# Patient Record
Sex: FEMALE | Race: WHITE | NOT HISPANIC OR LATINO | Employment: OTHER | URBAN - METROPOLITAN AREA
[De-identification: names, ages, dates, MRNs, and addresses within clinical notes are randomized per-mention and may not be internally consistent; named-entity substitution may affect disease eponyms.]

---

## 2017-01-09 RX ORDER — ZOLPIDEM TARTRATE 10 MG/1
TABLET ORAL
Qty: 30 TAB | Refills: 0 | Status: SHIPPED
Start: 2017-01-09 | End: 2017-12-26

## 2017-02-27 ENCOUNTER — HOSPITAL ENCOUNTER (OUTPATIENT)
Facility: MEDICAL CENTER | Age: 62
End: 2017-02-27
Attending: FAMILY MEDICINE
Payer: MEDICARE

## 2017-02-27 ENCOUNTER — OFFICE VISIT (OUTPATIENT)
Dept: INTERNAL MEDICINE | Facility: IMAGING CENTER | Age: 62
End: 2017-02-27
Payer: MEDICARE

## 2017-02-27 DIAGNOSIS — E53.8 B12 DEFICIENCY: ICD-10-CM

## 2017-02-27 DIAGNOSIS — E55.9 VITAMIN D DEFICIENCY: ICD-10-CM

## 2017-02-27 DIAGNOSIS — R60.0 PEDAL EDEMA: ICD-10-CM

## 2017-02-27 DIAGNOSIS — L57.0 AK (ACTINIC KERATOSIS): ICD-10-CM

## 2017-02-27 DIAGNOSIS — K50.819 CROHN'S DISEASE OF SMALL AND LARGE INTESTINES WITH COMPLICATION (HCC): ICD-10-CM

## 2017-02-27 DIAGNOSIS — L82.1 SK (SEBORRHEIC KERATOSIS): ICD-10-CM

## 2017-02-27 DIAGNOSIS — Z91.09 ENVIRONMENTAL ALLERGIES: ICD-10-CM

## 2017-02-27 LAB
25(OH)D3 SERPL-MCNC: 37 NG/ML (ref 30–100)
ALBUMIN SERPL BCP-MCNC: 4.4 G/DL (ref 3.2–4.9)
ALBUMIN/GLOB SERPL: 1.2 G/DL
ALP SERPL-CCNC: 103 U/L (ref 30–99)
ALT SERPL-CCNC: 20 U/L (ref 2–50)
ANION GAP SERPL CALC-SCNC: 8 MMOL/L (ref 0–11.9)
AST SERPL-CCNC: 21 U/L (ref 12–45)
BASOPHILS # BLD AUTO: 0.06 K/UL (ref 0–0.12)
BASOPHILS NFR BLD AUTO: 0.8 % (ref 0–1.8)
BILIRUB SERPL-MCNC: 0.4 MG/DL (ref 0.1–1.5)
BUN SERPL-MCNC: 14 MG/DL (ref 8–22)
CALCIUM SERPL-MCNC: 10.6 MG/DL (ref 8.5–10.5)
CHLORIDE SERPL-SCNC: 102 MMOL/L (ref 96–112)
CO2 SERPL-SCNC: 27 MMOL/L (ref 20–33)
CREAT SERPL-MCNC: 0.92 MG/DL (ref 0.5–1.4)
EOSINOPHIL # BLD: 0.25 K/UL (ref 0–0.51)
EOSINOPHIL NFR BLD AUTO: 3.5 % (ref 0–6.9)
ERYTHROCYTE [DISTWIDTH] IN BLOOD BY AUTOMATED COUNT: 41 FL (ref 35.9–50)
GLOBULIN SER CALC-MCNC: 3.6 G/DL (ref 1.9–3.5)
GLUCOSE SERPL-MCNC: 76 MG/DL (ref 65–99)
HCT VFR BLD AUTO: 42.1 % (ref 37–47)
HGB BLD-MCNC: 13.5 G/DL (ref 12–16)
IMM GRANULOCYTES # BLD AUTO: 0.03 K/UL (ref 0–0.11)
IMM GRANULOCYTES NFR BLD AUTO: 0.4 % (ref 0–0.9)
LYMPHOCYTES # BLD: 2.08 K/UL (ref 1–4.8)
LYMPHOCYTES NFR BLD AUTO: 29.3 % (ref 22–41)
MCH RBC QN AUTO: 29.5 PG (ref 27–33)
MCHC RBC AUTO-ENTMCNC: 32.1 G/DL (ref 33.6–35)
MCV RBC AUTO: 92.1 FL (ref 81.4–97.8)
MONOCYTES # BLD: 0.55 K/UL (ref 0–0.85)
MONOCYTES NFR BLD AUTO: 7.7 % (ref 0–13.4)
NEUTROPHILS # BLD: 4.13 K/UL (ref 2–7.15)
NEUTROPHILS NFR BLD AUTO: 58.3 % (ref 44–72)
NRBC # BLD AUTO: 0 K/UL
NRBC BLD-RTO: 0 /100 WBC
PLATELET # BLD AUTO: 212 K/UL (ref 164–446)
PMV BLD AUTO: 10.8 FL (ref 9–12.9)
POTASSIUM SERPL-SCNC: 3.4 MMOL/L (ref 3.6–5.5)
PROT SERPL-MCNC: 8 G/DL (ref 6–8.2)
RBC # BLD AUTO: 4.57 M/UL (ref 4.2–5.4)
SODIUM SERPL-SCNC: 137 MMOL/L (ref 135–145)
WBC # BLD AUTO: 7.1 K/UL (ref 4.8–10.8)

## 2017-02-27 PROCEDURE — 82306 VITAMIN D 25 HYDROXY: CPT

## 2017-02-27 PROCEDURE — 3014F SCREEN MAMMO DOC REV: CPT | Mod: 8P | Performed by: FAMILY MEDICINE

## 2017-02-27 PROCEDURE — G8482 FLU IMMUNIZE ORDER/ADMIN: HCPCS | Performed by: FAMILY MEDICINE

## 2017-02-27 PROCEDURE — 3017F COLORECTAL CA SCREEN DOC REV: CPT | Performed by: FAMILY MEDICINE

## 2017-02-27 PROCEDURE — G8420 CALC BMI NORM PARAMETERS: HCPCS | Performed by: FAMILY MEDICINE

## 2017-02-27 PROCEDURE — G8432 DEP SCR NOT DOC, RNG: HCPCS | Performed by: FAMILY MEDICINE

## 2017-02-27 PROCEDURE — 80053 COMPREHEN METABOLIC PANEL: CPT

## 2017-02-27 PROCEDURE — 99214 OFFICE O/P EST MOD 30 MIN: CPT | Mod: 25 | Performed by: FAMILY MEDICINE

## 2017-02-27 PROCEDURE — 85025 COMPLETE CBC W/AUTO DIFF WBC: CPT

## 2017-02-27 PROCEDURE — 96372 THER/PROPH/DIAG INJ SC/IM: CPT | Mod: 59 | Performed by: FAMILY MEDICINE

## 2017-02-27 PROCEDURE — 17000 DESTRUCT PREMALG LESION: CPT | Performed by: FAMILY MEDICINE

## 2017-02-27 PROCEDURE — 1036F TOBACCO NON-USER: CPT | Performed by: FAMILY MEDICINE

## 2017-02-27 RX ORDER — IPRATROPIUM BROMIDE 21 UG/1
2 SPRAY, METERED NASAL 2 TIMES DAILY
Qty: 1 BOTTLE | Refills: 2 | Status: SHIPPED | OUTPATIENT
Start: 2017-02-27 | End: 2017-12-29

## 2017-02-27 RX ORDER — POTASSIUM CHLORIDE 750 MG/1
20 CAPSULE, EXTENDED RELEASE ORAL
Qty: 60 CAP | Refills: 2 | Status: SHIPPED | OUTPATIENT
Start: 2017-02-27 | End: 2017-11-02 | Stop reason: SDUPTHER

## 2017-02-27 RX ORDER — CYANOCOBALAMIN 1000 UG/ML
1000 INJECTION, SOLUTION INTRAMUSCULAR; SUBCUTANEOUS
OUTPATIENT
Start: 2017-02-27 | End: 2018-02-22

## 2017-02-27 RX ORDER — FLUTICASONE PROPIONATE 50 MCG
1-2 SPRAY, SUSPENSION (ML) NASAL
Qty: 1 BOTTLE | Refills: 3 | Status: SHIPPED | OUTPATIENT
Start: 2017-02-27 | End: 2018-04-19

## 2017-02-27 RX ADMIN — CYANOCOBALAMIN 1000 MCG: 1000 INJECTION, SOLUTION INTRAMUSCULAR; SUBCUTANEOUS at 15:32

## 2017-02-27 NOTE — Clinical Note
February 27, 2017        RE:  Jana Overton      To Whom It May Concern:      Jana Overton was unable to travel on February 7 through February 21, 2017 due to aggravation of chronic medical conditions.    Please offer her every courtesy in rescheduling.     Sincerely,          Denice Bazzi MD

## 2017-02-27 NOTE — MR AVS SNAPSHOT
Jana JONE Brooklynn   2017 2:30 PM   Office Visit   MRN: [id number]    Department:  [facility]   Dept Phone:  [phone]    Description:  Female : 1955   Provider:  Amanda Bazzi M.D.           Reason for Visit     Follow-Up           Allergies as of 2017     Allergen Noted Reactions    Morphine 2007   Swelling    Methotrexate 2008   Hives, Rash    Roxanol [Morphine Sulfate] 2010       Throat swells    Tape 2012   Rash    Skin peels off; paper tape    Ativan [Lorazepam] 2015       States give me nightmares.     Azathioprine Sodium 10/20/2007   Hives    Celestone [Betamethasone Sodium Phosphate] 2012       Demerol 2007   Vomiting    Elavil [Amitriptyline] 2014       Nightmares      Fentanyl 2008       Patches caused skin breakdown, no pain relief    Kdc:Fentanyl 2008       Lyrica [Pregabalin] 2008       Methadone 2008       Methotrexate Derivatives 2008       Pseudoephedrine 2007   Vomiting    Remicade [Infliximab Injection] 10/20/2007   Hives    Sulfa Drugs 2010   Hives    Sulfasalazine 2013       Tizanidine Hydrochloride 2008         You were diagnosed with     Environmental allergies   [659831]       Vitamin D deficiency   [4129024]       Crohn's disease of small and large intestines with complication (CMS-[facility])   [4587718]       B12 deficiency   [609272]         Vital Signs     Smoking Status                   Never Smoker            Basic Information     Date Of Birth Sex Race Ethnicity Preferred Language    1955 Female White Non- English      Problem List              ICD-10-CM Priority Class Noted - Resolved    Crohn's disease (CMS-[facility]) K50.90   2009 - Present    Chronic pain G89.29   2009 - Present    Status post lumbar surgery Z98.890   2012 - Present    GERD (gastroesophageal reflux disease) K21.9   2012 - Present    History of cardiac  murmur (Chronic) Z86.79   Unknown - Present    Ileostomy in place (CMS-HCC) Z93.2   6/26/2013 - Present    Postherpetic neuralgia B02.29   6/24/2014 - Present    Multiple falls R29.6   6/24/2014 - Present    COPD (chronic obstructive pulmonary disease) (CMS-HCC) J44.9   6/24/2014 - Present    Rosacea L71.9   6/24/2014 - Present    Hypokalemia E87.6   6/24/2014 - Present    Sleep apnea G47.30   10/26/2014 - Present    Hypercalcemia E83.52   3/26/2015 - Present    Paroxysmal atrial fibrillation (CMS-HCC) I48.0   3/26/2015 - Present    History of DVT (deep vein thrombosis) Z86.718   11/23/2016 - Present      Health Maintenance        Date Due Completion Dates    MAMMOGRAM 9/24/2016 9/24/2015, 5/21/2013, 11/1/2010, 1/24/2008, 1/24/2008    PAP SMEAR 11/23/2019 11/23/2016, 10/24/2014    IMM DTaP/Tdap/Td Vaccine (2 - Td) 12/4/2022 12/4/2012    COLONOSCOPY 5/24/2024 5/24/2014 (Prv Comp), 10/4/2011    Override on 5/24/2014: Previously completed            Current Immunizations     Influenza TIV (IM) 9/27/2015, 11/16/2013, 10/6/2012, 9/17/2007    Influenza Vaccine Pediatric 9/29/2009    Influenza Vaccine Quad Inj (Pf) 10/24/2014    Influenza Vaccine Quad Inj (Preserved) 9/21/2016    Pneumococcal Vaccine (UF)Historical Data 10/17/2007    Pneumococcal polysaccharide vaccine (PPSV-23) 10/6/2012    SHINGLES VACCINE 11/1/2014    Tdap Vaccine 12/4/2012      Below and/or attached are the medications your provider expects you to take. Review all of your home medications and newly ordered medications with your provider and/or pharmacist. Follow medication instructions as directed by your provider and/or pharmacist. Please keep your medication list with you and share with your provider. Update the information when medications are discontinued, doses are changed, or new medications (including over-the-counter products) are added; and carry medication information at all times in the event of emergency situations     Allergies:  MORPHINE  - Swelling     METHOTREXATE - Hives,Rash     ROXANOL - (reactions not documented)     TAPE - Rash     ATIVAN - (reactions not documented)     AZATHIOPRINE SODIUM - Hives     CELESTONE - (reactions not documented)     DEMEROL - Vomiting     ELAVIL - (reactions not documented)     FENTANYL - (reactions not documented)     KDC:FENTANYL - (reactions not documented)     LYRICA - (reactions not documented)     METHADONE - (reactions not documented)     METHOTREXATE DERIVATIVES - (reactions not documented)     PSEUDOEPHEDRINE - Vomiting     REMICADE - Hives     SULFA DRUGS - Hives     SULFASALAZINE - (reactions not documented)     TIZANIDINE HYDROCHLORIDE - (reactions not documented)               Medications  Valid as of: February 27, 2017 -  4:04 PM    Generic Name Brand Name Tablet Size Instructions for use    Albuterol Sulfate (Aero Soln) albuterol 108 (90 BASE) MCG/ACT Inhale 2 Puffs by mouth every 6 hours as needed for Shortness of Breath.        Cetirizine HCl (Tab) ZYRTEC 10 MG Take 10 mg by mouth 1 time daily as needed. For allergies        Cyanocobalamin (Solution) VITAMIN B-12 1000 MCG/ML 1 mL by Intramuscular route every 30 days.        DiazePAM (Tab) VALIUM 5 MG Take 1 Tab by mouth every 6 hours as needed (muscle spasm).        Ergocalciferol (Cap) DRISDOL 29533 UNITS TAKE 1 CAPSULE BY MOUTH EVERY 7 DAYS        Estradiol (Cream) ESTRACE 0.1 MG/GM Insert 0.5g vaginally three times each week.        Fluticasone Propionate (Suspension) FLONASE 50 MCG/ACT Spray 1-2 Sprays in nose 1 time daily as needed. Indications: Hayfever        Furosemide (Tab) LASIX 20 MG Take 20 mg by mouth 1 time daily as needed. Indications: Edema        Granisetron HCl (Tab) KYTRIL 1 MG Take 1 Tab by mouth every 12 hours as needed for Nausea/Vomiting.        Ipratropium Bromide (Solution) ATROVENT 0.03 % Spray 2 Sprays in nose 2 times a day.        Lidocaine (Patch) LIDODERM 5 %         Metoprolol Tartrate (Tab) LOPRESSOR 25 MG Take 25 mg  "by mouth 3 times a day as needed. For palpitations        Nystatin (Suspension) MYCOSTATIN 324973 UNIT/ML Take 5 mL by mouth 4 times a day.        Ondansetron (TABLET DISPERSIBLE) ZOFRAN ODT 4 MG Take 2 Tabs by mouth every 8 hours as needed.        OxyCODONE HCl (Conc) oxycodone 20 MG/ML Take 30 mg by mouth every four hours as needed. 20mg/ml solution        Potassium Chloride (Cap CR) MICRO-K 10 MEQ Take 2 Caps by mouth 1 time daily as needed. WITH LASIX ONLY        Probiotic Product (Cap) PROBIOTIC DAILY  Take 1 Cap by mouth every day.        Scopolamine Base (PATCH 72 HR) TRANSDERM-SCOP 1.5 MG/3DAYS Apply 1 Patch to skin as directed every 72 hours.        Spironolactone (Tab) ALDACTONE 25 MG Take 1 Tab by mouth 2 times a day.        Sulfacetamide Sodium-Sulfur (Emulsion) Sulfacetamide Sodium-Sulfur 10-1 % Use as directed once daily.        Syringe/Needle (Disp) (Misc) BD INTEGRA SYRINGE 25G X 1\" 3 ML USE 1 SYRINGE AS DIRECTED EVERY 30 DAYS        Zolpidem Tartrate (Tab) AMBIEN 10 MG TAKE 1 TABLET BY MOUTH AT BEDTIME AS NEEDED FOR SLEEP        .                 Medicines prescribed today were sent to:     [facility] [address] [place] [id number] [place] [address] & [place] [id number] [facility] [address]    Phone: [phone] Fax: [phone]    Open 24 Hours?: No      Medication refill instructions:       If your prescription bottle indicates you have medication refills left, it is not necessary to call your provider’s office. Please contact your pharmacy and they will refill your medication.    If your prescription bottle indicates you do not have any refills left, you may request refills at any time through one of the following ways: The online [employer] system (except Urgent Care), by calling your provider’s office, or by asking your pharmacy to contact your provider’s office with a refill request. Medication refills are processed only during regular business hours and " may not be available until the next business day. Your provider may request additional information or to have a follow-up visit with you prior to refilling your medication.   *Please Note: Medication refills are assigned a new Rx number when refilled electronically. Your pharmacy may indicate that no refills were authorized even though a new prescription for the same medication is available at the pharmacy. Please request the medicine by name with the pharmacy before contacting your provider for a refill.        Your To Do List     Future Labs/Procedures Complete By Expires    CBC WITH DIFFERENTIAL  As directed 2/28/2018    COMP METABOLIC PANEL  As directed 2/28/2018    VITAMIN D,25 HYDROXY  As directed 2/28/2018      Other Notes About Your Plan     Pain Mangement:  Dr. Telles  Vascular:  Dr. Cevallos.  GI: Dr. Mahan  Card: [facility]  Pulm:  [facility]             MyChart Status: Patient Declined

## 2017-02-28 NOTE — PROGRESS NOTES
Chief Complaint   Patient presents with   • Follow-Up     crohns, skin spots, labwork       HISTORY OF PRESENT ILLNESS: Patient is a 61 y.o. female established patient who presents today to follow-up on several issues.    She has Crohn's disease. She is status post several abdominal surgeries. She has a colostomy. She does need a note for the airlines. She and her  plan to travel from February 7 to the 21st that she was experiencing a flare of her Crohn's disease she was having increased abdominal pain, diarrhea. Nausea and vomiting. She needs a note to reschedule.    Also experienced intermittent pedal edema. That has been better. She's been trying to be a little bit more active. She does use Lasix occasionally, always takes potassium with it. Is here also to have blood work drawn. I recommend she recheck her CBC and chem panel.    She has a couple of lesions she would like checked. She has a red scaly lesion on the left side of her nose but never heals completely. Been there for at least 6 months. She has several lesions on her back that are elevated rough and itchy.      Patient Active Problem List    Diagnosis Date Noted   • History of DVT (deep vein thrombosis) 11/23/2016   • Hypercalcemia 03/26/2015   • Paroxysmal atrial fibrillation (CMS-HCC) 03/26/2015   • Sleep apnea 10/26/2014   • Postherpetic neuralgia 06/24/2014   • Multiple falls 06/24/2014   • COPD (chronic obstructive pulmonary disease) (CMS-HCC) 06/24/2014   • Rosacea 06/24/2014   • Hypokalemia 06/24/2014   • Ileostomy in place (CMS-HCC) 06/26/2013   • History of cardiac murmur    • GERD (gastroesophageal reflux disease) 12/05/2012   • Status post lumbar surgery 07/16/2012   • Crohn's disease (CMS-HCC) 09/23/2009   • Chronic pain 09/23/2009     Current Outpatient Prescriptions on File Prior to Visit   Medication Sig Dispense Refill   • zolpidem (AMBIEN) 10 MG Tab TAKE 1 TABLET BY MOUTH AT BEDTIME AS NEEDED FOR SLEEP 30 Tab 0   • Sulfacetamide  "Sodium-Sulfur 10-1 % Emulsion Use as directed once daily. 1 Tube 1   • scopolamine (TRANSDERM-SCOP) 1.5 MG/3DAYS PATCH 72 HR Apply 1 Patch to skin as directed every 72 hours. 4 Patch 1   • spironolactone (ALDACTONE) 25 MG Tab Take 1 Tab by mouth 2 times a day. 60 Tab 3   • estradiol (ESTRACE) 0.1 MG/GM vaginal cream Insert 0.5g vaginally three times each week. 1 Tube 6   • albuterol 108 (90 BASE) MCG/ACT Aero Soln inhalation aerosol Inhale 2 Puffs by mouth every 6 hours as needed for Shortness of Breath. 1 Inhaler 1   • granisetron (KYTRIL) 1 MG Tab Take 1 Tab by mouth every 12 hours as needed for Nausea/Vomiting. 30 Tab 1   • ondansetron (ZOFRAN ODT) 4 MG TABLET DISPERSIBLE Take 2 Tabs by mouth every 8 hours as needed. 30 Tab 3   • nystatin (MYCOSTATIN) 817300 UNIT/ML Suspension Take 5 mL by mouth 4 times a day. 120 mL 1   • vitamin D, Ergocalciferol, (DRISDOL) 27082 UNITS Cap capsule TAKE 1 CAPSULE BY MOUTH EVERY 7 DAYS 4 Cap 11   • BD INTEGRA SYRINGE 25G X 1\" 3 ML Misc USE 1 SYRINGE AS DIRECTED EVERY 30 DAYS 11 Each 0   • lidocaine (LIDODERM) 5 % PTCH   1   • furosemide (LASIX) 20 MG TABS Take 20 mg by mouth 1 time daily as needed. Indications: Edema     • metoprolol (LOPRESSOR) 25 MG TABS Take 25 mg by mouth 3 times a day as needed. For palpitations     • Probiotic Product (PROBIOTIC DAILY) CAPS Take 1 Cap by mouth every day.     • cyanocobalamin (VITAMIN B-12) 1000 MCG/ML SOLN 1 mL by Intramuscular route every 30 days. 1 mL 0   • diazepam (VALIUM) 5 MG TABS Take 1 Tab by mouth every 6 hours as needed (muscle spasm). 120 Tab 2   • cetirizine (ZYRTEC) 10 MG TABS Take 10 mg by mouth 1 time daily as needed. For allergies     • oxycodone 20 MG/ML CONC Take 30 mg by mouth every four hours as needed. 20mg/ml solution       No current facility-administered medications on file prior to visit.         Past medical, surgical, family, and social history is reviewed and updated in Epic chart by me today.   Medications and " allergies reviewed and updated in Epic chart by me today.     REVIEW OF SYSTEMS:  GENERAL: No fatigue, no weight loss.  HEENT:  Ears--no earache, no change in hearing, no dizziness, no tinnitus.                 Eyes--no blurred vision, no discharge or pain                 Throat--No sore throat, no dysphagia, no hoarseness  CV:  No chest pain,dyspnea,palpitations or edema.  RESP:  No sob,cough,wheezing or hemoptysis.  GI: No dysphagia, heartburn,abdominal pain, nausea, vomiting, diarrhea or constipation.       No melena, jaundice, bleeding, incontinence or change in bowel habits.  :  No dysuria, polyuria, hematuria, incontinence, or nocturia.  MS:  No joint swelling, myalgias, or arthralgias.  NEURO:  No seizures, syncope, paralysis, tremor, or weakness.  SKIN: No new or concerning skin lesions or changes.   PSYCH: Mood fine.    Vitals:  Temperature 98.6  Respirations 14  Oxygen 96%  Blood pressure 132/80   pulse 100      Physical Exam:  Gen: Well developed, well nourished. No acute distress.  Neck:  Supple, no adenopathy or thyromegaly.  Heart:  Regular rate and rhythm.  Normal S1, S2. No murmur, gallop or rub.  Lungs:  Clear, No wheezes,rales or rhonchi.  Extremities:  No edema.  Skin: She has a 4 mm erythematous scaling dry lesion on the left side of her nose consistent with an actinic keratosis  She has 3 lesions on her back, ranging from 2 mm to 4 mm in size, all are light tan, rough, elevated and consistent with seborrheic keratoses.  Psych: Mood and affect are appropriate.    Assessment/Plan:  1. Environmental allergies. She does she is Flonase and Atrovent on a daily basis. It does keep her allergy symptoms control.  ipratropium (ATROVENT) 0.03 % Solution   2. Vitamin D deficiency. She'll continue on vitamin D replacement monitor lab work.  VITAMIN D,25 HYDROXY   3. Crohn's disease of small and large intestines with complication (CMS-HCC) . Her symptoms have improved. She is given a note for the airlines  to allow her to reschedule her trip. She'll also follow-up every 6 months with GI.  COMP METABOLIC PANEL    CBC WITH DIFFERENTIAL   4. B12 deficiency . B-12 injection today.  cyanocobalamin (VITAMIN B-12) injection 1,000 mcg   5. Pedal edema . Encouraged her to continue with her activity. Elevate her legs when resting. She may use Lasix as needed. She will always take potassium with Lasix.     6. AK (actinic keratosis). The lesion on her nose is treated today with liquid nitrogen. She is instructed in wound care.     7. SK (seborrheic keratosis) . The 3 lesions on her back are also treated today with liquid nitrogen. She was instructed in wound care and will call for concerns.        Follow-up in 3 months and as needed

## 2017-03-01 DIAGNOSIS — J40 BRONCHITIS: ICD-10-CM

## 2017-03-01 RX ORDER — PROMETHAZINE HYDROCHLORIDE AND CODEINE PHOSPHATE 6.25; 1 MG/5ML; MG/5ML
5 SYRUP ORAL 4 TIMES DAILY PRN
Qty: 120 ML | Refills: 0 | Status: SHIPPED
Start: 2017-03-01 | End: 2017-12-26

## 2017-03-02 ENCOUNTER — TELEPHONE (OUTPATIENT)
Dept: INTERNAL MEDICINE | Facility: IMAGING CENTER | Age: 62
End: 2017-03-02

## 2017-03-02 RX ORDER — CEFDINIR 300 MG/1
300 CAPSULE ORAL 2 TIMES DAILY
Qty: 20 CAP | Refills: 0 | Status: SHIPPED | OUTPATIENT
Start: 2017-03-02 | End: 2017-11-02

## 2017-03-02 RX ORDER — CODEINE PHOSPHATE/GUAIFENESIN 10-100MG/5
5 LIQUID (ML) ORAL 3 TIMES DAILY PRN
Qty: 120 ML | Refills: 0 | Status: SHIPPED
Start: 2017-03-02 | End: 2017-12-26

## 2017-03-02 NOTE — TELEPHONE ENCOUNTER
----- Message from Dianne Sandoval R.N. sent at 3/2/2017 10:29 AM PST -----  Jana would like to speak with you today.  She rambled into to one topic and another so I would clarify this.  She needs a letter for the airline so that they put her in a seat behind the bulkhead so that she gets circulation on her stoma since her legs are so long.  She also needs a letter so that she can have a wheelchair in the airport and stating that she also carries her medical supplies,medications,etc.  She also wants to chat with you about her ears.  [place] LEMONS

## 2017-03-02 NOTE — TELEPHONE ENCOUNTER
Spoke with Jana. Recommend she try a Kenalog injection to help with allergies and eustachian tube dysfunction prior to her vacation.    I also will arrange a letter to address the mentioned issues

## 2017-03-02 NOTE — Clinical Note
March 2, 2017        RE:  Jana Overton    To Whom It May Concern,    Jana Overton suffers from several chronic medical conditions and has a colostomy. Please offer her the courtesy of sitting in a seat behind the bulkhead to allow more room and comfort.     She will be traveling with ostomy supplies and pain medications.    She will need a wheelchair to transport inside the airport.    Thank you for all accommodations that you can offer.    Sincerely,        Amanda Bazzi MD

## 2017-03-03 ENCOUNTER — NON-PROVIDER VISIT (OUTPATIENT)
Dept: INTERNAL MEDICINE | Facility: IMAGING CENTER | Age: 62
End: 2017-03-03
Payer: MEDICARE

## 2017-03-03 DIAGNOSIS — G89.4 CHRONIC PAIN SYNDROME: ICD-10-CM

## 2017-03-03 DIAGNOSIS — B02.29 POSTHERPETIC NEURALGIA: ICD-10-CM

## 2017-03-03 PROCEDURE — 96372 THER/PROPH/DIAG INJ SC/IM: CPT | Performed by: FAMILY MEDICINE

## 2017-03-03 RX ORDER — TRIAMCINOLONE ACETONIDE 40 MG/ML
60 INJECTION, SUSPENSION INTRA-ARTICULAR; INTRAMUSCULAR ONCE
Status: COMPLETED | OUTPATIENT
Start: 2017-03-03 | End: 2017-03-03

## 2017-03-03 RX ADMIN — TRIAMCINOLONE ACETONIDE 60 MG: 40 INJECTION, SUSPENSION INTRA-ARTICULAR; INTRAMUSCULAR at 16:45

## 2017-04-04 ENCOUNTER — OFFICE VISIT (OUTPATIENT)
Dept: INTERNAL MEDICINE | Facility: IMAGING CENTER | Age: 62
End: 2017-04-04
Payer: MEDICARE

## 2017-04-04 VITALS
HEIGHT: 72 IN | OXYGEN SATURATION: 97 % | WEIGHT: 160 LBS | RESPIRATION RATE: 14 BRPM | HEART RATE: 87 BPM | SYSTOLIC BLOOD PRESSURE: 138 MMHG | BODY MASS INDEX: 21.67 KG/M2 | DIASTOLIC BLOOD PRESSURE: 74 MMHG | TEMPERATURE: 97.6 F

## 2017-04-04 DIAGNOSIS — E55.9 VITAMIN D DEFICIENCY: ICD-10-CM

## 2017-04-04 DIAGNOSIS — L57.0 AK (ACTINIC KERATOSIS): ICD-10-CM

## 2017-04-04 DIAGNOSIS — S80.811A ABRASION, LOWER LEG, ANTERIOR, RIGHT, INITIAL ENCOUNTER: ICD-10-CM

## 2017-04-04 DIAGNOSIS — L91.8 SKIN TAG: ICD-10-CM

## 2017-04-04 DIAGNOSIS — S60.519A ABRASION HAND: ICD-10-CM

## 2017-04-04 PROCEDURE — 1036F TOBACCO NON-USER: CPT | Performed by: FAMILY MEDICINE

## 2017-04-04 PROCEDURE — 11200 RMVL SKIN TAGS UP TO&INC 15: CPT | Mod: 59 | Performed by: FAMILY MEDICINE

## 2017-04-04 PROCEDURE — G8420 CALC BMI NORM PARAMETERS: HCPCS | Performed by: FAMILY MEDICINE

## 2017-04-04 PROCEDURE — 17000 DESTRUCT PREMALG LESION: CPT | Performed by: FAMILY MEDICINE

## 2017-04-04 PROCEDURE — 17003 DESTRUCT PREMALG LES 2-14: CPT | Performed by: FAMILY MEDICINE

## 2017-04-04 PROCEDURE — 3014F SCREEN MAMMO DOC REV: CPT | Mod: 8P | Performed by: FAMILY MEDICINE

## 2017-04-04 PROCEDURE — G8432 DEP SCR NOT DOC, RNG: HCPCS | Performed by: FAMILY MEDICINE

## 2017-04-04 PROCEDURE — 99213 OFFICE O/P EST LOW 20 MIN: CPT | Mod: 25 | Performed by: FAMILY MEDICINE

## 2017-04-04 RX ORDER — TORSEMIDE 10 MG/1
10 TABLET ORAL DAILY
Qty: 30 TAB | Refills: 3 | Status: SHIPPED | OUTPATIENT
Start: 2017-04-04 | End: 2017-12-26

## 2017-04-04 RX ORDER — ERGOCALCIFEROL 1.25 MG/1
50000 CAPSULE ORAL
Qty: 4 CAP | Refills: 11 | Status: SHIPPED | OUTPATIENT
Start: 2017-04-04 | End: 2017-12-26

## 2017-04-04 RX ADMIN — CYANOCOBALAMIN 1000 MCG: 1000 INJECTION, SOLUTION INTRAMUSCULAR; SUBCUTANEOUS at 14:27

## 2017-04-04 NOTE — MR AVS SNAPSHOT
"Jana Overton   2017 1:30 PM   Office Visit   MRN: [id number]    Department:  [facility]   Dept Phone:  [phone]    Description:  Female : 1955   Provider:  Amanda Bazzi M.D.           Allergies as of 2017     Allergen Noted Reactions    Morphine 2007   Swelling    Methotrexate 2008   Hives, Rash    Roxanol [Morphine Sulfate] 2010       Throat swells    Tape 2012   Rash    Skin peels off; paper tape    Ativan [Lorazepam] 2015       States give me nightmares.     Azathioprine Sodium 10/20/2007   Hives    Celestone [Betamethasone Sodium Phosphate] 2012       Demerol 2007   Vomiting    Elavil [Amitriptyline] 2014       Nightmares      Fentanyl 2008       Patches caused skin breakdown, no pain relief    Kdc:Fentanyl 2008       Lyrica [Pregabalin] 2008       Methadone 2008       Methotrexate Derivatives 2008       Pseudoephedrine 2007   Vomiting    Remicade [Infliximab Injection] 10/20/2007   Hives    Sulfa Drugs 2010   Hives    Sulfasalazine 2013       Tizanidine Hydrochloride 2008         Vital Signs     Blood Pressure Pulse Temperature Respirations Height Weight    138/74 mmHg 87 36.4 °C (97.6 °F) 14 1.83 m (6' 0.05\") 72.576 kg (160 lb)    Body Mass Index Oxygen Saturation Smoking Status             21.67 kg/m2 97% Never Smoker          Basic Information     Date Of Birth Sex Race Ethnicity Preferred Language    1955 Female White Non- English      Problem List              ICD-10-CM Priority Class Noted - Resolved    Crohn's disease (CMS-[facility]) K50.90   2009 - Present    Chronic pain G89.29   2009 - Present    Status post lumbar surgery Z98.890   2012 - Present    GERD (gastroesophageal reflux disease) K21.9   2012 - Present    History of cardiac murmur (Chronic) Z86.79   Unknown - Present    Ileostomy in place (CMS-[facility]) Z93.2   2013 - " Present    Postherpetic neuralgia B02.29   6/24/2014 - Present    Multiple falls R29.6   6/24/2014 - Present    COPD (chronic obstructive pulmonary disease) (CMS-HCC) J44.9   6/24/2014 - Present    Rosacea L71.9   6/24/2014 - Present    Hypokalemia E87.6   6/24/2014 - Present    Sleep apnea G47.30   10/26/2014 - Present    Hypercalcemia E83.52   3/26/2015 - Present    Paroxysmal atrial fibrillation (CMS-HCC) I48.0   3/26/2015 - Present    History of DVT (deep vein thrombosis) Z86.718   11/23/2016 - Present      Health Maintenance        Date Due Completion Dates    MAMMOGRAM 9/24/2016 9/24/2015, 5/21/2013, 11/1/2010, 1/24/2008, 1/24/2008    PAP SMEAR 11/23/2019 11/23/2016, 10/24/2014    IMM DTaP/Tdap/Td Vaccine (2 - Td) 12/4/2022 12/4/2012    COLONOSCOPY 5/24/2024 5/24/2014 (Prv Comp), 10/4/2011    Override on 5/24/2014: Previously completed            Current Immunizations     Influenza TIV (IM) 9/27/2015, 11/16/2013, 10/6/2012, 9/17/2007    Influenza Vaccine Pediatric 9/29/2009    Influenza Vaccine Quad Inj (Pf) 10/24/2014    Influenza Vaccine Quad Inj (Preserved) 9/21/2016    Pneumococcal Vaccine (UF)Historical Data 10/17/2007    Pneumococcal polysaccharide vaccine (PPSV-23) 10/6/2012    SHINGLES VACCINE 11/1/2014    Tdap Vaccine 12/4/2012      Below and/or attached are the medications your provider expects you to take. Review all of your home medications and newly ordered medications with your provider and/or pharmacist. Follow medication instructions as directed by your provider and/or pharmacist. Please keep your medication list with you and share with your provider. Update the information when medications are discontinued, doses are changed, or new medications (including over-the-counter products) are added; and carry medication information at all times in the event of emergency situations     Allergies:  MORPHINE - Swelling     METHOTREXATE - Hives,Rash     ROXANOL - (reactions not documented)     TAPE -  Rash     ATIVAN - (reactions not documented)     AZATHIOPRINE SODIUM - Hives     CELESTONE - (reactions not documented)     DEMEROL - Vomiting     ELAVIL - (reactions not documented)     FENTANYL - (reactions not documented)     KDC:FENTANYL - (reactions not documented)     LYRICA - (reactions not documented)     METHADONE - (reactions not documented)     METHOTREXATE DERIVATIVES - (reactions not documented)     PSEUDOEPHEDRINE - Vomiting     REMICADE - Hives     SULFA DRUGS - Hives     SULFASALAZINE - (reactions not documented)     TIZANIDINE HYDROCHLORIDE - (reactions not documented)               Medications  Valid as of: April 04, 2017 -  4:43 PM    Generic Name Brand Name Tablet Size Instructions for use    Albuterol Sulfate (Aero Soln) albuterol 108 (90 BASE) MCG/ACT Inhale 2 Puffs by mouth every 6 hours as needed for Shortness of Breath.        Cefdinir (Cap) OMNICEF 300 MG Take 1 Cap by mouth 2 times a day.        Cetirizine HCl (Tab) ZYRTEC 10 MG Take 10 mg by mouth 1 time daily as needed. For allergies        Cyanocobalamin (Solution) VITAMIN B-12 1000 MCG/ML 1 mL by Intramuscular route every 30 days.        DiazePAM (Tab) VALIUM 5 MG Take 1 Tab by mouth every 6 hours as needed (muscle spasm).        Ergocalciferol (Cap) DRISDOL 02431 UNITS Take 1 Cap by mouth every 7 days.        Estradiol (Cream) ESTRACE 0.1 MG/GM Insert 0.5g vaginally three times each week.        Fluticasone Propionate (Suspension) FLONASE 50 MCG/ACT Spray 1-2 Sprays in nose 1 time daily as needed. Indications: Hayfever        Furosemide (Tab) LASIX 20 MG Take 20 mg by mouth 1 time daily as needed. Indications: Edema        Granisetron HCl (Tab) KYTRIL 1 MG Take 1 Tab by mouth every 12 hours as needed for Nausea/Vomiting.        Guaifenesin-Codeine (Syrup) TUSSI-ORGANIDIN -10 MG/5ML Take 5 mL by mouth 3 times a day as needed for Cough.        Ipratropium Bromide (Solution) ATROVENT 0.03 % Spray 2 Sprays in nose 2 times a day.      "   Lidocaine (Patch) LIDODERM 5 %         Metoprolol Tartrate (Tab) LOPRESSOR 25 MG Take 1 Tab by mouth 3 times a day as needed. For palpitations        Nystatin (Suspension) MYCOSTATIN 881219 UNIT/ML Take 5 mL by mouth 4 times a day.        Ondansetron (TABLET DISPERSIBLE) ZOFRAN ODT 4 MG Take 2 Tabs by mouth every 8 hours as needed.        OxyCODONE HCl (Conc) oxycodone 20 MG/ML Take 30 mg by mouth every four hours as needed. 20mg/ml solution        Potassium Chloride (Cap CR) MICRO-K 10 MEQ Take 2 Caps by mouth 1 time daily as needed. WITH LASIX ONLY        Probiotic Product (Cap) PROBIOTIC DAILY  Take 1 Cap by mouth every day.        Promethazine-Codeine (Syrup) PHENERGAN-CODEINE 6.25-10 MG/5ML Take 5 mL by mouth 4 times a day as needed for Cough.        Scopolamine Base (PATCH 72 HR) TRANSDERM-SCOP 1.5 MG/3DAYS Apply 1 Patch to skin as directed every 72 hours.        Spironolactone (Tab) ALDACTONE 25 MG Take 1 Tab by mouth 2 times a day.        Sulfacetamide Sodium-Sulfur (Emulsion) Sulfacetamide Sodium-Sulfur 10-1 % Use as directed once daily.        Syringe/Needle (Disp) (Misc) BD INTEGRA SYRINGE 25G X 1\" 3 ML USE 1 SYRINGE AS DIRECTED EVERY 30 DAYS        Torsemide (Tab) DEMADEX 10 MG Take 1 Tab by mouth every day. Prn swelling        Zolpidem Tartrate (Tab) AMBIEN 10 MG TAKE 1 TABLET BY MOUTH AT BEDTIME AS NEEDED FOR SLEEP        .                 Medicines prescribed today were sent to:     [facility] [address] [place] [address] [facility]    [address] [facility] [place] [address]    Phone: [phone] Fax: [phone]    Open 24 Hours?: No      Medication refill instructions:       If your prescription bottle indicates you have medication refills left, it is not necessary to call your provider’s office. Please contact your pharmacy and they will refill your medication.    If your prescription bottle indicates you do not have any refills left, you may " request refills at any time through one of the following ways: The online [employer] system (except Urgent Care), by calling your provider’s office, or by asking your pharmacy to contact your provider’s office with a refill request. Medication refills are processed only during regular business hours and may not be available until the next business day. Your provider may request additional information or to have a follow-up visit with you prior to refilling your medication.   *Please Note: Medication refills are assigned a new Rx number when refilled electronically. Your pharmacy may indicate that no refills were authorized even though a new prescription for the same medication is available at the pharmacy. Please request the medicine by name with the pharmacy before contacting your provider for a refill.        Other Notes About Your Plan     Pain Mangement:  Dr. Telles  Vascular:  Dr. Cevallos.  GI: Dr. Mahan  Card: [facility]  Pulm:  [facility]             [employer] Status: Patient Declined

## 2017-04-05 NOTE — PROGRESS NOTES
Chief Complaint   Patient presents with   • Skin Lesion     several       HISTORY OF PRESENT ILLNESS: Patient is a 61 y.o. female established patient who presents today to check a couple skin lesions. She scraped the top of her right hand on a zipper as she was reaching into a bank. She also scraped her right lower leg on something. Both of these happened about 2 weeks ago. They are slowly healing. She would just like them checked. She's not been running any fever. Neither of them have been draining. She has been keeping them covered.    She also has a couple of skin lesions. She is a small skin tag on her right upper eyelid that is irritating. She also has several scaling lesions on both hands that do not heal completely. History of sun exposure.      Patient Active Problem List    Diagnosis Date Noted   • History of DVT (deep vein thrombosis) 11/23/2016   • Hypercalcemia 03/26/2015   • Paroxysmal atrial fibrillation (CMS-HCC) 03/26/2015   • Sleep apnea 10/26/2014   • Postherpetic neuralgia 06/24/2014   • Multiple falls 06/24/2014   • COPD (chronic obstructive pulmonary disease) (CMS-HCC) 06/24/2014   • Rosacea 06/24/2014   • Hypokalemia 06/24/2014   • Ileostomy in place (CMS-HCC) 06/26/2013   • History of cardiac murmur    • GERD (gastroesophageal reflux disease) 12/05/2012   • Status post lumbar surgery 07/16/2012   • Crohn's disease (CMS-HCC) 09/23/2009   • Chronic pain 09/23/2009     Current Outpatient Prescriptions on File Prior to Visit   Medication Sig Dispense Refill   • guaifenesin-codeine (TUSSI-ORGANIDIN NR) 100-10 MG/5ML syrup Take 5 mL by mouth 3 times a day as needed for Cough. 120 mL 0   • cefdinir (OMNICEF) 300 MG Cap Take 1 Cap by mouth 2 times a day. 20 Cap 0   • promethazine-codeine (PHENERGAN-CODEINE) 6.25-10 MG/5ML Syrup Take 5 mL by mouth 4 times a day as needed for Cough. 120 mL 0   • potassium chloride (MICRO-K) 10 MEQ capsule Take 2 Caps by mouth 1 time daily as needed. WITH LASIX ONLY 60  "Cap 2   • fluticasone (FLONASE) 50 MCG/ACT nasal spray Spray 1-2 Sprays in nose 1 time daily as needed. Indications: Hayfever 1 Bottle 3   • ipratropium (ATROVENT) 0.03 % Solution Spray 2 Sprays in nose 2 times a day. 1 Bottle 2   • zolpidem (AMBIEN) 10 MG Tab TAKE 1 TABLET BY MOUTH AT BEDTIME AS NEEDED FOR SLEEP 30 Tab 0   • Sulfacetamide Sodium-Sulfur 10-1 % Emulsion Use as directed once daily. 1 Tube 1   • scopolamine (TRANSDERM-SCOP) 1.5 MG/3DAYS PATCH 72 HR Apply 1 Patch to skin as directed every 72 hours. 4 Patch 1   • spironolactone (ALDACTONE) 25 MG Tab Take 1 Tab by mouth 2 times a day. 60 Tab 3   • estradiol (ESTRACE) 0.1 MG/GM vaginal cream Insert 0.5g vaginally three times each week. 1 Tube 6   • albuterol 108 (90 BASE) MCG/ACT Aero Soln inhalation aerosol Inhale 2 Puffs by mouth every 6 hours as needed for Shortness of Breath. 1 Inhaler 1   • granisetron (KYTRIL) 1 MG Tab Take 1 Tab by mouth every 12 hours as needed for Nausea/Vomiting. 30 Tab 1   • ondansetron (ZOFRAN ODT) 4 MG TABLET DISPERSIBLE Take 2 Tabs by mouth every 8 hours as needed. 30 Tab 3   • nystatin (MYCOSTATIN) 006995 UNIT/ML Suspension Take 5 mL by mouth 4 times a day. 120 mL 1   • BD INTEGRA SYRINGE 25G X 1\" 3 ML Misc USE 1 SYRINGE AS DIRECTED EVERY 30 DAYS 11 Each 0   • lidocaine (LIDODERM) 5 % PTCH   1   • furosemide (LASIX) 20 MG TABS Take 20 mg by mouth 1 time daily as needed. Indications: Edema     • Probiotic Product (PROBIOTIC DAILY) CAPS Take 1 Cap by mouth every day.     • cyanocobalamin (VITAMIN B-12) 1000 MCG/ML SOLN 1 mL by Intramuscular route every 30 days. 1 mL 0   • diazepam (VALIUM) 5 MG TABS Take 1 Tab by mouth every 6 hours as needed (muscle spasm). 120 Tab 2   • cetirizine (ZYRTEC) 10 MG TABS Take 10 mg by mouth 1 time daily as needed. For allergies     • oxycodone 20 MG/ML CONC Take 30 mg by mouth every four hours as needed. 20mg/ml solution       Current Facility-Administered Medications on File Prior to Visit " "  Medication Dose Route Frequency Provider Last Rate Last Dose   • cyanocobalamin (VITAMIN B-12) injection 1,000 mcg  1,000 mcg Intramuscular Q30 DAYS Amanda Bazzi M.D.   1,000 mcg at 04/04/17 1427       Past medical, surgical, family, and social history is reviewed and updated in Epic chart by me today.   Medications and allergies reviewed and updated in Epic chart by me today.     REVIEW OF SYSTEMS:  GENERAL: Chronic fatigue, no weight loss.  HEENT:  Ears--no earache, no change in hearing, no dizziness, no tinnitus.                 Eyes--no blurred vision, no discharge or pain                 Throat--No sore throat, no dysphagia, no hoarseness  CV:  No chest pain,dyspnea,palpitations or edema.  RESP:  No sob,cough,wheezing or hemoptysis.  GI: She has a colostomy. No change in stools. No nausea, vomiting. Chronic abdominal pain.  :  No dysuria, polyuria, hematuria, incontinence, or nocturia.  MS:  No joint swelling, myalgias, or arthralgias.  NEURO:  No seizures, syncope, paralysis, tremor, or weakness.  SKIN: As above.  PSYCH: Mood fine.    Filed Vitals:    04/04/17 1300   BP: 138/74   Pulse: 87   Temp: 36.4 °C (97.6 °F)   Resp: 14   Height: 1.83 m (6' 0.05\")   Weight: 72.576 kg (160 lb)   SpO2: 97%     Physical Exam:  Gen: Well developed, well nourished. No acute distress.  Neck:  Supple, no adenopathy or thyromegaly.  Heart:  Regular rate and rhythm.  Normal S1, S2. No murmur, gallop or rub.  Lungs:  Clear, No wheezes,rales or rhonchi.  Extremities:  No edema.  Psych: Mood and affect are appropriate.  Skin: She has a 1 cm erythematous healing lesion on  the dorsum of her right hand. There is no drainage. No sign of infection. She has a 1 cm lesion on her right lower leg also in the process of healing. No drainage. Mildly erythematous. No warmth    She has a 2 mm skin tag on her right upper eyelid at the eyelash line.  She has 2 lesions on the dorsum of each hand. All are between 2 and 5 mm in size, " erythematous, scaling, consistent with actinic keratoses.    Assessment/Plan:  1. Abrasion hand . Healing. No signs of infection. Reassurance and observation.     2. Abrasion, lower leg, anterior, right, initial encounter . Healing. No signs of infection. Reassurance and observation.     3. Skin tag . Treated with liquid nitrogen today.     4. AK (actinic keratosis) 4 lesions were treated today with liquid nitrogen. She is instructed in wound care and will call for any concerns.

## 2017-04-10 DIAGNOSIS — R60.9 EDEMA, UNSPECIFIED TYPE: ICD-10-CM

## 2017-04-10 RX ORDER — SPIRONOLACTONE 25 MG/1
25 TABLET ORAL 2 TIMES DAILY
Qty: 60 TAB | Refills: 3 | Status: SHIPPED | OUTPATIENT
Start: 2017-04-10 | End: 2017-12-09 | Stop reason: SDUPTHER

## 2017-04-12 ENCOUNTER — TELEPHONE (OUTPATIENT)
Dept: INTERNAL MEDICINE | Facility: IMAGING CENTER | Age: 62
End: 2017-04-12

## 2017-04-12 DIAGNOSIS — M51.36 DDD (DEGENERATIVE DISC DISEASE), LUMBAR: ICD-10-CM

## 2017-04-12 DIAGNOSIS — M54.2 NECK PAIN: ICD-10-CM

## 2017-04-12 PROBLEM — M51.369 DDD (DEGENERATIVE DISC DISEASE), LUMBAR: Status: ACTIVE | Noted: 2017-04-12

## 2017-04-12 NOTE — TELEPHONE ENCOUNTER
----- Message from Dianne Sandoval R.N. sent at 4/12/2017  2:12 PM PDT -----  Jana would like a referral to [facility] on [facility] and [facility] to see Pérez LEMONS

## 2017-04-25 PROBLEM — D23.71 OTHER BENIGN NEOPLASM OF SKIN OF RIGHT LOWER LIMB, INCLUDING HIP: Status: ACTIVE | Noted: 2017-04-25

## 2017-04-26 DIAGNOSIS — F41.9 ANXIETY: ICD-10-CM

## 2017-04-26 RX ORDER — DIAZEPAM 5 MG/1
2.5-5 TABLET ORAL EVERY 6 HOURS PRN
Qty: 120 TAB | Refills: 0 | Status: SHIPPED
Start: 2017-04-26 | End: 2017-10-11 | Stop reason: SDUPTHER

## 2017-04-28 DIAGNOSIS — J98.01 BRONCHOSPASM: ICD-10-CM

## 2017-04-28 RX ORDER — ALBUTEROL SULFATE 90 UG/1
2 AEROSOL, METERED RESPIRATORY (INHALATION) EVERY 6 HOURS PRN
Qty: 1 INHALER | Refills: 1 | Status: SHIPPED | OUTPATIENT
Start: 2017-04-28 | End: 2017-05-03 | Stop reason: SDUPTHER

## 2017-05-03 ENCOUNTER — OFFICE VISIT (OUTPATIENT)
Dept: INTERNAL MEDICINE | Facility: IMAGING CENTER | Age: 62
End: 2017-05-03
Payer: MEDICARE

## 2017-05-03 VITALS
SYSTOLIC BLOOD PRESSURE: 126 MMHG | DIASTOLIC BLOOD PRESSURE: 70 MMHG | OXYGEN SATURATION: 93 % | RESPIRATION RATE: 12 BRPM | HEIGHT: 72 IN | TEMPERATURE: 97.7 F | HEART RATE: 83 BPM | WEIGHT: 160 LBS | BODY MASS INDEX: 21.67 KG/M2

## 2017-05-03 DIAGNOSIS — H69.93 ETD (EUSTACHIAN TUBE DYSFUNCTION), BILATERAL: ICD-10-CM

## 2017-05-03 DIAGNOSIS — R09.82 PND (POST-NASAL DRIP): ICD-10-CM

## 2017-05-03 DIAGNOSIS — J98.01 BRONCHOSPASM: ICD-10-CM

## 2017-05-03 PROCEDURE — 3014F SCREEN MAMMO DOC REV: CPT | Mod: 8P | Performed by: FAMILY MEDICINE

## 2017-05-03 PROCEDURE — 1036F TOBACCO NON-USER: CPT | Performed by: FAMILY MEDICINE

## 2017-05-03 PROCEDURE — G8420 CALC BMI NORM PARAMETERS: HCPCS | Performed by: FAMILY MEDICINE

## 2017-05-03 PROCEDURE — G8432 DEP SCR NOT DOC, RNG: HCPCS | Performed by: FAMILY MEDICINE

## 2017-05-03 PROCEDURE — 99213 OFFICE O/P EST LOW 20 MIN: CPT | Performed by: FAMILY MEDICINE

## 2017-05-03 RX ORDER — ACYCLOVIR 200 MG/5ML
SUSPENSION ORAL
Refills: 0 | COMMUNITY
Start: 2017-04-26 | End: 2017-12-29

## 2017-05-03 RX ORDER — ALBUTEROL SULFATE 90 UG/1
2 AEROSOL, METERED RESPIRATORY (INHALATION) EVERY 6 HOURS PRN
Qty: 1 INHALER | Refills: 1 | OUTPATIENT
Start: 2017-05-03 | End: 2018-07-27 | Stop reason: SDUPTHER

## 2017-05-03 RX ADMIN — CYANOCOBALAMIN 1000 MCG: 1000 INJECTION, SOLUTION INTRAMUSCULAR; SUBCUTANEOUS at 14:52

## 2017-05-03 NOTE — MR AVS SNAPSHOT
"Jana Overton   5/3/2017 2:00 PM   Office Visit   MRN: [id number]    Department:  [facility]   Dept Phone:  [phone]    Description:  Female : 1955   Provider:  Amanda Bazzi M.D.           Reason for Visit     Sinus Problem thick mucus       Allergies as of 5/3/2017     Allergen Noted Reactions    Morphine 2007   Swelling    Methotrexate 2008   Hives, Rash    Roxanol [Morphine Sulfate] 2010       Throat swells    Tape 2012   Rash    Skin peels off; paper tape    Ativan [Lorazepam] 2015       States give me nightmares.     Azathioprine Sodium 10/20/2007   Hives    Celestone [Betamethasone Sodium Phosphate] 2012       Demerol 2007   Vomiting    Elavil [Amitriptyline] 2014       Nightmares      Fentanyl 2008       Patches caused skin breakdown, no pain relief    Kdc:Fentanyl 2008       Lyrica [Pregabalin] 2008       Methadone 2008       Methotrexate Derivatives 2008       Pseudoephedrine 2007   Vomiting    Remicade [Infliximab Injection] 10/20/2007   Hives    Sulfa Drugs 2010   Hives    Sulfasalazine 2013       Tizanidine Hydrochloride 2008         Vital Signs     Blood Pressure Pulse Temperature Respirations Height Weight    126/70 mmHg 83 36.5 °C (97.7 °F) 12 1.842 m (6' 0.5\") 72.576 kg (160 lb)    Body Mass Index Oxygen Saturation Smoking Status             21.39 kg/m2 93% Never Smoker          Basic Information     Date Of Birth Sex Race Ethnicity Preferred Language    1955 Female White Non- English      Problem List              ICD-10-CM Priority Class Noted - Resolved    Crohn's disease (CMS-HCC) K50.90   2009 - Present    Chronic pain G89.29   2009 - Present    Status post lumbar surgery Z98.890   2012 - Present    GERD (gastroesophageal reflux disease) K21.9   2012 - Present    History of cardiac murmur (Chronic) Z86.79   Unknown - Present   " Ileostomy in place (CMS-HCC) Z93.2   6/26/2013 - Present    Postherpetic neuralgia B02.29   6/24/2014 - Present    Multiple falls R29.6   6/24/2014 - Present    COPD (chronic obstructive pulmonary disease) (CMS-HCC) J44.9   6/24/2014 - Present    Rosacea L71.9   6/24/2014 - Present    Hypokalemia E87.6   6/24/2014 - Present    Sleep apnea G47.30   10/26/2014 - Present    Hypercalcemia E83.52   3/26/2015 - Present    Paroxysmal atrial fibrillation (CMS-HCC) I48.0   3/26/2015 - Present    History of DVT (deep vein thrombosis) Z86.718   11/23/2016 - Present    DDD (degenerative disc disease), lumbar M51.36   4/12/2017 - Present      Health Maintenance        Date Due Completion Dates    MAMMOGRAM 9/24/2016 9/24/2015, 5/21/2013, 11/1/2010, 1/24/2008, 1/24/2008    PAP SMEAR 11/23/2019 11/23/2016, 10/24/2014    IMM DTaP/Tdap/Td Vaccine (2 - Td) 12/4/2022 12/4/2012    COLONOSCOPY 5/24/2024 5/24/2014 (Prv Comp), 10/4/2011    Override on 5/24/2014: Previously completed            Current Immunizations     Influenza TIV (IM) 9/27/2015, 11/16/2013, 10/6/2012, 9/17/2007    Influenza Vaccine Pediatric 9/29/2009    Influenza Vaccine Quad Inj (Pf) 10/24/2014    Influenza Vaccine Quad Inj (Preserved) 9/21/2016    Pneumococcal Vaccine (UF)Historical Data 10/17/2007    Pneumococcal polysaccharide vaccine (PPSV-23) 10/6/2012    SHINGLES VACCINE 11/1/2014    Tdap Vaccine 12/4/2012      Below and/or attached are the medications your provider expects you to take. Review all of your home medications and newly ordered medications with your provider and/or pharmacist. Follow medication instructions as directed by your provider and/or pharmacist. Please keep your medication list with you and share with your provider. Update the information when medications are discontinued, doses are changed, or new medications (including over-the-counter products) are added; and carry medication information at all times in the event of emergency situations      Allergies:  MORPHINE - Swelling     METHOTREXATE - Hives,Rash     ROXANOL - (reactions not documented)     TAPE - Rash     ATIVAN - (reactions not documented)     AZATHIOPRINE SODIUM - Hives     CELESTONE - (reactions not documented)     DEMEROL - Vomiting     ELAVIL - (reactions not documented)     FENTANYL - (reactions not documented)     KDC:FENTANYL - (reactions not documented)     LYRICA - (reactions not documented)     METHADONE - (reactions not documented)     METHOTREXATE DERIVATIVES - (reactions not documented)     PSEUDOEPHEDRINE - Vomiting     REMICADE - Hives     SULFA DRUGS - Hives     SULFASALAZINE - (reactions not documented)     TIZANIDINE HYDROCHLORIDE - (reactions not documented)               Medications  Valid as of: May 03, 2017 -  3:50 PM    Generic Name Brand Name Tablet Size Instructions for use    Acyclovir (Suspension) ZOVIRAX 200 MG/5ML SHAKE LQ AND TK 10 ML PO BID FOR 7 DAYS        Albuterol Sulfate (Aero Soln) albuterol 108 (90 BASE) MCG/ACT Inhale 2 Puffs by mouth every 6 hours as needed for Shortness of Breath.        Cefdinir (Cap) OMNICEF 300 MG Take 1 Cap by mouth 2 times a day.        Cetirizine HCl (Tab) ZYRTEC 10 MG Take 10 mg by mouth 1 time daily as needed. For allergies        Cyanocobalamin (Solution) VITAMIN B-12 1000 MCG/ML 1 mL by Intramuscular route every 30 days.        DiazePAM (Tab) VALIUM 5 MG Take 0.5-1 Tabs by mouth every 6 hours as needed (muscle spasm).        Ergocalciferol (Cap) DRISDOL 18130 UNITS Take 1 Cap by mouth every 7 days.        Estradiol (Cream) ESTRACE 0.1 MG/GM Insert 0.5g vaginally three times each week.        Fluticasone Propionate (Suspension) FLONASE 50 MCG/ACT Spray 1-2 Sprays in nose 1 time daily as needed. Indications: Hayfever        Furosemide (Tab) LASIX 20 MG Take 20 mg by mouth 1 time daily as needed. Indications: Edema        Granisetron HCl (Tab) KYTRIL 1 MG Take 1 Tab by mouth every 12 hours as needed for Nausea/Vomiting.         "Guaifenesin-Codeine (Syrup) TUSSI-ORGANIDIN -10 MG/5ML Take 5 mL by mouth 3 times a day as needed for Cough.        Ipratropium Bromide (Solution) ATROVENT 0.03 % Spray 2 Sprays in nose 2 times a day.        Lidocaine (Patch) LIDODERM 5 %         Metoprolol Tartrate (Tab) LOPRESSOR 25 MG Take 1 Tab by mouth 3 times a day as needed. For palpitations        Nystatin (Suspension) MYCOSTATIN 162003 UNIT/ML Take 5 mL by mouth 4 times a day.        Ondansetron (TABLET DISPERSIBLE) ZOFRAN ODT 4 MG Take 2 Tabs by mouth every 8 hours as needed.        OxyCODONE HCl (Conc) oxycodone 20 MG/ML Take 30 mg by mouth every four hours as needed. 20mg/ml solution        Potassium Chloride (Cap CR) MICRO-K 10 MEQ Take 2 Caps by mouth 1 time daily as needed. WITH LASIX ONLY        Probiotic Product (Cap) PROBIOTIC DAILY  Take 1 Cap by mouth every day.        Promethazine-Codeine (Syrup) PHENERGAN-CODEINE 6.25-10 MG/5ML Take 5 mL by mouth 4 times a day as needed for Cough.        Scopolamine Base (PATCH 72 HR) TRANSDERM-SCOP 1.5 MG/3DAYS Apply 1 Patch to skin as directed every 72 hours.        Spironolactone (Tab) ALDACTONE 25 MG Take 1 Tab by mouth 2 times a day.        Sulfacetamide Sodium-Sulfur (Emulsion) Sulfacetamide Sodium-Sulfur 10-1 % Use as directed once daily.        Syringe/Needle (Disp) (Misc) BD INTEGRA SYRINGE 25G X 1\" 3 ML USE 1 SYRINGE AS DIRECTED EVERY 30 DAYS        Torsemide (Tab) DEMADEX 10 MG Take 1 Tab by mouth every day. Prn swelling        Zolpidem Tartrate (Tab) AMBIEN 10 MG TAKE 1 TABLET BY MOUTH AT BEDTIME AS NEEDED FOR SLEEP        .                 Medicines prescribed today were sent to:     [facility] [address] [place] [address], [place] - [address] [facility]    [address] [facility] [address]    Phone: [phone] Fax: [phone]    Open 24 Hours?: No      Medication refill instructions:       If your prescription bottle indicates you have medication refills " left, it is not necessary to call your provider’s office. Please contact your pharmacy and they will refill your medication.    If your prescription bottle indicates you do not have any refills left, you may request refills at any time through one of the following ways: The online [facility] system (except Urgent Care), by calling your provider’s office, or by asking your pharmacy to contact your provider’s office with a refill request. Medication refills are processed only during regular business hours and may not be available until the next business day. Your provider may request additional information or to have a follow-up visit with you prior to refilling your medication.   *Please Note: Medication refills are assigned a new Rx number when refilled electronically. Your pharmacy may indicate that no refills were authorized even though a new prescription for the same medication is available at the pharmacy. Please request the medicine by name with the pharmacy before contacting your provider for a refill.        Other Notes About Your Plan     Pain Mangement:  Dr. Telles  Vascular:  Dr. Cevallos.  GI: Dr. Mahan  Card: [facility]  Pulm:  [facility]             MyChart Status: Patient Declined

## 2017-05-04 NOTE — PROGRESS NOTES
Chief Complaint   Patient presents with   • Sinus Problem     thick mucus and ear popping       HISTORY OF PRESENT ILLNESS: Patient is a 61 y.o. female established patient who presents today to follow-up on chronic postnasal drainage. She feels like she gets quite a bit of sinus drainage that then collects in her throat. She is often clearing her throat and spitting up some thick mucus. Her ears also feel plugged frequently. [place] and [place] she comes down into town she feels popping and sometimes discomfort. She is using Flonase daily. She is using Mucinex but she is using an extended release Mucinex twice daily. She is drinking plenty of fluids. She has seen ENT in the past. He offered her PE tubes in her ears but wasn't sure they would help that much. She really does not want to go that route.      Patient Active Problem List    Diagnosis Date Noted   • DDD (degenerative disc disease), lumbar 04/12/2017   • History of DVT (deep vein thrombosis) 11/23/2016   • Hypercalcemia 03/26/2015   • Paroxysmal atrial fibrillation (CMS-HCC) 03/26/2015   • Sleep apnea 10/26/2014   • Postherpetic neuralgia 06/24/2014   • Multiple falls 06/24/2014   • COPD (chronic obstructive pulmonary disease) (CMS-HCC) 06/24/2014   • Rosacea 06/24/2014   • Hypokalemia 06/24/2014   • Ileostomy in place (CMS-HCC) 06/26/2013   • History of cardiac murmur    • GERD (gastroesophageal reflux disease) 12/05/2012   • Status post lumbar surgery 07/16/2012   • Crohn's disease (CMS-HCC) 09/23/2009   • Chronic pain 09/23/2009     Current Outpatient Prescriptions on File Prior to Visit   Medication Sig Dispense Refill   • diazepam (VALIUM) 5 MG Tab Take 0.5-1 Tabs by mouth every 6 hours as needed (muscle spasm). 120 Tab 0   • spironolactone (ALDACTONE) 25 MG Tab Take 1 Tab by mouth 2 times a day. 60 Tab 3   • vitamin D, Ergocalciferol, (DRISDOL) 30458 UNITS Cap capsule Take 1 Cap by mouth every 7 days. 4 Cap 11   • metoprolol  "(LOPRESSOR) 25 MG Tab Take 1 Tab by mouth 3 times a day as needed. For palpitations 60 Tab 1   • torsemide (DEMADEX) 10 MG tablet Take 1 Tab by mouth every day. Prn swelling 30 Tab 3   • guaifenesin-codeine (TUSSI-ORGANIDIN NR) 100-10 MG/5ML syrup Take 5 mL by mouth 3 times a day as needed for Cough. 120 mL 0                 • potassium chloride (MICRO-K) 10 MEQ capsule Take 2 Caps by mouth 1 time daily as needed. WITH LASIX ONLY 60 Cap 2   • fluticasone (FLONASE) 50 MCG/ACT nasal spray Spray 1-2 Sprays in nose 1 time daily as needed. Indications: Hayfever 1 Bottle 3   • ipratropium (ATROVENT) 0.03 % Solution Spray 2 Sprays in nose 2 times a day. 1 Bottle 2   • zolpidem (AMBIEN) 10 MG Tab TAKE 1 TABLET BY MOUTH AT BEDTIME AS NEEDED FOR SLEEP 30 Tab 0   • Sulfacetamide Sodium-Sulfur 10-1 % Emulsion Use as directed once daily. 1 Tube 1   • scopolamine (TRANSDERM-SCOP) 1.5 MG/3DAYS PATCH 72 HR Apply 1 Patch to skin as directed every 72 hours. 4 Patch 1   • estradiol (ESTRACE) 0.1 MG/GM vaginal cream Insert 0.5g vaginally three times each week. 1 Tube 6   • granisetron (KYTRIL) 1 MG Tab Take 1 Tab by mouth every 12 hours as needed for Nausea/Vomiting. 30 Tab 1   • ondansetron (ZOFRAN ODT) 4 MG TABLET DISPERSIBLE Take 2 Tabs by mouth every 8 hours as needed. 30 Tab 3   • nystatin (MYCOSTATIN) 362952 UNIT/ML Suspension Take 5 mL by mouth 4 times a day. 120 mL 1   • BD INTEGRA SYRINGE 25G X 1\" 3 ML Misc USE 1 SYRINGE AS DIRECTED EVERY 30 DAYS 11 Each 0   • lidocaine (LIDODERM) 5 % PTCH   1   • furosemide (LASIX) 20 MG TABS Take 20 mg by mouth 1 time daily as needed. Indications: Edema     • Probiotic Product (PROBIOTIC DAILY) CAPS Take 1 Cap by mouth every day.     • cyanocobalamin (VITAMIN B-12) 1000 MCG/ML SOLN 1 mL by Intramuscular route every 30 days. 1 mL 0   • cetirizine (ZYRTEC) 10 MG TABS Take 10 mg by mouth 1 time daily as needed. For allergies     • oxycodone 20 MG/ML CONC Take 30 mg by mouth every four hours as " "needed. 20mg/ml solution       Current Facility-Administered Medications on File Prior to Visit   Medication Dose Route Frequency Provider Last Rate Last Dose   • cyanocobalamin (VITAMIN B-12) injection 1,000 mcg  1,000 mcg Intramuscular Q30 DAYS Amanda Bazzi M.D.   1,000 mcg at 05/03/17 1452         Past medical, surgical, family, and social history is reviewed and updated in Epic chart by me today.   Medications and allergies reviewed and updated in Epic chart by me today.     REVIEW OF SYSTEMS:  GENERAL:  chronic fatigue--she actually feels better than she has for a long time. No weight loss.  HEENT: As above.  CV:  No chest pain,dyspnea,palpitations  RESP:  No sob,cough,wheezing or hemoptysis.  GI: Colostomy and chronic abdominal pain  :  No dysuria, polyuria, hematuria, incontinence, or nocturia.  MS: Intermittent joint pain and low back pain.  NEURO:  No seizures, syncope, paralysis, tremor, or weakness.  SKIN: No new or concerning skin lesions or changes.   PSYCH: Mood fine.    Filed Vitals:    05/03/17 1410   BP: 126/70   Pulse: 83   Temp: 36.5 °C (97.7 °F)   Resp: 12   Height: 1.842 m (6' 0.5\")   Weight: 72.576 kg (160 lb)   SpO2: 93%     Physical Exam:  GENERAL;  WD/WN, No acute distress.  HEENT:  PERRLA, EOMI, no conjuctival injection, no icterus.                 TM's normal bilat.                 Nasal mucosa erythematous and edematous.                 Pharynx mildly injected. She does have some postnasal mucus.  NECK: Supple with no adenopathy or thyromegaly.  LUNGS: Clear with no rales, rhonchi, or wheezes.  COR: RR with no murmur, gallop or rub.  EXT: No edema.          Assessment/Plan:  1. PND (post-nasal drip) . Will have her try Mucinex 400 mg granules, 2 every 8-12 hours. It may be with her colostomy that the extended release is not working as well. Also encouraged her to do sinus irrigation and drink adequate fluids. She'll continue Flonase. Follow-up as needed.     2. ETD (eustachian tube " dysfunction), bilateral

## 2017-05-10 ENCOUNTER — TELEPHONE (OUTPATIENT)
Dept: INTERNAL MEDICINE | Facility: IMAGING CENTER | Age: 62
End: 2017-05-10

## 2017-05-10 DIAGNOSIS — T78.40XA ALLERGIC REACTION, INITIAL ENCOUNTER: ICD-10-CM

## 2017-05-10 DIAGNOSIS — Z91.09 ENVIRONMENTAL ALLERGIES: ICD-10-CM

## 2017-05-10 NOTE — TELEPHONE ENCOUNTER
"Jana says that the sinus irrigation goes not her throat instead of the other nostril no matter how much she tilts her head.  Also she is so dizzy that she is very nauseous.  She also is getting what she calls \"head squeezes\" which she states are similar to \"brain freezes but not\".   She would like to talk with you when you have a chance.  "

## 2017-05-12 RX ORDER — IPRATROPIUM BROMIDE AND ALBUTEROL SULFATE 2.5; .5 MG/3ML; MG/3ML
3 SOLUTION RESPIRATORY (INHALATION) 4 TIMES DAILY
Qty: 75 ML | Refills: 2 | Status: SHIPPED | OUTPATIENT
Start: 2017-05-12 | End: 2018-12-04

## 2017-05-12 RX ORDER — CYANOCOBALAMIN 1000 UG/ML
INJECTION, SOLUTION INTRAMUSCULAR; SUBCUTANEOUS
Qty: 10 ML | Refills: 1 | Status: SHIPPED | OUTPATIENT
Start: 2017-05-12 | End: 2017-12-29

## 2017-05-15 RX ORDER — EPINEPHRINE 0.3 MG/.3ML
0.3 INJECTION SUBCUTANEOUS ONCE
Qty: 0.3 ML | Refills: 1 | Status: SHIPPED | OUTPATIENT
Start: 2017-05-15 | End: 2017-05-15

## 2017-05-15 NOTE — TELEPHONE ENCOUNTER
Spoke with Jana. Ears still bother her--feel plugged etc.  She is using zyrtec , flonase and atrovent ns daily.    She did have an allergic reaction to something this weekend. She states they were at a friend's house. She went outside and immediately felt some swelling in her mouth and wheezing. She went right home took 2 Benadryl and did a breathing treatment. Today she still has a scratchy throat and a little wheezing. She has seen Dr. Morales in the past. She has an outdated EpiPen.    Will update her EpiPen, told her to always trouble with Benadryl. Continue Benadryl 25 mg every 6 hours for the next 2 days. Referral to Dr. Morales.

## 2017-06-05 ENCOUNTER — TELEPHONE (OUTPATIENT)
Dept: INTERNAL MEDICINE | Facility: IMAGING CENTER | Age: 62
End: 2017-06-05

## 2017-06-05 NOTE — TELEPHONE ENCOUNTER
Jana cancelled her appointment today because her head and her ears are hurting so bad she doesn't think she will make it down the hill.  She wants to know if you have any suggestions for her.

## 2017-08-14 ENCOUNTER — TELEPHONE (OUTPATIENT)
Dept: INTERNAL MEDICINE | Facility: IMAGING CENTER | Age: 62
End: 2017-08-14

## 2017-08-14 DIAGNOSIS — E53.8 VITAMIN B 12 DEFICIENCY: ICD-10-CM

## 2017-08-14 DIAGNOSIS — R53.83 FATIGUE, UNSPECIFIED TYPE: ICD-10-CM

## 2017-08-14 NOTE — TELEPHONE ENCOUNTER
Jana called and stated that she is fatigued all the time.  She sleeps 15-16 hours a day and sleeps right through everything including the phone next to her.  She was wondering if she could get some labs drawn to see if anything pops up.

## 2017-08-17 ENCOUNTER — HOSPITAL ENCOUNTER (OUTPATIENT)
Dept: LAB | Facility: MEDICAL CENTER | Age: 62
End: 2017-08-17
Attending: FAMILY MEDICINE
Payer: MEDICARE

## 2017-08-17 DIAGNOSIS — E53.8 VITAMIN B 12 DEFICIENCY: ICD-10-CM

## 2017-08-17 DIAGNOSIS — R53.83 FATIGUE, UNSPECIFIED TYPE: ICD-10-CM

## 2017-08-17 LAB
ALBUMIN SERPL BCP-MCNC: 4.3 G/DL (ref 3.2–4.9)
ALBUMIN/GLOB SERPL: 1.2 G/DL
ALP SERPL-CCNC: 151 U/L (ref 30–99)
ALT SERPL-CCNC: 51 U/L (ref 2–50)
ANION GAP SERPL CALC-SCNC: 6 MMOL/L (ref 0–11.9)
AST SERPL-CCNC: 49 U/L (ref 12–45)
BASOPHILS # BLD AUTO: 1.1 % (ref 0–1.8)
BASOPHILS # BLD: 0.07 K/UL (ref 0–0.12)
BILIRUB SERPL-MCNC: 0.6 MG/DL (ref 0.1–1.5)
BUN SERPL-MCNC: 20 MG/DL (ref 8–22)
CALCIUM SERPL-MCNC: 10.2 MG/DL (ref 8.5–10.5)
CHLORIDE SERPL-SCNC: 103 MMOL/L (ref 96–112)
CO2 SERPL-SCNC: 31 MMOL/L (ref 20–33)
CREAT SERPL-MCNC: 0.87 MG/DL (ref 0.5–1.4)
EOSINOPHIL # BLD AUTO: 0.23 K/UL (ref 0–0.51)
EOSINOPHIL NFR BLD: 3.5 % (ref 0–6.9)
ERYTHROCYTE [DISTWIDTH] IN BLOOD BY AUTOMATED COUNT: 41.3 FL (ref 35.9–50)
GFR SERPL CREATININE-BSD FRML MDRD: >60 ML/MIN/1.73 M 2
GLOBULIN SER CALC-MCNC: 3.5 G/DL (ref 1.9–3.5)
GLUCOSE SERPL-MCNC: 82 MG/DL (ref 65–99)
HCT VFR BLD AUTO: 44.8 % (ref 37–47)
HGB BLD-MCNC: 14.5 G/DL (ref 12–16)
IMM GRANULOCYTES # BLD AUTO: 0.01 K/UL (ref 0–0.11)
IMM GRANULOCYTES NFR BLD AUTO: 0.2 % (ref 0–0.9)
LYMPHOCYTES # BLD AUTO: 2.13 K/UL (ref 1–4.8)
LYMPHOCYTES NFR BLD: 32.4 % (ref 22–41)
MCH RBC QN AUTO: 29.9 PG (ref 27–33)
MCHC RBC AUTO-ENTMCNC: 32.4 G/DL (ref 33.6–35)
MCV RBC AUTO: 92.4 FL (ref 81.4–97.8)
MONOCYTES # BLD AUTO: 0.52 K/UL (ref 0–0.85)
MONOCYTES NFR BLD AUTO: 7.9 % (ref 0–13.4)
NEUTROPHILS # BLD AUTO: 3.61 K/UL (ref 2–7.15)
NEUTROPHILS NFR BLD: 54.9 % (ref 44–72)
NRBC # BLD AUTO: 0 K/UL
NRBC BLD AUTO-RTO: 0 /100 WBC
PLATELET # BLD AUTO: 224 K/UL (ref 164–446)
PMV BLD AUTO: 10.6 FL (ref 9–12.9)
POTASSIUM SERPL-SCNC: 4.5 MMOL/L (ref 3.6–5.5)
PROT SERPL-MCNC: 7.8 G/DL (ref 6–8.2)
RBC # BLD AUTO: 4.85 M/UL (ref 4.2–5.4)
SODIUM SERPL-SCNC: 140 MMOL/L (ref 135–145)
TSH SERPL DL<=0.005 MIU/L-ACNC: 1.07 UIU/ML (ref 0.3–3.7)
VIT B12 SERPL-MCNC: 742 PG/ML (ref 211–911)
WBC # BLD AUTO: 6.6 K/UL (ref 4.8–10.8)

## 2017-08-17 PROCEDURE — 36415 COLL VENOUS BLD VENIPUNCTURE: CPT

## 2017-08-17 PROCEDURE — 80053 COMPREHEN METABOLIC PANEL: CPT

## 2017-08-17 PROCEDURE — 85025 COMPLETE CBC W/AUTO DIFF WBC: CPT

## 2017-08-17 PROCEDURE — 84443 ASSAY THYROID STIM HORMONE: CPT

## 2017-08-17 PROCEDURE — 82607 VITAMIN B-12: CPT

## 2017-10-11 ENCOUNTER — HOSPITAL ENCOUNTER (OUTPATIENT)
Dept: LAB | Facility: MEDICAL CENTER | Age: 62
End: 2017-10-11
Attending: OTOLARYNGOLOGY
Payer: MEDICARE

## 2017-10-11 DIAGNOSIS — F41.9 ANXIETY: ICD-10-CM

## 2017-10-11 PROCEDURE — 36415 COLL VENOUS BLD VENIPUNCTURE: CPT

## 2017-10-11 PROCEDURE — 85652 RBC SED RATE AUTOMATED: CPT

## 2017-10-11 RX ORDER — DIAZEPAM 5 MG/1
TABLET ORAL
Qty: 120 TAB | Refills: 0 | Status: SHIPPED
Start: 2017-10-11 | End: 2017-11-02 | Stop reason: SDUPTHER

## 2017-10-12 LAB — ERYTHROCYTE [SEDIMENTATION RATE] IN BLOOD BY WESTERGREN METHOD: 23 MM/HOUR (ref 0–30)

## 2017-10-13 ENCOUNTER — TELEPHONE (OUTPATIENT)
Dept: INTERNAL MEDICINE | Facility: IMAGING CENTER | Age: 62
End: 2017-10-13

## 2017-10-13 NOTE — TELEPHONE ENCOUNTER
Discussed results---elevated LFT's.  They were elevated last year and she had an ultrasound which showed normal liver and gallbladder. She also saw Dr. Dixon ordered some liver function tests, autoimmune tests and hepatitis panel which were all negative.  They are mildly elevated and I recommend we monitor and recheck in February.

## 2017-10-13 NOTE — TELEPHONE ENCOUNTER
----- Message from Dianne Sandoval R.N. sent at 10/13/2017  8:51 AM PDT -----  I don't know why all of a sudden this is an issue but she wants to talk with you about her last CMP results with the liver increase.  Please call her today.    M

## 2017-10-17 ENCOUNTER — HOSPITAL ENCOUNTER (OUTPATIENT)
Dept: RADIOLOGY | Facility: MEDICAL CENTER | Age: 62
End: 2017-10-17
Attending: FAMILY MEDICINE
Payer: MEDICARE

## 2017-10-17 DIAGNOSIS — Z12.31 SCREENING MAMMOGRAM, ENCOUNTER FOR: ICD-10-CM

## 2017-10-17 PROCEDURE — G0202 SCR MAMMO BI INCL CAD: HCPCS

## 2017-10-23 DIAGNOSIS — B37.0 THRUSH: ICD-10-CM

## 2017-10-31 ENCOUNTER — APPOINTMENT (RX ONLY)
Dept: URBAN - METROPOLITAN AREA CLINIC 20 | Facility: CLINIC | Age: 62
Setting detail: DERMATOLOGY
End: 2017-10-31

## 2017-10-31 DIAGNOSIS — L57.8 OTHER SKIN CHANGES DUE TO CHRONIC EXPOSURE TO NONIONIZING RADIATION: ICD-10-CM

## 2017-10-31 DIAGNOSIS — L57.0 ACTINIC KERATOSIS: ICD-10-CM

## 2017-10-31 DIAGNOSIS — L82.1 OTHER SEBORRHEIC KERATOSIS: ICD-10-CM

## 2017-10-31 DIAGNOSIS — D22 MELANOCYTIC NEVI: ICD-10-CM

## 2017-10-31 DIAGNOSIS — L81.4 OTHER MELANIN HYPERPIGMENTATION: ICD-10-CM

## 2017-10-31 DIAGNOSIS — D18.0 HEMANGIOMA: ICD-10-CM

## 2017-10-31 DIAGNOSIS — D485 NEOPLASM OF UNCERTAIN BEHAVIOR OF SKIN: ICD-10-CM

## 2017-10-31 PROBLEM — D48.5 NEOPLASM OF UNCERTAIN BEHAVIOR OF SKIN: Status: ACTIVE | Noted: 2017-10-31

## 2017-10-31 PROBLEM — D18.01 HEMANGIOMA OF SKIN AND SUBCUTANEOUS TISSUE: Status: ACTIVE | Noted: 2017-10-31

## 2017-10-31 PROBLEM — D22.5 MELANOCYTIC NEVI OF TRUNK: Status: ACTIVE | Noted: 2017-10-31

## 2017-10-31 PROCEDURE — ? LIQUID NITROGEN

## 2017-10-31 PROCEDURE — ? ADDITIONAL NOTES

## 2017-10-31 PROCEDURE — ? BIOPSY BY SHAVE METHOD

## 2017-10-31 PROCEDURE — 17003 DESTRUCT PREMALG LES 2-14: CPT

## 2017-10-31 PROCEDURE — 11100: CPT | Mod: 59

## 2017-10-31 PROCEDURE — ? COUNSELING

## 2017-10-31 PROCEDURE — 17000 DESTRUCT PREMALG LESION: CPT

## 2017-10-31 PROCEDURE — 99214 OFFICE O/P EST MOD 30 MIN: CPT | Mod: 25

## 2017-10-31 ASSESSMENT — LOCATION SIMPLE DESCRIPTION DERM
LOCATION SIMPLE: LEFT UPPER ARM
LOCATION SIMPLE: LEFT CHEEK
LOCATION SIMPLE: RIGHT LOWER LEG
LOCATION SIMPLE: LEFT THIGH
LOCATION SIMPLE: LEFT FOREARM
LOCATION SIMPLE: RIGHT THIGH
LOCATION SIMPLE: RIGHT PRETIBIAL REGION
LOCATION SIMPLE: RIGHT FOREARM
LOCATION SIMPLE: RIGHT UPPER ARM
LOCATION SIMPLE: CHEST
LOCATION SIMPLE: LEFT CALF
LOCATION SIMPLE: RIGHT CHEEK
LOCATION SIMPLE: RIGHT UPPER BACK

## 2017-10-31 ASSESSMENT — LOCATION DETAILED DESCRIPTION DERM
LOCATION DETAILED: RIGHT PROXIMAL DORSAL FOREARM
LOCATION DETAILED: RIGHT PROXIMAL PRETIBIAL REGION
LOCATION DETAILED: LEFT ANTERIOR DISTAL THIGH
LOCATION DETAILED: LEFT INFERIOR CENTRAL MALAR CHEEK
LOCATION DETAILED: RIGHT ANTERIOR DISTAL THIGH
LOCATION DETAILED: RIGHT CENTRAL MALAR CHEEK
LOCATION DETAILED: RIGHT LATERAL PROXIMAL CALF
LOCATION DETAILED: RIGHT MID-UPPER BACK
LOCATION DETAILED: RIGHT SUPERIOR MEDIAL UPPER BACK
LOCATION DETAILED: LEFT PROXIMAL DORSAL FOREARM
LOCATION DETAILED: LEFT ANTERIOR PROXIMAL UPPER ARM
LOCATION DETAILED: RIGHT INFERIOR UPPER BACK
LOCATION DETAILED: RIGHT ANTERIOR PROXIMAL UPPER ARM
LOCATION DETAILED: LEFT DISTAL CALF
LOCATION DETAILED: LEFT MEDIAL SUPERIOR CHEST

## 2017-10-31 ASSESSMENT — LOCATION ZONE DERM
LOCATION ZONE: FACE
LOCATION ZONE: TRUNK
LOCATION ZONE: LEG
LOCATION ZONE: ARM

## 2017-10-31 NOTE — PROCEDURE: BIOPSY BY SHAVE METHOD
Post-Care Instructions: I reviewed with the patient in detail post-care instructions. Patient is to keep the biopsy site dry overnight, and then apply bacitracin twice daily until healed. Patient may apply hydrogen peroxide soaks to remove any crusting.
Notification Instructions: Patient will be notified of biopsy results. However, patient instructed to call the office if not contacted within 2 weeks.
Consent: Written consent was obtained and risks were reviewed including but not limited to scarring, infection, bleeding, scabbing, incomplete removal, nerve damage and allergy to anesthesia.
Detail Level: Detailed
Billing Type: Third-Party Bill
Biopsy Method: Personna blade
Biopsy Type: H and E
Anesthesia Volume In Cc: 0.5
Curettage Text: The wound bed was treated with curettage after the biopsy was performed.
Destruction After The Procedure: No
Anesthesia Type: 1% lidocaine with 1:100,000 epinephrine and 408mcg clindamycin/ml and a 1:10 solution of 8.4% sodium bicarbonate
Type Of Destruction Used: Curettage
Additional Anesthesia Volume In Cc (Will Not Render If 0): 0
Hemostasis: Drysol and Electrocautery
Electrodesiccation Text: The wound bed was treated with electrodesiccation after the biopsy was performed.
Cryotherapy Text: The wound bed was treated with cryotherapy after the biopsy was performed.
Wound Care: Vaseline
Lab: [id number]
Electrodesiccation And Curettage Text: The wound bed was treated with electrodesiccation and curettage after the biopsy was performed.
Lab Facility: 
Dressing: Band-Aid
Silver Nitrate Text: The wound bed was treated with silver nitrate after the biopsy was performed.

## 2017-10-31 NOTE — PROCEDURE: LIQUID NITROGEN
Render Post-Care Instructions In Note?: no
Number Of Freeze-Thaw Cycles: 1 freeze-thaw cycle
Detail Level: Detailed
Consent: The patient's consent was obtained including but not limited to risks of crusting, scabbing, blistering, scarring, darker or lighter pigmentary change, recurrence, incomplete removal and infection.
Post-Care Instructions: I reviewed with the patient in detail post-care instructions. Patient is to wear sunprotection, and avoid picking at any of the treated lesions. Pt may apply Vaseline to crusted or scabbing areas.
Duration Of Freeze Thaw-Cycle (Seconds): 3

## 2017-11-02 ENCOUNTER — OFFICE VISIT (OUTPATIENT)
Dept: INTERNAL MEDICINE | Facility: IMAGING CENTER | Age: 62
End: 2017-11-02
Payer: MEDICARE

## 2017-11-02 VITALS
WEIGHT: 158 LBS | BODY MASS INDEX: 21.4 KG/M2 | OXYGEN SATURATION: 97 % | DIASTOLIC BLOOD PRESSURE: 70 MMHG | SYSTOLIC BLOOD PRESSURE: 122 MMHG | HEART RATE: 114 BPM | RESPIRATION RATE: 14 BRPM | HEIGHT: 72 IN | TEMPERATURE: 98.5 F

## 2017-11-02 DIAGNOSIS — R45.4 IRRITABILITY AND ANGER: ICD-10-CM

## 2017-11-02 DIAGNOSIS — R53.1 WEAKNESS: ICD-10-CM

## 2017-11-02 DIAGNOSIS — R11.0 CHRONIC NAUSEA: ICD-10-CM

## 2017-11-02 DIAGNOSIS — F41.9 ANXIETY: ICD-10-CM

## 2017-11-02 DIAGNOSIS — R11.0 NAUSEA: ICD-10-CM

## 2017-11-02 DIAGNOSIS — M54.2 NECK PAIN: ICD-10-CM

## 2017-11-02 DIAGNOSIS — R26.89 POOR BALANCE: ICD-10-CM

## 2017-11-02 DIAGNOSIS — R69 CHRONIC DISEASE: ICD-10-CM

## 2017-11-02 PROCEDURE — 99213 OFFICE O/P EST LOW 20 MIN: CPT | Performed by: FAMILY MEDICINE

## 2017-11-02 RX ORDER — POTASSIUM CHLORIDE 750 MG/1
20 CAPSULE, EXTENDED RELEASE ORAL
Qty: 60 CAP | Refills: 2 | Status: SHIPPED | OUTPATIENT
Start: 2017-11-02 | End: 2018-12-04

## 2017-11-02 RX ORDER — ONDANSETRON 4 MG/1
8 TABLET, ORALLY DISINTEGRATING ORAL EVERY 8 HOURS PRN
Qty: 30 TAB | Refills: 3 | Status: SHIPPED | OUTPATIENT
Start: 2017-11-02 | End: 2018-03-06 | Stop reason: SDUPTHER

## 2017-11-02 RX ORDER — DIAZEPAM 5 MG/1
TABLET ORAL
Qty: 120 TAB | Refills: 0 | Status: SHIPPED
Start: 2017-11-02 | End: 2018-07-10 | Stop reason: SDUPTHER

## 2017-11-02 RX ORDER — GRANISETRON HYDROCHLORIDE 1 MG/1
1 TABLET, FILM COATED ORAL EVERY 12 HOURS PRN
Qty: 30 TAB | Refills: 1 | Status: SHIPPED | OUTPATIENT
Start: 2017-11-02 | End: 2017-11-13 | Stop reason: SDUPTHER

## 2017-11-06 NOTE — PROGRESS NOTES
Chief Complaint   Patient presents with   • Other       HISTORY OF PRESENT ILLNESS: Patient is a 62 y.o. female established patient who presents today With her . She has quite a bit of irritability and anger today. She is angry that her  made the appointment.     Jana has multiple chronic conditions, including Crohn's disease. She has a colostomy and daily chronic abdominal pain. She has been on chronic opioid therapy and is followed by Dr. Telles. She is angry because she feels like she is being judged because she is dependent on pain medications. She states has been no significant change in her condition.    Her  has retired and she is angry that she is not able to join him in some of his activities. She has pain and weakness. She had a few more falls. She is very frustrated. She has done physical therapy in the past and is willing to do that again.     She is interested and willing to counseling. She and her  are seeing a couples therapist  That she would benefit from some individual therapy. She denies any thoughts of hopelessness or suicidal. She does state every day seems to be a bit more challenging.      Patient Active Problem List    Diagnosis Date Noted   • DDD (degenerative disc disease), lumbar 04/12/2017   • History of DVT (deep vein thrombosis) 11/23/2016   • Hypercalcemia 03/26/2015   • Paroxysmal atrial fibrillation (CMS-HCC) 03/26/2015   • Sleep apnea 10/26/2014   • Postherpetic neuralgia 06/24/2014   • Multiple falls 06/24/2014   • COPD (chronic obstructive pulmonary disease) (CMS-HCC) 06/24/2014   • Rosacea 06/24/2014   • Hypokalemia 06/24/2014   • Ileostomy in place (CMS-HCC) 06/26/2013   • History of cardiac murmur    • GERD (gastroesophageal reflux disease) 12/05/2012   • Status post lumbar surgery 07/16/2012   • Crohn's disease (CMS-HCC) 09/23/2009   • Chronic pain 09/23/2009     Current Outpatient Prescriptions on File Prior to Visit   Medication Sig Dispense  "Refill   • spironolactone (ALDACTONE) 25 MG Tab Take 1 Tab by mouth 2 times a day. 60 Tab 3   • metoprolol (LOPRESSOR) 25 MG Tab Take 1 Tab by mouth 3 times a day as needed. For palpitations 60 Tab 1   • estradiol (ESTRACE) 0.1 MG/GM vaginal cream Insert 0.5g vaginally three times each week. 1 Tube 6   • nystatin (MYCOSTATIN) 609845 UNIT/ML Suspension Take 5 mL by mouth 4 times a day. 120 mL 1   • ipratropium-albuterol (DUONEB) 0.5-2.5 (3) MG/3ML nebulizer solution 3 mL by Nebulization route 4 times a day. 75 mL 2   • cyanocobalamin (VITAMIN B-12) 1000 MCG/ML Solution INJECT 1 ML IN THE MUSCLE EVERY 30 DAYS 10 mL 1   • albuterol 108 (90 BASE) MCG/ACT Aero Soln inhalation aerosol Inhale 2 Puffs by mouth every 6 hours as needed for Shortness of Breath. 1 Inhaler 1   • acyclovir (ZOVIRAX) 200 MG/5ML Suspension SHAKE LQ AND TK 10 ML PO BID FOR 7 DAYS  0   • vitamin D, Ergocalciferol, (DRISDOL) 04051 UNITS Cap capsule Take 1 Cap by mouth every 7 days. 4 Cap 11   • torsemide (DEMADEX) 10 MG tablet Take 1 Tab by mouth every day. Prn swelling 30 Tab 3   • guaifenesin-codeine (TUSSI-ORGANIDIN NR) 100-10 MG/5ML syrup Take 5 mL by mouth 3 times a day as needed for Cough. 120 mL 0   • promethazine-codeine (PHENERGAN-CODEINE) 6.25-10 MG/5ML Syrup Take 5 mL by mouth 4 times a day as needed for Cough. 120 mL 0   • fluticasone (FLONASE) 50 MCG/ACT nasal spray Spray 1-2 Sprays in nose 1 time daily as needed. Indications: Hayfever 1 Bottle 3   • ipratropium (ATROVENT) 0.03 % Solution Spray 2 Sprays in nose 2 times a day. 1 Bottle 2   • zolpidem (AMBIEN) 10 MG Tab TAKE 1 TABLET BY MOUTH AT BEDTIME AS NEEDED FOR SLEEP 30 Tab 0   • Sulfacetamide Sodium-Sulfur 10-1 % Emulsion Use as directed once daily. 1 Tube 1   • scopolamine (TRANSDERM-SCOP) 1.5 MG/3DAYS PATCH 72 HR Apply 1 Patch to skin as directed every 72 hours. 4 Patch 1   • BD INTEGRA SYRINGE 25G X 1\" 3 ML Misc USE 1 SYRINGE AS DIRECTED EVERY 30 DAYS 11 Each 0   • lidocaine " "(LIDODERM) 5 % PTCH   1   • furosemide (LASIX) 20 MG TABS Take 20 mg by mouth 1 time daily as needed. Indications: Edema     • Probiotic Product (PROBIOTIC DAILY) CAPS Take 1 Cap by mouth every day.     • cyanocobalamin (VITAMIN B-12) 1000 MCG/ML SOLN 1 mL by Intramuscular route every 30 days. 1 mL 0   • cetirizine (ZYRTEC) 10 MG TABS Take 10 mg by mouth 1 time daily as needed. For allergies     • oxycodone 20 MG/ML CONC Take 30 mg by mouth every four hours as needed. 20mg/ml solution       Current Facility-Administered Medications on File Prior to Visit   Medication Dose Route Frequency Provider Last Rate Last Dose   • cyanocobalamin (VITAMIN B-12) injection 1,000 mcg  1,000 mcg Intramuscular Q30 DAYS Amanda Bazzi M.D.   1,000 mcg at 05/03/17 1452         Past medical, surgical, family, and social history is reviewed and updated in Epic chart by me today.   Medications and allergies reviewed and updated in Epic chart by me today.     REVIEW OF SYSTEMS:  GENERAL:Chronic fatigue. She lost about 20 pounds in the spring, but has maintained through the summer and fall.  HEENT:  Ears--She has been having chronic problems is going to have tubes placed by Dr. Borges.                 Eyes--no blurred vision, no discharge or pain                 Throat--No sore throat, no dysphagia, no hoarseness  CV:  No chest pain,dyspnea,palpitations or edema.  RESP:  No sob,cough,wheezing or hemoptysis.  GI:She has a colostomy and has daily pain, nausea. No change in her stools.  :  No dysuria, polyuria, hematuria, incontinence, or nocturia.  MS:  Neck pain, back pain, joint pain.  NEURO:  No seizures, syncope, paralysis, tremor, or weakness.  SKIN: No new or concerning skin lesions or changes.   PSYCH: Mood as above    Vitals:    11/02/17 1100   BP: 122/70   Pulse: (!) 114   Resp: 14   Temp: 36.9 °C (98.5 °F)   SpO2: 97%   Weight: 71.7 kg (158 lb)   Height: 1.84 m (6' 0.44\")     Physical Exam:  Well-nourished, well-developed, " female.  Angry and irritable today.  Mood: She admits that she is frustrated with her pain and her limitations. She denies that she is suicidal. No thoughts of hopelessness.      Assessment/Plan:  1. Neck pain  REFERRAL TO PHYSICAL THERAPY Reason for Therapy: Eval/Treat/Report   2. Weakness  REFERRAL TO PHYSICAL THERAPY Reason for Therapy: Eval/Treat/Report   3. Poor balance  REFERRAL TO PHYSICAL THERAPY Reason for Therapy: Eval/Treat/Report   4. Irritability and anger  REFERRAL TO BEHAVIORAL HEALTH   5. Chronic disease  REFERRAL TO BEHAVIORAL HEALTH      Referral for individual counseling. I encouraged him to continue with couples counseling and referral for physical therapy to work on balance and strengthening.   Follow up 1 months and prn.

## 2017-11-09 ENCOUNTER — TELEPHONE (OUTPATIENT)
Dept: INTERNAL MEDICINE | Facility: IMAGING CENTER | Age: 62
End: 2017-11-09

## 2017-11-09 DIAGNOSIS — B37.9 YEAST INFECTION: ICD-10-CM

## 2017-11-09 RX ORDER — FLUCONAZOLE 150 MG/1
150 TABLET ORAL DAILY
Qty: 3 TAB | Refills: 0 | Status: SHIPPED | OUTPATIENT
Start: 2017-11-09 | End: 2017-11-12

## 2017-11-09 NOTE — TELEPHONE ENCOUNTER
Jana has been dealing with a thrush problem with her mouth this last month.  She states that the mouth is better but she thinks she has it in her stoma.  She can't seem to get rid of it.  Her stoma is bright red and very irritated.  No vaginal complaints.

## 2017-11-13 ENCOUNTER — TELEPHONE (OUTPATIENT)
Dept: INTERNAL MEDICINE | Facility: IMAGING CENTER | Age: 62
End: 2017-11-13

## 2017-11-13 DIAGNOSIS — R11.0 NAUSEA: ICD-10-CM

## 2017-11-13 RX ORDER — GRANISETRON HYDROCHLORIDE 1 MG/1
1 TABLET, FILM COATED ORAL EVERY 12 HOURS PRN
Qty: 30 TAB | Refills: 1 | Status: SHIPPED | OUTPATIENT
Start: 2017-11-13 | End: 2019-06-01

## 2017-11-17 NOTE — TELEPHONE ENCOUNTER
I did speak with Dr. Oropeza. He agrees with individual counseling and psychiatry referral for Jana.  I will discuss this with her.  He is also concerned about her medications and whether they may be causing some of her irritability and anger.  He has even suggested a referral to [facility] to assist her getting off her opioids. Recommend Dr. Nadeen Rizvi.

## 2017-11-21 ENCOUNTER — OFFICE VISIT (OUTPATIENT)
Dept: INTERNAL MEDICINE | Facility: IMAGING CENTER | Age: 62
End: 2017-11-21
Payer: MEDICARE

## 2017-11-21 ENCOUNTER — APPOINTMENT (RX ONLY)
Dept: URBAN - METROPOLITAN AREA CLINIC 20 | Facility: CLINIC | Age: 62
Setting detail: DERMATOLOGY
End: 2017-11-21

## 2017-11-21 VITALS
WEIGHT: 158 LBS | SYSTOLIC BLOOD PRESSURE: 118 MMHG | BODY MASS INDEX: 21.4 KG/M2 | OXYGEN SATURATION: 98 % | HEART RATE: 84 BPM | RESPIRATION RATE: 14 BRPM | DIASTOLIC BLOOD PRESSURE: 74 MMHG | HEIGHT: 72 IN | TEMPERATURE: 98.4 F

## 2017-11-21 DIAGNOSIS — R09.89 FEMORAL BRUIT: ICD-10-CM

## 2017-11-21 DIAGNOSIS — D485 NEOPLASM OF UNCERTAIN BEHAVIOR OF SKIN: ICD-10-CM

## 2017-11-21 DIAGNOSIS — I48.0 PAROXYSMAL ATRIAL FIBRILLATION (HCC): ICD-10-CM

## 2017-11-21 DIAGNOSIS — L72.8 OTHER FOLLICULAR CYSTS OF THE SKIN AND SUBCUTANEOUS TISSUE: ICD-10-CM

## 2017-11-21 PROBLEM — D48.5 NEOPLASM OF UNCERTAIN BEHAVIOR OF SKIN: Status: ACTIVE | Noted: 2017-11-21

## 2017-11-21 PROCEDURE — ? CURETTAGE AND DESTRUCTION

## 2017-11-21 PROCEDURE — ? ADDITIONAL NOTES

## 2017-11-21 PROCEDURE — 99212 OFFICE O/P EST SF 10 MIN: CPT | Mod: 25

## 2017-11-21 PROCEDURE — 99213 OFFICE O/P EST LOW 20 MIN: CPT | Performed by: FAMILY MEDICINE

## 2017-11-21 PROCEDURE — 17000 DESTRUCT PREMALG LESION: CPT

## 2017-11-21 ASSESSMENT — LOCATION ZONE DERM: LOCATION ZONE: LEG

## 2017-11-21 ASSESSMENT — LOCATION SIMPLE DESCRIPTION DERM: LOCATION SIMPLE: LEFT THIGH

## 2017-11-21 ASSESSMENT — LOCATION DETAILED DESCRIPTION DERM: LOCATION DETAILED: LEFT ANTERIOR DISTAL THIGH

## 2017-11-21 NOTE — PROCEDURE: CURETTAGE AND DESTRUCTION
Size Of Lesion In Cm: 1.1
Cautery Type: electrodesiccation
Consent was obtained from the patient. The risks, benefits and alternatives to therapy were discussed in detail. Specifically, the risks of infection, scarring, bleeding, prolonged wound healing, nerve injury, incomplete removal, allergy to anesthesia and recurrence were addressed. Alternatives to ED&C, such as: surgical removal and XRT were also discussed.  Prior to the procedure, the treatment site was clearly identified and confirmed by the patient. All components of Universal Protocol/PAUSE Rule completed.
Anesthesia Type: 1% lidocaine with 1:100,000 epinephrine and 408mcg clindamycin/ml and a 1:10 solution of 8.4% sodium bicarbonate
Size Of Lesion After Curettage: 1.3
Bill For Surgical Tray: no
Additional Information: (Optional): The wound was cleaned, and a pressure dressing was applied.  The patient received detailed post-op instructions.
What Was Performed First?: Curettage
Detail Level: Detailed
Number Of Curettages: 3
Post-Care Instructions: I reviewed with the patient in detail post-care instructions. Patient is to keep the area dry for 48 hours, and not to engage in any swimming until the area is healed. Should the patient develop any fevers, chills, bleeding, severe pain patient will contact the office immediately.

## 2017-11-26 NOTE — PROGRESS NOTES
"Chief Complaint   Patient presents with   • Follow-Up     requesting referrals       HISTORY OF PRESENT ILLNESS: Patient is a 62 y.o. female established patient who presents today requesting referrals to Dr. Cevallos and a new cardiologist.     She has a hx of paroxysmal afib and was seeing Dr. Russo, who has now retired. She does get an occasional episode of palptations or tachycardia and will then take a dose of metoprolol. She does not take it regular. She is having \"episodes\" less than once a month.     She also recently saw Dr. Mahan, GI. She heard femoral bruits and recommended Jana get back with Dr. Cevallos. She has seen her before. She does complain of bilat leg pain and intermittent swelling. Has had DVT in past.       Patient Active Problem List    Diagnosis Date Noted   • DDD (degenerative disc disease), lumbar 04/12/2017   • History of DVT (deep vein thrombosis) 11/23/2016   • Hypercalcemia 03/26/2015   • Paroxysmal atrial fibrillation (CMS-HCC) 03/26/2015   • Sleep apnea 10/26/2014   • Postherpetic neuralgia 06/24/2014   • Multiple falls 06/24/2014   • COPD (chronic obstructive pulmonary disease) (CMS-HCC) 06/24/2014   • Rosacea 06/24/2014   • Hypokalemia 06/24/2014   • Ileostomy in place (CMS-HCC) 06/26/2013   • History of cardiac murmur    • GERD (gastroesophageal reflux disease) 12/05/2012   • Status post lumbar surgery 07/16/2012   • Crohn's disease (CMS-HCC) 09/23/2009   • Chronic pain 09/23/2009     Current Outpatient Prescriptions on File Prior to Visit   Medication Sig Dispense Refill   • granisetron (KYTRIL) 1 MG Tab Take 1 Tab by mouth every 12 hours as needed for Nausea/Vomiting. 30 Tab 1   • diazepam (VALIUM) 5 MG Tab TAKE 1/2 TO 1 TABLET BY MOUTH EVERY 6 HOURS AS NEEDED FOR MUSCLE SPASM 120 Tab 0   • ondansetron (ZOFRAN ODT) 4 MG TABLET DISPERSIBLE Take 2 Tabs by mouth every 8 hours as needed. 30 Tab 3   • potassium chloride (MICRO-K) 10 MEQ capsule Take 2 Caps by mouth 1 time daily as " "needed. WITH LASIX ONLY 60 Cap 2   • nystatin (MYCOSTATIN) 034729 UNIT/ML Suspension Take 5 mL by mouth 4 times a day. 120 mL 1   • ipratropium-albuterol (DUONEB) 0.5-2.5 (3) MG/3ML nebulizer solution 3 mL by Nebulization route 4 times a day. 75 mL 2   • cyanocobalamin (VITAMIN B-12) 1000 MCG/ML Solution INJECT 1 ML IN THE MUSCLE EVERY 30 DAYS 10 mL 1   • albuterol 108 (90 BASE) MCG/ACT Aero Soln inhalation aerosol Inhale 2 Puffs by mouth every 6 hours as needed for Shortness of Breath. 1 Inhaler 1   • acyclovir (ZOVIRAX) 200 MG/5ML Suspension SHAKE LQ AND TK 10 ML PO BID FOR 7 DAYS  0   • spironolactone (ALDACTONE) 25 MG Tab Take 1 Tab by mouth 2 times a day. 60 Tab 3   • vitamin D, Ergocalciferol, (DRISDOL) 30353 UNITS Cap capsule Take 1 Cap by mouth every 7 days. 4 Cap 11   • metoprolol (LOPRESSOR) 25 MG Tab Take 1 Tab by mouth 3 times a day as needed. For palpitations 60 Tab 1   • torsemide (DEMADEX) 10 MG tablet Take 1 Tab by mouth every day. Prn swelling 30 Tab 3   • guaifenesin-codeine (TUSSI-ORGANIDIN NR) 100-10 MG/5ML syrup Take 5 mL by mouth 3 times a day as needed for Cough. 120 mL 0   • promethazine-codeine (PHENERGAN-CODEINE) 6.25-10 MG/5ML Syrup Take 5 mL by mouth 4 times a day as needed for Cough. 120 mL 0   • fluticasone (FLONASE) 50 MCG/ACT nasal spray Spray 1-2 Sprays in nose 1 time daily as needed. Indications: Hayfever 1 Bottle 3   • ipratropium (ATROVENT) 0.03 % Solution Spray 2 Sprays in nose 2 times a day. 1 Bottle 2   • zolpidem (AMBIEN) 10 MG Tab TAKE 1 TABLET BY MOUTH AT BEDTIME AS NEEDED FOR SLEEP 30 Tab 0   • Sulfacetamide Sodium-Sulfur 10-1 % Emulsion Use as directed once daily. 1 Tube 1   • scopolamine (TRANSDERM-SCOP) 1.5 MG/3DAYS PATCH 72 HR Apply 1 Patch to skin as directed every 72 hours. 4 Patch 1   • estradiol (ESTRACE) 0.1 MG/GM vaginal cream Insert 0.5g vaginally three times each week. 1 Tube 6   • BD INTEGRA SYRINGE 25G X 1\" 3 ML Misc USE 1 SYRINGE AS DIRECTED EVERY 30 DAYS 11 " "Each 0   • lidocaine (LIDODERM) 5 % PTCH   1   • furosemide (LASIX) 20 MG TABS Take 20 mg by mouth 1 time daily as needed. Indications: Edema     • Probiotic Product (PROBIOTIC DAILY) CAPS Take 1 Cap by mouth every day.     • cyanocobalamin (VITAMIN B-12) 1000 MCG/ML SOLN 1 mL by Intramuscular route every 30 days. 1 mL 0   • cetirizine (ZYRTEC) 10 MG TABS Take 10 mg by mouth 1 time daily as needed. For allergies     • oxycodone 20 MG/ML CONC Take 30 mg by mouth every four hours as needed. 20mg/ml solution       Current Facility-Administered Medications on File Prior to Visit   Medication Dose Route Frequency Provider Last Rate Last Dose   • cyanocobalamin (VITAMIN B-12) injection 1,000 mcg  1,000 mcg Intramuscular Q30 DAYS Amanda Bazzi M.D.   1,000 mcg at 05/03/17 1452         Past medical, surgical, family, and social history is reviewed and updated in Epic chart by me today.   Medications and allergies reviewed and updated in Epic chart by me today.     REVIEW OF SYSTEMS:  GENERAL: chronic fatigue, no weight loss.  HEENT:  Ears--ongoing ear issues. Recent tubes with Dr. Borges                 Eyes--no blurred vision, no discharge or pain                 Throat--No sore throat, no dysphagia, no hoarseness  CV:  No chest pain,dyspnea, or edema. Occasional palpitations.  RESP:  No sob,cough,wheezing or hemoptysis.  GI: she has a colostomy. Chronic nausea.  :  No dysuria, polyuria, hematuria, incontinence, or nocturia.  MS:  Chronic neck, back and joint pain.  NEURO:  No seizures, syncope, paralysis, tremor, or weakness.  SKIN: No new or concerning skin lesions or changes.   PSYCH: Mood fine.    Vitals:    11/21/17 1000   BP: 118/74   Pulse: 84   Resp: 14   Temp: 36.9 °C (98.4 °F)   SpO2: 98%   Weight: 71.7 kg (158 lb)   Height: 1.84 m (6' 0.44\")     Physical Exam:  Gen: Well developed, well nourished. No acute distress.  Neck:  Supple, no adenopathy or thyromegaly.  Heart:  Regular rate and rhythm.  Normal S1, " S2. No murmur, gallop or rub.  Lungs:  Clear, No wheezes,rales or rhonchi.  Abdomen: Soft, , nondistended. Normal bowel sounds. No hepatosplenomegaly or masses, or hernias. No rebound or guarding. Mild tenderness. Colostomy.  bilat femoral bruits.  Extremities:  No edema. Negative Jessica's bilat.   Psych: Mood and affect are appropriate.        Assessment/Plan:  1. Femoral bruit  REFERRAL TO VASCULAR SURGERY   2. Paroxysmal atrial fibrillation (CMS-HCC)  REFERRAL TO CARDIOLOGY   referrals as ordered.  Follow up 2-3 months.

## 2017-12-01 ENCOUNTER — OFFICE VISIT (OUTPATIENT)
Dept: BEHAVIORAL HEALTH | Facility: PHYSICIAN GROUP | Age: 62
End: 2017-12-01
Payer: MEDICARE

## 2017-12-01 DIAGNOSIS — G89.4 CHRONIC PAIN SYNDROME: ICD-10-CM

## 2017-12-01 DIAGNOSIS — F06.8 ANXIETY DISORDER DUE TO MULTIPLE MEDICAL PROBLEMS: ICD-10-CM

## 2017-12-01 PROCEDURE — 90791 PSYCH DIAGNOSTIC EVALUATION: CPT | Performed by: PSYCHOLOGIST

## 2017-12-01 RX ORDER — BRIMONIDINE 5 MG/G
GEL TOPICAL
COMMUNITY
End: 2017-12-29

## 2017-12-01 NOTE — BH THERAPY
[facility]  INITIAL ASSESSMENT    Name: Jana Overton  MRN: [id number]  : 1955  Age: 62 y.o.  Date of assessment: 2017  PCP: Amanda Bazzi M.D.  Persons in attendance: Patient  Total session time: 60 minutes      CHIEF COMPLAINT AND HISTORY OF PRESENTING PROBLEM:  (as stated by Patient):  Jana Overton is a 62 y.o.,  White female referred for assessment by Amanda Bazzi M.D..  She reports that she was referred for individual therapy due to anxiety. Client reports that she has experienced numerous medical issues beginning at age 12. She has had 21 operations to date and experiences multiple lasting effects of her medical issues including having her colon removed and having an ileostomy in place. Client states that she experiences some anxiety when she feels her heart rate increase due to having atrial fibrillation but she states that her main source of anxiety is when she has to go to the emergency room. She denies that this anxiety is pervasive or prevents her from activities of daily living.   Chief Complaint   Patient presents with   • Initial  Evaluation       FAMILY/SOCIAL HISTORY  Current living situation/household members: Client lives with her   Relevant family history/structure/dynamics: Client was born in [place] and was raised by both parents. She has one younger brother, an older brother and two older sisters. Client reports that both of her parents were in the  and her father worked in  intelligence. The family moved around a lot and client was raised in multiple different places. She describes her childhood as good and denies any history of abuse. Client's parents are both . All of her siblings are living and she reports that she has good relationships will all of them. Client has been  to her  for 40 years and reports that they have a good relationship. They do not have any children but they did raise a foster  son on and off for approximately 6 years 30 years ago.   Current family/social stressors: Client's medical issues  Quality/quantity of current family and/or social support: Good, client reports that she has her  and some close friends in her support system  Does patient/parent report a family history of behavioral health issues, diagnoses, or treatment? No  Family History   Problem Relation Age of Onset   • Lung Disease Mother      copd   • Diabetes Father    • Diabetes Sister    • Cancer Maternal Aunt      breast        BEHAVIORAL HEALTH TREATMENT HISTORY  Does patient/parent report a history of prior behavioral health treatment for patient? Yes:    Dates Level of Care Facilty/Provider Diagnosis/Problem Medications   [id number] (on and off) OP marriage therapy Dr. Milner N/A None   2007 OP individual therapy Unknown Chronic medical issues None                                                                   History of untreated behavioral health issues identified? No    MEDICAL HISTORY  Primary care behavioral health screenings: Patient Health Questionaire                                     If depressive symptoms identified deferred to follow up visit unless specifically addressed in assesment and plan.    Interpretation of PHQ-9 Total Score   Score Severity   1-4 No Depression   5-9 Mild Depression   10-14 Moderate Depression   15-19 Moderately Severe Depression   20-27 Severe Depression       Past medical/surgical history:   Past Medical History:   Diagnosis Date   • Anesthesia     difficult Iv stick   • Anxiety    • Arthritis     all over   • ASTHMA    • Atrial fib/flut    • Backpain    • Breath shortness    • Bronchitis     5 years   • Chronic pain    • Colostomy in place (CMS-HCC) 10/14/2010   • COPD (chronic obstructive pulmonary disease) (CMS-HCC) 6/24/2014   • COPD (chronic obstructive pulmonary disease) (CMS-HCC) 6/24/2014   • Crohn's disease of colon (CMS-HCC)    • Dyspnea    • History of cardiac  murmur    • Hypokalemia 6/24/2014   • Indigestion    • Infectious disease     MRSA, VancoRSA   • Multiple falls 6/24/2014   • Narcotic dependence (CMS-[facility]) 9/23/2009   • Obstruction    • Other specified disorder of intestines     crohns disease   • Pain 7/11/13    all over   • Pneumonia     child and mid 30's   • Postherpetic neuralgia 6/24/2014   • Rosacea 6/24/2014   • Sleep apnea       Past Surgical History:   Procedure Laterality Date   • ILEOSTOMY  5/14/2014    Performed by Kevin Cruz M.D. at SURGERY [facility] ORS   • MAMMOPLASTY REDUCTION  7/17/2013    Performed by Janey Burt M.D. at SURGERY [facility]   • HARDWARE REMOVAL NEURO  7/16/2012    Performed by KEELEY KIM at SURGERY [facility] ORS   • GASTROSCOPY  10/4/2011    Performed by TYSON MARTINEZ at SURGERY SAME DAY [facility] ORS   • COLONOSCOPY  10/4/2011    Performed by TYSON MARTINEZ at SURGERY SAME DAY [facility] ORS   • DILATION AND CURETTAGE  9/24/2010    Performed by GAURAV ALY at SURGERY SAME DAY [facility] ORS   • ILEOSTOMY  11/11/2009    Performed by KEVIN CRUZ at SURGERY [facility] ORS   • GASTROSCOPY WITH BIOPSY  11/22/08    Performed by NEGRITA HUYNH at SURGERY [facility]   • ABDOMINAL EXPLORATION     • CERVICAL DISK AND FUSION ANTERIOR     • COLON RESECTION     • FOOT SURGERY     • HAND SURGERY     • LUMBAR FUSION ANTERIOR     • LUMPECTOMY     • OTHER ABDOMINAL SURGERY      surgery for chrons disease   • PB REDUCTION OF LARGE BREAST          Medication Allergies:  Morphine; Methotrexate; Roxanol [morphine sulfate]; Tape; Ativan [lorazepam]; Azathioprine sodium; Celestone [betamethasone sodium phosphate]; Demerol; Elavil [amitriptyline]; Fentanyl; Kdc:fentanyl; Lyrica [pregabalin]; Methadone; Methotrexate derivatives; Pseudoephedrine; Remicade [infliximab injection]; Sulfa drugs; Sulfasalazine; and Tizanidine hydrochloride   Medical history provided by patient during current evaluation: Client  reports an extensive medical history that includes back and gastrointestinal issues which began in her childhood. She has had 21 operations including 4 bowel surgeries, back and neck fusions, surgeries on her feet and hands, vaginal surgery as a teen, breast reduction and tubes put in her ears 2 months ago. She has had her colon and rectum removed and current has an ileostomy in place. She has been diagnosed with Crohn's disease, Lyme's disease, and fibromyalgia.      Patient reports last physical exam: 2017   Does patient/parent report any history of or current developmental concerns? No  Does patient/parent report nutritional concerns? Yes  Does patient/parent report change in appetite or weight loss/gain? No  Does patient/parent report history of eating disorder symptoms? No  Does patient/parent report dental problem? No  Does patient/parent report physical pain? Yes   Indicate if pain is acute or chronic, and location: Chronic abdominal pain due to narrowing of ileostomy, chronic back pain   Pain scale rating:       Does patient/parent report functional impact of medical, developmental, or pain issues?   yes    EDUCATIONAL/LEARNING HISTORY  Is patient currently enrolled in a school/educational program?   No:   Highest grade level completed: Associates degree  School performance/functioning: Good  History of Special Education/repeated grades/learning issues: no  Preferred learning style: N/A  Current learning needs (large print, language barrier, etc):  None      EMPLOYMENT/RESOURCES  Is the patient currently employed? No  Does the patient/parent report adequate financial resources? Yes  Does patient identify impact of presenting issue on work functioning? No  Work or income-related stressors:  None     HISTORY:  Does patient report current or past enlistment? No    [If yes, complete below items]    SPIRITUAL/CULTURAL/IDENTITY:  What are the patient’s/family’s spiritual beliefs or practices?  [place]  What is the patient’s cultural or ethnic background/identity?   How does the patient identify their sexual orientation? Heterosexual  How does the patient identify their gender? Female  Does the patient identify any spiritual/cultural/identity factors as relevant to the presenting issue? No    LEGAL HISTORY  Has the patient ever been involved with juvenile, adult, or family legal systems? No   [If yes, trigger section below:]  Does patient report ever being a victim of a crime?  No  Does patient report involvement in any current legal issues?  No  Does patient report ever being arrested or committing a crime? No  Does patient report any current agency (parole/probation/CPS/) involvement? No    ABUSE/NEGLECT/TRAUMA SCREENING  Does patient report feeling “unsafe” in his/her home, or afraid of anyone? No  Does patient report any history of physical, sexual, or emotional abuse? No  Does parent or significant other report any of the above? N/a  Is there evidence of neglect by self? No  Is there evidence of neglect by a caregiver? No  Does the patient/parent report any history of CPS/APS/police involvement related to suspected abuse/neglect or domestic violence? No  Does the patient/parent report any other history of potentially traumatic life events? No  Based on the information provided during the current assessment, is a mandated report of suspected abuse/neglect being made?  No     SAFETY ASSESSMENT - SELF  Does patient acknowledge current or past symptoms of dangerousness to self? No  Does parent/significant other report patient has current or past symptoms of dangerousness to self? No      Recent change in frequency/specificity/intensity of suicidal thoughts or self-harm behavior? N/A  Current access to firearms, medications, or other identified means of suicide/self-harm? N/A  If yes, willing to restrict access to means of suicide/self-harm? N/A  Protective factors present: No  history of suicidal ideation    Current Suicide Risk: Low  Crisis Safety Plan completed and copy given to patient: N/a    SAFETY ASSESSMENT - OTHERS  Does paor past symptoms of aggressive behavior or risk to others? No  Does parent/significant othtient acknowledge current or past symptoms of aggressive behavior or risk to others? N/A  Does parent/significant other report patient has current or past symptoms of aggressive behavior or risk to others? N/A    Recent change in frequency/specificity/intensity of thoughts or threats to harm others? N/A  Current access to firearms/other identified means of harm? N/A  If yes, willing to restrict access to weapons/means of harm? N/A  Protective factors present: No history of aggression    Current Homicide Risk:  Low  Crisis Safety Plan completed and copy given to patient? No  Based on information provided during the current assessment, is a mandated “duty to warn” being exercised? No    SUBSTANCE USE/ADDICTION HISTORY  [] Not applicable - patient 10 years of age or younger    Is there a family history of substance use/addiction? No  Does patient acknowledge or parent/significant other report use of/dependence on substances? No  Last time patient used alcohol: N/A  Within the past week? No  Last time patient used marijuana: N/A  Within the past month? Client uses CBD oil for nausea  Any other street drugs ever tried even once? No  Any use of prescription medications/pills without a prescription, or for reasons others than originally prescribed?  No  Any other addictive behavior reported (gambling, shopping, sex)? No     Drug History:  Amphetamine:Amphetamine frequency: Never used      Cannibis:  Cannabis frequency: Never used      Cocaine:  Cocaine frequency: Never used      Ecstasy:  Ecstasy frequency: Never used      Hallucinogen:  Hallucinogen frequency: Never used      Inhalant:   Inhalant frequency: Never used      Opiate:  Opiate frequency: Never used  Cannabis  frequency: Never used      Other:  Other drug frequency: Never used      Sedative:   Sedative frequency: Never used          What consequences does the patient associate with any of the above substance use and or addictive behaviors? None    Patient’s motivation/readiness for change: N/A    [x] Patient denies use of any substance/addictive behaviors    STRENGTHS/ASSETS  Strengths Identified by interviewer: Insight into problems, Evidence of good judgement, Self-awareness, Family suppport, Effeectively addressed past stressors/challenges and History of effective treatment  Strengths Identified by patient: Perfectionism (sometimes), athletic and a people person    MENTAL STATUS/OBSERVATIONS   Participation: Active verbal participation  Grooming: Neat  Orientation:Fully Oriented   Behavior: Calm  Eye contact: Good   Mood:Euthymic  Affect:Constricted  Thought process: Logical and Goal-directed  Thought content:  Within normal limits  Speech: Rate within normal limits and Volume within normal limits  Perception: Within normal limits  Memory: No gross evidence of memory deficits  Insight: Good  Judgment:  Good  Other:    Family/couple interaction observations: N/A    RESULTS OF SCREENING MEASURES:  [] Not applicable  Measure:   Score:     Measure:   Score:       CLINICAL FORMULATION: Client presents with multiple chronic medical issues and chronic pain. She reports some minor anxiety with regard to her health and situational anxiety when she has to go to the emergency room. She denies any significant mood or anxiety symptoms but does reports trouble sleeping and irritability when she is in pain.       DIAGNOSTIC IMPRESSION(S):  1. Anxiety disorder due to multiple medical problems    2. Chronic pain syndrome          IDENTIFIED NEEDS/PLAN:  [If any of these marked, trigger DISPOSITION list]  Mood/anxiety  Refer to [facility]: Outpatient Therapy    Does patient express agreement with the above plan? Yes      Referral appointment(s) scheduled? Yes       Fozia Clarke, Ph.D.

## 2017-12-04 ENCOUNTER — TELEPHONE (OUTPATIENT)
Dept: INTERNAL MEDICINE | Facility: IMAGING CENTER | Age: 62
End: 2017-12-04

## 2017-12-04 DIAGNOSIS — G47.30 SLEEP APNEA, UNSPECIFIED TYPE: ICD-10-CM

## 2017-12-04 NOTE — TELEPHONE ENCOUNTER
----- Message from Dianne Sandoval R.N. sent at 12/4/2017  2:23 PM PST -----  Jana would like a referral to Pulmonary-Dr. Tinoco to do a new sleep study because last time the mask would not fit her for a good seal and she heard that there was new technology.    Thanks.

## 2017-12-06 ENCOUNTER — OFFICE VISIT (OUTPATIENT)
Dept: INTERNAL MEDICINE | Facility: IMAGING CENTER | Age: 62
End: 2017-12-06
Payer: MEDICARE

## 2017-12-06 VITALS
RESPIRATION RATE: 14 BRPM | OXYGEN SATURATION: 99 % | TEMPERATURE: 98.5 F | SYSTOLIC BLOOD PRESSURE: 145 MMHG | DIASTOLIC BLOOD PRESSURE: 70 MMHG | HEART RATE: 82 BPM | BODY MASS INDEX: 21.4 KG/M2 | HEIGHT: 72 IN | WEIGHT: 158 LBS

## 2017-12-06 DIAGNOSIS — K92.9 DISORDER OF STOMA: ICD-10-CM

## 2017-12-06 DIAGNOSIS — J40 BRONCHITIS: ICD-10-CM

## 2017-12-06 PROCEDURE — 99214 OFFICE O/P EST MOD 30 MIN: CPT | Performed by: FAMILY MEDICINE

## 2017-12-06 RX ORDER — AZITHROMYCIN 250 MG/1
TABLET, FILM COATED ORAL
Qty: 6 TAB | Refills: 0 | Status: SHIPPED | OUTPATIENT
Start: 2017-12-06 | End: 2017-12-26

## 2017-12-06 RX ORDER — BENZONATATE 200 MG/1
200 CAPSULE ORAL 3 TIMES DAILY PRN
Qty: 30 CAP | Refills: 0 | Status: SHIPPED | OUTPATIENT
Start: 2017-12-06 | End: 2017-12-26

## 2017-12-08 NOTE — PROGRESS NOTES
Chief Complaint   Patient presents with   • Cough       HISTORY OF PRESENT ILLNESS: Patient is a 62 y.o. female established patient who presents today complaining of 5 days of URI sx. She started with sore throat and ear fullness---had tubes in ears just recently--no drainage.  Sore throat. Cough and chest congestion.no sob. Tired. Drinking fairly well.   No fevers.      Also would like referral to Dr. Cruz for stomal evaluation. She thinks her stoma is getting tight and causing her pain.   She saw Dr. Mahan who then recommended she see Dr. Cruz.      Patient Active Problem List    Diagnosis Date Noted   • Anxiety disorder due to multiple medical problems 12/01/2017   • DDD (degenerative disc disease), lumbar 04/12/2017   • History of DVT (deep vein thrombosis) 11/23/2016   • Hypercalcemia 03/26/2015   • Paroxysmal atrial fibrillation (CMS-HCC) 03/26/2015   • Sleep apnea 10/26/2014   • Postherpetic neuralgia 06/24/2014   • Multiple falls 06/24/2014   • COPD (chronic obstructive pulmonary disease) (CMS-HCC) 06/24/2014   • Rosacea 06/24/2014   • Hypokalemia 06/24/2014   • Ileostomy in place (CMS-HCC) 06/26/2013   • History of cardiac murmur    • GERD (gastroesophageal reflux disease) 12/05/2012   • Status post lumbar surgery 07/16/2012   • Crohn's disease (CMS-HCC) 09/23/2009   • Chronic pain 09/23/2009     Current Outpatient Prescriptions on File Prior to Visit   Medication Sig Dispense Refill   • Brimonidine Tartrate (MIRVASO) 0.33 % Gel by Apply externally route.     • granisetron (KYTRIL) 1 MG Tab Take 1 Tab by mouth every 12 hours as needed for Nausea/Vomiting. 30 Tab 1   • diazepam (VALIUM) 5 MG Tab TAKE 1/2 TO 1 TABLET BY MOUTH EVERY 6 HOURS AS NEEDED FOR MUSCLE SPASM 120 Tab 0   • ondansetron (ZOFRAN ODT) 4 MG TABLET DISPERSIBLE Take 2 Tabs by mouth every 8 hours as needed. 30 Tab 3   • potassium chloride (MICRO-K) 10 MEQ capsule Take 2 Caps by mouth 1 time daily as needed. WITH LASIX ONLY 60 Cap 2   •  "nystatin (MYCOSTATIN) 514494 UNIT/ML Suspension Take 5 mL by mouth 4 times a day. 120 mL 1   • ipratropium-albuterol (DUONEB) 0.5-2.5 (3) MG/3ML nebulizer solution 3 mL by Nebulization route 4 times a day. 75 mL 2   • cyanocobalamin (VITAMIN B-12) 1000 MCG/ML Solution INJECT 1 ML IN THE MUSCLE EVERY 30 DAYS 10 mL 1   • albuterol 108 (90 BASE) MCG/ACT Aero Soln inhalation aerosol Inhale 2 Puffs by mouth every 6 hours as needed for Shortness of Breath. 1 Inhaler 1   • acyclovir (ZOVIRAX) 200 MG/5ML Suspension SHAKE LQ AND TK 10 ML PO BID FOR 7 DAYS  0   • spironolactone (ALDACTONE) 25 MG Tab Take 1 Tab by mouth 2 times a day. 60 Tab 3   • vitamin D, Ergocalciferol, (DRISDOL) 99060 UNITS Cap capsule Take 1 Cap by mouth every 7 days. 4 Cap 11   • metoprolol (LOPRESSOR) 25 MG Tab Take 1 Tab by mouth 3 times a day as needed. For palpitations 60 Tab 1   • torsemide (DEMADEX) 10 MG tablet Take 1 Tab by mouth every day. Prn swelling 30 Tab 3   • guaifenesin-codeine (TUSSI-ORGANIDIN NR) 100-10 MG/5ML syrup Take 5 mL by mouth 3 times a day as needed for Cough. 120 mL 0   • promethazine-codeine (PHENERGAN-CODEINE) 6.25-10 MG/5ML Syrup Take 5 mL by mouth 4 times a day as needed for Cough. 120 mL 0   • fluticasone (FLONASE) 50 MCG/ACT nasal spray Spray 1-2 Sprays in nose 1 time daily as needed. Indications: Hayfever 1 Bottle 3   • ipratropium (ATROVENT) 0.03 % Solution Spray 2 Sprays in nose 2 times a day. 1 Bottle 2   • zolpidem (AMBIEN) 10 MG Tab TAKE 1 TABLET BY MOUTH AT BEDTIME AS NEEDED FOR SLEEP 30 Tab 0   • Sulfacetamide Sodium-Sulfur 10-1 % Emulsion Use as directed once daily. 1 Tube 1   • scopolamine (TRANSDERM-SCOP) 1.5 MG/3DAYS PATCH 72 HR Apply 1 Patch to skin as directed every 72 hours. 4 Patch 1   • estradiol (ESTRACE) 0.1 MG/GM vaginal cream Insert 0.5g vaginally three times each week. 1 Tube 6   • BD INTEGRA SYRINGE 25G X 1\" 3 ML Misc USE 1 SYRINGE AS DIRECTED EVERY 30 DAYS 11 Each 0   • lidocaine (LIDODERM) 5 % " "PTCH   1   • furosemide (LASIX) 20 MG TABS Take 20 mg by mouth 1 time daily as needed. Indications: Edema     • Probiotic Product (PROBIOTIC DAILY) CAPS Take 1 Cap by mouth every day.     • cyanocobalamin (VITAMIN B-12) 1000 MCG/ML SOLN 1 mL by Intramuscular route every 30 days. 1 mL 0   • cetirizine (ZYRTEC) 10 MG TABS Take 10 mg by mouth 1 time daily as needed. For allergies     • oxycodone 20 MG/ML CONC Take 30 mg by mouth every four hours as needed. 20mg/ml solution       Current Facility-Administered Medications on File Prior to Visit   Medication Dose Route Frequency Provider Last Rate Last Dose   • cyanocobalamin (VITAMIN B-12) injection 1,000 mcg  1,000 mcg Intramuscular Q30 DAYS Amanda Bazzi M.D.   1,000 mcg at 05/03/17 1452         Past medical, surgical, family, and social history is reviewed and updated in Epic chart by me today.   Medications and allergies reviewed and updated in Epic chart by me today.     REVIEW OF SYSTEMS:  GENERAL: increased fatigue, no weight loss.  HEENT:  Ears--no earache, no change in hearing, no dizziness, no tinnitus.ears feel full.                  Eyes--no blurred vision, no discharge or pain                 Throat--moderate sore throat, no dysphagia, no hoarseness  CV:  No chest pain,dyspnea,palpitations or edema.  RESP: cough and chest congestion.  No hemoptysis or wheezing.   GI: stoma concerns as above. Stools normal for her.  :  No dysuria, polyuria, hematuria, incontinence, or nocturia.  MS: chronic neck /back pain  NEURO:  No seizures, syncope, paralysis, tremor, or weakness.  SKIN: No new or concerning skin lesions or changes.   PSYCH: Mood fine.    Vitals:    12/06/17 1500   BP: 145/70   Pulse: 82   Resp: 14   Temp: 36.9 °C (98.5 °F)   SpO2: 99%   Weight: 71.7 kg (158 lb)   Height: 1.84 m (6' 0.44\")     Physical Exam:  GENERAL;  WD/WN, No acute distress. Walking with cane  HEENT:  PERRLA, EOMI, no conjuctival injection, no icterus.                 TM's normal " bilat.--PE tubes in place.                  Nasal mucosa erythematous and edematous.                 Pharynx injected. No exudate.  NECK: Supple with no adenopathy or thyromegaly.  LUNGS: Clear with no rales, rhonchi, or wheezes.  COR: RR with no murmur, gallop or rub.  EXT: No edema.        Assessment/Plan:  1. Bronchitis . Zpack, and tessalon for cough. Fluids and rest. Call for increased sx.     2. Disorder of stoma . Refer to Dr. Cruz REFERRAL TO GENERAL SURGERY

## 2017-12-09 DIAGNOSIS — J40 BRONCHITIS: ICD-10-CM

## 2017-12-09 DIAGNOSIS — I48.0 PAROXYSMAL ATRIAL FIBRILLATION (HCC): ICD-10-CM

## 2017-12-09 DIAGNOSIS — R60.9 EDEMA, UNSPECIFIED TYPE: ICD-10-CM

## 2017-12-09 RX ORDER — SPIRONOLACTONE 25 MG/1
TABLET ORAL
Qty: 60 TAB | Refills: 3 | Status: SHIPPED | OUTPATIENT
Start: 2017-12-09 | End: 2018-02-21 | Stop reason: SDUPTHER

## 2017-12-09 RX ORDER — CEFDINIR 300 MG/1
300 CAPSULE ORAL 2 TIMES DAILY
Qty: 20 CAP | Refills: 0 | Status: SHIPPED | OUTPATIENT
Start: 2017-12-09 | End: 2017-12-26

## 2017-12-09 NOTE — TELEPHONE ENCOUNTER
Jana called with fever and increased cough. Finishing zpack and does not feel much better.   No sob.   Using albuterol nebs bid.    REC:  May increase neb to q 6 hr. Increase fluids.            Omnicef. Call for increased sx.

## 2017-12-12 ENCOUNTER — TELEPHONE (OUTPATIENT)
Dept: CARDIOLOGY | Facility: MEDICAL CENTER | Age: 62
End: 2017-12-12

## 2017-12-12 NOTE — TELEPHONE ENCOUNTER
LVM for patient to call back and let me know if they have seen a cardiologist before or have had a recent EKG for her appointment with FELICITA on Monday 12/18.

## 2017-12-13 ENCOUNTER — NON-PROVIDER VISIT (OUTPATIENT)
Dept: INTERNAL MEDICINE | Facility: IMAGING CENTER | Age: 62
End: 2017-12-13
Payer: MEDICARE

## 2017-12-13 ENCOUNTER — OFFICE VISIT (OUTPATIENT)
Dept: BEHAVIORAL HEALTH | Facility: PHYSICIAN GROUP | Age: 62
End: 2017-12-13
Payer: MEDICARE

## 2017-12-13 DIAGNOSIS — F06.8 ANXIETY DISORDER DUE TO MULTIPLE MEDICAL PROBLEMS: ICD-10-CM

## 2017-12-13 DIAGNOSIS — G43.809 OTHER MIGRAINE WITHOUT STATUS MIGRAINOSUS, NOT INTRACTABLE: ICD-10-CM

## 2017-12-13 PROCEDURE — 96372 THER/PROPH/DIAG INJ SC/IM: CPT | Performed by: FAMILY MEDICINE

## 2017-12-13 PROCEDURE — 90834 PSYTX W PT 45 MINUTES: CPT | Performed by: PSYCHOLOGIST

## 2017-12-13 RX ORDER — KETOROLAC TROMETHAMINE 30 MG/ML
60 INJECTION, SOLUTION INTRAMUSCULAR; INTRAVENOUS ONCE
Status: COMPLETED | OUTPATIENT
Start: 2017-12-13 | End: 2017-12-13

## 2017-12-13 RX ADMIN — KETOROLAC TROMETHAMINE 60 MG: 30 INJECTION, SOLUTION INTRAMUSCULAR; INTRAVENOUS at 14:34

## 2017-12-13 NOTE — BH THERAPY
[facility]  Therapy Progress Note    Patient Name: Jana Overton  Patient MRN: [id number]  Today's Date: 12/13/2017     Type of session:Individual psychotherapy  Length of session: 60 minutes  Persons in attendance:Patient    Subjective/New Info: Client arrived on time for individual therapy appointment. She reports that she has bene very sick for the past two weeks and is experiencing a great deal of pain. Client states that her anxiety has also been very high. Writer and client practiced a relaxation meditation exercise until client felt more calm. Client reports that a close friend of hers passed away last week from influenza. Client discussed her friendship with this person as well as her frustrations surrounding some of the circumstances surrounding her death. Client also discussed feeling very bothered by a comment made by her couples therapist about client needing to go to rehab for drug addiction.     Objective/Observations:   Participation: Active verbal participation   Grooming: Casual   Cognition: Slightly impaired due to client's physical pain   Eye contact: Limited   Mood: Depressed   Affect: Full range   Thought process: Logical and Goal-directed   Speech: Rate within normal limits and Volume within normal limits   Other:     Diagnoses:   1. 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 Not Indicated   If evidence of imminent risk is present, intervention/plan:     Therapeutic Intervention(s): Establish rapport, Pain addressed, Relaxation exercise and Supportive psychotherapy    Treatment Goal(s)/Objective(s) addressed: Reduce client's symptoms of anxiety     Progress toward Treatment Goals: Moderate decline    Plan:  - Next appointment scheduled:  12/22/2017    Fozia Clarke, Ph.D.  12/13/2017

## 2017-12-14 ENCOUNTER — OFFICE VISIT (OUTPATIENT)
Dept: PULMONOLOGY | Facility: HOSPICE | Age: 62
End: 2017-12-14
Payer: MEDICARE

## 2017-12-14 VITALS
DIASTOLIC BLOOD PRESSURE: 84 MMHG | OXYGEN SATURATION: 98 % | SYSTOLIC BLOOD PRESSURE: 142 MMHG | HEART RATE: 100 BPM | HEIGHT: 72 IN | BODY MASS INDEX: 20.86 KG/M2 | RESPIRATION RATE: 14 BRPM | TEMPERATURE: 98.3 F | WEIGHT: 154 LBS

## 2017-12-14 DIAGNOSIS — G47.33 OSA (OBSTRUCTIVE SLEEP APNEA): ICD-10-CM

## 2017-12-14 PROCEDURE — 99203 OFFICE O/P NEW LOW 30 MIN: CPT | Performed by: INTERNAL MEDICINE

## 2017-12-14 RX ORDER — FLUCONAZOLE 150 MG/1
TABLET ORAL
Refills: 0 | COMMUNITY
Start: 2017-11-09 | End: 2017-12-29

## 2017-12-14 RX ORDER — AZELASTINE 1 MG/ML
SPRAY, METERED NASAL
Refills: 5 | COMMUNITY
Start: 2017-10-11 | End: 2017-12-29

## 2017-12-14 RX ORDER — ALBUTEROL SULFATE 90 UG/1
2 AEROSOL, METERED RESPIRATORY (INHALATION) EVERY 6 HOURS PRN
COMMUNITY
End: 2017-12-29

## 2017-12-14 RX ORDER — AZELASTINE HYDROCHLORIDE 0.5 MG/ML
SOLUTION/ DROPS OPHTHALMIC
Refills: 5 | COMMUNITY
Start: 2017-10-11 | End: 2017-12-29

## 2017-12-14 ASSESSMENT — ENCOUNTER SYMPTOMS
WHEEZING: 1
HEMOPTYSIS: 0
DYSPNEA AT REST: 1
CHEST TIGHTNESS: 1
SHORTNESS OF BREATH: 1
RESPIRATORY SYMPTOMS COMMENTS: YES

## 2017-12-14 NOTE — LETTER
Man Tinoco M.D.  [facility] Pulmonary Medicine   [address] [place] [address] [place]  [place] [address]  Phone: [phone] - Fax: [phone]           Encounter Date: 12/14/2017  Provider: Man Tinoco M.D.  Location of Care: [facility] PULMONARY MEDICINE      Patient:   Jana Overton   MR Number: [id number]   YOB: 1955     PROGRESS NOTE:  Chief Complaint   Patient presents with   • New Patient     Evaluation for TRUDY       HPI:  Patient is a 62-year-old woman who we saw several years ago. She does have a history of mild asthma. At the time we saw her she was also noted to have nocturnal oxygen desaturation and eventually had a sleep study showing evidence of complex sleep apnea. The patient had a respiratory disturbance index of 28 events per hour. She had a significant number of central events. Her events were associated with oxygen desaturation. A titration study showed successful titration at 7 cm of water pressure. However, she was never able to tolerate CPAP. A mask that properly fit was never found. She then used supplemental oxygen at night at 3 L/m. Recently she had a cognitive evaluation because of some memory issues. It has been suggested that she restart CPAP.  She does have significant medical problems secondary to her chronic Crohn's disease    Past Medical History:   Diagnosis Date   • Anesthesia     difficult Iv stick   • Anxiety    • Arthritis     all over   • ASTHMA    • Atrial fib/flut    • Backpain    • Breath shortness    • Bronchitis     5 years   • Chronic pain    • Colostomy in place (CMS-HCC) 10/14/2010   • COPD (chronic obstructive pulmonary disease) (CMS-HCC) 6/24/2014   • COPD (chronic obstructive pulmonary disease) (CMS-HCC) 6/24/2014   • Crohn's disease of colon (CMS-HCC)    • Dyspnea    • History of cardiac murmur    • Hypokalemia 6/24/2014   • Indigestion    • Infectious disease     MRSA, VancoRSA   • Multiple falls 6/24/2014   •  Narcotic dependence (CMS-[facility]) 9/23/2009   • Obstruction    • Other specified disorder of intestines     crohns disease   • Pain 7/11/13    all over   • Pneumonia     child and mid 30's   • Postherpetic neuralgia 6/24/2014   • Rosacea 6/24/2014   • Sleep apnea        ROS:  Constitutional: Denies fevers, chills, night sweats, fatigue or weight loss  Eyes: Denies vision loss, pain, drainage, double vision  Ears, Nose, Throat: Denies earache, tinnitus, hoarseness.She has had some dizziness from Ménière's disease.  Cardiovascular: Denies chest pain, tightness, palpitations  Respiratory: Denies  sputum production, cough, hemoptysis. She does have some occasional shortness of breath  Sleep: See history of present illness  GI: Crohn's disease with colostomy  : Denies frequent urination, hematuria, painful urination  Musculoskeletal: Denies back pain, painful joints, sore muscles  Neurological: Denies headaches, seizures  Skin: Denies rashes, color changes  Psychiatric: Denies depression or thoughts of suicide  Hematologic: Denies bleeding tendency or clotting tendency  Allergic/Immunologic: Denies rhinitis, skin sensitivity    Social History     Social History   • Marital status:      Spouse name: N/A   • Number of children: N/A   • Years of education: N/A     Occupational History   • Not on file.     Social History Main Topics   • Smoking status: Never Smoker   • Smokeless tobacco: Never Used   • Alcohol use No   • Drug use: No   • Sexual activity: Not on file      Comment:      Other Topics Concern   • Not on file     Social History Narrative   • No narrative on file     Morphine; Ativan [lorazepam]; Azathioprine sodium; Demerol; Elavil [amitriptyline]; Fentanyl; Kdc:fentanyl; Lyrica [pregabalin]; Methadone; Methotrexate; Methotrexate derivatives; Pseudoephedrine; Remicade [infliximab injection]; Roxanol [morphine sulfate]; Sulfa drugs; Sulfasalazine; Tape; Tizanidine hydrochloride; and Celestone  [betamethasone sodium phosphate]  Current Outpatient Prescriptions on File Prior to Visit   Medication Sig Dispense Refill   • metoprolol (LOPRESSOR) 25 MG Tab TAKE 1 TABLET BY MOUTH THREE TIMES DAILY AS NEEDED FOR PALPITATIONS 60 Tab 1   • cefdinir (OMNICEF) 300 MG Cap Take 1 Cap by mouth 2 times a day. 20 Cap 0   • ipratropium-albuterol (DUONEB) 0.5-2.5 (3) MG/3ML nebulizer solution 3 mL by Nebulization route 4 times a day. 75 mL 2   • albuterol 108 (90 BASE) MCG/ACT Aero Soln inhalation aerosol Inhale 2 Puffs by mouth every 6 hours as needed for Shortness of Breath. 1 Inhaler 1   • fluticasone (FLONASE) 50 MCG/ACT nasal spray Spray 1-2 Sprays in nose 1 time daily as needed. Indications: Hayfever 1 Bottle 3   • ipratropium (ATROVENT) 0.03 % Solution Spray 2 Sprays in nose 2 times a day. 1 Bottle 2   • spironolactone (ALDACTONE) 25 MG Tab TAKE 1 TABLET BY MOUTH TWICE DAILY 60 Tab 3   • azithromycin (ZITHROMAX Z-ALEJANDRO) 250 MG Tab Take 2 po today and then 1 daily until gone (Patient not taking: Reported on 12/14/2017) 6 Tab 0   • benzonatate (TESSALON) 200 MG capsule Take 1 Cap by mouth 3 times a day as needed for Cough. 30 Cap 0   • Brimonidine Tartrate (MIRVASO) 0.33 % Gel by Apply externally route.     • granisetron (KYTRIL) 1 MG Tab Take 1 Tab by mouth every 12 hours as needed for Nausea/Vomiting. 30 Tab 1   • diazepam (VALIUM) 5 MG Tab TAKE 1/2 TO 1 TABLET BY MOUTH EVERY 6 HOURS AS NEEDED FOR MUSCLE SPASM 120 Tab 0   • ondansetron (ZOFRAN ODT) 4 MG TABLET DISPERSIBLE Take 2 Tabs by mouth every 8 hours as needed. 30 Tab 3   • potassium chloride (MICRO-K) 10 MEQ capsule Take 2 Caps by mouth 1 time daily as needed. WITH LASIX ONLY 60 Cap 2   • nystatin (MYCOSTATIN) 117887 UNIT/ML Suspension Take 5 mL by mouth 4 times a day. 120 mL 1   • cyanocobalamin (VITAMIN B-12) 1000 MCG/ML Solution INJECT 1 ML IN THE MUSCLE EVERY 30 DAYS 10 mL 1   • acyclovir (ZOVIRAX) 200 MG/5ML Suspension SHAKE LQ AND TK 10 ML PO BID FOR 7  "DAYS  0   • vitamin D, Ergocalciferol, (DRISDOL) 01929 UNITS Cap capsule Take 1 Cap by mouth every 7 days. 4 Cap 11   • torsemide (DEMADEX) 10 MG tablet Take 1 Tab by mouth every day. Prn swelling 30 Tab 3   • guaifenesin-codeine (TUSSI-ORGANIDIN NR) 100-10 MG/5ML syrup Take 5 mL by mouth 3 times a day as needed for Cough. 120 mL 0   • promethazine-codeine (PHENERGAN-CODEINE) 6.25-10 MG/5ML Syrup Take 5 mL by mouth 4 times a day as needed for Cough. 120 mL 0   • zolpidem (AMBIEN) 10 MG Tab TAKE 1 TABLET BY MOUTH AT BEDTIME AS NEEDED FOR SLEEP 30 Tab 0   • Sulfacetamide Sodium-Sulfur 10-1 % Emulsion Use as directed once daily. 1 Tube 1   • scopolamine (TRANSDERM-SCOP) 1.5 MG/3DAYS PATCH 72 HR Apply 1 Patch to skin as directed every 72 hours. 4 Patch 1   • estradiol (ESTRACE) 0.1 MG/GM vaginal cream Insert 0.5g vaginally three times each week. 1 Tube 6   • BD INTEGRA SYRINGE 25G X 1\" 3 ML Misc USE 1 SYRINGE AS DIRECTED EVERY 30 DAYS 11 Each 0   • lidocaine (LIDODERM) 5 % PTCH   1   • furosemide (LASIX) 20 MG TABS Take 20 mg by mouth 1 time daily as needed. Indications: Edema     • Probiotic Product (PROBIOTIC DAILY) CAPS Take 1 Cap by mouth every day.     • cyanocobalamin (VITAMIN B-12) 1000 MCG/ML SOLN 1 mL by Intramuscular route every 30 days. 1 mL 0   • cetirizine (ZYRTEC) 10 MG TABS Take 10 mg by mouth 1 time daily as needed. For allergies     • oxycodone 20 MG/ML CONC Take 30 mg by mouth every four hours as needed. 20mg/ml solution       Current Facility-Administered Medications on File Prior to Visit   Medication Dose Route Frequency Provider Last Rate Last Dose   • cyanocobalamin (VITAMIN B-12) injection 1,000 mcg  1,000 mcg Intramuscular Q30 DAYS Amanda Bazzi M.D.   1,000 mcg at 05/03/17 1452     Blood pressure 142/84, pulse 100, temperature 36.8 °C (98.3 °F), resp. rate 14, height 1.829 m (6'), weight 69.9 kg (154 lb), SpO2 98 %.  Family History   Problem Relation Age of Onset   • Lung Disease Mother      " copd   • Diabetes Father    • Diabetes Sister    • Cancer Maternal Aunt      breast       Physical Exam:    HEENT: PERRLA, EOMI, no scleral icterus, no nasal or oral lesions  Neck: No thyromegaly, no adenopathy, no bruits  Mallampatti: Grade II  Lungs: Equal breath sounds, no wheezes or crackles  Heart: Regular rate and rhythm, no gallops or murmurs  Abdomen: Soft, benign, no organomegaly.Colostomy  Extremities: No clubbing, cyanosis, or edema  Neurologic: Cranial nerve, motor, and sensory exam are normal    1. TRUDY (obstructive sleep apnea)        We have already done an initial sleep study and then titration demonstrating complex sleep apnea responsive to CPAP at 7 cm of water pressure. We will restart CPAP at that pressure. We will also have her go to our sleep center for a mask fit if her original mask is giving her problems. We will see her in follow-up in several months to assess efficacy and compliance.  Answers for HPI/ROS submitted by the patient on 12/14/2017   What is the reason for your visit today?: Lack of sleep.   Do you cough first thing in the morning or at other times during the day?: other times of the day  Do you have a cough on most days? If so, how long have you had this cough?: yes  Bring up phlegm (mucus, sputum) in the morning or other times during the day?: yes. after brushing my teeth or when I sleep I choke  If so, how long have you produced phlegm (Months, Years), and what is the usual color of your phlegm?: not sure but at least the last four yrs. yellow  Do you cough up blood from your chest?: No  Do you experience wheezing?: Yes  How often?: frequently  Do you experience any chest tightness?: Yes  How often?: constant  Experience shortness of breath?: Yes  Experience shortness of breath at rest?: Yes  How far can you walk before becoming short of breath or need to rest? : depends if walking up hill or flat. altitude or sea level  Please list what causes you to become short of breath::  pollution, smoke, trying to keep up with Goyo, and sometimes a tightness in chest for no reason  Have you ever been hospitalized?: Yes  Reason, year, and hospital in which you were hospitalized:: [id number] to many to list. [facility]  Have you ever needed to be intubated or placed on a ventilator? : No  Have you ever had problems with anesthesia?: No  Have you experienced post-operative delirium?: Yes  Any complications with surgery?: Yes  What year did you receive your last Flu shot?: 2017  What year did you receive you last Pneumonia shot?: 2012  Have you had a TB skin test? If so, please list the year and result:: not that I am aware of  Have you had Allergy skin testing? If so, please list the year and result:: yes  1980's  [place]ine, Alfalfa hay, smoke ?  Please list all your occupations from your first job to your current job. Include Industry/Company, location, year, and your specific job.: I worked as a teenager at [employer] [place], [place] 1960's. 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  Please list the places you have lived, the year, and Country or State in the [place], [place], [place] 1956, [facility] 1959, [place], [place], 1964, [place], [place] 1965, [place], [place] 1984, [place], [place] 1993,[facility], 1971, 1977- 1984  Dogs?: Yes  Cats?: No  Mice and/or Deer Mice?: Yes  Reptiles?: No  Blood Chemistries: [facility]   Chest X-ray: [facility]  Pulmonary Function Test: yes-your office 20??  Electrocardiogram: [facility]  Sleep Study: yes-your office  Coffee: 1 cup  Tea: non caffeine herbal tea not everyday  Carbonated soft drinks: flat soda mix with half  ice--two    Conflicting answers have been found for some questions. Please document the patient's answers manually. ***      Electronically signed by Man Tinoco M.D.  on 12/14/17    No Recipients

## 2017-12-14 NOTE — LETTER
Man Tinoco M.D.  [facility] Pulmonary Medicine   [address] [place] [address] [place]  [place] [address]  Phone: [phone] - Fax: [phone]           Encounter Date: 12/14/2017  Provider: Man Tinoco M.D.  Location of Care: [facility] PULMONARY MEDICINE      Patient:   Jana Overton   MR Number: [id number]   YOB: 1955     PROGRESS NOTE:  Chief Complaint   Patient presents with   • New Patient     Evaluation for TRUDY       HPI:  Patient is a 62-year-old woman who we saw several years ago. She does have a history of mild asthma. At the time we saw her she was also noted to have nocturnal oxygen desaturation and eventually had a sleep study showing evidence of complex sleep apnea. The patient had a respiratory disturbance index of 28 events per hour. She had a significant number of central events. Her events were associated with oxygen desaturation. A titration study showed successful titration at 7 cm of water pressure. However, she was never able to tolerate CPAP. A mask that properly fit was never found. She then used supplemental oxygen at night at 3 L/m. Recently she had a cognitive evaluation because of some memory issues. It has been suggested that she restart CPAP.  She does have significant medical problems secondary to her chronic Crohn's disease    Past Medical History:   Diagnosis Date   • Anesthesia     difficult Iv stick   • Anxiety    • Arthritis     all over   • ASTHMA    • Atrial fib/flut    • Backpain    • Breath shortness    • Bronchitis     5 years   • Chronic pain    • Colostomy in place (CMS-HCC) 10/14/2010   • COPD (chronic obstructive pulmonary disease) (CMS-HCC) 6/24/2014   • COPD (chronic obstructive pulmonary disease) (CMS-HCC) 6/24/2014   • Crohn's disease of colon (CMS-HCC)    • Dyspnea    • History of cardiac murmur    • Hypokalemia 6/24/2014   • Indigestion    • Infectious disease     MRSA, VancoRSA   • Multiple falls 6/24/2014   •  Narcotic dependence ([facility]) 9/23/2009   • Obstruction    • Other specified disorder of intestines     crohns disease   • Pain 7/11/13    all over   • Pneumonia     child and mid 30's   • Postherpetic neuralgia 6/24/2014   • Rosacea 6/24/2014   • Sleep apnea        ROS:  Constitutional: Denies fevers, chills, night sweats, fatigue or weight loss  Eyes: Denies vision loss, pain, drainage, double vision  Ears, Nose, Throat: Denies earache, tinnitus, hoarseness.She has had some dizziness from Ménière's disease.  Cardiovascular: Denies chest pain, tightness, palpitations  Respiratory: Denies  sputum production, cough, hemoptysis. She does have some occasional shortness of breath  Sleep: See history of present illness  GI: Crohn's disease with colostomy  : Denies frequent urination, hematuria, painful urination  Musculoskeletal: Denies back pain, painful joints, sore muscles  Neurological: Denies headaches, seizures  Skin: Denies rashes, color changes  Psychiatric: Denies depression or thoughts of suicide  Hematologic: Denies bleeding tendency or clotting tendency  Allergic/Immunologic: Denies rhinitis, skin sensitivity    Social History     Social History   • Marital status:      Spouse name: N/A   • Number of children: N/A   • Years of education: N/A     Occupational History   • Not on file.     Social History Main Topics   • Smoking status: Never Smoker   • Smokeless tobacco: Never Used   • Alcohol use No   • Drug use: No   • Sexual activity: Not on file      Comment:      Other Topics Concern   • Not on file     Social History Narrative   • No narrative on file     Morphine; Ativan [lorazepam]; Azathioprine sodium; Demerol; Elavil [amitriptyline]; Fentanyl; Kdc:fentanyl; Lyrica [pregabalin]; Methadone; Methotrexate; Methotrexate derivatives; Pseudoephedrine; Remicade [infliximab injection]; Roxanol [morphine sulfate]; Sulfa drugs; Sulfasalazine; Tape; Tizanidine hydrochloride; and Celestone  [betamethasone sodium phosphate]  Current Outpatient Prescriptions on File Prior to Visit   Medication Sig Dispense Refill   • metoprolol (LOPRESSOR) 25 MG Tab TAKE 1 TABLET BY MOUTH THREE TIMES DAILY AS NEEDED FOR PALPITATIONS 60 Tab 1   • cefdinir (OMNICEF) 300 MG Cap Take 1 Cap by mouth 2 times a day. 20 Cap 0   • ipratropium-albuterol (DUONEB) 0.5-2.5 (3) MG/3ML nebulizer solution 3 mL by Nebulization route 4 times a day. 75 mL 2   • albuterol 108 (90 BASE) MCG/ACT Aero Soln inhalation aerosol Inhale 2 Puffs by mouth every 6 hours as needed for Shortness of Breath. 1 Inhaler 1   • fluticasone (FLONASE) 50 MCG/ACT nasal spray Spray 1-2 Sprays in nose 1 time daily as needed. Indications: Hayfever 1 Bottle 3   • ipratropium (ATROVENT) 0.03 % Solution Spray 2 Sprays in nose 2 times a day. 1 Bottle 2   • spironolactone (ALDACTONE) 25 MG Tab TAKE 1 TABLET BY MOUTH TWICE DAILY 60 Tab 3   • azithromycin (ZITHROMAX Z-ALEJANDRO) 250 MG Tab Take 2 po today and then 1 daily until gone (Patient not taking: Reported on 12/14/2017) 6 Tab 0   • benzonatate (TESSALON) 200 MG capsule Take 1 Cap by mouth 3 times a day as needed for Cough. 30 Cap 0   • Brimonidine Tartrate (MIRVASO) 0.33 % Gel by Apply externally route.     • granisetron (KYTRIL) 1 MG Tab Take 1 Tab by mouth every 12 hours as needed for Nausea/Vomiting. 30 Tab 1   • diazepam (VALIUM) 5 MG Tab TAKE 1/2 TO 1 TABLET BY MOUTH EVERY 6 HOURS AS NEEDED FOR MUSCLE SPASM 120 Tab 0   • ondansetron (ZOFRAN ODT) 4 MG TABLET DISPERSIBLE Take 2 Tabs by mouth every 8 hours as needed. 30 Tab 3   • potassium chloride (MICRO-K) 10 MEQ capsule Take 2 Caps by mouth 1 time daily as needed. WITH LASIX ONLY 60 Cap 2   • nystatin (MYCOSTATIN) 020139 UNIT/ML Suspension Take 5 mL by mouth 4 times a day. 120 mL 1   • cyanocobalamin (VITAMIN B-12) 1000 MCG/ML Solution INJECT 1 ML IN THE MUSCLE EVERY 30 DAYS 10 mL 1   • acyclovir (ZOVIRAX) 200 MG/5ML Suspension SHAKE LQ AND TK 10 ML PO BID FOR 7  "DAYS  0   • vitamin D, Ergocalciferol, (DRISDOL) 78290 UNITS Cap capsule Take 1 Cap by mouth every 7 days. 4 Cap 11   • torsemide (DEMADEX) 10 MG tablet Take 1 Tab by mouth every day. Prn swelling 30 Tab 3   • guaifenesin-codeine (TUSSI-ORGANIDIN NR) 100-10 MG/5ML syrup Take 5 mL by mouth 3 times a day as needed for Cough. 120 mL 0   • promethazine-codeine (PHENERGAN-CODEINE) 6.25-10 MG/5ML Syrup Take 5 mL by mouth 4 times a day as needed for Cough. 120 mL 0   • zolpidem (AMBIEN) 10 MG Tab TAKE 1 TABLET BY MOUTH AT BEDTIME AS NEEDED FOR SLEEP 30 Tab 0   • Sulfacetamide Sodium-Sulfur 10-1 % Emulsion Use as directed once daily. 1 Tube 1   • scopolamine (TRANSDERM-SCOP) 1.5 MG/3DAYS PATCH 72 HR Apply 1 Patch to skin as directed every 72 hours. 4 Patch 1   • estradiol (ESTRACE) 0.1 MG/GM vaginal cream Insert 0.5g vaginally three times each week. 1 Tube 6   • BD INTEGRA SYRINGE 25G X 1\" 3 ML Misc USE 1 SYRINGE AS DIRECTED EVERY 30 DAYS 11 Each 0   • lidocaine (LIDODERM) 5 % PTCH   1   • furosemide (LASIX) 20 MG TABS Take 20 mg by mouth 1 time daily as needed. Indications: Edema     • Probiotic Product (PROBIOTIC DAILY) CAPS Take 1 Cap by mouth every day.     • cyanocobalamin (VITAMIN B-12) 1000 MCG/ML SOLN 1 mL by Intramuscular route every 30 days. 1 mL 0   • cetirizine (ZYRTEC) 10 MG TABS Take 10 mg by mouth 1 time daily as needed. For allergies     • oxycodone 20 MG/ML CONC Take 30 mg by mouth every four hours as needed. 20mg/ml solution       Current Facility-Administered Medications on File Prior to Visit   Medication Dose Route Frequency Provider Last Rate Last Dose   • cyanocobalamin (VITAMIN B-12) injection 1,000 mcg  1,000 mcg Intramuscular Q30 DAYS Amanda Bazzi M.D.   1,000 mcg at 05/03/17 1452     Blood pressure 142/84, pulse 100, temperature 36.8 °C (98.3 °F), resp. rate 14, height 1.829 m (6'), weight 69.9 kg (154 lb), SpO2 98 %.  Family History   Problem Relation Age of Onset   • Lung Disease Mother      " copd   • Diabetes Father    • Diabetes Sister    • Cancer Maternal Aunt      breast       Physical Exam:    HEENT: PERRLA, EOMI, no scleral icterus, no nasal or oral lesions  Neck: No thyromegaly, no adenopathy, no bruits  Mallampatti: Grade II  Lungs: Equal breath sounds, no wheezes or crackles  Heart: Regular rate and rhythm, no gallops or murmurs  Abdomen: Soft, benign, no organomegaly.Colostomy  Extremities: No clubbing, cyanosis, or edema  Neurologic: Cranial nerve, motor, and sensory exam are normal    1. TRUDY (obstructive sleep apnea)        We have already done an initial sleep study and then titration demonstrating complex sleep apnea responsive to CPAP at 7 cm of water pressure. We will restart CPAP at that pressure. We will also have her go to our sleep center for a mask fit if her original mask is giving her problems. We will see her in follow-up in several months to assess efficacy and compliance.  Answers for HPI/ROS submitted by the patient on 12/14/2017   What is the reason for your visit today?: Lack of sleep.   Do you cough first thing in the morning or at other times during the day?: other times of the day  Do you have a cough on most days? If so, how long have you had this cough?: yes  Bring up phlegm (mucus, sputum) in the morning or other times during the day?: yes. after brushing my teeth or when I sleep I choke  If so, how long have you produced phlegm (Months, Years), and what is the usual color of your phlegm?: not sure but at least the last four yrs. yellow  Do you cough up blood from your chest?: No  Do you experience wheezing?: Yes  How often?: frequently  Do you experience any chest tightness?: Yes  How often?: constant  Experience shortness of breath?: Yes  Experience shortness of breath at rest?: Yes  How far can you walk before becoming short of breath or need to rest? : depends if walking up hill or flat. altitude or sea level  Please list what causes you to become short of breath::  pollution, smoke, trying to keep up with Goyo, and sometimes a tightness in chest for no reason  Have you ever been hospitalized?: Yes  Reason, year, and hospital in which you were hospitalized:: [id number] to many to list. [facility]  Have you ever needed to be intubated or placed on a ventilator? : No  Have you ever had problems with anesthesia?: No  Have you experienced post-operative delirium?: Yes  Any complications with surgery?: Yes  What year did you receive your last Flu shot?: 2017  What year did you receive you last Pneumonia shot?: 2012  Have you had a TB skin test? If so, please list the year and result:: not that I am aware of  Have you had Allergy skin testing? If so, please list the year and result:: yes  1980's  [place], Alfalfa hay, smoke ?  Please list all your occupations from your first job to your current job. Include Industry/Company, location, year, and your specific job.: I worked as a teenager at [employer] [place], [place] 1960's. 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  Please list the places you have lived, the year, and Country or State in the [place], [place] 1954, [place] 1956, [facility] 1959, [place], [place], 1964, [place], [place] 1965, [place], [place] 1984, [place], [place] 1993,[facility], 1971, 1977- 1984  Dogs?: Yes  Cats?: No  Mice and/or Deer Mice?: Yes  Reptiles?: No  Blood Chemistries: [facility]   Chest X-ray: [facility]  Pulmonary Function Test: yes-your office 20??  Electrocardiogram: [facility]  Sleep Study: yes-your office  Coffee: 1 cup  Tea: non caffeine herbal tea not everyday  Carbonated soft drinks: flat soda mix with half  ice--two    Conflicting answers have been found for some questions. Please document the patient's answers manually.       Electronically signed by Man Tinoco M.D.  on 12/14/17    No Recipients

## 2017-12-15 NOTE — PROGRESS NOTES
Chief Complaint   Patient presents with   • New Patient     Evaluation for TRUDY       HPI:  Patient is a 62-year-old woman who we saw several years ago. She does have a history of mild asthma. At the time we saw her she was also noted to have nocturnal oxygen desaturation and eventually had a sleep study showing evidence of complex sleep apnea. The patient had a respiratory disturbance index of 28 events per hour. She had a significant number of central events. Her events were associated with oxygen desaturation. A titration study showed successful titration at 7 cm of water pressure. However, she was never able to tolerate CPAP. A mask that properly fit was never found. She then used supplemental oxygen at night at 3 L/m. Recently she had a cognitive evaluation because of some memory issues. It has been suggested that she restart CPAP.  She does have significant medical problems secondary to her chronic Crohn's disease    Past Medical History:   Diagnosis Date   • Anesthesia     difficult Iv stick   • Anxiety    • Arthritis     all over   • ASTHMA    • Atrial fib/flut    • Backpain    • Breath shortness    • Bronchitis     5 years   • Chronic pain    • Colostomy in place (CMS-HCC) 10/14/2010   • COPD (chronic obstructive pulmonary disease) (CMS-HCC) 6/24/2014   • COPD (chronic obstructive pulmonary disease) (CMS-HCC) 6/24/2014   • Crohn's disease of colon (CMS-HCC)    • Dyspnea    • History of cardiac murmur    • Hypokalemia 6/24/2014   • Indigestion    • Infectious disease     MRSA, VancoRSA   • Multiple falls 6/24/2014   • Narcotic dependence (CMS-HCC) 9/23/2009   • Obstruction    • Other specified disorder of intestines     crohns disease   • Pain 7/11/13    all over   • Pneumonia     child and mid 30's   • Postherpetic neuralgia 6/24/2014   • Rosacea 6/24/2014   • Sleep apnea        ROS:  Constitutional: Denies fevers, chills, night sweats, fatigue or weight loss  Eyes: Denies vision loss, pain, drainage, double  vision  Ears, Nose, Throat: Denies earache, tinnitus, hoarseness.She has had some dizziness from Ménière's disease.  Cardiovascular: Denies chest pain, tightness, palpitations  Respiratory: Denies  sputum production, cough, hemoptysis. She does have some occasional shortness of breath  Sleep: See history of present illness  GI: Crohn's disease with colostomy  : Denies frequent urination, hematuria, painful urination  Musculoskeletal: Denies back pain, painful joints, sore muscles  Neurological: Denies headaches, seizures  Skin: Denies rashes, color changes  Psychiatric: Denies depression or thoughts of suicide  Hematologic: Denies bleeding tendency or clotting tendency  Allergic/Immunologic: Denies rhinitis, skin sensitivity    Social History     Social History   • Marital status:      Spouse name: N/A   • Number of children: N/A   • Years of education: N/A     Occupational History   • Not on file.     Social History Main Topics   • Smoking status: Never Smoker   • Smokeless tobacco: Never Used   • Alcohol use No   • Drug use: No   • Sexual activity: Not on file      Comment:      Other Topics Concern   • Not on file     Social History Narrative   • No narrative on file     Morphine; Ativan [lorazepam]; Azathioprine sodium; Demerol; Elavil [amitriptyline]; Fentanyl; Kdc:fentanyl; Lyrica [pregabalin]; Methadone; Methotrexate; Methotrexate derivatives; Pseudoephedrine; Remicade [infliximab injection]; Roxanol [morphine sulfate]; Sulfa drugs; Sulfasalazine; Tape; Tizanidine hydrochloride; and Celestone [betamethasone sodium phosphate]  Current Outpatient Prescriptions on File Prior to Visit   Medication Sig Dispense Refill   • metoprolol (LOPRESSOR) 25 MG Tab TAKE 1 TABLET BY MOUTH THREE TIMES DAILY AS NEEDED FOR PALPITATIONS 60 Tab 1   • cefdinir (OMNICEF) 300 MG Cap Take 1 Cap by mouth 2 times a day. 20 Cap 0   • ipratropium-albuterol (DUONEB) 0.5-2.5 (3) MG/3ML nebulizer solution 3 mL by Nebulization  route 4 times a day. 75 mL 2   • albuterol 108 (90 BASE) MCG/ACT Aero Soln inhalation aerosol Inhale 2 Puffs by mouth every 6 hours as needed for Shortness of Breath. 1 Inhaler 1   • fluticasone (FLONASE) 50 MCG/ACT nasal spray Spray 1-2 Sprays in nose 1 time daily as needed. Indications: Hayfever 1 Bottle 3   • ipratropium (ATROVENT) 0.03 % Solution Spray 2 Sprays in nose 2 times a day. 1 Bottle 2   • spironolactone (ALDACTONE) 25 MG Tab TAKE 1 TABLET BY MOUTH TWICE DAILY 60 Tab 3   • azithromycin (ZITHROMAX Z-ALEJANDRO) 250 MG Tab Take 2 po today and then 1 daily until gone (Patient not taking: Reported on 12/14/2017) 6 Tab 0   • benzonatate (TESSALON) 200 MG capsule Take 1 Cap by mouth 3 times a day as needed for Cough. 30 Cap 0   • Brimonidine Tartrate (MIRVASO) 0.33 % Gel by Apply externally route.     • granisetron (KYTRIL) 1 MG Tab Take 1 Tab by mouth every 12 hours as needed for Nausea/Vomiting. 30 Tab 1   • diazepam (VALIUM) 5 MG Tab TAKE 1/2 TO 1 TABLET BY MOUTH EVERY 6 HOURS AS NEEDED FOR MUSCLE SPASM 120 Tab 0   • ondansetron (ZOFRAN ODT) 4 MG TABLET DISPERSIBLE Take 2 Tabs by mouth every 8 hours as needed. 30 Tab 3   • potassium chloride (MICRO-K) 10 MEQ capsule Take 2 Caps by mouth 1 time daily as needed. WITH LASIX ONLY 60 Cap 2   • nystatin (MYCOSTATIN) 549372 UNIT/ML Suspension Take 5 mL by mouth 4 times a day. 120 mL 1   • cyanocobalamin (VITAMIN B-12) 1000 MCG/ML Solution INJECT 1 ML IN THE MUSCLE EVERY 30 DAYS 10 mL 1   • acyclovir (ZOVIRAX) 200 MG/5ML Suspension SHAKE LQ AND TK 10 ML PO BID FOR 7 DAYS  0   • vitamin D, Ergocalciferol, (DRISDOL) 16547 UNITS Cap capsule Take 1 Cap by mouth every 7 days. 4 Cap 11   • torsemide (DEMADEX) 10 MG tablet Take 1 Tab by mouth every day. Prn swelling 30 Tab 3   • guaifenesin-codeine (TUSSI-ORGANIDIN NR) 100-10 MG/5ML syrup Take 5 mL by mouth 3 times a day as needed for Cough. 120 mL 0   • promethazine-codeine (PHENERGAN-CODEINE) 6.25-10 MG/5ML Syrup Take 5 mL by  "mouth 4 times a day as needed for Cough. 120 mL 0   • zolpidem (AMBIEN) 10 MG Tab TAKE 1 TABLET BY MOUTH AT BEDTIME AS NEEDED FOR SLEEP 30 Tab 0   • Sulfacetamide Sodium-Sulfur 10-1 % Emulsion Use as directed once daily. 1 Tube 1   • scopolamine (TRANSDERM-SCOP) 1.5 MG/3DAYS PATCH 72 HR Apply 1 Patch to skin as directed every 72 hours. 4 Patch 1   • estradiol (ESTRACE) 0.1 MG/GM vaginal cream Insert 0.5g vaginally three times each week. 1 Tube 6   • BD INTEGRA SYRINGE 25G X 1\" 3 ML Misc USE 1 SYRINGE AS DIRECTED EVERY 30 DAYS 11 Each 0   • lidocaine (LIDODERM) 5 % PTCH   1   • furosemide (LASIX) 20 MG TABS Take 20 mg by mouth 1 time daily as needed. Indications: Edema     • Probiotic Product (PROBIOTIC DAILY) CAPS Take 1 Cap by mouth every day.     • cyanocobalamin (VITAMIN B-12) 1000 MCG/ML SOLN 1 mL by Intramuscular route every 30 days. 1 mL 0   • cetirizine (ZYRTEC) 10 MG TABS Take 10 mg by mouth 1 time daily as needed. For allergies     • oxycodone 20 MG/ML CONC Take 30 mg by mouth every four hours as needed. 20mg/ml solution       Current Facility-Administered Medications on File Prior to Visit   Medication Dose Route Frequency Provider Last Rate Last Dose   • cyanocobalamin (VITAMIN B-12) injection 1,000 mcg  1,000 mcg Intramuscular Q30 DAYS Amanda Bazzi M.D.   1,000 mcg at 05/03/17 1452     Blood pressure 142/84, pulse 100, temperature 36.8 °C (98.3 °F), resp. rate 14, height 1.829 m (6'), weight 69.9 kg (154 lb), SpO2 98 %.  Family History   Problem Relation Age of Onset   • Lung Disease Mother      copd   • Diabetes Father    • Diabetes Sister    • Cancer Maternal Aunt      breast       Physical Exam:    HEENT: PERRLA, EOMI, no scleral icterus, no nasal or oral lesions  Neck: No thyromegaly, no adenopathy, no bruits  Mallampatti: Grade II  Lungs: Equal breath sounds, no wheezes or crackles  Heart: Regular rate and rhythm, no gallops or murmurs  Abdomen: Soft, benign, no " organomegaly.Colostomy  Extremities: No clubbing, cyanosis, or edema  Neurologic: Cranial nerve, motor, and sensory exam are normal    1. TRUDY (obstructive sleep apnea)        We have already done an initial sleep study and then titration demonstrating complex sleep apnea responsive to CPAP at 7 cm of water pressure. We will restart CPAP at that pressure. We will also have her go to our sleep center for a mask fit if her original mask is giving her problems. We will see her in follow-up in several months to assess efficacy and compliance.  Answers for HPI/ROS submitted by the patient on 12/14/2017   What is the reason for your visit today?: Lack of sleep.   Do you cough first thing in the morning or at other times during the day?: other times of the day  Do you have a cough on most days? If so, how long have you had this cough?: yes  Bring up phlegm (mucus, sputum) in the morning or other times during the day?: yes. after brushing my teeth or when I sleep I choke  If so, how long have you produced phlegm (Months, Years), and what is the usual color of your phlegm?: not sure but at least the last four yrs. yellow  Do you cough up blood from your chest?: No  Do you experience wheezing?: Yes  How often?: frequently  Do you experience any chest tightness?: Yes  How often?: constant  Experience shortness of breath?: Yes  Experience shortness of breath at rest?: Yes  How far can you walk before becoming short of breath or need to rest? : depends if walking up hill or flat. altitude or sea level  Please list what causes you to become short of breath:: pollution, smoke, trying to keep up with [place], and sometimes a tightness in chest for no reason  Have you ever been hospitalized?: Yes  Reason, year, and hospital in which you were hospitalized:: [id number] to many to list. [facility]  Have you ever needed to be intubated or placed on a ventilator? : No  Have you ever had problems with anesthesia?: No  Have you  experienced post-operative delirium?: Yes  Any complications with surgery?: Yes  What year did you receive your last Flu shot?: 2017  What year did you receive you last Pneumonia shot?: 2012  Have you had a TB skin test? If so, please list the year and result:: not that I am aware of  Have you had Allergy skin testing? If so, please list the year and result:: yes  1980's  [place], Alfalfa hay, smoke ?  Please list all your occupations from your first job to your current job. Include Industry/Company, location, year, and your specific job.: I worked as a teenager at [employer], [place] 1960[place]. 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  Please list the places you have lived, the year, and Country or State in the [place], [facility] 1954, [place] 1956, [facility] 1959, [place], [place], 1964, [place], [place] 1965, [place], [place] 1984, [place], [place] [address], 1977- 1984  Dogs?: Yes  Cats?: No  Mice and/or Deer Mice?: Yes  Reptiles?: No  Blood Chemistries: [facility]   Chest X-ray: [facility]  Pulmonary Function Test: yes-your office 20??  Electrocardiogram: [facility]  Sleep Study: yes-your office  Coffee: 1 cup  Tea: non caffeine herbal tea not everyday  Carbonated soft drinks: flat soda mix with half ice--two    Conflicting answers have been found for some questions. Please document the patient's answers manually.

## 2017-12-18 ENCOUNTER — DOCUMENTATION (OUTPATIENT)
Dept: BEHAVIORAL HEALTH | Facility: PHYSICIAN GROUP | Age: 62
End: 2017-12-18

## 2017-12-18 ENCOUNTER — HOSPITAL ENCOUNTER (OUTPATIENT)
Dept: RADIOLOGY | Facility: MEDICAL CENTER | Age: 62
End: 2017-12-18
Attending: FAMILY MEDICINE
Payer: MEDICARE

## 2017-12-18 DIAGNOSIS — R05.9 COUGH: ICD-10-CM

## 2017-12-18 PROCEDURE — 71020 DX-CHEST-2 VIEWS: CPT

## 2017-12-18 NOTE — TELEPHONE ENCOUNTER
Patient called back 12/13 and stated she has not seen a cardiologist and has not had any recent EKG done.

## 2017-12-22 ENCOUNTER — OFFICE VISIT (OUTPATIENT)
Dept: BEHAVIORAL HEALTH | Facility: PHYSICIAN GROUP | Age: 62
End: 2017-12-22
Payer: MEDICARE

## 2017-12-22 DIAGNOSIS — F06.8 ANXIETY DISORDER DUE TO MULTIPLE MEDICAL PROBLEMS: ICD-10-CM

## 2017-12-22 PROCEDURE — 90834 PSYTX W PT 45 MINUTES: CPT | Performed by: PSYCHOLOGIST

## 2017-12-26 ENCOUNTER — OFFICE VISIT (OUTPATIENT)
Dept: CARDIOLOGY | Facility: MEDICAL CENTER | Age: 62
End: 2017-12-26
Payer: MEDICARE

## 2017-12-26 VITALS
BODY MASS INDEX: 20.86 KG/M2 | HEART RATE: 100 BPM | WEIGHT: 154 LBS | HEIGHT: 72 IN | OXYGEN SATURATION: 98 % | DIASTOLIC BLOOD PRESSURE: 80 MMHG | SYSTOLIC BLOOD PRESSURE: 140 MMHG

## 2017-12-26 DIAGNOSIS — I10 ESSENTIAL (PRIMARY) HYPERTENSION: ICD-10-CM

## 2017-12-26 DIAGNOSIS — R06.02 SHORTNESS OF BREATH: ICD-10-CM

## 2017-12-26 DIAGNOSIS — I48.91 ATRIAL FIBRILLATION, UNSPECIFIED TYPE (HCC): Primary | ICD-10-CM

## 2017-12-26 DIAGNOSIS — R07.89 OTHER CHEST PAIN: ICD-10-CM

## 2017-12-26 LAB — EKG IMPRESSION: NORMAL

## 2017-12-26 PROCEDURE — 93000 ELECTROCARDIOGRAM COMPLETE: CPT | Performed by: INTERNAL MEDICINE

## 2017-12-26 PROCEDURE — 99215 OFFICE O/P EST HI 40 MIN: CPT | Mod: 25 | Performed by: INTERNAL MEDICINE

## 2017-12-26 RX ORDER — OMEPRAZOLE 20 MG/1
20 CAPSULE, DELAYED RELEASE ORAL DAILY
Status: DISCONTINUED | OUTPATIENT
Start: 2017-12-26 | End: 2017-12-26

## 2017-12-26 RX ORDER — ASPIRIN 81 MG/1
81 TABLET, CHEWABLE ORAL DAILY
Qty: 100 TAB | Refills: 1 | Status: SHIPPED | OUTPATIENT
Start: 2017-12-26 | End: 2018-12-04

## 2017-12-26 RX ORDER — TORSEMIDE 10 MG/1
10 TABLET ORAL
COMMUNITY
Start: 2017-12-26 | End: 2018-07-10 | Stop reason: SDUPTHER

## 2017-12-26 RX ORDER — OMEPRAZOLE 20 MG/1
20 CAPSULE, DELAYED RELEASE ORAL DAILY
Qty: 30 CAP | Refills: 1
Start: 2017-12-26 | End: 2018-01-24

## 2017-12-26 ASSESSMENT — ENCOUNTER SYMPTOMS
WHEEZING: 1
PALPITATIONS: 1
CLAUDICATION: 1
ORTHOPNEA: 1
COUGH: 1
SHORTNESS OF BREATH: 1
IRREGULAR HEARTBEAT: 1
DYSPNEA ON EXERTION: 1

## 2017-12-26 NOTE — PROGRESS NOTES
Cardiology Follow-up Consultation Note    Date of note:    12/26/2017    Primary Care Provider: Amanda Bazzi M.D.  Referring Provider: Amanda Bazzi M.D.    Patient Name: Jana Overton   YOB: 1955  MRN:              [id number]    Chief Complaint: palpitations.     History of Present Illness: Jana Overton is a 62 y.o. female whose current medical problems include hypertension,  DVT, parosxysmal atrial fibrillation, TRUDY, COPD, and crohn's disease who is here for cardiac consultation for palpitations    She was last seen by Dr. Russo 5/2015.     Over the last 3 months she has had worsening palpitations and shortness of breath.  She takes lasix only twice a month.  She limits her salt intake.      She also has moderate severity left sided chest tightness which radiates down her left arm. This is not necessarily associated with exertion.  She has been told to go to the emergency room multiple times for evaluation, however she continues to refuse as she's scared of getting inadequate treatment there.      SBP at home occasionally down to 70s/40s, HR up to 160s.      + LE edema    Supposedly in the year 2000, she had a central line embolize to her coronary arteries.      She is currently not taking metoprolol or lasix. She does not take aspirin daily as it causes severe burning in her stomach.     Does see a pain specialist Dr. Telles and has been working on down-titrating opiates.     Review of Systems   Cardiovascular: Positive for chest pain, claudication, dyspnea on exertion, irregular heartbeat, leg swelling, orthopnea and palpitations.   Respiratory: Positive for cough, shortness of breath and wheezing.          All other systems reviewed and discussed using a comprehensive questionnaire which has been scanned into [employer] as part of this appointment.       Past Medical History:   Diagnosis Date   • Anesthesia     difficult Iv stick   • Anxiety    • Arthritis     all over   • ASTHMA    •  Atrial fib/flut    • Backpain    • Breath shortness    • Bronchitis     5 years   • Chronic pain    • Colostomy in place (CMS-HCC) 10/14/2010   • COPD (chronic obstructive pulmonary disease) (CMS-HCC) 6/24/2014   • COPD (chronic obstructive pulmonary disease) (CMS-HCC) 6/24/2014   • Crohn's disease of colon (CMS-HCC)    • Dyspnea    • History of cardiac murmur    • Hypokalemia 6/24/2014   • Indigestion    • Infectious disease     MRSA, VancoRSA   • Multiple falls 6/24/2014   • Narcotic dependence (CMS-HCC) 9/23/2009   • Obstruction    • Other specified disorder of intestines     crohns disease   • Pain 7/11/13    all over   • Pneumonia     child and mid 30's   • Postherpetic neuralgia 6/24/2014   • Rosacea 6/24/2014   • Sleep apnea          Past Surgical History:   Procedure Laterality Date   • ILEOSTOMY  5/14/2014    Performed by Kevin Cruz M.D. at SURGERY [facility]   • MAMMOPLASTY REDUCTION  7/17/2013    Performed by Janey Burt M.D. at SURGERY [facility]   • HARDWARE REMOVAL NEURO  7/16/2012    Performed by KEELEY KIM at SURGERY [facility]   • GASTROSCOPY  10/4/2011    Performed by TYSON MARTINEZ at SURGERY SAME DAY [facility] ORS   • COLONOSCOPY  10/4/2011    Performed by TYSON MARTINEZ at SURGERY SAME DAY [facility] ORS   • DILATION AND CURETTAGE  9/24/2010    Performed by GAURAV ALY at SURGERY SAME DAY [facility] ORS   • ILEOSTOMY  11/11/2009    Performed by KEVIN CRUZ at SURGERY [facility]   • GASTROSCOPY WITH BIOPSY  11/22/08    Performed by NEGRITA HUYNH at SURGERY [facility]   • ABDOMINAL EXPLORATION     • CERVICAL DISK AND FUSION ANTERIOR     • COLON RESECTION     • FOOT SURGERY     • HAND SURGERY     • LUMBAR FUSION ANTERIOR     • LUMPECTOMY     • OTHER ABDOMINAL SURGERY      surgery for chrons disease   • PB REDUCTION OF LARGE BREAST           Current Outpatient Prescriptions   Medication Sig Dispense Refill   • torsemide (DEMADEX) 10 MG tablet  Take 1 Tab by mouth 1 time daily as needed. Prn swelling     • aspirin (ASA) 81 MG Chew Tab chewable tablet Take 1 Tab by mouth every day. 100 Tab 1   • albuterol (VENTOLIN HFA) 108 (90 Base) MCG/ACT Aero Soln inhalation aerosol Inhale 2 Puffs by mouth every 6 hours as needed for Shortness of Breath.     • metoprolol (LOPRESSOR) 25 MG Tab TAKE 1 TABLET BY MOUTH THREE TIMES DAILY AS NEEDED FOR PALPITATIONS 60 Tab 1   • spironolactone (ALDACTONE) 25 MG Tab TAKE 1 TABLET BY MOUTH TWICE DAILY 60 Tab 3   • granisetron (KYTRIL) 1 MG Tab Take 1 Tab by mouth every 12 hours as needed for Nausea/Vomiting. 30 Tab 1   • diazepam (VALIUM) 5 MG Tab TAKE 1/2 TO 1 TABLET BY MOUTH EVERY 6 HOURS AS NEEDED FOR MUSCLE SPASM 120 Tab 0   • ondansetron (ZOFRAN ODT) 4 MG TABLET DISPERSIBLE Take 2 Tabs by mouth every 8 hours as needed. 30 Tab 3   • potassium chloride (MICRO-K) 10 MEQ capsule Take 2 Caps by mouth 1 time daily as needed. WITH LASIX ONLY 60 Cap 2   • ipratropium-albuterol (DUONEB) 0.5-2.5 (3) MG/3ML nebulizer solution 3 mL by Nebulization route 4 times a day. 75 mL 2   • cyanocobalamin (VITAMIN B-12) 1000 MCG/ML Solution INJECT 1 ML IN THE MUSCLE EVERY 30 DAYS 10 mL 1   • albuterol 108 (90 BASE) MCG/ACT Aero Soln inhalation aerosol Inhale 2 Puffs by mouth every 6 hours as needed for Shortness of Breath. 1 Inhaler 1   • fluticasone (FLONASE) 50 MCG/ACT nasal spray Spray 1-2 Sprays in nose 1 time daily as needed. Indications: Hayfever 1 Bottle 3   • ipratropium (ATROVENT) 0.03 % Solution Spray 2 Sprays in nose 2 times a day. 1 Bottle 2   • estradiol (ESTRACE) 0.1 MG/GM vaginal cream Insert 0.5g vaginally three times each week. 1 Tube 6   • lidocaine (LIDODERM) 5 % PTCH   1   • Probiotic Product (PROBIOTIC DAILY) CAPS Take 1 Cap by mouth every day.     • cyanocobalamin (VITAMIN B-12) 1000 MCG/ML SOLN 1 mL by Intramuscular route every 30 days. 1 mL 0   • cetirizine (ZYRTEC) 10 MG TABS Take 10 mg by mouth 1 time daily as needed.  "For allergies     • oxycodone 20 MG/ML CONC Take 30 mg by mouth every four hours as needed. 20mg/ml solution     • azelastine (OPTIVAR) 0.05 % ophthalmic solution   5   • azelastine (ASTELIN) 137 MCG/SPRAY nasal spray USE 2 SPRAYS IEN BID. STOP IF NOSE BLEED OCCURS  5   • fluconazole (DIFLUCAN) 150 MG tablet TK 1 T PO QD FOR 3 DAYS  0   • Brimonidine Tartrate (MIRVASO) 0.33 % Gel by Apply externally route.     • nystatin (MYCOSTATIN) 011531 UNIT/ML Suspension Take 5 mL by mouth 4 times a day. (Patient taking differently: Take 500,000 Units by mouth as needed.) 120 mL 1   • acyclovir (ZOVIRAX) 200 MG/5ML Suspension SHAKE LQ AND TK 10 ML PO BID FOR 7 DAYS  0   • BD INTEGRA SYRINGE 25G X 1\" 3 ML Misc USE 1 SYRINGE AS DIRECTED EVERY 30 DAYS 11 Each 0     Current Facility-Administered Medications   Medication Dose Route Frequency Provider Last Rate Last Dose   • omeprazole (PRILOSEC) capsule 20 mg  20 mg Oral DAILY Harvey Monahan M.D.       • cyanocobalamin (VITAMIN B-12) injection 1,000 mcg  1,000 mcg Intramuscular Q30 DAYS Amanda Bazzi M.D.   1,000 mcg at 05/03/17 1452         Allergies   Allergen Reactions   • Morphine Swelling   • Ativan [Lorazepam]      States give me nightmares.    • Azathioprine Sodium Hives   • Demerol Vomiting   • Elavil [Amitriptyline]      Nightmares     • Fentanyl      Patches caused skin breakdown, no pain relief   • Kdc:Fentanyl    • Lyrica [Pregabalin]    • Methadone    • Methotrexate Hives and Rash   • Methotrexate Derivatives    • Pseudoephedrine Vomiting   • Remicade [Infliximab Injection] Hives   • Roxanol [Morphine Sulfate]      Throat swells   • Sulfa Drugs Hives   • Sulfasalazine    • Tape Rash     Skin peels off; paper tape   • Tizanidine Hydrochloride    • Celestone [Betamethasone Sodium Phosphate]          Family History   Problem Relation Age of Onset   • Lung Disease Mother      copd   • Diabetes Father    • Heart Disease Father    • Diabetes Sister    • Cancer Maternal " Aunt      breast         Social History     Social History   • Marital status:      Spouse name: N/A   • Number of children: N/A   • Years of education: N/A     Occupational History   • Not on file.     Social History Main Topics   • Smoking status: Never Smoker   • Smokeless tobacco: Never Used      Comment: second hand smoke   • Alcohol use Yes      Comment: rare   • Drug use:       Comment: medical marijuana   • Sexual activity: Not on file      Comment:      Other Topics Concern   • Not on file     Social History Narrative   • No narrative on file         Physical Exam:  Ambulatory Vitals  Blood pressure 140/80, pulse 100, height 1.829 m (6'), weight 69.9 kg (154 lb), SpO2 98 %.   Oxygen Therapy:  Pulse Oximetry: 98 %  BP Readings from Last 4 Encounters:   12/26/17 140/80   12/14/17 142/84   12/06/17 145/70   11/21/17 118/74       Weight/BMI: Body mass index is 20.89 kg/m².  Wt Readings from Last 4 Encounters:   12/26/17 69.9 kg (154 lb)   12/14/17 69.9 kg (154 lb)   12/06/17 71.7 kg (158 lb)   11/21/17 71.7 kg (158 lb)         General: ill appearing, in moderate distress  Eyes: nl conjunctiva  ENT: OP clear  Neck: JVP 10 cm H2O, no carotid bruits  Lungs: normal respiratory effort, bibasilar crackles  Heart: RRR, 2/6 systolic murmur, no rubs or gallops, trace edema bilateral lower extremities. No LV/RV heave on cardiac palpatation. 2+ bilateral radial pulses.  2+ bilateral dp pulses.   Abdomen: soft, mildly ttp diffusely, non distended, no masses, normal bowel sounds.  No HSM.  Extremities/MSK: no clubbing, no cyanosis  Neurological: No focal sensory deficits  Psychiatric: Appropriate affect, A/O x 3  Skin: Warm extremities      Lab Data Review:  Lab Results   Component Value Date/Time    CHOLSTRLTOT 197 12/04/2015 09:02 AM     (H) 12/04/2015 09:02 AM    HDL 67 12/04/2015 09:02 AM    TRIGLYCERIDE 71 12/04/2015 09:02 AM       Lab Results   Component Value Date/Time    SODIUM 140 08/17/2017  07:10 AM    POTASSIUM 4.5 08/17/2017 07:10 AM    CHLORIDE 103 08/17/2017 07:10 AM    CO2 31 08/17/2017 07:10 AM    GLUCOSE 82 08/17/2017 07:10 AM    BUN 20 08/17/2017 07:10 AM    CREATININE 0.87 08/17/2017 07:10 AM    CREATININE 0.89 02/10/2010 04:04 PM    BUNCREATRAT 17 02/10/2010 04:04 PM    GLOMRATE >59 02/10/2010 04:04 PM     CrCl cannot be calculated (Patient's most recent lab result is older than the maximum 7 days allowed.).  Lab Results   Component Value Date/Time    ALKPHOSPHAT 151 (H) 08/17/2017 07:10 AM    ASTSGOT 49 (H) 08/17/2017 07:10 AM    ALTSGPT 51 (H) 08/17/2017 07:10 AM    TBILIRUBIN 0.6 08/17/2017 07:10 AM      Lab Results   Component Value Date/Time    WBC 6.6 08/17/2017 07:10 AM    WBC 7.9 02/10/2010 04:04 PM     Lab Results   Component Value Date/Time    HBA1C 5.3 08/27/2015 01:50 PM     No components found for: TROP      Cardiac Imaging and Procedures Review:    EKG dated 12/26/2017 : My personal interpretation is NSR, LAE, RsR' in V1/V2    Echo dated 2013:   FINDINGS  Left Ventricle  Normal left ventricular size, thickness, systolic function, and   diastolic function. Left ventricular ejection fraction is 55% to 60%.    Right Ventricle  The right ventricle was normal in size and function.      Right Atrium  The right atrium is normal in size.      Left Atrium  The left atrium is normal in size.      Mitral Valve  The mitral valve is structurally and functionally normal.    Aortic Valve  The aortic valve is structurally and functionally normal.    Tricuspid Valve  The tricuspid valve is structurally and functionally normal. Right   ventricular systolic pressure is estimated to be 14 mmHg.    Pulmonic Valve  The pulmonic valve is structurally and functionally normal.    Pericardium  Normal pericardium without effusion.      Aorta  The aortic root is normal.      CONCLUSIONS  Normal echocardiogram.       Nuclear Perfusion Imaging (2013):   RESULTS:  1.  There were no new ECG changes and no  arrhythmia.    2.  Heart rate did increase to 92 and the blood pressure negrito somewhat to   162/77 after an initial drop to 117 systolic but then returned to baseline.  3.  The raw data demonstrated no unexpected extracardiac isotope uptake,   fairly dense collection of isotope in the right mid to lower abdomen.  4.  Rest-stress image comparison reveals no perfusion abnormalities in any of   the multiple SPECT image panels.  5.  The gated wall motion study demonstrated low cardiac volumes with very   small end systolic volume.  The global contractility was normal, EF 74%.    Regional contractility also normal.     SUMMARY:  Lexiscan stress myocardial perfusion stress imaging with technetium   99m tetrofosmin demonstratin.  No infarct or ischemia.  2.  Normal global and hyperdynamic regional function, EF 74%.  3.  No ECG changes or arrhythmia.  4.  No prior for comparison.          Radiology test Review:  CXR:   The mediastinal and cardiac silhouette is unremarkable.    The pulmonary vascularity is within normal limits.    The lung parenchyma demonstrates no airspace process. Calcified granuloma in the right lateral mid hemithorax is unchanged..    There is no significant pleural effusion.    There is no visible pneumothorax.    There are no acute bony abnormalities.   Impression       1.  No evidence of acute cardiopulmonary process.            Medical Decision Makin. Atrial fibrillation, unspecified type (CMS-HCC)  Paroxysmal, on metoprolol prn.  Not on anticoagulation  -will discuss AC further next visit, cont metop prn  -holter monitor given severely high heart rates reported at home and worsening palpitations.     2. Other chest pain  Concerning for angina, worsening.  -advised ED visit, patient would like to avoid.  -start aspirin, chewable as tablets go out through colostomy  -start omeprazole  -ED precautions discussed  -echo, then will schedule stress test    3. Shortness of breath  -echo    4.  Essential (primary) hypertension   Adequately controlled as some episodes of severe hypotension at home.     5. Leg swelling  -has been taking lasix or torsemide prn  -check echo, ? HFpEF, recheck renal function, last labs showed eGFR>60.       Return in about 3 days (around 12/29/2017).      Harvey Monahan MD  [facility], [address]  [address] [address]  Phone: [phone]  Fax: [phone]

## 2017-12-27 ENCOUNTER — TELEPHONE (OUTPATIENT)
Dept: CARDIOLOGY | Facility: MEDICAL CENTER | Age: 62
End: 2017-12-27

## 2017-12-27 ENCOUNTER — APPOINTMENT (OUTPATIENT)
Dept: BEHAVIORAL HEALTH | Facility: PHYSICIAN GROUP | Age: 62
End: 2017-12-27
Payer: MEDICARE

## 2017-12-27 ENCOUNTER — HOSPITAL ENCOUNTER (OUTPATIENT)
Dept: CARDIOLOGY | Facility: MEDICAL CENTER | Age: 62
End: 2017-12-27
Attending: INTERNAL MEDICINE
Payer: MEDICARE

## 2017-12-27 ENCOUNTER — HOSPITAL ENCOUNTER (OUTPATIENT)
Dept: LAB | Facility: MEDICAL CENTER | Age: 62
End: 2017-12-27
Attending: INTERNAL MEDICINE
Payer: MEDICARE

## 2017-12-27 DIAGNOSIS — R06.02 SHORTNESS OF BREATH: ICD-10-CM

## 2017-12-27 DIAGNOSIS — I10 ESSENTIAL (PRIMARY) HYPERTENSION: ICD-10-CM

## 2017-12-27 DIAGNOSIS — R07.89 OTHER CHEST PAIN: ICD-10-CM

## 2017-12-27 LAB
ALBUMIN SERPL BCP-MCNC: 4.7 G/DL (ref 3.2–4.9)
ALBUMIN/GLOB SERPL: 1.2 G/DL
ALP SERPL-CCNC: 119 U/L (ref 30–99)
ALT SERPL-CCNC: 43 U/L (ref 2–50)
ANION GAP SERPL CALC-SCNC: 8 MMOL/L (ref 0–11.9)
AST SERPL-CCNC: 34 U/L (ref 12–45)
BILIRUB SERPL-MCNC: 0.7 MG/DL (ref 0.1–1.5)
BNP SERPL-MCNC: 14 PG/ML (ref 0–100)
BUN SERPL-MCNC: 19 MG/DL (ref 8–22)
CALCIUM SERPL-MCNC: 11.2 MG/DL (ref 8.5–10.5)
CHLORIDE SERPL-SCNC: 100 MMOL/L (ref 96–112)
CO2 SERPL-SCNC: 27 MMOL/L (ref 20–33)
CREAT SERPL-MCNC: 1.08 MG/DL (ref 0.5–1.4)
ERYTHROCYTE [DISTWIDTH] IN BLOOD BY AUTOMATED COUNT: 41.4 FL (ref 35.9–50)
GFR SERPL CREATININE-BSD FRML MDRD: 51 ML/MIN/1.73 M 2
GLOBULIN SER CALC-MCNC: 4 G/DL (ref 1.9–3.5)
GLUCOSE SERPL-MCNC: 103 MG/DL (ref 65–99)
HCT VFR BLD AUTO: 47.1 % (ref 37–47)
HGB BLD-MCNC: 15.3 G/DL (ref 12–16)
LV EJECT FRACT  99904: 60
LV EJECT FRACT MOD 2C 99903: 45.02
LV EJECT FRACT MOD 4C 99902: 60.77
LV EJECT FRACT MOD BP 99901: 57.25
MAGNESIUM SERPL-MCNC: 2.1 MG/DL (ref 1.5–2.5)
MCH RBC QN AUTO: 29.8 PG (ref 27–33)
MCHC RBC AUTO-ENTMCNC: 32.5 G/DL (ref 33.6–35)
MCV RBC AUTO: 91.8 FL (ref 81.4–97.8)
PLATELET # BLD AUTO: 274 K/UL (ref 164–446)
PMV BLD AUTO: 10.9 FL (ref 9–12.9)
POTASSIUM SERPL-SCNC: 4.4 MMOL/L (ref 3.6–5.5)
PROT SERPL-MCNC: 8.7 G/DL (ref 6–8.2)
RBC # BLD AUTO: 5.13 M/UL (ref 4.2–5.4)
SODIUM SERPL-SCNC: 135 MMOL/L (ref 135–145)
WBC # BLD AUTO: 8.2 K/UL (ref 4.8–10.8)

## 2017-12-27 PROCEDURE — 36415 COLL VENOUS BLD VENIPUNCTURE: CPT

## 2017-12-27 PROCEDURE — 85027 COMPLETE CBC AUTOMATED: CPT

## 2017-12-27 PROCEDURE — 93306 TTE W/DOPPLER COMPLETE: CPT

## 2017-12-27 PROCEDURE — 80053 COMPREHEN METABOLIC PANEL: CPT

## 2017-12-27 PROCEDURE — 93306 TTE W/DOPPLER COMPLETE: CPT | Mod: 26 | Performed by: INTERNAL MEDICINE

## 2017-12-27 PROCEDURE — 83735 ASSAY OF MAGNESIUM: CPT

## 2017-12-27 PROCEDURE — 83880 ASSAY OF NATRIURETIC PEPTIDE: CPT

## 2017-12-27 NOTE — TELEPHONE ENCOUNTER
Reviewed lab tests which showed stable renal function and no electrolyte abnormalities.  TTE showed normal biventricular function and no wall motion abnormalities.      Please call patient and let her know, I will see her Friday to discuss next steps, but no urgent new changes.    Thanks!

## 2017-12-27 NOTE — BH THERAPY
[facility]  Therapy Progress Note    Patient Name: Jana Overton  Patient MRN: [id number]  Today's Date: 12/22/2017     Type of session:Individual psychotherapy  Length of session: 45 minutes  Persons in attendance:Patient    Subjective/New Info: Client arrived on time for individual therapy appointment. She reports that she has continued to struggle with not feeling well and she has an appointment with a cardiologist next week. Client discussed her anxiety about going to the emergency room due to several past negative experiences. Client reports that she became upset when she logged in to [employer] and saw that writer had diagnosed her with an anxiety disorder. Writer and client discussed the reason for the diagnosis as well as the need for diagnoses for insurance purposes. Client discussed her frustration at her continued trouble sleeping. Writer provided psychoeducation regarding sleep hygiene and the importance of quality sleep.     Objective/Observations:   Participation: Active verbal participation   Grooming: Casual   Cognition: Alert   Eye contact: Good   Mood: Depressed   Affect: Flexible and Full range   Thought process: Logical and Goal-directed   Speech: Rate within normal limits and Volume within normal limits   Other:     Diagnoses:   1. 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 Not Indicated   If evidence of imminent risk is present, intervention/plan:     Therapeutic Intervention(s): Pain addressed and Supportive psychotherapy    Treatment Goal(s)/Objective(s) addressed: Reduce client's symptoms of anxiety     Progress toward Treatment Goals: Mild improvement    Plan:  - Next appointment scheduled:  12/27/2017    Fozia Clarke, Ph.D.  12/22/2017

## 2017-12-27 NOTE — PATIENT INSTRUCTIONS
Please blood tests non-fasting at your earliest convenience.    Please schedule the heart ultrasound as soon as possible.     Please start aspirin 81mg PO daily and omeprazole 20mg by mouth daily.

## 2017-12-29 ENCOUNTER — OFFICE VISIT (OUTPATIENT)
Dept: CARDIOLOGY | Facility: MEDICAL CENTER | Age: 62
End: 2017-12-29
Payer: MEDICARE

## 2017-12-29 ENCOUNTER — TELEPHONE (OUTPATIENT)
Dept: INTERNAL MEDICINE | Facility: IMAGING CENTER | Age: 62
End: 2017-12-29

## 2017-12-29 VITALS
HEIGHT: 72 IN | SYSTOLIC BLOOD PRESSURE: 140 MMHG | OXYGEN SATURATION: 96 % | DIASTOLIC BLOOD PRESSURE: 80 MMHG | BODY MASS INDEX: 20.59 KG/M2 | HEART RATE: 108 BPM | WEIGHT: 152 LBS

## 2017-12-29 DIAGNOSIS — I48.0 PAROXYSMAL ATRIAL FIBRILLATION (HCC): ICD-10-CM

## 2017-12-29 PROCEDURE — 99215 OFFICE O/P EST HI 40 MIN: CPT | Performed by: INTERNAL MEDICINE

## 2017-12-29 ASSESSMENT — ENCOUNTER SYMPTOMS
FEVER: 0
NIGHT SWEATS: 0
SHORTNESS OF BREATH: 1
CHILLS: 1
LIGHT-HEADEDNESS: 1
HEMATOCHEZIA: 0
COUGH: 0
HEMATEMESIS: 0

## 2017-12-30 NOTE — TELEPHONE ENCOUNTER
All set. Does she need medication also---if so what and I will send to her pharmacy.  I do not think it comes with med.

## 2017-12-30 NOTE — PROGRESS NOTES
Cardiology Follow-up Consultation Note    Date of note:    12/29/2017    Primary Care Provider: Amanda Bazzi M.D.    Name:             Jana Overton   YOB: 1955  MRN:               [id number]    CC: palpitations.     Patient ID/HPI:   Jana Overton is a 62 y.o. female whose current medical problems include hypertension,  DVT, parosxysmal atrial fibrillation, TRUDY, COPD, and crohn's disease who is here for follow-up.    Last clinic visit: 12/27/2017, at that time:    Over the last 3 months she has had worsening palpitations and shortness of breath.  She takes lasix only twice a month.  She limits her salt intake.       She also has moderate severity left sided chest tightness which radiates down her left arm. This is not necessarily associated with exertion.  She has been told to go to the emergency room multiple times for evaluation, however she continues to refuse as she's scared of getting inadequate treatment there.     SBP at home occasionally down to 70s/40s, HR up to 160s. + LE edema     She is currently not taking metoprolol or lasix. She does not take aspirin daily as it causes severe burning in her stomach.  Does see a pain specialist Dr. Telles and has been working on down-titrating opiates.       Interim History:  Echocardiogram was normal.  Labs normal except eGFR of 51, slightly worse than 3 months ago.     Still having palpitations multiple times a day, occurs with activity or during a shower.      Her blood pressure at home gets down to 80s-90s/50s at times.     She got fitted for her CPAP machine today and felt much better on that machine.     She took torsemide this morning for swelling and got leg swelling, she did not need it otherwise since our last appointment.     Diagnosis of atrial fibrillation was 10-14 years ago at [facility].     She still has chest pain, occasionally pressure-like at rest, resolves within minutes, also has sharp chest pain at times, associated with  movement.       Review of Systems   Constitution: Positive for chills. Negative for fever and night sweats.   HENT: Positive for congestion.    Cardiovascular: Positive for chest pain.   Respiratory: Positive for shortness of breath. Negative for cough.    Gastrointestinal: Negative for hematemesis, hematochezia and melena.   Genitourinary: Negative for dysuria and hematuria.   Neurological: Positive for light-headedness.         Past Medical History:   Diagnosis Date   • Anesthesia     difficult Iv stick   • Anxiety    • Arthritis     all over   • ASTHMA    • Atrial fib/flut    • Backpain    • Breath shortness    • Bronchitis     5 years   • Chronic pain    • Colostomy in place (CMS-HCC) 10/14/2010   • COPD (chronic obstructive pulmonary disease) (CMS-HCC) 6/24/2014   • COPD (chronic obstructive pulmonary disease) (CMS-HCC) 6/24/2014   • Crohn's disease of colon (CMS-HCC)    • Dyspnea    • History of cardiac murmur    • Hypokalemia 6/24/2014   • Indigestion    • Infectious disease     MRSA, VancoRSA   • Multiple falls 6/24/2014   • Narcotic dependence (CMS-HCC) 9/23/2009   • Obstruction    • Other specified disorder of intestines     crohns disease   • Pain 7/11/13    all over   • Pneumonia     child and mid 30's   • Postherpetic neuralgia 6/24/2014   • Rosacea 6/24/2014   • Sleep apnea          Past Surgical History:   Procedure Laterality Date   • ILEOSTOMY  5/14/2014    Performed by Kevin Cruz M.D. at SURGERY [facility] ORS   • MAMMOPLASTY REDUCTION  7/17/2013    Performed by Janey Burt M.D. at SURGERY [facility] ORS   • HARDWARE REMOVAL NEURO  7/16/2012    Performed by KEELEY KIM at SURGERY [facility] ORS   • GASTROSCOPY  10/4/2011    Performed by TYSON MARTINEZ at SURGERY SAME DAY [facility] ORS   • COLONOSCOPY  10/4/2011    Performed by TYSON MARTINEZ at SURGERY SAME DAY [facility] ORS   • DILATION AND CURETTAGE  9/24/2010    Performed by GAURAV ALY at SURGERY SAME DAY  [facility]S   • ILEOSTOMY  11/11/2009    Performed by SYDNEE AUGUSTINE at SURGERY [facility] ORS   • GASTROSCOPY WITH BIOPSY  11/22/08    Performed by NEGRITA HUYNH at SURGERY [facility] ORS   • ABDOMINAL EXPLORATION     • CERVICAL DISK AND FUSION ANTERIOR     • COLON RESECTION     • FOOT SURGERY     • HAND SURGERY     • LUMBAR FUSION ANTERIOR     • LUMPECTOMY     • OTHER ABDOMINAL SURGERY      surgery for chrons disease   • PB REDUCTION OF LARGE BREAST           Current Outpatient Prescriptions   Medication Sig Dispense Refill   • aspirin (ASA) 81 MG Chew Tab chewable tablet Take 1 Tab by mouth every day. 100 Tab 1   • omeprazole (PRILOSEC) 20 MG delayed-release capsule Take 1 Cap by mouth every day. 30 Cap 1   • metoprolol (LOPRESSOR) 25 MG Tab TAKE 1 TABLET BY MOUTH THREE TIMES DAILY AS NEEDED FOR PALPITATIONS 60 Tab 1   • spironolactone (ALDACTONE) 25 MG Tab TAKE 1 TABLET BY MOUTH TWICE DAILY 60 Tab 3   • potassium chloride (MICRO-K) 10 MEQ capsule Take 2 Caps by mouth 1 time daily as needed. WITH LASIX ONLY 60 Cap 2   • ipratropium-albuterol (DUONEB) 0.5-2.5 (3) MG/3ML nebulizer solution 3 mL by Nebulization route 4 times a day. 75 mL 2   • albuterol 108 (90 BASE) MCG/ACT Aero Soln inhalation aerosol Inhale 2 Puffs by mouth every 6 hours as needed for Shortness of Breath. 1 Inhaler 1   • Probiotic Product (PROBIOTIC DAILY) CAPS Take 1 Cap by mouth every day.     • oxycodone 20 MG/ML CONC Take 30 mg by mouth every four hours as needed. 20mg/ml solution     • torsemide (DEMADEX) 10 MG tablet Take 1 Tab by mouth 1 time daily as needed. Prn swelling     • azelastine (OPTIVAR) 0.05 % ophthalmic solution   5   • azelastine (ASTELIN) 137 MCG/SPRAY nasal spray USE 2 SPRAYS IEN BID. STOP IF NOSE BLEED OCCURS  5   • fluconazole (DIFLUCAN) 150 MG tablet TK 1 T PO QD FOR 3 DAYS  0   • albuterol (VENTOLIN HFA) 108 (90 Base) MCG/ACT Aero Soln inhalation aerosol Inhale 2 Puffs by mouth every 6 hours as needed for Shortness  "of Breath.     • Brimonidine Tartrate (MIRVASO) 0.33 % Gel by Apply externally route.     • granisetron (KYTRIL) 1 MG Tab Take 1 Tab by mouth every 12 hours as needed for Nausea/Vomiting. 30 Tab 1   • diazepam (VALIUM) 5 MG Tab TAKE 1/2 TO 1 TABLET BY MOUTH EVERY 6 HOURS AS NEEDED FOR MUSCLE SPASM 120 Tab 0   • ondansetron (ZOFRAN ODT) 4 MG TABLET DISPERSIBLE Take 2 Tabs by mouth every 8 hours as needed. 30 Tab 3   • nystatin (MYCOSTATIN) 280063 UNIT/ML Suspension Take 5 mL by mouth 4 times a day. (Patient taking differently: Take 500,000 Units by mouth as needed.) 120 mL 1   • cyanocobalamin (VITAMIN B-12) 1000 MCG/ML Solution INJECT 1 ML IN THE MUSCLE EVERY 30 DAYS 10 mL 1   • acyclovir (ZOVIRAX) 200 MG/5ML Suspension SHAKE LQ AND TK 10 ML PO BID FOR 7 DAYS  0   • fluticasone (FLONASE) 50 MCG/ACT nasal spray Spray 1-2 Sprays in nose 1 time daily as needed. Indications: Hayfever 1 Bottle 3   • ipratropium (ATROVENT) 0.03 % Solution Spray 2 Sprays in nose 2 times a day. 1 Bottle 2   • estradiol (ESTRACE) 0.1 MG/GM vaginal cream Insert 0.5g vaginally three times each week. 1 Tube 6   • BD INTEGRA SYRINGE 25G X 1\" 3 ML Misc USE 1 SYRINGE AS DIRECTED EVERY 30 DAYS 11 Each 0   • lidocaine (LIDODERM) 5 % PTCH   1   • cyanocobalamin (VITAMIN B-12) 1000 MCG/ML SOLN 1 mL by Intramuscular route every 30 days. 1 mL 0   • cetirizine (ZYRTEC) 10 MG TABS Take 10 mg by mouth 1 time daily as needed. For allergies       Current Facility-Administered Medications   Medication Dose Route Frequency Provider Last Rate Last Dose   • cyanocobalamin (VITAMIN B-12) injection 1,000 mcg  1,000 mcg Intramuscular Q30 DAYS Amanda Bazzi M.D.   1,000 mcg at 05/03/17 1452         Allergies   Allergen Reactions   • Morphine Swelling   • Ativan [Lorazepam]      States give me nightmares.    • Azathioprine Sodium Hives   • Demerol Vomiting   • Elavil [Amitriptyline]      Nightmares     • Fentanyl      Patches caused skin breakdown, no pain relief "   • Kdc:Fentanyl    • Lyrica [Pregabalin]    • Methadone    • Methotrexate Hives and Rash   • Methotrexate Derivatives    • Pseudoephedrine Vomiting   • Remicade [Infliximab Injection] Hives   • Roxanol [Morphine Sulfate]      Throat swells   • Sulfa Drugs Hives   • Sulfasalazine    • Tape Rash     Skin peels off; paper tape   • Tizanidine Hydrochloride    • Celestone [Betamethasone Sodium Phosphate]          Family History   Problem Relation Age of Onset   • Lung Disease Mother      copd   • Diabetes Father    • Heart Disease Father    • Diabetes Sister    • Cancer Maternal Aunt      breast         Social History     Social History   • Marital status:      Spouse name: N/A   • Number of children: N/A   • Years of education: N/A     Occupational History   • Not on file.     Social History Main Topics   • Smoking status: Never Smoker   • Smokeless tobacco: Never Used      Comment: second hand smoke   • Alcohol use Yes      Comment: rare   • Drug use:       Comment: medical marijuana   • Sexual activity: Not on file      Comment:      Other Topics Concern   • Not on file     Social History Narrative   • No narrative on file         Physical Exam:  Ambulatory Vitals  Blood pressure 140/80, pulse (!) 108, height 1.829 m (6'), weight 68.9 kg (152 lb), SpO2 96 %.   Oxygen Therapy:  Pulse Oximetry: 96 %  BP Readings from Last 4 Encounters:   12/29/17 140/80   12/26/17 140/80   12/14/17 142/84   12/06/17 145/70       Weight/BMI: Body mass index is 20.61 kg/m².  Wt Readings from Last 4 Encounters:   12/29/17 68.9 kg (152 lb)   12/26/17 69.9 kg (154 lb)   12/14/17 69.9 kg (154 lb)   12/06/17 71.7 kg (158 lb)     General: ill appearing, in much less distress than last visit.   Eyes: nl conjunctiva  ENT: OP clear  Neck: JVP <8 cm H2O, no carotid bruits  Lungs: normal respiratory effort, bibasilar crackles  Heart: RRR, 2/6 systolic murmur, no rubs or gallops, trace edema bilateral lower extremities. No LV/RV heave  on cardiac palpatation. 2+ bilateral radial pulses.  2+ bilateral dp pulses.   Abdomen: soft, mildly ttp diffusely, non distended, no masses, normal bowel sounds.  No HSM.  Extremities/MSK: no clubbing, no cyanosis  Neurological: No focal sensory deficits  Psychiatric: Appropriate affect, A/O x 3  Skin: Warm extremities      Lab Data Review:  Lab Results   Component Value Date/Time    CHOLSTRLTOT 197 12/04/2015 09:02 AM     (H) 12/04/2015 09:02 AM    HDL 67 12/04/2015 09:02 AM    TRIGLYCERIDE 71 12/04/2015 09:02 AM       Lab Results   Component Value Date/Time    SODIUM 135 12/27/2017 08:40 AM    POTASSIUM 4.4 12/27/2017 08:40 AM    CHLORIDE 100 12/27/2017 08:40 AM    CO2 27 12/27/2017 08:40 AM    GLUCOSE 103 (H) 12/27/2017 08:40 AM    BUN 19 12/27/2017 08:40 AM    CREATININE 1.08 12/27/2017 08:40 AM    CREATININE 0.89 02/10/2010 04:04 PM    BUNCREATRAT 17 02/10/2010 04:04 PM    GLOMRATE >59 02/10/2010 04:04 PM     Lab Results   Component Value Date/Time    ALKPHOSPHAT 119 (H) 12/27/2017 08:40 AM    ASTSGOT 34 12/27/2017 08:40 AM    ALTSGPT 43 12/27/2017 08:40 AM    TBILIRUBIN 0.7 12/27/2017 08:40 AM      Lab Results   Component Value Date/Time    WBC 8.2 12/27/2017 08:40 AM    WBC 7.9 02/10/2010 04:04 PM     Lab Results   Component Value Date/Time    HBA1C 5.3 08/27/2015 01:50 PM     No components found for: TROP      Cardiac Imaging and Procedures Review:    EKG dated 12/26/2017 : My personal interpretation is NSR, LAE, RsR' in V1/V2     Echo dated 2013:   FINDINGS  Left Ventricle  Normal left ventricular size, thickness, systolic function, and   diastolic function. Left ventricular ejection fraction is 55% to 60%.    Right Ventricle  The right ventricle was normal in size and function.      Right Atrium  The right atrium is normal in size.      Left Atrium  The left atrium is normal in size.      Mitral Valve  The mitral valve is structurally and functionally normal.    Aortic Valve  The aortic valve is  structurally and functionally normal.    Tricuspid Valve  The tricuspid valve is structurally and functionally normal. Right   ventricular systolic pressure is estimated to be 14 mmHg.    Pulmonic Valve  The pulmonic valve is structurally and functionally normal.    Pericardium  Normal pericardium without effusion.      Aorta  The aortic root is normal.      CONCLUSIONS  Normal echocardiogram.        TTE (2017):  CONCLUSIONS  1. Normal right and left ventricular size and function. Normal regional wall motion  2. Abnormal septal motion consistent with RV volume overload, however estimated right atrial pressure by IVC assessment is normal  3. No significant valve disease or flow abnormalities.   4. Unable to estimate pulmonary artery pressure due to an inadequate tricuspid regurgitant jet.    Compared to the images of the study done 4/3/13 - there has been no significant change.      Nuclear Perfusion Imaging ():   RESULTS:  1.  There were no new ECG changes and no arrhythmia.    2.  Heart rate did increase to 92 and the blood pressure negrito somewhat to   162/77 after an initial drop to 117 systolic but then returned to baseline.  3.  The raw data demonstrated no unexpected extracardiac isotope uptake,   fairly dense collection of isotope in the right mid to lower abdomen.  4.  Rest-stress image comparison reveals no perfusion abnormalities in any of   the multiple SPECT image panels.  5.  The gated wall motion study demonstrated low cardiac volumes with very   small end systolic volume.  The global contractility was normal, EF 74%.    Regional contractility also normal.     SUMMARY:  Lexiscan stress myocardial perfusion stress imaging with technetium   99m tetrofosmin demonstratin.  No infarct or ischemia.  2.  Normal global and hyperdynamic regional function, EF 74%.  3.  No ECG changes or arrhythmia.  4.  No prior for comparison.              Radiology test Review:  CXR:   The mediastinal and cardiac  silhouette is unremarkable.    The pulmonary vascularity is within normal limits.    The lung parenchyma demonstrates no airspace process. Calcified granuloma in the right lateral mid hemithorax is unchanged..    There is no significant pleural effusion.    There is no visible pneumothorax.    There are no acute bony abnormalities.   Impression        1.  No evidence of acute cardiopulmonary process.               Medical Decision Makin. Atrial fibrillation, unspecified type (CMS-HCC)  Paroxysmal, symptomatic.  I have not seen confirmation of this diagnosis.  Not on anticoagulation as she is concerned for bleeding due to her crohns disease.  -continue aspirin for AC for now  -start metoprolol 25mg PO bid given symptoms  -ziopatch to confirm diagnosis and guide rate control.      2. Other chest pain  Concerning for angina, worsening.  -advised ED visit, as symptoms are improving, patient would still like to avoid,   -continue chewable aspirin as tablets go out through colostomy  -recommended omeprazole, however she has severe burning abdominal pain with this, will discuss ranitidine or sucrulfate next visit.   -will discuss stress test next visit, ED precautions discussed.      3. Essential (primary) hypertension   Adequately controlled , now with some hypotension at home, not symptomatic.  -CTM    4. Leg swelling  -continue torsemide/potassium prn  -will use CPAP, I believe this will help most of her symptoms.        Return in about 3 weeks (around 2018).      Harvey Monahan MD  [facility]  [facility], [address]  [address] [address]  Phone: [phone]  Fax: [phone]

## 2017-12-30 NOTE — PATIENT INSTRUCTIONS
Please start taking metoprolol 25mg by mouth twice a day, hold for blood pressure <95/50 or if you have symptoms of lightheadedness.

## 2017-12-30 NOTE — TELEPHONE ENCOUNTER
----- Message from Dianne Sandoval R.N. sent at 12/29/2017  3:58 PM PST -----  Please write a script for new nebulizer tubing/mask supplies and then I will send it to [facility].    M

## 2018-01-02 ENCOUNTER — OFFICE VISIT (OUTPATIENT)
Dept: BEHAVIORAL HEALTH | Facility: PHYSICIAN GROUP | Age: 63
End: 2018-01-02
Payer: MEDICARE

## 2018-01-02 DIAGNOSIS — F06.8 ANXIETY DISORDER DUE TO MULTIPLE MEDICAL PROBLEMS: ICD-10-CM

## 2018-01-02 PROCEDURE — 90834 PSYTX W PT 45 MINUTES: CPT | Performed by: PSYCHOLOGIST

## 2018-01-03 ENCOUNTER — TELEPHONE (OUTPATIENT)
Dept: CARDIOLOGY | Facility: MEDICAL CENTER | Age: 63
End: 2018-01-03

## 2018-01-03 ENCOUNTER — NON-PROVIDER VISIT (OUTPATIENT)
Dept: CARDIOLOGY | Facility: MEDICAL CENTER | Age: 63
End: 2018-01-03
Attending: INTERNAL MEDICINE
Payer: MEDICARE

## 2018-01-03 DIAGNOSIS — I48.0 PAROXYSMAL ATRIAL FIBRILLATION (HCC): ICD-10-CM

## 2018-01-03 DIAGNOSIS — I47.10 SVT (SUPRAVENTRICULAR TACHYCARDIA): ICD-10-CM

## 2018-01-03 NOTE — BH THERAPY
[facility]  Therapy Progress Note    Patient Name: Jana Overton  Patient MRN: [id number]  Today's Date: 1/2/2018     Type of session:Individual psychotherapy  Length of session: 55 minutes  Persons in attendance:Patient    Subjective/New Info: Client arrived on time for individual therapy appointment. She reports that she has been feeling better since previous visit. Client discussed starting to use her CPAP machine and getting 7 hours of sleep the first night. Writer and client discussed client's continued health difficulties and explored ways in which client can continue to improve her quality of life. Writer provided psychoeducation on the importance of social interaction to mental health and brain health and encouraged client to increase her level of social interaction. Client discussed her desire to move away from [place] due to the negative impact living here has on her health.     Objective/Observations:   Participation: Active verbal participation   Grooming: Casual   Cognition: Alert   Eye contact: Good   Mood: Depressed   Affect: Flexible   Thought process: Logical and Goal-directed   Speech: Rate within normal limits and Volume within normal limits   Other:     Diagnoses:   1. 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 Not Indicated   If evidence of imminent risk is present, intervention/plan:     Therapeutic Intervention(s): Leisure and recreation skills, Socialization and Supportive psychotherapy    Treatment Goal(s)/Objective(s) addressed: Reduce client's symptoms of anxiety     Progress toward Treatment Goals: Moderate improvement    Plan:  - Next appointment scheduled:  1/25/2018    Fozia Clarke, Ph.D.  1/2/2018

## 2018-01-11 ENCOUNTER — NON-PROVIDER VISIT (OUTPATIENT)
Dept: INTERNAL MEDICINE | Facility: IMAGING CENTER | Age: 63
End: 2018-01-11
Payer: MEDICARE

## 2018-01-11 DIAGNOSIS — E53.8 B12 DEFICIENCY: ICD-10-CM

## 2018-01-11 PROCEDURE — 96372 THER/PROPH/DIAG INJ SC/IM: CPT | Performed by: FAMILY MEDICINE

## 2018-01-11 RX ADMIN — CYANOCOBALAMIN 1000 MCG: 1000 INJECTION, SOLUTION INTRAMUSCULAR; SUBCUTANEOUS at 15:52

## 2018-01-16 ENCOUNTER — APPOINTMENT (RX ONLY)
Dept: URBAN - METROPOLITAN AREA CLINIC 20 | Facility: CLINIC | Age: 63
Setting detail: DERMATOLOGY
End: 2018-01-16

## 2018-01-16 DIAGNOSIS — L57.8 OTHER SKIN CHANGES DUE TO CHRONIC EXPOSURE TO NONIONIZING RADIATION: ICD-10-CM

## 2018-01-16 DIAGNOSIS — L57.0 ACTINIC KERATOSIS: ICD-10-CM

## 2018-01-16 PROCEDURE — ? COUNSELING

## 2018-01-16 PROCEDURE — ? SUNSCREEN RECOMMENDATIONS

## 2018-01-16 PROCEDURE — 96574 DBRDMT PRMLG LES W/PDT: CPT

## 2018-01-16 PROCEDURE — 99212 OFFICE O/P EST SF 10 MIN: CPT | Mod: 25

## 2018-01-16 PROCEDURE — ? PHOTODYNAMIC THERAPY COUNSELING

## 2018-01-16 PROCEDURE — ? ORDER FOR PHOTODYNAMIC THERAPY

## 2018-01-16 PROCEDURE — ? PDT: BLUE

## 2018-01-16 ASSESSMENT — LOCATION SIMPLE DESCRIPTION DERM: LOCATION SIMPLE: LEFT LIP

## 2018-01-16 ASSESSMENT — LOCATION DETAILED DESCRIPTION DERM: LOCATION DETAILED: LEFT UPPER CUTANEOUS LIP

## 2018-01-16 ASSESSMENT — LOCATION ZONE DERM: LOCATION ZONE: LIP

## 2018-01-16 NOTE — PROCEDURE: ORDER FOR PHOTODYNAMIC THERAPY
Location: Face
Frequency Of Pdt: Single Treatment
Scalp Incubation Time: 2 Hours
Pdt Type: Blue Light
Neck Incubation Time: 1 Hour
Consent: The procedure and risks were reviewed with the patient including but not limited to: burning, pigmentary changes, pain, blistering, scabbing, redness, and the possibility of needing numerous treatments. Strict photoprotection after the procedure was also discussed. Advised pt that lesions that do not clear with PDT will need to be followed closely. Follow up at one week for immediate post PDT and in three months for continued surveillance.
Face And Scalp Incubation Time: 1 Hour for the face and 2 Hours for the scalp
Detail Level: Simple

## 2018-01-16 NOTE — PROCEDURE: PDT: BLUE
Light Source: 415nm
Incubation Time: 01:00:00
Was Levulan/Ameluz Applied On A Previous Day?: No
Post-Care Instructions: I reviewed with the patient in detail post-care instructions. Patient is to avoid sunlight for the next 2 days, and wear sun protection. Patients may expect sunburn like redness, discomfort and scabbing.
Pre-Procedure Text: The treatment areas were cleaned and prepped in the usual fashion. the area was curretted prior to levulan application
Total Number Of Aks Treated (Optional To Report): 0
Detail Level: Zone
Treatment Number: 2
Illumination Time: 2 hour
Which Photosensitizer Was Used: Levulan
Number Of Kerasticks/Tubes Billed For: 1
Consent: Written consent obtained.  The risks were reviewed with the patient including but not limited to: pigmentary changes, pain, blistering, scabbing, redness, and the remote possibility of scarring.
Anesthesia Type: 1% lidocaine with epinephrine
Debridement Text (Will Only Render In Visit Note If You Select Debridement Option Under Who Performed The Pdt Field): Prior to application of the photodynamic medication the hyperkeratotic lesions were curetted to make them more amenable to therapy.
Who Performed The Pdt?: Performed by MD, FABIANA or NP with Pre-Procedure Debridement of Hyperkeratotic Lesions ([id number])

## 2018-01-16 NOTE — HPI: PHOTODYNAMIC THERAPY (PDT)
Have You Had Previous Treatments With Pdt Before?: has had previous treatments
When Outside In The Sun, Do You...: mostly burns, rarely tans
Number Of Treatments: 1

## 2018-01-24 ENCOUNTER — TELEPHONE (OUTPATIENT)
Dept: CARDIOLOGY | Facility: MEDICAL CENTER | Age: 63
End: 2018-01-24

## 2018-01-24 ENCOUNTER — OFFICE VISIT (OUTPATIENT)
Dept: CARDIOLOGY | Facility: MEDICAL CENTER | Age: 63
End: 2018-01-24
Payer: MEDICARE

## 2018-01-24 VITALS
HEIGHT: 72 IN | BODY MASS INDEX: 20.99 KG/M2 | OXYGEN SATURATION: 91 % | DIASTOLIC BLOOD PRESSURE: 80 MMHG | SYSTOLIC BLOOD PRESSURE: 160 MMHG | HEART RATE: 108 BPM | WEIGHT: 155 LBS

## 2018-01-24 DIAGNOSIS — I20.89 ANGINA EFFORT: ICD-10-CM

## 2018-01-24 DIAGNOSIS — R07.89 OTHER CHEST PAIN: ICD-10-CM

## 2018-01-24 DIAGNOSIS — R10.9 STOMACH PAIN: ICD-10-CM

## 2018-01-24 DIAGNOSIS — G47.30 SLEEP APNEA, UNSPECIFIED TYPE: ICD-10-CM

## 2018-01-24 DIAGNOSIS — I48.0 PAROXYSMAL ATRIAL FIBRILLATION (HCC): ICD-10-CM

## 2018-01-24 PROCEDURE — 0296T PR EXT ECG > 48HR TO 21 DAY RCRD W/CONECT INTL RCRD: CPT | Performed by: INTERNAL MEDICINE

## 2018-01-24 PROCEDURE — 0298T PR EXT ECG > 48HR TO 21 DAY REVIEW AND INTERPRETATN: CPT | Performed by: INTERNAL MEDICINE

## 2018-01-24 PROCEDURE — 99214 OFFICE O/P EST MOD 30 MIN: CPT | Performed by: INTERNAL MEDICINE

## 2018-01-24 RX ORDER — SUCRALFATE 1 G/1
1 TABLET ORAL 2 TIMES DAILY
Qty: 60 TAB | Refills: 3 | Status: SHIPPED | OUTPATIENT
Start: 2018-01-24 | End: 2018-02-02

## 2018-01-24 ASSESSMENT — ENCOUNTER SYMPTOMS
SHORTNESS OF BREATH: 1
DIZZINESS: 1
CLAUDICATION: 1
ORTHOPNEA: 1
DIAPHORESIS: 1
WEAKNESS: 1

## 2018-01-24 NOTE — TELEPHONE ENCOUNTER
----- Message from Isabel Park sent at 1/24/2018  7:36 AM PST -----  Regarding: Patient wants call back asap about today's appt  FELICITA/Amanda    Patient wants a call back asap and can be reached at [phone]. She has an appt this morning at 10:40am with Dr Monahan and wants to make sure that he has received the information about her Zio Patch. She said that it's useless to come to the appt if the doctor doesn't have the information.    ================================================================================    Called pt back, s/w , informed him that we have the zio patch and it is already forwarded to Dr Monahan,  verbalizes understanding

## 2018-01-24 NOTE — PROGRESS NOTES
Cardiology Follow-up Consultation Note    Date of note:    1/24/2018    Primary Care Provider: Amanda Bazzi M.D.    Name:             Jana Overton   YOB: 1955  MRN:               [id number]    CC: palpitations.      Patient ID/HPI:   Jana Overton is a 62 y.o. female whose current medical problems include hypertension,  DVT, parosxysmal atrial fibrillation, TRUDY, COPD, and crohn's disease who is here for follow-up.     Clinic visit: 12/27/2017, at that time:     She also has moderate severity left sided chest tightness which radiates down her left arm. This is not necessarily associated with exertion.  She has been told to go to the emergency room multiple times for evaluation, however she continues to refuse as she's scared of getting inadequate treatment there.     SBP at home occasionally down to 70s/40s, HR up to 160s. + LE edema     She is currently not taking metoprolol or lasix. She does not take aspirin daily as it causes severe burning in her stomach.    Does see a pain specialist Dr. Telles and has been working on down-titrating opiates.     Last clinic visit: 12/29/2017  She still has chest pain, occasionally pressure-like at rest, resolves within minutes, also has sharp chest pain at times, associated with movement.     Interim History:  Pulse does go very high with exercise up to 140s with very mild exercise. Ziopatch showed periodic SVT and likely atrial fibrillation.  Still having palpitation daily.  No passing out, no falls.     In terms of TRUDY, she started her CPAP and feels much better in terms of her energy level and concentration.     In terms of her hypertension, 90s-100s/60s. Highest is 140s/90s.     Taking torsemide once a week along with potassium.     Still having moderate severity right sided chest pain associated with palpitations and ear pounding.     Review of Systems   Constitution: Positive for diaphoresis, weakness and malaise/fatigue.   Cardiovascular:  Positive for chest pain, claudication, leg swelling and orthopnea.   Respiratory: Positive for shortness of breath.    Neurological: Positive for dizziness.       All other systems reviewed and discussed using a comprehensive questionnaire which has been scanned into [facility] as part of this appointment.          Past Medical History:   Diagnosis Date   • Anesthesia     difficult Iv stick   • Anxiety    • Arthritis     all over   • ASTHMA    • Atrial fib/flut    • Backpain    • Breath shortness    • Bronchitis     5 years   • Chronic pain    • Colostomy in place (CMS-HCC) 10/14/2010   • COPD (chronic obstructive pulmonary disease) (CMS-HCC) 6/24/2014   • COPD (chronic obstructive pulmonary disease) (CMS-HCC) 6/24/2014   • Crohn's disease of colon (CMS-HCC)    • Dyspnea    • History of cardiac murmur    • Hypokalemia 6/24/2014   • Indigestion    • Infectious disease     MRSA, VancoRSA   • Multiple falls 6/24/2014   • Narcotic dependence (CMS-HCC) 9/23/2009   • Obstruction    • Other specified disorder of intestines     crohns disease   • Pain 7/11/13    all over   • Pneumonia     child and mid 30's   • Postherpetic neuralgia 6/24/2014   • Rosacea 6/24/2014   • Sleep apnea          Past Surgical History:   Procedure Laterality Date   • ILEOSTOMY  5/14/2014    Performed by Kevin Cruz M.D. at SURGERY [facility] ORS   • MAMMOPLASTY REDUCTION  7/17/2013    Performed by Janey Burt M.D. at SURGERY [facility] ORS   • HARDWARE REMOVAL NEURO  7/16/2012    Performed by KEELEY KIM at SURGERY [facility] ORS   • GASTROSCOPY  10/4/2011    Performed by TYSON MARTINEZ at SURGERY SAME DAY [facility] ORS   • COLONOSCOPY  10/4/2011    Performed by TYSON MARTINEZ at SURGERY SAME DAY [facility] ORS   • DILATION AND CURETTAGE  9/24/2010    Performed by GAURAV ALY at SURGERY SAME DAY [facility] ORS   • ILEOSTOMY  11/11/2009    Performed by KEVIN CRUZ at SURGERY [facility] ORS   • GASTROSCOPY WITH BIOPSY   11/22/08    Performed by NEGRITA HUYNH at SURGERY [facility] ORS   • ABDOMINAL EXPLORATION     • CERVICAL DISK AND FUSION ANTERIOR     • COLON RESECTION     • FOOT SURGERY     • HAND SURGERY     • LUMBAR FUSION ANTERIOR     • LUMPECTOMY     • OTHER ABDOMINAL SURGERY      surgery for chrons disease   • PB REDUCTION OF LARGE BREAST           Current Outpatient Prescriptions   Medication Sig Dispense Refill   • Magnesium 100 MG Tab Take  by mouth every 48 hours.     • MAGNESIUM CITRATE PO Take  by mouth every 48 hours.     • metoprolol (LOPRESSOR) 25 MG Tab Take 1 Tab by mouth 2 times a day. Hold if your blood pressure is less than 95/50. 60 Tab 1   • aspirin (ASA) 81 MG Chew Tab chewable tablet Take 1 Tab by mouth every day. 100 Tab 1   • spironolactone (ALDACTONE) 25 MG Tab TAKE 1 TABLET BY MOUTH TWICE DAILY 60 Tab 3   • Probiotic Product (PROBIOTIC DAILY) CAPS Take 1 Cap by mouth every day.     • torsemide (DEMADEX) 10 MG tablet Take 1 Tab by mouth 1 time daily as needed. Prn swelling     • omeprazole (PRILOSEC) 20 MG delayed-release capsule Take 1 Cap by mouth every day. (Patient not taking: Reported on 1/24/2018) 30 Cap 1   • granisetron (KYTRIL) 1 MG Tab Take 1 Tab by mouth every 12 hours as needed for Nausea/Vomiting. 30 Tab 1   • diazepam (VALIUM) 5 MG Tab TAKE 1/2 TO 1 TABLET BY MOUTH EVERY 6 HOURS AS NEEDED FOR MUSCLE SPASM 120 Tab 0   • ondansetron (ZOFRAN ODT) 4 MG TABLET DISPERSIBLE Take 2 Tabs by mouth every 8 hours as needed. 30 Tab 3   • potassium chloride (MICRO-K) 10 MEQ capsule Take 2 Caps by mouth 1 time daily as needed. WITH LASIX ONLY 60 Cap 2   • ipratropium-albuterol (DUONEB) 0.5-2.5 (3) MG/3ML nebulizer solution 3 mL by Nebulization route 4 times a day. 75 mL 2   • albuterol 108 (90 BASE) MCG/ACT Aero Soln inhalation aerosol Inhale 2 Puffs by mouth every 6 hours as needed for Shortness of Breath. 1 Inhaler 1   • fluticasone (FLONASE) 50 MCG/ACT nasal spray Spray 1-2 Sprays in nose 1 time  "daily as needed. Indications: Hayfever 1 Bottle 3   • estradiol (ESTRACE) 0.1 MG/GM vaginal cream Insert 0.5g vaginally three times each week. 1 Tube 6   • BD INTEGRA SYRINGE 25G X 1\" 3 ML Misc USE 1 SYRINGE AS DIRECTED EVERY 30 DAYS 11 Each 0   • lidocaine (LIDODERM) 5 % PTCH   1   • cyanocobalamin (VITAMIN B-12) 1000 MCG/ML SOLN 1 mL by Intramuscular route every 30 days. 1 mL 0   • cetirizine (ZYRTEC) 10 MG TABS Take 10 mg by mouth 1 time daily as needed. For allergies     • oxycodone 20 MG/ML CONC Take 30 mg by mouth every four hours as needed. 20mg/ml solution       Current Facility-Administered Medications   Medication Dose Route Frequency Provider Last Rate Last Dose   • cyanocobalamin (VITAMIN B-12) injection 1,000 mcg  1,000 mcg Intramuscular Q30 DAYS Amanda Bazzi M.D.   1,000 mcg at 01/11/18 1552         Allergies   Allergen Reactions   • Morphine Swelling   • Ativan [Lorazepam]      States give me nightmares.    • Azathioprine Sodium Hives   • Demerol Vomiting   • Elavil [Amitriptyline]      Nightmares     • Fentanyl      Patches caused skin breakdown, no pain relief   • Kdc:Fentanyl    • Lyrica [Pregabalin]    • Methadone    • Methotrexate Hives and Rash   • Methotrexate Derivatives    • Pseudoephedrine Vomiting   • Remicade [Infliximab Injection] Hives   • Roxanol [Morphine Sulfate]      Throat swells   • Sulfa Drugs Hives   • Sulfasalazine    • Tape Rash     Skin peels off; paper tape   • Tizanidine Hydrochloride    • Celestone [Betamethasone Sodium Phosphate]          Family History   Problem Relation Age of Onset   • Lung Disease Mother      copd   • Diabetes Father    • Heart Disease Father    • Diabetes Sister    • Cancer Maternal Aunt      breast         Social History     Social History   • Marital status:      Spouse name: N/A   • Number of children: N/A   • Years of education: N/A     Occupational History   • Not on file.     Social History Main Topics   • Smoking status: Never Smoker "   • Smokeless tobacco: Never Used      Comment: second hand smoke   • Alcohol use Yes      Comment: rare   • Drug use: Yes      Comment: medical marijuana   • Sexual activity: Not on file      Comment:      Other Topics Concern   • Not on file     Social History Narrative   • No narrative on file         Physical Exam:  Ambulatory Vitals  Blood pressure 160/80, pulse (!) 108, height 1.829 m (6'), weight 70.3 kg (155 lb), SpO2 91 %.   Oxygen Therapy:  Pulse Oximetry: 91 %  BP Readings from Last 4 Encounters:   01/24/18 160/80   12/29/17 140/80   12/26/17 140/80   12/14/17 142/84       Weight/BMI: Body mass index is 21.02 kg/m².  Wt Readings from Last 4 Encounters:   01/24/18 70.3 kg (155 lb)   12/29/17 68.9 kg (152 lb)   12/26/17 69.9 kg (154 lb)   12/14/17 69.9 kg (154 lb)       General: NAD  Eyes: nl conjunctiva  ENT: OP clear  Neck: JVP <8 cm H2O, no carotid bruits  Lungs: normal respiratory effort, bibasilar crackles  Heart: RRR, 2/6 systolic murmur, no rubs or gallops, trace edema bilateral lower extremities. No LV/RV heave on cardiac palpatation. 2+ bilateral radial pulses.  2+ bilateral dp pulses.   Abdomen: soft, non-tender, non distended, no masses, normal bowel sounds.  No HSM.  Extremities/MSK: no clubbing, no cyanosis  Neurological: No focal sensory deficits  Psychiatric: Appropriate affect, A/O x 3  Skin: Warm extremities      Lab Data Review:  Lab Results   Component Value Date/Time    CHOLSTRLTOT 197 12/04/2015 09:02 AM     (H) 12/04/2015 09:02 AM    HDL 67 12/04/2015 09:02 AM    TRIGLYCERIDE 71 12/04/2015 09:02 AM       Lab Results   Component Value Date/Time    SODIUM 135 12/27/2017 08:40 AM    POTASSIUM 4.4 12/27/2017 08:40 AM    CHLORIDE 100 12/27/2017 08:40 AM    CO2 27 12/27/2017 08:40 AM    GLUCOSE 103 (H) 12/27/2017 08:40 AM    BUN 19 12/27/2017 08:40 AM    CREATININE 1.08 12/27/2017 08:40 AM    CREATININE 0.89 02/10/2010 04:04 PM    BUNCREATRAT 17 02/10/2010 04:04 PM    GLOMRATE  >59 02/10/2010 04:04 PM     Lab Results   Component Value Date/Time    ALKPHOSPHAT 119 (H) 12/27/2017 08:40 AM    ASTSGOT 34 12/27/2017 08:40 AM    ALTSGPT 43 12/27/2017 08:40 AM    TBILIRUBIN 0.7 12/27/2017 08:40 AM      Lab Results   Component Value Date/Time    WBC 8.2 12/27/2017 08:40 AM    WBC 7.9 02/10/2010 04:04 PM     Lab Results   Component Value Date/Time    HBA1C 5.3 08/27/2015 01:50 PM     No components found for: TROP          Cardiac Imaging and Procedures Review:    EKG dated 12/26/2017 : My personal interpretation is NSR, LAE, RsR' in V1/V2     Echo dated 2013:   FINDINGS  Left Ventricle  Normal left ventricular size, thickness, systolic function, and   diastolic function. Left ventricular ejection fraction is 55% to 60%.    Right Ventricle  The right ventricle was normal in size and function.      Right Atrium  The right atrium is normal in size.      Left Atrium  The left atrium is normal in size.      Mitral Valve  The mitral valve is structurally and functionally normal.    Aortic Valve  The aortic valve is structurally and functionally normal.    Tricuspid Valve  The tricuspid valve is structurally and functionally normal. Right   ventricular systolic pressure is estimated to be 14 mmHg.    Pulmonic Valve  The pulmonic valve is structurally and functionally normal.    Pericardium  Normal pericardium without effusion.      Aorta  The aortic root is normal.      CONCLUSIONS  Normal echocardiogram.         TTE (12/27/2017):  CONCLUSIONS  1. Normal right and left ventricular size and function. Normal regional wall motion  2. Abnormal septal motion consistent with RV volume overload, however estimated right atrial pressure by IVC assessment is normal  3. No significant valve disease or flow abnormalities.   4. Unable to estimate pulmonary artery pressure due to an inadequate tricuspid regurgitant jet.    Compared to the images of the study done 4/3/13 - there has been no significant change.       Nuclear Perfusion Imaging ():   RESULTS:  1.  There were no new ECG changes and no arrhythmia.    2.  Heart rate did increase to 92 and the blood pressure negrito somewhat to   162/77 after an initial drop to 117 systolic but then returned to baseline.  3.  The raw data demonstrated no unexpected extracardiac isotope uptake,   fairly dense collection of isotope in the right mid to lower abdomen.  4.  Rest-stress image comparison reveals no perfusion abnormalities in any of   the multiple SPECT image panels.  5.  The gated wall motion study demonstrated low cardiac volumes with very   small end systolic volume.  The global contractility was normal, EF 74%.    Regional contractility also normal.     SUMMARY:  Lexiscan stress myocardial perfusion stress imaging with technetium   99m tetrofosmin demonstratin.  No infarct or ischemia.  2.  Normal global and hyperdynamic regional function, EF 74%.  3.  No ECG changes or arrhythmia.  4.  No prior for comparison.              Radiology test Review:  CXR:   The mediastinal and cardiac silhouette is unremarkable.    The pulmonary vascularity is within normal limits.    The lung parenchyma demonstrates no airspace process. Calcified granuloma in the right lateral mid hemithorax is unchanged..    There is no significant pleural effusion.    There is no visible pneumothorax.    There are no acute bony abnormalities.   Impression        1.  No evidence of acute cardiopulmonary process.               Medical Decision Makin. Atrial fibrillation, unspecified type (CMS-HCC)  Paroxysmal, symptomatic.  Also has occasional short runs of symptomatic SVT.  given normotension at home, her [id number] score of 1-2.  We discussed at length risk/benefit of anticoagulation given her history of crohns, and she has opted for only anticoagulation with aspirin at this time.  -continue aspirin for AC for now  -increase metoprolol to 50mg PO bid for rate control     2. Other chest  pain  Concerning for angina, worsening.  -stress test given symptoms have not improved on anti-anginal agents.   -continue chewable aspirin as tablets go out through colostomy  -sucralfate for GI ppx while on aspirin.   -ED precautions discussed.   -cont metoprolol     3. Essential (primary) hypertension   Likely white coat hypertension given very low readings at home that she has confirmed with her PCP's blood pressure cuff.  -cont spironolactone     4. Leg swelling  -continue spironolactone daily and torsemide/potassium prn  -will use CPAP, I believe this will help most of her symptoms.     5. TRUDY  -continue CPAP, many of symptoms much improved.          Return in about 3 months (around 4/24/2018).      Harvey Monahan MD  [facility]  [place] [facility], [address]  [address] [address]  Phone: [phone]  Fax: [phone]

## 2018-01-24 NOTE — PATIENT INSTRUCTIONS
Please increase metoprolol to 50mg (two pills) twice a day.    Please start sucralfate 1gram twice a day for abdominal discomfort.

## 2018-01-25 ENCOUNTER — OFFICE VISIT (OUTPATIENT)
Dept: BEHAVIORAL HEALTH | Facility: PHYSICIAN GROUP | Age: 63
End: 2018-01-25
Payer: MEDICARE

## 2018-01-25 DIAGNOSIS — F06.8 ANXIETY DISORDER DUE TO MULTIPLE MEDICAL PROBLEMS: ICD-10-CM

## 2018-01-25 PROCEDURE — 90834 PSYTX W PT 45 MINUTES: CPT | Performed by: PSYCHOLOGIST

## 2018-01-29 NOTE — BH THERAPY
[facility]  Therapy Progress Note    Patient Name: Jana Overton  Patient MRN: [id number]  Today's Date: 1/25/2018     Type of session:Individual psychotherapy  Length of session: 55 minutes  Persons in attendance:Patient    Subjective/New Info: Client arrived on time for individual therapy appointment. She reports that she has been doing well since previous visit. Writer and client discussed how client's medical problems continue to have an impact on her psychological functioning. Client states that she began a new heart medication that has allowed her to start being more physically active. Client discussed taking a long walk the other morning and then feeling motivated to make dinner and do other things around the house. Writer and client discussed the concept of balance and how client might incorporate it into her life.     Objective/Observations:   Participation: Active verbal participation   Grooming: Casual   Cognition: Alert   Eye contact: Good   Mood: Euthymic   Affect: Flexible   Thought process: Logical and Goal-directed   Speech: Rate within normal limits and Volume within normal limits   Other:     Diagnoses:   1. 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 Not Indicated   If evidence of imminent risk is present, intervention/plan:     Therapeutic Intervention(s): Leisure and recreation skills and Supportive psychotherapy    Treatment Goal(s)/Objective(s) addressed: Reduce client's symptoms of anxiety     Progress toward Treatment Goals: Moderate improvement    Plan:  - Next appointment scheduled:  2/2/2018    Fozia Clarke, Ph.D.  1/2/2018

## 2018-01-31 ENCOUNTER — APPOINTMENT (OUTPATIENT)
Dept: RADIOLOGY | Facility: MEDICAL CENTER | Age: 63
End: 2018-01-31
Attending: INTERNAL MEDICINE
Payer: MEDICARE

## 2018-02-02 ENCOUNTER — APPOINTMENT (OUTPATIENT)
Dept: BEHAVIORAL HEALTH | Facility: PHYSICIAN GROUP | Age: 63
End: 2018-02-02
Payer: MEDICARE

## 2018-02-02 ENCOUNTER — TELEPHONE (OUTPATIENT)
Dept: CARDIOLOGY | Facility: MEDICAL CENTER | Age: 63
End: 2018-02-02

## 2018-02-02 NOTE — TELEPHONE ENCOUNTER
----- Message -----  From: Grace Mcallister  Sent: 2/1/2018   3:21 PM  To: Rhona Olivier R.N.  Subject: PET scan instructions & med reaction             FELICITA/rhona    Pt calling with to report losing her instruction sheets for 2/5 nuc med PT scan & asks to review prep with you. (She did not want me to have Imaging give her instructions)    Pt also report a reaction to sucralfate (CARAFATE) 1 GM, pharmacist told pt to call FELICITA about this. Please call Jana at .    =====================================================================    Called pt, pt reports she developed mouth sores days after starting the Carafate and the last two days she developed Rash, she took Benadryl and stopped taking the meds and now Rash is improving, denies any SOB or resp distress, pt is also requesting to send her instructions through [employer] for her upcoming NM PET scan. Informed pt we could copy something from [employer] but usually Imaging is the one that gives them instructions, pt appreciative and verbalizes understanding.    Carafate added on pt's list of allergies.    ZACHARY to Dr Monahan

## 2018-02-05 ENCOUNTER — HOSPITAL ENCOUNTER (OUTPATIENT)
Dept: RADIOLOGY | Facility: MEDICAL CENTER | Age: 63
End: 2018-02-05
Attending: INTERNAL MEDICINE
Payer: MEDICARE

## 2018-02-05 DIAGNOSIS — R07.89 OTHER CHEST PAIN: ICD-10-CM

## 2018-02-05 DIAGNOSIS — I20.89 ANGINA EFFORT: ICD-10-CM

## 2018-02-05 PROCEDURE — A9555 RB82 RUBIDIUM: HCPCS

## 2018-02-05 PROCEDURE — 700111 HCHG RX REV CODE 636 W/ 250 OVERRIDE (IP)

## 2018-02-06 ENCOUNTER — APPOINTMENT (RX ONLY)
Dept: URBAN - METROPOLITAN AREA CLINIC 36 | Facility: CLINIC | Age: 63
Setting detail: DERMATOLOGY
End: 2018-02-06

## 2018-02-06 DIAGNOSIS — Z41.9 ENCOUNTER FOR PROCEDURE FOR PURPOSES OTHER THAN REMEDYING HEALTH STATE, UNSPECIFIED: ICD-10-CM

## 2018-02-06 PROCEDURE — ? SCITON BBL

## 2018-02-06 ASSESSMENT — LOCATION ZONE DERM: LOCATION ZONE: FACE

## 2018-02-06 ASSESSMENT — LOCATION SIMPLE DESCRIPTION DERM: LOCATION SIMPLE: LEFT FOREHEAD

## 2018-02-06 ASSESSMENT — LOCATION DETAILED DESCRIPTION DERM: LOCATION DETAILED: LEFT MEDIAL FOREHEAD

## 2018-02-06 NOTE — PROCEDURES
REFERRING PHYSICIAN:  Harvey Monahan MD    HEIGHT:  72 inches.  WEIGHT:  155 pounds.  AGE:  62.  SEX:  Female.    INDICATIONS:  Chest pain.    PROCEDURE:  The patient reviewed and signed the acknowledgement for testing   form.  The patient was in a fasting state and was properly prepared for   testing.  An intravenous line was inserted and a flush of normal saline   followed to insure line patency.    A transmission scan was acquired for attenuation correction using the internal   Germanium sources.  The patient was then administered 25.0 mCi of   Rubidium-82.  Approximately 90 seconds after the infusion, resting imagine   were obtained with ECG-gating.  Following the resting series, the patient   administered 40 mg of dipyridamole over four minutes.  The blood pressure,   heart rate and ECG were monitored and recorded.  After the dipyridamole   infusion was completed, another transmission scan for attenuation correction   was obtained.  The patient was then administered 25.2 mCi of Rubidium-82.    Approximately 90 seconds after the infusion, Peak stress images were obtained   with ECG-gating.    CLINICAL RESPONSE:  Resting blood pressure was 94/52 with a heart rate of 67.    Immediately post-dipyridamole infusion the blood pressure was 126/56 with a   heart rate of 77.  After a recovery period the blood pressure was 114/64 with   a heart rate of 71.    The patient experienced chest and jaw pain with a headache during testing.    Aminophylline 100 mg was administered following the scan.    ELECTROCARDIOGRAPHIC FINDINGS:  EKG review shows a normal sinus rhythm with no   ischemic ST segment changes at rest or stress.    SCINTOGRAPHIC FINDINGS:  There is normal homogenous tracer uptake at rest and   stress.    GATED WALL MOTION FINDINGS:  Show an ejection fraction of 72% at rest, which   increases to 78% at peak stress.    CONCLUSIONS AND IMPRESSION:  Normal cardiac stress test.        ____________________________________     MD OPAL JACKSON / TUCKER    DD:  02/06/2018 07:16:01  DT:  02/06/2018 07:51:33    D#:  [id number]  Job#:  [id number]

## 2018-02-06 NOTE — PROCEDURE: SCITON BBL
Passes: 1
Consent: Written consent obtained, risks reviewed including but not limited to crusting, scabbing, blistering, scarring, darker or lighter pigmentary change, bruising, and/or incomplete response.
Spot Size: Finesse Adapter Size: 15 x 45 mm (No Finesse Adapter)
Anesthesia Volume In Cc: 0
Detail Level: Zone
Fluence (J/Cm2): 14
Cooling (In C): 25
Fluence (J/Cm2): 20
Post Procedure Text: The patient tolerated the procedure well. Ice-chilled washclothes were applied to the treatment area for comfort. Post care was reviewed with the patient.
Spot Size: Finesse Adapter Size: 7 mm round
Pulse Duration Units: milliseconds
Cooling ?: Yes
Pulse Duration: 30
Post-Care Instructions: I reviewed with the patient in detail post-care instructions. Patient should stay away from the sun and wear sun protection until treated areas are fully healed.
Price (Use Numbers Only, No Special Characters Or $): 350.00
Cooling (In C): 15
Spot Size: Finesse Adapter Size: 15 x 15 mm square
Fluence (J/Cm2): 12
Preprocedure Text: The treatment areas were thoroughly cleaned. Any exposed at risk hair that was not to be treated was covered in white paper tape. Clear ultrasound gel was applied to the treatment area. The area was treated with no immediate stacking of pulses.
Pulse Duration: 10

## 2018-02-08 ENCOUNTER — APPOINTMENT (RX ONLY)
Dept: URBAN - METROPOLITAN AREA CLINIC 20 | Facility: CLINIC | Age: 63
Setting detail: DERMATOLOGY
End: 2018-02-08

## 2018-02-08 DIAGNOSIS — L20.89 OTHER ATOPIC DERMATITIS: ICD-10-CM

## 2018-02-08 PROBLEM — L20.84 INTRINSIC (ALLERGIC) ECZEMA: Status: ACTIVE | Noted: 2018-02-08

## 2018-02-08 PROBLEM — D48.5 NEOPLASM OF UNCERTAIN BEHAVIOR OF SKIN: Status: ACTIVE | Noted: 2018-02-08

## 2018-02-08 PROCEDURE — 99212 OFFICE O/P EST SF 10 MIN: CPT | Mod: 25

## 2018-02-08 PROCEDURE — ? BIOPSY BY SHAVE METHOD

## 2018-02-08 PROCEDURE — 67810 INCAL BX EYELID SKN LID MRGN: CPT | Mod: RT

## 2018-02-08 PROCEDURE — ? PRESCRIPTION

## 2018-02-08 RX ORDER — EMOLLIENT COMBINATION NO.104
CREAM (GRAM) TOPICAL
Qty: 1 | Refills: 9 | Status: ERX | COMMUNITY
Start: 2018-02-08

## 2018-02-08 RX ADMIN — Medication 1: at 00:00

## 2018-02-08 NOTE — PROCEDURE: BIOPSY BY SHAVE METHOD
Lab: [id number]
Anesthesia Volume In Cc: 0.5
Biopsy Type: H and E
Electrodesiccation Text: The wound bed was treated with electrodesiccation after the biopsy was performed.
Silver Nitrate Text: The wound bed was treated with silver nitrate after the biopsy was performed.
Detail Level: Detailed
Billing Type: Third-Party Bill
Additional Anesthesia Volume In Cc (Will Not Render If 0): 0
Cryotherapy Text: The wound bed was treated with cryotherapy after the biopsy was performed.
Bill 68305 For Specimen Handling/Conveyance To Laboratory?: no
Consent: Written consent was obtained and risks were reviewed including but not limited to scarring, infection, bleeding, scabbing, incomplete removal, nerve damage and allergy to anesthesia.
Hemostasis: Drysol
Anesthesia Type: 1% lidocaine with epinephrine
Wound Care: Vaseline
Electrodesiccation And Curettage Text: The wound bed was treated with electrodesiccation and curettage after the biopsy was performed.
Curettage Text: The wound bed was treated with curettage after the biopsy was performed.
Biopsy Method: Dermablade
Notification Instructions: Patient will be notified of biopsy results. However, patient instructed to call the office if not contacted within 2 weeks.
Render Post-Care Instructions In Note?: yes
Dressing: bandage
Lab Facility: 
Type Of Destruction Used: Curettage
Post-Care Instructions: I reviewed with the patient in detail post-care instructions. Patient is to keep the biopsy site dry overnight, and then apply bacitracin twice daily until healed. Patient may apply hydrogen peroxide soaks to remove any crusting.

## 2018-02-08 NOTE — HPI: SKIN LESION
Is This A New Presentation, Or A Follow-Up?: Skin Lesion
How Severe Is Your Skin Lesion?: moderate
Has Your Skin Lesion Been Treated?: been treated
When Was It Treated?: 01/10/2010

## 2018-02-09 ENCOUNTER — APPOINTMENT (OUTPATIENT)
Dept: BEHAVIORAL HEALTH | Facility: PHYSICIAN GROUP | Age: 63
End: 2018-02-09
Payer: MEDICARE

## 2018-02-12 ENCOUNTER — TELEPHONE (OUTPATIENT)
Dept: CARDIOLOGY | Facility: MEDICAL CENTER | Age: 63
End: 2018-02-12

## 2018-02-12 NOTE — TELEPHONE ENCOUNTER
Pt called she is wondering about her NM-PET scan result, informed her that Dr Monahan has not reviewed it yet, will give her a call back once Dr Monahan reviewed it, pt appreciative and verbalizes understanding    Pt also reports that since Dr Monahan increased the Metoprolol dose, the pain on her R side of the breast improved for first few days and now its back and it is worse w/ exertion like walking.     To Dr Monahan, please review pt's NM-PET and advice, Thank You!

## 2018-02-14 ENCOUNTER — TELEPHONE (OUTPATIENT)
Dept: CARDIOLOGY | Facility: MEDICAL CENTER | Age: 63
End: 2018-02-14

## 2018-02-14 NOTE — TELEPHONE ENCOUNTER
PET DICTATED RESULTS   Order: [id number]   Status:  Final result   Visible to patient:  Yes (MyChart)   Notes Recorded by Harvey Monahan M.D. on 2/13/2018 at 6:25 PM PST  The patient's stress test was completely normal, please call her with the results and confirm they have no further questions.  If they have other results pending, let them know we will contact them when they are available. Thank you!  -Dr. Monahan     =======================================================================    Contacted patient, discussed Dr. Monahan's review of stress test.  Patient acknowledges understanding.     Patient reports relief of normal results but still is having intermittent chest discomfort, decreased stamina.  Denies chest pain, edema, weight gain.    Patient is going on vacation for 5 weeks, and will report back at time of return with log of symptoms if any.

## 2018-02-19 ENCOUNTER — PATIENT MESSAGE (OUTPATIENT)
Dept: INTERNAL MEDICINE | Facility: IMAGING CENTER | Age: 63
End: 2018-02-19

## 2018-02-19 RX ORDER — ACYCLOVIR 400 MG/1
400 TABLET ORAL 3 TIMES DAILY
Qty: 30 TAB | Refills: 1 | Status: SHIPPED | OUTPATIENT
Start: 2018-02-19 | End: 2018-11-29

## 2018-02-20 ENCOUNTER — NON-PROVIDER VISIT (OUTPATIENT)
Dept: INTERNAL MEDICINE | Facility: IMAGING CENTER | Age: 63
End: 2018-02-20
Payer: MEDICARE

## 2018-02-20 DIAGNOSIS — E53.8 B12 DEFICIENCY: ICD-10-CM

## 2018-02-20 PROCEDURE — 96372 THER/PROPH/DIAG INJ SC/IM: CPT | Performed by: FAMILY MEDICINE

## 2018-02-20 RX ADMIN — CYANOCOBALAMIN 1000 MCG: 1000 INJECTION, SOLUTION INTRAMUSCULAR; SUBCUTANEOUS at 09:03

## 2018-02-20 NOTE — TELEPHONE ENCOUNTER
From: Jana Overton  To: Amanda Bazzi M.D.  Sent: 2/19/2018 4:09 PM PST  Subject: Prescription Question    I think I am getting a cold sore on the side of my mouth. I have had only one (maybe) in the past but you put me on a pill that stopped the sore from getting worse. Can you send in a prescription for it. We are trying to leave on Wed. Feb. 21 for a month vacation. Please call me or leave a voice mail, text if you have any questions. Thank You, Jana Overton [phone]

## 2018-02-21 DIAGNOSIS — R60.9 EDEMA, UNSPECIFIED TYPE: ICD-10-CM

## 2018-02-21 RX ORDER — SPIRONOLACTONE 25 MG/1
TABLET ORAL
Qty: 60 TAB | Refills: 5 | Status: SHIPPED | OUTPATIENT
Start: 2018-02-21 | End: 2018-02-23 | Stop reason: SDUPTHER

## 2018-02-23 DIAGNOSIS — R60.9 EDEMA, UNSPECIFIED TYPE: ICD-10-CM

## 2018-02-23 RX ORDER — SPIRONOLACTONE 25 MG/1
50 TABLET ORAL 2 TIMES DAILY
Qty: 120 TAB | Refills: 3
Start: 2018-02-23 | End: 2018-11-29

## 2018-03-06 DIAGNOSIS — R11.0 CHRONIC NAUSEA: ICD-10-CM

## 2018-03-06 DIAGNOSIS — F51.01 PRIMARY INSOMNIA: ICD-10-CM

## 2018-03-06 RX ORDER — ZOLPIDEM TARTRATE 10 MG/1
TABLET ORAL
Qty: 30 TAB | Refills: 0 | Status: SHIPPED
Start: 2018-03-06 | End: 2018-04-05

## 2018-03-06 RX ORDER — ONDANSETRON 4 MG/1
TABLET, ORALLY DISINTEGRATING ORAL
Qty: 30 TAB | Refills: 1 | Status: SHIPPED | OUTPATIENT
Start: 2018-03-06 | End: 2018-05-24 | Stop reason: SDUPTHER

## 2018-03-16 ENCOUNTER — APPOINTMENT (OUTPATIENT)
Dept: SLEEP MEDICINE | Facility: MEDICAL CENTER | Age: 63
End: 2018-03-16
Payer: MEDICARE

## 2018-03-20 PROBLEM — J96.11 CHRONIC RESPIRATORY FAILURE WITH HYPOXIA (HCC): Status: ACTIVE | Noted: 2018-03-20

## 2018-04-19 ENCOUNTER — OFFICE VISIT (OUTPATIENT)
Dept: INTERNAL MEDICINE | Facility: IMAGING CENTER | Age: 63
End: 2018-04-19
Payer: MEDICARE

## 2018-04-19 VITALS
HEART RATE: 78 BPM | WEIGHT: 169 LBS | BODY MASS INDEX: 22.89 KG/M2 | SYSTOLIC BLOOD PRESSURE: 122 MMHG | OXYGEN SATURATION: 94 % | RESPIRATION RATE: 14 BRPM | HEIGHT: 72 IN | DIASTOLIC BLOOD PRESSURE: 60 MMHG | TEMPERATURE: 97.8 F

## 2018-04-19 DIAGNOSIS — I70.213 ATHEROSCLEROSIS OF NATIVE ARTERY OF BOTH LOWER EXTREMITIES WITH INTERMITTENT CLAUDICATION (HCC): ICD-10-CM

## 2018-04-19 DIAGNOSIS — J44.9 CHRONIC OBSTRUCTIVE PULMONARY DISEASE, UNSPECIFIED COPD TYPE (HCC): ICD-10-CM

## 2018-04-19 DIAGNOSIS — K50.819 CROHN'S DISEASE OF SMALL AND LARGE INTESTINES WITH COMPLICATION (HCC): ICD-10-CM

## 2018-04-19 DIAGNOSIS — E53.8 B12 DEFICIENCY: ICD-10-CM

## 2018-04-19 DIAGNOSIS — Z93.2 ILEOSTOMY IN PLACE (HCC): ICD-10-CM

## 2018-04-19 DIAGNOSIS — J30.1 CHRONIC SEASONAL ALLERGIC RHINITIS DUE TO POLLEN: ICD-10-CM

## 2018-04-19 DIAGNOSIS — I48.0 PAROXYSMAL ATRIAL FIBRILLATION (HCC): ICD-10-CM

## 2018-04-19 DIAGNOSIS — I47.10 SUPRAVENTRICULAR TACHYCARDIA: ICD-10-CM

## 2018-04-19 DIAGNOSIS — J96.11 CHRONIC RESPIRATORY FAILURE WITH HYPOXIA (HCC): ICD-10-CM

## 2018-04-19 DIAGNOSIS — F11.20 OPIOID TYPE DEPENDENCE, CONTINUOUS (HCC): ICD-10-CM

## 2018-04-19 PROCEDURE — 99214 OFFICE O/P EST MOD 30 MIN: CPT | Mod: 25 | Performed by: FAMILY MEDICINE

## 2018-04-19 RX ORDER — TRIAMCINOLONE ACETONIDE 40 MG/ML
40 INJECTION, SUSPENSION INTRA-ARTICULAR; INTRAMUSCULAR ONCE
Status: COMPLETED | OUTPATIENT
Start: 2018-04-19 | End: 2018-04-19

## 2018-04-19 RX ORDER — CYANOCOBALAMIN 1000 UG/ML
1000 INJECTION, SOLUTION INTRAMUSCULAR; SUBCUTANEOUS ONCE
Status: COMPLETED | OUTPATIENT
Start: 2018-04-19 | End: 2018-04-19

## 2018-04-19 RX ADMIN — CYANOCOBALAMIN 1000 MCG: 1000 INJECTION, SOLUTION INTRAMUSCULAR; SUBCUTANEOUS at 16:01

## 2018-04-19 RX ADMIN — TRIAMCINOLONE ACETONIDE 40 MG: 40 INJECTION, SUSPENSION INTRA-ARTICULAR; INTRAMUSCULAR at 16:26

## 2018-04-20 NOTE — PROGRESS NOTES
Chief Complaint   Patient presents with   • Fatigue   • Allergic Rhinitis       HISTORY OF PRESENT ILLNESS: Patient is a 62 y.o. female established patient who presents today complaining of sinus congestion, itchy eyes, ears feel plugged. She does have bilateral tubes in place. She does get allergy symptoms this time of year. She has tried taking Claritin is not using a nasal spray right now.    Also complaining of significantly increased fatigue over the past month. The only change is that she did establish with Dr. Monahan, cardiology she has paroxysmal atrial fibrillation and supraventricular tachycardia. He did a thorough evaluation and has placed her on metoprolol 50 mg twice daily. Previously she took 12.5 mg as needed for episodes of tachycardia.  She denies any chest pain. She has not had any tachycardia since the addition of daily metoprolol. She is also on an aspirin.    She does have Crohn's disease has had an ileostomy. She is working with physical therapy on abdominal symptoms.    She suffers from COPD which is stable. She continues to use oxygen 2 L at bedtime.    And she continues to complain about intermittent swelling in her legs. Sometimes she does get pain with walking. There has been no significant change. She is trying to wear her compression stockings more frequently.    She continues on chronic opioid therapy prescribed by Dr. Telles. She states she is trying to slowly taper this.      Patient Active Problem List    Diagnosis Date Noted   • Chronic respiratory failure with hypoxia (CMS-HCC) 03/20/2018   • Anxiety disorder due to multiple medical problems 12/01/2017   • DDD (degenerative disc disease), lumbar 04/12/2017   • History of DVT (deep vein thrombosis) 11/23/2016   • Hypercalcemia 03/26/2015   • Paroxysmal atrial fibrillation (CMS-HCC) 03/26/2015   • Sleep apnea 10/26/2014   • Postherpetic neuralgia 06/24/2014   • Multiple falls 06/24/2014   • COPD (chronic obstructive pulmonary  disease) (CMS-HCC) 06/24/2014   • Rosacea 06/24/2014   • Hypokalemia 06/24/2014   • Ileostomy in place (CMS-HCC) 06/26/2013   • GERD (gastroesophageal reflux disease) 12/05/2012   • Status post lumbar surgery 07/16/2012   • Crohn's disease (CMS-HCC) 09/23/2009   • Chronic pain 09/23/2009   • Opioid type dependence, continuous (CMS-HCC) 09/23/2009     Current Outpatient Prescriptions on File Prior to Visit   Medication Sig Dispense Refill   • spironolactone (ALDACTONE) 25 MG Tab Take 2 Tabs by mouth 2 times a day. 120 Tab 3   • metoprolol (LOPRESSOR) 25 MG Tab Take 2 Tabs by mouth 2 times a day. Hold if your blood pressure is less than 95/50. 180 Tab 3   • torsemide (DEMADEX) 10 MG tablet Take 1 Tab by mouth 1 time daily as needed. Prn swelling     • potassium chloride (MICRO-K) 10 MEQ capsule Take 2 Caps by mouth 1 time daily as needed. WITH LASIX ONLY 60 Cap 2   • estradiol (ESTRACE) 0.1 MG/GM vaginal cream Insert 0.5g vaginally three times each week. 1 Tube 6   • Probiotic Product (PROBIOTIC DAILY) CAPS Take 1 Cap by mouth every day.     • cetirizine (ZYRTEC) 10 MG TABS Take 10 mg by mouth 1 time daily as needed. For allergies     • oxycodone 20 MG/ML CONC Take 30 mg by mouth every four hours as needed. 20mg/ml solution     • ondansetron (ZOFRAN ODT) 4 MG TABLET DISPERSIBLE DISSOLVE 2 TABLETS ON THE TONGUE EVERY 8 HOURS AS NEEDED 30 Tab 1   • acyclovir (ZOVIRAX) 400 MG tablet Take 1 Tab by mouth 3 times a day. 30 Tab 1   • Magnesium 100 MG Tab Take  by mouth every 48 hours.     • MAGNESIUM CITRATE PO Take  by mouth every 48 hours.     • aspirin (ASA) 81 MG Chew Tab chewable tablet Take 1 Tab by mouth every day. 100 Tab 1   • granisetron (KYTRIL) 1 MG Tab Take 1 Tab by mouth every 12 hours as needed for Nausea/Vomiting. 30 Tab 1   • diazepam (VALIUM) 5 MG Tab TAKE 1/2 TO 1 TABLET BY MOUTH EVERY 6 HOURS AS NEEDED FOR MUSCLE SPASM 120 Tab 0   • ipratropium-albuterol (DUONEB) 0.5-2.5 (3) MG/3ML nebulizer solution 3 mL  "by Nebulization route 4 times a day. 75 mL 2   • albuterol 108 (90 BASE) MCG/ACT Aero Soln inhalation aerosol Inhale 2 Puffs by mouth every 6 hours as needed for Shortness of Breath. 1 Inhaler 1   • BD INTEGRA SYRINGE 25G X 1\" 3 ML Misc USE 1 SYRINGE AS DIRECTED EVERY 30 DAYS 11 Each 0   • lidocaine (LIDODERM) 5 % PTCH   1     No current facility-administered medications on file prior to visit.        Past medical, surgical, family, and social history is reviewed and updated in Epic chart by me today.   Medications and allergies reviewed and updated in Epic chart by me today.     REVIEW OF SYSTEMS:  GENERAL: Increased fatigue, no weight loss.  HEENT:  Symptoms as above.  CV:  No chest pain,dyspnea,palpitations or edema.  RESP:  No sob,cough,wheezing or hemoptysis.  GI: Normal stools for her  :  No dysuria, polyuria, hematuria, incontinence, or nocturia.  MS: Generalized achiness.  NEURO:  No seizures, syncope, paralysis, tremor, or weakness.  SKIN: No new or concerning skin lesions or changes.   PSYCH: Mood fine. Denies depression, no feelings of hopelessness or suicide.    Vitals:    04/19/18 1500   BP: 122/60   Pulse: 78   Resp: 14   Temp: 36.6 °C (97.8 °F)   SpO2: 94%   Weight: 76.7 kg (169 lb)   Height: 1.84 m (6' 0.44\")     Physical Exam:  GENERAL;  WD/WN, No acute distress.  HEENT:  PERRLA, EOMI, conjunctiva and sclera are both mildly injected. She has some increased tearing. Clear drainage.                 TM's with PE tubes bilaterally. No erythema.                 Nasal mucosa erythematous and edematous.                 Pharynx injected. No exudate.  NECK: Supple with no adenopathy or thyromegaly.  LUNGS: Clear with no rales, rhonchi, or wheezes.  COR: RR with no murmur, gallop or rub.  EXT: No edema.      Assessment/Plan:  1. Chronic seasonal allergic rhinitis due to pollen . Encouraged her to use Flonase daily and Kenalog injection today.     2. B12 deficiency  cyanocobalamin (VITAMIN B-12) injection " 1,000 mcg   3. Atherosclerosis of native artery of both lower extremities with intermittent claudication (CMS-[facility]) . Stable.     4. Ileostomy in place (CMS-HCC). Stable. She'll continue working with PT on her abdominal symptoms.     5. Crohn's disease of small and large intestines with complication (CMS-HCC) . Stable     6. Chronic obstructive pulmonary disease, unspecified COPD type (CMS-HCC). Stable.     7. Opioid type dependence, continuous (CMS-HCC) . Stable. Continue follow-up with Dr. Telles.     8. Paroxysmal atrial fibrillation (CMS-[facility]). Continue follow-up with cardiology. I do recommend decreasing her metoprolol to 25 mg in the morning, 50 mg at night. She may need to even decrease it a little more--- I suspect this is contributing to her fatigue. She'll let me know over the next 2 weeks if she feels better on a slightly decreased dose.     9. Supraventricular tachycardia (CMS-[facility]) . Stable. Continue cardiology follow-up.     10. Chronic respiratory failure with hypoxia (CMS-HCC). She continues on oxygen at night.        Follow-up here in 3 months and as needed

## 2018-05-02 ENCOUNTER — TELEPHONE (OUTPATIENT)
Dept: INTERNAL MEDICINE | Facility: IMAGING CENTER | Age: 63
End: 2018-05-02

## 2018-05-03 RX ORDER — AMOXICILLIN AND CLAVULANATE POTASSIUM 875; 125 MG/1; MG/1
1 TABLET, FILM COATED ORAL 2 TIMES DAILY
Qty: 20 TAB | Refills: 0 | Status: SHIPPED | OUTPATIENT
Start: 2018-05-03 | End: 2018-07-10

## 2018-05-03 NOTE — TELEPHONE ENCOUNTER
Still with lots of sinus pressure and congestion. Ears hurt. Has ENT appointment next week. Will start Augmentin, continue Mucinex, Flonase, irrigation.

## 2018-05-24 DIAGNOSIS — R11.0 CHRONIC NAUSEA: ICD-10-CM

## 2018-05-24 RX ORDER — ONDANSETRON 4 MG/1
TABLET, ORALLY DISINTEGRATING ORAL
Qty: 30 TAB | Refills: 2 | Status: SHIPPED | OUTPATIENT
Start: 2018-05-24 | End: 2018-07-10 | Stop reason: SDUPTHER

## 2018-07-10 ENCOUNTER — HOSPITAL ENCOUNTER (OUTPATIENT)
Facility: MEDICAL CENTER | Age: 63
End: 2018-07-10
Attending: FAMILY MEDICINE
Payer: MEDICARE

## 2018-07-10 ENCOUNTER — OFFICE VISIT (OUTPATIENT)
Dept: INTERNAL MEDICINE | Facility: IMAGING CENTER | Age: 63
End: 2018-07-10
Payer: MEDICARE

## 2018-07-10 VITALS
SYSTOLIC BLOOD PRESSURE: 118 MMHG | DIASTOLIC BLOOD PRESSURE: 64 MMHG | WEIGHT: 174 LBS | HEART RATE: 76 BPM | HEIGHT: 72 IN | OXYGEN SATURATION: 92 % | TEMPERATURE: 96.7 F | BODY MASS INDEX: 23.57 KG/M2 | RESPIRATION RATE: 14 BRPM

## 2018-07-10 DIAGNOSIS — R11.0 CHRONIC NAUSEA: ICD-10-CM

## 2018-07-10 DIAGNOSIS — K50.819 CROHN'S DISEASE OF SMALL AND LARGE INTESTINES WITH COMPLICATION (HCC): ICD-10-CM

## 2018-07-10 DIAGNOSIS — I48.0 PAROXYSMAL ATRIAL FIBRILLATION (HCC): ICD-10-CM

## 2018-07-10 DIAGNOSIS — H66.93 CHRONIC OTITIS MEDIA OF BOTH EARS: ICD-10-CM

## 2018-07-10 DIAGNOSIS — F11.20 OPIOID TYPE DEPENDENCE, CONTINUOUS (HCC): ICD-10-CM

## 2018-07-10 DIAGNOSIS — Z00.00 MEDICARE ANNUAL WELLNESS VISIT, SUBSEQUENT: ICD-10-CM

## 2018-07-10 DIAGNOSIS — J96.11 CHRONIC RESPIRATORY FAILURE WITH HYPOXIA (HCC): ICD-10-CM

## 2018-07-10 DIAGNOSIS — H69.93 DYSFUNCTION OF BOTH EUSTACHIAN TUBES: ICD-10-CM

## 2018-07-10 DIAGNOSIS — Z12.4 SCREENING FOR MALIGNANT NEOPLASM OF CERVIX: ICD-10-CM

## 2018-07-10 DIAGNOSIS — G89.4 CHRONIC PAIN SYNDROME: ICD-10-CM

## 2018-07-10 DIAGNOSIS — Z93.2 ILEOSTOMY IN PLACE (HCC): ICD-10-CM

## 2018-07-10 DIAGNOSIS — J44.9 CHRONIC OBSTRUCTIVE PULMONARY DISEASE, UNSPECIFIED COPD TYPE (HCC): ICD-10-CM

## 2018-07-10 DIAGNOSIS — E53.8 B12 DEFICIENCY: ICD-10-CM

## 2018-07-10 DIAGNOSIS — M62.838 MUSCLE SPASM: ICD-10-CM

## 2018-07-10 DIAGNOSIS — G47.30 SLEEP APNEA, UNSPECIFIED TYPE: ICD-10-CM

## 2018-07-10 PROCEDURE — G0439 PPPS, SUBSEQ VISIT: HCPCS | Mod: 25 | Performed by: FAMILY MEDICINE

## 2018-07-10 PROCEDURE — 88175 CYTOPATH C/V AUTO FLUID REDO: CPT

## 2018-07-10 PROCEDURE — 96372 THER/PROPH/DIAG INJ SC/IM: CPT | Performed by: FAMILY MEDICINE

## 2018-07-10 RX ORDER — ACYCLOVIR 200 MG/5ML
400 SUSPENSION ORAL EVERY 8 HOURS
Qty: 120 ML | Refills: 0 | Status: SHIPPED | OUTPATIENT
Start: 2018-07-10 | End: 2018-11-29

## 2018-07-10 RX ORDER — TORSEMIDE 10 MG/1
10 TABLET ORAL
Qty: 30 TAB | Refills: 1 | Status: SHIPPED | OUTPATIENT
Start: 2018-07-10 | End: 2018-12-04

## 2018-07-10 RX ORDER — FLUTICASONE PROPIONATE 50 MCG
2 SPRAY, SUSPENSION (ML) NASAL DAILY
Qty: 1 BOTTLE | Refills: 2 | Status: SHIPPED | OUTPATIENT
Start: 2018-07-10 | End: 2018-11-29

## 2018-07-10 RX ORDER — ONDANSETRON 4 MG/1
4 TABLET, ORALLY DISINTEGRATING ORAL EVERY 6 HOURS PRN
Qty: 60 TAB | Refills: 2 | Status: ON HOLD | OUTPATIENT
Start: 2018-07-10 | End: 2019-01-08

## 2018-07-10 RX ORDER — AZELASTINE 1 MG/ML
1 SPRAY, METERED NASAL 2 TIMES DAILY
Qty: 30 ML | Refills: 1 | Status: SHIPPED | OUTPATIENT
Start: 2018-07-10 | End: 2018-12-04

## 2018-07-10 RX ORDER — CYANOCOBALAMIN 1000 UG/ML
1000 INJECTION, SOLUTION INTRAMUSCULAR; SUBCUTANEOUS ONCE
Status: COMPLETED | OUTPATIENT
Start: 2018-07-10 | End: 2018-07-10

## 2018-07-10 RX ORDER — DIAZEPAM 5 MG/1
2.5-5 TABLET ORAL EVERY 6 HOURS PRN
Qty: 120 TAB | Refills: 0 | Status: SHIPPED
Start: 2018-07-10 | End: 2018-08-09

## 2018-07-10 RX ADMIN — CYANOCOBALAMIN 1000 MCG: 1000 INJECTION, SOLUTION INTRAMUSCULAR; SUBCUTANEOUS at 15:20

## 2018-07-10 ASSESSMENT — PATIENT HEALTH QUESTIONNAIRE - PHQ9: CLINICAL INTERPRETATION OF PHQ2 SCORE: 0

## 2018-07-10 ASSESSMENT — ACTIVITIES OF DAILY LIVING (ADL): BATHING_REQUIRES_ASSISTANCE: 0

## 2018-07-10 ASSESSMENT — ENCOUNTER SYMPTOMS: GENERAL WELL-BEING: FAIR

## 2018-07-10 NOTE — PROGRESS NOTES
Chief Complaint   Patient presents with   • Annual Wellness Visit         HPI:  Jana is a 62 y.o. established female patient here for Medicare Annual Wellness Visit .  She is feeling well. She does have multiple chronic medical conditions, but states she is having a very good summer.          Patient Active Problem List    Diagnosis Date Noted   • Chronic respiratory failure with hypoxia ([facility]) 03/20/2018   • Anxiety disorder due to multiple medical problems 12/01/2017   • DDD (degenerative disc disease), lumbar 04/12/2017   • History of DVT (deep vein thrombosis) 11/23/2016   • Paroxysmal atrial fibrillation ([facility]) 03/26/2015   • Sleep apnea 10/26/2014   • Postherpetic neuralgia 06/24/2014   • Multiple falls 06/24/2014   • COPD (chronic obstructive pulmonary disease) ([facility]) 06/24/2014   • Rosacea 06/24/2014   • Ileostomy in place ([facility]) 06/26/2013   • GERD (gastroesophageal reflux disease) 12/05/2012   • Status post lumbar surgery 07/16/2012   • Crohn's disease ([facility]) 09/23/2009   • Chronic pain 09/23/2009   • Opioid type dependence, continuous (CMS-HCC) 09/23/2009       Current Outpatient Prescriptions   Medication Sig Dispense Refill   • diazePAM (VALIUM) 5 MG Tab Take 0.5-1 Tabs by mouth every 6 hours as needed (muscle spasm) for up to 30 days. 120 Tab 0   • torsemide (DEMADEX) 10 MG tablet Take 1 Tab by mouth 1 time daily as needed. Prn swelling 30 Tab 1   • fluticasone (FLONASE) 50 MCG/ACT nasal spray Spray 2 Sprays in nose every day. 1 Bottle 2   • azelastine (ASTELIN) 137 MCG/SPRAY nasal spray [place] 1 Spray in nose 2 times a day. 30 mL 1   • acyclovir (ZOVIRAX) 200 MG/5ML Suspension Take 10 mL by mouth every 8 hours. 120 mL 0   • ondansetron (ZOFRAN ODT) 4 MG TABLET DISPERSIBLE Take 1 Tab by mouth every 6 hours as needed for Nausea. 60 Tab 2   • spironolactone (ALDACTONE) 25 MG Tab Take 2 Tabs by mouth 2 times a day. (Patient taking differently: Take 50 mg by mouth every day.) 120 Tab 3   • metoprolol (LOPRESSOR)  "25 MG Tab Take 2 Tabs by mouth 2 times a day. Hold if your blood pressure is less than 95/50. 180 Tab 3   • aspirin (ASA) 81 MG Chew Tab chewable tablet Take 1 Tab by mouth every day. 100 Tab 1   • Probiotic Product (PROBIOTIC DAILY) CAPS Take 1 Cap by mouth every day.     • cetirizine (ZYRTEC) 10 MG TABS Take 10 mg by mouth 1 time daily as needed. For allergies     • acyclovir (ZOVIRAX) 400 MG tablet Take 1 Tab by mouth 3 times a day. 30 Tab 1   • Magnesium 100 MG Tab Take  by mouth every 48 hours.     • MAGNESIUM CITRATE PO Take  by mouth every 48 hours.     • granisetron (KYTRIL) 1 MG Tab Take 1 Tab by mouth every 12 hours as needed for Nausea/Vomiting. 30 Tab 1   • potassium chloride (MICRO-K) 10 MEQ capsule Take 2 Caps by mouth 1 time daily as needed. WITH LASIX ONLY 60 Cap 2   • ipratropium-albuterol (DUONEB) 0.5-2.5 (3) MG/3ML nebulizer solution 3 mL by Nebulization route 4 times a day. 75 mL 2   • albuterol 108 (90 BASE) MCG/ACT Aero Soln inhalation aerosol Inhale 2 Puffs by mouth every 6 hours as needed for Shortness of Breath. 1 Inhaler 1   • estradiol (ESTRACE) 0.1 MG/GM vaginal cream Insert 0.5g vaginally three times each week. 1 Tube 6   • BD INTEGRA SYRINGE 25G X 1\" 3 ML Misc USE 1 SYRINGE AS DIRECTED EVERY 30 DAYS 11 Each 0   • lidocaine (LIDODERM) 5 % PTCH   1   • oxycodone 20 MG/ML CONC Take 30 mg by mouth every four hours as needed. 20mg/ml solution       No current facility-administered medications for this visit.         The patient reports adherence to this regimen     Current supplements as per medication list.   Chronic narcotic pain medicines: yes ---followed closely by Dr. Telles, pain management.    Allergies: Morphine; Ativan [lorazepam]; Azathioprine sodium; Demerol; Elavil [amitriptyline]; Fentanyl; Kdc:fentanyl; Lyrica [pregabalin]; Methadone; Methotrexate; Methotrexate derivatives; Pseudoephedrine; Remicade [infliximab injection]; Roxanol [morphine sulfate]; Sulfa drugs; Sulfasalazine; " Tape; Tizanidine hydrochloride; Carafate [sucralfate]; and Celestone [betamethasone sodium phosphate]    Current social contact/activities: Active with family friends.  Exercise: Walking.  Is patient current with immunizations?  yes       She  reports that she has never smoked. She has never used smokeless tobacco. She reports that she drinks alcohol. She reports that she uses drugs.        DPA/Advanced directive: .has NO advanced directive -discussed and recommended.    ROS:    Gait: Uses no assistive device   Ostomy: yes   Other tubes: no   Amputations: no   Chronic oxygen use yes --nocturnal oxygen  Last eye exam: Within the past year  : Denies incontinence.       Screening:      Depression Screening    Little interest or pleasure in doing things?  0 - not at all  Feeling down, depressed , or hopeless? 0 - not at all  Patient Health Questionnaire Score: 0     If depressive symptoms identified deferred to follow up visit unless specifically addressed in assessment and plan.      Screening for Cognitive Impairment    Three Minute Recall (leader, season, table) 3/3    [place] clock face with all 12 numbers and set the hands to show 10 past 11.  Yes    Cognitive concerns identified deferred for follow up unless specifically addressed in assessment and plan.    Fall Risk Assessment    Has the patient had two or more falls in the last year or any fall with injury in the last year?  Yes    Safety Assessment    Throw rugs on floor.  Yes  Handrails on all stairs.  Yes  Good lighting in all hallways.  Yes  Difficulty hearing.  Yes  Patient counseled about all safety risks that were identified.    Functional Assessment ADLs    Are there any barriers preventing you from cooking for yourself or meeting nutritional needs?  No.    Are there any barriers preventing you from driving safely or obtaining transportation?  Yes.    Are there any barriers preventing you from using a telephone or calling for help?  No.    Are there any  barriers preventing you from shopping?  No.    Are there any barriers preventing you from taking care of your own finances?  No.    Are there any barriers preventing you from managing your medications?  No.    Are there any barriers preventing you from showering, bathing or dressing yourself?  No.    Are you currently engaging in any exercise or physical activity?  No.     What is your perception of your health?  Fair.             Patient Care Team:  Amanda Bazzi M.D. as PCP - General (Family Medicine)  Dianne Sandoval R.N. as Registered Nurse  Harvey Monahan M.D. (Cardiology)  Luc Borges M.D. (Otolaryngology)  [facility]  Pain Mangement:  Dr. Telles  Vascular:  Dr. Cevallos.  GI: Dr. Mahan  Card: [facility]  Pulm:  [facility]      Social History   Substance Use Topics   • Smoking status: Never Smoker   • Smokeless tobacco: Never Used      Comment: second hand smoke   • Alcohol use Yes      Comment: rare     Family History   Problem Relation Age of Onset   • Lung Disease Mother      copd   • Diabetes Father    • Heart Disease Father    • Diabetes Sister    • Cancer Maternal Aunt      breast     She  has a past medical history of Anesthesia; Anxiety; Arthritis; ASTHMA; Atrial fib/flut; Backpain; Breath shortness; Bronchitis; Chronic pain; Colostomy in place ([facility]) (10/14/2010); COPD (chronic obstructive pulmonary disease) ([facility]) (6/24/2014); COPD (chronic obstructive pulmonary disease) ([facility]) (6/24/2014); Crohn's disease of colon ([facility]); Dyspnea; History of cardiac murmur; Hypokalemia (6/24/2014); Indigestion; Infectious disease; Multiple falls (6/24/2014); Narcotic dependence ([facility]) (9/23/2009); Obstruction; Other specified disorder of intestines; Pain (7/11/13); Pneumonia; Postherpetic neuralgia (6/24/2014); Rosacea (6/24/2014); and Sleep apnea. She also has no past medical history of CAD (coronary artery disease); Liver disease; or Seizure disorder ([facility]).   Past Surgical History:   Procedure  "Laterality Date   • ILEOSTOMY  5/14/2014    Performed by Kevin Cruz M.D. at SURGERY [facility]   • MAMMOPLASTY REDUCTION  7/17/2013    Performed by Janey Burt M.D. at SURGERY [facility]   • HARDWARE REMOVAL NEURO  7/16/2012    Performed by KEELEY KIM at SURGERY [facility] OR[facility]   • GASTROSCOPY  10/4/2011    Performed by TYSON MARTINEZ at SURGERY SAME DAY [facility] ORS   • COLONOSCOPY  10/4/2011    Performed by TYSON MARTINEZ at SURGERY SAME DAY [facility] ORS   • DILATION AND CURETTAGE  9/24/2010    Performed by GAURAV ALY at SURGERY SAME DAY [facility] ORS   • ILEOSTOMY  11/11/2009    Performed by KEVIN CRUZ at SURGERY [facility]   • GASTROSCOPY WITH BIOPSY  11/22/08    Performed by NEGRITA HUYNH at SURGERY [facility]   • ABDOMINAL EXPLORATION     • CERVICAL DISK AND FUSION ANTERIOR     • COLON RESECTION     • FOOT SURGERY     • HAND SURGERY     • LUMBAR FUSION ANTERIOR     • LUMPECTOMY     • OTHER ABDOMINAL SURGERY      surgery for chrons disease   • PB REDUCTION OF LARGE BREAST       REVIEW OF SYSTEMS:  GENERAL: mild fatigue, no weight loss.  HEENT:  Ears--she has had ongoing ear problems past few months. Has tubes placed by Dr. Borges. He recently removed left and will be replacing it. The right one is stable.                 Eyes--no blurred vision, no discharge or pain                 Throat--No sore throat, no dysphagia, no hoarseness  CV:  No chest pain,dyspnea,palpitations or edema.  RESP:  No sob,cough,wheezing or hemoptysis.  GI: Chronic nausea. Chronic abdominal pain.  :  No dysuria, polyuria, hematuria, incontinence, or nocturia.  MS: Neck, back pain  NEURO:  No seizures, syncope, paralysis, tremor, or weakness.  SKIN: No new or concerning skin lesions or changes.   PSYCH: Mood fine.        Exam:     Blood pressure 118/64, pulse 76, temperature 35.9 °C (96.7 °F), resp. rate 14, height 1.84 m (6' 0.44\"), weight 78.9 kg (174 lb), SpO2 92 %. Body " mass index is 23.31 kg/m².    Hearing good.    Dentition good  Alert, oriented in no acute distress.  Eye contact is good, speech goal directed, affect calm  PHYSICAL EXAM;  GENERAL;  WN/WD, No acute distress.   HEENT:  Head normocephalic and atraumatic.    PERRLA, EOMI. No conjunctival injection, no icterus.     TM's --PE tube in place in right TM ,   nasal mucosa and mouth with no abnormalities.    Oropharynx is clear with no lesions.  NECK: Supple, no adenopathy or thyromegaly.  CV: RR. Normal S1,S2. No murmur, gallop or rub.          No JVD, Carotid pulses 2+ and sym. No bruits.  LUNGS:  Clear, no wheezes, rales or rhonchi.  BREASTS: previous breast reduction.  no masses or tenderness. No nipple discharge.  AXILLA:  No masses or tenderness.  ABDOMEN: Soft, NT, nondistended. Bowel sounds normal. No hepatosplenomegaly or masses. No rebound or guarding. No hernias. Ileostomy left mid abd.  : Normal external genitalia. Urethra unremarkable. Vaginal vault is normal. Cervix without lesions or tenderness. Uterus is normal size and nontender. No adnexal masses or tenderness.  EXTREMITIES:  No edema.   NEURO:  CN II-XII intact. Motor and sensation grossly intact.   SKIN: No rashes or abnormal lesions.  Psych: Mood and affect are appropriate.          Assessment and Plan. The following treatment and monitoring plan is recommended:   1. Medicare annual wellness visit, subsequent . Following diagnoses are addressed.     2. Muscle spasm . She does get muscle spasms and uses Valium occasionally. Her last refill was November.  was reviewed. She is cautioned not to use it with her narcotic.  diazePAM (VALIUM) 5 MG Tab   3. Chronic nausea . May use Kytril or Zofran as needed.  ondansetron (ZOFRAN ODT) 4 MG TABLET DISPERSIBLE   4. Crohn's disease of small and large intestines with complication (HCC). Stable.     5. Chronic pain syndrome . Continue follow-up with pain management.     6. Opioid type dependence, continuous  (CMS-[facility])     7. Ileostomy in place ([facility])     8. Chronic obstructive pulmonary disease,. Stable. Unspecified COPD type ([facility])     9. Sleep apnea, unspecified type. Stable. She uses nocturnal oxygen.     10. Paroxysmal atrial fibrillation ([facility]) . Controlled. She continues on aspirin and metoprolol.     11. Chronic respiratory failure with hypoxia ([facility]) . Nocturnal oxygen.     12. Chronic otitis media of both ears . Follow-up with Dr. Borges     13. Dysfunction of both eustachian tubes. Continue Flonase.     14,   screening for cervical cancer. Pap smear is collected today.  15.  Healthcare Maintenance. Counseled re: nutrition, activity and safety. Reviewed immunizations. She'll return after her 63th birthday. Prevnar vaccine.      Services needed: No services needed at this time  Health Care Screening recommendations as per orders if indicated.  Referrals offered: PT/OT/Nutrition counseling/Behavioral Health/Smoking cessation as per orders if indicated.    Discussion today about general wellness and lifestyle habits:    · Prevent falls and reduce trip hazards; Cautioned about securing or removing rugs.  · Have a working fire alarm and carbon monoxide detector;   · Engage in regular physical activity and social activities   ·       Follow-up: 3 months and prn.

## 2018-07-11 LAB — CYTOLOGY REG CYTOL: NORMAL

## 2018-07-27 DIAGNOSIS — J98.01 BRONCHOSPASM: ICD-10-CM

## 2018-07-27 RX ORDER — ALBUTEROL SULFATE 90 UG/1
2 AEROSOL, METERED RESPIRATORY (INHALATION) EVERY 6 HOURS PRN
Qty: 1 INHALER | Refills: 1 | Status: SHIPPED | OUTPATIENT
Start: 2018-07-27 | End: 2018-12-04

## 2018-08-28 ENCOUNTER — APPOINTMENT (RX ONLY)
Dept: URBAN - METROPOLITAN AREA CLINIC 20 | Facility: CLINIC | Age: 63
Setting detail: DERMATOLOGY
End: 2018-08-28

## 2018-08-28 DIAGNOSIS — L82.0 INFLAMED SEBORRHEIC KERATOSIS: ICD-10-CM

## 2018-08-28 DIAGNOSIS — L82.1 OTHER SEBORRHEIC KERATOSIS: ICD-10-CM

## 2018-08-28 DIAGNOSIS — L81.4 OTHER MELANIN HYPERPIGMENTATION: ICD-10-CM

## 2018-08-28 DIAGNOSIS — D22 MELANOCYTIC NEVI: ICD-10-CM

## 2018-08-28 DIAGNOSIS — D18.0 HEMANGIOMA: ICD-10-CM

## 2018-08-28 DIAGNOSIS — L57.0 ACTINIC KERATOSIS: ICD-10-CM

## 2018-08-28 DIAGNOSIS — L71.8 OTHER ROSACEA: ICD-10-CM

## 2018-08-28 DIAGNOSIS — L57.8 OTHER SKIN CHANGES DUE TO CHRONIC EXPOSURE TO NONIONIZING RADIATION: ICD-10-CM

## 2018-08-28 DIAGNOSIS — L85.3 XEROSIS CUTIS: ICD-10-CM

## 2018-08-28 PROBLEM — D22.5 MELANOCYTIC NEVI OF TRUNK: Status: ACTIVE | Noted: 2018-08-28

## 2018-08-28 PROBLEM — D18.01 HEMANGIOMA OF SKIN AND SUBCUTANEOUS TISSUE: Status: ACTIVE | Noted: 2018-08-28

## 2018-08-28 PROBLEM — D48.5 NEOPLASM OF UNCERTAIN BEHAVIOR OF SKIN: Status: ACTIVE | Noted: 2018-08-28

## 2018-08-28 PROCEDURE — 11101: CPT

## 2018-08-28 PROCEDURE — ? LIQUID NITROGEN

## 2018-08-28 PROCEDURE — ? PRESCRIPTION SAMPLES PROVIDED

## 2018-08-28 PROCEDURE — 99214 OFFICE O/P EST MOD 30 MIN: CPT | Mod: 25

## 2018-08-28 PROCEDURE — ? COUNSELING

## 2018-08-28 PROCEDURE — 11100: CPT | Mod: 59

## 2018-08-28 PROCEDURE — 17004 DESTROY PREMAL LESIONS 15/>: CPT

## 2018-08-28 PROCEDURE — ? BIOPSY BY SHAVE METHOD

## 2018-08-28 PROCEDURE — ? ADDITIONAL NOTES

## 2018-08-28 PROCEDURE — 17110 DESTRUCTION B9 LES UP TO 14: CPT | Mod: 59

## 2018-08-28 ASSESSMENT — LOCATION SIMPLE DESCRIPTION DERM
LOCATION SIMPLE: LEFT ELBOW
LOCATION SIMPLE: LEFT FOREHEAD
LOCATION SIMPLE: LEFT ANKLE
LOCATION SIMPLE: RIGHT HAND
LOCATION SIMPLE: LEFT THIGH
LOCATION SIMPLE: RIGHT FOREARM
LOCATION SIMPLE: RIGHT ZYGOMA
LOCATION SIMPLE: RIGHT FOREHEAD
LOCATION SIMPLE: LEFT UPPER ARM
LOCATION SIMPLE: LEFT EYEBROW
LOCATION SIMPLE: RIGHT PRETIBIAL REGION
LOCATION SIMPLE: RIGHT THIGH
LOCATION SIMPLE: LEFT HAND
LOCATION SIMPLE: LEFT FOREARM
LOCATION SIMPLE: CHEST
LOCATION SIMPLE: RIGHT CHEEK
LOCATION SIMPLE: LEFT CALF
LOCATION SIMPLE: LEFT PLANTAR SURFACE
LOCATION SIMPLE: RIGHT UPPER BACK
LOCATION SIMPLE: RIGHT BREAST
LOCATION SIMPLE: RIGHT PLANTAR SURFACE
LOCATION SIMPLE: RIGHT EAR
LOCATION SIMPLE: RIGHT UPPER ARM
LOCATION SIMPLE: LEFT ZYGOMA
LOCATION SIMPLE: LEFT PRETIBIAL REGION
LOCATION SIMPLE: LEFT CHEEK

## 2018-08-28 ASSESSMENT — LOCATION ZONE DERM
LOCATION ZONE: HAND
LOCATION ZONE: FEET
LOCATION ZONE: TRUNK
LOCATION ZONE: LEG
LOCATION ZONE: FACE
LOCATION ZONE: EAR
LOCATION ZONE: ARM

## 2018-08-28 ASSESSMENT — LOCATION DETAILED DESCRIPTION DERM
LOCATION DETAILED: RIGHT INFERIOR UPPER BACK
LOCATION DETAILED: RIGHT DISTAL DORSAL FOREARM
LOCATION DETAILED: RIGHT ANTERIOR DISTAL THIGH
LOCATION DETAILED: RIGHT MID-UPPER BACK
LOCATION DETAILED: LEFT MEDIAL SUPERIOR CHEST
LOCATION DETAILED: LEFT RADIAL DORSAL HAND
LOCATION DETAILED: RIGHT INFERIOR FOREHEAD
LOCATION DETAILED: RIGHT SUPERIOR HELIX
LOCATION DETAILED: LEFT PROXIMAL DORSAL FOREARM
LOCATION DETAILED: RIGHT INFERIOR CENTRAL MALAR CHEEK
LOCATION DETAILED: LEFT ANTERIOR DISTAL THIGH
LOCATION DETAILED: RIGHT MEDIAL BREAST 2-3:00 REGION
LOCATION DETAILED: LEFT DISTAL LATERAL CALF
LOCATION DETAILED: RIGHT PROXIMAL DORSAL FOREARM
LOCATION DETAILED: LEFT INFERIOR CENTRAL MALAR CHEEK
LOCATION DETAILED: LEFT ULNAR DORSAL HAND
LOCATION DETAILED: LEFT DISTAL PRETIBIAL REGION
LOCATION DETAILED: LEFT DISTAL DORSAL FOREARM
LOCATION DETAILED: RIGHT MEDIAL PLANTAR MIDFOOT
LOCATION DETAILED: LEFT LATERAL POSTERIOR ANKLE
LOCATION DETAILED: RIGHT MEDIAL FOREHEAD
LOCATION DETAILED: RIGHT PROXIMAL PRETIBIAL REGION
LOCATION DETAILED: RIGHT CENTRAL MALAR CHEEK
LOCATION DETAILED: RIGHT SUPERIOR CENTRAL MALAR CHEEK
LOCATION DETAILED: LEFT LATERAL PLANTAR MIDFOOT
LOCATION DETAILED: LEFT PROXIMAL LATERAL CALF
LOCATION DETAILED: RIGHT ANTERIOR PROXIMAL UPPER ARM
LOCATION DETAILED: LEFT LATERAL PROXIMAL PRETIBIAL REGION
LOCATION DETAILED: RIGHT CENTRAL ZYGOMA
LOCATION DETAILED: RIGHT ULNAR DORSAL HAND
LOCATION DETAILED: LEFT CENTRAL ZYGOMA
LOCATION DETAILED: LEFT CENTRAL EYEBROW
LOCATION DETAILED: RIGHT RADIAL DORSAL HAND
LOCATION DETAILED: RIGHT DISTAL PRETIBIAL REGION
LOCATION DETAILED: LEFT INFERIOR MEDIAL MALAR CHEEK
LOCATION DETAILED: RIGHT CENTRAL BUCCAL CHEEK
LOCATION DETAILED: LEFT ELBOW
LOCATION DETAILED: LEFT INFERIOR MEDIAL FOREHEAD
LOCATION DETAILED: LEFT PROXIMAL PRETIBIAL REGION
LOCATION DETAILED: LEFT ANTERIOR PROXIMAL UPPER ARM
LOCATION DETAILED: RIGHT SUPERIOR MEDIAL UPPER BACK

## 2018-08-28 NOTE — PROCEDURE: ADDITIONAL NOTES
Additional Notes: Suggested to use amlactin
Additional Notes: Refer for PDT as helpful with first txmt!

## 2018-08-28 NOTE — PROCEDURE: LIQUID NITROGEN
Consent: The patient's consent was obtained including but not limited to risks of crusting, scabbing, blistering, scarring, darker or lighter pigmentary change, recurrence, incomplete removal and infection.
Detail Level: Detailed
Duration Of Freeze Thaw-Cycle (Seconds): 4
Post-Care Instructions: I reviewed with the patient in detail post-care instructions. Patient is to wear sunprotection, and avoid picking at any of the treated lesions. Pt may apply Vaseline to crusted or scabbing areas.
Render Post-Care Instructions In Note?: no
Medical Necessity Clause: This procedure was medically necessary because the lesions that were treated were:
Medical Necessity Information: It is in your best interest to select a reason for this procedure from the list below. All of these items fulfill various CMS LCD requirements except the new and changing color options.

## 2018-08-28 NOTE — PROCEDURE: BIOPSY BY SHAVE METHOD
Was A Bandage Applied: Yes
Post-Care Instructions: I reviewed with the patient in detail post-care instructions. Patient is to keep the biopsy site dry overnight, and then apply bacitracin twice daily until healed. Patient may apply hydrogen peroxide soaks to remove any crusting.
Wound Care: Vaseline
X Size Of Lesion In Cm: 0
Anesthesia Type: 1% lidocaine with 1:100,000 epinephrine and 408mcg clindamycin/ml and a 1:10 solution of 8.4% sodium bicarbonate
Notification Instructions: Patient will be notified of biopsy results. However, patient instructed to call the office if not contacted within 2 weeks.
Detail Level: Detailed
Depth Of Biopsy: dermis
Render Post-Care Instructions In Note?: no
Size Of Lesion In Cm: 0.4
Hemostasis: Drysol and Electrocautery
Electrodesiccation Text: The wound bed was treated with electrodesiccation after the biopsy was performed.
Billing Type: Third-Party Bill
Lab Facility: 
Biopsy Type: H and E
Silver Nitrate Text: The wound bed was treated with silver nitrate after the biopsy was performed.
Lab: [id number]
Dressing: Band-Aid
Electrodesiccation And Curettage Text: The wound bed was treated with electrodesiccation and curettage after the biopsy was performed.
Anesthesia Volume In Cc: 0.5
Biopsy Method: Personna blade
Consent: Written consent was obtained and risks were reviewed including but not limited to scarring, infection, bleeding, scabbing, incomplete removal, nerve damage and allergy to anesthesia.
Type Of Destruction Used: Curettage
Size Of Lesion In Cm: 0.6

## 2018-09-10 ENCOUNTER — APPOINTMENT (RX ONLY)
Dept: URBAN - METROPOLITAN AREA CLINIC 20 | Facility: CLINIC | Age: 63
Setting detail: DERMATOLOGY
End: 2018-09-10

## 2018-09-10 PROBLEM — D04.12 CARCINOMA IN SITU OF SKIN OF LEFT EYELID, INCLUDING CANTHUS: Status: ACTIVE | Noted: 2018-09-10

## 2018-09-10 PROCEDURE — ? MOHS SURGERY PHONE CONSULTATION

## 2018-09-10 NOTE — PROCEDURE: MOHS SURGERY PHONE CONSULTATION
Referring Provider: Andria Colorado PA-C
Date Of Mohs Surgery: 10/03/2018
Does The Patient Have An Artificial Heart Valve?: No
Detail Level: Simple
Which Antibiotic Do They Take For Surgical Prophylaxis?: Amoxicillin (2 grams)
Additional Information?: hypertension due to pain
Patient Reported Location: lower left eyelid
Patient Preferred Phone Number: [phone]
Patient's Insurance: Medicare
Has The Patient Ever Received A Transplant?: Yes
Time Of Mohs Surgery: 1200
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

## 2018-10-03 ENCOUNTER — APPOINTMENT (RX ONLY)
Dept: URBAN - METROPOLITAN AREA CLINIC 36 | Facility: CLINIC | Age: 63
Setting detail: DERMATOLOGY
End: 2018-10-03

## 2018-10-03 PROBLEM — D04.39 CARCINOMA IN SITU OF SKIN OF OTHER PARTS OF FACE: Status: ACTIVE | Noted: 2018-10-03

## 2018-10-03 PROCEDURE — 13152 CMPLX RPR E/N/E/L 2.6-7.5 CM: CPT

## 2018-10-03 PROCEDURE — 17312 MOHS ADDL STAGE: CPT

## 2018-10-03 PROCEDURE — ? MOHS SURGERY

## 2018-10-03 PROCEDURE — 17311 MOHS 1 STAGE H/N/HF/G: CPT

## 2018-10-03 NOTE — PROCEDURE: MOHS SURGERY
Stage 2: Additional Anesthesia Type: 1% lidocaine with 1:100,000 epinephrine and 408mcg clindamycin/ml and a 1:10 solution of 8.4% sodium bicarbonate
Primary Defect Width In Cm (Final Defect Size - Required For Flaps/Grafts): 0.9
Oculoplastic Surgeon Procedure Text (B): After obtaining clear surgical margins the patient was sent to oculoplastics for surgical repair.  The patient understands they will receive post-surgical care and follow-up from the referring physician's office.
Consent 1/Introductory Paragraph: The rationale for Mohs was explained to the patient and consent was obtained. The risks, benefits and alternatives to therapy were discussed in detail. Specifically, the risks of infection, scarring, bleeding, prolonged wound healing, incomplete removal, allergy to anesthesia, nerve injury and recurrence were addressed. Prior to the procedure, the treatment site was clearly identified and confirmed by the patient. All components of Universal Protocol/PAUSE Rule completed.
Postop Diagnosis: same
Detail Level: Detailed
Transposition Flap Text: The defect edges were debeveled with a #15 scalpel blade.  Given the location of the defect and the proximity to free margins a transposition flap was deemed most appropriate.  Using a sterile surgical marker, an appropriate transposition flap was drawn incorporating the defect.    The area thus outlined was incised deep to adipose tissue with a #15 scalpel blade.  The skin margins were undermined to an appropriate distance in all directions utilizing iris scissors.
Consent (Nose)/Introductory Paragraph: The rationale for Mohs was explained to the patient and consent was obtained. The risks, benefits and alternatives to therapy were discussed in detail. Specifically, the risks of nasal deformity, changes in the flow of air through the nose, infection, scarring, bleeding, prolonged wound healing, incomplete removal, allergy to anesthesia, nerve injury and recurrence were addressed. Prior to the procedure, the treatment site was clearly identified and confirmed by the patient. All components of Universal Protocol/PAUSE Rule completed.
Stage 5: Number Of Blocks?: 0
Mid-Level Procedure Text (A): After obtaining clear surgical margins the patient was sent to a mid-level provider for surgical repair.  The patient understands they will receive post-surgical care and follow-up from the mid-level provider.
Area L Indication Text: Tumors in this location are included in Area L (trunk and extremities).  Mohs surgery is indicated for larger tumors, or tumors with aggressive histologic features, in these anatomic locations.
Dressing (No Sutures): dry sterile dressing
Keystone Flap Text: The defect edges were debeveled with a #15 scalpel blade.  Given the location of the defect, shape of the defect a keystone flap was deemed most appropriate.  Using a sterile surgical marker, an appropriate keystone flap was drawn incorporating the defect, outlining the appropriate donor tissue and placing the expected incisions within the relaxed skin tension lines where possible. The area thus outlined was incised deep to adipose tissue with a #15 scalpel blade.  The skin margins were undermined to an appropriate distance in all directions around the primary defect and laterally outward around the flap utilizing iris scissors.
Referring Physician (Optional): ANNE [place]
Plastic Surgeon Procedure Text (A): After obtaining clear surgical margins the patient was sent to plastics for surgical repair.  The patient understands they will receive post-surgical care and follow-up from the referring physician's office.
Mohs Histo Method Verbiage: Each section was then chromacoded and processed in the Mohs lab using the Mohs protocol and submitted for frozen section.
Xenograft Text: The defect edges were debeveled with a #15 scalpel blade.  Given the location of the defect, shape of the defect and the proximity to free margins a xenograft was deemed most appropriate.  The graft was then trimmed to fit the size of the defect.  The graft was then placed in the primary defect and oriented appropriately.
Composite Graft Text: The defect edges were debeveled with a #15 scalpel blade.  Given the location of the defect, shape of the defect, the proximity to free margins and the fact the defect was full thickness a composite graft was deemed most appropriate.  The defect was outline and then transferred to the donor site.  A full thickness graft was then excised from the donor site. The graft was then placed in the primary defect, oriented appropriately and then sutured into place.  The secondary defect was then repaired using a primary closure.
Repair Performed By Another Provider Text (Leave Blank If You Do Not Want): After obtaining clear surgical margins the defect was repaired by another provider.
Island Pedicle Flap With Canthal Suspension Text: The defect edges were debeveled with a #15 scalpel blade.  Given the location of the defect, shape of the defect and the proximity to free margins an island pedicle advancement flap was deemed most appropriate.  Using a sterile surgical marker, an appropriate advancement flap was drawn incorporating the defect, outlining the appropriate donor tissue and placing the expected incisions within the relaxed skin tension lines where possible. The area thus outlined was incised deep to adipose tissue with a #15 scalpel blade.  The skin margins were undermined to an appropriate distance in all directions around the primary defect and laterally outward around the island pedicle utilizing iris scissors.  There was minimal undermining beneath the pedicle flap. A suspension suture was placed in the canthal tendon to prevent tension and prevent ectropion.
Quadrant Reporting?: no
Ear Wedge Repair Text: A wedge excision was completed by carrying down an excision through the full thickness of the ear and cartilage with an inward facing Burow's triangle. The wound was then closed in a layered fashion.
Show Asc Variables: Yes
M-Plasty Complex Repair Preamble Text (Leave Blank If You Do Not Want): Extensive wide undermining was performed.
Epidermal Closure Graft Donor Site (Optional): simple interrupted
Body Location Override (Optional - Billing Will Still Be Based On Selected Body Map Location If Applicable): extended onto lower eyelid
Unna Boot Text: An Unna boot was placed to help immobilize the limb and facilitate more rapid healing.
Complex Repair Preamble Text (Leave Blank If You Do Not Want): Extensive wide undermining was performed at least 2 cm in all directions.
Double M-Plasty Intermediate Repair Preamble Text (Leave Blank If You Do Not Want): Undermining was performed with blunt dissection.
Stage 11: Additional Anesthesia Type: 1% lidocaine with epinephrine
Localized Dermabrasion With Wire Brush Text: The patient was draped in routine manner.  Localized dermabrasion using 3 x 17 mm wire brush was performed in routine manner to papillary dermis. This spot dermabrasion is being performed to complete skin cancer reconstruction. It also will eliminate the other sun damaged precancerous cells that are known to be part of the regional effect of a lifetime's worth of sun exposure. This localized dermabrasion is therapeutic and should not be considered cosmetic in any regard.
Unique Flap 3 Text: The defect edges were debeveled with a #15 scalpel blade.  Given the location of the defect, shape of the defect and the proximity to free margins a Mercedes (double advancement flap) was deemed most appropriate.  Using a sterile surgical marker, the appropriate transposition flaps were drawn incorporating the defect and placing the expected incisions within the relaxed skin tension lines where possible.    The area thus outlined was incised deep to adipose tissue with a #15 scalpel blade.  The skin margins were undermined to an appropriate distance in all directions utilizing iris scissors.  Hemostasis was achieved with electrocautery.  The flaps were then advanced into the defect and anchored with interrupted buried subcutaneous sutures.
Inflammation Suggestive Of Cancer Camouflage Histology Text: There was a dense lymphocytic infiltrate which prevented adequate histologic evaluation of adjacent structures.
Repair Type: Complex Repair
Surgeon Performing Repair (Optional): Ivanna
Otolaryngologist Procedure Text (B): After obtaining clear surgical margins the patient was sent to otolaryngology for surgical repair.  The patient understands they will receive post-surgical care and follow-up from the referring physician's office.
Alar Island Pedicle Flap Text: The defect edges were debeveled with a #15 scalpel blade.  Given the location of the defect, shape of the defect and the proximity to the alar rim an island pedicle advancement flap was deemed most appropriate.  Using a sterile surgical marker, an appropriate advancement flap was drawn incorporating the defect, outlining the appropriate donor tissue and placing the expected incisions within the nasal ala running parallel to the alar rim. The area thus outlined was incised with a #15 scalpel blade.  The skin margins were undermined minimally to an appropriate distance in all directions around the primary defect and laterally outward around the island pedicle utilizing iris scissors.  There was minimal undermining beneath the pedicle flap.
Unique Flap 3 Name: Mercedes Flap
Bi-Rhombic Flap Text: The defect edges were debeveled with a #15 scalpel blade.  Given the location of the defect and the proximity to free margins a bi-rhombic flap was deemed most appropriate.  Using a sterile surgical marker, an appropriate rhombic flap was drawn incorporating the defect. The area thus outlined was incised deep to adipose tissue with a #15 scalpel blade.  The skin margins were undermined to an appropriate distance in all directions utilizing iris scissors.
Mohs Rapid Report Verbiage: The area of clinically evident tumor was marked with skin marking ink and appropriately hatched.  The initial incision was made following the Mohs approach through the skin.  The specimen was taken to the lab, divided into the necessary number of pieces, chromacoded and processed according to the Mohs protocol.  This was repeated in successive stages until a tumor free defect was achieved.
Mercedes Flap Text: The defect edges were debeveled with a #15 scalpel blade.  Given the location of the defect, shape of the defect and the proximity to free margins a Mercedes flap was deemed most appropriate.  Using a sterile surgical marker, an appropriate advancement flap was drawn incorporating the defect and placing the expected incisions within the relaxed skin tension lines where possible. The area thus outlined was incised deep to adipose tissue with a #15 scalpel blade.  The skin margins were undermined to an appropriate distance in all directions utilizing iris scissors.
Asc Procedure Text (B): After obtaining clear surgical margins the patient was sent to an ASC for surgical repair.  The patient understands they will receive post-surgical care and follow-up from the ASC physician.
Burow's Advancement Flap Text: The defect edges were debeveled with a #15 scalpel blade.  Given the location of the defect and the proximity to free margins a Burow's advancement flap was deemed most appropriate.  Using a sterile surgical marker, the appropriate advancement flap was drawn incorporating the defect and placing the expected incisions within the relaxed skin tension lines where possible.    The area thus outlined was incised deep to adipose tissue with a #15 scalpel blade.  The skin margins were undermined to an appropriate distance in all directions utilizing iris scissors.
Anesthesia Type: 0.5% lidocaine with 1:200,000 epinephrine and a 1:10 solution of 8.4% sodium bicarbonate and 408mcg clindamycin/ml
Bcc Histology Text: There were numerous aggregates of basaloid cells.
W Plasty Text: The lesion was extirpated to the level of the fat with a #15 scalpel blade.  Given the location of the defect, shape of the defect and the proximity to free margins a W-plasty was deemed most appropriate for repair.  Using a sterile surgical marker, the appropriate transposition arms of the W-plasty were drawn incorporating the defect and placing the expected incisions within the relaxed skin tension lines where possible.    The area thus outlined was incised deep to adipose tissue with a #15 scalpel blade.  The skin margins were undermined to an appropriate distance in all directions utilizing iris scissors.  The opposing transposition arms were then transposed into place in opposite direction and anchored with interrupted buried subcutaneous sutures.
Non-Graft Cartilage Fenestration Text: The cartilage was fenestrated with a 2mm punch biopsy to help facilitate healing.
Rotation Flap Text: The defect edges were debeveled with a #15 scalpel blade.  Given the location of the defect, shape of the defect and the proximity to free margins a rotation flap was deemed most appropriate.  Using a sterile surgical marker, an appropriate rotation flap was drawn incorporating the defect and placing the expected incisions within the relaxed skin tension lines where possible.    The area thus outlined was incised deep to adipose tissue with a #15 scalpel blade.  The skin margins were undermined to an appropriate distance in all directions utilizing iris scissors.
Hemostasis: Electrocautery
Simple / Intermediate / Complex Repair - Final Wound Length In Cm: 2.9
Melolabial Interpolation Flap Text: A decision was made to reconstruct the defect utilizing an interpolation axial flap and a staged reconstruction.  A telfa template was made of the defect.  This telfa template was then used to outline the melolabial interpolation flap.  The donor area for the pedicle flap was then injected with anesthesia.  The flap was excised through the skin and subcutaneous tissue down to the layer of the underlying musculature.  The pedicle flap was carefully excised within this deep plane to maintain its blood supply.  The edges of the donor site were undermined.   The donor site was closed in a primary fashion.  The pedicle was then rotated into position and sutured.  Once the tube was sutured into place, adequate blood supply was confirmed with blanching and refill.  The pedicle was then wrapped with xeroform gauze and dressed appropriately with a telfa and gauze bandage to ensure continued blood supply and protect the attached pedicle.
Anesthesia Volume In Cc: 2.3
Mohs Method Verbiage: An incision at a 45 degree angle following the standard Mohs approach was done and the specimen was harvested as a microscopic controlled layer.
Ear Star Wedge Flap Text: The defect edges were debeveled with a #15 blade scalpel.  Given the location of the defect and the proximity to free margins (helical rim) an ear star wedge flap was deemed most appropriate.  Using a sterile surgical marker, the appropriate flap was drawn incorporating the defect and placing the expected incisions between the helical rim and antihelix where possible.  The area thus outlined was incised through and through with a #15 scalpel blade.
Initial Size Of Lesion: 1
Closure 2 Information: This tab is for additional flaps and grafts, including complex repair and grafts and complex repair and flaps. You can also specify a different location for the additional defect, if the location is the same you do not need to select a new one. We will insert the automated text for the repair you select below just as we do for solitary flaps and grafts. Please note that at this time if you select a location with a different insurance zone you will need to override the ICD10 and CPT if appropriate.
Unique Flap 2 Text: A decision was made to reconstruct the defect utilizing a Peng Flap (Bilateral Advancement Rotation Flap). Given the location of the defect and the proximity to free margins, this flap was deemed most appropriate.  Using a sterile surgical marker, the appropriate rotation flaps were drawn incorporating the defect and placing the expected incisions within the relaxed skin tension lines where possible.    The area thus outlined was incised deep to adipose tissue with a #15 scalpel blade.  The skin margins were undermined to an appropriate distance in all directions utilizing iris scissors.
Home Suture Removal Text: Patient was provided instructions on removing sutures and will remove their sutures at home.  If they have any questions or difficulties they will call the office.
Banner Transposition Flap Text: The defect edges were debeveled with a #15 scalpel blade.  Given the location of the defect and the proximity to free margins a Banner transposition flap was deemed most appropriate.  Using a sterile surgical marker, an appropriate flap drawn around the defect. The area thus outlined was incised deep to adipose tissue with a #15 scalpel blade.  The skin margins were undermined to an appropriate distance in all directions utilizing iris scissors.
Consent (Marginal Mandibular)/Introductory Paragraph: The rationale for Mohs was explained to the patient and consent was obtained. The risks, benefits and alternatives to therapy were discussed in detail. Specifically, the risks of damage to the marginal mandibular branch of the facial nerve, infection, scarring, bleeding, prolonged wound healing, incomplete removal, allergy to anesthesia, and recurrence were addressed. Prior to the procedure, the treatment site was clearly identified and confirmed by the patient. All components of Universal Protocol/PAUSE Rule completed.
Full Thickness Lip Wedge Repair (Flap) Text: Given the location of the defect and the proximity to free margins a full thickness wedge repair was deemed most appropriate.  Using a sterile surgical marker, the appropriate repair was drawn incorporating the defect and placing the expected incisions perpendicular to the vermilion border.  The vermilion border was also meticulously outlined to ensure appropriate reapproximation during the repair.  The area thus outlined was incised through and through with a #15 scalpel blade.  The muscularis and dermis were reaproximated with deep sutures following hemostasis. Care was taken to realign the vermilion border before proceeding with the superficial closure.  Once the vermilion was realigned the superfical and mucosal closure was finished.
Consent (Scalp)/Introductory Paragraph: The rationale for Mohs was explained to the patient and consent was obtained. The risks, benefits and alternatives to therapy were discussed in detail. Specifically, the risks of changes in hair growth pattern secondary to repair, infection, scarring, bleeding, prolonged wound healing, incomplete removal, allergy to anesthesia, nerve injury and recurrence were addressed. Prior to the procedure, the treatment site was clearly identified and confirmed by the patient. All components of Universal Protocol/PAUSE Rule completed.
Spiral Flap Text: The defect edges were debeveled with a #15 scalpel blade.  Given the location of the defect, shape of the defect and the proximity to free margins a spiral flap was deemed most appropriate.  Using a sterile surgical marker, an appropriate rotation flap was drawn incorporating the defect and placing the expected incisions within the relaxed skin tension lines where possible. The area thus outlined was incised deep to adipose tissue with a #15 scalpel blade.  The skin margins were undermined to an appropriate distance in all directions utilizing iris scissors.
Unique Flap 2 Name: Peng Flap
Purse String (Simple) Text: Given the location of the defect and the characteristics of the surrounding skin a purse string closure was deemed most appropriate.  Undermining was performed circumfirentially around the surgical defect.  A purse string suture was then placed and tightened.
Primary Defect Length In Cm (Final Defect Size - Required For Flaps/Grafts): 2.4
Consent Type: Consent 1 (Standard)
Dermal Autograft Text: The defect edges were debeveled with a #15 scalpel blade.  Given the location of the defect, shape of the defect and the proximity to free margins a dermal autograft was deemed most appropriate.  Using a sterile surgical marker, the primary defect shape was transferred to the donor site. The area thus outlined was incised deep to adipose tissue with a #15 scalpel blade.  The harvested graft was then trimmed of adipose and epidermal tissue until only dermis was left.  The skin graft was then placed in the primary defect and oriented appropriately.
Advancement-Rotation Flap Text: The defect edges were debeveled with a #15 scalpel blade.  Given the location of the defect, shape of the defect and the proximity to free margins an advancement-rotation flap was deemed most appropriate.  Using a sterile surgical marker, an appropriate flap was drawn incorporating the defect and placing the expected incisions within the relaxed skin tension lines where possible. The area thus outlined was incised deep to adipose tissue with a #15 scalpel blade.  The skin margins were undermined to an appropriate distance in all directions utilizing iris scissors.
Complex Repair And Graft Additional Text (Will Appearing After The Standard Complex Repair Text): The complex repair was not sufficient to completely close the primary defect. The remaining additional defect was repaired with the graft mentioned below.
Alternatives Discussed Intro (Do Not Add Period): I discussed alternative treatments to Mohs surgery and specifically discussed the risks and benefits of
Closure 3 Information: This tab is for additional flaps and grafts above and beyond our usual structured repairs.  Please note if you enter information here it will not currently bill and you will need to add the billing information manually.
Interpolation Flap Text: A decision was made to reconstruct the defect utilizing an interpolation axial flap and a staged reconstruction.  A telfa template was made of the defect.  This telfa template was then used to outline the interpolation flap.  The donor area for the pedicle flap was then injected with anesthesia.  The flap was excised through the skin and subcutaneous tissue down to the layer of the underlying musculature.  The interpolation flap was carefully excised within this deep plane to maintain its blood supply.  The edges of the donor site were undermined.   The donor site was closed in a primary fashion.  The pedicle was then rotated into position and sutured.  Once the tube was sutured into place, adequate blood supply was confirmed with blanching and refill.  The pedicle was then wrapped with xeroform gauze and dressed appropriately with a telfa and gauze bandage to ensure continued blood supply and protect the attached pedicle.
Crescentic Advancement Flap Text: The defect edges were debeveled with a #15 scalpel blade.  Given the location of the defect and the proximity to free margins a crescentic advancement flap was deemed most appropriate.  Using a sterile surgical marker, the appropriate advancement flap was drawn incorporating the defect and placing the expected incisions within the relaxed skin tension lines where possible.    The area thus outlined was incised deep to adipose tissue with a #15 scalpel blade.  The skin margins were undermined to an appropriate distance in all directions utilizing iris scissors.
Eye Protection Verbiage: Before proceeding with the stage, a plastic scleral shield was inserted. The globe was anesthetized with a few drops of 1% lidocaine with 1:100,000 epinephrine. Then, an appropriate sized scleral shield was chosen and coated with lacrilube ointment. The shield was gently inserted and left in place for the duration of each stage. After the stage was completed, the shield was gently removed.
Partial Purse String (Intermediate) Text: Given the location of the defect and the characteristics of the surrounding skin an intermediate purse string closure was deemed most appropriate.  Undermining was performed circumfirentially around the surgical defect.  A purse string suture was then placed and tightened. Wound tension only allowed a partial closure of the circular defect.
O-T Plasty Text: The defect edges were debeveled with a #15 scalpel blade.  Given the location of the defect, shape of the defect and the proximity to free margins an O-T plasty was deemed most appropriate.  Using a sterile surgical marker, an appropriate O-T plasty was drawn incorporating the defect and placing the expected incisions within the relaxed skin tension lines where possible.    The area thus outlined was incised deep to adipose tissue with a #15 scalpel blade.  The skin margins were undermined to an appropriate distance in all directions utilizing iris scissors.
Graft Donor Site Epidermal Sutures (Optional): 5-0 Ethibond
Repair Anesthesia Method: local infiltration
Donor Site Anesthesia Type: same as repair anesthesia
Area M Indication Text: Tumors in this location are included in Area M (cheek, forehead, scalp, neck, jawline and pretibial skin).  Mohs surgery is indicated for tumors in these anatomic locations.
Rhombic Flap Text: The defect edges were debeveled with a #15 scalpel blade.  Given the location of the defect and the proximity to free margins a rhombic flap was deemed most appropriate.  Using a sterile surgical marker, an appropriate rhombic flap was drawn incorporating the defect.    The area thus outlined was incised deep to adipose tissue with a #15 scalpel blade.  The skin margins were undermined to an appropriate distance in all directions utilizing iris scissors.
Z Plasty Text: The lesion was extirpated to the level of the fat with a #15 scalpel blade.  Given the location of the defect, shape of the defect and the proximity to free margins a Z-plasty was deemed most appropriate for repair.  Using a sterile surgical marker, the appropriate transposition arms of the Z-plasty were drawn incorporating the defect and placing the expected incisions within the relaxed skin tension lines where possible.    The area thus outlined was incised deep to adipose tissue with a #15 scalpel blade.  The skin margins were undermined to an appropriate distance in all directions utilizing iris scissors.  The opposing transposition arms were then transposed into place in opposite direction and anchored with interrupted buried subcutaneous sutures.
S Plasty Text: Given the location and shape of the defect, and the orientation of relaxed skin tension lines, an S-plasty was deemed most appropriate for repair.  Using a sterile surgical marker, the appropriate outline of the S-plasty was drawn, incorporating the defect and placing the expected incisions within the relaxed skin tension lines where possible.  The area thus outlined was incised deep to adipose tissue with a #15 scalpel blade.  The skin margins were undermined to an appropriate distance in all directions utilizing iris scissors. The skin flaps were advanced over the defect.  The opposing margins were then approximated with interrupted buried subcutaneous sutures.
Previous Accession (Optional): [id number]
Medical Necessity Statement: Based on my medical judgement, Mohs surgery is the most appropriate treatment for this cancer compared to other treatments.
Island Pedicle Flap Text: The defect edges were debeveled with a #15 scalpel blade.  Given the location of the defect, shape of the defect and the proximity to free margins an island pedicle advancement flap was deemed most appropriate.  Using a sterile surgical marker, an appropriate advancement flap was drawn incorporating the defect, outlining the appropriate donor tissue and placing the expected incisions within the relaxed skin tension lines where possible.    The area thus outlined was incised deep to adipose tissue with a #15 scalpel blade.  The skin margins were undermined to an appropriate distance in all directions around the primary defect and laterally outward around the island pedicle utilizing iris scissors.  There was minimal undermining beneath the pedicle flap.
Cartilage Graft Text: The defect edges were debeveled with a #15 scalpel blade.  Given the location of the defect, shape of the defect, the fact the defect involved a full thickness cartilage defect a cartilage graft was deemed most appropriate.  An appropriate donor site was identified, cleansed, and anesthetized. The cartilage graft was then harvested and transferred to the recipient site, oriented appropriately and then sutured into place.  The secondary defect was then repaired using a primary closure.
Consent (Temporal Branch)/Introductory Paragraph: The rationale for Mohs was explained to the patient and consent was obtained. The risks, benefits and alternatives to therapy were discussed in detail. Specifically, the risks of damage to the temporal branch of the facial nerve, infection, scarring, bleeding, prolonged wound healing, incomplete removal, allergy to anesthesia, and recurrence were addressed. Prior to the procedure, the treatment site was clearly identified and confirmed by the patient. All components of Universal Protocol/PAUSE Rule completed.
Bcc Infiltrative Histology Text: There were numerous aggregates of basaloid cells demonstrating an infiltrative pattern.
Melolabial Transposition Flap Text: The defect edges were debeveled with a #15 scalpel blade.  Given the location of the defect and the proximity to free margins a melolabial flap was deemed most appropriate.  Using a sterile surgical marker, an appropriate melolabial transposition flap was drawn incorporating the defect.    The area thus outlined was incised deep to adipose tissue with a #15 scalpel blade.  The skin margins were undermined to an appropriate distance in all directions utilizing iris scissors.
Bilobed Flap Text: The defect edges were debeveled with a #15 scalpel blade.  Given the location of the defect and the proximity to free margins a bilobe flap was deemed most appropriate.  Using a sterile surgical marker, an appropriate bilobe flap drawn around the defect.    The area thus outlined was incised deep to adipose tissue with a #15 scalpel blade.  The skin margins were undermined to an appropriate distance in all directions utilizing iris scissors.
Consent (Spinal Accessory)/Introductory Paragraph: The rationale for Mohs was explained to the patient and consent was obtained. The risks, benefits and alternatives to therapy were discussed in detail. Specifically, the risks of damage to the spinal accessory nerve, infection, scarring, bleeding, prolonged wound healing, incomplete removal, allergy to anesthesia, and recurrence were addressed. Prior to the procedure, the treatment site was clearly identified and confirmed by the patient. All components of Universal Protocol/PAUSE Rule completed.
Paramedian Forehead Flap Text: A decision was made to reconstruct the defect utilizing an interpolation axial flap and a staged reconstruction.  A telfa template was made of the defect.  This telfa template was then used to outline the paramedian forehead pedicle flap.  The donor area for the pedicle flap was then injected with anesthesia.  The flap was excised through the skin and subcutaneous tissue down to the layer of the underlying musculature.  The pedicle flap was carefully excised within this deep plane to maintain its blood supply.  The edges of the donor site were undermined.   The donor site was closed in a primary fashion.  The pedicle was then rotated into position and sutured.  Once the tube was sutured into place, adequate blood supply was confirmed with blanching and refill.  The pedicle was then wrapped with xeroform gauze and dressed appropriately with a telfa and gauze bandage to ensure continued blood supply and protect the attached pedicle.
Consent (Lip)/Introductory Paragraph: The rationale for Mohs was explained to the patient and consent was obtained. The risks, benefits and alternatives to therapy were discussed in detail. Specifically, the risks of lip deformity, changes in the oral aperture, infection, scarring, bleeding, prolonged wound healing, incomplete removal, allergy to anesthesia, nerve injury and recurrence were addressed. Prior to the procedure, the treatment site was clearly identified and confirmed by the patient. All components of Universal Protocol/PAUSE Rule completed.
Complex Repair And Flap Additional Text (Will Appearing After The Standard Complex Repair Text): The complex repair was not sufficient to completely close the primary defect. The remaining additional defect was repaired with the flap mentioned below.
Cheek-To-Nose Interpolation Flap Text: A decision was made to reconstruct the defect utilizing an interpolation axial flap and a staged reconstruction.  A telfa template was made of the defect.  This telfa template was then used to outline the Cheek-To-Nose Interpolation flap.  The donor area for the pedicle flap was then injected with anesthesia.  The flap was excised through the skin and subcutaneous tissue down to the layer of the underlying musculature.  The interpolation flap was carefully excised within this deep plane to maintain its blood supply.  The edges of the donor site were undermined.   The donor site was closed in a primary fashion.  The pedicle was then rotated into position and sutured.  Once the tube was sutured into place, adequate blood supply was confirmed with blanching and refill.  The pedicle was then wrapped with xeroform gauze and dressed appropriately with a telfa and gauze bandage to ensure continued blood supply and protect the attached pedicle.
Unique Flap 1 Text: A decision was made to reconstruct the defect utilizing a myocutaneous Island pedicle Flap based on the levator labii superioris muscle.  A telfa template was made of the defect.  This telfa template was then used to outline the myocutaneous flap, based along the meilolabial fold.  The donor area for the pedicle flap was then injected with anesthesia.  The flap was excised through the skin and subcutaneous tissue down to the layer of the underlying musculature.  The myocutaneous flap was carefully excised within this deep plane to maintain its blood supply. Based on the muscle. The edges of the donor site were undermined.   The donor site was closed in a primary fashion to the point of transposition.  The pedicle was then transposed into position and sutured.  Once the flap was sutured into place, adequate blood supply was confirmed with blanching and refill.
Star Wedge Flap Text: The defect edges were debeveled with a #15 scalpel blade.  Given the location of the defect, shape of the defect and the proximity to free margins a star wedge flap was deemed most appropriate.  Using a sterile surgical marker, an appropriate rotation flap was drawn incorporating the defect and placing the expected incisions within the relaxed skin tension lines where possible. The area thus outlined was incised deep to adipose tissue with a #15 scalpel blade.  The skin margins were undermined to an appropriate distance in all directions utilizing iris scissors.
Number Of Stages: 2
Anesthesia Volume In Cc: 6
Unique Flap 1 Name: Myocutaneous Island pedicle Flap
Wound Care (No Sutures): Petrolatum
Mohs Case Number: 
Double Island Pedicle Flap Text: The defect edges were debeveled with a #15 scalpel blade.  Given the location of the defect, shape of the defect and the proximity to free margins a double island pedicle advancement flap was deemed most appropriate.  Using a sterile surgical marker, an appropriate advancement flap was drawn incorporating the defect, outlining the appropriate donor tissue and placing the expected incisions within the relaxed skin tension lines where possible.    The area thus outlined was incised deep to adipose tissue with a #15 scalpel blade.  The skin margins were undermined to an appropriate distance in all directions around the primary defect and laterally outward around the island pedicle utilizing iris scissors.  There was minimal undermining beneath the pedicle flap.
Purse String (Intermediate) Text: Given the location of the defect and the characteristics of the surrounding skin a purse string intermediate closure was deemed most appropriate.  Undermining was performed circumfirentially around the surgical defect.  A purse string suture was then placed and tightened.
Suture Removal: 7 days
V-Y Flap Text: The defect edges were debeveled with a #15 scalpel blade.  Given the location of the defect, shape of the defect and the proximity to free margins a V-Y flap was deemed most appropriate.  Using a sterile surgical marker, an appropriate advancement flap was drawn incorporating the defect and placing the expected incisions within the relaxed skin tension lines where possible.    The area thus outlined was incised deep to adipose tissue with a #15 scalpel blade.  The skin margins were undermined to an appropriate distance in all directions utilizing iris scissors.
Skin Substitute Text: The defect edges were debeveled with a #15 scalpel blade.  Given the location of the defect, shape of the defect and the proximity to free margins a skin substitute graft was deemed most appropriate.  The graft material was trimmed to fit the size of the defect. The graft was then placed in the primary defect and oriented appropriately.
Area H Indication Text: Tumors in this location are included in Area H (eyelids, eyebrows, nose, lips, chin, ear, pre-auricular, post-auricular, temple, genitalia, hands, feet, ankles and areola).  Tissue conservation is critical in these anatomic locations.
Consent 3/Introductory Paragraph: I gave the patient a chance to ask questions they had about the procedure.  Following this I explained the Mohs procedure and consent was obtained. The risks, benefits and alternatives to therapy were discussed in detail. Specifically, the risks of infection, scarring, bleeding, prolonged wound healing, incomplete removal, allergy to anesthesia, nerve injury and recurrence were addressed. Prior to the procedure, the treatment site was clearly identified and confirmed by the patient. All components of Universal Protocol/PAUSE Rule completed.
Wound Care: Aquaphor
A-T Advancement Flap Text: The defect edges were debeveled with a #15 scalpel blade.  Given the location of the defect, shape of the defect and the proximity to free margins an A-T advancement flap was deemed most appropriate.  Using a sterile surgical marker, an appropriate advancement flap was drawn incorporating the defect and placing the expected incisions within the relaxed skin tension lines where possible.    The area thus outlined was incised deep to adipose tissue with a #15 scalpel blade.  The skin margins were undermined to an appropriate distance in all directions utilizing iris scissors.
Epidermal Sutures: 5-0 Ethilon
Split-Thickness Skin Graft Text: The defect edges were debeveled with a #15 scalpel blade.  Given the location of the defect, shape of the defect and the proximity to free margins a split thickness skin graft was deemed most appropriate.  Using a sterile surgical marker, the primary defect shape was transferred to the donor site. The split thickness graft was then harvested.  The skin graft was then placed in the primary defect and oriented appropriately.
Posterior Auricular Interpolation Flap Text: A decision was made to reconstruct the defect utilizing an interpolation axial flap and a staged reconstruction.  A telfa template was made of the defect.  This telfa template was then used to outline the posterior auricular interpolation flap.  The donor area for the pedicle flap was then injected with anesthesia.  The flap was excised through the skin and subcutaneous tissue down to the layer of the underlying musculature.  The pedicle flap was carefully excised within this deep plane to maintain its blood supply.  The edges of the donor site were undermined.   The donor site was closed in a primary fashion.  The pedicle was then rotated into position and sutured.  Once the tube was sutured into place, adequate blood supply was confirmed with blanching and refill.  The pedicle was then wrapped with xeroform gauze and dressed appropriately with a telfa and gauze bandage to ensure continued blood supply and protect the attached pedicle.
Manual Repair Warning Statement: We plan on removing the manually selected variable below in favor of our much easier automatic structured text blocks found in the previous tab. We decided to do this to help make the flow better and give you the full power of structured data. Manual selection is never going to be ideal in our platform and I would encourage you to avoid using manual selection from this point on, especially since I will be sunsetting this feature. It is important that you do one of two things with the customized text below. First, you can save all of the text in a word file so you can have it for future reference. Second, transfer the text to the appropriate area in the Library tab. Lastly, if there is a flap or graft type which we do not have you need to let us know right away so I can add it in before the variable is hidden. No need to panic, we plan to give you roughly 6 months to make the change.
Consent (Near Eyelid Margin)/Introductory Paragraph: The rationale for Mohs was explained to the patient and consent was obtained. The risks, benefits and alternatives to therapy were discussed in detail. Specifically, the risks of ectropion or eyelid deformity, infection, scarring, bleeding, prolonged wound healing, incomplete removal, allergy to anesthesia, nerve injury and recurrence were addressed. Prior to the procedure, the treatment site was clearly identified and confirmed by the patient. All components of Universal Protocol/PAUSE Rule completed.
Cheek Interpolation Flap Text: A decision was made to reconstruct the defect utilizing an interpolation axial flap and a staged reconstruction.  A telfa template was made of the defect.  This telfa template was then used to outline the Cheek Interpolation flap.  The donor area for the pedicle flap was then injected with anesthesia.  The flap was excised through the skin and subcutaneous tissue down to the layer of the underlying musculature.  The interpolation flap was carefully excised within this deep plane to maintain its blood supply.  The edges of the donor site were undermined.   The donor site was closed in a primary fashion.  The pedicle was then rotated into position and sutured.  Once the tube was sutured into place, adequate blood supply was confirmed with blanching and refill.  The pedicle was then wrapped with xeroform gauze and dressed appropriately with a telfa and gauze bandage to ensure continued blood supply and protect the attached pedicle.
O-Z Plasty Text: The defect edges were debeveled with a #15 scalpel blade.  Given the location of the defect, shape of the defect and the proximity to free margins an O-Z plasty (double transposition flap) was deemed most appropriate.  Using a sterile surgical marker, the appropriate transposition flaps were drawn incorporating the defect and placing the expected incisions within the relaxed skin tension lines where possible.    The area thus outlined was incised deep to adipose tissue with a #15 scalpel blade.  The skin margins were undermined to an appropriate distance in all directions utilizing iris scissors.  Hemostasis was achieved with electrocautery.  The flaps were then transposed into place, one clockwise and the other counterclockwise, and anchored with interrupted buried subcutaneous sutures.
No Repair - Repaired With Adjacent Surgical Defect Text (Leave Blank If You Do Not Want): After obtaining clear surgical margins the defect was repaired concurrently with another surgical defect which was in close approximation.
V-Y Plasty Text: The defect edges were debeveled with a #15 scalpel blade.  Given the location of the defect, shape of the defect and the proximity to free margins an V-Y advancement flap was deemed most appropriate.  Using a sterile surgical marker, an appropriate advancement flap was drawn incorporating the defect and placing the expected incisions within the relaxed skin tension lines where possible.    The area thus outlined was incised deep to adipose tissue with a #15 scalpel blade.  The skin margins were undermined to an appropriate distance in all directions utilizing iris scissors.
Dorsal Nasal Flap Text: The defect edges were debeveled with a #15 scalpel blade.  Given the location of the defect and the proximity to free margins a dorsal nasal flap,based upon the glabellar folds, was deemed most appropriate.  Using a sterile surgical marker, an appropriate dorsal nasal flap was drawn around the defect.    The area thus outlined was incised deep to adipose tissue with a #15 scalpel blade.  The skin margins were undermined to an appropriate distance in all directions utilizing iris scissors.
Secondary Intention Text (Leave Blank If You Do Not Want): The defect will heal with secondary intention.
Partial Purse String (Simple) Text: Given the location of the defect and the characteristics of the surrounding skin a simple purse string closure was deemed most appropriate.  Undermining was performed circumfirentially around the surgical defect.  A purse string suture was then placed and tightened. Wound tension only allowed a partial closure of the circular defect.
Graft Basting Suture (Optional): 5-0 Fast Absorbing Gut
Surgeon/Pathologist Verbiage (Will Incorporate Name Of Surgeon From Intro If Not Blank): operated in two distinct and integrated capacities as the surgeon and pathologist.
Unique Flap 4 Name: Banner Flap
Location Indication Override (Is Already Calculated Based On Selected Body Location): Area H
Mucosal Advancement Flap Text: Given the location of the defect, shape of the defect and the proximity to free margins a mucosal advancement flap was deemed most appropriate. Incisions were made with a 15 blade scalpel in the appropriate fashion along the cutaneous vermilion border and the mucosal lip. The remaining actinically damaged mucosal tissue was excised.  The mucosal advancement flap was then elevated to the gingival sulcus with care taken to preserve the neurovascular structures and advanced into the primary defect. Care was taken to ensure that precise realignment of the vermilion border was achieved.
Bilobed Transposition Flap Text: The defect edges were debeveled with a #15 scalpel blade.  Given the location of the defect and the proximity to free margins a bilobed transposition flap was deemed most appropriate.  Using a sterile surgical marker, an appropriate bilobe flap drawn around the defect.    The area thus outlined was incised deep to adipose tissue with a #15 scalpel blade.  The skin margins were undermined to an appropriate distance in all directions utilizing iris scissors.
Consent 2/Introductory Paragraph: Mohs surgery was explained to the patient and consent was obtained. The risks, benefits and alternatives to therapy were discussed in detail. Specifically, the risks of infection, scarring, bleeding, prolonged wound healing, incomplete removal, allergy to anesthesia, nerve injury and recurrence were addressed. Prior to the procedure, the treatment site was clearly identified and confirmed by the patient. All components of Universal Protocol/PAUSE Rule completed.
Advancement Flap (Single) Text: The defect edges were debeveled with a #15 scalpel blade.  Given the location of the defect and the proximity to free margins a single advancement flap was deemed most appropriate.  Using a sterile surgical marker, an appropriate advancement flap was drawn incorporating the defect and placing the expected incisions within the relaxed skin tension lines where possible.    The area thus outlined was incised deep to adipose tissue with a #15 scalpel blade.  The skin margins were undermined to an appropriate distance in all directions utilizing iris scissors.
Island Pedicle Flap-Requiring Vessel Identification Text: The defect edges were debeveled with a #15 scalpel blade.  Given the location of the defect, shape of the defect and the proximity to free margins an island pedicle advancement flap was deemed most appropriate.  Using a sterile surgical marker, an appropriate advancement flap was drawn, based on the axial vessel mentioned above, incorporating the defect, outlining the appropriate donor tissue and placing the expected incisions within the relaxed skin tension lines where possible.    The area thus outlined was incised deep to adipose tissue with a #15 scalpel blade.  The skin margins were undermined to an appropriate distance in all directions around the primary defect and laterally outward around the island pedicle utilizing iris scissors.  There was minimal undermining beneath the pedicle flap.
Deep Sutures: 5-0 Vicryl
Cheiloplasty (Less Than 50%) Text: A decision was made to reconstruct the defect with a  cheiloplasty.  The defect was undermined extensively.  Additional obicularis oris muscle was excised with a 15 blade scalpel.  The defect was converted into a full thickness wedge, of less than 50% of the vertical height of the lip, to facilite a better cosmetic result.  Small vessels were then tied off with 5-0 monocyrl. The obicularis oris, superficial fascia, adipose and dermis were then reapproximated.  After the deeper layers were approximated the epidermis was reapproximated with particular care given to realign the vermilion border.
Epidermal Closure: running cuticular
Muscle Hinge Flap Text: The defect edges were debeveled with a #15 scalpel blade.  Given the size, depth and location of the defect and the proximity to free margins a muscle hinge flap was deemed most appropriate.  Using a sterile surgical marker, an appropriate hinge flap was drawn incorporating the defect. The area thus outlined was incised with a #15 scalpel blade.  The skin margins were undermined to an appropriate distance in all directions utilizing iris scissors.
Graft Donor Site Bandage (Optional-Leave Blank If You Don't Want In Note): Aquaphor and telefa placed on wound. Pressure dressing applied to donor site
Mauc Instructions: By selecting yes to the question below the MAUC number will be added into the note.  This will be calculated automatically based on the diagnosis chosen, the size entered, the body zone selected (H,M,L) and the specific indications you chose. You will also have the option to override the Mohs AUC if you disagree with the automatically calculated number and this option is found in the Case Summary tab.
No Residual Tumor Seen Histology Text: There were no malignant cells seen in the sections examined.
Helical Rim Advancement Flap Text: The defect edges were debeveled with a #15 blade scalpel.  Given the location of the defect and the proximity to free margins (helical rim) a double helical rim advancement flap was deemed most appropriate.  Using a sterile surgical marker, the appropriate advancement flaps were drawn incorporating the defect and placing the expected incisions between the helical rim and antihelix where possible.  The area thus outlined was incised through and through with a #15 scalpel blade.  With a skin hook and iris scissors, the flaps were gently and sharply undermined and freed up.
O-L Flap Text: The defect edges were debeveled with a #15 scalpel blade.  Given the location of the defect, shape of the defect and the proximity to free margins an O-L flap was deemed most appropriate.  Using a sterile surgical marker, an appropriate advancement flap was drawn incorporating the defect and placing the expected incisions within the relaxed skin tension lines where possible.    The area thus outlined was incised deep to adipose tissue with a #15 scalpel blade.  The skin margins were undermined to an appropriate distance in all directions utilizing iris scissors.
Tarsorrhaphy Text: A tarsorrhaphy was performed using Frost sutures.
Consent (Ear)/Introductory Paragraph: The rationale for Mohs was explained to the patient and consent was obtained. The risks, benefits and alternatives to therapy were discussed in detail. Specifically, the risks of ear deformity, infection, scarring, bleeding, prolonged wound healing, incomplete removal, allergy to anesthesia, nerve injury and recurrence were addressed. Prior to the procedure, the treatment site was clearly identified and confirmed by the patient. All components of Universal Protocol/PAUSE Rule completed.
Cheiloplasty (Complex) Text: A decision was made to reconstruct the defect with a  cheiloplasty.  The defect was undermined extensively.  Additional obicularis oris muscle was excised with a 15 blade scalpel.  The defect was converted into a full thickness wedge to facilite a better cosmetic result.  Small vessels were then tied off with 5-0 monocyrl. The obicularis oris, superficial fascia, adipose and dermis were then reapproximated.  After the deeper layers were approximated the epidermis was reapproximated with particular care given to realign the vermilion border.
Epidermal Autograft Text: The defect edges were debeveled with a #15 scalpel blade.  Given the location of the defect, shape of the defect and the proximity to free margins an epidermal autograft was deemed most appropriate.  Using a sterile surgical marker, the primary defect shape was transferred to the donor site. The epidermal graft was then harvested.  The skin graft was then placed in the primary defect and oriented appropriately.
Estimated Blood Loss (Cc): less than 5 cc
Post-Care Instructions: I reviewed with the patient in detail post-care instructions. Patient is not to engage in any heavy lifting, exercise, or swimming for the next 14 days. Should the patient develop any fevers, chills, bleeding, severe pain patient will contact the office immediately.
Hatchet Flap Text: The defect edges were debeveled with a #15 scalpel blade.  Given the location of the defect, shape of the defect and the proximity to free margins a hatchet flap based from the glabella was deemed most appropriate.  Using a sterile surgical marker, an appropriate glabellar hatchet flap was drawn incorporating the defect and placing the expected incisions within the relaxed skin tension lines where possible.    The area thus outlined was incised deep to adipose tissue with a #15 scalpel blade.  The skin margins were undermined to an appropriate distance in all directions utilizing iris scissors.
Unique Flap 4 Text: The defect edges were debeveled with a #15 scalpel blade.  Given the location of the defect and the proximity to free margins a Banner transposition flap was deemed most appropriate.  Using a sterile surgical marker, an appropriate Banner transposition flap was drawn incorporating the defect.    The area thus outlined was incised deep to adipose tissue with a #15 scalpel blade.  The skin margins were undermined to an appropriate distance in all directions utilizing iris scissors.
Trilobed Flap Text: The defect edges were debeveled with a #15 scalpel blade.  Given the location of the defect and the proximity to free margins a trilobed flap was deemed most appropriate.  Using a sterile surgical marker, an appropriate trilobed flap drawn around the defect.    The area thus outlined was incised deep to adipose tissue with a #15 scalpel blade.  The skin margins were undermined to an appropriate distance in all directions utilizing iris scissors.
H Plasty Text: Given the location of the defect, shape of the defect and the proximity to free margins a H-plasty was deemed most appropriate for repair.  Using a sterile surgical marker, the appropriate advancement arms of the H-plasty were drawn incorporating the defect and placing the expected incisions within the relaxed skin tension lines where possible. The area thus outlined was incised deep to adipose tissue with a #15 scalpel blade. The skin margins were undermined to an appropriate distance in all directions utilizing iris scissors.  The opposing advancement arms were then advanced into place in opposite direction and anchored with interrupted buried subcutaneous sutures.
Bilateral Helical Rim Advancement Flap Text: The defect edges were debeveled with a #15 blade scalpel.  Given the location of the defect and the proximity to free margins (helical rim) a bilateral helical rim advancement flap was deemed most appropriate.  Using a sterile surgical marker, the appropriate advancement flaps were drawn incorporating the defect and placing the expected incisions between the helical rim and antihelix where possible.  The area thus outlined was incised through and through with a #15 scalpel blade.  With a skin hook and iris scissors, the flaps were gently and sharply undermined and freed up.
Complex/Intermediate Repair Variations: Crescentic
Graft Cartilage Fenestration Text: The cartilage was fenestrated with a 2mm punch biopsy to help facilitate graft survival and healing.
Advancement Flap (Double) Text: The defect edges were debeveled with a #15 scalpel blade.  Given the location of the defect and the proximity to free margins a double advancement flap was deemed most appropriate.  Using a sterile surgical marker, the appropriate advancement flaps were drawn incorporating the defect and placing the expected incisions within the relaxed skin tension lines where possible.    The area thus outlined was incised deep to adipose tissue with a #15 scalpel blade.  The skin margins were undermined to an appropriate distance in all directions utilizing iris scissors.
O-T Advancement Flap Text: The defect edges were debeveled with a #15 scalpel blade.  Given the location of the defect, shape of the defect and the proximity to free margins an O-T advancement flap was deemed most appropriate.  Using a sterile surgical marker, an appropriate advancement flap was drawn incorporating the defect and placing the expected incisions within the relaxed skin tension lines where possible.    The area thus outlined was incised deep to adipose tissue with a #15 scalpel blade.  The skin margins were undermined to an appropriate distance in all directions utilizing iris scissors.
Tissue Cultured Epidermal Autograft Text: The defect edges were debeveled with a #15 scalpel blade.  Given the location of the defect, shape of the defect and the proximity to free margins a tissue cultured epidermal autograft was deemed most appropriate.  The graft was then trimmed to fit the size of the defect.  The graft was then placed in the primary defect and oriented appropriately.
Modified Advancement Flap Text: The defect edges were debeveled with a #15 scalpel blade.  Given the location of the defect, shape of the defect and the proximity to free margins a modified advancement flap was deemed most appropriate.  Using a sterile surgical marker, an appropriate advancement flap was drawn incorporating the defect and placing the expected incisions within the relaxed skin tension lines where possible.    The area thus outlined was incised deep to adipose tissue with a #15 scalpel blade.  The skin margins were undermined to an appropriate distance in all directions utilizing iris scissors.
Ftsg Text: The defect edges were debeveled with a #15 scalpel blade.  Given the location of the defect, shape of the defect and the proximity to free margins a full thickness skin graft was deemed most appropriate.  Using a sterile surgical marker, the primary defect shape was transferred to the donor site. The area thus outlined was incised deep to adipose tissue with a #15 scalpel blade.  The harvested graft was then trimmed of adipose tissue until only dermis and epidermis was left.  The skin margins of the secondary defect were undermined to an appropriate distance in all directions utilizing iris scissors.  The secondary defect was closed with interrupted buried subcutaneous sutures.  The skin edges were then re-apposed with running  sutures.  The skin graft was then placed in the primary defect and oriented appropriately.
Mastoid Interpolation Flap Text: A decision was made to reconstruct the defect utilizing an interpolation axial flap and a staged reconstruction.  A telfa template was made of the defect.  This telfa template was then used to outline the mastoid interpolation flap.  The donor area for the pedicle flap was then injected with anesthesia.  The flap was excised through the skin and subcutaneous tissue down to the layer of the underlying musculature.  The pedicle flap was carefully excised within this deep plane to maintain its blood supply.  The edges of the donor site were undermined.   The donor site was closed in a primary fashion.  The pedicle was then rotated into position and sutured.  Once the tube was sutured into place, adequate blood supply was confirmed with blanching and refill.  The pedicle was then wrapped with xeroform gauze and dressed appropriately with a telfa and gauze bandage to ensure continued blood supply and protect the attached pedicle.
Same Histology In Subsequent Stages Text: The pattern and morphology of the tumor is as described in the first stage.
Subsequent Stages Histo Method Verbiage: Using a similar technique to that described above, a thin layer of tissue was removed from all areas where tumor was visible on the previous stage.  The tissue was again oriented, mapped, dyed, and processed as above.

## 2018-10-17 ENCOUNTER — TELEPHONE (OUTPATIENT)
Dept: INTERNAL MEDICINE | Facility: IMAGING CENTER | Age: 63
End: 2018-10-17

## 2018-10-17 DIAGNOSIS — E78.00 ELEVATED CHOLESTEROL: ICD-10-CM

## 2018-10-17 DIAGNOSIS — R53.83 FATIGUE, UNSPECIFIED TYPE: ICD-10-CM

## 2018-10-17 DIAGNOSIS — I48.0 PAROXYSMAL ATRIAL FIBRILLATION (HCC): ICD-10-CM

## 2018-10-17 NOTE — TELEPHONE ENCOUNTER
----- Message from Dianne Sandoval R.N. sent at 10/16/2018 11:28 AM PDT -----  Jana would like some labs drawn.  She is having weight gain but is not eating very much and she is very fatigued.  She is going to get them drawn at [facility] so anything you want add it and then she will make appointment.    Thanks.

## 2018-11-03 DIAGNOSIS — R60.9 EDEMA, UNSPECIFIED TYPE: ICD-10-CM

## 2018-11-05 ENCOUNTER — HOSPITAL ENCOUNTER (OUTPATIENT)
Dept: LAB | Facility: MEDICAL CENTER | Age: 63
End: 2018-11-05
Attending: FAMILY MEDICINE
Payer: MEDICARE

## 2018-11-05 DIAGNOSIS — E78.00 ELEVATED CHOLESTEROL: ICD-10-CM

## 2018-11-05 DIAGNOSIS — K50.819 CROHN'S DISEASE OF SMALL AND LARGE INTESTINES WITH COMPLICATION (HCC): ICD-10-CM

## 2018-11-05 DIAGNOSIS — R60.9 EDEMA, UNSPECIFIED TYPE: ICD-10-CM

## 2018-11-05 DIAGNOSIS — I48.0 PAROXYSMAL ATRIAL FIBRILLATION (HCC): ICD-10-CM

## 2018-11-05 DIAGNOSIS — R53.83 FATIGUE, UNSPECIFIED TYPE: ICD-10-CM

## 2018-11-05 LAB
BASOPHILS # BLD AUTO: 1.1 % (ref 0–1.8)
BASOPHILS # BLD: 0.07 K/UL (ref 0–0.12)
EOSINOPHIL # BLD AUTO: 0.36 K/UL (ref 0–0.51)
EOSINOPHIL NFR BLD: 5.6 % (ref 0–6.9)
ERYTHROCYTE [DISTWIDTH] IN BLOOD BY AUTOMATED COUNT: 40.8 FL (ref 35.9–50)
HCT VFR BLD AUTO: 44.8 % (ref 37–47)
HGB BLD-MCNC: 14.3 G/DL (ref 12–16)
IMM GRANULOCYTES # BLD AUTO: 0.01 K/UL (ref 0–0.11)
IMM GRANULOCYTES NFR BLD AUTO: 0.2 % (ref 0–0.9)
LYMPHOCYTES # BLD AUTO: 2.12 K/UL (ref 1–4.8)
LYMPHOCYTES NFR BLD: 33.2 % (ref 22–41)
MCH RBC QN AUTO: 29.7 PG (ref 27–33)
MCHC RBC AUTO-ENTMCNC: 31.9 G/DL (ref 33.6–35)
MCV RBC AUTO: 92.9 FL (ref 81.4–97.8)
MONOCYTES # BLD AUTO: 0.51 K/UL (ref 0–0.85)
MONOCYTES NFR BLD AUTO: 8 % (ref 0–13.4)
NEUTROPHILS # BLD AUTO: 3.31 K/UL (ref 2–7.15)
NEUTROPHILS NFR BLD: 51.9 % (ref 44–72)
NRBC # BLD AUTO: 0 K/UL
NRBC BLD-RTO: 0 /100 WBC
PLATELET # BLD AUTO: 202 K/UL (ref 164–446)
PMV BLD AUTO: 11.1 FL (ref 9–12.9)
RBC # BLD AUTO: 4.82 M/UL (ref 4.2–5.4)
TSH SERPL DL<=0.005 MIU/L-ACNC: 0.75 UIU/ML (ref 0.38–5.33)
VIT B12 SERPL-MCNC: 706 PG/ML (ref 211–911)
WBC # BLD AUTO: 6.4 K/UL (ref 4.8–10.8)

## 2018-11-05 PROCEDURE — 84443 ASSAY THYROID STIM HORMONE: CPT

## 2018-11-05 PROCEDURE — 36415 COLL VENOUS BLD VENIPUNCTURE: CPT

## 2018-11-05 PROCEDURE — 85025 COMPLETE CBC W/AUTO DIFF WBC: CPT

## 2018-11-05 PROCEDURE — 85652 RBC SED RATE AUTOMATED: CPT

## 2018-11-05 PROCEDURE — 80061 LIPID PANEL: CPT

## 2018-11-05 PROCEDURE — 82607 VITAMIN B-12: CPT

## 2018-11-05 PROCEDURE — 80053 COMPREHEN METABOLIC PANEL: CPT

## 2018-11-05 RX ORDER — SPIRONOLACTONE 25 MG/1
TABLET ORAL
Qty: 360 TAB | Refills: 3 | Status: SHIPPED | OUTPATIENT
Start: 2018-11-05 | End: 2018-12-28 | Stop reason: CLARIF

## 2018-11-05 RX ORDER — SPIRONOLACTONE 25 MG/1
TABLET ORAL
Qty: 120 TAB | Refills: 5 | Status: SHIPPED | OUTPATIENT
Start: 2018-11-05 | End: 2018-11-05 | Stop reason: SDUPTHER

## 2018-11-05 NOTE — TELEPHONE ENCOUNTER
----- Message from Dianne Sandoval R.N. sent at 11/5/2018  8:21 AM PST -----  Jana would like a Sed Rate added to her labs today because she has not been feeling well lately.

## 2018-11-06 LAB
ALBUMIN SERPL BCP-MCNC: 4.4 G/DL (ref 3.2–4.9)
ALBUMIN/GLOB SERPL: 1.4 G/DL
ALP SERPL-CCNC: 111 U/L (ref 30–99)
ALT SERPL-CCNC: 12 U/L (ref 2–50)
ANION GAP SERPL CALC-SCNC: 8 MMOL/L (ref 0–11.9)
AST SERPL-CCNC: 19 U/L (ref 12–45)
BILIRUB SERPL-MCNC: 0.4 MG/DL (ref 0.1–1.5)
BUN SERPL-MCNC: 11 MG/DL (ref 8–22)
CALCIUM SERPL-MCNC: 9.9 MG/DL (ref 8.5–10.5)
CHLORIDE SERPL-SCNC: 103 MMOL/L (ref 96–112)
CHOLEST SERPL-MCNC: 205 MG/DL (ref 100–199)
CO2 SERPL-SCNC: 31 MMOL/L (ref 20–33)
CREAT SERPL-MCNC: 0.96 MG/DL (ref 0.5–1.4)
FASTING STATUS PATIENT QL REPORTED: NORMAL
GLOBULIN SER CALC-MCNC: 3.1 G/DL (ref 1.9–3.5)
GLUCOSE SERPL-MCNC: 88 MG/DL (ref 65–99)
HDLC SERPL-MCNC: 60 MG/DL
LDLC SERPL CALC-MCNC: 112 MG/DL
POTASSIUM SERPL-SCNC: 3.5 MMOL/L (ref 3.6–5.5)
PROT SERPL-MCNC: 7.5 G/DL (ref 6–8.2)
SODIUM SERPL-SCNC: 142 MMOL/L (ref 135–145)
TRIGL SERPL-MCNC: 165 MG/DL (ref 0–149)

## 2018-11-12 ENCOUNTER — NON-PROVIDER VISIT (OUTPATIENT)
Dept: INTERNAL MEDICINE | Facility: IMAGING CENTER | Age: 63
End: 2018-11-12
Payer: MEDICARE

## 2018-11-12 ENCOUNTER — APPOINTMENT (RX ONLY)
Dept: URBAN - METROPOLITAN AREA CLINIC 36 | Facility: CLINIC | Age: 63
Setting detail: DERMATOLOGY
End: 2018-11-12

## 2018-11-12 DIAGNOSIS — E53.8 VITAMIN B12 DEFICIENCY: ICD-10-CM

## 2018-11-12 DIAGNOSIS — Z23 NEED FOR VACCINATION: ICD-10-CM

## 2018-11-12 DIAGNOSIS — L90.5 SCAR CONDITIONS AND FIBROSIS OF SKIN: ICD-10-CM

## 2018-11-12 DIAGNOSIS — Z86.39 HISTORY OF NON ANEMIC VITAMIN B12 DEFICIENCY: ICD-10-CM

## 2018-11-12 PROCEDURE — ? FRAXEL

## 2018-11-12 PROCEDURE — G0009 ADMIN PNEUMOCOCCAL VACCINE: HCPCS | Performed by: FAMILY MEDICINE

## 2018-11-12 PROCEDURE — G0008 ADMIN INFLUENZA VIRUS VAC: HCPCS | Performed by: FAMILY MEDICINE

## 2018-11-12 PROCEDURE — 90662 IIV NO PRSV INCREASED AG IM: CPT | Performed by: FAMILY MEDICINE

## 2018-11-12 PROCEDURE — 99024 POSTOP FOLLOW-UP VISIT: CPT

## 2018-11-12 PROCEDURE — 90670 PCV13 VACCINE IM: CPT | Performed by: FAMILY MEDICINE

## 2018-11-12 RX ORDER — CYANOCOBALAMIN 1000 UG/ML
1000 INJECTION, SOLUTION INTRAMUSCULAR; SUBCUTANEOUS ONCE
Status: COMPLETED | OUTPATIENT
Start: 2018-11-12 | End: 2018-11-12

## 2018-11-12 RX ADMIN — CYANOCOBALAMIN 1000 MCG: 1000 INJECTION, SOLUTION INTRAMUSCULAR; SUBCUTANEOUS at 09:41

## 2018-11-12 NOTE — PROCEDURE: FRAXEL
External Cooling Fan Speed: 5
Energy(Mj/Cm2): 40
Energy(Mj/Cm2): 70
Number Of Passes: 4
Treatment Level: 1
Topical Anesthesia Type: 7% Lidocaine 7% Tetracaine
Indication: surgical scars
Wavelength: 1550nm
Anesthesia Type: 1% lidocaine with epinephrine
Post-Care Instructions: I reviewed with the patient in detail post-care instructions. Patient should avoid sun until area fully healed.
Add Post-Care Below To The Note: No
Detail Level: Zone
Consent: Written consent obtained, risks reviewed including but not limited to pain and incomplete improvement.

## 2018-11-19 DIAGNOSIS — B37.0 THRUSH: ICD-10-CM

## 2018-11-23 ENCOUNTER — HOSPITAL ENCOUNTER (EMERGENCY)
Facility: MEDICAL CENTER | Age: 63
End: 2018-11-23
Attending: EMERGENCY MEDICINE
Payer: MEDICARE

## 2018-11-23 VITALS
HEART RATE: 83 BPM | DIASTOLIC BLOOD PRESSURE: 67 MMHG | OXYGEN SATURATION: 91 % | HEIGHT: 72 IN | SYSTOLIC BLOOD PRESSURE: 152 MMHG | RESPIRATION RATE: 20 BRPM | BODY MASS INDEX: 22.4 KG/M2 | TEMPERATURE: 99.8 F | WEIGHT: 165.34 LBS

## 2018-11-23 DIAGNOSIS — R10.84 GENERALIZED ABDOMINAL PAIN: ICD-10-CM

## 2018-11-23 DIAGNOSIS — R11.2 NON-INTRACTABLE VOMITING WITH NAUSEA, UNSPECIFIED VOMITING TYPE: ICD-10-CM

## 2018-11-23 DIAGNOSIS — E86.0 DEHYDRATION: ICD-10-CM

## 2018-11-23 LAB
ALBUMIN SERPL BCP-MCNC: 4.5 G/DL (ref 3.2–4.9)
ALBUMIN/GLOB SERPL: 1.2 G/DL
ALP SERPL-CCNC: 97 U/L (ref 30–99)
ALT SERPL-CCNC: 21 U/L (ref 2–50)
ANION GAP SERPL CALC-SCNC: 17 MMOL/L (ref 0–11.9)
AST SERPL-CCNC: 31 U/L (ref 12–45)
BASOPHILS # BLD AUTO: 0.5 % (ref 0–1.8)
BASOPHILS # BLD: 0.05 K/UL (ref 0–0.12)
BILIRUB SERPL-MCNC: 2 MG/DL (ref 0.1–1.5)
BUN SERPL-MCNC: 27 MG/DL (ref 8–22)
CALCIUM SERPL-MCNC: 9.7 MG/DL (ref 8.4–10.2)
CHLORIDE SERPL-SCNC: 101 MMOL/L (ref 96–112)
CO2 SERPL-SCNC: 20 MMOL/L (ref 20–33)
CREAT SERPL-MCNC: 1.53 MG/DL (ref 0.5–1.4)
EOSINOPHIL # BLD AUTO: 0.02 K/UL (ref 0–0.51)
EOSINOPHIL NFR BLD: 0.2 % (ref 0–6.9)
ERYTHROCYTE [DISTWIDTH] IN BLOOD BY AUTOMATED COUNT: 40.2 FL (ref 35.9–50)
GLOBULIN SER CALC-MCNC: 3.9 G/DL (ref 1.9–3.5)
GLUCOSE SERPL-MCNC: 115 MG/DL (ref 65–99)
HCT VFR BLD AUTO: 45.4 % (ref 37–47)
HGB BLD-MCNC: 15.2 G/DL (ref 12–16)
IMM GRANULOCYTES # BLD AUTO: 0.03 K/UL (ref 0–0.11)
IMM GRANULOCYTES NFR BLD AUTO: 0.3 % (ref 0–0.9)
LIPASE SERPL-CCNC: 17 U/L (ref 7–58)
LYMPHOCYTES # BLD AUTO: 1.1 K/UL (ref 1–4.8)
LYMPHOCYTES NFR BLD: 11.3 % (ref 22–41)
MCH RBC QN AUTO: 29.6 PG (ref 27–33)
MCHC RBC AUTO-ENTMCNC: 33.5 G/DL (ref 33.6–35)
MCV RBC AUTO: 88.5 FL (ref 81.4–97.8)
MONOCYTES # BLD AUTO: 0.9 K/UL (ref 0–0.85)
MONOCYTES NFR BLD AUTO: 9.3 % (ref 0–13.4)
NEUTROPHILS # BLD AUTO: 7.6 K/UL (ref 2–7.15)
NEUTROPHILS NFR BLD: 78.4 % (ref 44–72)
NRBC # BLD AUTO: 0 K/UL
NRBC BLD-RTO: 0 /100 WBC
PLATELET # BLD AUTO: 268 K/UL (ref 164–446)
PMV BLD AUTO: 10.4 FL (ref 9–12.9)
POTASSIUM SERPL-SCNC: 3.3 MMOL/L (ref 3.6–5.5)
PROT SERPL-MCNC: 8.4 G/DL (ref 6–8.2)
RBC # BLD AUTO: 5.13 M/UL (ref 4.2–5.4)
SODIUM SERPL-SCNC: 138 MMOL/L (ref 135–145)
WBC # BLD AUTO: 9.7 K/UL (ref 4.8–10.8)

## 2018-11-23 PROCEDURE — 96376 TX/PRO/DX INJ SAME DRUG ADON: CPT

## 2018-11-23 PROCEDURE — 700111 HCHG RX REV CODE 636 W/ 250 OVERRIDE (IP): Performed by: EMERGENCY MEDICINE

## 2018-11-23 PROCEDURE — 96361 HYDRATE IV INFUSION ADD-ON: CPT

## 2018-11-23 PROCEDURE — A9270 NON-COVERED ITEM OR SERVICE: HCPCS | Performed by: EMERGENCY MEDICINE

## 2018-11-23 PROCEDURE — 36415 COLL VENOUS BLD VENIPUNCTURE: CPT

## 2018-11-23 PROCEDURE — 700101 HCHG RX REV CODE 250: Performed by: EMERGENCY MEDICINE

## 2018-11-23 PROCEDURE — 83690 ASSAY OF LIPASE: CPT

## 2018-11-23 PROCEDURE — 80053 COMPREHEN METABOLIC PANEL: CPT

## 2018-11-23 PROCEDURE — 96374 THER/PROPH/DIAG INJ IV PUSH: CPT

## 2018-11-23 PROCEDURE — 700102 HCHG RX REV CODE 250 W/ 637 OVERRIDE(OP): Performed by: EMERGENCY MEDICINE

## 2018-11-23 PROCEDURE — 96375 TX/PRO/DX INJ NEW DRUG ADDON: CPT

## 2018-11-23 PROCEDURE — 99285 EMERGENCY DEPT VISIT HI MDM: CPT

## 2018-11-23 PROCEDURE — 85025 COMPLETE CBC W/AUTO DIFF WBC: CPT

## 2018-11-23 PROCEDURE — 700105 HCHG RX REV CODE 258: Performed by: EMERGENCY MEDICINE

## 2018-11-23 RX ORDER — HYDROMORPHONE HYDROCHLORIDE 1 MG/ML
1 INJECTION, SOLUTION INTRAMUSCULAR; INTRAVENOUS; SUBCUTANEOUS ONCE
Status: COMPLETED | OUTPATIENT
Start: 2018-11-23 | End: 2018-11-23

## 2018-11-23 RX ORDER — METOCLOPRAMIDE HYDROCHLORIDE 5 MG/ML
10 INJECTION INTRAMUSCULAR; INTRAVENOUS ONCE
Status: COMPLETED | OUTPATIENT
Start: 2018-11-23 | End: 2018-11-23

## 2018-11-23 RX ORDER — SODIUM CHLORIDE 9 MG/ML
1000 INJECTION, SOLUTION INTRAVENOUS ONCE
Status: COMPLETED | OUTPATIENT
Start: 2018-11-23 | End: 2018-11-23

## 2018-11-23 RX ORDER — ONDANSETRON 2 MG/ML
4 INJECTION INTRAMUSCULAR; INTRAVENOUS ONCE
Status: COMPLETED | OUTPATIENT
Start: 2018-11-23 | End: 2018-11-23

## 2018-11-23 RX ORDER — METOPROLOL TARTRATE 1 MG/ML
5 INJECTION, SOLUTION INTRAVENOUS ONCE
Status: COMPLETED | OUTPATIENT
Start: 2018-11-23 | End: 2018-11-23

## 2018-11-23 RX ADMIN — METOCLOPRAMIDE 10 MG: 5 INJECTION, SOLUTION INTRAMUSCULAR; INTRAVENOUS at 17:26

## 2018-11-23 RX ADMIN — SODIUM CHLORIDE 1000 ML: 9 INJECTION, SOLUTION INTRAVENOUS at 18:29

## 2018-11-23 RX ADMIN — SODIUM CHLORIDE 1000 ML: 9 INJECTION, SOLUTION INTRAVENOUS at 17:26

## 2018-11-23 RX ADMIN — ONDANSETRON 4 MG: 2 INJECTION INTRAMUSCULAR; INTRAVENOUS at 18:29

## 2018-11-23 RX ADMIN — HYDROMORPHONE HYDROCHLORIDE 1 MG: 1 INJECTION, SOLUTION INTRAMUSCULAR; INTRAVENOUS; SUBCUTANEOUS at 18:29

## 2018-11-23 RX ADMIN — HYDROMORPHONE HYDROCHLORIDE 1 MG: 1 INJECTION, SOLUTION INTRAMUSCULAR; INTRAVENOUS; SUBCUTANEOUS at 17:26

## 2018-11-23 RX ADMIN — LIDOCAINE HYDROCHLORIDE 30 ML: 20 SOLUTION OROPHARYNGEAL at 19:19

## 2018-11-23 RX ADMIN — METOPROLOL TARTRATE 5 MG: 1 INJECTION, SOLUTION INTRAVENOUS at 20:27

## 2018-11-23 ASSESSMENT — PAIN SCALES - GENERAL
PAINLEVEL_OUTOF10: 7
PAINLEVEL_OUTOF10: 8

## 2018-11-24 NOTE — ED PROVIDER NOTES
ED Provider Note    CHIEF COMPLAINT  Chief Complaint   Patient presents with   • Abdominal Pain   • N/V       HPI  Jana Overton is a 63 y.o. female who presents with exacerbation of chronic abdominal pain.  Patient takes oral narcotic pain medication at home, review of chart does not show frequent visits to the ER.  She has been vomiting however unable to keep her on medication down.  In the past 2 days both generalized abdominal pain as well as vomiting have worsened, she now feels lightheaded.  No chest pain, no shortness of breath.  Vomiting and abdominal discomfort have been intermittent over the past 3 months.  She has a history of Crohn's disease as well as multiple abdominal surgeries.  Patient states she has had multiple CAT scans in the past and is reluctant to have a repeat test.  No bloody emesis.  Patient has been taking oral nausea medication at home without relief, Zofran.  Requesting a new type of medication if possible.  No fever.  Onset of symptoms gradual while at rest at home.    REVIEW OF SYSTEMS  Constitutional: Lightheaded  Respiratory: No shortness of breath  Cardiac: No chest pain or syncope  Gastrointestinal: Vomiting, exacerbation of chronic abdominal pain, history of Crohn's disease  Musculoskeletal: No acute neck or back pain  Neurologic: No headache  Genitourinary: Diminished urine output       All other systems are negative.     PAST MEDICAL HISTORY  Past Medical History:   Diagnosis Date   • Anesthesia     difficult Iv stick   • Anxiety    • Arthritis     all over   • ASTHMA    • Atrial fib/flut    • Backpain    • Breath shortness    • Bronchitis     5 years   • Chronic pain    • Colostomy in place ([facility]) 10/14/2010   • COPD (chronic obstructive pulmonary disease) (Former[facility]) 6/24/2014   • COPD (chronic obstructive pulmonary disease) (Former[facility]) 6/24/2014   • Crohn's disease of colon (Former[facility])    • Dyspnea    • History of cardiac murmur    • Hypokalemia 6/24/2014   • Indigestion    • Infectious  disease     MRSA, VancoRSA   • Multiple falls 6/24/2014   • Narcotic dependence (HCC) 9/23/2009   • Obstruction    • Other specified disorder of intestines     crohns disease   • Pain 7/11/13    all over   • Pneumonia     child and mid 30's   • Postherpetic neuralgia 6/24/2014   • Rosacea 6/24/2014   • Sleep apnea        FAMILY HISTORY  Family History   Problem Relation Age of Onset   • Lung Disease Mother         copd   • Diabetes Father    • Heart Disease Father    • Diabetes Sister    • Cancer Maternal Aunt         breast       SOCIAL HISTORY  Social History     Social History   • Marital status:      Spouse name: N/A   • Number of children: N/A   • Years of education: N/A     Social History Main Topics   • Smoking status: Never Smoker   • Smokeless tobacco: Never Used      Comment: second hand smoke   • Alcohol use Yes      Comment: rare   • Drug use: Yes      Comment: medical marijuana   • Sexual activity: Not on file      Comment:      Other Topics Concern   • Not on file     Social History Narrative   • No narrative on file       SURGICAL HISTORY  Past Surgical History:   Procedure Laterality Date   • ILEOSTOMY  5/14/2014    Performed by Kevin Cruz M.D. at SURGERY [facility]   • MAMMOPLASTY REDUCTION  7/17/2013    Performed by Janey Burt M.D. at SURGERY [facility]   • HARDWARE REMOVAL NEURO  7/16/2012    Performed by KEELEY KIM at SURGERY [facility]   • GASTROSCOPY  10/4/2011    Performed by TYSON MARTINEZ at SURGERY SAME DAY [facility] ORS   • COLONOSCOPY  10/4/2011    Performed by TYSON MARTINEZ at SURGERY SAME DAY [facility] ORS   • DILATION AND CURETTAGE  9/24/2010    Performed by GAURAV ALY at SURGERY SAME DAY [facility] ORS   • ILEOSTOMY  11/11/2009    Performed by KEVIN CRUZ at SURGERY [facility]   • GASTROSCOPY WITH BIOPSY  11/22/08    Performed by NEGRITA HUYNH at SURGERY [facility]   • ABDOMINAL EXPLORATION     • CERVICAL DISK  AND FUSION ANTERIOR     • COLON RESECTION     • FOOT SURGERY     • HAND SURGERY     • LUMBAR FUSION ANTERIOR     • LUMPECTOMY     • OTHER ABDOMINAL SURGERY      surgery for chrons disease   • PB REDUCTION OF LARGE BREAST         CURRENT MEDICATIONS  Home Medications    **Home medications have not yet been reviewed for this encounter**         ALLERGIES  Allergies   Allergen Reactions   • Morphine Swelling   • Ativan [Lorazepam]      States give me nightmares.    • Azathioprine Sodium Hives   • Demerol Vomiting   • Elavil [Amitriptyline]      Nightmares     • Fentanyl      Patches caused skin breakdown, no pain relief   • Kdc:Fentanyl    • Lyrica [Pregabalin]    • Methadone    • Methotrexate Hives and Rash   • Methotrexate Derivatives    • Pseudoephedrine Vomiting   • Remicade [Infliximab Injection] Hives   • Roxanol [Morphine Sulfate]      Throat swells   • Sulfa Drugs Hives   • Sulfasalazine    • Tape Rash     Skin peels off; paper tape   • Tizanidine Hydrochloride    • Carafate [Sucralfate] Rash     Generalizes/Mouth Sores   • Celestone [Betamethasone Sodium Phosphate]        PHYSICAL EXAM  VITAL SIGNS: /67   Pulse 83   Temp 37.7 °C (99.8 °F) (Temporal)   Resp 20   Ht 1.829 m (6')   Wt 75 kg (165 lb 5.5 oz)   SpO2 91%   BMI 22.42 kg/m²   Constitutional: Mild distress, nontoxic appearance  ENT: Facial rash, nares clear, mucous membranes dry.  Eyes:  Conjunctiva normal, No discharge.    Lymphatic: No adenopathy.   Cardiovascular: Tachycardic heart rate, Normal rhythm.   Pulmonary: No wheezing, no rales  Gastrointestinal: Diffuse abdominal tenderness, no distention.  Bowel sounds present.  Negative peritoneal signs.  Pain is equal in all 4 quadrants.  Skin: Warm, Dry, No jaundice, had a rash to her face, extremities.  Patient states this is chronic.   Musculoskeletal:  No CVA tenderness.   Neurologic:  Normal motor and sensory function, No focal deficits noted.   Psychiatric: Initially anxious, later  calm with normal affect, Normal mood.    RADIOLOGY/PROCEDURES/Labs  Results for orders placed or performed during the hospital encounter of 11/23/18   CBC WITH DIFFERENTIAL   Result Value Ref Range    WBC 9.7 4.8 - 10.8 K/uL    RBC 5.13 4.20 - 5.40 M/uL    Hemoglobin 15.2 12.0 - 16.0 g/dL    Hematocrit 45.4 37.0 - 47.0 %    MCV 88.5 81.4 - 97.8 fL    MCH 29.6 27.0 - 33.0 pg    MCHC 33.5 (L) 33.6 - 35.0 g/dL    RDW 40.2 35.9 - 50.0 fL    Platelet Count 268 164 - 446 K/uL    MPV 10.4 9.0 - 12.9 fL    Neutrophils-Polys 78.40 (H) 44.00 - 72.00 %    Lymphocytes 11.30 (L) 22.00 - 41.00 %    Monocytes 9.30 0.00 - 13.40 %    Eosinophils 0.20 0.00 - 6.90 %    Basophils 0.50 0.00 - 1.80 %    Immature Granulocytes 0.30 0.00 - 0.90 %    Nucleated RBC 0.00 /100 WBC    Neutrophils (Absolute) 7.60 (H) 2.00 - 7.15 K/uL    Lymphs (Absolute) 1.10 1.00 - 4.80 K/uL    Monos (Absolute) 0.90 (H) 0.00 - 0.85 K/uL    Eos (Absolute) 0.02 0.00 - 0.51 K/uL    Baso (Absolute) 0.05 0.00 - 0.12 K/uL    Immature Granulocytes (abs) 0.03 0.00 - 0.11 K/uL    NRBC (Absolute) 0.00 K/uL   COMP METABOLIC PANEL   Result Value Ref Range    Sodium 138 135 - 145 mmol/L    Potassium 3.3 (L) 3.6 - 5.5 mmol/L    Chloride 101 96 - 112 mmol/L    Co2 20 20 - 33 mmol/L    Anion Gap 17.0 (H) 0.0 - 11.9    Glucose 115 (H) 65 - 99 mg/dL    Bun 27 (H) 8 - 22 mg/dL    Creatinine 1.53 (H) 0.50 - 1.40 mg/dL    Calcium 9.7 8.4 - 10.2 mg/dL    AST(SGOT) 31 12 - 45 U/L    ALT(SGPT) 21 2 - 50 U/L    Alkaline Phosphatase 97 30 - 99 U/L    Total Bilirubin 2.0 (H) 0.1 - 1.5 mg/dL    Albumin 4.5 3.2 - 4.9 g/dL    Total Protein 8.4 (H) 6.0 - 8.2 g/dL    Globulin 3.9 (H) 1.9 - 3.5 g/dL    A-G Ratio 1.2 g/dL   LIPASE   Result Value Ref Range    Lipase 17 7 - 58 U/L   ESTIMATED GFR   Result Value Ref Range    GFR If  41 (A) >60 mL/min/1.73 m 2    GFR If Non  34 (A) >60 mL/min/1.73 m 2         COURSE & MEDICAL DECISION MAKING  Pertinent Labs & Imaging  studies reviewed. (See chart for details)  Laboratory evaluation was discussed with the patient, she shows evidence of dehydration with elevated BUN and creatinine, elevated anion gap.  Patient received 2 L IV normal saline hydration with, also provided medication for pain, Dilaudid, and nausea IV.  Patient is now able to tolerate oral intake, received a GI cocktail at her request.  She received a dose of IV metoprolol at her request, stating she was unable to keep her metoprolol in earlier today secondary to vomiting.  I discussed with the patient admission to the hospital for ongoing hydration and treatment, she stated she would like to avoid hospitalization at this time but has agreed to return if worse or for any concerns.  I recommend she follow-up with her doctor and GI specialist as soon as possible.  Etiology of her exacerbation is unknown, differential including Crohn's flare, gastroenteritis, food poisoning        FINAL IMPRESSION  1. Non-intractable vomiting with nausea, unspecified vomiting type    2. Generalized abdominal pain    3. Dehydration            Electronically signed by: Seven Rebollar, 11/24/2018 1:24 AM

## 2018-11-24 NOTE — ED TRIAGE NOTES
"Pt bib [facility] with c/o abd pain with N/V for the past 3 months. Pt reports that she has a hx of Crohn's disease and feels like this has been an un-resolving \"flare up.\"   "

## 2018-11-24 NOTE — ED NOTES
Pt presents to the ER with c/o exacerbation of her Chrons disease for the last 3 months. Pt states that she has been nonstop n/v for the last 8 days and is unable to keep any of her medications down.  at the bedside.

## 2018-11-24 NOTE — ED NOTES
Dc instructions given to pt and . Instructed to f/u with pcp. Instructed to monitor temperature. Return conditions explained. Pt denies questions. Taken via w/c out to car.  to drive pt home.

## 2018-11-24 NOTE — ED NOTES
Report received from Tamela BARNES. Assumed care of pt. IV fluids infusing. Pt aware of continued need for urine sample.

## 2018-11-26 ENCOUNTER — TELEPHONE (OUTPATIENT)
Dept: INTERNAL MEDICINE | Facility: IMAGING CENTER | Age: 63
End: 2018-11-26

## 2018-11-26 ENCOUNTER — HOSPITAL ENCOUNTER (EMERGENCY)
Facility: MEDICAL CENTER | Age: 63
End: 2018-11-26
Attending: EMERGENCY MEDICINE
Payer: MEDICARE

## 2018-11-26 ENCOUNTER — OFFICE VISIT (OUTPATIENT)
Dept: INTERNAL MEDICINE | Facility: IMAGING CENTER | Age: 63
End: 2018-11-26
Payer: MEDICARE

## 2018-11-26 VITALS
BODY MASS INDEX: 22.35 KG/M2 | RESPIRATION RATE: 19 BRPM | HEIGHT: 72 IN | DIASTOLIC BLOOD PRESSURE: 109 MMHG | WEIGHT: 165 LBS | TEMPERATURE: 99.1 F | SYSTOLIC BLOOD PRESSURE: 140 MMHG | HEART RATE: 94 BPM | OXYGEN SATURATION: 94 %

## 2018-11-26 VITALS
BODY MASS INDEX: 22.89 KG/M2 | SYSTOLIC BLOOD PRESSURE: 124 MMHG | WEIGHT: 169 LBS | DIASTOLIC BLOOD PRESSURE: 70 MMHG | OXYGEN SATURATION: 98 % | RESPIRATION RATE: 14 BRPM | HEIGHT: 72 IN | HEART RATE: 74 BPM | TEMPERATURE: 97.6 F

## 2018-11-26 DIAGNOSIS — R11.2 NON-INTRACTABLE VOMITING WITH NAUSEA, UNSPECIFIED VOMITING TYPE: ICD-10-CM

## 2018-11-26 DIAGNOSIS — K50.10 CROHN'S DISEASE OF COLON WITHOUT COMPLICATION (HCC): ICD-10-CM

## 2018-11-26 DIAGNOSIS — Z93.2 ILEOSTOMY IN PLACE (HCC): ICD-10-CM

## 2018-11-26 DIAGNOSIS — R53.1 WEAKNESS: ICD-10-CM

## 2018-11-26 DIAGNOSIS — N28.9 RENAL INSUFFICIENCY: ICD-10-CM

## 2018-11-26 DIAGNOSIS — Z63.0 MARITAL STRESS: ICD-10-CM

## 2018-11-26 DIAGNOSIS — R11.2 NAUSEA AND VOMITING, INTRACTABILITY OF VOMITING NOT SPECIFIED, UNSPECIFIED VOMITING TYPE: ICD-10-CM

## 2018-11-26 DIAGNOSIS — E86.0 DEHYDRATION: ICD-10-CM

## 2018-11-26 PROCEDURE — 99214 OFFICE O/P EST MOD 30 MIN: CPT | Performed by: FAMILY MEDICINE

## 2018-11-26 PROCEDURE — 99284 EMERGENCY DEPT VISIT MOD MDM: CPT

## 2018-11-26 PROCEDURE — 96372 THER/PROPH/DIAG INJ SC/IM: CPT

## 2018-11-26 PROCEDURE — 700111 HCHG RX REV CODE 636 W/ 250 OVERRIDE (IP): Performed by: EMERGENCY MEDICINE

## 2018-11-26 PROCEDURE — 700105 HCHG RX REV CODE 258: Performed by: EMERGENCY MEDICINE

## 2018-11-26 RX ORDER — SODIUM CHLORIDE 9 MG/ML
1000 INJECTION, SOLUTION INTRAVENOUS ONCE
Status: COMPLETED | OUTPATIENT
Start: 2018-11-26 | End: 2018-11-26

## 2018-11-26 RX ORDER — ONDANSETRON 2 MG/ML
4 INJECTION INTRAMUSCULAR; INTRAVENOUS ONCE
Status: COMPLETED | OUTPATIENT
Start: 2018-11-26 | End: 2018-11-26

## 2018-11-26 RX ORDER — ONDANSETRON 2 MG/ML
4 INJECTION INTRAMUSCULAR; INTRAVENOUS ONCE
Status: DISCONTINUED | OUTPATIENT
Start: 2018-11-26 | End: 2018-11-26 | Stop reason: HOSPADM

## 2018-11-26 RX ADMIN — SODIUM CHLORIDE 1000 ML: 9 INJECTION, SOLUTION INTRAVENOUS at 19:19

## 2018-11-26 RX ADMIN — ONDANSETRON 4 MG: 2 INJECTION INTRAMUSCULAR; INTRAVENOUS at 19:33

## 2018-11-26 SDOH — SOCIAL STABILITY - SOCIAL INSECURITY: PROBLEMS IN RELATIONSHIP WITH SPOUSE OR PARTNER: Z63.0

## 2018-11-26 ASSESSMENT — ENCOUNTER SYMPTOMS
HEADACHES: 0
ABDOMINAL PAIN: 0
DIZZINESS: 0
SHORTNESS OF BREATH: 0
VOMITING: 1
FEVER: 0
NAUSEA: 1
CHILLS: 0
NECK PAIN: 0

## 2018-11-26 ASSESSMENT — PAIN SCALES - GENERAL: PAINLEVEL_OUTOF10: 0

## 2018-11-26 NOTE — ED NOTES
Pt reports she is always in a-fib. Denies CP and SOB. Pt HR in 120's. Refusing EKG, reports this is normal for her when she is dehydrated.

## 2018-11-26 NOTE — TELEPHONE ENCOUNTER
Jana called the answering service.  She states she was checking in after an ER visit.  They had told her to call if she was not drinking fluids or feeling better.  I reviewed her chart and she had presented to the emergency room with dehydration, nausea and vomiting.  Her creatinine was 1.5.  I told her if she was not able to keep her fluids down and was still vomiting I thought she should go back to the emergency room.  She was very upset that there was nothing more he could do on a Sunday.  She was upset also that the office had been closed on Friday.  She was very negative about talking with Dr. Ren on the phone.  Stated that she needs more help at home.  That her  is not around and available as much as she needs him.    I suggested that she go back to the emergency room.  She states she has an appointment on Tuesday 11/27 with GI.  I asked her to call me after her GI appointment.  I suggested that she make an appointment with me so that we could see about home health or home health aides if she needs more help at home.

## 2018-11-26 NOTE — PROGRESS NOTES
Chief Complaint   Patient presents with   • Emesis     Follow-up ER visit       HISTORY OF PRESENT ILLNESS: Patient is a 63 y.o. female established patient who presents today to follow-up an emergency room visit.  She arrived 35 minutes late and was angry.  She states she has had nausea and vomiting for about the past 2 weeks she has had some increased generalized abdominal pain.  She has Crohn's disease and has an ileostomy.  She is followed by GI also.  She did call here last week requesting a Phenergan injection.  We told her that was no longer available.  She does have dissolvable Zofran which she had been using with little improvement.  She states she been having vomiting and abdominal, discomfort that was getting worse for 3 days so she presented to the emergency room on 11/23.  It was felt that she was dehydrated.  Her lab work supported that with hemoglobin of 15.2, hematocrit of 45.4.  Her white count was normal.  Potassium was low at 3.3 her creatinine was elevated at 1.53.  Previously had been 0.96 and her BUN was also elevated.  She was treated with IV fluids, IV Dilaudid and IV Zofran.  She felt better and was given the option of being admitted versus going home with close follow-up.    She opted to go home.    She denies any fevers.  She states her pain is a little better but she has not been able to keep any food or fluids down for the past 2 days.  She states she has had decreased urinary output.  She is here today with her .    Another issue is marital stress.  She is very angry at her  for not being more supportive of her and her illness.  Her thought processes are a bit tangential.  She became angry at him several times during her visit stating that he is never around and that she does not get the care that she needs.  She states she needs more help at home but then states she is very independent.  When I offered home health she states she would not be able to get to the door to let  them in if her  was not at home.  She denies that she is depressed.  She has had counseling in the past.  I referred her to behavioral therapy back in November of last year under similar circumstances.  She was unable to provide her therapist's name today.  She denies any thoughts of suicide.      Patient Active Problem List    Diagnosis Date Noted   • Chronic respiratory failure with hypoxia ([facility]) 03/20/2018   • Anxiety disorder due to multiple medical problems 12/01/2017   • DDD (degenerative disc disease), lumbar 04/12/2017   • History of DVT (deep vein thrombosis) 11/23/2016   • Paroxysmal atrial fibrillation ([facility]) 03/26/2015   • Sleep apnea 10/26/2014   • Postherpetic neuralgia 06/24/2014   • Multiple falls 06/24/2014   • COPD (chronic obstructive pulmonary disease) ([facility]) 06/24/2014   • Rosacea 06/24/2014   • Ileostomy in place ([facility]) 06/26/2013   • GERD (gastroesophageal reflux disease) 12/05/2012   • Status post lumbar surgery 07/16/2012   • Crohn's disease ([facility]) 09/23/2009   • Chronic pain 09/23/2009   • Opioid type dependence, continuous (CMS-HCC) 09/23/2009       Current Outpatient Prescriptions:   •  ondansetron (ZOFRAN ODT) 4 MG TABLET DISPERSIBLE, Take 1 Tab by mouth every 6 hours as needed for Nausea., Disp: 60 Tab, Rfl: 2  •  nystatin (MYCOSTATIN) 367133 UNIT/ML Suspension, SHAKE LIQUID AND TAKE 5 ML BY MOUTH FOUR TIMES DAILY, Disp: 120 mL, Rfl: 0  •  spironolactone (ALDACTONE) 25 MG Tab, TAKE 2 TABLETS BY MOUTH TWICE DAILY, Disp: 360 Tab, Rfl: 3  •  albuterol 108 (90 Base) MCG/ACT Aero Soln inhalation aerosol, Inhale 2 Puffs by mouth every 6 hours as needed for Shortness of Breath., Disp: 1 Inhaler, Rfl: 1  •  torsemide (DEMADEX) 10 MG tablet, Take 1 Tab by mouth 1 time daily as needed. Prn swelling (Patient not taking: Reported on 11/26/2018), Disp: 30 Tab, Rfl: 1  •  fluticasone (FLONASE) 50 MCG/ACT nasal spray, Spray 2 Sprays in nose every day. (Patient not taking: Reported on 11/26/2018),  "Disp: 1 Bottle, Rfl: 2  •  azelastine (ASTELIN) 137 MCG/SPRAY nasal spray, Spray 1 Spray in nose 2 times a day., Disp: 30 mL, Rfl: 1  •  acyclovir (ZOVIRAX) 200 MG/5ML Suspension, Take 10 mL by mouth every 8 hours., Disp: 120 mL, Rfl: 0  •  spironolactone (ALDACTONE) 25 MG Tab, Take 2 Tabs by mouth 2 times a day. (Patient taking differently: Take 50 mg by mouth every day.), Disp: 120 Tab, Rfl: 3  •  acyclovir (ZOVIRAX) 400 MG tablet, Take 1 Tab by mouth 3 times a day., Disp: 30 Tab, Rfl: 1  •  Magnesium 100 MG Tab, Take  by mouth every 48 hours., Disp: , Rfl:   •  MAGNESIUM CITRATE PO, Take  by mouth every 48 hours., Disp: , Rfl:   •  metoprolol (LOPRESSOR) 25 MG Tab, Take 2 Tabs by mouth 2 times a day. Hold if your blood pressure is less than 95/50. (Patient not taking: Reported on 11/26/2018), Disp: 180 Tab, Rfl: 3  •  aspirin (ASA) 81 MG Chew Tab chewable tablet, Take 1 Tab by mouth every day. (Patient not taking: Reported on 11/26/2018), Disp: 100 Tab, Rfl: 1  •  granisetron (KYTRIL) 1 MG Tab, Take 1 Tab by mouth every 12 hours as needed for Nausea/Vomiting., Disp: 30 Tab, Rfl: 1  •  potassium chloride (MICRO-K) 10 MEQ capsule, Take 2 Caps by mouth 1 time daily as needed. WITH LASIX ONLY (Patient not taking: Reported on 11/26/2018), Disp: 60 Cap, Rfl: 2  •  ipratropium-albuterol (DUONEB) 0.5-2.5 (3) MG/3ML nebulizer solution, 3 mL by Nebulization route 4 times a day., Disp: 75 mL, Rfl: 2  •  estradiol (ESTRACE) 0.1 MG/GM vaginal cream, Insert 0.5g vaginally three times each week., Disp: 1 Tube, Rfl: 6  •  BD INTEGRA SYRINGE 25G X 1\" 3 ML Misc, USE 1 SYRINGE AS DIRECTED EVERY 30 DAYS, Disp: 11 Each, Rfl: 0  •  lidocaine (LIDODERM) 5 % PTCH, , Disp: , Rfl: 1  •  Probiotic Product (PROBIOTIC DAILY) CAPS, Take 1 Cap by mouth every day., Disp: , Rfl:   •  cetirizine (ZYRTEC) 10 MG TABS, Take 10 mg by mouth 1 time daily as needed. For allergies, Disp: , Rfl:   •  oxycodone 20 MG/ML CONC, Take 30 mg by mouth every four " "hours as needed. 20mg/ml solution, Disp: , Rfl:       Past medical, surgical, family, and social history is reviewed and updated in Epic chart by me today.   Medications and allergies reviewed and updated in Epic chart by me today.     REVIEW OF SYSTEMS:  GENERAL: increased fatigue, no weight loss.  HEENT:  Ears--no earache, no change in hearing, no dizziness, no tinnitus.                 Eyes--no blurred vision, no discharge or pain                 Throat--No sore throat, no dysphagia, no hoarseness  CV:  No chest pain,dyspnea,palpitations or edema.  RESP:  No sob,cough,wheezing or hemoptysis.  GI: Generalized abdominal pain that is little better today.  She does have Crohn's disease and has an ileostomy.  She has been having loose output--states this is normal for her.  :  No dysuria, polyuria, hematuria.  She states decreased urinary output.  MS: Chronic neck and back pain  NEURO:  No seizures, syncope, paralysis, tremor.  Generalized weakness.  SKIN: No new or concerning skin lesions or changes.   PSYCH: Mood fine.  As above.  She is angry today.    Vitals:    11/26/18 1400   BP: 124/70   Pulse: 74   Resp: 14   Temp: 36.4 °C (97.6 °F)   TempSrc: Temporal   SpO2: 98%   Weight: 76.7 kg (169 lb)   Height: 1.84 m (6' 0.44\")     Physical Exam:  Gen: Well developed, well nourished. No acute distress.  She is sitting in a wheelchair.  Alert and oriented x3.    She declined an exam.  She states she wants to leave.      Assessment/Plan:  1. Non-intractable vomiting with nausea, unspecified vomiting type .  I encouraged her  to take her back to the emergency room.  She is a very difficult lab draw and I am concerned that she is even more dehydrated than 3 days ago.  She has not been eating or drinking.  He agrees to do that.  I would like to arrange home health evaluation also.    2. Dehydration.  Encouraged emergency room for evaluation for probable dehydration.    3. Renal insufficiency.  I am concerned that " her renal insufficiency has worsened and she may be heading in the direction of acute renal failure.    4. Crohn's disease of colon without complication (HCC)     5. Ileostomy in place (HCC)     6. Weakness.  Would like home health evaluation with physical therapy, occupational therapy, nutrition and social work.    7. Marital stress strongly recommend that she be evaluated by behavioral health again.  Possibly this could be done during a hospital stay if necessary.    Keep GI appt   Follow up here 1 week.

## 2018-11-27 ENCOUNTER — HOME HEALTH ADMISSION (OUTPATIENT)
Dept: HOME HEALTH SERVICES | Facility: HOME HEALTHCARE | Age: 63
End: 2018-11-27
Payer: MEDICARE

## 2018-11-27 NOTE — ED NOTES
"Pt in Triage stating \"we are running out of hospitals, there are too many tragedies, you are trained like a robot to just smile and go about your business, nothing will make you mad.\"  VS rechecked. Pt rambling on, flighty thoughts.   "

## 2018-11-27 NOTE — ED NOTES
Pt asking about wait times, educated on the triage process and told that we could draw blood in order to get the process started. Pt refusing, stating that she needs an ultrasound blood draw which we are unable to do in Triage at this time.

## 2018-11-27 NOTE — ED NOTES
Received a call from Mc (Supervisor at [facility]) stating that they sent the patient here and that they are very concerned with her being abusive to her . States that the patient was very inappropriate verbally. Will inform charge RN and ERP when pt is brought back to a room

## 2018-11-27 NOTE — ED PROVIDER NOTES
ED Provider Note    Primary care provider: Amanda Bazzi M.D.  Means of arrival: private vehicle  History obtained from: patient  History limited by: none    CHIEF COMPLAINT  Chief Complaint   Patient presents with   • Vomiting     Pt reports N/V intermittently since 11/14. Pt reports worsening s/s since Friday. Pt states she is extremely dehydrated and needs IV fluids. Hx Crohns and ostomy. Pt has not been able to keep any medications down. Appt with Dr. Mahan tomorrow. Has hx a-fib and has not been on medication. Denies CP and SOB       HPI  Jana Overton is a 63 y.o. female who presents to the Emergency Department for nausea and vomiting. Patient reports that she has Crohns disease and has chronic nausea and vomiting. She usually takes oral zofran, however over the last few days this has not controlled her nausea and vomiting.  She has not been able to tolerate any oral intake over the last 3 days including her medications for her atrial fibrillation.  She is not having any other symptoms aside from nausea and vomiting which she reports is her normal baseline nausea.  She denies fevers, chills, abdominal pain, chest pain, and palpitations.  She reports that typically when she has the symptoms that she requires IV fluids and Zofran after which she usually feels improved.  She has an appointment with Dr. Mahan tomorrow and is hoping to avoid admission. She has had decreased ostomy output secondary to decreased oral intake.    REVIEW OF SYSTEMS  Review of Systems   Constitutional: Negative for chills and fever.   Respiratory: Negative for shortness of breath.    Cardiovascular: Negative for chest pain.   Gastrointestinal: Positive for nausea and vomiting. Negative for abdominal pain.   Genitourinary: Negative for dysuria.   Musculoskeletal: Negative for neck pain.   Neurological: Negative for dizziness and headaches.   All other systems reviewed and are negative.        PAST MEDICAL HISTORY   has a past  medical history of Anesthesia; Anxiety; Arthritis; ASTHMA; Atrial fib/flut; Backpain; Breath shortness; Bronchitis; Chronic pain; Colostomy in place ([facility]) (10/14/2010); COPD (chronic obstructive pulmonary disease) ([facility]) (6/24/2014); COPD (chronic obstructive pulmonary disease) ([facility]) (6/24/2014); Crohn's disease of colon ([facility]); Dyspnea; History of cardiac murmur; Hypokalemia (6/24/2014); Indigestion; Infectious disease; Multiple falls (6/24/2014); Narcotic dependence ([facility]) (9/23/2009); Obstruction; Other specified disorder of intestines; Pain (7/11/13); Pneumonia; Postherpetic neuralgia (6/24/2014); Rosacea (6/24/2014); and Sleep apnea.    SURGICAL HISTORY   has a past surgical history that includes lumbar fusion anterior; cervical disk and fusion anterior; foot surgery; hand surgery; other abdominal surgery; gastroscopy with biopsy (11/22/08); ileostomy (11/11/2009); dilation and curettage (9/24/2010); gastroscopy (10/4/2011); colonoscopy (10/4/2011); hardware removal neuro (7/16/2012); mammoplasty reduction (7/17/2013); ileostomy (5/14/2014); reduction of large breast; abdominal exploration; lumpectomy; and colon resection.    SOCIAL HISTORY  Social History   Substance Use Topics   • Smoking status: Never Smoker   • Smokeless tobacco: Never Used      Comment: second hand smoke   • Alcohol use Yes      Comment: rare      History   Drug Use     Comment: medical marijuana       FAMILY HISTORY  Family History   Problem Relation Age of Onset   • Lung Disease Mother         copd   • Diabetes Father    • Heart Disease Father    • Diabetes Sister    • Cancer Maternal Aunt         breast       CURRENT MEDICATIONS  Home Medications    **Home medications have not yet been reviewed for this encounter**         ALLERGIES  Allergies   Allergen Reactions   • Morphine Swelling   • Ativan [Lorazepam]      States give me nightmares.    • Azathioprine Sodium Hives   • Demerol Vomiting   • Elavil [Amitriptyline]      Nightmares     •  Fentanyl      Patches caused skin breakdown, no pain relief   • Kdc:Fentanyl    • Lyrica [Pregabalin]    • Methadone    • Methotrexate Hives and Rash   • Methotrexate Derivatives    • Pseudoephedrine Vomiting   • Remicade [Infliximab Injection] Hives   • Roxanol [Morphine Sulfate]      Throat swells   • Sulfa Drugs Hives   • Sulfasalazine    • Tape Rash     Skin peels off; paper tape   • Tizanidine Hydrochloride    • Carafate [Sucralfate] Rash     Generalizes/Mouth Sores   • Celestone [Betamethasone Sodium Phosphate]        PHYSICAL EXAM  VITAL SIGNS: /109   Pulse (!) 117   Temp 37.3 °C (99.1 °F) (Temporal)   Resp 18   Ht 1.829 m (6')   Wt 74.8 kg (165 lb)   SpO2 96%   BMI 22.38 kg/m²   Vitals reviewed by myself.  Physical Exam   Constitutional: She is oriented to person, place, and time and well-developed, well-nourished, and in no distress.   HENT:   Head: Normocephalic.   Eyes: EOM are normal.   Neck: Normal range of motion.   Cardiovascular:   Tachycardic, regular rhythm   Pulmonary/Chest: Effort normal and breath sounds normal.   Abdominal: Soft.   Ostomy site is clean and dry with no surrounding erythema.  Ostomy bag is currently empty.  Abdomen is soft and nontender.   Musculoskeletal: Normal range of motion.   Neurological: She is alert and oriented to person, place, and time.   Skin: Skin is warm and dry.       DIAGNOSTIC STUDIES /  LABS  None    COURSE & MEDICAL DECISION MAKING  Nursing notes, VS, PMSFHx reviewed in chart.    Patient is a 63-year-old female who comes in for nausea and vomiting.  Differential diagnosis includes Crohn's disease, acute on chronic nausea, dehydration, electrolyte abnormality, acute kidney injury.  Diagnostic workup includes labs.      Patient's initial vitals notable for tachycardia, this is likely secondary to dehydration versus untreated A. fib over the last few days.  Patient is requesting IV with Zofran and IV fluids as she has been unable to tolerate oral  intake at home.  Multiple ultrasound-guided IVs were attempted, and third attempt was successful, however patient insisted that the IV had infiltrated despite demonstrating good blood flow.  She refused to let us use this IV.  I offered to place an ultrasound-guided peripheral line in her neck which she refused.  She refused any further attempts at ultrasound-guided IVs.  Therefore she is treated with intramuscular Zofran after which she feels improved and is tolerating oral intake.  Her tachycardia resolved after treatment.  She is denying labs at this time.  She has follow-up with her gastroenterologist tomorrow.  Therefore she is given strict return precautions and discharged home in stable condition.      FINAL IMPRESSION  1. Nausea and vomiting, intractability of vomiting not specified, unspecified vomiting type

## 2018-11-27 NOTE — ED NOTES
"Pt c/o of pain at IV site and insistant that the IV has infiltrated, IV removed. ERP at pt BS to discuss pt options for tx. Pt given IM zofran per ERP order and ice chips, pt refused jello, would only eat yellow jello, pt aware that facility does not have yellow jello -  Pt offered other options, pt refused saying \"Im leaving anyway's, you cannot help me here\"  "

## 2018-11-27 NOTE — ED NOTES
Pt refused discharge vitals.     Pt given discharge instructions, pt verbalized understanding information given. Pt left ER w/ spouse.

## 2018-11-27 NOTE — TELEPHONE ENCOUNTER
"Jana came into the office today with her , arrived at 2:28 for her 2:00 appointment.  Jana came out of the exam room prior to being seen by Dr. Bazzi yelling that [facility] could take her money and anything else they wanted, but she would never be back.  I asked her what we could do for her and she answered that we should just take another organ from her.  She then allowed the wheelchair to roll down the sidewalk as she was yelling \"Goodbye [facility]\" and waving her hands in the air.    I went to the exam room and told her  that she had gone outside.  He asked if Dr. Bazzi was going to see her, and I told him Dr. Bazzi would be more than happy to see her, but that Jana had left.  He said he would try to get her to come back inside, which he did and Dr. Bazzi saw her.    When Jana was on her way out, she was continually yelling at her  to stop helping her, 'because he hasn't helped her for 40 years, why now\"      Once outside, her  was trying to get her into the truck, I went out and asked if they needed help or a stepstool.  Jana said no, but as I was walking away said \"bend my knees.\"  I asked what she meant, and she wanted me to  her foot and put it on the running board.  She then said to her , see what she did, if you weren't a frail worthless man you could have done that.  Then she yelled at me to go away.  "

## 2018-11-27 NOTE — ED NOTES
Pt verbally abusive to  in Triage, making passive aggressive comments.  calm and nice, trying to help explain pt's issue. Pt very back and forth with her attitude, nice to RN at one minute and accusatory the next.

## 2018-11-28 ENCOUNTER — HOME CARE VISIT (OUTPATIENT)
Dept: HOME HEALTH SERVICES | Facility: HOME HEALTHCARE | Age: 63
End: 2018-11-28
Payer: MEDICARE

## 2018-11-28 VITALS
DIASTOLIC BLOOD PRESSURE: 78 MMHG | HEART RATE: 87 BPM | SYSTOLIC BLOOD PRESSURE: 116 MMHG | TEMPERATURE: 98.6 F | WEIGHT: 167 LBS | OXYGEN SATURATION: 96 % | BODY MASS INDEX: 22.62 KG/M2 | RESPIRATION RATE: 16 BRPM | HEIGHT: 72 IN

## 2018-11-28 PROCEDURE — 665999 HH PPS REVENUE DEBIT

## 2018-11-28 PROCEDURE — 665998 HH PPS REVENUE CREDIT

## 2018-11-28 PROCEDURE — G0493 RN CARE EA 15 MIN HH/HOSPICE: HCPCS

## 2018-11-28 PROCEDURE — 665001 SOC-HOME HEALTH

## 2018-11-28 SDOH — ECONOMIC STABILITY: HOUSING INSECURITY: UNSAFE APPLIANCES: 0

## 2018-11-28 SDOH — ECONOMIC STABILITY: HOUSING INSECURITY
HOME SAFETY: PT HAS MULTIPLE RUGS, STAIRS, NO GRAB BARS OR SHOWER CHAIR THAT CREATES A POTENTIAL RISK AS A TRIP/SLIP HAZARD.

## 2018-11-28 SDOH — ECONOMIC STABILITY: HOUSING INSECURITY: UNSAFE COOKING RANGE AREA: 0

## 2018-11-28 ASSESSMENT — ACTIVITIES OF DAILY LIVING (ADL)
HOME_HEALTH_OASIS: 01
OASIS_M1830: 05

## 2018-11-28 ASSESSMENT — ENCOUNTER SYMPTOMS
SHORTNESS OF BREATH: T
POOR JUDGMENT: 1
NAUSEA: DAILY
SEVERE DYSPNEA: 1
DEBILITATING PAIN: 1
VOMITING: DAILY

## 2018-11-28 ASSESSMENT — PATIENT HEALTH QUESTIONNAIRE - PHQ9
2. FEELING DOWN, DEPRESSED, IRRITABLE, OR HOPELESS: 00
1. LITTLE INTEREST OR PLEASURE IN DOING THINGS: 00

## 2018-11-29 ENCOUNTER — TELEPHONE (OUTPATIENT)
Dept: HEALTH INFORMATION MANAGEMENT | Facility: OTHER | Age: 63
End: 2018-11-29

## 2018-11-29 ENCOUNTER — TELEPHONE (OUTPATIENT)
Dept: INTERNAL MEDICINE | Facility: IMAGING CENTER | Age: 63
End: 2018-11-29

## 2018-11-29 ENCOUNTER — HOME CARE VISIT (OUTPATIENT)
Dept: HOME HEALTH SERVICES | Facility: HOME HEALTHCARE | Age: 63
End: 2018-11-29
Payer: MEDICARE

## 2018-11-29 DIAGNOSIS — E86.0 DEHYDRATION: ICD-10-CM

## 2018-11-29 PROCEDURE — 665999 HH PPS REVENUE DEBIT

## 2018-11-29 PROCEDURE — 665998 HH PPS REVENUE CREDIT

## 2018-11-29 NOTE — TELEPHONE ENCOUNTER
Spoke with [facility] RN supervisor, Vickie. She will relay msg for patient to take Potassium 20 mEq daily to [facility] RN going to patient's home tomorrow.

## 2018-11-29 NOTE — TELEPHONE ENCOUNTER
Dear Dr. Bazzi,    Med review completed for home health. Patient noted by HH RN to not be taking potassium. Instructions state PRN when taking Lasix, however patient's last k+ level was low (3.3 on 11/23). Would you like to change potassium order at this time?    Thank you for your time,  Adrian Harrell, PharmD [phone]

## 2018-11-29 NOTE — TELEPHONE ENCOUNTER
Referral from [facility]. Med review completed. Patient noted by HH RN to not be taking potassium. Instructions state PRN, however patient's last k+ level was low. Msg sent to PCP. Unable to reach patient via phone. LVM with contact information.

## 2018-11-29 NOTE — TELEPHONE ENCOUNTER
Jana called and apologized for prior behavior in office.   She did go to the emergency room that day.  Unfortunately they were unable to get IV access.  She opted to go home again.  She now has home health.  She thinks she may have a bladder infection.  She is not having any burning or fevers but her urine is very dark.  I told her this is most likely because she is dehydrated but will have home health collect a urine sample.  She is encouraged to drink small amounts of fluids frequently.  Encouraged her to call for any additional concerns.

## 2018-11-30 ENCOUNTER — HOME CARE VISIT (OUTPATIENT)
Dept: HOME HEALTH SERVICES | Facility: HOME HEALTHCARE | Age: 63
End: 2018-11-30
Payer: MEDICARE

## 2018-11-30 ENCOUNTER — HOSPITAL ENCOUNTER (OUTPATIENT)
Facility: MEDICAL CENTER | Age: 63
End: 2018-11-30
Attending: FAMILY MEDICINE
Payer: MEDICARE

## 2018-11-30 VITALS
RESPIRATION RATE: 16 BRPM | TEMPERATURE: 98.8 F | HEART RATE: 88 BPM | SYSTOLIC BLOOD PRESSURE: 160 MMHG | OXYGEN SATURATION: 94 % | DIASTOLIC BLOOD PRESSURE: 70 MMHG

## 2018-11-30 DIAGNOSIS — E86.0 DEHYDRATION: ICD-10-CM

## 2018-11-30 LAB
AMORPH CRY #/AREA URNS HPF: PRESENT /HPF
APPEARANCE UR: ABNORMAL
BACTERIA #/AREA URNS HPF: NEGATIVE /HPF
BILIRUB UR QL STRIP.AUTO: NEGATIVE
CAOX CRY #/AREA URNS HPF: ABNORMAL /HPF
COLOR UR: YELLOW
EPI CELLS #/AREA URNS HPF: ABNORMAL /HPF
GLUCOSE UR STRIP.AUTO-MCNC: NEGATIVE MG/DL
HYALINE CASTS #/AREA URNS LPF: ABNORMAL /LPF
KETONES UR STRIP.AUTO-MCNC: 15 MG/DL
LEUKOCYTE ESTERASE UR QL STRIP.AUTO: NEGATIVE
MICRO URNS: ABNORMAL
NITRITE UR QL STRIP.AUTO: NEGATIVE
PH UR STRIP.AUTO: 5.5 [PH]
PROT UR QL STRIP: NEGATIVE MG/DL
RBC # URNS HPF: ABNORMAL /HPF
RBC UR QL AUTO: ABNORMAL
SP GR UR STRIP.AUTO: 1.03
UROBILINOGEN UR STRIP.AUTO-MCNC: 0.2 MG/DL
WBC #/AREA URNS HPF: ABNORMAL /HPF

## 2018-11-30 PROCEDURE — G0299 HHS/HOSPICE OF RN EA 15 MIN: HCPCS

## 2018-11-30 PROCEDURE — 81001 URINALYSIS AUTO W/SCOPE: CPT

## 2018-11-30 PROCEDURE — 665998 HH PPS REVENUE CREDIT

## 2018-11-30 PROCEDURE — 665999 HH PPS REVENUE DEBIT

## 2018-11-30 PROCEDURE — 6650409 HCR  CONTAINER SPECIMEN

## 2018-11-30 PROCEDURE — G0156 HHCP-SVS OF AIDE,EA 15 MIN: HCPCS

## 2018-11-30 ASSESSMENT — ENCOUNTER SYMPTOMS
NAUSEA: YES
VOMITING: YES
DEBILITATING PAIN: 1

## 2018-12-01 ENCOUNTER — HOME CARE VISIT (OUTPATIENT)
Dept: HOME HEALTH SERVICES | Facility: HOME HEALTHCARE | Age: 63
End: 2018-12-01
Payer: MEDICARE

## 2018-12-01 ENCOUNTER — TELEPHONE (OUTPATIENT)
Dept: INTERNAL MEDICINE | Facility: IMAGING CENTER | Age: 63
End: 2018-12-01

## 2018-12-01 VITALS
SYSTOLIC BLOOD PRESSURE: 160 MMHG | TEMPERATURE: 98.8 F | HEART RATE: 88 BPM | DIASTOLIC BLOOD PRESSURE: 70 MMHG | RESPIRATION RATE: 16 BRPM | OXYGEN SATURATION: 94 %

## 2018-12-01 PROCEDURE — 665999 HH PPS REVENUE DEBIT

## 2018-12-01 PROCEDURE — 665998 HH PPS REVENUE CREDIT

## 2018-12-01 NOTE — TELEPHONE ENCOUNTER
"Jana called convinced her stoma is leaking nder the skin. She states she is running a fever. She states the home health nurse saw her yesterday and did not believe her. Her urine is dark and she is in alot of pain.   No significant change from what she has been saying. Rambling about how her  is not helpful enough.   She is convinced she needs to go to the ER for all \"her ongoing issues\". She does not feel it can wait until Monday when Home health will be back.     "

## 2018-12-02 PROCEDURE — 665998 HH PPS REVENUE CREDIT

## 2018-12-02 PROCEDURE — 665999 HH PPS REVENUE DEBIT

## 2018-12-03 ENCOUNTER — PATIENT OUTREACH (OUTPATIENT)
Dept: HEALTH INFORMATION MANAGEMENT | Facility: OTHER | Age: 63
End: 2018-12-03

## 2018-12-03 ENCOUNTER — HOME CARE VISIT (OUTPATIENT)
Dept: HOME HEALTH SERVICES | Facility: HOME HEALTHCARE | Age: 63
End: 2018-12-03
Payer: MEDICARE

## 2018-12-03 ENCOUNTER — TELEPHONE (OUTPATIENT)
Dept: INTERNAL MEDICINE | Facility: IMAGING CENTER | Age: 63
End: 2018-12-03

## 2018-12-03 VITALS
DIASTOLIC BLOOD PRESSURE: 78 MMHG | TEMPERATURE: 98.6 F | HEART RATE: 92 BPM | RESPIRATION RATE: 18 BRPM | OXYGEN SATURATION: 98 % | SYSTOLIC BLOOD PRESSURE: 120 MMHG

## 2018-12-03 VITALS
SYSTOLIC BLOOD PRESSURE: 120 MMHG | RESPIRATION RATE: 16 BRPM | TEMPERATURE: 99.7 F | DIASTOLIC BLOOD PRESSURE: 78 MMHG | OXYGEN SATURATION: 94 % | HEART RATE: 106 BPM

## 2018-12-03 VITALS
TEMPERATURE: 98.6 F | DIASTOLIC BLOOD PRESSURE: 78 MMHG | OXYGEN SATURATION: 96 % | HEART RATE: 92 BPM | RESPIRATION RATE: 18 BRPM | SYSTOLIC BLOOD PRESSURE: 120 MMHG

## 2018-12-03 PROCEDURE — 665998 HH PPS REVENUE CREDIT

## 2018-12-03 PROCEDURE — G0153 HHCP-SVS OF S/L PATH,EA 15MN: HCPCS

## 2018-12-03 PROCEDURE — G0151 HHCP-SERV OF PT,EA 15 MIN: HCPCS

## 2018-12-03 PROCEDURE — G0299 HHS/HOSPICE OF RN EA 15 MIN: HCPCS

## 2018-12-03 PROCEDURE — 665999 HH PPS REVENUE DEBIT

## 2018-12-03 SDOH — ECONOMIC STABILITY: HOUSING INSECURITY: UNSAFE APPLIANCES: 0

## 2018-12-03 SDOH — ECONOMIC STABILITY: HOUSING INSECURITY: UNSAFE COOKING RANGE AREA: 0

## 2018-12-03 ASSESSMENT — ENCOUNTER SYMPTOMS
NAUSEA: INTERMITTENT
DEBILITATING PAIN: 1
POOR JUDGMENT: 1
DIFFICULTY THINKING: 1
DEBILITATING PAIN: 1
VOMITING: INTERMITTENT

## 2018-12-03 ASSESSMENT — ACTIVITIES OF DAILY LIVING (ADL)
IADLS_COMMENTS: <!--EPICS-->NA<!--EPICE-->
ADLS_COMMENTS: <!--EPICS-->NA<!--EPICE-->

## 2018-12-03 NOTE — TELEPHONE ENCOUNTER
"I spoke with Jana on the phone today.  She is convinced that her stoma is shrinking and it is \"popping\".  Even though the Home Health RN examined her on Friday and the stoma was assessed and determined to look pink and budded.  She states that there is something moving.  I am concerned that maybe she is hallucinating.  She states that she is going to go to the ER today to have them check it out but first she is going to get cleaned up and showered.  "

## 2018-12-03 NOTE — PROGRESS NOTES
Hi Dr. Bazzi,    Patient is unable to tolerate oral potassium d/t nausea. Explained nausea may be a symptom of hypokalemia. Patient refuses to take a dose. Patient passed phone to  who states oral nutrition is minimal. Would you like to place an order to have home health draw a potassium level?    Thanks,  Adrian Harrell, PharmD [phone]

## 2018-12-04 ENCOUNTER — APPOINTMENT (OUTPATIENT)
Dept: RADIOLOGY | Facility: MEDICAL CENTER | Age: 63
DRG: 386 | End: 2018-12-04
Attending: EMERGENCY MEDICINE
Payer: MEDICARE

## 2018-12-04 ENCOUNTER — HOSPITAL ENCOUNTER (INPATIENT)
Facility: MEDICAL CENTER | Age: 63
LOS: 1 days | DRG: 386 | End: 2018-12-05
Attending: EMERGENCY MEDICINE | Admitting: INTERNAL MEDICINE
Payer: MEDICARE

## 2018-12-04 ENCOUNTER — HOME CARE VISIT (OUTPATIENT)
Dept: HOME HEALTH SERVICES | Facility: HOME HEALTHCARE | Age: 63
End: 2018-12-04
Payer: MEDICARE

## 2018-12-04 DIAGNOSIS — K50.019 CROHN'S DISEASE OF SMALL INTESTINE WITH COMPLICATION (HCC): ICD-10-CM

## 2018-12-04 DIAGNOSIS — Z86.718 HISTORY OF DVT (DEEP VEIN THROMBOSIS): ICD-10-CM

## 2018-12-04 DIAGNOSIS — R10.9 ABDOMINAL PAIN, UNSPECIFIED ABDOMINAL LOCATION: ICD-10-CM

## 2018-12-04 DIAGNOSIS — E86.0 DEHYDRATION: ICD-10-CM

## 2018-12-04 DIAGNOSIS — N28.9 RENAL INSUFFICIENCY: ICD-10-CM

## 2018-12-04 DIAGNOSIS — R11.2 NON-INTRACTABLE VOMITING WITH NAUSEA, UNSPECIFIED VOMITING TYPE: ICD-10-CM

## 2018-12-04 DIAGNOSIS — E87.6 HYPOKALEMIA: ICD-10-CM

## 2018-12-04 DIAGNOSIS — I48.0 PAROXYSMAL ATRIAL FIBRILLATION (HCC): ICD-10-CM

## 2018-12-04 LAB
ALBUMIN SERPL BCP-MCNC: 4.4 G/DL (ref 3.2–4.9)
ALBUMIN/GLOB SERPL: 1.3 G/DL
ALP SERPL-CCNC: 92 U/L (ref 30–99)
ALT SERPL-CCNC: 16 U/L (ref 2–50)
ANION GAP SERPL CALC-SCNC: 21 MMOL/L (ref 0–11.9)
APPEARANCE UR: CLEAR
AST SERPL-CCNC: 24 U/L (ref 12–45)
BASOPHILS # BLD AUTO: 0.3 % (ref 0–1.8)
BASOPHILS # BLD: 0.04 K/UL (ref 0–0.12)
BILIRUB SERPL-MCNC: 0.8 MG/DL (ref 0.1–1.5)
BILIRUB UR QL STRIP.AUTO: NEGATIVE
BUN SERPL-MCNC: 33 MG/DL (ref 8–22)
CALCIUM SERPL-MCNC: 10.5 MG/DL (ref 8.5–10.5)
CHLORIDE SERPL-SCNC: 101 MMOL/L (ref 96–112)
CO2 SERPL-SCNC: 15 MMOL/L (ref 20–33)
COLOR UR: YELLOW
CREAT SERPL-MCNC: 1.34 MG/DL (ref 0.5–1.4)
CRP SERPL HS-MCNC: 6.3 MG/L (ref 0–7.5)
EOSINOPHIL # BLD AUTO: 0.01 K/UL (ref 0–0.51)
EOSINOPHIL NFR BLD: 0.1 % (ref 0–6.9)
ERYTHROCYTE [DISTWIDTH] IN BLOOD BY AUTOMATED COUNT: 41 FL (ref 35.9–50)
ERYTHROCYTE [SEDIMENTATION RATE] IN BLOOD BY WESTERGREN METHOD: 38 MM/HOUR (ref 0–30)
GLOBULIN SER CALC-MCNC: 3.5 G/DL (ref 1.9–3.5)
GLUCOSE SERPL-MCNC: 98 MG/DL (ref 65–99)
GLUCOSE UR STRIP.AUTO-MCNC: NEGATIVE MG/DL
HCT VFR BLD AUTO: 44.9 % (ref 37–47)
HGB BLD-MCNC: 14.9 G/DL (ref 12–16)
IMM GRANULOCYTES # BLD AUTO: 0.07 K/UL (ref 0–0.11)
IMM GRANULOCYTES NFR BLD AUTO: 0.5 % (ref 0–0.9)
KETONES UR STRIP.AUTO-MCNC: 40 MG/DL
LACTATE BLD-SCNC: 1.9 MMOL/L (ref 0.5–2)
LEUKOCYTE ESTERASE UR QL STRIP.AUTO: NEGATIVE
LIPASE SERPL-CCNC: 8 U/L (ref 11–82)
LYMPHOCYTES # BLD AUTO: 0.97 K/UL (ref 1–4.8)
LYMPHOCYTES NFR BLD: 7.3 % (ref 22–41)
MCH RBC QN AUTO: 29.6 PG (ref 27–33)
MCHC RBC AUTO-ENTMCNC: 33.2 G/DL (ref 33.6–35)
MCV RBC AUTO: 89.3 FL (ref 81.4–97.8)
MICRO URNS: ABNORMAL
MONOCYTES # BLD AUTO: 0.99 K/UL (ref 0–0.85)
MONOCYTES NFR BLD AUTO: 7.5 % (ref 0–13.4)
NEUTROPHILS # BLD AUTO: 11.16 K/UL (ref 2–7.15)
NEUTROPHILS NFR BLD: 84.3 % (ref 44–72)
NITRITE UR QL STRIP.AUTO: NEGATIVE
NRBC # BLD AUTO: 0 K/UL
NRBC BLD-RTO: 0 /100 WBC
PH UR STRIP.AUTO: 5.5 [PH]
PLATELET # BLD AUTO: 262 K/UL (ref 164–446)
PMV BLD AUTO: 11.5 FL (ref 9–12.9)
POTASSIUM SERPL-SCNC: 4 MMOL/L (ref 3.6–5.5)
PROT SERPL-MCNC: 7.9 G/DL (ref 6–8.2)
PROT UR QL STRIP: NEGATIVE MG/DL
RBC # BLD AUTO: 5.03 M/UL (ref 4.2–5.4)
RBC UR QL AUTO: NEGATIVE
SODIUM SERPL-SCNC: 137 MMOL/L (ref 135–145)
SP GR UR STRIP.AUTO: 1.03
UROBILINOGEN UR STRIP.AUTO-MCNC: 0.2 MG/DL
WBC # BLD AUTO: 13.2 K/UL (ref 4.8–10.8)

## 2018-12-04 PROCEDURE — 96365 THER/PROPH/DIAG IV INF INIT: CPT

## 2018-12-04 PROCEDURE — 86141 C-REACTIVE PROTEIN HS: CPT

## 2018-12-04 PROCEDURE — 83690 ASSAY OF LIPASE: CPT

## 2018-12-04 PROCEDURE — 665999 HH PPS REVENUE DEBIT

## 2018-12-04 PROCEDURE — 99222 1ST HOSP IP/OBS MODERATE 55: CPT | Mod: AI | Performed by: INTERNAL MEDICINE

## 2018-12-04 PROCEDURE — 96376 TX/PRO/DX INJ SAME DRUG ADON: CPT

## 2018-12-04 PROCEDURE — A9270 NON-COVERED ITEM OR SERVICE: HCPCS | Performed by: INTERNAL MEDICINE

## 2018-12-04 PROCEDURE — 770004 HCHG ROOM/CARE - ONCOLOGY PRIVATE *

## 2018-12-04 PROCEDURE — 96361 HYDRATE IV INFUSION ADD-ON: CPT

## 2018-12-04 PROCEDURE — 665998 HH PPS REVENUE CREDIT

## 2018-12-04 PROCEDURE — 85652 RBC SED RATE AUTOMATED: CPT

## 2018-12-04 PROCEDURE — 74177 CT ABD & PELVIS W/CONTRAST: CPT

## 2018-12-04 PROCEDURE — 700111 HCHG RX REV CODE 636 W/ 250 OVERRIDE (IP): Performed by: INTERNAL MEDICINE

## 2018-12-04 PROCEDURE — 700117 HCHG RX CONTRAST REV CODE 255: Performed by: EMERGENCY MEDICINE

## 2018-12-04 PROCEDURE — 83605 ASSAY OF LACTIC ACID: CPT

## 2018-12-04 PROCEDURE — 81003 URINALYSIS AUTO W/O SCOPE: CPT

## 2018-12-04 PROCEDURE — 87040 BLOOD CULTURE FOR BACTERIA: CPT

## 2018-12-04 PROCEDURE — 700105 HCHG RX REV CODE 258: Performed by: EMERGENCY MEDICINE

## 2018-12-04 PROCEDURE — 36415 COLL VENOUS BLD VENIPUNCTURE: CPT

## 2018-12-04 PROCEDURE — 700111 HCHG RX REV CODE 636 W/ 250 OVERRIDE (IP): Performed by: EMERGENCY MEDICINE

## 2018-12-04 PROCEDURE — 99285 EMERGENCY DEPT VISIT HI MDM: CPT

## 2018-12-04 PROCEDURE — 85025 COMPLETE CBC W/AUTO DIFF WBC: CPT

## 2018-12-04 PROCEDURE — 80053 COMPREHEN METABOLIC PANEL: CPT

## 2018-12-04 PROCEDURE — 96375 TX/PRO/DX INJ NEW DRUG ADDON: CPT

## 2018-12-04 PROCEDURE — 700105 HCHG RX REV CODE 258: Performed by: INTERNAL MEDICINE

## 2018-12-04 PROCEDURE — 700102 HCHG RX REV CODE 250 W/ 637 OVERRIDE(OP): Performed by: INTERNAL MEDICINE

## 2018-12-04 RX ORDER — GRANISETRON HYDROCHLORIDE 1 MG/1
1 TABLET, FILM COATED ORAL EVERY 12 HOURS PRN
Status: DISCONTINUED | OUTPATIENT
Start: 2018-12-04 | End: 2018-12-04

## 2018-12-04 RX ORDER — PREDNISONE 20 MG/1
40 TABLET ORAL DAILY
Status: DISCONTINUED | OUTPATIENT
Start: 2018-12-05 | End: 2018-12-04

## 2018-12-04 RX ORDER — ONDANSETRON 4 MG/1
4 TABLET, ORALLY DISINTEGRATING ORAL EVERY 6 HOURS PRN
Status: DISCONTINUED | OUTPATIENT
Start: 2018-12-04 | End: 2018-12-04

## 2018-12-04 RX ORDER — SODIUM CHLORIDE 9 MG/ML
INJECTION, SOLUTION INTRAVENOUS CONTINUOUS
Status: DISCONTINUED | OUTPATIENT
Start: 2018-12-04 | End: 2018-12-05 | Stop reason: HOSPADM

## 2018-12-04 RX ORDER — CETIRIZINE HYDROCHLORIDE 10 MG/1
10 TABLET ORAL
Status: DISCONTINUED | OUTPATIENT
Start: 2018-12-04 | End: 2018-12-04

## 2018-12-04 RX ORDER — DIAZEPAM 5 MG/1
5 TABLET ORAL EVERY 6 HOURS PRN
Status: DISCONTINUED | OUTPATIENT
Start: 2018-12-04 | End: 2018-12-04

## 2018-12-04 RX ORDER — BISACODYL 10 MG
10 SUPPOSITORY, RECTAL RECTAL
Status: DISCONTINUED | OUTPATIENT
Start: 2018-12-04 | End: 2018-12-04

## 2018-12-04 RX ORDER — ALBUTEROL SULFATE 90 UG/1
2 AEROSOL, METERED RESPIRATORY (INHALATION) EVERY 6 HOURS PRN
Status: DISCONTINUED | OUTPATIENT
Start: 2018-12-04 | End: 2018-12-04

## 2018-12-04 RX ORDER — HYDROMORPHONE HYDROCHLORIDE 1 MG/ML
0.5 INJECTION, SOLUTION INTRAMUSCULAR; INTRAVENOUS; SUBCUTANEOUS ONCE
Status: COMPLETED | OUTPATIENT
Start: 2018-12-04 | End: 2018-12-04

## 2018-12-04 RX ORDER — OXYCODONE HCL 20 MG/ML
30 CONCENTRATE, ORAL ORAL EVERY 4 HOURS PRN
Status: DISCONTINUED | OUTPATIENT
Start: 2018-12-04 | End: 2018-12-04

## 2018-12-04 RX ORDER — OXYCODONE HYDROCHLORIDE 10 MG/1
30 TABLET ORAL EVERY 4 HOURS PRN
Status: DISCONTINUED | OUTPATIENT
Start: 2018-12-04 | End: 2018-12-04

## 2018-12-04 RX ORDER — ONDANSETRON 2 MG/ML
4 INJECTION INTRAMUSCULAR; INTRAVENOUS ONCE
Status: COMPLETED | OUTPATIENT
Start: 2018-12-04 | End: 2018-12-04

## 2018-12-04 RX ORDER — POTASSIUM CHLORIDE 750 MG/1
20 CAPSULE, EXTENDED RELEASE ORAL
Status: DISCONTINUED | OUTPATIENT
Start: 2018-12-04 | End: 2018-12-04

## 2018-12-04 RX ORDER — METOCLOPRAMIDE HYDROCHLORIDE 5 MG/ML
10 INJECTION INTRAMUSCULAR; INTRAVENOUS ONCE
Status: COMPLETED | OUTPATIENT
Start: 2018-12-04 | End: 2018-12-04

## 2018-12-04 RX ORDER — ONDANSETRON 4 MG/1
4 TABLET, ORALLY DISINTEGRATING ORAL EVERY 4 HOURS PRN
Status: DISCONTINUED | OUTPATIENT
Start: 2018-12-04 | End: 2018-12-05 | Stop reason: HOSPADM

## 2018-12-04 RX ORDER — SODIUM CHLORIDE 9 MG/ML
500 INJECTION, SOLUTION INTRAVENOUS
Status: DISCONTINUED | OUTPATIENT
Start: 2018-12-04 | End: 2018-12-05 | Stop reason: HOSPADM

## 2018-12-04 RX ORDER — LIDOCAINE 50 MG/G
1 PATCH TOPICAL EVERY 24 HOURS
Status: DISCONTINUED | OUTPATIENT
Start: 2018-12-05 | End: 2018-12-04

## 2018-12-04 RX ORDER — IPRATROPIUM BROMIDE AND ALBUTEROL SULFATE 2.5; .5 MG/3ML; MG/3ML
3 SOLUTION RESPIRATORY (INHALATION) 4 TIMES DAILY
Status: DISCONTINUED | OUTPATIENT
Start: 2018-12-04 | End: 2018-12-04

## 2018-12-04 RX ORDER — SPIRONOLACTONE 25 MG/1
50 TABLET ORAL
Status: DISCONTINUED | OUTPATIENT
Start: 2018-12-05 | End: 2018-12-05 | Stop reason: HOSPADM

## 2018-12-04 RX ORDER — ASPIRIN 81 MG/1
81 TABLET, CHEWABLE ORAL DAILY
Status: DISCONTINUED | OUTPATIENT
Start: 2018-12-05 | End: 2018-12-04

## 2018-12-04 RX ORDER — AMOXICILLIN 250 MG
2 CAPSULE ORAL 2 TIMES DAILY
Status: DISCONTINUED | OUTPATIENT
Start: 2018-12-04 | End: 2018-12-04

## 2018-12-04 RX ORDER — POLYETHYLENE GLYCOL 3350 17 G/17G
1 POWDER, FOR SOLUTION ORAL
Status: DISCONTINUED | OUTPATIENT
Start: 2018-12-04 | End: 2018-12-04

## 2018-12-04 RX ORDER — ZINC OXIDE 13 %
1 CREAM (GRAM) TOPICAL DAILY
Status: DISCONTINUED | OUTPATIENT
Start: 2018-12-05 | End: 2018-12-04

## 2018-12-04 RX ORDER — AZELASTINE 1 MG/ML
1 SPRAY, METERED NASAL 2 TIMES DAILY
Status: DISCONTINUED | OUTPATIENT
Start: 2018-12-04 | End: 2018-12-04

## 2018-12-04 RX ORDER — OXYCODONE HCL 20 MG/ML
30 CONCENTRATE, ORAL ORAL EVERY 4 HOURS PRN
Status: DISCONTINUED | OUTPATIENT
Start: 2018-12-04 | End: 2018-12-05 | Stop reason: HOSPADM

## 2018-12-04 RX ORDER — SODIUM CHLORIDE 9 MG/ML
1000 INJECTION, SOLUTION INTRAVENOUS ONCE
Status: COMPLETED | OUTPATIENT
Start: 2018-12-04 | End: 2018-12-04

## 2018-12-04 RX ADMIN — HYDROMORPHONE HYDROCHLORIDE 0.5 MG: 1 INJECTION, SOLUTION INTRAMUSCULAR; INTRAVENOUS; SUBCUTANEOUS at 15:28

## 2018-12-04 RX ADMIN — HYDROMORPHONE HYDROCHLORIDE 0.5 MG: 1 INJECTION, SOLUTION INTRAMUSCULAR; INTRAVENOUS; SUBCUTANEOUS at 16:50

## 2018-12-04 RX ADMIN — SODIUM CHLORIDE 1000 ML: 9 INJECTION, SOLUTION INTRAVENOUS at 15:28

## 2018-12-04 RX ADMIN — ONDANSETRON 4 MG: 2 INJECTION INTRAMUSCULAR; INTRAVENOUS at 15:27

## 2018-12-04 RX ADMIN — CEFTRIAXONE SODIUM 2 G: 2 INJECTION, POWDER, FOR SOLUTION INTRAMUSCULAR; INTRAVENOUS at 20:57

## 2018-12-04 RX ADMIN — SODIUM CHLORIDE: 9 INJECTION, SOLUTION INTRAVENOUS at 23:41

## 2018-12-04 RX ADMIN — ONDANSETRON 4 MG: 2 INJECTION INTRAMUSCULAR; INTRAVENOUS at 18:46

## 2018-12-04 RX ADMIN — METOCLOPRAMIDE 10 MG: 5 INJECTION, SOLUTION INTRAMUSCULAR; INTRAVENOUS at 20:55

## 2018-12-04 RX ADMIN — IOHEXOL 100 ML: 350 INJECTION, SOLUTION INTRAVENOUS at 17:57

## 2018-12-04 ASSESSMENT — ENCOUNTER SYMPTOMS
CHILLS: 1
PALPITATIONS: 0
NERVOUS/ANXIOUS: 0
INSOMNIA: 0
EYE PAIN: 0
MYALGIAS: 0
FALLS: 0
WHEEZING: 0
VOMITING: 1
FEVER: 1
HEADACHES: 0
SEIZURES: 0
DIARRHEA: 0
CONSTIPATION: 0
LOSS OF CONSCIOUSNESS: 0
HEMOPTYSIS: 0
ABDOMINAL PAIN: 1
BLOOD IN STOOL: 0
TREMORS: 0
DIZZINESS: 0
FOCAL WEAKNESS: 0
COUGH: 0
EYE REDNESS: 0
NAUSEA: 1
SHORTNESS OF BREATH: 0
WEAKNESS: 1

## 2018-12-04 ASSESSMENT — COPD QUESTIONNAIRES
COPD SCREENING SCORE: 4
HAVE YOU SMOKED AT LEAST 100 CIGARETTES IN YOUR ENTIRE LIFE: NO/DON'T KNOW
DO YOU EVER COUGH UP ANY MUCUS OR PHLEGM?: YES, A FEW DAYS A WEEK OR MONTH
DURING THE PAST 4 WEEKS HOW MUCH DID YOU FEEL SHORT OF BREATH: SOME OF THE TIME

## 2018-12-04 ASSESSMENT — LIFESTYLE VARIABLES: EVER_SMOKED: NEVER

## 2018-12-04 ASSESSMENT — PAIN SCALES - GENERAL: PAINLEVEL_OUTOF10: 10

## 2018-12-04 NOTE — ED NOTES
"Patient is adamant that she \"is a hard stick and that no one can get an IV in here, they are going to have to put a port in\".   "

## 2018-12-04 NOTE — ED PROVIDER NOTES
"ED Provider Note    Scribed for Kelly Cheek M.D. by Jose M Barnett. 12/4/2018, 2:44 PM.    Primary care provider: Amanda Bazzi M.D.  Means of arrival: Ambulance  History obtained from: Ambulance  History limited by: None     CHIEF COMPLAINT  Chief Complaint   Patient presents with   • Pain   • Abdominal Pain     HPI  Jana Overton is a 63 y.o. female who presents to the Emergency Department for abdominal pain.   The patient describes an initial onset of symptoms 3 weeks ago when she developed an onset of vomiting. The patient reports intermittent episodes of vomiting, usually after eating for the last 3 weeks. She complains of associated symptoms of intermittent fever and bilateral lower abdominal soreness. She describes her symptoms as feeling similar to her history of Crohn's disease. She has not treated her symptoms with any medications, and denies any alleviating factors. The patient has not been compliant with her \"heart meds\" secondary to her vomiting.   The patient has a chronic history of Crohn's disease, and follows up with Dr. Cruz, from surgery as well as Dr. Mahan from GI.    The patient denies any cough, shortness of breath, chest pain, hematemesis, hematochezia, melena.     REVIEW OF SYSTEMS  Pertinent positives include bilateral lower abdominal soreness, fever, vomiting. Pertinent negatives include no cough, shortness of breath, chest pain, hematemesis, hematochezia, melena.  All other systems reviewed and negative.    PAST MEDICAL HISTORY   has a past medical history of Anesthesia; Anxiety; Arthritis; ASTHMA; Atrial fib/flut; Backpain; Breath shortness; Bronchitis; Chronic pain; Colostomy in place ([facility]) (10/14/2010); COPD (chronic obstructive pulmonary disease) ([facility]) (6/24/2014); COPD (chronic obstructive pulmonary disease) ([facility]) (6/24/2014); Crohn's disease of colon ([facility]); Dyspnea; History of cardiac murmur; Hypokalemia (6/24/2014); Indigestion; Infectious disease; Multiple falls " (6/24/2014); Narcotic dependence (HCC) (9/23/2009); Obstruction; Other specified disorder of intestines; Pain (7/11/13); Pneumonia; Postherpetic neuralgia (6/24/2014); Rosacea (6/24/2014); and Sleep apnea.    SURGICAL HISTORY   has a past surgical history that includes lumbar fusion anterior; cervical disk and fusion anterior; foot surgery; hand surgery; other abdominal surgery; gastroscopy with biopsy (11/22/08); ileostomy (11/11/2009); dilation and curettage (9/24/2010); gastroscopy (10/4/2011); colonoscopy (10/4/2011); hardware removal neuro (7/16/2012); mammoplasty reduction (7/17/2013); ileostomy (5/14/2014); reduction of large breast; abdominal exploration; lumpectomy; and colon resection.    SOCIAL HISTORY  Social History   Substance Use Topics   • Smoking status: Never Smoker   • Smokeless tobacco: Never Used      Comment: second hand smoke   • Alcohol use Yes      Comment: rare      History   Drug Use     Comment: medical marijuana     FAMILY HISTORY  Family History   Problem Relation Age of Onset   • Lung Disease Mother         copd   • Diabetes Father    • Heart Disease Father    • Diabetes Sister    • Cancer Maternal Aunt         breast     CURRENT MEDICATIONS  Current Outpatient Prescriptions on File Prior to Encounter   Medication Sig Dispense Refill   • diazePAM (VALIUM) 5 MG Tab Take 5 mg by mouth every 6 hours as needed (muscle spasms).     • nystatin (MYCOSTATIN) 296182 UNIT/ML Suspension SHAKE LIQUID AND TAKE 5 ML BY MOUTH FOUR TIMES DAILY 120 mL 0   • spironolactone (ALDACTONE) 25 MG Tab TAKE 2 TABLETS BY MOUTH TWICE DAILY 360 Tab 3   • albuterol 108 (90 Base) MCG/ACT Aero Soln inhalation aerosol Inhale 2 Puffs by mouth every 6 hours as needed for Shortness of Breath. 1 Inhaler 1   • torsemide (DEMADEX) 10 MG tablet Take 1 Tab by mouth 1 time daily as needed. Prn swelling (Patient not taking: Reported on 11/26/2018) 30 Tab 1   • azelastine (ASTELIN) 137 MCG/SPRAY nasal spray Red House 1 Red House in nose  2 times a day. 30 mL 1   • ondansetron (ZOFRAN ODT) 4 MG TABLET DISPERSIBLE Take 1 Tab by mouth every 6 hours as needed for Nausea. 60 Tab 2   • Magnesium 100 MG Tab Take 100 mg by mouth every 48 hours.     • metoprolol (LOPRESSOR) 25 MG Tab Take 2 Tabs by mouth 2 times a day. Hold if your blood pressure is less than 95/50. (Patient not taking: Reported on 11/26/2018) 180 Tab 3   • aspirin (ASA) 81 MG Chew Tab chewable tablet Take 1 Tab by mouth every day. (Patient not taking: Reported on 11/26/2018) 100 Tab 1   • granisetron (KYTRIL) 1 MG Tab Take 1 Tab by mouth every 12 hours as needed for Nausea/Vomiting. 30 Tab 1   • potassium chloride (MICRO-K) 10 MEQ capsule Take 2 Caps by mouth 1 time daily as needed. WITH LASIX ONLY (Patient not taking: Reported on 11/26/2018) 60 Cap 2   • ipratropium-albuterol (DUONEB) 0.5-2.5 (3) MG/3ML nebulizer solution 3 mL by Nebulization route 4 times a day. 75 mL 2   • estradiol (ESTRACE) 0.1 MG/GM vaginal cream Insert 0.5g vaginally three times each week. 1 Tube 6   • lidocaine (LIDODERM) 5 % PTCH Apply 1 Patch to skin as directed every 24 hours.  1   • Probiotic Product (PROBIOTIC DAILY) CAPS Take 1 Cap by mouth every day.     • cetirizine (ZYRTEC) 10 MG TABS Take 10 mg by mouth 1 time daily as needed. For allergies     • oxycodone 20 MG/ML CONC Take 30 mg by mouth every four hours as needed (pain). 20mg/ml solution        ALLERGIES  Allergies   Allergen Reactions   • Morphine Swelling   • Ativan [Lorazepam]      States give me nightmares.    • Azathioprine Sodium Hives   • Demerol Vomiting   • Elavil [Amitriptyline]      Nightmares     • Fentanyl      Patches caused skin breakdown, no pain relief   • Kdc:Fentanyl    • Lyrica [Pregabalin]    • Methadone    • Methotrexate Hives and Rash   • Methotrexate Derivatives    • Pseudoephedrine Vomiting   • Remicade [Infliximab Injection] Hives   • Roxanol [Morphine Sulfate]      Throat swells   • Sulfa Drugs Hives   • Sulfasalazine    •  Tape Rash     Skin peels off; paper tape   • Tizanidine Hydrochloride    • Carafate [Sucralfate] Rash     Generalizes/Mouth Sores   • Celestone [Betamethasone Sodium Phosphate]      PHYSICAL EXAM  VITAL SIGNS: /99   Pulse (!) 114   Temp 36.7 °C (98 °F) (Tympanic)   Resp 18   Ht 1.829 m (6')   Wt 74 kg (163 lb 2.3 oz)   BMI 22.13 kg/m²   Constitutional: Anxious. Alert in no apparent distress.  HENT: No signs of trauma, Bilateral external ears normal, Nose normal. Dry mucous membranes.   Eyes: Pupils are equal and reactive, Conjunctiva normal, Non-icteric.   Neck: Normal range of motion, No tenderness, Supple, No stridor.   Cardiovascular: Tachycardic rate, no murmurs.   Thorax & Lungs: Normal breath sounds, No respiratory distress, No wheezing, No chest tenderness.   Abdomen: Bowel sounds normal, Soft, Diffuse abdominal tenderness to palptation. No masses, No peritoneal signs.    Skin: Warm, Dry, No erythema, No rash.   Musculoskeletal:  No major deformities noted.  Neurologic: Alert, moving all extremities without difficulty, no focal deficits.    LABS  Labs Reviewed   CBC WITH DIFFERENTIAL - Abnormal; Notable for the following:        Result Value    WBC 13.2 (*)     MCHC 33.2 (*)     Neutrophils-Polys 84.30 (*)     Lymphocytes 7.30 (*)     Neutrophils (Absolute) 11.16 (*)     Lymphs (Absolute) 0.97 (*)     Monos (Absolute) 0.99 (*)     All other components within normal limits   COMP METABOLIC PANEL - Abnormal; Notable for the following:     Co2 15 (*)     Anion Gap 21.0 (*)     Bun 33 (*)     All other components within normal limits   LIPASE - Abnormal; Notable for the following:     Lipase 8 (*)     All other components within normal limits   URINALYSIS,CULTURE IF INDICATED - Abnormal; Notable for the following:     Ketones 40 (*)     All other components within normal limits   WESTERGREN SED RATE - Abnormal; Notable for the following:     Sed Rate Westergren 38 (*)     All other components within  normal limits   ESTIMATED GFR - Abnormal; Notable for the following:     GFR If  48 (*)     GFR If Non  40 (*)     All other components within normal limits   CRP HIGH SENSITIVE (CARDIAC)   BLOOD CULTURE   BLOOD CULTURE   LACTIC ACID     All labs reviewed by me.    RADIOLOGY  CT-ABDOMEN-PELVIS WITH   Final Result      1.  Very mild distention of proximal jejunal loops with a focal transition in the anterior left abdomen. Findings may be related to mild inflammation related to Crohn's disease or an adhesion. No significant surrounding inflammatory stranding or abscess    identified.      2.  Status post colectomy with left lower quadrant ileostomy.           The radiologist's interpretation of all radiological studies have been reviewed by me.    COURSE & MEDICAL DECISION MAKING  Pertinent Labs & Imaging studies reviewed. (See chart for details)    Differential diagnoses include but are not limited to: bowel obstruction, dehydration, electrolyte abnormality, intraabdominal abscess.    2:44 PM - Patient seen and examined at bedside. Patient will be treated with Dilaudid 0.5 mg IV. Ordered CBC, CMP, lipase, urinalysis to evaluate her symptoms.      4:43 PM Recheck: Patient re-evaluated at beside. Heart rate is improved after resuscitation with IV fluids. Discussed CT scan results with the patient.     6:11 PM Recheck: Patient re-evaluated at beside. The patient reports her pain is improved. She still complains of some mild nausea. She was treated with IV Reglan. Discussed plan of care for admission to the hospital with patient and her  who are agreeable for plan.     6:30 PM Discussed patient's condition with Dr. Miller, Hospitalist, who is agreeable to admit the patient.     HYDRATION: Based on the patient's presentation of Dehydration the patient was given IV fluids. IV Hydration was used because oral hydration failed due to vomiting. Upon recheck following hydration, the  patient's heart rate is improved.      Decision Making:  This is a 63 y.o. year old female who presents with vomiting and abdominal pain.  Patient has a history of Crohn's disease and has been vomiting for a number of days more acutely.  She does appear dehydrated and was given IV fluids with improvement of her heart rate afterwards.  Her labs do show an elevated anion gap consistent with dehydration.  IV was established and she was hydration CT scan shows maybe some underlying inflammation related to Crohn's versus obstruction.  She will be admitted for continued hydration and possible consultation with her GI doctor as needed.      Disposition:  Patient will be admitted to the hospital under the care of Dr. Miller (Hospitalist) in guarded condition.       FINAL IMPRESSION  1. Dehydration    2. Non-intractable vomiting with nausea, unspecified vomiting type    3. Abdominal pain, unspecified abdominal location           This dictation has been created using voice recognition software and/or scribes. The accuracy of the dictation is limited by the abilities of the software and the expertise of the scribes. I expect there may be some errors of grammar and possibly content. I made every attempt to manually correct the errors within my dictation. However, errors related to voice recognition software and/or scribes may still exist and should be interpreted within the appropriate context.     IJose M (Scribe), am scribing for, and in the presence of, Kelly Cheek M.D..    Electronically signed by: Jose M Barnett (Scribe), 12/4/2018    Kelly DE LA CRUZ M.D. personally performed the services described in this documentation, as scribed by Jose M Barnett in my presence, and it is both accurate and complete.    The note accurately reflects work and decisions made by me.  Kelly Cheek  12/4/2018  8:08 PM

## 2018-12-04 NOTE — PROGRESS NOTES
Dianne,  Please ask home health to attempt drawing a BMP tomorrow. (order is ready)  Explain that I realize she is difficult draw, but maybe we will be alina.  Thank you.   arabella

## 2018-12-04 NOTE — ED TRIAGE NOTES
Chief Complaint   Patient presents with   • Pain   • Abdominal Pain       BIB by ems for above complaint. Patient states that she has had crohn's disease since she was 12. Patient has ileostomy. Patient has multiple complaints and is very tangential. Patient placed on monitor and in gown. VSS.

## 2018-12-05 ENCOUNTER — HOME CARE VISIT (OUTPATIENT)
Dept: HOME HEALTH SERVICES | Facility: HOME HEALTHCARE | Age: 63
End: 2018-12-05
Payer: MEDICARE

## 2018-12-05 ENCOUNTER — APPOINTMENT (OUTPATIENT)
Dept: RADIOLOGY | Facility: MEDICAL CENTER | Age: 63
DRG: 386 | End: 2018-12-05
Attending: INTERNAL MEDICINE
Payer: MEDICARE

## 2018-12-05 ENCOUNTER — PATIENT OUTREACH (OUTPATIENT)
Dept: HEALTH INFORMATION MANAGEMENT | Facility: OTHER | Age: 63
End: 2018-12-05

## 2018-12-05 VITALS
HEART RATE: 88 BPM | DIASTOLIC BLOOD PRESSURE: 77 MMHG | TEMPERATURE: 98.3 F | HEIGHT: 72 IN | BODY MASS INDEX: 22.1 KG/M2 | WEIGHT: 163.14 LBS | SYSTOLIC BLOOD PRESSURE: 152 MMHG | RESPIRATION RATE: 18 BRPM | OXYGEN SATURATION: 95 %

## 2018-12-05 LAB
ANION GAP SERPL CALC-SCNC: 14 MMOL/L (ref 0–11.9)
BUN SERPL-MCNC: 21 MG/DL (ref 8–22)
C DIFF DNA SPEC QL NAA+PROBE: NEGATIVE
C DIFF TOX GENS STL QL NAA+PROBE: NEGATIVE
CALCIUM SERPL-MCNC: 9.5 MG/DL (ref 8.5–10.5)
CHLORIDE SERPL-SCNC: 107 MMOL/L (ref 96–112)
CO2 SERPL-SCNC: 21 MMOL/L (ref 20–33)
CREAT SERPL-MCNC: 0.95 MG/DL (ref 0.5–1.4)
ERYTHROCYTE [DISTWIDTH] IN BLOOD BY AUTOMATED COUNT: 41.1 FL (ref 35.9–50)
GLUCOSE SERPL-MCNC: 97 MG/DL (ref 65–99)
HCT VFR BLD AUTO: 40.2 % (ref 37–47)
HGB BLD-MCNC: 13 G/DL (ref 12–16)
MCH RBC QN AUTO: 29.2 PG (ref 27–33)
MCHC RBC AUTO-ENTMCNC: 32.3 G/DL (ref 33.6–35)
MCV RBC AUTO: 90.3 FL (ref 81.4–97.8)
PLATELET # BLD AUTO: 203 K/UL (ref 164–446)
PMV BLD AUTO: 11.6 FL (ref 9–12.9)
POTASSIUM SERPL-SCNC: 3.4 MMOL/L (ref 3.6–5.5)
RBC # BLD AUTO: 4.45 M/UL (ref 4.2–5.4)
SODIUM SERPL-SCNC: 142 MMOL/L (ref 135–145)
WBC # BLD AUTO: 8.9 K/UL (ref 4.8–10.8)
WBC STL QL MICRO: NORMAL

## 2018-12-05 PROCEDURE — A9270 NON-COVERED ITEM OR SERVICE: HCPCS | Performed by: INTERNAL MEDICINE

## 2018-12-05 PROCEDURE — 665998 HH PPS REVENUE CREDIT

## 2018-12-05 PROCEDURE — 700111 HCHG RX REV CODE 636 W/ 250 OVERRIDE (IP): Performed by: INTERNAL MEDICINE

## 2018-12-05 PROCEDURE — 36415 COLL VENOUS BLD VENIPUNCTURE: CPT

## 2018-12-05 PROCEDURE — 700102 HCHG RX REV CODE 250 W/ 637 OVERRIDE(OP): Performed by: INTERNAL MEDICINE

## 2018-12-05 PROCEDURE — 87045 FECES CULTURE AEROBIC BACT: CPT

## 2018-12-05 PROCEDURE — 85027 COMPLETE CBC AUTOMATED: CPT

## 2018-12-05 PROCEDURE — 87493 C DIFF AMPLIFIED PROBE: CPT

## 2018-12-05 PROCEDURE — 80048 BASIC METABOLIC PNL TOTAL CA: CPT

## 2018-12-05 PROCEDURE — 700111 HCHG RX REV CODE 636 W/ 250 OVERRIDE (IP): Performed by: FAMILY MEDICINE

## 2018-12-05 PROCEDURE — 89055 LEUKOCYTE ASSESSMENT FECAL: CPT

## 2018-12-05 PROCEDURE — 700101 HCHG RX REV CODE 250: Performed by: INTERNAL MEDICINE

## 2018-12-05 PROCEDURE — 87046 STOOL CULTR AEROBIC BACT EA: CPT

## 2018-12-05 PROCEDURE — 665999 HH PPS REVENUE DEBIT

## 2018-12-05 PROCEDURE — 99239 HOSP IP/OBS DSCHRG MGMT >30: CPT | Performed by: INTERNAL MEDICINE

## 2018-12-05 RX ORDER — METOCLOPRAMIDE HYDROCHLORIDE 5 MG/ML
10 INJECTION INTRAMUSCULAR; INTRAVENOUS ONCE
Status: COMPLETED | OUTPATIENT
Start: 2018-12-05 | End: 2018-12-05

## 2018-12-05 RX ORDER — HYDROMORPHONE HYDROCHLORIDE 1 MG/ML
0.5 INJECTION, SOLUTION INTRAMUSCULAR; INTRAVENOUS; SUBCUTANEOUS ONCE
Status: COMPLETED | OUTPATIENT
Start: 2018-12-05 | End: 2018-12-05

## 2018-12-05 RX ADMIN — ONDANSETRON 4 MG: 4 TABLET, ORALLY DISINTEGRATING ORAL at 01:38

## 2018-12-05 RX ADMIN — HYDROMORPHONE HYDROCHLORIDE 0.5 MG: 1 INJECTION, SOLUTION INTRAMUSCULAR; INTRAVENOUS; SUBCUTANEOUS at 05:07

## 2018-12-05 RX ADMIN — PROCHLORPERAZINE EDISYLATE 10 MG: 5 INJECTION INTRAMUSCULAR; INTRAVENOUS at 11:31

## 2018-12-05 RX ADMIN — METOCLOPRAMIDE 10 MG: 5 INJECTION, SOLUTION INTRAMUSCULAR; INTRAVENOUS at 05:07

## 2018-12-05 RX ADMIN — PROCHLORPERAZINE EDISYLATE 10 MG: 5 INJECTION INTRAMUSCULAR; INTRAVENOUS at 17:31

## 2018-12-05 RX ADMIN — OXYCODONE HYDROCHLORIDE 30 MG: 100 SOLUTION ORAL at 01:39

## 2018-12-05 ASSESSMENT — PAIN SCALES - GENERAL
PAINLEVEL_OUTOF10: 7
PAINLEVEL_OUTOF10: 8
PAINLEVEL_OUTOF10: 8
PAINLEVEL_OUTOF10: 10
PAINLEVEL_OUTOF10: 8

## 2018-12-05 ASSESSMENT — COGNITIVE AND FUNCTIONAL STATUS - GENERAL
MOVING FROM LYING ON BACK TO SITTING ON SIDE OF FLAT BED: A LITTLE
WALKING IN HOSPITAL ROOM: A LITTLE
TOILETING: A LITTLE
DRESSING REGULAR UPPER BODY CLOTHING: A LITTLE
MOBILITY SCORE: 20
SUGGESTED CMS G CODE MODIFIER MOBILITY: CJ
DRESSING REGULAR LOWER BODY CLOTHING: A LITTLE
STANDING UP FROM CHAIR USING ARMS: A LITTLE
HELP NEEDED FOR BATHING: A LITTLE
PERSONAL GROOMING: A LITTLE
SUGGESTED CMS G CODE MODIFIER DAILY ACTIVITY: CK
CLIMB 3 TO 5 STEPS WITH RAILING: A LITTLE
EATING MEALS: A LITTLE
DAILY ACTIVITIY SCORE: 18

## 2018-12-05 ASSESSMENT — COPD QUESTIONNAIRES
HAVE YOU SMOKED AT LEAST 100 CIGARETTES IN YOUR ENTIRE LIFE: NO/DON'T KNOW
COPD SCREENING SCORE: 2
IN THE PAST 12 MONTHS DO YOU DO LESS THAN YOU USED TO BECAUSE OF YOUR BREATHING PROBLEMS: DISAGREE/UNSURE
DURING THE PAST 4 WEEKS HOW MUCH DID YOU FEEL SHORT OF BREATH: NONE/LITTLE OF THE TIME
DO YOU EVER COUGH UP ANY MUCUS OR PHLEGM?: NO/ONLY WITH OCCASIONAL COLDS OR INFECTIONS

## 2018-12-05 ASSESSMENT — LIFESTYLE VARIABLES
EVER_SMOKED: NEVER
ALCOHOL_USE: NO

## 2018-12-05 ASSESSMENT — PATIENT HEALTH QUESTIONNAIRE - PHQ9
SUM OF ALL RESPONSES TO PHQ9 QUESTIONS 1 AND 2: 0
2. FEELING DOWN, DEPRESSED, IRRITABLE, OR HOPELESS: NOT AT ALL
1. LITTLE INTEREST OR PLEASURE IN DOING THINGS: NOT AT ALL

## 2018-12-05 NOTE — H&P
"Hospital Medicine History & Physical Note    Date of Service  12/4/2018    Primary Care Physician  Amanda Bazzi M.D.    Consultants  Call GI and Surgery in the AM    Code Status  full    Chief Complaint  Pain and Abdominal Pain      History of Presenting Illness  63 y.o. female who presented 12/4/2018 with Pain and Abdominal Pain  From home and has [facility]  Difficult historian and seems to want pain medications  History of Crohn's followed by Dr. Mahan, GI, and s/p colon resection and ostomy placement followed by Dr. Cruz.  History of paroxysmal Afib not on chronic anticoagulation or aspirin as she says she easily bleeds from her GI tract. She says she follows Dr. Boucher, Cardiology who is aware.     She complains of 2d increased ostomy output and that her stoma is inflamed (even though there is no erythema around it) and had decreased in size from 1 1/2 to 7/8 inches in diameter.  According to her, Dr. Cruz at one point had mulled revising her stoma. The stoma she felt was raw feeling like \"acid\" and she did not agree with her [facility] who thought it was looking okay.  She also complains of longstanding left lower abdominal pain, intractable nausea and vomiting. Her pain is out of proportion to exam, but she is retching, no vomitus, during the interview. She also appears anxious and would cry to her  when she feels nauseous.   She admits to missed appointments to Dr. Mahan and Dr. Cruz.   Also complained of subjective fevers, sweats, nausea, vomiting getting worse 2 weeks. SHe has had Crohn's exacerbation before. She says cannot tolerate 5-ASA and steroids of any kind. She declined prednisone. She prefers Dr. Mahan to review.    At the ED, she is afebrile and hemodynamically stable.  CT scan showed:  1.  Very mild distention of proximal jejunal loops with a focal transition in the anterior left abdomen. Findings may be related to mild inflammation related to Crohn's disease or an adhesion. No " significant surrounding inflammatory stranding or abscess   identified.  2.  Status post colectomy with left lower quadrant ileostomy.  Mild leukocytosis. Normal lactic.    When I saw her at the ED, no acute distress. Retching but she says she can tolerate clears. Ostomy noted. Pain out of proportion to exam.  at bedside.    Review of Systems  Review of Systems   Constitutional: Positive for chills, fever and malaise/fatigue.   HENT: Negative for congestion, hearing loss and nosebleeds.    Eyes: Negative for pain and redness.   Respiratory: Negative for cough, hemoptysis, shortness of breath and wheezing.    Cardiovascular: Negative for chest pain and palpitations.   Gastrointestinal: Positive for abdominal pain, nausea and vomiting. Negative for blood in stool, constipation and diarrhea.   Genitourinary: Negative for dysuria, frequency and hematuria.   Musculoskeletal: Negative for falls, joint pain and myalgias.   Skin: Negative for rash.   Neurological: Positive for weakness. Negative for dizziness, tremors, focal weakness, seizures, loss of consciousness and headaches.   Psychiatric/Behavioral: The patient is not nervous/anxious and does not have insomnia.    All other systems reviewed and are negative.    Past Medical History   has a past medical history of Anesthesia; Anxiety; Arthritis; ASTHMA; Atrial fib/flut; Backpain; Breath shortness; Bronchitis; Chronic pain; Colostomy in place ([facility]) (10/14/2010); COPD (chronic obstructive pulmonary disease) ([facility]) (6/24/2014); COPD (chronic obstructive pulmonary disease) ([facility]) (6/24/2014); Crohn's disease of colon ([facility]); Dyspnea; History of cardiac murmur; Hypokalemia (6/24/2014); Indigestion; Infectious disease; Multiple falls (6/24/2014); Narcotic dependence ([facility]) (9/23/2009); Obstruction; Other specified disorder of intestines; Pain (7/11/13); Pneumonia; Postherpetic neuralgia (6/24/2014); Rosacea (6/24/2014); and Sleep apnea. She also has no past medical  history of CAD (coronary artery disease); Liver disease; or Seizure disorder (HCC).    Surgical History   has a past surgical history that includes lumbar fusion anterior; cervical disk and fusion anterior; foot surgery; hand surgery; other abdominal surgery; gastroscopy with biopsy (11/22/08); ileostomy (11/11/2009); dilation and curettage (9/24/2010); gastroscopy (10/4/2011); colonoscopy (10/4/2011); hardware removal neuro (7/16/2012); mammoplasty reduction (7/17/2013); ileostomy (5/14/2014); pr reduction of large breast; abdominal exploration; lumpectomy; and colon resection.     Family History  family history includes Cancer in her maternal aunt; Diabetes in her father and sister; Heart Disease in her father; Lung Disease in her mother.     Social History   reports that she has never smoked. She has never used smokeless tobacco. She reports that she drinks alcohol. She reports that she uses drugs.    Allergies  Allergies   Allergen Reactions   • Morphine Swelling   • Ativan [Lorazepam]      States give me nightmares.    • Azathioprine Sodium Hives   • Demerol Vomiting   • Elavil [Amitriptyline]      Nightmares     • Fentanyl      Patches caused skin breakdown, no pain relief   • Kdc:Fentanyl    • Lyrica [Pregabalin]    • Methadone    • Methotrexate Hives and Rash   • Methotrexate Derivatives    • Pseudoephedrine Vomiting   • Remicade [Infliximab Injection] Hives   • Roxanol [Morphine Sulfate]      Throat swells   • Sulfa Drugs Hives   • Sulfasalazine    • Tape Rash     Skin peels off; paper tape   • Tizanidine Hydrochloride    • Carafate [Sucralfate] Rash     Generalizes/Mouth Sores   • Celestone [Betamethasone Sodium Phosphate]        Medications  Prior to Admission Medications   Prescriptions Last Dose Informant Patient Reported? Taking?   Magnesium 100 MG Tab 12/3/2018 at 0830 Patient Yes No   Sig: Take 100 mg by mouth every 48 hours.   aspirin EC (ECOTRIN) 81 MG Tablet Delayed Response 12/3/2018 at 0830  Patient Yes Yes   Sig: Take 81 mg by mouth every day.   granisetron (KYTRIL) 1 MG Tab 11/28/2018 at 1230 Patient No No   Sig: Take 1 Tab by mouth every 12 hours as needed for Nausea/Vomiting.   metoprolol (LOPRESSOR) 25 MG Tab 12/3/2018 at 0830 Patient Yes Yes   Sig: Take 25 mg by mouth 2 times a day.   ondansetron (ZOFRAN ODT) 4 MG TABLET DISPERSIBLE 12/4/2018 at 0800 Patient No No   Sig: Take 1 Tab by mouth every 6 hours as needed for Nausea.   oxycodone 20 MG/ML CONC 12/3/2018 at 1200 Patient Yes No   Sig: Take 30 mg by mouth every four hours as needed (pain). 20mg/ml solution   spironolactone (ALDACTONE) 25 MG Tab 12/3/2018 at 0830 Patient No No   Sig: TAKE 2 TABLETS BY MOUTH TWICE DAILY      Facility-Administered Medications: None       Physical Exam  Temp:  [36.7 °C (98 °F)] 36.7 °C (98 °F)  Pulse:  [] 94  Resp:  [12-18] 16  BP: (147)/(99) 147/99    Physical Exam   Constitutional: She appears well-developed and well-nourished.   Thin   HENT:   Head: Normocephalic and atraumatic.   Eyes: Conjunctivae and EOM are normal. No scleral icterus.   Neck: Normal range of motion. Neck supple.   Cardiovascular: Normal rate and regular rhythm.  Exam reveals no gallop and no friction rub.    No murmur heard.  Pulmonary/Chest: Effort normal and breath sounds normal. No respiratory distress. She has no wheezes. She has no rales.   Abdominal: Soft. Bowel sounds are normal. She exhibits no distension. There is no tenderness. There is no rebound and no guarding.   Ostomy bag noted  No apparent leakage, no erythema around stoma.   Musculoskeletal: She exhibits no edema or tenderness.   Neurological: She is alert.   Skin: Skin is warm.   Psychiatric: She has a normal mood and affect. Her behavior is normal.       Laboratory:  Recent Labs      12/04/18   1509   WBC  13.2*   RBC  5.03   HEMOGLOBIN  14.9   HEMATOCRIT  44.9   MCV  89.3   MCH  29.6   MCHC  33.2*   RDW  41.0   PLATELETCT  262   MPV  11.5     Recent Labs       12/04/18   1509   SODIUM  137   POTASSIUM  4.0   CHLORIDE  101   CO2  15*   GLUCOSE  98   BUN  33*   CREATININE  1.34   CALCIUM  10.5     Recent Labs      12/04/18   1509   ALTSGPT  16   ASTSGOT  24   ALKPHOSPHAT  92   TBILIRUBIN  0.8   LIPASE  8*   GLUCOSE  98                 No results for input(s): TROPONINI in the last 72 hours.    Urinalysis:    Recent Labs      12/04/18   1656   SPECGRAVITY  1.026   GLUCOSEUR  Negative   KETONES  40*   NITRITE  Negative   LEUKESTERAS  Negative        Imaging:  CT-ABDOMEN-PELVIS WITH   Final Result      1.  Very mild distention of proximal jejunal loops with a focal transition in the anterior left abdomen. Findings may be related to mild inflammation related to Crohn's disease or an adhesion. No significant surrounding inflammatory stranding or abscess    identified.      2.  Status post colectomy with left lower quadrant ileostomy.         US-EXTREMITY VENOUS LOWER BILAT    (Results Pending)         Assessment/Plan:  I anticipate this patient will require at least two midnights for appropriate medical management, necessitating inpatient admission.    * Crohn's disease of small intestine with complication (HCC)- (present on admission)   Assessment & Plan    Possible mild exacerbation  ESR 38  Historically she does not like steroids or 5-ASA as it upsets her stomach.  She will however take it if Dr. Mahan, her GI doctor feels it is needed.  For now will cover for possible infectious duodenitis, obtain ostomy samples for culture and Cdiff testing and empiric antibiotics.  Please call Dr. Mahan, GI in the AM     Chronic respiratory failure with hypoxia (HCC)- (present on admission)   Assessment & Plan    Stable. Continue resp and O2 per protocol     History of DVT (deep vein thrombosis)- (present on admission)   Assessment & Plan    Not on anticoagulation  Declines even aspirin  CTA Chest in 2015 no PE  Ordered US Dopplers       Paroxysmal atrial fibrillation (HCC)- (present  on admission)   Assessment & Plan    Not on anticoagulation. She declines even aspirin. She says she is high risk GI bleeding  Currently rate controlled, continue metoprolol     COPD (chronic obstructive pulmonary disease) (HCC)- (present on admission)   Assessment & Plan    Currently not exacerbating     Ileostomy in place (HCC)- (present on admission)   Assessment & Plan    Wound care for ostomy  Please call Dr. Cruz in the AM as patient feels she needs her stoma revised.     Chronic pain- (present on admission)   Assessment & Plan    Pain control ordered  Advised her nthat narcotics can cause more nausea and upset bowel       Reviewed vitals, labs, imaging, staff notes.  Discussed assessment and plan with Jana Overton and . Gave reassurance.  Discussed with ED physician.      VTE prophylaxis: SCDs

## 2018-12-05 NOTE — PROGRESS NOTES
· 2 RN skin check complete.   · Devices in place: Ileostomy, IV.  · Skin assessed under devices: Yes.  · Confirmed pressure ulcers found: None found.  · New potential pressure ulcers noted: None noted  · Interventions in place: Patient turns self from side to side, barrier wipes in use, ileostomy in place.

## 2018-12-05 NOTE — CARE PLAN
Problem: Communication  Goal: The ability to communicate needs accurately and effectively will improve  Outcome: NOT MET  Pt educated on medications and interventions. However, pt is refusing medications and interventions.    Problem: Safety  Goal: Will remain free from injury  Outcome: PROGRESSING AS EXPECTED  Room is close to nursing station. Bed alarm on. Calls for assistance.

## 2018-12-05 NOTE — ASSESSMENT & PLAN NOTE
Wound care for ostomy  Please call Dr. Cruz in the AM as patient feels she needs her stoma revised.

## 2018-12-05 NOTE — PROGRESS NOTES
Pt up to R309. 2 RN skin check complete. Admit profile complete. Pt's  is at bedside. Educated pt and  regarding isolation precautions for c-diff and they are agreeable. Stool sample sent down to lab. IVF running per orders. PRN zofran and oxycodone given for nausea and pain. Ileostomy in place with small amount of liquid, dark brown/green output. Skin is intact however patient is adamant that there are 2 wounds on her buttocks. No wounds seen by this RN.  is at bedside. Call light within reach, room near nurses station. Pt educated to call if she needs to get up, pt verbalized understanding.

## 2018-12-05 NOTE — PROGRESS NOTES
"This RN was paged to patient's room as patient was requesting more pain and nausea medication. This RN educated patient that her medications were not due for another 40 minutes. The patient immediately got upset and was yelling at this RN stating, \"I refuse to take those medications anymore. You guys do not believe me that I am throwing it up. Test my vomit if you need to!\". This RN apologized and educated the patient to call if her medications are not helping her pain or nausea. Patient requesting IV dilaudid and IV reglan as these help her symptoms. Call to Dr. Quintanilla made and orders received for 0.5mg IV dilaudid once and 10mg IV reglan once. Updated pt on POC and she is agreeable.   "

## 2018-12-05 NOTE — ASSESSMENT & PLAN NOTE
Possible mild exacerbation  ESR 38  Historically she does not like steroids or 5-ASA as it upsets her stomach.  She will however take it if Dr. Mahan, her GI doctor feels it is needed.  For now will cover for possible infectious duodenitis, obtain ostomy samples for culture and Cdiff testing and empiric antibiotics.  Please call LONNIE Castillo in the AM

## 2018-12-05 NOTE — ASSESSMENT & PLAN NOTE
Not on anticoagulation. She declines even aspirin. She says she is high risk GI bleeding  Currently rate controlled, continue metoprolol

## 2018-12-05 NOTE — DISCHARGE PLANNING
Patient is currently on service with [facility]. Please write home care orders upon discharge to home for continuum of care.[facility] at  /[phone] if there are any questions.

## 2018-12-05 NOTE — RESPIRATORY CARE
COPD EDUCATION by COPD CLINICAL EDUCATOR  12/5/2018 at 8:31 AM by Kristyn Rodas     Patient reviewed by COPD education team. Patient does not qualify for COPD program.

## 2018-12-05 NOTE — CARE PLAN
Problem: Bowel/Gastric:  Goal: Normal bowel function is maintained or improved  Outcome: PROGRESSING SLOWER THAN EXPECTED    Intervention: Educate patient and significant other/support system about diet, fluid intake, medications and activity to promote bowel function  Liquid brown/green output from ostomy.  Stool sample sent down to lab.      Problem: Pain Management  Goal: Pain level will decrease to patient's comfort goal  Outcome: PROGRESSING AS EXPECTED    Intervention: Follow pain managment plan developed in collaboration with patient and Interdisciplinary Team  PRN oxycodone given for pain control.

## 2018-12-05 NOTE — PROGRESS NOTES
Patient called this RN about 20 minutes after administration of Reglan stating it did not work and she is vomiting. Vomit is frothy in appearance and a small amount. Offered patient PRN ODT zofran and she refused this stating it doesn't work. Pt states she is fine for the time being.

## 2018-12-05 NOTE — PROGRESS NOTES
"/72   Pulse 76   Temp 36.6 °C (97.9 °F) (Temporal)   Resp 18   Ht 1.829 m (6')   Wt 74 kg (163 lb 2.3 oz)   SpO2 94%   Breastfeeding? No   BMI 22.13 kg/m²     Pt is AOX4. Denies SOB, chest pain. n/v present- compazine given- pt states, \"no more nausea\" after administration. Pain is 8/10- declining oxycodone at this time. Pt states, \"only dilaudid helps.\" MD notified. Left PIV running NS @125 mL/hr. Pt refusing all meds at this time. Ostomy- intact- output is liquid green/brown. Wound care came to bedside to assess. Bed is locked in lowest position. POC discussed.   "

## 2018-12-05 NOTE — ED NOTES
Med rec updated and complete.  Allergies reviewed. Pt denies   Antibiotic use in last 30 days at home.  Removed most of the pts prn medications  As she has not taken them in months.  Pt reports not taking her medications  Consistently  Because she has  Been sick.

## 2018-12-05 NOTE — DISCHARGE INSTRUCTIONS
Discharge Instructions    Discharged to home by car with relative. Discharged via wheelchair, hospital escort: Yes.  Special equipment needed: Not Applicable    Be sure to schedule a follow-up appointment with your primary care doctor or any specialists as instructed.     Discharge Plan:   Diet Plan: Discussed  Activity Level: Discussed  Confirmed Follow up Appointment: Patient to Call and Schedule Appointment  Confirmed Symptoms Management: Discussed  Medication Reconciliation Updated: Yes  Influenza Vaccine Indication: Patient Refuses    I understand that a diet low in cholesterol, fat, and sodium is recommended for good health. Unless I have been given specific instructions below for another diet, I accept this instruction as my diet prescription.   Other diet: Regular     Special Instructions: None    · Is patient discharged on Warfarin / Coumadin?   No     Depression / Suicide Risk    As you are discharged from this [facility], it is important to learn how to keep safe from harming yourself.    Recognize the warning signs:  · Abrupt changes in personality, positive or negative- including increase in energy   · Giving away possessions  · Change in eating patterns- significant weight changes-  positive or negative  · Change in sleeping patterns- unable to sleep or sleeping all the time   · Unwillingness or inability to communicate  · Depression  · Unusual sadness, discouragement and loneliness  · Talk of wanting to die  · Neglect of personal appearance   · Rebelliousness- reckless behavior  · Withdrawal from people/activities they love  · Confusion- inability to concentrate     If you or a loved one observes any of these behaviors or has concerns about self-harm, here's what you can do:  · Talk about it- your feelings and reasons for harming yourself  · Remove any means that you might use to hurt yourself (examples: pills, rope, extension cords, firearm)  · Get professional help from the community  (Mental Health, Substance Abuse, psychological counseling)  · Do not be alone:Call your Safe Contact- someone whom you trust who will be there for you.  · Call your local CRISIS HOTLINE 081-6958 or 244-836-4376  · Call your local Children's Mobile Crisis Response Team [place] (195) 173-8419 or www."[employer]"  · Call the toll free National Suicide Prevention Hotlines   · National Suicide Prevention Lifeline 081-427-OAGO (4050)  · National Hope Line Network 800-SUICIDE (205-3063)

## 2018-12-05 NOTE — WOUND TEAM
"[facility]   Inpatient Services   Established Ostomy Management/ troubleshooting  HPI: Reviewed  PMH: Reviewed   SH: Reviewed   Reason for Ostomy nurse consult:  Established ostomy       Subjective:  \"It's just acid.\"      Objective:  In bed, previous appliance intact.  Patient has own convatec supplies, independent with care but wanted staff to do appliance change.    Ostomy type:  Ileostomy   Stoma location:   LLQ   Stoma assessment:    Appearance:  Pink     Size:  7/8 \"        Protrusion:  Budded >1\"   Output:  Moderate, watery, brown      MC jxn:  Intact     Peristomal skin:  Intact      Ostomy Appliance (type and size):  2 piece convatec with no sting skin prep and adhesive remover         Interventions and Education:  Previous appliance removed, cleansed with warm wash cloth.  No sting skin prep applied to skin.  Convatec barrier pre-cut applied to skin and pouch applied and clamped.  Updated RN.  No other needs at this time.        Evaluation:  Patient states her stoma was \"retracted\" is better since hydrating.  States she has been throwing up for a month.  Stoma and chelsea-stomal skin is intact and appears healthy at this time.  Per doctor note, surgical consult for revision.  Patient states she thinks there is a leak and an abscess.           Plan:   Nursing to assist patient with changes PRN leaking and at patients request.        Anticipated discharge needs:  None.     "

## 2018-12-06 ENCOUNTER — HOME CARE VISIT (OUTPATIENT)
Dept: HOME HEALTH SERVICES | Facility: HOME HEALTHCARE | Age: 63
End: 2018-12-06
Payer: MEDICARE

## 2018-12-06 ENCOUNTER — TELEPHONE (OUTPATIENT)
Dept: INTERNAL MEDICINE | Facility: IMAGING CENTER | Age: 63
End: 2018-12-06

## 2018-12-06 DIAGNOSIS — K50.019 CROHN'S DISEASE OF SMALL INTESTINE WITH COMPLICATION (HCC): ICD-10-CM

## 2018-12-06 DIAGNOSIS — R11.2 NAUSEA AND VOMITING, INTRACTABILITY OF VOMITING NOT SPECIFIED, UNSPECIFIED VOMITING TYPE: ICD-10-CM

## 2018-12-06 PROCEDURE — 665998 HH PPS REVENUE CREDIT

## 2018-12-06 PROCEDURE — 665999 HH PPS REVENUE DEBIT

## 2018-12-06 NOTE — TELEPHONE ENCOUNTER
Spoke with Goyo.  Jana was hospitalized and did receive fluids.  She was discharged home and I reviewed her discharge summary.  I spoke also with Dr. Megan Kwong.   Jana was a difficult patient, angry and not cooperative per Dr. Kwong's notes.  I shared this with Goyo and he is aware of it.    She is still having nausea and vomiting.  I told him she may use Zofran every 6 hours and we will try and expedite a visit with Dr. Mahan.  She still has home health and will again attempt social work and nutrition consults as well.

## 2018-12-06 NOTE — DISCHARGE PLANNING
ATTN: Case Management  RE: Referral for Home Health                We would like to take this opportunity to thank you for submitting a referral for your patient to continue care with [facility]. Our skilled team is dedicated to helping all patients recover and gain independence in the home setting.            As of 12/6/2018, we have accepted the above patient into our service. A [facility] clinician will be out to see the patient within 48 hours to conduct our initial visit. If you have any questions or concerns regarding the patient’s transition to Home Health, please do not hesitate to contact us. We are open for referrals 7 days a week from 8AM to 5PM at [phone].      We look forward to collaborating with you,  [facility] Team

## 2018-12-06 NOTE — FACE TO FACE
Face to Face Supporting Documentation - Home Health    The encounter with this patient was in whole or in part the primary reason for home health admission.    Date of encounter:   Patient:                    MRN:                       YOB: 2020  Jana Overton  [id number]  1955     Resume home health as established prior to admission.    I certify the face to face encounter for this home health care referral meets the CMS requirements and the encounter/clinical assessment with the patient was, in whole, or in part, for the medical condition(s) listed above, which is the primary reason for home health care. Based on my clinical findings: the service(s) are medically necessary, support the need for home health care, and the homebound criteria are met.  I certify that this patient has had a face to face encounter by myself.  Megan Kwong D.O. - NPI: [id number]

## 2018-12-06 NOTE — DISCHARGE SUMMARY
"Discharge Summary    CHIEF COMPLAINT ON ADMISSION  Chief Complaint   Patient presents with   • Pain   • Abdominal Pain       Reason for Admission  Crohn's disease of small intestine*     Admission Date  12/4/2018    CODE STATUS  Full Code    HPI & HOSPITAL COURSE  This is a 63 y.o. female here with abdominal pain for which she is on chronic pain medication per her pain management doctor.  She has history of Crohns disease and reported this felt similar to some of her exacerbations but would not take steroids or mesalamine because of previous reactions.  She has had multiple visits to the ED with similar complaints but had left prior to admission as she did not trust nursing to be able to place an IV, one occasion was because the jello that was offered to her wasn't yellow and that was the only one she would eat.  She appeared dehydrated and had a slightly elevated white count and was given IV fluids and IV antibiotics.  She was very angry and had made demands of the nursing staff that were not appropriate, she would change her demeanor quickly from anxious to angry to ambivalent and would likely benefit from a psychiatric evaluation and recommendation but she stated she did not want any further interventions from [facility] and when offered discharge home she changed her tone to gracious and passive when 5 minutes prior she was very derogatory with all care she has \"EVER\" received from [facility] and would never come back.  She did repeatedly ask for dilaudid as the only medication that could help her pain and states she wasn't going to take her home medication, liquid oxycodone anymore to the nursing staff.  She threw her floor RN out of the room and refused any further medications either pain or antibiotic unless she were given IV dilaudid.  She reported to me she had purulent drainage below her ostomy and is sure she has an abscess underneath it.  CT scan of the abdomen report reviewed with the patient and she threw her " "arms up in the air and said \"who knows then, I know my body.\"  I called her PCP, Dr Bazzi for her input as she has cared for the patient for years and she did express the patient has had significant mental decline in the past year and getting her to mental health has been very challenging and patient has also been verbally aggressive toward her staff and others.  Dr Bazzi requested a psychiatry evaluation which I supported however the patient did not want any intervention from [facility] and refused any further testing or care.  Patient is not in an ideal mental state but doesn't present a immediate threat to herself or others and legal hold is not appropriate at this time.  As she is refusing medical intervention, she would no longer benefit from hospitalization and thus will be discharge home to resume the home health she had prior to admission yesterday.       Therefore, she is discharged in good and stable condition to home with organized home healthcare and close outpatient follow-up.    The patient recovered much more quickly than anticipated on admission.  Patient refused medical care and thus is being discharge earlier than anticipated.    Discharge Date  12/5/18    FOLLOW UP ITEMS POST DISCHARGE  PCP - Dr Bazzi      DISCHARGE DIAGNOSES  Principal Problem:    Crohn's disease of small intestine with complication (HCC) POA: Yes      Overview: Followed by GI Dr. Cruz      S/p ileostomy      Scope 5/2014-no strictures, fistulas, or new abscesses  Active Problems:    Chronic pain POA: Yes      Overview: On multiple narcotics including high dose oxycodone and Dilaudid      Valium also on medication list    Ileostomy in place (HCC) POA: Yes    COPD (chronic obstructive pulmonary disease) ([facility]) POA: Yes      Overview: On oxygen at night    Paroxysmal atrial fibrillation (HCC) POA: Yes    History of DVT (deep vein thrombosis) POA: Yes    Chronic respiratory failure with hypoxia (HCC) POA: Yes  Resolved Problems:   "  * No resolved hospital problems. *      FOLLOW UP  Future Appointments  Date Time Provider Department Center   12/19/2018 11:00 AM Amanda Bazzi M.D. [facility] None     No follow-up provider specified.    MEDICATIONS ON DISCHARGE     Medication List      CONTINUE taking these medications      Instructions   aspirin EC 81 MG Tbec  Commonly known as:  ECOTRIN   Take 81 mg by mouth every day.  Dose:  81 mg     granisetron 1 MG Tabs  Commonly known as:  KYTRIL   Take 1 Tab by mouth every 12 hours as needed for Nausea/Vomiting.  Dose:  1 mg     Magnesium 100 MG Tabs   Take 100 mg by mouth every 48 hours.  Dose:  100 mg     metoprolol 25 MG Tabs  Commonly known as:  LOPRESSOR   Take 25 mg by mouth 2 times a day.  Dose:  25 mg     ondansetron 4 MG Tbdp  Commonly known as:  ZOFRAN ODT   Doctor's comments:  To replace all other rx's  Take 1 Tab by mouth every 6 hours as needed for Nausea.  Dose:  4 mg     oxyCODONE 20 MG/ML Conc   Take 30 mg by mouth every four hours as needed (pain). 20mg/ml solution  Dose:  30 mg     spironolactone 25 MG Tabs  Commonly known as:  ALDACTONE   Doctor's comments:  **Patient requests 90 days supply**  TAKE 2 TABLETS BY MOUTH TWICE DAILY            Allergies  Allergies   Allergen Reactions   • Morphine Swelling   • Ativan [Lorazepam]      States give me nightmares.    • Azathioprine Sodium Hives   • Demerol Vomiting   • Elavil [Amitriptyline]      Nightmares     • Fentanyl      Patches caused skin breakdown, no pain relief   • Kdc:Fentanyl    • Lyrica [Pregabalin]    • Methadone    • Methotrexate Hives and Rash   • Methotrexate Derivatives    • Pseudoephedrine Vomiting   • Remicade [Infliximab Injection] Hives   • Roxanol [Morphine Sulfate]      Throat swells   • Sulfa Drugs Hives   • Sulfasalazine    • Tape Rash     Skin peels off; paper tape   • Tizanidine Hydrochloride    • Carafate [Sucralfate] Rash     Generalizes/Mouth Sores   • Celestone [Betamethasone Sodium Phosphate]         DIET  Orders Placed This Encounter   Procedures   • Diet Order Clear Liquid     Standing Status:   Standing     Number of Occurrences:   1     Order Specific Question:   Diet:     Answer:   Clear Liquid [10]       ACTIVITY  As tolerated.  Weight bearing as tolerated    CONSULTATIONS  none    PROCEDURES  none    LABORATORY  Lab Results   Component Value Date    SODIUM 142 12/05/2018    POTASSIUM 3.4 (L) 12/05/2018    CHLORIDE 107 12/05/2018    CO2 21 12/05/2018    GLUCOSE 97 12/05/2018    BUN 21 12/05/2018    CREATININE 0.95 12/05/2018    CREATININE 0.89 02/10/2010    GLOMRATE >59 02/10/2010        Lab Results   Component Value Date    WBC 8.9 12/05/2018    WBC 7.9 02/10/2010    HEMOGLOBIN 13.0 12/05/2018    HEMATOCRIT 40.2 12/05/2018    PLATELETCT 203 12/05/2018        Total time of the discharge process exceeds 35 minutes.

## 2018-12-07 ENCOUNTER — HOME CARE VISIT (OUTPATIENT)
Dept: HOME HEALTH SERVICES | Facility: HOME HEALTHCARE | Age: 63
End: 2018-12-07
Payer: MEDICARE

## 2018-12-07 PROCEDURE — 665998 HH PPS REVENUE CREDIT

## 2018-12-07 PROCEDURE — G0493 RN CARE EA 15 MIN HH/HOSPICE: HCPCS

## 2018-12-07 PROCEDURE — 665999 HH PPS REVENUE DEBIT

## 2018-12-08 LAB
BACTERIA STL CULT: NORMAL
E COLI SXT1+2 STL IA: NORMAL
SIGNIFICANT IND 70042: NORMAL
SITE SITE: NORMAL
SOURCE SOURCE: NORMAL

## 2018-12-08 PROCEDURE — 665999 HH PPS REVENUE DEBIT

## 2018-12-08 PROCEDURE — 665998 HH PPS REVENUE CREDIT

## 2018-12-09 ENCOUNTER — HOME CARE VISIT (OUTPATIENT)
Dept: HOME HEALTH SERVICES | Facility: HOME HEALTHCARE | Age: 63
End: 2018-12-09
Payer: MEDICARE

## 2018-12-09 VITALS
DIASTOLIC BLOOD PRESSURE: 78 MMHG | HEIGHT: 72 IN | OXYGEN SATURATION: 98 % | HEART RATE: 137 BPM | BODY MASS INDEX: 21.97 KG/M2 | WEIGHT: 162.2 LBS | RESPIRATION RATE: 18 BRPM | TEMPERATURE: 99.1 F | SYSTOLIC BLOOD PRESSURE: 112 MMHG

## 2018-12-09 LAB
BACTERIA BLD CULT: NORMAL
BACTERIA BLD CULT: NORMAL
SIGNIFICANT IND 70042: NORMAL
SIGNIFICANT IND 70042: NORMAL
SITE SITE: NORMAL
SITE SITE: NORMAL
SOURCE SOURCE: NORMAL
SOURCE SOURCE: NORMAL

## 2018-12-09 PROCEDURE — 665998 HH PPS REVENUE CREDIT

## 2018-12-09 PROCEDURE — 665999 HH PPS REVENUE DEBIT

## 2018-12-09 SDOH — ECONOMIC STABILITY: HOUSING INSECURITY: UNSAFE COOKING RANGE AREA: 0

## 2018-12-09 SDOH — ECONOMIC STABILITY: HOUSING INSECURITY: UNSAFE APPLIANCES: 0

## 2018-12-09 ASSESSMENT — ENCOUNTER SYMPTOMS
VOMITING: DAILY
POOR JUDGMENT: 1
DEBILITATING PAIN: 1
SHORTNESS OF BREATH: T
MENTAL STATUS CHANGE: 0
NAUSEA: CONTINUOUS
SEVERE DYSPNEA: 1

## 2018-12-09 ASSESSMENT — ACTIVITIES OF DAILY LIVING (ADL): HOME_HEALTH_OASIS: 01

## 2018-12-10 ENCOUNTER — HOME CARE VISIT (OUTPATIENT)
Dept: HOME HEALTH SERVICES | Facility: HOME HEALTHCARE | Age: 63
End: 2018-12-10
Payer: MEDICARE

## 2018-12-10 ENCOUNTER — PATIENT OUTREACH (OUTPATIENT)
Dept: HEALTH INFORMATION MANAGEMENT | Facility: OTHER | Age: 63
End: 2018-12-10

## 2018-12-10 PROCEDURE — 665999 HH PPS REVENUE DEBIT

## 2018-12-10 PROCEDURE — 665998 HH PPS REVENUE CREDIT

## 2018-12-11 PROCEDURE — 665999 HH PPS REVENUE DEBIT

## 2018-12-11 PROCEDURE — 665998 HH PPS REVENUE CREDIT

## 2018-12-11 NOTE — ADDENDUM NOTE
Encounter addended by: Nicci Page on: 12/11/2018  3:58 PM<BR>    Actions taken: Charge Capture section accepted

## 2018-12-11 NOTE — PROGRESS NOTES
Referral from [facility]. Med review completed. No clinically significant medication related issues noted.

## 2018-12-12 ENCOUNTER — APPOINTMENT (OUTPATIENT)
Dept: RADIOLOGY | Facility: MEDICAL CENTER | Age: 63
DRG: 369 | End: 2018-12-12
Attending: EMERGENCY MEDICINE
Payer: MEDICARE

## 2018-12-12 ENCOUNTER — HOME CARE VISIT (OUTPATIENT)
Dept: HOME HEALTH SERVICES | Facility: HOME HEALTHCARE | Age: 63
End: 2018-12-12
Payer: MEDICARE

## 2018-12-12 ENCOUNTER — HOSPITAL ENCOUNTER (INPATIENT)
Facility: MEDICAL CENTER | Age: 63
LOS: 4 days | DRG: 369 | End: 2018-12-16
Attending: EMERGENCY MEDICINE | Admitting: HOSPITALIST
Payer: MEDICARE

## 2018-12-12 VITALS
DIASTOLIC BLOOD PRESSURE: 80 MMHG | RESPIRATION RATE: 16 BRPM | SYSTOLIC BLOOD PRESSURE: 105 MMHG | TEMPERATURE: 100 F | OXYGEN SATURATION: 97 % | HEART RATE: 103 BPM

## 2018-12-12 DIAGNOSIS — K50.019 CROHN'S DISEASE OF SMALL INTESTINE WITH COMPLICATION (HCC): ICD-10-CM

## 2018-12-12 DIAGNOSIS — E86.0 DEHYDRATION: ICD-10-CM

## 2018-12-12 DIAGNOSIS — R62.7 FTT (FAILURE TO THRIVE) IN ADULT: ICD-10-CM

## 2018-12-12 DIAGNOSIS — R10.30 LOWER ABDOMINAL PAIN: ICD-10-CM

## 2018-12-12 DIAGNOSIS — R11.2 NAUSEA AND VOMITING, INTRACTABILITY OF VOMITING NOT SPECIFIED, UNSPECIFIED VOMITING TYPE: ICD-10-CM

## 2018-12-12 PROBLEM — N17.9 ARF (ACUTE RENAL FAILURE) (HCC): Status: ACTIVE | Noted: 2018-12-12

## 2018-12-12 PROBLEM — E87.1 HYPONATREMIA: Status: ACTIVE | Noted: 2018-12-12

## 2018-12-12 PROBLEM — D72.829 LEUKOCYTOSIS: Status: ACTIVE | Noted: 2018-12-12

## 2018-12-12 PROBLEM — R74.8 LIVER ENZYME ELEVATION: Status: ACTIVE | Noted: 2018-12-12

## 2018-12-12 LAB
ALBUMIN SERPL BCP-MCNC: 4.4 G/DL (ref 3.2–4.9)
ALBUMIN/GLOB SERPL: 1.2 G/DL
ALP SERPL-CCNC: 131 U/L (ref 30–99)
ALT SERPL-CCNC: 56 U/L (ref 2–50)
ANION GAP SERPL CALC-SCNC: 13 MMOL/L (ref 0–11.9)
AST SERPL-CCNC: 59 U/L (ref 12–45)
BASOPHILS # BLD AUTO: 0.2 % (ref 0–1.8)
BASOPHILS # BLD: 0.05 K/UL (ref 0–0.12)
BILIRUB SERPL-MCNC: 1.6 MG/DL (ref 0.1–1.5)
BNP SERPL-MCNC: 478 PG/ML (ref 0–100)
BUN SERPL-MCNC: 25 MG/DL (ref 8–22)
CALCIUM SERPL-MCNC: 10.8 MG/DL (ref 8.4–10.2)
CHLORIDE SERPL-SCNC: 93 MMOL/L (ref 96–112)
CO2 SERPL-SCNC: 25 MMOL/L (ref 20–33)
CREAT SERPL-MCNC: 1.49 MG/DL (ref 0.5–1.4)
EOSINOPHIL # BLD AUTO: 0.01 K/UL (ref 0–0.51)
EOSINOPHIL NFR BLD: 0 % (ref 0–6.9)
ERYTHROCYTE [DISTWIDTH] IN BLOOD BY AUTOMATED COUNT: 38 FL (ref 35.9–50)
GLOBULIN SER CALC-MCNC: 3.6 G/DL (ref 1.9–3.5)
GLUCOSE SERPL-MCNC: 114 MG/DL (ref 65–99)
HCT VFR BLD AUTO: 55.2 % (ref 37–47)
HGB BLD-MCNC: 18.9 G/DL (ref 12–16)
IMM GRANULOCYTES # BLD AUTO: 0.14 K/UL (ref 0–0.11)
IMM GRANULOCYTES NFR BLD AUTO: 0.7 % (ref 0–0.9)
LIPASE SERPL-CCNC: 27 U/L (ref 7–58)
LYMPHOCYTES # BLD AUTO: 1.99 K/UL (ref 1–4.8)
LYMPHOCYTES NFR BLD: 9.8 % (ref 22–41)
MCH RBC QN AUTO: 29.5 PG (ref 27–33)
MCHC RBC AUTO-ENTMCNC: 34 G/DL (ref 33.6–35)
MCV RBC AUTO: 86.7 FL (ref 81.4–97.8)
MONOCYTES # BLD AUTO: 1.12 K/UL (ref 0–0.85)
MONOCYTES NFR BLD AUTO: 5.5 % (ref 0–13.4)
NEUTROPHILS # BLD AUTO: 17 K/UL (ref 2–7.15)
NEUTROPHILS NFR BLD: 83.8 % (ref 44–72)
NRBC # BLD AUTO: 0 K/UL
NRBC BLD-RTO: 0 /100 WBC
PLATELET # BLD AUTO: 248 K/UL (ref 164–446)
PMV BLD AUTO: 11.7 FL (ref 9–12.9)
POTASSIUM SERPL-SCNC: 3.7 MMOL/L (ref 3.6–5.5)
PROT SERPL-MCNC: 8 G/DL (ref 6–8.2)
RBC # BLD AUTO: 6.38 M/UL (ref 4.2–5.4)
SODIUM SERPL-SCNC: 131 MMOL/L (ref 135–145)
TROPONIN I SERPL-MCNC: 0.03 NG/ML (ref 0–0.04)
WBC # BLD AUTO: 20.3 K/UL (ref 4.8–10.8)

## 2018-12-12 PROCEDURE — 700111 HCHG RX REV CODE 636 W/ 250 OVERRIDE (IP): Performed by: EMERGENCY MEDICINE

## 2018-12-12 PROCEDURE — 74177 CT ABD & PELVIS W/CONTRAST: CPT

## 2018-12-12 PROCEDURE — 700117 HCHG RX CONTRAST REV CODE 255: Performed by: EMERGENCY MEDICINE

## 2018-12-12 PROCEDURE — 770020 HCHG ROOM/CARE - TELE (206)

## 2018-12-12 PROCEDURE — 96361 HYDRATE IV INFUSION ADD-ON: CPT

## 2018-12-12 PROCEDURE — 96376 TX/PRO/DX INJ SAME DRUG ADON: CPT

## 2018-12-12 PROCEDURE — 71045 X-RAY EXAM CHEST 1 VIEW: CPT

## 2018-12-12 PROCEDURE — 700111 HCHG RX REV CODE 636 W/ 250 OVERRIDE (IP): Performed by: HOSPITALIST

## 2018-12-12 PROCEDURE — 83880 ASSAY OF NATRIURETIC PEPTIDE: CPT

## 2018-12-12 PROCEDURE — 36415 COLL VENOUS BLD VENIPUNCTURE: CPT

## 2018-12-12 PROCEDURE — 99285 EMERGENCY DEPT VISIT HI MDM: CPT

## 2018-12-12 PROCEDURE — 96375 TX/PRO/DX INJ NEW DRUG ADDON: CPT

## 2018-12-12 PROCEDURE — 665998 HH PPS REVENUE CREDIT

## 2018-12-12 PROCEDURE — 99223 1ST HOSP IP/OBS HIGH 75: CPT | Mod: AI | Performed by: HOSPITALIST

## 2018-12-12 PROCEDURE — 85025 COMPLETE CBC W/AUTO DIFF WBC: CPT

## 2018-12-12 PROCEDURE — 700101 HCHG RX REV CODE 250: Performed by: HOSPITALIST

## 2018-12-12 PROCEDURE — 84484 ASSAY OF TROPONIN QUANT: CPT

## 2018-12-12 PROCEDURE — G0495 RN CARE TRAIN/EDU IN HH: HCPCS

## 2018-12-12 PROCEDURE — 80053 COMPREHEN METABOLIC PANEL: CPT

## 2018-12-12 PROCEDURE — 700105 HCHG RX REV CODE 258: Performed by: EMERGENCY MEDICINE

## 2018-12-12 PROCEDURE — 665999 HH PPS REVENUE DEBIT

## 2018-12-12 PROCEDURE — 83690 ASSAY OF LIPASE: CPT

## 2018-12-12 PROCEDURE — 96374 THER/PROPH/DIAG INJ IV PUSH: CPT

## 2018-12-12 PROCEDURE — G0180 MD CERTIFICATION HHA PATIENT: HCPCS | Performed by: FAMILY MEDICINE

## 2018-12-12 RX ORDER — ONDANSETRON 2 MG/ML
4 INJECTION INTRAMUSCULAR; INTRAVENOUS ONCE
Status: COMPLETED | OUTPATIENT
Start: 2018-12-12 | End: 2018-12-12

## 2018-12-12 RX ORDER — CETIRIZINE HYDROCHLORIDE 10 MG/1
10 TABLET ORAL
Status: DISCONTINUED | OUTPATIENT
Start: 2018-12-12 | End: 2018-12-16 | Stop reason: HOSPADM

## 2018-12-12 RX ORDER — GRANISETRON HYDROCHLORIDE 1 MG/1
1 TABLET, FILM COATED ORAL EVERY 12 HOURS PRN
Status: DISCONTINUED | OUTPATIENT
Start: 2018-12-12 | End: 2018-12-12

## 2018-12-12 RX ORDER — OXYCODONE HCL 20 MG/ML
30 CONCENTRATE, ORAL ORAL EVERY 4 HOURS PRN
Status: DISCONTINUED | OUTPATIENT
Start: 2018-12-12 | End: 2018-12-16 | Stop reason: HOSPADM

## 2018-12-12 RX ORDER — BISACODYL 10 MG
10 SUPPOSITORY, RECTAL RECTAL
Status: DISCONTINUED | OUTPATIENT
Start: 2018-12-12 | End: 2018-12-16 | Stop reason: HOSPADM

## 2018-12-12 RX ORDER — SODIUM CHLORIDE AND POTASSIUM CHLORIDE 150; 900 MG/100ML; MG/100ML
INJECTION, SOLUTION INTRAVENOUS CONTINUOUS
Status: DISCONTINUED | OUTPATIENT
Start: 2018-12-12 | End: 2018-12-14

## 2018-12-12 RX ORDER — HYDROMORPHONE HYDROCHLORIDE 1 MG/ML
0.5 INJECTION, SOLUTION INTRAMUSCULAR; INTRAVENOUS; SUBCUTANEOUS ONCE
Status: COMPLETED | OUTPATIENT
Start: 2018-12-12 | End: 2018-12-12

## 2018-12-12 RX ORDER — HYDROMORPHONE HYDROCHLORIDE 1 MG/ML
0.5 INJECTION, SOLUTION INTRAMUSCULAR; INTRAVENOUS; SUBCUTANEOUS EVERY 4 HOURS PRN
Status: DISCONTINUED | OUTPATIENT
Start: 2018-12-12 | End: 2018-12-13

## 2018-12-12 RX ORDER — ESTRADIOL 0.1 MG/G
0.5 CREAM VAGINAL
Status: DISCONTINUED | OUTPATIENT
Start: 2018-12-12 | End: 2018-12-12

## 2018-12-12 RX ORDER — HYDRALAZINE HYDROCHLORIDE 20 MG/ML
10 INJECTION INTRAMUSCULAR; INTRAVENOUS EVERY 4 HOURS PRN
Status: DISCONTINUED | OUTPATIENT
Start: 2018-12-12 | End: 2018-12-16 | Stop reason: HOSPADM

## 2018-12-12 RX ORDER — ONDANSETRON 2 MG/ML
4 INJECTION INTRAMUSCULAR; INTRAVENOUS EVERY 4 HOURS PRN
Status: DISCONTINUED | OUTPATIENT
Start: 2018-12-12 | End: 2018-12-16 | Stop reason: HOSPADM

## 2018-12-12 RX ORDER — POLYETHYLENE GLYCOL 3350 17 G/17G
1 POWDER, FOR SOLUTION ORAL
Status: DISCONTINUED | OUTPATIENT
Start: 2018-12-12 | End: 2018-12-16 | Stop reason: HOSPADM

## 2018-12-12 RX ORDER — AMOXICILLIN 250 MG
2 CAPSULE ORAL 2 TIMES DAILY
Status: DISCONTINUED | OUTPATIENT
Start: 2018-12-12 | End: 2018-12-16 | Stop reason: HOSPADM

## 2018-12-12 RX ORDER — ONDANSETRON 2 MG/ML
INJECTION INTRAMUSCULAR; INTRAVENOUS
Status: DISPENSED
Start: 2018-12-12 | End: 2018-12-13

## 2018-12-12 RX ORDER — AZELASTINE HYDROCHLORIDE 137 UG/1
1 SPRAY, METERED NASAL 2 TIMES DAILY PRN
Status: DISCONTINUED | OUTPATIENT
Start: 2018-12-12 | End: 2018-12-12

## 2018-12-12 RX ORDER — ACETAMINOPHEN 325 MG/1
650 TABLET ORAL EVERY 6 HOURS PRN
Status: DISCONTINUED | OUTPATIENT
Start: 2018-12-12 | End: 2018-12-16 | Stop reason: HOSPADM

## 2018-12-12 RX ORDER — SODIUM CHLORIDE 9 MG/ML
INJECTION, SOLUTION INTRAVENOUS CONTINUOUS
Status: DISCONTINUED | OUTPATIENT
Start: 2018-12-12 | End: 2018-12-13

## 2018-12-12 RX ORDER — ASPIRIN 81 MG/1
81 TABLET, CHEWABLE ORAL DAILY
Status: DISCONTINUED | OUTPATIENT
Start: 2018-12-13 | End: 2018-12-16 | Stop reason: HOSPADM

## 2018-12-12 RX ORDER — HEPARIN SODIUM 5000 [USP'U]/ML
5000 INJECTION, SOLUTION INTRAVENOUS; SUBCUTANEOUS EVERY 8 HOURS
Status: DISCONTINUED | OUTPATIENT
Start: 2018-12-12 | End: 2018-12-15

## 2018-12-12 RX ORDER — ONDANSETRON 4 MG/1
4 TABLET, ORALLY DISINTEGRATING ORAL EVERY 4 HOURS PRN
Status: DISCONTINUED | OUTPATIENT
Start: 2018-12-12 | End: 2018-12-16 | Stop reason: HOSPADM

## 2018-12-12 RX ADMIN — IOHEXOL 100 ML: 350 INJECTION, SOLUTION INTRAVENOUS at 18:21

## 2018-12-12 RX ADMIN — HYDROMORPHONE HYDROCHLORIDE 0.5 MG: 1 INJECTION, SOLUTION INTRAMUSCULAR; INTRAVENOUS; SUBCUTANEOUS at 15:21

## 2018-12-12 RX ADMIN — SODIUM CHLORIDE: 900 INJECTION, SOLUTION INTRAVENOUS at 15:00

## 2018-12-12 RX ADMIN — ONDANSETRON 4 MG: 2 INJECTION INTRAMUSCULAR; INTRAVENOUS at 15:21

## 2018-12-12 RX ADMIN — HYDROMORPHONE HYDROCHLORIDE 0.5 MG: 1 INJECTION, SOLUTION INTRAMUSCULAR; INTRAVENOUS; SUBCUTANEOUS at 21:33

## 2018-12-12 RX ADMIN — HYDROMORPHONE HYDROCHLORIDE 0.5 MG: 1 INJECTION, SOLUTION INTRAMUSCULAR; INTRAVENOUS; SUBCUTANEOUS at 17:28

## 2018-12-12 RX ADMIN — ONDANSETRON 4 MG: 2 INJECTION INTRAMUSCULAR; INTRAVENOUS at 16:44

## 2018-12-12 RX ADMIN — ONDANSETRON 4 MG: 2 INJECTION INTRAMUSCULAR; INTRAVENOUS at 19:47

## 2018-12-12 RX ADMIN — POTASSIUM CHLORIDE AND SODIUM CHLORIDE: 900; 150 INJECTION, SOLUTION INTRAVENOUS at 21:34

## 2018-12-12 RX ADMIN — HEPARIN SODIUM 5000 UNITS: 5000 INJECTION, SOLUTION INTRAVENOUS; SUBCUTANEOUS at 21:33

## 2018-12-12 ASSESSMENT — ENCOUNTER SYMPTOMS
BLURRED VISION: 0
SORE THROAT: 0
BACK PAIN: 0
DIZZINESS: 0
HEADACHES: 0
FEVER: 0
WEAKNESS: 1
PALPITATIONS: 0
TINGLING: 0
ABDOMINAL PAIN: 1
SHORTNESS OF BREATH: 0
VOMITING: 1
INSOMNIA: 0
EYE PAIN: 0
WEIGHT LOSS: 1
DEPRESSION: 0
COUGH: 0
CHILLS: 0
NECK PAIN: 0
NAUSEA: CONSTANT
NAUSEA: 1

## 2018-12-12 ASSESSMENT — LIFESTYLE VARIABLES: EVER_SMOKED: NEVER

## 2018-12-13 ENCOUNTER — HOME CARE VISIT (OUTPATIENT)
Dept: HOME HEALTH SERVICES | Facility: HOME HEALTHCARE | Age: 63
End: 2018-12-13
Payer: MEDICARE

## 2018-12-13 PROBLEM — B37.81 THRUSH OF MOUTH AND ESOPHAGUS (HCC): Status: ACTIVE | Noted: 2018-12-13

## 2018-12-13 PROBLEM — E87.6 HYPOKALEMIA: Status: ACTIVE | Noted: 2018-12-13

## 2018-12-13 PROBLEM — B37.0 THRUSH OF MOUTH AND ESOPHAGUS (HCC): Status: ACTIVE | Noted: 2018-12-13

## 2018-12-13 LAB
ALBUMIN SERPL BCP-MCNC: 3.2 G/DL (ref 3.2–4.9)
ALBUMIN/GLOB SERPL: 0.9 G/DL
ALP SERPL-CCNC: 95 U/L (ref 30–99)
ALT SERPL-CCNC: 40 U/L (ref 2–50)
ANION GAP SERPL CALC-SCNC: 9 MMOL/L (ref 0–11.9)
AST SERPL-CCNC: 37 U/L (ref 12–45)
BASOPHILS # BLD AUTO: 0.2 % (ref 0–1.8)
BASOPHILS # BLD: 0.03 K/UL (ref 0–0.12)
BILIRUB SERPL-MCNC: 1.1 MG/DL (ref 0.1–1.5)
BUN SERPL-MCNC: 21 MG/DL (ref 8–22)
CALCIUM SERPL-MCNC: 9.5 MG/DL (ref 8.4–10.2)
CHLORIDE SERPL-SCNC: 101 MMOL/L (ref 96–112)
CO2 SERPL-SCNC: 26 MMOL/L (ref 20–33)
CREAT SERPL-MCNC: 1.19 MG/DL (ref 0.5–1.4)
CRP SERPL HS-MCNC: 2.53 MG/DL (ref 0–0.75)
EOSINOPHIL # BLD AUTO: 0.04 K/UL (ref 0–0.51)
EOSINOPHIL NFR BLD: 0.2 % (ref 0–6.9)
ERYTHROCYTE [DISTWIDTH] IN BLOOD BY AUTOMATED COUNT: 39.8 FL (ref 35.9–50)
ERYTHROCYTE [SEDIMENTATION RATE] IN BLOOD BY WESTERGREN METHOD: 18 MM/HOUR (ref 0–30)
GLOBULIN SER CALC-MCNC: 3.4 G/DL (ref 1.9–3.5)
GLUCOSE SERPL-MCNC: 105 MG/DL (ref 65–99)
HCT VFR BLD AUTO: 43.7 % (ref 37–47)
HGB BLD-MCNC: 14.6 G/DL (ref 12–16)
IMM GRANULOCYTES # BLD AUTO: 0.1 K/UL (ref 0–0.11)
IMM GRANULOCYTES NFR BLD AUTO: 0.5 % (ref 0–0.9)
LYMPHOCYTES # BLD AUTO: 1.06 K/UL (ref 1–4.8)
LYMPHOCYTES NFR BLD: 5.4 % (ref 22–41)
MCH RBC QN AUTO: 29.6 PG (ref 27–33)
MCHC RBC AUTO-ENTMCNC: 33.4 G/DL (ref 33.6–35)
MCV RBC AUTO: 88.6 FL (ref 81.4–97.8)
MONOCYTES # BLD AUTO: 1.26 K/UL (ref 0–0.85)
MONOCYTES NFR BLD AUTO: 6.4 % (ref 0–13.4)
NEUTROPHILS # BLD AUTO: 17.09 K/UL (ref 2–7.15)
NEUTROPHILS NFR BLD: 87.3 % (ref 44–72)
NRBC # BLD AUTO: 0 K/UL
NRBC BLD-RTO: 0 /100 WBC
PLATELET # BLD AUTO: 196 K/UL (ref 164–446)
PMV BLD AUTO: 11.8 FL (ref 9–12.9)
POTASSIUM SERPL-SCNC: 3 MMOL/L (ref 3.6–5.5)
PROT SERPL-MCNC: 6.6 G/DL (ref 6–8.2)
RBC # BLD AUTO: 4.93 M/UL (ref 4.2–5.4)
SODIUM SERPL-SCNC: 136 MMOL/L (ref 135–145)
WBC # BLD AUTO: 19.6 K/UL (ref 4.8–10.8)

## 2018-12-13 PROCEDURE — 99233 SBSQ HOSP IP/OBS HIGH 50: CPT | Performed by: HOSPITALIST

## 2018-12-13 PROCEDURE — 700111 HCHG RX REV CODE 636 W/ 250 OVERRIDE (IP): Performed by: HOSPITALIST

## 2018-12-13 PROCEDURE — 97162 PT EVAL MOD COMPLEX 30 MIN: CPT

## 2018-12-13 PROCEDURE — G8980 MOBILITY D/C STATUS: HCPCS | Mod: CK

## 2018-12-13 PROCEDURE — 700105 HCHG RX REV CODE 258: Performed by: EMERGENCY MEDICINE

## 2018-12-13 PROCEDURE — 700102 HCHG RX REV CODE 250 W/ 637 OVERRIDE(OP): Performed by: HOSPITALIST

## 2018-12-13 PROCEDURE — A9270 NON-COVERED ITEM OR SERVICE: HCPCS | Performed by: HOSPITALIST

## 2018-12-13 PROCEDURE — G8979 MOBILITY GOAL STATUS: HCPCS | Mod: CK

## 2018-12-13 PROCEDURE — 770020 HCHG ROOM/CARE - TELE (206)

## 2018-12-13 PROCEDURE — G8978 MOBILITY CURRENT STATUS: HCPCS | Mod: CK

## 2018-12-13 PROCEDURE — 700111 HCHG RX REV CODE 636 W/ 250 OVERRIDE (IP): Performed by: FAMILY MEDICINE

## 2018-12-13 PROCEDURE — G8987 SELF CARE CURRENT STATUS: HCPCS | Mod: CL

## 2018-12-13 PROCEDURE — 85652 RBC SED RATE AUTOMATED: CPT

## 2018-12-13 PROCEDURE — 97166 OT EVAL MOD COMPLEX 45 MIN: CPT

## 2018-12-13 PROCEDURE — 700101 HCHG RX REV CODE 250: Performed by: HOSPITALIST

## 2018-12-13 PROCEDURE — 665998 HH PPS REVENUE CREDIT

## 2018-12-13 PROCEDURE — G8996 SWALLOW CURRENT STATUS: HCPCS | Mod: CK

## 2018-12-13 PROCEDURE — 86140 C-REACTIVE PROTEIN: CPT

## 2018-12-13 PROCEDURE — G8988 SELF CARE GOAL STATUS: HCPCS | Mod: CL

## 2018-12-13 PROCEDURE — 92610 EVALUATE SWALLOWING FUNCTION: CPT

## 2018-12-13 PROCEDURE — 80053 COMPREHEN METABOLIC PANEL: CPT

## 2018-12-13 PROCEDURE — G8989 SELF CARE D/C STATUS: HCPCS | Mod: CL

## 2018-12-13 PROCEDURE — 85025 COMPLETE CBC W/AUTO DIFF WBC: CPT

## 2018-12-13 PROCEDURE — G8997 SWALLOW GOAL STATUS: HCPCS | Mod: CI

## 2018-12-13 PROCEDURE — 36415 COLL VENOUS BLD VENIPUNCTURE: CPT

## 2018-12-13 PROCEDURE — 94664 DEMO&/EVAL PT USE INHALER: CPT

## 2018-12-13 PROCEDURE — 665999 HH PPS REVENUE DEBIT

## 2018-12-13 RX ORDER — HYDROMORPHONE HYDROCHLORIDE 1 MG/ML
0.5 INJECTION, SOLUTION INTRAMUSCULAR; INTRAVENOUS; SUBCUTANEOUS
Status: DISCONTINUED | OUTPATIENT
Start: 2018-12-13 | End: 2018-12-14

## 2018-12-13 RX ORDER — METOCLOPRAMIDE HYDROCHLORIDE 5 MG/ML
10 INJECTION INTRAMUSCULAR; INTRAVENOUS EVERY 6 HOURS PRN
Status: DISCONTINUED | OUTPATIENT
Start: 2018-12-13 | End: 2018-12-16 | Stop reason: HOSPADM

## 2018-12-13 RX ORDER — METOCLOPRAMIDE HYDROCHLORIDE 5 MG/ML
10 INJECTION INTRAMUSCULAR; INTRAVENOUS EVERY 6 HOURS
Status: DISCONTINUED | OUTPATIENT
Start: 2018-12-13 | End: 2018-12-13

## 2018-12-13 RX ORDER — FLUCONAZOLE 2 MG/ML
200 INJECTION, SOLUTION INTRAVENOUS EVERY 24 HOURS
Status: DISCONTINUED | OUTPATIENT
Start: 2018-12-13 | End: 2018-12-16 | Stop reason: HOSPADM

## 2018-12-13 RX ORDER — POTASSIUM CHLORIDE 7.45 MG/ML
10 INJECTION INTRAVENOUS
Status: COMPLETED | OUTPATIENT
Start: 2018-12-13 | End: 2018-12-13

## 2018-12-13 RX ADMIN — HYDROMORPHONE HYDROCHLORIDE 0.5 MG: 1 INJECTION, SOLUTION INTRAMUSCULAR; INTRAVENOUS; SUBCUTANEOUS at 23:54

## 2018-12-13 RX ADMIN — HEPARIN SODIUM 5000 UNITS: 5000 INJECTION, SOLUTION INTRAVENOUS; SUBCUTANEOUS at 05:52

## 2018-12-13 RX ADMIN — POTASSIUM CHLORIDE 10 MEQ: 7.46 INJECTION, SOLUTION INTRAVENOUS at 13:27

## 2018-12-13 RX ADMIN — HYDROMORPHONE HYDROCHLORIDE 0.5 MG: 1 INJECTION, SOLUTION INTRAMUSCULAR; INTRAVENOUS; SUBCUTANEOUS at 01:44

## 2018-12-13 RX ADMIN — POTASSIUM CHLORIDE AND SODIUM CHLORIDE: 900; 150 INJECTION, SOLUTION INTRAVENOUS at 15:10

## 2018-12-13 RX ADMIN — ONDANSETRON 4 MG: 2 INJECTION INTRAMUSCULAR; INTRAVENOUS at 23:52

## 2018-12-13 RX ADMIN — HYDROMORPHONE HYDROCHLORIDE 0.5 MG: 1 INJECTION, SOLUTION INTRAMUSCULAR; INTRAVENOUS; SUBCUTANEOUS at 17:53

## 2018-12-13 RX ADMIN — SODIUM CHLORIDE: 900 INJECTION, SOLUTION INTRAVENOUS at 07:00

## 2018-12-13 RX ADMIN — HYDROMORPHONE HYDROCHLORIDE 0.5 MG: 1 INJECTION, SOLUTION INTRAMUSCULAR; INTRAVENOUS; SUBCUTANEOUS at 13:25

## 2018-12-13 RX ADMIN — SODIUM CHLORIDE: 900 INJECTION, SOLUTION INTRAVENOUS at 15:10

## 2018-12-13 RX ADMIN — POTASSIUM CHLORIDE 10 MEQ: 7.46 INJECTION, SOLUTION INTRAVENOUS at 17:49

## 2018-12-13 RX ADMIN — POTASSIUM CHLORIDE AND SODIUM CHLORIDE: 900; 150 INJECTION, SOLUTION INTRAVENOUS at 05:58

## 2018-12-13 RX ADMIN — METOCLOPRAMIDE 10 MG: 5 INJECTION, SOLUTION INTRAMUSCULAR; INTRAVENOUS at 00:29

## 2018-12-13 RX ADMIN — ONDANSETRON 4 MG: 2 INJECTION INTRAMUSCULAR; INTRAVENOUS at 17:53

## 2018-12-13 RX ADMIN — HYDROMORPHONE HYDROCHLORIDE 0.5 MG: 1 INJECTION, SOLUTION INTRAMUSCULAR; INTRAVENOUS; SUBCUTANEOUS at 05:49

## 2018-12-13 RX ADMIN — FLUCONAZOLE 200 MG: 2 INJECTION, SOLUTION INTRAVENOUS at 13:26

## 2018-12-13 RX ADMIN — POTASSIUM CHLORIDE 10 MEQ: 7.46 INJECTION, SOLUTION INTRAVENOUS at 16:23

## 2018-12-13 RX ADMIN — ONDANSETRON 4 MG: 2 INJECTION INTRAMUSCULAR; INTRAVENOUS at 08:50

## 2018-12-13 RX ADMIN — HYDROMORPHONE HYDROCHLORIDE 0.5 MG: 1 INJECTION, SOLUTION INTRAMUSCULAR; INTRAVENOUS; SUBCUTANEOUS at 21:07

## 2018-12-13 RX ADMIN — ONDANSETRON 4 MG: 2 INJECTION INTRAMUSCULAR; INTRAVENOUS at 04:15

## 2018-12-13 RX ADMIN — ONDANSETRON 4 MG: 2 INJECTION INTRAMUSCULAR; INTRAVENOUS at 13:26

## 2018-12-13 RX ADMIN — HYDROMORPHONE HYDROCHLORIDE 0.5 MG: 1 INJECTION, SOLUTION INTRAMUSCULAR; INTRAVENOUS; SUBCUTANEOUS at 09:40

## 2018-12-13 RX ADMIN — HEPARIN SODIUM 5000 UNITS: 5000 INJECTION, SOLUTION INTRAVENOUS; SUBCUTANEOUS at 21:06

## 2018-12-13 RX ADMIN — POTASSIUM CHLORIDE 10 MEQ: 7.46 INJECTION, SOLUTION INTRAVENOUS at 15:06

## 2018-12-13 RX ADMIN — HEPARIN SODIUM 5000 UNITS: 5000 INJECTION, SOLUTION INTRAVENOUS; SUBCUTANEOUS at 13:26

## 2018-12-13 ASSESSMENT — ENCOUNTER SYMPTOMS
WEIGHT LOSS: 0
VOMITING: 1
RESPIRATORY NEGATIVE: 1
LOSS OF CONSCIOUSNESS: 0
NAUSEA: 0
NERVOUS/ANXIOUS: 0
FEVER: 0
WEAKNESS: 0
COUGH: 0
DEPRESSION: 0
CONSTIPATION: 0
CARDIOVASCULAR NEGATIVE: 1
DIARRHEA: 0
NEUROLOGICAL NEGATIVE: 1
BRUISES/BLEEDS EASILY: 0
SHORTNESS OF BREATH: 0
CHILLS: 0
FALLS: 0
PSYCHIATRIC NEGATIVE: 1
FLANK PAIN: 0
MYALGIAS: 1
BACK PAIN: 1
CONSTITUTIONAL NEGATIVE: 1
ABDOMINAL PAIN: 0
DIZZINESS: 0

## 2018-12-13 ASSESSMENT — COGNITIVE AND FUNCTIONAL STATUS - GENERAL
DAILY ACTIVITIY SCORE: 13
DRESSING REGULAR LOWER BODY CLOTHING: TOTAL
HELP NEEDED FOR BATHING: A LITTLE
SUGGESTED CMS G CODE MODIFIER MOBILITY: CK
SUGGESTED CMS G CODE MODIFIER MOBILITY: CK
DRESSING REGULAR UPPER BODY CLOTHING: A LOT
SUGGESTED CMS G CODE MODIFIER DAILY ACTIVITY: CL
CLIMB 3 TO 5 STEPS WITH RAILING: A LITTLE
MOVING FROM LYING ON BACK TO SITTING ON SIDE OF FLAT BED: A LITTLE
MOBILITY SCORE: 18
WALKING IN HOSPITAL ROOM: A LITTLE
PERSONAL GROOMING: A LITTLE
TOILETING: A LOT
TURNING FROM BACK TO SIDE WHILE IN FLAT BAD: A LITTLE
MOBILITY SCORE: 18
MOVING TO AND FROM BED TO CHAIR: A LITTLE
TOILETING: A LITTLE
SUGGESTED CMS G CODE MODIFIER DAILY ACTIVITY: CK
STANDING UP FROM CHAIR USING ARMS: A LITTLE
MOVING FROM LYING ON BACK TO SITTING ON SIDE OF FLAT BED: A LITTLE
EATING MEALS: A LITTLE
EATING MEALS: A LITTLE
PERSONAL GROOMING: A LITTLE
WALKING IN HOSPITAL ROOM: A LITTLE
CLIMB 3 TO 5 STEPS WITH RAILING: A LITTLE
HELP NEEDED FOR BATHING: A LOT
MOVING TO AND FROM BED TO CHAIR: A LITTLE
DRESSING REGULAR LOWER BODY CLOTHING: A LITTLE
STANDING UP FROM CHAIR USING ARMS: A LITTLE
TURNING FROM BACK TO SIDE WHILE IN FLAT BAD: A LITTLE
DAILY ACTIVITIY SCORE: 18
DRESSING REGULAR UPPER BODY CLOTHING: A LITTLE

## 2018-12-13 ASSESSMENT — GAIT ASSESSMENTS
ASSISTIVE DEVICE: FRONT WHEEL WALKER
GAIT LEVEL OF ASSIST: STAND BY ASSIST
DISTANCE (FEET): 5
DEVIATION: OTHER (COMMENT)

## 2018-12-13 ASSESSMENT — PAIN SCALES - GENERAL
PAINLEVEL_OUTOF10: 10
PAINLEVEL_OUTOF10: 5
PAINLEVEL_OUTOF10: 10
PAINLEVEL_OUTOF10: 7
PAINLEVEL_OUTOF10: 10
PAINLEVEL_OUTOF10: 3
PAINLEVEL_OUTOF10: 2

## 2018-12-13 ASSESSMENT — PATIENT HEALTH QUESTIONNAIRE - PHQ9
1. LITTLE INTEREST OR PLEASURE IN DOING THINGS: NOT AT ALL
SUM OF ALL RESPONSES TO PHQ9 QUESTIONS 1 AND 2: 0

## 2018-12-13 ASSESSMENT — ACTIVITIES OF DAILY LIVING (ADL): TOILETING: REQUIRES ASSIST

## 2018-12-13 NOTE — THERAPY
"Physical Therapy Evaluation completed.   Bed Mobility:  Supine to Sit: Stand by Assist  Transfers: Sit to Stand: Contact Guard Assist  Gait: Level Of Assist: Stand by Assist with Front-Wheel Walker       Plan of Care: Patient with no further skilled PT needs in the acute care setting at this time  Discharge Recommendations: Equipment: Unable to determine as Pt unclear as to what equipment she actually owns vs borrows. Post-acute therapy Discharge to home with outpatient or home health for additional skilled therapy services.    See \"Rehab Therapy-Acute\" Patient Summary Report for complete documentation.       Pt is a 64 y/o female with a very complicated PHM. Pt presented to acute setting with c/o nausea and vomiting with inability to keep anything down. During therapy evaluation, Pt reported pain in several parts of her body and reported she has neuropathy, lymphedema and a previously ruptured achilles tendon. She stated she was unable to use her RUE due to IV lines and receiving a Zofran shot IM by ambulance service prior to her admission. Pt was able to perform bed mobility both with and without railings, and was able to get BLEs into and OOB without assistance demonstrating good core strength and function. Pt was able to ambulate within room at bedside but declined further distances as she felt her achilles would rupture with continued mobility. Pt was able to manage FWW without assistance but required constant verbal cuing to continue working with therapy and complete functional tasks. Pt does not demonstrate need for skilled acute PT services as she is physically and functionally capable of performing mobility without assistance. Pt demonstrates self limiting behavior which is her biggest limiting factor in mobilization. Pt to continue mobilizing with [facility] staff in acute setting and resume home health once medically able to DC.   "

## 2018-12-13 NOTE — PROGRESS NOTES
Pt unable to swallow liquid and unable to dorsi and plantar flex. Pt also states she's very nauseated and is asking for Reglan.  Hospitalist paged. Awaititng return page from Dr. Newell.

## 2018-12-13 NOTE — ASSESSMENT & PLAN NOTE
In nsr. Trend on tele.     Continue BB as tolerated.   Not on anticoagulation likely 2/2 gi bleeding in past.

## 2018-12-13 NOTE — PROGRESS NOTES
Tele strip at 2104 shows ST w/ HR of 101    DE 0.16  QRS 0.10  QT 0.34    Telemetry Summary    Rhythm NSR, ST  Rate 80s - 115  Ectopy Frequent PVCs, bigeminy - per [facility]    Telemetry monitoring strips faxed to primary RN. Tele strip summary only. Further assessment and monitoring to be done by primary RN.

## 2018-12-13 NOTE — ED NOTES
Pt drinking po contrast, c/o nausea.  Pt medicated as directed by ER md, poc update given to pt.

## 2018-12-13 NOTE — PROGRESS NOTES
Dr. Newell returned page. Dr ordered PT for her leg issues. Pt ordered Reglan 10 mg Q6 hours PRN for nausea. Dr also ordered pt to be NPO for speech therapy.

## 2018-12-13 NOTE — DISCHARGE PLANNING
Patient is currently on service with [facility]. Please write home care orders upon discharge to home for continuum of care.[facility] at  /[phone] if there are any questions.

## 2018-12-13 NOTE — ASSESSMENT & PLAN NOTE
IV Dilaudid every 3 hours as needed for severe pain.  Treat thrush and resume home oral pain medications when able.  12/14: decrease frequency of iv dilaudid to every 4 hours and give with zofran, improving flare  12/15: improving, no change in pain medication today, anticipate discharge tomorrow.

## 2018-12-13 NOTE — ED NOTES
"The pt arrived to the floor and told staff that she was unable to help transfer.  The pt request the staff do a total lift.  \" I can not walk and I can not stand to transfer, I am too weak to stand to transfer or go to the br and I need you to move me and put that pan under me to pee \". The pt was asst to the bed and a urinal was provided. Care tx to inpt staff.   "

## 2018-12-13 NOTE — CONSULTS
12/13  ATSP by Dr Fulton for Ileostomy Dysfunction    HPI: 63y F with hx of Crohn's disease, multiple abdominal surgeries and a LLQ end ileostomy.  She has had this for years and last surgery was with Dr Cruz in 2009 when he completed a complex ileostomy revision, resection and lysis of adhesions and again with a more superificial revision in 2014.  She has been having trouble with function of her stoma.  It is sitting in a nicely brooked position, but she feels like the opening is narrowed and she is having a hard time managing this an appliances.  She was admitted to the medical service with dehydration and multiple electrolyte abnormalities but had no evidence of obstruction.      PMHx: Crohns, Anxiety, Bronchitis, Ileostomy, Cardiac Murmur, Hypokalemia; GERD, Multiple Falls, Narcotic Dependence, bowel obstructions, Pneumonia, Neuralgia, Rosacea, Sleep Apnea, CAD, Liver Disease, Seizure disorder    PSHx: Multiple orthopedic procedures, Ileostomy with revisions, total abdominal colectomy, mammoplasty and reduction    Meds: see Med Rec, no anticoagulation    All: Azathioprine, Carafate, Infliximab, Moprhine, Roxanol, Amitriptyline, Demorol, Fentanyl, Lorazepam, Methadone, Methotrexate, Pregabalin, Pseudoephedrine, Sulfa, Tape, Celestone, Sulfasalazine, Tizanidine    FamHx: no colon/rectal cancers, no other pertinent family history    SocHx: No tobacco, uses occasional drugs and EtOH      ROS: negative except as above    Consitutional- no fevers/chills, no weight loss  HEENT- no visual changes, no sneezing or runny nose  Skin- no rashes or itching  Cardiovascular- no chest pain or palpatations  Respiratory- no SOB or cough  GI- above  - no dysurea  Neuro- no weakness or syncope  Musculoskeletal- no muscle or joint pain  Heme- no bleeding or bruising  Lymphatic- no enlarged nodes or previous splenectomy  Endocrine- No sweating or heat/cold intolerance  Allergy- No asthma or hives  Psychiatric- no depression or  anxiety        Physical Exam:   AFVSS  A@O x3, NAD  NCAT, no scleral icterus  Neck nontender, no lymphadenopathy  Normal respiratory effort, no chest wall masses  RRR, 2+ pulses  Abdomen soft, no peritonitis, no masses, mulitple scars from past surgeries, end ileostomy in LLQ.  Superficial scar at skin level, only abut 2-3mm lumen and tender to digital insertion  Extremities warm and well perfused  No skin rashes or lesions    Labs: WBC 19.6, Hct 44, Plt 196, twila 87  K 3.0, otherwise lytes wnl, Cr down to 1.2 from admission of 1.5    Radiology: CT A/P: Status post colon resection. Left lower quadrant ileostomy.  No evidence of small bowel obstruction. No mesenteric inflammatory changes or free fluid identified.  No hydronephrosis.    A/P: 63y F here with end ileostomy in the LLQ which appears to have some dysfunction related to scar/stricture.  She does not appear to have any bowel obstruction or acute abdominal process at this time.  I think a superficial revision of her ileostomy is warranted, but can be done in a non-urgent setting.  Agree with rehydration and treatment of her current thrush to help with PO intake.  Will see her in office early next week and arrange from surgery from there.  Agree with Wound care consult as well.  Please call with any questions.

## 2018-12-13 NOTE — PROGRESS NOTES
Pt arrived to unit via gurney pt unable to ambulated from gurney to bed with slide board. Pt unable to dorsi and plantar flex bilateral extremities.

## 2018-12-13 NOTE — DIETARY
Nutrition services: Day 1 of admit.  Jana Overton is a 63 y.o. female with admitting DX of Abdominal pain.    Consult received for Poor PO intake and report of 15# (7.3%)  weight loss x 3 months. Pt is unable to keep anything down. Upon RD visit, pt reported that she was unable to keep down the NTL provided by SLP. She is willing to try Magic Cups with meals and Greek yogurt at snack.  She has a hx of Crohn's s/p colectomy and several ostomy revisions in the past. She also has a hx of COPD.       Assessment:  Height: 182.9 cm (6')  Weight: 80 kg (176 lb 5.9 oz)  Body mass index is 23.92 kg/m².  Diet/Intake: Full liquid NTL. No recorded PO intake yet but report from pt that she is not able to keep any intake down.    Evaluation:   1. Labs: WBC=19.6, potassium=3, glucose=105  2. Meds: Reglan and Zofran PRN   3. Fluids: 0.9 % NaCl with KCl 20 mEq infusion at 125 ml/hour. NS @ 250 ml/hour.  4. Skin: No report of stage 2 or > wounds  5. GI: output (loose and watery) in colostomy.   6. I/O's: +2028 ml since admit. No I&O reported today      Malnutrition Risk: Poor PO intake with significant wt loss of 15# x 3 months. Report of N&V with inability to keep any intake down. She has a dx of Crohn's with colectomy and several ostomy revisions in the past.     Recommendations/Plan:  1. Full liquid, NTL diet with Magic Cups with meals and Greek yogurt at snack.    2. Encourage intake of >50%.  3. Document intake of all meals, supplements and snacks  as % taken in ADL's to provide interdisciplinary communication across all shifts.   4. Monitor weight.  5. Nutrition rep will continue to see patient for ongoing meal and snack preferences.     RD will continue to monitor.

## 2018-12-13 NOTE — ED NOTES
Med rec complete per pt at bedside  Ok per Pt to discuss medications with visitor/s present  Allergies have been verified and updated  No oral ABX within the last 30 days

## 2018-12-13 NOTE — ASSESSMENT & PLAN NOTE
No flare, there appears to be possible stricture associated with her ostomy.  Patient complains of pain, no bleeding continue to monitor

## 2018-12-13 NOTE — ASSESSMENT & PLAN NOTE
Pre renal Improving with fluids, continue to monitor her metabolic panel and continue fluids overnight.  Treat dysphagia which I think is from thrush.  Avoid nephrotoxins  12/14: resolved with fluids, hold ivf and bmp in am  12/15: resolved.

## 2018-12-13 NOTE — ED PROVIDER NOTES
ED Provider Note    CHIEF COMPLAINT  Chief Complaint   Patient presents with   • N/V       HPI  Jana Overton is a 63 y.o. female who presents with nausea and vomiting for several weeks.  The patient presents to the emergency room department with nausea and vomiting abdominal pain.  Unable to hold anything down for quite some time.  She is a patient of Dr. Christy Bynum.  I discussed the case with Dr. Bynum and she knows her well.  She states she saw her last Friday and wanted the patient be admitted but the patient did not want to come in the hospital.  She continued to have vomiting at home unable to hold any fluids down.  Now she is very weak and lightheaded feels like she will pass out when she sits up.  Her pain medicines down as well.  Unable to get up and walk around at this time as well.  Was recently seen in the emergency department at [facility].    REVIEW OF SYSTEMS  CONSTITUTIONAL:  Denies fever, chills positive weight loss and generalized weakness  EYES:  Denies photophobia or discharge   ENT:  Denies sore throat, nose or ear pain.  She states her mouth is very dry  CARDIOVASCULAR:  Denies chest pain, palpitations or swelling  RESPIRATORY:  Denies cough or shortness of breath or difficulty breathing  GI: Positive moderate to severe mid lower abdominal pain.  She does have a colostomy.    MUSCULOSKELETAL:  Denies weakness joint swelling or back pain  SKIN:  No rash  ALLERGIC: No itchy rashes.  NEUROLOGIC: Positive headache but no focal weakness and she has increasing numbness to her neuropathy and hypersensitivity to her legs  PSYCHIATRIC: She is very depressed over the fact that she is sicker than she was previously.    PAST MEDICAL HISTORY  Past Medical History:   Diagnosis Date   • Anesthesia     difficult Iv stick   • Anxiety    • Arthritis     all over   • ASTHMA    • Atrial fib/flut    • Backpain    • Breath shortness    • Bronchitis     5 years   • Chronic pain    • Colostomy in  place ([facility]) 10/14/2010   • COPD (chronic obstructive pulmonary disease) ([facility]) 6/24/2014   • COPD (chronic obstructive pulmonary disease) ([facility]) 6/24/2014   • Crohn's disease of colon ([facility])    • Dyspnea    • History of cardiac murmur    • Hypokalemia 6/24/2014   • Indigestion    • Infectious disease     MRSA, VancoRSA   • Multiple falls 6/24/2014   • Narcotic dependence ([facility]) 9/23/2009   • Obstruction    • Other specified disorder of intestines     crohns disease   • Pain 7/11/13    all over   • Pneumonia     child and mid 30's   • Postherpetic neuralgia 6/24/2014   • Rosacea 6/24/2014   • Sleep apnea        FAMILY HISTORY  Family History   Problem Relation Age of Onset   • Lung Disease Mother         copd   • Diabetes Father    • Heart Disease Father    • Diabetes Sister    • Cancer Maternal Aunt         breast       SOCIAL HISTORY   reports that she has never smoked. She has never used smokeless tobacco. She reports that she drinks alcohol. She reports that she uses drugs.    SURGICAL HISTORY  Past Surgical History:   Procedure Laterality Date   • ILEOSTOMY  5/14/2014    Performed by Kevin Cruz M.D. at SURGERY [facility]   • MAMMOPLASTY REDUCTION  7/17/2013    Performed by Janey Burt M.D. at [facility]   • HARDWARE REMOVAL NEURO  7/16/2012    Performed by KEELEY KIM at [facility]   • GASTROSCOPY  10/4/2011    Performed by TYSON MARTINEZ at SURGERY SAME DAY [facility]   • COLONOSCOPY  10/4/2011    Performed by TYSON MARTINEZ at SURGERY SAME DAY [facility]   • DILATION AND CURETTAGE  9/24/2010    Performed by GAURAV ALY at SURGERY SAME DAY [facility]   • ILEOSTOMY  11/11/2009    Performed by KEVIN CRUZ at [facility]   • GASTROSCOPY WITH BIOPSY  11/22/08    Performed by NEGRITA HUYNH at [facility]   • ABDOMINAL EXPLORATION     • CERVICAL DISK AND FUSION ANTERIOR     • COLON RESECTION     • FOOT SURGERY     • HAND  SURGERY     • LUMBAR FUSION ANTERIOR     • LUMPECTOMY     • OTHER ABDOMINAL SURGERY      surgery for chrons disease   • PB REDUCTION OF LARGE BREAST         CURRENT MEDICATIONS  Home Medications     Reviewed by Keith Rodas (Pharmacy Tech) on 12/12/18 at 1623  Med List Status: Complete   Medication Last Dose Status   albuterol (VENTOLIN OR PROVENTIL) 108 (90 BASE) MCG/ACT Aero Soln <2months Active   aspirin (ASA) 81 MG Chew Tab chewable tablet >1week Active   Azelastine HCl 137 MCG/SPRAY Solution <2months Active   cetirizine (ZYRTEC) 10 MG Tab <2months Active   estradiol (ESTRACE) 0.1 MG/GM vaginal cream 12/10/2018 Active   FUROSEMIDE PO <4months Active   granisetron (KYTRIL) 1 MG Tab 11/16/2018 Active   ipratropium-albuterol (DUONEB) 0.5-2.5 (3) MG/3ML nebulizer solution <2months Active   Magnesium 100 MG Tab <1month Active   MAGNESIUM CITRATE PO <1month Active   metoprolol (LOPRESSOR) 25 MG Tab <1month Active   ondansetron (ZOFRAN ODT) 4 MG TABLET DISPERSIBLE <1month Active   oxycodone 20 MG/ML CONC 12/11/2018 Active   spironolactone (ALDACTONE) 25 MG Tab <1month Active                ALLERGIES  Allergies   Allergen Reactions   • Azathioprine Sodium Hives   • Carafate [Sucralfate] Rash     Generalizes/Mouth Sores   • Infliximab Hives   • Morphine Swelling   • Roxanol [Morphine Sulfate] Swelling     Throat swells   • Amitriptyline Unspecified     Nightmares     • Demerol Vomiting   • Fentanyl Unspecified     Patches caused skin breakdown, no pain relief   • Lorazepam Unspecified     States give me nightmares.    • Methadone    • Methotrexate Hives and Rash   • Pregabalin Rash     Rash     • Pseudoephedrine Vomiting   • Sulfa Drugs Hives   • Tape Rash     Skin peels off; paper tape   • Celestone [Betamethasone Sodium Phosphate] Unspecified     Pt unable to remember   • Sulfasalazine Unspecified     Pt unable to remember   • Tizanidine Hydrochloride Unspecified     Pt unable to remember       PHYSICAL  EXAM  VITAL SIGNS: /83   Pulse 98   Temp 37.2 °C (98.9 °F) (Temporal)   Resp 18   Ht 1.829 m (6')   Wt 80 kg (176 lb 5.9 oz)   SpO2 95%   BMI 23.92 kg/m²      Constitutional: Patient is awake alert person place and time.  Very ill looking female to hydrated.    HENT: Normocephalic, Atraumatic,  Bilateral external ears normal, Oropharynx pink very dry with no exudates, Nose patent.   Eyes:  Sclera and conjunctiva clear, No discharge.   Neck:  Supple no nuchal rigidity, no thyromegaly or mass. Non-tender  Lymphatic: No supraclavicular,  Cardiovascular: Heart is regular rate and rhythm no murmur  Thorax & Lungs: Chest is symmetrical, with good breath sounds. No wheezing or crackles. No respiratory distress  Abdomen:  Soft, all healed multiple scars her abdomen.  Abdomen is tenderness more periumbilical area.  She has a colostomy in place very small amount of stool.  Skin: Warm, Dry, no petechia, purpura or rash.   Back: Non tender with palpation, No CVA tenderness.   Extremities: Positive small amount of edema non tender.   Musculoskeletal: Full range of motion of her upper extremities.  She cannot raise her lower extremities up because of pain in her abdomen and generalized weakness.   Neurologic: Alert & oriented   Strength is 4/5 in the upper extremities.  She cannot raise her legs up off the bed however she states part of this due to pain in her abdomen.   Psychiatric: Flat affect      LABS:  Lab Results   Component Value Date    WBC 20.3 (H) 12/12/2018    WBC 7.9 02/10/2010    HEMOGLOBIN 18.9 (H) 12/12/2018    HEMATOCRIT 55.2 (H) 12/12/2018    PLATELETCT 248 12/12/2018    SODIUM 131 (L) 12/12/2018    POTASSIUM 3.7 12/12/2018    CHLORIDE 93 (L) 12/12/2018    CO2 25 12/12/2018    BUN 25 (H) 12/12/2018    GLUCOSE 114 (H) 12/12/2018    CHOLSTRLTOT 205 (H) 11/05/2018     (H) 11/05/2018    ALTSGPT 56 (H) 12/12/2018    ASTSGOT 59 (H) 12/12/2018    TSH 0.922 10/20/2010    INR 0.95 03/13/2016    HBA1C  5.3 08/27/2015       RADIOLOGY/PROCEDURES  CT scan pending    COURSE & MEDICAL DECISION MAKING  Pertinent Labs & Imaging studies reviewed. (See chart for details)  This is a long history of abdominal pain.  Now vomiting.  Not able holding him down for more than a week.  Lightheaded like she is going to pass out.  Clinically and laboratory work wise appears that she is quite dehydrated.  White count is elevated she is hemoconcentrated sodium is low glucose is mildly elevated mild renal failure from dehydration elevated liver enzymes.  Etiology of all of this is unclear.  We have given IV fluids wide open.  I am giving her pain medicines and antiemetics.  We have given her IV fluids because she is dehydrated clinically and by laboratory.  She is unable to hold fluids down so I cannot orally hydrate her.  CAT scan of the CT IV and oral contrast is pending.  I discussed the case with Dr. Christy Bynum as noted and once the CAT scan is done we will be able to further obtain consultations per medicine and surgery.  Of note is Dr. Bynum specifically wanted 1 of the colorectal surgeons from [facility] to see the patient in consultation when needed.    Rechecked her several times the IV fluids are running and she is feeling a little bit better after the pain medicines and antiemetics but I believe that she is getting continued IV hydration.        FINAL IMPRESSION  1.  Abdominal pain  2.  Dehydration  3.  Hyponatremia  4.  Elevated liver enzymes       PLAN  1.  Admission [facility]ist  2.  Consultation per surgery after CT scan pending    Electronically signed by: Francesco Moore, 12/12/2018 5:00 PM

## 2018-12-13 NOTE — ASSESSMENT & PLAN NOTE
12/14: Secondary to volume depletion, improved with IV fluids.  Stop fluids and observe overnight, bmp in am  12/15: resolved, monitor bmp in am

## 2018-12-13 NOTE — ASSESSMENT & PLAN NOTE
12/13:Improved slightly, no fever or signs of infection.  She does have thrush which could cause some leukocytosis and I will treat this with Diflucan.  12/14: Now resolved continue Diflucan thrush improving  12/15: continue diflucan cbc in am

## 2018-12-13 NOTE — CARE PLAN
Problem: Nutritional:  Goal: Achieve adequate nutritional intake  Patient will consume >50% of meals, supplements and snacks.  Outcome: NOT MET

## 2018-12-13 NOTE — ASSESSMENT & PLAN NOTE
12/13:Discussed with Dr. Nunez, appreciate consultation.  She appears to have a distal stricture and could use a conservative surgical revision of her ileostomy, this will be scheduled on an outpatient basis.  Her output from the ostomy today has been adequate, we are treating thrush which seems to be her main issue and keeping food down at this point.  12/14: ostomy output adequate, pain control adequate  12/15: ostomy output continues to be adequate.

## 2018-12-13 NOTE — PROGRESS NOTES
Dr. Newell pagediana. Pt wishing for medication to be available more often. Awaiting return page form

## 2018-12-13 NOTE — DIETARY
Nutrition Services: Consult noted for Poor PO intake and report of weight loss of 15# x 3 months. Pt is currently NPO. RD will follow for initiation of diet and address weight loss at that time.

## 2018-12-13 NOTE — ED NOTES
The hospitalist completed the pt assessment and a contract was made for the pt to have pain medication, dilaudid 0.5 mg iv,  q 4 hours last dose was 1728.  Next dose of iv pain medication will be at 2130.

## 2018-12-13 NOTE — THERAPY
"Speech Language Therapy Clinical Swallow Evaluation completed.    Functional Status: Pt was seen at bedside for CSE. Pt was alert, oriented x4 and fully participatory with all therapy objectives. Pt endorsed hx of pharyngeal dysphagia, and has previously participated with a Modified Barium Swallow Study on 12/18/2008. Study revealed: \"delayed oral phase, trace aspiration with thin liquids, vallecular pooling which cleared with chin tuck and effortful swallow.\" Pt consumes a Pureed solid, Thin/NTL diet at baseline. Oral mech was unremarkable; however, Pt with c/o nausea and had x1 episode of emesis during oral mech and x1 at end of session. Pt reported that she would like to clean her teeth prior to PO trials, and would prefer to start with ice chips.     Pt was administered PO trials of ice chips, and nectar-thick liquids (NTL). Pt declined PO trials of thins. Pt demonstrated no subtle nor overt clinical s/sx of aspiration with ice chips and/or nectar-thick liquids (NTL). Pt demonstrated good use of chin tuck strategy with NTL and used slow feeding rate with small bolus size independently. Advanced solid trials not completed this session due to concern for poor ostomy output, and will await clearance from GI/MD prior to PO trials of solid textures. Given adherence to safe swallow precautions, recommend Pt for Nectar-thick Full Liquid diet. SLP trained safe swallow precautions and compensatory strategies and Pt verbalized understanding with 100% accuracy. SLP following. Thank you for the consult.     Recommendations - Diet: Nectar Thick Full Liquid, Nectar Thick Liquid                          Strategies: Head of Bed at 90 Degrees, Chin tuck, Small bites/sips                          Medication Administration:  Whole with Liquid Wash, Float Whole with Puree, (Per preference/tolerance)    Plan of Care: Will benefit from Speech Therapy 3 times per week  Post-Acute Therapy: Recommend outpatient or home health " "transitional care services for continued speech therapy services    See \"Rehab Therapy-Acute\" Patient Summary Report for complete documentation.   "

## 2018-12-13 NOTE — FACE TO FACE
Face to Face Supporting Documentation - Home Health    The encounter with this patient was in whole or in part the primary reason for home health admission.    Date of encounter:   Patient:                    MRN:                       YOB: 2020  Jana Overton  [id number]  1955     Home health to see patient for:  Skilled Nursing care for assessment, interventions & education    Skilled need for:  Exacerbation of Chronic Disease State ileostomy, thrush    Skilled nursing interventions to include:  Wound Care    Homebound status evidenced by:  Needs the assistance of another person in order to leave the home. Leaving home requires a considerable and taxing effort. There is a normal inability to leave the home.    Community Physician to provide follow up care: Amanda Bazzi M.D.     Optional Interventions? No      I certify the face to face encounter for this home health care referral meets the CMS requirements and the encounter/clinical assessment with the patient was, in whole, or in part, for the medical condition(s) listed above, which is the primary reason for home health care. Based on my clinical findings: the service(s) are medically necessary, support the need for home health care, and the homebound criteria are met.  I certify that this patient has had a face to face encounter by myself.  Casandra Fulton M.D. - NPI: [id number]

## 2018-12-13 NOTE — CARE PLAN
Problem: Safety  Goal: Will remain free from injury  Outcome: PROGRESSING AS EXPECTED  Pt instructed on importance of using call light and call light placed within reach.     Problem: Knowledge Deficit  Goal: Knowledge of disease process/condition, treatment plan, diagnostic tests, and medications will improve  Outcome: PROGRESSING AS EXPECTED  Discussed with patient the importance of handwashing. Pt demonstrates proper handwashing.

## 2018-12-13 NOTE — CARE PLAN
Problem: Venous Thromboembolism (VTW)/Deep Vein Thrombosis (DVT) Prevention:  Goal: Patient will participate in Venous Thrombosis (VTE)/Deep Vein Thrombosis (DVT)Prevention Measures  Outcome: PROGRESSING AS EXPECTED   12/12/18 2215   Mechanical/VTE Prophylaxis   Mechanical Prophylaxis  (Not ordered)   OTHER   Pharmacologic Prophylaxis Used Unfractionated Heparin       Problem: Pain Management  Goal: Pain level will decrease to patient's comfort goal  Outcome: PROGRESSING AS EXPECTED   12/13/18 0306   OTHER   Non Verbal Scale  Calm;Sleeping;Unlabored Breathing   Pt educated to call for pain medication when needed.

## 2018-12-13 NOTE — H&P
Hospital Medicine History & Physical Note    Date of Service  12/12/2018    Primary Care Physician  Amanda Bazzi M.D.    Consultants  She has an appointment with \[facility]\"" in 2 weeks- they will need to be contacted in the morning.   Her GI doc is Dr Bynum who I called about her tonight to discuss the case. She is recommending a surgical consultation.   Code Status  full    Chief Complaint  weakness    History of Presenting Illness  63 y.o. female who presented 12/12/2018 with weakness. She has a complex history of Crohn's s/p a total colectomy and several subsequent ostomy revisions in the past. She was discharged a week ago with poor oral intake and persistent poor ostomy output that she says is just a small squirt/stream at times. She is unable to keep anything down. She says she has a a low grade temp once with home health of 100.4 but no other fevers. Home health sent her in today as she  Was not doing well at home becoming weaker daily.     Review of Systems  Review of Systems   Constitutional: Positive for malaise/fatigue and weight loss. Negative for chills and fever.   HENT: Negative for sore throat.    Eyes: Negative for blurred vision and pain.   Respiratory: Negative for cough and shortness of breath.    Cardiovascular: Negative for chest pain and palpitations.   Gastrointestinal: Positive for abdominal pain, nausea and vomiting.   Genitourinary: Negative for dysuria and urgency.   Musculoskeletal: Negative for back pain and neck pain.   Skin: Negative for itching and rash.   Neurological: Positive for weakness. Negative for dizziness, tingling and headaches.   Psychiatric/Behavioral: Negative for depression. The patient does not have insomnia.    All other systems reviewed and are negative.      Past Medical History   has a past medical history of Anesthesia; Anxiety; Arthritis; ASTHMA; Atrial fib/flut; Backpain; Breath shortness; Bronchitis; Chronic pain; Colostomy in place (HCC)  (10/14/2010); COPD (chronic obstructive pulmonary disease) ([facility]) (6/24/2014); COPD (chronic obstructive pulmonary disease) ([facility]) (6/24/2014); Crohn's disease of colon ([facility]); Dyspnea; History of cardiac murmur; Hypokalemia (6/24/2014); Indigestion; Infectious disease; Multiple falls (6/24/2014); Narcotic dependence ([facility]) (9/23/2009); Obstruction; Other specified disorder of intestines; Pain (7/11/13); Pneumonia; Postherpetic neuralgia (6/24/2014); Rosacea (6/24/2014); and Sleep apnea. She also has no past medical history of CAD (coronary artery disease); Liver disease; or Seizure disorder ([facility]).    Surgical History   has a past surgical history that includes lumbar fusion anterior; cervical disk and fusion anterior; foot surgery; hand surgery; other abdominal surgery; gastroscopy with biopsy (11/22/08); ileostomy (11/11/2009); dilation and curettage (9/24/2010); gastroscopy (10/4/2011); colonoscopy (10/4/2011); hardware removal neuro (7/16/2012); mammoplasty reduction (7/17/2013); ileostomy (5/14/2014); pr reduction of large breast; abdominal exploration; lumpectomy; and colon resection.     Family History  family history includes Cancer in her maternal aunt; Diabetes in her father and sister; Heart Disease in her father; Lung Disease in her mother.     Social History   reports that she has never smoked. She has never used smokeless tobacco. She reports that she drinks alcohol. She reports that she uses drugs.    Allergies  Allergies   Allergen Reactions   • Azathioprine Sodium Hives   • Carafate [Sucralfate] Rash     Generalizes/Mouth Sores   • Infliximab Hives   • Morphine Swelling   • Roxanol [Morphine Sulfate] Swelling     Throat swells   • Amitriptyline Unspecified     Nightmares     • Demerol Vomiting   • Fentanyl Unspecified     Patches caused skin breakdown, no pain relief   • Lorazepam Unspecified     States give me nightmares.    • Methadone    • Methotrexate Hives and Rash   • Pregabalin Rash     Rash      • Pseudoephedrine Vomiting   • Sulfa Drugs Hives   • Tape Rash     Skin peels off; paper tape   • Celestone [Betamethasone Sodium Phosphate] Unspecified     Pt unable to remember   • Sulfasalazine Unspecified     Pt unable to remember   • Tizanidine Hydrochloride Unspecified     Pt unable to remember       Medications  Prior to Admission Medications   Prescriptions Last Dose Informant Patient Reported? Taking?   Azelastine HCl 137 MCG/SPRAY Solution <2months at Unknown Patient Yes No   Sig: Spray 1 Spray in nose 2 times a day as needed (dry sinus).   FUROSEMIDE PO <4months at Unknown Patient Yes Yes   Sig: Take 1 Tab by mouth every 14 days.   MAGNESIUM CITRATE PO <1month at Unknown Patient Yes Yes   Sig: Take 1 Tab by mouth every 48 hours.   Magnesium 100 MG Tab <1month at Unknown Patient Yes No   Sig: Take 100 mg by mouth every 48 hours.   albuterol (VENTOLIN OR PROVENTIL) 108 (90 BASE) MCG/ACT Aero Soln <2months at PRN Patient Yes No   Sig: Inhale 2 Puffs by mouth every 6 hours as needed.   aspirin (ASA) 81 MG Chew Tab chewable tablet >1week at PRN Patient Yes No   Sig: Take 81 mg by mouth 1 time daily as needed (thrombosis).   cetirizine (ZYRTEC) 10 MG Tab <2months at Unknown Patient Yes No   Sig: Take 10 mg by mouth 1 time daily as needed for Allergies.   estradiol (ESTRACE) 0.1 MG/GM vaginal cream 12/10/2018 at Unknown Patient Yes No   Sig: Insert 0.5 g in vagina every 72 hours.   granisetron (KYTRIL) 1 MG Tab 11/16/2018 at Unknown Patient No No   Sig: Take 1 Tab by mouth every 12 hours as needed for Nausea/Vomiting.   ipratropium-albuterol (DUONEB) 0.5-2.5 (3) MG/3ML nebulizer solution <2months at Unknown Patient Yes No   Sig: 3 mL by Nebulization route 4 times a day as needed for Shortness of Breath.   metoprolol (LOPRESSOR) 25 MG Tab <1month at Unknown Patient Yes No   Sig: Take 25 mg by mouth 2 times a day.   ondansetron (ZOFRAN ODT) 4 MG TABLET DISPERSIBLE <1month at Unknown Patient No No   Sig: Take 1  Tab by mouth every 6 hours as needed for Nausea.   oxycodone 20 MG/ML CONC 12/11/2018 at AM Patient Yes No   Sig: Take 30 mg by mouth every four hours as needed (pain). 20mg/ml solution   spironolactone (ALDACTONE) 25 MG Tab <1month at Unknown Patient No No   Sig: TAKE 2 TABLETS BY MOUTH TWICE DAILY      Facility-Administered Medications: None       Physical Exam  Temp:  [36.6 °C (97.9 °F)-37.2 °C (98.9 °F)] 36.6 °C (97.9 °F)  Pulse:  [] 98  Resp:  [11-23] 12  BP: (157)/(83) 157/83    Physical Exam   Constitutional: She is oriented to person, place, and time. She appears well-developed and well-nourished. No distress.   Patient seen and examined  Discussed plan with RN   HENT:   Right Ear: External ear normal.   Left Ear: External ear normal.   Nose: Nose normal.   Eyes: Conjunctivae are normal. Right eye exhibits no discharge. Left eye exhibits no discharge.   Neck: No JVD present.   Cardiovascular: Regular rhythm and normal heart sounds.    No murmur heard.  Cap refill 2sec  Pulses 2+ throughout     Pulmonary/Chest: Effort normal and breath sounds normal. No stridor. No respiratory distress. She has no wheezes. She has no rales.   Abdominal: Soft. Bowel sounds are normal. She exhibits no distension. There is no tenderness.   RLQ ostomy, diffuse ttp   Musculoskeletal: She exhibits no edema or tenderness.   Neurological: She is alert and oriented to person, place, and time.   Skin: Skin is warm and dry. She is not diaphoretic. No erythema. There is pallor.   Normal skin  Color.    Psychiatric: She has a normal mood and affect. Her behavior is normal.   Nursing note and vitals reviewed.      Laboratory:  Recent Labs      12/12/18   1505   WBC  20.3*   RBC  6.38*   HEMOGLOBIN  18.9*   HEMATOCRIT  55.2*   MCV  86.7   MCH  29.5   MCHC  34.0   RDW  38.0   PLATELETCT  248   MPV  11.7     Recent Labs      12/12/18   1505   SODIUM  131*   POTASSIUM  3.7   CHLORIDE  93*   CO2  25   GLUCOSE  114*   BUN  25*   CREATININE   1.49*   CALCIUM  10.8*     Recent Labs      12/12/18   1505   ALTSGPT  56*   ASTSGOT  59*   ALKPHOSPHAT  131*   TBILIRUBIN  1.6*   LIPASE  27   GLUCOSE  114*         Recent Labs      12/12/18   1505   BNPBTYPENAT  478*         Recent Labs      12/12/18   1505   TROPONINI  0.03       Urinalysis:    No results found     Imaging:  CT-ABDOMEN-PELVIS WITH   Final Result      Status post colon resection. Left lower quadrant ileostomy.   No evidence of small bowel obstruction. No mesenteric inflammatory changes or free fluid identified.   No hydronephrosis.      DX-CHEST-PORTABLE (1 VIEW)   Final Result      No radiographic evidence of acute cardiopulmonary process.            Assessment/Plan:  I anticipate this patient will require at least two midnights for appropriate medical management, necessitating inpatient admission.    Crohn's disease of small intestine with complication ([facility])- (present on admission)   Assessment & Plan    No obvious flare. Check esr/crp  Symptom control  Multiple intolerances to meds in past.      Leukocytosis   Assessment & Plan     Likely hemoconcentration 2/2 dehydration. I do not believe she is septic or has an infection at present given her history. Consider sepsis with fevers or other symptoms here of infection.      Liver enzyme elevation   Assessment & Plan    Possible dehydration related??  Trend and hydrate for now.      ARF (acute renal failure) ([facility])   Assessment & Plan    2/2 dehydration  Iv fluids     Hyponatremia   Assessment & Plan    Iv fluids     Dehydration   Assessment & Plan    Severe. Iv fluids and trend.     Paroxysmal atrial fibrillation (HCC)- (present on admission)   Assessment & Plan    In nsr. Trend on tele.     Continue BB as tolerated.   Not on anticoagulation likely 2/2 gi bleeding in past.      COPD (chronic obstructive pulmonary disease) ([facility])- (present on admission)   Assessment & Plan    No flare. Rt protocol.      Ileostomy in place ([facility])- (present on  admission)   Assessment & Plan    It would appear that this is her major issue presently. She has pinpoint output and unable to keep oral intake down. Surgical consult with [facility] in am will be made.      GERD (gastroesophageal reflux disease)- (present on admission)   Assessment & Plan    Consider iv meds but no severe sx at present.      Chronic pain- (present on admission)   Assessment & Plan    Has been unable to take her pain meds at home. Iv dilaudid for now. We discussed her drug history and she appears to be very compliant now and does not appear to be drug seeking.          VTE prophylaxis: lovenox

## 2018-12-13 NOTE — ED NOTES
Rounds completed.  The pt cont to c/o nausea and abd pain and requested ice chips.  Ice chips given to the pt at the pt persistence.

## 2018-12-14 LAB
ANION GAP SERPL CALC-SCNC: 4 MMOL/L (ref 0–11.9)
BASOPHILS # BLD AUTO: 0.3 % (ref 0–1.8)
BASOPHILS # BLD: 0.03 K/UL (ref 0–0.12)
BUN SERPL-MCNC: 11 MG/DL (ref 8–22)
CALCIUM SERPL-MCNC: 8.8 MG/DL (ref 8.4–10.2)
CHLORIDE SERPL-SCNC: 111 MMOL/L (ref 96–112)
CO2 SERPL-SCNC: 21 MMOL/L (ref 20–33)
CREAT SERPL-MCNC: 0.78 MG/DL (ref 0.5–1.4)
EOSINOPHIL # BLD AUTO: 0.06 K/UL (ref 0–0.51)
EOSINOPHIL NFR BLD: 0.6 % (ref 0–6.9)
ERYTHROCYTE [DISTWIDTH] IN BLOOD BY AUTOMATED COUNT: 43 FL (ref 35.9–50)
GLUCOSE SERPL-MCNC: 107 MG/DL (ref 65–99)
HCT VFR BLD AUTO: 35.2 % (ref 37–47)
HGB BLD-MCNC: 11.3 G/DL (ref 12–16)
IMM GRANULOCYTES # BLD AUTO: 0.06 K/UL (ref 0–0.11)
IMM GRANULOCYTES NFR BLD AUTO: 0.6 % (ref 0–0.9)
LYMPHOCYTES # BLD AUTO: 1.23 K/UL (ref 1–4.8)
LYMPHOCYTES NFR BLD: 11.5 % (ref 22–41)
MCH RBC QN AUTO: 29.7 PG (ref 27–33)
MCHC RBC AUTO-ENTMCNC: 32.1 G/DL (ref 33.6–35)
MCV RBC AUTO: 92.4 FL (ref 81.4–97.8)
MONOCYTES # BLD AUTO: 0.87 K/UL (ref 0–0.85)
MONOCYTES NFR BLD AUTO: 8.1 % (ref 0–13.4)
NEUTROPHILS # BLD AUTO: 8.44 K/UL (ref 2–7.15)
NEUTROPHILS NFR BLD: 78.9 % (ref 44–72)
NRBC # BLD AUTO: 0 K/UL
NRBC BLD-RTO: 0 /100 WBC
PLATELET # BLD AUTO: 145 K/UL (ref 164–446)
PMV BLD AUTO: 12.2 FL (ref 9–12.9)
POTASSIUM SERPL-SCNC: 3.7 MMOL/L (ref 3.6–5.5)
RBC # BLD AUTO: 3.81 M/UL (ref 4.2–5.4)
SODIUM SERPL-SCNC: 136 MMOL/L (ref 135–145)
WBC # BLD AUTO: 10.7 K/UL (ref 4.8–10.8)

## 2018-12-14 PROCEDURE — 770020 HCHG ROOM/CARE - TELE (206)

## 2018-12-14 PROCEDURE — 665998 HH PPS REVENUE CREDIT

## 2018-12-14 PROCEDURE — 85025 COMPLETE CBC W/AUTO DIFF WBC: CPT

## 2018-12-14 PROCEDURE — 700101 HCHG RX REV CODE 250: Performed by: HOSPITALIST

## 2018-12-14 PROCEDURE — 92526 ORAL FUNCTION THERAPY: CPT

## 2018-12-14 PROCEDURE — 80048 BASIC METABOLIC PNL TOTAL CA: CPT

## 2018-12-14 PROCEDURE — 700111 HCHG RX REV CODE 636 W/ 250 OVERRIDE (IP): Performed by: HOSPITALIST

## 2018-12-14 PROCEDURE — 665999 HH PPS REVENUE DEBIT

## 2018-12-14 PROCEDURE — 99232 SBSQ HOSP IP/OBS MODERATE 35: CPT | Performed by: HOSPITALIST

## 2018-12-14 PROCEDURE — 36415 COLL VENOUS BLD VENIPUNCTURE: CPT

## 2018-12-14 RX ORDER — HYDROMORPHONE HYDROCHLORIDE 1 MG/ML
1 INJECTION, SOLUTION INTRAMUSCULAR; INTRAVENOUS; SUBCUTANEOUS EVERY 4 HOURS PRN
Status: DISCONTINUED | OUTPATIENT
Start: 2018-12-14 | End: 2018-12-16 | Stop reason: HOSPADM

## 2018-12-14 RX ADMIN — ONDANSETRON 4 MG: 2 INJECTION INTRAMUSCULAR; INTRAVENOUS at 08:15

## 2018-12-14 RX ADMIN — HYDROMORPHONE HYDROCHLORIDE 0.5 MG: 1 INJECTION, SOLUTION INTRAMUSCULAR; INTRAVENOUS; SUBCUTANEOUS at 04:10

## 2018-12-14 RX ADMIN — ONDANSETRON 4 MG: 2 INJECTION INTRAMUSCULAR; INTRAVENOUS at 20:35

## 2018-12-14 RX ADMIN — ONDANSETRON 4 MG: 2 INJECTION INTRAMUSCULAR; INTRAVENOUS at 04:09

## 2018-12-14 RX ADMIN — HYDROMORPHONE HYDROCHLORIDE 1 MG: 1 INJECTION, SOLUTION INTRAMUSCULAR; INTRAVENOUS; SUBCUTANEOUS at 20:35

## 2018-12-14 RX ADMIN — HEPARIN SODIUM 5000 UNITS: 5000 INJECTION, SOLUTION INTRAVENOUS; SUBCUTANEOUS at 05:17

## 2018-12-14 RX ADMIN — HYDROMORPHONE HYDROCHLORIDE 1 MG: 1 INJECTION, SOLUTION INTRAMUSCULAR; INTRAVENOUS; SUBCUTANEOUS at 16:34

## 2018-12-14 RX ADMIN — HEPARIN SODIUM 5000 UNITS: 5000 INJECTION, SOLUTION INTRAVENOUS; SUBCUTANEOUS at 22:32

## 2018-12-14 RX ADMIN — ONDANSETRON 4 MG: 2 INJECTION INTRAMUSCULAR; INTRAVENOUS at 12:24

## 2018-12-14 RX ADMIN — POTASSIUM CHLORIDE AND SODIUM CHLORIDE: 900; 150 INJECTION, SOLUTION INTRAVENOUS at 13:30

## 2018-12-14 RX ADMIN — POTASSIUM CHLORIDE AND SODIUM CHLORIDE: 900; 150 INJECTION, SOLUTION INTRAVENOUS at 04:13

## 2018-12-14 RX ADMIN — FLUCONAZOLE 200 MG: 2 INJECTION, SOLUTION INTRAVENOUS at 05:16

## 2018-12-14 RX ADMIN — HEPARIN SODIUM 5000 UNITS: 5000 INJECTION, SOLUTION INTRAVENOUS; SUBCUTANEOUS at 13:26

## 2018-12-14 RX ADMIN — HYDROMORPHONE HYDROCHLORIDE 0.5 MG: 1 INJECTION, SOLUTION INTRAMUSCULAR; INTRAVENOUS; SUBCUTANEOUS at 08:15

## 2018-12-14 RX ADMIN — ONDANSETRON 4 MG: 2 INJECTION INTRAMUSCULAR; INTRAVENOUS at 16:34

## 2018-12-14 RX ADMIN — HYDROMORPHONE HYDROCHLORIDE 0.5 MG: 1 INJECTION, SOLUTION INTRAMUSCULAR; INTRAVENOUS; SUBCUTANEOUS at 12:24

## 2018-12-14 ASSESSMENT — PAIN SCALES - GENERAL
PAINLEVEL_OUTOF10: 8
PAINLEVEL_OUTOF10: 8
PAINLEVEL_OUTOF10: 7
PAINLEVEL_OUTOF10: 5
PAINLEVEL_OUTOF10: 6
PAINLEVEL_OUTOF10: 6
PAINLEVEL_OUTOF10: 8

## 2018-12-14 ASSESSMENT — ENCOUNTER SYMPTOMS
LOSS OF CONSCIOUSNESS: 0
NEUROLOGICAL NEGATIVE: 1
NERVOUS/ANXIOUS: 0
WEAKNESS: 0
MUSCULOSKELETAL NEGATIVE: 1
VOMITING: 1
CARDIOVASCULAR NEGATIVE: 1
CONSTITUTIONAL NEGATIVE: 1
DEPRESSION: 0
FLANK PAIN: 0
SHORTNESS OF BREATH: 0
COUGH: 0
CHILLS: 0
RESPIRATORY NEGATIVE: 1
BRUISES/BLEEDS EASILY: 0
MYALGIAS: 0
WEIGHT LOSS: 0
DIZZINESS: 0
NAUSEA: 1
FEVER: 0
ABDOMINAL PAIN: 1
PSYCHIATRIC NEGATIVE: 1
BACK PAIN: 0

## 2018-12-14 NOTE — PROGRESS NOTES
Telemetry Strip    Strip printed: 0739  Measurements from am strip were as follows:  Rhythm:  SR  HR: 92  Measurements:  .14/.08/.34    Telemetry Shift Summary:    Rhythm: SR/ST  HR: 90s-100s  Ectopy: Freq. PVCs, Occ [place]          Normal Values  Rhythm SR  HR Range    Measurements 0.12-0.20 / 0.06-0.10  / 0.30-0.52

## 2018-12-14 NOTE — ASSESSMENT & PLAN NOTE
12/13: She has been unable to tolerate nystatin swish and swallow.  Patient having recurrent episodes of regurgitated food that does not pass to the esophagus.  Start treatment with Diflucan 200 milligrams IV daily, if she does not improve then will get upper GI series.  12/14: Improved continue Diflucan 200 mg IV daily until she can keep down food adequately, she is starting to tolerate some soft foods.

## 2018-12-14 NOTE — PROGRESS NOTES
Telemetry Shift Summary    Rhythm SR/ST  HR Range Low 70s - s120s  Ectopy oPVC, rPAC  Measurements 0.16/0.08/0.36        Normal Values  Rhythm SR  HR Range    Measurements 0.12-0.20 / 0.06-0.10  / 0.30-0.52

## 2018-12-14 NOTE — THERAPY
"Occupational Therapy Evaluation completed.   Functional Status:  Pt is a 64 y/o female, admit with vomiting, and inability to keep food down. Pt reported being bed bound since the beginning of November.  is her caregiver for all ADLS, functional transfers and IADLS. Pt presents as a poor historian and is self limiting in her ADLS and functional mobility. Pt was resistant to participate in AROM and MMT, however, was observed functionally. Would not assist with LB dressing. Pt was able to complete functional transfers with SBA to CGA with a FWW. Pt will be seen for initial evaluation and treatment only, as she is non compliant with participation and functional in this setting .  Plan of Care: Patient with no further skilled OT needs in the acute care setting at this time  Discharge Recommendations:  Equipment: No Equipment Needed. Post-acute therapy Currently anticipate no further skilled therapy needs once patient is discharged from the inpatient setting.    See \"Rehab Therapy-Acute\" Patient Summary Report for complete documentation.    "

## 2018-12-14 NOTE — ASSESSMENT & PLAN NOTE
Symptomatic with hand cramps and foot cramps.  IV potassium chloride 40 mEq over 4 hours and recheck BMP, in a.m.  12/14: improved up to 3.7 bmp in am

## 2018-12-14 NOTE — DISCHARGE PLANNING
ATTN: Case Management  RE: Referral for Home Health    As of 12/14/18, we have accepted the Home Health referral for the patient listed above.    A [facility] clinician will be out to see the patient within 48 hours. If you have any questions or concerns regarding the patient’s transition to Home Health, please do not hesitate to contact us at [phone].      We look forward to collaborating with you,  [facility] Health Team

## 2018-12-14 NOTE — THERAPY
"Speech Language Therapy dysphagia treatment completed.     Functional Status: Pt was seen at bedside for f/u dysphagia tx session. Pt's , Goyo, was present for duration of today's session. Pt reported limited PO intake secondary to intermittent nausea/vomiting and lack of dairy-free options. Pt was agreeable to participate with PO trials.     Pt was administered PO trials of thin liquids (cup sips) and dysphagia 2 solids (peaches). Pt utilized single sips, chin tuck and effortful swallows independently with thin liquids. Pt demonstrated no clinical s/sx of aspiration/penetration with single sips; however, limited trials were completed secondary to Pt's request to have time in between sips to ensure that she would not vomit. Pt consumed small bites of peaches independently and demonstrated appropriate mastication. Pt reported mild globus sensation after the swallow which cleared with liquid wash. Pt reported that she would like to 'snack' on the juice and fruit cup throughout the morning, with request to have mashed hard-boiled eggs and raspberry sherbet for snacks before lunch. Of note, Pt demonstrated facial grimacing during the pharyngeal swallow phase; however, per chart review, this behavior is baseline. Suspect this behavior is secondary to her effortful swallow maneuver. Pt and Goyo verbalized understanding of safe swallow precautions and compensatory strategies with 100% accuracy independently. SLP following.    Recommendations: Given adherence to safe swallow precautions and chin tuck with thin liquids, recommend diet upgrade to Dysphagia 2 solids, Thin liquids and Pills per preference/tolerance.     Plan of Care: Will benefit from Speech Therapy 3 times per week    Post-Acute Therapy: Recommend outpatient or home health transitional care services for continued speech therapy services    See \"Rehab Therapy-Acute\" Patient Summary Report for complete documentation.     "

## 2018-12-14 NOTE — DISCHARGE PLANNING
Anticipated Discharge Disposition: Home w/ HH    Action: LSW met w/ pt at bedside to explain and receive choice for HH. Pt reports 1) [facility]. [facility] notified.    Pt requested either a private room or a room where pt's hospital bed fits properly and does not get bumped. Pt reports that pt's bed has been getting repeatedly bumped causing significant pain. LSW spoke w/ pt's RN and requested pt be moved to a room that pt's hospital bed fits properly and does not get bumped. RN stated that pt's roommate will be d/cing soon and RN will see if pt can be switched to 213/2 from 213/1.     Barriers to Discharge: None    Plan: Pending m/c and acceptance to HH.

## 2018-12-14 NOTE — PROGRESS NOTES
[facility] Medicine Daily Progress Note    Date of Service  12/13/2018    Chief Complaint  63 y.o. female admitted 12/12/2018 with severe weakness and inability to eat.    Hospital Course    63 y.o. female who presented 12/12/2018 with weakness. She has a complex history of Crohn's s/p a total colectomy and several subsequent ostomy revisions in the past. She was discharged a week ago with poor oral intake and persistent poor ostomy output that she says is just a small squirt/stream at times. She is unable to keep anything down. She says she has a a low grade temp once with home health of 100.4 but no other fevers. Home health sent her in today as she  Was not doing well at home becoming weaker daily.          Interval Problem Update  She states that as she is trying to drink water putting there has been regurgitation before it goes down her throat.  She was getting nystatin treatment for oral thrush at home but states she was not able to swallow it.  She does have mouth and throat pain and dysphasia.  Complaining of cramps of the hands.    Consultants/Specialty  Colorectal surgery    Code Status  Full code    Disposition  To be determined    Review of Systems  Review of Systems   Constitutional: Negative.  Negative for chills, fever, malaise/fatigue and weight loss.   HENT: Negative.    Respiratory: Negative.  Negative for cough and shortness of breath.    Cardiovascular: Negative.  Negative for chest pain and leg swelling.   Gastrointestinal: Positive for vomiting. Negative for abdominal pain, constipation, diarrhea and nausea.   Genitourinary: Negative.  Negative for dysuria and flank pain.   Musculoskeletal: Positive for back pain and myalgias. Negative for falls and joint pain.   Neurological: Negative.  Negative for dizziness, loss of consciousness and weakness.   Endo/Heme/Allergies: Negative.  Does not bruise/bleed easily.   Psychiatric/Behavioral: Negative.  Negative for depression. The patient is not  nervous/anxious.    All other systems reviewed and are negative.       Physical Exam  Temp:  [36.6 °C (97.9 °F)-37.5 °C (99.5 °F)] 37.3 °C (99.2 °F)  Pulse:  [] 90  Resp:  [12-23] 18  BP: (133-144)/(44-87) 144/87    Physical Exam   Constitutional: She is oriented to person, place, and time. She appears well-developed and well-nourished. No distress.   Plan of care discussed with bedside RN.   HENT:   Nose: Nose normal.   Mouth/Throat: Oropharyngeal exudate (White exudates of tongue and palate.,  Anterior oropharynx) present.   Eyes: Conjunctivae are normal. Right eye exhibits no discharge. Left eye exhibits no discharge. No scleral icterus.   Neck: No JVD present. No tracheal deviation present.   Cardiovascular: Normal rate, regular rhythm and normal heart sounds.    Pulmonary/Chest: Effort normal and breath sounds normal. No stridor. No respiratory distress. She has no wheezes. She has no rales. She exhibits no tenderness.   Abdominal: Soft. Bowel sounds are normal. She exhibits no distension and no mass. There is tenderness. There is guarding. There is no rebound.   Multiple abdominal scars, well-healed.  Total of abdomen is quite firm.  She has a left lower quadrant ileostomy in place with brown liquid output.   Musculoskeletal: She exhibits no edema or tenderness.   Neurological: She is alert and oriented to person, place, and time. No cranial nerve deficit. She exhibits normal muscle tone.   Skin: Skin is warm and dry. She is not diaphoretic. No pallor.   Psychiatric: She has a normal mood and affect. Her behavior is normal. Judgment and thought content normal.   Nursing note and vitals reviewed.      Fluids    Intake/Output Summary (Last 24 hours) at 12/13/18 1604  Last data filed at 12/13/18 0600   Gross per 24 hour   Intake             2028 ml   Output                0 ml   Net             2028 ml       Laboratory  Recent Labs      12/12/18   1505  12/13/18   0538   WBC  20.3*  19.6*   RBC  6.38*  4.93    HEMOGLOBIN  18.9*  14.6   HEMATOCRIT  55.2*  43.7   MCV  86.7  88.6   MCH  29.5  29.6   MCHC  34.0  33.4*   RDW  38.0  39.8   PLATELETCT  248  196   MPV  11.7  11.8     Recent Labs      12/12/18   1505  12/13/18   0538   SODIUM  131*  136   POTASSIUM  3.7  3.0*   CHLORIDE  93*  101   CO2  25  26   GLUCOSE  114*  105*   BUN  25*  21   CREATININE  1.49*  1.19   CALCIUM  10.8*  9.5         Recent Labs      12/12/18   1505   BNPBTYPENAT  478*           Imaging  CT-ABDOMEN-PELVIS WITH   Final Result      Status post colon resection. Left lower quadrant ileostomy.   No evidence of small bowel obstruction. No mesenteric inflammatory changes or free fluid identified.   No hydronephrosis.      DX-CHEST-PORTABLE (1 VIEW)   Final Result      No radiographic evidence of acute cardiopulmonary process.           Assessment/Plan  Crohn's disease of small intestine with complication (HCC)- (present on admission)   Assessment & Plan    No flare, there appears to be possible stricture associated with her ostomy.       Hypokalemia   Assessment & Plan    Symptomatic with hand cramps and foot cramps.  IV potassium chloride 40 mEq over 4 hours and recheck BMP, in a.m.     Thrush of mouth and esophagus (HCC)   Assessment & Plan    She has been unable to tolerate nystatin swish and swallow.  Patient having recurrent episodes of regurgitated food that does not pass to the esophagus.  Start treatment with Diflucan 2 to milligrams IV daily, if she does not improve then will get upper GI series.     Leukocytosis   Assessment & Plan    Improved slightly, no fever or signs of infection.  She does have thrush which could cause some leukocytosis and I will treat this with Diflucan.       Liver enzyme elevation   Assessment & Plan    Resolved possibly related to dehydration.  Continue to monitor.  On IV fluids.     ARF (acute renal failure) (HCC)   Assessment & Plan    Pre renal Improving with fluids, continue to monitor her metabolic panel and  continue fluids overnight.  Treat dysphagia which I think is from thrush.  Avoid nephrotoxins     Hyponatremia   Assessment & Plan    Secondary to volume depletion, improved with IV fluids.     Dehydration   Assessment & Plan    Severe.  Improved continue IV fluids.     Paroxysmal atrial fibrillation (HCC)- (present on admission)   Assessment & Plan    In nsr. Trend on tele.     Continue BB as tolerated.   Not on anticoagulation likely 2/2 gi bleeding in past.      COPD (chronic obstructive pulmonary disease) ([facility])- (present on admission)   Assessment & Plan    No flare. Rt protocol.      Ileostomy in place ([facility])- (present on admission)   Assessment & Plan    Discussed with Dr. Nunez, appreciate consultation.  She appears to have a distal stricture and could use a conservative surgical revision of her ileostomy, this will be scheduled on an outpatient basis.  Her output from the ostomy today has been adequate, we are treating thrush which seems to be her main issue and keeping food down at this point.     GERD (gastroesophageal reflux disease)- (present on admission)   Assessment & Plan    Consider iv meds but no severe sx at present.      Chronic pain- (present on admission)   Assessment & Plan    IV Dilaudid every 3 hours as needed for severe pain.  Treat thrush and resume home oral pain medications when able.            VTE prophylaxis: Heparin

## 2018-12-14 NOTE — DISCHARGE PLANNING
Received Choice form at 0907  Agency/Facility Name: [facility]  Referral sent per Choice form @ 7190

## 2018-12-15 PROBLEM — D64.9 ANEMIA: Status: ACTIVE | Noted: 2018-12-15

## 2018-12-15 LAB
ANION GAP SERPL CALC-SCNC: 6 MMOL/L (ref 0–11.9)
BUN SERPL-MCNC: 11 MG/DL (ref 8–22)
CALCIUM SERPL-MCNC: 9 MG/DL (ref 8.4–10.2)
CHLORIDE SERPL-SCNC: 106 MMOL/L (ref 96–112)
CO2 SERPL-SCNC: 22 MMOL/L (ref 20–33)
CREAT SERPL-MCNC: 0.76 MG/DL (ref 0.5–1.4)
GLUCOSE SERPL-MCNC: 103 MG/DL (ref 65–99)
POTASSIUM SERPL-SCNC: 3.6 MMOL/L (ref 3.6–5.5)
SODIUM SERPL-SCNC: 134 MMOL/L (ref 135–145)

## 2018-12-15 PROCEDURE — 700111 HCHG RX REV CODE 636 W/ 250 OVERRIDE (IP): Performed by: HOSPITALIST

## 2018-12-15 PROCEDURE — 36415 COLL VENOUS BLD VENIPUNCTURE: CPT

## 2018-12-15 PROCEDURE — 80048 BASIC METABOLIC PNL TOTAL CA: CPT

## 2018-12-15 PROCEDURE — 770020 HCHG ROOM/CARE - TELE (206)

## 2018-12-15 PROCEDURE — 665998 HH PPS REVENUE CREDIT

## 2018-12-15 PROCEDURE — 99232 SBSQ HOSP IP/OBS MODERATE 35: CPT | Performed by: HOSPITALIST

## 2018-12-15 PROCEDURE — 665999 HH PPS REVENUE DEBIT

## 2018-12-15 RX ADMIN — HYDROMORPHONE HYDROCHLORIDE 1 MG: 1 INJECTION, SOLUTION INTRAMUSCULAR; INTRAVENOUS; SUBCUTANEOUS at 10:27

## 2018-12-15 RX ADMIN — HEPARIN SODIUM 5000 UNITS: 5000 INJECTION, SOLUTION INTRAVENOUS; SUBCUTANEOUS at 06:35

## 2018-12-15 RX ADMIN — HYDROMORPHONE HYDROCHLORIDE 1 MG: 1 INJECTION, SOLUTION INTRAMUSCULAR; INTRAVENOUS; SUBCUTANEOUS at 06:30

## 2018-12-15 RX ADMIN — ONDANSETRON 4 MG: 2 INJECTION INTRAMUSCULAR; INTRAVENOUS at 06:29

## 2018-12-15 RX ADMIN — HYDROMORPHONE HYDROCHLORIDE 1 MG: 1 INJECTION, SOLUTION INTRAMUSCULAR; INTRAVENOUS; SUBCUTANEOUS at 15:22

## 2018-12-15 RX ADMIN — ONDANSETRON 4 MG: 2 INJECTION INTRAMUSCULAR; INTRAVENOUS at 23:59

## 2018-12-15 RX ADMIN — FLUCONAZOLE 200 MG: 2 INJECTION, SOLUTION INTRAVENOUS at 06:32

## 2018-12-15 RX ADMIN — ONDANSETRON 4 MG: 2 INJECTION INTRAMUSCULAR; INTRAVENOUS at 15:22

## 2018-12-15 RX ADMIN — ONDANSETRON 4 MG: 2 INJECTION INTRAMUSCULAR; INTRAVENOUS at 10:27

## 2018-12-15 RX ADMIN — HYDROMORPHONE HYDROCHLORIDE 1 MG: 1 INJECTION, SOLUTION INTRAMUSCULAR; INTRAVENOUS; SUBCUTANEOUS at 19:48

## 2018-12-15 RX ADMIN — ONDANSETRON 4 MG: 2 INJECTION INTRAMUSCULAR; INTRAVENOUS at 19:48

## 2018-12-15 RX ADMIN — ONDANSETRON 4 MG: 2 INJECTION INTRAMUSCULAR; INTRAVENOUS at 02:06

## 2018-12-15 RX ADMIN — HYDROMORPHONE HYDROCHLORIDE 1 MG: 1 INJECTION, SOLUTION INTRAMUSCULAR; INTRAVENOUS; SUBCUTANEOUS at 02:07

## 2018-12-15 ASSESSMENT — PAIN SCALES - GENERAL
PAINLEVEL_OUTOF10: 7
PAINLEVEL_OUTOF10: 9
PAINLEVEL_OUTOF10: 5
PAINLEVEL_OUTOF10: 8
PAINLEVEL_OUTOF10: 6
PAINLEVEL_OUTOF10: 8
PAINLEVEL_OUTOF10: 7

## 2018-12-15 ASSESSMENT — PATIENT HEALTH QUESTIONNAIRE - PHQ9
1. LITTLE INTEREST OR PLEASURE IN DOING THINGS: NOT AT ALL
SUM OF ALL RESPONSES TO PHQ9 QUESTIONS 1 AND 2: 0
2. FEELING DOWN, DEPRESSED, IRRITABLE, OR HOPELESS: NOT AT ALL

## 2018-12-15 ASSESSMENT — ENCOUNTER SYMPTOMS
HEARTBURN: 0
VOMITING: 1
ABDOMINAL PAIN: 1
CONSTIPATION: 0
BLOOD IN STOOL: 0
NAUSEA: 1
DIARRHEA: 0

## 2018-12-15 NOTE — PROGRESS NOTES
1900- bedside report, assumed care of pt, discussed poc for shift and safety precautions, pt verbalized understanding to call RN for assistance prior to ambulation or for SOB, chest pain or any further needs. VSS, reports progress with tolerating diet but still with nausea. Ileostomy care performed by patient independently. Will continue rounding

## 2018-12-15 NOTE — PROGRESS NOTES
Telemetry Shift Summary    Rhythm SR  HR Range 76-90  Ectopy None  Measurements 0.14/0.08/0.36        Normal Values  Rhythm SR  HR Range    Measurements 0.12-0.20 / 0.06-0.10  / 0.30-0.52

## 2018-12-15 NOTE — CARE PLAN
Problem: Nutritional:  Goal: Achieve adequate nutritional intake  Patient will consume >50% of meals, supplements and snacks.   Outcome: PROGRESSING AS EXPECTED

## 2018-12-15 NOTE — PROGRESS NOTES
"Received report from NOC RN; assumed pt care. Pt A&Ox4, sitting up in bed. Pt states 8/10 abdominal pain. States \"my stomach is growing, it woke up!\" Pt requesting IV dilaudid and zofran Q4, states she would like to try to \"space it.\" Liborio jimenez ordered per pt request. Pt notified to call for assistance.   "

## 2018-12-15 NOTE — CARE PLAN
Problem: Discharge Barriers/Planning  Goal: Patient's continuum of care needs will be met  Outcome: PROGRESSING AS EXPECTED  Updated pt on POC    Problem: Pain Management  Goal: Pain level will decrease to patient's comfort goal  Outcome: PROGRESSING AS EXPECTED  Medicated per MAR.

## 2018-12-15 NOTE — PROGRESS NOTES
Hospital Medicine Daily Progress Note    Date of Service  12/14/2018    Chief Complaint  63 y.o. female admitted 12/12/2018 with severe weakness and inability to eat.    Hospital Course    63 y.o. female who presented 12/12/2018 with weakness. She has a complex history of Crohn's s/p a total colectomy and several subsequent ostomy revisions in the past. She was discharged a week ago with poor oral intake and persistent poor ostomy output that she says is just a small squirt/stream at times. She is unable to keep anything down. She says she has a a low grade temp once with home health of 100.4 but no other fevers. Home health sent her in today as she  Was not doing well at home becoming weaker daily.          Interval Problem Update  Patient is asking for Dilaudid more frequently, every 2 hours.  Explained to patient that actually I was going to decrease the frequency of her pain medication to every 4 hours and coordinate with Zofran.  She did get a little irritated with me but ultimately has agreed to this plan.  She is pleasant smiling and speaking in full sentences.    Consultants/Specialty  Colorectal surgery    Code Status  Full code    Disposition  To be determined    Review of Systems  Review of Systems   Constitutional: Negative.  Negative for chills, fever, malaise/fatigue and weight loss.   HENT: Negative.    Respiratory: Negative.  Negative for cough and shortness of breath.    Cardiovascular: Negative.  Negative for chest pain and leg swelling.   Gastrointestinal: Positive for abdominal pain, nausea and vomiting.   Genitourinary: Negative.  Negative for dysuria and flank pain.   Musculoskeletal: Negative.  Negative for back pain and myalgias.   Neurological: Negative.  Negative for dizziness, loss of consciousness and weakness.   Endo/Heme/Allergies: Negative.  Does not bruise/bleed easily.   Psychiatric/Behavioral: Negative.  Negative for depression. The patient is not nervous/anxious.    All other  systems reviewed and are negative.       Physical Exam  Temp:  [36.5 °C (97.7 °F)-36.8 °C (98.3 °F)] 36.6 °C (97.8 °F)  Pulse:  [79-91] 86  Resp:  [16-18] 18  BP: (129-143)/(54-70) 140/54    Physical Exam   Constitutional: She appears well-developed and well-nourished. No distress.   Plan of care discussed with bedside RN.   HENT:   Nose: Nose normal.   Mouth/Throat: Oropharynx is clear and moist. No oropharyngeal exudate.   Eyes: Conjunctivae are normal. Right eye exhibits no discharge. Left eye exhibits no discharge. No scleral icterus.   Neck: No JVD present. No tracheal deviation present.   Cardiovascular: Normal rate, regular rhythm and normal heart sounds.    Pulmonary/Chest: Effort normal and breath sounds normal. No stridor. No respiratory distress. She has no wheezes. She has no rales. She exhibits no tenderness.   Abdominal: Soft. Bowel sounds are normal. She exhibits no distension and no mass. There is tenderness. There is guarding. There is no rebound.   Ileostomy present left lower quadrant.   Musculoskeletal: She exhibits no edema or tenderness.   Neurological: She is alert. No cranial nerve deficit. She exhibits normal muscle tone.   Skin: Skin is warm and dry. She is not diaphoretic. No pallor.   Psychiatric: She has a normal mood and affect. Her behavior is normal.   Nursing note and vitals reviewed.      Fluids    Intake/Output Summary (Last 24 hours) at 12/14/18 1654  Last data filed at 12/14/18 1200   Gross per 24 hour   Intake              540 ml   Output                0 ml   Net              540 ml       Laboratory  Recent Labs      12/12/18   1505  12/13/18   0538  12/14/18   0455   WBC  20.3*  19.6*  10.7   RBC  6.38*  4.93  3.81*   HEMOGLOBIN  18.9*  14.6  11.3*   HEMATOCRIT  55.2*  43.7  35.2*   MCV  86.7  88.6  92.4   MCH  29.5  29.6  29.7   MCHC  34.0  33.4*  32.1*   RDW  38.0  39.8  43.0   PLATELETCT  248  196  145*   MPV  11.7  11.8  12.2     Recent Labs      12/12/18   1505   12/13/18   0538  12/14/18   0455   SODIUM  131*  136  136   POTASSIUM  3.7  3.0*  3.7   CHLORIDE  93*  101  111   CO2  25  26  21   GLUCOSE  114*  105*  107*   BUN  25*  21  11   CREATININE  1.49*  1.19  0.78   CALCIUM  10.8*  9.5  8.8         Recent Labs      12/12/18   1505   BNPBTYPENAT  478*           Imaging  CT-ABDOMEN-PELVIS WITH   Final Result      Status post colon resection. Left lower quadrant ileostomy.   No evidence of small bowel obstruction. No mesenteric inflammatory changes or free fluid identified.   No hydronephrosis.      DX-CHEST-PORTABLE (1 VIEW)   Final Result      No radiographic evidence of acute cardiopulmonary process.           Assessment/Plan  Crohn's disease of small intestine with complication (HCC)- (present on admission)   Assessment & Plan    No flare, there appears to be possible stricture associated with her ostomy.       Hypokalemia   Assessment & Plan    Symptomatic with hand cramps and foot cramps.  IV potassium chloride 40 mEq over 4 hours and recheck BMP, in a.m.  12/14: improved up to 3.7 bmp in am     Thrush of mouth and esophagus (HCC)   Assessment & Plan    12/13: She has been unable to tolerate nystatin swish and swallow.  Patient having recurrent episodes of regurgitated food that does not pass to the esophagus.  Start treatment with Diflucan 200 milligrams IV daily, if she does not improve then will get upper GI series.  12/14: Improved continue Diflucan 200 mg IV daily until she can keep down food adequately, she is starting to tolerate some soft foods.     Leukocytosis   Assessment & Plan    12/13:Improved slightly, no fever or signs of infection.  She does have thrush which could cause some leukocytosis and I will treat this with Diflucan.  12/14: Now resolved continue Diflucan thrush improving       Liver enzyme elevation   Assessment & Plan    Resolved possibly related to dehydration.  Continue to monitor.  On IV fluids.     ARF (acute renal failure) (HCC)    Assessment & Plan    Pre renal Improving with fluids, continue to monitor her metabolic panel and continue fluids overnight.  Treat dysphagia which I think is from thrush.  Avoid nephrotoxins  12/14: resolved with fluids, hold ivf and bmp in am     Hyponatremia   Assessment & Plan    12/14: Secondary to volume depletion, improved with IV fluids.  Stop fluids and observe overnight, bmp in am     Dehydration   Assessment & Plan    Severe.  Improved continue IV fluids.     Paroxysmal atrial fibrillation (HCC)- (present on admission)   Assessment & Plan    In nsr. Trend on tele.     Continue BB as tolerated.   Not on anticoagulation likely 2/2 gi bleeding in past.      COPD (chronic obstructive pulmonary disease) (HCC)- (present on admission)   Assessment & Plan    No flare. Rt protocol.      Ileostomy in place ([facility])- (present on admission)   Assessment & Plan    12/13:Discussed with Dr. Nunez, appreciate consultation.  She appears to have a distal stricture and could use a conservative surgical revision of her ileostomy, this will be scheduled on an outpatient basis.  Her output from the ostomy today has been adequate, we are treating thrush which seems to be her main issue and keeping food down at this point.  12/14: ostomy output adequate, pain control adequate     GERD (gastroesophageal reflux disease)- (present on admission)   Assessment & Plan    Consider iv meds but no severe sx at present.      Chronic pain- (present on admission)   Assessment & Plan    IV Dilaudid every 3 hours as needed for severe pain.  Treat thrush and resume home oral pain medications when able.  12/14: decrease frequency of iv dilaudid to every 4 hours and give with zofran, improving flare            VTE prophylaxis: Heparin

## 2018-12-15 NOTE — PROGRESS NOTES
Telemetry Shift Summary    Rhythm SR/ST  HR Range 90s-110s  Ectopy fPVC  Measurements 0.18/0.08/0.38        Normal Values  Rhythm SR  HR Range    Measurements 0.12-0.20 / 0.06-0.10  / 0.30-0.52

## 2018-12-15 NOTE — ASSESSMENT & PLAN NOTE
Hemoglobin high on admission at 18 went to 14.6  is now down to 11.3 with no signs of bleeding seen, very dehydrated on admission.  Platelets also slightly low, hold heparin, stop fluids, CBC in am.

## 2018-12-15 NOTE — PROGRESS NOTES
Pt is A&Ox4. Ambulates with walker and 1 assist. Pt self cares for her ileostomy. Pt c/o nausea and pain, medicated with zofran and dilaudid per MAR, pain meds adjusted today. Pt uses call light appropriately, non-skid socks on, belongings within reach, bed locked and in lowest position, hourly rounding in place.

## 2018-12-16 VITALS
BODY MASS INDEX: 23.89 KG/M2 | SYSTOLIC BLOOD PRESSURE: 112 MMHG | TEMPERATURE: 97.5 F | HEART RATE: 82 BPM | WEIGHT: 176.37 LBS | HEIGHT: 72 IN | RESPIRATION RATE: 18 BRPM | DIASTOLIC BLOOD PRESSURE: 68 MMHG | OXYGEN SATURATION: 94 %

## 2018-12-16 LAB
ANION GAP SERPL CALC-SCNC: 8 MMOL/L (ref 0–11.9)
BASOPHILS # BLD AUTO: 0.6 % (ref 0–1.8)
BASOPHILS # BLD: 0.05 K/UL (ref 0–0.12)
BUN SERPL-MCNC: 10 MG/DL (ref 8–22)
CALCIUM SERPL-MCNC: 9.7 MG/DL (ref 8.4–10.2)
CHLORIDE SERPL-SCNC: 105 MMOL/L (ref 96–112)
CO2 SERPL-SCNC: 22 MMOL/L (ref 20–33)
CREAT SERPL-MCNC: 0.98 MG/DL (ref 0.5–1.4)
EOSINOPHIL # BLD AUTO: 0.24 K/UL (ref 0–0.51)
EOSINOPHIL NFR BLD: 2.7 % (ref 0–6.9)
ERYTHROCYTE [DISTWIDTH] IN BLOOD BY AUTOMATED COUNT: 41.4 FL (ref 35.9–50)
GLUCOSE SERPL-MCNC: 121 MG/DL (ref 65–99)
HCT VFR BLD AUTO: 35.7 % (ref 37–47)
HGB BLD-MCNC: 11.8 G/DL (ref 12–16)
IMM GRANULOCYTES # BLD AUTO: 0.08 K/UL (ref 0–0.11)
IMM GRANULOCYTES NFR BLD AUTO: 0.9 % (ref 0–0.9)
LYMPHOCYTES # BLD AUTO: 1.92 K/UL (ref 1–4.8)
LYMPHOCYTES NFR BLD: 21.7 % (ref 22–41)
MCH RBC QN AUTO: 30 PG (ref 27–33)
MCHC RBC AUTO-ENTMCNC: 33.1 G/DL (ref 33.6–35)
MCV RBC AUTO: 90.8 FL (ref 81.4–97.8)
MONOCYTES # BLD AUTO: 0.92 K/UL (ref 0–0.85)
MONOCYTES NFR BLD AUTO: 10.4 % (ref 0–13.4)
NEUTROPHILS # BLD AUTO: 5.65 K/UL (ref 2–7.15)
NEUTROPHILS NFR BLD: 63.7 % (ref 44–72)
NRBC # BLD AUTO: 0 K/UL
NRBC BLD-RTO: 0 /100 WBC
PLATELET # BLD AUTO: 167 K/UL (ref 164–446)
PMV BLD AUTO: 12.3 FL (ref 9–12.9)
POTASSIUM SERPL-SCNC: 3.9 MMOL/L (ref 3.6–5.5)
RBC # BLD AUTO: 3.93 M/UL (ref 4.2–5.4)
SODIUM SERPL-SCNC: 135 MMOL/L (ref 135–145)
WBC # BLD AUTO: 8.9 K/UL (ref 4.8–10.8)

## 2018-12-16 PROCEDURE — 85025 COMPLETE CBC W/AUTO DIFF WBC: CPT

## 2018-12-16 PROCEDURE — 700111 HCHG RX REV CODE 636 W/ 250 OVERRIDE (IP): Performed by: HOSPITALIST

## 2018-12-16 PROCEDURE — 665998 HH PPS REVENUE CREDIT

## 2018-12-16 PROCEDURE — 36415 COLL VENOUS BLD VENIPUNCTURE: CPT

## 2018-12-16 PROCEDURE — 80048 BASIC METABOLIC PNL TOTAL CA: CPT

## 2018-12-16 PROCEDURE — 665999 HH PPS REVENUE DEBIT

## 2018-12-16 PROCEDURE — 99239 HOSP IP/OBS DSCHRG MGMT >30: CPT | Performed by: HOSPITALIST

## 2018-12-16 RX ORDER — CLOTRIMAZOLE 10 MG/1
10 LOZENGE ORAL; TOPICAL
Qty: 35 TROCHE | Refills: 0 | Status: SHIPPED | OUTPATIENT
Start: 2018-12-16 | End: 2018-12-23

## 2018-12-16 RX ADMIN — ONDANSETRON 4 MG: 2 INJECTION INTRAMUSCULAR; INTRAVENOUS at 08:20

## 2018-12-16 RX ADMIN — ONDANSETRON 4 MG: 2 INJECTION INTRAMUSCULAR; INTRAVENOUS at 12:39

## 2018-12-16 RX ADMIN — HYDROMORPHONE HYDROCHLORIDE 1 MG: 1 INJECTION, SOLUTION INTRAMUSCULAR; INTRAVENOUS; SUBCUTANEOUS at 00:00

## 2018-12-16 RX ADMIN — HYDROMORPHONE HYDROCHLORIDE 1 MG: 1 INJECTION, SOLUTION INTRAMUSCULAR; INTRAVENOUS; SUBCUTANEOUS at 12:39

## 2018-12-16 RX ADMIN — ONDANSETRON 4 MG: 2 INJECTION INTRAMUSCULAR; INTRAVENOUS at 04:09

## 2018-12-16 RX ADMIN — FLUCONAZOLE 200 MG: 2 INJECTION, SOLUTION INTRAVENOUS at 06:05

## 2018-12-16 RX ADMIN — HYDROMORPHONE HYDROCHLORIDE 1 MG: 1 INJECTION, SOLUTION INTRAMUSCULAR; INTRAVENOUS; SUBCUTANEOUS at 08:20

## 2018-12-16 RX ADMIN — HYDROMORPHONE HYDROCHLORIDE 1 MG: 1 INJECTION, SOLUTION INTRAMUSCULAR; INTRAVENOUS; SUBCUTANEOUS at 04:10

## 2018-12-16 ASSESSMENT — PAIN SCALES - GENERAL
PAINLEVEL_OUTOF10: 7
PAINLEVEL_OUTOF10: 8
PAINLEVEL_OUTOF10: 9
PAINLEVEL_OUTOF10: 7

## 2018-12-16 NOTE — PROGRESS NOTES
Telemetry Shift Summary    Rhythm SR/ST  HR Range 60s-100s  Ectopy rPVC  Measurements 0.18/0.08/0.36        Normal Values  Rhythm SR  HR Range    Measurements 0.12-0.20 / 0.06-0.10  / 0.30-0.52

## 2018-12-16 NOTE — PROGRESS NOTES
Received report from day shift nurse. Assumed pt care.  Pt is A&Ox4, resting comfortably in bed. Pt on r.a.. No signs of SOB/respiratory distress. Labs noted, VSS. Pt c/o abd pain; given prn Dilaudid 1mg per MAR. Fall precautions in place. Assessment completed, pt had a colostomy to LLQ in place. Call light and personal belongings within reach. Continue to monitor

## 2018-12-16 NOTE — PROGRESS NOTES
Bedside report completed w/ Thu/RN.  Assumed pt care. Patient in bed, resting, in no apparent distress.  Safety precautions in place. Call light & personal belongings within reach.  Plan of care discussed.  Patient is on room air, IV running antibiotis.  Reports 7/10 pain, will medicate appropriately.  No needs at this time, will continue to monitor.

## 2018-12-16 NOTE — CARE PLAN
Problem: Knowledge Deficit  Goal: Knowledge of disease process/condition, treatment plan, diagnostic tests, and medications will improve  Outcome: PROGRESSING AS EXPECTED  Planning to D/C today on Home health     Problem: Mobility  Goal: Risk for activity intolerance will decrease    Intervention: Assess and monitor signs of activity intolerance   12/16/18 0519   OTHER   Assistance / Tolerance Standby Assist   Pt required standby assist with FWW for ambulation; pt ambulated in the hallway last night and tolerated well        Problem: Pain Management  Goal: Pain level will decrease to patient's comfort goal  Outcome: PROGRESSING AS EXPECTED   12/16/18 0400   OTHER   Pain Scale 0 - 10  7   Comfort Goal Comfort at Rest;Comfort with Movement;Sleep Comfortably   Pt c/o abd and generalized pain; given scheduled Dilaudid 1mg per MAR  Continued to medicate pain med around the clock to keep pain under control

## 2018-12-16 NOTE — PROGRESS NOTES
[facility] Medicine Daily Progress Note    Date of Service  12/15/2018    Chief Complaint  63 y.o. female admitted 12/12/2018 with severe weakness and inability to eat.    Hospital Course    63 y.o. female who presented 12/12/2018 with weakness. She has a complex history of Crohn's s/p a total colectomy and several subsequent ostomy revisions in the past. She was discharged a week ago with poor oral intake and persistent poor ostomy output that she says is just a small squirt/stream at times. She is unable to keep anything down. She says she has a a low grade temp once with home health of 100.4 but no other fevers. Home health sent her in today as she  Was not doing well at home becoming weaker daily.          Interval Problem Update  Abdominal pain is improving, labs are better. Vomiting improved.  Weakness improved but still using a walker.    Consultants/Specialty  Colorectal surgery    Code Status  Full code    Disposition  To be determined    Review of Systems  Review of Systems   Gastrointestinal: Positive for abdominal pain, nausea and vomiting. Negative for blood in stool, constipation, diarrhea, heartburn and melena.   All other systems reviewed and are negative.       Physical Exam  Temp:  [36.4 °C (97.6 °F)-36.7 °C (98.1 °F)] 36.7 °C (98.1 °F)  Pulse:  [76-90] 86  Resp:  [16-18] 18  BP: (111-136)/(55-70) 135/55    Physical Exam   Constitutional: She is oriented to person, place, and time. She appears well-developed and well-nourished. No distress.   HENT:   Head: Normocephalic and atraumatic.   Right Ear: External ear normal.   Left Ear: External ear normal.   Nose: Nose normal.   Eyes: Conjunctivae are normal. Right eye exhibits no discharge. Left eye exhibits no discharge. No scleral icterus.   Neck: No JVD present. No tracheal deviation present.   Cardiovascular: Normal rate and regular rhythm.    Pulmonary/Chest: Effort normal. No stridor. No respiratory distress.   Abdominal: Soft. She exhibits no  distension. There is tenderness.   Left lower quadrant ileostomy present   Musculoskeletal: She exhibits no edema or deformity.   Neurological: She is alert and oriented to person, place, and time.   Skin: Skin is warm and dry. She is not diaphoretic.   Psychiatric: She has a normal mood and affect. Her behavior is normal. Judgment and thought content normal.   Nursing note and vitals reviewed.      Fluids    Intake/Output Summary (Last 24 hours) at 12/15/18 1628  Last data filed at 12/15/18 1200   Gross per 24 hour   Intake              480 ml   Output              800 ml   Net             -320 ml       Laboratory  Recent Labs      12/13/18   0538  12/14/18   0455   WBC  19.6*  10.7   RBC  4.93  3.81*   HEMOGLOBIN  14.6  11.3*   HEMATOCRIT  43.7  35.2*   MCV  88.6  92.4   MCH  29.6  29.7   MCHC  33.4*  32.1*   RDW  39.8  43.0   PLATELETCT  196  145*   MPV  11.8  12.2     Recent Labs      12/13/18   0538  12/14/18   0455  12/15/18   0417   SODIUM  136  136  134*   POTASSIUM  3.0*  3.7  3.6   CHLORIDE  101  111  106   CO2  26  21  22   GLUCOSE  105*  107*  103*   BUN  21  11  11   CREATININE  1.19  0.78  0.76   CALCIUM  9.5  8.8  9.0                   Imaging  CT-ABDOMEN-PELVIS WITH   Final Result      Status post colon resection. Left lower quadrant ileostomy.   No evidence of small bowel obstruction. No mesenteric inflammatory changes or free fluid identified.   No hydronephrosis.      DX-CHEST-PORTABLE (1 VIEW)   Final Result      No radiographic evidence of acute cardiopulmonary process.           Assessment/Plan  Crohn's disease of small intestine with complication (HCC)- (present on admission)   Assessment & Plan    No flare, there appears to be possible stricture associated with her ostomy.  Patient complains of pain, no bleeding continue to monitor       Anemia   Assessment & Plan    Hemoglobin high on admission at 18 went to 14.6  is now down to 11.3 with no signs of bleeding seen, very dehydrated on  admission.  Platelets also slightly low, hold heparin, stop fluids, CBC in am.       Hypokalemia   Assessment & Plan    Symptomatic with hand cramps and foot cramps.  IV potassium chloride 40 mEq over 4 hours and recheck BMP, in a.m.  12/14: improved up to 3.7 bmp in am     Thrush of mouth and esophagus (HCC)   Assessment & Plan    12/13: She has been unable to tolerate nystatin swish and swallow.  Patient having recurrent episodes of regurgitated food that does not pass to the esophagus.  Start treatment with Diflucan 200 milligrams IV daily, if she does not improve then will get upper GI series.  12/14: Improved continue Diflucan 200 mg IV daily until she can keep down food adequately, she is starting to tolerate some soft foods.     Leukocytosis   Assessment & Plan    12/13:Improved slightly, no fever or signs of infection.  She does have thrush which could cause some leukocytosis and I will treat this with Diflucan.  12/14: Now resolved continue Diflucan thrush improving  12/15: continue diflucan cbc in am       Liver enzyme elevation   Assessment & Plan    Resolved possibly related to dehydration.  Continue to monitor.  On IV fluids.     ARF (acute renal failure) (HCC)   Assessment & Plan    Pre renal Improving with fluids, continue to monitor her metabolic panel and continue fluids overnight.  Treat dysphagia which I think is from thrush.  Avoid nephrotoxins  12/14: resolved with fluids, hold ivf and bmp in am  12/15: resolved.     Hyponatremia   Assessment & Plan    12/14: Secondary to volume depletion, improved with IV fluids.  Stop fluids and observe overnight, bmp in am  12/15: resolved, monitor bmp in am     Dehydration   Assessment & Plan    Resolved, off iv fluids and staying hydrated.     Paroxysmal atrial fibrillation (HCC)- (present on admission)   Assessment & Plan    In nsr. Trend on tele.     Continue BB as tolerated.   Not on anticoagulation likely 2/2 gi bleeding in past.      COPD (chronic  obstructive pulmonary disease) (HCC)- (present on admission)   Assessment & Plan    No flare. Rt protocol.      Ileostomy in place (HCC)- (present on admission)   Assessment & Plan    12/13:Discussed with Dr. Nunez, appreciate consultation.  She appears to have a distal stricture and could use a conservative surgical revision of her ileostomy, this will be scheduled on an outpatient basis.  Her output from the ostomy today has been adequate, we are treating thrush which seems to be her main issue and keeping food down at this point.  12/14: ostomy output adequate, pain control adequate  12/15: ostomy output continues to be adequate.     GERD (gastroesophageal reflux disease)- (present on admission)   Assessment & Plan    Consider iv meds but no severe sx at present.      Chronic pain- (present on admission)   Assessment & Plan    IV Dilaudid every 3 hours as needed for severe pain.  Treat thrush and resume home oral pain medications when able.  12/14: decrease frequency of iv dilaudid to every 4 hours and give with zofran, improving flare  12/15: improving, no change in pain medication today, anticipate discharge tomorrow.            VTE prophylaxis: Heparin

## 2018-12-16 NOTE — CARE PLAN
Problem: Communication  Goal: The ability to communicate needs accurately and effectively will improve  Outcome: PROGRESSING AS EXPECTED    Intervention: [place] patient and significant other/support system to call light to alert staff of needs  Patient oriented to surroundings and unit policies/routines.  Educated and understands the use of the call button.  Patient calls appropriately.      Problem: Safety  Goal: Will remain free from falls  Outcome: PROGRESSING AS EXPECTED    Intervention: Implement fall precautions  Patient educated and understands the fall precautions in place to prevent falls.  Bed alarm is off, patient calls appropriately, IV pole closest to bathroom, treaded slipper socks on, and bedrails closest to bathroom down.  Patient also educated and understands the use of the call button for any assistance with mobility.

## 2018-12-16 NOTE — DISCHARGE INSTRUCTIONS
Discharge Instructions    Discharged to home by car with relative. Discharged via wheelchair, hospital escort: Yes.  Special equipment needed: Walker    Be sure to schedule a follow-up appointment with your primary care doctor or any specialists as instructed.     Discharge Plan:   Diet Plan: Discussed  Activity Level: Discussed  Confirmed Follow up Appointment: Patient to Call and Schedule Appointment  Confirmed Symptoms Management: Discussed  Medication Reconciliation Updated: Yes  Influenza Vaccine Indication: Not indicated: Previously immunized this influenza season and > 8 years of age    I understand that a diet low in cholesterol, fat, and sodium is recommended for good health. Unless I have been given specific instructions below for another diet, I accept this instruction as my diet prescription.   Other diet: Regular dysphagia 2, thin liquids    Special Instructions: None    · Is patient discharged on Warfarin / Coumadin?   No     Depression / Suicide Risk    As you are discharged from this [facility] facility, it is important to learn how to keep safe from harming yourself.    Recognize the warning signs:  · Abrupt changes in personality, positive or negative- including increase in energy   · Giving away possessions  · Change in eating patterns- significant weight changes-  positive or negative  · Change in sleeping patterns- unable to sleep or sleeping all the time   · Unwillingness or inability to communicate  · Depression  · Unusual sadness, discouragement and loneliness  · Talk of wanting to die  · Neglect of personal appearance   · Rebelliousness- reckless behavior  · Withdrawal from people/activities they love  · Confusion- inability to concentrate     If you or a loved one observes any of these behaviors or has concerns about self-harm, here's what you can do:  · Talk about it- your feelings and reasons for harming yourself  · Remove any means that you might use to hurt yourself (examples: pills,  rope, extension cords, firearm)  · Get professional help from the community (Mental Health, Substance Abuse, psychological counseling)  · Do not be alone:Call your Safe Contact- someone whom you trust who will be there for you.  · Call your local CRISIS HOTLINE 266-5568 or 299-671-1743  · Call your local Children's Mobile Crisis Response Team [place] (523) 788-6865 or www.Tracked.com  · Call the toll free National Suicide Prevention Hotlines   · National Suicide Prevention Lifeline 311-932-LEUD (2860)  · National Hope Line Network 800-SUICIDE (023-4947)

## 2018-12-16 NOTE — DISCHARGE SUMMARY
Discharge Summary    CHIEF COMPLAINT ON ADMISSION  Chief Complaint   Patient presents with   • N/V       Reason for Admission  N/V     Admission Date  12/12/2018    CODE STATUS  Prior    HPI & HOSPITAL COURSE      63 y.o. female who presented 12/12/2018 with weakness. She has a complex history of Crohn's s/p a total colectomy and several subsequent ostomy revisions in the past. She was discharged a week ago with poor oral intake and persistent poor ostomy output that she says is just a small squirt/stream at times. She is unable to keep anything down. She says she has a a low grade temp once with home health of 100.4 but no other fevers. Home health sent her in today as she  Was not doing well at home becoming weaker daily.     She was found to be quite dehydrated on admission with hyponatremia and hypokalemia.  She has pinpoint opening to her ostomy.  I discussed the case with Dr. Nunez, she was going to see as an outpatient.  He does advise a revision surgery but wants to see her as an outpatient scheduled outpatient.  Patient has improved ostomy output during her hospital stay and is in agreement with this.  She does complain of a lot of pain and used Dilaudid intravenously during her hospital stay was able to be weaned off of this and was sent home on no pain medications.  In addition she was found to have a moderately severe case of oral and probably esophageal thrush, she was unable to tolerate the nystatin at home and therefore was put on IV Diflucan with improvement and now she is able to swallow and eat adequately.  She has been off IV fluids for 24 hours with stabilization of her electrolytes.  Patient will go home on clotrimazole troches and follow-up with colorectal surgery.    Therefore, she is discharged in good and stable condition to home with close outpatient follow-up.    The patient met 2-midnight criteria for an inpatient stay at the time of discharge.    Discharge Date  12/16/2018    FOLLOW UP  ITEMS POST DISCHARGE  Colorectal surgery Dr. Bennett Nunez.    DISCHARGE DIAGNOSES  Active Problems:    Crohn's disease of small intestine with complication (HCC) POA: Yes      Overview: Followed by GI Dr. Cruz      S/p ileostomy      Scope 5/2014-no strictures, fistulas, or new abscesses    Chronic pain POA: Yes      Overview: On multiple narcotics including high dose oxycodone and Dilaudid      Valium also on medication list    Opioid type dependence, continuous (CMS-[facility]) POA: Yes    GERD (gastroesophageal reflux disease) POA: Yes    Ileostomy in place ([facility]) POA: Yes    COPD (chronic obstructive pulmonary disease) ([facility]) POA: Yes      Overview: On oxygen at night    Paroxysmal atrial fibrillation ([facility]) POA: Yes    Dehydration POA: Unknown    Hyponatremia POA: Unknown    ARF (acute renal failure) ([facility]) POA: Unknown    Liver enzyme elevation POA: Unknown    Leukocytosis POA: Unknown    Thrush of mouth and esophagus (HCC) POA: Unknown    Hypokalemia POA: Unknown    Anemia POA: Unknown  Resolved Problems:    * No resolved hospital problems. *      FOLLOW UP  Future Appointments  Date Time Provider Department Center   12/19/2018 11:00 AM Amanda Bazzi M.D. [facility] None     Bennett Nunez M.D.  [address] [address]  [phone]    On 12/17/2018  Please call the office to verify time of appointment.     [address]  [facility] [address]  [phone]          MEDICATIONS ON DISCHARGE     Medication List      START taking these medications      Instructions   clotrimazole 10 MG Troc  Commonly known as:  MYCELEX   Take 1 Iman by mouth 5 Times a Day for 7 days.  Dose:  10 mg        CONTINUE taking these medications      Instructions   albuterol 108 (90 BASE) MCG/ACT Aers  Commonly known as:  VENTOLIN or PROVENTIL   Inhale 2 Puffs by mouth every 6 hours as needed.  Dose:  2 Puff     aspirin 81 MG Chew chewable tablet  Commonly known as:  ASA   Take 81 mg by mouth 1 time  daily as needed (thrombosis).  Dose:  81 mg     Azelastine HCl 137 MCG/SPRAY Soln   [place] 1 [place] in nose 2 times a day as needed (dry sinus).  Dose:  1 Spray     cetirizine 10 MG Tabs  Commonly known as:  ZYRTEC   Take 10 mg by mouth 1 time daily as needed for Allergies.  Dose:  10 mg     estradiol 0.1 MG/GM vaginal cream  Commonly known as:  ESTRACE   Insert 0.5 g in vagina every 72 hours.  Dose:  0.5 g     FUROSEMIDE PO   Take 1 Tab by mouth every 14 days.  Dose:  1 Tab     granisetron 1 MG Tabs  Commonly known as:  KYTRIL   Take 1 Tab by mouth every 12 hours as needed for Nausea/Vomiting.  Dose:  1 mg     ipratropium-albuterol 0.5-2.5 (3) MG/3ML nebulizer solution  Commonly known as:  DUONEB   3 mL by Nebulization route 4 times a day as needed for Shortness of Breath.  Dose:  3 mL     Magnesium 100 MG Tabs   Take 100 mg by mouth every 48 hours.  Dose:  100 mg     MAGNESIUM CITRATE PO   Take 1 Tab by mouth every 48 hours.  Dose:  1 Tab     metoprolol 25 MG Tabs  Commonly known as:  LOPRESSOR   Take 25 mg by mouth 2 times a day.  Dose:  25 mg     ondansetron 4 MG Tbdp  Commonly known as:  ZOFRAN ODT   Doctor's comments:  To replace all other rx's  Take 1 Tab by mouth every 6 hours as needed for Nausea.  Dose:  4 mg     oxyCODONE 20 MG/ML Conc   Take 30 mg by mouth every four hours as needed (pain). 20mg/ml solution  Dose:  30 mg     spironolactone 25 MG Tabs  Commonly known as:  ALDACTONE   Doctor's comments:  **Patient requests 90 days supply**  TAKE 2 TABLETS BY MOUTH TWICE DAILY            Allergies  Allergies   Allergen Reactions   • Azathioprine Sodium Hives   • Carafate [Sucralfate] Rash     Generalizes/Mouth Sores   • Infliximab Hives   • Morphine Swelling   • Roxanol [Morphine Sulfate] Swelling     Throat swells   • Amitriptyline Unspecified     Nightmares     • Demerol Vomiting   • Fentanyl Unspecified     Patches caused skin breakdown, no pain relief   • Lorazepam Unspecified     States give me  nightmares.    • Methadone    • Methotrexate Hives and Rash   • Pregabalin Rash     Rash     • Pseudoephedrine Vomiting   • Sulfa Drugs Hives   • Tape Rash     Skin peels off; paper tape   • Celestone [Betamethasone Sodium Phosphate] Unspecified     Pt unable to remember   • Sulfasalazine Unspecified     Pt unable to remember   • Tizanidine Hydrochloride Unspecified     Pt unable to remember       DIET  Soft diet.    ACTIVITY  As tolerated.  Weight bearing as tolerated    CONSULTATIONS  Colorectal surgery, Dr. Nunez.    PROCEDURES  None.    LABORATORY  Lab Results   Component Value Date    SODIUM 135 12/16/2018    POTASSIUM 3.9 12/16/2018    CHLORIDE 105 12/16/2018    CO2 22 12/16/2018    GLUCOSE 121 (H) 12/16/2018    BUN 10 12/16/2018    CREATININE 0.98 12/16/2018    CREATININE 0.89 02/10/2010    GLOMRATE >59 02/10/2010        Lab Results   Component Value Date    WBC 8.9 12/16/2018    WBC 7.9 02/10/2010    HEMOGLOBIN 11.8 (L) 12/16/2018    HEMATOCRIT 35.7 (L) 12/16/2018    PLATELETCT 167 12/16/2018        Total time of the discharge process exceeds 35 minutes.

## 2018-12-17 ENCOUNTER — TELEPHONE (OUTPATIENT)
Dept: INTERNAL MEDICINE | Facility: IMAGING CENTER | Age: 63
End: 2018-12-17

## 2018-12-17 PROCEDURE — 665998 HH PPS REVENUE CREDIT

## 2018-12-17 PROCEDURE — 665999 HH PPS REVENUE DEBIT

## 2018-12-17 NOTE — TELEPHONE ENCOUNTER
Jana called 12/17/18 at 11:35am to 11:49am  She called complaining that home care called and she missed the call because she doesn't answer any calls that does not say [facility] on her phone. Then she jumped subject saying another doctor told her that she has an appointment with Dr. Bazzi on 12/19/18, but she was not aware of it and said why did we (our office) not call and let her know.  I told her she would be getting a reminder call today for her appointment on 12/19/18 she again said if it doesn't say [facility] she doesn't answer the call. She then said why is it that she is now in the dark on everything about her care no one is letting her know when her appointments are. After she kept telling me that she does not know why she is coming into see Dr. Bazzi and no one has notified her I told her several times that the nurse at the hospital called and made a hospital f/v and it would of been on her discharge paperwork, she then said they did not read the discharge paperwork so she doesn't know anything about the appointment. 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 She kept bouncing back and forth from home health and then appointment she did not know she had with Dr. Bazzi and what is the appointment about and she can't get all the calls that come in because she has no strength. I told her again about the phone call reminders and then she started smacking th phone on something when I was trying to help her. I asked her why she did that and she said she did not have her tennis ball to squeeze because she was in pain then she gave the phone to her  because I explained about the hospital f/v with Dr Bazzi and the home care nurse will also be calling and I was not sure what the phone number will be that they are calling from. He wanted to cancel the appointment with Dr. Bazzi and wants Dr. Bazzi to call her.  I canceled  the appointment with Dr Bazzi and then Jana got back on the phone and told me everything again.  I told her I canceled her appointment with Dr. Bazzi and she started again with home care and her dead brain. She thinks everyone is against her and she doesn't understand why she is in the dark with her care. She would not let me get a word in after talking about everything over and over again she said she is done with this call.  I told her would have Dr. Bazzi call her.

## 2018-12-18 ENCOUNTER — HOME CARE VISIT (OUTPATIENT)
Dept: HOME HEALTH SERVICES | Facility: HOME HEALTHCARE | Age: 63
End: 2018-12-18
Payer: MEDICARE

## 2018-12-18 VITALS
BODY MASS INDEX: 21.7 KG/M2 | OXYGEN SATURATION: 97 % | RESPIRATION RATE: 18 BRPM | HEIGHT: 72 IN | SYSTOLIC BLOOD PRESSURE: 118 MMHG | HEART RATE: 105 BPM | DIASTOLIC BLOOD PRESSURE: 72 MMHG | WEIGHT: 160.2 LBS | TEMPERATURE: 98.9 F

## 2018-12-18 PROCEDURE — G0493 RN CARE EA 15 MIN HH/HOSPICE: HCPCS

## 2018-12-18 PROCEDURE — 665999 HH PPS REVENUE DEBIT

## 2018-12-18 PROCEDURE — 665998 HH PPS REVENUE CREDIT

## 2018-12-18 SDOH — ECONOMIC STABILITY: HOUSING INSECURITY: UNSAFE APPLIANCES: 0

## 2018-12-18 SDOH — ECONOMIC STABILITY: HOUSING INSECURITY: UNSAFE COOKING RANGE AREA: 0

## 2018-12-18 ASSESSMENT — ENCOUNTER SYMPTOMS
SHORTNESS OF BREATH: T
POOR JUDGMENT: 1
NAUSEA: DAILY
SEVERE DYSPNEA: 1
VOMITING: DAILY
MENTAL STATUS CHANGE: 0
DEBILITATING PAIN: 1

## 2018-12-18 ASSESSMENT — ACTIVITIES OF DAILY LIVING (ADL)
OASIS_M1830: 03
HOME_HEALTH_OASIS: 01

## 2018-12-18 ASSESSMENT — PATIENT HEALTH QUESTIONNAIRE - PHQ9
1. LITTLE INTEREST OR PLEASURE IN DOING THINGS: 00
2. FEELING DOWN, DEPRESSED, IRRITABLE, OR HOPELESS: 00

## 2018-12-19 ENCOUNTER — HOME CARE VISIT (OUTPATIENT)
Dept: HOME HEALTH SERVICES | Facility: HOME HEALTHCARE | Age: 63
End: 2018-12-19
Payer: MEDICARE

## 2018-12-19 ENCOUNTER — TELEPHONE (OUTPATIENT)
Dept: HEALTH INFORMATION MANAGEMENT | Facility: OTHER | Age: 63
End: 2018-12-19

## 2018-12-19 VITALS
SYSTOLIC BLOOD PRESSURE: 125 MMHG | OXYGEN SATURATION: 96 % | RESPIRATION RATE: 18 BRPM | HEART RATE: 102 BPM | DIASTOLIC BLOOD PRESSURE: 70 MMHG | TEMPERATURE: 98.2 F

## 2018-12-19 PROCEDURE — G0151 HHCP-SERV OF PT,EA 15 MIN: HCPCS

## 2018-12-19 PROCEDURE — 665998 HH PPS REVENUE CREDIT

## 2018-12-19 PROCEDURE — 665999 HH PPS REVENUE DEBIT

## 2018-12-19 SDOH — ECONOMIC STABILITY: HOUSING INSECURITY: UNSAFE COOKING RANGE AREA: 0

## 2018-12-19 SDOH — ECONOMIC STABILITY: HOUSING INSECURITY: UNSAFE APPLIANCES: 0

## 2018-12-19 ASSESSMENT — ACTIVITIES OF DAILY LIVING (ADL)
IADLS_COMMENTS: <!--EPICS-->PLEASE REFER TO OT EVALUATION.<BR><!--EPICE-->
ADLS_COMMENTS: <!--EPICS-->PLEASE REFER TO OT EVALUATION.<!--EPICE-->

## 2018-12-19 ASSESSMENT — ENCOUNTER SYMPTOMS
POOR JUDGMENT: 1
MENTAL STATUS CHANGE: 0
DIFFICULTY THINKING: 1
DEBILITATING PAIN: 1

## 2018-12-20 ENCOUNTER — HOME CARE VISIT (OUTPATIENT)
Dept: HOME HEALTH SERVICES | Facility: HOME HEALTHCARE | Age: 63
End: 2018-12-20
Payer: MEDICARE

## 2018-12-20 VITALS
TEMPERATURE: 99 F | SYSTOLIC BLOOD PRESSURE: 120 MMHG | RESPIRATION RATE: 18 BRPM | DIASTOLIC BLOOD PRESSURE: 54 MMHG | HEART RATE: 101 BPM | OXYGEN SATURATION: 94 %

## 2018-12-20 VITALS
OXYGEN SATURATION: 94 % | RESPIRATION RATE: 18 BRPM | DIASTOLIC BLOOD PRESSURE: 54 MMHG | SYSTOLIC BLOOD PRESSURE: 120 MMHG | HEART RATE: 101 BPM | TEMPERATURE: 99 F

## 2018-12-20 PROCEDURE — G0152 HHCP-SERV OF OT,EA 15 MIN: HCPCS

## 2018-12-20 PROCEDURE — 665999 HH PPS REVENUE DEBIT

## 2018-12-20 PROCEDURE — 665998 HH PPS REVENUE CREDIT

## 2018-12-20 PROCEDURE — G0153 HHCP-SVS OF S/L PATH,EA 15MN: HCPCS

## 2018-12-20 SDOH — ECONOMIC STABILITY: HOUSING INSECURITY: HOME SAFETY: PT LIVES WITH OVERLY ATTENTIVE SPOUSE IN  NEATLY KEPT, SINGLE STORY HOME WI SEVERAL ENTRY STEPS;

## 2018-12-20 ASSESSMENT — ACTIVITIES OF DAILY LIVING (ADL)
SHOPPING_ASSISTANCE: 6
TOILETING_EQUIPMENT_USED: GRAB BAR
BATHING_ASSISTIVE_EQUIPMENT_NEEDED: GRAB BARS
TOILETING_ASSISTANCE: 2
LAUNDRY_ASSISTANCE: 6
DRESSING_UB_ASSISTANCE: 5
BATHING_ASSISTIVE_EQUIPMENT_USED: SHOWER CHAIR, HHSH
EATING_ASSISTANCE: 1
BATHING_ASSISTANCE: 5
MEAL_PREP_ASSISTANCE: 6
HOUSEKEEPING_ASSISTANCE: 6
DRESSING_LB_ASSISTANCE: 5
TRANSPORTATION_ASSISTANCE: 6

## 2018-12-20 ASSESSMENT — ENCOUNTER SYMPTOMS
DEBILITATING PAIN: 1
MENTAL STATUS CHANGE: 0
POOR JUDGMENT: 1

## 2018-12-20 NOTE — TELEPHONE ENCOUNTER
Referral from [facility]. Med review completed. No clinically significant medication related issues noted.

## 2018-12-21 ENCOUNTER — HOME CARE VISIT (OUTPATIENT)
Dept: HOME HEALTH SERVICES | Facility: HOME HEALTHCARE | Age: 63
End: 2018-12-21
Payer: MEDICARE

## 2018-12-21 VITALS
RESPIRATION RATE: 18 BRPM | OXYGEN SATURATION: 94 % | HEART RATE: 102 BPM | TEMPERATURE: 97.9 F | SYSTOLIC BLOOD PRESSURE: 104 MMHG | DIASTOLIC BLOOD PRESSURE: 50 MMHG

## 2018-12-21 PROCEDURE — 665999 HH PPS REVENUE DEBIT

## 2018-12-21 PROCEDURE — G0493 RN CARE EA 15 MIN HH/HOSPICE: HCPCS

## 2018-12-21 PROCEDURE — 665998 HH PPS REVENUE CREDIT

## 2018-12-21 ASSESSMENT — ENCOUNTER SYMPTOMS: THOUGHT CONTENT - SUSPICIOUS: 1

## 2018-12-21 ASSESSMENT — ACTIVITIES OF DAILY LIVING (ADL): OASIS_M1830: 03

## 2018-12-22 PROCEDURE — 665999 HH PPS REVENUE DEBIT

## 2018-12-22 PROCEDURE — 665998 HH PPS REVENUE CREDIT

## 2018-12-23 PROCEDURE — 665999 HH PPS REVENUE DEBIT

## 2018-12-23 PROCEDURE — 665998 HH PPS REVENUE CREDIT

## 2018-12-24 ENCOUNTER — TELEPHONE (OUTPATIENT)
Dept: INTERNAL MEDICINE | Facility: IMAGING CENTER | Age: 63
End: 2018-12-24

## 2018-12-24 PROCEDURE — 665999 HH PPS REVENUE DEBIT

## 2018-12-24 PROCEDURE — 665998 HH PPS REVENUE CREDIT

## 2018-12-24 NOTE — TELEPHONE ENCOUNTER
"Jana is having ear pain.  She states that no one in Home Health will look in her ears.  I offered her an appointment and she declined at this time because of \"transportation issues\".  She then rambled on about how no one except Dr. Mahan will listen to her about her stoma and how constricted it is and how she can't get much nutrition in.  I told her that we would be happy to see her to discuss her issues and if it gets worse she needs to go back to the ER to be treated.  Also that she needs to follow up with GI about the continuing problem.  "

## 2018-12-25 PROCEDURE — 665999 HH PPS REVENUE DEBIT

## 2018-12-25 PROCEDURE — 665998 HH PPS REVENUE CREDIT

## 2018-12-26 ENCOUNTER — HOME CARE VISIT (OUTPATIENT)
Dept: HOME HEALTH SERVICES | Facility: HOME HEALTHCARE | Age: 63
End: 2018-12-26
Payer: MEDICARE

## 2018-12-26 ENCOUNTER — TELEPHONE (OUTPATIENT)
Dept: INTERNAL MEDICINE | Facility: IMAGING CENTER | Age: 63
End: 2018-12-26

## 2018-12-26 PROCEDURE — 665998 HH PPS REVENUE CREDIT

## 2018-12-26 PROCEDURE — 665999 HH PPS REVENUE DEBIT

## 2018-12-26 PROCEDURE — G0151 HHCP-SERV OF PT,EA 15 MIN: HCPCS

## 2018-12-27 PROCEDURE — 665998 HH PPS REVENUE CREDIT

## 2018-12-27 PROCEDURE — 665999 HH PPS REVENUE DEBIT

## 2018-12-27 NOTE — TELEPHONE ENCOUNTER
Jana called and when I returned her call she was very angry.  She states she hates [facility] and she hates home health and she does not want to come to my office anymore , less than 10 minutes she states she is lost all yasemin.  She states she has lost yasemin in me as a doctor.  She does not understand why she had to see .  She rambled about her last hospital stay complaining about the nurses, the fact that she had to share a room, the fact that she was not able to take a shower, the fact that there was no privacy.  She rambled about her  being selfish and not being able to take care of her.    It was difficult to get a word in.  I explained to her the reason for home health was because she was very weak.  I explained to her the reason for her hospitalizations.  She was dehydrated.    She refused the  today.  I reviewed the notes from her hospitalizations.    She states she does not want to continue getting any [facility].  She has decided to go to [facility] for all future care.    This was not the first episode of Jana expressing anger.  I told her I felt this was reasonable considering her anger toward anything and everything [facility]..  I told her I will plan on providing her medical care for the next 30 days and that she should establish with the [facility] system within that time.

## 2018-12-28 ENCOUNTER — APPOINTMENT (OUTPATIENT)
Dept: RADIOLOGY | Facility: MEDICAL CENTER | Age: 63
DRG: 330 | End: 2018-12-28
Attending: EMERGENCY MEDICINE
Payer: MEDICARE

## 2018-12-28 ENCOUNTER — HOME CARE VISIT (OUTPATIENT)
Dept: HOME HEALTH SERVICES | Facility: HOME HEALTHCARE | Age: 63
End: 2018-12-28
Payer: MEDICARE

## 2018-12-28 ENCOUNTER — HOSPITAL ENCOUNTER (INPATIENT)
Facility: MEDICAL CENTER | Age: 63
LOS: 11 days | DRG: 330 | End: 2019-01-08
Attending: EMERGENCY MEDICINE | Admitting: HOSPITALIST
Payer: MEDICARE

## 2018-12-28 DIAGNOSIS — R10.9 ABDOMINAL PAIN, UNSPECIFIED ABDOMINAL LOCATION: ICD-10-CM

## 2018-12-28 PROBLEM — R11.15 INTRACTABLE CYCLICAL VOMITING WITH NAUSEA: Status: ACTIVE | Noted: 2018-12-28

## 2018-12-28 PROBLEM — K94.13: Status: ACTIVE | Noted: 2018-12-28

## 2018-12-28 PROBLEM — R47.02 DYSPHASIA: Status: ACTIVE | Noted: 2018-12-28

## 2018-12-28 LAB
ALBUMIN SERPL BCP-MCNC: 4.2 G/DL (ref 3.2–4.9)
ALBUMIN/GLOB SERPL: 1.2 G/DL
ALP SERPL-CCNC: 105 U/L (ref 30–99)
ALT SERPL-CCNC: 34 U/L (ref 2–50)
ANION GAP SERPL CALC-SCNC: 12 MMOL/L (ref 0–11.9)
AST SERPL-CCNC: 22 U/L (ref 12–45)
BASOPHILS # BLD AUTO: 0.4 % (ref 0–1.8)
BASOPHILS # BLD: 0.05 K/UL (ref 0–0.12)
BILIRUB SERPL-MCNC: 0.5 MG/DL (ref 0.1–1.5)
BUN SERPL-MCNC: 32 MG/DL (ref 8–22)
CALCIUM SERPL-MCNC: 10.5 MG/DL (ref 8.5–10.5)
CHLORIDE SERPL-SCNC: 102 MMOL/L (ref 96–112)
CO2 SERPL-SCNC: 21 MMOL/L (ref 20–33)
CREAT SERPL-MCNC: 1.1 MG/DL (ref 0.5–1.4)
EOSINOPHIL # BLD AUTO: 0.03 K/UL (ref 0–0.51)
EOSINOPHIL NFR BLD: 0.2 % (ref 0–6.9)
ERYTHROCYTE [DISTWIDTH] IN BLOOD BY AUTOMATED COUNT: 44.8 FL (ref 35.9–50)
GLOBULIN SER CALC-MCNC: 3.6 G/DL (ref 1.9–3.5)
GLUCOSE SERPL-MCNC: 109 MG/DL (ref 65–99)
HCT VFR BLD AUTO: 39.6 % (ref 37–47)
HGB BLD-MCNC: 13 G/DL (ref 12–16)
IMM GRANULOCYTES # BLD AUTO: 0.07 K/UL (ref 0–0.11)
IMM GRANULOCYTES NFR BLD AUTO: 0.6 % (ref 0–0.9)
LIPASE SERPL-CCNC: 20 U/L (ref 11–82)
LYMPHOCYTES # BLD AUTO: 0.93 K/UL (ref 1–4.8)
LYMPHOCYTES NFR BLD: 7.4 % (ref 22–41)
MCH RBC QN AUTO: 30.4 PG (ref 27–33)
MCHC RBC AUTO-ENTMCNC: 32.8 G/DL (ref 33.6–35)
MCV RBC AUTO: 92.5 FL (ref 81.4–97.8)
MONOCYTES # BLD AUTO: 0.63 K/UL (ref 0–0.85)
MONOCYTES NFR BLD AUTO: 5 % (ref 0–13.4)
NEUTROPHILS # BLD AUTO: 10.94 K/UL (ref 2–7.15)
NEUTROPHILS NFR BLD: 86.4 % (ref 44–72)
NRBC # BLD AUTO: 0 K/UL
NRBC BLD-RTO: 0 /100 WBC
PLATELET # BLD AUTO: 213 K/UL (ref 164–446)
PMV BLD AUTO: 10.6 FL (ref 9–12.9)
POTASSIUM SERPL-SCNC: 4.1 MMOL/L (ref 3.6–5.5)
PROT SERPL-MCNC: 7.8 G/DL (ref 6–8.2)
RBC # BLD AUTO: 4.28 M/UL (ref 4.2–5.4)
SODIUM SERPL-SCNC: 135 MMOL/L (ref 135–145)
WBC # BLD AUTO: 12.7 K/UL (ref 4.8–10.8)

## 2018-12-28 PROCEDURE — 770006 HCHG ROOM/CARE - MED/SURG/GYN SEMI*

## 2018-12-28 PROCEDURE — 665998 HH PPS REVENUE CREDIT

## 2018-12-28 PROCEDURE — 96375 TX/PRO/DX INJ NEW DRUG ADDON: CPT

## 2018-12-28 PROCEDURE — 85025 COMPLETE CBC W/AUTO DIFF WBC: CPT

## 2018-12-28 PROCEDURE — 36415 COLL VENOUS BLD VENIPUNCTURE: CPT

## 2018-12-28 PROCEDURE — 700111 HCHG RX REV CODE 636 W/ 250 OVERRIDE (IP): Performed by: HOSPITALIST

## 2018-12-28 PROCEDURE — 304561 HCHG STAT O2

## 2018-12-28 PROCEDURE — 83690 ASSAY OF LIPASE: CPT

## 2018-12-28 PROCEDURE — 99223 1ST HOSP IP/OBS HIGH 75: CPT | Mod: AI | Performed by: HOSPITALIST

## 2018-12-28 PROCEDURE — 80053 COMPREHEN METABOLIC PANEL: CPT

## 2018-12-28 PROCEDURE — 96374 THER/PROPH/DIAG INJ IV PUSH: CPT

## 2018-12-28 PROCEDURE — 700105 HCHG RX REV CODE 258: Performed by: EMERGENCY MEDICINE

## 2018-12-28 PROCEDURE — 99285 EMERGENCY DEPT VISIT HI MDM: CPT

## 2018-12-28 PROCEDURE — 665999 HH PPS REVENUE DEBIT

## 2018-12-28 PROCEDURE — 700111 HCHG RX REV CODE 636 W/ 250 OVERRIDE (IP)

## 2018-12-28 PROCEDURE — 700111 HCHG RX REV CODE 636 W/ 250 OVERRIDE (IP): Performed by: EMERGENCY MEDICINE

## 2018-12-28 RX ORDER — SODIUM CHLORIDE 9 MG/ML
INJECTION, SOLUTION INTRAVENOUS CONTINUOUS
Status: DISCONTINUED | OUTPATIENT
Start: 2018-12-28 | End: 2018-12-29

## 2018-12-28 RX ORDER — OXYCODONE HYDROCHLORIDE 5 MG/1
2.5 TABLET ORAL
Status: DISCONTINUED | OUTPATIENT
Start: 2018-12-28 | End: 2018-12-28

## 2018-12-28 RX ORDER — AMOXICILLIN 250 MG
2 CAPSULE ORAL 2 TIMES DAILY
Status: DISCONTINUED | OUTPATIENT
Start: 2018-12-28 | End: 2019-01-08 | Stop reason: HOSPADM

## 2018-12-28 RX ORDER — ACETAMINOPHEN 325 MG/1
650 TABLET ORAL EVERY 6 HOURS PRN
Status: DISCONTINUED | OUTPATIENT
Start: 2018-12-28 | End: 2019-01-08 | Stop reason: HOSPADM

## 2018-12-28 RX ORDER — BISACODYL 10 MG
10 SUPPOSITORY, RECTAL RECTAL
Status: DISCONTINUED | OUTPATIENT
Start: 2018-12-28 | End: 2019-01-08 | Stop reason: HOSPADM

## 2018-12-28 RX ORDER — SPIRONOLACTONE 25 MG/1
50 TABLET ORAL DAILY
Status: ON HOLD | COMMUNITY
End: 2019-01-08

## 2018-12-28 RX ORDER — AZELASTINE HYDROCHLORIDE 137 UG/1
1 SPRAY, METERED NASAL 2 TIMES DAILY PRN
Status: DISCONTINUED | OUTPATIENT
Start: 2018-12-28 | End: 2018-12-28

## 2018-12-28 RX ORDER — ONDANSETRON 2 MG/ML
4 INJECTION INTRAMUSCULAR; INTRAVENOUS EVERY 4 HOURS PRN
Status: DISCONTINUED | OUTPATIENT
Start: 2018-12-28 | End: 2019-01-08 | Stop reason: HOSPADM

## 2018-12-28 RX ORDER — HALOPERIDOL 5 MG/ML
5 INJECTION INTRAMUSCULAR ONCE
Status: COMPLETED | OUTPATIENT
Start: 2018-12-28 | End: 2018-12-28

## 2018-12-28 RX ORDER — ONDANSETRON 2 MG/ML
4 INJECTION INTRAMUSCULAR; INTRAVENOUS ONCE
Status: COMPLETED | OUTPATIENT
Start: 2018-12-28 | End: 2018-12-28

## 2018-12-28 RX ORDER — CETIRIZINE HYDROCHLORIDE 10 MG/1
10 TABLET ORAL
Status: DISCONTINUED | OUTPATIENT
Start: 2018-12-28 | End: 2019-01-08 | Stop reason: HOSPADM

## 2018-12-28 RX ORDER — TORSEMIDE 10 MG/1
10 TABLET ORAL DAILY
Status: ON HOLD | COMMUNITY
End: 2019-01-08

## 2018-12-28 RX ORDER — POLYETHYLENE GLYCOL 3350 17 G/17G
1 POWDER, FOR SOLUTION ORAL
Status: DISCONTINUED | OUTPATIENT
Start: 2018-12-28 | End: 2019-01-08 | Stop reason: HOSPADM

## 2018-12-28 RX ORDER — SODIUM CHLORIDE 9 MG/ML
1000 INJECTION, SOLUTION INTRAVENOUS ONCE
Status: COMPLETED | OUTPATIENT
Start: 2018-12-28 | End: 2018-12-28

## 2018-12-28 RX ORDER — HYDROMORPHONE HYDROCHLORIDE 1 MG/ML
0.25 INJECTION, SOLUTION INTRAMUSCULAR; INTRAVENOUS; SUBCUTANEOUS
Status: DISCONTINUED | OUTPATIENT
Start: 2018-12-28 | End: 2019-01-08 | Stop reason: HOSPADM

## 2018-12-28 RX ORDER — HYDROMORPHONE HYDROCHLORIDE 1 MG/ML
1 INJECTION, SOLUTION INTRAMUSCULAR; INTRAVENOUS; SUBCUTANEOUS ONCE
Status: COMPLETED | OUTPATIENT
Start: 2018-12-28 | End: 2018-12-28

## 2018-12-28 RX ORDER — ALBUTEROL SULFATE 90 UG/1
2 AEROSOL, METERED RESPIRATORY (INHALATION) EVERY 6 HOURS PRN
Status: DISCONTINUED | OUTPATIENT
Start: 2018-12-28 | End: 2019-01-08 | Stop reason: HOSPADM

## 2018-12-28 RX ORDER — ASPIRIN 81 MG/1
81 TABLET, CHEWABLE ORAL DAILY
Status: DISCONTINUED | OUTPATIENT
Start: 2018-12-28 | End: 2018-12-31

## 2018-12-28 RX ORDER — OXYCODONE HYDROCHLORIDE 5 MG/1
5 TABLET ORAL
Status: DISCONTINUED | OUTPATIENT
Start: 2018-12-28 | End: 2018-12-28

## 2018-12-28 RX ORDER — ONDANSETRON 4 MG/1
4 TABLET, ORALLY DISINTEGRATING ORAL EVERY 4 HOURS PRN
Status: DISCONTINUED | OUTPATIENT
Start: 2018-12-28 | End: 2019-01-08 | Stop reason: HOSPADM

## 2018-12-28 RX ORDER — OXYCODONE HYDROCHLORIDE 5 MG/1
30 TABLET ORAL EVERY 4 HOURS PRN
Status: DISCONTINUED | OUTPATIENT
Start: 2018-12-28 | End: 2019-01-08 | Stop reason: HOSPADM

## 2018-12-28 RX ADMIN — SODIUM CHLORIDE 1000 ML: 9 INJECTION, SOLUTION INTRAVENOUS at 10:28

## 2018-12-28 RX ADMIN — HALOPERIDOL LACTATE 5 MG: 5 INJECTION, SOLUTION INTRAMUSCULAR at 10:29

## 2018-12-28 RX ADMIN — ONDANSETRON 4 MG: 2 INJECTION INTRAMUSCULAR; INTRAVENOUS at 10:29

## 2018-12-28 RX ADMIN — HYDROMORPHONE HYDROCHLORIDE 1 MG: 1 INJECTION, SOLUTION INTRAMUSCULAR; INTRAVENOUS; SUBCUTANEOUS at 10:49

## 2018-12-28 ASSESSMENT — PATIENT HEALTH QUESTIONNAIRE - PHQ9
2. FEELING DOWN, DEPRESSED, IRRITABLE, OR HOPELESS: NOT AT ALL
1. LITTLE INTEREST OR PLEASURE IN DOING THINGS: NOT AT ALL
SUM OF ALL RESPONSES TO PHQ9 QUESTIONS 1 AND 2: 0

## 2018-12-28 ASSESSMENT — COGNITIVE AND FUNCTIONAL STATUS - GENERAL
TURNING FROM BACK TO SIDE WHILE IN FLAT BAD: A LITTLE
WALKING IN HOSPITAL ROOM: A LITTLE
MOBILITY SCORE: 18
SUGGESTED CMS G CODE MODIFIER DAILY ACTIVITY: CJ
DRESSING REGULAR UPPER BODY CLOTHING: A LITTLE
DAILY ACTIVITIY SCORE: 21
STANDING UP FROM CHAIR USING ARMS: A LITTLE
CLIMB 3 TO 5 STEPS WITH RAILING: A LOT
HELP NEEDED FOR BATHING: A LITTLE
EATING MEALS: A LITTLE
SUGGESTED CMS G CODE MODIFIER MOBILITY: CK
MOVING FROM LYING ON BACK TO SITTING ON SIDE OF FLAT BED: A LITTLE

## 2018-12-28 ASSESSMENT — PAIN SCALES - GENERAL
PAINLEVEL_OUTOF10: 5
PAINLEVEL_OUTOF10: 8
PAINLEVEL_OUTOF10: 7

## 2018-12-28 ASSESSMENT — LIFESTYLE VARIABLES
DO YOU DRINK ALCOHOL: NO
ALCOHOL_USE: NO
EVER_SMOKED: NEVER

## 2018-12-28 ASSESSMENT — ENCOUNTER SYMPTOMS
NERVOUS/ANXIOUS: 1
BACK PAIN: 1
TINGLING: 1

## 2018-12-28 NOTE — ED NOTES
Med rec completed per pt and pt's home pharmacy  Allergies reviewed  No PO antibiotics in the last 30 days    Pt takes 2 different Magnesium pills and alternates them every other day.    Pt does not know her last doses of medications because she has been throwing up for weeks

## 2018-12-28 NOTE — ASSESSMENT & PLAN NOTE
-Not currently on chronic anticoagulation secondary to Crohn's  -Continue home metoprolol  -Xarelto  -no current issues

## 2018-12-28 NOTE — ASSESSMENT & PLAN NOTE
Status post surgery 12/30, POD#7  Tolerated procedure very well.  Continue monitor by surgery.  -good output now, non bloody  -Continues to complain of severe pain with no clinical evidence to support level of pain.  Concern for drug-seeking.    -Decrease IV dilaudid.

## 2018-12-28 NOTE — ED TRIAGE NOTES
Chief Complaint   Patient presents with   • N/V     history of Crohns       Pt ambulated to triage with walker, c/o n/v since 11/2018. Pt very anxious and irritable when I ask her questions. She said she has history of Crohn's.   Blood pressure (!) 197/105, pulse (!) 59, temperature 36.2 °C (97.1 °F), temperature source Temporal, resp. rate (!) 21, height 1.829 m (6'), weight 71.7 kg (158 lb), SpO2 98 %, not currently breastfeeding.

## 2018-12-28 NOTE — ED PROVIDER NOTES
ED Provider Note    CHIEF COMPLAINT  Chief Complaint   Patient presents with   • N/V     history of Crohns       HPI  Jana Overton is a 63 y.o. female who presents with abdominal pain.  The patient has a history of Crohn's disease and recurrent abdominal discomfort.  She states that she has been struggling since mid November after she received a flu and pneumonia vaccines.  She states that she has had recurrent emesis since that time as well as abdominal pain.  Patient states she needs a revision of her ostomy.  She has had a complete colectomy due to Crohn's disease.  She states that she has been unable to keep any of her medication down over the last several days.  She states she is extremely dehydrated.  She states that since she cannot keep anything down she has not been able take her medication for atrial fibrillation nor her peripheral neuropathy.  Therefore she also presents with bilateral leg pain.  Patient states the pain is worse with any type of touch or movement.  She has not had any associated fevers.  She has not any change in her urinary habits.  She is unaware of any fevers.  She states the abdominal pain is diffuse and severe in intensity.  The pain is worse with oral ingestion.  She is unaware of any relieving factors.    REVIEW OF SYSTEMS  See HPI for further details. All other systems are negative.     PAST MEDICAL HISTORY  Past Medical History:   Diagnosis Date   • Postherpetic neuralgia 6/24/2014   • Multiple falls 6/24/2014   • COPD (chronic obstructive pulmonary disease) ([facility]) 6/24/2014   • COPD (chronic obstructive pulmonary disease) ([facility]) 6/24/2014   • Rosacea 6/24/2014   • Hypokalemia 6/24/2014   • Pain 7/11/13    all over   • Colostomy in place ([facility]) 10/14/2010   • Narcotic dependence ([facility]) 9/23/2009   • Anesthesia     difficult Iv stick   • Anxiety    • Arthritis     all over   • ASTHMA    • Atrial fib/flut    • Backpain    • Breath shortness    • Bronchitis     5 years   • Chronic  pain    • Crohn's disease of colon (HCC)    • Dyspnea    • History of cardiac murmur    • Indigestion    • Infectious disease     MRSA, VancoRSA   • Obstruction    • Other specified disorder of intestines     crohns disease   • Pneumonia     child and mid 30's   • Sleep apnea        FAMILY HISTORY  [unfilled]    SOCIAL HISTORY  Social History     Social History   • Marital status:      Spouse name: N/A   • Number of children: N/A   • Years of education: N/A     Social History Main Topics   • Smoking status: Never Smoker   • Smokeless tobacco: Never Used      Comment: second hand smoke   • Alcohol use Yes      Comment: rare   • Drug use: Yes      Comment: medical marijuana   • Sexual activity: Not on file      Comment:      Other Topics Concern   • Not on file     Social History Narrative   • No narrative on file       SURGICAL HISTORY  Past Surgical History:   Procedure Laterality Date   • ILEOSTOMY  5/14/2014    Performed by Kevin Cruz M.D. at SURGERY [facility]   • MAMMOPLASTY REDUCTION  7/17/2013    Performed by Janey Burt M.D. at SURGERY [facility]   • HARDWARE REMOVAL NEURO  7/16/2012    Performed by KEELEY KIM at SURGERY [facility]   • GASTROSCOPY  10/4/2011    Performed by TYSON MARTINEZ at SURGERY SAME DAY [facility] ORS   • COLONOSCOPY  10/4/2011    Performed by TYSON MARTINEZ at SURGERY SAME DAY [facility] ORS   • DILATION AND CURETTAGE  9/24/2010    Performed by GAURAV ALY at SURGERY SAME DAY [facility] ORS   • ILEOSTOMY  11/11/2009    Performed by KEVIN CRUZ at SURGERY [facility]   • GASTROSCOPY WITH BIOPSY  11/22/08    Performed by NEGRITA HUYNH at SURGERY [facility]   • ABDOMINAL EXPLORATION     • CERVICAL DISK AND FUSION ANTERIOR     • COLON RESECTION     • FOOT SURGERY     • HAND SURGERY     • LUMBAR FUSION ANTERIOR     • LUMPECTOMY     • OTHER ABDOMINAL SURGERY      surgery for chrons disease   • PB REDUCTION OF LARGE BREAST          CURRENT MEDICATIONS  Home Medications     Reviewed by Ary Galindo R.N. (Registered Nurse) on 12/28/18 at 0845  Med List Status: Unable to Obtain   Medication Last Dose Status   albuterol (VENTOLIN OR PROVENTIL) 108 (90 BASE) MCG/ACT Aero Soln  Active   aspirin (ASA) 81 MG Chew Tab chewable tablet  Active   Azelastine HCl 137 MCG/SPRAY Solution  Active   cetirizine (ZYRTEC) 10 MG Tab  Active   estradiol (ESTRACE) 0.1 MG/GM vaginal cream  Active   FUROSEMIDE PO  Active   granisetron (KYTRIL) 1 MG Tab  Active   ipratropium-albuterol (DUONEB) 0.5-2.5 (3) MG/3ML nebulizer solution  Active   Magnesium 100 MG Tab  Active   MAGNESIUM CITRATE PO  Active   metoprolol (LOPRESSOR) 25 MG Tab  Active   ondansetron (ZOFRAN ODT) 4 MG TABLET DISPERSIBLE  Active   oxycodone 20 MG/ML CONC  Active   spironolactone (ALDACTONE) 25 MG Tab  Active                ALLERGIES  Allergies   Allergen Reactions   • Azathioprine Sodium Hives   • Carafate [Sucralfate] Rash     Generalizes/Mouth Sores   • Infliximab Hives   • Morphine Swelling   • Roxanol [Morphine Sulfate] Swelling     Throat swells   • Amitriptyline Unspecified     Nightmares     • Demerol Vomiting   • Fentanyl Unspecified     Patches caused skin breakdown, no pain relief   • Lorazepam Unspecified     States give me nightmares.    • Methadone    • Methotrexate Hives and Rash   • Pregabalin Rash     Rash     • Pseudoephedrine Vomiting   • Sulfa Drugs Hives   • Tape Rash     Skin peels off; paper tape   • Celestone [Betamethasone Sodium Phosphate] Unspecified     Pt unable to remember   • Sulfasalazine Unspecified     Pt unable to remember   • Tizanidine Hydrochloride Unspecified     Pt unable to remember       PHYSICAL EXAM  VITAL SIGNS: BP (!) 197/105   Pulse (!) 126   Temp 36.2 °C (97.1 °F) (Temporal)   Resp (!) 48   Ht 1.829 m (6')   Wt 71.7 kg (158 lb)   SpO2 97%   BMI 21.43 kg/m²  Room air O2: 98    Constitutional: Anxious and in distress.   HENT:  Normocephalic, Atraumatic, Bilateral external ears normal, Oropharynx dry, No oral exudates, Nose normal.   Eyes: PERRLA, EOMI, Conjunctiva normal, No discharge.   Neck: Normal range of motion, No tenderness, Supple, No stridor.   Lymphatic: No lymphadenopathy noted.   Cardiovascular: Tachycardic heart rate, Normal rhythm, No murmurs, No rubs, No gallops.   Thorax & Lungs: Normal breath sounds, No respiratory distress, No wheezing, No chest tenderness.   Abdomen: Diffuse tenderness, no distention, ostomy site appears healthy, normal bowel sounds.   Skin: Warm, Dry, No erythema, No rash.   Back: No tenderness, No CVA tenderness.   Extremities: Intact distal pulses, No edema, No tenderness, No cyanosis, No clubbing.   Musculoskeletal: Good range of motion in all major joints. No tenderness to palpation or major deformities noted.   Neurologic: Alert & oriented x 3, Normal motor function, Normal sensory function, No focal deficits noted.   Psychiatric: Anxious .     COURSE & MEDICAL DECISION MAKING  Pertinent Labs & Imaging studies reviewed. (See chart for details)  This a 63-year-old female who presents the [facility] department with abdominal pain and vomiting.  The patient does have a known history of Crohn's disease and she is also had a colectomy.  Clinically she does not appear to be obstructed.  She does have a leukocytosis and I spoke with Dr. mustafa the patient's surgeon and he recommends a CT scan with IV and oral contrast to further delineate the source of her pain.  He would like the patient admitted for pain control and further workup.  The patient does have a slight leukocytosis as mentioned above but she has had significant improvement with Haldol and Dilaudid for pain control.  On arrival the patient did have dry mucous membranes and she is tachycardic therefore she received a liter of fluid.  She does feel better on repeat examination.  The slight elevation in her BUN does support some dehydration from the  vomiting.  Clinically I suspect her pain is from her exacerbation of her Crohn's disease and I do not suspect this is from a surgical source.  However she will have a CT scan with IV and oral contrast per the surgeon he will follow-up on these results.  FINAL IMPRESSION  1.  Abdominal pain  2.  Vomiting  3.  Dehydration    Disposition  The patient will be admitted in stable condition         Electronically signed by: Abner Oshea, 12/28/2018 9:53 AM    In speaking with the hospitalist the patient has had multiple CT scans in the last month and therefore we canceled the CT scan.  I did speak with Dr. Bynum and she states the patient has not had Crohn's disease in quite some time.  The patient was evaluated by Dr. Stern according to Dr. Bynum she does have a very narrowed stoma and he feels this will require revision.  Therefore I spoke with Dr. mustafa and he will evaluate the patient for surgical intervention for revision of the stoma.  The patient be admitted to the hospitalist in stable condition.

## 2018-12-28 NOTE — ASSESSMENT & PLAN NOTE
-Continue home oxycodone, though patient refuses oral formulation solution given prior intolerances  -have  bring in bottle unopened and have verified by pharmacy?  -concern about opioid addiction  -Patient very aggressive when told her IV narcotics would be decreased.  -Exhibits drug-seeking behaviors.

## 2018-12-28 NOTE — ASSESSMENT & PLAN NOTE
-History of  -Followed by GI as outpatient  -UPPER GI with almost complete blockage of contrast material at the GE junction  -S/P EGD 1/6 with esophageal dilation and biopsy.    -Achalasia not ruled out.    -Will need oupatient manometry eval and possible LES botox depending on results.  -Dysphagia improved.  Advance diet to full liquids which is patients home diet.  -GI following.

## 2018-12-28 NOTE — H&P
Hospital Medicine History & Physical Note    Date of Service  12/28/2018    Primary Care Physician  Amanda Bazzi M.D.    Consultants  GI Dr. Mahan  Surgery Dr. trinidad      Code Status  Full    Chief Complaint  Abdominal pain nausea vomiting    History of Presenting Illness  63 y.o. female who presented 12/28/2018 with generalized abdominal pain worse with eating severe nonradiating with no specific relieving factors.  This has been associated with intractable nausea and vomiting to the point where she has been unable to keep liquids down or her medications.  She reports that she has history of Crohn's disease and has had a previous colectomy with ileostomy secondary to her Crohn's disease.  She is not on any maintenance therapy for her Crohn's.  . She is having output from her stoma no melena no hematochezia no hematemesis or coffee-ground emesis.  She has a history of atrial fibrillation has not been maintained on chronic anticoagulation.  She reports intermittent dysphagia.  She was recently hospitalized at [facility] and evaluated by Dr. Nunez who felt that she needs a revision of her ostomy secondary to suspected scars/stricture.    Review of Systems  Review of Systems   Musculoskeletal: Positive for back pain and joint pain.   Neurological: Positive for tingling (History of neuropathy).   Psychiatric/Behavioral: The patient is nervous/anxious.    All other systems reviewed and are negative.      Past Medical History   has a past medical history of Anesthesia; Anxiety; Arthritis; ASTHMA; Atrial fib/flut; Backpain; Breath shortness; Bronchitis; Chronic pain; Colostomy in place ([facility]) (10/14/2010); COPD (chronic obstructive pulmonary disease) ([facility]) (6/24/2014); COPD (chronic obstructive pulmonary disease) ([facility]) (6/24/2014); Crohn's disease of colon ([facility]); Dyspnea; History of cardiac murmur; Hypokalemia (6/24/2014); Indigestion; Infectious disease; Multiple falls (6/24/2014); Narcotic dependence ([facility])  (9/23/2009); Obstruction; Other specified disorder of intestines; Pain (7/11/13); Pneumonia; Postherpetic neuralgia (6/24/2014); Rosacea (6/24/2014); and Sleep apnea. She also has no past medical history of CAD (coronary artery disease); Liver disease; or Seizure disorder (HCC).    Surgical History   has a past surgical history that includes lumbar fusion anterior; cervical disk and fusion anterior; foot surgery; hand surgery; other abdominal surgery; gastroscopy with biopsy (11/22/08); ileostomy (11/11/2009); dilation and curettage (9/24/2010); gastroscopy (10/4/2011); colonoscopy (10/4/2011); hardware removal neuro (7/16/2012); mammoplasty reduction (7/17/2013); ileostomy (5/14/2014); pr reduction of large breast; abdominal exploration; lumpectomy; and colon resection.     Family History  family history includes Cancer in her maternal aunt; Diabetes in her father and sister; Heart Disease in her father; Lung Disease in her mother.     Social History   reports that she has never smoked. She has never used smokeless tobacco. She reports that she drinks alcohol. She reports that she uses drugs.    Allergies  Allergies   Allergen Reactions   • Azathioprine Sodium Hives   • Carafate [Sucralfate] Rash     Generalizes/Mouth Sores   • Infliximab Hives   • Morphine Swelling   • Roxanol [Morphine Sulfate] Swelling     Throat swells   • Amitriptyline Unspecified     Nightmares     • Demerol Vomiting   • Fentanyl Unspecified     Patches caused skin breakdown, no pain relief   • Lorazepam Unspecified     States give me nightmares.    • Methadone    • Methotrexate Hives and Rash   • Pregabalin Rash     Rash     • Pseudoephedrine Vomiting   • Sulfa Drugs Hives   • Tape Rash     Skin peels off; paper tape   • Celestone [Betamethasone Sodium Phosphate] Unspecified     Pt unable to remember   • Sulfasalazine Unspecified     Pt unable to remember   • Tizanidine Hydrochloride Unspecified     Pt unable to remember        Medications  Prior to Admission Medications   Prescriptions Last Dose Informant Patient Reported? Taking?   Azelastine HCl 137 MCG/SPRAY Solution  Patient Yes No   Sig: Spray 1 Spray in nose 2 times a day as needed (dry sinus).   FUROSEMIDE PO  Patient Yes No   Sig: Take 1 Tab by mouth every 14 days.   MAGNESIUM CITRATE PO  Patient Yes No   Sig: Take 1 Tab by mouth every 48 hours.   Magnesium 100 MG Tab  Patient Yes No   Sig: Take 100 mg by mouth every 48 hours.   albuterol (VENTOLIN OR PROVENTIL) 108 (90 BASE) MCG/ACT Aero Soln  Patient Yes No   Sig: Inhale 2 Puffs by mouth every 6 hours as needed.   aspirin (ASA) 81 MG Chew Tab chewable tablet  Patient Yes No   Sig: Take 81 mg by mouth 1 time daily as needed (thrombosis).   cetirizine (ZYRTEC) 10 MG Tab  Patient Yes No   Sig: Take 10 mg by mouth 1 time daily as needed for Allergies.   estradiol (ESTRACE) 0.1 MG/GM vaginal cream  Patient Yes No   Sig: Insert 0.5 g in vagina every 72 hours.   granisetron (KYTRIL) 1 MG Tab  Patient No No   Sig: Take 1 Tab by mouth every 12 hours as needed for Nausea/Vomiting.   ipratropium-albuterol (DUONEB) 0.5-2.5 (3) MG/3ML nebulizer solution  Patient Yes No   Sig: 3 mL by Nebulization route 4 times a day as needed for Shortness of Breath.   metoprolol (LOPRESSOR) 25 MG Tab  Patient Yes No   Sig: Take 25 mg by mouth 2 times a day.   ondansetron (ZOFRAN ODT) 4 MG TABLET DISPERSIBLE  Patient No No   Sig: Take 1 Tab by mouth every 6 hours as needed for Nausea.   oxycodone 20 MG/ML CONC  Patient Yes No   Sig: Take 30 mg by mouth every four hours as needed (pain). 20mg/ml solution   spironolactone (ALDACTONE) 25 MG Tab  Patient No No   Sig: TAKE 2 TABLETS BY MOUTH TWICE DAILY      Facility-Administered Medications: None       Physical Exam  Temp:  [36.2 °C (97.1 °F)] 36.2 °C (97.1 °F)  Pulse:  [] 82  Resp:  [12-30] 20  BP: (197)/(105) 197/105    Physical Exam   Constitutional: She is oriented to person, place, and time. She  appears well-developed and well-nourished.   HENT:   Head: Normocephalic and atraumatic.   Right Ear: External ear normal.   Left Ear: External ear normal.   Mouth/Throat: No oropharyngeal exudate.   Eyes: Conjunctivae are normal. Right eye exhibits no discharge. Left eye exhibits no discharge. No scleral icterus.   Neck: Neck supple. No JVD present. No tracheal deviation present.   Cardiovascular: Normal rate and regular rhythm.  Exam reveals no gallop and no friction rub.    No murmur heard.  Pulmonary/Chest: Effort normal and breath sounds normal. No stridor. No respiratory distress. She has no wheezes. She has no rales. She exhibits no tenderness.   Abdominal: Soft. Bowel sounds are normal. She exhibits no distension and no mass. There is tenderness (Generalized). There is no rebound and no guarding.   Stoma left lower quadrant with liquid stool   Musculoskeletal: She exhibits no edema or tenderness.   Neurological: She is alert and oriented to person, place, and time. No cranial nerve deficit. She exhibits normal muscle tone.   Skin: Skin is warm and dry. She is not diaphoretic. No cyanosis. Nails show no clubbing.   Psychiatric: She has a normal mood and affect. Her behavior is normal. Thought content normal.   Nursing note and vitals reviewed.      Laboratory:  Recent Labs      12/28/18   1021   WBC  12.7*   RBC  4.28   HEMOGLOBIN  13.0   HEMATOCRIT  39.6   MCV  92.5   MCH  30.4   MCHC  32.8*   RDW  44.8   PLATELETCT  213   MPV  10.6     Recent Labs      12/28/18   1021   SODIUM  135   POTASSIUM  4.1   CHLORIDE  102   CO2  21   GLUCOSE  109*   BUN  32*   CREATININE  1.10   CALCIUM  10.5     Recent Labs      12/28/18   1021   ALTSGPT  34   ASTSGOT  22   ALKPHOSPHAT  105*   TBILIRUBIN  0.5   LIPASE  20   GLUCOSE  109*                 No results for input(s): TROPONINI in the last 72 hours.    Urinalysis:    No results found     Imaging:  IR-US GUIDED PIV   Final Result    Ultrasound-guided PERIPHERAL IV  INSERTION performed by    qualified nursing staff as above.                  Assessment/Plan:  I anticipate this patient will require at least two midnights for appropriate medical management, necessitating inpatient admission.    * Intractable cyclical vomiting with nausea   Assessment & Plan    Patient will be started on IV fluids for hydration  She will be kept on clear liquid diet for now  Antiemetics as needed     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HCC)   Assessment & Plan    Dr. Cruz has been consulted and will be evaluating the patient regarding ileostomy revision     Crohn's disease of small intestine with complication (HCC)- (present on admission)   Assessment & Plan    Evaluated by Dr. Benites who does not feel that she has any acute exacerbation of her Crohn's at this time     Dysphasia   Assessment & Plan    Speech therapy evaluation   Aspiration precautions     DDD (degenerative disc disease), lumbar- (present on admission)   Assessment & Plan    Continue pain management     Paroxysmal atrial fibrillation (HCC)- (present on admission)   Assessment & Plan    Continue metoprolol and low-dose aspirin  Not been on chronic anticoagulation secondary to her history of Crohn's according to her report     Opioid type dependence, continuous (CMS-HCC)- (present on admission)   Assessment & Plan    Minimize use of IV narcotics     Chronic pain- (present on admission)   Assessment & Plan    Continue home dose of oxycodone and monitor         VTE prophylaxis: SCD

## 2018-12-29 ENCOUNTER — HOME CARE VISIT (OUTPATIENT)
Dept: HOME HEALTH SERVICES | Facility: HOME HEALTHCARE | Age: 63
End: 2018-12-29
Payer: MEDICARE

## 2018-12-29 ENCOUNTER — APPOINTMENT (OUTPATIENT)
Dept: RADIOLOGY | Facility: MEDICAL CENTER | Age: 63
DRG: 330 | End: 2018-12-29
Attending: SPECIALIST
Payer: MEDICARE

## 2018-12-29 PROBLEM — Z86.19 HISTORY OF INFECTION WITH VANCOMYCIN RESISTANT ENTEROCOCCUS (VRE): Status: ACTIVE | Noted: 2018-12-29

## 2018-12-29 PROBLEM — Z86.14 HISTORY OF MRSA INFECTION: Status: ACTIVE | Noted: 2018-12-29

## 2018-12-29 PROBLEM — Z86.14 HISTORY OF MRSA INFECTION: Chronic | Status: ACTIVE | Noted: 2018-12-29

## 2018-12-29 PROBLEM — R47.02 DYSPHASIA: Chronic | Status: ACTIVE | Noted: 2018-12-28

## 2018-12-29 PROBLEM — M51.369 DDD (DEGENERATIVE DISC DISEASE), LUMBAR: Chronic | Status: ACTIVE | Noted: 2017-04-12

## 2018-12-29 PROBLEM — Z86.19 HISTORY OF INFECTION WITH VANCOMYCIN RESISTANT ENTEROCOCCUS (VRE): Chronic | Status: ACTIVE | Noted: 2018-12-29

## 2018-12-29 PROBLEM — M51.36 DDD (DEGENERATIVE DISC DISEASE), LUMBAR: Chronic | Status: ACTIVE | Noted: 2017-04-12

## 2018-12-29 LAB
ANION GAP SERPL CALC-SCNC: 11 MMOL/L (ref 0–11.9)
BASOPHILS # BLD AUTO: 0.6 % (ref 0–1.8)
BASOPHILS # BLD: 0.05 K/UL (ref 0–0.12)
BUN SERPL-MCNC: 16 MG/DL (ref 8–22)
CALCIUM SERPL-MCNC: 9.7 MG/DL (ref 8.5–10.5)
CHLORIDE SERPL-SCNC: 106 MMOL/L (ref 96–112)
CO2 SERPL-SCNC: 23 MMOL/L (ref 20–33)
CREAT SERPL-MCNC: 0.78 MG/DL (ref 0.5–1.4)
EOSINOPHIL # BLD AUTO: 0.11 K/UL (ref 0–0.51)
EOSINOPHIL NFR BLD: 1.3 % (ref 0–6.9)
ERYTHROCYTE [DISTWIDTH] IN BLOOD BY AUTOMATED COUNT: 44.3 FL (ref 35.9–50)
GLUCOSE SERPL-MCNC: 97 MG/DL (ref 65–99)
HCT VFR BLD AUTO: 37.8 % (ref 37–47)
HGB BLD-MCNC: 12.5 G/DL (ref 12–16)
IMM GRANULOCYTES # BLD AUTO: 0.04 K/UL (ref 0–0.11)
IMM GRANULOCYTES NFR BLD AUTO: 0.5 % (ref 0–0.9)
LYMPHOCYTES # BLD AUTO: 1.95 K/UL (ref 1–4.8)
LYMPHOCYTES NFR BLD: 22.7 % (ref 22–41)
MCH RBC QN AUTO: 30.3 PG (ref 27–33)
MCHC RBC AUTO-ENTMCNC: 33.1 G/DL (ref 33.6–35)
MCV RBC AUTO: 91.5 FL (ref 81.4–97.8)
MONOCYTES # BLD AUTO: 0.65 K/UL (ref 0–0.85)
MONOCYTES NFR BLD AUTO: 7.6 % (ref 0–13.4)
NEUTROPHILS # BLD AUTO: 5.8 K/UL (ref 2–7.15)
NEUTROPHILS NFR BLD: 67.3 % (ref 44–72)
NRBC # BLD AUTO: 0 K/UL
NRBC BLD-RTO: 0 /100 WBC
PLATELET # BLD AUTO: 202 K/UL (ref 164–446)
PMV BLD AUTO: 10.5 FL (ref 9–12.9)
POTASSIUM SERPL-SCNC: 3.6 MMOL/L (ref 3.6–5.5)
RBC # BLD AUTO: 4.13 M/UL (ref 4.2–5.4)
SODIUM SERPL-SCNC: 140 MMOL/L (ref 135–145)
WBC # BLD AUTO: 8.6 K/UL (ref 4.8–10.8)

## 2018-12-29 PROCEDURE — C1781 MESH (IMPLANTABLE): HCPCS | Performed by: COLON & RECTAL SURGERY

## 2018-12-29 PROCEDURE — 501445 HCHG STAPLER, SKIN DISP: Performed by: COLON & RECTAL SURGERY

## 2018-12-29 PROCEDURE — 160029 HCHG SURGERY MINUTES - 1ST 30 MINS LEVEL 4: Performed by: COLON & RECTAL SURGERY

## 2018-12-29 PROCEDURE — A9270 NON-COVERED ITEM OR SERVICE: HCPCS | Performed by: HOSPITALIST

## 2018-12-29 PROCEDURE — 502704 HCHG DEVICE, LIGASURE IMPACT: Performed by: COLON & RECTAL SURGERY

## 2018-12-29 PROCEDURE — 770006 HCHG ROOM/CARE - MED/SURG/GYN SEMI*

## 2018-12-29 PROCEDURE — 501838 HCHG SUTURE GENERAL: Performed by: COLON & RECTAL SURGERY

## 2018-12-29 PROCEDURE — 700111 HCHG RX REV CODE 636 W/ 250 OVERRIDE (IP): Performed by: HOSPITALIST

## 2018-12-29 PROCEDURE — 05HY33Z INSERTION OF INFUSION DEVICE INTO UPPER VEIN, PERCUTANEOUS APPROACH: ICD-10-PCS | Performed by: HOSPITALIST

## 2018-12-29 PROCEDURE — 665999 HH PPS REVENUE DEBIT

## 2018-12-29 PROCEDURE — 160041 HCHG SURGERY MINUTES - EA ADDL 1 MIN LEVEL 4: Performed by: COLON & RECTAL SURGERY

## 2018-12-29 PROCEDURE — 36569 INSJ PICC 5 YR+ W/O IMAGING: CPT

## 2018-12-29 PROCEDURE — 0DJD8ZZ INSPECTION OF LOWER INTESTINAL TRACT, VIA NATURAL OR ARTIFICIAL OPENING ENDOSCOPIC: ICD-10-PCS | Performed by: COLON & RECTAL SURGERY

## 2018-12-29 PROCEDURE — 0DN80ZZ RELEASE SMALL INTESTINE, OPEN APPROACH: ICD-10-PCS | Performed by: COLON & RECTAL SURGERY

## 2018-12-29 PROCEDURE — 700101 HCHG RX REV CODE 250

## 2018-12-29 PROCEDURE — 0D1B0Z4 BYPASS ILEUM TO CUTANEOUS, OPEN APPROACH: ICD-10-PCS | Performed by: COLON & RECTAL SURGERY

## 2018-12-29 PROCEDURE — 700105 HCHG RX REV CODE 258: Performed by: HOSPITALIST

## 2018-12-29 PROCEDURE — 99232 SBSQ HOSP IP/OBS MODERATE 35: CPT | Performed by: HOSPITALIST

## 2018-12-29 PROCEDURE — 160035 HCHG PACU - 1ST 60 MINS PHASE I: Performed by: COLON & RECTAL SURGERY

## 2018-12-29 PROCEDURE — 700111 HCHG RX REV CODE 636 W/ 250 OVERRIDE (IP): Performed by: NURSE PRACTITIONER

## 2018-12-29 PROCEDURE — 88304 TISSUE EXAM BY PATHOLOGIST: CPT

## 2018-12-29 PROCEDURE — 160009 HCHG ANES TIME/MIN: Performed by: COLON & RECTAL SURGERY

## 2018-12-29 PROCEDURE — 700101 HCHG RX REV CODE 250: Performed by: NURSE PRACTITIONER

## 2018-12-29 PROCEDURE — 665998 HH PPS REVENUE CREDIT

## 2018-12-29 PROCEDURE — 85025 COMPLETE CBC W/AUTO DIFF WBC: CPT

## 2018-12-29 PROCEDURE — 500380 HCHG DRAIN, PENROSE 1/4X12: Performed by: COLON & RECTAL SURGERY

## 2018-12-29 PROCEDURE — 700111 HCHG RX REV CODE 636 W/ 250 OVERRIDE (IP)

## 2018-12-29 PROCEDURE — 36415 COLL VENOUS BLD VENIPUNCTURE: CPT

## 2018-12-29 PROCEDURE — 80048 BASIC METABOLIC PNL TOTAL CA: CPT

## 2018-12-29 PROCEDURE — C1765 ADHESION BARRIER: HCPCS | Performed by: COLON & RECTAL SURGERY

## 2018-12-29 PROCEDURE — 160048 HCHG OR STATISTICAL LEVEL 1-5: Performed by: COLON & RECTAL SURGERY

## 2018-12-29 PROCEDURE — 700102 HCHG RX REV CODE 250 W/ 637 OVERRIDE(OP): Performed by: HOSPITALIST

## 2018-12-29 PROCEDURE — 0DBB0ZZ EXCISION OF ILEUM, OPEN APPROACH: ICD-10-PCS | Performed by: COLON & RECTAL SURGERY

## 2018-12-29 PROCEDURE — 160036 HCHG PACU - EA ADDL 30 MINS PHASE I: Performed by: COLON & RECTAL SURGERY

## 2018-12-29 PROCEDURE — 160002 HCHG RECOVERY MINUTES (STAT): Performed by: COLON & RECTAL SURGERY

## 2018-12-29 DEVICE — GENTRIX SURGICAL MATRIX 7CM X 10CM: Type: IMPLANTABLE DEVICE | Status: FUNCTIONAL

## 2018-12-29 RX ORDER — DIPHENHYDRAMINE HYDROCHLORIDE 50 MG/ML
12.5 INJECTION INTRAMUSCULAR; INTRAVENOUS
Status: DISCONTINUED | OUTPATIENT
Start: 2018-12-29 | End: 2018-12-29 | Stop reason: HOSPADM

## 2018-12-29 RX ORDER — OXYCODONE HCL 5 MG/5 ML
10 SOLUTION, ORAL ORAL
Status: DISCONTINUED | OUTPATIENT
Start: 2018-12-29 | End: 2018-12-29 | Stop reason: HOSPADM

## 2018-12-29 RX ORDER — HYDROMORPHONE HYDROCHLORIDE 1 MG/ML
0.1 INJECTION, SOLUTION INTRAMUSCULAR; INTRAVENOUS; SUBCUTANEOUS
Status: DISCONTINUED | OUTPATIENT
Start: 2018-12-29 | End: 2018-12-29 | Stop reason: HOSPADM

## 2018-12-29 RX ORDER — HYDROMORPHONE HYDROCHLORIDE 1 MG/ML
0.4 INJECTION, SOLUTION INTRAMUSCULAR; INTRAVENOUS; SUBCUTANEOUS
Status: DISCONTINUED | OUTPATIENT
Start: 2018-12-29 | End: 2018-12-29 | Stop reason: HOSPADM

## 2018-12-29 RX ORDER — ONDANSETRON 2 MG/ML
4 INJECTION INTRAMUSCULAR; INTRAVENOUS
Status: DISCONTINUED | OUTPATIENT
Start: 2018-12-29 | End: 2018-12-29 | Stop reason: HOSPADM

## 2018-12-29 RX ORDER — SODIUM CHLORIDE, SODIUM LACTATE, POTASSIUM CHLORIDE, CALCIUM CHLORIDE 600; 310; 30; 20 MG/100ML; MG/100ML; MG/100ML; MG/100ML
INJECTION, SOLUTION INTRAVENOUS CONTINUOUS
Status: DISCONTINUED | OUTPATIENT
Start: 2018-12-29 | End: 2018-12-29 | Stop reason: HOSPADM

## 2018-12-29 RX ORDER — HALOPERIDOL 5 MG/ML
1 INJECTION INTRAMUSCULAR
Status: DISCONTINUED | OUTPATIENT
Start: 2018-12-29 | End: 2018-12-29 | Stop reason: HOSPADM

## 2018-12-29 RX ORDER — SODIUM CHLORIDE AND POTASSIUM CHLORIDE 150; 900 MG/100ML; MG/100ML
INJECTION, SOLUTION INTRAVENOUS CONTINUOUS
Status: DISCONTINUED | OUTPATIENT
Start: 2018-12-29 | End: 2019-01-02

## 2018-12-29 RX ORDER — ESTRADIOL 0.1 MG/G
0.5 CREAM VAGINAL
Status: DISCONTINUED | OUTPATIENT
Start: 2018-12-29 | End: 2019-01-08 | Stop reason: HOSPADM

## 2018-12-29 RX ORDER — SCOLOPAMINE TRANSDERMAL SYSTEM 1 MG/1
1 PATCH, EXTENDED RELEASE TRANSDERMAL
Status: DISCONTINUED | OUTPATIENT
Start: 2018-12-29 | End: 2019-01-08 | Stop reason: HOSPADM

## 2018-12-29 RX ORDER — POTASSIUM CHLORIDE 20 MEQ/1
20 TABLET, EXTENDED RELEASE ORAL DAILY
Status: DISCONTINUED | OUTPATIENT
Start: 2018-12-29 | End: 2018-12-30

## 2018-12-29 RX ORDER — HYDRALAZINE HYDROCHLORIDE 20 MG/ML
5 INJECTION INTRAMUSCULAR; INTRAVENOUS
Status: DISCONTINUED | OUTPATIENT
Start: 2018-12-29 | End: 2018-12-29 | Stop reason: HOSPADM

## 2018-12-29 RX ORDER — HYDROMORPHONE HYDROCHLORIDE 1 MG/ML
0.2 INJECTION, SOLUTION INTRAMUSCULAR; INTRAVENOUS; SUBCUTANEOUS
Status: DISCONTINUED | OUTPATIENT
Start: 2018-12-29 | End: 2018-12-29 | Stop reason: HOSPADM

## 2018-12-29 RX ORDER — OXYCODONE HCL 5 MG/5 ML
5 SOLUTION, ORAL ORAL
Status: DISCONTINUED | OUTPATIENT
Start: 2018-12-29 | End: 2018-12-29 | Stop reason: HOSPADM

## 2018-12-29 RX ORDER — MAGNESIUM HYDROXIDE 1200 MG/15ML
LIQUID ORAL
Status: COMPLETED | OUTPATIENT
Start: 2018-12-29 | End: 2018-12-29

## 2018-12-29 RX ADMIN — ONDANSETRON HYDROCHLORIDE 4 MG: 2 INJECTION, SOLUTION INTRAMUSCULAR; INTRAVENOUS at 14:29

## 2018-12-29 RX ADMIN — HYDROMORPHONE HYDROCHLORIDE 0.25 MG: 1 INJECTION, SOLUTION INTRAMUSCULAR; INTRAVENOUS; SUBCUTANEOUS at 09:58

## 2018-12-29 RX ADMIN — SCOPALAMINE 1 PATCH: 1 PATCH, EXTENDED RELEASE TRANSDERMAL at 06:23

## 2018-12-29 RX ADMIN — HYDROMORPHONE HYDROCHLORIDE 0.25 MG: 1 INJECTION, SOLUTION INTRAMUSCULAR; INTRAVENOUS; SUBCUTANEOUS at 13:02

## 2018-12-29 RX ADMIN — POTASSIUM CHLORIDE AND SODIUM CHLORIDE: 900; 150 INJECTION, SOLUTION INTRAVENOUS at 11:29

## 2018-12-29 RX ADMIN — SODIUM CHLORIDE: 9 INJECTION, SOLUTION INTRAVENOUS at 10:13

## 2018-12-29 RX ADMIN — ONDANSETRON HYDROCHLORIDE 4 MG: 2 INJECTION, SOLUTION INTRAMUSCULAR; INTRAVENOUS at 09:47

## 2018-12-29 RX ADMIN — POTASSIUM CHLORIDE AND SODIUM CHLORIDE: 900; 150 INJECTION, SOLUTION INTRAVENOUS at 20:22

## 2018-12-29 RX ADMIN — Medication: at 18:11

## 2018-12-29 ASSESSMENT — ENCOUNTER SYMPTOMS
NAUSEA: 1
ABDOMINAL PAIN: 1
EYES NEGATIVE: 1
NERVOUS/ANXIOUS: 1
FOCAL WEAKNESS: 0
FLANK PAIN: 0
BACK PAIN: 0
HEADACHES: 0
WEAKNESS: 1
WHEEZING: 0
MEMORY LOSS: 0
PALPITATIONS: 1
DIZZINESS: 0
CONSTIPATION: 1
SHORTNESS OF BREATH: 0

## 2018-12-29 ASSESSMENT — PAIN SCALES - GENERAL
PAINLEVEL_OUTOF10: 7
PAINLEVEL_OUTOF10: 0
PAINLEVEL_OUTOF10: 0
PAINLEVEL_OUTOF10: 9
PAINLEVEL_OUTOF10: 0
PAINLEVEL_OUTOF10: 9

## 2018-12-29 NOTE — RESPIRATORY CARE
COPD EDUCATION by COPD CLINICAL EDUCATOR  12/29/2018 at 7:16 AM by Kristyn Rodas     Patient reviewed by COPD education team. Patient does not qualify for COPD program.

## 2018-12-29 NOTE — PROGRESS NOTES
"Hospital Medicine Daily Progress Note    Date of Service  12/29/2018    Chief Complaint  63 y.o. female with PMH Crohn's with previous colectomy with ileostomy admitted 12/28/2018 with generalized abdominal pain, nausea decreased output from her ileostomy.  Recently hospitalized at [facility] and evaluated by Dr. Nunez.    Hospital Course    .      Interval Problem Update  -She is not wanting to anything p.o. for fear of vomiting \"because my esophagus is already damaged\"  -She is convinced she has active MRSA infection.  Per chart review, she had a (+) MRSA nasal swab in 2014, otherwise no indications of active MRSA  -Hypokalemia.  Replacing  -OR today for ostomy revision and possible ex lap    Consultants/Specialty  -General surgery    Code Status  Full    Disposition  TBD    Review of Systems  Review of Systems   Constitutional: Positive for malaise/fatigue.   HENT: Negative.    Eyes: Negative.    Respiratory: Negative for shortness of breath and wheezing.    Cardiovascular: Positive for palpitations. Negative for chest pain and leg swelling.   Gastrointestinal: Positive for abdominal pain, constipation and nausea.   Genitourinary: Negative for flank pain and hematuria.        \"I do a lot of Kegel's and that helps me urinate\"   Musculoskeletal: Negative for back pain and joint pain.   Neurological: Positive for weakness. Negative for dizziness, focal weakness and headaches.   Psychiatric/Behavioral: Negative for memory loss. The patient is nervous/anxious.    All other systems reviewed and are negative.       Physical Exam  Temp:  [36.4 °C (97.5 °F)-36.7 °C (98 °F)] 36.6 °C (97.8 °F)  Pulse:  [] 104  Resp:  [10-40] 18  BP: (136-156)/(62-79) 150/79    Physical Exam   Constitutional: She is oriented to person, place, and time. Vital signs are normal. She appears well-developed. She is cooperative. She has a sickly appearance. No distress.   HENT:   Head: Normocephalic.   Right Ear: Hearing normal.   Left " Ear: Hearing normal.   Nose: Nose normal.   Mouth/Throat: Oropharynx is clear and moist and mucous membranes are normal.   Eyes: Conjunctivae are normal.   Neck: Phonation normal. No JVD present.   Cardiovascular: Normal heart sounds and intact distal pulses.  Tachycardia present.    No edema  Cap refill WNL   Pulmonary/Chest: Effort normal and breath sounds normal. She has no rhonchi.   Abdominal: Soft. Normal appearance. She exhibits no distension. Bowel sounds are decreased. There is generalized tenderness. There is no rigidity.   Ileostomy with no output noted in bag.  Unable to visualize stoma due to appliance   Musculoskeletal: Normal range of motion.   Neurological: She is alert and oriented to person, place, and time. She has normal strength. GCS eye subscore is 4. GCS verbal subscore is 5. GCS motor subscore is 6.   LEÓN 5/5   Skin: Skin is dry. There is pallor.   Psychiatric: Judgment normal. Her mood appears anxious. Her speech is rapid and/or pressured.   Nursing note and vitals reviewed.      Fluids    Intake/Output Summary (Last 24 hours) at 12/29/18 1138  Last data filed at 12/28/18 1600   Gross per 24 hour   Intake                0 ml   Output                0 ml   Net                0 ml       Laboratory  Recent Labs      12/28/18   1021  12/29/18   0233   WBC  12.7*  8.6   RBC  4.28  4.13*   HEMOGLOBIN  13.0  12.5   HEMATOCRIT  39.6  37.8   MCV  92.5  91.5   MCH  30.4  30.3   MCHC  32.8*  33.1*   RDW  44.8  44.3   PLATELETCT  213  202   MPV  10.6  10.5     Recent Labs      12/28/18   1021  12/29/18   0233   SODIUM  135  140   POTASSIUM  4.1  3.6   CHLORIDE  102  106   CO2  21  23   GLUCOSE  109*  97   BUN  32*  16   CREATININE  1.10  0.78   CALCIUM  10.5  9.7                   Imaging  IR-US GUIDED PIV   Final Result    Ultrasound-guided PERIPHERAL IV INSERTION performed by    qualified nursing staff as above.            IR-MIDLINE CATHETER INSERTION >5 YRS    (Results Pending)         Assessment/Plan  * Intractable cyclical vomiting with nausea- (present on admission)   Assessment & Plan    -No episodes of vomiting.  Ongoing nausea.  Patient not wanting to take any medications orally due to fear of vomiting.  -N.p.o. for surgery  -IV fluid hydration with KCl       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HCC)- (present on admission)   Assessment & Plan    -Plan for OR today for ostomy revision and possible ex lap       Crohn's disease of small intestine with complication (HCC)- (present on admission)   Assessment & Plan    -Followed as outpatient by Dr. Cruz  -Not currently in acute exacerbation       History of infection with vancomycin resistant Enterococcus (VRE)- (present on admission)   Assessment & Plan    -remote history of     History of MRSA infection- (present on admission)   Assessment & Plan    -Per chart review (+) MRSA sergio 2014     Dysphasia- (present on admission)   Assessment & Plan    -History of  -Followed by GI as outpatient  -Aspiration precautions  -SLP eval  -Currently n.p.o. for surgery     Hypokalemia- (present on admission)   Assessment & Plan    -Likely due to vomiting  -Replacing  -Follow labs     DDD (degenerative disc disease), lumbar- (present on admission)   Assessment & Plan    -Chronic pain management     Paroxysmal atrial fibrillation (HCC)- (present on admission)   Assessment & Plan    -Not currently on chronic anticoagulation secondary to Crohn's  -Continue home metoprolol  -Baby ASA       Opioid type dependence, continuous (CMS-HCC)- (present on admission)   Assessment & Plan    -Continue home oxycodone     Chronic pain- (present on admission)   Assessment & Plan    -On chronic opioid management -Dr. Telles  -Bowel protocol          VTE prophylaxis: SCD      MARCO Umanzor

## 2018-12-29 NOTE — CARE PLAN
Problem: Safety  Goal: Will remain free from falls  Outcome: PROGRESSING AS EXPECTED  Bed locked in lowest position, treaded socks in place, call light within reach.  Pt instructed to call for assistance.    Problem: Psychosocial Needs:  Goal: Level of anxiety will decrease  Outcome: PROGRESSING AS EXPECTED  One on one conversation provided.  Emotional support and active listening provided.

## 2018-12-29 NOTE — PROGRESS NOTES
Patient arrived to unit via gurney from ED.  Anxious upon arrival, pt upset that private room cannot be accommodated.  Stated she has MRSA and strong smelling bowel movements and does not wish to share a bathroom.  Charge RN spoke to patient and explained that private room cannot be provided.  [place]  Patient then became very upset about size of bathroom.  Patient resting in bed now, appears less anxious.  Complaining of pain and nausea.  IV infiltrated, unable to give IV medications.  Patient is difficult stick, needs ultrasound guided IV.  Patient refusing oral medications due to nausea.  Dr. Gladys Lopez paged with update, new order received to place ultrasound guided IV.  IV pending ultrasound IV certified RN, charge RN aware.

## 2018-12-29 NOTE — CARE PLAN
Problem: Knowledge Deficit  Goal: Knowledge of disease process/condition, treatment plan, diagnostic tests, and medications will improve  Outcome: PROGRESSING AS EXPECTED  Questions answered regarding plan of care. Pt and family member updated on poc.     Problem: Psychosocial Needs:  Goal: Level of anxiety will decrease  Outcome: PROGRESSING SLOWER THAN EXPECTED  Pt educated on distraction as a way to cope with anxiety. Pt family member at bedside to aid in decreasing anxiety.

## 2018-12-29 NOTE — PROGRESS NOTES
Pt alert and oriented. Pt spouse at bedside. Pt iv infiltrated, pt expressing some anxiety regarding iv replacement. Pt complaining of pain and nausea, stating that PO medications are not tolerated at this time due to her nausea. Awaiting iv access. Call light in reach.

## 2018-12-29 NOTE — PROGRESS NOTES
Pt IV access pending. Multiple attempts made at iv insertion. MD Jean-Baptiste paged regarding pt nausea and inability to give iv Zofran at this time. Pt states she does not tolerate po zofran. Received order for scopolamine patch.

## 2018-12-29 NOTE — PROCEDURES
Vascular Access Team    Date of Insertion: 12/29/18  Arm Circumference: n/a  Line Length: 10cm  Line Size: 18g  Vein Occupancy %: 26  Reason for Midline: Access  Labs: WBC 8.6, , BUN 16, Cr 0.78, GFR >60, INR n/a    Orders confirmed, vessel patency confirmed with ultrasound. Risks and benefits of procedure explained to patient and education regarding line associated bloodstream infections provided. Questions answered.     PowerGlide Midline placed in LUE per MD order with ultrasound guidance. 20g, 10 cm line placed in brachial vein after 1 attempt(s).  Catheter inserted with good blood return. Secured with 0cm external from insertion site.  Flushed without resistance with 10 mL 0.9% normal saline. Midline secured with Biopatch and Tegaderm.     Midline placement is confirmed by nurse using ultrasound and ability to flush and draw blood. Midline is appropriate for use at this time.  No X-ray is needed for placement confirmation. Pt tolerated procedure well.  Patient condition relayed to unit RN or ordering physician via this post procedure note in the EMR.    Ultrasound images uploaded to PACS and viewable in the EMR - yes  Ultrasound imaged printed and placed in paper chart - no      BARD PowerGlide Midline ref # [id number], Lot # [id number]

## 2018-12-29 NOTE — PROGRESS NOTES
· 2 RN skin check complete.   · Devices in place: iIleostomy bag.  · Skin assessed under devices: patient refused assessment of stoma or assessment under illeostomy bag.  · Confirmed pressure ulcers found on: N/A  · New potential pressure ulcers noted on: N/A.  · The following interventions in place: Linens kept clean and dry, frequent turns encouraged.    2 RN skin assessment completed with CAMERON Christina.  Blanchable redness noted to nose and ears under eyeglass.  Elbows red/blanching.  Bruising noted to upper extremities.  Red spots noted to bilateral lower extremities.  Right heel red/blanching.  Left heel pink/blanching.  Sacrum dry, pink, and blanching.

## 2018-12-29 NOTE — PROGRESS NOTES
Patient was seen and examined.  She describes obstruction point right at ostomy and a fistula. Vital signs, imaging and labs reviewed.  Counseled patient on diet, actively level and progress this far.  Questions answered with spouse. Cannot tolerate PO contrast. Plan ileoscopy and surgical revision of ostomy, possible ex lap.

## 2018-12-29 NOTE — PROGRESS NOTES
Assumed care of Pt at shift change. Pt is A&Ox4. Pt reports pain and was given rest. Waiting for vascular access before the pt can be given IV medications. Pt refuses to take oral medication due to nausea.  Call light with in reach. Traction socks on pt and bed in lowest position.

## 2018-12-29 NOTE — PROGRESS NOTES
Paged MD Barbosa regarding lack of iv access despite multiple attempts by multiple people. Per MD Barbosa's orders, placed order for midline placement. Vascular access team called and message left regarding the situation. Will relay to day shift to follow up with vascular team regarding possible midline placement.

## 2018-12-30 LAB
ALBUMIN SERPL BCP-MCNC: 3.2 G/DL (ref 3.2–4.9)
ALBUMIN/GLOB SERPL: 1.1 G/DL
ALP SERPL-CCNC: 82 U/L (ref 30–99)
ALT SERPL-CCNC: 21 U/L (ref 2–50)
ANION GAP SERPL CALC-SCNC: 11 MMOL/L (ref 0–11.9)
AST SERPL-CCNC: 16 U/L (ref 12–45)
BASOPHILS # BLD AUTO: 0.1 % (ref 0–1.8)
BASOPHILS # BLD: 0.01 K/UL (ref 0–0.12)
BILIRUB SERPL-MCNC: 0.7 MG/DL (ref 0.1–1.5)
BUN SERPL-MCNC: 12 MG/DL (ref 8–22)
CALCIUM SERPL-MCNC: 8.9 MG/DL (ref 8.5–10.5)
CHLORIDE SERPL-SCNC: 109 MMOL/L (ref 96–112)
CO2 SERPL-SCNC: 22 MMOL/L (ref 20–33)
CREAT SERPL-MCNC: 0.75 MG/DL (ref 0.5–1.4)
EOSINOPHIL # BLD AUTO: 0 K/UL (ref 0–0.51)
EOSINOPHIL NFR BLD: 0 % (ref 0–6.9)
ERYTHROCYTE [DISTWIDTH] IN BLOOD BY AUTOMATED COUNT: 43.5 FL (ref 35.9–50)
GLOBULIN SER CALC-MCNC: 2.9 G/DL (ref 1.9–3.5)
GLUCOSE SERPL-MCNC: 106 MG/DL (ref 65–99)
HCT VFR BLD AUTO: 35.8 % (ref 37–47)
HGB BLD-MCNC: 12 G/DL (ref 12–16)
IMM GRANULOCYTES # BLD AUTO: 0.05 K/UL (ref 0–0.11)
IMM GRANULOCYTES NFR BLD AUTO: 0.5 % (ref 0–0.9)
LYMPHOCYTES # BLD AUTO: 0.67 K/UL (ref 1–4.8)
LYMPHOCYTES NFR BLD: 6.2 % (ref 22–41)
MCH RBC QN AUTO: 31.1 PG (ref 27–33)
MCHC RBC AUTO-ENTMCNC: 33.5 G/DL (ref 33.6–35)
MCV RBC AUTO: 92.7 FL (ref 81.4–97.8)
MONOCYTES # BLD AUTO: 0.53 K/UL (ref 0–0.85)
MONOCYTES NFR BLD AUTO: 4.9 % (ref 0–13.4)
NEUTROPHILS # BLD AUTO: 9.61 K/UL (ref 2–7.15)
NEUTROPHILS NFR BLD: 88.3 % (ref 44–72)
NRBC # BLD AUTO: 0 K/UL
NRBC BLD-RTO: 0 /100 WBC
PLATELET # BLD AUTO: 198 K/UL (ref 164–446)
PMV BLD AUTO: 10.6 FL (ref 9–12.9)
POTASSIUM SERPL-SCNC: 4 MMOL/L (ref 3.6–5.5)
PROT SERPL-MCNC: 6.1 G/DL (ref 6–8.2)
RBC # BLD AUTO: 3.86 M/UL (ref 4.2–5.4)
SODIUM SERPL-SCNC: 142 MMOL/L (ref 135–145)
WBC # BLD AUTO: 10.9 K/UL (ref 4.8–10.8)

## 2018-12-30 PROCEDURE — 700111 HCHG RX REV CODE 636 W/ 250 OVERRIDE (IP): Performed by: NURSE PRACTITIONER

## 2018-12-30 PROCEDURE — 700111 HCHG RX REV CODE 636 W/ 250 OVERRIDE (IP): Performed by: COLON & RECTAL SURGERY

## 2018-12-30 PROCEDURE — 700102 HCHG RX REV CODE 250 W/ 637 OVERRIDE(OP): Performed by: INTERNAL MEDICINE

## 2018-12-30 PROCEDURE — A9270 NON-COVERED ITEM OR SERVICE: HCPCS | Performed by: NURSE PRACTITIONER

## 2018-12-30 PROCEDURE — 99233 SBSQ HOSP IP/OBS HIGH 50: CPT | Performed by: INTERNAL MEDICINE

## 2018-12-30 PROCEDURE — 770006 HCHG ROOM/CARE - MED/SURG/GYN SEMI*

## 2018-12-30 PROCEDURE — 700101 HCHG RX REV CODE 250: Performed by: NURSE PRACTITIONER

## 2018-12-30 PROCEDURE — 665999 HH PPS REVENUE DEBIT

## 2018-12-30 PROCEDURE — 80053 COMPREHEN METABOLIC PANEL: CPT

## 2018-12-30 PROCEDURE — 700111 HCHG RX REV CODE 636 W/ 250 OVERRIDE (IP): Performed by: HOSPITALIST

## 2018-12-30 PROCEDURE — 36415 COLL VENOUS BLD VENIPUNCTURE: CPT

## 2018-12-30 PROCEDURE — 700102 HCHG RX REV CODE 250 W/ 637 OVERRIDE(OP): Performed by: HOSPITALIST

## 2018-12-30 PROCEDURE — 85025 COMPLETE CBC W/AUTO DIFF WBC: CPT

## 2018-12-30 PROCEDURE — A9270 NON-COVERED ITEM OR SERVICE: HCPCS | Performed by: INTERNAL MEDICINE

## 2018-12-30 PROCEDURE — 665998 HH PPS REVENUE CREDIT

## 2018-12-30 PROCEDURE — A9270 NON-COVERED ITEM OR SERVICE: HCPCS | Performed by: HOSPITALIST

## 2018-12-30 PROCEDURE — 700102 HCHG RX REV CODE 250 W/ 637 OVERRIDE(OP): Performed by: NURSE PRACTITIONER

## 2018-12-30 RX ORDER — MIDAZOLAM HYDROCHLORIDE 1 MG/ML
1 INJECTION INTRAMUSCULAR; INTRAVENOUS
Status: DISCONTINUED | OUTPATIENT
Start: 2018-12-30 | End: 2018-12-30

## 2018-12-30 RX ORDER — HALOPERIDOL 5 MG/ML
2 INJECTION INTRAMUSCULAR EVERY 4 HOURS PRN
Status: DISCONTINUED | OUTPATIENT
Start: 2018-12-30 | End: 2019-01-08 | Stop reason: HOSPADM

## 2018-12-30 RX ADMIN — Medication: at 08:50

## 2018-12-30 RX ADMIN — ENOXAPARIN SODIUM 30 MG: 100 INJECTION SUBCUTANEOUS at 17:24

## 2018-12-30 RX ADMIN — POTASSIUM CHLORIDE AND SODIUM CHLORIDE: 900; 150 INJECTION, SOLUTION INTRAVENOUS at 06:27

## 2018-12-30 RX ADMIN — METOPROLOL TARTRATE 25 MG: 25 TABLET, FILM COATED ORAL at 17:24

## 2018-12-30 RX ADMIN — ONDANSETRON HYDROCHLORIDE 4 MG: 2 INJECTION, SOLUTION INTRAMUSCULAR; INTRAVENOUS at 00:46

## 2018-12-30 RX ADMIN — METOPROLOL TARTRATE 25 MG: 25 TABLET, FILM COATED ORAL at 08:59

## 2018-12-30 RX ADMIN — ONDANSETRON HYDROCHLORIDE 4 MG: 2 INJECTION, SOLUTION INTRAMUSCULAR; INTRAVENOUS at 17:23

## 2018-12-30 RX ADMIN — ONDANSETRON HYDROCHLORIDE 4 MG: 2 INJECTION, SOLUTION INTRAMUSCULAR; INTRAVENOUS at 12:12

## 2018-12-30 RX ADMIN — ONDANSETRON HYDROCHLORIDE 4 MG: 2 INJECTION, SOLUTION INTRAMUSCULAR; INTRAVENOUS at 23:05

## 2018-12-30 RX ADMIN — HALOPERIDOL LACTATE 2 MG: 5 INJECTION, SOLUTION INTRAMUSCULAR at 12:10

## 2018-12-30 RX ADMIN — POTASSIUM CHLORIDE AND SODIUM CHLORIDE: 900; 150 INJECTION, SOLUTION INTRAVENOUS at 16:37

## 2018-12-30 ASSESSMENT — ENCOUNTER SYMPTOMS
DEPRESSION: 0
MEMORY LOSS: 0
FOCAL WEAKNESS: 0
WHEEZING: 0
HEADACHES: 0
ABDOMINAL PAIN: 1
EYES NEGATIVE: 1
NECK PAIN: 1
PALPITATIONS: 1
COUGH: 0
CHILLS: 0
DIARRHEA: 0
NECK PAIN: 0
VOMITING: 0
BACK PAIN: 0
CONSTIPATION: 1
FEVER: 0
WEAKNESS: 1
WEIGHT LOSS: 0
TREMORS: 0
HEARTBURN: 0
SHORTNESS OF BREATH: 0
EYE PAIN: 0
NAUSEA: 1
RESPIRATORY NEGATIVE: 1
NERVOUS/ANXIOUS: 1
SPUTUM PRODUCTION: 0
DIZZINESS: 0
FALLS: 0
FLANK PAIN: 0
PND: 0
CARDIOVASCULAR NEGATIVE: 1
NEUROLOGICAL NEGATIVE: 1
BLURRED VISION: 0
SPEECH CHANGE: 0
SEIZURES: 0

## 2018-12-30 ASSESSMENT — PAIN SCALES - GENERAL
PAINLEVEL_OUTOF10: 8
PAINLEVEL_OUTOF10: 9
PAINLEVEL_OUTOF10: 8

## 2018-12-30 ASSESSMENT — LIFESTYLE VARIABLES: SUBSTANCE_ABUSE: 0

## 2018-12-30 NOTE — CARE PLAN
Problem: Safety  Goal: Will remain free from falls  Outcome: PROGRESSING AS EXPECTED  Safety education provided. Bed is locked and in lowest position. Call light and belongings are within reach.

## 2018-12-30 NOTE — WOUND TEAM
"Consult received for new ostomy. Patient with ileostomy previously for numerous years, had ileostomy takedown, revision of parastomal hernia and recreation of new ileostomy yesterday with Dr. Cruz. Appliance is intact with small amount of liquid green stool. Patient normally uses 7/8\" Convatec pre cut convex barrier, this will no longer fit. Patient is willing to try [place] products that are in place. Wound team to return tomorrow for appliance change and teaching of new product use. Supplies for appliance change left at bedside.  "

## 2018-12-30 NOTE — CONSULTS
DATE OF SERVICE:  12/29/2018    CHIEF COMPLAINT:  Abdominal pain and vomiting.    HISTORY OF PRESENT ILLNESS:  The patient is a 63-year-old female with a prior   history of proctocolectomy for Crohn's colitis who has had an ileostomy for a   long time.  She describes that in the last few months, she has had   intermittent, but increasingly frequent severe and continuous symptoms of pain   and obstruction that she feels was very close to the ileostomy itself.  She   describes that she will eat the food and she feels as though it passes through   her intestine, but then reaches the ileostomy itself, at which point, there   is a lot of pain and no output, then followed by a squirting type explosive   very liquid output.  She has intermittently come to the hospital and has had   CT scans performed which have shown no sign of recurrent small bowel Crohn's   disease and have shown no abscess or site of obstruction.  She was evaluated   at [facility], she describes that she was told by the surgeon and   GI doctor that she would need surgery for an obstruction at the ileostomy.    The CT scan on 12/04 showed very mild distention of the proximal jejunum with   possible focal transition in the anterior left abdomen, but no inflammatory   stranding.    Of note, she also thinks that she might have seen a very small fistula tract   on the superior aspect of the peristomal tissue.  She has not had any   peristomal abscesses and is not known to have had fistulizing Crohn's around   the ileum prior to this.  She denies any fevers or chills.  She struggled with   anasarca and at times has had weight put on which she said was simply fluid,   but then otherwise has lost weight.  She has maintained her albumin and her   hemoglobin, but she says anytime she tries to eat or drink lately, she has had   severe pain around the ileostomy with vomiting.  She says she is simply   unable to tolerate any oral contrast for potential  contrast study.  She thinks   that the parastomal hernia has gotten somewhat larger.    PAST MEDICAL HISTORY:  Crohn's colitis, proctocolectomy, spine degenerative   joint disease and multiple spinal operations, chronic pain, COPD, postherpetic   neuralgia, seizure disorder, cervical spine surgery.  Parastomal hernia.    ALLERGIES:  List is reviewed.    MEDICATIONS:  Include Lasix, albuterol, Zyrtec, Estrace, granisetron, DuoNeb,   Lopressor, Zofran, oxycodone, spironolactone.  Remainder of the medication   list is reviewed.      SOCIAL HISTORY:  She denies tobacco use.  Rare alcohol use.  She has been    for a long time.    FAMILY HISTORY:  Heart disease in her father, lung disease in mother, diabetes   in father and sister.  No family history of colorectal cancer.    REVIEW OF SYSTEMS:  NEUROLOGIC:  Denies recent seizures.  Has no stroke history.  CARDIAC:  Denies chest pain or palpitations.  LUNGS:  Has no shortness breath, coughing or wheezing.  Has stable pulmonary   disease.  GASTROINTESTINAL:  Symptoms as described above.  GENITOURINARY:  Denies dysuria or hematuria.  MUSCULOSKELETAL:  Has chronic back and joint pain.  EYES:  No recent visual changes.  EARS:  No hearing aids, hearing loss, balance troubles.  PSYCHIATRIC:  Has anxiety.  Denies any recent changes in mental health.    PHYSICAL EXAMINATION:  GENERAL:  Nontoxic.  VITAL SIGNS:  Afebrile, heart rate in the 50s-70s, respiratory rate 12-14,   temperature 36.2.  HEENT:  Eyes are anicteric, extraocular movements intact.  Nose and ears   externally normal.  Hearing grossly normal.  Oropharynx clear.  NECK:  Supple, good range of motion.  CHEST AND RESPIRATORY:  Unlabored breathing, normal excursions.  CARDIOVASCULAR:  Regular rate and rhythm.  ABDOMEN:  Soft.  There is no real obvious distention, although there might be   some very subtle distention.  There are multiple healed scars including   midline, transverse.  She has a left-sided ileostomy  which does have liquid   output in the bag.  She does have tenderness over the periostomy area and mid   abdomen.  No real rebound, guarding or masses.  There is probable parastomal   hernia.  EXTREMITIES:  Warm and acyanotic with 1+ ankle edema.  NEUROLOGIC:  Nonfocal with normal power and strength throughout.  Mood, affect   and judgment appear within normal limits at her baseline.    LABORATORY STUDIES:  Show white blood count on presentation of 12.7,   hemoglobin 13, and albumin 4.2.  The remainder of the laboratory studies   reviewed as depicted.    IMAGING:  Past CT scans reviewed with the findings as described above.    Ileostomy itself exhibits the output as I mentioned no redness or sign of   abscess or fistula.    IMPRESSION:  1.  Vomiting, abdominal pain around the stoma site after eating.  2.  Past history of Crohn's colitis and proctocolectomy.  3.  Degenerative joint disease.  4.  Chronic pain syndrome.  5.  Chronic obstructive pulmonary disease.  6.  Postherpetic neuralgia.  7.  Seizure disorder.  8.  Anxiety.    PLAN:  We talked at length about potential avenues for investigation, I   initially proposed oral contrast for small bowel follow through, but she   declines due to intolerance of the oral contrast.  We discussed potential   options for further evaluation including ileoscopy using a flexible   sigmoidoscope and then ways to correct what certainly appears to be an   obstructive process at the ileostomy.  We discussed ileostomy revision   surgically which may require a circumstomal incision and local revision versus   a midline exploratory laparotomy and possible full revision recreation or   relocation.  We discussed the pros and cons, and risks of all them including   recurrent or persistent obstruction, bowel injury, abscess, fistula, bowel,   bladder, ureteral or other organ injury, fistula or abscess, difficulties with   nutrition and other GI function, other potential long and short term    complications.  She agrees to plan we outlined which involved examination   under anesthesia, flexible sigmoidoscope to perform ileoscopy followed by   surgical revision based on the findings, which may include local or lower   abdominal laparotomy approach.  We discussed potential repair of parastomal   hernia.       ____________________________________     MD JACLYN JAMA / TUCKER    DD:  12/29/2018 17:23:59  DT:  12/29/2018 20:06:32    D#:  [id number]  Job#:  [id number]    cc: TYSON MARTINEZ MD, BRISEIDA MCNULTY MD

## 2018-12-30 NOTE — PROGRESS NOTES
Surgical Progress Note    Author: Norma Murphyamina Date & Time created: 2018   9:47 AM     Interval Events:  POD#1, exploratory laparotomy, adhesiolysis, takedown of ileostomy, repair of parastomal hernia with placement of Gentrix xenograft hernia matrix, recreation of new ileostomy, ileoscopy using flexible sigmoidoscope.  Not taking sips due to dysphagia, nausea, no vomiting. Admits to typical incisional abdominal pain, controlled with medication. Pt is ambulating and voiding.   Review of Systems   Constitutional: Negative for chills and fever.   HENT: Negative.    Eyes: Negative.    Respiratory: Negative.    Cardiovascular: Negative.    Gastrointestinal: Positive for abdominal pain and nausea. Negative for vomiting.        Reports dysphagia at home.  Denies flatus.   Genitourinary: Negative.    Musculoskeletal: Positive for neck pain.   Skin: Negative.    Neurological: Negative.    Endo/Heme/Allergies: Negative.    Psychiatric/Behavioral: The patient is nervous/anxious.      Hemodynamics:  Temp (24hrs), Av.5 °C (97.7 °F), Min:36.3 °C (97.3 °F), Max:36.7 °C (98.1 °F)  Temperature: 36.4 °C (97.5 °F)  Pulse  Av.2  Min: 52  Max: 126Heart Rate (Monitored): (!) 57  Blood Pressure : 115/55, NIBP: (!) 93/40     Respiratory:    Respiration: 18, Pulse Oximetry: 95 %        RUL Breath Sounds: Clear, RML Breath Sounds: Clear, RLL Breath Sounds: Diminished, JACQUI Breath Sounds: Clear, LLL Breath Sounds: Diminished  Neuro:  GCS       Fluids:    Intake/Output Summary (Last 24 hours) at 18 0947  Last data filed at 18 0600   Gross per 24 hour   Intake             2130 ml   Output              875 ml   Net             1255 ml        Current Diet Order   Procedures   • Diet Order Clear Liquid, Clear Liquids - No Red Foods     Physical Exam   Constitutional: She is oriented to person, place, and time. She appears well-developed and well-nourished.   HENT:   Head: Normocephalic.   Eyes: Conjunctivae are  normal.   Neck: Normal range of motion.   Cardiovascular: Normal rate.    Pulmonary/Chest: Effort normal.   Abdominal: Soft. She exhibits distension. There is tenderness.   Incision intact, some sanguineous drainage from penrose.  Ileostomy with liquid bilious output.   Musculoskeletal: Normal range of motion.   Neurological: She is alert and oriented to person, place, and time.   Skin: Skin is warm and dry.   Psychiatric:   Manic speech     Labs:  Recent Results (from the past 24 hour(s))   CBC WITH DIFFERENTIAL    Collection Time: 12/30/18  4:11 AM   Result Value Ref Range    WBC 10.9 (H) 4.8 - 10.8 K/uL    RBC 3.86 (L) 4.20 - 5.40 M/uL    Hemoglobin 12.0 12.0 - 16.0 g/dL    Hematocrit 35.8 (L) 37.0 - 47.0 %    MCV 92.7 81.4 - 97.8 fL    MCH 31.1 27.0 - 33.0 pg    MCHC 33.5 (L) 33.6 - 35.0 g/dL    RDW 43.5 35.9 - 50.0 fL    Platelet Count 198 164 - 446 K/uL    MPV 10.6 9.0 - 12.9 fL    Neutrophils-Polys 88.30 (H) 44.00 - 72.00 %    Lymphocytes 6.20 (L) 22.00 - 41.00 %    Monocytes 4.90 0.00 - 13.40 %    Eosinophils 0.00 0.00 - 6.90 %    Basophils 0.10 0.00 - 1.80 %    Immature Granulocytes 0.50 0.00 - 0.90 %    Nucleated RBC 0.00 /100 WBC    Neutrophils (Absolute) 9.61 (H) 2.00 - 7.15 K/uL    Lymphs (Absolute) 0.67 (L) 1.00 - 4.80 K/uL    Monos (Absolute) 0.53 0.00 - 0.85 K/uL    Eos (Absolute) 0.00 0.00 - 0.51 K/uL    Baso (Absolute) 0.01 0.00 - 0.12 K/uL    Immature Granulocytes (abs) 0.05 0.00 - 0.11 K/uL    NRBC (Absolute) 0.00 K/uL   COMP METABOLIC PANEL    Collection Time: 12/30/18  4:11 AM   Result Value Ref Range    Sodium 142 135 - 145 mmol/L    Potassium 4.0 3.6 - 5.5 mmol/L    Chloride 109 96 - 112 mmol/L    Co2 22 20 - 33 mmol/L    Anion Gap 11.0 0.0 - 11.9    Glucose 106 (H) 65 - 99 mg/dL    Bun 12 8 - 22 mg/dL    Creatinine 0.75 0.50 - 1.40 mg/dL    Calcium 8.9 8.5 - 10.5 mg/dL    AST(SGOT) 16 12 - 45 U/L    ALT(SGPT) 21 2 - 50 U/L    Alkaline Phosphatase 82 30 - 99 U/L    Total Bilirubin 0.7 0.1 -  1.5 mg/dL    Albumin 3.2 3.2 - 4.9 g/dL    Total Protein 6.1 6.0 - 8.2 g/dL    Globulin 2.9 1.9 - 3.5 g/dL    A-G Ratio 1.1 g/dL   ESTIMATED GFR    Collection Time: 12/30/18  4:11 AM   Result Value Ref Range    GFR If African American >60 >60 mL/min/1.73 m 2    GFR If Non African American >60 >60 mL/min/1.73 m 2     Medical Decision Making, by Problem:  Active Hospital Problems    Diagnosis   • 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 Breathing unlabored. Will advance diet to thickened clears.  Encouraged to take Metoprolol as tachycardic. Abdomen is soft, mildly distended, tender at incision sites. Incisions C/D/I. VS stable aside from asymptomatic hypotention. Labs reviewed. Encouraged ambulation and incentive spirometry. Transition to oral pain medications.  Wean O2. Anxious with manic speech, may try Versed.  Seen and examined by Dr. Cruz. Discussed with Dr. Cruz.    Quality Measures:  Quality-Core Measures   Reviewed items::  Labs reviewed and Medications reviewed  Sage catheter::  No Sage  DVT prophylaxis pharmacological::  Enoxaparin (Lovenox)      Discussed patient condition with Family, RN, Patient and general surgery-Dr. Cruz.

## 2018-12-30 NOTE — PROGRESS NOTES
Pt A&Ox4, sitting up, anxious, constantly fidgeting.  at bedside.  Midline incision with penrose drain, dressing in place. Small drainage.  Ileostomy L quadrant, small liquid output.  Diet to be advanced to clear liquid nectar thick per pt at home as she has dysphagia, denies n/v. Hypoactive bowel sounds, denies flatus.  Pt tachycardic intermittently, ranging . Regular rate at this time. Pt previously refusing to take pills, but agreeable to try one with nectar thick liquids, metoprolol administered.   Saturating >90% on RA.  Pt ambulates 1x, generalized weakness.   Updated on plan of care. Safety education provided. Bed locked in low. Call light within reach. Rounding in place.

## 2018-12-30 NOTE — CARE PLAN
Problem: Pain Management  Goal: Pain level will decrease to patient's comfort goal  Outcome: PROGRESSING AS EXPECTED  Monitor pain levels. PCA in use. Provide distraction techniques. Reposition patient.

## 2018-12-30 NOTE — OR NURSING
REPORT TO SANDI BARNES    PT IS SLEEPY. AROUSED BY VOICE AND TOUCH. ABLE TO STATE NAME.    DR AUGUSTINE PERFORMED ILEOSTOMY REVISION, EXPLORATORY  LAPAROTOMY, LYSIS OF ADHESIONS. MID ABDOMINAL INCISION W/ GAUZE AND MEDIPORE TAPE. SHADOWING OF DRAINAGE SEEN  DISTAL DRESSING. LLQ ILIEOSOTMY W. SCANT CLEAR DRAINAGE. PENROSE UNDER DRESSING.

## 2018-12-30 NOTE — PROGRESS NOTES
"[facility] Medicine Daily Progress Note    Date of Service  12/30/2018    Chief Complaint  Abdominal pain    Hospital Course    63 y.o. female with PMH Crohn's with previous colectomy with ileostomy admitted 12/28/2018 with generalized abdominal pain, nausea decreased output from her ileostomy.  Recently hospitalized at [facility] and evaluated by Dr. Nunez.  Patient was evaluated by surgery.  Patient underwent surgery ostomy revision and takedown of a new ostomy creation 12/29.  Patient tolerated procedure very well.      Interval Problem Update  12/30.  Patient still complains of abdominal pain. Patient's pain is local, 6-8/10, intermittent and does not radiate to other location, sharp and with some tingling. Can be controlled by pain meds. Dressing in place.  Evaluated by surgery.  Encouraged to take p.o. and ambulating.  Pain medication changed to p.o.    Consultants/Specialty  -General surgery    Code Status  Full    Disposition  TBD    Review of Systems  Review of Systems   Constitutional: Positive for malaise/fatigue. Negative for chills, fever and weight loss.   HENT: Negative.  Negative for congestion, ear discharge, ear pain, hearing loss and nosebleeds.    Eyes: Negative.  Negative for blurred vision and pain.   Respiratory: Negative for cough, sputum production, shortness of breath and wheezing.    Cardiovascular: Positive for palpitations. Negative for chest pain, leg swelling and PND.   Gastrointestinal: Positive for abdominal pain, constipation and nausea. Negative for diarrhea, heartburn and vomiting.   Genitourinary: Negative for dysuria, flank pain, frequency and hematuria.        \"I do a lot of Kegel's and that helps me urinate\"   Musculoskeletal: Negative for back pain, falls, joint pain and neck pain.   Skin: Negative for rash.   Neurological: Positive for weakness. Negative for dizziness, tremors, speech change, focal weakness, seizures and headaches.   Psychiatric/Behavioral: Negative for " depression, memory loss, substance abuse and suicidal ideas. The patient is nervous/anxious.         Physical Exam  Temp:  [35.9 °C (96.6 °F)-36.7 °C (98.1 °F)] 36.3 °C (97.3 °F)  Pulse:  [] 64  Resp:  [13-21] 14  BP: ()/(48-69) 118/49    Physical Exam   Constitutional: She is oriented to person, place, and time. Vital signs are normal. She appears well-developed and well-nourished. She is cooperative. She has a sickly appearance. No distress.   HENT:   Head: Normocephalic.   Right Ear: Hearing normal.   Left Ear: Hearing normal.   Nose: Nose normal.   Mouth/Throat: Oropharynx is clear and moist and mucous membranes are normal. No oropharyngeal exudate.   Eyes: Pupils are equal, round, and reactive to light. Conjunctivae and EOM are normal.   Neck: Normal range of motion and phonation normal. Neck supple. No JVD present. No thyromegaly present.   Cardiovascular: Regular rhythm, normal heart sounds and intact distal pulses.  Tachycardia present.  Exam reveals no gallop and no friction rub.    No murmur heard.  No edema  Cap refill WNL   Pulmonary/Chest: Effort normal and breath sounds normal. No respiratory distress. She has no wheezes. She has no rhonchi. She has no rales.   Abdominal: Soft. Normal appearance. She exhibits no distension and no mass. Bowel sounds are decreased. There is generalized tenderness. There is no rigidity, no rebound and no guarding.   Ileostomy with no output noted in bag.  Unable to visualize stoma due to appliance   Musculoskeletal: Normal range of motion. She exhibits no edema or tenderness.   Lymphadenopathy:     She has no cervical adenopathy.   Neurological: She is alert and oriented to person, place, and time. She has normal strength. No cranial nerve deficit. GCS eye subscore is 4. GCS verbal subscore is 5. GCS motor subscore is 6.   LEÓN 5/5   Skin: Skin is warm and dry. No rash noted. There is pallor.   Psychiatric: Her behavior is normal. Judgment normal. Her mood  appears anxious. Her speech is rapid and/or pressured.   Nursing note and vitals reviewed.      Fluids    Intake/Output Summary (Last 24 hours) at 12/30/18 1445  Last data filed at 12/30/18 1130   Gross per 24 hour   Intake             2130 ml   Output             1175 ml   Net              955 ml       Laboratory  Recent Labs      12/28/18   1021  12/29/18   0233  12/30/18   0411   WBC  12.7*  8.6  10.9*   RBC  4.28  4.13*  3.86*   HEMOGLOBIN  13.0  12.5  12.0   HEMATOCRIT  39.6  37.8  35.8*   MCV  92.5  91.5  92.7   MCH  30.4  30.3  31.1   MCHC  32.8*  33.1*  33.5*   RDW  44.8  44.3  43.5   PLATELETCT  213  202  198   MPV  10.6  10.5  10.6     Recent Labs      12/28/18   1021  12/29/18   0233  12/30/18   0411   SODIUM  135  140  142   POTASSIUM  4.1  3.6  4.0   CHLORIDE  102  106  109   CO2  21  23  22   GLUCOSE  109*  97  106*   BUN  32*  16  12   CREATININE  1.10  0.78  0.75   CALCIUM  10.5  9.7  8.9                   Imaging  IR-US GUIDED PIV   Final Result    Ultrasound-guided PERIPHERAL IV INSERTION performed by    qualified nursing staff as above.            IR-MIDLINE CATHETER INSERTION >5 YRS    (Results Pending)        Assessment/Plan  * Intractable cyclical vomiting with nausea- (present on admission)   Assessment & Plan    -Still complains of vomiting and abdominal pain.  Status post surgery.  -Thick liquid diet advanced per recommendation by surgery.  -IV fluid hydration with KCl  -Close monitor electrolytes.     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HCC)- (present on admission)   Assessment & Plan    -ostomy revision and ex lap 12/29  Postoperatively patient tolerated.  Pain better controlled.  Continue pain medication       Crohn's disease of small intestine with complication (HCC)- (present on admission)   Assessment & Plan    -Followed as outpatient by Dr. Cruz  -Not currently in acute exacerbation  GI is consulted.  Continue to monitor.       History of infection with vancomycin resistant Enterococcus (VRE)-  (present on admission)   Assessment & Plan    -remote history of  No current infection.       History of MRSA infection- (present on admission)   Assessment & Plan    -Per chart review (+) MRSA nares 2014     Dysphasia- (present on admission)   Assessment & Plan    -History of  -Followed by GI as outpatient  -Aspiration precautions  -SLP eval  -Start patient on full liquid diet.     Hypokalemia- (present on admission)   Assessment & Plan    -Potassium 4.0   Resolved currently.  Continue IV fluid and advance diet.     DDD (degenerative disc disease), lumbar- (present on admission)   Assessment & Plan    -Chronic pain management     Paroxysmal atrial fibrillation (HCC)- (present on admission)   Assessment & Plan    -Not currently on chronic anticoagulation secondary to Crohn's  -Continue home metoprolol  -Baby ASA       Opioid type dependence, continuous (CMS-HCC)- (present on admission)   Assessment & Plan    -Continue home oxycodone     Chronic pain- (present on admission)   Assessment & Plan    -On chronic opioid management -Dr. Telles  -Bowel protocol          VTE prophylaxis: SCD and Lovenox.      I have discussed with RN and CM and SW and other consultants about patient's plan.       Current Facility-Administered Medications:   •  metoprolol (LOPRESSOR) tablet 25 mg, 25 mg, Oral, BID, Rachel Singletary M.D.  •  haloperidol lactate (HALDOL) injection 2 mg, 2 mg, Intravenous, Q4HRS PRN, Norma Lizama, A.P.R.N., 2 mg at 12/30/18 1210  •  enoxaparin (LOVENOX) inj 30 mg, 30 mg, Subcutaneous, Q12HRS, Kevin Cruz M.D.  •  scopolamine (TRANSDERM-SCOP) patch 1 Patch, 1 Patch, Transdermal, Q72HRS, Martha Jean-Baptiste M.D., 1 Patch at 12/29/18 0623  •  0.9 % NaCl with KCl 20 mEq infusion, , Intravenous, Continuous, Marya Zepeda, A.P.R.N., Last Rate: 100 mL/hr at 12/30/18 0627  •  estradiol (ESTRACE) CREA 0.5 g, 0.5 g, Vaginal, Q72HRS, Rod Valencia M.D.  •  polyethyl glycol-propyl glycol (SYSTANE) 0.4-0.3 % ophth  drops 1-2 Drop, 1-2 Drop, Both Eyes, TID PRN, Rod Valencia M.D., 2 Drop at 12/29/18 1321  •  HYDROmorphone (DILAUDID) 0.2 mg/mL in 50 mL NS (PCA), , Intravenous, Continuous **AND** [DISCONTINUED] Pharmacy Consult Request ...Pain Management Review 1 Each, 1 Each, Other, PRN, MARCO Cutler  •  albuterol inhaler 2 Puff, 2 Puff, Inhalation, Q6HRS PRN, Eduard Lopez M.D.  •  aspirin (ASA) chewable tab 81 mg, 81 mg, Oral, DAILY, Eduard Lopez M.D., Stopped at 12/29/18 0600  •  cetirizine (ZYRTEC) tablet 10 mg, 10 mg, Oral, QDAY PRN, Eduard Lopez M.D.  •  oxyCODONE immediate-release (ROXICODONE) tablet 30 mg, 30 mg, Oral, Q4HRS PRN, Eduard Lopez M.D.  •  senna-docusate (PERICOLACE or SENOKOT S) 8.6-50 MG per tablet 2 Tab, 2 Tab, Oral, BID, Stopped at 12/29/18 0600 **AND** polyethylene glycol/lytes (MIRALAX) PACKET 1 Packet, 1 Packet, Oral, QDAY PRN **AND** magnesium hydroxide (MILK OF MAGNESIA) suspension 30 mL, 30 mL, Oral, QDAY PRN **AND** bisacodyl (DULCOLAX) suppository 10 mg, 10 mg, Rectal, QDAY PRN, Eduard Lopez M.D.  •  ondansetron (ZOFRAN) syringe/vial injection 4 mg, 4 mg, Intravenous, Q4HRS PRN, Eduard Lopez M.D., 4 mg at 12/30/18 1212  •  ondansetron (ZOFRAN ODT) dispertab 4 mg, 4 mg, Oral, Q4HRS PRN, Eduard Lopez M.D.  •  acetaminophen (TYLENOL) tablet 650 mg, 650 mg, Oral, Q6HRS PRN, Eduard Lopez M.D.  •  Notify provider if pain remains uncontrolled, , , CONTINUOUS **AND** Use the numeric rating scale (NRS-11) on regular floors and Critical-Care Pain Observation Tool (CPOT) on ICUs/Trauma to assess pain, , , CONTINUOUS **AND** Pulse Ox (Oximetry), , , CONTINUOUS **AND** Pharmacy Consult Request ...Pain Management Review, , Other, PRN **AND** If patient difficult to arouse and/or has respiratory depression, stop any opiates that are currently infusing and call a Rapid Response., , , CONTINUOUS **AND** [DISCONTINUED] oxyCODONE  immediate-release (ROXICODONE) tablet 2.5 mg, 2.5 mg, Oral, Q3HRS PRN **AND** [DISCONTINUED] oxyCODONE immediate-release (ROXICODONE) tablet 5 mg, 5 mg, Oral, Q3HRS PRN **AND** HYDROmorphone pf (DILAUDID) injection 0.25 mg, 0.25 mg, Intravenous, Q3HRS PRN, Eduard Lopez M.D., 0.25 mg at 12/29/18 1302        Rachel Singletary M.D.

## 2018-12-30 NOTE — PROGRESS NOTES
Patient transferred from PACU in bed with transport. Report given.   Reporting pain of 7/10. PCA in use.   VSS /62   Pulse 68   Temp 36.5 °C (97.7 °F) (Temporal)   Resp 17   Ht 1.829 m (6')   Wt 76.6 kg (168 lb 14 oz)   SpO2 100%   Breastfeeding? No   BMI 22.90 kg/m²   A  & O x 4  2L oxy mask with saturation of 100%  IS 1250  Ostomy in place  Awaiting void  NPO  Skin: midline abdominal incision with 4 X $ metapore tape, RLQ ostomy, penrose in abdomen.   POC discussed for the evening. All questions answered at this time. Safety education provided. Bed is locked and in lowest position. Call light and belongings are within reach.

## 2018-12-30 NOTE — OP REPORT
DATE OF SERVICE:  12/29/2018    PREOPERATIVE DIAGNOSES:  1.  Small bowel obstruction at the ileostomy.  2.  Possible parastomal hernia.  3.  Multiple previous abdominal operations including proctocolectomy and   ileostomy for Crohn's colitis.    POSTOPERATIVE DIAGNOSES:  1.  Obstruction at the ileostomy.  2.  Parastomal hernia.  3.  Intra-abdominal adhesions.  4.  Findings of obstruction near the fascial level on ileoscopy.    OPERATIONS PERFORMED:  1.  Exploratory laparotomy.  2.  Adhesiolysis.  3.  Takedown of ileostomy.  4.  Repair of parastomal hernia with placement of Gentrix xenograft hernia   matrix.  5.  Recreation of new ileostomy.  6.  Ileoscopy using flexible sigmoidoscope.      SURGEON:  Kevin Cruz MD    ASSISTANT:  CUBA Cutler    ANESTHESIA:  Associated anesthesia.    INDICATIONS FOR PROCEDURE:  The patient is a 63-year-old female with a prior   history of total proctocolectomy for Crohn's colitis who has had an ileostomy   for years, but has developed obstructive symptoms localized to the ileostomy   or very near to it, comes today for investigation and surgical treatment.    DETAILS OF PROCEDURE:  After an extensive informed consent discussion process,   the patient was brought to the operating room.  She was placed in a supine   position on the operating table.  After induction of general anesthesia and   placement of an endotracheal tube, the abdomen was prepped and draped in usual   sterile fashion.  After administration of intravenous antibiotics, first   digital exam was performed of the ileostomy.  The ileostomy itself would allow   admission of a finger; however, below the skin and subcutaneous level, there   was obstruction at approximately the fascial level.  Next, the flexible   sigmoidoscope was well lubricated and introduced, it was only possible to   advance a short distance before encountering obstruction and it was simply not   possible to pass beyond this despite  curvature and repeated efforts.    The abdomen was prepped with iodine Betadine prep and prepped and draped in   usual sterile fashion.  A midline incision was then made.  Dissection was   carefully carried down through the old scar tissue and careful entry was made   into the abdomen without enterotomies.  Gradually, we were then able to free   up the anterior abdominal wall with careful adhesiolysis.  Slowly, we were   able to free up more of the small bowel loops until we had good freedom of the   distal ileum as this was heading to the ileostomy itself.  Careful   examination of the distal ileum showed no obstruction until it reached the   peritoneal layer and then indeed there was a parastomal hernia with a knuckle   of the bowel involved in a dense obstructing adhesion.  It was clear that this   would need to be recreated.    A circumstomal incision was made and dissection carried down to the abdominal   wall tissues to free up the old ileostomy.  We were then able to free entirely   and deliver it back to the midline.  Careful inspection demonstrated this   very dense scar around the distal ileum.  The abdomen was copiously irrigated   and inspection performed.  There had been no enterotomies or other adverse   events.  Seprafilm was placed over the viscera and around lateral   circumferentially around the new ileostomy.  The parastomal hernia was small   in dimension and was closed and then treated with a 10x15 cm Gentrix surgical   matrix, which was cut with a knife keyhole fenestration and placed in   intraperitoneal position.  The ileostomy was then delivered out through this   same aperture.  The old ileostomy was then excised including excision of   mesentery with control using the LigaSure device and this was passed off the   field for pathology.  We had developed plenty of length to have a nice   everting Joan ileostomy.  Next, we returned to the abdominal closure.    Additional Seprafilm was placed  over the viscera.  There had been no   intraabdominal spillage.  The fascia was closed with #1 Vicryl suture.  The   wound was irrigated and closed with skin staples over a quarter-inch   subcutaneous Penrose drain.    The new ileostomy was then created in everting fashion using 3-0 chromic   sutures in a nice Joan technique which resulted in a nice pink, well   perfused and nicely protuberant ileostomy.  Sterile dressings and a new stomal   appliance were applied.    Needle, sponge and instrument counts were correct.    ESTIMATED BLOOD LOSS:  Less than 50 mL.    COMPLICATIONS:  None.       ____________________________________     MD JACLYN JAMA / TUCKER    DD:  12/29/2018 17:30:39  DT:  12/29/2018 19:46:15    D#:  [id number]  Job#:  [id number]

## 2018-12-31 ENCOUNTER — APPOINTMENT (OUTPATIENT)
Dept: RADIOLOGY | Facility: MEDICAL CENTER | Age: 63
DRG: 330 | End: 2018-12-31
Attending: PHYSICIAN ASSISTANT
Payer: MEDICARE

## 2018-12-31 ENCOUNTER — HOME CARE VISIT (OUTPATIENT)
Dept: HOME HEALTH SERVICES | Facility: HOME HEALTHCARE | Age: 63
End: 2018-12-31
Payer: MEDICARE

## 2018-12-31 PROBLEM — I82.409 DVT (DEEP VENOUS THROMBOSIS) (HCC): Status: ACTIVE | Noted: 2018-12-31

## 2018-12-31 LAB
ANION GAP SERPL CALC-SCNC: 10 MMOL/L (ref 0–11.9)
APTT PPP: 31.2 SEC (ref 24.7–36)
BASOPHILS # BLD AUTO: 0.4 % (ref 0–1.8)
BASOPHILS # BLD: 0.04 K/UL (ref 0–0.12)
BUN SERPL-MCNC: 12 MG/DL (ref 8–22)
CALCIUM SERPL-MCNC: 9.4 MG/DL (ref 8.5–10.5)
CHLORIDE SERPL-SCNC: 110 MMOL/L (ref 96–112)
CO2 SERPL-SCNC: 19 MMOL/L (ref 20–33)
CREAT SERPL-MCNC: 0.8 MG/DL (ref 0.5–1.4)
EOSINOPHIL # BLD AUTO: 0.07 K/UL (ref 0–0.51)
EOSINOPHIL NFR BLD: 0.8 % (ref 0–6.9)
ERYTHROCYTE [DISTWIDTH] IN BLOOD BY AUTOMATED COUNT: 48.1 FL (ref 35.9–50)
GLUCOSE SERPL-MCNC: 106 MG/DL (ref 65–99)
HCT VFR BLD AUTO: 36 % (ref 37–47)
HGB BLD-MCNC: 11.5 G/DL (ref 12–16)
IMM GRANULOCYTES # BLD AUTO: 0.03 K/UL (ref 0–0.11)
IMM GRANULOCYTES NFR BLD AUTO: 0.3 % (ref 0–0.9)
INR PPP: 1.24 (ref 0.87–1.13)
LYMPHOCYTES # BLD AUTO: 2.1 K/UL (ref 1–4.8)
LYMPHOCYTES NFR BLD: 23.4 % (ref 22–41)
MCH RBC QN AUTO: 30.9 PG (ref 27–33)
MCHC RBC AUTO-ENTMCNC: 31.9 G/DL (ref 33.6–35)
MCV RBC AUTO: 96.8 FL (ref 81.4–97.8)
MONOCYTES # BLD AUTO: 0.87 K/UL (ref 0–0.85)
MONOCYTES NFR BLD AUTO: 9.7 % (ref 0–13.4)
NEUTROPHILS # BLD AUTO: 5.87 K/UL (ref 2–7.15)
NEUTROPHILS NFR BLD: 65.4 % (ref 44–72)
NRBC # BLD AUTO: 0 K/UL
NRBC BLD-RTO: 0 /100 WBC
PATHOLOGY CONSULT NOTE: NORMAL
PLATELET # BLD AUTO: 196 K/UL (ref 164–446)
PMV BLD AUTO: 10.5 FL (ref 9–12.9)
POTASSIUM SERPL-SCNC: 4.3 MMOL/L (ref 3.6–5.5)
PROTHROMBIN TIME: 15.7 SEC (ref 12–14.6)
RBC # BLD AUTO: 3.72 M/UL (ref 4.2–5.4)
SODIUM SERPL-SCNC: 139 MMOL/L (ref 135–145)
WBC # BLD AUTO: 9 K/UL (ref 4.8–10.8)

## 2018-12-31 PROCEDURE — 99233 SBSQ HOSP IP/OBS HIGH 50: CPT | Performed by: INTERNAL MEDICINE

## 2018-12-31 PROCEDURE — 665999 HH PPS REVENUE DEBIT

## 2018-12-31 PROCEDURE — 770006 HCHG ROOM/CARE - MED/SURG/GYN SEMI*

## 2018-12-31 PROCEDURE — 80048 BASIC METABOLIC PNL TOTAL CA: CPT

## 2018-12-31 PROCEDURE — A9270 NON-COVERED ITEM OR SERVICE: HCPCS | Performed by: INTERNAL MEDICINE

## 2018-12-31 PROCEDURE — 700102 HCHG RX REV CODE 250 W/ 637 OVERRIDE(OP): Performed by: HOSPITALIST

## 2018-12-31 PROCEDURE — 700111 HCHG RX REV CODE 636 W/ 250 OVERRIDE (IP): Performed by: NURSE PRACTITIONER

## 2018-12-31 PROCEDURE — 85730 THROMBOPLASTIN TIME PARTIAL: CPT

## 2018-12-31 PROCEDURE — 700102 HCHG RX REV CODE 250 W/ 637 OVERRIDE(OP): Performed by: INTERNAL MEDICINE

## 2018-12-31 PROCEDURE — 93970 EXTREMITY STUDY: CPT

## 2018-12-31 PROCEDURE — 85025 COMPLETE CBC W/AUTO DIFF WBC: CPT

## 2018-12-31 PROCEDURE — 700111 HCHG RX REV CODE 636 W/ 250 OVERRIDE (IP): Performed by: COLON & RECTAL SURGERY

## 2018-12-31 PROCEDURE — 700101 HCHG RX REV CODE 250: Performed by: NURSE PRACTITIONER

## 2018-12-31 PROCEDURE — A9270 NON-COVERED ITEM OR SERVICE: HCPCS | Performed by: HOSPITALIST

## 2018-12-31 PROCEDURE — 36415 COLL VENOUS BLD VENIPUNCTURE: CPT

## 2018-12-31 PROCEDURE — 700111 HCHG RX REV CODE 636 W/ 250 OVERRIDE (IP): Performed by: HOSPITALIST

## 2018-12-31 PROCEDURE — 85610 PROTHROMBIN TIME: CPT

## 2018-12-31 PROCEDURE — 700111 HCHG RX REV CODE 636 W/ 250 OVERRIDE (IP): Performed by: INTERNAL MEDICINE

## 2018-12-31 PROCEDURE — 665998 HH PPS REVENUE CREDIT

## 2018-12-31 RX ORDER — HEPARIN SODIUM 1000 [USP'U]/ML
3200 INJECTION, SOLUTION INTRAVENOUS; SUBCUTANEOUS PRN
Status: DISCONTINUED | OUTPATIENT
Start: 2018-12-31 | End: 2019-01-07

## 2018-12-31 RX ADMIN — POTASSIUM CHLORIDE AND SODIUM CHLORIDE: 900; 150 INJECTION, SOLUTION INTRAVENOUS at 13:05

## 2018-12-31 RX ADMIN — POTASSIUM CHLORIDE AND SODIUM CHLORIDE: 900; 150 INJECTION, SOLUTION INTRAVENOUS at 20:38

## 2018-12-31 RX ADMIN — ACETAMINOPHEN 650 MG: 325 TABLET, FILM COATED ORAL at 01:09

## 2018-12-31 RX ADMIN — ONDANSETRON HYDROCHLORIDE 4 MG: 2 INJECTION, SOLUTION INTRAMUSCULAR; INTRAVENOUS at 23:44

## 2018-12-31 RX ADMIN — ENOXAPARIN SODIUM 30 MG: 100 INJECTION SUBCUTANEOUS at 05:48

## 2018-12-31 RX ADMIN — Medication: at 17:50

## 2018-12-31 RX ADMIN — METOPROLOL TARTRATE 25 MG: 25 TABLET, FILM COATED ORAL at 17:49

## 2018-12-31 RX ADMIN — ONDANSETRON HYDROCHLORIDE 4 MG: 2 INJECTION, SOLUTION INTRAMUSCULAR; INTRAVENOUS at 10:02

## 2018-12-31 RX ADMIN — HEPARIN SODIUM 25000 UNITS: 5000 INJECTION, SOLUTION INTRAVENOUS; SUBCUTANEOUS at 17:46

## 2018-12-31 RX ADMIN — ONDANSETRON HYDROCHLORIDE 4 MG: 2 INJECTION, SOLUTION INTRAMUSCULAR; INTRAVENOUS at 14:59

## 2018-12-31 RX ADMIN — Medication: at 01:03

## 2018-12-31 RX ADMIN — ONDANSETRON HYDROCHLORIDE 4 MG: 2 INJECTION, SOLUTION INTRAMUSCULAR; INTRAVENOUS at 05:48

## 2018-12-31 RX ADMIN — ONDANSETRON HYDROCHLORIDE 4 MG: 2 INJECTION, SOLUTION INTRAMUSCULAR; INTRAVENOUS at 19:28

## 2018-12-31 RX ADMIN — POTASSIUM CHLORIDE AND SODIUM CHLORIDE: 900; 150 INJECTION, SOLUTION INTRAVENOUS at 01:11

## 2018-12-31 ASSESSMENT — ENCOUNTER SYMPTOMS
HEARTBURN: 0
PALPITATIONS: 0
SHORTNESS OF BREATH: 0
DIZZINESS: 0
PALPITATIONS: 1
CHILLS: 0
MEMORY LOSS: 0
BLURRED VISION: 0
ABDOMINAL PAIN: 1
VOMITING: 0
PND: 0
NAUSEA: 0
FEVER: 0
SPUTUM PRODUCTION: 0
SEIZURES: 0
COUGH: 0
FOCAL WEAKNESS: 0
EYES NEGATIVE: 1
DEPRESSION: 0
CONSTIPATION: 1
SPEECH CHANGE: 0
TREMORS: 0
NERVOUS/ANXIOUS: 1
NECK PAIN: 0
WEAKNESS: 1
DIARRHEA: 0
EYE PAIN: 0
WEIGHT LOSS: 0
NAUSEA: 1
FLANK PAIN: 0
HEADACHES: 0
CONSTIPATION: 0

## 2018-12-31 ASSESSMENT — PAIN SCALES - GENERAL
PAINLEVEL_OUTOF10: 7
PAINLEVEL_OUTOF10: 7
PAINLEVEL_OUTOF10: 8
PAINLEVEL_OUTOF10: 7
PAINLEVEL_OUTOF10: 8
PAINLEVEL_OUTOF10: 7
PAINLEVEL_OUTOF10: 7
PAINLEVEL_OUTOF10: 8

## 2018-12-31 ASSESSMENT — LIFESTYLE VARIABLES: SUBSTANCE_ABUSE: 0

## 2018-12-31 NOTE — PROGRESS NOTES
[facility] Medicine Daily Progress Note    Date of Service  12/31/2018    Chief Complaint  Abdominal pain    Hospital Course    63 y.o. female with PMH Crohn's with previous colectomy with ileostomy admitted 12/28/2018 with generalized abdominal pain, nausea decreased output from her ileostomy.  Recently hospitalized at [facility] and evaluated by Dr. Nunez.  Patient was evaluated by surgery.  Patient underwent surgery ostomy revision and takedown of a new ostomy creation 12/29.  Patient tolerated procedure very well.      Interval Problem Update  12/30.  Patient still complains of abdominal pain. Patient's pain is local, 6-8/10, intermittent and does not radiate to other location, sharp and with some tingling. Can be controlled by pain meds. Dressing in place.  Evaluated by surgery.  Encouraged to take p.o. and ambulating.  Pain medication changed to p.o.  12/31.  Patient is pleasant however complains of lower extremity more swelling compared to yesterday.  DVT evaluation showing acute DVT develop.  Needs surgery to be okay to start IV heparin drip.  Patient currently on Lovenox DVT prophylaxis.  Patient still complains of abdominal pain and wants PCA to continue.  Patient's pain is local, 6-8/10, intermittent and does not radiate to other location, sharp and with some tingling. Can be controlled by pain meds. Dressing in place.      Consultants/Specialty  -General surgery    Code Status  Full    Disposition  TBD    Review of Systems  Review of Systems   Constitutional: Positive for malaise/fatigue. Negative for fever and weight loss.   HENT: Negative.  Negative for congestion, ear discharge, ear pain and hearing loss.    Eyes: Negative.  Negative for blurred vision and pain.   Respiratory: Negative for cough and sputum production.    Cardiovascular: Positive for palpitations and leg swelling. Negative for chest pain and PND.   Gastrointestinal: Positive for abdominal pain, constipation and nausea. Negative for  "diarrhea.   Genitourinary: Negative for dysuria, flank pain, frequency and hematuria.        \"I do a lot of Kegel's and that helps me urinate\"   Musculoskeletal: Negative for neck pain.   Skin: Negative for rash.   Neurological: Positive for weakness. Negative for dizziness, tremors, speech change, focal weakness, seizures and headaches.   Psychiatric/Behavioral: Negative for depression, memory loss, substance abuse and suicidal ideas. The patient is nervous/anxious.         Physical Exam  Temp:  [36.2 °C (97.1 °F)-36.5 °C (97.7 °F)] 36.4 °C (97.6 °F)  Pulse:  [84-94] 90  Resp:  [17-18] 18  BP: (118-146)/(49-74) 128/58    Physical Exam   Constitutional: She is oriented to person, place, and time. Vital signs are normal. She appears well-developed. She is cooperative. She has a sickly appearance.   HENT:   Head: Normocephalic.   Right Ear: Hearing normal.   Left Ear: Hearing normal.   Nose: Nose normal.   Mouth/Throat: Oropharynx is clear and moist and mucous membranes are normal. No oropharyngeal exudate.   Eyes: Pupils are equal, round, and reactive to light. Conjunctivae and EOM are normal.   Neck: Normal range of motion and phonation normal. Neck supple. No JVD present. No thyromegaly present.   Cardiovascular: Regular rhythm, normal heart sounds and intact distal pulses.  Tachycardia present.  Exam reveals no gallop and no friction rub.    No murmur heard.  No edema  Cap refill WNL   Pulmonary/Chest: Effort normal and breath sounds normal. No respiratory distress. She has no rhonchi. She has no rales.   Abdominal: Soft. Normal appearance. She exhibits no distension and no mass. Bowel sounds are decreased. There is generalized tenderness. There is no rigidity and no rebound.   Ileostomy with no output noted in bag.  Unable to visualize stoma due to appliance   Musculoskeletal: Normal range of motion. She exhibits edema. She exhibits no tenderness.   Lymphadenopathy:     She has no cervical adenopathy. "   Neurological: She is alert and oriented to person, place, and time. She has normal strength. No cranial nerve deficit. GCS eye subscore is 4. GCS verbal subscore is 5. GCS motor subscore is 6.   LEÓN 5/5   Skin: Skin is warm and dry. No rash noted. She is not diaphoretic. There is pallor.   Psychiatric: Her behavior is normal. Judgment normal. Her mood appears anxious. Her speech is rapid and/or pressured.   Nursing note and vitals reviewed.      Fluids    Intake/Output Summary (Last 24 hours) at 12/31/18 1525  Last data filed at 12/31/18 1154   Gross per 24 hour   Intake           2525.2 ml   Output             1325 ml   Net           1200.2 ml       Laboratory  Recent Labs      12/29/18   0233  12/30/18   0411  12/31/18   0407   WBC  8.6  10.9*  9.0   RBC  4.13*  3.86*  3.72*   HEMOGLOBIN  12.5  12.0  11.5*   HEMATOCRIT  37.8  35.8*  36.0*   MCV  91.5  92.7  96.8   MCH  30.3  31.1  30.9   MCHC  33.1*  33.5*  31.9*   RDW  44.3  43.5  48.1   PLATELETCT  202  198  196   MPV  10.5  10.6  10.5     Recent Labs      12/29/18 0233  12/30/18   0411  12/31/18   0407   SODIUM  140  142  139   POTASSIUM  3.6  4.0  4.3   CHLORIDE  106  109  110   CO2  23  22  19*   GLUCOSE  97  106*  106*   BUN  16  12  12   CREATININE  0.78  0.75  0.80   CALCIUM  9.7  8.9  9.4                   Imaging  US-EXTREMITY VENOUS LOWER BILAT         IR-US GUIDED PIV   Final Result    Ultrasound-guided PERIPHERAL IV INSERTION performed by    qualified nursing staff as above.            IR-MIDLINE CATHETER INSERTION >5 YRS    (Results Pending)        Assessment/Plan  * Intractable cyclical vomiting with nausea- (present on admission)   Assessment & Plan    -No episodes of vomiting.  Ongoing nausea.  Patient not wanting to take any medications orally due to fear of vomiting.  -Tolerated surgery very well.  -IV fluid hydration with KCl       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HCC)- (present on admission)   Assessment & Plan    Likely to be the reason for nausea  vomiting.  Status post surgery 12/30  Tolerated procedure very well.  Continue monitor by surgery.  Continue PCA pump per surgery.     Crohn's disease of small intestine with complication (HCC)- (present on admission)   Assessment & Plan    -Followed as outpatient by Dr. Cruz  -Not currently in acute exacerbation       DVT (deep venous thrombosis) ([facility])   Assessment & Plan    Patient does have a history of DVT.  Ultrasound today showing newly developed a DVT.  Plan to put patient on IV heparin drip patient was on Lovenox prophylaxis treatment.  Need to have surgery to agree with heparin drip.  Likely patient has low postoperative bleeding risk.     History of infection with vancomycin resistant Enterococcus (VRE)- (present on admission)   Assessment & Plan    -remote history of     History of MRSA infection- (present on admission)   Assessment & Plan    -Per chart review (+) MRSA nares 2014     Dysphasia- (present on admission)   Assessment & Plan    -History of  -Followed by GI as outpatient  -Aspiration precautions  -SLP eval  -Resume diet per surgery.     Hypokalemia- (present on admission)   Assessment & Plan    -Likely due to vomiting  -Replacing  -Follow labs     DDD (degenerative disc disease), lumbar- (present on admission)   Assessment & Plan    -Chronic pain management     Paroxysmal atrial fibrillation (HCC)- (present on admission)   Assessment & Plan    -Not currently on chronic anticoagulation secondary to Crohn's  -Continue home metoprolol  -Baby ASA  -If patient started on heparin drip will stop aspirin.       Opioid type dependence, continuous (CMS-HCC)- (present on admission)   Assessment & Plan    -Continue home oxycodone     Chronic pain- (present on admission)   Assessment & Plan    -On chronic opioid management -Dr. Telles  -Bowel protocol            VTE prophylaxis: heparin      I have discussed with RN and LETTY and SW and other consultants about patient's plan.       Current  Facility-Administered Medications:   •  metoprolol (LOPRESSOR) tablet 25 mg, 25 mg, Oral, BID, Rachel Singletary M.D., 25 mg at 12/30/18 1724  •  haloperidol lactate (HALDOL) injection 2 mg, 2 mg, Intravenous, Q4HRS PRN, Norma Lizama A.P.R.N., 2 mg at 12/30/18 1210  •  enoxaparin (LOVENOX) inj 30 mg, 30 mg, Subcutaneous, Q12HRS, Kevin Cruz M.D., 30 mg at 12/31/18 0548  •  scopolamine (TRANSDERM-SCOP) patch 1 Patch, 1 Patch, Transdermal, Q72HRS, Martha Jean-Baptiste M.D., 1 Patch at 12/29/18 0623  •  0.9 % NaCl with KCl 20 mEq infusion, , Intravenous, Continuous, Marya Zepeda A.P.R.N., Last Rate: 100 mL/hr at 12/31/18 1305  •  estradiol (ESTRACE) CREA 0.5 g, 0.5 g, Vaginal, Q72HRS, Rod Valencia M.D.  •  polyethyl glycol-propyl glycol (SYSTANE) 0.4-0.3 % ophth drops 1-2 Drop, 1-2 Drop, Both Eyes, TID PRN, Rod Valencia M.D., 2 Drop at 12/29/18 1321  •  HYDROmorphone (DILAUDID) 0.2 mg/mL in 50 mL NS (PCA), , Intravenous, Continuous **AND** [DISCONTINUED] Pharmacy Consult Request ...Pain Management Review 1 Each, 1 Each, Other, PRN, Norma Lizama A.P.R.NParth  •  albuterol inhaler 2 Puff, 2 Puff, Inhalation, Q6HRS PRN, Eduard Lopez M.D.  •  aspirin (ASA) chewable tab 81 mg, 81 mg, Oral, DAILY, Eduard Lopez M.D., Stopped at 12/29/18 0600  •  cetirizine (ZYRTEC) tablet 10 mg, 10 mg, Oral, QDAY PRN, Eduard Lopez M.D.  •  oxyCODONE immediate-release (ROXICODONE) tablet 30 mg, 30 mg, Oral, Q4HRS PRN, Eduard Lopez M.D.  •  senna-docusate (PERICOLACE or SENOKOT S) 8.6-50 MG per tablet 2 Tab, 2 Tab, Oral, BID, Stopped at 12/29/18 0600 **AND** polyethylene glycol/lytes (MIRALAX) PACKET 1 Packet, 1 Packet, Oral, QDAY PRN **AND** magnesium hydroxide (MILK OF MAGNESIA) suspension 30 mL, 30 mL, Oral, QDAY PRN **AND** bisacodyl (DULCOLAX) suppository 10 mg, 10 mg, Rectal, QDAY PRN, Eduard Lopez M.D.  •  ondansetron (ZOFRAN) syringe/vial injection 4 mg, 4 mg, Intravenous,  Q4HRS PRN, Eduard Lopez M.D., 4 mg at 12/31/18 1002  •  ondansetron (ZOFRAN ODT) dispertab 4 mg, 4 mg, Oral, Q4HRS PRN, Eduard Lopez M.D.  •  acetaminophen (TYLENOL) tablet 650 mg, 650 mg, Oral, Q6HRS PRN, Eduard Lopez M.D., 650 mg at 12/31/18 0109  •  Notify provider if pain remains uncontrolled, , , CONTINUOUS **AND** Use the numeric rating scale (NRS-11) on regular floors and Critical-Care Pain Observation Tool (CPOT) on ICUs/Trauma to assess pain, , , CONTINUOUS **AND** Pulse Ox (Oximetry), , , CONTINUOUS **AND** Pharmacy Consult Request ...Pain Management Review, , Other, PRN **AND** If patient difficult to arouse and/or has respiratory depression, stop any opiates that are currently infusing and call a Rapid Response., , , CONTINUOUS **AND** [DISCONTINUED] oxyCODONE immediate-release (ROXICODONE) tablet 2.5 mg, 2.5 mg, Oral, Q3HRS PRN **AND** [DISCONTINUED] oxyCODONE immediate-release (ROXICODONE) tablet 5 mg, 5 mg, Oral, Q3HRS PRN **AND** HYDROmorphone pf (DILAUDID) injection 0.25 mg, 0.25 mg, Intravenous, Q3HRS PRN, Eduard Lopez M.D., 0.25 mg at 12/29/18 1302        Rachel Singletary M.D.

## 2018-12-31 NOTE — DISCHARGE PLANNING
LSW received call to [facility] regarding Pt was previously on service with [facility] and Pt will not be accepted back at this time.

## 2018-12-31 NOTE — CARE PLAN
Problem: Bowel/Gastric:  Goal: Normal bowel function is maintained or improved  Outcome: PROGRESSING AS EXPECTED  Encouraged ambulation to promote bowel motility. Small output present in ostomy, watery.     Problem: Pain Management  Goal: Pain level will decrease to patient's comfort goal  Outcome: PROGRESSING AS EXPECTED  PCA in use with positive effect. Pt appears to be resting comfortably for the majority of the day.

## 2018-12-31 NOTE — PROGRESS NOTES
2RN skin check:  Midline incision with staples and penrose drain to lower portion. Dressing changed.  Ileostomy to left quadrant, appliance intact.  All other skin intact. No areas of skin concern.

## 2018-12-31 NOTE — PROGRESS NOTES
Assumed care of patient from day shift RN. Report given.   Reporting pain of 8/10. PCA in use.   VSS /74   Pulse 85   Temp 36.3 °C (97.4 °F) (Temporal)   Resp 17   Ht 1.829 m (6')   Wt 76.6 kg (168 lb 14 oz)   SpO2 96%   Breastfeeding? No   BMI 22.90 kg/m²   A & O x 4, anxious   Pt states that she has numbness and tingling in her BLE and bilateral hands but that is baseline  Room air with oxygen saturation of 96%  Nectar thick diet, tolerating well.   Midline abdominal incision dressing CDI, penrose drain in abdomen, RLQ ileostomy.   +void   Pt ambulated unit, standby assist.   POC discussed for the evening. Safety education provided. Bed is locked and in lowest position. Call light and belongings are within reach.

## 2018-12-31 NOTE — PROGRESS NOTES
Received report from evening RN.  Assumed care of patient.  Patient A&Ox4.  On dilaudid PCA with complaints of pain 7/10 in abdomen.  Illeostomy in place with yellow, watery output.  Midline abdominal incision MAXX, with staples, some redness noted.  Penrose drain removed and dressing removed per PA.  Zofran given per MAR.  Ambulating with steady gait and SBA.  BLE edema, dependant.  Ultrasound ordered.  Up to chair this AM.  Tolerating clear liquid diet, without vomiting.  Advanced to full liquid diet.  On room air.  Plan of care discussed.  Call light within reach.  Bed in lowest position.

## 2018-12-31 NOTE — CARE PLAN
Problem: Safety  Goal: Will remain free from injury  Outcome: PROGRESSING AS EXPECTED  Safety education provided. Bed is locked an din lowest position. Call light and belongings are within reach. Patient uses call light appropriately.     Problem: Pain Management  Goal: Pain level will decrease to patient's comfort goal  Outcome: PROGRESSING AS EXPECTED  Monitor pain levels. PCA in use. Provide distraction techniques. Reposition patient.

## 2018-12-31 NOTE — PROGRESS NOTES
Surgical Progress Note    Author: Lilly Johnson Date & Time created: 2018   8:36 AM     Interval Events:  POD#2, exploratory laparotomy, adhesiolysis, takedown of ileostomy, repair of parastomal hernia with placement of Gentrix xenograft hernia matrix, recreation of new ileostomy, ileoscopy using flexible sigmoidoscope.    Pt sitting up in chair this morning. She is tolerating clears, nectar thickened, without nausea/vomiting. Admits to incisional abdominal pain, controlled with PCA. She is requesting to keep the PCA 1 more day. +stool and gas in ostomy appliance. She reports swelling of her RLE>LLE. Previous hx of DVT. Lovenox started yesterday.    Review of Systems   Constitutional: Negative for chills and fever.   Respiratory: Negative for cough and shortness of breath.    Cardiovascular: Negative for chest pain and palpitations.   Gastrointestinal: Positive for abdominal pain (incisional). Negative for constipation, diarrhea, heartburn, nausea and vomiting.        +stool and gas in ostomy appliance   Genitourinary: Negative for dysuria.   Musculoskeletal:        C/o RLE swelling > left     Hemodynamics:  Temp (24hrs), Av.2 °C (97.2 °F), Min:35.9 °C (96.6 °F), Max:36.3 °C (97.4 °F)  Temperature: 36.3 °C (97.4 °F)  Pulse  Av.9  Min: 52  Max: 126  Blood Pressure : 120/64     Respiratory:    Respiration: 18, Pulse Oximetry: 95 %        RUL Breath Sounds: Clear, RML Breath Sounds: Clear, RLL Breath Sounds: Diminished, JACQUI Breath Sounds: Clear, LLL Breath Sounds: Diminished  Neuro:  GCS       Fluids:    Intake/Output Summary (Last 24 hours) at 18 0836  Last data filed at 18 0741   Gross per 24 hour   Intake           2485.2 ml   Output             1350 ml   Net           1135.2 ml        Current Diet Order   Procedures   • Diet Order Full Liquid     Physical Exam   Constitutional: She is oriented to person, place, and time. She appears well-developed and well-nourished. No distress.   HENT:    Head: Normocephalic and atraumatic.   Eyes: Conjunctivae are normal.   Neck: Normal range of motion. Neck supple.   Cardiovascular: Normal rate and regular rhythm.    Pulmonary/Chest: Effort normal. No respiratory distress.   Abdominal: Soft. She exhibits distension (mild). She exhibits no mass. There is tenderness (incisional). There is no rebound and no guarding.   Ostomy pink with stool and gas output   Musculoskeletal: Normal range of motion. She exhibits no edema or tenderness.   Neurological: She is alert and oriented to person, place, and time.   Skin: Skin is warm and dry. No rash noted. No erythema. No pallor.   Incision with staples, penrose drain, and dressing with minimal serosanguinous ooze.   Psychiatric: She has a normal mood and affect.     Labs:  Recent Results (from the past 24 hour(s))   CBC WITH DIFFERENTIAL    Collection Time: 12/31/18  4:07 AM   Result Value Ref Range    WBC 9.0 4.8 - 10.8 K/uL    RBC 3.72 (L) 4.20 - 5.40 M/uL    Hemoglobin 11.5 (L) 12.0 - 16.0 g/dL    Hematocrit 36.0 (L) 37.0 - 47.0 %    MCV 96.8 81.4 - 97.8 fL    MCH 30.9 27.0 - 33.0 pg    MCHC 31.9 (L) 33.6 - 35.0 g/dL    RDW 48.1 35.9 - 50.0 fL    Platelet Count 196 164 - 446 K/uL    MPV 10.5 9.0 - 12.9 fL    Neutrophils-Polys 65.40 44.00 - 72.00 %    Lymphocytes 23.40 22.00 - 41.00 %    Monocytes 9.70 0.00 - 13.40 %    Eosinophils 0.80 0.00 - 6.90 %    Basophils 0.40 0.00 - 1.80 %    Immature Granulocytes 0.30 0.00 - 0.90 %    Nucleated RBC 0.00 /100 WBC    Neutrophils (Absolute) 5.87 2.00 - 7.15 K/uL    Lymphs (Absolute) 2.10 1.00 - 4.80 K/uL    Monos (Absolute) 0.87 (H) 0.00 - 0.85 K/uL    Eos (Absolute) 0.07 0.00 - 0.51 K/uL    Baso (Absolute) 0.04 0.00 - 0.12 K/uL    Immature Granulocytes (abs) 0.03 0.00 - 0.11 K/uL    NRBC (Absolute) 0.00 K/uL   BASIC METABOLIC PANEL    Collection Time: 12/31/18  4:07 AM   Result Value Ref Range    Sodium 139 135 - 145 mmol/L    Potassium 4.3 3.6 - 5.5 mmol/L    Chloride 110 96 -  112 mmol/L    Co2 19 (L) 20 - 33 mmol/L    Glucose 106 (H) 65 - 99 mg/dL    Bun 12 8 - 22 mg/dL    Creatinine 0.80 0.50 - 1.40 mg/dL    Calcium 9.4 8.5 - 10.5 mg/dL    Anion Gap 10.0 0.0 - 11.9   ESTIMATED GFR    Collection Time: 12/31/18  4:07 AM   Result Value Ref Range    GFR If African American >60 >60 mL/min/1.73 m 2    GFR If Non African American >60 >60 mL/min/1.73 m 2   HISTOLOGY REQUEST    Collection Time: 12/31/18  8:14 AM   Result Value Ref Range    Pathology Request Sent to [facility]      Medical Decision Making, by Problem:  Active Hospital Problems    Diagnosis   • 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 Breathing unlabored. Tolerating clears.  Abdomen is soft, mildly distended, tender at incision site. Incision with staples ok, dressing and penrose drain removed, can remain open to room air. VS stable. Labs reviewed. Encouraged ambulation and incentive spirometry.  Wean O2. Good ostomy function. Advance to full liquids. PCA for 1 more day. No edema appreciated during exam, however pt reports swelling of RLE > LLE. Venous duplex U/S of bilateral LE today. Lovenox started yesterday, continue. Discussed with Dr. Cruz.      Quality Measures:  Quality-Core Measures    Reviewed items::  Labs reviewed  Sage catheter::  No Sage  DVT prophylaxis pharmacological::  Enoxaparin (Lovenox)  DVT prophylaxis - mechanical:  SCDs      Discussed patient condition with RN, Patient and Dr. Cruz

## 2018-12-31 NOTE — THERAPY
Speech Therapy Contact Note:  Attempted to see pt for clinical swallow evaluation. Per RN, pt has not been cleared to advance past NTFL at this time so will hold swallow evaluation until pt is cleared to try further PO trials.

## 2018-12-31 NOTE — ASSESSMENT & PLAN NOTE
Patient does have a history of DVT.  Ultrasound today showing newly developed a DVT, likely provoked by recent surgery  -Was on heparin gtts secondary to dysphagia  -D/C heparin and start xarelto after esophageal dilation.   -likely total time period for AC will be 3-6 months

## 2019-01-01 VITALS
HEART RATE: 145 BPM | SYSTOLIC BLOOD PRESSURE: 138 MMHG | OXYGEN SATURATION: 96 % | RESPIRATION RATE: 20 BRPM | DIASTOLIC BLOOD PRESSURE: 80 MMHG | TEMPERATURE: 98.1 F

## 2019-01-01 LAB
ANION GAP SERPL CALC-SCNC: 7 MMOL/L (ref 0–11.9)
APTT PPP: 101.3 SEC (ref 24.7–36)
APTT PPP: 127.9 SEC (ref 24.7–36)
APTT PPP: 74.6 SEC (ref 24.7–36)
APTT PPP: 88.8 SEC (ref 24.7–36)
BASOPHILS # BLD AUTO: 0.5 % (ref 0–1.8)
BASOPHILS # BLD: 0.04 K/UL (ref 0–0.12)
BUN SERPL-MCNC: 7 MG/DL (ref 8–22)
CALCIUM SERPL-MCNC: 9.3 MG/DL (ref 8.5–10.5)
CHLORIDE SERPL-SCNC: 108 MMOL/L (ref 96–112)
CO2 SERPL-SCNC: 24 MMOL/L (ref 20–33)
CREAT SERPL-MCNC: 0.68 MG/DL (ref 0.5–1.4)
EOSINOPHIL # BLD AUTO: 0.12 K/UL (ref 0–0.51)
EOSINOPHIL NFR BLD: 1.5 % (ref 0–6.9)
ERYTHROCYTE [DISTWIDTH] IN BLOOD BY AUTOMATED COUNT: 48.4 FL (ref 35.9–50)
GLUCOSE SERPL-MCNC: 88 MG/DL (ref 65–99)
HCT VFR BLD AUTO: 34.8 % (ref 37–47)
HGB BLD-MCNC: 10.8 G/DL (ref 12–16)
IMM GRANULOCYTES # BLD AUTO: 0.05 K/UL (ref 0–0.11)
IMM GRANULOCYTES NFR BLD AUTO: 0.6 % (ref 0–0.9)
LYMPHOCYTES # BLD AUTO: 1.83 K/UL (ref 1–4.8)
LYMPHOCYTES NFR BLD: 23.1 % (ref 22–41)
MCH RBC QN AUTO: 30.2 PG (ref 27–33)
MCHC RBC AUTO-ENTMCNC: 31 G/DL (ref 33.6–35)
MCV RBC AUTO: 97.2 FL (ref 81.4–97.8)
MONOCYTES # BLD AUTO: 0.57 K/UL (ref 0–0.85)
MONOCYTES NFR BLD AUTO: 7.2 % (ref 0–13.4)
NEUTROPHILS # BLD AUTO: 5.32 K/UL (ref 2–7.15)
NEUTROPHILS NFR BLD: 67.1 % (ref 44–72)
NRBC # BLD AUTO: 0 K/UL
NRBC BLD-RTO: 0 /100 WBC
PLATELET # BLD AUTO: 211 K/UL (ref 164–446)
PMV BLD AUTO: 10.6 FL (ref 9–12.9)
POTASSIUM SERPL-SCNC: 4 MMOL/L (ref 3.6–5.5)
RBC # BLD AUTO: 3.58 M/UL (ref 4.2–5.4)
SODIUM SERPL-SCNC: 139 MMOL/L (ref 135–145)
WBC # BLD AUTO: 7.9 K/UL (ref 4.8–10.8)

## 2019-01-01 PROCEDURE — A9270 NON-COVERED ITEM OR SERVICE: HCPCS | Performed by: INTERNAL MEDICINE

## 2019-01-01 PROCEDURE — 700111 HCHG RX REV CODE 636 W/ 250 OVERRIDE (IP): Performed by: PHYSICIAN ASSISTANT

## 2019-01-01 PROCEDURE — 700101 HCHG RX REV CODE 250: Performed by: NURSE PRACTITIONER

## 2019-01-01 PROCEDURE — 770006 HCHG ROOM/CARE - MED/SURG/GYN SEMI*

## 2019-01-01 PROCEDURE — 93970 EXTREMITY STUDY: CPT | Mod: 26 | Performed by: SURGERY

## 2019-01-01 PROCEDURE — 665999 HH PPS REVENUE DEBIT

## 2019-01-01 PROCEDURE — 80048 BASIC METABOLIC PNL TOTAL CA: CPT

## 2019-01-01 PROCEDURE — 665998 HH PPS REVENUE CREDIT

## 2019-01-01 PROCEDURE — 700111 HCHG RX REV CODE 636 W/ 250 OVERRIDE (IP): Performed by: INTERNAL MEDICINE

## 2019-01-01 PROCEDURE — 700111 HCHG RX REV CODE 636 W/ 250 OVERRIDE (IP): Performed by: HOSPITALIST

## 2019-01-01 PROCEDURE — 99233 SBSQ HOSP IP/OBS HIGH 50: CPT | Performed by: INTERNAL MEDICINE

## 2019-01-01 PROCEDURE — 36415 COLL VENOUS BLD VENIPUNCTURE: CPT

## 2019-01-01 PROCEDURE — 700102 HCHG RX REV CODE 250 W/ 637 OVERRIDE(OP): Performed by: INTERNAL MEDICINE

## 2019-01-01 PROCEDURE — 85025 COMPLETE CBC W/AUTO DIFF WBC: CPT

## 2019-01-01 PROCEDURE — 85730 THROMBOPLASTIN TIME PARTIAL: CPT | Mod: 91

## 2019-01-01 RX ADMIN — ONDANSETRON HYDROCHLORIDE 4 MG: 2 INJECTION, SOLUTION INTRAMUSCULAR; INTRAVENOUS at 10:15

## 2019-01-01 RX ADMIN — HEPARIN SODIUM 950 UNITS/HR: 5000 INJECTION, SOLUTION INTRAVENOUS; SUBCUTANEOUS at 08:48

## 2019-01-01 RX ADMIN — POTASSIUM CHLORIDE AND SODIUM CHLORIDE: 900; 150 INJECTION, SOLUTION INTRAVENOUS at 10:15

## 2019-01-01 RX ADMIN — METOPROLOL TARTRATE 25 MG: 25 TABLET, FILM COATED ORAL at 05:04

## 2019-01-01 RX ADMIN — HEPARIN SODIUM 950 UNITS/HR: 5000 INJECTION, SOLUTION INTRAVENOUS; SUBCUTANEOUS at 17:07

## 2019-01-01 RX ADMIN — ONDANSETRON HYDROCHLORIDE 4 MG: 2 INJECTION, SOLUTION INTRAMUSCULAR; INTRAVENOUS at 15:49

## 2019-01-01 RX ADMIN — METOPROLOL TARTRATE 25 MG: 25 TABLET, FILM COATED ORAL at 17:12

## 2019-01-01 RX ADMIN — ONDANSETRON HYDROCHLORIDE 4 MG: 2 INJECTION, SOLUTION INTRAMUSCULAR; INTRAVENOUS at 05:11

## 2019-01-01 RX ADMIN — Medication: at 11:04

## 2019-01-01 SDOH — ECONOMIC STABILITY: HOUSING INSECURITY: UNSAFE COOKING RANGE AREA: 0

## 2019-01-01 SDOH — ECONOMIC STABILITY: HOUSING INSECURITY: UNSAFE APPLIANCES: 0

## 2019-01-01 ASSESSMENT — ENCOUNTER SYMPTOMS
NERVOUS/ANXIOUS: 0
COUGH: 0
DIZZINESS: 0
DIFFICULTY THINKING: 1
PALPITATIONS: 0
DIARRHEA: 0
FEVER: 0
HEARTBURN: 0
WEAKNESS: 1
VOMITING: 0
ABDOMINAL PAIN: 1
CHILLS: 0
EYE PAIN: 0
DEPRESSION: 0
MENTAL STATUS CHANGE: 0
SHORTNESS OF BREATH: 0
NAUSEA: 1
CONSTIPATION: 0
NAUSEA: 0
POOR JUDGMENT: 1

## 2019-01-01 ASSESSMENT — PAIN SCALES - GENERAL
PAINLEVEL_OUTOF10: 6
PAINLEVEL_OUTOF10: 7
PAINLEVEL_OUTOF10: 6
PAINLEVEL_OUTOF10: 7
PAINLEVEL_OUTOF10: 6
PAINLEVEL_OUTOF10: 6

## 2019-01-01 NOTE — PROGRESS NOTES
RN updated Dr. Singletary on patient's ultrasound showing DVT.  Per Dr. Singletary, call surgery to see if patient can be started on heparin gtt.  RN spoke with CORWIN Rm.  OK to start on heparin gtt without bolus dose.  RN spoke with Dr. Singletary about patient's difficult IV access.  Patient has a single lumen midline IV running NS w/ 20K and dilaudid PCA.  Need a new line for heparin gtt.  Per Dr. Singletary, try for ultrasound guided IV access.  OK to place an EJ if necessary.  Patient refusing EJ placement, as she says it impairs her swallowing and causes inflammation in that area.  Attempting ultrasound guided IV access at this time.

## 2019-01-01 NOTE — CARE PLAN
Problem: Venous Thromboembolism (VTW)/Deep Vein Thrombosis (DVT) Prevention:  Goal: Patient will participate in Venous Thrombosis (VTE)/Deep Vein Thrombosis (DVT)Prevention Measures  Outcome: PROGRESSING AS EXPECTED  Patient on heparin gtt for DVT.      Problem: Pain Management  Goal: Pain level will decrease to patient's comfort goal  Outcome: PROGRESSING AS EXPECTED  Per order, changed settings on dilaudid PCA to wean.

## 2019-01-01 NOTE — WOUND TEAM
"[facility]  Inpatient Services  New Ostomy Management & Teaching    HPI:  Reviewed  PMH: Reviewed   SH: Reviewed    Subjective: \"I remember you from years ago\"    Objective:  Appliance intact and stoma red per pouch     Ostomy type:  ileostomy   Stoma location:  LLQ   Stoma assessment:    Appearance:  Red, moist    Size:  ~ 1 1/2\"    Protrusion:  Well budded    Output:  Small loose brown    MC jxn  Appliance intact    Peristomal skin:  See above     Ostomy Appliance (type and size):  2 3/4\" two piece; declines paste ring    Interventions:  Met with patient who is knowledgeable having had an ileostomy RLQ for many years.  Her previous stoma was flush and so she is knowledgeable regarding pouching techniques.  Her appliance is currently intact and stoma red.  Supplies at bedside.  Catalog marked for current supplies being used at patient's request and she is already hooked up with [employer] and aware of how to order supplies.     Pt education: Questions and concerns addressed; see above    Evaluation: revised ileostomy which patient is comfortable with care and declines further education or assistance by ostomy team at this time    Plan: Ostomy nurses will see if contacted by patient/staff    Anticipated discharge needs:  none  "

## 2019-01-01 NOTE — PROGRESS NOTES
Surgical Progress Note    Author: Lilly Johnson Date & Time created: 2019   10:07 AM     Interval Events:  POD#3, exploratory laparotomy, adhesiolysis, takedown of ileostomy, repair of parastomal hernia with placement of Gentrix xenograft hernia matrix, recreation of new ileostomy, ileoscopy using flexible sigmoidoscope.    Pt sitting up in chair this morning. She is tolerating fulls, nectar thickened, without vomiting. She reports continuous nausea from prior to surgery with her extensive history.  Admits to incisional abdominal pain, controlled with PCA, parameters adjusted down. Pt essentially refusing transition to oral pain meds because the hospital does not carry the dye-free Oxycodone oral solution for which she takes through her outpatient pharmacy.  +stool and gas in ostomy appliance. She reports swelling of BLE. U/S positive for acute DVT of RLE. Heparin drip started yesterday.    Review of Systems   Constitutional: Negative for chills and fever.   Respiratory: Negative for cough and shortness of breath.    Cardiovascular: Negative for chest pain and palpitations.   Gastrointestinal: Positive for abdominal pain (incisional) and nausea. Negative for constipation, diarrhea, heartburn and vomiting.        +stool and gas in ostomy appliance   Genitourinary: Negative for dysuria.   Musculoskeletal:        C/o swelling in BLE, RLE swelling > left     Hemodynamics:  Temp (24hrs), Av.7 °C (98 °F), Min:36.3 °C (97.4 °F), Max:37.1 °C (98.7 °F)  Temperature: 36.3 °C (97.4 °F)  Pulse  Av  Min: 52  Max: 126  Blood Pressure : 115/53     Respiratory:    Respiration: 18, Pulse Oximetry: 98 %     Work Of Breathing / Effort: Shallow  RUL Breath Sounds: Clear, RML Breath Sounds: Clear, RLL Breath Sounds: Diminished, JACQUI Breath Sounds: Clear, LLL Breath Sounds: Diminished  Neuro:  GCS       Fluids:    Intake/Output Summary (Last 24 hours) at 19 1007  Last data filed at 19 0800   Gross per 24 hour    Intake           3074.7 ml   Output             2025 ml   Net           1049.7 ml        Current Diet Order   Procedures   • Diet Order Low Fiber(GI Soft)     Physical Exam   Constitutional: She is oriented to person, place, and time. She appears well-developed and well-nourished. No distress.   HENT:   Head: Normocephalic and atraumatic.   Eyes: Conjunctivae are normal.   Neck: Normal range of motion. Neck supple.   Cardiovascular: Normal rate and regular rhythm.    Pulmonary/Chest: Effort normal. No respiratory distress.   Abdominal: Soft. She exhibits distension (mild). She exhibits no mass. There is tenderness (incisional). There is no rebound and no guarding.   Ostomy pink with stool and gas output   Musculoskeletal: Normal range of motion. She exhibits no edema or tenderness.   Neurological: She is alert and oriented to person, place, and time.   Skin: Skin is warm and dry. No rash noted. No erythema. No pallor.   Incision with staples, minimal serosanguinous ooze.   Psychiatric: She has a normal mood and affect.     Labs:  Recent Results (from the past 24 hour(s))   APTT    Collection Time: 12/31/18  4:30 PM   Result Value Ref Range    APTT 31.2 24.7 - 36.0 sec   Prothrombin Time    Collection Time: 12/31/18  4:30 PM   Result Value Ref Range    PT 15.7 (H) 12.0 - 14.6 sec    INR 1.24 (H) 0.87 - 1.13   APTT    Collection Time: 01/01/19 12:24 AM   Result Value Ref Range    APTT 101.3 (HH) 24.7 - 36.0 sec   CBC WITH DIFFERENTIAL    Collection Time: 01/01/19 12:24 AM   Result Value Ref Range    WBC 7.9 4.8 - 10.8 K/uL    RBC 3.58 (L) 4.20 - 5.40 M/uL    Hemoglobin 10.8 (L) 12.0 - 16.0 g/dL    Hematocrit 34.8 (L) 37.0 - 47.0 %    MCV 97.2 81.4 - 97.8 fL    MCH 30.2 27.0 - 33.0 pg    MCHC 31.0 (L) 33.6 - 35.0 g/dL    RDW 48.4 35.9 - 50.0 fL    Platelet Count 211 164 - 446 K/uL    MPV 10.6 9.0 - 12.9 fL    Neutrophils-Polys 67.10 44.00 - 72.00 %    Lymphocytes 23.10 22.00 - 41.00 %    Monocytes 7.20 0.00 - 13.40 %     Eosinophils 1.50 0.00 - 6.90 %    Basophils 0.50 0.00 - 1.80 %    Immature Granulocytes 0.60 0.00 - 0.90 %    Nucleated RBC 0.00 /100 WBC    Neutrophils (Absolute) 5.32 2.00 - 7.15 K/uL    Lymphs (Absolute) 1.83 1.00 - 4.80 K/uL    Monos (Absolute) 0.57 0.00 - 0.85 K/uL    Eos (Absolute) 0.12 0.00 - 0.51 K/uL    Baso (Absolute) 0.04 0.00 - 0.12 K/uL    Immature Granulocytes (abs) 0.05 0.00 - 0.11 K/uL    NRBC (Absolute) 0.00 K/uL   BASIC METABOLIC PANEL    Collection Time: 01/01/19 12:24 AM   Result Value Ref Range    Sodium 139 135 - 145 mmol/L    Potassium 4.0 3.6 - 5.5 mmol/L    Chloride 108 96 - 112 mmol/L    Co2 24 20 - 33 mmol/L    Glucose 88 65 - 99 mg/dL    Bun 7 (L) 8 - 22 mg/dL    Creatinine 0.68 0.50 - 1.40 mg/dL    Calcium 9.3 8.5 - 10.5 mg/dL    Anion Gap 7.0 0.0 - 11.9   ESTIMATED GFR    Collection Time: 01/01/19 12:24 AM   Result Value Ref Range    GFR If African American >60 >60 mL/min/1.73 m 2    GFR If Non African American >60 >60 mL/min/1.73 m 2   APTT    Collection Time: 01/01/19  6:51 AM   Result Value Ref Range    APTT 127.9 (HH) 24.7 - 36.0 sec     Medical Decision Making, by Problem:  Active Hospital Problems    Diagnosis   • 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) [F11.20]     Plan:  Pt is alert and oriented, NAD. Breathing unlabored. Tolerating clears.  Abdomen is soft, mildly distended, tender at incision site. Incision with staples ok. VS stable. Labs reviewed. Encouraged ambulation and incentive spirometry.  Wean O2. Good ostomy function. Advance to GI soft. PCA parameters adjusted down.  U/S of BLE yesterday positive for acute DVT in RLE. Heparin drip started yesterday. Discussed with Dr. Cruz.      Quality Measures:  Quality-Core Measures   Reviewed items::  Labs reviewed  Sage catheter::  No Sage  DVT prophylaxis pharmacological::  Enoxaparin (Lovenox)  DVT prophylaxis - mechanical:  SCDs      Discussed patient condition with RN, Patient and Dr. Cruz

## 2019-01-01 NOTE — PROGRESS NOTES
Received report from evening RN.  Assumed care of patient.  Patient A&Ox4.  On dilaudid PCA with complaints of pain 7/10 in abdomen.  Illeostomy in place with dark green, watery output.  Midline abdominal incision MAXX, with staples, some redness noted. Scant serous drainage from midline.   Zofran given per MAR.  Ambulating with steady gait and SBA.  BLE edema, Up to chair this AM. Advanced to GI soft diet.  On room air.  Plan of care discussed.  Call light within reach.  Bed in lowest position.

## 2019-01-01 NOTE — WOUND TEAM
"[facility]  Inpatient Services  New Ostomy Management & Teaching    HPI:  Reviewed  PMH: Reviewed   SH: Reviewed    Subjective: \"My other stoma was retracted.  This one is beautiful\"    Objective:  Appliance intact and patient requesting assistance with appliance change     Ostomy type:  ileostomy   Stoma location:  LLQ   Stoma assessment:    Appearance:  Red, edematous    Size:  1 1/2\"    Protrusion:  Well budded    Output: small loose brown    MC jxn  intact    Peristomal skin:  intact     Ostomy Appliance (type and size):  2 3/4\" two piece with paste ring    Interventions:  Met with patient and removed old appliance, cleansed peristomal skin and cut barrier to fit.  Applied paste ring to barrier opening and applied to peristomal skin.  Snapped on pouch closing end of pouch.  Discussed again with patient supplies and she wants to try [employer] products.  Catalog already marked.     Pt education: Questions and concerns addressed; see above    Evaluation:  No pouching issues with this well budded stoma; patient is independent with care    Plan: no follow up planned     Anticipated discharge needs:  Has everything for discharge   "

## 2019-01-01 NOTE — PROGRESS NOTES
[facility] Medicine Daily Progress Note    Date of Service  1/1/2019    Chief Complaint  Abdominal pain    Hospital Course    63 y.o. female with PMH Crohn's with previous colectomy with ileostomy admitted 12/28/2018 with generalized abdominal pain, nausea decreased output from her ileostomy.  Recently hospitalized at [facility] and evaluated by Dr. Nunez.  Patient was evaluated by surgery.  Patient underwent surgery ostomy revision and takedown of a new ostomy creation 12/29.  Patient tolerated procedure very well.      Interval Problem Update  AB surgery-pain quite well controlled, she does not really like using the dilaudid PCA, good ostomy output, no nausea or vomiting this AM.    DVT-no increased swelling or pain noted in the legs this AM. Patient is tolerating weight based heparin gtt quite well with no issues with bleeding.      Consultants/Specialty  -General surgery    Code Status  Full    Disposition  TBD    Review of Systems  Review of Systems   Constitutional: Negative for malaise/fatigue.   HENT: Negative for hearing loss.    Eyes: Negative for pain.   Respiratory: Negative for shortness of breath.    Cardiovascular: Positive for leg swelling. Negative for palpitations.   Gastrointestinal: Positive for abdominal pain (better controlled today). Negative for constipation, diarrhea, nausea and vomiting.   Genitourinary: Negative for dysuria.   Skin: Negative for rash.   Neurological: Positive for weakness (slowly improving). Negative for dizziness.   Psychiatric/Behavioral: Negative for depression. The patient is not nervous/anxious.    All other systems reviewed and are negative.       Physical Exam  Temp:  [36.3 °C (97.4 °F)-37.1 °C (98.7 °F)] 36.3 °C (97.4 °F)  Pulse:  [] 82  Resp:  [18] 18  BP: (114-151)/(49-71) 115/53    Physical Exam   Constitutional: She is oriented to person, place, and time. Vital signs are normal. She appears well-developed. She is cooperative. She has a sickly  appearance.   HENT:   Head: Normocephalic.   Right Ear: Hearing normal.   Left Ear: Hearing normal.   Nose: Nose normal.   Mouth/Throat: Oropharynx is clear and moist and mucous membranes are normal. No oropharyngeal exudate.   Eyes: Pupils are equal, round, and reactive to light. Conjunctivae and EOM are normal.   Neck: Normal range of motion and phonation normal. Neck supple.   Cardiovascular: Regular rhythm, normal heart sounds and intact distal pulses.  Tachycardia present.    No murmur heard.  No edema  Cap refill WNL   Pulmonary/Chest: Effort normal and breath sounds normal. No stridor. No respiratory distress. She has no rhonchi. She has no rales.   Abdominal: Soft. Normal appearance. Bowel sounds are decreased. There is generalized tenderness.   Ileostomy with liquid, orange color output, no leakage noted  Stoma appears pink and healthy  Midline abdominal vertical incision with staples in place, appears C/D/I, minimal TTP   Musculoskeletal: Normal range of motion. She exhibits edema. She exhibits no tenderness.   Neurological: She is alert and oriented to person, place, and time. She has normal strength.   Skin: Skin is warm and dry. She is not diaphoretic. No pallor.   Psychiatric: Her mood appears not anxious. Her speech is not rapid and/or pressured.   Nursing note and vitals reviewed.      Fluids    Intake/Output Summary (Last 24 hours) at 01/01/19 1051  Last data filed at 01/01/19 0800   Gross per 24 hour   Intake           3074.7 ml   Output             2025 ml   Net           1049.7 ml       Laboratory  Recent Labs      12/30/18   0411  12/31/18   0407  01/01/19   0024   WBC  10.9*  9.0  7.9   RBC  3.86*  3.72*  3.58*   HEMOGLOBIN  12.0  11.5*  10.8*   HEMATOCRIT  35.8*  36.0*  34.8*   MCV  92.7  96.8  97.2   MCH  31.1  30.9  30.2   MCHC  33.5*  31.9*  31.0*   RDW  43.5  48.1  48.4   PLATELETCT  198  196  211   MPV  10.6  10.5  10.6     Recent Labs      12/30/18   0411  12/31/18   0407  01/01/19    0024   SODIUM  142  139  139   POTASSIUM  4.0  4.3  4.0   CHLORIDE  109  110  108   CO2  22  19*  24   GLUCOSE  106*  106*  88   BUN  12  12  7*   CREATININE  0.75  0.80  0.68   CALCIUM  8.9  9.4  9.3     Recent Labs      12/31/18   1630  01/01/19   0024  01/01/19   0651   APTT  31.2  101.3*  127.9*   INR  1.24*   --    --                Imaging  US-EXTREMITY VENOUS LOWER BILAT         IR-US GUIDED PIV   Final Result    Ultrasound-guided PERIPHERAL IV INSERTION performed by    qualified nursing staff as above.            IR-MIDLINE CATHETER INSERTION >5 YRS    (Results Pending)        Assessment/Plan  * Intractable cyclical vomiting with nausea- (present on admission)   Assessment & Plan    -nausea is greatly improved today, continue conservative management with IV anti-emetics PRN  -Tolerated surgery very well.  -IV fluid hydration with KCl       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HCC)- (present on admission)   Assessment & Plan    Likely to be the reason for nausea vomiting.  Status post surgery 12/30  Tolerated procedure very well.  Continue monitor by surgery.  -dilaudid PCA, adjusted down further today, will fully taper off tomorrow  -good output now, non bloody and increasing her appetite today, advance diet to GI soft     Crohn's disease of small intestine with complication (HCC)- (present on admission)   Assessment & Plan    -Followed as outpatient by Dr. Cruz  -Not currently in acute exacerbation       DVT (deep venous thrombosis) ([facility])   Assessment & Plan    Patient does have a history of DVT.  Ultrasound today showing newly developed a DVT, likely provoked by recent surgery  -continue weight based heparin gtt, tolerating well, goal PTT 60-90, check PTT Q6 hours, adjust PRN  -consider transitioning to DOAC soon, will need to discuss with surgery  -likely total time period for AC will be 3-6 months     History of infection with vancomycin resistant Enterococcus (VRE)- (present on admission)   Assessment & Plan    -remote  history of     History of MRSA infection- (present on admission)   Assessment & Plan    -Per chart review (+) MRSA nares 2014     Dysphasia- (present on admission)   Assessment & Plan    -History of  -Followed by GI as outpatient  -Aspiration precautions  -SLP eval  -Resume diet per surgery.     Hypokalemia- (present on admission)   Assessment & Plan    -resolved, replace PRN     DDD (degenerative disc disease), lumbar- (present on admission)   Assessment & Plan    -Chronic pain management     Paroxysmal atrial fibrillation (HCC)- (present on admission)   Assessment & Plan    -Not currently on chronic anticoagulation secondary to Crohn's  -Continue home metoprolol  -Baby ASA, hold while on heparin  -no current issues       Opioid type dependence, continuous (CMS-HCC)- (present on admission)   Assessment & Plan    -Continue home oxycodone     Chronic pain- (present on admission)   Assessment & Plan    -On chronic opioid management -Dr. Telles  -Bowel protocol            VTE prophylaxis: heparin gtt                Félix Palmer M.D.

## 2019-01-02 LAB
ANION GAP SERPL CALC-SCNC: 5 MMOL/L (ref 0–11.9)
APTT PPP: 45.6 SEC (ref 24.7–36)
APTT PPP: 86.1 SEC (ref 24.7–36)
APTT PPP: 98.8 SEC (ref 24.7–36)
BASOPHILS # BLD AUTO: 0.5 % (ref 0–1.8)
BASOPHILS # BLD: 0.03 K/UL (ref 0–0.12)
BUN SERPL-MCNC: 5 MG/DL (ref 8–22)
CALCIUM SERPL-MCNC: 9.2 MG/DL (ref 8.5–10.5)
CHLORIDE SERPL-SCNC: 111 MMOL/L (ref 96–112)
CO2 SERPL-SCNC: 24 MMOL/L (ref 20–33)
CREAT SERPL-MCNC: 0.59 MG/DL (ref 0.5–1.4)
EOSINOPHIL # BLD AUTO: 0.16 K/UL (ref 0–0.51)
EOSINOPHIL NFR BLD: 2.8 % (ref 0–6.9)
ERYTHROCYTE [DISTWIDTH] IN BLOOD BY AUTOMATED COUNT: 45.1 FL (ref 35.9–50)
GLUCOSE SERPL-MCNC: 98 MG/DL (ref 65–99)
HCT VFR BLD AUTO: 30.1 % (ref 37–47)
HGB BLD-MCNC: 9.8 G/DL (ref 12–16)
IMM GRANULOCYTES # BLD AUTO: 0.04 K/UL (ref 0–0.11)
IMM GRANULOCYTES NFR BLD AUTO: 0.7 % (ref 0–0.9)
LYMPHOCYTES # BLD AUTO: 1.65 K/UL (ref 1–4.8)
LYMPHOCYTES NFR BLD: 28.8 % (ref 22–41)
MAGNESIUM SERPL-MCNC: 1.6 MG/DL (ref 1.5–2.5)
MCH RBC QN AUTO: 30.2 PG (ref 27–33)
MCHC RBC AUTO-ENTMCNC: 32.6 G/DL (ref 33.6–35)
MCV RBC AUTO: 92.9 FL (ref 81.4–97.8)
MONOCYTES # BLD AUTO: 0.37 K/UL (ref 0–0.85)
MONOCYTES NFR BLD AUTO: 6.5 % (ref 0–13.4)
NEUTROPHILS # BLD AUTO: 3.47 K/UL (ref 2–7.15)
NEUTROPHILS NFR BLD: 60.7 % (ref 44–72)
NRBC # BLD AUTO: 0 K/UL
NRBC BLD-RTO: 0 /100 WBC
PLATELET # BLD AUTO: 164 K/UL (ref 164–446)
PMV BLD AUTO: 10.4 FL (ref 9–12.9)
POTASSIUM SERPL-SCNC: 3.5 MMOL/L (ref 3.6–5.5)
RBC # BLD AUTO: 3.24 M/UL (ref 4.2–5.4)
SODIUM SERPL-SCNC: 140 MMOL/L (ref 135–145)
WBC # BLD AUTO: 5.7 K/UL (ref 4.8–10.8)

## 2019-01-02 PROCEDURE — 700102 HCHG RX REV CODE 250 W/ 637 OVERRIDE(OP): Performed by: INTERNAL MEDICINE

## 2019-01-02 PROCEDURE — 700101 HCHG RX REV CODE 250: Performed by: INTERNAL MEDICINE

## 2019-01-02 PROCEDURE — 83735 ASSAY OF MAGNESIUM: CPT

## 2019-01-02 PROCEDURE — 36415 COLL VENOUS BLD VENIPUNCTURE: CPT

## 2019-01-02 PROCEDURE — 700111 HCHG RX REV CODE 636 W/ 250 OVERRIDE (IP): Performed by: PHYSICIAN ASSISTANT

## 2019-01-02 PROCEDURE — 85025 COMPLETE CBC W/AUTO DIFF WBC: CPT

## 2019-01-02 PROCEDURE — 665998 HH PPS REVENUE CREDIT

## 2019-01-02 PROCEDURE — 80048 BASIC METABOLIC PNL TOTAL CA: CPT

## 2019-01-02 PROCEDURE — 700111 HCHG RX REV CODE 636 W/ 250 OVERRIDE (IP): Performed by: HOSPITALIST

## 2019-01-02 PROCEDURE — 770006 HCHG ROOM/CARE - MED/SURG/GYN SEMI*

## 2019-01-02 PROCEDURE — 700111 HCHG RX REV CODE 636 W/ 250 OVERRIDE (IP): Performed by: INTERNAL MEDICINE

## 2019-01-02 PROCEDURE — 99233 SBSQ HOSP IP/OBS HIGH 50: CPT | Performed by: INTERNAL MEDICINE

## 2019-01-02 PROCEDURE — 665999 HH PPS REVENUE DEBIT

## 2019-01-02 PROCEDURE — A9270 NON-COVERED ITEM OR SERVICE: HCPCS | Performed by: INTERNAL MEDICINE

## 2019-01-02 PROCEDURE — 85730 THROMBOPLASTIN TIME PARTIAL: CPT

## 2019-01-02 RX ORDER — HYDROMORPHONE HYDROCHLORIDE 1 MG/ML
1 INJECTION, SOLUTION INTRAMUSCULAR; INTRAVENOUS; SUBCUTANEOUS
Status: DISCONTINUED | OUTPATIENT
Start: 2019-01-02 | End: 2019-01-06

## 2019-01-02 RX ORDER — OXYCODONE HCL 20 MG/ML
30 CONCENTRATE, ORAL ORAL EVERY 4 HOURS PRN
Status: DISCONTINUED | OUTPATIENT
Start: 2019-01-02 | End: 2019-01-04

## 2019-01-02 RX ADMIN — ONDANSETRON HYDROCHLORIDE 4 MG: 2 INJECTION, SOLUTION INTRAMUSCULAR; INTRAVENOUS at 05:22

## 2019-01-02 RX ADMIN — ONDANSETRON HYDROCHLORIDE 4 MG: 2 INJECTION, SOLUTION INTRAMUSCULAR; INTRAVENOUS at 16:53

## 2019-01-02 RX ADMIN — OXYCODONE HYDROCHLORIDE 30 MG: 100 SOLUTION ORAL at 16:53

## 2019-01-02 RX ADMIN — ONDANSETRON HYDROCHLORIDE 4 MG: 2 INJECTION, SOLUTION INTRAMUSCULAR; INTRAVENOUS at 10:42

## 2019-01-02 RX ADMIN — HEPARIN SODIUM 950 UNITS/HR: 5000 INJECTION, SOLUTION INTRAVENOUS; SUBCUTANEOUS at 22:21

## 2019-01-02 RX ADMIN — POTASSIUM CHLORIDE AND SODIUM CHLORIDE: 900; 150 INJECTION, SOLUTION INTRAVENOUS at 05:21

## 2019-01-02 RX ADMIN — METOPROLOL TARTRATE 25 MG: 25 TABLET, FILM COATED ORAL at 05:21

## 2019-01-02 RX ADMIN — HYDROMORPHONE HYDROCHLORIDE 1 MG: 1 INJECTION, SOLUTION INTRAMUSCULAR; INTRAVENOUS; SUBCUTANEOUS at 17:48

## 2019-01-02 RX ADMIN — HEPARIN SODIUM 950 UNITS/HR: 5000 INJECTION, SOLUTION INTRAVENOUS; SUBCUTANEOUS at 12:56

## 2019-01-02 RX ADMIN — METOPROLOL TARTRATE 25 MG: 25 TABLET, FILM COATED ORAL at 17:08

## 2019-01-02 RX ADMIN — PROCHLORPERAZINE EDISYLATE 10 MG: 5 INJECTION INTRAMUSCULAR; INTRAVENOUS at 17:48

## 2019-01-02 RX ADMIN — HYDROMORPHONE HYDROCHLORIDE 1 MG: 1 INJECTION, SOLUTION INTRAMUSCULAR; INTRAVENOUS; SUBCUTANEOUS at 22:23

## 2019-01-02 RX ADMIN — HEPARIN SODIUM 3200 UNITS: 1000 INJECTION, SOLUTION INTRAVENOUS; SUBCUTANEOUS at 12:53

## 2019-01-02 RX ADMIN — Medication: at 12:58

## 2019-01-02 ASSESSMENT — PAIN SCALES - GENERAL
PAINLEVEL_OUTOF10: 8
PAINLEVEL_OUTOF10: 6
PAINLEVEL_OUTOF10: 8
PAINLEVEL_OUTOF10: 7
PAINLEVEL_OUTOF10: 9

## 2019-01-02 ASSESSMENT — ENCOUNTER SYMPTOMS
MUSCULOSKELETAL NEGATIVE: 1
NERVOUS/ANXIOUS: 1
FEVER: 0
ROS GI COMMENTS: + FLATUS
DIZZINESS: 0
CARDIOVASCULAR NEGATIVE: 1
DEPRESSION: 1
CONSTIPATION: 0
EYE PAIN: 0
ABDOMINAL PAIN: 1
VOMITING: 0
DIARRHEA: 0
CHILLS: 0
NEUROLOGICAL NEGATIVE: 1
PALPITATIONS: 0
NAUSEA: 0
WEAKNESS: 1
DEPRESSION: 0
SHORTNESS OF BREATH: 0
EYES NEGATIVE: 1
RESPIRATORY NEGATIVE: 1

## 2019-01-02 NOTE — THERAPY
SPEECH PATHOLOGY:  Spoke with RN regarding swallow evaluation as patient has now been cleared for solid foods, and has been advanced to dysphagia II/NTL.  Patient has already eaten lunch and given poor oral intake, will plan to try to see her for swallow evaluation tomorrow during a meal in order to get a better assessment.  RN concurred and reported that patient has been doing well on current diet.  Patient does have a history of dysphagia with recent SLP services at [facility] during admission in December and was eventually advanced to dysphagia II with thins.  SLP will see tomorrow for swallow evaluation.

## 2019-01-02 NOTE — CARE PLAN
Problem: Communication  Goal: The ability to communicate needs accurately and effectively will improve  Outcome: PROGRESSING AS EXPECTED  Pt updates on POC, all questions answered at this time     Problem: Safety  Goal: Will remain free from falls  Outcome: PROGRESSING AS EXPECTED  Call light is within reach, treaded socks on, bed in lowest position, personal belongings are within reach, all needs met at this time

## 2019-01-02 NOTE — PROGRESS NOTES
Report received from RN, assumed care at 0700  Pt is A0X4, and responds appropriately   Pt declines any SOB, chest pain, new onset of numbness/ tingiling  Pt rates pain at 8 /10, on a scale of 1-10, pt has a dilaudid PCA, settings verified, pt educated on use   Pt has heparin drip 800 units/hr , APTT 98.8 at 0424, next APTT at 1130  Pt is voiding adequatly and without hesitancy  Pt has + flatus, + bowel sounds, + BM on 1/2/2018 through  LLQ ileostomy  Pt has a LLQ ileostomy, appliance is intact, no leaks noted, + BM, + flatus   Pt has midline that is approximated with staples, incision noted to be leaking at umbilical area, dressing applied   Pt has left upper arm midline, dressing is clean,dry and intact, blood return noted  Pt noted to have 3+ pitting edema to bilateral lower extremities  Pt ambulates with a x1 assist   Pt is tolerating a dysphagia 2 nectar thick GI soft low fiber diet, pt intermittent  Nausea/vomiting      Plan of care discussed, all questions answered. Explained importance of calling before getting OOB and pt verbalizes understanding. Explained importance of oral care. Call light is within reach, treaded slipper socks on, bed in lowest/ locked position, hourly rounding in place, all needs met at this time

## 2019-01-02 NOTE — PROGRESS NOTES
Bedside report completed.  Patient sitting up in chair, in no acute distress.  Assumed care of patient.  A&O x4.  /87   Pulse (!) 101   Temp 36.5 °C (97.7 °F) (Temporal)   Resp 18   Ht 1.829 m (6')   Wt 76.6 kg (168 lb 14 oz)   SpO2 96%   Breastfeeding? No   BMI 22.90 kg/m²   PCA in use and verified per MAR.  Heparin gtt, infusing at 950 units/hr.    + output from ileostomy.  + void.  Midline incision with dressing, CDI.  Call light and personal belongings within reach.  POC discussed and all questions answered.  No additional needs at this time.

## 2019-01-02 NOTE — PROGRESS NOTES
Surgical Progress Note    Author: Norma Murphyamina Date & Time created: 2019   7:47 AM     Interval Events:  POD#4, s/p exploratory laparotomy, adhesiolysis, takedown of ileostomy, repair of parastomal hernia with placement of Gentrix xenograft hernia matrix, recreation of new ileostomy, ileoscopy using flexible sigmoidoscope.  Reports dissatisfaction with dietary options and dysphagia.  No nausea/vomiting. Admits to typical incisional abdominal pain, controlled with medication. Refuses oral medications including for pain due to dysphagia.  Pt is ambulating and voiding.   Review of Systems   Constitutional: Negative for chills and fever.   HENT: Negative.         Reports dysphagia   Eyes: Negative.    Respiratory: Negative.    Cardiovascular: Negative.    Gastrointestinal: Positive for abdominal pain. Negative for nausea and vomiting.        + flatus   Genitourinary: Negative.    Musculoskeletal: Negative.    Skin: Negative.    Neurological: Negative.    Endo/Heme/Allergies: Negative.    Psychiatric/Behavioral: Positive for depression.     Hemodynamics:  Temp (24hrs), Av.5 °C (97.7 °F), Min:36.3 °C (97.4 °F), Max:36.9 °C (98.4 °F)  Temperature: 36.9 °C (98.4 °F)  Pulse  Av.8  Min: 52  Max: 126  Blood Pressure : 149/68     Respiratory:    Respiration: 18, Pulse Oximetry: 100 %     Work Of Breathing / Effort: Shallow  RUL Breath Sounds: Clear, RML Breath Sounds: Clear, RLL Breath Sounds: Diminished, JACQUI Breath Sounds: Clear, LLL Breath Sounds: Diminished  Neuro:  GCS       Fluids:    Intake/Output Summary (Last 24 hours) at 19 0747  Last data filed at 19 0600   Gross per 24 hour   Intake           3229.5 ml   Output             2725 ml   Net            504.5 ml        Current Diet Order   Procedures   • Diet Order Low Fiber(GI Soft)     Physical Exam   Constitutional: She is oriented to person, place, and time. She appears well-developed and well-nourished.   HENT:   Head: Normocephalic.    Eyes: Conjunctivae are normal.   Neck: Normal range of motion.   Cardiovascular: Normal rate.    Pulmonary/Chest: Effort normal.   Abdominal: Soft. She exhibits distension. There is tenderness.   Ostomy with dark liquid stool, incision c/d/i   Musculoskeletal: Normal range of motion.   Neurological: She is alert and oriented to person, place, and time.   Skin: Skin is warm.   Psychiatric:   Tearful at times     Labs:  Recent Results (from the past 24 hour(s))   APTT    Collection Time: 01/01/19  2:58 PM   Result Value Ref Range    APTT 88.8 (H) 24.7 - 36.0 sec   APTT    Collection Time: 01/01/19  9:29 PM   Result Value Ref Range    APTT 74.6 (H) 24.7 - 36.0 sec   CBC WITH DIFFERENTIAL    Collection Time: 01/02/19  4:24 AM   Result Value Ref Range    WBC 5.7 4.8 - 10.8 K/uL    RBC 3.24 (L) 4.20 - 5.40 M/uL    Hemoglobin 9.8 (L) 12.0 - 16.0 g/dL    Hematocrit 30.1 (L) 37.0 - 47.0 %    MCV 92.9 81.4 - 97.8 fL    MCH 30.2 27.0 - 33.0 pg    MCHC 32.6 (L) 33.6 - 35.0 g/dL    RDW 45.1 35.9 - 50.0 fL    Platelet Count 164 164 - 446 K/uL    MPV 10.4 9.0 - 12.9 fL    Neutrophils-Polys 60.70 44.00 - 72.00 %    Lymphocytes 28.80 22.00 - 41.00 %    Monocytes 6.50 0.00 - 13.40 %    Eosinophils 2.80 0.00 - 6.90 %    Basophils 0.50 0.00 - 1.80 %    Immature Granulocytes 0.70 0.00 - 0.90 %    Nucleated RBC 0.00 /100 WBC    Neutrophils (Absolute) 3.47 2.00 - 7.15 K/uL    Lymphs (Absolute) 1.65 1.00 - 4.80 K/uL    Monos (Absolute) 0.37 0.00 - 0.85 K/uL    Eos (Absolute) 0.16 0.00 - 0.51 K/uL    Baso (Absolute) 0.03 0.00 - 0.12 K/uL    Immature Granulocytes (abs) 0.04 0.00 - 0.11 K/uL    NRBC (Absolute) 0.00 K/uL   BASIC METABOLIC PANEL    Collection Time: 01/02/19  4:24 AM   Result Value Ref Range    Sodium 140 135 - 145 mmol/L    Potassium 3.5 (L) 3.6 - 5.5 mmol/L    Chloride 111 96 - 112 mmol/L    Co2 24 20 - 33 mmol/L    Glucose 98 65 - 99 mg/dL    Bun 5 (L) 8 - 22 mg/dL    Creatinine 0.59 0.50 - 1.40 mg/dL    Calcium 9.2 8.5 -  10.5 mg/dL    Anion Gap 5.0 0.0 - 11.9   MAGNESIUM    Collection Time: 01/02/19  4:24 AM   Result Value Ref Range    Magnesium 1.6 1.5 - 2.5 mg/dL   APTT    Collection Time: 01/02/19  4:24 AM   Result Value Ref Range    APTT 98.8 (H) 24.7 - 36.0 sec   ESTIMATED GFR    Collection Time: 01/02/19  4:24 AM   Result Value Ref Range    GFR If African American >60 >60 mL/min/1.73 m 2    GFR If Non African American >60 >60 mL/min/1.73 m 2     Medical Decision Making, by Problem:  Active Hospital Problems    Diagnosis   • 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[facility]) [K94.13]     Priority: High   • Crohn's disease of small intestine with complication ([facility]) [K50.019]     Priority: High     Followed by GI Dr. Cruz  S/p ileostomy  Scope 5/2014-no strictures, fistulas, or new abscesses     • DVT (deep venous thrombosis) ([facility]) [I82.409]   • History of MRSA infection [Z86.14]   • History of infection with vancomycin resistant Enterococcus (VRE) [Z86.19]   • Intractable cyclical vomiting with nausea [G43.A1]   • Dysphasia [R47.02]   • Hypokalemia [E87.6]   • DDD (degenerative disc disease), lumbar [M51.36]   • Paroxysmal atrial fibrillation (HCC) [I48.0]   • Chronic pain [G89.29]     On multiple narcotics including high dose oxycodone and Dilaudid  Valium also on medication list     • Opioid type dependence, continuous (CMS-HCC) [F11.20]     Plan:  Pt is alert and oriented, NAD. Breathing unlabored. Difficulty tolerating PO.  Abdomen is soft, mildly distended, tender at incision sites. Incisions C/D/I. VS stable. Labs reviewed-Hgb 9.8. Encouraged ambulation and incentive spirometry. Advised that surgically she looks ready for discharge however she became very agitated with this.  Nutrition to consult and discuss dietary options. Discussed with Dr. Cruz.    Quality Measures:  Quality-Core Measures   Reviewed items::  Labs reviewed and Medications reviewed  Sage catheter::  No Sage  DVT prophylaxis pharmacological::  Heparin      Discussed patient  condition with Patient and general surgery-Dr. Cruz.

## 2019-01-03 ENCOUNTER — APPOINTMENT (OUTPATIENT)
Dept: RADIOLOGY | Facility: MEDICAL CENTER | Age: 64
DRG: 330 | End: 2019-01-03
Attending: PHYSICIAN ASSISTANT
Payer: MEDICARE

## 2019-01-03 PROBLEM — R60.0 PEDAL EDEMA: Status: ACTIVE | Noted: 2019-01-03

## 2019-01-03 LAB
ANION GAP SERPL CALC-SCNC: 5 MMOL/L (ref 0–11.9)
APTT PPP: 73.1 SEC (ref 24.7–36)
BUN SERPL-MCNC: 4 MG/DL (ref 8–22)
CALCIUM SERPL-MCNC: 9 MG/DL (ref 8.5–10.5)
CHLORIDE SERPL-SCNC: 107 MMOL/L (ref 96–112)
CO2 SERPL-SCNC: 27 MMOL/L (ref 20–33)
CREAT SERPL-MCNC: 0.77 MG/DL (ref 0.5–1.4)
GLUCOSE SERPL-MCNC: 91 MG/DL (ref 65–99)
MAGNESIUM SERPL-MCNC: 1.4 MG/DL (ref 1.5–2.5)
POTASSIUM SERPL-SCNC: 3.5 MMOL/L (ref 3.6–5.5)
SODIUM SERPL-SCNC: 139 MMOL/L (ref 135–145)

## 2019-01-03 PROCEDURE — A9270 NON-COVERED ITEM OR SERVICE: HCPCS | Performed by: INTERNAL MEDICINE

## 2019-01-03 PROCEDURE — 700102 HCHG RX REV CODE 250 W/ 637 OVERRIDE(OP): Performed by: INTERNAL MEDICINE

## 2019-01-03 PROCEDURE — 665998 HH PPS REVENUE CREDIT

## 2019-01-03 PROCEDURE — 83735 ASSAY OF MAGNESIUM: CPT

## 2019-01-03 PROCEDURE — 700111 HCHG RX REV CODE 636 W/ 250 OVERRIDE (IP): Performed by: HOSPITALIST

## 2019-01-03 PROCEDURE — 700111 HCHG RX REV CODE 636 W/ 250 OVERRIDE (IP): Performed by: INTERNAL MEDICINE

## 2019-01-03 PROCEDURE — 665999 HH PPS REVENUE DEBIT

## 2019-01-03 PROCEDURE — 302255 BARRIER CREAM MOISTURE BAZA PROTECT (ZINC) 5OZ: Performed by: INTERNAL MEDICINE

## 2019-01-03 PROCEDURE — 92610 EVALUATE SWALLOWING FUNCTION: CPT

## 2019-01-03 PROCEDURE — 80048 BASIC METABOLIC PNL TOTAL CA: CPT

## 2019-01-03 PROCEDURE — 770006 HCHG ROOM/CARE - MED/SURG/GYN SEMI*

## 2019-01-03 PROCEDURE — 99233 SBSQ HOSP IP/OBS HIGH 50: CPT | Performed by: INTERNAL MEDICINE

## 2019-01-03 PROCEDURE — 85730 THROMBOPLASTIN TIME PARTIAL: CPT

## 2019-01-03 RX ORDER — FUROSEMIDE 10 MG/ML
20 INJECTION INTRAMUSCULAR; INTRAVENOUS
Status: DISCONTINUED | OUTPATIENT
Start: 2019-01-03 | End: 2019-01-05

## 2019-01-03 RX ADMIN — HYDROMORPHONE HYDROCHLORIDE 1 MG: 1 INJECTION, SOLUTION INTRAMUSCULAR; INTRAVENOUS; SUBCUTANEOUS at 15:24

## 2019-01-03 RX ADMIN — FUROSEMIDE 20 MG: 10 INJECTION, SOLUTION INTRAMUSCULAR; INTRAVENOUS at 12:13

## 2019-01-03 RX ADMIN — ONDANSETRON HYDROCHLORIDE 4 MG: 2 INJECTION, SOLUTION INTRAMUSCULAR; INTRAVENOUS at 18:27

## 2019-01-03 RX ADMIN — PROCHLORPERAZINE EDISYLATE 10 MG: 5 INJECTION INTRAMUSCULAR; INTRAVENOUS at 22:05

## 2019-01-03 RX ADMIN — ONDANSETRON HYDROCHLORIDE 4 MG: 2 INJECTION, SOLUTION INTRAMUSCULAR; INTRAVENOUS at 12:21

## 2019-01-03 RX ADMIN — HYDROMORPHONE HYDROCHLORIDE 1 MG: 1 INJECTION, SOLUTION INTRAMUSCULAR; INTRAVENOUS; SUBCUTANEOUS at 05:01

## 2019-01-03 RX ADMIN — ONDANSETRON HYDROCHLORIDE 4 MG: 2 INJECTION, SOLUTION INTRAMUSCULAR; INTRAVENOUS at 05:01

## 2019-01-03 RX ADMIN — HYDROMORPHONE HYDROCHLORIDE 1 MG: 1 INJECTION, SOLUTION INTRAMUSCULAR; INTRAVENOUS; SUBCUTANEOUS at 08:42

## 2019-01-03 RX ADMIN — HYDROMORPHONE HYDROCHLORIDE 1 MG: 1 INJECTION, SOLUTION INTRAMUSCULAR; INTRAVENOUS; SUBCUTANEOUS at 18:27

## 2019-01-03 RX ADMIN — METOPROLOL TARTRATE 25 MG: 25 TABLET, FILM COATED ORAL at 20:46

## 2019-01-03 RX ADMIN — HYDROMORPHONE HYDROCHLORIDE 1 MG: 1 INJECTION, SOLUTION INTRAMUSCULAR; INTRAVENOUS; SUBCUTANEOUS at 12:14

## 2019-01-03 RX ADMIN — HYDROMORPHONE HYDROCHLORIDE 1 MG: 1 INJECTION, SOLUTION INTRAMUSCULAR; INTRAVENOUS; SUBCUTANEOUS at 21:33

## 2019-01-03 RX ADMIN — PROCHLORPERAZINE EDISYLATE 10 MG: 5 INJECTION INTRAMUSCULAR; INTRAVENOUS at 08:42

## 2019-01-03 RX ADMIN — ONDANSETRON HYDROCHLORIDE 4 MG: 2 INJECTION, SOLUTION INTRAMUSCULAR; INTRAVENOUS at 22:22

## 2019-01-03 RX ADMIN — PROCHLORPERAZINE EDISYLATE 10 MG: 5 INJECTION INTRAMUSCULAR; INTRAVENOUS at 15:55

## 2019-01-03 ASSESSMENT — PAIN SCALES - GENERAL
PAINLEVEL_OUTOF10: 10
PAINLEVEL_OUTOF10: 10
PAINLEVEL_OUTOF10: 8
PAINLEVEL_OUTOF10: 10
PAINLEVEL_OUTOF10: 10
PAINLEVEL_OUTOF10: 9
PAINLEVEL_OUTOF10: 10
PAINLEVEL_OUTOF10: 10
PAINLEVEL_OUTOF10: 8
PAINLEVEL_OUTOF10: 8
PAINLEVEL_OUTOF10: 9

## 2019-01-03 ASSESSMENT — ENCOUNTER SYMPTOMS
CHILLS: 0
DIZZINESS: 0
NAUSEA: 1
DIARRHEA: 0
ABDOMINAL PAIN: 1
COUGH: 0
FEVER: 0
SHORTNESS OF BREATH: 0
DEPRESSION: 0
VOMITING: 1
HEARTBURN: 0
NERVOUS/ANXIOUS: 1
VOMITING: 0
EYE PAIN: 0
PALPITATIONS: 0
WEAKNESS: 1
CONSTIPATION: 0

## 2019-01-03 NOTE — PROGRESS NOTES
PCA discontinued per MD order.  Patient became agitated and verbally aggressive when RN attempted education on weaning IV pain medications and using oral pain medications, which is baseline for patient.  Patient states she has had multiple episodes of emesis today, but this RN or CNA today has not seen emesis.  RN asked patient to save emesis, so RN is able to inspect and evaluate it, patient verbalizes understanding.  RN premedicated patient for nausea, then administered PRN pain medications.  Patient currently states she is not nauseous.

## 2019-01-03 NOTE — PROGRESS NOTES
Surgical Progress Note    Author: Lilly Johnson Date & Time created: 1/3/2019   8:02 AM     Interval Events:  POD#3, exploratory laparotomy, adhesiolysis, takedown of ileostomy, repair of parastomal hernia with placement of Gentrix xenograft hernia matrix, recreation of new ileostomy, ileoscopy using flexible sigmoidoscope.    Pt sitting up in bed this morning. She now reports nausea/vomiting on Dysphagia 2 diet. However nursing reporting that she has not seen vomiting, only frothing/clear saliva in emesis bag. She has similar issues prior to surgery.  Admits to incisional abdominal pain, somewhat controlled with medications, difficult transition to oral pain meds as patient reports that she does not tolerate many orals.  +stool and gas in ostomy appliance. Increased bilateral LE swelling, now with pitting in feet and ankles.    Review of Systems   Constitutional: Negative for chills and fever.   Respiratory: Negative for cough and shortness of breath.    Cardiovascular: Negative for chest pain and palpitations.   Gastrointestinal: Positive for abdominal pain (incisional), nausea and vomiting. Negative for constipation, diarrhea and heartburn.        +stool and gas in ostomy appliance   Genitourinary: Negative for dysuria.   Musculoskeletal:        C/o swelling in BLE, RLE swelling > left     Hemodynamics:  Temp (24hrs), Av.4 °C (97.6 °F), Min:36 °C (96.8 °F), Max:36.9 °C (98.4 °F)  Temperature: 36.4 °C (97.5 °F)  Pulse  Av.6  Min: 52  Max: 126  Blood Pressure : 132/80     Respiratory:    Respiration: 18, Pulse Oximetry: 96 %        RUL Breath Sounds: Clear, RML Breath Sounds: Clear, RLL Breath Sounds: Diminished, JACQUI Breath Sounds: Clear, LLL Breath Sounds: Diminished  Neuro:  GCS       Fluids:    Intake/Output Summary (Last 24 hours) at 19 0802  Last data filed at 19 0650   Gross per 24 hour   Intake              967 ml   Output             4525 ml   Net            -3558 ml          Current Diet  Order   Procedures   • Diet Order Low Fiber(GI Soft)     Physical Exam   Constitutional: She is oriented to person, place, and time. She appears well-developed and well-nourished. No distress.   HENT:   Head: Normocephalic and atraumatic.   Eyes: Conjunctivae are normal.   Neck: Normal range of motion. Neck supple.   Cardiovascular: Normal rate and regular rhythm.    Pulmonary/Chest: Effort normal. No respiratory distress.   Abdominal: Soft. She exhibits distension (mild). She exhibits no mass. There is tenderness (incisional). There is no rebound and no guarding.   Ostomy pink with stool and gas output   Musculoskeletal: Normal range of motion. She exhibits edema (pitting in feet/ankles). She exhibits no tenderness.   Neurological: She is alert and oriented to person, place, and time.   Skin: Skin is warm and dry. No rash noted. No erythema. No pallor.   Incision with staples.   Psychiatric: She has a normal mood and affect.     Labs:  Recent Results (from the past 24 hour(s))   APTT    Collection Time: 01/02/19 11:42 AM   Result Value Ref Range    APTT 45.6 (H) 24.7 - 36.0 sec   APTT    Collection Time: 01/02/19  8:00 PM   Result Value Ref Range    APTT 86.1 (H) 24.7 - 36.0 sec   BASIC METABOLIC PANEL    Collection Time: 01/03/19  2:35 AM   Result Value Ref Range    Sodium 139 135 - 145 mmol/L    Potassium 3.5 (L) 3.6 - 5.5 mmol/L    Chloride 107 96 - 112 mmol/L    Co2 27 20 - 33 mmol/L    Glucose 91 65 - 99 mg/dL    Bun 4 (L) 8 - 22 mg/dL    Creatinine 0.77 0.50 - 1.40 mg/dL    Calcium 9.0 8.5 - 10.5 mg/dL    Anion Gap 5.0 0.0 - 11.9   MAGNESIUM    Collection Time: 01/03/19  2:35 AM   Result Value Ref Range    Magnesium 1.4 (L) 1.5 - 2.5 mg/dL   APTT    Collection Time: 01/03/19  2:35 AM   Result Value Ref Range    APTT 73.1 (H) 24.7 - 36.0 sec   ESTIMATED GFR    Collection Time: 01/03/19  2:35 AM   Result Value Ref Range    GFR If African American >60 >60 mL/min/1.73 m 2    GFR If Non  >60 >60  mL/min/1.73 m 2     Medical Decision Making, by Problem:  Active Hospital Problems    Diagnosis   • 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[facility]) [K94.13]     Priority: High   • Crohn's disease of small intestine with complication ([facility]) [K50.019]     Priority: High     Followed by GI Dr. Cruz  S/p ileostomy  Scope 5/2014-no strictures, fistulas, or new abscesses     • DVT (deep venous thrombosis) ([facility]) [I82.409]   • History of MRSA infection [Z86.14]   • History of infection with vancomycin resistant Enterococcus (VRE) [Z86.19]   • Intractable cyclical vomiting with nausea [G43.A1]   • Dysphasia [R47.02]   • Hypokalemia [E87.6]   • DDD (degenerative disc disease), lumbar [M51.36]   • Paroxysmal atrial fibrillation ([facility]) [I48.0]   • Chronic pain [G89.29]     On multiple narcotics including high dose oxycodone and Dilaudid  Valium also on medication list     • Opioid type dependence, continuous (CMS-[facility]) [F11.20]     Plan:  Pt is alert and oriented, NAD. Breathing unlabored. Incision with staples ok. VS stable. Labs reviewed. Encouraged ambulation and incentive spirometry.  Good ostomy function. Difficult pain control with refusal oral options.  Increased bilateral LE swelling, now with pitting edema in feet and ankles. Consider diuretics. Pt with reported nausea/vomiting. UGI today. Pt also seen and examined by Dr. Cruz.      Quality Measures:  Quality-Core Measures   Reviewed items::  Labs reviewed  Sage catheter::  No Sage  DVT prophylaxis pharmacological::  Enoxaparin (Lovenox)  DVT prophylaxis - mechanical:  SCDs      Discussed patient condition with RN, Patient and Dr. Cruz

## 2019-01-03 NOTE — PROGRESS NOTES
Received report from day shift RN. Assumed patient care  AOx4  Pain rated at 8/10, medicated per MAR  Room air  LLQ ileostomy with moderate brown/loose output  Midline incision with dressing in place  Left upper arm midline running TKO.   Right PIV with heparin drip infusing, next APTT at 0200 1/3  Up 1 assist with FWW  Patient is very anxious regarding nausea/pain regimen and POC. Educated patient regarding POC and medications.  +void  Patient had nausea/vomitting after eating dinner. Dysphagia 2 nectar thick GI soft low fiber diet in place  Call light within reach  Bed locked and in low position   All needs met at this time.

## 2019-01-03 NOTE — CARE PLAN
Problem: Infection  Goal: Will remain free from infection  Outcome: PROGRESSING AS EXPECTED  Handwashing prior and after each patient encounter     Problem: Bowel/Gastric:  Goal: Normal bowel function is maintained or improved  Outcome: PROGRESSING AS EXPECTED  + output through ostomy

## 2019-01-03 NOTE — PROGRESS NOTES
Bedside report completed.  A&O x4.  In no acute distress. Anxious.   /80   Pulse 84   Temp 36.4 °C (97.5 °F) (Temporal)   Resp 18   Ht 1.829 m (6')   Wt 76.6 kg (168 lb 14 oz)   SpO2 96%   Breastfeeding? No   BMI 22.90 kg/m²   RUE with heparin infusing at 950 units/hr.  LUE midline, TKO.    Ambulating with SB assistance with FWW, steady gait.  + nausea, - emesis.  Medicated per MAR.   C/o 9/10 pain, medicated with dilaudid per MAR.  Refusing PO pain medications at this time.   Midline abdominal incision with dressing, CDI.   3+ BLE pitting edema.    Ostomy, + output.   + void.  Call light and personal belongings within reach.   POC discussed and all questions answered.  Hourly rounding and fall precautions in place.  No additional needs at this time.

## 2019-01-03 NOTE — PROGRESS NOTES
"1415:  Patient called RN into room asking for as needed pain medications.  RN educated patient that the only as needed pain medication currently available was the oxycodone 30 mg.  Patient became agitated and verbally aggressive towards RN.  RN attempted to educate patient that there was oral solution OR tablets for the oxycodone route.  Patient refusing to take any pills at this time due to \"aspiration/dysphagia,\"  Although this RN or CNA have not witnesses any emesis, dysphagia or aspiration with medications/food/fluid administration.  RN offered oral solution, patient refused stating the solution [facility] carries \"makes my mouth break out in a rash because the orange dye.\"  RN educated patient that the solution is clear, and patient did not have reaction to medication yesterday when this RN administered.  Patient continues to refuse oral pain medications.  RN to bring in IV pain medications when available.     1430: RN receives call from CNA stating patient is demanding a meeting with the doctor and states if she doesn't talk to him \"I am about to start throwing things.\"    1440:  RN and Dr. Palmer at bedside, patient's  also at bedside.  Patient extremely upset and agitated, and continues to be verbally agressive with RN and MD.  MD extensively educates patient/ on importance to wean IV pain medications and to transition to PO pain medications for discharge home.  MD also educates patient/ on his reasoning for not increasing current IV breakthrough dosage or frequency (currently hydromorphone 1mg q3h).  Patient became irate and asked when she can get \"checked out\" since \"you are not doing anything for me.\"  MD and RN educated on not being cleared for discharge and importance of remaining in the hospital for health and safety purposes.  Patient requesting to speak to Dr. Cruz.  RN to page Dr. Cruz.     1520:  RN medicated patient with PRN dilaudid per MAR.  After speaking with pharmacy and " "MD, RN suggested to patient that she can bring in her personal oxycodone oral solution medication, since patient does not have an allergy to the brand she receives.  Bottle must be unopened and not tampered with, and medication must be verified by hospital pharmacy and administered by RN.   states he will go get medication at home, but patient \"unsure if I will take it or not.\"    "

## 2019-01-03 NOTE — PROGRESS NOTES
[facility] Medicine Daily Progress Note    Date of Service  1/2/2019    Chief Complaint  Abdominal pain    Hospital Course    63 y.o. female with PMH Crohn's with previous colectomy with ileostomy admitted 12/28/2018 with generalized abdominal pain, nausea decreased output from her ileostomy.  Recently hospitalized at [facility] and evaluated by Dr. Nunez.  Patient was evaluated by surgery.  Patient underwent surgery ostomy revision and takedown of a new ostomy creation 12/29.  Patient tolerated procedure very well.      Interval Problem Update  AB surgery-pain was quite severe this AM, but good ostomy output, no issues, no blood in the stool. Very anxious and upset about various things this AM. Large amount of foot swelling this AM, she is urinating well.     DVT-no new pain in her legs other than swelling as noted above. Tolerating her heparin gtt, does not want any solid pills at home, she prefer to use lovenox injections at home.       Consultants/Specialty  -General surgery    Code Status  Full    Disposition  TBD    Review of Systems  Review of Systems   Constitutional: Negative for chills and fever.   HENT: Negative for hearing loss.    Eyes: Negative for pain.   Respiratory: Negative for shortness of breath.    Cardiovascular: Positive for leg swelling (worse around the feet today). Negative for chest pain and palpitations.   Gastrointestinal: Positive for abdominal pain (better controlled today). Negative for constipation, diarrhea, nausea and vomiting.   Genitourinary: Negative for dysuria.   Skin: Negative for rash.   Neurological: Positive for weakness (no changes noted today). Negative for dizziness.   Psychiatric/Behavioral: Negative for depression. The patient is nervous/anxious (very anxious).    All other systems reviewed and are negative.       Physical Exam  Temp:  [36.3 °C (97.4 °F)-36.9 °C (98.4 °F)] 36.5 °C (97.7 °F)  Pulse:  [] 101  Resp:  [17-18] 18  BP: (112-149)/(55-87)  146/87    Physical Exam   Constitutional: She is oriented to person, place, and time. Vital signs are normal. She appears well-developed. She is cooperative.   HENT:   Head: Normocephalic.   Right Ear: Hearing normal.   Left Ear: Hearing normal.   Nose: Nose normal.   Mouth/Throat: Oropharynx is clear and moist and mucous membranes are normal. No oropharyngeal exudate.   Eyes: Pupils are equal, round, and reactive to light. Conjunctivae and EOM are normal. Right eye exhibits no discharge. Left eye exhibits no discharge.   Neck: Normal range of motion and phonation normal. Neck supple.   Cardiovascular: Regular rhythm, normal heart sounds and intact distal pulses.  Tachycardia present.    No murmur heard.  No edema  Cap refill WNL   Pulmonary/Chest: Effort normal and breath sounds normal. No stridor. No respiratory distress. She has no rhonchi.   Abdominal: Soft. Normal appearance. Bowel sounds are decreased. There is generalized tenderness.   Ileostomy with liquid, orange color output, no leakage noted  Stoma appears pink and healthy  Midline abdominal vertical incision with staples in place, appears C/D/I, minimal TTP, no clear change noted   Musculoskeletal: Normal range of motion. She exhibits edema (worsene pedal B/L, warm). She exhibits no tenderness.   Neurological: She is alert and oriented to person, place, and time. She has normal strength.   Skin: Skin is warm and dry. She is not diaphoretic. No pallor.   Psychiatric: Her mood appears not anxious. Her speech is not rapid and/or pressured.   Very anxious and crying today   Nursing note and vitals reviewed.      Fluids    Intake/Output Summary (Last 24 hours) at 01/02/19 1610  Last data filed at 01/02/19 1200   Gross per 24 hour   Intake           2007.5 ml   Output             2725 ml   Net           -717.5 ml       Laboratory  Recent Labs      12/31/18   0407  01/01/19   0024  01/02/19   0424   WBC  9.0  7.9  5.7   RBC  3.72*  3.58*  3.24*   HEMOGLOBIN   11.5*  10.8*  9.8*   HEMATOCRIT  36.0*  34.8*  30.1*   MCV  96.8  97.2  92.9   MCH  30.9  30.2  30.2   MCHC  31.9*  31.0*  32.6*   RDW  48.1  48.4  45.1   PLATELETCT  196  211  164   MPV  10.5  10.6  10.4     Recent Labs      12/31/18   0407  01/01/19   0024  01/02/19   0424   SODIUM  139  139  140   POTASSIUM  4.3  4.0  3.5*   CHLORIDE  110  108  111   CO2  19*  24  24   GLUCOSE  106*  88  98   BUN  12  7*  5*   CREATININE  0.80  0.68  0.59   CALCIUM  9.4  9.3  9.2     Recent Labs      12/31/18   1630   01/01/19   2129  01/02/19   0424  01/02/19   1142   APTT  31.2   < >  74.6*  98.8*  45.6*   INR  1.24*   --    --    --    --     < > = values in this interval not displayed.               Imaging  US-EXTREMITY VENOUS LOWER BILAT   Final Result      IR-US GUIDED PIV   Final Result    Ultrasound-guided PERIPHERAL IV INSERTION performed by    qualified nursing staff as above.            IR-MIDLINE CATHETER INSERTION >5 YRS    (Results Pending)        Assessment/Plan  * Intractable cyclical vomiting with nausea- (present on admission)   Assessment & Plan    -nausea is greatly improved today, continue conservative management with IV anti-emetics PRN  -Tolerated surgery very well.  -IV fluid hydration with KCl       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HCC)- (present on admission)   Assessment & Plan    Likely to be the reason for nausea vomiting.  Status post surgery 12/30  Tolerated procedure very well.  Continue monitor by surgery.  -dilaudid PCA, adjusted down even further today, will need to fully taper off soon  -good output now, non bloody and increasing her appetite today, advance diet to GI soft  -patient is very anxious this AM, consider low dose anxiolytic PRN     Crohn's disease of small intestine with complication (HCC)- (present on admission)   Assessment & Plan    -Followed as outpatient by Dr. Cruz  -Not currently in acute exacerbation       DVT (deep venous thrombosis) (HCC)   Assessment & Plan    Patient does have a  history of DVT.  Ultrasound today showing newly developed a DVT, likely provoked by recent surgery  -continue weight based heparin gtt, tolerating well, goal PTT 60-90, check PTT Q6 hours, adjust PRN  -she refuses oral AC, will need to transition to sq lovenox on discharge, no bleeding issues at this time  -likely total time period for AC will be 3-6 months     History of infection with vancomycin resistant Enterococcus (VRE)- (present on admission)   Assessment & Plan    -remote history of     History of MRSA infection- (present on admission)   Assessment & Plan    -Per chart review (+) MRSA nares 2014     Dysphasia- (present on admission)   Assessment & Plan    -History of  -Followed by GI as outpatient  -Aspiration precautions  -SLP eval  -Resume diet per surgery.     Hypokalemia- (present on admission)   Assessment & Plan    -resolved, replace PRN     DDD (degenerative disc disease), lumbar- (present on admission)   Assessment & Plan    -Chronic pain management     Paroxysmal atrial fibrillation (HCC)- (present on admission)   Assessment & Plan    -Not currently on chronic anticoagulation secondary to Crohn's  -Continue home metoprolol  -Baby ASA, hold while on heparin  -no current issues       Opioid type dependence, continuous (CMS-HCC)- (present on admission)   Assessment & Plan    -Continue home oxycodone     Chronic pain- (present on admission)   Assessment & Plan    -On chronic opioid management -Dr. Telles  -Bowel protocol            VTE prophylaxis: heparin gtt                Félix Palmer M.D.

## 2019-01-03 NOTE — THERAPY
"Speech Language Therapy Clinical Swallow Evaluation completed.     Functional Status: Pt was seen at bedside for CSE. Pt was alert, oriented x4 and fully participatory with all therapy objectives. Pt endorsed hx of pharyngeal dysphagia, and has previously participated with a Modified Barium Swallow Study on 12/18/2008. Study revealed: \"delayed oral phase, trace aspiration with thin liquids, vallecular pooling which cleared with chin tuck and effortful swallow.\"  She was seen by SLP [place] 12/13 and 12/14/2018 and was cleared for dys II/thin liquids.  She reports that for years her home diet consists of pureed foods, some dysphagia II textures and mostly nectar thick liquids, but at times will take sips of thin liquids (especially \"flat\" sodas).  Presently:  She denies any coughing or choking when eating unless she is eating at a sub optimal angle in bed. She reports that foods often get stuck in her esophagus (points to UES region) and pills will also get stuck there and dissolve.  She does follow with GI, but has not been scoped \"in a long time.\"  She also reports she has had episodes of reflux as well as frothy secretions that she will often spit up.  Baseline voice today was slightly gurgly, but once she coughed up a mouthful of these frothy secretions, she did have clear voicing for the rest of the study.     FUNCTIONAL STATUS:  Oral mechanism evaluation revealed minimally impaired ROM which she reports is baseline.  There were minimal amounts of secretions noted at the posterior oral cavity.  There were no gross anatomical abnormalities present. Presentation of PO consisted of ice chips, dysphagia II diet tray as well as purees, nectars and thin liquids. Pt demonstrated no subtle nor overt clinical s/sx of aspiration with any of the textures.  She did have c/o globus sensation with purees and soft solids, but this sensation did subside when she used a liquid wash to clear.  She did use chin tuck posture " "when swallowing and reports that this has worked best for her.  Following swallows of thin liquids, she did have some belching noted that she reports is typical.  She did need to fork mash her fruit, and felt that it would be better if it was pureed.  At this time, would recommend dysphagia II diet with pureed fruits and pureed vegetables (per patient request)--please add extra gravies to all trays per patient request.   Nectar thick liquids on tray, but ok for sips of thin liquids as tolerated. I did discuss recommendations with Dietitian and with Dr. Palmer.  Also, given esophageal issues and reports of globus, agree with esophageal study that Dr. Cruz has already ordered.  Would also like to have a barium tablet included as part of this study to assess passage through the esophagus.  SLP will continue to follow. Thank you for the consult.      Recommendations - Diet: dysphagia II diet with pureed fruits and pureed vegetables: extra gravies:  Nectar thick liquids on tray, but ok for sips of thin liquids as tolerated                             Strategies: out of chair for all meals and maintain upright position for 30-45 minutes following PO.  Chin tuck, Small bites/sips                          Medication Administration:  Whole with Liquid Wash, Float Whole with Puree, (Per preference/tolerance)     Plan of Care: Will benefit from Speech Therapy 3 times per week    Post-Acute Therapy: Recommend outpatient or home health transitional care services for continued speech therapy services     See \"Rehab Therapy-Acute\" Patient Summary Report for complete documentation.   "

## 2019-01-03 NOTE — PROGRESS NOTES
"Patient insisted sitting out in the hallway instead of sitting in her room or be in bed. Patient stated \"I just want my neighbor to have a good night sleep\". Patient currently sitting out in hallway, charge RN aware.   "

## 2019-01-03 NOTE — PROGRESS NOTES
"Patient is complaining of 10/10 pain. Educated patient that she needs to take the PO oxycodone. Patient refuses to take PO oxycodone. Patient states \"I have sores in my mouth, it burns. I know my rights, I refuse to take it\".   "

## 2019-01-04 ENCOUNTER — APPOINTMENT (OUTPATIENT)
Dept: RADIOLOGY | Facility: MEDICAL CENTER | Age: 64
DRG: 330 | End: 2019-01-04
Attending: PHYSICIAN ASSISTANT
Payer: MEDICARE

## 2019-01-04 LAB
ALBUMIN SERPL BCP-MCNC: 3.1 G/DL (ref 3.2–4.9)
ALBUMIN/GLOB SERPL: 1.3 G/DL
ALP SERPL-CCNC: 68 U/L (ref 30–99)
ALT SERPL-CCNC: 10 U/L (ref 2–50)
ANION GAP SERPL CALC-SCNC: 7 MMOL/L (ref 0–11.9)
APTT PPP: 101.2 SEC (ref 24.7–36)
APTT PPP: 53.9 SEC (ref 24.7–36)
APTT PPP: 58.2 SEC (ref 24.7–36)
AST SERPL-CCNC: 10 U/L (ref 12–45)
BASOPHILS # BLD AUTO: 0.3 % (ref 0–1.8)
BASOPHILS # BLD: 0.02 K/UL (ref 0–0.12)
BILIRUB SERPL-MCNC: 0.3 MG/DL (ref 0.1–1.5)
BUN SERPL-MCNC: 4 MG/DL (ref 8–22)
CALCIUM SERPL-MCNC: 9.1 MG/DL (ref 8.5–10.5)
CHLORIDE SERPL-SCNC: 106 MMOL/L (ref 96–112)
CO2 SERPL-SCNC: 29 MMOL/L (ref 20–33)
CREAT SERPL-MCNC: 0.75 MG/DL (ref 0.5–1.4)
EOSINOPHIL # BLD AUTO: 0.18 K/UL (ref 0–0.51)
EOSINOPHIL NFR BLD: 2.6 % (ref 0–6.9)
ERYTHROCYTE [DISTWIDTH] IN BLOOD BY AUTOMATED COUNT: 44.2 FL (ref 35.9–50)
GLOBULIN SER CALC-MCNC: 2.4 G/DL (ref 1.9–3.5)
GLUCOSE SERPL-MCNC: 94 MG/DL (ref 65–99)
HCT VFR BLD AUTO: 30.5 % (ref 37–47)
HGB BLD-MCNC: 10.1 G/DL (ref 12–16)
IMM GRANULOCYTES # BLD AUTO: 0.02 K/UL (ref 0–0.11)
IMM GRANULOCYTES NFR BLD AUTO: 0.3 % (ref 0–0.9)
LYMPHOCYTES # BLD AUTO: 1.75 K/UL (ref 1–4.8)
LYMPHOCYTES NFR BLD: 25.1 % (ref 22–41)
MAGNESIUM SERPL-MCNC: 1.4 MG/DL (ref 1.5–2.5)
MCH RBC QN AUTO: 30.4 PG (ref 27–33)
MCHC RBC AUTO-ENTMCNC: 33.1 G/DL (ref 33.6–35)
MCV RBC AUTO: 91.9 FL (ref 81.4–97.8)
MONOCYTES # BLD AUTO: 0.65 K/UL (ref 0–0.85)
MONOCYTES NFR BLD AUTO: 9.3 % (ref 0–13.4)
NEUTROPHILS # BLD AUTO: 4.35 K/UL (ref 2–7.15)
NEUTROPHILS NFR BLD: 62.4 % (ref 44–72)
NRBC # BLD AUTO: 0 K/UL
NRBC BLD-RTO: 0 /100 WBC
PLATELET # BLD AUTO: 200 K/UL (ref 164–446)
PMV BLD AUTO: 10.1 FL (ref 9–12.9)
POTASSIUM SERPL-SCNC: 3.3 MMOL/L (ref 3.6–5.5)
PROT SERPL-MCNC: 5.5 G/DL (ref 6–8.2)
RBC # BLD AUTO: 3.32 M/UL (ref 4.2–5.4)
SODIUM SERPL-SCNC: 142 MMOL/L (ref 135–145)
WBC # BLD AUTO: 7 K/UL (ref 4.8–10.8)

## 2019-01-04 PROCEDURE — 99233 SBSQ HOSP IP/OBS HIGH 50: CPT | Performed by: INTERNAL MEDICINE

## 2019-01-04 PROCEDURE — 665998 HH PPS REVENUE CREDIT

## 2019-01-04 PROCEDURE — 700111 HCHG RX REV CODE 636 W/ 250 OVERRIDE (IP): Performed by: INTERNAL MEDICINE

## 2019-01-04 PROCEDURE — A9270 NON-COVERED ITEM OR SERVICE: HCPCS | Performed by: INTERNAL MEDICINE

## 2019-01-04 PROCEDURE — 700102 HCHG RX REV CODE 250 W/ 637 OVERRIDE(OP): Performed by: INTERNAL MEDICINE

## 2019-01-04 PROCEDURE — A9270 NON-COVERED ITEM OR SERVICE: HCPCS | Performed by: HOSPITALIST

## 2019-01-04 PROCEDURE — 700117 HCHG RX CONTRAST REV CODE 255: Performed by: PHYSICIAN ASSISTANT

## 2019-01-04 PROCEDURE — 83735 ASSAY OF MAGNESIUM: CPT

## 2019-01-04 PROCEDURE — 97535 SELF CARE MNGMENT TRAINING: CPT

## 2019-01-04 PROCEDURE — 700111 HCHG RX REV CODE 636 W/ 250 OVERRIDE (IP): Performed by: HOSPITALIST

## 2019-01-04 PROCEDURE — 80053 COMPREHEN METABOLIC PANEL: CPT

## 2019-01-04 PROCEDURE — 770006 HCHG ROOM/CARE - MED/SURG/GYN SEMI*

## 2019-01-04 PROCEDURE — 700102 HCHG RX REV CODE 250 W/ 637 OVERRIDE(OP): Performed by: HOSPITALIST

## 2019-01-04 PROCEDURE — 85730 THROMBOPLASTIN TIME PARTIAL: CPT | Mod: 91

## 2019-01-04 PROCEDURE — 74241 DX-UPPER GI-SERIES WITH KUB: CPT

## 2019-01-04 PROCEDURE — 85025 COMPLETE CBC W/AUTO DIFF WBC: CPT

## 2019-01-04 PROCEDURE — 665999 HH PPS REVENUE DEBIT

## 2019-01-04 RX ORDER — OXYCODONE HCL 20 MG/ML
30 CONCENTRATE, ORAL ORAL EVERY 4 HOURS PRN
Status: DISCONTINUED | OUTPATIENT
Start: 2019-01-04 | End: 2019-01-08 | Stop reason: HOSPADM

## 2019-01-04 RX ADMIN — FUROSEMIDE 20 MG: 10 INJECTION, SOLUTION INTRAMUSCULAR; INTRAVENOUS at 11:00

## 2019-01-04 RX ADMIN — ONDANSETRON HYDROCHLORIDE 4 MG: 2 INJECTION, SOLUTION INTRAMUSCULAR; INTRAVENOUS at 20:12

## 2019-01-04 RX ADMIN — HYDROMORPHONE HYDROCHLORIDE 1 MG: 1 INJECTION, SOLUTION INTRAMUSCULAR; INTRAVENOUS; SUBCUTANEOUS at 20:12

## 2019-01-04 RX ADMIN — ESTRADIOL 0.5 G: 0.1 CREAM VAGINAL at 18:07

## 2019-01-04 RX ADMIN — METOPROLOL TARTRATE 25 MG: 25 TABLET, FILM COATED ORAL at 04:11

## 2019-01-04 RX ADMIN — HEPARIN SODIUM 3200 UNITS: 1000 INJECTION, SOLUTION INTRAVENOUS; SUBCUTANEOUS at 03:59

## 2019-01-04 RX ADMIN — HYDROMORPHONE HYDROCHLORIDE 1 MG: 1 INJECTION, SOLUTION INTRAMUSCULAR; INTRAVENOUS; SUBCUTANEOUS at 10:13

## 2019-01-04 RX ADMIN — HYDROMORPHONE HYDROCHLORIDE 1 MG: 1 INJECTION, SOLUTION INTRAMUSCULAR; INTRAVENOUS; SUBCUTANEOUS at 16:20

## 2019-01-04 RX ADMIN — PROCHLORPERAZINE EDISYLATE 10 MG: 5 INJECTION INTRAMUSCULAR; INTRAVENOUS at 04:25

## 2019-01-04 RX ADMIN — HYDROMORPHONE HYDROCHLORIDE 1 MG: 1 INJECTION, SOLUTION INTRAMUSCULAR; INTRAVENOUS; SUBCUTANEOUS at 07:35

## 2019-01-04 RX ADMIN — ONDANSETRON HYDROCHLORIDE 4 MG: 2 INJECTION, SOLUTION INTRAMUSCULAR; INTRAVENOUS at 02:41

## 2019-01-04 RX ADMIN — HYDROMORPHONE HYDROCHLORIDE 1 MG: 1 INJECTION, SOLUTION INTRAMUSCULAR; INTRAVENOUS; SUBCUTANEOUS at 01:02

## 2019-01-04 RX ADMIN — HYDROMORPHONE HYDROCHLORIDE 1 MG: 1 INJECTION, SOLUTION INTRAMUSCULAR; INTRAVENOUS; SUBCUTANEOUS at 23:54

## 2019-01-04 RX ADMIN — ONDANSETRON HYDROCHLORIDE 4 MG: 2 INJECTION, SOLUTION INTRAMUSCULAR; INTRAVENOUS at 07:35

## 2019-01-04 RX ADMIN — HYDROMORPHONE HYDROCHLORIDE 1 MG: 1 INJECTION, SOLUTION INTRAMUSCULAR; INTRAVENOUS; SUBCUTANEOUS at 04:10

## 2019-01-04 RX ADMIN — SCOPALAMINE 1 PATCH: 1 PATCH, EXTENDED RELEASE TRANSDERMAL at 04:11

## 2019-01-04 RX ADMIN — HEPARIN SODIUM 25000 UNITS: 5000 INJECTION, SOLUTION INTRAVENOUS; SUBCUTANEOUS at 03:57

## 2019-01-04 RX ADMIN — IOHEXOL 50 ML: 350 INJECTION, SOLUTION INTRAVENOUS at 08:30

## 2019-01-04 RX ADMIN — ONDANSETRON HYDROCHLORIDE 4 MG: 2 INJECTION, SOLUTION INTRAMUSCULAR; INTRAVENOUS at 13:27

## 2019-01-04 RX ADMIN — HYDROMORPHONE HYDROCHLORIDE 1 MG: 1 INJECTION, SOLUTION INTRAMUSCULAR; INTRAVENOUS; SUBCUTANEOUS at 13:17

## 2019-01-04 RX ADMIN — PROCHLORPERAZINE EDISYLATE 10 MG: 5 INJECTION INTRAMUSCULAR; INTRAVENOUS at 10:27

## 2019-01-04 ASSESSMENT — ENCOUNTER SYMPTOMS
DIARRHEA: 0
SHORTNESS OF BREATH: 0
HEARTBURN: 1
EYE PAIN: 0
COUGH: 0
VOMITING: 1
DIZZINESS: 0
PALPITATIONS: 0
FEVER: 0
CONSTIPATION: 0
NAUSEA: 1
ABDOMINAL PAIN: 1
DEPRESSION: 0
WEAKNESS: 1
NERVOUS/ANXIOUS: 1
CHILLS: 0
VOMITING: 0

## 2019-01-04 ASSESSMENT — PAIN SCALES - GENERAL
PAINLEVEL_OUTOF10: 8
PAINLEVEL_OUTOF10: 10
PAINLEVEL_OUTOF10: 9
PAINLEVEL_OUTOF10: 9
PAINLEVEL_OUTOF10: 8
PAINLEVEL_OUTOF10: 10
PAINLEVEL_OUTOF10: 8
PAINLEVEL_OUTOF10: 4
PAINLEVEL_OUTOF10: 9

## 2019-01-04 NOTE — PROGRESS NOTES
Patient's  brought 2 bottles of home oxycodone per patient's request.  1 bottle given to this RN, patient refusing to give other bottle stating  will take it home.  Charge RN notified and came to bedside to verify one home bottle.  Medication in pharmacy box, but no plastic tamper seal currently on bottle.  Pharmacy notified.  Charge RN Kathya spoke to patient and , stating if other medication is not brought home within the hour, RN must confiscate for security and safety purposes. Patient and  verbalize understanding.

## 2019-01-04 NOTE — PROGRESS NOTES
Surgical Progress Note    Author: Tereza Beard Date & Time created: 2019   11:06 AM     Interval Events:  POD#4, exploratory laparotomy, adhesiolysis, takedown of ileostomy, repair of parastomal hernia with placement of Gentrix xenograft hernia matrix, recreation of new ileostomy, ileoscopy using flexible sigmoidoscope. Pt sitting up in bed speaking with MD. She has reports nausea/vomiting but states some pureed/frozen foods will stay down. On Dysphagia 2 diet. Chronic issue, prior to surgery.  Incisional abdominal pain, right sided pain. Stool and gas in ostomy appliance. Bilateral LE swelling, now with pitting in feet and ankles. Alert and oriented. NAD. Breathing unlabored. Abdomen soft. Dressings clean, dry, intact. Will see intermittently. Dr. Cruz aware of UGI results. Continue with GI, and Medicine. Consider EGD. Call office with questions.   Review of Systems   Constitutional: Negative for chills and fever.   Respiratory: Negative for cough and shortness of breath.    Gastrointestinal: Positive for abdominal pain, heartburn, nausea and vomiting.   Skin: Negative for itching and rash.     Hemodynamics:  Temp (24hrs), Av.4 °C (97.6 °F), Min:36.3 °C (97.3 °F), Max:36.8 °C (98.2 °F)  Temperature: 36.3 °C (97.3 °F)  Pulse  Av  Min: 52  Max: 126  Blood Pressure : 131/68     Respiratory:    Respiration: 16, Pulse Oximetry: 94 %        RUL Breath Sounds: Clear, RML Breath Sounds: Clear, RLL Breath Sounds: Diminished, JACQUI Breath Sounds: Clear, LLL Breath Sounds: Diminished  Neuro:  GCS       Fluids:    Intake/Output Summary (Last 24 hours) at 19 1106  Last data filed at 19 0650   Gross per 24 hour   Intake              888 ml   Output             2600 ml   Net            -1712 ml        Current Diet Order   Procedures   • Diet NPO     Physical Exam   Constitutional: She is oriented to person, place, and time. She appears well-developed and well-nourished. No distress.   Cardiovascular: Normal  rate.    Pulmonary/Chest: Effort normal. No respiratory distress.   Abdominal: Soft. She exhibits no distension. There is tenderness. There is no rebound and no guarding.   Neurological: She is alert and oriented to person, place, and time.   Skin: Skin is warm and dry. No rash noted. She is not diaphoretic. No erythema.   Ostomy pink. Dressing clean, dry, intact.    Psychiatric: She has a normal mood and affect. Her behavior is normal.   Vitals reviewed.    Labs:  Recent Results (from the past 24 hour(s))   APTT    Collection Time: 01/04/19  2:44 AM   Result Value Ref Range    APTT 53.9 (H) 24.7 - 36.0 sec   CBC WITH DIFFERENTIAL    Collection Time: 01/04/19  2:44 AM   Result Value Ref Range    WBC 7.0 4.8 - 10.8 K/uL    RBC 3.32 (L) 4.20 - 5.40 M/uL    Hemoglobin 10.1 (L) 12.0 - 16.0 g/dL    Hematocrit 30.5 (L) 37.0 - 47.0 %    MCV 91.9 81.4 - 97.8 fL    MCH 30.4 27.0 - 33.0 pg    MCHC 33.1 (L) 33.6 - 35.0 g/dL    RDW 44.2 35.9 - 50.0 fL    Platelet Count 200 164 - 446 K/uL    MPV 10.1 9.0 - 12.9 fL    Neutrophils-Polys 62.40 44.00 - 72.00 %    Lymphocytes 25.10 22.00 - 41.00 %    Monocytes 9.30 0.00 - 13.40 %    Eosinophils 2.60 0.00 - 6.90 %    Basophils 0.30 0.00 - 1.80 %    Immature Granulocytes 0.30 0.00 - 0.90 %    Nucleated RBC 0.00 /100 WBC    Neutrophils (Absolute) 4.35 2.00 - 7.15 K/uL    Lymphs (Absolute) 1.75 1.00 - 4.80 K/uL    Monos (Absolute) 0.65 0.00 - 0.85 K/uL    Eos (Absolute) 0.18 0.00 - 0.51 K/uL    Baso (Absolute) 0.02 0.00 - 0.12 K/uL    Immature Granulocytes (abs) 0.02 0.00 - 0.11 K/uL    NRBC (Absolute) 0.00 K/uL   COMP METABOLIC PANEL    Collection Time: 01/04/19  2:44 AM   Result Value Ref Range    Sodium 142 135 - 145 mmol/L    Potassium 3.3 (L) 3.6 - 5.5 mmol/L    Chloride 106 96 - 112 mmol/L    Co2 29 20 - 33 mmol/L    Anion Gap 7.0 0.0 - 11.9    Glucose 94 65 - 99 mg/dL    Bun 4 (L) 8 - 22 mg/dL    Creatinine 0.75 0.50 - 1.40 mg/dL    Calcium 9.1 8.5 - 10.5 mg/dL    AST(SGOT) 10 (L)  12 - 45 U/L    ALT(SGPT) 10 2 - 50 U/L    Alkaline Phosphatase 68 30 - 99 U/L    Total Bilirubin 0.3 0.1 - 1.5 mg/dL    Albumin 3.1 (L) 3.2 - 4.9 g/dL    Total Protein 5.5 (L) 6.0 - 8.2 g/dL    Globulin 2.4 1.9 - 3.5 g/dL    A-G Ratio 1.3 g/dL   MAGNESIUM    Collection Time: 01/04/19  2:44 AM   Result Value Ref Range    Magnesium 1.4 (L) 1.5 - 2.5 mg/dL   ESTIMATED GFR    Collection Time: 01/04/19  2:44 AM   Result Value Ref Range    GFR If African American >60 >60 mL/min/1.73 m 2    GFR If Non African American >60 >60 mL/min/1.73 m 2     Medical Decision Making, by Problem:  Active Hospital Problems    Diagnosis   • 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[facility]) [K94.13]     Priority: High   • Crohn's disease of small intestine with complication (HCC) [K50.019]     Priority: High     Followed by GI Dr. Cruz  S/p ileostomy  Scope 5/2014-no strictures, fistulas, or new abscesses     • Pedal edema [R60.0]   • DVT (deep venous thrombosis) ([facility]) [I82.409]   • History of MRSA infection [Z86.14]   • History of infection with vancomycin resistant Enterococcus (VRE) [Z86.19]   • Intractable cyclical vomiting with nausea [G43.A1]   • Dysphasia [R47.02]   • Hypokalemia [E87.6]   • DDD (degenerative disc disease), lumbar [M51.36]   • Paroxysmal atrial fibrillation (HCC) [I48.0]   • Chronic pain [G89.29]     On multiple narcotics including high dose oxycodone and Dilaudid  Valium also on medication list     • Opioid type dependence, continuous (CMS-HCC) [F11.20]     Plan:  POD#4, exploratory laparotomy, adhesiolysis, takedown of ileostomy, repair of parastomal hernia with placement of Gentrix xenograft hernia matrix, recreation of new ileostomy, ileoscopy using flexible sigmoidoscope. Pt sitting up in bed speaking with MD. She has reports nausea/vomiting but states some pureed/frozen foods will stay down. On Dysphagia 2 diet. Chronic issue, prior to surgery.  Incisional abdominal pain, right sided pain. Stool and gas in ostomy appliance. Bilateral  LE swelling, now with pitting in feet and ankles. Alert and oriented. NAD. Breathing unlabored. Abdomen soft. Dressings clean, dry, intact. Will see intermittently. Dr. Cruz aware of UGI results. Continue with GI, and Medicine. Consider EGD. Call office with questions.    Quality Measures:  Quality-Core Measures   Reviewed items::  Labs reviewed and Medications reviewed  Sage catheter::  No Sage  DVT prophylaxis - mechanical:  SCDs  Ulcer Prophylaxis::  Yes      Discussed patient condition with Patient and Dr. Cruz

## 2019-01-04 NOTE — CARE PLAN
Problem: Nutritional:  Goal: Achieve adequate nutritional intake  Patient will consume >50% of meals   Outcome: PROGRESSING SLOWER THAN EXPECTED  Pt is currently NPO x1 day. Pt was previously on low fiber, dysphagia 2 diet with nectar thick liquids and pureed fruits and vegetables and receiving magic cups TID as snacks. Per chart pt PO < % and % of supplements. Resume diet when medically feasible. Please document intake of all meals as % taken in ADL's to provide interdisciplinary communication across all shifts. RD will continue to follow.

## 2019-01-04 NOTE — THERAPY
EDUCATION: Patient was seen for education only. Patient frustrated with overall hospital stay and wants to know when decisions will be made in how to help her swallowing and keeping foods down. Ongoing support provided. Patient had an upper GI study and findings revealed dye not moving past esophagus. No PO trials were given for dysphagia tx this date 2/2 to this. Patient is at high risk for ascending aspiration given results of upper GI study. Patient stated she continues to have difficulties with D2/NTL diet with both purees and NTL and can only keep down small amounts. At this time recommend patient continue NPO with defer to MD. Per notes GI has been consulted. SLP to continue to follow. Patient may benefit from an VFSS when medically cleared for PO intake.

## 2019-01-04 NOTE — CARE PLAN
Problem: Pain Management  Goal: Pain level will decrease to patient's comfort goal  Outcome: PROGRESSING SLOWER THAN EXPECTED  Patient complains of constant severe pain. Pt receiving pain medication every three hours.     Problem: Psychosocial Needs:  Goal: Level of anxiety will decrease  Outcome: PROGRESSING SLOWER THAN EXPECTED  Patient is very irritable and anxious about receiving pain medications.

## 2019-01-04 NOTE — PROGRESS NOTES
Assumed care of patient at 1900  A&O x4, pt appears anxious and agitated.   RUE with heparin infusing at 950 units/hr.  LUE midline, TKO.    Up with SB assistance with FWW, steady gait.  + nausea, - emesis.  Medicated per MAR.   Complains of severe pain, Medications reviewed with patient. Pt informed of time dilaudid is available.   Refusing PO pain medications at this time.  Pt requesting nausea medications be given as soon as they are available.    Midline abdominal incision with dressing, CDI.   3+ BLE pitting edema.    Ostomy with + output.   Voiding without difficulty  Call light and personal belongings within reach. Frequent rounding in place. No additional needs at this time.

## 2019-01-04 NOTE — PROGRESS NOTES
Pt requested to take PO metoprolol dose that she earlier refused. Pt given dose at 2046. Pt did not experience any episodes of emesis.

## 2019-01-04 NOTE — PROGRESS NOTES
Telephone conversation with Dr. Coleman regarding upper GI test. MD was not able to complete the procedure as dye does not go past the esophagus.  This RN will pass on to Dr. Palmer.  .

## 2019-01-04 NOTE — PROGRESS NOTES
This RN paged CORWIN Rm regarding patient request to speak to Dr. Cruz regarding pain control and pain medications currently on MAR.  Lilly Johnson also refusing to increase patient's IV pain medications since patient is POD day#6, and MD's are attempting to transition patient back to home regimen.  Patient notified of convo and became verbally aggressive and agitated towards RN.  RN exited room.

## 2019-01-04 NOTE — PROGRESS NOTES
Hospital Medicine Daily Progress Note    Date of Service  1/3/2019    Chief Complaint  Abdominal pain    Hospital Course    63 y.o. female with PMH Crohn's with previous colectomy with ileostomy admitted 12/28/2018 with generalized abdominal pain, nausea decreased output from her ileostomy.  Recently hospitalized at [facility] and evaluated by Dr. Nunez.  Patient was evaluated by surgery.  Patient underwent surgery ostomy revision and takedown of a new ostomy creation 12/29.  Patient tolerated procedure very well.      Interval Problem Update  AB surgery-pain continues to be quite severe. Patient does not want to use the oral oxycodone solution on formulary here since it causes some adverse reaction and she is allergic to transdermal fentanyl patches. Pain is all around her belly and swallowing issues continue with SLP re-evaluating the patient today. Ostomy output remains ok and is non bloody at this time.    DVT-tolerating heparin gtt with no overt bleeding issues at this time. PTT remains mostly therapeutic at this time.       Consultants/Specialty  -General surgery    Code Status  Full    Disposition  TBD    Review of Systems  Review of Systems   Constitutional: Negative for fever.   HENT: Negative for tinnitus.    Eyes: Negative for pain.   Respiratory: Negative for cough.    Cardiovascular: Positive for leg swelling (no change around her feet today). Negative for chest pain.   Gastrointestinal: Positive for abdominal pain (better controlled today) and nausea. Negative for constipation, diarrhea and vomiting.        Swallowing issues continue   Genitourinary: Negative for dysuria.   Skin: Negative for itching.   Neurological: Positive for weakness (unchanged today). Negative for dizziness.   Psychiatric/Behavioral: Negative for depression. The patient is nervous/anxious (very anxious).         Very upset today   All other systems reviewed and are negative.       Physical Exam  Temp:  [36.4 °C (97.5 °F)-36.9  °C (98.4 °F)] 36.4 °C (97.5 °F)  Pulse:  [] 108  Resp:  [18-20] 20  BP: (131-164)/(54-97) 164/97    Physical Exam   Constitutional: She is oriented to person, place, and time. Vital signs are normal. She appears well-developed. She is cooperative.   HENT:   Head: Normocephalic.   Right Ear: Hearing normal.   Left Ear: Hearing normal.   Nose: Nose normal.   Mouth/Throat: Oropharynx is clear and moist and mucous membranes are normal. No oropharyngeal exudate.   Eyes: Pupils are equal, round, and reactive to light. Conjunctivae and EOM are normal. No scleral icterus.   Neck: Normal range of motion and phonation normal. Neck supple.   Cardiovascular: Regular rhythm, normal heart sounds and intact distal pulses.  Tachycardia present.  Exam reveals no gallop.    No edema  Cap refill WNL   Pulmonary/Chest: Effort normal and breath sounds normal. No stridor. She has no wheezes. She has no rhonchi. She has no rales.   Abdominal: Soft. Normal appearance. Bowel sounds are decreased. There is generalized tenderness.   Ileostomy with liquid, orange color output, no leakage noted-unchanged today  Stoma appears pink and healthy-no interval change noted today  Midline abdominal vertical incision with staples in place, appears C/D/I, moderate TTP somewhat diffusely   Musculoskeletal: Normal range of motion. She exhibits edema (pedal B/L, warm, ). She exhibits no tenderness.   Neurological: She is alert and oriented to person, place, and time. She has normal strength.   Skin: Skin is warm and dry. She is not diaphoretic. No pallor.   Psychiatric: Her mood appears not anxious. Her speech is not rapid and/or pressured.   Very anxious and crying today and quite upset   Nursing note and vitals reviewed.      Fluids    Intake/Output Summary (Last 24 hours) at 01/03/19 1652  Last data filed at 01/03/19 1410   Gross per 24 hour   Intake              447 ml   Output             4625 ml   Net            -4178 ml       Laboratory  Recent  Labs      01/01/19   0024  01/02/19   0424   WBC  7.9  5.7   RBC  3.58*  3.24*   HEMOGLOBIN  10.8*  9.8*   HEMATOCRIT  34.8*  30.1*   MCV  97.2  92.9   MCH  30.2  30.2   MCHC  31.0*  32.6*   RDW  48.4  45.1   PLATELETCT  211  164   MPV  10.6  10.4     Recent Labs      01/01/19   0024  01/02/19   0424  01/03/19   0235   SODIUM  139  140  139   POTASSIUM  4.0  3.5*  3.5*   CHLORIDE  108  111  107   CO2  24  24  27   GLUCOSE  88  98  91   BUN  7*  5*  4*   CREATININE  0.68  0.59  0.77   CALCIUM  9.3  9.2  9.0     Recent Labs      01/02/19   1142  01/02/19 2000 01/03/19   0235   APTT  45.6*  86.1*  73.1*               Imaging  US-EXTREMITY VENOUS LOWER BILAT   Final Result      IR-US GUIDED PIV   Final Result    Ultrasound-guided PERIPHERAL IV INSERTION performed by    qualified nursing staff as above.            IR-MIDLINE CATHETER INSERTION >5 YRS    (Results Pending)   DX-UPPER GI-SERIES WITH KUB    (Results Pending)        Assessment/Plan  * Intractable cyclical vomiting with nausea- (present on admission)   Assessment & Plan    -nausea remains an ongoing issues, continue conservative management with IV anti-emetics PRN  -Tolerated surgery very well.  -IV fluid hydration with KCl  -added IV compazine PRN yesterday       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HCC)- (present on admission)   Assessment & Plan    Likely to be the reason for nausea vomiting.  Status post surgery 12/30, POD#6  Tolerated procedure very well.  Continue monitor by surgery.  -good output now, non bloody, pain is an issues as noted above  -oral dysphagia issues remain  -UPPER GI series given pain control issues, though ostomy appears to be working  -[facility] tomorrow as well, continue modified diet     Crohn's disease of small intestine with complication (HCC)- (present on admission)   Assessment & Plan    -Followed as outpatient by Dr. Cruz  -Not currently in acute exacerbation       Pedal edema   Assessment & Plan    2/2 surgery and volume resuscitation  -IV  lasix 20 mg daily, mechanically elevate legs     DVT (deep venous thrombosis) (HCC)   Assessment & Plan    Patient does have a history of DVT.  Ultrasound today showing newly developed a DVT, likely provoked by recent surgery  -continue weight based heparin gtt, tolerating well, goal PTT 60-90, check PTT Q6 hours, adjust PRN  -she refuses oral AC, will need to transition to sq lovenox on discharge, no bleeding issues at this time (no adjustments needed at this time)  -likely total time period for AC will be 3-6 months     History of infection with vancomycin resistant Enterococcus (VRE)- (present on admission)   Assessment & Plan    -remote history of     History of MRSA infection- (present on admission)   Assessment & Plan    -Per chart review (+) MRSA sergio 2014     Dysphasia- (present on admission)   Assessment & Plan    -History of  -Followed by GI as outpatient  -Aspiration precautions  -SLP eval-see above     Hypokalemia- (present on admission)   Assessment & Plan    -resolved, replace PRN     DDD (degenerative disc disease), lumbar- (present on admission)   Assessment & Plan    -Chronic pain management     Paroxysmal atrial fibrillation (HCC)- (present on admission)   Assessment & Plan    -Not currently on chronic anticoagulation secondary to Crohn's  -Continue home metoprolol  -Baby ASA, hold while on heparin  -no current issues       Opioid type dependence, continuous (CMS-HCC)- (present on admission)   Assessment & Plan    -Continue home oxycodone, though patient refuses oral formulation solution given prior intolerances  -have  bring in bottle unopened and have verified by pharmacy?  -concern about opioid addiction, continue IV dilaudid at current parameters, but given POD#6, no advancements planned at this time     Chronic pain- (present on admission)   Assessment & Plan    -On chronic opioid management -Dr. Telles  -Bowel protocol            VTE prophylaxis: heparin gtt                Félix SAMS  OLIVIA Palmer.

## 2019-01-04 NOTE — PROGRESS NOTES
Tamper seal IS in place on bottle received from .  NOC RN to delivery to pharmacy and verify for MAR.

## 2019-01-04 NOTE — PROGRESS NOTES
Patient refuses AM lasix at this time because she states she does not want to have to void during her procedure this morning. Will inform day RN to follow up.

## 2019-01-05 PROBLEM — K94.13: Chronic | Status: ACTIVE | Noted: 2018-12-28

## 2019-01-05 LAB
ANION GAP SERPL CALC-SCNC: 10 MMOL/L (ref 0–11.9)
APTT PPP: 55.2 SEC (ref 24.7–36)
APTT PPP: 75.8 SEC (ref 24.7–36)
BASOPHILS # BLD AUTO: 0.4 % (ref 0–1.8)
BASOPHILS # BLD: 0.04 K/UL (ref 0–0.12)
BUN SERPL-MCNC: 6 MG/DL (ref 8–22)
CALCIUM SERPL-MCNC: 9.7 MG/DL (ref 8.5–10.5)
CHLORIDE SERPL-SCNC: 101 MMOL/L (ref 96–112)
CO2 SERPL-SCNC: 25 MMOL/L (ref 20–33)
CREAT SERPL-MCNC: 0.99 MG/DL (ref 0.5–1.4)
EOSINOPHIL # BLD AUTO: 0.22 K/UL (ref 0–0.51)
EOSINOPHIL NFR BLD: 2 % (ref 0–6.9)
ERYTHROCYTE [DISTWIDTH] IN BLOOD BY AUTOMATED COUNT: 44.8 FL (ref 35.9–50)
FOLATE SERPL-MCNC: >23.4 NG/ML
GLUCOSE SERPL-MCNC: 101 MG/DL (ref 65–99)
HCT VFR BLD AUTO: 32.9 % (ref 37–47)
HGB BLD-MCNC: 10.9 G/DL (ref 12–16)
IMM GRANULOCYTES # BLD AUTO: 0.05 K/UL (ref 0–0.11)
IMM GRANULOCYTES NFR BLD AUTO: 0.4 % (ref 0–0.9)
LYMPHOCYTES # BLD AUTO: 2.09 K/UL (ref 1–4.8)
LYMPHOCYTES NFR BLD: 18.6 % (ref 22–41)
MAGNESIUM SERPL-MCNC: 1.3 MG/DL (ref 1.5–2.5)
MCH RBC QN AUTO: 30.3 PG (ref 27–33)
MCHC RBC AUTO-ENTMCNC: 33.1 G/DL (ref 33.6–35)
MCV RBC AUTO: 91.4 FL (ref 81.4–97.8)
MONOCYTES # BLD AUTO: 0.92 K/UL (ref 0–0.85)
MONOCYTES NFR BLD AUTO: 8.2 % (ref 0–13.4)
NEUTROPHILS # BLD AUTO: 7.9 K/UL (ref 2–7.15)
NEUTROPHILS NFR BLD: 70.4 % (ref 44–72)
NRBC # BLD AUTO: 0 K/UL
NRBC BLD-RTO: 0 /100 WBC
PLATELET # BLD AUTO: 238 K/UL (ref 164–446)
PMV BLD AUTO: 10.2 FL (ref 9–12.9)
POTASSIUM SERPL-SCNC: 3.3 MMOL/L (ref 3.6–5.5)
RBC # BLD AUTO: 3.6 M/UL (ref 4.2–5.4)
SODIUM SERPL-SCNC: 136 MMOL/L (ref 135–145)
VIT B12 SERPL-MCNC: 429 PG/ML (ref 211–911)
WBC # BLD AUTO: 11.2 K/UL (ref 4.8–10.8)

## 2019-01-05 PROCEDURE — 80048 BASIC METABOLIC PNL TOTAL CA: CPT

## 2019-01-05 PROCEDURE — 84630 ASSAY OF ZINC: CPT

## 2019-01-05 PROCEDURE — 82746 ASSAY OF FOLIC ACID SERUM: CPT

## 2019-01-05 PROCEDURE — 99233 SBSQ HOSP IP/OBS HIGH 50: CPT | Performed by: INTERNAL MEDICINE

## 2019-01-05 PROCEDURE — 85730 THROMBOPLASTIN TIME PARTIAL: CPT | Mod: 91

## 2019-01-05 PROCEDURE — 665998 HH PPS REVENUE CREDIT

## 2019-01-05 PROCEDURE — 700111 HCHG RX REV CODE 636 W/ 250 OVERRIDE (IP): Performed by: INTERNAL MEDICINE

## 2019-01-05 PROCEDURE — 700101 HCHG RX REV CODE 250: Performed by: NURSE PRACTITIONER

## 2019-01-05 PROCEDURE — 36415 COLL VENOUS BLD VENIPUNCTURE: CPT

## 2019-01-05 PROCEDURE — 700111 HCHG RX REV CODE 636 W/ 250 OVERRIDE (IP): Performed by: HOSPITALIST

## 2019-01-05 PROCEDURE — 92526 ORAL FUNCTION THERAPY: CPT

## 2019-01-05 PROCEDURE — 665999 HH PPS REVENUE DEBIT

## 2019-01-05 PROCEDURE — 770006 HCHG ROOM/CARE - MED/SURG/GYN SEMI*

## 2019-01-05 PROCEDURE — 83735 ASSAY OF MAGNESIUM: CPT

## 2019-01-05 PROCEDURE — 82607 VITAMIN B-12: CPT

## 2019-01-05 PROCEDURE — 302085 CLAMP OSTOMY: Performed by: INTERNAL MEDICINE

## 2019-01-05 PROCEDURE — 700111 HCHG RX REV CODE 636 W/ 250 OVERRIDE (IP): Performed by: NURSE PRACTITIONER

## 2019-01-05 PROCEDURE — 85025 COMPLETE CBC W/AUTO DIFF WBC: CPT

## 2019-01-05 RX ORDER — SODIUM CHLORIDE AND POTASSIUM CHLORIDE 300; 900 MG/100ML; MG/100ML
INJECTION, SOLUTION INTRAVENOUS ONCE
Status: COMPLETED | OUTPATIENT
Start: 2019-01-05 | End: 2019-01-06

## 2019-01-05 RX ORDER — MAGNESIUM SULFATE HEPTAHYDRATE 40 MG/ML
2 INJECTION, SOLUTION INTRAVENOUS ONCE
Status: COMPLETED | OUTPATIENT
Start: 2019-01-05 | End: 2019-01-05

## 2019-01-05 RX ORDER — KETOROLAC TROMETHAMINE 30 MG/ML
30 INJECTION, SOLUTION INTRAMUSCULAR; INTRAVENOUS EVERY 6 HOURS PRN
Status: DISCONTINUED | OUTPATIENT
Start: 2019-01-05 | End: 2019-01-08 | Stop reason: HOSPADM

## 2019-01-05 RX ADMIN — POTASSIUM CHLORIDE AND SODIUM CHLORIDE: 900; 300 INJECTION, SOLUTION INTRAVENOUS at 10:55

## 2019-01-05 RX ADMIN — ONDANSETRON HYDROCHLORIDE 4 MG: 2 INJECTION, SOLUTION INTRAMUSCULAR; INTRAVENOUS at 07:49

## 2019-01-05 RX ADMIN — PROCHLORPERAZINE EDISYLATE 10 MG: 5 INJECTION INTRAMUSCULAR; INTRAVENOUS at 17:45

## 2019-01-05 RX ADMIN — ONDANSETRON HYDROCHLORIDE 4 MG: 2 INJECTION, SOLUTION INTRAMUSCULAR; INTRAVENOUS at 14:21

## 2019-01-05 RX ADMIN — HYDROMORPHONE HYDROCHLORIDE 1 MG: 1 INJECTION, SOLUTION INTRAMUSCULAR; INTRAVENOUS; SUBCUTANEOUS at 07:49

## 2019-01-05 RX ADMIN — PROCHLORPERAZINE EDISYLATE 10 MG: 5 INJECTION INTRAMUSCULAR; INTRAVENOUS at 00:05

## 2019-01-05 RX ADMIN — HYDROMORPHONE HYDROCHLORIDE 1 MG: 1 INJECTION, SOLUTION INTRAMUSCULAR; INTRAVENOUS; SUBCUTANEOUS at 20:44

## 2019-01-05 RX ADMIN — MAGNESIUM SULFATE 2 G: 2 INJECTION INTRAVENOUS at 10:55

## 2019-01-05 RX ADMIN — PROCHLORPERAZINE EDISYLATE 10 MG: 5 INJECTION INTRAMUSCULAR; INTRAVENOUS at 10:55

## 2019-01-05 RX ADMIN — ONDANSETRON HYDROCHLORIDE 4 MG: 2 INJECTION, SOLUTION INTRAMUSCULAR; INTRAVENOUS at 20:44

## 2019-01-05 RX ADMIN — HYDROMORPHONE HYDROCHLORIDE 1 MG: 1 INJECTION, SOLUTION INTRAMUSCULAR; INTRAVENOUS; SUBCUTANEOUS at 10:55

## 2019-01-05 RX ADMIN — HEPARIN SODIUM 950 UNITS/HR: 5000 INJECTION, SOLUTION INTRAVENOUS; SUBCUTANEOUS at 06:39

## 2019-01-05 RX ADMIN — HYDROMORPHONE HYDROCHLORIDE 1 MG: 1 INJECTION, SOLUTION INTRAMUSCULAR; INTRAVENOUS; SUBCUTANEOUS at 17:45

## 2019-01-05 RX ADMIN — HYDROMORPHONE HYDROCHLORIDE 1 MG: 1 INJECTION, SOLUTION INTRAMUSCULAR; INTRAVENOUS; SUBCUTANEOUS at 14:21

## 2019-01-05 ASSESSMENT — ENCOUNTER SYMPTOMS
BLURRED VISION: 0
ABDOMINAL PAIN: 1
SPEECH CHANGE: 0
VOMITING: 0
NERVOUS/ANXIOUS: 1
TINGLING: 0
CHILLS: 0
DIARRHEA: 0
CARDIOVASCULAR NEGATIVE: 1
FEVER: 0
HEADACHES: 0
CONSTIPATION: 0
PALPITATIONS: 0
SHORTNESS OF BREATH: 0
DIZZINESS: 0
NAUSEA: 1
WEAKNESS: 1
DOUBLE VISION: 0
MYALGIAS: 1
SENSORY CHANGE: 0
TREMORS: 0
RESPIRATORY NEGATIVE: 1
FOCAL WEAKNESS: 0
CONSTITUTIONAL NEGATIVE: 1
BLOOD IN STOOL: 0
COUGH: 0

## 2019-01-05 ASSESSMENT — PAIN SCALES - GENERAL
PAINLEVEL_OUTOF10: 6
PAINLEVEL_OUTOF10: 8
PAINLEVEL_OUTOF10: 9
PAINLEVEL_OUTOF10: 8
PAINLEVEL_OUTOF10: 9
PAINLEVEL_OUTOF10: 9
PAINLEVEL_OUTOF10: 8

## 2019-01-05 NOTE — CARE PLAN
Problem: Bowel/Gastric:  Goal: Normal bowel function is maintained or improved  Outcome: PROGRESSING AS EXPECTED  Ostomy is functioning. +flatus, +bowel sounds. Abdomen soft, tender, non distended.  Difficulty taking PO due to dysphagia, pt to go for an EGD tomorrow.      Problem: Pain Management  Goal: Pain level will decrease to patient's comfort goal  Outcome: PROGRESSING AS EXPECTED  Pain controlled with IV dilaudid. Encouraged PO medications. Non-pharmacologic interventions such as heat packs used for breakthrough.

## 2019-01-05 NOTE — PROGRESS NOTES
Gastroenterology Progress Note     Author: Daniel Daniels   Date & Time Created: 1/5/2019 10:00 AM    Chief Complaint:  Trouble swallowing.     Interval History:  Ba swallow shows spastic esophagus. Contrast extremely slow to get through. Scheduled for EGD in am with anesthesia.     Review of Systems:  Review of Systems   Constitutional: Negative.    Respiratory: Negative.    Cardiovascular: Negative.    Gastrointestinal: Positive for abdominal pain and nausea.       Physical Exam:  Physical Exam   Constitutional: She is oriented to person, place, and time. She appears well-developed and well-nourished.   Cardiovascular: Normal rate and regular rhythm.    Pulmonary/Chest: Effort normal and breath sounds normal.   Abdominal: Soft. Bowel sounds are normal. She exhibits no distension. There is tenderness. There is guarding. There is no rebound.   Ileostomy LUQ. Diffuse postop tenderness.    Musculoskeletal: She exhibits no edema.   Neurological: She is alert and oriented to person, place, and time.   Skin: Skin is warm and dry.   Psychiatric: She has a normal mood and affect.       Labs:          Recent Labs      01/03/19 0235 01/04/19 0244 01/05/19   0003   SODIUM  139  142  136   POTASSIUM  3.5*  3.3*  3.3*   CHLORIDE  107  106  101   CO2  27  29  25   BUN  4*  4*  6*   CREATININE  0.77  0.75  0.99   MAGNESIUM  1.4*  1.4*  1.3*   CALCIUM  9.0  9.1  9.7     Recent Labs      01/03/19 0235 01/04/19 0244 01/05/19   0003   ALTSGPT   --   10   --    ASTSGOT   --   10*   --    ALKPHOSPHAT   --   68   --    TBILIRUBIN   --   0.3   --    GLUCOSE  91  94  101*     Recent Labs      01/04/19 0244 01/04/19   1755  01/05/19   0003  01/05/19   0334   RBC  3.32*   --    --   3.60*   --    HEMOGLOBIN  10.1*   --    --   10.9*   --    HEMATOCRIT  30.5*   --    --   32.9*   --    PLATELETCT  200   --    --   238   --    APTT  53.9*   < >  58.2*  55.2*  75.8*    < > = values in this interval not displayed.     Recent  Labs      19   0244  19   0003   WBC  7.0  11.2*   NEUTSPOLYS  62.40  70.40   LYMPHOCYTES  25.10  18.60*   MONOCYTES  9.30  8.20   EOSINOPHILS  2.60  2.00   BASOPHILS  0.30  0.40   ASTSGOT  10*   --    ALTSGPT  10   --    ALKPHOSPHAT  68   --    TBILIRUBIN  0.3   --      Hemodynamics:  Temp (24hrs), Av.7 °C (98.1 °F), Min:36.3 °C (97.3 °F), Max:37.3 °C (99.1 °F)  Temperature: 36.7 °C (98 °F)  Pulse  Av.1  Min: 52  Max: 126  Blood Pressure : 125/67     Respiratory:    Respiration: 16, Pulse Oximetry: 93 %        RUL Breath Sounds: Clear, RML Breath Sounds: Clear, RLL Breath Sounds: Diminished, JACQUI Breath Sounds: Clear, LLL Breath Sounds: Diminished  Fluids:    Intake/Output Summary (Last 24 hours) at 19 1000  Last data filed at 19 2354   Gross per 24 hour   Intake                0 ml   Output             2150 ml   Net            -2150 ml        GI/Nutrition:  Orders Placed This Encounter   Procedures   • Diet Order Low Fiber(GI Soft)     Standing Status:   Standing     Number of Occurrences:   1     Order Specific Question:   Diet:     Answer:   Low Fiber(GI Soft) [2]     Order Specific Question:   Texture/Fiber modifications:     Answer:   Dysphagia 2(Pureed/Chopped)specify fluid consistency(question 6) [2]   • Diet NPO     Standing Status:   Standing     Number of Occurrences:   8     Order Specific Question:   Restrict to:     Answer:   Strict [1]     Comments:   In anticipation of EGD 19 0800   • Diet NPO     Standing Status:   Standing     Number of Occurrences:   8     Order Specific Question:   Restrict to:     Answer:   Sips with Medications [3]     Medical Decision Making, by Problem:  Active Hospital Problems    Diagnosis   • *Intractable cyclical vomiting with nausea [G43.A1]   • 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[facility]) [K94.13]   • Crohn's disease of small intestine with complication (HCC) [K50.019]   • Pedal edema [R60.0]   • DVT (deep venous thrombosis) ([facility]) [I82.409]   • History  of MRSA infection [Z86.14]   • History of infection with vancomycin resistant Enterococcus (VRE) [Z86.19]   • Dysphasia [R47.02]   • Hypokalemia [E87.6]   • DDD (degenerative disc disease), lumbar [M51.36]   • Paroxysmal atrial fibrillation (HCC) [I48.0]   • Chronic pain [G89.29]   • Opioid type dependence, continuous (CMS-HCC) [F11.20]   Impression:  1. Dysphagia.  2. Crohn's disease  3. Hx DVT on heparin drip.     Plan:  1. EGD in am 0800 with anesthesia.  2. Hold heparin at 0200.     Quality-Core Measures   Reviewed items::  Labs reviewed, Medications reviewed and Radiology images reviewed

## 2019-01-05 NOTE — PROGRESS NOTES
"Pt A&Ox4, Pt appears to be resting comfortably and sleeping when called to room, awakened easily to voice. Demanding IV pain medications, agitated, stating \"what don't you understand, I cannot eat anything by mouth and I will not take liquid oxy you have to give me dilaudid. I can only eat magic cups and that is it\". This RN educated pt that oxy can be mixed with a bite of magic cup or take thickened as pt has dys 2 diet ordered. Pt continues to adamantly refuse any PO medications.  Midline incision, dressing in place CDI.   Pt c/o occasional n/v. + bowel sounds, + flatus, LBM this AM in ostomy 1/5, watery.  BLE swelling. Heparin gtt in use.   Lung sounds clear to diminished. Saturating >90% on RA.  Pt ambulates SBA steady gait.  Updated on plan of care. Safety education provided. Bed locked in low. Call light within reach. Rounding in place.   "

## 2019-01-05 NOTE — PROGRESS NOTES
[facility] Medicine Daily Progress Note    Date of Service  1/5/2019    Chief Complaint  Abdominal pain    Hospital Course    63 y.o. female with PMH Crohn's with previous colectomy with ileostomy admitted 12/28/2018 with generalized abdominal pain, nausea decreased output from her ileostomy.  Recently hospitalized at [facility] and evaluated by Dr. Nunez.  Patient was evaluated by surgery.  Patient underwent surgery ostomy revision and takedown of a new ostomy creation 12/29.  Patient tolerated procedure very well.  Has ongoing dysphagia.  Pending EGD.      Interval Problem Update  1/5:  Continues to be very anxious, constantly requesting more IV pain medications.  Clinical status does not correlate with requests for increased pain medications.  Also complains of ongoing nausea.  VSS.      Consultants/Specialty  -General surgery  -GI    Code Status  Full    Disposition  TBD    Review of Systems  Review of Systems   Constitutional: Negative for chills and fever.        No interval improvement   HENT: Negative for congestion and hearing loss.    Eyes: Negative for blurred vision and double vision.   Respiratory: Negative for cough and shortness of breath.    Cardiovascular: Positive for leg swelling (no change around her feet today). Negative for chest pain and palpitations.   Gastrointestinal: Positive for abdominal pain (better controlled today) and nausea. Negative for blood in stool, constipation, diarrhea and vomiting.   Genitourinary: Negative for dysuria.   Musculoskeletal: Positive for myalgias.   Skin: Negative for itching and rash.   Neurological: Positive for weakness. Negative for dizziness, tingling, tremors, sensory change, speech change, focal weakness and headaches.   Psychiatric/Behavioral: The patient is nervous/anxious (very anxious).         Slightly less upset today        Physical Exam  Temp:  [36.5 °C (97.7 °F)-37.3 °C (99.1 °F)] 36.7 °C (98 °F)  Pulse:  [] 91  Resp:  [16-20] 16  BP:  (115-152)/(61-76) 125/67    Physical Exam   Constitutional: She is oriented to person, place, and time. Vital signs are normal. She appears well-developed. She is cooperative. No distress.   HENT:   Head: Normocephalic and atraumatic.   Right Ear: Hearing normal.   Left Ear: Hearing normal.   Mouth/Throat: Mucous membranes are normal.   Eyes: Pupils are equal, round, and reactive to light. Conjunctivae are normal. Right eye exhibits no discharge. Left eye exhibits no discharge.   Neck: Normal range of motion and phonation normal. Neck supple. No JVD present. No tracheal deviation present.   Cardiovascular: Regular rhythm, normal heart sounds and intact distal pulses.  Tachycardia present.    No murmur heard.  Pulmonary/Chest: Effort normal and breath sounds normal. No stridor. No respiratory distress. She has no wheezes. She has no rhonchi. She has no rales.   Abdominal: Soft. Normal appearance and bowel sounds are normal. She exhibits no distension. There is tenderness. There is no rebound.   Ileostomy   Stoma remains pink  Midline abdominal vertical incision with staples in place, appears C/D/I,     Musculoskeletal: Normal range of motion. She exhibits edema (pedal B/L, warm, ).   BLE edema.     Neurological: She is alert and oriented to person, place, and time. She has normal strength. No cranial nerve deficit.   Skin: Skin is warm and dry. She is not diaphoretic. No erythema.   Psychiatric: Her mood appears anxious.   Nursing note and vitals reviewed.      Fluids    Intake/Output Summary (Last 24 hours) at 01/05/19 1517  Last data filed at 01/05/19 1400   Gross per 24 hour   Intake                0 ml   Output             1100 ml   Net            -1100 ml       Laboratory  Recent Labs      01/04/19   0244  01/05/19   0003   WBC  7.0  11.2*   RBC  3.32*  3.60*   HEMOGLOBIN  10.1*  10.9*   HEMATOCRIT  30.5*  32.9*   MCV  91.9  91.4   MCH  30.4  30.3   MCHC  33.1*  33.1*   RDW  44.2  44.8   PLATELETCT  200  238    MPV  10.1  10.2     Recent Labs      01/03/19   0235  01/04/19   0244  01/05/19   0003   SODIUM  139  142  136   POTASSIUM  3.5*  3.3*  3.3*   CHLORIDE  107  106  101   CO2  27  29  25   GLUCOSE  91  94  101*   BUN  4*  4*  6*   CREATININE  0.77  0.75  0.99   CALCIUM  9.0  9.1  9.7     Recent Labs      01/04/19   1755  01/05/19   0003  01/05/19   0334   APTT  58.2*  55.2*  75.8*               Imaging  US-EXTREMITY VENOUS LOWER BILAT   Final Result      IR-US GUIDED PIV   Final Result    Ultrasound-guided PERIPHERAL IV INSERTION performed by    qualified nursing staff as above.            IR-MIDLINE CATHETER INSERTION >5 YRS    (Results Pending)   DX-UPPER GI-SERIES WITH KUB    (Results Pending)        Assessment/Plan  * Intractable cyclical vomiting with nausea- (present on admission)   Assessment & Plan    -nausea remains an ongoing issues, continue conservative management with IV anti-emetics PRN  -Tolerated surgery very well.  -IV fluid hydration with KCl         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HCC)- (present on admission)   Assessment & Plan    Status post surgery 12/30, POD#7  Tolerated procedure very well.  Continue monitor by surgery.  -good output now, non bloody  -oral dysphagia issues remain  -UPPER GI series given pain control issues and see note below, though ostomy appears to be working appropriately.  -Continue pain control.     Crohn's disease of small intestine with complication (HCC)- (present on admission)   Assessment & Plan    -Followed as outpatient by Dr. Cruz  -Not currently in acute exacerbation       DVT (deep venous thrombosis) ([facility])   Assessment & Plan    Patient does have a history of DVT.  Ultrasound today showing newly developed a DVT, likely provoked by recent surgery  -continue weight based heparin gtt, tolerating well, goal PTT 60-90, check PTT Q6 hours, adjust PRN  -she refuses oral AC secondary to dysphagia.    -Re-evaluate after EGD.  -likely total time period for AC will be 3-6  months  -Hold for EGD.     Pedal edema   Assessment & Plan    2/2 surgery and volume resuscitation, slowly improving  -Hold lasix due to hypokalemia and poor oral intake.     History of infection with vancomycin resistant Enterococcus (VRE)- (present on admission)   Assessment & Plan    -remote history of     History of MRSA infection- (present on admission)   Assessment & Plan    -Per chart review (+) MRSA sergio 2014     Dysphasia- (present on admission)   Assessment & Plan    -History of  -Followed by GI as outpatient  -Aspiration precautions  -UPPER GI with almost complete blockage of contrast material at the GE junction  -?achalasia, tumor, stricture, extension of Crohns?  -last EGD in 2011 was normal  -Anticipate EGD tomorrow.  -Hold heparin at 0200.  -GI following.     Hypokalemia- (present on admission)   Assessment & Plan    Likely due to poor oral intake.  Replace.  Hold lasix temporarily.  Monitor bmp.     DDD (degenerative disc disease), lumbar- (present on admission)   Assessment & Plan    -Chronic pain management     Paroxysmal atrial fibrillation (HCC)- (present on admission)   Assessment & Plan    -Not currently on chronic anticoagulation secondary to Crohn's  -Continue home metoprolol  -Baby ASA, Heparin.  -no current issues       Opioid type dependence, continuous (CMS-HCC)- (present on admission)   Assessment & Plan    -Continue home oxycodone, though patient refuses oral formulation solution given prior intolerances  -have  bring in bottle unopened and have verified by pharmacy?  -concern about opioid addiction, continue IV dilaudid at current parameters  -Exhibits drug-seeking behaviors.  -No further increase of narcotics.     Chronic pain- (present on admission)   Assessment & Plan    -On chronic opioid management -Dr. Telles  -Bowel protocol            VTE prophylaxis: heparin gtt                MARCO Lynch

## 2019-01-05 NOTE — PROGRESS NOTES
Bedside report received.  Assessment complete.  A&O x 4. Patient calls appropriately.  Patient up with SB assist.    Patient has 8/10 pain. Medication given (see MAR)  Complains of nausea with no vomiting. Tolerating dysphagia 2 thick liquid diet.  Midline incision with dressing, CDI. LLQ ostomy, CDI and patent.   + void, + flatus, + BM via ostomy.  Patient denies SOB.  SCD's off.    Review plan with of care with patient. Call light and personal belongings with in reach. Hourly rounding in place. All needs met at this time.

## 2019-01-05 NOTE — WOUND TEAM
"[facility]  Inpatient Services  New Ostomy Management & Teaching     HPI:  Reviewed  PMH: Reviewed   SH: Reviewed     Subjective: reports having difficulty closing end of pouch. Uses Coloplast at home.     Objective:                   Ostomy type:  ileostomy              Stoma location: LLQ              Stoma assessment:                          Appearance: Red, edematous                          Size:  1 1/2\"                          Protrusion: Well budded                          Output: small loose brown                          MC jxn   intact                          Peristomal skin:  intact                 Ostomy Appliance (type and size):  2 3/4\" two piece with paste ring     Interventions:assisted patient to bathroom, where she sat on commode. Removed appliance which also removed the midline incision dressing. cleaned skin and stoma with warm washcloth. Used cardboard guide to measure stoma. Cut stoma to 1 1/2\" and placed flat paste ring. Used No Sting Skin prep to peristomal skin, then placed barrier and new pouch. Ordered ostomy clamp.                 Pt education: Questions and concerns addressed; see above     Evaluation: patient is having an upper GI procedure tomorrow. Reports the [place] appliance is difficult for her to use independently since she is used to Coloplast. She states she thinks that the appliance was on too long, and asks if someone can assist again in a few days.      Plan: added to ostomy schedule for Tuesday.      Anticipated discharge needs:  Has everything for discharge   "

## 2019-01-05 NOTE — THERAPY
"Speech Language Therapy dysphagia treatment completed.   Functional Status:  Patient seen for swallow therapy this date.  She continues to receive dys II/NTL diet, but reports she is not taking any solid foods at all due to results from UGI.  Report still pending, but per GI notes, \"appears to have stenosis at distal esophagus, critical, but some contrast does make it through to the stomach\" and there is concern for possible achalasia vs mechanical obstruction which will be further assessed during EGD at 0800 tomorrow morning.  Patient continues to take Magic Cups and drink nectars and thin liquids.  Presentation of PO consisted of thin and nectar thick liquids.  Patient continues to use a chin tuck which does help to move liquids and minimizes her feeling of globus as well as potential for aspiration.  On thin liquids, she does take 2-3 swallows to clear, and reports that nectars are easier.  At this time, will change diet to nectar thick full liquids with sips of thins as tolerated.  Would strongly suggest than any PO medications be switched to liquid medications or crushed in nectars for now--would not give whole pills.  SLP will continue to follow.    Recommendations: Nectar thick full liquids--small sips of thin liquids are ok.  Sit at 90* angle for all PO.  PO meds should be liquid or crushed in nectars    Plan of Care: Will benefit from Speech Therapy 3 times per week  Post-Acute Therapy: Discharge to home with outpatient or home health for additional skilled therapy services.    See \"Rehab Therapy-Acute\" Patient Summary Report for complete documentation.     "

## 2019-01-05 NOTE — CARE PLAN
Problem: Knowledge Deficit  Goal: Knowledge of the prescribed therapeutic regimen will improve  Outcome: PROGRESSING AS EXPECTED  Review plan of care, educate patient on prescribed and PRN medications, educate patient how to care for ostomy     Problem: Pain Management  Goal: Pain level will decrease to patient's comfort goal  Outcome: PROGRESSING AS EXPECTED  Assess pain Q 2-4 hours, administer pain medication when indicated, encourage patient to voice pain

## 2019-01-05 NOTE — CONSULTS
DATE OF SERVICE:  01/04/2019    GASTROENTEROLOGY CONSULTATION    CONSULTATION REQUESTED BY:  Félix Palmer MD    REASON FOR CONSULTATION:  Difficulty swallowing.    IDENTIFICATION:  A 63-year-old  female.    CHIEF COMPLAINT:  Difficulty with swallowing and nausea.    HISTORY OF PRESENT ILLNESS:  History was obtained via interview of the   patient, review of [facility] records and review of GI consultant's records.  The   patient has been followed by Dr. Kristel Mahan in our clinics long-term.    She last saw Dr. Mahan on 12/07/2018.  Per review of her clinic note, the   patient was suffering from nausea, vomiting and malaise.  At that time, the   patient had been referred to surgery.  She ultimately ended up being admitted   at [facility] recently and saw Dr. Nunez in consultation.  It is   unclear to me, but for some reason, she did not undergo revision of her   ileostomy at that point.  Here, she was readmitted on 12/28/2018 at [facility] with abdominal pain and vomiting.  She had not been on any   treatment for Crohn's disease specifically.  She was apparently diagnosed at   the age of ____ years ago.  She has had ileal proctocolectomy and had an ileal   reservoir, which was subsequently taken down.  During this hospitalization,   Dr. Cruz did a lysis of adhesion, takedown of ileostomy with repair of   peristomal hernia with placement of Gentrix homograft.  The patient has   complained of difficulty swallowing and reports this got much worse in   November shortly after she had her flu shot.  She is now having difficulty   swallowing any medications and any thin liquids.  She reports that thicker   fluids are okay.  The patient underwent upper GI series.  There is obvious   narrowing at the distal esophagus, some contrast eventually make it through to   the stomach.  The patient reports that she seems to tolerate cold thick fluid   the best and wishes to try these  tonight.  Speech language pathology saw the   patient and deferred to us regarding p.o. intake at this point.  They are   recommending VFSS when she is medically cleared for p.o. intake.  The patient   does complain of frothiness in her mouth.  She has near constant nausea.  She   does have a history of chronic pain syndrome, also joint pain and back pain.    She has had cervical spine and lower spine surgery in the past.  The patient   has chronic DVT in her right lower extremity.  She is now on heparin drip.    She is not on any anticoagulation at presentation.  She also has a history of   atrial fibrillation.    ALLERGIES:  THIS PATIENT HAS AN EXTENSIVE LIST OF ALLERGIES INCLUDING   AZATHIOPRINE, CARAFATE, INFLIXIMAB, MORPHINE, ROXANOL, AMITRIPTYLINE, DEMEROL,   FENTANYL, LORAZEPAM, METHADONE, METHOTREXATE, PREGABALIN, PSEUDOEPHEDRINE,   SULFA DRUGS, TAPE, CELESTONE, SULFASALAZINE, TIZANIDINE.    MEDICATIONS:  Current hospital medications include estradiol, furosemide,   metoprolol, scopolamine, senna p.r.n., milk of magnesia p.r.n., heparin drip.    PAST MEDICAL HISTORY:  1.  Crohn's disease.  2.  Atrial fibrillation.  3.  Anxiety.  4.  Chronic pain syndrome.  5.  Postherpetic neuralgia.  6.  Seizure disorder.    PAST SURGICAL HISTORY:  1.  Ileostomy revision in 2019.  2.  Ileostomy revision in 2014.  3.  Lumbar fusion.  4.  Cervical fusion.  5.  Foot surgery.  6.  Hand surgery.  7.  Ileoscopy.  8.  Gastroscopy (last was in 2011).    SOCIAL HISTORY:  The patient is not a smoker.  No drugs or alcohol.  She is    and lives in [place].    FAMILY HISTORY:  Multiple family members with Crohn's disease.  No one with   colon cancer, liver problems or pancreatic problems.    REVIEW OF SYSTEMS:  See the HPI.  Otherwise, all systems negative per CMS   criteria.    PHYSICAL EXAMINATION:  GENERAL:   female, frail, in no immediate distress, cooperative and   appropriate.  VITAL SIGNS:  Afebrile, heart rate 92,  respiratory rate 17, blood pressure   144/85, oxygen saturation 95% on room air.  HEENT:  Normocephalic and atraumatic.  Sclerae are anicteric.  Conjunctivae   pink.  Oropharynx, moist and clear with a Mallampati score of 1.  No visible   lesions in the oropharynx or on the buccal mucosa or tongue.  NECK:  No thyroid abnormality or lymphadenopathy palpated.  LUNGS:  Clear to auscultation without wheezes.  CARDIOVASCULAR:  Regular rate and rhythm without murmurs.  ABDOMEN:  Bowel sounds present, soft, nontender, nondistended.  Wound is   dressed.  Ostomy in the left mid abdomen.  EXTREMITIES:  2+ pitting edema at the anterior shins bilaterally.  NEUROLOGICAL:  Grossly nonfocal.  Alert and oriented.    LABORATORY DATA:  Hemoglobin 10.1, MCV 92, platelet count 200,000.  Potassium   3.3, magnesium 1.4.  Remainder of hepatic panel unremarkable.  .    Vitamin B12 706 in November.  TSH 0.75.  C-reactive protein 2.53 in December.    IMAGING STUDIES:  1.  CT abdomen and pelvis from 12/12/2018 status post colon resection, left   lower quadrant ileostomy.  No evidence of small-bowel obstruction.  No   mesenteric inflammatory changes or free fluid identified.  2.  Upper gastrointestinal series from 01/04/2019 -- official read pending,   appears to have stenosis at distal esophagus, critical, but some contrast does   make it through to the stomach.    IMPRESSION:  Complex 63-year-old female with a longstanding history of Crohn's   disease.  She denies any known history of Crohn's disease of the upper GI   tract, but this would be a consideration, now with worsening of her dysphagia   and imaging suggestive of possible mechanical obstruction versus motility   disorder (such as achalasia) at the distal esophagus,   esophagogastroduodenoscopy is warranted.  Tentatively, we will plan for   morning of 01/06/2019 at 8:00 a.m. with anesthesiologist assistance given her   high risk and comorbid conditions (not appropriate for  moderate sedation).  At   this point, I will allow her to take small amounts of dysphagia 2 soft GI   diet cautiously and await official read on her barium swallow.  Other   considerations in regard to her findings would include esophageal or GE   junction neoplasm stricturing.    PROBLEM LIST:  1.  Esophageal dysphagia.  2.  Abnormal upper gastrointestinal imaging (see above).  3.  Crohn's disease with history of ileal and colonic involvement.  4.  Deep venous thrombosis, chronic.  5.  Anticoagulation therapy.  6.  Nausea.  7.  Ileostomy stenosis -- status post repair.  8.  Atrial fibrillation.  9.  History of vancomycin-resistant enterococcus.  10.  History of methicillin-resistant Staphylococcus aureus.  11.  Chronic pain syndrome.  12.  Chronic anemia.  13.  Hypokalemia.  14.  Hypomagnesemia.    PLAN AND RECOMMENDATIONS:  1.  EGD with possible dilation/biopsy tentatively scheduled for 01/06.  2.  Dysphagia 2 soft diet.  3.  N.p.o. after midnight tomorrow in anticipation for procedure.  If the   patient manifests evidence of any aspiration before then, we will make her   n.p.o.  4.  Plan to turn heparin off at midnight tomorrow night in anticipation for   procedure early in the morning on the following morning.  5.  Possible VFSS per speech language pathology at a later time.  6.  Replete potassium and magnesium (I will defer to the primary team).  7.  Check the zinc with her morning labs tomorrow as she may be at risk for   zinc deficiency in addition to iron and B12 deficiency given her Crohn's   disease involving the ileum.    Thank you for allowing me to participate in the care of this complex patient.    I will discuss the case with Dr. Daniels, who will round tomorrow.       ____________________________________     MD EDA SMALLS / TUCKER    DD:  01/04/2019 16:37:50  DT:  01/04/2019 17:11:56    D#:  [id number]  Job#:  [id number]    cc: TYSON MARTINEZ MD, BALJEET DANIELS MD, BRISEIDA MCNULYT MD, SYDNEE  FAWN ARENAS,   MANFRED LEDEZMA MD

## 2019-01-05 NOTE — PROGRESS NOTES
"Pt is raising voice at this RN demanding \"I'm telling you, you have to call the doctor to increase the dilaudid or change the frequency. I'm playing catch up.\" This RN offered liquid oxy, pt continues to refuse. Updated CUBA Jaimes on pt's continuing refusal.   New orders received to try IV toradol.   "

## 2019-01-05 NOTE — PROGRESS NOTES
[facility] Daily Progress Note    Date of Service  1/4/2019    Chief Complaint  Abdominal pain    Hospital Course    63 y.o. female with PMH Crohn's with previous colectomy with ileostomy admitted 12/28/2018 with generalized abdominal pain, nausea decreased output from her ileostomy.  Recently hospitalized at [facility] and evaluated by Dr. Nunez.  Patient was evaluated by surgery.  Patient underwent surgery ostomy revision and takedown of a new ostomy creation 12/29.  Patient tolerated procedure very well.      Interval Problem Update  AB surgery-pain continues to be an ongoing issue, but ostomy output remains ok and non bloody.  Patient has swallowing issues, UPPER GI series shows near complete blockage of contrast at the GE junction. Consulted Jeremiah of GI. Still having frothiness in her mouth and upper esophageal areas.     DVT-continues on heparin gtt with no overt bleeding issues at this time.       Consultants/Specialty  -General surgery  -GI    Code Status  Full    Disposition  TBD    Review of Systems  Review of Systems   Constitutional: Negative for fever.        No interval improvement   HENT: Negative for hearing loss.    Eyes: Negative for pain.   Respiratory: Negative for shortness of breath.    Cardiovascular: Positive for leg swelling (no change around her feet today). Negative for palpitations.   Gastrointestinal: Positive for abdominal pain (better controlled today) and nausea. Negative for constipation, diarrhea and vomiting.        Swallowing issues continue and remains persistent at this time   Genitourinary: Negative for frequency and urgency.   Skin: Negative for rash.   Neurological: Positive for weakness. Negative for dizziness.   Psychiatric/Behavioral: Negative for depression. The patient is nervous/anxious (very anxious).         Slightly less upset today   All other systems reviewed and are negative.       Physical Exam  Temp:  [36.3 °C (97.3 °F)-36.8 °C (98.2 °F)] 36.3 °C  (97.3 °F)  Pulse:  [] 92  Resp:  [16-20] 17  BP: (131-150)/(61-91) 144/85    Physical Exam   Constitutional: She is oriented to person, place, and time. Vital signs are normal. She appears well-developed. She is cooperative.   HENT:   Head: Normocephalic.   Right Ear: Hearing normal.   Left Ear: Hearing normal.   Nose: Nose normal.   Mouth/Throat: Oropharynx is clear and moist and mucous membranes are normal. No oropharyngeal exudate.   Eyes: Pupils are equal, round, and reactive to light. Conjunctivae and EOM are normal. No scleral icterus.   Neck: Normal range of motion and phonation normal. Neck supple.   Cardiovascular: Regular rhythm, normal heart sounds and intact distal pulses.  Tachycardia present.  Exam reveals no gallop.    No edema  Cap refill WNL   Pulmonary/Chest: Effort normal and breath sounds normal. No stridor. She has no rhonchi.   Abdominal: Soft. Normal appearance. Bowel sounds are decreased. There is generalized tenderness.   Ileostomy with liquid, orange color output, no interval changes noted today  Stoma remains pink  Midline abdominal vertical incision with staples in place, appears C/D/I,  Continues with diffuse moderate TTP   Musculoskeletal: Normal range of motion. She exhibits edema (pedal B/L, warm, ). She exhibits no tenderness.   No change to edema     Neurological: She is alert and oriented to person, place, and time. She has normal strength.   Skin: Skin is warm and dry. She is not diaphoretic. No pallor.   Psychiatric: Her mood appears not anxious. Her speech is not rapid and/or pressured.   Quite anxious but less upset today   Nursing note and vitals reviewed.      Fluids    Intake/Output Summary (Last 24 hours) at 01/04/19 1617  Last data filed at 01/04/19 1230   Gross per 24 hour   Intake              788 ml   Output             3000 ml   Net            -2212 ml       Laboratory  Recent Labs      01/02/19   0424  01/04/19   0244   WBC  5.7  7.0   RBC  3.24*  3.32*    HEMOGLOBIN  9.8*  10.1*   HEMATOCRIT  30.1*  30.5*   MCV  92.9  91.9   MCH  30.2  30.4   MCHC  32.6*  33.1*   RDW  45.1  44.2   PLATELETCT  164  200   MPV  10.4  10.1     Recent Labs      01/02/19   0424  01/03/19   0235  01/04/19   0244   SODIUM  140  139  142   POTASSIUM  3.5*  3.5*  3.3*   CHLORIDE  111  107  106   CO2  24  27  29   GLUCOSE  98  91  94   BUN  5*  4*  4*   CREATININE  0.59  0.77  0.75   CALCIUM  9.2  9.0  9.1     Recent Labs      01/03/19   0235  01/04/19   0244  01/04/19   1014   APTT  73.1*  53.9*  101.2*               Imaging  US-EXTREMITY VENOUS LOWER BILAT   Final Result      IR-US GUIDED PIV   Final Result    Ultrasound-guided PERIPHERAL IV INSERTION performed by    qualified nursing staff as above.            IR-MIDLINE CATHETER INSERTION >5 YRS    (Results Pending)   DX-UPPER GI-SERIES WITH KUB    (Results Pending)        Assessment/Plan  * Intractable cyclical vomiting with nausea- (present on admission)   Assessment & Plan    -nausea remains an ongoing issues, continue conservative management with IV anti-emetics PRN  -Tolerated surgery very well.  -IV fluid hydration with KCl  -added IV compazine PRN yesterday       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HCC)- (present on admission)   Assessment & Plan    Likely to be the reason for nausea vomiting., see below  Status post surgery 12/30, POD#7  Tolerated procedure very well.  Continue monitor by surgery.  -good output now, non bloody, pain is an issues as noted above  -oral dysphagia issues remain  -UPPER GI series given pain control issues and see note below, though ostomy appears to be working     Crohn's disease of small intestine with complication (HCC)- (present on admission)   Assessment & Plan    -Followed as outpatient by Dr. Cruz  -Not currently in acute exacerbation       Pedal edema   Assessment & Plan    2/2 surgery and volume resuscitation, slowly improving  -IV lasix 20 mg daily, mechanically elevate legs     DVT (deep venous thrombosis)  (HCC)   Assessment & Plan    Patient does have a history of DVT.  Ultrasound today showing newly developed a DVT, likely provoked by recent surgery  -continue weight based heparin gtt, tolerating well, goal PTT 60-90, check PTT Q6 hours, adjust PRN  -she refuses oral AC, will need to transition to sq lovenox on discharge, continues without bleeding issues, continue current course of action  -likely total time period for AC will be 3-6 months     History of infection with vancomycin resistant Enterococcus (VRE)- (present on admission)   Assessment & Plan    -remote history of     History of MRSA infection- (present on admission)   Assessment & Plan    -Per chart review (+) MRSA sergio 2014     Dysphasia- (present on admission)   Assessment & Plan    -History of  -Followed by GI as outpatient  -Aspiration precautions  -SLP eval-see above  -UPPER GI with almost complete blockage of contrast material at the GE junction  -?achalasia, tumor, stricture, extension of Crohns?  -last EGD in 2011 was normal  -NPO  -Consulted Dr Daniels of GI for further evaluation and possible EGD     Hypokalemia- (present on admission)   Assessment & Plan    -resolved, replace PRN     DDD (degenerative disc disease), lumbar- (present on admission)   Assessment & Plan    -Chronic pain management     Paroxysmal atrial fibrillation (HCC)- (present on admission)   Assessment & Plan    -Not currently on chronic anticoagulation secondary to Crohn's  -Continue home metoprolol  -Baby ASA, hold while on heparin  -no current issues       Opioid type dependence, continuous (CMS-HCC)- (present on admission)   Assessment & Plan    -Continue home oxycodone, though patient refuses oral formulation solution given prior intolerances  -have  bring in bottle unopened and have verified by pharmacy?  -concern about opioid addiction, continue IV dilaudid at current parameters, but given POD#7, no advancements planned at this time, continue current regimen  at this time, ostomy is producing output     Chronic pain- (present on admission)   Assessment & Plan    -On chronic opioid management -Dr. Telles  -Bowel protocol            VTE prophylaxis: heparin gtt                Félix Palmer M.D.

## 2019-01-05 NOTE — PROGRESS NOTES
"Patient requesting dilaudid but appears drowsy, speaking to RN with eyes closed. When this RN asked patient to open eyes and make eye contact, patient was only able to open eyes part way. Patient then stated \"what are you staring at? Theres nothing wrong with my eyes\". This RN told patient that her alertness was being assessed. Patient then stated \" I'm in pain because no one gave me my eye drops last night, so I can't open them. I've been in pain all night long, and this is [facility] doing the same thing it always does.\" This RN checked on patient frequently throughout the night, and patient slept throuhout the night (see pain charting). Patient then opened eyes and stated \"here fine I'll look into your eyes ooooooh look at me is this what you want\". Patient then stated \"why are you looking at me like that, what's wrong with you, what's your problem!\". This RN educated patient that she did not feel comfortable giving patient dialudid at this time. Patient then stated \"well then that's your problem!\".     This RN also attempted to give patient her oral medications, but patient refused, stating \" I'm NPO!\" This RN educated patient that she is not to be NPO until midnight tonight 1/5/19 for her procedure on 1/6/19. Patient then stated \"Are you not hearing me? I can't take anything orally right now, I'm too nauseous!\"   "

## 2019-01-05 NOTE — PROGRESS NOTES
APTT Results 101.2  Following weight based protocol - no bolus and decrease 150units/hour  Repeat APTT at 1800 today

## 2019-01-06 LAB
ANION GAP SERPL CALC-SCNC: 8 MMOL/L (ref 0–11.9)
APTT PPP: 54.9 SEC (ref 24.7–36)
BUN SERPL-MCNC: 5 MG/DL (ref 8–22)
CALCIUM SERPL-MCNC: 8.9 MG/DL (ref 8.5–10.5)
CHLORIDE SERPL-SCNC: 105 MMOL/L (ref 96–112)
CO2 SERPL-SCNC: 26 MMOL/L (ref 20–33)
CREAT SERPL-MCNC: 0.73 MG/DL (ref 0.5–1.4)
ERYTHROCYTE [DISTWIDTH] IN BLOOD BY AUTOMATED COUNT: 48 FL (ref 35.9–50)
GLUCOSE SERPL-MCNC: 97 MG/DL (ref 65–99)
HCT VFR BLD AUTO: 29.8 % (ref 37–47)
HGB BLD-MCNC: 9.4 G/DL (ref 12–16)
MCH RBC QN AUTO: 29.7 PG (ref 27–33)
MCHC RBC AUTO-ENTMCNC: 31.5 G/DL (ref 33.6–35)
MCV RBC AUTO: 94.3 FL (ref 81.4–97.8)
PLATELET # BLD AUTO: 203 K/UL (ref 164–446)
PMV BLD AUTO: 10.3 FL (ref 9–12.9)
POTASSIUM SERPL-SCNC: 3.7 MMOL/L (ref 3.6–5.5)
RBC # BLD AUTO: 3.16 M/UL (ref 4.2–5.4)
SODIUM SERPL-SCNC: 139 MMOL/L (ref 135–145)
WBC # BLD AUTO: 7.9 K/UL (ref 4.8–10.8)

## 2019-01-06 PROCEDURE — 36415 COLL VENOUS BLD VENIPUNCTURE: CPT

## 2019-01-06 PROCEDURE — 770006 HCHG ROOM/CARE - MED/SURG/GYN SEMI*

## 2019-01-06 PROCEDURE — 665999 HH PPS REVENUE DEBIT

## 2019-01-06 PROCEDURE — 160035 HCHG PACU - 1ST 60 MINS PHASE I: Performed by: INTERNAL MEDICINE

## 2019-01-06 PROCEDURE — 160002 HCHG RECOVERY MINUTES (STAT): Performed by: INTERNAL MEDICINE

## 2019-01-06 PROCEDURE — 700101 HCHG RX REV CODE 250

## 2019-01-06 PROCEDURE — 160048 HCHG OR STATISTICAL LEVEL 1-5: Performed by: INTERNAL MEDICINE

## 2019-01-06 PROCEDURE — 700111 HCHG RX REV CODE 636 W/ 250 OVERRIDE (IP): Performed by: INTERNAL MEDICINE

## 2019-01-06 PROCEDURE — 160036 HCHG PACU - EA ADDL 30 MINS PHASE I: Performed by: INTERNAL MEDICINE

## 2019-01-06 PROCEDURE — 160203 HCHG ENDO MINUTES - 1ST 30 MINS LEVEL 4: Performed by: INTERNAL MEDICINE

## 2019-01-06 PROCEDURE — 85730 THROMBOPLASTIN TIME PARTIAL: CPT

## 2019-01-06 PROCEDURE — 700111 HCHG RX REV CODE 636 W/ 250 OVERRIDE (IP): Performed by: ANESTHESIOLOGY

## 2019-01-06 PROCEDURE — 85027 COMPLETE CBC AUTOMATED: CPT

## 2019-01-06 PROCEDURE — 0DB58ZX EXCISION OF ESOPHAGUS, VIA NATURAL OR ARTIFICIAL OPENING ENDOSCOPIC, DIAGNOSTIC: ICD-10-PCS | Performed by: INTERNAL MEDICINE

## 2019-01-06 PROCEDURE — 665998 HH PPS REVENUE CREDIT

## 2019-01-06 PROCEDURE — 80048 BASIC METABOLIC PNL TOTAL CA: CPT

## 2019-01-06 PROCEDURE — 0D738ZZ DILATION OF LOWER ESOPHAGUS, VIA NATURAL OR ARTIFICIAL OPENING ENDOSCOPIC: ICD-10-PCS | Performed by: INTERNAL MEDICINE

## 2019-01-06 PROCEDURE — 700105 HCHG RX REV CODE 258

## 2019-01-06 PROCEDURE — 88305 TISSUE EXAM BY PATHOLOGIST: CPT

## 2019-01-06 PROCEDURE — 700111 HCHG RX REV CODE 636 W/ 250 OVERRIDE (IP): Performed by: NURSE PRACTITIONER

## 2019-01-06 PROCEDURE — 500066 HCHG BITE BLOCK, ECT: Performed by: INTERNAL MEDICINE

## 2019-01-06 PROCEDURE — 160009 HCHG ANES TIME/MIN: Performed by: INTERNAL MEDICINE

## 2019-01-06 PROCEDURE — 700111 HCHG RX REV CODE 636 W/ 250 OVERRIDE (IP): Performed by: HOSPITALIST

## 2019-01-06 PROCEDURE — 99233 SBSQ HOSP IP/OBS HIGH 50: CPT | Performed by: INTERNAL MEDICINE

## 2019-01-06 PROCEDURE — 700111 HCHG RX REV CODE 636 W/ 250 OVERRIDE (IP)

## 2019-01-06 RX ORDER — SODIUM CHLORIDE 9 MG/ML
INJECTION, SOLUTION INTRAVENOUS
Status: COMPLETED
Start: 2019-01-06 | End: 2019-01-06

## 2019-01-06 RX ORDER — ONDANSETRON 2 MG/ML
4 INJECTION INTRAMUSCULAR; INTRAVENOUS
Status: COMPLETED | OUTPATIENT
Start: 2019-01-06 | End: 2019-01-06

## 2019-01-06 RX ORDER — HALOPERIDOL 5 MG/ML
1 INJECTION INTRAMUSCULAR
Status: DISCONTINUED | OUTPATIENT
Start: 2019-01-06 | End: 2019-01-06 | Stop reason: HOSPADM

## 2019-01-06 RX ORDER — SODIUM CHLORIDE, SODIUM LACTATE, POTASSIUM CHLORIDE, CALCIUM CHLORIDE 600; 310; 30; 20 MG/100ML; MG/100ML; MG/100ML; MG/100ML
INJECTION, SOLUTION INTRAVENOUS CONTINUOUS
Status: DISCONTINUED | OUTPATIENT
Start: 2019-01-06 | End: 2019-01-06 | Stop reason: HOSPADM

## 2019-01-06 RX ORDER — HYDROMORPHONE HYDROCHLORIDE 1 MG/ML
0.5 INJECTION, SOLUTION INTRAMUSCULAR; INTRAVENOUS; SUBCUTANEOUS EVERY 4 HOURS PRN
Status: DISCONTINUED | OUTPATIENT
Start: 2019-01-06 | End: 2019-01-08 | Stop reason: HOSPADM

## 2019-01-06 RX ORDER — OXYCODONE HCL 5 MG/5 ML
10 SOLUTION, ORAL ORAL
Status: DISCONTINUED | OUTPATIENT
Start: 2019-01-06 | End: 2019-01-06 | Stop reason: HOSPADM

## 2019-01-06 RX ORDER — OXYCODONE HCL 5 MG/5 ML
5 SOLUTION, ORAL ORAL
Status: DISCONTINUED | OUTPATIENT
Start: 2019-01-06 | End: 2019-01-06 | Stop reason: HOSPADM

## 2019-01-06 RX ORDER — DIPHENHYDRAMINE HYDROCHLORIDE 50 MG/ML
12.5 INJECTION INTRAMUSCULAR; INTRAVENOUS
Status: DISCONTINUED | OUTPATIENT
Start: 2019-01-06 | End: 2019-01-06 | Stop reason: HOSPADM

## 2019-01-06 RX ADMIN — HYDROMORPHONE HYDROCHLORIDE 1 MG: 1 INJECTION, SOLUTION INTRAMUSCULAR; INTRAVENOUS; SUBCUTANEOUS at 12:14

## 2019-01-06 RX ADMIN — SODIUM CHLORIDE 500 ML: 9 INJECTION, SOLUTION INTRAVENOUS at 05:58

## 2019-01-06 RX ADMIN — PROCHLORPERAZINE EDISYLATE 10 MG: 5 INJECTION INTRAMUSCULAR; INTRAVENOUS at 06:14

## 2019-01-06 RX ADMIN — HEPARIN SODIUM 3200 UNITS: 1000 INJECTION, SOLUTION INTRAVENOUS; SUBCUTANEOUS at 17:35

## 2019-01-06 RX ADMIN — HYDROMORPHONE HYDROCHLORIDE 0.5 MG: 1 INJECTION, SOLUTION INTRAMUSCULAR; INTRAVENOUS; SUBCUTANEOUS at 20:25

## 2019-01-06 RX ADMIN — PROCHLORPERAZINE EDISYLATE 10 MG: 5 INJECTION INTRAMUSCULAR; INTRAVENOUS at 12:14

## 2019-01-06 RX ADMIN — PROCHLORPERAZINE EDISYLATE 10 MG: 5 INJECTION INTRAMUSCULAR; INTRAVENOUS at 00:09

## 2019-01-06 RX ADMIN — HYDROMORPHONE HYDROCHLORIDE 0.5 MG: 1 INJECTION, SOLUTION INTRAMUSCULAR; INTRAVENOUS; SUBCUTANEOUS at 16:19

## 2019-01-06 RX ADMIN — ONDANSETRON HYDROCHLORIDE 4 MG: 2 INJECTION, SOLUTION INTRAMUSCULAR; INTRAVENOUS at 20:25

## 2019-01-06 RX ADMIN — HYDROMORPHONE HYDROCHLORIDE 1 MG: 1 INJECTION, SOLUTION INTRAMUSCULAR; INTRAVENOUS; SUBCUTANEOUS at 06:14

## 2019-01-06 RX ADMIN — SODIUM CHLORIDE 500 ML: 9 INJECTION, SOLUTION INTRAVENOUS at 05:59

## 2019-01-06 RX ADMIN — ONDANSETRON HYDROCHLORIDE 4 MG: 2 INJECTION, SOLUTION INTRAMUSCULAR; INTRAVENOUS at 10:29

## 2019-01-06 RX ADMIN — KETOROLAC TROMETHAMINE 30 MG: 30 INJECTION, SOLUTION INTRAMUSCULAR at 23:24

## 2019-01-06 RX ADMIN — HYDROMORPHONE HYDROCHLORIDE 1 MG: 1 INJECTION, SOLUTION INTRAMUSCULAR; INTRAVENOUS; SUBCUTANEOUS at 03:22

## 2019-01-06 RX ADMIN — ONDANSETRON HYDROCHLORIDE 4 MG: 2 INJECTION, SOLUTION INTRAMUSCULAR; INTRAVENOUS at 16:19

## 2019-01-06 RX ADMIN — ONDANSETRON HYDROCHLORIDE 4 MG: 2 INJECTION, SOLUTION INTRAMUSCULAR; INTRAVENOUS at 03:22

## 2019-01-06 RX ADMIN — HYDROMORPHONE HYDROCHLORIDE 1 MG: 1 INJECTION, SOLUTION INTRAMUSCULAR; INTRAVENOUS; SUBCUTANEOUS at 00:09

## 2019-01-06 RX ADMIN — HEPARIN SODIUM 950 UNITS/HR: 5000 INJECTION, SOLUTION INTRAVENOUS; SUBCUTANEOUS at 16:48

## 2019-01-06 ASSESSMENT — ENCOUNTER SYMPTOMS
DIARRHEA: 0
SHORTNESS OF BREATH: 0
WHEEZING: 0
HEARTBURN: 0
ABDOMINAL PAIN: 1
WEAKNESS: 1
COUGH: 0
NAUSEA: 0
SENSORY CHANGE: 0
HEADACHES: 0
NAUSEA: 1
CONSTIPATION: 0
CHILLS: 0
MYALGIAS: 1
VOMITING: 0
NERVOUS/ANXIOUS: 1
TINGLING: 0
BLOOD IN STOOL: 0
BLURRED VISION: 0
FEVER: 0
SPEECH CHANGE: 0
DIZZINESS: 0
TREMORS: 0

## 2019-01-06 ASSESSMENT — PAIN SCALES - GENERAL
PAINLEVEL_OUTOF10: 8
PAINLEVEL_OUTOF10: 8
PAINLEVEL_OUTOF10: 7
PAINLEVEL_OUTOF10: 9
PAINLEVEL_OUTOF10: 9
PAINLEVEL_OUTOF10: 8
PAINLEVEL_OUTOF10: 9
PAINLEVEL_OUTOF10: 9
PAINLEVEL_OUTOF10: 5
PAINLEVEL_OUTOF10: 7
PAINLEVEL_OUTOF10: 7
PAINLEVEL_OUTOF10: 0

## 2019-01-06 NOTE — OP REPORT
DATE OF SERVICE:  01/06/2019    REFERRING PHYSICIAN:  Félix Palmer MD    PROCEDURES PERFORMED:  Gastroscopy with esophageal dilation and esophageal   biopsies.    INDICATION:  A 63-year-old female in the hospital for revision of an   ileostomy, but with worsening esophageal dysphagia.  Barium esophagram shows a   very spastic distal esophagus and contrast took several minutes to go into   the stomach.    ASA:  Class III.    SEDATION:  Propofol sedation per Dr. Chaney.    FINDINGS:  1.  Spastic lower esophagus with possible LES stricture dilated to 51-Kyrgyz.  2.  Esophageal mucosa, biopsied for eosinophilic esophagitis.  3.  Normal stomach.  4.  Normal duodenum.    DESCRIPTION OF PROCEDURE:  After informed consent and a time-out, she was   administered moderate IV propofol sedation until she was comfortably sedated.    The Olympus flexible video gastroscope was passed into the esophagus.  Mucosa   was normal down to the GE junction at 40 cm, but the distal esophagus was   quite spastic and I had to advance the scope through the lower esophageal   sphincter with gentle pressure.  The distal esophagus did not distend at all   and was spastic and if there was a stricture there I did not appreciate.  The   stomach was entered and insufflated with air.  The mid body and antrum were   normal.  Duodenal bulb and second portion were normal.  Retroflex view of the   gastric cardia was normal.    The scope was then brought back up into the esophagus and multiple biopsies   were taken from the distal and proximal esophagus.    A Savary wire was then passed through the scope and the scope was withdrawn   over the wire, esophageal dilators starting at 45-Kyrgyz and going up to   51-Kyrgyz were passed, a total of 3 dilators were passed and fairly snug.    Follow up look with the scope did not show any mucosal injury.  She tolerated   the procedure well and went to recovery room in stable condition.    IMPRESSION:   Esophageal dysphagia, either due to spastic esophageal motility   problem or possibly a stricture.    PLAN:  1.  Resume a full liquid diet.  2.  If there is improvement she could try a p.o. diltiazem for esophageal   spasm.  3.  She may up needing lower esophageal sphincter Botox injection and   manometry evaluation for achalasia.  Her lower esophageal sphincter was quite   spastic.       ____________________________________     MD SHARI ANGUIANO / NTS    DD:  01/06/2019 08:47:04  DT:  01/06/2019 09:08:55    D#:  [id number]  Job#:  [id number]    cc: MANFRED LEDEZMA MD

## 2019-01-06 NOTE — PROGRESS NOTES
Report received from day shift RN, assumed Care.   Patient is AOx4, responds appropriately.      Pt reports pain 8/10, medicated per MAR.   Patient is tolerating nectar thick full liquid diet, denies vomiting, slight nausea present, medicated per MAR. Will be NPO @ midnight due to EGD procedure in the am. LUQ ostomy in place, no drainage noted, stoma pink and moist.  Midline incision well approximated with staples.   Up self with steady gait.    Plan of care discussed, all questions answered.    Educated on use of call light and importance of calling before getting out of bed. Pt verbalizes understanding.    Call light and belongings within reach, treaded slipper socks on, bed alarm in use, SCDs contraindicated.  All needs met at this time.

## 2019-01-06 NOTE — OR SURGEON
Immediate Post OP Note    PreOp Diagnosis: Dysphagia, abnl barium swallow.     PostOp Diagnosis:   1. Spastic lower esophagus with possible stricture dilated to 51FR  2. Esophageal mucosa bx for EoE.  3. NL stomach  4. NL Dudenum.     Procedure(s):  GASTROSCOPY- WITH DILATION - Wound Class: Clean Contaminated    Surgeon(s):  Daniel Daniels M.D.    Anesthesiologist/Type of Anesthesia:  Anesthesiologist: Edwin Chaney M.D./General    Surgical Staff:  Circulator: Shelley Polo R.N.  Endoscopy Technician: Porsche Gonsalez    Specimens removed if any:  ID Type Source Tests Collected by Time Destination   A : Esophageal biopsy Tissue Esophagus PATHOLOGY SPECIMEN Daniel Daniels M.D. 1/6/2019  8:24 AM        Estimated Blood Loss: None    Findings: see above.     Complications: none intraprocedure.  Plan:  1. Full liquids today.  2. No no improvement could try PO diltiazem for esophageal spasm.  3. She may end up needing LES botox and manometry eval for achalasia. LES is quite spastic.         1/6/2019 8:39 AM Daniel Daniels M.D.

## 2019-01-06 NOTE — PROGRESS NOTES
Hospital Medicine Daily Progress Note    Date of Service  1/6/2019    Chief Complaint  Abdominal pain    Hospital Course    63 y.o. female with PMH Crohn's with previous colectomy with ileostomy admitted 12/28/2018 with generalized abdominal pain, nausea decreased output from her ileostomy.  Recently hospitalized at [facility] and evaluated by Dr. Nunez.  Patient was evaluated by surgery.  Patient underwent surgery ostomy revision and takedown of a new ostomy creation 12/29.  Patient tolerated procedure very well.  Has ongoing dysphagia.  Pending EGD.      Interval Problem Update  1/5:  Continues to be very anxious, constantly requesting more IV pain medications.  Clinical status does not correlate with requests for increased pain medications.  Also complains of ongoing nausea.  VSS.  1/6:  S/P EGD with dilation.  Patient told her IV dilaudid dose would be decreased as she will start oral pain medications.  She became very aggressive threatening to request a new physician if she doesn't get the pain medications she wants.  Exhibiting extreme drug-seeking behaviors.        Consultants/Specialty  -General surgery  -GI    Code Status  Full    Disposition  TBD    Review of Systems  Review of Systems   Constitutional: Negative for fever.        No interval improvement   HENT: Negative for congestion.    Eyes: Negative for blurred vision.   Respiratory: Negative for cough, shortness of breath and wheezing.    Cardiovascular: Positive for leg swelling (no change around her feet today). Negative for chest pain.   Gastrointestinal: Positive for abdominal pain (better controlled today) and nausea. Negative for blood in stool, constipation and diarrhea.   Genitourinary: Negative for dysuria and urgency.   Musculoskeletal: Positive for myalgias. Negative for joint pain.   Skin: Negative for rash.   Neurological: Positive for weakness. Negative for dizziness, tingling, tremors, sensory change, speech change and headaches.    Psychiatric/Behavioral: The patient is nervous/anxious (very anxious).         Physical Exam  Temp:  [36.2 °C (97.1 °F)-36.8 °C (98.3 °F)] 36.2 °C (97.1 °F)  Pulse:  [] 67  Resp:  [13-27] 18  BP: (116-163)/(50-76) 121/57    Physical Exam   Constitutional: She is oriented to person, place, and time. Vital signs are normal. She appears well-developed. She is cooperative.   HENT:   Head: Normocephalic and atraumatic.   Right Ear: Hearing and external ear normal.   Left Ear: Hearing and external ear normal.   Mouth/Throat: Mucous membranes are normal. No oropharyngeal exudate.   Eyes: Pupils are equal, round, and reactive to light. Conjunctivae are normal. No scleral icterus.   Neck: Normal range of motion and phonation normal. Neck supple.   Cardiovascular: Regular rhythm, normal heart sounds and intact distal pulses.  Tachycardia present.  Exam reveals no friction rub.    No murmur heard.  Pulmonary/Chest: Effort normal and breath sounds normal. No stridor. No respiratory distress. She has no wheezes. She has no rhonchi.   Abdominal: Soft. Normal appearance and bowel sounds are normal. She exhibits no distension. There is tenderness. There is no rebound and no guarding.   Ileostomy   Stoma remains pink  Midline abdominal vertical incision with staples in place, appears C/D/I,     Musculoskeletal: Normal range of motion. She exhibits edema (pedal B/L, warm, ). She exhibits no deformity.   BLE edema.     Neurological: She is alert and oriented to person, place, and time. She has normal strength.   Skin: Skin is warm and dry. No rash noted. She is not diaphoretic. No erythema.   Psychiatric: Her mood appears anxious. She is agitated and aggressive.   Nursing note and vitals reviewed.      Fluids    Intake/Output Summary (Last 24 hours) at 01/06/19 1310  Last data filed at 01/06/19 1100   Gross per 24 hour   Intake              511 ml   Output             1200 ml   Net             -689 ml       Laboratory  Recent  Labs      01/04/19   0244  01/05/19   0003  01/06/19   0317   WBC  7.0  11.2*  7.9   RBC  3.32*  3.60*  3.16*   HEMOGLOBIN  10.1*  10.9*  9.4*   HEMATOCRIT  30.5*  32.9*  29.8*   MCV  91.9  91.4  94.3   MCH  30.4  30.3  29.7   MCHC  33.1*  33.1*  31.5*   RDW  44.2  44.8  48.0   PLATELETCT  200  238  203   MPV  10.1  10.2  10.3     Recent Labs      01/04/19   0244  01/05/19   0003  01/06/19   0317   SODIUM  142  136  139   POTASSIUM  3.3*  3.3*  3.7   CHLORIDE  106  101  105   CO2  29  25  26   GLUCOSE  94  101*  97   BUN  4*  6*  5*   CREATININE  0.75  0.99  0.73   CALCIUM  9.1  9.7  8.9     Recent Labs      01/04/19   1755  01/05/19   0003  01/05/19   0334   APTT  58.2*  55.2*  75.8*               Imaging  US-EXTREMITY VENOUS LOWER BILAT   Final Result      IR-US GUIDED PIV   Final Result    Ultrasound-guided PERIPHERAL IV INSERTION performed by    qualified nursing staff as above.            IR-MIDLINE CATHETER INSERTION >5 YRS    (Results Pending)   DX-UPPER GI-SERIES WITH KUB    (Results Pending)        Assessment/Plan  * Intractable cyclical vomiting with nausea- (present on admission)   Assessment & Plan    -nausea remains an ongoing issues  -No further evidence of vomiting.  -Continue anti-emetics.         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HCC)- (present on admission)   Assessment & Plan    Status post surgery 12/30, POD#7  Tolerated procedure very well.  Continue monitor by surgery.  -good output now, non bloody  -Continues to complain of severe pain with no clinical evidence.  Concern for drug-seeking.    -Decrease IV dilaudid.     Crohn's disease of small intestine with complication (HCC)- (present on admission)   Assessment & Plan    -Followed as outpatient by Dr. Cruz  -Not currently in acute exacerbation       DVT (deep venous thrombosis) ([facility])   Assessment & Plan    Patient does have a history of DVT.  Ultrasound today showing newly developed a DVT, likely provoked by recent surgery  -continue weight based heparin  gtt, tolerating well, goal PTT 60-90, check PTT Q6 hours, adjust PRN  -she refuses oral AC secondary to dysphagia.    -Re-evaluate after EGD.  -likely total time period for AC will be 3-6 months     Pedal edema   Assessment & Plan    2/2 surgery and volume resuscitation, slowly improving  -Hold lasix due to hypokalemia and poor oral intake.     History of infection with vancomycin resistant Enterococcus (VRE)- (present on admission)   Assessment & Plan    -remote history of     History of MRSA infection- (present on admission)   Assessment & Plan    -Per chart review (+) MRSA kentrelles 2014     Dysphasia- (present on admission)   Assessment & Plan    -History of  -Followed by GI as outpatient  -Aspiration precautions  -UPPER GI with almost complete blockage of contrast material at the GE junction  -S/P EGD 1/6 with esophageal dilation and biopsy.    -Achalasia not r/o.  May need manometry eval and LES botox if symptoms persist.  -GI following.     Hypokalemia- (present on admission)   Assessment & Plan    Improved.  Monitor bmp.     DDD (degenerative disc disease), lumbar- (present on admission)   Assessment & Plan    -Chronic pain management     Paroxysmal atrial fibrillation (HCC)- (present on admission)   Assessment & Plan    -Not currently on chronic anticoagulation secondary to Crohn's  -Continue home metoprolol  -Baby ASA, Heparin.  -no current issues       Opioid type dependence, continuous (CMS-HCC)- (present on admission)   Assessment & Plan    -Continue home oxycodone, though patient refuses oral formulation solution given prior intolerances  -have  bring in bottle unopened and have verified by pharmacy?  -concern about opioid addiction  -Patient very aggressive when told her IV narcotics would be decreased.  -Exhibits drug-seeking behaviors.       Chronic pain- (present on admission)   Assessment & Plan    -On chronic opioid management -Dr. Telles  -Bowel protocol            VTE prophylaxis: heparin  MARCO Lopez

## 2019-01-06 NOTE — PROGRESS NOTES
Surgical Progress Note    Author: Tereza Beard Date & Time created: 2019   10:25 AM     Interval Events:  POD#6, exploratory laparotomy, adhesiolysis, takedown of ileostomy, repair of parastomal hernia with placement of Gentrix xenograft hernia matrix, recreation of new ileostomy, ileoscopy using flexible sigmoidoscope. Pt in PACU s/p EGD and dilation. Incisional abdominal pain, right sided pain. Stool and gas in ostomy appliance.  Groggy. NAD. Breathing unlabored. Abdomen soft. Dressings removed. Staples in place. Incision healing well. Will see intermittently. Dr. Cruz aware of UGI results. Continue with GI, and Medicine. Call office with questions.   Review of Systems   Constitutional: Negative for chills and fever.   Respiratory: Negative for cough and shortness of breath.    Gastrointestinal: Positive for abdominal pain. Negative for diarrhea, heartburn, nausea and vomiting.   Skin: Negative for itching and rash.     Hemodynamics:  Temp (24hrs), Av.5 °C (97.7 °F), Min:36.2 °C (97.1 °F), Max:36.8 °C (98.3 °F)  Temperature: 36.7 °C (98 °F)  Pulse  Av.3  Min: 52  Max: 126Heart Rate (Monitored): 64  Blood Pressure : 145/50, NIBP: 100/50     Respiratory:    Respiration: 17, Pulse Oximetry: 98 %     Work Of Breathing / Effort: Shallow  RUL Breath Sounds: Clear, RML Breath Sounds: Clear, RLL Breath Sounds: Diminished, JACQUI Breath Sounds: Clear, LLL Breath Sounds: Diminished  Neuro:  GCS       Fluids:    Intake/Output Summary (Last 24 hours) at 19 1025  Last data filed at 19 0640   Gross per 24 hour   Intake           618.25 ml   Output             1150 ml   Net          -531.75 ml        Current Diet Order   Procedures   • Diet Order Full Liquid     Physical Exam   Constitutional: She is oriented to person, place, and time. She appears well-developed and well-nourished. No distress.   Cardiovascular: Normal rate.    Pulmonary/Chest: Effort normal. No respiratory distress.   Abdominal: Soft. She  exhibits no distension. There is tenderness. There is no rebound and no guarding.   Neurological: She is alert and oriented to person, place, and time.   Skin: Skin is warm and dry. No rash noted. She is not diaphoretic. No erythema.   Psychiatric: She has a normal mood and affect. Her behavior is normal.   Vitals reviewed.    Labs:  Recent Results (from the past 24 hour(s))   CBC WITHOUT DIFFERENTIAL    Collection Time: 01/06/19  3:17 AM   Result Value Ref Range    WBC 7.9 4.8 - 10.8 K/uL    RBC 3.16 (L) 4.20 - 5.40 M/uL    Hemoglobin 9.4 (L) 12.0 - 16.0 g/dL    Hematocrit 29.8 (L) 37.0 - 47.0 %    MCV 94.3 81.4 - 97.8 fL    MCH 29.7 27.0 - 33.0 pg    MCHC 31.5 (L) 33.6 - 35.0 g/dL    RDW 48.0 35.9 - 50.0 fL    Platelet Count 203 164 - 446 K/uL    MPV 10.3 9.0 - 12.9 fL   BASIC METABOLIC PANEL    Collection Time: 01/06/19  3:17 AM   Result Value Ref Range    Sodium 139 135 - 145 mmol/L    Potassium 3.7 3.6 - 5.5 mmol/L    Chloride 105 96 - 112 mmol/L    Co2 26 20 - 33 mmol/L    Glucose 97 65 - 99 mg/dL    Bun 5 (L) 8 - 22 mg/dL    Creatinine 0.73 0.50 - 1.40 mg/dL    Calcium 8.9 8.5 - 10.5 mg/dL    Anion Gap 8.0 0.0 - 11.9   ESTIMATED GFR    Collection Time: 01/06/19  3:17 AM   Result Value Ref Range    GFR If African American >60 >60 mL/min/1.73 m 2    GFR If Non African American >60 >60 mL/min/1.73 m 2     Medical Decision Making, by Problem:  Active Hospital Problems    Diagnosis   • 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 Cruz  S/p ileostomy  Scope 5/2014-no strictures, fistulas, or new abscesses     • DVT (deep venous thrombosis) (HCC) [I82.409]     Priority: Medium   • Pedal edema [R60.0]   • History of MRSA infection [Z86.14]   • History of infection with vancomycin resistant Enterococcus (VRE) [Z86.19]   • Intractable cyclical vomiting with nausea [G43.A1]   • Dysphasia [R47.02]    • Hypokalemia [E87.6]   • DDD (degenerative disc disease), lumbar [M51.36]   • Paroxysmal atrial fibrillation (HCC) [I48.0]   • Chronic pain [G89.29]     On multiple narcotics including high dose oxycodone and Dilaudid  Valium also on medication list     • Opioid type dependence, continuous (CMS-HCC) [F11.20]     Plan:  POD#6, exploratory laparotomy, adhesiolysis, takedown of ileostomy, repair of parastomal hernia with placement of Gentrix xenograft hernia matrix, recreation of new ileostomy, ileoscopy using flexible sigmoidoscope. Pt in PACU s/p EGD and dilation. Incisional abdominal pain, right sided pain. Stool and gas in ostomy appliance.  Groggy. NAD. Breathing unlabored. Abdomen soft. Dressings removed. Staples in place. Incision healing well. Will see intermittently. Dr. Cruz aware of UGI results. Continue with GI, and Medicine. Call office with questions.     Quality Measures:  Quality-Core Measures   Reviewed items::  Labs reviewed and Medications reviewed  Sage catheter::  No Sage  DVT prophylaxis pharmacological::  Heparin  DVT prophylaxis - mechanical:  SCDs  Ulcer Prophylaxis::  Yes      Discussed patient condition with Patient and Dr. Cruz

## 2019-01-06 NOTE — CARE PLAN
Problem: Bowel/Gastric:  Goal: Normal bowel function is maintained or improved  Outcome: PROGRESSING AS EXPECTED  Ostomy is functioning. +flatus, +bowel sounds. Abdomen soft, tender, non distended.  EGD today. Attempt full liquid diet today.      Problem: Pain Management  Goal: Pain level will decrease to patient's comfort goal  Outcome: PROGRESSING AS EXPECTED  Pain controlled with IV dilaudid. Encouraged PO medications. Non-pharmacologic interventions such as heat packs used for breakthrough.  Agreeable to attempt.

## 2019-01-06 NOTE — PROGRESS NOTES
Assumed care of pt.  Alert, oriented. Excited and eager for procedure today.   Pt off floor to pre-op. Received CHG bath. Consents signed and in chart.

## 2019-01-06 NOTE — OR NURSING
The pt is very upset and verbalizes in great length how [facility] is trying to kill her.I have been at bedside listening and consoling.Noe at bedside rom Dr Cruz office.

## 2019-01-06 NOTE — PROGRESS NOTES
Pt back to floor. VSS.  Alert, oriented.   Pain under decent control, medicated in PACU.  Educated to take full liquid diet slow .  Heparin gtt restarted, ordered aPTT for 1700 per Dr. Palmer.   Updated on plan of care. Safety education provided. Bed locked in low. Call light within reach. Rounding in place.

## 2019-01-06 NOTE — PROGRESS NOTES
"Pt raising voice at MD when he discussed weaning IV pain meds and trying PO after procedure. Pt told this RN \"you are trying to blacklist me, you aren't listening to me. I will not swallow liquid pain meds until I know that I can swallow full liquids\". Pt asking to speak with charge RN.   This RN provided reassurance and let her know she should try fulls first as well, pt states she is agreeable but when this RN actually attempts to give med she gets agitated and refuses. Agreed to attempt pudding first and then take PO meds.   Provided pt with pudding and her full liquid lunch tray. Came back to check on pt and 1/2 of the pudding was gone. Asked pt how her swallowing went while eating the pudding and pt states \"I don't remember ever trying pudding\". Encouraged pt to try again and let this RN know.   Charge RN updated.   "

## 2019-01-07 LAB
ANION GAP SERPL CALC-SCNC: 5 MMOL/L (ref 0–11.9)
APTT PPP: 146.4 SEC (ref 24.7–36)
APTT PPP: 52.8 SEC (ref 24.7–36)
APTT PPP: 87.4 SEC (ref 24.7–36)
BUN SERPL-MCNC: 4 MG/DL (ref 8–22)
CALCIUM SERPL-MCNC: 9.3 MG/DL (ref 8.5–10.5)
CHLORIDE SERPL-SCNC: 105 MMOL/L (ref 96–112)
CO2 SERPL-SCNC: 27 MMOL/L (ref 20–33)
CREAT SERPL-MCNC: 0.81 MG/DL (ref 0.5–1.4)
ERYTHROCYTE [DISTWIDTH] IN BLOOD BY AUTOMATED COUNT: 48.6 FL (ref 35.9–50)
GLUCOSE SERPL-MCNC: 93 MG/DL (ref 65–99)
HCT VFR BLD AUTO: 28.6 % (ref 37–47)
HGB BLD-MCNC: 9 G/DL (ref 12–16)
MCH RBC QN AUTO: 29.7 PG (ref 27–33)
MCHC RBC AUTO-ENTMCNC: 31.5 G/DL (ref 33.6–35)
MCV RBC AUTO: 94.4 FL (ref 81.4–97.8)
PATHOLOGY CONSULT NOTE: NORMAL
PLATELET # BLD AUTO: 191 K/UL (ref 164–446)
PMV BLD AUTO: 10.4 FL (ref 9–12.9)
POTASSIUM SERPL-SCNC: 3.5 MMOL/L (ref 3.6–5.5)
RBC # BLD AUTO: 3.03 M/UL (ref 4.2–5.4)
SODIUM SERPL-SCNC: 137 MMOL/L (ref 135–145)
WBC # BLD AUTO: 6.8 K/UL (ref 4.8–10.8)

## 2019-01-07 PROCEDURE — 36415 COLL VENOUS BLD VENIPUNCTURE: CPT

## 2019-01-07 PROCEDURE — 99233 SBSQ HOSP IP/OBS HIGH 50: CPT | Performed by: INTERNAL MEDICINE

## 2019-01-07 PROCEDURE — A9270 NON-COVERED ITEM OR SERVICE: HCPCS | Performed by: INTERNAL MEDICINE

## 2019-01-07 PROCEDURE — 700111 HCHG RX REV CODE 636 W/ 250 OVERRIDE (IP): Performed by: HOSPITALIST

## 2019-01-07 PROCEDURE — 665999 HH PPS REVENUE DEBIT

## 2019-01-07 PROCEDURE — 85027 COMPLETE CBC AUTOMATED: CPT

## 2019-01-07 PROCEDURE — 92526 ORAL FUNCTION THERAPY: CPT

## 2019-01-07 PROCEDURE — 700111 HCHG RX REV CODE 636 W/ 250 OVERRIDE (IP): Performed by: INTERNAL MEDICINE

## 2019-01-07 PROCEDURE — 85730 THROMBOPLASTIN TIME PARTIAL: CPT | Mod: 91

## 2019-01-07 PROCEDURE — 665998 HH PPS REVENUE CREDIT

## 2019-01-07 PROCEDURE — 700102 HCHG RX REV CODE 250 W/ 637 OVERRIDE(OP): Performed by: NURSE PRACTITIONER

## 2019-01-07 PROCEDURE — 700102 HCHG RX REV CODE 250 W/ 637 OVERRIDE(OP): Performed by: INTERNAL MEDICINE

## 2019-01-07 PROCEDURE — 770006 HCHG ROOM/CARE - MED/SURG/GYN SEMI*

## 2019-01-07 PROCEDURE — 99222 1ST HOSP IP/OBS MODERATE 55: CPT | Performed by: PSYCHIATRY & NEUROLOGY

## 2019-01-07 PROCEDURE — A9270 NON-COVERED ITEM OR SERVICE: HCPCS | Performed by: NURSE PRACTITIONER

## 2019-01-07 PROCEDURE — 90833 PSYTX W PT W E/M 30 MIN: CPT | Performed by: PSYCHIATRY & NEUROLOGY

## 2019-01-07 PROCEDURE — 80048 BASIC METABOLIC PNL TOTAL CA: CPT

## 2019-01-07 RX ORDER — FUROSEMIDE 20 MG/1
20 TABLET ORAL
Status: DISCONTINUED | OUTPATIENT
Start: 2019-01-07 | End: 2019-01-08 | Stop reason: HOSPADM

## 2019-01-07 RX ADMIN — Medication 10 MG: at 09:16

## 2019-01-07 RX ADMIN — Medication 10 MG: at 14:55

## 2019-01-07 RX ADMIN — ONDANSETRON HYDROCHLORIDE 4 MG: 2 INJECTION, SOLUTION INTRAMUSCULAR; INTRAVENOUS at 12:16

## 2019-01-07 RX ADMIN — ESTRADIOL 0.5 G: 0.1 CREAM VAGINAL at 20:10

## 2019-01-07 RX ADMIN — FUROSEMIDE 20 MG: 20 TABLET ORAL at 16:44

## 2019-01-07 RX ADMIN — HYDROMORPHONE HYDROCHLORIDE 0.5 MG: 1 INJECTION, SOLUTION INTRAMUSCULAR; INTRAVENOUS; SUBCUTANEOUS at 07:48

## 2019-01-07 RX ADMIN — PROCHLORPERAZINE EDISYLATE 10 MG: 5 INJECTION INTRAMUSCULAR; INTRAVENOUS at 16:43

## 2019-01-07 RX ADMIN — METOPROLOL TARTRATE 25 MG: 25 TABLET, FILM COATED ORAL at 09:18

## 2019-01-07 RX ADMIN — HYDROMORPHONE HYDROCHLORIDE 0.5 MG: 1 INJECTION, SOLUTION INTRAMUSCULAR; INTRAVENOUS; SUBCUTANEOUS at 12:16

## 2019-01-07 RX ADMIN — HYDROMORPHONE HYDROCHLORIDE 0.5 MG: 1 INJECTION, SOLUTION INTRAMUSCULAR; INTRAVENOUS; SUBCUTANEOUS at 03:47

## 2019-01-07 RX ADMIN — PROCHLORPERAZINE EDISYLATE 10 MG: 5 INJECTION INTRAMUSCULAR; INTRAVENOUS at 07:48

## 2019-01-07 RX ADMIN — HYDROMORPHONE HYDROCHLORIDE 0.5 MG: 1 INJECTION, SOLUTION INTRAMUSCULAR; INTRAVENOUS; SUBCUTANEOUS at 16:43

## 2019-01-07 RX ADMIN — HYDROMORPHONE HYDROCHLORIDE 0.5 MG: 1 INJECTION, SOLUTION INTRAMUSCULAR; INTRAVENOUS; SUBCUTANEOUS at 21:27

## 2019-01-07 RX ADMIN — HYDROMORPHONE HYDROCHLORIDE 0.5 MG: 1 INJECTION, SOLUTION INTRAMUSCULAR; INTRAVENOUS; SUBCUTANEOUS at 00:03

## 2019-01-07 RX ADMIN — ONDANSETRON HYDROCHLORIDE 4 MG: 2 INJECTION, SOLUTION INTRAMUSCULAR; INTRAVENOUS at 19:06

## 2019-01-07 RX ADMIN — METOPROLOL TARTRATE 25 MG: 25 TABLET, FILM COATED ORAL at 16:43

## 2019-01-07 RX ADMIN — PROCHLORPERAZINE EDISYLATE 10 MG: 5 INJECTION INTRAMUSCULAR; INTRAVENOUS at 00:08

## 2019-01-07 RX ADMIN — RIVAROXABAN 15 MG: 15 TABLET, FILM COATED ORAL at 16:43

## 2019-01-07 RX ADMIN — HEPARIN SODIUM 3200 UNITS: 1000 INJECTION, SOLUTION INTRAVENOUS; SUBCUTANEOUS at 14:48

## 2019-01-07 RX ADMIN — ONDANSETRON HYDROCHLORIDE 4 MG: 2 INJECTION, SOLUTION INTRAMUSCULAR; INTRAVENOUS at 06:07

## 2019-01-07 RX ADMIN — Medication 10 MG: at 19:03

## 2019-01-07 RX ADMIN — POTASSIUM BICARBONATE 25 MEQ: 25 TABLET, EFFERVESCENT ORAL at 16:44

## 2019-01-07 ASSESSMENT — ENCOUNTER SYMPTOMS
ROS GI COMMENTS: BELCHING
CHILLS: 0
PALPITATIONS: 0
ABDOMINAL PAIN: 1
SPEECH CHANGE: 0
NAUSEA: 1
VOMITING: 0
DOUBLE VISION: 0
COUGH: 0
TINGLING: 0
BLOOD IN STOOL: 0
SHORTNESS OF BREATH: 0
NERVOUS/ANXIOUS: 1
CONSTIPATION: 0
SENSORY CHANGE: 0
WEAKNESS: 1
SORE THROAT: 0
HEADACHES: 0
TREMORS: 0
FEVER: 0
DIZZINESS: 0
BLURRED VISION: 0
DIARRHEA: 0
MYALGIAS: 1
FOCAL WEAKNESS: 0

## 2019-01-07 ASSESSMENT — PAIN SCALES - GENERAL
PAINLEVEL_OUTOF10: ASSUMED PAIN PRESENT
PAINLEVEL_OUTOF10: 9
PAINLEVEL_OUTOF10: 8
PAINLEVEL_OUTOF10: 9
PAINLEVEL_OUTOF10: 4
PAINLEVEL_OUTOF10: 8
PAINLEVEL_OUTOF10: 6
PAINLEVEL_OUTOF10: 8
PAINLEVEL_OUTOF10: 8
PAINLEVEL_OUTOF10: 7

## 2019-01-07 NOTE — PROGRESS NOTES
Gastroenterology Progress Note     Author: Jonathan Rios   Date & Time Created: 1/7/2019 10:00 AM    Chief Complaint:  Dysphagia    Interval History:  1/7/2019: Had EGD yesterday showing possible spastic distal esophagus and tight GE junction.  Dilated to 51F.  Mucosa appeared normal without upper GI Crohns.  Notes slightly improved swallowing and able to tolerate pills.  Notes belching since procedure.  She was pureeing all foods at home prior to admit and has had dysphagia chronically as well as chronic abdominal pain    Review of Systems:  Review of Systems   Constitutional: Negative for chills and fever.   Gastrointestinal: Positive for abdominal pain and nausea. Negative for blood in stool, constipation, diarrhea, melena and vomiting.        Belching       Physical Exam:  Physical Exam   Constitutional: She is oriented to person, place, and time. She appears well-developed and well-nourished.   Abdominal: Soft. Bowel sounds are normal. She exhibits no distension. There is tenderness. There is no rebound and no guarding.   Midline incision with staples, clean/dry/intact   Neurological: She is alert and oriented to person, place, and time.   Nursing note and vitals reviewed.      Labs:          Recent Labs      01/05/19   0003  01/06/19   0317  01/07/19   0339   SODIUM  136  139  137   POTASSIUM  3.3*  3.7  3.5*   CHLORIDE  101  105  105   CO2  25  26  27   BUN  6*  5*  4*   CREATININE  0.99  0.73  0.81   MAGNESIUM  1.3*   --    --    CALCIUM  9.7  8.9  9.3     Recent Labs      01/05/19   0003  01/06/19   0317  01/07/19   0339   GLUCOSE  101*  97  93     Recent Labs      01/05/19   0003   01/06/19   0317  01/06/19   1659  01/06/19   2355  01/07/19   0339  01/07/19   0730   RBC  3.60*   --   3.16*   --    --   3.03*   --    HEMOGLOBIN  10.9*   --   9.4*   --    --   9.0*   --    HEMATOCRIT  32.9*   --   29.8*   --    --   28.6*   --    PLATELETCT  238   --   203   --    --   191   --    APTT  55.2*   < >    --   54.9*  146.4*   --   87.4*    < > = values in this interval not displayed.     Recent Labs      19   0003  19   0317  19   0339   WBC  11.2*  7.9  6.8   NEUTSPOLYS  70.40   --    --    LYMPHOCYTES  18.60*   --    --    MONOCYTES  8.20   --    --    EOSINOPHILS  2.00   --    --    BASOPHILS  0.40   --    --      Hemodynamics:  Temp (24hrs), Av.6 °C (97.8 °F), Min:36.2 °C (97.1 °F), Max:37.2 °C (98.9 °F)  Temperature: 36.4 °C (97.6 °F)  Pulse  Av.6  Min: 52  Max: 126Heart Rate (Monitored): 70  Blood Pressure : 149/74, NIBP: 109/52     Respiratory:    Respiration: 18, Pulse Oximetry: 97 %     Work Of Breathing / Effort: Shallow  RUL Breath Sounds: Clear, RML Breath Sounds: Clear, RLL Breath Sounds: Diminished, JACQUI Breath Sounds: Clear, LLL Breath Sounds: Diminished  Fluids:    Intake/Output Summary (Last 24 hours) at 19 1000  Last data filed at 19 0900   Gross per 24 hour   Intake              920 ml   Output              700 ml   Net              220 ml        GI/Nutrition:  Orders Placed This Encounter   Procedures   • Diet Order Full Liquid (can you please send soup in a coffee cup for patient comfort. No tomato soup, no cranberry juice, no peach marc, no wheat products, no almond milk, no coffee, prefers choboni yogurt smoothies)     Standing Status:   Standing     Number of Occurrences:   1     Order Specific Question:   Diet:     Answer:   Full Liquid [11]     Comments:   can you please send soup in a coffee cup for patient comfort. No tomato soup, no cranberry juice, no peach marc, no wheat products, no almond milk, no coffee, prefers choboni yogurt smoothies     Order Specific Question:   Consistency/Fluid modifications:     Answer:   Nectar Thick [2]     Medical Decision Making, by Problem:  Active Hospital Problems    Diagnosis   • *Intractable cyclical vomiting with nausea [G43.A1]   • 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HCC) [K94.13]   • Crohn's disease of small intestine with  complication ([facility]) [K50.019]   • DVT (deep venous thrombosis) ([facility]) [I82.409]   • Pedal edema [R60.0]   • History of MRSA infection [Z86.14]   • History of infection with vancomycin resistant Enterococcus (VRE) [Z86.19]   • Dysphasia [R47.02]   • Hypokalemia [E87.6]   • DDD (degenerative disc disease), lumbar [M51.36]   • Paroxysmal atrial fibrillation (HCC) [I48.0]   • Chronic pain [G89.29]   • Opioid type dependence, continuous ([facility]) [F11.20]     PROBLEMS:  1. Dysphagia.  Chronic.  Barium esophagram with possible spastic esophagus and delayed emptying of contrast out of esophagus.  EGD with suspected spastic esophagus and tight LES but normal esophageal mucosa.  Suspect dysmotility disorder, ?achalasia versus ineffective motility from narcotics versus spastic esophagus.  Slight improvement post-esophageal dilation  2. Crohns disease s/p colectomy with ileostomy and recent ileostomy revision  3. Chronic abdominal pain on narcotics  4. DVT on heparin drip     Plan:  1. Advance to pureed diet and see how she tolerates.  This was baseline diet at home  2. Discussed outpatient esophageal manometry  3. Would hold on Botox until manometry can be performed  4. If unable to tolerate pureed diet post-esophageal dilation, then would start nitrate such as Isordil for esophageal dysmotilit  5. Discussed need to minimize narcotics which is likely making swallowing worse    Quality-Core Measures

## 2019-01-07 NOTE — CARE PLAN
Problem: Bowel/Gastric:  Goal: Normal bowel function is maintained or improved  Outcome: PROGRESSING AS EXPECTED  Ostomy is functioning. +flatus, +bowel sounds. Abdomen soft, tender, non distended.  Diet advanced to a pureed/blended and thinned out diet so pt can have a taste of regular foods. Tolerated swallowing metoprolol whole floated in a magic cup.     Problem: Pain Management  Goal: Pain level will decrease to patient's comfort goal  Outcome: PROGRESSING AS EXPECTED  Agreeable to take PO oxy today. Requesting dilaudid between oxy. Providing continuous education. Not very receptive to education.

## 2019-01-07 NOTE — THERAPY
"Speech Language Therapy dysphagia treatment completed.   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 LES is quite spastic.\"  Patient awake, alert and agreeable to therapy.  She is currently cleared for full liquids, but is asking for purees and more proteins. PO trials consisted of nectar thick liquids, thin liquids and small amounts of Magic Cup.  Patient continues to use a chin tuck posture and often triggers 2-3 swallows before she feels bolus is cleared.  She was given a small Metoprolol pill by RN, and patient took with NTL--she felt that it stuck after the initial swallow and needed 2 more NTL boluses and 2 bites of Magic Cup before she felt as though the small pill was down.  Discussed possibility of having these small pills (not able to crush) mixed in with her applesauce or Magic Cup to see if that is any better.  Patient indicated she would try that the next time. Furthermore, she reports that since her EGD, she has had more burping and more foamy secretions, and reports she does not feel as though she is any better.  She had no overt S/Sx of aspiration on any of these textures tested today, but given continued c/o globus and difficulty with motility, there is concern for advancement in diet.  She is at high risk for ascending aspiration given esophageal issues.  I did discuss possibility of a jaw wired pureed diet as an option as the purees are thinned out enough to be drank through a straw, and thus would be less likely to \"stick\" in her esophagus.  She was willing to try--RN to discuss with GI when he comes up.  At this juncture, would recommend consideration of a jaw wired pureed diet with nectar thick liquids--OK for patient to have sips of thin liquids as tolerated.  SLP will continue to follow along.    Recommendations: " "Jaw wired pureed diet with nectar thick liquids and sips of thin liquids as tolerated once cleared by GI    Plan of Care: Will benefit from Speech Therapy 3 times per week  Post-Acute Therapy: Discharge to home with outpatient or home health for additional skilled therapy services.    See \"Rehab Therapy-Acute\" Patient Summary Report for complete documentation.     "

## 2019-01-07 NOTE — PROGRESS NOTES
Had a conversation with patient as she asked to speak to charge nurse.  Patient expressed her concerns with having a semi private room and HIPAA, feels that she should be able to determine herself when she is ready to start on oral medications, which she states she will start tomorrow and that she should not have to ask what her vitals are that the CNA should automatically tell her. Listened to her concerns and addressed each one. Patient felt very comfortable with conversation. Stated that I would follow up tomorrow.

## 2019-01-07 NOTE — CARE PLAN
Problem: Nutritional:  Goal: Achieve adequate nutritional intake  Patient will consume >50% of meals   Outcome: MET Date Met: 01/07/19  PO diet resumed; intake >50% for most recent four meals.

## 2019-01-07 NOTE — PROGRESS NOTES
Pt A&Ox4, sleeping this AM.  Tolerating full liquids, asking about possibly eating mashed potatoes today. Agreeable to take liquid oxy after this next dose of dilaudid.   Midline incision, MAXX well approximated. L ostomy, +output, stoma moist and pink.  Pt c/o occasional n/v. + bowel sounds.   BLE swelling. Heparin gtt in use. APTT drawn.   Lung sounds clear to diminished. Saturating >90% on RA.  Pt ambulates independently steady gait.  Updated on plan of care. Safety education provided. Bed locked in low. Call light within reach. Rounding in place.

## 2019-01-07 NOTE — PROGRESS NOTES
Pt A&O x4. Anxious, restless, verbal.     Pt rates pain 8 out of 10. Medicated for pain. Provided more heat packs.     Neuro: LEÓN. Denies new onset of numbness/ tingling. Baseline neuropathy in BLE.     Cardiac: Denies new onset of chest pain. Hx of Afib, pt refusing to swallow scheduled Metoprolol at this time.    Vascular: Pulses 2+ BUE, BLE. 2+ pitting edema noted in BLE.    Respiratory: Lungs sound diminished in bases. On RA. Denies SOB.    GI: Abdomen soft, tender. + bowel sounds, + flatus, + output into LLQ ileostomy. Appliance intact, no leaks noted. On full liquid nectar thick diet, not tolerating. Pt states it is too hard to swallow and chooses to not swallow anything. - nausea/ vomiting, however pt asks to be medicated prophylactically with antiemetics with every IV pain medication administration. NPO at midnight.     : Pt voiding adequately.      MSK: Pt up to bathroom self, no assistance required, tolerating well. Ambulating halls self.     Integumentary: Midline abdominal surgical incision CDI, approximated, staples, scabbing. Pt requested to have staples covered with dressing to prevent staples from snagging on ostomy appliance bag. Peristomal skin intact, pink. Swelling noted to BLE.     Labs noted. On Heparin drip, last APTT therapeutic, rate verified. Will stop drip at 0200 per active order in anticipation of pt's scheduled procedure.     Fall precautions in place: Bed locked in lowest position, Upper bed rails up, treaded socks in place, personal belongings within reach, call light within reach, appropriate mobility signs in place, - bed alarm. Pt calls appropriately.     Pt updated on POC.

## 2019-01-07 NOTE — CARE PLAN
"Problem: Bowel/Gastric:  Goal: Will not experience complications related to bowel motility  Outcome: PROGRESSING SLOWER THAN EXPECTED  Educated patient upon opioid risks with bowel motility. Patient verbalized desire to increase utilization despite risks. Patient verbalized soup \"caused diarrhea\". Loose/watery effluent noted. Patient expressed desire to be returned to pureed diet since \"mashed potatoes were perfect.\"     Problem: Skin Integrity  Goal: Risk for impaired skin integrity will decrease  Outcome: PROGRESSING SLOWER THAN EXPECTED  Patient expressed increased weakness. Patient encouraged to turn d/t reported sacral tenderness. Refused stated, \"it wouldn't do any good anyway.\" Educated upon risks. BLE elevated upon pillows.       "

## 2019-01-07 NOTE — PROGRESS NOTES
"Pt ate an entire cup of cream of chicken soup without difficulties, tolerated well.   Pt is anxious to try PO meds. States \"I will tomorrow.\"  "

## 2019-01-08 VITALS
BODY MASS INDEX: 22.87 KG/M2 | HEART RATE: 74 BPM | WEIGHT: 168.87 LBS | TEMPERATURE: 98.4 F | OXYGEN SATURATION: 95 % | HEIGHT: 72 IN | DIASTOLIC BLOOD PRESSURE: 54 MMHG | SYSTOLIC BLOOD PRESSURE: 102 MMHG | RESPIRATION RATE: 18 BRPM

## 2019-01-08 PROBLEM — R60.0 PEDAL EDEMA: Status: RESOLVED | Noted: 2019-01-03 | Resolved: 2019-01-08

## 2019-01-08 PROBLEM — E87.6 HYPOKALEMIA: Status: RESOLVED | Noted: 2018-12-13 | Resolved: 2019-01-08

## 2019-01-08 LAB
ANION GAP SERPL CALC-SCNC: 6 MMOL/L (ref 0–11.9)
BUN SERPL-MCNC: 5 MG/DL (ref 8–22)
CALCIUM SERPL-MCNC: 8.9 MG/DL (ref 8.5–10.5)
CHLORIDE SERPL-SCNC: 104 MMOL/L (ref 96–112)
CO2 SERPL-SCNC: 28 MMOL/L (ref 20–33)
CREAT SERPL-MCNC: 0.78 MG/DL (ref 0.5–1.4)
ERYTHROCYTE [DISTWIDTH] IN BLOOD BY AUTOMATED COUNT: 50.4 FL (ref 35.9–50)
GLUCOSE SERPL-MCNC: 98 MG/DL (ref 65–99)
HCT VFR BLD AUTO: 27.7 % (ref 37–47)
HGB BLD-MCNC: 8.9 G/DL (ref 12–16)
MCH RBC QN AUTO: 30.5 PG (ref 27–33)
MCHC RBC AUTO-ENTMCNC: 32.1 G/DL (ref 33.6–35)
MCV RBC AUTO: 94.9 FL (ref 81.4–97.8)
PLATELET # BLD AUTO: 195 K/UL (ref 164–446)
PMV BLD AUTO: 10.5 FL (ref 9–12.9)
POTASSIUM SERPL-SCNC: 3.8 MMOL/L (ref 3.6–5.5)
RBC # BLD AUTO: 2.92 M/UL (ref 4.2–5.4)
SODIUM SERPL-SCNC: 138 MMOL/L (ref 135–145)
WBC # BLD AUTO: 6.8 K/UL (ref 4.8–10.8)
ZINC BLD-MCNC: 606.8 UG/DL (ref 440–860)

## 2019-01-08 PROCEDURE — 700111 HCHG RX REV CODE 636 W/ 250 OVERRIDE (IP): Performed by: HOSPITALIST

## 2019-01-08 PROCEDURE — 700105 HCHG RX REV CODE 258

## 2019-01-08 PROCEDURE — 665998 HH PPS REVENUE CREDIT

## 2019-01-08 PROCEDURE — 85027 COMPLETE CBC AUTOMATED: CPT

## 2019-01-08 PROCEDURE — 700102 HCHG RX REV CODE 250 W/ 637 OVERRIDE(OP): Performed by: NURSE PRACTITIONER

## 2019-01-08 PROCEDURE — A9270 NON-COVERED ITEM OR SERVICE: HCPCS | Performed by: NURSE PRACTITIONER

## 2019-01-08 PROCEDURE — A9270 NON-COVERED ITEM OR SERVICE: HCPCS | Performed by: INTERNAL MEDICINE

## 2019-01-08 PROCEDURE — 700102 HCHG RX REV CODE 250 W/ 637 OVERRIDE(OP): Performed by: INTERNAL MEDICINE

## 2019-01-08 PROCEDURE — 700111 HCHG RX REV CODE 636 W/ 250 OVERRIDE (IP): Performed by: INTERNAL MEDICINE

## 2019-01-08 PROCEDURE — 665999 HH PPS REVENUE DEBIT

## 2019-01-08 PROCEDURE — 99239 HOSP IP/OBS DSCHRG MGMT >30: CPT | Performed by: FAMILY MEDICINE

## 2019-01-08 PROCEDURE — 80048 BASIC METABOLIC PNL TOTAL CA: CPT

## 2019-01-08 RX ORDER — PROCHLORPERAZINE MALEATE 10 MG
10 TABLET ORAL EVERY 6 HOURS PRN
Qty: 50 TAB | Refills: 0 | Status: SHIPPED | OUTPATIENT
Start: 2019-01-08 | End: 2019-05-19

## 2019-01-08 RX ORDER — SODIUM CHLORIDE 9 MG/ML
INJECTION, SOLUTION INTRAVENOUS
Status: COMPLETED
Start: 2019-01-08 | End: 2019-01-08

## 2019-01-08 RX ORDER — FUROSEMIDE 20 MG/1
20 TABLET ORAL DAILY
Qty: 30 TAB | Refills: 0 | Status: SHIPPED | OUTPATIENT
Start: 2019-01-09 | End: 2019-06-01

## 2019-01-08 RX ADMIN — ONDANSETRON HYDROCHLORIDE 4 MG: 2 INJECTION, SOLUTION INTRAMUSCULAR; INTRAVENOUS at 12:06

## 2019-01-08 RX ADMIN — Medication 10 MG: at 00:31

## 2019-01-08 RX ADMIN — POTASSIUM BICARBONATE 25 MEQ: 25 TABLET, EFFERVESCENT ORAL at 04:49

## 2019-01-08 RX ADMIN — METOPROLOL TARTRATE 25 MG: 25 TABLET, FILM COATED ORAL at 04:50

## 2019-01-08 RX ADMIN — FUROSEMIDE 20 MG: 20 TABLET ORAL at 04:50

## 2019-01-08 RX ADMIN — SODIUM CHLORIDE: 9 INJECTION, SOLUTION INTRAVENOUS at 04:45

## 2019-01-08 RX ADMIN — HYDROMORPHONE HYDROCHLORIDE 0.5 MG: 1 INJECTION, SOLUTION INTRAMUSCULAR; INTRAVENOUS; SUBCUTANEOUS at 06:34

## 2019-01-08 RX ADMIN — PROCHLORPERAZINE EDISYLATE 10 MG: 5 INJECTION INTRAMUSCULAR; INTRAVENOUS at 09:44

## 2019-01-08 RX ADMIN — RIVAROXABAN 15 MG: 15 TABLET, FILM COATED ORAL at 08:26

## 2019-01-08 RX ADMIN — HYDROMORPHONE HYDROCHLORIDE 0.5 MG: 1 INJECTION, SOLUTION INTRAMUSCULAR; INTRAVENOUS; SUBCUTANEOUS at 11:38

## 2019-01-08 RX ADMIN — ONDANSETRON HYDROCHLORIDE 4 MG: 2 INJECTION, SOLUTION INTRAMUSCULAR; INTRAVENOUS at 06:33

## 2019-01-08 RX ADMIN — Medication 10 MG: at 04:50

## 2019-01-08 RX ADMIN — HYDROMORPHONE HYDROCHLORIDE 0.5 MG: 1 INJECTION, SOLUTION INTRAMUSCULAR; INTRAVENOUS; SUBCUTANEOUS at 02:33

## 2019-01-08 RX ADMIN — PROCHLORPERAZINE EDISYLATE 10 MG: 5 INJECTION INTRAMUSCULAR; INTRAVENOUS at 02:33

## 2019-01-08 ASSESSMENT — ENCOUNTER SYMPTOMS
NAUSEA: 1
ABDOMINAL PAIN: 1
VOMITING: 0
ROS GI COMMENTS: BELCHING
BLOOD IN STOOL: 0
FEVER: 0
CONSTIPATION: 0
CHILLS: 0
DIARRHEA: 0

## 2019-01-08 ASSESSMENT — PAIN SCALES - GENERAL
PAINLEVEL_OUTOF10: 7
PAINLEVEL_OUTOF10: 8
PAINLEVEL_OUTOF10: 9
PAINLEVEL_OUTOF10: 7

## 2019-01-08 NOTE — PROGRESS NOTES
Discharging Patient home per physician order.  Discharged with spouse.  Demonstrated understanding of discharge instructions, follow up appointments, home medications, prescriptions, home care for surgical wound, and nursing care instructions for peristomal hernia repair.  Ambulating without assistance, voiding without difficulty, pain well controlled, tolerating oral medications, oxygen saturation greater than 90% , tolerating diet.   Educational handouts given and discussed.  Verbalized understanding of discharge instructions and educational handouts.  All questions answered.  Belongings with patient at time of discharge.    Patient medications returned.  Estrace cream, Systane eye drops and oxycodone oral solution returned to patient.

## 2019-01-08 NOTE — PROGRESS NOTES
"Received report from AM RN; assumed care. A&O x 4. VSS. 97% on RA. Patient denies SOB, new onset numbness, tingling. Medicated for pain/nausea; see MAR. Patient ambulated around unit at beginning of shift. CNA/RN witnessed, up self with steady gait. Pain management discussed; pt stated she's \"trying to do only liquid oxycodone, but made prior arrangements for IV dilaudid\". Complied with request. Patient stated she's trying to space out her pain medication. Abdomen soft, tender. Midline incision CDI, approximated with staples/scabbing. Pinkish/red periskin noted to midline. Patient reported itching at the site. Patient requested for staples to be removed; informed patient an order was needed. + normoactive bowel sounds x 4, + flatus, + output into LLQ ileostomy. Appliance intact, no leaks noted. Patient tolerating diet, stated she's never had trouble, but \"burping\" a lot more with the procedure.  BLE 1+ pitting edema noted. Educated patient upon increased ambulation to improve edema; verbalized understanding. Patient stated would work with her PT once she gets home. Patient tolerating switch to Xarelto without adverse side effects noted/reported. Bed locked/lowest position. Call light/belongings within reach. All needs met at present time.    "

## 2019-01-08 NOTE — PROGRESS NOTES
"Assumed care of patient from night shift RN.  Patient is alert and oriented times 4, states pain of 7/10, declines intervention at this time.  VSS /57   Pulse 79   Temp 36.6 °C (97.9 °F) (Temporal)   Resp 16   Ht 1.829 m (6' 0.01\")   Wt 76.6 kg (168 lb 14 oz)   SpO2 90%   Breastfeeding? No   BMI 22.90 kg/m²   PIV in the RUE, patient and saline locked.  PIV in the LAC, patent and saline locked.  On RA with saturations in the mid 90s.  Ileostomy to the LLQ, loose stool.  Urinating without difficulty.  Regular jaw wired diet, Dys I pureed, tolerating well.  (Jaw not wired, patient with swallowing difficulties).  Midline incision with staples to be removed today.  Patient is up self, demonstrates steady gait, ambulates frequently.  POC discussed for the day, possible discharge home.  Bed is locked and in the lowest position, francesca light is within reach.  All needs are met at this time, hourly rounding is in place.  "

## 2019-01-08 NOTE — CARE PLAN
Problem: Safety  Goal: Will remain free from injury  Outcome: PROGRESSING AS EXPECTED  Patient educated to dangle at edge of bed prior to getting up.    Problem: Pain Management  Goal: Pain level will decrease to patient's comfort goal  Outcome: PROGRESSING AS EXPECTED  Had long discussion with patient in regards to pain medication, advised that she needs to avoid IV meds in order to go home.

## 2019-01-08 NOTE — PROGRESS NOTES
Pt ate around 50-75% of liquid pureed diet today and tolerated well. Requested seconds of foods she liked and ate the whole cup of soup.  Pt tolerated whole pills floated in pudding. Able to administer xarelto, heparin gtt d/c'd.

## 2019-01-08 NOTE — CARE PLAN
Problem: Discharge Barriers/Planning  Goal: Patient's continuum of care needs will be met  Outcome: PROGRESSING AS EXPECTED  Discussion regarding potential discharge/POC discussed. Verbalized understanding. Patient verbalized motivation to go home.     Problem: Skin Integrity  Goal: Risk for impaired skin integrity will decrease  Outcome: PROGRESSING AS EXPECTED  Increased ambulation, turning in bed, repositioning with extra pillows, molina cream for sacrum/buttocks encouraged/provided. Patient complying with requests.

## 2019-01-08 NOTE — DISCHARGE INSTRUCTIONS
Discharge Instructions    Discharged to home by car with relative. Discharged via wheelchair, hospital escort: Refused.  Special equipment needed: Not Applicable    Be sure to schedule a follow-up appointment with your primary care doctor or any specialists as instructed.     Discharge Plan:   Influenza Vaccine Indication: Not indicated: Previously immunized this influenza season and > 8 years of age    I understand that a diet low in cholesterol, fat, and sodium is recommended for good health. Unless I have been given specific instructions below for another diet, I accept this instruction as my diet prescription.   Other diet: liquid as tolerated    Special Instructions: None    · Is patient discharged on Warfarin / Coumadin?   No     Depression / Suicide Risk    As you are discharged from this [facility] facility, it is important to learn how to keep safe from harming yourself.    Recognize the warning signs:  · Abrupt changes in personality, positive or negative- including increase in energy   · Giving away possessions  · Change in eating patterns- significant weight changes-  positive or negative  · Change in sleeping patterns- unable to sleep or sleeping all the time   · Unwillingness or inability to communicate  · Depression  · Unusual sadness, discouragement and loneliness  · Talk of wanting to die  · Neglect of personal appearance   · Rebelliousness- reckless behavior  · Withdrawal from people/activities they love  · Confusion- inability to concentrate     If you or a loved one observes any of these behaviors or has concerns about self-harm, here's what you can do:  · Talk about it- your feelings and reasons for harming yourself  · Remove any means that you might use to hurt yourself (examples: pills, rope, extension cords, firearm)  · Get professional help from the community (Mental Health, Substance Abuse, psychological counseling)  · Do not be alone:Call your Safe Contact- someone whom you trust who  will be there for you.  · Call your local CRISIS HOTLINE 045-6985 or 313-691-8049  · Call your local Children's Mobile Crisis Response Team [place] (601) 905-6150 or www.Manflu  · Call the toll free National Suicide Prevention Hotlines   · National Suicide Prevention Lifeline 213-893-XTUQ (7653)  · National Hope Line Network 800-SUICIDE (730-0312)    Open Hernia Repair, Care After  Refer to this sheet in the next few weeks. These instructions provide you with information on caring for yourself after your procedure. Your health care provider may also give you more specific instructions. Your treatment has been planned according to current medical practices, but problems sometimes occur. Call your health care provider if you have any problems or questions after your procedure.  WHAT TO EXPECT AFTER THE PROCEDURE  After your procedure, it is typical to have the following:  · Pain in your abdomen, especially along your incision. You will be given pain medicines to control the pain.  · Constipation. You may be given a stool softener to help prevent this.  HOME CARE INSTRUCTIONS  · Only take over-the-counter or prescription medicines as directed by your health care provider.  · Keep the incision area dry and clean. You may wash the incision area gently with soap and water 48 hours after surgery. Gently blot or dab the incision area dry. Do not take baths, use swimming pools, or use hot tubs for 10 days or until your health care provider approves.  · Change bandages (dressings) as directed by your health care provider.  · Continue your normal diet as directed by your health care provider. Eat plenty of fruits and vegetables to help prevent constipation.  · Drink enough fluids to keep your urine clear or pale yellow. This also helps prevent constipation.  · Do not drive until your health care provider says it is okay.  · Do not lift anything heavier than 10 lb (4.5 kg) or play contact sports for 4 weeks or  until your health care provider approves.  · Follow up with your health care provider as directed. Ask your health care provider when to make an appointment to have your stitches (sutures) or staples removed.  SEEK MEDICAL CARE IF:  · You have increased bleeding coming from the incision site.  · You have blood in your stool.  · You have increasing pain in the incision area.  · You see redness or swelling in the incision area.  · You have fluid (pus) coming from the incision.  · You have a fever.  · You notice a bad smell coming from the incision area or dressing.  SEEK IMMEDIATE MEDICAL CARE IF:  · You develop a rash.  · You have chest pain or shortness of breath.  · You feel lightheaded or feel faint.     This information is not intended to replace advice given to you by your health care provider. Make sure you discuss any questions you have with your health care provider.     Document Released: 07/07/2006 Document Revised: 01/08/2016 Document Reviewed: 07/30/2014  [email or web address] Interactive Patient Education ©2016 [employer]

## 2019-01-08 NOTE — PROGRESS NOTES
Gastroenterology Progress Note     Author: Jonathan Rios   Date & Time Created: 1/8/2019 11:51 AM    Chief Complaint:  Dysphagia    Interval History:  1/7/2019: Had EGD yesterday showing possible spastic distal esophagus and tight GE junction.  Dilated to 51F.  Mucosa appeared normal without upper GI Crohns.  Notes slightly improved swallowing and able to tolerate pills.  Notes belching since procedure.  She was pureeing all foods at home prior to admit and has had dysphagia chronically as well as chronic abdominal pain  1/8/2019: Tolerating pureed/liquid diet.  Still with lower ab pain.  Ready to go home.    Review of Systems:  Review of Systems   Constitutional: Negative for chills and fever.   Gastrointestinal: Positive for abdominal pain and nausea. Negative for blood in stool, constipation, diarrhea, melena and vomiting.        Belching       Physical Exam:  Physical Exam   Constitutional: She is oriented to person, place, and time. She appears well-developed and well-nourished.   Abdominal: Soft. Bowel sounds are normal. She exhibits no distension. There is tenderness. There is no rebound and no guarding.   Midline incision with staples, clean/dry/intact   Neurological: She is alert and oriented to person, place, and time.   Nursing note and vitals reviewed.      Labs:          Recent Labs      01/06/19 0317 01/07/19   0339  01/08/19   0430   SODIUM  139  137  138   POTASSIUM  3.7  3.5*  3.8   CHLORIDE  105  105  104   CO2  26  27  28   BUN  5*  4*  5*   CREATININE  0.73  0.81  0.78   CALCIUM  8.9  9.3  8.9     Recent Labs      01/06/19 0317 01/07/19   0339  01/08/19   0430   GLUCOSE  97  93  98     Recent Labs      01/06/19 0317 01/06/19   2355  01/07/19   0339  01/07/19   0730  01/07/19   1404  01/08/19   0430   RBC  3.16*   --    --   3.03*   --    --   2.92*   HEMOGLOBIN  9.4*   --    --   9.0*   --    --   8.9*   HEMATOCRIT  29.8*   --    --   28.6*   --    --   27.7*   PLATELETCT  203    --    --   191   --    --   195   APTT   --    < >  146.4*   --   87.4*  52.8*   --     < > = values in this interval not displayed.     Recent Labs      19   0317  19   0339  19   0430   WBC  7.9  6.8  6.8     Hemodynamics:  Temp (24hrs), Av °C (98.6 °F), Min:36.6 °C (97.9 °F), Max:37.5 °C (99.5 °F)  Temperature: 36.9 °C (98.4 °F)  Pulse  Av.5  Min: 52  Max: 126   Blood Pressure : 102/54     Respiratory:    Respiration: 18, Pulse Oximetry: 95 %     Work Of Breathing / Effort: Shallow  RUL Breath Sounds: Clear, RML Breath Sounds: Clear, RLL Breath Sounds: Diminished, JACQUI Breath Sounds: Clear, LLL Breath Sounds: Diminished  Fluids:    Intake/Output Summary (Last 24 hours) at 19 1000  Last data filed at 19 0900   Gross per 24 hour   Intake              920 ml   Output              700 ml   Net              220 ml        GI/Nutrition:  Orders Placed This Encounter   Procedures   • Diet Order Regular (Jaw wired. Can you please send soup in a coffee cup for patient comfort. No tomato soup, no cranberry juice, no peach marc, no wheat products, no almond milk, no coffee, prefers choboni yogurt smoothies)     Standing Status:   Standing     Number of Occurrences:   1     Order Specific Question:   Diet:     Answer:   Regular [1]     Comments:   Jaw wired. Can you please send soup in a coffee cup for patient comfort. No tomato soup, no cranberry juice, no peach marc, no wheat products, no almond milk, no coffee, prefers choboni yogurt smoothies     Order Specific Question:   Consistency/Fluid modifications:     Answer:   Nectar Thick [2]     Order Specific Question:   Consistency/Fluid modifications:     Answer:   Wired Jaw [4]     Order Specific Question:   Miscellaneous modifications:     Answer:   Gluten Free per PT [10]     Medical Decision Making, by Problem:  Active Hospital Problems    Diagnosis   • *Intractable cyclical vomiting with nausea [G43.A1]   • Ileostomy stenosis  ([facility]) [K94.13]   • Crohn's disease of small intestine with complication ([facility]) [K50.019]   • DVT (deep venous thrombosis) ([facility]) [I82.409]   • Pedal edema [R60.0]   • History of MRSA infection [Z86.14]   • History of infection with vancomycin resistant Enterococcus (VRE) [Z86.19]   • Dysphasia [R47.02]   • Hypokalemia [E87.6]   • DDD (degenerative disc disease), lumbar [M51.36]   • Paroxysmal atrial fibrillation (HCC) [I48.0]   • Chronic pain [G89.29]   • Opioid type dependence, continuous (CMS-HCC) [F11.20]     PROBLEMS:  1. Dysphagia.  Chronic.  Barium esophagram with possible spastic esophagus and delayed emptying of contrast out of esophagus.  EGD with suspected spastic esophagus and tight LES but normal esophageal mucosa.  Suspect dysmotility disorder, ?achalasia versus ineffective motility from narcotics versus spastic esophagus.  Slight improvement post-esophageal dilation  2. Crohns disease s/p colectomy with ileostomy and recent ileostomy revision  3. Chronic abdominal pain on narcotics  4. DVT on heparin drip     Plan:  1. OK for discharge from GI standpoint   2. Will arrange outpatient esophageal manometry.  Has clinic appointment scheduled with Dr. Mahan on 1/16 or 1/19  3. Would hold on Botox until manometry can be performed  4. If unable to tolerate pureed diet post-esophageal dilation, then would start nitrate such as Isordil for esophageal dysmotilit  5. Discussed need to minimize narcotics which is likely making swallowing worse    Quality-Core Measures   Reviewed items::  Labs reviewed, Medications reviewed and Radiology images reviewed

## 2019-01-08 NOTE — PROGRESS NOTES
Hospital Medicine Daily Progress Note    Date of Service  1/7/2019    Chief Complaint  Abdominal pain    Hospital Course    63 y.o. female with PMH Crohn's with previous colectomy with ileostomy admitted 12/28/2018 with generalized abdominal pain, nausea decreased output from her ileostomy.  Recently hospitalized at [facility] and evaluated by Dr. Nunez.  Patient was evaluated by surgery.  Patient underwent surgery ostomy revision and takedown of a new ostomy creation 12/29.  Patient tolerated procedure very well.  Noted to have acute LLE DVT on heparin.  Has ongoing dysphagia.  S/P EGD and dilation.      Interval Problem Update  1/5:  Continues to be very anxious, constantly requesting more IV pain medications.  Clinical status does not correlate with requests for increased pain medications.  Also complains of ongoing nausea.  VSS.  1/6:  S/P EGD with dilation.  Patient told her IV dilaudid dose would be decreased as she will start oral pain medications.  She became very aggressive threatening to request a new physician if she doesn't get the pain medications she wants.  Exhibiting extreme drug-seeking behaviors.    1/7:  Dysphagia improved.  Advance diet as tolerated.  D/C heparin and start oral Xarelto.  Patient continues to demand IV pain medications.  Refusing to try oral pain medications.        Consultants/Specialty  -General surgery  -GI    Code Status  Full    Disposition  TBD.    Review of Systems  Review of Systems   Constitutional: Negative for chills and fever.        No interval improvement   HENT: Negative for congestion and sore throat.    Eyes: Negative for blurred vision and double vision.   Respiratory: Negative for cough and shortness of breath.    Cardiovascular: Positive for leg swelling. Negative for chest pain and palpitations.   Gastrointestinal: Positive for nausea. Negative for constipation, diarrhea and vomiting.   Genitourinary: Negative for dysuria.   Musculoskeletal: Positive for  myalgias.   Skin: Negative for itching and rash.   Neurological: Positive for weakness. Negative for dizziness, tingling, tremors, sensory change, speech change, focal weakness and headaches.   Psychiatric/Behavioral: The patient is nervous/anxious.         Physical Exam  Temp:  [36.3 °C (97.4 °F)-37.2 °C (98.9 °F)] 36.9 °C (98.4 °F)  Pulse:  [77-91] 82  Resp:  [18] 18  BP: (125-149)/(70-81) 147/81    Physical Exam   Constitutional: She is oriented to person, place, and time. Vital signs are normal. She appears well-developed. She is cooperative.   HENT:   Head: Normocephalic and atraumatic.   Right Ear: Hearing normal.   Left Ear: Hearing normal.   Mouth/Throat: Mucous membranes are normal.   Eyes: Conjunctivae are normal. Right eye exhibits no discharge. Left eye exhibits no discharge.   Neck: Normal range of motion and phonation normal. Neck supple. No JVD present. No tracheal deviation present.   Cardiovascular: Regular rhythm, normal heart sounds and intact distal pulses.  Tachycardia present.    No murmur heard.  Pulmonary/Chest: Effort normal and breath sounds normal. No stridor. No respiratory distress. She has no wheezes. She has no rhonchi. She has no rales.   Abdominal: Soft. Normal appearance and bowel sounds are normal. She exhibits no distension. There is tenderness. There is no rebound.   Ileostomy   Stoma remains pink  Midline abdominal vertical incision with staples in place, appears C/D/I,     Musculoskeletal: Normal range of motion. She exhibits edema (pedal B/L, warm, ).   BLE edema.     Neurological: She is alert and oriented to person, place, and time. She has normal strength. No cranial nerve deficit.   Skin: Skin is warm and dry. She is not diaphoretic. No erythema.   Psychiatric: Her mood appears anxious. She is agitated and aggressive.   Nursing note and vitals reviewed.      Fluids    Intake/Output Summary (Last 24 hours) at 01/07/19 1610  Last data filed at 01/07/19 1300   Gross per 24  hour   Intake              940 ml   Output              450 ml   Net              490 ml       Laboratory  Recent Labs      01/05/19   0003  01/06/19   0317  01/07/19   0339   WBC  11.2*  7.9  6.8   RBC  3.60*  3.16*  3.03*   HEMOGLOBIN  10.9*  9.4*  9.0*   HEMATOCRIT  32.9*  29.8*  28.6*   MCV  91.4  94.3  94.4   MCH  30.3  29.7  29.7   MCHC  33.1*  31.5*  31.5*   RDW  44.8  48.0  48.6   PLATELETCT  238  203  191   MPV  10.2  10.3  10.4     Recent Labs      01/05/19   0003  01/06/19   0317  01/07/19   0339   SODIUM  136  139  137   POTASSIUM  3.3*  3.7  3.5*   CHLORIDE  101  105  105   CO2  25  26  27   GLUCOSE  101*  97  93   BUN  6*  5*  4*   CREATININE  0.99  0.73  0.81   CALCIUM  9.7  8.9  9.3     Recent Labs      01/06/19   2355  01/07/19   0730  01/07/19   1404   APTT  146.4*  87.4*  52.8*               Imaging  DX-UPPER GI-SERIES WITH KUB   Final Result      Marked esophageal dysmotility hypomotility. Only trace of contrast passes into the stomach.      US-EXTREMITY VENOUS LOWER BILAT   Final Result      IR-US GUIDED PIV   Final Result    Ultrasound-guided PERIPHERAL IV INSERTION performed by    qualified nursing staff as above.            IR-MIDLINE CATHETER INSERTION >5 YRS    (Results Pending)        Assessment/Plan  * Intractable cyclical vomiting with nausea- (present on admission)   Assessment & Plan    -nausea remains an ongoing issues  -No further evidence of vomiting.  -Continue anti-emetics.         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HCC)- (present on admission)   Assessment & Plan    Status post surgery 12/30, POD#7  Tolerated procedure very well.  Continue monitor by surgery.  -good output now, non bloody  -Continues to complain of severe pain with no clinical evidence to support level of pain.  Concern for drug-seeking.    -Decrease IV dilaudid.     Crohn's disease of small intestine with complication (HCC)- (present on admission)   Assessment & Plan    -Followed as outpatient by Dr. Cruz  -Not currently in  acute exacerbation       DVT (deep venous thrombosis) (HCC)   Assessment & Plan    Patient does have a history of DVT.  Ultrasound today showing newly developed a DVT, likely provoked by recent surgery  -Was on heparin gtts secondary to dysphagia  -D/C heparin and start xarelto after esophageal dilation.   -likely total time period for AC will be 3-6 months     Pedal edema   Assessment & Plan    2/2 surgery and volume resuscitation, slowly improving  -Restart lasix.     History of infection with vancomycin resistant Enterococcus (VRE)- (present on admission)   Assessment & Plan    -remote history of     History of MRSA infection- (present on admission)   Assessment & Plan    -Per chart review (+) MRSA sergio 2014     Dysphasia- (present on admission)   Assessment & Plan    -History of  -Followed by GI as outpatient  -UPPER GI with almost complete blockage of contrast material at the GE junction  -S/P EGD 1/6 with esophageal dilation and biopsy.    -Achalasia not ruled out.    -Will need oupatient manometry eval and possible LES botox depending on results.  -Dysphagia improved.  Advance diet to full liquids which is patients home diet.  -GI following.     Hypokalemia- (present on admission)   Assessment & Plan    Improved.  Monitor bmp.     DDD (degenerative disc disease), lumbar- (present on admission)   Assessment & Plan    -Chronic pain management     Paroxysmal atrial fibrillation (HCC)- (present on admission)   Assessment & Plan    -Not currently on chronic anticoagulation secondary to Crohn's  -Continue home metoprolol  -Xarelto  -no current issues       Opioid type dependence, continuous (CMS-HCC)- (present on admission)   Assessment & Plan    -Continue home oxycodone, though patient refuses oral formulation solution given prior intolerances  -have  bring in bottle unopened and have verified by pharmacy?  -concern about opioid addiction  -Patient very aggressive when told her IV narcotics would be  decreased.  -Exhibits drug-seeking behaviors.       Chronic pain- (present on admission)   Assessment & Plan    -On chronic opioid management -Dr. Telles  -Bowel protocol            VTE prophylaxis: MARCO Pena

## 2019-01-08 NOTE — PROGRESS NOTES
Pt seen and examined by Dr. Cruz this morning. He and the patient had a long discussion. Vital signs and labs reviewed.  Counseled patient on diet, activity level and progress thus far. Questions answered. Staples out today. She is okay to shower. Okay for discharge from surgical standpoint. She should follow up in our office next week.

## 2019-01-08 NOTE — PSYCHIATRY
"PSYCHIATRIC CONSULTATION:  Reason for admission:   Esophageal spasm   Reason for consult: anxiety  Requesting Physician: Tenzin Jaimes     Legal status:  Not applicable.     Chief Complaint: anxiety     HPI:   Jana Overton is a 63 y.o. year old female with a PMH of Crohns who presented to [facility] complaining of esophageal spasm, inability to swallow, vomiting. She was very defensive at first but opened during interview and participated well. hyperverbal when she is anxious. This stops when her anxiety subdues. She is very frustrated with the medical system. She has had 4 accidents here with new ostomies, and reports she hasn't had an accident for years prior to the change. She has arthritis in her hands and is unable to use it well. She demonstrates this to us, and I also have difficulty using it. She is has some clear unacknowledged anxiety, she feels very vulnerable here. Appears anxiety is much more in control when she is at home and in her own environment. She isn't particularly open to suggestions or even questions related to QOL, how she gets on, how she does things, and doesn't open up until quite a while through the interview.   She has no other signs and sx of kely, no impulsivity, no increased spending, etc. Sleep is reportedly fine at home. She denies depression but has some overt signs of it.     I personally spent over 30 minutes doing psychotherapy with this patient.  Pt participated in session focused on increasing psychological flexibility. Discussed issues related to identity and utilized self-as-context intervention to explore psychological rigidity around patient's \"story\", especially as it relates to disability. Pt has some fusion to ideas about being able-bodied vs disabled, has some preconceived notions about how people outside her immediate [place] will react to her, has some maladaptive behaviors resulting from unacknowledged anxieties.         Review of " "Systems:  Psychiatric:  Depression:      Denies depressed mood but has some significant frustration related to her health.   Sonal: pressured speech at times but this remits when she is less anxious.   Anxiety/Panic Attacks +  PTSD symptom:  Denies   Psychosis:No signs or symptoms   Other:  Neurologic:   Weakness.   Eyes:  Denies blurry vision   Skin:  No issues   Musculoskeletal:  Pain, mostly abdominal   CV:  No cp   Lungs:  No sob   GI:  abd pain. No n/v.   :  No dysuria.  Endocrine:    All other systems reviewed and are negative.        Psychiatric Examination: observed phenomenon:  Vitals: /54   Pulse 74   Temp 36.9 °C (98.4 °F) (Temporal)   Resp 18   Ht 1.829 m (6' 0.01\")   Wt 76.6 kg (168 lb 14 oz)   SpO2 95%   Breastfeeding? No   BMI 22.90 kg/m²  Body mass index is 22.9 kg/m².  Musculoskeletal: no psychomotor agitation or retardation   Appearance:Grooming wnl . Has ostomy  Thought Process: circumstantial at times.   Thought Content: No a/vh, no evidence of delusions, no ideas of reference, no internal stimulation noted   Speech:Nl tone, rate, and volume. Not pressured. Understandable.   Mood:          axious   Affect:         Full and congruent   SI/HI:   None   Attention/Alertness:   Awake, alert   Memory:    Grossly intact.   Orientation:       Grossly intact.   Cognition:  Grossly intact.   Insight/Judgement into symptoms:  Fair   Neurological Testing:          Past Psychiatric Hx:   Denies previous psych meds  No hx suicide attempts  No hx hospitalizations  Has seen therapist.     Family Psychiatric Hx:  Denies     Social Hx:  Social History     Social History   • Marital status:      Spouse name: N/A   • Number of children: N/A   • Years of education: N/A     Occupational History   • Not on file.     Social History Main Topics   • Smoking status: Never Smoker   • Smokeless tobacco: Never Used      Comment: second hand smoke   • Alcohol use Yes      Comment: rare   • Drug use: Yes "      Comment: medical marijuana   • Sexual activity: Not on file      Comment:      Other Topics Concern   • Not on file     Social History Narrative   • No narrative on file     Lives in [facility]. Has pilates provider, has good support through family and friends     Drug/Alcohol/Tobacco Hx:   Drugs:denies    Alcohol:social   Tobacco:denies     Medical Hx: labs, MARS, medications, etc were reviewed. Only those findings of potential interest to psychiatry are noted below:    Past Medical History:   Diagnosis Date   • Anesthesia     difficult Iv stick   • Anxiety    • Arthritis     all over   • ASTHMA    • Atrial fib/flut    • Backpain    • Breath shortness    • Bronchitis     5 years   • Chronic pain    • Colostomy in place ([facility]) 10/14/2010   • COPD (chronic obstructive pulmonary disease) ([facility]) 6/24/2014   • COPD (chronic obstructive pulmonary disease) ([facility]) 6/24/2014   • Crohn's disease of colon ([facility])    • Dyspnea    • History of cardiac murmur    • Hypokalemia 6/24/2014   • Indigestion    • Infectious disease     MRSA, VancoRSA   • Multiple falls 6/24/2014   • Narcotic dependence ([facility]) 9/23/2009   • Obstruction    • Other specified disorder of intestines     crohns disease   • Pain 7/11/13    all over   • Pneumonia     child and mid 30's   • Postherpetic neuralgia 6/24/2014   • Rosacea 6/24/2014   • Sleep apnea      Past Surgical History:   Procedure Laterality Date   • GASTROSCOPY  1/6/2019    Procedure: GASTROSCOPY- WITH DILATION;  Surgeon: Daniel Daniels M.D.;  Location: [facility];  Service: Gastroenterology   • ILEOSTOMY  12/29/2018    Procedure: ILEOSTOMY- REVISION;  Surgeon: Kevin Cruz M.D.;  Location: SURGERY [facility];  Service: General   • SIGMOIDOSCOPY FLEX  12/29/2018    Procedure: SIGMOIDOSCOPY FLEX;  Surgeon: Kevin Cruz M.D.;  Location: SURGERY [facility];  Service: General   • EXPLORATORY LAPAROTOMY  12/29/2018    Procedure: EXPLORATORY LAPAROTOMY, lysis of  adhesions;  Surgeon: Kevin Cruz M.D.;  Location: SURGERY [facility];  Service: General   • ILEOSTOMY  5/14/2014    Performed by Kevin Cruz M.D. at SURGERY [facility]   • MAMMOPLASTY REDUCTION  7/17/2013    Performed by Janey Burt M.D. at SURGERY [facility]   • HARDWARE REMOVAL NEURO  7/16/2012    Performed by KEELEY KIM at SURGERY [facility]   • GASTROSCOPY  10/4/2011    Performed by TYSON MARTINEZ at SURGERY SAME DAY [facility] ORS   • COLONOSCOPY  10/4/2011    Performed by TYSON MARTINEZ at SURGERY SAME DAY [facility] ORS   • DILATION AND CURETTAGE  9/24/2010    Performed by GAURAV ALY at SURGERY SAME DAY [facility] ORS   • ILEOSTOMY  11/11/2009    Performed by KEVIN CRUZ at SURGERY [facility]   • GASTROSCOPY WITH BIOPSY  11/22/08    Performed by NEGRITA HUYNH at SURGERY [facility]   • ABDOMINAL EXPLORATION     • CERVICAL DISK AND FUSION ANTERIOR     • COLON RESECTION     • FOOT SURGERY     • HAND SURGERY     • LUMBAR FUSION ANTERIOR     • LUMPECTOMY     • OTHER ABDOMINAL SURGERY      surgery for chrons disease   • PB REDUCTION OF LARGE BREAST         Allergies:   Allergies   Allergen Reactions   • Azathioprine Sodium Hives   • Carafate [Sucralfate] Rash     Generalizes/Mouth Sores   • Infliximab Hives   • Morphine Swelling   • Roxanol [Morphine Sulfate] Swelling     Throat swells   • Amitriptyline Unspecified     Nightmares     • Demerol Vomiting   • Fentanyl Unspecified     Patches caused skin breakdown, no pain relief   • Lorazepam Unspecified     States give me nightmares.    • Methadone    • Methotrexate Hives and Rash   • Pregabalin Rash     Rash     • Pseudoephedrine Vomiting   • Sulfa Drugs Hives   • Tape Rash     Skin peels off; paper tape   • Celestone [Betamethasone Sodium Phosphate] Unspecified     Pt unable to remember   • Sulfasalazine Unspecified     Pt unable to remember   • Tizanidine Hydrochloride Unspecified     Pt unable to  remember       Medications:  Current Facility-Administered Medications   Medication Dose Route Frequency Provider Last Rate Last Dose   • rivaroxaban (XARELTO) tablet 15 mg  15 mg Oral BID WITH MEALS Tenzin Jaimes A.P.R.N.   15 mg at 01/08/19 0826    Followed by   • [START ON 1/28/2019] rivaroxaban (XARELTO) tablet 20 mg  20 mg Oral PM MEAL Tenzin Jaimes A.P.R.N.       • furosemide (LASIX) tablet 20 mg  20 mg Oral Q DAY Tenzin Jaimes A.P.R.N.   20 mg at 01/08/19 0450   • potassium bicarbonate (KLYTE) effervescent tablet 25 mEq  25 mEq Oral DAILY Tenzin Jaimes A.P.R.N.   25 mEq at 01/08/19 0449   • HYDROmorphone pf (DILAUDID) injection 0.5 mg  0.5 mg Intravenous Q4HRS PRN Félix Palmer M.D.   0.5 mg at 01/08/19 1138   • ketorolac (TORADOL) injection 30 mg  30 mg Intravenous Q6HRS PRN Tenzin Jaimes A.P.R.N.   30 mg at 01/06/19 2324   • oxyCODONE 20 MG/ML concentrate 30 mg  30 mg Oral Q4HRS PRN Félix Palmer M.D.   10 mg at 01/08/19 0450   • prochlorperazine (COMPAZINE) injection 10 mg  10 mg Intravenous Q6HRS PRN Félix Palmer M.D.   10 mg at 01/08/19 0944   • metoprolol (LOPRESSOR) tablet 25 mg  25 mg Oral BID Rachel Singletary M.D.   25 mg at 01/08/19 0450   • haloperidol lactate (HALDOL) injection 2 mg  2 mg Intravenous Q4HRS PRN Norma Lizama A.P.R.N.   2 mg at 12/30/18 1210   • scopolamine (TRANSDERM-SCOP) patch 1 Patch  1 Patch Transdermal Q72HRS Martha Jean-Baptiste M.D.   Stopped at 01/07/19 0245   • estradiol (ESTRACE) CREA 0.5 g  0.5 g Vaginal Q72HRS Rod Valencia, M.D.   0.5 g at 01/07/19 2010   • polyethyl glycol-propyl glycol (SYSTANE) 0.4-0.3 % ophth drops 1-2 Drop  1-2 Drop Both Eyes TID PRN Rod Valencia M.D.   2 Drop at 01/08/19 0453   • albuterol inhaler 2 Puff  2 Puff Inhalation Q6HRS PRN Eduard Lopez M.D.       • cetirizine (ZYRTEC) tablet 10 mg  10 mg Oral QDAY PRN Eduard Lopez M.D.       • oxyCODONE immediate-release (ROXICODONE) tablet 30 mg  30 mg Oral Q4HRS PRN  Eduard Lopez M.D.       • senna-docusate (PERICOLACE or SENOKOT S) 8.6-50 MG per tablet 2 Tab  2 Tab Oral BID Eduard Lopez M.D.   Stopped at 12/29/18 0600    And   • polyethylene glycol/lytes (MIRALAX) PACKET 1 Packet  1 Packet Oral QDAY PRN Eduard Lopez M.D.        And   • magnesium hydroxide (MILK OF MAGNESIA) suspension 30 mL  30 mL Oral QDAY PRN Eduard Lopez M.D.        And   • bisacodyl (DULCOLAX) suppository 10 mg  10 mg Rectal QDAY PRN Eduard Lopez M.D.       • ondansetron (ZOFRAN) syringe/vial injection 4 mg  4 mg Intravenous Q4HRS PRN Eduard Lopez M.D.   4 mg at 01/08/19 1206   • ondansetron (ZOFRAN ODT) dispertab 4 mg  4 mg Oral Q4HRS PRN Eduard Lopez M.D.       • acetaminophen (TYLENOL) tablet 650 mg  650 mg Oral Q6HRS PRN Eduard Lopez M.D.   650 mg at 12/31/18 0109   • Pharmacy Consult Request ...Pain Management Review   Other PRN Eduard Lopez M.D.        And   • HYDROmorphone pf (DILAUDID) injection 0.25 mg  0.25 mg Intravenous Q3HRS PRN Eduard Lopez M.D.   0.25 mg at 12/29/18 1302       Labs/ Testing:  Recent Results (from the past 48 hour(s))   APTT    Collection Time: 01/06/19  4:59 PM   Result Value Ref Range    APTT 54.9 (H) 24.7 - 36.0 sec   APTT    Collection Time: 01/06/19 11:55 PM   Result Value Ref Range    APTT 146.4 (HH) 24.7 - 36.0 sec   CBC WITHOUT DIFFERENTIAL    Collection Time: 01/07/19  3:39 AM   Result Value Ref Range    WBC 6.8 4.8 - 10.8 K/uL    RBC 3.03 (L) 4.20 - 5.40 M/uL    Hemoglobin 9.0 (L) 12.0 - 16.0 g/dL    Hematocrit 28.6 (L) 37.0 - 47.0 %    MCV 94.4 81.4 - 97.8 fL    MCH 29.7 27.0 - 33.0 pg    MCHC 31.5 (L) 33.6 - 35.0 g/dL    RDW 48.6 35.9 - 50.0 fL    Platelet Count 191 164 - 446 K/uL    MPV 10.4 9.0 - 12.9 fL   BASIC METABOLIC PANEL    Collection Time: 01/07/19  3:39 AM   Result Value Ref Range    Sodium 137 135 - 145 mmol/L    Potassium 3.5 (L) 3.6 - 5.5 mmol/L    Chloride 105 96 - 112  mmol/L    Co2 27 20 - 33 mmol/L    Glucose 93 65 - 99 mg/dL    Bun 4 (L) 8 - 22 mg/dL    Creatinine 0.81 0.50 - 1.40 mg/dL    Calcium 9.3 8.5 - 10.5 mg/dL    Anion Gap 5.0 0.0 - 11.9   ESTIMATED GFR    Collection Time: 01/07/19  3:39 AM   Result Value Ref Range    GFR If African American >60 >60 mL/min/1.73 m 2    GFR If Non African American >60 >60 mL/min/1.73 m 2   APTT    Collection Time: 01/07/19  7:30 AM   Result Value Ref Range    APTT 87.4 (H) 24.7 - 36.0 sec   HISTOLOGY REQUEST    Collection Time: 01/07/19  7:42 AM   Result Value Ref Range    Pathology Request Sent to [facility]    APTT    Collection Time: 01/07/19  2:04 PM   Result Value Ref Range    APTT 52.8 (H) 24.7 - 36.0 sec   CBC WITHOUT DIFFERENTIAL    Collection Time: 01/08/19  4:30 AM   Result Value Ref Range    WBC 6.8 4.8 - 10.8 K/uL    RBC 2.92 (L) 4.20 - 5.40 M/uL    Hemoglobin 8.9 (L) 12.0 - 16.0 g/dL    Hematocrit 27.7 (L) 37.0 - 47.0 %    MCV 94.9 81.4 - 97.8 fL    MCH 30.5 27.0 - 33.0 pg    MCHC 32.1 (L) 33.6 - 35.0 g/dL    RDW 50.4 (H) 35.9 - 50.0 fL    Platelet Count 195 164 - 446 K/uL    MPV 10.5 9.0 - 12.9 fL   BASIC METABOLIC PANEL    Collection Time: 01/08/19  4:30 AM   Result Value Ref Range    Sodium 138 135 - 145 mmol/L    Potassium 3.8 3.6 - 5.5 mmol/L    Chloride 104 96 - 112 mmol/L    Co2 28 20 - 33 mmol/L    Glucose 98 65 - 99 mg/dL    Bun 5 (L) 8 - 22 mg/dL    Creatinine 0.78 0.50 - 1.40 mg/dL    Calcium 8.9 8.5 - 10.5 mg/dL    Anion Gap 6.0 0.0 - 11.9   ESTIMATED GFR    Collection Time: 01/08/19  4:30 AM   Result Value Ref Range    GFR If African American >60 >60 mL/min/1.73 m 2    GFR If Non African American >60 >60 mL/min/1.73 m 2       DX-UPPER GI-SERIES WITH KUB   Final Result      Marked esophageal dysmotility hypomotility. Only trace of contrast passes into the stomach.      US-EXTREMITY VENOUS LOWER BILAT   Final Result      IR-US GUIDED PIV   Final Result    Ultrasound-guided PERIPHERAL IV INSERTION performed by     qualified nursing staff as above.            IR-MIDLINE CATHETER INSERTION >5 YRS    (Results Pending)       ASSESSMENT:     Adjustment disorder with anxiety   Esophageal spasm     PLAN:  Legal status: n/a    Recommend imipramine; she isn't interested in having it here; discussed with her f/u with her outpatient provider. May help with sleep, anxiety, mood, and esophageal spasm.

## 2019-01-09 ENCOUNTER — TELEPHONE (OUTPATIENT)
Dept: INTERNAL MEDICINE | Facility: IMAGING CENTER | Age: 64
End: 2019-01-09

## 2019-01-09 PROCEDURE — 665999 HH PPS REVENUE DEBIT

## 2019-01-09 PROCEDURE — 665998 HH PPS REVENUE CREDIT

## 2019-01-09 NOTE — TELEPHONE ENCOUNTER
Jana would like to know how long she needs to stay on the Lasix and if and when she can go back to Spironolactone.  She states that she is still swollen but the potassium suspension still bothers her stomach.

## 2019-01-09 NOTE — WOUND TEAM
"[facility]  Inpatient Services  New Ostomy Management & Teaching     HPI:  Reviewed  PMH: Reviewed   SH: Reviewed     Subjective: \"I've had a bad night and ate the wrong food this morning.  I can't use this equipment\"     Objective:  appliance is intact; despite clamp ordered patient is unable to manage emptying pouch and is very frustrated upon my arrival                 Ostomy type:  ileostomy              Stoma location: LLQ              Stoma assessment:                          Appearance: Red, edematous                          Size:  1 1/2\"                          Protrusion: Well budded                          Output: small loose brown                          MC jxn   intact                          Peristomal skin:  intact                 Ostomy Appliance (type and size):  2 3/4\" two piece with paste ring     Interventions:  Assisted patient to bathroom, where she sat on commode. She then started to cry and ripped off her socks and underwear lamenting that no one is helping her.  When she started to remove her gown with IV I stopped her and she calmed down.  I removed old appliance and cleansed peristomal skin.  I cut the barrier and applied paste ring to barrier opening.  I applied barrier to peristomal skin after applying skin prep to peristomal skin at patient's request.  Snapped on pouch and closed end.  Assisted patient back to bed and discussed samples from [facility] which now wants as will order the non Lock and Roll pouch closing.  Discussed above behavior with staff RN.                 Pt education: Questions and concerns addressed; see above     Evaluation: patient is planning on discharge today.  She apologized at her behavior and just reports that she needs to go home.      Plan:  patient to discharge home today       Anticipated discharge needs:  Has everything for discharge   "

## 2019-01-09 NOTE — DISCHARGE SUMMARY
Discharge Summary    CHIEF COMPLAINT ON ADMISSION  Chief Complaint   Patient presents with   • N/V     history of Crohns       Reason for Admission  vomiting      Admission Date  12/28/2018    CODE STATUS  Full     HPI & HOSPITAL COURSE  This is a 63 y.o. Female with past medical history of Crohn's disease, chronic pain syndrome, A. fib, history of DVT, and anxiety comes in with abdominal pain.  General surgery consulted.  Patient underwent exploratory laparotomy with adhesiolysis and takedown of ileostomy and repair of parastomal hernia with placement of gentrix xenograft hernia matrix with recreation of new colostomy.  Patient tolerated surgery well.  Also patient was complaining of dysphagia.  GI consulted.  Patient underwent EGD with esophageal dilation and biopsy.  Biopsy results came back as benign squamous mucosa.  Patient also was found to have acute on chronic DVT.  Was started on Xarelto.  She was tolerating purée diet.  She was hemodynamically and clinically stable.  She was ambulatory in the room down the huynh.  Ileostomy with normal output.  She was cleared from medical standpoint.       Therefore, she is discharged in fair and stable condition to home with close outpatient follow-up.    The patient met 2-midnight criteria for an inpatient stay at the time of discharge.    Discharge Date  1/8/2019    FOLLOW UP ITEMS POST DISCHARGE  Follow-up with PCP in 1 week.  Follow-up with GI as per recommendations.  Follow-up with general surgery as per recommendations.    DISCHARGE DIAGNOSES  Principal Problem:    Intractable cyclical vomiting with nausea POA: Yes  Active Problems:    Crohn's disease of small intestine with complication (HCC) POA: Yes      Overview: Followed by GI Dr. Cruz      S/p ileostomy      Scope 5/2014-no strictures, fistulas, or new abscesses    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HCC) (Chronic) POA: Yes    DVT (deep venous thrombosis) (HCC) POA: No    Chronic pain POA: Yes      Overview: On multiple  narcotics including high dose oxycodone and Dilaudid      Valium also on medication list    Opioid type dependence, continuous (CMS-HCC) POA: Yes    Paroxysmal atrial fibrillation (HCC) POA: Yes    DDD (degenerative disc disease), lumbar POA: Yes    Dysphasia (Chronic) POA: Yes    History of MRSA infection POA: Yes    History of infection with vancomycin resistant Enterococcus (VRE) POA: Yes  Resolved Problems:    Hypokalemia POA: Yes    Pedal edema POA: No      FOLLOW UP  No future appointments.  Kristel Mahan M.D.  [address]  [address]  [place] [address]  [phone]    Schedule an appointment as soon as possible for a visit in 1 day      Kevin Cruz M.D.  [address] [facility] [address]  [place] [address]  [phone]    Schedule an appointment as soon as possible for a visit in 1 week      Amanda Bazzi M.D.  [address] [facility] [facility]  [address]  [place] [id number] [phone]    Schedule an appointment as soon as possible for a visit in 1 week        MEDICATIONS ON DISCHARGE     Medication List      START taking these medications      Instructions   furosemide 20 MG Tabs  Start taking on:  1/9/2019  Commonly known as:  LASIX   Take 1 Tab by mouth every day.  Dose:  20 mg     potassium bicarbonate 25 MEQ tablet  Start taking on:  1/9/2019  Commonly known as:  KLYTE   Take 1 Tab by mouth every day.  Dose:  25 mEq     prochlorperazine 10 MG Tabs  Commonly known as:  COMPAZINE   Take 1 Tab by mouth every 6 hours as needed for Nausea/Vomiting.  Dose:  10 mg     * rivaroxaban 15 MG Tabs tablet  Commonly known as:  XARELTO   Take 1 Tab by mouth 2 times a day, with meals.  Dose:  15 mg     * rivaroxaban 20 MG Tabs tablet  Start taking on:  1/28/2019  Commonly known as:  XARELTO   Take 1 Tab by mouth with dinner.  Dose:  20 mg        * This list has 2 medication(s) that are the same as other medications prescribed for you. Read the directions carefully, and ask your doctor or other care provider to review them with you.             CONTINUE taking these medications      Instructions   albuterol 108 (90 BASE) MCG/ACT Aers  Commonly known as:  VENTOLIN or PROVENTIL   Inhale 2 Puffs by mouth every 6 hours as needed.  Dose:  2 Puff     Azelastine HCl 137 MCG/SPRAY Soln   [place] 1 [place] in nose 2 times a day as needed (dry sinus).  Dose:  1 Spray     cetirizine 10 MG Tabs  Commonly known as:  ZYRTEC   Take 10 mg by mouth 1 time daily as needed for Allergies.  Dose:  10 mg     estradiol 0.1 MG/GM vaginal cream  Commonly known as:  ESTRACE   Insert 0.5 g in vagina every 72 hours.  Dose:  0.5 g     granisetron 1 MG Tabs  Commonly known as:  KYTRIL   Take 1 Tab by mouth every 12 hours as needed for Nausea/Vomiting.  Dose:  1 mg     ipratropium-albuterol 0.5-2.5 (3) MG/3ML nebulizer solution  Commonly known as:  DUONEB   3 mL by Nebulization route 4 times a day as needed for Shortness of Breath.  Dose:  3 mL     MAGNESIUM CITRATE PO   Take 1 Tab by mouth every 48 hours.  Dose:  1 Tab     metoprolol 25 MG Tabs  Commonly known as:  LOPRESSOR   Take 25 mg by mouth 2 times a day.  Dose:  25 mg     oxyCODONE 20 MG/ML Conc   Take 30 mg by mouth every four hours as needed (pain). 20mg/ml solution  Dose:  30 mg        STOP taking these medications    aspirin 81 MG Chew chewable tablet  Commonly known as:  ASA     Magnesium 100 MG Tabs     ondansetron 4 MG Tbdp  Commonly known as:  ZOFRAN ODT     spironolactone 25 MG Tabs  Commonly known as:  ALDACTONE     torsemide 10 MG tablet  Commonly known as:  DEMADEX            Allergies  Allergies   Allergen Reactions   • Azathioprine Sodium Hives   • Carafate [Sucralfate] Rash     Generalizes/Mouth Sores   • Infliximab Hives   • Morphine Swelling   • Roxanol [Morphine Sulfate] Swelling     Throat swells   • Amitriptyline Unspecified     Nightmares     • Demerol Vomiting   • Fentanyl Unspecified     Patches caused skin breakdown, no pain relief   • Lorazepam Unspecified     States give me nightmares.    • Methadone    •  Methotrexate Hives and Rash   • Pregabalin Rash     Rash     • Pseudoephedrine Vomiting   • Sulfa Drugs Hives   • Tape Rash     Skin peels off; paper tape   • Celestone [Betamethasone Sodium Phosphate] Unspecified     Pt unable to remember   • Sulfasalazine Unspecified     Pt unable to remember   • Tizanidine Hydrochloride Unspecified     Pt unable to remember       DIET  No orders of the defined types were placed in this encounter.      ACTIVITY  As tolerated.  Weight bearing as tolerated    CONSULTATIONS  GI  General surgery    PROCEDURES  As mentioned above    LABORATORY  Lab Results   Component Value Date    SODIUM 138 01/08/2019    POTASSIUM 3.8 01/08/2019    CHLORIDE 104 01/08/2019    CO2 28 01/08/2019    GLUCOSE 98 01/08/2019    BUN 5 (L) 01/08/2019    CREATININE 0.78 01/08/2019    CREATININE 0.89 02/10/2010    GLOMRATE >59 02/10/2010        Lab Results   Component Value Date    WBC 6.8 01/08/2019    WBC 7.9 02/10/2010    HEMOGLOBIN 8.9 (L) 01/08/2019    HEMATOCRIT 27.7 (L) 01/08/2019    PLATELETCT 195 01/08/2019        Total time of the discharge process exceeds 40 minutes.

## 2019-01-10 PROCEDURE — 665998 HH PPS REVENUE CREDIT

## 2019-01-10 PROCEDURE — 665999 HH PPS REVENUE DEBIT

## 2019-01-11 PROCEDURE — 665998 HH PPS REVENUE CREDIT

## 2019-01-11 PROCEDURE — 665999 HH PPS REVENUE DEBIT

## 2019-01-12 PROCEDURE — 665998 HH PPS REVENUE CREDIT

## 2019-01-12 PROCEDURE — 665999 HH PPS REVENUE DEBIT

## 2019-01-13 PROCEDURE — 665998 HH PPS REVENUE CREDIT

## 2019-01-13 PROCEDURE — 665999 HH PPS REVENUE DEBIT

## 2019-01-14 PROCEDURE — 665998 HH PPS REVENUE CREDIT

## 2019-01-14 PROCEDURE — 665999 HH PPS REVENUE DEBIT

## 2019-01-15 PROCEDURE — 665998 HH PPS REVENUE CREDIT

## 2019-01-15 PROCEDURE — 665999 HH PPS REVENUE DEBIT

## 2019-01-16 ENCOUNTER — TELEPHONE (OUTPATIENT)
Dept: INTERNAL MEDICINE | Facility: IMAGING CENTER | Age: 64
End: 2019-01-16

## 2019-01-16 ENCOUNTER — PATIENT OUTREACH (OUTPATIENT)
Dept: HEALTH INFORMATION MANAGEMENT | Facility: OTHER | Age: 64
End: 2019-01-16

## 2019-01-16 PROCEDURE — 665998 HH PPS REVENUE CREDIT

## 2019-01-16 PROCEDURE — 665999 HH PPS REVENUE DEBIT

## 2019-01-16 NOTE — TELEPHONE ENCOUNTER
Goyo, Jana's  called and asked if they could see Dr. Bazzi one last time.  I asked him if there was some sort of a problem going on and he said no, just a follow up.  I said that we had agreed to provide Jana with emergency type care until the 27th of January, but I did not feel like a follow up is an urgent matter. I said that this decision was per Jana's request that she did not want to see Dr. Bazzi or [facility] again.   I asked him if he had called [place] and he said yes they had her an appointment in early March.  I then told Goyo that if they really felt she needed to be seen right away, we would be happy to see her to discuss a current problem, but that would be it.  So it would be a short visit and Goyo said that didn't seem like it would be worth it then.    Jana then took the phone from Goyo and said she would not be called a liar and I told her I never even uttered that word.  I reminded her that she said she didn't want to see Dr. Bazzi again and she said this was all because of some higher up in [facility] that got his feathers in a tangle.  I again reminded Jana that she asked to leave [facility] & Dr. Bazzi.  We are just doing what she requested.    Jana then started ranting about if that is the way we want to leave this then that is fine with her.  She won't come here unless she is bleeding everywhere.  She then hung up on me.      About 5 minutes later we received a call from the call center that was Jana saying she was having a problem with her  services.  When Kennedy took the call, Jana said she was trying to reach another doctors office and hung up.

## 2019-01-16 NOTE — TELEPHONE ENCOUNTER
I called Jana today to discuss with her, her visit scheduled for this afternoon since she had fired Dr. Bazzi as her PCP at her last visit.  I have in my possession a signed return receipt showing that the letter [facility] sent out on December 27th which confirmed Jana's request to terminate her agreement with [facility] and Dr. Bazzi has been received by the patient.      As soon as Jana answered and I identified myself, Jana stated she was trying to call us to cancel her appointment today.  I told her I cancelled the appointment per her request.  I then told Jana that since she had already told us that she wanted to establish at a [facility] facility, that the closest one to her location was the [facility] on [facility].  Jana said thank you and asked if I could give that information to her  Emerita      Goyo got on the phone and I told him that since Jana had fired Dr. Bazzi at her last visit, and since she had made it perfectly clear to many different people and sites within [facility] that she did not want to have anything to do with [facility] any longer, that they should establish with the [facility] office at [address] [place] [address] as soon as possible.  I suggested they call them today since I do not know how far out their scheduling is and gave him the number of [phone].    I also told Goyo that since Jana did fire Dr. Bazzi at her last visit, Dr. Bazzi is only able to see Jana in an emergency type situation and only until the 27th of January as stated in the letter they received from [facility].  After that time, Dr. aBzzi and [facility] will no longer be able to assist Jana at all.    I did tell Goyo that the refund of Jana's unused membership fee is already in process and should be to them in the next few weeks.

## 2019-01-17 PROCEDURE — 665998 HH PPS REVENUE CREDIT

## 2019-01-17 PROCEDURE — 665999 HH PPS REVENUE DEBIT

## 2019-01-18 PROCEDURE — 665998 HH PPS REVENUE CREDIT

## 2019-01-18 PROCEDURE — 665999 HH PPS REVENUE DEBIT

## 2019-01-19 PROCEDURE — 665999 HH PPS REVENUE DEBIT

## 2019-01-19 PROCEDURE — 665998 HH PPS REVENUE CREDIT

## 2019-01-20 PROCEDURE — 665998 HH PPS REVENUE CREDIT

## 2019-01-20 PROCEDURE — 665999 HH PPS REVENUE DEBIT

## 2019-01-21 ENCOUNTER — APPOINTMENT (RX ONLY)
Dept: URBAN - METROPOLITAN AREA CLINIC 36 | Facility: CLINIC | Age: 64
Setting detail: DERMATOLOGY
End: 2019-01-21

## 2019-01-21 DIAGNOSIS — L90.5 SCAR CONDITIONS AND FIBROSIS OF SKIN: ICD-10-CM

## 2019-01-21 PROCEDURE — 99024 POSTOP FOLLOW-UP VISIT: CPT

## 2019-01-21 PROCEDURE — 665999 HH PPS REVENUE DEBIT

## 2019-01-21 PROCEDURE — 665998 HH PPS REVENUE CREDIT

## 2019-01-22 PROCEDURE — 665998 HH PPS REVENUE CREDIT

## 2019-01-22 PROCEDURE — 665999 HH PPS REVENUE DEBIT

## 2019-01-23 PROCEDURE — 665998 HH PPS REVENUE CREDIT

## 2019-01-23 PROCEDURE — 665999 HH PPS REVENUE DEBIT

## 2019-01-24 PROCEDURE — 665998 HH PPS REVENUE CREDIT

## 2019-01-24 PROCEDURE — 665999 HH PPS REVENUE DEBIT

## 2019-01-25 PROCEDURE — 665999 HH PPS REVENUE DEBIT

## 2019-01-25 PROCEDURE — 665998 HH PPS REVENUE CREDIT

## 2019-01-26 PROCEDURE — 665998 HH PPS REVENUE CREDIT

## 2019-01-26 PROCEDURE — 665999 HH PPS REVENUE DEBIT

## 2019-01-26 PROCEDURE — 665997 HH PPS REVENUE ADJ

## 2019-01-30 NOTE — ADDENDUM NOTE
Encounter addended by: Hamida Cristobal M.D. on: 1/30/2019 10:33 AM<BR>    Actions taken: Charge Capture section accepted, Sign clinical note

## 2019-02-26 ENCOUNTER — APPOINTMENT (RX ONLY)
Dept: URBAN - METROPOLITAN AREA CLINIC 20 | Facility: CLINIC | Age: 64
Setting detail: DERMATOLOGY
End: 2019-02-26

## 2019-02-26 DIAGNOSIS — L57.0 ACTINIC KERATOSIS: ICD-10-CM

## 2019-02-26 DIAGNOSIS — L81.4 OTHER MELANIN HYPERPIGMENTATION: ICD-10-CM

## 2019-02-26 DIAGNOSIS — Z85.828 PERSONAL HISTORY OF OTHER MALIGNANT NEOPLASM OF SKIN: ICD-10-CM

## 2019-02-26 DIAGNOSIS — L57.8 OTHER SKIN CHANGES DUE TO CHRONIC EXPOSURE TO NONIONIZING RADIATION: ICD-10-CM

## 2019-02-26 DIAGNOSIS — D18.0 HEMANGIOMA: ICD-10-CM

## 2019-02-26 DIAGNOSIS — L85.3 XEROSIS CUTIS: ICD-10-CM

## 2019-02-26 DIAGNOSIS — L82.1 OTHER SEBORRHEIC KERATOSIS: ICD-10-CM

## 2019-02-26 DIAGNOSIS — L71.8 OTHER ROSACEA: ICD-10-CM

## 2019-02-26 DIAGNOSIS — D22 MELANOCYTIC NEVI: ICD-10-CM

## 2019-02-26 PROBLEM — D22.5 MELANOCYTIC NEVI OF TRUNK: Status: ACTIVE | Noted: 2019-02-26

## 2019-02-26 PROBLEM — D48.5 NEOPLASM OF UNCERTAIN BEHAVIOR OF SKIN: Status: ACTIVE | Noted: 2019-02-26

## 2019-02-26 PROBLEM — D18.01 HEMANGIOMA OF SKIN AND SUBCUTANEOUS TISSUE: Status: ACTIVE | Noted: 2019-02-26

## 2019-02-26 PROCEDURE — ? PRESCRIPTION SAMPLES PROVIDED

## 2019-02-26 PROCEDURE — ? COUNSELING

## 2019-02-26 PROCEDURE — 11102 TANGNTL BX SKIN SINGLE LES: CPT

## 2019-02-26 PROCEDURE — 99214 OFFICE O/P EST MOD 30 MIN: CPT | Mod: 25

## 2019-02-26 PROCEDURE — 17000 DESTRUCT PREMALG LESION: CPT | Mod: 59

## 2019-02-26 PROCEDURE — 17003 DESTRUCT PREMALG LES 2-14: CPT

## 2019-02-26 PROCEDURE — ? ADDITIONAL NOTES

## 2019-02-26 PROCEDURE — ? LIQUID NITROGEN

## 2019-02-26 PROCEDURE — ? BIOPSY BY SHAVE METHOD AND DESTRUCTION

## 2019-02-26 PROCEDURE — ? OBSERVATION AND MEASURE

## 2019-02-26 ASSESSMENT — LOCATION DETAILED DESCRIPTION DERM
LOCATION DETAILED: RIGHT INFERIOR CENTRAL MALAR CHEEK
LOCATION DETAILED: LEFT INFERIOR VERMILION LIP
LOCATION DETAILED: RIGHT VENTRAL DISTAL FOREARM
LOCATION DETAILED: RIGHT RADIAL DORSAL HAND
LOCATION DETAILED: RIGHT ULNAR DORSAL HAND
LOCATION DETAILED: RIGHT POPLITEAL SKIN
LOCATION DETAILED: RIGHT INFERIOR ANTERIOR NECK
LOCATION DETAILED: LEFT PROXIMAL POSTERIOR UPPER ARM
LOCATION DETAILED: LEFT DISTAL DORSAL FOREARM
LOCATION DETAILED: LEFT DISTAL PRETIBIAL REGION
LOCATION DETAILED: RIGHT INFERIOR MEDIAL UPPER BACK
LOCATION DETAILED: LEFT SUPERIOR VERMILION LIP
LOCATION DETAILED: RIGHT VENTRAL PROXIMAL FOREARM
LOCATION DETAILED: LEFT INFERIOR CENTRAL MALAR CHEEK
LOCATION DETAILED: LEFT SUPERIOR MEDIAL MALAR CHEEK
LOCATION DETAILED: RIGHT SUPERIOR UPPER BACK
LOCATION DETAILED: LEFT SUPERIOR UPPER BACK
LOCATION DETAILED: LEFT RADIAL DORSAL HAND

## 2019-02-26 ASSESSMENT — LOCATION ZONE DERM
LOCATION ZONE: TRUNK
LOCATION ZONE: FACE
LOCATION ZONE: LIP
LOCATION ZONE: NECK
LOCATION ZONE: LEG
LOCATION ZONE: ARM
LOCATION ZONE: HAND

## 2019-02-26 ASSESSMENT — LOCATION SIMPLE DESCRIPTION DERM
LOCATION SIMPLE: LEFT LIP
LOCATION SIMPLE: LEFT UPPER BACK
LOCATION SIMPLE: LEFT POSTERIOR UPPER ARM
LOCATION SIMPLE: RIGHT ANTERIOR NECK
LOCATION SIMPLE: LEFT FOREARM
LOCATION SIMPLE: LEFT HAND
LOCATION SIMPLE: RIGHT HAND
LOCATION SIMPLE: RIGHT UPPER BACK
LOCATION SIMPLE: LEFT PRETIBIAL REGION
LOCATION SIMPLE: LEFT CHEEK
LOCATION SIMPLE: RIGHT CHEEK
LOCATION SIMPLE: RIGHT FOREARM
LOCATION SIMPLE: RIGHT POPLITEAL SKIN

## 2019-02-26 NOTE — PROCEDURE: LIQUID NITROGEN
Detail Level: Detailed
Duration Of Freeze Thaw-Cycle (Seconds): 4
Post-Care Instructions: I reviewed with the patient in detail post-care instructions. Patient is to wear sunprotection, and avoid picking at any of the treated lesions. Pt may apply Vaseline to crusted or scabbing areas.
Render Post-Care Instructions In Note?: no
Consent: The patient's consent was obtained including but not limited to risks of crusting, scabbing, blistering, scarring, darker or lighter pigmentary change, recurrence, incomplete removal and infection.

## 2019-02-26 NOTE — PROCEDURE: BIOPSY BY SHAVE METHOD AND DESTRUCTION
Wound Care: Petrolatum
Post-Care Instructions: I reviewed with the patient in detail post-care instructions. Patient is to keep the biopsy site dry overnight, and then apply bacitracin twice daily until healed. Patient may apply hydrogen peroxide soaks to remove any crusting.
Size Of Lesion In Cm (Optional): 0.5
Destruction Type: electrodesiccation
Hemostasis: Drysol
Biopsy Type: H and E
Consent: Written consent was obtained and risks were reviewed including but not limited to scarring, infection, bleeding, scabbing, incomplete removal, nerve damage and allergy to anesthesia.
Detail Level: Detailed
Lab: [id number]
Billing Type: Third-Party Bill
Notification Instructions: Patient will be notified of biopsy results. However, patient instructed to call the office if not contacted within 2 weeks.
Bill As?: Biopsy by Shave Method
Anesthesia Type: 1% lidocaine with epinephrine
Bill For Surgical Tray: no
Number Of Curettages: 3
Lab Facility: 
Size Of Lesion After Curettage: 0.8

## 2019-05-02 ENCOUNTER — APPOINTMENT (RX ONLY)
Dept: URBAN - METROPOLITAN AREA CLINIC 4 | Facility: CLINIC | Age: 64
Setting detail: DERMATOLOGY
End: 2019-05-02

## 2019-05-02 DIAGNOSIS — L57.0 ACTINIC KERATOSIS: ICD-10-CM

## 2019-05-02 DIAGNOSIS — R21 RASH AND OTHER NONSPECIFIC SKIN ERUPTION: ICD-10-CM

## 2019-05-02 PROCEDURE — ? PRESCRIPTION

## 2019-05-02 PROCEDURE — ? DEFER

## 2019-05-02 PROCEDURE — 99213 OFFICE O/P EST LOW 20 MIN: CPT

## 2019-05-02 PROCEDURE — ? ORDER TESTS

## 2019-05-02 PROCEDURE — ? COUNSELING

## 2019-05-02 RX ORDER — CLINDAMYCIN PHOSPHATE AND BENZOYL PEROXIDE 10; 50 MG/G; MG/G
GEL TOPICAL
Qty: 1 | Refills: 6 | Status: ERX

## 2019-05-02 RX ORDER — MUPIROCIN 20 MG/G
OINTMENT TOPICAL
Qty: 1 | Refills: 1 | Status: ERX

## 2019-05-02 ASSESSMENT — LOCATION DETAILED DESCRIPTION DERM
LOCATION DETAILED: LEFT ANTERIOR ATTACHMENT OF MIDDLE TURBINATE
LOCATION DETAILED: LEFT CENTRAL MALAR CHEEK
LOCATION DETAILED: RIGHT MEDIAL MALAR CHEEK
LOCATION DETAILED: RIGHT CENTRAL MALAR CHEEK

## 2019-05-02 ASSESSMENT — LOCATION ZONE DERM
LOCATION ZONE: FACE
LOCATION ZONE: NASAL_CAVITY

## 2019-05-02 ASSESSMENT — LOCATION SIMPLE DESCRIPTION DERM
LOCATION SIMPLE: LEFT CHEEK
LOCATION SIMPLE: RIGHT CHEEK
LOCATION SIMPLE: LEFT MIDDLE TURBINATE

## 2019-05-02 NOTE — PROCEDURE: DEFER
Detail Level: Zone
Introduction Text (Please End With A Colon): Need to get culture back before working on another lesion

## 2019-05-02 NOTE — PROCEDURE: ORDER TESTS
Bill For Surgical Tray: no
Expected Date Of Service: 05/02/2019
Performing Laboratory: [id number]
Billing Type: Third-Party Bill

## 2019-05-19 ENCOUNTER — HOSPITAL ENCOUNTER (INPATIENT)
Facility: MEDICAL CENTER | Age: 64
LOS: 3 days | DRG: 641 | End: 2019-05-24
Attending: EMERGENCY MEDICINE | Admitting: HOSPITALIST
Payer: MEDICARE

## 2019-05-19 ENCOUNTER — APPOINTMENT (OUTPATIENT)
Dept: RADIOLOGY | Facility: MEDICAL CENTER | Age: 64
DRG: 641 | End: 2019-05-19
Attending: EMERGENCY MEDICINE
Payer: MEDICARE

## 2019-05-19 ENCOUNTER — HOSPITAL ENCOUNTER (EMERGENCY)
Facility: MEDICAL CENTER | Age: 64
End: 2019-05-19
Attending: EMERGENCY MEDICINE
Payer: MEDICARE

## 2019-05-19 VITALS
HEIGHT: 72 IN | RESPIRATION RATE: 16 BRPM | HEART RATE: 138 BPM | OXYGEN SATURATION: 95 % | BODY MASS INDEX: 20.6 KG/M2 | SYSTOLIC BLOOD PRESSURE: 162 MMHG | WEIGHT: 152.12 LBS | DIASTOLIC BLOOD PRESSURE: 78 MMHG | TEMPERATURE: 99.4 F

## 2019-05-19 DIAGNOSIS — F41.9 ANXIETY: ICD-10-CM

## 2019-05-19 DIAGNOSIS — R13.19 ESOPHAGEAL DYSPHAGIA: ICD-10-CM

## 2019-05-19 DIAGNOSIS — K50.819 CROHN'S DISEASE OF SMALL AND LARGE INTESTINES WITH COMPLICATION (HCC): ICD-10-CM

## 2019-05-19 DIAGNOSIS — R11.2 INTRACTABLE VOMITING WITH NAUSEA, UNSPECIFIED VOMITING TYPE: ICD-10-CM

## 2019-05-19 DIAGNOSIS — E86.0 DEHYDRATION: ICD-10-CM

## 2019-05-19 DIAGNOSIS — R11.2 NAUSEA AND VOMITING, INTRACTABILITY OF VOMITING NOT SPECIFIED, UNSPECIFIED VOMITING TYPE: ICD-10-CM

## 2019-05-19 DIAGNOSIS — E87.20 LACTIC ACIDOSIS: ICD-10-CM

## 2019-05-19 PROBLEM — R65.10 SIRS (SYSTEMIC INFLAMMATORY RESPONSE SYNDROME) (HCC): Status: ACTIVE | Noted: 2019-05-19

## 2019-05-19 PROBLEM — R00.0 TACHYCARDIA: Status: ACTIVE | Noted: 2019-05-19

## 2019-05-19 PROCEDURE — G0378 HOSPITAL OBSERVATION PER HR: HCPCS

## 2019-05-19 PROCEDURE — 80053 COMPREHEN METABOLIC PANEL: CPT

## 2019-05-19 PROCEDURE — 700105 HCHG RX REV CODE 258: Performed by: HOSPITALIST

## 2019-05-19 PROCEDURE — 96375 TX/PRO/DX INJ NEW DRUG ADDON: CPT

## 2019-05-19 PROCEDURE — A9270 NON-COVERED ITEM OR SERVICE: HCPCS | Performed by: EMERGENCY MEDICINE

## 2019-05-19 PROCEDURE — 99220 PR INITIAL OBSERVATION CARE,LEVL III: CPT | Performed by: HOSPITALIST

## 2019-05-19 PROCEDURE — 700111 HCHG RX REV CODE 636 W/ 250 OVERRIDE (IP): Performed by: EMERGENCY MEDICINE

## 2019-05-19 PROCEDURE — 700105 HCHG RX REV CODE 258: Performed by: EMERGENCY MEDICINE

## 2019-05-19 PROCEDURE — 83605 ASSAY OF LACTIC ACID: CPT

## 2019-05-19 PROCEDURE — 85025 COMPLETE CBC W/AUTO DIFF WBC: CPT

## 2019-05-19 PROCEDURE — 99285 EMERGENCY DEPT VISIT HI MDM: CPT

## 2019-05-19 PROCEDURE — 700102 HCHG RX REV CODE 250 W/ 637 OVERRIDE(OP): Performed by: EMERGENCY MEDICINE

## 2019-05-19 PROCEDURE — 96372 THER/PROPH/DIAG INJ SC/IM: CPT

## 2019-05-19 PROCEDURE — 700111 HCHG RX REV CODE 636 W/ 250 OVERRIDE (IP): Performed by: HOSPITALIST

## 2019-05-19 PROCEDURE — 36415 COLL VENOUS BLD VENIPUNCTURE: CPT

## 2019-05-19 PROCEDURE — 96374 THER/PROPH/DIAG INJ IV PUSH: CPT

## 2019-05-19 PROCEDURE — 74022 RADEX COMPL AQT ABD SERIES: CPT

## 2019-05-19 PROCEDURE — 83690 ASSAY OF LIPASE: CPT

## 2019-05-19 PROCEDURE — 99283 EMERGENCY DEPT VISIT LOW MDM: CPT

## 2019-05-19 RX ORDER — AMOXICILLIN 250 MG
2 CAPSULE ORAL 2 TIMES DAILY
Status: DISCONTINUED | OUTPATIENT
Start: 2019-05-19 | End: 2019-05-24 | Stop reason: HOSPADM

## 2019-05-19 RX ORDER — HYDRALAZINE HYDROCHLORIDE 10 MG/1
5 TABLET, FILM COATED ORAL EVERY 6 HOURS PRN
Status: DISCONTINUED | OUTPATIENT
Start: 2019-05-19 | End: 2019-05-24 | Stop reason: HOSPADM

## 2019-05-19 RX ORDER — SODIUM CHLORIDE 9 MG/ML
INJECTION, SOLUTION INTRAVENOUS CONTINUOUS
Status: DISCONTINUED | OUTPATIENT
Start: 2019-05-19 | End: 2019-05-22

## 2019-05-19 RX ORDER — POTASSIUM CHLORIDE 20 MEQ/1
20 TABLET, EXTENDED RELEASE ORAL DAILY
Status: DISCONTINUED | OUTPATIENT
Start: 2019-05-20 | End: 2019-05-21

## 2019-05-19 RX ORDER — DIAZEPAM 2 MG/1
2 TABLET ORAL ONCE
Status: ACTIVE | OUTPATIENT
Start: 2019-05-19 | End: 2019-05-20

## 2019-05-19 RX ORDER — HYDROMORPHONE HYDROCHLORIDE 2 MG/1
2 TABLET ORAL
Status: DISPENSED | OUTPATIENT
Start: 2019-05-19 | End: 2019-05-21

## 2019-05-19 RX ORDER — LORAZEPAM 2 MG/ML
1 INJECTION INTRAMUSCULAR
Status: DISPENSED | OUTPATIENT
Start: 2019-05-19 | End: 2019-05-20

## 2019-05-19 RX ORDER — POLYETHYLENE GLYCOL 3350 17 G/17G
1 POWDER, FOR SOLUTION ORAL
Status: DISCONTINUED | OUTPATIENT
Start: 2019-05-19 | End: 2019-05-24 | Stop reason: HOSPADM

## 2019-05-19 RX ORDER — SODIUM CHLORIDE 9 MG/ML
1000 INJECTION, SOLUTION INTRAVENOUS ONCE
Status: COMPLETED | OUTPATIENT
Start: 2019-05-19 | End: 2019-05-19

## 2019-05-19 RX ORDER — ONDANSETRON 2 MG/ML
4 INJECTION INTRAMUSCULAR; INTRAVENOUS ONCE
Status: DISCONTINUED | OUTPATIENT
Start: 2019-05-19 | End: 2019-05-19 | Stop reason: HOSPADM

## 2019-05-19 RX ORDER — SODIUM CHLORIDE 9 MG/ML
1000 INJECTION, SOLUTION INTRAVENOUS ONCE
Status: DISCONTINUED | OUTPATIENT
Start: 2019-05-19 | End: 2019-05-19 | Stop reason: HOSPADM

## 2019-05-19 RX ORDER — LABETALOL 100 MG/1
100 TABLET, FILM COATED ORAL EVERY 6 HOURS PRN
Status: DISCONTINUED | OUTPATIENT
Start: 2019-05-19 | End: 2019-05-24 | Stop reason: HOSPADM

## 2019-05-19 RX ORDER — HYDROMORPHONE HYDROCHLORIDE 1 MG/ML
1 INJECTION, SOLUTION INTRAMUSCULAR; INTRAVENOUS; SUBCUTANEOUS ONCE
Status: COMPLETED | OUTPATIENT
Start: 2019-05-19 | End: 2019-05-19

## 2019-05-19 RX ORDER — HEPARIN SODIUM 5000 [USP'U]/ML
5000 INJECTION, SOLUTION INTRAVENOUS; SUBCUTANEOUS EVERY 8 HOURS
Status: DISPENSED | OUTPATIENT
Start: 2019-05-19 | End: 2019-05-21

## 2019-05-19 RX ORDER — BISACODYL 10 MG
10 SUPPOSITORY, RECTAL RECTAL
Status: DISCONTINUED | OUTPATIENT
Start: 2019-05-19 | End: 2019-05-24 | Stop reason: HOSPADM

## 2019-05-19 RX ORDER — ONDANSETRON 2 MG/ML
4 INJECTION INTRAMUSCULAR; INTRAVENOUS ONCE
Status: COMPLETED | OUTPATIENT
Start: 2019-05-19 | End: 2019-05-19

## 2019-05-19 RX ORDER — HYDRALAZINE HYDROCHLORIDE 20 MG/ML
20 INJECTION INTRAMUSCULAR; INTRAVENOUS EVERY 6 HOURS PRN
Status: DISCONTINUED | OUTPATIENT
Start: 2019-05-19 | End: 2019-05-24 | Stop reason: HOSPADM

## 2019-05-19 RX ADMIN — HEPARIN SODIUM 5000 UNITS: 5000 INJECTION, SOLUTION INTRAVENOUS; SUBCUTANEOUS at 22:06

## 2019-05-19 RX ADMIN — HYDROMORPHONE HYDROCHLORIDE 1 MG: 1 INJECTION, SOLUTION INTRAMUSCULAR; INTRAVENOUS; SUBCUTANEOUS at 16:33

## 2019-05-19 RX ADMIN — LORAZEPAM 1 MG: 2 INJECTION INTRAMUSCULAR; INTRAVENOUS at 23:06

## 2019-05-19 RX ADMIN — HYDRALAZINE HYDROCHLORIDE 20 MG: 20 INJECTION INTRAMUSCULAR; INTRAVENOUS at 23:06

## 2019-05-19 RX ADMIN — ONDANSETRON 4 MG: 2 INJECTION INTRAMUSCULAR; INTRAVENOUS at 16:34

## 2019-05-19 RX ADMIN — SODIUM CHLORIDE: 9 INJECTION, SOLUTION INTRAVENOUS at 22:07

## 2019-05-19 RX ADMIN — SODIUM CHLORIDE 1000 ML: 9 INJECTION, SOLUTION INTRAVENOUS at 17:23

## 2019-05-19 ASSESSMENT — ENCOUNTER SYMPTOMS
NERVOUS/ANXIOUS: 0
BRUISES/BLEEDS EASILY: 0
ABDOMINAL PAIN: 1
SPUTUM PRODUCTION: 0
LOSS OF CONSCIOUSNESS: 0
VOMITING: 1
SORE THROAT: 0
PALPITATIONS: 1
FEVER: 0
FOCAL WEAKNESS: 0
FLANK PAIN: 0
EYE REDNESS: 0
HALLUCINATIONS: 0
SPEECH CHANGE: 0
SEIZURES: 0
EYE PAIN: 0
STRIDOR: 0
EYE DISCHARGE: 0
COUGH: 0
DIARRHEA: 0
MYALGIAS: 0
HEMOPTYSIS: 0
NAUSEA: 1
SHORTNESS OF BREATH: 0
CHILLS: 1
BLOOD IN STOOL: 0

## 2019-05-19 NOTE — ED NOTES
"Pt refused an IV, she rambles that,  \"only one person that works as a venous person can start this IV and that I need to call them.\" I asked her to let me look but she said \"no that everyone looks and then they try and can't start the IV and then they have to say they are sorry.\" She continues that she \"normally gets a shot and goes home.\" ERP made aware.  "

## 2019-05-19 NOTE — ED NOTES
Attempting to complete triage, pt has flight of ideas and tangential.  Difficult to get straight description of problems

## 2019-05-19 NOTE — DISCHARGE INSTRUCTIONS
At this time you are refusing to allow the nursing staff in the emergency department to attempt IV access port to draw labs.  For this reason you are refusing all further care and intervention at this time.  You will be leaving the ER today AGAINST MEDICAL ADVICE as the physician has offered on multiple occasions to help you but we are unable to do so without being able to draw blood or gain IV access.  You are welcome to return to the ER at any time if you change your mind about complying with the procedures that need to be done in order to address your complaints/concerns.  It is advisable to return to the ER immediately for any worsening symptoms or concerns.

## 2019-05-19 NOTE — ED NOTES
Pt assisted very carefully to a wheelchair with her legs extended onto a chair as she stated they do not bend. Warm blankets wrapped around her. She was slowly taken to the lobby where she is discharged. She thanked us for taking care of her and thanked security for their help. Her cell phone was put in her lap for her use.

## 2019-05-19 NOTE — ED TRIAGE NOTES
Chief Complaint   Patient presents with   • Anxiety     pt speaking rapidly, difficult to refocus   • N/V     x 3 days     BP (!) 162/78   Pulse (!) 138   Temp 37.4 °C (99.4 °F) (Temporal)   Resp 16   SpO2 95%     Pt BIB [facility] from home for c/o N/V x 3 days.  Pt appears anxious, speaking very rapidly and talking about multiple subjects.  Pt reports Hx of Crohn's.

## 2019-05-19 NOTE — ED NOTES
Attempted to explain again to pt and family member pt has been discharged.  Pt refusing to get out of bed, get into a WC or get dressed.  Security at bedside, pt yelling and screaming, will not cooperate w/ RN.  Charge RN at bedside, ERP at bedside.

## 2019-05-19 NOTE — ED NOTES
"Asked patient if I can assist her to get dressed. Patient said\" get out of my room I am not getting dressed\". Primary RN aware  "

## 2019-05-19 NOTE — ED TRIAGE NOTES
BIB EMS for N/V and abdominal pain.  Pt has a hx of Crohns disease, she was seen at [facility] today, refused treatment and left AMA. She then called another ambulance from [facility] to come here.
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
## 2019-05-19 NOTE — ED PROVIDER NOTES
ED Provider Note    Scribed for Madyson Butler M.D. by Caleb Vigil. 5/19/2019, 4:04 PM.    Primary care provider: No primary care provider noted.  Means of arrival: Ambulance  History obtained from: Patient  History limited by: Patient is tangential and is focused on talking about  and suing [facility].    CHIEF COMPLAINT  Chief Complaint   Patient presents with   • Abdominal Pain       HPI  Jana Overton is a 63 y.o. female who has a medical history of Crohns Disease presents to the Emergency Department via ambulance for evaluation of chronic abdominal pain with associated symptoms of nausea, vomiting, less diarrhea than normal, however is passing gas, and decreased PO intake onset 3 days ago. She does not believe that she has an obstruction. She has been taking pain medications for the past 21 years and states she has not taken any today. Per nurse's note, the patient was seen today at [facility], however refused treatment and left AMA. She then called another ambulance to come to [facility]. The patient is requesting to be hydrated via venous access. Dr. Cruz is her surgeon. She reports past use of marijuana. No complaints of dysuria, however patient states she does not urinate.  Patient is on Xarelto.    HPI is limited secondary to patient is tangential and is focused on talking about  and suing [facility].    REVIEW OF SYSTEMS  See [facility] for further details.     ROS is limited secondary to patient is tangential and is focused on talking about  and suing [facility].    PAST MEDICAL HISTORY   has a past medical history of Anesthesia; Anxiety; Arthritis; ASTHMA; Atrial fib/flut; Backpain; Breath shortness; Bronchitis; Chronic pain; Colostomy in place ([facility]) (10/14/2010); COPD (chronic obstructive pulmonary disease) ([facility]) (6/24/2014); COPD (chronic obstructive pulmonary disease) ([facility]) (6/24/2014); Crohn's disease of colon ([facility]); Dyspnea; History of cardiac murmur; Hypokalemia (6/24/2014);  Indigestion; Infectious disease; Multiple falls (6/24/2014); Narcotic dependence (HCC) (9/23/2009); Obstruction; Other specified disorder of intestines; Pain (7/11/13); Pneumonia; Postherpetic neuralgia (6/24/2014); Rosacea (6/24/2014); and Sleep apnea.    SURGICAL HISTORY   has a past surgical history that includes lumbar fusion anterior; cervical disk and fusion anterior; foot surgery; hand surgery; other abdominal surgery; gastroscopy with biopsy (11/22/08); ileostomy (11/11/2009); dilation and curettage (9/24/2010); gastroscopy (10/4/2011); colonoscopy (10/4/2011); hardware removal neuro (7/16/2012); mammoplasty reduction (7/17/2013); ileostomy (5/14/2014); reduction of large breast; abdominal exploration; lumpectomy; colon resection; ileostomy (12/29/2018); sigmoidoscopy flex (12/29/2018); exploratory laparotomy (12/29/2018); and gastroscopy (1/6/2019).    SOCIAL HISTORY  Social History   Substance Use Topics   • Smoking status: Never Smoker   • Smokeless tobacco: Never Used      Comment: second hand smoke   • Alcohol use Yes      History   Drug Use     Comment: medical marijuana       FAMILY HISTORY  Family History   Problem Relation Age of Onset   • Lung Disease Mother         copd   • Diabetes Father    • Heart Disease Father    • Diabetes Sister    • Cancer Maternal Aunt         breast       CURRENT MEDICATIONS  No current facility-administered medications on file prior to encounter.      Current Outpatient Prescriptions on File Prior to Encounter   Medication Sig Dispense Refill   • rivaroxaban (XARELTO) 20 MG Tab tablet Take 1 Tab by mouth with dinner. 30 Tab 0   • rivaroxaban (XARELTO) 15 MG Tab tablet Take 1 Tab by mouth 2 times a day, with meals. 42 Tab 0   • furosemide (LASIX) 20 MG Tab Take 1 Tab by mouth every day. 30 Tab 0   • potassium bicarbonate (KLYTE) 25 MEQ tablet Take 1 Tab by mouth every day. 30 Tab 0   • prochlorperazine (COMPAZINE) 10 MG Tab Take 1 Tab by mouth every 6 hours as needed for  Nausea/Vomiting. 50 Tab 0   • MAGNESIUM CITRATE PO Take 1 Tab by mouth every 48 hours.     • albuterol (VENTOLIN OR PROVENTIL) 108 (90 BASE) MCG/ACT Aero Soln Inhale 2 Puffs by mouth every 6 hours as needed.     • Azelastine HCl 137 MCG/SPRAY Solution Calera 1 Spray in nose 2 times a day as needed (dry sinus).     • cetirizine (ZYRTEC) 10 MG Tab Take 10 mg by mouth 1 time daily as needed for Allergies.     • estradiol (ESTRACE) 0.1 MG/GM vaginal cream Insert 0.5 g in vagina every 72 hours.     • ipratropium-albuterol (DUONEB) 0.5-2.5 (3) MG/3ML nebulizer solution 3 mL by Nebulization route 4 times a day as needed for Shortness of Breath.     • metoprolol (LOPRESSOR) 25 MG Tab Take 25 mg by mouth 2 times a day.     • granisetron (KYTRIL) 1 MG Tab Take 1 Tab by mouth every 12 hours as needed for Nausea/Vomiting. 30 Tab 1   • oxycodone 20 MG/ML CONC Take 30 mg by mouth every four hours as needed (pain). 20mg/ml solution        ALLERGIES  Allergies   Allergen Reactions   • Azathioprine Sodium Hives   • Carafate [Sucralfate] Rash     Generalizes/Mouth Sores   • Infliximab Hives   • Morphine Swelling   • Roxanol [Morphine Sulfate] Swelling     Throat swells   • Amitriptyline Unspecified     Nightmares     • Demerol Vomiting   • Fentanyl Unspecified     Patches caused skin breakdown, no pain relief   • Lorazepam Unspecified     States give me nightmares.    • Methadone    • Methotrexate Hives and Rash   • Pregabalin Rash     Rash     • Pseudoephedrine Vomiting   • Sulfa Drugs Hives   • Tape Rash     Skin peels off; paper tape   • Celestone [Betamethasone Sodium Phosphate] Unspecified     Pt unable to remember   • Sulfasalazine Unspecified     Pt unable to remember   • Tizanidine Hydrochloride Unspecified     Pt unable to remember       PHYSICAL EXAM  VITAL SIGNS: BP (!) 99/73   Pulse (!) 122   Temp 36.6 °C (97.9 °F) (Temporal)   Resp 16   Wt 69 kg (152 lb 1.9 oz)   BMI 20.63 kg/m²     Constitutional: aggitated, Well  developed, mild acute distress, Non-toxic appearance.   HENT: Normocephalic, Atraumatic, Bilateral external ears normal, mucosa was dry, No oral exudates, Nose normal.   Eyes: PERRL, EOMI, Conjunctiva normal  Neck: Normal range of motion, No tenderness, Supple  Cardiovascular: Tachycardic heart rate, Normal rhythm  Thorax & Lungs: Normal breath sounds, No respiratory distress, , No chest tenderness.   Abdomen: diffuse abdominal tenderness, left iliostomy bag, no guarding no rebound, no pulsatile mass,  no distention,  Skin: Warm, Dry, No erythema, No rash.   Back: No tenderness, No CVA tenderness.   Extremities: Intact distal pulses, No edema, No tenderness   Neurologic: Alert & oriented x 3, Normal motor function, Normal sensory function, No focal deficits noted.   Psychiatric: Flight of ideas, tangential thinking      DIAGNOSTIC STUDIES / PROCEDURES\    LABS  All labs reviewed by me, See in chart.    EKG  12 lead EKG interpreted by me.     RADIOLOGY  DX-ABDOMEN COMPLETE WITH AP OR PA CXR   Final Result      No evidence of bowel obstruction.        The radiologist's interpretation of all radiological studies have been reviewed by me.    COURSE & MEDICAL DECISION MAKING  Nursing notes, VS, PMSFHx reviewed in chart.     Patient presents to the emergency department with vomiting and abdominal pain.  She has a history significant for Crohn's with multiple surgeries.  Patient is tachycardic here but is not hypotensive.  She is very difficult to keep on task to get a precise history of what is new or different.  She is afebrile here.  She does not have any abdominal distention and she has bowel sounds and I have low suspicion for obstruction.  But with her history of multiple surgeries and vomiting it is in the differential.  We discussed different imaging studies and patient absolutely refuses a CT scan at this time.  Therefore we will start with a plain film to rule out obstruction.  Patient's abdominal exam is  difficult as she is extremely tender to any kind of touch.  However the patient herself is quite dramatic and I do not think this represents ischemic bowel.  She denies any bloody stools and actually states she is had decreased diarrhea.  I do believe the patient is dehydrated.  She will receive IV fluids.  We will check labs including lactic acid and will continue to monitor closely.    4:04 PM Patient seen and examined at bedside.  Ordered for abdomen x-ray, CBC, CMP, lipase, lactic acid, urinalysis, and EKG to evaluate. Patient was treated with dilaudid 1 mg, zofran 4 mg, and NS IV 1000 ml for tachycardia and signs of dehydration for her symptoms. Discussed with the patient, we will rehydrate her and draw labs in order to evaluate her.     5:39 PM - Reviewed the patient's radiology results showing no bowel obstruction.      5:45 PM Reviewed lab results showing lactic acid 2.94.     5:52 PM On recheck, informed the patient of her lab and imaging results showing an elevated lactate. Due to this she will be admitted to the hospital once all of her labs resolve. Patient agrees with this.     Patient states she is having some mild spasm in her leg and therefore she was given a dose of oral diazepam.  Patient understands she will be admitted to the hospital for further monitoring.  B MP is still pending.  There is a delay as there is downtime with lab.    Discussed with Dr. Johnson who will evaluation the patient.      DISPOSITION:  Patient will be admitted to medicine in guarded condition.      FINAL IMPRESSION  1. Intractable vomiting with nausea, unspecified vomiting type    2. Crohn's disease of small and large intestines with complication (HCC)    3. Lactic acidosis    4. Dehydration          Caleb DE LA CRUZ (Gia), am scribing for, and in the presence of, Madyson Butler M.D..    Electronically signed by: Caleb Rocha), 5/19/2019    Madyson DE LA CRUZ M.D. personally performed the services described in  this documentation, as scribed by Caleb Vigil in my presence, and it is both accurate and complete. C.     The note accurately reflects work and decisions made by me.  Madyson Butler  5/19/2019  6:22 PM

## 2019-05-19 NOTE — ED TRIAGE NOTES
"Attempted to ask pt if able to call  for a ride home.  Pt requesting the \"Medicare team come in with the Vascular team.\"  Pt also requesting [facility] be called for a ride home.  Attempted to explain to pt that is not possible.  Pt requesting RN leave the room.    "

## 2019-05-20 PROBLEM — I10 HYPERTENSION: Status: ACTIVE | Noted: 2019-05-20

## 2019-05-20 LAB
ALBUMIN SERPL BCP-MCNC: 4.5 G/DL (ref 3.2–4.9)
ALBUMIN SERPL BCP-MCNC: 4.7 G/DL (ref 3.2–4.9)
ALBUMIN/GLOB SERPL: 1.3 G/DL
ALBUMIN/GLOB SERPL: 1.3 G/DL
ALP SERPL-CCNC: 122 U/L (ref 30–99)
ALP SERPL-CCNC: 137 U/L (ref 30–99)
ALT SERPL-CCNC: 25 U/L (ref 2–50)
ALT SERPL-CCNC: 36 U/L (ref 2–50)
ANION GAP SERPL CALC-SCNC: 13 MMOL/L (ref 0–11.9)
ANION GAP SERPL CALC-SCNC: 25 MMOL/L (ref 0–11.9)
AST SERPL-CCNC: 25 U/L (ref 12–45)
AST SERPL-CCNC: 27 U/L (ref 12–45)
BASOPHILS # BLD AUTO: 0.4 % (ref 0–1.8)
BASOPHILS # BLD AUTO: 0.6 % (ref 0–1.8)
BASOPHILS # BLD: 0.05 K/UL (ref 0–0.12)
BASOPHILS # BLD: 0.08 K/UL (ref 0–0.12)
BILIRUB SERPL-MCNC: 1 MG/DL (ref 0.1–1.5)
BILIRUB SERPL-MCNC: 1.1 MG/DL (ref 0.1–1.5)
BLOOD CULTURE HOLD CXBCH: NORMAL
BUN SERPL-MCNC: 15 MG/DL (ref 8–22)
BUN SERPL-MCNC: 21 MG/DL (ref 8–22)
CALCIUM SERPL-MCNC: 10.1 MG/DL (ref 8.5–10.5)
CALCIUM SERPL-MCNC: 10.8 MG/DL (ref 8.5–10.5)
CHLORIDE SERPL-SCNC: 104 MMOL/L (ref 96–112)
CHLORIDE SERPL-SCNC: 106 MMOL/L (ref 96–112)
CO2 SERPL-SCNC: 14 MMOL/L (ref 20–33)
CO2 SERPL-SCNC: 22 MMOL/L (ref 20–33)
CREAT SERPL-MCNC: 0.85 MG/DL (ref 0.5–1.4)
CREAT SERPL-MCNC: 1.13 MG/DL (ref 0.5–1.4)
CRP SERPL HS-MCNC: 1.13 MG/DL (ref 0–0.75)
EKG IMPRESSION: NORMAL
EOSINOPHIL # BLD AUTO: 0.01 K/UL (ref 0–0.51)
EOSINOPHIL # BLD AUTO: 0.02 K/UL (ref 0–0.51)
EOSINOPHIL NFR BLD: 0.1 % (ref 0–6.9)
EOSINOPHIL NFR BLD: 0.2 % (ref 0–6.9)
ERYTHROCYTE [DISTWIDTH] IN BLOOD BY AUTOMATED COUNT: 43.7 FL (ref 35.9–50)
ERYTHROCYTE [DISTWIDTH] IN BLOOD BY AUTOMATED COUNT: 45.6 FL (ref 35.9–50)
ERYTHROCYTE [SEDIMENTATION RATE] IN BLOOD BY WESTERGREN METHOD: 30 MM/HOUR (ref 0–30)
GLOBULIN SER CALC-MCNC: 3.5 G/DL (ref 1.9–3.5)
GLOBULIN SER CALC-MCNC: 3.6 G/DL (ref 1.9–3.5)
GLUCOSE SERPL-MCNC: 104 MG/DL (ref 65–99)
GLUCOSE SERPL-MCNC: 117 MG/DL (ref 65–99)
HCT VFR BLD AUTO: 42.7 % (ref 37–47)
HCT VFR BLD AUTO: 46 % (ref 37–47)
HGB BLD-MCNC: 13.9 G/DL (ref 12–16)
HGB BLD-MCNC: 15.5 G/DL (ref 12–16)
IMM GRANULOCYTES # BLD AUTO: 0.04 K/UL (ref 0–0.11)
IMM GRANULOCYTES # BLD AUTO: 0.05 K/UL (ref 0–0.11)
IMM GRANULOCYTES NFR BLD AUTO: 0.4 % (ref 0–0.9)
IMM GRANULOCYTES NFR BLD AUTO: 0.4 % (ref 0–0.9)
LACTATE BLD-SCNC: 1.4 MMOL/L (ref 0.5–2)
LACTATE BLD-SCNC: 2.9 MMOL/L (ref 0.5–2)
LIPASE SERPL-CCNC: 15 U/L (ref 11–82)
LYMPHOCYTES # BLD AUTO: 0.88 K/UL (ref 1–4.8)
LYMPHOCYTES # BLD AUTO: 0.88 K/UL (ref 1–4.8)
LYMPHOCYTES NFR BLD: 6.4 % (ref 22–41)
LYMPHOCYTES NFR BLD: 7.9 % (ref 22–41)
MAGNESIUM SERPL-MCNC: 1.9 MG/DL (ref 1.5–2.5)
MCH RBC QN AUTO: 29.5 PG (ref 27–33)
MCH RBC QN AUTO: 29.5 PG (ref 27–33)
MCHC RBC AUTO-ENTMCNC: 32.6 G/DL (ref 33.6–35)
MCHC RBC AUTO-ENTMCNC: 33.7 G/DL (ref 33.6–35)
MCV RBC AUTO: 87.5 FL (ref 81.4–97.8)
MCV RBC AUTO: 90.7 FL (ref 81.4–97.8)
MONOCYTES # BLD AUTO: 1.11 K/UL (ref 0–0.85)
MONOCYTES # BLD AUTO: 1.21 K/UL (ref 0–0.85)
MONOCYTES NFR BLD AUTO: 8.8 % (ref 0–13.4)
MONOCYTES NFR BLD AUTO: 9.9 % (ref 0–13.4)
NEUTROPHILS # BLD AUTO: 11.53 K/UL (ref 2–7.15)
NEUTROPHILS # BLD AUTO: 9.1 K/UL (ref 2–7.15)
NEUTROPHILS NFR BLD: 81.2 % (ref 44–72)
NEUTROPHILS NFR BLD: 83.7 % (ref 44–72)
NRBC # BLD AUTO: 0 K/UL
NRBC # BLD AUTO: 0 K/UL
NRBC BLD-RTO: 0 /100 WBC
NRBC BLD-RTO: 0 /100 WBC
PLATELET # BLD AUTO: 250 K/UL (ref 164–446)
PLATELET # BLD AUTO: 294 K/UL (ref 164–446)
PMV BLD AUTO: 10.1 FL (ref 9–12.9)
PMV BLD AUTO: 10.9 FL (ref 9–12.9)
POTASSIUM SERPL-SCNC: 3.5 MMOL/L (ref 3.6–5.5)
POTASSIUM SERPL-SCNC: 3.9 MMOL/L (ref 3.6–5.5)
PROT SERPL-MCNC: 8.1 G/DL (ref 6–8.2)
PROT SERPL-MCNC: 8.2 G/DL (ref 6–8.2)
RBC # BLD AUTO: 4.71 M/UL (ref 4.2–5.4)
RBC # BLD AUTO: 5.26 M/UL (ref 4.2–5.4)
SODIUM SERPL-SCNC: 139 MMOL/L (ref 135–145)
SODIUM SERPL-SCNC: 145 MMOL/L (ref 135–145)
TSH SERPL DL<=0.005 MIU/L-ACNC: 0.09 UIU/ML (ref 0.38–5.33)
WBC # BLD AUTO: 11.2 K/UL (ref 4.8–10.8)
WBC # BLD AUTO: 13.8 K/UL (ref 4.8–10.8)

## 2019-05-20 PROCEDURE — 700111 HCHG RX REV CODE 636 W/ 250 OVERRIDE (IP): Performed by: HOSPITALIST

## 2019-05-20 PROCEDURE — 92610 EVALUATE SWALLOWING FUNCTION: CPT

## 2019-05-20 PROCEDURE — A9270 NON-COVERED ITEM OR SERVICE: HCPCS | Performed by: HOSPITALIST

## 2019-05-20 PROCEDURE — 96376 TX/PRO/DX INJ SAME DRUG ADON: CPT

## 2019-05-20 PROCEDURE — 93010 ELECTROCARDIOGRAM REPORT: CPT | Performed by: INTERNAL MEDICINE

## 2019-05-20 PROCEDURE — 83735 ASSAY OF MAGNESIUM: CPT

## 2019-05-20 PROCEDURE — 93005 ELECTROCARDIOGRAM TRACING: CPT | Performed by: HOSPITALIST

## 2019-05-20 PROCEDURE — 96372 THER/PROPH/DIAG INJ SC/IM: CPT

## 2019-05-20 PROCEDURE — 84443 ASSAY THYROID STIM HORMONE: CPT

## 2019-05-20 PROCEDURE — 700105 HCHG RX REV CODE 258: Performed by: HOSPITALIST

## 2019-05-20 PROCEDURE — 700102 HCHG RX REV CODE 250 W/ 637 OVERRIDE(OP): Performed by: HOSPITALIST

## 2019-05-20 PROCEDURE — G0378 HOSPITAL OBSERVATION PER HR: HCPCS

## 2019-05-20 PROCEDURE — 96375 TX/PRO/DX INJ NEW DRUG ADDON: CPT

## 2019-05-20 PROCEDURE — 85025 COMPLETE CBC W/AUTO DIFF WBC: CPT

## 2019-05-20 PROCEDURE — 87040 BLOOD CULTURE FOR BACTERIA: CPT | Mod: 91

## 2019-05-20 PROCEDURE — 36415 COLL VENOUS BLD VENIPUNCTURE: CPT

## 2019-05-20 PROCEDURE — 99226 PR SUBSEQUENT OBSERVATION CARE,LEVEL III: CPT | Performed by: HOSPITALIST

## 2019-05-20 PROCEDURE — 80053 COMPREHEN METABOLIC PANEL: CPT

## 2019-05-20 PROCEDURE — 86140 C-REACTIVE PROTEIN: CPT

## 2019-05-20 PROCEDURE — 83605 ASSAY OF LACTIC ACID: CPT

## 2019-05-20 PROCEDURE — 85652 RBC SED RATE AUTOMATED: CPT

## 2019-05-20 RX ORDER — HYDROMORPHONE HYDROCHLORIDE 1 MG/ML
0.5 INJECTION, SOLUTION INTRAMUSCULAR; INTRAVENOUS; SUBCUTANEOUS ONCE
Status: ACTIVE | OUTPATIENT
Start: 2019-05-20 | End: 2019-05-21

## 2019-05-20 RX ORDER — HALOPERIDOL 5 MG/ML
3 INJECTION INTRAMUSCULAR EVERY 4 HOURS PRN
Status: DISCONTINUED | OUTPATIENT
Start: 2019-05-20 | End: 2019-05-22

## 2019-05-20 RX ORDER — LORAZEPAM 2 MG/ML
1 INJECTION INTRAMUSCULAR ONCE
Status: COMPLETED | OUTPATIENT
Start: 2019-05-20 | End: 2019-05-20

## 2019-05-20 RX ORDER — AMLODIPINE BESYLATE 5 MG/1
5 TABLET ORAL
Status: DISCONTINUED | OUTPATIENT
Start: 2019-05-20 | End: 2019-05-24 | Stop reason: HOSPADM

## 2019-05-20 RX ADMIN — LORAZEPAM 1 MG: 2 INJECTION INTRAMUSCULAR; INTRAVENOUS at 07:30

## 2019-05-20 RX ADMIN — HEPARIN SODIUM 5000 UNITS: 5000 INJECTION, SOLUTION INTRAVENOUS; SUBCUTANEOUS at 14:51

## 2019-05-20 RX ADMIN — AMLODIPINE BESYLATE 5 MG: 5 TABLET ORAL at 18:25

## 2019-05-20 RX ADMIN — HALOPERIDOL LACTATE 3 MG: 5 INJECTION, SOLUTION INTRAMUSCULAR at 22:32

## 2019-05-20 RX ADMIN — HYDRALAZINE HYDROCHLORIDE 20 MG: 20 INJECTION INTRAMUSCULAR; INTRAVENOUS at 18:16

## 2019-05-20 RX ADMIN — METOPROLOL TARTRATE 25 MG: 25 TABLET ORAL at 18:25

## 2019-05-20 RX ADMIN — SODIUM CHLORIDE: 9 INJECTION, SOLUTION INTRAVENOUS at 13:28

## 2019-05-20 RX ADMIN — HEPARIN SODIUM 5000 UNITS: 5000 INJECTION, SOLUTION INTRAVENOUS; SUBCUTANEOUS at 06:29

## 2019-05-20 ASSESSMENT — LIFESTYLE VARIABLES: EVER_SMOKED: NEVER

## 2019-05-20 ASSESSMENT — COPD QUESTIONNAIRES
HAVE YOU SMOKED AT LEAST 100 CIGARETTES IN YOUR ENTIRE LIFE: NO/DON'T KNOW
COPD SCREENING SCORE: 0
DURING THE PAST 4 WEEKS HOW MUCH DID YOU FEEL SHORT OF BREATH: NONE/LITTLE OF THE TIME
DO YOU EVER COUGH UP ANY MUCUS OR PHLEGM?: NO/ONLY WITH OCCASIONAL COLDS OR INFECTIONS

## 2019-05-20 ASSESSMENT — ENCOUNTER SYMPTOMS
PALPITATIONS: 0
VOMITING: 1
DIARRHEA: 0
FEVER: 0
SHORTNESS OF BREATH: 0
ABDOMINAL PAIN: 0
CHILLS: 0
COUGH: 0
CONSTIPATION: 1

## 2019-05-20 NOTE — ASSESSMENT & PLAN NOTE
Has been given diazepam in the emergency department.  Counseling, try to avoid benzos as possible.  May benefit from a psychiatry consult, was consulted on last admission  -monitor closely  -actively requesting haldol, will reduce dose to 1 mg Q4 hours PRN, tolerating this dosing well  -check EKG to determine QTc, which is ok

## 2019-05-20 NOTE — PROGRESS NOTES
2 RN skin check complete with James RN  Devices in place n/a  Skin assessed under devices   Confirmed pressure ulcers found on n/a.  New potential pressure ulcers noted on n/a. Wound consult placed n/a.  The following interventions in place moisturizer, freq reminders to turn/reposition.      Generalized redness   Ears red/blanching  Elbows red/blanching  BL UE bruising  Heels red/blanching  Sacrum/coccyx intact      Although pt states cannot ambulate she is able to reposition herself in bed. Frequent reminders to do so.

## 2019-05-20 NOTE — ASSESSMENT & PLAN NOTE
Metoprolol for rate control.  Currently sinus tachycardia, paroxysmally, no e/o AF/flutter  Continuous cardiac monitoring  Is not on anticoagulation currently.

## 2019-05-20 NOTE — ED NOTES
"Assumed care of pt at this time. Pt rang call light, upon entering room pt very hostile. Pt having flight of ideas, hard to follow conversation. Pt stating \"you have done nothing for me, I have gotten no nutrition or pain meds, all I want is ear plugs\", this RN tried to re-direct pt. Pt  Hard to re-direct back to situation. Pt still appears anxious and angry after multiple attempts. Pt awaiting inpt bed at this time.   "

## 2019-05-20 NOTE — THERAPY
Speech Language Therapy Clinical Swallow Evaluation completed.    Functional Status: Pt was seen at bedside for CSE. Pt familiar to this service and provider. Pt last seen by SLP services on 1/7/19 and was recommended for Wired jaw Dysphagia 1/NTL diet with sips of thins per tolerance (given EGD/GI results.) Pt was pleasant and participatory throughout session; however, was also distractible and tangential. Pt endorsed poor PO intake recently, with imminent nausea/vomiting after PO intake. Oral mech was unremarkable and no gross deficits were noted; however, Pt endorsed severe throat pain, secondary to 'sores all over.' Pt was agreeable to PO trials.     Pt was administered PO trials of thins (tsps amounts), NTL (tsps amounts), puree, pudding and soft/mixed solids. Pt demonstrated no coughing/choking and/or other clinical s/sx of aspiration/penetration with sips of thins and/or NTL; however, Pt endorsed significant odynophagia and demonstrated facial grimacing for each sip (Pt has previously demonstrated this behavior during other swallow evaluations, and this appears to be baseline.) Pt consumed applesauce and vanilla pudding without difficulties, and reported that 'they just slide right down.' Pt denied globus sensation and no overt clinical s/sx of aspiration/penetration were noted. Pt then consumed small bites of fruit cocktail (peaches, pears, pineapples) and demonstrated prolonged mastication and double swallows; however, no coughing/choking and/or other clinical s/sx of asp/pen were observed. Given Pt's hx of esophageal dysmotility and high risk for ascending aspiration secondary to EGD/GI results from Jan 2019, Pt is recommended for Wired Jaw Dysphagia 1 solids, with thin liquids and pills crushed in puree. Pt verbalized understanding of safe swallow precautions. May consider instrumental swallow evaluation (VFSS) to further characterize Pt's swallow function. RN aware. SLP following. Thank you for the  "consult.     **NOTE: Recommend referral to GI given Pt's current symptoms.    Recommendations - Diet: Wired jaw Dysphagia I, Thin Liquid,                           Strategies: Monitor during meals and Head of Bed at 90 Degrees                          Medication Administration: Crush all Medications in Puree    Plan of Care: Will benefit from Speech Therapy 3 times per week  Post-Acute Therapy: Recommend outpatient or home health transitional care services for continued speech therapy services    See \"Rehab Therapy-Acute\" Patient Summary Report for complete documentation.   "

## 2019-05-20 NOTE — ED NOTES
"Bedside report given to hi, pt still accusatory to staff, stating \"you haven't read my file, why dont you know everything about me\" pt still continues with flight of ideas. Pt to floor with CAMERON do.  "

## 2019-05-20 NOTE — PROGRESS NOTES
Attempted a dysphagia screen on pt. She states that she is willing to try and swallow so she can take PO medications. During first spoon full of water, she is unable to swallow and spits it out of her mouth. Updated Dr. Rossi that she is unable to take PO medications.   I also updated about elevated B/P and heart rate.    Pt IV is infiltrated, refusing to let me try to insert and new one. She is only agreeable to a new IV if it is put in with the ultra sound machine. Will have charge RN attempt an IV.

## 2019-05-20 NOTE — ASSESSMENT & PLAN NOTE
Likely secondary to dehydration   Does not appear to have focal signs of infection  I will hold on starting ABX at this point, and follow clinically cultures ordered.    -no change today

## 2019-05-20 NOTE — ASSESSMENT & PLAN NOTE
-appears decently contorlled  Monitor respiratory status closely  -h/o narcotic seeking behavior  -NO ESCALATION IN PAIN MEDICATIONS, spoke with Rx and will try and see if we can get her outpatient oral oxycodone solution approved with Rx manager

## 2019-05-20 NOTE — H&P
Hospital Medicine History & Physical Note    Date of Service  5/19/2019    Primary Care Physician  No primary care provider on file.    Consultants  N/A     Code Status  FULL     Chief Complaint  Weakness, nauseas and vomitting     History of Presenting Illness  63 y.o. female who past medical history of anxiety, chronic pain, chronic obstructive pulmonary disease not on home oxygen, Crohn's disease, chronic hypokalemia and paroxysmal atrial fibrillation not on anticoagulation presented 5/19/2019 with generalized weakness, palpitations, nausea and vomiting.  Patient reports that she has not been able to take her medications in the past 2 days as she was not able to keep anything down.  She describes her vomitus as clear liquid the containing recently ingested food.  No blood in vomitus.  It is associated with nausea.  She also reports having her usual abdominal pain that is described as moderate to severe rated as 6-10/10.  Diffuse, squeezing in nature.  The pain does not radiate to other places.  The pain is made worse by trying to eat food and by vomiting.  The patient is a very poor historian she is quite tangential and has flight of ideas.    Apparently the lab system is down today, however reportedly her lactic acid is 2.94, and her WBC is 13.76, hemoglobin reportedly is 15.5 which is higher than her baseline and platelets 294.  CMP is pending.    Review of Systems  Review of Systems   Constitutional: Positive for chills and malaise/fatigue. Negative for fever.   HENT: Negative for congestion and sore throat.    Eyes: Negative for pain, discharge and redness.   Respiratory: Negative for cough, hemoptysis, sputum production, shortness of breath and stridor.    Cardiovascular: Positive for palpitations. Negative for chest pain and leg swelling.   Gastrointestinal: Positive for abdominal pain, nausea and vomiting. Negative for blood in stool and diarrhea.   Genitourinary: Negative for flank pain, hematuria and  urgency.   Musculoskeletal: Negative for myalgias.   Skin: Negative.    Neurological: Negative for speech change, focal weakness, seizures and loss of consciousness.   Endo/Heme/Allergies: Does not bruise/bleed easily.   Psychiatric/Behavioral: Negative for hallucinations and suicidal ideas. The patient is not nervous/anxious.        Past Medical History  Anxiety  Chronic pain  Chronic obstructive pulmonary disease  Crohn's disease of colon   Hypokalemia   Atrial fibrillation    Surgical History   has a past surgical history that includes lumbar fusion anterior; cervical disk and fusion anterior; foot surgery; hand surgery; other abdominal surgery; gastroscopy with biopsy (11/22/08); ileostomy (11/11/2009); dilation and curettage (9/24/2010); gastroscopy (10/4/2011); colonoscopy (10/4/2011); hardware removal neuro (7/16/2012); mammoplasty reduction (7/17/2013); ileostomy (5/14/2014); pr reduction of large breast; abdominal exploration; lumpectomy; colon resection; ileostomy (12/29/2018); sigmoidoscopy flex (12/29/2018); exploratory laparotomy (12/29/2018); and gastroscopy (1/6/2019).     Family History  family history includes Cancer in her maternal aunt; Diabetes in her father and sister; Heart Disease in her father; Lung Disease in her mother.     Social History   reports that she has never smoked. She has never used smokeless tobacco. She reports that she drinks alcohol. She reports that she uses drugs.    Allergies  Allergies   Allergen Reactions   • Azathioprine Sodium Hives   • Carafate [Sucralfate] Rash     Generalizes/Mouth Sores   • Infliximab Hives   • Morphine Swelling   • Roxanol [Morphine Sulfate] Swelling     Throat swells   • Amitriptyline Unspecified     Nightmares     • Demerol Vomiting   • Fentanyl Unspecified     Patches caused skin breakdown, no pain relief   • Lorazepam Unspecified     States give me nightmares.    • Methadone    • Methotrexate Hives and Rash   • Pregabalin Rash     Rash     •  Pseudoephedrine Vomiting   • Sulfa Drugs Hives   • Tape Rash     Skin peels off; paper tape   • Celestone [Betamethasone Sodium Phosphate] Unspecified     Pt unable to remember   • Sulfasalazine Unspecified     Pt unable to remember   • Tizanidine Hydrochloride Unspecified     Pt unable to remember       Medications  Prior to Admission Medications   Prescriptions Last Dose Informant Patient Reported? Taking?   Azelastine HCl 137 MCG/SPRAY Solution  Patient Yes No   Sig: Spray 1 Spray in nose 2 times a day as needed (dry sinus).   MAGNESIUM CITRATE PO  Patient Yes No   Sig: Take 1 Tab by mouth every 48 hours.   albuterol (VENTOLIN OR PROVENTIL) 108 (90 BASE) MCG/ACT Aero Soln  Patient Yes No   Sig: Inhale 2 Puffs by mouth every 6 hours as needed.   cetirizine (ZYRTEC) 10 MG Tab  Patient Yes No   Sig: Take 10 mg by mouth 1 time daily as needed for Allergies.   estradiol (ESTRACE) 0.1 MG/GM vaginal cream  Patient Yes No   Sig: Insert 0.5 g in vagina every 72 hours.   furosemide (LASIX) 20 MG Tab   No No   Sig: Take 1 Tab by mouth every day.   granisetron (KYTRIL) 1 MG Tab  Patient No No   Sig: Take 1 Tab by mouth every 12 hours as needed for Nausea/Vomiting.   ipratropium-albuterol (DUONEB) 0.5-2.5 (3) MG/3ML nebulizer solution  Patient Yes No   Sig: 3 mL by Nebulization route 4 times a day as needed for Shortness of Breath.   metoprolol (LOPRESSOR) 25 MG Tab  Patient Yes No   Sig: Take 25 mg by mouth 2 times a day.   oxycodone 20 MG/ML CONC  Patient Yes No   Sig: Take 30 mg by mouth every four hours as needed (pain). 20mg/ml solution   potassium bicarbonate (KLYTE) 25 MEQ tablet   No No   Sig: Take 1 Tab by mouth every day.   prochlorperazine (COMPAZINE) 10 MG Tab   No No   Sig: Take 1 Tab by mouth every 6 hours as needed for Nausea/Vomiting.   rivaroxaban (XARELTO) 15 MG Tab tablet   No No   Sig: Take 1 Tab by mouth 2 times a day, with meals.   rivaroxaban (XARELTO) 20 MG Tab tablet   No No   Sig: Take 1 Tab by mouth  with dinner.      Facility-Administered Medications: None       Physical Exam  Temp:  [36.6 °C (97.9 °F)-37.4 °C (99.4 °F)] 36.6 °C (97.9 °F)  Pulse:  [116-138] 116  Resp:  [16] 16  BP: ()/(73-78) 99/73  SpO2:  [95 %-97 %] 97 %    Physical Exam   Constitutional: She is oriented to person, place, and time. No distress.   Cachectic   HENT:   Head: Normocephalic and atraumatic.   Right Ear: External ear normal.   Left Ear: External ear normal.   Eyes: Pupils are equal, round, and reactive to light. Conjunctivae are normal. Right eye exhibits no discharge. Left eye exhibits no discharge.   Neck: Normal range of motion. Neck supple. No JVD present. No tracheal deviation present. No thyromegaly present.   Cardiovascular: Exam reveals no gallop and no friction rub.    Murmur heard.  Tachycardia   Pulmonary/Chest: Effort normal and breath sounds normal. No stridor. No respiratory distress. She has no wheezes. She has no rales. She exhibits no tenderness.   Abdominal: Soft. Bowel sounds are normal. She exhibits no distension. There is tenderness. There is no rebound and no guarding.   Colostomy bag in place, no erythema or tenderness around colostomy site   Musculoskeletal: Normal range of motion. She exhibits no edema, tenderness or deformity.   Neurological: She is alert and oriented to person, place, and time. No cranial nerve deficit. Coordination normal.   Skin: Skin is warm and dry. No rash noted. She is not diaphoretic. No erythema. No pallor.   Psychiatric:   Anxious, tangential, with flight of ideas   Nursing note and vitals reviewed.    Laboratory:          No results for input(s): ALTSGPT, ASTSGOT, ALKPHOSPHAT, TBILIRUBIN, DBILIRUBIN, GAMMAGT, AMYLASE, LIPASE, ALB, PREALBUMIN, GLUCOSE in the last 72 hours.              No results for input(s): TROPONINI in the last 72 hours.    Apparently the lab system is down today, however reportedly her lactic acid is 2.94, and her WBC is 13.76, hemoglobin reportedly is  15.5 which is higher than her baseline and platelets 294.  CMP is pending.    Urinalysis:    No results found     Imaging:  DX-ABDOMEN COMPLETE WITH AP OR PA CXR   Final Result      No evidence of bowel obstruction.        Assessment/Plan:  I anticipate this patient is appropriate for observation status at this time.    Tachycardia- (present on admission)   Assessment & Plan    Likely secondary to dehydration.  Telemetry monitor shows sinus tachycardia.  I ordered EKG   Continuous cardiac monitoring  Expect to improve with intravenous fluids.     Dehydration- (present on admission)   Assessment & Plan    Secondary to nausea and vomiting and reduced oral intake.     Paroxysmal atrial fibrillation (HCC)- (present on admission)   Assessment & Plan    Metoprolol for rate control.  Currently sinus tachycardia.  Continuous cardiac monitoring  Is not on anticoagulation currently.     Lactic acidosis- (present on admission)   Assessment & Plan    Likely secondary to hypovolemic secondary to dehydration   I expect to improve with IFV. Does not appear to have focal signs of infection.    I will hold on starting ABX at this point, and follow clinically cultures ordered.        SIRS (systemic inflammatory response syndrome) (HCC)- (present on admission)   Assessment & Plan    Likely secondary to dehydration   Does not appear to have focal signs of infection  I will hold on starting ABX at this point, and follow clinically cultures ordered.       Anemia- (present on admission)   Assessment & Plan    Apparently the lab system is down today, however hemoglobin reportedly is 15.5 which is higher than her baseline this could represent dehydration.  Continue to monitor transfuse for hemoglobin less than 7.     Anxiety disorder due to multiple medical problems- (present on admission)   Assessment & Plan    Has been given diazepam in the emergency department.  Counseling, try to avoid benzos as possible.  May benefit from a psychiatry  consult in the a.m.     Crohn's disease of small intestine with complication (HCC)- (present on admission)   Assessment & Plan    Reports that her abdominal pain is at baseline  Has serous criteria, likely secondary to dehydration, however I am ordering CRP and sed rate to rule out a flare.  Supportive care with pain control intravenous fluids.     COPD (chronic obstructive pulmonary disease) (HCC)- (present on admission)   Assessment & Plan    Not currently in exacerbation  Oxygen as needed, Respiratory protocol, Incentive spirometry      Chronic pain- (present on admission)   Assessment & Plan    Pain control.  Monitor respiratory status closely         VTE prophylaxis: Heparin

## 2019-05-20 NOTE — ASSESSMENT & PLAN NOTE
Reports that her abdominal pain is at baseline, no clear clinical changes today again  ESR normal. AB xray unremarkable, no true clinical evidence of IBD flare at this time  Supportive care with pain control, good ostomy output, tolerating oral diet  -she demands Dr Cruz be consulted, I paged him, awaiting return call  Had ostomy placed in January by Dr. Cruz. Follows with Dr. Mahan for GI

## 2019-05-20 NOTE — PROGRESS NOTES
"Walked in to the pt's room, she is talking about her . When asked who she was talking to she states \"I'm talking to the tape recorded\". She continues to go on about \"signing everything away\".  "

## 2019-05-20 NOTE — CARE PLAN
Problem: Safety  Goal: Will remain free from injury  Outcome: PROGRESSING AS EXPECTED  Fall precautions in place. Bed in lowest position. Non-skid socks in place. Personal possessions within reach. Mobility sign on door. Bed-alarm on. Call light within reach. Pt educated regarding fall prevention and states understanding.       Problem: Knowledge Deficit  Goal: Knowledge of the prescribed therapeutic regimen will improve  Outcome: PROGRESSING AS EXPECTED  Pt educated regarding plan of care and medications. All questions answered.

## 2019-05-20 NOTE — PROGRESS NOTES
"Hospital Medicine Daily Progress Note    Date of Service  5/20/2019    Chief Complaint  63 y.o. female admitted 5/19/2019 with nausea and weakness    Hospital Course    63 y.o. female who past medical history of anxiety, chronic pain, chronic obstructive pulmonary disease not on home oxygen, Crohn's disease, chronic hypokalemia and paroxysmal atrial fibrillation not on anticoagulation presented 5/19/2019 with generalized weakness, palpitations, nausea and vomiting. She was found to have FLORES and started on IVF.       Interval Problem Update  Her nausea is resolved  Very agitated and aggressive overnight  She has a very strange affect  Continues to say she has had no output from her ostomy for several days when during my interview her ostomy bag has stool in it. When I told her this she said \"no that's all water, they've been giving me water and nothing is coming out of my ostomy and no one is doing anything about this\"  She also says she has vomited non stop for the last 3 days but has not had any episode of emesis since admission.   She says her symptoms feel like a crohn's flare, ESR is 30    abdo x ray with no evidence of obstruction     Consultants/Specialty  none    Code Status  Full code     Disposition  TBD    Review of Systems  Review of Systems   Constitutional: Negative for chills and fever.   Respiratory: Negative for cough and shortness of breath.    Cardiovascular: Negative for chest pain and palpitations.   Gastrointestinal: Positive for constipation (denies output from ostomy) and vomiting. Negative for abdominal pain and diarrhea.   Genitourinary: Negative for dysuria and urgency.        Physical Exam  Temp:  [36.2 °C (97.2 °F)-36.8 °C (98.2 °F)] 36.3 °C (97.3 °F)  Pulse:  [] 109  Resp:  [18-20] 18  BP: (128-170)/() 163/90  SpO2:  [95 %-99 %] 96 %    Physical Exam   Constitutional: She is oriented to person, place, and time. She appears well-developed and well-nourished.   HENT:   Head: " Normocephalic and atraumatic.   Eyes: Conjunctivae are normal. Right eye exhibits no discharge. Left eye exhibits no discharge.   Cardiovascular: Normal rate and regular rhythm.    No murmur heard.  Pulmonary/Chest: Effort normal. No respiratory distress.   Abdominal: Soft. Bowel sounds are normal. She exhibits no distension. There is no tenderness.   LLQ ostomy +   Musculoskeletal: She exhibits no edema or tenderness.   Neurological: She is alert and oriented to person, place, and time.   Skin: Skin is warm and dry.   Psychiatric:   Odd affect   Nursing note and vitals reviewed.      Fluids    Intake/Output Summary (Last 24 hours) at 05/20/19 1705  Last data filed at 05/19/19 1903   Gross per 24 hour   Intake             1000 ml   Output                0 ml   Net             1000 ml       Laboratory  Recent Labs      05/19/19   1720  05/20/19   0327   WBC  13.8*  11.2*   RBC  5.26  4.71   HEMOGLOBIN  15.5  13.9   HEMATOCRIT  46.0  42.7   MCV  87.5  90.7   MCH  29.5  29.5   MCHC  33.7  32.6*   RDW  43.7  45.6   PLATELETCT  294  250   MPV  10.1  10.9     Recent Labs      05/19/19   1720   SODIUM  139   POTASSIUM  3.9   CHLORIDE  104   CO2  22   GLUCOSE  117*   BUN  21   CREATININE  1.13   CALCIUM  10.8*                   Imaging  DX-ABDOMEN COMPLETE WITH AP OR PA CXR   Final Result      No evidence of bowel obstruction.           Assessment/Plan  Tachycardia- (present on admission)   Assessment & Plan    Likely secondary to dehydration.  Telemetry monitor shows sinus tachycardia.  I ordered TSH  Continuous cardiac monitoring  Expect to improve with intravenous fluids.     Dehydration- (present on admission)   Assessment & Plan    Secondary to nausea and vomiting and reduced oral intake.     Paroxysmal atrial fibrillation (HCC)- (present on admission)   Assessment & Plan    Metoprolol for rate control.  Currently sinus tachycardia.  Continuous cardiac monitoring  Is not on anticoagulation currently.     Lactic  acidosis- (present on admission)   Assessment & Plan    Likely secondary to hypovolemic secondary to dehydration   resovled with IVF      SIRS (systemic inflammatory response syndrome) (HCC)- (present on admission)   Assessment & Plan    Likely secondary to dehydration   Does not appear to have focal signs of infection  I will hold on starting ABX at this point, and follow clinically cultures ordered.       Anemia- (present on admission)   Assessment & Plan    Apparently the lab system is down today, however hemoglobin reportedly is 15.5 which is higher than her baseline this could represent dehydration.  Continue to monitor transfuse for hemoglobin less than 7.     Anxiety disorder due to multiple medical problems- (present on admission)   Assessment & Plan    Has been given diazepam in the emergency department.  Counseling, try to avoid benzos as possible.  May benefit from a psychiatry consult     Crohn's disease of small intestine with complication ([facility])- (present on admission)   Assessment & Plan    Reports that her abdominal pain is at baseline  ESR normal. Feels this is a flare but denies abdominal pain or diarrhea  Supportive care with pain control intravenous fluids.  Had ostomy placed in January by Dr. Cruz. Follows with Dr. Mahan for GI     Hypertension   Assessment & Plan    Only on lopressor at home  BP remains uncontrolled  I will add amlodipine  Labetalol and hydralazine PRN      ARF (acute renal failure) ([facility])- (present on admission)   Assessment & Plan    Appears pre renal etiology  Cont IVF  Repeat BMP in am     COPD (chronic obstructive pulmonary disease) ([facility])- (present on admission)   Assessment & Plan    Not currently in exacerbation  Oxygen as needed, Respiratory protocol, Incentive spirometry      Chronic pain- (present on admission)   Assessment & Plan    Pain control.  Monitor respiratory status closely          VTE prophylaxis: heparin

## 2019-05-20 NOTE — PROGRESS NOTES
Bedside report received from previous nurse regarding prior 12 hours.  Pt aggressive, verbally abusive and screaming at the top of her lungs.  Pt threatening to bring suit.  Says that [facility] is hurting her.  Pt talking to people hat are not in the room.  Dr. German notified.  Orders rec'd for EKG and 1mg IV ativan.  White board updated.    Call light within reach.

## 2019-05-20 NOTE — ASSESSMENT & PLAN NOTE
Likely secondary to dehydration.  Telemetry monitor shows sinus tachycardia, but overall improved, stop tele montoring  -TSH normal  -continue IVF's

## 2019-05-20 NOTE — PROGRESS NOTES
"Assumed care of pt, he is oriented tx4. However, she is hysterical and accusing staff of lying to her. She asks \"why wont you just be nice to me?\". Discussed POC ED RN. Unable to have a conversation about care with pt due to her fleeting thoughts. She was transferred to the floor on a zoll and confirmed ST with monitor tech upon arrival. Pt is demanding \" I need a K rider for my dehydration, I don't understand why nobody has given me that\". Educated about the process and POC. Pt states she is unable to take oral medications. Will do a dysphagia screen and assess N/V post medication administration. Discussed safety, pt refused socks initially but was agreeable after some education. Bed is locked in lowest position and call light/personal belongings are within reach.  "

## 2019-05-21 PROBLEM — R13.19 ESOPHAGEAL DYSPHAGIA: Status: ACTIVE | Noted: 2019-05-21

## 2019-05-21 LAB
ALBUMIN SERPL BCP-MCNC: 3.9 G/DL (ref 3.2–4.9)
ALBUMIN/GLOB SERPL: 1.3 G/DL
ALP SERPL-CCNC: 114 U/L (ref 30–99)
ALT SERPL-CCNC: 28 U/L (ref 2–50)
ANION GAP SERPL CALC-SCNC: 11 MMOL/L (ref 0–11.9)
ANION GAP SERPL CALC-SCNC: 14 MMOL/L (ref 0–11.9)
APPEARANCE UR: CLEAR
AST SERPL-CCNC: 21 U/L (ref 12–45)
BACTERIA #/AREA URNS HPF: NEGATIVE /HPF
BASOPHILS # BLD AUTO: 0.4 % (ref 0–1.8)
BASOPHILS # BLD: 0.04 K/UL (ref 0–0.12)
BILIRUB SERPL-MCNC: 0.9 MG/DL (ref 0.1–1.5)
BILIRUB UR QL STRIP.AUTO: ABNORMAL
BUN SERPL-MCNC: 16 MG/DL (ref 8–22)
BUN SERPL-MCNC: 17 MG/DL (ref 8–22)
CALCIUM SERPL-MCNC: 9.5 MG/DL (ref 8.5–10.5)
CALCIUM SERPL-MCNC: 9.9 MG/DL (ref 8.5–10.5)
CHLORIDE SERPL-SCNC: 108 MMOL/L (ref 96–112)
CHLORIDE SERPL-SCNC: 108 MMOL/L (ref 96–112)
CO2 SERPL-SCNC: 22 MMOL/L (ref 20–33)
CO2 SERPL-SCNC: 23 MMOL/L (ref 20–33)
COLOR UR: YELLOW
CREAT SERPL-MCNC: 0.78 MG/DL (ref 0.5–1.4)
CREAT SERPL-MCNC: 0.85 MG/DL (ref 0.5–1.4)
CRP SERPL HS-MCNC: 0.61 MG/DL (ref 0–0.75)
EOSINOPHIL # BLD AUTO: 0.01 K/UL (ref 0–0.51)
EOSINOPHIL NFR BLD: 0.1 % (ref 0–6.9)
EPI CELLS #/AREA URNS HPF: ABNORMAL /HPF
ERYTHROCYTE [DISTWIDTH] IN BLOOD BY AUTOMATED COUNT: 44.9 FL (ref 35.9–50)
GLOBULIN SER CALC-MCNC: 3 G/DL (ref 1.9–3.5)
GLUCOSE SERPL-MCNC: 96 MG/DL (ref 65–99)
GLUCOSE SERPL-MCNC: 98 MG/DL (ref 65–99)
GLUCOSE UR STRIP.AUTO-MCNC: NEGATIVE MG/DL
HCT VFR BLD AUTO: 42.8 % (ref 37–47)
HGB BLD-MCNC: 13.7 G/DL (ref 12–16)
HYALINE CASTS #/AREA URNS LPF: ABNORMAL /LPF
IMM GRANULOCYTES # BLD AUTO: 0.04 K/UL (ref 0–0.11)
IMM GRANULOCYTES NFR BLD AUTO: 0.4 % (ref 0–0.9)
KETONES UR STRIP.AUTO-MCNC: >=80 MG/DL
LEUKOCYTE ESTERASE UR QL STRIP.AUTO: NEGATIVE
LYMPHOCYTES # BLD AUTO: 1.41 K/UL (ref 1–4.8)
LYMPHOCYTES NFR BLD: 14.1 % (ref 22–41)
MAGNESIUM SERPL-MCNC: 2 MG/DL (ref 1.5–2.5)
MCH RBC QN AUTO: 28.2 PG (ref 27–33)
MCHC RBC AUTO-ENTMCNC: 32 G/DL (ref 33.6–35)
MCV RBC AUTO: 88.2 FL (ref 81.4–97.8)
MICRO URNS: ABNORMAL
MONOCYTES # BLD AUTO: 1.26 K/UL (ref 0–0.85)
MONOCYTES NFR BLD AUTO: 12.6 % (ref 0–13.4)
NEUTROPHILS # BLD AUTO: 7.24 K/UL (ref 2–7.15)
NEUTROPHILS NFR BLD: 72.4 % (ref 44–72)
NITRITE UR QL STRIP.AUTO: NEGATIVE
NRBC # BLD AUTO: 0 K/UL
NRBC BLD-RTO: 0 /100 WBC
PH UR STRIP.AUTO: 5.5 [PH]
PLATELET # BLD AUTO: 236 K/UL (ref 164–446)
PMV BLD AUTO: 10 FL (ref 9–12.9)
POTASSIUM SERPL-SCNC: 3.3 MMOL/L (ref 3.6–5.5)
POTASSIUM SERPL-SCNC: 3.3 MMOL/L (ref 3.6–5.5)
PROT SERPL-MCNC: 6.9 G/DL (ref 6–8.2)
PROT UR QL STRIP: NEGATIVE MG/DL
RBC # BLD AUTO: 4.85 M/UL (ref 4.2–5.4)
RBC # URNS HPF: ABNORMAL /HPF
RBC UR QL AUTO: ABNORMAL
SODIUM SERPL-SCNC: 142 MMOL/L (ref 135–145)
SODIUM SERPL-SCNC: 144 MMOL/L (ref 135–145)
SP GR UR REFRACTOMETRY: >=1.03
UROBILINOGEN UR STRIP.AUTO-MCNC: 0.2 MG/DL
WBC # BLD AUTO: 10 K/UL (ref 4.8–10.8)
WBC #/AREA URNS HPF: ABNORMAL /HPF

## 2019-05-21 PROCEDURE — 99233 SBSQ HOSP IP/OBS HIGH 50: CPT | Performed by: INTERNAL MEDICINE

## 2019-05-21 PROCEDURE — 80048 BASIC METABOLIC PNL TOTAL CA: CPT

## 2019-05-21 PROCEDURE — 700102 HCHG RX REV CODE 250 W/ 637 OVERRIDE(OP): Performed by: HOSPITALIST

## 2019-05-21 PROCEDURE — 700111 HCHG RX REV CODE 636 W/ 250 OVERRIDE (IP): Performed by: INTERNAL MEDICINE

## 2019-05-21 PROCEDURE — 700111 HCHG RX REV CODE 636 W/ 250 OVERRIDE (IP): Performed by: HOSPITALIST

## 2019-05-21 PROCEDURE — A9270 NON-COVERED ITEM OR SERVICE: HCPCS | Performed by: HOSPITALIST

## 2019-05-21 PROCEDURE — 36415 COLL VENOUS BLD VENIPUNCTURE: CPT

## 2019-05-21 PROCEDURE — 700105 HCHG RX REV CODE 258: Performed by: HOSPITALIST

## 2019-05-21 PROCEDURE — 96376 TX/PRO/DX INJ SAME DRUG ADON: CPT

## 2019-05-21 PROCEDURE — 302098 PASTE RING (FLAT): Performed by: INTERNAL MEDICINE

## 2019-05-21 PROCEDURE — 96372 THER/PROPH/DIAG INJ SC/IM: CPT

## 2019-05-21 PROCEDURE — 81001 URINALYSIS AUTO W/SCOPE: CPT

## 2019-05-21 PROCEDURE — 700102 HCHG RX REV CODE 250 W/ 637 OVERRIDE(OP): Performed by: INTERNAL MEDICINE

## 2019-05-21 PROCEDURE — 770001 HCHG ROOM/CARE - MED/SURG/GYN PRIV*

## 2019-05-21 PROCEDURE — 96375 TX/PRO/DX INJ NEW DRUG ADDON: CPT

## 2019-05-21 PROCEDURE — A9270 NON-COVERED ITEM OR SERVICE: HCPCS | Performed by: INTERNAL MEDICINE

## 2019-05-21 PROCEDURE — 85025 COMPLETE CBC W/AUTO DIFF WBC: CPT

## 2019-05-21 PROCEDURE — 92526 ORAL FUNCTION THERAPY: CPT

## 2019-05-21 RX ORDER — POTASSIUM CHLORIDE 20 MEQ/1
20 TABLET, EXTENDED RELEASE ORAL 3 TIMES DAILY
Status: DISCONTINUED | OUTPATIENT
Start: 2019-05-21 | End: 2019-05-22

## 2019-05-21 RX ADMIN — AMLODIPINE BESYLATE 5 MG: 5 TABLET ORAL at 04:42

## 2019-05-21 RX ADMIN — NYSTATIN 500000 UNITS: 100000 SUSPENSION ORAL at 20:10

## 2019-05-21 RX ADMIN — HALOPERIDOL LACTATE 3 MG: 5 INJECTION, SOLUTION INTRAMUSCULAR at 13:02

## 2019-05-21 RX ADMIN — POTASSIUM CHLORIDE 20 MEQ: 1500 TABLET, EXTENDED RELEASE ORAL at 16:05

## 2019-05-21 RX ADMIN — HALOPERIDOL LACTATE 3 MG: 5 INJECTION, SOLUTION INTRAMUSCULAR at 07:46

## 2019-05-21 RX ADMIN — HEPARIN SODIUM 5000 UNITS: 5000 INJECTION, SOLUTION INTRAVENOUS; SUBCUTANEOUS at 04:44

## 2019-05-21 RX ADMIN — HEPARIN SODIUM 5000 UNITS: 5000 INJECTION, SOLUTION INTRAVENOUS; SUBCUTANEOUS at 13:02

## 2019-05-21 RX ADMIN — SODIUM CHLORIDE: 9 INJECTION, SOLUTION INTRAVENOUS at 14:05

## 2019-05-21 RX ADMIN — METOPROLOL TARTRATE 25 MG: 25 TABLET ORAL at 18:08

## 2019-05-21 RX ADMIN — HEPARIN SODIUM 5000 UNITS: 5000 INJECTION, SOLUTION INTRAVENOUS; SUBCUTANEOUS at 20:10

## 2019-05-21 RX ADMIN — NYSTATIN 500000 UNITS: 100000 SUSPENSION ORAL at 18:08

## 2019-05-21 RX ADMIN — PROCHLORPERAZINE EDISYLATE 10 MG: 5 INJECTION INTRAMUSCULAR; INTRAVENOUS at 18:09

## 2019-05-21 RX ADMIN — NYSTATIN 500000 UNITS: 100000 SUSPENSION ORAL at 13:34

## 2019-05-21 RX ADMIN — HALOPERIDOL LACTATE 3 MG: 5 INJECTION, SOLUTION INTRAMUSCULAR at 18:09

## 2019-05-21 RX ADMIN — METOPROLOL TARTRATE 25 MG: 25 TABLET ORAL at 04:42

## 2019-05-21 RX ADMIN — HYDROMORPHONE HYDROCHLORIDE 2 MG: 2 TABLET ORAL at 04:28

## 2019-05-21 RX ADMIN — PROCHLORPERAZINE EDISYLATE 10 MG: 5 INJECTION INTRAMUSCULAR; INTRAVENOUS at 10:07

## 2019-05-21 ASSESSMENT — ENCOUNTER SYMPTOMS
WEAKNESS: 1
ABDOMINAL PAIN: 0
DIZZINESS: 0
NAUSEA: 1
MYALGIAS: 0
COUGH: 0
CONSTIPATION: 1
PALPITATIONS: 0
FEVER: 0
DIARRHEA: 0
NECK PAIN: 0
VOMITING: 1
NERVOUS/ANXIOUS: 1

## 2019-05-21 ASSESSMENT — COGNITIVE AND FUNCTIONAL STATUS - GENERAL
MOBILITY SCORE: 15
SUGGESTED CMS G CODE MODIFIER MOBILITY: CK
TOILETING: A LITTLE
DRESSING REGULAR LOWER BODY CLOTHING: A LITTLE
WALKING IN HOSPITAL ROOM: A LOT
STANDING UP FROM CHAIR USING ARMS: A LITTLE
MOBILITY SCORE: 18
MOVING TO AND FROM BED TO CHAIR: A LITTLE
STANDING UP FROM CHAIR USING ARMS: A LITTLE
EATING MEALS: A LITTLE
PERSONAL GROOMING: A LITTLE
MOVING TO AND FROM BED TO CHAIR: A LITTLE
DRESSING REGULAR UPPER BODY CLOTHING: A LITTLE
DRESSING REGULAR LOWER BODY CLOTHING: A LITTLE
SUGGESTED CMS G CODE MODIFIER DAILY ACTIVITY: CK
CLIMB 3 TO 5 STEPS WITH RAILING: A LOT
DRESSING REGULAR UPPER BODY CLOTHING: A LITTLE
TOILETING: A LITTLE
CLIMB 3 TO 5 STEPS WITH RAILING: A LITTLE
MOVING FROM LYING ON BACK TO SITTING ON SIDE OF FLAT BED: A LOT
WALKING IN HOSPITAL ROOM: A LITTLE
MOVING FROM LYING ON BACK TO SITTING ON SIDE OF FLAT BED: A LITTLE
HELP NEEDED FOR BATHING: A LITTLE
SUGGESTED CMS G CODE MODIFIER MOBILITY: CK
EATING MEALS: A LITTLE
DAILY ACTIVITIY SCORE: 18
TURNING FROM BACK TO SIDE WHILE IN FLAT BAD: A LITTLE
TURNING FROM BACK TO SIDE WHILE IN FLAT BAD: A LITTLE
HELP NEEDED FOR BATHING: A LITTLE

## 2019-05-21 NOTE — ASSESSMENT & PLAN NOTE
Only on lopressor at home  -BP remains well controlled  -continue added norvasc, but no dose escalation today  Labetalol and hydralazine PRN

## 2019-05-21 NOTE — CONSULTS
DATE OF SERVICE:  05/21/2019    REASON FOR CONSULTATION:  Esophageal dysphagia.    REQUESTING PHYSICIAN:  Félix Palmer MD    HISTORY OF PRESENT ILLNESS:  Thank you very much for asking me to see this   very pleasant 63-year-old lady who is known to our service from previous   admits and she follows with Dr. Mahan in the office.  She is a somewhat   difficult historian and has a very complex history from a GI and surgery   standpoint.  On this occasion, I am asked to see the patient for recurrence of   her chronic intermittent esophageal dysphagia.  At the age of 14, she was   diagnosed with Crohn's disease.  She has a history of ileal proctocolectomy   with an ileal reservoir, which has since been taken down.  In January, she   underwent lysis of adhesions, ileostomy with repair of parastomal hernia and a   placement of homograft.  She has a history of difficulty swallowing and in   January underwent EGD by my group.  A 51-[place] Savary dilator was passed and   she reports that that gave her excellent relief of her dysphagia; however,   just recently, she started having more difficulty swallowing.  Previous upper   GI series showed narrowing at the distal esophagus.  There was dysmotility.    On this occasion, she was admitted with severe nausea and vomiting and has   been treated for this.  The nausea and vomiting have resolved, but once again,   she does complain of this persistent esophageal dysphagia.    REVIEW OF SYSTEMS:  A 10-system review of systems performed by myself,   pertinent positives and negatives stated, otherwise noncontributory.    PAST MEDICAL HISTORY:  1.  Crohn's disease.  2.  COPD.  3.  Chronic pain.  4.  Anxiety.  5.  Atrial fibrillation.  6.  Seizure disorder.  7.  Neuralgia.    PAST SURGICAL HISTORY:  1.  Ileostomy revision in 2014 and 2019.  2.  Lumber fusion.  3.  Cervical fusion.  4.  Foot surgery.  5.  Hand surgery.  6.  Ileoscopy.  7.  Gastroscopy in 2011 and again in  01/2019.    ADVERSE DRUG REACTIONS:  NUMEROUS.  THESE INCLUDE, BUT ARE NOT LIMITED TO   TIZANIDINE, SULFASALAZINE, CELESTONE, TAPE, SULFA DRUGS, AZATHIOPRINE,   CARAFATE, INFLIXIMAB, MORPHINE, ROXANOL, AMITRIPTYLINE, DEMEROL,   PSEUDOEPHEDRINE, PREGABALIN, METHOTREXATE, METHADONE, LORAZEPAM, FENTANYL.    MEDICATIONS:  Reviewed by myself.  Please see the chart for detail.    SOCIAL HISTORY:  Does not smoke tobacco or use alcohol.    FAMILY HISTORY:  Includes multiple family members who reportedly have Crohn's   disease.  There is no history of pancreatic or colonic issues.    LABORATORY DATA:  White count 10.0, hemoglobin 13.7, hematocrit 42.8,   platelets 236.  Sodium 144, potassium 3.3, chloride 108, bicarbonate 22, BUN   16, creatinine 0.85, AST 25, ALT 25, alkaline phosphatase 122, total bilirubin   1.0.    PHYSICAL EXAMINATION:  GENERAL:  She is alert, oriented x3, in no acute distress, somewhat anxious in   appearance.  VITAL SIGNS:  Temperature 98.2, pulse of 101, blood pressure 141/96.  HEENT:  Sclerae nonicteric.  Mucous membranes are pink and moist.  No head and   neck adenopathy.  LUNGS:  Clear bilaterally.  HEART:  Sounds regular.  ABDOMEN:  Soft, nontender, nondistended.  Bowel sounds normal.  Ostomy is   pink, patent, and productive.    ASSESSMENT:  1.  Esophageal dysphagia.  2.  Nausea and vomiting, improved.  3.  Crohn's disease.  4.  Ileostomy status.  5.  Anxiety.  6.  Chronic obstructive pulmonary disease.  7.  Atrial fibrillation.  8.  Chronic pain.    DISCUSSION:  She has Crohn's disease and multiple other issues going on, but   from a GI standpoint now, her main issue is esophageal dysphagia and that is   what will be dealt with.    PLAN:  1.  Keep n.p.o.  2.  EGD with dilation.  This will have to be done in the OR given her multiple   drug sensitivities and chronic pain.  3.  Continue supportive care and antiemetics.    Thank you very much for allowing me to participate in the care of this nice    lady.  If you have any questions, please do not hesitate to call.       ____________________________________     MD TOO POP / TUCKER    DD:  05/21/2019 12:08:44  DT:  05/21/2019 12:44:09    D#:  [id number]  Job#:  [id number]

## 2019-05-21 NOTE — PROGRESS NOTES
2 RN skin check complete with Dorys BARNES  Devices in place n/a  Skin assessed under devices   Confirmed pressure ulcers found on n/a.  New potential pressure ulcers noted on n/a. Wound consult placed n/a.  The following interventions in place moisturizer, freq reminders to turn/reposition.        Generalized redness   Ears red/blanching  Elbows red/blanching  BL UE bruising  Heels red/blanching  Sacrum/coccyx intact        Patient turns self in bed and is up to commode today. Patient encouraged to site in Chair but refused.

## 2019-05-21 NOTE — DISCHARGE PLANNING
Anticipated Discharge Disposition: SNF    Action: Assessment completed. LSW spoke with pt at bedside. Pt stated she was able to perform all of her ADLs prior to admission but has been on disability for the last 20 years. Pt used to be on service with [facility] but does not want to return with them. Pt also used to be on O2 but no longer needs home O2.     LSW informed pt of SLP's recommendation of SNF. Pt stated she does not want to go to a SNF and wants to discharge home with her . LSW stressed the importance of continued therapy. LSW provided pt with a list of local HH agencies and medicare.gov ratings.     Barriers to Discharge: None    Plan: Awaiting medical clearance. Awaiting PT/OT recommendations.     Care Transition Team Assessment    Information Source  Orientation : Oriented x 4  Information Given By: Patient  Informant's Name: Jana  Who is responsible for making decisions for patient? : Patient    Readmission Evaluation  Is this a readmission?: Yes - unplanned readmission    Elopement Risk  Legal Hold: No  Ambulatory or Self Mobile in Wheelchair: No-Not an Elopement Risk  Elopement Risk: Not at Risk for Elopement    Discharge Preparedness  What is your plan after discharge?: Uncertain - pending medical team collaboration  What are your discharge supports?: Spouse  Prior Functional Level: Independent with Activities of Daily Living, Independent with Medication Management    Functional Assesment  Prior Functional Level: Independent with Activities of Daily Living, Independent with Medication Management    Finances  Financial Barriers to Discharge: No    Domestic Abuse  Have you ever been the victim of abuse or violence?: No    Psychological Assessment  History of Psychiatric Problems: Yes (Anxiety)    Discharge Risks or Barriers  Discharge risks or barriers?: Complex medical needs  Patient risk factors: Complex medical needs, Vulnerable adult    Anticipated Discharge Information  Anticipated  discharge disposition: [facility], SNF

## 2019-05-21 NOTE — PROGRESS NOTES
Pt AOx4, odd affect, elaborate stories and ideas. Pt c/o generalized pain, denied pain medications, but frequently requesting PRN Haldol for abdominal cramps and anxiety. Pt started on Nystatin for sore mouth. Continues on Heparin for DVT prophylaxis. OOB to bathroom x1 today, unsteady on her feet requires 1 assist. On IVF at 83 mL/hr. Pt request Potassium replacement until after lunch, will attempt to give again. RA with adequate sats, BS present, stoma intact to LLQ, green liquid output noted. C/o N/V, but no vomiting witnessed, given Compazine for nausea. Plan for EGD tomorrow, NPO after midnight per MD Cardona reports.

## 2019-05-21 NOTE — WOUND TEAM
"Patient seen by wound team per consult order. Patient has her own 2 piece convex, convatec supplies at bedside, no paste ring. Patient requested that this wound RN watch her change appliance, however, she has been independent with ostomy care at home. This RN watched as she sat on the toilet and changed appliance. She has her own supplies, does not want Jasmyne supplies. There are no ostomy needs for this patient, stoma is red, protruded greater than 1\" and no chelesa stomal skin breakdown. Discussed POC with staff RN, ostomy care prn ordered, nursing to assist as needed.  "

## 2019-05-21 NOTE — THERAPY
"Speech Language Therapy dysphagia treatment completed.     Functional Status:  Patient sleeping, but easily awakened and eager for PO intake. Asks for cold food items to ease oropharyngeal soreness. Pt reports Nystatin has eased said odynophagia. Pt indicates location of intraoral sores, but none visualized. Pt administered PO trials soft, chopped texture (fruit cocktail) which was tolerated without clinical s/o aspiration, however pt reported esophageal globus and a burning sensation from pharyngeal sores. She consumed thin liquids, sherbet, and pudding without difficulty and expressed a desire to continue the puree/wired jaw diet. Pt is to be NPO after midnight for UED tomorrow (5/22/19). ST department will continue to follow.     Recommendations: 1) Continue Wired jaw Dysphagia I, thin liquid diet. 2) HOB at 90 degrees for PO intake, remain upright 45-60 minutes after PO Intake to decrease aspiration risk of esophageal contents.     Plan of Care: Will benefit from Speech Therapy 3 times per week    Post-Acute Therapy: Discharge to home with outpatient or home health for additional skilled therapy services.    See \"Rehab Therapy-Acute\" Patient Summary Report for complete documentation.     "

## 2019-05-21 NOTE — PROGRESS NOTES
Atrial Fibrillation Admission Alert:    Your patient has been identified through our atrial fibrillation admission alert tracking system to have the diagnosis of atrial fibrillation.    After chart review, it was noted that this patient doesn't meet criteria to have a primary diagnosis of atrial fibrillation.    A message has been sent to the attending MD to change this diagnosis.

## 2019-05-21 NOTE — PROGRESS NOTES
Patrick with Dr. Rossi at 2310. New orders per provider and verified with Pharmacy.    2319: Pt sleeping at this time. No medication given. Will continue to monitor

## 2019-05-21 NOTE — CARE PLAN
Problem: Venous Thromboembolism (VTW)/Deep Vein Thrombosis (DVT) Prevention:  Goal: Patient will participate in Venous Thrombosis (VTE)/Deep Vein Thrombosis (DVT)Prevention Measures  Outcome: PROGRESSING SLOWER THAN EXPECTED      Problem: Knowledge Deficit  Goal: Knowledge of the prescribed therapeutic regimen will improve  Outcome: PROGRESSING SLOWER THAN EXPECTED      Problem: Discharge Barriers/Planning  Goal: Patient's continuum of care needs will be met  Outcome: PROGRESSING AS EXPECTED

## 2019-05-22 ENCOUNTER — ANESTHESIA (OUTPATIENT)
Dept: SURGERY | Facility: MEDICAL CENTER | Age: 64
DRG: 641 | End: 2019-05-22
Payer: MEDICARE

## 2019-05-22 ENCOUNTER — ANESTHESIA EVENT (OUTPATIENT)
Dept: SURGERY | Facility: MEDICAL CENTER | Age: 64
DRG: 641 | End: 2019-05-22
Payer: MEDICARE

## 2019-05-22 LAB
ANION GAP SERPL CALC-SCNC: 9 MMOL/L (ref 0–11.9)
BASOPHILS # BLD AUTO: 0.8 % (ref 0–1.8)
BASOPHILS # BLD: 0.06 K/UL (ref 0–0.12)
BUN SERPL-MCNC: 13 MG/DL (ref 8–22)
CALCIUM SERPL-MCNC: 10 MG/DL (ref 8.5–10.5)
CHLORIDE SERPL-SCNC: 109 MMOL/L (ref 96–112)
CO2 SERPL-SCNC: 24 MMOL/L (ref 20–33)
CREAT SERPL-MCNC: 0.76 MG/DL (ref 0.5–1.4)
EOSINOPHIL # BLD AUTO: 0.11 K/UL (ref 0–0.51)
EOSINOPHIL NFR BLD: 1.5 % (ref 0–6.9)
ERYTHROCYTE [DISTWIDTH] IN BLOOD BY AUTOMATED COUNT: 45.1 FL (ref 35.9–50)
GLUCOSE SERPL-MCNC: 115 MG/DL (ref 65–99)
HCT VFR BLD AUTO: 42.2 % (ref 37–47)
HGB BLD-MCNC: 13.6 G/DL (ref 12–16)
IMM GRANULOCYTES # BLD AUTO: 0.03 K/UL (ref 0–0.11)
IMM GRANULOCYTES NFR BLD AUTO: 0.4 % (ref 0–0.9)
LYMPHOCYTES # BLD AUTO: 1.22 K/UL (ref 1–4.8)
LYMPHOCYTES NFR BLD: 17 % (ref 22–41)
MAGNESIUM SERPL-MCNC: 1.8 MG/DL (ref 1.5–2.5)
MCH RBC QN AUTO: 28.8 PG (ref 27–33)
MCHC RBC AUTO-ENTMCNC: 32.2 G/DL (ref 33.6–35)
MCV RBC AUTO: 89.4 FL (ref 81.4–97.8)
MONOCYTES # BLD AUTO: 0.93 K/UL (ref 0–0.85)
MONOCYTES NFR BLD AUTO: 13 % (ref 0–13.4)
NEUTROPHILS # BLD AUTO: 4.82 K/UL (ref 2–7.15)
NEUTROPHILS NFR BLD: 67.3 % (ref 44–72)
NRBC # BLD AUTO: 0 K/UL
NRBC BLD-RTO: 0 /100 WBC
PLATELET # BLD AUTO: 220 K/UL (ref 164–446)
PMV BLD AUTO: 10.2 FL (ref 9–12.9)
POTASSIUM SERPL-SCNC: 3.3 MMOL/L (ref 3.6–5.5)
RBC # BLD AUTO: 4.72 M/UL (ref 4.2–5.4)
SODIUM SERPL-SCNC: 142 MMOL/L (ref 135–145)
WBC # BLD AUTO: 7.2 K/UL (ref 4.8–10.8)

## 2019-05-22 PROCEDURE — 93005 ELECTROCARDIOGRAM TRACING: CPT | Performed by: INTERNAL MEDICINE

## 2019-05-22 PROCEDURE — 160009 HCHG ANES TIME/MIN: Performed by: INTERNAL MEDICINE

## 2019-05-22 PROCEDURE — 700111 HCHG RX REV CODE 636 W/ 250 OVERRIDE (IP): Performed by: ANESTHESIOLOGY

## 2019-05-22 PROCEDURE — 160035 HCHG PACU - 1ST 60 MINS PHASE I: Performed by: INTERNAL MEDICINE

## 2019-05-22 PROCEDURE — 160048 HCHG OR STATISTICAL LEVEL 1-5: Performed by: INTERNAL MEDICINE

## 2019-05-22 PROCEDURE — 94664 DEMO&/EVAL PT USE INHALER: CPT

## 2019-05-22 PROCEDURE — 700111 HCHG RX REV CODE 636 W/ 250 OVERRIDE (IP)

## 2019-05-22 PROCEDURE — 700105 HCHG RX REV CODE 258: Performed by: ANESTHESIOLOGY

## 2019-05-22 PROCEDURE — 0D738ZZ DILATION OF LOWER ESOPHAGUS, VIA NATURAL OR ARTIFICIAL OPENING ENDOSCOPIC: ICD-10-PCS | Performed by: INTERNAL MEDICINE

## 2019-05-22 PROCEDURE — 83735 ASSAY OF MAGNESIUM: CPT

## 2019-05-22 PROCEDURE — 0DJ08ZZ INSPECTION OF UPPER INTESTINAL TRACT, VIA NATURAL OR ARTIFICIAL OPENING ENDOSCOPIC: ICD-10-PCS | Performed by: INTERNAL MEDICINE

## 2019-05-22 PROCEDURE — 93010 ELECTROCARDIOGRAM REPORT: CPT | Performed by: INTERNAL MEDICINE

## 2019-05-22 PROCEDURE — 700102 HCHG RX REV CODE 250 W/ 637 OVERRIDE(OP): Performed by: INTERNAL MEDICINE

## 2019-05-22 PROCEDURE — 80048 BASIC METABOLIC PNL TOTAL CA: CPT

## 2019-05-22 PROCEDURE — A9270 NON-COVERED ITEM OR SERVICE: HCPCS | Performed by: HOSPITALIST

## 2019-05-22 PROCEDURE — 36415 COLL VENOUS BLD VENIPUNCTURE: CPT

## 2019-05-22 PROCEDURE — 770001 HCHG ROOM/CARE - MED/SURG/GYN PRIV*

## 2019-05-22 PROCEDURE — 700111 HCHG RX REV CODE 636 W/ 250 OVERRIDE (IP): Performed by: INTERNAL MEDICINE

## 2019-05-22 PROCEDURE — 160002 HCHG RECOVERY MINUTES (STAT): Performed by: INTERNAL MEDICINE

## 2019-05-22 PROCEDURE — 700102 HCHG RX REV CODE 250 W/ 637 OVERRIDE(OP): Performed by: HOSPITALIST

## 2019-05-22 PROCEDURE — 0D728ZZ DILATION OF MIDDLE ESOPHAGUS, VIA NATURAL OR ARTIFICIAL OPENING ENDOSCOPIC: ICD-10-PCS | Performed by: INTERNAL MEDICINE

## 2019-05-22 PROCEDURE — 700111 HCHG RX REV CODE 636 W/ 250 OVERRIDE (IP): Performed by: HOSPITALIST

## 2019-05-22 PROCEDURE — 85025 COMPLETE CBC W/AUTO DIFF WBC: CPT

## 2019-05-22 PROCEDURE — 160036 HCHG PACU - EA ADDL 30 MINS PHASE I: Performed by: INTERNAL MEDICINE

## 2019-05-22 PROCEDURE — 700105 HCHG RX REV CODE 258: Performed by: HOSPITALIST

## 2019-05-22 PROCEDURE — 99232 SBSQ HOSP IP/OBS MODERATE 35: CPT | Performed by: INTERNAL MEDICINE

## 2019-05-22 PROCEDURE — 500066 HCHG BITE BLOCK, ECT: Performed by: INTERNAL MEDICINE

## 2019-05-22 PROCEDURE — 92526 ORAL FUNCTION THERAPY: CPT

## 2019-05-22 PROCEDURE — 160203 HCHG ENDO MINUTES - 1ST 30 MINS LEVEL 4: Performed by: INTERNAL MEDICINE

## 2019-05-22 PROCEDURE — A9270 NON-COVERED ITEM OR SERVICE: HCPCS | Performed by: INTERNAL MEDICINE

## 2019-05-22 PROCEDURE — 700101 HCHG RX REV CODE 250: Performed by: ANESTHESIOLOGY

## 2019-05-22 RX ORDER — OXYCODONE HCL 5 MG/5 ML
5 SOLUTION, ORAL ORAL
Status: DISCONTINUED | OUTPATIENT
Start: 2019-05-22 | End: 2019-05-22 | Stop reason: HOSPADM

## 2019-05-22 RX ORDER — HYDROMORPHONE HYDROCHLORIDE 1 MG/ML
INJECTION, SOLUTION INTRAMUSCULAR; INTRAVENOUS; SUBCUTANEOUS
Status: COMPLETED
Start: 2019-05-22 | End: 2019-05-22

## 2019-05-22 RX ORDER — ONDANSETRON 2 MG/ML
4 INJECTION INTRAMUSCULAR; INTRAVENOUS
Status: DISCONTINUED | OUTPATIENT
Start: 2019-05-22 | End: 2019-05-22 | Stop reason: HOSPADM

## 2019-05-22 RX ORDER — POTASSIUM CHLORIDE 7.45 MG/ML
10 INJECTION INTRAVENOUS
Status: DISPENSED | OUTPATIENT
Start: 2019-05-22 | End: 2019-05-22

## 2019-05-22 RX ORDER — SODIUM CHLORIDE, SODIUM LACTATE, POTASSIUM CHLORIDE, CALCIUM CHLORIDE 600; 310; 30; 20 MG/100ML; MG/100ML; MG/100ML; MG/100ML
INJECTION, SOLUTION INTRAVENOUS CONTINUOUS
Status: DISCONTINUED | OUTPATIENT
Start: 2019-05-22 | End: 2019-05-22 | Stop reason: HOSPADM

## 2019-05-22 RX ORDER — SODIUM CHLORIDE, SODIUM LACTATE, POTASSIUM CHLORIDE, CALCIUM CHLORIDE 600; 310; 30; 20 MG/100ML; MG/100ML; MG/100ML; MG/100ML
INJECTION, SOLUTION INTRAVENOUS
Status: DISCONTINUED | OUTPATIENT
Start: 2019-05-22 | End: 2019-05-22 | Stop reason: SURG

## 2019-05-22 RX ORDER — HALOPERIDOL 5 MG/ML
1 INJECTION INTRAMUSCULAR
Status: DISCONTINUED | OUTPATIENT
Start: 2019-05-22 | End: 2019-05-22 | Stop reason: HOSPADM

## 2019-05-22 RX ORDER — HALOPERIDOL 5 MG/ML
1 INJECTION INTRAMUSCULAR EVERY 4 HOURS PRN
Status: DISCONTINUED | OUTPATIENT
Start: 2019-05-22 | End: 2019-05-24 | Stop reason: HOSPADM

## 2019-05-22 RX ORDER — POTASSIUM CHLORIDE 20 MEQ/1
20 TABLET, EXTENDED RELEASE ORAL ONCE
Status: COMPLETED | OUTPATIENT
Start: 2019-05-22 | End: 2019-05-22

## 2019-05-22 RX ORDER — HYDROMORPHONE HYDROCHLORIDE 1 MG/ML
0.4 INJECTION, SOLUTION INTRAMUSCULAR; INTRAVENOUS; SUBCUTANEOUS
Status: DISCONTINUED | OUTPATIENT
Start: 2019-05-22 | End: 2019-05-22 | Stop reason: HOSPADM

## 2019-05-22 RX ORDER — OXYCODONE HCL 5 MG/5 ML
10 SOLUTION, ORAL ORAL
Status: DISCONTINUED | OUTPATIENT
Start: 2019-05-22 | End: 2019-05-22 | Stop reason: HOSPADM

## 2019-05-22 RX ADMIN — METOPROLOL TARTRATE 25 MG: 25 TABLET ORAL at 06:17

## 2019-05-22 RX ADMIN — PROCHLORPERAZINE EDISYLATE 10 MG: 5 INJECTION INTRAMUSCULAR; INTRAVENOUS at 01:13

## 2019-05-22 RX ADMIN — SUCCINYLCHOLINE CHLORIDE 40 MG: 20 INJECTION, SOLUTION INTRAMUSCULAR; INTRAVENOUS at 11:59

## 2019-05-22 RX ADMIN — SUCCINYLCHOLINE CHLORIDE 40 MG: 20 INJECTION, SOLUTION INTRAMUSCULAR; INTRAVENOUS at 11:56

## 2019-05-22 RX ADMIN — NYSTATIN 500000 UNITS: 100000 SUSPENSION ORAL at 14:51

## 2019-05-22 RX ADMIN — HYDROMORPHONE HYDROCHLORIDE 0.4 MG: 1 INJECTION, SOLUTION INTRAMUSCULAR; INTRAVENOUS; SUBCUTANEOUS at 12:57

## 2019-05-22 RX ADMIN — POTASSIUM CHLORIDE 20 MEQ: 1500 TABLET, EXTENDED RELEASE ORAL at 15:39

## 2019-05-22 RX ADMIN — AMLODIPINE BESYLATE 5 MG: 5 TABLET ORAL at 06:17

## 2019-05-22 RX ADMIN — POTASSIUM CHLORIDE 10 MEQ: 7.46 INJECTION, SOLUTION INTRAVENOUS at 10:25

## 2019-05-22 RX ADMIN — POTASSIUM CHLORIDE 20 MEQ: 1500 TABLET, EXTENDED RELEASE ORAL at 06:17

## 2019-05-22 RX ADMIN — HYDRALAZINE HYDROCHLORIDE 5 MG: 10 TABLET, FILM COATED ORAL at 14:51

## 2019-05-22 RX ADMIN — SODIUM CHLORIDE: 9 INJECTION, SOLUTION INTRAVENOUS at 01:12

## 2019-05-22 RX ADMIN — METOPROLOL TARTRATE 25 MG: 25 TABLET ORAL at 18:00

## 2019-05-22 RX ADMIN — NYSTATIN 500000 UNITS: 100000 SUSPENSION ORAL at 19:38

## 2019-05-22 RX ADMIN — NYSTATIN 500000 UNITS: 100000 SUSPENSION ORAL at 08:54

## 2019-05-22 RX ADMIN — PROCHLORPERAZINE EDISYLATE 10 MG: 5 INJECTION INTRAMUSCULAR; INTRAVENOUS at 08:55

## 2019-05-22 RX ADMIN — SODIUM CHLORIDE, POTASSIUM CHLORIDE, SODIUM LACTATE AND CALCIUM CHLORIDE: 600; 310; 30; 20 INJECTION, SOLUTION INTRAVENOUS at 11:49

## 2019-05-22 RX ADMIN — PROPOFOL 100 MG: 10 INJECTION, EMULSION INTRAVENOUS at 11:59

## 2019-05-22 RX ADMIN — HALOPERIDOL LACTATE 3 MG: 5 INJECTION, SOLUTION INTRAMUSCULAR at 08:55

## 2019-05-22 RX ADMIN — PROPOFOL 150 MG: 10 INJECTION, EMULSION INTRAVENOUS at 11:56

## 2019-05-22 RX ADMIN — HALOPERIDOL LACTATE 3 MG: 5 INJECTION, SOLUTION INTRAMUSCULAR at 01:13

## 2019-05-22 RX ADMIN — POTASSIUM CHLORIDE 10 MEQ: 7.46 INJECTION, SOLUTION INTRAVENOUS at 08:55

## 2019-05-22 RX ADMIN — NYSTATIN 500000 UNITS: 100000 SUSPENSION ORAL at 18:02

## 2019-05-22 RX ADMIN — HALOPERIDOL LACTATE 1 MG: 5 INJECTION, SOLUTION INTRAMUSCULAR at 19:38

## 2019-05-22 RX ADMIN — HALOPERIDOL LACTATE 3 MG: 5 INJECTION, SOLUTION INTRAMUSCULAR at 15:34

## 2019-05-22 ASSESSMENT — ENCOUNTER SYMPTOMS
VOMITING: 0
CONSTIPATION: 0
PALPITATIONS: 0
NAUSEA: 1
DIZZINESS: 0
NECK PAIN: 0
ABDOMINAL PAIN: 1
NERVOUS/ANXIOUS: 1
DIARRHEA: 0
WEAKNESS: 1
MYALGIAS: 0
COUGH: 0

## 2019-05-22 ASSESSMENT — PAIN SCALES - GENERAL: PAIN_LEVEL: 2

## 2019-05-22 NOTE — PROGRESS NOTES
"NPO after midnight for EGD, MD Cardona note says \"keep NPO\" but order for after midnight, MD Palmer said OK to keep pt on current diet as long as tolerating. Green liquid ostomy output. Voided x1 in bathroom. Haldol and Compazine frequently.   "

## 2019-05-22 NOTE — RESPIRATORY CARE
COPD EDUCATION by COPD CLINICAL EDUCATOR  5/22/2019  at  10:24 AM by Kala Sawyer     Patient interviewed by COPD education team.  Patient hesitant to discuss health challenges related to her lungs. Thus unable to participate in full program.  We were able to complete a short intervention. A comprehensive packet including information about COPD, treatments, and smoking cessation reviewed and given to her. She states she has a nebulizer machine with medications (Duoneb) at home that she uses occasionally. Reminded her of the importance of keeping her equipment clean. She declined further information and discussion.

## 2019-05-22 NOTE — PROGRESS NOTES
GI ATTENDING:    Patient seen and examined.  Chart reviewed.  EGD with dilation explained at length.    No events over night.  Patient requests we proceed with endoscopy.    Consenting person was given an opportunity to ask questions and discuss other options.  Risks including but not limited to pancreatitis, contrast reaction, stent migration and/or occlusion, perforation, infection, bleeding, missed lesion(s), cardiac and/or pulmonary event, aspiration, stroke, possible need for surgery and/or interventional radiology, hospitalization possibly prolonged, discomfort, unsuccessful and/or incomplete procedure, indefinite diagnosis, ineffective therapy and/or persistent symptoms, possible need for repeat procedures and/or additional testings, damage to adjacent organs and/or vascular structures, medication reaction, disability, death, and other adverse events possibly life-threatening.  Discussion was undertaken with Layman's terms.  Consenting person stated understanding and acceptance of these risks, and wished to proceed.  Informed consent was given in clear state of mind.

## 2019-05-22 NOTE — PROGRESS NOTES
D/w Dr Cardona to leave IV running as she is given K replacement and her K level was 3.3, this was d/w Idania BARNES in preop. Pt does have another IV to Left FA this is working. MD Cardona stated that they can make the decision to stop the K down in OR if needed.     Pt voided in bathroom and dumped ostomy prior to transport.

## 2019-05-22 NOTE — CARE PLAN
Problem: Safety  Goal: Will remain free from injury  Outcome: PROGRESSING AS EXPECTED  Safety measures in place    Problem: Psychosocial Needs:  Goal: Level of anxiety will decrease  Outcome: PROGRESSING AS EXPECTED  Patient is pleasant today but slightly anxious

## 2019-05-22 NOTE — OR NURSING
PT REPORTS BURNING AT RIGHT UPPER ARM. STATES HEAT NOT WORKING. PT REQUESTING  COLD PACK . COLD PACK APPLIED. PT REPORTS THAT SHE HAS HAD INFILTRATION IN THE PAST.

## 2019-05-22 NOTE — OR NURSING
DR HAIR CONSULTED AT PT BEDSIDE.  PT REPORTING  PAIN RIGHT UPPER ARM. BURNING. SITE OF INFILTRATION. ALLERGY TO FENTNAYL ON CHART. PT STATES  THAT DILAUDID WORKS FOR HER.   DR HAIR ORDERS 0.4 MG DILAUDID IV EVERY 5 MINUTES. ENTERED INTO EPIC

## 2019-05-22 NOTE — OR NURSING
PT ARRIVE TO PACU. CONNECTED TO MONITOR.  REPORTS REC'D.   DR HAIR ALERTED PACU NURSE THAT RIGHT UPPER ARM IV HAD INFILTRATED.   NEW IV IN LEFT FOREARM. SECURED AND INFUSING.

## 2019-05-22 NOTE — OR SURGEON
Immediate Post OP Note    PreOp Diagnosis: Esophageal dysphagia    PostOp Diagnosis: Same    Procedure(s):  GASTROSCOPY - WITH DILATION - Wound Class: Clean Contaminated    Surgeon(s):  Dionicio Cardona M.D.    Anesthesiologist/Type of Anesthesia:  Anesthesiologist: King Landin M.D./General    Surgical Staff:  Circulator: Jericho Sánchez R.N.  Endoscopy Technician: Ede Tamez    Specimens removed if any:  * No specimens in log *    Dr. Cardona  [facility]  [phone]    EGD with: Savary dilation of esophagus 54F & 60F    Indication: Esophageal dysphagia    Sedation: GA (RAF Landin)    Findings:   EGD    Esophagus     Increased motility     Unremarkable mucosa     Schatzki ring      At 40cm      Dilated with 54F  & 60F Savary with good mucosal break    Stomach     Hiatal hernia 40-42cm     Unremarkable mucosa    Duodenum     Unremarkable mucosa    Plan:  Soft diet    Resume medications    Follow up with GI in 8-12 weeks     **  GI WILL SIGN OFF / STAND BY - PLEASE CALL IF ANY CONCERNS  **      5/22/2019 12:08 PM Dionicio Cardona M.D.

## 2019-05-22 NOTE — DIETARY
Nutrition Services: Update   RD has been reviewing wired jaw diet to ensure foods are appropriate.  Diet upgraded to low fiber, GI soft post gastroscopy with dilation today 5/22.  RD notified SLP.

## 2019-05-22 NOTE — OR NURSING
PT C/O RIGHT UPPER ARM PAIN DESCRIBED AS BURNING. HEAT PACK APPLIED. SITE IS TENDER TO  LIGHT TOUCH.

## 2019-05-22 NOTE — FACE TO FACE
Face to Face Supporting Documentation - Home Health    The encounter with this patient was in whole or in part the primary reason for home health admission.    Date of encounter:   Patient:                    MRN:                       YOB: 2021  Jana Overton  [id number]  1955     Home health to see patient for:  Skilled Nursing care for assessment, interventions & education, Home health aide and Speech Language Pathology evaluation and treatment    Skilled need for:  Exacerbation of Chronic Disease State esophageal dysphagia    Skilled nursing interventions to include:  Comment: needs help with therapies    Homebound status evidenced by:  Need the aid of supportive devices such as crutches, canes, wheelchairs or walkers. Leaving home requires a considerable and taxing effort. There is a normal inability to leave the home.    Community Physician to provide follow up care: No primary care provider on file.     Optional Interventions? No      I certify the face to face encounter for this home health care referral meets the CMS requirements and the encounter/clinical assessment with the patient was, in whole, or in part, for the medical condition(s) listed above, which is the primary reason for home health care. Based on my clinical findings: the service(s) are medically necessary, support the need for home health care, and the homebound criteria are met.  I certify that this patient has had a face to face encounter by myself.  Félix Palmer M.D. - NPI: [id number]

## 2019-05-22 NOTE — ANESTHESIA PROCEDURE NOTES
Airway  Date/Time: 5/22/2019 12:00 PM  Performed by: NAYA HARDING  Authorized by: NAYA HARDING     Location:  OR  Urgency:  Elective  Indications for Airway Management:  Anesthesia  Spontaneous Ventilation: absent    Sedation Level:  Deep  Preoxygenated: Yes    Patient Position:  Sniffing  Mask Difficulty Assessment:  0 - not attempted  Final Airway Type:  Endotracheal airway  Final Endotracheal Airway:  ETT  Cuffed: Yes    Technique Used for Successful ETT Placement:  Direct laryngoscopy  Insertion Site:  Oral  Blade Type:  Rosa  Laryngoscope Blade/Videolaryngoscope Blade Size:  3  ETT Size (mm):  6.5  Measured from:  Teeth  ETT to Teeth (cm):  22  Placement Verified by: auscultation and capnometry    Cormack-Lehane Classification:  Grade I - full view of glottis  Number of Attempts at Approach:  1

## 2019-05-22 NOTE — ANESTHESIA QCDR
2019 [facility] Clinical Data Registry (for Quality Improvement)     Postoperative nausea/vomiting risk protocol (Adult = 18 yrs and Pediatric 3-17 yrs)- (430 and 463)  General inhalation anesthetic (NOT TIVA) with PONV risk factors: No  Provision of anti-emetic therapy with at least 2 different classes of agents: N/A  Patient DID NOT receive anti-emetic therapy and reason is documented in Medical Record: N/A    Multimodal Pain Management- (AQI59)  Patient undergoing Elective Surgery (i.e. Outpatient, or ASC, or Prescheduled Surgery prior to Hospital Admission): No  Use of Multimodal Pain Management, two or more drugs and/or interventions, NOT including systemic opioids: N/A  Exception: Documented allergy to multiple classes of analgesics: N/A    PACU assessment of acute postoperative pain prior to Anesthesia Care End- Applies to Patients Age = 18- (ABG7)  Initial PACU pain score is which of the following: < 7/10  Patient unable to report pain score: N/A    Post-anesthetic transfer of care checklist/protocol to PACU/ICU- (426 and 427)  Upon conclusion of case, patient transferred to which of the following locations: PACU/Non-ICU  Use of transfer checklist/protocol: Yes  Exclusion: Service Performed in Patient Hospital Room (and thus did not require transfer): N/A    PACU Reintubation- (AQI31)  General anesthesia requiring endotracheal intubation (ETT) along with subsequent extubation in OR or PACU: No  Required reintubation in the PACU: N/A  Extubation was a planned trial documented in the medical record prior to removal of the original airway device: N/A    Unplanned admission to ICU related to anesthesia service up through end of PACU care- (MD51)  Unplanned admission to ICU (not initially anticipated at anesthesia start time): No

## 2019-05-22 NOTE — PROGRESS NOTES
Hospital Medicine Daily Progress Note    Date of Service  5/21/2019    Chief Complaint  63 y.o. female admitted 5/19/2019 with nausea and weakness    Hospital Course    63 y.o. female who past medical history of anxiety, chronic pain, chronic obstructive pulmonary disease not on home oxygen, Crohn's disease, chronic hypokalemia and paroxysmal atrial fibrillation not on anticoagulation presented 5/19/2019 with generalized weakness, palpitations, nausea and vomiting. She was found to have FLORES and started on IVF.       Interval Problem Update  Ab pain-doing somewhat better today, claims to have ostomy output. Per nursing staff, sometimes dumps contents before it can be measured. Tolerating very little oral intake.    Esophageal issues-Consulted GI, h/o esophageal motility issues and requires dilations in the past.     Psych-innumerous, odd affect, extreme variation if mood, etc. Was present on prior admissioin 1/2019, agitation responds well to IV haldol PRN.     Strong narcotic seeking behavior on last admission noted as well.     Consultants/Specialty  GI    Code Status  Full code     Disposition  TBD    Review of Systems  Review of Systems   Constitutional: Negative for fever.   HENT: Negative for hearing loss.    Respiratory: Negative for cough.    Cardiovascular: Negative for chest pain and palpitations.   Gastrointestinal: Positive for constipation (denies output from ostomy), nausea and vomiting. Negative for abdominal pain and diarrhea.        Swallowing issues continue   Genitourinary: Negative for dysuria.   Musculoskeletal: Negative for myalgias and neck pain.   Skin: Negative for rash.   Neurological: Positive for weakness. Negative for dizziness.   Psychiatric/Behavioral: The patient is nervous/anxious.    All other systems reviewed and are negative.       Physical Exam  Temp:  [36.3 °C (97.4 °F)-36.9 °C (98.5 °F)] 36.3 °C (97.4 °F)  Pulse:  [] 95  Resp:  [] 106  BP: (131-168)/(63-96)  149/85  SpO2:  [91 %-97 %] 97 %    Physical Exam   Constitutional: She is oriented to person, place, and time. She appears well-developed and well-nourished.   Calm appearing today   HENT:   Head: Normocephalic and atraumatic.   Eyes: Conjunctivae are normal. Right eye exhibits no discharge. Left eye exhibits no discharge.   Cardiovascular: Normal rate and regular rhythm.    No murmur heard.  Pulmonary/Chest: Effort normal. No stridor. No respiratory distress.   Abdominal: Soft. Bowel sounds are normal. She exhibits no distension. There is tenderness.   LLQ ostomy +, some air and stool present, opaque bag so contents could not be seen   Musculoskeletal: She exhibits no edema or tenderness.   Neurological: She is alert and oriented to person, place, and time.   Skin: Skin is warm and dry.   Psychiatric:   Odd affect, highly labile mood   Nursing note and vitals reviewed.      Fluids    Intake/Output Summary (Last 24 hours) at 05/21/19 1731  Last data filed at 05/21/19 1229   Gross per 24 hour   Intake                0 ml   Output              925 ml   Net             -925 ml       Laboratory  Recent Labs      05/19/19   1720  05/20/19   0327  05/21/19   0757   WBC  13.8*  11.2*  10.0   RBC  5.26  4.71  4.85   HEMOGLOBIN  15.5  13.9  13.7   HEMATOCRIT  46.0  42.7  42.8   MCV  87.5  90.7  88.2   MCH  29.5  29.5  28.2   MCHC  33.7  32.6*  32.0*   RDW  43.7  45.6  44.9   PLATELETCT  294  250  236   MPV  10.1  10.9  10.0     Recent Labs      05/20/19   0327  05/20/19   1926  05/21/19   0653   SODIUM  142  145  144   POTASSIUM  3.3*  3.5*  3.3*   CHLORIDE  108  106  108   CO2  23  14*  22   GLUCOSE  96  104*  98   BUN  17  15  16   CREATININE  0.78  0.85  0.85   CALCIUM  9.9  10.1  9.5                   Imaging  DX-ABDOMEN COMPLETE WITH AP OR PA CXR   Final Result      No evidence of bowel obstruction.           Assessment/Plan  Tachycardia- (present on admission)   Assessment & Plan    Likely secondary to  dehydration.  Telemetry monitor shows sinus tachycardia, but overall improved, stop tele montoring  -TSH normal  -continue IVF's     Dehydration- (present on admission)   Assessment & Plan    Secondary to nausea and vomiting and reduced oral intake.     Paroxysmal atrial fibrillation (HCC)- (present on admission)   Assessment & Plan    Metoprolol for rate control.  Currently sinus tachycardia, paroxysmally, no e/o AF/flutter  Continuous cardiac monitoring  Is not on anticoagulation currently.     Lactic acidosis- (present on admission)   Assessment & Plan    Likely secondary to hypovolemic secondary to dehydration   resovled with IVF      SIRS (systemic inflammatory response syndrome) (HCC)- (present on admission)   Assessment & Plan    Likely secondary to dehydration   Does not appear to have focal signs of infection  I will hold on starting ABX at this point, and follow clinically cultures ordered.    -no change today     Anemia- (present on admission)   Assessment & Plan    -H/H remains stable at this time, no need for transfusion, daily CBC     Anxiety disorder due to multiple medical problems- (present on admission)   Assessment & Plan    Has been given diazepam in the emergency department.  Counseling, try to avoid benzos as possible.  May benefit from a psychiatry consult, was consulted on last admission  -monitor closely     Crohn's disease of small intestine with complication (HCC)- (present on admission)   Assessment & Plan    Reports that her abdominal pain is at baseline  ESR normal. AB xray unremarkable, no true clinical evidence of IBD flare at this time  Supportive care with pain control intravenous fluids.  Had ostomy placed in January by Dr. Cruz. Follows with Dr. Mahan for GI     Esophageal dysphagia   Assessment & Plan    -patient has h/o this, prior dilation in 1/2019, bx's at that time negative for EOE or other dysplastic changes  -Consulted GI  -going for repeat EGD tomorrow with likely  dilation, ?botox injections  -modified diet this PM     Hypertension- (present on admission)   Assessment & Plan    Only on lopressor at home  -BP better today  -continue added norvasc, but no dose escalation today  Labetalol and hydralazine PRN      ARF (acute renal failure) ([facility])- (present on admission)   Assessment & Plan    Appears pre renal etiology  Cont IVF  -Has returned to her baseline     COPD (chronic obstructive pulmonary disease) ([facility])- (present on admission)   Assessment & Plan    Not currently in exacerbation  Oxygen as needed, Respiratory protocol, Incentive spirometry      Chronic pain- (present on admission)   Assessment & Plan    Pain control.  Monitor respiratory status closely  -h/o narcotic seeking behavior  -NO ESCALATION IN PAIN MEDICATIONS          VTE prophylaxis: heparin

## 2019-05-22 NOTE — ASSESSMENT & PLAN NOTE
-patient has h/o this, prior dilation in 1/2019, bx's at that time negative for EOE or other dysplastic changes  -Consulted GI, s/p esophageal dilation today  -advance diet per SLP

## 2019-05-22 NOTE — PROCEDURES
DATE OF SERVICE:  05/22/2019    PROCEDURES:  Esophagogastroduodenoscopy with Savary dilation of the esophagus.    INDICATION:  Esophageal dysphagia, responsive previously to 51-Telugu Savary   dilation.    FINAL IMPRESSION:  1.  Esophagus increased motility, otherwise unremarkable mucosa.  2.  Schatzki's ring at 40 cm, dilated up to 60-Telugu, with Savary dilators   with good mucosal break.  3.  A 2 cm hiatal hernia.  4.  Unremarkable gastric mucosa.  5.  Unremarkable duodenal mucosa.    RECOMMENDATIONS:  1.  Soft diet.  2.  Resume medications.  3.  Follow up with GI in 8-12 weeks.    GI will sign off/standby, please call if any concerns arise.    DESCRIPTION OF PROCEDURE:  Prior to the procedure, physical exam was stable.    During procedure, vital signs remained within normal limits.  Prior to   sedation, informed consent was obtained.  Risks, benefits, alternatives   including but not limited to risk of bleeding, infection, perforation, adverse   reaction to medication, failure to identify pathology and death explained to   the patient who accepted all risks.  Patient prepped in the supine position   with a roll under her right shoulder after she was intubated and sedated by   anesthesia.  Scope tip of the Olympus flexible gastroscope passed in the   proximal esophagus.  Proximal middle and distal thirds of the esophagus were   well visualized.  There was increased motility throughout the esophagus, but   the mucosa itself was unremarkable.  The Z line was regular at 40 cm and there   was a subtle Schatzki's ring associated with this, which was widely patent.    Stomach was entered.  There was a hiatal hernia from 40-42 cm.  Gastric pool   was suctioned.  Air was insufflated, scope retroflexed to view the body,   fundus, and cardia, then straightened to view the antrum and incisura.  The   hiatal hernia was appreciated and the scope was withdrawn back into the hiatal   hernia on retroflex view to view the cardia  aspect to the GE junction, which   was unremarkable.  Scope straightened to view the antrum and incisura.  The   pylorus was patent.  Duodenal bulb sweep second and third portion were   unremarkable.  Scope was withdrawn back into the antrum and a Savary guidewire   was passed into the antrum and the scope removed off of this.  A 54-Maltese   Savary dilator was chosen, lubricated and passed over the wire and down the   esophagus with no resistance whatsoever.  A 57-Maltese Savary dilator was then   requested but this was not available, so decision was made to pass a 60-Maltese   Savary dilator.  This was done and passed down the esophagus with no   resistance.  On removal, there was no blood on the dilator.  The wire and   dilator were removed, and the gastroscope was re-lubricated and passed down   the esophagus.  The proximal, middle and distal thirds of the esophagus were   well visualized.  There was a small mucosal break at the GE junction at the   level of the Schatzki's ring, but this was not deep and not suspicious for any   concerning features.  Stomach was decompressed.  Air and liquid were   suctioned.  The procedure was deemed complete.  The scope was withdrawn.    Patient tolerated the procedure well and was sent to recovery without   immediate complications.       ____________________________________     MD TOO POP / NTS    DD:  05/22/2019 12:15:43  DT:  05/22/2019 12:24:50    D#:  [id number]  Job#:  [id number]

## 2019-05-22 NOTE — ANESTHESIA PREPROCEDURE EVALUATION
Relevant Problems   (+) ARF (acute renal failure) ([facility])   (+) COPD (chronic obstructive pulmonary disease) ([facility])   (+) Dehydration   (+) GERD (gastroesophageal reflux disease)   (+) Hypertension   (+) Paroxysmal atrial fibrillation ([facility])   (+) Sleep apnea       Physical Exam    Airway   Mallampati: II  TM distance: >3 FB  Neck ROM: full       Cardiovascular - normal exam  Rhythm: regular  Rate: normal  (-) murmur     Dental - normal exam         Pulmonary - normal exam  Breath sounds clear to auscultation     Abdominal    Neurological - normal exam                 Anesthesia Plan    ASA 3   ASA physical status 3 criteria: COPD - poorly controlled    Plan - general       Airway plan will be ETT        Induction: intravenous    Postoperative Plan: Postoperative administration of opioids is intended.    Pertinent diagnostic labs and testing reviewed    Informed Consent:    Anesthetic plan and risks discussed with patient.    Use of blood products discussed with: patient whom consented to blood products.

## 2019-05-22 NOTE — THERAPY
"Speech Language Therapy dysphagia treatment completed.   Functional Status:  Called by RD regarding appropriate texture following GI procedure. Per GI notes and the nurs, pt with Schatzki ring that was dilated with GI recommending low fiber soft texture. Pt required min encouragement to trial soft fruit. Pt with mild increase in time to masticate two diced pieces of peaches and one diced piece of pear.Pt complaining of oral pain related to \"mouth sores.\" No oral lesions seen when pt asked to open her oral cavity. Oral mucosa moist. Pt is on Nystatin. Pt with frequent belching. Pt given one cup of thin apple juice and one cup of pudding with no complaints of oral or throat pain. Pt currently declining soft texture. Her preference is for continued jaw wired/puree texture and thins. She is willing to try the soft texture again on Thurs. Pt with intermittent unusual speech and tongue movements during tx. Nurs is aware of varying behaviors.   Recommendations: Puree/jaw wired with thin liquids as tolerated.   Plan of Care: Will benefit from Speech Therapy 3 times per week  Post-Acute Therapy: not indicated at d/c. ThanksValdo    See \"Rehab Therapy-Acute\" Patient Summary Report for complete documentation.     "

## 2019-05-22 NOTE — ANESTHESIA TIME REPORT
Anesthesia Start and Stop Event Times     Date Time Event    5/22/2019 1149 Anesthesia Start     1213 Anesthesia Stop        Responsible Staff  05/22/19    Name Role Begin End    King Landin M.D. Anesth 1149 1213        Preop Diagnosis (Free Text):  Pre-op Diagnosis     Dysphagia        Preop Diagnosis (Codes):  Diagnosis Information     Diagnosis Code(s):         Post op Diagnosis  Esophageal stricture      Premium Reason  Non-Premium    Comments:

## 2019-05-23 ENCOUNTER — PATIENT OUTREACH (OUTPATIENT)
Dept: HEALTH INFORMATION MANAGEMENT | Facility: OTHER | Age: 64
End: 2019-05-23

## 2019-05-23 PROBLEM — R65.10 SIRS (SYSTEMIC INFLAMMATORY RESPONSE SYNDROME) (HCC): Status: RESOLVED | Noted: 2019-05-19 | Resolved: 2019-05-23

## 2019-05-23 PROBLEM — E87.20 LACTIC ACIDOSIS: Status: RESOLVED | Noted: 2019-05-19 | Resolved: 2019-05-23

## 2019-05-23 PROBLEM — R13.19 ESOPHAGEAL DYSPHAGIA: Status: RESOLVED | Noted: 2019-05-21 | Resolved: 2019-05-23

## 2019-05-23 PROBLEM — N17.9 ARF (ACUTE RENAL FAILURE) (HCC): Status: RESOLVED | Noted: 2018-12-12 | Resolved: 2019-05-23

## 2019-05-23 PROBLEM — R00.0 TACHYCARDIA: Status: RESOLVED | Noted: 2019-05-19 | Resolved: 2019-05-23

## 2019-05-23 PROBLEM — E86.0 DEHYDRATION: Status: RESOLVED | Noted: 2018-12-12 | Resolved: 2019-05-23

## 2019-05-23 LAB
ANION GAP SERPL CALC-SCNC: 9 MMOL/L (ref 0–11.9)
BUN SERPL-MCNC: 13 MG/DL (ref 8–22)
CALCIUM SERPL-MCNC: 10.3 MG/DL (ref 8.5–10.5)
CHLORIDE SERPL-SCNC: 105 MMOL/L (ref 96–112)
CO2 SERPL-SCNC: 25 MMOL/L (ref 20–33)
CREAT SERPL-MCNC: 0.76 MG/DL (ref 0.5–1.4)
EKG IMPRESSION: NORMAL
GLUCOSE SERPL-MCNC: 114 MG/DL (ref 65–99)
MAGNESIUM SERPL-MCNC: 1.8 MG/DL (ref 1.5–2.5)
POTASSIUM SERPL-SCNC: 3.6 MMOL/L (ref 3.6–5.5)
SODIUM SERPL-SCNC: 139 MMOL/L (ref 135–145)

## 2019-05-23 PROCEDURE — A9270 NON-COVERED ITEM OR SERVICE: HCPCS | Performed by: HOSPITALIST

## 2019-05-23 PROCEDURE — 770001 HCHG ROOM/CARE - MED/SURG/GYN PRIV*

## 2019-05-23 PROCEDURE — A9270 NON-COVERED ITEM OR SERVICE: HCPCS | Performed by: INTERNAL MEDICINE

## 2019-05-23 PROCEDURE — 700102 HCHG RX REV CODE 250 W/ 637 OVERRIDE(OP): Performed by: HOSPITALIST

## 2019-05-23 PROCEDURE — 700111 HCHG RX REV CODE 636 W/ 250 OVERRIDE (IP): Performed by: INTERNAL MEDICINE

## 2019-05-23 PROCEDURE — 700102 HCHG RX REV CODE 250 W/ 637 OVERRIDE(OP): Performed by: INTERNAL MEDICINE

## 2019-05-23 PROCEDURE — 36415 COLL VENOUS BLD VENIPUNCTURE: CPT

## 2019-05-23 PROCEDURE — 83735 ASSAY OF MAGNESIUM: CPT

## 2019-05-23 PROCEDURE — 99232 SBSQ HOSP IP/OBS MODERATE 35: CPT | Performed by: INTERNAL MEDICINE

## 2019-05-23 PROCEDURE — 80048 BASIC METABOLIC PNL TOTAL CA: CPT

## 2019-05-23 RX ORDER — OXYCODONE HCL 20 MG/ML
30 CONCENTRATE, ORAL ORAL EVERY 4 HOURS PRN
Status: DISCONTINUED | OUTPATIENT
Start: 2019-05-23 | End: 2019-05-24 | Stop reason: HOSPADM

## 2019-05-23 RX ORDER — OXYCODONE HCL 20 MG/ML
15 CONCENTRATE, ORAL ORAL EVERY 4 HOURS PRN
Status: DISCONTINUED | OUTPATIENT
Start: 2019-05-23 | End: 2019-05-24 | Stop reason: HOSPADM

## 2019-05-23 RX ORDER — AMLODIPINE BESYLATE 5 MG/1
5 TABLET ORAL DAILY
Qty: 30 TAB | Refills: 1 | Status: SHIPPED | OUTPATIENT
Start: 2019-05-24 | End: 2019-05-30

## 2019-05-23 RX ADMIN — NYSTATIN 500000 UNITS: 100000 SUSPENSION ORAL at 17:10

## 2019-05-23 RX ADMIN — NYSTATIN 500000 UNITS: 100000 SUSPENSION ORAL at 10:53

## 2019-05-23 RX ADMIN — NYSTATIN 500000 UNITS: 100000 SUSPENSION ORAL at 20:52

## 2019-05-23 RX ADMIN — METOPROLOL TARTRATE 25 MG: 25 TABLET ORAL at 04:17

## 2019-05-23 RX ADMIN — OXYCODONE HYDROCHLORIDE 30 MG: 100 SOLUTION ORAL at 17:04

## 2019-05-23 RX ADMIN — AMLODIPINE BESYLATE 5 MG: 5 TABLET ORAL at 04:16

## 2019-05-23 RX ADMIN — HALOPERIDOL LACTATE 1 MG: 5 INJECTION, SOLUTION INTRAMUSCULAR at 04:17

## 2019-05-23 RX ADMIN — PROCHLORPERAZINE EDISYLATE 10 MG: 5 INJECTION INTRAMUSCULAR; INTRAVENOUS at 20:52

## 2019-05-23 RX ADMIN — METOPROLOL TARTRATE 25 MG: 25 TABLET ORAL at 17:10

## 2019-05-23 RX ADMIN — HALOPERIDOL LACTATE 1 MG: 5 INJECTION, SOLUTION INTRAMUSCULAR at 20:52

## 2019-05-23 ASSESSMENT — ENCOUNTER SYMPTOMS
ABDOMINAL PAIN: 1
NERVOUS/ANXIOUS: 1
VOMITING: 0
MYALGIAS: 0
CHILLS: 0
CONSTIPATION: 0
FEVER: 0
SHORTNESS OF BREATH: 0
DIARRHEA: 0
NAUSEA: 0
DEPRESSION: 0
DIZZINESS: 0
WEAKNESS: 1

## 2019-05-23 NOTE — PROGRESS NOTES
Medications (Valium and Oxycodone) placed in secure pharmacy bag, pharmacy contacted to  medications.     Awaiting approval from pharmacy manager whether or not we can use her Oxycodone Liquid Solution for pain mgmt while inpatient.

## 2019-05-23 NOTE — PROGRESS NOTES
Hospital Medicine Daily Progress Note    Date of Service  5/23/2019    Chief Complaint  63 y.o. female admitted 5/19/2019 with nausea and weakness    Hospital Course    63 y.o. female who past medical history of anxiety, chronic pain, chronic obstructive pulmonary disease not on home oxygen, Crohn's disease, chronic hypokalemia and paroxysmal atrial fibrillation not on anticoagulation presented 5/19/2019 with generalized weakness, palpitations, nausea and vomiting. She was found to have FLORES and started on IVF.       Interval Problem Update  Ab pain-pain is well controlled, good ostomy output, tolerating oral intake after esophageal procedure.    Esophageal issues-s/p esophageal dilation POD#1, doing well.     Psych-mood slightly better. Still asking for haldol.     Strong narcotic seeking behavior-continues, no further escalation in pain medications at this time.     Consultants/Specialty  GI    Code Status  Full code     Disposition  TBD    Review of Systems  Review of Systems   Constitutional: Negative for chills and fever.        Essentially at her baseline   HENT: Negative for tinnitus.    Respiratory: Negative for shortness of breath.    Cardiovascular: Negative for chest pain.   Gastrointestinal: Positive for abdominal pain (mild). Negative for constipation (denies output from ostomy), diarrhea, nausea and vomiting.        Swallowing ok today   Genitourinary: Negative for frequency and urgency.   Musculoskeletal: Negative for myalgias.   Skin: Negative for rash.   Neurological: Positive for weakness. Negative for dizziness.   Psychiatric/Behavioral: Negative for depression. The patient is nervous/anxious.         Appears somewhat calmer today   All other systems reviewed and are negative.       Physical Exam  Temp:  [36.4 °C (97.6 °F)-37.6 °C (99.6 °F)] 36.7 °C (98.1 °F)  Pulse:  [] 95  Resp:  [12-18] 16  BP: (113-156)/(69-79) 113/70  SpO2:  [91 %-96 %] 95 %    Physical Exam   Constitutional: She is  oriented to person, place, and time. She appears well-developed and well-nourished.   Sitting in a very dark room, less sad appearing today   HENT:   Head: Normocephalic and atraumatic.   Eyes: Conjunctivae are normal. Right eye exhibits no discharge. Left eye exhibits no discharge.   Cardiovascular: Normal rate and regular rhythm.    Pulmonary/Chest: Effort normal. No stridor. No respiratory distress.   Abdominal: Soft. Bowel sounds are normal. She exhibits no distension. There is tenderness (mild, unchanged again).   LLQ ostomy +, some air and stool present, opaque bag so contents could not be seen, no clear clinical change noted today   Musculoskeletal: She exhibits no tenderness.   Neurological: She is alert and oriented to person, place, and time.   Skin: Skin is warm and dry.   Psychiatric:   Odd affect, less labile today   Nursing note and vitals reviewed.      Fluids    Intake/Output Summary (Last 24 hours) at 05/23/19 1551  Last data filed at 05/23/19 1545   Gross per 24 hour   Intake              240 ml   Output              930 ml   Net             -690 ml       Laboratory  Recent Labs      05/21/19   0757  05/22/19   0210   WBC  10.0  7.2   RBC  4.85  4.72   HEMOGLOBIN  13.7  13.6   HEMATOCRIT  42.8  42.2   MCV  88.2  89.4   MCH  28.2  28.8   MCHC  32.0*  32.2*   RDW  44.9  45.1   PLATELETCT  236  220   MPV  10.0  10.2     Recent Labs      05/21/19   0653  05/22/19   0210  05/23/19   0045   SODIUM  144  142  139   POTASSIUM  3.3*  3.3*  3.6   CHLORIDE  108  109  105   CO2  22  24  25   GLUCOSE  98  115*  114*   BUN  16  13  13   CREATININE  0.85  0.76  0.76   CALCIUM  9.5  10.0  10.3                   Imaging  DX-ABDOMEN COMPLETE WITH AP OR PA CXR   Final Result      No evidence of bowel obstruction.           Assessment/Plan  Tachycardia- (present on admission)   Assessment & Plan    Likely secondary to dehydration.  Telemetry monitor shows sinus tachycardia, but overall improved, stop tele  montoring  -TSH normal  -continue IVF's     Dehydration- (present on admission)   Assessment & Plan    Secondary to nausea and vomiting and reduced oral intake.     Paroxysmal atrial fibrillation (HCC)- (present on admission)   Assessment & Plan    Metoprolol for rate control.  Currently sinus tachycardia, paroxysmally, no e/o AF/flutter  Continuous cardiac monitoring  Is not on anticoagulation currently.     SIRS (systemic inflammatory response syndrome) (HCC)- (present on admission)   Assessment & Plan    Likely secondary to dehydration   Does not appear to have focal signs of infection  I will hold on starting ABX at this point, and follow clinically cultures ordered.    -no change today     Anemia- (present on admission)   Assessment & Plan    -H/H remains stable at this time, no need for transfusion, daily CBC     Anxiety disorder due to multiple medical problems- (present on admission)   Assessment & Plan    Has been given diazepam in the emergency department.  Counseling, try to avoid benzos as possible.  May benefit from a psychiatry consult, was consulted on last admission  -monitor closely  -actively requesting haldol, will reduce dose to 1 mg Q4 hours PRN, tolerating this dosing well  -check EKG to determine QTc, which is ok     Crohn's disease of small intestine with complication (HCC)- (present on admission)   Assessment & Plan    Reports that her abdominal pain is at baseline, no clear clinical changes today again  ESR normal. AB xray unremarkable, no true clinical evidence of IBD flare at this time  Supportive care with pain control, good ostomy output, tolerating oral diet  -she demands Dr Cruz be consulted, I paged him, awaiting return call  Had ostomy placed in January by Dr. Cruz. Follows with Dr. Mahan for GI     Esophageal dysphagia   Assessment & Plan    -patient has h/o this, prior dilation in 1/2019, bx's at that time negative for EOE or other dysplastic changes  -Consulted GI, s/p  esophageal dilation today  -advance diet per SLP     Hypertension- (present on admission)   Assessment & Plan    Only on lopressor at home  -BP remains well controlled  -continue added norvasc, but no dose escalation today  Labetalol and hydralazine PRN      ARF (acute renal failure) ([facility])- (present on admission)   Assessment & Plan    Appears pre renal etiology  Cont IVF  -Has returned to her baseline     COPD (chronic obstructive pulmonary disease) ([facility])- (present on admission)   Assessment & Plan    Not currently in exacerbation  Oxygen as needed, Respiratory protocol, Incentive spirometry      Chronic pain- (present on admission)   Assessment & Plan    -appears decently contorlled  Monitor respiratory status closely  -h/o narcotic seeking behavior  -NO ESCALATION IN PAIN MEDICATIONS, spoke with Rx and will try and see if we can get her outpatient oral oxycodone solution approved with Rx manager          VTE prophylaxis: heparin

## 2019-05-23 NOTE — DISCHARGE PLANNING
Agency/Facility Name: [facility]   Spoke To: Fax transmission  Outcome: Patient declined due to behavior.

## 2019-05-23 NOTE — PROGRESS NOTES
12 hour chart check.   Pt c/o body aches x 4 days, worse to the left side of her body (e.g. wrist, forearm, ankle).

## 2019-05-23 NOTE — CARE PLAN
Problem: Safety  Goal: Will remain free from falls  Outcome: PROGRESSING AS EXPECTED  Patient resting in bed with call light in reach and bed alarm on.      Problem: Pain Management  Goal: Pain level will decrease to patient's comfort goal  Outcome: PROGRESSING AS EXPECTED  Patient educated on non-medication pain alternatives.

## 2019-05-23 NOTE — DISCHARGE PLANNING
Anticipated Discharge Disposition: Home with Outpatient [facility]    Action: LSW spoke with pt at bedside. Pt stated that if she does not get a surgery consult for Dr. Sepulveda, she is going to appeal her discharge. LSW spoke with doctor who stated he does not feel it is necessary for consult. LSW spoke with pt and pt appealed her discharge (#[id number]). LSW informed CCA, doctor and bedside RN.     LSW provided pt with the Detailed Notice of Discharge.     Barriers to Discharge: Appeal    Plan: Awaiting appeal process.

## 2019-05-23 NOTE — DISCHARGE PLANNING
Anticipated Discharge Disposition: Home with HH    Action: LSW spoke with pt at bedside to discuss discharge planning. Pt made choice for [facility]. LSW faxed choice form to [facility].     LSW discussed Medicare rights with pt and pt does not wish to appeal at this time.     Barriers to Discharge: None    Plan: Awaiting HH acceptance.

## 2019-05-23 NOTE — PROGRESS NOTES
Received report from Alex, at pt bedside. Pt C/O pain in abdomen at baseline for pt, pt has orders for D/C pt willing to go home with HH, will discuss with SW on choice and acceptance prior to D/C, POC discussed. Call light and belongings within reach. Bed locked and in low position. Alarm on and fall precautions in place.

## 2019-05-23 NOTE — CARE PLAN
Problem: Knowledge Deficit  Goal: Knowledge of disease process/condition, treatment plan, diagnostic tests, and medications will improve  Outcome: PROGRESSING AS EXPECTED  Pt educated on barrier for D/C pending acceptance for home health, SW received choice.     Problem: Discharge Barriers/Planning  Goal: Patient's continuum of care needs will be met  Outcome: PROGRESSING AS EXPECTED  Pt educated on waiting acceptance for .

## 2019-05-23 NOTE — DISCHARGE PLANNING
Received Choice form at 0925  Agency/Facility Name: [facility]  Referral sent per Choice form at 0953

## 2019-05-23 NOTE — PROGRESS NOTES
Hospital Medicine Daily Progress Note    Date of Service  5/22/2019    Chief Complaint  63 y.o. female admitted 5/19/2019 with nausea and weakness    Hospital Course    63 y.o. female who past medical history of anxiety, chronic pain, chronic obstructive pulmonary disease not on home oxygen, Crohn's disease, chronic hypokalemia and paroxysmal atrial fibrillation not on anticoagulation presented 5/19/2019 with generalized weakness, palpitations, nausea and vomiting. She was found to have FLORES and started on IVF.       Interval Problem Update  Ab pain-pain is somewhat worse today, but patient is somewhat all over the map, because at the end of our conversation her ostomy is working great according to her. She wants IV dilaudid.     Esophageal issues-s/p esophageal dilation today. Successful.     Psych-innumerous, odd affect, labile mood continues, actively asks for haldol.     Strong narcotic seeking behavior on last admission noted as well. This continues on this admission. She will only take IV dilaudid or special kind of oral oxycodone liquid solution that does not contain certain contaminants that affect her mouth sores.     Consultants/Specialty  GI    Code Status  Full code     Disposition  TBD    Review of Systems  Review of Systems   Constitutional:        No clear clinical improvement at this time   HENT: Negative for tinnitus.    Respiratory: Negative for cough.    Cardiovascular: Negative for palpitations.   Gastrointestinal: Positive for abdominal pain and nausea. Negative for constipation (denies output from ostomy), diarrhea and vomiting.   Genitourinary: Negative for dysuria.   Musculoskeletal: Negative for myalgias and neck pain.   Skin: Negative for itching.   Neurological: Positive for weakness. Negative for dizziness.   Psychiatric/Behavioral: The patient is nervous/anxious.    All other systems reviewed and are negative.       Physical Exam  Temp:  [35.9 °C (96.7 °F)-37.6 °C (99.6 °F)] 37.6 °C (99.6  °F)  Pulse:  [] 102  Resp:  [12-21] 12  BP: (136-182)/(71-90) 136/71  SpO2:  [91 %-100 %] 95 %    Physical Exam   Constitutional: She is oriented to person, place, and time. She appears well-developed and well-nourished.   Sitting in a very dark room, appears sad   HENT:   Head: Normocephalic and atraumatic.   Eyes: Conjunctivae are normal. No scleral icterus.   Cardiovascular: Normal rate and regular rhythm.    No murmur heard.  Pulmonary/Chest: Effort normal. No stridor. She has no wheezes.   Abdominal: Soft. Bowel sounds are normal. She exhibits no distension. There is tenderness (mild).   LLQ ostomy +, some air and stool present, opaque bag so contents could not be seen, unchanged today   Musculoskeletal: She exhibits no edema or tenderness.   Neurological: She is alert and oriented to person, place, and time.   Skin: Skin is warm and dry.   Psychiatric:   Odd affect, highly labile mood   Nursing note and vitals reviewed.      Fluids    Intake/Output Summary (Last 24 hours) at 05/22/19 1718  Last data filed at 05/22/19 1200   Gross per 24 hour   Intake                0 ml   Output              150 ml   Net             -150 ml       Laboratory  Recent Labs      05/20/19   0327  05/21/19   0757  05/22/19   0210   WBC  11.2*  10.0  7.2   RBC  4.71  4.85  4.72   HEMOGLOBIN  13.9  13.7  13.6   HEMATOCRIT  42.7  42.8  42.2   MCV  90.7  88.2  89.4   MCH  29.5  28.2  28.8   MCHC  32.6*  32.0*  32.2*   RDW  45.6  44.9  45.1   PLATELETCT  250  236  220   MPV  10.9  10.0  10.2     Recent Labs      05/20/19   1926  05/21/19   0653  05/22/19   0210   SODIUM  145  144  142   POTASSIUM  3.5*  3.3*  3.3*   CHLORIDE  106  108  109   CO2  14*  22  24   GLUCOSE  104*  98  115*   BUN  15  16  13   CREATININE  0.85  0.85  0.76   CALCIUM  10.1  9.5  10.0                   Imaging  DX-ABDOMEN COMPLETE WITH AP OR PA CXR   Final Result      No evidence of bowel obstruction.           Assessment/Plan  Tachycardia- (present on  admission)   Assessment & Plan    Likely secondary to dehydration.  Telemetry monitor shows sinus tachycardia, but overall improved, stop tele montoring  -TSH normal  -continue IVF's     Dehydration- (present on admission)   Assessment & Plan    Secondary to nausea and vomiting and reduced oral intake.     Paroxysmal atrial fibrillation (HCC)- (present on admission)   Assessment & Plan    Metoprolol for rate control.  Currently sinus tachycardia, paroxysmally, no e/o AF/flutter  Continuous cardiac monitoring  Is not on anticoagulation currently.     Lactic acidosis- (present on admission)   Assessment & Plan    Likely secondary to hypovolemic secondary to dehydration   resovled with IVF      SIRS (systemic inflammatory response syndrome) (HCC)- (present on admission)   Assessment & Plan    Likely secondary to dehydration   Does not appear to have focal signs of infection  I will hold on starting ABX at this point, and follow clinically cultures ordered.    -no change today     Anemia- (present on admission)   Assessment & Plan    -H/H remains stable at this time, no need for transfusion, daily CBC     Anxiety disorder due to multiple medical problems- (present on admission)   Assessment & Plan    Has been given diazepam in the emergency department.  Counseling, try to avoid benzos as possible.  May benefit from a psychiatry consult, was consulted on last admission  -monitor closely  -actively requesting haldol, will reduce dose to 2 mg PRN  -check EKG to determine QTc     Crohn's disease of small intestine with complication (HCC)- (present on admission)   Assessment & Plan    Reports that her abdominal pain is at baseline, but somewhat vacillates in her description as well  ESR normal. AB xray unremarkable, no true clinical evidence of IBD flare at this time  Supportive care with pain control intravenous fluids.  Had ostomy placed in January by Dr. Cruz. Follows with Dr. Mahan for GI     Esophageal dysphagia    Assessment & Plan    -patient has h/o this, prior dilation in 1/2019, bx's at that time negative for EOE or other dysplastic changes  -Consulted GI, s/p esophageal dilation today  -advance diet per SLP     Hypertension- (present on admission)   Assessment & Plan    Only on lopressor at home  -BP better today  -continue added norvasc, but no dose escalation today  Labetalol and hydralazine PRN      ARF (acute renal failure) ([facility])- (present on admission)   Assessment & Plan    Appears pre renal etiology  Cont IVF  -Has returned to her baseline     COPD (chronic obstructive pulmonary disease) ([facility])- (present on admission)   Assessment & Plan    Not currently in exacerbation  Oxygen as needed, Respiratory protocol, Incentive spirometry      Chronic pain- (present on admission)   Assessment & Plan    Pain control.  Monitor respiratory status closely  -h/o narcotic seeking behavior  -NO ESCALATION IN PAIN MEDICATIONS, spoke with Rx and will try and see if we can get her outpatient oral oxycodone solution approved with Rx manager          VTE prophylaxis: heparin

## 2019-05-23 NOTE — DISCHARGE INSTRUCTIONS
Discharge Instructions    Discharged to home by car with relative. Discharged via wheelchair, hospital escort: Yes.  Special equipment needed: Wheelchair    Be sure to schedule a follow-up appointment with your primary care doctor or any specialists as instructed.     Discharge Plan:   Diet Plan: Discussed  Activity Level: Discussed  Confirmed Follow up Appointment: Appointment Scheduled  Confirmed Symptoms Management: Discussed  Medication Reconciliation Updated: Yes  Influenza Vaccine Indication: Not indicated: Previously immunized this influenza season and > 8 years of age    I understand that a diet low in cholesterol, fat, and sodium is recommended for good health. Unless I have been given specific instructions below for another diet, I accept this instruction as my diet prescription.   Other diet: dysphagia 1 thin liquid diet    Special Instructions: None    · Is patient discharged on Warfarin / Coumadin?   No     Depression / Suicide Risk    As you are discharged from this [facility] facility, it is important to learn how to keep safe from harming yourself.    Recognize the warning signs:  · Abrupt changes in personality, positive or negative- including increase in energy   · Giving away possessions  · Change in eating patterns- significant weight changes-  positive or negative  · Change in sleeping patterns- unable to sleep or sleeping all the time   · Unwillingness or inability to communicate  · Depression  · Unusual sadness, discouragement and loneliness  · Talk of wanting to die  · Neglect of personal appearance   · Rebelliousness- reckless behavior  · Withdrawal from people/activities they love  · Confusion- inability to concentrate     If you or a loved one observes any of these behaviors or has concerns about self-harm, here's what you can do:  · Talk about it- your feelings and reasons for harming yourself  · Remove any means that you might use to hurt yourself (examples: pills, rope, extension  cords, firearm)  · Get professional help from the community (Mental Health, Substance Abuse, psychological counseling)  · Do not be alone:Call your Safe Contact- someone whom you trust who will be there for you.  · Call your local CRISIS HOTLINE 472-4014 or 911-422-6962  · Call your local Children's Mobile Crisis Response Team [place] (920) 218-0424 or www.DailyTicket  · Call the toll free National Suicide Prevention Hotlines   · National Suicide Prevention Lifeline 791-623-JHTN (5526)  · [employer] Hope Line Network 800-SUICIDE (153-9669)    Dehydration, Adult  Dehydration is when there is not enough fluid or water in your body. This happens when you lose more fluids than you take in. Dehydration can range from mild to very bad. It should be treated right away to keep it from getting very bad.  Symptoms of mild dehydration may include:  · Thirst.  · Dry lips.  · Slightly dry mouth.  · Dry, warm skin.  · Dizziness.  Symptoms of moderate dehydration may include:  · Very dry mouth.  · Muscle cramps.  · Dark pee (urine). Pee may be the color of tea.  · Your body making less pee.  · Your eyes making fewer tears.  · Heartbeat that is uneven or faster than normal (palpitations).  · Headache.  · Light-headedness, especially when you stand up from sitting.  · Fainting (syncope).  Symptoms of very bad dehydration may include:  · Changes in skin, such as:  ¨ Cold and clammy skin.  ¨ Blotchy (mottled) or pale skin.  ¨ Skin that does not quickly return to normal after being lightly pinched and let go (poor skin turgor).  · Changes in body fluids, such as:  ¨ Feeling very thirsty.  ¨ Your eyes making fewer tears.  ¨ Not sweating when body temperature is high, such as in hot weather.  ¨ Your body making very little pee.  · Changes in vital signs, such as:  ¨ Weak pulse.  ¨ Pulse that is more than 100 beats a minute when you are sitting still.  ¨ Fast breathing.  ¨ Low blood pressure.  · Other changes, such as:  ¨ Sunken  eyes.  ¨ Cold hands and feet.  ¨ Confusion.  ¨ Lack of energy (lethargy).  ¨ Trouble waking up from sleep.  ¨ Short-term weight loss.  ¨ Unconsciousness.  Follow these instructions at home:  · If told by your doctor, drink an ORS:  ¨ Make an ORS by using instructions on the package.  ¨ Start by drinking small amounts, about ½ cup (120 mL) every 5-10 minutes.  ¨ Slowly drink more until you have had the amount that your doctor said to have.  · Drink enough clear fluid to keep your pee clear or pale yellow. If you were told to drink an ORS, finish the ORS first, then start slowly drinking clear fluids. Drink fluids such as:  ¨ Water. Do not drink only water by itself. Doing that can make the salt (sodium) level in your body get too low (hyponatremia).  ¨ Ice chips.  ¨ Fruit juice that you have added water to (diluted).  ¨ Low-calorie sports drinks.  · Avoid:  ¨ Alcohol.  ¨ Drinks that have a lot of sugar. These include high-calorie sports drinks, fruit juice that does not have water added, and soda.  ¨ Caffeine.  ¨ Foods that are greasy or have a lot of fat or sugar.  · Take over-the-counter and prescription medicines only as told by your doctor.  · Do not take salt tablets. Doing that can make the salt level in your body get too high (hypernatremia).  · Eat foods that have minerals (electrolytes). Examples include bananas, oranges, potatoes, tomatoes, and spinach.  · Keep all follow-up visits as told by your doctor. This is important.  Contact a doctor if:  · You have belly (abdominal) pain that:  ¨ Gets worse.  ¨ Stays in one area (localizes).  · You have a rash.  · You have a stiff neck.  · You get angry or annoyed more easily than normal (irritability).  · You are more sleepy than normal.  · You have a harder time waking up than normal.  · You feel:  ¨ Weak.  ¨ Dizzy.  ¨ Very thirsty.  · You have peed (urinated) only a small amount of very dark pee during 6-8 hours.  Get help right away if:  · You have symptoms of  very bad dehydration.  · You cannot drink fluids without throwing up (vomiting).  · Your symptoms get worse with treatment.  · You have a fever.  · You have a very bad headache.  · You are throwing up or having watery poop (diarrhea) and it:  ¨ Gets worse.  ¨ Does not go away.  · You have blood or something green (bile) in your throw-up.  · You have blood in your poop (stool). This may cause poop to look black and tarry.  · You have not peed in 6-8 hours.  · You pass out (faint).  · Your heart rate when you are sitting still is more than 100 beats a minute.  · You have trouble breathing.  This information is not intended to replace advice given to you by your health care provider. Make sure you discuss any questions you have with your health care provider.  Document Released: 10/14/2010 Document Revised: 07/07/2017 Document Reviewed: 02/10/2017  [employer] Interactive Patient Education © 2017 [employer]      Sinus Tachycardia  Sinus tachycardia is a kind of fast heartbeat. In sinus tachycardia, the heart beats more than 100 times a minute. Sinus tachycardia starts in a part of the heart called the sinus node. Sinus tachycardia may be harmless, or it may be a sign of a serious condition.  What are the causes?  This condition may be caused by:  · Exercise or exertion.  · A fever.  · Pain.  · Loss of body fluids (dehydration).  · Severe bleeding (hemorrhage).  · Anxiety and stress.  · Certain substances, including:  ¨ Alcohol.  ¨ Caffeine.  ¨ Tobacco and nicotine products.  ¨ Diet pills.  ¨ Illegal drugs.  · Medical conditions including:  ¨ Heart disease.  ¨ An infection.  ¨ An overactive thyroid (hyperthyroidism).  ¨ A lack of red blood cells (anemia).  What are the signs or symptoms?  Symptoms of this condition include:  · A feeling that the heart is beating quickly (palpitations).  · Suddenly noticing your heartbeat (cardiac awareness).  · Dizziness.  · Tiredness (fatigue).  · Shortness of breath.  · Chest  pain.  · Nausea.  · Fainting.  How is this diagnosed?  This condition is diagnosed with:  · A physical exam.  · Other tests, such as:  ¨ Blood tests.  ¨ An electrocardiogram (ECG). This test measures the electrical activity of the heart.  ¨ Holter monitoring. For this test, you wear a device that records your heartbeat for one or more days.  You may be referred to a heart specialist (cardiologist).  How is this treated?  Treatment for this condition depends on the cause or underlying condition. Treatment may involve:  · Treating the underlying condition.  · Taking new medicines or changing your current medicines as told by your health care provider.  · Making changes to your diet or lifestyle.  · Practicing relaxation methods.  Follow these instructions at home:  Lifestyle  · Do not use any products that contain nicotine or tobacco, such as cigarettes and e-cigarettes. If you need help quitting, ask your health care provider.  · Learn relaxation methods, like deep breathing, to help you when you get stressed or anxious.  · Do not use illegal drugs, such as cocaine.  · Do not abuse alcohol. Limit alcohol intake to no more than 1 drink a day for non-pregnant women and 2 drinks a day for men. One drink equals 12 oz of beer, 5 oz of wine, or 1½ oz of hard liquor.  · Find time to rest and relax often. This reduces stress.  · Avoid:  ¨ Caffeine.  ¨ Stimulants such as over-the-counter diet pills or pills that help you to stay awake.  ¨ Situations that cause anxiety or stress.  General instructions  · Drink enough fluids to keep your urine clear or pale yellow.  · Take over-the-counter and prescription medicines only as told by your health care provider.  · Keep all follow-up visits as told by your health care provider. This is important.  Contact a health care provider if:  · You have a fever.  · You have vomiting or diarrhea that keeps happening (is persistent).  Get help right away if:  · You have pain in your chest,  upper arms, jaw, or neck.  · You become weak or dizzy.  · You feel faint.  · You have palpitations that do not go away.  This information is not intended to replace advice given to you by your health care provider. Make sure you discuss any questions you have with your health care provider.  Document Released: 01/25/2006 Document Revised: 07/15/2017 Document Reviewed: 07/01/2016  [employer] Interactive Patient Education © 2017 [employer]

## 2019-05-24 VITALS
RESPIRATION RATE: 16 BRPM | HEART RATE: 93 BPM | BODY MASS INDEX: 22.06 KG/M2 | SYSTOLIC BLOOD PRESSURE: 128 MMHG | DIASTOLIC BLOOD PRESSURE: 81 MMHG | WEIGHT: 154.1 LBS | OXYGEN SATURATION: 92 % | TEMPERATURE: 97.8 F | HEIGHT: 70 IN

## 2019-05-24 PROBLEM — R65.10 SIRS (SYSTEMIC INFLAMMATORY RESPONSE SYNDROME) (HCC): Status: RESOLVED | Noted: 2019-05-19 | Resolved: 2019-05-24

## 2019-05-24 PROBLEM — R13.19 ESOPHAGEAL DYSPHAGIA: Status: RESOLVED | Noted: 2019-05-21 | Resolved: 2019-05-24

## 2019-05-24 PROBLEM — E86.0 DEHYDRATION: Status: RESOLVED | Noted: 2018-12-12 | Resolved: 2019-05-24

## 2019-05-24 PROBLEM — R00.0 TACHYCARDIA: Status: RESOLVED | Noted: 2019-05-19 | Resolved: 2019-05-24

## 2019-05-24 PROBLEM — N17.9 ARF (ACUTE RENAL FAILURE) (HCC): Status: RESOLVED | Noted: 2018-12-12 | Resolved: 2019-05-24

## 2019-05-24 PROCEDURE — 92526 ORAL FUNCTION THERAPY: CPT

## 2019-05-24 PROCEDURE — A9270 NON-COVERED ITEM OR SERVICE: HCPCS | Performed by: HOSPITALIST

## 2019-05-24 PROCEDURE — A9270 NON-COVERED ITEM OR SERVICE: HCPCS | Performed by: INTERNAL MEDICINE

## 2019-05-24 PROCEDURE — 700102 HCHG RX REV CODE 250 W/ 637 OVERRIDE(OP): Performed by: HOSPITALIST

## 2019-05-24 PROCEDURE — 700111 HCHG RX REV CODE 636 W/ 250 OVERRIDE (IP): Performed by: INTERNAL MEDICINE

## 2019-05-24 PROCEDURE — 99239 HOSP IP/OBS DSCHRG MGMT >30: CPT | Performed by: INTERNAL MEDICINE

## 2019-05-24 PROCEDURE — 700102 HCHG RX REV CODE 250 W/ 637 OVERRIDE(OP): Performed by: INTERNAL MEDICINE

## 2019-05-24 RX ORDER — PROCHLORPERAZINE MALEATE 10 MG
10 TABLET ORAL EVERY 6 HOURS PRN
Qty: 30 TAB | Refills: 3 | Status: SHIPPED | OUTPATIENT
Start: 2019-05-24 | End: 2019-06-01

## 2019-05-24 RX ADMIN — METOPROLOL TARTRATE 25 MG: 25 TABLET ORAL at 05:00

## 2019-05-24 RX ADMIN — OXYCODONE HYDROCHLORIDE 30 MG: 100 SOLUTION ORAL at 05:09

## 2019-05-24 RX ADMIN — OXYCODONE HYDROCHLORIDE 30 MG: 100 SOLUTION ORAL at 01:09

## 2019-05-24 RX ADMIN — AMLODIPINE BESYLATE 5 MG: 5 TABLET ORAL at 05:00

## 2019-05-24 RX ADMIN — PROCHLORPERAZINE EDISYLATE 10 MG: 5 INJECTION INTRAMUSCULAR; INTRAVENOUS at 05:00

## 2019-05-24 NOTE — PROGRESS NOTES
Patient request to walk with Pulse Ox to measure oxygen and HR.      Patient HR was 125 with ambulation, O2 99% RA when walking.  Patient denies dizziness, lightheadedness.      Patient states she needed to document her HR to show the doctor the reason why she refused to be discharged.        HR 90's at rest.    Will continue to monitor.      Juan Carlos Rodriguez

## 2019-05-24 NOTE — DISCHARGE SUMMARY
Discharge Summary    CHIEF COMPLAINT ON ADMISSION  Chief Complaint   Patient presents with   • Abdominal Pain       Reason for Admission  EMS     Admission Date  5/19/2019    CODE STATUS  Prior    HPI & HOSPITAL COURSE  This is a 63 y.o. female here with above medical issues. Patient has a very complicated past medical history including multiple surgeries for complicated Crohn's disease along with recurrent issues of her ileostomy along with various psychiatric issues and poorly controlled chronic pain. She was admitted to the hospital for severe dehydration and ongoing esophageal issues. SLP was consulted and GI was consulted and patient underwent an EGD with the following procedures done:    1.  Esophagus increased motility, otherwise unremarkable mucosa.  2.  Schatzki's ring at 40 cm, dilated up to 60-Croatian, with Savary dilators   with good mucosal break.  3.  A 2 cm hiatal hernia.  4.  Unremarkable gastric mucosa.  5.  Unremarkable duodenal mucosa.    She tolerated the procedure well. She continued to have ongoing mild pain, but her inflammatory markers were negative, all of her electrolytes were normal, and her abdominal xray showed no evidence of bowel obstruction. Patient had good ostomy output and told me that is the best her ostomy has been doing in several years with no nausea or vomiting. I spoke with Dr Trinidad, her general surgeon, through the phone and there is not urgent surgical need and she already has a follow up appointment with him in June.         Therefore, she is discharged in fair and stable condition to home with close outpatient follow-up.    The patient met 2-midnight criteria for an inpatient stay at the time of discharge.    Discharge Date  5/24/2019    FOLLOW UP ITEMS POST DISCHARGE  F/U with DR trinidad in June 2019  F/U with her PCP in 1-2 weeks    DISCHARGE DIAGNOSES  Active Problems:    Paroxysmal atrial fibrillation (HCC) POA: Yes    Crohn's disease of small intestine with complication  ([facility]) POA: Yes      Overview: Followed by GI Dr. Cruz      S/p ileostomy      Scope 5/2014-no strictures, fistulas, or new abscesses    Anxiety disorder due to multiple medical problems POA: Yes    Anemia POA: Yes    Chronic pain POA: Yes      Overview: On multiple narcotics including high dose oxycodone and Dilaudid      Valium also on medication list    COPD (chronic obstructive pulmonary disease) ([facility]) POA: Yes      Overview: On oxygen at night    Hypertension POA: Yes  Resolved Problems:    Dehydration POA: Yes    Tachycardia POA: Yes    SIRS (systemic inflammatory response syndrome) ([facility]) POA: Yes    Lactic acidosis POA: Yes    ARF (acute renal failure) ([facility]) POA: Yes    Esophageal dysphagia POA: No      FOLLOW UP  No future appointments.  Kevin Cruz M.D.  [address]  [place] [address]  [phone]    In 1 week  Crohn's complications    Kristel Mahan M.D.  [address]  [address]  [place] [address]  [phone]    Go on 7/24/2019  Please arrive at 1:15 pm for a 1:30 pm appointment with GI.    Swati Cavanaugh M.D.  [address]  [place] [id number] [phone]      The hospital  left a voicemail with the office to call you directly to schedule a follow up appointment. If you do not receive a call within 2 days please call to arrange an appointment.       MEDICATIONS ON DISCHARGE     Medication List      START taking these medications      Instructions   amLODIPine 5 MG Tabs  Commonly known as:  NORVASC   Take 1 Tab by mouth every day.  Dose:  5 mg     prochlorperazine 10 MG Tabs  Commonly known as:  COMPAZINE   Take 1 Tab by mouth every 6 hours as needed for Nausea/Vomiting.  Dose:  10 mg        CONTINUE taking these medications      Instructions   albuterol 108 (90 BASE) MCG/ACT Aers  Commonly known as:  VENTOLIN or PROVENTIL   Inhale 2 Puffs by mouth every 6 hours as needed.  Dose:  2 Puff     cetirizine 10 MG Tabs  Commonly known as:  ZYRTEC   Take 10 mg by mouth every  day.  Dose:  10 mg     estradiol 0.1 MG/GM vaginal cream  Commonly known as:  ESTRACE   Insert 0.5 g in vagina every 72 hours.  Dose:  0.5 g     furosemide 20 MG Tabs  Commonly known as:  LASIX   Take 1 Tab by mouth every day.  Dose:  20 mg     granisetron 1 MG Tabs  Commonly known as:  KYTRIL   Take 1 Tab by mouth every 12 hours as needed for Nausea/Vomiting.  Dose:  1 mg     ipratropium-albuterol 0.5-2.5 (3) MG/3ML nebulizer solution  Commonly known as:  DUONEB   3 mL by Nebulization route 4 times a day as needed for Shortness of Breath.  Dose:  3 mL     metoprolol 25 MG Tabs  Commonly known as:  LOPRESSOR   Take 25 mg by mouth 2 times a day.  Dose:  25 mg     oxyCODONE 20 MG/ML Conc   Take 30 mg by mouth every four hours as needed. 20mg/ml solution  Dose:  30 mg     potassium bicarbonate 25 MEQ tablet  Commonly known as:  KLYTE   Take 1 Tab by mouth every day.  Dose:  25 mEq            Allergies  Allergies   Allergen Reactions   • Azathioprine Sodium Hives   • Carafate [Sucralfate] Rash     Generalizes/Mouth Sores   • Infliximab Hives   • Morphine Swelling   • Roxanol [Morphine Sulfate] Swelling     Throat swells   • Amitriptyline Unspecified     Nightmares     • Demerol Vomiting   • Fentanyl Unspecified     Patches caused skin breakdown, no pain relief   • Lorazepam Unspecified     States give me nightmares.    • Methadone    • Methotrexate Hives and Rash   • Pregabalin Rash     Rash     • Pseudoephedrine Vomiting   • Sulfa Drugs Hives   • Tape Rash     Skin peels off; paper tape   • Celestone [Betamethasone Sodium Phosphate] Unspecified     Pt unable to remember   • Sulfasalazine Unspecified     Pt unable to remember   • Tizanidine Hydrochloride Unspecified     Pt unable to remember       DIET  Normal, ileostomy friendly    ACTIVITY  As tolerated.  Weight bearing as tolerated    CONSULTATIONS  GI    PROCEDURES  EGD as outlined above  DX-ABDOMEN COMPLETE WITH AP OR PA CXR   Final Result      No evidence of bowel  obstruction.            LABORATORY  Lab Results   Component Value Date    SODIUM 139 05/23/2019    POTASSIUM 3.6 05/23/2019    CHLORIDE 105 05/23/2019    CO2 25 05/23/2019    GLUCOSE 114 (H) 05/23/2019    BUN 13 05/23/2019    CREATININE 0.76 05/23/2019    CREATININE 0.89 02/10/2010    GLOMRATE >59 02/10/2010        Lab Results   Component Value Date    WBC 7.2 05/22/2019    WBC 7.9 02/10/2010    HEMOGLOBIN 13.6 05/22/2019    HEMATOCRIT 42.2 05/22/2019    PLATELETCT 220 05/22/2019        Total time of the discharge process exceeds 39 minutes.

## 2019-05-24 NOTE — PROGRESS NOTES
Pt medicated with 30 mg Oxycodone Liquid for pain scale 8/10. First dose education performed, fall precautions in place.

## 2019-05-24 NOTE — PROGRESS NOTES
Received pt report from day CAMERON Phan.    Pt awake, alert, oriented X 4.  Pt sitting in chair  Bed in low locked position, call bell at the bedside, tray table & personal belongings within reach. Non-skid footwear intact. White board updated to reflect plan of care for current shift. Pt door tags reviewed. Bed Alarm OFF.    Assessed patient’s needs and comfort level.    Vitals WNL.    Tele Non-Tele Medical.    Juan Carlos Rodriguez

## 2019-05-24 NOTE — PROGRESS NOTES
Report received. Pt care assumed. Pt sleeping at this time. Pt doesn't appear to be in any pain and no signs of distress. Bed in low, locked position. Bed alarm on. Call light within reach. Treaded socks on pt.  Hourly rounding in place.

## 2019-05-24 NOTE — THERAPY
"Speech Language Therapy dysphagia treatment completed.   Functional Status:  Pt to d/c this am. She has continued with a puree diet and thin liquids (jaw wired) per her preference. Pt stating that her oral cavity sores are improved. No lesions seen with pink and moist oral mucosa. Pt with no dysarthria and voice WNL during session. Pt agreeable to approx 1/4 cup of diced peaches and pears. Pt stating no pain with swallow. No belching cervically auscultated with soft fruit. No delay in swallow. Pt preferring to use straws at this time with thins. Pt with belching following sips of thin liquids using a straw. Pt plans to drink from a cup when she returns home. Pt verbalizing concerns with sanitation while at the hospital with preference for straws during this hospital stay. Pt aware of GI soft texture since this is her normal texture at home per pt related to her Chrons. No coughing, no delay in swallow, with pt at the level for GI soft texture as tolerated.   Recommendations: see above.   Plan of Care: no further tx indicated.   Post-Acute Therapy: no further SLP indicated at this time. Valdo    See \"Rehab Therapy-Acute\" Patient Summary Report for complete documentation.     "

## 2019-05-24 NOTE — PROGRESS NOTES
Pt discharged to home with . Pt escorted down by Joan MELO in a wheelchair. Discharge teaching performed. Questions answered as needed. Discharge paperwork, prescriptions, home medications, and belongings home with pt.

## 2019-05-25 NOTE — DISCHARGE PLANNING
Received phone call from [facility] in regards to appeal process. Notified that patient discharged 5/24/19 and appeal no longer needed at this time.

## 2019-05-30 ENCOUNTER — OFFICE VISIT (OUTPATIENT)
Dept: CARDIOLOGY | Facility: MEDICAL CENTER | Age: 64
End: 2019-05-30
Payer: MEDICARE

## 2019-05-30 VITALS
HEART RATE: 112 BPM | SYSTOLIC BLOOD PRESSURE: 138 MMHG | OXYGEN SATURATION: 98 % | BODY MASS INDEX: 21.76 KG/M2 | DIASTOLIC BLOOD PRESSURE: 90 MMHG | HEIGHT: 70 IN | WEIGHT: 152 LBS

## 2019-05-30 DIAGNOSIS — I48.0 PAROXYSMAL ATRIAL FIBRILLATION (HCC): ICD-10-CM

## 2019-05-30 DIAGNOSIS — I10 ESSENTIAL HYPERTENSION: ICD-10-CM

## 2019-05-30 DIAGNOSIS — I10 ESSENTIAL HYPERTENSION: Primary | ICD-10-CM

## 2019-05-30 PROCEDURE — 99214 OFFICE O/P EST MOD 30 MIN: CPT | Performed by: INTERNAL MEDICINE

## 2019-05-30 RX ORDER — SPIRONOLACTONE 25 MG/1
TABLET ORAL
Refills: 3 | COMMUNITY
Start: 2019-04-13 | End: 2019-06-01

## 2019-05-30 RX ORDER — ASPIRIN 325 MG
325 TABLET, DELAYED RELEASE (ENTERIC COATED) ORAL DAILY
Qty: 60 TAB | Status: ON HOLD | COMMUNITY
End: 2019-06-01

## 2019-05-30 RX ORDER — HYDRALAZINE HYDROCHLORIDE 10 MG/1
10 TABLET, FILM COATED ORAL 3 TIMES DAILY PRN
Qty: 90 TAB | Refills: 3 | Status: SHIPPED | OUTPATIENT
Start: 2019-05-30 | End: 2019-05-30 | Stop reason: SDUPTHER

## 2019-05-30 NOTE — PATIENT INSTRUCTIONS
Please start aspirin 325mg once a day.     Please use hydralazine 10mg once as needed for a systolic blood pressure >150.

## 2019-05-30 NOTE — PROGRESS NOTES
Cardiology Follow-up Consultation Note    Date of note:    5/30/2019  Primary Care Provider: Amanda Bazzi M.D.    Name:             Jana Overton   YOB: 1955  MRN:               [id number]    CC: palpitations.      Patient ID/HPI:   Jana Overton is a 63 y.o. female whose current medical problems include hypertension,  DVT, parosxysmal atrial fibrillation, TRUDY, COPD, and crohn's disease who is here for follow-up.     Clinic visit: 12/27/2017, at that time:     She also has moderate severity left sided chest tightness which radiates down her left arm. This is not necessarily associated with exertion.  She has been told to go to the emergency room multiple times for evaluation, however she continues to refuse as she's scared of getting inadequate treatment there.     SBP at home occasionally down to 70s/40s, HR up to 160s. + LE edema     She is currently not taking metoprolol or lasix. She does not take aspirin daily as it causes severe burning in her stomach.    Does see a pain specialist Dr. Telles and has been working on down-titrating opiates.     Last clinic visit: 12/29/2017  She still has chest pain, occasionally pressure-like at rest, resolves within minutes, also has sharp chest pain at times, associated with movement.     At our visit, 1/24/2018:  Pulse does go very high with exercise up to 140s with very mild exercise. Ziopatch showed periodic SVT and likely atrial fibrillation.  Still having palpitation daily.  No passing out, no falls.     In terms of TRUDY, she started her CPAP and feels much better in terms of her energy level and concentration.     In terms of her hypertension, 90s-100s/60s. Highest is 140s/90s.     Taking torsemide once a week along with potassium.     Still having moderate severity right sided chest pain associated with palpitations and ear pounding.     Interim History:    2 days of drenching sweats, severe fatigue yesterday. Associated with palpitations.   Up to taking metoprolol 25mg four times a day. Usual sitting pulse is 60, last was in the 100s.     Has also had nausea and anorexia and likely crohn's flare. Followed by Dr. Mahan and Dr. Sepulveda.      BP at home in the 110s-160s.  No syncope.     Currently with minimal cardiac symptoms, continues to have abdominal pain.     Review of Systems     + borderline fevers.    Constitution: no weight loss.   HENT: Negative for nosebleeds.    Eyes: Negative for vision loss in left eye and vision loss in right eye.   Respiratory: Negative for hemoptysis.    Gastrointestinal: Negative for hematemesis, hematochezia and melena.   Genitourinary: Negative for hematuria.   Neurological: Negative for focal weakness, numbness and paresthesias.           Past Medical History:   Diagnosis Date   • Anesthesia     difficult Iv stick   • Anxiety    • Arthritis     all over   • ASTHMA    • Atrial fib/flut    • Backpain    • Breath shortness    • Bronchitis     5 years   • Chronic pain    • Colostomy in place ([facility]) 10/14/2010   • COPD (chronic obstructive pulmonary disease) ([facility]) 6/24/2014   • COPD (chronic obstructive pulmonary disease) ([facility]) 6/24/2014   • Crohn's disease of colon ([facility])    • Dyspnea    • History of cardiac murmur    • Hypokalemia 6/24/2014   • Indigestion    • Infectious disease     MRSA, VancoRSA   • Multiple falls 6/24/2014   • Narcotic dependence ([facility]) 9/23/2009   • Obstruction    • Other specified disorder of intestines     crohns disease   • Pain 7/11/13    all over   • Pneumonia     child and mid 30's   • Postherpetic neuralgia 6/24/2014   • Rosacea 6/24/2014   • Sleep apnea          Past Surgical History:   Procedure Laterality Date   • GASTROSCOPY N/A 5/22/2019    Procedure: GASTROSCOPY - WITH DILATION;  Surgeon: Dionicio Cardona M.D.;  Location: SURGERY [facility];  Service: Gastroenterology   • GASTROSCOPY  1/6/2019    Procedure: GASTROSCOPY- WITH DILATION;  Surgeon: Daniel Daniels M.D.;   Location: SURGERY [facility];  Service: Gastroenterology   • ILEOSTOMY  12/29/2018    Procedure: ILEOSTOMY- REVISION;  Surgeon: Kevin Cruz M.D.;  Location: SURGERY [facility];  Service: General   • SIGMOIDOSCOPY FLEX  12/29/2018    Procedure: SIGMOIDOSCOPY FLEX;  Surgeon: Kevin Cruz M.D.;  Location: SURGERY [facility];  Service: General   • EXPLORATORY LAPAROTOMY  12/29/2018    Procedure: EXPLORATORY LAPAROTOMY, lysis of adhesions;  Surgeon: Kevin Cruz M.D.;  Location: SURGERY [facility];  Service: General   • ILEOSTOMY  5/14/2014    Performed by Kevin Cruz M.D. at SURGERY [facility]   • MAMMOPLASTY REDUCTION  7/17/2013    Performed by Janey Burt M.D. at [facility]   • HARDWARE REMOVAL NEURO  7/16/2012    Performed by KEELEY KIM at [facility]   • GASTROSCOPY  10/4/2011    Performed by TYSON MARTINEZ at SURGERY [facility] DAY [facility] ORS   • COLONOSCOPY  10/4/2011    Performed by TYSON MARTINEZ at SURGERY SAME DAY [facility] ORS   • DILATION AND CURETTAGE  9/24/2010    Performed by GAURAV ALY at SURGERY SAME DAY [facility] ORS   • ILEOSTOMY  11/11/2009    Performed by KEVIN CRUZ at [facility]   • GASTROSCOPY WITH BIOPSY  11/22/08    Performed by NEGRITA HUYNH at [facility]   • ABDOMINAL EXPLORATION     • CERVICAL DISK AND FUSION ANTERIOR     • COLON RESECTION     • FOOT SURGERY     • HAND SURGERY     • LUMBAR FUSION ANTERIOR     • LUMPECTOMY     • OTHER ABDOMINAL SURGERY      surgery for chrons disease   • PB REDUCTION OF LARGE BREAST           Current Outpatient Prescriptions   Medication Sig Dispense Refill   • spironolactone (ALDACTONE) 25 MG Tab TK 2 TS PO BID  3   • prochlorperazine (COMPAZINE) 10 MG Tab Take 1 Tab by mouth every 6 hours as needed for Nausea/Vomiting. 30 Tab 3   • potassium bicarbonate (KLYTE) 25 MEQ tablet Take 1 Tab by mouth every day. 30 Tab 0   • cetirizine (ZYRTEC) 10 MG Tab  Take 10 mg by mouth every day.     • estradiol (ESTRACE) 0.1 MG/GM vaginal cream Insert 0.5 g in vagina every 72 hours.     • metoprolol (LOPRESSOR) 25 MG Tab Take 25 mg by mouth 2 times a day.     • granisetron (KYTRIL) 1 MG Tab Take 1 Tab by mouth every 12 hours as needed for Nausea/Vomiting. 30 Tab 1   • amLODIPine (NORVASC) 5 MG Tab Take 1 Tab by mouth every day. (Patient not taking: Reported on 5/30/2019) 30 Tab 1   • furosemide (LASIX) 20 MG Tab Take 1 Tab by mouth every day. (Patient not taking: Reported on 5/30/2019) 30 Tab 0   • albuterol (VENTOLIN OR PROVENTIL) 108 (90 BASE) MCG/ACT Aero Soln Inhale 2 Puffs by mouth every 6 hours as needed.     • ipratropium-albuterol (DUONEB) 0.5-2.5 (3) MG/3ML nebulizer solution 3 mL by Nebulization route 4 times a day as needed for Shortness of Breath.     • oxycodone 20 MG/ML CONC Take 30 mg by mouth every four hours as needed. 20mg/ml solution       No current facility-administered medications for this visit.          Allergies   Allergen Reactions   • Azathioprine Sodium Hives   • Carafate [Sucralfate] Rash     Generalizes/Mouth Sores   • Infliximab Hives   • Morphine Swelling   • Roxanol [Morphine Sulfate] Swelling     Throat swells   • Amitriptyline Unspecified     Nightmares     • Demerol Vomiting   • Fentanyl Unspecified     Patches caused skin breakdown, no pain relief   • Lorazepam Unspecified     States give me nightmares.    • Methadone    • Methotrexate Hives and Rash   • Pregabalin Rash     Rash     • Pseudoephedrine Vomiting   • Sulfa Drugs Hives   • Tape Rash     Skin peels off; paper tape   • Celestone [Betamethasone Sodium Phosphate] Unspecified     Pt unable to remember   • Sulfasalazine Unspecified     Pt unable to remember   • Tizanidine Hydrochloride Unspecified     Pt unable to remember         Family History   Problem Relation Age of Onset   • Lung Disease Mother         copd   • Diabetes Father    • Heart Disease Father    • Diabetes Sister    •  "Cancer Maternal Aunt         breast         Social History     Social History   • Marital status:      Spouse name: N/A   • Number of children: N/A   • Years of education: N/A     Occupational History   • Not on file.     Social History Main Topics   • Smoking status: Never Smoker   • Smokeless tobacco: Never Used      Comment: second hand smoke   • Alcohol use Yes   • Drug use: Yes      Comment: medical marijuana   • Sexual activity: Not on file      Comment:      Other Topics Concern   • Not on file     Social History Narrative   • No narrative on file         Physical Exam:  Ambulatory Vitals  /90 (BP Location: Left arm, Patient Position: Sitting, BP Cuff Size: Adult)   Pulse (!) 112   Ht 1.778 m (5' 10\")   Wt 68.9 kg (152 lb)   SpO2 98%    Oxygen Therapy:  Pulse Oximetry: 98 %  BP Readings from Last 4 Encounters:   05/30/19 138/90   05/24/19 128/81   05/19/19 (!) 162/78   01/08/19 102/54       Weight/BMI: Body mass index is 21.81 kg/m².  Wt Readings from Last 4 Encounters:   05/30/19 68.9 kg (152 lb)   05/23/19 69.9 kg (154 lb 1.6 oz)   05/19/19 69 kg (152 lb 1.9 oz)   12/28/18 76.6 kg (168 lb 14 oz)       General: NAD  Eyes: nl conjunctiva  ENT: OP clear  Neck: JVP <8 cm H2O, no carotid bruits  Lungs: normal respiratory effort, bibasilar crackles  Heart: RRR, 2/6 systolic murmur, no rubs or gallops, trace edema bilateral lower extremities. No LV/RV heave on cardiac palpatation. 2+ bilateral radial pulses.  2+ bilateral dp pulses.   Abdomen: soft, diffuse ttp, non distended, no masses, normal bowel sounds.  No HSM.  Extremities/MSK: no clubbing, no cyanosis  Neurological: No focal sensory deficits  Psychiatric: Appropriate affect, A/O x 3  Skin: Warm extremities    Exam repeated in full and unchanged except for as noted above.        Lab Data Review:  Lab Results   Component Value Date/Time    CHOLSTRLTOT 205 (H) 11/05/2018 07:19 AM     (H) 11/05/2018 07:19 AM    HDL 60 11/05/2018 " 07:19 AM    TRIGLYCERIDE 165 (H) 11/05/2018 07:19 AM       Lab Results   Component Value Date/Time    SODIUM 139 05/23/2019 12:45 AM    POTASSIUM 3.6 05/23/2019 12:45 AM    CHLORIDE 105 05/23/2019 12:45 AM    CO2 25 05/23/2019 12:45 AM    GLUCOSE 114 (H) 05/23/2019 12:45 AM    BUN 13 05/23/2019 12:45 AM    CREATININE 0.76 05/23/2019 12:45 AM    CREATININE 0.89 02/10/2010 04:04 PM    BUNCREATRAT 17 02/10/2010 04:04 PM    GLOMRATE >59 02/10/2010 04:04 PM     Lab Results   Component Value Date/Time    ALKPHOSPHAT 122 (H) 05/20/2019 07:26 PM    ASTSGOT 25 05/20/2019 07:26 PM    ALTSGPT 25 05/20/2019 07:26 PM    TBILIRUBIN 1.0 05/20/2019 07:26 PM      Lab Results   Component Value Date/Time    WBC 7.2 05/22/2019 02:10 AM    WBC 7.9 02/10/2010 04:04 PM     Lab Results   Component Value Date/Time    HBA1C 5.3 08/27/2015 01:50 PM     No components found for: TROP          Cardiac Imaging and Procedures Review:    EKG dated 12/26/2017 : My personal interpretation is NSR, LAE, RsR' in V1/V2     Echo dated 2013:   FINDINGS  Left Ventricle  Normal left ventricular size, thickness, systolic function, and   diastolic function. Left ventricular ejection fraction is 55% to 60%.    Right Ventricle  The right ventricle was normal in size and function.      Right Atrium  The right atrium is normal in size.      Left Atrium  The left atrium is normal in size.      Mitral Valve  The mitral valve is structurally and functionally normal.    Aortic Valve  The aortic valve is structurally and functionally normal.    Tricuspid Valve  The tricuspid valve is structurally and functionally normal. Right   ventricular systolic pressure is estimated to be 14 mmHg.    Pulmonic Valve  The pulmonic valve is structurally and functionally normal.    Pericardium  Normal pericardium without effusion.      Aorta  The aortic root is normal.      CONCLUSIONS  Normal echocardiogram.         TTE (12/27/2017):  CONCLUSIONS  1. Normal right and left ventricular  size and function. Normal regional wall motion  2. Abnormal septal motion consistent with RV volume overload, however estimated right atrial pressure by IVC assessment is normal  3. No significant valve disease or flow abnormalities.   4. Unable to estimate pulmonary artery pressure due to an inadequate tricuspid regurgitant jet.    Compared to the images of the study done 4/3/13 - there has been no significant change.      Nuclear Perfusion Imaging ():   RESULTS:  1.  There were no new ECG changes and no arrhythmia.    2.  Heart rate did increase to 92 and the blood pressure negrito somewhat to   162/77 after an initial drop to 117 systolic but then returned to baseline.  3.  The raw data demonstrated no unexpected extracardiac isotope uptake,   fairly dense collection of isotope in the right mid to lower abdomen.  4.  Rest-stress image comparison reveals no perfusion abnormalities in any of   the multiple SPECT image panels.  5.  The gated wall motion study demonstrated low cardiac volumes with very   small end systolic volume.  The global contractility was normal, EF 74%.    Regional contractility also normal.     SUMMARY:  Lexiscan stress myocardial perfusion stress imaging with technetium   99m tetrofosmin demonstratin.  No infarct or ischemia.  2.  Normal global and hyperdynamic regional function, EF 74%.  3.  No ECG changes or arrhythmia.  4.  No prior for comparison.              Radiology test Review:  CXR:   The mediastinal and cardiac silhouette is unremarkable.    The pulmonary vascularity is within normal limits.    The lung parenchyma demonstrates no airspace process. Calcified granuloma in the right lateral mid hemithorax is unchanged..    There is no significant pleural effusion.    There is no visible pneumothorax.    There are no acute bony abnormalities.   Impression        1.  No evidence of acute cardiopulmonary process.         PET 2018:  ELECTROCARDIOGRAPHIC FINDINGS:  EKG review  shows a normal sinus rhythm with no   ischemic ST segment changes at rest or stress.     SCINTOGRAPHIC FINDINGS:  There is normal homogenous tracer uptake at rest and   stress.     GATED WALL MOTION FINDINGS:  Show an ejection fraction of 72% at rest, which   increases to 78% at peak stress.     CONCLUSIONS AND IMPRESSION:  Normal cardiac stress test.        Medical Decision Makin. Atrial fibrillation, unspecified type (CMS-HCC)  Paroxysmal, symptomatic.  Also has occasional short runs of symptomatic SVT.  given normotension at home, her [id number] score of 2.  We discussed at length risk/benefit of anticoagulation given her history of crohns, and she has opted for only anticoagulation with aspirin at this time.  -continue aspirin for AC for now. She will try 81mg PO daily at first and I did tell her 325mg PO daily has extra stroke prevention benefit.   -continue metoprolol to 50mg PO bid for rate control (she takes this 25mg qid at times which is fine).    2. Other chest pain  Previous negative stress test, no chest pain symptoms recently.   -sucralfate for GI ppx while on aspirin.   -ED precautions discussed.   -cont metoprolol     3. Essential (primary) hypertension   Likely white coat hypertension given very low readings at home that she has confirmed with her PCP's blood pressure cuff. Her Bps have recently been labile possibly due to dehydration and variable bowel habits  -cont spironolactone  -hydralazine 10mg PO prn SBP >150  -continue metoprolol.      4. Leg swelling  -continue spironolactone daily. Stopped lasix/potassium prn as she has had some dehydration episodes.     5. TRUDY  -continue CPAP, many of symptoms much improved.     I believe most of her current complaints are crohn's/bowel related and she has follow-up with her surgeon later today and Dr. Mahan after this.  I discussed ER precautions for any severe possibly cardiac symptoms, but otherwise she is medically optimized for any  procedures at this point and can hold aspirin up to 5 days prior to any procedures.          Return in about 6 months (around 11/30/2019).      Harvey Monahan MD  [facility], [facility]  [address] [address]  Phone: [phone]  Fax: [phone]

## 2019-05-31 RX ORDER — HYDRALAZINE HYDROCHLORIDE 10 MG/1
TABLET, FILM COATED ORAL
Qty: 270 TAB | Refills: 1 | Status: SHIPPED | OUTPATIENT
Start: 2019-05-31 | End: 2019-06-01

## 2019-06-01 ENCOUNTER — APPOINTMENT (OUTPATIENT)
Dept: RADIOLOGY | Facility: MEDICAL CENTER | Age: 64
DRG: 103 | End: 2019-06-01
Attending: EMERGENCY MEDICINE
Payer: MEDICARE

## 2019-06-01 ENCOUNTER — HOSPITAL ENCOUNTER (INPATIENT)
Facility: MEDICAL CENTER | Age: 64
LOS: 5 days | DRG: 103 | End: 2019-06-08
Attending: EMERGENCY MEDICINE | Admitting: INTERNAL MEDICINE
Payer: MEDICARE

## 2019-06-01 DIAGNOSIS — E86.0 DEHYDRATION: ICD-10-CM

## 2019-06-01 DIAGNOSIS — R52 PAIN: ICD-10-CM

## 2019-06-01 DIAGNOSIS — R11.2 NAUSEA AND VOMITING, INTRACTABILITY OF VOMITING NOT SPECIFIED, UNSPECIFIED VOMITING TYPE: ICD-10-CM

## 2019-06-01 PROBLEM — M79.606 LEG PAIN: Status: ACTIVE | Noted: 2019-06-01

## 2019-06-01 PROBLEM — N17.9 ACUTE KIDNEY INJURY (HCC): Status: ACTIVE | Noted: 2019-06-01

## 2019-06-01 LAB
ALBUMIN SERPL BCP-MCNC: 4.5 G/DL (ref 3.2–4.9)
ALBUMIN/GLOB SERPL: 1.3 G/DL
ALP SERPL-CCNC: 98 U/L (ref 30–99)
ALT SERPL-CCNC: 14 U/L (ref 2–50)
ANION GAP SERPL CALC-SCNC: 9 MMOL/L (ref 0–11.9)
AST SERPL-CCNC: 27 U/L (ref 12–45)
BILIRUB SERPL-MCNC: 0.6 MG/DL (ref 0.1–1.5)
BUN SERPL-MCNC: 23 MG/DL (ref 8–22)
CALCIUM SERPL-MCNC: 10.2 MG/DL (ref 8.5–10.5)
CHLORIDE SERPL-SCNC: 104 MMOL/L (ref 96–112)
CO2 SERPL-SCNC: 20 MMOL/L (ref 20–33)
CREAT SERPL-MCNC: 1.16 MG/DL (ref 0.5–1.4)
GLOBULIN SER CALC-MCNC: 3.4 G/DL (ref 1.9–3.5)
GLUCOSE SERPL-MCNC: 86 MG/DL (ref 65–99)
LIPASE SERPL-CCNC: 25 U/L (ref 11–82)
POTASSIUM SERPL-SCNC: 5 MMOL/L (ref 3.6–5.5)
PROT SERPL-MCNC: 7.9 G/DL (ref 6–8.2)
SODIUM SERPL-SCNC: 133 MMOL/L (ref 135–145)

## 2019-06-01 PROCEDURE — 96372 THER/PROPH/DIAG INJ SC/IM: CPT

## 2019-06-01 PROCEDURE — 700105 HCHG RX REV CODE 258: Performed by: EMERGENCY MEDICINE

## 2019-06-01 PROCEDURE — 700111 HCHG RX REV CODE 636 W/ 250 OVERRIDE (IP): Performed by: INTERNAL MEDICINE

## 2019-06-01 PROCEDURE — 74022 RADEX COMPL AQT ABD SERIES: CPT

## 2019-06-01 PROCEDURE — 700111 HCHG RX REV CODE 636 W/ 250 OVERRIDE (IP): Performed by: EMERGENCY MEDICINE

## 2019-06-01 PROCEDURE — G0378 HOSPITAL OBSERVATION PER HR: HCPCS

## 2019-06-01 PROCEDURE — 96375 TX/PRO/DX INJ NEW DRUG ADDON: CPT

## 2019-06-01 PROCEDURE — 96374 THER/PROPH/DIAG INJ IV PUSH: CPT

## 2019-06-01 PROCEDURE — 83690 ASSAY OF LIPASE: CPT

## 2019-06-01 PROCEDURE — 700105 HCHG RX REV CODE 258: Performed by: INTERNAL MEDICINE

## 2019-06-01 PROCEDURE — 80053 COMPREHEN METABOLIC PANEL: CPT

## 2019-06-01 PROCEDURE — 99285 EMERGENCY DEPT VISIT HI MDM: CPT

## 2019-06-01 PROCEDURE — 99220 PR INITIAL OBSERVATION CARE,LEVL III: CPT | Performed by: INTERNAL MEDICINE

## 2019-06-01 RX ORDER — AMOXICILLIN 250 MG
2 CAPSULE ORAL 2 TIMES DAILY
Status: DISCONTINUED | OUTPATIENT
Start: 2019-06-01 | End: 2019-06-08 | Stop reason: HOSPADM

## 2019-06-01 RX ORDER — SPIRONOLACTONE 25 MG/1
50 TABLET ORAL 2 TIMES DAILY
COMMUNITY
End: 2019-06-11 | Stop reason: SDUPTHER

## 2019-06-01 RX ORDER — AMLODIPINE BESYLATE 5 MG/1
5 TABLET ORAL DAILY
Status: DISCONTINUED | OUTPATIENT
Start: 2019-06-02 | End: 2019-06-05

## 2019-06-01 RX ORDER — SODIUM CHLORIDE 9 MG/ML
INJECTION, SOLUTION INTRAVENOUS CONTINUOUS
Status: DISCONTINUED | OUTPATIENT
Start: 2019-06-01 | End: 2019-06-05

## 2019-06-01 RX ORDER — ONDANSETRON 2 MG/ML
4 INJECTION INTRAMUSCULAR; INTRAVENOUS ONCE
Status: COMPLETED | OUTPATIENT
Start: 2019-06-01 | End: 2019-06-01

## 2019-06-01 RX ORDER — ONDANSETRON 2 MG/ML
4 INJECTION INTRAMUSCULAR; INTRAVENOUS ONCE
Status: DISCONTINUED | OUTPATIENT
Start: 2019-06-01 | End: 2019-06-01

## 2019-06-01 RX ORDER — DIAZEPAM 5 MG/ML
5 INJECTION, SOLUTION INTRAMUSCULAR; INTRAVENOUS EVERY 6 HOURS PRN
Status: DISCONTINUED | OUTPATIENT
Start: 2019-06-01 | End: 2019-06-08 | Stop reason: HOSPADM

## 2019-06-01 RX ORDER — POLYETHYLENE GLYCOL 3350 17 G/17G
1 POWDER, FOR SOLUTION ORAL
Status: DISCONTINUED | OUTPATIENT
Start: 2019-06-01 | End: 2019-06-08 | Stop reason: HOSPADM

## 2019-06-01 RX ORDER — ALBUTEROL SULFATE 90 UG/1
2 AEROSOL, METERED RESPIRATORY (INHALATION) EVERY 6 HOURS PRN
Status: DISCONTINUED | OUTPATIENT
Start: 2019-06-01 | End: 2019-06-08 | Stop reason: HOSPADM

## 2019-06-01 RX ORDER — HYDROMORPHONE HYDROCHLORIDE 1 MG/ML
1 INJECTION, SOLUTION INTRAMUSCULAR; INTRAVENOUS; SUBCUTANEOUS ONCE
Status: COMPLETED | OUTPATIENT
Start: 2019-06-01 | End: 2019-06-01

## 2019-06-01 RX ORDER — ONDANSETRON 4 MG/1
4 TABLET, ORALLY DISINTEGRATING ORAL EVERY 4 HOURS PRN
Status: DISCONTINUED | OUTPATIENT
Start: 2019-06-01 | End: 2019-06-08 | Stop reason: HOSPADM

## 2019-06-01 RX ORDER — ACETAMINOPHEN 325 MG/1
650 TABLET ORAL EVERY 6 HOURS PRN
Status: DISCONTINUED | OUTPATIENT
Start: 2019-06-01 | End: 2019-06-08 | Stop reason: HOSPADM

## 2019-06-01 RX ORDER — DIAZEPAM 5 MG/1
5 TABLET ORAL EVERY 6 HOURS PRN
COMMUNITY
End: 2021-10-28

## 2019-06-01 RX ORDER — HYDROMORPHONE HYDROCHLORIDE 1 MG/ML
1 INJECTION, SOLUTION INTRAMUSCULAR; INTRAVENOUS; SUBCUTANEOUS ONCE
Status: DISCONTINUED | OUTPATIENT
Start: 2019-06-01 | End: 2019-06-01

## 2019-06-01 RX ORDER — OXYCODONE HCL 20 MG/ML
30 CONCENTRATE, ORAL ORAL EVERY 4 HOURS PRN
Status: DISCONTINUED | OUTPATIENT
Start: 2019-06-01 | End: 2019-06-06

## 2019-06-01 RX ORDER — HYDRALAZINE HYDROCHLORIDE 10 MG/1
10 TABLET, FILM COATED ORAL 3 TIMES DAILY PRN
Status: ON HOLD | COMMUNITY
End: 2021-01-20

## 2019-06-01 RX ORDER — PROCHLORPERAZINE MALEATE 10 MG
10 TABLET ORAL EVERY 6 HOURS PRN
Status: DISCONTINUED | OUTPATIENT
Start: 2019-06-01 | End: 2019-06-08 | Stop reason: HOSPADM

## 2019-06-01 RX ORDER — HYDROMORPHONE HYDROCHLORIDE 2 MG/ML
0.5 INJECTION, SOLUTION INTRAMUSCULAR; INTRAVENOUS; SUBCUTANEOUS
Status: DISCONTINUED | OUTPATIENT
Start: 2019-06-01 | End: 2019-06-06

## 2019-06-01 RX ORDER — SODIUM CHLORIDE, SODIUM LACTATE, POTASSIUM CHLORIDE, CALCIUM CHLORIDE 600; 310; 30; 20 MG/100ML; MG/100ML; MG/100ML; MG/100ML
1000 INJECTION, SOLUTION INTRAVENOUS ONCE
Status: COMPLETED | OUTPATIENT
Start: 2019-06-01 | End: 2019-06-01

## 2019-06-01 RX ORDER — AMLODIPINE BESYLATE 5 MG/1
5 TABLET ORAL DAILY
COMMUNITY
End: 2019-06-11

## 2019-06-01 RX ORDER — PROCHLORPERAZINE MALEATE 10 MG
10 TABLET ORAL EVERY 6 HOURS PRN
Status: ON HOLD | COMMUNITY
End: 2021-01-20

## 2019-06-01 RX ORDER — ASPIRIN 81 MG/1
81 TABLET, CHEWABLE ORAL DAILY
Status: ON HOLD | COMMUNITY
End: 2021-01-22

## 2019-06-01 RX ORDER — DIAZEPAM 5 MG/1
2.5-5 TABLET ORAL EVERY 6 HOURS PRN
Status: DISCONTINUED | OUTPATIENT
Start: 2019-06-01 | End: 2019-06-08 | Stop reason: HOSPADM

## 2019-06-01 RX ORDER — BISACODYL 10 MG
10 SUPPOSITORY, RECTAL RECTAL
Status: DISCONTINUED | OUTPATIENT
Start: 2019-06-01 | End: 2019-06-08 | Stop reason: HOSPADM

## 2019-06-01 RX ORDER — ONDANSETRON 2 MG/ML
4 INJECTION INTRAMUSCULAR; INTRAVENOUS EVERY 4 HOURS PRN
Status: DISCONTINUED | OUTPATIENT
Start: 2019-06-01 | End: 2019-06-07

## 2019-06-01 RX ORDER — PROCHLORPERAZINE MALEATE 10 MG
10 TABLET ORAL EVERY 6 HOURS PRN
Status: DISCONTINUED | OUTPATIENT
Start: 2019-06-01 | End: 2019-06-01

## 2019-06-01 RX ORDER — METOPROLOL TARTRATE 50 MG/1
50 TABLET, FILM COATED ORAL 2 TIMES DAILY
Status: DISCONTINUED | OUTPATIENT
Start: 2019-06-01 | End: 2019-06-05

## 2019-06-01 RX ORDER — ENALAPRILAT 1.25 MG/ML
1.25 INJECTION INTRAVENOUS EVERY 6 HOURS PRN
Status: DISCONTINUED | OUTPATIENT
Start: 2019-06-01 | End: 2019-06-02

## 2019-06-01 RX ADMIN — HYDROMORPHONE HYDROCHLORIDE 1 MG: 1 INJECTION, SOLUTION INTRAMUSCULAR; INTRAVENOUS; SUBCUTANEOUS at 17:28

## 2019-06-01 RX ADMIN — HYDROMORPHONE HYDROCHLORIDE 1 MG: 1 INJECTION, SOLUTION INTRAMUSCULAR; INTRAVENOUS; SUBCUTANEOUS at 19:16

## 2019-06-01 RX ADMIN — ONDANSETRON 4 MG: 2 INJECTION INTRAMUSCULAR; INTRAVENOUS at 17:29

## 2019-06-01 RX ADMIN — ONDANSETRON 4 MG: 2 INJECTION INTRAMUSCULAR; INTRAVENOUS at 19:16

## 2019-06-01 RX ADMIN — SODIUM CHLORIDE, POTASSIUM CHLORIDE, SODIUM LACTATE AND CALCIUM CHLORIDE 1000 ML: 600; 310; 30; 20 INJECTION, SOLUTION INTRAVENOUS at 19:16

## 2019-06-01 RX ADMIN — SODIUM CHLORIDE: 9 INJECTION, SOLUTION INTRAVENOUS at 22:09

## 2019-06-01 RX ADMIN — DIAZEPAM 5 MG: 5 INJECTION, SOLUTION INTRAMUSCULAR; INTRAVENOUS at 23:33

## 2019-06-01 ASSESSMENT — ENCOUNTER SYMPTOMS
DEPRESSION: 0
NAUSEA: 1
MYALGIAS: 0
CONSTIPATION: 0
CHILLS: 0
HEADACHES: 0
DIARRHEA: 0
DIZZINESS: 0
VOMITING: 1
COUGH: 0
SPUTUM PRODUCTION: 0
SHORTNESS OF BREATH: 0
ABDOMINAL PAIN: 1
PALPITATIONS: 0
WEAKNESS: 0
FALLS: 0
TINGLING: 0
LOSS OF CONSCIOUSNESS: 0
STRIDOR: 0
FEVER: 0

## 2019-06-01 ASSESSMENT — PATIENT HEALTH QUESTIONNAIRE - PHQ9
1. LITTLE INTEREST OR PLEASURE IN DOING THINGS: NOT AT ALL
SUM OF ALL RESPONSES TO PHQ9 QUESTIONS 1 AND 2: 0
2. FEELING DOWN, DEPRESSED, IRRITABLE, OR HOPELESS: NOT AT ALL
2. FEELING DOWN, DEPRESSED, IRRITABLE, OR HOPELESS: NOT AT ALL
SUM OF ALL RESPONSES TO PHQ9 QUESTIONS 1 AND 2: 0
1. LITTLE INTEREST OR PLEASURE IN DOING THINGS: NOT AT ALL

## 2019-06-01 NOTE — ED PROVIDER NOTES
"ED Provider Note    ER PROVIDER NOTE    CHIEF COMPLAINT  Chief Complaint   Patient presents with   • Migraine   • Pain     \"from head to toe, I'm in a Crohn's flare also\"       HPI  Jana Overton is a 63 y.o. female who presents to the emergency department stating \"my surgeon sent me in for a ostomy revision\" patient has a quite complex past medical history with Crohn's, multiple abdominal surgeries, chronic pain, states she has been in touch with Dr. Cruz and was recommended to come in for revision.  She reports intermittent nausea and vomiting over the last few weeks, as well as \"all over body pain\" she reports her ostomy has been having appropriate amount of output although seems to have delayed appearance of food items, stating that \"things I ate 5 weeks ago are coming out now\" she denies any fevers or chills, no blood in her ostomy output or emesis.  Also is reporting migraine that has been present ever since her last admission a few weeks ago    REVIEW OF SYSTEMS  Pertinent positives include body pain, vomiting. Pertinent negatives include no fever. See HPI for details. All other systems reviewed and are negative.    PAST MEDICAL HISTORY   has a past medical history of Anesthesia; Anxiety; Arthritis; ASTHMA; Atrial fib/flut; Backpain; Breath shortness; Bronchitis; Chronic pain; Colostomy in place ([facility]) (10/14/2010); COPD (chronic obstructive pulmonary disease) ([facility]) (6/24/2014); COPD (chronic obstructive pulmonary disease) ([facility]) (6/24/2014); Crohn's disease of colon ([facility]); Dyspnea; History of cardiac murmur; Hypokalemia (6/24/2014); Indigestion; Infectious disease; Multiple falls (6/24/2014); Narcotic dependence ([facility]) (9/23/2009); Obstruction; Other specified disorder of intestines; Pain (7/11/13); Pneumonia; Postherpetic neuralgia (6/24/2014); Rosacea (6/24/2014); and Sleep apnea.    SURGICAL HISTORY   has a past surgical history that includes lumbar fusion anterior; cervical disk and fusion anterior; foot " surgery; hand surgery; other abdominal surgery; gastroscopy with biopsy (11/22/08); ileostomy (11/11/2009); dilation and curettage (9/24/2010); gastroscopy (10/4/2011); colonoscopy (10/4/2011); hardware removal neuro (7/16/2012); mammoplasty reduction (7/17/2013); ileostomy (5/14/2014); reduction of large breast; abdominal exploration; lumpectomy; colon resection; ileostomy (12/29/2018); sigmoidoscopy flex (12/29/2018); exploratory laparotomy (12/29/2018); gastroscopy (1/6/2019); and gastroscopy (N/A, 5/22/2019).    FAMILY HISTORY  Family History   Problem Relation Age of Onset   • Lung Disease Mother         copd   • Diabetes Father    • Heart Disease Father    • Diabetes Sister    • Cancer Maternal Aunt         breast       SOCIAL HISTORY  Social History     Social History   • Marital status:      Spouse name: N/A   • Number of children: N/A   • Years of education: N/A     Social History Main Topics   • Smoking status: Never Smoker   • Smokeless tobacco: Never Used      Comment: second hand smoke   • Alcohol use Yes   • Drug use: Yes      Comment: medical marijuana   • Sexual activity: Not on file      Comment:      Other Topics Concern   • Not on file     Social History Narrative   • No narrative on file      History   Drug Use     Comment: medical marijuana       CURRENT MEDICATIONS  Home Medications    **Home medications have not yet been reviewed for this encounter**         ALLERGIES  Allergies   Allergen Reactions   • Azathioprine Sodium Hives   • Carafate [Sucralfate] Rash     Generalizes/Mouth Sores   • Infliximab Hives   • Morphine Swelling   • Roxanol [Morphine Sulfate] Swelling     Throat swells   • Amitriptyline Unspecified     Nightmares     • Demerol Vomiting   • Fentanyl Unspecified     Patches caused skin breakdown, no pain relief   • Lorazepam Unspecified     States give me nightmares.    • Methadone    • Methotrexate Hives and Rash   • Pregabalin Rash     Rash     • Pseudoephedrine  Vomiting   • Sulfa Drugs Hives   • Tape Rash     Skin peels off; paper tape   • Celestone [Betamethasone Sodium Phosphate] Unspecified     Pt unable to remember   • Sulfasalazine Unspecified     Pt unable to remember   • Tizanidine Hydrochloride Unspecified     Pt unable to remember       PHYSICAL EXAM  VITAL SIGNS: /84   Pulse (!) 109   Temp 37 °C (98.6 °F) (Temporal)   Resp 18   Ht 1.829 m (6')   Wt 68.7 kg (151 lb 7.3 oz)   SpO2 95%   BMI 20.54 kg/m²   Pulse ox interpretation: I interpret this pulse ox as normal.    Constitutional: Alert in no apparent distress.  HENT: No signs of trauma, Bilateral external ears normal, Nose normal.  Mucous membranes dry  Eyes: Pupils are equal and reactive, Conjunctiva normal, Non-icteric.   Neck: Normal range of motion, No tenderness, Supple, No stridor.   Lymphatic: No lymphadenopathy noted.   Cardiovascular: Tachycardic, regular no murmurs.   Thorax & Lungs: Normal breath sounds, No respiratory distress, No wheezing, No chest tenderness.   Abdomen: Bowel sounds normal, Soft, No tenderness, No masses, No pulsatile masses. No peritoneal signs.  Skin: Warm, Dry, No erythema, No rash.   Back: No bony tenderness, No CVA tenderness.   Extremities: Intact distal pulses, No edema, No tenderness, No cyanosis, Negative Jessica's sign.  Musculoskeletal: Good range of motion in all major joints. No tenderness to palpation or major deformities noted.   Neurologic: Alert , Normal motor function, Normal sensory function, No focal deficits noted.   Psychiatric: Affect normal, Judgment normal, Mood normal.     DIAGNOSTIC STUDIES / PROCEDURES        LABS  Labs Reviewed   COMP METABOLIC PANEL - Abnormal; Notable for the following:        Result Value    Sodium 133 (*)     Bun 23 (*)     All other components within normal limits   ESTIMATED GFR - Abnormal; Notable for the following:     GFR If  57 (*)     GFR If Non  47 (*)     All other components  within normal limits   LIPASE   CBC WITH DIFFERENTIAL       All labs reviewed by me.    RADIOLOGY  DX-ABDOMEN COMPLETE WITH AP OR PA CXR   Final Result         1. No acute abnormality detected.        The radiologist's interpretation of all radiological studies have been reviewed by me.    COURSE & MEDICAL DECISION MAKING  Nursing notes, VS, PMSFHx reviewed in chart.    3:40 PM Patient seen and examined at bedside. Patient will be treated with IV fluids, hydromorphone, Zofran. Ordered for labs to evaluate her symptoms.     Discussed case with Dr. Drake who is on-call for Dr. mustafa, no surgical plan pending for this patient, if needs admission, would be medical admission for rehydration,     HYDRATION: Based on the patient's presentation of Acute Vomiting and Dehydration the patient was given IV fluids. IV Hydration was used because oral hydration was not as rapid as required. Upon recheck following hydration, the patient was Improved.     6:32 PM  Patient with continued nausea, states is unable to tolerate orals, plan for admission    Discussed case with Dr. Warner for admission      Decision Making:  This is a 63 y.o. female presented with nausea vomiting as well as chronic pain.  Patient is clinically dehydrated, and as is having difficulty tolerating orals will be admitted for further care and IV hydration as well as symptom management.  At this time does not appear to have any evidence of obstruction perforation or other acute surgical process    Patient is admitted in stable condition    FINAL IMPRESSION  1. Dehydration    2. Pain    3. Nausea and vomiting, intractability of vomiting not specified, unspecified vomiting type         The note accurately reflects work and decisions made by me.  Edwin Oh  6/1/2019  7:18 PM

## 2019-06-01 NOTE — ED TRIAGE NOTES
".  Chief Complaint   Patient presents with   • Migraine   • Pain     \"from head to toe, I'm in a Crohn's flare also\"     ./84   Pulse (!) 109   Temp 37 °C (98.6 °F) (Temporal)   Resp 18   Ht 1.829 m (6')   Wt 68.7 kg (151 lb 7.3 oz)   SpO2 95%   BMI 20.54 kg/m²     BIB EMS with multiple complaints, extensive medical hx, educated on triage process, placed in lobby, told to inform staff of any changes in condition.    "

## 2019-06-02 ENCOUNTER — APPOINTMENT (OUTPATIENT)
Dept: RADIOLOGY | Facility: MEDICAL CENTER | Age: 64
DRG: 103 | End: 2019-06-02
Attending: FAMILY MEDICINE
Payer: MEDICARE

## 2019-06-02 ENCOUNTER — APPOINTMENT (OUTPATIENT)
Dept: RADIOLOGY | Facility: MEDICAL CENTER | Age: 64
DRG: 103 | End: 2019-06-02
Attending: INTERNAL MEDICINE
Payer: MEDICARE

## 2019-06-02 LAB
ANION GAP SERPL CALC-SCNC: 11 MMOL/L (ref 0–11.9)
APPEARANCE UR: CLEAR
BACTERIA #/AREA URNS HPF: NEGATIVE /HPF
BILIRUB UR QL STRIP.AUTO: NEGATIVE
BUN SERPL-MCNC: 17 MG/DL (ref 8–22)
CALCIUM SERPL-MCNC: 9.2 MG/DL (ref 8.5–10.5)
CHLORIDE SERPL-SCNC: 106 MMOL/L (ref 96–112)
CO2 SERPL-SCNC: 22 MMOL/L (ref 20–33)
COLOR UR: YELLOW
CREAT SERPL-MCNC: 0.99 MG/DL (ref 0.5–1.4)
EPI CELLS #/AREA URNS HPF: NEGATIVE /HPF
ERYTHROCYTE [DISTWIDTH] IN BLOOD BY AUTOMATED COUNT: 42.6 FL (ref 35.9–50)
GLUCOSE SERPL-MCNC: 80 MG/DL (ref 65–99)
GLUCOSE UR STRIP.AUTO-MCNC: NEGATIVE MG/DL
HCT VFR BLD AUTO: 37 % (ref 37–47)
HGB BLD-MCNC: 12 G/DL (ref 12–16)
HYALINE CASTS #/AREA URNS LPF: ABNORMAL /LPF
KETONES UR STRIP.AUTO-MCNC: 15 MG/DL
LEUKOCYTE ESTERASE UR QL STRIP.AUTO: ABNORMAL
MCH RBC QN AUTO: 29.1 PG (ref 27–33)
MCHC RBC AUTO-ENTMCNC: 32.4 G/DL (ref 33.6–35)
MCV RBC AUTO: 89.8 FL (ref 81.4–97.8)
MICRO URNS: ABNORMAL
NITRITE UR QL STRIP.AUTO: NEGATIVE
PH UR STRIP.AUTO: 5 [PH]
PLATELET # BLD AUTO: 226 K/UL (ref 164–446)
PMV BLD AUTO: 10.2 FL (ref 9–12.9)
POTASSIUM SERPL-SCNC: 3.6 MMOL/L (ref 3.6–5.5)
PROT UR QL STRIP: NEGATIVE MG/DL
RBC # BLD AUTO: 4.12 M/UL (ref 4.2–5.4)
RBC # URNS HPF: ABNORMAL /HPF
RBC UR QL AUTO: NEGATIVE
SODIUM SERPL-SCNC: 139 MMOL/L (ref 135–145)
SP GR UR STRIP.AUTO: 1.02
UROBILINOGEN UR STRIP.AUTO-MCNC: 0.2 MG/DL
WBC # BLD AUTO: 7.4 K/UL (ref 4.8–10.8)
WBC #/AREA URNS HPF: ABNORMAL /HPF

## 2019-06-02 PROCEDURE — 92610 EVALUATE SWALLOWING FUNCTION: CPT

## 2019-06-02 PROCEDURE — 96372 THER/PROPH/DIAG INJ SC/IM: CPT

## 2019-06-02 PROCEDURE — 85027 COMPLETE CBC AUTOMATED: CPT

## 2019-06-02 PROCEDURE — 96376 TX/PRO/DX INJ SAME DRUG ADON: CPT

## 2019-06-02 PROCEDURE — 96375 TX/PRO/DX INJ NEW DRUG ADDON: CPT

## 2019-06-02 PROCEDURE — 80048 BASIC METABOLIC PNL TOTAL CA: CPT

## 2019-06-02 PROCEDURE — G0378 HOSPITAL OBSERVATION PER HR: HCPCS

## 2019-06-02 PROCEDURE — A9270 NON-COVERED ITEM OR SERVICE: HCPCS | Performed by: INTERNAL MEDICINE

## 2019-06-02 PROCEDURE — A6213 FOAM DRG >16<=48 SQ IN W/BDR: HCPCS | Performed by: NURSE PRACTITIONER

## 2019-06-02 PROCEDURE — 700111 HCHG RX REV CODE 636 W/ 250 OVERRIDE (IP): Performed by: INTERNAL MEDICINE

## 2019-06-02 PROCEDURE — 700105 HCHG RX REV CODE 258: Performed by: INTERNAL MEDICINE

## 2019-06-02 PROCEDURE — 700102 HCHG RX REV CODE 250 W/ 637 OVERRIDE(OP): Performed by: INTERNAL MEDICINE

## 2019-06-02 PROCEDURE — 81001 URINALYSIS AUTO W/SCOPE: CPT

## 2019-06-02 PROCEDURE — 74176 CT ABD & PELVIS W/O CONTRAST: CPT

## 2019-06-02 PROCEDURE — 71045 X-RAY EXAM CHEST 1 VIEW: CPT

## 2019-06-02 PROCEDURE — 93970 EXTREMITY STUDY: CPT

## 2019-06-02 PROCEDURE — 99226 PR SUBSEQUENT OBSERVATION CARE,LEVEL III: CPT | Performed by: FAMILY MEDICINE

## 2019-06-02 RX ORDER — HYDRALAZINE HYDROCHLORIDE 20 MG/ML
10 INJECTION INTRAMUSCULAR; INTRAVENOUS EVERY 6 HOURS PRN
Status: DISCONTINUED | OUTPATIENT
Start: 2019-06-02 | End: 2019-06-07

## 2019-06-02 RX ADMIN — SODIUM CHLORIDE: 9 INJECTION, SOLUTION INTRAVENOUS at 17:23

## 2019-06-02 RX ADMIN — DIAZEPAM 5 MG: 5 INJECTION, SOLUTION INTRAMUSCULAR; INTRAVENOUS at 06:39

## 2019-06-02 RX ADMIN — METOPROLOL TARTRATE 50 MG: 50 TABLET ORAL at 20:14

## 2019-06-02 RX ADMIN — ENOXAPARIN SODIUM 40 MG: 100 INJECTION SUBCUTANEOUS at 06:39

## 2019-06-02 RX ADMIN — HYDROMORPHONE HYDROCHLORIDE 0.5 MG: 2 INJECTION, SOLUTION INTRAMUSCULAR; INTRAVENOUS; SUBCUTANEOUS at 17:06

## 2019-06-02 RX ADMIN — DIAZEPAM 5 MG: 5 INJECTION, SOLUTION INTRAMUSCULAR; INTRAVENOUS at 14:06

## 2019-06-02 RX ADMIN — HYDROMORPHONE HYDROCHLORIDE 0.5 MG: 2 INJECTION, SOLUTION INTRAMUSCULAR; INTRAVENOUS; SUBCUTANEOUS at 09:00

## 2019-06-02 RX ADMIN — ENALAPRILAT 1.25 MG: 1.25 INJECTION INTRAVENOUS at 15:39

## 2019-06-02 RX ADMIN — HYDROMORPHONE HYDROCHLORIDE 0.5 MG: 2 INJECTION, SOLUTION INTRAMUSCULAR; INTRAVENOUS; SUBCUTANEOUS at 00:43

## 2019-06-02 RX ADMIN — HYDROMORPHONE HYDROCHLORIDE 0.5 MG: 2 INJECTION, SOLUTION INTRAMUSCULAR; INTRAVENOUS; SUBCUTANEOUS at 20:14

## 2019-06-02 RX ADMIN — HYDROMORPHONE HYDROCHLORIDE 0.5 MG: 2 INJECTION, SOLUTION INTRAMUSCULAR; INTRAVENOUS; SUBCUTANEOUS at 03:51

## 2019-06-02 RX ADMIN — HYDROMORPHONE HYDROCHLORIDE 0.5 MG: 2 INJECTION, SOLUTION INTRAMUSCULAR; INTRAVENOUS; SUBCUTANEOUS at 14:05

## 2019-06-02 RX ADMIN — DIAZEPAM 5 MG: 5 INJECTION, SOLUTION INTRAMUSCULAR; INTRAVENOUS at 20:14

## 2019-06-02 ASSESSMENT — ENCOUNTER SYMPTOMS
VOMITING: 1
NAUSEA: 1
ABDOMINAL PAIN: 1

## 2019-06-02 NOTE — PROGRESS NOTES
"Hospital Medicine Daily Progress Note    Date of Service  6/2/2019    Chief Complaint  Nausea and vomiting.    Hospital Course   This is a 63-year-old female with a very complicated PMH including Crohn's with recurrent issues with ileostomy, psychiatric issues, esophageal dysphasia status post dilation on 5/22/2019 who presented to the hospital on 6/1/2019 with intractable nausea and vomiting.  CT scan showed no evidence of small bowel obstruction.       Interval Problem Update  -very difficult to get patient to stay on task regarding medical issues. She is paranoid \"big brother is listening in\" stating she had a microphone implanted in her room last time she was hospitalized. \"every time I come to this hospital it's all the same lies!\" She is very tangential and paranoid. Psych consult  -she reports her \"pain is from the top of my head to the tip of my toes\".  -she thinks she has a kidney infection. Reports only urine 1/2 cup urine past couple of days. UA and labs do not suggest this. She says she has a fever but keeps the temperature at 78 degrees \"because I'm in a Crohn's flare! Don't you know anything about Crohn's!?\"   -she reports episodes of vomiting and refuses any medications PO. RN reports no episodes of vomiting.    Consultants/Specialty  Psychiatry    Code Status  FULL    Disposition  TBD    Review of Systems  Review of Systems   Reason unable to perform ROS: Limited due to psychiatric disorder and inability to stay on task.   Gastrointestinal: Positive for abdominal pain, nausea and vomiting.   Musculoskeletal:        \"I have pain from the top of my head to the tips of my toes\"        Physical Exam  Temp:  [36.5 °C (97.7 °F)-37.6 °C (99.7 °F)] 37.6 °C (99.6 °F)  Pulse:  [] 105  Resp:  [16-20] 20  BP: (127-187)/(63-79) 171/72  SpO2:  [97 %-99 %] 98 %    Physical Exam   Constitutional: She is oriented to person, place, and time. Vital signs are normal. She appears well-developed and well-nourished. "   HENT:   Head: Normocephalic.   Eyes: Conjunctivae and EOM are normal. No scleral icterus.   Neck: Phonation normal.   Cardiovascular: Normal rate, normal heart sounds and intact distal pulses.    No edema   Pulmonary/Chest: Effort normal and breath sounds normal. She has no wheezes. She has no rhonchi.   Abdominal: Soft. She exhibits no distension.   Ostomy with scant amount of loose brown stool   Musculoskeletal: Normal range of motion.   LEÓN   Neurological: She is alert and oriented to person, place, and time. She has normal strength. GCS eye subscore is 4. GCS verbal subscore is 5. GCS motor subscore is 6.   Skin: Skin is warm and dry. No rash noted.   Psychiatric: Her affect is labile. Her speech is rapid and/or pressured and tangential. She is agitated. Thought content is paranoid and delusional. She expresses impulsivity.   Nursing note and vitals reviewed.      Fluids  No intake or output data in the 24 hours ending 06/02/19 1601    Laboratory  Recent Labs      06/02/19   0358   WBC  7.4   RBC  4.12*   HEMOGLOBIN  12.0   HEMATOCRIT  37.0   MCV  89.8   MCH  29.1   MCHC  32.4*   RDW  42.6   PLATELETCT  226   MPV  10.2     Recent Labs      06/01/19   1715  06/02/19   0358   SODIUM  133*  139   POTASSIUM  5.0  3.6   CHLORIDE  104  106   CO2  20  22   GLUCOSE  86  80   BUN  23*  17   CREATININE  1.16  0.99   CALCIUM  10.2  9.2                   Imaging  DX-CHEST-PORTABLE (1 VIEW)   Final Result         1. No acute cardiopulmonary abnormalities are identified.      CT-ABDOMEN-PELVIS W/O   Final Result         1. No evidence of acute inflammatory change.  The study is however limited due to nonuse of intravenous contrast.      2. No CT evidence of small bowel obstruction. Left lower quadrant ostomy.         US-EXTREMITY VENOUS LOWER BILAT   Final Result      DX-ABDOMEN COMPLETE WITH AP OR PA CXR   Final Result         1. No acute abnormality detected.           Assessment/Plan  Intractable cyclical vomiting with  nausea- (present on admission)   Assessment & Plan    -Acute on chronic  -no episodes witnessed this hospitalization. Patient reports she has had vomiting.  -continue anti-emetics       Anxiety disorder due to multiple medical problems- (present on admission)   Assessment & Plan    -Continue Valium     Crohn's disease of small intestine with complication (HCC)- (present on admission)   Assessment & Plan    -Chronic with ileostomy  -Followed by Dr. Cruz - surgery and GI consultants  -She reports feeling like she is in an acute flare  -She does not appear to be an acute flare  -CT shows no acute findings  -GI to see in am     Leg pain- (present on admission)   Assessment & Plan    -(-) DVT     Acute kidney injury (HCC)- (present on admission)   Assessment & Plan    -Due to dehydration  -Labs improving  -Continue IV fluids  -Repeat BMP in the morning     Hypertension- (present on admission)   Assessment & Plan    -Continue home metoprolol and amlodipine  -PRN enalapril       Hyponatremia- (present on admission)   Assessment & Plan    -Due to dehydration  -improving  -continue IVF  -Repeat BMP in the morning     COPD (chronic obstructive pulmonary disease) ([facility])- (present on admission)   Assessment & Plan    -Chronic without acute exacerbation     Ileostomy in place ([facility])- (present on admission)   Assessment & Plan    -very small amount stool noted in bag     Paroxysmal atrial fibrillation ([facility])- (present on admission)   Assessment & Plan    -continue metoprolol     Chronic pain- (present on admission)   Assessment & Plan    -She is not wanting to take her oral home medications.  She is asking for 2 mg IV Dilaudid  -History of drug-seeking behavior  -Reports pain from the top of her head to the tips of her toes  -She is asking that we call Dr. Telles, however since that Sunday no one is in the office.  We will attempt to contact him in the morning            VTE prophylaxis: Enoxaparin    Marya Zepeda,  MARCO

## 2019-06-02 NOTE — PROGRESS NOTES
Pt often with tangental thoughts and speech. When RN prompts question, pt will start to answer question, but go on tangent with a completely different topic. Pt is aware of her behavior. Pt can be redirected if you say her name, and state the subject which you want to speak about. Will continue to monitor.

## 2019-06-02 NOTE — ASSESSMENT & PLAN NOTE
-She is not wanting to take her oral home medications.  She is asking for 2 mg IV Dilaudid  -History of drug-seeking behavior  -Reports pain from the top of her head to the tips of her toes, associates it with migraine  -She follows Dr. Telles  -Psych trialed valproate, has not helped with her migraine  Liquid oxycodone PRN ordered   Trial ketamine today

## 2019-06-02 NOTE — THERAPY
Speech Language Therapy Clinical Swallow Evaluation completed.  Functional Status: Pt seen on this date for a clinical swallow evaluation. Pt with very poor attention and required max cues for redirections. Pt is aware of attention and requests that this clinician state her name and she will attend back to task. Pt reports a hx of dysphagia and stated that she is currently eating a purred diet at home with thin and nectar thick liquids. Of note, pt last seen by SLP on 5/24/19 and recommended for GI soft/thin liquid diet. No gross oral motor deficits appreciated. No overt s/sx of aspiration noted with NTL via cup sip, purees, thin liquids, and soft solids, however, pt c/o globus sensation with soft solids unable to clear with thin liquid sips and nausea. Pt completing effortful swallows and chin tuck for all PO trials, which she reports reduces pharyngeal residue. Of note, pt refusing thin liquids presented by this clinician and insisted trying her thickened electrolyte Smart Water recommended by her physician, however, water appears to be a thin consistency and pt reporting she did not add thickener to it, but that you buy it from the store thickened. Educated pt on thin consistency and she verbalized understanding. Intermittent belching noted with PO trials and pt reporting pills frequently get stuck and are unable to clear with liquid wash. At this time, would recommend that pt continue with pureed diet due to c/o globus with soft solids and thin liquids. Okay for thickened liquids per pt's request. Dysphagia education provided to pt and she verbalized understanding. SLP following for dysphagia therapy.    Recommendations - Diet:  Dysphagia I/thin liquid, okay for nectar thick liquids per pt request                          Strategies: No Straws and Head of Bed at 90 Degrees                          Medication Administration: crush in puree  Plan of Care: Will benefit from Speech Therapy 2 times per  "week  Post-Acute Therapy: Recommend outpatient or home health transitional care services for continued speech therapy services      See \"Rehab Therapy-Acute\" Patient Summary Report for complete documentation.   "

## 2019-06-02 NOTE — H&P
"Hospital Medicine History & Physical Note    Date of Service  6/1/2019    Primary Care Physician  Swati Cavanaugh M.D.    Consultants  None however the ER doctor did call surgery to clarify    Code Status  Full    Chief Complaint  Intractable nausea vomiting    History of Presenting Illness  63 y.o. female who presented 6/1/2019 with intractable nausea vomiting.  Patient is all over the place on questioning, it is difficult to get any information from her, when the questions asked some time she did not even answer it in a normal fashion.  I did my best, some the information is obtained from the ER physician as well as the chart.  She does have a history of Crohn's disease with an ostomy, has chronic pain and sees Dr. Telles for this.  Dr. mustafa has done her surgeries.  When she first came in she told the ER doctor that she was \"sent here for a revision\".  ER doctor did call surgery to clarify, Dr. mustafa is on vacation so he is not taking calls, surgery clarified that no surgery is needed at this time.  She describes her abdominal pain is being general, 10/10, located behind the stoma.  I am unsure of what type of pain it was because she never actually answer this question.  She does states she has had decreased urination, decreased oral intake.  She does have a history of DVT and thinks she has another in her legs.  I did discuss the case including labs and imaging with the ER physician.    Review of Systems  Review of Systems   Constitutional: Positive for malaise/fatigue. Negative for chills and fever.   HENT: Negative for congestion.    Respiratory: Negative for cough, sputum production, shortness of breath and stridor.    Cardiovascular: Negative for chest pain, palpitations and leg swelling.   Gastrointestinal: Positive for abdominal pain, nausea and vomiting. Negative for constipation and diarrhea.   Genitourinary: Negative for dysuria and urgency.   Musculoskeletal: Negative for falls and myalgias.        " Leg pain   Neurological: Negative for dizziness, tingling, loss of consciousness, weakness and headaches.   Psychiatric/Behavioral: Negative for depression and suicidal ideas.   All other systems reviewed and are negative.      Past Medical History   has a past medical history of Anesthesia; Anxiety; Arthritis; ASTHMA; Atrial fib/flut; Backpain; Breath shortness; Bronchitis; Chronic pain; Colostomy in place ([facility]) (10/14/2010); COPD (chronic obstructive pulmonary disease) ([facility]) (6/24/2014); COPD (chronic obstructive pulmonary disease) ([facility]) (6/24/2014); Crohn's disease of colon ([facility]); Dyspnea; History of cardiac murmur; Hypokalemia (6/24/2014); Indigestion; Infectious disease; Multiple falls (6/24/2014); Narcotic dependence ([facility]) (9/23/2009); Obstruction; Other specified disorder of intestines; Pain (7/11/13); Pneumonia; Postherpetic neuralgia (6/24/2014); Rosacea (6/24/2014); and Sleep apnea. She also has no past medical history of CAD (coronary artery disease); Liver disease; or Seizure disorder ([facility]).    Surgical History   has a past surgical history that includes lumbar fusion anterior; cervical disk and fusion anterior; foot surgery; hand surgery; other abdominal surgery; gastroscopy with biopsy (11/22/08); ileostomy (11/11/2009); dilation and curettage (9/24/2010); gastroscopy (10/4/2011); colonoscopy (10/4/2011); hardware removal neuro (7/16/2012); mammoplasty reduction (7/17/2013); ileostomy (5/14/2014); pr reduction of large breast; abdominal exploration; lumpectomy; colon resection; ileostomy (12/29/2018); sigmoidoscopy flex (12/29/2018); exploratory laparotomy (12/29/2018); gastroscopy (1/6/2019); and gastroscopy (N/A, 5/22/2019).     Family History  family history includes Cancer in her maternal aunt; Diabetes in her father and sister; Heart Disease in her father; Lung Disease in her mother.     Social History   reports that she has never smoked. She has never used smokeless tobacco. She reports that she  drinks alcohol. She reports that she uses drugs.    Allergies  Allergies   Allergen Reactions   • Azathioprine Sodium Hives   • Carafate [Sucralfate] Rash     Generalizes/Mouth Sores   • Infliximab Hives   • Morphine Swelling   • Roxanol [Morphine Sulfate] Swelling     Throat swells   • Amitriptyline Unspecified     Nightmares     • Demerol Vomiting   • Fentanyl Unspecified     Patches caused skin breakdown, no pain relief   • Lorazepam Unspecified     States give me nightmares.    • Methadone    • Methotrexate Hives and Rash   • Pregabalin Rash     Rash     • Pseudoephedrine Vomiting   • Sulfa Drugs Hives   • Tape Rash     Skin peels off; paper tape   • Celestone [Betamethasone Sodium Phosphate] Unspecified     Pt unable to remember   • Sulfasalazine Unspecified     Pt unable to remember   • Tizanidine Hydrochloride Unspecified     Pt unable to remember       Medications  Prior to Admission Medications   Prescriptions Last Dose Informant Patient Reported? Taking?   Aspirin 325 MG Tablet Delayed Response   Yes No   Sig: Take 1 Tab by mouth every day.   albuterol (VENTOLIN OR PROVENTIL) 108 (90 BASE) MCG/ACT Aero Soln  Patient Yes No   Sig: Inhale 2 Puffs by mouth every 6 hours as needed.   cetirizine (ZYRTEC) 10 MG Tab  Patient Yes No   Sig: Take 10 mg by mouth every day.   estradiol (ESTRACE) 0.1 MG/GM vaginal cream  Patient Yes No   Sig: Insert 0.5 g in vagina every 72 hours.   furosemide (LASIX) 20 MG Tab  Patient No No   Sig: Take 1 Tab by mouth every day.   granisetron (KYTRIL) 1 MG Tab  Patient No No   Sig: Take 1 Tab by mouth every 12 hours as needed for Nausea/Vomiting.   hydrALAZINE (APRESOLINE) 10 MG Tab   No No   Sig: TAKE 1 TABLET BY MOUTH THREE TIMES DAILY AS NEEDED FOR SYSTOLIC BLOOD PRESSURE>150   ipratropium-albuterol (DUONEB) 0.5-2.5 (3) MG/3ML nebulizer solution  Patient Yes No   Sig: 3 mL by Nebulization route 4 times a day as needed for Shortness of Breath.   metoprolol (LOPRESSOR) 25 MG Tab   Patient Yes No   Sig: Take 25 mg by mouth 2 times a day.   oxycodone 20 MG/ML CONC  Patient Yes No   Sig: Take 30 mg by mouth every four hours as needed. 20mg/ml solution   potassium bicarbonate (KLYTE) 25 MEQ tablet  Patient No No   Sig: Take 1 Tab by mouth every day.   prochlorperazine (COMPAZINE) 10 MG Tab   No No   Sig: Take 1 Tab by mouth every 6 hours as needed for Nausea/Vomiting.   spironolactone (ALDACTONE) 25 MG Tab   Yes No   Sig: TK 2 TS PO BID      Facility-Administered Medications: None       Physical Exam  Temp:  [37 °C (98.6 °F)] 37 °C (98.6 °F)  Pulse:  [109] 109  Resp:  [18] 18  BP: (155)/(84) 155/84  SpO2:  [95 %] 95 %    Physical Exam   Constitutional: She appears well-developed. She is cooperative. No distress.   HENT:   Head: Normocephalic and atraumatic. Not macrocephalic and not microcephalic. Head is without raccoon's eyes and without Salinas's sign.   Right Ear: External ear normal.   Left Ear: External ear normal.   Mouth/Throat: Mucous membranes are dry. No oropharyngeal exudate.   Eyes: Conjunctivae are normal. Right eye exhibits no discharge. Left eye exhibits no discharge. No scleral icterus.   Neck: Neck supple. No tracheal deviation present.   Cardiovascular: Normal rate, regular rhythm and intact distal pulses.  Exam reveals no gallop, no distant heart sounds and no friction rub.    No murmur heard.  Pulmonary/Chest: Effort normal. No accessory muscle usage or stridor. No tachypnea and no bradypnea. No respiratory distress. She has no decreased breath sounds. She has no wheezes. She has no rhonchi. She has no rales. She exhibits no tenderness.   Abdominal: Soft. Bowel sounds are normal. She exhibits no distension. There is no hepatosplenomegaly, splenomegaly or hepatomegaly. There is no tenderness. There is no rebound and no guarding.   Musculoskeletal: Normal range of motion. She exhibits no edema or tenderness.   Lymphadenopathy:     She has no cervical adenopathy.   Neurological:  She displays no seizure activity.   Awake, a bit manic    Skin: Skin is warm, dry and intact. No rash noted. She is not diaphoretic. No erythema. No pallor.   Psychiatric: Her mood appears anxious. Her speech is rapid and/or pressured. She is hyperactive.   Nursing note and vitals reviewed.      Laboratory:      Recent Labs      06/01/19   1715   SODIUM  133*   POTASSIUM  5.0   CHLORIDE  104   CO2  20   GLUCOSE  86   BUN  23*   CREATININE  1.16   CALCIUM  10.2     Recent Labs      06/01/19   1715   ALTSGPT  14   ASTSGOT  27   ALKPHOSPHAT  98   TBILIRUBIN  0.6   LIPASE  25   GLUCOSE  86                 No results for input(s): TROPONINI in the last 72 hours.    Urinalysis:    No results found     Imaging:  DX-ABDOMEN COMPLETE WITH AP OR PA CXR   Final Result         1. No acute abnormality detected.      US-EXTREMITY VENOUS LOWER BILAT    (Results Pending)         Assessment/Plan:  I anticipate this patient is appropriate for observation status at this time.    Intractable cyclical vomiting with nausea- (present on admission)   Assessment & Plan    -Acute on chronic  -Causing dehydration  -Give multiple medications for nausea  -I did personally review the chest x-ray and abdominal x-ray, no obstruction noted, no acute cardiopulmonary process     Anxiety disorder due to multiple medical problems- (present on admission)   Assessment & Plan    -Significant anxiety, it is difficult to get information from her  -She has an allergy to Ativan, takes Valium at home, resume     Crohn's disease of small intestine with complication (HCC)- (present on admission)   Assessment & Plan    -Chronic, I do not think she is having an acute flare     Leg pain- (present on admission)   Assessment & Plan    -She states it feels similar to previous DVTs, this is not supported on exam but obtain bilateral lower extremity ultrasound for completeness sake     Acute kidney injury (HCC)   Assessment & Plan    -Due to dehydration  -Start IV  fluids  -Repeat BMP in the morning     Hypertension- (present on admission)   Assessment & Plan    -Continue home metoprolol and amlodipine  -Place PRN enalapril  -Adjust as needed     Hyponatremia- (present on admission)   Assessment & Plan    -Due to dehydration  -Start IV fluids  -Repeat BMP in the morning     COPD (chronic obstructive pulmonary disease) (HCC)- (present on admission)   Assessment & Plan    -No acute exacerbation  -I did personally review the chest x-ray, she does have changes supporting COPD diagnosis     Ileostomy in place (HCC)- (present on admission)   Assessment & Plan    -With output but no bloody output     Paroxysmal atrial fibrillation (HCC)- (present on admission)   Assessment & Plan    -Currently in sinus rhythm on metoprolol, continue     Chronic pain- (present on admission)   Assessment & Plan    -Continue home dose of narcotic  -He does follow with a pain management physician  -She is requesting for Dr. Telles to be contacted but his office is currently closed         VTE prophylaxis: Lovenox

## 2019-06-02 NOTE — ASSESSMENT & PLAN NOTE
-Chronic with ileostomy  -Followed by Dr. Cruz - surgery and GI consultants  -She reports feeling like she is in an acute flare  -She does not appear to be an acute flare  -CT shows no acute findings  GI recommends outpatient follow-up

## 2019-06-02 NOTE — ASSESSMENT & PLAN NOTE
-Acute on chronic  -no episodes witnessed this hospitalization, tolerating her meals  -continue anti-emetics as needed

## 2019-06-02 NOTE — PROGRESS NOTES
Ostomy bag emptied. Patient off the floor via w/c with transporter and RNAdolfo. Belongings, chart and medication with patient.

## 2019-06-02 NOTE — PROGRESS NOTES
"Patient called this RN into room for 5th time, reports she's agitated. States she has not seen me for the past hour. Patient has multiple complaints: \"I need my Dilaudid changed to 2 mg instead of 0.5mg, and I might have to do my own pain management by having an orgasm, so make sure you knock before coming in\" (hospitalist and NP made aware), \"my food has not arrived\" (diet order recently, placed by speech), \"I can hear everything everyone is saying a mile away and that is HIPPA violation\" (apologized, ear plugs given and TV turned on to max volume per her request, door closed). All needs met at every encounter with patient. Patient appears more calm after letting her needs known. Patient apologized for her behavior after interaction with this RN. Will continue to monitor. Patient off the floor to CT at this time.  "

## 2019-06-02 NOTE — RESPIRATORY CARE
COPD EDUCATION by COPD CLINICAL EDUCATOR  6/2/2019 at 7:58 AM by Swati Nichols     Patient reviewed by COPD education team. Patient does not qualify for the COPD program.

## 2019-06-02 NOTE — CARE PLAN
Problem: Fluid Volume:  Goal: Will maintain balanced intake and output  Outcome: PROGRESSING AS EXPECTED  Pt here with dehydration. Pt received 1 L bolus of LR. Pt has been on NS @75mL/hr. Pt with improving renal function, per lab tests. Pt has not had any vomiting thus far tonight. Will monitor for changes.     Problem: Communication  Goal: The ability to communicate needs accurately and effectively will improve  Outcome: PROGRESSING SLOWER THAN EXPECTED  Pt is tangential by nature. Thus makes it difficult to educate pt regarding her hospital stay. Pt requires frequent reorienting. Will continue to monitor.

## 2019-06-02 NOTE — ED NOTES
Med Rec Updated and Complete per Pt and RX bottles at bedside (returned)  Allergies Reviewed  No PO ABX last 30 days.    Pt taking ASA 325mg daily.

## 2019-06-02 NOTE — PROGRESS NOTES
"Patient arrived on unit via wheelchair with transport at 1630.  Patient's roommate was ambulating to the bathroom and the alarm was going off, patient stated, \"I have a migraine and I can't deal with this if its going to go off all the time. I need a private room.  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  I am not going to get into that bed. I need a private room.\"  Patient informed that there are no private rooms available.  She became very agitated and eventually got into the bed, throwing her belongings everywhere. Security called to help set boundaries with the patient.  All apparent needs met at this time.  Bed in lowest, locked position; call light within reach.   "

## 2019-06-02 NOTE — ASSESSMENT & PLAN NOTE
With manic presentation  Psych following  Given dose of IV depacon x2 without much improvement  Continue Valium as needed

## 2019-06-03 ENCOUNTER — APPOINTMENT (OUTPATIENT)
Dept: RADIOLOGY | Facility: MEDICAL CENTER | Age: 64
DRG: 103 | End: 2019-06-03
Attending: INTERNAL MEDICINE
Payer: MEDICARE

## 2019-06-03 LAB
ANION GAP SERPL CALC-SCNC: 9 MMOL/L (ref 0–11.9)
BASOPHILS # BLD AUTO: 0.5 % (ref 0–1.8)
BASOPHILS # BLD: 0.04 K/UL (ref 0–0.12)
BUN SERPL-MCNC: 13 MG/DL (ref 8–22)
CALCIUM SERPL-MCNC: 10 MG/DL (ref 8.5–10.5)
CHLORIDE SERPL-SCNC: 104 MMOL/L (ref 96–112)
CO2 SERPL-SCNC: 24 MMOL/L (ref 20–33)
CREAT SERPL-MCNC: 0.89 MG/DL (ref 0.5–1.4)
EOSINOPHIL # BLD AUTO: 0.19 K/UL (ref 0–0.51)
EOSINOPHIL NFR BLD: 2.4 % (ref 0–6.9)
ERYTHROCYTE [DISTWIDTH] IN BLOOD BY AUTOMATED COUNT: 44.2 FL (ref 35.9–50)
GLUCOSE SERPL-MCNC: 90 MG/DL (ref 65–99)
HCT VFR BLD AUTO: 42.1 % (ref 37–47)
HGB BLD-MCNC: 13.6 G/DL (ref 12–16)
IMM GRANULOCYTES # BLD AUTO: 0.04 K/UL (ref 0–0.11)
IMM GRANULOCYTES NFR BLD AUTO: 0.5 % (ref 0–0.9)
LYMPHOCYTES # BLD AUTO: 1.43 K/UL (ref 1–4.8)
LYMPHOCYTES NFR BLD: 17.7 % (ref 22–41)
MCH RBC QN AUTO: 29.6 PG (ref 27–33)
MCHC RBC AUTO-ENTMCNC: 32.3 G/DL (ref 33.6–35)
MCV RBC AUTO: 91.7 FL (ref 81.4–97.8)
MONOCYTES # BLD AUTO: 0.73 K/UL (ref 0–0.85)
MONOCYTES NFR BLD AUTO: 9.1 % (ref 0–13.4)
NEUTROPHILS # BLD AUTO: 5.63 K/UL (ref 2–7.15)
NEUTROPHILS NFR BLD: 69.8 % (ref 44–72)
NRBC # BLD AUTO: 0 K/UL
NRBC BLD-RTO: 0 /100 WBC
PLATELET # BLD AUTO: 275 K/UL (ref 164–446)
PMV BLD AUTO: 10.3 FL (ref 9–12.9)
POTASSIUM SERPL-SCNC: 3.7 MMOL/L (ref 3.6–5.5)
RBC # BLD AUTO: 4.59 M/UL (ref 4.2–5.4)
SODIUM SERPL-SCNC: 137 MMOL/L (ref 135–145)
WBC # BLD AUTO: 8.1 K/UL (ref 4.8–10.8)

## 2019-06-03 PROCEDURE — 700111 HCHG RX REV CODE 636 W/ 250 OVERRIDE (IP): Performed by: INTERNAL MEDICINE

## 2019-06-03 PROCEDURE — 85025 COMPLETE CBC W/AUTO DIFF WBC: CPT

## 2019-06-03 PROCEDURE — 700111 HCHG RX REV CODE 636 W/ 250 OVERRIDE (IP): Performed by: PSYCHIATRY & NEUROLOGY

## 2019-06-03 PROCEDURE — 700105 HCHG RX REV CODE 258: Performed by: INTERNAL MEDICINE

## 2019-06-03 PROCEDURE — 700102 HCHG RX REV CODE 250 W/ 637 OVERRIDE(OP): Performed by: INTERNAL MEDICINE

## 2019-06-03 PROCEDURE — 700105 HCHG RX REV CODE 258: Performed by: PSYCHIATRY & NEUROLOGY

## 2019-06-03 PROCEDURE — 36415 COLL VENOUS BLD VENIPUNCTURE: CPT

## 2019-06-03 PROCEDURE — 770006 HCHG ROOM/CARE - MED/SURG/GYN SEMI*

## 2019-06-03 PROCEDURE — A9270 NON-COVERED ITEM OR SERVICE: HCPCS | Performed by: INTERNAL MEDICINE

## 2019-06-03 PROCEDURE — 99221 1ST HOSP IP/OBS SF/LOW 40: CPT | Performed by: PSYCHIATRY & NEUROLOGY

## 2019-06-03 PROCEDURE — 99232 SBSQ HOSP IP/OBS MODERATE 35: CPT | Performed by: HOSPITALIST

## 2019-06-03 PROCEDURE — 96376 TX/PRO/DX INJ SAME DRUG ADON: CPT

## 2019-06-03 PROCEDURE — 96372 THER/PROPH/DIAG INJ SC/IM: CPT

## 2019-06-03 PROCEDURE — 700111 HCHG RX REV CODE 636 W/ 250 OVERRIDE (IP): Performed by: HOSPITALIST

## 2019-06-03 PROCEDURE — 80048 BASIC METABOLIC PNL TOTAL CA: CPT

## 2019-06-03 RX ORDER — IMIPRAMINE HCL 25 MG
25 TABLET ORAL EVERY EVENING
Status: DISCONTINUED | OUTPATIENT
Start: 2019-06-03 | End: 2019-06-03

## 2019-06-03 RX ADMIN — DIAZEPAM 5 MG: 5 INJECTION, SOLUTION INTRAMUSCULAR; INTRAVENOUS at 02:24

## 2019-06-03 RX ADMIN — HYDROMORPHONE HYDROCHLORIDE 0.5 MG: 2 INJECTION, SOLUTION INTRAMUSCULAR; INTRAVENOUS; SUBCUTANEOUS at 17:13

## 2019-06-03 RX ADMIN — HYDROMORPHONE HYDROCHLORIDE 0.5 MG: 2 INJECTION, SOLUTION INTRAMUSCULAR; INTRAVENOUS; SUBCUTANEOUS at 21:21

## 2019-06-03 RX ADMIN — HYDROMORPHONE HYDROCHLORIDE 0.5 MG: 2 INJECTION, SOLUTION INTRAMUSCULAR; INTRAVENOUS; SUBCUTANEOUS at 01:12

## 2019-06-03 RX ADMIN — VALPROATE SODIUM 686 MG: 100 INJECTION, SOLUTION INTRAVENOUS at 23:31

## 2019-06-03 RX ADMIN — ENOXAPARIN SODIUM 40 MG: 100 INJECTION SUBCUTANEOUS at 06:02

## 2019-06-03 RX ADMIN — SODIUM CHLORIDE: 9 INJECTION, SOLUTION INTRAVENOUS at 21:20

## 2019-06-03 RX ADMIN — METOPROLOL TARTRATE 50 MG: 50 TABLET ORAL at 08:29

## 2019-06-03 RX ADMIN — AMLODIPINE BESYLATE 5 MG: 5 TABLET ORAL at 06:02

## 2019-06-03 RX ADMIN — HYDROMORPHONE HYDROCHLORIDE 0.5 MG: 2 INJECTION, SOLUTION INTRAMUSCULAR; INTRAVENOUS; SUBCUTANEOUS at 12:05

## 2019-06-03 RX ADMIN — ONDANSETRON 4 MG: 2 INJECTION INTRAMUSCULAR; INTRAVENOUS at 21:21

## 2019-06-03 RX ADMIN — ONDANSETRON 4 MG: 2 INJECTION INTRAMUSCULAR; INTRAVENOUS at 01:12

## 2019-06-03 RX ADMIN — HYDROMORPHONE HYDROCHLORIDE 0.5 MG: 2 INJECTION, SOLUTION INTRAMUSCULAR; INTRAVENOUS; SUBCUTANEOUS at 06:15

## 2019-06-03 RX ADMIN — ONDANSETRON 4 MG: 2 INJECTION INTRAMUSCULAR; INTRAVENOUS at 06:15

## 2019-06-03 RX ADMIN — DIAZEPAM 5 MG: 5 INJECTION, SOLUTION INTRAMUSCULAR; INTRAVENOUS at 15:17

## 2019-06-03 ASSESSMENT — ENCOUNTER SYMPTOMS
PALPITATIONS: 0
VOMITING: 1
ABDOMINAL PAIN: 0
NAUSEA: 1
CHILLS: 0
HEADACHES: 1
FEVER: 0
SHORTNESS OF BREATH: 0
DIZZINESS: 0
COUGH: 0

## 2019-06-03 NOTE — CARE PLAN
Problem: Safety  Goal: Will remain free from falls    Intervention: Assess risk factors for falls  Fall measures in place. Call light within reach, personal belongings close-by, bed in lowest position, IV pole on same side of bathroom, upper bed rails up, hourly rounding in place. Will continue to monitor pt for safety.       Problem: Pain Management  Goal: Pain level will decrease to patient's comfort goal    Intervention: Follow pain managment plan developed in collaboration with patient and Interdisciplinary Team  Pt medicated for pain per mar, hourly rounding in place.

## 2019-06-03 NOTE — PROGRESS NOTES
Pt talking to self upon entering room, w/ sunglasses and visor on, states that the beeping sounds are giving her a migraine, pt is AAOx4, very irritable and anxious refusing to have skin assessed, pt has ileostomy that she can care for self, medicated for pain and anxiety per mar, discussed poc w/ pt. Fall measures in place. Call light within reach, personal belongings close-by, bed in lowest position, IV pole on same side of bathroom, upper bed rails up, hourly rounding in place. Will continue to monitor pt for safety.

## 2019-06-03 NOTE — PROGRESS NOTES
"Hospital Medicine Daily Progress Note    Date of Service  6/3/2019    Chief Complaint  Nausea and vomiting.    Hospital Course   This is a 63-year-old female with a very complicated PMHx Crohn's with recurrent issues with ileostomy, psychiatric issues, esophageal dysphasia status post dilation on 5/22/2019 who presented to the hospital on 6/1/2019 with intractable nausea and vomiting.  CT scan showed no evidence of small bowel obstruction.       Interval Problem Update  Very irritable overnight, doing better this AM   Pending psych and GI eval  Vomiting is slightly better  She has not had her breakfast yet   She is requesting to be ambulated and to receive some chocolate magic cups with meals     Consultants/Specialty  Psychiatry  Gastroenterology     Code Status  FULL    Disposition  TBD    Review of Systems  Review of Systems   Constitutional: Negative for chills and fever.   Respiratory: Negative for cough and shortness of breath.    Cardiovascular: Negative for chest pain and palpitations.   Gastrointestinal: Positive for nausea and vomiting. Negative for abdominal pain.   Genitourinary: Negative for dysuria and urgency.   Musculoskeletal:        \"everything is sensitive\"   Neurological: Positive for headaches. Negative for dizziness.        Physical Exam  Temp:  [36.3 °C (97.4 °F)-36.6 °C (97.9 °F)] 36.3 °C (97.4 °F)  Pulse:  [68-77] 76  Resp:  [17-18] 17  BP: (113-132)/(51-72) 125/69  SpO2:  [93 %-100 %] 99 %    Physical Exam   Constitutional: She is oriented to person, place, and time. Vital signs are normal. She appears well-developed and well-nourished.   HENT:   Head: Normocephalic.   Eyes: Conjunctivae and EOM are normal. No scleral icterus.   Neck: Phonation normal.   Cardiovascular: Normal rate, normal heart sounds and intact distal pulses.    No edema   Pulmonary/Chest: Effort normal and breath sounds normal. She has no wheezes. She has no rhonchi.   Abdominal: Soft. She exhibits no distension. "   Ostomy with scant amount of loose brown stool   Musculoskeletal: Normal range of motion.   LEÓN   Neurological: She is alert and oriented to person, place, and time. She has normal strength.   Skin: Skin is warm and dry. No rash noted.   Psychiatric: Her affect is labile. Her speech is rapid and/or pressured and tangential. She is agitated. Thought content is paranoid and delusional. She expresses impulsivity.   Nursing note and vitals reviewed.      Fluids    Intake/Output Summary (Last 24 hours) at 06/03/19 2012  Last data filed at 06/03/19 1300   Gross per 24 hour   Intake              480 ml   Output                0 ml   Net              480 ml       Laboratory  Recent Labs      06/02/19   0358  06/03/19   0058   WBC  7.4  8.1   RBC  4.12*  4.59   HEMOGLOBIN  12.0  13.6   HEMATOCRIT  37.0  42.1   MCV  89.8  91.7   MCH  29.1  29.6   MCHC  32.4*  32.3*   RDW  42.6  44.2   PLATELETCT  226  275   MPV  10.2  10.3     Recent Labs      06/01/19   1715  06/02/19 0358  06/03/19   0058   SODIUM  133*  139  137   POTASSIUM  5.0  3.6  3.7   CHLORIDE  104  106  104   CO2  20  22  24   GLUCOSE  86  80  90   BUN  23*  17  13   CREATININE  1.16  0.99  0.89   CALCIUM  10.2  9.2  10.0                   Imaging  IR-US GUIDED PIV   Final Result    Ultrasound-guided PERIPHERAL IV INSERTION performed by    qualified nursing staff as above.            DX-CHEST-PORTABLE (1 VIEW)   Final Result         1. No acute cardiopulmonary abnormalities are identified.      CT-ABDOMEN-PELVIS W/O   Final Result         1. No evidence of acute inflammatory change.  The study is however limited due to nonuse of intravenous contrast.      2. No CT evidence of small bowel obstruction. Left lower quadrant ostomy.         US-EXTREMITY VENOUS LOWER BILAT   Final Result      DX-ABDOMEN COMPLETE WITH AP OR PA CXR   Final Result         1. No acute abnormality detected.           Assessment/Plan  Intractable cyclical vomiting with nausea- (present on  admission)   Assessment & Plan    -Acute on chronic  -no episodes witnessed this hospitalization. Patient reports she has had vomiting.  -continue anti-emetics       Anxiety disorder due to multiple medical problems- (present on admission)   Assessment & Plan    -Continue Valium     Crohn's disease of small intestine with complication (HCC)- (present on admission)   Assessment & Plan    -Chronic with ileostomy  -Followed by Dr. Cruz - surgery and GI consultants  -She reports feeling like she is in an acute flare  -She does not appear to be an acute flare  -CT shows no acute findings  -GI eval pending     Leg pain- (present on admission)   Assessment & Plan    -(-) DVT     Acute kidney injury (HCC)- (present on admission)   Assessment & Plan    -Due to dehydration  -Labs improving  -Continue IV fluids  -Repeat BMP in the morning     Hypertension- (present on admission)   Assessment & Plan    -Continue home metoprolol and amlodipine  -PRN enalapril       Hyponatremia- (present on admission)   Assessment & Plan    -Due to dehydration  resolved     COPD (chronic obstructive pulmonary disease) ([facility])- (present on admission)   Assessment & Plan    -Chronic without acute exacerbation     Ileostomy in place ([facility])- (present on admission)   Assessment & Plan    -very small amount stool noted in bag     Paroxysmal atrial fibrillation (HCC)- (present on admission)   Assessment & Plan    -continue metoprolol     Chronic pain- (present on admission)   Assessment & Plan    -She is not wanting to take her oral home medications.  She is asking for 2 mg IV Dilaudid  -History of drug-seeking behavior  -Reports pain from the top of her head to the tips of her toes  -She follows Dr. Telles            VTE prophylaxis: Enoxaparin

## 2019-06-03 NOTE — CARE PLAN
Problem: Safety  Goal: Will remain free from injury    Intervention: Provide assistance with mobility  Pt is considered a moderate fall risk. All fall precautions in place. Call light within reach. Hourly rounding in place. Ambulating pt in hallways.       Problem: Pain Management  Goal: Pain level will decrease to patient's comfort goal    Intervention: Follow pain managment plan developed in collaboration with patient and Interdisciplinary Team  PRN medication. Proving pt with non pharmaceutical pain management. Heat packs given. Repositioning. Pending PIV placement. Pt refusing oral medication due to pain.

## 2019-06-03 NOTE — PROGRESS NOTES
Assumed care of pt at 0700. Report received and bedside rounding completed with night RN. No SOB, or in any acute distress noted.    Pt is considered a moderate fall risk. edu provided on risk level.   Fall precautions in place,  bed alarm. - Treaded non slip socks. Bed locked. Communication board updated with POC. Call light and pt belongings within reach - pt makes needs known - hourly rounding in place. See flowsheets for further assessment.

## 2019-06-03 NOTE — PSYCHIATRY
"PSYCHIATRIC CONSULTATION:  Reason for admission:   Intractable nausea vomiting  Reason for consult:psychosis   Requesting Physician: Dr. German  Supervising Physician Dr. Rivera          Legal status:  voluntary    Chief Complaint:   Chief Complaint   Patient presents with   • Migraine   • Pain     \"from head to toe, I'm in a Crohn's flare also\"       HPI:     Pt is a 63 y.o female w/ complicated PMH of Crohn's dz, multiple abdominal surgeries, and chronic pain. Pt was admitted for dehydration, decreased PO intake, nausea/vomiting, and pain control. Patient endorses difficulty with sleep since hospitalization.  Pt states that she originally came in because her insurance instructed her to do so; has a billing paper which indicates they approve for inpatient treatment. She states she is having a Crohn's flare and thus her pain is worse currently \"from the tip of her toes to the top of her head\". She reports having a migraine and thus she needs to wear a visor and sunglasses. She also feels the need to wear earplugs because she is able to hear multiple conversations \"up to a quarter mile away\" including 8 current conversations in adjacent rooms.   She was recently seen here by Dr. Rivera on January 2019 and was recommended to start on Imipramine which she did not take. She states that she often reads medication package inserts thoroughly and refuses to take medications that will give her side effects. She mentions that she prefers everything she takes to be organic. She is, however, open to discussing taking Imipramine during her stay here stating \"things have changed, so I'm open to it\". She requested to read the package insert before agreeing to take this medication. Pt reports irritability, anxiety, fatigue, and states she has not slept last night, but denies any other sx of kely, impulsivity, ritualistic behavior, A/V hallucinations, SI, HI, or paranoia. Admits to vaping cannabis, but states she last smoked " several weeks ago, and stopped due to learning of potential for hyperemesis syndrome. Denies any other substance use or alcohol.       Psychiatric Review of Systems:current symptoms as reported by pt.  Depression:      Denies depression and SI  Sonal: denies hx of sonal; reports no sleep last night (reports associated fatigue)  Anxiety/Panic Attacks endorses anxiety about many things including her   PTSD symptom: denies hx of abuse/trauma  Psychosis: denies a/v hallucinations, paranoia, delusions,   Other: increased ability to hear; rituals, rigid thought, compulsions       Medical Review of Systems: as reported by pt. All systems reviewed. Only those found to be + are noted below. All others are negative. Endorses HA, sensitivity to light and sound  Neurological:    TBIs: denies   SZs: denies   Strokes: denies   Other:  Other medical symptoms:   Thyroid: denies   Diabetes: denies   Cardiovascular disease: denies    Psychiatric Examination: observed phenomenon:  Vitals:   Vitals:    06/03/19 0815   BP: 132/72   Pulse: 77   Resp: 18   Temp: 36.6 °C (97.9 °F)   SpO2: 100%   Appearance: well-groomed, wearing visor, sunglasses, earplugs, and gloves. Appropriate hygiene, appears stated age  Behavior: normal psychomotor activity; no tic/tremor/stereotyped behavior; appropriate eye contact, generally cooperative   Gait/Station: reclined on bed   Speech: rapid rate, normal tone/volume/inflection; increased spontaneous speech; able to be interrupted and redirected  Thought Process: circumferential and circumstantial, coherent, no loosening of association  Abnormal/Psychotic Thoughts (ex): denies AH/VH ; not seen to respond to internal stimuli  Insight/Judgement: fair/fair  Orientation: A&Ox4   Memory: intact to interview  Attention/Concentration: intact to interview  Fund of Knowledge: average   Mood: anxious           Affect: full and congruent to mood         SI/HI: denies SI/HI  Neuro: deferred  Past Psychiatric  Hx:   Outpatient: sees a counselor denies psychiatrist;  Inpatient: denies  Past Rx: denies psychotropics  Current Outpatient Rx: no psychotropics  Family Psychiatric Hx:  Denies family hx of psychosis, bp, dep/anx, suicide, HOSSEIN  Social Hx:  Lives in house with , rarely leaves home  retired    Drug/Alcohol/Tobacco Hx:   Drugs: denies all    Alcohol: denies routine   Tobacco: denies      Medical Hx: labs, MARS, medications, etc were reviewed. Only those findings of potential interest to psychiatry are noted below:  Medical Conditions:     Past Medical History:   Diagnosis Date   • Anesthesia     difficult Iv stick   • Anxiety    • Arthritis     all over   • ASTHMA    • Atrial fib/flut    • Backpain    • Breath shortness    • Bronchitis     5 years   • Chronic pain    • Colostomy in place ([facility]) 10/14/2010   • COPD (chronic obstructive pulmonary disease) ([facility]) 6/24/2014   • COPD (chronic obstructive pulmonary disease) ([facility]) 6/24/2014   • Crohn's disease of colon ([facility])    • Dyspnea    • History of cardiac murmur    • Hypokalemia 6/24/2014   • Indigestion    • Infectious disease     MRSA, VancoRSA   • Multiple falls 6/24/2014   • Narcotic dependence ([facility]) 9/23/2009   • Obstruction    • Other specified disorder of intestines     crohns disease   • Pain 7/11/13    all over   • Pneumonia     child and mid 30's   • Postherpetic neuralgia 6/24/2014   • Rosacea 6/24/2014   • Sleep apnea      Allergies: Azathioprine sodium; Carafate [sucralfate]; Infliximab; Morphine; Roxanol [morphine sulfate]; Amitriptyline; Demerol; Fentanyl; Lorazepam; Methadone; Methotrexate; Pregabalin; Pseudoephedrine; Sulfa drugs; Tape; Lyrica; Promethazine; Celestone [betamethasone sodium phosphate]; Sulfasalazine; and Tizanidine hydrochloride  Medications (Mar48):   Medications Administered in Last 48 Hours from 06/01/2019 1622 to 06/03/2019 1622     Date/Time Order Dose Route Action Comments    06/01/2019 2200 LR infusion (bolus) 0 mL  "Intravenous Stopped     06/01/2019 1916 LR infusion (bolus) 1,000 mL Intravenous New Bag     06/01/2019 1729 ondansetron (ZOFRAN) syringe/vial injection 4 mg 4 mg Intramuscular Given     06/01/2019 1728 HYDROmorphone pf (DILAUDID) injection 1 mg 1 mg Intramuscular Given     06/01/2019 1916 HYDROmorphone pf (DILAUDID) injection 1 mg 1 mg Intravenous Given     06/01/2019 1916 ondansetron (ZOFRAN) syringe/vial injection 4 mg 4 mg Intravenous Given     06/02/2019 0615 aspirin EC (ECOTRIN) tablet 325 mg 0 mg Oral Held     06/03/2019 0829 metoprolol (LOPRESSOR) tablet 50 mg 50 mg Oral Given     06/02/2019 2014 metoprolol (LOPRESSOR) tablet 50 mg 50 mg Oral Given     06/02/2019 0845 metoprolol (LOPRESSOR) tablet 50 mg 50 mg Oral Refused \"im just going to puke it up\"    06/01/2019 2133 metoprolol (LOPRESSOR) tablet 50 mg 25 mg Oral Refused     06/03/2019 0602 senna-docusate (PERICOLACE or SENOKOT S) 8.6-50 MG per tablet 2 Tab 2 Tab Oral Refused     06/02/2019 2015 senna-docusate (PERICOLACE or SENOKOT S) 8.6-50 MG per tablet 2 Tab 2 Tab Oral Refused     06/02/2019 0615 senna-docusate (PERICOLACE or SENOKOT S) 8.6-50 MG per tablet 2 Tab 0 Tab Oral Held     06/01/2019 2133 senna-docusate (PERICOLACE or SENOKOT S) 8.6-50 MG per tablet 2 Tab 2 Tab Oral Refused     06/02/2019 1723 NS infusion   Intravenous New Bag     06/02/2019 1624 NS infusion   Intravenous Rate Change not Required cont to S527-2    06/01/2019 2209 NS infusion   Intravenous New Bag     06/03/2019 0602 enoxaparin (LOVENOX) inj 40 mg 40 mg Subcutaneous Given     06/02/2019 0639 enoxaparin (LOVENOX) inj 40 mg 40 mg Subcutaneous Given     06/02/2019 1539 enalaprilat (VASOTEC) injection 1.25 mg 1.25 mg Intravenous Given     06/03/2019 0615 ondansetron (ZOFRAN) syringe/vial injection 4 mg 4 mg Intravenous Given     06/03/2019 0112 ondansetron (ZOFRAN) syringe/vial injection 4 mg 4 mg Intravenous Given     06/03/2019 0602 amLODIPine (NORVASC) tablet 5 mg 5 mg Oral " Given     06/02/2019 0615 amLODIPine (NORVASC) tablet 5 mg 0 mg Oral Held     06/03/2019 1201 diazePAM (VALIUM) tablet 2.5-5 mg 2.5 mg Oral Refused medication returnedt to ADS. pt refusing unable to swallow.    06/03/2019 1517 diazePAM (VALIUM) injection 5 mg 5 mg Intravenous Given     06/03/2019 0224 diazePAM (VALIUM) injection 5 mg 5 mg Intravenous Given     06/02/2019 2014 diazePAM (VALIUM) injection 5 mg 5 mg Intravenous Given     06/02/2019 1406 diazePAM (VALIUM) injection 5 mg 5 mg Intravenous Given     06/02/2019 0639 diazePAM (VALIUM) injection 5 mg 5 mg Intravenous Given     06/01/2019 2333 diazePAM (VALIUM) injection 5 mg 5 mg Intravenous Given     06/03/2019 1205 HYDROmorphone (DILAUDID) injection 0.5 mg 0.5 mg Intravenous Given     06/03/2019 0615 HYDROmorphone (DILAUDID) injection 0.5 mg 0.5 mg Intravenous Given     06/03/2019 0112 HYDROmorphone (DILAUDID) injection 0.5 mg 0.5 mg Intravenous Given     06/02/2019 2014 HYDROmorphone (DILAUDID) injection 0.5 mg 0.5 mg Intravenous Given     06/02/2019 1706 HYDROmorphone (DILAUDID) injection 0.5 mg 0.5 mg Intravenous Given     06/02/2019 1405 HYDROmorphone (DILAUDID) injection 0.5 mg 0.5 mg Intravenous Given     06/02/2019 1347 HYDROmorphone (DILAUDID) injection 0.5 mg 0.5 mg Intravenous Return to ADS     06/02/2019 0900 HYDROmorphone (DILAUDID) injection 0.5 mg 0.5 mg Intravenous Given     06/02/2019 0351 HYDROmorphone (DILAUDID) injection 0.5 mg 0.5 mg Intravenous Given     06/02/2019 0043 HYDROmorphone (DILAUDID) injection 0.5 mg 0.5 mg Intravenous Given         Labs:     Recent Results (from the past 72 hour(s))   COMP METABOLIC PANEL    Collection Time: 06/01/19  5:15 PM   Result Value Ref Range    Sodium 133 (L) 135 - 145 mmol/L    Potassium 5.0 3.6 - 5.5 mmol/L    Chloride 104 96 - 112 mmol/L    Co2 20 20 - 33 mmol/L    Anion Gap 9.0 0.0 - 11.9    Glucose 86 65 - 99 mg/dL    Bun 23 (H) 8 - 22 mg/dL    Creatinine 1.16 0.50 - 1.40 mg/dL    Calcium 10.2  8.5 - 10.5 mg/dL    AST(SGOT) 27 12 - 45 U/L    ALT(SGPT) 14 2 - 50 U/L    Alkaline Phosphatase 98 30 - 99 U/L    Total Bilirubin 0.6 0.1 - 1.5 mg/dL    Albumin 4.5 3.2 - 4.9 g/dL    Total Protein 7.9 6.0 - 8.2 g/dL    Globulin 3.4 1.9 - 3.5 g/dL    A-G Ratio 1.3 g/dL   LIPASE    Collection Time: 06/01/19  5:15 PM   Result Value Ref Range    Lipase 25 11 - 82 U/L   ESTIMATED GFR    Collection Time: 06/01/19  5:15 PM   Result Value Ref Range    GFR If  57 (A) >60 mL/min/1.73 m 2    GFR If Non  47 (A) >60 mL/min/1.73 m 2   CBC without Differential    Collection Time: 06/02/19  3:58 AM   Result Value Ref Range    WBC 7.4 4.8 - 10.8 K/uL    RBC 4.12 (L) 4.20 - 5.40 M/uL    Hemoglobin 12.0 12.0 - 16.0 g/dL    Hematocrit 37.0 37.0 - 47.0 %    MCV 89.8 81.4 - 97.8 fL    MCH 29.1 27.0 - 33.0 pg    MCHC 32.4 (L) 33.6 - 35.0 g/dL    RDW 42.6 35.9 - 50.0 fL    Platelet Count 226 164 - 446 K/uL    MPV 10.2 9.0 - 12.9 fL   Basic Metabolic Panel (BMP)    Collection Time: 06/02/19  3:58 AM   Result Value Ref Range    Sodium 139 135 - 145 mmol/L    Potassium 3.6 3.6 - 5.5 mmol/L    Chloride 106 96 - 112 mmol/L    Co2 22 20 - 33 mmol/L    Glucose 80 65 - 99 mg/dL    Bun 17 8 - 22 mg/dL    Creatinine 0.99 0.50 - 1.40 mg/dL    Calcium 9.2 8.5 - 10.5 mg/dL    Anion Gap 11.0 0.0 - 11.9   ESTIMATED GFR    Collection Time: 06/02/19  3:58 AM   Result Value Ref Range    GFR If African American >60 >60 mL/min/1.73 m 2    GFR If Non  56 (A) >60 mL/min/1.73 m 2   URINALYSIS    Collection Time: 06/02/19 10:05 PM   Result Value Ref Range    Color Yellow     Character Clear     Specific Gravity 1.023 <1.035    Ph 5.0 5.0 - 8.0    Glucose Negative Negative mg/dL    Ketones 15 (A) Negative mg/dL    Protein Negative Negative mg/dL    Bilirubin Negative Negative    Urobilinogen, Urine 0.2 Negative    Nitrite Negative Negative    Leukocyte Esterase Trace (A) Negative    Occult Blood Negative Negative     Micro Urine Req Microscopic    URINE MICROSCOPIC (W/UA)    Collection Time: 06/02/19 10:05 PM   Result Value Ref Range    WBC 5-10 (A) /hpf    RBC 2-5 (A) /hpf    Bacteria Negative None /hpf    Epithelial Cells Negative /hpf    Hyaline Cast 6-10 (A) /lpf   CBC WITH DIFFERENTIAL    Collection Time: 06/03/19 12:58 AM   Result Value Ref Range    WBC 8.1 4.8 - 10.8 K/uL    RBC 4.59 4.20 - 5.40 M/uL    Hemoglobin 13.6 12.0 - 16.0 g/dL    Hematocrit 42.1 37.0 - 47.0 %    MCV 91.7 81.4 - 97.8 fL    MCH 29.6 27.0 - 33.0 pg    MCHC 32.3 (L) 33.6 - 35.0 g/dL    RDW 44.2 35.9 - 50.0 fL    Platelet Count 275 164 - 446 K/uL    MPV 10.3 9.0 - 12.9 fL    Neutrophils-Polys 69.80 44.00 - 72.00 %    Lymphocytes 17.70 (L) 22.00 - 41.00 %    Monocytes 9.10 0.00 - 13.40 %    Eosinophils 2.40 0.00 - 6.90 %    Basophils 0.50 0.00 - 1.80 %    Immature Granulocytes 0.50 0.00 - 0.90 %    Nucleated RBC 0.00 /100 WBC    Neutrophils (Absolute) 5.63 2.00 - 7.15 K/uL    Lymphs (Absolute) 1.43 1.00 - 4.80 K/uL    Monos (Absolute) 0.73 0.00 - 0.85 K/uL    Eos (Absolute) 0.19 0.00 - 0.51 K/uL    Baso (Absolute) 0.04 0.00 - 0.12 K/uL    Immature Granulocytes (abs) 0.04 0.00 - 0.11 K/uL    NRBC (Absolute) 0.00 K/uL   Basic Metabolic Panel    Collection Time: 06/03/19 12:58 AM   Result Value Ref Range    Sodium 137 135 - 145 mmol/L    Potassium 3.7 3.6 - 5.5 mmol/L    Chloride 104 96 - 112 mmol/L    Co2 24 20 - 33 mmol/L    Glucose 90 65 - 99 mg/dL    Bun 13 8 - 22 mg/dL    Creatinine 0.89 0.50 - 1.40 mg/dL    Calcium 10.0 8.5 - 10.5 mg/dL    Anion Gap 9.0 0.0 - 11.9   ESTIMATED GFR    Collection Time: 06/03/19 12:58 AM   Result Value Ref Range    GFR If African American >60 >60 mL/min/1.73 m 2    GFR If Non African American >60 >60 mL/min/1.73 m 2       ECG: QTc=     Cranial Imaging: no head imaging in 12 months        ASSESSMENT:     OCPD  Unspecified anxiety disorder    63 year old white female who meets criteria for OCPD with a pervasive pattern  for rules, shows perfectionism with tasks,  Over conscientious and inflexible about rules like following all the guidelines on medication which is why she hasn't driven in 22 years as her medications indicate she should use caution with driving, is very rigid in thought and behaviors. She has an ostomy due to Chron's.     Suicide Risk Evaluation:  Chronic: elevated due to chronic health and psychiatric illness  Acute: low, patient denies SI    PLAN:  imipramine 25 mg PO HS for OCPD  Legal status: voluntary  Anticipate Follow up  YES  .   Labs/tests ordered:NO   Records reviewed:YES    Discussed patient with other provider:    Dr. Rivera        Thank you for the consult.

## 2019-06-03 NOTE — DIETARY
Nutrition services: Day 0 of admit.  Jana Overton is a 63 y.o. female with admitting DX of intractable n/v.   Pt noted with poor PO intake and wt loss on nutrition admit screen.  Pt appears thin but nourished.  RD visited pt at bedside to discuss appetite and wt hx.  Pt reports not feeling well, then all over the place answering questions.  RD to ensure Nutrition Representative visits pt for meal/snack preferences.  RD to follow-up as appropriate and will continue to monitor.    Assessment:  Height: 182.9 cm (6')  Weight: 68.6 kg (151 lb 3.8 oz) - via stand up scale.   Body mass index is 20.51 kg/m²., BMI classification: WNL.   Diet/Intake: Regular, dysphagia 1, thin liquid.  Per chart, pt consumed % x 1 meal documented thus far.     Evaluation:   1. Pt noted with anxiety disorder d/t multiple medical problems, Crohn's disease of small intestine with complication, FLORES, HTN, hyponatremia, COPD, ileostomy in place, and chronic pain.  2. No pertinent information obtained at RD encounter.  3. Per H&P, pt presented with intractable n/v - likely resulting in poor PO intake, possibly some wt loss.  4. Per MD note from 6/1, abdominal x-ray showed no obstruction.   5. Pt recently admitted 5/19 and discharged 5/24.  Pt underwent esophageal dilation on 5/22/19.   6. Per chart review, pt weighed 80 kg via bed scale on 12/12/18, 69 kg via stand up scale on 5/19/19, and wt upon current admit is 68.6 kg via stand up scale.  Wt appears stable over the past 2 weeks however pt noted with a 14.3% wt loss over the past 5.5 months, which is considered severe.  Although, question accuracy of bed scale wts.    7. Meds: Senokot, NS infusion @ 75 mL/hr.  Other meds and labs reviewed at this time.   8. Last BM: today (6/3).    Malnutrition Risk: Pt does not meet ASPEN Guideline criteria at this time.     Recommendations/Plan:  1. Encourage intake of meals.  2. Document intake of all meals as % taken in ADL's to provide  interdisciplinary communication across all shifts.   3. Monitor weight.  4. Nutrition rep will continue to see patient for ongoing meal and snack preferences.   5. RD to obtain supplement order as needed.    RD following.

## 2019-06-04 PROBLEM — E87.1 HYPONATREMIA: Status: RESOLVED | Noted: 2018-12-12 | Resolved: 2019-06-04

## 2019-06-04 PROBLEM — G43.909 MIGRAINE: Status: ACTIVE | Noted: 2019-06-04

## 2019-06-04 PROCEDURE — 99233 SBSQ HOSP IP/OBS HIGH 50: CPT | Performed by: INTERNAL MEDICINE

## 2019-06-04 PROCEDURE — 770006 HCHG ROOM/CARE - MED/SURG/GYN SEMI*

## 2019-06-04 PROCEDURE — 700105 HCHG RX REV CODE 258: Performed by: INTERNAL MEDICINE

## 2019-06-04 PROCEDURE — 700111 HCHG RX REV CODE 636 W/ 250 OVERRIDE (IP): Performed by: INTERNAL MEDICINE

## 2019-06-04 PROCEDURE — 700111 HCHG RX REV CODE 636 W/ 250 OVERRIDE (IP): Performed by: HOSPITALIST

## 2019-06-04 RX ORDER — ASPIRIN 81 MG/1
81 TABLET, CHEWABLE ORAL DAILY
Status: DISCONTINUED | OUTPATIENT
Start: 2019-06-05 | End: 2019-06-05

## 2019-06-04 RX ORDER — SPIRONOLACTONE 25 MG/1
50 TABLET ORAL 2 TIMES DAILY
Status: DISCONTINUED | OUTPATIENT
Start: 2019-06-05 | End: 2019-06-05

## 2019-06-04 RX ORDER — KETOROLAC TROMETHAMINE 30 MG/ML
15 INJECTION, SOLUTION INTRAMUSCULAR; INTRAVENOUS EVERY 6 HOURS PRN
Status: DISCONTINUED | OUTPATIENT
Start: 2019-06-04 | End: 2019-06-08 | Stop reason: HOSPADM

## 2019-06-04 RX ADMIN — KETOROLAC TROMETHAMINE 15 MG: 30 INJECTION, SOLUTION INTRAMUSCULAR; INTRAVENOUS at 15:56

## 2019-06-04 RX ADMIN — DIAZEPAM 5 MG: 5 INJECTION, SOLUTION INTRAMUSCULAR; INTRAVENOUS at 03:39

## 2019-06-04 RX ADMIN — HYDROMORPHONE HYDROCHLORIDE 0.5 MG: 2 INJECTION, SOLUTION INTRAMUSCULAR; INTRAVENOUS; SUBCUTANEOUS at 15:55

## 2019-06-04 RX ADMIN — HYDROMORPHONE HYDROCHLORIDE 0.5 MG: 2 INJECTION, SOLUTION INTRAMUSCULAR; INTRAVENOUS; SUBCUTANEOUS at 03:38

## 2019-06-04 RX ADMIN — ONDANSETRON 4 MG: 2 INJECTION INTRAMUSCULAR; INTRAVENOUS at 06:36

## 2019-06-04 RX ADMIN — DIAZEPAM 5 MG: 5 INJECTION, SOLUTION INTRAMUSCULAR; INTRAVENOUS at 23:21

## 2019-06-04 RX ADMIN — HYDROMORPHONE HYDROCHLORIDE 0.5 MG: 2 INJECTION, SOLUTION INTRAMUSCULAR; INTRAVENOUS; SUBCUTANEOUS at 11:39

## 2019-06-04 RX ADMIN — DIAZEPAM 5 MG: 5 INJECTION, SOLUTION INTRAMUSCULAR; INTRAVENOUS at 17:27

## 2019-06-04 RX ADMIN — HYDROMORPHONE HYDROCHLORIDE 0.5 MG: 2 INJECTION, SOLUTION INTRAMUSCULAR; INTRAVENOUS; SUBCUTANEOUS at 06:36

## 2019-06-04 RX ADMIN — ONDANSETRON 4 MG: 2 INJECTION INTRAMUSCULAR; INTRAVENOUS at 11:39

## 2019-06-04 RX ADMIN — ONDANSETRON 4 MG: 2 INJECTION INTRAMUSCULAR; INTRAVENOUS at 23:21

## 2019-06-04 RX ADMIN — ONDANSETRON 4 MG: 2 INJECTION INTRAMUSCULAR; INTRAVENOUS at 15:56

## 2019-06-04 RX ADMIN — SODIUM CHLORIDE: 9 INJECTION, SOLUTION INTRAVENOUS at 17:36

## 2019-06-04 RX ADMIN — ENOXAPARIN SODIUM 40 MG: 100 INJECTION SUBCUTANEOUS at 06:36

## 2019-06-04 ASSESSMENT — ENCOUNTER SYMPTOMS
HEADACHES: 1
NERVOUS/ANXIOUS: 1
MYALGIAS: 1
COUGH: 0
SHORTNESS OF BREATH: 0
SORE THROAT: 1
PHOTOPHOBIA: 1
VOMITING: 1
ABDOMINAL PAIN: 0
NAUSEA: 1
WEAKNESS: 1

## 2019-06-04 ASSESSMENT — COGNITIVE AND FUNCTIONAL STATUS - GENERAL
CLIMB 3 TO 5 STEPS WITH RAILING: A LITTLE
DAILY ACTIVITIY SCORE: 22
STANDING UP FROM CHAIR USING ARMS: A LITTLE
WALKING IN HOSPITAL ROOM: A LITTLE
SUGGESTED CMS G CODE MODIFIER DAILY ACTIVITY: CJ
DRESSING REGULAR LOWER BODY CLOTHING: A LITTLE
MOBILITY SCORE: 19
MOVING TO AND FROM BED TO CHAIR: A LITTLE
MOVING FROM LYING ON BACK TO SITTING ON SIDE OF FLAT BED: A LITTLE
TOILETING: A LITTLE
SUGGESTED CMS G CODE MODIFIER MOBILITY: CK

## 2019-06-04 NOTE — CARE PLAN
Problem: Safety  Goal: Will remain free from falls    Intervention: Assess risk factors for falls  Patient educated regarding importance of calling for assistance prior to getting up, patient verbalized understanding. Bed exit alarm in place. Will continue to monitor.

## 2019-06-04 NOTE — CARE PLAN
Problem: Safety  Goal: Will remain free from falls    Intervention: Assess risk factors for falls  Fall measures in place. Call light within reach, personal belongings close-by, bed in lowest position, IV pole on same side of bathroom, upper bed rails up, hourly rounding in place. Will continue to monitor pt for safety.       Problem: Pain Management  Goal: Pain level will decrease to patient's comfort goal    Intervention: Follow pain managment plan developed in collaboration with patient and Interdisciplinary Team  Pt medicated for 10/10 gen pain per mar, hourly rounding in place.

## 2019-06-04 NOTE — CARE PLAN
Problem: Pain Management  Goal: Pain level will decrease to patient's comfort goal    Intervention: Follow pain managment plan developed in collaboration with patient and Interdisciplinary Team  Patient complains of generalized head to toe 9/10 pain --patient educated on importance of reporting pain accurately, frequency and side effects of pain medication--patient also educated regarding ketoralac including purpose and side effects of pain medication.

## 2019-06-04 NOTE — DISCHARGE PLANNING
Care Transition Team Assessment    LSW met with Pt at bedside to complete assessment. Pt refused to discuss substance history.     Information Source  Orientation : Oriented x 4  Information Given By: Patient  Who is responsible for making decisions for patient? : Patient    Readmission Evaluation  Is this a readmission?: Yes - unplanned readmission    Elopement Risk  Legal Hold: No  Ambulatory or Self Mobile in Wheelchair: Yes  Disoriented: No  Psychiatric Symptoms: None  History of Wandering: No  Elopement this Admit: No  Vocalizing Wanting to Leave: No  Displays Behaviors, Body Language Wanting to Leave: No-Not at Risk for Elopement  Elopement Risk: Not at Risk for Elopement    Interdisciplinary Discharge Planning  Lives with - Patient's Self Care Capacity: Spouse  Patient or legal guardian wants to designate a caregiver (see row info): No    Discharge Preparedness  What is your plan after discharge?: Home with help  What are your discharge supports?: Spouse  Prior Functional Level: Uses Cane, Uses Walker  Difficulity with ADLs: None  Difficulity with IADLs: None    Functional Assesment  Prior Functional Level: Uses Cane, Uses Walker    Finances  Financial Barriers to Discharge: No  Prescription Coverage: Yes    Vision / Hearing Impairment  Vision Impairment : Yes  Right Eye Vision: Wears Glasses  Left Eye Vision: Wears Glasses  Hearing Impairment : No         Advance Directive  Advance Directive?: None  Advance Directive offered?: AD Booklet given    Domestic Abuse  Have you ever been the victim of abuse or violence?: No  Physical Abuse or Sexual Abuse: No  Verbal Abuse or Emotional Abuse: No    Psychological Assessment  History of Substance Abuse: None  History of Psychiatric Problems: Yes    Discharge Risks or Barriers  Discharge risks or barriers?: No  Patient risk factors: Complex medical needs, Mental health    Anticipated Discharge Information  Anticipated discharge disposition: Discharge needs  currently unknown, Home  Discharge Address:  ([id number] [facility] [place] [id number])  Discharge Contact Phone Number:  ([phone])

## 2019-06-04 NOTE — WOUND TEAM
"Saw patient in [facility] who reports she is here for a \"swallowing problem\".   She is independent with ostomy care and has brought her own supplies.  No ostomy concerns at this time.  "

## 2019-06-04 NOTE — PROGRESS NOTES
[facility] Medicine Daily Progress Note    Date of Service  6/4/2019    Chief Complaint  63 y.o. female admitted 6/1/2019 with N/V.    Hospital Course    PMH COPD, Crohn's s/p colostomy, dysphagia followed by GI, chronic pain dependent on opioids, who presented with N/V and admitted for dehydration and FLORES. Dr. Hart with GI was consulted who is familiar with patient, recommended outpatient follow-up for esophageal manometry as well as speech evaluation.  Speech therapy recommended dysphagia 1, thin liquids however patient requested for nectar thick liquids.  She exhibited paranoia and psych was consulted.          Interval Problem Update  Patient is refusing oral medications.  She says she will only take IV right now.  Hypertensive, IV hydralazine given  Eating % meals.  Improved nausea vomiting per nursing, the patient says she has ongoing nausea  She continues to be quite anxious.  She says she hurts from her feet to her head.    Consultants/Specialty  Psych  GI    Code Status  Full    Disposition  Pending controlled psychiatric illness    Review of Systems  Review of Systems   Constitutional: Positive for malaise/fatigue.   HENT: Positive for sore throat.    Eyes: Positive for photophobia.   Respiratory: Negative for cough and shortness of breath.    Cardiovascular: Negative for chest pain.   Gastrointestinal: Positive for nausea and vomiting. Negative for abdominal pain.   Musculoskeletal: Positive for myalgias.   Neurological: Positive for weakness and headaches.   Psychiatric/Behavioral: The patient is nervous/anxious.         Physical Exam  Temp:  [36.1 °C (97 °F)-37 °C (98.6 °F)] 36.8 °C (98.3 °F)  Pulse:  [] 108  Resp:  [17-19] 19  BP: (113-168)/(51-86) 153/78  SpO2:  [95 %-99 %] 99 %    Physical Exam   Constitutional: She is oriented to person, place, and time.   Thin, frail   HENT:   Head: Normocephalic.   Mouth/Throat: Oropharynx is clear and moist.   Eyes: Pupils are equal, round, and  reactive to light. Conjunctivae and EOM are normal.   Cardiovascular: Normal rate and regular rhythm.    Pulmonary/Chest: Effort normal. She has no wheezes. She has no rales.   Abdominal: Soft. There is no tenderness. There is no rebound and no guarding.   Ostomy in place   Musculoskeletal: She exhibits no edema.   Diffuse muscle atrophy   Neurological: She is alert and oriented to person, place, and time.   Skin: Skin is warm and dry.   Psychiatric: Her mood appears anxious.   Pressured speech   Nursing note and vitals reviewed.      Fluids    Intake/Output Summary (Last 24 hours) at 06/04/19 1423  Last data filed at 06/04/19 1000   Gross per 24 hour   Intake              200 ml   Output                0 ml   Net              200 ml       Laboratory  Recent Labs      06/02/19   0358  06/03/19   0058   WBC  7.4  8.1   RBC  4.12*  4.59   HEMOGLOBIN  12.0  13.6   HEMATOCRIT  37.0  42.1   MCV  89.8  91.7   MCH  29.1  29.6   MCHC  32.4*  32.3*   RDW  42.6  44.2   PLATELETCT  226  275   MPV  10.2  10.3     Recent Labs      06/01/19   1715  06/02/19   0358  06/03/19   0058   SODIUM  133*  139  137   POTASSIUM  5.0  3.6  3.7   CHLORIDE  104  106  104   CO2  20  22  24   GLUCOSE  86  80  90   BUN  23*  17  13   CREATININE  1.16  0.99  0.89   CALCIUM  10.2  9.2  10.0                   Imaging  IR-US GUIDED PIV   Final Result    Ultrasound-guided PERIPHERAL IV INSERTION performed by    qualified nursing staff as above.            DX-CHEST-PORTABLE (1 VIEW)   Final Result         1. No acute cardiopulmonary abnormalities are identified.      CT-ABDOMEN-PELVIS W/O   Final Result         1. No evidence of acute inflammatory change.  The study is however limited due to nonuse of intravenous contrast.      2. No CT evidence of small bowel obstruction. Left lower quadrant ostomy.         US-EXTREMITY VENOUS LOWER BILAT   Final Result      DX-ABDOMEN COMPLETE WITH AP OR PA CXR   Final Result         1. No acute abnormality detected.            Assessment/Plan  Intractable cyclical vomiting with nausea- (present on admission)   Assessment & Plan    -Acute on chronic  -no episodes witnessed this hospitalization. Patient reports she has had vomiting.  -continue anti-emetics       Anxiety disorder due to multiple medical problems- (present on admission)   Assessment & Plan    -Continue Valium as needed     Crohn's disease of small intestine with complication ([facility])- (present on admission)   Assessment & Plan    -Chronic with ileostomy  -Followed by Dr. Cruz - surgery and GI consultants  -She reports feeling like she is in an acute flare  -She does not appear to be an acute flare  -CT shows no acute findings  GI recommends outpatient follow-up     Leg pain- (present on admission)   Assessment & Plan    -(-) DVT     Acute kidney injury ([facility])- (present on admission)   Assessment & Plan    Resolved with IV fluids     Hypertension- (present on admission)   Assessment & Plan    -She is refusing oral medications  IV PRN's ordered     COPD (chronic obstructive pulmonary disease) ([facility])- (present on admission)   Assessment & Plan    -Chronic without acute exacerbation     Ileostomy in place ([facility])- (present on admission)   Assessment & Plan    -very small amount stool noted in bag     Paroxysmal atrial fibrillation ([facility])- (present on admission)   Assessment & Plan    -continue metoprolol     Chronic pain- (present on admission)   Assessment & Plan    -She is not wanting to take her oral home medications.  She is asking for 2 mg IV Dilaudid  -History of drug-seeking behavior  -Reports pain from the top of her head to the tips of her toes, associates it with migraine  -She follows Dr. Telles  -Psych trialed valproate, has not helped with her migraine  And Toradol as needed            VTE prophylaxis: lovenox

## 2019-06-04 NOTE — CONSULTS
DATE OF SERVICE:  06/03/2019    GASTROENTEROLOGY CONSULTATION    REQUESTING PHYSICIAN:  Immanuel Davis MD    REASON FOR REQUEST:  Dysphagia, nausea and vomiting.    HISTORY OF PRESENT ILLNESS:  Patient is a 63-year-old female who has a very   long medical history and well known to our service from previous admissions as   well as following up with Dr. Mahan in our office.  She is a difficult   historian with very tangential circular thinking, has difficult time staying   on track with question answering and making history difficult.  We have been   asked to see her because she complains of difficulty swallowing.  This is not   new for her.  She has had episodes of difficulty swallowing for many years and   in fact has had multiple procedures and studies to evaluate this.  She   describes difficulty with both solids and liquids in that she will start to   swallow when she feels it building up inside of her neck region.  She can hear   it gurgling in there.  She was recently admitted with the same complaint and   had an EGD with dilation by Dr. Wynn on the 22nd of May, whereby her procedure   was essentially normal other than a hiatal hernia noted.  There was question   of a very slight nonobstructive Schatzki's ring and so dilation was performed   all the way up to 60-Indonesian, which is max dilation.  After the dilation, there   was no evidence of any kind of blood on the dilators suggesting no   significant dilation took place.  Previous endoscopies including in January   have revealed normal findings and random biopsies have been negative for   eosinophilic esophagitis.  It was recommended to her to get esophageal   manometry, which must be performed as an outpatient in order to evaluate for   functional causes of esophageal dysphagia.  She is having ongoing issues,   although it sounds like she is doing well while inhouse here and she complains   that it is worsened by not getting her pain meds.  She is  seeing speech   therapy and they have started her evaluation, but it is not complete yet.  She   does complain of episodes of nausea and vomiting, which she has been   diagnosed with cyclic vomiting in the past; however, since admission here, now   on the 1st, she has not had any episodes.  She has a long history of Crohn's   disease dating back to 14 years old and she follows with Dr. Mahan in the   office and at this point does not describe any symptoms that are consistent   with a flare of her Crohn's disease.    REVIEW OF SYSTEMS:  Overall positive.  She essentially has a positive review   of systems including fatigue, malaise, headaches, change in vision, hearing   difficulty, shortness of breath, chest pain, trouble swallowing, abdominal   pain, nausea, vomiting, altered bowel habits, urinary difficulties and   dysuria, fevers, chills, weight changes, both weight gain and weight loss,   among others.    PAST MEDICAL HISTORY:  Again, Crohn's disease with ileostomy placement, COPD,   chronic pain, chronic headaches, myalgias, arthralgias, anxiety, atrial   fibrillation, seizure disorder, neurologia.    PAST SURGICAL HISTORY:  Ileostomy with revision in 2014 and 2019, lumbar   fusion, cervical fusion, foot surgery, hand surgery, and multiple procedures   including ileoscopies and EGDs.    DRUG ALLERGIES:  INCLUDE BUT ARE NOT LIMITED TO, TIZANIDINE, SULFASALAZINE,   SULFA DRUGS, AZATHIOPRINE, TAPE, CARAFATE, MORPHINE, ROXANOL, INFLIXIMAB,   AMITRIPTYLINE, DEMEROL, PSEUDOEPHEDRINE, METHOTREXATE, METHADONE, LORAZEPAM,   AND FENTANYL.    MEDICATIONS:  Current medications include acetaminophen, albuterol inhaler,   Norvasc, Dulcolax, Valium, hydralazine, Lovenox subQ, Dilaudid, milk of   magnesia, metoprolol, Zofran, MiraLax, Compazine, senna, and oxycodone.    FAMILY HISTORY:  Crohn's disease in multiple members.    SOCIAL HISTORY:  She does not drink alcohol.  No tobacco use.    PHYSICAL EXAMINATION:  VITAL  SIGNS:  Blood pressure 132/72 with heart rate of 77, respiratory rate   18, afebrile, satting 100% on room air.  GENERAL:  She is very alert, very talkative, difficult to get a good history   from.  She has sunglasses on and ear plugs in, as we discuss her condition.  HEENT:  Reveals that her eyes are anicteric bilaterally.  Her extraocular   movements are intact bilaterally when sunglasses are removed for inspection.    Oral exam reveals a Mallampati 2 score without oral lesions.  CARDIOVASCULAR:  Regular rate and rhythm.  LUNGS:  Clear to auscultation bilaterally in the anterior lung fields.  ABDOMEN:  Soft.  There is some diffuse mild tenderness to palpation.  No   distention.  Normal bowel sounds.  Her ostomy is pink and patent, but with   nothing in the bag.  EXTREMITIES:  Evaluation reveals no pitting edema bilaterally, although are   exquisitely tender to touch.    IMPRESSION/PLAN/MEDICAL DECISION MAKIN.  Dysphagia.  At this point, given negative evaluations via EGD, now twice   in the last 5 months, this is not a structural esophageal condition.  Her last   endoscopy, while a dilation was performed, did not show an obstructive   stricture.  The dilation was performed essentially empirically and dilated up   to max diameter.  There is no indication for repeat EGD as eosinophilic   esophagitis has been ruled out and therapeutics are not effective or needed.    I would suggest that she continues her speech therapy evaluation to completion   to evaluate for rule out and potentially treat oropharyngeal causes of   dysphagia.  If this is negative, we will arrange follow up with us in the   office and can discuss esophageal manometry with her to check for functional   esophageal causes of dysphagia, although I suspect she will not tolerate the   procedure, but certainly needs to be discussed if this does not appear to be   oropharyngeal causes.  I would also entertain in the differential psychiatric   causes  of dysphagia, as she clearly has significant psychiatric issues that   need to be dealt with.  2.  Nausea and vomiting, now resolved.  3.  Crohn's disease, stable.  4.  Ileostomy status, stable.  5.  Anxiety.  6.  Chronic obstructive pulmonary disease.  7.  Atrial fibrillation.  8.  Chronic pain.    At this point, I will sign off.  I have nothing to add as an inpatient.  We   will arrange follow up with Dr. Kristel Mahan in our office, whom she sees,   for continued care for her other issues.       ____________________________________     MD JAMI GLEASON / TUCKER    DD:  06/03/2019 14:12:07  DT:  06/03/2019 17:18:18    D#:  [id number]  Job#:  [id number]

## 2019-06-05 ENCOUNTER — APPOINTMENT (OUTPATIENT)
Dept: RADIOLOGY | Facility: MEDICAL CENTER | Age: 64
DRG: 103 | End: 2019-06-05
Attending: INTERNAL MEDICINE
Payer: MEDICARE

## 2019-06-05 PROBLEM — N17.9 ACUTE KIDNEY INJURY (HCC): Status: RESOLVED | Noted: 2019-06-01 | Resolved: 2019-06-05

## 2019-06-05 LAB
ANION GAP SERPL CALC-SCNC: 10 MMOL/L (ref 0–11.9)
BUN SERPL-MCNC: 15 MG/DL (ref 8–22)
CALCIUM SERPL-MCNC: 9.8 MG/DL (ref 8.5–10.5)
CHLORIDE SERPL-SCNC: 114 MMOL/L (ref 96–112)
CO2 SERPL-SCNC: 20 MMOL/L (ref 20–33)
CREAT SERPL-MCNC: 0.81 MG/DL (ref 0.5–1.4)
GLUCOSE SERPL-MCNC: 88 MG/DL (ref 65–99)
MAGNESIUM SERPL-MCNC: 1.8 MG/DL (ref 1.5–2.5)
PHOSPHATE SERPL-MCNC: 2.6 MG/DL (ref 2.5–4.5)
POTASSIUM SERPL-SCNC: 4 MMOL/L (ref 3.6–5.5)
SODIUM SERPL-SCNC: 144 MMOL/L (ref 135–145)

## 2019-06-05 PROCEDURE — 700105 HCHG RX REV CODE 258: Performed by: INTERNAL MEDICINE

## 2019-06-05 PROCEDURE — 80048 BASIC METABOLIC PNL TOTAL CA: CPT

## 2019-06-05 PROCEDURE — 99233 SBSQ HOSP IP/OBS HIGH 50: CPT | Performed by: INTERNAL MEDICINE

## 2019-06-05 PROCEDURE — 770006 HCHG ROOM/CARE - MED/SURG/GYN SEMI*

## 2019-06-05 PROCEDURE — 700111 HCHG RX REV CODE 636 W/ 250 OVERRIDE (IP): Performed by: HOSPITALIST

## 2019-06-05 PROCEDURE — 36415 COLL VENOUS BLD VENIPUNCTURE: CPT

## 2019-06-05 PROCEDURE — 84100 ASSAY OF PHOSPHORUS: CPT

## 2019-06-05 PROCEDURE — 700111 HCHG RX REV CODE 636 W/ 250 OVERRIDE (IP): Performed by: INTERNAL MEDICINE

## 2019-06-05 PROCEDURE — 83735 ASSAY OF MAGNESIUM: CPT

## 2019-06-05 RX ORDER — SODIUM CHLORIDE, SODIUM LACTATE, POTASSIUM CHLORIDE, CALCIUM CHLORIDE 600; 310; 30; 20 MG/100ML; MG/100ML; MG/100ML; MG/100ML
INJECTION, SOLUTION INTRAVENOUS CONTINUOUS
Status: DISCONTINUED | OUTPATIENT
Start: 2019-06-05 | End: 2019-06-06

## 2019-06-05 RX ADMIN — HYDROMORPHONE HYDROCHLORIDE 0.5 MG: 2 INJECTION, SOLUTION INTRAMUSCULAR; INTRAVENOUS; SUBCUTANEOUS at 04:50

## 2019-06-05 RX ADMIN — ONDANSETRON 4 MG: 2 INJECTION INTRAMUSCULAR; INTRAVENOUS at 20:08

## 2019-06-05 RX ADMIN — ONDANSETRON 4 MG: 2 INJECTION INTRAMUSCULAR; INTRAVENOUS at 04:51

## 2019-06-05 RX ADMIN — ONDANSETRON 4 MG: 2 INJECTION INTRAMUSCULAR; INTRAVENOUS at 08:39

## 2019-06-05 RX ADMIN — DIAZEPAM 5 MG: 5 INJECTION, SOLUTION INTRAMUSCULAR; INTRAVENOUS at 20:09

## 2019-06-05 RX ADMIN — HYDROMORPHONE HYDROCHLORIDE 0.5 MG: 2 INJECTION, SOLUTION INTRAMUSCULAR; INTRAVENOUS; SUBCUTANEOUS at 08:39

## 2019-06-05 RX ADMIN — DIAZEPAM 5 MG: 5 INJECTION, SOLUTION INTRAMUSCULAR; INTRAVENOUS at 11:35

## 2019-06-05 RX ADMIN — ONDANSETRON 4 MG: 2 INJECTION INTRAMUSCULAR; INTRAVENOUS at 16:20

## 2019-06-05 RX ADMIN — SODIUM CHLORIDE: 9 INJECTION, SOLUTION INTRAVENOUS at 08:39

## 2019-06-05 RX ADMIN — HYDROMORPHONE HYDROCHLORIDE 0.5 MG: 2 INJECTION, SOLUTION INTRAMUSCULAR; INTRAVENOUS; SUBCUTANEOUS at 16:20

## 2019-06-05 RX ADMIN — ENOXAPARIN SODIUM 40 MG: 100 INJECTION SUBCUTANEOUS at 04:51

## 2019-06-05 RX ADMIN — SODIUM CHLORIDE, POTASSIUM CHLORIDE, SODIUM LACTATE AND CALCIUM CHLORIDE: 600; 310; 30; 20 INJECTION, SOLUTION INTRAVENOUS at 12:38

## 2019-06-05 ASSESSMENT — ENCOUNTER SYMPTOMS
VOMITING: 1
HEADACHES: 1
NERVOUS/ANXIOUS: 1
SORE THROAT: 1
WEAKNESS: 1
SHORTNESS OF BREATH: 0
ABDOMINAL PAIN: 1
PHOTOPHOBIA: 1
NAUSEA: 1
MYALGIAS: 1
COUGH: 0

## 2019-06-05 NOTE — ASSESSMENT & PLAN NOTE
Complains of migraine  Refusing all oral medications, only wants IV dilaudid  I discontinued dilaudid and ordered liquid oxycodone since she cannot take pills

## 2019-06-05 NOTE — CARE PLAN
Problem: Safety  Goal: Will remain free from injury  Outcome: PROGRESSING AS EXPECTED  Safety precautions implemented    Problem: Pain Management  Goal: Pain level will decrease to patient's comfort goal  Outcome: PROGRESSING AS EXPECTED  Medicated per mar, discussed pain plan with patient

## 2019-06-05 NOTE — PROGRESS NOTES
Patient tangential and nonsensical with speech. Educated regarding medications and care plan. Patient states she cannot swallow pills, refused oral medications. Will continue to monitor.

## 2019-06-05 NOTE — PROGRESS NOTES
"Assumed patient care at 0700.  Patient is alert, awake, and oriented x 4, non labored breathing on room air, no signs of acute distress noted.  Patient has tangential thinking, verbalizes to RN thoughts that do not seem to connect.  Patient is very particular about cleanliness and how to do certain tasks such as colostomy changes and storing her food in the cooler.  Patient does endorse generalized pain, migraines, and ringing on her ears which she states makes her hear \"stuff from a mile away\"  Patient does ambulate with RN and CNA with 1 person assist FWW, steady, but weak gait noted.  Patient still endorses nausea and abdominal tenderness, but was able to eat her lunch without any difficulty.  No vomiting noted for today.  Pain medications given PRN with good effect. Patient had a shower today and was up to the chair this morning and afternoon. Bed in lowest position. Call light and belongings within reach. Will continue to monitor  "

## 2019-06-05 NOTE — CARE PLAN
Problem: Safety  Goal: Will remain free from falls    Intervention: Implement fall precautions   06/04/19 2097   OTHER   Environmental Precautions Personal Belongings, Wastebasket, Call Bell etc. in Easy Reach;Transferred to [facility] Side   IV Pole on Same Side of Bed as Bathroom Yes   Bed Alarm Patient Educated Regarding Fall Risk and Need for Bed Alarm, Understands and Continues to Refuse   Calls appropriately.      Problem: Pain Management  Goal: Pain level will decrease to patient's comfort goal    Intervention: Follow pain managment plan developed in collaboration with patient and Interdisciplinary Team  Due med given as ordered and health teaching regarding narcotics side effects.

## 2019-06-05 NOTE — PROGRESS NOTES
"Received alert and oriented x 4. Yelling for help going to the rest room. \" I don\"t care, somebody help me, this thing will burst anytime now\", Check vitals sign and recorded accordingly and due med given per MAR. Monitor sign and symptoms of respiratory distress and treatment given accordingly per MAR.Medicated per MAR and reassessed every 2 hours per protocol. Call light within reach. Refused bed alarm despite health teaching given.Call appropriately. Needs attended. Will continue to monitor./82   Pulse 99   Temp 36.6 °C (97.9 °F) (Temporal)   Resp 20   Ht 1.829 m (6')   Wt 68.6 kg (151 lb 3.8 oz)   SpO2 94%   Breastfeeding? No   BMI 20.51 kg/m² .  "

## 2019-06-06 ENCOUNTER — APPOINTMENT (OUTPATIENT)
Dept: RADIOLOGY | Facility: MEDICAL CENTER | Age: 64
DRG: 103 | End: 2019-06-06
Attending: INTERNAL MEDICINE
Payer: MEDICARE

## 2019-06-06 ENCOUNTER — PATIENT OUTREACH (OUTPATIENT)
Dept: HEALTH INFORMATION MANAGEMENT | Facility: OTHER | Age: 64
End: 2019-06-06

## 2019-06-06 PROBLEM — R11.15 INTRACTABLE CYCLICAL VOMITING WITH NAUSEA: Status: RESOLVED | Noted: 2018-12-28 | Resolved: 2019-06-06

## 2019-06-06 PROBLEM — M79.606 LEG PAIN: Status: RESOLVED | Noted: 2019-06-01 | Resolved: 2019-06-06

## 2019-06-06 LAB
ANION GAP SERPL CALC-SCNC: 9 MMOL/L (ref 0–11.9)
BUN SERPL-MCNC: 13 MG/DL (ref 8–22)
CALCIUM SERPL-MCNC: 9.8 MG/DL (ref 8.5–10.5)
CHLORIDE SERPL-SCNC: 110 MMOL/L (ref 96–112)
CO2 SERPL-SCNC: 22 MMOL/L (ref 20–33)
CREAT SERPL-MCNC: 0.75 MG/DL (ref 0.5–1.4)
GLUCOSE SERPL-MCNC: 85 MG/DL (ref 65–99)
MAGNESIUM SERPL-MCNC: 1.8 MG/DL (ref 1.5–2.5)
POTASSIUM SERPL-SCNC: 3.9 MMOL/L (ref 3.6–5.5)
SODIUM SERPL-SCNC: 141 MMOL/L (ref 135–145)

## 2019-06-06 PROCEDURE — 36415 COLL VENOUS BLD VENIPUNCTURE: CPT

## 2019-06-06 PROCEDURE — 700111 HCHG RX REV CODE 636 W/ 250 OVERRIDE (IP): Performed by: FAMILY MEDICINE

## 2019-06-06 PROCEDURE — 700111 HCHG RX REV CODE 636 W/ 250 OVERRIDE (IP): Performed by: HOSPITALIST

## 2019-06-06 PROCEDURE — 700111 HCHG RX REV CODE 636 W/ 250 OVERRIDE (IP): Performed by: INTERNAL MEDICINE

## 2019-06-06 PROCEDURE — 99232 SBSQ HOSP IP/OBS MODERATE 35: CPT | Performed by: INTERNAL MEDICINE

## 2019-06-06 PROCEDURE — 700105 HCHG RX REV CODE 258: Performed by: PSYCHIATRY & NEUROLOGY

## 2019-06-06 PROCEDURE — 700111 HCHG RX REV CODE 636 W/ 250 OVERRIDE (IP): Performed by: PSYCHIATRY & NEUROLOGY

## 2019-06-06 PROCEDURE — 92526 ORAL FUNCTION THERAPY: CPT

## 2019-06-06 PROCEDURE — 80048 BASIC METABOLIC PNL TOTAL CA: CPT

## 2019-06-06 PROCEDURE — 700105 HCHG RX REV CODE 258: Performed by: INTERNAL MEDICINE

## 2019-06-06 PROCEDURE — 770006 HCHG ROOM/CARE - MED/SURG/GYN SEMI*

## 2019-06-06 PROCEDURE — 99232 SBSQ HOSP IP/OBS MODERATE 35: CPT | Performed by: PSYCHIATRY & NEUROLOGY

## 2019-06-06 PROCEDURE — 83735 ASSAY OF MAGNESIUM: CPT

## 2019-06-06 RX ORDER — OXYCODONE HCL 20 MG/ML
5-10 CONCENTRATE, ORAL ORAL EVERY 4 HOURS PRN
Status: DISCONTINUED | OUTPATIENT
Start: 2019-06-06 | End: 2019-06-08 | Stop reason: HOSPADM

## 2019-06-06 RX ADMIN — KETOROLAC TROMETHAMINE 15 MG: 30 INJECTION, SOLUTION INTRAMUSCULAR; INTRAVENOUS at 15:50

## 2019-06-06 RX ADMIN — HYDROMORPHONE HYDROCHLORIDE 0.5 MG: 2 INJECTION, SOLUTION INTRAMUSCULAR; INTRAVENOUS; SUBCUTANEOUS at 02:10

## 2019-06-06 RX ADMIN — ENOXAPARIN SODIUM 40 MG: 100 INJECTION SUBCUTANEOUS at 05:27

## 2019-06-06 RX ADMIN — HYDRALAZINE HYDROCHLORIDE 10 MG: 20 INJECTION INTRAMUSCULAR; INTRAVENOUS at 20:47

## 2019-06-06 RX ADMIN — ONDANSETRON 4 MG: 2 INJECTION INTRAMUSCULAR; INTRAVENOUS at 15:47

## 2019-06-06 RX ADMIN — VALPROATE SODIUM 1000 MG: 100 INJECTION, SOLUTION INTRAVENOUS at 20:43

## 2019-06-06 RX ADMIN — SODIUM CHLORIDE, POTASSIUM CHLORIDE, SODIUM LACTATE AND CALCIUM CHLORIDE: 600; 310; 30; 20 INJECTION, SOLUTION INTRAVENOUS at 05:28

## 2019-06-06 RX ADMIN — KETOROLAC TROMETHAMINE 15 MG: 30 INJECTION, SOLUTION INTRAMUSCULAR; INTRAVENOUS at 05:26

## 2019-06-06 RX ADMIN — DIAZEPAM 5 MG: 5 INJECTION, SOLUTION INTRAMUSCULAR; INTRAVENOUS at 16:02

## 2019-06-06 RX ADMIN — ONDANSETRON 4 MG: 2 INJECTION INTRAMUSCULAR; INTRAVENOUS at 02:10

## 2019-06-06 ASSESSMENT — ENCOUNTER SYMPTOMS
VOMITING: 1
NAUSEA: 1
HEADACHES: 1
NERVOUS/ANXIOUS: 1
MYALGIAS: 1

## 2019-06-06 NOTE — PROGRESS NOTES
RN went to bedside to remove IV. Patient stated they wanted to appeal their DC. Dr. Eason notified and to see patient. Social work and case management notified and gave patient paperwork.

## 2019-06-06 NOTE — PROGRESS NOTES
Patient sitting on side of bed. Stated they were cold. Patient refused to get dressed. Patient rewrapped in blanket. Basin filled with hot water for feet. Charge RN notified of patient request to speak to them.

## 2019-06-06 NOTE — PROGRESS NOTES
Patient c/o IV not working. Patient refused to let this RN change it stating they wanted to wait and see if they were going home.

## 2019-06-06 NOTE — PROGRESS NOTES
Hospital Medicine Daily Progress Note    Date of Service  6/6/2019    Chief Complaint  63 y.o. female admitted 6/1/2019 with N/V.    Hospital Course    PMH COPD, Crohn's s/p colostomy, dysphagia followed by GI, chronic pain dependent on opioids, who presented with N/V and admitted for dehydration and FLORES. Dr. Hart with GI was consulted who is familiar with patient, recommended outpatient follow-up for esophageal manometry as well as speech evaluation.  Speech therapy recommended dysphagia 1, thin liquids however patient requested for nectar thick liquids.  She had no vomiting while hospitalized and her renal function returned to normal. She exhibited paranoia and kely and psych was consulted.          Interval Problem Update  Continues to refuse oral meds. Received 1 dose valium overnight.   She is noted to be eating % of meals no vomiting since she's been here.  She appears comfortable, not wearing sunglasses today and soaking her feet at bedside.  I discussed with her that she is medically stable and she says she was ok with discharge. I spoke with Dr. Rivera that I will be discharging the patient.   Patient then wants to appeal her discharge.    Consultants/Specialty  Psych  GI    Code Status  Full    Disposition  Pending controlled psychiatric illness    Review of Systems  Review of Systems   Unable to perform ROS: Psychiatric disorder   Gastrointestinal: Positive for nausea and vomiting.   Musculoskeletal: Positive for myalgias.   Neurological: Positive for headaches.   Psychiatric/Behavioral: The patient is nervous/anxious.         Physical Exam  Temp:  [36.7 °C (98 °F)-37.3 °C (99.1 °F)] 36.7 °C (98 °F)  Pulse:  [] 89  Resp:  [18-20] 18  BP: (127-146)/(67-89) 138/78  SpO2:  [93 %-96 %] 96 %    Physical Exam   Constitutional:   Thin, frail   HENT:   Head: Normocephalic.   Mouth/Throat: Oropharynx is clear and moist.   Eyes: Pupils are equal, round, and reactive to light. Conjunctivae are  normal.   Cardiovascular: Normal rate and regular rhythm.    Pulmonary/Chest: Effort normal. She has no wheezes. She has no rales.   Abdominal: Soft. Bowel sounds are normal. She exhibits no distension. There is tenderness (diffusely with light palpation). There is no rebound and no guarding.   Ostomy in place   Musculoskeletal: She exhibits no edema.   Diffuse muscle atrophy   Neurological: She is alert.   Oriented to self and place   Skin: Skin is warm and dry.   Psychiatric: Her mood appears anxious. Her speech is tangential.   Pressured speech   Nursing note and vitals reviewed.      Fluids    Intake/Output Summary (Last 24 hours) at 06/06/19 1429  Last data filed at 06/06/19 1300   Gross per 24 hour   Intake              120 ml   Output             1450 ml   Net            -1330 ml       Laboratory      Recent Labs      06/05/19   0606  06/06/19   0546   SODIUM  144  141   POTASSIUM  4.0  3.9   CHLORIDE  114*  110   CO2  20  22   GLUCOSE  88  85   BUN  15  13   CREATININE  0.81  0.75   CALCIUM  9.8  9.8                   Imaging  IR-US GUIDED PIV   Final Result    Ultrasound-guided PERIPHERAL IV INSERTION performed by    qualified nursing staff as above.            IR-US GUIDED PIV   Final Result    Ultrasound-guided PERIPHERAL IV INSERTION performed by    qualified nursing staff as above.            DX-CHEST-PORTABLE (1 VIEW)   Final Result         1. No acute cardiopulmonary abnormalities are identified.      CT-ABDOMEN-PELVIS W/O   Final Result         1. No evidence of acute inflammatory change.  The study is however limited due to nonuse of intravenous contrast.      2. No CT evidence of small bowel obstruction. Left lower quadrant ostomy.         US-EXTREMITY VENOUS LOWER BILAT   Final Result      DX-ABDOMEN COMPLETE WITH AP OR PA CXR   Final Result         1. No acute abnormality detected.      IR-US GUIDED PIV    (Results Pending)        Assessment/Plan  * Anxiety disorder due to multiple medical  problems- (present on admission)   Assessment & Plan    With manic presentation  Psych following  Given dose of IV depacon without much improvement  Continue Valium as needed     Crohn's disease of small intestine with complication (HCC)- (present on admission)   Assessment & Plan    -Chronic with ileostomy  -Followed by Dr. Cruz - surgery and GI consultants  -She reports feeling like she is in an acute flare  -She does not appear to be an acute flare  -CT shows no acute findings  GI recommends outpatient follow-up     Migraine- (present on admission)   Assessment & Plan    Complains of migraine  Refusing all oral medications, only wants IV dilaudid  I discontinued dilaudid and ordered liquid oxycodone since she cannot take pills     Hypertension- (present on admission)   Assessment & Plan    -She is refusing oral medications  IV PRN's ordered     COPD (chronic obstructive pulmonary disease) ([facility])- (present on admission)   Assessment & Plan    -Chronic without acute exacerbation     Ileostomy in place ([facility])- (present on admission)   Assessment & Plan    Wound care     Paroxysmal atrial fibrillation ([facility])- (present on admission)   Assessment & Plan    -continue metoprolol  - only on aspirin after discussion with cardiology outpatient     Chronic pain- (present on admission)   Assessment & Plan    -She is not wanting to take her oral home medications.  She is asking for 2 mg IV Dilaudid  -History of drug-seeking behavior  -Reports pain from the top of her head to the tips of her toes, associates it with migraine  -She follows Dr. Telles  -Psych trialed valproate, has not helped with her migraine  Liquid oxycodone PRN ordered            VTE prophylaxis: lovenox

## 2019-06-06 NOTE — CARE PLAN
Problem: Safety  Goal: Will remain free from falls    Intervention: Implement fall precautions   06/05/19 7940   OTHER   Environmental Precautions Treaded Slipper Socks on Patient;Personal Belongings, Wastebasket, Call Bell etc. in Easy Reach;Transferred to Stronger Side;Report Given to Other Health Care Providers Regarding Fall Risk;Bed in Low Position   Bedrails Bedrails Closest to Bathroom Down   Bed Alarm Patient Educated Regarding Fall Risk and Need for Bed Alarm, Understands and Continues to Refuse   Pt calls frequently for little things.      Problem: Pain Management  Goal: Pain level will decrease to patient's comfort goal    Intervention: Follow pain managment plan developed in collaboration with patient and Interdisciplinary Team  Teaching given regarding about her pain medication during sleep hours per policy should not be given if prn only, verbalized understanding but insisting to have even she's sleeping, need more education.

## 2019-06-06 NOTE — PROGRESS NOTES
"Patients speech rambling and tangential. Patient stating they do not want anything shoved in \"their rectum\" tonight while they sleep do to it being fixed and normal by their provider outpatient.     "

## 2019-06-06 NOTE — PROGRESS NOTES
Patient noted to be anxious. Patient c/o pain and nausea. Patient refused offer of oral meds stating they wernt able to swallow them.

## 2019-06-06 NOTE — PROGRESS NOTES
"Patient c/o both IVs \"infiltrated.\" Patient refused to let this RN assess them or remove them.   "

## 2019-06-06 NOTE — PROGRESS NOTES
"Patient very irritable and anxious this shift. Thoughts tangential. Conversation wandering.     0745  Patient c/o \"both IVs infiltrated.\" Patient refused to let RN assess or remove them. Patient stating only a \"vascular access RN\" can remove them. Patient denied any further needs or concerns at this time.     0800 Patient sitting on side of bed at shift change soaking feet in basin. Patient refused RNs offer to help get dressed back and back to bed.   Patient rewrapped in blanket. Basins water changed x2 to warm water. Patient verbalized satisfaction. Denied any further need or complaints at this time.     0945 RN Rounded on patient. Patient denied any needs at this time.     1100  Patient resting in be Denied any needs at this time.destiny Stated NA was helping them pack and was doing a good job and did not need any other help at this time.     1215  This RN Went to prep patient for DC per MD order. Previously this shift patient verbalized to nursing staff wanting to go home. Patient very irritable with rapid and tanegntial speech. Patient stating they wanted and \"apology\" from staff. Patient stated in the past they had gotten a apology \"from the .\" Patient had multiple complaints about care. Stating they \"do not want money\" they just want \"an apology.\" RN addressed all patients requests. Notified Dr. Eason, Charge RN Sujey, and SW of patient requesting appeal.   "

## 2019-06-06 NOTE — PROGRESS NOTES
"Patient alert and oriented x4. Mood noted to be labile. Patient burst into tears when stating missed their spouse.     Patient stating they have \"super sonic hearing\" and could be used by the  for their hearing stating they could \"probably hear through a metal tank.\"  Patient redirected.   "

## 2019-06-06 NOTE — PROGRESS NOTES
1602  PRN Toradol given for c/o pain, and PRN zofran for c/o nausea.   1615  Patient anxious and irritable, c/o muscle spasms. PRN valium given per MD orders.   1657  Upon reassessment patient resting comfortably. NAD noted. Breathing regular and unlabored on room air. Will continue to monitor.

## 2019-06-06 NOTE — PROGRESS NOTES
No IV access at this time. Patient refused to let RN try to start IV stating they only wanted VAT RN to start their IV.   Patient c/o pain. Patient offered oral pain medications. Patient refused.

## 2019-06-06 NOTE — PROGRESS NOTES
Discussed with Dr. Eason patient status and plan to transition patient to oral pain medications from IV pain meds in order to ensure patient able to control pain at home on oral pain meds.  See orders.

## 2019-06-06 NOTE — DISCHARGE INSTRUCTIONS
Discharge Instructions    Discharged to home by car with relative. Discharged via wheelchair, hospital escort: Yes.  Special equipment needed: Not Applicable    Be sure to schedule a follow-up appointment with your primary care doctor or any specialists as instructed.     Discharge Plan:   Influenza Vaccine Indication: Not indicated: Previously immunized this influenza season and > 8 years of age    I understand that a diet low in cholesterol, fat, and sodium is recommended for good health. Unless I have been given specific instructions below for another diet, I accept this instruction as my diet prescription.   Other diet: Regular      Special Instructions: None    · Is patient discharged on Warfarin / Coumadin?   No   Vomiting, Adult  Vomiting occurs when stomach contents are thrown up and out of the mouth. Many people notice nausea before vomiting. Vomiting can make you feel weak and dehydrated. Dehydration can make you tired and thirsty, cause you to have a dry mouth, and decrease how often you urinate. Older adults and people who have other diseases or a weak immune system are at higher risk for dehydration. It is important to treat vomiting as told by your health care provider.  Follow these instructions at home:  Follow your health care provider’s instructions about how to care for yourself at home.  Eating and drinking  Follow these recommendations as told by your health care provider:  · Take an oral rehydration solution (ORS). This is a drink that is sold at pharmacies and retail stores.  · Eat bland, easy-to-digest foods in small amounts as you are able. These foods include bananas, applesauce, rice, lean meats, toast, and crackers.  · Drink clear fluids in small amounts as you are able. Clear fluids include water, ice chips, low-calorie sports drinks, and fruit juice that has water added (diluted fruit juice).  · Avoid fluids that contain a lot of sugar or caffeine.  · Avoid alcohol and foods that are  spicy or fatty.  General instructions  · Wash your hands frequently with soap and water. If soap and water are not available, use hand . Make sure that everyone in your household washes their hands frequently.  · Take over-the-counter and prescription medicines only as told by your health care provider.  · Watch your condition for any changes.  · Keep all follow-up visits as told by your health care provider. This is important.  Contact a health care provider if:  · You have a fever.  · You are not able to keep fluids down.  · Your vomiting gets worse.  · You have new symptoms.  · You feel light-headed or dizzy.  · You have a headache.  · You have muscle cramps.  Get help right away if:  · You have pain in your chest, neck, arm, or jaw.  · You feel extremely weak or you faint.  · You have persistent vomiting.  · You have vomit that is bright red or looks like black coffee grounds.  · You have stools that are bloody or black, or stools that look like tar.  · You have severe pain, cramping, or bloating in your abdomen.  · You have a severe headache, a stiff neck, or both.  · You have a rash.  · You have trouble breathing or you are breathing very quickly.  · Your heart is beating very quickly.  · Your skin feels cold and clammy.  · You feel confused.  · You have pain while urinating.  · You have signs of dehydration, such as:  ¨ Dark urine, or very little or no urine.  ¨ Cracked lips.  ¨ Dry mouth.  ¨ Sunken eyes.  ¨ Sleepiness.  ¨ Weakness.  These symptoms may represent a serious problem that is an emergency. Do not wait to see if the symptoms will go away. Get medical help right away. Call your local emergency services (911 in the [place] Do not drive yourself to the hospital.   This information is not intended to replace advice given to you by your health care provider. Make sure you discuss any questions you have with your health care provider.  Document Released: 01/13/2017 Document Revised: 05/25/2017  Document Reviewed: 08/23/2016  [employer] Interactive Patient Education © 2017 [employer]    Nausea, Adult  Introduction  Feeling sick to your stomach (nausea) means that your stomach is upset or you feel like you have to throw up (vomit). Feeling sick to your stomach is usually not serious, but it may be an early sign of a more serious medical problem. As you feel sicker to your stomach, it can lead to throwing up (vomiting). If you throw up, or if you are not able to drink enough fluids, there is a risk of dehydration. Dehydration can make you feel tired and thirsty, have a dry mouth, and pee (urinate) less often. Older adults and people who have other diseases or a weak defense (immune) system have a higher risk of dehydration.  The main goal of treating this condition is to:  · Limit how often you feel sick to your stomach.  · Prevent throwing up and dehydration.  Follow these instructions at home:  Follow instructions from your doctor about how to care for yourself at home.  Eating and drinking  Follow these recommendations as told by your doctor:  · Take an oral rehydration solution (ORS). This is a drink that is sold at pharmacies and stores.  · Drink clear fluids in small amounts as you are able, such as:  ¨ Water.  ¨ Ice chips.  ¨ Fruit juice that has water added (diluted fruit juice).  ¨ Low-calorie sports drinks.  · Eat bland, easy to digest foods in small amounts as you are able, such as:  ¨ Bananas.  ¨ Applesauce.  ¨ Rice.  ¨ Lean meats.  ¨ Toast.  ¨ Crackers.  · Avoid drinking fluids that contain a lot of sugar or caffeine.  · Avoid alcohol.  · Avoid spicy or fatty foods.  General instructions  · Drink enough fluid to keep your pee (urine) clear or pale yellow.  · Wash your hands often. If you cannot use soap and water, use hand .  · Make sure that all people in your household wash their hands well and often.  · Rest at home while you get better.  · Take over-the-counter and prescription  medicines only as told by your doctor.  · Breathe slowly and deeply when you feel sick to your stomach.  · Watch your condition for any changes.  · Keep all follow-up visits as told by your doctor. This is important.  Contact a doctor if:  · You have a headache.  · You have new symptoms.  · You feel sicker to your stomach.  · You have a fever.  · You feel light-headed or dizzy.  · You throw up.  · You are not able to keep fluids down.  Get help right away if:  · You have pain in your chest, neck, arm, or jaw.  · You feel very weak or you pass out (faint).  · You have throw up that is bright red or looks like coffee grounds.  · You have bloody or black poop (stools), or poop that looks like tar.  · You have a very bad headache, a stiff neck, or both.  · You have very bad pain, cramping, or bloating in your belly.  · You have a rash.  · You have trouble breathing or you are breathing very quickly.  · Your heart is beating very quickly.  · Your skin feels cold and clammy.  · You feel confused.  · You have pain while peeing.  · You have signs of dehydration, such as:  ¨ Dark pee, or very little or no pee.  ¨ Cracked lips.  ¨ Dry mouth.  ¨ Sunken eyes.  ¨ Sleepiness.  ¨ Weakness.  These symptoms may be an emergency. Do not wait to see if the symptoms will go away. Get medical help right away. Call your local emergency services (911 in the [place] Do not drive yourself to the hospital.   This information is not intended to replace advice given to you by your health care provider. Make sure you discuss any questions you have with your health care provider.  Document Released: 12/06/2012 Document Revised: 05/25/2017 Document Reviewed: 08/23/2016  © 2017 [employer]      Depression / Suicide Risk    As you are discharged from this [facility] facility, it is important to learn how to keep safe from harming yourself.    Recognize the warning signs:  · Abrupt changes in personality, positive or negative- including increase in  energy   · Giving away possessions  · Change in eating patterns- significant weight changes-  positive or negative  · Change in sleeping patterns- unable to sleep or sleeping all the time   · Unwillingness or inability to communicate  · Depression  · Unusual sadness, discouragement and loneliness  · Talk of wanting to die  · Neglect of personal appearance   · Rebelliousness- reckless behavior  · Withdrawal from people/activities they love  · Confusion- inability to concentrate     If you or a loved one observes any of these behaviors or has concerns about self-harm, here's what you can do:  · Talk about it- your feelings and reasons for harming yourself  · Remove any means that you might use to hurt yourself (examples: pills, rope, extension cords, firearm)  · Get professional help from the community (Mental Health, Substance Abuse, psychological counseling)  · Do not be alone:Call your Safe Contact- someone whom you trust who will be there for you.  · Call your local CRISIS HOTLINE 237-9757 or 442-253-5147  · Call your local Children's Mobile Crisis Response Team [place] (543) 532-0103 or www.Med Access  · Call the toll free National Suicide Prevention Hotlines   · National Suicide Prevention Lifeline 924-554-JWQK (3404)  · National Hope Line Network 800-SUICIDE (726-1988)

## 2019-06-06 NOTE — DISCHARGE PLANNING
LSW met with pt at bedside and discussed right to appeal discharge. Pt signed IMM and stated she will be appealing the discharge. Pt has contact information for [facility]. LSW will follow up with pt on appeal.
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
## 2019-06-06 NOTE — PROGRESS NOTES
Received alert and oriented x 4, v/s stable, still c/o chronic pain, all due med given as ordered and ivf continued, frequent calls using the restroom for her ileostomy, call light within reach, refused bed alarm despite health teaching given, needs attended promptly, will continue to monitor pain and n/v.

## 2019-06-06 NOTE — THERAPY
"Speech Language Therapy dysphagia treatment completed.   Functional Status:  Patient awake, alert, and pleasant, but extremely tangential w/ poor attention and difficult to redirect during tx session. Patient currently on Dys1/thin liquid diet, per her request, although crunchy Atkins protein bars were noted at bedside and patient gave indirect answer when asked if she can eat them d/t hx of esophageal dysphagia. Patient consumed PO trials of purees, milkshake, and thin liquids via cup sip, which she reported were \"thickened\", but they were thin liquid, which is consistent w/ SLP report from initial swallow evaluation this admit. Patient consumed all PO trials with no overt s/sx of aspiration. Patient intermittently utilizing chin tuck and reporting odynophagia, but no s/sx of aspiration noted and patient unable to report who taught her chin tuck. Laryngeal elevation palpated as complete. Initiation of swallow trigger was timely. Patient too distracted and tangential to complete upgraded PO trials and appearing anxious regarding same. At this time, recommend patient continue Dys1/thin liquid diet, per patient request. RN aware. SLP is following if patient does not d/c.     Recommendations: At this time, recommend patient continue Dys1/thin liquid diet, per patient request.   Plan of Care: Will benefit from Speech Therapy 3 times per week if patient does not d/c   Post-Acute Therapy: Recommend outpatient or home health transitional care services for continued speech therapy services    See \"Rehab Therapy-Acute\" Patient Summary Report for complete documentation.     "

## 2019-06-06 NOTE — CARE PLAN
Problem: Pain Management  Goal: Pain level will decrease to patient's comfort goal  Outcome: PROGRESSING AS EXPECTED  Patient will report pain less then 3 this shift.     Problem: Psychosocial Needs:  Goal: Level of anxiety will decrease  Outcome: PROGRESSING SLOWER THAN EXPECTED  Patient restless and anxious. Patient had multiple complaints and needs. Needs addressed. No further needs at this time. Will continue to monitor.

## 2019-06-07 ENCOUNTER — APPOINTMENT (OUTPATIENT)
Dept: RADIOLOGY | Facility: MEDICAL CENTER | Age: 64
DRG: 103 | End: 2019-06-07
Attending: INTERNAL MEDICINE
Payer: MEDICARE

## 2019-06-07 PROCEDURE — 700101 HCHG RX REV CODE 250: Performed by: INTERNAL MEDICINE

## 2019-06-07 PROCEDURE — 72100 X-RAY EXAM L-S SPINE 2/3 VWS: CPT

## 2019-06-07 PROCEDURE — 700105 HCHG RX REV CODE 258: Performed by: INTERNAL MEDICINE

## 2019-06-07 PROCEDURE — 99232 SBSQ HOSP IP/OBS MODERATE 35: CPT | Performed by: INTERNAL MEDICINE

## 2019-06-07 PROCEDURE — 700111 HCHG RX REV CODE 636 W/ 250 OVERRIDE (IP): Performed by: INTERNAL MEDICINE

## 2019-06-07 PROCEDURE — 700111 HCHG RX REV CODE 636 W/ 250 OVERRIDE (IP): Performed by: HOSPITALIST

## 2019-06-07 PROCEDURE — 770006 HCHG ROOM/CARE - MED/SURG/GYN SEMI*

## 2019-06-07 RX ORDER — ONDANSETRON 2 MG/ML
4 INJECTION INTRAMUSCULAR; INTRAVENOUS EVERY 6 HOURS
Status: DISCONTINUED | OUTPATIENT
Start: 2019-06-07 | End: 2019-06-08 | Stop reason: HOSPADM

## 2019-06-07 RX ORDER — SODIUM CHLORIDE 9 MG/ML
INJECTION, SOLUTION INTRAVENOUS CONTINUOUS
Status: DISCONTINUED | OUTPATIENT
Start: 2019-06-07 | End: 2019-06-08 | Stop reason: HOSPADM

## 2019-06-07 RX ORDER — ENALAPRILAT 1.25 MG/ML
1.25 INJECTION INTRAVENOUS EVERY 6 HOURS PRN
Status: DISCONTINUED | OUTPATIENT
Start: 2019-06-07 | End: 2019-06-08 | Stop reason: HOSPADM

## 2019-06-07 RX ADMIN — ENOXAPARIN SODIUM 40 MG: 100 INJECTION SUBCUTANEOUS at 09:00

## 2019-06-07 RX ADMIN — SODIUM CHLORIDE: 9 INJECTION, SOLUTION INTRAVENOUS at 15:19

## 2019-06-07 RX ADMIN — KETOROLAC TROMETHAMINE 15 MG: 30 INJECTION, SOLUTION INTRAMUSCULAR; INTRAVENOUS at 00:41

## 2019-06-07 RX ADMIN — KETAMINE HYDROCHLORIDE 25 MG: 10 INJECTION, SOLUTION INTRAMUSCULAR; INTRAVENOUS at 15:18

## 2019-06-07 RX ADMIN — ONDANSETRON 4 MG: 2 INJECTION INTRAMUSCULAR; INTRAVENOUS at 17:15

## 2019-06-07 RX ADMIN — KETOROLAC TROMETHAMINE 15 MG: 30 INJECTION, SOLUTION INTRAMUSCULAR; INTRAVENOUS at 20:57

## 2019-06-07 RX ADMIN — DIAZEPAM 5 MG: 5 INJECTION, SOLUTION INTRAMUSCULAR; INTRAVENOUS at 00:48

## 2019-06-07 RX ADMIN — KETOROLAC TROMETHAMINE 15 MG: 30 INJECTION, SOLUTION INTRAMUSCULAR; INTRAVENOUS at 10:07

## 2019-06-07 ASSESSMENT — ENCOUNTER SYMPTOMS
NAUSEA: 1
NERVOUS/ANXIOUS: 1
HEADACHES: 1
MYALGIAS: 1
PHOTOPHOBIA: 1
VOMITING: 1
ABDOMINAL PAIN: 1

## 2019-06-07 NOTE — PROGRESS NOTES
Report received on this patient. She is rambling on in conversation. Patient is still requesting dilaudid and refusing all oral medications.

## 2019-06-07 NOTE — PROGRESS NOTES
Spiritual Care Note    Patient Information     Patient's Name: Jana Overton   MRN: [id number]    YOB: 1955   Age and Gender: 63 y.o. female   Service Area:    Room (and Bed): [place] [address]   Ethnicity or Nationality: White    Primary Language: English   Worship/Spiritual preference: Tenriism   Place of Residence:    Family/Friends/Others Present: Sister present   Clinical Team Present:    Medical Diagnosis(-es)/Procedure(s):    Code Status: Full Code    Date of Admission: 6/1/2019   Length of Stay: 4 days        Spiritual Care Provider Information:  Name of Spiritual Care Provider: Du  Title of Spiritual Care Provider:   Phone Number: [phone]  E-mail: [email or web address]  Total time : 20 minutes    Spiritual Screen Results:    Gen Nursing  Spiritual Screen  Is your spiritual health or inner well-being important to you as you cope with your medical condition?: Yes  Would you like to receive a visit from our Spiritual Care team or your own Buddhism or spiritual leader?: Yes  Was spiritual care education provided to the patient?: Yes     Palliative Care  PC Worship/Spiritual Screening  Was spiritual care education provided to the patient?: Yes      Encounter/Request Information  Encounter/Request Type   Visited With: Patient and family together  Nature of the Visit: Initial, On shift  Continue Visiting: Yes  General Visit: Yes  Referral From/ Origin of Request: Epic nursing    Religous Needs/Values  Worship Needs Visit  Worship Needs: Prayer    Spiritual Assessment   Spiritual Care Encounters  Observations/Symptoms: Accepting, Confusion  Interacton/Conversation:  (Patient shared some of her recent experiences and problems.)  Assessment: Distress  Distress: Coping  Intended Effects: Convey a Calming Presence  Outcomes: Emotional Release  Plan: Visit Upon Request    Notes:

## 2019-06-07 NOTE — DISCHARGE PLANNING
Agency/Facility Name: [facility]  Spoke To: Dayana  Outcome: Patient's appeal was declined and will be responsible for bill by 6/8 @ 1200.

## 2019-06-07 NOTE — PROGRESS NOTES
"Patient given PRN Toradol @[phone]. [phone] Patient requesting pain medications. Patient educated on next time toradol available. Patient stated they wanted dilaudid. Notified patient of provider change their IV dilaudid to oral oxycodone to start regimen to safely transition to oral meds for control at home. Patient got upset at not being able to get dilaudid. Patient refused offer of oral oxycodone. Patient stated would give them \"sores\" in their mouth.   Patient stated since they could not have dilaudid \" I guess I will just have to sit here in pain.\" Patient offered non pharmacological alternatives. Patient repositioned in bed. Given pillows and hot packs. Patient educated on pain regimen. Patient denied further needs. Will continue to monitor.   "
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
## 2019-06-07 NOTE — PROGRESS NOTES
"Patient told RN they could not eat because they were \"NPO\" and they \"failed their ST eval.\" ST Larry notified and to bedside to educate patient on diet.   Patient stated that since they had an IV \"I shouldn't have to have any oral medications.\"    "

## 2019-06-07 NOTE — PROGRESS NOTES
NAD noted this shift. VSS. Patient irritable and anxious throughout whole shift. Multiple demands and complaints from patient. Patient very meticulous with requests made  of staff. Patient manic. Speech hurried and tangential. Patient resting in bed. NAD noted. Report given to oncoming RN.

## 2019-06-08 VITALS
WEIGHT: 151.01 LBS | OXYGEN SATURATION: 99 % | BODY MASS INDEX: 20.45 KG/M2 | TEMPERATURE: 98.2 F | SYSTOLIC BLOOD PRESSURE: 158 MMHG | RESPIRATION RATE: 17 BRPM | HEIGHT: 72 IN | HEART RATE: 105 BPM | DIASTOLIC BLOOD PRESSURE: 80 MMHG

## 2019-06-08 PROCEDURE — 99239 HOSP IP/OBS DSCHRG MGMT >30: CPT | Performed by: INTERNAL MEDICINE

## 2019-06-08 PROCEDURE — 700111 HCHG RX REV CODE 636 W/ 250 OVERRIDE (IP): Performed by: INTERNAL MEDICINE

## 2019-06-08 PROCEDURE — 700111 HCHG RX REV CODE 636 W/ 250 OVERRIDE (IP): Performed by: HOSPITALIST

## 2019-06-08 RX ADMIN — ONDANSETRON 4 MG: 2 INJECTION INTRAMUSCULAR; INTRAVENOUS at 00:35

## 2019-06-08 RX ADMIN — DIAZEPAM 5 MG: 5 INJECTION, SOLUTION INTRAMUSCULAR; INTRAVENOUS at 00:40

## 2019-06-08 RX ADMIN — ENOXAPARIN SODIUM 40 MG: 100 INJECTION SUBCUTANEOUS at 05:02

## 2019-06-08 NOTE — PROGRESS NOTES
Pt was very ademant on not getting D/C'd. MD is aware and pt is medically cleared. Pt appealed D/C decision Thursday and the appeal was denied. Pt initially refused letting anyone else but VAT to remove IV. Home medication was delivered to pt. Pt's sister came to receive pt after D/C. Security was evenually called s/d pt still refusing to leave.  explained everything to pt and pt was willing to let this RN remove PIV. Pt was then transported by security and sister via wheelchair. AVS was printed out and given to pt.

## 2019-06-08 NOTE — PROGRESS NOTES
"Pt wanted to ambulate at midnight. Pt was very upset because pt was aware that her appeal was denied, stated \"I'm going to appeal my appeal. I'm so mad at Dr. Eason, I thought she was on my side, she just came here and didn't tell me anything about my appeal and she thinks she can still be my doctor.\"  Once in the hallway pt said she wanted to power walk, pt had FWW, this RN would trail next to pt with IV pump and pole. Pt started to jog down the hallway, was not using FWW, was carrying FWW. Pt jogged to the end of the hallway. Pt paused and jogged back to her room, asked pt to slow down because IV pole wheels were getting stuck. Pt went around the unit twice, completely steady.   "

## 2019-06-08 NOTE — DISCHARGE SUMMARY
"Discharge Summary    CHIEF COMPLAINT ON ADMISSION  Chief Complaint   Patient presents with   • Migraine   • Pain     \"from head to toe, I'm in a Crohn's flare also\"       Reason for Admission  Abdominal Pain     Admission Date  6/1/2019    CODE STATUS  Prior    HPI & HOSPITAL COURSE  This is a 63 y.o. female here with COPD, Crohn's s/p colostomy, dysphagia followed by GI, chronic pain dependent on opioids, who presented with N/V and says she is here for ostomy revision per Dr. Cruz. She admitted for dehydration and mild FLORES. CT A/P was unremarkable. Dr. Cruz's office was contacted and on-call Dr. Cook says there is no pending surgical plan for the patient.  Dr. Hart with GI was consulted, who is familiar with patient, recommended outpatient follow-up for esophageal manometry as well as speech evaluation.  Speech therapy recommended dysphagia 1, thin liquids however patient requested for nectar thick liquids.  She had no vomiting while hospitalized and her renal function returned to normal. She exhibited paranoia and kely and psych was consulted. She was given IV depacon since she refused pills with no change in her mood. Patient continued to exhibit no vomiting and tolerating her meals. Patient also complained of back pain, has h/o of back surgeries with Dr. Monahan. She had no focal neuro deficits and was ambulating well with nursing staff. CT A/P and XR L-spine did not show acute changes. I offered her oral medications to help with her pain, but she requested only IV. I discussed with psychiatry and patient was discharged. Patient appealed her discharge and was discharged on 6/8/19.    Therefore, she is discharged in good and stable condition to home with close outpatient follow-up.    The patient met 2-midnight criteria for an inpatient stay at the time of discharge.    Discharge Date  6/8/2019    FOLLOW UP ITEMS POST DISCHARGE  F/u with PCP and GI clinic    DISCHARGE DIAGNOSES  Principal Problem:    " Anxiety disorder due to multiple medical problems POA: Yes  Active Problems:    Crohn's disease of small intestine with complication (HCC) POA: Yes      Overview: Followed by GI Dr. Cruz      S/p ileostomy      Scope 5/2014-no strictures, fistulas, or new abscesses    Chronic pain POA: Yes      Overview: On multiple narcotics including high dose oxycodone and Dilaudid      Valium also on medication list    Paroxysmal atrial fibrillation (HCC) POA: Yes    Ileostomy in place (HCC) POA: Yes    COPD (chronic obstructive pulmonary disease) (HCC) POA: Yes      Overview: On oxygen at night    Hypertension POA: Yes    Migraine POA: Yes  Resolved Problems:    Intractable cyclical vomiting with nausea POA: Yes    Hyponatremia POA: Yes    Acute kidney injury (HCC) POA: Yes    Leg pain POA: Yes      FOLLOW UP  Swati Cavanaugh M.D.  [address]  [facility] [address]  [phone]      The hospital  left a voicemail with the office to call you directly to schedule a follow up appointment. If you do not receive a call within 2 days please call to arrange an appointment.       MEDICATIONS ON DISCHARGE     Medication List      CONTINUE taking these medications      Instructions   albuterol 108 (90 BASE) MCG/ACT Aers  Commonly known as:  VENTOLIN or PROVENTIL   Inhale 2 Puffs by mouth every 6 hours as needed.  Dose:  2 Puff     amLODIPine 5 MG Tabs  Commonly known as:  NORVASC   Take 5 mg by mouth every day.  Dose:  5 mg     aspirin 81 MG Chew chewable tablet  Commonly known as:  ASA   Take 81 mg by mouth every day.  Dose:  81 mg     diazePAM 5 MG Tabs  Commonly known as:  VALIUM   Take 2.5-5 mg by mouth every 6 hours as needed for Anxiety.  Dose:  2.5-5 mg     hydrALAZINE 10 MG Tabs  Commonly known as:  APRESOLINE   Take 10 mg by mouth 3 times a day as needed (High BP). For SBP >155  Dose:  10 mg     metoprolol 25 MG Tabs  Commonly known as:  LOPRESSOR   Take 50 mg by mouth 2 times a day. Hold if BP <95/50  Dose:   50 mg     oxyCODONE 20 MG/ML Conc   Take 50-80 mg by mouth every four hours as needed (Pain). 20mg/ml solution  Dose:  50-80 mg     prochlorperazine 10 MG Tabs  Commonly known as:  COMPAZINE   Take 10 mg by mouth every 6 hours as needed for Nausea/Vomiting.  Dose:  10 mg     spironolactone 25 MG Tabs  Commonly known as:  ALDACTONE   Take 50 mg by mouth 2 Times a Day.  Dose:  50 mg            Allergies  Allergies   Allergen Reactions   • Azathioprine Sodium Hives   • Carafate [Sucralfate] Rash     Generalizes/Mouth Sores   • Infliximab Hives   • Morphine Swelling   • Roxanol [Morphine Sulfate] Swelling     Throat swells   • Amitriptyline Unspecified     Nightmares     • Demerol Vomiting   • Fentanyl Unspecified     Patches caused skin breakdown, no pain relief   • Lorazepam Unspecified     States give me nightmares.    • Methadone    • Methotrexate Hives and Rash   • Pregabalin Rash     Rash     • Pseudoephedrine Vomiting   • Sulfa Drugs Hives   • Tape Rash     Skin peels off; paper tape   • Lyrica    • Promethazine    • Celestone [Betamethasone Sodium Phosphate] Unspecified     Pt unable to remember   • Sulfasalazine Unspecified     Pt unable to remember   • Tizanidine Hydrochloride Unspecified     Pt unable to remember       DIET  No orders of the defined types were placed in this encounter.      ACTIVITY  As tolerated.  Weight bearing as tolerated    CONSULTATIONS  Psych  GI    PROCEDURES  DX-LUMBAR SPINE-2 OR 3 VIEWS   Final Result         1.  No acute fracture.      2.  Minimal retrolisthesis at L5-S1.      3.  Status post fusion at L4-5 with removal of hardware.      4.  Mild degenerative disc disease at L3-4.      IR-US GUIDED PIV   Final Result    Ultrasound-guided PERIPHERAL IV INSERTION performed by    qualified nursing staff as above.            IR-US GUIDED PIV   Final Result    Ultrasound-guided PERIPHERAL IV INSERTION performed by    qualified nursing staff as above.            IR-US GUIDED PIV   Final  Result    Ultrasound-guided PERIPHERAL IV INSERTION performed by    qualified nursing staff as above.            DX-CHEST-PORTABLE (1 VIEW)   Final Result         1. No acute cardiopulmonary abnormalities are identified.      CT-ABDOMEN-PELVIS W/O   Final Result         1. No evidence of acute inflammatory change.  The study is however limited due to nonuse of intravenous contrast.      2. No CT evidence of small bowel obstruction. Left lower quadrant ostomy.         US-EXTREMITY VENOUS LOWER BILAT   Final Result      DX-ABDOMEN COMPLETE WITH AP OR PA CXR   Final Result         1. No acute abnormality detected.            LABORATORY  Lab Results   Component Value Date    SODIUM 141 06/06/2019    POTASSIUM 3.9 06/06/2019    CHLORIDE 110 06/06/2019    CO2 22 06/06/2019    GLUCOSE 85 06/06/2019    BUN 13 06/06/2019    CREATININE 0.75 06/06/2019    CREATININE 0.89 02/10/2010    GLOMRATE >59 02/10/2010        Lab Results   Component Value Date    WBC 8.1 06/03/2019    WBC 7.9 02/10/2010    HEMOGLOBIN 13.6 06/03/2019    HEMATOCRIT 42.1 06/03/2019    PLATELETCT 275 06/03/2019        Total time of the discharge process exceeds 35 minutes.

## 2019-06-08 NOTE — PROGRESS NOTES
"Rec'd report from day shift RN. Assumed pt care. Assessment completed. AA&OX4, does not make sense in conversation, rambles. Assisted pt to the bathroom, 1 assist, slow steady gait. Pt emptied colostomy, cleaned perineal area. Assisted pt back to bed. Pt wanted \"all meds that I can have, everything for pain, for my heart, zofran.\"  Went to get pt meds from [facility], when came back to pt pt was very lethargic, asleep, arouses to voice. Medicated pt with IV Toradol. Wasted IV Valium in [facility]. Bed in lowest position, bed locked, bed alarm on for safety, RN and CNA numbers provided, call light within reach.   "

## 2019-06-10 ENCOUNTER — TELEPHONE (OUTPATIENT)
Dept: CARDIOLOGY | Facility: MEDICAL CENTER | Age: 64
End: 2019-06-10

## 2019-06-10 NOTE — TELEPHONE ENCOUNTER
KATLYN Irene/Amanda       Patient has questions about her medication changes. She will only be home for another hour. She can be reached at [phone] or [phone].        Called pt, s/w  (Goyo), per  pt is recently in the hospital for Abd pain/Chron's flare up and dehydration, they are unsure if they would like Dr Monahan for pt to cont taking Amlodipine 5mg PO daily,  reports pt only taking Metoprolol 50mg BID and Spironolactone 25mg BID,  reports pt's /81 and also pt have PRN Hydralazine that she could take as prescribed by Dr Monahan. Informed  that as long as pt's BP remains stable, cont on Metoprolol and Spironolactone for now until we hear back from Dr Monahan if pt should take Amlodipine 5mg daily,  verbalizes understanding and appreciative of the call.     To Dr Monahan

## 2019-06-11 RX ORDER — SPIRONOLACTONE 25 MG/1
25 TABLET ORAL 2 TIMES DAILY
COMMUNITY
Start: 2019-06-11 | End: 2020-04-20 | Stop reason: SDUPTHER

## 2019-07-18 ENCOUNTER — NON-PROVIDER VISIT (OUTPATIENT)
Dept: WOUND CARE | Facility: MEDICAL CENTER | Age: 64
End: 2019-07-18
Attending: COLON & RECTAL SURGERY
Payer: MEDICARE

## 2019-07-18 PROCEDURE — 99211 OFF/OP EST MAY X REQ PHY/QHP: CPT

## 2019-07-18 NOTE — CERTIFICATION
"[facility]  [address]  [place] [address]  [phone] Fax#    Initial Ostomy Evaluation  For 90 Day Certification Period:  07/18/2018    Referring Provider:  Kevin Dale MD  Primary Provider:Dr. Swati Cavanaugh MD  Consulting Providers:  Surgeon:Dr. Cruz      Start of Care:07/18/2019  Reason for referral:leaking ileostomy.      SUBJECTIVE:    HPI: Pt is a 63 year old lady with a hx of Crohn's disease who underwent proctocolectomy and end ileostomy by Dr. Cruz 20 years ago. Lately, she has been having issues with the appliance leaking, she is not sure why. May be associated in a change in weight. She is referred by Dr. Cruz for assessment. Pt is noted today to have a somewhat strange affect; she appears to be very nervous and somewhat agitated at the beginning of the appointment. She became more agitated as the appointment continued, and started telling myself and Amandeep Hearn RN about her and he spouse getting \"arrested\" at [facility] and \"hauled off by security\" the last time she was here as an in patient, and how they now have a law suit in progress, and that she is \"so nervous and anxious any time I am at [facility]\" and \"can you just hurry up I need to get out of here, I need to get out of [facility]\". I aplologized to her for her bad experience at [facility], asked if there was anything I could do to help or if she would like to speak to a manager. She refused, saying \"no, just hurry up with what you are doing so I can get out of here\". I assessed stoma and the area that has been leaking, and told pt she would likely benefit from convexity in her flange to create a better seal. She is currently using Convatec appliance. I told her that at [facility] we have [place] products and do not stock Convatec. She replied \"I hate [place] products, they don't work for me\". I apologized saying that the only way I will be able to help her today would be by " "placing out in stock Jasmyne ostomy appliances. I told her we could do this, then if the added convexity and belt worked to stop the leaking, then next appointment we could look at supply catalogue to see if there is a similar and corresponding [facility] appliance that would possibly work the same way, and then place an order for some samples of this from [facility] rep. She finally agreed to use the [place] appliance today. I performed care, fitted her with a convex [place] flange and pouch and an ostomy belt. She then became agitated again, saying she didn't like the appliance, felt that it was not going to work. She then got up quickly saying \"I just have to get out of here, I have to get out of [facility], it's giving me anxiety!\". I gave her a green appointment sheet to give to ALISSA. Pt left.     Past Medical Hx:  Surgical Hx:  Past Surgical History:   Procedure Laterality Date   • GASTROSCOPY N/A 5/22/2019    Procedure: GASTROSCOPY - WITH DILATION;  Surgeon: Dionicio Cardona M.D.;  Location: [facility];  Service: Gastroenterology   • GASTROSCOPY  1/6/2019    Procedure: GASTROSCOPY- WITH DILATION;  Surgeon: Daniel Daniels M.D.;  Location: [facility];  Service: Gastroenterology   • ILEOSTOMY  12/29/2018    Procedure: ILEOSTOMY- REVISION;  Surgeon: Kevin Cruz M.D.;  Location: [facility];  Service: General   • SIGMOIDOSCOPY FLEX  12/29/2018    Procedure: SIGMOIDOSCOPY FLEX;  Surgeon: Kevin Cruz M.D.;  Location: [facility];  Service: General   • EXPLORATORY LAPAROTOMY  12/29/2018    Procedure: EXPLORATORY LAPAROTOMY, lysis of adhesions;  Surgeon: Kevin Cruz M.D.;  Location: [facility];  Service: General   • ILEOSTOMY  5/14/2014    Performed by Kevin Cruz M.D. at [facility]   • MAMMOPLASTY REDUCTION  7/17/2013    Performed by Janey Burt M.D. at [facility]   • HARDWARE REMOVAL NEURO  7/16/2012    " Performed by KEELEY KIM at SURGERY [facility] ORS   • GASTROSCOPY  10/4/2011    Performed by TYSON MARTINEZ at SURGERY SAME DAY [facility] ORS   • COLONOSCOPY  10/4/2011    Performed by TYSON MARTINEZ at SURGERY SAME DAY [facility] ORS   • DILATION AND CURETTAGE  9/24/2010    Performed by GAURAV ALY at SURGERY SAME DAY [facility] ORS   • ILEOSTOMY  11/11/2009    Performed by SYDNEE AUGUSTINE at SURGERY [facility] ORS   • GASTROSCOPY WITH BIOPSY  11/22/08    Performed by NEGRITA HUYNH at SURGERY [facility] ORS   • ABDOMINAL EXPLORATION     • CERVICAL DISK AND FUSION ANTERIOR     • COLON RESECTION     • FOOT SURGERY     • HAND SURGERY     • LUMBAR FUSION ANTERIOR     • LUMPECTOMY     • OTHER ABDOMINAL SURGERY      surgery for chrons disease   • PB REDUCTION OF LARGE BREAST       Medications:  Current Outpatient Prescriptions:   •  spironolactone (ALDACTONE) 25 MG Tab, Take 1 Tab by mouth 2 Times a Day., Disp: , Rfl:   •  hydrALAZINE (APRESOLINE) 10 MG Tab, Take 10 mg by mouth 3 times a day as needed (High BP). For SBP >155, Disp: , Rfl:   •  prochlorperazine (COMPAZINE) 10 MG Tab, Take 10 mg by mouth every 6 hours as needed for Nausea/Vomiting., Disp: , Rfl:   •  diazePAM (VALIUM) 5 MG Tab, Take 2.5-5 mg by mouth every 6 hours as needed for Anxiety., Disp: , Rfl:   •  aspirin (ASA) 81 MG Chew Tab chewable tablet, Take 81 mg by mouth every day., Disp: , Rfl:   •  albuterol (VENTOLIN OR PROVENTIL) 108 (90 BASE) MCG/ACT Aero Soln, Inhale 2 Puffs by mouth every 6 hours as needed., Disp: , Rfl:   •  metoprolol (LOPRESSOR) 25 MG Tab, Take 50 mg by mouth 2 times a day. Hold if BP <95/50, Disp: , Rfl:   •  oxycodone 20 MG/ML CONC, Take 50-80 mg by mouth every four hours as needed (Pain). 20mg/ml solution, Disp: , Rfl:   Allergies:Azathioprine sodium; Carafate [sucralfate]; Infliximab; Morphine; Roxanol [morphine sulfate]; Amitriptyline; Demerol; Fentanyl; Lorazepam; Methadone; Methotrexate; Pregabalin;  Pseudoephedrine; Sulfa drugs; Tape; Lyrica; Promethazine; Celestone [betamethasone sodium phosphate]; Sulfasalazine; and Tizanidine hydrochloride    Social Hx:  Social History     Social History   • Marital status:      Spouse name: N/A   • Number of children: N/A   • Years of education: N/A     Occupational History   • Not on file.     Social History Main Topics   • Smoking status: Never Smoker   • Smokeless tobacco: Never Used      Comment: second hand smoke   • Alcohol use Yes   • Drug use: Yes      Comment: medical marijuana   • Sexual activity: Not on file      Comment:      Other Topics Concern   • Not on file     Social History Narrative   • No narrative on file         OBJECTIVE:     STOMA ASSESSMENT:   Type:  _x end___Ileostomy     ____Colostomy    ____Urostomy    ____Other     Location:  LLQ    Size:1 1/4 - 1 3/8   Shape:round, slightly oval   Color:red, moist   Protrudes:      __x_ >1 inch               ___ <1 inch   [place]:  Central and patent   Mucocutaneous Junction:  Intact circumferentially   Skin indentations, creases or scar tissue:crease at 3 o'clock when pt sits (likely responsible for the leaking as current flange is seen to be leaking here when I removed it).    Samreen-stomal Skin Problems:minor denudation from 5-7 o'clock - crusted with stoma powder and covered with a piece of Brava strip   Effluent / Flatus:liquid, green/brown, appropriate for ileostomy   Photo  __x__ Yes - (Note: picture was lost off camera when camera failed to upload).  Pouching Procedure:   Old appliance removed.    Peristomal skin cleansed with:warm water and washcloth   Peristomal skin treated with: [place] medical adhesive spray   Ostomy appliance used:  Jasmyne  2 piece 57mm flat CTF flange with a 30mm convex barrier ring, matching 57mm clear pouch, Brava strip to R side, stoma belt.      Patient Education:   Verbal/demonstration instructions of above pouching procedure provided to patient.  Change   "ileostomies every 3-5 days. Change appliance immediately if it is leaking or peristomal skin feels irritated, has itching, or burning.   To change the appliance, remove previous appliance, cleanse peristomal skin with warm water/washcloth, pat dry, make an ostomy template or use cardboard measuring guide and trace ostomy shape onto back of barrier, cut out barrier, apply a paste ring around barrier opening and apply appliance. Empty pouches when no more than ½ full. Check contents every 2 hours or as needed. Do not leave soap residue on tissue and do not use baby wipes or skin prep wipes.   Should you experience any significant changes in your condition, such as infection (redness, swelling, localized heat, increased pain, fever > 101 F, chills) or have any questions regarding your home care instructions, please contact the wound center at [phone]. If after hours, contact your primary care physician or go to the hospital emergency room. I instr pt to return in a week to assess new POC/appliance      Response: Pt appears to understand instr., although is quite agitated throughout appointment, and is perseverating on her bad experience at [employer] in the past, the fact that she was \"arrested and hauled off by security\", and that she has a law suit going on with [employer] currently. She refuses to speak with a manager despite my offer for her to do so.       PLAN: Return in 1 week    Supplies Needed:   Appliance type:    Jasmyne  2 piece 57mm flat CTF flange with a 30mm convex barrier ring, matching 57mm clear pouch, Brava strip to R side, stoma belt.              Other:      Accessories:         Supplier:    Frequency:  weekly      Professional Collaboration:  Evaluation notes forwarded to referring provider.      At the time of each visit, a thorough assessment of the patient is completed to assure appropriateness of our plan of care.  The plan of care may need to be adapted from the original plan of care to " address any significant changes in patient status.    Clinician Signature:  _________________________________  Date:  ____________    Physician Signature:  ________________________________  Date:  ____________

## 2019-07-19 NOTE — PATIENT INSTRUCTIONS
Change urostomies and ileostomies every 3-5 days. Change colostomies every 5-7 days. Change appliance immediately if it is leaking or peristomal skin feels irritated, has itching, or  burning.   To change the appliance, remove previous appliance, cleanse peristomal skin with warm water/washcloth, pat dry, make an ostomy template or use cardboard measuring guide and trace ostomy shape onto back of barrier, cut out barrier, apply a paste ring around barrier opening and apply appliance. Empty pouches when no more than ½ full. Check contents every 2 hours or as needed. Do not leave soap residue on tissue and do not use baby wipes or skin prep wipes.     Should you experience any significant changes in your condition, such as infection (redness, swelling, localized heat, increased pain, fever > 101 F, chills) or have any questions regarding your home care instructions, please contact the wound center at [phone]. If after hours, contact your primary care physician or go to the hospital emergency room.

## 2019-07-31 DIAGNOSIS — I10 ESSENTIAL HYPERTENSION: Primary | ICD-10-CM

## 2019-08-01 RX ORDER — METOPROLOL TARTRATE 50 MG/1
50 TABLET, FILM COATED ORAL 2 TIMES DAILY
Qty: 180 TAB | Refills: 3 | Status: SHIPPED | OUTPATIENT
Start: 2019-08-01 | End: 2020-08-25

## 2019-08-30 ENCOUNTER — APPOINTMENT (RX ONLY)
Dept: URBAN - METROPOLITAN AREA CLINIC 20 | Facility: CLINIC | Age: 64
Setting detail: DERMATOLOGY
End: 2019-08-30

## 2019-08-30 DIAGNOSIS — L21.8 OTHER SEBORRHEIC DERMATITIS: ICD-10-CM

## 2019-08-30 DIAGNOSIS — D18.0 HEMANGIOMA: ICD-10-CM

## 2019-08-30 DIAGNOSIS — L85.3 XEROSIS CUTIS: ICD-10-CM

## 2019-08-30 DIAGNOSIS — L72.0 EPIDERMAL CYST: ICD-10-CM

## 2019-08-30 DIAGNOSIS — L57.0 ACTINIC KERATOSIS: ICD-10-CM

## 2019-08-30 DIAGNOSIS — D22 MELANOCYTIC NEVI: ICD-10-CM

## 2019-08-30 DIAGNOSIS — Z85.828 PERSONAL HISTORY OF OTHER MALIGNANT NEOPLASM OF SKIN: ICD-10-CM

## 2019-08-30 DIAGNOSIS — Z71.89 OTHER SPECIFIED COUNSELING: ICD-10-CM

## 2019-08-30 DIAGNOSIS — L82.1 OTHER SEBORRHEIC KERATOSIS: ICD-10-CM

## 2019-08-30 DIAGNOSIS — L81.4 OTHER MELANIN HYPERPIGMENTATION: ICD-10-CM

## 2019-08-30 PROBLEM — D22.61 MELANOCYTIC NEVI OF RIGHT UPPER LIMB, INCLUDING SHOULDER: Status: ACTIVE | Noted: 2019-08-30

## 2019-08-30 PROBLEM — D22.71 MELANOCYTIC NEVI OF RIGHT LOWER LIMB, INCLUDING HIP: Status: ACTIVE | Noted: 2019-08-30

## 2019-08-30 PROBLEM — D23.5 OTHER BENIGN NEOPLASM OF SKIN OF TRUNK: Status: ACTIVE | Noted: 2019-08-30

## 2019-08-30 PROBLEM — D18.01 HEMANGIOMA OF SKIN AND SUBCUTANEOUS TISSUE: Status: ACTIVE | Noted: 2019-08-30

## 2019-08-30 PROBLEM — D22.62 MELANOCYTIC NEVI OF LEFT UPPER LIMB, INCLUDING SHOULDER: Status: ACTIVE | Noted: 2019-08-30

## 2019-08-30 PROBLEM — D22.5 MELANOCYTIC NEVI OF TRUNK: Status: ACTIVE | Noted: 2019-08-30

## 2019-08-30 PROBLEM — D22.39 MELANOCYTIC NEVI OF OTHER PARTS OF FACE: Status: ACTIVE | Noted: 2019-08-30

## 2019-08-30 PROBLEM — D22.72 MELANOCYTIC NEVI OF LEFT LOWER LIMB, INCLUDING HIP: Status: ACTIVE | Noted: 2019-08-30

## 2019-08-30 PROCEDURE — ? ADDITIONAL NOTES

## 2019-08-30 PROCEDURE — ? SUNSCREEN RECOMMENDATIONS

## 2019-08-30 PROCEDURE — 99214 OFFICE O/P EST MOD 30 MIN: CPT

## 2019-08-30 PROCEDURE — ? PRESCRIPTION

## 2019-08-30 PROCEDURE — ? COUNSELING

## 2019-08-30 PROCEDURE — ? PHOTODYNAMIC THERAPY COUNSELING

## 2019-08-30 RX ORDER — AMMONIUM LACTATE 17 G/G
CREAM TOPICAL
Qty: 1 | Refills: 0 | Status: ERX

## 2019-08-30 ASSESSMENT — LOCATION DETAILED DESCRIPTION DERM
LOCATION DETAILED: RIGHT MEDIAL PLANTAR MIDFOOT
LOCATION DETAILED: RIGHT VENTRAL PROXIMAL FOREARM
LOCATION DETAILED: RIGHT CENTRAL FRONTAL SCALP
LOCATION DETAILED: RIGHT PROXIMAL PRETIBIAL REGION
LOCATION DETAILED: LEFT LATERAL PROXIMAL PRETIBIAL REGION
LOCATION DETAILED: LEFT SUPERIOR MEDIAL MALAR CHEEK
LOCATION DETAILED: RIGHT DISTAL POSTERIOR UPPER ARM
LOCATION DETAILED: RIGHT SUPERIOR UPPER BACK
LOCATION DETAILED: RIGHT DISTAL POSTERIOR THIGH
LOCATION DETAILED: INFERIOR THORACIC SPINE
LOCATION DETAILED: LEFT PROXIMAL POSTERIOR UPPER ARM
LOCATION DETAILED: RIGHT INFERIOR CENTRAL MALAR CHEEK
LOCATION DETAILED: LEFT PLANTAR FOREFOOT OVERLYING 3RD METATARSAL
LOCATION DETAILED: RIGHT INFERIOR MEDIAL MIDBACK
LOCATION DETAILED: RIGHT INFERIOR FOREHEAD
LOCATION DETAILED: RIGHT INFERIOR VERMILION LIP
LOCATION DETAILED: LEFT VENTRAL PROXIMAL FOREARM
LOCATION DETAILED: LEFT CENTRAL MALAR CHEEK
LOCATION DETAILED: RIGHT INFERIOR MEDIAL UPPER BACK
LOCATION DETAILED: LEFT DISTAL POSTERIOR THIGH

## 2019-08-30 ASSESSMENT — LOCATION SIMPLE DESCRIPTION DERM
LOCATION SIMPLE: LEFT FOREARM
LOCATION SIMPLE: RIGHT SCALP
LOCATION SIMPLE: LEFT POSTERIOR UPPER ARM
LOCATION SIMPLE: LEFT PRETIBIAL REGION
LOCATION SIMPLE: RIGHT LIP
LOCATION SIMPLE: RIGHT PLANTAR SURFACE
LOCATION SIMPLE: LEFT PLANTAR SURFACE
LOCATION SIMPLE: RIGHT POSTERIOR THIGH
LOCATION SIMPLE: RIGHT FOREARM
LOCATION SIMPLE: LEFT CHEEK
LOCATION SIMPLE: RIGHT UPPER BACK
LOCATION SIMPLE: RIGHT LOWER BACK
LOCATION SIMPLE: RIGHT POSTERIOR UPPER ARM
LOCATION SIMPLE: LEFT POSTERIOR THIGH
LOCATION SIMPLE: RIGHT CHEEK
LOCATION SIMPLE: RIGHT PRETIBIAL REGION
LOCATION SIMPLE: RIGHT FOREHEAD
LOCATION SIMPLE: UPPER BACK

## 2019-08-30 ASSESSMENT — LOCATION ZONE DERM
LOCATION ZONE: SCALP
LOCATION ZONE: FACE
LOCATION ZONE: LIP
LOCATION ZONE: TRUNK
LOCATION ZONE: ARM
LOCATION ZONE: LEG
LOCATION ZONE: FEET

## 2019-08-30 NOTE — PROCEDURE: ADDITIONAL NOTES
Additional Notes: Submitted prior authorization for PDT treatment on the face and hands.
Detail Level: Detailed

## 2019-09-20 DIAGNOSIS — J98.01 BRONCHOSPASM: ICD-10-CM

## 2019-09-20 RX ORDER — ALBUTEROL SULFATE 90 UG/1
2 AEROSOL, METERED RESPIRATORY (INHALATION) EVERY 6 HOURS PRN
Qty: 8.5 G | Refills: 0 | OUTPATIENT
Start: 2019-09-20

## 2019-12-09 ENCOUNTER — APPOINTMENT (RX ONLY)
Dept: URBAN - METROPOLITAN AREA CLINIC 20 | Facility: CLINIC | Age: 64
Setting detail: DERMATOLOGY
End: 2019-12-09

## 2019-12-09 DIAGNOSIS — L57.0 ACTINIC KERATOSIS: ICD-10-CM

## 2019-12-09 DIAGNOSIS — L71.8 OTHER ROSACEA: ICD-10-CM

## 2019-12-09 DIAGNOSIS — Z85.828 PERSONAL HISTORY OF OTHER MALIGNANT NEOPLASM OF SKIN: ICD-10-CM

## 2019-12-09 PROCEDURE — ? LIQUID NITROGEN

## 2019-12-09 PROCEDURE — ? ADDITIONAL NOTES

## 2019-12-09 PROCEDURE — 17003 DESTRUCT PREMALG LES 2-14: CPT

## 2019-12-09 PROCEDURE — ? COUNSELING

## 2019-12-09 PROCEDURE — 99213 OFFICE O/P EST LOW 20 MIN: CPT | Mod: 25

## 2019-12-09 PROCEDURE — 17000 DESTRUCT PREMALG LESION: CPT

## 2019-12-09 PROCEDURE — ? PRESCRIPTION

## 2019-12-09 RX ORDER — AZELAIC ACID 0.15 G/G
GEL TOPICAL
Qty: 1 | Refills: 6 | Status: ERX

## 2019-12-09 ASSESSMENT — LOCATION SIMPLE DESCRIPTION DERM
LOCATION SIMPLE: LEFT HAND
LOCATION SIMPLE: LEFT CHEEK
LOCATION SIMPLE: RIGHT HAND
LOCATION SIMPLE: RIGHT CHEEK

## 2019-12-09 ASSESSMENT — LOCATION DETAILED DESCRIPTION DERM
LOCATION DETAILED: LEFT INFERIOR MEDIAL MALAR CHEEK
LOCATION DETAILED: LEFT RADIAL DORSAL HAND
LOCATION DETAILED: LEFT INFERIOR CENTRAL MALAR CHEEK
LOCATION DETAILED: RIGHT MEDIAL MALAR CHEEK
LOCATION DETAILED: RIGHT INFERIOR CENTRAL MALAR CHEEK
LOCATION DETAILED: RIGHT ULNAR DORSAL HAND
LOCATION DETAILED: RIGHT LATERAL MALAR CHEEK
LOCATION DETAILED: LEFT SUPERIOR MEDIAL MALAR CHEEK

## 2019-12-09 ASSESSMENT — LOCATION ZONE DERM
LOCATION ZONE: FACE
LOCATION ZONE: HAND

## 2019-12-09 NOTE — PROCEDURE: LIQUID NITROGEN
Post-Care Instructions: I reviewed with the patient in detail post-care instructions. Patient is to wear sunprotection, and avoid picking at any of the treated lesions. Pt may apply Vaseline to crusted or scabbing areas.
Number Of Freeze-Thaw Cycles: 2 freeze-thaw cycles
Render Post-Care Instructions In Note?: yes
Detail Level: Detailed
Consent: The patient's consent was obtained including but not limited to risks of crusting, scabbing, blistering, scarring, darker or lighter pigmentary change, recurrence, incomplete removal and infection. RTC in 2 months if lesion(s) persistent.
Render Note In Bullet Format When Appropriate: No
Duration Of Freeze Thaw-Cycle (Seconds): 10

## 2019-12-09 NOTE — PROCEDURE: ADDITIONAL NOTES
Additional Notes: Per Patient, she is scheduled for PDT treatments in the spring. Patient needs face and lips treated.
Detail Level: Detailed
Additional Notes: Patient spoke with Yogi regarding different laser treatments.

## 2019-12-10 ENCOUNTER — APPOINTMENT (RX ONLY)
Dept: URBAN - METROPOLITAN AREA CLINIC 20 | Facility: CLINIC | Age: 64
Setting detail: DERMATOLOGY
End: 2019-12-10

## 2019-12-10 DIAGNOSIS — Z41.9 ENCOUNTER FOR PROCEDURE FOR PURPOSES OTHER THAN REMEDYING HEALTH STATE, UNSPECIFIED: ICD-10-CM

## 2019-12-10 PROCEDURE — ? SCITON BBL

## 2019-12-10 ASSESSMENT — LOCATION SIMPLE DESCRIPTION DERM
LOCATION SIMPLE: NOSE
LOCATION SIMPLE: RIGHT CHEEK
LOCATION SIMPLE: LEFT CHEEK

## 2019-12-10 ASSESSMENT — LOCATION DETAILED DESCRIPTION DERM
LOCATION DETAILED: LEFT MEDIAL MALAR CHEEK
LOCATION DETAILED: NASAL SUPRATIP
LOCATION DETAILED: RIGHT INFERIOR MEDIAL MALAR CHEEK

## 2019-12-10 ASSESSMENT — LOCATION ZONE DERM
LOCATION ZONE: NOSE
LOCATION ZONE: FACE

## 2019-12-10 NOTE — PROCEDURE: SCITON BBL
Fluence (J/Cm2): 25
Fluence (J/Cm2): 15
Passes: 1
Cooling ?: Yes
Spot Size: Finesse Adapter Size: 15 x 15 mm square
Pulse Duration: 20
Passes: 2
Price (Use Numbers Only, No Special Characters Or $): 116.00
Pulse Duration Units: milliseconds
Consent: Written consent obtained, risks reviewed including but not limited to crusting, scabbing, blistering, scarring, darker or lighter pigmentary change, bruising, and/or incomplete response.
Total Area (Optional- Include Units): mid face
Post-Care Instructions: I reviewed with the patient in detail post-care instructions. Patient should stay away from the sun and wear sun protection until treated areas are fully healed.
Anesthesia Volume In Cc: 0
Hide Repetition Rate?: No
Preprocedure Text: The treatment areas were thoroughly cleaned. Any exposed at risk hair that was not to be treated was covered in white paper tape. Clear ultrasound gel was applied to the treatment area. The area was treated with no immediate stacking of pulses.
Fluence (J/Cm2): 17
Pulse Duration: 10
Post Procedure Text: The patient tolerated the procedure well. Ice-chilled washclothes were applied to the treatment area for comfort. Post care was reviewed with the patient.
Detail Level: Zone

## 2019-12-14 ENCOUNTER — RX ONLY (OUTPATIENT)
Age: 64
Setting detail: RX ONLY
End: 2019-12-14

## 2019-12-14 RX ORDER — METRONIDAZOLE 10 MG/G
GEL TOPICAL
Qty: 1 | Refills: 6 | Status: ERX | COMMUNITY
Start: 2019-12-14

## 2020-01-13 ENCOUNTER — RX ONLY (OUTPATIENT)
Age: 65
Setting detail: RX ONLY
End: 2020-01-13

## 2020-01-13 ENCOUNTER — APPOINTMENT (RX ONLY)
Dept: URBAN - METROPOLITAN AREA CLINIC 20 | Facility: CLINIC | Age: 65
Setting detail: DERMATOLOGY
End: 2020-01-13

## 2020-01-13 DIAGNOSIS — Z41.9 ENCOUNTER FOR PROCEDURE FOR PURPOSES OTHER THAN REMEDYING HEALTH STATE, UNSPECIFIED: ICD-10-CM

## 2020-01-13 PROCEDURE — ? ADDITIONAL NOTES

## 2020-01-13 NOTE — PROCEDURE: ADDITIONAL NOTES
Detail Level: Simple
Additional Notes: Came in for a BBL but her skin is red and inflamed. Had Siena pop in to check on her skin. She is using metro gel but Siena decided to put her on doxycycline instead due to the persisting breakouts. Did a biocelluouse mask instead of a BBL. Did schedule a BBL for three weeks when her skin has calmed down a bit.

## 2020-01-22 ENCOUNTER — RX ONLY (OUTPATIENT)
Age: 65
Setting detail: RX ONLY
End: 2020-01-22

## 2020-01-22 RX ORDER — METRONIDAZOLE 10 MG/G
GEL TOPICAL
Qty: 1 | Refills: 6 | Status: ERX

## 2020-02-04 ENCOUNTER — APPOINTMENT (RX ONLY)
Dept: URBAN - METROPOLITAN AREA CLINIC 20 | Facility: CLINIC | Age: 65
Setting detail: DERMATOLOGY
End: 2020-02-04

## 2020-02-04 DIAGNOSIS — Z41.9 ENCOUNTER FOR PROCEDURE FOR PURPOSES OTHER THAN REMEDYING HEALTH STATE, UNSPECIFIED: ICD-10-CM

## 2020-02-04 PROCEDURE — ? SCITON BBL

## 2020-02-04 ASSESSMENT — LOCATION SIMPLE DESCRIPTION DERM
LOCATION SIMPLE: RIGHT CHEEK
LOCATION SIMPLE: RIGHT LIP

## 2020-02-04 ASSESSMENT — LOCATION ZONE DERM
LOCATION ZONE: LIP
LOCATION ZONE: FACE

## 2020-02-04 ASSESSMENT — LOCATION DETAILED DESCRIPTION DERM
LOCATION DETAILED: RIGHT INFERIOR CENTRAL MALAR CHEEK
LOCATION DETAILED: RIGHT LOWER CUTANEOUS LIP

## 2020-02-04 NOTE — PROCEDURE: SCITON BBL
Detail Level: Simple
Spot Size: Finesse Adapter Size: 15 x 15 mm square
Pulse Duration: 15
Cooling (In C): 20
Passes: 1
Total Pulses (Optional): 1.0s
Fluence (J/Cm2): 25
Fluence (J/Cm2): 13
Pulse Duration Units: milliseconds
Repetition Rate (Hz): 3
Price (Use Numbers Only, No Special Characters Or $): 350.00
Consent: Written consent obtained, risks reviewed including but not limited to crusting, scabbing, blistering, scarring, darker or lighter pigmentary change, bruising, and/or incomplete response.
Fluence (J/Cm2): 21
Anesthesia Volume In Cc: 0
Pulse Duration: 10
Post-Care Instructions: I reviewed with the patient in detail post-care instructions. Patient should stay away from the sun and wear sun protection until treated areas are fully healed.
Repetition Rate (Hz): 2
Hide Repetition Rate?: No
Preprocedure Text: The treatment areas were thoroughly cleaned. Any exposed at risk hair that was not to be treated was covered in white paper tape. Clear ultrasound gel was applied to the treatment area. The area was treated with no immediate stacking of pulses.
Spot Size: Finesse Adapter Size: 7 mm round
Fluence (J/Cm2): 12
Post Procedure Text: The patient tolerated the procedure well. Ice-chilled washclothes were applied to the treatment area for comfort. Post care was reviewed with the patient.
Additional Comments (Optional): spot treat 11 square forehead
Cooling ?: Yes
Spot Size: Finesse Adapter Size: 15 x 45 mm (No Finesse Adapter)
Fluence (J/Cm2): 14

## 2020-02-15 NOTE — ED NOTES
85 y/o female with HTN, HLD, CHF, a fib, long term anticoagulant use, CAD, CKD3, anemia of chronic disease, tachy tejal syndrome, Pacemaker placement, chronic neck pain, chronic back pain, depression; patient admitted to [facility] on 2/11 with chest pain, back pain, bilateral shoulder pain, nausea and vomiting X 1. Had dry cough, low grade fever in the ED to 100.2. She was admitted and started on vanco and zosyn empirically. BC on 2/11 one of 2 sets positive for GNR - klebsiella pneumoniae sensitive to all. Flu swab on admit negative; Repeat BC on 2/13 NGTD. UA on admit 2/11 was negative except for trace protein. UA on 2/14 was negative except for trace blood. Portable CXR on 2/11 was negative for acute process. Right knee x ray 2/12 positive for subtle cortical irregularity with nonspecific linear lucency seen involving the medial femoral condyle, degenerative changes and no significant joint effusion seen. CT abdomen and pelvis done on 2/14 positive for bilateral small pleural effusions, s/p cholecystectomy, mild prominence of the common bile duct no definite obstruction, sigmoid diverticulosis, some fractures of L1, L2 and L3 (age indeterminate) remote fracture S2 and right femoral neck. 2/14 the vanco and zosyn were changed to cefazolin. ID consulted 2/15 for help with antibiotics and to figure out source for the klebsiella bacteremia.     2/15 Patient is hard of hearing - daughter in room can provide most information. She became sick all of a sudden - daughter states that patient was in her PCP office earlier this week and was not having any problems. Patient had cholecystectomy years ago. Had right hip surgery and pacemaker placement without problems. Patient complaining of diarrhea. Also of nausea and abdominal pain - epigastric and RUQ pain. No complaints of SOB.     2/16 still with abdominal pain today some nausea yesterday; GI evaluation in progress   ERP at the bedside offering this pt options for care, she refused all ideas, refused an IV, stated she thought the ERP doesn't care, she stated that she is recording her visit. Asking that we get an ambulance to return her home. [employer] could not get any information as this pt was uncooperative changing subject, acting hostile.

## 2020-02-25 NOTE — PROGRESS NOTES
Hospital Medicine Daily Progress Note    Date of Service  6/5/2019    Chief Complaint  63 y.o. female admitted 6/1/2019 with N/V.    Hospital Course    PMH COPD, Crohn's s/p colostomy, dysphagia followed by GI, chronic pain dependent on opioids, who presented with N/V and admitted for dehydration and FLORES. Dr. Hart with GI was consulted who is familiar with patient, recommended outpatient follow-up for esophageal manometry as well as speech evaluation.  Speech therapy recommended dysphagia 1, thin liquids however patient requested for nectar thick liquids.  She had no vomiting while hospitalized and her renal function returned to normal. She exhibited paranoia and kely and psych was consulted.          Interval Problem Update  She continues to refuse oral meds. Has not needed hydralazine  Received 2 doses of ativan overnight. Continues to request IV pain meds  She is eating >75% of some meals. No vomiting per nursing, but patient insists she is still vomiting.  Mild hyperchloremia, change NS to LR infusion  She has no improvement in her anxiety. She is tangential when I try to ask about her symptoms, asking to see her Medicare  and going on about her court case.    Consultants/Specialty  Psych  GI    Code Status  Full    Disposition  Pending controlled psychiatric illness    Review of Systems  Review of Systems   Constitutional: Positive for malaise/fatigue.   HENT: Positive for sore throat.    Eyes: Positive for photophobia.   Respiratory: Negative for cough and shortness of breath.    Cardiovascular: Negative for chest pain.   Gastrointestinal: Positive for abdominal pain, nausea and vomiting.   Musculoskeletal: Positive for myalgias.   Neurological: Positive for weakness and headaches.   Psychiatric/Behavioral: The patient is nervous/anxious.         Physical Exam  Temp:  [36.2 °C (97.2 °F)-37.2 °C (99 °F)] 36.3 °C (97.3 °F)  Pulse:  [85-99] 99  Resp:  [17-20] 17  BP: (117-130)/(66-84) 130/84  SpO2:   [94 %-97 %] 97 %    Physical Exam   Constitutional:   Thin, frail   HENT:   Head: Normocephalic.   Mouth/Throat: Oropharynx is clear and moist.   Eyes: Pupils are equal, round, and reactive to light. Conjunctivae are normal.   Cardiovascular: Normal rate and regular rhythm.    Pulmonary/Chest: Effort normal. She has no wheezes. She has no rales.   Abdominal: Soft. Bowel sounds are normal. She exhibits no distension. There is tenderness (diffusely). There is no rebound and no guarding.   Ostomy in place   Musculoskeletal: She exhibits no edema.   Diffuse muscle atrophy   Neurological: She is alert.   Oriented to self and place   Skin: Skin is warm and dry.   Psychiatric: Her mood appears anxious. Her speech is tangential.   Pressured speech   Nursing note and vitals reviewed.      Fluids    Intake/Output Summary (Last 24 hours) at 06/05/19 1126  Last data filed at 06/04/19 1823   Gross per 24 hour   Intake                0 ml   Output              500 ml   Net             -500 ml       Laboratory  Recent Labs      06/03/19   0058   WBC  8.1   RBC  4.59   HEMOGLOBIN  13.6   HEMATOCRIT  42.1   MCV  91.7   MCH  29.6   MCHC  32.3*   RDW  44.2   PLATELETCT  275   MPV  10.3     Recent Labs      06/03/19   0058  06/05/19   0606   SODIUM  137  144   POTASSIUM  3.7  4.0   CHLORIDE  104  114*   CO2  24  20   GLUCOSE  90  88   BUN  13  15   CREATININE  0.89  0.81   CALCIUM  10.0  9.8                   Imaging  IR-US GUIDED PIV   Final Result    Ultrasound-guided PERIPHERAL IV INSERTION performed by    qualified nursing staff as above.            DX-CHEST-PORTABLE (1 VIEW)   Final Result         1. No acute cardiopulmonary abnormalities are identified.      CT-ABDOMEN-PELVIS W/O   Final Result         1. No evidence of acute inflammatory change.  The study is however limited due to nonuse of intravenous contrast.      2. No CT evidence of small bowel obstruction. Left lower quadrant ostomy.         US-EXTREMITY VENOUS LOWER  BILAT   Final Result      DX-ABDOMEN COMPLETE WITH AP OR PA CXR   Final Result         1. No acute abnormality detected.           Assessment/Plan  * Anxiety disorder due to multiple medical problems- (present on admission)   Assessment & Plan    With manic presentation  Psych following  Given dose of IV depacon without much improvement  Continue Valium as needed     Intractable cyclical vomiting with nausea- (present on admission)   Assessment & Plan    -Acute on chronic  -no episodes witnessed this hospitalization, tolerating her meals  -continue anti-emetics as needed     Crohn's disease of small intestine with complication (HCC)- (present on admission)   Assessment & Plan    -Chronic with ileostomy  -Followed by Dr. Cruz - surgery and GI consultants  -She reports feeling like she is in an acute flare  -She does not appear to be an acute flare  -CT shows no acute findings  GI recommends outpatient follow-up     Migraine   Assessment & Plan    Complains of migraine  Refusing all oral medications, only wants IV dilaudid     Leg pain- (present on admission)   Assessment & Plan    -(-) DVT     Hypertension- (present on admission)   Assessment & Plan    -She is refusing oral medications  IV PRN's ordered     COPD (chronic obstructive pulmonary disease) (HCC)- (present on admission)   Assessment & Plan    -Chronic without acute exacerbation     Ileostomy in place (HCC)- (present on admission)   Assessment & Plan    Wound care     Paroxysmal atrial fibrillation (HCC)- (present on admission)   Assessment & Plan    -continue metoprolol  - only on aspirin after discussion with cardiology outpatient     Chronic pain- (present on admission)   Assessment & Plan    -She is not wanting to take her oral home medications.  She is asking for 2 mg IV Dilaudid  -History of drug-seeking behavior  -Reports pain from the top of her head to the tips of her toes, associates it with migraine  -She follows Dr. Telles  -Psych trialed  valproate, has not helped with her migraine  And Toradol as needed            VTE prophylaxis: lovenox       Full Full Full Full Full Full Full Full Full Full Full Full Full

## 2020-02-26 ENCOUNTER — RX ONLY (OUTPATIENT)
Age: 65
Setting detail: RX ONLY
End: 2020-02-26

## 2020-02-26 ENCOUNTER — APPOINTMENT (RX ONLY)
Dept: URBAN - METROPOLITAN AREA CLINIC 20 | Facility: CLINIC | Age: 65
Setting detail: DERMATOLOGY
End: 2020-02-26

## 2020-02-26 DIAGNOSIS — L82.1 OTHER SEBORRHEIC KERATOSIS: ICD-10-CM

## 2020-02-26 DIAGNOSIS — D22 MELANOCYTIC NEVI: ICD-10-CM

## 2020-02-26 DIAGNOSIS — L57.0 ACTINIC KERATOSIS: ICD-10-CM

## 2020-02-26 DIAGNOSIS — L81.4 OTHER MELANIN HYPERPIGMENTATION: ICD-10-CM

## 2020-02-26 DIAGNOSIS — L85.3 XEROSIS CUTIS: ICD-10-CM

## 2020-02-26 DIAGNOSIS — D18.0 HEMANGIOMA: ICD-10-CM

## 2020-02-26 DIAGNOSIS — Z71.89 OTHER SPECIFIED COUNSELING: ICD-10-CM

## 2020-02-26 DIAGNOSIS — Z85.828 PERSONAL HISTORY OF OTHER MALIGNANT NEOPLASM OF SKIN: ICD-10-CM

## 2020-02-26 PROBLEM — D18.01 HEMANGIOMA OF SKIN AND SUBCUTANEOUS TISSUE: Status: ACTIVE | Noted: 2020-02-26

## 2020-02-26 PROBLEM — D22.5 MELANOCYTIC NEVI OF TRUNK: Status: ACTIVE | Noted: 2020-02-26

## 2020-02-26 PROBLEM — D22.62 MELANOCYTIC NEVI OF LEFT UPPER LIMB, INCLUDING SHOULDER: Status: ACTIVE | Noted: 2020-02-26

## 2020-02-26 PROBLEM — D22.71 MELANOCYTIC NEVI OF RIGHT LOWER LIMB, INCLUDING HIP: Status: ACTIVE | Noted: 2020-02-26

## 2020-02-26 PROBLEM — D22.61 MELANOCYTIC NEVI OF RIGHT UPPER LIMB, INCLUDING SHOULDER: Status: ACTIVE | Noted: 2020-02-26

## 2020-02-26 PROBLEM — D22.39 MELANOCYTIC NEVI OF OTHER PARTS OF FACE: Status: ACTIVE | Noted: 2020-02-26

## 2020-02-26 PROBLEM — D22.72 MELANOCYTIC NEVI OF LEFT LOWER LIMB, INCLUDING HIP: Status: ACTIVE | Noted: 2020-02-26

## 2020-02-26 PROCEDURE — 99214 OFFICE O/P EST MOD 30 MIN: CPT | Mod: 25

## 2020-02-26 PROCEDURE — 17003 DESTRUCT PREMALG LES 2-14: CPT

## 2020-02-26 PROCEDURE — 17000 DESTRUCT PREMALG LESION: CPT

## 2020-02-26 PROCEDURE — ? SUNSCREEN RECOMMENDATIONS

## 2020-02-26 PROCEDURE — ? COUNSELING

## 2020-02-26 PROCEDURE — ? ADDITIONAL NOTES

## 2020-02-26 PROCEDURE — ? LIQUID NITROGEN

## 2020-02-26 RX ORDER — AMMONIUM LACTATE 12 %
CREAM (GRAM) TOPICAL
Qty: 1 | Refills: 3 | Status: ERX

## 2020-02-26 ASSESSMENT — LOCATION DETAILED DESCRIPTION DERM
LOCATION DETAILED: RIGHT SUPERIOR UPPER BACK
LOCATION DETAILED: LEFT SUPERIOR MEDIAL MALAR CHEEK
LOCATION DETAILED: LEFT VENTRAL PROXIMAL FOREARM
LOCATION DETAILED: RIGHT DISTAL POSTERIOR UPPER ARM
LOCATION DETAILED: LEFT PLANTAR FOREFOOT OVERLYING 3RD METATARSAL
LOCATION DETAILED: RIGHT MEDIAL PLANTAR MIDFOOT
LOCATION DETAILED: RIGHT INFERIOR MEDIAL MIDBACK
LOCATION DETAILED: INFERIOR THORACIC SPINE
LOCATION DETAILED: RIGHT INFERIOR CENTRAL MALAR CHEEK
LOCATION DETAILED: RIGHT DISTAL POSTERIOR THIGH
LOCATION DETAILED: LEFT SUPERIOR LATERAL MIDBACK
LOCATION DETAILED: RIGHT PROXIMAL PRETIBIAL REGION
LOCATION DETAILED: RIGHT VENTRAL PROXIMAL FOREARM
LOCATION DETAILED: LEFT INFERIOR MEDIAL UPPER BACK
LOCATION DETAILED: LEFT DISTAL POSTERIOR THIGH
LOCATION DETAILED: LEFT PROXIMAL POSTERIOR UPPER ARM
LOCATION DETAILED: LEFT LATERAL PROXIMAL PRETIBIAL REGION
LOCATION DETAILED: LEFT SUPERIOR UPPER BACK
LOCATION DETAILED: RIGHT INFERIOR MEDIAL UPPER BACK
LOCATION DETAILED: RIGHT INFERIOR FOREHEAD

## 2020-02-26 ASSESSMENT — LOCATION SIMPLE DESCRIPTION DERM
LOCATION SIMPLE: LEFT LOWER BACK
LOCATION SIMPLE: LEFT CHEEK
LOCATION SIMPLE: RIGHT PRETIBIAL REGION
LOCATION SIMPLE: LEFT FOREARM
LOCATION SIMPLE: LEFT PLANTAR SURFACE
LOCATION SIMPLE: LEFT PRETIBIAL REGION
LOCATION SIMPLE: LEFT UPPER BACK
LOCATION SIMPLE: RIGHT FOREARM
LOCATION SIMPLE: RIGHT CHEEK
LOCATION SIMPLE: RIGHT POSTERIOR UPPER ARM
LOCATION SIMPLE: RIGHT UPPER BACK
LOCATION SIMPLE: RIGHT LOWER BACK
LOCATION SIMPLE: UPPER BACK
LOCATION SIMPLE: RIGHT FOREHEAD
LOCATION SIMPLE: RIGHT POSTERIOR THIGH
LOCATION SIMPLE: LEFT POSTERIOR UPPER ARM
LOCATION SIMPLE: LEFT POSTERIOR THIGH
LOCATION SIMPLE: RIGHT PLANTAR SURFACE

## 2020-02-26 ASSESSMENT — LOCATION ZONE DERM
LOCATION ZONE: FEET
LOCATION ZONE: ARM
LOCATION ZONE: LEG
LOCATION ZONE: FACE
LOCATION ZONE: TRUNK

## 2020-02-26 NOTE — PROCEDURE: LIQUID NITROGEN
Consent: The patient's consent was obtained including but not limited to risks of crusting, scabbing, blistering, scarring, darker or lighter pigmentary change, recurrence, incomplete removal and infection. RTC in 2 months if lesion(s) persistent.
Render Post-Care Instructions In Note?: yes
Number Of Freeze-Thaw Cycles: 2 freeze-thaw cycles
Detail Level: Detailed
Post-Care Instructions: I reviewed with the patient in detail post-care instructions. Patient is to wear sunprotection, and avoid picking at any of the treated lesions. Pt may apply Vaseline to crusted or scabbing areas.
Duration Of Freeze Thaw-Cycle (Seconds): 10
Render Note In Bullet Format When Appropriate: No
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
## 2020-03-02 ENCOUNTER — RX ONLY (OUTPATIENT)
Age: 65
Setting detail: RX ONLY
End: 2020-03-02

## 2020-03-02 ENCOUNTER — APPOINTMENT (RX ONLY)
Dept: URBAN - METROPOLITAN AREA CLINIC 36 | Facility: CLINIC | Age: 65
Setting detail: DERMATOLOGY
End: 2020-03-02

## 2020-03-02 DIAGNOSIS — L57.0 ACTINIC KERATOSIS: ICD-10-CM

## 2020-03-02 PROCEDURE — 96567 PDT DSTR PRMLG LES SKN: CPT

## 2020-03-02 PROCEDURE — ? PHOTODYNAMIC THERAPY COUNSELING

## 2020-03-02 PROCEDURE — ? PDT: RED

## 2020-03-02 ASSESSMENT — LOCATION ZONE DERM: LOCATION ZONE: FACE

## 2020-03-02 ASSESSMENT — LOCATION DETAILED DESCRIPTION DERM: LOCATION DETAILED: SUPERIOR MID FOREHEAD

## 2020-03-02 ASSESSMENT — LOCATION SIMPLE DESCRIPTION DERM: LOCATION SIMPLE: SUPERIOR FOREHEAD

## 2020-03-02 NOTE — HPI: PHOTODYNAMIC THERAPY (PDT)
Have You Had Previous Treatments With Pdt Before?: has had previous treatments
When Outside In The Sun, Do You...: always burns, never tans
Additional History: Pt was given one tablet of Acyclovir 400mg prior to treatment due to a history of cold sores.

## 2020-03-02 NOTE — PROCEDURE: PDT: RED
Incubation Time (Set To 00:00:00 If Not Wanted): 01:30:00
Consent: Written consent obtained.  The risks were reviewed with the patient including but not limited to: pigmentary changes, pain, blistering, scabbing, redness, and the remote possibility of scarring.
Ndc# (Optional): [id number]
Who Performed The Pdt?: Performed by Nurse, MA or Aesthetician ([id number])
Anesthesia Type: 1% lidocaine with epinephrine
Debridement Text (Will Only Render In Visit Note If You Select Debridement Option Under Who Performed The Pdt Field): Prior to application of the photodynamic medication the hyperkeratotic lesions were curetted to make them more amenable to therapy.
Expiration Date (Optional): 04.2021
Detail Level: Zone
Post-Care Instructions: I reviewed with the patient in detail post-care instructions. Patient is to avoid sunlight for the next 2 days, and wear sun protection. Patients may expect sunburn like redness, discomfort and scabbing.
Comments: Prescription for Acyclovir 400mg suspension TID for 5 days was called into the patient’s pharmacy.
Photosensitizer: Ameluz
Illumination Time: 7 min 07 sec
Application Method: without occlusion
Number Of Kerasticks/Tubes Of Metvixia/Tubes Of Ameluz Used: 1
Lot # (Optional): [id number]
Total Number Of Aks Treated (Optional To Report): 0

## 2020-03-09 ENCOUNTER — APPOINTMENT (RX ONLY)
Dept: URBAN - METROPOLITAN AREA CLINIC 36 | Facility: CLINIC | Age: 65
Setting detail: DERMATOLOGY
End: 2020-03-09

## 2020-03-09 DIAGNOSIS — L57.0 ACTINIC KERATOSIS: ICD-10-CM

## 2020-03-09 PROCEDURE — 96567 PDT DSTR PRMLG LES SKN: CPT

## 2020-03-09 PROCEDURE — ? PHOTODYNAMIC THERAPY COUNSELING

## 2020-03-09 PROCEDURE — ? PDT: RED

## 2020-03-09 ASSESSMENT — LOCATION ZONE DERM: LOCATION ZONE: ARM

## 2020-03-09 ASSESSMENT — LOCATION SIMPLE DESCRIPTION DERM: LOCATION SIMPLE: LEFT FOREARM

## 2020-03-09 ASSESSMENT — LOCATION DETAILED DESCRIPTION DERM: LOCATION DETAILED: LEFT PROXIMAL DORSAL FOREARM

## 2020-03-09 NOTE — PROCEDURE: PDT: RED
Illumination Time: 7 min 7 sec
Anesthesia Type: 0.5% lidocaine with 1:200,000 epinephrine
Expiration Date (Optional): 04.2021
Number Of Kerasticks/Tubes Of Metvixia/Tubes Of Ameluz Used: 1
Consent: Written consent obtained.  The risks were reviewed with the patient including but not limited to: pigmentary changes, pain, blistering, scabbing, redness, and the remote possibility of scarring.
Total Number Of Aks Treated (Optional To Report): 0
Debridement Text (Will Only Render In Visit Note If You Select Debridement Option Under Who Performed The Pdt Field): Prior to application of the photodynamic medication the hyperkeratotic lesions were curetted to make them more amenable to therapy.
Application Method: under occlusion
Ndc# (Optional): [id number]
Lot # (Optional): [id number]
Incubation Time (Set To 00:00:00 If Not Wanted): 03:00
Post-Care Instructions: I reviewed with the patient in detail post-care instructions. Patient is to avoid sunlight for the next 2 days, and wear sun protection. Patients may expect sunburn like redness, discomfort and scabbing.
Detail Level: Zone
Who Performed The Pdt?: Performed by Nurse, MA or Aesthetician ([id number])
Photosensitizer: Ameluz

## 2020-03-27 ENCOUNTER — TELEPHONE (OUTPATIENT)
Dept: CARDIOLOGY | Facility: MEDICAL CENTER | Age: 65
End: 2020-03-27

## 2020-03-27 NOTE — TELEPHONE ENCOUNTER
FELICITA    Patient calling with questions regarding her medications.   [phone]    Thank you

## 2020-03-27 NOTE — TELEPHONE ENCOUNTER
"Had long conversation with pt. She has been suffering from a Crohn's disease flareup and called EMS last night as she has been unable to keep anything down for 3 days and was getting very dehydrated. ZOË wanted to give her Zofran and checked an EKG but she reports that they noted that her \"QTc interval was high at 446\" so couldn't give her zofran. However, she reports that they did another EKG sometime later and QTc was lower so was able to give her zofran in the end.  Pt states that they recommended ER but she declined. She told ZOË she would call her cardiologist. She does not have copies of these EKGs. She has been able to keep down some chicken broth today and feeling better. She says she usually has flare-ups in the Spring.  Reports more a-fib episodes since December, and notes this could be related to the crohn's exacerbation. Discussed her meds, she is crushing them d/t swallowing problems. Discussed PRN hydralazine, she has not been using.     She is overdue for an office visit (was supposed to follow up 11/2019). She was unaware of this. She would like to see Dr. Monahan but no opening in the near future. She wants to know what to do about the a-fib and EKG findings.   "

## 2020-03-30 ENCOUNTER — TELEPHONE (OUTPATIENT)
Dept: ADMINISTRATIVE | Facility: MEDICAL CENTER | Age: 65
End: 2020-03-30

## 2020-03-30 NOTE — TELEPHONE ENCOUNTER
1. Caller Name: self                     Call Back Number:  Cell or home   [facility] PCP or Specialty Provider: Yes Vaishali Monahan cardiologist        2.  Does patient have any active symptoms of respiratory illness (fever OR cough OR shortness of breath OR sore throat)?   denies all resp s/s , no fever..  Paramedics called as weak, HR racing, severe nausea; was tx'd on site with EKG and zofran which helped did not transport.  Today wants to follow up that cardiologist sees EKG      3.  Does patient have any comoribidities?  Multiple    4.  Has the patient traveled in the last 14 days OR had any known contact with someone who is suspected or confirmed to have COVID-19?  No. Did have paramedics on scene last week.     5. Disposition: Cleared by RN Triage; OK to keep/schedule appointment    Note routed to [facility] Provider: Provider action needed:  Wants to ensure cardiologist, Dr Monahan,  reviews recent EKG done by paramedics . Also to call pt with advice.

## 2020-03-31 NOTE — TELEPHONE ENCOUNTER
"Spoke with patient to re-assess symptoms and advise that Dr. Monahan is not in the office.    Had lengthy conversation with patient that required several instances of redirection.  Patient stated that [facility] medics were \"adamant\" about her following up with Dr. Monahan in regards to her long QTc.    Advised patient that I would speak to Dr. Monahan's care team to see what the best course of action is at this time.  Patient verbalized understanding and requested a return phone call at [phone].  Patient stated that she may be in the shower but to leave a message if she is unavailable.    Consulted with Amanda BARNES who stated that she will follow-up with the patient.  "

## 2020-03-31 NOTE — TELEPHONE ENCOUNTER
"Called pt, discussed previous EKG on file reviewed and it showed the same QTc reading ranges that she reported with Marco BARNES. Pt is relieved and delighted with this news, she is concern that her PCP and her GI doctor is not giving her meds for Nausea due to this prolonged QT reported by ZOË, also she reports that she's having Crohn's exacerbation and she thinks that this could make her heart condition exacerbate too, she currently denies any cardiac symptoms, advice pt to discussed concerns with GI doctor or PCP as the QTc she's reporting is actually the same ranges as her previous EKG and if she is not having any cardiac symptoms and it is important for her Chron's exac to be treated. Pt verbalizes understanding but is in a hurry to get off the phone as \"[facility] is at their house setting up some device\".     Called [facility] and requested for copy of EKG. EKG copy from [facility] received, reviewed by Dr Grissom, he agreed with above recommendations, no further recommendations at this time.    Called GI consultants, s/w Gigi BARNES, discussed pt's concerns regarding Chron's exacerbation, they will call and follow up with pt.     [facility] EKG scanned under media.   "

## 2020-04-20 ENCOUNTER — TELEMEDICINE (OUTPATIENT)
Dept: CARDIOLOGY | Facility: MEDICAL CENTER | Age: 65
End: 2020-04-20
Payer: MEDICARE

## 2020-04-20 VITALS
DIASTOLIC BLOOD PRESSURE: 79 MMHG | WEIGHT: 148 LBS | HEART RATE: 105 BPM | HEIGHT: 70 IN | SYSTOLIC BLOOD PRESSURE: 120 MMHG | BODY MASS INDEX: 21.19 KG/M2

## 2020-04-20 DIAGNOSIS — I10 ESSENTIAL HYPERTENSION: ICD-10-CM

## 2020-04-20 DIAGNOSIS — Z93.2 ILEOSTOMY IN PLACE (HCC): ICD-10-CM

## 2020-04-20 DIAGNOSIS — G47.30 SLEEP APNEA, UNSPECIFIED TYPE: ICD-10-CM

## 2020-04-20 DIAGNOSIS — K50.019 CROHN'S DISEASE OF SMALL INTESTINE WITH COMPLICATION (HCC): ICD-10-CM

## 2020-04-20 DIAGNOSIS — I48.0 PAROXYSMAL ATRIAL FIBRILLATION (HCC): ICD-10-CM

## 2020-04-20 PROCEDURE — 99443 PR PHYSICIAN TELEPHONE EVALUATION 21-30 MIN: CPT | Mod: CR | Performed by: PHYSICIAN ASSISTANT

## 2020-04-20 RX ORDER — TORSEMIDE 10 MG/1
10 TABLET ORAL DAILY
COMMUNITY
End: 2020-08-25

## 2020-04-20 RX ORDER — SPIRONOLACTONE 25 MG/1
50 TABLET ORAL 2 TIMES DAILY
Qty: 60 TAB | Refills: 11
Start: 2020-04-20 | End: 2020-08-25

## 2020-04-20 ASSESSMENT — FIBROSIS 4 INDEX: FIB4 SCORE: 1.73

## 2020-04-20 NOTE — PROGRESS NOTES
"  Telephone Appointment Visit   As a means of avoiding spread of COVID-19, this visit is being conducted by telephone. This telephone visit was initiated by the patient and they verbally consented.    Time at start of call: 4:12pm    Reason for Call:  Symptom Follow-up    Patient Comments / History:   Patient of Dr. Monahan.  Current medical problems include hypertension,  DVT, parosxysmal atrial fibrillation not on chronic anticoagulation, TRUDY, COPD, and crohn's disease s/p ileostomy who is here for follow-up.    She is in another Crohn's flare. She thinks she has been going in and out of atrial fibrillation when she has frequent vomiting. Describes the episodes of a tightness in her chest, lasting minutes, resolves on its own. She also was diagnosed with an esophageal disorder so she is not sure if that is causing the symptoms.     Has been going to [facility] for rehydration, last visit 2 weeks ago.     In general her blood pressure is generally in the low 100s, to 120s, (outside of vomiting episodes). Only used hydralazine once or twice.     Her leg swelling is worse with ambulation. Works with PT for lymphedema. Uses Torsemide about twice a month.    Only using aspirin 81mg \"as needed\" when she feels like she is in atrial fibrillation.       Labs / Images Reviewed   ECG 5/22/19: Sinus tachycardia with RSR' in V1, QTc 440ms    TTE (12/27/2017):  CONCLUSIONS  1. Normal right and left ventricular size and function. Normal regional wall motion  2. Abnormal septal motion consistent with RV volume overload, however estimated right atrial pressure by IVC assessment is normal  3. No significant valve disease or flow abnormalities.   4. Unable to estimate pulmonary artery pressure due to an inadequate tricuspid regurgitant jet.    Compared to the images of the study done 4/3/13 - there has been no significant change.     Zio patch 1/3/18  - 19sec of possible atrial fibrillation  - symptomatic SVT     Cardiac PET " 2/5/2018  ELECTROCARDIOGRAPHIC FINDINGS:  EKG review shows a normal sinus rhythm with no   ischemic ST segment changes at rest or stress.     SCINTOGRAPHIC FINDINGS:  There is normal homogenous tracer uptake at rest and   stress.     GATED WALL MOTION FINDINGS:  Show an ejection fraction of 72% at rest, which increases to 78% at peak stress.     CONCLUSIONS AND IMPRESSION:  Normal cardiac stress test.    Assessment and Plan:    Atrial fibrillation, paroxysmal  - [id number] 3 (age, gender, HTN since she is in kiley 50mg).   -  We again discussed risk/benefit of anticoagulation given her history of crohns, and she has opted for only anticoagulation with aspirin at this time. She instructed her to take the DAILY, not PRN. There has been weak documentation of afib (possible run seen on zio patch, none on ECGs going back to 2011).  -continue metoprolol to 50mg PO bid for rate control.    HTN, controlled  -cont spironolactone 50mg  -hydralazine 10mg PO prn SBP >150  -continue metoprolol.       Leg swelling  - 2/2 lymphedema vs. Venous insufficiency   -continue spironolactone 50mg daily.   - using Torsemide/potassium PRN      TRUDY  -continue CPAP, many of symptoms much improved.     ? Prolonged QTc  - apparently QTc was 446ms by [facility] ECG, I cannot accurately measure QT of the copied strips. Request recent ECG done at [facility]     Request [facility] labs and ECG to ensure electrolytes WNL given her frequently vomiting/ileostomy complications    Follow-up: as scheduled with Dr. Monahan in Aug    Time at end of call: 4:42  Total Time Spent: 30 minutes    Latoya Oh P.A.-C.

## 2020-04-27 ENCOUNTER — TELEPHONE (OUTPATIENT)
Dept: CARDIOLOGY | Facility: MEDICAL CENTER | Age: 65
End: 2020-04-27

## 2020-04-27 DIAGNOSIS — K50.019 CROHN'S DISEASE OF SMALL INTESTINE WITH COMPLICATION (HCC): ICD-10-CM

## 2020-04-27 DIAGNOSIS — E87.6 HYPOKALEMIA: ICD-10-CM

## 2020-04-27 DIAGNOSIS — I10 ESSENTIAL HYPERTENSION: ICD-10-CM

## 2020-04-29 NOTE — TELEPHONE ENCOUNTER
Message   Received: Yesterday   Message Contents   Latoya Oh P.A.-C.  Grisel Em R.N.   Caller: Unspecified (2 days ago,  2:40 PM)             Please let patient know I reviewed her labs from [place] on 4/9 and her potassium was low which is common with frequent GI losses.     It does not look like she is on a potassium supplement but she is taking spironolactone which helps with potassium. If she hasn't had labs checked since 4/9 then we should repeat a BMP and Magnesium level (dx: hypokalemia) and I'll prescribe her a potassium supplement based on the updated labs.     Thanks,   JA      Attempted to reach patient.  Left message stating that we want to discuss labs and requested a return phone call.

## 2020-04-30 NOTE — TELEPHONE ENCOUNTER
" Spoke with patient who states she has not had repeat labs done since 4/9.  Reports that she takes a potassium supplement but was unable to provide dose.    Advised patient of recommendations, and she is agreeable with the plan.  Order was entered into the chart.    Patient verbalized her appreciation for the phone call and also thanked us for \"doing our jobs during these unsafe times\".  "

## 2020-05-07 ENCOUNTER — HOSPITAL ENCOUNTER (OUTPATIENT)
Dept: LAB | Facility: MEDICAL CENTER | Age: 65
End: 2020-05-07
Attending: FAMILY MEDICINE
Payer: MEDICARE

## 2020-05-07 ENCOUNTER — HOSPITAL ENCOUNTER (OUTPATIENT)
Dept: LAB | Facility: MEDICAL CENTER | Age: 65
End: 2020-05-07
Attending: PHYSICIAN ASSISTANT
Payer: MEDICARE

## 2020-05-07 DIAGNOSIS — E87.6 HYPOKALEMIA: ICD-10-CM

## 2020-05-07 DIAGNOSIS — I10 ESSENTIAL HYPERTENSION: ICD-10-CM

## 2020-05-07 LAB
25(OH)D3 SERPL-MCNC: 22 NG/ML (ref 30–100)
ALBUMIN SERPL BCP-MCNC: 4 G/DL (ref 3.2–4.9)
ALBUMIN/GLOB SERPL: 1.3 G/DL
ALP SERPL-CCNC: 105 U/L (ref 30–99)
ALT SERPL-CCNC: 22 U/L (ref 2–50)
ANION GAP SERPL CALC-SCNC: 14 MMOL/L (ref 7–16)
ANION GAP SERPL CALC-SCNC: 14 MMOL/L (ref 7–16)
AST SERPL-CCNC: 33 U/L (ref 12–45)
BASOPHILS # BLD AUTO: 1 % (ref 0–1.8)
BASOPHILS # BLD: 0.07 K/UL (ref 0–0.12)
BILIRUB SERPL-MCNC: 0.5 MG/DL (ref 0.1–1.5)
BUN SERPL-MCNC: 16 MG/DL (ref 8–22)
BUN SERPL-MCNC: 16 MG/DL (ref 8–22)
CALCIUM SERPL-MCNC: 9.2 MG/DL (ref 8.4–10.2)
CALCIUM SERPL-MCNC: 9.2 MG/DL (ref 8.4–10.2)
CHLORIDE SERPL-SCNC: 106 MMOL/L (ref 96–112)
CHLORIDE SERPL-SCNC: 107 MMOL/L (ref 96–112)
CHOLEST SERPL-MCNC: 198 MG/DL (ref 100–199)
CO2 SERPL-SCNC: 19 MMOL/L (ref 20–33)
CO2 SERPL-SCNC: 19 MMOL/L (ref 20–33)
COMMENT 1642: NORMAL
CREAT SERPL-MCNC: 0.86 MG/DL (ref 0.5–1.4)
CREAT SERPL-MCNC: 0.92 MG/DL (ref 0.5–1.4)
EOSINOPHIL # BLD AUTO: 0.29 K/UL (ref 0–0.51)
EOSINOPHIL NFR BLD: 4.2 % (ref 0–6.9)
ERYTHROCYTE [DISTWIDTH] IN BLOOD BY AUTOMATED COUNT: 46.3 FL (ref 35.9–50)
FASTING STATUS PATIENT QL REPORTED: NORMAL
FERRITIN SERPL-MCNC: 144 NG/ML (ref 10–291)
FOLATE SERPL-MCNC: 10.9 NG/ML
GLOBULIN SER CALC-MCNC: 3.1 G/DL (ref 1.9–3.5)
GLUCOSE SERPL-MCNC: 87 MG/DL (ref 65–99)
GLUCOSE SERPL-MCNC: 88 MG/DL (ref 65–99)
HCT VFR BLD AUTO: 40.2 % (ref 37–47)
HDLC SERPL-MCNC: 80 MG/DL
HGB BLD-MCNC: 13 G/DL (ref 12–16)
IMM GRANULOCYTES # BLD AUTO: 0.04 K/UL (ref 0–0.11)
IMM GRANULOCYTES NFR BLD AUTO: 0.6 % (ref 0–0.9)
IRON SATN MFR SERPL: 40 % (ref 15–55)
IRON SERPL-MCNC: 144 UG/DL (ref 40–170)
LDLC SERPL CALC-MCNC: 92 MG/DL
LYMPHOCYTES # BLD AUTO: 1.46 K/UL (ref 1–4.8)
LYMPHOCYTES NFR BLD: 21.1 % (ref 22–41)
MAGNESIUM SERPL-MCNC: 1.7 MG/DL (ref 1.5–2.5)
MAGNESIUM SERPL-MCNC: 1.7 MG/DL (ref 1.5–2.5)
MCH RBC QN AUTO: 31 PG (ref 27–33)
MCHC RBC AUTO-ENTMCNC: 32.3 G/DL (ref 33.6–35)
MCV RBC AUTO: 95.7 FL (ref 81.4–97.8)
MONOCYTES # BLD AUTO: 0.61 K/UL (ref 0–0.85)
MONOCYTES NFR BLD AUTO: 8.8 % (ref 0–13.4)
NEUTROPHILS # BLD AUTO: 4.46 K/UL (ref 2–7.15)
NEUTROPHILS NFR BLD: 64.3 % (ref 44–72)
NRBC # BLD AUTO: 0 K/UL
NRBC BLD-RTO: 0 /100 WBC
PLATELET # BLD AUTO: 182 K/UL (ref 164–446)
PMV BLD AUTO: 10.8 FL (ref 9–12.9)
POTASSIUM SERPL-SCNC: 3.8 MMOL/L (ref 3.6–5.5)
POTASSIUM SERPL-SCNC: 3.8 MMOL/L (ref 3.6–5.5)
PROT SERPL-MCNC: 7.1 G/DL (ref 6–8.2)
RBC # BLD AUTO: 4.2 M/UL (ref 4.2–5.4)
SODIUM SERPL-SCNC: 139 MMOL/L (ref 135–145)
SODIUM SERPL-SCNC: 140 MMOL/L (ref 135–145)
T3 SERPL-MCNC: 91.8 NG/DL (ref 60–181)
T4 FREE SERPL-MCNC: 0.96 NG/DL (ref 0.93–1.7)
TIBC SERPL-MCNC: 360 UG/DL (ref 250–450)
TRIGL SERPL-MCNC: 131 MG/DL (ref 0–149)
TSH SERPL DL<=0.005 MIU/L-ACNC: 0.74 UIU/ML (ref 0.38–5.33)
UIBC SERPL-MCNC: 216 UG/DL (ref 110–370)
VIT B12 SERPL-MCNC: 1426 PG/ML (ref 211–911)
WBC # BLD AUTO: 6.9 K/UL (ref 4.8–10.8)

## 2020-05-07 PROCEDURE — 82728 ASSAY OF FERRITIN: CPT

## 2020-05-07 PROCEDURE — 83735 ASSAY OF MAGNESIUM: CPT | Mod: 91

## 2020-05-07 PROCEDURE — 80061 LIPID PANEL: CPT | Mod: GA

## 2020-05-07 PROCEDURE — 84443 ASSAY THYROID STIM HORMONE: CPT

## 2020-05-07 PROCEDURE — 85025 COMPLETE CBC W/AUTO DIFF WBC: CPT

## 2020-05-07 PROCEDURE — 83540 ASSAY OF IRON: CPT

## 2020-05-07 PROCEDURE — 84480 ASSAY TRIIODOTHYRONINE (T3): CPT

## 2020-05-07 PROCEDURE — 36415 COLL VENOUS BLD VENIPUNCTURE: CPT

## 2020-05-07 PROCEDURE — 83735 ASSAY OF MAGNESIUM: CPT

## 2020-05-07 PROCEDURE — 82746 ASSAY OF FOLIC ACID SERUM: CPT

## 2020-05-07 PROCEDURE — 84439 ASSAY OF FREE THYROXINE: CPT

## 2020-05-07 PROCEDURE — 82306 VITAMIN D 25 HYDROXY: CPT

## 2020-05-07 PROCEDURE — 82607 VITAMIN B-12: CPT

## 2020-05-07 PROCEDURE — 80053 COMPREHEN METABOLIC PANEL: CPT

## 2020-05-07 PROCEDURE — 80048 BASIC METABOLIC PNL TOTAL CA: CPT

## 2020-05-07 PROCEDURE — 83550 IRON BINDING TEST: CPT

## 2020-05-11 ENCOUNTER — TELEPHONE (OUTPATIENT)
Dept: CARDIOLOGY | Facility: MEDICAL CENTER | Age: 65
End: 2020-05-11
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
## 2020-06-30 ENCOUNTER — APPOINTMENT (OUTPATIENT)
Dept: SPEECH THERAPY | Facility: REHABILITATION | Age: 65
End: 2020-06-30
Attending: FAMILY MEDICINE
Payer: MEDICARE

## 2020-07-02 ENCOUNTER — APPOINTMENT (OUTPATIENT)
Dept: SPEECH THERAPY | Facility: REHABILITATION | Age: 65
End: 2020-07-02
Payer: MEDICARE

## 2020-07-07 ENCOUNTER — APPOINTMENT (OUTPATIENT)
Dept: SPEECH THERAPY | Facility: REHABILITATION | Age: 65
End: 2020-07-07
Payer: MEDICARE

## 2020-07-09 ENCOUNTER — APPOINTMENT (OUTPATIENT)
Dept: SPEECH THERAPY | Facility: REHABILITATION | Age: 65
End: 2020-07-09
Payer: MEDICARE

## 2020-07-14 ENCOUNTER — APPOINTMENT (OUTPATIENT)
Dept: SPEECH THERAPY | Facility: REHABILITATION | Age: 65
End: 2020-07-14
Payer: MEDICARE

## 2020-07-16 ENCOUNTER — APPOINTMENT (OUTPATIENT)
Dept: SPEECH THERAPY | Facility: REHABILITATION | Age: 65
End: 2020-07-16
Payer: MEDICARE

## 2020-08-23 DIAGNOSIS — I10 ESSENTIAL HYPERTENSION: ICD-10-CM

## 2020-08-25 ENCOUNTER — OFFICE VISIT (OUTPATIENT)
Dept: CARDIOLOGY | Facility: MEDICAL CENTER | Age: 65
End: 2020-08-25
Payer: MEDICARE

## 2020-08-25 VITALS
HEIGHT: 72 IN | HEART RATE: 82 BPM | OXYGEN SATURATION: 96 % | BODY MASS INDEX: 20.99 KG/M2 | DIASTOLIC BLOOD PRESSURE: 60 MMHG | WEIGHT: 155 LBS | SYSTOLIC BLOOD PRESSURE: 110 MMHG

## 2020-08-25 DIAGNOSIS — K94.13: Chronic | ICD-10-CM

## 2020-08-25 DIAGNOSIS — K50.019 CROHN'S DISEASE OF SMALL INTESTINE WITH COMPLICATION (HCC): ICD-10-CM

## 2020-08-25 DIAGNOSIS — I82.431 ACUTE DEEP VEIN THROMBOSIS (DVT) OF POPLITEAL VEIN OF RIGHT LOWER EXTREMITY (HCC): ICD-10-CM

## 2020-08-25 DIAGNOSIS — B37.81 THRUSH OF MOUTH AND ESOPHAGUS (HCC): ICD-10-CM

## 2020-08-25 DIAGNOSIS — B37.0 THRUSH OF MOUTH AND ESOPHAGUS (HCC): ICD-10-CM

## 2020-08-25 DIAGNOSIS — J44.9 CHRONIC OBSTRUCTIVE PULMONARY DISEASE, UNSPECIFIED COPD TYPE (HCC): ICD-10-CM

## 2020-08-25 DIAGNOSIS — J96.11 CHRONIC RESPIRATORY FAILURE WITH HYPOXIA (HCC): ICD-10-CM

## 2020-08-25 DIAGNOSIS — Z93.2 ILEOSTOMY IN PLACE (HCC): ICD-10-CM

## 2020-08-25 DIAGNOSIS — I48.0 PAROXYSMAL ATRIAL FIBRILLATION (HCC): ICD-10-CM

## 2020-08-25 DIAGNOSIS — F11.20 OPIOID TYPE DEPENDENCE, CONTINUOUS (HCC): ICD-10-CM

## 2020-08-25 PROCEDURE — 99214 OFFICE O/P EST MOD 30 MIN: CPT | Performed by: INTERNAL MEDICINE

## 2020-08-25 RX ORDER — METOPROLOL TARTRATE 50 MG/1
TABLET, FILM COATED ORAL
Qty: 180 TAB | Refills: 3 | Status: SHIPPED | OUTPATIENT
Start: 2020-08-25 | End: 2021-08-25

## 2020-08-25 RX ORDER — TORSEMIDE 10 MG/1
10-20 TABLET ORAL
Status: ON HOLD | COMMUNITY
Start: 2020-08-25 | End: 2022-05-22

## 2020-08-25 RX ORDER — CETIRIZINE HYDROCHLORIDE 10 MG/1
10 TABLET ORAL DAILY
COMMUNITY
End: 2021-05-20

## 2020-08-25 RX ORDER — SPIRONOLACTONE 25 MG/1
50 TABLET ORAL DAILY
COMMUNITY
Start: 2020-08-25 | End: 2020-12-22 | Stop reason: SDUPTHER

## 2020-08-25 ASSESSMENT — FIBROSIS 4 INDEX: FIB4 SCORE: 2.59

## 2020-08-25 NOTE — PROGRESS NOTES
Cardiology Follow-up Consultation Note    Date of note:    8/25/2020  Primary Care Provider: Amanda Bazzi M.D.    Name:             Jana Overton   YOB: 1955  MRN:               [id number]    CC: palpitations.      Patient ID/HPI:   Jana Overton is a 65 y.o. female whose current medical problems include hypertension,  DVT, parosxysmal atrial fibrillation, TRUDY, COPD, and crohn's disease who is here for follow-up.     Clinic visit: 12/27/2017, at that time:     She also has moderate severity left sided chest tightness which radiates down her left arm. This is not necessarily associated with exertion.  She has been told to go to the emergency room multiple times for evaluation, however she continues to refuse as she's scared of getting inadequate treatment there.     SBP at home occasionally down to 70s/40s, HR up to 160s. + LE edema     She is currently not taking metoprolol or lasix. She does not take aspirin daily as it causes severe burning in her stomach.    Does see a pain specialist Dr. Telles and has been working on down-titrating opiates.     At our clinic visit: 12/29/2017  She still has chest pain, occasionally pressure-like at rest, resolves within minutes, also has sharp chest pain at times, associated with movement.     At our visit, 1/24/2018:  Pulse does go very high with exercise up to 140s with very mild exercise. Ziopatch showed periodic SVT and likely atrial fibrillation.  Still having palpitation daily.  No passing out, no falls.     In terms of TRUDY, she started her CPAP and feels much better in terms of her energy level and concentration.     In terms of her hypertension, 90s-100s/60s. Highest is 140s/90s.     Taking torsemide once a week along with potassium.     Still having moderate severity right sided chest pain associated with palpitations and ear pounding.     At our clinic visit, 5/30/2019:  2 days of drenching sweats, severe fatigue yesterday. Associated  with palpitations.  Up to taking metoprolol 25mg four times a day. Usual sitting pulse is 60, last was in the 100s.     Has also had nausea and anorexia and likely crohn's flare. Followed by Dr. Mahan and Dr. Sepulveda.      BP at home in the 110s-160s.  No syncope.     Currently with minimal cardiac symptoms, continues to have abdominal pain.     Interim History:  Had multiple crohn's flares and heat stroke since our last visit. Currently feeling well.      Just weaned herself off oxycodone.     Hypertension well controlled.     In terms of a fib, since she's feeling well, no a fib recently.     ROS  All other systems reviewed and discussed using a comprehensive questionnaire which has been scanned into [employer] as part of this appointment.          Past Medical History:   Diagnosis Date   • Anesthesia     difficult Iv stick   • Anxiety    • Arthritis     all over   • ASTHMA    • Atrial fib/flut    • Backpain    • Breath shortness    • Bronchitis     5 years   • Chronic pain    • Colostomy in place ([facility]) 10/14/2010   • COPD (chronic obstructive pulmonary disease) ([facility]) 6/24/2014   • COPD (chronic obstructive pulmonary disease) ([facility]) 6/24/2014   • Crohn's disease of colon ([facility])    • Dyspnea    • History of cardiac murmur    • Hypokalemia 6/24/2014   • Indigestion    • Infectious disease     MRSA, VancoRSA   • Multiple falls 6/24/2014   • Narcotic dependence ([facility]) 9/23/2009   • Obstruction    • Other specified disorder of intestines     crohns disease   • Pain 7/11/13    all over   • Pneumonia     child and mid 30's   • Postherpetic neuralgia 6/24/2014   • Rosacea 6/24/2014   • Sleep apnea          Past Surgical History:   Procedure Laterality Date   • GASTROSCOPY N/A 5/22/2019    Procedure: GASTROSCOPY - WITH DILATION;  Surgeon: Dionicio Cardona M.D.;  Location: SURGERY [facility];  Service: Gastroenterology   • GASTROSCOPY  1/6/2019    Procedure: GASTROSCOPY- WITH DILATION;  Surgeon: Daniel Daniels M.D.;   Location: [facility];  Service: Gastroenterology   • ILEOSTOMY  12/29/2018    Procedure: ILEOSTOMY- REVISION;  Surgeon: Kevin Cruz M.D.;  Location: SURGERY [facility];  Service: General   • SIGMOIDOSCOPY FLEX  12/29/2018    Procedure: SIGMOIDOSCOPY FLEX;  Surgeon: Kevin Cruz M.D.;  Location: SURGERY [facility];  Service: General   • EXPLORATORY LAPAROTOMY  12/29/2018    Procedure: EXPLORATORY LAPAROTOMY, lysis of adhesions;  Surgeon: Kevin Cruz M.D.;  Location: SURGERY [facility];  Service: General   • ILEOSTOMY  5/14/2014    Performed by Kevin Cruz M.D. at [facility]   • MAMMOPLASTY REDUCTION  7/17/2013    Performed by Janey Burt M.D. at [facility]   • HARDWARE REMOVAL NEURO  7/16/2012    Performed by KEELEY KIM at [facility]   • GASTROSCOPY  10/4/2011    Performed by TYSON MARTINEZ at SUR[facility]ERY [facility]   • COLONOSCOPY  10/4/2011    Performed by TYSON MARTINEZ at SURGERY SAME DAY [facility]   • DILATION AND CURETTAGE  9/24/2010    Performed by GAURAV ALY at SURGERY SAME DAY [facility]   • ILEOSTOMY  11/11/2009    Performed by KEVIN CRUZ at [facility]   • GASTROSCOPY WITH BIOPSY  11/22/08    Performed by NEGRITA HUYNH at [facility]   • ABDOMINAL EXPLORATION     • CERVICAL DISK AND FUSION ANTERIOR     • COLON RESECTION     • FOOT SURGERY     • HAND SURGERY     • LUMBAR FUSION ANTERIOR     • LUMPECTOMY     • OTHER ABDOMINAL SURGERY      surgery for chrons disease   • PB REDUCTION OF LARGE BREAST           Current Outpatient Medications   Medication Sig Dispense Refill   • metoprolol (LOPRESSOR) 50 MG Tab TAKE 1 TABLET BY MOUTH TWICE DAILY. HOLD IF BLOOD PRESSURE LESS THAN 95/50 180 Tab 3   • cetirizine (ZYRTEC) 10 MG Tab Take 10 mg by mouth every day.     • ONDANSETRON HCL PO Take 2 mg by mouth as needed.     • POTASSIUM PO Take 10 mEq by mouth as needed.     •  Probiotic Product (PROBIOTIC PO) Take  by mouth.     • spironolactone (ALDACTONE) 25 MG Tab Take 2 Tabs by mouth 2 Times a Day. 60 Tab 11   • hydrALAZINE (APRESOLINE) 10 MG Tab Take 10 mg by mouth 3 times a day as needed (High BP). For SBP >155     • prochlorperazine (COMPAZINE) 10 MG Tab Take 10 mg by mouth every 6 hours as needed for Nausea/Vomiting.     • diazePAM (VALIUM) 5 MG Tab Take 5 mg by mouth every 6 hours as needed for Anxiety.     • aspirin (ASA) 81 MG Chew Tab chewable tablet Take 81 mg by mouth every day.     • albuterol (VENTOLIN OR PROVENTIL) 108 (90 BASE) MCG/ACT Aero Soln Inhale 2 Puffs by mouth every 6 hours as needed.     • oxycodone 20 MG/ML CONC Take 50-80 mg by mouth every four hours as needed (Pain). 20mg/ml solution     • torsemide (DEMADEX) 10 MG tablet Take 10 mg by mouth every day.       No current facility-administered medications for this visit.          Allergies   Allergen Reactions   • Azathioprine Sodium Hives   • Carafate [Sucralfate] Rash     Generalizes/Mouth Sores   • Infliximab Hives   • Morphine Swelling   • Roxanol [Morphine Sulfate] Swelling     Throat swells   • Amitriptyline Unspecified     Nightmares     • Demerol Vomiting   • Fentanyl Unspecified     Patches caused skin breakdown, no pain relief   • Lorazepam Unspecified     States give me nightmares.    • Methadone    • Methotrexate Hives and Rash   • Pregabalin Rash     Rash     • Pseudoephedrine Vomiting   • Sulfa Drugs Hives   • Tape Rash     Skin peels off; paper tape   • Lyrica    • Promethazine    • Celestone [Betamethasone Sodium Phosphate] Unspecified     Pt unable to remember   • Sulfasalazine Unspecified     Pt unable to remember   • Tizanidine Hydrochloride Unspecified     Pt unable to remember         Family History   Problem Relation Age of Onset   • Lung Disease Mother         copd   • Diabetes Father    • Heart Disease Father    • Diabetes Sister    • Cancer Maternal Aunt         breast         Social  History     Socioeconomic History   • Marital status:      Spouse name: Not on file   • Number of children: Not on file   • Years of education: Not on file   • Highest education level: Not on file   Occupational History   • Not on file   Social Needs   • Financial resource strain: Not on file   • Food insecurity     Worry: Not on file     Inability: Not on file   • Transportation needs     Medical: Not on file     Non-medical: Not on file   Tobacco Use   • Smoking status: Never Smoker   • Smokeless tobacco: Never Used   • Tobacco comment: second hand smoke   Substance and Sexual Activity   • Alcohol use: Yes   • Drug use: Yes     Comment: medical marijuana   • Sexual activity: Not on file     Comment:    Lifestyle   • Physical activity     Days per week: Not on file     Minutes per session: Not on file   • Stress: Not on file   Relationships   • Social connections     Talks on phone: Not on file     Gets together: Not on file     Attends Muslim service: Not on file     Active member of club or organization: Not on file     Attends meetings of clubs or organizations: Not on file     Relationship status: Not on file   • Intimate partner violence     Fear of current or ex partner: Not on file     Emotionally abused: Not on file     Physically abused: Not on file     Forced sexual activity: Not on file   Other Topics Concern   • Not on file   Social History Narrative   • Not on file         Physical Exam:  Ambulatory Vitals  /60 (BP Location: Left arm, Patient Position: Sitting, BP Cuff Size: Adult)   Pulse 82   Ht 1.829 m (6')   Wt 70.3 kg (155 lb)   SpO2 96%    Oxygen Therapy:  Pulse Oximetry: 96 %  BP Readings from Last 4 Encounters:   08/25/20 110/60   04/20/20 120/79   06/08/19 158/80   05/30/19 138/90       Weight/BMI: Body mass index is 21.02 kg/m².  Wt Readings from Last 4 Encounters:   08/25/20 70.3 kg (155 lb)   04/20/20 67.1 kg (148 lb)   06/08/19 68.5 kg (151 lb 0.2 oz)   05/30/19  68.9 kg (152 lb)       General: NAD  Eyes: nl conjunctiva  ENT: OP covered by mask.   Neck: JVP <8 cm H2O, no carotid bruits  Lungs: normal respiratory effort, bibasilar crackles  Heart: RRR, 1/6 systolic murmur, no rubs or gallops, trace edema bilateral lower extremities. No LV/RV heave on cardiac palpatation. 2+ bilateral radial pulses.   Abdomen: soft, mildly ttp, just had ostomy adjusted. non distended, no masses, normal bowel sounds.  No HSM.  Extremities/MSK: no clubbing, no cyanosis  Neurological: No focal sensory deficits  Psychiatric: Appropriate affect, A/O x 3  Skin: Warm extremities    Exam repeated in full and unchanged except for as noted above.        Lab Data Review:  Lab Results   Component Value Date/Time    CHOLSTRLTOT 198 05/07/2020 01:34 PM    LDL 92 05/07/2020 01:34 PM    HDL 80 05/07/2020 01:34 PM    TRIGLYCERIDE 131 05/07/2020 01:34 PM       Lab Results   Component Value Date/Time    SODIUM 139 05/07/2020 01:37 PM    POTASSIUM 3.8 05/07/2020 01:37 PM    CHLORIDE 106 05/07/2020 01:37 PM    CO2 19 (L) 05/07/2020 01:37 PM    GLUCOSE 87 05/07/2020 01:37 PM    BUN 16 05/07/2020 01:37 PM    CREATININE 0.86 05/07/2020 01:37 PM    CREATININE 0.89 02/10/2010 04:04 PM    BUNCREATRAT 17 02/10/2010 04:04 PM    GLOMRATE >59 02/10/2010 04:04 PM     Lab Results   Component Value Date/Time    ALKPHOSPHAT 105 (H) 05/07/2020 01:34 PM    ASTSGOT 33 05/07/2020 01:34 PM    ALTSGPT 22 05/07/2020 01:34 PM    TBILIRUBIN 0.5 05/07/2020 01:34 PM      Lab Results   Component Value Date/Time    WBC 6.9 05/07/2020 01:34 PM    WBC 7.9 02/10/2010 04:04 PM     Lab Results   Component Value Date/Time    HBA1C 5.3 08/27/2015 01:50 PM     No components found for: TROP          Cardiac Imaging and Procedures Review:    EKG dated 12/26/2017 : My personal interpretation is NSR, LAE, RsR' in V1/V2     Echo dated 2013:   FINDINGS  Left Ventricle  Normal left ventricular size, thickness, systolic function, and   diastolic function.  Left ventricular ejection fraction is 55% to 60%.    Right Ventricle  The right ventricle was normal in size and function.      Right Atrium  The right atrium is normal in size.      Left Atrium  The left atrium is normal in size.      Mitral Valve  The mitral valve is structurally and functionally normal.    Aortic Valve  The aortic valve is structurally and functionally normal.    Tricuspid Valve  The tricuspid valve is structurally and functionally normal. Right   ventricular systolic pressure is estimated to be 14 mmHg.    Pulmonic Valve  The pulmonic valve is structurally and functionally normal.    Pericardium  Normal pericardium without effusion.      Aorta  The aortic root is normal.      CONCLUSIONS  Normal echocardiogram.         TTE (12/27/2017):  CONCLUSIONS  1. Normal right and left ventricular size and function. Normal regional wall motion  2. Abnormal septal motion consistent with RV volume overload, however estimated right atrial pressure by IVC assessment is normal  3. No significant valve disease or flow abnormalities.   4. Unable to estimate pulmonary artery pressure due to an inadequate tricuspid regurgitant jet.    Compared to the images of the study done 4/3/13 - there has been no significant change.      Nuclear Perfusion Imaging (2013):   RESULTS:  1.  There were no new ECG changes and no arrhythmia.    2.  Heart rate did increase to 92 and the blood pressure negrito somewhat to   162/77 after an initial drop to 117 systolic but then returned to baseline.  3.  The raw data demonstrated no unexpected extracardiac isotope uptake,   fairly dense collection of isotope in the right mid to lower abdomen.  4.  Rest-stress image comparison reveals no perfusion abnormalities in any of   the multiple SPECT image panels.  5.  The gated wall motion study demonstrated low cardiac volumes with very   small end systolic volume.  The global contractility was normal, EF 74%.    Regional contractility also  normal.     SUMMARY:  Lexiscan stress myocardial perfusion stress imaging with technetium   99m tetrofosmin demonstratin.  No infarct or ischemia.  2.  Normal global and hyperdynamic regional function, EF 74%.  3.  No ECG changes or arrhythmia.  4.  No prior for comparison.              Radiology test Review:  CXR:   The mediastinal and cardiac silhouette is unremarkable.    The pulmonary vascularity is within normal limits.    The lung parenchyma demonstrates no airspace process. Calcified granuloma in the right lateral mid hemithorax is unchanged..    There is no significant pleural effusion.    There is no visible pneumothorax.    There are no acute bony abnormalities.   Impression        1.  No evidence of acute cardiopulmonary process.         PET 2018:  ELECTROCARDIOGRAPHIC FINDINGS:  EKG review shows a normal sinus rhythm with no   ischemic ST segment changes at rest or stress.     SCINTOGRAPHIC FINDINGS:  There is normal homogenous tracer uptake at rest and   stress.     GATED WALL MOTION FINDINGS:  Show an ejection fraction of 72% at rest, which   increases to 78% at peak stress.     CONCLUSIONS AND IMPRESSION:  Normal cardiac stress test.        Medical Decision Makin. Atrial fibrillation, unspecified type (CMS-HCC)  Paroxysmal, asymptomatic.  Also has occasional short runs of symptomatic SVT.  given normotension at home, her [id number] score of 2.  We previously have discussed at length risk/benefit of anticoagulation given her history of crohns, and she has opted for only anticoagulation with aspirin at this time.  -continue aspirin for AC for now. She tolerates only 81mg PO daily due to stomach discomfort.    -continue metoprolol to 50mg PO bid for rate control (she takes this 25mg qid at times which is fine).    2. Other chest pain  Previous negative stress test, no chest pain symptoms recently.   -sucralfate for GI ppx while on aspirin.   -ED precautions discussed.   -cont  metoprolol     3. Essential (primary) hypertension   Likely white coat hypertension given very low readings at home that she has confirmed with her PCP's blood pressure cuff. Her Bps have recently been labile possibly due to dehydration and variable bowel habits  -cont spironolactone, currently at 50mg QAM.   -hydralazine 10mg PO prn SBP >150  -continue metoprolol.      4. Leg swelling  -continue spironolactone daily. Stopped lasix/potassium prn as she has had some dehydration episodes. Leg swelling much improved today.     5. TRUDY  -continue CPAP, many of symptoms much improved.     Return in about 1 year (around 8/25/2021).      Harvey Monahan MD  [facility]  [facility], [address]  [address] [place] [id number]  Phone: [phone]  Fax: [phone]

## 2020-09-02 ENCOUNTER — APPOINTMENT (RX ONLY)
Dept: URBAN - METROPOLITAN AREA CLINIC 20 | Facility: CLINIC | Age: 65
Setting detail: DERMATOLOGY
End: 2020-09-02

## 2020-09-02 DIAGNOSIS — L57.0 ACTINIC KERATOSIS: ICD-10-CM

## 2020-09-02 DIAGNOSIS — Z85.828 PERSONAL HISTORY OF OTHER MALIGNANT NEOPLASM OF SKIN: ICD-10-CM

## 2020-09-02 DIAGNOSIS — Z09 ENCOUNTER FOR FOLLOW-UP EXAMINATION AFTER COMPLETED TREATMENT FOR CONDITIONS OTHER THAN MALIGNANT NEOPLASM: ICD-10-CM | Status: IMPROVED

## 2020-09-02 DIAGNOSIS — D18.0 HEMANGIOMA: ICD-10-CM

## 2020-09-02 DIAGNOSIS — D22 MELANOCYTIC NEVI: ICD-10-CM

## 2020-09-02 DIAGNOSIS — Z71.89 OTHER SPECIFIED COUNSELING: ICD-10-CM

## 2020-09-02 DIAGNOSIS — L82.1 OTHER SEBORRHEIC KERATOSIS: ICD-10-CM

## 2020-09-02 DIAGNOSIS — L71.8 OTHER ROSACEA: ICD-10-CM

## 2020-09-02 DIAGNOSIS — L81.4 OTHER MELANIN HYPERPIGMENTATION: ICD-10-CM

## 2020-09-02 DIAGNOSIS — L72.8 OTHER FOLLICULAR CYSTS OF THE SKIN AND SUBCUTANEOUS TISSUE: ICD-10-CM | Status: STABLE

## 2020-09-02 PROBLEM — D18.01 HEMANGIOMA OF SKIN AND SUBCUTANEOUS TISSUE: Status: ACTIVE | Noted: 2020-09-02

## 2020-09-02 PROBLEM — D22.62 MELANOCYTIC NEVI OF LEFT UPPER LIMB, INCLUDING SHOULDER: Status: ACTIVE | Noted: 2020-09-02

## 2020-09-02 PROBLEM — D22.72 MELANOCYTIC NEVI OF LEFT LOWER LIMB, INCLUDING HIP: Status: ACTIVE | Noted: 2020-09-02

## 2020-09-02 PROBLEM — D22.5 MELANOCYTIC NEVI OF TRUNK: Status: ACTIVE | Noted: 2020-09-02

## 2020-09-02 PROBLEM — D22.61 MELANOCYTIC NEVI OF RIGHT UPPER LIMB, INCLUDING SHOULDER: Status: ACTIVE | Noted: 2020-09-02

## 2020-09-02 PROBLEM — D22.39 MELANOCYTIC NEVI OF OTHER PARTS OF FACE: Status: ACTIVE | Noted: 2020-09-02

## 2020-09-02 PROBLEM — D22.71 MELANOCYTIC NEVI OF RIGHT LOWER LIMB, INCLUDING HIP: Status: ACTIVE | Noted: 2020-09-02

## 2020-09-02 PROCEDURE — 17000 DESTRUCT PREMALG LESION: CPT

## 2020-09-02 PROCEDURE — ? SUNSCREEN RECOMMENDATIONS

## 2020-09-02 PROCEDURE — ? ADDITIONAL NOTES

## 2020-09-02 PROCEDURE — 99214 OFFICE O/P EST MOD 30 MIN: CPT | Mod: 25

## 2020-09-02 PROCEDURE — 17003 DESTRUCT PREMALG LES 2-14: CPT

## 2020-09-02 PROCEDURE — ? LIQUID NITROGEN

## 2020-09-02 PROCEDURE — ? COUNSELING

## 2020-09-02 ASSESSMENT — LOCATION SIMPLE DESCRIPTION DERM
LOCATION SIMPLE: RIGHT POSTERIOR UPPER ARM
LOCATION SIMPLE: LEFT PRETIBIAL REGION
LOCATION SIMPLE: RIGHT UPPER BACK
LOCATION SIMPLE: RIGHT FOREARM
LOCATION SIMPLE: LEFT ANTERIOR NECK
LOCATION SIMPLE: RIGHT PRETIBIAL REGION
LOCATION SIMPLE: RIGHT FOREHEAD
LOCATION SIMPLE: RIGHT BUTTOCK
LOCATION SIMPLE: LEFT CHEEK
LOCATION SIMPLE: LEFT POSTERIOR UPPER ARM
LOCATION SIMPLE: LEFT POSTERIOR THIGH
LOCATION SIMPLE: UPPER BACK
LOCATION SIMPLE: RIGHT LOWER BACK
LOCATION SIMPLE: RIGHT POSTERIOR THIGH
LOCATION SIMPLE: LEFT FOREARM
LOCATION SIMPLE: RIGHT CHEEK

## 2020-09-02 ASSESSMENT — LOCATION DETAILED DESCRIPTION DERM
LOCATION DETAILED: LEFT INFERIOR MEDIAL MALAR CHEEK
LOCATION DETAILED: RIGHT PROXIMAL PRETIBIAL REGION
LOCATION DETAILED: RIGHT INFERIOR FOREHEAD
LOCATION DETAILED: LEFT CLAVICULAR NECK
LOCATION DETAILED: LEFT VENTRAL PROXIMAL FOREARM
LOCATION DETAILED: RIGHT INFERIOR MEDIAL UPPER BACK
LOCATION DETAILED: RIGHT MEDIAL MALAR CHEEK
LOCATION DETAILED: RIGHT SUPERIOR LATERAL MALAR CHEEK
LOCATION DETAILED: RIGHT DISTAL POSTERIOR UPPER ARM
LOCATION DETAILED: LEFT PROXIMAL POSTERIOR UPPER ARM
LOCATION DETAILED: RIGHT INFERIOR MEDIAL MIDBACK
LOCATION DETAILED: RIGHT PROXIMAL DORSAL FOREARM
LOCATION DETAILED: LEFT SUPERIOR MEDIAL MALAR CHEEK
LOCATION DETAILED: RIGHT SUPERIOR UPPER BACK
LOCATION DETAILED: LEFT DISTAL POSTERIOR THIGH
LOCATION DETAILED: RIGHT DISTAL POSTERIOR THIGH
LOCATION DETAILED: RIGHT VENTRAL PROXIMAL FOREARM
LOCATION DETAILED: LEFT PROXIMAL DORSAL FOREARM
LOCATION DETAILED: INFERIOR THORACIC SPINE
LOCATION DETAILED: RIGHT MEDIAL BUTTOCK
LOCATION DETAILED: RIGHT INFERIOR MEDIAL MALAR CHEEK
LOCATION DETAILED: RIGHT INFERIOR CENTRAL MALAR CHEEK
LOCATION DETAILED: LEFT LATERAL PROXIMAL PRETIBIAL REGION

## 2020-09-02 ASSESSMENT — LOCATION ZONE DERM
LOCATION ZONE: LEG
LOCATION ZONE: NECK
LOCATION ZONE: TRUNK
LOCATION ZONE: FACE
LOCATION ZONE: ARM

## 2020-09-02 NOTE — PROCEDURE: ADDITIONAL NOTES
Additional Notes: Patient has direct line to call if cyst becomes irritated. Patient has had similar lesions treated with IL kenalog in the past with improvement noted (Andria Colorado PA-C)
Detail Level: Detailed

## 2020-09-02 NOTE — PROCEDURE: LIQUID NITROGEN
Detail Level: Detailed
Number Of Freeze-Thaw Cycles: 2 freeze-thaw cycles
Duration Of Freeze Thaw-Cycle (Seconds): 10
Render Post-Care Instructions In Note?: yes
Render Note In Bullet Format When Appropriate: No
Consent: The patient's consent was obtained including but not limited to risks of crusting, scabbing, blistering, scarring, darker or lighter pigmentary change, recurrence, incomplete removal and infection. RTC in 2 months if lesion(s) persistent.
Post-Care Instructions: I reviewed with the patient in detail post-care instructions. Patient is to wear sunprotection, and avoid picking at any of the treated lesions. Pt may apply Vaseline to crusted or scabbing areas.

## 2020-10-08 ENCOUNTER — APPOINTMENT (RX ONLY)
Dept: URBAN - METROPOLITAN AREA CLINIC 20 | Facility: CLINIC | Age: 65
Setting detail: DERMATOLOGY
End: 2020-10-08

## 2020-10-08 ENCOUNTER — RX ONLY (OUTPATIENT)
Age: 65
Setting detail: RX ONLY
End: 2020-10-08

## 2020-10-08 DIAGNOSIS — L91.8 OTHER HYPERTROPHIC DISORDERS OF THE SKIN: ICD-10-CM

## 2020-10-08 DIAGNOSIS — L57.0 ACTINIC KERATOSIS: ICD-10-CM

## 2020-10-08 DIAGNOSIS — Z41.9 ENCOUNTER FOR PROCEDURE FOR PURPOSES OTHER THAN REMEDYING HEALTH STATE, UNSPECIFIED: ICD-10-CM

## 2020-10-08 DIAGNOSIS — L72.0 EPIDERMAL CYST: ICD-10-CM

## 2020-10-08 DIAGNOSIS — L71.8 OTHER ROSACEA: ICD-10-CM

## 2020-10-08 PROCEDURE — 99213 OFFICE O/P EST LOW 20 MIN: CPT

## 2020-10-08 PROCEDURE — ? PRESCRIPTION

## 2020-10-08 PROCEDURE — ? ADDITIONAL NOTES

## 2020-10-08 PROCEDURE — ? PHOTODYNAMIC THERAPY COUNSELING

## 2020-10-08 PROCEDURE — ? COUNSELING

## 2020-10-08 PROCEDURE — ? SCITON BBL

## 2020-10-08 RX ORDER — ACYCLOVIR 400 MG/1
TABLET ORAL
Qty: 15 | Refills: 0 | Status: ERX

## 2020-10-08 RX ORDER — AZELAIC ACID 0.15 G/G
GEL TOPICAL DAILY
Qty: 1 | Refills: 6 | Status: ERX | COMMUNITY
Start: 2020-10-08

## 2020-10-08 RX ADMIN — AZELAIC ACID 1: 0.15 GEL TOPICAL at 00:00

## 2020-10-08 ASSESSMENT — LOCATION ZONE DERM
LOCATION ZONE: ARM
LOCATION ZONE: FACE
LOCATION ZONE: FACE

## 2020-10-08 ASSESSMENT — LOCATION SIMPLE DESCRIPTION DERM
LOCATION SIMPLE: RIGHT FOREHEAD
LOCATION SIMPLE: RIGHT FOREHEAD
LOCATION SIMPLE: LEFT CHEEK
LOCATION SIMPLE: RIGHT CHEEK
LOCATION SIMPLE: LEFT UPPER ARM
LOCATION SIMPLE: RIGHT CHEEK

## 2020-10-08 ASSESSMENT — LOCATION DETAILED DESCRIPTION DERM
LOCATION DETAILED: LEFT ANTERIOR DISTAL UPPER ARM
LOCATION DETAILED: RIGHT INFERIOR PREAURICULAR CHEEK
LOCATION DETAILED: RIGHT MEDIAL FOREHEAD
LOCATION DETAILED: LEFT INFERIOR MEDIAL MALAR CHEEK
LOCATION DETAILED: RIGHT INFERIOR CENTRAL MALAR CHEEK
LOCATION DETAILED: RIGHT CENTRAL MALAR CHEEK
LOCATION DETAILED: RIGHT MEDIAL FOREHEAD
LOCATION DETAILED: RIGHT INFERIOR MEDIAL MALAR CHEEK
LOCATION DETAILED: LEFT INFERIOR CENTRAL MALAR CHEEK

## 2020-10-08 NOTE — PROCEDURE: ADDITIONAL NOTES
Additional Notes: Recommended pt try PDT treatment\\nSubmitting order today for PDT
Detail Level: Detailed
Additional Notes: Cosmetic contact info given
Additional Notes: Provided pt with Elta MD samples

## 2020-10-08 NOTE — PROCEDURE: SCITON BBL
Additional Comments (Optional): 15x15 setting
Pulse Duration Units: milliseconds
Post-Care Instructions: I reviewed with the patient in detail post-care instructions. Patient should stay away from the sun and wear sun protection until treated areas are fully healed.
Treatment Number: 1
Cooling (In C): 20
Repetition Rate (Hz): 10
Spot Size: Finesse Adapter Size: 15 x 15 mm square
Cooling (In C): 15
Cooling ?: Yes
Hide Repetition Rate?: No
Detail Level: Zone
Spot Size: Finesse Adapter Size: 15 x 45 mm (No Finesse Adapter)
Fluence (J/Cm2): 14
Fluence (J/Cm2): 25
Anesthesia Type: 1% lidocaine with epinephrine
Price (Use Numbers Only, No Special Characters Or $): 450.00
Fluence (J/Cm2): 8
Anesthesia Volume In Cc: 0
Preprocedure Text: The treatment areas were thoroughly cleaned. Any exposed at risk hair that was not to be treated was covered in white paper tape. Clear ultrasound gel was applied to the treatment area. The area was treated with no immediate stacking of pulses.
Consent: Written consent obtained, risks reviewed including but not limited to crusting, scabbing, blistering, scarring, darker or lighter pigmentary change, bruising, and/or incomplete response.
Post Procedure Text: The patient tolerated the procedure well. Ice-chilled washclothes were applied to the treatment area for comfort. Post care was reviewed with the patient.

## 2020-10-08 NOTE — HPI: SKIN LESION
Is This A New Presentation, Or A Follow-Up?: Skin Lesions
What Type Of Note Output Would You Prefer (Optional)?: Standard Output
How Severe Is Your Skin Lesion?: mild
Has Your Skin Lesion Been Treated?: been treated
When Was It Treated?: 09/02/2020

## 2020-10-26 ENCOUNTER — RX ONLY (OUTPATIENT)
Age: 65
Setting detail: RX ONLY
End: 2020-10-26

## 2020-10-26 ENCOUNTER — APPOINTMENT (RX ONLY)
Dept: URBAN - METROPOLITAN AREA CLINIC 20 | Facility: CLINIC | Age: 65
Setting detail: DERMATOLOGY
End: 2020-10-26

## 2020-10-26 DIAGNOSIS — L72.8 OTHER FOLLICULAR CYSTS OF THE SKIN AND SUBCUTANEOUS TISSUE: ICD-10-CM

## 2020-10-26 DIAGNOSIS — D69.2 OTHER NONTHROMBOCYTOPENIC PURPURA: ICD-10-CM

## 2020-10-26 DIAGNOSIS — L71.8 OTHER ROSACEA: ICD-10-CM | Status: WORSENING

## 2020-10-26 PROCEDURE — ? COUNSELING

## 2020-10-26 PROCEDURE — ? INTRALESIONAL KENALOG

## 2020-10-26 PROCEDURE — 11900 INJECT SKIN LESIONS </W 7: CPT

## 2020-10-26 PROCEDURE — ? ADDITIONAL NOTES

## 2020-10-26 PROCEDURE — 99214 OFFICE O/P EST MOD 30 MIN: CPT | Mod: 25

## 2020-10-26 RX ORDER — CLINDAMYCIN PHOSPHATE 10 MG/ML
LOTION TOPICAL
Qty: 1 | Refills: 0 | Status: ERX | COMMUNITY
Start: 2020-10-26

## 2020-10-26 RX ORDER — CLINDAMYCIN PHOSPHATE 10 MG/ML
SOLUTION TOPICAL
Qty: 1 | Refills: 1 | Status: CANCELLED
Stop reason: ALTCHOICE

## 2020-10-26 ASSESSMENT — LOCATION DETAILED DESCRIPTION DERM
LOCATION DETAILED: LEFT MEDIAL MALAR CHEEK
LOCATION DETAILED: PERIANAL SKIN
LOCATION DETAILED: LEFT BUTTOCK
LOCATION DETAILED: RIGHT MEDIAL MALAR CHEEK
LOCATION DETAILED: LEFT DISTAL DORSAL FOREARM

## 2020-10-26 ASSESSMENT — LOCATION SIMPLE DESCRIPTION DERM
LOCATION SIMPLE: LEFT FOREARM
LOCATION SIMPLE: LEFT BUTTOCK
LOCATION SIMPLE: RIGHT CHEEK
LOCATION SIMPLE: LEFT CHEEK
LOCATION SIMPLE: PERIANAL SKIN

## 2020-10-26 ASSESSMENT — LOCATION ZONE DERM
LOCATION ZONE: TRUNK
LOCATION ZONE: FACE
LOCATION ZONE: ARM
LOCATION ZONE: ANUS

## 2020-10-26 NOTE — HPI: SKIN LESION
Is This A New Presentation, Or A Follow-Up?: Skin Lesions
What Type Of Note Output Would You Prefer (Optional)?: Standard Output
How Severe Is Your Skin Lesion?: severe
Has Your Skin Lesion Been Treated?: been treated
When Was It Treated?: 2016

## 2020-10-26 NOTE — PROCEDURE: INTRALESIONAL KENALOG
Include Z78.9 (Other Specified Conditions Influencing Health Status) As An Associated Diagnosis?: Yes
Kenalog Preparation: Kenalog
Lot # For Kenalog (Optional): [id number]
Total Volume (Ccs): 1
Detail Level: Detailed
Medical Necessity Clause: This procedure was medically necessary because the lesions that were treated were:
Expiration Date For Kenalog (Optional): 03/2022
Administered By (Optional): Siena STARK
Concentration Of Kenalog Solution Injected (Mg/Ml): 5.0
Consent: The risks of atrophy, bleeding, scarring and infection were reviewed with the patient. Re-treatment may be necessary.
X Size Of Lesion In Cm (Optional): 0

## 2020-10-26 NOTE — PROCEDURE: ADDITIONAL NOTES
Additional Notes: Patient has Rx for finacea
Detail Level: Detailed
Additional Notes: Pt will begin Finacea-has to go to pharmacy to 
Additional Notes: Sent in Clindamycin topical to use bid prn

## 2020-11-05 ENCOUNTER — TELEMEDICINE (OUTPATIENT)
Dept: CARDIOLOGY | Facility: MEDICAL CENTER | Age: 65
End: 2020-11-05
Payer: MEDICARE

## 2020-11-05 VITALS
SYSTOLIC BLOOD PRESSURE: 105 MMHG | OXYGEN SATURATION: 97 % | WEIGHT: 152 LBS | HEIGHT: 72 IN | BODY MASS INDEX: 20.59 KG/M2 | HEART RATE: 110 BPM | DIASTOLIC BLOOD PRESSURE: 60 MMHG

## 2020-11-05 DIAGNOSIS — K50.019 CROHN'S DISEASE OF SMALL INTESTINE WITH COMPLICATION (HCC): ICD-10-CM

## 2020-11-05 DIAGNOSIS — E87.6 HYPOKALEMIA: ICD-10-CM

## 2020-11-05 DIAGNOSIS — I47.10 PSVT (PAROXYSMAL SUPRAVENTRICULAR TACHYCARDIA) (HCC): ICD-10-CM

## 2020-11-05 DIAGNOSIS — R00.2 PALPITATIONS: ICD-10-CM

## 2020-11-05 DIAGNOSIS — I48.0 PAROXYSMAL ATRIAL FIBRILLATION (HCC): ICD-10-CM

## 2020-11-05 PROCEDURE — 99214 OFFICE O/P EST MOD 30 MIN: CPT | Mod: 95,CR | Performed by: PHYSICIAN ASSISTANT

## 2020-11-05 ASSESSMENT — FIBROSIS 4 INDEX: FIB4 SCORE: 2.59

## 2020-11-05 ASSESSMENT — ENCOUNTER SYMPTOMS
ABDOMINAL PAIN: 1
PALPITATIONS: 1
FEVER: 0
CHILLS: 0
DIZZINESS: 1
PND: 0
ORTHOPNEA: 0
NAUSEA: 1
COUGH: 0
DEPRESSION: 0
SHORTNESS OF BREATH: 0

## 2020-11-05 NOTE — PROGRESS NOTES
"Chief Complaint   Patient presents with   • Atrial Fibrillation     paroxysmal       This evaluation was conducted via Yedda using secure and encrypted videoconferencing technology. The patient was in a private location in the state of [place].    The patient's identity was confirmed and verbal consent was obtained for this virtual visit.    Subjective:   Jana Overton is a 65 y.o. female who presents today for follow-up.    Patient of Dr. Monahan.  Current medical problems include hypertension,  DVT, paroxysmal atrial fibrillation not on chronic anticoagulation, TRUDY, COPD, and crohn's disease s/p ileostomy who is here for follow-up.    She saw Dr. Monahan in August and reported no cardiac complaints.     She scheduled the appointment today to discuss rapid heart rates.     In the last month or two she experiences paroxysms of fast heart rates, up to 180 that she can measure on her pulse ox. These episodes last 25-30 min and are associated with symptoms of \"heat stroke\" including drenching sweat and nausea. She sometimes takes an extra metoprolol for the episodes. The episodes usually occur with exertion.     Past Medical History:   Diagnosis Date   • Anesthesia     difficult Iv stick   • Anxiety    • Arthritis     all over   • ASTHMA    • Atrial fib/flut    • Backpain    • Breath shortness    • Bronchitis     5 years   • Chronic pain    • Colostomy in place ([facility]) 10/14/2010   • COPD (chronic obstructive pulmonary disease) ([facility]) 6/24/2014   • COPD (chronic obstructive pulmonary disease) ([facility]) 6/24/2014   • Crohn's disease of colon ([facility])    • Dyspnea    • History of cardiac murmur    • Hypokalemia 6/24/2014   • Indigestion    • Infectious disease     MRSA, VancoRSA   • Multiple falls 6/24/2014   • Narcotic dependence ([facility]) 9/23/2009   • Obstruction    • Other specified disorder of intestines     crohns disease   • Pain 7/11/13    all over   • Pneumonia     child and mid 30's   • Postherpetic neuralgia 6/24/2014   • " Rosacea 6/24/2014   • Sleep apnea          Past Surgical History:   Procedure Laterality Date   • GASTROSCOPY N/A 5/22/2019    Procedure: GASTROSCOPY - WITH DILATION;  Surgeon: Dionicio Cardona M.D.;  Location: [facility];  Service: Gastroenterology   • GASTROSCOPY  1/6/2019    Procedure: GASTROSCOPY- WITH DILATION;  Surgeon: Daniel Daniels M.D.;  Location: SURGERY [facility];  Service: Gastroenterology   • ILEOSTOMY  12/29/2018    Procedure: ILEOSTOMY- REVISION;  Surgeon: Kevin Cruz M.D.;  Location: [facility];  Service: General   • SIGMOIDOSCOPY FLEX  12/29/2018    Procedure: SIGMOIDOSCOPY FLEX;  Surgeon: Kevin Cruz M.D.;  Location: [facility];  Service: General   • EXPLORATORY LAPAROTOMY  12/29/2018    Procedure: EXPLORATORY LAPAROTOMY, lysis of adhesions;  Surgeon: Kevin Cruz M.D.;  Location: [facility];  Service: General   • ILEOSTOMY  5/14/2014    Performed by Kevin Cruz M.D. at SURGERY [facility]   • MAMMOPLASTY REDUCTION  7/17/2013    Performed by Janey Burt M.D. at [facility]   • HARDWARE REMOVAL NEURO  7/16/2012    Performed by KEELEY KIM at [facility]   • GASTROSCOPY  10/4/2011    Performed by TYSON MARTINEZ at SURGERY SAME DAY [facility]   • COLONOSCOPY  10/4/2011    Performed by TYSON MARTINEZ at SURGERY SAME DAY [facility]   • DILATION AND CURETTAGE  9/24/2010    Performed by GAURAV ALY at SURGERY SAME DAY [facility]   • ILEOSTOMY  11/11/2009    Performed by KEVIN CRUZ at [facility]   • GASTROSCOPY WITH BIOPSY  11/22/08    Performed by NEGRITA HUYNH at [facility]   • ABDOMINAL EXPLORATION     • CERVICAL DISK AND FUSION ANTERIOR     • COLON RESECTION     • FOOT SURGERY     • HAND SURGERY     • LUMBAR FUSION ANTERIOR     • LUMPECTOMY     • OTHER ABDOMINAL SURGERY      surgery for chrons disease   • PB REDUCTION OF LARGE BREAST            Family History   Problem Relation Age of Onset   • Lung Disease Mother         copd   • Diabetes Father    • Heart Disease Father    • Diabetes Sister    • Cancer Maternal Aunt         breast         Social History     Tobacco Use   Smoking Status Never Smoker   Smokeless Tobacco Never Used   Tobacco Comment    second hand smoke          Social History     Substance and Sexual Activity   Alcohol Use Yes         Allergies   Allergen Reactions   • Azathioprine Sodium Hives   • Carafate [Sucralfate] Rash     Generalizes/Mouth Sores   • Infliximab Hives   • Morphine Swelling   • Roxanol [Morphine Sulfate] Swelling     Throat swells   • Amitriptyline Unspecified     Nightmares     • Demerol Vomiting   • Fentanyl Unspecified     Patches caused skin breakdown, no pain relief   • Lorazepam Unspecified     States give me nightmares.    • Methadone    • Methotrexate Hives and Rash   • Pregabalin Rash     Rash     • Pseudoephedrine Vomiting   • Sulfa Drugs Hives   • Tape Rash     Skin peels off; paper tape   • Lyrica    • Promethazine    • Celestone [Betamethasone Sodium Phosphate] Unspecified     Pt unable to remember   • Sulfasalazine Unspecified     Pt unable to remember   • Tizanidine Hydrochloride Unspecified     Pt unable to remember         Outpatient Encounter Medications as of 11/5/2020   Medication Sig Dispense Refill   • metoprolol (LOPRESSOR) 50 MG Tab TAKE 1 TABLET BY MOUTH TWICE DAILY. HOLD IF BLOOD PRESSURE LESS THAN 95/50 180 Tab 3   • cetirizine (ZYRTEC) 10 MG Tab Take 10 mg by mouth every day.     • ONDANSETRON HCL PO Take 2 mg by mouth as needed.     • torsemide (DEMADEX) 10 MG tablet Take 1 Tab by mouth 1 time daily as needed.     • spironolactone (ALDACTONE) 25 MG Tab Take 2 Tabs by mouth every day.     • POTASSIUM PO Take 10 mEq by mouth as needed.     • Probiotic Product (PROBIOTIC PO) Take  by mouth.     • hydrALAZINE (APRESOLINE) 10 MG Tab Take 10 mg by mouth 3 times a day as needed (High BP).  For SBP >155     • prochlorperazine (COMPAZINE) 10 MG Tab Take 10 mg by mouth every 6 hours as needed for Nausea/Vomiting.     • aspirin (ASA) 81 MG Chew Tab chewable tablet Take 81 mg by mouth every day.     • albuterol (VENTOLIN OR PROVENTIL) 108 (90 BASE) MCG/ACT Aero Soln Inhale 2 Puffs by mouth every 6 hours as needed.     • oxycodone 20 MG/ML CONC Take 50-80 mg by mouth every four hours as needed (Pain). 20mg/ml solution  Pt states only one bottle     • diazePAM (VALIUM) 5 MG Tab Take 5 mg by mouth every 6 hours as needed for Anxiety.       No facility-administered encounter medications on file as of 11/5/2020.          Review of Systems   Constitutional: Negative for chills and fever.        No unexpected weight changes   Respiratory: Negative for cough and shortness of breath.    Cardiovascular: Positive for palpitations and leg swelling. Negative for chest pain, orthopnea and PND.   Gastrointestinal: Positive for abdominal pain and nausea.   Neurological: Positive for dizziness.   Psychiatric/Behavioral: Negative for depression.      Objective:   /60 (BP Location: Left arm, Patient Position: Sitting, BP Cuff Size: Adult)   Pulse (!) 110   Ht 1.829 m (6')   Wt 68.9 kg (152 lb)   SpO2 97%   BMI 20.61 kg/m²     Physical Exam  Vitals signs reviewed.   Constitutional:       General: She is not in acute distress.     Appearance: She is not ill-appearing.   HENT:      Head: Normocephalic and atraumatic.   Cardiovascular:      Comments: Per pt, rapid heart rate  Pulmonary:      Effort: No respiratory distress.   Musculoskeletal:      Comments: No obvious pitting edema, venous varicosities    Neurological:      General: No focal deficit present.      Mental Status: She is alert.       TTE (12/27/2017):  CONCLUSIONS  1. Normal right and left ventricular size and function. Normal regional wall motion  2. Abnormal septal motion consistent with RV volume overload, however estimated right atrial pressure by  IVC assessment is normal  3. No significant valve disease or flow abnormalities.   4. Unable to estimate pulmonary artery pressure due to an inadequate tricuspid regurgitant jet.    Compared to the images of the study done 4/3/13 - there has been no significant change.      Zio patch 1/3/18  - 19sec of possible atrial fibrillation  - symptomatic SVT     Cardiac PET 2/5/2018  ELECTROCARDIOGRAPHIC FINDINGS:  EKG review shows a normal sinus rhythm with no   ischemic ST segment changes at rest or stress.     SCINTOGRAPHIC FINDINGS:  There is normal homogenous tracer uptake at rest and   stress.     GATED WALL MOTION FINDINGS:  Show an ejection fraction of 72% at rest, which increases to 78% at peak stress.     CONCLUSIONS AND IMPRESSION:  Normal cardiac stress test.     Assessment:     1. Palpitations  RIH ZIO PATCH MONITOR    Basic Metabolic Panel    TSH WITH REFLEX TO FT4    MAGNESIUM   2. Hypokalemia  Basic Metabolic Panel    MAGNESIUM   3. Paroxysmal atrial fibrillation (HCC)     4. PSVT (paroxysmal supraventricular tachycardia) (HCC)     5. Crohn's disease of small intestine with complication (HCC)          Medical Decision Making:  Today's Assessment / Plan:   Palpitations  Atrial fibrillation, paroxysmal  PSVT  - zio patch  - [id number] 3 (age, gender, HTN since she is in [place] 50mg). Previously has declined anticoagulation given her history of crohns, and she has opted for only anticoagulation with aspirin. Will readdress this if her burden has increased  -continue metoprolol to 50mg PO bid for rate control with PRN metoprolol for palpitations     HTN, controlled  -cont spironolactone 50mg  -hydralazine 10mg PO prn SBP >150  -continue metoprolol.       Leg swelling  - 2/2 lymphedema vs. Venous insufficiency   -continue spironolactone 50mg daily.   - using Torsemide/potassium PRN      TRUDY  -continue CPAP, many of symptoms much improved.     Check BMP, TSH. Place zio patch to confirm if afib vs. PSVT (?AVNRT)

## 2020-11-10 ENCOUNTER — NON-PROVIDER VISIT (OUTPATIENT)
Dept: CARDIOLOGY | Facility: MEDICAL CENTER | Age: 65
End: 2020-11-10
Attending: PHYSICIAN ASSISTANT
Payer: MEDICARE

## 2020-11-10 ENCOUNTER — APPOINTMENT (RX ONLY)
Dept: URBAN - METROPOLITAN AREA CLINIC 20 | Facility: CLINIC | Age: 65
Setting detail: DERMATOLOGY
End: 2020-11-10

## 2020-11-10 ENCOUNTER — TELEPHONE (OUTPATIENT)
Dept: CARDIOLOGY | Facility: MEDICAL CENTER | Age: 65
End: 2020-11-10

## 2020-11-10 DIAGNOSIS — L72.8 OTHER FOLLICULAR CYSTS OF THE SKIN AND SUBCUTANEOUS TISSUE: ICD-10-CM

## 2020-11-10 DIAGNOSIS — R00.2 PALPITATIONS: ICD-10-CM

## 2020-11-10 DIAGNOSIS — L71.8 OTHER ROSACEA: ICD-10-CM | Status: INADEQUATELY CONTROLLED

## 2020-11-10 DIAGNOSIS — I47.10 SVT (SUPRAVENTRICULAR TACHYCARDIA) (HCC): ICD-10-CM

## 2020-11-10 DIAGNOSIS — Z41.9 ENCOUNTER FOR PROCEDURE FOR PURPOSES OTHER THAN REMEDYING HEALTH STATE, UNSPECIFIED: ICD-10-CM

## 2020-11-10 PROCEDURE — ? SCITON BBL

## 2020-11-10 PROCEDURE — ? INTRALESIONAL KENALOG

## 2020-11-10 PROCEDURE — 11900 INJECT SKIN LESIONS </W 7: CPT

## 2020-11-10 PROCEDURE — 0298T PR EXT ECG > 48HR TO 21 DAY REVIEW AND INTERPRETATN: CPT | Performed by: INTERNAL MEDICINE

## 2020-11-10 PROCEDURE — ? COUNSELING

## 2020-11-10 PROCEDURE — ? ADDITIONAL NOTES

## 2020-11-10 PROCEDURE — 99213 OFFICE O/P EST LOW 20 MIN: CPT | Mod: 25

## 2020-11-10 ASSESSMENT — LOCATION SIMPLE DESCRIPTION DERM
LOCATION SIMPLE: LEFT CHEEK
LOCATION SIMPLE: LEFT BUTTOCK
LOCATION SIMPLE: INFERIOR FOREHEAD
LOCATION SIMPLE: RIGHT CHEEK
LOCATION SIMPLE: RIGHT CHEEK
LOCATION SIMPLE: PERIANAL SKIN
LOCATION SIMPLE: LEFT CHEEK
LOCATION SIMPLE: LEFT LIP

## 2020-11-10 ASSESSMENT — LOCATION ZONE DERM
LOCATION ZONE: ANUS
LOCATION ZONE: TRUNK
LOCATION ZONE: LIP
LOCATION ZONE: FACE
LOCATION ZONE: FACE

## 2020-11-10 ASSESSMENT — LOCATION DETAILED DESCRIPTION DERM
LOCATION DETAILED: RIGHT INFERIOR CENTRAL MALAR CHEEK
LOCATION DETAILED: LEFT LOWER CUTANEOUS LIP
LOCATION DETAILED: LEFT CENTRAL MALAR CHEEK
LOCATION DETAILED: INFERIOR MID FOREHEAD
LOCATION DETAILED: LEFT BUTTOCK
LOCATION DETAILED: PERIANAL SKIN
LOCATION DETAILED: RIGHT INFERIOR CENTRAL MALAR CHEEK
LOCATION DETAILED: LEFT INFERIOR CENTRAL MALAR CHEEK

## 2020-11-10 NOTE — PROCEDURE: INTRALESIONAL KENALOG
Include Z78.9 (Other Specified Conditions Influencing Health Status) As An Associated Diagnosis?: Yes
Kenalog Preparation: Kenalog
Lot # For Kenalog (Optional): [id number]
Total Volume (Ccs): 1
Detail Level: Detailed
Treatment Number (Optional): 2
Medical Necessity Clause: This procedure was medically necessary because the lesions that were treated were:
Expiration Date For Kenalog (Optional): 03/2022
Administered By (Optional): Siena STARK
Concentration Of Kenalog Solution Injected (Mg/Ml): 5.0
Consent: The risks of atrophy, bleeding, scarring and infection were reviewed with the patient. Re-treatment may be necessary.
X Size Of Lesion In Cm (Optional): 0

## 2020-11-10 NOTE — PROCEDURE: SCITON BBL
Spot Size: Finesse Adapter Size: 15 x 45 mm (No Finesse Adapter)
Detail Level: Zone
Pulse Duration Units: milliseconds
Cooling (In C): 18
Repetition Rate (Hz): 10
Treatment Number: 2
Additional Comments (Optional): same setting using 15x15
Fluence (J/Cm2): 8
Passes: 1
Cooling (In C): 15
Fluence (J/Cm2): 13
Price (Use Numbers Only, No Special Characters Or $): 450.00
Spot Size: Finesse Adapter Size: 15 x 15 mm square
Pulse Duration: 20
Pulse Duration: 25
Cooling ?: Yes
Location Override (Will Not Show Above If Text Entered): spot treat
Anesthesia Volume In Cc: 0
Post-Care Instructions: I reviewed with the patient in detail post-care instructions. Patient should stay away from the sun and wear sun protection until treated areas are fully healed.
Preprocedure Text: The treatment areas were thoroughly cleaned. Any exposed at risk hair that was not to be treated was covered in white paper tape. Clear ultrasound gel was applied to the treatment area. The area was treated with no immediate stacking of pulses.
Indication Override (Will Not Show Above If Text Entered): vascular
Additional Comments (Optional): settings using 15x15
Pulse Duration: 30
Hide Repetition Rate?: No
Post Procedure Text: The patient tolerated the procedure well. Ice-chilled washclothes were applied to the treatment area for comfort. Post care was reviewed with the patient.
Consent: Written consent obtained, risks reviewed including but not limited to crusting, scabbing, blistering, scarring, darker or lighter pigmentary change, bruising, and/or incomplete response.

## 2020-11-10 NOTE — PROCEDURE: ADDITIONAL NOTES
Detail Level: Detailed
Additional Notes: Pt reports improvement with face laser treatments and Finacea.
Additional Notes: Pt has had previous treatments with ILTAC with significant improvement over the years. Consider referral to general surgeon if above plan fails. Continue with sitz baths, keep area clean. May use Desitin zinc barrier cream.

## 2020-11-10 NOTE — TELEPHONE ENCOUNTER
Home enrollment completed for the 14 day Zio patch program per Latoya Oh PA-C.  Monitor to be mailed to patient by [employer].    >Currently pending EOS.

## 2020-11-24 ENCOUNTER — HOSPITAL ENCOUNTER (OUTPATIENT)
Dept: LAB | Facility: MEDICAL CENTER | Age: 65
End: 2020-11-24
Attending: PODIATRIST
Payer: MEDICARE

## 2020-11-24 LAB
25(OH)D3 SERPL-MCNC: 32 NG/ML (ref 30–100)
ALBUMIN SERPL BCP-MCNC: 4.3 G/DL (ref 3.2–4.9)
ALBUMIN/GLOB SERPL: 1.2 G/DL
ALP SERPL-CCNC: 123 U/L (ref 30–99)
ALT SERPL-CCNC: 14 U/L (ref 2–50)
ANION GAP SERPL CALC-SCNC: 12 MMOL/L (ref 7–16)
AST SERPL-CCNC: 22 U/L (ref 12–45)
BASOPHILS # BLD AUTO: 0.8 % (ref 0–1.8)
BASOPHILS # BLD: 0.07 K/UL (ref 0–0.12)
BILIRUB SERPL-MCNC: 0.5 MG/DL (ref 0.1–1.5)
BUN SERPL-MCNC: 32 MG/DL (ref 8–22)
CALCIUM SERPL-MCNC: 10 MG/DL (ref 8.4–10.2)
CHLORIDE SERPL-SCNC: 99 MMOL/L (ref 96–112)
CO2 SERPL-SCNC: 26 MMOL/L (ref 20–33)
CREAT SERPL-MCNC: 1.15 MG/DL (ref 0.5–1.4)
EOSINOPHIL # BLD AUTO: 0.39 K/UL (ref 0–0.51)
EOSINOPHIL NFR BLD: 4.6 % (ref 0–6.9)
ERYTHROCYTE [DISTWIDTH] IN BLOOD BY AUTOMATED COUNT: 41.2 FL (ref 35.9–50)
ERYTHROCYTE [SEDIMENTATION RATE] IN BLOOD BY WESTERGREN METHOD: 39 MM/HOUR (ref 0–30)
FASTING STATUS PATIENT QL REPORTED: NORMAL
GLOBULIN SER CALC-MCNC: 3.6 G/DL (ref 1.9–3.5)
GLUCOSE SERPL-MCNC: 103 MG/DL (ref 65–99)
HCT VFR BLD AUTO: 44.5 % (ref 37–47)
HGB BLD-MCNC: 14.5 G/DL (ref 12–16)
IMM GRANULOCYTES # BLD AUTO: 0.03 K/UL (ref 0–0.11)
IMM GRANULOCYTES NFR BLD AUTO: 0.4 % (ref 0–0.9)
LYMPHOCYTES # BLD AUTO: 1.63 K/UL (ref 1–4.8)
LYMPHOCYTES NFR BLD: 19.4 % (ref 22–41)
MCH RBC QN AUTO: 30.9 PG (ref 27–33)
MCHC RBC AUTO-ENTMCNC: 32.6 G/DL (ref 33.6–35)
MCV RBC AUTO: 94.9 FL (ref 81.4–97.8)
MONOCYTES # BLD AUTO: 0.7 K/UL (ref 0–0.85)
MONOCYTES NFR BLD AUTO: 8.3 % (ref 0–13.4)
NEUTROPHILS # BLD AUTO: 5.59 K/UL (ref 2–7.15)
NEUTROPHILS NFR BLD: 66.5 % (ref 44–72)
NRBC # BLD AUTO: 0 K/UL
NRBC BLD-RTO: 0 /100 WBC
PLATELET # BLD AUTO: 215 K/UL (ref 164–446)
PMV BLD AUTO: 10.8 FL (ref 9–12.9)
POTASSIUM SERPL-SCNC: 4.2 MMOL/L (ref 3.6–5.5)
PROT SERPL-MCNC: 7.9 G/DL (ref 6–8.2)
RBC # BLD AUTO: 4.69 M/UL (ref 4.2–5.4)
SODIUM SERPL-SCNC: 137 MMOL/L (ref 135–145)
VIT B12 SERPL-MCNC: 591 PG/ML (ref 211–911)
WBC # BLD AUTO: 8.4 K/UL (ref 4.8–10.8)

## 2020-11-24 PROCEDURE — 85025 COMPLETE CBC W/AUTO DIFF WBC: CPT

## 2020-11-24 PROCEDURE — 82306 VITAMIN D 25 HYDROXY: CPT | Mod: GA

## 2020-11-24 PROCEDURE — 36415 COLL VENOUS BLD VENIPUNCTURE: CPT

## 2020-11-24 PROCEDURE — 85652 RBC SED RATE AUTOMATED: CPT

## 2020-11-24 PROCEDURE — 80053 COMPREHEN METABOLIC PANEL: CPT

## 2020-11-24 PROCEDURE — 82607 VITAMIN B-12: CPT

## 2020-11-25 ENCOUNTER — APPOINTMENT (RX ONLY)
Dept: URBAN - METROPOLITAN AREA CLINIC 20 | Facility: CLINIC | Age: 65
Setting detail: DERMATOLOGY
End: 2020-11-25

## 2020-11-25 DIAGNOSIS — L72.8 OTHER FOLLICULAR CYSTS OF THE SKIN AND SUBCUTANEOUS TISSUE: ICD-10-CM

## 2020-11-25 PROCEDURE — 11900 INJECT SKIN LESIONS </W 7: CPT

## 2020-11-25 PROCEDURE — ? COUNSELING

## 2020-11-25 PROCEDURE — ? ADDITIONAL NOTES

## 2020-11-25 PROCEDURE — ? INTRALESIONAL KENALOG

## 2020-11-25 ASSESSMENT — LOCATION ZONE DERM
LOCATION ZONE: ANUS
LOCATION ZONE: TRUNK

## 2020-11-25 ASSESSMENT — LOCATION DETAILED DESCRIPTION DERM
LOCATION DETAILED: LEFT BUTTOCK
LOCATION DETAILED: PERIANAL SKIN

## 2020-11-25 ASSESSMENT — LOCATION SIMPLE DESCRIPTION DERM
LOCATION SIMPLE: LEFT BUTTOCK
LOCATION SIMPLE: PERIANAL SKIN

## 2020-11-25 NOTE — PROCEDURE: ADDITIONAL NOTES
Additional Notes: Pt has had previous treatments with ILTAC with significant improvement over the years. Consider referral to general surgeon if above plan fails. Continue with sitz baths, keep area clean. May use Desitin zinc barrier cream.
Detail Level: Detailed

## 2020-12-01 ENCOUNTER — TELEPHONE (OUTPATIENT)
Dept: CARDIOLOGY | Facility: MEDICAL CENTER | Age: 65
End: 2020-12-01

## 2020-12-01 NOTE — TELEPHONE ENCOUNTER
Pt called, panicked. Stating her Bp is 38/70 and 39/100. Pt not talking like she is thinking clearly.Pt states she feels tingly.  Advised pt to lay down, put feet up and put  on phone. Pt states BP 92/36 83 and 100/38. Last took meds this am. Pt states just got a toresmide (??toredol??) shot at PCP office bc she got attacked by dogs and is in pain, also states she gained 10# last night is dehydrated with swelling. Advised call [facility]. She says she doesn't like them, they dont help and they wear morgue coats. Advised she needs to call, She agreed and will go to .

## 2020-12-14 ENCOUNTER — TELEPHONE (OUTPATIENT)
Dept: CARDIOLOGY | Facility: MEDICAL CENTER | Age: 65
End: 2020-12-14

## 2020-12-14 NOTE — TELEPHONE ENCOUNTER
Attempted to called patient needing to confirmed if patient had her labs. Patient was not availabel . Left message with a family member to please returned our phone call need it to ask patient couple of questions prior to her apt.

## 2020-12-15 NOTE — TELEPHONE ENCOUNTER
PH: [phone]   ALT PH: [phone]   PT NM: Jana Overton   : 1955   RE: Is returning call to answer  questions for office in regards to zio  patch.   --------------------------------------  Message History  Account: [id number]  Taken:  Mon 14-Dec-2020  3:39p   Serial#: [id number]

## 2020-12-16 ENCOUNTER — OFFICE VISIT (OUTPATIENT)
Dept: MEDICAL GROUP | Facility: LAB | Age: 65
End: 2020-12-16
Payer: MEDICARE

## 2020-12-16 VITALS
OXYGEN SATURATION: 93 % | SYSTOLIC BLOOD PRESSURE: 148 MMHG | HEART RATE: 94 BPM | BODY MASS INDEX: 22.27 KG/M2 | WEIGHT: 164.4 LBS | TEMPERATURE: 97.9 F | HEIGHT: 72 IN | DIASTOLIC BLOOD PRESSURE: 80 MMHG | RESPIRATION RATE: 14 BRPM

## 2020-12-16 DIAGNOSIS — F06.8 ANXIETY DISORDER DUE TO MULTIPLE MEDICAL PROBLEMS: ICD-10-CM

## 2020-12-16 DIAGNOSIS — K50.019 CROHN'S DISEASE OF SMALL INTESTINE WITH COMPLICATION (HCC): ICD-10-CM

## 2020-12-16 DIAGNOSIS — L94.3 SCLERODACTYLY: ICD-10-CM

## 2020-12-16 PROCEDURE — 99204 OFFICE O/P NEW MOD 45 MIN: CPT | Performed by: INTERNAL MEDICINE

## 2020-12-16 RX ORDER — OXYCODONE HYDROCHLORIDE 10 MG/1
TABLET ORAL
COMMUNITY
Start: 2020-12-09 | End: 2021-01-15

## 2020-12-16 RX ORDER — IPRATROPIUM BROMIDE AND ALBUTEROL SULFATE 2.5; .5 MG/3ML; MG/3ML
3 SOLUTION RESPIRATORY (INHALATION) 4 TIMES DAILY
Status: ON HOLD | COMMUNITY
End: 2021-01-20

## 2020-12-16 RX ORDER — ESTRADIOL 0.1 MG/G
CREAM VAGINAL DAILY
COMMUNITY
End: 2021-01-15

## 2020-12-16 RX ORDER — GRANISETRON HYDROCHLORIDE 1 MG/1
1 TABLET, FILM COATED ORAL EVERY 12 HOURS PRN
Status: ON HOLD | COMMUNITY
End: 2021-01-20

## 2020-12-16 RX ORDER — IPRATROPIUM BROMIDE 21 UG/1
2 SPRAY, METERED NASAL EVERY 12 HOURS
COMMUNITY
End: 2021-01-15

## 2020-12-16 RX ORDER — FLUTICASONE PROPIONATE 50 MCG
1 SPRAY, SUSPENSION (ML) NASAL DAILY
COMMUNITY
End: 2021-01-15

## 2020-12-16 RX ORDER — NARATRIPTAN 2.5 MG/1
TABLET ORAL PRN
Status: ON HOLD | COMMUNITY
Start: 2020-11-18 | End: 2021-01-20

## 2020-12-16 RX ORDER — CODEINE PHOSPHATE AND GUAIFENESIN 10; 100 MG/5ML; MG/5ML
LIQUID ORAL
COMMUNITY
Start: 2020-11-11 | End: 2021-01-15

## 2020-12-16 RX ORDER — ONDANSETRON 4 MG/1
4-8 TABLET, ORALLY DISINTEGRATING ORAL PRN
Status: ON HOLD | COMMUNITY
Start: 2020-11-18 | End: 2021-01-20

## 2020-12-16 ASSESSMENT — FIBROSIS 4 INDEX: FIB4 SCORE: 1.83

## 2020-12-16 ASSESSMENT — PATIENT HEALTH QUESTIONNAIRE - PHQ9: CLINICAL INTERPRETATION OF PHQ2 SCORE: 0

## 2020-12-16 NOTE — TELEPHONE ENCOUNTER
12/16/2020  Attempted to called patient no answer left message to please called us back if she had any questions.

## 2020-12-17 ENCOUNTER — HOSPITAL ENCOUNTER (OUTPATIENT)
Dept: LAB | Facility: MEDICAL CENTER | Age: 65
End: 2020-12-17
Attending: INTERNAL MEDICINE
Payer: MEDICARE

## 2020-12-17 DIAGNOSIS — L94.3 SCLERODACTYLY: ICD-10-CM

## 2020-12-17 DIAGNOSIS — K50.019 CROHN'S DISEASE OF SMALL INTESTINE WITH COMPLICATION (HCC): ICD-10-CM

## 2020-12-17 DIAGNOSIS — F06.8 ANXIETY DISORDER DUE TO MULTIPLE MEDICAL PROBLEMS: ICD-10-CM

## 2020-12-17 LAB
ALBUMIN SERPL BCP-MCNC: 4.1 G/DL (ref 3.2–4.9)
ALBUMIN/GLOB SERPL: 1 G/DL
ALP SERPL-CCNC: 118 U/L (ref 30–99)
ALT SERPL-CCNC: 18 U/L (ref 2–50)
ANION GAP SERPL CALC-SCNC: 12 MMOL/L (ref 7–16)
AST SERPL-CCNC: 25 U/L (ref 12–45)
BASOPHILS # BLD AUTO: 0.9 % (ref 0–1.8)
BASOPHILS # BLD: 0.07 K/UL (ref 0–0.12)
BILIRUB SERPL-MCNC: 0.5 MG/DL (ref 0.1–1.5)
BUN SERPL-MCNC: 26 MG/DL (ref 8–22)
CALCIUM SERPL-MCNC: 10.2 MG/DL (ref 8.4–10.2)
CHLORIDE SERPL-SCNC: 102 MMOL/L (ref 96–112)
CO2 SERPL-SCNC: 23 MMOL/L (ref 20–33)
CREAT SERPL-MCNC: 1.07 MG/DL (ref 0.5–1.4)
EOSINOPHIL # BLD AUTO: 0.24 K/UL (ref 0–0.51)
EOSINOPHIL NFR BLD: 3.1 % (ref 0–6.9)
ERYTHROCYTE [DISTWIDTH] IN BLOOD BY AUTOMATED COUNT: 42.9 FL (ref 35.9–50)
ERYTHROCYTE [SEDIMENTATION RATE] IN BLOOD BY WESTERGREN METHOD: 37 MM/HOUR (ref 0–30)
GLOBULIN SER CALC-MCNC: 4 G/DL (ref 1.9–3.5)
GLUCOSE SERPL-MCNC: 97 MG/DL (ref 65–99)
HCT VFR BLD AUTO: 43.4 % (ref 37–47)
HGB BLD-MCNC: 14.3 G/DL (ref 12–16)
IMM GRANULOCYTES # BLD AUTO: 0.02 K/UL (ref 0–0.11)
IMM GRANULOCYTES NFR BLD AUTO: 0.3 % (ref 0–0.9)
LYMPHOCYTES # BLD AUTO: 1.63 K/UL (ref 1–4.8)
LYMPHOCYTES NFR BLD: 21 % (ref 22–41)
MCH RBC QN AUTO: 31 PG (ref 27–33)
MCHC RBC AUTO-ENTMCNC: 32.9 G/DL (ref 33.6–35)
MCV RBC AUTO: 94.1 FL (ref 81.4–97.8)
MONOCYTES # BLD AUTO: 0.63 K/UL (ref 0–0.85)
MONOCYTES NFR BLD AUTO: 8.1 % (ref 0–13.4)
NEUTROPHILS # BLD AUTO: 5.16 K/UL (ref 2–7.15)
NEUTROPHILS NFR BLD: 66.6 % (ref 44–72)
NRBC # BLD AUTO: 0 K/UL
NRBC BLD-RTO: 0 /100 WBC
PLATELET # BLD AUTO: 229 K/UL (ref 164–446)
PMV BLD AUTO: 10.2 FL (ref 9–12.9)
POTASSIUM SERPL-SCNC: 4.4 MMOL/L (ref 3.6–5.5)
PROT SERPL-MCNC: 8.1 G/DL (ref 6–8.2)
RBC # BLD AUTO: 4.61 M/UL (ref 4.2–5.4)
RHEUMATOID FACT SER IA-ACNC: 10 IU/ML (ref 0–14)
SODIUM SERPL-SCNC: 137 MMOL/L (ref 135–145)
WBC # BLD AUTO: 7.8 K/UL (ref 4.8–10.8)

## 2020-12-17 PROCEDURE — 83516 IMMUNOASSAY NONANTIBODY: CPT

## 2020-12-17 PROCEDURE — 86256 FLUORESCENT ANTIBODY TITER: CPT

## 2020-12-17 PROCEDURE — 86225 DNA ANTIBODY NATIVE: CPT

## 2020-12-17 PROCEDURE — 85025 COMPLETE CBC W/AUTO DIFF WBC: CPT

## 2020-12-17 PROCEDURE — 86038 ANTINUCLEAR ANTIBODIES: CPT

## 2020-12-17 PROCEDURE — 85652 RBC SED RATE AUTOMATED: CPT

## 2020-12-17 PROCEDURE — 36415 COLL VENOUS BLD VENIPUNCTURE: CPT

## 2020-12-17 PROCEDURE — 86235 NUCLEAR ANTIGEN ANTIBODY: CPT

## 2020-12-17 PROCEDURE — 86431 RHEUMATOID FACTOR QUANT: CPT

## 2020-12-17 PROCEDURE — 86039 ANTINUCLEAR ANTIBODIES (ANA): CPT

## 2020-12-17 PROCEDURE — 80053 COMPREHEN METABOLIC PANEL: CPT

## 2020-12-17 NOTE — PROGRESS NOTES
CC: Jana Overton is a 65 y.o. female is suffering from   Chief Complaint   Patient presents with   • Establish Care         SUBJECTIVE:  1. 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 Anxiety disorder due to multiple medical problems  Ongoing anxiety because of a very long history of arthritis        Past social, family, history:   Social History     Tobacco Use   • Smoking status: Never Smoker   • Smokeless tobacco: Never Used   • Tobacco comment: second hand smoke   Substance Use Topics   • Alcohol use: Yes   • Drug use: Yes     Comment: medical marijuana         MEDICATIONS:    Current Outpatient Medications:   •  oxyCODONE immediate release (ROXICODONE) 10 MG immediate release tablet, TAKE 1 TABLET BY MOUTH FOUR TIMES DAILY AS NEEDED FOR 30 DAYS, Disp: , Rfl:   •  granisetron (KYTRIL) 1 MG Tab, Take 1 mg by mouth every 12 hours as needed for Nausea/Vomiting., Disp: , Rfl:   •  naratriptan (AMERGE) 2.5 MG tablet, TK 1 T PO 1 TIME. MAY REPEAT AFTER 4 H, Disp: , Rfl:   •  ondansetron (ZOFRAN ODT) 4 MG TABLET DISPERSIBLE, DIS 1 TO 2 TS UNT D PRF NAUSEA, Disp: , Rfl:   •  ipratropium-albuterol (DUONEB) 0.5-2.5 (3) MG/3ML nebulizer solution, Take 3 mL by nebulization 4 times a day., Disp: , Rfl:   •  estradiol (ESTRACE VAGINAL) 0.1 MG/GM vaginal cream, Insert  into the vagina every day., Disp: , Rfl:   •  fluticasone (FLONASE) 50 MCG/ACT nasal spray, Administer 1 Spray into affected nostril(S) every day., Disp: , Rfl:   •  ipratropium (ATROVENT) 0.03 % Solution, Administer 2 Sprays into affected nostril(S) every 12 hours., Disp: , Rfl:   •  VIRTUSSIN A/C Solution oral  solution, TK 5 ML PO Q 5 H PRN COU FOR 5 DAYS, Disp: , Rfl:   •  metoprolol (LOPRESSOR) 50 MG Tab, TAKE 1 TABLET BY MOUTH TWICE DAILY. HOLD IF BLOOD PRESSURE LESS THAN 95/50, Disp: 180 Tab, Rfl: 3  •  cetirizine (ZYRTEC) 10 MG Tab, Take 10 mg by mouth every day., Disp: , Rfl:   •  ONDANSETRON HCL PO, Take 2 mg by mouth as needed., Disp: , Rfl:   •  torsemide (DEMADEX) 10 MG tablet, Take 1 Tab by mouth 1 time daily as needed., Disp: , Rfl:   •  spironolactone (ALDACTONE) 25 MG Tab, Take 2 Tabs by mouth every day., Disp: , Rfl:   •  POTASSIUM PO, Take 10 mEq by mouth as needed., Disp: , Rfl:   •  hydrALAZINE (APRESOLINE) 10 MG Tab, Take 10 mg by mouth 3 times a day as needed (High BP). For SBP >155, Disp: , Rfl:   •  prochlorperazine (COMPAZINE) 10 MG Tab, Take 10 mg by mouth every 6 hours as needed for Nausea/Vomiting., Disp: , Rfl:   •  diazePAM (VALIUM) 5 MG Tab, Take 5 mg by mouth every 6 hours as needed for Anxiety., Disp: , Rfl:   •  albuterol (VENTOLIN OR PROVENTIL) 108 (90 BASE) MCG/ACT Aero Soln, Inhale 2 Puffs by mouth every 6 hours as needed., Disp: , Rfl:   •  Probiotic Product (PROBIOTIC PO), Take  by mouth., Disp: , Rfl:   •  aspirin (ASA) 81 MG Chew Tab chewable tablet, Take 81 mg by mouth every day., Disp: , Rfl:   •  oxycodone 20 MG/ML CONC, Take 50-80 mg by mouth every four hours as needed (Pain). 20mg/ml solution Pt states only one bottle, Disp: , Rfl:     PROBLEMS:  Patient Active Problem List    Diagnosis Date Noted   • 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HCC) 12/28/2018     Priority: High   • DVT (deep venous thrombosis) (HCC) 12/31/2018     Priority: Medium   • Anemia 12/15/2018     Priority: Medium   • Anxiety disorder due to multiple medical problems 12/01/2017     Priority: Medium   • Crohn's disease of small intestine with complication (HCC) 09/23/2009     Priority: Medium   • Paroxysmal atrial fibrillation (HCC) 03/26/2015     Priority: Low   • Chronic pain 09/23/2009     Priority: Low   • Migraine  06/04/2019   • Hypertension 05/20/2019   • History of MRSA infection 12/29/2018   • History of infection with vancomycin resistant Enterococcus (VRE) 12/29/2018   • Dysphasia 12/28/2018   • Thrush of mouth and esophagus ([facility]) 12/13/2018   • Liver enzyme elevation 12/12/2018   • Leukocytosis 12/12/2018   • Chronic respiratory failure with hypoxia ([facility]) 03/20/2018   • DDD (degenerative disc disease), lumbar 04/12/2017   • History of DVT (deep vein thrombosis) 11/23/2016   • Sleep apnea 10/26/2014   • Postherpetic neuralgia 06/24/2014   • Multiple falls 06/24/2014   • COPD (chronic obstructive pulmonary disease) ([facility]) 06/24/2014   • Rosacea 06/24/2014   • Ileostomy in place ([facility]) 06/26/2013   • GERD (gastroesophageal reflux disease) 12/05/2012   • Status post lumbar surgery 07/16/2012   • Opioid type dependence, continuous (CMS-HCC) 09/23/2009       REVIEW OF SYSTEMS:  Gen.:  No Nausea, Vomiting, fever, Chills.  Heart: No chest pain.  Lungs:  No shortness of Breath.  Psychological: Rg unusual Anxiety depression     PHYSICAL EXAM   Constitutional: Alert, cooperative, not in acute distress.  Cardiovascular:  Rate Rhythm is regular without murmurs rubs clicks.     Thorax & Lungs: Clear to auscultation, no wheezing, rhonchi, or rales  HENT: Normocephalic, Atraumatic.  Eyes: PERRLA, EOMI, Conjunctiva normal.   Neck: Trachia is midline no swelling of the thyroid.   Lymphatic: No lymphadenopathy noted.   Musculoskeletal: Sclerodactyly  Neurologic: Alert & oriented x 3, cranial nerves II through XII are intact, Normal motor function, Normal sensory function, No focal deficits noted.   Psychiatric: Affect normal, Judgment normal, Mood normal.     VITAL SIGNS:/80   Pulse 94   Temp 36.6 °C (97.9 °F) (Temporal)   Resp 14   Ht 1.829 m (6')   Wt 74.6 kg (164 lb 6.4 oz)   SpO2 93%   BMI 22.30 kg/m²     Labs: Reviewed    Assessment:                                                     Plan:    1. Crohn's disease of  small intestine with complication (HCC)  Labs ordered  - Comp Metabolic Panel; Future  - CBC WITH DIFFERENTIAL; Future  - Sed Rate; Future  - RHEUMATOID ARTHRITIS FACTOR; Future  - VAHID REFLEXIVE PROFILE; Future  - ANTI SCL-70 ABS; Future  - Anti-RNA Polymerase III (RDL)  - REFERRAL TO RHEUMATOLOGY    2. Sclerodactyly  Suspect scleroderma possible lupus  - Comp Metabolic Panel; Future  - CBC WITH DIFFERENTIAL; Future  - Sed Rate; Future  - RHEUMATOID ARTHRITIS FACTOR; Future  - VAHID REFLEXIVE PROFILE; Future  - ANTI SCL-70 ABS; Future  - Anti-RNA Polymerase III (RDL)  - REFERRAL TO RHEUMATOLOGY    3. Anxiety disorder due to multiple medical problems  Check thyroid function as necessary otherwise stable  - Comp Metabolic Panel; Future  - CBC WITH DIFFERENTIAL; Future  - Sed Rate; Future  - RHEUMATOID ARTHRITIS FACTOR; Future  - VAHID REFLEXIVE PROFILE; Future  - ANTI SCL-70 ABS; Future  - Anti-RNA Polymerase III (RDL)  - REFERRAL TO RHEUMATOLOGY

## 2020-12-18 NOTE — TELEPHONE ENCOUNTER
Pt called to see if you have received her test results. Please call Pt back at [phone]. Pt will be available until 10:45am and then not again until 3pm.

## 2020-12-19 LAB
ANA INTERPRETIVE COMMENT Q5143: ABNORMAL
ANA PATTERN Q5144: ABNORMAL
ANA TITER Q5145: ABNORMAL
ANTINUCLEAR ANTIBODY (ANA), HEP-2, IGG Q5142: DETECTED
ENA SCL70 IGG SER QL: 4 AU/ML (ref 0–40)
NUCLEAR IGG SER QL IA: DETECTED

## 2020-12-20 LAB
DSDNA AB TITR SER CLIF: DETECTED {TITER}
ENA JO1 AB TITR SER: 2 AU/ML (ref 0–40)
ENA SM IGG SER-ACNC: 3 AU/ML (ref 0–40)
ENA SS-B IGG SER IA-ACNC: 1 AU/ML (ref 0–40)
SSA52 R0ENA AB IGG Q0420: 2 AU/ML (ref 0–40)
SSA60 R0ENA AB IGG Q0419: 1 AU/ML (ref 0–40)
TEST NAME 95000: NORMAL
U1 SNRNP IGG SER QL: 8 AU/ML (ref 0–40)

## 2020-12-21 ENCOUNTER — APPOINTMENT (RX ONLY)
Dept: URBAN - METROPOLITAN AREA CLINIC 20 | Facility: CLINIC | Age: 65
Setting detail: DERMATOLOGY
End: 2020-12-21

## 2020-12-21 DIAGNOSIS — Z41.9 ENCOUNTER FOR PROCEDURE FOR PURPOSES OTHER THAN REMEDYING HEALTH STATE, UNSPECIFIED: ICD-10-CM

## 2020-12-21 LAB — DSDNA IGG TITR SER CLIF: NORMAL {TITER}

## 2020-12-21 PROCEDURE — ? PULSED-DYE LASER

## 2020-12-21 ASSESSMENT — LOCATION DETAILED DESCRIPTION DERM
LOCATION DETAILED: LEFT LOWER CUTANEOUS LIP
LOCATION DETAILED: RIGHT CENTRAL MALAR CHEEK
LOCATION DETAILED: NASAL DORSUM
LOCATION DETAILED: LEFT CENTRAL MALAR CHEEK
LOCATION DETAILED: LEFT CENTRAL OCCIPITAL SCALP

## 2020-12-21 ASSESSMENT — LOCATION ZONE DERM
LOCATION ZONE: NOSE
LOCATION ZONE: SCALP
LOCATION ZONE: FACE
LOCATION ZONE: LIP

## 2020-12-21 ASSESSMENT — LOCATION SIMPLE DESCRIPTION DERM
LOCATION SIMPLE: LEFT CHEEK
LOCATION SIMPLE: SCALP
LOCATION SIMPLE: LEFT LIP
LOCATION SIMPLE: RIGHT CHEEK
LOCATION SIMPLE: NOSE

## 2020-12-21 NOTE — PROCEDURE: PULSED-DYE LASER
Spot Size: 10 mm
Pulse Duration: 10 ms
Laser Type: Vbeam 595nm
Spot Size: 7 mm
Delay Time (Ms): 20
Cryogen Time (Ms): 30
Delay Time (Ms): 10
Post-Procedure Care: Vaseline and ice applied. Post care reviewed with patient.
Consent: Written consent obtained, risks reviewed including but not limited to crusting, scabbing, blistering, scarring, darker or lighter pigmentary change, incidental hair removal, bruising, and/or incomplete removal.
Location (Required For Details To Render In Note But Body Touch Will Also Count For First Location): cheeks
Fluence In J/Cm2 (Optional): 6.75
Post-Care Instructions: I reviewed with the patient in detail post-care instructions. Patient should stay away from the sun and wear sun protection until treated areas are fully healed.
Fluence In J/Cm2 (Optional): 6.25
Detail Level: Simple
Location Override: nose and chin
Price (Use Numbers Only, No Special Characters Or $): 300

## 2020-12-22 ENCOUNTER — TELEMEDICINE (OUTPATIENT)
Dept: CARDIOLOGY | Facility: MEDICAL CENTER | Age: 65
End: 2020-12-22
Payer: MEDICARE

## 2020-12-22 VITALS
SYSTOLIC BLOOD PRESSURE: 150 MMHG | DIASTOLIC BLOOD PRESSURE: 100 MMHG | HEIGHT: 72 IN | HEART RATE: 104 BPM | BODY MASS INDEX: 22.08 KG/M2 | WEIGHT: 163 LBS

## 2020-12-22 DIAGNOSIS — I48.0 PAROXYSMAL ATRIAL FIBRILLATION (HCC): ICD-10-CM

## 2020-12-22 DIAGNOSIS — J44.9 CHRONIC OBSTRUCTIVE PULMONARY DISEASE, UNSPECIFIED COPD TYPE (HCC): ICD-10-CM

## 2020-12-22 DIAGNOSIS — I10 ESSENTIAL HYPERTENSION: ICD-10-CM

## 2020-12-22 DIAGNOSIS — G47.30 SLEEP APNEA, UNSPECIFIED TYPE: ICD-10-CM

## 2020-12-22 DIAGNOSIS — K50.019 CROHN'S DISEASE OF SMALL INTESTINE WITH COMPLICATION (HCC): ICD-10-CM

## 2020-12-22 DIAGNOSIS — R00.2 PALPITATIONS: ICD-10-CM

## 2020-12-22 DIAGNOSIS — I47.10 PSVT (PAROXYSMAL SUPRAVENTRICULAR TACHYCARDIA) (HCC): ICD-10-CM

## 2020-12-22 PROCEDURE — 99214 OFFICE O/P EST MOD 30 MIN: CPT | Mod: 95,CR | Performed by: NURSE PRACTITIONER

## 2020-12-22 RX ORDER — SPIRONOLACTONE 25 MG/1
50 TABLET ORAL DAILY
Qty: 180 TAB | Refills: 3 | Status: SHIPPED | OUTPATIENT
Start: 2020-12-22 | End: 2021-04-12 | Stop reason: SDUPTHER

## 2020-12-22 ASSESSMENT — ENCOUNTER SYMPTOMS
TINGLING: 1
ORTHOPNEA: 1
DIZZINESS: 0
FEVER: 0
PND: 1
PALPITATIONS: 1
ABDOMINAL PAIN: 0
COUGH: 0
MYALGIAS: 0
CLAUDICATION: 0
DIAPHORESIS: 1
SHORTNESS OF BREATH: 1

## 2020-12-22 ASSESSMENT — FIBROSIS 4 INDEX: FIB4 SCORE: 1.72

## 2020-12-22 NOTE — PROGRESS NOTES
Chief Complaint   Patient presents with   • Atrial Fibrillation     virtual visit zio results       Subjective:   This evaluation was conducted via Zoom using secure and encrypted videoconferencing technology. The patient was in a private location in the state [employer].    The patient's identity was confirmed and verbal consent was obtained for this virtual visit.    Jana Overton is a 65 y.o. female who presents today for follow-up after her event monitor.    Patient of Dr. Monahan.  She was last seen in clinic on 11/5/2020 with Latoya Oh PA-C.  During that visit, she was sent for an event monitor during her due to problems with fast heart rates.    Patient reports since that visit, she had her event monitor, and states 2 days into wearing the event monitor, she was attacked by a dog and was resting for about a week.  She is concerned her results are not accurate because she was not as active as she normally is.    Patient reports she is a hiker, does yoga and exercises regularly.  She reports her symptoms typically occur with exertion.  She states she has fast heart rates, and has taken an extra dose of metoprolol which then causes her to have low blood pressure and heart rate.    She states before her visit today, she had an A. fib episode that is why her heart rate and her blood pressures are elevated.    She reports her blood pressures typically range in the 90s and low 100s.    She reports symptoms of sweating/diaphoresis, hand tingling and feels like her feet are going to collapse underneath her when she goes into her fast heart rate episodes/A. Fib.  Her other symptoms include chest pain/pressure with fast heart rates, palpitations, orthopnea, edema/lymphedema, PND and shortness of breath.    Patient is planning on having a stoma revision next month due to her stoma leaking.    She reports unable to take an extra torsemide too often due to her stoma and Crohn's disease.  She is currently having a  Crohn's disease flareup.    She does try to stay well-hydrated and taking in electrolytes often.    Additonally, patient has the following medical problems:    -Hypertension    -Sleep apnea: But not actively using her CPAP, she plans on following up with pulmonary again    -Crohn's disease: followed by GI    -Tamra A. Fib    -COPD: previously followed by Pulmonary    -History of DVT    Past Medical History:   Diagnosis Date   • Anesthesia     difficult Iv stick   • Anxiety    • Arthritis     all over   • ASTHMA    • Atrial fib/flut    • Backpain    • Breath shortness    • Bronchitis     5 years   • Chronic pain    • Colostomy in place ([facility]) 10/14/2010   • COPD (chronic obstructive pulmonary disease) ([facility]) 6/24/2014   • COPD (chronic obstructive pulmonary disease) ([facility]) 6/24/2014   • Crohn's disease of colon ([facility])    • Dyspnea    • History of cardiac murmur    • Hypokalemia 6/24/2014   • Indigestion    • Infectious disease     MRSA, VancoRSA   • Multiple falls 6/24/2014   • Narcotic dependence ([facility]) 9/23/2009   • Obstruction    • Other specified disorder of intestines     crohns disease   • Pain 7/11/13    all over   • Pneumonia     child and mid 30's   • Postherpetic neuralgia 6/24/2014   • Rosacea 6/24/2014   • Sleep apnea      Past Surgical History:   Procedure Laterality Date   • GASTROSCOPY N/A 5/22/2019    Procedure: GASTROSCOPY - WITH DILATION;  Surgeon: Dionicio Cardona M.D.;  Location: [facility];  Service: Gastroenterology   • GASTROSCOPY  1/6/2019    Procedure: GASTROSCOPY- WITH DILATION;  Surgeon: Daniel Daniels M.D.;  Location: SURGERY [facility];  Service: Gastroenterology   • ILEOSTOMY  12/29/2018    Procedure: ILEOSTOMY- REVISION;  Surgeon: Kevin Cruz M.D.;  Location: SURGERY [facility];  Service: General   • SIGMOIDOSCOPY FLEX  12/29/2018    Procedure: SIGMOIDOSCOPY FLEX;  Surgeon: Kevin Cruz M.D.;  Location: [facility];  Service: General   •  EXPLORATORY LAPAROTOMY  12/29/2018    Procedure: EXPLORATORY LAPAROTOMY, lysis of adhesions;  Surgeon: Kevin Cruz M.D.;  Location: SURGERY [facility];  Service: General   • ILEOSTOMY  5/14/2014    Performed by Kevin Cruz M.D. at SURGERY [facility]   • MAMMOPLASTY REDUCTION  7/17/2013    Performed by Janey Burt M.D. at SURGERY [facility]   • HARDWARE REMOVAL NEURO  7/16/2012    Performed by KEELEY KIM at SURGERY [facility] ORS   • GASTROSCOPY  10/4/2011    Performed by TYSON MARTINEZ at SURGERY SAME DAY [facility] ORS   • COLONOSCOPY  10/4/2011    Performed by TYSON MARTINEZ at SURGERY SAME DAY [facility] ORS   • DILATION AND CURETTAGE  9/24/2010    Performed by GAURAV ALY at SURGERY SAME DAY [facility] ORS   • ILEOSTOMY  11/11/2009    Performed by KEVIN CRUZ at SURGERY [facility]   • GASTROSCOPY WITH BIOPSY  11/22/08    Performed by NEGRITA HUYNH at SURGERY [facility]   • ABDOMINAL EXPLORATION     • CERVICAL DISK AND FUSION ANTERIOR     • COLON RESECTION     • FOOT SURGERY     • HAND SURGERY     • LUMBAR FUSION ANTERIOR     • LUMPECTOMY     • OTHER ABDOMINAL SURGERY      surgery for chrons disease   • PB REDUCTION OF LARGE BREAST       Family History   Problem Relation Age of Onset   • Lung Disease Mother         copd   • Diabetes Father    • Heart Disease Father    • Diabetes Sister    • Cancer Maternal Aunt         breast     Social History     Socioeconomic History   • Marital status:      Spouse name: Not on file   • Number of children: Not on file   • Years of education: Not on file   • Highest education level: Not on file   Occupational History   • Not on file   Social Needs   • Financial resource strain: Not on file   • Food insecurity     Worry: Not on file     Inability: Not on file   • Transportation needs     Medical: Not on file     Non-medical: Not on file   Tobacco Use   • Smoking status: Never Smoker   • Smokeless tobacco: Never  Used   • Tobacco comment: second hand smoke   Substance and Sexual Activity   • Alcohol use: Yes   • Drug use: Yes     Comment: medical marijuana   • Sexual activity: Not on file     Comment:    Lifestyle   • Physical activity     Days per week: Not on file     Minutes per session: Not on file   • Stress: Not on file   Relationships   • Social connections     Talks on phone: Not on file     Gets together: Not on file     Attends Religion service: Not on file     Active member of club or organization: Not on file     Attends meetings of clubs or organizations: Not on file     Relationship status: Not on file   • Intimate partner violence     Fear of current or ex partner: Not on file     Emotionally abused: Not on file     Physically abused: Not on file     Forced sexual activity: Not on file   Other Topics Concern   • Not on file   Social History Narrative   • Not on file     Allergies   Allergen Reactions   • Azathioprine Sodium Hives   • Carafate [Sucralfate] Rash     Generalizes/Mouth Sores   • Infliximab Hives   • Morphine Swelling   • Roxanol [Morphine Sulfate] Swelling     Throat swells   • Amitriptyline Unspecified     Nightmares     • Demerol Vomiting   • Fentanyl Unspecified     Patches caused skin breakdown, no pain relief   • Lorazepam Unspecified     States give me nightmares.    • Methadone    • Methotrexate Hives and Rash   • Pregabalin Rash     Rash     • Pseudoephedrine Vomiting   • Sulfa Drugs Hives   • Tape Rash     Skin peels off; paper tape   • Lyrica    • Promethazine    • Celestone [Betamethasone Sodium Phosphate] Unspecified     Pt unable to remember   • Sulfasalazine Unspecified     Pt unable to remember   • Tizanidine Hydrochloride Unspecified     Pt unable to remember     Outpatient Encounter Medications as of 12/22/2020   Medication Sig Dispense Refill   • oxyCODONE immediate release (ROXICODONE) 10 MG immediate release tablet TAKE 1 TABLET BY MOUTH FOUR TIMES DAILY AS NEEDED FOR  30 DAYS     • granisetron (KYTRIL) 1 MG Tab Take 1 mg by mouth every 12 hours as needed for Nausea/Vomiting.     • ondansetron (ZOFRAN ODT) 4 MG TABLET DISPERSIBLE DIS 1 TO 2 TS UNT D PRF NAUSEA     • ipratropium-albuterol (DUONEB) 0.5-2.5 (3) MG/3ML nebulizer solution Take 3 mL by nebulization 4 times a day.     • estradiol (ESTRACE VAGINAL) 0.1 MG/GM vaginal cream Insert  into the vagina every day.     • VIRTUSSIN A/C Solution oral solution TK 5 ML PO Q 5 H PRN COU FOR 5 DAYS     • metoprolol (LOPRESSOR) 50 MG Tab TAKE 1 TABLET BY MOUTH TWICE DAILY. HOLD IF BLOOD PRESSURE LESS THAN 95/50 180 Tab 3   • cetirizine (ZYRTEC) 10 MG Tab Take 10 mg by mouth every day.     • ONDANSETRON HCL PO Take 2 mg by mouth as needed.     • torsemide (DEMADEX) 10 MG tablet Take 10 mg by mouth 1 time a day as needed. 10-20 mg prn     • spironolactone (ALDACTONE) 25 MG Tab Take 2 Tabs by mouth every day.     • POTASSIUM PO Take 10 mEq by mouth as needed.     • Probiotic Product (PROBIOTIC PO) Take  by mouth.     • hydrALAZINE (APRESOLINE) 10 MG Tab Take 10 mg by mouth 3 times a day as needed (High BP). For SBP >155     • prochlorperazine (COMPAZINE) 10 MG Tab Take 10 mg by mouth every 6 hours as needed for Nausea/Vomiting.     • diazePAM (VALIUM) 5 MG Tab Take 5 mg by mouth every 6 hours as needed (for muscle spasms).     • aspirin (ASA) 81 MG Chew Tab chewable tablet Take 81 mg by mouth every day.     • albuterol (VENTOLIN OR PROVENTIL) 108 (90 BASE) MCG/ACT Aero Soln Inhale 2 Puffs by mouth every 6 hours as needed.     • naratriptan (AMERGE) 2.5 MG tablet TK 1 T PO 1 TIME. MAY REPEAT AFTER 4 H     • fluticasone (FLONASE) 50 MCG/ACT nasal spray Administer 1 Spray into affected nostril(S) every day.     • ipratropium (ATROVENT) 0.03 % Solution Administer 2 Sprays into affected nostril(S) every 12 hours.     • oxycodone 20 MG/ML CONC Take 50-80 mg by mouth every four hours as needed (Pain). 20mg/ml solution  Pt states only one bottle        No facility-administered encounter medications on file as of 12/22/2020.      Review of Systems   Constitutional: Positive for diaphoresis. Negative for fever and malaise/fatigue.   Respiratory: Positive for shortness of breath. Negative for cough.    Cardiovascular: Positive for chest pain, palpitations, orthopnea (sleeps in recliner), leg swelling and PND. Negative for claudication.   Gastrointestinal: Negative for abdominal pain.   Musculoskeletal: Negative for myalgias.   Neurological: Positive for tingling. Negative for dizziness.   All other systems reviewed and are negative.       Objective:   /100 (BP Location: Left arm, Patient Position: Sitting)   Pulse (!) 104   Ht 1.829 m (6')   Wt 73.9 kg (163 lb)   BMI 22.11 kg/m²     Physical Exam   Constitutional: She is oriented to person, place, and time. She appears well-developed and well-nourished.   HENT:   Head: Normocephalic and atraumatic.   Eyes: Pupils are equal, round, and reactive to light. EOM are normal.   Neck: Normal range of motion. Neck supple. No JVD present.   Cardiovascular: Normal rate, regular rhythm and normal heart sounds.   Pulmonary/Chest: Effort normal and breath sounds normal. No respiratory distress. She has no wheezes. She has no rales.   Abdominal: Soft. Bowel sounds are normal.   Musculoskeletal:         General: No edema.   Neurological: She is alert and oriented to person, place, and time.   Skin: Skin is warm and dry.   Psychiatric: She has a normal mood and affect. Her behavior is normal.   Vitals reviewed.    Lab Results   Component Value Date/Time    CHOLSTRLTOT 198 05/07/2020 01:34 PM    LDL 92 05/07/2020 01:34 PM    HDL 80 05/07/2020 01:34 PM    TRIGLYCERIDE 131 05/07/2020 01:34 PM       Lab Results   Component Value Date/Time    SODIUM 137 12/17/2020 11:24 AM    POTASSIUM 4.4 12/17/2020 11:24 AM    CHLORIDE 102 12/17/2020 11:24 AM    CO2 23 12/17/2020 11:24 AM    GLUCOSE 97 12/17/2020 11:24 AM    BUN 26 (H)  12/17/2020 11:24 AM    CREATININE 1.07 12/17/2020 11:24 AM    CREATININE 0.89 02/10/2010 04:04 PM    BUNCREATRAT 17 02/10/2010 04:04 PM    GLOMRATE >59 02/10/2010 04:04 PM     Lab Results   Component Value Date/Time    ALKPHOSPHAT 118 (H) 12/17/2020 11:24 AM    ASTSGOT 25 12/17/2020 11:24 AM    ALTSGPT 18 12/17/2020 11:24 AM    TBILIRUBIN 0.5 12/17/2020 11:24 AM      TTE (12/27/2017):  CONCLUSIONS  1. Normal right and left ventricular size and function. Normal regional wall motion  2. Abnormal septal motion consistent with RV volume overload, however estimated right atrial pressure by IVC assessment is normal  3. No significant valve disease or flow abnormalities.   4. Unable to estimate pulmonary artery pressure due to an inadequate tricuspid regurgitant jet.    Compared to the images of the study done 4/3/13 - there has been no significant change.      Zio patch 1/3/18  - 19sec of possible atrial fibrillation  - symptomatic SVT     Cardiac PET 2/5/2018  ELECTROCARDIOGRAPHIC FINDINGS:  EKG review shows a normal sinus rhythm with no   ischemic ST segment changes at rest or stress.     SCINTOGRAPHIC FINDINGS:  There is normal homogenous tracer uptake at rest and   stress.     GATED WALL MOTION FINDINGS:  Show an ejection fraction of 72% at rest, which increases to 78% at peak stress.     CONCLUSIONS AND IMPRESSION:  Normal cardiac stress test.  Assessment:     1. Palpitations  REFERRAL TO CARDIOLOGY   2. Paroxysmal atrial fibrillation (HCC)  REFERRAL TO CARDIOLOGY   3. PSVT (paroxysmal supraventricular tachycardia) ([facility])  REFERRAL TO CARDIOLOGY   4. Crohn's disease of small intestine with complication (HCC)     5. Chronic obstructive pulmonary disease, unspecified COPD type (HCC)     6. Sleep apnea, unspecified type     7. Essential hypertension         Medical Decision Making:  Today's Assessment / Status / Plan:   1.  Palpitations, PSVT, A. Fib:  -Reviewed preliminary results of her Zio patch.   -She is having rare  episodes of PSVT, atrial tachycardia, PVCs.  -Patient continues to take metoprolol 50 mg twice a day and does have a as needed dose of metoprolol which she states she she cannot take too often because it causes low blood pressure and low heart rate.  -[id number] score is 3  -Reintroduced taking OAC, patient reports only wanting to take aspirin at this time, she is concerned about her Crohn's disease.  -Discussed referral over to EP for additional opinion. Pt would like to proceed    2.  Hypertension:  -Patient's blood pressure readings appear to be labile  -Continue spironolactone 50 mg daily for now  -Continue metoprolol 50 mg twice a day  -Patient does have hydralazine as needed    3.  Leg swelling/lymphedema:  -Continue spironolactone  -Patient does have as needed torsemide, but does not like to use it because of her Crohn's disease.    4.  Sleep apnea:  -Patient currently not using CPAP, states she will try to get back in with pulmonary to discuss.  She states she feels she may not need it anymore.    FU in clinic in 3 months with Dr. Monahan and referred to EP for additional opinion. Sooner if needed.    Patient verbalizes understanding and agrees with the plan of care.     PLEASE NOTE: This Note was created using voice recognition Software. I have made every reasonable attempt to correct obvious errors, but I expect that there are errors of grammar and possibly content that I did not discover before finalizing the note

## 2020-12-30 ENCOUNTER — HOSPITAL ENCOUNTER (OUTPATIENT)
Dept: RADIOLOGY | Facility: MEDICAL CENTER | Age: 65
End: 2020-12-30
Attending: INTERNAL MEDICINE
Payer: MEDICARE

## 2020-12-30 DIAGNOSIS — Z12.31 VISIT FOR SCREENING MAMMOGRAM: ICD-10-CM

## 2020-12-30 PROCEDURE — 77067 SCR MAMMO BI INCL CAD: CPT

## 2021-01-11 ENCOUNTER — TELEPHONE (OUTPATIENT)
Dept: MEDICAL GROUP | Facility: LAB | Age: 66
End: 2021-01-11

## 2021-01-11 NOTE — TELEPHONE ENCOUNTER
"· Physical Therapy paperwork received from [facility] requiring provider signature.     · All appropriate fields completed by Medical Assistant: No    · Paperwork placed in \"MA to Provider\" folder/basket. Awaiting provider completion/signature.  "

## 2021-01-14 ENCOUNTER — OFFICE VISIT (OUTPATIENT)
Dept: MEDICAL GROUP | Facility: LAB | Age: 66
End: 2021-01-14
Payer: MEDICARE

## 2021-01-14 VITALS
HEART RATE: 78 BPM | BODY MASS INDEX: 22 KG/M2 | DIASTOLIC BLOOD PRESSURE: 64 MMHG | HEIGHT: 72 IN | WEIGHT: 162.4 LBS | SYSTOLIC BLOOD PRESSURE: 102 MMHG | OXYGEN SATURATION: 97 % | TEMPERATURE: 96.5 F | RESPIRATION RATE: 12 BRPM

## 2021-01-14 DIAGNOSIS — K50.019 CROHN'S DISEASE OF SMALL INTESTINE WITH COMPLICATION (HCC): ICD-10-CM

## 2021-01-14 DIAGNOSIS — R89.9 ABNORMAL LABORATORY TEST: ICD-10-CM

## 2021-01-14 PROCEDURE — 99213 OFFICE O/P EST LOW 20 MIN: CPT | Performed by: INTERNAL MEDICINE

## 2021-01-14 ASSESSMENT — PATIENT HEALTH QUESTIONNAIRE - PHQ9: CLINICAL INTERPRETATION OF PHQ2 SCORE: 0

## 2021-01-14 ASSESSMENT — FIBROSIS 4 INDEX: FIB4 SCORE: 1.72

## 2021-01-15 ENCOUNTER — PATIENT MESSAGE (OUTPATIENT)
Dept: CARDIOLOGY | Facility: MEDICAL CENTER | Age: 66
End: 2021-01-15

## 2021-01-15 ENCOUNTER — PRE-ADMISSION TESTING (OUTPATIENT)
Dept: ADMISSIONS | Facility: MEDICAL CENTER | Age: 66
DRG: 336 | End: 2021-01-15
Attending: COLON & RECTAL SURGERY
Payer: MEDICARE

## 2021-01-15 DIAGNOSIS — Z01.810 PRE-OPERATIVE CARDIOVASCULAR EXAMINATION: ICD-10-CM

## 2021-01-15 DIAGNOSIS — Z01.812 PRE-OPERATIVE LABORATORY EXAMINATION: ICD-10-CM

## 2021-01-15 LAB
ANION GAP SERPL CALC-SCNC: 11 MMOL/L (ref 7–16)
BUN SERPL-MCNC: 33 MG/DL (ref 8–22)
CALCIUM SERPL-MCNC: 10.2 MG/DL (ref 8.5–10.5)
CHLORIDE SERPL-SCNC: 99 MMOL/L (ref 96–112)
CO2 SERPL-SCNC: 25 MMOL/L (ref 20–33)
CREAT SERPL-MCNC: 1.13 MG/DL (ref 0.5–1.4)
EKG IMPRESSION: NORMAL
ERYTHROCYTE [DISTWIDTH] IN BLOOD BY AUTOMATED COUNT: 42.9 FL (ref 35.9–50)
GLUCOSE SERPL-MCNC: 110 MG/DL (ref 65–99)
HCT VFR BLD AUTO: 45.2 % (ref 37–47)
HGB BLD-MCNC: 14.7 G/DL (ref 12–16)
MCH RBC QN AUTO: 30.9 PG (ref 27–33)
MCHC RBC AUTO-ENTMCNC: 32.5 G/DL (ref 33.6–35)
MCV RBC AUTO: 95.2 FL (ref 81.4–97.8)
PLATELET # BLD AUTO: 242 K/UL (ref 164–446)
PMV BLD AUTO: 11 FL (ref 9–12.9)
POTASSIUM SERPL-SCNC: 3.7 MMOL/L (ref 3.6–5.5)
RBC # BLD AUTO: 4.75 M/UL (ref 4.2–5.4)
SARS-COV-2 RNA RESP QL NAA+PROBE: NOTDETECTED
SODIUM SERPL-SCNC: 135 MMOL/L (ref 135–145)
SPECIMEN SOURCE: NORMAL
WBC # BLD AUTO: 9.8 K/UL (ref 4.8–10.8)

## 2021-01-15 PROCEDURE — U0003 INFECTIOUS AGENT DETECTION BY NUCLEIC ACID (DNA OR RNA); SEVERE ACUTE RESPIRATORY SYNDROME CORONAVIRUS 2 (SARS-COV-2) (CORONAVIRUS DISEASE [COVID-19]), AMPLIFIED PROBE TECHNIQUE, MAKING USE OF HIGH THROUGHPUT TECHNOLOGIES AS DESCRIBED BY CMS-2020-01-R: HCPCS

## 2021-01-15 PROCEDURE — 80048 BASIC METABOLIC PNL TOTAL CA: CPT

## 2021-01-15 PROCEDURE — C9803 HOPD COVID-19 SPEC COLLECT: HCPCS

## 2021-01-15 PROCEDURE — 93005 ELECTROCARDIOGRAM TRACING: CPT

## 2021-01-15 PROCEDURE — 93010 ELECTROCARDIOGRAM REPORT: CPT | Performed by: INTERNAL MEDICINE

## 2021-01-15 PROCEDURE — 85027 COMPLETE CBC AUTOMATED: CPT

## 2021-01-15 PROCEDURE — U0005 INFEC AGEN DETEC AMPLI PROBE: HCPCS

## 2021-01-15 PROCEDURE — 36415 COLL VENOUS BLD VENIPUNCTURE: CPT

## 2021-01-15 RX ORDER — OXYCODONE HYDROCHLORIDE 10 MG/1
10 TABLET ORAL EVERY 4 HOURS PRN
COMMUNITY
End: 2022-09-07 | Stop reason: SDUPTHER

## 2021-01-15 NOTE — OR NURSING
Patient became very upset and agitated during preadmission interview. Insisted on leaving for another medical appointment before preadmission process and labs/testing could be completed. Informed patient that she needs labs/ekg/covid testing, states she will return.

## 2021-01-15 NOTE — PROGRESS NOTES
When is her procedure, typically aspirin is stopped 5-7 days prior. Yes, it is ok to stop. She is not on OAC for A fib.

## 2021-01-15 NOTE — PROGRESS NOTES
CC: Jana Overton is a 65 y.o. female is suffering from   Chief Complaint   Patient presents with   • Follow-Up     1 month follow up         SUBJECTIVE:  1. Abnormal laboratory test  Jana is here for follow-up, has a history of mildly abnormal lab tests.  Patient is suffering from Crohn's disease is pending surgery with Dr. Kevin mustafa    2. Crohn's disease of small intestine with complication (HCC)  Patient with a Crohn's disease ileostomy is to undergo additional surgery for January 20, 2021        Past social, family, history:   Social History     Tobacco Use   • Smoking status: Never Smoker   • Smokeless tobacco: Never Used   • Tobacco comment: second hand smoke   Substance Use Topics   • Alcohol use: Yes   • Drug use: Yes     Comment: medical marijuana         MEDICATIONS:    Current Outpatient Medications:   •  spironolactone (ALDACTONE) 25 MG Tab, Take 2 Tabs by mouth every day., Disp: 180 Tab, Rfl: 3  •  oxyCODONE immediate release (ROXICODONE) 10 MG immediate release tablet, TAKE 1 TABLET BY MOUTH FOUR TIMES DAILY AS NEEDED FOR 30 DAYS, Disp: , Rfl:   •  granisetron (KYTRIL) 1 MG Tab, Take 1 mg by mouth every 12 hours as needed for Nausea/Vomiting., Disp: , Rfl:   •  naratriptan (AMERGE) 2.5 MG tablet, TK 1 T PO 1 TIME. MAY REPEAT AFTER 4 H, Disp: , Rfl:   •  ondansetron (ZOFRAN ODT) 4 MG TABLET DISPERSIBLE, DIS 1 TO 2 TS UNT D PRF NAUSEA, Disp: , Rfl:   •  ipratropium-albuterol (DUONEB) 0.5-2.5 (3) MG/3ML nebulizer solution, Take 3 mL by nebulization 4 times a day., Disp: , Rfl:   •  estradiol (ESTRACE VAGINAL) 0.1 MG/GM vaginal cream, Insert  into the vagina every day., Disp: , Rfl:   •  fluticasone (FLONASE) 50 MCG/ACT nasal spray, Administer 1 Spray into affected nostril(S) every day., Disp: , Rfl:   •  ipratropium (ATROVENT) 0.03 % Solution, Administer 2 Sprays into affected nostril(S) every 12 hours., Disp: , Rfl:   •  VIRTUSSIN A/C Solution oral solution, TK 5 ML PO Q 5 H PRN COU FOR 5 DAYS,  Disp: , Rfl:   •  metoprolol (LOPRESSOR) 50 MG Tab, TAKE 1 TABLET BY MOUTH TWICE DAILY. HOLD IF BLOOD PRESSURE LESS THAN 95/50, Disp: 180 Tab, Rfl: 3  •  cetirizine (ZYRTEC) 10 MG Tab, Take 10 mg by mouth every day., Disp: , Rfl:   •  torsemide (DEMADEX) 10 MG tablet, Take 10 mg by mouth 1 time a day as needed. 10-20 mg prn, Disp: , Rfl:   •  POTASSIUM PO, Take 10 mEq by mouth as needed., Disp: , Rfl:   •  Probiotic Product (PROBIOTIC PO), Take  by mouth., Disp: , Rfl:   •  hydrALAZINE (APRESOLINE) 10 MG Tab, Take 10 mg by mouth 3 times a day as needed (High BP). For SBP >155, Disp: , Rfl:   •  prochlorperazine (COMPAZINE) 10 MG Tab, Take 10 mg by mouth every 6 hours as needed for Nausea/Vomiting., Disp: , Rfl:   •  diazePAM (VALIUM) 5 MG Tab, Take 5 mg by mouth every 6 hours as needed (for muscle spasms)., Disp: , Rfl:   •  aspirin (ASA) 81 MG Chew Tab chewable tablet, Take 81 mg by mouth every day., Disp: , Rfl:   •  albuterol (VENTOLIN OR PROVENTIL) 108 (90 BASE) MCG/ACT Aero Soln, Inhale 2 Puffs by mouth every 6 hours as needed., Disp: , Rfl:   •  ONDANSETRON HCL PO, Take 2 mg by mouth as needed., Disp: , Rfl:     PROBLEMS:  Patient Active Problem List    Diagnosis Date Noted   • 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HCC) 12/28/2018     Priority: High   • DVT (deep venous thrombosis) ([facility]) 12/31/2018     Priority: Medium   • Anemia 12/15/2018     Priority: Medium   • Anxiety disorder due to multiple medical problems 12/01/2017     Priority: Medium   • Crohn's disease of small intestine with complication (HCC) 09/23/2009     Priority: Medium   • Paroxysmal atrial fibrillation (HCC) 03/26/2015     Priority: Low   • Chronic pain 09/23/2009     Priority: Low   • Migraine 06/04/2019   • Hypertension 05/20/2019   • History of MRSA infection 12/29/2018   • History of infection with vancomycin resistant Enterococcus (VRE) 12/29/2018   • Dysphasia 12/28/2018   • Thrush of mouth and esophagus (HCC) 12/13/2018   • Liver enzyme elevation  12/12/2018   • Leukocytosis 12/12/2018   • Chronic respiratory failure with hypoxia ([facility]) 03/20/2018   • DDD (degenerative disc disease), lumbar 04/12/2017   • History of DVT (deep vein thrombosis) 11/23/2016   • Sleep apnea 10/26/2014   • Postherpetic neuralgia 06/24/2014   • Multiple falls 06/24/2014   • COPD (chronic obstructive pulmonary disease) ([facility]) 06/24/2014   • Rosacea 06/24/2014   • Ileostomy in place ([facility]) 06/26/2013   • GERD (gastroesophageal reflux disease) 12/05/2012   • Status post lumbar surgery 07/16/2012   • Opioid type dependence, continuous (CMS-HCC) 09/23/2009       REVIEW OF SYSTEMS:  Gen.:  No Nausea, Vomiting, fever, Chills.  Heart: No chest pain.  Lungs:  No shortness of Breath.  Psychological: Rg unusual Anxiety depression     PHYSICAL EXAM   Constitutional: Alert, cooperative, not in acute distress.  Cardiovascular:  Rate Rhythm is regular without murmurs rubs clicks.     Thorax & Lungs: Clear to auscultation, no wheezing, rhonchi, or rales  HENT: Normocephalic, Atraumatic.  Eyes: PERRLA, EOMI, Conjunctiva normal.   Neck: Trachia is midline no swelling of the thyroid.   Neurologic: Alert & oriented x 3, cranial nerves II through XII are intact, Normal motor function, Normal sensory function, No focal deficits noted.   Psychiatric: Affect normal, Judgment normal, Mood normal.     VITAL SIGNS:/64   Pulse 78   Temp 35.8 °C (96.5 °F) (Temporal)   Resp 12   Ht 1.829 m (6')   Wt 73.7 kg (162 lb 6.4 oz)   SpO2 97%   BMI 22.03 kg/m²     Labs: Reviewed    Assessment:                                                     Plan:    1. Abnormal laboratory test  Abnormal VAHID recheck levels in 3 months  - VAHID HEp-2 Substrate, IgG IFA; Future  - VAHID REFLEXIVE PROFILE; Future    2. Crohn's disease of small intestine with complication (HCC)  Crohn's disease continue with surgery as indicated by Dr. Sepulveda

## 2021-01-19 NOTE — OR NURSING
"COVID-19 Pre-surgery screenin. Do you have an undiagnosed respiratory illness or symptoms such as coughing or sneezing? No   a. Onset of Sx No  b. Acute vs. chronic respiratory illness No    2. Do you have an unexplained fever greater than 100.4 degrees Fahrenheit or 38 degrees Celsius?     No     3. Have you had direct exposure to a patient who tested positive for Covid-19?    No     4. Have you had any loss of your sense of taste or smell? Have you had N/V or sore throat? No    Patient has been informed of visitor policy and asked to wear a mask upon entering the hospital   Yes    Pt wanted it noted that she is a \"hard stick', she doesn't want the procedure to be held up because of it.    "

## 2021-01-20 ENCOUNTER — HOSPITAL ENCOUNTER (INPATIENT)
Facility: MEDICAL CENTER | Age: 66
LOS: 2 days | DRG: 336 | End: 2021-01-22
Attending: COLON & RECTAL SURGERY | Admitting: COLON & RECTAL SURGERY
Payer: MEDICARE

## 2021-01-20 ENCOUNTER — ANESTHESIA EVENT (OUTPATIENT)
Dept: SURGERY | Facility: MEDICAL CENTER | Age: 66
DRG: 336 | End: 2021-01-20
Payer: MEDICARE

## 2021-01-20 ENCOUNTER — ANESTHESIA (OUTPATIENT)
Dept: SURGERY | Facility: MEDICAL CENTER | Age: 66
DRG: 336 | End: 2021-01-20
Payer: MEDICARE

## 2021-01-20 LAB — GLUCOSE BLD-MCNC: 100 MG/DL (ref 65–99)

## 2021-01-20 PROCEDURE — 160009 HCHG ANES TIME/MIN: Performed by: COLON & RECTAL SURGERY

## 2021-01-20 PROCEDURE — 160048 HCHG OR STATISTICAL LEVEL 1-5: Performed by: COLON & RECTAL SURGERY

## 2021-01-20 PROCEDURE — 160036 HCHG PACU - EA ADDL 30 MINS PHASE I: Performed by: COLON & RECTAL SURGERY

## 2021-01-20 PROCEDURE — 501838 HCHG SUTURE GENERAL: Performed by: COLON & RECTAL SURGERY

## 2021-01-20 PROCEDURE — A9270 NON-COVERED ITEM OR SERVICE: HCPCS | Performed by: COLON & RECTAL SURGERY

## 2021-01-20 PROCEDURE — 502704 HCHG DEVICE, LIGASURE IMPACT: Performed by: COLON & RECTAL SURGERY

## 2021-01-20 PROCEDURE — 160035 HCHG PACU - 1ST 60 MINS PHASE I: Performed by: COLON & RECTAL SURGERY

## 2021-01-20 PROCEDURE — 770006 HCHG ROOM/CARE - MED/SURG/GYN SEMI*

## 2021-01-20 PROCEDURE — 160002 HCHG RECOVERY MINUTES (STAT): Performed by: COLON & RECTAL SURGERY

## 2021-01-20 PROCEDURE — 0DNB0ZZ RELEASE ILEUM, OPEN APPROACH: ICD-10-PCS | Performed by: COLON & RECTAL SURGERY

## 2021-01-20 PROCEDURE — 82962 GLUCOSE BLOOD TEST: CPT

## 2021-01-20 PROCEDURE — 500380 HCHG DRAIN, PENROSE 1/4X12: Performed by: COLON & RECTAL SURGERY

## 2021-01-20 PROCEDURE — 700102 HCHG RX REV CODE 250 W/ 637 OVERRIDE(OP): Performed by: PHYSICIAN ASSISTANT

## 2021-01-20 PROCEDURE — 160041 HCHG SURGERY MINUTES - EA ADDL 1 MIN LEVEL 4: Performed by: COLON & RECTAL SURGERY

## 2021-01-20 PROCEDURE — 700101 HCHG RX REV CODE 250: Performed by: COLON & RECTAL SURGERY

## 2021-01-20 PROCEDURE — C1765 ADHESION BARRIER: HCPCS | Performed by: COLON & RECTAL SURGERY

## 2021-01-20 PROCEDURE — 700105 HCHG RX REV CODE 258: Performed by: COLON & RECTAL SURGERY

## 2021-01-20 PROCEDURE — 700111 HCHG RX REV CODE 636 W/ 250 OVERRIDE (IP): Performed by: ANESTHESIOLOGY

## 2021-01-20 PROCEDURE — A9270 NON-COVERED ITEM OR SERVICE: HCPCS | Performed by: PHYSICIAN ASSISTANT

## 2021-01-20 PROCEDURE — 700105 HCHG RX REV CODE 258: Performed by: ANESTHESIOLOGY

## 2021-01-20 PROCEDURE — 0WQFXZ2 REPAIR ABDOMINAL WALL, STOMA, EXTERNAL APPROACH: ICD-10-PCS | Performed by: COLON & RECTAL SURGERY

## 2021-01-20 PROCEDURE — A9270 NON-COVERED ITEM OR SERVICE: HCPCS | Performed by: ANESTHESIOLOGY

## 2021-01-20 PROCEDURE — 700101 HCHG RX REV CODE 250: Performed by: PHYSICIAN ASSISTANT

## 2021-01-20 PROCEDURE — 700111 HCHG RX REV CODE 636 W/ 250 OVERRIDE (IP): Performed by: PHYSICIAN ASSISTANT

## 2021-01-20 PROCEDURE — 160029 HCHG SURGERY MINUTES - 1ST 30 MINS LEVEL 4: Performed by: COLON & RECTAL SURGERY

## 2021-01-20 PROCEDURE — 700101 HCHG RX REV CODE 250: Performed by: ANESTHESIOLOGY

## 2021-01-20 PROCEDURE — 700102 HCHG RX REV CODE 250 W/ 637 OVERRIDE(OP): Performed by: ANESTHESIOLOGY

## 2021-01-20 RX ORDER — LABETALOL HYDROCHLORIDE 5 MG/ML
10 INJECTION, SOLUTION INTRAVENOUS EVERY 6 HOURS PRN
Status: DISCONTINUED | OUTPATIENT
Start: 2021-01-20 | End: 2021-01-22 | Stop reason: HOSPADM

## 2021-01-20 RX ORDER — POTASSIUM CHLORIDE 750 MG/1
10 TABLET, FILM COATED, EXTENDED RELEASE ORAL
Status: ON HOLD | COMMUNITY
End: 2022-05-22

## 2021-01-20 RX ORDER — FLUTICASONE PROPIONATE 93 UG/1
1 SPRAY, METERED NASAL
COMMUNITY
Start: 2020-12-16 | End: 2021-10-28

## 2021-01-20 RX ORDER — METOPROLOL TARTRATE 1 MG/ML
INJECTION, SOLUTION INTRAVENOUS PRN
Status: DISCONTINUED | OUTPATIENT
Start: 2021-01-20 | End: 2021-01-20 | Stop reason: SURG

## 2021-01-20 RX ORDER — DIPHENHYDRAMINE HYDROCHLORIDE 50 MG/ML
12.5 INJECTION INTRAMUSCULAR; INTRAVENOUS
Status: DISCONTINUED | OUTPATIENT
Start: 2021-01-20 | End: 2021-01-20 | Stop reason: HOSPADM

## 2021-01-20 RX ORDER — CEFAZOLIN SODIUM 2 G/100ML
2 INJECTION, SOLUTION INTRAVENOUS EVERY 8 HOURS
Status: COMPLETED | OUTPATIENT
Start: 2021-01-20 | End: 2021-01-21

## 2021-01-20 RX ORDER — HYDROMORPHONE HYDROCHLORIDE 1 MG/ML
0.1 INJECTION, SOLUTION INTRAMUSCULAR; INTRAVENOUS; SUBCUTANEOUS
Status: DISCONTINUED | OUTPATIENT
Start: 2021-01-20 | End: 2021-01-20 | Stop reason: HOSPADM

## 2021-01-20 RX ORDER — SODIUM CHLORIDE AND POTASSIUM CHLORIDE 150; 900 MG/100ML; MG/100ML
INJECTION, SOLUTION INTRAVENOUS CONTINUOUS
Status: DISCONTINUED | OUTPATIENT
Start: 2021-01-20 | End: 2021-01-22 | Stop reason: HOSPADM

## 2021-01-20 RX ORDER — SUCCINYLCHOLINE/SOD CL,ISO/PF 200MG/10ML
SYRINGE (ML) INTRAVENOUS PRN
Status: DISCONTINUED | OUTPATIENT
Start: 2021-01-20 | End: 2021-01-20 | Stop reason: SURG

## 2021-01-20 RX ORDER — MAGNESIUM SULFATE HEPTAHYDRATE 500 MG/ML
INJECTION, SOLUTION INTRAMUSCULAR; INTRAVENOUS PRN
Status: DISCONTINUED | OUTPATIENT
Start: 2021-01-20 | End: 2021-01-20 | Stop reason: SURG

## 2021-01-20 RX ORDER — SODIUM CHLORIDE, SODIUM LACTATE, POTASSIUM CHLORIDE, CALCIUM CHLORIDE 600; 310; 30; 20 MG/100ML; MG/100ML; MG/100ML; MG/100ML
INJECTION, SOLUTION INTRAVENOUS CONTINUOUS
Status: DISCONTINUED | OUTPATIENT
Start: 2021-01-20 | End: 2021-01-20 | Stop reason: HOSPADM

## 2021-01-20 RX ORDER — DIAZEPAM 5 MG/1
5 TABLET ORAL EVERY 6 HOURS PRN
Status: DISCONTINUED | OUTPATIENT
Start: 2021-01-20 | End: 2021-01-22 | Stop reason: HOSPADM

## 2021-01-20 RX ORDER — ONDANSETRON 2 MG/ML
4 INJECTION INTRAMUSCULAR; INTRAVENOUS EVERY 4 HOURS PRN
Status: DISCONTINUED | OUTPATIENT
Start: 2021-01-20 | End: 2021-01-22 | Stop reason: HOSPADM

## 2021-01-20 RX ORDER — MIDAZOLAM HYDROCHLORIDE 1 MG/ML
1 INJECTION INTRAMUSCULAR; INTRAVENOUS
Status: DISCONTINUED | OUTPATIENT
Start: 2021-01-20 | End: 2021-01-20 | Stop reason: HOSPADM

## 2021-01-20 RX ORDER — HYDROMORPHONE HYDROCHLORIDE 2 MG/ML
INJECTION, SOLUTION INTRAMUSCULAR; INTRAVENOUS; SUBCUTANEOUS PRN
Status: DISCONTINUED | OUTPATIENT
Start: 2021-01-20 | End: 2021-01-20 | Stop reason: SURG

## 2021-01-20 RX ORDER — LABETALOL HYDROCHLORIDE 5 MG/ML
5 INJECTION, SOLUTION INTRAVENOUS
Status: DISCONTINUED | OUTPATIENT
Start: 2021-01-20 | End: 2021-01-20 | Stop reason: HOSPADM

## 2021-01-20 RX ORDER — ONDANSETRON 2 MG/ML
INJECTION INTRAMUSCULAR; INTRAVENOUS PRN
Status: DISCONTINUED | OUTPATIENT
Start: 2021-01-20 | End: 2021-01-20 | Stop reason: SURG

## 2021-01-20 RX ORDER — ACETAMINOPHEN 500 MG
1000 TABLET ORAL EVERY 6 HOURS
Status: DISCONTINUED | OUTPATIENT
Start: 2021-01-20 | End: 2021-01-22 | Stop reason: HOSPADM

## 2021-01-20 RX ORDER — SCOLOPAMINE TRANSDERMAL SYSTEM 1 MG/1
1 PATCH, EXTENDED RELEASE TRANSDERMAL
Status: DISCONTINUED | OUTPATIENT
Start: 2021-01-20 | End: 2021-01-22 | Stop reason: HOSPADM

## 2021-01-20 RX ORDER — IPRATROPIUM BROMIDE AND ALBUTEROL SULFATE 2.5; .5 MG/3ML; MG/3ML
3 SOLUTION RESPIRATORY (INHALATION)
Status: DISCONTINUED | OUTPATIENT
Start: 2021-01-20 | End: 2021-01-20 | Stop reason: HOSPADM

## 2021-01-20 RX ORDER — HYDROMORPHONE HYDROCHLORIDE 1 MG/ML
0.4 INJECTION, SOLUTION INTRAMUSCULAR; INTRAVENOUS; SUBCUTANEOUS
Status: DISCONTINUED | OUTPATIENT
Start: 2021-01-20 | End: 2021-01-20 | Stop reason: HOSPADM

## 2021-01-20 RX ORDER — ONDANSETRON 2 MG/ML
4 INJECTION INTRAMUSCULAR; INTRAVENOUS
Status: COMPLETED | OUTPATIENT
Start: 2021-01-20 | End: 2021-01-20

## 2021-01-20 RX ORDER — ROCURONIUM BROMIDE 10 MG/ML
INJECTION, SOLUTION INTRAVENOUS PRN
Status: DISCONTINUED | OUTPATIENT
Start: 2021-01-20 | End: 2021-01-20 | Stop reason: SURG

## 2021-01-20 RX ORDER — OXYCODONE HCL 5 MG/5 ML
5 SOLUTION, ORAL ORAL
Status: COMPLETED | OUTPATIENT
Start: 2021-01-20 | End: 2021-01-20

## 2021-01-20 RX ORDER — HALOPERIDOL 5 MG/ML
1 INJECTION INTRAMUSCULAR
Status: COMPLETED | OUTPATIENT
Start: 2021-01-20 | End: 2021-01-20

## 2021-01-20 RX ORDER — DIPHENHYDRAMINE HYDROCHLORIDE 50 MG/ML
25 INJECTION INTRAMUSCULAR; INTRAVENOUS EVERY 6 HOURS PRN
Status: DISCONTINUED | OUTPATIENT
Start: 2021-01-20 | End: 2021-01-22 | Stop reason: HOSPADM

## 2021-01-20 RX ORDER — CEFAZOLIN SODIUM 1 G/3ML
INJECTION, POWDER, FOR SOLUTION INTRAMUSCULAR; INTRAVENOUS PRN
Status: DISCONTINUED | OUTPATIENT
Start: 2021-01-20 | End: 2021-01-20 | Stop reason: SURG

## 2021-01-20 RX ORDER — HYDROMORPHONE HYDROCHLORIDE 1 MG/ML
0.2 INJECTION, SOLUTION INTRAMUSCULAR; INTRAVENOUS; SUBCUTANEOUS
Status: DISCONTINUED | OUTPATIENT
Start: 2021-01-20 | End: 2021-01-20 | Stop reason: HOSPADM

## 2021-01-20 RX ORDER — METOPROLOL TARTRATE 50 MG/1
50 TABLET, FILM COATED ORAL 2 TIMES DAILY
Status: DISCONTINUED | OUTPATIENT
Start: 2021-01-20 | End: 2021-01-22 | Stop reason: HOSPADM

## 2021-01-20 RX ORDER — LIDOCAINE HYDROCHLORIDE 20 MG/ML
INJECTION, SOLUTION EPIDURAL; INFILTRATION; INTRACAUDAL; PERINEURAL PRN
Status: DISCONTINUED | OUTPATIENT
Start: 2021-01-20 | End: 2021-01-20 | Stop reason: SURG

## 2021-01-20 RX ORDER — SODIUM CHLORIDE, SODIUM LACTATE, POTASSIUM CHLORIDE, CALCIUM CHLORIDE 600; 310; 30; 20 MG/100ML; MG/100ML; MG/100ML; MG/100ML
INJECTION, SOLUTION INTRAVENOUS CONTINUOUS
Status: DISCONTINUED | OUTPATIENT
Start: 2021-01-20 | End: 2021-01-20

## 2021-01-20 RX ORDER — SPIRONOLACTONE 50 MG/1
50 TABLET, FILM COATED ORAL DAILY
Status: DISCONTINUED | OUTPATIENT
Start: 2021-01-21 | End: 2021-01-22 | Stop reason: HOSPADM

## 2021-01-20 RX ORDER — DEXAMETHASONE SODIUM PHOSPHATE 4 MG/ML
INJECTION, SOLUTION INTRA-ARTICULAR; INTRALESIONAL; INTRAMUSCULAR; INTRAVENOUS; SOFT TISSUE PRN
Status: DISCONTINUED | OUTPATIENT
Start: 2021-01-20 | End: 2021-01-20 | Stop reason: SURG

## 2021-01-20 RX ORDER — OXYCODONE HCL 5 MG/5 ML
10 SOLUTION, ORAL ORAL
Status: COMPLETED | OUTPATIENT
Start: 2021-01-20 | End: 2021-01-20

## 2021-01-20 RX ORDER — CALCIUM CARBONATE 500 MG/1
500 TABLET, CHEWABLE ORAL
Status: DISCONTINUED | OUTPATIENT
Start: 2021-01-20 | End: 2021-01-22 | Stop reason: HOSPADM

## 2021-01-20 RX ADMIN — HYDROMORPHONE HYDROCHLORIDE 0.4 MG: 1 INJECTION, SOLUTION INTRAMUSCULAR; INTRAVENOUS; SUBCUTANEOUS at 16:32

## 2021-01-20 RX ADMIN — SODIUM CHLORIDE, POTASSIUM CHLORIDE, SODIUM LACTATE AND CALCIUM CHLORIDE: 600; 310; 30; 20 INJECTION, SOLUTION INTRAVENOUS at 13:46

## 2021-01-20 RX ADMIN — HYDROMORPHONE HYDROCHLORIDE 0.5 MG: 2 INJECTION, SOLUTION INTRAMUSCULAR; INTRAVENOUS; SUBCUTANEOUS at 15:35

## 2021-01-20 RX ADMIN — MAGNESIUM SULFATE HEPTAHYDRATE 2 G: 500 INJECTION, SOLUTION INTRAMUSCULAR; INTRAVENOUS at 15:20

## 2021-01-20 RX ADMIN — ACETAMINOPHEN 1000 MG: 500 TABLET ORAL at 21:14

## 2021-01-20 RX ADMIN — DEXAMETHASONE SODIUM PHOSPHATE 8 MG: 4 INJECTION, SOLUTION INTRA-ARTICULAR; INTRALESIONAL; INTRAMUSCULAR; INTRAVENOUS; SOFT TISSUE at 15:20

## 2021-01-20 RX ADMIN — SUGAMMADEX 200 MG: 100 INJECTION, SOLUTION INTRAVENOUS at 16:07

## 2021-01-20 RX ADMIN — CEFAZOLIN 2 G: 330 INJECTION, POWDER, FOR SOLUTION INTRAMUSCULAR; INTRAVENOUS at 15:19

## 2021-01-20 RX ADMIN — HYDROMORPHONE HYDROCHLORIDE: 10 INJECTION, SOLUTION INTRAMUSCULAR; INTRAVENOUS; SUBCUTANEOUS at 21:26

## 2021-01-20 RX ADMIN — ONDANSETRON 4 MG: 2 INJECTION INTRAMUSCULAR; INTRAVENOUS at 16:28

## 2021-01-20 RX ADMIN — MIDAZOLAM HYDROCHLORIDE 1 MG: 1 INJECTION, SOLUTION INTRAMUSCULAR; INTRAVENOUS at 16:54

## 2021-01-20 RX ADMIN — OXYCODONE HYDROCHLORIDE 10 MG: 5 SOLUTION ORAL at 18:17

## 2021-01-20 RX ADMIN — METOPROLOL TARTRATE 2.5 MG: 5 INJECTION, SOLUTION INTRAVENOUS at 15:52

## 2021-01-20 RX ADMIN — POTASSIUM CHLORIDE AND SODIUM CHLORIDE: 900; 150 INJECTION, SOLUTION INTRAVENOUS at 21:14

## 2021-01-20 RX ADMIN — SODIUM CHLORIDE, POTASSIUM CHLORIDE, SODIUM LACTATE AND CALCIUM CHLORIDE: 600; 310; 30; 20 INJECTION, SOLUTION INTRAVENOUS at 16:56

## 2021-01-20 RX ADMIN — LIDOCAINE HYDROCHLORIDE 5 ML: 20 INJECTION, SOLUTION EPIDURAL; INFILTRATION; INTRACAUDAL at 15:20

## 2021-01-20 RX ADMIN — HYDROMORPHONE HYDROCHLORIDE 0.4 MG: 1 INJECTION, SOLUTION INTRAMUSCULAR; INTRAVENOUS; SUBCUTANEOUS at 16:27

## 2021-01-20 RX ADMIN — Medication 100 MG: at 15:20

## 2021-01-20 RX ADMIN — ONDANSETRON 4 MG: 2 INJECTION INTRAMUSCULAR; INTRAVENOUS at 15:20

## 2021-01-20 RX ADMIN — HALOPERIDOL LACTATE 1 MG: 5 INJECTION, SOLUTION INTRAMUSCULAR at 16:38

## 2021-01-20 RX ADMIN — HYDROMORPHONE HYDROCHLORIDE 0.4 MG: 1 INJECTION, SOLUTION INTRAMUSCULAR; INTRAVENOUS; SUBCUTANEOUS at 18:24

## 2021-01-20 RX ADMIN — METOPROLOL TARTRATE 2.5 MG: 5 INJECTION, SOLUTION INTRAVENOUS at 15:42

## 2021-01-20 RX ADMIN — HYDROMORPHONE HYDROCHLORIDE 0.2 MG: 1 INJECTION, SOLUTION INTRAMUSCULAR; INTRAVENOUS; SUBCUTANEOUS at 16:37

## 2021-01-20 RX ADMIN — HALOPERIDOL LACTATE 1 MG: 5 INJECTION, SOLUTION INTRAMUSCULAR at 18:15

## 2021-01-20 RX ADMIN — PROPOFOL 150 MG: 10 INJECTION, EMULSION INTRAVENOUS at 15:20

## 2021-01-20 RX ADMIN — ROCURONIUM BROMIDE 50 MG: 10 INJECTION, SOLUTION INTRAVENOUS at 15:21

## 2021-01-20 RX ADMIN — HYDROMORPHONE HYDROCHLORIDE 0.4 MG: 1 INJECTION, SOLUTION INTRAMUSCULAR; INTRAVENOUS; SUBCUTANEOUS at 18:17

## 2021-01-20 RX ADMIN — HYDROMORPHONE HYDROCHLORIDE 0.2 MG: 1 INJECTION, SOLUTION INTRAMUSCULAR; INTRAVENOUS; SUBCUTANEOUS at 18:29

## 2021-01-20 RX ADMIN — POVIDONE IODINE 15 ML: 100 SOLUTION TOPICAL at 13:46

## 2021-01-20 ASSESSMENT — PAIN DESCRIPTION - PAIN TYPE
TYPE: CHRONIC PAIN;SURGICAL PAIN
TYPE: SURGICAL PAIN
TYPE: CHRONIC PAIN;SURGICAL PAIN
TYPE: SURGICAL PAIN
TYPE: CHRONIC PAIN;SURGICAL PAIN
TYPE: CHRONIC PAIN;SURGICAL PAIN

## 2021-01-20 ASSESSMENT — FIBROSIS 4 INDEX: FIB4 SCORE: 1.63

## 2021-01-20 ASSESSMENT — PAIN SCALES - GENERAL: PAIN_LEVEL: 6

## 2021-01-20 NOTE — ANESTHESIA PROCEDURE NOTES
Airway    Date/Time: 1/20/2021 3:21 PM  Performed by: Darius Bui M.D.  Authorized by: Darius Bui M.D.     Location:  OR  Urgency:  Elective  Indications for Airway Management:  Anesthesia      Spontaneous Ventilation: absent    Sedation Level:  Deep  Preoxygenated: Yes    Mask Difficulty Assessment:  0 - not attempted  Final Airway Type:  Endotracheal airway  Final Endotracheal Airway:  ETT  Cuffed: Yes      Insertion Site:  Oral  Blade Type:  Graham  Laryngoscope Blade/Videolaryngoscope Blade Size:  2  ETT Size (mm):  7.0  Measured from:  Lips  ETT to Lips (cm):  21  Placement Verified by: capnometry    Number of Attempts at Approach:  1

## 2021-01-20 NOTE — ANESTHESIA PREPROCEDURE EVALUATION
65yoF with PMHx of TRUDY, COPD, HTN, migraines, PAFib/Flutter    +Asthma, chronic pain  +GERD    Hx of DVT    Covid neg  NPO  Multiple allergies reviewed    Relevant Problems   ANESTHESIA   (+) Sleep apnea      PULMONARY   (+) COPD (chronic obstructive pulmonary disease) (HCC)   (+) History of MRSA infection   (+) History of infection with vancomycin resistant Enterococcus (VRE)      NEURO   (+) History of DVT (deep vein thrombosis)   (+) History of MRSA infection   (+) History of infection with vancomycin resistant Enterococcus (VRE)      CARDIAC   (+) DVT (deep venous thrombosis) (HCC)   (+) Hypertension   (+) Migraine   (+) Paroxysmal atrial fibrillation (HCC)      GI   (+) GERD (gastroesophageal reflux disease)       Physical Exam    Airway   Mallampati: II       Cardiovascular - normal exam     Dental - normal exam           Pulmonary - normal exam     Abdominal - normal exam     Neurological - normal exam                 Anesthesia Plan    ASA 3   ASA physical status 3 criteria: COPD    Plan - general       Airway plan will be ETT        Induction: intravenous and rapid sequence    Postoperative Plan: Postoperative administration of opioids is intended.    Pertinent diagnostic labs and testing reviewed    Informed Consent:    Anesthetic plan and risks discussed with patient.

## 2021-01-21 ENCOUNTER — APPOINTMENT (OUTPATIENT)
Dept: RADIOLOGY | Facility: MEDICAL CENTER | Age: 66
DRG: 336 | End: 2021-01-21
Attending: PHYSICIAN ASSISTANT
Payer: MEDICARE

## 2021-01-21 LAB
ALBUMIN SERPL BCP-MCNC: 3.6 G/DL (ref 3.2–4.9)
ALBUMIN/GLOB SERPL: 1.3 G/DL
ALP SERPL-CCNC: 105 U/L (ref 30–99)
ALT SERPL-CCNC: 27 U/L (ref 2–50)
ANION GAP SERPL CALC-SCNC: 10 MMOL/L (ref 7–16)
AST SERPL-CCNC: 21 U/L (ref 12–45)
BASOPHILS # BLD AUTO: 0.3 % (ref 0–1.8)
BASOPHILS # BLD: 0.03 K/UL (ref 0–0.12)
BILIRUB SERPL-MCNC: 0.6 MG/DL (ref 0.1–1.5)
BUN SERPL-MCNC: 15 MG/DL (ref 8–22)
CALCIUM SERPL-MCNC: 8.8 MG/DL (ref 8.5–10.5)
CHLORIDE SERPL-SCNC: 102 MMOL/L (ref 96–112)
CO2 SERPL-SCNC: 24 MMOL/L (ref 20–33)
CREAT SERPL-MCNC: 0.89 MG/DL (ref 0.5–1.4)
EOSINOPHIL # BLD AUTO: 0 K/UL (ref 0–0.51)
EOSINOPHIL NFR BLD: 0 % (ref 0–6.9)
ERYTHROCYTE [DISTWIDTH] IN BLOOD BY AUTOMATED COUNT: 45.1 FL (ref 35.9–50)
GLOBULIN SER CALC-MCNC: 2.7 G/DL (ref 1.9–3.5)
GLUCOSE SERPL-MCNC: 127 MG/DL (ref 65–99)
HCT VFR BLD AUTO: 36.4 % (ref 37–47)
HGB BLD-MCNC: 11.8 G/DL (ref 12–16)
IMM GRANULOCYTES # BLD AUTO: 0.04 K/UL (ref 0–0.11)
IMM GRANULOCYTES NFR BLD AUTO: 0.3 % (ref 0–0.9)
LYMPHOCYTES # BLD AUTO: 0.88 K/UL (ref 1–4.8)
LYMPHOCYTES NFR BLD: 7.6 % (ref 22–41)
MCH RBC QN AUTO: 31.2 PG (ref 27–33)
MCHC RBC AUTO-ENTMCNC: 32.1 G/DL (ref 33.6–35)
MCV RBC AUTO: 97.1 FL (ref 81.4–97.8)
MONOCYTES # BLD AUTO: 1 K/UL (ref 0–0.85)
MONOCYTES NFR BLD AUTO: 8.7 % (ref 0–13.4)
NEUTROPHILS # BLD AUTO: 9.58 K/UL (ref 2–7.15)
NEUTROPHILS NFR BLD: 83.1 % (ref 44–72)
NRBC # BLD AUTO: 0 K/UL
NRBC BLD-RTO: 0 /100 WBC
PLATELET # BLD AUTO: 179 K/UL (ref 164–446)
PMV BLD AUTO: 10.4 FL (ref 9–12.9)
POTASSIUM SERPL-SCNC: 4.2 MMOL/L (ref 3.6–5.5)
PROT SERPL-MCNC: 6.3 G/DL (ref 6–8.2)
RBC # BLD AUTO: 3.75 M/UL (ref 4.2–5.4)
SODIUM SERPL-SCNC: 136 MMOL/L (ref 135–145)
WBC # BLD AUTO: 11.5 K/UL (ref 4.8–10.8)

## 2021-01-21 PROCEDURE — 700101 HCHG RX REV CODE 250: Performed by: PHYSICIAN ASSISTANT

## 2021-01-21 PROCEDURE — 770006 HCHG ROOM/CARE - MED/SURG/GYN SEMI*

## 2021-01-21 PROCEDURE — A9270 NON-COVERED ITEM OR SERVICE: HCPCS | Performed by: PHYSICIAN ASSISTANT

## 2021-01-21 PROCEDURE — 05HB33Z INSERTION OF INFUSION DEVICE INTO RIGHT BASILIC VEIN, PERCUTANEOUS APPROACH: ICD-10-PCS | Performed by: PHYSICIAN ASSISTANT

## 2021-01-21 PROCEDURE — 700102 HCHG RX REV CODE 250 W/ 637 OVERRIDE(OP): Performed by: PHYSICIAN ASSISTANT

## 2021-01-21 PROCEDURE — 700111 HCHG RX REV CODE 636 W/ 250 OVERRIDE (IP): Performed by: PHYSICIAN ASSISTANT

## 2021-01-21 PROCEDURE — 80053 COMPREHEN METABOLIC PANEL: CPT

## 2021-01-21 PROCEDURE — 85025 COMPLETE CBC W/AUTO DIFF WBC: CPT

## 2021-01-21 PROCEDURE — 700105 HCHG RX REV CODE 258: Performed by: PHYSICIAN ASSISTANT

## 2021-01-21 PROCEDURE — 76937 US GUIDE VASCULAR ACCESS: CPT

## 2021-01-21 RX ADMIN — ACETAMINOPHEN 1000 MG: 500 TABLET ORAL at 02:22

## 2021-01-21 RX ADMIN — DIAZEPAM 5 MG: 5 TABLET ORAL at 02:25

## 2021-01-21 RX ADMIN — METOPROLOL TARTRATE 50 MG: 50 TABLET, FILM COATED ORAL at 18:06

## 2021-01-21 RX ADMIN — DIAZEPAM 5 MG: 5 TABLET ORAL at 18:06

## 2021-01-21 RX ADMIN — ONDANSETRON 4 MG: 2 INJECTION INTRAMUSCULAR; INTRAVENOUS at 18:06

## 2021-01-21 RX ADMIN — METOPROLOL TARTRATE 50 MG: 50 TABLET, FILM COATED ORAL at 09:12

## 2021-01-21 RX ADMIN — CEFAZOLIN SODIUM 2 G: 2 INJECTION, SOLUTION INTRAVENOUS at 02:23

## 2021-01-21 RX ADMIN — POTASSIUM CHLORIDE AND SODIUM CHLORIDE: 900; 150 INJECTION, SOLUTION INTRAVENOUS at 11:46

## 2021-01-21 RX ADMIN — Medication: at 11:34

## 2021-01-21 RX ADMIN — ONDANSETRON 4 MG: 2 INJECTION INTRAMUSCULAR; INTRAVENOUS at 10:22

## 2021-01-21 RX ADMIN — ACETAMINOPHEN 1000 MG: 500 TABLET ORAL at 09:12

## 2021-01-21 RX ADMIN — DIAZEPAM 5 MG: 5 TABLET ORAL at 09:13

## 2021-01-21 RX ADMIN — ACETAMINOPHEN 1000 MG: 500 TABLET ORAL at 21:57

## 2021-01-21 RX ADMIN — CEFAZOLIN SODIUM 2 G: 2 INJECTION, SOLUTION INTRAVENOUS at 10:22

## 2021-01-21 RX ADMIN — ENOXAPARIN SODIUM 40 MG: 40 INJECTION SUBCUTANEOUS at 09:13

## 2021-01-21 RX ADMIN — Medication: at 22:58

## 2021-01-21 RX ADMIN — SPIRONOLACTONE 50 MG: 50 TABLET ORAL at 09:12

## 2021-01-21 SDOH — ECONOMIC STABILITY: TRANSPORTATION INSECURITY
IN THE PAST 12 MONTHS, HAS LACK OF TRANSPORTATION KEPT YOU FROM MEETINGS, WORK, OR FROM GETTING THINGS NEEDED FOR DAILY LIVING?: NO

## 2021-01-21 SDOH — ECONOMIC STABILITY: FOOD INSECURITY: WITHIN THE PAST 12 MONTHS, THE FOOD YOU BOUGHT JUST DIDN'T LAST AND YOU DIDN'T HAVE MONEY TO GET MORE.: NEVER TRUE

## 2021-01-21 SDOH — HEALTH STABILITY: MENTAL HEALTH: HOW OFTEN DO YOU HAVE A DRINK CONTAINING ALCOHOL?: 2-4 TIMES A MONTH

## 2021-01-21 SDOH — HEALTH STABILITY: MENTAL HEALTH: HOW OFTEN DO YOU HAVE 6 OR MORE DRINKS ON ONE OCCASION?: DAILY OR ALMOST DAILY

## 2021-01-21 SDOH — ECONOMIC STABILITY: FOOD INSECURITY: WITHIN THE PAST 12 MONTHS, YOU WORRIED THAT YOUR FOOD WOULD RUN OUT BEFORE YOU GOT MONEY TO BUY MORE.: NEVER TRUE

## 2021-01-21 SDOH — ECONOMIC STABILITY: TRANSPORTATION INSECURITY
IN THE PAST 12 MONTHS, HAS THE LACK OF TRANSPORTATION KEPT YOU FROM MEDICAL APPOINTMENTS OR FROM GETTING MEDICATIONS?: NO

## 2021-01-21 SDOH — HEALTH STABILITY: MENTAL HEALTH: HOW MANY STANDARD DRINKS CONTAINING ALCOHOL DO YOU HAVE ON A TYPICAL DAY?: 1 OR 2

## 2021-01-21 ASSESSMENT — COGNITIVE AND FUNCTIONAL STATUS - GENERAL
DRESSING REGULAR LOWER BODY CLOTHING: A LITTLE
SUGGESTED CMS G CODE MODIFIER DAILY ACTIVITY: CI
MOVING FROM LYING ON BACK TO SITTING ON SIDE OF FLAT BED: A LITTLE
DAILY ACTIVITIY SCORE: 23
SUGGESTED CMS G CODE MODIFIER MOBILITY: CI
MOBILITY SCORE: 23

## 2021-01-21 ASSESSMENT — PAIN DESCRIPTION - PAIN TYPE
TYPE: ACUTE PAIN;SURGICAL PAIN
TYPE: ACUTE PAIN;CHRONIC PAIN
TYPE: ACUTE PAIN

## 2021-01-21 ASSESSMENT — LIFESTYLE VARIABLES
EVER HAD A DRINK FIRST THING IN THE MORNING TO STEADY YOUR NERVES TO GET RID OF A HANGOVER: NO
TOTAL SCORE: 0
AVERAGE NUMBER OF DAYS PER WEEK YOU HAVE A DRINK CONTAINING ALCOHOL: 7
ALCOHOL_USE: YES
HAVE PEOPLE ANNOYED YOU BY CRITICIZING YOUR DRINKING: NO
CONSUMPTION TOTAL: NEGATIVE
TOTAL SCORE: 0
TOTAL SCORE: 0
EVER FELT BAD OR GUILTY ABOUT YOUR DRINKING: NO
HOW MANY TIMES IN THE PAST YEAR HAVE YOU HAD 5 OR MORE DRINKS IN A DAY: 0
DOES PATIENT WANT TO STOP DRINKING: NO
ON A TYPICAL DAY WHEN YOU DRINK ALCOHOL HOW MANY DRINKS DO YOU HAVE: 1
HAVE YOU EVER FELT YOU SHOULD CUT DOWN ON YOUR DRINKING: NO

## 2021-01-21 ASSESSMENT — ENCOUNTER SYMPTOMS
SHORTNESS OF BREATH: 0
PALPITATIONS: 0
CHILLS: 0
NAUSEA: 0
COUGH: 0
ABDOMINAL PAIN: 1
HEARTBURN: 0
VOMITING: 0
FEVER: 0

## 2021-01-21 ASSESSMENT — FIBROSIS 4 INDEX: FIB4 SCORE: 1.63

## 2021-01-21 NOTE — OR NURSING
"Pt A&OX4. VSS. Pt on 10 L of oxygen via oxymask - ERAS order. Midline abd incision with island dressing CDI. Penrose drain in place as well, unable to visualize. LLQ ileostomy with new appliance. Pt reports severe 10/10 pain to abd and chronic back pain - grimacing, tense, and restless. IV and PO pain medication administered - pt drifts off to sleep and calm but reports she is \"just closing eyes\" and \"yoga breathing\" and remains in \"a lot of pain.\" IV antiemetics administered for nausea, pt states nausea not resolved but tolerated sips of apple juice and PO pain medication. Room ready, awaiting change of shift.  "

## 2021-01-21 NOTE — ANESTHESIA TIME REPORT
Anesthesia Start and Stop Event Times     Date Time Event    1/20/2021 1456 Ready for Procedure     1516 Anesthesia Start     1620 Anesthesia Stop        Responsible Staff  01/20/21    Name Role Begin End    Darius Bui M.D. Anesth 1516 1620        Preop Diagnosis (Free Text):  Pre-op Diagnosis     ILEOSTOMY STENOSIS        Preop Diagnosis (Codes):    Post op Diagnosis  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HCC)      Premium Reason  A. 3PM - 7AM    Comments:

## 2021-01-21 NOTE — PROGRESS NOTES
2 RN skin check complete.   Devices in place: SCDs/oxymask.   Skin assessed under devices: above.     Old healed surgical scars noted. MLI with penrose drain at base of incision; dry, intact; old serosang drainage noted. LLQ ileostomy; appliance intact. Red, beefy stoma. Mild edema BLE noted. Spider veins. Bunion right 5th toe, patient has 3 toes enwrapped in bandaids for shoe protection. Dry, cracked heels. Healed surgical scar to back. No other skin demarcations/issues noted/reported.     Confirmed pressure ulcers found on: NA.   New potential pressure ulcers noted on: NA.    Wound consult placed for ostomy.   The following interventions in place: Pillows for limb positioning/elevation.

## 2021-01-21 NOTE — PROGRESS NOTES
Surgical Progress Note    Author: Lilly Johnson P.A.-C. Date & Time created: 2021   9:32 AM     Interval Events:  POD#1 Exploratory laparotomy, adhesiolysis, revision of ileostomy with release of prior graft of parastomal hernia repair. Tolerating clears without nausea/vomiting. Admits to typical incisional abdominal pain, controlled with dilaudid PCA. RN reporting that pain not controlled, however patient appears comfortable on exam. PCA escalated up minimally to 5mg lockout at 4 hours. The patient and I talked about continuing the PCA until discharge as she is opioid dependent and takes pain medication at home. She is anxious and does not feel comfortable relying on bedside RN to bring oral pain meds regularly.  Pt is ambulating some and she is voiding.     Review of Systems   Constitutional: Negative for chills and fever.   Respiratory: Negative for cough and shortness of breath.    Cardiovascular: Negative for chest pain and palpitations.   Gastrointestinal: Positive for abdominal pain (incisional). Negative for heartburn, nausea and vomiting.        LLQ ileostomy with some loose output and reports gas filling appliance last night.   Genitourinary: Negative for dysuria.     Hemodynamics:  Temp (24hrs), Av.5 °C (97.7 °F), Min:36 °C (96.8 °F), Max:36.7 °C (98.1 °F)  Temperature: 36.7 °C (98.1 °F)  Pulse  Av.2  Min: 53  Max: 85   Blood Pressure : 105/53     Respiratory:    Respiration: 16, Pulse Oximetry: 94 %           Neuro:  GCS       Fluids:    Intake/Output Summary (Last 24 hours) at 2021 0932  Last data filed at 2021 0600  Gross per 24 hour   Intake 1357.5 ml   Output 400 ml   Net 957.5 ml     Weight: 72.6 kg (160 lb)  Current Diet Order   Procedures   • Diet Order Diet: Full Liquid     Physical Exam  Constitutional:       Appearance: She is obese.   HENT:      Head: Normocephalic and atraumatic.      Mouth/Throat:      Pharynx: Oropharynx is clear.   Eyes:      Conjunctiva/sclera:  Conjunctivae normal.   Neck:      Musculoskeletal: Normal range of motion.   Cardiovascular:      Rate and Rhythm: Normal rate and regular rhythm.   Pulmonary:      Effort: Pulmonary effort is normal.   Abdominal:      General: There is distension (mild).      Palpations: Abdomen is soft. There is no mass.      Tenderness: There is abdominal tenderness (incisional). There is no guarding or rebound.      Comments: Ileostomy pink with appliance in place, some loose stool.   Musculoskeletal: Normal range of motion.   Skin:     General: Skin is warm and dry.      Findings: No erythema or rash.      Comments: Midline Incision with staples and penrose drain, minimal serosanguinous ooze on dressing.   Neurological:      Mental Status: She is alert and oriented to person, place, and time.   Psychiatric:         Mood and Affect: Mood normal.       Labs:  Recent Results (from the past 24 hour(s))   ACCU-CHEK GLUCOSE    Collection Time: 01/20/21  1:42 PM   Result Value Ref Range    Glucose - Accu-Ck 100 (H) 65 - 99 mg/dL     Medical Decision Making, by Problem:  There are no active hospital problems to display for this patient.    Plan:  Pt is alert and oriented, NAD. Breathing unlabored. Tolerating PO.  Incision with staples and penrose, keep dressing in place until POD2, reinforce if needed. VS stable. Labs pending. Encouraged ambulation and incentive spirometry. Ileostomy pink with some stool output. Pt reports lots of gas in appliance last night. Dilaudid PCA increased to 5mg lockout in 4 hours.  Okay to advance diet to full liquids. Discussed possible discharge home tomorrow. Continue Dilaudid PCA until discharge. One more dose of Abx, then discontinue. Pt also seen and examined by Dr. Cruz.      Quality Measures:  Quality-Core Measures   Sage catheter::  No Sage  DVT prophylaxis pharmacological::  Enoxaparin (Lovenox)  DVT prophylaxis - mechanical:  SCDs      Discussed patient condition with RN, Patient and   Sasse

## 2021-01-21 NOTE — CARE PLAN
Problem: Communication  Goal: The ability to communicate needs accurately and effectively will improve  Outcome: PROGRESSING AS EXPECTED  Patient able to tolerate needs. Utilizes call light appropriately.     Problem: Safety  Goal: Will remain free from injury  Outcome: PROGRESSING AS EXPECTED  Educated fall risk and not getting OOB unassisted. Verbalized understanding. Patient brought cane just in case.     Problem: Bowel/Gastric:  Goal: Normal bowel function is maintained or improved  Outcome: PROGRESSING AS EXPECTED  Hypoactive BS x 4 noted. + green, watery effluent in ostomy.     Problem: Knowledge Deficit  Goal: Knowledge of disease process/condition, treatment plan, diagnostic tests, and medications will improve  Outcome: PROGRESSING AS EXPECTED  Educated about PCA. Verbalized/demonstrated understanding.

## 2021-01-21 NOTE — CARE PLAN
Problem: Safety  Goal: Will remain free from injury  Outcome: PROGRESSING AS EXPECTED  Note: Pt educated on safety measures, calls appropriately      Problem: Bowel/Gastric:  Goal: Normal bowel function is maintained or improved  Outcome: PROGRESSING AS EXPECTED  Note: Ostomy intact with stool.

## 2021-01-21 NOTE — OP REPORT
DATE OF SERVICE:  01/20/2021     PREOPERATIVE DIAGNOSES:  Pain and obstruction at ileostomy.     POSTOPERATIVE DIAGNOSES:  1.  Pain and obstruction at ileostomy.  2.  Complex parastomal hernia.  3.  Adhesive partial small bowel obstruction.     OPERATIONS PERFORMED:  1.  Exploratory laparotomy.  2.  Adhesiolysis.  3.  Revision of ileostomy with release of prior graft of parastomal hernia   repair.     SURGEON:  Kevin Cruz MD     ASSISTANT:  Lilly Johnson PA-C     ANESTHESIOLOGIST:  Darius Bui MD     INDICATIONS FOR PROCEDURE:  The patient is a 65-year-old female with previous   total colectomy and ileostomy related to Crohn's disease, who has had multiple   previous abdominal operations and has had prior parastomal hernia repair with   biological graft.  She has battled with severe episodes of obstruction right   near the ileostomy with pain and then absence of output from the ostomy   followed at times by high pressure shooting of ileostomy contents indicative   of a tight obstructive process.  She comes today for revision of the ileostomy   site and attempted alleviation of this problem.     DESCRIPTION OF PROCEDURE:  After an extensive informed consent discussion   process, the patient was brought to the operating room.  She was placed in a   supine position on the operating table.  After induction of general anesthesia   and placement of an endotracheal tube, the abdomen was prepped and draped in   the usual sterile fashion.  After administration of intravenous antibiotics, a   careful assessment of the abdominal wall and the ostomy itself was performed.    Digital exam demonstrated that there was a tight area at the fascial and   subfascial level.  Examining the ostomy itself, I felt that the only way we   were going to obtain a satisfactory solution would be to open the midline.     Midline incision was made through a portion of her old scar, keeping it   somewhat more limited in total length.   Dissection was carefully carried down   through the subcutaneous tissue, scar tissue and the fascia was opened without   enterotomies.  We then very carefully opened up the remainder of the midline   scar with this limited area and freed up the anterior abdominal wall from   adhesions.     Adhesiolysis was then performed to free up the distal ileum as it entered the   area of the ostomy.  There were some tight adhesions here.  These were   carefully lysed without enterotomy and this appeared to go a long way to   alleviating the tight area.  A repeat digital exam through the ostomy   demonstrated that it was probably 50% improved.  After carefully assessing the   situation, it appeared that the other 50% of the problem likely emanated from   the prior biological graft, which resulted in a semicircular firmness on the   outer part of the fascia lateral to the stoma and inferior to it.  With the   bowel in a configuration, it might result from peristalsis, it could become   tightly pinched, but if it was mobile, then it might not be obstructive.  In   order to solve this that biological graft was cut in two places, which   released its tightness or tension and resulted in a more generous passage   through the abdominal wall for the ostomy.  The ileostomy itself was revised   to make it more protuberant using the release of the adhesions beneath it and   was now free and this revision to the fascial and subfascial area now resulted   in a more prominent symmetrical and protuberant ileostomy, which was much   more favorable.  We lysed additional adhesions over a length of the distal   ileum.  Copious irrigation was then used to wash out the abdomen and pelvis.    Once tiny bleeder on the small bowel was treated with a 3-0 Vicryl suture to   avoid cautery, careful inspection demonstrated no serosal injuries or   enterotomies.  Seprafilm was generously placed around the ostomy and over all   the areas of adhesiolysis and  over the midline viscera.  We then closed the   abdominal fascia with #2 Vicryl suture in a running fashion.  The wound was   then irrigated and closed with skin staples over a quarter-inch subcutaneous   Penrose drain.  A new ileostomy appliance was applied.     Needle, sponge and instrument counts were correct at the end of the case.     ESTIMATED BLOOD LOSS:  Less than 50 mL.     COMPLICATIONS:  None.        ______________________________  MD JACLYN JAMA/ROXANE/ANA    DD:  01/20/2021 16:21  DT:  01/20/2021 16:56    Job#:  [id number]

## 2021-01-21 NOTE — WOUND TEAM
Pt with established ileostomy. Pt reports that she's had it for over 20 years. Pt has own supplies at bedside. Reports it was just changed yesterday and doesn't need to be changed today. Appliance was not leaking upon assessment. Pt will ask for assistance from bedside nursing tomorrow if needed.

## 2021-01-21 NOTE — PROGRESS NOTES
Spiritual Care Note    Patient Information     Patient's Name: Jana Overton   MRN: [id number]    YOB: 1955   Age and Gender: 65 y.o. female   Service Area: U   Room (and Bed): [place] [address]   Ethnicity or Nationality:     Primary Language: English   Rastafarian/Spiritual preference: Catholic   Place of Residence: [place]   Family/Friends/Others Present: No   Clinical Team Present: No   Medical Diagnosis(-es)/Procedure(s): Mechanical complication of enterostomy   Code Status: Full Code    Date of Admission: 1/20/2021   Length of Stay: 1 days        Spiritual Care Provider Information:  Name of Spiritual Care Provider: Jessie Patel  Title of Spiritual Care Provider: Associate   Phone Number: [phone]  E-mail: [email or web address]  Total time : 10 minutes    Spiritual Screen Results:    Gen Nursing  Spiritual Screen  Would you like to receive a visit from our Spiritual Care team or your own Restorationism or spiritual leader?: Yes  Was spiritual care education provided to the patient?: Yes     Palliative Care  PC Rastafarian/Spiritual Screening  Was spiritual care education provided to the patient?: Yes      Encounter/Request Information  Encounter/Request Type   Visited With: Patient  Nature of the Visit: Initial, On shift  General Visit: Yes  Referral From/ Origin of Request: Epic nursing    Religous Needs/Values  Rastafarian Needs Visit  Rastafarian Needs: Prayer    Spiritual Assessment     Spiritual Care Encounters    Observations/Symptoms: Accepting, Thankfulness    Interaction/Conversation: Very pleasant pt welcomed the  and requested prayer for herself, her  and her roommate.    Assessment: Need    Need: Seeking Spiritual Assistance and Support    Interventions: Compassionate presence, active listening, prayer.    Outcomes: Spiritual Comfort    Plan: Visit Upon Request    Notes:

## 2021-01-21 NOTE — PROGRESS NOTES
[id number]: Received report from night RN. Pt is alert and oriented, NAD. VSS.  Breathing unlabored. Tolerating PO, advanced to FLD. Incision with staples and penrose intact. Labs pending. Encouraged ambulation and incentive spirometry. Ileostomy pink with + stool output. Dilaudid PCA increased to 5mg lockout in 4 hours d/t severe pain. Midline placed d/t poor vasculature.    Bed locked/lowest position. Call light/personal belongings. PCA bolus button within reach. All needs met throughout shift.      September 3, 2019      Roberto Carlos [place]  [address] [facility] [address]        Your PSA came back elevated.  I would like to recheck this in 3 months.  If still elevated we will want to have you see urology. Let me know you got this information or if this does not make sense.    The blood sugar is still running high so make sure you are working to decrease sugars and carbohydrates in your diet and exercising regularly.      The results of your recent Kidney Tests, Lipid profile and Liver Panel were within normal limits. The results are now released on My Chart. Please contact me if you have further questions or concerns.         Resulted Orders   Lipid Panel ([facility])   Result Value Ref Range    Cholesterol 182 0 - 199 mg/dL    Triglycerides 55 0 - 149 mg/dL    HDL Cholesterol 87 40 - 150 mg/dL    LDL Cholesterol Direct 84 0 - 130 mg/dL    Cholesterol/HDL Ratio 2 0 - 5   Comprehensive Metobolic Panel ([facility])   Result Value Ref Range    Carbon Dioxide 35.6 (A) 20 - 32 mmol/L      Comment:      ran twice    Creatinine 0.84 0.70 - 1.18 mg/dL    Glucose 110 (A) 60 - 99 mg/dL    Sodium 144.2 135 - 146 mmol/L    Potassium 4.96 3.5 - 5.3 mmol/L    Chloride 103.2 98 - 110 mmol/L    Protein Total 7.2 6.1 - 8.1 g/dL    Albumin 4.6 3.6 - 5.1 g/dL    Alkaline Phosphatase 75 33 - 130 U/L    ALT 8 5 - 30 U/L    AST 17 7 - 31 U/L    Bilirubin Total 1.3 (A) 0.2 - 1.2 mg/dL    Urea Nitrogen 12 7 - 25 mg/dL    Calcium 10.0 8.6 - 10.3 mg/dL    BUN/Creatinine Ratio 14.3 6 - 22    Globulin Calculated 2.6 1.9 - 3.7    A/G Ratio 1.8 1 - 2.5   HCL PSA, SCREENING ([facility])   Result Value Ref Range    Abbott PSA 5.4 (H) < OR = 4.0 ng/mL      Comment:      The total PSA value from this assay system is   standardized against the WHO standard. The test   result will be approximately 20% lower when compared   to the equimolar-standardized total PSA (Lina   [place]). Comparison of serial PSA results should be   interpreted with this  fact in mind.     This test was performed using the Siemens   chemiluminescent method. Values obtained from   different assay methods cannot be used  interchangeably. PSA levels, regardless of  value, should not be interpreted as absolute  evidence of the presence or absence of disease.         If you have any questions or concerns, please call the clinic at the number listed above.       Sincerely,        Leonard Palacios MD

## 2021-01-21 NOTE — ANESTHESIA POSTPROCEDURE EVALUATION
Patient: Jana Overton    Procedure Summary     Date: 01/20/21 Room / Location: [place] [address] / SURGERY [facility]    Anesthesia Start: 1516 Anesthesia Stop: 1620    Procedure: CREATION, ILEOSTOMY- COMPLEX REVISION (Abdomen) Diagnosis: (ILEOSTOMY STENOSIS)    Surgeons: Kevin Cruz M.D. Responsible Provider: Darius Bui M.D.    Anesthesia Type: general ASA Status: 3          Final Anesthesia Type: general  Last vitals  BP   Blood Pressure : 126/47    Temp   36 °C (96.8 °F)    Pulse   Pulse: 64   Resp   14    SpO2   99 %      Anesthesia Post Evaluation    Patient location during evaluation: PACU  Patient participation: complete - patient participated  Level of consciousness: awake  Pain score: 6    Airway patency: patent  Anesthetic complications: no  Cardiovascular status: adequate and hemodynamically stable  Respiratory status: acceptable and face mask  Hydration status: acceptable    PONV: none           Nurse Pain Score: 6 (NPRS)

## 2021-01-21 NOTE — PROCEDURES
Vascular Access Team    Date of Insertion: 1/21/21  Arm Circumference: n/a  Line Length: 10  Line Size: 20  Vein Occupancy %: 32  Reason for Midline: ACCESS  Labs: on 1/15/21 WBC 9.8, , BUN 33, Cr 1.13, GFR 48, INR na    Orders confirmed, vessel patency confirmed with ultrasound. Risks and benefits of procedure explained to patient and education regarding line associated bloodstream infections provided. Questions answered.     PowerGlide Midline placed in RUE per licensed provider order with ultrasound guidance. 20g, 10 cm line placed in basilic vein after 1 attempt(s).  Catheter inserted with brisk blood return. Secured with 0cm external from insertion site.  Line flushed without resistance with 10 mL 0.9% normal saline.  Midline secured with Biopatch and Tegaderm.     Midline placement is confirmed by nurse using ultrasound and ability to flush and draw blood. Midline is appropriate for use at this time.  No X-ray is needed for placement confirmation. Pt tolerated procedure well.  Patient condition relayed to unit RN or ordering physician via this post procedure note in the EMR.    Ultrasound images uploaded to PACS and viewable in the EMR - yes  Ultrasound imaged printed and placed in paper chart - no      BARD PowerGlide Midline ref # [id number], Lot # [id number], Expiration Date 11/30/2021

## 2021-01-21 NOTE — PROGRESS NOTES
"Received report from PACU RN; assumed care upon arrival to floor at approximately 1945. Patient refused to pivot transfer, scooted from [facility] to bed d/t \"severe neuropathy and neuropathic pain from cervical/thoracic pain. Patient reported mild SOB, numbness/tingling (baseline). Patient denied nausea/vomiting. PCA set up upon arrival. Patient self medicating. MLI covered with Island dressing, old serosang drainage outlined with marker; penrose drain at base of incision. LLQ ileostomy; patient able to self empty; transitioning from watery green to brown over shift. Abdomen soft, tender. Patient tolerating clear liquid diet. Patient stated some items need to be thickened to tolerate. + void, yellow urine noted. + flatus. LBM 01/21/2021. POC discussed. Questions answered. Bed locked/lowest position. Call light/personal belongings. PCA bolus button within reach. All needs met throughout shift.     2 RN skin check performed with Charge RN. Admission profile completed. Patient oriented to room, floor, routine, call light use.     During shift, multiple PIV and U/S guided IV approaches. APRN approached this AM during shift change for Midline order d/t poor vascularity in BUE. Order received/placed by oncoming RN.     "

## 2021-01-22 VITALS
DIASTOLIC BLOOD PRESSURE: 72 MMHG | BODY MASS INDEX: 21.67 KG/M2 | OXYGEN SATURATION: 96 % | WEIGHT: 160 LBS | TEMPERATURE: 98.1 F | HEART RATE: 82 BPM | SYSTOLIC BLOOD PRESSURE: 151 MMHG | RESPIRATION RATE: 17 BRPM | HEIGHT: 72 IN

## 2021-01-22 LAB
ALBUMIN SERPL BCP-MCNC: 3.5 G/DL (ref 3.2–4.9)
ALBUMIN/GLOB SERPL: 1.3 G/DL
ALP SERPL-CCNC: 99 U/L (ref 30–99)
ALT SERPL-CCNC: 37 U/L (ref 2–50)
ANION GAP SERPL CALC-SCNC: 7 MMOL/L (ref 7–16)
AST SERPL-CCNC: 35 U/L (ref 12–45)
BASOPHILS # BLD AUTO: 0.5 % (ref 0–1.8)
BASOPHILS # BLD: 0.04 K/UL (ref 0–0.12)
BILIRUB SERPL-MCNC: 0.5 MG/DL (ref 0.1–1.5)
BUN SERPL-MCNC: 13 MG/DL (ref 8–22)
CALCIUM SERPL-MCNC: 8.8 MG/DL (ref 8.5–10.5)
CHLORIDE SERPL-SCNC: 101 MMOL/L (ref 96–112)
CO2 SERPL-SCNC: 25 MMOL/L (ref 20–33)
CREAT SERPL-MCNC: 0.74 MG/DL (ref 0.5–1.4)
EOSINOPHIL # BLD AUTO: 0.08 K/UL (ref 0–0.51)
EOSINOPHIL NFR BLD: 1 % (ref 0–6.9)
ERYTHROCYTE [DISTWIDTH] IN BLOOD BY AUTOMATED COUNT: 47.4 FL (ref 35.9–50)
GLOBULIN SER CALC-MCNC: 2.6 G/DL (ref 1.9–3.5)
GLUCOSE SERPL-MCNC: 81 MG/DL (ref 65–99)
HCT VFR BLD AUTO: 34.1 % (ref 37–47)
HGB BLD-MCNC: 10.5 G/DL (ref 12–16)
IMM GRANULOCYTES # BLD AUTO: 0.04 K/UL (ref 0–0.11)
IMM GRANULOCYTES NFR BLD AUTO: 0.5 % (ref 0–0.9)
LYMPHOCYTES # BLD AUTO: 1.56 K/UL (ref 1–4.8)
LYMPHOCYTES NFR BLD: 19.1 % (ref 22–41)
MCH RBC QN AUTO: 30.7 PG (ref 27–33)
MCHC RBC AUTO-ENTMCNC: 30.8 G/DL (ref 33.6–35)
MCV RBC AUTO: 99.7 FL (ref 81.4–97.8)
MONOCYTES # BLD AUTO: 0.8 K/UL (ref 0–0.85)
MONOCYTES NFR BLD AUTO: 9.8 % (ref 0–13.4)
NEUTROPHILS # BLD AUTO: 5.64 K/UL (ref 2–7.15)
NEUTROPHILS NFR BLD: 69.1 % (ref 44–72)
NRBC # BLD AUTO: 0 K/UL
NRBC BLD-RTO: 0 /100 WBC
PLATELET # BLD AUTO: 161 K/UL (ref 164–446)
PMV BLD AUTO: 11.5 FL (ref 9–12.9)
POTASSIUM SERPL-SCNC: 4.1 MMOL/L (ref 3.6–5.5)
PROT SERPL-MCNC: 6.1 G/DL (ref 6–8.2)
RBC # BLD AUTO: 3.42 M/UL (ref 4.2–5.4)
SODIUM SERPL-SCNC: 133 MMOL/L (ref 135–145)
WBC # BLD AUTO: 8.2 K/UL (ref 4.8–10.8)

## 2021-01-22 PROCEDURE — 700105 HCHG RX REV CODE 258: Performed by: PHYSICIAN ASSISTANT

## 2021-01-22 PROCEDURE — 85025 COMPLETE CBC W/AUTO DIFF WBC: CPT

## 2021-01-22 PROCEDURE — 700111 HCHG RX REV CODE 636 W/ 250 OVERRIDE (IP): Performed by: PHYSICIAN ASSISTANT

## 2021-01-22 PROCEDURE — 700102 HCHG RX REV CODE 250 W/ 637 OVERRIDE(OP): Performed by: PHYSICIAN ASSISTANT

## 2021-01-22 PROCEDURE — 700101 HCHG RX REV CODE 250: Performed by: PHYSICIAN ASSISTANT

## 2021-01-22 PROCEDURE — A9270 NON-COVERED ITEM OR SERVICE: HCPCS | Performed by: PHYSICIAN ASSISTANT

## 2021-01-22 PROCEDURE — 80053 COMPREHEN METABOLIC PANEL: CPT

## 2021-01-22 RX ORDER — SUMATRIPTAN 50 MG/1
100 TABLET, FILM COATED ORAL ONCE
Status: COMPLETED | OUTPATIENT
Start: 2021-01-22 | End: 2021-01-22

## 2021-01-22 RX ADMIN — Medication: at 09:59

## 2021-01-22 RX ADMIN — ENOXAPARIN SODIUM 40 MG: 40 INJECTION SUBCUTANEOUS at 06:00

## 2021-01-22 RX ADMIN — SUMATRIPTAN SUCCINATE 100 MG: 50 TABLET ORAL at 15:59

## 2021-01-22 RX ADMIN — METOPROLOL TARTRATE 50 MG: 50 TABLET, FILM COATED ORAL at 05:18

## 2021-01-22 RX ADMIN — POTASSIUM CHLORIDE AND SODIUM CHLORIDE: 900; 150 INJECTION, SOLUTION INTRAVENOUS at 02:12

## 2021-01-22 RX ADMIN — ONDANSETRON 4 MG: 2 INJECTION INTRAMUSCULAR; INTRAVENOUS at 08:46

## 2021-01-22 RX ADMIN — DIAZEPAM 5 MG: 5 TABLET ORAL at 02:09

## 2021-01-22 RX ADMIN — ACETAMINOPHEN 1000 MG: 500 TABLET ORAL at 02:09

## 2021-01-22 RX ADMIN — ACETAMINOPHEN 1000 MG: 500 TABLET ORAL at 08:46

## 2021-01-22 RX ADMIN — SPIRONOLACTONE 50 MG: 50 TABLET ORAL at 05:18

## 2021-01-22 RX ADMIN — POTASSIUM CHLORIDE AND SODIUM CHLORIDE: 900; 150 INJECTION, SOLUTION INTRAVENOUS at 14:55

## 2021-01-22 RX ADMIN — ONDANSETRON 4 MG: 2 INJECTION INTRAMUSCULAR; INTRAVENOUS at 13:01

## 2021-01-22 ASSESSMENT — ENCOUNTER SYMPTOMS
COUGH: 0
HEARTBURN: 0
ABDOMINAL PAIN: 1
VOMITING: 0
CHILLS: 0
NAUSEA: 0
PALPITATIONS: 0
SHORTNESS OF BREATH: 0
FEVER: 0

## 2021-01-22 ASSESSMENT — PAIN DESCRIPTION - PAIN TYPE
TYPE: ACUTE PAIN
TYPE: ACUTE PAIN

## 2021-01-22 NOTE — PROGRESS NOTES
Surgical Progress Note    Author: Concepción Rondon P.A.-C. Date & Time created: 2021   7:00 AM     Interval Events:    POD#2 Exploratory laparotomy, adhesiolysis, revision of ileostomy with release of prior graft of parastomal hernia repair. Tolerating FLD without nausea/vomiting. Admits to typical incisional abdominal pain, controlled with dilaudid PCA. Patient is feeling much better from pain standpoint.     It was discussed previously that the patient will continue the PCA until discharge as she is opioid dependent and takes pain medication at home. She is anxious and does not feel comfortable relying on bedside RN to bring oral pain meds regularly.  Pt is ambulating some and she is voiding.     : Patient is lying in bed comfortably. Pain has mostly resolved. She is tolerating her diet without N/V. She feels wonderful and is interested in going home. VSS. Labs pending.     Review of Systems   Constitutional: Negative for chills and fever.   Respiratory: Negative for cough and shortness of breath.    Cardiovascular: Negative for chest pain and palpitations.   Gastrointestinal: Positive for abdominal pain (incisional). Negative for heartburn, nausea and vomiting.        LLQ ileostomy with some loose output and reports gas filling appliance last night.   Genitourinary: Negative for dysuria.     Hemodynamics:  Temp (24hrs), Av.7 °C (98 °F), Min:36.4 °C (97.5 °F), Max:36.9 °C (98.4 °F)  Temperature: 36.9 °C (98.4 °F)  Pulse  Av.1  Min: 53  Max: 94   Blood Pressure : 115/59     Respiratory:    Respiration: 18, Pulse Oximetry: 94 %           Neuro:  GCS       Fluids:    Intake/Output Summary (Last 24 hours) at 2021 0700  Last data filed at 2021 1800  Gross per 24 hour   Intake 320 ml   Output 300 ml   Net 20 ml        Current Diet Order   Procedures   • Diet Order Diet: Full Liquid     Physical Exam  HENT:      Head: Normocephalic and atraumatic.      Mouth/Throat:      Pharynx: Oropharynx  is clear.   Eyes:      Conjunctiva/sclera: Conjunctivae normal.   Neck:      Musculoskeletal: Normal range of motion.   Cardiovascular:      Rate and Rhythm: Normal rate and regular rhythm.   Pulmonary:      Effort: Pulmonary effort is normal.   Abdominal:      General: There is no distension (mild).      Palpations: Abdomen is soft. There is no mass.      Tenderness: There is abdominal tenderness (incisional). There is no guarding or rebound.      Comments: Ileostomy with appliance in place, some loose stool.   Musculoskeletal: Normal range of motion.   Skin:     General: Skin is warm and dry.      Findings: No erythema or rash.      Comments: Midline Incision with staples and penrose drain, minimal serosanguinous ooze on dressing.   Neurological:      Mental Status: She is alert and oriented to person, place, and time.   Psychiatric:         Mood and Affect: Mood normal.       Labs:  Recent Results (from the past 24 hour(s))   CBC with Differential    Collection Time: 01/21/21 10:45 AM   Result Value Ref Range    WBC 11.5 (H) 4.8 - 10.8 K/uL    RBC 3.75 (L) 4.20 - 5.40 M/uL    Hemoglobin 11.8 (L) 12.0 - 16.0 g/dL    Hematocrit 36.4 (L) 37.0 - 47.0 %    MCV 97.1 81.4 - 97.8 fL    MCH 31.2 27.0 - 33.0 pg    MCHC 32.1 (L) 33.6 - 35.0 g/dL    RDW 45.1 35.9 - 50.0 fL    Platelet Count 179 164 - 446 K/uL    MPV 10.4 9.0 - 12.9 fL    Neutrophils-Polys 83.10 (H) 44.00 - 72.00 %    Lymphocytes 7.60 (L) 22.00 - 41.00 %    Monocytes 8.70 0.00 - 13.40 %    Eosinophils 0.00 0.00 - 6.90 %    Basophils 0.30 0.00 - 1.80 %    Immature Granulocytes 0.30 0.00 - 0.90 %    Nucleated RBC 0.00 /100 WBC    Neutrophils (Absolute) 9.58 (H) 2.00 - 7.15 K/uL    Lymphs (Absolute) 0.88 (L) 1.00 - 4.80 K/uL    Monos (Absolute) 1.00 (H) 0.00 - 0.85 K/uL    Eos (Absolute) 0.00 0.00 - 0.51 K/uL    Baso (Absolute) 0.03 0.00 - 0.12 K/uL    Immature Granulocytes (abs) 0.04 0.00 - 0.11 K/uL    NRBC (Absolute) 0.00 K/uL   Comp Metabolic Panel (CMP)     Collection Time: 01/21/21 10:45 AM   Result Value Ref Range    Sodium 136 135 - 145 mmol/L    Potassium 4.2 3.6 - 5.5 mmol/L    Chloride 102 96 - 112 mmol/L    Co2 24 20 - 33 mmol/L    Anion Gap 10.0 7.0 - 16.0    Glucose 127 (H) 65 - 99 mg/dL    Bun 15 8 - 22 mg/dL    Creatinine 0.89 0.50 - 1.40 mg/dL    Calcium 8.8 8.5 - 10.5 mg/dL    AST(SGOT) 21 12 - 45 U/L    ALT(SGPT) 27 2 - 50 U/L    Alkaline Phosphatase 105 (H) 30 - 99 U/L    Total Bilirubin 0.6 0.1 - 1.5 mg/dL    Albumin 3.6 3.2 - 4.9 g/dL    Total Protein 6.3 6.0 - 8.2 g/dL    Globulin 2.7 1.9 - 3.5 g/dL    A-G Ratio 1.3 g/dL   ESTIMATED GFR    Collection Time: 01/21/21 10:45 AM   Result Value Ref Range    GFR If African American >60 >60 mL/min/1.73 m 2    GFR If Non African American >60 >60 mL/min/1.73 m 2     Medical Decision Making, by Problem:  There are no active hospital problems to display for this patient.    Plan:    Pt is alert and oriented, NAD. Breathing unlabored. Tolerating PO.  Incision with staples and penrose. Keep dressing in place until POD2, reinforce if needed. VS stable. Labs pending. Encouraged ambulation and incentive spirometry. Ileostomy with some stool output. Pt reports minimal gas last night. Dilaudid PCA increased to 5mg lockout in 4 hours. Tolerating full liquids. Pt is ok for discharge home today if tolerating PO with good control of nausea, pain controlled, oxygen weaned, ambulating, and voiding. Also, please wait until morning labs have resulted prior to discharge. Thank you. Pt also seen and examined by Dr. Cruz.    Quality Measures:  Quality-Core Measures    Discussed patient condition with Patient and Dr. Cruz

## 2021-01-22 NOTE — DISCHARGE PLANNING
Anticipated Discharge Disposition:   Home    Action: This RN CM is following the case and the plan is to discharge Pt to home without psychosocial needs.     Barriers to Discharge:   Pending Medical Clearance    Plan:   Will continue to assist Pt with discharge as needed.

## 2021-01-22 NOTE — PROGRESS NOTES
"Entry time 0900 - Received report of patient at start of shift. Patient is AOx4. Patient complains of 7/10 abdomen/back pain. Dilaudid PCA bolus button in use, see MAR. PRN Zofran administered for complaints of nausea. Discharge orders received. Patient insistent that Dilaudid PCA not be discontinued until the \"moment of discharge.\" Patient states she has been in the hospital many times and \"this is always how she does it\". Patient states that she does not experience withdrawal effects from dilaudid and will \"transition easily\" to oxycodone at home. Discussed with CORWIN Beebe, PCA continued.    Entry time 1345 - Patient reporting increasing nausea/vomiting and migraine headache. Patient also increasingly anxious. Patient requesting home medication of naratriptan 2.5mg for migraine. Discussed with pharmacist, Gia. Per Gia, hospital does not carry naratriptan 2.5mg and sumatriptan 100mg is equivalent. CORWIN Beebe made aware, received new order for sumatriptan 100mg x1.  "

## 2021-01-23 NOTE — PROGRESS NOTES
Discharging patient home per provider order. Discharged with .  Patient non-receptive to discharge instructions.  Ambulating with standby assistance, voiding without difficulty, tolerating oral medications, oxygen saturation greater than 90%.  Educational handouts given and discussed.  All questions addressed. Belongings with patient at time of discharge. Patient transported to hospital exit via wheelchair by CNA.

## 2021-01-23 NOTE — DISCHARGE INSTRUCTIONS
Exploratory Laparotomy, Adult, Care After  This sheet gives you information about how to care for yourself after your procedure. Your health care provider may also give you more specific instructions. If you have problems or questions, contact your health care provider.  What can I expect after the procedure?  After the procedure, it is common to have:  · Abdominal soreness.  · Fatigue.  · A sore throat from the tube in your throat.  · A lack of appetite.  Follow these instructions at home:  Medicines  · Take over-the-counter and prescription medicines only as told by your health care provider.  · If you were prescribed an antibiotic medicine, take it as told by your health care provider. Do not stop taking the antibiotic even if you start to feel better.  · Do not drive or operate heavy machinery while taking pain medicine.  · If you are taking prescription pain medicine, take actions to prevent or treat constipation. Your health care provider may recommend that you:  ? Drink enough fluid to keep your urine pale yellow.  ? Eat foods that are high in fiber, such as fresh fruits and vegetables, whole grains, and beans.  ? Limit foods that are high in fat and processed sugars, such as fried or sweet foods.  ? Take an over-the-counter or prescription medicine for constipation. Undergoing surgery and taking pain medicines can make constipation worse.  Incision care    · Follow instructions from your health care provider about how to take care of your incision. Make sure you:  ? Wash your hands with soap and water before you change your bandage (dressing). If soap and water are not available, use hand .  ? Change your dressing as told by your health care provider.  ? Leave stitches (sutures), skin glue, or adhesive strips in place. These skin closures may need to stay in place for 2 weeks or longer. If adhesive strip edges start to loosen and curl up, you may trim the loose edges. Do not remove adhesive strips  completely unless your health care provider tells you to do that.  · If you were sent home with a drain, follow instructions from your health care provider about how to care for it.  · Check your incision area every day for signs of infection. Check for:  ? Redness, swelling, or pain.  ? Fluid or blood.  ? Warmth.  ? Pus or a bad smell.  Activity    · Rest as told by your health care provider.  ? Avoid sitting for a long time without moving. Get up to take short walks every 1-2 hours. This is important to improve blood flow and breathing. Ask for help if you feel weak or unsteady.  · Do not lift anything that is heavier than 5 lb (2.2 kg), or the limit that your health care provider tells you, until he or she says that it is safe.  · Ask your health care provider when you can start to do your usual activities again, such as driving, going back to work, and having sex.  Eating and drinking  · You may eat what you usually eat. Include lots of whole grains, fruits, and vegetables in your diet. This will help to prevent constipation.  · Drink enough fluid to keep your urine pale yellow.  Bathing  · Keep your incision clean and dry. Clean it as often as told by your health care provider:  ? Gently wash the incision with soap and water.  ? Rinse the incision with water to remove all soap.  ? Pat the incision dry with a clean towel. Do not rub the incision.  · You may take showers after 48 hours.  · Do not take baths, swim, or use a hot tub until your health care provider says it is okay to do so.  General instructions  · Do not use any products that contain nicotine or tobacco, such as cigarettes and e-cigarettes. These can delay healing after surgery. If you need help quitting, ask your health care provider.  · Wear compression stockings as told by your health care provider. These stockings help to prevent blood clots and reduce swelling in your legs.  · Keep all follow-up visits as told by your health care provider.  This is important.  Contact a health care provider if:  · You have a fever.  · You have chills.  · Your pain medicine is not helping.  · You have constipation or diarrhea.  · You have nausea or vomiting.  · You have drainage, redness, swelling, or pain at your incision site.  Get help right away if:  · Your pain is getting worse.  · You have not had a bowel movement for more than 3 days.  · You have ongoing (persistent) vomiting.  · The edges of your incision open up.  · You have warmth, tenderness, and swelling in your calf.  · You have trouble breathing.  · You have chest pain.  These symptoms may represent a serious problem that is an emergency. Do not wait to see if the symptoms will go away. Get medical help right away. Call your local emergency services (911 in the [place]). Do not drive yourself to the hospital.  Summary  · Abdominal soreness is common after exploratory laparotomy. Take over-the-counter and prescription pain medicines only as told by your health care provider.  · Follow instructions from your health care provider about how to take care of your incision. Do not take baths, swim, or use a hot tub until your health care provider says it is okay to do so.  · Watch for signs and symptoms of infection after surgery, including fever, chills, drainage from your incision, and worsening abdominal pain.  This information is not intended to replace advice given to you by your health care provider. Make sure you discuss any questions you have with your health care provider.  Document Released: 08/01/2005 Document Revised: 02/10/2020 Document Reviewed: 12/28/2018  Elsevier Patient Education © 2020 [employer]      Laparoscopic Lysis of Abdominal Adhesions  Laparoscopic lysis of abdominal adhesions is a surgical procedure to remove tough (fibrous) bands of tissue from the abdomen. Adhesions are like scars, but they form on the inside of the body. They are caused by inflammation and often develop  after a previous surgery or infection as part of the healing process. You may need this procedure if you have abdominal adhesions that are causing pain or other symptoms.  During the procedure, the surgeon inserts a thin tube that has a light and camera on the end of it (laparoscope) into the abdomen. The camera sends images to a screen in the operating room, and these images are used to help guide the surgery. This type of surgery is done through small incisions, rather than one large incision.  Tell a health care provider about:  · Any allergies you have.  · All medicines you are taking, including vitamins, herbs, eye drops, creams, and over-the-counter medicines.  · Any problems you or family members have had with anesthetic medicines.  · Any blood disorders you have.  · Any surgeries you have had.  · Any medical conditions you have.  · Whether you are pregnant or may be pregnant.  What are the risks?  Generally, this is a safe procedure. However, problems may occur, including:  · Infection.  · Bleeding.  · Allergic reactions to medicines.  · Damage to abdominal organs.  · Development of more abdominal adhesions.  What happens before the procedure?  Staying hydrated  Follow instructions from your health care provider about hydration, which may include:  · Up to 2 hours before the procedure - you may continue to drink clear liquids, such as water, clear fruit juice, black coffee, and plain tea.  Eating and drinking restrictions  Follow instructions from your health care provider about eating and drinking, which may include:  · 8 hours before the procedure - stop eating heavy meals or foods such as meat, fried foods, or fatty foods.  · 6 hours before the procedure - stop eating light meals or foods, such as toast or cereal.  · 6 hours before the procedure - stop drinking milk or drinks that contain milk.  · 2 hours before the procedure - stop drinking clear liquids.  Medicines  Ask your health care provider  about:  · Changing or stopping your regular medicines. This is especially important if you are taking diabetes medicines or blood thinners.  · Taking medicines such as aspirin and ibuprofen. These medicines can thin your blood. Do not take these medicines unless your health care provider tells you to take them.  · Taking over-the-counter medicines, vitamins, herbs, and supplements.  General instructions  · Plan to have someone take you home from the hospital or clinic.  · Plan to have a responsible adult care for you for at least 24 hours after you leave the hospital or clinic. This is important.  · Ask your health care provider how your surgical site will be marked or identified.  · You may be asked to shower with a germ-killing soap.  · You may have imaging tests and blood tests.  · Ask your health care provider what steps will be taken to help prevent infection. These may include:  ? Removing hair at the surgery site.  ? Washing skin with a germ-killing soap.  ? Antibiotic medicine.  What happens during the procedure?    · An IV will be inserted into one of your veins.  · You will be given a medicine to make you fall asleep (general anesthetic).  · A tube may be passed through your nose and into your stomach (nasogastric tube).  · The surgeon will make a small incision through the wall of your abdomen.  · Your abdomen will be filled with a gas. This will help your surgeon see the inside of your abdomen more clearly. It will also give the surgeon more room to operate.  · A laparoscope with a camera on the end will be passed through the incision. It will send images to a monitor in the operating room.  · Other small incisions may be made in your abdomen. Long, thin surgical instruments will be inserted through them as needed.  · The adhesions will be cut with surgical scissors or removed with some form of energy, such as electric current or laser energy.  · The incisions will be closed with adhesive strips or  stitches (sutures).  · A bandage (dressing) will be placed over the incisions.  The procedure may vary among health care providers and hospitals.  What happens after the procedure?  · Your blood pressure, heart rate, breathing rate, and blood oxygen level will be monitored until you leave the hospital or clinic.  · You may have some pain. You will get pain medicine as needed.  · You will be encouraged to get up and walk around while you are still in the hospital.  · If you have a nasogastric tube, it will be removed when your bowel function returns. After that, you will be allowed to eat or drink as usual.  · When you are taking fluids well, your IV will be removed.  Summary  · Laparoscopic lysis of abdominal adhesions is a surgical procedure to remove scar tissue (adhesions) from inside your body.  · You may need this procedure if you have abdominal adhesions that are causing pain or other symptoms.  · After surgery, you will receive medicines and fluid by vein until your bowel function returns to normal. After that, you will be allowed to eat or drink as usual.  This information is not intended to replace advice given to you by your health care provider. Make sure you discuss any questions you have with your health care provider.  Document Released: 03/16/2010 Document Revised: 12/05/2018 Document Reviewed: 12/05/2018  [employer] Patient Education © 2020 [employer]      Discharge Instructions    Discharged to home by car with relative. Discharged via wheelchair, hospital escort: Yes.  Special equipment needed: Not Applicable    Be sure to schedule a follow-up appointment with your primary care doctor or any specialists as instructed.     Discharge Plan:   Diet Plan: Discussed  Activity Level: Discussed  Confirmed Follow up Appointment: Patient to Call and Schedule Appointment  Confirmed Symptoms Management: Discussed  Medication Reconciliation Updated: Yes  Influenza Vaccine Indication: Not indicated: Previously  immunized this influenza season and > 8 years of age    I understand that a diet low in cholesterol, fat, and sodium is recommended for good health. Unless I have been given specific instructions below for another diet, I accept this instruction as my diet prescription.   Other diet: Full liquid. Advance as tolerated.    Special Instructions: None    · Is patient discharged on Warfarin / Coumadin?   No     Depression / Suicide Risk    As you are discharged from this [facility] facility, it is important to learn how to keep safe from harming yourself.    Recognize the warning signs:  · Abrupt changes in personality, positive or negative- including increase in energy   · Giving away possessions  · Change in eating patterns- significant weight changes-  positive or negative  · Change in sleeping patterns- unable to sleep or sleeping all the time   · Unwillingness or inability to communicate  · Depression  · Unusual sadness, discouragement and loneliness  · Talk of wanting to die  · Neglect of personal appearance   · Rebelliousness- reckless behavior  · Withdrawal from people/activities they love  · Confusion- inability to concentrate     If you or a loved one observes any of these behaviors or has concerns about self-harm, here's what you can do:  · Talk about it- your feelings and reasons for harming yourself  · Remove any means that you might use to hurt yourself (examples: pills, rope, extension cords, firearm)  · Get professional help from the community (Mental Health, Substance Abuse, psychological counseling)  · Do not be alone:Call your Safe Contact- someone whom you trust who will be there for you.  · Call your local CRISIS HOTLINE 172-2473 or 165-204-3493  · Call your local Children's Mobile Crisis Response Team [place] (397) 719-6999 or www.bMenu  · Call the toll free National Suicide Prevention Hotlines   · National Suicide Prevention Lifeline 931-764-UWTD (2003)  · National Hope Line  Network 800-SUICIDE (127-9097)

## 2021-01-23 NOTE — PROGRESS NOTES
"Patient requesting to stay additional night in hospital due to uncontrolled nausea. Patient also increasingly drowsy, irritable, anxious, and complains of severe headache. Discussed with Concepción OLIVIA the possibility that patient is experiencing side effects of Dilaudid PCA. Per Concepción, Dilaudid PCA to be discontinued and patient to be transitioned to home regime of oxycodone. Spoke with patient about discontinuation of Dilaudid PCA. Patient immediately became very upset and demanded to be unhooked from all equipment so she could go home. Patient did not allow for further discussion and requested RN to \"leave her alone.\" Informed CORWIN Beebe, who stated that PCA is to be discontinued and PRN oxycodone ordered. Per Concepción, patient may still discharge this evening if she desires. Rounded back on patient after allowing some time, attempted again to discuss discontinuation of PCA. Patient again immediately very upset, shaking finger in this RN's face, stating \"you all have done me wrong here, I am furious! I am leaving and will never come to this hospital again.\" Patient did not allow for further discussion of rationale behind discontinuation of Dilaudid PCA. PIV and midline IV removed. New gauze/hypafix dressing applied to abdominal incision.   "

## 2021-01-25 ENCOUNTER — HOSPITAL ENCOUNTER (OUTPATIENT)
Dept: LAB | Facility: MEDICAL CENTER | Age: 66
End: 2021-01-25
Attending: COLON & RECTAL SURGERY
Payer: MEDICARE

## 2021-01-25 LAB
ALBUMIN SERPL BCP-MCNC: 3.5 G/DL (ref 3.2–4.9)
ALBUMIN/GLOB SERPL: 1 G/DL
ALP SERPL-CCNC: 167 U/L (ref 30–99)
ALT SERPL-CCNC: 47 U/L (ref 2–50)
ANION GAP SERPL CALC-SCNC: 9 MMOL/L (ref 7–16)
AST SERPL-CCNC: 40 U/L (ref 12–45)
BASOPHILS # BLD AUTO: 0.3 % (ref 0–1.8)
BASOPHILS # BLD: 0.03 K/UL (ref 0–0.12)
BILIRUB SERPL-MCNC: 0.4 MG/DL (ref 0.1–1.5)
BUN SERPL-MCNC: 13 MG/DL (ref 8–22)
CALCIUM SERPL-MCNC: 9.7 MG/DL (ref 8.4–10.2)
CHLORIDE SERPL-SCNC: 101 MMOL/L (ref 96–112)
CO2 SERPL-SCNC: 28 MMOL/L (ref 20–33)
CREAT SERPL-MCNC: 0.81 MG/DL (ref 0.5–1.4)
EOSINOPHIL # BLD AUTO: 0.21 K/UL (ref 0–0.51)
EOSINOPHIL NFR BLD: 2.3 % (ref 0–6.9)
ERYTHROCYTE [DISTWIDTH] IN BLOOD BY AUTOMATED COUNT: 45 FL (ref 35.9–50)
FASTING STATUS PATIENT QL REPORTED: NORMAL
GLOBULIN SER CALC-MCNC: 3.6 G/DL (ref 1.9–3.5)
GLUCOSE SERPL-MCNC: 98 MG/DL (ref 65–99)
HCT VFR BLD AUTO: 37.4 % (ref 37–47)
HGB BLD-MCNC: 12 G/DL (ref 12–16)
IMM GRANULOCYTES # BLD AUTO: 0.04 K/UL (ref 0–0.11)
IMM GRANULOCYTES NFR BLD AUTO: 0.4 % (ref 0–0.9)
LYMPHOCYTES # BLD AUTO: 0.98 K/UL (ref 1–4.8)
LYMPHOCYTES NFR BLD: 10.6 % (ref 22–41)
MCH RBC QN AUTO: 31.2 PG (ref 27–33)
MCHC RBC AUTO-ENTMCNC: 32.1 G/DL (ref 33.6–35)
MCV RBC AUTO: 97.1 FL (ref 81.4–97.8)
MONOCYTES # BLD AUTO: 0.69 K/UL (ref 0–0.85)
MONOCYTES NFR BLD AUTO: 7.5 % (ref 0–13.4)
NEUTROPHILS # BLD AUTO: 7.31 K/UL (ref 2–7.15)
NEUTROPHILS NFR BLD: 78.9 % (ref 44–72)
NRBC # BLD AUTO: 0 K/UL
NRBC BLD-RTO: 0 /100 WBC
PLATELET # BLD AUTO: 215 K/UL (ref 164–446)
PMV BLD AUTO: 10 FL (ref 9–12.9)
POTASSIUM SERPL-SCNC: 4 MMOL/L (ref 3.6–5.5)
PROT SERPL-MCNC: 7.1 G/DL (ref 6–8.2)
RBC # BLD AUTO: 3.85 M/UL (ref 4.2–5.4)
SODIUM SERPL-SCNC: 138 MMOL/L (ref 135–145)
WBC # BLD AUTO: 9.3 K/UL (ref 4.8–10.8)

## 2021-01-25 PROCEDURE — 85025 COMPLETE CBC W/AUTO DIFF WBC: CPT

## 2021-01-25 PROCEDURE — 80053 COMPREHEN METABOLIC PANEL: CPT

## 2021-01-25 PROCEDURE — 36415 COLL VENOUS BLD VENIPUNCTURE: CPT

## 2021-01-26 ENCOUNTER — HOSPITAL ENCOUNTER (OUTPATIENT)
Dept: RADIOLOGY | Facility: MEDICAL CENTER | Age: 66
End: 2021-01-26
Attending: COLON & RECTAL SURGERY
Payer: MEDICARE

## 2021-01-26 DIAGNOSIS — R10.12 ABDOMINAL PAIN, LEFT UPPER QUADRANT: ICD-10-CM

## 2021-01-26 DIAGNOSIS — A08.11 EPIDEMIC VOMITING SYNDROME: ICD-10-CM

## 2021-01-26 PROCEDURE — 700117 HCHG RX CONTRAST REV CODE 255: Performed by: COLON & RECTAL SURGERY

## 2021-01-26 PROCEDURE — 74177 CT ABD & PELVIS W/CONTRAST: CPT

## 2021-01-26 RX ADMIN — IOHEXOL 25 ML: 240 INJECTION, SOLUTION INTRATHECAL; INTRAVASCULAR; INTRAVENOUS; ORAL at 16:04

## 2021-01-26 RX ADMIN — IOHEXOL 100 ML: 350 INJECTION, SOLUTION INTRAVENOUS at 16:03

## 2021-01-27 ENCOUNTER — APPOINTMENT (OUTPATIENT)
Dept: ADMISSIONS | Facility: MEDICAL CENTER | Age: 66
End: 2021-01-27
Payer: MEDICARE

## 2021-01-28 ENCOUNTER — HOSPITAL ENCOUNTER (OUTPATIENT)
Facility: MEDICAL CENTER | Age: 66
End: 2021-01-28
Attending: COLON & RECTAL SURGERY | Admitting: RADIOLOGY
Payer: MEDICARE

## 2021-01-28 ENCOUNTER — APPOINTMENT (OUTPATIENT)
Dept: RADIOLOGY | Facility: MEDICAL CENTER | Age: 66
End: 2021-01-28
Attending: COLON & RECTAL SURGERY
Payer: MEDICARE

## 2021-01-28 VITALS
SYSTOLIC BLOOD PRESSURE: 132 MMHG | DIASTOLIC BLOOD PRESSURE: 45 MMHG | TEMPERATURE: 97 F | RESPIRATION RATE: 16 BRPM | BODY MASS INDEX: 21.71 KG/M2 | HEART RATE: 76 BPM | WEIGHT: 160.27 LBS | OXYGEN SATURATION: 95 % | HEIGHT: 72 IN

## 2021-01-28 DIAGNOSIS — T81.43XA POSTOPERATIVE INTRA-ABDOMINAL ABSCESS: ICD-10-CM

## 2021-01-28 DIAGNOSIS — R10.12 ABDOMINAL PAIN, LEFT UPPER QUADRANT: ICD-10-CM

## 2021-01-28 DIAGNOSIS — R11.10 NON-INTRACTABLE VOMITING, PRESENCE OF NAUSEA NOT SPECIFIED, UNSPECIFIED VOMITING TYPE: ICD-10-CM

## 2021-01-28 DIAGNOSIS — R11.0 NAUSEA: ICD-10-CM

## 2021-01-28 LAB
APPEARANCE FLD: NORMAL
BODY FLD TYPE: NORMAL
COLOR FLD: NORMAL
CSF COMMENTS 1658: NORMAL
EOSINOPHIL NFR FLD: 34 %
INR PPP: 0.99 (ref 0.87–1.13)
LYMPHOCYTES NFR FLD: 4 %
MONONUC CELLS NFR FLD: 9 %
NEUTROPHILS NFR FLD: 53 %
PROTHROMBIN TIME: 13.4 SEC (ref 12–14.6)
RBC # FLD: NORMAL CELLS/UL
WBC # FLD: 8320 CELLS/UL

## 2021-01-28 PROCEDURE — 700102 HCHG RX REV CODE 250 W/ 637 OVERRIDE(OP)

## 2021-01-28 PROCEDURE — A9270 NON-COVERED ITEM OR SERVICE: HCPCS

## 2021-01-28 PROCEDURE — 87015 SPECIMEN INFECT AGNT CONCNTJ: CPT

## 2021-01-28 PROCEDURE — 89051 BODY FLUID CELL COUNT: CPT

## 2021-01-28 PROCEDURE — 700105 HCHG RX REV CODE 258: Performed by: RADIOLOGY

## 2021-01-28 PROCEDURE — 160002 HCHG RECOVERY MINUTES (STAT)

## 2021-01-28 PROCEDURE — 85610 PROTHROMBIN TIME: CPT

## 2021-01-28 PROCEDURE — 700111 HCHG RX REV CODE 636 W/ 250 OVERRIDE (IP): Performed by: RADIOLOGY

## 2021-01-28 PROCEDURE — 87205 SMEAR GRAM STAIN: CPT

## 2021-01-28 PROCEDURE — 99153 MOD SED SAME PHYS/QHP EA: CPT

## 2021-01-28 PROCEDURE — 87070 CULTURE OTHR SPECIMN AEROBIC: CPT

## 2021-01-28 PROCEDURE — 700111 HCHG RX REV CODE 636 W/ 250 OVERRIDE (IP)

## 2021-01-28 RX ORDER — MIDAZOLAM HYDROCHLORIDE 1 MG/ML
INJECTION INTRAMUSCULAR; INTRAVENOUS
Status: COMPLETED
Start: 2021-01-28 | End: 2021-01-28

## 2021-01-28 RX ORDER — SODIUM CHLORIDE 9 MG/ML
INJECTION, SOLUTION INTRAVENOUS
Status: DISCONTINUED | OUTPATIENT
Start: 2021-01-28 | End: 2021-01-28 | Stop reason: HOSPADM

## 2021-01-28 RX ORDER — MIDAZOLAM HYDROCHLORIDE 1 MG/ML
.5-2 INJECTION INTRAMUSCULAR; INTRAVENOUS PRN
Status: DISCONTINUED | OUTPATIENT
Start: 2021-01-28 | End: 2021-01-28 | Stop reason: HOSPADM

## 2021-01-28 RX ORDER — OXYCODONE HYDROCHLORIDE 10 MG/1
10 TABLET ORAL ONCE
Status: COMPLETED | OUTPATIENT
Start: 2021-01-28 | End: 2021-01-28

## 2021-01-28 RX ORDER — OXYCODONE HYDROCHLORIDE 5 MG/1
TABLET ORAL
Status: COMPLETED
Start: 2021-01-28 | End: 2021-01-28

## 2021-01-28 RX ORDER — SODIUM CHLORIDE 9 MG/ML
500 INJECTION, SOLUTION INTRAVENOUS
Status: DISCONTINUED | OUTPATIENT
Start: 2021-01-28 | End: 2021-01-28 | Stop reason: HOSPADM

## 2021-01-28 RX ORDER — ONDANSETRON 2 MG/ML
4 INJECTION INTRAMUSCULAR; INTRAVENOUS PRN
Status: DISCONTINUED | OUTPATIENT
Start: 2021-01-28 | End: 2021-01-28 | Stop reason: HOSPADM

## 2021-01-28 RX ORDER — NALOXONE HYDROCHLORIDE 0.4 MG/ML
INJECTION, SOLUTION INTRAMUSCULAR; INTRAVENOUS; SUBCUTANEOUS
Status: COMPLETED
Start: 2021-01-28 | End: 2021-01-28

## 2021-01-28 RX ADMIN — MIDAZOLAM HYDROCHLORIDE 1 MG: 1 INJECTION, SOLUTION INTRAMUSCULAR; INTRAVENOUS at 14:32

## 2021-01-28 RX ADMIN — FENTANYL CITRATE 25 MCG: 50 INJECTION, SOLUTION INTRAMUSCULAR; INTRAVENOUS at 14:33

## 2021-01-28 RX ADMIN — FENTANYL CITRATE 25 MCG: 50 INJECTION, SOLUTION INTRAMUSCULAR; INTRAVENOUS at 14:35

## 2021-01-28 RX ADMIN — MIDAZOLAM HYDROCHLORIDE 2 MG: 1 INJECTION, SOLUTION INTRAMUSCULAR; INTRAVENOUS at 14:04

## 2021-01-28 RX ADMIN — OXYCODONE HYDROCHLORIDE 10 MG: 10 TABLET ORAL at 11:58

## 2021-01-28 RX ADMIN — FENTANYL CITRATE 25 MCG: 50 INJECTION, SOLUTION INTRAMUSCULAR; INTRAVENOUS at 14:29

## 2021-01-28 RX ADMIN — FENTANYL CITRATE 50 MCG: 50 INJECTION, SOLUTION INTRAMUSCULAR; INTRAVENOUS at 14:04

## 2021-01-28 RX ADMIN — SODIUM CHLORIDE: 9 INJECTION, SOLUTION INTRAVENOUS at 11:36

## 2021-01-28 RX ADMIN — FENTANYL CITRATE 25 MCG: 50 INJECTION, SOLUTION INTRAMUSCULAR; INTRAVENOUS at 14:31

## 2021-01-28 RX ADMIN — MIDAZOLAM HYDROCHLORIDE 1 MG: 1 INJECTION, SOLUTION INTRAMUSCULAR; INTRAVENOUS at 14:28

## 2021-01-28 RX ADMIN — MIDAZOLAM HYDROCHLORIDE 1 MG: 1 INJECTION, SOLUTION INTRAMUSCULAR; INTRAVENOUS at 14:35

## 2021-01-28 RX ADMIN — MIDAZOLAM HYDROCHLORIDE 1 MG: 1 INJECTION, SOLUTION INTRAMUSCULAR; INTRAVENOUS at 14:13

## 2021-01-28 RX ADMIN — FENTANYL CITRATE 50 MCG: 50 INJECTION, SOLUTION INTRAMUSCULAR; INTRAVENOUS at 14:12

## 2021-01-28 RX ADMIN — OXYCODONE 10 MG: 5 TABLET ORAL at 11:58

## 2021-01-28 ASSESSMENT — FIBROSIS 4 INDEX: FIB4 SCORE: 1.82

## 2021-01-28 NOTE — PROGRESS NOTES
"IR RN note:     Aspiration of left sided fluid collection with drain placement to LLQ by MD Del Toro  Pre procedure, patient very agitated and upset due to pain and infiltrated IV, stating that she has had \"20 IV sticks in the past day, 6 by the nurses upstairs. That's inhumane.\" Stated \"shame on [facility] for doing this to me.\" CAMERON Coughlin and this RN informed patient that she did not have to go through with the procedure, to which patient responded \"I have no choice. Its either this or death.\" Patient ultimately consented to procedure.       Sedation given per provider direction. Patient appeared to tolerate procedure, patient awake and talking post procedure.    Report given to CAMERON Veras. Pt transported to [facility] via IR RN, then transferred care.    Flexima 8fr 25cm REF: [id number] LOT: [id number] EXP: [id number]    40 ml sample sent to lab    "

## 2021-01-28 NOTE — DISCHARGE INSTRUCTIONS
ACTIVITY: Rest and take it easy for the first 24 hours.  A responsible adult is recommended to remain with you during that time.  It is normal to feel sleepy.  We encourage you to not do anything that requires balance, judgment or coordination.    MILD FLU-LIKE SYMPTOMS ARE NORMAL. YOU MAY EXPERIENCE GENERALIZED MUSCLE ACHES, THROAT IRRITATION, HEADACHE AND/OR SOME NAUSEA.    FOR 24 HOURS DO NOT:  Drive, operate machinery or run household appliances.  Drink beer or alcoholic beverages.   Make important decisions or sign legal documents.    SPECIAL INSTRUCTIONS: follow up with your primary care physician as needed.    DIET: To avoid nausea, slowly advance diet as tolerated, avoiding spicy or greasy foods for the first day.  Add more substantial food to your diet according to your physician's instructions.  Babies can be fed formula or breast milk as soon as they are hungry.  INCREASE FLUIDS AND FIBER TO AVOID CONSTIPATION.    SURGICAL DRESSING/BATHING: keep dressing clean dry intact.     FOLLOW-UP APPOINTMENT:  A follow-up appointment should be arranged with your doctor in [phone]; call to schedule.    You should CALL YOUR PHYSICIAN if you develop:  Fever greater than 101 degrees F.  Pain not relieved by medication, or persistent nausea or vomiting.  Excessive bleeding (blood soaking through dressing) or unexpected drainage from the wound.  Extreme redness or swelling around the incision site, drainage of pus or foul smelling drainage.  Inability to urinate or empty your bladder within 8 hours.  Problems with breathing or chest pain.    You should call 911 if you develop problems with breathing or chest pain.  If you are unable to contact your doctor or surgical center, you should go to the nearest emergency room or urgent care center.  Physician's telephone #: [phone]    If any questions arise, call your doctor.  If your doctor is not available, please feel free to call the Surgical Center at [phone]. The  Contact Center is open Monday through Friday 7AM to 5PM and may speak to a nurse at [phone], or toll free at [phone].     A registered nurse may call you a few days after your surgery to see how you are doing after your procedure.    MEDICATIONS: Resume taking daily medication.  Take prescribed pain medication with food.  If no medication is prescribed, you may take non-aspirin pain medication if needed.  PAIN MEDICATION CAN BE VERY CONSTIPATING.  Take a stool softener or laxative such as senokot, pericolace, or milk of magnesia if needed.    If your physician has prescribed pain medication that includes Acetaminophen (Tylenol), do not take additional Acetaminophen (Tylenol) while taking the prescribed medication.    Depression / Suicide Risk    As you are discharged from this [facility] facility, it is important to learn how to keep safe from harming yourself.    Recognize the warning signs:  · Abrupt changes in personality, positive or negative- including increase in energy   · Giving away possessions  · Change in eating patterns- significant weight changes-  positive or negative  · Change in sleeping patterns- unable to sleep or sleeping all the time   · Unwillingness or inability to communicate  · Depression  · Unusual sadness, discouragement and loneliness  · Talk of wanting to die  · Neglect of personal appearance   · Rebelliousness- reckless behavior  · Withdrawal from people/activities they love  · Confusion- inability to concentrate     If you or a loved one observes any of these behaviors or has concerns about self-harm, here's what you can do:  · Talk about it- your feelings and reasons for harming yourself  · Remove any means that you might use to hurt yourself (examples: pills, rope, extension cords, firearm)  · Get professional help from the community (Mental Health, Substance Abuse, psychological counseling)  · Do not be alone:Call your Safe Contact- someone whom you trust who will be  there for you.  · Call your local CRISIS HOTLINE 865-2453 or 621-113-1146  · Call your local Children's Mobile Crisis Response Team [place] (475) 607-2128 or www.[employer]  · Call the toll free National Suicide Prevention Hotlines   · National Suicide Prevention Lifeline 182-948-DQKI (6195)  · National Hope Line Network 800-SUICIDE (756-2154)  ·   · Discharge Instructions: Caring for Your Dinh-Acuña Drainage Tube  · Your healthcare provider discharged you with a Dinh-Acuña drainage tube. They commonly leave this drain within the abdomen and other cavities after surgery. It helps drain and collect blood and body fluid after surgery. This can prevent swelling and reduces the risk for infection. The tube is held in place by a few stitches. It's covered with a bandage. Your healthcare provider will remove the drain when he or she determines you no longer need it.  ·   · Home care  · Don’t sleep on the same side as the tube.  · Secure the tube and bag inside your clothing with a safety pin. This helps keep the tube from being pulled out.  · Empty your drain at least twice a day. Empty it more often if the drain is full. Wash  and dry your hands before emptying the drain.  · Lift the opening on the drain.  · Drain the fluid into a measuring cup.  · Record the amount of fluid each time you empty the drain. Include the date and time it was emptied. Share this information with your healthcare provider on your next visit.  · Squeeze the bulb with your hands until you hear air coming out of the bulb if your healthcare provider has instructed you to do so (sometimes the bulb is used as a reservoir without suction). Check with your healthcare provider about specific drain instructions.  · Close the opening.  · If you are to change the dressing around the tube, follow the directions your provider has given you. Some dressings may not need to be changed, but your provider will let you know. Here are some general  steps to follow:  · Wash your hands.  · Remove the old bandage.  · Wash your hands again.  · Clean the skin around the incision and tube site as instructed.  · Put a new bandage on the incision and tube site. Make the bandage large enough to cover the whole incision area.  · Tape the bandage in place.  · Talk with your healthcare provider about showering with the drain. You may need to keep the bandage and tube site dry when you shower. Ask your healthcare team about the best way to do this.  · “Stripping” the tube helps keep blood clots from blocking the tube. Ask your healthcare team how often you should strip the tube. Stripping may not be needed, depending on where and why your healthcare provider placed the tube. It may even be dangerous in some cases.   · Hold the tubing where it leaves the skin, with one hand. This keeps it from pulling on the skin.  · Pinch the tubing with the thumb and first finger of your other hand.  · Slowly and firmly pull your thumb and first finger down the tubing. You may find it helpful to hold an alcohol swab between your fingers and the tube to lubricate the tubing.  · If the pulling hurts or feels like the tube is coming out of the skin, stop. Begin again more gently.  ·   · Follow-up care  · Make a follow-up appointment as directed by our staff.  ·   ·   · When to call your healthcare provider  · Call your healthcare provider right away if you have any of the following:  ·   · New or increased pain around the tube  · Redness, swelling, or warmth around the incision or tube  · Drainage that is foul-smelling  · Vomiting  · Fever of 100.4°F ( 38°C) or higher, or as directed by your provider  · Fluid leaking around the tube  · Incision seems not to be healing  · Stitches become loose  · Tube falls out or breaks  · Drainage that changes from light pink to dark red  · Blood clots in the drainage bulb  · A sudden increase or decrease in the amount of drainage (over 30 mL)  ·   · ©  9664-8437 [employer]. All rights reserved. This information is not intended as a substitute for professional medical care. Always follow your healthcare professional's instructions.

## 2021-01-28 NOTE — OR SURGEON
Immediate Post- Operative Note        PostOp Diagnosis: ANTERIOR ABDOMINAL FLUID COLLECTION POST HERNIA REPAIR. HX CROHN DZ, COLECTOMY, OSTOMY X 20 YEARS      Procedure(s): CT GUIDED CATHETER DRAINAGE WITH PLACEMENT OF 8 [place] LOCKING LOOP CATHETER FROM RIGHT PARAMEDIAN APPROACH TO MIDLINE/LLQ  ABDOMINAL-PERITONEAL FLUID COLLECTION    EVACUATION: 45 ML SEROSANGUINOUS FLUID. NO PATRICIA PUS. NOT MALODOROUS.     8F LOCKING LOOP TO SUCTION BULB.     SPECIMEN SENT FOR C+S, GRAM, CELL COUNT.       Estimated Blood Loss: <1CC        Complications: NONE        1/28/2021     2:43 PM     Jonathan Del Toro M.D.

## 2021-01-28 NOTE — OR NURSING
COVID-19 Pre-Surgery Screenin. Do you have an undiagnosed respiratory illness or symptoms such as coughing, sneezing or sore throat? Any loss of sense of smell or taste? no          2. Do you have an unexplained fever greater than 100.4 degrees Fahrenheit or 38 degrees Celsius? no  ?  3. Have you had direct exposure to a patient who tested positive for Covid-19? no    4. Patient informed of current visitation and mask policies by this RN.

## 2021-01-29 LAB
GRAM STN SPEC: NORMAL
SIGNIFICANT IND 70042: NORMAL
SITE SITE: NORMAL
SOURCE SOURCE: NORMAL

## 2021-01-29 NOTE — OR NURSING
1511 Patient arrived from cath lab s/p fluid aspiration and rose drain placement to [facility]. rose drain with moderate bloody drainage. Patient awake, denies pain, denies nausea.  1530 Patient tolerating po intake.  1605 patient and family educated on how to empty rose drainage. Received verbal understanding.  1610 Criteria met to discharge patient home.  1614 Discharge instructions given to patient, patient verbalize understanding of the orders, reviewed activity, worsening symptoms, follow up, medications, dressing care.  1618 patient escorted via w/c with all her personal belongings.

## 2021-02-01 LAB
BACTERIA FLD AEROBE CULT: NORMAL
GRAM STN SPEC: NORMAL
SIGNIFICANT IND 70042: NORMAL
SITE SITE: NORMAL
SOURCE SOURCE: NORMAL

## 2021-02-09 ENCOUNTER — HOSPITAL ENCOUNTER (OUTPATIENT)
Dept: RADIOLOGY | Facility: MEDICAL CENTER | Age: 66
End: 2021-02-09
Attending: COLON & RECTAL SURGERY
Payer: MEDICARE

## 2021-02-09 DIAGNOSIS — Z48.89 AFTERCARE FOLLOWING SURGERY FOR INJURY AND TRAUMA: ICD-10-CM

## 2021-02-09 DIAGNOSIS — T81.43XS: ICD-10-CM

## 2021-02-09 DIAGNOSIS — R10.12 LEFT UPPER QUADRANT PAIN: ICD-10-CM

## 2021-02-09 DIAGNOSIS — K65.0 ACUTE PERITONITIS (HCC): ICD-10-CM

## 2021-02-09 PROCEDURE — 74177 CT ABD & PELVIS W/CONTRAST: CPT

## 2021-02-09 PROCEDURE — 700117 HCHG RX CONTRAST REV CODE 255: Performed by: COLON & RECTAL SURGERY

## 2021-02-09 RX ADMIN — IOHEXOL 25 ML: 240 INJECTION, SOLUTION INTRATHECAL; INTRAVASCULAR; INTRAVENOUS; ORAL at 14:42

## 2021-02-09 RX ADMIN — IOHEXOL 100 ML: 350 INJECTION, SOLUTION INTRAVENOUS at 15:01

## 2021-03-03 ENCOUNTER — APPOINTMENT (RX ONLY)
Dept: URBAN - METROPOLITAN AREA CLINIC 20 | Facility: CLINIC | Age: 66
Setting detail: DERMATOLOGY
End: 2021-03-03

## 2021-03-03 DIAGNOSIS — L81.4 OTHER MELANIN HYPERPIGMENTATION: ICD-10-CM

## 2021-03-03 DIAGNOSIS — Z71.89 OTHER SPECIFIED COUNSELING: ICD-10-CM

## 2021-03-03 DIAGNOSIS — Z85.828 PERSONAL HISTORY OF OTHER MALIGNANT NEOPLASM OF SKIN: ICD-10-CM

## 2021-03-03 DIAGNOSIS — Z23 NEED FOR VACCINATION: ICD-10-CM

## 2021-03-03 DIAGNOSIS — L57.0 ACTINIC KERATOSIS: ICD-10-CM

## 2021-03-03 DIAGNOSIS — L82.1 OTHER SEBORRHEIC KERATOSIS: ICD-10-CM

## 2021-03-03 DIAGNOSIS — B00.1 HERPESVIRAL VESICULAR DERMATITIS: ICD-10-CM

## 2021-03-03 DIAGNOSIS — D22 MELANOCYTIC NEVI: ICD-10-CM

## 2021-03-03 DIAGNOSIS — D18.0 HEMANGIOMA: ICD-10-CM

## 2021-03-03 PROBLEM — D22.62 MELANOCYTIC NEVI OF LEFT UPPER LIMB, INCLUDING SHOULDER: Status: ACTIVE | Noted: 2021-03-03

## 2021-03-03 PROBLEM — D22.61 MELANOCYTIC NEVI OF RIGHT UPPER LIMB, INCLUDING SHOULDER: Status: ACTIVE | Noted: 2021-03-03

## 2021-03-03 PROBLEM — D22.72 MELANOCYTIC NEVI OF LEFT LOWER LIMB, INCLUDING HIP: Status: ACTIVE | Noted: 2021-03-03

## 2021-03-03 PROBLEM — D22.39 MELANOCYTIC NEVI OF OTHER PARTS OF FACE: Status: ACTIVE | Noted: 2021-03-03

## 2021-03-03 PROBLEM — D18.01 HEMANGIOMA OF SKIN AND SUBCUTANEOUS TISSUE: Status: ACTIVE | Noted: 2021-03-03

## 2021-03-03 PROBLEM — D22.5 MELANOCYTIC NEVI OF TRUNK: Status: ACTIVE | Noted: 2021-03-03

## 2021-03-03 PROBLEM — D22.71 MELANOCYTIC NEVI OF RIGHT LOWER LIMB, INCLUDING HIP: Status: ACTIVE | Noted: 2021-03-03

## 2021-03-03 PROCEDURE — ? PHOTODYNAMIC THERAPY COUNSELING

## 2021-03-03 PROCEDURE — ? COUNSELING

## 2021-03-03 PROCEDURE — ? ADDITIONAL NOTES

## 2021-03-03 PROCEDURE — ? PRESCRIPTION

## 2021-03-03 PROCEDURE — 99213 OFFICE O/P EST LOW 20 MIN: CPT

## 2021-03-03 PROCEDURE — ? SUNSCREEN RECOMMENDATIONS

## 2021-03-03 RX ADMIN — ACYCLOVIR: 400 TABLET ORAL at 00:00

## 2021-03-03 ASSESSMENT — LOCATION DETAILED DESCRIPTION DERM
LOCATION DETAILED: RIGHT DISTAL POSTERIOR UPPER ARM
LOCATION DETAILED: LEFT PROXIMAL POSTERIOR UPPER ARM
LOCATION DETAILED: RIGHT PROXIMAL PRETIBIAL REGION
LOCATION DETAILED: RIGHT INFERIOR CENTRAL MALAR CHEEK
LOCATION DETAILED: RIGHT VENTRAL PROXIMAL FOREARM
LOCATION DETAILED: LEFT LATERAL PROXIMAL PRETIBIAL REGION
LOCATION DETAILED: LEFT DISTAL POSTERIOR THIGH
LOCATION DETAILED: RIGHT INFERIOR FOREHEAD
LOCATION DETAILED: RIGHT INFERIOR MEDIAL UPPER BACK
LOCATION DETAILED: RIGHT INFERIOR MEDIAL MIDBACK
LOCATION DETAILED: RIGHT DISTAL POSTERIOR THIGH
LOCATION DETAILED: LEFT SUPERIOR MEDIAL MALAR CHEEK
LOCATION DETAILED: RIGHT SUPERIOR UPPER BACK
LOCATION DETAILED: INFERIOR THORACIC SPINE
LOCATION DETAILED: LEFT VENTRAL PROXIMAL FOREARM

## 2021-03-03 ASSESSMENT — LOCATION SIMPLE DESCRIPTION DERM
LOCATION SIMPLE: RIGHT PRETIBIAL REGION
LOCATION SIMPLE: UPPER BACK
LOCATION SIMPLE: RIGHT POSTERIOR UPPER ARM
LOCATION SIMPLE: LEFT CHEEK
LOCATION SIMPLE: RIGHT LOWER BACK
LOCATION SIMPLE: LEFT POSTERIOR THIGH
LOCATION SIMPLE: LEFT POSTERIOR UPPER ARM
LOCATION SIMPLE: RIGHT UPPER BACK
LOCATION SIMPLE: LEFT PRETIBIAL REGION
LOCATION SIMPLE: RIGHT POSTERIOR THIGH
LOCATION SIMPLE: RIGHT CHEEK
LOCATION SIMPLE: RIGHT FOREHEAD
LOCATION SIMPLE: LEFT FOREARM
LOCATION SIMPLE: RIGHT FOREARM

## 2021-03-03 ASSESSMENT — LOCATION ZONE DERM
LOCATION ZONE: ARM
LOCATION ZONE: FACE
LOCATION ZONE: TRUNK
LOCATION ZONE: LEG

## 2021-03-03 NOTE — PROCEDURE: ADDITIONAL NOTES
Render Risk Assessment In Note?: no
Detail Level: Detailed
Additional Notes: Will reschedule patient for PDT, she was unable to come to previous appointment due to being in the hospital

## 2021-03-04 RX ORDER — ACYCLOVIR 400 MG/1
TABLET ORAL
Qty: 20 | Refills: 1 | Status: ERX | COMMUNITY
Start: 2021-03-03

## 2021-03-05 NOTE — DISCHARGE SUMMARY
Discharge Summary    CHIEF COMPLAINT ON ADMISSION  No chief complaint on file.      Reason for Admission  ILEOSTOMY STENOSIS     Admission Date  1/20/2021    CODE STATUS  Prior    HPI & HOSPITAL COURSE  This is a 65 y.o. female who was s/p Exploratory laparotomy, adhesiolysis, revision of ileostomy with release of prior graft of parastomal hernia repair. Immediately post-op was having concerns of increased post-op abd pain. She is opioid dependent. Patient otherwise has been doing very well from a post-op standpoint and tolerated her diet well.     1/22/2021 patient progressed well and was deemed stable for discharge home with close follow up with our office. Labs were reviewed and VSS.        No notes on file    Therefore, she is discharged in good and stable condition to home with close outpatient follow-up.    The patient met 2-midnight criteria for an inpatient stay at the time of discharge.    Discharge Date  1/22/2021    FOLLOW UP ITEMS POST DISCHARGE  Follow up with our office as scheduled.     DISCHARGE DIAGNOSES  Active Problems:    * No active hospital problems. *  Resolved Problems:    * No resolved hospital problems. *      FOLLOW UP  Future Appointments   Date Time Provider Department Center   3/16/2021  4:20 PM Bennett Fleming M.D. [facility] None   3/25/2021  3:20 PM Harvey Monahan M.D. [facility] None   4/15/2021  4:30 PM Chase Charles D.O. [facility] None     Kevin Cruz M.D.  [address]  [place] [address]  [phone]    Schedule an appointment as soon as possible for a visit in 1 week  Follow up appointment    Chase Charles D.O.  [address]  [place] [id number] [phone]    Schedule an appointment as soon as possible for a visit  Follow up appointment      MEDICATIONS ON DISCHARGE     Medication List      CHANGE how you take these medications      Instructions   metoprolol tartrate 50 MG Tabs  What changed: See the new instructions.  Commonly known as: LOPRESSOR    "TAKE 1 TABLET BY MOUTH TWICE DAILY. HOLD IF BLOOD PRESSURE LESS THAN 95/50        CONTINUE taking these medications      Instructions   cetirizine 10 MG Tabs  Commonly known as: ZYRTEC   Take 10 mg by mouth every day.  Dose: 10 mg     diazePAM 5 MG Tabs  Commonly known as: VALIUM   Take 5 mg by mouth every 6 hours as needed (for muscle spasms).  Dose: 5 mg     oxyCODONE immediate release 10 MG immediate release tablet  Commonly known as: ROXICODONE   Take 10 mg by mouth every 6 hours. Indications: Chronic Pain  Dose: 10 mg     potassium chloride ER 10 MEQ tablet  Commonly known as: KLOR-CON   Take 10 mEq by mouth 1 time a day as needed (with Torsemide).  Dose: 10 mEq     PROBIOTIC PO   Take 1 Cap by mouth every evening.  Dose: 1 capsule     spironolactone 25 MG Tabs  Commonly known as: ALDACTONE   Take 2 Tabs by mouth every day.  Dose: 50 mg     torsemide 10 MG tablet  Commonly known as: DEMADEX   Take 10-20 mg by mouth 1 time a day as needed (by weight).  Dose: 10-20 mg     Xhance 93 MCG/ACT Exhu  Generic drug: Fluticasone Propionate   Administer 1 Spray into affected nostril(S) 2 (two) times a day.  Dose: 1 Spray        STOP taking these medications    aspirin 81 MG Chew chewable tablet  Commonly known as: ASA            Allergies  Allergies   Allergen Reactions   • Amitriptyline Unspecified     Nightmares     • Azathioprine Sodium Hives and Shortness of Breath   • Infliximab Hives, Shortness of Breath and Rash     .   • Methotrexate Hives, Shortness of Breath and Rash     .   • Morphine Shortness of Breath, Swelling and Palpitations   • Promethazine Shortness of Breath and Rash     .   • Roxanol [Morphine Sulfate] Swelling     Throat swells   • Carafate [Sucralfate] Vomiting and Nausea     Generalizes/Mouth Sores   • Demerol Vomiting   • Duragesic [Fentanyl]      \"Patches didn't work\"  Skin broke down   • Fentanyl Unspecified     Patches caused skin breakdown, no pain relief   • Lorazepam Unspecified     States " give me nightmares.    • Methadone Unspecified     Didn't work   • Other Drug Unspecified     steroids   • Pregabalin Rash     Rash     • Pseudoephedrine Vomiting   • Sulfa Drugs Hives and Rash     .   • Celestone [Betamethasone Sodium Phosphate] Unspecified     Pt unable to remember   • Sulfasalazine Rash     On chest   • Tizanidine Hydrochloride Unspecified     Pt unable to remember       DIET  No orders of the defined types were placed in this encounter.      ACTIVITY  Light duty.  20-lb lifting restriction    CONSULTATIONS      PROCEDURES  See Op report    LABORATORY  Lab Results   Component Value Date    SODIUM 138 01/25/2021    POTASSIUM 4.0 01/25/2021    CHLORIDE 101 01/25/2021    CO2 28 01/25/2021    GLUCOSE 98 01/25/2021    BUN 13 01/25/2021    CREATININE 0.81 01/25/2021    CREATININE 0.89 02/10/2010    GLOMRATE >59 02/10/2010        Lab Results   Component Value Date    WBC 9.3 01/25/2021    WBC 7.9 02/10/2010    HEMOGLOBIN 12.0 01/25/2021    HEMATOCRIT 37.4 01/25/2021    PLATELETCT 215 01/25/2021        Total time of the discharge process exceeds 30 minutes.

## 2021-03-17 ENCOUNTER — IMMUNIZATION (OUTPATIENT)
Dept: FAMILY PLANNING/WOMEN'S HEALTH CLINIC | Facility: IMMUNIZATION CENTER | Age: 66
End: 2021-03-17
Attending: INTERNAL MEDICINE
Payer: MEDICARE

## 2021-03-17 DIAGNOSIS — Z23 NEED FOR VACCINATION: ICD-10-CM

## 2021-03-17 DIAGNOSIS — Z23 ENCOUNTER FOR VACCINATION: Primary | ICD-10-CM

## 2021-03-17 PROCEDURE — 0001A PFIZER SARS-COV-2 VACCINE: CPT

## 2021-03-17 PROCEDURE — 91300 PFIZER SARS-COV-2 VACCINE: CPT

## 2021-04-08 ENCOUNTER — IMMUNIZATION (OUTPATIENT)
Dept: FAMILY PLANNING/WOMEN'S HEALTH CLINIC | Facility: IMMUNIZATION CENTER | Age: 66
End: 2021-04-08
Attending: INTERNAL MEDICINE
Payer: MEDICARE

## 2021-04-08 DIAGNOSIS — Z23 ENCOUNTER FOR VACCINATION: Primary | ICD-10-CM

## 2021-04-08 PROCEDURE — 0002A PFIZER SARS-COV-2 VACCINE: CPT

## 2021-04-08 PROCEDURE — 91300 PFIZER SARS-COV-2 VACCINE: CPT

## 2021-04-12 RX ORDER — SPIRONOLACTONE 25 MG/1
50 TABLET ORAL DAILY
Qty: 180 TABLET | Refills: 3 | Status: SHIPPED | OUTPATIENT
Start: 2021-04-12 | End: 2022-05-17 | Stop reason: SDUPTHER

## 2021-04-12 NOTE — TELEPHONE ENCOUNTER
----- Message from Jana Overton sent at 4/11/2021  6:12 PM PDT -----  Regarding: Prescription Question  Contact: [phone]  Hi. Can you please send to [facility] prescription for Spironolactone 25 mg / take 2 tablets every morning. I usually get three months at a time. Thank you. Jana Overton

## 2021-04-14 ENCOUNTER — HOSPITAL ENCOUNTER (OUTPATIENT)
Dept: LAB | Facility: MEDICAL CENTER | Age: 66
End: 2021-04-14
Attending: INTERNAL MEDICINE
Payer: MEDICARE

## 2021-04-14 DIAGNOSIS — R89.9 ABNORMAL LABORATORY TEST: ICD-10-CM

## 2021-04-14 PROCEDURE — 36415 COLL VENOUS BLD VENIPUNCTURE: CPT

## 2021-04-14 PROCEDURE — 86039 ANTINUCLEAR ANTIBODIES (ANA): CPT

## 2021-04-14 PROCEDURE — 86038 ANTINUCLEAR ANTIBODIES: CPT

## 2021-04-14 PROCEDURE — 86225 DNA ANTIBODY NATIVE: CPT

## 2021-04-14 PROCEDURE — 86235 NUCLEAR ANTIGEN ANTIBODY: CPT | Mod: 91

## 2021-04-14 PROCEDURE — 86256 FLUORESCENT ANTIBODY TITER: CPT

## 2021-04-15 ENCOUNTER — OFFICE VISIT (OUTPATIENT)
Dept: MEDICAL GROUP | Facility: LAB | Age: 66
End: 2021-04-15
Payer: MEDICARE

## 2021-04-15 VITALS
SYSTOLIC BLOOD PRESSURE: 142 MMHG | OXYGEN SATURATION: 96 % | DIASTOLIC BLOOD PRESSURE: 92 MMHG | BODY MASS INDEX: 22.75 KG/M2 | WEIGHT: 168 LBS | HEIGHT: 72 IN | TEMPERATURE: 97.2 F | RESPIRATION RATE: 12 BRPM | HEART RATE: 86 BPM

## 2021-04-15 DIAGNOSIS — R53.83 FATIGUE, UNSPECIFIED TYPE: ICD-10-CM

## 2021-04-15 DIAGNOSIS — K50.019 CROHN'S DISEASE OF SMALL INTESTINE WITH COMPLICATION (HCC): ICD-10-CM

## 2021-04-15 DIAGNOSIS — E55.9 VITAMIN D DEFICIENCY: ICD-10-CM

## 2021-04-15 DIAGNOSIS — D64.9 ANEMIA, UNSPECIFIED TYPE: ICD-10-CM

## 2021-04-15 LAB — NUCLEAR IGG SER QL IA: DETECTED

## 2021-04-15 PROCEDURE — 99214 OFFICE O/P EST MOD 30 MIN: CPT | Performed by: INTERNAL MEDICINE

## 2021-04-15 ASSESSMENT — FIBROSIS 4 INDEX: FIB4 SCORE: 1.82

## 2021-04-16 LAB
ANA INTERPRETIVE COMMENT Q5143: ABNORMAL
ANA PATTERN Q5144: ABNORMAL
ANA TITER Q5145: ABNORMAL
ANTINUCLEAR ANTIBODY (ANA), HEP-2, IGG Q5142: DETECTED

## 2021-04-16 NOTE — PROGRESS NOTES
CC: Jana Overton is a 65 y.o. female is suffering from   Chief Complaint   Patient presents with   • Follow-Up     3 months follow up   • Fatigue     fatigue since abd surgery in January         SUBJECTIVE:  1. Crohn's disease of small intestine with complication (HCC)  Jana is here for follow-up has a history of Crohn's disease is doing reasonably well, is recovering from surgery.    2. Fatigue, unspecified type  Patient and I have discussed that she is recovering from surgery which she had in March, discussed that this will take time for her to improve.    3. Anemia, unspecified type  Recheck CBC    4. Vitamin D deficiency  Recheck vitamin D        Past social, family, history:   Social History     Tobacco Use   • Smoking status: Never Smoker   • Smokeless tobacco: Never Used   • Tobacco comment: second hand smoke   Substance Use Topics   • Alcohol use: Yes     Alcohol/week: 0.6 oz     Types: 1 Shots of liquor per week     Comment: gin and half a lime; tonic water   • Drug use: Yes     Comment: medical marijuana through bong         MEDICATIONS:    Current Outpatient Medications:   •  spironolactone (ALDACTONE) 25 MG Tab, Take 2 Tablets by mouth every day., Disp: 180 tablet, Rfl: 3  •  XHANCE 93 MCG/ACT Exhaler Suspension, Administer 1 Spray into affected nostril(S) 2 (two) times a day., Disp: , Rfl:   •  potassium chloride ER (KLOR-CON) 10 MEQ tablet, Take 10 mEq by mouth 1 time a day as needed (with Torsemide)., Disp: , Rfl:   •  oxyCODONE immediate release (ROXICODONE) 10 MG immediate release tablet, Take 10 mg by mouth every 6 hours. Indications: Chronic Pain, Disp: , Rfl:   •  metoprolol (LOPRESSOR) 50 MG Tab, TAKE 1 TABLET BY MOUTH TWICE DAILY. HOLD IF BLOOD PRESSURE LESS THAN 95/50 (Patient taking differently: Take 50 mg by mouth 2 times a day.), Disp: 180 Tab, Rfl: 3  •  cetirizine (ZYRTEC) 10 MG Tab, Take 10 mg by mouth every day., Disp: , Rfl:   •  torsemide (DEMADEX) 10 MG tablet, Take 10-20  mg by mouth 1 time a day as needed (by weight)., Disp: , Rfl:   •  Probiotic Product (PROBIOTIC PO), Take 1 Cap by mouth every evening., Disp: , Rfl:   •  diazePAM (VALIUM) 5 MG Tab, Take 5 mg by mouth every 6 hours as needed (for muscle spasms)., Disp: , Rfl:     PROBLEMS:  Patient Active Problem List    Diagnosis Date Noted   • 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[facility]) 12/28/2018   • DVT (deep venous thrombosis) ([facility]) 12/31/2018   • Anemia 12/15/2018   • Anxiety disorder due to multiple medical problems 12/01/2017   • Crohn's disease of small intestine with complication ([facility]) 09/23/2009   • Paroxysmal atrial fibrillation ([facility]) 03/26/2015   • Chronic pain 09/23/2009   • Migraine 06/04/2019   • Hypertension 05/20/2019   • History of MRSA infection 12/29/2018   • History of infection with vancomycin resistant Enterococcus (VRE) 12/29/2018   • Dysphasia 12/28/2018   • Thrush of mouth and esophagus ([facility]) 12/13/2018   • Liver enzyme elevation 12/12/2018   • Leukocytosis 12/12/2018   • Chronic respiratory failure with hypoxia ([facility]) 03/20/2018   • DDD (degenerative disc disease), lumbar 04/12/2017   • History of DVT (deep vein thrombosis) 11/23/2016   • Sleep apnea 10/26/2014   • Postherpetic neuralgia 06/24/2014   • Multiple falls 06/24/2014   • COPD (chronic obstructive pulmonary disease) ([facility]) 06/24/2014   • Rosacea 06/24/2014   • Ileostomy in place ([facility]) 06/26/2013   • GERD (gastroesophageal reflux disease) 12/05/2012   • Status post lumbar surgery 07/16/2012   • Opioid type dependence, continuous (CMS-HCC) 09/23/2009       REVIEW OF SYSTEMS:  Gen.:  No Nausea, Vomiting, fever, Chills.  Heart: No chest pain.  Lungs:  No shortness of Breath.  Psychological: Gr unusual Anxiety depression     PHYSICAL EXAM   Constitutional: Alert, cooperative, not in acute distress.  Cardiovascular:  Rate Rhythm is regular without murmurs rubs clicks.     Thorax & Lungs: Clear to auscultation, no wheezing, rhonchi, or rales  HENT: Normocephalic,  Atraumatic.  Eyes: PERRLA, EOMI, Conjunctiva normal.   Neck: Trachia is midline no swelling of the thyroid.   Lymphatic: No lymphadenopathy noted.   Neurologic: Alert & oriented x 3, cranial nerves II through XII are intact, Normal motor function, Normal sensory function, No focal deficits noted.   Psychiatric: Affect normal, Judgment normal, Mood normal.     VITAL SIGNS:/92   Pulse 86   Temp 36.2 °C (97.2 °F) (Temporal)   Resp 12   Ht 1.829 m (6')   Wt 76.2 kg (168 lb)   SpO2 96%   BMI 22.78 kg/m²     Labs: Reviewed    Assessment:                                                     Plan:    1. Crohn's disease of small intestine with complication (HCC)  Recheck labs  - Comp Metabolic Panel; Future  - CBC WITH DIFFERENTIAL; Future  - FREE THYROXINE; Future  - TSH; Future  - VITAMIN B12; Future  - FOLATE; Future    2. Fatigue, unspecified type  Likely multifactorial including recent surgery check free T4 TSH  - Comp Metabolic Panel; Future  - CBC WITH DIFFERENTIAL; Future  - FREE THYROXINE; Future  - TSH; Future  - VITAMIN B12; Future  - FOLATE; Future    3. Anemia, unspecified type  Check B12 and folate  - VITAMIN B12; Future  - FOLATE; Future    4. Vitamin D deficiency  Recheck vitamin D  - VITAMIN D,25 HYDROXY; Future

## 2021-04-18 LAB
DSDNA AB TITR SER CLIF: DETECTED {TITER}
ENA JO1 AB TITR SER: 5 AU/ML (ref 0–40)
ENA SCL70 IGG SER QL: 6 AU/ML (ref 0–40)
ENA SM IGG SER-ACNC: 8 AU/ML (ref 0–40)
ENA SS-B IGG SER IA-ACNC: 2 AU/ML (ref 0–40)
SSA52 R0ENA AB IGG Q0420: 3 AU/ML (ref 0–40)
SSA60 R0ENA AB IGG Q0419: 2 AU/ML (ref 0–40)

## 2021-04-19 LAB
DSDNA IGG TITR SER CLIF: NORMAL {TITER}
U1 SNRNP IGG SER QL: NORMAL

## 2021-04-28 ENCOUNTER — HOSPITAL ENCOUNTER (OUTPATIENT)
Dept: LAB | Facility: MEDICAL CENTER | Age: 66
End: 2021-04-28
Attending: INTERNAL MEDICINE
Payer: MEDICARE

## 2021-04-28 DIAGNOSIS — K50.019 CROHN'S DISEASE OF SMALL INTESTINE WITH COMPLICATION (HCC): ICD-10-CM

## 2021-04-28 DIAGNOSIS — D64.9 ANEMIA, UNSPECIFIED TYPE: ICD-10-CM

## 2021-04-28 DIAGNOSIS — R53.83 FATIGUE, UNSPECIFIED TYPE: ICD-10-CM

## 2021-04-28 DIAGNOSIS — E55.9 VITAMIN D DEFICIENCY: ICD-10-CM

## 2021-04-28 LAB
ALBUMIN SERPL BCP-MCNC: 4.2 G/DL (ref 3.2–4.9)
ALBUMIN/GLOB SERPL: 1.2 G/DL
ALP SERPL-CCNC: 156 U/L (ref 30–99)
ALT SERPL-CCNC: 38 U/L (ref 2–50)
ANION GAP SERPL CALC-SCNC: 12 MMOL/L (ref 7–16)
AST SERPL-CCNC: 25 U/L (ref 12–45)
BASOPHILS # BLD AUTO: 0.8 % (ref 0–1.8)
BASOPHILS # BLD: 0.06 K/UL (ref 0–0.12)
BILIRUB SERPL-MCNC: 0.5 MG/DL (ref 0.1–1.5)
BUN SERPL-MCNC: 15 MG/DL (ref 8–22)
CALCIUM SERPL-MCNC: 9.7 MG/DL (ref 8.4–10.2)
CHLORIDE SERPL-SCNC: 103 MMOL/L (ref 96–112)
CO2 SERPL-SCNC: 24 MMOL/L (ref 20–33)
CREAT SERPL-MCNC: 0.95 MG/DL (ref 0.5–1.4)
EOSINOPHIL # BLD AUTO: 0.27 K/UL (ref 0–0.51)
EOSINOPHIL NFR BLD: 3.5 % (ref 0–6.9)
ERYTHROCYTE [DISTWIDTH] IN BLOOD BY AUTOMATED COUNT: 42.9 FL (ref 35.9–50)
FASTING STATUS PATIENT QL REPORTED: NORMAL
FOLATE SERPL-MCNC: 16.4 NG/ML
GLOBULIN SER CALC-MCNC: 3.6 G/DL (ref 1.9–3.5)
GLUCOSE SERPL-MCNC: 97 MG/DL (ref 65–99)
HCT VFR BLD AUTO: 43.1 % (ref 37–47)
HGB BLD-MCNC: 14 G/DL (ref 12–16)
IMM GRANULOCYTES # BLD AUTO: 0.02 K/UL (ref 0–0.11)
IMM GRANULOCYTES NFR BLD AUTO: 0.3 % (ref 0–0.9)
LYMPHOCYTES # BLD AUTO: 1.89 K/UL (ref 1–4.8)
LYMPHOCYTES NFR BLD: 24.3 % (ref 22–41)
MCH RBC QN AUTO: 29.3 PG (ref 27–33)
MCHC RBC AUTO-ENTMCNC: 32.5 G/DL (ref 33.6–35)
MCV RBC AUTO: 90.2 FL (ref 81.4–97.8)
MONOCYTES # BLD AUTO: 0.67 K/UL (ref 0–0.85)
MONOCYTES NFR BLD AUTO: 8.6 % (ref 0–13.4)
NEUTROPHILS # BLD AUTO: 4.88 K/UL (ref 2–7.15)
NEUTROPHILS NFR BLD: 62.5 % (ref 44–72)
NRBC # BLD AUTO: 0 K/UL
NRBC BLD-RTO: 0 /100 WBC
PLATELET # BLD AUTO: 231 K/UL (ref 164–446)
PMV BLD AUTO: 9.8 FL (ref 9–12.9)
POTASSIUM SERPL-SCNC: 3.8 MMOL/L (ref 3.6–5.5)
PROT SERPL-MCNC: 7.8 G/DL (ref 6–8.2)
RBC # BLD AUTO: 4.78 M/UL (ref 4.2–5.4)
SODIUM SERPL-SCNC: 139 MMOL/L (ref 135–145)
T4 FREE SERPL-MCNC: 1.11 NG/DL (ref 0.93–1.7)
TSH SERPL DL<=0.005 MIU/L-ACNC: 0.86 UIU/ML (ref 0.38–5.33)
VIT B12 SERPL-MCNC: 487 PG/ML (ref 211–911)
WBC # BLD AUTO: 7.8 K/UL (ref 4.8–10.8)

## 2021-04-28 PROCEDURE — 82306 VITAMIN D 25 HYDROXY: CPT

## 2021-04-28 PROCEDURE — 36415 COLL VENOUS BLD VENIPUNCTURE: CPT

## 2021-04-28 PROCEDURE — 82607 VITAMIN B-12: CPT

## 2021-04-28 PROCEDURE — 80053 COMPREHEN METABOLIC PANEL: CPT

## 2021-04-28 PROCEDURE — 84443 ASSAY THYROID STIM HORMONE: CPT

## 2021-04-28 PROCEDURE — 85025 COMPLETE CBC W/AUTO DIFF WBC: CPT

## 2021-04-28 PROCEDURE — 82746 ASSAY OF FOLIC ACID SERUM: CPT

## 2021-04-28 PROCEDURE — 84439 ASSAY OF FREE THYROXINE: CPT

## 2021-04-29 LAB — 25(OH)D3 SERPL-MCNC: 18 NG/ML (ref 30–80)

## 2021-05-02 ENCOUNTER — PATIENT MESSAGE (OUTPATIENT)
Dept: MEDICAL GROUP | Facility: LAB | Age: 66
End: 2021-05-02

## 2021-05-02 DIAGNOSIS — K50.019 CROHN'S DISEASE OF SMALL INTESTINE WITH COMPLICATION (HCC): ICD-10-CM

## 2021-05-02 DIAGNOSIS — R11.0 NAUSEA: ICD-10-CM

## 2021-05-03 RX ORDER — ONDANSETRON 4 MG/1
4-8 TABLET, ORALLY DISINTEGRATING ORAL EVERY 8 HOURS PRN
Qty: 20 TABLET | Refills: 3 | Status: SHIPPED | OUTPATIENT
Start: 2021-05-03 | End: 2021-08-17 | Stop reason: SDUPTHER

## 2021-05-03 NOTE — TELEPHONE ENCOUNTER
From: Jana Overton  To: Physician Chase Charles  Sent: 5/2/2021 10:49 AM PDT  Subject: Prescription Question    Please send to [facility], a prescription for Onadansetron ODT 4mg tablets. Take 1-2 tablets as needed for nausea. Thank you. Jana Overton

## 2021-05-03 NOTE — PATIENT COMMUNICATION
Received request via: Patient    Was the patient seen in the last year in this department? Yes   LOV: 04/2021    Does the patient have an active prescription (recently filled or refills available) for medication(s) requested? No

## 2021-05-20 ENCOUNTER — OFFICE VISIT (OUTPATIENT)
Dept: CARDIOLOGY | Facility: MEDICAL CENTER | Age: 66
End: 2021-05-20
Payer: MEDICARE

## 2021-05-20 VITALS
RESPIRATION RATE: 18 BRPM | SYSTOLIC BLOOD PRESSURE: 118 MMHG | HEIGHT: 72 IN | BODY MASS INDEX: 22.21 KG/M2 | DIASTOLIC BLOOD PRESSURE: 74 MMHG | OXYGEN SATURATION: 94 % | WEIGHT: 164 LBS | HEART RATE: 84 BPM

## 2021-05-20 DIAGNOSIS — I48.0 PAF (PAROXYSMAL ATRIAL FIBRILLATION) (HCC): ICD-10-CM

## 2021-05-20 LAB — EKG IMPRESSION: NORMAL

## 2021-05-20 PROCEDURE — 99203 OFFICE O/P NEW LOW 30 MIN: CPT | Performed by: INTERNAL MEDICINE

## 2021-05-20 PROCEDURE — 93000 ELECTROCARDIOGRAM COMPLETE: CPT | Performed by: INTERNAL MEDICINE

## 2021-05-20 RX ORDER — ACYCLOVIR 400 MG/1
TABLET ORAL
COMMUNITY
Start: 2021-03-03 | End: 2021-05-27

## 2021-05-20 ASSESSMENT — FIBROSIS 4 INDEX: FIB4 SCORE: 1.18

## 2021-05-20 NOTE — PROGRESS NOTES
Arrhythmia Clinic Note (New patient)     DOS: 5/20/2021    Referring physician: Dr Monahan    Chief complaint/Reason for consult: Palpitations    HPI: 65-year-old female with prior medical history of Crohn's disease and many surgeries in the past, prior diagnosis of atrial fibrillation not on oral anticoagulation, presenting for evaluation of palpitations.  She wore a cardiac event recorder in the winter and says she has not talked to a doctor about the results of that yet.  While she was wearing the monitor she did sustain a dog attack which led to some artifact readings on the monitor.  Her symptoms are mainly a feeling of heaviness and tingling in her extremities in addition to chest tightness episodes, these can occur with exertion and limit her functionally to a degree.  She is not sure was causing these.  She did have the symptoms while she was wearing her monitor and they correlated to normal sinus rhythm.  Her monitor did detect tachycardia as fast as 180 bpm which was read by the monitor is sinus but interpreted as possible SVT, she was not having symptoms during any of those episodes.    ROS (+ highlighted in bold):  Constitutional: Fevers/chills/fatigue/weightloss  HEENT: Blurry vision/eye pain/sore throat/hearing loss  Respiratory: Shortness of breath/cough  Cardiovascular: Chest pain/palpitations/edema/orthopnea/syncope  GI: Nausea/vomitting/diarrhea  MSK: Arthralgias/myagias/muscle weakness  Skin: Rash/sores  Neurological: Numbness/tremors/vertigo  Endocrine: Excessive thirst/polyuria/cold intolerance/heat intolerance  Psych: Depression/anxiety    Past Medical History:   Diagnosis Date   • Anesthesia     difficult Iv stick   • Anxiety    • Arthritis     all over   • ASTHMA    • Atrial fib/flut    • Backpain    • Breath shortness    • Bronchitis     5 years   • Chronic pain    • Colostomy in place ([facility]) 10/14/2010   • COPD (chronic obstructive pulmonary disease) ([facility]) 6/24/2014   • COPD (chronic  obstructive pulmonary disease) ([facility]) 6/24/2014   • Crohn's disease of colon ([facility])    • Dyspnea    • History of cardiac murmur    • Hypokalemia 6/24/2014   • Indigestion    • Infectious disease     MRSA, VancoRSA   • Multiple falls 6/24/2014   • Narcotic dependence ([facility]) 9/23/2009   • Obstruction    • Other specified disorder of intestines     crohns disease   • Pain 7/11/13    all over   • Pneumonia     child and mid 30's   • Postherpetic neuralgia 6/24/2014   • Rosacea 6/24/2014   • Sleep apnea        Past Surgical History:   Procedure Laterality Date   • ILEOSTOMY  1/20/2021    Procedure: ILEOSTOMY- COMPLEX REVISION,;  Surgeon: Kevin Cruz M.D.;  Location: [facility];  Service: General   • LYSIS ADHESIONS GENERAL  1/20/2021    Procedure: LYSIS, ADHESIONS;  Surgeon: Kevin Cruz M.D.;  Location: [facility];  Service: General   • GASTROSCOPY N/A 5/22/2019    Procedure: GASTROSCOPY - WITH DILATION;  Surgeon: Dionicio Cardona M.D.;  Location: [facility];  Service: Gastroenterology   • GASTROSCOPY  1/6/2019    Procedure: GASTROSCOPY- WITH DILATION;  Surgeon: Daniel Daniels M.D.;  Location: [facility];  Service: Gastroenterology   • ILEOSTOMY  12/29/2018    Procedure: ILEOSTOMY- REVISION;  Surgeon: Kevin Cruz M.D.;  Location: [facility];  Service: General   • SIGMOIDOSCOPY FLEX  12/29/2018    Procedure: SIGMOIDOSCOPY FLEX;  Surgeon: Kevin Cruz M.D.;  Location: [facility];  Service: General   • EXPLORATORY LAPAROTOMY  12/29/2018    Procedure: EXPLORATORY LAPAROTOMY, lysis of adhesions;  Surgeon: Kevin Cruz M.D.;  Location: [facility];  Service: General   • ILEOSTOMY  5/14/2014    Performed by Kevin Cruz M.D. at [facility]   • MAMMOPLASTY REDUCTION  7/17/2013    Performed by Janey Burt M.D. at [facility]   • HARDWARE REMOVAL NEURO  7/16/2012    Performed by KEELEY KIM at  SURGERY [facility] ORS   • GASTROSCOPY  10/4/2011    Performed by TYSON MARTINEZ at SURGERY SAME DAY [facility] ORS   • COLONOSCOPY  10/4/2011    Performed by TYSON MARTINEZ at SURGERY SAME DAY [facility]S   • DILATION AND CURETTAGE  9/24/2010    Performed by GAURAV ALY at SURGERY SAME DAY [facility]   • ILEOSTOMY  11/11/2009    Performed by SYDNEE AUGUSTINE at SURGERY [facility] ORS   • GASTROSCOPY WITH BIOPSY  11/22/08    Performed by NEGRITA HUYNH at SURGERY [facility] ORS   • ABDOMINAL EXPLORATION     • CERVICAL DISK AND FUSION ANTERIOR     • COLON RESECTION     • FOOT SURGERY     • HAND SURGERY     • LUMBAR FUSION ANTERIOR     • LUMPECTOMY     • OTHER ABDOMINAL SURGERY      surgery for chrons disease   • PA REDUCTION OF BREAST         Social History     Socioeconomic History   • Marital status:      Spouse name: Not on file   • Number of children: Not on file   • Years of education: Not on file   • Highest education level: Not on file   Occupational History   • Not on file   Tobacco Use   • Smoking status: Never Smoker   • Smokeless tobacco: Never Used   • Tobacco comment: second hand smoke   Vaping Use   • Vaping Use: Never used   Substance and Sexual Activity   • Alcohol use: Yes     Alcohol/week: 0.6 oz     Types: 1 Shots of liquor per week     Comment: gin and half a lime; tonic water   • Drug use: Yes     Comment: medical marijuana through bong   • Sexual activity: Not on file     Comment:    Other Topics Concern   • Not on file   Social History Narrative   • Not on file     Social Determinants of Health     Financial Resource Strain:    • Difficulty of Paying Living Expenses:    Food Insecurity: No Food Insecurity   • Worried About Running Out of Food in the Last Year: Never true   • Ran Out of Food in the Last Year: Never true   Transportation Needs: No Transportation Needs   • Lack of Transportation (Medical): No   • Lack of Transportation (Non-Medical): No   Physical  "Activity:    • Days of Exercise per Week:    • Minutes of Exercise per Session:    Stress:    • Feeling of Stress :    Social Connections:    • Frequency of Communication with Friends and Family:    • Frequency of Social Gatherings with Friends and Family:    • Attends Sikh Services:    • Active Member of Clubs or Organizations:    • Attends Club or Organization Meetings:    • Marital Status:    Intimate Partner Violence:    • Fear of Current or Ex-Partner:    • Emotionally Abused:    • Physically Abused:    • Sexually Abused:        Family History   Problem Relation Age of Onset   • Lung Disease Mother         copd   • Diabetes Father    • Heart Disease Father    • Diabetes Sister    • Cancer Maternal Aunt         breast       Allergies   Allergen Reactions   • Amitriptyline Unspecified     Nightmares     • Azathioprine Sodium Hives and Shortness of Breath   • Infliximab Hives, Shortness of Breath and Rash     .   • Methotrexate Hives, Shortness of Breath and Rash     .   • Morphine Shortness of Breath, Swelling and Palpitations   • Promethazine Shortness of Breath and Rash     .   • Roxanol [Morphine Sulfate] Swelling     Throat swells   • Carafate [Sucralfate] Vomiting and Nausea     Generalizes/Mouth Sores   • Demerol Vomiting   • Duragesic [Fentanyl]      \"Patches didn't work\"  Skin broke down   • Fentanyl Unspecified     Patches caused skin breakdown, no pain relief   • Lorazepam Unspecified     States give me nightmares.    • Methadone Unspecified     Didn't work   • Other Drug Unspecified     steroids   • Pregabalin Rash     Rash     • Pseudoephedrine Vomiting   • Sulfa Drugs Hives and Rash     .   • Celestone [Betamethasone Sodium Phosphate] Unspecified     Pt unable to remember   • Sulfasalazine Rash     On chest   • Tizanidine Hydrochloride Unspecified     Pt unable to remember       Current Outpatient Medications   Medication Sig Dispense Refill   • acyclovir (ZOVIRAX) 400 MG tablet      • " ondansetron (ZOFRAN ODT) 4 MG TABLET DISPERSIBLE Take 1-2 Tablets by mouth every 8 hours as needed for Nausea. 20 tablet 3   • spironolactone (ALDACTONE) 25 MG Tab Take 2 Tablets by mouth every day. 180 tablet 3   • XHANCE 93 MCG/ACT Exhaler Suspension Administer 1 Spray into affected nostril(S) 2 (two) times a day.     • potassium chloride ER (KLOR-CON) 10 MEQ tablet Take 10 mEq by mouth 1 time a day as needed (with Torsemide).     • oxyCODONE immediate release (ROXICODONE) 10 MG immediate release tablet Take 10 mg by mouth every 6 hours. Indications: Chronic Pain     • metoprolol (LOPRESSOR) 50 MG Tab TAKE 1 TABLET BY MOUTH TWICE DAILY. HOLD IF BLOOD PRESSURE LESS THAN 95/50 (Patient taking differently: Take 50 mg by mouth 2 times a day.) 180 Tab 3   • torsemide (DEMADEX) 10 MG tablet Take 10-20 mg by mouth 1 time a day as needed (by weight).     • Probiotic Product (PROBIOTIC PO) Take 1 Cap by mouth every evening.     • diazePAM (VALIUM) 5 MG Tab Take 5 mg by mouth every 6 hours as needed (for muscle spasms).       No current facility-administered medications for this visit.       Physical Exam:  Vitals:    05/20/21 1504   BP: 118/74   BP Location: Left arm   Patient Position: Sitting   BP Cuff Size: Adult   Pulse: 84   Resp: 18   SpO2: 94%   Weight: 74.4 kg (164 lb)   Height: 1.829 m (6')     General appearance: NAD, conversant   Eyes: anicteric sclerae, moist conjunctivae; no lid-lag; PERRLA  HENT: Atraumatic; oropharynx clear with moist mucous membranes and no mucosal ulcerations; normal hard and soft palate  Neck: Trachea midline; FROM, supple, no thyromegaly or lymphadenopathy  Lungs: CTA, with normal respiratory effort and no intercostal retractions  CV: RRR, no MRGs, no JVD   Abdomen: Soft, non-tender; no masses or HSM  Extremities: No peripheral edema or extremity lymphadenopathy  Skin: Normal temperature, turgor and texture; no rash, ulcers or subcutaneous nodules  Psych: Appropriate affect, alert and  oriented to person, place and time    Data:  Lipids:   Lab Results   Component Value Date/Time    CHOLSTRLTOT 198 05/07/2020 01:34 PM    TRIGLYCERIDE 131 05/07/2020 01:34 PM    HDL 80 05/07/2020 01:34 PM    LDL 92 05/07/2020 01:34 PM        BMP:  Lab Results   Component Value Date/Time    SODIUM 139 04/28/2021 0759    POTASSIUM 3.8 04/28/2021 0759    CHLORIDE 103 04/28/2021 0759    CO2 24 04/28/2021 0759    GLUCOSE 97 04/28/2021 0759    BUN 15 04/28/2021 0759    CREATININE 0.95 04/28/2021 0759    CALCIUM 9.7 04/28/2021 0759    ANION 12.0 04/28/2021 0759        TSH:   Lab Results   Component Value Date/Time    TSHULTRASEN 0.864 04/28/2021 0759        THYROXINE (T4):   Lab Results   Component Value Date/Time    FREEDIR 1.28 10/20/2010 1100        CBC:   Lab Results   Component Value Date/Time    WBC 7.8 04/28/2021 07:59 AM    WBC 7.9 02/10/2010 04:04 PM    RBC 4.78 04/28/2021 07:59 AM    RBC 4.86 02/10/2010 04:04 PM    HEMOGLOBIN 14.0 04/28/2021 07:59 AM    HEMATOCRIT 43.1 04/28/2021 07:59 AM    MCV 90.2 04/28/2021 07:59 AM    MCV 86 02/10/2010 04:04 PM    MCH 29.3 04/28/2021 07:59 AM    MCH 27.8 02/10/2010 04:04 PM    MCHC 32.5 (L) 04/28/2021 07:59 AM    RDW 42.9 04/28/2021 07:59 AM    RDW 14.6 02/10/2010 04:04 PM    PLATELETCT 231 04/28/2021 07:59 AM    MPV 9.8 04/28/2021 07:59 AM    NEUTSPOLYS 62.50 04/28/2021 07:59 AM    LYMPHOCYTES 24.30 04/28/2021 07:59 AM    MONOCYTES 8.60 04/28/2021 07:59 AM    EOSINOPHILS 3.50 04/28/2021 07:59 AM    EOSINOPHILS 34 01/28/2021 02:40 PM    BASOPHILS 0.80 04/28/2021 07:59 AM    IMMGRAN 0.30 04/28/2021 07:59 AM    IMMGRAN 0 02/10/2010 04:04 PM    NRBC 0.00 04/28/2021 07:59 AM    NEUTS 4.88 04/28/2021 07:59 AM    NEUTS 4.8 02/10/2010 04:04 PM    LYMPHS 1.89 04/28/2021 07:59 AM    LYMPHS 4 01/28/2021 02:40 PM    MONOS 0.67 04/28/2021 07:59 AM    MONOS 0.6 02/10/2010 04:04 PM    EOS 0.27 04/28/2021 07:59 AM    EOS 0.2 02/10/2010 04:04 PM    BASO 0.06 04/28/2021 07:59 AM    BASO 0.1  02/10/2010 04:04 PM    IMMGRANAB 0.02 04/28/2021 07:59 AM    IMMGRANAB 0.0 02/10/2010 04:04 PM    NRBCAB 0.00 04/28/2021 07:59 AM        CBC w/o DIFF  Lab Results   Component Value Date/Time    WBC 7.8 04/28/2021 07:59 AM    WBC 7.9 02/10/2010 04:04 PM    RBC 4.78 04/28/2021 07:59 AM    RBC 4.86 02/10/2010 04:04 PM    HEMOGLOBIN 14.0 04/28/2021 07:59 AM    MCV 90.2 04/28/2021 07:59 AM    MCV 86 02/10/2010 04:04 PM    MCH 29.3 04/28/2021 07:59 AM    MCH 27.8 02/10/2010 04:04 PM    MCHC 32.5 (L) 04/28/2021 07:59 AM    RDW 42.9 04/28/2021 07:59 AM    RDW 14.6 02/10/2010 04:04 PM    MPV 9.8 04/28/2021 07:59 AM       Prior echo/stress results reviewed: Normal EF     EKG interpreted by me: normal sinus    Cardiac event recorder interpreted by me: Symptoms correlate to normal sinus rhythm    Impression/Plan:  1. PAF (paroxysmal atrial fibrillation) ([facility])  EKG     1.  Palpitations    -She takes metoprolol 50 mg twice daily for treatment of her palpitations which she says helps  -It is unclear if she has ever had sustained atrial fibrillation before.  She notes that she was told she had atrial fibrillation which was captured on telemetry monitors in the hospital setting.  However she has now worn multiple cardiac event recorder is without any sustained episodes of atrial fibrillation.  Regardless, she is not on oral anticoagulation, refusing this due to concerns for GI bleeding.  -Symptoms on her cardiac event recorder correlated to normal sinus rhythm.  It does not appear that her symptoms are cardiac in nature.  -Given her chest tightness with exertion, could consider ischemic evaluation.  She had a PET scan in 2018.  I will defer ischemic evaluation to Dr. Monahan.    Overall it does not seem like she has much of a burden of atrial fibrillation if any at all.  She can continue to take metoprolol for symptom benefit.  I think it is reasonable to withhold anticoagulation given the lack of evidence of sustained atrial  fibrillation, she does not want to take anyways.  She will continue to follow-up with Dr. Monahan and has an appointment next week.  Certainly is welcome to see me again in the future if she has documented sustained arrhythmias requiring evaluation for electrophysiology study or ablation.    Bennett Fleming MD  Cardiac Electrophysiology

## 2021-05-26 ENCOUNTER — APPOINTMENT (RX ONLY)
Dept: URBAN - METROPOLITAN AREA CLINIC 20 | Facility: CLINIC | Age: 66
Setting detail: DERMATOLOGY
End: 2021-05-26

## 2021-05-26 DIAGNOSIS — Z41.9 ENCOUNTER FOR PROCEDURE FOR PURPOSES OTHER THAN REMEDYING HEALTH STATE, UNSPECIFIED: ICD-10-CM

## 2021-05-26 PROCEDURE — ? SCITON BBL

## 2021-05-26 ASSESSMENT — LOCATION DETAILED DESCRIPTION DERM
LOCATION DETAILED: LEFT INFERIOR CENTRAL MALAR CHEEK
LOCATION DETAILED: RIGHT INFERIOR CENTRAL MALAR CHEEK
LOCATION DETAILED: INFERIOR MID FOREHEAD

## 2021-05-26 ASSESSMENT — LOCATION ZONE DERM: LOCATION ZONE: FACE

## 2021-05-26 ASSESSMENT — LOCATION SIMPLE DESCRIPTION DERM
LOCATION SIMPLE: INFERIOR FOREHEAD
LOCATION SIMPLE: LEFT CHEEK
LOCATION SIMPLE: RIGHT CHEEK

## 2021-05-26 NOTE — PROCEDURE: SCITON BBL
Pulse Duration: 15
Repetition Rate (Hz): 10
Cooling ?: Yes
Treatment Number: 1
Pulse Duration Units: milliseconds
Spot Size: Finesse Adapter Size: 15 x 45 mm (No Finesse Adapter)
Passes: 2
Fluence (J/Cm2): 25
Fluence (J/Cm2): 9
Additional Comments (Optional): same setting using 15x15
Cooling (In C): 20
Indication Override (Will Not Show Above If Text Entered): vascular
Spot Size: Finesse Adapter Size: 15 x 15 mm square
Fluence (J/Cm2): 14
Detail Level: Zone
Anesthesia Volume In Cc: 0
Consent: Written consent obtained, risks reviewed including but not limited to crusting, scabbing, blistering, scarring, darker or lighter pigmentary change, bruising, and/or incomplete response.
Preprocedure Text: The treatment areas were thoroughly cleaned. Any exposed at risk hair that was not to be treated was covered in white paper tape. Clear ultrasound gel was applied to the treatment area. The area was treated with no immediate stacking of pulses.
Post-Care Instructions: I reviewed with the patient in detail post-care instructions. Patient should stay away from the sun and wear sun protection until treated areas are fully healed.
Cooling (In C): 22
Price (Use Numbers Only, No Special Characters Or $): 450
Post Procedure Text: The patient tolerated the procedure well. Ice-chilled washclothes were applied to the treatment area for comfort. Post care was reviewed with the patient.
Hide Repetition Rate?: No
Fluence (J/Cm2): 8

## 2021-05-27 ENCOUNTER — TELEMEDICINE (OUTPATIENT)
Dept: CARDIOLOGY | Facility: MEDICAL CENTER | Age: 66
End: 2021-05-27
Payer: MEDICARE

## 2021-05-27 VITALS
HEIGHT: 72 IN | WEIGHT: 168 LBS | OXYGEN SATURATION: 96 % | HEART RATE: 72 BPM | SYSTOLIC BLOOD PRESSURE: 100 MMHG | DIASTOLIC BLOOD PRESSURE: 70 MMHG | BODY MASS INDEX: 22.75 KG/M2

## 2021-05-27 DIAGNOSIS — I48.0 PAROXYSMAL ATRIAL FIBRILLATION (HCC): ICD-10-CM

## 2021-05-27 DIAGNOSIS — J96.11 CHRONIC RESPIRATORY FAILURE WITH HYPOXIA (HCC): ICD-10-CM

## 2021-05-27 DIAGNOSIS — R06.02 SHORTNESS OF BREATH: ICD-10-CM

## 2021-05-27 DIAGNOSIS — K50.019 CROHN'S DISEASE OF SMALL INTESTINE WITH COMPLICATION (HCC): ICD-10-CM

## 2021-05-27 DIAGNOSIS — J44.9 CHRONIC OBSTRUCTIVE PULMONARY DISEASE, UNSPECIFIED COPD TYPE (HCC): ICD-10-CM

## 2021-05-27 DIAGNOSIS — I10 ESSENTIAL HYPERTENSION: ICD-10-CM

## 2021-05-27 DIAGNOSIS — I82.431 ACUTE DEEP VEIN THROMBOSIS (DVT) OF POPLITEAL VEIN OF RIGHT LOWER EXTREMITY (HCC): ICD-10-CM

## 2021-05-27 PROCEDURE — 99214 OFFICE O/P EST MOD 30 MIN: CPT | Mod: 95 | Performed by: INTERNAL MEDICINE

## 2021-05-27 ASSESSMENT — FIBROSIS 4 INDEX: FIB4 SCORE: 1.18

## 2021-05-27 NOTE — PROGRESS NOTES
Cardiology Virtual Follow-up Consultation Note    Date of note:    5/27/2021  Primary Care Provider: Amanda Bazzi M.D.    Name:             Jana Overton   YOB: 1955  MRN:               [id number]    CC: palpitations.      Patient ID/HPI:   Jana Overton is a 65 y.o. female whose current medical problems include hypertension,  DVT, parosxysmal atrial fibrillation, TRUDY, COPD, and crohn's disease who is here for follow-up.     Clinic visit: 12/27/2017, at that time:     She also has moderate severity left sided chest tightness which radiates down her left arm. This is not necessarily associated with exertion.  She has been told to go to the emergency room multiple times for evaluation, however she continues to refuse as she's scared of getting inadequate treatment there.     SBP at home occasionally down to 70s/40s, HR up to 160s. + LE edema     She is currently not taking metoprolol or lasix. She does not take aspirin daily as it causes severe burning in her stomach.    Does see a pain specialist Dr. Telles and has been working on down-titrating opiates.     At our clinic visit: 12/29/2017  She still has chest pain, occasionally pressure-like at rest, resolves within minutes, also has sharp chest pain at times, associated with movement.     At our visit, 1/24/2018:  Pulse does go very high with exercise up to 140s with very mild exercise. Ziopatch showed periodic SVT and likely atrial fibrillation.  Still having palpitation daily.  No passing out, no falls.     In terms of TRUDY, she started her CPAP and feels much better in terms of her energy level and concentration.     In terms of her hypertension, 90s-100s/60s. Highest is 140s/90s.     Taking torsemide once a week along with potassium.     Still having moderate severity right sided chest pain associated with palpitations and ear pounding.     At our clinic visit, 5/30/2019:  2 days of drenching sweats, severe fatigue yesterday.  Associated with palpitations.  Up to taking metoprolol 25mg four times a day. Usual sitting pulse is 60, last was in the 100s.     Has also had nausea and anorexia and likely crohn's flare. Followed by Dr. Mahan and Dr. Sepulveda.      BP at home in the 110s-160s.  No syncope.     Currently with minimal cardiac symptoms, continues to have abdominal pain.     At our visit, 8/25/2020:  Had multiple crohn's flares and heat stroke since our last visit. Currently feeling well.      Just weaned herself off oxycodone.     In terms of a fib, since she's feeling well, no a fib recently.     Interim History:  When she climbs at elevation, she does get some chest tightness that resolves with rest and is associated with dizziness. When swimming she does ok.     Hypertension well controlled.     Does have palpitations and orthostatic dizziness at times.       ROS  + joint pain.      HENT: Negative for nosebleeds.    Eyes: Negative for vision loss in left eye and vision loss in right eye.   Respiratory: Negative for hemoptysis.    Gastrointestinal: Negative for hematemesis, hematochezia and melena.   Genitourinary: Negative for hematuria.   Neurological: Negative for focal weakness, numbness and paresthesias.     Past Medical History:   Diagnosis Date   • Anesthesia     difficult Iv stick   • Anxiety    • Arthritis     all over   • ASTHMA    • Atrial fib/flut    • Backpain    • Breath shortness    • Bronchitis     5 years   • Chronic pain    • Colostomy in place ([facility]) 10/14/2010   • COPD (chronic obstructive pulmonary disease) ([facility]) 6/24/2014   • COPD (chronic obstructive pulmonary disease) ([facility]) 6/24/2014   • Crohn's disease of colon ([facility])    • Dyspnea    • History of cardiac murmur    • Hypokalemia 6/24/2014   • Indigestion    • Infectious disease     MRSA, VancoRSA   • Multiple falls 6/24/2014   • Narcotic dependence ([facility]) 9/23/2009   • Obstruction    • Other specified disorder of intestines     crohns disease   • Pain 7/11/13     all over   • Pneumonia     child and mid 30's   • Postherpetic neuralgia 6/24/2014   • Rosacea 6/24/2014   • Sleep apnea          Past Surgical History:   Procedure Laterality Date   • ILEOSTOMY  1/20/2021    Procedure: ILEOSTOMY- COMPLEX REVISION,;  Surgeon: Kevin Cruz M.D.;  Location: [facility];  Service: General   • LYSIS ADHESIONS GENERAL  1/20/2021    Procedure: LYSIS, ADHESIONS;  Surgeon: Kevin Cruz M.D.;  Location: SURGERY [facility];  Service: General   • GASTROSCOPY N/A 5/22/2019    Procedure: GASTROSCOPY - WITH DILATION;  Surgeon: Dionicio Cardona M.D.;  Location: SURGERY [facility];  Service: Gastroenterology   • GASTROSCOPY  1/6/2019    Procedure: GASTROSCOPY- WITH DILATION;  Surgeon: Daniel Daniels M.D.;  Location: SURGERY [facility];  Service: Gastroenterology   • ILEOSTOMY  12/29/2018    Procedure: ILEOSTOMY- REVISION;  Surgeon: Kevin Cruz M.D.;  Location: SURGERY [facility];  Service: General   • SIGMOIDOSCOPY FLEX  12/29/2018    Procedure: SIGMOIDOSCOPY FLEX;  Surgeon: Kevin Cruz M.D.;  Location: SURGERY [facility];  Service: General   • EXPLORATORY LAPAROTOMY  12/29/2018    Procedure: EXPLORATORY LAPAROTOMY, lysis of adhesions;  Surgeon: Kevin Cruz M.D.;  Location: [facility];  Service: General   • ILEOSTOMY  5/14/2014    Performed by Kevin Cruz M.D. at [facility] ORS   • MAMMOPLASTY REDUCTION  7/17/2013    Performed by Janey Burt M.D. at [facility]   • HARDWARE REMOVAL NEURO  7/16/2012    Performed by KEELEY KIM at SURGERY [facility] ORS   • GASTROSCOPY  10/4/2011    Performed by TYSON MARTINEZ at SURGERY SAME DAY [facility] ORS   • COLONOSCOPY  10/4/2011    Performed by TYSON MARTINEZ at SURGERY SAME DAY [facility] ORS   • DILATION AND CURETTAGE  9/24/2010    Performed by GAURAV ALY at SURGERY SAME DAY [facility] ORS   • ILEOSTOMY  11/11/2009    Performed by KEVIN CRUZ  SURGERY [facility] ORS   • GASTROSCOPY WITH BIOPSY  11/22/08    Performed by NEGRITA HUYNH at SURGERY [facility] ORS   • ABDOMINAL EXPLORATION     • CERVICAL DISK AND FUSION ANTERIOR     • COLON RESECTION     • FOOT SURGERY     • HAND SURGERY     • LUMBAR FUSION ANTERIOR     • LUMPECTOMY     • OTHER ABDOMINAL SURGERY      surgery for chrons disease   • DE REDUCTION OF BREAST           Current Outpatient Medications   Medication Sig Dispense Refill   • ondansetron (ZOFRAN ODT) 4 MG TABLET DISPERSIBLE Take 1-2 Tablets by mouth every 8 hours as needed for Nausea. 20 tablet 3   • spironolactone (ALDACTONE) 25 MG Tab Take 2 Tablets by mouth every day. 180 tablet 3   • XHANCE 93 MCG/ACT Exhaler Suspension Administer 1 Spray into affected nostril(S) 2 (two) times a day.     • potassium chloride ER (KLOR-CON) 10 MEQ tablet Take 10 mEq by mouth 1 time a day as needed (with Torsemide).     • oxyCODONE immediate release (ROXICODONE) 10 MG immediate release tablet Take 10 mg by mouth every 6 hours. Indications: Chronic Pain     • metoprolol (LOPRESSOR) 50 MG Tab TAKE 1 TABLET BY MOUTH TWICE DAILY. HOLD IF BLOOD PRESSURE LESS THAN 95/50 (Patient taking differently: Take 50 mg by mouth 2 times a day.) 180 Tab 3   • torsemide (DEMADEX) 10 MG tablet Take 10-20 mg by mouth 1 time a day as needed (by weight).     • Probiotic Product (PROBIOTIC PO) Take 1 Cap by mouth every evening.     • diazePAM (VALIUM) 5 MG Tab Take 5 mg by mouth every 6 hours as needed (for muscle spasms).       No current facility-administered medications for this visit.         Allergies   Allergen Reactions   • Amitriptyline Unspecified     Nightmares     • Azathioprine Sodium Hives and Shortness of Breath   • Infliximab Hives, Shortness of Breath and Rash     .   • Methotrexate Hives, Shortness of Breath and Rash     .   • Morphine Shortness of Breath, Swelling and Palpitations   • Promethazine Shortness of Breath and Rash     .   • Roxanol [Morphine  "Sulfate] Swelling     Throat swells   • Carafate [Sucralfate] Vomiting and Nausea     Generalizes/Mouth Sores   • Demerol Vomiting   • Duragesic [Fentanyl]      \"Patches didn't work\"  Skin broke down   • Fentanyl Unspecified     Patches caused skin breakdown, no pain relief   • Lorazepam Unspecified     States give me nightmares.    • Methadone Unspecified     Didn't work   • Other Drug Unspecified     steroids   • Pregabalin Rash     Rash     • Pseudoephedrine Vomiting   • Sulfa Drugs Hives and Rash     .   • Celestone [Betamethasone Sodium Phosphate] Unspecified     Pt unable to remember   • Sulfasalazine Rash     On chest   • Tizanidine Hydrochloride Unspecified     Pt unable to remember         Family History   Problem Relation Age of Onset   • Lung Disease Mother         copd   • Diabetes Father    • Heart Disease Father    • Diabetes Sister    • Cancer Maternal Aunt         breast         Social History     Socioeconomic History   • Marital status:      Spouse name: Not on file   • Number of children: Not on file   • Years of education: Not on file   • Highest education level: Not on file   Occupational History   • Not on file   Tobacco Use   • Smoking status: Never Smoker   • Smokeless tobacco: Never Used   • Tobacco comment: second hand smoke   Vaping Use   • Vaping Use: Never used   Substance and Sexual Activity   • Alcohol use: Not Currently     Alcohol/week: 0.6 oz     Types: 1 Shots of liquor per week     Comment: gin and half a lime; tonic water   • Drug use: Not Currently     Comment: medical marijuana through bong   • Sexual activity: Not on file     Comment:    Other Topics Concern   • Not on file   Social History Narrative   • Not on file     Social Determinants of Health     Financial Resource Strain:    • Difficulty of Paying Living Expenses:    Food Insecurity: No Food Insecurity   • Worried About Running Out of Food in the Last Year: Never true   • Ran Out of Food in the Last Year: " Never true   Transportation Needs: No Transportation Needs   • Lack of Transportation (Medical): No   • Lack of Transportation (Non-Medical): No   Physical Activity:    • Days of Exercise per Week:    • Minutes of Exercise per Session:    Stress:    • Feeling of Stress :    Social Connections:    • Frequency of Communication with Friends and Family:    • Frequency of Social Gatherings with Friends and Family:    • Attends [employer] Services:    • Active Member of Clubs or Organizations:    • Attends Club or Organization Meetings:    • Marital Status:    Intimate Partner Violence:    • Fear of Current or Ex-Partner:    • Emotionally Abused:    • Physically Abused:    • Sexually Abused:          Physical Exam:  Ambulatory Vitals  /70 (BP Location: Left arm, Patient Position: Sitting, BP Cuff Size: Adult)   Pulse 72   Ht 1.829 m (6')   Wt 76.2 kg (168 lb)   SpO2 96%    Oxygen Therapy:  Pulse Oximetry: 96 % (Taken at 1pm today)  BP Readings from Last 4 Encounters:   05/27/21 100/70   05/20/21 118/74   04/15/21 142/92   01/28/21 132/45       Weight/BMI: Body mass index is 22.78 kg/m².  Wt Readings from Last 4 Encounters:   05/27/21 76.2 kg (168 lb)   05/20/21 74.4 kg (164 lb)   04/15/21 76.2 kg (168 lb)   01/28/21 72.7 kg (160 lb 4.4 oz)       General: No apparent distress  Eyes: nl conjunctiva  Neck: JVP appeared normal from afar  Lungs: normal respiratory effort  Neurological: Moving upper extremities.   Psychiatric: Appropriate affect, A/O x 3, intact judgement and insight  Skin: no visible rashes          Lab Data Review:  Lab Results   Component Value Date/Time    CHOLSTRLTOT 198 05/07/2020 01:34 PM    LDL 92 05/07/2020 01:34 PM    HDL 80 05/07/2020 01:34 PM    TRIGLYCERIDE 131 05/07/2020 01:34 PM       Lab Results   Component Value Date/Time    SODIUM 139 04/28/2021 07:59 AM    POTASSIUM 3.8 04/28/2021 07:59 AM    CHLORIDE 103 04/28/2021 07:59 AM    CO2 24 04/28/2021 07:59 AM    GLUCOSE 97 04/28/2021  07:59 AM    BUN 15 04/28/2021 07:59 AM    CREATININE 0.95 04/28/2021 07:59 AM    CREATININE 0.89 02/10/2010 04:04 PM    BUNCREATRAT 17 02/10/2010 04:04 PM    GLOMRATE >59 02/10/2010 04:04 PM     Lab Results   Component Value Date/Time    ALKPHOSPHAT 156 (H) 04/28/2021 07:59 AM    ASTSGOT 25 04/28/2021 07:59 AM    ALTSGPT 38 04/28/2021 07:59 AM    TBILIRUBIN 0.5 04/28/2021 07:59 AM      Lab Results   Component Value Date/Time    WBC 7.8 04/28/2021 07:59 AM    WBC 7.9 02/10/2010 04:04 PM     Lab Results   Component Value Date/Time    HBA1C 5.3 08/27/2015 01:50 PM     No components found for: TROP          Cardiac Imaging and Procedures Review:    EKG dated 12/26/2017 : My personal interpretation is NSR, LAE, RsR' in V1/V2       Ziopatch 12/2020:  CONCLUSIONS:   * 13 days and 9 hours of ziopatch recordings reviewed.   * Most symptoms associated with sinus rhythm in the 80s, however one episode was associated with heart rates up to 180s. Read as sinus by computer, but based on tracings I believe this was a supraventricular tachycardia. These were on 11/28 at around 2:35PM. Correlate clinically.   * No clear atrial fibrillation.   * Rare PACs/PVCs   * No ventricular tachycardia, automated read incorrectly interpreted artifact.       TTE (12/27/2017):  CONCLUSIONS  1. Normal right and left ventricular size and function. Normal regional wall motion  2. Abnormal septal motion consistent with RV volume overload, however estimated right atrial pressure by IVC assessment is normal  3. No significant valve disease or flow abnormalities.   4. Unable to estimate pulmonary artery pressure due to an inadequate tricuspid regurgitant jet.    Compared to the images of the study done 4/3/13 - there has been no significant change.      Nuclear Perfusion Imaging (2013):   RESULTS:  1.  There were no new ECG changes and no arrhythmia.    2.  Heart rate did increase to 92 and the blood pressure negrito somewhat to   162/77 after an initial  drop to 117 systolic but then returned to baseline.  3.  The raw data demonstrated no unexpected extracardiac isotope uptake,   fairly dense collection of isotope in the right mid to lower abdomen.  4.  Rest-stress image comparison reveals no perfusion abnormalities in any of   the multiple SPECT image panels.  5.  The gated wall motion study demonstrated low cardiac volumes with very   small end systolic volume.  The global contractility was normal, EF 74%.    Regional contractility also normal.     SUMMARY:  Lexiscan stress myocardial perfusion stress imaging with technetium   99m tetrofosmin demonstratin.  No infarct or ischemia.  2.  Normal global and hyperdynamic regional function, EF 74%.  3.  No ECG changes or arrhythmia.  4.  No prior for comparison.              Radiology test Review:  CXR:   The mediastinal and cardiac silhouette is unremarkable.    The pulmonary vascularity is within normal limits.    The lung parenchyma demonstrates no airspace process. Calcified granuloma in the right lateral mid hemithorax is unchanged..    There is no significant pleural effusion.    There is no visible pneumothorax.    There are no acute bony abnormalities.   Impression        1.  No evidence of acute cardiopulmonary process.         PET 2018:  ELECTROCARDIOGRAPHIC FINDINGS:  EKG review shows a normal sinus rhythm with no   ischemic ST segment changes at rest or stress.     SCINTOGRAPHIC FINDINGS:  There is normal homogenous tracer uptake at rest and   stress.     GATED WALL MOTION FINDINGS:  Show an ejection fraction of 72% at rest, which   increases to 78% at peak stress.     CONCLUSIONS AND IMPRESSION:  Normal cardiac stress test.        Medical Decision Makin. Atrial fibrillation, unspecified type (CMS-HCC)  Paroxysmal, asymptomatic.  Also has occasional short runs of symptomatic SVT.  given normotension at home, her [id number] score of 2.  We previously have discussed at length risk/benefit of  anticoagulation given her history of crohns, and she has opted for only anticoagulation with aspirin at this time.  -continue aspirin for AC for now. She tolerates only 81mg PO daily due to stomach discomfort.    -continue metoprolol to 50mg PO bid for rate control (she takes this 25mg qid at times which is fine). She does get symptomatic hypotension at higher doses.   -consider digoxin as an adjunctive agent if worsening symptomatic palpitations.     2. Other chest pain  Previous negative stress test, but now with worsening SOB as well.   -check echocardiogram  -check CCS, given multiple previous negative stress tests, if CCS is >100, we may consider cardiac cath for her symptoms vs a coronary CTA.   -sucralfate for GI ppx while on aspirin.   -ED precautions discussed.   -cont metoprolol     3. Essential (primary) hypertension   Likely white coat hypertension given very low readings at home that she has confirmed with her PCP's blood pressure cuff. Her Bps have recently been labile possibly due to dehydration and variable bowel habits  -cont spironolactone, currently at 50mg QAM.   -hydralazine 10mg PO prn SBP >150  -continue metoprolol.      4. Leg swelling  -continue spironolactone daily. Stopped lasix/potassium prn as she has had some dehydration episodes. Leg swelling much improved today. Currently on torsemide prn.     5. TRUDY  -continue CPAP, many of symptoms much improved.       This evaluation was conducted via Zoom using secure and encrypted videoconferencing technology. The patient was in a private location in the [facility].    The patient's identity was confirmed and verbal consent was obtained for this virtual visit.    Return in about 3 months (around 8/27/2021).      Harvey Monahan MD  [facility]  [facility], [address]  [address], [address] [address]  Phone: [phone]  Fax: [phone]

## 2021-06-06 ENCOUNTER — APPOINTMENT (OUTPATIENT)
Dept: RADIOLOGY | Facility: MEDICAL CENTER | Age: 66
End: 2021-06-06
Attending: INTERNAL MEDICINE
Payer: MEDICARE

## 2021-07-15 ENCOUNTER — APPOINTMENT (RX ONLY)
Dept: URBAN - METROPOLITAN AREA CLINIC 20 | Facility: CLINIC | Age: 66
Setting detail: DERMATOLOGY
End: 2021-07-15

## 2021-07-15 DIAGNOSIS — Z41.9 ENCOUNTER FOR PROCEDURE FOR PURPOSES OTHER THAN REMEDYING HEALTH STATE, UNSPECIFIED: ICD-10-CM

## 2021-07-15 PROCEDURE — ? SCITON BBL

## 2021-07-15 ASSESSMENT — LOCATION DETAILED DESCRIPTION DERM
LOCATION DETAILED: RIGHT CENTRAL MALAR CHEEK
LOCATION DETAILED: LEFT INFERIOR CENTRAL MALAR CHEEK
LOCATION DETAILED: LEFT MEDIAL FOREHEAD
LOCATION DETAILED: LEFT CHIN

## 2021-07-15 ASSESSMENT — LOCATION ZONE DERM: LOCATION ZONE: FACE

## 2021-07-15 ASSESSMENT — LOCATION SIMPLE DESCRIPTION DERM
LOCATION SIMPLE: RIGHT CHEEK
LOCATION SIMPLE: LEFT FOREHEAD
LOCATION SIMPLE: CHIN
LOCATION SIMPLE: LEFT CHEEK

## 2021-07-15 NOTE — PROCEDURE: SCITON BBL
Passes: 1
Consent: Written consent obtained, risks reviewed including but not limited to crusting, scabbing, blistering, scarring, darker or lighter pigmentary change, bruising, and/or incomplete response.
Fluence (J/Cm2): 25
Fluence (J/Cm2): 14
Pulse Duration Units: milliseconds
Anesthesia Volume In Cc: 0
Cooling (In C): 15
Pulse Duration: 20
Post-Care Instructions: I reviewed with the patient in detail post-care instructions. Patient should stay away from the sun and wear sun protection until treated areas are fully healed.
Post Procedure Text: The patient tolerated the procedure well. Ice-chilled washclothes were applied to the treatment area for comfort. Post care was reviewed with the patient.
Preprocedure Text: The treatment areas were thoroughly cleaned. Any exposed at risk hair that was not to be treated was covered in white paper tape. Clear ultrasound gel was applied to the treatment area. The area was treated with no immediate stacking of pulses.
Spot Size: Finesse Adapter Size: 15 x 45 mm (No Finesse Adapter)
Additional Comments (Optional): same setting using 15x15
Detail Level: Zone
Hide Repetition Rate?: No
Cooling ?: Yes
Spot Size: Finesse Adapter Size: 15 x 15 mm square
Fluence (J/Cm2): 8
Cooling (In C): 18
Repetition Rate (Hz): 10
Price (Use Numbers Only, No Special Characters Or $): 450
Passes: 2
Indication Override (Will Not Show Above If Text Entered): Vascular

## 2021-07-24 NOTE — CARE PLAN
Problem: Nutritional:  Goal: Achieve adequate nutritional intake  Patient will consume >50% of meals   Outcome: PROGRESSING AS EXPECTED         CARON Colvin RN

## 2021-08-03 ENCOUNTER — HOSPITAL ENCOUNTER (OUTPATIENT)
Dept: CARDIOLOGY | Facility: MEDICAL CENTER | Age: 66
End: 2021-08-03
Attending: INTERNAL MEDICINE
Payer: MEDICARE

## 2021-08-03 DIAGNOSIS — R06.02 SHORTNESS OF BREATH: ICD-10-CM

## 2021-08-03 PROCEDURE — 93306 TTE W/DOPPLER COMPLETE: CPT

## 2021-08-05 LAB
LV EJECT FRACT  99904: 60
LV EJECT FRACT MOD 2C 99903: 56.44
LV EJECT FRACT MOD 4C 99902: 70.47
LV EJECT FRACT MOD BP 99901: 64.84

## 2021-08-05 PROCEDURE — 93306 TTE W/DOPPLER COMPLETE: CPT | Mod: 26 | Performed by: INTERNAL MEDICINE

## 2021-08-17 ENCOUNTER — OFFICE VISIT (OUTPATIENT)
Dept: MEDICAL GROUP | Facility: LAB | Age: 66
End: 2021-08-17
Payer: MEDICARE

## 2021-08-17 VITALS
OXYGEN SATURATION: 94 % | SYSTOLIC BLOOD PRESSURE: 148 MMHG | HEART RATE: 134 BPM | TEMPERATURE: 97.3 F | DIASTOLIC BLOOD PRESSURE: 80 MMHG

## 2021-08-17 DIAGNOSIS — B37.81 THRUSH OF MOUTH AND ESOPHAGUS (HCC): ICD-10-CM

## 2021-08-17 DIAGNOSIS — B37.0 THRUSH OF MOUTH AND ESOPHAGUS (HCC): ICD-10-CM

## 2021-08-17 DIAGNOSIS — K50.019 CROHN'S DISEASE OF SMALL INTESTINE WITH COMPLICATION (HCC): ICD-10-CM

## 2021-08-17 PROCEDURE — 99214 OFFICE O/P EST MOD 30 MIN: CPT | Performed by: NURSE PRACTITIONER

## 2021-08-17 RX ORDER — ONDANSETRON 4 MG/1
4-8 TABLET, ORALLY DISINTEGRATING ORAL EVERY 8 HOURS PRN
Qty: 20 TABLET | Refills: 3 | Status: SHIPPED | OUTPATIENT
Start: 2021-08-17 | End: 2022-04-19

## 2021-08-17 ASSESSMENT — PAIN SCALES - GENERAL: PAINLEVEL: 8=MODERATE-SEVERE PAIN

## 2021-08-17 NOTE — PROGRESS NOTES
Subjective:     CC: Diagnoses of Crohn's disease of small intestine with complication (HCC) and Thrush of mouth and esophagus (HCC) were pertinent to this visit.    HPI:   Jana presents today with the following:    Thrush of mouth and esophagus (HCC)  Patient presents today with thrush. Onset was 2 days ago. She has used nystatin swish and swallow in the past with relief. She requests a refill at this time.     Crohn's disease of small intestine with complication (HCC)  This is a chronic condition. Patient has ileostomy. Followed by Dr. Cruz. She reports that she is currently having an acute flare, but manages at home with diet changes and pain medications. She reports that she has had many flares in the past and the symptoms are identical. She requests a refill of her Zofran at this time.     Current Outpatient Medications Ordered in Epic   Medication Sig Dispense Refill   • nystatin (MYCOSTATIN) 811606 UNIT/ML Suspension Take 5 mL by mouth 4 times a day. 120 mL 0   • ondansetron (ZOFRAN ODT) 4 MG TABLET DISPERSIBLE Take 1-2 Tablets by mouth every 8 hours as needed for Nausea. 20 Tablet 3   • spironolactone (ALDACTONE) 25 MG Tab Take 2 Tablets by mouth every day. 180 tablet 3   • XHANCE 93 MCG/ACT Exhaler Suspension Administer 1 Spray into affected nostril(S) 2 (two) times a day.     • potassium chloride ER (KLOR-CON) 10 MEQ tablet Take 10 mEq by mouth 1 time a day as needed (with Torsemide).     • oxyCODONE immediate release (ROXICODONE) 10 MG immediate release tablet Take 10 mg by mouth every 6 hours. Indications: Chronic Pain     • metoprolol (LOPRESSOR) 50 MG Tab TAKE 1 TABLET BY MOUTH TWICE DAILY. HOLD IF BLOOD PRESSURE LESS THAN 95/50 (Patient taking differently: Take 50 mg by mouth 2 times a day.) 180 Tab 3   • torsemide (DEMADEX) 10 MG tablet Take 10-20 mg by mouth 1 time a day as needed (by weight).     • Probiotic Product (PROBIOTIC PO) Take 1 Cap by mouth every evening.     • diazePAM (VALIUM) 5 MG Tab  Take 5 mg by mouth every 6 hours as needed (for muscle spasms).       No current Epic-ordered facility-administered medications on file.     ROS:   Gen: no fevers/chills, no changes in weight  Eyes: no changes in vision  ENT: no sore throat, no hearing loss, no bloody nose  Pulm: no sob, no cough  CV: no chest pain, no palpitations  : no dysuria  MSk: no myalgias  Skin: no rash  Neuro: no headaches, no numbness/tingling  Heme/Lymph: no easy bruising        - NOTE: All other systems reviewed and are negative, except as in HPI.    Objective:     Exam: /80   Pulse (!) 134   Temp 36.3 °C (97.3 °F) (Temporal)   SpO2 94%  There is no height or weight on file to calculate BMI.    General: Normal appearing. No distress.  HEENT: Normocephalic. Eyes conjunctiva clear lids without ptosis, pupils equal and reactive to light accommodation, ears normal shape and contour, canals are clear bilaterally, tympanic membranes are benign, nasal mucosa benign, thick white coating on tongue and buccal mucosa.   Neck: Supple without JVD or bruit. Thyroid is not enlarged.  Pulmonary: Clear to ausculation.  Normal effort. No rales, ronchi, or wheezing.  Cardiovascular: Regular rate and rhythm without murmur. Carotid and radial pulses are intact and equal bilaterally.  Abdomen: Soft, nontender, nondistended. Normal bowel sounds. Liver and spleen are not palpable  Neurologic: Grossly nonfocal  Lymph: No cervical or supraclavicular lymph nodes are palpable  Skin: Warm and dry.  No obvious lesions.  Musculoskeletal: Normal gait. No extremity cyanosis, clubbing, or edema.  Psych: Normal mood and affect. Alert and oriented x3. Judgment and insight is normal.    Assessment & Plan:     66 y.o. female with the following -     1. Crohn's disease of small intestine with complication (HCC)  Patient to take medication as prescribed. Continue to follow with GI. Continue to monitor.   - ondansetron (ZOFRAN ODT) 4 MG TABLET DISPERSIBLE; Take 1-2  Tablets by mouth every 8 hours as needed for Nausea.  Dispense: 20 Tablet; Refill: 3    2. Thrush of mouth and esophagus (HCC)  Patient to take medication as prescribed. Continue to monitor.  - nystatin (MYCOSTATIN) 820838 UNIT/ML Suspension; Take 5 mL by mouth 4 times a day.  Dispense: 120 mL; Refill: 0    Return in about 2 weeks (around 8/31/2021).    Please note that this dictation was created using voice recognition software. I have made every reasonable attempt to correct obvious errors, but I expect that there are errors of grammar and possibly content that I did not discover before finalizing the note.

## 2021-08-17 NOTE — ASSESSMENT & PLAN NOTE
Patient presents today with thrush. Onset was 2 days ago. She has used nystatin swish and swallow in the past with relief. She requests a refill at this time.

## 2021-08-17 NOTE — ASSESSMENT & PLAN NOTE
This is a chronic condition. Patient has ileostomy. Followed by Dr. Cruz. She reports that she is currently having an acute flare, but manages at home with diet changes and pain medications. She reports that she has had many flares in the past and the symptoms are identical. She requests a refill of her Zofran at this time.

## 2021-08-18 ENCOUNTER — HOSPITAL ENCOUNTER (EMERGENCY)
Facility: MEDICAL CENTER | Age: 66
End: 2021-08-18
Payer: MEDICARE

## 2021-08-18 ENCOUNTER — HOSPITAL ENCOUNTER (OUTPATIENT)
Dept: RADIOLOGY | Facility: MEDICAL CENTER | Age: 66
End: 2021-08-18
Attending: INTERNAL MEDICINE
Payer: MEDICARE

## 2021-08-18 DIAGNOSIS — R06.02 SHORTNESS OF BREATH: ICD-10-CM

## 2021-08-18 PROCEDURE — 302449 STATCHG TRIAGE ONLY (STATISTIC)

## 2021-08-18 NOTE — ED NOTES
Found pt down the huynh, refused to be checked in. Reports she is waiting for . Dismissed from board.as per request.

## 2021-08-21 DIAGNOSIS — B37.0 THRUSH OF MOUTH AND ESOPHAGUS (HCC): ICD-10-CM

## 2021-08-21 DIAGNOSIS — K50.019 CROHN'S DISEASE OF SMALL INTESTINE WITH COMPLICATION (HCC): ICD-10-CM

## 2021-08-21 DIAGNOSIS — B37.81 THRUSH OF MOUTH AND ESOPHAGUS (HCC): ICD-10-CM

## 2021-08-21 RX ORDER — PROCHLORPERAZINE MALEATE 10 MG
10 TABLET ORAL
Qty: 10 TABLET | Refills: 0 | Status: SHIPPED | OUTPATIENT
Start: 2021-08-21 | End: 2022-09-12 | Stop reason: SDUPTHER

## 2021-08-25 DIAGNOSIS — I10 ESSENTIAL HYPERTENSION: ICD-10-CM

## 2021-08-26 RX ORDER — METOPROLOL TARTRATE 50 MG/1
TABLET, FILM COATED ORAL
Qty: 180 TABLET | Refills: 2 | Status: SHIPPED | OUTPATIENT
Start: 2021-08-26 | End: 2022-05-20 | Stop reason: SDUPTHER

## 2021-09-03 ENCOUNTER — TELEPHONE (OUTPATIENT)
Dept: MEDICAL GROUP | Facility: LAB | Age: 66
End: 2021-09-03

## 2021-09-03 DIAGNOSIS — R13.19 ESOPHAGEAL DYSPHAGIA: ICD-10-CM

## 2021-09-03 NOTE — TELEPHONE ENCOUNTER
Eliz:  Please call Jana, find out if she has a pharmacy close to where she is living, has she had problems with oral candidiasis in the past?  Referral has been written to [facility].   Regards, Chase Charles, DO

## 2021-09-03 NOTE — TELEPHONE ENCOUNTER
1. Caller Name: Jana                         Call Back Number: [phone] (home)        How would the patient prefer to be contacted with a response: Phone call OK to leave a detailed message    Pt is requesting an urgent referral to the [place], [place] for her esophageal problems.  She cannot swallow her meds, sores down her throat.  She used to see Dr Kristel Mahan for this.

## 2021-09-07 ENCOUNTER — TELEPHONE (OUTPATIENT)
Dept: MEDICAL GROUP | Facility: LAB | Age: 66
End: 2021-09-07

## 2021-09-07 NOTE — TELEPHONE ENCOUNTER
1. Caller Name: Jana Overton                          Call Back Number: [phone] (home)         How would the patient prefer to be contacted with a response:     Pt asking to speak to Dr. Charles she cannot keep anything down

## 2021-09-07 NOTE — TELEPHONE ENCOUNTER
Hello phone call to the patient they are currently in [place], she is having problems with keeping fluid down, states that she cannot eat, I have recommended she get to the nearest [facility] in emergency room, we have discussed [facility].  Concerned that trying to go to additional days to [facility] may be too long if acutely dehydrated.

## 2021-09-14 ENCOUNTER — TELEPHONE (OUTPATIENT)
Dept: CARDIOLOGY | Facility: MEDICAL CENTER | Age: 66
End: 2021-09-14

## 2021-09-14 NOTE — TELEPHONE ENCOUNTER
Spoke to patient in regards to virtual visit with JB today 09/14/21 at 1415. Pt stated she is currently at [facility] and will NOT be able to do the virtual visit with JB. Pt stated she will be calling [facility] back when she is discharged to reschedule appointment. Pt wanted to advise JB she is currently going in and out of A-fib.

## 2021-10-11 ENCOUNTER — OFFICE VISIT (OUTPATIENT)
Dept: MEDICAL GROUP | Facility: LAB | Age: 66
End: 2021-10-11
Payer: MEDICARE

## 2021-10-11 VITALS
HEART RATE: 128 BPM | BODY MASS INDEX: 21.4 KG/M2 | TEMPERATURE: 96.8 F | RESPIRATION RATE: 16 BRPM | HEIGHT: 72 IN | SYSTOLIC BLOOD PRESSURE: 170 MMHG | WEIGHT: 158 LBS | OXYGEN SATURATION: 96 % | DIASTOLIC BLOOD PRESSURE: 60 MMHG

## 2021-10-11 DIAGNOSIS — R13.10 DYSPHAGIA, UNSPECIFIED TYPE: ICD-10-CM

## 2021-10-11 DIAGNOSIS — Z23 NEED FOR IMMUNIZATION AGAINST INFLUENZA: ICD-10-CM

## 2021-10-11 DIAGNOSIS — I77.6 VASCULITIS (HCC): ICD-10-CM

## 2021-10-11 DIAGNOSIS — R77.1 ABNORMALITY OF GLOBULIN: ICD-10-CM

## 2021-10-11 PROCEDURE — G0008 ADMIN INFLUENZA VIRUS VAC: HCPCS | Performed by: INTERNAL MEDICINE

## 2021-10-11 PROCEDURE — 99214 OFFICE O/P EST MOD 30 MIN: CPT | Mod: 25 | Performed by: INTERNAL MEDICINE

## 2021-10-11 PROCEDURE — 90662 IIV NO PRSV INCREASED AG IM: CPT | Performed by: INTERNAL MEDICINE

## 2021-10-11 ASSESSMENT — FIBROSIS 4 INDEX: FIB4 SCORE: 1.19

## 2021-10-12 ENCOUNTER — HOSPITAL ENCOUNTER (OUTPATIENT)
Dept: LAB | Facility: MEDICAL CENTER | Age: 66
End: 2021-10-12
Attending: PHYSICIAN ASSISTANT
Payer: MEDICARE

## 2021-10-12 ENCOUNTER — HOSPITAL ENCOUNTER (OUTPATIENT)
Dept: LAB | Facility: MEDICAL CENTER | Age: 66
End: 2021-10-12
Attending: INTERNAL MEDICINE
Payer: MEDICARE

## 2021-10-12 DIAGNOSIS — R13.10 DYSPHAGIA, UNSPECIFIED TYPE: ICD-10-CM

## 2021-10-12 DIAGNOSIS — I77.6 VASCULITIS (HCC): ICD-10-CM

## 2021-10-12 DIAGNOSIS — R77.1 ABNORMALITY OF GLOBULIN: ICD-10-CM

## 2021-10-12 DIAGNOSIS — E87.6 HYPOKALEMIA: ICD-10-CM

## 2021-10-12 DIAGNOSIS — R00.2 PALPITATIONS: ICD-10-CM

## 2021-10-12 LAB
ALBUMIN SERPL BCP-MCNC: 4.2 G/DL (ref 3.2–4.9)
ALBUMIN/GLOB SERPL: 1.1 G/DL
ALP SERPL-CCNC: 104 U/L (ref 30–99)
ALT SERPL-CCNC: 13 U/L (ref 2–50)
ANION GAP SERPL CALC-SCNC: 14 MMOL/L (ref 7–16)
ANION GAP SERPL CALC-SCNC: 15 MMOL/L (ref 7–16)
AST SERPL-CCNC: 22 U/L (ref 12–45)
BASOPHILS # BLD AUTO: 0.6 % (ref 0–1.8)
BASOPHILS # BLD: 0.05 K/UL (ref 0–0.12)
BILIRUB SERPL-MCNC: 0.6 MG/DL (ref 0.1–1.5)
BUN SERPL-MCNC: 14 MG/DL (ref 8–22)
BUN SERPL-MCNC: 14 MG/DL (ref 8–22)
CALCIUM SERPL-MCNC: 10 MG/DL (ref 8.4–10.2)
CALCIUM SERPL-MCNC: 10.2 MG/DL (ref 8.4–10.2)
CHLORIDE SERPL-SCNC: 102 MMOL/L (ref 96–112)
CHLORIDE SERPL-SCNC: 102 MMOL/L (ref 96–112)
CO2 SERPL-SCNC: 22 MMOL/L (ref 20–33)
CO2 SERPL-SCNC: 22 MMOL/L (ref 20–33)
CREAT SERPL-MCNC: 0.92 MG/DL (ref 0.5–1.4)
CREAT SERPL-MCNC: 0.97 MG/DL (ref 0.5–1.4)
CRP SERPL HS-MCNC: 0.32 MG/DL (ref 0–0.75)
EOSINOPHIL # BLD AUTO: 0.11 K/UL (ref 0–0.51)
EOSINOPHIL NFR BLD: 1.4 % (ref 0–6.9)
ERYTHROCYTE [DISTWIDTH] IN BLOOD BY AUTOMATED COUNT: 45.1 FL (ref 35.9–50)
ERYTHROCYTE [SEDIMENTATION RATE] IN BLOOD BY WESTERGREN METHOD: 30 MM/HOUR (ref 0–25)
GLOBULIN SER CALC-MCNC: 3.7 G/DL (ref 1.9–3.5)
GLUCOSE SERPL-MCNC: 93 MG/DL (ref 65–99)
GLUCOSE SERPL-MCNC: 94 MG/DL (ref 65–99)
HCT VFR BLD AUTO: 42.8 % (ref 37–47)
HGB BLD-MCNC: 13.9 G/DL (ref 12–16)
IMM GRANULOCYTES # BLD AUTO: 0.03 K/UL (ref 0–0.11)
IMM GRANULOCYTES NFR BLD AUTO: 0.4 % (ref 0–0.9)
LYMPHOCYTES # BLD AUTO: 0.97 K/UL (ref 1–4.8)
LYMPHOCYTES NFR BLD: 12.5 % (ref 22–41)
MAGNESIUM SERPL-MCNC: 1.7 MG/DL (ref 1.5–2.5)
MCH RBC QN AUTO: 30.8 PG (ref 27–33)
MCHC RBC AUTO-ENTMCNC: 32.5 G/DL (ref 33.6–35)
MCV RBC AUTO: 94.7 FL (ref 81.4–97.8)
MONOCYTES # BLD AUTO: 0.55 K/UL (ref 0–0.85)
MONOCYTES NFR BLD AUTO: 7.1 % (ref 0–13.4)
NEUTROPHILS # BLD AUTO: 6.06 K/UL (ref 2–7.15)
NEUTROPHILS NFR BLD: 78 % (ref 44–72)
NRBC # BLD AUTO: 0 K/UL
NRBC BLD-RTO: 0 /100 WBC
PLATELET # BLD AUTO: 190 K/UL (ref 164–446)
PMV BLD AUTO: 11.1 FL (ref 9–12.9)
POTASSIUM SERPL-SCNC: 3.9 MMOL/L (ref 3.6–5.5)
POTASSIUM SERPL-SCNC: 4 MMOL/L (ref 3.6–5.5)
PROT SERPL-MCNC: 7.9 G/DL (ref 6–8.2)
RBC # BLD AUTO: 4.52 M/UL (ref 4.2–5.4)
SODIUM SERPL-SCNC: 138 MMOL/L (ref 135–145)
SODIUM SERPL-SCNC: 139 MMOL/L (ref 135–145)
TSH SERPL DL<=0.005 MIU/L-ACNC: 1.14 UIU/ML (ref 0.38–5.33)
WBC # BLD AUTO: 7.8 K/UL (ref 4.8–10.8)

## 2021-10-12 PROCEDURE — 82784 ASSAY IGA/IGD/IGG/IGM EACH: CPT

## 2021-10-12 PROCEDURE — 84165 PROTEIN E-PHORESIS SERUM: CPT

## 2021-10-12 PROCEDURE — 83516 IMMUNOASSAY NONANTIBODY: CPT

## 2021-10-12 PROCEDURE — 36415 COLL VENOUS BLD VENIPUNCTURE: CPT

## 2021-10-12 PROCEDURE — 80053 COMPREHEN METABOLIC PANEL: CPT

## 2021-10-12 PROCEDURE — 85652 RBC SED RATE AUTOMATED: CPT

## 2021-10-12 PROCEDURE — 84443 ASSAY THYROID STIM HORMONE: CPT

## 2021-10-12 PROCEDURE — 85025 COMPLETE CBC W/AUTO DIFF WBC: CPT

## 2021-10-12 PROCEDURE — 82785 ASSAY OF IGE: CPT

## 2021-10-12 PROCEDURE — 83735 ASSAY OF MAGNESIUM: CPT

## 2021-10-12 PROCEDURE — 80048 BASIC METABOLIC PNL TOTAL CA: CPT

## 2021-10-12 PROCEDURE — 86255 FLUORESCENT ANTIBODY SCREEN: CPT

## 2021-10-12 PROCEDURE — 86140 C-REACTIVE PROTEIN: CPT

## 2021-10-12 PROCEDURE — 84155 ASSAY OF PROTEIN SERUM: CPT | Mod: XU

## 2021-10-12 NOTE — PROGRESS NOTES
CC: Jana Overton is a 66 y.o. female is suffering from   Chief Complaint   Patient presents with   • Hospital Follow-up   • Medication Refill     pt needs a new epi pen   • Edema     bilateral leg swelling         SUBJECTIVE:  1. Abnormality of globulin  Patient is here for follow-up, we reviewed labs from [facility].  Patient and I have discussed that she has abnormalities with her globulin of recommended SPEP UPEP to be completed    2. Dysphagia, unspecified type  History of dysphagia with questionable eosinophils on biopsy, query possible problems with eosinophilic esophagitis    3. Vasculitis (HCC)  Patient with lower extremity pain discomfort etiology uncertain suspicious for vasculitis    4. Need for immunization against influenza  Vaccination given        Past social, family, history:   Social History     Tobacco Use   • Smoking status: Never Smoker   • Smokeless tobacco: Never Used   • Tobacco comment: second hand smoke   Vaping Use   • Vaping Use: Never used   Substance Use Topics   • Alcohol use: Not Currently     Alcohol/week: 0.6 oz     Types: 1 Shots of liquor per week     Comment: gin and half a lime; tonic water   • Drug use: Not Currently     Comment: medical marijuana through bong         MEDICATIONS:    Current Outpatient Medications:   •  metoprolol tartrate (LOPRESSOR) 50 MG Tab, TAKE 1 TABLET BY MOUTH TWICE DAILY, HOLD IF BLOOD PRESSURE LESS THAN 95/50, Disp: 180 Tablet, Rfl: 2  •  nystatin (MYCOSTATIN) 112747 UNIT/ML Suspension, TAKE 5ML BY MOUTH FOUR TIMES A DAY, Disp: 120 mL, Rfl: 1  •  prochlorperazine (COMPAZINE) 10 MG Tab, Take 1 Tablet by mouth 1 time a day as needed., Disp: 10 Tablet, Rfl: 0  •  ondansetron (ZOFRAN ODT) 4 MG TABLET DISPERSIBLE, Take 1-2 Tablets by mouth every 8 hours as needed for Nausea., Disp: 20 Tablet, Rfl: 3  •  spironolactone (ALDACTONE) 25 MG Tab, Take 2 Tablets by mouth every day., Disp: 180 tablet, Rfl: 3  •  XHANCE 93 MCG/ACT  Exhaler Suspension, Administer 1 Spray into affected nostril(S) 2 (two) times a day., Disp: , Rfl:   •  potassium chloride ER (KLOR-CON) 10 MEQ tablet, Take 10 mEq by mouth 1 time a day as needed (with Torsemide)., Disp: , Rfl:   •  oxyCODONE immediate release (ROXICODONE) 10 MG immediate release tablet, Take 10 mg by mouth every 6 hours. Indications: Chronic Pain, Disp: , Rfl:   •  torsemide (DEMADEX) 10 MG tablet, Take 10-20 mg by mouth 1 time a day as needed (by weight)., Disp: , Rfl:   •  Probiotic Product (PROBIOTIC PO), Take 1 Cap by mouth every evening., Disp: , Rfl:   •  diazePAM (VALIUM) 5 MG Tab, Take 5 mg by mouth every 6 hours as needed (for muscle spasms)., Disp: , Rfl:     PROBLEMS:  Patient Active Problem List    Diagnosis Date Noted   • Migraine 06/04/2019   • Essential hypertension 05/20/2019   • DVT (deep venous thrombosis) ([facility]) 12/31/2018   • History of MRSA infection 12/29/2018   • History of infection with vancomycin resistant Enterococcus (VRE) 12/29/2018   • 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[facility]) 12/28/2018   • Dysphasia 12/28/2018   • Anemia 12/15/2018   • Thrush of mouth and esophagus ([facility]) 12/13/2018   • Liver enzyme elevation 12/12/2018   • Leukocytosis 12/12/2018   • Chronic respiratory failure with hypoxia ([facility]) 03/20/2018   • Anxiety disorder due to multiple medical problems 12/01/2017   • DDD (degenerative disc disease), lumbar 04/12/2017   • History of DVT (deep vein thrombosis) 11/23/2016   • Paroxysmal atrial fibrillation ([facility]) 03/26/2015   • Sleep apnea 10/26/2014   • Postherpetic neuralgia 06/24/2014   • Multiple falls 06/24/2014   • COPD (chronic obstructive pulmonary disease) ([facility]) 06/24/2014   • Rosacea 06/24/2014   • Ileostomy in place ([facility]) 06/26/2013   • GERD (gastroesophageal reflux disease) 12/05/2012   • Status post lumbar surgery 07/16/2012   • Crohn's disease of small intestine with complication ([facility]) 09/23/2009   • Chronic pain 09/23/2009   • Opioid type dependence, continuous  ([facility]) 09/23/2009       REVIEW OF SYSTEMS:  Gen.:  No Nausea, Vomiting, fever, Chills.  Heart: No chest pain.  Lungs:  No shortness of Breath.  Psychological: Rg unusual Anxiety depression     PHYSICAL EXAM   Constitutional: Alert, cooperative, not in acute distress.  Cardiovascular:  Rate Rhythm is regular without murmurs rubs clicks.     Thorax & Lungs: Clear to auscultation, no wheezing, rhonchi, or rales  HENT: Normocephalic, Atraumatic.  Eyes: PERRLA, EOMI, Conjunctiva normal.   Neck: Trachia is midline no swelling of the thyroid.   Lymphatic: No lymphadenopathy noted.   Musculoskeletal: Possible palpable purpura  Neurologic: Alert & oriented x 3, cranial nerves II through XII are intact, Normal motor function, Normal sensory function, No focal deficits noted.   Psychiatric: Affect normal, Judgment normal, Mood normal.     VITAL SIGNS:BP (!) 170/60   Pulse (!) 128   Temp 36 °C (96.8 °F) (Temporal)   Resp 16   Ht 1.829 m (6')   Wt 71.7 kg (158 lb)   SpO2 96%   BMI 21.43 kg/m²     Labs: Reviewed    Assessment:                                                     Plan:    1. Abnormality of globulin  Check SPEP UPEP  - SPEP W/REFLEX TO CASSY, A, G, M; Future  - PROTEIN ELECTROPHORESIS 24 HR URINE; Future  - Sed Rate; Future  - CRP QUANTITIVE (NON-CARDIAC); Future  - Anti-Neutrophil Cytoplasmic Antibodies Screen; Future  - IGA SERUM QUANT; Future  - CBC WITH DIFFERENTIAL; Future  - Comp Metabolic Panel; Future    2. Dysphagia, unspecified type  Consider eosinophilic esophagitis  - IGE SERUM; Future  - Sed Rate; Future  - CRP QUANTITIVE (NON-CARDIAC); Future  - Anti-Neutrophil Cytoplasmic Antibodies Screen; Future  - IGA SERUM QUANT; Future  - CBC WITH DIFFERENTIAL; Future  - Comp Metabolic Panel; Future    3. Vasculitis ([facility])  Check for ANCA  - Sed Rate; Future  - CRP QUANTITIVE (NON-CARDIAC); Future  - Anti-Neutrophil Cytoplasmic Antibodies Screen; Future  - ANCA-ASSOCIATED VASCULITIS PROFILE  (ANCA/MPO/PR3); Future  - IGA SERUM QUANT; Future  - CBC WITH DIFFERENTIAL; Future  - Comp Metabolic Panel; Future    4. Need for immunization against influenza  Vaccination given  - INFLUENZA VACCINE, HIGH DOSE (65+ ONLY)

## 2021-10-14 ENCOUNTER — HOSPITAL ENCOUNTER (OUTPATIENT)
Facility: MEDICAL CENTER | Age: 66
End: 2021-10-14
Attending: INTERNAL MEDICINE
Payer: MEDICARE

## 2021-10-14 LAB
IGA SERPL-MCNC: 505 MG/DL (ref 68–408)
IGE SERPL-ACNC: <2 KU/L

## 2021-10-14 PROCEDURE — 86335 IMMUNFIX E-PHORSIS/URINE/CSF: CPT

## 2021-10-14 PROCEDURE — 84166 PROTEIN E-PHORESIS/URINE/CSF: CPT

## 2021-10-14 PROCEDURE — 84156 ASSAY OF PROTEIN URINE: CPT

## 2021-10-16 LAB
ALBUMIN SERPL ELPH-MCNC: 4.23 G/DL (ref 3.75–5.01)
ALPHA1 GLOB SERPL ELPH-MCNC: 0.31 G/DL (ref 0.19–0.46)
ALPHA2 GLOB SERPL ELPH-MCNC: 0.85 G/DL (ref 0.48–1.05)
B-GLOBULIN SERPL ELPH-MCNC: 1.19 G/DL (ref 0.48–1.1)
GAMMA GLOB SERPL ELPH-MCNC: 1.22 G/DL (ref 0.62–1.51)
INTERPRETATION SERPL IFE-IMP: ABNORMAL
MONOCLON BAND OBS SERPL: ABNORMAL
PATHOLOGY STUDY: ABNORMAL
PROT SERPL-MCNC: 7.8 G/DL (ref 6.3–8.2)

## 2021-10-18 DIAGNOSIS — D47.2 MONOCLONAL GAMMOPATHY OF UNKNOWN SIGNIFICANCE (MGUS): ICD-10-CM

## 2021-10-18 LAB
ANCA IFA PATTERN Q6048: NORMAL
ANCA IFA TITER Q6047: NORMAL
MYELOPEROXIDASE AB SER-ACNC: 2 AU/ML (ref 0–19)
PROTEINASE3 AB SER-ACNC: 1 AU/ML (ref 0–19)

## 2021-10-19 ENCOUNTER — TELEPHONE (OUTPATIENT)
Dept: MEDICAL GROUP | Facility: LAB | Age: 66
End: 2021-10-19

## 2021-10-19 DIAGNOSIS — R53.81 DEBILITY: ICD-10-CM

## 2021-10-19 NOTE — TELEPHONE ENCOUNTER
Eliz:  Please call Jana, the referral to physical therapy has been written.   Regards, Chase Charles, DO

## 2021-10-19 NOTE — TELEPHONE ENCOUNTER
1. Caller Name: Jana                         Call Back Number: [phone] (home)        How would the patient prefer to be contacted with a response: Phone call OK to leave a detailed message    Pt is having increased weakness and pain with discoloration in both feet.  She is using a walker right now.  She is asking for advice.      Requesting a referral to [facility] and PT with [facility].  Fax # [phone]

## 2021-10-20 LAB
ALBUMIN 24H MFR UR ELPH: 66.4 %
ALPHA1 GLOB 24H MFR UR ELPH: 1.8 %
ALPHA2 GLOB 24H MFR UR ELPH: 2.8 %
B-GLOBULIN 24H MFR UR ELPH: 16.9 %
COLLECT DURATION TIME SPEC: 24 HRS
EER MONOCLONAL PROTEIN STUDY, 24 HOUR U Q5964: ABNORMAL
GAMMA GLOB 24H MFR UR ELPH: 12.1 %
INTERPRETATION UR IFE-IMP: ABNORMAL
M PROTEIN 24H MFR UR ELPH: 0 %
M PROTEIN 24H UR ELPH-MRATE: 0 MG/24 HRS
PROT 24H UR-MRATE: 30 MG/D (ref 40–150)
PROT UR-MCNC: 6 MG/DL
SPECIMEN VOL ?TM UR: 500 ML

## 2021-10-25 ENCOUNTER — APPOINTMENT (OUTPATIENT)
Dept: MEDICAL GROUP | Facility: LAB | Age: 66
End: 2021-10-25
Payer: MEDICARE

## 2021-10-25 ENCOUNTER — OFFICE VISIT (OUTPATIENT)
Dept: MEDICAL GROUP | Facility: LAB | Age: 66
End: 2021-10-25
Payer: MEDICARE

## 2021-10-25 VITALS
WEIGHT: 153 LBS | HEART RATE: 120 BPM | HEIGHT: 72 IN | RESPIRATION RATE: 14 BRPM | BODY MASS INDEX: 20.72 KG/M2 | SYSTOLIC BLOOD PRESSURE: 154 MMHG | DIASTOLIC BLOOD PRESSURE: 80 MMHG | TEMPERATURE: 95.9 F | OXYGEN SATURATION: 94 %

## 2021-10-25 DIAGNOSIS — M32.9 SYSTEMIC LUPUS ERYTHEMATOSUS, UNSPECIFIED SLE TYPE, UNSPECIFIED ORGAN INVOLVEMENT STATUS (HCC): ICD-10-CM

## 2021-10-25 DIAGNOSIS — D47.2 IGA MONOCLONAL GAMMOPATHY OF UNCERTAIN SIGNIFICANCE: ICD-10-CM

## 2021-10-25 DIAGNOSIS — K50.019 CROHN'S DISEASE OF SMALL INTESTINE WITH COMPLICATION (HCC): ICD-10-CM

## 2021-10-25 PROCEDURE — 99214 OFFICE O/P EST MOD 30 MIN: CPT | Performed by: INTERNAL MEDICINE

## 2021-10-25 RX ORDER — BACLOFEN 10 MG/1
TABLET ORAL
COMMUNITY
Start: 2021-10-20 | End: 2021-12-07

## 2021-10-25 ASSESSMENT — FIBROSIS 4 INDEX: FIB4 SCORE: 2.18

## 2021-10-26 ENCOUNTER — HOSPITAL ENCOUNTER (OUTPATIENT)
Dept: LAB | Facility: MEDICAL CENTER | Age: 66
End: 2021-10-26
Attending: INTERNAL MEDICINE
Payer: MEDICARE

## 2021-10-26 DIAGNOSIS — M32.9 SYSTEMIC LUPUS ERYTHEMATOSUS, UNSPECIFIED SLE TYPE, UNSPECIFIED ORGAN INVOLVEMENT STATUS (HCC): ICD-10-CM

## 2021-10-26 PROCEDURE — 36415 COLL VENOUS BLD VENIPUNCTURE: CPT

## 2021-10-26 PROCEDURE — 86147 CARDIOLIPIN ANTIBODY EA IG: CPT | Mod: 91

## 2021-10-26 NOTE — PROGRESS NOTES
CC: Jana Oevrton is a 66 y.o. female is suffering from   Chief Complaint   Patient presents with   • Follow-Up     2 weeks follow up         SUBJECTIVE:  1. Systemic lupus erythematosus, unspecified SLE type, unspecified organ involvement status (HCC)  Jana is here today with Goyo, we have discussed my suspicion that she is suffering from systemic lupus erythematosus.  Patient has positive VAHID, has a history of a malar rash, I have asked her to photograph it, is having problems with oral ulcers, has a previous history of Crohn's disease, all suspicious for the diagnosis.  Will check anticardiolipin antibodies    2. Crohn's disease of small intestine with complication (HCC)  History of Crohn's disease of the small intestine status post colectomy    3. IgA monoclonal gammopathy of uncertain significance  Probable IgA monoclonal gammopathy to follow-up with oncology        Past social, family, history: , Goyo  Social History     Tobacco Use   • Smoking status: Never Smoker   • Smokeless tobacco: Never Used   • Tobacco comment: second hand smoke   Vaping Use   • Vaping Use: Never used   Substance Use Topics   • Alcohol use: Not Currently     Alcohol/week: 0.6 oz     Types: 1 Shots of liquor per week     Comment: gin and half a lime; tonic water   • Drug use: Not Currently     Comment: medical marijuana through bong         MEDICATIONS:    Current Outpatient Medications:   •  baclofen (LIORESAL) 10 MG Tab, TAKE 1 TABLET BY MOUTH DAILY AS NEEDED FOR MUSCLE PAIN OR SPASMS, Disp: , Rfl:   •  metoprolol tartrate (LOPRESSOR) 50 MG Tab, TAKE 1 TABLET BY MOUTH TWICE DAILY, HOLD IF BLOOD PRESSURE LESS THAN 95/50, Disp: 180 Tablet, Rfl: 2  •  nystatin (MYCOSTATIN) 953094 UNIT/ML Suspension, TAKE 5ML BY MOUTH FOUR TIMES A DAY, Disp: 120 mL, Rfl: 1  •  prochlorperazine (COMPAZINE) 10 MG Tab, Take 1 Tablet by mouth 1 time a day as needed., Disp: 10 Tablet, Rfl: 0  •  ondansetron (ZOFRAN ODT) 4 MG TABLET DISPERSIBLE, Take  1-2 Tablets by mouth every 8 hours as needed for Nausea., Disp: 20 Tablet, Rfl: 3  •  spironolactone (ALDACTONE) 25 MG Tab, Take 2 Tablets by mouth every day., Disp: 180 tablet, Rfl: 3  •  XHANCE 93 MCG/ACT Exhaler Suspension, Administer 1 Spray into affected nostril(S) 2 (two) times a day., Disp: , Rfl:   •  potassium chloride ER (KLOR-CON) 10 MEQ tablet, Take 10 mEq by mouth 1 time a day as needed (with Torsemide)., Disp: , Rfl:   •  oxyCODONE immediate release (ROXICODONE) 10 MG immediate release tablet, Take 10 mg by mouth every 6 hours. Indications: Chronic Pain, Disp: , Rfl:   •  torsemide (DEMADEX) 10 MG tablet, Take 10-20 mg by mouth 1 time a day as needed (by weight)., Disp: , Rfl:   •  Probiotic Product (PROBIOTIC PO), Take 1 Cap by mouth every evening., Disp: , Rfl:   •  diazePAM (VALIUM) 5 MG Tab, Take 5 mg by mouth every 6 hours as needed (for muscle spasms)., Disp: , Rfl:     PROBLEMS:  Patient Active Problem List    Diagnosis Date Noted   • Migraine 06/04/2019   • Essential hypertension 05/20/2019   • DVT (deep venous thrombosis) ([facility]) 12/31/2018   • History of MRSA infection 12/29/2018   • History of infection with vancomycin resistant Enterococcus (VRE) 12/29/2018   • 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[facility]) 12/28/2018   • Dysphasia 12/28/2018   • Anemia 12/15/2018   • Thrush of mouth and esophagus ([facility]) 12/13/2018   • Liver enzyme elevation 12/12/2018   • Leukocytosis 12/12/2018   • Chronic respiratory failure with hypoxia ([facility]) 03/20/2018   • Anxiety disorder due to multiple medical problems 12/01/2017   • DDD (degenerative disc disease), lumbar 04/12/2017   • History of DVT (deep vein thrombosis) 11/23/2016   • Paroxysmal atrial fibrillation ([facility]) 03/26/2015   • Sleep apnea 10/26/2014   • Postherpetic neuralgia 06/24/2014   • Multiple falls 06/24/2014   • COPD (chronic obstructive pulmonary disease) ([facility]) 06/24/2014   • Rosacea 06/24/2014   • Ileostomy in place ([facility]) 06/26/2013   • GERD (gastroesophageal reflux  disease) 12/05/2012   • Status post lumbar surgery 07/16/2012   • Crohn's disease of small intestine with complication ([facility]) 09/23/2009   • Chronic pain 09/23/2009   • Opioid type dependence, continuous ([facility]) 09/23/2009       REVIEW OF SYSTEMS:  Gen.:  No Nausea, Vomiting, fever, Chills.  Heart: No chest pain.  Lungs:  No shortness of Breath.  Psychological: Rg unusual Anxiety depression     PHYSICAL EXAM   Constitutional: Alert, cooperative, not in acute distress.  Cardiovascular:  Rate Rhythm is regular without murmurs rubs clicks.     Thorax & Lungs: Clear to auscultation, no wheezing, rhonchi, or rales  HENT: Normocephalic, Atraumatic.  Eyes: PERRLA, EOMI, Conjunctiva normal.   Neck: Trachia is midline no swelling of the thyroid.   Lymphatic: No lymphadenopathy noted.   Neurologic: Alert & oriented x 3, cranial nerves II through XII are intact, Normal motor function, Normal sensory function.   Psychiatric: Affect normal, Judgment normal, Mood normal.     VITAL SIGNS:/80   Pulse (!) 120   Temp (!) 35.5 °C (95.9 °F) (Temporal)   Resp 14   Ht 1.829 m (6')   Wt 69.4 kg (153 lb)   SpO2 94%   BMI 20.75 kg/m²     Labs: Reviewed    Assessment:                                                     Plan:    1. Systemic lupus erythematosus, unspecified SLE type, unspecified organ involvement status (HCC)  Check anticardiolipin antibodies  - ANTICARDIOLIPIN AB IGG,IGM,IGA; Future    2. Crohn's disease of small intestine with complication (HCC)  Crohn's disease status post colectomy    3. IgA monoclonal gammopathy of uncertain significance  Follow-up with oncology suspected IgA monoclonal gammopathy.

## 2021-10-28 ENCOUNTER — OFFICE VISIT (OUTPATIENT)
Dept: HEMATOLOGY ONCOLOGY | Facility: MEDICAL CENTER | Age: 66
End: 2021-10-28
Payer: MEDICARE

## 2021-10-28 VITALS
WEIGHT: 148.7 LBS | TEMPERATURE: 99.2 F | OXYGEN SATURATION: 95 % | RESPIRATION RATE: 17 BRPM | HEIGHT: 72 IN | SYSTOLIC BLOOD PRESSURE: 148 MMHG | HEART RATE: 96 BPM | BODY MASS INDEX: 20.14 KG/M2 | DIASTOLIC BLOOD PRESSURE: 70 MMHG

## 2021-10-28 DIAGNOSIS — D47.2 IGA GAMMOPATHY: ICD-10-CM

## 2021-10-28 LAB
CARDIOLIPIN IGA SER IA-ACNC: 24 APL (ref 0–11)
CARDIOLIPIN IGG SER IA-ACNC: 19 GPL (ref 0–14)
CARDIOLIPIN IGM SER IA-ACNC: <10 MPL (ref 0–12)

## 2021-10-28 PROCEDURE — 99214 OFFICE O/P EST MOD 30 MIN: CPT | Performed by: NURSE PRACTITIONER

## 2021-10-28 RX ORDER — POTASSIUM CHLORIDE 750 MG/1
10 TABLET, FILM COATED, EXTENDED RELEASE ORAL
COMMUNITY
End: 2021-10-28

## 2021-10-28 RX ORDER — TORSEMIDE 10 MG/1
10-20 TABLET ORAL
COMMUNITY
End: 2021-10-28

## 2021-10-28 RX ORDER — AMOXICILLIN AND CLAVULANATE POTASSIUM 500; 125 MG/1; MG/1
TABLET, FILM COATED ORAL
COMMUNITY
Start: 2021-08-04 | End: 2021-10-28

## 2021-10-28 RX ORDER — ONDANSETRON 4 MG/1
4 TABLET, FILM COATED ORAL
COMMUNITY
Start: 2021-09-15 | End: 2021-12-07

## 2021-10-28 RX ORDER — EPINEPHRINE 0.3 MG/.3ML
0.3 INJECTION SUBCUTANEOUS ONCE
Status: ON HOLD | COMMUNITY
Start: 2021-08-29 | End: 2022-05-22

## 2021-10-28 RX ORDER — NITROFURANTOIN 25; 75 MG/1; MG/1
CAPSULE ORAL
COMMUNITY
Start: 2021-08-29 | End: 2021-10-28

## 2021-10-28 RX ORDER — NALOXONE HYDROCHLORIDE 4 MG/.1ML
SPRAY NASAL
COMMUNITY
Start: 2021-09-15 | End: 2021-10-28

## 2021-10-28 RX ORDER — EPINEPHRINE 0.3 MG/.3ML
0.3 INJECTION SUBCUTANEOUS
COMMUNITY
End: 2021-10-28

## 2021-10-28 RX ORDER — FLUCONAZOLE 40 MG/ML
POWDER, FOR SUSPENSION ORAL
Status: ON HOLD | COMMUNITY
Start: 2021-09-17 | End: 2021-12-08

## 2021-10-28 RX ORDER — ONDANSETRON 4 MG/1
TABLET, FILM COATED ORAL
COMMUNITY
Start: 2021-08-29 | End: 2021-10-28

## 2021-10-28 RX ORDER — NALOXONE HYDROCHLORIDE 4 MG/.1ML
4 SPRAY NASAL PRN
Status: ON HOLD | COMMUNITY
Start: 2021-09-15 | End: 2022-05-22

## 2021-10-28 RX ORDER — OXYCODONE HYDROCHLORIDE 10 MG/1
10 TABLET ORAL
COMMUNITY
End: 2021-10-28

## 2021-10-28 ASSESSMENT — ENCOUNTER SYMPTOMS
PALPITATIONS: 1
DIARRHEA: 1
CONSTIPATION: 0
SHORTNESS OF BREATH: 1
CHILLS: 0
MYALGIAS: 1
DIZZINESS: 1
VOMITING: 1
NAUSEA: 0
FEVER: 0
WEIGHT LOSS: 1
COUGH: 0
INSOMNIA: 1

## 2021-10-28 ASSESSMENT — PAIN SCALES - GENERAL: PAINLEVEL: 7=MODERATE-SEVERE PAIN

## 2021-10-28 ASSESSMENT — FIBROSIS 4 INDEX: FIB4 SCORE: 2.18

## 2021-10-28 NOTE — PROGRESS NOTES
"Subjective     Jana JONE Overton is a 66 y.o. female who presents with New Patient (IC New/MGUS)          HPI    Patient referred to me, Intake Oncology Coordinator by her PCP Dr. Charles for abnormal SPEP.  Patient is accompanied by her  for today's visit.      Patient recently had labs completed by primary care provider.  It was noted that she did have a slightly elevated globulin level.  Therefore further labs completed including SPEP and UPEP.  SPEP was showed to have a normal SPEP pattern but immunofixation was not completed.  She did have an IgA completed which was slightly elevated at 505.  UPEP completed showed no M protein detected and the immunofixation was negative for any monoclonal free light chains.  There is no evidence of anemia, kidney dysfunction or abnormal calcium levels.  I reviewed these labs in detail with patient and her  today.    Patient with multiple clinical complaints.  She does have multiple medical conditions that is currently being worked up by PCP.  Please review ROS down below.    Please see past medical and surgical history below.    Patient essentially is a never smoker.  She stated she smoked for approximately 1 year many many years ago.  She does smoke medical marijuana very intermittently.    She does have a family history maternal aunt of breast cancer.    Allergies   Allergen Reactions   • Amitriptyline Unspecified     Nightmares     • Azathioprine Sodium Hives and Shortness of Breath   • Infliximab Hives, Shortness of Breath and Rash     .   • Methotrexate Hives, Shortness of Breath and Rash     .   • Methotrexate Hives, Rash and Shortness of Breath     .   • Morphine Shortness of Breath, Swelling and Palpitations   • Promethazine Shortness of Breath and Rash     .   • Roxanol [Morphine Sulfate] Swelling     Throat swells   • Carafate [Sucralfate] Vomiting and Nausea     Generalizes/Mouth Sores   • Demerol Vomiting   • Duragesic [Fentanyl]      \"Patches didn't " "work\"  Skin broke down   • Fentanyl Unspecified     Patches caused skin breakdown, no pain relief   • Lorazepam Unspecified     States give me nightmares.    • Meperidine Vomiting   • Methadone Unspecified     Didn't work   • Methadone Unspecified     Didn't work   • Other Drug Unspecified     steroids   • Pregabalin Rash     Rash     • Pseudoephedrine Vomiting   • Sulfa Drugs Hives and Rash     .  .   • Betamethasone Unspecified     Pt unable to remember   • Celestone [Betamethasone Sodium Phosphate] Unspecified     Pt unable to remember   • Sulfasalazine Rash     On chest   • Tizanidine Unspecified     Pt unable to remember   • Tizanidine Hydrochloride Unspecified     Pt unable to remember     Current Outpatient Medications on File Prior to Visit   Medication Sig Dispense Refill   • EPINEPHrine (EPIPEN) 0.3 MG/0.3ML Solution Auto-injector solution for injection INJECT INTO THE MUSCLE EVERY 10 MINUTES AS NEEDED FOR ANAPHYLAXSIS     • baclofen (LIORESAL) 10 MG Tab TAKE 1 TABLET BY MOUTH DAILY AS NEEDED FOR MUSCLE PAIN OR SPASMS     • metoprolol tartrate (LOPRESSOR) 50 MG Tab TAKE 1 TABLET BY MOUTH TWICE DAILY, HOLD IF BLOOD PRESSURE LESS THAN 95/50 180 Tablet 2   • prochlorperazine (COMPAZINE) 10 MG Tab Take 1 Tablet by mouth 1 time a day as needed. 10 Tablet 0   • ondansetron (ZOFRAN ODT) 4 MG TABLET DISPERSIBLE Take 1-2 Tablets by mouth every 8 hours as needed for Nausea. 20 Tablet 3   • spironolactone (ALDACTONE) 25 MG Tab Take 2 Tablets by mouth every day. 180 tablet 3   • potassium chloride ER (KLOR-CON) 10 MEQ tablet Take 10 mEq by mouth 1 time a day as needed (with Torsemide).     • oxyCODONE immediate release (ROXICODONE) 10 MG immediate release tablet Take 10 mg by mouth every 6 hours. Indications: Chronic Pain     • torsemide (DEMADEX) 10 MG tablet Take 10-20 mg by mouth 1 time a day as needed (by weight).     • Probiotic Product (PROBIOTIC PO) Take 1 Cap by mouth every evening.     • EPINEPHrine (EPIPEN) " 0.3 MG/0.3ML Solution Auto-injector solution for injection Inject 0.3 mg into the shoulder, thigh, or buttocks. (Patient not taking: Reported on 10/28/2021)     • nitrofurantoin (MACROBID) 100 MG Cap TAKE 1 CAPSULE BY MOUTH EVERY 12 HOURS FOR 7 DAYS - TAKE WITH FOOD (Patient not taking: Reported on 10/28/2021)     • amoxicillin-clavulanate (AUGMENTIN) 500-125 MG Tab  (Patient not taking: Reported on 10/28/2021)     • fluconazole (DIFLUCAN) 40 MG/ML suspension  (Patient not taking: Reported on 10/28/2021)     • Naloxone (NARCAN) 4 MG/0.1ML Liquid Administer 4 mg into affected nostril(S). (Patient not taking: Reported on 10/28/2021)     • NARCAN 4 MG/0.1ML Liquid PLACE 1 SPRAY IN ONE NOSTRIL AS NEEDED FOR REVERSAL REPEAT WITH SECOND DEVICE IN OTHER NOSTRIL AFTER 2-3 MINUTES IF NO OR MINIMAL RESPONSE (Patient not taking: Reported on 10/28/2021)     • ondansetron (ZOFRAN) 4 MG Tab tablet Take 4 mg by mouth. (Patient not taking: Reported on 10/28/2021)     • ondansetron (ZOFRAN) 4 MG Tab tablet TAKE 1 TABLET BY MOUTH EVERY 6 HOURS AS NEEDED FOR NAUSEA OR VOMITING (Patient not taking: Reported on 10/28/2021)     • oxyCODONE immediate release (ROXICODONE) 10 MG immediate release tablet Take 10 mg by mouth. (Patient not taking: Reported on 10/28/2021)     • potassium chloride ER (KLOR-CON) 10 MEQ tablet Take 10 mEq by mouth. (Patient not taking: Reported on 10/28/2021)     • torsemide (DEMADEX) 10 MG tablet Take 10-20 mg by mouth. (Patient not taking: Reported on 10/28/2021)     • nystatin (MYCOSTATIN) 378284 UNIT/ML Suspension TAKE 5ML BY MOUTH FOUR TIMES A DAY (Patient not taking: Reported on 10/28/2021) 120 mL 1   • XHANCE 93 MCG/ACT Exhaler Suspension Administer 1 Spray into affected nostril(S) 2 (two) times a day.       No current facility-administered medications on file prior to visit.     Past Medical History:   Diagnosis Date   • Anesthesia     difficult Iv stick   • Anxiety    • Arthritis     all over   • ASTHMA     • Atrial fib/flut    • Backpain    • Breath shortness    • Bronchitis     5 years   • Chronic pain    • Colostomy in place ([facility]) 10/14/2010   • COPD (chronic obstructive pulmonary disease) ([facility]) 6/24/2014   • COPD (chronic obstructive pulmonary disease) ([facility]) 6/24/2014   • Crohn's disease of colon ([facility])    • Dyspnea    • History of cardiac murmur    • Hypokalemia 6/24/2014   • Indigestion    • Infectious disease     MRSA, VancoRSA   • Multiple falls 6/24/2014   • Narcotic dependence ([facility]) 9/23/2009   • Obstruction    • Other specified disorder of intestines     crohns disease   • Pain 7/11/13    all over   • Pneumonia     child and mid 30's   • Postherpetic neuralgia 6/24/2014   • Rosacea 6/24/2014   • Sleep apnea      Past Surgical History:   Procedure Laterality Date   • ILEOSTOMY  1/20/2021    Procedure: ILEOSTOMY- COMPLEX REVISION,;  Surgeon: Kevin Cruz M.D.;  Location: [facility];  Service: General   • LYSIS ADHESIONS GENERAL  1/20/2021    Procedure: LYSIS, ADHESIONS;  Surgeon: Kevin Cruz M.D.;  Location: [facility];  Service: General   • GASTROSCOPY N/A 5/22/2019    Procedure: GASTROSCOPY - WITH DILATION;  Surgeon: Dionicio Cardona M.D.;  Location: [facility];  Service: Gastroenterology   • GASTROSCOPY  1/6/2019    Procedure: GASTROSCOPY- WITH DILATION;  Surgeon: Daniel Daniels M.D.;  Location: [facility];  Service: Gastroenterology   • ILEOSTOMY  12/29/2018    Procedure: ILEOSTOMY- REVISION;  Surgeon: Kevin Cruz M.D.;  Location: [facility];  Service: General   • SIGMOIDOSCOPY FLEX  12/29/2018    Procedure: SIGMOIDOSCOPY FLEX;  Surgeon: Kevin Cruz M.D.;  Location: [facility];  Service: General   • EXPLORATORY LAPAROTOMY  12/29/2018    Procedure: EXPLORATORY LAPAROTOMY, lysis of adhesions;  Surgeon: Kevin Cruz M.D.;  Location: [facility];  Service: General   • ILEOSTOMY  5/14/2014    Performed by Kevin ALVAREZ  ROXANNE Cruz at SURGERY [facility] ORS   • MAMMOPLASTY REDUCTION  7/17/2013    Performed by Janey Burt M.D. at SURGERY [facility] ORS   • HARDWARE REMOVAL NEURO  7/16/2012    Performed by KEELEY KIM at SURGERY [facility] ORS   • GASTROSCOPY  10/4/2011    Performed by TYSON MARTINEZ at SURGERY SAME DAY [facility] ORS   • COLONOSCOPY  10/4/2011    Performed by TYSON MARTINEZ at SURGERY SAME DAY [facility] ORS   • DILATION AND CURETTAGE  9/24/2010    Performed by GAURAV ALY at SURGERY SAME DAY [facility] ORS   • ILEOSTOMY  11/11/2009    Performed by SYDNEE CRUZ at SURGERY [facility] ORS   • GASTROSCOPY WITH BIOPSY  11/22/08    Performed by NEGRITA HUYNH at SURGERY [facility] ORS   • ABDOMINAL EXPLORATION     • CERVICAL DISK AND FUSION ANTERIOR     • COLON RESECTION     • FOOT SURGERY     • HAND SURGERY     • LUMBAR FUSION ANTERIOR     • LUMPECTOMY     • OTHER ABDOMINAL SURGERY      surgery for chrons disease   • VT REDUCTION OF BREAST       Family History   Problem Relation Age of Onset   • Lung Disease Mother         copd   • Diabetes Father    • Heart Disease Father    • Diabetes Sister    • Cancer Maternal Aunt 40        breast     Social History     Socioeconomic History   • Marital status:      Spouse name: Not on file   • Number of children: Not on file   • Years of education: Not on file   • Highest education level: Not on file   Occupational History   • Not on file   Tobacco Use   • Smoking status: Never Smoker   • Smokeless tobacco: Never Used   • Tobacco comment: second hand smoke parents - smoked for only 1 year many years ago   Vaping Use   • Vaping Use: Never used   Substance and Sexual Activity   • Alcohol use: Yes     Alcohol/week: 0.6 oz     Types: 1 Shots of liquor per week     Comment: gin and half a lime; tonic water   • Drug use: Yes     Types: Marijuana, Inhaled     Comment: medical marijuana through bong   • Sexual activity: Not on file     Comment:     Other Topics Concern   • Not on file   Social History Narrative   • Not on file     Social Determinants of Health     Financial Resource Strain:    • Difficulty of Paying Living Expenses:    Food Insecurity: No Food Insecurity   • Worried About Running Out of Food in the Last Year: Never true   • Ran Out of Food in the Last Year: Never true   Transportation Needs: No Transportation Needs   • Lack of Transportation (Medical): No   • Lack of Transportation (Non-Medical): No   Physical Activity:    • Days of Exercise per Week:    • Minutes of Exercise per Session:    Stress:    • Feeling of Stress :    Social Connections:    • Frequency of Communication with Friends and Family:    • Frequency of Social Gatherings with Friends and Family:    • Attends [employer] Services:    • Active Member of Clubs or Organizations:    • Attends Club or Organization Meetings:    • Marital Status:    Intimate Partner Violence:    • Fear of Current or Ex-Partner:    • Emotionally Abused:    • Physically Abused:    • Sexually Abused:            Review of Systems   Constitutional: Positive for malaise/fatigue and weight loss. Negative for chills and fever.        Fevers and chills wax and wane.  Lots of fatigue and ambulates as she can.    HENT: Positive for congestion (nasal drainage).         Swallowing issue with choking   Respiratory: Positive for shortness of breath. Negative for cough.    Cardiovascular: Positive for palpitations (a-fib feeling ). Negative for chest pain.        Monitoring her heart   Gastrointestinal: Positive for diarrhea and vomiting. Negative for constipation and nausea.        Ostomy in place for Crohn's    Genitourinary:        Incontinent   Musculoskeletal: Positive for myalgias.   Skin:        Skin issues - darken of skin on her feet   Neurological: Positive for dizziness.   Psychiatric/Behavioral: The patient has insomnia (broken sleep every 6 hours).               Objective     /70   Pulse  96   Temp 37.3 °C (99.2 °F) (Temporal)   Resp 17   Ht 1.829 m (6')   Wt 67.4 kg (148 lb 11.2 oz)   SpO2 95%   BMI 20.17 kg/m²        Physical Exam  Vitals reviewed.   Constitutional:       General: She is not in acute distress.     Appearance: She is not diaphoretic.   Cardiovascular:      Rate and Rhythm: Normal rate and regular rhythm.      Heart sounds: Normal heart sounds. No murmur heard.   No friction rub. No gallop.    Pulmonary:      Effort: Pulmonary effort is normal. No respiratory distress.      Comments: Diminished throughout  Musculoskeletal:         General: No swelling or tenderness.      Comments: Ambulates with a walker   Skin:     General: Skin is warm.   Neurological:      Mental Status: She is alert and oriented to person, place, and time.   Psychiatric:         Mood and Affect: Mood normal.         Behavior: Behavior normal.               Results for YONATHAN JUDD (MRN [id number])    Ref. Range 10/12/2021 12:08   WBC Latest Ref Range: 4.8 - 10.8 K/uL 7.8   RBC Latest Ref Range: 4.20 - 5.40 M/uL 4.52   Hemoglobin Latest Ref Range: 12.0 - 16.0 g/dL 13.9   Hematocrit Latest Ref Range: 37.0 - 47.0 % 42.8   MCV Latest Ref Range: 81.4 - 97.8 fL 94.7   MCH Latest Ref Range: 27.0 - 33.0 pg 30.8   MCHC Latest Ref Range: 33.6 - 35.0 g/dL 32.5 (L)   RDW Latest Ref Range: 35.9 - 50.0 fL 45.1   Platelet Count Latest Ref Range: 164 - 446 K/uL 190   MPV Latest Ref Range: 9.0 - 12.9 fL 11.1   Neutrophils-Polys Latest Ref Range: 44.00 - 72.00 % 78.00 (H)   Neutrophils (Absolute) Latest Ref Range: 2.00 - 7.15 K/uL 6.06   Lymphocytes Latest Ref Range: 22.00 - 41.00 % 12.50 (L)   Lymphs (Absolute) Latest Ref Range: 1.00 - 4.80 K/uL 0.97 (L)   Monocytes Latest Ref Range: 0.00 - 13.40 % 7.10   Monos (Absolute) Latest Ref Range: 0.00 - 0.85 K/uL 0.55   Eosinophils Latest Ref Range: 0.00 - 6.90 % 1.40   Eos (Absolute) Latest Ref Range: 0.00 - 0.51 K/uL 0.11   Basophils Latest Ref Range: 0.00 - 1.80 % 0.60   Baso  (Absolute) Latest Ref Range: 0.00 - 0.12 K/uL 0.05   Immature Granulocytes Latest Ref Range: 0.00 - 0.90 % 0.40   Immature Granulocytes (abs) Latest Ref Range: 0.00 - 0.11 K/uL 0.03   Nucleated RBC Latest Units: /100 WBC 0.00   NRBC (Absolute) Latest Units: K/uL 0.00   Sed Rate Westergren Latest Ref Range: 0 - 25 mm/hour 30 (H)   Sodium Latest Ref Range: 135 - 145 mmol/L 139   Potassium Latest Ref Range: 3.6 - 5.5 mmol/L 3.9   Chloride Latest Ref Range: 96 - 112 mmol/L 102   Co2 Latest Ref Range: 20 - 33 mmol/L 22   Anion Gap Latest Ref Range: 7.0 - 16.0  15.0   Glucose Latest Ref Range: 65 - 99 mg/dL 94   Bun Latest Ref Range: 8 - 22 mg/dL 14   Creatinine Latest Ref Range: 0.50 - 1.40 mg/dL 0.97   GFR If  Latest Ref Range: >60 mL/min/1.73 m 2 >60   GFR If Non  Latest Ref Range: >60 mL/min/1.73 m 2 57 (A)   Calcium Latest Ref Range: 8.4 - 10.2 mg/dL 10.0   AST(SGOT) Latest Ref Range: 12 - 45 U/L 22   ALT(SGPT) Latest Ref Range: 2 - 50 U/L 13   Alkaline Phosphatase Latest Ref Range: 30 - 99 U/L 104 (H)   Total Bilirubin Latest Ref Range: 0.1 - 1.5 mg/dL 0.6   Albumin Latest Ref Range: 3.2 - 4.9 g/dL 4.2   Total Protein Latest Ref Range: 6.0 - 8.2 g/dL 7.9   Globulin Latest Ref Range: 1.9 - 3.5 g/dL 3.7 (H)   A-G Ratio Latest Units: g/dL 1.1       Results for YONATHAN JUDD (MRN [id number])    Ref. Range 10/12/2021 12:08   Immunoglobulin A Latest Ref Range: 68 - 408 mg/dL 505 (H)                      Assessment & Plan         1. IgA gammopathy  Monoclonal Protein and FLC, Serum         In reviewing patient's lab results her SPEP is a normal SPEP pattern however immunofixation was not completed.  She does have a slightly elevated IgA at 505.  Her UPEP was completely normal with no M protein detected and negative for monoclonal free light chains.  However based on the IgA level being slightly elevated, I will go ahead and proceed with an immunofixation for further evaluation.  Discussed  the plan of care in detail with the patient today.  Follow-up with patient via phone with the lab results.  Patient did verbalize understanding's and agreed with the plan.      Please note that this dictation was created using voice recognition software. I have made every reasonable attempt to correct obvious errors, but I expect that there are errors of grammar and possibly content that I did not discover before finalizing the note.

## 2021-11-01 ENCOUNTER — HOSPITAL ENCOUNTER (OUTPATIENT)
Dept: LAB | Facility: MEDICAL CENTER | Age: 66
End: 2021-11-01
Attending: NURSE PRACTITIONER
Payer: MEDICARE

## 2021-11-01 DIAGNOSIS — D47.2 IGA GAMMOPATHY: ICD-10-CM

## 2021-11-01 PROCEDURE — 82784 ASSAY IGA/IGD/IGG/IGM EACH: CPT

## 2021-11-01 PROCEDURE — 83520 IMMUNOASSAY QUANT NOS NONAB: CPT | Mod: 91

## 2021-11-01 PROCEDURE — 86334 IMMUNOFIX E-PHORESIS SERUM: CPT

## 2021-11-01 PROCEDURE — 84155 ASSAY OF PROTEIN SERUM: CPT

## 2021-11-01 PROCEDURE — 36415 COLL VENOUS BLD VENIPUNCTURE: CPT

## 2021-11-01 PROCEDURE — 84165 PROTEIN E-PHORESIS SERUM: CPT

## 2021-11-04 ENCOUNTER — OFFICE VISIT (OUTPATIENT)
Dept: MEDICAL GROUP | Facility: LAB | Age: 66
End: 2021-11-04
Payer: MEDICARE

## 2021-11-04 VITALS
OXYGEN SATURATION: 95 % | WEIGHT: 154 LBS | HEIGHT: 72 IN | RESPIRATION RATE: 16 BRPM | TEMPERATURE: 97.9 F | HEART RATE: 88 BPM | BODY MASS INDEX: 20.86 KG/M2 | SYSTOLIC BLOOD PRESSURE: 140 MMHG | DIASTOLIC BLOOD PRESSURE: 70 MMHG

## 2021-11-04 DIAGNOSIS — Z01.419 WELL WOMAN EXAM: ICD-10-CM

## 2021-11-04 PROCEDURE — G0101 CA SCREEN;PELVIC/BREAST EXAM: HCPCS | Performed by: FAMILY MEDICINE

## 2021-11-04 SDOH — ECONOMIC STABILITY: INCOME INSECURITY: HOW HARD IS IT FOR YOU TO PAY FOR THE VERY BASICS LIKE FOOD, HOUSING, MEDICAL CARE, AND HEATING?: NOT VERY HARD

## 2021-11-04 SDOH — HEALTH STABILITY: PHYSICAL HEALTH: ON AVERAGE, HOW MANY MINUTES DO YOU ENGAGE IN EXERCISE AT THIS LEVEL?: 60 MIN

## 2021-11-04 SDOH — ECONOMIC STABILITY: TRANSPORTATION INSECURITY
IN THE PAST 12 MONTHS, HAS LACK OF RELIABLE TRANSPORTATION KEPT YOU FROM MEDICAL APPOINTMENTS, MEETINGS, WORK OR FROM GETTING THINGS NEEDED FOR DAILY LIVING?: NO

## 2021-11-04 SDOH — ECONOMIC STABILITY: HOUSING INSECURITY
IN THE LAST 12 MONTHS, WAS THERE A TIME WHEN YOU DID NOT HAVE A STEADY PLACE TO SLEEP OR SLEPT IN A SHELTER (INCLUDING NOW)?: NO

## 2021-11-04 SDOH — ECONOMIC STABILITY: HOUSING INSECURITY: IN THE LAST 12 MONTHS, HOW MANY PLACES HAVE YOU LIVED?: 1

## 2021-11-04 SDOH — ECONOMIC STABILITY: FOOD INSECURITY: WITHIN THE PAST 12 MONTHS, YOU WORRIED THAT YOUR FOOD WOULD RUN OUT BEFORE YOU GOT MONEY TO BUY MORE.: NEVER TRUE

## 2021-11-04 SDOH — ECONOMIC STABILITY: FOOD INSECURITY: WITHIN THE PAST 12 MONTHS, THE FOOD YOU BOUGHT JUST DIDN'T LAST AND YOU DIDN'T HAVE MONEY TO GET MORE.: NEVER TRUE

## 2021-11-04 SDOH — ECONOMIC STABILITY: INCOME INSECURITY: IN THE LAST 12 MONTHS, WAS THERE A TIME WHEN YOU WERE NOT ABLE TO PAY THE MORTGAGE OR RENT ON TIME?: NO

## 2021-11-04 SDOH — HEALTH STABILITY: PHYSICAL HEALTH: ON AVERAGE, HOW MANY DAYS PER WEEK DO YOU ENGAGE IN MODERATE TO STRENUOUS EXERCISE (LIKE A BRISK WALK)?: 7 DAYS

## 2021-11-04 SDOH — HEALTH STABILITY: MENTAL HEALTH
STRESS IS WHEN SOMEONE FEELS TENSE, NERVOUS, ANXIOUS, OR CAN'T SLEEP AT NIGHT BECAUSE THEIR MIND IS TROUBLED. HOW STRESSED ARE YOU?: NOT AT ALL

## 2021-11-04 ASSESSMENT — LIFESTYLE VARIABLES
HOW MANY STANDARD DRINKS CONTAINING ALCOHOL DO YOU HAVE ON A TYPICAL DAY: 1 OR 2
HOW OFTEN DO YOU HAVE SIX OR MORE DRINKS ON ONE OCCASION: NEVER
HOW OFTEN DO YOU HAVE A DRINK CONTAINING ALCOHOL: 2-3 TIMES A WEEK

## 2021-11-04 ASSESSMENT — SOCIAL DETERMINANTS OF HEALTH (SDOH)
HOW OFTEN DO YOU ATTEND CHURCH OR RELIGIOUS SERVICES?: MORE THAN 4 TIMES PER YEAR
IN A TYPICAL WEEK, HOW MANY TIMES DO YOU TALK ON THE PHONE WITH FAMILY, FRIENDS, OR NEIGHBORS?: MORE THAN THREE TIMES A WEEK
WITHIN THE PAST 12 MONTHS, YOU WORRIED THAT YOUR FOOD WOULD RUN OUT BEFORE YOU GOT THE MONEY TO BUY MORE: NEVER TRUE
HOW OFTEN DO YOU GET TOGETHER WITH FRIENDS OR RELATIVES?: ONCE A WEEK
HOW OFTEN DO YOU HAVE A DRINK CONTAINING ALCOHOL: 2-3 TIMES A WEEK
HOW OFTEN DO YOU HAVE SIX OR MORE DRINKS ON ONE OCCASION: NEVER
HOW OFTEN DO YOU GET TOGETHER WITH FRIENDS OR RELATIVES?: ONCE A WEEK
HOW MANY DRINKS CONTAINING ALCOHOL DO YOU HAVE ON A TYPICAL DAY WHEN YOU ARE DRINKING: 1 OR 2
HOW OFTEN DO YOU ATTEND CHURCH OR RELIGIOUS SERVICES?: MORE THAN 4 TIMES PER YEAR
HOW HARD IS IT FOR YOU TO PAY FOR THE VERY BASICS LIKE FOOD, HOUSING, MEDICAL CARE, AND HEATING?: NOT VERY HARD
HOW OFTEN DO YOU ATTENT MEETINGS OF THE CLUB OR ORGANIZATION YOU BELONG TO?: MORE THAN 4 TIMES PER YEAR
IN A TYPICAL WEEK, HOW MANY TIMES DO YOU TALK ON THE PHONE WITH FAMILY, FRIENDS, OR NEIGHBORS?: MORE THAN THREE TIMES A WEEK
HOW OFTEN DO YOU ATTENT MEETINGS OF THE CLUB OR ORGANIZATION YOU BELONG TO?: MORE THAN 4 TIMES PER YEAR
DO YOU BELONG TO ANY CLUBS OR ORGANIZATIONS SUCH AS CHURCH GROUPS UNIONS, FRATERNAL OR ATHLETIC GROUPS, OR SCHOOL GROUPS?: YES
DO YOU BELONG TO ANY CLUBS OR ORGANIZATIONS SUCH AS CHURCH GROUPS UNIONS, FRATERNAL OR ATHLETIC GROUPS, OR SCHOOL GROUPS?: YES

## 2021-11-04 ASSESSMENT — FIBROSIS 4 INDEX: FIB4 SCORE: 2.18

## 2021-11-04 NOTE — PROGRESS NOTES
Subjective:     CC:   Chief Complaint   Patient presents with   • Gynecologic Exam       HPI: Established patient, new to me  Jana Overton is a 66 y.o. female who presents for well woman/GYN exam.  Requesting Pap today.  Patient is here today requesting cervical cancer screening, discussed with the patient that she is not a candidate as per guidelines, since she is low risk.  Patient had 1 male partner for the past 45 years, no history of abnormal Pap, no early start of sexual activity.  Denies pelvic pain or spotting or bleeding.  Or other concerns.  Patient has GYN provider: No   Last Pap Smear: 2017   H/O Abnormal Pap: No  Last Mammogram: 2020, scheduled for her next one in couple of weeks  Last Bone Density Test: 2015, due for another DEXA bone scan  Last Colorectal Cancer Screening: History of Crohn's disease, patient gets regular colonoscopies.  Referred by her primary.  To do another colonoscopy.  Last Tdap: 2013  Received HPV series: Aged out    Exercise: moderate regular exercise, aerobic < 3 days a week  Diet: Well-balanced diet    No LMP recorded. Patient is postmenopausal.  She has not utilized hormone replacement therapy.  Denies any menopausal symptoms.  No significant bloating/fluid retention, pelvic pain, or dyspareunia. No abnormal vaginal discharge.   No breast tenderness, mass, nipple discharge or changes in size or contour.    OB History   No obstetric history on file.      She  has no history on file for sexual activity.    She  has a past medical history of Anesthesia, Anxiety, Arthritis, ASTHMA, Atrial fib/flut, Backpain, Breath shortness, Bronchitis, Chronic pain, Colostomy in place ([facility]) (10/14/2010), COPD (chronic obstructive pulmonary disease) ([facility]) (6/24/2014), COPD (chronic obstructive pulmonary disease) ([facility]) (6/24/2014), Crohn's disease of colon ([facility]), Dyspnea, History of cardiac murmur, Hypokalemia (6/24/2014), Indigestion, Infectious disease, Multiple falls (6/24/2014), Narcotic  dependence ([facility]) (9/23/2009), Obstruction, Other specified disorder of intestines, Pain (7/11/13), Pneumonia, Postherpetic neuralgia (6/24/2014), Rosacea (6/24/2014), and Sleep apnea. She also has no past medical history of CAD (coronary artery disease), Liver disease, or Seizure disorder ([facility]).  She  has a past surgical history that includes lumbar fusion anterior; cervical disk and fusion anterior; foot surgery; hand surgery; other abdominal surgery; gastroscopy with biopsy (11/22/08); ileostomy (11/11/2009); dilation and curettage (9/24/2010); gastroscopy (10/4/2011); colonoscopy (10/4/2011); hardware removal neuro (7/16/2012); mammoplasty reduction (7/17/2013); ileostomy (5/14/2014); pr breast reduction; abdominal exploration; lumpectomy; colon resection; ileostomy (12/29/2018); sigmoidoscopy flex (12/29/2018); exploratory laparotomy (12/29/2018); gastroscopy (1/6/2019); gastroscopy (N/A, 5/22/2019); ileostomy (1/20/2021); and lysis adhesions general (1/20/2021).    Family History   Problem Relation Age of Onset   • Lung Disease Mother         copd   • Diabetes Father    • Heart Disease Father    • Diabetes Sister    • Cancer Maternal Aunt 40        breast     Social History     Tobacco Use   • Smoking status: Never Smoker   • Smokeless tobacco: Never Used   • Tobacco comment: second hand smoke parents - smoked for only 1 year many years ago   Vaping Use   • Vaping Use: Never used   Substance Use Topics   • Alcohol use: Yes     Alcohol/week: 0.6 oz     Types: 1 Shots of liquor per week     Comment: gin and half a lime; tonic water   • Drug use: Yes     Types: Marijuana, Inhaled     Comment: medical marijuana through bong       Patient Active Problem List    Diagnosis Date Noted   • Migraine 06/04/2019   • Essential hypertension 05/20/2019   • DVT (deep venous thrombosis) ([facility]) 12/31/2018   • History of MRSA infection 12/29/2018   • History of infection with vancomycin resistant Enterococcus (VRE) 12/29/2018   •  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[facility]) 12/28/2018   • Dysphasia 12/28/2018   • Anemia 12/15/2018   • Thrush of mouth and esophagus ([facility]) 12/13/2018   • Liver enzyme elevation 12/12/2018   • Leukocytosis 12/12/2018   • Chronic respiratory failure with hypoxia ([facility]) 03/20/2018   • Anxiety disorder due to multiple medical problems 12/01/2017   • DDD (degenerative disc disease), lumbar 04/12/2017   • History of DVT (deep vein thrombosis) 11/23/2016   • Paroxysmal atrial fibrillation ([facility]) 03/26/2015   • Sleep apnea 10/26/2014   • Postherpetic neuralgia 06/24/2014   • Multiple falls 06/24/2014   • COPD (chronic obstructive pulmonary disease) ([facility]) 06/24/2014   • Rosacea 06/24/2014   • Ileostomy in place ([facility]) 06/26/2013   • GERD (gastroesophageal reflux disease) 12/05/2012   • Status post lumbar surgery 07/16/2012   • Crohn's disease of small intestine with complication ([facility]) 09/23/2009   • Chronic pain 09/23/2009   • Opioid type dependence, continuous (CMS-HCC) 09/23/2009     Current Outpatient Medications   Medication Sig Dispense Refill   • EPINEPHrine (EPIPEN) 0.3 MG/0.3ML Solution Auto-injector solution for injection INJECT INTO THE MUSCLE EVERY 10 MINUTES AS NEEDED FOR ANAPHYLAXSIS     • fluconazole (DIFLUCAN) 40 MG/ML suspension  (Patient not taking: Reported on 10/28/2021)     • Naloxone (NARCAN) 4 MG/0.1ML Liquid Administer 4 mg into affected nostril(S). (Patient not taking: Reported on 10/28/2021)     • ondansetron (ZOFRAN) 4 MG Tab tablet Take 4 mg by mouth. (Patient not taking: Reported on 10/28/2021)     • baclofen (LIORESAL) 10 MG Tab TAKE 1 TABLET BY MOUTH DAILY AS NEEDED FOR MUSCLE PAIN OR SPASMS     • metoprolol tartrate (LOPRESSOR) 50 MG Tab TAKE 1 TABLET BY MOUTH TWICE DAILY, HOLD IF BLOOD PRESSURE LESS THAN 95/50 180 Tablet 2   • prochlorperazine (COMPAZINE) 10 MG Tab Take 1 Tablet by mouth 1 time a day as needed. 10 Tablet 0   • ondansetron (ZOFRAN ODT) 4 MG TABLET DISPERSIBLE Take 1-2 Tablets by mouth every 8  "hours as needed for Nausea. 20 Tablet 3   • spironolactone (ALDACTONE) 25 MG Tab Take 2 Tablets by mouth every day. 180 tablet 3   • potassium chloride ER (KLOR-CON) 10 MEQ tablet Take 10 mEq by mouth 1 time a day as needed (with Torsemide).     • oxyCODONE immediate release (ROXICODONE) 10 MG immediate release tablet Take 10 mg by mouth every 6 hours. Indications: Chronic Pain     • torsemide (DEMADEX) 10 MG tablet Take 10-20 mg by mouth 1 time a day as needed (by weight).     • Probiotic Product (PROBIOTIC PO) Take 1 Cap by mouth every evening.       No current facility-administered medications for this visit.     Allergies   Allergen Reactions   • Amitriptyline Unspecified     Nightmares     • Azathioprine Sodium Hives and Shortness of Breath   • Infliximab Hives, Shortness of Breath and Rash     .   • Methotrexate Hives, Shortness of Breath and Rash     .   • Methotrexate Hives, Rash and Shortness of Breath     .   • Morphine Shortness of Breath, Swelling and Palpitations   • Promethazine Shortness of Breath and Rash     .   • Roxanol [Morphine Sulfate] Swelling     Throat swells   • Carafate [Sucralfate] Vomiting and Nausea     Generalizes/Mouth Sores   • Demerol Vomiting   • Duragesic [Fentanyl]      \"Patches didn't work\"  Skin broke down   • Fentanyl Unspecified     Patches caused skin breakdown, no pain relief   • Lorazepam Unspecified     States give me nightmares.    • Meperidine Vomiting   • Methadone Unspecified     Didn't work   • Methadone Unspecified     Didn't work   • Other Drug Unspecified     steroids   • Pregabalin Rash     Rash     • Pseudoephedrine Vomiting   • Sulfa Drugs Hives and Rash     .  .   • Betamethasone Unspecified     Pt unable to remember   • Celestone [Betamethasone Sodium Phosphate] Unspecified     Pt unable to remember   • Sulfasalazine Rash     On chest   • Tizanidine Unspecified     Pt unable to remember   • Tizanidine Hydrochloride Unspecified     Pt unable to remember "       Review of Systems   Constitutional: Negative for fever, chills and malaise/fatigue.   HENT: Negative for congestion.    Eyes: Negative for pain.   Respiratory: Negative for cough and shortness of breath.    Cardiovascular: Negative for chest pain and leg swelling.   Gastrointestinal: Negative for nausea, vomiting, abdominal pain and diarrhea.   Genitourinary: Negative for dysuria and hematuria.   Skin: Negative for rash.   Neurological: Negative for dizziness, focal weakness and headaches.   Endo/Heme/Allergies: Does not bruise/bleed easily.   Psychiatric/Behavioral: Negative for depression.  The patient is not nervous/anxious.      Objective:   /70 (BP Location: Right arm, Patient Position: Sitting, BP Cuff Size: Adult)   Pulse 88   Temp 36.6 °C (97.9 °F) (Temporal)   Resp 16   Ht 1.829 m (6')   Wt 69.9 kg (154 lb)   SpO2 95%   BMI 20.89 kg/m²     Wt Readings from Last 4 Encounters:   11/04/21 69.9 kg (154 lb)   10/28/21 67.4 kg (148 lb 11.2 oz)   10/25/21 69.4 kg (153 lb)   10/11/21 71.7 kg (158 lb)           Physical Exam:  Constitutional: Well-developed and well-nourished. Not diaphoretic. No distress.   Skin: Skin is warm and dry. No rash noted.  Head: Atraumatic without lesions.  Eyes: Conjunctivae and extraocular motions are normal. Pupils are equal, round, and reactive to light. No scleral icterus.   Ears:  External ears unremarkable. Tympanic membranes clear and intact.  Nose: Nares patent. Septum midline. Turbinates without erythema nor edema. No discharge.   Mouth/Throat: Tongue normal. Oropharynx is clear and moist. Posterior pharynx without erythema or exudates.  Neck: Supple, trachea midline. Normal range of motion. No thyromegaly present. No lymphadenopathy--cervical or supraclavicular.  Cardiovascular: Regular rate and rhythm, S1 and S2 without murmur, rubs, or gallops.    Respiratory: Effort normal. Clear to auscultation throughout. No adventitious sounds.   Breast: Breasts  examined seated and supine. No skin changes, peau d'orange or nipple retraction. No discharge. No axillary or supraclavicular adenopathy. No masses or nodularity palpable.  Abdomen: Soft, non tender, and without distention. Active bowel sounds in all four quadrants. No rebound, guarding, masses or HSM.  : Perineum and external genitalia normal without rash. Vagina with scant discharge. Cervix without visible lesions or discharge. Bimanual exam without adnexal masses or cervical motion tenderness.  No Pap  Extremities: No cyanosis, clubbing, erythema, nor edema. Distal pulses intact and symmetric.   Musculoskeletal: All major joints AROM full in all directions without pain.  Neurological: Alert and oriented x 3. Grossly non-focal. Strength and sensation grossly intact. DTRs 2+/3 and symmetric.   Psychiatric:  Behavior, mood, and affect are appropriate.    Assessment and Plan:     Well woman exam:    Advised to do her mammogram, discussed with the patient that as per recommendations and guidelines she does not need Pap anymore since she is low risk,.  No Pap performed today.  She needs to follow-up with gastroenterology for her colonoscopy and do her mammogram.  Follow-up with PCP.    Health maintenance:   Labs per orders  Immunizations per orders  Patient counseled about skin care, diet, supplements, and exercise.  Discussed  mammography screening     Follow-up: No follow-ups on file.

## 2021-11-05 ENCOUNTER — RX ONLY (OUTPATIENT)
Age: 66
Setting detail: RX ONLY
End: 2021-11-05

## 2021-11-05 ENCOUNTER — APPOINTMENT (RX ONLY)
Dept: URBAN - METROPOLITAN AREA CLINIC 20 | Facility: CLINIC | Age: 66
Setting detail: DERMATOLOGY
End: 2021-11-05

## 2021-11-05 DIAGNOSIS — L57.0 ACTINIC KERATOSIS: ICD-10-CM

## 2021-11-05 LAB
ALBUMIN SERPL ELPH-MCNC: 3.83 G/DL (ref 3.75–5.01)
ALPHA1 GLOB SERPL ELPH-MCNC: 0.34 G/DL (ref 0.19–0.46)
ALPHA2 GLOB SERPL ELPH-MCNC: 0.87 G/DL (ref 0.48–1.05)
B-GLOBULIN SERPL ELPH-MCNC: 1.01 G/DL (ref 0.48–1.1)
EER MONOCLONAL PROTEIN AND FLC, SERUM Q5224: ABNORMAL
GAMMA GLOB SERPL ELPH-MCNC: 1.05 G/DL (ref 0.62–1.51)
IGA SERPL-MCNC: 419 MG/DL (ref 68–408)
IGG SERPL-MCNC: 1169 MG/DL (ref 768–1632)
IGM SERPL-MCNC: 56 MG/DL (ref 35–263)
INTERPRETATION SERPL IFE-IMP: ABNORMAL
INTERPRETATION SERPL IFE-IMP: ABNORMAL
KAPPA LC FREE SER-MCNC: 22.71 MG/L (ref 3.3–19.4)
KAPPA LC FREE/LAMBDA FREE SER NEPH: 1.21 {RATIO} (ref 0.26–1.65)
LAMBDA LC FREE SERPL-MCNC: 18.75 MG/L (ref 5.71–26.3)
PROT SERPL-MCNC: 7.1 G/DL (ref 6.3–8.2)

## 2021-11-05 PROCEDURE — ? PDT: RED

## 2021-11-05 PROCEDURE — 96567 PDT DSTR PRMLG LES SKN: CPT

## 2021-11-05 PROCEDURE — ? PHOTODYNAMIC THERAPY COUNSELING

## 2021-11-05 RX ORDER — ACYCLOVIR 400 MG/1
TABLET ORAL TID
Qty: 15 | Refills: 0 | Status: ERX | COMMUNITY
Start: 2021-11-05

## 2021-11-05 ASSESSMENT — LOCATION DETAILED DESCRIPTION DERM: LOCATION DETAILED: SUPERIOR MID FOREHEAD

## 2021-11-05 ASSESSMENT — LOCATION ZONE DERM: LOCATION ZONE: FACE

## 2021-11-05 ASSESSMENT — LOCATION SIMPLE DESCRIPTION DERM: LOCATION SIMPLE: SUPERIOR FOREHEAD

## 2021-11-05 NOTE — HPI: PHOTODYNAMIC THERAPY (PDT)
Have You Had Previous Treatments With Pdt Before?: has had previous treatments
When Outside In The Sun, Do You...: always burns, never tans
Additional History: Acyclovir Rx to pharmacy. Patient does not take any medication for lupus.

## 2021-11-05 NOTE — PROCEDURE: PDT: RED
Consent: Written consent obtained.  The risks were reviewed with the patient including but not limited to: pigmentary changes, pain, blistering, scabbing, redness, and the remote possibility of scarring.
Expiration Date (Optional): 09.2022
Post-Care Instructions: I reviewed with the patient in detail post-care instructions. Patient is to avoid sunlight for the next 2 days, and wear sun protection. Patients may expect sunburn like redness, discomfort and scabbing.
Detail Level: Zone
Debridement Text (Will Only Render In Visit Note If You Select Debridement Option Under Who Performed The Pdt Field): Prior to application of the photodynamic medication the hyperkeratotic lesions were curetted to make them more amenable to therapy.
Lot # (Optional): [id number]
Who Performed The Pdt?: Performed by Nurse, MA or Aesthetician ([id number])
Photosensitizer: Ameluz
Incubation Time (Set To 00:00:00 If Not Wanted): 01:30:00
Number Of Kerasticks/Tubes Of Metvixia/Tubes Of Ameluz Used: 1
Application Method: without occlusion
Anesthesia Type: 1% lidocaine with epinephrine
Total Number Of Aks Treated (Optional To Report): 0
Illumination Time: 7 min 07 sec
Eye Protection Applied?: Yes

## 2021-11-08 ENCOUNTER — TELEPHONE (OUTPATIENT)
Dept: HEMATOLOGY ONCOLOGY | Facility: MEDICAL CENTER | Age: 66
End: 2021-11-08

## 2021-11-09 NOTE — TELEPHONE ENCOUNTER
Patient was recently referred and seen on 10/28/2021.  She was referred by her PCP for elevated globulin level.  SPEP and UPEP were completed prior to initiation of visit.  SPEP showed a normal SPEP pattern but the immunofixation was not completed.  She did have an elevated IgA which was slightly elevated at 505.  UPEP was normal showing no M protein detected in the immunofixation was negative for any monoclonal free light chains.  Previous labs including CBC and CMP showed no evidence of anemia, kidney dysfunction or abnormal calcium levels.    SPEP was normal however immunofixation was not completed and with an elevated IgA I recommended patient have repeat labs for an immunofixation for complete work-up.  Labs completed 11/1/2021 do continue to show an elevated IgA which is slightly decreased to 419.  There is a normal IgG and IgM.  SPEP continues to be normal however the immunofixation does show a polyclonal increase in the IgA, no monoclonal protein seen.  Polyclonal increase is not indicative of a hematologic disorder but rather more inflammatory.  Patient does have a significant history of Crohn's disease with multiple other complaints.    At this time there is no concern for hematologic disorder and no further work-up is needed with IOC.  Did discuss the results of the phone with patient she was very pleased with the results.  I do recommend that patient have continued monitoring and recommend repeat monoclonal protein study and serum free light chains in 1 year with the immunofixation requested at that time.  We will have patient follow-up with her PCP for continued monitoring.    No further follow-up with IOC needed.

## 2021-11-11 ENCOUNTER — HOSPITAL ENCOUNTER (OUTPATIENT)
Dept: RADIOLOGY | Facility: MEDICAL CENTER | Age: 66
End: 2021-11-11
Attending: INTERNAL MEDICINE
Payer: COMMERCIAL

## 2021-11-11 PROCEDURE — 4410556 CT-CARDIAC SCORING (SELF PAY ONLY)

## 2021-11-23 ENCOUNTER — APPOINTMENT (RX ONLY)
Dept: URBAN - METROPOLITAN AREA CLINIC 20 | Facility: CLINIC | Age: 66
Setting detail: DERMATOLOGY
End: 2021-11-23

## 2021-11-23 DIAGNOSIS — Z41.9 ENCOUNTER FOR PROCEDURE FOR PURPOSES OTHER THAN REMEDYING HEALTH STATE, UNSPECIFIED: ICD-10-CM

## 2021-11-23 PROCEDURE — ? SCITON BBL

## 2021-11-23 ASSESSMENT — LOCATION SIMPLE DESCRIPTION DERM
LOCATION SIMPLE: LEFT FOREHEAD
LOCATION SIMPLE: NOSE
LOCATION SIMPLE: LEFT CHEEK
LOCATION SIMPLE: CHIN
LOCATION SIMPLE: RIGHT LIP
LOCATION SIMPLE: RIGHT CHEEK

## 2021-11-23 ASSESSMENT — LOCATION DETAILED DESCRIPTION DERM
LOCATION DETAILED: NASAL DORSUM
LOCATION DETAILED: LEFT CENTRAL MALAR CHEEK
LOCATION DETAILED: LEFT CHIN
LOCATION DETAILED: RIGHT CENTRAL MALAR CHEEK
LOCATION DETAILED: RIGHT LOWER CUTANEOUS LIP
LOCATION DETAILED: LEFT MEDIAL FOREHEAD

## 2021-11-23 ASSESSMENT — LOCATION ZONE DERM
LOCATION ZONE: FACE
LOCATION ZONE: NOSE
LOCATION ZONE: LIP

## 2021-11-23 NOTE — PROCEDURE: SCITON BBL
Spot Size: Finesse Adapter Size: 15 x 15 mm square
Preprocedure Text: The treatment areas were thoroughly cleaned. Any exposed at risk hair that was not to be treated was covered in white paper tape. Clear ultrasound gel was applied to the treatment area. The area was treated with no immediate stacking of pulses.
Anesthesia Volume In Cc: 0
Pulse Duration: 20
Spot Size: Finesse Adapter Size: 15 x 45 mm (No Finesse Adapter)
Pulse Duration Units: milliseconds
Post Procedure Text: The patient tolerated the procedure well. Ice-chilled washclothes were applied to the treatment area for comfort. Post care was reviewed with the patient.
Consent: Written consent obtained, risks reviewed including but not limited to crusting, scabbing, blistering, scarring, darker or lighter pigmentary change, bruising, and/or incomplete response.
Cooling (In C): 15
Detail Level: Zone
Repetition Rate (Hz): 10
Post-Care Instructions: I reviewed with the patient in detail post-care instructions. Patient should stay away from the sun and wear sun protection until treated areas are fully healed.
Cooling ?: Yes
Fluence (J/Cm2): 8
Hide Repetition Rate?: No
Passes: 1
Fluence (J/Cm2): 25
Price (Use Numbers Only, No Special Characters Or $): 450
Fluence (J/Cm2): 14

## 2021-12-07 ENCOUNTER — OFFICE VISIT (OUTPATIENT)
Dept: MEDICAL GROUP | Facility: LAB | Age: 66
End: 2021-12-07
Payer: MEDICARE

## 2021-12-07 ENCOUNTER — APPOINTMENT (OUTPATIENT)
Dept: RADIOLOGY | Facility: MEDICAL CENTER | Age: 66
End: 2021-12-07
Attending: EMERGENCY MEDICINE
Payer: MEDICARE

## 2021-12-07 ENCOUNTER — HOSPITAL ENCOUNTER (OUTPATIENT)
Facility: MEDICAL CENTER | Age: 66
End: 2021-12-08
Attending: EMERGENCY MEDICINE | Admitting: HOSPITALIST
Payer: MEDICARE

## 2021-12-07 VITALS
HEIGHT: 72 IN | BODY MASS INDEX: 21.21 KG/M2 | DIASTOLIC BLOOD PRESSURE: 66 MMHG | TEMPERATURE: 97 F | HEART RATE: 69 BPM | SYSTOLIC BLOOD PRESSURE: 120 MMHG | WEIGHT: 156.6 LBS | OXYGEN SATURATION: 97 %

## 2021-12-07 DIAGNOSIS — R89.9 ABNORMAL LABORATORY TEST: ICD-10-CM

## 2021-12-07 DIAGNOSIS — R10.31 RIGHT LOWER QUADRANT PAIN: ICD-10-CM

## 2021-12-07 DIAGNOSIS — E86.0 DEHYDRATION: ICD-10-CM

## 2021-12-07 DIAGNOSIS — N39.0 ACUTE UTI: ICD-10-CM

## 2021-12-07 DIAGNOSIS — N20.1 URETEROLITHIASIS: ICD-10-CM

## 2021-12-07 PROBLEM — N30.00 ACUTE CYSTITIS WITHOUT HEMATURIA: Status: ACTIVE | Noted: 2021-12-07

## 2021-12-07 LAB
ALBUMIN SERPL BCP-MCNC: 4.4 G/DL (ref 3.2–4.9)
ALBUMIN/GLOB SERPL: 1.2 G/DL
ALP SERPL-CCNC: 128 U/L (ref 30–99)
ALT SERPL-CCNC: 19 U/L (ref 2–50)
ANION GAP SERPL CALC-SCNC: 20 MMOL/L (ref 7–16)
APPEARANCE UR: ABNORMAL
APPEARANCE UR: CLEAR
AST SERPL-CCNC: 22 U/L (ref 12–45)
BACTERIA #/AREA URNS HPF: ABNORMAL /HPF
BACTERIA #/AREA URNS HPF: ABNORMAL /HPF
BASOPHILS # BLD AUTO: 0.6 % (ref 0–1.8)
BASOPHILS # BLD: 0.07 K/UL (ref 0–0.12)
BILIRUB SERPL-MCNC: 0.4 MG/DL (ref 0.1–1.5)
BILIRUB UR QL STRIP.AUTO: NEGATIVE
BILIRUB UR QL STRIP.AUTO: NEGATIVE
BUN SERPL-MCNC: 26 MG/DL (ref 8–22)
CALCIUM SERPL-MCNC: 9.8 MG/DL (ref 8.4–10.2)
CHLORIDE SERPL-SCNC: 99 MMOL/L (ref 96–112)
CO2 SERPL-SCNC: 18 MMOL/L (ref 20–33)
COLOR UR: YELLOW
COLOR UR: YELLOW
CREAT SERPL-MCNC: 1.5 MG/DL (ref 0.5–1.4)
EOSINOPHIL # BLD AUTO: 0.12 K/UL (ref 0–0.51)
EOSINOPHIL NFR BLD: 1.1 % (ref 0–6.9)
EPI CELLS #/AREA URNS HPF: ABNORMAL /HPF
EPI CELLS #/AREA URNS HPF: ABNORMAL /HPF
ERYTHROCYTE [DISTWIDTH] IN BLOOD BY AUTOMATED COUNT: 41.3 FL (ref 35.9–50)
GLOBULIN SER CALC-MCNC: 3.8 G/DL (ref 1.9–3.5)
GLUCOSE SERPL-MCNC: 108 MG/DL (ref 65–99)
GLUCOSE UR STRIP.AUTO-MCNC: NEGATIVE MG/DL
GLUCOSE UR STRIP.AUTO-MCNC: NEGATIVE MG/DL
HCT VFR BLD AUTO: 43.1 % (ref 37–47)
HGB BLD-MCNC: 14.1 G/DL (ref 12–16)
HYALINE CASTS #/AREA URNS LPF: ABNORMAL /LPF
IMM GRANULOCYTES # BLD AUTO: 0.04 K/UL (ref 0–0.11)
IMM GRANULOCYTES NFR BLD AUTO: 0.4 % (ref 0–0.9)
KETONES UR STRIP.AUTO-MCNC: NEGATIVE MG/DL
KETONES UR STRIP.AUTO-MCNC: NEGATIVE MG/DL
LEUKOCYTE ESTERASE UR QL STRIP.AUTO: ABNORMAL
LEUKOCYTE ESTERASE UR QL STRIP.AUTO: NEGATIVE
LIPASE SERPL-CCNC: 14 U/L (ref 7–58)
LYMPHOCYTES # BLD AUTO: 2.37 K/UL (ref 1–4.8)
LYMPHOCYTES NFR BLD: 21.1 % (ref 22–41)
MCH RBC QN AUTO: 30.8 PG (ref 27–33)
MCHC RBC AUTO-ENTMCNC: 32.7 G/DL (ref 33.6–35)
MCV RBC AUTO: 94.1 FL (ref 81.4–97.8)
MICRO URNS: ABNORMAL
MICRO URNS: ABNORMAL
MONOCYTES # BLD AUTO: 0.66 K/UL (ref 0–0.85)
MONOCYTES NFR BLD AUTO: 5.9 % (ref 0–13.4)
MUCOUS THREADS #/AREA URNS HPF: ABNORMAL /HPF
NEUTROPHILS # BLD AUTO: 7.97 K/UL (ref 2–7.15)
NEUTROPHILS NFR BLD: 70.9 % (ref 44–72)
NITRITE UR QL STRIP.AUTO: NEGATIVE
NITRITE UR QL STRIP.AUTO: POSITIVE
NRBC # BLD AUTO: 0 K/UL
NRBC BLD-RTO: 0 /100 WBC
PH UR STRIP.AUTO: 5.5 [PH] (ref 5–8)
PH UR STRIP.AUTO: 6 [PH] (ref 5–8)
PLATELET # BLD AUTO: 157 K/UL (ref 164–446)
PMV BLD AUTO: 10.7 FL (ref 9–12.9)
POTASSIUM SERPL-SCNC: 3.7 MMOL/L (ref 3.6–5.5)
PROT SERPL-MCNC: 8.2 G/DL (ref 6–8.2)
PROT UR QL STRIP: NEGATIVE MG/DL
PROT UR QL STRIP: NEGATIVE MG/DL
RBC # BLD AUTO: 4.58 M/UL (ref 4.2–5.4)
RBC # URNS HPF: ABNORMAL /HPF
RBC # URNS HPF: ABNORMAL /HPF
RBC UR QL AUTO: ABNORMAL
RBC UR QL AUTO: ABNORMAL
SARS-COV+SARS-COV-2 AG RESP QL IA.RAPID: NOTDETECTED
SODIUM SERPL-SCNC: 137 MMOL/L (ref 135–145)
SP GR UR STRIP.AUTO: 1.02
SP GR UR STRIP.AUTO: 1.02
SPECIMEN SOURCE: NORMAL
WBC # BLD AUTO: 11.2 K/UL (ref 4.8–10.8)
WBC #/AREA URNS HPF: ABNORMAL /HPF
WBC #/AREA URNS HPF: ABNORMAL /HPF

## 2021-12-07 PROCEDURE — 99213 OFFICE O/P EST LOW 20 MIN: CPT | Performed by: INTERNAL MEDICINE

## 2021-12-07 PROCEDURE — 99220 PR INITIAL OBSERVATION CARE,LEVL III: CPT | Performed by: HOSPITALIST

## 2021-12-07 PROCEDURE — 700105 HCHG RX REV CODE 258: Performed by: EMERGENCY MEDICINE

## 2021-12-07 PROCEDURE — 700102 HCHG RX REV CODE 250 W/ 637 OVERRIDE(OP): Performed by: HOSPITALIST

## 2021-12-07 PROCEDURE — 87426 SARSCOV CORONAVIRUS AG IA: CPT

## 2021-12-07 PROCEDURE — G0378 HOSPITAL OBSERVATION PER HR: HCPCS

## 2021-12-07 PROCEDURE — 96374 THER/PROPH/DIAG INJ IV PUSH: CPT

## 2021-12-07 PROCEDURE — 80053 COMPREHEN METABOLIC PANEL: CPT

## 2021-12-07 PROCEDURE — 96375 TX/PRO/DX INJ NEW DRUG ADDON: CPT

## 2021-12-07 PROCEDURE — 81001 URINALYSIS AUTO W/SCOPE: CPT

## 2021-12-07 PROCEDURE — 96376 TX/PRO/DX INJ SAME DRUG ADON: CPT

## 2021-12-07 PROCEDURE — 83690 ASSAY OF LIPASE: CPT

## 2021-12-07 PROCEDURE — 700111 HCHG RX REV CODE 636 W/ 250 OVERRIDE (IP): Performed by: HOSPITALIST

## 2021-12-07 PROCEDURE — 700111 HCHG RX REV CODE 636 W/ 250 OVERRIDE (IP): Performed by: EMERGENCY MEDICINE

## 2021-12-07 PROCEDURE — A9270 NON-COVERED ITEM OR SERVICE: HCPCS | Performed by: HOSPITALIST

## 2021-12-07 PROCEDURE — 74176 CT ABD & PELVIS W/O CONTRAST: CPT | Mod: ME

## 2021-12-07 PROCEDURE — 85025 COMPLETE CBC W/AUTO DIFF WBC: CPT

## 2021-12-07 PROCEDURE — 99285 EMERGENCY DEPT VISIT HI MDM: CPT

## 2021-12-07 PROCEDURE — 700105 HCHG RX REV CODE 258

## 2021-12-07 RX ORDER — ONDANSETRON 2 MG/ML
4 INJECTION INTRAMUSCULAR; INTRAVENOUS ONCE
Status: COMPLETED | OUTPATIENT
Start: 2021-12-07 | End: 2021-12-07

## 2021-12-07 RX ORDER — HYDROMORPHONE HYDROCHLORIDE 1 MG/ML
0.25 INJECTION, SOLUTION INTRAMUSCULAR; INTRAVENOUS; SUBCUTANEOUS
Status: DISCONTINUED | OUTPATIENT
Start: 2021-12-07 | End: 2021-12-08

## 2021-12-07 RX ORDER — SODIUM CHLORIDE 9 MG/ML
INJECTION, SOLUTION INTRAVENOUS CONTINUOUS
Status: DISCONTINUED | OUTPATIENT
Start: 2021-12-07 | End: 2021-12-09 | Stop reason: HOSPADM

## 2021-12-07 RX ORDER — SODIUM CHLORIDE 9 MG/ML
1000 INJECTION, SOLUTION INTRAVENOUS ONCE
Status: COMPLETED | OUTPATIENT
Start: 2021-12-07 | End: 2021-12-07

## 2021-12-07 RX ORDER — KETOROLAC TROMETHAMINE 30 MG/ML
15 INJECTION, SOLUTION INTRAMUSCULAR; INTRAVENOUS ONCE
Status: COMPLETED | OUTPATIENT
Start: 2021-12-07 | End: 2021-12-07

## 2021-12-07 RX ORDER — AMOXICILLIN 250 MG
2 CAPSULE ORAL 2 TIMES DAILY
Status: DISCONTINUED | OUTPATIENT
Start: 2021-12-07 | End: 2021-12-09 | Stop reason: HOSPADM

## 2021-12-07 RX ORDER — CEFTRIAXONE 2 G/1
2 INJECTION, POWDER, FOR SOLUTION INTRAMUSCULAR; INTRAVENOUS ONCE
Status: COMPLETED | OUTPATIENT
Start: 2021-12-07 | End: 2021-12-07

## 2021-12-07 RX ORDER — TIZANIDINE 4 MG/1
2 TABLET ORAL 2 TIMES DAILY
Status: ON HOLD | COMMUNITY
End: 2021-12-23

## 2021-12-07 RX ORDER — ONDANSETRON 4 MG/1
4 TABLET, ORALLY DISINTEGRATING ORAL EVERY 4 HOURS PRN
Status: DISCONTINUED | OUTPATIENT
Start: 2021-12-07 | End: 2021-12-09 | Stop reason: HOSPADM

## 2021-12-07 RX ORDER — SODIUM CHLORIDE 9 MG/ML
INJECTION, SOLUTION INTRAVENOUS
Status: COMPLETED
Start: 2021-12-07 | End: 2021-12-07

## 2021-12-07 RX ORDER — PROCHLORPERAZINE MALEATE 10 MG
10 TABLET ORAL
Status: DISCONTINUED | OUTPATIENT
Start: 2021-12-07 | End: 2021-12-09 | Stop reason: HOSPADM

## 2021-12-07 RX ORDER — HYDROMORPHONE HYDROCHLORIDE 1 MG/ML
0.5 INJECTION, SOLUTION INTRAMUSCULAR; INTRAVENOUS; SUBCUTANEOUS ONCE
Status: COMPLETED | OUTPATIENT
Start: 2021-12-07 | End: 2021-12-07

## 2021-12-07 RX ORDER — ACETAMINOPHEN 325 MG/1
650 TABLET ORAL EVERY 6 HOURS PRN
Status: DISCONTINUED | OUTPATIENT
Start: 2021-12-07 | End: 2021-12-09 | Stop reason: HOSPADM

## 2021-12-07 RX ORDER — OXYCODONE HYDROCHLORIDE 5 MG/1
5 TABLET ORAL
Status: DISCONTINUED | OUTPATIENT
Start: 2021-12-07 | End: 2021-12-07

## 2021-12-07 RX ORDER — SPIRONOLACTONE 50 MG/1
50 TABLET, FILM COATED ORAL DAILY
Status: DISCONTINUED | OUTPATIENT
Start: 2021-12-08 | End: 2021-12-09 | Stop reason: HOSPADM

## 2021-12-07 RX ORDER — TIZANIDINE 4 MG/1
4 TABLET ORAL EVERY 12 HOURS PRN
Status: DISCONTINUED | OUTPATIENT
Start: 2021-12-07 | End: 2021-12-09 | Stop reason: HOSPADM

## 2021-12-07 RX ORDER — OXYCODONE HYDROCHLORIDE 5 MG/1
2.5 TABLET ORAL
Status: DISCONTINUED | OUTPATIENT
Start: 2021-12-07 | End: 2021-12-07

## 2021-12-07 RX ORDER — OXYCODONE HYDROCHLORIDE 10 MG/1
10 TABLET ORAL EVERY 6 HOURS PRN
Status: DISCONTINUED | OUTPATIENT
Start: 2021-12-07 | End: 2021-12-09 | Stop reason: HOSPADM

## 2021-12-07 RX ORDER — BACLOFEN 10 MG/1
10 TABLET ORAL 2 TIMES DAILY PRN
Status: DISCONTINUED | OUTPATIENT
Start: 2021-12-07 | End: 2021-12-07

## 2021-12-07 RX ORDER — BISACODYL 10 MG
10 SUPPOSITORY, RECTAL RECTAL
Status: DISCONTINUED | OUTPATIENT
Start: 2021-12-07 | End: 2021-12-09 | Stop reason: HOSPADM

## 2021-12-07 RX ORDER — ONDANSETRON 2 MG/ML
4 INJECTION INTRAMUSCULAR; INTRAVENOUS EVERY 4 HOURS PRN
Status: DISCONTINUED | OUTPATIENT
Start: 2021-12-07 | End: 2021-12-09 | Stop reason: HOSPADM

## 2021-12-07 RX ORDER — POLYETHYLENE GLYCOL 3350 17 G/17G
1 POWDER, FOR SOLUTION ORAL
Status: DISCONTINUED | OUTPATIENT
Start: 2021-12-07 | End: 2021-12-09 | Stop reason: HOSPADM

## 2021-12-07 RX ADMIN — SODIUM CHLORIDE 1000 ML: 9 INJECTION, SOLUTION INTRAVENOUS at 17:59

## 2021-12-07 RX ADMIN — HYDROMORPHONE HYDROCHLORIDE 0.25 MG: 1 INJECTION, SOLUTION INTRAMUSCULAR; INTRAVENOUS; SUBCUTANEOUS at 22:58

## 2021-12-07 RX ADMIN — OXYCODONE HYDROCHLORIDE 10 MG: 10 TABLET ORAL at 20:32

## 2021-12-07 RX ADMIN — HYDROMORPHONE HYDROCHLORIDE 0.5 MG: 1 INJECTION, SOLUTION INTRAMUSCULAR; INTRAVENOUS; SUBCUTANEOUS at 17:57

## 2021-12-07 RX ADMIN — HYDROMORPHONE HYDROCHLORIDE 0.5 MG: 1 INJECTION, SOLUTION INTRAMUSCULAR; INTRAVENOUS; SUBCUTANEOUS at 18:53

## 2021-12-07 RX ADMIN — SODIUM CHLORIDE 100 ML: 900 INJECTION INTRAVENOUS at 19:43

## 2021-12-07 RX ADMIN — SODIUM CHLORIDE 100 ML: 9 INJECTION, SOLUTION INTRAVENOUS at 19:43

## 2021-12-07 RX ADMIN — CEFTRIAXONE SODIUM 2 G: 2 INJECTION, POWDER, FOR SOLUTION INTRAMUSCULAR; INTRAVENOUS at 19:43

## 2021-12-07 RX ADMIN — ONDANSETRON 4 MG: 2 INJECTION INTRAMUSCULAR; INTRAVENOUS at 17:57

## 2021-12-07 RX ADMIN — KETOROLAC TROMETHAMINE 15 MG: 30 INJECTION, SOLUTION INTRAMUSCULAR at 18:18

## 2021-12-07 RX ADMIN — HYDROMORPHONE HYDROCHLORIDE 0.5 MG: 1 INJECTION, SOLUTION INTRAMUSCULAR; INTRAVENOUS; SUBCUTANEOUS at 19:38

## 2021-12-07 ASSESSMENT — ENCOUNTER SYMPTOMS
MYALGIAS: 0
ABDOMINAL PAIN: 0
VOMITING: 0
BRUISES/BLEEDS EASILY: 0
STRIDOR: 0
EYE DISCHARGE: 0
SHORTNESS OF BREATH: 0
COUGH: 0
EYE REDNESS: 0
FOCAL WEAKNESS: 0
FLANK PAIN: 1
CHILLS: 1
FEVER: 1
NERVOUS/ANXIOUS: 0

## 2021-12-07 ASSESSMENT — LIFESTYLE VARIABLES
EVER FELT BAD OR GUILTY ABOUT YOUR DRINKING: NO
EVER HAD A DRINK FIRST THING IN THE MORNING TO STEADY YOUR NERVES TO GET RID OF A HANGOVER: NO
HOW MANY TIMES IN THE PAST YEAR HAVE YOU HAD 5 OR MORE DRINKS IN A DAY: 0
HAVE PEOPLE ANNOYED YOU BY CRITICIZING YOUR DRINKING: NO
CONSUMPTION TOTAL: NEGATIVE
ON A TYPICAL DAY WHEN YOU DRINK ALCOHOL HOW MANY DRINKS DO YOU HAVE: 0
ON A TYPICAL DAY WHEN YOU DRINK ALCOHOL HOW MANY DRINKS DO YOU HAVE: 0
HAVE YOU EVER FELT YOU SHOULD CUT DOWN ON YOUR DRINKING: NO
CONSUMPTION TOTAL: INCOMPLETE
AVERAGE NUMBER OF DAYS PER WEEK YOU HAVE A DRINK CONTAINING ALCOHOL: 0
AVERAGE NUMBER OF DAYS PER WEEK YOU HAVE A DRINK CONTAINING ALCOHOL: 0
TOTAL SCORE: 0
EVER FELT BAD OR GUILTY ABOUT YOUR DRINKING: NO
HAVE YOU EVER FELT YOU SHOULD CUT DOWN ON YOUR DRINKING: NO
TOTAL SCORE: 0
ALCOHOL_USE: NO
ALCOHOL_USE: NO
TOTAL SCORE: 0
HAVE PEOPLE ANNOYED YOU BY CRITICIZING YOUR DRINKING: NO
HOW MANY TIMES IN THE PAST YEAR HAVE YOU HAD 5 OR MORE DRINKS IN A DAY: 0

## 2021-12-07 ASSESSMENT — FIBROSIS 4 INDEX
FIB4 SCORE: 2.18
FIB4 SCORE: 2.18
FIB4 SCORE: 2.19

## 2021-12-07 ASSESSMENT — PAIN DESCRIPTION - PAIN TYPE
TYPE: ACUTE PAIN

## 2021-12-08 ENCOUNTER — APPOINTMENT (OUTPATIENT)
Dept: RADIOLOGY | Facility: MEDICAL CENTER | Age: 66
End: 2021-12-08
Attending: UROLOGY
Payer: MEDICARE

## 2021-12-08 ENCOUNTER — ANESTHESIA EVENT (OUTPATIENT)
Dept: SURGERY | Facility: MEDICAL CENTER | Age: 66
End: 2021-12-08
Payer: MEDICARE

## 2021-12-08 ENCOUNTER — APPOINTMENT (OUTPATIENT)
Dept: RADIOLOGY | Facility: MEDICAL CENTER | Age: 66
End: 2021-12-08
Attending: HOSPITALIST
Payer: MEDICARE

## 2021-12-08 ENCOUNTER — ANESTHESIA (OUTPATIENT)
Dept: SURGERY | Facility: MEDICAL CENTER | Age: 66
End: 2021-12-08
Payer: MEDICARE

## 2021-12-08 VITALS
TEMPERATURE: 97.8 F | HEART RATE: 82 BPM | BODY MASS INDEX: 21.98 KG/M2 | SYSTOLIC BLOOD PRESSURE: 134 MMHG | RESPIRATION RATE: 20 BRPM | HEIGHT: 72 IN | OXYGEN SATURATION: 94 % | DIASTOLIC BLOOD PRESSURE: 59 MMHG | WEIGHT: 162.26 LBS

## 2021-12-08 LAB
ALBUMIN SERPL BCP-MCNC: 3.6 G/DL (ref 3.2–4.9)
ALBUMIN/GLOB SERPL: 1.2 G/DL
ALP SERPL-CCNC: 97 U/L (ref 30–99)
ALT SERPL-CCNC: 14 U/L (ref 2–50)
ANION GAP SERPL CALC-SCNC: 14 MMOL/L (ref 7–16)
AST SERPL-CCNC: 15 U/L (ref 12–45)
BASOPHILS # BLD AUTO: 0.5 % (ref 0–1.8)
BASOPHILS # BLD: 0.04 K/UL (ref 0–0.12)
BILIRUB SERPL-MCNC: 0.4 MG/DL (ref 0.1–1.5)
BUN SERPL-MCNC: 22 MG/DL (ref 8–22)
CALCIUM SERPL-MCNC: 8.8 MG/DL (ref 8.4–10.2)
CHLORIDE SERPL-SCNC: 106 MMOL/L (ref 96–112)
CO2 SERPL-SCNC: 20 MMOL/L (ref 20–33)
CREAT SERPL-MCNC: 1.44 MG/DL (ref 0.5–1.4)
EOSINOPHIL # BLD AUTO: 0.17 K/UL (ref 0–0.51)
EOSINOPHIL NFR BLD: 2.2 % (ref 0–6.9)
ERYTHROCYTE [DISTWIDTH] IN BLOOD BY AUTOMATED COUNT: 40.4 FL (ref 35.9–50)
GLOBULIN SER CALC-MCNC: 3.1 G/DL (ref 1.9–3.5)
GLUCOSE SERPL-MCNC: 98 MG/DL (ref 65–99)
HCT VFR BLD AUTO: 35.8 % (ref 37–47)
HGB BLD-MCNC: 11.7 G/DL (ref 12–16)
IMM GRANULOCYTES # BLD AUTO: 0.02 K/UL (ref 0–0.11)
IMM GRANULOCYTES NFR BLD AUTO: 0.3 % (ref 0–0.9)
LYMPHOCYTES # BLD AUTO: 1.6 K/UL (ref 1–4.8)
LYMPHOCYTES NFR BLD: 21 % (ref 22–41)
MAGNESIUM SERPL-MCNC: 1.6 MG/DL (ref 1.5–2.5)
MCH RBC QN AUTO: 30.4 PG (ref 27–33)
MCHC RBC AUTO-ENTMCNC: 32.7 G/DL (ref 33.6–35)
MCV RBC AUTO: 93 FL (ref 81.4–97.8)
MONOCYTES # BLD AUTO: 0.7 K/UL (ref 0–0.85)
MONOCYTES NFR BLD AUTO: 9.2 % (ref 0–13.4)
NEUTROPHILS # BLD AUTO: 5.1 K/UL (ref 2–7.15)
NEUTROPHILS NFR BLD: 66.8 % (ref 44–72)
NRBC # BLD AUTO: 0 K/UL
NRBC BLD-RTO: 0 /100 WBC
PLATELET # BLD AUTO: 169 K/UL (ref 164–446)
PMV BLD AUTO: 10.3 FL (ref 9–12.9)
POTASSIUM SERPL-SCNC: 3.6 MMOL/L (ref 3.6–5.5)
PROT SERPL-MCNC: 6.7 G/DL (ref 6–8.2)
RBC # BLD AUTO: 3.85 M/UL (ref 4.2–5.4)
SODIUM SERPL-SCNC: 140 MMOL/L (ref 135–145)
WBC # BLD AUTO: 7.6 K/UL (ref 4.8–10.8)

## 2021-12-08 PROCEDURE — 700102 HCHG RX REV CODE 250 W/ 637 OVERRIDE(OP): Performed by: HOSPITALIST

## 2021-12-08 PROCEDURE — 700101 HCHG RX REV CODE 250: Performed by: ANESTHESIOLOGY

## 2021-12-08 PROCEDURE — 700111 HCHG RX REV CODE 636 W/ 250 OVERRIDE (IP): Performed by: ANESTHESIOLOGY

## 2021-12-08 PROCEDURE — 85025 COMPLETE CBC W/AUTO DIFF WBC: CPT

## 2021-12-08 PROCEDURE — 160035 HCHG PACU - 1ST 60 MINS PHASE I: Performed by: UROLOGY

## 2021-12-08 PROCEDURE — C1758 CATHETER, URETERAL: HCPCS | Performed by: UROLOGY

## 2021-12-08 PROCEDURE — 160002 HCHG RECOVERY MINUTES (STAT): Performed by: UROLOGY

## 2021-12-08 PROCEDURE — 160048 HCHG OR STATISTICAL LEVEL 1-5: Performed by: UROLOGY

## 2021-12-08 PROCEDURE — 94760 N-INVAS EAR/PLS OXIMETRY 1: CPT

## 2021-12-08 PROCEDURE — 700105 HCHG RX REV CODE 258: Performed by: ANESTHESIOLOGY

## 2021-12-08 PROCEDURE — A9270 NON-COVERED ITEM OR SERVICE: HCPCS | Performed by: HOSPITALIST

## 2021-12-08 PROCEDURE — 501329 HCHG SET, CYSTO IRRIG Y TUR: Performed by: UROLOGY

## 2021-12-08 PROCEDURE — 96365 THER/PROPH/DIAG IV INF INIT: CPT

## 2021-12-08 PROCEDURE — 99217 PR OBSERVATION CARE DISCHARGE: CPT | Performed by: HOSPITALIST

## 2021-12-08 PROCEDURE — 96376 TX/PRO/DX INJ SAME DRUG ADON: CPT

## 2021-12-08 PROCEDURE — 500879 HCHG PACK, CYSTO: Performed by: UROLOGY

## 2021-12-08 PROCEDURE — 700111 HCHG RX REV CODE 636 W/ 250 OVERRIDE (IP): Performed by: HOSPITALIST

## 2021-12-08 PROCEDURE — G0378 HOSPITAL OBSERVATION PER HR: HCPCS

## 2021-12-08 PROCEDURE — 700102 HCHG RX REV CODE 250 W/ 637 OVERRIDE(OP): Performed by: ANESTHESIOLOGY

## 2021-12-08 PROCEDURE — C2617 STENT, NON-COR, TEM W/O DEL: HCPCS | Performed by: UROLOGY

## 2021-12-08 PROCEDURE — 80053 COMPREHEN METABOLIC PANEL: CPT

## 2021-12-08 PROCEDURE — A9270 NON-COVERED ITEM OR SERVICE: HCPCS | Performed by: ANESTHESIOLOGY

## 2021-12-08 PROCEDURE — C1769 GUIDE WIRE: HCPCS | Performed by: UROLOGY

## 2021-12-08 PROCEDURE — 160028 HCHG SURGERY MINUTES - 1ST 30 MINS LEVEL 3: Performed by: UROLOGY

## 2021-12-08 PROCEDURE — 700105 HCHG RX REV CODE 258: Performed by: HOSPITALIST

## 2021-12-08 PROCEDURE — 700111 HCHG RX REV CODE 636 W/ 250 OVERRIDE (IP): Performed by: INTERNAL MEDICINE

## 2021-12-08 PROCEDURE — 76937 US GUIDE VASCULAR ACCESS: CPT

## 2021-12-08 PROCEDURE — 96375 TX/PRO/DX INJ NEW DRUG ADDON: CPT

## 2021-12-08 PROCEDURE — 83735 ASSAY OF MAGNESIUM: CPT

## 2021-12-08 PROCEDURE — 160009 HCHG ANES TIME/MIN: Performed by: UROLOGY

## 2021-12-08 DEVICE — STENT UROLOGICAL POLARIS 6X28  ULTRA: Type: IMPLANTABLE DEVICE | Site: URETER | Status: FUNCTIONAL

## 2021-12-08 RX ORDER — MIDAZOLAM HYDROCHLORIDE 1 MG/ML
1 INJECTION INTRAMUSCULAR; INTRAVENOUS
Status: DISCONTINUED | OUTPATIENT
Start: 2021-12-08 | End: 2021-12-08 | Stop reason: HOSPADM

## 2021-12-08 RX ORDER — DEXAMETHASONE SODIUM PHOSPHATE 4 MG/ML
INJECTION, SOLUTION INTRA-ARTICULAR; INTRALESIONAL; INTRAMUSCULAR; INTRAVENOUS; SOFT TISSUE PRN
Status: DISCONTINUED | OUTPATIENT
Start: 2021-12-08 | End: 2021-12-08 | Stop reason: SURG

## 2021-12-08 RX ORDER — HYDROMORPHONE HYDROCHLORIDE 1 MG/ML
1 INJECTION, SOLUTION INTRAMUSCULAR; INTRAVENOUS; SUBCUTANEOUS ONCE
Status: COMPLETED | OUTPATIENT
Start: 2021-12-08 | End: 2021-12-08

## 2021-12-08 RX ORDER — DIPHENHYDRAMINE HYDROCHLORIDE 50 MG/ML
12.5 INJECTION INTRAMUSCULAR; INTRAVENOUS
Status: DISCONTINUED | OUTPATIENT
Start: 2021-12-08 | End: 2021-12-08 | Stop reason: HOSPADM

## 2021-12-08 RX ORDER — HYDROMORPHONE HYDROCHLORIDE 2 MG/ML
0.1 INJECTION, SOLUTION INTRAMUSCULAR; INTRAVENOUS; SUBCUTANEOUS
Status: DISCONTINUED | OUTPATIENT
Start: 2021-12-08 | End: 2021-12-08 | Stop reason: HOSPADM

## 2021-12-08 RX ORDER — OXYCODONE HCL 5 MG/5 ML
10 SOLUTION, ORAL ORAL
Status: COMPLETED | OUTPATIENT
Start: 2021-12-08 | End: 2021-12-08

## 2021-12-08 RX ORDER — ONDANSETRON 2 MG/ML
4 INJECTION INTRAMUSCULAR; INTRAVENOUS
Status: DISCONTINUED | OUTPATIENT
Start: 2021-12-08 | End: 2021-12-08 | Stop reason: HOSPADM

## 2021-12-08 RX ORDER — HALOPERIDOL 5 MG/ML
1 INJECTION INTRAMUSCULAR
Status: DISCONTINUED | OUTPATIENT
Start: 2021-12-08 | End: 2021-12-08 | Stop reason: HOSPADM

## 2021-12-08 RX ORDER — HYDROMORPHONE HYDROCHLORIDE 2 MG/ML
0.2 INJECTION, SOLUTION INTRAMUSCULAR; INTRAVENOUS; SUBCUTANEOUS
Status: DISCONTINUED | OUTPATIENT
Start: 2021-12-08 | End: 2021-12-08 | Stop reason: HOSPADM

## 2021-12-08 RX ORDER — HYDROMORPHONE HYDROCHLORIDE 1 MG/ML
0.5 INJECTION, SOLUTION INTRAMUSCULAR; INTRAVENOUS; SUBCUTANEOUS
Status: DISCONTINUED | OUTPATIENT
Start: 2021-12-08 | End: 2021-12-08

## 2021-12-08 RX ORDER — PHENYLEPHRINE HCL IN 0.9% NACL 0.5 MG/5ML
SYRINGE (ML) INTRAVENOUS PRN
Status: DISCONTINUED | OUTPATIENT
Start: 2021-12-08 | End: 2021-12-08 | Stop reason: SURG

## 2021-12-08 RX ORDER — SODIUM CHLORIDE, SODIUM LACTATE, POTASSIUM CHLORIDE, CALCIUM CHLORIDE 600; 310; 30; 20 MG/100ML; MG/100ML; MG/100ML; MG/100ML
INJECTION, SOLUTION INTRAVENOUS CONTINUOUS
Status: DISCONTINUED | OUTPATIENT
Start: 2021-12-08 | End: 2021-12-08 | Stop reason: HOSPADM

## 2021-12-08 RX ORDER — HYDROMORPHONE HYDROCHLORIDE 2 MG/ML
INJECTION, SOLUTION INTRAMUSCULAR; INTRAVENOUS; SUBCUTANEOUS PRN
Status: DISCONTINUED | OUTPATIENT
Start: 2021-12-08 | End: 2021-12-08 | Stop reason: SURG

## 2021-12-08 RX ORDER — ONDANSETRON 2 MG/ML
INJECTION INTRAMUSCULAR; INTRAVENOUS PRN
Status: DISCONTINUED | OUTPATIENT
Start: 2021-12-08 | End: 2021-12-08 | Stop reason: SURG

## 2021-12-08 RX ORDER — HYDRALAZINE HYDROCHLORIDE 20 MG/ML
5 INJECTION INTRAMUSCULAR; INTRAVENOUS
Status: DISCONTINUED | OUTPATIENT
Start: 2021-12-08 | End: 2021-12-08 | Stop reason: HOSPADM

## 2021-12-08 RX ORDER — LIDOCAINE HYDROCHLORIDE 20 MG/ML
INJECTION, SOLUTION EPIDURAL; INFILTRATION; INTRACAUDAL; PERINEURAL PRN
Status: DISCONTINUED | OUTPATIENT
Start: 2021-12-08 | End: 2021-12-08 | Stop reason: SURG

## 2021-12-08 RX ORDER — HYDROMORPHONE HYDROCHLORIDE 2 MG/ML
0.4 INJECTION, SOLUTION INTRAMUSCULAR; INTRAVENOUS; SUBCUTANEOUS
Status: DISCONTINUED | OUTPATIENT
Start: 2021-12-08 | End: 2021-12-08 | Stop reason: HOSPADM

## 2021-12-08 RX ORDER — KETOROLAC TROMETHAMINE 30 MG/ML
INJECTION, SOLUTION INTRAMUSCULAR; INTRAVENOUS PRN
Status: DISCONTINUED | OUTPATIENT
Start: 2021-12-08 | End: 2021-12-08 | Stop reason: SURG

## 2021-12-08 RX ORDER — METOPROLOL TARTRATE 1 MG/ML
1 INJECTION, SOLUTION INTRAVENOUS
Status: DISCONTINUED | OUTPATIENT
Start: 2021-12-08 | End: 2021-12-08 | Stop reason: HOSPADM

## 2021-12-08 RX ORDER — SODIUM CHLORIDE, SODIUM LACTATE, POTASSIUM CHLORIDE, CALCIUM CHLORIDE 600; 310; 30; 20 MG/100ML; MG/100ML; MG/100ML; MG/100ML
INJECTION, SOLUTION INTRAVENOUS CONTINUOUS
Status: ACTIVE | OUTPATIENT
Start: 2021-12-08 | End: 2021-12-08

## 2021-12-08 RX ORDER — OXYCODONE HCL 5 MG/5 ML
5 SOLUTION, ORAL ORAL
Status: COMPLETED | OUTPATIENT
Start: 2021-12-08 | End: 2021-12-08

## 2021-12-08 RX ORDER — SODIUM CHLORIDE, SODIUM LACTATE, POTASSIUM CHLORIDE, CALCIUM CHLORIDE 600; 310; 30; 20 MG/100ML; MG/100ML; MG/100ML; MG/100ML
INJECTION, SOLUTION INTRAVENOUS
Status: DISCONTINUED | OUTPATIENT
Start: 2021-12-08 | End: 2021-12-08 | Stop reason: SURG

## 2021-12-08 RX ORDER — HYDROMORPHONE HYDROCHLORIDE 1 MG/ML
0.5 INJECTION, SOLUTION INTRAMUSCULAR; INTRAVENOUS; SUBCUTANEOUS
Status: DISCONTINUED | OUTPATIENT
Start: 2021-12-08 | End: 2021-12-09 | Stop reason: HOSPADM

## 2021-12-08 RX ORDER — MIDAZOLAM HYDROCHLORIDE 1 MG/ML
INJECTION INTRAMUSCULAR; INTRAVENOUS PRN
Status: DISCONTINUED | OUTPATIENT
Start: 2021-12-08 | End: 2021-12-08 | Stop reason: SURG

## 2021-12-08 RX ORDER — LABETALOL HYDROCHLORIDE 5 MG/ML
5 INJECTION, SOLUTION INTRAVENOUS
Status: DISCONTINUED | OUTPATIENT
Start: 2021-12-08 | End: 2021-12-08 | Stop reason: HOSPADM

## 2021-12-08 RX ORDER — ROCURONIUM BROMIDE 10 MG/ML
INJECTION, SOLUTION INTRAVENOUS PRN
Status: DISCONTINUED | OUTPATIENT
Start: 2021-12-08 | End: 2021-12-08 | Stop reason: SURG

## 2021-12-08 RX ADMIN — HYDROMORPHONE HYDROCHLORIDE 2 MG: 2 INJECTION, SOLUTION INTRAMUSCULAR; INTRAVENOUS; SUBCUTANEOUS at 14:35

## 2021-12-08 RX ADMIN — HYDROMORPHONE HYDROCHLORIDE 1 MG: 1 INJECTION, SOLUTION INTRAMUSCULAR; INTRAVENOUS; SUBCUTANEOUS at 10:53

## 2021-12-08 RX ADMIN — Medication 100 MCG: at 14:43

## 2021-12-08 RX ADMIN — MIDAZOLAM HYDROCHLORIDE 1 MG: 1 INJECTION, SOLUTION INTRAMUSCULAR; INTRAVENOUS at 15:15

## 2021-12-08 RX ADMIN — HYDROMORPHONE HYDROCHLORIDE 1 MG: 1 INJECTION, SOLUTION INTRAMUSCULAR; INTRAVENOUS; SUBCUTANEOUS at 06:10

## 2021-12-08 RX ADMIN — ROCURONIUM BROMIDE 50 MG: 10 INJECTION, SOLUTION INTRAVENOUS at 14:35

## 2021-12-08 RX ADMIN — DEXAMETHASONE SODIUM PHOSPHATE 8 MG: 4 INJECTION, SOLUTION INTRAMUSCULAR; INTRAVENOUS at 14:35

## 2021-12-08 RX ADMIN — PROPOFOL 200 MG: 10 INJECTION, EMULSION INTRAVENOUS at 14:35

## 2021-12-08 RX ADMIN — SODIUM CHLORIDE, POTASSIUM CHLORIDE, SODIUM LACTATE AND CALCIUM CHLORIDE: 600; 310; 30; 20 INJECTION, SOLUTION INTRAVENOUS at 14:30

## 2021-12-08 RX ADMIN — LIDOCAINE HYDROCHLORIDE 70 MG: 20 INJECTION, SOLUTION EPIDURAL; INFILTRATION; INTRACAUDAL; PERINEURAL at 14:35

## 2021-12-08 RX ADMIN — SUGAMMADEX 200 MG: 100 INJECTION, SOLUTION INTRAVENOUS at 14:47

## 2021-12-08 RX ADMIN — HYDROMORPHONE HYDROCHLORIDE 1 MG: 1 INJECTION, SOLUTION INTRAMUSCULAR; INTRAVENOUS; SUBCUTANEOUS at 22:01

## 2021-12-08 RX ADMIN — PROCHLORPERAZINE MALEATE 10 MG: 10 TABLET ORAL at 05:41

## 2021-12-08 RX ADMIN — HYDROMORPHONE HYDROCHLORIDE 0.25 MG: 1 INJECTION, SOLUTION INTRAMUSCULAR; INTRAVENOUS; SUBCUTANEOUS at 03:36

## 2021-12-08 RX ADMIN — ONDANSETRON 4 MG: 2 INJECTION INTRAMUSCULAR; INTRAVENOUS at 14:30

## 2021-12-08 RX ADMIN — SODIUM CHLORIDE: 9 INJECTION, SOLUTION INTRAVENOUS at 20:38

## 2021-12-08 RX ADMIN — ONDANSETRON 4 MG: 2 INJECTION INTRAMUSCULAR; INTRAVENOUS at 09:39

## 2021-12-08 RX ADMIN — FENTANYL CITRATE 25 MCG: 50 INJECTION, SOLUTION INTRAMUSCULAR; INTRAVENOUS at 15:33

## 2021-12-08 RX ADMIN — KETOROLAC TROMETHAMINE 30 MG: 30 INJECTION, SOLUTION INTRAMUSCULAR at 14:47

## 2021-12-08 RX ADMIN — HYDROMORPHONE HYDROCHLORIDE 0.5 MG: 1 INJECTION, SOLUTION INTRAMUSCULAR; INTRAVENOUS; SUBCUTANEOUS at 09:49

## 2021-12-08 RX ADMIN — OXYCODONE HYDROCHLORIDE 5 MG: 5 SOLUTION ORAL at 15:15

## 2021-12-08 RX ADMIN — OXYCODONE HYDROCHLORIDE 10 MG: 10 TABLET ORAL at 02:35

## 2021-12-08 RX ADMIN — CEFTRIAXONE SODIUM 1 G: 1 INJECTION, POWDER, FOR SOLUTION INTRAMUSCULAR; INTRAVENOUS at 09:42

## 2021-12-08 RX ADMIN — ONDANSETRON 4 MG: 4 TABLET, ORALLY DISINTEGRATING ORAL at 02:35

## 2021-12-08 RX ADMIN — HYDROMORPHONE HYDROCHLORIDE 0.5 MG: 1 INJECTION, SOLUTION INTRAMUSCULAR; INTRAVENOUS; SUBCUTANEOUS at 20:37

## 2021-12-08 RX ADMIN — HYDROMORPHONE HYDROCHLORIDE 0.5 MG: 1 INJECTION, SOLUTION INTRAMUSCULAR; INTRAVENOUS; SUBCUTANEOUS at 17:10

## 2021-12-08 RX ADMIN — MIDAZOLAM HYDROCHLORIDE 2 MG: 1 INJECTION, SOLUTION INTRAMUSCULAR; INTRAVENOUS at 14:30

## 2021-12-08 RX ADMIN — ONDANSETRON 4 MG: 2 INJECTION INTRAMUSCULAR; INTRAVENOUS at 14:47

## 2021-12-08 ASSESSMENT — COGNITIVE AND FUNCTIONAL STATUS - GENERAL
SUGGESTED CMS G CODE MODIFIER DAILY ACTIVITY: CH
DAILY ACTIVITIY SCORE: 24
MOBILITY SCORE: 24
SUGGESTED CMS G CODE MODIFIER MOBILITY: CH

## 2021-12-08 ASSESSMENT — ENCOUNTER SYMPTOMS
NAUSEA: 1
FEVER: 0
COUGH: 0
CHILLS: 0
DIZZINESS: 0
ABDOMINAL PAIN: 1
VOMITING: 0
FLANK PAIN: 1
PALPITATIONS: 0
HEADACHES: 0
SHORTNESS OF BREATH: 0

## 2021-12-08 ASSESSMENT — PAIN DESCRIPTION - PAIN TYPE
TYPE: ACUTE PAIN

## 2021-12-08 ASSESSMENT — FIBROSIS 4 INDEX: FIB4 SCORE: 1.61

## 2021-12-08 ASSESSMENT — PAIN SCALES - GENERAL: PAIN_LEVEL: 4

## 2021-12-08 NOTE — PROGRESS NOTES
Hospital Medicine Daily Progress Note    Date of Service  12/8/2021    Chief Complaint  Jana Overton is a 66 y.o. female admitted 12/7/2021 with abdominal pain    Hospital Course  66-year-old female with past medical history of hypertension and Crohn's disease status post colon and rectal resection presenting with abdominal pain.  She was found to have FLORES secondary to right nephrolithiasis with moderate hydronephrosis.  She was started on IV fluids and pain management.  Urology was consulted and patient to go for cystoscopy on 12/8.      Interval Problem Update  Patient in excruciating 10 out of 10 abdominal pain during my time of exam  Unfortunately patient's IV blew out several times since 3 AM so was having difficulty receiving her IV pain medication  Patient medicated with IV Dilaudid with subsequent improvement in pain  She is also feeling very nauseated due to the pain however has not vomited    I have personally seen and examined the patient at bedside. I discussed the plan of care with patient, bedside RN, charge RN,  and pharmacy.    Consultants/Specialty  urology    Code Status  Full Code    Disposition  Patient is not medically cleared.   Anticipate discharge to to home with close outpatient follow-up.  I have placed the appropriate orders for post-discharge needs.    Review of Systems  Review of Systems   Constitutional: Negative for chills and fever.   Respiratory: Negative for cough and shortness of breath.    Cardiovascular: Negative for chest pain and palpitations.   Gastrointestinal: Positive for abdominal pain and nausea. Negative for vomiting.   Genitourinary: Positive for flank pain. Negative for dysuria and urgency.   Neurological: Negative for dizziness and headaches.   All other systems reviewed and are negative.       Physical Exam  Temp:  [35.8 °C (96.5 °F)-36.8 °C (98.2 °F)] 36.7 °C (98.1 °F)  Pulse:  [69-87] 71  Resp:  [16-22] 16  BP: (120-180)/(51-95) 124/51  SpO2:  [96  %-100 %] 99 %    Physical Exam  Vitals and nursing note reviewed.   Constitutional:       General: She is in acute distress.      Comments: Tearful   HENT:      Head: Normocephalic and atraumatic.   Neurological:      General: No focal deficit present.      Mental Status: She is alert and oriented to person, place, and time.   Psychiatric:      Comments: Patient agitated due to pain         Fluids    Intake/Output Summary (Last 24 hours) at 12/8/2021 1054  Last data filed at 12/7/2021 2115  Gross per 24 hour   Intake 1000 ml   Output 250 ml   Net 750 ml       Laboratory  Recent Labs     12/07/21  1715 12/08/21  0321   WBC 11.2* 7.6   RBC 4.58 3.85*   HEMOGLOBIN 14.1 11.7*   HEMATOCRIT 43.1 35.8*   MCV 94.1 93.0   MCH 30.8 30.4   MCHC 32.7* 32.7*   RDW 41.3 40.4   PLATELETCT 157* 169   MPV 10.7 10.3     Recent Labs     12/07/21  1715 12/08/21  0321   SODIUM 137 140   POTASSIUM 3.7 3.6   CHLORIDE 99 106   CO2 18* 20   GLUCOSE 108* 98   BUN 26* 22   CREATININE 1.50* 1.44*   CALCIUM 9.8 8.8                   Imaging  IR-US GUIDED PIV   Final Result    Ultrasound-guided PERIPHERAL IV INSERTION performed by    qualified nursing staff as above.      CT-RENAL COLIC EVALUATION(A/P W/O)   Final Result      1.  4 mm right lower ureteral stone present just above the ureterovesical junction. There is moderate hydronephrosis seen above that level.      2.  Surgical change consistent with prior colectomy. There is a left lower quadrant ostomy site. No evidence of bowel obstruction or focal inflammatory change.      DX-PORTABLE FLUORO > 1 HOUR    (Results Pending)        Assessment/Plan  * FLORES (acute kidney injury) (HCC)- (present on admission)  Assessment & Plan  Multifactorial prerenal and postrenal secondary to obstructing 4 mm right ureteral stone.  \Intravenous fluids  Try to avoid/minimize nephrotoxins, monitor inputs and outputs  Improving, pending cystoscopy with urology    Ureteric stone- (present on admission)  Assessment  & Plan  Imaging shows evidence for a 4 mm right lower ureteric stone just above the ureter of his ankle junction with moderate hydronephrosis  Patient for cystoscopy today  Continue pain management    Acute cystitis without hematuria- (present on admission)  Assessment & Plan  Started on ceftriaxone in the emergency room.  Continue for now follow cultures and sensitivities    Essential hypertension- (present on admission)  Assessment & Plan  Resume home metoprolol and spironolactone with hold parameters.  We will hold home furosemide given her acute kidney injury and dehydration.       VTE prophylaxis: SCDs/TEDs    I have performed a physical exam and reviewed and updated ROS and Plan today (12/8/2021). In review of yesterday's note (12/7/2021), there are no changes except as documented above.

## 2021-12-08 NOTE — OR NURSING
1315: Report rcvd from CAMERON Marmolejo. Will get pt in time for procedure.  1350: Brought pt to pre-op holding via bed. Pain is increasing, as is nausea. MDs notified. Awake and alert, on room air.  1415: Patient allergies and NPO status verified, home medication reconciliation completed and belongings secured. Patient verbalizes understanding of pain scale, expected course of stay and plan of care. Surgical site verified with patient. IV patency verified. Sequentials placed on legs.

## 2021-12-08 NOTE — ANESTHESIA PREPROCEDURE EVALUATION
Case: [id number] Date/Time: 12/08/21 1415    Procedures:       CYSTOSCOPY, WITH URETERAL STENT INSERTION OR REMOVAL (Right )      LITHOTRIPSY, USING LASER      URETEROSCOPY    Location:  OR  / SURGERY [facility]    Surgeons: Luc Hook M.D.          Relevant Problems   ANESTHESIA   (positive) Sleep apnea      PULMONARY   (positive) COPD (chronic obstructive pulmonary disease) (HCC)   (positive) History of MRSA infection   (positive) History of infection with vancomycin resistant Enterococcus (VRE)      NEURO   (positive) History of DVT (deep vein thrombosis)   (positive) History of MRSA infection   (positive) History of infection with vancomycin resistant Enterococcus (VRE)   (positive) Migraine      CARDIAC   (positive) DVT (deep venous thrombosis) (HCC)   (positive) Essential hypertension   (positive) Migraine   (positive) Paroxysmal atrial fibrillation (HCC)      GI   (positive) GERD (gastroesophageal reflux disease)         (positive) FLORES (acute kidney injury) ([facility])       Physical Exam    Airway   Mallampati: II  TM distance: >3 FB  Neck ROM: full       Cardiovascular - normal exam  Rhythm: regular  Rate: normal  (-) murmur     Dental - normal exam           Pulmonary - normal exam  Breath sounds clear to auscultation     Abdominal    Neurological - normal exam                 Anesthesia Plan    ASA 3   ASA physical status 3 criteria: COPD    Plan - general       Airway plan will be LMA          Induction: intravenous    Postoperative Plan: Postoperative administration of opioids is intended.    Pertinent diagnostic labs and testing reviewed    Informed Consent:    Anesthetic plan and risks discussed with patient.    Use of blood products discussed with: patient whom consented to blood products.

## 2021-12-08 NOTE — ANESTHESIA TIME REPORT
Anesthesia Start and Stop Event Times     Date Time Event    12/8/2021 1429 Ready for Procedure     1430 Anesthesia Start     1504 Anesthesia Stop        Responsible Staff  12/08/21    Name Role Begin End    Ovi Frazier D.O. Anesth 1430 1504        Preop Diagnosis (Free Text):  Pre-op Diagnosis     Right obstructing pyonephrosis        Preop Diagnosis (Codes):    Premium Reason  K. Alert    Comments:

## 2021-12-08 NOTE — ED NOTES
"Patient upset she has to be in a hospital gown while in the hallway, apologize to patient we do not have any other room at this time.      Patient agreed to get into a hospital gown in the bathroom, informed patient a urine sample is ordered.  And states \"I will try to get one\"   "

## 2021-12-08 NOTE — ANESTHESIA PROCEDURE NOTES
Airway    Date/Time: 12/8/2021 2:35 PM  Performed by: Ovi Frazier D.O.  Authorized by: Ovi Frazier D.O.     Location:  OR  Urgency:  Elective  Indications for Airway Management:  Anesthesia      Spontaneous Ventilation: absent    Sedation Level:  Deep  Preoxygenated: Yes    Mask Difficulty Assessment:  0 - not attempted  Final Airway Type:  Supraglottic airway  Final Supraglottic Airway:  Standard LMA    SGA Size:  4  Number of Attempts at Approach:  1

## 2021-12-08 NOTE — PROGRESS NOTES
Received patient from Night shift RN . Patient is awake and alert.No sign of distress. Patient has nausea,received Zofran IV .. Patient also  complain of Pain to abdomen, received Pain medication as ordered. Fall precaution in placed, kept bed in lowest position and call light within reach.

## 2021-12-08 NOTE — DISCHARGE PLANNING
Anticipated Discharge Disposition: Home    Action: LSW met with pt at bedside to complete assessment. Pt verified address and PCP on face sheet. Pt lives with spouse and spouse will pick her up. Pt was previously independent with ADLs.    Barriers to Discharge: None    Plan: LSW to follow and assist as needed.    Care Transition Team Assessment    Information Source  Information Given By: Patient  Informant's Name: Jana  Who is responsible for making decisions for patient? : Patient    Readmission Evaluation  Is this a readmission?: No    Elopement Risk  Legal Hold: No  Ambulatory or Self Mobile in Wheelchair: Yes  Disoriented: No  Psychiatric Symptoms: None  History of Wandering: No  Elopement this Admit: No  Vocalizing Wanting to Leave: No  Displays Behaviors, Body Language Wanting to Leave: No-Not at Risk for Elopement  Elopement Risk: Not at Risk for Elopement    Interdisciplinary Discharge Planning  Primary Care Physician: Chase Charles DO  Patient or legal guardian wants to designate a caregiver: No  Support Systems: Spouse / Significant Other    Discharge Preparedness  What is your plan after discharge?: Home with help  What are your discharge supports?: Spouse  Prior Functional Level: Ambulatory,Independent with Activities of Daily Living  Difficulity with ADLs: None  Difficulity with IADLs: Driving  Difficulity with IADL Comments:  drives    Functional Assesment  Prior Functional Level: Ambulatory,Independent with Activities of Daily Living    Finances  Financial Barriers to Discharge: No    Vision / Hearing Impairment  Right Eye Vision: Impaired,Wears Glasses  Left Eye Vision: Impaired,Wears Glasses         Advance Directive  Advance Directive?: None    Domestic Abuse  Have you ever been the victim of abuse or violence?: No  Physical Abuse or Sexual Abuse: No  Verbal Abuse or Emotional Abuse: No  Possible Abuse/Neglect Reported to:: Not Applicable         Discharge Risks or Barriers  Discharge  risks or barriers?: No    Anticipated Discharge Information  Discharge Disposition: Discharged to home/self care (01)  Discharge Address: [address] [facility] [place] [address]

## 2021-12-08 NOTE — CARE PLAN
The patient is Stable - Low risk of patient condition declining or worsening    Shift Goals  Clinical Goals: Pt pain will be at a manageable level.    Progress made toward(s) clinical / shift goals:  Pt pain high throughout night. Unable to medicate pt sufficiently throughout night as IV infiltrated multiple times throughout the night. Pt was given IM dilaudid this am. Pt offered heat packs overnight as well.    Patient is not progressing towards the following goals:      Problem: Pain - Standard  Goal: Alleviation of pain or a reduction in pain to the patient’s comfort goal  Outcome: Not Progressing

## 2021-12-08 NOTE — ED TRIAGE NOTES
Jana Overton  66 y.o.  Chief Complaint   Patient presents with   • Sent by MD   • RLQ Pain   • Back Pain     Pt ambulatory to triage with c/o RLQ abdominal pain and R sided back pain since Sunday.  Pt went to her doctor and was told to come to the ER.  Pt appears uncomfortable in triage and rate her pain 8/10.

## 2021-12-08 NOTE — PROGRESS NOTES
"Pt IV infiltrated once again. Pt tearful and upset that her pain is not controlled. Pt demanded pain medication now or threatened to leave. Informed pt that she is strict NPO, pt stated \" I do not care give me it now or I will leave.\" Pt medicated with oral pain medication. Informed pt that another nurse would be in to place another ultrasound IV in. Pt agreed to get another IV placed. Lily BARNES was able to place another IV via ultrasound.  "

## 2021-12-08 NOTE — CONSULTS
.  [facility] Progress Note    Service: [facility]  Patient's Name: Jana Overton  MRN: [id number]  Admit Date:12/7/2021  Today's Date: 12/8/2021   Room #: 2204/02      Identification:  66 y.o. female with 4-5mm R. UVJ stone.     Subjective/ROS:   She reports that pain was not well controlled last night as there was difficulty getting an IV in place. She was given IM dilaudid and pain has become tolerable. She has been NPO. She had night sweats last night but been afebrile. She is not feeling nauseas at this time. She denies dysuria, chest pain, SOB.     Physical Exam:  Current Vitals:   /51   Pulse 71   Temp 36.7 °C (98.1 °F) (Oral)   Resp 16   Ht 1.829 m (6')   Wt 73.6 kg (162 lb 4.1 oz)   SpO2 99%   BMI 22.01 kg/m²     12/06 1900 - 12/08 0659  In: 1000 [I.V.:1000]  Out: 250     GEN : NAD, A&O X4   RES:  no acute respiratory distress  ABD: Soft ND, RLQ TTP  :   R. CVA TTP     Labs:   Recent Labs     12/07/21  1715 12/08/21  0321   SODIUM 137 140   POTASSIUM 3.7 3.6   CHLORIDE 99 106   CO2 18* 20   GLUCOSE 108* 98   BUN 26* 22   CREATININE 1.50* 1.44*   CALCIUM 9.8 8.8     Recent Labs     12/07/21  1715 12/08/21  0321   WBC 11.2* 7.6   RBC 4.58 3.85*   HEMOGLOBIN 14.1 11.7*   HEMATOCRIT 43.1 35.8*   MCV 94.1 93.0   MCH 30.8 30.4   MCHC 32.7* 32.7*   RDW 41.3 40.4   PLATELETCT 157* 169   MPV 10.7 10.3     Lab Results   Component Value Date/Time    GLUCOSE 98 12/08/2021 03:21 AM    GLUCOSE 108 (H) 12/07/2021 05:15 PM    GLUCOSE 94 10/12/2021 12:08 PM    GLUCOSE 93 10/12/2021 12:06 PM       Assessment/Plan  Jana Overton is a 66 y.o. female with 4-5mm R. UVJ stone with hydronephrosis    -Patient to remain NPO in anticipation of cystoscopy, right ureteorscopy, laser lithotripsy with possible stent placement with Dr. Hook. Patient wishes to proceed after discussion of risks and benefits. She understands that if there are signs of an infection that only a stent would be placed and stone treatment  would occur at a later time.  -Contiue ABX per medicine, will follow culture  -Continue pain control per medicine  -Urology to follow.        Eleuterio Campbell, P.A.-C.   [address] [place] [id number]   [phone]

## 2021-12-08 NOTE — ED NOTES
Patient medicated for pain at 7/10 see MAR.  Pending new urinalysis result collected as a straight cath.

## 2021-12-08 NOTE — ASSESSMENT & PLAN NOTE
Imaging shows evidence for a 4 mm right lower ureteric stone just above the ureter of his ankle junction with moderate hydronephrosis  Patient for cystoscopy today  Continue pain management

## 2021-12-08 NOTE — PROGRESS NOTES
Med rec updated and complete  Allergies reviewed  Pt reports no antibiotics in the last 30 days.    No current facility-administered medications on file prior to encounter.     Current Outpatient Medications on File Prior to Encounter   Medication Sig Dispense Refill   • tizanidine (ZANAFLEX) 4 MG Tab Take 2 mg by mouth 2 times a day.     • EPINEPHrine (EPIPEN) 0.3 MG/0.3ML Solution Auto-injector solution for injection Inject 0.3 mg into the shoulder, thigh, or buttocks one time. Indications: Life-Threatening Hypersensitivity Reaction     • Naloxone (NARCAN) 4 MG/0.1ML Liquid Administer 4 mg into affected nostril(S) as needed. Indications: Opioid Overdose     • metoprolol tartrate (LOPRESSOR) 50 MG Tab TAKE 1 TABLET BY MOUTH TWICE DAILY, HOLD IF BLOOD PRESSURE LESS THAN 95/50 (Patient taking differently: Take 50 mg by mouth 2 times a day.) 180 Tablet 2   • prochlorperazine (COMPAZINE) 10 MG Tab Take 1 Tablet by mouth 1 time a day as needed. 10 Tablet 0   • ondansetron (ZOFRAN ODT) 4 MG TABLET DISPERSIBLE Take 1-2 Tablets by mouth every 8 hours as needed for Nausea. 20 Tablet 3   • spironolactone (ALDACTONE) 25 MG Tab Take 2 Tablets by mouth every day. 180 tablet 3   • potassium chloride ER (KLOR-CON) 10 MEQ tablet Take 10 mEq by mouth 1 time a day as needed (with Torsemide).     • oxyCODONE immediate release (ROXICODONE) 10 MG immediate release tablet Take 10 mg by mouth 4 times a day. Indications: Chronic Pain     • torsemide (DEMADEX) 10 MG tablet Take 10-20 mg by mouth 1 time a day as needed (by weight).     • Probiotic Product (PROBIOTIC PO) Take 1 Capsule by mouth every day.

## 2021-12-08 NOTE — PROGRESS NOTES
Called into pts room at 0430 as pt is complaining of increased pain and tearful once more. When assessing pt, IV was noted to be infiltrated again. Notified charge nurse Thu and ultrasound certified nurse Lily. Liyl suggested it would be best if patient were to have midline or central line placed as her PIV access is very limited. Updated MD Dr. Warner of pt increased pain and no IV access. Dr. Warner placed a diet change order for sips with medications. Informed pt that once her oral medication is available once again that it would be given. Pt tearful but verbalized agreement.

## 2021-12-08 NOTE — ASSESSMENT & PLAN NOTE
Multifactorial prerenal and postrenal secondary to obstructing 4 mm right ureteral stone.  \Intravenous fluids  Try to avoid/minimize nephrotoxins, monitor inputs and outputs  Improving, pending cystoscopy with urology

## 2021-12-08 NOTE — OR NURSING
1500 To PACU from OR via bed s/p cystoscopy with right side ureteral stent insertion. Pt sleeping, respirations spontaneous and non-labored via OPA. No string to stent per OR RN.    1507 Pt rouses spontaneously, OPA removed.    1515 Pt reports 6/10 intolerable pain to the RLQ of her abdomen. Pt denies nausea. Oral pain medication administered.     1530 Pt reports 5/10 intolerable pain. Pt denies nausea. IV pain medication administered.     1538 Pt's  updated.     1545 Pt reports 5/10 tolerable pain. Pt denies nausea. Report to [facility] RN.

## 2021-12-08 NOTE — CONSULTS
"Chief complaint-right lower abdominal pain    History of the present illness  66-year-old female with Crohn's disease, she has developed right flank pain and right lower quadrant pain associated with urgency and frequency and frequent voiding worsening over the past 2 days.  Pain has increased.  At times it is the worst pain she is ever experienced.  She did have subjective chills at home but no documented fevers.  She denies foul-smelling urine.    She presented to the emergency room where CT scan demonstrates right hydroureteronephrosis to a 4 to 5 mm calculus just proximal to the right ureteral orifice.  White blood cell count was minimally elevated at 11.2.  Urinalysis on a voided specimen suggested possible urinary infection.  Catheterized specimen was nitrite negative and leukocyte Estrace negative with 10-20 white blood cells and \"moderate\" bacteria.    She had no hypotension.  In the emergency room pain was adequately controlled.    Past medical history    1.  Crohn's disease    2.  Atrial fib/flutter    3 anxiety    4 status post total colectomy with end ileostomy    5.  Chronic pain    6 no prior stone disease    7 narcotic dependency    Surgical history   total colectomy    Lumbar fusion    Multiple back surgeries    Prior breast reduction    Abdominal exploration with lysis of adhesions    Family history    Diabetes, coronary artery disease, Lung disease    Social history; non-smoker rare alcohol socially uses marijuana for pain control    Allergies: Multiple drug allergies including amitriptyline azathioprine infliximab methotrexate morphine promethazine Roxanol Carafate Demerol Duragesic fentanyl lorazepam meperidine methadone pregabalin sulfa betamethasone Celestone sulfasalazine    Current medications EpiPen as needed, naloxone as needed, probiotic, Linda Zoll, Diflucan, Lopressor, Zofran, Roxicodone, potassium, Compazine, Aldactone, Zanaflex, Demadex    Physical examination  Vital signs as outlined " in the EMR she is afebrile she is not tachycardic blood pressure is 124/54   HEENT normocephalic atraumatic PERRLA EOMI    Lungs clear    Cardiac exam regular rate and rhythm    Abdomen is soft there is mild right flank tenderness to percussion    Data White blood cell count 11.2    Catheterized urine nitrate and leukocyte esterase negative, 10-20 white blood cells noted per high-power field, moderate bacteria    CT scan reviewed and as noted above with 4 to 5 mm right distal ureteral calculus with proximal hydroureteronephrosis    Impression    1.  Right ureteral calculus.  She does not appear to be infected with a catheterized urine with negative nitrite and leukocyte Estrace.  She has received ceftriaxone in the emergency room.  I discussed with her the options for medical expulsive therapy/observation versus intervention with stent versus ureteroscopic laser lithotripsy.  At this point we will observe her overnight.  She will be reassessed in the morning.  She will be n.p.o. after midnight in case she elects to proceed with ureteroscopic extraction of her calculus.  The indications risks benefits and alternatives were discussed with her.  She understands no stones of this size will pass spontaneously and she would like to try that as she is comfortable at this time.    Most likely her stone is a combination of chronic dehydration from her Crohn's disease status post total colectomy with ileostomy.    Our urology team will reassess her in the morning and see if intervention is needed/warranted

## 2021-12-08 NOTE — HOSPITAL COURSE
66-year-old female with past medical history of hypertension and Crohn's disease status post colon and rectal resection presenting with abdominal pain.  She was found to have FLORES secondary to right nephrolithiasis with moderate hydronephrosis.  She was started on IV fluids and pain management.  Urology was consulted and patient to go for cystoscopy on 12/8.

## 2021-12-08 NOTE — H&P
[facility] Medicine History & Physical Note    Date of Service  12/7/2021    Primary Care Physician  Chase Charles D.O.    Consultants  Uro Dr Cobos     Code Status  Full Code    Chief Complaint  Chief Complaint   Patient presents with   • Sent by MD   • RLQ Pain   • Back Pain     History of Presenting Illness  Jana Overton is a 66 y.o. female with a past medical history of hypertension and Crohn's disease status post colon and rectal removal who presented 12/7/2021 with right lower quadrant pain.  Patient has been having progressively worsening pain over the past 2 days.  Pain is severe rated as 10 out of 10 no radiation to other places.  No relieving or aggravating factors.  She does report associated generalized weakness fevers and chills in addition to having a dark urine.  She also has been having nausea.  Patient has an ileostomy and noticed that her output has slightly decreased as she has not been tolerating oral intake well.    I discussed the plan of care with emergency department physician, and the patient.    Review of Systems  Review of Systems   Constitutional: Positive for chills, fever and malaise/fatigue.   Eyes: Negative for discharge and redness.   Respiratory: Negative for cough, shortness of breath and stridor.    Cardiovascular: Negative for chest pain and leg swelling.   Gastrointestinal: Negative for abdominal pain and vomiting.   Genitourinary: Positive for flank pain.   Musculoskeletal: Negative for myalgias.   Skin: Negative.    Neurological: Negative for focal weakness.   Endo/Heme/Allergies: Does not bruise/bleed easily.   Psychiatric/Behavioral: The patient is not nervous/anxious.      Past Medical History   has a past medical history of Anesthesia, Anxiety, Arthritis, ASTHMA, Atrial fib/flut, Backpain, Breath shortness, Bronchitis, Chronic pain, Colostomy in place ([facility]) (10/14/2010), COPD (chronic obstructive pulmonary disease) ([facility]) (6/24/2014), COPD (chronic obstructive  pulmonary disease) ([facility]) (6/24/2014), Crohn's disease of colon ([facility]), Dyspnea, History of cardiac murmur, Hypokalemia (6/24/2014), Indigestion, Infectious disease, Multiple falls (6/24/2014), Narcotic dependence ([facility]) (9/23/2009), Obstruction, Other specified disorder of intestines, Pain (7/11/13), Pneumonia, Postherpetic neuralgia (6/24/2014), Rosacea (6/24/2014), and Sleep apnea.    Surgical History   has a past surgical history that includes lumbar fusion anterior; cervical disk and fusion anterior; foot surgery; hand surgery; other abdominal surgery; gastroscopy with biopsy (11/22/08); ileostomy (11/11/2009); dilation and curettage (9/24/2010); gastroscopy (10/4/2011); colonoscopy (10/4/2011); hardware removal neuro (7/16/2012); mammoplasty reduction (7/17/2013); ileostomy (5/14/2014); pr breast reduction; abdominal exploration; lumpectomy; colon resection; ileostomy (12/29/2018); sigmoidoscopy flex (12/29/2018); exploratory laparotomy (12/29/2018); gastroscopy (1/6/2019); gastroscopy (N/A, 5/22/2019); ileostomy (1/20/2021); and lysis adhesions general (1/20/2021).     Family History  family history includes Cancer (age of onset: 40) in her maternal aunt; Diabetes in her father and sister; Heart Disease in her father; Lung Disease in her mother.      Social History   reports that she has never smoked. She has never used smokeless tobacco. She reports current alcohol use of about 0.6 oz of alcohol per week. She reports current drug use. Drugs: Marijuana and Inhaled.    Allergies  Allergies   Allergen Reactions   • Amitriptyline Unspecified     Nightmares     • Azathioprine Sodium Hives and Shortness of Breath   • Infliximab Hives, Shortness of Breath and Rash     .   • Methotrexate Hives, Shortness of Breath and Rash     .   • Methotrexate Hives, Rash and Shortness of Breath     .   • Morphine Shortness of Breath, Swelling and Palpitations   • Promethazine Shortness of Breath and Rash     .   • Roxanol [Morphine  "Sulfate] Swelling     Throat swells   • Carafate [Sucralfate] Vomiting and Nausea     Generalizes/Mouth Sores   • Demerol Vomiting   • Duragesic [Fentanyl]      \"Patches didn't work\"  Skin broke down   • Fentanyl Unspecified     Patches caused skin breakdown, no pain relief   • Lorazepam Unspecified     States give me nightmares.    • Meperidine Vomiting   • Methadone Unspecified     Didn't work   • Methadone Unspecified     Didn't work   • Other Drug Unspecified     steroids   • Pregabalin Rash     Rash     • Pseudoephedrine Vomiting   • Sulfa Drugs Hives and Rash     .  .   • Betamethasone Unspecified     Pt unable to remember   • Celestone [Betamethasone Sodium Phosphate] Unspecified     Pt unable to remember   • Sulfasalazine Rash     On chest     Medications  Prior to Admission Medications   Prescriptions Last Dose Informant Patient Reported? Taking?   EPINEPHrine (EPIPEN) 0.3 MG/0.3ML Solution Auto-injector solution for injection   Yes No   Sig: INJECT INTO THE MUSCLE EVERY 10 MINUTES AS NEEDED FOR ANAPHYLAXSIS   Naloxone (NARCAN) 4 MG/0.1ML Liquid   Yes No   Sig: Administer 4 mg into affected nostril(S).   Patient not taking: Reported on 10/28/2021   Probiotic Product (PROBIOTIC PO)  Patient Yes No   Sig: Take 1 Cap by mouth every evening.   baclofen (LIORESAL) 10 MG Tab   Yes No   Sig: TAKE 1 TABLET BY MOUTH DAILY AS NEEDED FOR MUSCLE PAIN OR SPASMS   fluconazole (DIFLUCAN) 40 MG/ML suspension   Yes No   Patient not taking: Reported on 10/28/2021   metoprolol tartrate (LOPRESSOR) 50 MG Tab   No No   Sig: TAKE 1 TABLET BY MOUTH TWICE DAILY, HOLD IF BLOOD PRESSURE LESS THAN 95/50   ondansetron (ZOFRAN ODT) 4 MG TABLET DISPERSIBLE   No No   Sig: Take 1-2 Tablets by mouth every 8 hours as needed for Nausea.   ondansetron (ZOFRAN) 4 MG Tab tablet   Yes No   Sig: Take 4 mg by mouth.   oxyCODONE immediate release (ROXICODONE) 10 MG immediate release tablet  Patient Yes No   Sig: Take 10 mg by mouth every 6 hours. " Indications: Chronic Pain   potassium chloride ER (KLOR-CON) 10 MEQ tablet  Patient Yes No   Sig: Take 10 mEq by mouth 1 time a day as needed (with Torsemide).   prochlorperazine (COMPAZINE) 10 MG Tab   No No   Sig: Take 1 Tablet by mouth 1 time a day as needed.   spironolactone (ALDACTONE) 25 MG Tab   No No   Sig: Take 2 Tablets by mouth every day.   tizanidine (ZANAFLEX) 4 MG Tab   Yes No   Sig: Take 2 mg by mouth 2 times a day.   torsemide (DEMADEX) 10 MG tablet  Patient Yes No   Sig: Take 10-20 mg by mouth 1 time a day as needed (by weight).      Facility-Administered Medications: None     Physical Exam  Temp:  [35.8 °C (96.5 °F)-36.8 °C (98.2 °F)] 36.8 °C (98.2 °F)  Pulse:  [69-87] 70  Resp:  [18-22] 18  BP: (120-180)/(54-95) 124/54  SpO2:  [96 %-97 %] 97 %  Blood Pressure : (!) 161/70   Temperature: 36.8 °C (98.2 °F)   Pulse: 74   Respiration: 18   Pulse Oximetry: 96 %     Physical Exam  Constitutional:       General: She is not in acute distress.  HENT:      Head: Normocephalic and atraumatic.      Right Ear: External ear normal.      Left Ear: External ear normal.      Nose: No congestion or rhinorrhea.      Mouth/Throat:      Mouth: Mucous membranes are dry.      Pharynx: No oropharyngeal exudate or posterior oropharyngeal erythema.   Eyes:      General: No scleral icterus.        Right eye: No discharge.         Left eye: No discharge.      Conjunctiva/sclera: Conjunctivae normal.      Pupils: Pupils are equal, round, and reactive to light.   Cardiovascular:      Rate and Rhythm: Tachycardia present.      Heart sounds: No friction rub. No gallop.    Pulmonary:      Effort: Pulmonary effort is normal.   Abdominal:      General: Abdomen is flat. There is no distension.      Tenderness: There is no guarding.   Musculoskeletal:         General: No swelling.      Cervical back: Neck supple. No rigidity. No muscular tenderness.      Right lower leg: No edema.      Left lower leg: No edema.   Skin:     General:  Skin is dry.      Capillary Refill: Capillary refill takes 2 to 3 seconds.      Coloration: Skin is pale. Skin is not jaundiced.      Findings: No bruising or erythema.   Neurological:      Mental Status: She is alert and oriented to person, place, and time.   Psychiatric:         Judgment: Judgment normal.      Comments: Anxious       Laboratory:  Recent Labs     12/07/21  1715   WBC 11.2*   RBC 4.58   HEMOGLOBIN 14.1   HEMATOCRIT 43.1   MCV 94.1   MCH 30.8   MCHC 32.7*   RDW 41.3   PLATELETCT 157*   MPV 10.7     Recent Labs     12/07/21  1715   SODIUM 137   POTASSIUM 3.7   CHLORIDE 99   CO2 18*   GLUCOSE 108*   BUN 26*   CREATININE 1.50*   CALCIUM 9.8     Recent Labs     12/07/21  1715   ALTSGPT 19   ASTSGOT 22   ALKPHOSPHAT 128*   TBILIRUBIN 0.4   LIPASE 14   GLUCOSE 108*         No results for input(s): NTPROBNP in the last 72 hours.      No results for input(s): TROPONINT in the last 72 hours.    Imaging:  CT-RENAL COLIC EVALUATION(A/P W/O)   Final Result      1.  4 mm right lower ureteral stone present just above the ureterovesical junction. There is moderate hydronephrosis seen above that level.      2.  Surgical change consistent with prior colectomy. There is a left lower quadrant ostomy site. No evidence of bowel obstruction or focal inflammatory change.        Assessment/Plan:  I anticipate this patient is appropriate for observation status at this time.    * FLORES (acute kidney injury) (HCC)- (present on admission)  Assessment & Plan  Multifactorial prerenal and postrenal secondary to obstructing 4 mm right ureteral stone.  \Intravenous fluids  Try to avoid/minimize nephrotoxins, monitor inputs and outputs  Urology, Dr. Cobos, consulted to consider lithotripsy    Ureteric stone- (present on admission)  Assessment & Plan  Imaging shows evidence for a 4 mm right lower ureteric stone just above the ureter of his ankle junction with moderate hydronephrosis  Urology, Dr. Cobos consulted, will likely need  lithotripsy  Multimodal pain control, n.p.o. at midnight    Acute cystitis without hematuria- (present on admission)  Assessment & Plan  Started on ceftriaxone in the emergency room.  Continue for now follow cultures and sensitivities    Essential hypertension- (present on admission)  Assessment & Plan  Resume home metoprolol and spironolactone with hold parameters.  We will hold home furosemide given her acute kidney injury and dehydration.    VTE prophylaxis: SCDs/TEDs

## 2021-12-08 NOTE — ED NOTES
IV attempted unsuccessful x 1.  Obtained a few sample of blood.  Radha BARNES to try using the ultrasound machine

## 2021-12-08 NOTE — ED PROVIDER NOTES
CHIEF COMPLAINT  Chief Complaint   Patient presents with   • Sent by MD   • RLQ Pain   • Back Pain       HPI  Jana Overton is a 66 y.o. female with a history of Crohn's disease, COPD, A. fib and ileostomy who presents with right lower quadrant pain for 2 days.  She was sent in by Dr. Charles who was concerned she may have a kidney stone.  She does report some blood in her urine-it seems like it is mostly dark urine and cloudy.  No fevers.  No burning with urination.  Output from her ostomy has been relatively normal.  There has been some nausea.  No diarrhea.  No cough or shortness of breath or chest pain.    REVIEW OF SYSTEMS  All other systems are negative.     PAST MEDICAL HISTORY  Past Medical History:   Diagnosis Date   • Anesthesia     difficult Iv stick   • Anxiety    • Arthritis     all over   • ASTHMA    • Atrial fib/flut    • Backpain    • Breath shortness    • Bronchitis     5 years   • Chronic pain    • Colostomy in place ([facility]) 10/14/2010   • COPD (chronic obstructive pulmonary disease) ([facility]) 6/24/2014   • COPD (chronic obstructive pulmonary disease) ([facility]) 6/24/2014   • Crohn's disease of colon ([facility])    • Dyspnea    • History of cardiac murmur    • Hypokalemia 6/24/2014   • Indigestion    • Infectious disease     MRSA, VancoRSA   • Multiple falls 6/24/2014   • Narcotic dependence ([facility]) 9/23/2009   • Obstruction    • Other specified disorder of intestines     crohns disease   • Pain 7/11/13    all over   • Pneumonia     child and mid 30's   • Postherpetic neuralgia 6/24/2014   • Rosacea 6/24/2014   • Sleep apnea        FAMILY HISTORY  Family History   Problem Relation Age of Onset   • Lung Disease Mother         copd   • Diabetes Father    • Heart Disease Father    • Diabetes Sister    • Cancer Maternal Aunt 40        breast       SOCIAL HISTORY  Social History     Socioeconomic History   • Marital status:      Spouse name: Not on file   • Number of children: Not on file   • Years of  education: Not on file   • Highest education level: Associate degree: academic program   Occupational History   • Not on file   Tobacco Use   • Smoking status: Never Smoker   • Smokeless tobacco: Never Used   • Tobacco comment: second hand smoke parents - smoked for only 1 year many years ago   Vaping Use   • Vaping Use: Never used   Substance and Sexual Activity   • Alcohol use: Yes     Alcohol/week: 0.6 oz     Types: 1 Shots of liquor per week     Comment: gin and half a lime; tonic water   • Drug use: Yes     Types: Marijuana, Inhaled     Comment: medical marijuana through bong   • Sexual activity: Not on file     Comment:    Other Topics Concern   • Not on file   Social History Narrative   • Not on file     Social Determinants of Health     Financial Resource Strain: Low Risk    • Difficulty of Paying Living Expenses: Not very hard   Food Insecurity: No Food Insecurity   • Worried About Running Out of Food in the Last Year: Never true   • Ran Out of Food in the Last Year: Never true   Transportation Needs: No Transportation Needs   • Lack of Transportation (Medical): No   • Lack of Transportation (Non-Medical): No   Physical Activity: Sufficiently Active   • Days of Exercise per Week: 7 days   • Minutes of Exercise per Session: 60 min   Stress: No Stress Concern Present   • Feeling of Stress : Not at all   Social Connections: Socially Integrated   • Frequency of Communication with Friends and Family: More than three times a week   • Frequency of Social Gatherings with Friends and Family: Once a week   • Attends [employer]ical Services: More than 4 times per year   • Active Member of Clubs or Organizations: Yes   • Attends Club or Organization Meetings: More than 4 times per year   • Marital Status:    Intimate Partner Violence:    • Fear of Current or Ex-Partner: Not on file   • Emotionally Abused: Not on file   • Physically Abused: Not on file   • Sexually Abused: Not on file   Housing Stability: Low  Risk    • Unable to Pay for Housing in the Last Year: No   • Number of Places Lived in the Last Year: 1   • Unstable Housing in the Last Year: No       SURGICAL HISTORY  Past Surgical History:   Procedure Laterality Date   • ILEOSTOMY  1/20/2021    Procedure: ILEOSTOMY- COMPLEX REVISION,;  Surgeon: Kevin Cruz M.D.;  Location: [facility];  Service: General   • LYSIS ADHESIONS GENERAL  1/20/2021    Procedure: LYSIS, ADHESIONS;  Surgeon: Kevin Cruz M.D.;  Location: SURGERY [facility];  Service: General   • GASTROSCOPY N/A 5/22/2019    Procedure: GASTROSCOPY - WITH DILATION;  Surgeon: Dionicio Cardona M.D.;  Location: SURGERY [facility];  Service: Gastroenterology   • GASTROSCOPY  1/6/2019    Procedure: GASTROSCOPY- WITH DILATION;  Surgeon: Daniel Daniels M.D.;  Location: SURGERY [facility];  Service: Gastroenterology   • ILEOSTOMY  12/29/2018    Procedure: ILEOSTOMY- REVISION;  Surgeon: Kevin Cruz M.D.;  Location: SURGERY [facility];  Service: General   • SIGMOIDOSCOPY FLEX  12/29/2018    Procedure: SIGMOIDOSCOPY FLEX;  Surgeon: Kevin Cruz M.D.;  Location: SURGERY [facility];  Service: General   • EXPLORATORY LAPAROTOMY  12/29/2018    Procedure: EXPLORATORY LAPAROTOMY, lysis of adhesions;  Surgeon: Kevin Cruz M.D.;  Location: [facility];  Service: General   • ILEOSTOMY  5/14/2014    Performed by Kevin Cruz M.D. at [facility] ORS   • MAMMOPLASTY REDUCTION  7/17/2013    Performed by Jnaey Burt M.D. at [facility] ORS   • HARDWARE REMOVAL NEURO  7/16/2012    Performed by KEELEY KIM at SURGERY [facility] ORS   • GASTROSCOPY  10/4/2011    Performed by TYSON MARTINEZ at SURGERY [facility] DAY [facility] ORS   • COLONOSCOPY  10/4/2011    Performed by TYSON MARTINEZ at SURGERY SAME DAY [facility] ORS   • DILATION AND CURETTAGE  9/24/2010    Performed by GAURAV ALY at SURGERY SAME DAY [facility] ORS   • ILEOSTOMY   "11/11/2009    Performed by SYDNEE AUGUSTINE at SURGERY [facility] ORS   • GASTROSCOPY WITH BIOPSY  11/22/08    Performed by NEGRITA HUYNH at SURGERY [facility] ORS   • ABDOMINAL EXPLORATION     • CERVICAL DISK AND FUSION ANTERIOR     • COLON RESECTION     • FOOT SURGERY     • HAND SURGERY     • LUMBAR FUSION ANTERIOR     • LUMPECTOMY     • OTHER ABDOMINAL SURGERY      surgery for chrons disease   • CT REDUCTION OF BREAST         CURRENT MEDICATIONS  Home Medications    **Home medications have not yet been reviewed for this encounter**         ALLERGIES  Allergies   Allergen Reactions   • Amitriptyline Unspecified     Nightmares     • Azathioprine Sodium Hives and Shortness of Breath   • Infliximab Hives, Shortness of Breath and Rash     .   • Methotrexate Hives, Shortness of Breath and Rash     .   • Methotrexate Hives, Rash and Shortness of Breath     .   • Morphine Shortness of Breath, Swelling and Palpitations   • Promethazine Shortness of Breath and Rash     .   • Roxanol [Morphine Sulfate] Swelling     Throat swells   • Carafate [Sucralfate] Vomiting and Nausea     Generalizes/Mouth Sores   • Demerol Vomiting   • Duragesic [Fentanyl]      \"Patches didn't work\"  Skin broke down   • Fentanyl Unspecified     Patches caused skin breakdown, no pain relief   • Lorazepam Unspecified     States give me nightmares.    • Meperidine Vomiting   • Methadone Unspecified     Didn't work   • Methadone Unspecified     Didn't work   • Other Drug Unspecified     steroids   • Pregabalin Rash     Rash     • Pseudoephedrine Vomiting   • Sulfa Drugs Hives and Rash     .  .   • Betamethasone Unspecified     Pt unable to remember   • Celestone [Betamethasone Sodium Phosphate] Unspecified     Pt unable to remember   • Sulfasalazine Rash     On chest       PHYSICAL EXAM  VITAL SIGNS: BP (!) 180/95   Pulse 87   Temp 35.8 °C (96.5 °F) (Temporal)   Resp 18   Ht 1.829 m (6')   Wt 71.8 kg (158 lb 4.6 oz)   SpO2 96%   BMI 21.47 kg/m²  "     Constitutional: Well developed, Well nourished, No acute distress, Non-toxic appearance.   HENT: Normocephalic, Atraumatic, mucous membranes moist, no erythema, exudates, swelling, or masses, nares patent  Eyes: nonicteric  Neck: Supple, no meningismus  Lymphatic: No lymphadenopathy noted.   Cardiovascular: Regular rate and rhythm, no gallops rubs or murmurs  Lungs: Clear bilaterally   Abdomen: Soft throughout, there is mild tenderness in the right lower quadrant, no rebound or guarding, no significant distention, ostomy is in place  Skin: Warm, Dry, no rash  Back: No tenderness, No CVA tenderness.   Genitalia: Deferred  Rectal: Deferred  Extremities: No edema  Neurologic: Alert, appropriate, follows commands, moving all extremities, normal speech   Psychiatric: Affect normal    Results for orders placed or performed during the hospital encounter of 12/07/21   CBC WITH DIFFERENTIAL   Result Value Ref Range    WBC 11.2 (H) 4.8 - 10.8 K/uL    RBC 4.58 4.20 - 5.40 M/uL    Hemoglobin 14.1 12.0 - 16.0 g/dL    Hematocrit 43.1 37.0 - 47.0 %    MCV 94.1 81.4 - 97.8 fL    MCH 30.8 27.0 - 33.0 pg    MCHC 32.7 (L) 33.6 - 35.0 g/dL    RDW 41.3 35.9 - 50.0 fL    Platelet Count 157 (L) 164 - 446 K/uL    MPV 10.7 9.0 - 12.9 fL    Neutrophils-Polys 70.90 44.00 - 72.00 %    Lymphocytes 21.10 (L) 22.00 - 41.00 %    Monocytes 5.90 0.00 - 13.40 %    Eosinophils 1.10 0.00 - 6.90 %    Basophils 0.60 0.00 - 1.80 %    Immature Granulocytes 0.40 0.00 - 0.90 %    Nucleated RBC 0.00 /100 WBC    Neutrophils (Absolute) 7.97 (H) 2.00 - 7.15 K/uL    Lymphs (Absolute) 2.37 1.00 - 4.80 K/uL    Monos (Absolute) 0.66 0.00 - 0.85 K/uL    Eos (Absolute) 0.12 0.00 - 0.51 K/uL    Baso (Absolute) 0.07 0.00 - 0.12 K/uL    Immature Granulocytes (abs) 0.04 0.00 - 0.11 K/uL    NRBC (Absolute) 0.00 K/uL   COMP METABOLIC PANEL   Result Value Ref Range    Sodium 137 135 - 145 mmol/L    Potassium 3.7 3.6 - 5.5 mmol/L    Chloride 99 96 - 112 mmol/L    Co2 18  (L) 20 - 33 mmol/L    Anion Gap 20.0 (H) 7.0 - 16.0    Glucose 108 (H) 65 - 99 mg/dL    Bun 26 (H) 8 - 22 mg/dL    Creatinine 1.50 (H) 0.50 - 1.40 mg/dL    Calcium 9.8 8.4 - 10.2 mg/dL    AST(SGOT) 22 12 - 45 U/L    ALT(SGPT) 19 2 - 50 U/L    Alkaline Phosphatase 128 (H) 30 - 99 U/L    Total Bilirubin 0.4 0.1 - 1.5 mg/dL    Albumin 4.4 3.2 - 4.9 g/dL    Total Protein 8.2 6.0 - 8.2 g/dL    Globulin 3.8 (H) 1.9 - 3.5 g/dL    A-G Ratio 1.2 g/dL   LIPASE   Result Value Ref Range    Lipase 14 7 - 58 U/L   URINALYSIS    Specimen: Urine, Clean Catch   Result Value Ref Range    Color Yellow     Character Clear     Specific Gravity 1.025 <1.035    Ph 6.0 5.0 - 8.0    Glucose Negative Negative mg/dL    Ketones Negative Negative mg/dL    Protein Negative Negative mg/dL    Bilirubin Negative Negative    Nitrite Positive (A) Negative    Leukocyte Esterase Trace (A) Negative    Occult Blood Trace (A) Negative    Micro Urine Req Microscopic    URINE MICROSCOPIC (W/UA)   Result Value Ref Range    WBC 5-10 (A) /hpf    RBC 5-10 (A) /hpf    Bacteria Few (A) None /hpf    Epithelial Cells Few Few /hpf    Hyaline Cast 0-2 /lpf   ESTIMATED GFR   Result Value Ref Range    GFR If  42 (A) >60 mL/min/1.73 m 2    GFR If Non  34 (A) >60 mL/min/1.73 m 2   URINALYSIS (UA)    Specimen: Urine   Result Value Ref Range    Color Yellow     Character Sl Cloudy (A)     Specific Gravity 1.025 <1.035    Ph 5.5 5.0 - 8.0    Glucose Negative Negative mg/dL    Ketones Negative Negative mg/dL    Protein Negative Negative mg/dL    Bilirubin Negative Negative    Nitrite Negative Negative    Leukocyte Esterase Negative Negative    Occult Blood Trace (A) Negative    Micro Urine Req Microscopic    URINE MICROSCOPIC (W/UA)   Result Value Ref Range    WBC 10-20 (A) /hpf    RBC 0-2 /hpf    Bacteria Moderate (A) None /hpf    Epithelial Cells Rare Few /hpf    Mucous Threads Few /hpf         RADIOLOGY/PROCEDURES  CT-RENAL COLIC  EVALUATION(A/P W/O)   Final Result      1.  4 mm right lower ureteral stone present just above the ureterovesical junction. There is moderate hydronephrosis seen above that level.      2.  Surgical change consistent with prior colectomy. There is a left lower quadrant ostomy site. No evidence of bowel obstruction or focal inflammatory change.            COURSE & MEDICAL DECISION MAKING  Pertinent Labs & Imaging studies reviewed. (See chart for details)  This is a 66-year-old female who presents with a 4 mm distal ureteral stone on the right side.  She also has white cells and bacteria in her urine.  Patient has a borderline elevated white count.  Vitals are reassuring.  She does have electrolytes consistent with dehydration with a low bicarbonate and increased creatinine at 1.5.  Case discussed with Dr. Cobos over the phone.  We will admit the patient for hydration and antibiotics.  He will consult for what appears to be a probable infected ureteral stone.    FINAL IMPRESSION  1.  Ureterolithiasis  2.  Acute UTI  3.  Dehydration         Electronically signed by: Dino Arroyo M.D., 12/7/2021 5:08 PM

## 2021-12-08 NOTE — H&P (VIEW-ONLY)
.  [facility] [place] Progress Note    Service: [facility] [place]  Patient's Name: Jana Overton  MRN: [id number]  Admit Date:12/7/2021  Today's Date: 12/8/2021   Room #: 2204/02      Identification:  66 y.o. female with 4-5mm R. UVJ stone.     Subjective/ROS:   She reports that pain was not well controlled last night as there was difficulty getting an IV in place. She was given IM dilaudid and pain has become tolerable. She has been NPO. She had night sweats last night but been afebrile. She is not feeling nauseas at this time. She denies dysuria, chest pain, SOB.     Physical Exam:  Current Vitals:   /51   Pulse 71   Temp 36.7 °C (98.1 °F) (Oral)   Resp 16   Ht 1.829 m (6')   Wt 73.6 kg (162 lb 4.1 oz)   SpO2 99%   BMI 22.01 kg/m²     12/06 1900 - 12/08 0659  In: 1000 [I.V.:1000]  Out: 250     GEN : NAD, A&O X4   RES:  no acute respiratory distress  ABD: Soft ND, RLQ TTP  :   R. CVA TTP     Labs:   Recent Labs     12/07/21  1715 12/08/21  0321   SODIUM 137 140   POTASSIUM 3.7 3.6   CHLORIDE 99 106   CO2 18* 20   GLUCOSE 108* 98   BUN 26* 22   CREATININE 1.50* 1.44*   CALCIUM 9.8 8.8     Recent Labs     12/07/21  1715 12/08/21  0321   WBC 11.2* 7.6   RBC 4.58 3.85*   HEMOGLOBIN 14.1 11.7*   HEMATOCRIT 43.1 35.8*   MCV 94.1 93.0   MCH 30.8 30.4   MCHC 32.7* 32.7*   RDW 41.3 40.4   PLATELETCT 157* 169   MPV 10.7 10.3     Lab Results   Component Value Date/Time    GLUCOSE 98 12/08/2021 03:21 AM    GLUCOSE 108 (H) 12/07/2021 05:15 PM    GLUCOSE 94 10/12/2021 12:08 PM    GLUCOSE 93 10/12/2021 12:06 PM       Assessment/Plan  Jana Overton is a 66 y.o. female with 4-5mm R. UVJ stone with hydronephrosis    -Patient to remain NPO in anticipation of cystoscopy, right ureteorscopy, laser lithotripsy with possible stent placement with Dr. Hook. Patient wishes to proceed after discussion of risks and benefits. She understands that if there are signs of an infection that only a stent would be placed and stone treatment  would occur at a later time.  -Contiue ABX per medicine, will follow culture  -Continue pain control per medicine  -Urology to follow.        Eleuterio Campbell, P.A.-C.   [address] [place] [id number]   [phone]

## 2021-12-08 NOTE — ASSESSMENT & PLAN NOTE
Resume home metoprolol and spironolactone with hold parameters.  We will hold home furosemide given her acute kidney injury and dehydration.

## 2021-12-08 NOTE — PROGRESS NOTES
4 Eyes Skin Assessment Completed by Johann RN and Brenden RN.    Head WDL  Ears WDL  Nose WDL  Mouth WDL  Neck WDL  Breast/Chest WDL  Shoulder Blades WDL  Spine WDL  (R) Arm/Elbow/Hand WDL  (L) Arm/Elbow/Hand WDL  Abdomen ileostomy in place  Groin WDL  Scrotum/Coccyx/Buttocks WDL  (R) Leg WDL  (L) Leg WDL  (R) Heel/Foot/Toe WDL  (L) Heel/Foot/Toe WDL          Devices In Places Pulse Ox      Interventions In Place N/A    Possible Skin Injury No    Pictures Uploaded Into Epic N/A  Wound Consult Placed N/A  RN Wound Prevention Protocol Ordered No

## 2021-12-08 NOTE — PROGRESS NOTES
Pt admitted to 204-2 from ED. Pt is A & O X4. Complaining of pain in the abdomen that radiates to the back at an 8 on the 0 to 10 scale. Pt IV was not able to be flushed. Andriy BARNES from ED in to place new IV via ultrasound. New IV placed. Pt was then medicated per MAR and fluids were started. Ileostomy on left side of abdomen. Explained to pt the plan for the night including fluids and pain management. No other needs at this time. Call light in reach, bed in lowest position.

## 2021-12-08 NOTE — OR SURGEON
Immediate Post OP Note    PreOp Diagnosis: Right flank pain                                Right distal ureteral stone                                UTI      PostOp Diagnosis: Right obstructing pyonephrosis                                  Right distal ureteral stone      Procedure(s):  CYSTOSCOPY  RIGHT URETERAL STENT INSERTION - Wound Class: Contaminated      Surgeon(s):  Luc Hook M.D.    Anesthesiologist/Type of Anesthesia:  Anesthesiologist: Ovi Frazier D.O./General LMA    Surgical Staff:  Circulator: Scott Bustamante R.N.  Laser Staff: Claira J Schwab  Scrub Person: Moo Blue  Radiology Technologist: Luc Bill    Specimens removed if any:  None    Estimated Blood Loss:N/A    Findings: High grade obstruction of the right ureter with pus draining under pressure with guide wire and stent placement    Complications: None  Drains: 6 Yakut x 28 cm stent in the right ureter.        12/8/2021 2:50 PM Luc Hook M.D.

## 2021-12-08 NOTE — DISCHARGE PLANNING
ANTICIPATED DISCHARGE DISPOSITION: Home    ACTION: Chart reviewed; patient discussed in IDT rounds. Patient is NPO for anticpated cystoscopy. MD anticipated discharge today or tomorrow pending post procedure status. 6-clicks score 24. Patient has met acute criteria in IQ. MD notified and due to pending procedure will follow up post procedure unable to determine to roll IP at this time.      BARRIERS TO DISCHARGE: Pending cystoscopy, medical clearance.    PLAN: Monitor for medical clearance.

## 2021-12-08 NOTE — PROGRESS NOTES
CC: Jana Overton is a 66 y.o. female is suffering from   Chief Complaint   Patient presents with   • Follow-Up     lab, mammo   • Blood in Urine   • Emesis     all day Sunday/Monday   • Abdominal Pain     right lower         SUBJECTIVE:  1. Abnormal laboratory test  Jana is here for follow-up, has a history of abnormal laboratory test we have been concerned about her possibly having an undiagnosed lupus    2. Right lower quadrant pain  Patient with acute right lower quadrant pain x3 days Intensity 7-8/10, quality sharp always dull achy, radiation localized, associated signs and symptoms nausea vomiting, time component doesnt matter, improved with layz, worse with laying on her side, patient with previous appendectomy colectomy secondary to Crohn's disease.  Suspect probable kidney stone..            Past social, family, history: , [place]  Social History     Tobacco Use   • Smoking status: Never Smoker   • Smokeless tobacco: Never Used   • Tobacco comment: second hand smoke parents - smoked for only 1 year many years ago   Vaping Use   • Vaping Use: Never used   Substance Use Topics   • Alcohol use: Yes     Alcohol/week: 0.6 oz     Types: 1 Shots of liquor per week     Comment: gin and half a lime; tonic water   • Drug use: Yes     Types: Marijuana, Inhaled     Comment: medical marijuana through bon         MEDICATIONS:  No current facility-administered medications for this visit.    Current Outpatient Medications:   •  tizanidine (ZANAFLEX) 4 MG Tab, Take 2 mg by mouth 2 times a day., Disp: , Rfl:   •  EPINEPHrine (EPIPEN) 0.3 MG/0.3ML Solution Auto-injector solution for injection, INJECT INTO THE MUSCLE EVERY 10 MINUTES AS NEEDED FOR ANAPHYLAXSIS, Disp: , Rfl:   •  ondansetron (ZOFRAN) 4 MG Tab tablet, Take 4 mg by mouth., Disp: , Rfl:   •  baclofen (LIORESAL) 10 MG Tab, TAKE 1 TABLET BY MOUTH DAILY AS NEEDED FOR MUSCLE PAIN OR SPASMS, Disp: , Rfl:   •  metoprolol tartrate (LOPRESSOR) 50 MG Tab, TAKE 1  TABLET BY MOUTH TWICE DAILY, HOLD IF BLOOD PRESSURE LESS THAN 95/50, Disp: 180 Tablet, Rfl: 2  •  prochlorperazine (COMPAZINE) 10 MG Tab, Take 1 Tablet by mouth 1 time a day as needed., Disp: 10 Tablet, Rfl: 0  •  ondansetron (ZOFRAN ODT) 4 MG TABLET DISPERSIBLE, Take 1-2 Tablets by mouth every 8 hours as needed for Nausea., Disp: 20 Tablet, Rfl: 3  •  spironolactone (ALDACTONE) 25 MG Tab, Take 2 Tablets by mouth every day., Disp: 180 tablet, Rfl: 3  •  potassium chloride ER (KLOR-CON) 10 MEQ tablet, Take 10 mEq by mouth 1 time a day as needed (with Torsemide)., Disp: , Rfl:   •  oxyCODONE immediate release (ROXICODONE) 10 MG immediate release tablet, Take 10 mg by mouth every 6 hours. Indications: Chronic Pain, Disp: , Rfl:   •  torsemide (DEMADEX) 10 MG tablet, Take 10-20 mg by mouth 1 time a day as needed (by weight)., Disp: , Rfl:   •  fluconazole (DIFLUCAN) 40 MG/ML suspension, , Disp: , Rfl:   •  Naloxone (NARCAN) 4 MG/0.1ML Liquid, Administer 4 mg into affected nostril(S). (Patient not taking: Reported on 10/28/2021), Disp: , Rfl:   •  Probiotic Product (PROBIOTIC PO), Take 1 Cap by mouth every evening., Disp: , Rfl:     Facility-Administered Medications Ordered in Other Visits:   •  HYDROmorphone (Dilaudid) injection 0.5 mg, 0.5 mg, Intravenous, Once, Dino Arroyo M.D.  •  ondansetron (ZOFRAN) syringe/vial injection 4 mg, 4 mg, Intravenous, Once, Dino Arroyo M.D.    PROBLEMS:  Patient Active Problem List    Diagnosis Date Noted   • Migraine 06/04/2019   • Essential hypertension 05/20/2019   • DVT (deep venous thrombosis) ([facility]) 12/31/2018   • History of MRSA infection 12/29/2018   • History of infection with vancomycin resistant Enterococcus (VRE) 12/29/2018   • 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[facility]) 12/28/2018   • Dysphasia 12/28/2018   • Anemia 12/15/2018   • Thrush of mouth and esophagus (HCC) 12/13/2018   • Liver enzyme elevation 12/12/2018   • Leukocytosis 12/12/2018   • Chronic respiratory failure with hypoxia (HCC)  03/20/2018   • Anxiety disorder due to multiple medical problems 12/01/2017   • DDD (degenerative disc disease), lumbar 04/12/2017   • History of DVT (deep vein thrombosis) 11/23/2016   • Paroxysmal atrial fibrillation ([facility]) 03/26/2015   • Sleep apnea 10/26/2014   • Postherpetic neuralgia 06/24/2014   • Multiple falls 06/24/2014   • COPD (chronic obstructive pulmonary disease) ([facility]) 06/24/2014   • Rosacea 06/24/2014   • Ileostomy in place ([facility]) 06/26/2013   • GERD (gastroesophageal reflux disease) 12/05/2012   • Status post lumbar surgery 07/16/2012   • Crohn's disease of small intestine with complication ([facility]) 09/23/2009   • Chronic pain 09/23/2009   • Opioid type dependence, continuous (CMS-HCC) 09/23/2009       REVIEW OF SYSTEMS:  Gen.:  No Nausea, Vomiting, fever, Chills.  Heart: No chest pain.  Lungs:  No shortness of Breath.  Psychological: Rg unusual Anxiety depression     PHYSICAL EXAM   Constitutional: Alert, cooperative, not in acute distress.  Cardiovascular:  Rate Rhythm is regular without murmurs rubs clicks.     Thorax & Lungs: Clear to auscultation, no wheezing, rhonchi, or rales  HENT: Normocephalic, Atraumatic.  Eyes: PERRLA, EOMI, Conjunctiva normal.   Neck: Trachia is midline no swelling of the thyroid.   Lymphatic: No lymphadenopathy noted.   Abdomin: Pain to palpation right lower quadrant  Neurologic: Alert & oriented x 3, cranial nerves II through XII are intact, Normal motor function, Normal sensory function, No focal deficits noted.   Psychiatric: Affect normal, Judgment normal, Mood normal.     VITAL SIGNS:/66 (BP Location: Left arm, Patient Position: Sitting, BP Cuff Size: Adult)   Pulse 69   Temp 36.1 °C (97 °F) (Temporal)   Ht 1.829 m (6')   Wt 71 kg (156 lb 9.6 oz)   SpO2 97%   BMI 21.24 kg/m²     Labs: Reviewed    Assessment:                                                     Plan:    1. Abnormal laboratory test  Abnormal lab test check beta-2 glycoprotein patient's  history is possibly related to underlying problems with lupus versus Crohn's disease versus unknown  - BETA-2 GLYCOPROTEIN I AB,G,A,M    2. Right lower quadrant pain  Right lower quadrant pain, previous appendectomy, cholectomy.  Etiology uncertain suspect underlying kidney stone versus ovarian cyst?    Case was reviewed and discussed to [facility]

## 2021-12-08 NOTE — PROGRESS NOTES
Pt crying and upset that her pain is not controlled. Asking to see charge nurse or MD or she is going to leave AMA. Charge nurse Thu notified Dr. Warner. Order placed for IM pain medication.

## 2021-12-09 NOTE — PROGRESS NOTES
"Chart check done.    2038: Pt c/o 8/10 pain in abd. Pt received 0.5mg dilaudid. Pt stated to primary RN, \"the doctor during the day said I could have 1mg from now on.\" Pt states, \"I don't want to have to call you every hour and a half for pain.\" Pt states 0.5mg has not been enough for pain. Pt asked if would like oxy 10mg PO in one hour following dilaudid. Pt refused due to, \"esophageal issues\" and that last admin of oxy 10mg \"dissolved in my throat and I spat up pieces of it.\" Pt accepted spaghetti lean cuisine and ate 50% without c/o nausea.    2148: Pt came out into hallway c/o 10/10 abd pain. Pt states she wants an additional 0.5mg dilaudid or that she is leaving AMA. MD notified. While waiting for MD response, pt visibly anxious and angry, began to yell at primary RN until pt's face turned red, stating: \"I've had 23 surgeries\", \"I've been on these meds since I was 11 years old\" ,\"I've given myself 4mg of dilaudid in the chest\", \"I asked for 1mg not 8mg\", \"I've been under pain management for 30 years\", \"I take multiple oxy 10mg a day\", \"How can I heal when I'm in pain?\" , \"You wonder why people do drugs on the street.\" Pt repeatedly punched fist into mattress and pointed at primary RN, glaring.  Pt requested MD come to bedside to talk about pain meds with her. MD notified primary RN that he was currently admitting pts in ED and would try to get there after. MD and Primary RN agreed to pass case on to oncoming MD as MDs were near switching off. One-time dose of 1mg dilaudid ordered by MD and administered, in meantime.    2230: MD notified of pt's pain med request as MD rounded.     2300: Pt informed, by Primary RN together with Charge RN, of MD's decision to not increase pain meds and MD's rational. Pt states she wants to leave AMA but wants to talk to MD first. Pt informed that MD is busy in ED right now and was offered to talk to NAM. Pt accepted. Pt states, \"You've just started [employer]\" and \"I will take " "this to the , as I have been known to do.\"    2315: NAM talked with pt. Pt stated: \"This is how you treat patients that just came out of [place] and [facility]?\" , \"Want me to go home and smoke cannabis? I'll smoke like a chimney tonight just to survive. Smoke a bajillion.  I'll probably have an asthma attack too\" , \"I'm literally going to die if I stay here\", \"Either he or she can do it or I leave\" , \"Help me or sign my discharge papers.\" NAM states pt stood up and lunged at him at one point during conversation. NAM discussed alternative oxy 10mg admin by crushing med and giving it in liquid. Pt refused, stating, \"I threw up all the ones you gave me. My esophagus is irrigated. I can't have any more oxy. I can already feel sores starting\" , \"I have a swallowing condition. I cannot use my esophagus. I can only take my liquid protein powder.\"     2330: Pt left MD [place] notified of pt's decision. IV removed, fluids stopped. Pt discharged home with ride home. Pt escorted out in wheelchair by CNA and security. Personal belongings accounted for and with pt. Pt refused to sign AMA paper after pt wrote note to staff on AMA sheet and wanted all staff to sign it as well as provide her with copy which staff refused to sign. Sheet uploaded into clinical pictures.   Pt states as primary RN was removing IV, \"I've been recording everything and taking pictures of everything.\" NAM and security notified.  "

## 2021-12-09 NOTE — DISCHARGE SUMMARY
Discharge Summary    CHIEF COMPLAINT ON ADMISSION  Chief Complaint   Patient presents with   • Sent by MD   • RLQ Pain   • Back Pain       Reason for Admission  kidney pain     Admission Date  12/7/2021    CODE STATUS  Prior    HPI & HOSPITAL COURSE    66-year-old female with past medical history of hypertension and Crohn's disease status post colon and rectal resection presenting with abdominal pain.  She was found to have FLORES secondary to right nephrolithiasis with moderate hydronephrosis.  She was started on IV fluids and pain management.  Urology was consulted and patient went for cystoscopy on 12/8 which revealed high-grade obstruction of the right ureter with pus draining under pressure with guidewire and stent placement.  Patient remained very agitated and irritable throughout hospitalization continuously demanding more IV narcotics.  Patient's pain management regimen had been adjusted however she was demanding to get 1 mg of IV Dilaudid every hour.  Due to not getting what she wanted patient left AMA at 11:30 PM.      Therefore, she is discharged in guarded and stable condition against medcial advice.      Discharge Date  12/8/2021    FOLLOW UP ITEMS POST DISCHARGE  Will need urology follow up    DISCHARGE DIAGNOSES  Principal Problem:    FLORES (acute kidney injury) (HCC) POA: Yes  Active Problems:    Essential hypertension POA: Yes    Acute cystitis without hematuria POA: Yes    Ureteric stone POA: Yes  Resolved Problems:    * No resolved hospital problems. *      FOLLOW UP  Future Appointments   Date Time Provider Department Center   1/6/2022  3:20 PM Harvey Monahan M.D. [facility] None   2/1/2022  2:30 PM HANNA PIÑA [facility]     [facility] [place]  [address] [address]  [phone]            MEDICATIONS ON DISCHARGE     Medication List      ASK your doctor about these medications      Instructions   EPINEPHrine 0.3 MG/0.3ML Soaj solution for injection  Commonly  known as: EPIPEN   Inject 0.3 mg into the shoulder, thigh, or buttocks one time. Indications: Life-Threatening Hypersensitivity Reaction  Dose: 0.3 mg     metoprolol tartrate 50 MG Tabs  Commonly known as: LOPRESSOR   TAKE 1 TABLET BY MOUTH TWICE DAILY, HOLD IF BLOOD PRESSURE LESS THAN 95/50     Naloxone 4 MG/0.1ML Liqd  Commonly known as: NARCAN   Administer 4 mg into affected nostril(S) as needed. Indications: Opioid Overdose  Dose: 4 mg     ondansetron 4 MG Tbdp  Commonly known as: ZOFRAN ODT   Take 1-2 Tablets by mouth every 8 hours as needed for Nausea.  Dose: 4-8 mg     oxyCODONE immediate release 10 MG immediate release tablet  Commonly known as: ROXICODONE   Take 10 mg by mouth 4 times a day. Indications: Chronic Pain  Dose: 10 mg     potassium chloride ER 10 MEQ tablet  Commonly known as: KLOR-CON   Take 10 mEq by mouth 1 time a day as needed (with Torsemide).  Dose: 10 mEq     PROBIOTIC PO   Take 1 Capsule by mouth every day.  Dose: 1 Capsule     prochlorperazine 10 MG Tabs  Commonly known as: COMPAZINE   Take 1 Tablet by mouth 1 time a day as needed.  Dose: 10 mg     spironolactone 25 MG Tabs  Commonly known as: ALDACTONE   Take 2 Tablets by mouth every day.  Dose: 50 mg     tizanidine 4 MG Tabs  Commonly known as: ZANAFLEX   Take 2 mg by mouth 2 times a day.  Dose: 2 mg     torsemide 10 MG tablet  Commonly known as: DEMADEX   Take 10-20 mg by mouth 1 time a day as needed (by weight).  Dose: 10-20 mg            Allergies  Allergies   Allergen Reactions   • Amitriptyline Unspecified     Nightmares     • Azathioprine Sodium Hives and Shortness of Breath   • Infliximab Hives, Shortness of Breath and Rash     .   • Methotrexate Hives, Shortness of Breath and Rash     .   • Methotrexate Hives, Rash and Shortness of Breath     .   • Morphine Shortness of Breath, Swelling and Palpitations   • Promethazine Shortness of Breath and Rash     .   • Roxanol [Morphine Sulfate] Swelling     Throat swells   • Carafate  "[Sucralfate] Vomiting and Nausea     Generalizes/Mouth Sores   • Demerol Vomiting   • Duragesic [Fentanyl]      \"Patches didn't work\"  Skin broke down   • Fentanyl Unspecified     Patches caused skin breakdown, no pain relief   • Lorazepam Unspecified     States give me nightmares.    • Meperidine Vomiting   • Methadone Unspecified     Didn't work   • Methadone Unspecified     Didn't work   • Other Drug Unspecified     steroids   • Pregabalin Rash     Rash     • Pseudoephedrine Vomiting   • Sulfa Drugs Hives and Rash     .  .   • Betamethasone Unspecified     Pt unable to remember   • Celestone [Betamethasone Sodium Phosphate] Unspecified     Pt unable to remember   • Sulfasalazine Rash     On chest       DIET  No orders of the defined types were placed in this encounter.      ACTIVITY  As tolerated.  Weight bearing as tolerated    CONSULTATIONS  urology    PROCEDURES  cystoscopy with stent placement    LABORATORY  Lab Results   Component Value Date    SODIUM 140 12/08/2021    POTASSIUM 3.6 12/08/2021    CHLORIDE 106 12/08/2021    CO2 20 12/08/2021    GLUCOSE 98 12/08/2021    BUN 22 12/08/2021    CREATININE 1.44 (H) 12/08/2021    CREATININE 0.89 02/10/2010    GLOMRATE >59 02/10/2010        Lab Results   Component Value Date    WBC 7.6 12/08/2021    WBC 7.9 02/10/2010    HEMOGLOBIN 11.7 (L) 12/08/2021    HEMATOCRIT 35.8 (L) 12/08/2021    PLATELETCT 169 12/08/2021        Total time of the discharge process exceeds 36 minutes.  "

## 2021-12-09 NOTE — PROGRESS NOTES
Called to radiology dept and clarify if portable Fluoroscopy is done, as per the one whom I spoke, it's done Intraoperatively.

## 2021-12-09 NOTE — CARE PLAN
The patient is Stable - Low risk of patient condition declining or worsening    Shift Goals  Clinical Goals: pain mngt    Progress made toward(s) clinical / shift goals: Patient still complaining of pain, received pain medication as ordered.fall precaution observed, call bell within reach and bed in lowest position

## 2021-12-09 NOTE — ANESTHESIA POSTPROCEDURE EVALUATION
Patient: Jana Overton    Procedure Summary     Date: 12/08/21 Room / Location:  OR  / SURGERY [facility]    Anesthesia Start: 1430 Anesthesia Stop: 1504    Procedures:       CYSTOSCOPY, WITH URETERAL STENT INSERTION OR REMOVAL (Right Ureter)      URETEROSCOPY (Right Ureter) Diagnosis: (Right obstructing pyonephrosis)    Surgeons: Luc Hook M.D. Responsible Provider: Ovi Frazier D.O.    Anesthesia Type: general ASA Status: 3          Final Anesthesia Type: general  Last vitals  BP   Blood Pressure : 123/84    Temp   36.7 °C (98.1 °F)    Pulse   74   Resp   16    SpO2   94 %      Anesthesia Post Evaluation    Patient location during evaluation: PACU  Patient participation: complete - patient participated  Level of consciousness: awake and alert  Pain score: 4    Airway patency: patent  Anesthetic complications: no  Cardiovascular status: hemodynamically stable  Respiratory status: acceptable  Hydration status: euvolemic    PONV: none          No complications documented.     Nurse Pain Score: 5 (NPRS)

## 2021-12-09 NOTE — OP REPORT
DATE OF SERVICE:  12/08/2021     PREOPERATIVE DIAGNOSES:  1.  Right flank pain.  2.  Right distal ureterolithiasis.  3.  Urinary tract infection.     OPERATIONS AND PROCEDURES PERFORMED:  1.  Rigid cystourethroscopy.  2.  Right 6-Libyan x 28 cm double-J stent placement.     SURGEON:  Luc Hook MD     ANESTHESIA:  General laryngeal mask.     ANESTHESIOLOGIST:  Ovi Frazier DO     POSTOPERATIVE DIAGNOSES:  1.  Right flank pain.  2.  Right distal ureterolithiasis.  3.  Obstructing right pyonephrosis.     DRAINS:  A 6-Libyan x 28 cm double-J stent.     INTRAOPERATIVE FINDINGS:  At the time of placement of the guidewire in the   right ureter, high pressure pus was seen draining out of the right ureteral   orifice.  The bladder itself was normal.     INDICATIONS:  The patient is a pleasant woman with 1-month history of ill   feeling.  She has had some right upper quadrant back pain, but recently   developed severe, 10/10 right ureteral colic.  She was evaluated with a CAT   scan, which showed evidence of a 4-5 mm distal stone with obstructing signs   with hydroureter.  The patient has had night sweats to the evening prior to   surgery and her urine is equivocal.  Prior to surgery, I discussed with the   patient the options of a nephrostomy tube versus cystoscopy, right   ureteroscopy, laser lithotripsy with possible stent.  I explained to the   patient I would make no attempts at treating the stone if she had an   obstructing pyelonephrosis and the rationale was discussed with the risk of   severe urosepsis.  We discussed the risk of all the procedures including but   not limited to risk of perioperative urinary tract infection, exacerbation of   this with urosepsis, risk of right ureteral perforation with ureteroscopy and   the risk of stent migration and associated flank pain with the stent.  I have   also explained that a secondary procedure may be needed if there is evidence   of obstruction with infection.   I have also discussed the perioperative risk   of deep vein thrombosis, pulmonary embolism, aspiration pneumonia, heart   attack, stroke and death.  Informed consent was given to me by the patient to   proceed and she is marked in the preoperative holding area on her right thigh   with my initials DH and the letter Y for safe site surgery.     DESCRIPTION OF PROCEDURE:  After informed consent was obtained, the patient   was brought to the operating room and placed supine.  Bilateral sequential   compression device in place and operational and a general laryngeal mask   anesthetic was administered in a balanced fashion.  The patient was documented   to have received Rocephin as such, she was positioned in modified lithotomy   and the operative area was Betadine prepped and draped in the usual sterile   fashion.  A surgical timeout was called and all members of the operative team   agree as the patient's name, procedure to be performed without objections,   attention was directed to the procedure.     I began the procedure by passing a generously lubricated 25-Bahamian rigid   cystoscope per urethra.  The urethra was normal.  The bladder shows evidence   of a normal mucosa.  Left ureteral orifice was orthotopic and has efflux.    Right ureteral orifice was orthotopic, but no efflux is seen.  Subsequently, I   cannulated the right ureteral orifice with a 0.38 Sensor wire and when I   advanced the guidewire approximately 5 cm in high pressure pus was seen   draining from the right kidney, had an odor and subsequently no attempted   stone treatment was made.  I advanced the wire into the kidney and then pushed   up a 6-Bahamian x 28 cm double-J stent.  When I withdrew the wire, there was a   good coil in the kidney and a good coil in the bladder and the eyelets were   seen draining high pressure pus at this time, the patient clearly had an   obstructing pyelonephrosis and as such, the bladder was drained.  She   tolerated  the procedure well without complication, was awakened in the   operating room and transferred to recovery room where she arrived in stable   condition.        ______________________________  MD MARIA C Leonard/CHERYL    DD:  12/08/2021 17:59  DT:  12/08/2021 18:34    Job#:  [id number]

## 2021-12-10 ENCOUNTER — PATIENT OUTREACH (OUTPATIENT)
Dept: MEDICAL GROUP | Facility: LAB | Age: 66
End: 2021-12-10

## 2021-12-17 ENCOUNTER — TELEPHONE (OUTPATIENT)
Dept: CARDIOLOGY | Facility: MEDICAL CENTER | Age: 66
End: 2021-12-17

## 2021-12-17 NOTE — TELEPHONE ENCOUNTER
Received surgical clearance from [facility] for patient to have a Cystoscopy with right ureteroscopy, with lithotripsy, with right ureteral stent removal scheduled on December 23, 2021 with Jonathan Turcios M.D.  P:[phone]  F: [phone]    Asking to hold anticoagulants for 7 days prior to procedure.     Routed to  for Recommendations. Sent Voalte message as well.

## 2021-12-21 ENCOUNTER — PRE-ADMISSION TESTING (OUTPATIENT)
Dept: ADMISSIONS | Facility: MEDICAL CENTER | Age: 66
End: 2021-12-21
Attending: UROLOGY
Payer: MEDICARE

## 2021-12-21 DIAGNOSIS — Z01.810 PRE-OPERATIVE CARDIOVASCULAR EXAMINATION: ICD-10-CM

## 2021-12-21 DIAGNOSIS — Z01.812 PRE-OPERATIVE LABORATORY EXAMINATION: ICD-10-CM

## 2021-12-21 NOTE — OR NURSING
Spoke with Charge RN, dany Hinojosa: patient's history with difficult IV starts.  Informed her that the patient will need an ultrasound guided IV start and will ask to speak with the Charge RN upon check-in.  This information is also being included on the [place] Chart Checklist for team communication.  Verbalized agreement and understanding.

## 2021-12-22 ENCOUNTER — ANESTHESIA EVENT (OUTPATIENT)
Dept: SURGERY | Facility: MEDICAL CENTER | Age: 66
End: 2021-12-22
Payer: MEDICARE

## 2021-12-23 ENCOUNTER — HOSPITAL ENCOUNTER (OUTPATIENT)
Facility: MEDICAL CENTER | Age: 66
End: 2021-12-23
Attending: UROLOGY | Admitting: UROLOGY
Payer: MEDICARE

## 2021-12-23 ENCOUNTER — ANESTHESIA (OUTPATIENT)
Dept: SURGERY | Facility: MEDICAL CENTER | Age: 66
End: 2021-12-23
Payer: MEDICARE

## 2021-12-23 ENCOUNTER — APPOINTMENT (OUTPATIENT)
Dept: RADIOLOGY | Facility: MEDICAL CENTER | Age: 66
End: 2021-12-23
Attending: UROLOGY
Payer: MEDICARE

## 2021-12-23 VITALS
OXYGEN SATURATION: 95 % | TEMPERATURE: 97.6 F | DIASTOLIC BLOOD PRESSURE: 60 MMHG | HEIGHT: 72 IN | SYSTOLIC BLOOD PRESSURE: 120 MMHG | RESPIRATION RATE: 16 BRPM | HEART RATE: 74 BPM | WEIGHT: 161.38 LBS | BODY MASS INDEX: 21.86 KG/M2

## 2021-12-23 LAB
EKG IMPRESSION: NORMAL
EXTERNAL QUALITY CONTROL: NORMAL
PATHOLOGY CONSULT NOTE: NORMAL
SARS-COV+SARS-COV-2 AG RESP QL IA.RAPID: NEGATIVE

## 2021-12-23 PROCEDURE — C1769 GUIDE WIRE: HCPCS | Performed by: UROLOGY

## 2021-12-23 PROCEDURE — 700101 HCHG RX REV CODE 250: Performed by: STUDENT IN AN ORGANIZED HEALTH CARE EDUCATION/TRAINING PROGRAM

## 2021-12-23 PROCEDURE — 160028 HCHG SURGERY MINUTES - 1ST 30 MINS LEVEL 3: Performed by: UROLOGY

## 2021-12-23 PROCEDURE — 93010 ELECTROCARDIOGRAM REPORT: CPT | Performed by: INTERNAL MEDICINE

## 2021-12-23 PROCEDURE — 160035 HCHG PACU - 1ST 60 MINS PHASE I: Performed by: UROLOGY

## 2021-12-23 PROCEDURE — 700111 HCHG RX REV CODE 636 W/ 250 OVERRIDE (IP): Performed by: STUDENT IN AN ORGANIZED HEALTH CARE EDUCATION/TRAINING PROGRAM

## 2021-12-23 PROCEDURE — 160039 HCHG SURGERY MINUTES - EA ADDL 1 MIN LEVEL 3: Performed by: UROLOGY

## 2021-12-23 PROCEDURE — 160036 HCHG PACU - EA ADDL 30 MINS PHASE I: Performed by: UROLOGY

## 2021-12-23 PROCEDURE — 93005 ELECTROCARDIOGRAM TRACING: CPT | Performed by: UROLOGY

## 2021-12-23 PROCEDURE — 160002 HCHG RECOVERY MINUTES (STAT): Performed by: UROLOGY

## 2021-12-23 PROCEDURE — A9270 NON-COVERED ITEM OR SERVICE: HCPCS | Performed by: STUDENT IN AN ORGANIZED HEALTH CARE EDUCATION/TRAINING PROGRAM

## 2021-12-23 PROCEDURE — 160025 RECOVERY II MINUTES (STATS): Performed by: UROLOGY

## 2021-12-23 PROCEDURE — 88300 SURGICAL PATH GROSS: CPT

## 2021-12-23 PROCEDURE — C2617 STENT, NON-COR, TEM W/O DEL: HCPCS | Performed by: UROLOGY

## 2021-12-23 PROCEDURE — 500062 HCHG BASKET: Performed by: UROLOGY

## 2021-12-23 PROCEDURE — 700105 HCHG RX REV CODE 258: Performed by: UROLOGY

## 2021-12-23 PROCEDURE — 160046 HCHG PACU - 1ST 60 MINS PHASE II: Performed by: UROLOGY

## 2021-12-23 PROCEDURE — C1758 CATHETER, URETERAL: HCPCS | Performed by: UROLOGY

## 2021-12-23 PROCEDURE — 82365 CALCULUS SPECTROSCOPY: CPT

## 2021-12-23 PROCEDURE — 87426 SARSCOV CORONAVIRUS AG IA: CPT | Performed by: UROLOGY

## 2021-12-23 PROCEDURE — 160009 HCHG ANES TIME/MIN: Performed by: UROLOGY

## 2021-12-23 PROCEDURE — 160048 HCHG OR STATISTICAL LEVEL 1-5: Performed by: UROLOGY

## 2021-12-23 PROCEDURE — 700102 HCHG RX REV CODE 250 W/ 637 OVERRIDE(OP): Performed by: STUDENT IN AN ORGANIZED HEALTH CARE EDUCATION/TRAINING PROGRAM

## 2021-12-23 DEVICE — STENT UROLOGICAL POLARIS 6X26  ULTRA: Type: IMPLANTABLE DEVICE | Site: URETER | Status: FUNCTIONAL

## 2021-12-23 RX ORDER — HYDROMORPHONE HYDROCHLORIDE 1 MG/ML
0.2 INJECTION, SOLUTION INTRAMUSCULAR; INTRAVENOUS; SUBCUTANEOUS
Status: DISCONTINUED | OUTPATIENT
Start: 2021-12-23 | End: 2021-12-23 | Stop reason: HOSPADM

## 2021-12-23 RX ORDER — SODIUM CHLORIDE, SODIUM LACTATE, POTASSIUM CHLORIDE, CALCIUM CHLORIDE 600; 310; 30; 20 MG/100ML; MG/100ML; MG/100ML; MG/100ML
INJECTION, SOLUTION INTRAVENOUS CONTINUOUS
Status: DISCONTINUED | OUTPATIENT
Start: 2021-12-23 | End: 2021-12-23 | Stop reason: HOSPADM

## 2021-12-23 RX ORDER — LABETALOL HYDROCHLORIDE 5 MG/ML
5 INJECTION, SOLUTION INTRAVENOUS
Status: DISCONTINUED | OUTPATIENT
Start: 2021-12-23 | End: 2021-12-23 | Stop reason: HOSPADM

## 2021-12-23 RX ORDER — HYDRALAZINE HYDROCHLORIDE 20 MG/ML
5 INJECTION INTRAMUSCULAR; INTRAVENOUS
Status: DISCONTINUED | OUTPATIENT
Start: 2021-12-23 | End: 2021-12-23 | Stop reason: HOSPADM

## 2021-12-23 RX ORDER — DEXAMETHASONE SODIUM PHOSPHATE 4 MG/ML
INJECTION, SOLUTION INTRA-ARTICULAR; INTRALESIONAL; INTRAMUSCULAR; INTRAVENOUS; SOFT TISSUE PRN
Status: DISCONTINUED | OUTPATIENT
Start: 2021-12-23 | End: 2021-12-23 | Stop reason: SURG

## 2021-12-23 RX ORDER — ONDANSETRON 2 MG/ML
INJECTION INTRAMUSCULAR; INTRAVENOUS PRN
Status: DISCONTINUED | OUTPATIENT
Start: 2021-12-23 | End: 2021-12-23 | Stop reason: SURG

## 2021-12-23 RX ORDER — LIDOCAINE HYDROCHLORIDE 20 MG/ML
INJECTION, SOLUTION EPIDURAL; INFILTRATION; INTRACAUDAL; PERINEURAL PRN
Status: DISCONTINUED | OUTPATIENT
Start: 2021-12-23 | End: 2021-12-23 | Stop reason: SURG

## 2021-12-23 RX ORDER — OXYCODONE HCL 5 MG/5 ML
5 SOLUTION, ORAL ORAL
Status: COMPLETED | OUTPATIENT
Start: 2021-12-23 | End: 2021-12-23

## 2021-12-23 RX ORDER — ONDANSETRON 2 MG/ML
4 INJECTION INTRAMUSCULAR; INTRAVENOUS
Status: DISCONTINUED | OUTPATIENT
Start: 2021-12-23 | End: 2021-12-23 | Stop reason: HOSPADM

## 2021-12-23 RX ORDER — HYDROMORPHONE HYDROCHLORIDE 1 MG/ML
0.4 INJECTION, SOLUTION INTRAMUSCULAR; INTRAVENOUS; SUBCUTANEOUS
Status: DISCONTINUED | OUTPATIENT
Start: 2021-12-23 | End: 2021-12-23 | Stop reason: HOSPADM

## 2021-12-23 RX ORDER — OXYCODONE HCL 10 MG/1
10 TABLET, FILM COATED, EXTENDED RELEASE ORAL ONCE
Status: COMPLETED | OUTPATIENT
Start: 2021-12-23 | End: 2021-12-23

## 2021-12-23 RX ORDER — HALOPERIDOL 5 MG/ML
1 INJECTION INTRAMUSCULAR
Status: DISCONTINUED | OUTPATIENT
Start: 2021-12-23 | End: 2021-12-23 | Stop reason: HOSPADM

## 2021-12-23 RX ORDER — HYDROMORPHONE HYDROCHLORIDE 1 MG/ML
0.1 INJECTION, SOLUTION INTRAMUSCULAR; INTRAVENOUS; SUBCUTANEOUS
Status: DISCONTINUED | OUTPATIENT
Start: 2021-12-23 | End: 2021-12-23 | Stop reason: HOSPADM

## 2021-12-23 RX ORDER — CIPROFLOXACIN 500 MG/1
500 TABLET, FILM COATED ORAL 2 TIMES DAILY
Status: ON HOLD | COMMUNITY
Start: 2021-12-09 | End: 2022-05-22

## 2021-12-23 RX ORDER — ACETAMINOPHEN 500 MG
1000 TABLET ORAL ONCE
Status: COMPLETED | OUTPATIENT
Start: 2021-12-23 | End: 2021-12-23

## 2021-12-23 RX ORDER — DIPHENHYDRAMINE HYDROCHLORIDE 50 MG/ML
12.5 INJECTION INTRAMUSCULAR; INTRAVENOUS
Status: DISCONTINUED | OUTPATIENT
Start: 2021-12-23 | End: 2021-12-23 | Stop reason: HOSPADM

## 2021-12-23 RX ORDER — HYDROMORPHONE HYDROCHLORIDE 2 MG/ML
INJECTION, SOLUTION INTRAMUSCULAR; INTRAVENOUS; SUBCUTANEOUS PRN
Status: DISCONTINUED | OUTPATIENT
Start: 2021-12-23 | End: 2021-12-23 | Stop reason: SURG

## 2021-12-23 RX ORDER — OXYCODONE HCL 5 MG/5 ML
10 SOLUTION, ORAL ORAL
Status: COMPLETED | OUTPATIENT
Start: 2021-12-23 | End: 2021-12-23

## 2021-12-23 RX ORDER — CEFAZOLIN SODIUM 1 G/3ML
INJECTION, POWDER, FOR SOLUTION INTRAMUSCULAR; INTRAVENOUS PRN
Status: DISCONTINUED | OUTPATIENT
Start: 2021-12-23 | End: 2021-12-23 | Stop reason: SURG

## 2021-12-23 RX ADMIN — PROPOFOL 150 MG: 10 INJECTION, EMULSION INTRAVENOUS at 14:27

## 2021-12-23 RX ADMIN — ACETAMINOPHEN 1000 MG: 500 TABLET ORAL at 14:14

## 2021-12-23 RX ADMIN — HYDROMORPHONE HYDROCHLORIDE 0.5 MG: 2 INJECTION, SOLUTION INTRAMUSCULAR; INTRAVENOUS; SUBCUTANEOUS at 14:23

## 2021-12-23 RX ADMIN — HYDROMORPHONE HYDROCHLORIDE 0.5 MG: 2 INJECTION, SOLUTION INTRAMUSCULAR; INTRAVENOUS; SUBCUTANEOUS at 14:41

## 2021-12-23 RX ADMIN — DEXAMETHASONE SODIUM PHOSPHATE 8 MG: 4 INJECTION, SOLUTION INTRA-ARTICULAR; INTRALESIONAL; INTRAMUSCULAR; INTRAVENOUS; SOFT TISSUE at 14:33

## 2021-12-23 RX ADMIN — SODIUM CHLORIDE, POTASSIUM CHLORIDE, SODIUM LACTATE AND CALCIUM CHLORIDE: 600; 310; 30; 20 INJECTION, SOLUTION INTRAVENOUS at 14:15

## 2021-12-23 RX ADMIN — OXYCODONE HYDROCHLORIDE 10 MG: 5 SOLUTION ORAL at 15:09

## 2021-12-23 RX ADMIN — OXYCODONE HYDROCHLORIDE 10 MG: 10 TABLET, FILM COATED, EXTENDED RELEASE ORAL at 14:15

## 2021-12-23 RX ADMIN — CEFAZOLIN 2 G: 330 INJECTION, POWDER, FOR SOLUTION INTRAMUSCULAR; INTRAVENOUS at 14:33

## 2021-12-23 RX ADMIN — LIDOCAINE HYDROCHLORIDE 70 MG: 20 INJECTION, SOLUTION EPIDURAL; INFILTRATION; INTRACAUDAL at 14:27

## 2021-12-23 RX ADMIN — HALOPERIDOL LACTATE 1 MG: 5 INJECTION, SOLUTION INTRAMUSCULAR at 15:10

## 2021-12-23 RX ADMIN — ONDANSETRON 8 MG: 2 INJECTION INTRAMUSCULAR; INTRAVENOUS at 14:51

## 2021-12-23 RX ADMIN — HYDROMORPHONE HYDROCHLORIDE 0.4 MG: 1 INJECTION, SOLUTION INTRAMUSCULAR; INTRAVENOUS; SUBCUTANEOUS at 15:14

## 2021-12-23 ASSESSMENT — PAIN SCALES - GENERAL: PAIN_LEVEL: 5

## 2021-12-23 ASSESSMENT — FIBROSIS 4 INDEX: FIB4 SCORE: 1.61

## 2021-12-23 ASSESSMENT — PAIN DESCRIPTION - PAIN TYPE
TYPE: ACUTE PAIN;CHRONIC PAIN;SURGICAL PAIN
TYPE: ACUTE PAIN;CHRONIC PAIN;SURGICAL PAIN
TYPE: SURGICAL PAIN

## 2021-12-23 NOTE — ANESTHESIA POSTPROCEDURE EVALUATION
Patient: Jana Overton    Procedure Summary     Date: 12/23/21 Room / Location: [place] [address] / SURGERY [facility]    Anesthesia Start: 1423 Anesthesia Stop: 1500    Procedures:       CYSTOSCOPY, WITH URETERAL STENT INSERTION (Right )      URETEROSCOPY      LITHOTRIPSY, USING LASER Diagnosis: (RIGHT KIDNEY STONE)    Surgeons: Jonathan Turcios M.D. Responsible Provider: Fozia Barnett M.D.    Anesthesia Type: general ASA Status: 3          Final Anesthesia Type: general  Last vitals  BP   Blood Pressure : 138/59    Temp   36.2 °C (97.2 °F)    Pulse   83   Resp   18    SpO2   97 %      Anesthesia Post Evaluation    Patient location during evaluation: PACU  Patient participation: complete - patient participated  Level of consciousness: awake and alert  Pain score: 5    Airway patency: patent  Anesthetic complications: no  Cardiovascular status: hemodynamically stable  Respiratory status: acceptable and face mask  Hydration status: euvolemic    PONV: none          No complications documented.     Nurse Pain Score: 7 (NPRS)

## 2021-12-23 NOTE — DISCHARGE INSTRUCTIONS
ACTIVITY: Rest and take it easy for the first 24 hours.  A responsible adult is recommended to remain with you during that time.  It is normal to feel sleepy.  We encourage you to not do anything that requires balance, judgment or coordination.    MILD FLU-LIKE SYMPTOMS ARE NORMAL. YOU MAY EXPERIENCE GENERALIZED MUSCLE ACHES, THROAT IRRITATION, HEADACHE AND/OR SOME NAUSEA.    FOR 24 HOURS DO NOT:  Drive, operate machinery or run household appliances.  Drink beer or alcoholic beverages.   Make important decisions or sign legal documents.    SPECIAL INSTRUCTIONS:     DIET: To avoid nausea, slowly advance diet as tolerated, avoiding spicy or greasy foods for the first day.  Add more substantial food to your diet according to your physician's instructions.  Babies can be fed formula or breast milk as soon as they are hungry.  INCREASE FLUIDS AND FIBER TO AVOID CONSTIPATION.      FOLLOW-UP APPOINTMENT:  A follow-up appointment should be arranged with your doctor in 1-2 weeks; call to schedule.    You should CALL YOUR PHYSICIAN if you develop:  Fever greater than 101 degrees F.  Pain not relieved by medication, or persistent nausea or vomiting.  Excessive bleeding (blood soaking through dressing) or unexpected drainage from the wound.  Extreme redness or swelling around the incision site, drainage of pus or foul smelling drainage.  Inability to urinate or empty your bladder within 8 hours.  Problems with breathing or chest pain.    You should call 911 if you develop problems with breathing or chest pain.  If you are unable to contact your doctor or surgical center, you should go to the nearest emergency room or urgent care center.  Physician's telephone #: Dr. Turcios, [phone]    If any questions arise, call your doctor.  If your doctor is not available, please feel free to call the Surgical Center at [phone].     A registered nurse may call you a few days after your surgery to see how you are doing after  your procedure.    MEDICATIONS: Resume taking daily medication.  Take prescribed pain medication with food.  If no medication is prescribed, you may take non-aspirin pain medication if needed.  PAIN MEDICATION CAN BE VERY CONSTIPATING.  Take a stool softener or laxative such as senokot, pericolace, or milk of magnesia if needed.    Last pain medication given at 3:09pm.    If your physician has prescribed pain medication that includes Acetaminophen (Tylenol), do not take additional Acetaminophen (Tylenol) while taking the prescribed medication.    Depression / Suicide Risk    As you are discharged from this [facility] facility, it is important to learn how to keep safe from harming yourself.    Recognize the warning signs:  · Abrupt changes in personality, positive or negative- including increase in energy   · Giving away possessions  · Change in eating patterns- significant weight changes-  positive or negative  · Change in sleeping patterns- unable to sleep or sleeping all the time   · Unwillingness or inability to communicate  · Depression  · Unusual sadness, discouragement and loneliness  · Talk of wanting to die  · Neglect of personal appearance   · Rebelliousness- reckless behavior  · Withdrawal from people/activities they love  · Confusion- inability to concentrate     If you or a loved one observes any of these behaviors or has concerns about self-harm, here's what you can do:  · Talk about it- your feelings and reasons for harming yourself  · Remove any means that you might use to hurt yourself (examples: pills, rope, extension cords, firearm)  · Get professional help from the community (Mental Health, Substance Abuse, psychological counseling)  · Do not be alone:Call your Safe Contact- someone whom you trust who will be there for you.  · Call your local CRISIS HOTLINE 577-1426 or 395-888-9905  · Call your local Children's Mobile Crisis Response Team [place] (836) 473-6867 or  www.[employer]  · Call the toll free National Suicide Prevention Hotlines   · National Suicide Prevention Lifeline 520-801-VNUQ (9458)  · National Hope Line Network 800-SUICIDE (201-2696)

## 2021-12-23 NOTE — ANESTHESIA TIME REPORT
Anesthesia Start and Stop Event Times     Date Time Event    12/23/2021 1417 Ready for Procedure     1423 Anesthesia Start     1500 Anesthesia Stop        Responsible Staff  12/23/21    Name Role Begin End    Fozia Barnett M.D. Anesth 1423 1500        Preop Diagnosis (Free Text):  Pre-op Diagnosis     RIGHT KIDNEY STONE        Preop Diagnosis (Codes):    Premium Reason  Non-Premium    Comments:

## 2021-12-23 NOTE — ANESTHESIA PREPROCEDURE EVALUATION
Case: [id number] Date/Time: 12/23/21 1415    Procedures:       CYSTOSCOPY, WITH URETERAL STENT INSERTION (Right )      URETEROSCOPY      LITHOTRIPSY, USING LASER    Pre-op diagnosis: KIDNEY STONE    Location: [facility] OR 18 / SURGERY [facility]    Surgeons: Jonathan Turcios M.D.          Relevant Problems   ANESTHESIA   (positive) Sleep apnea      PULMONARY   (positive) COPD (chronic obstructive pulmonary disease) (HCC)   (positive) History of MRSA infection   (positive) History of infection with vancomycin resistant Enterococcus (VRE)      NEURO   (positive) History of DVT (deep vein thrombosis)   (positive) History of MRSA infection   (positive) History of infection with vancomycin resistant Enterococcus (VRE)   (positive) Migraine      CARDIAC   (positive) DVT (deep venous thrombosis) (HCC)   (positive) Essential hypertension   (positive) Migraine   (positive) Paroxysmal atrial fibrillation (HCC)      GI   (positive) GERD (gastroesophageal reflux disease)         (positive) FLORES (acute kidney injury) ([facility])       Physical Exam    Airway   Mallampati: II  TM distance: >3 FB  Neck ROM: full       Cardiovascular - normal exam  Rhythm: regular  Rate: normal  (-) murmur     Dental - normal exam           Pulmonary - normal exam  Breath sounds clear to auscultation     Abdominal    Neurological - normal exam                 Anesthesia Plan    ASA 3   ASA physical status 3 criteria: COPD    Plan - general       Airway plan will be LMA          Induction: intravenous    Postoperative Plan: Postoperative administration of opioids is intended.    Pertinent diagnostic labs and testing reviewed    Informed Consent:    Anesthetic plan and risks discussed with patient.    Use of blood products discussed with: patient whom consented to blood products.

## 2021-12-23 NOTE — ANESTHESIA PROCEDURE NOTES
Airway    Date/Time: 12/23/2021 2:28 PM  Performed by: Fozia Barnett M.D.  Authorized by: Fozia Barnett M.D.     Location:  OR  Urgency:  Elective  Indications for Airway Management:  Anesthesia      Spontaneous Ventilation: absent    Sedation Level:  Deep  Preoxygenated: Yes    Mask Difficulty Assessment:  1 - vent by mask  Final Airway Type:  Supraglottic airway  Final Supraglottic Airway:  Standard LMA    SGA Size:  4  Number of Attempts at Approach:  1

## 2021-12-23 NOTE — PROGRESS NOTES
Pharmacy Medication Reconciliation    ~Med rec updated and complete per patient at bedside  ~Allergies have been verified and updated  ~Pt home pharmacy:[facility]      ~Patient reports that she finished a 14 day course of Cipro that was started on 12/09/2021

## 2021-12-23 NOTE — OR NURSING
"Pt feeling better. Nausea gone. Eating crackers. Drinking apple juice. Pain tolerable. States \" I feel great\".  Pt must void prior DC. Giving LR.  "

## 2021-12-23 NOTE — INTERVAL H&P NOTE
Patient seen and evaluated. No new complaints. Exam unchanged. Will proceed with planned procedure as scheduled.   Here for R ureteroscopy and laser lithotripsy with stent exchange.

## 2021-12-24 NOTE — OR NURSING
Pt's VSS; denies N/V; states pain is at tolerable level. Ambulated to bathroom voided sm amt bloody urine. Pt waiting for her ride.

## 2021-12-28 LAB
APPEARANCE STONE: NORMAL
COMPN STONE: NORMAL
SPECIMEN WT: 11 MG

## 2022-02-01 ENCOUNTER — APPOINTMENT (RX ONLY)
Dept: URBAN - METROPOLITAN AREA CLINIC 20 | Facility: CLINIC | Age: 67
Setting detail: DERMATOLOGY
End: 2022-02-01

## 2022-02-01 ENCOUNTER — HOSPITAL ENCOUNTER (OUTPATIENT)
Dept: RADIOLOGY | Facility: MEDICAL CENTER | Age: 67
End: 2022-02-01
Attending: INTERNAL MEDICINE
Payer: MEDICARE

## 2022-02-01 DIAGNOSIS — Z12.31 ENCOUNTER FOR MAMMOGRAM TO ESTABLISH BASELINE MAMMOGRAM: ICD-10-CM

## 2022-02-01 DIAGNOSIS — L57.0 ACTINIC KERATOSIS: ICD-10-CM

## 2022-02-01 PROCEDURE — ? PDT: RED

## 2022-02-01 PROCEDURE — 77063 BREAST TOMOSYNTHESIS BI: CPT

## 2022-02-01 PROCEDURE — ? PHOTODYNAMIC THERAPY COUNSELING

## 2022-02-01 ASSESSMENT — LOCATION SIMPLE DESCRIPTION DERM: LOCATION SIMPLE: LEFT HAND

## 2022-02-01 ASSESSMENT — LOCATION ZONE DERM: LOCATION ZONE: HAND

## 2022-02-01 ASSESSMENT — LOCATION DETAILED DESCRIPTION DERM: LOCATION DETAILED: LEFT RADIAL DORSAL HAND

## 2022-02-01 NOTE — PROCEDURE: PDT: RED
Expiration Date (Optional): 09.2022
Incubation Time (Set To 00:00:00 If Not Wanted): 03:00
Lot # (Optional): [id number]
Photosensitizer: Ameluz
Consent: Written consent obtained.  The risks were reviewed with the patient including but not limited to: pigmentary changes, pain, blistering, scabbing, redness, and the remote possibility of scarring.
Post-Care Instructions: I reviewed with the patient in detail post-care instructions. Patient is to avoid sunlight for the next 2 days, and wear sun protection. Patients may expect sunburn like redness, discomfort and scabbing.
Number Of Kerasticks/Tubes Of Metvixia/Tubes Of Ameluz Used: 1
Who Performed The Pdt?: Performed by Nurse, MA or Aesthetician ([id number])
Total Number Of Aks Treated (Optional To Report): 0
Anesthesia Type: 0.5% lidocaine with 1:200,000 epinephrine
Application Method: under occlusion
Illumination Time: 7 min 7 sec
Debridement Text (Will Only Render In Visit Note If You Select Debridement Option Under Who Performed The Pdt Field): Prior to application of the photodynamic medication the hyperkeratotic lesions were curetted to make them more amenable to therapy.
Detail Level: Zone

## 2022-02-11 ENCOUNTER — HOSPITAL ENCOUNTER (OUTPATIENT)
Facility: MEDICAL CENTER | Age: 67
End: 2022-02-11
Attending: UROLOGY
Payer: MEDICARE

## 2022-02-11 PROCEDURE — 87086 URINE CULTURE/COLONY COUNT: CPT

## 2022-02-14 LAB
BACTERIA UR CULT: NORMAL
SIGNIFICANT IND 70042: NORMAL
SITE SITE: NORMAL
SOURCE SOURCE: NORMAL

## 2022-02-15 ENCOUNTER — APPOINTMENT (RX ONLY)
Dept: URBAN - METROPOLITAN AREA CLINIC 20 | Facility: CLINIC | Age: 67
Setting detail: DERMATOLOGY
End: 2022-02-15

## 2022-02-15 DIAGNOSIS — L57.0 ACTINIC KERATOSIS: ICD-10-CM

## 2022-02-15 PROCEDURE — ? PHOTODYNAMIC THERAPY COUNSELING

## 2022-02-15 PROCEDURE — ? PDT: RED

## 2022-02-15 ASSESSMENT — LOCATION DETAILED DESCRIPTION DERM: LOCATION DETAILED: LEFT PROXIMAL DORSAL FOREARM

## 2022-02-15 ASSESSMENT — LOCATION ZONE DERM: LOCATION ZONE: ARM

## 2022-02-15 ASSESSMENT — LOCATION SIMPLE DESCRIPTION DERM: LOCATION SIMPLE: LEFT FOREARM

## 2022-02-15 NOTE — PROCEDURE: PDT: RED
Lot # (Optional): [id number]
Number Of Kerasticks/Tubes Of Metvixia/Tubes Of Ameluz Used: 1
Expiration Date (Optional): 09.2022
Incubation Time (Set To 00:00:00 If Not Wanted): 03:00
Anesthesia Type: 0.5% lidocaine with 1:200,000 epinephrine
Who Performed The Pdt?: Performed by Nurse, MA or Aesthetician ([id number])
Debridement Text (Will Only Render In Visit Note If You Select Debridement Option Under Who Performed The Pdt Field): Prior to application of the photodynamic medication the hyperkeratotic lesions were curetted to make them more amenable to therapy.
Illumination Time: 7 min 7 sec
Photosensitizer: Ameluz
Consent: Written consent obtained.  The risks were reviewed with the patient including but not limited to: pigmentary changes, pain, blistering, scabbing, redness, and the remote possibility of scarring.
Post-Care Instructions: I reviewed with the patient in detail post-care instructions. Patient is to avoid sunlight for the next 2 days, and wear sun protection. Patients may expect sunburn like redness, discomfort and scabbing.
Detail Level: Zone
Application Method: under occlusion
Total Number Of Aks Treated (Optional To Report): 0

## 2022-02-22 ENCOUNTER — APPOINTMENT (RX ONLY)
Dept: URBAN - METROPOLITAN AREA CLINIC 20 | Facility: CLINIC | Age: 67
Setting detail: DERMATOLOGY
End: 2022-02-22

## 2022-02-22 DIAGNOSIS — Z71.89 OTHER SPECIFIED COUNSELING: ICD-10-CM

## 2022-02-22 DIAGNOSIS — D22 MELANOCYTIC NEVI: ICD-10-CM

## 2022-02-22 DIAGNOSIS — L81.4 OTHER MELANIN HYPERPIGMENTATION: ICD-10-CM

## 2022-02-22 DIAGNOSIS — L82.1 OTHER SEBORRHEIC KERATOSIS: ICD-10-CM

## 2022-02-22 DIAGNOSIS — L71.8 OTHER ROSACEA: ICD-10-CM | Status: INADEQUATELY CONTROLLED

## 2022-02-22 DIAGNOSIS — Z85.828 PERSONAL HISTORY OF OTHER MALIGNANT NEOPLASM OF SKIN: ICD-10-CM

## 2022-02-22 DIAGNOSIS — D18.0 HEMANGIOMA: ICD-10-CM

## 2022-02-22 PROBLEM — D22.71 MELANOCYTIC NEVI OF RIGHT LOWER LIMB, INCLUDING HIP: Status: ACTIVE | Noted: 2022-02-22

## 2022-02-22 PROBLEM — D22.62 MELANOCYTIC NEVI OF LEFT UPPER LIMB, INCLUDING SHOULDER: Status: ACTIVE | Noted: 2022-02-22

## 2022-02-22 PROBLEM — D22.39 MELANOCYTIC NEVI OF OTHER PARTS OF FACE: Status: ACTIVE | Noted: 2022-02-22

## 2022-02-22 PROBLEM — D22.61 MELANOCYTIC NEVI OF RIGHT UPPER LIMB, INCLUDING SHOULDER: Status: ACTIVE | Noted: 2022-02-22

## 2022-02-22 PROBLEM — D18.01 HEMANGIOMA OF SKIN AND SUBCUTANEOUS TISSUE: Status: ACTIVE | Noted: 2022-02-22

## 2022-02-22 PROBLEM — D22.72 MELANOCYTIC NEVI OF LEFT LOWER LIMB, INCLUDING HIP: Status: ACTIVE | Noted: 2022-02-22

## 2022-02-22 PROBLEM — D22.5 MELANOCYTIC NEVI OF TRUNK: Status: ACTIVE | Noted: 2022-02-22

## 2022-02-22 PROCEDURE — 99213 OFFICE O/P EST LOW 20 MIN: CPT

## 2022-02-22 PROCEDURE — ? SUNSCREEN RECOMMENDATIONS

## 2022-02-22 PROCEDURE — ? PRESCRIPTION

## 2022-02-22 PROCEDURE — ? COUNSELING

## 2022-02-22 RX ORDER — AZELAIC ACID 0.15 G/G
GEL TOPICAL
Qty: 1 | Refills: 3 | Status: ERX | COMMUNITY
Start: 2022-02-22

## 2022-02-22 RX ADMIN — AZELAIC ACID 1: 0.15 GEL TOPICAL at 00:00

## 2022-02-22 ASSESSMENT — LOCATION SIMPLE DESCRIPTION DERM
LOCATION SIMPLE: UPPER BACK
LOCATION SIMPLE: LEFT FOREARM
LOCATION SIMPLE: RIGHT FOREHEAD
LOCATION SIMPLE: LEFT POSTERIOR UPPER ARM
LOCATION SIMPLE: RIGHT PRETIBIAL REGION
LOCATION SIMPLE: LEFT POSTERIOR THIGH
LOCATION SIMPLE: LEFT PRETIBIAL REGION
LOCATION SIMPLE: RIGHT POSTERIOR THIGH
LOCATION SIMPLE: RIGHT CHEEK
LOCATION SIMPLE: RIGHT FOREARM
LOCATION SIMPLE: RIGHT POSTERIOR UPPER ARM
LOCATION SIMPLE: RIGHT UPPER BACK
LOCATION SIMPLE: LEFT CHEEK
LOCATION SIMPLE: RIGHT LOWER BACK

## 2022-02-22 ASSESSMENT — LOCATION DETAILED DESCRIPTION DERM
LOCATION DETAILED: RIGHT SUPERIOR UPPER BACK
LOCATION DETAILED: RIGHT PROXIMAL PRETIBIAL REGION
LOCATION DETAILED: RIGHT VENTRAL PROXIMAL FOREARM
LOCATION DETAILED: RIGHT INFERIOR MEDIAL UPPER BACK
LOCATION DETAILED: RIGHT INFERIOR FOREHEAD
LOCATION DETAILED: LEFT PROXIMAL POSTERIOR UPPER ARM
LOCATION DETAILED: INFERIOR THORACIC SPINE
LOCATION DETAILED: RIGHT DISTAL POSTERIOR UPPER ARM
LOCATION DETAILED: LEFT VENTRAL PROXIMAL FOREARM
LOCATION DETAILED: RIGHT DISTAL POSTERIOR THIGH
LOCATION DETAILED: LEFT LATERAL PROXIMAL PRETIBIAL REGION
LOCATION DETAILED: LEFT DISTAL POSTERIOR THIGH
LOCATION DETAILED: LEFT INFERIOR CENTRAL MALAR CHEEK
LOCATION DETAILED: RIGHT INFERIOR MEDIAL MIDBACK
LOCATION DETAILED: RIGHT INFERIOR CENTRAL MALAR CHEEK
LOCATION DETAILED: LEFT SUPERIOR MEDIAL MALAR CHEEK

## 2022-02-22 ASSESSMENT — LOCATION ZONE DERM
LOCATION ZONE: TRUNK
LOCATION ZONE: ARM
LOCATION ZONE: LEG
LOCATION ZONE: FACE

## 2022-02-22 NOTE — PROCEDURE: MIPS QUALITY
Quality 111:Pneumonia Vaccination Status For Older Adults: Pneumococcal Vaccination Previously Received
Quality 226: Preventive Care And Screening: Tobacco Use: Screening And Cessation Intervention: Patient screened for tobacco use and is an ex/non-smoker
Detail Level: Detailed
Quality 130: Documentation Of Current Medications In The Medical Record: Current Medications Documented
Quality 431: Preventive Care And Screening: Unhealthy Alcohol Use - Screening: Patient not identified as an unhealthy alcohol user when screened for unhealthy alcohol use using a systematic screening method

## 2022-02-24 NOTE — CARE PLAN
Problem: Safety  Goal: Will remain free from injury  Outcome: PROGRESSING AS EXPECTED  Pt uses call light appropriately, non-skid socks on, ambulates with walker and assist.     Problem: Pain Management  Goal: Pain level will decrease to patient's comfort goal  Outcome: PROGRESSING AS EXPECTED  Pt c/o pain, prn dilaudid given.       No

## 2022-03-18 NOTE — CARE PLAN
Problem: Bowel/Gastric:  Goal: Normal bowel function is maintained or improved  Outcome: PROGRESSING AS EXPECTED  Patient having complaints of nausea, no vomiting.  Given zofran per MAR.      Problem: Pain Management  Goal: Pain level will decrease to patient's comfort goal  Outcome: PROGRESSING AS EXPECTED  Patient remains on PCA for pain management, tolerating well.         calm

## 2022-04-07 ENCOUNTER — OFFICE VISIT (OUTPATIENT)
Dept: MEDICAL GROUP | Facility: LAB | Age: 67
End: 2022-04-07
Payer: MEDICARE

## 2022-04-07 VITALS
TEMPERATURE: 96.6 F | SYSTOLIC BLOOD PRESSURE: 142 MMHG | BODY MASS INDEX: 22.28 KG/M2 | DIASTOLIC BLOOD PRESSURE: 70 MMHG | HEIGHT: 72 IN | WEIGHT: 164.46 LBS | HEART RATE: 92 BPM | OXYGEN SATURATION: 98 %

## 2022-04-07 DIAGNOSIS — J01.01 ACUTE RECURRENT MAXILLARY SINUSITIS: ICD-10-CM

## 2022-04-07 PROCEDURE — 99213 OFFICE O/P EST LOW 20 MIN: CPT | Performed by: INTERNAL MEDICINE

## 2022-04-07 RX ORDER — AMOXICILLIN AND CLAVULANATE POTASSIUM 875; 125 MG/1; MG/1
1 TABLET, FILM COATED ORAL 2 TIMES DAILY
Qty: 14 TABLET | Refills: 0 | Status: ON HOLD
Start: 2022-04-07 | End: 2022-05-22

## 2022-04-07 ASSESSMENT — FIBROSIS 4 INDEX: FIB4 SCORE: 1.61

## 2022-04-07 NOTE — PROGRESS NOTES
CC: Jana Overton is a 66 y.o. female is suffering from   Chief Complaint   Patient presents with   • Sinus Problem         SUBJECTIVE:  1. Acute recurrent maxillary sinusitis  Jana is here for follow-up is having problems with bilateral maxillary sinus pain discomfort, states that her face is swollen and painful        Past social, family, history: , [place]  Social History     Tobacco Use   • Smoking status: Never Smoker   • Smokeless tobacco: Never Used   • Tobacco comment: second hand smoke parents - smoked for only 1 year many years ago   Vaping Use   • Vaping Use: Never used   Substance Use Topics   • Alcohol use: Not Currently     Alcohol/week: 0.6 oz     Types: 1 Shots of liquor per week     Comment: gin and half a lime; tonic water   • Drug use: Not Currently     Types: Marijuana, Inhaled     Comment: medical marijuana through bong         MEDICATIONS:    Current Outpatient Medications:   •  amoxicillin-clavulanate (AUGMENTIN) 875-125 MG Tab, Take 1 Tablet by mouth 2 times a day., Disp: 14 Tablet, Rfl: 0  •  ciprofloxacin (CIPRO) 500 MG Tab, Take 500 mg by mouth 2 times a day. 14 day course, Disp: , Rfl:   •  EPINEPHrine (EPIPEN) 0.3 MG/0.3ML Solution Auto-injector solution for injection, Inject 0.3 mg into the shoulder, thigh, or buttocks one time. Indications: Life-Threatening Hypersensitivity Reaction, Disp: , Rfl:   •  Naloxone (NARCAN) 4 MG/0.1ML Liquid, Administer 4 mg into affected nostril(S) as needed. Indications: Opioid Overdose, Disp: , Rfl:   •  metoprolol tartrate (LOPRESSOR) 50 MG Tab, TAKE 1 TABLET BY MOUTH TWICE DAILY, HOLD IF BLOOD PRESSURE LESS THAN 95/50 (Patient taking differently: Take 50 mg by mouth 2 times a day.), Disp: 180 Tablet, Rfl: 2  •  prochlorperazine (COMPAZINE) 10 MG Tab, Take 1 Tablet by mouth 1 time a day as needed., Disp: 10 Tablet, Rfl: 0  •  ondansetron (ZOFRAN ODT) 4 MG TABLET DISPERSIBLE, Take 1-2 Tablets by mouth every 8 hours as needed for Nausea., Disp: 20  Tablet, Rfl: 3  •  spironolactone (ALDACTONE) 25 MG Tab, Take 2 Tablets by mouth every day., Disp: 180 tablet, Rfl: 3  •  potassium chloride ER (KLOR-CON) 10 MEQ tablet, Take 10 mEq by mouth 1 time a day as needed (with Torsemide)., Disp: , Rfl:   •  oxyCODONE immediate release (ROXICODONE) 10 MG immediate release tablet, Take 10 mg by mouth 4 times a day. Indications: Chronic Pain, Disp: , Rfl:   •  torsemide (DEMADEX) 10 MG tablet, Take 10-20 mg by mouth 1 time a day as needed (by weight)., Disp: , Rfl:   •  Probiotic Product (PROBIOTIC PO), Take 1 Capsule by mouth every day., Disp: , Rfl:     PROBLEMS:  Patient Active Problem List    Diagnosis Date Noted   • Acute cystitis without hematuria 12/07/2021   • Ureteric stone 12/07/2021   • Migraine 06/04/2019   • FLORES (acute kidney injury) ([facility]) 06/01/2019   • Essential hypertension 05/20/2019   • DVT (deep venous thrombosis) ([facility]) 12/31/2018   • History of MRSA infection 12/29/2018   • History of infection with vancomycin resistant Enterococcus (VRE) 12/29/2018   • 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[facility]) 12/28/2018   • Dysphasia 12/28/2018   • Anemia 12/15/2018   • Thrush of mouth and esophagus ([facility]) 12/13/2018   • Liver enzyme elevation 12/12/2018   • Leukocytosis 12/12/2018   • Chronic respiratory failure with hypoxia ([facility]) 03/20/2018   • Anxiety disorder due to multiple medical problems 12/01/2017   • DDD (degenerative disc disease), lumbar 04/12/2017   • History of DVT (deep vein thrombosis) 11/23/2016   • Paroxysmal atrial fibrillation ([facility]) 03/26/2015   • Sleep apnea 10/26/2014   • Postherpetic neuralgia 06/24/2014   • Multiple falls 06/24/2014   • COPD (chronic obstructive pulmonary disease) ([facility]) 06/24/2014   • Rosacea 06/24/2014   • Ileostomy in place ([facility]) 06/26/2013   • GERD (gastroesophageal reflux disease) 12/05/2012   • Status post lumbar surgery 07/16/2012   • Crohn's disease of small intestine with complication ([facility]) 09/23/2009   • Chronic pain 09/23/2009   • Opioid  type dependence, continuous ([facility]) 09/23/2009       REVIEW OF SYSTEMS:  Gen.:  No Nausea, Vomiting, fever, Chills.  Heart: No chest pain.  Lungs:  No shortness of Breath.  Psychological: Rg unusual Anxiety depression     PHYSICAL EXAM   Constitutional: Alert, cooperative, not in acute distress.  Cardiovascular:  Rate Rhythm is regular without murmurs rubs clicks.     Thorax & Lungs: Clear to auscultation, no wheezing, rhonchi, or rales  HENT: Normocephalic, Atraumatic.  Eyes: PERRLA, EOMI, Conjunctiva normal.   Neck: Trachia is midline no swelling of the thyroid.   Lymphatic: No lymphadenopathy noted.   Musculoskeletal: Pain to percussion maxillary sinuses  Neurologic: Alert & oriented x 3, cranial nerves II through XII are intact, Normal motor function, Normal sensory function, No focal deficits noted.   Psychiatric: Affect normal, Judgment normal, Mood normal.     VITAL SIGNS:/70   Pulse 92   Temp 35.9 °C (96.6 °F)   Ht 1.829 m (6')   Wt 74.6 kg (164 lb 7.4 oz)   SpO2 98%   BMI 22.31 kg/m²     Labs: Reviewed    Assessment:                                                     Plan:    1. Acute recurrent maxillary sinusitis  Recurrent maxillary sinusitis worsening over the past 2 months now significant pain start  - amoxicillin-clavulanate (AUGMENTIN) 875-125 MG Tab; Take 1 Tablet by mouth 2 times a day.  Dispense: 14 Tablet; Refill: 0

## 2022-04-19 DIAGNOSIS — K50.019 CROHN'S DISEASE OF SMALL INTESTINE WITH COMPLICATION (HCC): ICD-10-CM

## 2022-04-19 RX ORDER — ONDANSETRON 4 MG/1
TABLET, ORALLY DISINTEGRATING ORAL
Qty: 40 TABLET | Refills: 3 | Status: ON HOLD
Start: 2022-04-19 | End: 2022-05-22

## 2022-04-19 NOTE — TELEPHONE ENCOUNTER
Received request via: Pharmacy    Was the patient seen in the last year in this department? Yes  4/7/2022  Does the patient have an active prescription (recently filled or refills available) for medication(s) requested? No

## 2022-05-05 ENCOUNTER — OFFICE VISIT (OUTPATIENT)
Dept: MEDICAL GROUP | Facility: LAB | Age: 67
End: 2022-05-05
Payer: MEDICARE

## 2022-05-05 ENCOUNTER — HOSPITAL ENCOUNTER (OUTPATIENT)
Dept: LAB | Facility: MEDICAL CENTER | Age: 67
End: 2022-05-05
Attending: INTERNAL MEDICINE
Payer: MEDICARE

## 2022-05-05 VITALS
BODY MASS INDEX: 22.11 KG/M2 | SYSTOLIC BLOOD PRESSURE: 100 MMHG | DIASTOLIC BLOOD PRESSURE: 70 MMHG | HEIGHT: 72 IN | TEMPERATURE: 96.7 F | OXYGEN SATURATION: 92 % | HEART RATE: 74 BPM | WEIGHT: 163.2 LBS

## 2022-05-05 DIAGNOSIS — R29.898 WEAKNESS OF BOTH LOWER EXTREMITIES: ICD-10-CM

## 2022-05-05 DIAGNOSIS — R29.898 SHOULDER WEAKNESS: ICD-10-CM

## 2022-05-05 LAB
ALBUMIN SERPL BCP-MCNC: 4.4 G/DL (ref 3.2–4.9)
ALBUMIN/GLOB SERPL: 1.4 G/DL
ALP SERPL-CCNC: 128 U/L (ref 30–99)
ALT SERPL-CCNC: 52 U/L (ref 2–50)
ANION GAP SERPL CALC-SCNC: 10 MMOL/L (ref 7–16)
AST SERPL-CCNC: 34 U/L (ref 12–45)
BASOPHILS # BLD AUTO: 1 % (ref 0–1.8)
BASOPHILS # BLD: 0.08 K/UL (ref 0–0.12)
BILIRUB SERPL-MCNC: 0.4 MG/DL (ref 0.1–1.5)
BUN SERPL-MCNC: 18 MG/DL (ref 8–22)
CALCIUM SERPL-MCNC: 9.9 MG/DL (ref 8.5–10.5)
CHLORIDE SERPL-SCNC: 101 MMOL/L (ref 96–112)
CK SERPL-CCNC: 41 U/L (ref 0–154)
CO2 SERPL-SCNC: 25 MMOL/L (ref 20–33)
CREAT SERPL-MCNC: 1.04 MG/DL (ref 0.5–1.4)
CRP SERPL HS-MCNC: <0.3 MG/DL (ref 0–0.75)
EOSINOPHIL # BLD AUTO: 0.35 K/UL (ref 0–0.51)
EOSINOPHIL NFR BLD: 4.5 % (ref 0–6.9)
ERYTHROCYTE [DISTWIDTH] IN BLOOD BY AUTOMATED COUNT: 41.1 FL (ref 35.9–50)
ERYTHROCYTE [SEDIMENTATION RATE] IN BLOOD BY WESTERGREN METHOD: 16 MM/HOUR (ref 0–25)
GFR SERPLBLD CREATININE-BSD FMLA CKD-EPI: 58 ML/MIN/1.73 M 2
GLOBULIN SER CALC-MCNC: 3.1 G/DL (ref 1.9–3.5)
GLUCOSE SERPL-MCNC: 101 MG/DL (ref 65–99)
HCT VFR BLD AUTO: 44.5 % (ref 37–47)
HGB BLD-MCNC: 14.8 G/DL (ref 12–16)
IMM GRANULOCYTES # BLD AUTO: 0.02 K/UL (ref 0–0.11)
IMM GRANULOCYTES NFR BLD AUTO: 0.3 % (ref 0–0.9)
LYMPHOCYTES # BLD AUTO: 1.99 K/UL (ref 1–4.8)
LYMPHOCYTES NFR BLD: 25.4 % (ref 22–41)
MCH RBC QN AUTO: 31.2 PG (ref 27–33)
MCHC RBC AUTO-ENTMCNC: 33.3 G/DL (ref 33.6–35)
MCV RBC AUTO: 93.9 FL (ref 81.4–97.8)
MONOCYTES # BLD AUTO: 0.73 K/UL (ref 0–0.85)
MONOCYTES NFR BLD AUTO: 9.3 % (ref 0–13.4)
NEUTROPHILS # BLD AUTO: 4.65 K/UL (ref 2–7.15)
NEUTROPHILS NFR BLD: 59.5 % (ref 44–72)
NRBC # BLD AUTO: 0 K/UL
NRBC BLD-RTO: 0 /100 WBC
PLATELET # BLD AUTO: 258 K/UL (ref 164–446)
PMV BLD AUTO: 10.4 FL (ref 9–12.9)
POTASSIUM SERPL-SCNC: 4 MMOL/L (ref 3.6–5.5)
PROT SERPL-MCNC: 7.5 G/DL (ref 6–8.2)
RBC # BLD AUTO: 4.74 M/UL (ref 4.2–5.4)
RHEUMATOID FACT SER IA-ACNC: 10 IU/ML (ref 0–14)
SODIUM SERPL-SCNC: 136 MMOL/L (ref 135–145)
WBC # BLD AUTO: 7.8 K/UL (ref 4.8–10.8)

## 2022-05-05 PROCEDURE — 86431 RHEUMATOID FACTOR QUANT: CPT

## 2022-05-05 PROCEDURE — 36415 COLL VENOUS BLD VENIPUNCTURE: CPT

## 2022-05-05 PROCEDURE — 82550 ASSAY OF CK (CPK): CPT

## 2022-05-05 PROCEDURE — 86140 C-REACTIVE PROTEIN: CPT

## 2022-05-05 PROCEDURE — 80053 COMPREHEN METABOLIC PANEL: CPT

## 2022-05-05 PROCEDURE — 99213 OFFICE O/P EST LOW 20 MIN: CPT | Performed by: INTERNAL MEDICINE

## 2022-05-05 PROCEDURE — 85652 RBC SED RATE AUTOMATED: CPT

## 2022-05-05 PROCEDURE — 85025 COMPLETE CBC W/AUTO DIFF WBC: CPT

## 2022-05-05 RX ORDER — METHYLPREDNISOLONE 4 MG/1
TABLET ORAL
Qty: 21 TABLET | Refills: 0 | Status: SHIPPED | OUTPATIENT
Start: 2022-05-05 | End: 2022-05-17 | Stop reason: SDUPTHER

## 2022-05-05 ASSESSMENT — FIBROSIS 4 INDEX: FIB4 SCORE: 1.61

## 2022-05-05 ASSESSMENT — PATIENT HEALTH QUESTIONNAIRE - PHQ9: CLINICAL INTERPRETATION OF PHQ2 SCORE: 0

## 2022-05-06 NOTE — PROGRESS NOTES
CC: Jana Overton is a 66 y.o. female is suffering from   Chief Complaint   Patient presents with   • Difficulty Walking   • Dizziness         SUBJECTIVE:  1. Shoulder weakness  Patient is here for follow-up is having problems with weakness, we have discussed the possibility that she is dealing with polymyalgia rheumatica, I recommended blood tests and to start a Medrol Dosepak afterwards.    2. Weakness of both lower extremities  Lower extremity weakness possibly polymyalgia rheumatica        Past social, family, history: , Goyo  Social History     Tobacco Use   • Smoking status: Never Smoker   • Smokeless tobacco: Never Used   • Tobacco comment: second hand smoke parents - smoked for only 1 year many years ago   Vaping Use   • Vaping Use: Never used   Substance Use Topics   • Alcohol use: Not Currently     Alcohol/week: 0.6 oz     Types: 1 Shots of liquor per week     Comment: gin and half a lime; tonic water   • Drug use: Not Currently     Types: Marijuana, Inhaled     Comment: medical marijuana through bong         MEDICATIONS:    Current Outpatient Medications:   •  methylPREDNISolone (MEDROL DOSEPAK) 4 MG Tablet Therapy Pack, As directed on the packaging label., Disp: 21 Tablet, Rfl: 0  •  ondansetron (ZOFRAN ODT) 4 MG TABLET DISPERSIBLE, DISSOLVE ONE TO TWO TABLETS BY MOUTH EVERY 8 HOURS AS NEEDED FOR NAUSEA, Disp: 40 Tablet, Rfl: 3  •  amoxicillin-clavulanate (AUGMENTIN) 875-125 MG Tab, Take 1 Tablet by mouth 2 times a day., Disp: 14 Tablet, Rfl: 0  •  ciprofloxacin (CIPRO) 500 MG Tab, Take 500 mg by mouth 2 times a day. 14 day course, Disp: , Rfl:   •  EPINEPHrine (EPIPEN) 0.3 MG/0.3ML Solution Auto-injector solution for injection, Inject 0.3 mg into the shoulder, thigh, or buttocks one time. Indications: Life-Threatening Hypersensitivity Reaction, Disp: , Rfl:   •  Naloxone (NARCAN) 4 MG/0.1ML Liquid, Administer 4 mg into affected nostril(S) as needed. Indications: Opioid Overdose, Disp: , Rfl:    •  metoprolol tartrate (LOPRESSOR) 50 MG Tab, TAKE 1 TABLET BY MOUTH TWICE DAILY, HOLD IF BLOOD PRESSURE LESS THAN 95/50 (Patient taking differently: Take 50 mg by mouth 2 times a day.), Disp: 180 Tablet, Rfl: 2  •  prochlorperazine (COMPAZINE) 10 MG Tab, Take 1 Tablet by mouth 1 time a day as needed., Disp: 10 Tablet, Rfl: 0  •  spironolactone (ALDACTONE) 25 MG Tab, Take 2 Tablets by mouth every day., Disp: 180 tablet, Rfl: 3  •  potassium chloride ER (KLOR-CON) 10 MEQ tablet, Take 10 mEq by mouth 1 time a day as needed (with Torsemide)., Disp: , Rfl:   •  oxyCODONE immediate release (ROXICODONE) 10 MG immediate release tablet, Take 10 mg by mouth 4 times a day. Indications: Chronic Pain, Disp: , Rfl:   •  torsemide (DEMADEX) 10 MG tablet, Take 10-20 mg by mouth 1 time a day as needed (by weight)., Disp: , Rfl:   •  Probiotic Product (PROBIOTIC PO), Take 1 Capsule by mouth every day., Disp: , Rfl:     PROBLEMS:  Patient Active Problem List    Diagnosis Date Noted   • Acute cystitis without hematuria 12/07/2021   • Ureteric stone 12/07/2021   • Migraine 06/04/2019   • FLORES (acute kidney injury) ([facility]) 06/01/2019   • Essential hypertension 05/20/2019   • DVT (deep venous thrombosis) ([facility]) 12/31/2018   • History of MRSA infection 12/29/2018   • History of infection with vancomycin resistant Enterococcus (VRE) 12/29/2018   • 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[facility]) 12/28/2018   • Dysphasia 12/28/2018   • Anemia 12/15/2018   • Thrush of mouth and esophagus ([facility]) 12/13/2018   • Liver enzyme elevation 12/12/2018   • Leukocytosis 12/12/2018   • Chronic respiratory failure with hypoxia ([facility]) 03/20/2018   • Anxiety disorder due to multiple medical problems 12/01/2017   • DDD (degenerative disc disease), lumbar 04/12/2017   • History of DVT (deep vein thrombosis) 11/23/2016   • Paroxysmal atrial fibrillation (HCC) 03/26/2015   • Sleep apnea 10/26/2014   • Postherpetic neuralgia 06/24/2014   • Multiple falls 06/24/2014   • COPD (chronic  obstructive pulmonary disease) ([facility]) 06/24/2014   • Rosacea 06/24/2014   • Ileostomy in place ([facility]) 06/26/2013   • GERD (gastroesophageal reflux disease) 12/05/2012   • Status post lumbar surgery 07/16/2012   • Crohn's disease of small intestine with complication ([facility]) 09/23/2009   • Chronic pain 09/23/2009   • Opioid type dependence, continuous (CMS-HCC) 09/23/2009       REVIEW OF SYSTEMS:  Gen.:  No Nausea, Vomiting, fever, Chills.  Heart: No chest pain.  Lungs:  No shortness of Breath.  Psychological: Rg unusual Anxiety depression     PHYSICAL EXAM   Constitutional: Alert, cooperative, not in acute distress.  Cardiovascular:  Rate Rhythm is regular without murmurs rubs clicks.     Thorax & Lungs: Clear to auscultation, no wheezing, rhonchi, or rales  HENT: Normocephalic, Atraumatic.  Eyes: PERRLA, EOMI, Conjunctiva normal.   Neck: Trachia is midline no swelling of the thyroid.   Lymphatic: No lymphadenopathy noted.   Musculoskeletal: Weakness associated with left right shoulders and legs  Neurologic: Alert & oriented x 3, cranial nerves II through XII are intact, Normal motor function, Normal sensory function, No focal deficits noted.   Psychiatric: Affect normal, Judgment normal, Mood normal.     VITAL SIGNS:/70 (BP Location: Left arm, Patient Position: Sitting, BP Cuff Size: Adult)   Pulse 74   Temp 35.9 °C (96.7 °F) (Temporal)   Ht 1.829 m (6')   Wt 74 kg (163 lb 3.2 oz)   SpO2 92%   BMI 22.13 kg/m²     Labs: Reviewed    Assessment:                                                     Plan:    1. Shoulder weakness  Labs ordered  - CREATINE KINASE; Future  - CRP QUANTITIVE (NON-CARDIAC); Future  - RHEUMATOID ARTHRITIS FACTOR; Future  - Sed Rate; Future  - CBC WITH DIFFERENTIAL; Future  - methylPREDNISolone (MEDROL DOSEPAK) 4 MG Tablet Therapy Pack; As directed on the packaging label.  Dispense: 21 Tablet; Refill: 0  - Comp Metabolic Panel; Future    2. Weakness of both lower extremities  Labs  ordered start prednisone after labs are completed report back on Monday if improved.  - CREATINE KINASE; Future  - CRP QUANTITIVE (NON-CARDIAC); Future  - RHEUMATOID ARTHRITIS FACTOR; Future  - Sed Rate; Future  - CBC WITH DIFFERENTIAL; Future  - methylPREDNISolone (MEDROL DOSEPAK) 4 MG Tablet Therapy Pack; As directed on the packaging label.  Dispense: 21 Tablet; Refill: 0  - Comp Metabolic Panel; Future

## 2022-05-11 ENCOUNTER — PATIENT MESSAGE (OUTPATIENT)
Dept: MEDICAL GROUP | Facility: LAB | Age: 67
End: 2022-05-11
Payer: MEDICARE

## 2022-05-17 DIAGNOSIS — R29.898 SHOULDER WEAKNESS: ICD-10-CM

## 2022-05-17 DIAGNOSIS — R29.898 WEAKNESS OF BOTH LOWER EXTREMITIES: ICD-10-CM

## 2022-05-17 RX ORDER — SPIRONOLACTONE 25 MG/1
50 TABLET ORAL DAILY
Qty: 180 TABLET | Refills: 3 | Status: SHIPPED
Start: 2022-05-17 | End: 2022-06-16

## 2022-05-17 RX ORDER — METHYLPREDNISOLONE 4 MG/1
TABLET ORAL
Qty: 21 TABLET | Refills: 0 | Status: ON HOLD
Start: 2022-05-17 | End: 2022-05-22

## 2022-05-17 NOTE — TELEPHONE ENCOUNTER
Received request via: Patient    Was the patient seen in the last year in this department? Yes  LOV 05/05/2022  Does the patient have an active prescription (recently filled or refills available) for medication(s) requested? No

## 2022-05-18 ENCOUNTER — PATIENT MESSAGE (OUTPATIENT)
Dept: CARDIOLOGY | Facility: MEDICAL CENTER | Age: 67
End: 2022-05-18
Payer: MEDICARE

## 2022-05-18 ENCOUNTER — TELEPHONE (OUTPATIENT)
Dept: MEDICAL GROUP | Facility: LAB | Age: 67
End: 2022-05-18
Payer: MEDICARE

## 2022-05-18 DIAGNOSIS — G47.00 INSOMNIA, UNSPECIFIED TYPE: ICD-10-CM

## 2022-05-18 DIAGNOSIS — I10 ESSENTIAL HYPERTENSION: ICD-10-CM

## 2022-05-18 RX ORDER — TRAZODONE HYDROCHLORIDE 50 MG/1
50 TABLET ORAL NIGHTLY
Qty: 30 TABLET | Refills: 3 | Status: SHIPPED | OUTPATIENT
Start: 2022-05-18 | End: 2022-05-18 | Stop reason: SDUPTHER

## 2022-05-18 RX ORDER — TRAZODONE HYDROCHLORIDE 50 MG/1
50 TABLET ORAL NIGHTLY
Qty: 30 TABLET | Refills: 3 | Status: ON HOLD | OUTPATIENT
Start: 2022-05-18 | End: 2022-05-22

## 2022-05-19 ENCOUNTER — HOSPITAL ENCOUNTER (EMERGENCY)
Facility: MEDICAL CENTER | Age: 67
DRG: 884 | End: 2022-05-20
Attending: EMERGENCY MEDICINE
Payer: MEDICARE

## 2022-05-19 ENCOUNTER — TELEPHONE (OUTPATIENT)
Dept: MEDICAL GROUP | Facility: LAB | Age: 67
End: 2022-05-19
Payer: MEDICARE

## 2022-05-19 DIAGNOSIS — R11.2 INTRACTABLE VOMITING WITH NAUSEA, UNSPECIFIED VOMITING TYPE: ICD-10-CM

## 2022-05-19 DIAGNOSIS — F19.959 STEROID-INDUCED PSYCHOSIS WITH COMPLICATION (HCC): ICD-10-CM

## 2022-05-19 DIAGNOSIS — K50.919 CROHN'S DISEASE WITH COMPLICATION, UNSPECIFIED GASTROINTESTINAL TRACT LOCATION (HCC): ICD-10-CM

## 2022-05-19 DIAGNOSIS — E86.0 DEHYDRATION: ICD-10-CM

## 2022-05-19 PROCEDURE — 93005 ELECTROCARDIOGRAM TRACING: CPT | Performed by: EMERGENCY MEDICINE

## 2022-05-19 PROCEDURE — 36415 COLL VENOUS BLD VENIPUNCTURE: CPT

## 2022-05-19 PROCEDURE — 99284 EMERGENCY DEPT VISIT MOD MDM: CPT

## 2022-05-19 PROCEDURE — 94760 N-INVAS EAR/PLS OXIMETRY 1: CPT

## 2022-05-19 RX ORDER — SODIUM CHLORIDE 9 MG/ML
1000 INJECTION, SOLUTION INTRAVENOUS ONCE
Status: COMPLETED | OUTPATIENT
Start: 2022-05-20 | End: 2022-05-20

## 2022-05-19 ASSESSMENT — FIBROSIS 4 INDEX: FIB4 SCORE: 1.24

## 2022-05-19 NOTE — TELEPHONE ENCOUNTER
1. Caller Name: Jana Overton                          Call Back Number: [phone] (home)         How would the patient prefer to be contacted with a response:     Pt wanted to update you, she had 8 hours of sleep last night. She still has tingling in her extremities.  
Afua:  Steve Bates thank you for the update, I am not sure what to do with the extremities tingling, I am more concerned about her sleeping at this point!  Regards, Chase Charles, DO    
complains of pain/discomfort

## 2022-05-19 NOTE — TELEPHONE ENCOUNTER
See if thisTina was seen today in the office with her , we have discussed that she has not slept in 3 days of written out for trazodone caused her to sleep.  Patient's vitals now.

## 2022-05-20 ENCOUNTER — APPOINTMENT (OUTPATIENT)
Dept: RADIOLOGY | Facility: MEDICAL CENTER | Age: 67
DRG: 884 | End: 2022-05-20
Attending: EMERGENCY MEDICINE
Payer: MEDICARE

## 2022-05-20 ENCOUNTER — HOSPITAL ENCOUNTER (INPATIENT)
Facility: MEDICAL CENTER | Age: 67
LOS: 1 days | DRG: 884 | End: 2022-05-22
Attending: EMERGENCY MEDICINE | Admitting: INTERNAL MEDICINE
Payer: MEDICARE

## 2022-05-20 VITALS
HEART RATE: 103 BPM | TEMPERATURE: 98.4 F | OXYGEN SATURATION: 95 % | WEIGHT: 173.06 LBS | HEIGHT: 72 IN | DIASTOLIC BLOOD PRESSURE: 78 MMHG | BODY MASS INDEX: 23.44 KG/M2 | SYSTOLIC BLOOD PRESSURE: 175 MMHG | RESPIRATION RATE: 24 BRPM

## 2022-05-20 DIAGNOSIS — E86.0 DEHYDRATION: ICD-10-CM

## 2022-05-20 DIAGNOSIS — F19.959 STEROID-INDUCED PSYCHOSIS WITH COMPLICATION (HCC): ICD-10-CM

## 2022-05-20 LAB
ALBUMIN SERPL BCP-MCNC: 4.5 G/DL (ref 3.2–4.9)
ALBUMIN SERPL BCP-MCNC: 4.6 G/DL (ref 3.2–4.9)
ALBUMIN/GLOB SERPL: 1.2 G/DL
ALBUMIN/GLOB SERPL: 1.3 G/DL
ALP SERPL-CCNC: 121 U/L (ref 30–99)
ALP SERPL-CCNC: 128 U/L (ref 30–99)
ALT SERPL-CCNC: 30 U/L (ref 2–50)
ALT SERPL-CCNC: 34 U/L (ref 2–50)
AMPHET UR QL SCN: NEGATIVE
ANION GAP SERPL CALC-SCNC: 18 MMOL/L (ref 7–16)
ANION GAP SERPL CALC-SCNC: 21 MMOL/L (ref 7–16)
APPEARANCE UR: CLEAR
AST SERPL-CCNC: 28 U/L (ref 12–45)
AST SERPL-CCNC: 28 U/L (ref 12–45)
BACTERIA #/AREA URNS HPF: NEGATIVE /HPF
BARBITURATES UR QL SCN: NEGATIVE
BASOPHILS # BLD AUTO: 0.5 % (ref 0–1.8)
BASOPHILS # BLD AUTO: 0.6 % (ref 0–1.8)
BASOPHILS # BLD: 0.07 K/UL (ref 0–0.12)
BASOPHILS # BLD: 0.08 K/UL (ref 0–0.12)
BENZODIAZ UR QL SCN: NEGATIVE
BILIRUB SERPL-MCNC: 0.7 MG/DL (ref 0.1–1.5)
BILIRUB SERPL-MCNC: 0.7 MG/DL (ref 0.1–1.5)
BILIRUB UR QL STRIP.AUTO: NEGATIVE
BUN SERPL-MCNC: 17 MG/DL (ref 8–22)
BUN SERPL-MCNC: 25 MG/DL (ref 8–22)
BZE UR QL SCN: NEGATIVE
CALCIUM SERPL-MCNC: 10 MG/DL (ref 8.4–10.2)
CALCIUM SERPL-MCNC: 9.8 MG/DL (ref 8.4–10.2)
CANNABINOIDS UR QL SCN: POSITIVE
CHLORIDE SERPL-SCNC: 101 MMOL/L (ref 96–112)
CHLORIDE SERPL-SCNC: 101 MMOL/L (ref 96–112)
CO2 SERPL-SCNC: 17 MMOL/L (ref 20–33)
CO2 SERPL-SCNC: 18 MMOL/L (ref 20–33)
COLOR UR: YELLOW
CREAT SERPL-MCNC: 1.17 MG/DL (ref 0.5–1.4)
CREAT SERPL-MCNC: 1.51 MG/DL (ref 0.5–1.4)
EKG IMPRESSION: NORMAL
EOSINOPHIL # BLD AUTO: 0.05 K/UL (ref 0–0.51)
EOSINOPHIL # BLD AUTO: 0.06 K/UL (ref 0–0.51)
EOSINOPHIL NFR BLD: 0.4 % (ref 0–6.9)
EOSINOPHIL NFR BLD: 0.5 % (ref 0–6.9)
EPI CELLS #/AREA URNS HPF: NEGATIVE /HPF
ERYTHROCYTE [DISTWIDTH] IN BLOOD BY AUTOMATED COUNT: 40.3 FL (ref 35.9–50)
ERYTHROCYTE [DISTWIDTH] IN BLOOD BY AUTOMATED COUNT: 41.7 FL (ref 35.9–50)
ETHANOL BLD-MCNC: <10.1 MG/DL
GFR SERPLBLD CREATININE-BSD FMLA CKD-EPI: 37 ML/MIN/1.73 M 2
GFR SERPLBLD CREATININE-BSD FMLA CKD-EPI: 51 ML/MIN/1.73 M 2
GLOBULIN SER CALC-MCNC: 3.6 G/DL (ref 1.9–3.5)
GLOBULIN SER CALC-MCNC: 3.7 G/DL (ref 1.9–3.5)
GLUCOSE SERPL-MCNC: 105 MG/DL (ref 65–99)
GLUCOSE SERPL-MCNC: 86 MG/DL (ref 65–99)
GLUCOSE UR STRIP.AUTO-MCNC: NEGATIVE MG/DL
HCT VFR BLD AUTO: 40.3 % (ref 37–47)
HCT VFR BLD AUTO: 41 % (ref 37–47)
HGB BLD-MCNC: 13.4 G/DL (ref 12–16)
HGB BLD-MCNC: 13.7 G/DL (ref 12–16)
HYALINE CASTS #/AREA URNS LPF: NORMAL /LPF
IMM GRANULOCYTES # BLD AUTO: 0.06 K/UL (ref 0–0.11)
IMM GRANULOCYTES # BLD AUTO: 0.08 K/UL (ref 0–0.11)
IMM GRANULOCYTES NFR BLD AUTO: 0.5 % (ref 0–0.9)
IMM GRANULOCYTES NFR BLD AUTO: 0.6 % (ref 0–0.9)
KETONES UR STRIP.AUTO-MCNC: 40 MG/DL
LACTATE BLD-SCNC: 2 MMOL/L (ref 0.5–2)
LACTATE BLD-SCNC: 2.3 MMOL/L (ref 0.5–2)
LACTATE BLD-SCNC: 3.5 MMOL/L (ref 0.5–2)
LEUKOCYTE ESTERASE UR QL STRIP.AUTO: ABNORMAL
LIPASE SERPL-CCNC: 13 U/L (ref 7–58)
LYMPHOCYTES # BLD AUTO: 1.66 K/UL (ref 1–4.8)
LYMPHOCYTES # BLD AUTO: 2.16 K/UL (ref 1–4.8)
LYMPHOCYTES NFR BLD: 12.5 % (ref 22–41)
LYMPHOCYTES NFR BLD: 16 % (ref 22–41)
MAGNESIUM SERPL-MCNC: 1.8 MG/DL (ref 1.5–2.5)
MCH RBC QN AUTO: 31 PG (ref 27–33)
MCH RBC QN AUTO: 31.2 PG (ref 27–33)
MCHC RBC AUTO-ENTMCNC: 33.3 G/DL (ref 33.6–35)
MCHC RBC AUTO-ENTMCNC: 33.4 G/DL (ref 33.6–35)
MCV RBC AUTO: 92.8 FL (ref 81.4–97.8)
MCV RBC AUTO: 93.9 FL (ref 81.4–97.8)
METHADONE UR QL SCN: NEGATIVE
MICRO URNS: ABNORMAL
MONOCYTES # BLD AUTO: 0.98 K/UL (ref 0–0.85)
MONOCYTES # BLD AUTO: 1.01 K/UL (ref 0–0.85)
MONOCYTES NFR BLD AUTO: 7.4 % (ref 0–13.4)
MONOCYTES NFR BLD AUTO: 7.5 % (ref 0–13.4)
NEUTROPHILS # BLD AUTO: 10.13 K/UL (ref 2–7.15)
NEUTROPHILS # BLD AUTO: 10.49 K/UL (ref 2–7.15)
NEUTROPHILS NFR BLD: 75 % (ref 44–72)
NEUTROPHILS NFR BLD: 78.5 % (ref 44–72)
NITRITE UR QL STRIP.AUTO: NEGATIVE
NRBC # BLD AUTO: 0 K/UL
NRBC # BLD AUTO: 0 K/UL
NRBC BLD-RTO: 0 /100 WBC
NRBC BLD-RTO: 0 /100 WBC
OPIATES UR QL SCN: NEGATIVE
OXYCODONE UR QL SCN: POSITIVE
PCP UR QL SCN: NEGATIVE
PH UR STRIP.AUTO: 5.5 [PH] (ref 5–8)
PHOSPHATE SERPL-MCNC: 1.8 MG/DL (ref 2.5–4.5)
PLATELET # BLD AUTO: 248 K/UL (ref 164–446)
PLATELET # BLD AUTO: 261 K/UL (ref 164–446)
PMV BLD AUTO: 9.9 FL (ref 9–12.9)
PMV BLD AUTO: 9.9 FL (ref 9–12.9)
POTASSIUM SERPL-SCNC: 3.5 MMOL/L (ref 3.6–5.5)
POTASSIUM SERPL-SCNC: 4 MMOL/L (ref 3.6–5.5)
PROPOXYPH UR QL SCN: NEGATIVE
PROT SERPL-MCNC: 8.2 G/DL (ref 6–8.2)
PROT SERPL-MCNC: 8.2 G/DL (ref 6–8.2)
PROT UR QL STRIP: NEGATIVE MG/DL
RBC # BLD AUTO: 4.29 M/UL (ref 4.2–5.4)
RBC # BLD AUTO: 4.42 M/UL (ref 4.2–5.4)
RBC # URNS HPF: NORMAL /HPF
RBC UR QL AUTO: NEGATIVE
SODIUM SERPL-SCNC: 137 MMOL/L (ref 135–145)
SODIUM SERPL-SCNC: 139 MMOL/L (ref 135–145)
SP GR UR STRIP.AUTO: 1.02
T4 FREE SERPL-MCNC: 1.65 NG/DL (ref 0.93–1.7)
TROPONIN T SERPL-MCNC: 14 NG/L (ref 6–19)
TSH SERPL DL<=0.005 MIU/L-ACNC: 0.2 UIU/ML (ref 0.38–5.33)
WBC # BLD AUTO: 13.3 K/UL (ref 4.8–10.8)
WBC # BLD AUTO: 13.5 K/UL (ref 4.8–10.8)
WBC #/AREA URNS HPF: NORMAL /HPF

## 2022-05-20 PROCEDURE — 81001 URINALYSIS AUTO W/SCOPE: CPT

## 2022-05-20 PROCEDURE — 700111 HCHG RX REV CODE 636 W/ 250 OVERRIDE (IP): Performed by: EMERGENCY MEDICINE

## 2022-05-20 PROCEDURE — 80053 COMPREHEN METABOLIC PANEL: CPT | Mod: 91

## 2022-05-20 PROCEDURE — 83690 ASSAY OF LIPASE: CPT

## 2022-05-20 PROCEDURE — 80307 DRUG TEST PRSMV CHEM ANLYZR: CPT

## 2022-05-20 PROCEDURE — 82077 ASSAY SPEC XCP UR&BREATH IA: CPT

## 2022-05-20 PROCEDURE — 85025 COMPLETE CBC W/AUTO DIFF WBC: CPT

## 2022-05-20 PROCEDURE — 94760 N-INVAS EAR/PLS OXIMETRY 1: CPT

## 2022-05-20 PROCEDURE — 80053 COMPREHEN METABOLIC PANEL: CPT

## 2022-05-20 PROCEDURE — 84443 ASSAY THYROID STIM HORMONE: CPT

## 2022-05-20 PROCEDURE — 96375 TX/PRO/DX INJ NEW DRUG ADDON: CPT

## 2022-05-20 PROCEDURE — 96374 THER/PROPH/DIAG INJ IV PUSH: CPT

## 2022-05-20 PROCEDURE — 84439 ASSAY OF FREE THYROXINE: CPT

## 2022-05-20 PROCEDURE — 93005 ELECTROCARDIOGRAM TRACING: CPT | Performed by: EMERGENCY MEDICINE

## 2022-05-20 PROCEDURE — 87040 BLOOD CULTURE FOR BACTERIA: CPT | Mod: 91

## 2022-05-20 PROCEDURE — 84484 ASSAY OF TROPONIN QUANT: CPT

## 2022-05-20 PROCEDURE — 83605 ASSAY OF LACTIC ACID: CPT

## 2022-05-20 PROCEDURE — 83735 ASSAY OF MAGNESIUM: CPT

## 2022-05-20 PROCEDURE — 36415 COLL VENOUS BLD VENIPUNCTURE: CPT

## 2022-05-20 PROCEDURE — 99285 EMERGENCY DEPT VISIT HI MDM: CPT

## 2022-05-20 PROCEDURE — 85025 COMPLETE CBC W/AUTO DIFF WBC: CPT | Mod: 91

## 2022-05-20 PROCEDURE — A9270 NON-COVERED ITEM OR SERVICE: HCPCS | Performed by: EMERGENCY MEDICINE

## 2022-05-20 PROCEDURE — 83605 ASSAY OF LACTIC ACID: CPT | Mod: 91

## 2022-05-20 PROCEDURE — 700105 HCHG RX REV CODE 258: Performed by: EMERGENCY MEDICINE

## 2022-05-20 PROCEDURE — 71045 X-RAY EXAM CHEST 1 VIEW: CPT

## 2022-05-20 PROCEDURE — 700102 HCHG RX REV CODE 250 W/ 637 OVERRIDE(OP): Performed by: EMERGENCY MEDICINE

## 2022-05-20 PROCEDURE — 84100 ASSAY OF PHOSPHORUS: CPT

## 2022-05-20 PROCEDURE — 87040 BLOOD CULTURE FOR BACTERIA: CPT

## 2022-05-20 RX ORDER — SODIUM CHLORIDE 9 MG/ML
1000 INJECTION, SOLUTION INTRAVENOUS ONCE
Status: COMPLETED | OUTPATIENT
Start: 2022-05-20 | End: 2022-05-20

## 2022-05-20 RX ORDER — LORAZEPAM 2 MG/ML
1 INJECTION INTRAMUSCULAR ONCE
Status: COMPLETED | OUTPATIENT
Start: 2022-05-20 | End: 2022-05-20

## 2022-05-20 RX ORDER — HYDROMORPHONE HYDROCHLORIDE 1 MG/ML
1 INJECTION, SOLUTION INTRAMUSCULAR; INTRAVENOUS; SUBCUTANEOUS ONCE
Status: COMPLETED | OUTPATIENT
Start: 2022-05-20 | End: 2022-05-20

## 2022-05-20 RX ORDER — ONDANSETRON 2 MG/ML
4 INJECTION INTRAMUSCULAR; INTRAVENOUS ONCE
Status: COMPLETED | OUTPATIENT
Start: 2022-05-20 | End: 2022-05-20

## 2022-05-20 RX ORDER — LORAZEPAM 2 MG/ML
0.5 INJECTION INTRAMUSCULAR ONCE
Status: COMPLETED | OUTPATIENT
Start: 2022-05-20 | End: 2022-05-20

## 2022-05-20 RX ORDER — SODIUM CHLORIDE 9 MG/ML
1000 INJECTION, SOLUTION INTRAVENOUS ONCE
Status: COMPLETED | OUTPATIENT
Start: 2022-05-20 | End: 2022-05-21

## 2022-05-20 RX ORDER — SODIUM CHLORIDE 9 MG/ML
1000 INJECTION, SOLUTION INTRAVENOUS ONCE
Status: COMPLETED | OUTPATIENT
Start: 2022-05-21 | End: 2022-05-20

## 2022-05-20 RX ORDER — METOPROLOL TARTRATE 50 MG/1
TABLET, FILM COATED ORAL
Qty: 180 TABLET | Refills: 0 | Status: SHIPPED | OUTPATIENT
Start: 2022-05-20 | End: 2022-08-28

## 2022-05-20 RX ORDER — ONDANSETRON 4 MG/1
4 TABLET, ORALLY DISINTEGRATING ORAL EVERY 6 HOURS PRN
Qty: 10 TABLET | Refills: 0 | Status: ON HOLD | OUTPATIENT
Start: 2022-05-20 | End: 2022-05-25 | Stop reason: SDUPTHER

## 2022-05-20 RX ADMIN — NYSTATIN 500000 UNITS: 100000 SUSPENSION ORAL at 03:39

## 2022-05-20 RX ADMIN — LORAZEPAM 0.5 MG: 2 INJECTION INTRAMUSCULAR; INTRAVENOUS at 03:39

## 2022-05-20 RX ADMIN — ONDANSETRON HYDROCHLORIDE 4 MG: 2 SOLUTION INTRAMUSCULAR; INTRAVENOUS at 00:38

## 2022-05-20 RX ADMIN — SODIUM CHLORIDE 1000 ML: 9 INJECTION, SOLUTION INTRAVENOUS at 23:54

## 2022-05-20 RX ADMIN — SODIUM CHLORIDE 1000 ML: 9 INJECTION, SOLUTION INTRAVENOUS at 00:30

## 2022-05-20 RX ADMIN — ONDANSETRON 4 MG: 2 INJECTION INTRAMUSCULAR; INTRAVENOUS at 22:58

## 2022-05-20 RX ADMIN — HYDROMORPHONE HYDROCHLORIDE 1 MG: 1 INJECTION, SOLUTION INTRAMUSCULAR; INTRAVENOUS; SUBCUTANEOUS at 00:38

## 2022-05-20 RX ADMIN — SODIUM CHLORIDE 1000 ML: 9 INJECTION, SOLUTION INTRAVENOUS at 02:14

## 2022-05-20 RX ADMIN — SODIUM CHLORIDE 1000 ML: 9 INJECTION, SOLUTION INTRAVENOUS at 04:00

## 2022-05-20 RX ADMIN — SODIUM CHLORIDE 1000 ML: 9 INJECTION, SOLUTION INTRAVENOUS at 22:39

## 2022-05-20 RX ADMIN — SODIUM CHLORIDE 1000 ML: 9 INJECTION, SOLUTION INTRAVENOUS at 23:00

## 2022-05-20 RX ADMIN — LORAZEPAM 1 MG: 2 INJECTION INTRAMUSCULAR; INTRAVENOUS at 22:58

## 2022-05-20 ASSESSMENT — PAIN DESCRIPTION - PAIN TYPE
TYPE: ACUTE PAIN
TYPE: ACUTE PAIN

## 2022-05-20 ASSESSMENT — FIBROSIS 4 INDEX: FIB4 SCORE: 1.32

## 2022-05-20 NOTE — ED PROVIDER NOTES
ED Provider Note  ED Provider Note    Scribed for Bennett Maya by Bennett Maya. 5/20/2022  12:13 AM    Primary care provider: Chase Charles D.O.  Means of arrival: Private vehicle  History obtained from: Patient  History limited by: None    CHIEF COMPLAINT  Chief Complaint   Patient presents with   • Sent by MD     Pt has hx of Crohn's, lupus, and hx of autoimmune diseases. Pt has N/V for the past 3 days. Pt is dehydrated and sent by her MD.    • N/V     HPI  Jana Overton is a 66 y.o. female who presents to the Emergency Department with persistent nausea, vomiting and dehydration.  Patient is a complicated past medical history including multiple autoimmune diseases such as rheumatoid arthritis, lupus and Crohn's disease.  She has multiple flareups a year that are similar where she becomes dehydrated but is usually able to manage as an outpatient.  She spoke with her primary care provider and they recommend coming in today as she was showing clinical signs of dehydration including decreased urine output, dark urine and dry mucous membranes.  Denies fevers, cough, shortness of breath, abdominal pain.  She did recently start a med Dosepak of steroids for her Crohn's flare last week.  She is not minimal sleep for the last 3-4 nights due to the nausea and vomiting and is moderately agitated upon arrival here to the ED.  Quality: Dehydration, persistent intractable nausea vomiting  Duration: 1 month  Severity: Moderate  Associated sx: Dehydration    REVIEW OF SYSTEMS  As above, all other systems reviewed and are negative.   See HPI for further details.     PAST MEDICAL HISTORY   has a past medical history of Anesthesia, Anxiety, Arthritis, ASTHMA, Atrial fib/flut, Backpain, Bronchitis, Chronic pain, Colostomy in place ([facility]) (10/14/2010), COPD (chronic obstructive pulmonary disease) ([facility]) (6/24/2014), COPD (chronic obstructive pulmonary disease) ([facility]) (6/24/2014), Crohn's disease of colon ([facility]),  Dyspnea, History of cardiac murmur, Hypokalemia (6/24/2014), Ileostomy present ([facility]), Infectious disease, Multiple falls (6/24/2014), Narcotic dependence ([facility]) (9/23/2009), Obstruction, Other specified disorder of intestines, Pain (7/11/13), Pneumonia, Postherpetic neuralgia (6/24/2014), Rosacea (6/24/2014), and Sleep apnea.  SURGICAL HISTORY   has a past surgical history that includes lumbar fusion anterior; cervical disk and fusion anterior; foot surgery; hand surgery; other abdominal surgery; gastroscopy with biopsy (11/22/08); ileostomy (11/11/2009); dilation and curettage (9/24/2010); gastroscopy (10/4/2011); colonoscopy (10/4/2011); hardware removal neuro (7/16/2012); mammoplasty reduction (7/17/2013); ileostomy (5/14/2014); breast reduction; abdominal exploration; lumpectomy; colon resection; ileostomy (12/29/2018); sigmoidoscopy flex (12/29/2018); exploratory laparotomy (12/29/2018); gastroscopy (1/6/2019); gastroscopy (N/A, 5/22/2019); ileostomy (1/20/2021); lysis adhesions general (1/20/2021); cysto/uretero/pyeloscopy, dx (Right, 12/8/2021); cysto stent placemnt pre surg (Right, 12/8/2021); cystoscopy,insert ureteral stent (Right, 12/23/2021); and cysto/uretero/pyeloscopy, dx (Right, 12/23/2021).  SOCIAL HISTORY  Social History     Tobacco Use   • Smoking status: Never Smoker   • Smokeless tobacco: Never Used   • Tobacco comment: second hand smoke parents - smoked for only 1 year many years ago   Vaping Use   • Vaping Use: Never used   Substance Use Topics   • Alcohol use: Not Currently     Alcohol/week: 0.6 oz     Types: 1 Shots of liquor per week     Comment: gin and half a lime; tonic water   • Drug use: Not Currently     Types: Marijuana, Inhaled     Comment: medical marijuana through bong      Social History     Substance and Sexual Activity   Drug Use Not Currently   • Types: Marijuana, Inhaled    Comment: medical marijuana through bong     FAMILY HISTORY  Family History   Problem Relation Age of  "Onset   • Lung Disease Mother         copd   • Diabetes Father    • Heart Disease Father    • Diabetes Sister    • Cancer Maternal Aunt 40        breast     CURRENT MEDICATIONS  Home Medications    **Home medications have not yet been reviewed for this encounter**       ALLERGIES  Allergies   Allergen Reactions   • Azathioprine Sodium Hives and Shortness of Breath   • Infliximab Hives, Shortness of Breath and Rash     .   • Methotrexate Hives, Shortness of Breath and Rash     .   • Methotrexate Hives, Rash and Shortness of Breath     .   • Morphine Shortness of Breath, Swelling and Palpitations   • Promethazine Shortness of Breath and Rash     .   • Roxanol [Morphine Sulfate] Swelling     Throat swells   • Amitriptyline Unspecified     Nightmares     • Carafate [Sucralfate] Vomiting and Nausea     Generalizes/Mouth Sores   • Demerol Vomiting   • Duragesic [Fentanyl]      \"Patches didn't work\"  Skin broke down   • Fentanyl Unspecified     Patches caused skin breakdown, no pain relief   • Lorazepam Unspecified     States give me nightmares.    • Meperidine Vomiting   • Methadone Unspecified     Didn't work   • Methadone Unspecified     Didn't work   • Other Drug Unspecified     steroids   • Pregabalin Rash     Rash     • Pseudoephedrine Vomiting   • Sulfa Drugs Hives and Rash     .  .   • Betamethasone Unspecified     Pt unable to remember   • Celestone [Betamethasone Sodium Phosphate] Unspecified     Pt unable to remember   • Sulfasalazine Rash     On chest       PHYSICAL EXAM    VITAL SIGNS:   Vitals:    05/20/22 0201 05/20/22 0216 05/20/22 0412 05/20/22 0413   BP:  (!) 151/89 (!) 150/59    Pulse: (!) 125 (!) 146 (!) 106 (!) 103   Resp:   (!) 24    Temp:       SpO2: 96% 93% 95% 95%   Weight:       Height:         Vitals: My interpretation: Hypertensive, tachycardic, afebrile, not hypoxic    Reinterpretation of vitals: Improved    Cardiac Monitor Interpretation: The cardiac monitor revealed normal Sinus Rhythm with " tachycardia as interpreted by me. The cardiac monitor was ordered secondary to the patient's history of dehydration and to monitor for dysrhythmia and/or tachycardia.    PE:   Constitutional: Well developed, Well nourished, No acute distress, Non-toxic appearance.   HENT: Normocephalic, Atraumatic, Bilateral external ears normal, Oropharynx is clear mucous membranes are moist. No oral exudates or nasal discharge.   Eyes: Pupils are equal round and reactive, EOMI, Conjunctiva normal, No discharge.   Neck: Normal range of motion, No tenderness, Supple, No stridor. No meningismus.  Lymphatic: No lymphadenopathy noted.   Cardiovascular: Regular rate and rhythm without murmur rub or gallop.  Thorax & Lungs: Clear breath sounds bilaterally without wheezes, rhonchi or rales. There is no chest wall tenderness.   Abdomen: Soft non-tender non-distended. There is no rebound or guarding. No organomegaly is appreciated. Bowel sounds are normal.  Ileostomy in place, no output  Skin: Normal without rash.   Back: No CVA or spinal tenderness.   Extremities: Intact distal pulses, No edema, No tenderness, No cyanosis, No clubbing. Capillary refill is less than 2 seconds.  Musculoskeletal: Good range of motion in all major joints. No tenderness to palpation or major deformities noted.   Neurologic: Alert & oriented x 3, Normal motor function, Normal sensory function, No focal deficits noted. Reflexes are normal.  Psychiatric: Animated affect, Judgment normal, Mood normal. There is no suicidal ideation or patient reported hallucinations.     DIAGNOSTIC STUDIES / PROCEDURES    LABS  Results for orders placed or performed during the hospital encounter of 05/19/22   CBC WITH DIFFERENTIAL   Result Value Ref Range    WBC 13.3 (H) 4.8 - 10.8 K/uL    RBC 4.42 4.20 - 5.40 M/uL    Hemoglobin 13.7 12.0 - 16.0 g/dL    Hematocrit 41.0 37.0 - 47.0 %    MCV 92.8 81.4 - 97.8 fL    MCH 31.0 27.0 - 33.0 pg    MCHC 33.4 (L) 33.6 - 35.0 g/dL    RDW 40.3  35.9 - 50.0 fL    Platelet Count 248 164 - 446 K/uL    MPV 9.9 9.0 - 12.9 fL    Neutrophils-Polys 78.50 (H) 44.00 - 72.00 %    Lymphocytes 12.50 (L) 22.00 - 41.00 %    Monocytes 7.40 0.00 - 13.40 %    Eosinophils 0.50 0.00 - 6.90 %    Basophils 0.60 0.00 - 1.80 %    Immature Granulocytes 0.50 0.00 - 0.90 %    Nucleated RBC 0.00 /100 WBC    Neutrophils (Absolute) 10.49 (H) 2.00 - 7.15 K/uL    Lymphs (Absolute) 1.66 1.00 - 4.80 K/uL    Monos (Absolute) 0.98 (H) 0.00 - 0.85 K/uL    Eos (Absolute) 0.06 0.00 - 0.51 K/uL    Baso (Absolute) 0.08 0.00 - 0.12 K/uL    Immature Granulocytes (abs) 0.06 0.00 - 0.11 K/uL    NRBC (Absolute) 0.00 K/uL   COMP METABOLIC PANEL   Result Value Ref Range    Sodium 137 135 - 145 mmol/L    Potassium 4.0 3.6 - 5.5 mmol/L    Chloride 101 96 - 112 mmol/L    Co2 18 (L) 20 - 33 mmol/L    Anion Gap 18.0 (H) 7.0 - 16.0    Glucose 105 (H) 65 - 99 mg/dL    Bun 25 (H) 8 - 22 mg/dL    Creatinine 1.51 (H) 0.50 - 1.40 mg/dL    Calcium 10.0 8.4 - 10.2 mg/dL    AST(SGOT) 28 12 - 45 U/L    ALT(SGPT) 34 2 - 50 U/L    Alkaline Phosphatase 128 (H) 30 - 99 U/L    Total Bilirubin 0.7 0.1 - 1.5 mg/dL    Albumin 4.5 3.2 - 4.9 g/dL    Total Protein 8.2 6.0 - 8.2 g/dL    Globulin 3.7 (H) 1.9 - 3.5 g/dL    A-G Ratio 1.2 g/dL   LIPASE   Result Value Ref Range    Lipase 13 7 - 58 U/L   LACTIC ACID   Result Value Ref Range    Lactic Acid 2.3 (H) 0.5 - 2.0 mmol/L   MAGNESIUM   Result Value Ref Range    Magnesium 1.8 1.5 - 2.5 mg/dL   PHOSPHORUS   Result Value Ref Range    Phosphorus 1.8 (L) 2.5 - 4.5 mg/dL   ESTIMATED GFR   Result Value Ref Range    GFR (CKD-EPI) 37 (A) >60 mL/min/1.73 m 2   LACTIC ACID   Result Value Ref Range    Lactic Acid 2.0 0.5 - 2.0 mmol/L   URINE DRUG SCREEN   Result Value Ref Range    Amphetamines Urine Negative Negative    Barbiturates Negative Negative    Benzodiazepines Negative Negative    Cocaine Metabolite Negative Negative    Methadone Negative Negative    Opiates Negative Negative     Oxycodone Positive (A) Negative    Phencyclidine -Pcp Negative Negative    Propoxyphene Negative Negative    Cannabinoid Metab Positive (A) Negative   URINALYSIS CULTURE, IF INDICATED    Specimen: Urine   Result Value Ref Range    Color Yellow     Character Clear     Specific Gravity 1.025 <1.035    Ph 5.5 5.0 - 8.0    Glucose Negative Negative mg/dL    Ketones 40 (A) Negative mg/dL    Protein Negative Negative mg/dL    Bilirubin Negative Negative    Nitrite Negative Negative    Leukocyte Esterase Trace (A) Negative    Occult Blood Negative Negative    Micro Urine Req Microscopic    URINE MICROSCOPIC (W/UA)   Result Value Ref Range    WBC 2-5 /hpf    RBC 0-2 /hpf    Bacteria Negative None /hpf    Epithelial Cells Negative Few /hpf    Hyaline Cast 0-2 /lpf   ETHYL ALCOHOL (BLOOD)   Result Value Ref Range    Diagnostic Alcohol <10.1 <10.1 mg/dL     *Note: Due to a large number of results and/or encounters for the requested time period, some results have not been displayed. A complete set of results can be found in Results Review.      All labs reviewed by me. Significant for mild leukocytosis of 13, no anemia, normal electrolytes, slightly decreased bicarbonate of 18, slight increased creatinine of 1.51, normal liver enzymes, normal bilirubin, lipase normal, lactic acidosis of 2.3, magnesium normal, phosphorus slightly low    EKG: EKG done at 12:04 AM shows sinus tachycardia rate of 129, normal axis, normal intervals, no ST elevation or depression    RADIOLOGY  No orders to display     The radiologist's interpretation of all radiological studies have been reviewed by me.    COURSE & MEDICAL DECISION MAKING  Nursing notes, VS, PMSFHx, labs, imaging, EKG reviewed in chart.    MDM: 12:13 AM Jana Overton is a 66 y.o. female who presented with dehydration, intractable nausea vomiting and Crohn's flare.  Longstanding history of this.  Abdomen is benign, ileostomy in place.  Vital signs show tachycardia upon arrival but  she is afebrile.  Labs concerning for multiple derangements including slight leukocytosis, slight lactic acidosis, mild elevation of creatinine to 1.51 but otherwise normal.  She was started on 2 L rehydration bolus.  Pain medication as well as antiemetics administered.  Dry mucous membranes present on initial exam. EKG w/ sinus tachycardia. But no ischemia.  I discussed with the patient that I recommend inpatient admission for continued hydration and after shared decision-making patient declined stating she has a  she has to go to at 9 AM in the morning.  I discussed risks and benefits of this, my concerns.  She is alert and oriented and has decision-making capacity at this time.  She is placed on ED observation for continued hydration and reevaluation.  Repeat lactic acid is improving.  I recommended returning to the ED if she change her mind about inpatient admission and she verbalized understanding plan.  Ambulatory time of discharge, no acute distress, vital signs improved.  I discussed the case in detail with the patient's .  He agrees that she has had some mild steroid-induced psychosis and has not slept well for the past 3 to 4 days which is led to her mild agitation.  At this time he does agree though that she has decision-making capacity.  He states that she is very stubborn and has made up her mind that she will not stay in the hospital.  At this time patient does not meet capacity for legal hold, is alert and oriented and able to make decisions for herself.    HYDRATION: Based on the patient's presentation of Acute Vomiting, Dehydration and Inability to take oral fluids the patient was given IV fluids. IV Hydration was used because oral hydration was not adequate alone. Upon recheck following hydration, the patient was imroved.    12:00 AM: Patient placed on ED observation for rehydration, reevaluation of lactic acidosis.  No contributing family history.    4:43 AM patient reevaluated.   Improving hydration status and heart rate his improved from 142 around 100.  I again discussed with patient that for to keep her in the hospital but this time she is asking for discharge.  She is again alert and oriented.  Her  was called to bring her home.  I discussed return to the ED if she changes her mind or would like further evaluation or treatment or admission which would be my recommendation.    FINAL IMPRESSION  1. Dehydration Acute   2. Intractable vomiting with nausea, unspecified vomiting type Acute   3. Crohn's disease with complication, unspecified gastrointestinal tract location (HCC) Acute   4. Steroid-induced psychosis with complication (HCC) Acute      The note accurately reflects work and decisions made by me.  Bennett Maya  5/20/2022  12:15 AM

## 2022-05-20 NOTE — ED TRIAGE NOTES
Chief Complaint   Patient presents with   • Sent by MD     Pt has hx of Crohn's, lupus, and hx of autoimmune diseases. Pt has N/V for the past 3 days. Pt is dehydrated and sent by her MD.    • N/V     Pt became extremely agitated at RN during triage and refused to answer triage and screening questions.     BP (!) 153/100   Pulse (!) 123   Temp 36.9 °C (98.4 °F)   Resp 18   Ht 1.829 m (6')   Wt 78.5 kg (173 lb 1 oz)   SpO2 95%   BMI 23.47 kg/m²

## 2022-05-20 NOTE — DISCHARGE INSTRUCTIONS
As I told you, I would much prefer to admit to the hospital for continued monitoring and hydration.  You have refused.  If you do change your mind and would like to be admitted, please return to the hospital.  Try to stay well-hydrated, take Zofran to help with nausea and vomiting and use the nystatin solution.  Follow-up closely with your primary care provider for further management treatment and come back if you would like further evaluation in the emergency department.  Thank you for coming in today.

## 2022-05-21 PROBLEM — G93.40 ACUTE ENCEPHALOPATHY: Status: ACTIVE | Noted: 2022-05-21

## 2022-05-21 PROBLEM — R73.9 HYPERGLYCEMIA: Status: ACTIVE | Noted: 2022-05-21

## 2022-05-21 PROBLEM — E87.29 HIGH ANION GAP METABOLIC ACIDOSIS: Status: ACTIVE | Noted: 2019-05-19

## 2022-05-21 PROBLEM — F19.959 STEROID-INDUCED PSYCHOSIS WITH COMPLICATION (HCC): Status: ACTIVE | Noted: 2022-05-21

## 2022-05-21 LAB — LACTATE BLD-SCNC: 2 MMOL/L (ref 0.5–2)

## 2022-05-21 PROCEDURE — 770006 HCHG ROOM/CARE - MED/SURG/GYN SEMI*

## 2022-05-21 PROCEDURE — 83605 ASSAY OF LACTIC ACID: CPT

## 2022-05-21 PROCEDURE — 700102 HCHG RX REV CODE 250 W/ 637 OVERRIDE(OP): Performed by: INTERNAL MEDICINE

## 2022-05-21 PROCEDURE — 700102 HCHG RX REV CODE 250 W/ 637 OVERRIDE(OP): Performed by: HOSPITALIST

## 2022-05-21 PROCEDURE — 700101 HCHG RX REV CODE 250: Performed by: INTERNAL MEDICINE

## 2022-05-21 PROCEDURE — A9270 NON-COVERED ITEM OR SERVICE: HCPCS | Performed by: HOSPITALIST

## 2022-05-21 PROCEDURE — 99223 1ST HOSP IP/OBS HIGH 75: CPT | Performed by: INTERNAL MEDICINE

## 2022-05-21 PROCEDURE — 700111 HCHG RX REV CODE 636 W/ 250 OVERRIDE (IP): Performed by: INTERNAL MEDICINE

## 2022-05-21 PROCEDURE — A9270 NON-COVERED ITEM OR SERVICE: HCPCS | Performed by: INTERNAL MEDICINE

## 2022-05-21 RX ORDER — POLYETHYLENE GLYCOL 3350 17 G/17G
1 POWDER, FOR SOLUTION ORAL
Status: DISCONTINUED | OUTPATIENT
Start: 2022-05-21 | End: 2022-05-22 | Stop reason: HOSPADM

## 2022-05-21 RX ORDER — METOPROLOL TARTRATE 50 MG/1
50 TABLET, FILM COATED ORAL TWICE DAILY
Status: DISCONTINUED | OUTPATIENT
Start: 2022-05-21 | End: 2022-05-22 | Stop reason: HOSPADM

## 2022-05-21 RX ORDER — HYDRALAZINE HYDROCHLORIDE 20 MG/ML
10 INJECTION INTRAMUSCULAR; INTRAVENOUS EVERY 4 HOURS PRN
Status: DISCONTINUED | OUTPATIENT
Start: 2022-05-21 | End: 2022-05-22 | Stop reason: HOSPADM

## 2022-05-21 RX ORDER — SODIUM CHLORIDE AND POTASSIUM CHLORIDE 150; 900 MG/100ML; MG/100ML
INJECTION, SOLUTION INTRAVENOUS CONTINUOUS
Status: DISCONTINUED | OUTPATIENT
Start: 2022-05-21 | End: 2022-05-22 | Stop reason: HOSPADM

## 2022-05-21 RX ORDER — BISACODYL 10 MG
10 SUPPOSITORY, RECTAL RECTAL
Status: DISCONTINUED | OUTPATIENT
Start: 2022-05-21 | End: 2022-05-22 | Stop reason: HOSPADM

## 2022-05-21 RX ORDER — AMOXICILLIN 250 MG
2 CAPSULE ORAL 2 TIMES DAILY
Status: DISCONTINUED | OUTPATIENT
Start: 2022-05-21 | End: 2022-05-22 | Stop reason: HOSPADM

## 2022-05-21 RX ORDER — DIAZEPAM 2 MG/1
2 TABLET ORAL EVERY 6 HOURS PRN
Status: DISCONTINUED | OUTPATIENT
Start: 2022-05-21 | End: 2022-05-22 | Stop reason: HOSPADM

## 2022-05-21 RX ORDER — ONDANSETRON 2 MG/ML
4 INJECTION INTRAMUSCULAR; INTRAVENOUS EVERY 4 HOURS PRN
Status: DISCONTINUED | OUTPATIENT
Start: 2022-05-21 | End: 2022-05-22 | Stop reason: HOSPADM

## 2022-05-21 RX ORDER — OXYCODONE HYDROCHLORIDE 10 MG/1
10 TABLET ORAL EVERY 6 HOURS PRN
Status: DISCONTINUED | OUTPATIENT
Start: 2022-05-21 | End: 2022-05-22 | Stop reason: HOSPADM

## 2022-05-21 RX ORDER — ONDANSETRON 4 MG/1
4 TABLET, ORALLY DISINTEGRATING ORAL EVERY 4 HOURS PRN
Status: DISCONTINUED | OUTPATIENT
Start: 2022-05-21 | End: 2022-05-22 | Stop reason: HOSPADM

## 2022-05-21 RX ORDER — LORAZEPAM 2 MG/ML
1 INJECTION INTRAMUSCULAR EVERY 4 HOURS PRN
Status: DISCONTINUED | OUTPATIENT
Start: 2022-05-21 | End: 2022-05-21

## 2022-05-21 RX ORDER — CETIRIZINE HYDROCHLORIDE 10 MG/1
10 TABLET ORAL
Status: DISCONTINUED | OUTPATIENT
Start: 2022-05-21 | End: 2022-05-22 | Stop reason: HOSPADM

## 2022-05-21 RX ADMIN — ONDANSETRON HYDROCHLORIDE 4 MG: 2 SOLUTION INTRAMUSCULAR; INTRAVENOUS at 17:02

## 2022-05-21 RX ADMIN — SODIUM CHLORIDE AND POTASSIUM CHLORIDE: 9; 1.49 INJECTION, SOLUTION INTRAVENOUS at 03:08

## 2022-05-21 RX ADMIN — RIVAROXABAN 10 MG: 10 TABLET, FILM COATED ORAL at 17:03

## 2022-05-21 RX ADMIN — LORAZEPAM 1 MG: 2 INJECTION INTRAMUSCULAR; INTRAVENOUS at 16:54

## 2022-05-21 RX ADMIN — CETIRIZINE HYDROCHLORIDE 10 MG: 10 TABLET, FILM COATED ORAL at 17:11

## 2022-05-21 RX ADMIN — SODIUM CHLORIDE AND POTASSIUM CHLORIDE: 9; 1.49 INJECTION, SOLUTION INTRAVENOUS at 16:54

## 2022-05-21 RX ADMIN — LORAZEPAM 1 MG: 2 INJECTION INTRAMUSCULAR; INTRAVENOUS at 05:19

## 2022-05-21 RX ADMIN — METOPROLOL TARTRATE 50 MG: 50 TABLET, FILM COATED ORAL at 17:03

## 2022-05-21 RX ADMIN — ONDANSETRON HYDROCHLORIDE 4 MG: 2 SOLUTION INTRAMUSCULAR; INTRAVENOUS at 10:10

## 2022-05-21 RX ADMIN — LORAZEPAM 1 MG: 2 INJECTION INTRAMUSCULAR; INTRAVENOUS at 10:10

## 2022-05-21 RX ADMIN — OXYCODONE HYDROCHLORIDE 10 MG: 10 TABLET ORAL at 23:16

## 2022-05-21 ASSESSMENT — COGNITIVE AND FUNCTIONAL STATUS - GENERAL
DAILY ACTIVITIY SCORE: 24
SUGGESTED CMS G CODE MODIFIER MOBILITY: CH
SUGGESTED CMS G CODE MODIFIER DAILY ACTIVITY: CH
MOBILITY SCORE: 24

## 2022-05-21 ASSESSMENT — LIFESTYLE VARIABLES
ALCOHOL_USE: NO
TOTAL SCORE: 0
TOTAL SCORE: 0
HOW MANY TIMES IN THE PAST YEAR HAVE YOU HAD 5 OR MORE DRINKS IN A DAY: 0
ON A TYPICAL DAY WHEN YOU DRINK ALCOHOL HOW MANY DRINKS DO YOU HAVE: 0
HAVE PEOPLE ANNOYED YOU BY CRITICIZING YOUR DRINKING: NO
AVERAGE NUMBER OF DAYS PER WEEK YOU HAVE A DRINK CONTAINING ALCOHOL: 0
EVER FELT BAD OR GUILTY ABOUT YOUR DRINKING: NO
TOTAL SCORE: 0
CONSUMPTION TOTAL: NEGATIVE
EVER HAD A DRINK FIRST THING IN THE MORNING TO STEADY YOUR NERVES TO GET RID OF A HANGOVER: NO
HAVE YOU EVER FELT YOU SHOULD CUT DOWN ON YOUR DRINKING: NO

## 2022-05-21 ASSESSMENT — ENCOUNTER SYMPTOMS
NERVOUS/ANXIOUS: 1
ABDOMINAL PAIN: 0
VOMITING: 0
EYE DISCHARGE: 0
STRIDOR: 0
SHORTNESS OF BREATH: 0
FEVER: 0
FOCAL WEAKNESS: 0
MYALGIAS: 0
FLANK PAIN: 0
CHILLS: 0
COUGH: 0
EYE REDNESS: 0
BRUISES/BLEEDS EASILY: 0

## 2022-05-21 ASSESSMENT — FIBROSIS 4 INDEX: FIB4 SCORE: 1.33

## 2022-05-21 ASSESSMENT — PAIN DESCRIPTION - PAIN TYPE
TYPE: ACUTE PAIN
TYPE: ACUTE PAIN

## 2022-05-21 NOTE — ASSESSMENT & PLAN NOTE
Recent flare, was on steroids and antibiotics  Soft abdomen pain slightly better now.  Multimodal pain control

## 2022-05-21 NOTE — PROGRESS NOTES
RN received report and assumed pt care. Pt assessed. Pt is anxious with forced speech and poor safety awareness. Pt reoriented and safety education provided.

## 2022-05-21 NOTE — H&P
Hospital Medicine History & Physical Note    Date of Service  5/21/2022    Primary Care Physician  Chase Charles D.O.    Consultants  None    Specialist Names: None    Code Status  Full Code    Chief Complaint  Chief Complaint   Patient presents with   • Abdominal Pain     Chrons flare up  was seen here this morning for same  this flare up started on Wednesday  not having pain at this time   having N/V    • N/V     On going since being seen today    • Dehydration     Has not been able to tolerate PO fluid or eat for the last couple of days        History of Presenting Illness  Jana Overton is a 66 y.o. female who presented 5/20/2022 with altered mental status.  Patient does have a history of Crohn's disease, recently had a flare and was placed on antibiotics as well as steroids.  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  Patient has not slept in approximately 4 days and has become altered.  Patient came in yesterday with similar symptoms, did improve with IV fluids and refused admission.  Today she is again very confused.  Patient has been medicated, during my exam she is somnolent, information obtained from the chart.  ERP did discuss the case with her significant other who denied any significant additional complaints.  I did discuss the case including labs and imaging with the ER physician.    I discussed the plan of care with ERP.    Review of Systems  Review of Systems   Unable to perform ROS: Acuity of condition       Past Medical History   has a past medical history of Anesthesia, Anxiety, Arthritis, ASTHMA, Atrial fib/flut, Backpain, Bronchitis, Chronic pain, Colostomy in place ([facility]) (10/14/2010), COPD (chronic obstructive pulmonary disease) ([facility]) (6/24/2014), COPD (chronic obstructive pulmonary disease) ([facility]) (6/24/2014), Crohn's disease of colon ([facility]), Dyspnea, History of cardiac murmur, Hypokalemia (6/24/2014), Ileostomy present ([facility]),  Infectious disease, Multiple falls (6/24/2014), Narcotic dependence (HCC) (9/23/2009), Obstruction, Other specified disorder of intestines, Pain (7/11/13), Pneumonia, Postherpetic neuralgia (6/24/2014), Rosacea (6/24/2014), and Sleep apnea.    Surgical History   has a past surgical history that includes lumbar fusion anterior; cervical disk and fusion anterior; foot surgery; hand surgery; other abdominal surgery; gastroscopy with biopsy (11/22/08); ileostomy (11/11/2009); dilation and curettage (9/24/2010); gastroscopy (10/4/2011); colonoscopy (10/4/2011); hardware removal neuro (7/16/2012); mammoplasty reduction (7/17/2013); ileostomy (5/14/2014); pr breast reduction; abdominal exploration; lumpectomy; colon resection; ileostomy (12/29/2018); sigmoidoscopy flex (12/29/2018); exploratory laparotomy (12/29/2018); gastroscopy (1/6/2019); gastroscopy (N/A, 5/22/2019); ileostomy (1/20/2021); lysis adhesions general (1/20/2021); pr cysto/uretero/pyeloscopy, dx (Right, 12/8/2021); cysto stent placemnt pre surg (Right, 12/8/2021); pr cystoscopy,insert ureteral stent (Right, 12/23/2021); and pr cysto/uretero/pyeloscopy, dx (Right, 12/23/2021).     Family History  family history includes Cancer (age of onset: 40) in her maternal aunt; Diabetes in her father and sister; Heart Disease in her father; Lung Disease in her mother.   Family history reviewed with patient. There is no family history that is pertinent to the chief complaint.     Social History   reports that she has never smoked. She has never used smokeless tobacco. She reports previous alcohol use of about 0.6 oz of alcohol per week. She reports previous drug use. Drugs: Marijuana and Inhaled.    Allergies  Allergies   Allergen Reactions   • Azathioprine Sodium Hives and Shortness of Breath   • Infliximab Hives, Shortness of Breath and Rash     .   • Methotrexate Hives, Shortness of Breath and Rash     .   • Methotrexate Hives, Rash and Shortness of Breath     .   •  "Morphine Shortness of Breath, Swelling and Palpitations   • Promethazine Shortness of Breath and Rash     .   • Roxanol [Morphine Sulfate] Swelling     Throat swells   • Amitriptyline Unspecified     Nightmares     • Carafate [Sucralfate] Vomiting and Nausea     Generalizes/Mouth Sores   • Demerol Vomiting   • Duragesic [Fentanyl]      \"Patches didn't work\"  Skin broke down   • Fentanyl Unspecified     Patches caused skin breakdown, no pain relief   • Lorazepam Unspecified     States give me nightmares.    • Meperidine Vomiting   • Methadone Unspecified     Didn't work   • Methadone Unspecified     Didn't work   • Other Drug Unspecified     steroids   • Pregabalin Rash     Rash     • Pseudoephedrine Vomiting   • Sulfa Drugs Hives and Rash     .  .   • Betamethasone Unspecified     Pt unable to remember   • Celestone [Betamethasone Sodium Phosphate] Unspecified     Pt unable to remember   • Sulfasalazine Rash     On chest       Medications  Prior to Admission Medications   Prescriptions Last Dose Informant Patient Reported? Taking?   EPINEPHrine (EPIPEN) 0.3 MG/0.3ML Solution Auto-injector solution for injection  Patient Yes No   Sig: Inject 0.3 mg into the shoulder, thigh, or buttocks one time. Indications: Life-Threatening Hypersensitivity Reaction   Naloxone (NARCAN) 4 MG/0.1ML Liquid  Patient Yes No   Sig: Administer 4 mg into affected nostril(S) as needed. Indications: Opioid Overdose   Probiotic Product (PROBIOTIC PO)  Patient Yes No   Sig: Take 1 Capsule by mouth every day.   amoxicillin-clavulanate (AUGMENTIN) 875-125 MG Tab   No No   Sig: Take 1 Tablet by mouth 2 times a day.   ciprofloxacin (CIPRO) 500 MG Tab  Patient Yes No   Sig: Take 500 mg by mouth 2 times a day. 14 day course   methylPREDNISolone (MEDROL DOSEPAK) 4 MG Tablet Therapy Pack   No No   Sig: As directed on the packaging label.   metoprolol tartrate (LOPRESSOR) 50 MG Tab   No No   Sig: TAKE 1 TABLET BY MOUTH TWICE DAILY, HOLD IF BLOOD " PRESSURE LESS THAN 95/50   nystatin (MYCOSTATIN) 257756 UNIT/ML Suspension   No No   Sig: Take 5 mL by mouth 4 times a day.   ondansetron (ZOFRAN ODT) 4 MG TABLET DISPERSIBLE   No No   Sig: DISSOLVE ONE TO TWO TABLETS BY MOUTH EVERY 8 HOURS AS NEEDED FOR NAUSEA   ondansetron (ZOFRAN ODT) 4 MG TABLET DISPERSIBLE   No No   Sig: Take 1 Tablet by mouth every 6 hours as needed for Nausea.   oxyCODONE immediate release (ROXICODONE) 10 MG immediate release tablet  Patient Yes No   Sig: Take 10 mg by mouth 4 times a day. Indications: Chronic Pain   potassium chloride ER (KLOR-CON) 10 MEQ tablet  Patient Yes No   Sig: Take 10 mEq by mouth 1 time a day as needed (with Torsemide).   prochlorperazine (COMPAZINE) 10 MG Tab  Patient No No   Sig: Take 1 Tablet by mouth 1 time a day as needed.   spironolactone (ALDACTONE) 25 MG Tab   No No   Sig: Take 2 Tablets by mouth every day.   torsemide (DEMADEX) 10 MG tablet  Patient Yes No   Sig: Take 10-20 mg by mouth 1 time a day as needed (by weight).   traZODone (DESYREL) 50 MG Tab   No No   Sig: Take 1 Tablet by mouth every evening.      Facility-Administered Medications: None       Physical Exam  Temp:  [37.2 °C (98.9 °F)] 37.2 °C (98.9 °F)  Pulse:  [102-146] 102  Resp:  [16-46] 16  BP: (137-177)/(59-89) 153/78  SpO2:  [93 %-98 %] 95 %  Blood Pressure : (!) 153/78   Temperature: 37.2 °C (98.9 °F)   Pulse: (!) 102   Respiration: 16   Pulse Oximetry: 95 %       Physical Exam  Vitals and nursing note reviewed.   Constitutional:       General: She is not in acute distress.     Appearance: She is well-developed. She is not diaphoretic.   HENT:      Head: Normocephalic and atraumatic.      Right Ear: External ear normal.      Left Ear: External ear normal.      Nose: Nose normal. No congestion or rhinorrhea.      Mouth/Throat:      Mouth: Mucous membranes are dry.      Pharynx: No oropharyngeal exudate.   Eyes:      General:         Right eye: No discharge.         Left eye: No discharge.    Neck:      Trachea: No tracheal deviation.   Cardiovascular:      Rate and Rhythm: Normal rate and regular rhythm.   Pulmonary:      Effort: Pulmonary effort is normal. No respiratory distress.   Abdominal:      General: There is no distension.      Palpations: Abdomen is soft.   Musculoskeletal:      Cervical back: Neck supple.      Right lower leg: No edema.      Left lower leg: No edema.   Lymphadenopathy:      Cervical: No cervical adenopathy.   Skin:     General: Skin is warm and dry.      Findings: No erythema or rash.   Neurological:      Comments: Somnolent, nonverbal    Psychiatric:         Speech: She is noncommunicative.         Laboratory:  Recent Labs     05/20/22  0020 05/20/22  2225   WBC 13.3* 13.5*   RBC 4.42 4.29   HEMOGLOBIN 13.7 13.4   HEMATOCRIT 41.0 40.3   MCV 92.8 93.9   MCH 31.0 31.2   MCHC 33.4* 33.3*   RDW 40.3 41.7   PLATELETCT 248 261   MPV 9.9 9.9     Recent Labs     05/20/22  0020 05/20/22  2225   SODIUM 137 139   POTASSIUM 4.0 3.5*   CHLORIDE 101 101   CO2 18* 17*   GLUCOSE 105* 86   BUN 25* 17   CREATININE 1.51* 1.17   CALCIUM 10.0 9.8     Recent Labs     05/20/22  0020 05/20/22  2225   ALTSGPT 34 30   ASTSGOT 28 28   ALKPHOSPHAT 128* 121*   TBILIRUBIN 0.7 0.7   LIPASE 13  --    GLUCOSE 105* 86         No results for input(s): NTPROBNP in the last 72 hours.      Recent Labs     05/20/22  2225   TROPONINT 14       Imaging:  DX-CHEST-PORTABLE (1 VIEW)   Final Result         1.  No acute cardiopulmonary disease.   2.  Atherosclerosis          X-Ray:  I have personally reviewed the images and compared with prior images.    Assessment/Plan:  Justification for Admission Status  I anticipate this patient will require at least two midnights for appropriate medical management, necessitating inpatient admission because Steroid-induced psychosis    * Steroid-induced psychosis with complication (HCC)- (present on admission)  Assessment & Plan  - Patient does have a history of this with previous  use of steroids  -Patient has not slept in 4 days per family, this is also leading to her altered mentation  -Causing decreased oral intake leading to significant dehydration and lactic acidosis  -Causing SIRS criteria  -Patient is now somnolent after Ativan, continue as needed  -She does have a listed allergy to Ativan however it is currently working well so this will be continued  -I do feel she needs to be admitted, she was sent home, she would continue to worsen and risk injury  -I do anticipate her needing more than 2-day hospital stay    FLORES (acute kidney injury) (HCC)- (present on admission)  Assessment & Plan  - Due to dehydration  -start IV fluids  -Repeat BMP in the morning    Essential hypertension- (present on admission)  Assessment & Plan  - Continue home metoprolol  -Start as needed hydralazine  -Adjust as needed    High anion gap metabolic acidosis- (present on admission)  Assessment & Plan  - Significant lactic acidosis from dehydration  -She does meet SIRS criteria but I do not think this is due to sepsis, I think her changes are from dehydration and steroid-induced psychosis  -Start IV fluids  -Since she has not septic, I am not going to trend lactic acid level, will repeat in the morning to ensure improvement but monitor more with anion gap    SIRS (systemic inflammatory response syndrome) (HCC)- (present on admission)  Assessment & Plan  -SIRS criteria identified on my evaluation include:  Tachycardia, with heart rate greater than 90 BPM, Tachypnea, with respirations greater than 20 per minute and Leukocytosis, with WBC greater than 12,000  SIRS is non-infectious, the patient does not have sepsis  -Patient was recently on steroids, demargination explaining her leukocytosis, psychosis explaining tachycardia and tachypnea  -Patient was just on antibiotics, none will be restarted at this time  -However, continue closely monitoring for additional sign of infection considering I am unable to speak to  the patient at this time to evaluate complaints    Hypokalemia- (present on admission)  Assessment & Plan  - Place IV potassium and with IV fluids  -Repeat BMP in the morning    Paroxysmal atrial fibrillation (HCC)- (present on admission)  Assessment & Plan  - Sinus, continue home metoprolol  -Not on chronic anticoagulation and full dose anticoagulation will not be started at this time    COPD (chronic obstructive pulmonary disease) ([facility])- (present on admission)  Assessment & Plan  - No acute exacerbation    Crohn's disease of small intestine with complication (HCC)- (present on admission)  Assessment & Plan  - Recent flare, was on steroids and antibiotics  -Soft abdomen, was having no additional complaints as far as I know  -Reevaluate with improved mentation      VTE prophylaxis: SCDs/TEDs and Xarelto 10 mg daily as prophylaxis

## 2022-05-21 NOTE — CARE PLAN
The patient is Stable - Low risk of patient condition declining or worsening         Progress made toward(s) clinical / shift goals:    Problem: Knowledge Deficit - Standard  Goal: Patient and family/care givers will demonstrate understanding of plan of care, disease process/condition, diagnostic tests and medications  Outcome: Not Progressing     Problem: Pain - Standard  Goal: Alleviation of pain or a reduction in pain to the patient’s comfort goal  Outcome: Progressing     Problem: Fall Risk  Goal: Patient will remain free from falls  Outcome: Progressing       Patient is not progressing towards the following goals:      Problem: Knowledge Deficit - Standard  Goal: Patient and family/care givers will demonstrate understanding of plan of care, disease process/condition, diagnostic tests and medications  Outcome: Not Progressing

## 2022-05-21 NOTE — ASSESSMENT & PLAN NOTE
Patient does have a history of this with previous use of steroids  Patient has not slept for 4 days per family, this is also leading to her altered mentation

## 2022-05-21 NOTE — ED TRIAGE NOTES
Pt comes in c/o continued nausea and vomiting and possible dehydration   States she was seen here this morning for same (noted in chart she was seen 5/19/2022)   dropped pt off  Pt rambling about a great many things however easily redirected to triage evaluation  In WC in NAD

## 2022-05-21 NOTE — ED PROVIDER NOTES
ED Provider Note  ED Provider Note    Scribed for Bennett Maya by Bennett Maya. 2022  10:24 PM    Primary care provider: Chase Charles D.O.  Means of arrival: Private vehicle with   History obtained from: Patient  History limited by: None    CHIEF COMPLAINT  Chief Complaint   Patient presents with   • Abdominal Pain     Chrons flare up  was seen here this morning for same  this flare up started on Wednesday  not having pain at this time   having N/V    • N/V     On going since being seen today    • Dehydration     Has not been able to tolerate PO fluid or eat for the last couple of days      HPI  Jana Overton is a 66 y.o. 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  She was seen here by myself last evening for persistent nausea and vomiting and dehydration.  She given IV fluids and Ativan.  She arrives with pressured speech, mild to moderate psychosis likely secondary to sleep titration and recent steroid use for Crohn's flare.  She has very pressured speech, mildly tangential thought process but was deemed to have decision-making opacity after discussion with her and her .  She had to leave this morning and declined inpatient mission as she had to go to a  today but did return this evening for reevaluation and presumed admission.  She reports persistent nausea and vomiting throughout the day which is unchanged.  She still has pressured speech, tangential thought process and mild to moderate altered mental status but is alert and oriented x3.  Quality: Nausea vomiting  Duration: 6 days  Severity: Moderate  Associated sx: Steroid-induced psychosis    Her lab evaluation from last evening showed a mild leukocytosis of 13, bicarbonate of 18, FLORES with creatinine 1.5, lactic acid of 2.3 and hypophosphatemia.  Drug screen positive for cannabinoid and  oxycodone which the patient takes.  Alcohol was negative at that time.    REVIEW OF SYSTEMS  As above, all other systems reviewed and are negative.   See HPI for further details.     PAST MEDICAL HISTORY   has a past medical history of Anesthesia, Anxiety, Arthritis, ASTHMA, Atrial fib/flut, Backpain, Bronchitis, Chronic pain, Colostomy in place ([facility]) (10/14/2010), COPD (chronic obstructive pulmonary disease) ([facility]) (6/24/2014), COPD (chronic obstructive pulmonary disease) ([facility]) (6/24/2014), Crohn's disease of colon ([facility]), Dyspnea, History of cardiac murmur, Hypokalemia (6/24/2014), Ileostomy present ([facility]), Infectious disease, Multiple falls (6/24/2014), Narcotic dependence ([facility]) (9/23/2009), Obstruction, Other specified disorder of intestines, Pain (7/11/13), Pneumonia, Postherpetic neuralgia (6/24/2014), Rosacea (6/24/2014), and Sleep apnea.  SURGICAL HISTORY   has a past surgical history that includes lumbar fusion anterior; cervical disk and fusion anterior; foot surgery; hand surgery; other abdominal surgery; gastroscopy with biopsy (11/22/08); ileostomy (11/11/2009); dilation and curettage (9/24/2010); gastroscopy (10/4/2011); colonoscopy (10/4/2011); hardware removal neuro (7/16/2012); mammoplasty reduction (7/17/2013); ileostomy (5/14/2014); breast reduction; abdominal exploration; lumpectomy; colon resection; ileostomy (12/29/2018); sigmoidoscopy flex (12/29/2018); exploratory laparotomy (12/29/2018); gastroscopy (1/6/2019); gastroscopy (N/A, 5/22/2019); ileostomy (1/20/2021); lysis adhesions general (1/20/2021); cysto/uretero/pyeloscopy, dx (Right, 12/8/2021); cysto stent placemnt pre surg (Right, 12/8/2021); cystoscopy,insert ureteral stent (Right, 12/23/2021); and cysto/uretero/pyeloscopy, dx (Right, 12/23/2021).  SOCIAL HISTORY  Social History     Tobacco Use   • Smoking status: Never Smoker   • Smokeless tobacco: Never Used   • Tobacco comment: second hand smoke parents - smoked for only 1 year many  years ago   Vaping Use   • Vaping Use: Never used   Substance Use Topics   • Alcohol use: Not Currently     Alcohol/week: 0.6 oz     Types: 1 Shots of liquor per week     Comment: gin and half a lime; tonic water   • Drug use: Not Currently     Types: Marijuana, Inhaled     Comment: medical marijuana through bong      Social History     Substance and Sexual Activity   Drug Use Not Currently   • Types: Marijuana, Inhaled    Comment: medical marijuana through bong     FAMILY HISTORY  Family History   Problem Relation Age of Onset   • Lung Disease Mother         copd   • Diabetes Father    • Heart Disease Father    • Diabetes Sister    • Cancer Maternal Aunt 40        breast     CURRENT MEDICATIONS  Home Medications     Reviewed by Kacie Hoover R.N. (Registered Nurse) on 05/20/22 at 2142  Med List Status: Partial   Medication Last Dose Status   amoxicillin-clavulanate (AUGMENTIN) 875-125 MG Tab  Active   ciprofloxacin (CIPRO) 500 MG Tab  Active   EPINEPHrine (EPIPEN) 0.3 MG/0.3ML Solution Auto-injector solution for injection  Active   methylPREDNISolone (MEDROL DOSEPAK) 4 MG Tablet Therapy Pack  Active   metoprolol tartrate (LOPRESSOR) 50 MG Tab  Active   Naloxone (NARCAN) 4 MG/0.1ML Liquid  Active   nystatin (MYCOSTATIN) 443495 UNIT/ML Suspension  Active   ondansetron (ZOFRAN ODT) 4 MG TABLET DISPERSIBLE  Active   ondansetron (ZOFRAN ODT) 4 MG TABLET DISPERSIBLE  Active   oxyCODONE immediate release (ROXICODONE) 10 MG immediate release tablet  Active   potassium chloride ER (KLOR-CON) 10 MEQ tablet  Active   Probiotic Product (PROBIOTIC PO)  Active   prochlorperazine (COMPAZINE) 10 MG Tab  Active   spironolactone (ALDACTONE) 25 MG Tab  Active   torsemide (DEMADEX) 10 MG tablet  Active   traZODone (DESYREL) 50 MG Tab  Active              ALLERGIES  Allergies   Allergen Reactions   • Azathioprine Sodium Hives and Shortness of Breath   • Infliximab Hives, Shortness of Breath and Rash     .   • Methotrexate Hives,  "Shortness of Breath and Rash     .   • Methotrexate Hives, Rash and Shortness of Breath     .   • Morphine Shortness of Breath, Swelling and Palpitations   • Promethazine Shortness of Breath and Rash     .   • Roxanol [Morphine Sulfate] Swelling     Throat swells   • Amitriptyline Unspecified     Nightmares     • Carafate [Sucralfate] Vomiting and Nausea     Generalizes/Mouth Sores   • Demerol Vomiting   • Duragesic [Fentanyl]      \"Patches didn't work\"  Skin broke down   • Fentanyl Unspecified     Patches caused skin breakdown, no pain relief   • Lorazepam Unspecified     States give me nightmares.    • Meperidine Vomiting   • Methadone Unspecified     Didn't work   • Methadone Unspecified     Didn't work   • Other Drug Unspecified     steroids   • Pregabalin Rash     Rash     • Pseudoephedrine Vomiting   • Sulfa Drugs Hives and Rash     .  .   • Betamethasone Unspecified     Pt unable to remember   • Celestone [Betamethasone Sodium Phosphate] Unspecified     Pt unable to remember   • Sulfasalazine Rash     On chest       PHYSICAL EXAM    VITAL SIGNS:   Vitals:    05/20/22 2131 05/20/22 2139 05/20/22 2224 05/20/22 2258   BP:  137/64 (!) 163/75    Pulse:  (!) 125 (!) 131 (!) 116   Resp:  20 (!) 46    Temp:  37.2 °C (98.9 °F)     TempSrc:  Temporal     SpO2:  96% 98%    Weight: 78.5 kg (173 lb)      Height: 1.829 m (6')          Vitals: My interpretation: normotensive, tachycardic, afebrile, not hypoxic    Reinterpretation of vitals: improving     Cardiac Monitor Interpretation: The cardiac monitor revealed normal Sinus Rhythm with tachycardia as interpreted by me. The cardiac monitor was ordered secondary to the patient's history of dehydration and to monitor for dysrhythmia and/or tachycardia.    PE:   Constitutional: Well developed, Well nourished, No acute distress, Non-toxic appearance.   HENT: Normocephalic, Atraumatic, Bilateral external ears normal, Oropharynx is clear mucous membranes are moist. No oral " exudates or nasal discharge.   Eyes: Pupils are equal round and reactive, EOMI, Conjunctiva normal, No discharge.   Neck: Normal range of motion, No tenderness, Supple, No stridor. No meningismus.  Lymphatic: No lymphadenopathy noted.   Cardiovascular: Regular rate and rhythm without murmur rub or gallop.  Thorax & Lungs: Clear breath sounds bilaterally without wheezes, rhonchi or rales. There is no chest wall tenderness.   Abdomen: Soft non-tender non-distended. There is no rebound or guarding. No organomegaly is appreciated. Bowel sounds are normal.  Skin: Normal without rash.   Back: No CVA or spinal tenderness.   Extremities: Intact distal pulses, No edema, No tenderness, No cyanosis, No clubbing. Capillary refill is less than 2 seconds.  Musculoskeletal: Good range of motion in all major joints. No tenderness to palpation or major deformities noted.   Neurologic: Alert & oriented x 3, Normal motor function, Normal sensory function, No focal deficits noted. Reflexes are normal.  Psychiatric: Pressured speech, manic behavior, tangential thought process no obvious hallucinations    DIAGNOSTIC STUDIES / PROCEDURES    LABS  Results for orders placed or performed during the hospital encounter of 05/20/22   CBC WITH DIFFERENTIAL   Result Value Ref Range    WBC 13.5 (H) 4.8 - 10.8 K/uL    RBC 4.29 4.20 - 5.40 M/uL    Hemoglobin 13.4 12.0 - 16.0 g/dL    Hematocrit 40.3 37.0 - 47.0 %    MCV 93.9 81.4 - 97.8 fL    MCH 31.2 27.0 - 33.0 pg    MCHC 33.3 (L) 33.6 - 35.0 g/dL    RDW 41.7 35.9 - 50.0 fL    Platelet Count 261 164 - 446 K/uL    MPV 9.9 9.0 - 12.9 fL    Neutrophils-Polys 75.00 (H) 44.00 - 72.00 %    Lymphocytes 16.00 (L) 22.00 - 41.00 %    Monocytes 7.50 0.00 - 13.40 %    Eosinophils 0.40 0.00 - 6.90 %    Basophils 0.50 0.00 - 1.80 %    Immature Granulocytes 0.60 0.00 - 0.90 %    Nucleated RBC 0.00 /100 WBC    Neutrophils (Absolute) 10.13 (H) 2.00 - 7.15 K/uL    Lymphs (Absolute) 2.16 1.00 - 4.80 K/uL    Monos  (Absolute) 1.01 (H) 0.00 - 0.85 K/uL    Eos (Absolute) 0.05 0.00 - 0.51 K/uL    Baso (Absolute) 0.07 0.00 - 0.12 K/uL    Immature Granulocytes (abs) 0.08 0.00 - 0.11 K/uL    NRBC (Absolute) 0.00 K/uL   COMP METABOLIC PANEL   Result Value Ref Range    Sodium 139 135 - 145 mmol/L    Potassium 3.5 (L) 3.6 - 5.5 mmol/L    Chloride 101 96 - 112 mmol/L    Co2 17 (L) 20 - 33 mmol/L    Anion Gap 21.0 (H) 7.0 - 16.0    Glucose 86 65 - 99 mg/dL    Bun 17 8 - 22 mg/dL    Creatinine 1.17 0.50 - 1.40 mg/dL    Calcium 9.8 8.4 - 10.2 mg/dL    AST(SGOT) 28 12 - 45 U/L    ALT(SGPT) 30 2 - 50 U/L    Alkaline Phosphatase 121 (H) 30 - 99 U/L    Total Bilirubin 0.7 0.1 - 1.5 mg/dL    Albumin 4.6 3.2 - 4.9 g/dL    Total Protein 8.2 6.0 - 8.2 g/dL    Globulin 3.6 (H) 1.9 - 3.5 g/dL    A-G Ratio 1.3 g/dL   TROPONIN   Result Value Ref Range    Troponin T 14 6 - 19 ng/L   LACTIC ACID   Result Value Ref Range    Lactic Acid 3.5 (H) 0.5 - 2.0 mmol/L   ESTIMATED GFR   Result Value Ref Range    GFR (CKD-EPI) 51 (A) >60 mL/min/1.73 m 2   EKG (NOW)   Result Value Ref Range    Report       [facility]    Test Date:  2022  Pt Name:    YONATHAN JUDD                Department: [facility]  MRN:        [id number]                      Room:       -ROOM 3  Gender:     Female                       Technician: YUNIER  :        [id number]                   Requested By:NOÉ WEEKS  Order #:    [id number]                    Reading MD: Noé Weeks    Measurements  Intervals                                Axis  Rate:       131                          P:          70  NM:         133                          QRS:        78  QRSD:       86                           T:          -71  QT:         306  QTc:        452    Interpretive Statements  Sinus tachycardia  RSR' in V1 or V2, right VCD or RVH  Compared to ECG 2021 13:31:26  Sinus rhythm no longer present  Electronically Signed On 2022 22:34:43 PDT  by Bennett Maya       *Note: Due to a large number of results and/or encounters for the requested time period, some results have not been displayed. A complete set of results can be found in Results Review.      All labs reviewed by me. Significant for leukocytosis of 13, no significant anemia, normal electrolytes other than a bicarbonate of 17, anion gap acidosis of 21, creatinine has improved from 1.5-1.17, normal liver enzymes, normal bilirubin, troponin negative, lactic acid of 3.5    RADIOLOGY  DX-CHEST-PORTABLE (1 VIEW)   Final Result         1.  No acute cardiopulmonary disease.   2.  Atherosclerosis        The radiologist's interpretation of all radiological studies have been reviewed by me.    COURSE & MEDICAL DECISION MAKING  Nursing notes, VS, PMSFHx, labs, imaging, EKG reviewed in chart.    Heart Score: Moderate    MDM: 10:24 PM Jana Overton is a 66 y.o. female who presented with presumed steroid induced psychosis with sleep deprivation after taking steroids for the last 7 days.  She was seen last night by myself and observed for 7 hours and declined admission for persistent tachycardia, dehydration, lactic acidosis, leukocytosis.  History of multiple autoimmune diseases and going through a current Crohn's flare with intractable nausea vomiting likely contributing to her dehydrated status.  She came back today for reevaluation and presumed admission.  She reports continued nausea vomiting throughout the day.  On exam she still has signs and symptoms consistent with psychosis including pressured speech, tangential thought process and affect consistent with kely. She was given a single dose of IV ativan to help calm her.   She is still tachycardic but hemodynamically stable and afebrile and was started on IV fluids.  EKG shows sinus tachycardia with very slight ST depression but patient denies chest pain. CXR WNLs.   Labs show worsening lactic acidosis, mild leukocytosis which is unchanged from  prior, her creatinine has improved and the rest of her labs are fairly unremarkable other than a bicarb of 17 demonstrating dehydration.  Considering the patient's ongoing kely/psychosis secondary to steroid use, and worsening dehydration with lactic acidosis, will admit for further monitoring to the hospitalist team.  Discussed pros and cons of antibiotics and considering patient has no source of infection, clean urinalysis from yesterday, afebrile, we think likely her lab derangements are driven by her dehydration and psychosis and failure to self-care and will hold off with empiric antibiotics unless patient has a positive blood culture or urine culture or has sign of infection.  Patient updated and is amenable.    CRITICAL CARE TIME 36 minutes: Severe altered mental status, acute psychosis requiring Ativan, severe dehydration requiring 2 L of IV fluids for tachycardia of 125, lactic acidosis of 3.5  There was a very real possibility of deterioration of the patient's condition.  This patient required the highest level of care.  I provided critical care services which included: review of the medical record, treatment orders, ordering and reviewing test results, frequent reevaluation of the patient's condition and response to treatment, as well as discussing the case with appropriate personnel and various consultants. The critical care time associated with the care of this patient is exclusive of any procedures or specific interventions.    HYDRATION: Based on the patient's presentation of Acute Vomiting, Dehydration and Inability to take oral fluids the patient was given IV fluids. IV Hydration was used because oral hydration was not adequate alone. Upon recheck following hydration, the patient was imroved.    FINAL IMPRESSION  1. Dehydration Acute   2. Steroid-induced psychosis with complication (HCC) Acute      The note accurately reflects work and decisions made by me.  Bennett Maya  5/20/2022  10:24  PM

## 2022-05-21 NOTE — PROGRESS NOTES
Hospital Medicine Daily Progress Note      Date of Service  5/21/2022    Chief Complaint  Abdominal pain, nausea and vomiting    Hospital Course  Jana Overton is a 66 y.o. female with a past medical history of hypertension, chronic pulmonary disease, Crohn's disease admitted 5/20/2022 with dehydration and acute kidney injury.  Patient started on intravenous fluids, antiemetics.  Creatinine was elevated at 1.51, improved with rehydration.  She had hypokalemia that was replaced.  She had lactic acidosis of 3.5, improved to 2 with rehydration.    Interval Problem Update  Still dehydrated, continue intravenous fluids.  You  Creatinine was elevated at 1.51, improved with rehydration.    She has hypokalemia that will be replaced.  Abdominal pain, slightly better. Multimodal pain control     I have personally seen and examined the patient at bedside. I discussed the plan of care with patient.    Consultants/Specialty  None     Code Status  Full Code    Disposition  Patient is not medically cleared for discharge.   Anticipate discharge to to home with close outpatient follow-up.     Review of Systems  Review of Systems   Constitutional: Positive for malaise/fatigue. Negative for chills and fever.   Eyes: Negative for discharge and redness.   Respiratory: Negative for cough, shortness of breath and stridor.    Cardiovascular: Negative for chest pain and leg swelling.   Gastrointestinal: Negative for abdominal pain and vomiting.   Genitourinary: Negative for flank pain.   Musculoskeletal: Negative for myalgias.   Skin: Negative.    Neurological: Negative for focal weakness.   Endo/Heme/Allergies: Does not bruise/bleed easily.   Psychiatric/Behavioral: The patient is nervous/anxious.       Physical Exam  Temp:  [36.5 °C (97.7 °F)-37.2 °C (98.9 °F)] 36.7 °C (98 °F)  Pulse:  [] 100  Resp:  [16-46] 18  BP: (137-169)/() 169/75  SpO2:  [95 %-99 %] 98 %    Physical Exam  Constitutional:       General: She is not in  acute distress.  HENT:      Head: Normocephalic and atraumatic.      Right Ear: External ear normal.      Left Ear: External ear normal.      Nose: No congestion or rhinorrhea.      Mouth/Throat:      Mouth: Mucous membranes are moist.      Pharynx: No oropharyngeal exudate or posterior oropharyngeal erythema.   Eyes:      General: No scleral icterus.        Right eye: No discharge.         Left eye: No discharge.      Conjunctiva/sclera: Conjunctivae normal.      Pupils: Pupils are equal, round, and reactive to light.   Cardiovascular:      Rate and Rhythm: Tachycardia present.      Heart sounds:     No friction rub. No gallop.   Pulmonary:      Effort: Pulmonary effort is normal.   Abdominal:      General: Abdomen is flat. There is no distension.      Tenderness: There is no guarding.   Musculoskeletal:         General: No swelling.      Cervical back: Neck supple. No rigidity. No muscular tenderness.      Right lower leg: No edema.      Left lower leg: No edema.   Skin:     Capillary Refill: Capillary refill takes 2 to 3 seconds.      Coloration: Skin is not jaundiced or pale.      Findings: No bruising or erythema.   Neurological:      Mental Status: She is alert and oriented to person, place, and time.   Psychiatric:         Mood and Affect: Mood normal.         Judgment: Judgment normal.       Fluids    Intake/Output Summary (Last 24 hours) at 5/21/2022 1141  Last data filed at 5/21/2022 0111  Gross per 24 hour   Intake 2000 ml   Output --   Net 2000 ml     Laboratory  Recent Labs     05/20/22  0020 05/20/22  2225   WBC 13.3* 13.5*   RBC 4.42 4.29   HEMOGLOBIN 13.7 13.4   HEMATOCRIT 41.0 40.3   MCV 92.8 93.9   MCH 31.0 31.2   MCHC 33.4* 33.3*   RDW 40.3 41.7   PLATELETCT 248 261   MPV 9.9 9.9     Recent Labs     05/20/22  0020 05/20/22  2225   SODIUM 137 139   POTASSIUM 4.0 3.5*   CHLORIDE 101 101   CO2 18* 17*   GLUCOSE 105* 86   BUN 25* 17   CREATININE 1.51* 1.17   CALCIUM 10.0 9.8                    Imaging  DX-CHEST-PORTABLE (1 VIEW)   Final Result         1.  No acute cardiopulmonary disease.   2.  Atherosclerosis         Assessment/Plan  * Steroid-induced psychosis with complication (HCC)- (present on admission)  Assessment & Plan  Patient does have a history of this with previous use of steroids  Patient has not slept for 4 days per family, this is also leading to her altered mentation     Acute encephalopathy- (present on admission)  Assessment & Plan  Multifactorial, toxic metabolic likely secondary to dehydration, steroids.   Anticipate improvement with treating dehydration and steroids clearance.   Consider further diagnostic testing/imaging if encephalopathy does not improve with above measures.     FLORES (acute kidney injury) (HCC)- (present on admission)  Assessment & Plan  Due to dehydration  Continue IV fluids    High anion gap metabolic acidosis- (present on admission)  Assessment & Plan  Lactic acidosis from dehydration  Start IV fluids     SIRS (systemic inflammatory response syndrome) ([facility])- (present on admission)  Assessment & Plan  Was present on admission, now resolved      Essential hypertension- (present on admission)  Assessment & Plan  Resume home metoprolol  As needed hydralazine     Hypokalemia- (present on admission)  Assessment & Plan  Replacing, checking magnesium    Continue to monitor replace as needed    Paroxysmal atrial fibrillation (HCC)- (present on admission)  Assessment & Plan  Sinus, continue home metoprolol  Not on chronic anticoagulation      COPD (chronic obstructive pulmonary disease) ([facility])- (present on admission)  Assessment & Plan  Not currently having an exacerbation  Oxygen as needed, Respiratory protocol, Bronchodilators, Incentive spirometry     Crohn's disease of small intestine with complication (HCC)- (present on admission)  Assessment & Plan  Recent flare, was on steroids and antibiotics  Soft abdomen pain slightly better now.  Multimodal pain control         VTE prophylaxis: SCDs/TEDs and Xarelto 10 mg daily as prophylaxis

## 2022-05-21 NOTE — ED NOTES
Pt is sleeping but wakes easily with verbal and with taking VS. Pt denies needs. Pt denies pain at this time. Pt appears less anxious.

## 2022-05-21 NOTE — ASSESSMENT & PLAN NOTE
Multifactorial, toxic metabolic likely secondary to dehydration, steroids.   Anticipate improvement with treating dehydration and steroids clearance.   Consider further diagnostic testing/imaging if encephalopathy does not improve with above measures.

## 2022-05-21 NOTE — CARE PLAN
The patient is Stable - Low risk of patient condition declining or worsening    Shift Goals  Clinical Goals: Manage dehydration and reverse psychosis.  Patient Goals: Walk around    Progress made toward(s) clinical / shift goals:    Problem: Knowledge Deficit - Standard  Goal: Patient and family/care givers will demonstrate understanding of plan of care, disease process/condition, diagnostic tests and medications  Outcome: Not Progressing  Note: Pt needs reinforcement/reorientation. Pt states that she is here for a Crohn's flare up.        Patient is not progressing towards the following goals:      Problem: Knowledge Deficit - Standard  Goal: Patient and family/care givers will demonstrate understanding of plan of care, disease process/condition, diagnostic tests and medications  Outcome: Not Progressing  Note: Pt needs reinforcement/reorientation. Pt states that she is here for a Crohn's flare up.

## 2022-05-21 NOTE — ASSESSMENT & PLAN NOTE
Not currently having an exacerbation  Oxygen as needed, Respiratory protocol, Bronchodilators, Incentive spirometry

## 2022-05-22 VITALS
SYSTOLIC BLOOD PRESSURE: 148 MMHG | DIASTOLIC BLOOD PRESSURE: 90 MMHG | RESPIRATION RATE: 18 BRPM | HEIGHT: 72 IN | WEIGHT: 160.05 LBS | BODY MASS INDEX: 21.68 KG/M2 | HEART RATE: 99 BPM | OXYGEN SATURATION: 96 % | TEMPERATURE: 98.3 F

## 2022-05-22 LAB
ANION GAP SERPL CALC-SCNC: 11 MMOL/L (ref 7–16)
BUN SERPL-MCNC: 8 MG/DL (ref 8–22)
CALCIUM SERPL-MCNC: 9.1 MG/DL (ref 8.4–10.2)
CHLORIDE SERPL-SCNC: 111 MMOL/L (ref 96–112)
CO2 SERPL-SCNC: 21 MMOL/L (ref 20–33)
CREAT SERPL-MCNC: 0.98 MG/DL (ref 0.5–1.4)
ERYTHROCYTE [DISTWIDTH] IN BLOOD BY AUTOMATED COUNT: 43 FL (ref 35.9–50)
GFR SERPLBLD CREATININE-BSD FMLA CKD-EPI: 63 ML/MIN/1.73 M 2
GLUCOSE SERPL-MCNC: 108 MG/DL (ref 65–99)
HCT VFR BLD AUTO: 38 % (ref 37–47)
HGB BLD-MCNC: 12.4 G/DL (ref 12–16)
MAGNESIUM SERPL-MCNC: 1.7 MG/DL (ref 1.5–2.5)
MCH RBC QN AUTO: 31 PG (ref 27–33)
MCHC RBC AUTO-ENTMCNC: 32.6 G/DL (ref 33.6–35)
MCV RBC AUTO: 95 FL (ref 81.4–97.8)
PLATELET # BLD AUTO: 244 K/UL (ref 164–446)
PMV BLD AUTO: 10.1 FL (ref 9–12.9)
POTASSIUM SERPL-SCNC: 3.4 MMOL/L (ref 3.6–5.5)
RBC # BLD AUTO: 4 M/UL (ref 4.2–5.4)
SODIUM SERPL-SCNC: 143 MMOL/L (ref 135–145)
WBC # BLD AUTO: 8.8 K/UL (ref 4.8–10.8)

## 2022-05-22 PROCEDURE — 700101 HCHG RX REV CODE 250: Performed by: INTERNAL MEDICINE

## 2022-05-22 PROCEDURE — 85027 COMPLETE CBC AUTOMATED: CPT

## 2022-05-22 PROCEDURE — 99239 HOSP IP/OBS DSCHRG MGMT >30: CPT | Performed by: INTERNAL MEDICINE

## 2022-05-22 PROCEDURE — 80048 BASIC METABOLIC PNL TOTAL CA: CPT

## 2022-05-22 PROCEDURE — 83735 ASSAY OF MAGNESIUM: CPT

## 2022-05-22 PROCEDURE — A9270 NON-COVERED ITEM OR SERVICE: HCPCS | Performed by: INTERNAL MEDICINE

## 2022-05-22 PROCEDURE — 700102 HCHG RX REV CODE 250 W/ 637 OVERRIDE(OP): Performed by: INTERNAL MEDICINE

## 2022-05-22 RX ORDER — TRAZODONE HYDROCHLORIDE 50 MG/1
50 TABLET ORAL ONCE
COMMUNITY
End: 2022-06-07

## 2022-05-22 RX ORDER — METHYLPREDNISOLONE 4 MG/1
4 TABLET ORAL DAILY
Status: ON HOLD | COMMUNITY
End: 2022-05-24

## 2022-05-22 RX ADMIN — OXYCODONE HYDROCHLORIDE 10 MG: 10 TABLET ORAL at 08:47

## 2022-05-22 RX ADMIN — DIAZEPAM 2 MG: 2 TABLET ORAL at 01:35

## 2022-05-22 RX ADMIN — SODIUM CHLORIDE AND POTASSIUM CHLORIDE: 9; 1.49 INJECTION, SOLUTION INTRAVENOUS at 01:55

## 2022-05-22 RX ADMIN — SODIUM CHLORIDE AND POTASSIUM CHLORIDE: 9; 1.49 INJECTION, SOLUTION INTRAVENOUS at 08:47

## 2022-05-22 ASSESSMENT — PAIN DESCRIPTION - PAIN TYPE: TYPE: ACUTE PAIN

## 2022-05-22 NOTE — DISCHARGE INSTRUCTIONS
Dehydration, Adult    Dehydration is when there is not enough fluid or water in your body. This happens when you lose more fluids than you take in. Dehydration can range from mild to very bad. It should be treated right away to keep it from getting very bad.  Symptoms of mild dehydration may include:  Thirst.  Dry lips.  Slightly dry mouth.  Dry, warm skin.  Dizziness.  Symptoms of moderate dehydration may include:  Very dry mouth.  Muscle cramps.  Dark pee (urine). Pee may be the color of tea.  Your body making less pee.  Your eyes making fewer tears.  Heartbeat that is uneven or faster than normal (palpitations).  Headache.  Light-headedness, especially when you stand up from sitting.  Fainting (syncope).  Symptoms of very bad dehydration may include:  Changes in skin, such as:  Cold and clammy skin.  Blotchy (mottled) or pale skin.  Skin that does not quickly return to normal after being lightly pinched and let go (poor skin turgor).  Changes in body fluids, such as:  Feeling very thirsty.  Your eyes making fewer tears.  Not sweating when body temperature is high, such as in hot weather.  Your body making very little pee.  Changes in vital signs, such as:  Weak pulse.  Pulse that is more than 100 beats a minute when you are sitting still.  Fast breathing.  Low blood pressure.  Other changes, such as:  Sunken eyes.  Cold hands and feet.  Confusion.  Lack of energy (lethargy).  Trouble waking up from sleep.  Short-term weight loss.  Unconsciousness.  Follow these instructions at home:    If told by your doctor, drink an ORS:  Make an ORS by using instructions on the package.  Start by drinking small amounts, about ½ cup (120 mL) every 5-10 minutes.  Slowly drink more until you have had the amount that your doctor said to have.  Drink enough clear fluid to keep your pee clear or pale yellow. If you were told to drink an ORS, finish the ORS first, then start slowly drinking clear fluids. Drink fluids such as:  Water.  Do not drink only water by itself. Doing that can make the salt (sodium) level in your body get too low (hyponatremia).  Ice chips.  Fruit juice that you have added water to (diluted).  Low-calorie sports drinks.  Avoid:  Alcohol.  Drinks that have a lot of sugar. These include high-calorie sports drinks, fruit juice that does not have water added, and soda.  Caffeine.  Foods that are greasy or have a lot of fat or sugar.  Take over-the-counter and prescription medicines only as told by your doctor.  Do not take salt tablets. Doing that can make the salt level in your body get too high (hypernatremia).  Eat foods that have minerals (electrolytes). Examples include bananas, oranges, potatoes, tomatoes, and spinach.  Keep all follow-up visits as told by your doctor. This is important.  Contact a doctor if:  You have belly (abdominal) pain that:  Gets worse.  Stays in one area (localizes).  You have a rash.  You have a stiff neck.  You get angry or annoyed more easily than normal (irritability).  You are more sleepy than normal.  You have a harder time waking up than normal.  You feel:  Weak.  Dizzy.  Very thirsty.  You have peed (urinated) only a small amount of very dark pee during 6-8 hours.  Get help right away if:  You have symptoms of very bad dehydration.  You cannot drink fluids without throwing up (vomiting).  Your symptoms get worse with treatment.  You have a fever.  You have a very bad headache.  You are throwing up or having watery poop (diarrhea) and it:  Gets worse.  Does not go away.  You have blood or something green (bile) in your throw-up.  You have blood in your poop (stool). This may cause poop to look black and tarry.  You have not peed in 6-8 hours.  You pass out (faint).  Your heart rate when you are sitting still is more than 100 beats a minute.  You have trouble breathing.  This information is not intended to replace advice given to you by your health care provider. Make sure you discuss any  questions you have with your health care provider.  Document Released: 10/14/2010 Document Revised: 11/30/2018 Document Reviewed: 02/10/2017  [employer] Patient Education © 2020 [employer]  Discharge Instructions    Discharged to home by car with relative. Discharged via wheelchair, hospital escort: Yes.  Special equipment needed: Not Applicable    Be sure to schedule a follow-up appointment with your primary care doctor or any specialists as instructed.     Discharge Plan:        I understand that a diet low in cholesterol, fat, and sodium is recommended for good health. Unless I have been given specific instructions below for another diet, I accept this instruction as my diet prescription.   Other diet: Heart Healthy    Special Instructions: None    Is patient discharged on Warfarin / Coumadin?   No     Depression / Suicide Risk    As you are discharged from this [facility] facility, it is important to learn how to keep safe from harming yourself.    Recognize the warning signs:  Abrupt changes in personality, positive or negative- including increase in energy   Giving away possessions  Change in eating patterns- significant weight changes-  positive or negative  Change in sleeping patterns- unable to sleep or sleeping all the time   Unwillingness or inability to communicate  Depression  Unusual sadness, discouragement and loneliness  Talk of wanting to die  Neglect of personal appearance   Rebelliousness- reckless behavior  Withdrawal from people/activities they love  Confusion- inability to concentrate     If you or a loved one observes any of these behaviors or has concerns about self-harm, here's what you can do:  Talk about it- your feelings and reasons for harming yourself  Remove any means that you might use to hurt yourself (examples: pills, rope, extension cords, firearm)  Get professional help from the community (Mental Health, Substance Abuse, psychological counseling)  Do not be alone:Call your  Safe Contact- someone whom you trust who will be there for you.  Call your local CRISIS HOTLINE 710-5993 or 758-738-8565  Call your local Children's Mobile Crisis Response Team [place] (201) 269-9445 or www.PingSome  Call the toll free National Suicide Prevention Hotlines   National Suicide Prevention Lifeline 817-740-DBBR (2412)  [place] [employer] [facility] 800-SUICIDE (444-4276)

## 2022-05-22 NOTE — CARE PLAN
The patient is Stable - Low risk of patient condition declining or worsening    Shift Goals  Clinical Goals: Pt will experience pain control by receiving pain medication  Patient Goals: Pt will be able to rest with minimal interruptions through end of shift    Progress made toward(s) clinical / shift goals:    Problem: Pain - Standard  Goal: Alleviation of pain or a reduction in pain to the patient’s comfort goal  Outcome: Progressing     Problem: Knowledge Deficit - Standard  Goal: Patient and family/care givers will demonstrate understanding of plan of care, disease process/condition, diagnostic tests and medications  Outcome: Progressing     Problem: Fall Risk  Goal: Patient will remain free from falls  Outcome: Progressing       Patient is not progressing towards the following goals:

## 2022-05-22 NOTE — DISCHARGE SUMMARY
Discharge Summary    CHIEF COMPLAINT ON ADMISSION  Chief Complaint   Patient presents with   • Abdominal Pain     Chrons flare up  was seen here this morning for same  this flare up started on Wednesday  not having pain at this time   having N/V    • N/V     On going since being seen today    • Dehydration     Has not been able to tolerate PO fluid or eat for the last couple of days        Reason for Admission  N/V; Dehydration     Admission Date  5/20/2022    CODE STATUS  Prior    HPI & HOSPITAL COURSE  Jana Overton is a 66 y.o. female who presented 5/20/2022 with altered mental status.  Patient does have a history of Crohn's disease, recently had a flare and was placed on antibiotics as well as steroids.  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  Patient has not slept in approximately 4 days and has become altered. Noted to have FLORES which improved with IV fluids. No sings of UTI.  Patient was verbally abusive and threatening towards staff during her stay, noted to be a sequelae of her steroid induced psychosis. Patients mentation did improve enough that she could be safely discharged home in the care of her . Patient was determined satisfactory for discharge with appropriate follow up.    Therefore, she is discharged in fair and stable condition to home with close outpatient follow-up.    The patient met 2-midnight criteria for an inpatient stay at the time of discharge.    Discharge Date  5/22/2022    FOLLOW UP ITEMS POST DISCHARGE  Please follow up with PCP in 3-5 days for post hospitalization follow up and medication reconciliation.     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 Cruz      S/p ileostomy      Scope 5/2014-no strictures, fistulas, or new abscesses     COPD (chronic obstructive pulmonary disease) ([facility]) POA: Yes      Overview: On oxygen at night    Paroxysmal atrial fibrillation ([facility]) POA: Yes    Hypokalemia POA: Yes    SIRS (systemic inflammatory response syndrome) ([facility]) POA: Yes    High anion gap metabolic acidosis POA: Yes    Essential hypertension POA: Yes    FLORES (acute kidney injury) ([facility]) POA: Yes    Acute encephalopathy POA: Yes  Resolved Problems:    * No resolved hospital problems. *      FOLLOW UP  Future Appointments   Date Time Provider Department Center   6/16/2022  4:00 PM Chase Charles D.O. [facility] None   7/7/2022  2:00 PM Chase Charles D.O. [facility] None     Chase Charles D.O.  [id number] [address]  [phone]    Follow up in 1 week(s)        MEDICATIONS ON DISCHARGE     Medication List      CHANGE how you take these medications      Instructions   metoprolol tartrate 50 MG Tabs  What changed:   · how much to take  · how to take this  · when to take this  Commonly known as: LOPRESSOR   TAKE 1 TABLET BY MOUTH TWICE DAILY, HOLD IF BLOOD PRESSURE LESS THAN 95/50     prochlorperazine 10 MG Tabs  What changed: reasons to take this  Commonly known as: COMPAZINE   Take 1 Tablet by mouth 1 time a day as needed.  Dose: 10 mg        CONTINUE taking these medications      Instructions   ondansetron 4 MG Tbdp  Commonly known as: Zofran ODT   Take 1 Tablet by mouth every 6 hours as needed for Nausea.  Dose: 4 mg     oxyCODONE immediate release 10 MG immediate release tablet  Commonly known as: ROXICODONE   Take 10 mg by mouth every four hours as needed for Severe Pain. Indications: Chronic Pain  Dose: 10 mg     PROBIOTIC PO   Take 2 Capsules by mouth every day.  Dose: 2 Capsule     spironolactone 25 MG Tabs  Commonly known as: ALDACTONE   Take 2 Tablets by mouth every day.  Dose: 50 mg        STOP taking these medications    amoxicillin-clavulanate 875-125 MG Tabs  Commonly known as: AUGMENTIN     ciprofloxacin 500 MG  "Tabs  Commonly known as: CIPRO     EPINEPHrine 0.3 MG/0.3ML Soaj solution for injection  Commonly known as: EPIPEN     Naloxone 4 MG/0.1ML Liqd  Commonly known as: NARCAN     nystatin 395262 UNIT/ML Susp  Commonly known as: MYCOSTATIN     potassium chloride ER 10 MEQ tablet  Commonly known as: KLOR-CON        ASK your doctor about these medications      Instructions   methylPREDNISolone 4 MG Tbpk  Commonly known as: MEDROL DOSEPAK  Ask about: Which instructions should I use?   Take 4 mg by mouth every day. Follow schedule on package instructions.  Pt started on 5/5/2022 for 6 day course  Dose: 4 mg     traZODone 50 MG Tabs  Commonly known as: DESYREL  Ask about: Which instructions should I use?   Take 50 mg by mouth one time. Pt only took one night  Dose: 50 mg            Allergies  Allergies   Allergen Reactions   • Azathioprine Sodium Hives and Shortness of Breath   • Infliximab Hives, Shortness of Breath and Rash     .   • Methotrexate Hives, Shortness of Breath and Rash     .   • Methotrexate Hives, Rash and Shortness of Breath     .   • Morphine Shortness of Breath, Swelling and Palpitations   • Promethazine Shortness of Breath and Rash     .   • Roxanol [Morphine Sulfate] Swelling     Throat swells   • Amitriptyline Unspecified     Nightmares     • Carafate [Sucralfate] Vomiting and Nausea     Generalizes/Mouth Sores   • Demerol Vomiting   • Duragesic [Fentanyl]      \"Patches didn't work\"  Skin broke down   • Fentanyl Unspecified     Patches caused skin breakdown, no pain relief   • Lorazepam Unspecified     States give me nightmares.    • Meperidine Vomiting   • Methadone Unspecified     Didn't work   • Methadone Unspecified     Didn't work   • Other Drug Unspecified     steroids   • Pregabalin Rash     Rash     • Pseudoephedrine Vomiting   • Sulfa Drugs Hives and Rash     .  .   • Betamethasone Unspecified     Pt unable to remember   • Celestone [Betamethasone Sodium Phosphate] Unspecified     Pt unable to " remember   • Sulfasalazine Rash     On chest       DIET  No orders of the defined types were placed in this encounter.      ACTIVITY  As tolerated.  Weight bearing as tolerated    CONSULTATIONS  N/A    PROCEDURES  N/A    LABORATORY  Lab Results   Component Value Date    SODIUM 143 05/22/2022    POTASSIUM 3.4 (L) 05/22/2022    CHLORIDE 111 05/22/2022    CO2 21 05/22/2022    GLUCOSE 108 (H) 05/22/2022    BUN 8 05/22/2022    CREATININE 0.98 05/22/2022    CREATININE 0.89 02/10/2010    GLOMRATE >59 02/10/2010        Lab Results   Component Value Date    WBC 8.8 05/22/2022    WBC 7.9 02/10/2010    HEMOGLOBIN 12.4 05/22/2022    HEMATOCRIT 38.0 05/22/2022    PLATELETCT 244 05/22/2022        Total time of the discharge process exceeds 37 minutes.

## 2022-05-22 NOTE — PROGRESS NOTES
"Spoke with patient for about 30mins about her going home and her condition and that she had not thrown up for the past 24 hours. Pt  updated that patient was being discharged by Dr. Nettles.  up to get pt. Pt refusing to go home. Dr. Nettles to talk to her and  at bedside and pt getting aggressive. Pt ripped off stoma appliance and wafer off being verbally abusive to this RN stating \"youre stupid and you don't know anything\" explained to pt that I would assess her stoma. Stoma is intact protruding above skin with no lesions or sores. Stoma is beefy red and skin surrounding in clean dry and intact. PIV removed and discharge paperwork given to patient. Patient refusing to allow this RN to go over discharge instructions. Charge RN made aware. Security called. Pt escorted out of hospital.   "

## 2022-05-22 NOTE — PROGRESS NOTES
Pt was not sure or confused at when she took her medications last, called pts  (Goyo) @ [phone] and [phone], left message

## 2022-05-22 NOTE — CARE PLAN
The patient is Stable - Low risk of patient condition declining or worsening    Shift Goals  Clinical Goals: Pt will experience pain control by receiving pain medication  Patient Goals: Pt will be able to rest with minimal interruptions through end of shift    Progress made toward(s) clinical / shift goals:  Patient was administered pain medication however patient claims that she is constantly in pain and the pain medication is not enough for her.  The patient appears to be calm and comfortable after pain medication administration.  Patient was able to rest with minimal interruptions throughout shift.  Cares were clustered and provided at scheduled times throughout shift, however, the patient is in constant need of attention and does not rest well.      Patient is not progressing towards the following goals:

## 2022-05-22 NOTE — PROGRESS NOTES
Med rec updated and complete, per   Allergies reviewed, per  and pt  Interviewed pt with  at bedside with permission from pt.  Pts  reports that pt only took one dose of TRAZODONE on 5/19/2022, and has not taken anymore.

## 2022-05-23 ENCOUNTER — APPOINTMENT (OUTPATIENT)
Dept: RADIOLOGY | Facility: MEDICAL CENTER | Age: 67
DRG: 439 | End: 2022-05-23
Attending: INTERNAL MEDICINE
Payer: MEDICARE

## 2022-05-23 ENCOUNTER — HOSPITAL ENCOUNTER (INPATIENT)
Facility: MEDICAL CENTER | Age: 67
LOS: 2 days | DRG: 439 | End: 2022-05-25
Attending: EMERGENCY MEDICINE | Admitting: INTERNAL MEDICINE
Payer: MEDICARE

## 2022-05-23 ENCOUNTER — APPOINTMENT (OUTPATIENT)
Dept: RADIOLOGY | Facility: MEDICAL CENTER | Age: 67
DRG: 439 | End: 2022-05-23
Attending: EMERGENCY MEDICINE
Payer: MEDICARE

## 2022-05-23 DIAGNOSIS — R10.9 ABDOMINAL PAIN OF MULTIPLE SITES: ICD-10-CM

## 2022-05-23 DIAGNOSIS — M51.36 DDD (DEGENERATIVE DISC DISEASE), LUMBAR: ICD-10-CM

## 2022-05-23 DIAGNOSIS — R11.2 INTRACTABLE VOMITING WITH NAUSEA, UNSPECIFIED VOMITING TYPE: ICD-10-CM

## 2022-05-23 DIAGNOSIS — K85.90 ACUTE PANCREATITIS, UNSPECIFIED COMPLICATION STATUS, UNSPECIFIED PANCREATITIS TYPE: ICD-10-CM

## 2022-05-23 DIAGNOSIS — E86.0 DEHYDRATION: ICD-10-CM

## 2022-05-23 DIAGNOSIS — B37.0 THRUSH OF MOUTH AND ESOPHAGUS (HCC): ICD-10-CM

## 2022-05-23 DIAGNOSIS — B37.81 THRUSH OF MOUTH AND ESOPHAGUS (HCC): ICD-10-CM

## 2022-05-23 LAB
ALBUMIN SERPL BCP-MCNC: 4.6 G/DL (ref 3.2–4.9)
ALBUMIN/GLOB SERPL: 1.3 G/DL
ALP SERPL-CCNC: 106 U/L (ref 30–99)
ALT SERPL-CCNC: 27 U/L (ref 2–50)
ANION GAP SERPL CALC-SCNC: 15 MMOL/L (ref 7–16)
APTT PPP: 28.3 SEC (ref 24.7–36)
AST SERPL-CCNC: 30 U/L (ref 12–45)
BASOPHILS # BLD AUTO: 0.8 % (ref 0–1.8)
BASOPHILS # BLD: 0.06 K/UL (ref 0–0.12)
BILIRUB SERPL-MCNC: 0.4 MG/DL (ref 0.1–1.5)
BUN SERPL-MCNC: 12 MG/DL (ref 8–22)
CALCIUM SERPL-MCNC: 10 MG/DL (ref 8.4–10.2)
CHLORIDE SERPL-SCNC: 107 MMOL/L (ref 96–112)
CHOLEST SERPL-MCNC: 194 MG/DL (ref 100–199)
CO2 SERPL-SCNC: 22 MMOL/L (ref 20–33)
CREAT SERPL-MCNC: 1.03 MG/DL (ref 0.5–1.4)
EKG IMPRESSION: NORMAL
EKG IMPRESSION: NORMAL
EOSINOPHIL # BLD AUTO: 0.09 K/UL (ref 0–0.51)
EOSINOPHIL NFR BLD: 1.2 % (ref 0–6.9)
ERYTHROCYTE [DISTWIDTH] IN BLOOD BY AUTOMATED COUNT: 43 FL (ref 35.9–50)
GFR SERPLBLD CREATININE-BSD FMLA CKD-EPI: 59 ML/MIN/1.73 M 2
GLOBULIN SER CALC-MCNC: 3.5 G/DL (ref 1.9–3.5)
GLUCOSE SERPL-MCNC: 84 MG/DL (ref 65–99)
HCT VFR BLD AUTO: 39 % (ref 37–47)
HDLC SERPL-MCNC: 81 MG/DL
HGB BLD-MCNC: 12.8 G/DL (ref 12–16)
IMM GRANULOCYTES # BLD AUTO: 0.03 K/UL (ref 0–0.11)
IMM GRANULOCYTES NFR BLD AUTO: 0.4 % (ref 0–0.9)
INR PPP: 1.2 (ref 0.87–1.13)
LACTATE BLD-SCNC: 1.2 MMOL/L (ref 0.5–2)
LDLC SERPL CALC-MCNC: 90 MG/DL
LIPASE SERPL-CCNC: 179 U/L (ref 7–58)
LYMPHOCYTES # BLD AUTO: 1.67 K/UL (ref 1–4.8)
LYMPHOCYTES NFR BLD: 22 % (ref 22–41)
MCH RBC QN AUTO: 30.7 PG (ref 27–33)
MCHC RBC AUTO-ENTMCNC: 32.8 G/DL (ref 33.6–35)
MCV RBC AUTO: 93.5 FL (ref 81.4–97.8)
MONOCYTES # BLD AUTO: 0.66 K/UL (ref 0–0.85)
MONOCYTES NFR BLD AUTO: 8.7 % (ref 0–13.4)
NEUTROPHILS # BLD AUTO: 5.09 K/UL (ref 2–7.15)
NEUTROPHILS NFR BLD: 66.9 % (ref 44–72)
NRBC # BLD AUTO: 0 K/UL
NRBC BLD-RTO: 0 /100 WBC
NT-PROBNP SERPL IA-MCNC: 607 PG/ML (ref 0–125)
PLATELET # BLD AUTO: 233 K/UL (ref 164–446)
PMV BLD AUTO: 9.7 FL (ref 9–12.9)
POTASSIUM SERPL-SCNC: 3.8 MMOL/L (ref 3.6–5.5)
PROT SERPL-MCNC: 8.1 G/DL (ref 6–8.2)
PROTHROMBIN TIME: 14.3 SEC (ref 12–14.6)
RBC # BLD AUTO: 4.17 M/UL (ref 4.2–5.4)
SODIUM SERPL-SCNC: 144 MMOL/L (ref 135–145)
TRIGL SERPL-MCNC: 113 MG/DL (ref 0–149)
TROPONIN T SERPL-MCNC: 10 NG/L (ref 6–19)
WBC # BLD AUTO: 7.6 K/UL (ref 4.8–10.8)

## 2022-05-23 PROCEDURE — 99407 BEHAV CHNG SMOKING > 10 MIN: CPT

## 2022-05-23 PROCEDURE — 700102 HCHG RX REV CODE 250 W/ 637 OVERRIDE(OP): Performed by: EMERGENCY MEDICINE

## 2022-05-23 PROCEDURE — 76705 ECHO EXAM OF ABDOMEN: CPT

## 2022-05-23 PROCEDURE — 84484 ASSAY OF TROPONIN QUANT: CPT

## 2022-05-23 PROCEDURE — 80061 LIPID PANEL: CPT

## 2022-05-23 PROCEDURE — 93005 ELECTROCARDIOGRAM TRACING: CPT | Performed by: EMERGENCY MEDICINE

## 2022-05-23 PROCEDURE — 74177 CT ABD & PELVIS W/CONTRAST: CPT | Mod: MG

## 2022-05-23 PROCEDURE — 83690 ASSAY OF LIPASE: CPT

## 2022-05-23 PROCEDURE — 80053 COMPREHEN METABOLIC PANEL: CPT

## 2022-05-23 PROCEDURE — 99223 1ST HOSP IP/OBS HIGH 75: CPT | Performed by: INTERNAL MEDICINE

## 2022-05-23 PROCEDURE — 700105 HCHG RX REV CODE 258: Performed by: EMERGENCY MEDICINE

## 2022-05-23 PROCEDURE — C9113 INJ PANTOPRAZOLE SODIUM, VIA: HCPCS | Performed by: EMERGENCY MEDICINE

## 2022-05-23 PROCEDURE — 83880 ASSAY OF NATRIURETIC PEPTIDE: CPT

## 2022-05-23 PROCEDURE — 85610 PROTHROMBIN TIME: CPT

## 2022-05-23 PROCEDURE — 83605 ASSAY OF LACTIC ACID: CPT

## 2022-05-23 PROCEDURE — A9270 NON-COVERED ITEM OR SERVICE: HCPCS | Performed by: EMERGENCY MEDICINE

## 2022-05-23 PROCEDURE — 99285 EMERGENCY DEPT VISIT HI MDM: CPT

## 2022-05-23 PROCEDURE — 770006 HCHG ROOM/CARE - MED/SURG/GYN SEMI*

## 2022-05-23 PROCEDURE — 85730 THROMBOPLASTIN TIME PARTIAL: CPT

## 2022-05-23 PROCEDURE — 96374 THER/PROPH/DIAG INJ IV PUSH: CPT

## 2022-05-23 PROCEDURE — A9270 NON-COVERED ITEM OR SERVICE: HCPCS | Performed by: INTERNAL MEDICINE

## 2022-05-23 PROCEDURE — 96375 TX/PRO/DX INJ NEW DRUG ADDON: CPT

## 2022-05-23 PROCEDURE — 700111 HCHG RX REV CODE 636 W/ 250 OVERRIDE (IP): Performed by: EMERGENCY MEDICINE

## 2022-05-23 PROCEDURE — 85025 COMPLETE CBC W/AUTO DIFF WBC: CPT

## 2022-05-23 PROCEDURE — 700102 HCHG RX REV CODE 250 W/ 637 OVERRIDE(OP): Performed by: INTERNAL MEDICINE

## 2022-05-23 PROCEDURE — 700101 HCHG RX REV CODE 250: Performed by: INTERNAL MEDICINE

## 2022-05-23 PROCEDURE — 36415 COLL VENOUS BLD VENIPUNCTURE: CPT

## 2022-05-23 PROCEDURE — 700117 HCHG RX CONTRAST REV CODE 255: Performed by: EMERGENCY MEDICINE

## 2022-05-23 RX ORDER — TRAZODONE HYDROCHLORIDE 50 MG/1
50 TABLET ORAL
Status: DISCONTINUED | OUTPATIENT
Start: 2022-05-23 | End: 2022-05-25 | Stop reason: HOSPADM

## 2022-05-23 RX ORDER — ONDANSETRON 2 MG/ML
4 INJECTION INTRAMUSCULAR; INTRAVENOUS EVERY 4 HOURS PRN
Status: DISCONTINUED | OUTPATIENT
Start: 2022-05-23 | End: 2022-05-25

## 2022-05-23 RX ORDER — PANTOPRAZOLE SODIUM 40 MG/10ML
40 INJECTION, POWDER, LYOPHILIZED, FOR SOLUTION INTRAVENOUS ONCE
Status: COMPLETED | OUTPATIENT
Start: 2022-05-23 | End: 2022-05-23

## 2022-05-23 RX ORDER — LORAZEPAM 2 MG/ML
0.5 INJECTION INTRAMUSCULAR EVERY 4 HOURS PRN
Status: DISCONTINUED | OUTPATIENT
Start: 2022-05-23 | End: 2022-05-25

## 2022-05-23 RX ORDER — ONDANSETRON 2 MG/ML
4 INJECTION INTRAMUSCULAR; INTRAVENOUS ONCE
Status: COMPLETED | OUTPATIENT
Start: 2022-05-23 | End: 2022-05-24

## 2022-05-23 RX ORDER — POLYETHYLENE GLYCOL 3350 17 G/17G
1 POWDER, FOR SOLUTION ORAL
Status: DISCONTINUED | OUTPATIENT
Start: 2022-05-23 | End: 2022-05-25 | Stop reason: HOSPADM

## 2022-05-23 RX ORDER — HYDRALAZINE HYDROCHLORIDE 20 MG/ML
10 INJECTION INTRAMUSCULAR; INTRAVENOUS EVERY 4 HOURS PRN
Status: DISCONTINUED | OUTPATIENT
Start: 2022-05-23 | End: 2022-05-25 | Stop reason: HOSPADM

## 2022-05-23 RX ORDER — AMOXICILLIN 250 MG
2 CAPSULE ORAL 2 TIMES DAILY
Status: DISCONTINUED | OUTPATIENT
Start: 2022-05-23 | End: 2022-05-25 | Stop reason: HOSPADM

## 2022-05-23 RX ORDER — SODIUM CHLORIDE AND POTASSIUM CHLORIDE 150; 900 MG/100ML; MG/100ML
INJECTION, SOLUTION INTRAVENOUS CONTINUOUS
Status: DISCONTINUED | OUTPATIENT
Start: 2022-05-23 | End: 2022-05-24

## 2022-05-23 RX ORDER — METOPROLOL TARTRATE 50 MG/1
50 TABLET, FILM COATED ORAL 2 TIMES DAILY
Status: DISCONTINUED | OUTPATIENT
Start: 2022-05-23 | End: 2022-05-23

## 2022-05-23 RX ORDER — DIPHENHYDRAMINE HYDROCHLORIDE 50 MG/ML
25 INJECTION INTRAMUSCULAR; INTRAVENOUS ONCE
Status: COMPLETED | OUTPATIENT
Start: 2022-05-23 | End: 2022-05-23

## 2022-05-23 RX ORDER — ACETAMINOPHEN 325 MG/1
650 TABLET ORAL EVERY 6 HOURS PRN
Status: DISCONTINUED | OUTPATIENT
Start: 2022-05-23 | End: 2022-05-25 | Stop reason: HOSPADM

## 2022-05-23 RX ORDER — SODIUM CHLORIDE 9 MG/ML
1000 INJECTION, SOLUTION INTRAVENOUS ONCE
Status: COMPLETED | OUTPATIENT
Start: 2022-05-23 | End: 2022-05-23

## 2022-05-23 RX ORDER — LORAZEPAM 2 MG/ML
1 INJECTION INTRAMUSCULAR ONCE
Status: COMPLETED | OUTPATIENT
Start: 2022-05-23 | End: 2022-05-23

## 2022-05-23 RX ORDER — METOPROLOL TARTRATE 50 MG/1
50 TABLET, FILM COATED ORAL 2 TIMES DAILY
Status: DISCONTINUED | OUTPATIENT
Start: 2022-05-23 | End: 2022-05-25 | Stop reason: HOSPADM

## 2022-05-23 RX ORDER — ONDANSETRON 4 MG/1
4 TABLET, ORALLY DISINTEGRATING ORAL EVERY 4 HOURS PRN
Status: DISCONTINUED | OUTPATIENT
Start: 2022-05-23 | End: 2022-05-25 | Stop reason: HOSPADM

## 2022-05-23 RX ORDER — OXYCODONE HYDROCHLORIDE 10 MG/1
10 TABLET ORAL EVERY 4 HOURS PRN
Status: DISCONTINUED | OUTPATIENT
Start: 2022-05-23 | End: 2022-05-25 | Stop reason: HOSPADM

## 2022-05-23 RX ORDER — PROCHLORPERAZINE EDISYLATE 5 MG/ML
10 INJECTION INTRAMUSCULAR; INTRAVENOUS ONCE
Status: COMPLETED | OUTPATIENT
Start: 2022-05-23 | End: 2022-05-23

## 2022-05-23 RX ORDER — BISACODYL 10 MG
10 SUPPOSITORY, RECTAL RECTAL
Status: DISCONTINUED | OUTPATIENT
Start: 2022-05-23 | End: 2022-05-25 | Stop reason: HOSPADM

## 2022-05-23 RX ADMIN — NYSTATIN 500000 UNITS: 100000 SUSPENSION ORAL at 09:54

## 2022-05-23 RX ADMIN — IOHEXOL 100 ML: 350 INJECTION, SOLUTION INTRAVENOUS at 03:05

## 2022-05-23 RX ADMIN — OXYCODONE HYDROCHLORIDE 10 MG: 10 TABLET ORAL at 12:00

## 2022-05-23 RX ADMIN — NYSTATIN 500000 UNITS: 100000 SUSPENSION ORAL at 18:15

## 2022-05-23 RX ADMIN — POTASSIUM CHLORIDE AND SODIUM CHLORIDE: 900; 150 INJECTION, SOLUTION INTRAVENOUS at 16:12

## 2022-05-23 RX ADMIN — SODIUM CHLORIDE 1000 ML: 9 INJECTION, SOLUTION INTRAVENOUS at 02:37

## 2022-05-23 RX ADMIN — NYSTATIN 500000 UNITS: 100000 SUSPENSION ORAL at 02:27

## 2022-05-23 RX ADMIN — METOPROLOL TARTRATE 50 MG: 50 TABLET, FILM COATED ORAL at 18:15

## 2022-05-23 RX ADMIN — POTASSIUM CHLORIDE AND SODIUM CHLORIDE: 900; 150 INJECTION, SOLUTION INTRAVENOUS at 06:12

## 2022-05-23 RX ADMIN — RIVAROXABAN 10 MG: 10 TABLET, FILM COATED ORAL at 09:54

## 2022-05-23 RX ADMIN — PROCHLORPERAZINE EDISYLATE 10 MG: 5 INJECTION INTRAMUSCULAR; INTRAVENOUS at 02:42

## 2022-05-23 RX ADMIN — DIPHENHYDRAMINE HYDROCHLORIDE 25 MG: 50 INJECTION, SOLUTION INTRAMUSCULAR; INTRAVENOUS at 02:42

## 2022-05-23 RX ADMIN — OXYCODONE HYDROCHLORIDE 10 MG: 10 TABLET ORAL at 18:15

## 2022-05-23 RX ADMIN — METOPROLOL TARTRATE 50 MG: 50 TABLET, FILM COATED ORAL at 09:53

## 2022-05-23 RX ADMIN — PANTOPRAZOLE SODIUM 40 MG: 40 INJECTION, POWDER, FOR SOLUTION INTRAVENOUS at 03:03

## 2022-05-23 RX ADMIN — NYSTATIN 500000 UNITS: 100000 SUSPENSION ORAL at 13:19

## 2022-05-23 RX ADMIN — TRAZODONE HYDROCHLORIDE 50 MG: 50 TABLET ORAL at 20:08

## 2022-05-23 RX ADMIN — LORAZEPAM 1 MG: 2 INJECTION INTRAMUSCULAR; INTRAVENOUS at 03:04

## 2022-05-23 RX ADMIN — NYSTATIN 500000 UNITS: 100000 SUSPENSION ORAL at 20:08

## 2022-05-23 ASSESSMENT — ENCOUNTER SYMPTOMS
COUGH: 0
DIZZINESS: 0
ABDOMINAL PAIN: 1
SHORTNESS OF BREATH: 0
VOMITING: 1
DEPRESSION: 0
NAUSEA: 1
MYALGIAS: 0
STRIDOR: 0
FALLS: 0
LOSS OF CONSCIOUSNESS: 0
HEADACHES: 0
TINGLING: 0
DIARRHEA: 0
SPUTUM PRODUCTION: 0
CHILLS: 0
SORE THROAT: 1
FEVER: 0
CONSTIPATION: 0
PALPITATIONS: 0

## 2022-05-23 ASSESSMENT — COGNITIVE AND FUNCTIONAL STATUS - GENERAL
MOBILITY SCORE: 24
SUGGESTED CMS G CODE MODIFIER MOBILITY: CH
DAILY ACTIVITIY SCORE: 24
SUGGESTED CMS G CODE MODIFIER DAILY ACTIVITY: CH

## 2022-05-23 ASSESSMENT — FIBROSIS 4 INDEX
FIB4 SCORE: 1.42
FIB4 SCORE: 1.42

## 2022-05-23 ASSESSMENT — PAIN DESCRIPTION - PAIN TYPE: TYPE: ACUTE PAIN

## 2022-05-23 NOTE — ED NOTES
Pt updated that ERP is tied up at the moment in a procedure and will be delayed getting to her for evaluation. Verbalizes understanding. Pt's Cell phone retrieved from [employer] for her. Denies need at this time. NAD.

## 2022-05-23 NOTE — ED NOTES
Reviewed Ativan order with pt and pt is in agreement despite Night Terror as a reaction on her allergy list. Pt is aware that she received it recently and states is was very helpful and requested it from ERP today to feel better.

## 2022-05-23 NOTE — ASSESSMENT & PLAN NOTE
- Was recently treated for a flare with Medrol Dosepak, has received multiple days of steroid  -I am not going to continue the steroid at this time due to concern for steroid psychosis  -I do not think her current pain is a Crohn's flare

## 2022-05-23 NOTE — H&P
Hospital Medicine History & Physical Note    Date of Service  2022    Primary Care Physician  Chase Charles D.O.    Consultants  None    Specialist Names: None    Code Status  Full Code    Chief Complaint  Chief Complaint   Patient presents with   • N/V   • Sore Throat   • Weakness   • Insomnia     Pt to ED tonight after recent admission for dehydration. Pt was admitted to this facility for hydration d/t persistent N/V. Pt left early due to attending a .        History of Presenting Illness  Jana Overton is a 66 y.o. female who presented 2022 with nausea, vomiting and abdominal pain.  Patient states she became ill on Thursday, has had persistent nausea and vomiting, has not noted any blood.  She was recently admitted, discharged on .  Patient was admitted, diagnosed with a Crohn's flare at that time, improved and was discharged.  Patient potentially had some mental changes with the steroids, confusion documented.  Patient states she began having nausea and vomiting shortly after being discharged, unable to keep anything down.  She now complains of a sore throat.  She has generalized abdominal pain that can be severe at times.  She has received multiple medications for nausea and vomiting, is currently somnolent, most of the information obtained from the chart.  I did discuss the case including labs and imaging with the ER physician.    I discussed the plan of care with ERP.    Review of Systems  Review of Systems   Constitutional: Positive for malaise/fatigue. Negative for chills and fever.   HENT: Positive for sore throat. Negative for congestion.    Respiratory: Negative for cough, sputum production, shortness of breath and stridor.    Cardiovascular: Negative for chest pain, palpitations and leg swelling.   Gastrointestinal: Positive for abdominal pain, nausea and vomiting. Negative for constipation and diarrhea.   Genitourinary: Negative for dysuria and urgency.   Musculoskeletal:  Negative for falls and myalgias.   Skin: Positive for itching and rash.   Neurological: Negative for dizziness, tingling, loss of consciousness and headaches.   Psychiatric/Behavioral: Negative for depression and suicidal ideas.   All other systems reviewed and are negative.      Past Medical History   has a past medical history of Anesthesia, Anxiety, Arthritis, ASTHMA, Atrial fib/flut, Backpain, Bronchitis, Chronic pain, Colostomy in place ([facility]) (10/14/2010), COPD (chronic obstructive pulmonary disease) ([facility]) (6/24/2014), COPD (chronic obstructive pulmonary disease) ([facility]) (6/24/2014), Crohn's disease of colon ([facility]), Dyspnea, History of cardiac murmur, Hypokalemia (6/24/2014), Ileostomy present ([facility]), Infectious disease, Multiple falls (6/24/2014), Narcotic dependence ([facility]) (9/23/2009), Obstruction, Other specified disorder of intestines, Pain (7/11/13), Pneumonia, Postherpetic neuralgia (6/24/2014), Rosacea (6/24/2014), and Sleep apnea.    Surgical History   has a past surgical history that includes lumbar fusion anterior; cervical disk and fusion anterior; foot surgery; hand surgery; other abdominal surgery; gastroscopy with biopsy (11/22/08); ileostomy (11/11/2009); dilation and curettage (9/24/2010); gastroscopy (10/4/2011); colonoscopy (10/4/2011); hardware removal neuro (7/16/2012); mammoplasty reduction (7/17/2013); ileostomy (5/14/2014); pr breast reduction; abdominal exploration; lumpectomy; colon resection; ileostomy (12/29/2018); sigmoidoscopy flex (12/29/2018); exploratory laparotomy (12/29/2018); gastroscopy (1/6/2019); gastroscopy (N/A, 5/22/2019); ileostomy (1/20/2021); lysis adhesions general (1/20/2021); pr cysto/uretero/pyeloscopy, dx (Right, 12/8/2021); cysto stent placemnt pre surg (Right, 12/8/2021); pr cystoscopy,insert ureteral stent (Right, 12/23/2021); and pr cysto/uretero/pyeloscopy, dx (Right, 12/23/2021).     Family History  family history includes Cancer (age of onset: 40) in her  "maternal aunt; Diabetes in her father and sister; Heart Disease in her father; Lung Disease in her mother.   Family history reviewed with patient. There is no family history that is pertinent to the chief complaint.     Social History   reports that she has never smoked. She has never used smokeless tobacco. She reports previous alcohol use of about 0.6 oz of alcohol per week. She reports previous drug use. Drugs: Marijuana and Inhaled.    Allergies  Allergies   Allergen Reactions   • Azathioprine Sodium Hives and Shortness of Breath   • Infliximab Hives, Shortness of Breath and Rash     .   • Methotrexate Hives, Shortness of Breath and Rash     .   • Methotrexate Hives, Rash and Shortness of Breath     .   • Morphine Shortness of Breath, Swelling and Palpitations   • Promethazine Shortness of Breath and Rash     .   • Roxanol [Morphine Sulfate] Swelling     Throat swells   • Amitriptyline Unspecified     Nightmares     • Carafate [Sucralfate] Vomiting and Nausea     Generalizes/Mouth Sores   • Demerol Vomiting   • Duragesic [Fentanyl]      \"Patches didn't work\"  Skin broke down   • Fentanyl Unspecified     Patches caused skin breakdown, no pain relief   • Lorazepam Unspecified     States give me nightmares.    • Meperidine Vomiting   • Methadone Unspecified     Didn't work   • Methadone Unspecified     Didn't work   • Other Drug Unspecified     steroids   • Pregabalin Rash     Rash     • Pseudoephedrine Vomiting   • Sulfa Drugs Hives and Rash     .  .   • Betamethasone Unspecified     Pt unable to remember   • Celestone [Betamethasone Sodium Phosphate] Unspecified     Pt unable to remember   • Sulfasalazine Rash     On chest       Medications  Prior to Admission Medications   Prescriptions Last Dose Informant Patient Reported? Taking?   Probiotic Product (PROBIOTIC PO)  Significant Other Yes No   Sig: Take 2 Capsules by mouth every day.   methylPREDNISolone (MEDROL DOSEPAK) 4 MG Tablet Therapy Pack  Significant " Other Yes No   Sig: Take 4 mg by mouth every day. Follow schedule on package instructions.  Pt started on 5/5/2022 for 6 day course   metoprolol tartrate (LOPRESSOR) 50 MG Tab  Significant Other No No   Sig: TAKE 1 TABLET BY MOUTH TWICE DAILY, HOLD IF BLOOD PRESSURE LESS THAN 95/50   Patient taking differently: Take 50 mg by mouth 2 times a day. TAKE 1 TABLET BY MOUTH TWICE DAILY, HOLD IF BLOOD PRESSURE LESS THAN 95/50   ondansetron (ZOFRAN ODT) 4 MG TABLET DISPERSIBLE  Significant Other No No   Sig: Take 1 Tablet by mouth every 6 hours as needed for Nausea.   oxyCODONE immediate release (ROXICODONE) 10 MG immediate release tablet  Significant Other Yes No   Sig: Take 10 mg by mouth every four hours as needed for Severe Pain. Indications: Chronic Pain   prochlorperazine (COMPAZINE) 10 MG Tab  Significant Other No No   Sig: Take 1 Tablet by mouth 1 time a day as needed.   Patient taking differently: Take 10 mg by mouth 1 time a day as needed for Nausea/Vomiting.   spironolactone (ALDACTONE) 25 MG Tab  Significant Other No No   Sig: Take 2 Tablets by mouth every day.   traZODone (DESYREL) 50 MG Tab  Significant Other Yes No   Sig: Take 50 mg by mouth one time. Pt only took one night      Facility-Administered Medications: None       Physical Exam  Temp:  [36.2 °C (97.1 °F)] 36.2 °C (97.1 °F)  Pulse:  [88] 88  Resp:  [18] 18  BP: (150)/(68) 150/68  SpO2:  [99 %] 99 %  Blood Pressure : (!) 150/68   Temperature: 36.2 °C (97.1 °F)   Pulse: 88   Respiration: 18   Pulse Oximetry: 99 %       Physical Exam  Vitals and nursing note reviewed.   Constitutional:       General: She is not in acute distress.     Appearance: She is well-developed. She is not diaphoretic.   HENT:      Head: Normocephalic and atraumatic.      Right Ear: External ear normal.      Left Ear: External ear normal.      Nose: Nose normal. No congestion or rhinorrhea.   Eyes:      General:         Right eye: No discharge.         Left eye: No discharge.    Neck:      Trachea: No tracheal deviation.   Cardiovascular:      Rate and Rhythm: Normal rate and regular rhythm.      Heart sounds: No murmur heard.    No friction rub. No gallop.   Pulmonary:      Effort: Pulmonary effort is normal. No respiratory distress.      Breath sounds: Normal breath sounds. No stridor. No wheezing or rales.   Abdominal:      General: Bowel sounds are decreased. There is no distension.      Palpations: Abdomen is soft.      Tenderness: There is generalized abdominal tenderness.      Comments: Ostomy    Musculoskeletal:      Cervical back: Neck supple.   Lymphadenopathy:      Cervical: No cervical adenopathy.   Skin:     General: Skin is warm and dry.      Findings: No erythema or rash.         Laboratory:  Recent Labs     05/20/22 2225 05/22/22 0420 05/23/22 0236   WBC 13.5* 8.8 7.6   RBC 4.29 4.00* 4.17*   HEMOGLOBIN 13.4 12.4 12.8   HEMATOCRIT 40.3 38.0 39.0   MCV 93.9 95.0 93.5   MCH 31.2 31.0 30.7   MCHC 33.3* 32.6* 32.8*   RDW 41.7 43.0 43.0   PLATELETCT 261 244 233   MPV 9.9 10.1 9.7     Recent Labs     05/20/22 2225 05/22/22 0420 05/23/22 0236   SODIUM 139 143 144   POTASSIUM 3.5* 3.4* 3.8   CHLORIDE 101 111 107   CO2 17* 21 22   GLUCOSE 86 108* 84   BUN 17 8 12   CREATININE 1.17 0.98 1.03   CALCIUM 9.8 9.1 10.0     Recent Labs     05/20/22 2225 05/22/22 0420 05/23/22  0236   ALTSGPT 30  --  27   ASTSGOT 28  --  30   ALKPHOSPHAT 121*  --  106*   TBILIRUBIN 0.7  --  0.4   LIPASE  --   --  179*   GLUCOSE 86 108* 84     Recent Labs     05/23/22 0236   APTT 28.3   INR 1.20*     Recent Labs     05/23/22 0236   NTPROBNP 607*         Recent Labs     05/20/22 2225 05/23/22  0236   TROPONINT 14 10       Imaging:  CT-ABDOMEN-PELVIS WITH   Final Result         1.  No acute intra-abdominal abnormality   2.  Hepatomegaly   3.  Atherosclerotic          no X-Ray or EKG requiring interpretation    Assessment/Plan:  Justification for Admission Status  I anticipate this patient will  require at least two midnights for appropriate medical management, necessitating inpatient admission because Treatment of acute pancreatitis    * Acute pancreatitis- (present on admission)  Assessment & Plan  - Causing pain but significant nausea and vomiting  -Start multiple antiemetics  -Causing dehydration, start IV fluids  -Associated with an elevated alk phos but a normal total bilirubin  -No history of significant alcohol use  -Obtain right upper quadrant ultrasound as well as a lipid panel  -Patient does require admission at this time, if she were not admitted, cryptitis would continue to worsen, eventually risking sepsis and death  -I do anticipate patient requiring more than 2 days of hospitalization    FLORES (acute kidney injury) (HCC)- (present on admission)  Assessment & Plan  - Mild, due to dehydration  -Start IV fluids  -Repeat BMP in the morning    Essential hypertension- (present on admission)  Assessment & Plan  - Continue home metoprolol  -Start as needed hydralazine  -Adjust as needed    Paroxysmal atrial fibrillation (HCC)- (present on admission)  Assessment & Plan  - Currently sinus rhythm on home metoprolol, continue  -Patient is not on full dose anticoagulation, will not start at this time, will use DVT prophylaxis dose of Xarelto    COPD (chronic obstructive pulmonary disease) (HCC)- (present on admission)  Assessment & Plan  - No acute exacerbation    Chronic pain- (present on admission)  Assessment & Plan  - Continue oxycodone without increase at this time    Crohn's disease of small intestine with complication (HCC)- (present on admission)  Assessment & Plan  - Was recently treated for a flare with Medrol Dosepak, has received multiple days of steroid  -I am not going to continue the steroid at this time due to concern for steroid psychosis  -I do not think her current pain is a Crohn's flare      VTE prophylaxis: SCDs/TEDs and Xarelto 10 mg daily as prophylaxis

## 2022-05-23 NOTE — ED TRIAGE NOTES
Chief Complaint   Patient presents with   • N/V   • Sore Throat   • Weakness   • Insomnia     Pt to ED tonight after recent admission for dehydration. Pt was admitted to this facility for hydration d/t persistent N/V. Pt left early due to attending a .      BP (!) 150/68   Pulse 88   Temp 36.2 °C (97.1 °F) (Oral)   Resp 18   Ht 1.829 m (6')   Wt 72.6 kg (160 lb)   SpO2 99%   BMI 21.70 kg/m²

## 2022-05-23 NOTE — ED PROVIDER NOTES
ED Provider Note    ED Provider Note    Scribed for Arnie Hillman MD by Arnie Hillman M.D.. 2022, 1:57 AM.    Primary care provider: Chase Charles D.O.  Means of arrival: Private  History obtained from: Patient  History limited by: None    CHIEF COMPLAINT  Chief Complaint   Patient presents with   • N/V   • Sore Throat   • Weakness   • Insomnia     Pt to ED tonight after recent admission for dehydration. Pt was admitted to this facility for hydration d/t persistent N/V. Pt left early due to attending a .        HPI  Jana Overton is a 66 y.o. female with history of Crohn's disease status post colectomy with ostomy placement who presents to the Emergency Department for evaluation of persistent nausea and vomiting.  Patient has been ill since Thursday, she was admitted for on the  as she was unable to tolerate oral intake despite Zofran and Compazine at home.  Patient relates recent steroid course for presumed Crohn's flare, chart review demonstrates concern for possible steroid psychosis.  She does have some pressured speech today but otherwise able to give a cognizant history.  She notes after the steroids she developed discomfort and redness to skin more so the upper extremities and also describes white plaques in the mouth concerning for thrush.  Patient discharged this morning noting she had been feeling better after IV fluids.  She is concerned for possible weight gain after the IV fluids and notes nonetheless persistent sensation of thirst and persistent vomiting, she threw up in the trash can when she was roomed in the ED.  No known fever but endorses chills with fatigue and generalized weakness.  Generalized body aches appreciated as well, dark urine and dry oral mucous membranes appreciated by the patient.  She notes very poor sleep after the steroids as well.  Given persistent symptoms and unable to tolerate oral intake she came to be reassessed.    REVIEW OF  SYSTEMS  Pertinent positives include nausea, vomiting, generalized abdominal pain, mouth and throat discomfort, uncomfortable red rash to upper extremities, weakness, insomnia. Pertinent negatives include no fever, no acute abdominal trauma, no blood in ostomy output.  All other systems reviewed and negative.    PAST MEDICAL HISTORY   has a past medical history of Anesthesia, Anxiety, Arthritis, ASTHMA, Atrial fib/flut, Backpain, Bronchitis, Chronic pain, Colostomy in place ([facility]) (10/14/2010), COPD (chronic obstructive pulmonary disease) ([facility]) (6/24/2014), COPD (chronic obstructive pulmonary disease) ([facility]) (6/24/2014), Crohn's disease of colon ([facility]), Dyspnea, History of cardiac murmur, Hypokalemia (6/24/2014), Ileostomy present ([facility]), Infectious disease, Multiple falls (6/24/2014), Narcotic dependence ([facility]) (9/23/2009), Obstruction, Other specified disorder of intestines, Pain (7/11/13), Pneumonia, Postherpetic neuralgia (6/24/2014), Rosacea (6/24/2014), and Sleep apnea.    SURGICAL HISTORY   has a past surgical history that includes lumbar fusion anterior; cervical disk and fusion anterior; foot surgery; hand surgery; other abdominal surgery; gastroscopy with biopsy (11/22/08); ileostomy (11/11/2009); dilation and curettage (9/24/2010); gastroscopy (10/4/2011); colonoscopy (10/4/2011); hardware removal neuro (7/16/2012); mammoplasty reduction (7/17/2013); ileostomy (5/14/2014); breast reduction; abdominal exploration; lumpectomy; colon resection; ileostomy (12/29/2018); sigmoidoscopy flex (12/29/2018); exploratory laparotomy (12/29/2018); gastroscopy (1/6/2019); gastroscopy (N/A, 5/22/2019); ileostomy (1/20/2021); lysis adhesions general (1/20/2021); cysto/uretero/pyeloscopy, dx (Right, 12/8/2021); cysto stent placemnt pre surg (Right, 12/8/2021); cystoscopy,insert ureteral stent (Right, 12/23/2021); and cysto/uretero/pyeloscopy, dx (Right, 12/23/2021).    SOCIAL HISTORY  Social History     Tobacco Use   • Smoking  "status: Never Smoker   • Smokeless tobacco: Never Used   • Tobacco comment: second hand smoke parents - smoked for only 1 year many years ago   Vaping Use   • Vaping Use: Never used   Substance Use Topics   • Alcohol use: Not Currently     Alcohol/week: 0.6 oz     Types: 1 Shots of liquor per week     Comment: gin and half a lime; tonic water   • Drug use: Not Currently     Types: Marijuana, Inhaled     Comment: medical marijuana through bong      Social History     Substance and Sexual Activity   Drug Use Not Currently   • Types: Marijuana, Inhaled    Comment: medical marijuana through bong       FAMILY HISTORY  Family History   Problem Relation Age of Onset   • Lung Disease Mother         copd   • Diabetes Father    • Heart Disease Father    • Diabetes Sister    • Cancer Maternal Aunt 40        breast       CURRENT MEDICATIONS  Home Medications    **Home medications have not yet been reviewed for this encounter**         ALLERGIES  Allergies   Allergen Reactions   • Azathioprine Sodium Hives and Shortness of Breath   • Infliximab Hives, Shortness of Breath and Rash     .   • Methotrexate Hives, Shortness of Breath and Rash     .   • Methotrexate Hives, Rash and Shortness of Breath     .   • Morphine Shortness of Breath, Swelling and Palpitations   • Promethazine Shortness of Breath and Rash     .   • Roxanol [Morphine Sulfate] Swelling     Throat swells   • Amitriptyline Unspecified     Nightmares     • Carafate [Sucralfate] Vomiting and Nausea     Generalizes/Mouth Sores   • Demerol Vomiting   • Duragesic [Fentanyl]      \"Patches didn't work\"  Skin broke down   • Fentanyl Unspecified     Patches caused skin breakdown, no pain relief   • Lorazepam Unspecified     States give me nightmares.    • Meperidine Vomiting   • Methadone Unspecified     Didn't work   • Methadone Unspecified     Didn't work   • Other Drug Unspecified     steroids   • Pregabalin Rash     Rash     • Pseudoephedrine Vomiting   • Sulfa Drugs " Hives and Rash     .  .   • Betamethasone Unspecified     Pt unable to remember   • Celestone [Betamethasone Sodium Phosphate] Unspecified     Pt unable to remember   • Sulfasalazine Rash     On chest       PHYSICAL EXAM  VITAL SIGNS: BP (!) 150/68   Pulse 88   Temp 36.2 °C (97.1 °F) (Oral)   Resp 18   Ht 1.829 m (6')   Wt 72.6 kg (160 lb)   SpO2 99%   BMI 21.70 kg/m²     General: Alert, mild acute distress  Skin: Warm, dry, generally pale, blanching urticarial rash noted generally around the upper extremities and dorsum of the hands, no petechiae/purpura  Head: Normocephalic, atraumatic  Neck: Trachea midline, no tenderness  Eye: PERRL, normal conjunctiva, sclera are anicteric  ENMT: Oral mucosa pink and dry, white plaques noted to cheeks and tongue concerning for thrush  Cardiovascular: Regular rate and rhythm, No murmur, Normal peripheral perfusion  Respiratory: Lungs CTA, respirations are non-labored, breath sounds are equal  Gastrointestinal: Soft, ostomy in place to the left lower quadrant.  Well-healed old surgical scars appreciated.  Bowel sounds are hypoactive.  Generally tender in all 4 quadrants, no guarding, no rebound, no rigidity.  Musculoskeletal: No swelling, no deformity  Neurological: Alert and oriented to person, place, time, and situation  Lymphatics: No lymphadenopathy  Psychiatric: Cooperative, anxious, pressured speech      DIAGNOSTIC STUDIES/PROCEDURES    LABS  Results for orders placed or performed during the hospital encounter of 05/23/22   CBC WITH DIFFERENTIAL   Result Value Ref Range    WBC 7.6 4.8 - 10.8 K/uL    RBC 4.17 (L) 4.20 - 5.40 M/uL    Hemoglobin 12.8 12.0 - 16.0 g/dL    Hematocrit 39.0 37.0 - 47.0 %    MCV 93.5 81.4 - 97.8 fL    MCH 30.7 27.0 - 33.0 pg    MCHC 32.8 (L) 33.6 - 35.0 g/dL    RDW 43.0 35.9 - 50.0 fL    Platelet Count 233 164 - 446 K/uL    MPV 9.7 9.0 - 12.9 fL    Neutrophils-Polys 66.90 44.00 - 72.00 %    Lymphocytes 22.00 22.00 - 41.00 %    Monocytes 8.70  0.00 - 13.40 %    Eosinophils 1.20 0.00 - 6.90 %    Basophils 0.80 0.00 - 1.80 %    Immature Granulocytes 0.40 0.00 - 0.90 %    Nucleated RBC 0.00 /100 WBC    Neutrophils (Absolute) 5.09 2.00 - 7.15 K/uL    Lymphs (Absolute) 1.67 1.00 - 4.80 K/uL    Monos (Absolute) 0.66 0.00 - 0.85 K/uL    Eos (Absolute) 0.09 0.00 - 0.51 K/uL    Baso (Absolute) 0.06 0.00 - 0.12 K/uL    Immature Granulocytes (abs) 0.03 0.00 - 0.11 K/uL    NRBC (Absolute) 0.00 K/uL   COMP METABOLIC PANEL   Result Value Ref Range    Sodium 144 135 - 145 mmol/L    Potassium 3.8 3.6 - 5.5 mmol/L    Chloride 107 96 - 112 mmol/L    Co2 22 20 - 33 mmol/L    Anion Gap 15.0 7.0 - 16.0    Glucose 84 65 - 99 mg/dL    Bun 12 8 - 22 mg/dL    Creatinine 1.03 0.50 - 1.40 mg/dL    Calcium 10.0 8.4 - 10.2 mg/dL    AST(SGOT) 30 12 - 45 U/L    ALT(SGPT) 27 2 - 50 U/L    Alkaline Phosphatase 106 (H) 30 - 99 U/L    Total Bilirubin 0.4 0.1 - 1.5 mg/dL    Albumin 4.6 3.2 - 4.9 g/dL    Total Protein 8.1 6.0 - 8.2 g/dL    Globulin 3.5 1.9 - 3.5 g/dL    A-G Ratio 1.3 g/dL   LIPASE   Result Value Ref Range    Lipase 179 (H) 7 - 58 U/L   TROPONIN   Result Value Ref Range    Troponin T 10 6 - 19 ng/L   APTT   Result Value Ref Range    APTT 28.3 24.7 - 36.0 sec   PROTHROMBIN TIME (INR)   Result Value Ref Range    PT 14.3 12.0 - 14.6 sec    INR 1.20 (H) 0.87 - 1.13   proBrain Natriuretic Peptide, NT   Result Value Ref Range    NT-proBNP 607 (H) 0 - 125 pg/mL   LACTIC ACID   Result Value Ref Range    Lactic Acid 1.2 0.5 - 2.0 mmol/L   ESTIMATED GFR   Result Value Ref Range    GFR (CKD-EPI) 59 (A) >60 mL/min/1.73 m 2   EKG (NOW)   Result Value Ref Range    Report       [facility] Emergency Dept.    Test Date:  2022  Pt Name:    YONATHAN JUDD                Department: [facility]  MRN:        [id number]                      [facility]  Gender:     Female                       Technician: [id number] ALLI  :        1955                   Requested  By:JESUS FARIAS JOSE  Order #:    [id number]                    Reading MD:    Measurements  Intervals                                Axis  Rate:       96                           P:          67  IA:         176                          QRS:        62  QRSD:       124                          T:          8  QT:         362  QTc:        458    Interpretive Statements  Sinus rhythm  Nonspecific intraventricular conduction delay  Borderline T abnormalities, anterior leads  Compared to ECG 05/20/2022 22:26:45  Intraventricular conduction delay now present  T-wave abnormality now present  Sinus tachycardia no longer present  Right ventricular hypertrophy no longer present       *Note: Due to a large number of results and/or encounters for the requested time period, some results have not been displayed. A complete set of results can be found in Results Review.     All labs reviewed by me, elevated lipase concerning for pancreatitis.    EKG  12 Lead EKG obtained at 0203 and interpreted by me to show:  Rhythm: Normal sinus rhythm   Rate: 96  Axis: Normal  Intervals: Normal  Q Waves: Normal  No diagnostic ST segment elevation    Clinical Impression: Normal EKG  Compared to May 20, 2022, no significant change    RADIOLOGY  CT-ABDOMEN-PELVIS WITH   Final Result         1.  No acute intra-abdominal abnormality   2.  Hepatomegaly   3.  Atherosclerotic        The radiologist's interpretation of all radiological studies have been reviewed by me.    COURSE & MEDICAL DECISION MAKING  Pertinent Labs & Imaging studies reviewed. (See chart for details)    1:57 AM - Patient seen and examined at bedside. Patient will be treated with Compazine, diphenhydramine, 1 L of crystalloid. Ordered metabolic work-up as well as CT imaging of the abdomen pelvis to evaluate her symptoms. The differential diagnoses include but are not limited to: Acute gastritis, pancreatitis, bowel obstruction, ileus, enteritis    0255: Patient still nauseous and  notes abdominal pain persistent despite the Compazine.  I am suspicious for perhaps gastritis given thankfully is no evidence of leukocytosis to the rest of her labs are pending at this time.  I have ordered Protonix and lorazepam for her abdominal pain and nausea.    0327: Still actively retching despite the Compazine, diphenhydramine, and lorazepam.  Labs are concerning for pancreatitis.  I ordered Zofran at this time.    HYDRATION: Based on the patient's presentation of Acute Vomiting and Dehydration the patient was given IV fluids. IV Hydration was used because oral hydration failed due to Unable to tolerate oral intake. Upon recheck following hydration, the patient was Doing somewhat better, heart rate improving.    Patient Vitals for the past 24 hrs:   BP Temp Temp src Pulse Resp SpO2 Height Weight   05/23/22 0112 -- -- -- -- -- -- -- 72.6 kg (160 lb)   05/23/22 0032 (!) 150/68 36.2 °C (97.1 °F) Oral 88 18 99 % 1.829 m (6') 72.6 kg (160 lb 0.9 oz)         Decision Making:  This is a 66 y.o. year old female who presents with persistent abdominal pain with nausea vomiting and inability to tolerate oral intake.  Patient seen at this facility and admitted on the 20th, she was discharged yesterday in the a.m. when she had been feeling somewhat better after IV fluids.  Patient again appears to be quite volume depleted with dry oral mucous membranes.  Thankfully she is nontachycardic, she has no leukocytosis, and no fever; doubt septicemia.  She did recently complete a steroid course for Crohn's disease; given the nausea and vomiting afterwards I suspect perhaps gastritis.  However given her significant pain and inability to tolerate oral intake CT imaging is indicated to evaluate for potential obstructive process given her multiple surgeries.  Imaging demonstrates thankfully no obstructive process.  Mild decrease in patient's GFR from previous levels consistent with volume depletion.  Lipase is more than 3 times the  upper limit of normal which is consistent with acute pancreatitis and certainly consistent with the patient's presentation.    Spoke with hospitalist who concurs with plan and accepts the patient to his service.    FINAL IMPRESSION  1. Acute pancreatitis, unspecified complication status, unspecified pancreatitis type    2. Intractable vomiting with nausea, unspecified vomiting type    3. Dehydration    4. Abdominal pain of multiple sites          Arnie DE LA CRUZ M.D. (Scribe), am scribing for, and in the presence of, Arnie Hillman MD.    Electronically signed by: Arnie Hillman M.D. (Scribe), 5/23/2022    IArnie MD personally performed the services described in this documentation, as scribed by Arnie iHllman M.D. in my presence, and it is both accurate and complete    The note accurately reflects work and decisions made by me.  Arnie Hillman M.D.  5/23/2022  3:45 AM

## 2022-05-23 NOTE — ED NOTES
Chief Complaint   Patient presents with   • N/V   • Sore Throat   • Weakness   • Insomnia     Pt to ED tonight after recent admission for dehydration. Pt was admitted to this facility for hydration d/t persistent N/V. Pt left early due to attending a .     Pt states she was feeling a little better after fluid hydration during admission but feels like she has 20 lbs of fluid on her as a result. She was given nystatin for thrush but additional doses were not taken due to script not being sent to pharmacy as she was told it would be. Due to the white sores in her mouth and difficulty swallowing she has not been able to take her ODT Zofran for N/V. She did try broth at home and was unable to hold it down as well. Pt states she feels as bad as she did before admission now.     Pt states she has been unable to sleep for days due to being uncomfortable and tonight started seeing floaters in the air that she knows are not there.     Pt is able to articulate the purpose of her visit but requires constant re-orientation to the purpose of today's visit.

## 2022-05-23 NOTE — FLOWSHEET NOTE
"Entered patient room to perform assessment and review admission questions and assessments.  Patient sleeping in bed but awakens easily and is alert. Patient refusing to allow this nurse to get vitals because she wants to stay \"covered\" with blankets. After education, patient allows this nurse to obtain vitals.  Attempted to ask patient questions but patient is irritable at this time. Patient responded with \"[place]\" when asked where she was. This nurse asked patient the date and her date of birth and patient stated \"I don't know\". This nurse asked patient if she is willing to take medication at this time. Patient sighed and pulled up blankets appearing to go back to sleep. Call light within reach.   "

## 2022-05-23 NOTE — ASSESSMENT & PLAN NOTE
- Causing pain but significant nausea and vomiting  -Start multiple antiemetics  RUQ US negative for ductal dilation, no ETOH history, TG normal  Cont pain management  ADAT

## 2022-05-23 NOTE — ASSESSMENT & PLAN NOTE
- Currently sinus rhythm on home metoprolol, continue  -Patient is not on full dose anticoagulation, will not start at this time, will use DVT prophylaxis dose of Xarelto

## 2022-05-23 NOTE — PROGRESS NOTES
"Patient admitted earlier by Dr. Warner.  For further details please review his H&P.        [facility] Medicine Daily Progress Note    Date of Service  5/23/2022    Chief Complaint  Jana Overton is a 66 y.o. female admitted 5/23/2022 with N/V    Hospital Course  As per chart review:  \"66 y.o. female who presented 5/23/2022 with nausea, vomiting and abdominal pain.  Patient states she became ill on Thursday, has had persistent nausea and vomiting, has not noted any blood.  She was recently admitted, discharged on 5/22.  Patient was admitted, diagnosed with a Crohn's flare at that time, improved and was discharged.  Patient potentially had some mental changes with the steroids, confusion documented.  Patient states she began having nausea and vomiting shortly after being discharged, unable to keep anything down.  She now complains of a sore throat.  She has generalized abdominal pain that can be severe at times.  She has received multiple medications for nausea and vomiting, is currently somnolent, most of the information obtained from the chart.\"     Interval Problem Update  5/23: Patient seen at bedside this morning.  Patient sleeping soundly, I tried to wake her up multiple times, however she did not want to be bothered.  She barely open her eyes and just wanted to sleep.  That does not seem that the patient is in acute pain at this time.  We are awaiting ultrasound of the abdomen to evaluate for possible pancreatitis.  We will continue to monitor closely.    I have personally seen and examined the patient at bedside. I discussed the plan of care with patient, bedside RN and charge RN.    Consultants/Specialty  None for now    Code Status  Full Code    Disposition  Patient is not medically cleared for discharge.   Anticipate discharge to pending clinical evolution      Review of Systems  Review of Systems   Unable to perform ROS: Other   Patient seems to be sleeping soundly, however unable to obtain review of " systems as the patient is not willing to participate at this time.    Physical Exam  Temp:  [36.2 °C (97.1 °F)-36.4 °C (97.6 °F)] 36.4 °C (97.5 °F)  Pulse:  [74-88] 85  Resp:  [16-18] 16  BP: (118-150)/(64-70) 118/64  SpO2:  [98 %-99 %] 98 %    Physical Exam  Unable to perform a physical exam at this time, as the patient is sleeping soundly, and does not want to participate with physical examination.      Fluids    Intake/Output Summary (Last 24 hours) at 5/23/2022 0933  Last data filed at 5/23/2022 0436  Gross per 24 hour   Intake 1000 ml   Output --   Net 1000 ml       Laboratory  Recent Labs     05/20/22 2225 05/22/22 0420 05/23/22 0236   WBC 13.5* 8.8 7.6   RBC 4.29 4.00* 4.17*   HEMOGLOBIN 13.4 12.4 12.8   HEMATOCRIT 40.3 38.0 39.0   MCV 93.9 95.0 93.5   MCH 31.2 31.0 30.7   MCHC 33.3* 32.6* 32.8*   RDW 41.7 43.0 43.0   PLATELETCT 261 244 233   MPV 9.9 10.1 9.7     Recent Labs     05/20/22 2225 05/22/22 0420 05/23/22 0236   SODIUM 139 143 144   POTASSIUM 3.5* 3.4* 3.8   CHLORIDE 101 111 107   CO2 17* 21 22   GLUCOSE 86 108* 84   BUN 17 8 12   CREATININE 1.17 0.98 1.03   CALCIUM 9.8 9.1 10.0     Recent Labs     05/23/22 0236   APTT 28.3   INR 1.20*         Recent Labs     05/23/22  0236   TRIGLYCERIDE 113   HDL 81   LDL 90       Imaging  CT-ABDOMEN-PELVIS WITH   Final Result         1.  No acute intra-abdominal abnormality   2.  Hepatomegaly   3.  Atherosclerotic      US-RUQ    (Results Pending)        Assessment/Plan  * Acute pancreatitis- (present on admission)  Assessment & Plan  - Causing pain but significant nausea and vomiting  -Start multiple antiemetics  -Causing dehydration, start IV fluids  -Associated with an elevated alk phos but a normal total bilirubin  -No history of significant alcohol use  -Obtain right upper quadrant ultrasound as well as a lipid panel  -Patient does require admission at this time, if she were not admitted, cryptitis would continue to worsen, eventually risking sepsis  and death  -I do anticipate patient requiring more than 2 days of hospitalization    FLORES (acute kidney injury) (HCC)- (present on admission)  Assessment & Plan  - Mild, due to dehydration  -Start IV fluids  -Repeat BMP in the morning    Essential hypertension- (present on admission)  Assessment & Plan  - Continue home metoprolol  -Start as needed hydralazine  -Adjust as needed    Paroxysmal atrial fibrillation (HCC)- (present on admission)  Assessment & Plan  - Currently sinus rhythm on home metoprolol, continue  -Patient is not on full dose anticoagulation, will not start at this time, will use DVT prophylaxis dose of Xarelto    COPD (chronic obstructive pulmonary disease) (HCC)- (present on admission)  Assessment & Plan  - No acute exacerbation    Chronic pain- (present on admission)  Assessment & Plan  - Continue oxycodone without increase at this time    Crohn's disease of small intestine with complication (HCC)- (present on admission)  Assessment & Plan  - Was recently treated for a flare with Medrol Dosepak, has received multiple days of steroid  -I am not going to continue the steroid at this time due to concern for steroid psychosis  -I do not think her current pain is a Crohn's flare         VTE prophylaxis: Xarelto 10 mg daily as prophylaxis    I have performed a physical exam and reviewed and updated ROS and Plan today (5/23/2022). In review of yesterday's note (5/22/2022), there are no changes except as documented above.

## 2022-05-23 NOTE — ED NOTES
Pt is a difficult IV access and usually requires Vascular Access team at other hospitals. Due to multiple recently accessed sites on arms up to AC. RN requested for US IV access and is currently with pt.

## 2022-05-24 LAB
ALBUMIN SERPL BCP-MCNC: 3.9 G/DL (ref 3.2–4.9)
ALBUMIN/GLOB SERPL: 1.3 G/DL
ALP SERPL-CCNC: 90 U/L (ref 30–99)
ALT SERPL-CCNC: 21 U/L (ref 2–50)
ANION GAP SERPL CALC-SCNC: 13 MMOL/L (ref 7–16)
AST SERPL-CCNC: 21 U/L (ref 12–45)
BILIRUB SERPL-MCNC: 0.5 MG/DL (ref 0.1–1.5)
BUN SERPL-MCNC: 12 MG/DL (ref 8–22)
CALCIUM SERPL-MCNC: 9 MG/DL (ref 8.4–10.2)
CHLORIDE SERPL-SCNC: 106 MMOL/L (ref 96–112)
CO2 SERPL-SCNC: 22 MMOL/L (ref 20–33)
CREAT SERPL-MCNC: 0.87 MG/DL (ref 0.5–1.4)
ERYTHROCYTE [DISTWIDTH] IN BLOOD BY AUTOMATED COUNT: 41.1 FL (ref 35.9–50)
GFR SERPLBLD CREATININE-BSD FMLA CKD-EPI: 72 ML/MIN/1.73 M 2
GLOBULIN SER CALC-MCNC: 2.9 G/DL (ref 1.9–3.5)
GLUCOSE SERPL-MCNC: 124 MG/DL (ref 65–99)
HCT VFR BLD AUTO: 36.3 % (ref 37–47)
HGB BLD-MCNC: 12.1 G/DL (ref 12–16)
MCH RBC QN AUTO: 30.9 PG (ref 27–33)
MCHC RBC AUTO-ENTMCNC: 33.3 G/DL (ref 33.6–35)
MCV RBC AUTO: 92.8 FL (ref 81.4–97.8)
PLATELET # BLD AUTO: 210 K/UL (ref 164–446)
PMV BLD AUTO: 10.1 FL (ref 9–12.9)
POTASSIUM SERPL-SCNC: 3.5 MMOL/L (ref 3.6–5.5)
PROT SERPL-MCNC: 6.8 G/DL (ref 6–8.2)
RBC # BLD AUTO: 3.91 M/UL (ref 4.2–5.4)
SODIUM SERPL-SCNC: 141 MMOL/L (ref 135–145)
WBC # BLD AUTO: 7.7 K/UL (ref 4.8–10.8)

## 2022-05-24 PROCEDURE — 85027 COMPLETE CBC AUTOMATED: CPT

## 2022-05-24 PROCEDURE — A9270 NON-COVERED ITEM OR SERVICE: HCPCS | Performed by: INTERNAL MEDICINE

## 2022-05-24 PROCEDURE — 700111 HCHG RX REV CODE 636 W/ 250 OVERRIDE (IP): Performed by: EMERGENCY MEDICINE

## 2022-05-24 PROCEDURE — 700102 HCHG RX REV CODE 250 W/ 637 OVERRIDE(OP): Performed by: HOSPITALIST

## 2022-05-24 PROCEDURE — A9270 NON-COVERED ITEM OR SERVICE: HCPCS | Performed by: HOSPITALIST

## 2022-05-24 PROCEDURE — 700111 HCHG RX REV CODE 636 W/ 250 OVERRIDE (IP): Performed by: INTERNAL MEDICINE

## 2022-05-24 PROCEDURE — 99233 SBSQ HOSP IP/OBS HIGH 50: CPT | Performed by: HOSPITALIST

## 2022-05-24 PROCEDURE — 97535 SELF CARE MNGMENT TRAINING: CPT

## 2022-05-24 PROCEDURE — 700102 HCHG RX REV CODE 250 W/ 637 OVERRIDE(OP): Performed by: INTERNAL MEDICINE

## 2022-05-24 PROCEDURE — 80053 COMPREHEN METABOLIC PANEL: CPT

## 2022-05-24 PROCEDURE — 700101 HCHG RX REV CODE 250: Performed by: INTERNAL MEDICINE

## 2022-05-24 PROCEDURE — 36415 COLL VENOUS BLD VENIPUNCTURE: CPT

## 2022-05-24 PROCEDURE — 770006 HCHG ROOM/CARE - MED/SURG/GYN SEMI*

## 2022-05-24 PROCEDURE — 97165 OT EVAL LOW COMPLEX 30 MIN: CPT

## 2022-05-24 RX ORDER — POTASSIUM CHLORIDE 20 MEQ/1
40 TABLET, EXTENDED RELEASE ORAL ONCE
Status: COMPLETED | OUTPATIENT
Start: 2022-05-24 | End: 2022-05-24

## 2022-05-24 RX ORDER — SPIRONOLACTONE 25 MG/1
25 TABLET ORAL
Status: DISCONTINUED | OUTPATIENT
Start: 2022-05-24 | End: 2022-05-25 | Stop reason: HOSPADM

## 2022-05-24 RX ADMIN — ONDANSETRON 4 MG: 2 INJECTION INTRAMUSCULAR; INTRAVENOUS at 21:10

## 2022-05-24 RX ADMIN — METOPROLOL TARTRATE 50 MG: 50 TABLET, FILM COATED ORAL at 05:02

## 2022-05-24 RX ADMIN — ONDANSETRON HYDROCHLORIDE 4 MG: 2 SOLUTION INTRAMUSCULAR; INTRAVENOUS at 01:22

## 2022-05-24 RX ADMIN — NYSTATIN 500000 UNITS: 100000 SUSPENSION ORAL at 17:21

## 2022-05-24 RX ADMIN — NYSTATIN 500000 UNITS: 100000 SUSPENSION ORAL at 12:13

## 2022-05-24 RX ADMIN — OXYCODONE HYDROCHLORIDE 10 MG: 10 TABLET ORAL at 17:21

## 2022-05-24 RX ADMIN — ONDANSETRON 4 MG: 2 INJECTION INTRAMUSCULAR; INTRAVENOUS at 17:21

## 2022-05-24 RX ADMIN — TRAZODONE HYDROCHLORIDE 50 MG: 50 TABLET ORAL at 20:51

## 2022-05-24 RX ADMIN — RIVAROXABAN 10 MG: 10 TABLET, FILM COATED ORAL at 05:02

## 2022-05-24 RX ADMIN — ONDANSETRON 4 MG: 2 INJECTION INTRAMUSCULAR; INTRAVENOUS at 11:57

## 2022-05-24 RX ADMIN — NYSTATIN 500000 UNITS: 100000 SUSPENSION ORAL at 08:05

## 2022-05-24 RX ADMIN — POTASSIUM CHLORIDE 40 MEQ: 20 TABLET, EXTENDED RELEASE ORAL at 08:59

## 2022-05-24 RX ADMIN — ONDANSETRON 4 MG: 2 INJECTION INTRAMUSCULAR; INTRAVENOUS at 01:23

## 2022-05-24 RX ADMIN — METOPROLOL TARTRATE 50 MG: 50 TABLET, FILM COATED ORAL at 17:22

## 2022-05-24 RX ADMIN — OXYCODONE HYDROCHLORIDE 10 MG: 10 TABLET ORAL at 07:34

## 2022-05-24 RX ADMIN — LORAZEPAM 0.5 MG: 2 INJECTION INTRAMUSCULAR; INTRAVENOUS at 21:09

## 2022-05-24 RX ADMIN — LORAZEPAM 0.5 MG: 2 INJECTION INTRAMUSCULAR; INTRAVENOUS at 02:55

## 2022-05-24 RX ADMIN — OXYCODONE HYDROCHLORIDE 10 MG: 10 TABLET ORAL at 11:18

## 2022-05-24 RX ADMIN — ONDANSETRON 4 MG: 2 INJECTION INTRAMUSCULAR; INTRAVENOUS at 08:05

## 2022-05-24 RX ADMIN — OXYCODONE HYDROCHLORIDE 10 MG: 10 TABLET ORAL at 00:48

## 2022-05-24 RX ADMIN — NYSTATIN 500000 UNITS: 100000 SUSPENSION ORAL at 20:51

## 2022-05-24 RX ADMIN — SPIRONOLACTONE 25 MG: 25 TABLET ORAL at 13:52

## 2022-05-24 RX ADMIN — POTASSIUM CHLORIDE AND SODIUM CHLORIDE: 900; 150 INJECTION, SOLUTION INTRAVENOUS at 04:17

## 2022-05-24 ASSESSMENT — ENCOUNTER SYMPTOMS
PALPITATIONS: 0
CHILLS: 0
FEVER: 0
HEADACHES: 0
BACK PAIN: 1
NAUSEA: 1
SHORTNESS OF BREATH: 0
COUGH: 0
DIZZINESS: 0
ABDOMINAL PAIN: 1
VOMITING: 1

## 2022-05-24 ASSESSMENT — GAIT ASSESSMENTS: DISTANCE (FEET): 200

## 2022-05-24 ASSESSMENT — COGNITIVE AND FUNCTIONAL STATUS - GENERAL
SUGGESTED CMS G CODE MODIFIER DAILY ACTIVITY: CJ
DAILY ACTIVITIY SCORE: 22
DRESSING REGULAR LOWER BODY CLOTHING: A LITTLE
HELP NEEDED FOR BATHING: A LITTLE

## 2022-05-24 ASSESSMENT — ACTIVITIES OF DAILY LIVING (ADL): TOILETING: INDEPENDENT

## 2022-05-24 ASSESSMENT — PAIN DESCRIPTION - PAIN TYPE
TYPE: ACUTE PAIN
TYPE: ACUTE PAIN

## 2022-05-24 NOTE — PROGRESS NOTES
"Patient admitted earlier by Dr. Warner.  For further details please review his H&P.        [facility] Medicine Daily Progress Note    Date of Service  5/24/2022    Chief Complaint  Jana Overton is a 66 y.o. female admitted 5/23/2022 with N/V    Hospital Course  As per chart review:  \"66 y.o. female who presented 5/23/2022 with nausea, vomiting and abdominal pain.  Patient states she became ill on Thursday, has had persistent nausea and vomiting, has not noted any blood.  She was recently admitted, discharged on 5/22.  Patient was admitted, diagnosed with a Crohn's flare at that time, improved and was discharged.  Patient potentially had some mental changes with the steroids, confusion documented.  Patient states she began having nausea and vomiting shortly after being discharged, unable to keep anything down.  She now complains of a sore throat.  She has generalized abdominal pain that can be severe at times.  She has received multiple medications for nausea and vomiting, is currently somnolent, most of the information obtained from the chart.\"     Interval Problem Update  No acute events overnight  Still having epigastric abdominal pain  She ate seems to be a bit forgetful because she states she has been here for 5 days and no one has seen her when she was just admitted yesterday  She has a very particular diet given her Crohn's she states even though she was able to eat food last night it she vomited afterwards  Her  has brought her food that she generally eats    I have personally seen and examined the patient at bedside. I discussed the plan of care with patient, family, bedside RN, charge RN,  and pharmacy.    Consultants/Specialty  None     Code Status  Full Code    Disposition  Patient is not medically cleared for discharge.   Anticipate discharge home      Review of Systems  Review of Systems   Constitutional: Negative for chills and fever.   Respiratory: Negative for cough and shortness " of breath.    Cardiovascular: Negative for chest pain and palpitations.   Gastrointestinal: Positive for abdominal pain, nausea and vomiting.   Genitourinary: Negative for dysuria and urgency.   Musculoskeletal: Positive for back pain and joint pain.   Neurological: Negative for dizziness and headaches.   All other systems reviewed and are negative.  Patient seems to be sleeping soundly, however unable to obtain review of systems as the patient is not willing to participate at this time.    Physical Exam  Temp:  [36.4 °C (97.5 °F)-37.4 °C (99.4 °F)] 36.4 °C (97.5 °F)  Pulse:  [] 81  Resp:  [17-18] 18  BP: (144-158)/(69-99) 145/80  SpO2:  [95 %-98 %] 98 %    Physical Exam  Vitals and nursing note reviewed.   Constitutional:       Appearance: Normal appearance.   Cardiovascular:      Rate and Rhythm: Normal rate and regular rhythm.   Pulmonary:      Effort: Pulmonary effort is normal.      Breath sounds: Normal breath sounds.   Abdominal:      Tenderness: There is abdominal tenderness.      Comments: LLQ ostomy +   Skin:     General: Skin is warm and dry.   Neurological:      General: No focal deficit present.      Mental Status: She is alert and oriented to person, place, and time.       Fluids    Intake/Output Summary (Last 24 hours) at 5/24/2022 1232  Last data filed at 5/24/2022 0854  Gross per 24 hour   Intake 1020 ml   Output --   Net 1020 ml       Laboratory  Recent Labs     05/22/22  0420 05/23/22  0236 05/24/22  0209   WBC 8.8 7.6 7.7   RBC 4.00* 4.17* 3.91*   HEMOGLOBIN 12.4 12.8 12.1   HEMATOCRIT 38.0 39.0 36.3*   MCV 95.0 93.5 92.8   MCH 31.0 30.7 30.9   MCHC 32.6* 32.8* 33.3*   RDW 43.0 43.0 41.1   PLATELETCT 244 233 210   MPV 10.1 9.7 10.1     Recent Labs     05/22/22  0420 05/23/22  0236 05/24/22  0209   SODIUM 143 144 141   POTASSIUM 3.4* 3.8 3.5*   CHLORIDE 111 107 106   CO2 21 22 22   GLUCOSE 108* 84 124*   BUN 8 12 12   CREATININE 0.98 1.03 0.87   CALCIUM 9.1 10.0 9.0     Recent Labs      05/23/22  0236   APTT 28.3   INR 1.20*         Recent Labs     05/23/22  0236   TRIGLYCERIDE 113   HDL 81   LDL 90       Imaging  US-RUQ   Final Result      No evidence of gallstone or evidence of biliary ductal dilatation.      CT-ABDOMEN-PELVIS WITH   Final Result         1.  No acute intra-abdominal abnormality   2.  Hepatomegaly   3.  Atherosclerotic           Assessment/Plan  * Acute pancreatitis- (present on admission)  Assessment & Plan  - Causing pain but significant nausea and vomiting  -Start multiple antiemetics  RUQ US negative for ductal dilation, no ETOH history, TG normal  Cont pain management  ADAT    FLORES (acute kidney injury) (HCC)- (present on admission)  Assessment & Plan  resolved    Essential hypertension- (present on admission)  Assessment & Plan  - Continue home metoprolol  -Start as needed hydralazine  -Adjust as needed    Paroxysmal atrial fibrillation (HCC)- (present on admission)  Assessment & Plan  - Currently sinus rhythm on home metoprolol, continue  -Patient is not on full dose anticoagulation, will not start at this time, will use DVT prophylaxis dose of Xarelto    COPD (chronic obstructive pulmonary disease) (HCC)- (present on admission)  Assessment & Plan  - No acute exacerbation    Chronic pain- (present on admission)  Assessment & Plan  - Continue oxycodone without increase at this time    Crohn's disease of small intestine with complication (HCC)- (present on admission)  Assessment & Plan  - Was recently treated for a flare with Medrol Dosepak, has received multiple days of steroid  -I am not going to continue the steroid at this time due to concern for steroid psychosis  -I do not think her current pain is a Crohn's flare       VTE prophylaxis: Xarelto 10 mg daily as prophylaxis    I have performed a physical exam and reviewed and updated ROS and Plan today (5/24/2022). In review of yesterday's note (5/23/2022), there are no changes except as documented above.

## 2022-05-24 NOTE — RESPIRATORY CARE
"   COPD EDUCATION by COPD CLINICAL EDUCATOR  5/23/2022 at 5:06 PM by Georgette Gao RRT     Patient reviewed by COPD education team. Patient does have a history or diagnosis of COPD and is a smoker (marijuana for pain), information on marijuana given.  Provided a short intervention completed with this patient covering: What is COPD (how the lungs work), daily medications rescue and maintenance, breathing techniques, infection prevention and oxygen safety were covered in detail.  A comprehensive packet including information about COPD, treatments, and oxygen safety was given.           COPD Screen  COPD Risk Screening  Do you have a history of COPD?: Yes  Do you have a Pulmonologist?: Yes    COPD Assessment  COPD Clinical Specialists ONLY  COPD Education Initiated: Yes--Short Intervention  DME Company: None  Physician Follow Up Appointment: 06/16/22  Appt Time: 1545  Physician Name: Chase Charles D.O - has appointment July 7th.  Pulmonologist Name:   Referrals Initiated: Yes  Pulmonary Rehab: N/A  Smoking Cessation: N/A  Hospice: No  Home Health Care: N/A  [facility] Outreach: N/A  [facility]: N/A  [facility]: No  Private In-Home Care Agency: N/A  Is this a COPD exacerbation patient?: No  (OP) Pulmonary Function Testing: Yes (pt to do OP follow up with Dr. Tinoco)    PFT Results    No results found for: PFT    Meds to Beds        MY COPD ACTION PLAN     It is recommended that patients and physicians /healthcare providers complete this action plan together. This plan should be discussed at each physician visit and updated as needed.    The green, yellow and red zones show groups of symptoms of COPD. This list of symptoms is not comprehensive, and you may experience other symptoms. In the \"Actions\" column, your healthcare provider has recommended actions for you to take based on your symptoms.    Patient Name: Jana Overton   YOB: 1955   Last Updated on:  " "   Green Zone:  I am doing well today Actions   •  Usual activitiy and exercise level •  Take daily medications   •  Usual amounts of cough and phlegm/mucus •  Use oxygen as prescribed   •  Sleep well at night •  Continue regular exercise/diet plan   •  Appetite is good •  At all times avoid cigarette smoke, inhaled irritants     Daily Medications (these medications are taken every day):                Yellow Zone:  I am having a bad day or a COPD flare Actions   •  More breathless than usual •  Continue daily medications   •  I have less energy for my daily activities •  Use quick relief inhaler as ordered   •  Increased or thicker phlegm/mucus •  Use oxygen as prescribed   •  Using quick relief inhaler/nebulizer more often •  Get plenty of rest   •  Swelling of ankles more than usual •  Use pursed lip breathing   •  More coughing than usual •  At all times avoid cigarette smoke, inhaled irritants   •  I feel like I have a \"chest cold\"   •  Poor sleep and my symptoms woke me up   •  My appetite is not good   •  My medicine is not helping    •  Call provider immediately if symptoms don’t improve     Continue daily medications, add rescue medications:               Medications to be used during a flare up, (as Discussed with Provider):              Red Zone:  I need urgent medical care Actions   •  Severe shortness of breath even at rest •  Call 911 or seek medical care immediately   •  Not able to do any activity because of breathing    •  Fever or shaking chills    •  Feeling confused or very drowsy     •  Chest pains    •  Coughing up blood                "

## 2022-05-24 NOTE — CARE PLAN
The patient is Stable - Low risk of patient condition declining or worsening    Shift Goals  Clinical Goals: IVF, monitor labs  Patient Goals: speak with MD    Progress made toward(s) clinical / shift goals:    Problem: Pain - Standard  Goal: Alleviation of pain or a reduction in pain to the patient’s comfort goal  Outcome: Progressing  PRN pain meds per emar    Problem: Knowledge Deficit - Standard  Goal: Patient and family/care givers will demonstrate understanding of plan of care, disease process/condition, diagnostic tests and medications  Outcome: Progressing   Education provided    Patient is not progressing towards the following goals:

## 2022-05-24 NOTE — CARE PLAN
The patient is Stable - Low risk of patient condition declining or worsening    Shift Goals  Clinical Goals: Rehydrate with IV fluids  Patient Goals: Rest and sleep    Progress made toward(s) clinical / shift goals:    Problem: Pain - Standard  Goal: Alleviation of pain or a reduction in pain to the patient’s comfort goal  Outcome: Progressing  Note: Pain managed per MAR     Problem: Knowledge Deficit - Standard  Goal: Patient and family/care givers will demonstrate understanding of plan of care, disease process/condition, diagnostic tests and medications  Outcome: Progressing  Note: Reviewed POC with patient, denies questions at this time       Patient is not progressing towards the following goals:

## 2022-05-24 NOTE — PROGRESS NOTES
Spiritual Care Note         Patient's Name: Jana Overton   MRN: [id number]    YOB: 1955   Age and Gender: 66 y.o. female   Unit/Service Area[place] [facility] [facility]   Room (and Bed): [facility]2/2   Ethnicity or Nationality: white   Primary Language: English   Zoroastrian/Spiritual Preference: Latter-day   Place of Residence: [place], [place]   Family/Friends/Others Present: , Goyo   Medical Diagnoses: acute pancreatitis  FLORES   essential hypertension  paroxysmal atrial fibrillation  COPD  chronic pain  Crohn's disease of small intestine w/ complication    Code Status: Full Code    Date of Admission: 5/23/2022   Length of Stay: 1 day        Nature of the Visit:  Initial, On Shift    General Visit:  Yes    Referral from/Origin of Request:  Epic, nursing order    Visited with:  Patient and     Observations/Symptoms:  Sitting up in bed, alert, pleasant.    Interaction/Conversation:  Patient rquested prayer for herself but also for the world situation, particularly [facility].    Assessment:  Need, Distress    Need:  Seeking Spiritual Assistance and Support    Distress:  Anxiety about the Future    Zoroastrian Needs:  Prayer    Intended Effects:  Demonstrate Caring and Concern, Establish Rapport and Connectedness, Shital Affirmation, Helping Someone Feel Comforted    Interventions:  Compassionate Presence, Reflective Listening, Emotional Support, Prayer    Outcomes:  Connectedness with God, Progress with Trust, Spiritual Comfort    Plan:  Visit Upon Request    Total Time:  20 minutes      Spiritual Care Provider   ravi Fischer  [phone]   [email or web address]

## 2022-05-24 NOTE — DISCHARGE INSTRUCTIONS
Discharge Instructions    Discharged to home by car with relative. Discharged via walking, hospital escort: Yes.  Special equipment needed: Not Applicable    Be sure to schedule a follow-up appointment with your primary care doctor or any specialists as instructed.     Discharge Plan:   Diet Plan: Discussed  Activity Level: Discussed  Confirmed Follow up Appointment: Patient to Call and Schedule Appointment  Confirmed Symptoms Management: Discussed  Medication Reconciliation Updated: Yes    I understand that a diet low in cholesterol, fat, and sodium is recommended for good health. Unless I have been given specific instructions below for another diet, I accept this instruction as my diet prescription.       Special Instructions: None    Is patient discharged on Warfarin / Coumadin?   No     Depression / Suicide Risk    As you are discharged from this [facility] facility, it is important to learn how to keep safe from harming yourself.    Recognize the warning signs:  Abrupt changes in personality, positive or negative- including increase in energy   Giving away possessions  Change in eating patterns- significant weight changes-  positive or negative  Change in sleeping patterns- unable to sleep or sleeping all the time   Unwillingness or inability to communicate  Depression  Unusual sadness, discouragement and loneliness  Talk of wanting to die  Neglect of personal appearance   Rebelliousness- reckless behavior  Withdrawal from people/activities they love  Confusion- inability to concentrate     If you or a loved one observes any of these behaviors or has concerns about self-harm, here's what you can do:  Talk about it- your feelings and reasons for harming yourself  Remove any means that you might use to hurt yourself (examples: pills, rope, extension cords, firearm)  Get professional help from the community (Mental Health, Substance Abuse, psychological counseling)  Do not be alone:Call your Safe Contact-  someone whom you trust who will be there for you.  Call your local CRISIS HOTLINE 143-1588 or 963-976-2692  Call your local Children's Mobile Crisis Response Team [place] (811) 589-7647 or [employer]  Call the toll free National Suicide Prevention Hotlines   National Suicide Prevention Lifeline 935-578-JSVX (1624)  [facility] 800-SUICIDE (832-7157)    Acute Pancreatitis    Acute pancreatitis happens when the pancreas gets swollen. The pancreas is a large gland in the body that helps to control blood sugar. It also makes enzymes that help to digest food.  This condition can last a few days and cause serious problems. The lungs, heart, and kidneys may stop working.  What are the causes?  Causes include:  Alcohol abuse.  Drug abuse.  Gallstones.  A tumor in the pancreas.  Other causes include:  Some medicines.  Some chemicals.  Diabetes.  An infection.  Damage caused by an accident.  The poison (venom) from a scorpion bite.  Belly (abdominal) surgery.  The body's defense system (immune system) attacking the pancreas (autoimmune pancreatitis).  Genes that are passed from parent to child (inherited).  In some cases, the cause is not known.  What are the signs or symptoms?  Pain in the upper belly that may be felt in the back. The pain may be very bad.  Swelling of the belly.  Feeling sick to your stomach (nauseous) and throwing up (vomiting).  Fever.  How is this treated?  You will likely have to stay in the hospital. Treatment may include:  Pain medicine.  Fluid through an IV tube.  Placing a tube in the stomach to take out the stomach contents. This may help you stop throwing up.  Not eating for 3-4 days.  Antibiotic medicines, if you have an infection.  Treating any other problems that may be the cause.  Steroid medicines, if your problem is caused by your defense system attacking your body's own tissues.  Surgery.  Follow these instructions at home:  Eating and drinking    Follow  instructions from your doctor about what to eat and drink.  Eat foods that do not have a lot of fat in them.  Eat small meals often. Do not eat big meals.  Drink enough fluid to keep your pee (urine) pale yellow.  Do not drink alcohol if it caused your condition.  Medicines  Take over-the-counter and prescription medicines only as told by your doctor.  Ask your doctor if the medicine prescribed to you:  Requires you to avoid driving or using heavy machinery.  Can cause trouble pooping (constipation). You may need to take steps to prevent or treat trouble pooping:  Take over-the-counter or prescription medicines.  Eat foods that are high in fiber. These include beans, whole grains, and fresh fruits and vegetables.  Limit foods that are high in fat and sugar. These include fried or sweet foods.  General instructions  Do not use any products that contain nicotine or tobacco, such as cigarettes, e-cigarettes, and chewing tobacco. If you need help quitting, ask your doctor.  Get plenty of rest.  Check your blood sugar at home as told by your doctor.  Keep all follow-up visits as told by your doctor. This is important.  Contact a doctor if:  You do not get better as quickly as expected.  You have new symptoms.  Your symptoms get worse.  You have pain or weakness that lasts a long time.  You keep feeling sick to your stomach.  You get better and then you have pain again.  You have a fever.  Get help right away if:  You cannot eat or keep fluids down.  Your pain gets very bad.  Your skin or the white part of your eyes turns yellow.  You have sudden swelling in your belly.  You throw up.  You feel dizzy or you pass out (faint).  Your blood sugar is high (over 300 mg/dL).  Summary  Acute pancreatitis happens when the pancreas gets swollen.  This condition is often caused by alcohol abuse, drug abuse, or gallstones.  You will likely have to stay in the hospital for treatment.  This information is not intended to replace advice  given to you by your health care provider. Make sure you discuss any questions you have with your health care provider.  Document Released: 06/05/2009 Document Revised: 10/07/2019 Document Reviewed: 10/07/2019  Elsevier Patient Education © 2020 [employer]

## 2022-05-25 ENCOUNTER — PATIENT MESSAGE (OUTPATIENT)
Dept: MEDICAL GROUP | Facility: LAB | Age: 67
End: 2022-05-25

## 2022-05-25 ENCOUNTER — TELEPHONE (OUTPATIENT)
Dept: CARDIOLOGY | Facility: MEDICAL CENTER | Age: 67
End: 2022-05-25

## 2022-05-25 ENCOUNTER — TELEPHONE (OUTPATIENT)
Dept: CARDIOLOGY | Facility: MEDICAL CENTER | Age: 67
End: 2022-05-25
Payer: MEDICARE

## 2022-05-25 VITALS
HEIGHT: 72 IN | BODY MASS INDEX: 21.67 KG/M2 | OXYGEN SATURATION: 90 % | RESPIRATION RATE: 18 BRPM | SYSTOLIC BLOOD PRESSURE: 181 MMHG | HEART RATE: 122 BPM | TEMPERATURE: 97.2 F | DIASTOLIC BLOOD PRESSURE: 91 MMHG | WEIGHT: 160 LBS

## 2022-05-25 DIAGNOSIS — E55.9 VITAMIN D DEFICIENCY: ICD-10-CM

## 2022-05-25 DIAGNOSIS — Z13.29 SCREENING FOR THYROID DISORDER: ICD-10-CM

## 2022-05-25 PROCEDURE — 700102 HCHG RX REV CODE 250 W/ 637 OVERRIDE(OP): Performed by: HOSPITALIST

## 2022-05-25 PROCEDURE — A9270 NON-COVERED ITEM OR SERVICE: HCPCS | Performed by: HOSPITALIST

## 2022-05-25 PROCEDURE — 700102 HCHG RX REV CODE 250 W/ 637 OVERRIDE(OP): Performed by: INTERNAL MEDICINE

## 2022-05-25 PROCEDURE — A9270 NON-COVERED ITEM OR SERVICE: HCPCS | Performed by: INTERNAL MEDICINE

## 2022-05-25 PROCEDURE — 700111 HCHG RX REV CODE 636 W/ 250 OVERRIDE (IP): Performed by: HOSPITALIST

## 2022-05-25 PROCEDURE — 99239 HOSP IP/OBS DSCHRG MGMT >30: CPT | Performed by: HOSPITALIST

## 2022-05-25 PROCEDURE — 700111 HCHG RX REV CODE 636 W/ 250 OVERRIDE (IP): Performed by: INTERNAL MEDICINE

## 2022-05-25 RX ORDER — ONDANSETRON 4 MG/1
4 TABLET, ORALLY DISINTEGRATING ORAL EVERY 6 HOURS PRN
Qty: 20 TABLET | Refills: 0 | Status: SHIPPED | OUTPATIENT
Start: 2022-05-25 | End: 2022-11-08 | Stop reason: SDUPTHER

## 2022-05-25 RX ORDER — HYDROMORPHONE HYDROCHLORIDE 1 MG/ML
1 INJECTION, SOLUTION INTRAMUSCULAR; INTRAVENOUS; SUBCUTANEOUS ONCE
Status: COMPLETED | OUTPATIENT
Start: 2022-05-25 | End: 2022-05-25

## 2022-05-25 RX ADMIN — ONDANSETRON 4 MG: 4 TABLET, ORALLY DISINTEGRATING ORAL at 15:23

## 2022-05-25 RX ADMIN — SPIRONOLACTONE 25 MG: 25 TABLET ORAL at 10:42

## 2022-05-25 RX ADMIN — METOPROLOL TARTRATE 50 MG: 50 TABLET, FILM COATED ORAL at 10:42

## 2022-05-25 RX ADMIN — LORAZEPAM 0.5 MG: 2 INJECTION INTRAMUSCULAR; INTRAVENOUS at 02:01

## 2022-05-25 RX ADMIN — OXYCODONE HYDROCHLORIDE 10 MG: 10 TABLET ORAL at 09:38

## 2022-05-25 RX ADMIN — HYDROMORPHONE HYDROCHLORIDE 1 MG: 1 INJECTION, SOLUTION INTRAMUSCULAR; INTRAVENOUS; SUBCUTANEOUS at 13:00

## 2022-05-25 RX ADMIN — LIDOCAINE HYDROCHLORIDE 5 ML: 20 SOLUTION ORAL; TOPICAL at 09:38

## 2022-05-25 RX ADMIN — ONDANSETRON 4 MG: 2 INJECTION INTRAMUSCULAR; INTRAVENOUS at 02:09

## 2022-05-25 RX ADMIN — NYSTATIN 500000 UNITS: 100000 SUSPENSION ORAL at 08:38

## 2022-05-25 RX ADMIN — LIDOCAINE HYDROCHLORIDE 5 ML: 20 SOLUTION ORAL; TOPICAL at 15:24

## 2022-05-25 RX ADMIN — OXYCODONE HYDROCHLORIDE 10 MG: 10 TABLET ORAL at 15:23

## 2022-05-25 RX ADMIN — NYSTATIN 500000 UNITS: 100000 SUSPENSION ORAL at 13:00

## 2022-05-25 ASSESSMENT — PATIENT HEALTH QUESTIONNAIRE - PHQ9
1. LITTLE INTEREST OR PLEASURE IN DOING THINGS: NOT AT ALL
2. FEELING DOWN, DEPRESSED, IRRITABLE, OR HOPELESS: NOT AT ALL
SUM OF ALL RESPONSES TO PHQ9 QUESTIONS 1 AND 2: 0

## 2022-05-25 ASSESSMENT — LIFESTYLE VARIABLES
TOTAL SCORE: 0
CONSUMPTION TOTAL: NEGATIVE
HAVE YOU EVER FELT YOU SHOULD CUT DOWN ON YOUR DRINKING: NO
TOTAL SCORE: 0
EVER FELT BAD OR GUILTY ABOUT YOUR DRINKING: NO
HAVE PEOPLE ANNOYED YOU BY CRITICIZING YOUR DRINKING: NO
EVER HAD A DRINK FIRST THING IN THE MORNING TO STEADY YOUR NERVES TO GET RID OF A HANGOVER: NO
AVERAGE NUMBER OF DAYS PER WEEK YOU HAVE A DRINK CONTAINING ALCOHOL: 0
ALCOHOL_USE: NO
HOW MANY TIMES IN THE PAST YEAR HAVE YOU HAD 5 OR MORE DRINKS IN A DAY: 0
ON A TYPICAL DAY WHEN YOU DRINK ALCOHOL HOW MANY DRINKS DO YOU HAVE: 0
TOTAL SCORE: 0

## 2022-05-25 ASSESSMENT — PAIN DESCRIPTION - PAIN TYPE: TYPE: ACUTE PAIN

## 2022-05-25 NOTE — THERAPY
"Occupational Therapy   Initial Evaluation     Patient Name: Jana Overton  Age:  66 y.o., Sex:  female  Medical Record #: [id number]  Today's Date: 5/24/2022       Assessment    Patient is 66 y.o. female with a diagnosis of acute pancreatitis, Crohn's disease. Additional factors influencing patient status / progress: FLORES, HTN, A-fib, COPD. Pt lives with supportive , and states she is very active, I with ADLs, receives some assistance with IADLs, and intermittently using ADs (SPC or FWW) at home, depending on her energy level. Pt was referred to OT to assess safety with ADLs and functional mob, was provided treatment (Self-care mgt) for education re: edema mgt techniques, proper positioning and safety with step negotiation, exercises of B UE/LE for functional strength maintenance, and use of ankle brace for increased stability if needed. Pt recalled all techniques well, and was able to return demonstrate with good accuracy. Pt states she is planning to consult with her PT friend re: use of orthotics. No further acute OT needs at this time.    Plan    Recommend Occupational Therapy for evaluation and 1 treatment only.    DC Equipment Recommendations: None  Discharge Recommendations: Anticipate that the patient will have no further occupational therapy needs after discharge from the hospital     Subjective    \"Thank you so much... I feel much safer now.\"     Objective       05/24/22 1636   Prior Living Situation   Prior Services None   Housing / Facility 1 Story House   Steps Into Home 3   Steps In Home 0   Bathroom Set up Walk In Shower   Equipment Owned Front-Wheel Walker;Single Point Cane   Lives with - Patient's Self Care Capacity Spouse   Comments Pt lives with supportive spouse, Goyo   Prior Level of ADL Function   Self Feeding Independent   Grooming / Hygiene Independent   Bathing Independent   Dressing Independent   Toileting Independent   Prior Level of IADL Function   Medication Management Independent "   Laundry Independent   Kitchen Mobility Independent   Finances Independent   Home Management Requires Assist   Shopping Requires Assist   Prior Level Of Mobility Independent Without Device in Home;Independent Without Device in Community   Driving / Transportation Relatives / Others Provide Transportation   Occupation (Pre-Hospital Vocational) Retired Due To Age   Comments intermittent use of AD depending on energy level   History of Falls   History of Falls No   Pain   Pain Scales 0 to 10 Scale    Intervention Repositioned;Rest;Ambulation / Increased Activity   Non Verbal Scale  Calm   Pain 0 - 10 Group   Location Leg   Location Orientation Anterior;Posterior;Right   Pain Rating Scale (NPRS) 4   Description Aching   Comfort Goal Comfort with Movement;Sleep Comfortably   Therapist Pain Assessment Post Activity Pain Same as Prior to Activity   Cognition    Cognition / Consciousness WDL   Level of Consciousness Alert   Comments pleasant, verbose, cooperative   Passive ROM Upper Body   Passive ROM Upper Body WDL   Active ROM Upper Body   Active ROM Upper Body  WDL   Dominant Hand Right   Strength Upper Body   Upper Body Strength  WDL   Balance Assessment   Sitting Balance (Static) Good   Sitting Balance (Dynamic) Good   Standing Balance (Static) Good   Standing Balance (Dynamic) Good   Weight Shift Sitting Good   Weight Shift Standing Good   Comments with FWW   Bed Mobility    Supine to Sit Independent   Sit to Supine Independent   Scooting Independent   Rolling Independent   ADL Assessment   Grooming Standing;Modified Independent   Lower Body Dressing Supervision   Toileting Independent   How much help from another person does the patient currently need...   Putting on and taking off regular lower body clothing? 3   Bathing (including washing, rinsing, and drying)? 3   Toileting, which includes using a toilet, bedpan, or urinal? 4   Putting on and taking off regular upper body clothing? 4   Taking care of personal  grooming such as brushing teeth? 4   Eating meals? 4   6 Clicks Daily Activity Score 22   Functional Mobility   Sit to Stand Modified Independent   Bed, Chair, Wheelchair Transfer Modified Independent   Transfer Method Stand Step   Mobility with FWW   Distance (Feet) 200   # of Times Distance was Traveled 1   Activity Tolerance   Sitting in Chair functional   Sitting Edge of Bed functional   Standing functional   Education Group   Education Provided Role of Occupational Therapist;Home Safety   Role of Occupational Therapist Patient Response Patient;Explanation;Acceptance   Home Safety Patient Response Patient;Explanation;Acceptance;Verbal Demonstration   Anticipated Discharge Equipment and Recommendations   DC Equipment Recommendations None   Discharge Recommendations Anticipate that the patient will have no further occupational therapy needs after discharge from the hospital   Interdisciplinary Plan of Care Collaboration   IDT Collaboration with  Nursing   Patient Position at End of Therapy In Bed;Call Light within Reach;Tray Table within Reach;Phone within Reach   Collaboration Comments RN aware of OT session   Session Information   Date / Session Number  5/24 #1 (one time only)   Priority 0

## 2022-05-25 NOTE — PROGRESS NOTES
4 Eyes Skin Assessment Completed with CAMERON Gillespie.    Head Redness  Ears Redness  Nose Redness  Mouth WDL  Neck Redness  Breast/Chest Redness  Shoulder Blades Redness  Spine Redness  (R) Arm/Elbow/Hand Redness  (L) Arm/Elbow/Hand Redness  Abdomen WDL  Groin WDL  Scrotum/Coccyx/Buttocks WDL  (R) Leg Redness  (L) Leg Redness  (R) Heel/Foot/Toe Redness  (L) Heel/Foot/Toe Redness          Devices In Places Pulse Ox      Interventions In Place Pillows and Pressure Redistribution Mattress    Possible Skin Injury No    Pictures Uploaded Into Epic N/A  Wound Consult Placed N/A  RN Wound Prevention Protocol Ordered No      Patient has red, blotchy spots all over the body.

## 2022-05-25 NOTE — TELEPHONE ENCOUNTER
FELICITA    Caller: YONATHAN  Topic/issue: Patient needs clarification on her medication.  Callback Number: [phone]    Thank you,  Vickie BAPTISTE
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
## 2022-05-25 NOTE — WOUND TEAM
Received consult for established ostomy. Patient concerned about inferior portion of mucocutaneous junction. Area appears to have beginnings signs of separation, patient given stoma powder to use to fill the area. Otherwise, patient independent with ostomy care as she's had the ileostomy for 20 years.  Wound consult completed. Wound team signing off.

## 2022-05-25 NOTE — CARE PLAN
The patient is Stable - Low risk of patient condition declining or worsening    Shift Goals  Clinical Goals: pain less than 5/10 this shift  Patient Goals: no more nausea  Family Goals: n/a    Progress made toward(s) clinical / shift goals:      Patient is not progressing towards the following goals: pt rated pain >5/10. Pt discharging home today.

## 2022-05-25 NOTE — PROGRESS NOTES
Refused to take am meds, wants to take them intravenously, claims her throat is painful, offered to give it in apple sauce or crush them, still refused. Claimed that if I insist she will leave. To endorse.

## 2022-05-25 NOTE — PROGRESS NOTES
Discharge to home with spouse . Pt signed a copy of the discharge papers and confirms all questions have been answered by the MD or the RN. Second copy of d/c papers in chart.  Prescriptions sent to pts pharmacy. IV discontinued. Pt states all person belongings are in possession.

## 2022-05-25 NOTE — DISCHARGE PLANNING
Case Management Discharge Planning    Admission Date: 5/23/2022  GMLOS: 3.1  ALOS: 2    6-Clicks ADL Score: 22  6-Clicks Mobility Score: 24      Anticipated Discharge Dispo: Discharge Disposition: Discharged to home/self care (01)    DME Needed: No    Action(s) Taken: Updated Provider/Nurse on Discharge Plan    Escalations Completed: None    Medically Clear: No    Next Steps: Medical clearance    Barriers to Discharge: Medical clearance    Is the patient up for discharge tomorrow: No

## 2022-05-25 NOTE — CARE PLAN
The patient is Stable - Low risk of patient condition declining or worsening    Shift Goals  Clinical Goals: relief from nausea and vomiting  Patient Goals: sleep for couple of hours  Family Goals: n/a    Progress made toward(s) clinical / shift goals:  able to calm down and sleep after iv med administration      Problem: Psychosocial  Goal: Patient's level of anxiety will decrease  Outcome: Progressing     Problem: Gastrointestinal Irritability  Goal: Nausea and vomiting will be absent or improve  Outcome: Progressing

## 2022-05-26 ENCOUNTER — PATIENT OUTREACH (OUTPATIENT)
Dept: MEDICAL GROUP | Facility: LAB | Age: 67
End: 2022-05-26
Payer: MEDICARE

## 2022-05-26 NOTE — PROGRESS NOTES
Left voicemail for patient for hospital discharge follow up and to assist patient to schedule pcp appointment. Provided care management phone number [phone].

## 2022-05-26 NOTE — TELEPHONE ENCOUNTER
Called patient mobile number and LM to callback.     Called patient home number and spoke to male, patient is unavailable and he was unaware, he will have her call us back.

## 2022-05-27 NOTE — PROGRESS NOTES
Patient returned call for Transitional Care Management.  Jana stated that she does not feel any better and feels she should have had better care in the hospital.  She stated that she did not see a physician except for the ER and one other time.  She stated she has an ostomy and felt no one knew how to take care of it.  Jana was upset that she was not given a shower or her sheets were not changed.  Listened to her concerns and will follow up with her concerns to the unit.  Jana did say that she was able to get her medications and has a follow up appointment with her PCP.  Patient has contact information.

## 2022-05-28 NOTE — DISCHARGE SUMMARY
Discharge Summary    CHIEF COMPLAINT ON ADMISSION  Chief Complaint   Patient presents with   • N/V   • Sore Throat   • Weakness   • Insomnia     [facility] Pt was admitted to this facility for hydration d/t persistent N/V. Pt left early due to attending a .        Reason for Admission  N/V; Weakness     Admission Date  2022    CODE STATUS  Prior    HPI & HOSPITAL COURSE  This is a 66 y.o. female with past medical history of Crohn's disease here with nausea vomiting and abdominal pain.  She was found to have pancreatitis and FLORES and given supportive treatment including IV fluids, pain management, and antiemetics.  Right upper quadrant ultrasound did not show any bile duct dilation or cholelithiasis. Her TG were also normal.  Her symptoms subsequently improved.      Therefore, she is discharged in good and stable condition to home with close outpatient follow-up.    The patient met 2-midnight criteria for an inpatient stay at the time of discharge.    Discharge Date  2022    FOLLOW UP ITEMS POST DISCHARGE  none    DISCHARGE DIAGNOSES  Principal Problem:    Acute pancreatitis POA: Yes  Active Problems:    Crohn's disease of small intestine with complication (HCC) POA: Yes      Overview: Followed by GI Dr. Cruz      S/p ileostomy      Scope 2014-no strictures, fistulas, or new abscesses    Chronic pain POA: Yes      Overview: On multiple narcotics including high dose oxycodone and Dilaudid      Valium also on medication list    COPD (chronic obstructive pulmonary disease) (HCC) POA: Yes      Overview: On oxygen at night    Paroxysmal atrial fibrillation (HCC) POA: Yes    Essential hypertension POA: Yes    FLORES (acute kidney injury) (HCC) POA: Yes  Resolved Problems:    * No resolved hospital problems. *      FOLLOW UP  Future Appointments   Date Time Provider Department Center   2022  8:40 AM Bennett Delgado M.D. [facility] None   2022  2:40 PM  Chase Charles D.O. [facility] None   6/16/2022  4:00 PM Chase Charles D.O. [facility] None   7/7/2022  2:00 PM Chase Charles D.O. [facility] None     Chase Charles D.O.  [id number] [id number]  [facility] [address]  [phone]    Schedule an appointment as soon as possible for a visit in 1 week(s)        MEDICATIONS ON DISCHARGE     Medication List      START taking these medications      Instructions   diphenhydrAMINE-lidocaine-Maalox (MBX)   Doctor's comments: Mix Maalox: diphenhydramine: lidocaine 2% in 1:1:1 ratio.  Take 5 mL by mouth every 6 hours as needed (orAL pain).  Dose: 5 mL     nystatin 483420 UNIT/ML Susp  Commonly known as: MYCOSTATIN   Take 5 mL by mouth 4 times a day for 4 days.  Dose: 5 mL        CHANGE how you take these medications      Instructions   metoprolol tartrate 50 MG Tabs  What changed:   · how much to take  · how to take this  · when to take this  Commonly known as: LOPRESSOR   TAKE 1 TABLET BY MOUTH TWICE DAILY, HOLD IF BLOOD PRESSURE LESS THAN 95/50     prochlorperazine 10 MG Tabs  What changed: reasons to take this  Commonly known as: COMPAZINE   Take 1 Tablet by mouth 1 time a day as needed.  Dose: 10 mg        CONTINUE taking these medications      Instructions   ondansetron 4 MG Tbdp  Commonly known as: Zofran ODT   Take 1 Tablet by mouth every 6 hours as needed for Nausea.  Dose: 4 mg     oxyCODONE immediate release 10 MG immediate release tablet  Commonly known as: ROXICODONE   Take 10 mg by mouth every four hours as needed for Severe Pain. Indications: Chronic Pain  Dose: 10 mg     PROBIOTIC PO   Take 2 Capsules by mouth every day.  Dose: 2 Capsule     spironolactone 25 MG Tabs  Commonly known as: ALDACTONE   Take 2 Tablets by mouth every day.  Dose: 50 mg     traZODone 50 MG Tabs  Commonly known as: DESYREL   Take 50 mg by mouth one time. Pt only took one night  Dose: 50 mg        STOP taking these medications    methylPREDNISolone 4 MG Tbpk  Commonly known as:  "MEDROL DOSEPAK            Allergies  Allergies   Allergen Reactions   • Azathioprine Sodium Hives and Shortness of Breath   • Infliximab Hives, Shortness of Breath and Rash     .   • Methotrexate Hives, Shortness of Breath and Rash     .   • Methotrexate Hives, Rash and Shortness of Breath     .   • Morphine Shortness of Breath, Swelling and Palpitations   • Promethazine Shortness of Breath and Rash     .   • Roxanol [Morphine Sulfate] Swelling     Throat swells   • Amitriptyline Unspecified     Nightmares     • Carafate [Sucralfate] Vomiting and Nausea     Generalizes/Mouth Sores   • Demerol Vomiting   • Duragesic [Fentanyl]      \"Patches didn't work\"  Skin broke down   • Fentanyl Unspecified     Patches caused skin breakdown, no pain relief   • Lorazepam Unspecified     States give me nightmares.    • Meperidine Vomiting   • Methadone Unspecified     Didn't work   • Methadone Unspecified     Didn't work   • Other Drug Unspecified     steroids   • Pregabalin Rash     Rash     • Pseudoephedrine Vomiting   • Sulfa Drugs Hives and Rash     .  .   • Betamethasone Unspecified     Pt unable to remember   • Celestone [Betamethasone Sodium Phosphate] Unspecified     Pt unable to remember   • Sulfasalazine Rash     On chest       DIET  No orders of the defined types were placed in this encounter.      ACTIVITY  As tolerated.  Weight bearing as tolerated    CONSULTATIONS  none    PROCEDURES  none    LABORATORY  Lab Results   Component Value Date    SODIUM 141 05/24/2022    POTASSIUM 3.5 (L) 05/24/2022    CHLORIDE 106 05/24/2022    CO2 22 05/24/2022    GLUCOSE 124 (H) 05/24/2022    BUN 12 05/24/2022    CREATININE 0.87 05/24/2022    CREATININE 0.89 02/10/2010    GLOMRATE >59 02/10/2010        Lab Results   Component Value Date    WBC 7.7 05/24/2022    WBC 7.9 02/10/2010    HEMOGLOBIN 12.1 05/24/2022    HEMATOCRIT 36.3 (L) 05/24/2022    PLATELETCT 210 05/24/2022        Total time of the discharge process exceeds 36 minutes.  "

## 2022-06-02 ENCOUNTER — HOSPITAL ENCOUNTER (OUTPATIENT)
Facility: MEDICAL CENTER | Age: 67
End: 2022-06-02
Attending: EMERGENCY MEDICINE | Admitting: HOSPITALIST
Payer: MEDICARE

## 2022-06-02 ENCOUNTER — APPOINTMENT (OUTPATIENT)
Dept: RADIOLOGY | Facility: MEDICAL CENTER | Age: 67
End: 2022-06-02
Attending: EMERGENCY MEDICINE
Payer: MEDICARE

## 2022-06-02 VITALS
BODY MASS INDEX: 21.67 KG/M2 | DIASTOLIC BLOOD PRESSURE: 60 MMHG | HEIGHT: 72 IN | SYSTOLIC BLOOD PRESSURE: 157 MMHG | HEART RATE: 110 BPM | WEIGHT: 160 LBS | TEMPERATURE: 97.7 F | OXYGEN SATURATION: 96 % | RESPIRATION RATE: 19 BRPM

## 2022-06-02 DIAGNOSIS — E86.0 DEHYDRATION: ICD-10-CM

## 2022-06-02 DIAGNOSIS — R52 BODY ACHES: ICD-10-CM

## 2022-06-02 DIAGNOSIS — N17.9 AKI (ACUTE KIDNEY INJURY) (HCC): Primary | ICD-10-CM

## 2022-06-02 DIAGNOSIS — R11.2 INTRACTABLE VOMITING WITH NAUSEA, UNSPECIFIED VOMITING TYPE: ICD-10-CM

## 2022-06-02 DIAGNOSIS — R10.13 EPIGASTRIC PAIN: ICD-10-CM

## 2022-06-02 DIAGNOSIS — R00.0 TACHYCARDIA: ICD-10-CM

## 2022-06-02 DIAGNOSIS — R19.7 DIARRHEA, UNSPECIFIED TYPE: ICD-10-CM

## 2022-06-02 PROBLEM — R13.12 OROPHARYNGEAL DYSPHAGIA: Status: ACTIVE | Noted: 2022-06-02

## 2022-06-02 PROBLEM — F12.10 MILD TETRAHYDROCANNABINOL (THC) ABUSE: Chronic | Status: ACTIVE | Noted: 2022-06-02

## 2022-06-02 LAB
ALBUMIN SERPL BCP-MCNC: 4.4 G/DL (ref 3.2–4.9)
ALBUMIN/GLOB SERPL: 1.3 G/DL
ALP SERPL-CCNC: 93 U/L (ref 30–99)
ALT SERPL-CCNC: 19 U/L (ref 2–50)
AMPHET UR QL SCN: NEGATIVE
ANION GAP SERPL CALC-SCNC: 14 MMOL/L (ref 7–16)
APPEARANCE UR: CLEAR
AST SERPL-CCNC: 39 U/L (ref 12–45)
BARBITURATES UR QL SCN: NEGATIVE
BASOPHILS # BLD AUTO: 0.7 % (ref 0–1.8)
BASOPHILS # BLD: 0.04 K/UL (ref 0–0.12)
BENZODIAZ UR QL SCN: POSITIVE
BILIRUB SERPL-MCNC: 0.4 MG/DL (ref 0.1–1.5)
BILIRUB UR QL STRIP.AUTO: NEGATIVE
BUN SERPL-MCNC: 29 MG/DL (ref 8–22)
BZE UR QL SCN: NEGATIVE
CALCIUM SERPL-MCNC: 10.4 MG/DL (ref 8.4–10.2)
CANNABINOIDS UR QL SCN: POSITIVE
CHLORIDE SERPL-SCNC: 97 MMOL/L (ref 96–112)
CO2 SERPL-SCNC: 25 MMOL/L (ref 20–33)
COLOR UR: YELLOW
CREAT SERPL-MCNC: 1.72 MG/DL (ref 0.5–1.4)
EKG IMPRESSION: NORMAL
EOSINOPHIL # BLD AUTO: 0.13 K/UL (ref 0–0.51)
EOSINOPHIL NFR BLD: 2.1 % (ref 0–6.9)
ERYTHROCYTE [DISTWIDTH] IN BLOOD BY AUTOMATED COUNT: 43.1 FL (ref 35.9–50)
ETHANOL BLD-MCNC: <10.1 MG/DL
FLUAV RNA SPEC QL NAA+PROBE: NEGATIVE
FLUBV RNA SPEC QL NAA+PROBE: NEGATIVE
GFR SERPLBLD CREATININE-BSD FMLA CKD-EPI: 32 ML/MIN/1.73 M 2
GLOBULIN SER CALC-MCNC: 3.3 G/DL (ref 1.9–3.5)
GLUCOSE SERPL-MCNC: 104 MG/DL (ref 65–99)
GLUCOSE UR STRIP.AUTO-MCNC: NEGATIVE MG/DL
HCT VFR BLD AUTO: 38.6 % (ref 37–47)
HGB BLD-MCNC: 12.6 G/DL (ref 12–16)
IMM GRANULOCYTES # BLD AUTO: 0.02 K/UL (ref 0–0.11)
IMM GRANULOCYTES NFR BLD AUTO: 0.3 % (ref 0–0.9)
KETONES UR STRIP.AUTO-MCNC: 15 MG/DL
LACTATE BLD-SCNC: 1.7 MMOL/L (ref 0.5–2)
LEUKOCYTE ESTERASE UR QL STRIP.AUTO: NEGATIVE
LIPASE SERPL-CCNC: 14 U/L (ref 7–58)
LYMPHOCYTES # BLD AUTO: 1.75 K/UL (ref 1–4.8)
LYMPHOCYTES NFR BLD: 28.8 % (ref 22–41)
MCH RBC QN AUTO: 31 PG (ref 27–33)
MCHC RBC AUTO-ENTMCNC: 32.6 G/DL (ref 33.6–35)
MCV RBC AUTO: 94.8 FL (ref 81.4–97.8)
METHADONE UR QL SCN: NEGATIVE
MICRO URNS: ABNORMAL
MONOCYTES # BLD AUTO: 0.63 K/UL (ref 0–0.85)
MONOCYTES NFR BLD AUTO: 10.4 % (ref 0–13.4)
NEUTROPHILS # BLD AUTO: 3.51 K/UL (ref 2–7.15)
NEUTROPHILS NFR BLD: 57.7 % (ref 44–72)
NITRITE UR QL STRIP.AUTO: NEGATIVE
NRBC # BLD AUTO: 0 K/UL
NRBC BLD-RTO: 0 /100 WBC
NT-PROBNP SERPL IA-MCNC: 129 PG/ML (ref 0–125)
OPIATES UR QL SCN: NEGATIVE
OXYCODONE UR QL SCN: POSITIVE
PCP UR QL SCN: NEGATIVE
PH UR STRIP.AUTO: 5.5 [PH] (ref 5–8)
PLATELET # BLD AUTO: 223 K/UL (ref 164–446)
PMV BLD AUTO: 10.4 FL (ref 9–12.9)
POTASSIUM SERPL-SCNC: 3.9 MMOL/L (ref 3.6–5.5)
PROPOXYPH UR QL SCN: NEGATIVE
PROT SERPL-MCNC: 7.7 G/DL (ref 6–8.2)
PROT UR QL STRIP: NEGATIVE MG/DL
RBC # BLD AUTO: 4.07 M/UL (ref 4.2–5.4)
RBC UR QL AUTO: NEGATIVE
RSV RNA SPEC QL NAA+PROBE: NEGATIVE
SARS-COV-2 RNA RESP QL NAA+PROBE: NOTDETECTED
SODIUM SERPL-SCNC: 136 MMOL/L (ref 135–145)
SP GR UR STRIP.AUTO: 1.02
SPECIMEN SOURCE: NORMAL
TROPONIN T SERPL-MCNC: 9 NG/L (ref 6–19)
WBC # BLD AUTO: 6.1 K/UL (ref 4.8–10.8)

## 2022-06-02 PROCEDURE — 87040 BLOOD CULTURE FOR BACTERIA: CPT

## 2022-06-02 PROCEDURE — A9270 NON-COVERED ITEM OR SERVICE: HCPCS | Performed by: HOSPITALIST

## 2022-06-02 PROCEDURE — 96375 TX/PRO/DX INJ NEW DRUG ADDON: CPT

## 2022-06-02 PROCEDURE — 83605 ASSAY OF LACTIC ACID: CPT

## 2022-06-02 PROCEDURE — 81003 URINALYSIS AUTO W/O SCOPE: CPT | Mod: XU

## 2022-06-02 PROCEDURE — 700111 HCHG RX REV CODE 636 W/ 250 OVERRIDE (IP): Performed by: EMERGENCY MEDICINE

## 2022-06-02 PROCEDURE — 700105 HCHG RX REV CODE 258: Performed by: HOSPITALIST

## 2022-06-02 PROCEDURE — 700102 HCHG RX REV CODE 250 W/ 637 OVERRIDE(OP): Performed by: HOSPITALIST

## 2022-06-02 PROCEDURE — 700105 HCHG RX REV CODE 258: Performed by: EMERGENCY MEDICINE

## 2022-06-02 PROCEDURE — 82077 ASSAY SPEC XCP UR&BREATH IA: CPT

## 2022-06-02 PROCEDURE — C9803 HOPD COVID-19 SPEC COLLECT: HCPCS | Performed by: EMERGENCY MEDICINE

## 2022-06-02 PROCEDURE — 99285 EMERGENCY DEPT VISIT HI MDM: CPT

## 2022-06-02 PROCEDURE — 96374 THER/PROPH/DIAG INJ IV PUSH: CPT

## 2022-06-02 PROCEDURE — 83690 ASSAY OF LIPASE: CPT

## 2022-06-02 PROCEDURE — 84484 ASSAY OF TROPONIN QUANT: CPT

## 2022-06-02 PROCEDURE — 36415 COLL VENOUS BLD VENIPUNCTURE: CPT

## 2022-06-02 PROCEDURE — G0378 HOSPITAL OBSERVATION PER HR: HCPCS

## 2022-06-02 PROCEDURE — 99220 PR INITIAL OBSERVATION CARE,LEVL III: CPT | Performed by: HOSPITALIST

## 2022-06-02 PROCEDURE — 80053 COMPREHEN METABOLIC PANEL: CPT

## 2022-06-02 PROCEDURE — A9270 NON-COVERED ITEM OR SERVICE: HCPCS | Performed by: INTERNAL MEDICINE

## 2022-06-02 PROCEDURE — 92610 EVALUATE SWALLOWING FUNCTION: CPT

## 2022-06-02 PROCEDURE — 96376 TX/PRO/DX INJ SAME DRUG ADON: CPT

## 2022-06-02 PROCEDURE — 93005 ELECTROCARDIOGRAM TRACING: CPT | Performed by: EMERGENCY MEDICINE

## 2022-06-02 PROCEDURE — 700111 HCHG RX REV CODE 636 W/ 250 OVERRIDE (IP): Performed by: INTERNAL MEDICINE

## 2022-06-02 PROCEDURE — 83880 ASSAY OF NATRIURETIC PEPTIDE: CPT

## 2022-06-02 PROCEDURE — 85025 COMPLETE CBC W/AUTO DIFF WBC: CPT

## 2022-06-02 PROCEDURE — 0241U HCHG SARS-COV-2 COVID-19 NFCT DS RESP RNA 4 TRGT MIC: CPT

## 2022-06-02 PROCEDURE — 71045 X-RAY EXAM CHEST 1 VIEW: CPT

## 2022-06-02 PROCEDURE — 700102 HCHG RX REV CODE 250 W/ 637 OVERRIDE(OP): Performed by: INTERNAL MEDICINE

## 2022-06-02 PROCEDURE — 80307 DRUG TEST PRSMV CHEM ANLYZR: CPT

## 2022-06-02 RX ORDER — ONDANSETRON 4 MG/1
4 TABLET, ORALLY DISINTEGRATING ORAL EVERY 4 HOURS PRN
Status: DISCONTINUED | OUTPATIENT
Start: 2022-06-02 | End: 2022-06-02 | Stop reason: HOSPADM

## 2022-06-02 RX ORDER — HYDROMORPHONE HYDROCHLORIDE 1 MG/ML
1 INJECTION, SOLUTION INTRAMUSCULAR; INTRAVENOUS; SUBCUTANEOUS ONCE
Status: COMPLETED | OUTPATIENT
Start: 2022-06-02 | End: 2022-06-02

## 2022-06-02 RX ORDER — PROCHLORPERAZINE EDISYLATE 5 MG/ML
10 INJECTION INTRAMUSCULAR; INTRAVENOUS ONCE
Status: COMPLETED | OUTPATIENT
Start: 2022-06-02 | End: 2022-06-02

## 2022-06-02 RX ORDER — SODIUM CHLORIDE 9 MG/ML
1000 INJECTION, SOLUTION INTRAVENOUS ONCE
Status: COMPLETED | OUTPATIENT
Start: 2022-06-02 | End: 2022-06-02

## 2022-06-02 RX ORDER — HYDROMORPHONE HYDROCHLORIDE 1 MG/ML
1 INJECTION, SOLUTION INTRAMUSCULAR; INTRAVENOUS; SUBCUTANEOUS
Status: DISCONTINUED | OUTPATIENT
Start: 2022-06-02 | End: 2022-06-02 | Stop reason: HOSPADM

## 2022-06-02 RX ORDER — HYDROMORPHONE HYDROCHLORIDE 2 MG/1
4 TABLET ORAL
Status: DISCONTINUED | OUTPATIENT
Start: 2022-06-02 | End: 2022-06-02 | Stop reason: HOSPADM

## 2022-06-02 RX ORDER — METOPROLOL TARTRATE 50 MG/1
50 TABLET, FILM COATED ORAL 2 TIMES DAILY
Status: DISCONTINUED | OUTPATIENT
Start: 2022-06-02 | End: 2022-06-02 | Stop reason: HOSPADM

## 2022-06-02 RX ORDER — PROCHLORPERAZINE MALEATE 10 MG
10 TABLET ORAL ONCE
Status: DISCONTINUED | OUTPATIENT
Start: 2022-06-02 | End: 2022-06-02

## 2022-06-02 RX ORDER — ONDANSETRON 2 MG/ML
4 INJECTION INTRAMUSCULAR; INTRAVENOUS ONCE
Status: COMPLETED | OUTPATIENT
Start: 2022-06-02 | End: 2022-06-02

## 2022-06-02 RX ORDER — ONDANSETRON 2 MG/ML
4 INJECTION INTRAMUSCULAR; INTRAVENOUS EVERY 4 HOURS PRN
Status: DISCONTINUED | OUTPATIENT
Start: 2022-06-02 | End: 2022-06-02 | Stop reason: HOSPADM

## 2022-06-02 RX ORDER — SPIRONOLACTONE 50 MG/1
50 TABLET, FILM COATED ORAL DAILY
Status: DISCONTINUED | OUTPATIENT
Start: 2022-06-02 | End: 2022-06-02 | Stop reason: HOSPADM

## 2022-06-02 RX ORDER — HYDROMORPHONE HYDROCHLORIDE 2 MG/1
2 TABLET ORAL
Status: DISCONTINUED | OUTPATIENT
Start: 2022-06-02 | End: 2022-06-02 | Stop reason: HOSPADM

## 2022-06-02 RX ORDER — OXYCODONE HYDROCHLORIDE 10 MG/1
10 TABLET ORAL EVERY 4 HOURS PRN
Status: DISCONTINUED | OUTPATIENT
Start: 2022-06-02 | End: 2022-06-02

## 2022-06-02 RX ORDER — DIPHENHYDRAMINE HYDROCHLORIDE 50 MG/ML
25 INJECTION INTRAMUSCULAR; INTRAVENOUS ONCE
Status: COMPLETED | OUTPATIENT
Start: 2022-06-02 | End: 2022-06-02

## 2022-06-02 RX ORDER — SODIUM CHLORIDE 9 MG/ML
INJECTION, SOLUTION INTRAVENOUS CONTINUOUS
Status: DISCONTINUED | OUTPATIENT
Start: 2022-06-02 | End: 2022-06-02 | Stop reason: HOSPADM

## 2022-06-02 RX ORDER — ACETAMINOPHEN 325 MG/1
650 TABLET ORAL EVERY 6 HOURS PRN
Status: DISCONTINUED | OUTPATIENT
Start: 2022-06-02 | End: 2022-06-02 | Stop reason: HOSPADM

## 2022-06-02 RX ORDER — PROCHLORPERAZINE EDISYLATE 5 MG/ML
10 INJECTION INTRAMUSCULAR; INTRAVENOUS EVERY 6 HOURS PRN
Status: DISCONTINUED | OUTPATIENT
Start: 2022-06-02 | End: 2022-06-02 | Stop reason: HOSPADM

## 2022-06-02 RX ADMIN — HYDROMORPHONE HYDROCHLORIDE 1 MG: 1 INJECTION, SOLUTION INTRAMUSCULAR; INTRAVENOUS; SUBCUTANEOUS at 12:45

## 2022-06-02 RX ADMIN — PROCHLORPERAZINE EDISYLATE 10 MG: 5 INJECTION INTRAMUSCULAR; INTRAVENOUS at 10:10

## 2022-06-02 RX ADMIN — NYSTATIN 500000 UNITS: 100000 SUSPENSION ORAL at 10:15

## 2022-06-02 RX ADMIN — PROCHLORPERAZINE EDISYLATE 10 MG: 5 INJECTION INTRAMUSCULAR; INTRAVENOUS at 05:18

## 2022-06-02 RX ADMIN — SODIUM CHLORIDE 1000 ML: 9 INJECTION, SOLUTION INTRAVENOUS at 05:18

## 2022-06-02 RX ADMIN — NYSTATIN 500000 UNITS: 100000 SUSPENSION ORAL at 17:30

## 2022-06-02 RX ADMIN — ONDANSETRON 4 MG: 2 INJECTION INTRAMUSCULAR; INTRAVENOUS at 03:34

## 2022-06-02 RX ADMIN — PROCHLORPERAZINE EDISYLATE 10 MG: 5 INJECTION INTRAMUSCULAR; INTRAVENOUS at 17:31

## 2022-06-02 RX ADMIN — HYDROMORPHONE HYDROCHLORIDE 1 MG: 1 INJECTION, SOLUTION INTRAMUSCULAR; INTRAVENOUS; SUBCUTANEOUS at 03:34

## 2022-06-02 RX ADMIN — METOPROLOL TARTRATE 50 MG: 50 TABLET, FILM COATED ORAL at 17:31

## 2022-06-02 RX ADMIN — DIPHENHYDRAMINE HYDROCHLORIDE 25 MG: 50 INJECTION, SOLUTION INTRAMUSCULAR; INTRAVENOUS at 05:18

## 2022-06-02 RX ADMIN — SODIUM CHLORIDE: 9 INJECTION, SOLUTION INTRAVENOUS at 08:13

## 2022-06-02 RX ADMIN — HYDROMORPHONE HYDROCHLORIDE 1 MG: 1 INJECTION, SOLUTION INTRAMUSCULAR; INTRAVENOUS; SUBCUTANEOUS at 17:30

## 2022-06-02 RX ADMIN — NYSTATIN 500000 UNITS: 100000 SUSPENSION ORAL at 14:17

## 2022-06-02 RX ADMIN — SODIUM CHLORIDE 1000 ML: 9 INJECTION, SOLUTION INTRAVENOUS at 03:34

## 2022-06-02 RX ADMIN — SODIUM CHLORIDE: 9 INJECTION, SOLUTION INTRAVENOUS at 18:31

## 2022-06-02 ASSESSMENT — ENCOUNTER SYMPTOMS
PALPITATIONS: 0
VOMITING: 0
ABDOMINAL PAIN: 0
DIZZINESS: 0
NECK PAIN: 0
VOMITING: 1
WEAKNESS: 0
MYALGIAS: 0
HEADACHES: 0
NAUSEA: 1
FEVER: 0
COUGH: 0
DEPRESSION: 0
INSOMNIA: 0
DOUBLE VISION: 0
SORE THROAT: 0
BRUISES/BLEEDS EASILY: 0
BLURRED VISION: 0
SHORTNESS OF BREATH: 0
NAUSEA: 0

## 2022-06-02 ASSESSMENT — LIFESTYLE VARIABLES
TOTAL SCORE: 0
DO YOU DRINK ALCOHOL: NO
EVER HAD A DRINK FIRST THING IN THE MORNING TO STEADY YOUR NERVES TO GET RID OF A HANGOVER: NO
EVER FELT BAD OR GUILTY ABOUT YOUR DRINKING: NO
AVERAGE NUMBER OF DAYS PER WEEK YOU HAVE A DRINK CONTAINING ALCOHOL: 1
TOTAL SCORE: 0
ON A TYPICAL DAY WHEN YOU DRINK ALCOHOL HOW MANY DRINKS DO YOU HAVE: 0
TOTAL SCORE: 0
TOTAL SCORE: 0
HAVE PEOPLE ANNOYED YOU BY CRITICIZING YOUR DRINKING: NO
CONSUMPTION TOTAL: INCOMPLETE
HAVE YOU EVER FELT YOU SHOULD CUT DOWN ON YOUR DRINKING: NO
EVER FELT BAD OR GUILTY ABOUT YOUR DRINKING: NO
TOTAL SCORE: 0
CONSUMPTION TOTAL: NEGATIVE
EVER HAD A DRINK FIRST THING IN THE MORNING TO STEADY YOUR NERVES TO GET RID OF A HANGOVER: NO
HAVE YOU EVER FELT YOU SHOULD CUT DOWN ON YOUR DRINKING: NO
ALCOHOL_USE: YES
TOTAL SCORE: 0
HOW MANY TIMES IN THE PAST YEAR HAVE YOU HAD 5 OR MORE DRINKS IN A DAY: 0
HAVE PEOPLE ANNOYED YOU BY CRITICIZING YOUR DRINKING: NO

## 2022-06-02 ASSESSMENT — FIBROSIS 4 INDEX: FIB4 SCORE: 1.48

## 2022-06-02 ASSESSMENT — PAIN DESCRIPTION - PAIN TYPE: TYPE: ACUTE PAIN

## 2022-06-02 NOTE — ED NOTES
"Patient very cheerful, animated, pleasant and conversive. Patient denies need for more pain medication at this time. EMP aware of pain rating score of 8/10 and patients wishing to \"wait\". NAD  "

## 2022-06-02 NOTE — ED TRIAGE NOTES
Chief Complaint   Patient presents with   • N/V     65 y/o female presents with a chief complaint of nausea, and vomiting. Per patient she has been having nausea, and vomiting for several months, but worsening over the evening. Patient stated that she attempted to self treat with zofran without relief. Patient stated that her last dose of zofran was at 1800.    • Leg Swelling     Patient stated that she has increased swelling to her bilateral lower legs. Patient stated that she was having difficulty ambulating due to the swelling, stating that it feels as if her legs are buckling.   • Skin Lesion     Patient stated that she is concerns about bed sores to her lower back and glutes.    • Diarrhea     Patient stated increased ostomy output.     /81   Pulse (!) 105   Temp 36.3 °C (97.3 °F) (Temporal)   Resp 20   Ht 1.829 m (6')   Wt 72.6 kg (160 lb)   SpO2 97%   BMI 21.70 kg/m²

## 2022-06-02 NOTE — THERAPY
"Speech Language Pathology   Clinical Swallow Evaluation     Patient Name: Jana Overton  AGE:  66 y.o., SEX:  female  Medical Record #: [id number]  Today's Date: 6/2/2022     Precautions: Fall Risk, Swallow Precautions ( See Comments)        HPI: The pt is a 65 y/o F who was admitted 6/2/22 under observation. The pt presented with N/V without relief.      PMHx:  Crohn's disease with ileostomy, chronic pain syndrome, HTN, recent pancreatitis, FLORES, RA, lupus    CXR 6/2 -  No acute cardiac or pulmonary abnormalities are identified.    Speech Pathology:  CSE 6/2/19 - Pt c/o globus sensation with soft solids; Drinks thicker water (Smart Water) without thickener added; Rec: Puree, Thin liquid  CSE 5/20/19 - Facial grimacing and odynophagia; Rec: Wired jaw/puree, Thin liquid      Level of Consciousness: Alert, Awake  Affect/Behavior: Appropriate, Calm  Follows Directives: Yes  Orientation: Oriented x 4  Hearing: Functional hearing  Vision: Functional vision, Wears corrective lenses      Prior Living Situation & Level of Function:  Pt reported that she lives with her  and is fairly independent. At baseline, pt consumes mostly blended foods, including a lot of soups (hand  used) however does consume \"finely chopped\" meats. She prefers blended vegetables. Pt reported that when she's \"bad\" she feels a globus sensation and will sometimes crush her medications. Pt indicated that she frequently uses magic mouthwash for sores in her mouth and throat. Additionally, pt indicated that she drinks \"thick water\", such as Smart Water or [place] (no thickener added).       Oral Mechanism Evaluation  Facial Symmetry: Equal  Facial Sensation: Equal  Labial Observations: WFL  Lingual Observations: Midline  Dentition: Good, Natural dentition  Comments: No oral sore identified however pt reported that they were healing (located on inferior lingual surface and inside buccal regions)      Voice  Quality: WFL  Resonance: WFL  Intensity: " Appropriate  Cough: WFL  Comments:      Current Method of Nutrition   Oral diet (RG7/TN0)      Subjective  RN cleared the pt for speech tx, no acute changes reported. Pt was received awake and alert in bed. She was pleasant and cooperative. Pt was tangential and required verbal cues to answer questions directly.       Assessment  Positioning: Márquez's (60-90 degrees)  Bolus Administration: Patient  Oxygen Requirements: Room Air  Factor(s) Affecting Performance: None    Swallowing Trials  Ice: Not tested  Thin Liquid (TN0): WFL  Pureed (PU4): WFL  Regular (RG7): WFL    Comments: Appropriate oral acceptance and sufficient containment. Adequate bite. Minimal but adequate mastication of a saltine cracker (Club crackers preferred). Presumed volitionally slowed oral manipulation and lingual motion. Complete oral clearance. Facial grimacing observed across all consistencies when swallowing. No overt signs of aspiration noted. Voice was dry pre and post oral intake.       Clinical Impressions  The pt presents with symptoms similar to previous admissions with primary c/o odynophagia. This appears r/t acute Crohn's flare-up. Aspiration risk from oral intake appears low at this time. A modified diet is indicated to reflect her baseline diet at home. Pt reported that she prefers finely chopped meats instead of pureed, was agreeable to MM5 solids. Suspect improvement as flare up subsides.       Recommendations  1.  Minced & Moist solids (MM5), Thin liquids (TN0) - Consider ordering Magic Cup, per patient request  2.  Instrumentation: None indicated at this time  3.  Swallowing Instructions & Precautions:   Supervision: Assist with meal tray set up  Positioning: Fully upright and midline during oral intake  Medication: Crush with applesauce or puree, as appropriate  Strategies: Small bites/sips, Slow rate of intake  Oral Care: BID  4.  SLP to follow for swallow tx and education, as indicated.  5.  Pt reported decreased precise  "articulation recently. Pt can functionally communicate all wants/needs at this time. Therefore, recommend OP speech therapy services if concerns persist.     Results and recs d/w pt and RN. Thank you.      Plan  Recommend Speech Therapy 2 times per week until therapy goals are met for the following treatments:  Patient / Family / Caregiver Education.  Discharge Recommendations: Recommend outpatient speech therapy services       06/02/22 1345   Charge Group   SLP Oral Pharyngeal Evaluation Oral Pharyngeal Evaluation   Total Treatment Time   SLP Time Spent Yes   SLP Oral Pharyngeal Evaluation (Mins) 30   SLP Total Time Spent 30   Vitals   O2 (LPM) 0   O2 Delivery Device None - Room Air   Pain 0 - 10 Group   Therapist Pain Assessment Post Activity Pain Same as Prior to Activity   Prior Living Situation   Prior Services None   Housing / Facility 1 Story House   Lives with - Patient's Self Care Capacity Spouse   Prior Level Of Function   Communication Within Functional Limits   Swallow Impaired   Dentition Intact   Dentures None   Hearing Within Functional Limits for Evaluation   Hearing Aid None   Vision Within Functional Limits for Evaluation;Wears Corrective Lenses   Patient's Primary Language English   Occupation (Pre-Hospital Vocational) Retired Due To Disability   Patient / Family Goals   Patient / Family Goal #1 \"I eat a lot of homemade soups\"   Short Term Goals   Short Term Goal # 1 Pt will consume MM5/TN0 without overt signs of aspiration independently.     "

## 2022-06-02 NOTE — PROGRESS NOTES
Received report from nightshift RN. Pt is A&O x 4, on RA, reports generalized sharp shooting pain 9/10, and baseline numbness and tingling on extremities. Fall precautions in place, call light within reach. All needs met at this time.

## 2022-06-02 NOTE — DISCHARGE PLANNING
Case Management Discharge Planning    Admission Date: 6/2/2022  GMLOS:    ALOS: 0    6-Clicks ADL Score:    6-Clicks Mobility Score:        Anticipated Discharge Dispo: Discharge Disposition: Discharged to home/self care (01)    DME Needed: No    Action(s) Taken: Chart review completed. Per chart, pt ambulates/mobilizes with SB assistance and FWW. No CM needs noted at this time.    Escalations Completed: None    Medically Clear: No    Next Steps:  f/u with medical team to discuss DC needs and barriers    Barriers to Discharge: Medical clearance

## 2022-06-02 NOTE — H&P
Hospital Medicine History & Physical Note    Date of Service  6/2/2022    Primary Care Physician  Chase Charles D.O.    Consultants  None      Code Status  Full Code    Chief Complaint  Chief Complaint   Patient presents with   • N/V     67 y/o female presents with a chief complaint of nausea, and vomiting. Per patient she has been having nausea, and vomiting for several months, but worsening over the evening. Patient stated that she attempted to self treat with zofran without relief. Patient stated that her last dose of zofran was at 1800.    • Leg Swelling     Patient stated that she has increased swelling to her bilateral lower legs. Patient stated that she was having difficulty ambulating due to the swelling, stating that it feels as if her legs are buckling.   • Skin Lesion     Patient stated that she is concerns about bed sores to her lower back and glutes.    • Diarrhea     Patient stated increased ostomy output.       History of Presenting Illness  Jana Overton is a 66 y.o. female, history of Crohn's disease status post ileostomy, RA, lupus admitted twice this month for dehydration, who presented to the emergency department on 6/2/2022 with increasing fatigue, ongoing diarrhea and overall not feeling well.  Creatinine today is once again elevated to 1.72.  She has history of multiple allergies including AZA and steroids (steroid Psychosis).  She was a started on IV fluids, received 2 L, was given 1 mg of Dilaudid and 25 mg Benadryl in the ED and I was called for admission.    I discussed the plan of care with patient.    Review of Systems  Review of Systems   Constitutional: Positive for malaise/fatigue. Negative for fever.   HENT: Negative for congestion and sore throat.    Eyes: Negative for blurred vision and double vision.   Respiratory: Negative for cough and shortness of breath.    Cardiovascular: Negative for chest pain and palpitations.   Gastrointestinal: Negative for nausea and vomiting.    Genitourinary: Negative for dysuria and urgency.   Musculoskeletal: Negative for myalgias and neck pain.   Skin: Negative for itching and rash.   Neurological: Negative for dizziness, weakness and headaches.   Endo/Heme/Allergies: Does not bruise/bleed easily.   Psychiatric/Behavioral: Negative for depression. The patient does not have insomnia.        Past Medical History   has a past medical history of Anesthesia, Anxiety, Arthritis, ASTHMA, Atrial fib/flut, Backpain, Bronchitis, Chronic pain, Colostomy in place ([facility]) (10/14/2010), COPD (chronic obstructive pulmonary disease) ([facility]) (6/24/2014), COPD (chronic obstructive pulmonary disease) ([facility]) (6/24/2014), Crohn's disease of colon ([facility]), Dyspnea, History of cardiac murmur, Hypokalemia (6/24/2014), Ileostomy present ([facility]), Infectious disease, Multiple falls (6/24/2014), Narcotic dependence ([facility]) (9/23/2009), Obstruction, Other specified disorder of intestines, Pain (7/11/13), Pneumonia, Postherpetic neuralgia (6/24/2014), Rosacea (6/24/2014), and Sleep apnea.    Surgical History   has a past surgical history that includes lumbar fusion anterior; cervical disk and fusion anterior; foot surgery; hand surgery; other abdominal surgery; gastroscopy with biopsy (11/22/08); ileostomy (11/11/2009); dilation and curettage (9/24/2010); gastroscopy (10/4/2011); colonoscopy (10/4/2011); hardware removal neuro (7/16/2012); mammoplasty reduction (7/17/2013); ileostomy (5/14/2014); pr breast reduction; abdominal exploration; lumpectomy; colon resection; ileostomy (12/29/2018); sigmoidoscopy flex (12/29/2018); exploratory laparotomy (12/29/2018); gastroscopy (1/6/2019); gastroscopy (N/A, 5/22/2019); ileostomy (1/20/2021); lysis adhesions general (1/20/2021); pr cysto/uretero/pyeloscopy, dx (Right, 12/8/2021); cysto stent placemnt pre surg (Right, 12/8/2021); pr cystoscopy,insert ureteral stent (Right, 12/23/2021); and pr cysto/uretero/pyeloscopy, dx (Right, 12/23/2021).  "    Family History  family history includes Cancer (age of onset: 40) in her maternal aunt; Diabetes in her father and sister; Heart Disease in her father; Lung Disease in her mother.   Family history reviewed with patient. There is no family history that is pertinent to the chief complaint.     Social History   reports that she has never smoked. She has never used smokeless tobacco. She reports previous alcohol use of about 0.6 oz of alcohol per week. She reports previous drug use. Drugs: Marijuana and Inhaled.    Allergies  Allergies   Allergen Reactions   • Azathioprine Sodium Hives and Shortness of Breath   • Infliximab Hives, Shortness of Breath and Rash     .   • Methotrexate Hives, Shortness of Breath and Rash     .   • Methotrexate Hives, Rash and Shortness of Breath     .   • Morphine Shortness of Breath, Swelling and Palpitations   • Promethazine Shortness of Breath and Rash     .   • Roxanol [Morphine Sulfate] Swelling     Throat swells   • Amitriptyline Unspecified     Nightmares     • Carafate [Sucralfate] Vomiting and Nausea     Generalizes/Mouth Sores   • Demerol Vomiting   • Duragesic [Fentanyl]      \"Patches didn't work\"  Skin broke down   • Fentanyl Unspecified     Patches caused skin breakdown, no pain relief   • Lorazepam Unspecified     States give me nightmares.    • Meperidine Vomiting   • Methadone Unspecified     Didn't work   • Methadone Unspecified     Didn't work   • Other Drug Unspecified     steroids   • Pregabalin Rash     Rash     • Pseudoephedrine Vomiting   • Sulfa Drugs Hives and Rash     .  .   • Betamethasone Unspecified     Pt unable to remember   • Celestone [Betamethasone Sodium Phosphate] Unspecified     Pt unable to remember   • Sulfasalazine Rash     On chest       Medications  Prior to Admission Medications   Prescriptions Last Dose Informant Patient Reported? Taking?   Probiotic Product (PROBIOTIC PO)  Significant Other Yes No   Sig: Take 2 Capsules by mouth every day. "   diphenhydrAMINE-lidocaine-Maalox (MBX) oral susp cup   No No   Sig: Take 5 mL by mouth every 6 hours as needed (orAL pain).   metoprolol tartrate (LOPRESSOR) 50 MG Tab  Significant Other No No   Sig: TAKE 1 TABLET BY MOUTH TWICE DAILY, HOLD IF BLOOD PRESSURE LESS THAN 95/50   Patient taking differently: Take 50 mg by mouth 2 times a day. TAKE 1 TABLET BY MOUTH TWICE DAILY, HOLD IF BLOOD PRESSURE LESS THAN 95/50   ondansetron (ZOFRAN ODT) 4 MG TABLET DISPERSIBLE   No No   Sig: Take 1 Tablet by mouth every 6 hours as needed for Nausea.   oxyCODONE immediate release (ROXICODONE) 10 MG immediate release tablet  Significant Other Yes No   Sig: Take 10 mg by mouth every four hours as needed for Severe Pain. Indications: Chronic Pain   prochlorperazine (COMPAZINE) 10 MG Tab  Significant Other No No   Sig: Take 1 Tablet by mouth 1 time a day as needed.   Patient taking differently: Take 10 mg by mouth 1 time a day as needed for Nausea/Vomiting.   spironolactone (ALDACTONE) 25 MG Tab  Significant Other No No   Sig: Take 2 Tablets by mouth every day.   traZODone (DESYREL) 50 MG Tab  Significant Other Yes No   Sig: Take 50 mg by mouth one time. Pt only took one night      Facility-Administered Medications: None       Physical Exam  Temp:  [36.3 °C (97.3 °F)] 36.3 °C (97.3 °F)  Pulse:  [] 98  Resp:  [20] 20  BP: (135-159)/(64-81) 150/69  SpO2:  [93 %-99 %] 99 %  Blood Pressure : (!) 150/69   Temperature: 36.3 °C (97.3 °F)   Pulse: 98   Respiration: 20   Pulse Oximetry: 99 %       Physical Exam  Constitutional:       Appearance: Normal appearance.   HENT:      Head: Normocephalic and atraumatic.      Mouth/Throat:      Mouth: Mucous membranes are dry.      Pharynx: Oropharynx is clear.   Eyes:      Extraocular Movements: Extraocular movements intact.      Pupils: Pupils are equal, round, and reactive to light.   Cardiovascular:      Rate and Rhythm: Normal rate and regular rhythm.      Heart sounds: Normal heart sounds.    Pulmonary:      Effort: Pulmonary effort is normal.      Breath sounds: Normal breath sounds.   Abdominal:      General: Abdomen is flat. Bowel sounds are normal.      Palpations: Abdomen is soft.   Musculoskeletal:      Cervical back: Normal range of motion and neck supple.   Skin:     General: Skin is warm and dry.   Neurological:      General: No focal deficit present.      Mental Status: She is alert and oriented to person, place, and time.   Psychiatric:         Mood and Affect: Mood normal.         Behavior: Behavior normal.         Laboratory:  Recent Labs     06/02/22  0320   WBC 6.1   RBC 4.07*   HEMOGLOBIN 12.6   HEMATOCRIT 38.6   MCV 94.8   MCH 31.0   MCHC 32.6*   RDW 43.1   PLATELETCT 223   MPV 10.4     Recent Labs     06/02/22 0320   SODIUM 136   POTASSIUM 3.9   CHLORIDE 97   CO2 25   GLUCOSE 104*   BUN 29*   CREATININE 1.72*   CALCIUM 10.4*     Recent Labs     06/02/22  0320   ALTSGPT 19   ASTSGOT 39   ALKPHOSPHAT 93   TBILIRUBIN 0.4   LIPASE 14   GLUCOSE 104*         Recent Labs     06/02/22  0320   NTPROBNP 129*         Recent Labs     06/02/22  0320   TROPONINT 9       Imaging:  DX-CHEST-PORTABLE (1 VIEW)   Final Result      No acute cardiac or pulmonary abnormalities are identified.              Assessment/Plan:  Justification for Admission Status  I anticipate this patient is appropriate for observation status at this time because FLOERS    * FLORES (acute kidney injury) (HCC)- (present on admission)  Assessment & Plan  -Observation status medical floor.  -Recurrent dehydration with ongoing diarrhea.  PICC line-today creatinine is 1.72.  2 prior admissions with DKA this month.  -Received 2 L of IV fluids and I will continue with normal saline at 100 cc an hour.  -Follow-up morning labs.    Anxiety disorder due to multiple medical problems- (present on admission)  Assessment & Plan  -On trazodone    Crohn's disease of small intestine with complication (HCC)- (present on admission)  Assessment &  Plan  -She follows with Dr. Cruz, status post ileostomy.  Allergic to AZA and steroids      DDD (degenerative disc disease), lumbar- (present on admission)  Assessment & Plan  Continue oxycodone and Tylenol.    Ileostomy in place (HCC)- (present on admission)  Assessment & Plan  -Plan as above.      VTE prophylaxis: SCDs/TEDs

## 2022-06-02 NOTE — PROGRESS NOTES
"[facility] Medicine Daily Progress Note    Date of Service  6/2/2022    Chief Complaint  Jana Overton is a 66 y.o. female admitted 6/2/2022 with   Chief Complaint   Patient presents with   • N/V     67 y/o female presents with a chief complaint of nausea, and vomiting. Per patient she has been having nausea, and vomiting for several months, but worsening over the evening. Patient stated that she attempted to self treat with zofran without relief. Patient stated that her last dose of zofran was at 1800.    • Leg Swelling     Patient stated that she has increased swelling to her bilateral lower legs. Patient stated that she was having difficulty ambulating due to the swelling, stating that it feels as if her legs are buckling.   • Skin Lesion     Patient stated that she is concerns about bed sores to her lower back and glutes.    • Diarrhea     Patient stated increased ostomy output.     Hospital Course  66 year old female with PMH of Crohn with ileostomy, followed by Dr. Cruz, chronic pain syndrome on Percocet 10mg at home, COPD, pA-fib, HTN, recent pancreatitis and FLORES admitted 06/02/2022 for intractable nausea and vomiting. In ED, patient was positive for THC use, FLORES Cr 1.72 up from last lab of 0.87, elevated calcium of 10.4.    Interval Problem Update  Patient reporting \"vomiting\" from food from the back of her throat, she feels it is not vomiting from the stomach.  She reported long hx of Crohn's and difficult to treat.  She was wondering if she would be discharged by tomorrow.    I have personally seen and examined the patient at bedside. I discussed the plan of care with patient, family, bedside RN, charge RN,  and pharmacy.    Consultants/Specialty  none    Code Status  Full Code    Disposition  Patient is not medically cleared for discharge.   Anticipate discharge to to home with close outpatient follow-up.  I have placed the appropriate orders for post-discharge needs.    Review of " Systems  Review of Systems   Gastrointestinal: Positive for nausea and vomiting. Negative for abdominal pain.   Musculoskeletal: Positive for joint pain.        Physical Exam  Temp:  [36.3 °C (97.3 °F)-36.8 °C (98.2 °F)] 36.8 °C (98.2 °F)  Pulse:  [] 88  Resp:  [16-20] 18  BP: (104-159)/(64-95) 104/69  SpO2:  [79 %-100 %] 100 %    Physical Exam  Vitals and nursing note reviewed.   Constitutional:       General: She is not in acute distress.     Appearance: Normal appearance. She is not ill-appearing.   Cardiovascular:      Rate and Rhythm: Normal rate.      Pulses: Normal pulses.      Heart sounds: Normal heart sounds. No murmur heard.  Pulmonary:      Effort: Pulmonary effort is normal. No respiratory distress.      Breath sounds: Normal breath sounds.   Abdominal:      General: Bowel sounds are normal. There is no distension.      Palpations: Abdomen is soft.      Comments: Ileostomy bag in place   Musculoskeletal:         General: No swelling or tenderness. Normal range of motion.   Skin:     General: Skin is warm.      Capillary Refill: Capillary refill takes less than 2 seconds.      Coloration: Skin is not jaundiced.      Findings: No erythema.   Neurological:      General: No focal deficit present.      Mental Status: She is alert and oriented to person, place, and time. Mental status is at baseline.      Motor: No weakness.   Psychiatric:         Mood and Affect: Mood normal.         Behavior: Behavior normal.         Thought Content: Thought content normal.         Judgment: Judgment normal.         Fluids  No intake or output data in the 24 hours ending 06/02/22 1110    Laboratory  Recent Labs     06/02/22  0320   WBC 6.1   RBC 4.07*   HEMOGLOBIN 12.6   HEMATOCRIT 38.6   MCV 94.8   MCH 31.0   MCHC 32.6*   RDW 43.1   PLATELETCT 223   MPV 10.4     Recent Labs     06/02/22  0320   SODIUM 136   POTASSIUM 3.9   CHLORIDE 97   CO2 25   GLUCOSE 104*   BUN 29*   CREATININE 1.72*   CALCIUM 10.4*                    Imaging  DX-CHEST-PORTABLE (1 VIEW)   Final Result      No acute cardiac or pulmonary abnormalities are identified.           Assessment/Plan  * FLORES (acute kidney injury) (HCC)- (present on admission)  Assessment & Plan  -Observation status medical floor.  -Recurrent dehydration with ongoing diarrhea.  PICC line-today creatinine is 1.72.  2 prior admissions with DKA this month.  -Received 2 L of IV fluids and I will continue with normal saline at 100 cc an hour.  -Follow-up morning labs.    Oropharyngeal dysphagia- (present on admission)  Assessment & Plan  Patient complaining about oropharyngeal vomiting  - ordered speech therapy eval possible FEES    Mild tetrahydrocannabinol (THC) abuse- (present on admission)  Assessment & Plan  Positive on UDS  Does not correlate with patient's oropharyngeal dysphagia    Anxiety disorder due to multiple medical problems- (present on admission)  Assessment & Plan  -On trazodone    DDD (degenerative disc disease), lumbar- (present on admission)  Assessment & Plan  Continue oxycodone and Tylenol.    Ileostomy in place (HCC)- (present on admission)  Assessment & Plan  -Plan as above.    Crohn's disease of small intestine with complication (HCC)- (present on admission)  Assessment & Plan  -She follows with Dr. Cruz, status post ileostomy.  Allergic to AZA and steroids         VTE prophylaxis: SCDs/TEDs    I have performed a physical exam and reviewed and updated ROS and Plan today (6/2/2022). In review of yesterday's note (6/1/2022), there are no changes except as documented above.

## 2022-06-02 NOTE — ED PROVIDER NOTES
ED Provider Note  ED Provider Note    Scribed for Bennett Maya by Bennett Maya. 6/2/2022  2:33 AM    Primary care provider: Chase Charles D.O.  Means of arrival: Private vehicle  History obtained from: Patient  History limited by: None    CHIEF COMPLAINT  Chief Complaint   Patient presents with   • N/V     67 y/o female presents with a chief complaint of nausea, and vomiting. Per patient she has been having nausea, and vomiting for several months, but worsening over the evening. Patient stated that she attempted to self treat with zofran without relief. Patient stated that her last dose of zofran was at 1800.    • Leg Swelling     Patient stated that she has increased swelling to her bilateral lower legs. Patient stated that she was having difficulty ambulating due to the swelling, stating that it feels as if her legs are buckling.   • Skin Lesion     Patient stated that she is concerns about bed sores to her lower back and glutes.    • Diarrhea     Patient stated increased ostomy output.     CAN Overton is a 66 y.o. female who presents to the Emergency Department with a history of multiple autoimmune diseases, rheumatoid arthritis, lupus and Crohn's disease.  Has an ileostomy in place.  I recently saw her on the 19th for dehydration, lactic acidosis and steroid-induced psychosis.  She was admitted on the 20th for 2 days, apparently discharged and readmitted on the 23rd with a mild case of pancreatitis.  States she was discharged on the 25th.  Since then she has been having persistent symptoms.  She also thinks she may have had a COVID exposure.  She reports acute on chronic nausea, vomiting, increased output from her ileostomy, body aches and acute on chronic pain.  She states that her lower extremities have severe neuropathy which is baseline but also thinks that there might be some mild swelling.  Denies chest pain or shortness of breath or cough.  She reports epigastric pain tonight.   Persistent nausea and vomiting, nonbloody, nonbilious.  Quality: Sharp epigastric pain  Duration: 24 hours  Severity: Severe  Associated sx: Nausea vomiting    REVIEW OF SYSTEMS  As above, all other systems reviewed and are negative.   See HPI for further details.     PAST MEDICAL HISTORY   has a past medical history of Anesthesia, Anxiety, Arthritis, ASTHMA, Atrial fib/flut, Backpain, Bronchitis, Chronic pain, Colostomy in place ([facility]) (10/14/2010), COPD (chronic obstructive pulmonary disease) ([facility]) (6/24/2014), COPD (chronic obstructive pulmonary disease) ([facility]) (6/24/2014), Crohn's disease of colon ([facility]), Dyspnea, History of cardiac murmur, Hypokalemia (6/24/2014), Ileostomy present ([facility]), Infectious disease, Multiple falls (6/24/2014), Narcotic dependence ([facility]) (9/23/2009), Obstruction, Other specified disorder of intestines, Pain (7/11/13), Pneumonia, Postherpetic neuralgia (6/24/2014), Rosacea (6/24/2014), and Sleep apnea.  SURGICAL HISTORY   has a past surgical history that includes lumbar fusion anterior; cervical disk and fusion anterior; foot surgery; hand surgery; other abdominal surgery; gastroscopy with biopsy (11/22/08); ileostomy (11/11/2009); dilation and curettage (9/24/2010); gastroscopy (10/4/2011); colonoscopy (10/4/2011); hardware removal neuro (7/16/2012); mammoplasty reduction (7/17/2013); ileostomy (5/14/2014); breast reduction; abdominal exploration; lumpectomy; colon resection; ileostomy (12/29/2018); sigmoidoscopy flex (12/29/2018); exploratory laparotomy (12/29/2018); gastroscopy (1/6/2019); gastroscopy (N/A, 5/22/2019); ileostomy (1/20/2021); lysis adhesions general (1/20/2021); cysto/uretero/pyeloscopy, dx (Right, 12/8/2021); cysto stent placemnt pre surg (Right, 12/8/2021); cystoscopy,insert ureteral stent (Right, 12/23/2021); and cysto/uretero/pyeloscopy, dx (Right, 12/23/2021).  SOCIAL HISTORY  Social History     Tobacco Use   • Smoking status: Never Smoker   • Smokeless tobacco:  "Never Used   • Tobacco comment: second hand smoke parents - smoked for only 1 year many years ago   Vaping Use   • Vaping Use: Never used   Substance Use Topics   • Alcohol use: Not Currently     Alcohol/week: 0.6 oz     Types: 1 Shots of liquor per week     Comment: gin and half a lime; tonic water   • Drug use: Not Currently     Types: Marijuana, Inhaled     Comment: medical marijuana through bong      Social History     Substance and Sexual Activity   Drug Use Not Currently   • Types: Marijuana, Inhaled    Comment: medical marijuana through bong     FAMILY HISTORY  Family History   Problem Relation Age of Onset   • Lung Disease Mother         copd   • Diabetes Father    • Heart Disease Father    • Diabetes Sister    • Cancer Maternal Aunt 40        breast     CURRENT MEDICATIONS  Home Medications    **Home medications have not yet been reviewed for this encounter**       ALLERGIES  Allergies   Allergen Reactions   • Azathioprine Sodium Hives and Shortness of Breath   • Infliximab Hives, Shortness of Breath and Rash     .   • Methotrexate Hives, Shortness of Breath and Rash     .   • Methotrexate Hives, Rash and Shortness of Breath     .   • Morphine Shortness of Breath, Swelling and Palpitations   • Promethazine Shortness of Breath and Rash     .   • Roxanol [Morphine Sulfate] Swelling     Throat swells   • Amitriptyline Unspecified     Nightmares     • Carafate [Sucralfate] Vomiting and Nausea     Generalizes/Mouth Sores   • Demerol Vomiting   • Duragesic [Fentanyl]      \"Patches didn't work\"  Skin broke down   • Fentanyl Unspecified     Patches caused skin breakdown, no pain relief   • Lorazepam Unspecified     States give me nightmares.    • Meperidine Vomiting   • Methadone Unspecified     Didn't work   • Methadone Unspecified     Didn't work   • Other Drug Unspecified     steroids   • Pregabalin Rash     Rash     • Pseudoephedrine Vomiting   • Sulfa Drugs Hives and Rash     .  .   • Betamethasone Unspecified "     Pt unable to remember   • Celestone [Betamethasone Sodium Phosphate] Unspecified     Pt unable to remember   • Sulfasalazine Rash     On chest     PHYSICAL EXAM    VITAL SIGNS:   Vitals:    06/02/22 0338 06/02/22 0355 06/02/22 0401 06/02/22 0425   BP: (!) 151/75 (!) 157/72  (!) 150/69   Pulse: 100 100 98    Resp:       Temp:       TempSrc:       SpO2: 99% 93% 99%    Weight:       Height:         Vitals: My interpretation: normotensive, tachycardic, afebrile, not hypoxic    Reinterpretation of vitals: Improved    Cardiac Monitor Interpretation: The cardiac monitor revealed normal Sinus Rhythm with tachycardia as interpreted by me. The cardiac monitor was ordered secondary to the patient's history of abdominal pain, dehydration, nausea vomiting and to monitor for dysrhythmia and/or tachycardia.    PE:   Constitutional: Chronically ill-appearing, in mild to moderate distress from pain  HENT: Normocephalic, Atraumatic, Bilateral external ears normal, Oropharynx is clear mucous membranes are moist. No oral exudates or nasal discharge.   Eyes: Pupils are equal round and reactive, EOMI, Conjunctiva normal, No discharge.   Neck: Normal range of motion, No tenderness, Supple, No stridor. No meningismus.  Lymphatic: No lymphadenopathy noted.   Cardiovascular: Tachycardic rate and rhythm without murmur rub or gallop.  Thorax & Lungs: Clear breath sounds bilaterally without wheezes, rhonchi or rales. There is no chest wall tenderness.   Abdomen: Soft tenderness in the epigastrium, moderate generalized tenderness throughout, ileostomy in place, non-distended. There is no rebound or guarding. No organomegaly is appreciated. Bowel sounds are normal.  Skin: Normal without rash.   Back: No CVA or spinal tenderness.   Extremities: Intact distal pulses, very scant almost unnoticeable trace edema of the lower extremities, No tenderness, No cyanosis, No clubbing. Capillary refill is less than 2 seconds.  Musculoskeletal: Good range  of motion in all major joints. No tenderness to palpation or major deformities noted.   Neurologic: Alert & oriented x 3, Normal motor function, Normal sensory function, No focal deficits noted. Reflexes are normal.  Psychiatric: Affect anxious, Judgment normal, Mood normal. There is no suicidal ideation or patient reported hallucinations.  Moderately tangential thought process    DIAGNOSTIC STUDIES / PROCEDURES    LABS  Results for orders placed or performed during the hospital encounter of 06/02/22   CBC WITH DIFFERENTIAL   Result Value Ref Range    WBC 6.1 4.8 - 10.8 K/uL    RBC 4.07 (L) 4.20 - 5.40 M/uL    Hemoglobin 12.6 12.0 - 16.0 g/dL    Hematocrit 38.6 37.0 - 47.0 %    MCV 94.8 81.4 - 97.8 fL    MCH 31.0 27.0 - 33.0 pg    MCHC 32.6 (L) 33.6 - 35.0 g/dL    RDW 43.1 35.9 - 50.0 fL    Platelet Count 223 164 - 446 K/uL    MPV 10.4 9.0 - 12.9 fL    Neutrophils-Polys 57.70 44.00 - 72.00 %    Lymphocytes 28.80 22.00 - 41.00 %    Monocytes 10.40 0.00 - 13.40 %    Eosinophils 2.10 0.00 - 6.90 %    Basophils 0.70 0.00 - 1.80 %    Immature Granulocytes 0.30 0.00 - 0.90 %    Nucleated RBC 0.00 /100 WBC    Neutrophils (Absolute) 3.51 2.00 - 7.15 K/uL    Lymphs (Absolute) 1.75 1.00 - 4.80 K/uL    Monos (Absolute) 0.63 0.00 - 0.85 K/uL    Eos (Absolute) 0.13 0.00 - 0.51 K/uL    Baso (Absolute) 0.04 0.00 - 0.12 K/uL    Immature Granulocytes (abs) 0.02 0.00 - 0.11 K/uL    NRBC (Absolute) 0.00 K/uL   COMP METABOLIC PANEL   Result Value Ref Range    Sodium 136 135 - 145 mmol/L    Potassium 3.9 3.6 - 5.5 mmol/L    Chloride 97 96 - 112 mmol/L    Co2 25 20 - 33 mmol/L    Anion Gap 14.0 7.0 - 16.0    Glucose 104 (H) 65 - 99 mg/dL    Bun 29 (H) 8 - 22 mg/dL    Creatinine 1.72 (H) 0.50 - 1.40 mg/dL    Calcium 10.4 (H) 8.4 - 10.2 mg/dL    AST(SGOT) 39 12 - 45 U/L    ALT(SGPT) 19 2 - 50 U/L    Alkaline Phosphatase 93 30 - 99 U/L    Total Bilirubin 0.4 0.1 - 1.5 mg/dL    Albumin 4.4 3.2 - 4.9 g/dL    Total Protein 7.7 6.0 - 8.2 g/dL     Globulin 3.3 1.9 - 3.5 g/dL    A-G Ratio 1.3 g/dL   LIPASE   Result Value Ref Range    Lipase 14 7 - 58 U/L   URINALYSIS CULTURE, IF INDICATED    Specimen: Urine   Result Value Ref Range    Color Yellow     Character Clear     Specific Gravity 1.025 <1.035    Ph 5.5 5.0 - 8.0    Glucose Negative Negative mg/dL    Ketones 15 (A) Negative mg/dL    Protein Negative Negative mg/dL    Bilirubin Negative Negative    Nitrite Negative Negative    Leukocyte Esterase Negative Negative    Occult Blood Negative Negative    Micro Urine Req see below    TROPONIN   Result Value Ref Range    Troponin T 9 6 - 19 ng/L   proBrain Natriuretic Peptide, NT   Result Value Ref Range    NT-proBNP 129 (H) 0 - 125 pg/mL   CoV-2, FLU A/B, and RSV by PCR (2-4 Hours CEPHEID) : Collect NP swab in VTM    Specimen: Respirate   Result Value Ref Range    Influenza virus A RNA Negative Negative    Influenza virus B, PCR Negative Negative    RSV, PCR Negative Negative    SARS-CoV-2 by PCR NotDetected     SARS-CoV-2 Source Nasal Swab    URINE DRUG SCREEN   Result Value Ref Range    Amphetamines Urine Negative Negative    Barbiturates Negative Negative    Benzodiazepines Positive (A) Negative    Cocaine Metabolite Negative Negative    Methadone Negative Negative    Opiates Negative Negative    Oxycodone Positive (A) Negative    Phencyclidine -Pcp Negative Negative    Propoxyphene Negative Negative    Cannabinoid Metab Positive (A) Negative   LACTIC ACID   Result Value Ref Range    Lactic Acid 1.7 0.5 - 2.0 mmol/L   ETHYL ALCOHOL (BLOOD)   Result Value Ref Range    Diagnostic Alcohol <10.1 <10.1 mg/dL   ESTIMATED GFR   Result Value Ref Range    GFR (CKD-EPI) 32 (A) >60 mL/min/1.73 m 2   EKG (NOW)   Result Value Ref Range    Report       [facility] Emergency Dept.    Test Date:  2022-06-02  Pt Name:    YONATHAN JUDD                Department: [facility]  MRN:        [id number]                      Room:       -ROOM 6  Gender:      Female                       Technician: [id number]  :        [id number]                   Requested By:BENNETT WEEKS  Order #:    [id number]                    Reading MD: Bennett Weeks    Measurements  Intervals                                Axis  Rate:       94                           P:          63  CT:         193                          QRS:        23  QRSD:       81                           T:          41  QT:         357  QTc:        447    Interpretive Statements  Sinus rhythm  Probable left atrial enlargement  RSR' in V1 or V2, right VCD or RVH  Baseline wander in lead(s) II,aVR  Compared to ECG 2022 02:03:13  Right ventricular hypertrophy now present  RSR' in V1 or V2 now present  Intraventricular conduction delay no longer present  T-wave abnormalit y no longer present  Electronically Signed On 2022 3:48:01 PDT by Bennett Weeks       *Note: Due to a large number of results and/or encounters for the requested time period, some results have not been displayed. A complete set of results can be found in Results Review.      All labs reviewed by me. Significant for no leukocytosis, no anemia, normal electrolytes, FLORES present, normal liver enzymes, normal bilirubin, urinalysis without infection, does show mild ketones, troponin and BNP normal, flu and COVID-negative, urine drug screen positive for benzos, cannabinoid and oxycodone, lactic acid normal, alcohol level negative    RADIOLOGY  DX-CHEST-PORTABLE (1 VIEW)   Final Result      No acute cardiac or pulmonary abnormalities are identified.        The radiologist's interpretation of all radiological studies have been reviewed by me.    COURSE & MEDICAL DECISION MAKING  Nursing notes, VS, PMSFHx, labs, imaging, EKG reviewed in chart.    Heart Score: Low    MDM: 2:33 AM Jana Overton is a 66 y.o. female who presented with recurrent chronic intractable nausea, vomiting and increased diarrheal output from her ileostomy.  This is the  third time I have seen the patient.  She has been admitted twice in the past week for persistent symptoms.  Today she does appear mildly dehydrated.  Her vital signs initially show some mild hypertension and tachycardia and she started on IV fluids.  Labs are concerning for a worsening FLORES with creatinine 1.7, up from a baseline of 0.7, her lactic acid is improved, her alcohol and drug screen negative for other than things that she takes, troponin EKG without ischemic changes no leukocytosis, no anemia electrolytes are reassuring.  She does seem to have a psychiatric component with tangential thought process.  She was admitted previously with steroid-induced psychosis which I observed today shows some mild improvement but she still seems to have difficulty with thought process.  She has a nonfocal neurological exam otherwise, no neurologic deficits and I doubt any neuro insult at this time.  Urinalysis did not show infection but did show mild ketones consistent with her state of dehydration.  She was given multiple rounds of antiemetics postop and persistent symptomatic nausea.  Will admit to the hospitalist for continued hydration, antiemetics and further guidance.  Patient updated and is amenable.    Peripheral Venous Access with US Guidance     Limited Diagnostic Exam: Venipuncture requiring physician skill with ultrasound guidance  Performed by self   Patient Identity confirmed: Yes  Risks, benefits and alternatives were discussed  Consent given by: patient   Indication for Exam: Vascular Access     Vein/Location: right ac fossa   Normal Compressibility and Visualized Patency   Catheter Size: 20  Number of Attempts: 1  Successful Catheter Insertion: yes  Complications: none    FINAL IMPRESSION  1. FLORES (acute kidney injury) (HCC) Acute   2. Tachycardia Acute   3. Dehydration Acute   4. Epigastric pain Acute   5. Body aches Acute   6. Intractable vomiting with nausea, unspecified vomiting type Acute   7. Diarrhea,  unspecified type Acute      The note accurately reflects work and decisions made by me.  Bennett Maya  6/2/2022  2:37 AM

## 2022-06-02 NOTE — PROGRESS NOTES
Pt arrived to 223-1 from ED. Assisted pt to bed. Able to ambulate standby with walker. No needs at this time.

## 2022-06-02 NOTE — ED NOTES
Patient appears to be sleeping. Eyes closed, RR even and unlabored. Patient repositions self on stretcher. NAD.

## 2022-06-02 NOTE — PROGRESS NOTES
4 Eyes Skin Assessment Completed by Vidhya RN and Ma, Nurse apprentice.    Head scattered red rash  Ears WDL  Nose WDL  Mouth WDL  Neck WDL  Breast/Chest WDL  Shoulder Blades WDL  Spine WDL  (R) Arm/Elbow/Hand WDL  (L) Arm/Elbow/Hand WDL  Abdomen Ileostomy  Groin WDL  Scrotum/Coccyx/Buttocks Redness, scab, possible pressure injury  (R) Leg scattered red rash  (L) Leg scattered red rash  (R) Heel/Foot/Toe redness, blanching  (L) Heel/Foot/Toe redness, blanching          Devices In Places Blood Pressure Cuff and Pulse Ox      Interventions In Place Pillows    Possible Skin Injury Yes    Pictures Uploaded Into Epic Yes  Wound Consult Placed Yes   RN Wound Prevention Protocol Ordered Yes

## 2022-06-02 NOTE — ASSESSMENT & PLAN NOTE
-Observation status medical floor.  -Recurrent dehydration with ongoing diarrhea.  PICC line-today creatinine is 1.72.  2 prior admissions with DKA this month.  -Received 2 L of IV fluids and I will continue with normal saline at 100 cc an hour.  -Follow-up morning labs.

## 2022-06-02 NOTE — HOSPITAL COURSE
66 year old female with PMH of Crohn with ileostomy, followed by Dr. Cruz, chronic pain syndrome on Percocet 10mg at home, COPD, pA-fib, HTN, recent pancreatitis and FLORES admitted 06/02/2022 for intractable nausea and vomiting. In ED, patient was positive for THC use, FLORES Cr 1.72 up from last lab of 0.87, elevated calcium of 10.4.

## 2022-06-03 ENCOUNTER — OFFICE VISIT (OUTPATIENT)
Dept: MEDICAL GROUP | Facility: LAB | Age: 67
End: 2022-06-03
Payer: MEDICARE

## 2022-06-03 VITALS
SYSTOLIC BLOOD PRESSURE: 124 MMHG | HEIGHT: 72 IN | BODY MASS INDEX: 20.99 KG/M2 | WEIGHT: 155 LBS | RESPIRATION RATE: 16 BRPM | TEMPERATURE: 98.9 F | DIASTOLIC BLOOD PRESSURE: 72 MMHG | HEART RATE: 102 BPM | OXYGEN SATURATION: 92 %

## 2022-06-03 DIAGNOSIS — E86.0 DEHYDRATION: ICD-10-CM

## 2022-06-03 DIAGNOSIS — L89.159 PRESSURE INJURY OF SKIN OF SACRAL REGION, UNSPECIFIED INJURY STAGE: ICD-10-CM

## 2022-06-03 PROCEDURE — 99214 OFFICE O/P EST MOD 30 MIN: CPT | Performed by: PHYSICIAN ASSISTANT

## 2022-06-03 RX ORDER — ZINC OXIDE
OINTMENT (GRAM) TOPICAL
Qty: 56 G | Refills: 3 | Status: SHIPPED | OUTPATIENT
Start: 2022-06-03 | End: 2022-07-08

## 2022-06-03 ASSESSMENT — FIBROSIS 4 INDEX: FIB4 SCORE: 2.73

## 2022-06-03 NOTE — CARE PLAN
Problem: Pain - Standard  Goal: Alleviation of pain or a reduction in pain to the patient’s comfort goal  Outcome: AMA     Problem: Knowledge Deficit - Standard  Goal: Patient and family/care givers will demonstrate understanding of plan of care, disease process/condition, diagnostic tests and medications  Outcome: AMA     Problem: Skin Integrity  Goal: Skin integrity is maintained or improved  Outcome: AMA   The patient is Stable - Low risk of patient condition declining or worsening    Shift Goals  Clinical Goals: Pt's pain level will remain 5 or less; pt's nausea will decrease    Progress made toward(s) clinical / shift goals:  N/a Pt left AMA.    Patient is not progressing towards the following goals:

## 2022-06-03 NOTE — PROGRESS NOTES
"Pt agitated that she had to move rooms. Once in the new room she requested pain and night medications, which I explained per orders what I could give her at the time and I would be in with it and also do my assessment. Pt refusing PO medications, although she was seen by Speech today and had diet modified to her home baseline diet, and have medications crushed in applesauce and puree. Explained this to patient. She states \"this is what happened last time I was here, its like saurav vu\"     Pt asked for IV to be removed and packed her belongings up.     MD Kayy made aware of patient's decision. Asked if there was anything we could do for her to stay. She said there was nothing that she could take better care of herself at home with her home medications and that she was going to [facility] the hospital.  MD states he will be up to talk to patient.   "
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
## 2022-06-03 NOTE — CARE PLAN
The patient is Stable - Low risk of patient condition declining or worsening    Shift Goals  Clinical Goals: (P) Pt's pain level will remain 5 or less; pt's nausea will decrease    Progress made toward(s) clinical / shift goals: Patient able to communicate pain needs. Medicated per MAR for pain as needed.    Problem: Pain - Standard  Goal: Alleviation of pain or a reduction in pain to the patient’s comfort goal  Outcome: Progressing     Problem: Knowledge Deficit - Standard  Goal: Patient and family/care givers will demonstrate understanding of plan of care, disease process/condition, diagnostic tests and medications  Outcome: Progressing       Patient is not progressing towards the following goals: n/a

## 2022-06-03 NOTE — PROGRESS NOTES
MD Kayy. Came to bedside. Pt still wanting to leave AMA.       at bedside, took all patients belongings to vehicle.    Patient then left with  via wheelchair.

## 2022-06-06 ENCOUNTER — TELEPHONE (OUTPATIENT)
Dept: MEDICAL GROUP | Facility: LAB | Age: 67
End: 2022-06-06

## 2022-06-06 ENCOUNTER — HOSPITAL ENCOUNTER (OUTPATIENT)
Dept: LAB | Facility: MEDICAL CENTER | Age: 67
End: 2022-06-06
Attending: INTERNAL MEDICINE
Payer: MEDICARE

## 2022-06-06 ENCOUNTER — HOSPITAL ENCOUNTER (OUTPATIENT)
Dept: LAB | Facility: MEDICAL CENTER | Age: 67
End: 2022-06-06
Attending: UROLOGY
Payer: MEDICARE

## 2022-06-06 ENCOUNTER — HOSPITAL ENCOUNTER (OUTPATIENT)
Dept: LAB | Facility: MEDICAL CENTER | Age: 67
End: 2022-06-06
Attending: PHYSICIAN ASSISTANT
Payer: MEDICARE

## 2022-06-06 DIAGNOSIS — M19.90 ARTHRITIS: ICD-10-CM

## 2022-06-06 DIAGNOSIS — E86.0 DEHYDRATION: ICD-10-CM

## 2022-06-06 LAB
ALBUMIN SERPL BCP-MCNC: 4.6 G/DL (ref 3.2–4.9)
ALBUMIN/GLOB SERPL: 1.3 G/DL
ALP SERPL-CCNC: 98 U/L (ref 30–99)
ALT SERPL-CCNC: 18 U/L (ref 2–50)
ANION GAP SERPL CALC-SCNC: 16 MMOL/L (ref 7–16)
AST SERPL-CCNC: 31 U/L (ref 12–45)
BASOPHILS # BLD AUTO: 1.1 % (ref 0–1.8)
BASOPHILS # BLD: 0.09 K/UL (ref 0–0.12)
BILIRUB SERPL-MCNC: 0.3 MG/DL (ref 0.1–1.5)
BUN SERPL-MCNC: 33 MG/DL (ref 8–22)
CALCIUM SERPL-MCNC: 10.2 MG/DL (ref 8.5–10.5)
CHLORIDE SERPL-SCNC: 101 MMOL/L (ref 96–112)
CO2 SERPL-SCNC: 21 MMOL/L (ref 20–33)
CREAT SERPL-MCNC: 1.6 MG/DL (ref 0.5–1.4)
CRP SERPL HS-MCNC: 0.84 MG/DL (ref 0–0.75)
EOSINOPHIL # BLD AUTO: 0.12 K/UL (ref 0–0.51)
EOSINOPHIL NFR BLD: 1.5 % (ref 0–6.9)
ERYTHROCYTE [DISTWIDTH] IN BLOOD BY AUTOMATED COUNT: 44.5 FL (ref 35.9–50)
ERYTHROCYTE [SEDIMENTATION RATE] IN BLOOD BY WESTERGREN METHOD: 27 MM/HOUR (ref 0–25)
GFR SERPLBLD CREATININE-BSD FMLA CKD-EPI: 35 ML/MIN/1.73 M 2
GLOBULIN SER CALC-MCNC: 3.6 G/DL (ref 1.9–3.5)
GLUCOSE SERPL-MCNC: 168 MG/DL (ref 65–99)
HCT VFR BLD AUTO: 43 % (ref 37–47)
HGB BLD-MCNC: 14.1 G/DL (ref 12–16)
IMM GRANULOCYTES # BLD AUTO: 0.03 K/UL (ref 0–0.11)
IMM GRANULOCYTES NFR BLD AUTO: 0.4 % (ref 0–0.9)
LYMPHOCYTES # BLD AUTO: 1.57 K/UL (ref 1–4.8)
LYMPHOCYTES NFR BLD: 20.1 % (ref 22–41)
MCH RBC QN AUTO: 30.7 PG (ref 27–33)
MCHC RBC AUTO-ENTMCNC: 32.8 G/DL (ref 33.6–35)
MCV RBC AUTO: 93.7 FL (ref 81.4–97.8)
MONOCYTES # BLD AUTO: 0.72 K/UL (ref 0–0.85)
MONOCYTES NFR BLD AUTO: 9.2 % (ref 0–13.4)
NEUTROPHILS # BLD AUTO: 5.3 K/UL (ref 2–7.15)
NEUTROPHILS NFR BLD: 67.7 % (ref 44–72)
NRBC # BLD AUTO: 0 K/UL
NRBC BLD-RTO: 0 /100 WBC
PLATELET # BLD AUTO: 307 K/UL (ref 164–446)
PMV BLD AUTO: 11.2 FL (ref 9–12.9)
POTASSIUM SERPL-SCNC: 3.6 MMOL/L (ref 3.6–5.5)
PROT SERPL-MCNC: 8.2 G/DL (ref 6–8.2)
RBC # BLD AUTO: 4.59 M/UL (ref 4.2–5.4)
RHEUMATOID FACT SER IA-ACNC: 11 IU/ML (ref 0–14)
SODIUM SERPL-SCNC: 138 MMOL/L (ref 135–145)
WBC # BLD AUTO: 7.8 K/UL (ref 4.8–10.8)

## 2022-06-06 PROCEDURE — 86431 RHEUMATOID FACTOR QUANT: CPT

## 2022-06-06 PROCEDURE — 86039 ANTINUCLEAR ANTIBODIES (ANA): CPT

## 2022-06-06 PROCEDURE — 86235 NUCLEAR ANTIGEN ANTIBODY: CPT | Mod: 91

## 2022-06-06 PROCEDURE — 86038 ANTINUCLEAR ANTIBODIES: CPT

## 2022-06-06 PROCEDURE — 80053 COMPREHEN METABOLIC PANEL: CPT

## 2022-06-06 PROCEDURE — 87077 CULTURE AEROBIC IDENTIFY: CPT

## 2022-06-06 PROCEDURE — 85025 COMPLETE CBC W/AUTO DIFF WBC: CPT

## 2022-06-06 PROCEDURE — 85652 RBC SED RATE AUTOMATED: CPT

## 2022-06-06 PROCEDURE — 86140 C-REACTIVE PROTEIN: CPT

## 2022-06-06 PROCEDURE — 87086 URINE CULTURE/COLONY COUNT: CPT

## 2022-06-06 PROCEDURE — 36415 COLL VENOUS BLD VENIPUNCTURE: CPT

## 2022-06-06 PROCEDURE — 86225 DNA ANTIBODY NATIVE: CPT

## 2022-06-06 NOTE — TELEPHONE ENCOUNTER
Notified by Jessica that apparently patient had been seen by another provider, needs lab work completed for possible problems with systemic lupus erythematosus.

## 2022-06-07 ENCOUNTER — TELEPHONE (OUTPATIENT)
Dept: CARDIOLOGY | Facility: MEDICAL CENTER | Age: 67
End: 2022-06-07
Payer: MEDICARE

## 2022-06-07 ENCOUNTER — OFFICE VISIT (OUTPATIENT)
Dept: CARDIOLOGY | Facility: MEDICAL CENTER | Age: 67
End: 2022-06-07
Payer: MEDICARE

## 2022-06-07 VITALS
HEIGHT: 72 IN | BODY MASS INDEX: 21.08 KG/M2 | OXYGEN SATURATION: 95 % | WEIGHT: 155.6 LBS | DIASTOLIC BLOOD PRESSURE: 60 MMHG | RESPIRATION RATE: 14 BRPM | HEART RATE: 95 BPM | SYSTOLIC BLOOD PRESSURE: 112 MMHG

## 2022-06-07 DIAGNOSIS — I10 ESSENTIAL HYPERTENSION: ICD-10-CM

## 2022-06-07 DIAGNOSIS — I48.0 PAROXYSMAL ATRIAL FIBRILLATION (HCC): ICD-10-CM

## 2022-06-07 DIAGNOSIS — I82.431 ACUTE DEEP VEIN THROMBOSIS (DVT) OF POPLITEAL VEIN OF RIGHT LOWER EXTREMITY (HCC): ICD-10-CM

## 2022-06-07 DIAGNOSIS — J96.11 CHRONIC RESPIRATORY FAILURE WITH HYPOXIA (HCC): ICD-10-CM

## 2022-06-07 DIAGNOSIS — I50.32 CHRONIC DIASTOLIC CONGESTIVE HEART FAILURE (HCC): ICD-10-CM

## 2022-06-07 PROCEDURE — 99214 OFFICE O/P EST MOD 30 MIN: CPT | Performed by: INTERNAL MEDICINE

## 2022-06-07 RX ORDER — FUROSEMIDE 20 MG/1
TABLET ORAL
COMMUNITY
End: 2022-06-07

## 2022-06-07 RX ORDER — POTASSIUM CHLORIDE 20 MEQ/1
20 TABLET, EXTENDED RELEASE ORAL
Qty: 90 TABLET | Refills: 3 | Status: ON HOLD
Start: 2022-06-07 | End: 2023-02-08

## 2022-06-07 RX ORDER — HYDRALAZINE HYDROCHLORIDE 10 MG/1
10 TABLET, FILM COATED ORAL
COMMUNITY
End: 2022-06-07

## 2022-06-07 RX ORDER — HYDRALAZINE HYDROCHLORIDE 10 MG/1
10 TABLET, FILM COATED ORAL 3 TIMES DAILY PRN
Qty: 270 TABLET | Refills: 3 | Status: SHIPPED
Start: 2022-06-07 | End: 2022-10-21

## 2022-06-07 RX ORDER — FUROSEMIDE 20 MG/1
20 TABLET ORAL
Qty: 90 TABLET | Refills: 3 | Status: ON HOLD | OUTPATIENT
Start: 2022-06-07 | End: 2023-10-26

## 2022-06-07 ASSESSMENT — FIBROSIS 4 INDEX: FIB4 SCORE: 1.62

## 2022-06-07 NOTE — TELEPHONE ENCOUNTER
"Called patient mobile number and spoke to the patient.   Having trouble talking, she was \"Choking a few minutes ago\", in hospital BP was high.  Gave phone to .  BP above 150, pulse rate been .  Metoprolol 50mg BID  Spironolactone 50mg daily  Hydralazine 10mg, SBP over 150.   Has been diagnosed with RA, trying to go to the [facility].   Symptoms: chest tightness at times, heart racing when HR is up, gets shaky, chron's disease kidney and pancreas issues.   Hospital told her to stop taking ASA 81mg daily.  Everyday swelling, SOB and weight gain. She stated she has gained 20 pounds since leaving the hospital on 05/28/22, she stated she is \"having trouble peeing right now, I am so dry\".   [employer] will be at their house at 1pm, adivsed I will notify [facility] and get back to her as soon as possible. Answered all questions and concerns, appreciative of call.       [facility] has opening at 3:20pm.   Called patient mobile number back and LM to callback.   Called patient home number and spoke to the patient, advised JM has opening at 3:20pm, and she stated \"I will be there\". Gave address, location and advised face covering. Answered all questions and concerns, appreciative of call.     Notified Nithya FINN MA.           "

## 2022-06-07 NOTE — TELEPHONE ENCOUNTER
FELICITA      Caller: Jana    Reported Symptoms: Pt called and stated she been having a high bp and pulse rate lately.    Recent Blood Pressure/Heart Rate Reading: 160's/170's & pulse rate ranging from     Callback Number: [phone]

## 2022-06-07 NOTE — PATIENT INSTRUCTIONS
Please stop spironolactone for 1 week.     Please get non-fasting blood tests to check your kidney function in 1 week.     Please increase your intake of salt and nutritional supplements.

## 2022-06-07 NOTE — PROGRESS NOTES
Cardiology Follow-up Consultation Note    Date of note:  6/7/2022  Primary Care Provider: Amanda Bazzi M.D.    Name:             Jana Overton   YOB: 1955  MRN:               [id number]    CC: palpitations.      Patient ID/HPI:   Jana Overton is a 66 y.o. female whose current medical problems include hypertension,  DVT, parosxysmal atrial fibrillation, TRUDY, COPD, and crohn's disease who is here for follow-up.     Clinic visit: 12/27/2017, at that time:     She also has moderate severity left sided chest tightness which radiates down her left arm. This is not necessarily associated with exertion.  She has been told to go to the emergency room multiple times for evaluation, however she continues to refuse as she's scared of getting inadequate treatment there.     SBP at home occasionally down to 70s/40s, HR up to 160s. + LE edema     She is currently not taking metoprolol or lasix. She does not take aspirin daily as it causes severe burning in her stomach.    Does see a pain specialist Dr. Telles and has been working on down-titrating opiates.     At our clinic visit: 12/29/2017  She still has chest pain, occasionally pressure-like at rest, resolves within minutes, also has sharp chest pain at times, associated with movement.     At our visit, 1/24/2018:  Pulse does go very high with exercise up to 140s with very mild exercise. Ziopatch showed periodic SVT and likely atrial fibrillation.  Still having palpitation daily.  No passing out, no falls.     In terms of TRUDY, she started her CPAP and feels much better in terms of her energy level and concentration.     In terms of her hypertension, 90s-100s/60s. Highest is 140s/90s.     Taking torsemide once a week along with potassium.     Still having moderate severity right sided chest pain associated with palpitations and ear pounding.     At our clinic visit, 5/30/2019:  2 days of drenching sweats, severe fatigue yesterday. Associated with  palpitations.  Up to taking metoprolol 25mg four times a day. Usual sitting pulse is 60, last was in the 100s.     Has also had nausea and anorexia and likely crohn's flare. Followed by Dr. Mahan and Dr. Sepulveda.      BP at home in the 110s-160s.  No syncope.     Currently with minimal cardiac symptoms, continues to have abdominal pain.     At our visit, 8/25/2020:  Had multiple crohn's flares and heat stroke since our last visit. Currently feeling well.      Just weaned herself off oxycodone.     In terms of a fib, since she's feeling well, no a fib recently.     At our visit, 5/27/2021:  When she climbs at elevation, she does get some chest tightness that resolves with rest and is associated with dizziness. When swimming she does ok.     Hypertension well controlled.     Does have palpitations and orthostatic dizziness at times.     Interim History:  Admitted 6/2/2022 for n/v and FLORES. She left AMA. This was her third hospitalization in the last month for similar compliants. Before that hospitalized for kidney stones back in December. She left AMA when she did not get IV diluadid per notes.  Weight up 20 pounds in the hospital.     Here with her  Goyo.     Pulse is in the 90s-120s. FLORES continues. Continues to have worsening diarrhea. COVID and flu were negative. Worried because home Bps are in the 150s.         ROS  No fevers    Past Medical History:   Diagnosis Date   • Anesthesia     difficult Iv stick   • Anxiety    • Arthritis     all over   • ASTHMA    • Atrial fib/flut    • Backpain    • Bronchitis     5 years   • Chronic pain    • Colostomy in place ([facility]) 10/14/2010   • COPD (chronic obstructive pulmonary disease) ([facility]) 6/24/2014   • COPD (chronic obstructive pulmonary disease) ([facility]) 6/24/2014   • Crohn's disease of colon ([facility]ly [facility])    • Dyspnea    • History of cardiac murmur    • Hypokalemia 6/24/2014   • Ileostomy present ([facility])    • Infectious disease     MRSA, VancoRSA   • Multiple falls 6/24/2014   •  Narcotic dependence (HCC) 9/23/2009   • Obstruction    • Other specified disorder of intestines     crohns disease   • Pain 7/11/13    all over   • Pneumonia     child and mid 30's   • Postherpetic neuralgia 6/24/2014   • Rosacea 6/24/2014   • Sleep apnea          Past Surgical History:   Procedure Laterality Date   • ME CYSTOSCOPY,INSERT URETERAL STENT Right 12/23/2021    Procedure: CYSTOSCOPY, WITH URETERAL STENT EXCHANGE;  Surgeon: Jonathan Turcios M.D.;  Location: [facility];  Service: Urology   • ME CYSTO/URETERO/PYELOSCOPY, DX Right 12/23/2021    Procedure: URETEROSCOPY;  Surgeon: Jonathan Turcios M.D.;  Location: [facility];  Service: Urology   • ME CYSTO/URETERO/PYELOSCOPY, DX Right 12/8/2021    Procedure: URETEROSCOPY;  Surgeon: Luc Hook M.D.;  Location: [facility];  Service: Urology   • CYSTO STENT PLACEMNT PRE SURG Right 12/8/2021    Procedure: CYSTOSCOPY, WITH URETERAL STENT INSERTION;  Surgeon: Luc Hook M.D.;  Location: [facility];  Service: Urology   • ILEOSTOMY  1/20/2021    Procedure: ILEOSTOMY- COMPLEX REVISION,;  Surgeon: Kevin Cruz M.D.;  Location: [facility];  Service: General   • LYSIS ADHESIONS GENERAL  1/20/2021    Procedure: LYSIS, ADHESIONS;  Surgeon: Kevin Cruz M.D.;  Location: [facility];  Service: General   • GASTROSCOPY N/A 5/22/2019    Procedure: GASTROSCOPY - WITH DILATION;  Surgeon: Dionicio Cardona M.D.;  Location: [facility];  Service: Gastroenterology   • GASTROSCOPY  1/6/2019    Procedure: GASTROSCOPY- WITH DILATION;  Surgeon: Daniel Daniels M.D.;  Location: [facility];  Service: Gastroenterology   • ILEOSTOMY  12/29/2018    Procedure: ILEOSTOMY- REVISION;  Surgeon: Kevin Cruz M.D.;  Location: [facility];  Service: General   • SIGMOIDOSCOPY FLEX  12/29/2018    Procedure: SIGMOIDOSCOPY FLEX;  Surgeon: Kevin Cruz M.D.;  Location: SURGERY [facility];   Service: General   • EXPLORATORY LAPAROTOMY  12/29/2018    Procedure: EXPLORATORY LAPAROTOMY, lysis of adhesions;  Surgeon: Kevin Cruz M.D.;  Location: SURGERY [facility];  Service: General   • ILEOSTOMY  5/14/2014    Performed by Kevin Cruz M.D. at SURGERY [facility]   • MAMMOPLASTY REDUCTION  7/17/2013    Performed by Janey Burt M.D. at SURGERY [facility]   • HARDWARE REMOVAL NEURO  7/16/2012    Performed by KEELEY KIM at SURGERY [facility] ORS   • GASTROSCOPY  10/4/2011    Performed by TYSON MARTINEZ at SURGERY SAME DAY [facility] ORS   • COLONOSCOPY  10/4/2011    Performed by TYSON MARTINEZ at SURGERY SAME DAY [facility] ORS   • DILATION AND CURETTAGE  9/24/2010    Performed by GAURAV ALY at SURGERY SAME DAY [facility] ORS   • ILEOSTOMY  11/11/2009    Performed by KEVIN CRUZ at SURGERY [facility]   • GASTROSCOPY WITH BIOPSY  11/22/08    Performed by NEGRITA HUYNH at SURGERY [facility]   • ABDOMINAL EXPLORATION     • CERVICAL DISK AND FUSION ANTERIOR     • COLON RESECTION     • FOOT SURGERY     • HAND SURGERY     • LUMBAR FUSION ANTERIOR     • LUMPECTOMY     • OTHER ABDOMINAL SURGERY      surgery for chrons disease   • AR BREAST REDUCTION           Current Outpatient Medications   Medication Sig Dispense Refill   • hydrALAZINE (APRESOLINE) 10 MG Tab Take 10 mg by mouth. Only taken when blood pressure gets above 150     • ondansetron (ZOFRAN ODT) 4 MG TABLET DISPERSIBLE Take 1 Tablet by mouth every 6 hours as needed for Nausea. 20 Tablet 0   • metoprolol tartrate (LOPRESSOR) 50 MG Tab TAKE 1 TABLET BY MOUTH TWICE DAILY, HOLD IF BLOOD PRESSURE LESS THAN 95/50 (Patient taking differently: Take 50 mg by mouth 2 times a day. TAKE 1 TABLET BY MOUTH TWICE DAILY, HOLD IF BLOOD PRESSURE LESS THAN 95/50) 180 Tablet 0   • spironolactone (ALDACTONE) 25 MG Tab Take 2 Tablets by mouth every day. 180 Tablet 3   • prochlorperazine (COMPAZINE) 10 MG Tab Take 1  "Tablet by mouth 1 time a day as needed. (Patient taking differently: Take 10 mg by mouth 1 time a day as needed for Nausea/Vomiting.) 10 Tablet 0   • oxyCODONE immediate release (ROXICODONE) 10 MG immediate release tablet Take 10 mg by mouth every four hours as needed for Severe Pain. Indications: Chronic Pain     • Probiotic Product (PROBIOTIC PO) Take 2 Capsules by mouth every day.     • rivaroxaban (XARELTO) 20 MG Tab tablet Xarelto 20 mg tablet     • furosemide (LASIX) 20 MG Tab furosemide 20 mg tablet     • zinc oxide (DESITIN) 40 % Ointment Apply 5 gm portion to affected area QD (Patient not taking: Reported on 6/7/2022) 56 g 3   • diphenhydrAMINE-lidocaine-Maalox (MBX) oral susp cup Take 5 mL by mouth every 6 hours as needed (orAL pain). (Patient not taking: Reported on 6/7/2022) 900 mL 0   • traZODone (DESYREL) 50 MG Tab Take 50 mg by mouth one time. Pt only took one night (Patient not taking: Reported on 6/7/2022)       No current facility-administered medications for this visit.         Allergies   Allergen Reactions   • Azathioprine Sodium Hives and Shortness of Breath   • Infliximab Hives, Shortness of Breath and Rash     .   • Methotrexate Hives, Shortness of Breath and Rash     .   • Methotrexate Hives, Rash and Shortness of Breath     .   • Morphine Shortness of Breath, Swelling and Palpitations   • Promethazine Shortness of Breath and Rash     .   • Roxanol [Morphine Sulfate] Swelling     Throat swells   • Amitriptyline Unspecified     Nightmares     • Carafate [Sucralfate] Vomiting and Nausea     Generalizes/Mouth Sores   • Demerol Vomiting   • Fentanyl Unspecified     Patches caused skin breakdown, no pain relief   • Fentanyl      \"Patches didn't work\"  Skin broke down   • Lorazepam Unspecified     States give me nightmares.    • Meperidine Vomiting   • Methadone Unspecified     Didn't work   • Other Drug Unspecified     steroids   • Pregabalin Rash     Rash     • Pseudoephedrine Vomiting   • Sulfa " Drugs Hives and Rash     .  .   • Ketorolac Tromethamine    • Betamethasone Unspecified     Pt unable to remember   • Celestone [Betamethasone Sodium Phosphate] Unspecified     Pt unable to remember   • Sulfasalazine Rash     On chest         Family History   Problem Relation Age of Onset   • Lung Disease Mother         copd   • Diabetes Father    • Heart Disease Father    • Diabetes Sister    • Cancer Maternal Aunt 40        breast         Social History     Socioeconomic History   • Marital status:      Spouse name: Not on file   • Number of children: Not on file   • Years of education: Not on file   • Highest education level: Associate degree: academic program   Occupational History   • Not on file   Tobacco Use   • Smoking status: Never Smoker   • Smokeless tobacco: Never Used   • Tobacco comment: second hand smoke parents - smoked for only 1 year many years ago   Vaping Use   • Vaping Use: Never used   Substance and Sexual Activity   • Alcohol use: Not Currently     Alcohol/week: 0.6 oz     Types: 1 Shots of liquor per week     Comment: gin and half a lime; tonic water   • Drug use: Not Currently     Types: Marijuana, Inhaled     Comment: medical marijuana through bong   • Sexual activity: Not on file     Comment:    Other Topics Concern   • Not on file   Social History Narrative   • Not on file     Social Determinants of Health     Financial Resource Strain: Low Risk    • Difficulty of Paying Living Expenses: Not very hard   Food Insecurity: No Food Insecurity   • Worried About Running Out of Food in the Last Year: Never true   • Ran Out of Food in the Last Year: Never true   Transportation Needs: No Transportation Needs   • Lack of Transportation (Medical): No   • Lack of Transportation (Non-Medical): No   Physical Activity: Sufficiently Active   • Days of Exercise per Week: 7 days   • Minutes of Exercise per Session: 60 min   Stress: No Stress Concern Present   • Feeling of Stress : Not at all    Social Connections: Socially Integrated   • Frequency of Communication with Friends and Family: More than three times a week   • Frequency of Social Gatherings with Friends and Family: Once a week   • Attends Congregation Services: More than 4 times per year   • Active Member of Clubs or Organizations: Yes   • Attends Club or Organization Meetings: More than 4 times per year   • Marital Status:    Intimate Partner Violence: Not on file   Housing Stability: Low Risk    • Unable to Pay for Housing in the Last Year: No   • Number of Places Lived in the Last Year: 1   • Unstable Housing in the Last Year: No         Physical Exam:  Ambulatory Vitals  /60 (BP Location: Left arm, Patient Position: Sitting, BP Cuff Size: Adult)   Pulse 95   Resp 14   Ht 1.829 m (6')   Wt 70.6 kg (155 lb 9.6 oz)   SpO2 95%    Oxygen Therapy:  Pulse Oximetry: 95 %  BP Readings from Last 4 Encounters:   06/07/22 112/60   06/03/22 124/72   06/02/22 (!) 157/60   05/25/22 (!) 181/91       Weight/BMI: Body mass index is 21.1 kg/m².  Wt Readings from Last 4 Encounters:   06/07/22 70.6 kg (155 lb 9.6 oz)   06/03/22 70.3 kg (155 lb)   06/02/22 72.6 kg (160 lb)   05/23/22 72.6 kg (160 lb)         General: No apparent distress  Eyes: nl conjunctiva  ENT: OP covered by mask  Neck: JVP 2-3 cm H2O  Lungs: normal respiratory effort, CTAB  Heart: RRR, no murmurs, no rubs or gallops, no edema bilateral lower extremities. No LV/RV heave on cardiac palpatation. 2+ bilateral radial pulses.     Abdomen: soft  Extremities/MSK: no clubbing, no cyanosis  Neurological: No focal sensory deficits  Psychiatric: Appropriate affect, A/O x 3, intact judgement and insight  Skin: Warm extremities            Lab Data Review:  Lab Results   Component Value Date/Time    CHOLSTRLTOT 194 05/23/2022 02:36 AM    LDL 90 05/23/2022 02:36 AM    HDL 81 05/23/2022 02:36 AM    TRIGLYCERIDE 113 05/23/2022 02:36 AM       Lab Results   Component Value Date/Time    SODIUM  138 06/06/2022 08:45 AM    POTASSIUM 3.6 06/06/2022 08:45 AM    CHLORIDE 101 06/06/2022 08:45 AM    CO2 21 06/06/2022 08:45 AM    GLUCOSE 168 (H) 06/06/2022 08:45 AM    BUN 33 (H) 06/06/2022 08:45 AM    CREATININE 1.60 (H) 06/06/2022 08:45 AM    CREATININE 0.89 02/10/2010 04:04 PM    BUNCREATRAT 17 02/10/2010 04:04 PM    GLOMRATE >59 02/10/2010 04:04 PM     Lab Results   Component Value Date/Time    ALKPHOSPHAT 98 06/06/2022 08:45 AM    ASTSGOT 31 06/06/2022 08:45 AM    ALTSGPT 18 06/06/2022 08:45 AM    TBILIRUBIN 0.3 06/06/2022 08:45 AM      Lab Results   Component Value Date/Time    WBC 7.8 06/06/2022 08:45 AM    WBC 7.9 02/10/2010 04:04 PM     Lab Results   Component Value Date/Time    HBA1C 5.3 08/27/2015 01:50 PM     No components found for: TROP          Cardiac Imaging and Procedures Review:    EKG dated 12/26/2017 : My personal interpretation is NSR, LAE, RsR' in V1/V2       Ziopatch 12/2020:  CONCLUSIONS:   * 13 days and 9 hours of ziopatch recordings reviewed.   * Most symptoms associated with sinus rhythm in the 80s, however one episode was associated with heart rates up to 180s. Read as sinus by computer, but based on tracings I believe this was a supraventricular tachycardia. These were on 11/28 at around 2:35PM. Correlate clinically.   * No clear atrial fibrillation.   * Rare PACs/PVCs   * No ventricular tachycardia, automated read incorrectly interpreted artifact.       TTE (8/2021):  CONCLUSIONS  Normal left ventricular systolic function.   No evidence of valvular abnormality based on Doppler evaluation.   Compared to the images of the prior study done 12/27/17 -  there has   been no significant change.         Nuclear Perfusion Imaging (2013):   RESULTS:  1.  There were no new ECG changes and no arrhythmia.    2.  Heart rate did increase to 92 and the blood pressure negrito somewhat to   162/77 after an initial drop to 117 systolic but then returned to baseline.  3.  The raw data demonstrated no  unexpected extracardiac isotope uptake,   fairly dense collection of isotope in the right mid to lower abdomen.  4.  Rest-stress image comparison reveals no perfusion abnormalities in any of   the multiple SPECT image panels.  5.  The gated wall motion study demonstrated low cardiac volumes with very   small end systolic volume.  The global contractility was normal, EF 74%.    Regional contractility also normal.     SUMMARY:  Lexiscan stress myocardial perfusion stress imaging with technetium   99m tetrofosmin demonstratin.  No infarct or ischemia.  2.  Normal global and hyperdynamic regional function, EF 74%.  3.  No ECG changes or arrhythmia.  4.  No prior for comparison.              Radiology test Review:  CXR:   The mediastinal and cardiac silhouette is unremarkable.    The pulmonary vascularity is within normal limits.    The lung parenchyma demonstrates no airspace process. Calcified granuloma in the right lateral mid hemithorax is unchanged..    There is no significant pleural effusion.    There is no visible pneumothorax.    There are no acute bony abnormalities.   Impression        1.  No evidence of acute cardiopulmonary process.         PET 2018:  ELECTROCARDIOGRAPHIC FINDINGS:  EKG review shows a normal sinus rhythm with no   ischemic ST segment changes at rest or stress.     SCINTOGRAPHIC FINDINGS:  There is normal homogenous tracer uptake at rest and   stress.     GATED WALL MOTION FINDINGS:  Show an ejection fraction of 72% at rest, which   increases to 78% at peak stress.     CONCLUSIONS AND IMPRESSION:  Normal cardiac stress test.     [facility] 2021:  Coronary calcification:  LMA - 0.0  LCX - 0.0  LAD - 0.0  RCA - 0.0  PDA - 0.0     Total Calcium Score: 0.0            Medical Decision Makin. Atrial fibrillation, unspecified type (CMS-HCC)  Paroxysmal, asymptomatic.  Also has occasional short runs of symptomatic SVT.  given normotension at home, her [id number]-AGVm score of 2.  We previously  have discussed at length risk/benefit of anticoagulation given her history of crohns, and she has opted for only anticoagulation with aspirin at this time.  -she has stopped aspirin as well due to stomach discomfort.     -continue metoprolol to 50mg PO bid for rate control (she takes this 25mg qid at times which is fine). She does get symptomatic hypotension at higher doses.   -consider digoxin as an adjunctive agent if worsening symptomatic palpitations.     2. Other chest pain  Previous negative stress test and CCS of 0. Very unlikely cardiac.   -echo WNL.   -CTM    3. Essential (primary) hypertension   Likely white coat hypertension given very low readings at home that she has confirmed with her PCP's blood pressure cuff. Her Bps have recently been labile possibly due to dehydration and variable bowel habits  -cont spironolactone, currently at 50mg QAM. Stop for one week given dehydration on labs). Recheck labs in 1 week. Discussed increased salt intake  -hydralazine 10mg PO prn SBP >150  -continue metoprolol.      4. Leg swelling  -continue spironolactone daily. Stopped lasix/potassium prn as she has had some dehydration episodes. Leg swelling much improved today. Currently lasix prn.     5. TRUDY  -continue CPAP, many of symptoms much improved.           Return in about 3 months (around 9/7/2022). f/u in September as scheduled.       Harvey Monahan MD  [facility]  [facility], [address]  [address] [address]  [place] [address]  Phone: [phone]  Fax: [phone]

## 2022-06-08 ENCOUNTER — PATIENT MESSAGE (OUTPATIENT)
Dept: MEDICAL GROUP | Facility: LAB | Age: 67
End: 2022-06-08
Payer: MEDICARE

## 2022-06-08 DIAGNOSIS — E86.0 DEHYDRATION: ICD-10-CM

## 2022-06-08 LAB
BACTERIA UR CULT: NORMAL
NUCLEAR IGG SER QL IA: DETECTED
SIGNIFICANT IND 70042: NORMAL
SITE SITE: NORMAL
SOURCE SOURCE: NORMAL

## 2022-06-08 RX ORDER — NARATRIPTAN 2.5 MG/1
2.5 TABLET ORAL PRN
Qty: 5 TABLET | Refills: 3 | Status: SHIPPED | OUTPATIENT
Start: 2022-06-08 | End: 2022-06-15 | Stop reason: SDUPTHER

## 2022-06-10 ENCOUNTER — APPOINTMENT (OUTPATIENT)
Dept: MEDICAL GROUP | Facility: LAB | Age: 67
End: 2022-06-10
Payer: MEDICARE

## 2022-06-10 ENCOUNTER — HOSPITAL ENCOUNTER (EMERGENCY)
Facility: MEDICAL CENTER | Age: 67
End: 2022-06-11
Attending: EMERGENCY MEDICINE
Payer: MEDICARE

## 2022-06-10 ENCOUNTER — OFFICE VISIT (OUTPATIENT)
Dept: MEDICAL GROUP | Facility: LAB | Age: 67
End: 2022-06-10

## 2022-06-10 VITALS — HEART RATE: 100 BPM | HEIGHT: 72 IN | OXYGEN SATURATION: 94 % | TEMPERATURE: 97.9 F | BODY MASS INDEX: 21.1 KG/M2

## 2022-06-10 DIAGNOSIS — L89.151 DECUBITUS ULCER OF SACRAL REGION, STAGE 1: ICD-10-CM

## 2022-06-10 DIAGNOSIS — R07.9 CHEST PAIN, UNSPECIFIED TYPE: ICD-10-CM

## 2022-06-10 DIAGNOSIS — G43.909 MIGRAINE WITHOUT STATUS MIGRAINOSUS, NOT INTRACTABLE, UNSPECIFIED MIGRAINE TYPE: ICD-10-CM

## 2022-06-10 DIAGNOSIS — K13.79 RECURRENT ORAL ULCERS: ICD-10-CM

## 2022-06-10 DIAGNOSIS — R21 MALAR RASH: ICD-10-CM

## 2022-06-10 DIAGNOSIS — E86.0 DEHYDRATION: ICD-10-CM

## 2022-06-10 DIAGNOSIS — R76.8 POSITIVE ANA (ANTINUCLEAR ANTIBODY): ICD-10-CM

## 2022-06-10 DIAGNOSIS — M32.9 SLE EXACERBATION (HCC): ICD-10-CM

## 2022-06-10 DIAGNOSIS — R25.2 LEG CRAMPS: ICD-10-CM

## 2022-06-10 DIAGNOSIS — M19.90 ARTHRITIS: ICD-10-CM

## 2022-06-10 DIAGNOSIS — L89.151 PRESSURE INJURY OF SACRAL REGION, STAGE 1: ICD-10-CM

## 2022-06-10 LAB
ANA INTERPRETIVE COMMENT Q5143: ABNORMAL
ANA PATTERN Q5144: ABNORMAL
ANA TITER Q5145: ABNORMAL
ANTINUCLEAR ANTIBODY (ANA), HEP-2, IGG Q5142: DETECTED
ENA JO1 AB TITR SER: 3 AU/ML (ref 0–40)
ENA SCL70 IGG SER QL: 0 AU/ML (ref 0–40)
ENA SM IGG SER-ACNC: 1 AU/ML (ref 0–40)
ENA SS-B IGG SER IA-ACNC: 0 AU/ML (ref 0–40)
SSA52 R0ENA AB IGG Q0420: 0 AU/ML (ref 0–40)
SSA60 R0ENA AB IGG Q0419: 0 AU/ML (ref 0–40)

## 2022-06-10 PROCEDURE — 99285 EMERGENCY DEPT VISIT HI MDM: CPT

## 2022-06-10 PROCEDURE — 99214 OFFICE O/P EST MOD 30 MIN: CPT | Performed by: INTERNAL MEDICINE

## 2022-06-10 ASSESSMENT — FIBROSIS 4 INDEX: FIB4 SCORE: 1.62

## 2022-06-10 NOTE — PROGRESS NOTES
CC: Jana Overton is a 66 y.o. female is suffering from   Chief Complaint   Patient presents with   • Wound Infection     On buttocks   • Hospital Follow-up         SUBJECTIVE:  1. Positive VAHID (antinuclear antibody)  Patient and I have discussed that she continues to have problems with arthritis, malar rash, oral ulcers, probable lupus exacerbation.  Patient also is having problems emotionally.  Query possible problems with lupus cerebritis    2. SLE exacerbation (HCC)  Patient and I have discussed that she needs to be seen by rheumatology to confirm her diagnosis, I am concerned also that she has findings possibly consistent with scleroderma, or other underlying autoimmune process.  Patient has a history of Crohn's disease with a colostomy    3. Recurrent oral ulcers  History of oral ulcers, states that she is having problems swallowing pills    4. Malar rash  Obvious rash    5. Arthritis  Symmetric arthritis upper and lower extremities        Past social, family, history:   Social History     Tobacco Use   • Smoking status: Never Smoker   • Smokeless tobacco: Never Used   • Tobacco comment: second hand smoke parents - smoked for only 1 year many years ago   Vaping Use   • Vaping Use: Never used   Substance Use Topics   • Alcohol use: Not Currently     Alcohol/week: 0.6 oz     Types: 1 Shots of liquor per week     Comment: gin and half a lime; tonic water   • Drug use: Not Currently     Types: Marijuana, Inhaled     Comment: medical marijuana through bong         MEDICATIONS:    Current Outpatient Medications:   •  naratriptan (AMERGE) 2.5 MG tablet, Take 1 Tablet by mouth as needed for Migraine., Disp: 5 Tablet, Rfl: 3  •  hydrALAZINE (APRESOLINE) 10 MG Tab, Take 1 Tablet by mouth 3 times a day as needed (systolic blood pressure >150.)., Disp: 270 Tablet, Rfl: 3  •  furosemide (LASIX) 20 MG Tab, Take 1 Tablet by mouth 1 time a day as needed (leg swelling/weight gain)., Disp: 90 Tablet, Rfl: 3  •   potassium chloride SA (KDUR) 20 MEQ Tab CR, Take 1 Tablet by mouth 1 time a day as needed (when you take lasix.)., Disp: 90 Tablet, Rfl: 3  •  ondansetron (ZOFRAN ODT) 4 MG TABLET DISPERSIBLE, Take 1 Tablet by mouth every 6 hours as needed for Nausea., Disp: 20 Tablet, Rfl: 0  •  metoprolol tartrate (LOPRESSOR) 50 MG Tab, TAKE 1 TABLET BY MOUTH TWICE DAILY, HOLD IF BLOOD PRESSURE LESS THAN 95/50 (Patient taking differently: Take 50 mg by mouth 2 times a day. TAKE 1 TABLET BY MOUTH TWICE DAILY, HOLD IF BLOOD PRESSURE LESS THAN 95/50), Disp: 180 Tablet, Rfl: 0  •  spironolactone (ALDACTONE) 25 MG Tab, Take 2 Tablets by mouth every day., Disp: 180 Tablet, Rfl: 3  •  prochlorperazine (COMPAZINE) 10 MG Tab, Take 1 Tablet by mouth 1 time a day as needed. (Patient taking differently: Take 10 mg by mouth 1 time a day as needed for Nausea/Vomiting.), Disp: 10 Tablet, Rfl: 0  •  oxyCODONE immediate release (ROXICODONE) 10 MG immediate release tablet, Take 10 mg by mouth every four hours as needed for Severe Pain. Indications: Chronic Pain, Disp: , Rfl:   •  Probiotic Product (PROBIOTIC PO), Take 2 Capsules by mouth every day., Disp: , Rfl:   •  zinc oxide (DESITIN) 40 % Ointment, Apply 5 gm portion to affected area QD (Patient not taking: No sig reported), Disp: 56 g, Rfl: 3  •  diphenhydrAMINE-lidocaine-Maalox (MBX) oral susp cup, Take 5 mL by mouth every 6 hours as needed (orAL pain). 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   • Acute encephalopathy 05/21/2022   • Acute cystitis without hematuria 12/07/2021   • Ureteric stone 12/07/2021   • Migraine 06/04/2019   • FLORES (acute kidney injury) ([facility]) 06/01/2019   • Essential hypertension 05/20/2019   • SIRS  (systemic inflammatory response syndrome) ([facility]) 05/19/2019   • High anion gap metabolic acidosis 05/19/2019   • DVT (deep venous thrombosis) ([facility]) 12/31/2018   • History of MRSA infection 12/29/2018   • History of infection with vancomycin resistant Enterococcus (VRE) 12/29/2018   • 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[facility]) 12/28/2018   • Dysphasia 12/28/2018   • Anemia 12/15/2018   • Thrush of mouth and esophagus ([facility]) 12/13/2018   • Hypokalemia 12/13/2018   • Liver enzyme elevation 12/12/2018   • Leukocytosis 12/12/2018   • Chronic respiratory failure with hypoxia ([facility]) 03/20/2018   • Anxiety disorder due to multiple medical problems 12/01/2017   • DDD (degenerative disc disease), lumbar 04/12/2017   • History of DVT (deep vein thrombosis) 11/23/2016   • Paroxysmal atrial fibrillation ([facility]) 03/26/2015   • Sleep apnea 10/26/2014   • Postherpetic neuralgia 06/24/2014   • Multiple falls 06/24/2014   • COPD (chronic obstructive pulmonary disease) ([facility]) 06/24/2014   • Rosacea 06/24/2014   • Ileostomy in place ([facility]) 06/26/2013   • GERD (gastroesophageal reflux disease) 12/05/2012   • Status post lumbar surgery 07/16/2012   • Crohn's disease of small intestine with complication ([facility]) 09/23/2009   • Chronic pain 09/23/2009   • Opioid type dependence, continuous (CMS-HCC) 09/23/2009       REVIEW OF SYSTEMS:  Gen.:  No Nausea, Vomiting, fever, Chills.  Heart: No chest pain.  Lungs:  No shortness of Breath.  Psychological: Rg unusual Anxiety depression     PHYSICAL EXAM   Constitutional: Alert, cooperative, not in acute distress.  Cardiovascular:  Rate Rhythm is regular without murmurs rubs clicks.     Thorax & Lungs: Clear to auscultation, no wheezing, rhonchi, or rales  HENT: Normocephalic, Atraumatic.  Eyes: PERRLA, EOMI, Conjunctiva normal.   Neck: Trachia is midline no swelling of the thyroid.   Lymphatic: No lymphadenopathy noted.   Abdomin: Soft non-tender, no rebound, no guarding.   Skin: Warm, Dry, No erythema, No rash.   Approximately 1 cm superficial skin breakdown wound at coccyx.  Malar rash  Neurologic: Alert & oriented x 3, cranial nerves II through XII are intact, Normal motor function, Normal sensory function, No focal deficits noted.   Psychiatric: Affect normal, Judgment normal, Mood normal.     VITAL SIGNS:Pulse 100   Temp 36.6 °C (97.9 °F) (Temporal)   Ht 1.829 m (6')   SpO2 94%   BMI 21.10 kg/m²     Labs: Reviewed    Assessment:                                                     Plan:      1. Positive VAHID (antinuclear antibody)  Evaded VAHID at 1-640 history of use of hydralazine leading to Lupus???  History of Crohn's disease.    - Referral to Rheumatology  - Quantiferon Gold TB (PPD); Future  - Referral to Home Health    2. SLE exacerbation (HCC)  Probable SLE waiting confirmation by rheumatology quant Farren gold testing ordered referral written to home health  - Quantiferon Gold TB (PPD); Future  - Referral to Home Health    3. Recurrent oral ulcers  Ongoing  - Quantiferon Gold TB (PPD); Future  - Referral to Home Health    4. Malar rash  On physical examination, patient has a history of photosensitive skin  - Quantiferon Gold TB (PPD); Future  - Referral to Home Health    5. Arthritis  Symmetrical arthritis, states that all her joints are hurting  - Quantiferon Gold TB (PPD); Future  - Referral to Home Health    6. Pressure injury of sacral region, stage 1  Currently not infected

## 2022-06-11 VITALS
RESPIRATION RATE: 18 BRPM | HEIGHT: 72 IN | WEIGHT: 157 LBS | DIASTOLIC BLOOD PRESSURE: 74 MMHG | BODY MASS INDEX: 21.26 KG/M2 | OXYGEN SATURATION: 98 % | TEMPERATURE: 98.5 F | SYSTOLIC BLOOD PRESSURE: 148 MMHG | HEART RATE: 78 BPM

## 2022-06-11 LAB
ALBUMIN SERPL BCP-MCNC: 4.3 G/DL (ref 3.2–4.9)
ALBUMIN/GLOB SERPL: 1.1 G/DL
ALP SERPL-CCNC: 97 U/L (ref 30–99)
ALT SERPL-CCNC: 14 U/L (ref 2–50)
ANION GAP SERPL CALC-SCNC: 12 MMOL/L (ref 7–16)
ANION GAP SERPL CALC-SCNC: 15 MMOL/L (ref 7–16)
AST SERPL-CCNC: 23 U/L (ref 12–45)
BASOPHILS # BLD AUTO: 0.5 % (ref 0–1.8)
BASOPHILS # BLD: 0.05 K/UL (ref 0–0.12)
BILIRUB SERPL-MCNC: 0.3 MG/DL (ref 0.1–1.5)
BUN SERPL-MCNC: 38 MG/DL (ref 8–22)
BUN SERPL-MCNC: 42 MG/DL (ref 8–22)
CALCIUM SERPL-MCNC: 10 MG/DL (ref 8.4–10.2)
CALCIUM SERPL-MCNC: 8.7 MG/DL (ref 8.4–10.2)
CHLORIDE SERPL-SCNC: 102 MMOL/L (ref 96–112)
CHLORIDE SERPL-SCNC: 106 MMOL/L (ref 96–112)
CO2 SERPL-SCNC: 21 MMOL/L (ref 20–33)
CO2 SERPL-SCNC: 22 MMOL/L (ref 20–33)
CREAT SERPL-MCNC: 1.39 MG/DL (ref 0.5–1.4)
CREAT SERPL-MCNC: 1.77 MG/DL (ref 0.5–1.4)
D DIMER PPP IA.FEU-MCNC: 0.49 UG/ML (FEU) (ref 0–0.5)
EOSINOPHIL # BLD AUTO: 0.2 K/UL (ref 0–0.51)
EOSINOPHIL NFR BLD: 2.1 % (ref 0–6.9)
ERYTHROCYTE [DISTWIDTH] IN BLOOD BY AUTOMATED COUNT: 44.7 FL (ref 35.9–50)
GFR SERPLBLD CREATININE-BSD FMLA CKD-EPI: 31 ML/MIN/1.73 M 2
GFR SERPLBLD CREATININE-BSD FMLA CKD-EPI: 41 ML/MIN/1.73 M 2
GLOBULIN SER CALC-MCNC: 3.8 G/DL (ref 1.9–3.5)
GLUCOSE SERPL-MCNC: 106 MG/DL (ref 65–99)
GLUCOSE SERPL-MCNC: 112 MG/DL (ref 65–99)
HCT VFR BLD AUTO: 40.3 % (ref 37–47)
HGB BLD-MCNC: 13.3 G/DL (ref 12–16)
IMM GRANULOCYTES # BLD AUTO: 0.06 K/UL (ref 0–0.11)
IMM GRANULOCYTES NFR BLD AUTO: 0.6 % (ref 0–0.9)
LACTATE BLD-SCNC: 2.1 MMOL/L (ref 0.5–2)
LACTATE BLD-SCNC: 2.6 MMOL/L (ref 0.5–2)
LIPASE SERPL-CCNC: 29 U/L (ref 7–58)
LYMPHOCYTES # BLD AUTO: 2.17 K/UL (ref 1–4.8)
LYMPHOCYTES NFR BLD: 22.6 % (ref 22–41)
MAGNESIUM SERPL-MCNC: 1.8 MG/DL (ref 1.5–2.5)
MCH RBC QN AUTO: 31.3 PG (ref 27–33)
MCHC RBC AUTO-ENTMCNC: 33 G/DL (ref 33.6–35)
MCV RBC AUTO: 94.8 FL (ref 81.4–97.8)
MONOCYTES # BLD AUTO: 1.13 K/UL (ref 0–0.85)
MONOCYTES NFR BLD AUTO: 11.8 % (ref 0–13.4)
NEUTROPHILS # BLD AUTO: 5.98 K/UL (ref 2–7.15)
NEUTROPHILS NFR BLD: 62.4 % (ref 44–72)
NRBC # BLD AUTO: 0 K/UL
NRBC BLD-RTO: 0 /100 WBC
PLATELET # BLD AUTO: 267 K/UL (ref 164–446)
PMV BLD AUTO: 10.7 FL (ref 9–12.9)
POTASSIUM SERPL-SCNC: 3.8 MMOL/L (ref 3.6–5.5)
POTASSIUM SERPL-SCNC: 4.5 MMOL/L (ref 3.6–5.5)
PROT SERPL-MCNC: 8.1 G/DL (ref 6–8.2)
RBC # BLD AUTO: 4.25 M/UL (ref 4.2–5.4)
SODIUM SERPL-SCNC: 139 MMOL/L (ref 135–145)
SODIUM SERPL-SCNC: 139 MMOL/L (ref 135–145)
TROPONIN T SERPL-MCNC: 10 NG/L (ref 6–19)
TROPONIN T SERPL-MCNC: 9 NG/L (ref 6–19)
U1 SNRNP IGG SER QL: 4 UNITS (ref 0–19)
WBC # BLD AUTO: 9.6 K/UL (ref 4.8–10.8)

## 2022-06-11 PROCEDURE — 80048 BASIC METABOLIC PNL TOTAL CA: CPT

## 2022-06-11 PROCEDURE — 96374 THER/PROPH/DIAG INJ IV PUSH: CPT

## 2022-06-11 PROCEDURE — 700102 HCHG RX REV CODE 250 W/ 637 OVERRIDE(OP): Performed by: EMERGENCY MEDICINE

## 2022-06-11 PROCEDURE — 84484 ASSAY OF TROPONIN QUANT: CPT

## 2022-06-11 PROCEDURE — 83605 ASSAY OF LACTIC ACID: CPT | Mod: 91

## 2022-06-11 PROCEDURE — 83690 ASSAY OF LIPASE: CPT

## 2022-06-11 PROCEDURE — 36415 COLL VENOUS BLD VENIPUNCTURE: CPT

## 2022-06-11 PROCEDURE — 94760 N-INVAS EAR/PLS OXIMETRY 1: CPT

## 2022-06-11 PROCEDURE — 700105 HCHG RX REV CODE 258: Performed by: EMERGENCY MEDICINE

## 2022-06-11 PROCEDURE — 85379 FIBRIN DEGRADATION QUANT: CPT

## 2022-06-11 PROCEDURE — 83735 ASSAY OF MAGNESIUM: CPT

## 2022-06-11 PROCEDURE — 96376 TX/PRO/DX INJ SAME DRUG ADON: CPT

## 2022-06-11 PROCEDURE — 85025 COMPLETE CBC W/AUTO DIFF WBC: CPT

## 2022-06-11 PROCEDURE — 80053 COMPREHEN METABOLIC PANEL: CPT

## 2022-06-11 PROCEDURE — 96375 TX/PRO/DX INJ NEW DRUG ADDON: CPT

## 2022-06-11 PROCEDURE — 700111 HCHG RX REV CODE 636 W/ 250 OVERRIDE (IP): Performed by: EMERGENCY MEDICINE

## 2022-06-11 PROCEDURE — 96361 HYDRATE IV INFUSION ADD-ON: CPT

## 2022-06-11 PROCEDURE — A9270 NON-COVERED ITEM OR SERVICE: HCPCS | Performed by: EMERGENCY MEDICINE

## 2022-06-11 PROCEDURE — 93005 ELECTROCARDIOGRAM TRACING: CPT | Performed by: EMERGENCY MEDICINE

## 2022-06-11 RX ORDER — LORAZEPAM 2 MG/ML
1 INJECTION INTRAMUSCULAR ONCE
Status: DISCONTINUED | OUTPATIENT
Start: 2022-06-11 | End: 2022-06-11 | Stop reason: HOSPADM

## 2022-06-11 RX ORDER — SODIUM CHLORIDE 9 MG/ML
1000 INJECTION, SOLUTION INTRAVENOUS ONCE
Status: COMPLETED | OUTPATIENT
Start: 2022-06-11 | End: 2022-06-11

## 2022-06-11 RX ORDER — ONDANSETRON 2 MG/ML
4 INJECTION INTRAMUSCULAR; INTRAVENOUS ONCE
Status: COMPLETED | OUTPATIENT
Start: 2022-06-11 | End: 2022-06-11

## 2022-06-11 RX ORDER — SUMATRIPTAN 25 MG/1
25 TABLET, FILM COATED ORAL ONCE
Status: COMPLETED | OUTPATIENT
Start: 2022-06-11 | End: 2022-06-11

## 2022-06-11 RX ORDER — DIAZEPAM 5 MG/1
5 TABLET ORAL ONCE
Status: COMPLETED | OUTPATIENT
Start: 2022-06-11 | End: 2022-06-11

## 2022-06-11 RX ORDER — HYDROMORPHONE HYDROCHLORIDE 1 MG/ML
0.5 INJECTION, SOLUTION INTRAMUSCULAR; INTRAVENOUS; SUBCUTANEOUS ONCE
Status: COMPLETED | OUTPATIENT
Start: 2022-06-11 | End: 2022-06-11

## 2022-06-11 RX ADMIN — HYDROMORPHONE HYDROCHLORIDE 0.5 MG: 1 INJECTION, SOLUTION INTRAMUSCULAR; INTRAVENOUS; SUBCUTANEOUS at 01:58

## 2022-06-11 RX ADMIN — SUMATRIPTAN SUCCINATE 25 MG: 25 TABLET ORAL at 01:58

## 2022-06-11 RX ADMIN — ONDANSETRON 4 MG: 2 INJECTION INTRAMUSCULAR; INTRAVENOUS at 03:44

## 2022-06-11 RX ADMIN — ONDANSETRON 4 MG: 2 INJECTION INTRAMUSCULAR; INTRAVENOUS at 01:30

## 2022-06-11 RX ADMIN — SODIUM CHLORIDE 1000 ML: 9 INJECTION, SOLUTION INTRAVENOUS at 01:00

## 2022-06-11 RX ADMIN — DIAZEPAM 5 MG: 5 TABLET ORAL at 04:07

## 2022-06-11 ASSESSMENT — PAIN DESCRIPTION - DESCRIPTORS: DESCRIPTORS: STABBING

## 2022-06-11 ASSESSMENT — FIBROSIS 4 INDEX: FIB4 SCORE: 1.62

## 2022-06-11 NOTE — ED PROVIDER NOTES
ED Provider Note    ED Provider Note    Scribed for Arnie Hillman MD by Arnie Hillman M.D.. 6/11/2022, 12:19 AM.    Primary care provider: Chase Charles D.O.  Means of arrival: Private  History obtained from: Patient  History limited by: None    CHIEF COMPLAINT  Chief Complaint   Patient presents with   • N/V   • Chest Pain     Right sided and SS chest pain worse this evening and pt reporting to ED as told to do by her PCP. Also states her PCP saw her a week ago after she began having difficulty eating and drinking and told her to give it one more week and if no improvement to report to ED for hydration. Urine and Ostomy output decreased as a result.          CAN Overton is a 66 y.o. female who presents to the Emergency Department for evaluation of discomfort to the chest and poor oral intake.  Discomfort is localized to the center of the chest, she endorses she has had this for least a week; worse more so this evening.  No exertional component, no clear alleviating factors, patient notes in addition she has had difficulty eating and drinking for over a week.  She is in fact been seen at this facility and found to have decline in her renal function and was in fact admitted for this last month.  She saw her primary care provider about a week ago and again today noting again persistent difficulty with oral intake including fluids.  She was instructed to come to the emergency department as she would likely require IV fluid resuscitation.  Patient notes sensation of discomfort to the that is localized to the  lower extremities as well.  She also developed a bedsore unfortunately during her hospitalization, sacral area on the right.  Primary care did evaluate that today.  She also notes headache similar to her previous migraines, started on a triptan medication for that today.    REVIEW OF SYSTEMS  Pertinent positives include chest discomfort, discomfort to the extremities, poor oral  intake, migraine headache, sacral decubitus ulcer. Pertinent negatives include no acute fever, no vomiting.  All other systems reviewed and negative.    PAST MEDICAL HISTORY   has a past medical history of Anesthesia, Anxiety, Arthritis, ASTHMA, Atrial fib/flut, Backpain, Bronchitis, Chronic pain, Colostomy in place ([facility]) (10/14/2010), COPD (chronic obstructive pulmonary disease) ([facility]) (6/24/2014), COPD (chronic obstructive pulmonary disease) ([facility]) (6/24/2014), Crohn's disease of colon ([facility]), Dyspnea, History of cardiac murmur, Hypokalemia (6/24/2014), Ileostomy present ([facility]), Infectious disease, Multiple falls (6/24/2014), Narcotic dependence ([facility]) (9/23/2009), Obstruction, Other specified disorder of intestines, Pain (7/11/13), Pneumonia, Postherpetic neuralgia (6/24/2014), Rosacea (6/24/2014), and Sleep apnea.    SURGICAL HISTORY   has a past surgical history that includes lumbar fusion anterior; cervical disk and fusion anterior; foot surgery; hand surgery; other abdominal surgery; gastroscopy with biopsy (11/22/08); ileostomy (11/11/2009); dilation and curettage (9/24/2010); gastroscopy (10/4/2011); colonoscopy (10/4/2011); hardware removal neuro (7/16/2012); mammoplasty reduction (7/17/2013); ileostomy (5/14/2014); breast reduction; abdominal exploration; lumpectomy; colon resection; ileostomy (12/29/2018); sigmoidoscopy flex (12/29/2018); exploratory laparotomy (12/29/2018); gastroscopy (1/6/2019); gastroscopy (N/A, 5/22/2019); ileostomy (1/20/2021); lysis adhesions general (1/20/2021); cysto/uretero/pyeloscopy, dx (Right, 12/8/2021); cysto stent placemnt pre surg (Right, 12/8/2021); cystoscopy,insert ureteral stent (Right, 12/23/2021); and cysto/uretero/pyeloscopy, dx (Right, 12/23/2021).    SOCIAL HISTORY  Social History     Tobacco Use   • Smoking status: Never Smoker   • Smokeless tobacco: Never Used   • Tobacco comment: second hand smoke parents - smoked for only 1 year many years ago   Vaping Use   •  "Vaping Use: Never used   Substance Use Topics   • Alcohol use: Not Currently     Alcohol/week: 0.6 oz     Types: 1 Shots of liquor per week     Comment: gin and half a lime; tonic water   • Drug use: Not Currently     Types: Marijuana, Inhaled     Comment: medical marijuana through bong      Social History     Substance and Sexual Activity   Drug Use Not Currently   • Types: Marijuana, Inhaled    Comment: medical marijuana through bong       FAMILY HISTORY  Family History   Problem Relation Age of Onset   • Lung Disease Mother         copd   • Diabetes Father    • Heart Disease Father    • Diabetes Sister    • Cancer Maternal Aunt 40        breast       CURRENT MEDICATIONS  Home Medications    **Home medications have not yet been reviewed for this encounter**         ALLERGIES  Allergies   Allergen Reactions   • Azathioprine Sodium Hives and Shortness of Breath   • Infliximab Hives, Shortness of Breath and Rash     .   • Methotrexate Hives, Shortness of Breath and Rash     .   • Methotrexate Hives, Rash and Shortness of Breath     .   • Morphine Shortness of Breath, Swelling and Palpitations   • Promethazine Shortness of Breath and Rash     .   • Roxanol [Morphine Sulfate] Swelling     Throat swells   • Amitriptyline Unspecified     Nightmares     • Carafate [Sucralfate] Vomiting and Nausea     Generalizes/Mouth Sores   • Demerol Vomiting   • Fentanyl Unspecified     Patches caused skin breakdown, no pain relief   • Fentanyl      \"Patches didn't work\"  Skin broke down   • Lorazepam Unspecified     States give me nightmares.    • Meperidine Vomiting   • Methadone Unspecified     Didn't work   • Other Drug Unspecified     steroids   • Pregabalin Rash     Rash     • Pseudoephedrine Vomiting   • Sulfa Drugs Hives and Rash     .  .   • Ketorolac Tromethamine    • Betamethasone Unspecified     Pt unable to remember   • Celestone [Betamethasone Sodium Phosphate] Unspecified     Pt unable to remember   • Sulfasalazine Rash "     On chest       PHYSICAL EXAM  VITAL SIGNS: BP (!) 140/62   Pulse 89   Temp 35.9 °C (96.6 °F) (Tympanic)   Resp 18   Ht 1.829 m (6')   Wt 71.2 kg (157 lb)   SpO2 98%   BMI 21.29 kg/m²     General: Alert, no acute distress  Skin: Warm, dry, mildly pale, otherwise normal for ethnicity  Head: Normocephalic, atraumatic  Neck: Trachea midline, no tenderness  Eye: PERRL, normal conjunctiva, extraocular movements intact  ENMT: Oral mucosa pink and dry, no pharyngeal erythema or exudate  Cardiovascular: Regular rate and rhythm, No murmur, Normal peripheral perfusion  Respiratory: Lungs CTA, respirations are non-labored, breath sounds are equal  Gastrointestinal: Colostomy in place.  Sacral decubitus ulcer stage I noted localized to the right of the sacral region.  Musculoskeletal: No swelling, no deformity  Neurological: Alert and oriented to person, place, time, and situation  Lymphatics: No lymphadenopathy  Psychiatric: Cooperative, mildly anxious, otherwise appropriate mood & affect      DIAGNOSTIC STUDIES/PROCEDURES    LABS  Results for orders placed or performed during the hospital encounter of 06/10/22   CBC w/ Differential   Result Value Ref Range    WBC 9.6 4.8 - 10.8 K/uL    RBC 4.25 4.20 - 5.40 M/uL    Hemoglobin 13.3 12.0 - 16.0 g/dL    Hematocrit 40.3 37.0 - 47.0 %    MCV 94.8 81.4 - 97.8 fL    MCH 31.3 27.0 - 33.0 pg    MCHC 33.0 (L) 33.6 - 35.0 g/dL    RDW 44.7 35.9 - 50.0 fL    Platelet Count 267 164 - 446 K/uL    MPV 10.7 9.0 - 12.9 fL    Neutrophils-Polys 62.40 44.00 - 72.00 %    Lymphocytes 22.60 22.00 - 41.00 %    Monocytes 11.80 0.00 - 13.40 %    Eosinophils 2.10 0.00 - 6.90 %    Basophils 0.50 0.00 - 1.80 %    Immature Granulocytes 0.60 0.00 - 0.90 %    Nucleated RBC 0.00 /100 WBC    Neutrophils (Absolute) 5.98 2.00 - 7.15 K/uL    Lymphs (Absolute) 2.17 1.00 - 4.80 K/uL    Monos (Absolute) 1.13 (H) 0.00 - 0.85 K/uL    Eos (Absolute) 0.20 0.00 - 0.51 K/uL    Baso (Absolute) 0.05 0.00 - 0.12  K/uL    Immature Granulocytes (abs) 0.06 0.00 - 0.11 K/uL    NRBC (Absolute) 0.00 K/uL   Complete Metabolic Panel (CMP)   Result Value Ref Range    Sodium 139 135 - 145 mmol/L    Potassium 3.8 3.6 - 5.5 mmol/L    Chloride 102 96 - 112 mmol/L    Co2 22 20 - 33 mmol/L    Anion Gap 15.0 7.0 - 16.0    Glucose 106 (H) 65 - 99 mg/dL    Bun 42 (H) 8 - 22 mg/dL    Creatinine 1.77 (H) 0.50 - 1.40 mg/dL    Calcium 10.0 8.4 - 10.2 mg/dL    AST(SGOT) 23 12 - 45 U/L    ALT(SGPT) 14 2 - 50 U/L    Alkaline Phosphatase 97 30 - 99 U/L    Total Bilirubin 0.3 0.1 - 1.5 mg/dL    Albumin 4.3 3.2 - 4.9 g/dL    Total Protein 8.1 6.0 - 8.2 g/dL    Globulin 3.8 (H) 1.9 - 3.5 g/dL    A-G Ratio 1.1 g/dL   Troponin STAT   Result Value Ref Range    Troponin T 9 6 - 19 ng/L   Lipase   Result Value Ref Range    Lipase 29 7 - 58 U/L   Magnesium   Result Value Ref Range    Magnesium 1.8 1.5 - 2.5 mg/dL   LACTIC ACID   Result Value Ref Range    Lactic Acid 2.1 (H) 0.5 - 2.0 mmol/L   D-DIMER   Result Value Ref Range    D-Dimer Screen 0.49 0.00 - 0.50 ug/mL (FEU)   ESTIMATED GFR   Result Value Ref Range    GFR (CKD-EPI) 31 (A) >60 mL/min/1.73 m 2   BMP   Result Value Ref Range    Sodium 139 135 - 145 mmol/L    Potassium 4.5 3.6 - 5.5 mmol/L    Chloride 106 96 - 112 mmol/L    Co2 21 20 - 33 mmol/L    Glucose 112 (H) 65 - 99 mg/dL    Bun 38 (H) 8 - 22 mg/dL    Creatinine 1.39 0.50 - 1.40 mg/dL    Calcium 8.7 8.4 - 10.2 mg/dL    Anion Gap 12.0 7.0 - 16.0   TROPONIN   Result Value Ref Range    Troponin T 10 6 - 19 ng/L   LACTIC ACID   Result Value Ref Range    Lactic Acid 2.6 (H) 0.5 - 2.0 mmol/L   ESTIMATED GFR   Result Value Ref Range    GFR (CKD-EPI) 41 (A) >60 mL/min/1.73 m 2     *Note: Due to a large number of results and/or encounters for the requested time period, some results have not been displayed. A complete set of results can be found in Results Review.     All labs reviewed by me, GFR is slightly decreased from previous baseline,  BUN/creatinine ratio is consistent with volume depletion.    EKG  12 Lead EKG obtained at 0015 and interpreted by me to show:  Rhythm: Normal sinus rhythm   Rate: 84  Axis: Normal  Intervals: Normal  Q Waves: Normal  No diagnostic ST segment elevation    Clinical Impression: Normal EKG  Compared to none immediately available        COURSE & MEDICAL DECISION MAKING  Pertinent Labs & Imaging studies reviewed. (See chart for details)    12:19 AM - Patient seen and examined at bedside. Patient will be treated with 1 L of crystalloid. Ordered cardiac work-up to evaluate her symptoms. The differential diagnoses include but are not limited to: Gastroparesis, gastritis, dehydration, electrolyte abnormality, acute on chronic kidney injury    0134: Patient reassessed, I updated her with unremarkable D-dimer.  She requests something for pain which I think is reasonable as she is obviously acutely uncomfortable.  Given her allergy profile I have ordered hydromorphone 0.5 mg IV, dose attenuated for her age.  She is continue her IV fluids, she had about 500 cc at this time.  She request something for migraine noting she has recently been started on a triptan medication.  Sumatriptan is on her formulary so I have ordered a dose of this as well, 25 mg p.o.    0317: Awaiting repeat troponin at this time.  We will redraw patient's chemistry panel and lactic acid after IV fluids completed.    0336: No active vomiting patient is again feeling nauseous.  I will order additional Zofran given there is no long QT noted on her EKG.  4 mg IV initiated at this time.    0440: Patient reassessed, updated with work-up results.  Creatinine is actually the normal range at this time after IV fluids.  GFR is much improved compared to previous.  No indication for inpatient management.  Repeat troponin also unremarkable, delta troponin is 0.    Patient Vitals for the past 24 hrs:   BP Temp Temp src Pulse Resp SpO2 Height Weight   06/11/22 0347 (!)  153/75 -- -- -- -- -- -- --   06/11/22 0202 (!) 140/62 -- -- 89 18 98 % -- --   06/11/22 0046 129/75 -- -- 92 18 98 % -- --   06/11/22 0017 -- -- -- -- -- -- -- 71.2 kg (157 lb)   06/10/22 2352 (!) 141/86 35.9 °C (96.6 °F) Tympanic 97 20 95 % 1.829 m (6') 71.5 kg (157 lb 10.1 oz)         HYDRATION: Based on the patient's presentation of Acute Kindney Injury and Dehydration the patient was given IV fluids. IV Hydration was used because oral hydration was not adequate alone. Upon recheck following hydration, the patient was Doing better, creatinine normalized after fluid bolus.    Decision Making:  This is a 66 y.o. year old female who presents with multiple concerns including chest pain more so this evening and concern for dehydration.  She has history of multiple autoimmune diseases, she is also status post colectomy and colostomy.  Patient notes poor oral intake with regard to both food and fluids with persistent nausea of unclear etiology.  She is pale and has dry oral mucous membranes, clear indication for IV fluids.  GFR is only slightly declined from the previous baseline.  BUN/creatinine ratio is consistent with volume depletion, will recheck her chemistries after fluid bolus complete.  Troponin and EKG are reassuring, heart score is 3, low risk ratification, doubt ACS.    The patient will return for new or worsening symptoms and is stable at the time of discharge.    Patient has had high blood pressure while in the emergency department, felt likely secondary to medical condition. Counseled patient to monitor blood pressure at home and follow up with primary care physician.     DISPOSITION:  Patient will be discharged home in stable condition.    FOLLOW UP:  Chase Charles D.O.  [address] [id number] [phone]    Schedule an appointment as soon as possible for a visit         OUTPATIENT MEDICATIONS:  New Prescriptions    No medications on file         FINAL IMPRESSION  1.  Dehydration    2. Chest pain, unspecified type    3. Migraine without status migrainosus, not intractable, unspecified migraine type    4. Decubitus ulcer of sacral region, stage 1    5. Leg cramps          Arnie DE LA CRUZ M.D. (Scribe), am scribing for, and in the presence of, Arnie Hillman MD.    Electronically signed by: Arnie Hillman M.D. (Scribe), 6/11/2022    IArnie MD personally performed the services described in this documentation, as scribed by Arnie Hillman M.D. in my presence, and it is both accurate and complete    The note accurately reflects work and decisions made by me.  Arnie Hillman M.D.  6/11/2022  4:55 AM

## 2022-06-11 NOTE — ED NOTES
Pt requested Zofran for Nausea, fluids, and the migraine medication she received last visit. Requested and ordered by Dr Arango. Pt refused Ativan stating she began having nightmares at home and believes the ativan may have caused it. Dr Hillman notified. Awaiting orders.

## 2022-06-11 NOTE — ED NOTES
Pt to ED with complete hx as complaints this evening. Pt provides complete medical hx and states initially that that is what she is here for. After review of her complaints I was able to determine that she is here this evening for evaluation of Chest pain that she has been having and recently was evaluated by PCP and told she has kidney failure related CP. Right sided and SS chest pain worse this evening and pt reporting to ED as told to do by her PCP. Also states her PCP saw her a week ago after she began having difficulty eating and drinking and told her to give it one more week and if no improvement to report to ED for hydration. Urine and Ostomy output decreased as a result.

## 2022-06-11 NOTE — ED NOTES
Medications given as ordered. No difficulty swallowing pill. Nausea remains gone. Lights dimmed for comfort.

## 2022-06-11 NOTE — ED NOTES
DC instructions reviewed. Verbalizes understanding and denies further need at this time. States she is feeling a lot better since arrival to ED. Ambulated to exit with a steady cane assisted gait to wait for  to pick her up.

## 2022-06-11 NOTE — ED TRIAGE NOTES
Chief Complaint   Patient presents with   • N/V   • Chest Pain     Right sided and SS chest pain worse this evening and pt reporting to ED as told to do by her PCP. Also states her PCP saw her a week ago after she began having difficulty eating and drinking and told her to give it one more week and if no improvement to report to ED for hydration. Urine and Ostomy output decreased as a result.        BP (!) 141/86   Pulse 97   Temp 35.9 °C (96.6 °F) (Tympanic)   Resp 20   Ht 1.829 m (6')   Wt 71.2 kg (157 lb)   SpO2 95%   BMI 21.29 kg/m²

## 2022-06-12 LAB — DSDNA AB TITR SER CLIF: 18 IU (ref 0–24)

## 2022-06-14 ENCOUNTER — HOSPITAL ENCOUNTER (OUTPATIENT)
Dept: LAB | Facility: MEDICAL CENTER | Age: 67
End: 2022-06-14
Attending: INTERNAL MEDICINE
Payer: MEDICARE

## 2022-06-14 DIAGNOSIS — M32.9 SLE EXACERBATION (HCC): ICD-10-CM

## 2022-06-14 DIAGNOSIS — K13.79 RECURRENT ORAL ULCERS: ICD-10-CM

## 2022-06-14 DIAGNOSIS — I10 ESSENTIAL HYPERTENSION: ICD-10-CM

## 2022-06-14 DIAGNOSIS — J96.11 CHRONIC RESPIRATORY FAILURE WITH HYPOXIA (HCC): ICD-10-CM

## 2022-06-14 DIAGNOSIS — I50.32 CHRONIC DIASTOLIC CONGESTIVE HEART FAILURE (HCC): ICD-10-CM

## 2022-06-14 DIAGNOSIS — R76.8 POSITIVE ANA (ANTINUCLEAR ANTIBODY): ICD-10-CM

## 2022-06-14 DIAGNOSIS — R21 MALAR RASH: ICD-10-CM

## 2022-06-14 DIAGNOSIS — M19.90 ARTHRITIS: ICD-10-CM

## 2022-06-14 DIAGNOSIS — I48.0 PAROXYSMAL ATRIAL FIBRILLATION (HCC): ICD-10-CM

## 2022-06-14 LAB
ANION GAP SERPL CALC-SCNC: 14 MMOL/L (ref 7–16)
BUN SERPL-MCNC: 27 MG/DL (ref 8–22)
CALCIUM SERPL-MCNC: 9.8 MG/DL (ref 8.5–10.5)
CHLORIDE SERPL-SCNC: 105 MMOL/L (ref 96–112)
CO2 SERPL-SCNC: 19 MMOL/L (ref 20–33)
CREAT SERPL-MCNC: 1.04 MG/DL (ref 0.5–1.4)
ERYTHROCYTE [DISTWIDTH] IN BLOOD BY AUTOMATED COUNT: 45.2 FL (ref 35.9–50)
GFR SERPLBLD CREATININE-BSD FMLA CKD-EPI: 58 ML/MIN/1.73 M 2
GLUCOSE SERPL-MCNC: 89 MG/DL (ref 65–99)
HCT VFR BLD AUTO: 40.4 % (ref 37–47)
HGB BLD-MCNC: 13.2 G/DL (ref 12–16)
MAGNESIUM SERPL-MCNC: 1.8 MG/DL (ref 1.5–2.5)
MCH RBC QN AUTO: 30.9 PG (ref 27–33)
MCHC RBC AUTO-ENTMCNC: 32.7 G/DL (ref 33.6–35)
MCV RBC AUTO: 94.6 FL (ref 81.4–97.8)
PHOSPHATE SERPL-MCNC: 2.4 MG/DL (ref 2.5–4.5)
PLATELET # BLD AUTO: 274 K/UL (ref 164–446)
PMV BLD AUTO: 11.3 FL (ref 9–12.9)
POTASSIUM SERPL-SCNC: 3.6 MMOL/L (ref 3.6–5.5)
RBC # BLD AUTO: 4.27 M/UL (ref 4.2–5.4)
SODIUM SERPL-SCNC: 138 MMOL/L (ref 135–145)
WBC # BLD AUTO: 8.6 K/UL (ref 4.8–10.8)

## 2022-06-14 PROCEDURE — 86480 TB TEST CELL IMMUN MEASURE: CPT

## 2022-06-14 PROCEDURE — 80048 BASIC METABOLIC PNL TOTAL CA: CPT

## 2022-06-14 PROCEDURE — 85027 COMPLETE CBC AUTOMATED: CPT

## 2022-06-14 PROCEDURE — 84100 ASSAY OF PHOSPHORUS: CPT

## 2022-06-14 PROCEDURE — 83735 ASSAY OF MAGNESIUM: CPT

## 2022-06-14 PROCEDURE — 36415 COLL VENOUS BLD VENIPUNCTURE: CPT

## 2022-06-15 ENCOUNTER — TELEPHONE (OUTPATIENT)
Dept: CARDIOLOGY | Facility: MEDICAL CENTER | Age: 67
End: 2022-06-15
Payer: MEDICARE

## 2022-06-15 LAB
GAMMA INTERFERON BACKGROUND BLD IA-ACNC: 0.02 IU/ML
M TB IFN-G BLD-IMP: NEGATIVE
M TB IFN-G CD4+ BCKGRND COR BLD-ACNC: 0 IU/ML
MITOGEN IGNF BCKGRD COR BLD-ACNC: >10 IU/ML
QFT TB2 - NIL TBQ2: 0 IU/ML

## 2022-06-15 RX ORDER — NARATRIPTAN 2.5 MG/1
2.5 TABLET ORAL PRN
Qty: 5 TABLET | Refills: 3 | Status: SHIPPED | OUTPATIENT
Start: 2022-06-15

## 2022-06-16 ENCOUNTER — HOSPITAL ENCOUNTER (EMERGENCY)
Facility: MEDICAL CENTER | Age: 67
End: 2022-06-16
Attending: STUDENT IN AN ORGANIZED HEALTH CARE EDUCATION/TRAINING PROGRAM
Payer: MEDICARE

## 2022-06-16 ENCOUNTER — PATIENT MESSAGE (OUTPATIENT)
Dept: CARDIOLOGY | Facility: MEDICAL CENTER | Age: 67
End: 2022-06-16

## 2022-06-16 ENCOUNTER — TELEPHONE (OUTPATIENT)
Dept: CARDIOLOGY | Facility: MEDICAL CENTER | Age: 67
End: 2022-06-16
Payer: MEDICARE

## 2022-06-16 VITALS
SYSTOLIC BLOOD PRESSURE: 138 MMHG | WEIGHT: 156.97 LBS | HEIGHT: 72 IN | DIASTOLIC BLOOD PRESSURE: 80 MMHG | TEMPERATURE: 98.3 F | OXYGEN SATURATION: 96 % | BODY MASS INDEX: 21.26 KG/M2 | RESPIRATION RATE: 18 BRPM | HEART RATE: 94 BPM

## 2022-06-16 DIAGNOSIS — E86.0 DEHYDRATION: ICD-10-CM

## 2022-06-16 DIAGNOSIS — R11.2 NON-INTRACTABLE VOMITING WITH NAUSEA, UNSPECIFIED VOMITING TYPE: ICD-10-CM

## 2022-06-16 DIAGNOSIS — G89.29 OTHER CHRONIC PAIN: ICD-10-CM

## 2022-06-16 LAB
ALBUMIN SERPL BCP-MCNC: 4.3 G/DL (ref 3.2–4.9)
ALBUMIN/GLOB SERPL: 1.3 G/DL
ALP SERPL-CCNC: 93 U/L (ref 30–99)
ALT SERPL-CCNC: 23 U/L (ref 2–50)
ANION GAP SERPL CALC-SCNC: 12 MMOL/L (ref 7–16)
APPEARANCE UR: CLEAR
AST SERPL-CCNC: 27 U/L (ref 12–45)
BACTERIA #/AREA URNS HPF: ABNORMAL /HPF
BASOPHILS # BLD AUTO: 0.8 % (ref 0–1.8)
BASOPHILS # BLD: 0.07 K/UL (ref 0–0.12)
BILIRUB SERPL-MCNC: 0.5 MG/DL (ref 0.1–1.5)
BILIRUB UR QL STRIP.AUTO: NEGATIVE
BUN SERPL-MCNC: 27 MG/DL (ref 8–22)
CALCIUM SERPL-MCNC: 9.6 MG/DL (ref 8.4–10.2)
CHLORIDE SERPL-SCNC: 104 MMOL/L (ref 96–112)
CO2 SERPL-SCNC: 22 MMOL/L (ref 20–33)
COLOR UR: YELLOW
CREAT SERPL-MCNC: 1.42 MG/DL (ref 0.5–1.4)
EKG IMPRESSION: NORMAL
EOSINOPHIL # BLD AUTO: 0.15 K/UL (ref 0–0.51)
EOSINOPHIL NFR BLD: 1.7 % (ref 0–6.9)
EPI CELLS #/AREA URNS HPF: ABNORMAL /HPF
ERYTHROCYTE [DISTWIDTH] IN BLOOD BY AUTOMATED COUNT: 45.3 FL (ref 35.9–50)
GFR SERPLBLD CREATININE-BSD FMLA CKD-EPI: 40 ML/MIN/1.73 M 2
GLOBULIN SER CALC-MCNC: 3.3 G/DL (ref 1.9–3.5)
GLUCOSE SERPL-MCNC: 89 MG/DL (ref 65–99)
GLUCOSE UR STRIP.AUTO-MCNC: NEGATIVE MG/DL
HCT VFR BLD AUTO: 38.2 % (ref 37–47)
HGB BLD-MCNC: 12.5 G/DL (ref 12–16)
IMM GRANULOCYTES # BLD AUTO: 0.04 K/UL (ref 0–0.11)
IMM GRANULOCYTES NFR BLD AUTO: 0.4 % (ref 0–0.9)
KETONES UR STRIP.AUTO-MCNC: NEGATIVE MG/DL
LACTATE BLD-SCNC: 1.7 MMOL/L (ref 0.5–2)
LEUKOCYTE ESTERASE UR QL STRIP.AUTO: ABNORMAL
LIPASE SERPL-CCNC: 18 U/L (ref 7–58)
LYMPHOCYTES # BLD AUTO: 2.12 K/UL (ref 1–4.8)
LYMPHOCYTES NFR BLD: 23.8 % (ref 22–41)
MAGNESIUM SERPL-MCNC: 1.7 MG/DL (ref 1.5–2.5)
MCH RBC QN AUTO: 31 PG (ref 27–33)
MCHC RBC AUTO-ENTMCNC: 32.7 G/DL (ref 33.6–35)
MCV RBC AUTO: 94.8 FL (ref 81.4–97.8)
MICRO URNS: ABNORMAL
MONOCYTES # BLD AUTO: 0.59 K/UL (ref 0–0.85)
MONOCYTES NFR BLD AUTO: 6.6 % (ref 0–13.4)
NEUTROPHILS # BLD AUTO: 5.94 K/UL (ref 2–7.15)
NEUTROPHILS NFR BLD: 66.7 % (ref 44–72)
NITRITE UR QL STRIP.AUTO: NEGATIVE
NRBC # BLD AUTO: 0 K/UL
NRBC BLD-RTO: 0 /100 WBC
PH UR STRIP.AUTO: 5.5 [PH] (ref 5–8)
PLATELET # BLD AUTO: 244 K/UL (ref 164–446)
PMV BLD AUTO: 10 FL (ref 9–12.9)
POTASSIUM SERPL-SCNC: 3.5 MMOL/L (ref 3.6–5.5)
PROT SERPL-MCNC: 7.6 G/DL (ref 6–8.2)
PROT UR QL STRIP: NEGATIVE MG/DL
RBC # BLD AUTO: 4.03 M/UL (ref 4.2–5.4)
RBC # URNS HPF: ABNORMAL /HPF
RBC UR QL AUTO: NEGATIVE
SODIUM SERPL-SCNC: 138 MMOL/L (ref 135–145)
SP GR UR STRIP.AUTO: >=1.03
TROPONIN T SERPL-MCNC: 13 NG/L (ref 6–19)
WBC # BLD AUTO: 8.9 K/UL (ref 4.8–10.8)
WBC #/AREA URNS HPF: ABNORMAL /HPF

## 2022-06-16 PROCEDURE — 700105 HCHG RX REV CODE 258: Performed by: STUDENT IN AN ORGANIZED HEALTH CARE EDUCATION/TRAINING PROGRAM

## 2022-06-16 PROCEDURE — 83735 ASSAY OF MAGNESIUM: CPT

## 2022-06-16 PROCEDURE — 36415 COLL VENOUS BLD VENIPUNCTURE: CPT

## 2022-06-16 PROCEDURE — 87040 BLOOD CULTURE FOR BACTERIA: CPT

## 2022-06-16 PROCEDURE — 700102 HCHG RX REV CODE 250 W/ 637 OVERRIDE(OP): Performed by: STUDENT IN AN ORGANIZED HEALTH CARE EDUCATION/TRAINING PROGRAM

## 2022-06-16 PROCEDURE — 93005 ELECTROCARDIOGRAM TRACING: CPT | Performed by: STUDENT IN AN ORGANIZED HEALTH CARE EDUCATION/TRAINING PROGRAM

## 2022-06-16 PROCEDURE — 85025 COMPLETE CBC W/AUTO DIFF WBC: CPT

## 2022-06-16 PROCEDURE — 99284 EMERGENCY DEPT VISIT MOD MDM: CPT

## 2022-06-16 PROCEDURE — 96374 THER/PROPH/DIAG INJ IV PUSH: CPT

## 2022-06-16 PROCEDURE — 96375 TX/PRO/DX INJ NEW DRUG ADDON: CPT

## 2022-06-16 PROCEDURE — 81001 URINALYSIS AUTO W/SCOPE: CPT

## 2022-06-16 PROCEDURE — 84484 ASSAY OF TROPONIN QUANT: CPT

## 2022-06-16 PROCEDURE — 80053 COMPREHEN METABOLIC PANEL: CPT

## 2022-06-16 PROCEDURE — A9270 NON-COVERED ITEM OR SERVICE: HCPCS | Performed by: STUDENT IN AN ORGANIZED HEALTH CARE EDUCATION/TRAINING PROGRAM

## 2022-06-16 PROCEDURE — 700111 HCHG RX REV CODE 636 W/ 250 OVERRIDE (IP): Performed by: STUDENT IN AN ORGANIZED HEALTH CARE EDUCATION/TRAINING PROGRAM

## 2022-06-16 PROCEDURE — 83690 ASSAY OF LIPASE: CPT

## 2022-06-16 PROCEDURE — 83605 ASSAY OF LACTIC ACID: CPT

## 2022-06-16 RX ORDER — ACETAMINOPHEN 325 MG/1
650 TABLET ORAL ONCE
Status: COMPLETED | OUTPATIENT
Start: 2022-06-16 | End: 2022-06-16

## 2022-06-16 RX ORDER — ONDANSETRON 2 MG/ML
4 INJECTION INTRAMUSCULAR; INTRAVENOUS ONCE
Status: COMPLETED | OUTPATIENT
Start: 2022-06-16 | End: 2022-06-16

## 2022-06-16 RX ORDER — SODIUM CHLORIDE 9 MG/ML
500 INJECTION, SOLUTION INTRAVENOUS ONCE
Status: COMPLETED | OUTPATIENT
Start: 2022-06-16 | End: 2022-06-16

## 2022-06-16 RX ORDER — OXYCODONE HYDROCHLORIDE 10 MG/1
10 TABLET ORAL ONCE
Status: COMPLETED | OUTPATIENT
Start: 2022-06-16 | End: 2022-06-16

## 2022-06-16 RX ORDER — METOPROLOL TARTRATE 50 MG/1
50 TABLET, FILM COATED ORAL ONCE
Status: COMPLETED | OUTPATIENT
Start: 2022-06-16 | End: 2022-06-16

## 2022-06-16 RX ORDER — SUMATRIPTAN 25 MG/1
50 TABLET, FILM COATED ORAL ONCE
Status: COMPLETED | OUTPATIENT
Start: 2022-06-16 | End: 2022-06-16

## 2022-06-16 RX ORDER — PROCHLORPERAZINE EDISYLATE 5 MG/ML
10 INJECTION INTRAMUSCULAR; INTRAVENOUS ONCE
Status: COMPLETED | OUTPATIENT
Start: 2022-06-16 | End: 2022-06-16

## 2022-06-16 RX ADMIN — SODIUM CHLORIDE 500 ML: 9 INJECTION, SOLUTION INTRAVENOUS at 20:00

## 2022-06-16 RX ADMIN — SUMATRIPTAN SUCCINATE 50 MG: 25 TABLET ORAL at 19:19

## 2022-06-16 RX ADMIN — PROCHLORPERAZINE EDISYLATE 10 MG: 5 INJECTION INTRAMUSCULAR; INTRAVENOUS at 21:15

## 2022-06-16 RX ADMIN — METOPROLOL TARTRATE 50 MG: 50 TABLET, FILM COATED ORAL at 21:15

## 2022-06-16 RX ADMIN — ONDANSETRON 4 MG: 2 INJECTION INTRAMUSCULAR; INTRAVENOUS at 19:48

## 2022-06-16 RX ADMIN — SODIUM CHLORIDE 500 ML: 9 INJECTION, SOLUTION INTRAVENOUS at 19:19

## 2022-06-16 RX ADMIN — OXYCODONE HYDROCHLORIDE 10 MG: 10 TABLET ORAL at 21:15

## 2022-06-16 RX ADMIN — ACETAMINOPHEN 650 MG: 325 TABLET, FILM COATED ORAL at 19:18

## 2022-06-16 ASSESSMENT — FIBROSIS 4 INDEX: FIB4 SCORE: 1.53

## 2022-06-16 NOTE — TELEPHONE ENCOUNTER
FELICITA      Caller: Jana Overton    Reported Symptoms: Jana called in and said that she has had a pulse rate over 100 sitting down for the past week, she went to [facility] on 06-. Jana also stated that she has had a migraine for the past two days, can't keep food down, has been throwing up her medication for the past 2 days. She also said her heart doesn't feel right and she feels cold. She is very concerned about this.    I did try to call your extension but was not able to get a hold of you, I apologize.    Recent Blood Pressure/Heart Rate Reading:  Today, 06- at 6 am, blood pressure was 137/89 and heart rate was 90.       Callback Number:[phone] or [phone]      Thank you,  Nini MICHAEL

## 2022-06-16 NOTE — ED TRIAGE NOTES
Patient presents to triage with reports of   Chief Complaint   Patient presents with   • Sent by MD   • N/V   • Malaise     65 yo female sent by her primary MD for concerns of dehydration due to not being able to eat and having generalized malaise. Also reports a migraine currently.  + N/V that started yesterday has been taking her own nausea meds but not keeping them down.     BP (!) 162/72   Pulse (!) 106   Temp 36.8 °C (98.2 °F) (Temporal)   Resp 18   Ht 1.829 m (6')   Wt 71.2 kg (156 lb 15.5 oz)   SpO2 96%   BMI 21.29 kg/m²      Patient is Covid Vaccinated

## 2022-06-16 NOTE — TELEPHONE ENCOUNTER
----- Message from Prabha Dennison R.N. sent at 6/15/2022  7:08 PM PDT -----  To FELICITA ramírez 9/6

## 2022-06-16 NOTE — TELEPHONE ENCOUNTER
"Called patient mobile number and spoke to the patient.   \"Really sick, Blood pressure and pulse is way higher than normal\".   Sitting pulse is over 100, as high as 128 or 129.   BP and HR today: 145/99, 90  Unable to keep fluids or food down for 2 days, Monday had a migraine, no medications for the last 2 days, throwing up for last 1.5 days. \"siting in darkness, seeing red basically, irritable, real cold at times and tired\". \"want to start crying at times, it isnt like me\".  is at home with her. She stated she is cramping as well, \"retaining fluids around belly and heart, but dehydrated\". Advised to stop spironolactone and she stated she has not taken it. Advised she needs to have her  take her to the ER and she will be added to cancellation list for JB and FELICITA. Answered all questions and concerns, appreciative of call.     See other telephone encounter by FELICITA. Medication list updated.     Routed to schedulers for wait list.   "

## 2022-06-16 NOTE — TELEPHONE ENCOUNTER
Labs reviewed, much improved renal function.     Can we check and see what her blood pressures are?  I think she should continue off spironolactone given her pre-disposition to significant dehydration and acute kidney injury.     Thanks!

## 2022-06-17 ENCOUNTER — TELEPHONE (OUTPATIENT)
Dept: MEDICAL GROUP | Facility: LAB | Age: 67
End: 2022-06-17
Payer: MEDICARE

## 2022-06-17 NOTE — DISCHARGE INSTRUCTIONS
As we discussed, tonight your kidney function does look like you are mildly dehydrated but is not nearly as bad as it has been in the past.  Your electrolytes are normal.  We are happy that you have been able to keep some fluids and here in the emergency department and your symptoms are better controlled.  Please follow-up with your primary care doctor in the next 1 to 2 days for recheck of your renal function to make sure it remains normal and to recheck your symptoms.    Take all of your home medications as prescribed.  Return to the emergency department if you are unable to tolerate oral fluids, you develop severe abdominal pain, fevers, or other concerns.

## 2022-06-17 NOTE — ED NOTES
Pt able to tolerate po fluids pt requested to have her coke tolerating well after IV compazine no s/s of acute distress

## 2022-06-17 NOTE — ED NOTES
PT cleared for DC home pt is stable condition no s/s of acute distress pt advised to f/u with pcp no further questions or concerns. VSS pt reports feeling much better pt pts  to take pt home

## 2022-06-17 NOTE — ED PROVIDER NOTES
"ED Provider Note    CHIEF COMPLAINT  Chief Complaint   Patient presents with   • Sent by MD   • N/V   • Malaise     67 yo female sent by her primary MD for concerns of dehydration due to not being able to eat and having generalized malaise. Also reports a migraine currently.  + N/V that started yesterday has been taking her own nausea meds but not keeping them down.         HPI  Jana Overton is a 66 y.o. female who presents with multiple concerns.  Patient is mainly concerned for dehydration, states that she has had nausea and vomiting since yesterday and been unable to keep any fluids down.  Reports chronic loose stool and chronic abdominal pain which she states is unchanged from her usual.  She has a history of an ostomy secondary to Crohn's disease.  She reports she has had a migraine for the last 2 days and been unable to  her medications at home.  She states she is dehydrated.  States for the last week she has had a heart rate over 100 while sitting down and at rest which is unusual for her.  She states she has had palpitations and chest pain which she feels like is her A. fib.  She is not on anticoagulation for A. fib, states she cannot take it.  She complains of pain \"everywhere\" states she has chronic pain, neuropathy, and has a possible recent diagnosis of lupus versus other autoimmune disease for which she is supposed to see rheumatology per an office visit 6/10/2022.  Patient states that all she need is fluids and rehydration and then she needs to go home to her pool.  States her headache feels the same as her usual migraines, states she was unable to  her triptan medication for this.  Reports she was not having ostomy output but has started since being here in ED.       REVIEW OF SYSTEMS  See HPI for further details. All other systems are negative.     PAST MEDICAL HISTORY   has a past medical history of Anesthesia, Anxiety, Arthritis, ASTHMA, Atrial fib/flut, Backpain, Bronchitis, " Chronic pain, Colostomy in place ([facility]) (10/14/2010), COPD (chronic obstructive pulmonary disease) ([facility]) (6/24/2014), COPD (chronic obstructive pulmonary disease) ([facility]) (6/24/2014), Crohn's disease of colon ([facility]), Dyspnea, History of cardiac murmur, Hypokalemia (6/24/2014), Ileostomy present ([facility]), Infectious disease, Multiple falls (6/24/2014), Narcotic dependence ([facility]) (9/23/2009), Obstruction, Other specified disorder of intestines, Pain (7/11/13), Pneumonia, Postherpetic neuralgia (6/24/2014), Rosacea (6/24/2014), and Sleep apnea.    SOCIAL HISTORY  Social History     Tobacco Use   • Smoking status: Never Smoker   • Smokeless tobacco: Never Used   • Tobacco comment: second hand smoke parents - smoked for only 1 year many years ago   Vaping Use   • Vaping Use: Never used   Substance and Sexual Activity   • Alcohol use: Not Currently     Alcohol/week: 0.6 oz     Types: 1 Shots of liquor per week     Comment: gin and half a lime; tonic water   • Drug use: Not Currently     Types: Marijuana, Inhaled     Comment: medical marijuana through bong   • Sexual activity: Not on file     Comment:        SURGICAL HISTORY   has a past surgical history that includes lumbar fusion anterior; cervical disk and fusion anterior; foot surgery; hand surgery; other abdominal surgery; gastroscopy with biopsy (11/22/08); ileostomy (11/11/2009); dilation and curettage (9/24/2010); gastroscopy (10/4/2011); colonoscopy (10/4/2011); hardware removal neuro (7/16/2012); mammoplasty reduction (7/17/2013); ileostomy (5/14/2014); breast reduction; abdominal exploration; lumpectomy; colon resection; ileostomy (12/29/2018); sigmoidoscopy flex (12/29/2018); exploratory laparotomy (12/29/2018); gastroscopy (1/6/2019); gastroscopy (N/A, 5/22/2019); ileostomy (1/20/2021); lysis adhesions general (1/20/2021); cysto/uretero/pyeloscopy, dx (Right, 12/8/2021); cysto stent placemnt pre surg (Right, 12/8/2021); cystoscopy,insert ureteral stent (Right,  "12/23/2021); and cysto/uretero/pyeloscopy, dx (Right, 12/23/2021).    CURRENT MEDICATIONS  Home Medications     Reviewed by Gretchen Simms R.N. (Registered Nurse) on 06/16/22 at 1551  Med List Status: Not Addressed   Medication Last Dose Status   diphenhydrAMINE-lidocaine-Maalox (MBX) oral susp cup  Active   furosemide (LASIX) 20 MG Tab  Active   hydrALAZINE (APRESOLINE) 10 MG Tab  Active   metoprolol tartrate (LOPRESSOR) 50 MG Tab  Active   naratriptan (AMERGE) 2.5 MG tablet  Active   ondansetron (ZOFRAN ODT) 4 MG TABLET DISPERSIBLE  Active   oxyCODONE immediate release (ROXICODONE) 10 MG immediate release tablet  Active   potassium chloride SA (KDUR) 20 MEQ Tab CR  Active   Probiotic Product (PROBIOTIC PO)  Active   prochlorperazine (COMPAZINE) 10 MG Tab  Active   zinc oxide (DESITIN) 40 % Ointment  Active                ALLERGIES  Allergies   Allergen Reactions   • Azathioprine Sodium Hives and Shortness of Breath   • Infliximab Hives, Shortness of Breath and Rash     .   • Methotrexate Hives, Shortness of Breath and Rash     .   • Methotrexate Hives, Rash and Shortness of Breath     .   • Morphine Shortness of Breath, Swelling and Palpitations   • Promethazine Shortness of Breath and Rash     .   • Roxanol [Morphine Sulfate] Swelling     Throat swells   • Amitriptyline Unspecified     Nightmares     • Carafate [Sucralfate] Vomiting and Nausea     Generalizes/Mouth Sores   • Demerol Vomiting   • Fentanyl Unspecified     Patches caused skin breakdown, no pain relief   • Fentanyl      \"Patches didn't work\"  Skin broke down   • Lorazepam Unspecified     States give me nightmares.    • Meperidine Vomiting   • Methadone Unspecified     Didn't work   • Other Drug Unspecified     steroids   • Pregabalin Rash     Rash     • Pseudoephedrine Vomiting   • Sulfa Drugs Hives and Rash     .  .   • Ketorolac Tromethamine    • Betamethasone Unspecified     Pt unable to remember   • Celestone [Betamethasone Sodium Phosphate] " Unspecified     Pt unable to remember   • Sulfasalazine Rash     On chest       PHYSICAL EXAM  VITAL SIGNS: /80   Pulse 94   Temp 36.8 °C (98.3 °F) (Temporal)   Resp 18   Ht 1.829 m (6')   Wt 71.2 kg (156 lb 15.5 oz)   SpO2 96%   BMI 21.29 kg/m²     Pulse ox interpretation: I interpret this pulse ox as normal.  Constitutional: Alert in no apparent distress.  HENT: No signs of trauma, Bilateral external ears normal, Nose normal.   Eyes: Pupils are equal and reactive, Conjunctiva normal, Non-icteric.   Neck: Normal range of motion, No tenderness, Supple, No stridor.    Cardiovascular: Regular rate and rhythm, no murmurs.   Thorax & Lungs: Normal breath sounds, No respiratory distress, No wheezing, No chest tenderness.   Abdomen: Soft, Diffuse mild tenderness, No masses, No pulsatile masses. Ostomy LLQ  Skin: Warm, Dry, No erythema, No rash.   Extremities: Intact distal pulses, No edema, No tenderness, No cyanosis.  Musculoskeletal: Good range of motion in all major joints. No major deformities noted.   Neurologic: 5/5 bilateral upper and lower extremity strength although difficult to get patient to participate fully, hyperasthetic everywhere BLE BUE when touched, CN II-XII intact, Normal, fluent speech, GCS 15  Psychiatric: Affect normal, Judgment normal, Mood alternates between calm and angry.       DIAGNOSTIC STUDIES / PROCEDURES    LABS  Results for orders placed or performed during the hospital encounter of 06/16/22   CBC WITH DIFFERENTIAL   Result Value Ref Range    WBC 8.9 4.8 - 10.8 K/uL    RBC 4.03 (L) 4.20 - 5.40 M/uL    Hemoglobin 12.5 12.0 - 16.0 g/dL    Hematocrit 38.2 37.0 - 47.0 %    MCV 94.8 81.4 - 97.8 fL    MCH 31.0 27.0 - 33.0 pg    MCHC 32.7 (L) 33.6 - 35.0 g/dL    RDW 45.3 35.9 - 50.0 fL    Platelet Count 244 164 - 446 K/uL    MPV 10.0 9.0 - 12.9 fL    Neutrophils-Polys 66.70 44.00 - 72.00 %    Lymphocytes 23.80 22.00 - 41.00 %    Monocytes 6.60 0.00 - 13.40 %    Eosinophils 1.70 0.00  - 6.90 %    Basophils 0.80 0.00 - 1.80 %    Immature Granulocytes 0.40 0.00 - 0.90 %    Nucleated RBC 0.00 /100 WBC    Neutrophils (Absolute) 5.94 2.00 - 7.15 K/uL    Lymphs (Absolute) 2.12 1.00 - 4.80 K/uL    Monos (Absolute) 0.59 0.00 - 0.85 K/uL    Eos (Absolute) 0.15 0.00 - 0.51 K/uL    Baso (Absolute) 0.07 0.00 - 0.12 K/uL    Immature Granulocytes (abs) 0.04 0.00 - 0.11 K/uL    NRBC (Absolute) 0.00 K/uL   COMP METABOLIC PANEL   Result Value Ref Range    Sodium 138 135 - 145 mmol/L    Potassium 3.5 (L) 3.6 - 5.5 mmol/L    Chloride 104 96 - 112 mmol/L    Co2 22 20 - 33 mmol/L    Anion Gap 12.0 7.0 - 16.0    Glucose 89 65 - 99 mg/dL    Bun 27 (H) 8 - 22 mg/dL    Creatinine 1.42 (H) 0.50 - 1.40 mg/dL    Calcium 9.6 8.4 - 10.2 mg/dL    AST(SGOT) 27 12 - 45 U/L    ALT(SGPT) 23 2 - 50 U/L    Alkaline Phosphatase 93 30 - 99 U/L    Total Bilirubin 0.5 0.1 - 1.5 mg/dL    Albumin 4.3 3.2 - 4.9 g/dL    Total Protein 7.6 6.0 - 8.2 g/dL    Globulin 3.3 1.9 - 3.5 g/dL    A-G Ratio 1.3 g/dL   LIPASE   Result Value Ref Range    Lipase 18 7 - 58 U/L   TROPONIN   Result Value Ref Range    Troponin T 13 6 - 19 ng/L   MAGNESIUM   Result Value Ref Range    Magnesium 1.7 1.5 - 2.5 mg/dL   LACTIC ACID   Result Value Ref Range    Lactic Acid 1.7 0.5 - 2.0 mmol/L   ESTIMATED GFR   Result Value Ref Range    GFR (CKD-EPI) 40 (A) >60 mL/min/1.73 m 2   URINALYSIS (UA)    Specimen: Urine   Result Value Ref Range    Color Yellow     Character Clear     Specific Gravity >=1.030 <1.035    Ph 5.5 5.0 - 8.0    Glucose Negative Negative mg/dL    Ketones Negative Negative mg/dL    Protein Negative Negative mg/dL    Bilirubin Negative Negative    Nitrite Negative Negative    Leukocyte Esterase Trace (A) Negative    Occult Blood Negative Negative    Micro Urine Req Microscopic    URINE MICROSCOPIC (W/UA)   Result Value Ref Range    WBC 5-10 (A) /hpf    RBC 0-2 /hpf    Bacteria Few (A) None /hpf    Epithelial Cells Few Few /hpf   EKG (NOW)   Result  "Value Ref Range    Report       [facility] Emergency Dept.    Test Date:  2022  Pt Name:    YONATHAN JUDD                [facility]  MRN:        [id number]                      Room:       -ROOM 6  Gender:     Female                       Technician: YUNIER  :        [id number]                   Requested By:KEARA LOVELL  Order #:    [id number]                    Reading MD: Keara Lovell    Measurements  Intervals                                Axis  Rate:       108                          P:          81  OH:         186                          QRS:        73  QRSD:       92                           T:          60  QT:         337  QTc:        452    Interpretive Statements  Sinus tachycardia rate of 108, normal axis, normal intervals, no ST  elevations,  depressions, T wave inversions  Electronically Signed On 2022 19:43:11 PDT by Keara Lovell       *Note: Due to a large number of results and/or encounters for the requested time period, some results have not been displayed. A complete set of results can be found in Results Review.         RADIOLOGY  No orders to display           COURSE & MEDICAL DECISION MAKING  Pertinent Labs & Imaging studies reviewed. (See chart for details)  9:17 PM  Patient is tolerating oral fluids. Requesting to leave once IVF finish. When I was discussing discharge plan with patient she became angry asking me \"why would you not give pain medication when someone comes in for nausea and vomiting?\"  Explained to patient that we do not treat nausea and vomiting with pain medication rather with antiemetics.  Also advised her that she did receive pain medications in the form of acetaminophen and triptan for her reported migraine and that narcotics are not the preferred treatment for migraines due to possibility of rebound headache.  Patient then stated to me \"while I used to shoot up 5 mg of Dilaudid in my IV in the past but I am holistic\".  " Continued to remain angry at treatment plan and that she did not receive more narcotics for her vomiting.      66-year-old female with multiple medical problems presenting with multiple concerns however the most prominent of her seem to be migraine as well as nausea and vomiting over the last 1 to 2 days resulting in possible dehydration causing her to have a high heart rate at home.  Headache feels similar to her usual migraines and she has no obvious focal neurologic deficits on exam.  No concern for meningitis.  She was treated with migraine medication and antiemetics and was able to tolerate p.o. fluids.  She was also rehydrated with IV fluids in the ED.  Her creatinine was very slightly elevated from her prior, but did not meet criteria for FLORES and she had no significant electrolyte abnormalities.  She had no evidence of urinary tract infection and her labs were otherwise normal.  She reported generalized abdominal pain but states this was the same as her chronic Crohn's pain and her ostomy is putting out stool do not suspect obstruction at this time or need for abdominal imaging especially given reassuring labs.  She did have some mild tachycardia initially in the ED which may certainly be related to dehydration but this resolved after fluid hydration.  She was additionally treated with her home medications of metoprolol and oxycodone which she takes for chronic pain.  She was advised that oxycodone is not a migraine medication and can in fact cause rebound headaches in the case of migraines.  She was able to tolerate p.o. fluids in the ED, and was discharged home and instructed to follow-up closely with her primary care doctor for recheck of her labs.  Of note patient has had also had multiple ED visits here for very similar complaints over the last month.       @HYDRATION: Based on the patient's presentation of Acute Vomiting and Dehydration the patient was given IV fluids. IV Hydration was used because oral  hydration was not adequate alone. Upon recheck following hydration, the patient was improved.@      The patient will not drink alcohol nor drive with prescribed medications. The patient will return for worsening symptoms and is stable at the time of discharge. The patient verbalizes understanding and will comply.    FINAL IMPRESSION  1. Non-intractable vomiting with nausea, unspecified vomiting type     2. Other chronic pain     3. Dehydration           Electronically signed by: Sharita Huerta M.D., 6/16/2022 6:38 PM

## 2022-06-18 NOTE — TELEPHONE ENCOUNTER
Telephone call returned answering machine only, recommended half-strength Gatorade trying to get ahead of her dehydration half a glass with water or the other half of Gatorade.  Patient cannot get ahead of her dehydration she will need to be seen in emergency room setting.

## 2022-06-18 NOTE — TELEPHONE ENCOUNTER
Jana left several messages regarding a horrible migraine and dehydration.  It looks like she went to the ER yesterday.

## 2022-06-21 LAB
BACTERIA BLD CULT: NORMAL
SIGNIFICANT IND 70042: NORMAL
SITE SITE: NORMAL
SOURCE SOURCE: NORMAL

## 2022-07-08 ENCOUNTER — OFFICE VISIT (OUTPATIENT)
Dept: CARDIOLOGY | Facility: MEDICAL CENTER | Age: 67
End: 2022-07-08
Payer: MEDICARE

## 2022-07-08 VITALS
HEIGHT: 72 IN | SYSTOLIC BLOOD PRESSURE: 108 MMHG | WEIGHT: 160 LBS | HEART RATE: 72 BPM | RESPIRATION RATE: 14 BRPM | OXYGEN SATURATION: 97 % | DIASTOLIC BLOOD PRESSURE: 50 MMHG | BODY MASS INDEX: 21.67 KG/M2

## 2022-07-08 DIAGNOSIS — R33.9 URINARY RETENTION: ICD-10-CM

## 2022-07-08 DIAGNOSIS — N13.9 LOWER URINARY OBSTRUCTIVE SYMPTOM: ICD-10-CM

## 2022-07-08 DIAGNOSIS — R39.11 URINARY HESITANCY: ICD-10-CM

## 2022-07-08 DIAGNOSIS — M79.89 LEG SWELLING: ICD-10-CM

## 2022-07-08 PROCEDURE — 99214 OFFICE O/P EST MOD 30 MIN: CPT | Performed by: PHYSICIAN ASSISTANT

## 2022-07-08 ASSESSMENT — ENCOUNTER SYMPTOMS
ORTHOPNEA: 0
FLANK PAIN: 0
FEVER: 0
CHILLS: 0
NAUSEA: 1
HEADACHES: 1
PND: 0
ABDOMINAL PAIN: 1
PALPITATIONS: 1

## 2022-07-08 ASSESSMENT — FIBROSIS 4 INDEX: FIB4 SCORE: 1.57

## 2022-07-08 NOTE — PROGRESS NOTES
"Chief Complaint   Patient presents with   • Atrial Fibrillation       Follow up      Subjective:   Jana Overton is a 66 y.o. female who presents today for follow-up.    Patient of Dr. Monahan.  Current medical problems include hypertension,  DVT, paroxysmal atrial fibrillation not on chronic anticoagulation, TRUDY, COPD, and Crohn's disease s/p ileostomy who is here for follow-up.    Has had multiple ER visits for intractable migraine, nausea vomiting.  Her spironolactone was discontinued due to persistent FLORES.    Today Jana reports a 10-11 pound weight gain since the spironolactone was discontinued.  She does not have any shortness of breath but feels very exhausted, she was only able to complete 30 minutes on the treadmill (usually she will follow this with a workout in the pool).  She is very \"swollen\" she can feel it in her legs, abdomen and arms.  She is nervous to take Lasix because this dehydrates her too much and increases her colostomy output as well.    She describes urinary hesitancy, there is no dysuria or hematuria.  She does have back pain but no flank pain.  No fevers or chills.    We review her blood pressure chart today for which she has had 2 elevated readings in the last 2 weeks and used hydralazine.    Past Medical History:   Diagnosis Date   • Anesthesia     difficult Iv stick   • Anxiety    • Arthritis     all over   • ASTHMA    • Atrial fib/flut    • Backpain    • Bronchitis     5 years   • Chronic pain    • Colostomy in place (Formerly [facility]) 10/14/2010   • COPD (chronic obstructive pulmonary disease) (Formerly [facility]) 6/24/2014   • COPD (chronic obstructive pulmonary disease) (Formerly [facility]) 6/24/2014   • Crohn's disease of colon (Formerly [facility])    • Dyspnea    • History of cardiac murmur    • Hypokalemia 6/24/2014   • Ileostomy present (Formerly [facility])    • Infectious disease     MRSA, VancoRSA   • Multiple falls 6/24/2014   • Narcotic dependence (Formerly [facility]) 9/23/2009   • Obstruction    • Other specified disorder of intestines     crohns disease   • " Pain 7/11/13    all over   • Pneumonia     child and mid 30's   • Postherpetic neuralgia 6/24/2014   • Rosacea 6/24/2014   • Sleep apnea          Past Surgical History:   Procedure Laterality Date   • [place] CYSTOSCOPY,INSERT URETERAL STENT Right 12/23/2021    Procedure: CYSTOSCOPY, WITH URETERAL STENT EXCHANGE;  Surgeon: Jonathan Turcios M.D.;  Location: [facility];  Service: Urology   • KY CYSTO/URETERO/PYELOSCOPY, DX Right 12/23/2021    Procedure: URETEROSCOPY;  Surgeon: Jonathan Turcios M.D.;  Location: [facility];  Service: Urology   • KY CYSTO/URETERO/PYELOSCOPY, DX Right 12/8/2021    Procedure: URETEROSCOPY;  Surgeon: Luc Hook M.D.;  Location: [facility];  Service: Urology   • CYSTO STENT PLACEMNT PRE SURG Right 12/8/2021    Procedure: CYSTOSCOPY, WITH URETERAL STENT INSERTION;  Surgeon: Luc Hook M.D.;  Location: [facility];  Service: Urology   • ILEOSTOMY  1/20/2021    Procedure: ILEOSTOMY- COMPLEX REVISION,;  Surgeon: Kevin Cruz M.D.;  Location: [facility];  Service: General   • LYSIS ADHESIONS GENERAL  1/20/2021    Procedure: LYSIS, ADHESIONS;  Surgeon: Kevin Cruz M.D.;  Location: [facility];  Service: General   • GASTROSCOPY N/A 5/22/2019    Procedure: GASTROSCOPY - WITH DILATION;  Surgeon: Dionicio Cardona M.D.;  Location: [facility];  Service: Gastroenterology   • GASTROSCOPY  1/6/2019    Procedure: GASTROSCOPY- WITH DILATION;  Surgeon: Daniel Daniels M.D.;  Location: [facility];  Service: Gastroenterology   • ILEOSTOMY  12/29/2018    Procedure: ILEOSTOMY- REVISION;  Surgeon: Kevin Cruz M.D.;  Location: [facility];  Service: General   • SIGMOIDOSCOPY FLEX  12/29/2018    Procedure: SIGMOIDOSCOPY FLEX;  Surgeon: Kevin Cruz M.D.;  Location: [facility];  Service: General   • EXPLORATORY LAPAROTOMY  12/29/2018    Procedure: EXPLORATORY LAPAROTOMY, lysis of adhesions;   Surgeon: Kevin Cruz M.D.;  Location: SURGERY [facility];  Service: General   • ILEOSTOMY  5/14/2014    Performed by Kevin Cruz M.D. at SURGERY [facility]   • MAMMOPLASTY REDUCTION  7/17/2013    Performed by Janey Burt M.D. at SURGERY [facility]   • HARDWARE REMOVAL NEURO  7/16/2012    Performed by KEELEY KIM at SURGERY [facility]   • GASTROSCOPY  10/4/2011    Performed by TYSON MARTINEZ at SURGERY SAME DAY [facility] ORS   • COLONOSCOPY  10/4/2011    Performed by TYSON MARTINEZ at SURGERY SAME DAY [facility] ORS   • DILATION AND CURETTAGE  9/24/2010    Performed by GAURAV ALY at SURGERY SAME DAY [facility]   • ILEOSTOMY  11/11/2009    Performed by KEVIN CRUZ at SURGERY [facility]   • GASTROSCOPY WITH BIOPSY  11/22/08    Performed by NEGRITA HUYNH at SURGERY [facility]   • ABDOMINAL EXPLORATION     • CERVICAL DISK AND FUSION ANTERIOR     • COLON RESECTION     • FOOT SURGERY     • HAND SURGERY     • LUMBAR FUSION ANTERIOR     • LUMPECTOMY     • OTHER ABDOMINAL SURGERY      surgery for chrons disease   • NV BREAST REDUCTION           Family History   Problem Relation Age of Onset   • Lung Disease Mother         copd   • Diabetes Father    • Heart Disease Father    • Diabetes Sister    • Cancer Maternal Aunt 40        breast         Social History     Tobacco Use   Smoking Status Never Smoker   Smokeless Tobacco Never Used   Tobacco Comment    second hand smoke parents - smoked for only 1 year many years ago          Social History     Substance and Sexual Activity   Alcohol Use Not Currently   • Alcohol/week: 0.6 oz   • Types: 1 Shots of liquor per week    Comment: gin and half a lime; tonic water         Allergies   Allergen Reactions   • Azathioprine Sodium Hives and Shortness of Breath   • Infliximab Hives, Shortness of Breath and Rash     .   • Methotrexate Hives, Shortness of Breath and Rash     .   • Methotrexate Hives, Rash and Shortness of  "Breath     .   • Morphine Shortness of Breath, Swelling and Palpitations   • Promethazine Shortness of Breath and Rash     .   • Roxanol [Morphine Sulfate] Swelling     Throat swells   • Amitriptyline Unspecified     Nightmares     • Carafate [Sucralfate] Vomiting and Nausea     Generalizes/Mouth Sores   • Demerol Vomiting   • Fentanyl Unspecified     Patches caused skin breakdown, no pain relief   • Fentanyl      \"Patches didn't work\"  Skin broke down   • Lorazepam Unspecified     States give me nightmares.    • Meperidine Vomiting   • Methadone Unspecified     Didn't work   • Other Drug Unspecified     steroids   • Pregabalin Rash     Rash     • Pseudoephedrine Vomiting   • Sulfa Drugs Hives and Rash     .  .   • Ketorolac Tromethamine    • Betamethasone Unspecified     Pt unable to remember   • Celestone [Betamethasone Sodium Phosphate] Unspecified     Pt unable to remember   • Sulfasalazine Rash     On chest         Outpatient Encounter Medications as of 7/8/2022   Medication Sig Dispense Refill   • naratriptan (AMERGE) 2.5 MG tablet Take 1 Tablet by mouth as needed for Migraine. 5 Tablet 3   • hydrALAZINE (APRESOLINE) 10 MG Tab Take 1 Tablet by mouth 3 times a day as needed (systolic blood pressure >150.). 270 Tablet 3   • furosemide (LASIX) 20 MG Tab Take 1 Tablet by mouth 1 time a day as needed (leg swelling/weight gain). 90 Tablet 3   • potassium chloride SA (KDUR) 20 MEQ Tab CR Take 1 Tablet by mouth 1 time a day as needed (when you take lasix.). 90 Tablet 3   • ondansetron (ZOFRAN ODT) 4 MG TABLET DISPERSIBLE Take 1 Tablet by mouth every 6 hours as needed for Nausea. 20 Tablet 0   • metoprolol tartrate (LOPRESSOR) 50 MG Tab TAKE 1 TABLET BY MOUTH TWICE DAILY, HOLD IF BLOOD PRESSURE LESS THAN 95/50 (Patient taking differently: Take 50 mg by mouth 2 times a day. TAKE 1 TABLET BY MOUTH TWICE DAILY, HOLD IF BLOOD PRESSURE LESS THAN 95/50) 180 Tablet 0   • prochlorperazine (COMPAZINE) 10 MG Tab Take 1 Tablet by " mouth 1 time a day as needed. (Patient taking differently: Take 10 mg by mouth 1 time a day as needed for Nausea/Vomiting.) 10 Tablet 0   • oxyCODONE immediate release (ROXICODONE) 10 MG immediate release tablet Take 10 mg by mouth every four hours as needed for Severe Pain. Indications: Chronic Pain     • Probiotic Product (PROBIOTIC PO) Take 2 Capsules by mouth every day.     • [DISCONTINUED] zinc oxide (DESITIN) 40 % Ointment Apply 5 gm portion to affected area QD (Patient not taking: No sig reported) 56 g 3   • [DISCONTINUED] diphenhydrAMINE-lidocaine-Maalox (MBX) oral susp cup Take 5 mL by mouth every 6 hours as needed (orAL pain). (Patient not taking: No sig reported) 900 mL 0     No facility-administered encounter medications on file as of 7/8/2022.         Review of Systems   Constitutional: Negative for chills and fever.        10lbs weight gain   Cardiovascular: Positive for palpitations and leg swelling. Negative for chest pain, orthopnea and PND.   Gastrointestinal: Positive for abdominal pain and nausea.   Genitourinary: Negative for flank pain.        Urinary hesitancy, decreased frequency   Musculoskeletal: Positive for joint pain.   Skin: Positive for rash.   Neurological: Positive for headaches.      Objective:   /50 (BP Location: Left arm, Patient Position: Sitting)   Pulse 72   Resp 14   Ht 1.829 m (6')   Wt 72.6 kg (160 lb)   SpO2 97%   BMI 21.70 kg/m²     Physical Exam  Vitals and nursing note reviewed.   Constitutional:       General: She is not in acute distress.     Appearance: Normal appearance.   HENT:      Head: Normocephalic and atraumatic.      Mouth/Throat:      Mouth: Mucous membranes are moist.   Eyes:      General: No scleral icterus.  Neck:      Comments: No JVD  Cardiovascular:      Rate and Rhythm: Normal rate and regular rhythm.      Pulses: Normal pulses.      Heart sounds: No murmur heard.  Pulmonary:      Effort: Pulmonary effort is normal. No respiratory distress.       Breath sounds: Normal breath sounds. No rales.   Abdominal:      General: There is no distension.      Palpations: Abdomen is soft.   Musculoskeletal:      Comments: Trace edema in bilateral ankles , no pretibia edema   Skin:     General: Skin is warm and dry.      Capillary Refill: Capillary refill takes less than 2 seconds.      Comments: Extensive varicosities on lower extremities, right more than left extending in to feet and up to posterior fossa   Neurological:      General: No focal deficit present.      Mental Status: She is alert and oriented to person, place, and time.   Psychiatric:         Judgment: Judgment normal.       Zio patch 1/3/18  - 19sec of possible atrial fibrillation  - symptomatic SVT     Cardiac PET 2/5/2018  ELECTROCARDIOGRAPHIC FINDINGS:  EKG review shows a normal sinus rhythm with no   ischemic ST segment changes at rest or stress.     SCINTOGRAPHIC FINDINGS:  There is normal homogenous tracer uptake at rest and   stress.     GATED WALL MOTION FINDINGS:  Show an ejection fraction of 72% at rest, which increases to 78% at peak stress.     CONCLUSIONS AND IMPRESSION:  Normal cardiac stress test.     Zio Patch 12/16/20  13 days and 9 hours of ziopatch recordings reviewed.   * Most symptoms associated with sinus rhythm in the 80s, however one episode was associated with heart rates up to 180s. Read as sinus by computer, but based on tracings I believe this was a supraventricular tachycardia. These were on 11/28 at around 2:35PM. Correlate clinically.   * No clear atrial fibrillation.   * Rare PACs/PVCs   * No ventricular tachycardia, automated read incorrectly interpreted artifact.     Assessment:     1. Urinary retention  URINALYSIS,CULTURE IF INDICATED    proBrain Natriuretic Peptide, NT    Basic Metabolic Panel    CBC WITHOUT DIFFERENTIAL   2. Leg swelling  URINALYSIS,CULTURE IF INDICATED    proBrain Natriuretic Peptide, NT    Basic Metabolic Panel   3. Lower urinary obstructive  symptom  CBC WITHOUT DIFFERENTIAL   4. Urinary hesitancy          Medical Decision Making:  Today's Assessment / Plan:   Palpitations  Atrial fibrillation, paroxysmal ?  PSVT  - 2 zio patch in the last 4 years without clear evidence of clinically significant AF  - [id number] 3 (age, gender, HTN since she is in k[place] 50mg). Declines anticoagulation given her history of crohns, and she has opted for only anticoagulation with aspirin.  -continue metoprolol to 50mg PO bid for rate control with PRN metoprolol for palpitations     HTN  -Spironolactone discontinued due to frequent FLORES's  -hydralazine 10mg PO prn SBP >150  -continue metoprolol.       Leg swelling  - 2/2 lymphedema vs. Venous insufficiency   - There is no dependent pitting edema on exam today. May use Lasix as needed, she is concerned that this is too strong of a diuretic for her, she can use half of a tablet.  We also discussed using spironolactone for 2 to 3 days if that seems to be a better diuretic for her  - using Torsemide/potassium PRN      TRUDY  -continue CPAP, many of symptoms much improved.     Urinary hesitancy  - check CBC and UA     RTC in 6mo

## 2022-07-11 ENCOUNTER — APPOINTMENT (RX ONLY)
Dept: URBAN - METROPOLITAN AREA CLINIC 20 | Facility: CLINIC | Age: 67
Setting detail: DERMATOLOGY
End: 2022-07-11

## 2022-07-11 DIAGNOSIS — L71.8 OTHER ROSACEA: ICD-10-CM

## 2022-07-11 PROCEDURE — ? ADDITIONAL NOTES

## 2022-07-11 PROCEDURE — ? COUNSELING

## 2022-07-11 PROCEDURE — ? PRESCRIPTION

## 2022-07-11 PROCEDURE — 99213 OFFICE O/P EST LOW 20 MIN: CPT

## 2022-07-11 RX ORDER — CLINDAMYCIN PHOSPHATE AND BENZOYL PEROXIDE 1 %-5 %
1 KIT TOPICAL
Qty: 50 | Refills: 3 | Status: ERX | COMMUNITY
Start: 2022-07-11

## 2022-07-11 RX ADMIN — CLINDAMYCIN PHOSPHATE AND BENZOYL PEROXIDE 1: KIT at 00:00

## 2022-07-11 ASSESSMENT — LOCATION SIMPLE DESCRIPTION DERM
LOCATION SIMPLE: LEFT CHEEK
LOCATION SIMPLE: RIGHT CHEEK

## 2022-07-11 ASSESSMENT — LOCATION DETAILED DESCRIPTION DERM
LOCATION DETAILED: RIGHT INFERIOR CENTRAL MALAR CHEEK
LOCATION DETAILED: LEFT INFERIOR CENTRAL MALAR CHEEK

## 2022-07-11 ASSESSMENT — LOCATION ZONE DERM: LOCATION ZONE: FACE

## 2022-07-11 NOTE — PROCEDURE: ADDITIONAL NOTES
Render Risk Assessment In Note?: no
Additional Notes: Pt counseled in continuing current regimen of azelaic acid and cool compresses, adding Clindamycin BP as a spot treatment needed. Pt states she does not need any refills of azelaic acid on todays visit.
Detail Level: Detailed

## 2022-07-11 NOTE — HPI: RASH (ROSACEA)
How Severe Is Your Rosacea?: moderate
Is This A New Presentation, Or A Follow-Up?: Follow Up Rosacea
Additional History: Pt states she uses cool compresses on areas with severe pustules.

## 2022-07-13 ENCOUNTER — RX ONLY (OUTPATIENT)
Age: 67
Setting detail: RX ONLY
End: 2022-07-13

## 2022-07-13 RX ORDER — CLINDAMYCIN PHOSPHATE AND BENZOYL PEROXIDE 1 %-5 %
KIT TOPICAL
Qty: 50 | Refills: 3 | Status: ERX

## 2022-07-14 ENCOUNTER — HOSPITAL ENCOUNTER (OUTPATIENT)
Dept: LAB | Facility: MEDICAL CENTER | Age: 67
End: 2022-07-14
Attending: INTERNAL MEDICINE
Payer: MEDICARE

## 2022-07-14 ENCOUNTER — HOSPITAL ENCOUNTER (OUTPATIENT)
Dept: LAB | Facility: MEDICAL CENTER | Age: 67
End: 2022-07-14
Attending: PHYSICIAN ASSISTANT
Payer: MEDICARE

## 2022-07-14 DIAGNOSIS — E55.9 VITAMIN D DEFICIENCY: ICD-10-CM

## 2022-07-14 DIAGNOSIS — N13.9 LOWER URINARY OBSTRUCTIVE SYMPTOM: ICD-10-CM

## 2022-07-14 DIAGNOSIS — R33.9 URINARY RETENTION: ICD-10-CM

## 2022-07-14 DIAGNOSIS — M79.89 LEG SWELLING: ICD-10-CM

## 2022-07-14 DIAGNOSIS — Z13.29 SCREENING FOR THYROID DISORDER: ICD-10-CM

## 2022-07-14 LAB
25(OH)D3 SERPL-MCNC: 40 NG/ML (ref 30–100)
ANION GAP SERPL CALC-SCNC: 12 MMOL/L (ref 7–16)
APPEARANCE UR: CLEAR
BILIRUB UR QL STRIP.AUTO: NEGATIVE
BUN SERPL-MCNC: 24 MG/DL (ref 8–22)
CALCIUM SERPL-MCNC: 9.5 MG/DL (ref 8.5–10.5)
CHLORIDE SERPL-SCNC: 101 MMOL/L (ref 96–112)
CO2 SERPL-SCNC: 23 MMOL/L (ref 20–33)
COLOR UR: YELLOW
CREAT SERPL-MCNC: 1.19 MG/DL (ref 0.5–1.4)
ERYTHROCYTE [DISTWIDTH] IN BLOOD BY AUTOMATED COUNT: 43.9 FL (ref 35.9–50)
GFR SERPLBLD CREATININE-BSD FMLA CKD-EPI: 50 ML/MIN/1.73 M 2
GLUCOSE SERPL-MCNC: 84 MG/DL (ref 65–99)
GLUCOSE UR STRIP.AUTO-MCNC: NEGATIVE MG/DL
HCT VFR BLD AUTO: 41.8 % (ref 37–47)
HGB BLD-MCNC: 13.7 G/DL (ref 12–16)
KETONES UR STRIP.AUTO-MCNC: NEGATIVE MG/DL
LEUKOCYTE ESTERASE UR QL STRIP.AUTO: NEGATIVE
MCH RBC QN AUTO: 30.7 PG (ref 27–33)
MCHC RBC AUTO-ENTMCNC: 32.8 G/DL (ref 33.6–35)
MCV RBC AUTO: 93.7 FL (ref 81.4–97.8)
MICRO URNS: NORMAL
NITRITE UR QL STRIP.AUTO: NEGATIVE
NT-PROBNP SERPL IA-MCNC: 161 PG/ML (ref 0–125)
PH UR STRIP.AUTO: 6 [PH] (ref 5–8)
PLATELET # BLD AUTO: 209 K/UL (ref 164–446)
PMV BLD AUTO: 11.4 FL (ref 9–12.9)
POTASSIUM SERPL-SCNC: 3.5 MMOL/L (ref 3.6–5.5)
PROT UR QL STRIP: NEGATIVE MG/DL
RBC # BLD AUTO: 4.46 M/UL (ref 4.2–5.4)
RBC UR QL AUTO: NEGATIVE
SODIUM SERPL-SCNC: 136 MMOL/L (ref 135–145)
SP GR UR STRIP.AUTO: 1.01
T4 FREE SERPL-MCNC: 1.27 NG/DL (ref 0.93–1.7)
TSH SERPL DL<=0.005 MIU/L-ACNC: 0.63 UIU/ML (ref 0.38–5.33)
UROBILINOGEN UR STRIP.AUTO-MCNC: 0.2 MG/DL
WBC # BLD AUTO: 7.4 K/UL (ref 4.8–10.8)

## 2022-07-14 PROCEDURE — 83880 ASSAY OF NATRIURETIC PEPTIDE: CPT | Mod: GA

## 2022-07-14 PROCEDURE — 81003 URINALYSIS AUTO W/O SCOPE: CPT

## 2022-07-14 PROCEDURE — 82306 VITAMIN D 25 HYDROXY: CPT

## 2022-07-14 PROCEDURE — 85027 COMPLETE CBC AUTOMATED: CPT

## 2022-07-14 PROCEDURE — 84439 ASSAY OF FREE THYROXINE: CPT | Mod: GA

## 2022-07-14 PROCEDURE — 80048 BASIC METABOLIC PNL TOTAL CA: CPT

## 2022-07-14 PROCEDURE — 84443 ASSAY THYROID STIM HORMONE: CPT | Mod: GA

## 2022-07-14 PROCEDURE — 36415 COLL VENOUS BLD VENIPUNCTURE: CPT | Mod: GA

## 2022-07-15 NOTE — RESULT ENCOUNTER NOTE
Jana, your labs are normal except for a mildly reduced potassium level. You may take 1 -2 days of your potassium supplement to help with that. No signs of infection or heart failure based on labs.     Latoya

## 2022-07-18 ENCOUNTER — HOSPITAL ENCOUNTER (OUTPATIENT)
Facility: MEDICAL CENTER | Age: 67
End: 2022-07-18
Attending: PHYSICIAN ASSISTANT
Payer: MEDICARE

## 2022-07-18 ENCOUNTER — OFFICE VISIT (OUTPATIENT)
Dept: MEDICAL GROUP | Facility: LAB | Age: 67
End: 2022-07-18
Payer: MEDICARE

## 2022-07-18 VITALS
DIASTOLIC BLOOD PRESSURE: 70 MMHG | BODY MASS INDEX: 21.26 KG/M2 | HEIGHT: 72 IN | TEMPERATURE: 97.6 F | SYSTOLIC BLOOD PRESSURE: 120 MMHG | WEIGHT: 157 LBS | RESPIRATION RATE: 16 BRPM | OXYGEN SATURATION: 96 % | HEART RATE: 78 BPM

## 2022-07-18 DIAGNOSIS — N89.8 VAGINAL DISCHARGE: ICD-10-CM

## 2022-07-18 DIAGNOSIS — J01.01 ACUTE RECURRENT MAXILLARY SINUSITIS: ICD-10-CM

## 2022-07-18 PROCEDURE — 87660 TRICHOMONAS VAGIN DIR PROBE: CPT

## 2022-07-18 PROCEDURE — 87510 GARDNER VAG DNA DIR PROBE: CPT

## 2022-07-18 PROCEDURE — 99213 OFFICE O/P EST LOW 20 MIN: CPT | Performed by: PHYSICIAN ASSISTANT

## 2022-07-18 PROCEDURE — 87480 CANDIDA DNA DIR PROBE: CPT

## 2022-07-18 ASSESSMENT — FIBROSIS 4 INDEX: FIB4 SCORE: 1.83

## 2022-07-18 NOTE — PROGRESS NOTES
Subjective:     CC: possible yeast infection    HPI:   Jana here today with      Was recently treated with course of abx and developed yellowish/white thick vaginal discharge.  Denies vaginal odor, vulvovaginal irritation or dysuria.  Patient does note increased urinary frequency but was also recently switched to Lasix.  Patient has tried over-the-counter yeast treatment with no improvement in symptoms        ROS:  ROS negative except as indicated above    Current Outpatient Medications Ordered in Epic   Medication Sig Dispense Refill   • amoxicillin-clavulanate (AUGMENTIN) 875-125 MG Tab TAKE ONE TABLET BY MOUTH TWICE A DAY 14 Tablet 0   • naratriptan (AMERGE) 2.5 MG tablet Take 1 Tablet by mouth as needed for Migraine. 5 Tablet 3   • hydrALAZINE (APRESOLINE) 10 MG Tab Take 1 Tablet by mouth 3 times a day as needed (systolic blood pressure >150.). 270 Tablet 3   • furosemide (LASIX) 20 MG Tab Take 1 Tablet by mouth 1 time a day as needed (leg swelling/weight gain). 90 Tablet 3   • potassium chloride SA (KDUR) 20 MEQ Tab CR Take 1 Tablet by mouth 1 time a day as needed (when you take lasix.). 90 Tablet 3   • ondansetron (ZOFRAN ODT) 4 MG TABLET DISPERSIBLE Take 1 Tablet by mouth every 6 hours as needed for Nausea. 20 Tablet 0   • metoprolol tartrate (LOPRESSOR) 50 MG Tab TAKE 1 TABLET BY MOUTH TWICE DAILY, HOLD IF BLOOD PRESSURE LESS THAN 95/50 (Patient taking differently: Take 50 mg by mouth 2 times a day. TAKE 1 TABLET BY MOUTH TWICE DAILY, HOLD IF BLOOD PRESSURE LESS THAN 95/50) 180 Tablet 0   • prochlorperazine (COMPAZINE) 10 MG Tab Take 1 Tablet by mouth 1 time a day as needed. (Patient taking differently: Take 10 mg by mouth 1 time a day as needed for Nausea/Vomiting.) 10 Tablet 0   • oxyCODONE immediate release (ROXICODONE) 10 MG immediate release tablet Take 10 mg by mouth every four hours as needed for Severe Pain. Indications: Chronic Pain     • Probiotic Product (PROBIOTIC PO) Take 2 Capsules by mouth  every day.       No current [facility]-ordered facility-administered medications on file.         Objective:     Exam:  /70 (BP Location: Right arm, Patient Position: Sitting, BP Cuff Size: Adult)   Pulse 78   Temp 36.4 °C (97.6 °F) (Temporal)   Resp 16   Ht 1.829 m (6')   Wt 71.2 kg (157 lb)   SpO2 96%   BMI 21.29 kg/m²  Body mass index is 21.29 kg/m².    Gen: Alert and oriented, No apparent distress.  Neck: Neck is supple without lymphadenopathy.  Lungs: Normal effort, CTA bilaterally, no wheezes, rhonchi, or rales  CV: Regular rate and rhythm. No murmurs, rubs, or gallops.  Ext: No clubbing, cyanosis, edema.  Pelvic exam:  Perineum: No external lesions are noted, color is symmetrical throughout  Vagina: Vaginal vault is well rugated.  Thick yellow/white discharge noted  Cervix: Parous, nonfriable        Assessment & Plan:     66 y.o. female with the following -     Problem List Items Addressed This Visit    None     Visit Diagnoses     Vaginal discharge        Relevant Orders    VAGINAL PATHOGENS DNA PANEL      Treatment pending vaginal pathogens panel results    I spent a total of 15 minutes with record review, exam, communication with the patient, communication with other providers, and documentation of this encounter.      No follow-ups on file.    Please note that this dictation was created using voice recognition software. I have made every reasonable attempt to correct obvious errors, but there may be errors of grammar and possibly content that I did not discover before finalizing the note.

## 2022-07-19 DIAGNOSIS — N89.8 VAGINAL DISCHARGE: ICD-10-CM

## 2022-07-19 LAB
CANDIDA DNA VAG QL PROBE+SIG AMP: NEGATIVE
G VAGINALIS DNA VAG QL PROBE+SIG AMP: NEGATIVE
T VAGINALIS DNA VAG QL PROBE+SIG AMP: NEGATIVE

## 2022-07-19 RX ORDER — AMOXICILLIN AND CLAVULANATE POTASSIUM 875; 125 MG/1; MG/1
TABLET, FILM COATED ORAL
Qty: 14 TABLET | Refills: 0 | Status: SHIPPED | OUTPATIENT
Start: 2022-07-19 | End: 2022-07-27

## 2022-07-21 ENCOUNTER — TELEPHONE (OUTPATIENT)
Dept: MEDICAL GROUP | Facility: LAB | Age: 67
End: 2022-07-21
Payer: MEDICARE

## 2022-07-21 DIAGNOSIS — N89.8 VAGINAL DISCHARGE: ICD-10-CM

## 2022-07-21 NOTE — TELEPHONE ENCOUNTER
Marley:  Please call Jana, her referral to gynecology has been signed.   Regards, Chase Charles, DO

## 2022-07-22 ENCOUNTER — OFFICE VISIT (OUTPATIENT)
Dept: MEDICAL GROUP | Facility: LAB | Age: 67
End: 2022-07-22
Payer: MEDICARE

## 2022-07-22 VITALS
HEART RATE: 80 BPM | SYSTOLIC BLOOD PRESSURE: 120 MMHG | BODY MASS INDEX: 21.08 KG/M2 | RESPIRATION RATE: 16 BRPM | HEIGHT: 72 IN | TEMPERATURE: 98.5 F | OXYGEN SATURATION: 97 % | WEIGHT: 155.6 LBS | DIASTOLIC BLOOD PRESSURE: 60 MMHG

## 2022-07-22 DIAGNOSIS — Q52.4 VAGINAL ANOMALY: ICD-10-CM

## 2022-07-22 DIAGNOSIS — R10.31 RIGHT LOWER QUADRANT PAIN: ICD-10-CM

## 2022-07-22 DIAGNOSIS — R41.89 BRAIN FOG: ICD-10-CM

## 2022-07-22 PROCEDURE — 99214 OFFICE O/P EST MOD 30 MIN: CPT | Performed by: INTERNAL MEDICINE

## 2022-07-22 ASSESSMENT — FIBROSIS 4 INDEX: FIB4 SCORE: 1.83

## 2022-07-23 NOTE — PROGRESS NOTES
CC: Jana Overton is a 66 y.o. female is suffering from   Chief Complaint   Patient presents with   • Lab Results   • RLQ Pain     Possible referral or abx, would like to discuss options, occurs when Crohn's disease flares up           SUBJECTIVE:  1. Brain fog  Patient and I have discussed that she is having some problems of brain fog would like to follow-up with a psychologist, referral written    2. Right lower quadrant pain  Patient with a history of colostomy, is having problems with pain associate with the previous colostomy site suspect underlying adhesions    3. Vaginal anomaly  Patient with excessive vaginal discharge is concerned about possible problems.         Past social, family, history: .   Social History     Tobacco Use   • Smoking status: Never Smoker   • Smokeless tobacco: Never Used   • Tobacco comment: second hand smoke parents - smoked for only 1 year many years ago   Vaping Use   • Vaping Use: Never used   Substance Use Topics   • Alcohol use: Not Currently     Alcohol/week: 0.6 oz     Types: 1 Shots of liquor per week     Comment: gin and half a lime; tonic water   • Drug use: Not Currently     Types: Marijuana, Inhaled     Comment: medical marijuana through bong         MEDICATIONS:    Current Outpatient Medications:   •  amoxicillin-clavulanate (AUGMENTIN) 875-125 MG Tab, TAKE ONE TABLET BY MOUTH TWICE A DAY, Disp: 14 Tablet, Rfl: 0  •  naratriptan (AMERGE) 2.5 MG tablet, Take 1 Tablet by mouth as needed for Migraine., Disp: 5 Tablet, Rfl: 3  •  hydrALAZINE (APRESOLINE) 10 MG Tab, Take 1 Tablet by mouth 3 times a day as needed (systolic blood pressure >150.)., Disp: 270 Tablet, Rfl: 3  •  furosemide (LASIX) 20 MG Tab, Take 1 Tablet by mouth 1 time a day as needed (leg swelling/weight gain)., Disp: 90 Tablet, Rfl: 3  •  potassium chloride SA (KDUR) 20 MEQ Tab CR, Take 1 Tablet by mouth 1 time a day as needed (when you take lasix.)., Disp: 90 Tablet, Rfl: 3  •  ondansetron (ZOFRAN  ODT) 4 MG TABLET DISPERSIBLE, Take 1 Tablet by mouth every 6 hours as needed for Nausea., Disp: 20 Tablet, Rfl: 0  •  metoprolol tartrate (LOPRESSOR) 50 MG Tab, TAKE 1 TABLET BY MOUTH TWICE DAILY, HOLD IF BLOOD PRESSURE LESS THAN 95/50 (Patient taking differently: Take 50 mg by mouth 2 times a day. TAKE 1 TABLET BY MOUTH TWICE DAILY, HOLD IF BLOOD PRESSURE LESS THAN 95/50), Disp: 180 Tablet, Rfl: 0  •  prochlorperazine (COMPAZINE) 10 MG Tab, Take 1 Tablet by mouth 1 time a day as needed. (Patient taking differently: Take 10 mg by mouth 1 time a day as needed for Nausea/Vomiting.), Disp: 10 Tablet, Rfl: 0  •  oxyCODONE immediate release (ROXICODONE) 10 MG immediate release tablet, Take 10 mg by mouth every four hours as needed for Severe Pain. 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) 06/01/2019   • Essential hypertension 05/20/2019   • SIRS (systemic inflammatory response syndrome) ([facility]) 05/19/2019   • High anion gap metabolic acidosis 05/19/2019   • DVT (deep venous thrombosis) ([facility]) 12/31/2018   • History of MRSA infection 12/29/2018   • History of infection with vancomycin resistant Enterococcus (VRE) 12/29/2018   • 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[facility]) 12/28/2018   • Dysphasia 12/28/2018   • Anemia 12/15/2018   • Thrush of mouth and esophagus ([facility]) 12/13/2018   • Hypokalemia 12/13/2018   • Liver enzyme elevation 12/12/2018   • Leukocytosis 12/12/2018   • Chronic respiratory failure with hypoxia ([facility]) 03/20/2018   •  Anxiety disorder due to multiple medical problems 12/01/2017   • DDD (degenerative disc disease), lumbar 04/12/2017   • History of DVT (deep vein thrombosis) 11/23/2016   • Paroxysmal atrial fibrillation ([facility]) 03/26/2015   • Sleep apnea 10/26/2014   • Postherpetic neuralgia 06/24/2014   • Multiple falls 06/24/2014   • COPD (chronic obstructive pulmonary disease) ([facility]) 06/24/2014   • Rosacea 06/24/2014   • Ileostomy in place ([facility]) 06/26/2013   • GERD (gastroesophageal reflux disease) 12/05/2012   • Status post lumbar surgery 07/16/2012   • Crohn's disease of small intestine with complication ([facility]) 09/23/2009   • Chronic pain 09/23/2009   • Opioid type dependence, continuous (CMS-HCC) 09/23/2009       REVIEW OF SYSTEMS:  Gen.:  No Nausea, Vomiting, fever, Chills.  Heart: No chest pain.  Lungs:  No shortness of Breath.  Psychological: Rg unusual Anxiety depression     PHYSICAL EXAM   Constitutional: Alert, cooperative, not in acute distress.  Cardiovascular:  Rate Rhythm is regular without murmurs rubs clicks.     Thorax & Lungs: Clear to auscultation, no wheezing, rhonchi, or rales  HENT: Normocephalic, Atraumatic.  Eyes: PERRLA, EOMI, Conjunctiva normal.   Neck: Trachia is midline no swelling of the thyroid.   Lymphatic: No lymphadenopathy noted.   Abdomin: Pain to palpation right lower quadrant previous site of the colostomy consistent with probable surgical adhesion  Skin: Warm, Dry, No erythema, No rash.   Extremities: Atraumatic with symmetric distal pulses, No edema, No tenderness, No cyanosis, No clubbing.   Neurologic: Alert & oriented x 3, cranial nerves II through XII are intact, Normal motor function, Normal sensory function, No focal deficits noted.   Psychiatric: Affect normal, Judgment normal, Mood normal.     VITAL SIGNS:/60 (BP Location: Left arm, Patient Position: Sitting, BP Cuff Size: Adult)   Pulse 80   Temp 36.9 °C (98.5 °F)   Resp 16   Ht 1.829 m (6')   Wt 70.6 kg (155 lb 9.6 oz)    SpO2 97%   BMI 21.10 kg/m²     Labs: Reviewed    Assessment:                                                     Plan:    1. Brain fog  Referral written to psychology  - Referral to Psychology    2. Right lower quadrant pain  Referral written to general surgery  - Referral to General Surgery    3. Vaginal anomaly  Vaginal discharge referral written.   - Referral to OB/Gyn

## 2022-07-27 ENCOUNTER — OFFICE VISIT (OUTPATIENT)
Dept: RHEUMATOLOGY | Facility: MEDICAL CENTER | Age: 67
End: 2022-07-27
Payer: MEDICARE

## 2022-07-27 VITALS
RESPIRATION RATE: 16 BRPM | DIASTOLIC BLOOD PRESSURE: 60 MMHG | WEIGHT: 157 LBS | HEIGHT: 72 IN | SYSTOLIC BLOOD PRESSURE: 130 MMHG | OXYGEN SATURATION: 95 % | HEART RATE: 82 BPM | BODY MASS INDEX: 21.26 KG/M2 | TEMPERATURE: 98.5 F

## 2022-07-27 DIAGNOSIS — R76.8 POSITIVE ANA (ANTINUCLEAR ANTIBODY): ICD-10-CM

## 2022-07-27 DIAGNOSIS — M19.90 INFLAMMATORY ARTHRITIS: ICD-10-CM

## 2022-07-27 DIAGNOSIS — G89.29 CENTRAL SENSITIZATION TO PAIN: ICD-10-CM

## 2022-07-27 DIAGNOSIS — R76.0 POSITIVE CARDIOLIPIN ANTIBODIES: ICD-10-CM

## 2022-07-27 PROBLEM — F12.10 MILD TETRAHYDROCANNABINOL (THC) ABUSE: Status: ACTIVE | Noted: 2022-06-02

## 2022-07-27 PROCEDURE — 99204 OFFICE O/P NEW MOD 45 MIN: CPT | Performed by: STUDENT IN AN ORGANIZED HEALTH CARE EDUCATION/TRAINING PROGRAM

## 2022-07-27 RX ORDER — TRAZODONE HYDROCHLORIDE 50 MG/1
50 TABLET ORAL EVERY EVENING
COMMUNITY
Start: 2022-07-26 | End: 2022-11-07

## 2022-07-27 ASSESSMENT — FIBROSIS 4 INDEX: FIB4 SCORE: 1.83

## 2022-07-27 NOTE — PATIENT INSTRUCTIONS
AFTER VISIT INSTRUCTIONS    Below are important information to help you navigate your healthcare needs and help us serve you safely and effectively:  If laboratory tests and/or imaging studies were ordered, remember to go get them done.  If new prescriptions or refills were sent to the pharmacy, remember to go pick them up.  Take your medications exactly as prescribed unless instructed otherwise.  If there are significant findings on your lab tests and imaging studies that warrant further action, I will notify you with explanations via [employer] or phone call, otherwise you can view them on [employer] and let me know if you have any questions.  Sign up for [employer] if you have not already done so, in order to have access to the results of your lab tests and imaging studies, and to be able to send and receive messages from me.  Note that [employer] messages are typically read during office hours only and may take 1-7 days before a response depending on the urgency of the situation and how busy my schedule is.  In general, [employer] messaging is for non-urgent matters that do not require immediate attention, so for urgent matters that cannot wait, you are encouraged to go to an urgent care.

## 2022-07-27 NOTE — PROGRESS NOTES
[facility]  [address] [place] [address], [address], [place] [address]  Phone: [phone] / Fax: [phone]    RHEUMATOLOGY NEW PATIENT VISIT NOTE      DATE OF SERVICE: 07/27/2022    REFERRING PROVIDER:  Chase Charles D.O.  [address] [facility] [address]  [place] [address]      SUBJECTIVE:     CHIEF COMPLAINT:   Chief Complaint   Patient presents with   • New Patient     Evaluation for Positive VAHID labs       HISTORY OF PRESENT ILLNESS:  Jana Overton is a 66 y.o. female with pertinent history notable for Crohn's disease diagnosed in late 1990s/early 2000s, DJD/DDD of cervical and lumbar spines s/p cervical and lumbar fusions, fibromyalgia/chronic pain syndrome since the 1990s (under the care of pain management), and multiple comorbidities. Presents for evaluation for lupus in the setting of positive VAHID, reports widespread joint/muscle pain involving her hands, elbows, shoulders, neck, upper/mid/lower back, knees, ankles, and feet feet. These are associated with all day joint stiffness that improves with activity but her joint/muscle pain tends to worsen with much activity such that she feels exhausted. Reports chronic fatigue with muscle weakness, photosensitive rash on face/extremities, Raynaud's phenomenon of fingers/toes, dry eyes with redness/pain, dry mouth with episodic ulcers, and numerous nonspecific symptoms from multiple organ systems. Notes that she has been trying to manage with natural/alternative medicines to avoid needing to take immunosuppressive medications.    Pertinent treatment history as of 7/2022: Remicade for Crohn's disease (last infusion in early 2000s), Neurontin and Lyrica (neither of which was very helpful).  Pertinent lab results as of 6-7/2022: Positive IgA anti-CL 24 and IgG anti-CL 19 with negative IgM anti-CL (in 10/2021), positive VAHID 1:640 homogenous pattern with negative reflex panel, elevated ESR 27 and CRP 0.84; negative/normal ANCA (in  10/2021), RF, CPK, TSH, vitamin D, LFT, unremarkable CBC, and QTB.    REVIEW OF SYSTEMS:  Except as noted in the history above, a complete review of systems with emphasis on autoimmune inflammatory conditions was otherwise negative for any significant symptoms.      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   • Opioid type dependence, continuous (CMS-[facility])   • Degenerative joint disease of cervical and lumbar spine   • Status post lumbar surgery   • GERD (gastroesophageal reflux disease)   • Ileostomy in place ([facility])   • Postherpetic neuralgia   • Multiple falls   • COPD (chronic obstructive pulmonary disease) ([facility])   • Rosacea   • Sleep apnea   • Paroxysmal atrial fibrillation ([facility])   • History of DVT (deep vein thrombosis)   • DDD (degenerative disc disease), lumbar   • Anxiety disorder due to multiple medical problems   • Chronic respiratory failure with hypoxia ([facility])   • Liver enzyme elevation   • Leukocytosis   • Thrush of mouth and esophagus ([facility])   • Hypokalemia   • Anemia   • 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[facility])   • Dysphasia   • History of MRSA infection   • History of infection with vancomycin resistant Enterococcus (VRE)   • DVT (deep venous thrombosis) ([facility])   • SIRS (systemic inflammatory response syndrome) ([facility])   • High anion gap metabolic acidosis   • Essential hypertension   • FLORES (acute kidney injury) ([facility])   • Migraine   • Acute cystitis without hematuria   • Ureteric stone   • 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      PAST MEDICAL HISTORY:  Past  Medical History:   Diagnosis Date   • Anesthesia     difficult Iv stick   • Anxiety    • Arthritis     all over   • ASTHMA    • Atrial fib/flut    • Backpain    • Bronchitis     5 years   • Chronic pain    • Colostomy in place ([facility]) 10/14/2010   • COPD (chronic obstructive pulmonary disease) ([facility]) 6/24/2014   • COPD (chronic obstructive pulmonary disease) ([facility]) 6/24/2014   • Crohn's disease of colon ([facility])    • Dyspnea    • History of cardiac murmur    • Hypokalemia 6/24/2014   • Ileostomy present ([facility])    • Infectious disease     MRSA, VancoRSA   • Multiple falls 6/24/2014   • Narcotic dependence ([facility]) 9/23/2009   • Obstruction    • Other specified disorder of intestines     crohns disease   • Pain 7/11/13    all over   • Pneumonia     child and mid 30's   • Postherpetic neuralgia 6/24/2014   • Rosacea 6/24/2014   • Sleep apnea        PAST SURGICAL HISTORY:  Past Surgical History:   Procedure Laterality Date   • OK CYSTOSCOPY,INSERT URETERAL STENT Right 12/23/2021    Procedure: CYSTOSCOPY, WITH URETERAL STENT EXCHANGE;  Surgeon: Jonathan Turcios M.D.;  Location: [facility];  Service: Urology   • OK CYSTO/URETERO/PYELOSCOPY, DX Right 12/23/2021    Procedure: URETEROSCOPY;  Surgeon: Jonathan Turcios M.D.;  Location: [facility];  Service: Urology   • OK CYSTO/URETERO/PYELOSCOPY, DX Right 12/8/2021    Procedure: URETEROSCOPY;  Surgeon: Luc Hook M.D.;  Location: [facility];  Service: Urology   • CYSTO STENT PLACEMNT PRE SURG Right 12/8/2021    Procedure: CYSTOSCOPY, WITH URETERAL STENT INSERTION;  Surgeon: Luc Hook M.D.;  Location: [facility];  Service: Urology   • ILEOSTOMY  1/20/2021    Procedure: ILEOSTOMY- COMPLEX REVISION,;  Surgeon: Kevin Cruz M.D.;  Location: [facility];  Service: General   • LYSIS ADHESIONS GENERAL  1/20/2021    Procedure: LYSIS, ADHESIONS;  Surgeon: Kevin Cruz M.D.;  Location: [facility];  Service: General   •  GASTROSCOPY N/A 5/22/2019    Procedure: GASTROSCOPY - WITH DILATION;  Surgeon: Dionicio Cardona M.D.;  Location: SUR[facility]ERY [facility];  Service: Gastroenterology   • GASTROSCOPY  1/6/2019    Procedure: GASTROSCOPY- WITH DILATION;  Surgeon: Daniel Daniels M.D.;  Location: [facility];  Service: Gastroenterology   • ILEOSTOMY  12/29/2018    Procedure: ILEOSTOMY- REVISION;  Surgeon: Kevin Cruz M.D.;  Location: SURGERY [facility];  Service: General   • SIGMOIDOSCOPY FLEX  12/29/2018    Procedure: SIGMOIDOSCOPY FLEX;  Surgeon: Kevin Cruz M.D.;  Location: [facility];  Service: General   • EXPLORATORY LAPAROTOMY  12/29/2018    Procedure: EXPLORATORY LAPAROTOMY, lysis of adhesions;  Surgeon: Kevin Cruz M.D.;  Location: [facility];  Service: General   • ILEOSTOMY  5/14/2014    Performed by Kevin Cruz M.D. at SURGERY [facility]   • MAMMOPLASTY REDUCTION  7/17/2013    Performed by Janey Burt M.D. at [facility]   • HARDWARE REMOVAL NEURO  7/16/2012    Performed by KEELEY KIM at [facility]   • GASTROSCOPY  10/4/2011    Performed by TYSON MARTINEZ at SURGERY SAME DAY [facility] ORS   • COLONOSCOPY  10/4/2011    Performed by TYSON MARTINEZ at SURGERY SAME DAY [facility] ORS   • DILATION AND CURETTAGE  9/24/2010    Performed by GAURAV ALY at SURGERY SAME DAY [facility] ORS   • ILEOSTOMY  11/11/2009    Performed by KEVIN CRUZ at SURGERY [facility]   • GASTROSCOPY WITH BIOPSY  11/22/08    Performed by NEGRITA HUYNH at [facility]   • ABDOMINAL EXPLORATION     • CERVICAL DISK AND FUSION ANTERIOR     • COLON RESECTION     • FOOT SURGERY     • HAND SURGERY     • LUMBAR FUSION ANTERIOR     • LUMPECTOMY     • OTHER ABDOMINAL SURGERY      surgery for chrons disease   • [place] BREAST REDUCTION         MEDICATIONS:  Current Outpatient Medications   Medication Sig Dispense Refill   • traZODone (DESYREL)  50 MG Tab      • naratriptan (AMERGE) 2.5 MG tablet Take 1 Tablet by mouth as needed for Migraine. 5 Tablet 3   • hydrALAZINE (APRESOLINE) 10 MG Tab Take 1 Tablet by mouth 3 times a day as needed (systolic blood pressure >150.). 270 Tablet 3   • furosemide (LASIX) 20 MG Tab Take 1 Tablet by mouth 1 time a day as needed (leg swelling/weight gain). 90 Tablet 3   • potassium chloride SA (KDUR) 20 MEQ Tab CR Take 1 Tablet by mouth 1 time a day as needed (when you take lasix.). 90 Tablet 3   • ondansetron (ZOFRAN ODT) 4 MG TABLET DISPERSIBLE Take 1 Tablet by mouth every 6 hours as needed for Nausea. 20 Tablet 0   • metoprolol tartrate (LOPRESSOR) 50 MG Tab TAKE 1 TABLET BY MOUTH TWICE DAILY, HOLD IF BLOOD PRESSURE LESS THAN 95/50 (Patient taking differently: Take 50 mg by mouth 2 times a day. TAKE 1 TABLET BY MOUTH TWICE DAILY, HOLD IF BLOOD PRESSURE LESS THAN 95/50) 180 Tablet 0   • prochlorperazine (COMPAZINE) 10 MG Tab Take 1 Tablet by mouth 1 time a day as needed. (Patient taking differently: Take 10 mg by mouth 1 time a day as needed for Nausea/Vomiting.) 10 Tablet 0   • oxyCODONE immediate release (ROXICODONE) 10 MG immediate release tablet Take 10 mg by mouth every four hours as needed for Severe Pain. Indications: Chronic Pain     • Probiotic Product (PROBIOTIC PO) Take 2 Capsules by mouth every day.       No current facility-administered medications for this visit.       ALLERGIES:   Allergies   Allergen Reactions   • Azathioprine Sodium Hives and Shortness of Breath   • Infliximab Hives, Shortness of Breath and Rash     .   • Methotrexate Hives, Shortness of Breath and Rash     .   • Methotrexate Hives, Rash and Shortness of Breath     .   • Morphine Shortness of Breath, Swelling and Palpitations   • Promethazine Shortness of Breath and Rash     .   • Roxanol [Morphine Sulfate] Swelling     Throat swells   • Amitriptyline Unspecified     Nightmares     • Carafate [Sucralfate] Vomiting and Nausea      "Generalizes/Mouth Sores   • Demerol Vomiting   • Fentanyl Unspecified     Patches caused skin breakdown, no pain relief   • Fentanyl      \"Patches didn't work\"  Skin broke down   • Lorazepam Unspecified     States give me nightmares.    • Meperidine Vomiting   • Methadone Unspecified     Didn't work   • Other Drug Unspecified     steroids   • Pregabalin Rash     Rash     • Pseudoephedrine Vomiting   • Sulfa Drugs Hives and Rash     .  .   • Ketorolac Tromethamine    • Betamethasone Unspecified     Pt unable to remember   • Celestone [Betamethasone Sodium Phosphate] Unspecified     Pt unable to remember   • Sulfasalazine Rash     On chest       IMMUNIZATIONS:  Immunization History   Administered Date(s) Administered   • INFLUENZA TIV (IM) 09/17/2007, 10/06/2012, 11/16/2013, 09/27/2015   • Influenza (IM) Preservative Free - HISTORICAL DATA 12/17/2010, 09/27/2015   • Influenza Seasonal Injectable - Historical Data 10/06/2012, 11/16/2013   • Influenza Vac Subunit Quad Inj (Pf) 10/10/2017   • Influenza Vaccine Adult HD 11/12/2018, 09/23/2020, 10/11/2021   • Influenza Vaccine Quad Inj (Pf) 10/24/2014, 10/04/2017   • Influenza Vaccine Quad Inj (Preserved) 09/21/2016, 10/11/2019   • Influenza Vaccine-Flucelvax - HISTORICAL DATA 01/06/2014   • PFIZER DELATORRE CAP SARS-COV-2 VACCINATION (12+) 05/12/2022   • PFIZER PURPLE CAP SARS-COV-2 VACCINATION (12+) 03/17/2021, 04/08/2021, 10/29/2021   • Pneumococcal Conjugate Vaccine (Prevnar/PCV-13) 11/12/2018   • Pneumococcal Vaccine (UF) - HISTORICAL DATA 10/17/2007   • Pneumococcal polysaccharide vaccine (PPSV-23) 10/06/2012   • Tdap Vaccine 12/04/2012, 11/16/2013   • ZZZInfluenza Vaccine Pediatric Preserv Free 09/29/2009   • Zoster Vaccine Live (ZVL) (Zostavax) - HISTORICAL DATA 11/01/2014       SOCIAL HISTORY:   Social History     Tobacco Use   • Smoking status: Never Smoker   • Smokeless tobacco: Never Used   • Tobacco comment: second hand smoke parents - smoked for only 1 year many " years ago   Vaping Use   • Vaping Use: Never used   Substance Use Topics   • Alcohol use: Not Currently     Alcohol/week: 0.6 oz     Types: 1 Shots of liquor per week     Comment: gin and half a lime; tonic water   • Drug use: Not Currently     Types: Marijuana, Inhaled     Comment: medical marijuana through bong       FAMILY HISTORY:  Family History   Problem Relation Age of Onset   • Lung Disease Mother         copd   • Diabetes Father    • Heart Disease Father    • Diabetes Sister    • Cancer Maternal Aunt 40        breast        OBJECTIVE:     Vital Signs: /60 (BP Location: Left arm, Patient Position: Sitting, BP Cuff Size: Adult)   Pulse 82   Temp 36.9 °C (98.5 °F) (Temporal)   Resp 16   Ht 1.829 m (6')   Wt 71.2 kg (157 lb)   SpO2 95% Body mass index is 21.29 kg/m².    General: Appears well and comfortable  Eyes: No scleral or conjunctival lesions  ENT: No apparent oral or nasal lesions  Head/Neck: No apparent scalp or neck lesions  Cardiovascular: Regular rate and rhythm; no pericardial rubs  Respiratory: Breathing quiet and unlabored; no rales or pleural rubs  Gastrointestinal: No organomegaly or abdominal masses  Integumentary: Malar rash on face; multiple tiny erythematous papules on upper and lower extremities  Musculoskeletal: Poorly localized mild-to-moderate tenderness on hands (with mild prominence of MCP and PIP joints), elbows, shoulders, knees, ankles, feet, and large muscle groups of the upper/lower extremities, chest wall, neck, and upper/mid/lower back; overall range of motion limited by pain  Neurologic: No focal sensory or motor deficits  Psychiatric: Mood and affect appropriate      LABORATORY RESULTS REVIEWED AND INTERPRETED BY ME:  Lab Results   Component Value Date/Time    SEDRATEWES 27 (H) 06/06/2022 08:45 AM    CREACTPROT 0.84 (H) 06/06/2022 08:45 AM    URICACID 8.1 10/03/2011 11:08 AM     Lab Results   Component Value Date/Time    RHEUMFACTN 11 06/06/2022 08:45 AM     Lab  Results   Component Value Date/Time    ANTINUCAB Detected (A) 06/06/2022 08:45 AM    ANADIRECT Negative 10/20/2010 11:00 AM    ANAPATTERN <1:40 01/07/2009 06:15 PM     Lab Results   Component Value Date/Time    ANTIDNADS 18 06/06/2022 08:45 AM    SMITHAB 1 06/06/2022 08:45 AM    RNPAB 4 06/06/2022 08:45 AM    YASLMZL90 0 06/06/2022 08:45 AM    SSA60 0 06/06/2022 08:45 AM    SSA52 0 06/06/2022 08:45 AM    ANTISSBSJ 0 06/06/2022 08:45 AM    JO1AB 3 06/06/2022 08:45 AM     Lab Results   Component Value Date/Time    IGACARDIOLI 24 (H) 10/26/2021 07:49 AM    IGMCARDIOLI <10 10/26/2021 07:49 AM    IGGCARDIOLI 19 (H) 10/26/2021 07:49 AM    PROTHROMBTM 14.3 05/23/2022 02:36 AM    INR 1.20 (H) 05/23/2022 02:36 AM     Lab Results   Component Value Date/Time     (H) 11/01/2021 07:53 AM    IGG 1169 11/01/2021 07:53 AM     Lab Results   Component Value Date/Time    ANTIMITOCHO 3.5 07/22/2016 12:41 PM    FACTIN 11 07/22/2016 12:41 PM     Lab Results   Component Value Date/Time    TSH 0.922 10/20/2010 11:00 AM    TSHULTRASEN 0.630 07/14/2022 11:23 AM    FREEDIR 1.28 10/20/2010 11:00 AM    FREET4 1.27 07/14/2022 11:23 AM     Lab Results   Component Value Date/Time    HEPBSAG Negative 07/22/2016 12:41 PM    HEPBCORIGM Negative 07/22/2016 12:41 PM    HEPCAB Negative 07/22/2016 12:41 PM     Lab Results   Component Value Date/Time    CPKTOTAL 41 05/05/2022 09:43 AM    ALDOLASE 4.2 01/21/2008 03:43 AM    MYOGLOBIN 73 07/17/2008 03:48 PM     Lab Results   Component Value Date/Time    ASTSGOT 27 06/16/2022 07:07 PM    ALTSGPT 23 06/16/2022 07:07 PM    ALKPHOSPHAT 93 06/16/2022 07:07 PM    TBILIRUBIN 0.5 06/16/2022 07:07 PM    TOTPROTEIN 7.6 06/16/2022 07:07 PM    TOTPROTEIN 7.1 11/01/2021 07:53 AM    ALBUMIN 4.3 06/16/2022 07:07 PM    ALBUMIN 3.83 11/01/2021 07:53 AM     Lab Results   Component Value Date/Time    SODIUM 136 07/14/2022 11:27 AM    POTASSIUM 3.5 (L) 07/14/2022 11:27 AM    CHLORIDE 101 07/14/2022 11:27 AM    CO2 23  07/14/2022 11:27 AM    GLUCOSE 84 07/14/2022 11:27 AM    BUN 24 (H) 07/14/2022 11:27 AM    CREATININE 1.19 07/14/2022 11:27 AM    CREATININE 0.89 02/10/2010 04:04 PM    BUNCREATRAT 17 02/10/2010 04:04 PM    GLOMRATE >59 02/10/2010 04:04 PM    CALCIUM 9.5 07/14/2022 11:27 AM    MAGNESIUM 1.7 06/16/2022 07:07 PM     Lab Results   Component Value Date/Time    WBC 7.4 07/14/2022 11:27 AM    WBC 7.9 02/10/2010 04:04 PM    RBC 4.46 07/14/2022 11:27 AM    RBC 4.86 02/10/2010 04:04 PM    HEMOGLOBIN 13.7 07/14/2022 11:27 AM    HEMATOCRIT 41.8 07/14/2022 11:27 AM    MCV 93.7 07/14/2022 11:27 AM    MCV 86 02/10/2010 04:04 PM    MCH 30.7 07/14/2022 11:27 AM    MCH 27.8 02/10/2010 04:04 PM    MCHC 32.8 (L) 07/14/2022 11:27 AM    RDW 43.9 07/14/2022 11:27 AM    RDW 14.6 02/10/2010 04:04 PM    PLATELETCT 209 07/14/2022 11:27 AM    MPV 11.4 07/14/2022 11:27 AM    NEUTS 5.94 06/16/2022 07:07 PM    NEUTS 4.8 02/10/2010 04:04 PM    POLYS 53 01/28/2021 02:40 PM    LYMPHOCYTES 23.80 06/16/2022 07:07 PM    MONOCYTES 6.60 06/16/2022 07:07 PM    EOSINOPHILS 1.70 06/16/2022 07:07 PM    EOSINOPHILS 34 01/28/2021 02:40 PM    BASOPHILS 0.80 06/16/2022 07:07 PM    HYPOCHROMIA 1+ 03/15/2014 10:02 AM     Lab Results   Component Value Date/Time    FERRITIN 144.0 05/07/2020 01:34 PM    IRON 144 05/07/2020 01:34 PM    FOLATE 16.4 04/28/2021 07:59 AM     Lab Results   Component Value Date/Time    25HYDROXY 40 07/14/2022 11:23 AM     Lab Results   Component Value Date/Time    COLORURINE Yellow 07/14/2022 11:27 AM    SPECGRAVITY 1.009 07/14/2022 11:27 AM    PHURINE 6.0 07/14/2022 11:27 AM    GLUCOSEUR Negative 07/14/2022 11:27 AM    KETONES Negative 07/14/2022 11:27 AM    PROTEINURIN Negative 07/14/2022 11:27 AM     Lab Results   Component Value Date/Time    TOTALVOLUME 500 10/14/2021 05:00 AM    TOTPROTUR 6 10/14/2021 05:00 AM    OHPIYRFE06 30 (L) 10/14/2021 05:00 AM     Lab Results   Component Value Date/Time    FLTYPE Peritoneal 01/28/2021 02:40 PM      Lab Results   Component Value Date/Time    CHOLSTRLTOT 194 05/23/2022 02:36 AM    LDL 90 05/23/2022 02:36 AM    HDL 81 05/23/2022 02:36 AM    TRIGLYCERIDE 113 05/23/2022 02:36 AM    HBA1C 5.3 08/27/2015 01:50 PM       RADIOLOGY RESULTS REVIEWED AND INTERPRETED BY ME:  Results for orders placed during the hospital encounter of 09/06/12    DX-CERVICAL SPINE-2 OR 3 VIEWS    Impression  1. Anterior plate and screw fixation at the C5-6 level.  2. Degenerative disk disease at the C4-5 level.    Results for orders placed in visit on 09/24/15    DS-BONE DENSITY STUDY (DEXA)    Impression  According to the World Health Organization classification, bone mineral density of this patient is normal for the lumbar spine and osteopenic for the left femur. Increase in bone density of the lumbar spine since the most recent exam is not statistically significant. Decrease in bone density in the left femur since the most recent exam is statistically significant.    10-year Probability of Fracture:  Major Osteoporotic     15.0%  Hip     0.5%  Population      [place] ()    Based on left femur neck BMD    Results for orders placed during the hospital encounter of 09/06/12    MR-CERVICAL SPINE-W/O    Impression  1. There are degenerative and post surgical changes in the cervical spine as described above.  2. There are combination of mild disk bulge and ligamentum flavum hypertrophy at C6-7 causing mild central canal stenosis.  There has been no significant interval change.      All relevant laboratory and imaging results reported on this note were reviewed and interpreted by me.      ASSESSMENT AND PLAN:     Jana Overton is a 66 y.o. female with history as noted above whose presentation merits the following diagnostic and clinical status impressions and recommendations:    1. Positive VAHID (1:640 homogenous pattern with negative reflex)  Clinical picture is suggestive of systemic or drug-induced lupus with serologic evidence of  significant immunologic activity. However, important to note that the VAHID test is highly sensitive and lacks diagnostic specificity as it can be seen in a variety of non-rheumatic conditions, such as acute or chronic bacterial or viral infections, autoimmune thyroid disease (Hashimoto's thyroiditis or Graves' disease), autoimmune hepatobiliary disease, inflammatory bowel disease, and lymphoproliferative disease or malignancy, as well as in over 10% of healthy individuals with no clinical evidence of autoimmune rheumatic disease. In any case, it must be interpreted in the context of the overall clinical picture with close attention to disease-specific manifestations. That said, the presence of persistently positive VAHID, especially in high or rising titers, does confer some risk of VAHID-associated disease. In this case, need to undertake the additional workup noted below for confirmatory, exclusionary, and risk stratification purposes. Based on the results, additional recommendations will be provided which would likely include initiation of immunomodulatory therapy with hydroxychloroquine.  - ANTI-HISTONE ABS  - CHROMATIN AB,IGG  - C3+C4+COMPT  - THYROID PEROXIDASE  (TPO) AB  - ANTITHYROGLOBULIN AB    2. Inflammatory arthritis  Presentation is compatible with inflammatory arthritis with differentials including lupus-spectrum arthritis, undifferentiated spondylarthritis, and rheumatoid arthritis. In any case, it is reasonable to undertake further investigation with the workup noted below for diagnostic clarification and risk stratification.  - CCP ANTIBODY  - HLA-B27  - DX-JOINT SURVEY-HANDS SINGLE VIEW    3. 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 The etiology is thought to be due to perturbations in the neuroendocrine or pain pathways of the central nervous system  typically in individuals with predisposing conditions rooted in the central nervous system. This most commonly occurs in individuals with anxiety and depression, but can also be triggered by a major emotional and/or physical trauma, serious ailment requiring significant medical and/or surgical intervention, and perpetuated by obstructive sleep apnea, especially in the setting of obesity. This condition is often a diagnosis of exclusion, but may occur in the setting of an underlying chronic systemic disease, particularly a hormonal imbalance, which may alter the neuroendocrine pain pathways. In general, management of this condition typically entails engagement in low intensity low impact aerobic exercise and treatment with a neuromodulator.  - Having previously tried Neurontin and Lyrica, consider trial of Cymbalta or Savella  - Follow up with pain management as usual    4. Positive cardiolipin antibodies (IgA and IgG anti-CL)  No historical or physical evidence of hypercoagulability to suggest the presence of underlying antiphospholipid syndrome based on the diagnostic criteria.  - No further APS-related immunologic lab testing necessary at this time      FOLLOW-UP: Return in about 2 months (around 9/27/2022) for Short.           Thank you for giving me the opportunity to participate in the care of Jana Overton.    Petar Lewis MD, MS  Rheumatologist, [facility]  , Department of Internal Medicine  [facility]

## 2022-07-28 ENCOUNTER — HOSPITAL ENCOUNTER (OUTPATIENT)
Dept: LAB | Facility: MEDICAL CENTER | Age: 67
End: 2022-07-28
Attending: STUDENT IN AN ORGANIZED HEALTH CARE EDUCATION/TRAINING PROGRAM
Payer: MEDICARE

## 2022-07-28 ENCOUNTER — HOSPITAL ENCOUNTER (OUTPATIENT)
Dept: RADIOLOGY | Facility: MEDICAL CENTER | Age: 67
End: 2022-07-28
Attending: STUDENT IN AN ORGANIZED HEALTH CARE EDUCATION/TRAINING PROGRAM
Payer: MEDICARE

## 2022-07-28 PROCEDURE — 83516 IMMUNOASSAY NONANTIBODY: CPT | Mod: 91

## 2022-07-28 PROCEDURE — 86160 COMPLEMENT ANTIGEN: CPT | Mod: 91

## 2022-07-28 PROCEDURE — 86376 MICROSOMAL ANTIBODY EACH: CPT

## 2022-07-28 PROCEDURE — 36415 COLL VENOUS BLD VENIPUNCTURE: CPT

## 2022-07-28 PROCEDURE — 77077 JOINT SURVEY SINGLE VIEW: CPT

## 2022-07-28 PROCEDURE — 86800 THYROGLOBULIN ANTIBODY: CPT

## 2022-07-28 PROCEDURE — 86200 CCP ANTIBODY: CPT

## 2022-07-28 PROCEDURE — 86812 HLA TYPING A B OR C: CPT | Mod: GA

## 2022-07-29 LAB
C3 SERPL-MCNC: 142.2 MG/DL (ref 87–200)
C4 SERPL-MCNC: 46.9 MG/DL (ref 19–52)
THYROPEROXIDASE AB SERPL-ACNC: <9 IU/ML (ref 0–9)

## 2022-07-30 LAB
CCP IGG SERPL-ACNC: 8 UNITS (ref 0–19)
THYROGLOB AB SERPL-ACNC: <0.9 IU/ML (ref 0–4)

## 2022-07-31 LAB — HLA-B27 QL FC: NEGATIVE

## 2022-08-01 LAB — HISTONE IGG SER IA-ACNC: 2.9 UNITS (ref 0–0.9)

## 2022-08-03 LAB — CHROMATIN IGG SERPL-ACNC: 24 UNITS (ref 0–19)

## 2022-08-05 ENCOUNTER — APPOINTMENT (OUTPATIENT)
Dept: RADIOLOGY | Facility: MEDICAL CENTER | Age: 67
End: 2022-08-05
Attending: EMERGENCY MEDICINE
Payer: MEDICARE

## 2022-08-05 ENCOUNTER — HOSPITAL ENCOUNTER (EMERGENCY)
Facility: MEDICAL CENTER | Age: 67
End: 2022-08-06
Attending: EMERGENCY MEDICINE
Payer: MEDICARE

## 2022-08-05 DIAGNOSIS — N17.9 AKI (ACUTE KIDNEY INJURY) (HCC): ICD-10-CM

## 2022-08-05 DIAGNOSIS — R10.84 GENERALIZED ABDOMINAL PAIN: ICD-10-CM

## 2022-08-05 LAB
ALBUMIN SERPL BCP-MCNC: 4.6 G/DL (ref 3.2–4.9)
ALBUMIN/GLOB SERPL: 1.3 G/DL
ALP SERPL-CCNC: 118 U/L (ref 30–99)
ALT SERPL-CCNC: 18 U/L (ref 2–50)
ANION GAP SERPL CALC-SCNC: 14 MMOL/L (ref 7–16)
APPEARANCE UR: CLEAR
AST SERPL-CCNC: 23 U/L (ref 12–45)
BASOPHILS # BLD AUTO: 0.5 % (ref 0–1.8)
BASOPHILS # BLD: 0.04 K/UL (ref 0–0.12)
BILIRUB SERPL-MCNC: 0.2 MG/DL (ref 0.1–1.5)
BILIRUB UR QL STRIP.AUTO: NEGATIVE
BUN SERPL-MCNC: 19 MG/DL (ref 8–22)
CALCIUM SERPL-MCNC: 9.8 MG/DL (ref 8.4–10.2)
CHLORIDE SERPL-SCNC: 97 MMOL/L (ref 96–112)
CO2 SERPL-SCNC: 25 MMOL/L (ref 20–33)
COLOR UR: YELLOW
CREAT SERPL-MCNC: 1.53 MG/DL (ref 0.5–1.4)
EOSINOPHIL # BLD AUTO: 0.04 K/UL (ref 0–0.51)
EOSINOPHIL NFR BLD: 0.5 % (ref 0–6.9)
EPI CELLS #/AREA URNS HPF: ABNORMAL /HPF
ERYTHROCYTE [DISTWIDTH] IN BLOOD BY AUTOMATED COUNT: 40.8 FL (ref 35.9–50)
GFR SERPLBLD CREATININE-BSD FMLA CKD-EPI: 37 ML/MIN/1.73 M 2
GLOBULIN SER CALC-MCNC: 3.5 G/DL (ref 1.9–3.5)
GLUCOSE SERPL-MCNC: 106 MG/DL (ref 65–99)
GLUCOSE UR STRIP.AUTO-MCNC: NEGATIVE MG/DL
HCT VFR BLD AUTO: 39.2 % (ref 37–47)
HGB BLD-MCNC: 13 G/DL (ref 12–16)
HYALINE CASTS #/AREA URNS LPF: ABNORMAL /LPF
IMM GRANULOCYTES # BLD AUTO: 0.03 K/UL (ref 0–0.11)
IMM GRANULOCYTES NFR BLD AUTO: 0.4 % (ref 0–0.9)
KETONES UR STRIP.AUTO-MCNC: NEGATIVE MG/DL
LACTATE BLD-SCNC: 2.4 MMOL/L (ref 0.5–2)
LEUKOCYTE ESTERASE UR QL STRIP.AUTO: ABNORMAL
LIPASE SERPL-CCNC: 18 U/L (ref 7–58)
LYMPHOCYTES # BLD AUTO: 0.92 K/UL (ref 1–4.8)
LYMPHOCYTES NFR BLD: 10.8 % (ref 22–41)
MAGNESIUM SERPL-MCNC: 1.5 MG/DL (ref 1.5–2.5)
MCH RBC QN AUTO: 30.7 PG (ref 27–33)
MCHC RBC AUTO-ENTMCNC: 33.2 G/DL (ref 33.6–35)
MCV RBC AUTO: 92.5 FL (ref 81.4–97.8)
MICRO URNS: ABNORMAL
MONOCYTES # BLD AUTO: 0.62 K/UL (ref 0–0.85)
MONOCYTES NFR BLD AUTO: 7.3 % (ref 0–13.4)
MUCOUS THREADS #/AREA URNS HPF: ABNORMAL /HPF
NEUTROPHILS # BLD AUTO: 6.89 K/UL (ref 2–7.15)
NEUTROPHILS NFR BLD: 80.5 % (ref 44–72)
NITRITE UR QL STRIP.AUTO: NEGATIVE
NRBC # BLD AUTO: 0 K/UL
NRBC BLD-RTO: 0 /100 WBC
PH UR STRIP.AUTO: 5.5 [PH] (ref 5–8)
PLATELET # BLD AUTO: 187 K/UL (ref 164–446)
PMV BLD AUTO: 10.4 FL (ref 9–12.9)
POTASSIUM SERPL-SCNC: 3.5 MMOL/L (ref 3.6–5.5)
PROT SERPL-MCNC: 8.1 G/DL (ref 6–8.2)
PROT UR QL STRIP: NEGATIVE MG/DL
RBC # BLD AUTO: 4.24 M/UL (ref 4.2–5.4)
RBC UR QL AUTO: NEGATIVE
SODIUM SERPL-SCNC: 136 MMOL/L (ref 135–145)
SP GR UR STRIP.AUTO: 1.01
WBC # BLD AUTO: 8.5 K/UL (ref 4.8–10.8)
WBC #/AREA URNS HPF: ABNORMAL /HPF

## 2022-08-05 PROCEDURE — 83735 ASSAY OF MAGNESIUM: CPT

## 2022-08-05 PROCEDURE — 80053 COMPREHEN METABOLIC PANEL: CPT

## 2022-08-05 PROCEDURE — 700117 HCHG RX CONTRAST REV CODE 255: Performed by: EMERGENCY MEDICINE

## 2022-08-05 PROCEDURE — 36415 COLL VENOUS BLD VENIPUNCTURE: CPT

## 2022-08-05 PROCEDURE — 700111 HCHG RX REV CODE 636 W/ 250 OVERRIDE (IP): Performed by: EMERGENCY MEDICINE

## 2022-08-05 PROCEDURE — 96374 THER/PROPH/DIAG INJ IV PUSH: CPT | Mod: XU

## 2022-08-05 PROCEDURE — 99285 EMERGENCY DEPT VISIT HI MDM: CPT

## 2022-08-05 PROCEDURE — 700105 HCHG RX REV CODE 258: Performed by: EMERGENCY MEDICINE

## 2022-08-05 PROCEDURE — 96375 TX/PRO/DX INJ NEW DRUG ADDON: CPT

## 2022-08-05 PROCEDURE — 83690 ASSAY OF LIPASE: CPT

## 2022-08-05 PROCEDURE — 83605 ASSAY OF LACTIC ACID: CPT

## 2022-08-05 PROCEDURE — 74177 CT ABD & PELVIS W/CONTRAST: CPT | Mod: ME

## 2022-08-05 PROCEDURE — 85025 COMPLETE CBC W/AUTO DIFF WBC: CPT

## 2022-08-05 PROCEDURE — 81001 URINALYSIS AUTO W/SCOPE: CPT

## 2022-08-05 RX ORDER — SODIUM CHLORIDE, SODIUM LACTATE, POTASSIUM CHLORIDE, CALCIUM CHLORIDE 600; 310; 30; 20 MG/100ML; MG/100ML; MG/100ML; MG/100ML
1000 INJECTION, SOLUTION INTRAVENOUS ONCE
Status: COMPLETED | OUTPATIENT
Start: 2022-08-05 | End: 2022-08-05

## 2022-08-05 RX ORDER — PROCHLORPERAZINE EDISYLATE 5 MG/ML
10 INJECTION INTRAMUSCULAR; INTRAVENOUS ONCE
Status: COMPLETED | OUTPATIENT
Start: 2022-08-05 | End: 2022-08-05

## 2022-08-05 RX ORDER — HYDROMORPHONE HYDROCHLORIDE 1 MG/ML
0.5 INJECTION, SOLUTION INTRAMUSCULAR; INTRAVENOUS; SUBCUTANEOUS ONCE
Status: COMPLETED | OUTPATIENT
Start: 2022-08-05 | End: 2022-08-05

## 2022-08-05 RX ADMIN — PROCHLORPERAZINE EDISYLATE 10 MG: 5 INJECTION INTRAMUSCULAR; INTRAVENOUS at 21:26

## 2022-08-05 RX ADMIN — SODIUM CHLORIDE, POTASSIUM CHLORIDE, SODIUM LACTATE AND CALCIUM CHLORIDE 1000 ML: 600; 310; 30; 20 INJECTION, SOLUTION INTRAVENOUS at 21:08

## 2022-08-05 RX ADMIN — HYDROMORPHONE HYDROCHLORIDE 0.5 MG: 1 INJECTION, SOLUTION INTRAMUSCULAR; INTRAVENOUS; SUBCUTANEOUS at 21:39

## 2022-08-05 RX ADMIN — IOHEXOL 25 ML: 240 INJECTION, SOLUTION INTRATHECAL; INTRAVASCULAR; INTRAVENOUS; ORAL at 23:02

## 2022-08-05 RX ADMIN — IOHEXOL 100 ML: 350 INJECTION, SOLUTION INTRAVENOUS at 23:02

## 2022-08-05 ASSESSMENT — PAIN SCALES - WONG BAKER: WONGBAKER_NUMERICALRESPONSE: HURTS A WHOLE LOT

## 2022-08-05 ASSESSMENT — FIBROSIS 4 INDEX: FIB4 SCORE: 1.83

## 2022-08-06 VITALS
TEMPERATURE: 97.3 F | HEART RATE: 94 BPM | WEIGHT: 160.5 LBS | OXYGEN SATURATION: 95 % | DIASTOLIC BLOOD PRESSURE: 60 MMHG | BODY MASS INDEX: 21.74 KG/M2 | RESPIRATION RATE: 16 BRPM | HEIGHT: 72 IN | SYSTOLIC BLOOD PRESSURE: 120 MMHG

## 2022-08-06 NOTE — ED NOTES
Reminded pt need of urine sample. Pt verbalizes understanding. Pt is continuing to drink contrast. Slight nausea. Pain is better.

## 2022-08-06 NOTE — ED PROVIDER NOTES
ED Provider Note    CHIEF COMPLAINT  Chief Complaint   Patient presents with   • Abdominal Pain       HPI  Jana Overton is a 66 y.o. female who presents with 3 weeks of worsening abdominal pain.  She has a history of Crohn's disease with colectomy history and a history of left lower quadrant colostomy in place.  She states over the past days she has had worsening abdominal pain and nausea and she was referred here by Dr. Cruz's office as he is out of town.  No active vomiting.  No fevers.  States that her last abdominal surgery may have been about a year ago when she had an abdominal drain placed for intra-abdominal abscess.    REVIEW OF SYSTEMS  See [facility] [place] for further details. All other systems are negative.     PAST MEDICAL HISTORY   has a past medical history of Anesthesia, Anxiety, Arthritis, ASTHMA, Atrial fib/flut, Backpain, Bronchitis, Chronic pain, Colostomy in place ([facility]) (10/14/2010), COPD (chronic obstructive pulmonary disease) ([facility]) (6/24/2014), COPD (chronic obstructive pulmonary disease) ([facility]) (6/24/2014), Crohn's disease of colon ([facility]), Dyspnea, History of cardiac murmur, Hypokalemia (6/24/2014), Ileostomy present ([facility]), Infectious disease, Multiple falls (6/24/2014), Narcotic dependence ([facility]) (9/23/2009), Obstruction, Other specified disorder of intestines, Pain (7/11/13), Pneumonia, Postherpetic neuralgia (6/24/2014), Rosacea (6/24/2014), and Sleep apnea.    SOCIAL HISTORY  Social History     Tobacco Use   • Smoking status: Never Smoker   • Smokeless tobacco: Never Used   • Tobacco comment: second hand smoke parents - smoked for only 1 year many years ago   Vaping Use   • Vaping Use: Never used   Substance and Sexual Activity   • Alcohol use: Not Currently     Alcohol/week: 0.6 oz     Types: 1 Shots of liquor per week     Comment: gin and half a lime; tonic water   • Drug use: Not Currently     Types: Marijuana, Inhaled     Comment: medical marijuana through bong   • Sexual activity: Not on file      Comment:        SURGICAL HISTORY   has a past surgical history that includes lumbar fusion anterior; cervical disk and fusion anterior; foot surgery; hand surgery; other abdominal surgery; gastroscopy with biopsy (11/22/08); ileostomy (11/11/2009); dilation and curettage (9/24/2010); gastroscopy (10/4/2011); colonoscopy (10/4/2011); hardware removal neuro (7/16/2012); mammoplasty reduction (7/17/2013); ileostomy (5/14/2014); breast reduction; abdominal exploration; lumpectomy; colon resection; ileostomy (12/29/2018); sigmoidoscopy flex (12/29/2018); exploratory laparotomy (12/29/2018); gastroscopy (1/6/2019); gastroscopy (N/A, 5/22/2019); ileostomy (1/20/2021); lysis adhesions general (1/20/2021); cysto/uretero/pyeloscopy, dx (Right, 12/8/2021); cysto stent placemnt pre surg (Right, 12/8/2021); cystoscopy,insert ureteral stent (Right, 12/23/2021); and cysto/uretero/pyeloscopy, dx (Right, 12/23/2021).    CURRENT MEDICATIONS  Home Medications     Reviewed by Imelda Cueva R.N. (Registered Nurse) on 08/05/22 at 1833  Med List Status: Not Addressed   Medication Last Dose Status   furosemide (LASIX) 20 MG Tab  Active   hydrALAZINE (APRESOLINE) 10 MG Tab  Active   metoprolol tartrate (LOPRESSOR) 50 MG Tab  Active   naratriptan (AMERGE) 2.5 MG tablet  Active   ondansetron (ZOFRAN ODT) 4 MG TABLET DISPERSIBLE  Active   oxyCODONE immediate release (ROXICODONE) 10 MG immediate release tablet  Active   potassium chloride SA (KDUR) 20 MEQ Tab CR  Active   Probiotic Product (PROBIOTIC PO)  Active   prochlorperazine (COMPAZINE) 10 MG Tab  Active   traZODone (DESYREL) 50 MG Tab  Active                ALLERGIES  Allergies   Allergen Reactions   • Azathioprine Sodium Hives and Shortness of Breath   • Infliximab Hives, Shortness of Breath and Rash     .   • Methotrexate Hives, Shortness of Breath and Rash     .   • Methotrexate Hives, Rash and Shortness of Breath     .   • Morphine Shortness of Breath, Swelling and  "Palpitations   • Promethazine Shortness of Breath and Rash     .   • Roxanol [Morphine Sulfate] Swelling     Throat swells   • Amitriptyline Unspecified     Nightmares     • Carafate [Sucralfate] Vomiting and Nausea     Generalizes/Mouth Sores   • Demerol Vomiting   • Fentanyl Unspecified     Patches caused skin breakdown, no pain relief   • Fentanyl      \"Patches didn't work\"  Skin broke down   • Lorazepam Unspecified     States give me nightmares.    • Meperidine Vomiting   • Methadone Unspecified     Didn't work   • Other Drug Unspecified     steroids   • Pregabalin Rash     Rash     • Pseudoephedrine Vomiting   • Sulfa Drugs Hives and Rash     .  .   • Ketorolac Tromethamine    • Betamethasone Unspecified     Pt unable to remember   • Celestone [Betamethasone Sodium Phosphate] Unspecified     Pt unable to remember   • Sulfasalazine Rash     On chest       PHYSICAL EXAM  VITAL SIGNS: BP (!) 161/80   Pulse (!) 103   Temp 36.8 °C (98.3 °F) (Temporal)   Resp 16   Ht 1.829 m (6')   Wt 72.8 kg (160 lb 7.9 oz)   SpO2 94%   BMI 21.77 kg/m²   Pulse ox interpretation: I interpret this pulse ox as normal.  Constitutional: Alert in no apparent distress.  HENT: No signs of trauma, Bilateral external ears normal, Nose normal.   Eyes: Pupils are equal and reactive, Conjunctiva normal, Non-icteric.   Neck: Normal range of motion, No tenderness, Supple, No stridor.   Lymphatic: No lymphadenopathy noted.   Cardiovascular: Regular rate and rhythm.   Thorax & Lungs: Normal breath sounds, No respiratory distress  Abdomen: Diffuse soft, No tenderness, No masses, No pulsatile masses. No peritoneal signs.  Skin: Warm, Dry, No erythema, No rash.   Extremities: Intact distal pulses, No edema, No tenderness, No cyanosis  Musculoskeletal: Good range of motion in all major joints. No major deformities noted.   Neurologic: Alert, No focal deficits noted.       DIAGNOSTIC STUDIES / PROCEDURES    EKG - Physician " interpretation      LABS  Labs Reviewed   CBC WITH DIFFERENTIAL - Abnormal; Notable for the following components:       Result Value    MCHC 33.2 (*)     Neutrophils-Polys 80.50 (*)     Lymphocytes 10.80 (*)     Lymphs (Absolute) 0.92 (*)     All other components within normal limits   COMP METABOLIC PANEL - Abnormal; Notable for the following components:    Potassium 3.5 (*)     Glucose 106 (*)     Creatinine 1.53 (*)     Alkaline Phosphatase 118 (*)     All other components within normal limits   URINALYSIS - Abnormal; Notable for the following components:    Leukocyte Esterase Moderate (*)     All other components within normal limits   LACTIC ACID - Abnormal; Notable for the following components:    Lactic Acid 2.4 (*)     All other components within normal limits   ESTIMATED GFR - Abnormal; Notable for the following components:    GFR (CKD-EPI) 37 (*)     All other components within normal limits   URINE MICROSCOPIC (W/UA) - Abnormal; Notable for the following components:    WBC 5-10 (*)     Hyaline Cast 3-5 (*)     All other components within normal limits   LIPASE   MAGNESIUM         RADIOLOGY  CT-ABDOMEN-PELVIS WITH   Final Result         1. No acute inflammatory change in the abdomen or pelvis.            COURSE & MEDICAL DECISION MAKING    Medications   lactated ringers (LR) bolus (0 mL Intravenous Stopped 8/5/22 2331)   prochlorperazine (COMPAZINE) injection 10 mg (10 mg Intravenous Given 8/5/22 2126)   HYDROmorphone (Dilaudid) injection 0.5 mg (0.5 mg Intravenous Given 8/5/22 2139)   iohexol (OMNIPAQUE) 350 mg/mL (IV) (100 mL Intravenous Given 8/5/22 2302)   iohexol (OMNIPAQUE) 240 mg/mL (Oral) (25 mL Oral Given 8/5/22 2302)       Pertinent Labs & Imaging studies reviewed. (See chart for details)  66 y.o. female with a history of Crohn's disease status post colectomy and left lower quadrant colostomy, presenting with decreased ostomy output and worsening abdominal pain over the past several weeks but  worse especially over the past few days.  She is slightly tachycardic upon arrival.  No active vomiting.  Laboratory studies were performed.  No leukocytosis.  She does have slight FLORES with elevated creatinine of 1.5.  Likely dehydration from decreased oral intake secondary to abdominal pain symptoms and nausea.  She was given IV nausea medications and IV fluid hydration.    CT abdomen pelvis with contrast was performed given the patient's complex abdominal surgical history.  No evidence of intra-abdominal abscess or obstruction.  Unclear etiology of the patient's abdominal pain symptoms though work-up to this point is very reassuring given unremarkable CT and no signs of sepsis at this time.    Patient appears stable for discharge.  Recommending outpatient follow-up with her abdominal surgeon.    The patient will not drink alcohol nor drive with prescribed medications.   The patient was instructed to follow-up with primary care physician for further management.  To return immediately for any worsening symptoms or development of any other concerning signs or symptoms. The patient verbalizes understanding in their own words.    /60   Pulse 94   Temp 36.3 °C (97.3 °F) (Temporal)   Resp 16   Ht 1.829 m (6')   Wt 72.8 kg (160 lb 7.9 oz)   SpO2 95%   BMI 21.77 kg/m²     The patient was referred to primary care where they will receive further BP management.      [facility], Emergency Dept  [address]  [place] [address] [phone]    As needed, If symptoms worsen    Primary care doctor    Schedule an appointment as soon as possible for a visit         FINAL IMPRESSION  1. Generalized abdominal pain    2. FLORES (acute kidney injury) (HCC)            Electronically signed by: Seven Coughlin M.D., 8/5/2022 8:40 PM

## 2022-08-06 NOTE — ED TRIAGE NOTES
"Pt presents with  from home for RLQ pain that began 2-3 weeks ago. Reports decreased urine output and decreased ostomy output. No fever but reports chills. Nausea over the day and using her prescriptions at home but is \"burping vomit.\" history of crohns. Also reporting vaginal discharge that has not been diagnosed thus far. Pt A&Ox4 and ambulatory.   "

## 2022-08-13 DIAGNOSIS — T50.905A DRUG-INDUCED LUPUS ERYTHEMATOSUS: ICD-10-CM

## 2022-08-13 DIAGNOSIS — L93.2 DRUG-INDUCED LUPUS ERYTHEMATOSUS: ICD-10-CM

## 2022-08-13 RX ORDER — HYDROXYCHLOROQUINE SULFATE 200 MG/1
300 TABLET, FILM COATED ORAL DAILY
Qty: 135 TABLET | Refills: 3 | Status: SHIPPED | OUTPATIENT
Start: 2022-08-13 | End: 2023-02-15 | Stop reason: SDUPTHER

## 2022-08-23 ENCOUNTER — APPOINTMENT (RX ONLY)
Dept: URBAN - METROPOLITAN AREA CLINIC 20 | Facility: CLINIC | Age: 67
Setting detail: DERMATOLOGY
End: 2022-08-23

## 2022-08-23 DIAGNOSIS — D18.0 HEMANGIOMA: ICD-10-CM

## 2022-08-23 DIAGNOSIS — L81.4 OTHER MELANIN HYPERPIGMENTATION: ICD-10-CM

## 2022-08-23 DIAGNOSIS — L30.9 DERMATITIS, UNSPECIFIED: ICD-10-CM

## 2022-08-23 DIAGNOSIS — L71.8 OTHER ROSACEA: ICD-10-CM | Status: WELL CONTROLLED

## 2022-08-23 DIAGNOSIS — Z85.828 PERSONAL HISTORY OF OTHER MALIGNANT NEOPLASM OF SKIN: ICD-10-CM

## 2022-08-23 DIAGNOSIS — B00.1 HERPESVIRAL VESICULAR DERMATITIS: ICD-10-CM

## 2022-08-23 DIAGNOSIS — L82.1 OTHER SEBORRHEIC KERATOSIS: ICD-10-CM

## 2022-08-23 DIAGNOSIS — L57.0 ACTINIC KERATOSIS: ICD-10-CM

## 2022-08-23 DIAGNOSIS — Z71.89 OTHER SPECIFIED COUNSELING: ICD-10-CM

## 2022-08-23 DIAGNOSIS — D22 MELANOCYTIC NEVI: ICD-10-CM

## 2022-08-23 PROBLEM — D22.5 MELANOCYTIC NEVI OF TRUNK: Status: ACTIVE | Noted: 2022-08-23

## 2022-08-23 PROBLEM — D22.39 MELANOCYTIC NEVI OF OTHER PARTS OF FACE: Status: ACTIVE | Noted: 2022-08-23

## 2022-08-23 PROBLEM — D22.61 MELANOCYTIC NEVI OF RIGHT UPPER LIMB, INCLUDING SHOULDER: Status: ACTIVE | Noted: 2022-08-23

## 2022-08-23 PROBLEM — D22.72 MELANOCYTIC NEVI OF LEFT LOWER LIMB, INCLUDING HIP: Status: ACTIVE | Noted: 2022-08-23

## 2022-08-23 PROBLEM — D22.62 MELANOCYTIC NEVI OF LEFT UPPER LIMB, INCLUDING SHOULDER: Status: ACTIVE | Noted: 2022-08-23

## 2022-08-23 PROBLEM — D18.01 HEMANGIOMA OF SKIN AND SUBCUTANEOUS TISSUE: Status: ACTIVE | Noted: 2022-08-23

## 2022-08-23 PROBLEM — D22.71 MELANOCYTIC NEVI OF RIGHT LOWER LIMB, INCLUDING HIP: Status: ACTIVE | Noted: 2022-08-23

## 2022-08-23 PROCEDURE — 99213 OFFICE O/P EST LOW 20 MIN: CPT | Mod: 25

## 2022-08-23 PROCEDURE — 17000 DESTRUCT PREMALG LESION: CPT

## 2022-08-23 PROCEDURE — ? PHOTODYNAMIC THERAPY COUNSELING

## 2022-08-23 PROCEDURE — ? COUNSELING

## 2022-08-23 PROCEDURE — 17003 DESTRUCT PREMALG LES 2-14: CPT

## 2022-08-23 PROCEDURE — ? LIQUID NITROGEN

## 2022-08-23 PROCEDURE — ? ADDITIONAL NOTES

## 2022-08-23 PROCEDURE — ? PRESCRIPTION

## 2022-08-23 PROCEDURE — ? MEDICATION COUNSELING

## 2022-08-23 PROCEDURE — ? SUNSCREEN RECOMMENDATIONS

## 2022-08-23 RX ORDER — TRIAMCINOLONE ACETONIDE 1 MG/G
CREAM TOPICAL BID
Qty: 1 | Refills: 1 | Status: ERX | COMMUNITY
Start: 2022-08-23

## 2022-08-23 RX ORDER — ACYCLOVIR 400 MG/1
TABLET ORAL
Qty: 30 | Refills: 1 | Status: ERX

## 2022-08-23 RX ADMIN — TRIAMCINOLONE ACETONIDE: 1 CREAM TOPICAL at 00:00

## 2022-08-23 ASSESSMENT — LOCATION DETAILED DESCRIPTION DERM
LOCATION DETAILED: LEFT VENTRAL PROXIMAL FOREARM
LOCATION DETAILED: RIGHT DISTAL POSTERIOR THIGH
LOCATION DETAILED: RIGHT INFERIOR CENTRAL MALAR CHEEK
LOCATION DETAILED: LEFT MEDIAL MALAR CHEEK
LOCATION DETAILED: RIGHT PROXIMAL PRETIBIAL REGION
LOCATION DETAILED: LEFT SUPERIOR MEDIAL MALAR CHEEK
LOCATION DETAILED: LEFT FOREHEAD
LOCATION DETAILED: LEFT DISTAL POSTERIOR THIGH
LOCATION DETAILED: LEFT PROXIMAL POSTERIOR UPPER ARM
LOCATION DETAILED: INFERIOR THORACIC SPINE
LOCATION DETAILED: RIGHT DISTAL DORSAL FOREARM
LOCATION DETAILED: RIGHT INFERIOR MEDIAL MIDBACK
LOCATION DETAILED: RIGHT INFERIOR FOREHEAD
LOCATION DETAILED: RIGHT VENTRAL PROXIMAL FOREARM
LOCATION DETAILED: LEFT INFERIOR CENTRAL MALAR CHEEK
LOCATION DETAILED: LEFT LATERAL PROXIMAL PRETIBIAL REGION
LOCATION DETAILED: RIGHT INFERIOR MEDIAL UPPER BACK
LOCATION DETAILED: RIGHT DISTAL POSTERIOR UPPER ARM
LOCATION DETAILED: RIGHT SUPERIOR UPPER BACK

## 2022-08-23 ASSESSMENT — LOCATION SIMPLE DESCRIPTION DERM
LOCATION SIMPLE: RIGHT CHEEK
LOCATION SIMPLE: RIGHT UPPER BACK
LOCATION SIMPLE: UPPER BACK
LOCATION SIMPLE: RIGHT FOREHEAD
LOCATION SIMPLE: RIGHT FOREARM
LOCATION SIMPLE: RIGHT POSTERIOR UPPER ARM
LOCATION SIMPLE: RIGHT LOWER BACK
LOCATION SIMPLE: LEFT FOREHEAD
LOCATION SIMPLE: LEFT PRETIBIAL REGION
LOCATION SIMPLE: RIGHT POSTERIOR THIGH
LOCATION SIMPLE: RIGHT PRETIBIAL REGION
LOCATION SIMPLE: LEFT POSTERIOR THIGH
LOCATION SIMPLE: LEFT CHEEK
LOCATION SIMPLE: LEFT POSTERIOR UPPER ARM
LOCATION SIMPLE: LEFT FOREARM

## 2022-08-23 ASSESSMENT — LOCATION ZONE DERM
LOCATION ZONE: LEG
LOCATION ZONE: FACE
LOCATION ZONE: ARM
LOCATION ZONE: TRUNK

## 2022-08-23 NOTE — PROCEDURE: LIQUID NITROGEN
Render Note In Bullet Format When Appropriate: No
Duration Of Freeze Thaw-Cycle (Seconds): 10
Show Applicator Variable?: Yes
Post-Care Instructions: I reviewed with the patient in detail post-care instructions. Patient is to wear sunprotection, and avoid picking at any of the treated lesions. Pt may apply Vaseline to crusted or scabbing areas.
Consent: The patient's consent was obtained including but not limited to risks of crusting, scabbing, blistering, scarring, darker or lighter pigmentary change, recurrence, incomplete removal and infection. RTC in 2 months if lesion(s) persistent.
Detail Level: Detailed
Number Of Freeze-Thaw Cycles: 2 freeze-thaw cycles

## 2022-08-23 NOTE — PROCEDURE: ADDITIONAL NOTES
Render Risk Assessment In Note?: no
Additional Notes: Continue azelaic acid and Clindamycin-bp. No refills needed today.
Detail Level: Detailed
Additional Notes: Will submit auth for arms PDT.\\nHistory of HSV

## 2022-08-23 NOTE — PROCEDURE: MEDICATION COUNSELING
Use Enhanced Medication Counseling?: No
Erythromycin Pregnancy And Lactation Text: This medication is Pregnancy Category B and is considered safe during pregnancy. It is also excreted in breast milk.
Topical Sulfur Applications Pregnancy And Lactation Text: This medication is Pregnancy Category C and has an unknown safety profile during pregnancy. It is unknown if this topical medication is excreted in breast milk.
Imiquimod Pregnancy And Lactation Text: This medication is Pregnancy Category C. It is unknown if this medication is excreted in breast milk.
Hydroxyzine Counseling: Patient advised that the medication is sedating and not to drive a car after taking this medication.  Patient informed of potential adverse effects including but not limited to dry mouth, urinary retention, and blurry vision.  The patient verbalized understanding of the proper use and possible adverse effects of hydroxyzine.  All of the patient's questions and concerns were addressed.
Rituxan Counseling:  I discussed with the patient the risks of Rituxan infusions. Side effects can include infusion reactions, severe drug rashes including mucocutaneous reactions, reactivation of latent hepatitis and other infections and rarely progressive multifocal leukoencephalopathy.  All of the patient's questions and concerns were addressed.
Libtayo Counseling- I discussed with the patient the risks of Libtayo including but not limited to nausea, vomiting, diarrhea, and bone or muscle pain.  The patient verbalized understanding of the proper use and possible adverse effects of Libtayo.  All of the patient's questions and concerns were addressed.
Humira Pregnancy And Lactation Text: This medication is Pregnancy Category B and is considered safe during pregnancy. It is unknown if this medication is excreted in breast milk.
Prednisone Counseling:  I discussed with the patient the risks of prolonged use of prednisone including but not limited to weight gain, insomnia, osteoporosis, mood changes, diabetes, susceptibility to infection, glaucoma and high blood pressure.  In cases where prednisone use is prolonged, patients should be monitored with blood pressure checks, serum glucose levels and an eye exam.  Additionally, the patient may need to be placed on GI prophylaxis, PCP prophylaxis, and calcium and vitamin D supplementation and/or a bisphosphonate.  The patient verbalized understanding of the proper use and the possible adverse effects of prednisone.  All of the patient's questions and concerns were addressed.
Adbry Pregnancy And Lactation Text: It is unknown if this medication will adversely affect pregnancy or breast feeding.
Spironolactone Pregnancy And Lactation Text: This medication can cause feminization of the male fetus and should be avoided during pregnancy. The active metabolite is also found in breast milk.
Picato Counseling:  I discussed with the patient the risks of Picato including but not limited to erythema, scaling, itching, weeping, crusting, and pain.
Itraconazole Pregnancy And Lactation Text: This medication is Pregnancy Category C and it isn't know if it is safe during pregnancy. It is also excreted in breast milk.
High Dose Vitamin A Pregnancy And Lactation Text: High dose vitamin A therapy is contraindicated during pregnancy and breast feeding.
Otezla Counseling: The side effects of Otezla were discussed with the patient, including but not limited to worsening or new depression, weight loss, diarrhea, nausea, upper respiratory tract infection, and headache. Patient instructed to call the office should any adverse effect occur.  The patient verbalized understanding of the proper use and possible adverse effects of Otezla.  All the patient's questions and concerns were addressed.
Arava Counseling:  Patient counseled regarding adverse effects of Arava including but not limited to nausea, vomiting, abnormalities in liver function tests. Patients may develop mouth sores, rash, diarrhea, and abnormalities in blood counts. The patient understands that monitoring is required including LFTs and blood counts.  There is a rare possibility of scarring of the liver and lung problems that can occur when taking methotrexate. Persistent nausea, loss of appetite, pale stools, dark urine, cough, and shortness of breath should be reported immediately. Patient advised to discontinue Arava treatment and consult with a physician prior to attempting conception. The patient will have to undergo a treatment to eliminate Arava from the body prior to conception.
Cibinqo Counseling: I discussed with the patient the risks of Cibinqo therapy including but not limited to common cold, nausea, headache, cold sores, increased blood CPK levels, dizziness, UTIs, fatigue, acne, and vomitting. Live vaccines should be avoided.  This medication has been linked to serious infections; higher rate of mortality; malignancy and lymphoproliferative disorders; major adverse cardiovascular events; thrombosis; thrombocytopenia and lymphopenia; lipid elevations; and retinal detachment.
Sarecycline Pregnancy And Lactation Text: This medication is Pregnancy Category D and not consider safe during pregnancy. It is also excreted in breast milk.
Libtayo Pregnancy And Lactation Text: This medication is contraindicated in pregnancy and when breast feeding.
Carac Pregnancy And Lactation Text: This medication is Pregnancy Category X and contraindicated in pregnancy and in women who may become pregnant. It is unknown if this medication is excreted in breast milk.
Rituxan Pregnancy And Lactation Text: This medication is Pregnancy Category C and it isn't know if it is safe during pregnancy. It is unknown if this medication is excreted in breast milk but similar antibodies are known to be excreted.
Topical Retinoid counseling:  Patient advised to apply a pea-sized amount only at bedtime and wait 30 minutes after washing their face before applying.  If too drying, patient may add a non-comedogenic moisturizer. The patient verbalized understanding of the proper use and possible adverse effects of retinoids.  All of the patient's questions and concerns were addressed.
Dutasteride Pregnancy And Lactation Text: This medication is absolutely contraindicated in women, especially during pregnancy and breast feeding. Feminization of male fetuses is possible if taking while pregnant.
Bactrim Counseling:  I discussed with the patient the risks of sulfa antibiotics including but not limited to GI upset, allergic reaction, drug rash, diarrhea, dizziness, photosensitivity, and yeast infections.  Rarely, more serious reactions can occur including but not limited to aplastic anemia, agranulocytosis, methemoglobinemia, blood dyscrasias, liver or kidney failure, lung infiltrates or desquamative/blistering drug rashes.
Azathioprine Pregnancy And Lactation Text: This medication is Pregnancy Category D and isn't considered safe during pregnancy. It is unknown if this medication is excreted in breast milk.
Taltz Counseling: I discussed with the patient the risks of ixekizumab including but not limited to immunosuppression, serious infections, worsening of inflammatory bowel disease and drug reactions.  The patient understands that monitoring is required including a PPD at baseline and must alert us or the primary physician if symptoms of infection or other concerning signs are noted.
Hydroxyzine Pregnancy And Lactation Text: This medication is not safe during pregnancy and should not be taken. It is also excreted in breast milk and breast feeding isn't recommended.
SSKI Counseling:  I discussed with the patient the risks of SSKI including but not limited to thyroid abnormalities, metallic taste, GI upset, fever, headache, acne, arthralgias, paraesthesias, lymphadenopathy, easy bleeding, arrhythmias, and allergic reaction.
Wartpeel Counseling:  I discussed with the patient the risks of Wartpeel including but not limited to erythema, scaling, itching, weeping, crusting, and pain.
Calcipotriene Counseling:  I discussed with the patient the risks of calcipotriene including but not limited to erythema, scaling, itching, and irritation.
Drysol Pregnancy And Lactation Text: This medication is considered safe during pregnancy and breast feeding.
Bactrim Pregnancy And Lactation Text: This medication is Pregnancy Category D and is known to cause fetal risk.  It is also excreted in breast milk.
Ketoconazole Counseling:   Patient counseled regarding improving absorption with orange juice.  Adverse effects include but are not limited to breast enlargement, headache, diarrhea, nausea, upset stomach, liver function test abnormalities, taste disturbance, and stomach pain.  There is a rare possibility of liver failure that can occur when taking ketoconazole. The patient understands that monitoring of LFTs may be required, especially at baseline. The patient verbalized understanding of the proper use and possible adverse effects of ketoconazole.  All of the patient's questions and concerns were addressed.
Prednisone Pregnancy And Lactation Text: This medication is Pregnancy Category C and it isn't know if it is safe during pregnancy. This medication is excreted in breast milk.
Klisyri Counseling:  I discussed with the patient the risks of Klisyri including but not limited to erythema, scaling, itching, weeping, crusting, and pain.
Metronidazole Counseling:  I discussed with the patient the risks of metronidazole including but not limited to seizures, nausea/vomiting, a metallic taste in the mouth, nausea/vomiting and severe allergy.
Ilumya Counseling: I discussed with the patient the risks of tildrakizumab including but not limited to immunosuppression, malignancy, posterior leukoencephalopathy syndrome, and serious infections.  The patient understands that monitoring is required including a PPD at baseline and must alert us or the primary physician if symptoms of infection or other concerning signs are noted.
Cibinqo Pregnancy And Lactation Text: It is unknown if this medication will adversely affect pregnancy or breast feeding.  You should not take this medication if you are currently pregnant or planning a pregnancy or while breastfeeding.
Siliq Counseling:  I discussed with the patient the risks of Siliq including but not limited to new or worsening depression, suicidal thoughts and behavior, immunosuppression, malignancy, posterior leukoencephalopathy syndrome, and serious infections.  The patient understands that monitoring is required including a PPD at baseline and must alert us or the primary physician if symptoms of infection or other concerning signs are noted. There is also a special program designed to monitor depression which is required with Siliq.
Tetracycline Counseling: Patient counseled regarding possible photosensitivity and increased risk for sunburn.  Patient instructed to avoid sunlight, if possible.  When exposed to sunlight, patients should wear protective clothing, sunglasses, and sunscreen.  The patient was instructed to call the office immediately if the following severe adverse effects occur:  hearing changes, easy bruising/bleeding, severe headache, or vision changes.  The patient verbalized understanding of the proper use and possible adverse effects of tetracycline.  All of the patient's questions and concerns were addressed. Patient understands to avoid pregnancy while on therapy due to potential birth defects.
Arava Pregnancy And Lactation Text: This medication is Pregnancy Category X and is absolutely contraindicated during pregnancy. It is unknown if it is excreted in breast milk.
Protopic Counseling: Patient may experience a mild burning sensation during topical application. Protopic is not approved in children less than 2 years of age. There have been case reports of hematologic and skin malignancies in patients using topical calcineurin inhibitors although causality is questionable.
Niacinamide Counseling: I recommended taking niacin or niacinamide, also know as vitamin B3, twice daily. Recent evidence suggests that taking vitamin B3 (500 mg twice daily) can reduce the risk of actinic keratoses and non-melanoma skin cancers. Side effects of vitamin B3 include flushing and headache.
Ketoconazole Pregnancy And Lactation Text: This medication is Pregnancy Category C and it isn't know if it is safe during pregnancy. It is also excreted in breast milk and breast feeding isn't recommended.
Cellcept Counseling:  I discussed with the patient the risks of mycophenolate mofetil including but not limited to infection/immunosuppression, GI upset, hypokalemia, hypercholesterolemia, bone marrow suppression, lymphoproliferative disorders, malignancy, GI ulceration/bleed/perforation, colitis, interstitial lung disease, kidney failure, progressive multifocal leukoencephalopathy, and birth defects.  The patient understands that monitoring is required including a baseline creatinine and regular CBC testing. In addition, patient must alert us immediately if symptoms of infection or other concerning signs are noted.
Otezla Pregnancy And Lactation Text: This medication is Pregnancy Category C and it isn't known if it is safe during pregnancy. It is unknown if it is excreted in breast milk.
Taltz Pregnancy And Lactation Text: The risk during pregnancy and breastfeeding is uncertain with this medication.
Metronidazole Pregnancy And Lactation Text: This medication is Pregnancy Category B and considered safe during pregnancy.  It is also excreted in breast milk.
Klisyri Pregnancy And Lactation Text: It is unknown if this medication can harm a developing fetus or if it is excreted in breast milk.
Humira Counseling:  I discussed with the patient the risks of adalimumab including but not limited to myelosuppression, immunosuppression, autoimmune hepatitis, demyelinating diseases, lymphoma, and serious infections.  The patient understands that monitoring is required including a PPD at baseline and must alert us or the primary physician if symptoms of infection or other concerning signs are noted.
Erivedge Counseling- I discussed with the patient the risks of Erivedge including but not limited to nausea, vomiting, diarrhea, constipation, weight loss, changes in the sense of taste, decreased appetite, muscle spasms, and hair loss.  The patient verbalized understanding of the proper use and possible adverse effects of Erivedge.  All of the patient's questions and concerns were addressed.
Tazorac Counseling:  Patient advised that medication is irritating and drying.  Patient may need to apply sparingly and wash off after an hour before eventually leaving it on overnight.  The patient verbalized understanding of the proper use and possible adverse effects of tazorac.  All of the patient's questions and concerns were addressed.
Elidel Counseling: Patient may experience a mild burning sensation during topical application. Elidel is not approved in children less than 2 years of age. There have been case reports of hematologic and skin malignancies in patients using topical calcineurin inhibitors although causality is questionable.
Cephalexin Counseling: I counseled the patient regarding use of cephalexin as an antibiotic for prophylactic and/or therapeutic purposes. Cephalexin (commonly prescribed under brand name Keflex) is a cephalosporin antibiotic which is active against numerous classes of bacteria, including most skin bacteria. Side effects may include nausea, diarrhea, gastrointestinal upset, rash, hives, yeast infections, and in rare cases, hepatitis, kidney disease, seizures, fever, confusion, neurologic symptoms, and others. Patients with severe allergies to penicillin medications are cautioned that there is about a 10% incidence of cross-reactivity with cephalosporins. When possible, patients with penicillin allergies should use alternatives to cephalosporins for antibiotic therapy.
Sski Pregnancy And Lactation Text: This medication is Pregnancy Category D and isn't considered safe during pregnancy. It is excreted in breast milk.
Tremfya Counseling: I discussed with the patient the risks of guselkumab including but not limited to immunosuppression, serious infections, worsening of inflammatory bowel disease and drug reactions.  The patient understands that monitoring is required including a PPD at baseline and must alert us or the primary physician if symptoms of infection or other concerning signs are noted.
Aklief counseling:  Patient advised to apply a pea-sized amount only at bedtime and wait 30 minutes after washing their face before applying.  If too drying, patient may add a non-comedogenic moisturizer.  The most commonly reported side effects including irritation, redness, scaling, dryness, stinging, burning, itching, and increased risk of sunburn.  The patient verbalized understanding of the proper use and possible adverse effects of retinoids.  All of the patient's questions and concerns were addressed.
Oxybutynin Counseling:  I discussed with the patient the risks of oxybutynin including but not limited to skin rash, drowsiness, dry mouth, difficulty urinating, and blurred vision.
Calcipotriene Pregnancy And Lactation Text: This medication has not been proven safe during pregnancy. It is unknown if this medication is excreted in breast milk.
Acitretin Counseling:  I discussed with the patient the risks of acitretin including but not limited to hair loss, dry lips/skin/eyes, liver damage, hyperlipidemia, depression/suicidal ideation, photosensitivity.  Serious rare side effects can include but are not limited to pancreatitis, pseudotumor cerebri, bony changes, clot formation/stroke/heart attack.  Patient understands that alcohol is contraindicated since it can result in liver toxicity and significantly prolong the elimination of the drug by many years.
Cimzia Counseling:  I discussed with the patient the risks of Cimzia including but not limited to immunosuppression, allergic reactions and infections.  The patient understands that monitoring is required including a PPD at baseline and must alert us or the primary physician if symptoms of infection or other concerning signs are noted.
Winlevi Counseling:  I discussed with the patient the risks of topical clascoterone including but not limited to erythema, scaling, itching, and stinging. Patient voiced their understanding.
Minoxidil Counseling: Minoxidil is a topical medication which can increase blood flow where it is applied. It is uncertain how this medication increases hair growth. Side effects are uncommon and include stinging and allergic reactions.
Minocycline Counseling: Patient advised regarding possible photosensitivity and discoloration of the teeth, skin, lips, tongue and gums.  Patient instructed to avoid sunlight, if possible.  When exposed to sunlight, patients should wear protective clothing, sunglasses, and sunscreen.  The patient was instructed to call the office immediately if the following severe adverse effects occur:  hearing changes, easy bruising/bleeding, severe headache, or vision changes.  The patient verbalized understanding of the proper use and possible adverse effects of minocycline.  All of the patient's questions and concerns were addressed.
Albendazole Counseling:  I discussed with the patient the risks of albendazole including but not limited to cytopenia, kidney damage, nausea/vomiting and severe allergy.  The patient understands that this medication is being used in an off-label manner.
Infliximab Counseling:  I discussed with the patient the risks of infliximab including but not limited to myelosuppression, immunosuppression, autoimmune hepatitis, demyelinating diseases, lymphoma, and serious infections.  The patient understands that monitoring is required including a PPD at baseline and must alert us or the primary physician if symptoms of infection or other concerning signs are noted.
Protopic Pregnancy And Lactation Text: This medication is Pregnancy Category C. It is unknown if this medication is excreted in breast milk when applied topically.
Clofazimine Counseling:  I discussed with the patient the risks of clofazimine including but not limited to skin and eye pigmentation, liver damage, nausea/vomiting, gastrointestinal bleeding and allergy.
Niacinamide Pregnancy And Lactation Text: These medications are considered safe during pregnancy.
Terbinafine Counseling: Patient counseling regarding adverse effects of terbinafine including but not limited to headache, diarrhea, rash, upset stomach, liver function test abnormalities, itching, taste/smell disturbance, nausea, abdominal pain, and flatulence.  There is a rare possibility of liver failure that can occur when taking terbinafine.  The patient understands that a baseline LFT and kidney function test may be required. The patient verbalized understanding of the proper use and possible adverse effects of terbinafine.  All of the patient's questions and concerns were addressed.
Oxybutynin Pregnancy And Lactation Text: This medication is Pregnancy Category B and is considered safe during pregnancy. It is unknown if it is excreted in breast milk.
Detail Level: Zone
Finasteride Male Counseling: Finasteride Counseling:  I discussed with the patient the risks of use of finasteride including but not limited to decreased libido, decreased ejaculate volume, gynecomastia, and depression. Women should not handle medication.  All of the patient's questions and concerns were addressed.
Albendazole Pregnancy And Lactation Text: This medication is Pregnancy Category C and it isn't known if it is safe during pregnancy. It is also excreted in breast milk.
Thalidomide Counseling: I discussed with the patient the risks of thalidomide including but not limited to birth defects, anxiety, weakness, chest pain, dizziness, cough and severe allergy.
Qbrexza Counseling:  I discussed with the patient the risks of Qbrexza including but not limited to headache, mydriasis, blurred vision, dry eyes, nasal dryness, dry mouth, dry throat, dry skin, urinary hesitation, and constipation.  Local skin reactions including erythema, burning, stinging, and itching can also occur.
Aklief Pregnancy And Lactation Text: It is unknown if this medication is safe to use during pregnancy.  It is unknown if this medication is excreted in breast milk.  Breastfeeding women should use the topical cream on the smallest area of the skin for the shortest time needed while breastfeeding.  Do not apply to nipple and areola.
Tazorac Pregnancy And Lactation Text: This medication is not safe during pregnancy. It is unknown if this medication is excreted in breast milk.
Cephalexin Pregnancy And Lactation Text: This medication is Pregnancy Category B and considered safe during pregnancy.  It is also excreted in breast milk but can be used safely for shorter doses.
Winlevi Pregnancy And Lactation Text: This medication is considered safe during pregnancy and breastfeeding.
Minoxidil Pregnancy And Lactation Text: This medication has not been assigned a Pregnancy Risk Category but animal studies failed to show danger with the topical medication. It is unknown if the medication is excreted in breast milk.
Fluconazole Counseling:  Patient counseled regarding adverse effects of fluconazole including but not limited to headache, diarrhea, nausea, upset stomach, liver function test abnormalities, taste disturbance, and stomach pain.  There is a rare possibility of liver failure that can occur when taking fluconazole.  The patient understands that monitoring of LFTs and kidney function test may be required, especially at baseline. The patient verbalized understanding of the proper use and possible adverse effects of fluconazole.  All of the patient's questions and concerns were addressed.
Simponi Counseling:  I discussed with the patient the risks of golimumab including but not limited to myelosuppression, immunosuppression, autoimmune hepatitis, demyelinating diseases, lymphoma, and serious infections.  The patient understands that monitoring is required including a PPD at baseline and must alert us or the primary physician if symptoms of infection or other concerning signs are noted.
Cantharidin Pregnancy And Lactation Text: The use of this medication during pregnancy or lactation is not recommended as there is insufficient data.
Nsaids Counseling: NSAID Counseling: I discussed with the patient that NSAIDs should be taken with food. Prolonged use of NSAIDs can result in the development of stomach ulcers.  Patient advised to stop taking NSAIDs if abdominal pain occurs.  The patient verbalized understanding of the proper use and possible adverse effects of NSAIDs.  All of the patient's questions and concerns were addressed.
Acitretin Pregnancy And Lactation Text: This medication is Pregnancy Category X and should not be given to women who are pregnant or may become pregnant in the future. This medication is excreted in breast milk.
Eucrisa Counseling: Patient may experience a mild burning sensation during topical application. Eucrisa is not approved in children less than 2 years of age.
Clindamycin Counseling: I counseled the patient regarding use of clindamycin as an antibiotic for prophylactic and/or therapeutic purposes. Clindamycin is active against numerous classes of bacteria, including skin bacteria. Side effects may include nausea, diarrhea, gastrointestinal upset, rash, hives, yeast infections, and in rare cases, colitis.
Cyclophosphamide Counseling:  I discussed with the patient the risks of cyclophosphamide including but not limited to hair loss, hormonal abnormalities, decreased fertility, abdominal pain, diarrhea, nausea and vomiting, bone marrow suppression and infection. The patient understands that monitoring is required while taking this medication.
Terbinafine Pregnancy And Lactation Text: This medication is Pregnancy Category B and is considered safe during pregnancy. It is also excreted in breast milk and breast feeding isn't recommended.
Clofazimine Pregnancy And Lactation Text: This medication is Pregnancy Category C and isn't considered safe during pregnancy. It is excreted in breast milk.
Cimzia Pregnancy And Lactation Text: This medication crosses the placenta but can be considered safe in certain situations. Cimzia may be excreted in breast milk.
Azelaic Acid Counseling: Patient counseled that medicine may cause skin irritation and to avoid applying near the eyes.  In the event of skin irritation, the patient was advised to reduce the amount of the drug applied or use it less frequently.   The patient verbalized understanding of the proper use and possible adverse effects of azelaic acid.  All of the patient's questions and concerns were addressed.
Qbrexza Pregnancy And Lactation Text: There is no available data on Qbrexza use in pregnant women.  There is no available data on Qbrexza use in lactation.
Colchicine Counseling:  Patient counseled regarding adverse effects including but not limited to stomach upset (nausea, vomiting, stomach pain, or diarrhea).  Patient instructed to limit alcohol consumption while taking this medication.  Colchicine may reduce blood counts especially with prolonged use.  The patient understands that monitoring of kidney function and blood counts may be required, especially at baseline. The patient verbalized understanding of the proper use and possible adverse effects of colchicine.  All of the patient's questions and concerns were addressed.
Xeljanz Counseling: I discussed with the patient the risks of Xeljanz therapy including increased risk of infection, liver issues, headache, diarrhea, or cold symptoms. Live vaccines should be avoided. They were instructed to call if they have any problems.
Propranolol Counseling:  I discussed with the patient the risks of propranolol including but not limited to low heart rate, low blood pressure, low blood sugar, restlessness and increased cold sensitivity. They should call the office if they experience any of these side effects.
Cosentyx Counseling:  I discussed with the patient the risks of Cosentyx including but not limited to worsening of Crohn's disease, immunosuppression, allergic reactions and infections.  The patient understands that monitoring is required including a PPD at baseline and must alert us or the primary physician if symptoms of infection or other concerning signs are noted.
Topical Clindamycin Counseling: Patient counseled that this medication may cause skin irritation or allergic reactions.  In the event of skin irritation, the patient was advised to reduce the amount of the drug applied or use it less frequently.   The patient verbalized understanding of the proper use and possible adverse effects of clindamycin.  All of the patient's questions and concerns were addressed.
Bexarotene Counseling:  I discussed with the patient the risks of bexarotene including but not limited to hair loss, dry lips/skin/eyes, liver abnormalities, hyperlipidemia, pancreatitis, depression/suicidal ideation, photosensitivity, drug rash/allergic reactions, hypothyroidism, anemia, leukopenia, infection, cataracts, and teratogenicity.  Patient understands that they will need regular blood tests to check lipid profile, liver function tests, white blood cell count, thyroid function tests and pregnancy test if applicable.
Finasteride Pregnancy And Lactation Text: This medication is absolutely contraindicated during pregnancy. It is unknown if it is excreted in breast milk.
Nsaids Pregnancy And Lactation Text: These medications are considered safe up to 30 weeks gestation. It is excreted in breast milk.
Cyclophosphamide Pregnancy And Lactation Text: This medication is Pregnancy Category D and it isn't considered safe during pregnancy. This medication is excreted in breast milk.
Ivermectin Counseling:  Patient instructed to take medication on an empty stomach with a full glass of water.  Patient informed of potential adverse effects including but not limited to nausea, diarrhea, dizziness, itching, and swelling of the extremities or lymph nodes.  The patient verbalized understanding of the proper use and possible adverse effects of ivermectin.  All of the patient's questions and concerns were addressed.
Tranexamic Acid Counseling:  Patient advised of the small risk of bleeding problems with tranexamic acid. They were also instructed to call if they developed any nausea, vomiting or diarrhea. All of the patient's questions and concerns were addressed.
Clindamycin Pregnancy And Lactation Text: This medication can be used in pregnancy if certain situations. Clindamycin is also present in breast milk.
Gabapentin Counseling: I discussed with the patient the risks of gabapentin including but not limited to dizziness, somnolence, fatigue and ataxia.
Cimetidine Counseling:  I discussed with the patient the risks of Cimetidine including but not limited to gynecomastia, headache, diarrhea, nausea, drowsiness, arrhythmias, pancreatitis, skin rashes, psychosis, bone marrow suppression and kidney toxicity.
Mirvaso Counseling: Mirvaso is a topical medication which can decrease superficial blood flow where applied. Side effects are uncommon and include stinging, redness and allergic reactions.
Propranolol Pregnancy And Lactation Text: This medication is Pregnancy Category C and it isn't known if it is safe during pregnancy. It is excreted in breast milk.
Xelmuraliz Pregnancy And Lactation Text: This medication is Pregnancy Category D and is not considered safe during pregnancy.  The risk during breast feeding is also uncertain.
Zyclara Counseling:  I discussed with the patient the risks of imiquimod including but not limited to erythema, scaling, itching, weeping, crusting, and pain.  Patient understands that the inflammatory response to imiquimod is variable from person to person and was educated regarded proper titration schedule.  If flu-like symptoms develop, patient knows to discontinue the medication and contact us.
Quinolones Counseling:  I discussed with the patient the risks of fluoroquinolones including but not limited to GI upset, allergic reaction, drug rash, diarrhea, dizziness, photosensitivity, yeast infections, liver function test abnormalities, tendonitis/tendon rupture.
Olumiant Counseling: I discussed with the patient the risks of Olumiant therapy including but not limited to upper respiratory tract infections, shingles, cold sores, and nausea. Live vaccines should be avoided.  This medication has been linked to serious infections; higher rate of mortality; malignancy and lymphoproliferative disorders; major adverse cardiovascular events; thrombosis; gastrointestinal perforations; neutropenia; lymphopenia; anemia; liver enzyme elevations; and lipid elevations.
5-Fu Counseling: 5-Fluorouracil Counseling:  I discussed with the patient the risks of 5-fluorouracil including but not limited to erythema, scaling, itching, weeping, crusting, and pain.
Bexarotene Pregnancy And Lactation Text: This medication is Pregnancy Category X and should not be given to women who are pregnant or may become pregnant. This medication should not be used if you are breast feeding.
Odomzo Counseling- I discussed with the patient the risks of Odomzo including but not limited to nausea, vomiting, diarrhea, constipation, weight loss, changes in the sense of taste, decreased appetite, muscle spasms, and hair loss.  The patient verbalized understanding of the proper use and possible adverse effects of Odomzo.  All of the patient's questions and concerns were addressed.
Cyclosporine Counseling:  I discussed with the patient the risks of cyclosporine including but not limited to hypertension, gingival hyperplasia,myelosuppression, immunosuppression, liver damage, kidney damage, neurotoxicity, lymphoma, and serious infections. The patient understands that monitoring is required including baseline blood pressure, CBC, CMP, lipid panel and uric acid, and then 1-2 times monthly CMP and blood pressure.
Mirvaso Pregnancy And Lactation Text: This medication has not been assigned a Pregnancy Risk Category. It is unknown if the medication is excreted in breast milk.
Rhofade Counseling: Rhofade is a topical medication which can decrease superficial blood flow where applied. Side effects are uncommon and include stinging, redness and allergic reactions.
Skyrizi Counseling: I discussed with the patient the risks of risankizumab-rzaa including but not limited to immunosuppression, and serious infections.  The patient understands that monitoring is required including a PPD at baseline and must alert us or the primary physician if symptoms of infection or other concerning signs are noted.
Topical Ketoconazole Counseling: Patient counseled that this medication may cause skin irritation or allergic reactions.  In the event of skin irritation, the patient was advised to reduce the amount of the drug applied or use it less frequently.   The patient verbalized understanding of the proper use and possible adverse effects of ketoconazole.  All of the patient's questions and concerns were addressed.
Hydroquinone Counseling:  Patient advised that medication may result in skin irritation, lightening (hypopigmentation), dryness, and burning.  In the event of skin irritation, the patient was advised to reduce the amount of the drug applied or use it less frequently.  Rarely, spots that are treated with hydroquinone can become darker (pseudoochronosis).  Should this occur, patient instructed to stop medication and call the office. The patient verbalized understanding of the proper use and possible adverse effects of hydroquinone.  All of the patient's questions and concerns were addressed.
Doxycycline Counseling:  Patient counseled regarding possible photosensitivity and increased risk for sunburn.  Patient instructed to avoid sunlight, if possible.  When exposed to sunlight, patients should wear protective clothing, sunglasses, and sunscreen.  The patient was instructed to call the office immediately if the following severe adverse effects occur:  hearing changes, easy bruising/bleeding, severe headache, or vision changes.  The patient verbalized understanding of the proper use and possible adverse effects of doxycycline.  All of the patient's questions and concerns were addressed.
Olumiant Pregnancy And Lactation Text: Based on animal studies, Olumiant may cause embryo-fetal harm when administered to pregnant women.  The medication should not be used in pregnancy.  Breastfeeding is not recommended during treatment.
Tranexamic Acid Pregnancy And Lactation Text: It is unknown if this medication is safe during pregnancy or breast feeding.
Griseofulvin Counseling:  I discussed with the patient the risks of griseofulvin including but not limited to photosensitivity, cytopenia, liver damage, nausea/vomiting and severe allergy.  The patient understands that this medication is best absorbed when taken with a fatty meal (e.g., ice cream or french fries).
Birth Control Pills Counseling: Birth Control Pill Counseling: I discussed with the patient the potential side effects of OCPs including but not limited to increased risk of stroke, heart attack, thrombophlebitis, deep venous thrombosis, hepatic adenomas, breast changes, GI upset, headaches, and depression.  The patient verbalized understanding of the proper use and possible adverse effects of OCPs. All of the patient's questions and concerns were addressed.
Xolair Counseling:  Patient informed of potential adverse effects including but not limited to fever, muscle aches, rash and allergic reactions.  The patient verbalized understanding of the proper use and possible adverse effects of Xolair.  All of the patient's questions and concerns were addressed.
Benzoyl Peroxide Counseling: Patient counseled that medicine may cause skin irritation and bleach clothing.  In the event of skin irritation, the patient was advised to reduce the amount of the drug applied or use it less frequently.   The patient verbalized understanding of the proper use and possible adverse effects of benzoyl peroxide.  All of the patient's questions and concerns were addressed.
Isotretinoin Counseling: Patient should get monthly blood tests, not donate blood, not drive at night if vision affected, not share medication, and not undergo elective surgery for 6 months after tx completed. Side effects reviewed, pt to contact office should one occur.
Opzelura Counseling:  I discussed with the patient the risks of Opzelura including but not limited to nasopharngitis, bronchitis, ear infection, eosinophila, hives, diarrhea, folliculitis, tonsillitis, and rhinorrhea.  Taken orally, this medication has been linked to serious infections; higher rate of mortality; malignancy and lymphoproliferative disorders; major adverse cardiovascular events; thrombosis; thrombocytopenia, anemia, and neutropenia; and lipid elevations.
Rifampin Counseling: I discussed with the patient the risks of rifampin including but not limited to liver damage, kidney damage, red-orange body fluids, nausea/vomiting and severe allergy.
Dupixent Counseling: I discussed with the patient the risks of dupilumab including but not limited to eye infection and irritation, cold sores, injection site reactions, worsening of asthma, allergic reactions and increased risk of parasitic infection.  Live vaccines should be avoided while taking dupilumab. Dupilumab will also interact with certain medications such as warfarin and cyclosporine. The patient understands that monitoring is required and they must alert us or the primary physician if symptoms of infection or other concerning signs are noted.
Rinvoq Counseling: I discussed with the patient the risks of Rinvoq therapy including but not limited to upper respiratory tract infections, shingles, cold sores, bronchitis, nausea, cough, fever, acne, and headache. Live vaccines should be avoided.  This medication has been linked to serious infections; higher rate of mortality; malignancy and lymphoproliferative disorders; major adverse cardiovascular events; thrombosis; thrombocytopenia, anemia, and neutropenia; lipid elevations; liver enzyme elevations; and gastrointestinal perforations.
Valtrex Counseling: I discussed with the patient the risks of valacyclovir including but not limited to kidney damage, nausea, vomiting and severe allergy.  The patient understands that if the infection seems to be worsening or is not improving, they are to call.
Dapsone Counseling: I discussed with the patient the risks of dapsone including but not limited to hemolytic anemia, agranulocytosis, rashes, methemoglobinemia, kidney failure, peripheral neuropathy, headaches, GI upset, and liver toxicity.  Patients who start dapsone require monitoring including baseline LFTs and weekly CBCs for the first month, then every month thereafter.  The patient verbalized understanding of the proper use and possible adverse effects of dapsone.  All of the patient's questions and concerns were addressed.
Hydroxychloroquine Counseling:  I discussed with the patient that a baseline ophthalmologic exam is needed at the start of therapy and every year thereafter while on therapy. A CBC may also be warranted for monitoring.  The side effects of this medication were discussed with the patient, including but not limited to agranulocytosis, aplastic anemia, seizures, rashes, retinopathy, and liver toxicity. Patient instructed to call the office should any adverse effect occur.  The patient verbalized understanding of the proper use and possible adverse effects of Plaquenil.  All the patient's questions and concerns were addressed.
Birth Control Pills Pregnancy And Lactation Text: This medication should be avoided if pregnant and for the first 30 days post-partum.
Benzoyl Peroxide Pregnancy And Lactation Text: This medication is Pregnancy Category C. It is unknown if benzoyl peroxide is excreted in breast milk.
Glycopyrrolate Counseling:  I discussed with the patient the risks of glycopyrrolate including but not limited to skin rash, drowsiness, dry mouth, difficulty urinating, and blurred vision.
Azithromycin Counseling:  I discussed with the patient the risks of azithromycin including but not limited to GI upset, allergic reaction, drug rash, diarrhea, and yeast infections.
Opioid Counseling: I discussed with the patient the potential side effects of opioids including but not limited to addiction, altered mental status, and depression. I stressed avoiding alcohol, benzodiazepines, muscle relaxants and sleep aids unless specifically okayed by a physician. The patient verbalized understanding of the proper use and possible adverse effects of opioids. All of the patient's questions and concerns were addressed. They were instructed to flush the remaining pills down the toilet if they did not need them for pain.
Dupixent Pregnancy And Lactation Text: This medication likely crosses the placenta but the risk for the fetus is uncertain. This medication is excreted in breast milk.
Dapsone Pregnancy And Lactation Text: This medication is Pregnancy Category C and is not considered safe during pregnancy or breast feeding.
Solaraze Counseling:  I discussed with the patient the risks of Solaraze including but not limited to erythema, scaling, itching, weeping, crusting, and pain.
Griseofulvin Pregnancy And Lactation Text: This medication is Pregnancy Category X and is known to cause serious birth defects. It is unknown if this medication is excreted in breast milk but breast feeding should be avoided.
Stelara Counseling:  I discussed with the patient the risks of ustekinumab including but not limited to immunosuppression, malignancy, posterior leukoencephalopathy syndrome, and serious infections.  The patient understands that monitoring is required including a PPD at baseline and must alert us or the primary physician if symptoms of infection or other concerning signs are noted.
Drysol Counseling:  I discussed with the patient the risks of drysol/aluminum chloride including but not limited to skin rash, itching, irritation, burning.
Doxepin Counseling:  Patient advised that the medication is sedating and not to drive a car after taking this medication. Patient informed of potential adverse effects including but not limited to dry mouth, urinary retention, and blurry vision.  The patient verbalized understanding of the proper use and possible adverse effects of doxepin.  All of the patient's questions and concerns were addressed.
Oral Minoxidil Counseling- I discussed with the patient the risks of oral minoxidil including but not limited to shortness of breath, swelling of the feet or ankles, dizziness, lightheadedness, unwanted hair growth and allergic reaction.  The patient verbalized understanding of the proper use and possible adverse effects of oral minoxidil.  All of the patient's questions and concerns were addressed.
Doxycycline Pregnancy And Lactation Text: This medication is Pregnancy Category D and not consider safe during pregnancy. It is also excreted in breast milk but is considered safe for shorter treatment courses.
Methotrexate Counseling:  Patient counseled regarding adverse effects of methotrexate including but not limited to nausea, vomiting, abnormalities in liver function tests. Patients may develop mouth sores, rash, diarrhea, and abnormalities in blood counts. The patient understands that monitoring is required including LFT's and blood counts.  There is a rare possibility of scarring of the liver and lung problems that can occur when taking methotrexate. Persistent nausea, loss of appetite, pale stools, dark urine, cough, and shortness of breath should be reported immediately. Patient advised to discontinue methotrexate treatment at least three months before attempting to become pregnant.  I discussed the need for folate supplements while taking methotrexate.  These supplements can decrease side effects during methotrexate treatment. The patient verbalized understanding of the proper use and possible adverse effects of methotrexate.  All of the patient's questions and concerns were addressed.
Xolair Pregnancy And Lactation Text: This medication is Pregnancy Category B and is considered safe during pregnancy. This medication is excreted in breast milk.
Adbry Counseling: I discussed with the patient the risks of tralokinumab including but not limited to eye infection and irritation, cold sores, injection site reactions, worsening of asthma, allergic reactions and increased risk of parasitic infection.  Live vaccines should be avoided while taking tralokinumab. The patient understands that monitoring is required and they must alert us or the primary physician if symptoms of infection or other concerning signs are noted.
Hydroxychloroquine Pregnancy And Lactation Text: This medication has been shown to cause fetal harm but it isn't assigned a Pregnancy Risk Category. There are small amounts excreted in breast milk.
Valtrex Pregnancy And Lactation Text: this medication is Pregnancy Category B and is considered safe during pregnancy. This medication is not directly found in breast milk but it's metabolite acyclovir is present.
Topical Sulfur Applications Counseling: Topical Sulfur Counseling: Patient counseled that this medication may cause skin irritation or allergic reactions.  In the event of skin irritation, the patient was advised to reduce the amount of the drug applied or use it less frequently.   The patient verbalized understanding of the proper use and possible adverse effects of topical sulfur application.  All of the patient's questions and concerns were addressed.
Itraconazole Counseling:  I discussed with the patient the risks of itraconazole including but not limited to liver damage, nausea/vomiting, neuropathy, and severe allergy.  The patient understands that this medication is best absorbed when taken with acidic beverages such as non-diet cola or ginger ale.  The patient understands that monitoring is required including baseline LFTs and repeat LFTs at intervals.  The patient understands that they are to contact us or the primary physician if concerning signs are noted.
Isotretinoin Pregnancy And Lactation Text: This medication is Pregnancy Category X and is considered extremely dangerous during pregnancy. It is unknown if it is excreted in breast milk.
Imiquimod Counseling:  I discussed with the patient the risks of imiquimod including but not limited to erythema, scaling, itching, weeping, crusting, and pain.  Patient understands that the inflammatory response to imiquimod is variable from person to person and was educated regarded proper titration schedule.  If flu-like symptoms develop, patient knows to discontinue the medication and contact us.
Doxepin Pregnancy And Lactation Text: This medication is Pregnancy Category C and it isn't known if it is safe during pregnancy. It is also excreted in breast milk and breast feeding isn't recommended.
Erythromycin Counseling:  I discussed with the patient the risks of erythromycin including but not limited to GI upset, allergic reaction, drug rash, diarrhea, increase in liver enzymes, and yeast infections.
Rifampin Pregnancy And Lactation Text: This medication is Pregnancy Category C and it isn't know if it is safe during pregnancy. It is also excreted in breast milk and should not be used if you are breast feeding.
Opzelura Pregnancy And Lactation Text: There is insufficient data to evaluate drug-associated risk for major birth defects, miscarriage, or other adverse maternal or fetal outcomes.  There is a pregnancy registry that monitors pregnancy outcomes in pregnant persons exposed to the medication during pregnancy.  It is unknown if this medication is excreted in breast milk.  Do not breastfeed during treatment and for about 4 weeks after the last dose.
Rinvoq Pregnancy And Lactation Text: Based on animal studies, Rinvoq may cause embryo-fetal harm when administered to pregnant women.  The medication should not be used in pregnancy.  Breastfeeding is not recommended during treatment and for 6 days after the last dose.
Solaraze Pregnancy And Lactation Text: This medication is Pregnancy Category B and is considered safe. There is some data to suggest avoiding during the third trimester. It is unknown if this medication is excreted in breast milk.
Enbrel Counseling:  I discussed with the patient the risks of etanercept including but not limited to myelosuppression, immunosuppression, autoimmune hepatitis, demyelinating diseases, lymphoma, and infections.  The patient understands that monitoring is required including a PPD at baseline and must alert us or the primary physician if symptoms of infection or other concerning signs are noted.
Azithromycin Pregnancy And Lactation Text: This medication is considered safe during pregnancy and is also secreted in breast milk.
Methotrexate Pregnancy And Lactation Text: This medication is Pregnancy Category X and is known to cause fetal harm. This medication is excreted in breast milk.
Spironolactone Counseling: Patient advised regarding risks of diarrhea, abdominal pain, hyperkalemia, birth defects (for female patients), liver toxicity and renal toxicity. The patient may need blood work to monitor liver and kidney function and potassium levels while on therapy. The patient verbalized understanding of the proper use and possible adverse effects of spironolactone.  All of the patient's questions and concerns were addressed.
Sarecycline Counseling: Patient advised regarding possible photosensitivity and discoloration of the teeth, skin, lips, tongue and gums.  Patient instructed to avoid sunlight, if possible.  When exposed to sunlight, patients should wear protective clothing, sunglasses, and sunscreen.  The patient was instructed to call the office immediately if the following severe adverse effects occur:  hearing changes, easy bruising/bleeding, severe headache, or vision changes.  The patient verbalized understanding of the proper use and possible adverse effects of sarecycline.  All of the patient's questions and concerns were addressed.
Azathioprine Counseling:  I discussed with the patient the risks of azathioprine including but not limited to myelosuppression, immunosuppression, hepatotoxicity, lymphoma, and infections.  The patient understands that monitoring is required including baseline LFTs, Creatinine, possible TPMP genotyping and weekly CBCs for the first month and then every 2 weeks thereafter.  The patient verbalized understanding of the proper use and possible adverse effects of azathioprine.  All of the patient's questions and concerns were addressed.
Carac Counseling:  I discussed with the patient the risks of Carac including but not limited to erythema, scaling, itching, weeping, crusting, and pain.
Oral Minoxidil Pregnancy And Lactation Text: This medication should only be used when clearly needed if you are pregnant, attempting to become pregnant or breast feeding.
High Dose Vitamin A Counseling: Side effects reviewed, pt to contact office should one occur.
Glycopyrrolate Pregnancy And Lactation Text: This medication is Pregnancy Category B and is considered safe during pregnancy. It is unknown if it is excreted breast milk.
Opioid Pregnancy And Lactation Text: These medications can lead to premature delivery and should be avoided during pregnancy. These medications are also present in breast milk in small amounts.
Dutasteride Male Counseling: Dustasteride Counseling:  I discussed with the patient the risks of use of dutasteride including but not limited to decreased libido, decreased ejaculate volume, and gynecomastia. Women who can become pregnant should not handle medication.  All of the patient's questions and concerns were addressed.

## 2022-08-25 ENCOUNTER — TELEPHONE (OUTPATIENT)
Dept: MEDICAL GROUP | Facility: LAB | Age: 67
End: 2022-08-25
Payer: MEDICARE

## 2022-08-25 ENCOUNTER — PATIENT MESSAGE (OUTPATIENT)
Dept: LAB | Facility: MEDICAL CENTER | Age: 67
End: 2022-08-25
Payer: MEDICARE

## 2022-08-25 DIAGNOSIS — M53.3 CHRONIC LEFT SI JOINT PAIN: ICD-10-CM

## 2022-08-25 DIAGNOSIS — K94.13: ICD-10-CM

## 2022-08-25 DIAGNOSIS — G89.29 CHRONIC LEFT SI JOINT PAIN: ICD-10-CM

## 2022-08-25 DIAGNOSIS — Z78.0 MENOPAUSE: ICD-10-CM

## 2022-08-25 DIAGNOSIS — K50.019 CROHN'S DISEASE OF SMALL INTESTINE WITH COMPLICATION (HCC): ICD-10-CM

## 2022-08-25 NOTE — TELEPHONE ENCOUNTER
Jana's referral to the OB GYN was denied by her insurance, she needs to be referred to another office.

## 2022-08-26 ENCOUNTER — OFFICE VISIT (OUTPATIENT)
Dept: MEDICAL GROUP | Facility: LAB | Age: 67
End: 2022-08-26
Payer: MEDICARE

## 2022-08-26 VITALS
DIASTOLIC BLOOD PRESSURE: 80 MMHG | HEIGHT: 72 IN | BODY MASS INDEX: 21.77 KG/M2 | OXYGEN SATURATION: 93 % | HEART RATE: 86 BPM | TEMPERATURE: 97.5 F | SYSTOLIC BLOOD PRESSURE: 130 MMHG

## 2022-08-26 DIAGNOSIS — M32.9 SYSTEMIC LUPUS ERYTHEMATOSUS, UNSPECIFIED SLE TYPE, UNSPECIFIED ORGAN INVOLVEMENT STATUS (HCC): ICD-10-CM

## 2022-08-26 DIAGNOSIS — Z93.2 ILEOSTOMY IN PLACE (HCC): ICD-10-CM

## 2022-08-26 DIAGNOSIS — R11.14 BILIOUS VOMITING WITH NAUSEA: ICD-10-CM

## 2022-08-26 DIAGNOSIS — K50.019 CROHN'S DISEASE OF SMALL INTESTINE WITH COMPLICATION (HCC): ICD-10-CM

## 2022-08-26 PROCEDURE — 99214 OFFICE O/P EST MOD 30 MIN: CPT | Performed by: INTERNAL MEDICINE

## 2022-08-26 RX ORDER — GRANISETRON HYDROCHLORIDE 1 MG/1
1 TABLET, FILM COATED ORAL EVERY 12 HOURS PRN
Qty: 10 TABLET | Refills: 0 | Status: SHIPPED | OUTPATIENT
Start: 2022-08-26 | End: 2023-01-24 | Stop reason: SDUPTHER

## 2022-08-26 RX ORDER — GRANISETRON HYDROCHLORIDE 1 MG/ML
1 INJECTION INTRAVENOUS ONCE
COMMUNITY
End: 2022-08-26

## 2022-08-26 NOTE — PROGRESS NOTES
CC: Jana Overton is a 67 y.o. female is suffering from   Chief Complaint   Patient presents with    Follow-Up    Immunizations     Shingles?    Extremity Weakness     In legs    Pain    Dizziness         SUBJECTIVE:  1. Bilious vomiting with nausea  Jana is here for follow-up, is having problems with vomiting, feels as though she is having problems with a bacterial overgrowth associated with bowel.  Patient suffers from Crohn's disease, we have discussed the use of rifaximin, prescription written    2. Systemic lupus erythematosus, unspecified SLE type, unspecified organ involvement status (HCC)  Patient is undergone evaluation by rheumatology, feel fairly confident the patient is dealing with systemic lupus erythematosus.    3. Crohn's disease of small intestine with complication (HCC)  Documented Crohn's disease of the small bowel status post diverting ileostomy    4. Ileostomy in place (HCC)  Ileostomy in place.  Because of patient's abdominal pain and discomfort she is asked that I dictate a letter so the ER doctors understand that she is being sent by me should the case arise.        Past social, family, history: , Goyo, very supportive  Social History     Tobacco Use    Smoking status: Never    Smokeless tobacco: Never    Tobacco comments:     second hand smoke parents - smoked for only 1 year many years ago   Vaping Use    Vaping Use: Never used   Substance Use Topics    Alcohol use: Not Currently     Alcohol/week: 0.6 oz     Types: 1 Shots of liquor per week     Comment: gin and half a lime; tonic water    Drug use: Not Currently     Types: Marijuana, Inhaled     Comment: medical marijuana through bong         MEDICATIONS:    Current Outpatient Medications:     granisetron (KYTRIL) 1 MG Tab, Take 1 Tablet by mouth every 12 hours as needed for Nausea/Vomiting., Disp: 10 Tablet, Rfl: 0    riFAXIMin (XIFAXAN) 200 MG Tab, Take 1 Tablet by mouth 3 times a day., Disp: 42 Tablet, Rfl: 0     hydroxychloroquine (PLAQUENIL) 200 MG Tab, Take 1.5 Tablets by mouth every day., Disp: 135 Tablet, Rfl: 3    traZODone (DESYREL) 50 MG Tab, , Disp: , Rfl:     naratriptan (AMERGE) 2.5 MG tablet, Take 1 Tablet by mouth as needed for Migraine., Disp: 5 Tablet, Rfl: 3    hydrALAZINE (APRESOLINE) 10 MG Tab, Take 1 Tablet by mouth 3 times a day as needed (systolic blood pressure >150.)., Disp: 270 Tablet, Rfl: 3    furosemide (LASIX) 20 MG Tab, Take 1 Tablet by mouth 1 time a day as needed (leg swelling/weight gain)., Disp: 90 Tablet, Rfl: 3    potassium chloride SA (KDUR) 20 MEQ Tab CR, Take 1 Tablet by mouth 1 time a day as needed (when you take lasix.)., Disp: 90 Tablet, Rfl: 3    ondansetron (ZOFRAN ODT) 4 MG TABLET DISPERSIBLE, Take 1 Tablet by mouth every 6 hours as needed for Nausea., Disp: 20 Tablet, Rfl: 0    metoprolol tartrate (LOPRESSOR) 50 MG Tab, TAKE 1 TABLET BY MOUTH TWICE DAILY, HOLD IF BLOOD PRESSURE LESS THAN 95/50 (Patient taking differently: Take 50 mg by mouth 2 times a day. TAKE 1 TABLET BY MOUTH TWICE DAILY, HOLD IF BLOOD PRESSURE LESS THAN 95/50), Disp: 180 Tablet, Rfl: 0    prochlorperazine (COMPAZINE) 10 MG Tab, Take 1 Tablet by mouth 1 time a day as needed. (Patient taking differently: Take 10 mg by mouth 1 time a day as needed for Nausea/Vomiting.), Disp: 10 Tablet, Rfl: 0    oxyCODONE immediate release (ROXICODONE) 10 MG immediate release tablet, Take 10 mg by mouth every four hours as needed for Severe Pain. 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    Acute encephalopathy 05/21/2022    Acute cystitis without hematuria 12/07/2021    Ureteric stone 12/07/2021    Migraine 06/04/2019    FLORES (acute kidney injury) ([facility]) 06/01/2019    Essential hypertension 05/20/2019    SIRS (systemic inflammatory response syndrome) ([facility]) 05/19/2019    High anion gap metabolic acidosis 05/19/2019    DVT (deep venous thrombosis) ([facility]) 12/31/2018    History of MRSA infection 12/29/2018    History of infection with vancomycin resistant Enterococcus (VRE) 12/29/2018    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[facility]) 12/28/2018    Dysphasia 12/28/2018    Anemia 12/15/2018    Thrush of mouth and esophagus ([facility]) 12/13/2018    Hypokalemia 12/13/2018    Liver enzyme elevation 12/12/2018    Leukocytosis 12/12/2018    Chronic respiratory failure with hypoxia ([facility]) 03/20/2018    Anxiety disorder due to multiple medical problems 12/01/2017    DDD (degenerative disc disease), lumbar 04/12/2017    History of DVT (deep vein thrombosis) 11/23/2016    Paroxysmal atrial fibrillation ([facility]) 03/26/2015    Sleep apnea 10/26/2014    Postherpetic neuralgia 06/24/2014    Multiple falls 06/24/2014    COPD (chronic obstructive pulmonary disease) ([facility]) 06/24/2014    Rosacea 06/24/2014    Ileostomy in place ([facility]) 06/26/2013    GERD (gastroesophageal reflux disease) 12/05/2012    Status post lumbar surgery 07/16/2012    Crohn's disease of small intestine with complication ([facility]) 09/23/2009    Central sensitization to pain 09/23/2009    Opioid type dependence, continuous (CMS-HCC) 09/23/2009    Degenerative joint disease of cervical and lumbar spine 09/23/2009       REVIEW OF SYSTEMS:  Gen.:  No Nausea, Vomiting, fever, Chills.  Heart: No chest pain.  Lungs:  No shortness of Breath.  Psychological: Rg unusual Anxiety depression     PHYSICAL EXAM   Constitutional: Alert, cooperative, not in acute distress.  Cardiovascular:  Rate Rhythm is regular without murmurs  rubs clicks.     Thorax & Lungs: Clear to auscultation, no wheezing, rhonchi, or rales  HENT: Normocephalic, Atraumatic.  Eyes: PERRLA, EOMI, Conjunctiva normal.   Neck: Trachia is midline no swelling of the thyroid.   Lymphatic: No lymphadenopathy noted.   Neurologic: Alert & oriented x 3, cranial nerves II through XII are intact, Normal motor function, Normal sensory function, No focal deficits noted.   Psychiatric: Affect normal, Judgment normal, Mood normal.     VITAL SIGNS:/80   Pulse 86   Temp 36.4 °C (97.5 °F) (Temporal)   Ht 1.829 m (6')   SpO2 93%   BMI 21.77 kg/m²     Labs: Reviewed    Assessment:                                                     Plan:    1. Bilious vomiting with nausea  Bilious vomiting with severe abdominal pain, continue granisetron, start rifaximin  - granisetron (KYTRIL) 1 MG Tab; Take 1 Tablet by mouth every 12 hours as needed for Nausea/Vomiting.  Dispense: 10 Tablet; Refill: 0  - riFAXIMin (XIFAXAN) 200 MG Tab; Take 1 Tablet by mouth 3 times a day.  Dispense: 42 Tablet; Refill: 0    2. Systemic lupus erythematosus, unspecified SLE type, unspecified organ involvement status ([facility])  Continued follow-up with rheumatology patient has been started on hydroxychloroquine referral written to ophthalmology  - Referral to Ophthalmology  - riFAXIMin (XIFAXAN) 200 MG Tab; Take 1 Tablet by mouth 3 times a day.  Dispense: 42 Tablet; Refill: 0    3. Crohn's disease of small intestine with complication ([facility])  No change in medical therapy  - riFAXIMin (XIFAXAN) 200 MG Tab; Take 1 Tablet by mouth 3 times a day.  Dispense: 42 Tablet; Refill: 0    4. Ileostomy in place ([facility])  Letter dictated for the patient  - riFAXIMin (XIFAXAN) 200 MG Tab; Take 1 Tablet by mouth 3 times a day.  Dispense: 42 Tablet; Refill: 0

## 2022-08-26 NOTE — LETTER
August 26, 2022        Patient: Jana Overton   YOB: 1955   Date of Visit: 8/26/2022           To Whom It May Concern:    It is my medical opinion that Jana Overton is suffering from moderate to severe GI distress and may need evaluation this weekend in the ER.    If you have any questions or concerns, please don't hesitate to call.        Sincerely,          Chase Charles D.O.  Electronically Signed    [facility] ([facility]  [id number] [place] [address] [id number] [phone] (Phone)  [phone] (Fax)

## 2022-08-29 ENCOUNTER — TELEPHONE (OUTPATIENT)
Dept: MEDICAL GROUP | Facility: LAB | Age: 67
End: 2022-08-29
Payer: MEDICARE

## 2022-08-31 ENCOUNTER — TELEPHONE (OUTPATIENT)
Dept: MEDICAL GROUP | Facility: LAB | Age: 67
End: 2022-08-31
Payer: MEDICARE

## 2022-08-31 NOTE — TELEPHONE ENCOUNTER
Jana called and said she is supposed to leave for a memorial service in [place] tomorrow but she is not doing well at all.  She said the last time she felt like this she got a toradol shot to help. She wants to know if that is something we can do for her.

## 2022-08-31 NOTE — TELEPHONE ENCOUNTER
Afua:  Please call Jana, she is requesting a Toradol shot, it is listed as an allergy.  I will write the order if she requests.  Please set her up as a MA visit.   Regards, Chase Charles, DO

## 2022-09-01 ENCOUNTER — PATIENT MESSAGE (OUTPATIENT)
Dept: LAB | Facility: MEDICAL CENTER | Age: 67
End: 2022-09-01
Payer: MEDICARE

## 2022-09-01 DIAGNOSIS — H53.9 VISION CHANGES: ICD-10-CM

## 2022-09-01 DIAGNOSIS — M54.2 NECK PAIN: ICD-10-CM

## 2022-09-01 DIAGNOSIS — M62.830 BACK SPASM: ICD-10-CM

## 2022-09-01 RX ORDER — KETOROLAC TROMETHAMINE 30 MG/ML
30 INJECTION, SOLUTION INTRAMUSCULAR; INTRAVENOUS ONCE
Qty: 1 ML | Refills: 0 | Status: SHIPPED | OUTPATIENT
Start: 2022-09-01 | End: 2022-09-01

## 2022-09-06 ENCOUNTER — OFFICE VISIT (OUTPATIENT)
Dept: CARDIOLOGY | Facility: MEDICAL CENTER | Age: 67
End: 2022-09-06
Payer: MEDICARE

## 2022-09-06 VITALS
RESPIRATION RATE: 16 BRPM | OXYGEN SATURATION: 94 % | DIASTOLIC BLOOD PRESSURE: 64 MMHG | HEART RATE: 78 BPM | HEIGHT: 72 IN | SYSTOLIC BLOOD PRESSURE: 130 MMHG | WEIGHT: 159 LBS | BODY MASS INDEX: 21.54 KG/M2

## 2022-09-06 DIAGNOSIS — N17.9 AKI (ACUTE KIDNEY INJURY) (HCC): ICD-10-CM

## 2022-09-06 DIAGNOSIS — M79.89 LEG SWELLING: ICD-10-CM

## 2022-09-06 DIAGNOSIS — J96.11 CHRONIC RESPIRATORY FAILURE WITH HYPOXIA (HCC): ICD-10-CM

## 2022-09-06 DIAGNOSIS — N18.32 STAGE 3B CHRONIC KIDNEY DISEASE: ICD-10-CM

## 2022-09-06 PROCEDURE — 99215 OFFICE O/P EST HI 40 MIN: CPT | Performed by: INTERNAL MEDICINE

## 2022-09-06 RX ORDER — SPIRONOLACTONE 25 MG/1
12.5 TABLET ORAL DAILY
Qty: 45 TABLET | Refills: 3 | Status: SHIPPED
Start: 2022-09-06 | End: 2022-10-21

## 2022-09-06 ASSESSMENT — FIBROSIS 4 INDEX: FIB4 SCORE: 2

## 2022-09-06 NOTE — PATIENT INSTRUCTIONS
Please get non-fasting blood tests when you can.     Please start spironolactone 12.5mg once a day to help with blood pressure and leg swelling.

## 2022-09-06 NOTE — PROGRESS NOTES
Cardiology Follow-up Consultation Note    Date of note:  9/6/2022  Primary Care Provider: Amanda Bazzi M.D.    Name:             Jana Overton   YOB: 1955  MRN:               [id number]    CC: palpitations.      Patient ID/HPI:   Jana Overton is a 67 y.o. female whose current medical problems include hypertension,  DVT, parosxysmal atrial fibrillation, TRUDY, COPD, and crohn's disease who is here for follow-up.     Clinic visit: 12/27/2017, at that time:     She also has moderate severity left sided chest tightness which radiates down her left arm. This is not necessarily associated with exertion.  She has been told to go to the emergency room multiple times for evaluation, however she continues to refuse as she's scared of getting inadequate treatment there.     SBP at home occasionally down to 70s/40s, HR up to 160s. + LE edema     She is currently not taking metoprolol or lasix. She does not take aspirin daily as it causes severe burning in her stomach.    Does see a pain specialist Dr. Telles and has been working on down-titrating opiates.     At our clinic visit: 12/29/2017  She still has chest pain, occasionally pressure-like at rest, resolves within minutes, also has sharp chest pain at times, associated with movement.     At our visit, 1/24/2018:  Pulse does go very high with exercise up to 140s with very mild exercise. Ziopatch showed periodic SVT and likely atrial fibrillation.  Still having palpitation daily.  No passing out, no falls.     In terms of TRUDY, she started her CPAP and feels much better in terms of her energy level and concentration.     In terms of her hypertension, 90s-100s/60s. Highest is 140s/90s.     Taking torsemide once a week along with potassium.     Still having moderate severity right sided chest pain associated with palpitations and ear pounding.     At our clinic visit, 5/30/2019:  2 days of drenching sweats, severe fatigue yesterday. Associated with  palpitations.  Up to taking metoprolol 25mg four times a day. Usual sitting pulse is 60, last was in the 100s.     Has also had nausea and anorexia and likely crohn's flare. Followed by Dr. Mahan and Dr. Sepulveda.      BP at home in the 110s-160s.  No syncope.     Currently with minimal cardiac symptoms, continues to have abdominal pain.     At our visit, 8/25/2020:  Had multiple crohn's flares and heat stroke since our last visit. Currently feeling well.      Just weaned herself off oxycodone.     In terms of a fib, since she's feeling well, no a fib recently.     At our visit, 5/27/2021:  When she climbs at elevation, she does get some chest tightness that resolves with rest and is associated with dizziness. When swimming she does ok.     Hypertension well controlled.     Does have palpitations and orthostatic dizziness at times.     At our visit, 6/7/2022:  Admitted 6/2/2022 for n/v and FLORES. She left AMA. This was her third hospitalization in the last month for similar compliants. Before that hospitalized for kidney stones back in December. She left AMA when she did not get IV diluadid per notes.  Weight up 20 pounds in the hospital.     Here with her  Goyo.     Pulse is in the 90s-120s. FLORES continues. Continues to have worsening diarrhea. COVID and flu were negative. Worried because home Bps are in the 150s.     Interim History:  Worsening chrons, frequent ER visits still and continued nausea and vomiting after every meal.     Worsening LE swelling, refractory to lasix. Takes this only 1-2 times a  week. Kcl pills do not get absorbed, her capsules are working. Can't tolerate (due to throat pain) the powder unfortunately even when this is in liquid.     Has not needed prn hydralazine.     Palpitations when her HR is above 100. Does exercise a lot still when not having flares, walks and swims and really enjoys this.       ROS    No hematemasis.   + difficulty peeing.   No fevers.     No CVA symptoms.      Past Medical History:   Diagnosis Date    Anesthesia     difficult Iv stick    Anxiety     Arthritis     all over    ASTHMA     Atrial fib/flut     Backpain     Bronchitis     5 years    Chronic pain     Colostomy in place ([facility]) 10/14/2010    COPD (chronic obstructive pulmonary disease) ([facility]) 6/24/2014    COPD (chronic obstructive pulmonary disease) ([facility]) 6/24/2014    Crohn's disease of colon ([facility])     Dyspnea     History of cardiac murmur     Hypokalemia 6/24/2014    Ileostomy present ([facility])     Infectious disease     MRSA, VancoRSA    Multiple falls 6/24/2014    Narcotic dependence ([facility]) 9/23/2009    Obstruction     Other specified disorder of intestines     crohns disease    Pain 7/11/13    all over    Pneumonia     child and mid 30's    Postherpetic neuralgia 6/24/2014    Rosacea 6/24/2014    Sleep apnea          Past Surgical History:   Procedure Laterality Date    KS CYSTOSCOPY,INSERT URETERAL STENT Right 12/23/2021    Procedure: CYSTOSCOPY, WITH URETERAL STENT EXCHANGE;  Surgeon: Jonathan Turcios M.D.;  Location: [facility];  Service: Urology    KS CYSTO/URETERO/PYELOSCOPY, DX Right 12/23/2021    Procedure: URETEROSCOPY;  Surgeon: Jonathan Turcios M.D.;  Location: [facility];  Service: Urology    KS CYSTO/URETERO/PYELOSCOPY, DX Right 12/8/2021    Procedure: URETEROSCOPY;  Surgeon: Luc Hook M.D.;  Location: [facility];  Service: Urology    CYSTO STENT PLACEMNT PRE SURG Right 12/8/2021    Procedure: CYSTOSCOPY, WITH URETERAL STENT INSERTION;  Surgeon: Luc Hook M.D.;  Location: [facility];  Service: Urology    ILEOSTOMY  1/20/2021    Procedure: ILEOSTOMY- COMPLEX REVISION,;  Surgeon: Kevin Cruz M.D.;  Location: [facility];  Service: General    LYSIS ADHESIONS GENERAL  1/20/2021    Procedure: LYSIS, ADHESIONS;  Surgeon: Kevin Cruz M.D.;  Location: [facility];  Service: General    GASTROSCOPY N/A 5/22/2019    Procedure: GASTROSCOPY -  WITH DILATION;  Surgeon: Dionicio Cadrona M.D.;  Location: SURGERY [facility];  Service: Gastroenterology    GASTROSCOPY  1/6/2019    Procedure: GASTROSCOPY- WITH DILATION;  Surgeon: Daniel Daniels M.D.;  Location: SURGERY [facility];  Service: Gastroenterology    ILEOSTOMY  12/29/2018    Procedure: ILEOSTOMY- REVISION;  Surgeon: Kevin Cruz M.D.;  Location: SURGERY [facility];  Service: General    SIGMOIDOSCOPY FLEX  12/29/2018    Procedure: SIGMOIDOSCOPY FLEX;  Surgeon: Kevin Cruz M.D.;  Location: SURGERY [facility];  Service: General    EXPLORATORY LAPAROTOMY  12/29/2018    Procedure: EXPLORATORY LAPAROTOMY, lysis of adhesions;  Surgeon: Kevin Cruz M.D.;  Location: SURGERY [facility];  Service: General    ILEOSTOMY  5/14/2014    Performed by Kevin Cruz M.D. at SURGERY [facility]    MAMMOPLASTY REDUCTION  7/17/2013    Performed by Janey Burt M.D. at [facility] ORS    HARDWARE REMOVAL NEURO  7/16/2012    Performed by KEELEY KIM at [facility] ORS    GASTROSCOPY  10/4/2011    Performed by TYSON MARTINEZ at SURGERY SAME DAY [facility] ORS    COLONOSCOPY  10/4/2011    Performed by TYSON MARTINEZ at [facility]GERY SAME DAY [facility] ORS    DILATION AND CURETTAGE  9/24/2010    Performed by GAURAV ALY at SURGERY SAME DAY [facility] ORS    ILEOSTOMY  11/11/2009    Performed by KEVIN CRUZ at [facility]    GASTROSCOPY WITH BIOPSY  11/22/08    Performed by NEGRITA HUYNH at [facility]    ABDOMINAL EXPLORATION      CERVICAL DISK AND FUSION ANTERIOR      COLON RESECTION      FOOT SURGERY      HAND SURGERY      LUMBAR FUSION ANTERIOR      LUMPECTOMY      OTHER ABDOMINAL SURGERY      surgery for chrons disease    NM BREAST REDUCTION           Current Outpatient Medications   Medication Sig Dispense Refill    metoprolol tartrate (LOPRESSOR) 50 MG Tab TAKE ONE TABLET BY MOUTH TWICE A DAY **HOLD IF BLOOD PRESSURE  LESS THAN 95/50** 180 Tablet 3    granisetron (KYTRIL) 1 MG Tab Take 1 Tablet by mouth every 12 hours as needed for Nausea/Vomiting. 10 Tablet 0    riFAXIMin (XIFAXAN) 200 MG Tab Take 1 Tablet by mouth 3 times a day. 42 Tablet 0    hydroxychloroquine (PLAQUENIL) 200 MG Tab Take 1.5 Tablets by mouth every day. 135 Tablet 3    traZODone (DESYREL) 50 MG Tab       naratriptan (AMERGE) 2.5 MG tablet Take 1 Tablet by mouth as needed for Migraine. 5 Tablet 3    hydrALAZINE (APRESOLINE) 10 MG Tab Take 1 Tablet by mouth 3 times a day as needed (systolic blood pressure >150.). 270 Tablet 3    furosemide (LASIX) 20 MG Tab Take 1 Tablet by mouth 1 time a day as needed (leg swelling/weight gain). 90 Tablet 3    potassium chloride SA (KDUR) 20 MEQ Tab CR Take 1 Tablet by mouth 1 time a day as needed (when you take lasix.). 90 Tablet 3    ondansetron (ZOFRAN ODT) 4 MG TABLET DISPERSIBLE Take 1 Tablet by mouth every 6 hours as needed for Nausea. 20 Tablet 0    prochlorperazine (COMPAZINE) 10 MG Tab Take 1 Tablet by mouth 1 time a day as needed. (Patient taking differently: Take 10 mg by mouth 1 time a day as needed for Nausea/Vomiting.) 10 Tablet 0    oxyCODONE immediate release (ROXICODONE) 10 MG immediate release tablet Take 10 mg by mouth every four hours as needed for Severe Pain. Indications: Chronic Pain      Probiotic Product (PROBIOTIC PO) Take 2 Capsules by mouth every day.       No current facility-administered medications for this visit.         Allergies   Allergen Reactions    Azathioprine Sodium Hives and Shortness of Breath    Infliximab Hives, Shortness of Breath and Rash     .    Methotrexate Hives, Shortness of Breath and Rash     .    Methotrexate Hives, Rash and Shortness of Breath     .    Morphine Shortness of Breath, Swelling and Palpitations    Promethazine Shortness of Breath and Rash     .    Roxanol [Morphine Sulfate] Swelling     Throat swells    Amitriptyline Unspecified     Nightmares      Carafate  "[Sucralfate] Vomiting and Nausea     Generalizes/Mouth Sores    Demerol Vomiting    Fentanyl Unspecified     Patches caused skin breakdown, no pain relief    Fentanyl      \"Patches didn't work\"  Skin broke down    Lorazepam Unspecified     States give me nightmares.     Meperidine Vomiting    Methadone Unspecified     Didn't work    Other Drug Unspecified     steroids    Pregabalin Rash     Rash      Pseudoephedrine Vomiting    Sulfa Drugs Hives and Rash     .  .    Ketorolac Tromethamine     Betamethasone Unspecified     Pt unable to remember    Celestone [Betamethasone Sodium Phosphate] Unspecified     Pt unable to remember    Sulfasalazine Rash     On chest         Family History   Problem Relation Age of Onset    Lung Disease Mother         copd    Diabetes Father     Heart Disease Father     Diabetes Sister     Cancer Maternal Aunt 40        breast         Social History     Socioeconomic History    Marital status:      Spouse name: Not on file    Number of children: Not on file    Years of education: Not on file    Highest education level: Associate degree: academic program   Occupational History    Not on file   Tobacco Use    Smoking status: Never    Smokeless tobacco: Never    Tobacco comments:     second hand smoke parents - smoked for only 1 year many years ago   Vaping Use    Vaping Use: Never used   Substance and Sexual Activity    Alcohol use: Not Currently     Alcohol/week: 0.6 oz     Types: 1 Shots of liquor per week     Comment: gin and half a lime; tonic water    Drug use: Yes     Types: Marijuana, Inhaled     Comment: medical marijuana through bong/vape    Sexual activity: Not on file     Comment:    Other Topics Concern    Not on file   Social History Narrative    Not on file     Social Determinants of Health     Financial Resource Strain: Low Risk     Difficulty of Paying Living Expenses: Not very hard   Food Insecurity: No Food Insecurity    Worried About Running Out of Food in " the Last Year: Never true    Ran Out of Food in the Last Year: Never true   Transportation Needs: No Transportation Needs    Lack of Transportation (Medical): No    Lack of Transportation (Non-Medical): No   Physical Activity: Sufficiently Active    Days of Exercise per Week: 7 days    Minutes of Exercise per Session: 60 min   Stress: No Stress Concern Present    Feeling of Stress : Not at all   Social Connections: Socially Integrated    Frequency of Communication with Friends and Family: More than three times a week    Frequency of Social Gatherings with Friends and Family: Once a week    Attends [employer] Services: More than 4 times per year    Active Member of Clubs or Organizations: Yes    Attends Club or Organization Meetings: More than 4 times per year    Marital Status:    Intimate Partner Violence: Not on file   Housing Stability: Low Risk     Unable to Pay for Housing in the Last Year: No    Number of Places Lived in the Last Year: 1    Unstable Housing in the Last Year: No         Physical Exam:  Ambulatory Vitals  /64 (BP Location: Left arm, Patient Position: Sitting, BP Cuff Size: Adult)   Pulse 78   Resp 16   Ht 1.829 m (6')   Wt 72.1 kg (159 lb)   SpO2 94%    Oxygen Therapy:  Pulse Oximetry: 94 %  BP Readings from Last 4 Encounters:   09/06/22 130/64   08/26/22 130/80   08/06/22 120/60   07/27/22 130/60       Weight/BMI: Body mass index is 21.56 kg/m².  Wt Readings from Last 4 Encounters:   09/06/22 72.1 kg (159 lb)   08/05/22 72.8 kg (160 lb 7.9 oz)   07/27/22 71.2 kg (157 lb)   07/22/22 70.6 kg (155 lb 9.6 oz)         General: in severe abdominal pain.   Eyes: nl conjunctiva  ENT: OP covered by mask  Neck: JVP <8 cm H2O  Lungs: normal respiratory effort, CTAB  Heart: RRR, no murmurs, no rubs or gallops, trace edema bilateral lower extremities. No LV/RV heave on cardiac palpatation. 2+ bilateral radial pulses.     Abdomen: did not examine due to tenderness.   Extremities/MSK: no  clubbing, no cyanosis  Neurological: No focal sensory deficits  Psychiatric: Appropriate affect, A/O x 3, intact judgement and insight  Skin: Warm extremities    Exam repeated in full and unchanged except for as noted above.        Lab Data Review:  Lab Results   Component Value Date/Time    CHOLSTRLTOT 194 05/23/2022 02:36 AM    LDL 90 05/23/2022 02:36 AM    HDL 81 05/23/2022 02:36 AM    TRIGLYCERIDE 113 05/23/2022 02:36 AM       Lab Results   Component Value Date/Time    SODIUM 136 08/05/2022 08:42 PM    POTASSIUM 3.5 (L) 08/05/2022 08:42 PM    CHLORIDE 97 08/05/2022 08:42 PM    CO2 25 08/05/2022 08:42 PM    GLUCOSE 106 (H) 08/05/2022 08:42 PM    BUN 19 08/05/2022 08:42 PM    CREATININE 1.53 (H) 08/05/2022 08:42 PM    CREATININE 0.89 02/10/2010 04:04 PM    BUNCREATRAT 17 02/10/2010 04:04 PM    GLOMRATE >59 02/10/2010 04:04 PM     Lab Results   Component Value Date/Time    ALKPHOSPHAT 118 (H) 08/05/2022 08:42 PM    ASTSGOT 23 08/05/2022 08:42 PM    ALTSGPT 18 08/05/2022 08:42 PM    TBILIRUBIN 0.2 08/05/2022 08:42 PM      Lab Results   Component Value Date/Time    WBC 8.5 08/05/2022 08:42 PM    WBC 7.9 02/10/2010 04:04 PM     Lab Results   Component Value Date/Time    HBA1C 5.3 08/27/2015 01:50 PM     No components found for: TROP          Cardiac Imaging and Procedures Review:    EKG dated 12/26/2017 : My personal interpretation is NSR, LAE, RsR' in V1/V2       Ziopatch 12/2020:  CONCLUSIONS:   * 13 days and 9 hours of ziopatch recordings reviewed.   * Most symptoms associated with sinus rhythm in the 80s, however one episode was associated with heart rates up to 180s. Read as sinus by computer, but based on tracings I believe this was a supraventricular tachycardia. These were on 11/28 at around 2:35PM. Correlate clinically.   * No clear atrial fibrillation.   * Rare PACs/PVCs   * No ventricular tachycardia, automated read incorrectly interpreted artifact.       TTE (8/2021):  CONCLUSIONS  Normal left ventricular  systolic function.   No evidence of valvular abnormality based on Doppler evaluation.   Compared to the images of the prior study done 17 -  there has   been no significant change.         Nuclear Perfusion Imaging ():   RESULTS:  1.  There were no new ECG changes and no arrhythmia.    2.  Heart rate did increase to 92 and the blood pressure negrito somewhat to   162/77 after an initial drop to 117 systolic but then returned to baseline.  3.  The raw data demonstrated no unexpected extracardiac isotope uptake,   fairly dense collection of isotope in the right mid to lower abdomen.  4.  Rest-stress image comparison reveals no perfusion abnormalities in any of   the multiple SPECT image panels.  5.  The gated wall motion study demonstrated low cardiac volumes with very   small end systolic volume.  The global contractility was normal, EF 74%.    Regional contractility also normal.     SUMMARY:  Lexiscan stress myocardial perfusion stress imaging with technetium   99m tetrofosmin demonstratin.  No infarct or ischemia.  2.  Normal global and hyperdynamic regional function, EF 74%.  3.  No ECG changes or arrhythmia.  4.  No prior for comparison.              Radiology test Review:  CXR:   The mediastinal and cardiac silhouette is unremarkable.    The pulmonary vascularity is within normal limits.    The lung parenchyma demonstrates no airspace process. Calcified granuloma in the right lateral mid hemithorax is unchanged..    There is no significant pleural effusion.    There is no visible pneumothorax.    There are no acute bony abnormalities.   Impression        1.  No evidence of acute cardiopulmonary process.         PET 2018:  ELECTROCARDIOGRAPHIC FINDINGS:  EKG review shows a normal sinus rhythm with no   ischemic ST segment changes at rest or stress.     SCINTOGRAPHIC FINDINGS:  There is normal homogenous tracer uptake at rest and   stress.     GATED WALL MOTION FINDINGS:  Show an ejection fraction  of 72% at rest, which   increases to 78% at peak stress.     CONCLUSIONS AND IMPRESSION:  Normal cardiac stress test.     CCS 2021:  Coronary calcification:  LMA - 0.0  LCX - 0.0  LAD - 0.0  RCA - 0.0  PDA - 0.0     Total Calcium Score: 0.0            Medical Decision Makin. Atrial fibrillation, unspecified type (CMS-HCC)  Paroxysmal, asymptomatic.  Also has occasional short runs of symptomatic SVT.  given normotension at home, her [id number] score of 2.  We previously have discussed at length risk/benefit of anticoagulation given her history of crohns, and she opted for only anticoagulation with aspirin at this time. She since has stopped aspirin as well due to stomach discomfort.     -continue metoprolol to 50mg PO bid for rate control (she takes this 25mg qid at times which is fine). She does get symptomatic hypotension at higher doses.   -consider digoxin as an adjunctive agent if worsening symptomatic palpitations.     2. Other chest pain  Previous negative stress test and CCS of 0. Very unlikely cardiac.   -echo WNL.   -CTM    3. Essential (primary) hypertension   Likely white coat hypertension given very low readings at home that she has confirmed with her PCP's blood pressure cuff. Her Bps have recently been labile possibly due to dehydration and variable bowel habits. Spironolactone previously stopped due to dehydration and FLORES.   -restart spironolactone at 12.5mg PO daily as she did much better on this medication. We discussed high risk for recurrent FLORES and dehydration precautions. We will watch her renal function closely.   -hydralazine 10mg PO prn SBP >150  -continue metoprolol.   -continue lasix prn with potassium      4. Leg swelling  -spironolactone daily. Stopped lasix/potassium prn as she has had some dehydration episodes. Leg swelling much improved today. Currently lasix prn.   -dry weight around 152 pounds.   -echo previously did not show e/o heart failure, however she does appear to be  displaying this physiology of diastolic heart failure at times.     5. TRUDY  -continue CPAP, many of symptoms much improved.     My total time spent caring for the patient on the day of the encounter was 43 minutes.   This does not include time spent on separately billable procedures/tests.        Return in about 3 months (around 12/6/2022).      Harvey Monahan MD  [facility], [address]  [address] [address]  Phone: [phone]  Fax: [phone]

## 2022-09-07 ENCOUNTER — OFFICE VISIT (OUTPATIENT)
Dept: MEDICAL GROUP | Facility: LAB | Age: 67
End: 2022-09-07
Payer: MEDICARE

## 2022-09-07 ENCOUNTER — HOSPITAL ENCOUNTER (OUTPATIENT)
Facility: MEDICAL CENTER | Age: 67
End: 2022-09-07
Attending: STUDENT IN AN ORGANIZED HEALTH CARE EDUCATION/TRAINING PROGRAM
Payer: MEDICARE

## 2022-09-07 VITALS
BODY MASS INDEX: 21.29 KG/M2 | DIASTOLIC BLOOD PRESSURE: 74 MMHG | HEIGHT: 72 IN | SYSTOLIC BLOOD PRESSURE: 142 MMHG | HEART RATE: 91 BPM | OXYGEN SATURATION: 96 % | TEMPERATURE: 97.5 F | WEIGHT: 157.19 LBS

## 2022-09-07 DIAGNOSIS — Z79.891 CHRONIC USE OF OPIATE DRUG FOR THERAPEUTIC PURPOSE: ICD-10-CM

## 2022-09-07 DIAGNOSIS — Z79.899 CHRONIC USE OF BENZODIAZEPINE FOR THERAPEUTIC PURPOSE: ICD-10-CM

## 2022-09-07 DIAGNOSIS — Z93.2 ILEOSTOMY IN PLACE (HCC): ICD-10-CM

## 2022-09-07 DIAGNOSIS — K50.019 CROHN'S DISEASE OF SMALL INTESTINE WITH COMPLICATION (HCC): ICD-10-CM

## 2022-09-07 DIAGNOSIS — R11.14 BILIOUS VOMITING WITH NAUSEA: ICD-10-CM

## 2022-09-07 DIAGNOSIS — M32.9 SYSTEMIC LUPUS ERYTHEMATOSUS, UNSPECIFIED SLE TYPE, UNSPECIFIED ORGAN INVOLVEMENT STATUS (HCC): ICD-10-CM

## 2022-09-07 PROCEDURE — 87086 URINE CULTURE/COLONY COUNT: CPT

## 2022-09-07 PROCEDURE — 99214 OFFICE O/P EST MOD 30 MIN: CPT | Performed by: INTERNAL MEDICINE

## 2022-09-07 RX ORDER — ACYCLOVIR 400 MG/1
TABLET ORAL
COMMUNITY
Start: 2022-08-24 | End: 2022-10-21

## 2022-09-07 RX ORDER — OXYCODONE HYDROCHLORIDE 10 MG/1
10 TABLET ORAL EVERY 6 HOURS PRN
Qty: 120 TABLET | Refills: 0 | Status: SHIPPED | OUTPATIENT
Start: 2022-10-13 | End: 2022-09-27

## 2022-09-07 RX ORDER — METHOCARBAMOL 500 MG/1
500 TABLET, FILM COATED ORAL 2 TIMES DAILY PRN
COMMUNITY
Start: 2022-08-08 | End: 2022-10-21

## 2022-09-07 RX ORDER — AZELAIC ACID 0.15 G/G
1 GEL TOPICAL DAILY
COMMUNITY
End: 2023-02-08

## 2022-09-07 RX ORDER — AMOXICILLIN AND CLAVULANATE POTASSIUM 875; 125 MG/1; MG/1
TABLET, FILM COATED ORAL
COMMUNITY
End: 2022-09-07

## 2022-09-07 RX ORDER — TRIAMCINOLONE ACETONIDE 1 MG/G
CREAM TOPICAL
COMMUNITY
Start: 2022-08-24 | End: 2022-10-21

## 2022-09-07 RX ORDER — OXYCODONE HYDROCHLORIDE 10 MG/1
10 TABLET ORAL EVERY 6 HOURS PRN
Qty: 120 TABLET | Refills: 0 | Status: SHIPPED | OUTPATIENT
Start: 2022-09-13 | End: 2022-09-27

## 2022-09-07 RX ORDER — CYCLOBENZAPRINE HCL 10 MG
TABLET ORAL
COMMUNITY
End: 2022-10-21

## 2022-09-07 RX ORDER — ONDANSETRON 4 MG/1
TABLET, FILM COATED ORAL
COMMUNITY
End: 2022-10-21

## 2022-09-07 RX ORDER — OXYCODONE HYDROCHLORIDE 10 MG/1
10 TABLET ORAL EVERY 6 HOURS PRN
Qty: 120 TABLET | Refills: 0 | Status: SHIPPED | OUTPATIENT
Start: 2022-11-12 | End: 2022-09-27

## 2022-09-07 RX ORDER — NITROFURANTOIN 25; 75 MG/1; MG/1
CAPSULE ORAL
COMMUNITY
End: 2022-10-21

## 2022-09-07 RX ORDER — DIAZEPAM 5 MG/1
5 TABLET ORAL EVERY 6 HOURS PRN
Qty: 30 TABLET | Refills: 0 | Status: SHIPPED | OUTPATIENT
Start: 2022-09-07 | End: 2022-10-17

## 2022-09-07 ASSESSMENT — FIBROSIS 4 INDEX: FIB4 SCORE: 2

## 2022-09-08 NOTE — PROGRESS NOTES
CC: Jana Overton is a 67 y.o. female is suffering from   Chief Complaint   Patient presents with    Follow-Up         SUBJECTIVE:  1. Bilious vomiting with nausea  Jana is here for follow-up is having problems with vomiting    2. Systemic lupus erythematosus, unspecified SLE type, unspecified organ involvement status (HCC)  History of SLE treated with narcotic analgesics    3. Crohn's disease of small intestine with complication (HCC)  Stable, followed by gastroenterology    4. Ileostomy in place (HCC)  Stable    5. Chronic use of opiate drug for therapeutic purpose  Will be assuming care for patient's opioids    6. Chronic use of benzodiazepine for therapeutic purpose  Patient with a history of using Valium because of abdominal spasm, prescription written patient warned about increased risk of overdose        Past social, family, history: , [place]  Social History     Tobacco Use    Smoking status: Never    Smokeless tobacco: Never    Tobacco comments:     second hand smoke parents - smoked for only 1 year many years ago   Vaping Use    Vaping Use: Never used   Substance Use Topics    Alcohol use: Not Currently     Alcohol/week: 0.6 oz     Types: 1 Shots of liquor per week     Comment: gin and half a lime; tonic water    Drug use: Yes     Types: Marijuana, Inhaled     Comment: medical marijuana through bong/vape         MEDICATIONS:    Current Outpatient Medications:     riFAXIMin (XIFAXAN) 200 MG Tab, Take 1 Tablet by mouth 3 times a day., Disp: 42 Tablet, Rfl: 0    diazePAM (VALIUM) 5 MG Tab, Take 1 Tablet by mouth every 6 hours as needed for Anxiety for up to 30 days., Disp: 30 Tablet, Rfl: 0    [START ON 9/13/2022] oxyCODONE immediate release (ROXICODONE) 10 MG immediate release tablet, Take 1 Tablet by mouth every 6 hours as needed for Moderate Pain or Severe Pain (Refill #1 of #3) for up to 30 days., Disp: 120 Tablet, Rfl: 0    [START ON 10/13/2022] oxyCODONE immediate release (ROXICODONE) 10 MG  immediate release tablet, Take 1 Tablet by mouth every 6 hours as needed for Moderate Pain or Severe Pain (refill #2 of #3) for up to 30 days. Take 10 mg by mouth every four hours as needed for Severe Pain. Indications: Chronic Pain  Indications: Chronic Pain, Disp: 120 Tablet, Rfl: 0    [START ON 11/12/2022] oxyCODONE immediate release (ROXICODONE) 10 MG immediate release tablet, Take 1 Tablet by mouth every 6 hours as needed for Moderate Pain or Severe Pain (Refill #3 of #3.) for up to 30 days., Disp: 120 Tablet, Rfl: 0    ondansetron (ZOFRAN) 4 MG Tab tablet, 1 tablet, Disp: , Rfl:     acyclovir (ZOVIRAX) 400 MG tablet, , Disp: , Rfl:     amoxicillin-clavulanate (AUGMENTIN) 875-125 MG Tab, amoxicillin 875 mg-potassium clavulanate 125 mg tablet, Disp: , Rfl:     Azelaic Acid 15 % Gel, azelaic acid 15 % topical gel, Disp: , Rfl:     cyclobenzaprine (FLEXERIL) 10 mg Tab, cyclobenzaprine 10 mg tablet  TAKE 1 TABLET BY MOUTH THREE TIMES DAILY AS NEEDED, Disp: , Rfl:     methocarbamol (ROBAXIN) 500 MG Tab, Take 500 mg by mouth 2 times a day as needed., Disp: , Rfl:     nitrofurantoin (MACROBID) 100 MG Cap, Macrobid 100 mg capsule  Take 1 capsule every 12 hours by oral route as directed for 5 days., Disp: , Rfl:     nystatin (MYCOSTATIN) 484494 UNIT/ML Suspension, nystatin 100,000 unit/mL oral suspension  SHAKE LIQUID AND TAKE 5 ML BY MOUTH FOUR TIMES DAILY FOR 4 DAYS, Disp: , Rfl:     triamcinolone acetonide (KENALOG) 0.1 % Cream, , Disp: , Rfl:     spironolactone (ALDACTONE) 25 MG Tab, Take 0.5 Tablets by mouth every day., Disp: 45 Tablet, Rfl: 3    metoprolol tartrate (LOPRESSOR) 50 MG Tab, TAKE ONE TABLET BY MOUTH TWICE A DAY **HOLD IF BLOOD PRESSURE LESS THAN 95/50**, Disp: 180 Tablet, Rfl: 3    granisetron (KYTRIL) 1 MG Tab, Take 1 Tablet by mouth every 12 hours as needed for Nausea/Vomiting., Disp: 10 Tablet, Rfl: 0    hydroxychloroquine (PLAQUENIL) 200 MG Tab, Take 1.5 Tablets by mouth every day., Disp: 135  Tablet, Rfl: 3    traZODone (DESYREL) 50 MG Tab, , Disp: , Rfl:     naratriptan (AMERGE) 2.5 MG tablet, Take 1 Tablet by mouth as needed for Migraine., Disp: 5 Tablet, Rfl: 3    hydrALAZINE (APRESOLINE) 10 MG Tab, Take 1 Tablet by mouth 3 times a day as needed (systolic blood pressure >150.)., Disp: 270 Tablet, Rfl: 3    furosemide (LASIX) 20 MG Tab, Take 1 Tablet by mouth 1 time a day as needed (leg swelling/weight gain)., Disp: 90 Tablet, Rfl: 3    potassium chloride SA (KDUR) 20 MEQ Tab CR, Take 1 Tablet by mouth 1 time a day as needed (when you take lasix.)., Disp: 90 Tablet, Rfl: 3    ondansetron (ZOFRAN ODT) 4 MG TABLET DISPERSIBLE, Take 1 Tablet by mouth every 6 hours as needed for Nausea., Disp: 20 Tablet, Rfl: 0    prochlorperazine (COMPAZINE) 10 MG Tab, Take 1 Tablet by mouth 1 time a day as needed. 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    Acute encephalopathy 05/21/2022    Acute cystitis without hematuria 12/07/2021    Ureteric stone 12/07/2021    Migraine 06/04/2019    FLORES (acute kidney injury) ([facility]) 06/01/2019    Essential hypertension 05/20/2019    SIRS (systemic inflammatory response syndrome) ([facility]) 05/19/2019    High anion gap metabolic acidosis 05/19/2019    DVT (deep venous thrombosis) ([facility]) 12/31/2018    History of MRSA infection 12/29/2018    History of infection with vancomycin resistant Enterococcus  (VRE) 12/29/2018    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[facility]) 12/28/2018    Dysphasia 12/28/2018    Anemia 12/15/2018    Thrush of mouth and esophagus ([facility]) 12/13/2018    Hypokalemia 12/13/2018    Liver enzyme elevation 12/12/2018    Leukocytosis 12/12/2018    Chronic respiratory failure with hypoxia ([facility]) 03/20/2018    Anxiety disorder due to multiple medical problems 12/01/2017    DDD (degenerative disc disease), lumbar 04/12/2017    History of DVT (deep vein thrombosis) 11/23/2016    Paroxysmal atrial fibrillation ([facility]) 03/26/2015    Sleep apnea 10/26/2014    Postherpetic neuralgia 06/24/2014    Multiple falls 06/24/2014    COPD (chronic obstructive pulmonary disease) ([facility]) 06/24/2014    Rosacea 06/24/2014    Ileostomy in place ([facility]) 06/26/2013    GERD (gastroesophageal reflux disease) 12/05/2012    Status post lumbar surgery 07/16/2012    Crohn's disease of small intestine with complication ([facility]) 09/23/2009    Central sensitization to pain 09/23/2009    Opioid type dependence, continuous (CMS-[facility]) 09/23/2009    Degenerative joint disease of cervical and lumbar spine 09/23/2009       REVIEW OF SYSTEMS:  Gen.:  No Nausea, Vomiting, fever, Chills.  Heart: No chest pain.  Lungs:  No shortness of Breath.  Psychological: Rg unusual Anxiety depression     PHYSICAL EXAM   Constitutional: Alert, cooperative, not in acute distress.  Cardiovascular:  Rate Rhythm is regular without murmurs rubs clicks.     Thorax & Lungs: Clear to auscultation, no wheezing, rhonchi, or rales  HENT: Normocephalic, Atraumatic.  Eyes: PERRLA, EOMI, Conjunctiva normal.   Neck: Trachia is midline no swelling of the thyroid.   Lymphatic: No lymphadenopathy noted.   Neurologic: Alert & oriented x 3, cranial nerves II through XII are intact, Normal motor function, Normal sensory function, No focal deficits noted.   Psychiatric: Affect normal, Judgment normal, Mood normal.     VITAL SIGNS:BP (!) 142/74 (BP Location: Left arm, Patient Position: Sitting, BP Cuff  Size: Adult)   Pulse 91   Temp 36.4 °C (97.5 °F) (Temporal)   Ht 1.829 m (6')   Wt 71.3 kg (157 lb 3 oz)   SpO2 96%   BMI 21.32 kg/m²     Labs: Reviewed    Assessment:                                                     Plan:    1. Bilious vomiting with nausea  Prescription rewritten for Xifaxan  - riFAXIMin (XIFAXAN) 200 MG Tab; Take 1 Tablet by mouth 3 times a day.  Dispense: 42 Tablet; Refill: 0  - CBC WITH DIFFERENTIAL; Future  - diazePAM (VALIUM) 5 MG Tab; Take 1 Tablet by mouth every 6 hours as needed for Anxiety for up to 30 days.  Dispense: 30 Tablet; Refill: 0  - [facility]IN MANAGEMENT SCREEN; Future    2. Systemic lupus erythematosus, unspecified SLE type, unspecified organ involvement status (HCC)  Continue Roxicodone  - riFAXIMin (XIFAXAN) 200 MG Tab; Take 1 Tablet by mouth 3 times a day.  Dispense: 42 Tablet; Refill: 0  - CBC WITH DIFFERENTIAL; Future  - diazePAM (VALIUM) 5 MG Tab; Take 1 Tablet by mouth every 6 hours as needed for Anxiety for up to 30 days.  Dispense: 30 Tablet; Refill: 0  - oxyCODONE immediate release (ROXICODONE) 10 MG immediate release tablet; Take 1 Tablet by mouth every 6 hours as needed for Moderate Pain or Severe Pain (Refill #1 of #3) for up to 30 days.  Dispense: 120 Tablet; Refill: 0  - oxyCODONE immediate release (ROXICODONE) 10 MG immediate release tablet; Take 1 Tablet by mouth every 6 hours as needed for Moderate Pain or Severe Pain (refill #2 of #3) for up to 30 days. Take 10 mg by mouth every four hours as needed for Severe Pain. Indications: Chronic Pain  Indications: Chronic Pain  Dispense: 120 Tablet; Refill: 0  - oxyCODONE immediate release (ROXICODONE) 10 MG immediate release tablet; Take 1 Tablet by mouth every 6 hours as needed for Moderate Pain or Severe Pain (Refill #3 of #3.) for up to 30 days.  Dispense: 120 Tablet; Refill: 0  - [facility]NAG[facility] SCREEN; Future    3. Crohn's disease of small intestine with complication (HCC)  Continue  Roxicodone [place] drug task force [place] drug screen reviewed  - riFAXIMin (XIFAXAN) 200 MG Tab; Take 1 Tablet by mouth 3 times a day.  Dispense: 42 Tablet; Refill: 0  - CBC WITH DIFFERENTIAL; Future  - diazePAM (VALIUM) 5 MG Tab; Take 1 Tablet by mouth every 6 hours as needed for Anxiety for up to 30 days.  Dispense: 30 Tablet; Refill: 0  - oxyCODONE immediate release (ROXICODONE) 10 MG immediate release tablet; Take 1 Tablet by mouth every 6 hours as needed for Moderate Pain or Severe Pain (Refill #1 of #3) for up to 30 days.  Dispense: 120 Tablet; Refill: 0  - oxyCODONE immediate release (ROXICODONE) 10 MG immediate release tablet; Take 1 Tablet by mouth every 6 hours as needed for Moderate Pain or Severe Pain (refill #2 of #3) for up to 30 days. Take 10 mg by mouth every four hours as needed for Severe Pain. Indications: Chronic Pain  Indications: Chronic Pain  Dispense: 120 Tablet; Refill: 0  - oxyCODONE immediate release (ROXICODONE) 10 MG immediate release tablet; Take 1 Tablet by mouth every 6 hours as needed for Moderate Pain or Severe Pain (Refill #3 of #3.) for up to 30 days.  Dispense: 120 Tablet; Refill: 0  - [facility]IN MANAGEMENT SCREEN; Future    4. Ileostomy in place (HCC)  As detailed above  - riFAXIMin (XIFAXAN) 200 MG Tab; Take 1 Tablet by mouth 3 times a day.  Dispense: 42 Tablet; Refill: 0  - CBC WITH DIFFERENTIAL; Future  - diazePAM (VALIUM) 5 MG Tab; Take 1 Tablet by mouth every 6 hours as needed for Anxiety for up to 30 days.  Dispense: 30 Tablet; Refill: 0  - oxyCODONE immediate release (ROXICODONE) 10 MG immediate release tablet; Take 1 Tablet by mouth every 6 hours as needed for Moderate Pain or Severe Pain (Refill #1 of #3) for up to 30 days.  Dispense: 120 Tablet; Refill: 0  - oxyCODONE immediate release (ROXICODONE) 10 MG immediate release tablet; Take 1 Tablet by mouth every 6 hours as needed for Moderate Pain or Severe Pain (refill #2 of #3) for up to 30 days. Take 10 mg by mouth  every four hours as needed for Severe Pain. Indications: Chronic Pain  Indications: Chronic Pain  Dispense: 120 Tablet; Refill: 0  - oxyCODONE immediate release (ROXICODONE) 10 MG immediate release tablet; Take 1 Tablet by mouth every 6 hours as needed for Moderate Pain or Severe Pain (Refill #3 of #3.) for up to 30 days.  Dispense: 120 Tablet; Refill: 0  - [facility] MANAGEMENT SCREEN; Future    5. Chronic use of opiate drug for therapeutic purpose  Pain management screen ordered  - [facility] SCREEN; Future  - Controlled Substance Treatment Agreement    6. Chronic use of benzodiazepine for therapeutic purpose  Warned about the use of Valium and Roxicodone  - [facility]NAG[facility] SCREEN; Future  - Controlled Substance Treatment Agreement

## 2022-09-09 ENCOUNTER — HOSPITAL ENCOUNTER (OUTPATIENT)
Dept: LAB | Facility: MEDICAL CENTER | Age: 67
End: 2022-09-09
Attending: INTERNAL MEDICINE
Payer: MEDICARE

## 2022-09-09 ENCOUNTER — APPOINTMENT (RX ONLY)
Dept: URBAN - METROPOLITAN AREA CLINIC 20 | Facility: CLINIC | Age: 67
Setting detail: DERMATOLOGY
End: 2022-09-09

## 2022-09-09 DIAGNOSIS — Z93.2 ILEOSTOMY IN PLACE (HCC): ICD-10-CM

## 2022-09-09 DIAGNOSIS — M32.9 SYSTEMIC LUPUS ERYTHEMATOSUS, UNSPECIFIED SLE TYPE, UNSPECIFIED ORGAN INVOLVEMENT STATUS (HCC): ICD-10-CM

## 2022-09-09 DIAGNOSIS — R11.14 BILIOUS VOMITING WITH NAUSEA: ICD-10-CM

## 2022-09-09 DIAGNOSIS — K50.019 CROHN'S DISEASE OF SMALL INTESTINE WITH COMPLICATION (HCC): ICD-10-CM

## 2022-09-09 DIAGNOSIS — J96.11 CHRONIC RESPIRATORY FAILURE WITH HYPOXIA (HCC): ICD-10-CM

## 2022-09-09 DIAGNOSIS — Z41.9 ENCOUNTER FOR PROCEDURE FOR PURPOSES OTHER THAN REMEDYING HEALTH STATE, UNSPECIFIED: ICD-10-CM

## 2022-09-09 DIAGNOSIS — N17.9 AKI (ACUTE KIDNEY INJURY) (HCC): ICD-10-CM

## 2022-09-09 DIAGNOSIS — M79.89 LEG SWELLING: ICD-10-CM

## 2022-09-09 DIAGNOSIS — N18.32 STAGE 3B CHRONIC KIDNEY DISEASE: ICD-10-CM

## 2022-09-09 LAB
ALBUMIN SERPL BCP-MCNC: 4.8 G/DL (ref 3.2–4.9)
ALBUMIN/GLOB SERPL: 1.5 G/DL
ALP SERPL-CCNC: 118 U/L (ref 30–99)
ALT SERPL-CCNC: 12 U/L (ref 2–50)
ANION GAP SERPL CALC-SCNC: 13 MMOL/L (ref 7–16)
AST SERPL-CCNC: 24 U/L (ref 12–45)
BASOPHILS # BLD AUTO: 0.9 % (ref 0–1.8)
BASOPHILS # BLD: 0.08 K/UL (ref 0–0.12)
BILIRUB SERPL-MCNC: 0.4 MG/DL (ref 0.1–1.5)
BUN SERPL-MCNC: 22 MG/DL (ref 8–22)
CALCIUM SERPL-MCNC: 10.3 MG/DL (ref 8.5–10.5)
CHLORIDE SERPL-SCNC: 105 MMOL/L (ref 96–112)
CO2 SERPL-SCNC: 22 MMOL/L (ref 20–33)
CREAT SERPL-MCNC: 1 MG/DL (ref 0.5–1.4)
EOSINOPHIL # BLD AUTO: 0.15 K/UL (ref 0–0.51)
EOSINOPHIL NFR BLD: 1.7 % (ref 0–6.9)
ERYTHROCYTE [DISTWIDTH] IN BLOOD BY AUTOMATED COUNT: 41.1 FL (ref 35.9–50)
GFR SERPLBLD CREATININE-BSD FMLA CKD-EPI: 61 ML/MIN/1.73 M 2
GLOBULIN SER CALC-MCNC: 3.3 G/DL (ref 1.9–3.5)
GLUCOSE SERPL-MCNC: 101 MG/DL (ref 65–99)
HCT VFR BLD AUTO: 45.6 % (ref 37–47)
HGB BLD-MCNC: 15.1 G/DL (ref 12–16)
IMM GRANULOCYTES # BLD AUTO: 0.03 K/UL (ref 0–0.11)
IMM GRANULOCYTES NFR BLD AUTO: 0.3 % (ref 0–0.9)
LYMPHOCYTES # BLD AUTO: 1.56 K/UL (ref 1–4.8)
LYMPHOCYTES NFR BLD: 17.2 % (ref 22–41)
MAGNESIUM SERPL-MCNC: 2 MG/DL (ref 1.5–2.5)
MCH RBC QN AUTO: 30.4 PG (ref 27–33)
MCHC RBC AUTO-ENTMCNC: 33.1 G/DL (ref 33.6–35)
MCV RBC AUTO: 91.9 FL (ref 81.4–97.8)
MONOCYTES # BLD AUTO: 0.59 K/UL (ref 0–0.85)
MONOCYTES NFR BLD AUTO: 6.5 % (ref 0–13.4)
NEUTROPHILS # BLD AUTO: 6.67 K/UL (ref 2–7.15)
NEUTROPHILS NFR BLD: 73.4 % (ref 44–72)
NRBC # BLD AUTO: 0 K/UL
NRBC BLD-RTO: 0 /100 WBC
PHOSPHATE SERPL-MCNC: 2.6 MG/DL (ref 2.5–4.5)
PLATELET # BLD AUTO: 220 K/UL (ref 164–446)
PMV BLD AUTO: 11.4 FL (ref 9–12.9)
POTASSIUM SERPL-SCNC: 4.2 MMOL/L (ref 3.6–5.5)
PROT SERPL-MCNC: 8.1 G/DL (ref 6–8.2)
RBC # BLD AUTO: 4.96 M/UL (ref 4.2–5.4)
SODIUM SERPL-SCNC: 140 MMOL/L (ref 135–145)
WBC # BLD AUTO: 9.1 K/UL (ref 4.8–10.8)

## 2022-09-09 PROCEDURE — ? EVO ND:YAG 1064NM

## 2022-09-09 PROCEDURE — 80053 COMPREHEN METABOLIC PANEL: CPT

## 2022-09-09 PROCEDURE — 83735 ASSAY OF MAGNESIUM: CPT

## 2022-09-09 PROCEDURE — 85025 COMPLETE CBC W/AUTO DIFF WBC: CPT

## 2022-09-09 PROCEDURE — 36415 COLL VENOUS BLD VENIPUNCTURE: CPT

## 2022-09-09 PROCEDURE — 84100 ASSAY OF PHOSPHORUS: CPT

## 2022-09-09 ASSESSMENT — LOCATION SIMPLE DESCRIPTION DERM
LOCATION SIMPLE: RIGHT PRETIBIAL REGION
LOCATION SIMPLE: LEFT PRETIBIAL REGION

## 2022-09-09 ASSESSMENT — LOCATION DETAILED DESCRIPTION DERM
LOCATION DETAILED: LEFT PROXIMAL PRETIBIAL REGION
LOCATION DETAILED: RIGHT DISTAL PRETIBIAL REGION

## 2022-09-09 ASSESSMENT — LOCATION ZONE DERM: LOCATION ZONE: LEG

## 2022-09-09 NOTE — PROCEDURE: EVO ND:YAG [id number]
Add Another Setting?: no
Anesthesia Type: 1% lidocaine with epinephrine
Post-Care Instructions: I reviewed with the patient in detail post-care instructions. Patient should avoid sun exposure before and after treatment.  Diligent sunscreen use and sun avoidance advised.
Price (Use Numbers Only, No Special Characters Or $): 300.00
Treatment Number: 1
Spot Size: 3mm
Number Of Prepaid Treatments (Will Not Render If 0): 0
Pulse Duration (Ms): 10
Fluence (J/Cm2): 17
Detail Level: Simple
Consent: Written consent obtained.  Risks reviewed including but not limited to erythema, swelling, crusting, scabbing, blistering, scarring, and darker or lighter pigmentary change.  Patient understands that results are not guaranteed and multiple treatments may be needed to achieve desired result.
Procedure Note: Treatment was administered using the setting parameters listed above.

## 2022-09-10 LAB
BACTERIA UR CULT: NORMAL
SIGNIFICANT IND 70042: NORMAL
SITE SITE: NORMAL
SOURCE SOURCE: NORMAL

## 2022-09-12 ENCOUNTER — OFFICE VISIT (OUTPATIENT)
Dept: MEDICAL GROUP | Facility: LAB | Age: 67
End: 2022-09-12
Payer: MEDICARE

## 2022-09-12 ENCOUNTER — HOSPITAL ENCOUNTER (OUTPATIENT)
Facility: MEDICAL CENTER | Age: 67
End: 2022-09-12
Attending: NURSE PRACTITIONER
Payer: MEDICARE

## 2022-09-12 VITALS
HEIGHT: 72 IN | HEART RATE: 68 BPM | SYSTOLIC BLOOD PRESSURE: 118 MMHG | RESPIRATION RATE: 14 BRPM | BODY MASS INDEX: 21.4 KG/M2 | WEIGHT: 158 LBS | TEMPERATURE: 97.1 F | DIASTOLIC BLOOD PRESSURE: 54 MMHG | OXYGEN SATURATION: 95 %

## 2022-09-12 DIAGNOSIS — N89.8 VAGINAL DISCHARGE: ICD-10-CM

## 2022-09-12 DIAGNOSIS — R87.9 ABNORMAL VAGINAL FLUIDS: ICD-10-CM

## 2022-09-12 DIAGNOSIS — N94.10 DYSPAREUNIA, FEMALE: ICD-10-CM

## 2022-09-12 DIAGNOSIS — K50.019 CROHN'S DISEASE OF SMALL INTESTINE WITH COMPLICATION (HCC): ICD-10-CM

## 2022-09-12 PROCEDURE — 87591 N.GONORRHOEAE DNA AMP PROB: CPT

## 2022-09-12 PROCEDURE — 87491 CHLMYD TRACH DNA AMP PROBE: CPT

## 2022-09-12 PROCEDURE — 87510 GARDNER VAG DNA DIR PROBE: CPT

## 2022-09-12 PROCEDURE — 87480 CANDIDA DNA DIR PROBE: CPT

## 2022-09-12 PROCEDURE — 99213 OFFICE O/P EST LOW 20 MIN: CPT | Performed by: NURSE PRACTITIONER

## 2022-09-12 PROCEDURE — 87660 TRICHOMONAS VAGIN DIR PROBE: CPT

## 2022-09-12 RX ORDER — PROCHLORPERAZINE MALEATE 10 MG
10 TABLET ORAL
Qty: 20 TABLET | Refills: 0 | Status: SHIPPED | OUTPATIENT
Start: 2022-09-12 | End: 2022-11-07

## 2022-09-12 ASSESSMENT — FIBROSIS 4 INDEX: FIB4 SCORE: 2.17

## 2022-09-12 NOTE — PROGRESS NOTES
Subjective:     CC:   Chief Complaint   Patient presents with    Pelvic Pain     Greenish discharge        HPI:   Jana presents today with the following:    Abnormal vaginal discharge  Patient was seen previously in June for the same. Negative for vaginal pathogens at that time, but still feels like something is not right. Not having pain currently, but does report some dyspareunia. She feels pressure with intercourse and feels like she needs to urinate.   Has tried OTC yeast infection medications without improvement.   Denies dysuria, frequency, urgency, fever, chills, N/V, vaginal odor, vulvovaginal irritation.  She did have recent CT abd and pelvis that was negative.     ROS:   As documented in history of present illness above    Objective:     Exam: /54 (BP Location: Right arm, Patient Position: Sitting, BP Cuff Size: Adult)   Pulse 68   Temp 36.2 °C (97.1 °F)   Resp 14   Ht 1.829 m (6')   Wt 71.7 kg (158 lb)   SpO2 95%  Body mass index is 21.43 kg/m².    Constitutional: Alert, no distress, well-groomed.  Skin: Warm, dry, good turgor, no rashes in visible areas.  Eye: Equal, round and reactive, conjunctiva clear, lids normal.  ENMT: Lips without lesions, good dentition, moist mucous membranes.  Neck: Trachea midline, no masses, no thyromegaly.  Respiratory: Unlabored respiratory effort, no cough.  : Perineum and external genitalia normal without rash. Vagina with clear discharge.  MSK: Normal gait, moves all extremities.  Neuro: Grossly non-focal.   Psych: Alert and oriented x3, normal affect and mood.    Assessment & Plan:     67 y.o. female with the following -     1. Crohn's disease of small intestine with complication (HCC)  Medication refilled.   - prochlorperazine (COMPAZINE) 10 MG Tab; Take 1 Tablet by mouth 1 time a day as needed for Nausea/Vomiting.  Dispense: 20 Tablet; Refill: 0    2. Vaginal discharge  3. Dyspareunia, female  4. Abnormal vaginal fluids  Vaginal swab collected today,  will also check for chlamydia and gonorrhea. Will follow-up pending results.  No signs or symptoms of acute bacterial or infectious etiology of patient's symptoms. Differential diagnosis, natural history, supportive care, and indications for immediate follow-up discussed. Patient verbalized understanding of treatment plan and has no further questions regarding care.   - VAGINAL PATHOGENS DNA PANEL; Future

## 2022-09-13 ENCOUNTER — HOSPITAL ENCOUNTER (OUTPATIENT)
Dept: RADIOLOGY | Facility: MEDICAL CENTER | Age: 67
End: 2022-09-13
Attending: UROLOGY
Payer: MEDICARE

## 2022-09-13 DIAGNOSIS — N20.1 CALCULUS OF URETER: ICD-10-CM

## 2022-09-13 DIAGNOSIS — N89.8 VAGINAL DISCHARGE: ICD-10-CM

## 2022-09-13 PROCEDURE — 74018 RADEX ABDOMEN 1 VIEW: CPT

## 2022-09-14 ENCOUNTER — TELEPHONE (OUTPATIENT)
Dept: MEDICAL GROUP | Facility: LAB | Age: 67
End: 2022-09-14
Payer: MEDICARE

## 2022-09-14 LAB
C TRACH DNA GENITAL QL NAA+PROBE: NEGATIVE
CANDIDA DNA VAG QL PROBE+SIG AMP: NEGATIVE
G VAGINALIS DNA VAG QL PROBE+SIG AMP: NEGATIVE
N GONORRHOEA DNA GENITAL QL NAA+PROBE: NEGATIVE
SPECIMEN SOURCE: NORMAL
T VAGINALIS DNA VAG QL PROBE+SIG AMP: NEGATIVE

## 2022-09-16 NOTE — ED NOTES
IV established. Blood sent to lab.   Show Applicator Variable?: Yes Render Note In Bullet Format When Appropriate: No Consent: The patient's consent was obtained including but not limited to risks of crusting, scabbing, blistering, scarring, darker or lighter pigmentary change, recurrence, incomplete removal and infection. Duration Of Freeze Thaw-Cycle (Seconds): 0 Number Of Freeze-Thaw Cycles: 2 freeze-thaw cycles Detail Level: Detailed Post-Care Instructions: I reviewed with the patient in detail post-care instructions. Patient is to wear sunprotection, and avoid picking at any of the treated lesions. Pt may apply Vaseline to crusted or scabbing areas.

## 2022-09-22 ENCOUNTER — APPOINTMENT (RX ONLY)
Dept: URBAN - METROPOLITAN AREA CLINIC 20 | Facility: CLINIC | Age: 67
Setting detail: DERMATOLOGY
End: 2022-09-22

## 2022-09-22 DIAGNOSIS — L57.0 ACTINIC KERATOSIS: ICD-10-CM

## 2022-09-22 PROCEDURE — ? PDT: RED

## 2022-09-22 PROCEDURE — ? PHOTODYNAMIC THERAPY COUNSELING

## 2022-09-22 PROCEDURE — ? SUNSCREEN RECOMMENDATIONS

## 2022-09-22 PROCEDURE — 96567 PDT DSTR PRMLG LES SKN: CPT

## 2022-09-22 ASSESSMENT — LOCATION DETAILED DESCRIPTION DERM
LOCATION DETAILED: LEFT PROXIMAL DORSAL FOREARM
LOCATION DETAILED: RIGHT PROXIMAL DORSAL FOREARM

## 2022-09-22 ASSESSMENT — LOCATION ZONE DERM: LOCATION ZONE: ARM

## 2022-09-22 ASSESSMENT — LOCATION SIMPLE DESCRIPTION DERM
LOCATION SIMPLE: RIGHT FOREARM
LOCATION SIMPLE: LEFT FOREARM

## 2022-09-22 NOTE — PROCEDURE: PDT: RED
Who Performed The Pdt?: Performed by Nurse, MA or Aesthetician ([id number])
Application Method: under occlusion
Anesthesia Type: 0.5% lidocaine with 1:200,000 epinephrine
Expiration Date (Optional): 09.2023
Consent: Written consent obtained.  The risks were reviewed with the patient including but not limited to: pigmentary changes, pain, blistering, scabbing, redness, and the remote possibility of scarring.
Total Number Of Aks Treated (Optional To Report): 0
Illumination Time: 7 min 7 sec
Debridement Text (Will Only Render In Visit Note If You Select Debridement Option Under Who Performed The Pdt Field): Prior to application of the photodynamic medication the hyperkeratotic lesions were curetted to make them more amenable to therapy.
Post-Care Instructions: I reviewed with the patient in detail post-care instructions. Patient is to avoid sunlight for the next 2 days, and wear sun protection. Patients may expect sunburn like redness, discomfort and scabbing.
Detail Level: Zone
Lot # (Optional): [id number]
Ndc# (Optional): [id number]
Number Of Kerasticks/Tubes Of Metvixia/Tubes Of Ameluz Used: 1
Incubation Time (Set To 00:00:00 If Not Wanted): 03:00
Photosensitizer: Ameluz
Eye Protection Applied?: Yes

## 2022-09-27 ENCOUNTER — HOSPITAL ENCOUNTER (OUTPATIENT)
Dept: RADIOLOGY | Facility: MEDICAL CENTER | Age: 67
End: 2022-09-27
Attending: COLON & RECTAL SURGERY
Payer: MEDICARE

## 2022-09-27 ENCOUNTER — TELEPHONE (OUTPATIENT)
Dept: MEDICAL GROUP | Facility: LAB | Age: 67
End: 2022-09-27

## 2022-09-27 ENCOUNTER — OFFICE VISIT (OUTPATIENT)
Dept: RHEUMATOLOGY | Facility: MEDICAL CENTER | Age: 67
End: 2022-09-27
Attending: STUDENT IN AN ORGANIZED HEALTH CARE EDUCATION/TRAINING PROGRAM
Payer: MEDICARE

## 2022-09-27 VITALS
OXYGEN SATURATION: 94 % | HEART RATE: 65 BPM | SYSTOLIC BLOOD PRESSURE: 128 MMHG | WEIGHT: 163 LBS | DIASTOLIC BLOOD PRESSURE: 80 MMHG | HEIGHT: 72 IN | RESPIRATION RATE: 16 BRPM | TEMPERATURE: 97.3 F | BODY MASS INDEX: 22.08 KG/M2

## 2022-09-27 DIAGNOSIS — R10.12 ABDOMINAL PAIN, LEFT UPPER QUADRANT: ICD-10-CM

## 2022-09-27 DIAGNOSIS — Z43.2 ATTENTION TO ILEOSTOMY (HCC): ICD-10-CM

## 2022-09-27 DIAGNOSIS — Z79.899 LONG-TERM USE OF HYDROXYCHLOROQUINE: ICD-10-CM

## 2022-09-27 DIAGNOSIS — M79.7 FIBROMYALGIA SYNDROME: ICD-10-CM

## 2022-09-27 DIAGNOSIS — T50.905A DRUG-INDUCED LUPUS ERYTHEMATOSUS: ICD-10-CM

## 2022-09-27 DIAGNOSIS — L93.2 DRUG-INDUCED LUPUS ERYTHEMATOSUS: ICD-10-CM

## 2022-09-27 DIAGNOSIS — R10.32 ABDOMINAL PAIN, LEFT LOWER QUADRANT: ICD-10-CM

## 2022-09-27 DIAGNOSIS — R11.12 PROJECTILE VOMITING WITH NAUSEA: ICD-10-CM

## 2022-09-27 PROCEDURE — 74248 X-RAY SM INT F-THRU STD: CPT

## 2022-09-27 PROCEDURE — 99212 OFFICE O/P EST SF 10 MIN: CPT | Performed by: STUDENT IN AN ORGANIZED HEALTH CARE EDUCATION/TRAINING PROGRAM

## 2022-09-27 PROCEDURE — A9270 NON-COVERED ITEM OR SERVICE: HCPCS | Performed by: COLON & RECTAL SURGERY

## 2022-09-27 PROCEDURE — 700112 HCHG RX REV CODE 229: Performed by: COLON & RECTAL SURGERY

## 2022-09-27 PROCEDURE — 99214 OFFICE O/P EST MOD 30 MIN: CPT | Performed by: STUDENT IN AN ORGANIZED HEALTH CARE EDUCATION/TRAINING PROGRAM

## 2022-09-27 RX ADMIN — ANTACID/ANTIFLATULENT 1 PACKET: 380; 550; 10; 10 GRANULE, EFFERVESCENT ORAL at 09:25

## 2022-09-27 ASSESSMENT — FIBROSIS 4 INDEX: FIB4 SCORE: 2.17

## 2022-09-27 NOTE — TELEPHONE ENCOUNTER
"Physical Therapy paperwork received from [facility] requiring provider signature.     All appropriate fields completed by Medical Assistant: N/A CMA printed and distributed to MA    Paperwork placed in \"MA to Provider\" folder/basket. Awaiting provider completion/signature.    "

## 2022-09-27 NOTE — PROGRESS NOTES
[facility] RHEUMATOLOGY  [address], [address], [place] [address]  Phone: [phone] ? Fax: [phone]    RHEUMATOLOGY FOLLOW-UP VISIT NOTE      DATE OF SERVICE: 09/27/2022    PRIMARY CARE PROVIDER:  Chase Charles D.O.  [address]  [place] [address]    PATIENT IDENTIFICATION:  Jana Overton  [address] [place] [address]    YOB: 1955    MEDICAL RECORD NUMBER: [id number]     DATE OF LAST VISIT: Visit date not found      SUBJECTIVE:     CHIEF COMPLAINT:   Chief Complaint   Patient presents with    Follow-Up     Positive VAHID        RHEUMATOLOGIC HISTORY:  Jana Overton is a 67 y.o. female with pertinent history notable for drug-induced lupus diagnosed in 7/2022, Crohn's disease diagnosed in late 1990s/early 2000s, DJD/DDD of cervical and lumbar spines s/p cervical and lumbar fusions, fibromyalgia/chronic pain syndrome since the 1990s (under the care of pain management), and multiple comorbidities. She initially presented on 7/27/2022 for evaluation in the setting of positive VAHID, reported widespread joint/muscle pain involving her hands, elbows, shoulders, neck, upper/mid/lower back, knees, ankles, and feet feet. These were associated with all day joint stiffness that improves with activity but her joint/muscle pain tends to worsen with much activity such that she feels exhausted by the end of the day. Reported chronic fatigue with muscle weakness, photosensitive rash on face/extremities, Raynaud's phenomenon on fingers/toes, dry eyes with redness/pain, dry mouth with episodic ulcers, and numerous nonspecific symptoms from multiple organ systems. Noted that she had been trying to manage with natural/alternative medicines to avoid needing to take immunosuppressive medications.     Pertinent treatment history as of 7/2022: Remicade for Crohn's disease (last infusion in early 2000s), Neurontin and Lyrica (neither of which was very helpful), hydroxychloroquine 300 mg  daily (8/2022-present).    Pertinent lab results as of 6-7/2022: Positive VAHID 1:640 homogenous pattern with positive anti-histone 2.9 and anti-chromatin 24; positive IgA anti-CL 24 and IgG anti-CL 19 with negative IgM anti-CL (in 10/2021); elevated ESR 27 and CRP 0.84; negative/normal ANCA (in 10/2021), RF, ACCP, HLA-B27, anti-TPO, anti-TG, C3, C4, CH50, CPK, TSH, vitamin D,  unremarkable CBC and CMP.    INTERVAL HISTORY:  Waxing/waning diffuse joint/muscle pain and stiffness but now better able to engage in her activities.  Feels that hydroxychloroquine has been very helpful for her symptoms.    REVIEW OF SYSTEMS:  Except as noted in the history above, relevant review of systems with emphasis on autoimmune inflammatory processes was otherwise negative.      ACTIVE PROBLEM LIST:  Patient Active Problem List   Diagnosis    Crohn's disease of small intestine with complication ([facility])    Central sensitization to pain    Opioid type dependence, continuous ([facility])    Degenerative joint disease of cervical and lumbar spine    Status post lumbar surgery    GERD (gastroesophageal reflux disease)    Ileostomy in place ([facility])    Postherpetic neuralgia    Multiple falls    COPD (chronic obstructive pulmonary disease) ([facility])    Rosacea    Sleep apnea    Paroxysmal atrial fibrillation ([facility])    History of DVT (deep vein thrombosis)    DDD (degenerative disc disease), lumbar    Anxiety disorder due to multiple medical problems    Chronic respiratory failure with hypoxia ([facility])    Liver enzyme elevation    Leukocytosis    Thrush of mouth and esophagus ([facility])    Hypokalemia    Anemia    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[facility])    Dysphasia    History of MRSA infection    History of infection with vancomycin resistant Enterococcus (VRE)    DVT (deep venous thrombosis) ([facility])    SIRS (systemic inflammatory response syndrome) ([facility])    High anion gap metabolic acidosis    Essential hypertension    FOLRES (acute kidney injury) ([facility])    Migraine    Acute  cystitis without hematuria    Ureteric stone    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      PAST MEDICAL HISTORY:  Past Medical History:   Diagnosis Date    Anesthesia     difficult Iv stick    Anxiety     Arthritis     all over    ASTHMA     Atrial fib/flut     Backpain     Bronchitis     5 years    Chronic pain     Colostomy in place ([facility]) 10/14/2010    COPD (chronic obstructive pulmonary disease) ([facility]) 6/24/2014    COPD (chronic obstructive pulmonary disease) ([facility]) 6/24/2014    Crohn's disease of colon ([facility])     Dyspnea     History of cardiac murmur     Hypokalemia 6/24/2014    Ileostomy present ([facility])     Infectious disease     MRSA, VancoRSA    Multiple falls 6/24/2014    Narcotic dependence ([facility]) 9/23/2009    Obstruction     Other specified disorder of intestines     crohns disease    Pain 7/11/13    all over    Pneumonia     child and mid 30's    Postherpetic neuralgia 6/24/2014    Rosacea 6/24/2014    Sleep apnea        PAST SURGICAL HISTORY:  Past Surgical History:   Procedure Laterality Date    SD CYSTOSCOPY,INSERT URETERAL STENT Right 12/23/2021    Procedure: CYSTOSCOPY, WITH URETERAL STENT EXCHANGE;  Surgeon: Jonathan Turcios M.D.;  Location: [facility];  Service: Urology    SD CYSTO/URETERO/PYELOSCOPY, DX Right 12/23/2021    Procedure: URETEROSCOPY;  Surgeon: Jonathan Turcios M.D.;  Location: SURGERY [facility];  Service: Urology    SD CYSTO/URETERO/PYELOSCOPY, DX Right 12/8/2021    Procedure: URETEROSCOPY;  Surgeon: Luc Hook M.D.;  Location: [facility];  Service:  Urology    CYSTO STENT PLACEMNT PRE SURG Right 12/8/2021    Procedure: CYSTOSCOPY, WITH URETERAL STENT INSERTION;  Surgeon: Luc Hook M.D.;  Location: [facility];  Service: Urology    ILEOSTOMY  1/20/2021    Procedure: ILEOSTOMY- COMPLEX REVISION,;  Surgeon: Kevin Cruz M.D.;  Location: [facility];  Service: General    LYSIS ADHESIONS GENERAL  1/20/2021    Procedure: LYSIS, ADHESIONS;  Surgeon: Kevin Cruz M.D.;  Location: [facility];  Service: General    GASTROSCOPY N/A 5/22/2019    Procedure: GASTROSCOPY - WITH DILATION;  Surgeon: Dionicio Cardona M.D.;  Location: [facility];  Service: Gastroenterology    GASTROSCOPY  1/6/2019    Procedure: GASTROSCOPY- WITH DILATION;  Surgeon: Daniel Daniels M.D.;  Location: [facility];  Service: Gastroenterology    ILEOSTOMY  12/29/2018    Procedure: ILEOSTOMY- REVISION;  Surgeon: Kevin Cruz M.D.;  Location: [facility];  Service: General    SIGMOIDOSCOPY FLEX  12/29/2018    Procedure: SIGMOIDOSCOPY FLEX;  Surgeon: Kevin Cruz M.D.;  Location: [facility];  Service: General    EXPLORATORY LAPAROTOMY  12/29/2018    Procedure: EXPLORATORY LAPAROTOMY, lysis of adhesions;  Surgeon: Kevin Cruz M.D.;  Location: [facility];  Service: General    ILEOSTOMY  5/14/2014    Performed by Kevin Cruz M.D. at [facility] ORS    MAMMOPLASTY REDUCTION  7/17/2013    Performed by Janey Burt M.D. at [facility] ORS    HARDWARE REMOVAL NEURO  7/16/2012    Performed by KEELEY KIM at [facility]    GASTROSCOPY  10/4/2011    Performed by TYSON MARTINEZ at SURGERY SAME DAY [facility] ORS    COLONOSCOPY  10/4/2011    Performed by TYSON MARTINEZ at SURGERY SAME DAY [facility] ORS    DILATION AND CURETTAGE  9/24/2010    Performed by GAURAV ALY at SURGERY SAME DAY [facility] ORS    ILEOSTOMY  11/11/2009    Performed by KEVIN CRUZ at  SURGERY [facility] ORS    GASTROSCOPY WITH BIOPSY  11/22/08    Performed by NEGRITA HUYNH at SURGERY [facility] ORS    ABDOMINAL EXPLORATION      CERVICAL DISK AND FUSION ANTERIOR      COLON RESECTION      FOOT SURGERY      HAND SURGERY      LUMBAR FUSION ANTERIOR      LUMPECTOMY      OTHER ABDOMINAL SURGERY      surgery for chrons disease    IN BREAST REDUCTION         MEDICATIONS:  Current Outpatient Medications   Medication Sig Dispense Refill    prochlorperazine (COMPAZINE) 10 MG Tab Take 1 Tablet by mouth 1 time a day as needed for Nausea/Vomiting. 20 Tablet 0    riFAXIMin (XIFAXAN) 200 MG Tab Take 1 Tablet by mouth 3 times a day. 42 Tablet 0    diazePAM (VALIUM) 5 MG Tab Take 1 Tablet by mouth every 6 hours as needed for Anxiety for up to 30 days. 30 Tablet 0    oxyCODONE immediate release (ROXICODONE) 10 MG immediate release tablet Take 1 Tablet by mouth every 6 hours as needed for Moderate Pain or Severe Pain (Refill #1 of #3) for up to 30 days. 120 Tablet 0    [START ON 10/13/2022] oxyCODONE immediate release (ROXICODONE) 10 MG immediate release tablet Take 1 Tablet by mouth every 6 hours as needed for Moderate Pain or Severe Pain (refill #2 of #3) for up to 30 days. Take 10 mg by mouth every four hours as needed for Severe Pain. Indications: Chronic Pain  Indications: Chronic Pain 120 Tablet 0    [START ON 11/12/2022] oxyCODONE immediate release (ROXICODONE) 10 MG immediate release tablet Take 1 Tablet by mouth every 6 hours as needed for Moderate Pain or Severe Pain (Refill #3 of #3.) for up to 30 days. 120 Tablet 0    ondansetron (ZOFRAN) 4 MG Tab tablet 1 tablet      acyclovir (ZOVIRAX) 400 MG tablet       Azelaic Acid 15 % Gel azelaic acid 15 % topical gel      cyclobenzaprine (FLEXERIL) 10 mg Tab cyclobenzaprine 10 mg tablet   TAKE 1 TABLET BY MOUTH THREE TIMES DAILY AS NEEDED      methocarbamol (ROBAXIN) 500 MG Tab Take 500 mg by mouth 2 times a day as needed.      nitrofurantoin (MACROBID) 100  MG Cap Macrobid 100 mg capsule   Take 1 capsule every 12 hours by oral route as directed for 5 days.      nystatin (MYCOSTATIN) 611377 UNIT/ML Suspension nystatin 100,000 unit/mL oral suspension   SHAKE LIQUID AND TAKE 5 ML BY MOUTH FOUR TIMES DAILY FOR 4 DAYS      triamcinolone acetonide (KENALOG) 0.1 % Cream       spironolactone (ALDACTONE) 25 MG Tab Take 0.5 Tablets by mouth every day. 45 Tablet 3    metoprolol tartrate (LOPRESSOR) 50 MG Tab TAKE ONE TABLET BY MOUTH TWICE A DAY **HOLD IF BLOOD PRESSURE LESS THAN 95/50** 180 Tablet 3    granisetron (KYTRIL) 1 MG Tab Take 1 Tablet by mouth every 12 hours as needed for Nausea/Vomiting. 10 Tablet 0    hydroxychloroquine (PLAQUENIL) 200 MG Tab Take 1.5 Tablets by mouth every day. 135 Tablet 3    traZODone (DESYREL) 50 MG Tab       naratriptan (AMERGE) 2.5 MG tablet Take 1 Tablet by mouth as needed for Migraine. 5 Tablet 3    hydrALAZINE (APRESOLINE) 10 MG Tab Take 1 Tablet by mouth 3 times a day as needed (systolic blood pressure >150.). 270 Tablet 3    furosemide (LASIX) 20 MG Tab Take 1 Tablet by mouth 1 time a day as needed (leg swelling/weight gain). 90 Tablet 3    potassium chloride SA (KDUR) 20 MEQ Tab CR Take 1 Tablet by mouth 1 time a day as needed (when you take lasix.). 90 Tablet 3    ondansetron (ZOFRAN ODT) 4 MG TABLET DISPERSIBLE Take 1 Tablet by mouth every 6 hours as needed for Nausea. 20 Tablet 0    Probiotic Product (PROBIOTIC PO) Take 2 Capsules by mouth every day.       No current facility-administered medications for this visit.       ALLERGIES:   Allergies   Allergen Reactions    Azathioprine Sodium Hives and Shortness of Breath    Infliximab Hives, Shortness of Breath and Rash     .    Methotrexate Hives, Shortness of Breath and Rash     .    Methotrexate Hives, Rash and Shortness of Breath     .    Morphine Shortness of Breath, Swelling and Palpitations    Promethazine Shortness of Breath and Rash     .    Roxanol [Morphine Sulfate] Swelling      "Throat swells    Amitriptyline Unspecified     Nightmares      Carafate [Sucralfate] Vomiting and Nausea     Generalizes/Mouth Sores    Demerol Vomiting    Fentanyl Unspecified     Patches caused skin breakdown, no pain relief    Fentanyl Rash     \"Patches didn't work\"  Skin broke down    Lorazepam Unspecified     States give me nightmares.     Meperidine Vomiting    Methadone Unspecified     Didn't work    Other Drug Unspecified     steroids    Pregabalin Rash     Rash      Pseudoephedrine Vomiting    Sulfa Drugs Hives and Rash     .  .  Other reaction(s): Reaction:nausea, vomiting, abdominal pain    Ativan Rash     Other reaction(s): Reaction:nightmares    Ketorolac Tromethamine     Morphine Sulfate      Other reaction(s): Unknown    Potassium      Other reaction(s): Notes: In IV only    Toradol Unspecified    Betamethasone Unspecified     Pt unable to remember    Celestone [Betamethasone Sodium Phosphate] Unspecified     Pt unable to remember    Sulfasalazine Rash     On chest    Tizanidine Unspecified     Pt unable to remember  Other reaction(s): Unknown       IMMUNIZATIONS:  Immunization History   Administered Date(s) Administered    INFLUENZA TIV (IM) 09/17/2007, 10/06/2012, 11/16/2013, 09/27/2015    Influenza (IM) Preservative Free - HISTORICAL DATA 12/17/2010, 09/27/2015    Influenza Seasonal Injectable - Historical Data 10/06/2012, 11/16/2013    Influenza Vac Subunit Quad Inj (Pf) 10/10/2017    Influenza Vaccine Adult HD 11/12/2018, 09/23/2020, 10/11/2021    Influenza Vaccine Quad Inj (Pf) 10/24/2014, 10/04/2017    Influenza Vaccine Quad Inj (Preserved) 09/21/2016, 10/11/2019    Influenza Vaccine-Flucelvax - HISTORICAL DATA 01/06/2014    PFIZER DELATORRE CAP SARS-COV-2 VACCINATION (12+) 05/12/2022    PFIZER PURPLE CAP SARS-COV-2 VACCINATION (12+) 03/17/2021, 04/08/2021, 10/29/2021    Pneumococcal Conjugate Vaccine (Prevnar/PCV-13) 11/12/2018    Pneumococcal Vaccine (UF) - HISTORICAL DATA 10/17/2007    " Pneumococcal polysaccharide vaccine (PPSV-23) 10/06/2012    Tdap Vaccine 12/04/2012, 11/16/2013    ZZZInfluenza Vaccine Pediatric Preserv Free 09/29/2009    Zoster Vaccine Live (ZVL) (Zostavax) - HISTORICAL DATA 11/01/2014       SOCIAL HISTORY:   Social History     Tobacco Use    Smoking status: Never    Smokeless tobacco: Never    Tobacco comments:     second hand smoke parents - smoked for only 1 year many years ago   Vaping Use    Vaping Use: Never used   Substance Use Topics    Alcohol use: Not Currently     Alcohol/week: 0.6 oz     Types: 1 Shots of liquor per week     Comment: gin and half a lime; tonic water    Drug use: Yes     Types: Marijuana, Inhaled     Comment: medical marijuana through bong/vape       FAMILY HISTORY:  Family History   Problem Relation Age of Onset    Lung Disease Mother         copd    Diabetes Father     Heart Disease Father     Diabetes Sister     Cancer Maternal Aunt 40        breast        OBJECTIVE:     Vital Signs: /80 (BP Location: Right arm, Patient Position: Sitting, BP Cuff Size: Adult)   Pulse 65   Temp 36.3 °C (97.3 °F) (Temporal)   Resp 16   Ht 1.829 m (6')   Wt 73.9 kg (163 lb)   SpO2 94% Body mass index is 22.11 kg/m².    General: Appears well and comfortable  Eyes: No scleral or conjunctival lesions  ENT: No apparent oral or nasal lesions  Head/Neck: No apparent scalp or neck lesions  Cardiovascular: Regular rate and rhythm  Respiratory: Breathing quiet and unlabored  Gastrointestinal: No organomegaly or abdominal masses  Integumentary: Mild malar rash on face; significant erythema of forearms (reportedly from dermatology treatment); multiple tiny erythematous papules on upper and lower extremities  Musculoskeletal: Poorly localized mild tenderness of hands (with mild prominence of MCP and PIP joints), elbows, shoulders, knees, ankles, feet, and large muscle groups of the upper/lower extremities, chest wall, neck, and upper/mid/lower back; overall range of  motion somewhat limited by pain  Neurologic: No focal sensory or motor deficits  Psychiatric: Mood and affect appropriate      LABORATORY RESULTS REVIEWED AND INTERPRETED BY ME:  Lab Results   Component Value Date/Time    SEDRATEWES 27 (H) 06/06/2022 08:45 AM    CREACTPROT 0.84 (H) 06/06/2022 08:45 AM    URICACID 8.1 10/03/2011 11:08 AM     Lab Results   Component Value Date/Time    RHEUMFACTN 11 06/06/2022 08:45 AM    CCPANTIBODY 8 07/28/2022 04:31 PM    PDSB82GZYR Negative 07/28/2022 04:31 PM     Lab Results   Component Value Date/Time    ANTINUCAB Detected (A) 06/06/2022 08:45 AM    ANADIRECT Negative 10/20/2010 11:00 AM    ANAPATTERN <1:40 01/07/2009 06:15 PM    W5BMNVDEYJZ 142.2 07/28/2022 04:31 PM    J6AWODBDFGI 46.9 07/28/2022 04:31 PM     Lab Results   Component Value Date/Time    ANTIDNADS 18 06/06/2022 08:45 AM    SMITHAB 1 06/06/2022 08:45 AM    RNPAB 4 06/06/2022 08:45 AM    SZDRQVC99 0 06/06/2022 08:45 AM    SSA60 0 06/06/2022 08:45 AM    SSA52 0 06/06/2022 08:45 AM    ANTISSBSJ 0 06/06/2022 08:45 AM    JO1AB 3 06/06/2022 08:45 AM     Lab Results   Component Value Date/Time    IGACARDIOLI 24 (H) 10/26/2021 07:49 AM    IGMCARDIOLI <10 10/26/2021 07:49 AM    IGGCARDIOLI 19 (H) 10/26/2021 07:49 AM    PROTHROMBTM 14.3 05/23/2022 02:36 AM    INR 1.20 (H) 05/23/2022 02:36 AM     Lab Results   Component Value Date/Time     (H) 11/01/2021 07:53 AM    IGG 1169 11/01/2021 07:53 AM     Lab Results   Component Value Date/Time    ANTIMITOCHO 3.5 07/22/2016 12:41 PM    FACTIN 11 07/22/2016 12:41 PM     Lab Results   Component Value Date/Time    MICROSOMALA <9.0 07/28/2022 04:31 PM    ANTITHYROGL <0.9 07/28/2022 04:31 PM    TSH 0.922 10/20/2010 11:00 AM    TSHULTRASEN 0.630 07/14/2022 11:23 AM    FREEDIR 1.28 10/20/2010 11:00 AM    FREET4 1.27 07/14/2022 11:23 AM     Lab Results   Component Value Date/Time    HEPBSAG Negative 07/22/2016 12:41 PM    HEPBCORIGM Negative 07/22/2016 12:41 PM    HEPCAB Negative  07/22/2016 12:41 PM     Lab Results   Component Value Date/Time    CPKTOTAL 41 05/05/2022 09:43 AM    ALDOLASE 4.2 01/21/2008 03:43 AM    MYOGLOBIN 73 07/17/2008 03:48 PM     Lab Results   Component Value Date/Time    ASTSGOT 24 09/09/2022 02:29 PM    ALTSGPT 12 09/09/2022 02:29 PM    ALKPHOSPHAT 118 (H) 09/09/2022 02:29 PM    TBILIRUBIN 0.4 09/09/2022 02:29 PM    TOTPROTEIN 8.1 09/09/2022 02:29 PM    TOTPROTEIN 7.1 11/01/2021 07:53 AM    ALBUMIN 4.8 09/09/2022 02:29 PM    ALBUMIN 3.83 11/01/2021 07:53 AM     Lab Results   Component Value Date/Time    SODIUM 140 09/09/2022 02:29 PM    POTASSIUM 4.2 09/09/2022 02:29 PM    CHLORIDE 105 09/09/2022 02:29 PM    CO2 22 09/09/2022 02:29 PM    GLUCOSE 101 (H) 09/09/2022 02:29 PM    BUN 22 09/09/2022 02:29 PM    CREATININE 1.00 09/09/2022 02:29 PM    CREATININE 0.89 02/10/2010 04:04 PM    BUNCREATRAT 17 02/10/2010 04:04 PM    GLOMRATE >59 02/10/2010 04:04 PM    CALCIUM 10.3 09/09/2022 02:29 PM    MAGNESIUM 2.0 09/09/2022 02:29 PM     Lab Results   Component Value Date/Time    WBC 9.1 09/09/2022 02:26 PM    WBC 7.9 02/10/2010 04:04 PM    RBC 4.96 09/09/2022 02:26 PM    RBC 4.86 02/10/2010 04:04 PM    HEMOGLOBIN 15.1 09/09/2022 02:26 PM    HEMATOCRIT 45.6 09/09/2022 02:26 PM    MCV 91.9 09/09/2022 02:26 PM    MCV 86 02/10/2010 04:04 PM    MCH 30.4 09/09/2022 02:26 PM    MCH 27.8 02/10/2010 04:04 PM    MCHC 33.1 (L) 09/09/2022 02:26 PM    RDW 41.1 09/09/2022 02:26 PM    RDW 14.6 02/10/2010 04:04 PM    PLATELETCT 220 09/09/2022 02:26 PM    MPV 11.4 09/09/2022 02:26 PM    NEUTS 6.67 09/09/2022 02:26 PM    NEUTS 4.8 02/10/2010 04:04 PM    POLYS 53 01/28/2021 02:40 PM    LYMPHOCYTES 17.20 (L) 09/09/2022 02:26 PM    MONOCYTES 6.50 09/09/2022 02:26 PM    EOSINOPHILS 1.70 09/09/2022 02:26 PM    EOSINOPHILS 34 01/28/2021 02:40 PM    BASOPHILS 0.90 09/09/2022 02:26 PM    HYPOCHROMIA 1+ 03/15/2014 10:02 AM     Lab Results   Component Value Date/Time    FERRITIN 144.0 05/07/2020 01:34 PM     IRON 144 05/07/2020 01:34 PM    FOLATE 16.4 04/28/2021 07:59 AM     Lab Results   Component Value Date/Time    25HYDROXY 40 07/14/2022 11:23 AM     Lab Results   Component Value Date/Time    COLORURINE Yellow 08/05/2022 10:47 PM    SPECGRAVITY 1.010 08/05/2022 10:47 PM    PHURINE 5.5 08/05/2022 10:47 PM    GLUCOSEUR Negative 08/05/2022 10:47 PM    KETONES Negative 08/05/2022 10:47 PM    PROTEINURIN Negative 08/05/2022 10:47 PM     Lab Results   Component Value Date/Time    TOTALVOLUME 500 10/14/2021 05:00 AM    TOTPROTUR 6 10/14/2021 05:00 AM    VYAKGPUS06 30 (L) 10/14/2021 05:00 AM     Lab Results   Component Value Date/Time    FLTYPE Peritoneal 01/28/2021 02:40 PM     Lab Results   Component Value Date/Time    CHOLSTRLTOT 194 05/23/2022 02:36 AM    LDL 90 05/23/2022 02:36 AM    HDL 81 05/23/2022 02:36 AM    TRIGLYCERIDE 113 05/23/2022 02:36 AM    HBA1C 5.3 08/27/2015 01:50 PM       RADIOLOGY RESULTS REVIEWED AND INTERPRETED BY ME:  Results for orders placed in visit on 07/27/22    DX-JOINT SURVEY-HANDS SINGLE VIEW    Impression  1. No radiographic evidence of inflammatory arthropathy.    Results for orders placed in visit on 09/24/15    DS-BONE DENSITY STUDY (DEXA)    Impression  According to the World Health Organization classification, bone mineral density of this patient is normal for the lumbar spine and osteopenic for the left femur. Increase in bone density of the lumbar spine since the most recent exam is not statistically  significant. Decrease in bone density in the left femur since the most recent exam is statistically significant.    10-year Probability of Fracture:  Major Osteoporotic     15.0%  Hip     0.5%  Population      [place] ()    Based on left femur neck BMD    Results for orders placed during the hospital encounter of 04/10/15    LE VENOUS DUPLEX (DVT)    Results for orders placed during the hospital encounter of 12/27/17    ECHOCARDIOGRAM COMP W/O CONT      All relevant laboratory  and imaging results reported on this note were reviewed and interpreted by me.      ASSESSMENT AND PLAN:     Jana Overton is a 67 y.o. female with history as noted above whose presentation merits the following diagnostic and clinical status impressions and recommendations:    1. Drug-induced lupus erythematosus  Clinical picture in the context of her autoantibody profile (positive VAHID with anti-histone and anti-chromatin) is compatible with drug-induced lupus erythematosus presumably caused by her use of hydralazine. The present clinical evidence suggests active but relatively stable disease activity. Given the potential for discordance between clinical and immunologic disease activity, need to check inflammatory markers for complete assessment.  - Sed Rate  - CRP QUANTITIVE (NON-CARDIAC)  - Stop hydralazine (culprit of drug-induced lupus)  - Continue hydroxychloroquine 300 mg daily    2.  Fibromyalgia syndrome  Presumably the most significant contributor to her overall pain burden at this time.  - Having previously tried Neurontin and Lyrica, consider trial of Cymbalta or Savella  - Follow up with pain management as usual    3. Long-term use of hydroxychloroquine  Risk of retinal toxicity is considered minimal in this case given the low dose of hydroxychloroquine prescribed (less than 5 mg/kg of body weight) per rheumatology/ophthalmology guidelines. However, ophthalmologic evaluation is still recommended with the frequency of routine follow-up eye exams determined by the ophthalmologist, typically annually or every other year.  - Routine ophthalmology evaluation as recommended      FOLLOW-UP: Return in about 3 months (around 12/27/2022) for Short.           Thank you for the opportunity to participate in the care of Jana Overton.    Petar Lewis MD, MS  Rheumatologist ?  of Clinical Medicine  [facility] Rheumatology ? Department of Internal Medicine  [facility] ? [facility]  [facility]

## 2022-09-28 ENCOUNTER — OFFICE VISIT (OUTPATIENT)
Dept: MEDICAL GROUP | Facility: LAB | Age: 67
End: 2022-09-28
Payer: MEDICARE

## 2022-09-28 VITALS
DIASTOLIC BLOOD PRESSURE: 78 MMHG | BODY MASS INDEX: 21.81 KG/M2 | WEIGHT: 161 LBS | HEIGHT: 72 IN | HEART RATE: 77 BPM | SYSTOLIC BLOOD PRESSURE: 130 MMHG | RESPIRATION RATE: 16 BRPM | TEMPERATURE: 98.4 F | OXYGEN SATURATION: 97 %

## 2022-09-28 DIAGNOSIS — K50.019 CROHN'S DISEASE OF SMALL INTESTINE WITH COMPLICATION (HCC): ICD-10-CM

## 2022-09-28 DIAGNOSIS — M79.7 FIBROMYALGIA SYNDROME: ICD-10-CM

## 2022-09-28 DIAGNOSIS — K22.2 ESOPHAGEAL STRICTURE: ICD-10-CM

## 2022-09-28 PROCEDURE — 99214 OFFICE O/P EST MOD 30 MIN: CPT | Performed by: INTERNAL MEDICINE

## 2022-09-28 ASSESSMENT — FIBROSIS 4 INDEX: FIB4 SCORE: 2.17

## 2022-09-28 NOTE — PROGRESS NOTES
CC: Jana Overton is a 67 y.o. female is suffering from   Chief Complaint   Patient presents with    Follow-Up         SUBJECTIVE:  1. Esophageal stricture  Jana is here for follow-up is having problems with esophageal stricture, previous barium swallow study was evaluated.  Highly recommend she be seen as soon as possible for esophageal stricturing.  Patient is very happy with her evaluation by rheumatology    2. Fibromyalgia syndrome  Fibromyalgia syndrome continue current medical therapy, patient has been discontinued from hydralazine because of concerns about possible lupus induced by this medication    3. Crohn's disease of small intestine with complication (HCC)  Crohn's disease status post colostomy, clinically stable, patient is pending refill of rifaximin.         Past social, family, history: , Goyo  Social History     Tobacco Use    Smoking status: Never    Smokeless tobacco: Never    Tobacco comments:     second hand smoke parents - smoked for only 1 year many years ago   Vaping Use    Vaping Use: Never used   Substance Use Topics    Alcohol use: Not Currently     Alcohol/week: 0.6 oz     Types: 1 Shots of liquor per week     Comment: gin and half a lime; tonic water    Drug use: Yes     Types: Marijuana, Inhaled     Comment: medical marijuana through bong/vape         MEDICATIONS:    Current Outpatient Medications:     prochlorperazine (COMPAZINE) 10 MG Tab, Take 1 Tablet by mouth 1 time a day as needed for Nausea/Vomiting., Disp: 20 Tablet, Rfl: 0    riFAXIMin (XIFAXAN) 200 MG Tab, Take 1 Tablet by mouth 3 times a day., Disp: 42 Tablet, Rfl: 0    diazePAM (VALIUM) 5 MG Tab, Take 1 Tablet by mouth every 6 hours as needed for Anxiety for up to 30 days., Disp: 30 Tablet, Rfl: 0    ondansetron (ZOFRAN) 4 MG Tab tablet, 1 tablet, Disp: , Rfl:     acyclovir (ZOVIRAX) 400 MG tablet, , Disp: , Rfl:     Azelaic Acid 15 % Gel, azelaic acid 15 % topical gel, Disp: , Rfl:     cyclobenzaprine (FLEXERIL)  10 mg Tab, cyclobenzaprine 10 mg tablet  TAKE 1 TABLET BY MOUTH THREE TIMES DAILY AS NEEDED, Disp: , Rfl:     methocarbamol (ROBAXIN) 500 MG Tab, Take 500 mg by mouth 2 times a day as needed., Disp: , Rfl:     nystatin (MYCOSTATIN) 689723 UNIT/ML Suspension, nystatin 100,000 unit/mL oral suspension  SHAKE LIQUID AND TAKE 5 ML BY MOUTH FOUR TIMES DAILY FOR 4 DAYS, Disp: , Rfl:     triamcinolone acetonide (KENALOG) 0.1 % Cream, , Disp: , Rfl:     spironolactone (ALDACTONE) 25 MG Tab, Take 0.5 Tablets by mouth every day., Disp: 45 Tablet, Rfl: 3    metoprolol tartrate (LOPRESSOR) 50 MG Tab, TAKE ONE TABLET BY MOUTH TWICE A DAY **HOLD IF BLOOD PRESSURE LESS THAN 95/50**, Disp: 180 Tablet, Rfl: 3    granisetron (KYTRIL) 1 MG Tab, Take 1 Tablet by mouth every 12 hours as needed for Nausea/Vomiting., Disp: 10 Tablet, Rfl: 0    hydroxychloroquine (PLAQUENIL) 200 MG Tab, Take 1.5 Tablets by mouth every day., Disp: 135 Tablet, Rfl: 3    traZODone (DESYREL) 50 MG Tab, , Disp: , Rfl:     naratriptan (AMERGE) 2.5 MG tablet, Take 1 Tablet by mouth as needed for Migraine., Disp: 5 Tablet, Rfl: 3    hydrALAZINE (APRESOLINE) 10 MG Tab, Take 1 Tablet by mouth 3 times a day as needed (systolic blood pressure >150.)., Disp: 270 Tablet, Rfl: 3    furosemide (LASIX) 20 MG Tab, Take 1 Tablet by mouth 1 time a day as needed (leg swelling/weight gain)., Disp: 90 Tablet, Rfl: 3    potassium chloride SA (KDUR) 20 MEQ Tab CR, Take 1 Tablet by mouth 1 time a day as needed (when you take lasix.)., Disp: 90 Tablet, Rfl: 3    ondansetron (ZOFRAN ODT) 4 MG TABLET DISPERSIBLE, Take 1 Tablet by mouth every 6 hours as needed for Nausea., Disp: 20 Tablet, Rfl: 0    Probiotic Product (PROBIOTIC PO), Take 2 Capsules by mouth every day., Disp: , Rfl:     nitrofurantoin (MACROBID) 100 MG Cap, Macrobid 100 mg capsule  Take 1 capsule every 12 hours by oral route as directed for 5 days. 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    Acute encephalopathy 05/21/2022    Acute cystitis without hematuria 12/07/2021    Ureteric stone 12/07/2021    Migraine 06/04/2019    FLORES (acute kidney injury) (formerly [facility]) 06/01/2019    Essential hypertension 05/20/2019    SIRS (systemic inflammatory response syndrome) (formerly [facility]) 05/19/2019    High anion gap metabolic acidosis 05/19/2019    DVT (deep venous thrombosis) (formerly [facility]) 12/31/2018    History of MRSA infection 12/29/2018    History of infection with vancomycin resistant Enterococcus (VRE) 12/29/2018    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formerly [facility]) 12/28/2018    Dysphasia 12/28/2018    Anemia 12/15/2018    Thrush of mouth and esophagus (formerly [facility]) 12/13/2018    Hypokalemia 12/13/2018    Liver enzyme elevation 12/12/2018    Leukocytosis 12/12/2018    Chronic respiratory failure with hypoxia (formerly [facility]) 03/20/2018    Anxiety disorder due to multiple medical problems 12/01/2017    DDD (degenerative disc disease), lumbar 04/12/2017    History of DVT (deep vein thrombosis) 11/23/2016    Paroxysmal atrial fibrillation (formerly [facility]) 03/26/2015    Sleep apnea 10/26/2014    Postherpetic neuralgia 06/24/2014    Multiple falls 06/24/2014    COPD (chronic obstructive pulmonary disease) (formerly [facility]) 06/24/2014    Rosacea 06/24/2014    Ileostomy in place (formerly [facility]) 06/26/2013    GERD (gastroesophageal reflux disease) 12/05/2012    Status post lumbar surgery 07/16/2012    Crohn's disease of small intestine with complication (formerly [facility])  09/23/2009    Central sensitization to pain 09/23/2009    Opioid type dependence, continuous (CMS-HCC) 09/23/2009    Degenerative joint disease of cervical and lumbar spine 09/23/2009       REVIEW OF SYSTEMS:  Gen.:  No Nausea, Vomiting, fever, Chills.  Heart: No chest pain.  Lungs:  No shortness of Breath.  Psychological: Rg unusual Anxiety depression     PHYSICAL EXAM   Constitutional: Alert, cooperative, not in acute distress.  Cardiovascular:  Rate Rhythm is regular without murmurs rubs clicks.     Thorax & Lungs: Clear to auscultation, no wheezing, rhonchi, or rales  HENT: Normocephalic, Atraumatic.  Eyes: PERRLA, EOMI, Conjunctiva normal.   Neck: Trachia is midline no swelling of the thyroid.   Lymphatic: No lymphadenopathy noted.   Neurologic: Alert & oriented x 3, cranial nerves II through XII are intact, Normal motor function, Normal sensory function, No focal deficits noted.   Psychiatric: Affect normal, Judgment normal, Mood normal.     VITAL SIGNS:/78   Pulse 77   Temp 36.9 °C (98.4 °F) (Temporal)   Resp 16   Ht 1.829 m (6')   Wt 73 kg (161 lb)   SpO2 97%   BMI 21.84 kg/m²     Labs: Reviewed    Assessment:                                                     Plan:    1. Esophageal stricture  Severe esophageal stricturing urgent request sent to gastroenterology, emergency room precautions given to the patient  - Referral to Gastroenterology    2. Fibromyalgia syndrome  Ongoing fibromyalgia consultation from rheumatology reviewed    3. Crohn's disease of small intestine with complication (HCC)  Continue current medical therapy awaiting complete refill of Rifaximin.

## 2022-09-29 ENCOUNTER — TELEPHONE (OUTPATIENT)
Dept: MEDICAL GROUP | Facility: LAB | Age: 67
End: 2022-09-29
Payer: MEDICARE

## 2022-09-29 NOTE — ADDENDUM NOTE
Addended by: HANK CANSECO on: 9/28/2022 05:34 PM     Modules accepted: Orders    
Detail Level: Detailed
Detail Level: Zone
Detail Level: Simple

## 2022-10-11 ENCOUNTER — TELEPHONE (OUTPATIENT)
Dept: MEDICAL GROUP | Facility: LAB | Age: 67
End: 2022-10-11
Payer: MEDICARE

## 2022-10-11 ENCOUNTER — HOSPITAL ENCOUNTER (OUTPATIENT)
Dept: LAB | Facility: MEDICAL CENTER | Age: 67
End: 2022-10-11
Attending: INTERNAL MEDICINE
Payer: MEDICARE

## 2022-10-11 ENCOUNTER — HOSPITAL ENCOUNTER (OUTPATIENT)
Dept: RADIOLOGY | Facility: MEDICAL CENTER | Age: 67
End: 2022-10-11
Attending: NURSE PRACTITIONER
Payer: MEDICARE

## 2022-10-11 DIAGNOSIS — E86.0 DEHYDRATION: ICD-10-CM

## 2022-10-11 DIAGNOSIS — M47.812 CERVICAL SPONDYLOSIS WITHOUT MYELOPATHY: ICD-10-CM

## 2022-10-11 LAB
ANION GAP SERPL CALC-SCNC: 11 MMOL/L (ref 7–16)
BUN SERPL-MCNC: 14 MG/DL (ref 8–22)
CALCIUM SERPL-MCNC: 9.6 MG/DL (ref 8.4–10.2)
CHLORIDE SERPL-SCNC: 102 MMOL/L (ref 96–112)
CO2 SERPL-SCNC: 27 MMOL/L (ref 20–33)
CREAT SERPL-MCNC: 0.98 MG/DL (ref 0.5–1.4)
CRP SERPL HS-MCNC: 1.04 MG/DL (ref 0–0.75)
ERYTHROCYTE [SEDIMENTATION RATE] IN BLOOD BY WESTERGREN METHOD: 45 MM/HOUR (ref 0–25)
GFR SERPLBLD CREATININE-BSD FMLA CKD-EPI: 62 ML/MIN/1.73 M 2
GLUCOSE SERPL-MCNC: 94 MG/DL (ref 65–99)
POTASSIUM SERPL-SCNC: 3.7 MMOL/L (ref 3.6–5.5)
SODIUM SERPL-SCNC: 140 MMOL/L (ref 135–145)

## 2022-10-11 PROCEDURE — 80048 BASIC METABOLIC PNL TOTAL CA: CPT

## 2022-10-11 PROCEDURE — 85652 RBC SED RATE AUTOMATED: CPT

## 2022-10-11 PROCEDURE — 72125 CT NECK SPINE W/O DYE: CPT

## 2022-10-11 PROCEDURE — 36415 COLL VENOUS BLD VENIPUNCTURE: CPT

## 2022-10-11 PROCEDURE — 86140 C-REACTIVE PROTEIN: CPT

## 2022-10-11 NOTE — TELEPHONE ENCOUNTER
Jana is at the [facility] and was wondering if you wanted to add any labs on while she is there

## 2022-10-19 ENCOUNTER — TELEPHONE (OUTPATIENT)
Dept: MEDICAL GROUP | Facility: LAB | Age: 67
End: 2022-10-19
Payer: MEDICARE

## 2022-10-19 ENCOUNTER — APPOINTMENT (OUTPATIENT)
Dept: RADIOLOGY | Facility: MEDICAL CENTER | Age: 67
End: 2022-10-19
Attending: NURSE PRACTITIONER
Payer: MEDICARE

## 2022-10-19 DIAGNOSIS — M53.3 SACROILIAC DYSFUNCTION: ICD-10-CM

## 2022-10-19 PROCEDURE — 72195 MRI PELVIS W/O DYE: CPT

## 2022-10-21 ENCOUNTER — OFFICE VISIT (OUTPATIENT)
Dept: MEDICAL GROUP | Facility: LAB | Age: 67
End: 2022-10-21
Payer: MEDICARE

## 2022-10-21 ENCOUNTER — HOSPITAL ENCOUNTER (INPATIENT)
Facility: MEDICAL CENTER | Age: 67
LOS: 2 days | DRG: 392 | End: 2022-10-24
Attending: EMERGENCY MEDICINE | Admitting: STUDENT IN AN ORGANIZED HEALTH CARE EDUCATION/TRAINING PROGRAM
Payer: MEDICARE

## 2022-10-21 VITALS
HEIGHT: 72 IN | DIASTOLIC BLOOD PRESSURE: 64 MMHG | BODY MASS INDEX: 21.35 KG/M2 | SYSTOLIC BLOOD PRESSURE: 110 MMHG | WEIGHT: 157.6 LBS | HEART RATE: 88 BPM | OXYGEN SATURATION: 93 % | TEMPERATURE: 97.1 F

## 2022-10-21 DIAGNOSIS — R11.2 NAUSEA AND VOMITING, UNSPECIFIED VOMITING TYPE: ICD-10-CM

## 2022-10-21 DIAGNOSIS — K22.2 ESOPHAGEAL STRICTURE: ICD-10-CM

## 2022-10-21 DIAGNOSIS — R13.12 OROPHARYNGEAL DYSPHAGIA: ICD-10-CM

## 2022-10-21 PROBLEM — R13.10 DYSPHAGIA: Status: ACTIVE | Noted: 2022-10-21

## 2022-10-21 LAB
ALBUMIN SERPL BCP-MCNC: 4.4 G/DL (ref 3.2–4.9)
ALBUMIN/GLOB SERPL: 1.5 G/DL
ALP SERPL-CCNC: 116 U/L (ref 30–99)
ALT SERPL-CCNC: 18 U/L (ref 2–50)
ANION GAP SERPL CALC-SCNC: 12 MMOL/L (ref 7–16)
AST SERPL-CCNC: 25 U/L (ref 12–45)
BASOPHILS # BLD AUTO: 1 % (ref 0–1.8)
BASOPHILS # BLD: 0.08 K/UL (ref 0–0.12)
BILIRUB SERPL-MCNC: 0.4 MG/DL (ref 0.1–1.5)
BUN SERPL-MCNC: 19 MG/DL (ref 8–22)
CALCIUM SERPL-MCNC: 9.5 MG/DL (ref 8.5–10.5)
CHLORIDE SERPL-SCNC: 105 MMOL/L (ref 96–112)
CO2 SERPL-SCNC: 20 MMOL/L (ref 20–33)
CREAT SERPL-MCNC: 1.04 MG/DL (ref 0.5–1.4)
EOSINOPHIL # BLD AUTO: 0.12 K/UL (ref 0–0.51)
EOSINOPHIL NFR BLD: 1.4 % (ref 0–6.9)
ERYTHROCYTE [DISTWIDTH] IN BLOOD BY AUTOMATED COUNT: 41.5 FL (ref 35.9–50)
GFR SERPLBLD CREATININE-BSD FMLA CKD-EPI: 58 ML/MIN/1.73 M 2
GLOBULIN SER CALC-MCNC: 2.9 G/DL (ref 1.9–3.5)
GLUCOSE SERPL-MCNC: 96 MG/DL (ref 65–99)
HCT VFR BLD AUTO: 39.5 % (ref 37–47)
HGB BLD-MCNC: 13.1 G/DL (ref 12–16)
IMM GRANULOCYTES # BLD AUTO: 0.04 K/UL (ref 0–0.11)
IMM GRANULOCYTES NFR BLD AUTO: 0.5 % (ref 0–0.9)
LIPASE SERPL-CCNC: 38 U/L (ref 11–82)
LYMPHOCYTES # BLD AUTO: 1.31 K/UL (ref 1–4.8)
LYMPHOCYTES NFR BLD: 15.6 % (ref 22–41)
MCH RBC QN AUTO: 29.9 PG (ref 27–33)
MCHC RBC AUTO-ENTMCNC: 33.2 G/DL (ref 33.6–35)
MCV RBC AUTO: 90.2 FL (ref 81.4–97.8)
MONOCYTES # BLD AUTO: 0.6 K/UL (ref 0–0.85)
MONOCYTES NFR BLD AUTO: 7.2 % (ref 0–13.4)
NEUTROPHILS # BLD AUTO: 6.24 K/UL (ref 2–7.15)
NEUTROPHILS NFR BLD: 74.3 % (ref 44–72)
NRBC # BLD AUTO: 0 K/UL
NRBC BLD-RTO: 0 /100 WBC
PLATELET # BLD AUTO: 233 K/UL (ref 164–446)
PMV BLD AUTO: 10.4 FL (ref 9–12.9)
POTASSIUM SERPL-SCNC: 4 MMOL/L (ref 3.6–5.5)
PROT SERPL-MCNC: 7.3 G/DL (ref 6–8.2)
RBC # BLD AUTO: 4.38 M/UL (ref 4.2–5.4)
SODIUM SERPL-SCNC: 137 MMOL/L (ref 135–145)
WBC # BLD AUTO: 8.4 K/UL (ref 4.8–10.8)

## 2022-10-21 PROCEDURE — 700105 HCHG RX REV CODE 258: Performed by: EMERGENCY MEDICINE

## 2022-10-21 PROCEDURE — 99220 PR INITIAL OBSERVATION CARE,LEVL III: CPT | Performed by: STUDENT IN AN ORGANIZED HEALTH CARE EDUCATION/TRAINING PROGRAM

## 2022-10-21 PROCEDURE — 700105 HCHG RX REV CODE 258: Performed by: STUDENT IN AN ORGANIZED HEALTH CARE EDUCATION/TRAINING PROGRAM

## 2022-10-21 PROCEDURE — 700102 HCHG RX REV CODE 250 W/ 637 OVERRIDE(OP): Performed by: STUDENT IN AN ORGANIZED HEALTH CARE EDUCATION/TRAINING PROGRAM

## 2022-10-21 PROCEDURE — 36415 COLL VENOUS BLD VENIPUNCTURE: CPT

## 2022-10-21 PROCEDURE — 96375 TX/PRO/DX INJ NEW DRUG ADDON: CPT

## 2022-10-21 PROCEDURE — G0378 HOSPITAL OBSERVATION PER HR: HCPCS

## 2022-10-21 PROCEDURE — A9270 NON-COVERED ITEM OR SERVICE: HCPCS | Performed by: STUDENT IN AN ORGANIZED HEALTH CARE EDUCATION/TRAINING PROGRAM

## 2022-10-21 PROCEDURE — 700111 HCHG RX REV CODE 636 W/ 250 OVERRIDE (IP): Performed by: EMERGENCY MEDICINE

## 2022-10-21 PROCEDURE — 85025 COMPLETE CBC W/AUTO DIFF WBC: CPT

## 2022-10-21 PROCEDURE — 99214 OFFICE O/P EST MOD 30 MIN: CPT | Performed by: INTERNAL MEDICINE

## 2022-10-21 PROCEDURE — 99285 EMERGENCY DEPT VISIT HI MDM: CPT

## 2022-10-21 PROCEDURE — 700111 HCHG RX REV CODE 636 W/ 250 OVERRIDE (IP): Performed by: STUDENT IN AN ORGANIZED HEALTH CARE EDUCATION/TRAINING PROGRAM

## 2022-10-21 PROCEDURE — 83690 ASSAY OF LIPASE: CPT

## 2022-10-21 PROCEDURE — 80053 COMPREHEN METABOLIC PANEL: CPT

## 2022-10-21 PROCEDURE — 96374 THER/PROPH/DIAG INJ IV PUSH: CPT

## 2022-10-21 RX ORDER — METOPROLOL TARTRATE 50 MG/1
50 TABLET, FILM COATED ORAL 2 TIMES DAILY
COMMUNITY
End: 2022-11-18

## 2022-10-21 RX ORDER — OXYCODONE HYDROCHLORIDE 10 MG/1
10 TABLET ORAL EVERY 6 HOURS PRN
Status: DISCONTINUED | OUTPATIENT
Start: 2022-10-21 | End: 2022-10-22

## 2022-10-21 RX ORDER — TRAZODONE HYDROCHLORIDE 50 MG/1
50 TABLET ORAL EVERY EVENING
Status: DISCONTINUED | OUTPATIENT
Start: 2022-10-21 | End: 2022-10-24 | Stop reason: HOSPADM

## 2022-10-21 RX ORDER — GUAIFENESIN/DEXTROMETHORPHAN 100-10MG/5
10 SYRUP ORAL EVERY 6 HOURS PRN
Status: DISCONTINUED | OUTPATIENT
Start: 2022-10-21 | End: 2022-10-24 | Stop reason: HOSPADM

## 2022-10-21 RX ORDER — FUROSEMIDE 20 MG/1
20 TABLET ORAL
Status: DISCONTINUED | OUTPATIENT
Start: 2022-10-21 | End: 2022-10-24 | Stop reason: HOSPADM

## 2022-10-21 RX ORDER — CHOLECALCIFEROL (VITAMIN D3) 125 MCG
5 CAPSULE ORAL
Status: DISCONTINUED | OUTPATIENT
Start: 2022-10-21 | End: 2022-10-24 | Stop reason: HOSPADM

## 2022-10-21 RX ORDER — CHOLECALCIFEROL (VITAMIN D3) 125 MCG
5 CAPSULE ORAL
COMMUNITY
End: 2023-01-15

## 2022-10-21 RX ORDER — DIAZEPAM 5 MG/1
5 TABLET ORAL EVERY 6 HOURS PRN
Status: DISCONTINUED | OUTPATIENT
Start: 2022-10-21 | End: 2022-10-24 | Stop reason: HOSPADM

## 2022-10-21 RX ORDER — METOPROLOL TARTRATE 50 MG/1
50 TABLET, FILM COATED ORAL 2 TIMES DAILY
Status: DISCONTINUED | OUTPATIENT
Start: 2022-10-21 | End: 2022-10-24 | Stop reason: HOSPADM

## 2022-10-21 RX ORDER — HYDROMORPHONE HYDROCHLORIDE 1 MG/ML
0.5 INJECTION, SOLUTION INTRAMUSCULAR; INTRAVENOUS; SUBCUTANEOUS ONCE
Status: COMPLETED | OUTPATIENT
Start: 2022-10-21 | End: 2022-10-21

## 2022-10-21 RX ORDER — SPIRONOLACTONE 25 MG/1
50 TABLET ORAL DAILY
Status: DISCONTINUED | OUTPATIENT
Start: 2022-10-22 | End: 2022-10-24 | Stop reason: HOSPADM

## 2022-10-21 RX ORDER — SODIUM CHLORIDE 9 MG/ML
INJECTION, SOLUTION INTRAVENOUS CONTINUOUS
Status: DISCONTINUED | OUTPATIENT
Start: 2022-10-21 | End: 2022-10-22

## 2022-10-21 RX ORDER — POLYETHYLENE GLYCOL 3350 17 G/17G
1 POWDER, FOR SOLUTION ORAL
Status: DISCONTINUED | OUTPATIENT
Start: 2022-10-21 | End: 2022-10-22

## 2022-10-21 RX ORDER — ONDANSETRON 4 MG/1
4 TABLET, ORALLY DISINTEGRATING ORAL EVERY 4 HOURS PRN
Status: DISCONTINUED | OUTPATIENT
Start: 2022-10-21 | End: 2022-10-21

## 2022-10-21 RX ORDER — SPIRONOLACTONE 25 MG/1
25 TABLET ORAL DAILY
COMMUNITY
End: 2023-09-28 | Stop reason: SDUPTHER

## 2022-10-21 RX ORDER — ONDANSETRON 4 MG/1
4 TABLET, ORALLY DISINTEGRATING ORAL EVERY 12 HOURS PRN
Status: DISCONTINUED | OUTPATIENT
Start: 2022-10-21 | End: 2022-10-24 | Stop reason: HOSPADM

## 2022-10-21 RX ORDER — DIAZEPAM 5 MG/ML
5 INJECTION, SOLUTION INTRAMUSCULAR; INTRAVENOUS ONCE
Status: COMPLETED | OUTPATIENT
Start: 2022-10-21 | End: 2022-10-21

## 2022-10-21 RX ORDER — ONDANSETRON 2 MG/ML
4 INJECTION INTRAMUSCULAR; INTRAVENOUS EVERY 4 HOURS PRN
Status: DISCONTINUED | OUTPATIENT
Start: 2022-10-21 | End: 2022-10-24 | Stop reason: HOSPADM

## 2022-10-21 RX ORDER — LABETALOL HYDROCHLORIDE 5 MG/ML
10 INJECTION, SOLUTION INTRAVENOUS EVERY 4 HOURS PRN
Status: DISCONTINUED | OUTPATIENT
Start: 2022-10-21 | End: 2022-10-24 | Stop reason: HOSPADM

## 2022-10-21 RX ORDER — ONDANSETRON 2 MG/ML
4 INJECTION INTRAMUSCULAR; INTRAVENOUS ONCE
Status: COMPLETED | OUTPATIENT
Start: 2022-10-21 | End: 2022-10-21

## 2022-10-21 RX ORDER — OXYCODONE HYDROCHLORIDE 10 MG/1
10 TABLET ORAL EVERY 6 HOURS PRN
COMMUNITY
End: 2022-12-29 | Stop reason: SDUPTHER

## 2022-10-21 RX ORDER — HYDROXYCHLOROQUINE SULFATE 200 MG/1
300 TABLET, FILM COATED ORAL DAILY
Status: DISCONTINUED | OUTPATIENT
Start: 2022-10-22 | End: 2022-10-24 | Stop reason: HOSPADM

## 2022-10-21 RX ORDER — ACETAMINOPHEN 325 MG/1
650 TABLET ORAL EVERY 6 HOURS PRN
Status: DISCONTINUED | OUTPATIENT
Start: 2022-10-21 | End: 2022-10-24 | Stop reason: HOSPADM

## 2022-10-21 RX ORDER — AMOXICILLIN 250 MG
2 CAPSULE ORAL 2 TIMES DAILY
Status: DISCONTINUED | OUTPATIENT
Start: 2022-10-21 | End: 2022-10-22

## 2022-10-21 RX ORDER — BISACODYL 10 MG
10 SUPPOSITORY, RECTAL RECTAL
Status: DISCONTINUED | OUTPATIENT
Start: 2022-10-21 | End: 2022-10-22

## 2022-10-21 RX ORDER — SODIUM CHLORIDE 9 MG/ML
1000 INJECTION, SOLUTION INTRAVENOUS ONCE
Status: COMPLETED | OUTPATIENT
Start: 2022-10-21 | End: 2022-10-21

## 2022-10-21 RX ORDER — DIAZEPAM 5 MG/1
5 TABLET ORAL EVERY 6 HOURS PRN
COMMUNITY
End: 2022-10-26

## 2022-10-21 RX ADMIN — DIAZEPAM 5 MG: 5 INJECTION, SOLUTION INTRAMUSCULAR; INTRAVENOUS at 23:21

## 2022-10-21 RX ADMIN — ONDANSETRON 4 MG: 2 INJECTION INTRAMUSCULAR; INTRAVENOUS at 19:05

## 2022-10-21 RX ADMIN — HYDROMORPHONE HYDROCHLORIDE 0.5 MG: 1 INJECTION, SOLUTION INTRAMUSCULAR; INTRAVENOUS; SUBCUTANEOUS at 19:05

## 2022-10-21 RX ADMIN — SODIUM CHLORIDE: 9 INJECTION, SOLUTION INTRAVENOUS at 22:05

## 2022-10-21 RX ADMIN — SODIUM CHLORIDE 1000 ML: 9 INJECTION, SOLUTION INTRAVENOUS at 18:44

## 2022-10-21 ASSESSMENT — LIFESTYLE VARIABLES
DOES PATIENT WANT TO STOP DRINKING: NO
AVERAGE NUMBER OF DAYS PER WEEK YOU HAVE A DRINK CONTAINING ALCOHOL: 1
EVER FELT BAD OR GUILTY ABOUT YOUR DRINKING: NO
TOTAL SCORE: 0
HAVE PEOPLE ANNOYED YOU BY CRITICIZING YOUR DRINKING: NO
CONSUMPTION TOTAL: NEGATIVE
TOTAL SCORE: 0
HAVE YOU EVER FELT YOU SHOULD CUT DOWN ON YOUR DRINKING: NO
EVER HAD A DRINK FIRST THING IN THE MORNING TO STEADY YOUR NERVES TO GET RID OF A HANGOVER: NO
TOTAL SCORE: 0
HOW MANY TIMES IN THE PAST YEAR HAVE YOU HAD 5 OR MORE DRINKS IN A DAY: 0
ALCOHOL_USE: YES
ON A TYPICAL DAY WHEN YOU DRINK ALCOHOL HOW MANY DRINKS DO YOU HAVE: 1

## 2022-10-21 ASSESSMENT — COGNITIVE AND FUNCTIONAL STATUS - GENERAL
HELP NEEDED FOR BATHING: A LITTLE
DRESSING REGULAR UPPER BODY CLOTHING: A LITTLE
CLIMB 3 TO 5 STEPS WITH RAILING: A LITTLE
MOVING FROM LYING ON BACK TO SITTING ON SIDE OF FLAT BED: A LITTLE
SUGGESTED CMS G CODE MODIFIER DAILY ACTIVITY: CJ
DAILY ACTIVITIY SCORE: 20
TOILETING: A LITTLE
WALKING IN HOSPITAL ROOM: A LITTLE
SUGGESTED CMS G CODE MODIFIER MOBILITY: CK
STANDING UP FROM CHAIR USING ARMS: A LITTLE
DRESSING REGULAR LOWER BODY CLOTHING: A LITTLE
MOBILITY SCORE: 19
MOVING TO AND FROM BED TO CHAIR: A LITTLE

## 2022-10-21 ASSESSMENT — FIBROSIS 4 INDEX
FIB4 SCORE: 2.17
FIB4 SCORE: 1.75
FIB4 SCORE: 2.17

## 2022-10-21 NOTE — PROGRESS NOTES
CC: Jana Overton is a 67 y.o. female is suffering from   Chief Complaint   Patient presents with    Follow-Up         SUBJECTIVE:  1. Esophageal stricture  Jana is here for follow-up, is having problems with esophageal stricturing, patient's previous upper GI with small bowel follow-through dated September 27, 2022 shows significant narrowing with dysmotility of the distal esophagus.    2. Nausea and vomiting, unspecified vomiting type  Patient states she is living on pudding, cannot eat solids.        Past social, family, history: , Goyo  Social History     Tobacco Use    Smoking status: Never    Smokeless tobacco: Never    Tobacco comments:     second hand smoke parents - smoked for only 1 year many years ago   Vaping Use    Vaping Use: Never used   Substance Use Topics    Alcohol use: Not Currently     Alcohol/week: 0.6 oz     Types: 1 Shots of liquor per week     Comment: gin and half a lime; tonic water    Drug use: Yes     Types: Marijuana, Inhaled     Comment: medical marijuana through bong/vape         MEDICATIONS:    Current Outpatient Medications:     diazePAM (VALIUM) 5 MG Tab, TAKE 1 TABLET BY MOUTH EVERY 6 HOURS AS NEEDED FOR ANXIETY FOR 30 DAYS, Disp: 30 Tablet, Rfl: 2    prochlorperazine (COMPAZINE) 10 MG Tab, Take 1 Tablet by mouth 1 time a day as needed for Nausea/Vomiting., Disp: 20 Tablet, Rfl: 0    riFAXIMin (XIFAXAN) 200 MG Tab, Take 1 Tablet by mouth 3 times a day., Disp: 42 Tablet, Rfl: 0    ondansetron (ZOFRAN) 4 MG Tab tablet, 1 tablet, Disp: , Rfl:     acyclovir (ZOVIRAX) 400 MG tablet, , Disp: , Rfl:     Azelaic Acid 15 % Gel, azelaic acid 15 % topical gel, Disp: , Rfl:     methocarbamol (ROBAXIN) 500 MG Tab, Take 500 mg by mouth 2 times a day as needed., Disp: , Rfl:     nystatin (MYCOSTATIN) 868878 UNIT/ML Suspension, nystatin 100,000 unit/mL oral suspension  SHAKE LIQUID AND TAKE 5 ML BY MOUTH FOUR TIMES DAILY FOR 4 DAYS, Disp: , Rfl:     triamcinolone acetonide (KENALOG) 0.1  % Cream, , Disp: , Rfl:     spironolactone (ALDACTONE) 25 MG Tab, Take 0.5 Tablets by mouth every day., Disp: 45 Tablet, Rfl: 3    metoprolol tartrate (LOPRESSOR) 50 MG Tab, TAKE ONE TABLET BY MOUTH TWICE A DAY **HOLD IF BLOOD PRESSURE LESS THAN 95/50**, Disp: 180 Tablet, Rfl: 3    granisetron (KYTRIL) 1 MG Tab, Take 1 Tablet by mouth every 12 hours as needed for Nausea/Vomiting., Disp: 10 Tablet, Rfl: 0    hydroxychloroquine (PLAQUENIL) 200 MG Tab, Take 1.5 Tablets by mouth every day., Disp: 135 Tablet, Rfl: 3    traZODone (DESYREL) 50 MG Tab, , Disp: , Rfl:     naratriptan (AMERGE) 2.5 MG tablet, Take 1 Tablet by mouth as needed for Migraine., Disp: 5 Tablet, Rfl: 3    hydrALAZINE (APRESOLINE) 10 MG Tab, Take 1 Tablet by mouth 3 times a day as needed (systolic blood pressure >150.)., Disp: 270 Tablet, Rfl: 3    furosemide (LASIX) 20 MG Tab, Take 1 Tablet by mouth 1 time a day as needed (leg swelling/weight gain)., Disp: 90 Tablet, Rfl: 3    potassium chloride SA (KDUR) 20 MEQ Tab CR, Take 1 Tablet by mouth 1 time a day as needed (when you take lasix.)., Disp: 90 Tablet, Rfl: 3    ondansetron (ZOFRAN ODT) 4 MG TABLET DISPERSIBLE, Take 1 Tablet by mouth every 6 hours as needed for Nausea., Disp: 20 Tablet, Rfl: 0    Probiotic Product (PROBIOTIC PO), Take 2 Capsules by mouth every day., Disp: , Rfl:     PROBLEMS:  Patient Active Problem List    Diagnosis Date Noted    Drug-induced lupus erythematosus 09/27/2022    Long-term use of hydroxychloroquine 09/27/2022    Fibromyalgia syndrome 09/27/2022    Positive cardiolipin antibodies (IgA and IgG anti-CL) 07/27/2022    Positive VAHID (1:640 homogenous pattern with negative reflex) 07/27/2022    Inflammatory arthritis 07/27/2022    Mild tetrahydrocannabinol (THC) abuse 06/02/2022    Oropharyngeal dysphagia 06/02/2022    Acute pancreatitis 05/23/2022    Steroid-induced psychosis with complication (HCC) 05/21/2022    Acute encephalopathy 05/21/2022    Acute cystitis without  hematuria 12/07/2021    Ureteric stone 12/07/2021    Migraine 06/04/2019    FLORES (acute kidney injury) ([facility]) 06/01/2019    Essential hypertension 05/20/2019    SIRS (systemic inflammatory response syndrome) ([facility]) 05/19/2019    High anion gap metabolic acidosis 05/19/2019    DVT (deep venous thrombosis) ([facility]) 12/31/2018    History of MRSA infection 12/29/2018    History of infection with vancomycin resistant Enterococcus (VRE) 12/29/2018    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[facility]) 12/28/2018    Dysphasia 12/28/2018    Anemia 12/15/2018    Thrush of mouth and esophagus ([facility]) 12/13/2018    Hypokalemia 12/13/2018    Liver enzyme elevation 12/12/2018    Leukocytosis 12/12/2018    Chronic respiratory failure with hypoxia ([facility]) 03/20/2018    Anxiety disorder due to multiple medical problems 12/01/2017    DDD (degenerative disc disease), lumbar 04/12/2017    History of DVT (deep vein thrombosis) 11/23/2016    Paroxysmal atrial fibrillation ([facility]) 03/26/2015    Sleep apnea 10/26/2014    Postherpetic neuralgia 06/24/2014    Multiple falls 06/24/2014    COPD (chronic obstructive pulmonary disease) ([facility]) 06/24/2014    Rosacea 06/24/2014    Ileostomy in place ([facility]) 06/26/2013    GERD (gastroesophageal reflux disease) 12/05/2012    Status post lumbar surgery 07/16/2012    Crohn's disease of small intestine with complication ([facility]) 09/23/2009    Central sensitization to pain 09/23/2009    Opioid type dependence, continuous (CMS-HCC) 09/23/2009    Degenerative joint disease of cervical and lumbar spine 09/23/2009       REVIEW OF SYSTEMS:  Gen.:  No Nausea, Vomiting, fever, Chills.  Heart: No chest pain.  Lungs:  No shortness of Breath.  Psychological: Rg unusual Anxiety depression     PHYSICAL EXAM   Constitutional: Alert, cooperative, not in acute distress.  Cardiovascular:  Rate Rhythm is regular without murmurs rubs clicks.     Thorax & Lungs: Clear to auscultation, no wheezing, rhonchi, or rales  HENT: Normocephalic, Atraumatic.  Eyes: PERRLA,  EOMI, Conjunctiva normal.   Neck: Trachia is midline no swelling of the thyroid.   Lymphatic: No lymphadenopathy noted.   Neurologic: Alert & oriented x 3, cranial nerves II through XII are intact, Normal motor function, Normal sensory function, No focal deficits noted.   Psychiatric: Affect normal, Judgment normal, Mood normal.     VITAL SIGNS:/64 (BP Location: Left arm, Patient Position: Sitting, BP Cuff Size: Adult)   Pulse 88   Temp 36.2 °C (97.1 °F) (Temporal)   Ht 1.829 m (6')   Wt 71.5 kg (157 lb 9.6 oz)   SpO2 93%   BMI 21.37 kg/m²     Labs: Reviewed    Assessment:                                                     Plan:    1. Esophageal stricture  Severe esophageal stricturing, patient sent to the emergency room      2. Nausea and vomiting, unspecified vomiting type  Nausea vomiting, patient is at risk for acute dehydration needs to be evaluated in emergency room setting possible need for EGD if practical.  Patient sent to the ER    Telephone call to [facility] informed them that patient was being sent to the emergency room.

## 2022-10-22 ENCOUNTER — ANESTHESIA EVENT (OUTPATIENT)
Dept: SURGERY | Facility: MEDICAL CENTER | Age: 67
DRG: 392 | End: 2022-10-22
Payer: MEDICARE

## 2022-10-22 PROBLEM — K22.2 ESOPHAGEAL STRICTURE: Status: ACTIVE | Noted: 2022-10-22

## 2022-10-22 LAB
ALBUMIN SERPL BCP-MCNC: 3.7 G/DL (ref 3.2–4.9)
BASOPHILS # BLD AUTO: 0.9 % (ref 0–1.8)
BASOPHILS # BLD: 0.06 K/UL (ref 0–0.12)
BUN SERPL-MCNC: 15 MG/DL (ref 8–22)
CALCIUM SERPL-MCNC: 8.7 MG/DL (ref 8.5–10.5)
CHLORIDE SERPL-SCNC: 110 MMOL/L (ref 96–112)
CO2 SERPL-SCNC: 19 MMOL/L (ref 20–33)
CREAT SERPL-MCNC: 0.86 MG/DL (ref 0.5–1.4)
EOSINOPHIL # BLD AUTO: 0.2 K/UL (ref 0–0.51)
EOSINOPHIL NFR BLD: 3.2 % (ref 0–6.9)
ERYTHROCYTE [DISTWIDTH] IN BLOOD BY AUTOMATED COUNT: 42.4 FL (ref 35.9–50)
GFR SERPLBLD CREATININE-BSD FMLA CKD-EPI: 73 ML/MIN/1.73 M 2
GLUCOSE SERPL-MCNC: 83 MG/DL (ref 65–99)
HCT VFR BLD AUTO: 39.6 % (ref 37–47)
HGB BLD-MCNC: 12.7 G/DL (ref 12–16)
IMM GRANULOCYTES # BLD AUTO: 0.03 K/UL (ref 0–0.11)
IMM GRANULOCYTES NFR BLD AUTO: 0.5 % (ref 0–0.9)
LYMPHOCYTES # BLD AUTO: 1.86 K/UL (ref 1–4.8)
LYMPHOCYTES NFR BLD: 29.3 % (ref 22–41)
MAGNESIUM SERPL-MCNC: 1.8 MG/DL (ref 1.5–2.5)
MCH RBC QN AUTO: 29.3 PG (ref 27–33)
MCHC RBC AUTO-ENTMCNC: 32.1 G/DL (ref 33.6–35)
MCV RBC AUTO: 91.5 FL (ref 81.4–97.8)
MONOCYTES # BLD AUTO: 0.58 K/UL (ref 0–0.85)
MONOCYTES NFR BLD AUTO: 9.1 % (ref 0–13.4)
NEUTROPHILS # BLD AUTO: 3.61 K/UL (ref 2–7.15)
NEUTROPHILS NFR BLD: 57 % (ref 44–72)
NRBC # BLD AUTO: 0 K/UL
NRBC BLD-RTO: 0 /100 WBC
PHOSPHATE SERPL-MCNC: 1.9 MG/DL (ref 2.5–4.5)
PLATELET # BLD AUTO: 173 K/UL (ref 164–446)
PMV BLD AUTO: 10.4 FL (ref 9–12.9)
POTASSIUM SERPL-SCNC: 3.6 MMOL/L (ref 3.6–5.5)
RBC # BLD AUTO: 4.33 M/UL (ref 4.2–5.4)
SODIUM SERPL-SCNC: 140 MMOL/L (ref 135–145)
WBC # BLD AUTO: 6.3 K/UL (ref 4.8–10.8)

## 2022-10-22 PROCEDURE — 85025 COMPLETE CBC W/AUTO DIFF WBC: CPT

## 2022-10-22 PROCEDURE — 96375 TX/PRO/DX INJ NEW DRUG ADDON: CPT

## 2022-10-22 PROCEDURE — 700111 HCHG RX REV CODE 636 W/ 250 OVERRIDE (IP): Performed by: GENERAL PRACTICE

## 2022-10-22 PROCEDURE — 99221 1ST HOSP IP/OBS SF/LOW 40: CPT | Performed by: NURSE PRACTITIONER

## 2022-10-22 PROCEDURE — 80069 RENAL FUNCTION PANEL: CPT

## 2022-10-22 PROCEDURE — 99233 SBSQ HOSP IP/OBS HIGH 50: CPT | Performed by: GENERAL PRACTICE

## 2022-10-22 PROCEDURE — 770006 HCHG ROOM/CARE - MED/SURG/GYN SEMI*

## 2022-10-22 PROCEDURE — 700111 HCHG RX REV CODE 636 W/ 250 OVERRIDE (IP): Performed by: STUDENT IN AN ORGANIZED HEALTH CARE EDUCATION/TRAINING PROGRAM

## 2022-10-22 PROCEDURE — 700111 HCHG RX REV CODE 636 W/ 250 OVERRIDE (IP)

## 2022-10-22 PROCEDURE — 96376 TX/PRO/DX INJ SAME DRUG ADON: CPT

## 2022-10-22 PROCEDURE — 36415 COLL VENOUS BLD VENIPUNCTURE: CPT

## 2022-10-22 PROCEDURE — 83735 ASSAY OF MAGNESIUM: CPT

## 2022-10-22 PROCEDURE — 700105 HCHG RX REV CODE 258: Performed by: GENERAL PRACTICE

## 2022-10-22 RX ORDER — KETOROLAC TROMETHAMINE 30 MG/ML
15 INJECTION, SOLUTION INTRAMUSCULAR; INTRAVENOUS EVERY 6 HOURS PRN
Status: DISCONTINUED | OUTPATIENT
Start: 2022-10-22 | End: 2022-10-24 | Stop reason: HOSPADM

## 2022-10-22 RX ORDER — DIAZEPAM 5 MG/ML
5 INJECTION, SOLUTION INTRAMUSCULAR; INTRAVENOUS EVERY 6 HOURS
Status: DISCONTINUED | OUTPATIENT
Start: 2022-10-22 | End: 2022-10-24 | Stop reason: HOSPADM

## 2022-10-22 RX ORDER — AMOXICILLIN 250 MG
2 CAPSULE ORAL 2 TIMES DAILY PRN
Status: DISCONTINUED | OUTPATIENT
Start: 2022-10-22 | End: 2022-10-24 | Stop reason: HOSPADM

## 2022-10-22 RX ORDER — HYDROMORPHONE HYDROCHLORIDE 1 MG/ML
1 INJECTION, SOLUTION INTRAMUSCULAR; INTRAVENOUS; SUBCUTANEOUS EVERY 4 HOURS PRN
Status: DISCONTINUED | OUTPATIENT
Start: 2022-10-22 | End: 2022-10-24 | Stop reason: HOSPADM

## 2022-10-22 RX ORDER — OXYCODONE HYDROCHLORIDE 10 MG/1
10 TABLET ORAL
Status: DISCONTINUED | OUTPATIENT
Start: 2022-10-22 | End: 2022-10-24 | Stop reason: HOSPADM

## 2022-10-22 RX ORDER — METOCLOPRAMIDE HYDROCHLORIDE 5 MG/ML
10 INJECTION INTRAMUSCULAR; INTRAVENOUS EVERY 6 HOURS
Status: DISCONTINUED | OUTPATIENT
Start: 2022-10-22 | End: 2022-10-24 | Stop reason: HOSPADM

## 2022-10-22 RX ORDER — HYDROMORPHONE HYDROCHLORIDE 1 MG/ML
0.5 INJECTION, SOLUTION INTRAMUSCULAR; INTRAVENOUS; SUBCUTANEOUS EVERY 4 HOURS PRN
Status: DISCONTINUED | OUTPATIENT
Start: 2022-10-22 | End: 2022-10-22

## 2022-10-22 RX ORDER — HYDROMORPHONE HYDROCHLORIDE 1 MG/ML
0.5 INJECTION, SOLUTION INTRAMUSCULAR; INTRAVENOUS; SUBCUTANEOUS
Status: DISCONTINUED | OUTPATIENT
Start: 2022-10-22 | End: 2022-10-22

## 2022-10-22 RX ORDER — POLYETHYLENE GLYCOL 3350 17 G/17G
1 POWDER, FOR SOLUTION ORAL
Status: DISCONTINUED | OUTPATIENT
Start: 2022-10-22 | End: 2022-10-24 | Stop reason: HOSPADM

## 2022-10-22 RX ORDER — SODIUM CHLORIDE 9 MG/ML
INJECTION, SOLUTION INTRAVENOUS CONTINUOUS
Status: DISCONTINUED | OUTPATIENT
Start: 2022-10-22 | End: 2022-10-24

## 2022-10-22 RX ORDER — SODIUM CHLORIDE 9 MG/ML
INJECTION, SOLUTION INTRAVENOUS CONTINUOUS
Status: DISCONTINUED | OUTPATIENT
Start: 2022-10-22 | End: 2022-10-22

## 2022-10-22 RX ORDER — OXYCODONE HYDROCHLORIDE 5 MG/1
5 TABLET ORAL
Status: DISCONTINUED | OUTPATIENT
Start: 2022-10-22 | End: 2022-10-24 | Stop reason: HOSPADM

## 2022-10-22 RX ORDER — BISACODYL 10 MG
10 SUPPOSITORY, RECTAL RECTAL
Status: DISCONTINUED | OUTPATIENT
Start: 2022-10-22 | End: 2022-10-24 | Stop reason: HOSPADM

## 2022-10-22 RX ORDER — HYDROMORPHONE HYDROCHLORIDE 1 MG/ML
0.5 INJECTION, SOLUTION INTRAMUSCULAR; INTRAVENOUS; SUBCUTANEOUS ONCE
Status: COMPLETED | OUTPATIENT
Start: 2022-10-22 | End: 2022-10-22

## 2022-10-22 RX ADMIN — METOCLOPRAMIDE 10 MG: 5 INJECTION, SOLUTION INTRAMUSCULAR; INTRAVENOUS at 23:17

## 2022-10-22 RX ADMIN — DIAZEPAM 5 MG: 5 INJECTION, SOLUTION INTRAMUSCULAR; INTRAVENOUS at 17:54

## 2022-10-22 RX ADMIN — METOCLOPRAMIDE 10 MG: 5 INJECTION, SOLUTION INTRAMUSCULAR; INTRAVENOUS at 17:54

## 2022-10-22 RX ADMIN — METOCLOPRAMIDE 10 MG: 5 INJECTION, SOLUTION INTRAMUSCULAR; INTRAVENOUS at 12:23

## 2022-10-22 RX ADMIN — ONDANSETRON 4 MG: 2 INJECTION, SOLUTION INTRAMUSCULAR; INTRAVENOUS at 11:24

## 2022-10-22 RX ADMIN — ONDANSETRON 4 MG: 2 INJECTION, SOLUTION INTRAMUSCULAR; INTRAVENOUS at 03:00

## 2022-10-22 RX ADMIN — HYDROMORPHONE HYDROCHLORIDE 1 MG: 1 INJECTION, SOLUTION INTRAMUSCULAR; INTRAVENOUS; SUBCUTANEOUS at 11:16

## 2022-10-22 RX ADMIN — SODIUM CHLORIDE: 9 INJECTION, SOLUTION INTRAVENOUS at 20:59

## 2022-10-22 RX ADMIN — HYDROMORPHONE HYDROCHLORIDE 0.5 MG: 1 INJECTION, SOLUTION INTRAMUSCULAR; INTRAVENOUS; SUBCUTANEOUS at 06:15

## 2022-10-22 RX ADMIN — DIAZEPAM 5 MG: 5 INJECTION, SOLUTION INTRAMUSCULAR; INTRAVENOUS at 23:18

## 2022-10-22 RX ADMIN — ONDANSETRON 4 MG: 2 INJECTION, SOLUTION INTRAMUSCULAR; INTRAVENOUS at 08:54

## 2022-10-22 RX ADMIN — HYDROMORPHONE HYDROCHLORIDE 1 MG: 1 INJECTION, SOLUTION INTRAMUSCULAR; INTRAVENOUS; SUBCUTANEOUS at 19:45

## 2022-10-22 RX ADMIN — HYDROMORPHONE HYDROCHLORIDE 1 MG: 1 INJECTION, SOLUTION INTRAMUSCULAR; INTRAVENOUS; SUBCUTANEOUS at 15:31

## 2022-10-22 RX ADMIN — HYDROMORPHONE HYDROCHLORIDE 0.5 MG: 1 INJECTION, SOLUTION INTRAMUSCULAR; INTRAVENOUS; SUBCUTANEOUS at 02:45

## 2022-10-22 RX ADMIN — DIAZEPAM 5 MG: 5 INJECTION, SOLUTION INTRAMUSCULAR; INTRAVENOUS at 12:24

## 2022-10-22 ASSESSMENT — ENCOUNTER SYMPTOMS
PHOTOPHOBIA: 0
HEMOPTYSIS: 0
VOMITING: 0
FEVER: 0
EYE PAIN: 0
NECK PAIN: 0
DIARRHEA: 0
ORTHOPNEA: 0
DEPRESSION: 0
PND: 0
SHORTNESS OF BREATH: 0
DOUBLE VISION: 0
HEADACHES: 0
ABDOMINAL PAIN: 0
MYALGIAS: 1
SPEECH CHANGE: 0
FALLS: 0
SORE THROAT: 1
BLURRED VISION: 0
INSOMNIA: 0
BLOOD IN STOOL: 0
HEARTBURN: 1
BACK PAIN: 1
WHEEZING: 0
ABDOMINAL PAIN: 1
WEIGHT LOSS: 0
TREMORS: 0
CHILLS: 0
VOMITING: 1
NERVOUS/ANXIOUS: 1
LOSS OF CONSCIOUSNESS: 0
PALPITATIONS: 0
POLYDIPSIA: 0
SENSORY CHANGE: 1
ROS GI COMMENTS: SEVERE DYSPHAGIA
BRUISES/BLEEDS EASILY: 0
COUGH: 0
FOCAL WEAKNESS: 0
SINUS PAIN: 0
MYALGIAS: 0
DIZZINESS: 0
NAUSEA: 1
FLANK PAIN: 0
SEIZURES: 0
TINGLING: 0
MEMORY LOSS: 0
EYE REDNESS: 0
CONSTIPATION: 0

## 2022-10-22 ASSESSMENT — PAIN DESCRIPTION - PAIN TYPE
TYPE: CHRONIC PAIN
TYPE: ACUTE PAIN
TYPE: CHRONIC PAIN

## 2022-10-22 ASSESSMENT — FIBROSIS 4 INDEX: FIB4 SCORE: 2.35

## 2022-10-22 ASSESSMENT — LIFESTYLE VARIABLES: SUBSTANCE_ABUSE: 0

## 2022-10-22 NOTE — ED TRIAGE NOTES
Chief Complaint   Patient presents with    Sent by MD     Sent by MD Kramer for severe esophageal stricture and inability to tolerate oral hydration and nutrition for the past few days. Pt endorses history crohn's, lupus, neck and back fusions, and renal dysfunction.       Ambulatory to triage for above complaint.   Educated on triage process, encourage to inform staff of any changes.     BP (!) 165/74   Pulse 85   Temp 36.7 °C (98 °F) (Temporal)   Resp 16   Ht 1.829 m (6')   Wt 71.2 kg (156 lb 15.5 oz)   SpO2 96%   BMI 21.29 kg/m²

## 2022-10-22 NOTE — ASSESSMENT & PLAN NOTE
Patient reports she has not been able to eat or drink for the past several days.  ERP consulted GI, plan for EGD and dilatation on 10/24.

## 2022-10-22 NOTE — ED NOTES
Med rec completed per patient at bedside and patient's pharmacies, [facility] on [address] ([phone]) and [facility] on [address] [facility] ([phone]). Patient was unsure of dosing for some of her medications. Dosing on med rec updated to reflect sigs per dispensing pharmacy.  Allergies reviewed with patient.  No outpatient antibiotics in the last 30 days.

## 2022-10-22 NOTE — ED PROVIDER NOTES
"ED Provider Note    Scribed for Isabelle Stauffer M.D. by Royer Crump. 10/21/2022  6:13 PM    Means of arrival: Walk-in  History obtained from: Patient  History limited by: None      CHIEF COMPLAINT  Chief Complaint   Patient presents with    Sent by MD     Sent by MD Kramer for severe esophageal stricture and inability to tolerate oral hydration and nutrition for the past few days. Pt endorses history crohn's, lupus, neck and back fusions, and renal dysfunction.       HPI  Jana Overton is a 67 y.o. female with a history of Crohn's disease, lupus, fibromyalgia presents to the Emergency Department for acute, mild vomiting secondary to esophageal stricture onset 2 days ago. The patient was sent by Dr. Kramer for esophageal stricture and inability to tolerate food or liquids without episodes of vomiting. She says that her vomit comes out of her nose. She states she is unable to lay down due to the food and liquid \"coming back up. She states she has been trying to see a GI specialist but \"no one will see her.\" Her last GI appointment was last fall with [facility] but the patient states they were more concerned for her kidneys and renal failure. The patient reports taking Zofran at 3 PM to no alleviation. Patient reports associated symptoms of dizziness, but denies fever. Patient denies any alleviating factors. The patient is medicated on oxycodone, diazepam, and Kytril. She has a history of lupus, arthritis, and Chron's disease. She is not on any immunosuppression or steroids currently.    REVIEW OF SYSTEMS  Pertinent positive include vomiting, reflux, and dizziness. Pertinent negative include no fever. All other systems reviewed and are negative.    PAST MEDICAL HISTORY   has a past medical history of Anesthesia, Anxiety, Arthritis, ASTHMA, Atrial fib/flut, Backpain, Bronchitis, Chronic pain, Colostomy in place ([facility]) (10/14/2010), COPD (chronic obstructive pulmonary disease) ([facility]) (6/24/2014), COPD (chronic " obstructive pulmonary disease) ([facility]) (6/24/2014), Crohn's disease of colon ([facility]), Dyspnea, History of cardiac murmur, Hypokalemia (6/24/2014), Ileostomy present ([facility]), Infectious disease, Multiple falls (6/24/2014), Narcotic dependence ([facility]) (9/23/2009), Obstruction, Other specified disorder of intestines, Pain (7/11/13), Pneumonia, Postherpetic neuralgia (6/24/2014), Rosacea (6/24/2014), and Sleep apnea.    SOCIAL HISTORY  Social History     Tobacco Use    Smoking status: Never    Smokeless tobacco: Never    Tobacco comments:     second hand smoke parents - smoked for only 1 year many years ago   Vaping Use    Vaping Use: Never used   Substance and Sexual Activity    Alcohol use: Not Currently     Alcohol/week: 0.6 oz     Types: 1 Shots of liquor per week     Comment: gin and half a lime; tonic water    Drug use: Yes     Types: Marijuana, Inhaled     Comment: medical marijuana through bong/vape    Sexual activity: Not pertinent     Comment:        SURGICAL HISTORY   has a past surgical history that includes lumbar fusion anterior; cervical disk and fusion anterior; foot surgery; hand surgery; other abdominal surgery; gastroscopy with biopsy (11/22/08); ileostomy (11/11/2009); dilation and curettage (9/24/2010); gastroscopy (10/4/2011); colonoscopy (10/4/2011); hardware removal neuro (7/16/2012); mammoplasty reduction (7/17/2013); ileostomy (5/14/2014); breast reduction; abdominal exploration; lumpectomy; colon resection; ileostomy (12/29/2018); sigmoidoscopy flex (12/29/2018); exploratory laparotomy (12/29/2018); gastroscopy (1/6/2019); gastroscopy (N/A, 5/22/2019); ileostomy (1/20/2021); lysis adhesions general (1/20/2021); cysto/uretero/pyeloscopy, dx (Right, 12/8/2021); cysto stent placemnt pre surg (Right, 12/8/2021); cystoscopy,insert ureteral stent (Right, 12/23/2021); and cysto/uretero/pyeloscopy, dx (Right, 12/23/2021).    CURRENT MEDICATIONS  Home Medications       Reviewed by Keith Calle  "([facility]) on 10/21/22 at 2021  Med List Status: Complete     Medication Last Dose Status   Ascorbic Acid (VITAMIN C PO) 10/19/2022 Active   Azelaic Acid 15 % Gel 10/21/2022 Active   Biotin w/ Vitamins C & E (HAIR SKIN & NAILS GUMMIES PO) 10/19/2022 Active   diazePAM (VALIUM) 5 MG Tab 10/20/2022 Active   furosemide (LASIX) 20 MG Tab ~ 1 WEEK AGO Active   granisetron (KYTRIL) 1 MG Tab 10/20/2022 Active   hydroxychloroquine (PLAQUENIL) 200 MG Tab 10/21/2022 Active   melatonin 5 mg Tab ~ 1 WEEK AGO Active   metoprolol tartrate (LOPRESSOR) 50 MG Tab 10/21/2022 Active   Multiple Vitamins-Minerals (CENTRUM SILVER 50+WOMEN) Tab 10/19/2022 Active   naratriptan (AMERGE) 2.5 MG tablet ~ 1 WEEK AGO Active   ondansetron (ZOFRAN ODT) 4 MG TABLET DISPERSIBLE 10/21/2022 Active   oxyCODONE immediate release (ROXICODONE) 10 MG immediate release tablet 10/21/2022 Active   potassium chloride SA (KDUR) 20 MEQ Tab CR ~ 1 WEEK AGO Active   Probiotic Product (PROBIOTIC PO) 10/19/2022 Active   prochlorperazine (COMPAZINE) 10 MG Tab ~ 1 WEEK AGO Active   spironolactone (ALDACTONE) 25 MG Tab 10/21/2022 Active   traZODone (DESYREL) 50 MG Tab 10/20/2022 Active   ZINC PICOLINATE PO 10/19/2022 Active                    ALLERGIES  Allergies   Allergen Reactions    Demerol Hcl [Meperidine] Shortness of Breath and Palpitations    Methotrexate Hives, Shortness of Breath and Rash          Morphine Shortness of Breath and Palpitations    Promethazine Shortness of Breath and Rash          Remicade [Infliximab] Hives, Shortness of Breath and Rash          Sudafed [Pseudoephedrine] Shortness of Breath, Vomiting and Palpitations    Azathioprine Sodium Hives and Shortness of Breath     10/21/2022 - patient unable to verify allergy/reaction  Historical reaction listed: Hives, Shortness of Breath    Carafate [Sucralfate] Vomiting and Nausea     + Mouth Sores    Other Drug Unspecified     \"STEROIDS\" - REACTION NOT SPECIFIED    Sulfa Drugs Rash          " "Sulfasalazine Rash          Amitriptyline Unspecified     Nightmares      Ativan [Lorazepam] Unspecified     Nightmares    Betamethasone Unspecified     10/21/2022 - patient unable to verify allergy/reaction  No historical reaction listed    Fentanyl Unspecified     \"Patches didn't work\" and skin broke down    Lyrica [Pregabalin] Nausea          Methadone Unspecified     \"Didn't work\"    Potassium Unspecified     Notes: In IV only  REACTION NOT SPECIFIED    Tizanidine Unspecified     10/21/2022 - patient unable to verify allergy/reaction  No historical reaction listed    Toradol [Ketorolac Tromethamine] Unspecified     10/21/2022 - patient unable to verify allergy/reaction, states she was told by her doctor not to take       PHYSICAL EXAM   VITAL SIGNS: BP (!) 165/74   Pulse 85   Temp 36.7 °C (98 °F) (Temporal)   Resp 16   Ht 1.829 m (6')   Wt 71.2 kg (156 lb 15.5 oz)   SpO2 96%   BMI 21.29 kg/m²      Constitutional: Nontoxic appearing, alert in no apparent distress.  HENT: Normocephalic, Atraumatic. Bilateral external ears normal. Nose normal.  Dry mucous membranes.  Oropharynx clear.  Eyes: Pupils are equal and reactive. Conjunctiva normal.   Neck: Supple, full range of motion  Heart: Regular rate and rhythm.  No murmurs.    Lungs: No respiratory distress, normal work of breathing. Lungs clear to auscultation bilaterally.  Abdomen Soft, no distention.  No tenderness to palpation.  Ostomy in place in the left lower quadrant.  Musculoskeletal: Atraumatic. No obvious deformities noted.  No lower extremity edema.  Skin: Warm, Dry.  No erythema, No rash.   Neurologic: Alert and oriented x3. Moving all extremities spontaneously without focal deficits.  Psychiatric: Affect normal, Mood normal, Appears appropriate and not intoxicated.    DIAGNOSTIC STUDIES    LABS  Personally reviewed by me  Labs Reviewed   CBC WITH DIFFERENTIAL - Abnormal; Notable for the following components:       Result Value    MCHC 33.2 (*)  "    Neutrophils-Polys 74.30 (*)     Lymphocytes 15.60 (*)     All other components within normal limits   COMP METABOLIC PANEL - Abnormal; Notable for the following components:    Alkaline Phosphatase 116 (*)     All other components within normal limits   ESTIMATED GFR - Abnormal; Notable for the following components:    GFR (CKD-EPI) 58 (*)     All other components within normal limits   LIPASE       ED COURSE  Vitals:    10/21/22 1744 10/21/22 1830 10/21/22 1901 10/21/22 2001   BP:  137/62 131/71 131/63   Pulse:  75 70 77   Resp:  16 15    Temp:       TempSrc:       SpO2:  93% 94% 95%   Weight: 71.2 kg (156 lb 15.5 oz)      Height:             Medications administered:  IVF, dilaudid, zofran    Old records personally reviewed:  The patient has a history of esophageal stricture and had a small bowel follow through on 9/27 showing two severe strictures and partial obstruction. She has a history of Crohn's disease and fibromyalgia. She was last seen by GI in 2019. Last had EGD with dilation with Dr. Cardona on 5/22/2019 that showed hiatal hernia and a mild Schatzki ring.    6:13 PM - Patient seen and examined at bedside. The patient presents with vomiting secondary to esophageal stricture onset 2 days ago. Informed patient about plan of care, including labs. Patient agrees to this plan of care. Ordered for CBC w/ Diff, CMP, and Lipase to evaluate. Patient will be treated with IV fluids 1000 mL for her symptoms. Patient will be treated with Zofran 4 mg injection and Dilaudid 0.5 mg injection for her symptoms.    MEDICAL DECISION MAKING  Patient with history of Crohn's disease and lupus, not currently on treatment, who presents from her primary care office concern for esophageal stricture and inability to tolerate p.o.  She is afebrile with normal vital signs on arrival.  She is tolerating secretions without concern for airway compromise.  She has chronic abdominal pain that is consistent with her Crohn's disease  without concern for flare or other acute issue.  Her labs are not concerning for significant dehydration or electrolyte abnormality.  She did have a recent upper GI/small bowel follow-through that was concerning for 2 esophageal strictures and partial obstruction.    I discussed the case with Dr. Davalos with gastroenterology.  He is recommending admission to the hospital and he will see the patient in the morning.  Likely will perform endoscopy and dilation on Monday.  I discussed this plan of care with the patient who is agreeable.      I also discussed the patient with Dr. Cai, hospitalist on-call accepts admission of the patient.      DISPOSITION:  Patient will be hospitalized by Dr. Cai in guarded condition.    IMPRESSION  (K22.2) Esophageal stricture  (R11.2) Nausea and vomiting, unspecified vomiting type      The patient is referred to a primary physician for blood pressure management, diabetic screening, and for all other preventative health concerns.       Royer DE LA CRUZ (Gia), am scribing for, and in the presence of, Isabelle Stauffer M.D..    Electronically signed by: Royer Crump (Gia), 10/21/2022    Isabelle DE LA CRUZ M.D. personally performed the services described in this documentation, as scribed by Royer Crump in my presence, and it is both accurate and complete.    The note accurately reflects work and decisions made by me.  Isaeblle Stauffer M.D.  10/21/2022  9:36 PM

## 2022-10-22 NOTE — WOUND TEAM
[facility]  Inpatient Services  Wound and Skin Care Brief Evaluation    Admission Date: 10/21/2022     Last order of IP CONSULT TO WOUND CARE was found on 10/21/2022 from Hospital Encounter on 10/21/2022     HPI, PMH, SH: Reviewed    Chief Complaint   Patient presents with    Sent by MD     Sent by MD Kramer for severe esophageal stricture and inability to tolerate oral hydration and nutrition for the past few days. Pt endorses history crohn's, lupus, neck and back fusions, and renal dysfunction.     Diagnosis: Dysphagia [R13.10]    Unit where seen by Wound Team: S524/01     Wound consult placed regarding established ostomy. Chart and images reviewed. This RN in to assess patient. Pt pleasant and agreeable. Patient has had ileostomy for multiple years r/t Crohn's disease and is completely independent with care. Her , Goyo, does assist her with ostomy care as well. Patient has her own bag of supplies with her and does not currently need anything from us.  Wound consult completed. Wound team signing off, re-consult if patient has any further advanced wound care needs.    RSKIN:   CURRENTLY IN PLACE (X), APPLIED THIS VISIT (A), ORDERED (O):   Q shift Tru:  X  Q shift pressure point assessments:  X    Surface/Positioning   Pressure redistribution mattress          X  Low Airloss          Bariatric foam      Bariatric ULISES     Waffle cushion        Waffle Overlay          Reposition q 2 hours      TAPs Turning system     Z Dank Pillow     Offloading/Redistribution   Sacral Mepilex (Silicone dressing)   A, healed pressure injury to area, blanchable erythema present  Heel Mepilex (Silicone dressing)         Heel float boots (Prevalon boot)             Float Heels off Bed with Pillows       X    Respiratory Na - on RA  Silicone O2 tubing         Gray Foam Ear protectors     Cannula fixation Device (Tender )          High flow offloading Clip    Elastic head band offloading device       Anchorfast                                                         Trach with Optifoam split foam             Containment/Moisture Prevention Ostomy    Rectal tube or BMS    Purwick/Condom Cath        Sage Catheter    Barrier wipes           Barrier paste       Antifungal tx      Interdry        Mobilization       Up to chair        Ambulate    X  PT/OT      Nutrition     Unable to tolerate PO r/t esophageal strictures  Dietician        Diabetes Education      PO     TF     TPN     NPO X  # days

## 2022-10-22 NOTE — CARE PLAN
The patient is Stable - Low risk of patient condition declining or worsening    Shift Goals  Clinical Goals: GI consult, tolerate PO intake, pain control  Patient Goals: see doctor, rest    Progress made toward(s) clinical / shift goals:  Pain being managed.    Patient is not progressing towards the following goals: N/A

## 2022-10-22 NOTE — PROGRESS NOTES
Saw the patient at bedside. She has multiple stricture in the esophagus. Will be NPO with iv fluid now and plan to do EGD with dilatation on 10/24.     Full note will follow.

## 2022-10-22 NOTE — CARE PLAN
The patient is Stable - Low risk of patient condition declining or worsening    Shift Goals  Clinical Goals: GI consult  Patient Goals: GI consult    Progress made toward(s) clinical / shift goals:  GI at bedside this AM will have surgery Monday.    Patient is not progressing towards the following goals:

## 2022-10-22 NOTE — DIETARY
Nutrition services: Day 0 of admit.  Jana Overton is a 67 y.o. female admitted with esophageal stricture and inability to tolerate food or liquids   History includes Crohn's disease, lupus, fibromyalgia, COPD, ileostomy, colostomy    Patient with weight loss and poor PO intake noted on admit screen. Not likely taking adequate liquids  Patient stated she has been managing this condition for a long time and knows what she can tolerate.      Assessment:  Height: 182.9 cm (6')  Weight: 74 kg (163 lb 2.3 oz)  Body mass index is 22.13 kg/m².  Diet/Intake: Level 3 - liquidized with slightly thick liquids    Evaluation:   Tentative endoscopy and dilation for 10/24  Weight history per Epic has been 155-163# since June of this year  At risk for refeeding syndrome.    Pertinent Labs today: Phos 1.9 today - Phos replacement on MAR  Other pertinent medications/fluids include Lasix, NS at 100 ml/hr    Malnutrition Risk: Does not meet ASPEN criteria at this time    Recommendations/Interventions/Plan:    Encourage intake of liquid diet, yogurt smoothies and milkshakes added per patient preference  Advance diet as appropriate after dilation  Document intake of all PO as % taken in ADL's to provide interdisciplinary communication across all shifts.   Monitor weight.  Nutrition rep will continue to see patient for ongoing meal and snack preferences.     RD following

## 2022-10-22 NOTE — ED NOTES
Called report to riddhi on the unit. Transport taking the patient up to the floor.    Thalidomide Counseling: I discussed with the patient the risks of thalidomide including but not limited to birth defects, anxiety, weakness, chest pain, dizziness, cough and severe allergy.

## 2022-10-22 NOTE — THERAPY
SLP CONTAC NOTE     10/22/22 1017   Treatment Variance   Reason For Missed Therapy Medical - Other (Please Comment)   Interdisciplinary Plan of Care Collaboration   IDT Collaboration with  Nursing   Collaboration Comments Orders received for clinical swallow evaluation. Per RN, pt NPO with iv fluid pending EGD with dilatation on 10/24. RN to cx order and koby re-order when appropriate.

## 2022-10-22 NOTE — HOSPITAL COURSE
This is a 67-year-old female with past medical history of lupus on Plaquenil, Crohn's disease s/p ileostomy, fibromyalgia, history of esophageal stricture requiring dilatation who was admitted on 10/21/2022 with dysphagia.    Patient reports she has not been able to eat or drink for the past several days.  ERP consulted GI, plan for EGD and dilatation on 10/24.

## 2022-10-22 NOTE — PROGRESS NOTES
Patient arrived to unit and admission complete. VSS on RA. Patient is A&Ox4. Patient requesting Valium. Patient unable to take PO medications and requests all meds to be IV if possible. Dr. Cai notified and stated to hold all PO meds and ordered one time dose IV Valium. Patient informed and agreeable to plan. Patient oriented to unit. Bed locked and in lowest position, bed alarm on. Call light and personal belongings within reach.

## 2022-10-22 NOTE — PROGRESS NOTES
Hospital Medicine Daily Progress Note    Date of Service  10/22/2022    Chief Complaint  Jana Overton is a 67 y.o. female admitted 10/21/2022 with dysphagia    Hospital Course  This is a 67-year-old female with past medical history of lupus on Plaquenil, Crohn's disease s/p ileostomy, fibromyalgia, history of esophageal stricture requiring dilatation who was admitted on 10/21/2022 with dysphagia.    Patient reports she has not been able to eat or drink for the past several days.  ERP consulted GI, plan for EGD and dilatation on 10/24.     Interval Problem Update  Patient reports she has not been able to eat or drink for the past several days.  ERP consulted GI, plan for EGD and dilatation on 10/24.      was at bedside, updated on plan of care, all questions answered.    I have discussed this patient's plan of care and discharge plan at IDT rounds today with Case Management, Nursing, Nursing leadership, and other members of the IDT team.    Consultants/Specialty  GI    Code Status  Full Code    Disposition  Patient is not medically cleared for discharge.   Anticipate discharge to to home with close outpatient follow-up.  I have placed the appropriate orders for post-discharge needs.    Review of Systems  Review of Systems   HENT:  Positive for sore throat.    All other systems reviewed and are negative.     Physical Exam  Temp:  [36.2 °C (97.2 °F)-36.7 °C (98 °F)] 36.6 °C (97.8 °F)  Pulse:  [60-85] 79  Resp:  [15-19] 16  BP: (103-165)/(62-74) 103/66  SpO2:  [93 %-96 %] 95 %    Physical Exam  Vitals and nursing note reviewed.   Constitutional:       General: She is not in acute distress.     Appearance: Normal appearance.   HENT:      Head: Normocephalic and atraumatic.      Mouth/Throat:      Mouth: Mucous membranes are moist.      Pharynx: No oropharyngeal exudate.   Eyes:      Extraocular Movements: Extraocular movements intact.      Pupils: Pupils are equal, round, and reactive to light.    Cardiovascular:      Rate and Rhythm: Normal rate and regular rhythm.      Pulses: Normal pulses.      Heart sounds: No murmur heard.    No friction rub. No gallop.   Pulmonary:      Effort: Pulmonary effort is normal. No respiratory distress.      Breath sounds: No wheezing, rhonchi or rales.   Abdominal:      General: Bowel sounds are normal. There is no distension.      Palpations: Abdomen is soft. There is no mass.      Tenderness: There is no abdominal tenderness.   Musculoskeletal:         General: No swelling or tenderness. Normal range of motion.      Cervical back: Normal range of motion. No rigidity. No muscular tenderness.      Right lower leg: No edema.      Left lower leg: No edema.   Skin:     General: Skin is warm and dry.      Capillary Refill: Capillary refill takes less than 2 seconds.      Findings: No erythema or rash.   Neurological:      General: No focal deficit present.      Mental Status: She is alert and oriented to person, place, and time.      Motor: No weakness.      Gait: Gait normal.       Fluids    Intake/Output Summary (Last 24 hours) at 10/22/2022 1519  Last data filed at 10/21/2022 2014  Gross per 24 hour   Intake 1000 ml   Output --   Net 1000 ml       Laboratory  Recent Labs     10/21/22  1850 10/22/22  0236   WBC 8.4 6.3   RBC 4.38 4.33   HEMOGLOBIN 13.1 12.7   HEMATOCRIT 39.5 39.6   MCV 90.2 91.5   MCH 29.9 29.3   MCHC 33.2* 32.1*   RDW 41.5 42.4   PLATELETCT 233 173   MPV 10.4 10.4     Recent Labs     10/21/22  1850 10/22/22  0236   SODIUM 137 140   POTASSIUM 4.0 3.6   CHLORIDE 105 110   CO2 20 19*   GLUCOSE 96 83   BUN 19 15   CREATININE 1.04 0.86   CALCIUM 9.5 8.7                   Imaging  No orders to display        Assessment/Plan  * Esophageal stricture  Assessment & Plan  Patient reports she has not been able to eat or drink for the past several days.  ERP consulted GI, plan for EGD and dilatation on 10/24.     Dysphagia- (present on admission)  Assessment &  Plan  Abnormal outpatient barium swallow  GI consulted    Fibromyalgia syndrome- (present on admission)  Assessment & Plan  Continue home regimen    Positive VAHID (1:640 homogenous pattern with negative reflex)- (present on admission)  Assessment & Plan  History of lupus per patient  Hydroxychloroquine    Oropharyngeal dysphagia- (present on admission)  Assessment & Plan  Plan as above    Ileostomy in place (HCC)- (present on admission)  Assessment & Plan  Ostomy care    GERD (gastroesophageal reflux disease)- (present on admission)  Assessment & Plan  PPI       VTE prophylaxis: enoxaparin ppx    I have performed a physical exam and reviewed and updated ROS and Plan today (10/22/2022). In review of yesterday's note (10/21/2022), there are no changes except as documented above.

## 2022-10-22 NOTE — H&P
Hospital Medicine History & Physical Note    Date of Service  10/21/2022    Primary Care Physician  Chase Charles D.O.    Consultants  GI (Dr. Davalos)    Code Status  Full Code    Chief Complaint  Chief Complaint   Patient presents with    Sent by MD     Sent by MD Kramer for severe esophageal stricture and inability to tolerate oral hydration and nutrition for the past few days. Pt endorses history crohn's, lupus, neck and back fusions, and renal dysfunction.       History of Presenting Illness  Jana Overton is a 67 y.o. female with history of lupus, esophageal stricture, s/p colostomy who presented 10/21/2022 with evaluation for dysphagia.  Patient has history of esophageal stricture with prior dilatation.  He had outpatient abnormal barium swallow study, seen by PCP earlier today, referred to ER for further evaluation.  Patient reported not eating drinking well for the past few days.  GI has been consulted, formal evaluation follow.  Admitted to medicine service.    I discussed the plan of care with patient and bedside RN.    Review of Systems  Review of Systems   Constitutional:  Negative for chills, fever and malaise/fatigue.   HENT:  Negative for hearing loss and tinnitus.    Eyes:  Negative for blurred vision and double vision.   Respiratory:  Negative for cough and shortness of breath.    Cardiovascular:  Negative for chest pain and palpitations.   Gastrointestinal:  Positive for heartburn, nausea and vomiting. Negative for abdominal pain.   Genitourinary:  Negative for dysuria and urgency.   Musculoskeletal:  Negative for joint pain and myalgias.   Skin:  Negative for itching and rash.   Neurological:  Negative for dizziness, speech change, focal weakness and headaches.   Endo/Heme/Allergies:  Negative for environmental allergies. Does not bruise/bleed easily.   Psychiatric/Behavioral:  Negative for depression and substance abuse.    All other systems reviewed and are negative.    Past Medical  History   has a past medical history of Anesthesia, Anxiety, Arthritis, ASTHMA, Atrial fib/flut, Backpain, Bronchitis, Chronic pain, Colostomy in place ([facility]) (10/14/2010), COPD (chronic obstructive pulmonary disease) ([facility]) (6/24/2014), COPD (chronic obstructive pulmonary disease) ([facility]) (6/24/2014), Crohn's disease of colon ([facility]), Dyspnea, History of cardiac murmur, Hypokalemia (6/24/2014), Ileostomy present ([facility]), Infectious disease, Multiple falls (6/24/2014), Narcotic dependence ([facility]) (9/23/2009), Obstruction, Other specified disorder of intestines, Pain (7/11/13), Pneumonia, Postherpetic neuralgia (6/24/2014), Rosacea (6/24/2014), and Sleep apnea.    Surgical History   has a past surgical history that includes lumbar fusion anterior; cervical disk and fusion anterior; foot surgery; hand surgery; other abdominal surgery; gastroscopy with biopsy (11/22/08); ileostomy (11/11/2009); dilation and curettage (9/24/2010); gastroscopy (10/4/2011); colonoscopy (10/4/2011); hardware removal neuro (7/16/2012); mammoplasty reduction (7/17/2013); ileostomy (5/14/2014); pr breast reduction; abdominal exploration; lumpectomy; colon resection; ileostomy (12/29/2018); sigmoidoscopy flex (12/29/2018); exploratory laparotomy (12/29/2018); gastroscopy (1/6/2019); gastroscopy (N/A, 5/22/2019); ileostomy (1/20/2021); lysis adhesions general (1/20/2021); pr cysto/uretero/pyeloscopy, dx (Right, 12/8/2021); cysto stent placemnt pre surg (Right, 12/8/2021); pr cystoscopy,insert ureteral stent (Right, 12/23/2021); and pr cysto/uretero/pyeloscopy, dx (Right, 12/23/2021).     Family History  family history includes Cancer (age of onset: 40) in her maternal aunt; Diabetes in her father and sister; Heart Disease in her father; Lung Disease in her mother.   Family history reviewed with patient. There is no family history that is pertinent to the chief complaint.     Social History   reports that she has never smoked. She has never used smokeless  "tobacco. She reports that she does not currently use alcohol after a past usage of about 0.6 oz per week. She reports current drug use. Drugs: Marijuana and Inhaled.    Allergies  Allergies   Allergen Reactions    Demerol Hcl [Meperidine] Shortness of Breath and Palpitations    Methotrexate Hives, Shortness of Breath and Rash          Morphine Shortness of Breath and Palpitations    Promethazine Shortness of Breath and Rash          Remicade [Infliximab] Hives, Shortness of Breath and Rash          Sudafed [Pseudoephedrine] Shortness of Breath, Vomiting and Palpitations    Azathioprine Sodium Hives and Shortness of Breath     10/21/2022 - patient unable to verify allergy/reaction  Historical reaction listed: Hives, Shortness of Breath    Carafate [Sucralfate] Vomiting and Nausea     + Mouth Sores    Other Drug Unspecified     \"STEROIDS\" - REACTION NOT SPECIFIED    Sulfa Drugs Rash          Sulfasalazine Rash          Amitriptyline Unspecified     Nightmares      Ativan [Lorazepam] Unspecified     Nightmares    Betamethasone Unspecified     10/21/2022 - patient unable to verify allergy/reaction  No historical reaction listed    Fentanyl Unspecified     \"Patches didn't work\" and skin broke down    Lyrica [Pregabalin] Nausea          Methadone Unspecified     \"Didn't work\"    Potassium Unspecified     Notes: In IV only  REACTION NOT SPECIFIED    Tizanidine Unspecified     10/21/2022 - patient unable to verify allergy/reaction  No historical reaction listed    Toradol [Ketorolac Tromethamine] Unspecified     10/21/2022 - patient unable to verify allergy/reaction, states she was told by her doctor not to take       Medications  Prior to Admission Medications   Prescriptions Last Dose Informant Patient Reported? Taking?   Ascorbic Acid (VITAMIN C PO) 10/19/2022 at AM Patient Yes Yes   Sig: Take 1 Tablet by mouth 2 times a day.   Azelaic Acid 15 % Gel 10/21/2022 at AM Patient Yes No   Sig: Apply 1 Application topically " every day. Apply to face.   Biotin w/ Vitamins C & E (HAIR SKIN & NAILS GUMMIES PO) 10/19/2022 at AM Patient Yes Yes   Sig: Take 2 Tablets by mouth every morning.   Multiple Vitamins-Minerals (CENTRUM SILVER 50+WOMEN) Tab 10/19/2022 at AM Patient Yes Yes   Sig: Take 2 Tablets by mouth every morning. GUMMIES.   Probiotic Product (PROBIOTIC PO) 10/19/2022 at AM Patient Yes No   Sig: Take 2 Capsules by mouth every day.   ZINC PICOLINATE PO 10/19/2022 at AM Patient Yes Yes   Sig: Take 1 Tablet by mouth 2 times a day.   diazePAM (VALIUM) 5 MG Tab 10/20/2022 at PRN Patient's Home Pharmacy Yes Yes   Sig: Take 5 mg by mouth every 6 hours as needed (Muscle Spasms).   furosemide (LASIX) 20 MG Tab ~ 1 WEEK AGO at PRN Patient's Home Pharmacy No No   Sig: Take 1 Tablet by mouth 1 time a day as needed (leg swelling/weight gain).   granisetron (KYTRIL) 1 MG Tab 10/20/2022 at PRN Patient's Home Pharmacy No No   Sig: Take 1 Tablet by mouth every 12 hours as needed for Nausea/Vomiting.   hydroxychloroquine (PLAQUENIL) 200 MG Tab 10/21/2022 at 0600 Patient's Home Pharmacy No No   Sig: Take 1.5 Tablets by mouth every day.   melatonin 5 mg Tab ~ 1 WEEK AGO at PRN Patient Yes Yes   Sig: Take 5 mg by mouth at bedtime as needed (Sleep).   metoprolol tartrate (LOPRESSOR) 50 MG Tab 10/21/2022 at 0600 Patient's Home Pharmacy Yes Yes   Sig: Take 50 mg by mouth 2 times a day.   naratriptan (AMERGE) 2.5 MG tablet ~ 1 WEEK AGO at PRN Patient's Home Pharmacy No No   Sig: Take 1 Tablet by mouth as needed for Migraine.   ondansetron (ZOFRAN ODT) 4 MG TABLET DISPERSIBLE 10/21/2022 at 1500 Patient's Home Pharmacy No No   Sig: Take 1 Tablet by mouth every 6 hours as needed for Nausea.   oxyCODONE immediate release (ROXICODONE) 10 MG immediate release tablet 10/21/2022 at 1200 Patient's Home Pharmacy Yes Yes   Sig: Take 10 mg by mouth every 6 hours as needed for Severe Pain.   potassium chloride SA (KDUR) 20 MEQ Tab CR ~ 1 WEEK AGO at PRN Patient's Home  Pharmacy No No   Sig: Take 1 Tablet by mouth 1 time a day as needed (when you take lasix.).   prochlorperazine (COMPAZINE) 10 MG Tab ~ 1 WEEK AGO at PRN Patient's Home Pharmacy No No   Sig: Take 1 Tablet by mouth 1 time a day as needed for Nausea/Vomiting.   spironolactone (ALDACTONE) 25 MG Tab 10/21/2022 at 0600 Patient's Home Pharmacy Yes Yes   Sig: Take 50 mg by mouth every day. 2 tablets = 50 mg.   traZODone (DESYREL) 50 MG Tab 10/20/2022 at PM Patient's Home Pharmacy Yes No   Sig: Take 50 mg by mouth every evening.      Facility-Administered Medications: None       Physical Exam  Temp:  [36.2 °C (97.1 °F)-36.7 °C (98 °F)] 36.7 °C (98 °F)  Pulse:  [70-88] 76  Resp:  [15-18] 18  BP: (110-165)/(62-74) 144/72  SpO2:  [93 %-96 %] 96 %  Blood Pressure : 131/63   Temperature: 36.7 °C (98 °F)   Pulse: 77   Respiration: 15   Pulse Oximetry: 95 %       Physical Exam  Vitals and nursing note reviewed.   Constitutional:       General: She is not in acute distress.  HENT:      Head: Normocephalic and atraumatic.      Nose: Nose normal.      Mouth/Throat:      Mouth: Mucous membranes are dry.      Pharynx: Oropharynx is clear.   Eyes:      General: No scleral icterus.     Extraocular Movements: Extraocular movements intact.   Cardiovascular:      Rate and Rhythm: Normal rate and regular rhythm.      Pulses: Normal pulses.      Heart sounds:     No friction rub.   Pulmonary:      Effort: No respiratory distress.      Breath sounds: No stridor. No wheezing or rales.   Abdominal:      General: There is no distension.      Palpations: Abdomen is soft.      Tenderness: There is no abdominal tenderness. There is no guarding or rebound.   Musculoskeletal:         General: No swelling or tenderness. Normal range of motion.      Cervical back: Neck supple. No tenderness.   Skin:     General: Skin is warm and dry.      Capillary Refill: Capillary refill takes less than 2 seconds.   Neurological:      General: No focal deficit present.       Mental Status: She is alert and oriented to person, place, and time.   Psychiatric:         Mood and Affect: Mood normal.       Laboratory:  Recent Labs     10/21/22  1850   WBC 8.4   RBC 4.38   HEMOGLOBIN 13.1   HEMATOCRIT 39.5   MCV 90.2   MCH 29.9   MCHC 33.2*   RDW 41.5   PLATELETCT 233   MPV 10.4     Recent Labs     10/21/22  1850   SODIUM 137   POTASSIUM 4.0   CHLORIDE 105   CO2 20   GLUCOSE 96   BUN 19   CREATININE 1.04   CALCIUM 9.5     Recent Labs     10/21/22  1850   ALTSGPT 18   ASTSGOT 25   ALKPHOSPHAT 116*   TBILIRUBIN 0.4   LIPASE 38   GLUCOSE 96         No results for input(s): NTPROBNP in the last 72 hours.      No results for input(s): TROPONINT in the last 72 hours.    Imaging:  No orders to display       no X-Ray or EKG requiring interpretation    Assessment/Plan:  Justification for Admission Status  I anticipate this patient is appropriate for observation status at this time.        * Dysphagia- (present on admission)  Assessment & Plan  Abnormal outpatient barium swallow  GI consulted    Esophageal stricture  Assessment & Plan  Prior EGD dilatation  GI consulted    Fibromyalgia syndrome- (present on admission)  Assessment & Plan  Continue home regimen    Positive VAHID (1:640 homogenous pattern with negative reflex)- (present on admission)  Assessment & Plan  History of lupus per patient  Hydroxychloroquine    Oropharyngeal dysphagia- (present on admission)  Assessment & Plan  Plan as above    Ileostomy in place (HCC)- (present on admission)  Assessment & Plan  Ostomy care    GERD (gastroesophageal reflux disease)- (present on admission)  Assessment & Plan  PPI      VTE prophylaxis: SCDs/TEDs

## 2022-10-22 NOTE — CONSULTS
Date of Consultation:  10/22/2022    Patient: Jana Overton  MRN: [id number]    Referring Physician: Dr Stauffer     GI:MARCO Aguila     Reason for Consultation: Dysphagia secondary to severe esophageal strictures    History of Present Illness:   This is a 67 y.o. female with pmh of asthma, anxiety, chronic pain due to arthritis and fibromyalgia, lupus, lower extremity neuropathy due to lupus, atrial fibrillation (not anticoagulated due to Crohn's disease), chronic dysphagia (stays on thickened diet), Crohn's disease, esophageal stricture with hx of dilation, s/p ileostomy secondary to Crohn's who presented 10/21/2022 with evaluation for dysphagia and esophageal strictures. She was sent to the ER by her PCP due to severe esophageal strictures and an inability to eat or drink well for the past few days.    The patient  last had an EGD with dilation for esophageal strictures on 5/22/2019, which showed hiatal hernia and a mild Schatzki ring. She underwent a small bowel follow through on 9/27/22, which revealed two severe strictures and partial obstruction. She was seen by her PCP on 10/21 for discussion of outpatient abnormal barium swallow study and was subsequently referred to ER for further evaluation. The patient was admitted to medical service and GI has been consulted for further evaluation and management of her esophageal strictures.    The patient has a long history of Crohn's disease, and initially had abdominal surgery for colectomy in the late 1990s. She reports undergoing multiple revisions since then with her last revision in January, 2021.  For the past 20 years she has used natural medicines to control her Crohn's disease; however she has recently had issues and not had a GI physician since 2019.  She is considering going on traditional Crohn's medications if necessary. Since 2007, she has had dysphagia and has been required to purée her food since then.     She has been following a  "rheumatologist for her lupus for the last 2 months and has since been on hydroxychloroquine.    On my exam, the patient is very tearful, stating she is in severe pain \"all over\", feels she is currently having a crohn's flare due to abdominal pain. Due to her severe dysphagia she is unable to swallow the pain medications that are scheduled as needed for her. She reports burning pain in her esophagus to the point where she feels it is even more difficult to swallow, and is even unable to swallow her saliva at this point.  Anything she attempts to eat comfort back up and her esophagus is painful due to the intermittent vomiting. She reports diffuse abdominal tenderness and nausea. She has had some output of her ileostomy, no noted blood.          Past Medical History:   Diagnosis Date    Postherpetic neuralgia 6/24/2014    Multiple falls 6/24/2014    COPD (chronic obstructive pulmonary disease) ([facility]) 6/24/2014    COPD (chronic obstructive pulmonary disease) ([facility]) 6/24/2014    Rosacea 6/24/2014    Hypokalemia 6/24/2014    Pain 7/11/13    all over    Colostomy in place ([facility]) 10/14/2010    Narcotic dependence ([facility]) 9/23/2009    Anesthesia     difficult Iv stick    Anxiety     Arthritis     all over    ASTHMA     Atrial fib/flut     Backpain     Bronchitis     5 years    Chronic pain     Crohn's disease of colon ([facility])     Dyspnea     History of cardiac murmur     Ileostomy present ([facility])     Infectious disease     MRSA, VancoRSA    Obstruction     Other specified disorder of intestines     crohns disease    Pneumonia     child and mid 30's    Sleep apnea          Past Surgical History:   Procedure Laterality Date    CO CYSTOSCOPY,INSERT URETERAL STENT Right 12/23/2021    Procedure: CYSTOSCOPY, WITH URETERAL STENT EXCHANGE;  Surgeon: Jonathan Turcios M.D.;  Location: SURGERY [facility];  Service: Urology    CO CYSTO/URETERO/PYELOSCOPY, DX Right 12/23/2021    Procedure: URETEROSCOPY;  Surgeon: Jonathan Turcios M.D.;  " Location: [facility];  Service: Urology    [place] CYSTO/URETERO/PYELOSCOPY, DX Right 12/8/2021    Procedure: URETEROSCOPY;  Surgeon: Luc Hook M.D.;  Location: SURGERY [facility];  Service: Urology    CYSTO STENT PLACEMNT PRE SURG Right 12/8/2021    Procedure: CYSTOSCOPY, WITH URETERAL STENT INSERTION;  Surgeon: Luc Hook M.D.;  Location: SURGERY [facility];  Service: Urology    ILEOSTOMY  1/20/2021    Procedure: ILEOSTOMY- COMPLEX REVISION,;  Surgeon: Kevin Cruz M.D.;  Location: SURGERY [facility];  Service: General    LYSIS ADHESIONS GENERAL  1/20/2021    Procedure: LYSIS, ADHESIONS;  Surgeon: Kevin Cruz M.D.;  Location: [facility];  Service: General    GASTROSCOPY N/A 5/22/2019    Procedure: GASTROSCOPY - WITH DILATION;  Surgeon: Dionicio Cardona M.D.;  Location: [facility];  Service: Gastroenterology    GASTROSCOPY  1/6/2019    Procedure: GASTROSCOPY- WITH DILATION;  Surgeon: Daniel Daniels M.D.;  Location: SURGERY [facility];  Service: Gastroenterology    ILEOSTOMY  12/29/2018    Procedure: ILEOSTOMY- REVISION;  Surgeon: Kevin Cruz M.D.;  Location: [facility];  Service: General    SIGMOIDOSCOPY FLEX  12/29/2018    Procedure: SIGMOIDOSCOPY FLEX;  Surgeon: Kevin Cruz M.D.;  Location: [facility];  Service: General    EXPLORATORY LAPAROTOMY  12/29/2018    Procedure: EXPLORATORY LAPAROTOMY, lysis of adhesions;  Surgeon: Kevin Cruz M.D.;  Location: SURGERY [facility];  Service: General    ILEOSTOMY  5/14/2014    Performed by Kevin Cruz M.D. at [facility] ORS    MAMMOPLASTY REDUCTION  7/17/2013    Performed by Janey Burt M.D. at [facility] ORS    HARDWARE REMOVAL NEURO  7/16/2012    Performed by KEELEY KIM at [facility] ORS    GASTROSCOPY  10/4/2011    Performed by TYSON MARTINEZ at SURGERY SAME DAY [facility] ORS    COLONOSCOPY  10/4/2011    Performed by MICHELLE  TYSON OLIVER at SURGERY SAME DAY [facility] ORS    DILATION AND CURETTAGE  9/24/2010    Performed by GAURAV ALY at SURGERY SAME DAY [facility] ORS    ILEOSTOMY  11/11/2009    Performed by SYDNEE AUGUSTINE at SURGERY [facility] ORS    GASTROSCOPY WITH BIOPSY  11/22/08    Performed by NEGRITA HUYNH at SURGERY [facility] ORS    ABDOMINAL EXPLORATION      CERVICAL DISK AND FUSION ANTERIOR      COLON RESECTION      FOOT SURGERY      HAND SURGERY      LUMBAR FUSION ANTERIOR      LUMPECTOMY      OTHER ABDOMINAL SURGERY      surgery for chrons disease    WI BREAST REDUCTION         Family History   Problem Relation Age of Onset    Lung Disease Mother         copd    Diabetes Father     Heart Disease Father     Diabetes Sister     Cancer Maternal Aunt 40        breast       Social History     Socioeconomic History    Marital status:     Highest education level: Associate degree: academic program   Tobacco Use    Smoking status: Never    Smokeless tobacco: Never    Tobacco comments:     second hand smoke parents - smoked for only 1 year many years ago   Vaping Use    Vaping Use: Never used   Substance and Sexual Activity    Alcohol use: Not Currently     Alcohol/week: 0.6 oz     Types: 1 Shots of liquor per week     Comment: gin and half a lime; tonic water    Drug use: Yes     Types: Marijuana, Inhaled     Comment: medical marijuana through bong/vape     Social Determinants of Health     Financial Resource Strain: Low Risk     Difficulty of Paying Living Expenses: Not very hard   Food Insecurity: No Food Insecurity    Worried About Running Out of Food in the Last Year: Never true    Ran Out of Food in the Last Year: Never true   Transportation Needs: No Transportation Needs    Lack of Transportation (Medical): No    Lack of Transportation (Non-Medical): No   Physical Activity: Sufficiently Active    Days of Exercise per Week: 7 days    Minutes of Exercise per Session: 60 min   Stress: No Stress Concern Present     Feeling of Stress : Not at all   Social Connections: Socially Integrated    Frequency of Communication with Friends and Family: More than three times a week    Frequency of Social Gatherings with Friends and Family: Once a week    Attends Islam Services: More than 4 times per year    Active Member of Clubs or Organizations: Yes    Attends Club or Organization Meetings: More than 4 times per year    Marital Status:    Housing Stability: Low Risk     Unable to Pay for Housing in the Last Year: No    Number of Places Lived in the Last Year: 1    Unstable Housing in the Last Year: No       Review of Systems   Constitutional:  Negative for chills, fever, malaise/fatigue and weight loss.   HENT:  Positive for sore throat. Negative for congestion, ear discharge, ear pain, hearing loss, sinus pain and tinnitus.    Eyes:  Negative for blurred vision, double vision, photophobia, pain and redness.   Respiratory:  Negative for cough, hemoptysis, shortness of breath and wheezing.    Cardiovascular:  Negative for chest pain, palpitations, orthopnea, leg swelling and PND.   Gastrointestinal:  Positive for abdominal pain and nausea. Negative for blood in stool, constipation, diarrhea and vomiting.        Severe dysphagia   Genitourinary:  Negative for dysuria, flank pain, frequency, hematuria and urgency.   Musculoskeletal:  Positive for back pain, joint pain (arthritis) and myalgias. Negative for falls and neck pain.   Skin:  Positive for rash (rosacea). Negative for itching.   Neurological:  Positive for sensory change (chronic lower extremity neuropathy). Negative for dizziness, tingling, tremors, speech change, focal weakness, seizures, loss of consciousness and headaches.   Endo/Heme/Allergies:  Negative for polydipsia.   Psychiatric/Behavioral:  Negative for depression, memory loss, substance abuse and suicidal ideas. The patient is nervous/anxious (self treated). The patient does not have insomnia.        Physical  Exam:  Vitals:    10/21/22 2001 10/21/22 2151 10/22/22 0426 10/22/22 0709   BP: 131/63 (!) 144/72 135/64 103/66   Pulse: 77 76 60 79   Resp:  18 19 16   Temp:  36.7 °C (98 °F) 36.2 °C (97.2 °F) 36.6 °C (97.8 °F)   TempSrc:  Temporal Temporal Temporal   SpO2: 95% 96% 96% 95%   Weight:  71.1 kg (156 lb 12 oz) 74 kg (163 lb 2.3 oz)    Height:  1.829 m (6')         Physical Exam  Vitals and nursing note reviewed.   Constitutional:       General: She is in acute distress (tearful, moderate distress due to pain).      Appearance: Normal appearance. She is not ill-appearing or toxic-appearing.   HENT:      Head: Normocephalic.      Mouth/Throat:      Mouth: Mucous membranes are dry.   Cardiovascular:      Rate and Rhythm: Normal rate and regular rhythm.      Heart sounds: Murmur heard.   Pulmonary:      Effort: Pulmonary effort is normal. No respiratory distress.      Breath sounds: Normal breath sounds.   Abdominal:      General: Abdomen is flat. Bowel sounds are normal. There is no distension.      Palpations: Abdomen is soft.      Tenderness: There is abdominal tenderness. There is no guarding.      Comments: Left-sided ostomy   Skin:     General: Skin is warm and dry.      Findings: Rash (+ rosacea) present.   Neurological:      Mental Status: She is alert and oriented to person, place, and time.   Psychiatric:         Behavior: Behavior normal.         Judgment: Judgment normal.      Comments: Tearful           Labs:  Recent Labs     10/21/22  1850 10/22/22  0236   WBC 8.4 6.3   RBC 4.38 4.33   HEMOGLOBIN 13.1 12.7   HEMATOCRIT 39.5 39.6   MCV 90.2 91.5   MCH 29.9 29.3   MCHC 33.2* 32.1*   RDW 41.5 42.4   PLATELETCT 233 173   MPV 10.4 10.4     Recent Labs     10/21/22  1850 10/22/22  0236   SODIUM 137 140   POTASSIUM 4.0 3.6   CHLORIDE 105 110   CO2 20 19*   GLUCOSE 96 83   BUN 19 15           Recent Labs     10/21/22  1850   ASTSGOT 25   ALTSGPT 18   TBILIRUBIN 0.4   ALKPHOSPHAT 116*   GLOBULIN 2.9          Imaging:  MR-PELVIS W/O  Narrative: 10/19/2022 2:39 PM    HISTORY/REASON FOR EXAM: Years of worsening pain. A sacroiliac dysfunction per referring clinician orders.    TECHNIQUE/EXAM DESCRIPTION:  MRI of the pelvis/sacroiliac joints without contrast.    The study was performed on a Siemens Skyra 3.0 Mercedes MRI scanner.    T1 axial, T1 coronal, T2 fast spin-echo fat-suppressed axial, and T2 fast spin-echo fat-suppressed coronal images were obtained of the pelvis.    COMPARISON: 8/5/2022 radiographs    FINDINGS:  SI joints are normal in appearance without effusion, edema, erosion or bony spurring. There is no widening. There is no fusion    Caudal lumbar operative change with lateral mass bony fusion, laminectomy is incompletely characterized but no edema is noted and there is only mild facet arthropathy at the lumbosacral junction    Sacrum and coccyx have considerable fatty marrow replacement with no evidence of insufficiency or posttraumatic fracture.    Limited visualization of the femoral acetabular joints shows no abnormal effusion or gross labral tear. No marrow edema    Review of the pelvis shows there has been prior colectomy. There is a normal size uterus with a 6 mm posterior cyst and/or leiomyoma. No cystic adnexal mass. No free fluid. No adenopathy. Mostly decompressed bladder  Impression: 1.  Normal SI joints, sacrum, coccyx and visualized femoral acetabular joints    2.  Caudal lumbar operative change with mature bony fusion, laminectomy and mild lumbosacral junction facet hypertrophy. No lumbosacral junction degenerative disc disease            Impressions:  This is a 67 y.o. female with multiple chronic medical conditions, including chronic dysphagia (stays on thickened diet), Crohn's disease, esophageal stricture with hx of dilation, s/p ileostomy secondary to Crohn's who presented 10/21/2022 with evaluation for dysphagia and esophageal strictures. She was sent to the ER by her PCP due to  severe esophageal strictures and an inability to eat or drink well for the past few days.    The patient  last had an EGD with dilation for esophageal strictures on 5/22/2019, which showed hiatal hernia and a mild Schatzki ring. She underwent a small bowel follow through on 9/27/22, which revealed two severe strictures and partial obstruction. She was seen by her PCP on 10/21 for discussion of outpatient abnormal barium swallow study and was subsequently referred to ER for further evaluation. The patient was admitted to medical service and GI has been consulted for further evaluation and management of her esophageal strictures.    #Esophageal strictures  #Dysphagia  #Crohn's disease    Recommendations:  - Patient will undergo esophageal dilation with Dr Davalos on Monday, 10/24  - Recommend IV medication as much as possible, as patient unable to swallow even saliva  - Analgesics prn  - Suction per patient prn  - NPO Sunday, 10/23, at midnight for procedure on 10/24      This note was generated using voice recognition software which has a small chance of producing errors of grammar and possibly content. I have made every reasonable attempt to find and correct any obvious errors, but expect that some may not be found prior to finalization of this note.

## 2022-10-22 NOTE — PROGRESS NOTES
4 Eyes Skin Assessment Completed by CAMERON Valadez and CAMERON Justin.    Head WDL  Ears WDL  Nose WDL  Mouth WDL  Neck WDL  Breast/Chest WDL  Shoulder Blades Redness and Blanching  Spine Redness and Blanching  (R) Arm/Elbow/Hand Blanching  (L) Arm/Elbow/Hand Blanching  Abdomen WDL - ostomy in place  Groin WDL  Scrotum/Coccyx/Buttocks WDL - previous pressure injury healed and pink  (R) Leg WDL  (L) Leg WDL  (R) Heel/Foot/Toe Redness and Blanching  (L) Heel/Foot/Toe Redness and Blanching          Devices In Places Blood Pressure Cuff      Interventions In Place Pillows    Possible Skin Injury No    Pictures Uploaded Into Epic No, needs to be completed  Wound Consult Placed N/A  RN Wound Prevention Protocol Ordered No

## 2022-10-23 PROCEDURE — 99226 PR SUBSEQUENT OBSERVATION CARE,LEVEL III: CPT | Performed by: GENERAL PRACTICE

## 2022-10-23 PROCEDURE — 700105 HCHG RX REV CODE 258: Performed by: GENERAL PRACTICE

## 2022-10-23 PROCEDURE — 99224 PR SUBSEQUENT OBSERVATION CARE,LEVEL I: CPT | Performed by: NURSE PRACTITIONER

## 2022-10-23 PROCEDURE — 770006 HCHG ROOM/CARE - MED/SURG/GYN SEMI*

## 2022-10-23 PROCEDURE — G0378 HOSPITAL OBSERVATION PER HR: HCPCS

## 2022-10-23 PROCEDURE — 700111 HCHG RX REV CODE 636 W/ 250 OVERRIDE (IP): Performed by: GENERAL PRACTICE

## 2022-10-23 RX ADMIN — METOCLOPRAMIDE 10 MG: 5 INJECTION, SOLUTION INTRAMUSCULAR; INTRAVENOUS at 05:47

## 2022-10-23 RX ADMIN — METOCLOPRAMIDE 10 MG: 5 INJECTION, SOLUTION INTRAMUSCULAR; INTRAVENOUS at 17:17

## 2022-10-23 RX ADMIN — HYDROMORPHONE HYDROCHLORIDE 1 MG: 1 INJECTION, SOLUTION INTRAMUSCULAR; INTRAVENOUS; SUBCUTANEOUS at 12:56

## 2022-10-23 RX ADMIN — DIAZEPAM 5 MG: 5 INJECTION, SOLUTION INTRAMUSCULAR; INTRAVENOUS at 05:48

## 2022-10-23 RX ADMIN — HYDROMORPHONE HYDROCHLORIDE 1 MG: 1 INJECTION, SOLUTION INTRAMUSCULAR; INTRAVENOUS; SUBCUTANEOUS at 00:19

## 2022-10-23 RX ADMIN — HYDROMORPHONE HYDROCHLORIDE 1 MG: 1 INJECTION, SOLUTION INTRAMUSCULAR; INTRAVENOUS; SUBCUTANEOUS at 08:55

## 2022-10-23 RX ADMIN — DIAZEPAM 5 MG: 5 INJECTION, SOLUTION INTRAMUSCULAR; INTRAVENOUS at 23:21

## 2022-10-23 RX ADMIN — HYDROMORPHONE HYDROCHLORIDE 1 MG: 1 INJECTION, SOLUTION INTRAMUSCULAR; INTRAVENOUS; SUBCUTANEOUS at 04:49

## 2022-10-23 RX ADMIN — DIAZEPAM 5 MG: 5 INJECTION, SOLUTION INTRAMUSCULAR; INTRAVENOUS at 17:15

## 2022-10-23 RX ADMIN — METOCLOPRAMIDE 10 MG: 5 INJECTION, SOLUTION INTRAMUSCULAR; INTRAVENOUS at 23:21

## 2022-10-23 RX ADMIN — DIAZEPAM 5 MG: 5 INJECTION, SOLUTION INTRAMUSCULAR; INTRAVENOUS at 11:53

## 2022-10-23 RX ADMIN — SODIUM CHLORIDE: 9 INJECTION, SOLUTION INTRAVENOUS at 13:03

## 2022-10-23 RX ADMIN — HYDROMORPHONE HYDROCHLORIDE 1 MG: 1 INJECTION, SOLUTION INTRAMUSCULAR; INTRAVENOUS; SUBCUTANEOUS at 21:17

## 2022-10-23 RX ADMIN — HYDROMORPHONE HYDROCHLORIDE 1 MG: 1 INJECTION, SOLUTION INTRAMUSCULAR; INTRAVENOUS; SUBCUTANEOUS at 17:17

## 2022-10-23 RX ADMIN — METOCLOPRAMIDE 10 MG: 5 INJECTION, SOLUTION INTRAMUSCULAR; INTRAVENOUS at 11:53

## 2022-10-23 ASSESSMENT — PAIN DESCRIPTION - PAIN TYPE
TYPE: ACUTE PAIN

## 2022-10-23 ASSESSMENT — ENCOUNTER SYMPTOMS: SORE THROAT: 1

## 2022-10-23 NOTE — PROGRESS NOTES
Hospital Medicine Daily Progress Note    Date of Service  10/23/2022    Chief Complaint  Jana Overton is a 67 y.o. female admitted 10/21/2022 with dysphagia    Hospital Course  This is a 67-year-old female with past medical history of lupus on Plaquenil, Crohn's disease s/p ileostomy, fibromyalgia, history of esophageal stricture requiring dilatation who was admitted on 10/21/2022 with dysphagia.    Patient reports she has not been able to eat or drink for the past several days.  ERP consulted GI, plan for EGD and dilatation on 10/24.     Interval Problem Update  GI was consulted, plan for EGD and dilatation on 10/24.  Possible may need more than 1 during this admission.    Once cleared by GI and tolerating diet, then patient can discharge home.    I have discussed this patient's plan of care and discharge plan at IDT rounds today with Case Management, Nursing, Nursing leadership, and other members of the IDT team.    Consultants/Specialty  GI    Code Status  Full Code    Disposition  Patient is not medically cleared for discharge.   Anticipate discharge to to home with close outpatient follow-up.  I have placed the appropriate orders for post-discharge needs.    Review of Systems  Review of Systems   HENT:  Positive for sore throat.    All other systems reviewed and are negative.     Physical Exam  Temp:  [36.6 °C (97.9 °F)-37.1 °C (98.7 °F)] 37.1 °C (98.7 °F)  Pulse:  [75-91] 88  Resp:  [16-18] 18  BP: (117-157)/(64-76) 157/76  SpO2:  [92 %-98 %] 96 %    Physical Exam  Vitals and nursing note reviewed.   Constitutional:       General: She is not in acute distress.     Appearance: Normal appearance.   HENT:      Head: Normocephalic and atraumatic.      Mouth/Throat:      Mouth: Mucous membranes are moist.      Pharynx: No oropharyngeal exudate.   Eyes:      Extraocular Movements: Extraocular movements intact.      Pupils: Pupils are equal, round, and reactive to light.   Cardiovascular:      Rate and Rhythm:  Normal rate and regular rhythm.      Pulses: Normal pulses.      Heart sounds: No murmur heard.    No friction rub. No gallop.   Pulmonary:      Effort: Pulmonary effort is normal. No respiratory distress.      Breath sounds: No wheezing, rhonchi or rales.   Abdominal:      General: Bowel sounds are normal. There is no distension.      Palpations: Abdomen is soft. There is no mass.      Tenderness: There is no abdominal tenderness.   Musculoskeletal:         General: No swelling or tenderness. Normal range of motion.      Cervical back: Normal range of motion. No rigidity. No muscular tenderness.      Right lower leg: No edema.      Left lower leg: No edema.   Skin:     General: Skin is warm and dry.      Capillary Refill: Capillary refill takes less than 2 seconds.      Findings: No erythema or rash.   Neurological:      General: No focal deficit present.      Mental Status: She is alert and oriented to person, place, and time.      Motor: No weakness.      Gait: Gait normal.       Fluids  No intake or output data in the 24 hours ending 10/23/22 1059      Laboratory  Recent Labs     10/21/22  1850 10/22/22  0236   WBC 8.4 6.3   RBC 4.38 4.33   HEMOGLOBIN 13.1 12.7   HEMATOCRIT 39.5 39.6   MCV 90.2 91.5   MCH 29.9 29.3   MCHC 33.2* 32.1*   RDW 41.5 42.4   PLATELETCT 233 173   MPV 10.4 10.4       Recent Labs     10/21/22  1850 10/22/22  0236   SODIUM 137 140   POTASSIUM 4.0 3.6   CHLORIDE 105 110   CO2 20 19*   GLUCOSE 96 83   BUN 19 15   CREATININE 1.04 0.86   CALCIUM 9.5 8.7                     Imaging  No orders to display          Assessment/Plan  * Esophageal stricture  Assessment & Plan  Patient reports she has not been able to eat or drink for the past several days.  ERP consulted GI, plan for EGD and dilatation on 10/24.     Dysphagia- (present on admission)  Assessment & Plan  Abnormal outpatient barium swallow  GI consulted    Fibromyalgia syndrome- (present on admission)  Assessment & Plan  Continue home  regimen    Positive VAHID (1:640 homogenous pattern with negative reflex)- (present on admission)  Assessment & Plan  History of lupus per patient  Hydroxychloroquine    Oropharyngeal dysphagia- (present on admission)  Assessment & Plan  Plan as above    Ileostomy in place (HCC)- (present on admission)  Assessment & Plan  Ostomy care    GERD (gastroesophageal reflux disease)- (present on admission)  Assessment & Plan  PPI         VTE prophylaxis: enoxaparin ppx    I have performed a physical exam and reviewed and updated ROS and Plan today (10/23/2022). In review of yesterday's note (10/22/2022), there are no changes except as documented above.

## 2022-10-23 NOTE — CARE PLAN
The patient is Stable - Low risk of patient condition declining or worsening    Shift Goals  Clinical Goals: pain cotrol, comfort  Patient Goals: rest    Progress made toward(s) clinical / shift goals:  Patients pain well controled.    Patient is not progressing towards the following goals:

## 2022-10-23 NOTE — PROGRESS NOTES
GI team saw the patient at bedside today and.  To review the plan for tomorrow.  No change of clinical situation, no change over the exam.  GI team will perform upper GI scope and dietitian to follow patient on October 24 for her dysphagia.  Please refer to Roxana Estrada's  APRN progress note today for details.

## 2022-10-23 NOTE — PROGRESS NOTES
Gastroenterology Progress Note               Author:  MARCO Aguila Date & Time Created: 10/23/2022 10:34 AM       Patient ID:  Name:             Jana Overton  YOB: 1955  Age:                 67 y.o.  female  MRN:               [id number]        Medical Decision Making, by Problem:  Active Hospital Problems    Diagnosis     Esophageal stricture [K22.2]     Dysphagia [R13.10]     Fibromyalgia syndrome [M79.7]     Positive VAHID (1:640 homogenous pattern with negative reflex) [R76.8]     Oropharyngeal dysphagia [R13.12]     Ileostomy in place (HCC) [Z93.2]     GERD (gastroesophageal reflux disease) [K21.9]            Presenting Chief Complaint:  This is a 67 y.o. female with multiple chronic medical conditions, including lupus, chronic dysphagia, Crohn's disease, esophageal stricture with hx of dilation, s/p ileostomy secondary to Crohn's who presented 10/21/2022 with evaluation for worsening dysphagia and esophageal strictures. She was sent to the ER by her PCP due to severe esophageal strictures and an inability to eat or drink well for the past few days.      Interval History:  Patient seen and examined. She appears much less anxious today. She reports feeling significantly better than she did yesterday, as her abdominal pain is better controlled. She is using puréed food and thickened liquids to keep her mouth moist, but is still unable to pass even own secretions. She appreciates using bedside suction to remove secretions/food, but says she coughs even when small amounts of secretions/food go down her esophagus. She has intermittent nausea, no vomiting. She has no other complaints today. Agreeable to plan for esophageal dilation with Dr Davalos tomorrow.        Hospital Medications:  Current Facility-Administered Medications   Medication Dose Frequency Provider Last Rate Last Admin    [Held by provider] phosphorus (K-Phos-Neutral) per tablet 500 mg  500 mg Q6HRS Mayr Thorne D.O.         oxyCODONE immediate-release (ROXICODONE) tablet 5 mg  5 mg Q3HRS PRN Mary Thorne, D.O.        Or    oxyCODONE immediate release (ROXICODONE) tablet 10 mg  10 mg Q3HRS PRN Mary Thorne, D.O.        senna-docusate (PERICOLACE or SENOKOT S) 8.6-50 MG per tablet 2 Tablet  2 Tablet BID PRN Mary Thorne D.O.        And    polyethylene glycol/lytes (MIRALAX) PACKET 1 Packet  1 Packet QDAY PRN Mary Thorne, D.O.        And    magnesium hydroxide (MILK OF MAGNESIA) suspension 30 mL  30 mL QDAY PRN Mary Thorne, D.O.        And    bisacodyl (DULCOLAX) suppository 10 mg  10 mg QDAY PRN Mary Thorne, D.O.        HYDROmorphone (Dilaudid) injection 1 mg  1 mg Q4HRS PRN Mary Thorne D.O.   1 mg at 10/23/22 0855    ketorolac (TORADOL) injection 15 mg  15 mg Q6HRS PRN Mary Thorne, D.O.        metoclopramide (REGLAN) injection 10 mg  10 mg Q6HRS Mary Thorne, D.O.   10 mg at 10/23/22 0547    diazePAM (VALIUM) injection 5 mg  5 mg Q6HRS Mary Thorne, D.O.   5 mg at 10/23/22 0548    NS infusion   Continuous Mary Thorne D.O. 83 mL/hr at 10/22/22 2059 New Bag at 10/22/22 2059    diazePAM (VALIUM) tablet 5 mg  5 mg Q6HRS PRN Layton Cai M.D.        furosemide (LASIX) tablet 20 mg  20 mg QDAY PRN Layton Cai M.D.        ondansetron (ZOFRAN ODT) dispertab 4 mg  4 mg Q12HRS PRN Layton Cai M.D.        [Held by provider] hydroxychloroquine (Plaquenil) tablet 300 mg  300 mg DAILY Layton Cai M.D.        melatonin tablet 5 mg  5 mg QHS PRN Layton Chaung, M.D.        [Held by provider] metoprolol tartrate (LOPRESSOR) tablet 50 mg  50 mg BID Layton Cai M.D.        [Held by provider] spironolactone (ALDACTONE) tablet 50 mg  50 mg DAILY Layton Cai M.D.        [Held by provider] traZODone (DESYREL) tablet 50 mg  50 mg Q EVENING Layton Cai M.D.        acetaminophen (Tylenol) tablet 650 mg  650 mg Q6HRS PRN Layton Cai M.D.        labetalol (NORMODYNE/TRANDATE) injection 10 mg  10 mg Q4HRS PRN Layton  ROXANNE Cai        ondansetron (ZOFRAN) syringe/vial injection 4 mg  4 mg Q4HRS PRN Layton Cai M.D.   4 mg at 10/22/22 1124    guaiFENesin dextromethorphan (ROBITUSSIN DM) 100-10 MG/5ML syrup 10 mL  10 mL Q6HRS PRN Layton Cai M.D.       Last reviewed on 10/21/2022  8:21 PM by Keith Calle       Vital signs:  Weight/BMI: Body mass index is 22.13 kg/m².  BP (!) 157/76   Pulse 88   Temp 37.1 °C (98.7 °F) (Temporal)   Resp 18   Ht 1.829 m (6')   Wt 74 kg (163 lb 2.3 oz)   SpO2 96%   Vitals:    10/22/22 1524 10/22/22 2000 10/23/22 0400 10/23/22 0808   BP: 117/64 138/68 138/65 (!) 157/76   Pulse: 91 86 75 88   Resp: 16 17 17 18   Temp: 36.9 °C (98.5 °F) 36.7 °C (98 °F) 36.6 °C (97.9 °F) 37.1 °C (98.7 °F)   TempSrc: Temporal Temporal Temporal Temporal   SpO2: 95% 92% 98% 96%   Weight:       Height:         Oxygen Therapy:  Pulse Oximetry: 96 %, O2 (LPM): 0, O2 Delivery Device: None - Room Air  No intake or output data in the 24 hours ending 10/23/22 1034      Physical Exam:  Vitals and nursing note reviewed.   Constitutional:       General: She is in no distress,     Appearance: Normal appearance. She is not ill-appearing or toxic-appearing.   HENT:      Mouth: Mucous membranes are dry.   Abdominal:      General: Abdomen is flat. Bowel sounds are normal. There is no distension.      Palpations: Abdomen is soft.      Tenderness: There is mild abdominal tenderness. No guarding.      Comments: Left-sided ostomy   Skin:     General: Skin is warm and dry.      Findings: facial rash due to lupus/rosacea   Neurological:      Mental Status: She is alert and oriented to person, place, and time.   Psychiatric:         Behavior: Behavior normal.         Judgment: Judgment normal.             Labs:  Recent Labs     10/21/22  1850 10/22/22  0236   SODIUM 137 140   POTASSIUM 4.0 3.6   CHLORIDE 105 110   CO2 20 19*   BUN 19 15   CREATININE 1.04 0.86   MAGNESIUM  --  1.8   PHOSPHORUS  --  1.9*   CALCIUM 9.5 8.7      Recent Labs     10/21/22  1850 10/22/22  0236   ALTSGPT 18  --    ASTSGOT 25  --    ALKPHOSPHAT 116*  --    TBILIRUBIN 0.4  --    LIPASE 38  --    GLUCOSE 96 83     Recent Labs     10/21/22  1850 10/22/22  0236   WBC 8.4 6.3   NEUTSPOLYS 74.30* 57.00   LYMPHOCYTES 15.60* 29.30   MONOCYTES 7.20 9.10   EOSINOPHILS 1.40 3.20   BASOPHILS 1.00 0.90   ASTSGOT 25  --    ALTSGPT 18  --    ALKPHOSPHAT 116*  --    TBILIRUBIN 0.4  --      Recent Labs     10/21/22  1850 10/22/22  0236   RBC 4.38 4.33   HEMOGLOBIN 13.1 12.7   HEMATOCRIT 39.5 39.6   PLATELETCT 233 173     No results found for this or any previous visit (from the past 24 hour(s)).    Radiology Review:  No orders to display         MDM (Data Review):   -Records reviewed and summarized in current documentation  -I personally reviewed and interpreted the laboratory results  -I personally reviewed the radiology images    Assessment/Recommendations:  #Esophageal strictures  #Dysphagia  #Crohn's disease     Recommendations:  - Esophageal dilation scheduled with Dr Davalos on Monday, 10/24  - NPO midnight tonight 10/23  - Recommend outpatient GI follow up for Crohn's            Core Quality Measures   Reviewed items::  Labs, Medications and Radiology reports reviewed

## 2022-10-24 ENCOUNTER — ANESTHESIA (OUTPATIENT)
Dept: SURGERY | Facility: MEDICAL CENTER | Age: 67
DRG: 392 | End: 2022-10-24
Payer: MEDICARE

## 2022-10-24 VITALS
OXYGEN SATURATION: 97 % | BODY MASS INDEX: 22.1 KG/M2 | RESPIRATION RATE: 16 BRPM | SYSTOLIC BLOOD PRESSURE: 156 MMHG | DIASTOLIC BLOOD PRESSURE: 93 MMHG | HEIGHT: 72 IN | HEART RATE: 94 BPM | WEIGHT: 163.14 LBS | TEMPERATURE: 97.3 F

## 2022-10-24 PROBLEM — R13.0 APHAGIA: Status: ACTIVE | Noted: 2022-10-24

## 2022-10-24 LAB
ANION GAP SERPL CALC-SCNC: 10 MMOL/L (ref 7–16)
BUN SERPL-MCNC: 6 MG/DL (ref 8–22)
CALCIUM SERPL-MCNC: 9 MG/DL (ref 8.5–10.5)
CHLORIDE SERPL-SCNC: 107 MMOL/L (ref 96–112)
CO2 SERPL-SCNC: 23 MMOL/L (ref 20–33)
CREAT SERPL-MCNC: 0.69 MG/DL (ref 0.5–1.4)
ERYTHROCYTE [DISTWIDTH] IN BLOOD BY AUTOMATED COUNT: 42.7 FL (ref 35.9–50)
GFR SERPLBLD CREATININE-BSD FMLA CKD-EPI: 94 ML/MIN/1.73 M 2
GLUCOSE SERPL-MCNC: 85 MG/DL (ref 65–99)
HCT VFR BLD AUTO: 36.8 % (ref 37–47)
HGB BLD-MCNC: 12.1 G/DL (ref 12–16)
INR PPP: 1.19 (ref 0.87–1.13)
MAGNESIUM SERPL-MCNC: 1.5 MG/DL (ref 1.5–2.5)
MCH RBC QN AUTO: 30 PG (ref 27–33)
MCHC RBC AUTO-ENTMCNC: 32.9 G/DL (ref 33.6–35)
MCV RBC AUTO: 91.3 FL (ref 81.4–97.8)
PHOSPHATE SERPL-MCNC: 2 MG/DL (ref 2.5–4.5)
PLATELET # BLD AUTO: 167 K/UL (ref 164–446)
PMV BLD AUTO: 10.3 FL (ref 9–12.9)
POTASSIUM SERPL-SCNC: 3.3 MMOL/L (ref 3.6–5.5)
PROTHROMBIN TIME: 14.9 SEC (ref 12–14.6)
RBC # BLD AUTO: 4.03 M/UL (ref 4.2–5.4)
SODIUM SERPL-SCNC: 140 MMOL/L (ref 135–145)
WBC # BLD AUTO: 5.6 K/UL (ref 4.8–10.8)

## 2022-10-24 PROCEDURE — 700102 HCHG RX REV CODE 250 W/ 637 OVERRIDE(OP): Performed by: HOSPITALIST

## 2022-10-24 PROCEDURE — 84100 ASSAY OF PHOSPHORUS: CPT

## 2022-10-24 PROCEDURE — 00731 ANES UPR GI NDSC PX NOS: CPT | Performed by: ANESTHESIOLOGY

## 2022-10-24 PROCEDURE — 0D748ZZ DILATION OF ESOPHAGOGASTRIC JUNCTION, VIA NATURAL OR ARTIFICIAL OPENING ENDOSCOPIC: ICD-10-PCS | Performed by: INTERNAL MEDICINE

## 2022-10-24 PROCEDURE — 85027 COMPLETE CBC AUTOMATED: CPT

## 2022-10-24 PROCEDURE — 83735 ASSAY OF MAGNESIUM: CPT

## 2022-10-24 PROCEDURE — A9270 NON-COVERED ITEM OR SERVICE: HCPCS | Performed by: HOSPITALIST

## 2022-10-24 PROCEDURE — 92610 EVALUATE SWALLOWING FUNCTION: CPT

## 2022-10-24 PROCEDURE — 700101 HCHG RX REV CODE 250: Performed by: ANESTHESIOLOGY

## 2022-10-24 PROCEDURE — 160203 HCHG ENDO MINUTES - 1ST 30 MINS LEVEL 4: Performed by: INTERNAL MEDICINE

## 2022-10-24 PROCEDURE — 700105 HCHG RX REV CODE 258: Performed by: ANESTHESIOLOGY

## 2022-10-24 PROCEDURE — 160009 HCHG ANES TIME/MIN: Performed by: INTERNAL MEDICINE

## 2022-10-24 PROCEDURE — 160035 HCHG PACU - 1ST 60 MINS PHASE I: Performed by: INTERNAL MEDICINE

## 2022-10-24 PROCEDURE — 80048 BASIC METABOLIC PNL TOTAL CA: CPT

## 2022-10-24 PROCEDURE — 85610 PROTHROMBIN TIME: CPT

## 2022-10-24 PROCEDURE — 700111 HCHG RX REV CODE 636 W/ 250 OVERRIDE (IP): Performed by: ANESTHESIOLOGY

## 2022-10-24 PROCEDURE — 99239 HOSP IP/OBS DSCHRG MGMT >30: CPT | Performed by: HOSPITALIST

## 2022-10-24 PROCEDURE — 700111 HCHG RX REV CODE 636 W/ 250 OVERRIDE (IP): Performed by: GENERAL PRACTICE

## 2022-10-24 PROCEDURE — G0378 HOSPITAL OBSERVATION PER HR: HCPCS

## 2022-10-24 PROCEDURE — 160048 HCHG OR STATISTICAL LEVEL 1-5: Performed by: INTERNAL MEDICINE

## 2022-10-24 PROCEDURE — 160002 HCHG RECOVERY MINUTES (STAT): Performed by: INTERNAL MEDICINE

## 2022-10-24 PROCEDURE — 43249 ESOPH EGD DILATION <30 MM: CPT | Performed by: INTERNAL MEDICINE

## 2022-10-24 PROCEDURE — C1726 CATH, BAL DIL, NON-VASCULAR: HCPCS | Performed by: INTERNAL MEDICINE

## 2022-10-24 RX ORDER — OXYCODONE HCL 5 MG/5 ML
5 SOLUTION, ORAL ORAL
Status: DISCONTINUED | OUTPATIENT
Start: 2022-10-24 | End: 2022-10-24 | Stop reason: HOSPADM

## 2022-10-24 RX ORDER — OMEPRAZOLE 20 MG/1
20 TABLET, DELAYED RELEASE ORAL DAILY
Qty: 30 TABLET | Refills: 0 | Status: SHIPPED
Start: 2022-10-24 | End: 2022-12-06

## 2022-10-24 RX ORDER — DIPHENHYDRAMINE HYDROCHLORIDE 50 MG/ML
12.5 INJECTION INTRAMUSCULAR; INTRAVENOUS
Status: DISCONTINUED | OUTPATIENT
Start: 2022-10-24 | End: 2022-10-24 | Stop reason: HOSPADM

## 2022-10-24 RX ORDER — ROCURONIUM BROMIDE 10 MG/ML
INJECTION, SOLUTION INTRAVENOUS PRN
Status: DISCONTINUED | OUTPATIENT
Start: 2022-10-24 | End: 2022-10-24 | Stop reason: SURG

## 2022-10-24 RX ORDER — SODIUM CHLORIDE, SODIUM LACTATE, POTASSIUM CHLORIDE, CALCIUM CHLORIDE 600; 310; 30; 20 MG/100ML; MG/100ML; MG/100ML; MG/100ML
INJECTION, SOLUTION INTRAVENOUS CONTINUOUS
Status: DISCONTINUED | OUTPATIENT
Start: 2022-10-24 | End: 2022-10-24 | Stop reason: HOSPADM

## 2022-10-24 RX ORDER — LIDOCAINE HYDROCHLORIDE 20 MG/ML
INJECTION, SOLUTION EPIDURAL; INFILTRATION; INTRACAUDAL; PERINEURAL PRN
Status: DISCONTINUED | OUTPATIENT
Start: 2022-10-24 | End: 2022-10-24 | Stop reason: SURG

## 2022-10-24 RX ORDER — OXYCODONE HCL 5 MG/5 ML
10 SOLUTION, ORAL ORAL
Status: DISCONTINUED | OUTPATIENT
Start: 2022-10-24 | End: 2022-10-24 | Stop reason: HOSPADM

## 2022-10-24 RX ORDER — SODIUM CHLORIDE, SODIUM LACTATE, POTASSIUM CHLORIDE, CALCIUM CHLORIDE 600; 310; 30; 20 MG/100ML; MG/100ML; MG/100ML; MG/100ML
INJECTION, SOLUTION INTRAVENOUS
Status: DISCONTINUED | OUTPATIENT
Start: 2022-10-24 | End: 2022-10-24 | Stop reason: SURG

## 2022-10-24 RX ORDER — DEXAMETHASONE SODIUM PHOSPHATE 4 MG/ML
INJECTION, SOLUTION INTRA-ARTICULAR; INTRALESIONAL; INTRAMUSCULAR; INTRAVENOUS; SOFT TISSUE PRN
Status: DISCONTINUED | OUTPATIENT
Start: 2022-10-24 | End: 2022-10-24 | Stop reason: SURG

## 2022-10-24 RX ORDER — LABETALOL HYDROCHLORIDE 5 MG/ML
5 INJECTION, SOLUTION INTRAVENOUS
Status: DISCONTINUED | OUTPATIENT
Start: 2022-10-24 | End: 2022-10-24 | Stop reason: HOSPADM

## 2022-10-24 RX ORDER — ONDANSETRON 2 MG/ML
INJECTION INTRAMUSCULAR; INTRAVENOUS PRN
Status: DISCONTINUED | OUTPATIENT
Start: 2022-10-24 | End: 2022-10-24 | Stop reason: SURG

## 2022-10-24 RX ORDER — ONDANSETRON 2 MG/ML
4 INJECTION INTRAMUSCULAR; INTRAVENOUS
Status: DISCONTINUED | OUTPATIENT
Start: 2022-10-24 | End: 2022-10-24 | Stop reason: HOSPADM

## 2022-10-24 RX ORDER — HALOPERIDOL 5 MG/ML
1 INJECTION INTRAMUSCULAR
Status: DISCONTINUED | OUTPATIENT
Start: 2022-10-24 | End: 2022-10-24 | Stop reason: HOSPADM

## 2022-10-24 RX ADMIN — SUGAMMADEX 200 MG: 100 INJECTION, SOLUTION INTRAVENOUS at 09:19

## 2022-10-24 RX ADMIN — PROPOFOL 120 MG: 10 INJECTION, EMULSION INTRAVENOUS at 09:07

## 2022-10-24 RX ADMIN — HYDROMORPHONE HYDROCHLORIDE 1 MG: 1 INJECTION, SOLUTION INTRAMUSCULAR; INTRAVENOUS; SUBCUTANEOUS at 06:11

## 2022-10-24 RX ADMIN — DIAZEPAM 5 MG: 5 INJECTION, SOLUTION INTRAMUSCULAR; INTRAVENOUS at 05:37

## 2022-10-24 RX ADMIN — LIDOCAINE HYDROCHLORIDE 100 MG: 20 INJECTION, SOLUTION EPIDURAL; INFILTRATION; INTRACAUDAL at 09:07

## 2022-10-24 RX ADMIN — HYDROMORPHONE HYDROCHLORIDE 1 MG: 1 INJECTION, SOLUTION INTRAMUSCULAR; INTRAVENOUS; SUBCUTANEOUS at 02:00

## 2022-10-24 RX ADMIN — DEXAMETHASONE SODIUM PHOSPHATE 4 MG: 4 INJECTION, SOLUTION INTRA-ARTICULAR; INTRALESIONAL; INTRAMUSCULAR; INTRAVENOUS; SOFT TISSUE at 09:07

## 2022-10-24 RX ADMIN — DIAZEPAM 5 MG: 5 INJECTION, SOLUTION INTRAMUSCULAR; INTRAVENOUS at 13:17

## 2022-10-24 RX ADMIN — METOCLOPRAMIDE 10 MG: 5 INJECTION, SOLUTION INTRAMUSCULAR; INTRAVENOUS at 04:21

## 2022-10-24 RX ADMIN — HYDROMORPHONE HYDROCHLORIDE 1 MG: 1 INJECTION, SOLUTION INTRAMUSCULAR; INTRAVENOUS; SUBCUTANEOUS at 15:26

## 2022-10-24 RX ADMIN — HYDROMORPHONE HYDROCHLORIDE 1 MG: 1 INJECTION, SOLUTION INTRAMUSCULAR; INTRAVENOUS; SUBCUTANEOUS at 10:46

## 2022-10-24 RX ADMIN — ONDANSETRON 4 MG: 2 INJECTION INTRAMUSCULAR; INTRAVENOUS at 09:07

## 2022-10-24 RX ADMIN — ROCURONIUM BROMIDE 50 MG: 10 INJECTION, SOLUTION INTRAVENOUS at 09:07

## 2022-10-24 RX ADMIN — SODIUM CHLORIDE, POTASSIUM CHLORIDE, SODIUM LACTATE AND CALCIUM CHLORIDE: 600; 310; 30; 20 INJECTION, SOLUTION INTRAVENOUS at 09:03

## 2022-10-24 RX ADMIN — METOCLOPRAMIDE 10 MG: 5 INJECTION, SOLUTION INTRAMUSCULAR; INTRAVENOUS at 13:16

## 2022-10-24 RX ADMIN — POTASSIUM BICARBONATE 25 MEQ: 978 TABLET, EFFERVESCENT ORAL at 13:17

## 2022-10-24 ASSESSMENT — PAIN DESCRIPTION - PAIN TYPE
TYPE: CHRONIC PAIN
TYPE: CHRONIC PAIN
TYPE: ACUTE PAIN
TYPE: SURGICAL PAIN
TYPE: SURGICAL PAIN
TYPE: ACUTE PAIN
TYPE: SURGICAL PAIN

## 2022-10-24 ASSESSMENT — PAIN SCALES - GENERAL: PAIN_LEVEL: 2

## 2022-10-24 NOTE — PROCEDURES
OPERATIVE REPORT    PATIENT:   Jana Overton   1955       PREOPERATIVE DIAGNOSES/INDICATIONS: Esophageal stricture    POSTOPERATIVE DIAGNOSIS: stricture s/p dilatation.     PROCEDURE:  ESOPHAGOGASTRODUODENOSCOPY    PHYSICIAN:  Jamarcus Davalos MD. MPH.    ANESTHESIA:  Per anesthesiologist.    LOCATION: [facility]    CONSENT:  OBTAINED. The risks, benefits and alternatives of the procedure were discussed in details. The risks include and are not limited to bleeding, infection, perforation, missed lesions, and sedations risks (cardiopulmonary compromise and allergic reaction to medications).    DESCRIPTION: The patient presented to the procedure room.  After routine checkup was performed, patient was brought into the endoscopy suite.  Patient was placed on his left lateral decubitus position. A bite block was placed in patient's mouth. Patient was sedated by anesthesia.  Vital signs were monitored throughout the procedure.  Oxygenation support was provided throughout the procedure. Upper endoscope was inserted into patient's mouth and advanced to the second portion of the duodenum under direct visualization.      Once the site was reached and examined, the upper endoscope was withdrawn.  Retroflexion was made within the stomach.  The stomach was decompressed, scope was withdrawn and the procedure was terminated.    The patient tolerated the procedure well.  There were no immediate complications.    OPERATIVE FINDINGS:    1. Esophagus: Tortuous esophagus. Z line at 36cm. GERD grade A. Moderate stenosis at GE junction. S/p Balloon dilatation upto 11mm with some resistance and mucosal damage. 10mm balloon sweep slowly upward, no 2nd stenosis is identified.   2. Stomach: Grossly normal.   3. Duodenum: Grossly normal.     IMPRESSION/RECOMMENDATIONS:  Most likely peptic stricture at the GE junction. She had multiple biopsy in the past, not repeated today.   The patient could resume diet, starting from clear liquid. Have a  speech team to see her before discharge.   She needs to go back to her outpatient GI team ([facility]?) and consider repeat dilatation in 4-6 weeks.     Keep 20mg ppi per day for now.   GI signs off.     This note has been transcribed with digital voice recognition software and although it has been reviewed may contain grammatical or word errors

## 2022-10-24 NOTE — ANESTHESIA PREPROCEDURE EVALUATION
Case: [id number] Anesthesia Start Date/Time: 10/24/22 0903    Procedure: GASTROSCOPY (Esophagus) - possible dialation    Anesthesia type: General    Location: [facility] ROOM 26 / SURGERY SAME DAY [facility]    Surgeons: Jamarcus Davalos M.D.      67yioF with COPD, TRUDY, HTN, migraines, afib, GERD    Esophageal strictures x2 with hx of multiple dilations  Allergies reviewed  NPO    Relevant Problems   ANESTHESIA   (positive) Sleep apnea      PULMONARY   (positive) COPD (chronic obstructive pulmonary disease) (HCC)   (positive) History of MRSA infection   (positive) History of infection with vancomycin resistant Enterococcus (VRE)      NEURO   (positive) History of DVT (deep vein thrombosis)   (positive) History of MRSA infection   (positive) History of infection with vancomycin resistant Enterococcus (VRE)   (positive) Migraine      CARDIAC   (positive) DVT (deep venous thrombosis) (HCC)   (positive) Essential hypertension   (positive) Migraine   (positive) Paroxysmal atrial fibrillation (HCC)      GI   (positive) GERD (gastroesophageal reflux disease)         (positive) FLORES (acute kidney injury) (HCC)      Other   (positive) Inflammatory arthritis       Physical Exam    Airway   Mallampati: II       Cardiovascular - normal exam     Dental - normal exam           Pulmonary - normal exam     Abdominal - normal exam     Neurological - normal exam                 Anesthesia Plan    ASA 3   ASA physical status 3 criteria: COPD    Plan - general       Airway plan will be ETT          Induction: intravenous    Postoperative Plan: Postoperative administration of opioids is intended.    Pertinent diagnostic labs and testing reviewed    Informed Consent:    Anesthetic plan and risks discussed with patient.

## 2022-10-24 NOTE — THERAPY
"Missed Therapy     Patient Name: Jana Overton  Age:  67 y.o., Sex:  female  Medical Record #: [id number]  Today's Date: 10/23/2022     10/23/22 1646   Treatment Variance   Reason For Missed Therapy Medical - Other (Please Comment)   Total Treatment Time   SLP Time Spent Yes  (20)   Interdisciplinary Plan of Care Collaboration   IDT Collaboration with  Nursing   Collaboration Comments Orders received for CSE. Attempted to complete, however, pt declining CSE at this time. Pt reports \"immediately vomiting up whatever I eat\" and that she has to suction out all PO. Pt did report that the only texture she can keep down small portions of is \"thickened applejuice\". Pt declining CSE this date and requesting it be completed after EGD 10/24.     "

## 2022-10-24 NOTE — ANESTHESIA TIME REPORT
Anesthesia Start and Stop Event Times     Date Time Event    10/24/2022 0840 Ready for Procedure     0903 Anesthesia Start     0934 Anesthesia Stop        Responsible Staff  10/24/22    Name Role Begin End    Darius Bui M.D. Anesth 0903 0934        Overtime Reason:  no overtime (within assigned shift)    Comments:

## 2022-10-24 NOTE — THERAPY
Speech Language Pathology   Clinical Swallow Evaluation     Patient Name: Jana Overton  AGE:  67 y.o., SEX:  female  Medical Record #: [id number]  Today's Date: 10/24/2022     Precautions  Precautions: Swallow Precautions ( See Comments)    HPI: is a 67-year-old female with history of esophageal stricture requiring dilatation who was admitted on 10/21/2022 with dysphagia. EGD and dilation on 10/24. SLP CSE on 6/2/22: MM5/TN0.    CMHx: Esophageal stricture, Crohn's disease, opioid dependence, GERD, oropharyngeal dysphagia, fibromyalgia  PMHx: DJD, DOPD, sleep apnea, DDD, PAF, FLORES, MRSA, DVT, anxiety, chronic respiratory failure, SIRS    No recent chest imaging.    Level of Consciousness: Alert, Awake  Affect/Behavior: Cooperative  Follows Directives: Yes  Orientation: Oriented x 4  Hearing: Functional hearing  Vision: Functional vision      Prior Living Situation & Level of Function:  Pt endorses extensive hx of oropharyngeal and esophageal dysphagia. Reports MBSS in 2007 - reduced UES opening w/ recommendation for chin tuck. Reports PU4 diet at baseline with both TN0 and MT2 liquids for comfort and ease of swallowing.     Of note - pt reports changes in speech clarity and vocal quality that she had planned to address in OP SLP prior to needing medical care. SLP did not notice articulation or vocal impairment this date.    Oral Mechanism Evaluation  Facial Symmetry: Equal  Facial Sensation: Equal  Labial Observations: WFL  Lingual Observations: Midline  Dentition: Good  Comments:      Voice  Quality: WFL  Resonance: WFL  Intensity: Appropriate  Cough: WFL  Comments:      Current Method of Nutrition   Oral diet (Clear liquids per GI)      Subjective  Pt very cooperative and agreeable w/ SLP evaluation. Receptive to recommendations, reliable historian.        Assessment  Positioning:  Standing per pt preference  Bolus Administration: Patient  Oxygen Requirements: Room Air  Factor(s) Affecting Performance:  Esophageal  dysphagia    Swallowing Trials  Thin Liquid (TN0): Impaired  Mildly Thick Liquid (MT2): Impaired  Liquidised (LQ3): Impaired  Pureed (PU4): Impaired    Comments:  Pt presented w/o s/sx concerning for airway invasion this date. No increased WOB, no cough/clear, no change in vocal quality. Pt endorsed no globus sensation in pharynx; however, reported significant retention in esophagus with all consistencies trialed. Pt unable to swallow TN0 water, expectorated into cup. Able to swallow TN0 juice with some difficulty but s/sx concerning for aspiration. Pt uses chin tuck per results of MBSS in 2007; SLP recommended use of Mendelsohn to increase UES opening. Pt reported some improvements in swallow with use of this technique. Discussed meds crushed/floated in puree d/t ease of swallow w/ puree vs. liquids.    SLP provided education to pt concerning reflux precautions: small bites/sips, slow rate of intake, alternating bites/sips, upright at 90*, upright and/or mobile after meals. Pt endorses use of all strategies at baseline.      Clinical Impressions  Pt appears at baseline for dysphagia. Recommend PU4/TN0 diet with small meals. Per preference and comfort, pt can have MT2 liquids as desired. Recommend STRICT reflux precautions as discussed and listed below. Recommend OP SLP w/ OP MBSS since prior study was in 2007.        Recommendations  1.  Puree solids with thin liquids. Pt can have MT2 if desired.   2.  Instrumentation:  Pt would benefit from outpatient SLP services, including outpatient MBSS to update POC for oropharyngeal dysphagia.   3.  Swallowing Instructions & Precautions:   Supervision: Independent  Positioning: Fully upright and midline during oral intake, Remain upright for 45+ minutes after oral intake  Medication: Whole with puree, Crush with applesauce or puree, as appropriate, As tolerated  Strategies: Small bites/sips, Alternate bites and sips, Slow rate of intake, Mendelsohn  Oral Care: BID  4.  Acute  "and OP SLP services      Plan    Recommend Speech Therapy 3 times per week until therapy goals are met for the following treatments:  Dysphagia Training and Patient / Family / Caregiver Education.    Discharge Recommendations: Recommend outpatient speech therapy services       Objective   10/24/22 7404   Initial Contact Note    Initial Contact Note  Order Received and Verified, Speech Therapy Evaluation in Progress with Full Report to Follow.   Vitals   O2 Delivery Device None - Room Air   Prior Living Situation   Prior Services None   Lives with - Patient's Self Care Capacity Spouse   Prior Level Of Function   Communication Within Functional Limits   Swallow Impaired   Dentition Intact   Patient's Primary Language English   Patient / Family Goals   Patient / Family Goal #1 \"I like to eat standing up\"   Short Term Goals   Short Term Goal # 1 Pt will consume PU4/TN0 diet (baseline) given reflux precautions w/ no overt s/sx of aspiration or worsening of lung status.   Education Group   Education Provided Dysphagia;Role of Speech Therapy   Dysphagia Patient Response Patient;Eager;Acceptance;Explanation;Verbal Demonstration;Action Demonstration   Role of SLP Patient Response Patient;Eager;Acceptance;Explanation;Verbal Demonstration   Problem List   Problem List Dysphagia   Anticipated Discharge Needs   Discharge Recommendations Recommend outpatient speech therapy services   Therapy Recommendations Upon DC Dysphagia Training;Patient / Family / Caregiver Education  (Outpatient MBSS)   Interdisciplinary Plan of Care Collaboration   Collaboration Comments RN, GI MD updated.     "

## 2022-10-24 NOTE — CARE PLAN
The patient is Stable - Low risk of patient condition declining or worsening    Shift Goals  Clinical Goals: Pain control  Patient Goals: npo after midnight rest and comfort    Problem: Fall Risk  Goal: Patient will remain free from falls  Outcome: Progressing     Problem: Pain - Standard  Goal: Alleviation of pain or a reduction in pain to the patient’s comfort goal  Outcome: Progressing       Progress made toward(s) clinical / shift goals:  Educated pt on plan of care all questions answered.    Patient is not progressing towards the following goals:

## 2022-10-24 NOTE — OR NURSING
0928 Patient arrived from OR. ID verified. Report received attached to monitors. Patient fully awake, denies pain, denies nausea. Vital signs stable.   0936 Dr Davalos at the bedside talking to patient.   0949 report given to aj BARNES all questions answered.   0958 Patient transported back to room with all her personal belongings.

## 2022-10-24 NOTE — THERAPY
Missed Therapy     Patient Name: Jana Overton  Age:  67 y.o., Sex:  female  Medical Record #: [id number]  Today's Date: 10/24/2022       10/24/22 0826   Treatment Variance   Reason For Missed Therapy Medical - Other (Please Comment)   Interdisciplinary Plan of Care Collaboration   Collaboration Comments Patient NPO for EGD today. Will HOLD dysphagia tx at this time.

## 2022-10-24 NOTE — DISCHARGE SUMMARY
Discharge Summary    CHIEF COMPLAINT ON ADMISSION  Chief Complaint   Patient presents with    Sent by MD     Sent by MD Kramer for severe esophageal stricture and inability to tolerate oral hydration and nutrition for the past few days. Pt endorses history crohn's, lupus, neck and back fusions, and renal dysfunction.       Reason for Admission  Sent by MD  for esophageal stricture    Admission Date  10/21/2022    CODE STATUS  Full Code    HPI & HOSPITAL COURSE    This is a 67-year-old female with past medical history of lupus on Plaquenil, Crohn's disease s/p ileostomy, fibromyalgia, history of esophageal stricture requiring dilatation who was admitted on 10/21/2022 with dysphagia.    Patient reports she has not been able to eat or drink for the past several days.  ERP consulted GI, plan for EGD and dilatation on 10/24.     Patient has had previous esophageal dilations in the past and has been on dysphagia diets in past.  She would like to go home today post EGD dilation after seen by SLP for diet texture recommendations.  She has been cleared to start clear liquid diet per GI and advance as tolerated.  Recommendation for prilosec 20mg po daily per GI.  Home with .  Replaced po potassium with potassium bicarbonate for K 3.3 prior to dc.    Therefore, she is discharged in good and stable condition to home with close outpatient follow-up.    The patient met 2-midnight criteria for an inpatient stay at the time of discharge.    Discharge Date  10/24/22    FOLLOW UP ITEMS POST DISCHARGE  Follow up with LONNIE Patel in 4-6 weeks for recheck for esophageal EGD dilation on 10/24/22.    DISCHARGE DIAGNOSES  Principal Problem:    Esophageal stricture POA: Yes  Active Problems:    Crohn's disease of small intestine with complication (HCC) POA: Yes      Overview: Followed by GI Dr. Cruz      S/p ileostomy      Scope 5/2014-no strictures, fistulas, or new abscesses    Opioid type dependence, continuous (CMS-HCC) POA:  Yes    GERD (gastroesophageal reflux disease) POA: Yes    Ileostomy in place (HCC) POA: Yes    Oropharyngeal dysphagia POA: Yes    Positive VAHID (1:640 homogenous pattern with negative reflex) POA: Yes    Fibromyalgia syndrome POA: Yes    Dysphagia POA: Yes  Resolved Problems:    * No resolved hospital problems. *      FOLLOW UP  Future Appointments   Date Time Provider Department Center   11/9/2022  2:40 PM Man Tinoco M.D. [facility] None   12/6/2022  4:00 PM KEREN Bach [facility] None   1/10/2023  2:30 PM Petar Lewis M.D. [facility] None     Jamarcus Davalos M.D.  [address] [facility] [address] [place] [id number]  [phone]    Schedule an appointment as soon as possible for a visit in 4 week(s)      MEDICATIONS ON DISCHARGE     Medication List        START taking these medications        Instructions   omeprazole 20 MG tablet  Commonly known as: PriLOSEC OTC   Take 1 Tablet by mouth every day.  Dose: 20 mg            CONTINUE taking these medications        Instructions   Azelaic Acid 15 % Gel   Apply 1 Application topically every day. Apply to face.  Dose: 1 Application     Centrum Silver 50+Women Tabs   Take 2 Tablets by mouth every morning. GUMMIES.  Dose: 2 Tablet     diazePAM 5 MG Tabs  Commonly known as: VALIUM   Take 5 mg by mouth every 6 hours as needed (Muscle Spasms).  Dose: 5 mg     furosemide 20 MG Tabs  Commonly known as: LASIX   Take 1 Tablet by mouth 1 time a day as needed (leg swelling/weight gain).  Dose: 20 mg     granisetron 1 MG Tabs  Commonly known as: KYTRIL   Take 1 Tablet by mouth every 12 hours as needed for Nausea/Vomiting.  Dose: 1 mg     HAIR SKIN & NAILS GUMMIES PO   Take 2 Tablets by mouth every morning.  Dose: 2 Tablet     hydroxychloroquine 200 MG Tabs  Commonly known as: Plaquenil   Take 1.5 Tablets by mouth every day.  Dose: 300 mg     melatonin 5 mg Tabs   Take 5 mg by mouth at bedtime as needed (Sleep).  Dose: 5 mg     metoprolol tartrate 50 MG Tabs  Commonly known as: LOPRESSOR    "Take 50 mg by mouth 2 times a day.  Dose: 50 mg     naratriptan 2.5 MG tablet  Commonly known as: AMERGE   Take 1 Tablet by mouth as needed for Migraine.  Dose: 2.5 mg     ondansetron 4 MG Tbdp  Commonly known as: Zofran ODT   Take 1 Tablet by mouth every 6 hours as needed for Nausea.  Dose: 4 mg     oxyCODONE immediate release 10 MG immediate release tablet  Commonly known as: ROXICODONE   Take 10 mg by mouth every 6 hours as needed for Severe Pain.  Dose: 10 mg     potassium chloride SA 20 MEQ Tbcr  Commonly known as: Kdur   Take 1 Tablet by mouth 1 time a day as needed (when you take lasix.).  Dose: 20 mEq     PROBIOTIC PO   Take 2 Capsules by mouth every day.  Dose: 2 Capsule     prochlorperazine 10 MG Tabs  Commonly known as: COMPAZINE   Take 1 Tablet by mouth 1 time a day as needed for Nausea/Vomiting.  Dose: 10 mg     spironolactone 25 MG Tabs  Commonly known as: ALDACTONE   Take 50 mg by mouth every day. 2 tablets = 50 mg.  Dose: 50 mg     traZODone 50 MG Tabs  Commonly known as: DESYREL   Take 50 mg by mouth every evening.  Dose: 50 mg     VITAMIN C PO   Take 1 Tablet by mouth 2 times a day.  Dose: 1 Tablet     ZINC PICOLINATE PO   Take 1 Tablet by mouth 2 times a day.  Dose: 1 Tablet              Allergies  Allergies   Allergen Reactions    Demerol Hcl [Meperidine] Shortness of Breath and Palpitations    Methotrexate Hives, Shortness of Breath and Rash          Morphine Shortness of Breath and Palpitations    Promethazine Shortness of Breath and Rash          Remicade [Infliximab] Hives, Shortness of Breath and Rash          Sudafed [Pseudoephedrine] Shortness of Breath, Vomiting and Palpitations    Azathioprine Sodium Hives and Shortness of Breath     10/21/2022 - patient unable to verify allergy/reaction  Historical reaction listed: Hives, Shortness of Breath    Carafate [Sucralfate] Vomiting and Nausea     + Mouth Sores    Other Drug Unspecified     \"STEROIDS\" - REACTION NOT SPECIFIED    Sulfa Drugs " "Rash          Sulfasalazine Rash          Amitriptyline Unspecified     Nightmares      Ativan [Lorazepam] Unspecified     Nightmares    Betamethasone Unspecified     10/21/2022 - patient unable to verify allergy/reaction  No historical reaction listed    Fentanyl Unspecified     \"Patches didn't work\" and skin broke down    Lyrica [Pregabalin] Nausea          Methadone Unspecified     \"Didn't work\"    Potassium Unspecified     Notes: In IV only  REACTION NOT SPECIFIED    Tizanidine Unspecified     10/21/2022 - patient unable to verify allergy/reaction  No historical reaction listed    Toradol [Ketorolac Tromethamine] Unspecified     10/21/2022 - patient unable to verify allergy/reaction, states she was told by her doctor not to take       DIET  Orders Placed This Encounter   Procedures    Diet Order Diet: Clear Liquid     Standing Status:   Standing     Number of Occurrences:   1     Order Specific Question:   Diet:     Answer:   Clear Liquid [10]       ACTIVITY  As tolerated.  Weight bearing as tolerated    CONSULTATIONS  GI DR. Davalos    PROCEDURES  Procedures   MA UP GI ENDOSCOPY,BALL DIL,30MM [04304 (CPT®)]                                                                      OPERATIVE REPORT     PATIENT:   Jana Overton   1955         PREOPERATIVE DIAGNOSES/INDICATIONS: Esophageal stricture     POSTOPERATIVE DIAGNOSIS: stricture s/p dilatation.      PROCEDURE:  ESOPHAGOGASTRODUODENOSCOPY     PHYSICIAN:  Jamarcus Davalos MD. MPH.     ANESTHESIA:  Per anesthesiologist.     LOCATION: [facility]     CONSENT:  OBTAINED. The risks, benefits and alternatives of the procedure were discussed in details. The risks include and are not limited to bleeding, infection, perforation, missed lesions, and sedations risks (cardiopulmonary compromise and allergic reaction to medications).     DESCRIPTION: The patient presented to the procedure room.  After routine checkup was performed, patient was brought into the endoscopy suite.  " Patient was placed on his left lateral decubitus position. A bite block was placed in patient's mouth. Patient was sedated by anesthesia.  Vital signs were monitored throughout the procedure.  Oxygenation support was provided throughout the procedure. Upper endoscope was inserted into patient's mouth and advanced to the second portion of the duodenum under direct visualization.       Once the site was reached and examined, the upper endoscope was withdrawn.  Retroflexion was made within the stomach.  The stomach was decompressed, scope was withdrawn and the procedure was terminated.     The patient tolerated the procedure well.  There were no immediate complications.     OPERATIVE FINDINGS:     1. Esophagus: Tortuous esophagus. Z line at 36cm. GERD grade A. Moderate stenosis at GE junction. S/p Balloon dilatation upto 11mm with some resistance and mucosal damage. 10mm balloon sweep slowly upward, no 2nd stenosis is identified.   2. Stomach: Grossly normal.   3. Duodenum: Grossly normal.      IMPRESSION/RECOMMENDATIONS:  Most likely peptic stricture at the GE junction. She had multiple biopsy in the past, not repeated today.   The patient could resume diet, starting from clear liquid. Have a speech team to see her before discharge.   She needs to go back to her outpatient GI team ([facility]?) and consider repeat dilatation in 4-6 weeks.      Keep 20mg ppi per day for now.   GI signs off.              LABORATORY  Lab Results   Component Value Date    SODIUM 140 10/24/2022    POTASSIUM 3.3 (L) 10/24/2022    CHLORIDE 107 10/24/2022    CO2 23 10/24/2022    GLUCOSE 85 10/24/2022    BUN 6 (L) 10/24/2022    CREATININE 0.69 10/24/2022    CREATININE 0.89 02/10/2010    GLOMRATE >59 02/10/2010        Lab Results   Component Value Date    WBC 5.6 10/24/2022    WBC 7.9 02/10/2010    HEMOGLOBIN 12.1 10/24/2022    HEMATOCRIT 36.8 (L) 10/24/2022    PLATELETCT 167 10/24/2022        Total time of the discharge process exceeds 40 minutes.

## 2022-10-24 NOTE — PROGRESS NOTES
Assuming care at this time. Report received from previous RN. Pt sleeping supine, HOB elevated. No s/s of distress breathing is regular and unlabored. Fall precautions in place. Call light within reach. Bed lowest position.

## 2022-10-24 NOTE — DISCHARGE INSTRUCTIONS
Esophageal Stricture    Esophageal stricture is a narrowing (stricture) of the esophagus. The esophagus is the part of the body that moves food and liquid from your mouth to your stomach. The esophagus can become narrow because of disease or damage to the area. This condition can make swallowing difficult, painful, or even impossible. It also makes choking more likely.  What are the causes?  The most common cause of this condition is gastroesophageal reflux disease (GERD). Normally, food travels down the esophagus and stays in the stomach to be digested. In GERD, food and stomach acid move back up into the esophagus. Over time, this causes scar tissue and leads to narrowing.  Other causes of esophageal stricture include:  Scarring from swallowing (ingesting) a harmful substance.  Damage from medical instruments used in the esophagus.  Radiation therapy.  Cancer.  Inflammation of the esophagus.  What increases the risk?  You are more likely to develop an esophageal stricture if you have GERD or esophageal cancer.  What are the signs or symptoms?  Symptoms of this condition include:  Difficulty swallowing.  Pain when swallowing.  Burning pain or discomfort in the throat or chest (heartburn).  Vomiting or spitting up (regurgitating) food or liquids.  Unexplained weight loss.  How is this diagnosed?  This condition may be diagnosed based on:  Your symptoms and a physical exam.  Tests, such as:  Upper endoscopy. Your health care provider will insert a flexible tube with a tiny camera on it (endoscope) into your esophagus to check for a stricture. A tissue sample (biopsy) may also be taken to be examined under a microscope.  Esophageal pH monitoring. This test involves using a tube to collect acid in the esophagus to determine how much stomach acid is entering the esophagus.  Barium swallow test. For this test, you will drink a chalky liquid (barium solution) that coats the lining of the esophagus. Then you will have an  X-ray taken. The barium solution helps to show if there is a stricture.  How is this treated?  Treatment for esophageal stricture depends on what is causing your condition and how severe your condition is. Treatment options include:  Esophageal dilation. In this procedure, a health care provider inserts an endoscope or a tool called a dilator into the esophagus to gently stretch it and make the opening wider.  Stents. In some cases, a health care provider may place a small device (stent) in the esophagus to keep it open.  Acid-blocking medicines. Taking these can help you manage GERD symptoms after an esophageal stricture. Controlling your GERD symptoms or being free of them can prevent the stricture from returning.  Follow these instructions at home:  Eating and drinking         Follow instructions from your health care provider about any diet changes.  Cut your food into small pieces, chew well, and eat slowly  Try to eat soft food that is easier to swallow.  Eat and drink only when you are sitting upright.  Do not drink alcohol. If you need help quitting, ask your health care provider.  Do not eat during the 3 hours before bedtime.  Do not overeat at meals.  Do not eat foods that can make reflux worse. These include:  Fatty foods, such as red meat and processed foods.  Spicy foods.  Soda.  Tomato products.  Chocolate.  General instructions  Take over-the-counter and prescription medicines only as told by your health care provider.  Do not use any products that contain nicotine or tobacco, such as cigarettes and e-cigarettes. If you need help quitting, ask your health care provider.  Lose weight if you are overweight.  Wear loose, comfortable clothing.  When lying in bed, raise (elevate) your head with pillows. This will help to prevent your stomach contents from backing up into your esophagus while you sleep.  Keep all follow-up visits as told by your health care provider. This is important.  Contact a health  care provider if:  You have problems eating or swallowing.  You regurgitate food and liquid.  Your symptoms do not improve with treatment.  Get help right away if:  You can no longer keep down any food, drinks, or your saliva.  Summary  Esophageal stricture is a narrowing of the part of the body that moves food and liquid from your mouth to your stomach (esophagus).  The esophagus can become narrow because of disease or damage to the area. This can make swallowing difficult, painful, or even impossible.  Treatment for esophageal stricture depends on what is causing your condition and how severe your condition is. In some cases, procedures may be done to make the opening of the esophagus wider or to place a stent in the esophagus to keep it open.  Do not drink alcohol, overeat at meals, or eat foods that can make reflux worse.  This information is not intended to replace advice given to you by your health care provider. Make sure you discuss any questions you have with your health care provider.  Document Released: 08/28/2007 Document Revised: 03/15/2019 Document Reviewed: 08/24/2018  [employer] Patient Education © 2020 [employer]

## 2022-10-24 NOTE — PROGRESS NOTES
Pt arrived from OR. S WNL. A/O x4.   Pt cleared by speech. Ok to eat thin liquid, pt prefers thicken food. Cleared for discharge. PIV removed. Pt sent to [facility].

## 2022-10-24 NOTE — ANESTHESIA PROCEDURE NOTES
Airway    Date/Time: 10/24/2022 9:08 AM  Performed by: Darius Bui M.D.  Authorized by: Darius Bui M.D.     Location:  OR  Urgency:  Elective  Indications for Airway Management:  Anesthesia      Spontaneous Ventilation: absent    Sedation Level:  Deep  Preoxygenated: Yes    Patient Position:  Sniffing  Mask Difficulty Assessment:  1 - vent by mask  Final Airway Type:  Endotracheal airway  Final Endotracheal Airway:  ETT  Cuffed: Yes    Technique Used for Successful ETT Placement:  Direct laryngoscopy    Insertion Site:  Oral  Blade Type:  Graham  Laryngoscope Blade/Videolaryngoscope Blade Size:  2  ETT Size (mm):  7.0  Measured from:  Lips  ETT to Lips (cm):  21  Placement Verified by: capnometry    Cormack-Lehane Classification:  Grade I - full view of glottis

## 2022-10-24 NOTE — ANESTHESIA POSTPROCEDURE EVALUATION
Patient: Jana Overton    Procedure Summary     Date: 10/24/22 Room / Location: [facility] ROOM 26 / SURGERY SAME DAY [facility]    Anesthesia Start: 0903 Anesthesia Stop: 0934    Procedures:       GASTROSCOPY (Esophagus)      DILATION, STRICTURE, BILE DUCT (Esophagus) Diagnosis: (Esophageal Stricture)    Surgeons: Jamarcus Davalos M.D. Responsible Provider: Darius Bui M.D.    Anesthesia Type: general ASA Status: 3          Final Anesthesia Type: general  Last vitals  BP   Blood Pressure : (!) 169/97    Temp   36.2 °C (97.1 °F)    Pulse   84   Resp   16    SpO2   95 %      Anesthesia Post Evaluation    Patient location during evaluation: PACU  Patient participation: complete - patient participated  Level of consciousness: awake and alert  Pain score: 2    Airway patency: patent  Anesthetic complications: no  Cardiovascular status: adequate and hemodynamically stable  Respiratory status: acceptable  Hydration status: acceptable    PONV: none          No notable events documented.     Nurse Pain Score: 2 (NPRS)

## 2022-10-25 ENCOUNTER — PATIENT OUTREACH (OUTPATIENT)
Dept: MEDICAL GROUP | Facility: LAB | Age: 67
End: 2022-10-25
Payer: MEDICARE

## 2022-10-25 DIAGNOSIS — R11.14 BILIOUS VOMITING WITH NAUSEA: ICD-10-CM

## 2022-10-25 DIAGNOSIS — K50.019 CROHN'S DISEASE OF SMALL INTESTINE WITH COMPLICATION (HCC): ICD-10-CM

## 2022-10-25 DIAGNOSIS — Z93.2 ILEOSTOMY IN PLACE (HCC): ICD-10-CM

## 2022-10-25 DIAGNOSIS — M32.9 SYSTEMIC LUPUS ERYTHEMATOSUS, UNSPECIFIED SLE TYPE, UNSPECIFIED ORGAN INVOLVEMENT STATUS (HCC): ICD-10-CM

## 2022-10-25 RX ORDER — DIAZEPAM 5 MG/1
TABLET ORAL
Qty: 30 TABLET | Refills: 2 | Status: SHIPPED | OUTPATIENT
Start: 2022-10-25 | End: 2022-10-26

## 2022-10-25 NOTE — PROGRESS NOTES
Subjective:     Jana Overton is a 67 y.o. female who presents for Hospital Follow-up.    POST DISCHARGE CALL:  Discharge Date:10/24/2022   Date of Outreach Call: 10/25/2022  8:57 AM  Now that you're home, how are you doing? Good  Did you receive any new prescriptions? Yes  Were you able to fill those medications? Yes  How did you fill those prescriptions? Pharmacy  If not able to fill prescriptions, why? *    Do you have questions about your medications? No  Do you have a follow-up appointment scheduled?Yes  Any issues or paperwork you wish to discuss with your PCP? *  Does patient qualify for CCM Program? No  If patient qualifies, was CCM Program Introduced? *  If patient does not qualify, comment? *  Number of Attempts: 1  Current or previous attempts completed within two business days of discharge? Yes  Provided education regarding treatment plan, medication, self-management, ADLs? Yes  Has patient completed Advance Directive? If yes, advise them to bring to appointment. No  Care Manager phone number provided? Yes  Is there anything else I can help you with? No  Chief Complaint? sent by MD for severe esophageal stricture  Admitting Dx? esophageal stricture  Discharge Diagnosis? esophageal stricture  Additional Comments? *    HPI:   Recently hospitalized for esophageal stricture, dysphagia    Current medicines (including reconciliation performed today)  Current Outpatient Medications   Medication Sig Dispense Refill    omeprazole (PRILOSEC OTC) 20 MG tablet Take 1 Tablet by mouth every day. 30 Tablet 0    Multiple Vitamins-Minerals (CENTRUM SILVER 50+WOMEN) Tab Take 2 Tablets by mouth every morning. GUMMIES.      ZINC PICOLINATE PO Take 1 Tablet by mouth 2 times a day.      Ascorbic Acid (VITAMIN C PO) Take 1 Tablet by mouth 2 times a day.      Biotin w/ Vitamins C & E (HAIR SKIN & NAILS GUMMIES PO) Take 2 Tablets by mouth every morning.      diazePAM (VALIUM) 5 MG Tab Take 5 mg by mouth every 6 hours as needed  (Muscle Spasms).      metoprolol tartrate (LOPRESSOR) 50 MG Tab Take 50 mg by mouth 2 times a day.      melatonin 5 mg Tab Take 5 mg by mouth at bedtime as needed (Sleep).      oxyCODONE immediate release (ROXICODONE) 10 MG immediate release tablet Take 10 mg by mouth every 6 hours as needed for Severe Pain.      spironolactone (ALDACTONE) 25 MG Tab Take 50 mg by mouth every day. 2 tablets = 50 mg.      prochlorperazine (COMPAZINE) 10 MG Tab Take 1 Tablet by mouth 1 time a day as needed for Nausea/Vomiting. 20 Tablet 0    Azelaic Acid 15 % Gel Apply 1 Application topically every day. Apply to face.      granisetron (KYTRIL) 1 MG Tab Take 1 Tablet by mouth every 12 hours as needed for Nausea/Vomiting. 10 Tablet 0    hydroxychloroquine (PLAQUENIL) 200 MG Tab Take 1.5 Tablets by mouth every day. 135 Tablet 3    traZODone (DESYREL) 50 MG Tab Take 50 mg by mouth every evening.      naratriptan (AMERGE) 2.5 MG tablet Take 1 Tablet by mouth as needed for Migraine. 5 Tablet 3    furosemide (LASIX) 20 MG Tab Take 1 Tablet by mouth 1 time a day as needed (leg swelling/weight gain). 90 Tablet 3    potassium chloride SA (KDUR) 20 MEQ Tab CR Take 1 Tablet by mouth 1 time a day as needed (when you take lasix.). 90 Tablet 3    ondansetron (ZOFRAN ODT) 4 MG TABLET DISPERSIBLE Take 1 Tablet by mouth every 6 hours as needed for Nausea. 20 Tablet 0    Probiotic Product (PROBIOTIC PO) Take 2 Capsules by mouth every day.       No current facility-administered medications for this visit.       Allergies:   Demerol hcl [meperidine], Methotrexate, Morphine, Promethazine, Remicade [infliximab], Sudafed [pseudoephedrine], Azathioprine sodium, Carafate [sucralfate], Other drug, Sulfa drugs, Sulfasalazine, Amitriptyline, Ativan [lorazepam], Betamethasone, Fentanyl, Lyrica [pregabalin], Methadone, Potassium, Tizanidine, and Toradol [ketorolac tromethamine]    Social History     Tobacco Use    Smoking status: Never    Smokeless tobacco: Never     Tobacco comments:     second hand smoke parents - smoked for only 1 year many years ago   Vaping Use    Vaping Use: Never used   Substance Use Topics    Alcohol use: Not Currently     Alcohol/week: 0.6 oz     Types: 1 Shots of liquor per week     Comment: gin and half a lime; tonic water    Drug use: Yes     Types: Marijuana, Inhaled     Comment: medical marijuana through bong/vape       ROS:  Currently no nausea vomiting fever chills chest pain tightness    Objective:     There were no vitals filed for this visit.  There is no height or weight on file to calculate BMI.    Physical Exam:  Heart rate rhythm is regular, lungs clear to auscultation    Assessment and Plan:   1. Bilious vomiting with nausea  Continue Valium, patient's esophagus has been dilated  - diazePAM (VALIUM) 5 MG Tab; TAKE 1 TABLET BY MOUTH EVERY 6 HOURS AS NEEDED FOR MUSCLE SPASM FOR 30 DAYS.  Indications: Muscle Spasm  Dispense: 60 Tablet; Refill: 2    2. Systemic lupus erythematosus, unspecified SLE type, unspecified organ involvement status (HCC)  No change in medical therapy  - diazePAM (VALIUM) 5 MG Tab; TAKE 1 TABLET BY MOUTH EVERY 6 HOURS AS NEEDED FOR MUSCLE SPASM FOR 30 DAYS.  Indications: Muscle Spasm  Dispense: 60 Tablet; Refill: 2    3. Crohn's disease of small intestine with complication (HCC)  Ongoing, stable  - diazePAM (VALIUM) 5 MG Tab; TAKE 1 TABLET BY MOUTH EVERY 6 HOURS AS NEEDED FOR MUSCLE SPASM FOR 30 DAYS.  Indications: Muscle Spasm  Dispense: 60 Tablet; Refill: 2    4. Ileostomy in place (HCC)  Continued ileostomy  - diazePAM (VALIUM) 5 MG Tab; TAKE 1 TABLET BY MOUTH EVERY 6 HOURS AS NEEDED FOR MUSCLE SPASM FOR 30 DAYS.  Indications: Muscle Spasm  Dispense: 60 Tablet; Refill: 2    5. Muscle spasm  Continue Valium  - diazePAM (VALIUM) 5 MG Tab; TAKE 1 TABLET BY MOUTH EVERY 6 HOURS AS NEEDED FOR MUSCLE SPASM FOR 30 DAYS.  Indications: Muscle Spasm  Dispense: 60 Tablet; Refill: 2      - Chart and discharge summary were  reviewed.   - Hospitalization and results reviewed with patient.   - Medications reviewed including instructions regarding high risk medications, dosing and side effects.  - Recommended Services: No services needed at this time  - Advance directive/POLST on file?  No     Follow-up:No follow-ups on file.    Face-to-face transitional care management services with MODERATE (today's visit is within 14 days post discharge & LACE+ score of 28-58) medical decision complexity were provided.     LACE+ Historical Score Over Time (0-28: Low, 29-58: Medium, 59+: High): 80      Emergency

## 2022-10-25 NOTE — TELEPHONE ENCOUNTER
Received request via: Patient    Was the patient seen in the last year in this department? Yes  10/21/22  Does the patient have an active prescription (recently filled or refills available) for medication(s) requested? No

## 2022-10-26 ENCOUNTER — OFFICE VISIT (OUTPATIENT)
Dept: MEDICAL GROUP | Facility: LAB | Age: 67
End: 2022-10-26
Payer: MEDICARE

## 2022-10-26 VITALS
OXYGEN SATURATION: 94 % | TEMPERATURE: 97.7 F | HEART RATE: 83 BPM | HEIGHT: 72 IN | BODY MASS INDEX: 20.86 KG/M2 | RESPIRATION RATE: 16 BRPM | WEIGHT: 154 LBS | SYSTOLIC BLOOD PRESSURE: 130 MMHG | DIASTOLIC BLOOD PRESSURE: 64 MMHG

## 2022-10-26 DIAGNOSIS — M32.9 SYSTEMIC LUPUS ERYTHEMATOSUS, UNSPECIFIED SLE TYPE, UNSPECIFIED ORGAN INVOLVEMENT STATUS (HCC): ICD-10-CM

## 2022-10-26 DIAGNOSIS — R11.14 BILIOUS VOMITING WITH NAUSEA: ICD-10-CM

## 2022-10-26 DIAGNOSIS — M62.838 MUSCLE SPASM: ICD-10-CM

## 2022-10-26 DIAGNOSIS — Z93.2 ILEOSTOMY IN PLACE (HCC): ICD-10-CM

## 2022-10-26 DIAGNOSIS — K50.019 CROHN'S DISEASE OF SMALL INTESTINE WITH COMPLICATION (HCC): ICD-10-CM

## 2022-10-26 PROCEDURE — 99496 TRANSJ CARE MGMT HIGH F2F 7D: CPT | Performed by: INTERNAL MEDICINE

## 2022-10-26 RX ORDER — DIAZEPAM 5 MG/1
TABLET ORAL
Qty: 60 TABLET | Refills: 2 | Status: SHIPPED | OUTPATIENT
Start: 2022-10-26 | End: 2022-11-25

## 2022-10-26 ASSESSMENT — FIBROSIS 4 INDEX: FIB4 SCORE: 2.43

## 2022-11-07 DIAGNOSIS — K50.019 CROHN'S DISEASE OF SMALL INTESTINE WITH COMPLICATION (HCC): ICD-10-CM

## 2022-11-07 RX ORDER — TRAZODONE HYDROCHLORIDE 50 MG/1
TABLET ORAL
Qty: 30 TABLET | Refills: 3 | Status: SHIPPED | OUTPATIENT
Start: 2022-11-07 | End: 2023-04-06 | Stop reason: SDUPTHER

## 2022-11-07 RX ORDER — PROCHLORPERAZINE MALEATE 10 MG
TABLET ORAL
Qty: 20 TABLET | Refills: 0 | Status: SHIPPED | OUTPATIENT
Start: 2022-11-07 | End: 2023-01-24 | Stop reason: SDUPTHER

## 2022-11-07 NOTE — TELEPHONE ENCOUNTER
Received request via: Pharmacy    Was the patient seen in the last year in this department? Yes  10/26/22  Does the patient have an active prescription (recently filled or refills available) for medication(s) requested? No

## 2022-11-08 DIAGNOSIS — R11.2 NAUSEA AND VOMITING, UNSPECIFIED VOMITING TYPE: ICD-10-CM

## 2022-11-08 RX ORDER — ONDANSETRON 4 MG/1
4 TABLET, ORALLY DISINTEGRATING ORAL EVERY 6 HOURS PRN
Qty: 20 TABLET | Refills: 0 | Status: SHIPPED | OUTPATIENT
Start: 2022-11-08 | End: 2022-12-05 | Stop reason: SDUPTHER

## 2022-11-09 ENCOUNTER — OFFICE VISIT (OUTPATIENT)
Dept: SLEEP MEDICINE | Facility: MEDICAL CENTER | Age: 67
End: 2022-11-09
Payer: MEDICARE

## 2022-11-09 ENCOUNTER — PATIENT MESSAGE (OUTPATIENT)
Dept: HEALTH INFORMATION MANAGEMENT | Facility: OTHER | Age: 67
End: 2022-11-09

## 2022-11-09 VITALS
WEIGHT: 159 LBS | SYSTOLIC BLOOD PRESSURE: 114 MMHG | OXYGEN SATURATION: 94 % | HEIGHT: 72 IN | BODY MASS INDEX: 21.54 KG/M2 | DIASTOLIC BLOOD PRESSURE: 72 MMHG | RESPIRATION RATE: 16 BRPM | HEART RATE: 79 BPM

## 2022-11-09 DIAGNOSIS — R13.10 DYSPHAGIA, UNSPECIFIED TYPE: ICD-10-CM

## 2022-11-09 DIAGNOSIS — J45.40 MODERATE PERSISTENT ASTHMA WITHOUT COMPLICATION: ICD-10-CM

## 2022-11-09 DIAGNOSIS — K50.019 CROHN'S DISEASE OF SMALL INTESTINE WITH COMPLICATION (HCC): ICD-10-CM

## 2022-11-09 DIAGNOSIS — I48.0 PAROXYSMAL ATRIAL FIBRILLATION (HCC): ICD-10-CM

## 2022-11-09 PROCEDURE — 99214 OFFICE O/P EST MOD 30 MIN: CPT | Performed by: INTERNAL MEDICINE

## 2022-11-09 RX ORDER — LEVALBUTEROL TARTRATE 45 UG/1
2 AEROSOL, METERED ORAL EVERY 4 HOURS PRN
Qty: 1 EACH | Refills: 11 | Status: SHIPPED | OUTPATIENT
Start: 2022-11-09

## 2022-11-09 ASSESSMENT — FIBROSIS 4 INDEX: FIB4 SCORE: 2.43

## 2022-11-10 ENCOUNTER — TELEPHONE (OUTPATIENT)
Dept: MEDICAL GROUP | Facility: LAB | Age: 67
End: 2022-11-10

## 2022-11-10 NOTE — TELEPHONE ENCOUNTER
DOCUMENTATION OF PAR STATUS:    1. Name of Medication & Dose: Ondansetron 4MG dispersible tablets     2. Name of Prescription Coverage Company & phone #: [employer]    3. Date Prior Auth Submitted: 11/10/22    4. What information was given to obtain insurance decision?     5. Prior Auth Status? Pending    6. Patient Notified: no    Key: [id number]

## 2022-11-10 NOTE — PROGRESS NOTES
Chief Complaint   Patient presents with    [facility] Care     last seen on 12/14/2017 - Dr. Tinoco       HPI:  Patient is a 67-year-old woman who we saw several years ago. She does have a history of mild asthma. At the time we saw her she was also noted to have nocturnal oxygen desaturation and eventually had a sleep study showing evidence of complex sleep apnea. The patient had a respiratory disturbance index of 28 events per hour. She had a significant number of central events. Her events were associated with oxygen desaturation. A titration study showed successful titration at 7 cm of water pressure. However, she was never able to tolerate CPAP. A mask that properly fit was never found. She then used supplemental oxygen at night at 3 L/m. Recently she had a cognitive evaluation because of some memory issues. It has been suggested that she restart CPAP.  She does have significant medical problems secondary to her chronic Crohn's disease.  The patient has discontinued nocturnal oxygen and feels as though she is sleeping well.  She really does not want to reinitiate CPAP or nocturnal oxygen.  She does use albuterol as a rescue inhaler but it causes significant tremors so she tries not to use it on a regular basis.  Her asthma is mild and I do not think she needs an ICS.  She has been recently hospitalized because of esophageal stenosis which required dilation.  She will need continued follow-up with GI.  She does have multiple other medical problems as documented in her chart.    Past Medical History:   Diagnosis Date    Anesthesia     difficult Iv stick    Anxiety     Arthritis     all over    ASTHMA     Atrial fib/flut     Backpain     Bronchitis     5 years    Chronic pain     Colostomy in place ([facility]) 10/14/2010    COPD (chronic obstructive pulmonary disease) ([facility]) 6/24/2014    COPD (chronic obstructive pulmonary disease) ([facility]) 6/24/2014    Crohn's disease of colon ([facility])     Dyspnea     History of cardiac murmur      Hypokalemia 6/24/2014    Ileostomy present ([facility])     Infectious disease     MRSA, VancoRSA    Multiple falls 6/24/2014    Narcotic dependence ([facility]) 9/23/2009    Obstruction     Other specified disorder of intestines     crohns disease    Pain 7/11/13    all over    Pneumonia     child and mid 30's    Postherpetic neuralgia 6/24/2014    Rosacea 6/24/2014    Sleep apnea        ROS:   Constitutional: Denies fevers, chills, night sweats, fatigue or weight loss  Eyes: Denies vision loss, pain, drainage, double vision  Ears, Nose, Throat: Denies earache, tinnitus, hoarseness  Cardiovascular: Denies chest pain, tightness, palpitations  Respiratory: See HPI  Sleep: Denies, snoring, apnea  GI: Denies abdominal pain, nausea, vomiting, diarrhea  : Denies frequent urination, hematuria, painful urination  Musculoskeletal: Denies back pain, painful joints, sore muscles  Neurological: Denies headaches, seizures  Skin: Denies rashes, color changes  Psychiatric: Denies depression or thoughts of suicide  Hematologic: Denies bleeding tendency or clotting tendency  Allergic/Immunologic: Denies rhinitis, skin sensitivity    Social History     Socioeconomic History    Marital status:      Spouse name: Not on file    Number of children: Not on file    Years of education: Not on file    Highest education level: Associate degree: academic program   Occupational History    Not on file   Tobacco Use    Smoking status: Never    Smokeless tobacco: Never    Tobacco comments:     second hand smoke parents - smoked for only 1 year many years ago   Vaping Use    Vaping Use: Every day    Substances: THC   Substance and Sexual Activity    Alcohol use: Not Currently     Alcohol/week: 0.6 oz     Types: 1 Shots of liquor per week     Comment: gin and half a lime; tonic water    Drug use: Yes     Types: Marijuana, Inhaled     Comment: medical marijuana through bong/vape    Sexual activity: Not on file     Comment:    Other Topics  Concern    Not on file   Social History Narrative    Not on file     Social Determinants of Health     Financial Resource Strain: Not on file   Food Insecurity: Not on file   Transportation Needs: Not on file   Physical Activity: Not on file   Stress: Not on file   Social Connections: Not on file   Intimate Partner Violence: Not on file   Housing Stability: Not on file     Demerol hcl [meperidine], Methotrexate, Morphine, Promethazine, Remicade [infliximab], Sudafed [pseudoephedrine], Azathioprine sodium, Carafate [sucralfate], Other drug, Sulfa drugs, Sulfasalazine, Amitriptyline, Ativan [lorazepam], Betamethasone, Fentanyl, Lyrica [pregabalin], Methadone, Potassium, Tizanidine, and Toradol [ketorolac tromethamine]  Current Outpatient Medications on File Prior to Visit   Medication Sig Dispense Refill    ondansetron (ZOFRAN ODT) 4 MG TABLET DISPERSIBLE Take 1 Tablet by mouth every 6 hours as needed for Nausea. 20 Tablet 0    prochlorperazine (COMPAZINE) 10 MG Tab TAKE ONE TABLET BY MOUTH ONE TIME DAY AS NEEDED FOR NAUSEA AND VOMITING 20 Tablet 0    traZODone (DESYREL) 50 MG Tab TAKE ONE TABLET BY MOUTH EVERY EVENING 30 Tablet 3    diazePAM (VALIUM) 5 MG Tab TAKE 1 TABLET BY MOUTH EVERY 6 HOURS AS NEEDED FOR MUSCLE SPASM FOR 30 DAYS.  Indications: Muscle Spasm 60 Tablet 2    omeprazole (PRILOSEC OTC) 20 MG tablet Take 1 Tablet by mouth every day. 30 Tablet 0    Multiple Vitamins-Minerals (CENTRUM SILVER 50+WOMEN) Tab Take 2 Tablets by mouth every morning. GUMMIES.      ZINC PICOLINATE PO Take 1 Tablet by mouth 2 times a day.      Ascorbic Acid (VITAMIN C PO) Take 1 Tablet by mouth 2 times a day.      Biotin w/ Vitamins C & E (HAIR SKIN & NAILS GUMMIES PO) Take 2 Tablets by mouth every morning.      metoprolol tartrate (LOPRESSOR) 50 MG Tab Take 1 Tablet by mouth 2 times a day.      melatonin 5 mg Tab Take 1 Tablet by mouth at bedtime as needed (Sleep).      oxyCODONE immediate release (ROXICODONE) 10 MG immediate  release tablet Take 1 Tablet by mouth every 6 hours as needed for Severe Pain.      spironolactone (ALDACTONE) 25 MG Tab Take 2 Tablets by mouth every day. 2 tablets = 50 mg.      Azelaic Acid 15 % Gel Apply 1 Application topically every day. Apply to face.      granisetron (KYTRIL) 1 MG Tab Take 1 Tablet by mouth every 12 hours as needed for Nausea/Vomiting. 10 Tablet 0    hydroxychloroquine (PLAQUENIL) 200 MG Tab Take 1.5 Tablets by mouth every day. 135 Tablet 3    naratriptan (AMERGE) 2.5 MG tablet Take 1 Tablet by mouth as needed for Migraine. 5 Tablet 3    furosemide (LASIX) 20 MG Tab Take 1 Tablet by mouth 1 time a day as needed (leg swelling/weight gain). 90 Tablet 3    potassium chloride SA (KDUR) 20 MEQ Tab CR Take 1 Tablet by mouth 1 time a day as needed (when you take lasix.). 90 Tablet 3    Probiotic Product (PROBIOTIC PO) Take 2 Capsules by mouth every day.       No current facility-administered medications on file prior to visit.     /72 (BP Location: Right arm, Patient Position: Sitting, BP Cuff Size: Adult)   Pulse 79   Resp 16   Ht 1.829 m (6')   Wt 72.1 kg (159 lb)   SpO2 94%   Family History   Problem Relation Age of Onset    Lung Disease Mother         copd    Diabetes Father     Heart Disease Father     Diabetes Sister     Cancer Maternal Aunt 40        breast       Physical Exam:    HEENT: PERRLA, EOMI, no scleral icterus, no nasal or oral lesions  Neck: No thyromegaly, no adenopathy, no bruits  Mallampatti: Grade II  Lungs: Equal breath sounds, no wheezes or crackles  Heart: Regular rate and rhythm, no gallops or murmurs  Abdomen: Chronic discomfort.  Ileostomy  Extremities: No clubbing, cyanosis, or edema  Neurologic: Cranial nerve, motor, and sensory exam are normal    1. Moderate persistent asthma without complication    2. Crohn's disease of small intestine with complication (HCC)    3. Paroxysmal atrial fibrillation (HCC)    4. Dysphagia, unspecified type        She does have  chronic asthma which has been reasonably controlled with albuterol.  However, the albuterol causes side effects.  She does have atrial fibrillation.  She becomes very tremulous after using albuterol.  We will switch her to Xopenex.  We will see her back in 1 year for repeat evaluation.  We will see her sooner if there are issues.

## 2022-11-18 RX ORDER — METOPROLOL TARTRATE 50 MG/1
50 TABLET, FILM COATED ORAL 2 TIMES DAILY
Qty: 180 TABLET | Refills: 2 | Status: SHIPPED | OUTPATIENT
Start: 2022-11-18 | End: 2024-01-30 | Stop reason: SDUPTHER

## 2022-11-29 ENCOUNTER — TELEPHONE (OUTPATIENT)
Dept: SLEEP MEDICINE | Facility: MEDICAL CENTER | Age: 67
End: 2022-11-29

## 2022-11-29 NOTE — TELEPHONE ENCOUNTER
MEDICATION PRIOR AUTHORIZATION NEEDED:    1. Name of Medication: Xopenex HFA 45 mcg    2. Requested By (Name of Pharmacy): [facility]     3. Is insurance on file current? yes    4. What is the name & phone number of the 3rd party payor? [employer]

## 2022-12-02 ENCOUNTER — APPOINTMENT (RX ONLY)
Dept: URBAN - METROPOLITAN AREA CLINIC 36 | Facility: CLINIC | Age: 67
Setting detail: DERMATOLOGY
End: 2022-12-02

## 2022-12-02 DIAGNOSIS — Z41.9 ENCOUNTER FOR PROCEDURE FOR PURPOSES OTHER THAN REMEDYING HEALTH STATE, UNSPECIFIED: ICD-10-CM

## 2022-12-02 PROCEDURE — ? LASER VASCULAR LESION (COSMETIC)

## 2022-12-02 ASSESSMENT — LOCATION SIMPLE DESCRIPTION DERM
LOCATION SIMPLE: RIGHT PRETIBIAL REGION
LOCATION SIMPLE: LEFT PRETIBIAL REGION

## 2022-12-02 ASSESSMENT — LOCATION DETAILED DESCRIPTION DERM
LOCATION DETAILED: RIGHT DISTAL PRETIBIAL REGION
LOCATION DETAILED: LEFT DISTAL PRETIBIAL REGION
LOCATION DETAILED: RIGHT PROXIMAL PRETIBIAL REGION

## 2022-12-02 ASSESSMENT — LOCATION ZONE DERM: LOCATION ZONE: LEG

## 2022-12-02 NOTE — PROCEDURE: LASER VASCULAR LESION (COSMETIC)
Cryogen Time (Ms): 10
Fluence: 8
Spot Size: 7 mm
Cryogen Time (Ms): 30
Add Additional Setting ?: No
Delay Time (Ms): 20
Pulse Duration: 10 ms
Detail Level: Zone
Post-Care Instructions: I reviewed with the patient in detail post-care instructions. Patient should stay away from the sun and wear sun protection until treated areas are fully healed.
Post Procedure Text: Vaseline and ice applied. Post care reviewed with patient.
Fluence: 16
External Cooling Fan Speed: 5
Laser Type: [id number]
Consent: Written consent obtained, risks reviewed including but not limited to crusting, scabbing, blistering, scarring, darker or lighter pigmentary change, incidental hair removal, bruising, and/or incomplete removal.
Price (Use Numbers Only, No Special Characters Or $): 250.00
Spot Size: 3 mm

## 2022-12-05 ENCOUNTER — OFFICE VISIT (OUTPATIENT)
Dept: MEDICAL GROUP | Facility: LAB | Age: 67
End: 2022-12-05
Payer: MEDICARE

## 2022-12-05 VITALS
TEMPERATURE: 96.9 F | OXYGEN SATURATION: 96 % | SYSTOLIC BLOOD PRESSURE: 130 MMHG | HEART RATE: 64 BPM | HEIGHT: 72 IN | BODY MASS INDEX: 20.86 KG/M2 | RESPIRATION RATE: 16 BRPM | DIASTOLIC BLOOD PRESSURE: 70 MMHG | WEIGHT: 154 LBS

## 2022-12-05 DIAGNOSIS — K22.2 GERD WITH STRICTURE: ICD-10-CM

## 2022-12-05 DIAGNOSIS — R11.2 NAUSEA AND VOMITING, UNSPECIFIED VOMITING TYPE: ICD-10-CM

## 2022-12-05 DIAGNOSIS — K21.9 GERD WITH STRICTURE: ICD-10-CM

## 2022-12-05 PROCEDURE — 99213 OFFICE O/P EST LOW 20 MIN: CPT | Performed by: INTERNAL MEDICINE

## 2022-12-05 RX ORDER — ONDANSETRON 4 MG/1
4 TABLET, ORALLY DISINTEGRATING ORAL EVERY 6 HOURS PRN
Qty: 20 TABLET | Refills: 5 | Status: SHIPPED | OUTPATIENT
Start: 2022-12-05 | End: 2023-01-24 | Stop reason: SDUPTHER

## 2022-12-05 ASSESSMENT — FIBROSIS 4 INDEX: FIB4 SCORE: 2.43

## 2022-12-05 NOTE — PROGRESS NOTES
CC: Jana Overton is a 67 y.o. female is suffering from   Chief Complaint   Patient presents with    Follow-Up         SUBJECTIVE:  1. GERD with stricture  Jana is here for follow-up is done well after initial dilatation of her stricture.  However is now having problems with restructuring.    2. Nausea and vomiting, unspecified vomiting type  Patient with nausea vomiting continue Zofran        Past social, family, history: , Goyo  Social History     Tobacco Use    Smoking status: Never    Smokeless tobacco: Never    Tobacco comments:     second hand smoke parents - smoked for only 1 year many years ago   Vaping Use    Vaping Use: Every day    Substances: THC   Substance Use Topics    Alcohol use: Not Currently     Alcohol/week: 0.6 oz     Types: 1 Shots of liquor per week     Comment: gin and half a lime; tonic water    Drug use: Yes     Types: Marijuana, Inhaled     Comment: medical marijuana through bong/vape         MEDICATIONS:    Current Outpatient Medications:     ondansetron (ZOFRAN ODT) 4 MG TABLET DISPERSIBLE, Take 1 Tablet by mouth every 6 hours as needed for Nausea/Vomiting., Disp: 20 Tablet, Rfl: 5    metoprolol tartrate (LOPRESSOR) 50 MG Tab, Take 1 Tablet by mouth 2 times a day., Disp: 180 Tablet, Rfl: 2    levalbuterol (XOPENEX HFA) 45 MCG/ACT inhaler, Inhale 2 Puffs every four hours as needed for Shortness of Breath (As needed for shortness of breath, cough, wheezing.)., Disp: 1 Each, Rfl: 11    prochlorperazine (COMPAZINE) 10 MG Tab, TAKE ONE TABLET BY MOUTH ONE TIME DAY AS NEEDED FOR NAUSEA AND VOMITING, Disp: 20 Tablet, Rfl: 0    traZODone (DESYREL) 50 MG Tab, TAKE ONE TABLET BY MOUTH EVERY EVENING, Disp: 30 Tablet, Rfl: 3    omeprazole (PRILOSEC OTC) 20 MG tablet, Take 1 Tablet by mouth every day., Disp: 30 Tablet, Rfl: 0    Multiple Vitamins-Minerals (CENTRUM SILVER 50+WOMEN) Tab, Take 2 Tablets by mouth every morning. GUMMIES., Disp: , Rfl:     ZINC PICOLINATE PO, Take 1 Tablet by  mouth 2 times a day., Disp: , Rfl:     Ascorbic Acid (VITAMIN C PO), Take 1 Tablet by mouth 2 times a day., Disp: , Rfl:     Biotin w/ Vitamins C & E (HAIR SKIN & NAILS GUMMIES PO), Take 2 Tablets by mouth every morning., Disp: , Rfl:     melatonin 5 mg Tab, Take 1 Tablet by mouth at bedtime as needed (Sleep)., Disp: , Rfl:     oxyCODONE immediate release (ROXICODONE) 10 MG immediate release tablet, Take 1 Tablet by mouth every 6 hours as needed for Severe Pain., Disp: , Rfl:     spironolactone (ALDACTONE) 25 MG Tab, Take 2 Tablets by mouth every day. 2 tablets = 50 mg., Disp: , Rfl:     Azelaic Acid 15 % Gel, Apply 1 Application topically every day. Apply to face., Disp: , Rfl:     granisetron (KYTRIL) 1 MG Tab, Take 1 Tablet by mouth every 12 hours as needed for Nausea/Vomiting., Disp: 10 Tablet, Rfl: 0    hydroxychloroquine (PLAQUENIL) 200 MG Tab, Take 1.5 Tablets by mouth every day., Disp: 135 Tablet, Rfl: 3    naratriptan (AMERGE) 2.5 MG tablet, Take 1 Tablet by mouth as needed for Migraine., Disp: 5 Tablet, Rfl: 3    furosemide (LASIX) 20 MG Tab, Take 1 Tablet by mouth 1 time a day as needed (leg swelling/weight gain)., Disp: 90 Tablet, Rfl: 3    potassium chloride SA (KDUR) 20 MEQ Tab CR, Take 1 Tablet by mouth 1 time a day as needed (when you take lasix.)., Disp: 90 Tablet, Rfl: 3    Probiotic Product (PROBIOTIC PO), Take 2 Capsules by mouth every day., Disp: , Rfl:     PROBLEMS:  Patient Active Problem List    Diagnosis Date Noted    Esophageal stricture 10/22/2022    Dysphagia 10/21/2022    Drug-induced lupus erythematosus 09/27/2022    Long-term use of hydroxychloroquine 09/27/2022    Fibromyalgia syndrome 09/27/2022    Positive cardiolipin antibodies (IgA and IgG anti-CL) 07/27/2022    Positive VAHID (1:640 homogenous pattern with negative reflex) 07/27/2022    Inflammatory arthritis 07/27/2022    Mild tetrahydrocannabinol (THC) abuse 06/02/2022    Oropharyngeal dysphagia 06/02/2022    Acute pancreatitis  05/23/2022    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    Acute encephalopathy 05/21/2022    Acute cystitis without hematuria 12/07/2021    Ureteric stone 12/07/2021    Migraine 06/04/2019    FLORES (acute kidney injury) ([facility]) 06/01/2019    Essential hypertension 05/20/2019    SIRS (systemic inflammatory response syndrome) ([facility]) 05/19/2019    High anion gap metabolic acidosis 05/19/2019    DVT (deep venous thrombosis) ([facility]) 12/31/2018    History of MRSA infection 12/29/2018    History of infection with vancomycin resistant Enterococcus (VRE) 12/29/2018    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[facility]) 12/28/2018    Dysphasia 12/28/2018    Anemia 12/15/2018    Thrush of mouth and esophagus ([facility]) 12/13/2018    Hypokalemia 12/13/2018    Liver enzyme elevation 12/12/2018    Leukocytosis 12/12/2018    Chronic respiratory failure with hypoxia ([facility]) 03/20/2018    Anxiety disorder due to multiple medical problems 12/01/2017    DDD (degenerative disc disease), lumbar 04/12/2017    History of DVT (deep vein thrombosis) 11/23/2016    Paroxysmal atrial fibrillation ([facility]) 03/26/2015    Sleep apnea 10/26/2014    Postherpetic neuralgia 06/24/2014    Multiple falls 06/24/2014    COPD (chronic obstructive pulmonary disease) ([facility]) 06/24/2014    Rosacea 06/24/2014    Ileostomy in place ([facility]) 06/26/2013    GERD (gastroesophageal reflux disease) 12/05/2012    Status post lumbar surgery 07/16/2012    Crohn's disease of small intestine with complication ([facility]) 09/23/2009    Central sensitization to pain 09/23/2009    Opioid type dependence, continuous (CMS-HCC) 09/23/2009    Degenerative joint disease of cervical and lumbar spine 09/23/2009       REVIEW OF SYSTEMS:  Gen.:  No Nausea, Vomiting, fever, Chills.  Heart: No chest pain.  Lungs:  No shortness of Breath.  Psychological: Rg unusual Anxiety depression     PHYSICAL EXAM   Constitutional: Alert, cooperative, not in acute distress.  Cardiovascular:  Rate Rhythm is regular without murmurs  rubs clicks.     Thorax & Lungs: Clear to auscultation, no wheezing, rhonchi, or rales  HENT: Normocephalic, Atraumatic.  Eyes: PERRLA, EOMI, Conjunctiva normal.   Neck: Trachia is midline no swelling of the thyroid.   Lymphatic: No lymphadenopathy noted.   Neurologic: Alert & oriented x 3, cranial nerves II through XII are intact, Normal motor function, Normal sensory function, No focal deficits noted.   Psychiatric: Affect normal, Judgment normal, Mood normal.     VITAL SIGNS:/70   Pulse 64   Temp 36.1 °C (96.9 °F) (Temporal)   Resp 16   Ht 1.829 m (6')   Wt 69.9 kg (154 lb)   SpO2 96%   BMI 20.89 kg/m²     Labs: Reviewed    Assessment:                                                     Plan:    1. GERD with stricture  Continue Zofran, urgent request sent to gastroenterology  - Referral to Gastroenterology  - ondansetron (ZOFRAN ODT) 4 MG TABLET DISPERSIBLE; Take 1 Tablet by mouth every 6 hours as needed for Nausea/Vomiting.  Dispense: 20 Tablet; Refill: 5    2. Nausea and vomiting, unspecified vomiting type  Continue Zofran  - ondansetron (ZOFRAN ODT) 4 MG TABLET DISPERSIBLE; Take 1 Tablet by mouth every 6 hours as needed for Nausea/Vomiting.  Dispense: 20 Tablet; Refill: 5

## 2022-12-06 ENCOUNTER — HOSPITAL ENCOUNTER (OUTPATIENT)
Dept: LAB | Facility: MEDICAL CENTER | Age: 67
End: 2022-12-06
Attending: UROLOGY
Payer: MEDICARE

## 2022-12-06 ENCOUNTER — OFFICE VISIT (OUTPATIENT)
Dept: CARDIOLOGY | Facility: MEDICAL CENTER | Age: 67
End: 2022-12-06
Payer: MEDICARE

## 2022-12-06 VITALS
HEART RATE: 70 BPM | SYSTOLIC BLOOD PRESSURE: 110 MMHG | BODY MASS INDEX: 20.72 KG/M2 | HEIGHT: 72 IN | OXYGEN SATURATION: 96 % | WEIGHT: 153 LBS | RESPIRATION RATE: 16 BRPM | DIASTOLIC BLOOD PRESSURE: 60 MMHG

## 2022-12-06 DIAGNOSIS — I48.0 PAROXYSMAL ATRIAL FIBRILLATION (HCC): ICD-10-CM

## 2022-12-06 DIAGNOSIS — Z86.718 HISTORY OF DVT (DEEP VEIN THROMBOSIS): ICD-10-CM

## 2022-12-06 DIAGNOSIS — I10 ESSENTIAL HYPERTENSION: ICD-10-CM

## 2022-12-06 DIAGNOSIS — K50.019 CROHN'S DISEASE OF SMALL INTESTINE WITH COMPLICATION (HCC): ICD-10-CM

## 2022-12-06 DIAGNOSIS — K22.2 ESOPHAGEAL STRICTURE: ICD-10-CM

## 2022-12-06 DIAGNOSIS — G47.30 SLEEP APNEA, UNSPECIFIED TYPE: ICD-10-CM

## 2022-12-06 LAB
ALBUMIN SERPL BCP-MCNC: 4.5 G/DL (ref 3.2–4.9)
ALBUMIN/GLOB SERPL: 1.2 G/DL
ALP SERPL-CCNC: 113 U/L (ref 30–99)
ALT SERPL-CCNC: 18 U/L (ref 2–50)
ANION GAP SERPL CALC-SCNC: 13 MMOL/L (ref 7–16)
ANION GAP SERPL CALC-SCNC: 14 MMOL/L (ref 7–16)
AST SERPL-CCNC: 26 U/L (ref 12–45)
BILIRUB SERPL-MCNC: 0.3 MG/DL (ref 0.1–1.5)
BUN SERPL-MCNC: 18 MG/DL (ref 8–22)
BUN SERPL-MCNC: 19 MG/DL (ref 8–22)
CALCIUM SERPL-MCNC: 10.1 MG/DL (ref 8.4–10.2)
CALCIUM SERPL-MCNC: 10.2 MG/DL (ref 8.4–10.2)
CHLORIDE SERPL-SCNC: 100 MMOL/L (ref 96–112)
CHLORIDE SERPL-SCNC: 99 MMOL/L (ref 96–112)
CO2 SERPL-SCNC: 24 MMOL/L (ref 20–33)
CO2 SERPL-SCNC: 25 MMOL/L (ref 20–33)
CREAT SERPL-MCNC: 0.92 MG/DL (ref 0.5–1.4)
CREAT SERPL-MCNC: 0.96 MG/DL (ref 0.5–1.4)
GFR SERPLBLD CREATININE-BSD FMLA CKD-EPI: 64 ML/MIN/1.73 M 2
GFR SERPLBLD CREATININE-BSD FMLA CKD-EPI: 67 ML/MIN/1.73 M 2
GLOBULIN SER CALC-MCNC: 3.7 G/DL (ref 1.9–3.5)
GLUCOSE SERPL-MCNC: 91 MG/DL (ref 65–99)
GLUCOSE SERPL-MCNC: 91 MG/DL (ref 65–99)
POTASSIUM SERPL-SCNC: 3.9 MMOL/L (ref 3.6–5.5)
POTASSIUM SERPL-SCNC: 3.9 MMOL/L (ref 3.6–5.5)
PROT SERPL-MCNC: 8.2 G/DL (ref 6–8.2)
PTH-INTACT SERPL-MCNC: 52.3 PG/ML (ref 14–72)
SODIUM SERPL-SCNC: 137 MMOL/L (ref 135–145)
SODIUM SERPL-SCNC: 138 MMOL/L (ref 135–145)
URATE SERPL-MCNC: 7.3 MG/DL (ref 1.9–8.2)

## 2022-12-06 PROCEDURE — 80048 BASIC METABOLIC PNL TOTAL CA: CPT

## 2022-12-06 PROCEDURE — 99214 OFFICE O/P EST MOD 30 MIN: CPT | Performed by: NURSE PRACTITIONER

## 2022-12-06 PROCEDURE — 84550 ASSAY OF BLOOD/URIC ACID: CPT

## 2022-12-06 PROCEDURE — 36415 COLL VENOUS BLD VENIPUNCTURE: CPT

## 2022-12-06 PROCEDURE — 80053 COMPREHEN METABOLIC PANEL: CPT

## 2022-12-06 PROCEDURE — 83970 ASSAY OF PARATHORMONE: CPT

## 2022-12-06 RX ORDER — DIAZEPAM 5 MG/1
5 TABLET ORAL EVERY 6 HOURS PRN
COMMUNITY
End: 2023-09-27 | Stop reason: SDUPTHER

## 2022-12-06 RX ORDER — DIAZEPAM 5 MG/1
5 TABLET ORAL EVERY 6 HOURS PRN
COMMUNITY
Start: 2022-11-30 | End: 2022-12-06

## 2022-12-06 ASSESSMENT — FIBROSIS 4 INDEX: FIB4 SCORE: 2.43

## 2022-12-07 PROBLEM — N30.00 ACUTE CYSTITIS WITHOUT HEMATURIA: Status: RESOLVED | Noted: 2021-12-07 | Resolved: 2022-12-07

## 2022-12-07 PROBLEM — B37.0 THRUSH OF MOUTH AND ESOPHAGUS (HCC): Status: RESOLVED | Noted: 2018-12-13 | Resolved: 2022-12-07

## 2022-12-07 PROBLEM — E87.6 HYPOKALEMIA: Status: RESOLVED | Noted: 2018-12-13 | Resolved: 2022-12-07

## 2022-12-07 PROBLEM — G93.40 ACUTE ENCEPHALOPATHY: Status: RESOLVED | Noted: 2022-05-21 | Resolved: 2022-12-07

## 2022-12-07 PROBLEM — E87.29 HIGH ANION GAP METABOLIC ACIDOSIS: Status: RESOLVED | Noted: 2019-05-19 | Resolved: 2022-12-07

## 2022-12-07 PROBLEM — R74.8 LIVER ENZYME ELEVATION: Status: RESOLVED | Noted: 2018-12-12 | Resolved: 2022-12-07

## 2022-12-07 PROBLEM — R47.02 DYSPHASIA: Status: RESOLVED | Noted: 2018-12-28 | Resolved: 2022-12-07

## 2022-12-07 PROBLEM — B37.81 THRUSH OF MOUTH AND ESOPHAGUS (HCC): Status: RESOLVED | Noted: 2018-12-13 | Resolved: 2022-12-07

## 2022-12-07 PROBLEM — D72.829 LEUKOCYTOSIS: Status: RESOLVED | Noted: 2018-12-12 | Resolved: 2022-12-07

## 2022-12-07 PROBLEM — M53.3 SACROILIAC JOINT PAIN: Status: ACTIVE | Noted: 2022-11-10

## 2022-12-07 PROBLEM — F19.959 STEROID-INDUCED PSYCHOSIS WITH COMPLICATION (HCC): Status: RESOLVED | Noted: 2022-05-21 | Resolved: 2022-12-07

## 2022-12-07 PROBLEM — N20.1 URETERIC STONE: Status: RESOLVED | Noted: 2021-12-07 | Resolved: 2022-12-07

## 2022-12-07 ASSESSMENT — ENCOUNTER SYMPTOMS
NAUSEA: 0
WEIGHT LOSS: 1
PND: 0
MYALGIAS: 0
ORTHOPNEA: 0
CHILLS: 0
FEVER: 0
LOSS OF CONSCIOUSNESS: 0
DIZZINESS: 0
BRUISES/BLEEDS EASILY: 0
HEADACHES: 0
ABDOMINAL PAIN: 0
WEAKNESS: 1
SHORTNESS OF BREATH: 0
INSOMNIA: 0
COUGH: 0
PALPITATIONS: 0

## 2022-12-07 NOTE — PROGRESS NOTES
Chief Complaint   Patient presents with    Follow-Up    HTN (Controlled)    Gastrophageal Reflux     History of esophageal stricture       Subjective     Jana Overton is a 67 y.o. female who presents today for three month follow-up of history of DVT/AFib, TRUDY, COPD and Crohn's disease.    Jana is a 67 year old female with history of DVT/PAFib NOT on anticoagulation, TRUDY, COPD, and Crohn's disease s/p ileostomy, normally followed by Dr. Monahan, who is here for three month follow-up.    In October 2022, she was hospitalized for esophageal dilation, but has only helped temporarily; she needs to have repeat procedure done, as she is not able to keep food down, and appetite is very limited. Caloric intake is decreased.    From a cardiac standpoint, she is stable: no chest pain, pressure or discomfort; no symptomatic palpitations; breathing is stable, no current orthopnea or PND; some mild lightheadedness, due to weight loss, but no syncope or falls; mild LE edema, stable. BP has been stable.    Her biggest issues right now are GI, and she is awaiting follow-up with new gastroenterologist.    Past Medical History:   Diagnosis Date    Anesthesia     Difficult IV stick    Anxiety     Arthritis     all over    ASTHMA     Atrial fib/flut     Backpain     Bronchitis     5 years    Chronic pain     Colostomy in place (HCC)     COPD (chronic obstructive pulmonary disease) (HCC)     Crohn's disease of colon (HCC)     Dyspnea     History of cardiac murmur     Ileostomy present (HCC)     Infectious disease     MRSA, VancoRSA    Multiple falls     Narcotic dependence (HCC)     Obstruction     Pain     Pneumonia     child and mid 30's    Postherpetic neuralgia     Rosacea     Sleep apnea      Past Surgical History:   Procedure Laterality Date    KS UPPER GI ENDOSCOPY,DIAGNOSIS N/A 10/24/2022    Procedure: GASTROSCOPY;  Surgeon: Jamarcus Davalos M.D.;  Location: SURGERY SAME DAY [facility];  Service: Gastroenterology    BILIARY  DILATATION N/A 10/24/2022    Procedure: DILATION, STRICTURE, BILE DUCT;  Surgeon: Jamarcus Davalos M.D.;  Location: SURGERY SAME DAY [facility];  Service: Gastroenterology    DE CYSTOSCOPY,INSERT URETERAL STENT Right 12/23/2021    Procedure: CYSTOSCOPY, WITH URETERAL STENT EXCHANGE;  Surgeon: Jonathan Turcios M.D.;  Location: SURGERY [facility];  Service: Urology    DE CYSTO/URETERO/PYELOSCOPY, DX Right 12/23/2021    Procedure: URETEROSCOPY;  Surgeon: Jonathan Turcios M.D.;  Location: SURGERY [facility];  Service: Urology    DE CYSTO/URETERO/PYELOSCOPY, DX Right 12/8/2021    Procedure: URETEROSCOPY;  Surgeon: Luc Hook M.D.;  Location: SURGERY [facility];  Service: Urology    CYSTO STENT PLACEMNT PRE SURG Right 12/8/2021    Procedure: CYSTOSCOPY, WITH URETERAL STENT INSERTION;  Surgeon: Luc Hook M.D.;  Location: [facility];  Service: Urology    ILEOSTOMY  1/20/2021    Procedure: ILEOSTOMY- COMPLEX REVISION,;  Surgeon: Kevin Cruz M.D.;  Location: [facility];  Service: General    LYSIS ADHESIONS GENERAL  1/20/2021    Procedure: LYSIS, ADHESIONS;  Surgeon: Kevin Cruz M.D.;  Location: [facility];  Service: General    GASTROSCOPY N/A 5/22/2019    Procedure: GASTROSCOPY - WITH DILATION;  Surgeon: Dionicio Cardona M.D.;  Location: [facility];  Service: Gastroenterology    GASTROSCOPY  1/6/2019    Procedure: GASTROSCOPY- WITH DILATION;  Surgeon: Daniel Daniels M.D.;  Location: [facility];  Service: Gastroenterology    ILEOSTOMY  12/29/2018    Procedure: ILEOSTOMY- REVISION;  Surgeon: Kevin Cruz M.D.;  Location: [facility];  Service: General    SIGMOIDOSCOPY FLEX  12/29/2018    Procedure: SIGMOIDOSCOPY FLEX;  Surgeon: Kevin Cruz M.D.;  Location: [facility];  Service: General    EXPLORATORY LAPAROTOMY  12/29/2018    Procedure: EXPLORATORY LAPAROTOMY, lysis of adhesions;  Surgeon: Kevin Cruz M.D.;  Location:  SURGERY [facility];  Service: General    ILEOSTOMY  5/14/2014    Performed by Kevin Cruz M.D. at SURGERY [facility]    MAMMOPLASTY REDUCTION  7/17/2013    Performed by Janey Burt M.D. at SURGERY [facility]    HARDWARE REMOVAL NEURO  7/16/2012    Performed by KEELEY KIM at SURGERY [facility]    GASTROSCOPY  10/4/2011    Performed [facility] TYSON MARTINEZ [facility]    COLONOSCOPY  10/4/2011    Performed by TYSON MARTINEZ at SURGERY SAME DAY [facility]    DILATION AND CURETTAGE  9/24/2010    Performed by GAURAV ALY at SURGERY SAME DAY [facility]    ILEOSTOMY  11/11/2009    Performed by KEVIN CRUZ at SURGERY [facility]    GASTROSCOPY WITH BIOPSY  11/22/08    Performed by NEGRITA HUYNH at SURGERY [facility]    ABDOMINAL EXPLORATION      CERVICAL DISK AND FUSION ANTERIOR      COLON RESECTION      FOOT SURGERY      HAND SURGERY      LUMBAR FUSION ANTERIOR      LUMPECTOMY      OTHER ABDOMINAL SURGERY      surgery for chrons disease    MA BREAST REDUCTION       Family History   Problem Relation Age of Onset    Lung Disease Mother         copd    Diabetes Father     Heart Disease Father     Diabetes Sister     Cancer Maternal Aunt 40        breast     Social History     Socioeconomic History    Marital status:      Spouse name: Not on file    Number of children: Not on file    Years of education: Not on file    Highest education level: Associate degree: academic program   Occupational History    Not on file   Tobacco Use    Smoking status: Never    Smokeless tobacco: Never    Tobacco comments:     second hand smoke parents - smoked for only 1 year many years ago   Vaping Use    Vaping Use: Every day    Substances: THC   Substance and Sexual Activity    Alcohol use: Not Currently     Alcohol/week: 0.6 oz     Types: 1 Shots of liquor per week     Comment: gin and half a lime; tonic water    Drug use: Yes     Types: Marijuana,  "Inhaled     Comment: medical marijuana through bong/vape    Sexual activity: Not on file     Comment:    Other Topics Concern    Not on file   Social History Narrative    Not on file     Social Determinants of Health     Financial Resource Strain: Not on file   Food Insecurity: Not on file   Transportation Needs: Not on file   Physical Activity: Not on file   Stress: Not on file   Social Connections: Not on file   Intimate Partner Violence: Not on file   Housing Stability: Not on file     Allergies   Allergen Reactions    Demerol Hcl [Meperidine] Shortness of Breath and Palpitations    Methotrexate Hives, Shortness of Breath and Rash          Morphine Shortness of Breath and Palpitations    Promethazine Shortness of Breath and Rash          Remicade [Infliximab] Hives, Shortness of Breath and Rash          Sudafed [Pseudoephedrine] Shortness of Breath, Vomiting and Palpitations    Azathioprine Sodium Hives and Shortness of Breath     10/21/2022 - patient unable to verify allergy/reaction  Historical reaction listed: Hives, Shortness of Breath    Carafate [Sucralfate] Vomiting and Nausea     + Mouth Sores    Other Drug Unspecified     \"STEROIDS\" - REACTION NOT SPECIFIED    Sulfa Drugs Rash          Sulfasalazine Rash          Amitriptyline Unspecified     Nightmares      Ativan [Lorazepam] Unspecified     Nightmares    Betamethasone Unspecified     10/21/2022 - patient unable to verify allergy/reaction  No historical reaction listed    Fentanyl Unspecified     \"Patches didn't work\" and skin broke down    Lyrica [Pregabalin] Nausea          Methadone Unspecified     \"Didn't work\"    Potassium Unspecified     Notes: In IV only  REACTION NOT SPECIFIED    Tizanidine Unspecified     10/21/2022 - patient unable to verify allergy/reaction  No historical reaction listed    Toradol [Ketorolac Tromethamine] Unspecified     10/21/2022 - patient unable to verify allergy/reaction, states she was told by her doctor not to " take     Outpatient Encounter Medications as of 12/6/2022   Medication Sig Dispense Refill    diazePAM (VALIUM) 5 MG Tab diazepam 5 mg tablet   TAKE 1 TABLET BY MOUTH EVERY 6 HOURS AS NEEDED FOR MUSCLE SPASM      ondansetron (ZOFRAN ODT) 4 MG TABLET DISPERSIBLE Take 1 Tablet by mouth every 6 hours as needed for Nausea/Vomiting. 20 Tablet 5    metoprolol tartrate (LOPRESSOR) 50 MG Tab Take 1 Tablet by mouth 2 times a day. 180 Tablet 2    levalbuterol (XOPENEX HFA) 45 MCG/ACT inhaler Inhale 2 Puffs every four hours as needed for Shortness of Breath (As needed for shortness of breath, cough, wheezing.). 1 Each 11    prochlorperazine (COMPAZINE) 10 MG Tab TAKE ONE TABLET BY MOUTH ONE TIME DAY AS NEEDED FOR NAUSEA AND VOMITING 20 Tablet 0    traZODone (DESYREL) 50 MG Tab TAKE ONE TABLET BY MOUTH EVERY EVENING 30 Tablet 3    Multiple Vitamins-Minerals (CENTRUM SILVER 50+WOMEN) Tab Take 2 Tablets by mouth every morning. GUMMIES.      ZINC PICOLINATE PO Take 1 Tablet by mouth 2 times a day.      Ascorbic Acid (VITAMIN C PO) Take 1 Tablet by mouth 2 times a day.      Biotin w/ Vitamins C & E (HAIR SKIN & NAILS GUMMIES PO) Take 2 Tablets by mouth every morning.      melatonin 5 mg Tab Take 1 Tablet by mouth at bedtime as needed (Sleep).      spironolactone (ALDACTONE) 25 MG Tab Take 25 mg by mouth every day.      Azelaic Acid 15 % Gel Apply 1 Application topically every day. Apply to face.      granisetron (KYTRIL) 1 MG Tab Take 1 Tablet by mouth every 12 hours as needed for Nausea/Vomiting. 10 Tablet 0    hydroxychloroquine (PLAQUENIL) 200 MG Tab Take 1.5 Tablets by mouth every day. 135 Tablet 3    naratriptan (AMERGE) 2.5 MG tablet Take 1 Tablet by mouth as needed for Migraine. 5 Tablet 3    furosemide (LASIX) 20 MG Tab Take 1 Tablet by mouth 1 time a day as needed (leg swelling/weight gain). 90 Tablet 3    potassium chloride SA (KDUR) 20 MEQ Tab CR Take 1 Tablet by mouth 1 time a day as needed (when you take lasix.). 90  Tablet 3    [DISCONTINUED] diazePAM (VALIUM) 5 MG Tab Take 5 mg by mouth every 6 hours as needed. (Patient not taking: Reported on 12/6/2022)      [DISCONTINUED] omeprazole (PRILOSEC OTC) 20 MG tablet Take 1 Tablet by mouth every day. (Patient not taking: Reported on 12/6/2022) 30 Tablet 0    oxyCODONE immediate release (ROXICODONE) 10 MG immediate release tablet Take 1 Tablet by mouth every 6 hours as needed for Severe Pain.      [DISCONTINUED] Probiotic Product (PROBIOTIC PO) Take 2 Capsules by mouth every day.       No facility-administered encounter medications on file as of 12/6/2022.     Review of Systems   Constitutional:  Positive for malaise/fatigue and weight loss. Negative for chills and fever.   HENT:  Negative for congestion.    Respiratory:  Negative for cough and shortness of breath.    Cardiovascular:  Positive for leg swelling. Negative for chest pain, palpitations, orthopnea and PND.   Gastrointestinal:  Negative for abdominal pain and nausea.        Difficulty swallowing, keeping food down   Musculoskeletal:  Positive for joint pain. Negative for myalgias.   Skin:  Negative for rash.   Neurological:  Positive for weakness. Negative for dizziness, loss of consciousness and headaches.   Endo/Heme/Allergies:  Does not bruise/bleed easily.   Psychiatric/Behavioral:  The patient does not have insomnia.             Objective     /60 (BP Location: Left arm, Patient Position: Sitting, BP Cuff Size: Adult)   Pulse 70   Resp 16   Ht 1.829 m (6')   Wt 69.4 kg (153 lb)   SpO2 96%   BMI 20.75 kg/m²     Physical Exam  Constitutional:       Appearance: She is well-developed.   HENT:      Head: Normocephalic.   Neck:      Vascular: No JVD.   Cardiovascular:      Rate and Rhythm: Normal rate and regular rhythm.      Heart sounds: Normal heart sounds.   Pulmonary:      Effort: Pulmonary effort is normal. No respiratory distress.      Breath sounds: Normal breath sounds. No wheezing or rales.    Abdominal:      General: Bowel sounds are normal. There is no distension.      Palpations: Abdomen is soft.      Tenderness: There is no abdominal tenderness.      Comments: Ileostomy bag in place   Musculoskeletal:         General: Normal range of motion.      Cervical back: Normal range of motion and neck supple.      Right lower leg: Edema present.      Left lower leg: Edema present.      Comments: 1+ edema to the ankles bilaterally, compression stockings on   Skin:     General: Skin is warm and dry.      Findings: No rash.   Neurological:      Mental Status: She is alert and oriented to person, place, and time.   Psychiatric:         Mood and Affect: Mood normal.         Behavior: Behavior normal.     FINDINGS OF CTCS OF 2021:  Coronary calcification:  LMA - 0.0  LCX - 0.0  LAD - 0.0  RCA - 0.0  PDA - 0.0  Total Calcium Score: 0.0    CONCLUSIONS OF ECHOCARDIOGRAM OF 8/3/2021:  Normal left ventricular systolic function.   No evidence of valvular abnormality based on Doppler evaluation.   Compared to the images of the prior study done 17 -  there has   been no significant change.     CONCLUSIONS OF ZIOPATCH OF 2020:  * 13 days and 9 hours of ziopatch recordings reviewed.   * Most symptoms associated with sinus rhythm in the 80s, however one episode was associated with heart rates up to 180s. Read as sinus by computer, but based on tracings I believe this was a supraventricular tachycardia. These were on  at around 2:35PM. Correlate clinically.   * No clear atrial fibrillation.   * Rare PACs/PVCs   * No ventricular tachycardia, automated read incorrectly interpreted artifact.        SUMMARY OF MPI OF 2013:  Lexiscan stress myocardial perfusion stress imaging with technetium   99m tetrofosmin demonstratin.  No infarct or ischemia.  2.  Normal global and hyperdynamic regional function, EF 74%.  3.  No ECG changes or arrhythmia.  4.  No prior for comparison.    Lab Results   Component Value  Date/Time    SODIUM 138 12/06/2022 04:55 PM    POTASSIUM 3.9 12/06/2022 04:55 PM    CHLORIDE 100 12/06/2022 04:55 PM    CO2 24 12/06/2022 04:55 PM    GLUCOSE 91 12/06/2022 04:55 PM    BUN 19 12/06/2022 04:55 PM    CREATININE 0.92 12/06/2022 04:55 PM    CREATININE 0.89 02/10/2010 04:04 PM    BUNCREATRAT 17 02/10/2010 04:04 PM    GLOMRATE >59 02/10/2010 04:04 PM      Lab Results   Component Value Date/Time    WBC 5.6 10/24/2022 01:19 AM    WBC 7.9 02/10/2010 04:04 PM    RBC 4.03 (L) 10/24/2022 01:19 AM    RBC 4.86 02/10/2010 04:04 PM    HEMOGLOBIN 12.1 10/24/2022 01:19 AM    HEMATOCRIT 36.8 (L) 10/24/2022 01:19 AM    MCV 91.3 10/24/2022 01:19 AM    MCV 86 02/10/2010 04:04 PM    MCH 30.0 10/24/2022 01:19 AM    MCH 27.8 02/10/2010 04:04 PM    MCHC 32.9 (L) 10/24/2022 01:19 AM    MPV 10.3 10/24/2022 01:19 AM       Lab Results   Component Value Date/Time    CHOLSTRLTOT 194 05/23/2022 02:36 AM    LDL 90 05/23/2022 02:36 AM    HDL 81 05/23/2022 02:36 AM    TRIGLYCERIDE 113 05/23/2022 02:36 AM               Assessment & Plan     1. Essential hypertension  Basic Metabolic Panel      2. History of DVT (deep vein thrombosis)        3. Paroxysmal atrial fibrillation (HCC)        4. Crohn's disease of small intestine with complication (HCC)        5. Esophageal stricture        6. Sleep apnea, unspecified type            Medical Decision Making: Today's Assessment/Status/Plan:      1. Hypertension, treated with Metoprolol 50mg BID, stable. BP is good today.    2. History of DVT, NOT on anticoagulation. She does have LE edema,and is on Aldactone and Lasix; to check BMP, to assess K+ and renal function. She does have a history of FLORES.    3. PAFib, with short runs of SVT seen on ZioPatch in 2020. She is on Metoprolol, and is NOT anticoagulated, given history of Crohn's. She has not had any symptomatic palpitations.    4. Crohn's disease and esophageal stricture, status post esophageal dilation in October 2022; she needs repeat  procedure, and is awaiting follow-up with GI.    5. TRUDY, uses CPAP.    She remains stable from a cardiac standpoint; she does have follow-up with other providers. We will continue to watch her closely; follow-up with me in 3 months, sooner if clinical condition changes.

## 2022-12-08 ENCOUNTER — HOSPITAL ENCOUNTER (OUTPATIENT)
Facility: MEDICAL CENTER | Age: 67
End: 2022-12-08
Attending: UROLOGY
Payer: MEDICARE

## 2022-12-08 ENCOUNTER — TELEPHONE (OUTPATIENT)
Dept: MEDICAL GROUP | Facility: LAB | Age: 67
End: 2022-12-08
Payer: MEDICARE

## 2022-12-08 PROCEDURE — 82507 ASSAY OF CITRATE: CPT

## 2022-12-08 PROCEDURE — 82570 ASSAY OF URINE CREATININE: CPT

## 2022-12-08 PROCEDURE — 83945 ASSAY OF OXALATE: CPT

## 2022-12-08 PROCEDURE — 82340 ASSAY OF CALCIUM IN URINE: CPT

## 2022-12-08 PROCEDURE — 84560 ASSAY OF URINE/URIC ACID: CPT

## 2022-12-09 ENCOUNTER — PATIENT MESSAGE (OUTPATIENT)
Dept: MEDICAL GROUP | Facility: LAB | Age: 67
End: 2022-12-09
Payer: MEDICARE

## 2022-12-10 LAB
CALCIUM 24H UR-MCNC: 11.3 MG/DL
CALCIUM 24H UR-MRATE: 90 MG/D (ref 100–250)
CITRATE 24H UR-MCNC: 219 MG/L
CITRATE 24H UR-MRATE: 175 MG/D (ref 320–1240)
COLLECT DURATION TIME SPEC: 24 HRS
CREAT 24H UR-MCNC: 123 MG/DL
CREAT 24H UR-MRATE: 984 MG/D (ref 500–1400)
OXALATE 24H UR-MCNC: 24 MG/L
OXALATE 24H UR-MRATE: 19 MG/D (ref 13–40)
TOTAL VOLUME 1105: ABNORMAL ML
URATE 24H UR-MCNC: 58.6 MG/DL
URATE 24H UR-MRATE: 469 MG/D (ref 250–750)

## 2022-12-28 ENCOUNTER — OFFICE VISIT (OUTPATIENT)
Dept: MEDICAL GROUP | Facility: LAB | Age: 67
End: 2022-12-28
Payer: MEDICARE

## 2022-12-28 VITALS
TEMPERATURE: 96.7 F | WEIGHT: 149 LBS | HEIGHT: 72 IN | HEART RATE: 90 BPM | OXYGEN SATURATION: 96 % | SYSTOLIC BLOOD PRESSURE: 122 MMHG | DIASTOLIC BLOOD PRESSURE: 68 MMHG | BODY MASS INDEX: 20.18 KG/M2 | RESPIRATION RATE: 16 BRPM

## 2022-12-28 DIAGNOSIS — B37.9 CANDIDIASIS: ICD-10-CM

## 2022-12-28 DIAGNOSIS — R13.19 ESOPHAGEAL DYSPHAGIA: ICD-10-CM

## 2022-12-28 DIAGNOSIS — K20.90 ESOPHAGITIS: ICD-10-CM

## 2022-12-28 PROCEDURE — 99214 OFFICE O/P EST MOD 30 MIN: CPT | Performed by: INTERNAL MEDICINE

## 2022-12-28 ASSESSMENT — FIBROSIS 4 INDEX: FIB4 SCORE: 2.53

## 2022-12-28 NOTE — PROGRESS NOTES
CC: Jana Overton is a 67 y.o. female is suffering from   Chief Complaint   Patient presents with    Dysphagia         SUBJECTIVE:  1. Esophageal dysphagia  Jana is here for follow-up, continues to struggle with esophageal dysphagia previous esophageal gastroduodenoscopy with dilatation was completed by Dr. Davalos October 21, 2022 patient is in need of reevaluation by gastroenterology    2. Esophagitis  Patient with esophagitis, will have patient start on Magic mouthwash    3. Candidiasis  Patient have oral nystatin to be used        Past social, family, history: , Goyo  Social History     Tobacco Use    Smoking status: Never    Smokeless tobacco: Never    Tobacco comments:     second hand smoke parents - smoked for only 1 year many years ago   Vaping Use    Vaping Use: Every day    Substances: THC   Substance Use Topics    Alcohol use: Not Currently     Alcohol/week: 0.6 oz     Types: 1 Shots of liquor per week     Comment: gin and half a lime; tonic water    Drug use: Yes     Types: Marijuana, Inhaled     Comment: medical marijuana through bong/vape         MEDICATIONS:    Current Outpatient Medications:     Maalox Plus - Diphenhydramine - Lidocaine (MBX Oral Solution), 10 ml po q6 hours prn.  Dysphagia /esophagitis, Disp: 200 mL, Rfl: 3    nystatin (MYCOSTATIN) 875400 UNIT/ML Suspension, Take 5 mL by mouth 4 times a day., Disp: 60 mL, Rfl: 1    diazePAM (VALIUM) 5 MG Tab, diazepam 5 mg tablet  TAKE 1 TABLET BY MOUTH EVERY 6 HOURS AS NEEDED FOR MUSCLE SPASM, Disp: , Rfl:     ondansetron (ZOFRAN ODT) 4 MG TABLET DISPERSIBLE, Take 1 Tablet by mouth every 6 hours as needed for Nausea/Vomiting., Disp: 20 Tablet, Rfl: 5    metoprolol tartrate (LOPRESSOR) 50 MG Tab, Take 1 Tablet by mouth 2 times a day., Disp: 180 Tablet, Rfl: 2    levalbuterol (XOPENEX HFA) 45 MCG/ACT inhaler, Inhale 2 Puffs every four hours as needed for Shortness of Breath (As needed for shortness of breath, cough, wheezing.)., Disp: 1 Each,  Rfl: 11    prochlorperazine (COMPAZINE) 10 MG Tab, TAKE ONE TABLET BY MOUTH ONE TIME DAY AS NEEDED FOR NAUSEA AND VOMITING, Disp: 20 Tablet, Rfl: 0    traZODone (DESYREL) 50 MG Tab, TAKE ONE TABLET BY MOUTH EVERY EVENING, Disp: 30 Tablet, Rfl: 3    Multiple Vitamins-Minerals (CENTRUM SILVER 50+WOMEN) Tab, Take 2 Tablets by mouth every morning. GUMMIES., Disp: , Rfl:     ZINC PICOLINATE PO, Take 1 Tablet by mouth 2 times a day., Disp: , Rfl:     Ascorbic Acid (VITAMIN C PO), Take 1 Tablet by mouth 2 times a day., Disp: , Rfl:     Biotin w/ Vitamins C & E (HAIR SKIN & NAILS GUMMIES PO), Take 2 Tablets by mouth every morning., Disp: , Rfl:     melatonin 5 mg Tab, Take 1 Tablet by mouth at bedtime as needed (Sleep)., Disp: , Rfl:     oxyCODONE immediate release (ROXICODONE) 10 MG immediate release tablet, Take 1 Tablet by mouth every 6 hours as needed for Severe Pain., Disp: , Rfl:     spironolactone (ALDACTONE) 25 MG Tab, Take 25 mg by mouth every day., Disp: , Rfl:     Azelaic Acid 15 % Gel, Apply 1 Application topically every day. Apply to face., Disp: , Rfl:     granisetron (KYTRIL) 1 MG Tab, Take 1 Tablet by mouth every 12 hours as needed for Nausea/Vomiting., Disp: 10 Tablet, Rfl: 0    hydroxychloroquine (PLAQUENIL) 200 MG Tab, Take 1.5 Tablets by mouth every day., Disp: 135 Tablet, Rfl: 3    naratriptan (AMERGE) 2.5 MG tablet, Take 1 Tablet by mouth as needed for Migraine., Disp: 5 Tablet, Rfl: 3    furosemide (LASIX) 20 MG Tab, Take 1 Tablet by mouth 1 time a day as needed (leg swelling/weight gain)., Disp: 90 Tablet, Rfl: 3    potassium chloride SA (KDUR) 20 MEQ Tab CR, Take 1 Tablet by mouth 1 time a day as needed (when you take lasix.)., Disp: 90 Tablet, Rfl: 3    PROBLEMS:  Patient Active Problem List    Diagnosis Date Noted    Sacroiliac joint pain 11/10/2022    Esophageal stricture 10/22/2022    Dysphagia 10/21/2022    Drug-induced lupus erythematosus 09/27/2022    Long-term use of hydroxychloroquine  09/27/2022    Fibromyalgia syndrome 09/27/2022    Positive cardiolipin antibodies (IgA and IgG anti-CL) 07/27/2022    Positive VAHID (1:640 homogenous pattern with negative reflex) 07/27/2022    Inflammatory arthritis 07/27/2022    Mild tetrahydrocannabinol (THC) abuse 06/02/2022    Oropharyngeal dysphagia 06/02/2022    Acute pancreatitis 05/23/2022    Migraine 06/04/2019    FLORES (acute kidney injury) ([facility]) 06/01/2019    Essential hypertension 05/20/2019    SIRS (systemic inflammatory response syndrome) ([facility]) 05/19/2019    History of MRSA infection 12/29/2018    History of infection with vancomycin resistant Enterococcus (VRE) 12/29/2018    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[facility]) 12/28/2018    Anemia 12/15/2018    Chronic respiratory failure with hypoxia ([facility]) 03/20/2018    Anxiety disorder due to multiple medical problems 12/01/2017    DDD (degenerative disc disease), lumbar 04/12/2017    History of DVT (deep vein thrombosis) 11/23/2016    Paroxysmal atrial fibrillation ([facility]) 03/26/2015    Sleep apnea 10/26/2014    Postherpetic neuralgia 06/24/2014    Multiple falls 06/24/2014    COPD (chronic obstructive pulmonary disease) ([facility]) 06/24/2014    Rosacea 06/24/2014    Ileostomy in place ([facility]) 06/26/2013    GERD (gastroesophageal reflux disease) 12/05/2012    Status post lumbar surgery 07/16/2012    Crohn's disease of small intestine with complication ([facility]) 09/23/2009    Central sensitization to pain 09/23/2009    Opioid type dependence, continuous (CMS-HCC) 09/23/2009    Degenerative joint disease of cervical and lumbar spine 09/23/2009       REVIEW OF SYSTEMS:  Gen.:  No Nausea, Vomiting, fever, Chills.  Heart: No chest pain.  Lungs:  No shortness of Breath.  Psychological: Rg unusual Anxiety depression     PHYSICAL EXAM   Constitutional: Alert, cooperative, not in acute distress.  Cardiovascular:  Rate Rhythm is regular without murmurs rubs clicks.     Thorax & Lungs: Clear to auscultation, no wheezing, rhonchi, or rales  HENT:  Normocephalic, Atraumatic.  Eyes: PERRLA, EOMI, Conjunctiva normal.   Neck: Trachia is midline no swelling of the thyroid.   Lymphatic: No lymphadenopathy noted.   Musculoskeletal: Oral thrush  Neurologic: Alert & oriented x 3, cranial nerves II through XII are intact, Normal motor function, Normal sensory function, No focal deficits noted.   Psychiatric: Affect normal, Judgment normal, Mood normal.     VITAL SIGNS:/68   Pulse 90   Temp 35.9 °C (96.7 °F) (Temporal)   Resp 16   Ht 1.829 m (6')   Wt 67.6 kg (149 lb)   SpO2 96%   BMI 20.21 kg/m²     Labs: Reviewed    Assessment:                                                     Plan:    1. Esophageal dysphagia  Esophageal dysphagia patient is to use Magic mouthwash prescription written patient is in need of repeat esophageal gastroduodenoscopy.  - Maalox Plus - Diphenhydramine - Lidocaine (MBX Oral Solution); 10 ml po q6 hours prn.  Dysphagia /esophagitis  Dispense: 200 mL; Refill: 3    2. Esophagitis  History of esophagitis ongoing  - Maalox Plus - Diphenhydramine - Lidocaine (MBX Oral Solution); 10 ml po q6 hours prn.  Dysphagia /esophagitis  Dispense: 200 mL; Refill: 3    3. Candidiasis  Start nystatin suspension 5 mL 4 times a day  - nystatin (MYCOSTATIN) 498829 UNIT/ML Suspension; Take 5 mL by mouth 4 times a day.  Dispense: 60 mL; Refill: 1

## 2022-12-29 ENCOUNTER — TELEPHONE (OUTPATIENT)
Dept: MEDICAL GROUP | Facility: LAB | Age: 67
End: 2022-12-29
Payer: MEDICARE

## 2022-12-29 DIAGNOSIS — M47.816 DEGENERATIVE JOINT DISEASE OF CERVICAL AND LUMBAR SPINE: ICD-10-CM

## 2022-12-29 DIAGNOSIS — M47.812 DEGENERATIVE JOINT DISEASE OF CERVICAL AND LUMBAR SPINE: ICD-10-CM

## 2022-12-29 DIAGNOSIS — Z98.890 STATUS POST LUMBAR SURGERY: ICD-10-CM

## 2022-12-29 DIAGNOSIS — Z93.2 ILEOSTOMY IN PLACE (HCC): ICD-10-CM

## 2022-12-29 RX ORDER — OXYCODONE HYDROCHLORIDE 10 MG/1
10 TABLET ORAL EVERY 6 HOURS PRN
Qty: 28 TABLET | Refills: 0 | Status: SHIPPED | OUTPATIENT
Start: 2022-12-29 | End: 2022-12-29

## 2022-12-29 RX ORDER — OXYCODONE AND ACETAMINOPHEN 10; 325 MG/1; MG/1
1 TABLET ORAL EVERY 6 HOURS PRN
Qty: 120 TABLET | Refills: 0 | Status: SHIPPED | OUTPATIENT
Start: 2022-12-29 | End: 2023-01-04 | Stop reason: SDUPTHER

## 2022-12-29 RX ORDER — POTASSIUM CITRATE AND CITRIC ACID MONOHYDRATE 1100; 334 MG/5ML; MG/5ML
15 SOLUTION ORAL 2 TIMES DAILY
COMMUNITY
End: 2023-01-15

## 2022-12-29 NOTE — TELEPHONE ENCOUNTER
Received request via: Patient    Was the patient seen in the last year in this department? Yes    Does the patient have an active prescription (recently filled or refills available) for medication(s) requested? No    Does the patient have detention Plus and need 100 day supply (blood pressure, diabetes and cholesterol meds only)? Medication is not for cholesterol, blood pressure or diabetes

## 2022-12-29 NOTE — TELEPHONE ENCOUNTER
oxyCODONE immediate release (ROXICODONE) 10 MG immediate release tablet   Is currently unavailable. Please send alternative RX

## 2022-12-30 NOTE — TELEPHONE ENCOUNTER
Afua:  Oxycodone has been changed to Percocet and has been sent to the patient's [facility] pharmacy.   Regards, Chase Charles,

## 2023-01-04 ENCOUNTER — TELEPHONE (OUTPATIENT)
Dept: MEDICAL GROUP | Facility: LAB | Age: 68
End: 2023-01-04
Payer: MEDICARE

## 2023-01-04 DIAGNOSIS — Z93.2 ILEOSTOMY IN PLACE (HCC): ICD-10-CM

## 2023-01-04 DIAGNOSIS — M47.812 DEGENERATIVE JOINT DISEASE OF CERVICAL AND LUMBAR SPINE: ICD-10-CM

## 2023-01-04 DIAGNOSIS — Z98.890 STATUS POST LUMBAR SURGERY: ICD-10-CM

## 2023-01-04 DIAGNOSIS — M47.816 DEGENERATIVE JOINT DISEASE OF CERVICAL AND LUMBAR SPINE: ICD-10-CM

## 2023-01-04 RX ORDER — OXYCODONE AND ACETAMINOPHEN 10; 325 MG/1; MG/1
1 TABLET ORAL EVERY 6 HOURS PRN
Qty: 120 TABLET | Refills: 0 | Status: SHIPPED | OUTPATIENT
Start: 2023-01-04 | End: 2023-01-05 | Stop reason: SDUPTHER

## 2023-01-04 NOTE — TELEPHONE ENCOUNTER
I called pt to check on her.  She is getting worse and is needing to get in with GI asap.  She's seen Dr Jamarcus Davalos in the hospital and said that this is the only Dr that can help her.  How does she get in with this Dr without having to go to the hospital.

## 2023-01-05 ENCOUNTER — PATIENT MESSAGE (OUTPATIENT)
Dept: MEDICAL GROUP | Facility: LAB | Age: 68
End: 2023-01-05
Payer: MEDICARE

## 2023-01-05 DIAGNOSIS — J06.9 UPPER RESPIRATORY TRACT INFECTION, UNSPECIFIED TYPE: ICD-10-CM

## 2023-01-05 DIAGNOSIS — K22.0 ACHALASIA: ICD-10-CM

## 2023-01-05 RX ORDER — OXYCODONE AND ACETAMINOPHEN 10; 325 MG/1; MG/1
1 TABLET ORAL EVERY 6 HOURS PRN
Qty: 120 TABLET | Refills: 0 | Status: SHIPPED | OUTPATIENT
Start: 2023-01-05 | End: 2023-01-06

## 2023-01-05 NOTE — TELEPHONE ENCOUNTER
L/m notifying pt.  The Oxycodone has to go to [facility] on [address].

## 2023-01-05 NOTE — TELEPHONE ENCOUNTER
Pt's pharmacy did not have Oxycodone available when she was here last.  They do have it now.  Can you please call that in again?  She is asking for you to send in 3 months at a time.

## 2023-01-05 NOTE — TELEPHONE ENCOUNTER
High-priority interoffice mail sent to  and to coordinate patient undergoing additional endoscopy.

## 2023-01-06 ENCOUNTER — TELEPHONE (OUTPATIENT)
Dept: MEDICAL GROUP | Facility: LAB | Age: 68
End: 2023-01-06
Payer: MEDICARE

## 2023-01-06 DIAGNOSIS — D64.9 ANEMIA, UNSPECIFIED TYPE: ICD-10-CM

## 2023-01-06 DIAGNOSIS — K22.2 ESOPHAGEAL STRICTURE: ICD-10-CM

## 2023-01-06 DIAGNOSIS — K50.019 CROHN'S DISEASE OF SMALL INTESTINE WITH COMPLICATION (HCC): ICD-10-CM

## 2023-01-06 DIAGNOSIS — Z93.2 ILEOSTOMY IN PLACE (HCC): ICD-10-CM

## 2023-01-06 RX ORDER — OXYCODONE HYDROCHLORIDE 10 MG/1
10 TABLET ORAL EVERY 6 HOURS PRN
Qty: 120 TABLET | Refills: 0 | Status: SHIPPED | OUTPATIENT
Start: 2023-01-06 | End: 2023-01-09 | Stop reason: SDUPTHER

## 2023-01-07 NOTE — TELEPHONE ENCOUNTER
Tami:  A new prescription has been sent to [facility] pharmacy!  Regards, Chase Charles, DO

## 2023-01-09 ENCOUNTER — PATIENT MESSAGE (OUTPATIENT)
Dept: MEDICAL GROUP | Facility: LAB | Age: 68
End: 2023-01-09
Payer: MEDICARE

## 2023-01-09 DIAGNOSIS — D64.9 ANEMIA, UNSPECIFIED TYPE: ICD-10-CM

## 2023-01-09 DIAGNOSIS — K22.2 ESOPHAGEAL STRICTURE: ICD-10-CM

## 2023-01-09 DIAGNOSIS — K50.019 CROHN'S DISEASE OF SMALL INTESTINE WITH COMPLICATION (HCC): ICD-10-CM

## 2023-01-09 DIAGNOSIS — Z93.2 ILEOSTOMY IN PLACE (HCC): ICD-10-CM

## 2023-01-09 RX ORDER — OXYCODONE HYDROCHLORIDE 10 MG/1
10 TABLET ORAL EVERY 6 HOURS PRN
Qty: 120 TABLET | Refills: 0 | Status: ON HOLD | OUTPATIENT
Start: 2023-01-09 | End: 2023-02-08 | Stop reason: SDUPTHER

## 2023-01-09 RX ORDER — OXYCODONE HYDROCHLORIDE 10 MG/1
10 TABLET ORAL EVERY 6 HOURS PRN
Qty: 120 TABLET | Refills: 0 | Status: CANCELLED | OUTPATIENT
Start: 2023-01-09 | End: 2023-02-08

## 2023-01-15 ENCOUNTER — HOSPITAL ENCOUNTER (INPATIENT)
Facility: MEDICAL CENTER | Age: 68
LOS: 1 days | DRG: 392 | End: 2023-01-16
Attending: EMERGENCY MEDICINE | Admitting: STUDENT IN AN ORGANIZED HEALTH CARE EDUCATION/TRAINING PROGRAM
Payer: MEDICARE

## 2023-01-15 ENCOUNTER — APPOINTMENT (OUTPATIENT)
Dept: RADIOLOGY | Facility: MEDICAL CENTER | Age: 68
DRG: 392 | End: 2023-01-15
Attending: EMERGENCY MEDICINE
Payer: MEDICARE

## 2023-01-15 DIAGNOSIS — R10.84 GENERALIZED ABDOMINAL PAIN: ICD-10-CM

## 2023-01-15 DIAGNOSIS — K22.2 ESOPHAGEAL OBSTRUCTION: ICD-10-CM

## 2023-01-15 DIAGNOSIS — R11.11 VOMITING WITHOUT NAUSEA, UNSPECIFIED VOMITING TYPE: ICD-10-CM

## 2023-01-15 LAB
ALBUMIN SERPL BCP-MCNC: 4.6 G/DL (ref 3.2–4.9)
ALBUMIN/GLOB SERPL: 1.2 G/DL
ALP SERPL-CCNC: 148 U/L (ref 30–99)
ALT SERPL-CCNC: 45 U/L (ref 2–50)
ANION GAP SERPL CALC-SCNC: 11 MMOL/L (ref 7–16)
AST SERPL-CCNC: 35 U/L (ref 12–45)
BASOPHILS # BLD AUTO: 0.7 % (ref 0–1.8)
BASOPHILS # BLD: 0.07 K/UL (ref 0–0.12)
BILIRUB SERPL-MCNC: 0.2 MG/DL (ref 0.1–1.5)
BUN SERPL-MCNC: 23 MG/DL (ref 8–22)
CALCIUM ALBUM COR SERPL-MCNC: 10 MG/DL (ref 8.5–10.5)
CALCIUM SERPL-MCNC: 10.5 MG/DL (ref 8.5–10.5)
CHLORIDE SERPL-SCNC: 97 MMOL/L (ref 96–112)
CO2 SERPL-SCNC: 26 MMOL/L (ref 20–33)
CREAT SERPL-MCNC: 1.23 MG/DL (ref 0.5–1.4)
EOSINOPHIL # BLD AUTO: 0.11 K/UL (ref 0–0.51)
EOSINOPHIL NFR BLD: 1.1 % (ref 0–6.9)
ERYTHROCYTE [DISTWIDTH] IN BLOOD BY AUTOMATED COUNT: 45.1 FL (ref 35.9–50)
GFR SERPLBLD CREATININE-BSD FMLA CKD-EPI: 47 ML/MIN/1.73 M 2
GLOBULIN SER CALC-MCNC: 3.8 G/DL (ref 1.9–3.5)
GLUCOSE SERPL-MCNC: 91 MG/DL (ref 65–99)
HCT VFR BLD AUTO: 41.5 % (ref 37–47)
HGB BLD-MCNC: 13.7 G/DL (ref 12–16)
IMM GRANULOCYTES # BLD AUTO: 0.11 K/UL (ref 0–0.11)
IMM GRANULOCYTES NFR BLD AUTO: 1.1 % (ref 0–0.9)
LYMPHOCYTES # BLD AUTO: 1.32 K/UL (ref 1–4.8)
LYMPHOCYTES NFR BLD: 13.8 % (ref 22–41)
MCH RBC QN AUTO: 30.3 PG (ref 27–33)
MCHC RBC AUTO-ENTMCNC: 33 G/DL (ref 33.6–35)
MCV RBC AUTO: 91.8 FL (ref 81.4–97.8)
MONOCYTES # BLD AUTO: 0.69 K/UL (ref 0–0.85)
MONOCYTES NFR BLD AUTO: 7.2 % (ref 0–13.4)
NEUTROPHILS # BLD AUTO: 7.3 K/UL (ref 2–7.15)
NEUTROPHILS NFR BLD: 76.1 % (ref 44–72)
NRBC # BLD AUTO: 0 K/UL
NRBC BLD-RTO: 0 /100 WBC
PLATELET # BLD AUTO: 228 K/UL (ref 164–446)
PMV BLD AUTO: 10.4 FL (ref 9–12.9)
POTASSIUM SERPL-SCNC: 4.9 MMOL/L (ref 3.6–5.5)
PROT SERPL-MCNC: 8.4 G/DL (ref 6–8.2)
RBC # BLD AUTO: 4.52 M/UL (ref 4.2–5.4)
SODIUM SERPL-SCNC: 134 MMOL/L (ref 135–145)
WBC # BLD AUTO: 9.6 K/UL (ref 4.8–10.8)

## 2023-01-15 PROCEDURE — 700111 HCHG RX REV CODE 636 W/ 250 OVERRIDE (IP)

## 2023-01-15 PROCEDURE — 700111 HCHG RX REV CODE 636 W/ 250 OVERRIDE (IP): Performed by: EMERGENCY MEDICINE

## 2023-01-15 PROCEDURE — 99222 1ST HOSP IP/OBS MODERATE 55: CPT | Mod: AI | Performed by: STUDENT IN AN ORGANIZED HEALTH CARE EDUCATION/TRAINING PROGRAM

## 2023-01-15 PROCEDURE — 36415 COLL VENOUS BLD VENIPUNCTURE: CPT

## 2023-01-15 PROCEDURE — 700111 HCHG RX REV CODE 636 W/ 250 OVERRIDE (IP): Performed by: STUDENT IN AN ORGANIZED HEALTH CARE EDUCATION/TRAINING PROGRAM

## 2023-01-15 PROCEDURE — 96376 TX/PRO/DX INJ SAME DRUG ADON: CPT

## 2023-01-15 PROCEDURE — 770006 HCHG ROOM/CARE - MED/SURG/GYN SEMI*

## 2023-01-15 PROCEDURE — 74240 X-RAY XM UPR GI TRC 1CNTRST: CPT

## 2023-01-15 PROCEDURE — 99285 EMERGENCY DEPT VISIT HI MDM: CPT

## 2023-01-15 PROCEDURE — 96375 TX/PRO/DX INJ NEW DRUG ADDON: CPT

## 2023-01-15 PROCEDURE — 80053 COMPREHEN METABOLIC PANEL: CPT

## 2023-01-15 PROCEDURE — 700105 HCHG RX REV CODE 258: Performed by: EMERGENCY MEDICINE

## 2023-01-15 PROCEDURE — 85025 COMPLETE CBC W/AUTO DIFF WBC: CPT

## 2023-01-15 PROCEDURE — 96374 THER/PROPH/DIAG INJ IV PUSH: CPT

## 2023-01-15 PROCEDURE — 700105 HCHG RX REV CODE 258: Performed by: STUDENT IN AN ORGANIZED HEALTH CARE EDUCATION/TRAINING PROGRAM

## 2023-01-15 RX ORDER — OXYCODONE HYDROCHLORIDE 5 MG/1
10 TABLET ORAL
Status: DISCONTINUED | OUTPATIENT
Start: 2023-01-15 | End: 2023-01-15

## 2023-01-15 RX ORDER — ONDANSETRON 2 MG/ML
4 INJECTION INTRAMUSCULAR; INTRAVENOUS EVERY 4 HOURS PRN
Status: DISCONTINUED | OUTPATIENT
Start: 2023-01-15 | End: 2023-01-16 | Stop reason: HOSPADM

## 2023-01-15 RX ORDER — POLYETHYLENE GLYCOL 3350 17 G/17G
1 POWDER, FOR SOLUTION ORAL
Status: DISCONTINUED | OUTPATIENT
Start: 2023-01-15 | End: 2023-01-15

## 2023-01-15 RX ORDER — DIAZEPAM 5 MG/ML
2.5 INJECTION, SOLUTION INTRAMUSCULAR; INTRAVENOUS EVERY 6 HOURS PRN
Status: DISCONTINUED | OUTPATIENT
Start: 2023-01-15 | End: 2023-01-16 | Stop reason: HOSPADM

## 2023-01-15 RX ORDER — HYDROMORPHONE HYDROCHLORIDE 1 MG/ML
1 INJECTION, SOLUTION INTRAMUSCULAR; INTRAVENOUS; SUBCUTANEOUS ONCE
Status: COMPLETED | OUTPATIENT
Start: 2023-01-15 | End: 2023-01-15

## 2023-01-15 RX ORDER — SODIUM CHLORIDE 9 MG/ML
1000 INJECTION, SOLUTION INTRAVENOUS ONCE
Status: COMPLETED | OUTPATIENT
Start: 2023-01-15 | End: 2023-01-15

## 2023-01-15 RX ORDER — AMOXICILLIN 250 MG
2 CAPSULE ORAL 2 TIMES DAILY
Status: DISCONTINUED | OUTPATIENT
Start: 2023-01-15 | End: 2023-01-15

## 2023-01-15 RX ORDER — ACETAMINOPHEN 325 MG/1
650 TABLET ORAL EVERY 6 HOURS PRN
Status: DISCONTINUED | OUTPATIENT
Start: 2023-01-15 | End: 2023-01-16 | Stop reason: HOSPADM

## 2023-01-15 RX ORDER — BISACODYL 10 MG
10 SUPPOSITORY, RECTAL RECTAL
Status: DISCONTINUED | OUTPATIENT
Start: 2023-01-15 | End: 2023-01-15

## 2023-01-15 RX ORDER — OXYCODONE HYDROCHLORIDE 5 MG/1
5 TABLET ORAL
Status: DISCONTINUED | OUTPATIENT
Start: 2023-01-15 | End: 2023-01-15

## 2023-01-15 RX ORDER — HYDROMORPHONE HYDROCHLORIDE 1 MG/ML
0.5 INJECTION, SOLUTION INTRAMUSCULAR; INTRAVENOUS; SUBCUTANEOUS
Status: DISCONTINUED | OUTPATIENT
Start: 2023-01-15 | End: 2023-01-15

## 2023-01-15 RX ORDER — DEXTROSE AND SODIUM CHLORIDE 5; .9 G/100ML; G/100ML
INJECTION, SOLUTION INTRAVENOUS CONTINUOUS
Status: DISCONTINUED | OUTPATIENT
Start: 2023-01-15 | End: 2023-01-16 | Stop reason: HOSPADM

## 2023-01-15 RX ORDER — ONDANSETRON 4 MG/1
4 TABLET, ORALLY DISINTEGRATING ORAL EVERY 4 HOURS PRN
Status: DISCONTINUED | OUTPATIENT
Start: 2023-01-15 | End: 2023-01-16 | Stop reason: HOSPADM

## 2023-01-15 RX ORDER — HYDROMORPHONE HYDROCHLORIDE 1 MG/ML
0.5 INJECTION, SOLUTION INTRAMUSCULAR; INTRAVENOUS; SUBCUTANEOUS EVERY 4 HOURS PRN
Status: DISCONTINUED | OUTPATIENT
Start: 2023-01-15 | End: 2023-01-16 | Stop reason: HOSPADM

## 2023-01-15 RX ORDER — LABETALOL HYDROCHLORIDE 5 MG/ML
10 INJECTION, SOLUTION INTRAVENOUS EVERY 4 HOURS PRN
Status: DISCONTINUED | OUTPATIENT
Start: 2023-01-15 | End: 2023-01-16 | Stop reason: HOSPADM

## 2023-01-15 RX ORDER — ONDANSETRON 2 MG/ML
4 INJECTION INTRAMUSCULAR; INTRAVENOUS ONCE
Status: COMPLETED | OUTPATIENT
Start: 2023-01-15 | End: 2023-01-15

## 2023-01-15 RX ADMIN — HYDROMORPHONE HYDROCHLORIDE 0.5 MG: 1 INJECTION, SOLUTION INTRAMUSCULAR; INTRAVENOUS; SUBCUTANEOUS at 22:05

## 2023-01-15 RX ADMIN — ONDANSETRON 4 MG: 2 INJECTION INTRAMUSCULAR; INTRAVENOUS at 22:27

## 2023-01-15 RX ADMIN — DIAZEPAM 2.5 MG: 5 INJECTION, SOLUTION INTRAMUSCULAR; INTRAVENOUS at 21:31

## 2023-01-15 RX ADMIN — ONDANSETRON 4 MG: 2 INJECTION INTRAMUSCULAR; INTRAVENOUS at 18:30

## 2023-01-15 RX ADMIN — HYDROMORPHONE HYDROCHLORIDE 0.5 MG: 1 INJECTION, SOLUTION INTRAMUSCULAR; INTRAVENOUS; SUBCUTANEOUS at 18:30

## 2023-01-15 RX ADMIN — DEXTROSE AND SODIUM CHLORIDE: 5; 900 INJECTION, SOLUTION INTRAVENOUS at 18:21

## 2023-01-15 RX ADMIN — HYDROMORPHONE HYDROCHLORIDE 1 MG: 1 INJECTION, SOLUTION INTRAMUSCULAR; INTRAVENOUS; SUBCUTANEOUS at 14:43

## 2023-01-15 RX ADMIN — ONDANSETRON HYDROCHLORIDE 4 MG: 2 SOLUTION INTRAMUSCULAR; INTRAVENOUS at 14:43

## 2023-01-15 RX ADMIN — SODIUM CHLORIDE 1000 ML: 9 INJECTION, SOLUTION INTRAVENOUS at 14:51

## 2023-01-15 ASSESSMENT — ENCOUNTER SYMPTOMS
NAUSEA: 1
HEADACHES: 0
VOMITING: 1
EYE PAIN: 0
MYALGIAS: 1
FEVER: 0
WHEEZING: 0
CHILLS: 0
SHORTNESS OF BREATH: 0
ABDOMINAL PAIN: 1
COUGH: 0
DEPRESSION: 0

## 2023-01-15 ASSESSMENT — LIFESTYLE VARIABLES
HAVE PEOPLE ANNOYED YOU BY CRITICIZING YOUR DRINKING: NO
CONSUMPTION TOTAL: NEGATIVE
EVER FELT BAD OR GUILTY ABOUT YOUR DRINKING: NO
DO YOU DRINK ALCOHOL: NO
EVER HAD A DRINK FIRST THING IN THE MORNING TO STEADY YOUR NERVES TO GET RID OF A HANGOVER: NO
HAVE YOU EVER FELT YOU SHOULD CUT DOWN ON YOUR DRINKING: NO
HOW MANY TIMES IN THE PAST YEAR HAVE YOU HAD 5 OR MORE DRINKS IN A DAY: 0
TOTAL SCORE: 0
ON A TYPICAL DAY WHEN YOU DRINK ALCOHOL HOW MANY DRINKS DO YOU HAVE: 0
TOTAL SCORE: 0
AVERAGE NUMBER OF DAYS PER WEEK YOU HAVE A DRINK CONTAINING ALCOHOL: 0
TOTAL SCORE: 0

## 2023-01-15 ASSESSMENT — FIBROSIS 4 INDEX: FIB4 SCORE: 2.53

## 2023-01-15 ASSESSMENT — PAIN DESCRIPTION - PAIN TYPE
TYPE: ACUTE PAIN
TYPE: ACUTE PAIN

## 2023-01-15 NOTE — ED PROVIDER NOTES
ER Provider Note    Scribed for Delon Gillette D.o. by Kingsley Cazares. 1/15/2023  1:14 PM    Primary Care Provider: Chase Charles D.O.    CHIEF COMPLAINT  Chief Complaint   Patient presents with    Weight Loss    Difficulty Swallowing     Pt reports symptoms to have progressed over the last 6-7 weeks. Pt states to have Chron's disease, has a stricture in throat that has forced pt to eat almost nothing, pt states 24lbs weight loss over the past 6 weeks. Pt with obvious dry mucus membranes.      EXTERNAL RECORDS REVIEWED  Inpatient Notes Stricture of the esophagus procedure in October, 2022.       HPI/ROS  LIMITATION TO HISTORY   Select: Patient is abrupt when answering questions, does to appear receptive to questions.  OUTSIDE HISTORIAN(S):  Significant other  at bedside    Jana Overton is a 67 y.o. female who presents to the ED complaining of weight loss onset 6-7 weeks ago. She states that she has lost 24 lbs over the past six weeks. She a history of Chron's disease and has a stricture in her esophagus. She last had her stricture operated on in October, 2022 by Dr. Joseph (GI) to dilate her esophagus. While in the ED she reports associated symptoms of inability to keep even pureed food down, congestion, vomiting, diarrhea, difficulty swallowing secretions, abdominal pain, chest pain, and bilateral foot neuropathy. She denies any fevers or chills, or other recent illness. She is prescribed oxycodone and dilaudid for pain, but notes that she has not been taking them. There are no known alleviating or exacerbating factors. She has a surgical history of several colon surgeries and has an ileostomy bag in place. She also notes that she recently had her potassium medication changed.     ROS as per HPI.     PAST MEDICAL HISTORY  Past Medical History:   Diagnosis Date    Anesthesia     Difficult IV stick    Anxiety     Arthritis     all over    ASTHMA     Atrial fib/flut     Backpain     Bronchitis      5 years    Chronic pain     Colostomy in place ([facility])     COPD (chronic obstructive pulmonary disease) ([facility])     Crohn's disease of colon ([facility])     Dyspnea     History of cardiac murmur     Ileostomy present ([facility])     Infectious disease     MRSA, VancoRSA    Multiple falls     Narcotic dependence ([facility])     Obstruction     Pain     Pneumonia     child and mid 30's    Postherpetic neuralgia     Rosacea     Sleep apnea        SURGICAL HISTORY  Past Surgical History:   Procedure Laterality Date    VA UPPER GI ENDOSCOPY,DIAGNOSIS N/A 10/24/2022    Procedure: GASTROSCOPY;  Surgeon: Jamarcus Davalos M.D.;  Location: SURGERY SAME DAY [facility];  Service: Gastroenterology    BILIARY DILATATION N/A 10/24/2022    Procedure: DILATION, STRICTURE, BILE DUCT;  Surgeon: Jamarcus Davalos M.D.;  Location: SURGERY SAME DAY [facility];  Service: Gastroenterology    VA CYSTOSCOPY,INSERT URETERAL STENT Right 12/23/2021    Procedure: CYSTOSCOPY, WITH URETERAL STENT EXCHANGE;  Surgeon: Jonathan Turcios M.D.;  Location: [facility];  Service: Urology    VA CYSTO/URETERO/PYELOSCOPY, DX Right 12/23/2021    Procedure: URETEROSCOPY;  Surgeon: Jonathan Turcios M.D.;  Location: [facility];  Service: Urology    VA CYSTO/URETERO/PYELOSCOPY, DX Right 12/8/2021    Procedure: URETEROSCOPY;  Surgeon: Luc Hook M.D.;  Location: [facility];  Service: Urology    CYSTO STENT PLACEMNT PRE SURG Right 12/8/2021    Procedure: CYSTOSCOPY, WITH URETERAL STENT INSERTION;  Surgeon: Luc Hook M.D.;  Location: [facility];  Service: Urology    ILEOSTOMY  1/20/2021    Procedure: ILEOSTOMY- COMPLEX REVISION,;  Surgeon: Kevin Cruz M.D.;  Location: [facility];  Service: General    LYSIS ADHESIONS GENERAL  1/20/2021    Procedure: LYSIS, ADHESIONS;  Surgeon: Kevin Cruz M.D.;  Location: [facility];  Service: General    GASTROSCOPY N/A 5/22/2019    Procedure: GASTROSCOPY - WITH DILATION;  Surgeon: Dionicio HERNANDEZ  ROXANNE Cardona;  Location: [facility];  Service: Gastroenterology    GASTROSCOPY  1/6/2019    Procedure: GASTROSCOPY- WITH DILATION;  Surgeon: Daniel Daniels M.D.;  Location: SURGERY [facility];  Service: Gastroenterology    ILEOSTOMY  12/29/2018    Procedure: ILEOSTOMY- REVISION;  Surgeon: Kevin Cruz M.D.;  Location: SURGERY [facility];  Service: General    SIGMOIDOSCOPY FLEX  12/29/2018    Procedure: SIGMOIDOSCOPY FLEX;  Surgeon: Kevin Cruz M.D.;  Location: SURGERY [facility];  Service: General    EXPLORATORY LAPAROTOMY  12/29/2018    Procedure: EXPLORATORY LAPAROTOMY, lysis of adhesions;  Surgeon: Kevin Cruz M.D.;  Location: [facility];  Service: General    ILEOSTOMY  5/14/2014    Performed by Kevin Cruz M.D. at [facility]    MAMMOPLASTY REDUCTION  7/17/2013    Performed by Janey Burt M.D. at [facility] ORS    HARDWARE REMOVAL NEURO  7/16/2012    Performed by KEELEY KIM at [facility]    GASTROSCOPY  10/4/2011    Performed by TYSON MARTINEZ at SURGERY SAME DAY [facility] ORS    COLONOSCOPY  10/4/2011    Performed by TYSON MARTINEZ at SURGERY SAME DAY [facility] ORS    DILATION AND CURETTAGE  9/24/2010    Performed by GAURAV ALY at SURGERY SAME DAY [facility] ORS    ILEOSTOMY  11/11/2009    Performed by KEVIN CRUZ at [facility]    GASTROSCOPY WITH BIOPSY  11/22/08    Performed by NEGRITA HUYNH at [facility]    ABDOMINAL EXPLORATION      CERVICAL DISK AND FUSION ANTERIOR      COLON RESECTION      FOOT SURGERY      HAND SURGERY      LUMBAR FUSION ANTERIOR      LUMPECTOMY      OTHER ABDOMINAL SURGERY      surgery for chrons disease    IL BREAST REDUCTION         FAMILY HISTORY  Family History   Problem Relation Age of Onset    Lung Disease Mother         copd    Diabetes Father     Heart Disease Father     Diabetes Sister     Cancer Maternal Aunt 40        breast        SOCIAL HISTORY   reports that she has never smoked. She has never used smokeless tobacco. She reports that she does not currently use alcohol after a past usage of about 0.6 oz per week. She reports current drug use. Drugs: Marijuana and Inhaled.    CURRENT MEDICATIONS  Current Discharge Medication List        CONTINUE these medications which have NOT CHANGED    Details   POTASSIUM CHLORIDE PO Take 15 mL by mouth 2 (two) times a day.      oxyCODONE immediate release (ROXICODONE) 10 MG immediate release tablet Take 1 Tablet by mouth every 6 hours as needed for Severe Pain for up to 30 days.  Qty: 120 Tablet, Refills: 0    Associated Diagnoses: Crohn's disease of small intestine with complication (HCC); Ileostomy in place (HCC); Esophageal stricture; Anemia, unspecified type      Maalox Plus - Diphenhydramine - Lidocaine (MBX Oral Solution) 10 ml po q6 hours prn.  Dysphagia /esophagitis  Qty: 200 mL, Refills: 3    Comments: Mix Maalox: diphenhydramine: lidocaine 2% in 1:1:1 ratio.  Associated Diagnoses: Esophageal dysphagia; Esophagitis      nystatin (MYCOSTATIN) 028677 UNIT/ML Suspension Take 5 mL by mouth 4 times a day.  Qty: 60 mL, Refills: 1    Associated Diagnoses: Candidiasis      diazePAM (VALIUM) 5 MG Tab diazepam 5 mg tablet   TAKE 1 TABLET BY MOUTH EVERY 6 HOURS AS NEEDED FOR MUSCLE SPASM      ondansetron (ZOFRAN ODT) 4 MG TABLET DISPERSIBLE Take 1 Tablet by mouth every 6 hours as needed for Nausea/Vomiting.  Qty: 20 Tablet, Refills: 5    Associated Diagnoses: GERD with stricture; Nausea and vomiting, unspecified vomiting type      metoprolol tartrate (LOPRESSOR) 50 MG Tab Take 1 Tablet by mouth 2 times a day.  Qty: 180 Tablet, Refills: 2      levalbuterol (XOPENEX HFA) 45 MCG/ACT inhaler Inhale 2 Puffs every four hours as needed for Shortness of Breath (As needed for shortness of breath, cough, wheezing.).  Qty: 1 Each, Refills: 11      prochlorperazine (COMPAZINE) 10 MG Tab TAKE ONE TABLET BY MOUTH  ONE TIME DAY AS NEEDED FOR NAUSEA AND VOMITING  Qty: 20 Tablet, Refills: 0    Associated Diagnoses: Crohn's disease of small intestine with complication (HCC)      traZODone (DESYREL) 50 MG Tab TAKE ONE TABLET BY MOUTH EVERY EVENING  Qty: 30 Tablet, Refills: 3      Multiple Vitamins-Minerals (CENTRUM SILVER 50+WOMEN) Tab Take 2 Tablets by mouth every morning. GUMMIES.      ZINC PICOLINATE PO Take 1 Tablet by mouth 2 times a day.      Ascorbic Acid (VITAMIN C PO) Take 1 Tablet by mouth 2 times a day.      Biotin w/ Vitamins C & E (HAIR SKIN & NAILS GUMMIES PO) Take 2 Tablets by mouth every morning.      spironolactone (ALDACTONE) 25 MG Tab Take 25 mg by mouth every day.      Azelaic Acid 15 % Gel Apply 1 Application topically every day. Apply to face.      granisetron (KYTRIL) 1 MG Tab Take 1 Tablet by mouth every 12 hours as needed for Nausea/Vomiting.  Qty: 10 Tablet, Refills: 0    Associated Diagnoses: Bilious vomiting with nausea      hydroxychloroquine (PLAQUENIL) 200 MG Tab Take 1.5 Tablets by mouth every day.  Qty: 135 Tablet, Refills: 3    Associated Diagnoses: Drug-induced lupus erythematosus      naratriptan (AMERGE) 2.5 MG tablet Take 1 Tablet by mouth as needed for Migraine.  Qty: 5 Tablet, Refills: 3      furosemide (LASIX) 20 MG Tab Take 1 Tablet by mouth 1 time a day as needed (leg swelling/weight gain).  Qty: 90 Tablet, Refills: 3      potassium chloride SA (KDUR) 20 MEQ Tab CR Take 1 Tablet by mouth 1 time a day as needed (when you take lasix.).  Qty: 90 Tablet, Refills: 3             ALLERGIES  Demerol hcl [meperidine], Methotrexate, Morphine, Promethazine, Remicade [infliximab], Sudafed [pseudoephedrine], Azathioprine sodium, Carafate [sucralfate], Other drug, Sulfa drugs, Sulfasalazine, Amitriptyline, Ativan [lorazepam], Betamethasone, Fentanyl, Lyrica [pregabalin], Methadone, Potassium, Tizanidine, and Toradol [ketorolac tromethamine]    PHYSICAL EXAM  BP (!) 165/86   Pulse 83   Temp 36.2 °C  (97.2 °F) (Temporal)   Resp 19   Ht 1.829 m (6')   Wt 67 kg (147 lb 11.3 oz)   SpO2 97%   BMI 20.03 kg/m²   General: No acute distress.  HENT: Normocephalic, Mucus membranes are moist.   Chest: Lungs have even and unlabored respirations, Clear to auscultation.   Cardiovascular: Regular rate and rhythm, No peripheral cyanosis.  Abdomen: Ileostomy bag in place and diffuse generalized abdominal tenderness. Non distended.  Neuro: Awake, Conversive, Able to relay recent events.  Psychiatric: Calm and cooperative.       DIAGNOSTIC STUDIES    Labs:   Results for orders placed or performed during the hospital encounter of 01/15/23   CBC WITH DIFFERENTIAL   Result Value Ref Range    WBC 9.6 4.8 - 10.8 K/uL    RBC 4.52 4.20 - 5.40 M/uL    Hemoglobin 13.7 12.0 - 16.0 g/dL    Hematocrit 41.5 37.0 - 47.0 %    MCV 91.8 81.4 - 97.8 fL    MCH 30.3 27.0 - 33.0 pg    MCHC 33.0 (L) 33.6 - 35.0 g/dL    RDW 45.1 35.9 - 50.0 fL    Platelet Count 228 164 - 446 K/uL    MPV 10.4 9.0 - 12.9 fL    Neutrophils-Polys 76.10 (H) 44.00 - 72.00 %    Lymphocytes 13.80 (L) 22.00 - 41.00 %    Monocytes 7.20 0.00 - 13.40 %    Eosinophils 1.10 0.00 - 6.90 %    Basophils 0.70 0.00 - 1.80 %    Immature Granulocytes 1.10 (H) 0.00 - 0.90 %    Nucleated RBC 0.00 /100 WBC    Neutrophils (Absolute) 7.30 (H) 2.00 - 7.15 K/uL    Lymphs (Absolute) 1.32 1.00 - 4.80 K/uL    Monos (Absolute) 0.69 0.00 - 0.85 K/uL    Eos (Absolute) 0.11 0.00 - 0.51 K/uL    Baso (Absolute) 0.07 0.00 - 0.12 K/uL    Immature Granulocytes (abs) 0.11 0.00 - 0.11 K/uL    NRBC (Absolute) 0.00 K/uL   COMP METABOLIC PANEL   Result Value Ref Range    Sodium 134 (L) 135 - 145 mmol/L    Potassium 4.9 3.6 - 5.5 mmol/L    Chloride 97 96 - 112 mmol/L    Co2 26 20 - 33 mmol/L    Anion Gap 11.0 7.0 - 16.0    Glucose 91 65 - 99 mg/dL    Bun 23 (H) 8 - 22 mg/dL    Creatinine 1.23 0.50 - 1.40 mg/dL    Calcium 10.5 8.5 - 10.5 mg/dL    AST(SGOT) 35 12 - 45 U/L    ALT(SGPT) 45 2 - 50 U/L    Alkaline  Phosphatase 148 (H) 30 - 99 U/L    Total Bilirubin 0.2 0.1 - 1.5 mg/dL    Albumin 4.6 3.2 - 4.9 g/dL    Total Protein 8.4 (H) 6.0 - 8.2 g/dL    Globulin 3.8 (H) 1.9 - 3.5 g/dL    A-G Ratio 1.2 g/dL   ESTIMATED GFR   Result Value Ref Range    GFR (CKD-EPI) 47 (A) >60 mL/min/1.73 m 2   CORRECTED CALCIUM   Result Value Ref Range    Correct Calcium 10.0 8.5 - 10.5 mg/dL     *Note: Due to a large number of results and/or encounters for the requested time period, some results have not been displayed. A complete set of results can be found in Results Review.        COURSE & MEDICAL DECISION MAKING     INITIAL ASSESSMENT AND PLAN  Care Narrative: History of Chron's and esophageal strictures, complaining of vomiting even pureed foods. Appears to be tolerating secretions at this time. Plan will be IV fluids, evaluate chemistries and electrolyte levels, treat for pain and nausea, and call her GI specialist for consultations. Ordered CBC with differential and CMP to evaluate the patient's symptoms. Patient will be treated with NS infusion 1L, Zofran injection 4 mg, and Dilaudid injection 1 mg.     3:21 PM Patient was reevaluated at bedside. Discussed lab results with the patient and informed them that I would be consulting her GI specialist.      3:21 PM Paged GI.    3:33 PM I discussed the patient's case and the above findings with Dr. Mahan (GI) who is familiar with the patient and would like the patient evaluated using upper GI barium swallow to test for stricture.    3:47 PM Ordered DX-Upper GI series with KUB for further evaluation.      3:53 PM Patient was reevaluated at bedside. Patient feels reports improved nausea and pain at this time.     4:50 PM Received a call from radiology informing me that the patient's imaging reveals a severe esophageal stricture.     4:58 PM Paged GI.     5:08 PM I discussed the patient's case and the above findings with Dr. Davalos (GI) who recommends admission with planned procedure  tomorrow.     5:12 PM Paged Hospitalist.    5:13 PM - I reevaluated the patient at bedside.  I informed the patient of my plan to admit today given the patient's current presentation and diagnostic study results. Patient verbalizes understanding and support with my plan for admission.      5:22 PM I discussed the patient's case and the above findings with Dr. Gee (Hospitalist) who agrees to evaluate the patient for hospitalization.          ADDITIONAL PROBLEM LIST AND DISPOSITION  ED Course: Patient has a history of Crohn's, and ileostomy, and esophageal strictures presents with complaints of vomiting and unable to tolerate even puréed foods.  She is handling her secretions degree of obstruction is difficult to ascertain.  Her lab test shows no significant electrolyte abnormality.  She is not anemic and no signs of infection I spoke with the on-call GI specialist who is familiar with this patient and barium swallow was done.    Barium swallow does show significant restriction I spoke with the GI on-call and the patient will be admitted for further evaluation and treatment.    FINAL DISPOSITION:  Patient will be hospitalized by Dr. Gee in guarded condition.    Problem #1: Abdominal Pain  Problem #2: Vomiting  Problem #3: Difficult IV access    I have discussed management of the patient with the following physicians and LOTTIE's:  Dr. Mahan (GI),  Dr. Davalos (GI), and Dr. Gee (Hospitalist).        HYDRATION: Based on the patient's presentation of Dehydration the patient was given IV fluids. IV Hydration was used because oral hydration was not adequate alone. Upon recheck following hydration, the patient was improved.    FINAL DIANGOSIS  1. Esophageal obstruction    2. Generalized abdominal pain    3. Vomiting without nausea, unspecified vomiting type         CRITICAL CARE  The very real possibility of a deterioration of this patient's condition required the highest level of my preparedness for sudden, emergent intervention.   I provided critical care services, which included medication orders, frequent reevaluations of the patient's condition and response to treatment, ordering and reviewing test results, and discussing the case with various consultants.  The critical care time associated with the care of the patient was 30 minutes. Review chart for interventions. This time is exclusive of any other billable procedures.      Kingsley DE LA CRUZ (Gia), am scribing for, and in the presence of, Delon Gillette D.O..    Electronically signed by: Kingsley Cazares (Gia), 1/15/2023    IDelon D.O. personally performed the services described in this documentation, as scribed by Kingsley Cazares in my presence, and it is both accurate and complete.     The note accurately reflects work and decisions made by me.  Delon Gillette D.O.  1/15/2023  7:53 PM

## 2023-01-15 NOTE — ED TRIAGE NOTES
Chief Complaint   Patient presents with    Weight Loss    Difficulty Swallowing     Pt reports symptoms to have progressed over the last 6-7 weeks. Pt states to have Chron's disease, has a stricture in throat that has forced pt to eat almost nothing, pt states 24lbs weight loss over the past 6 weeks. Pt with obvious dry mucus membranes.      Explained to pt triage process, made pt aware to tell this RN/staff of any changes/concerns, pt verbalized understanding of process and instructions given. Pt to ER johnson.

## 2023-01-16 ENCOUNTER — ANESTHESIA (OUTPATIENT)
Dept: SURGERY | Facility: MEDICAL CENTER | Age: 68
DRG: 392 | End: 2023-01-16
Payer: MEDICARE

## 2023-01-16 ENCOUNTER — ANESTHESIA EVENT (OUTPATIENT)
Dept: SURGERY | Facility: MEDICAL CENTER | Age: 68
DRG: 392 | End: 2023-01-16
Payer: MEDICARE

## 2023-01-16 VITALS
BODY MASS INDEX: 20.01 KG/M2 | RESPIRATION RATE: 18 BRPM | HEIGHT: 72 IN | TEMPERATURE: 96.7 F | DIASTOLIC BLOOD PRESSURE: 60 MMHG | SYSTOLIC BLOOD PRESSURE: 130 MMHG | HEART RATE: 104 BPM | OXYGEN SATURATION: 99 % | WEIGHT: 147.71 LBS

## 2023-01-16 PROBLEM — E83.42 HYPOMAGNESEMIA: Status: ACTIVE | Noted: 2023-01-16

## 2023-01-16 LAB
ANION GAP SERPL CALC-SCNC: 8 MMOL/L (ref 7–16)
BUN SERPL-MCNC: 17 MG/DL (ref 8–22)
CALCIUM SERPL-MCNC: 9.3 MG/DL (ref 8.5–10.5)
CHLORIDE SERPL-SCNC: 106 MMOL/L (ref 96–112)
CO2 SERPL-SCNC: 25 MMOL/L (ref 20–33)
CREAT SERPL-MCNC: 0.92 MG/DL (ref 0.5–1.4)
ERYTHROCYTE [DISTWIDTH] IN BLOOD BY AUTOMATED COUNT: 44.7 FL (ref 35.9–50)
GFR SERPLBLD CREATININE-BSD FMLA CKD-EPI: 67 ML/MIN/1.73 M 2
GLUCOSE SERPL-MCNC: 91 MG/DL (ref 65–99)
HCT VFR BLD AUTO: 35.4 % (ref 37–47)
HGB BLD-MCNC: 11.9 G/DL (ref 12–16)
MAGNESIUM SERPL-MCNC: 1.6 MG/DL (ref 1.5–2.5)
MCH RBC QN AUTO: 30.6 PG (ref 27–33)
MCHC RBC AUTO-ENTMCNC: 33.6 G/DL (ref 33.6–35)
MCV RBC AUTO: 91 FL (ref 81.4–97.8)
PATHOLOGY CONSULT NOTE: NORMAL
PHOSPHATE SERPL-MCNC: 2.5 MG/DL (ref 2.5–4.5)
PLATELET # BLD AUTO: 188 K/UL (ref 164–446)
PMV BLD AUTO: 10.4 FL (ref 9–12.9)
POTASSIUM SERPL-SCNC: 4 MMOL/L (ref 3.6–5.5)
RBC # BLD AUTO: 3.89 M/UL (ref 4.2–5.4)
SODIUM SERPL-SCNC: 139 MMOL/L (ref 135–145)
WBC # BLD AUTO: 8.2 K/UL (ref 4.8–10.8)

## 2023-01-16 PROCEDURE — 700102 HCHG RX REV CODE 250 W/ 637 OVERRIDE(OP): Performed by: STUDENT IN AN ORGANIZED HEALTH CARE EDUCATION/TRAINING PROGRAM

## 2023-01-16 PROCEDURE — 700111 HCHG RX REV CODE 636 W/ 250 OVERRIDE (IP): Performed by: STUDENT IN AN ORGANIZED HEALTH CARE EDUCATION/TRAINING PROGRAM

## 2023-01-16 PROCEDURE — 00731 ANES UPR GI NDSC PX NOS: CPT | Performed by: ANESTHESIOLOGY

## 2023-01-16 PROCEDURE — 160009 HCHG ANES TIME/MIN: Performed by: INTERNAL MEDICINE

## 2023-01-16 PROCEDURE — 0DB38ZX EXCISION OF LOWER ESOPHAGUS, VIA NATURAL OR ARTIFICIAL OPENING ENDOSCOPIC, DIAGNOSTIC: ICD-10-PCS | Performed by: INTERNAL MEDICINE

## 2023-01-16 PROCEDURE — 36415 COLL VENOUS BLD VENIPUNCTURE: CPT

## 2023-01-16 PROCEDURE — 160002 HCHG RECOVERY MINUTES (STAT): Performed by: INTERNAL MEDICINE

## 2023-01-16 PROCEDURE — 160203 HCHG ENDO MINUTES - 1ST 30 MINS LEVEL 4: Performed by: INTERNAL MEDICINE

## 2023-01-16 PROCEDURE — A9270 NON-COVERED ITEM OR SERVICE: HCPCS | Performed by: STUDENT IN AN ORGANIZED HEALTH CARE EDUCATION/TRAINING PROGRAM

## 2023-01-16 PROCEDURE — C1726 CATH, BAL DIL, NON-VASCULAR: HCPCS | Performed by: INTERNAL MEDICINE

## 2023-01-16 PROCEDURE — 700111 HCHG RX REV CODE 636 W/ 250 OVERRIDE (IP)

## 2023-01-16 PROCEDURE — 700111 HCHG RX REV CODE 636 W/ 250 OVERRIDE (IP): Performed by: ANESTHESIOLOGY

## 2023-01-16 PROCEDURE — 43239 EGD BIOPSY SINGLE/MULTIPLE: CPT | Mod: 59 | Performed by: INTERNAL MEDICINE

## 2023-01-16 PROCEDURE — 160035 HCHG PACU - 1ST 60 MINS PHASE I: Performed by: INTERNAL MEDICINE

## 2023-01-16 PROCEDURE — 84100 ASSAY OF PHOSPHORUS: CPT

## 2023-01-16 PROCEDURE — 99221 1ST HOSP IP/OBS SF/LOW 40: CPT | Mod: 25 | Performed by: INTERNAL MEDICINE

## 2023-01-16 PROCEDURE — 99239 HOSP IP/OBS DSCHRG MGMT >30: CPT | Performed by: STUDENT IN AN ORGANIZED HEALTH CARE EDUCATION/TRAINING PROGRAM

## 2023-01-16 PROCEDURE — 700105 HCHG RX REV CODE 258: Performed by: STUDENT IN AN ORGANIZED HEALTH CARE EDUCATION/TRAINING PROGRAM

## 2023-01-16 PROCEDURE — 700101 HCHG RX REV CODE 250: Performed by: ANESTHESIOLOGY

## 2023-01-16 PROCEDURE — 80048 BASIC METABOLIC PNL TOTAL CA: CPT

## 2023-01-16 PROCEDURE — 43249 ESOPH EGD DILATION <30 MM: CPT | Performed by: INTERNAL MEDICINE

## 2023-01-16 PROCEDURE — 0DB18ZX EXCISION OF UPPER ESOPHAGUS, VIA NATURAL OR ARTIFICIAL OPENING ENDOSCOPIC, DIAGNOSTIC: ICD-10-PCS | Performed by: INTERNAL MEDICINE

## 2023-01-16 PROCEDURE — 85027 COMPLETE CBC AUTOMATED: CPT

## 2023-01-16 PROCEDURE — 88312 SPECIAL STAINS GROUP 1: CPT

## 2023-01-16 PROCEDURE — 160048 HCHG OR STATISTICAL LEVEL 1-5: Performed by: INTERNAL MEDICINE

## 2023-01-16 PROCEDURE — 83735 ASSAY OF MAGNESIUM: CPT

## 2023-01-16 PROCEDURE — 88305 TISSUE EXAM BY PATHOLOGIST: CPT | Mod: 59

## 2023-01-16 PROCEDURE — 0D758ZZ DILATION OF ESOPHAGUS, VIA NATURAL OR ARTIFICIAL OPENING ENDOSCOPIC: ICD-10-PCS | Performed by: INTERNAL MEDICINE

## 2023-01-16 PROCEDURE — 700111 HCHG RX REV CODE 636 W/ 250 OVERRIDE (IP): Performed by: NURSE PRACTITIONER

## 2023-01-16 PROCEDURE — 700105 HCHG RX REV CODE 258: Performed by: ANESTHESIOLOGY

## 2023-01-16 RX ORDER — SODIUM CHLORIDE 9 MG/ML
INJECTION, SOLUTION INTRAVENOUS CONTINUOUS
Status: DISCONTINUED | OUTPATIENT
Start: 2023-01-16 | End: 2023-01-16 | Stop reason: HOSPADM

## 2023-01-16 RX ORDER — DIPHENHYDRAMINE HYDROCHLORIDE 50 MG/ML
12.5 INJECTION INTRAMUSCULAR; INTRAVENOUS
Status: DISCONTINUED | OUTPATIENT
Start: 2023-01-16 | End: 2023-01-16 | Stop reason: HOSPADM

## 2023-01-16 RX ORDER — PROCHLORPERAZINE EDISYLATE 5 MG/ML
5 INJECTION INTRAMUSCULAR; INTRAVENOUS ONCE
Status: COMPLETED | OUTPATIENT
Start: 2023-01-16 | End: 2023-01-16

## 2023-01-16 RX ORDER — ONDANSETRON 2 MG/ML
4 INJECTION INTRAMUSCULAR; INTRAVENOUS
Status: DISCONTINUED | OUTPATIENT
Start: 2023-01-16 | End: 2023-01-16 | Stop reason: HOSPADM

## 2023-01-16 RX ORDER — LIDOCAINE HYDROCHLORIDE 20 MG/ML
INJECTION, SOLUTION EPIDURAL; INFILTRATION; INTRACAUDAL; PERINEURAL PRN
Status: DISCONTINUED | OUTPATIENT
Start: 2023-01-16 | End: 2023-01-16 | Stop reason: SURG

## 2023-01-16 RX ORDER — MIDAZOLAM HYDROCHLORIDE 1 MG/ML
INJECTION INTRAMUSCULAR; INTRAVENOUS PRN
Status: DISCONTINUED | OUTPATIENT
Start: 2023-01-16 | End: 2023-01-16 | Stop reason: SURG

## 2023-01-16 RX ORDER — MAGNESIUM SULFATE HEPTAHYDRATE 40 MG/ML
2 INJECTION, SOLUTION INTRAVENOUS ONCE
Status: COMPLETED | OUTPATIENT
Start: 2023-01-16 | End: 2023-01-16

## 2023-01-16 RX ORDER — METOPROLOL TARTRATE 50 MG/1
50 TABLET, FILM COATED ORAL TWICE DAILY
Status: DISCONTINUED | OUTPATIENT
Start: 2023-01-16 | End: 2023-01-16 | Stop reason: HOSPADM

## 2023-01-16 RX ORDER — HYDROMORPHONE HYDROCHLORIDE 1 MG/ML
1 INJECTION, SOLUTION INTRAMUSCULAR; INTRAVENOUS; SUBCUTANEOUS ONCE
Status: COMPLETED | OUTPATIENT
Start: 2023-01-16 | End: 2023-01-16

## 2023-01-16 RX ORDER — PROCHLORPERAZINE EDISYLATE 5 MG/ML
5 INJECTION INTRAMUSCULAR; INTRAVENOUS EVERY 6 HOURS PRN
Status: DISCONTINUED | OUTPATIENT
Start: 2023-01-16 | End: 2023-01-16

## 2023-01-16 RX ORDER — SODIUM CHLORIDE, SODIUM LACTATE, POTASSIUM CHLORIDE, CALCIUM CHLORIDE 600; 310; 30; 20 MG/100ML; MG/100ML; MG/100ML; MG/100ML
INJECTION, SOLUTION INTRAVENOUS
Status: DISCONTINUED | OUTPATIENT
Start: 2023-01-16 | End: 2023-01-16 | Stop reason: SURG

## 2023-01-16 RX ORDER — ROCURONIUM BROMIDE 10 MG/ML
INJECTION, SOLUTION INTRAVENOUS PRN
Status: DISCONTINUED | OUTPATIENT
Start: 2023-01-16 | End: 2023-01-16 | Stop reason: SURG

## 2023-01-16 RX ADMIN — PROPOFOL 150 MG: 10 INJECTION, EMULSION INTRAVENOUS at 12:49

## 2023-01-16 RX ADMIN — MAGNESIUM SULFATE HEPTAHYDRATE 2 G: 40 INJECTION, SOLUTION INTRAVENOUS at 10:02

## 2023-01-16 RX ADMIN — DIAZEPAM 2.5 MG: 5 INJECTION, SOLUTION INTRAMUSCULAR; INTRAVENOUS at 04:55

## 2023-01-16 RX ADMIN — DEXTROSE AND SODIUM CHLORIDE: 5; 900 INJECTION, SOLUTION INTRAVENOUS at 08:37

## 2023-01-16 RX ADMIN — HYDROMORPHONE HYDROCHLORIDE 1 MG: 1 INJECTION, SOLUTION INTRAMUSCULAR; INTRAVENOUS; SUBCUTANEOUS at 04:20

## 2023-01-16 RX ADMIN — LIDOCAINE HYDROCHLORIDE 50 MG: 20 INJECTION, SOLUTION EPIDURAL; INFILTRATION; INTRACAUDAL at 12:49

## 2023-01-16 RX ADMIN — HYDROMORPHONE HYDROCHLORIDE 0.5 MG: 1 INJECTION, SOLUTION INTRAMUSCULAR; INTRAVENOUS; SUBCUTANEOUS at 08:31

## 2023-01-16 RX ADMIN — PROCHLORPERAZINE EDISYLATE 5 MG: 5 INJECTION INTRAMUSCULAR; INTRAVENOUS at 14:30

## 2023-01-16 RX ADMIN — DIAZEPAM 2.5 MG: 5 INJECTION, SOLUTION INTRAMUSCULAR; INTRAVENOUS at 11:23

## 2023-01-16 RX ADMIN — SODIUM CHLORIDE, POTASSIUM CHLORIDE, SODIUM LACTATE AND CALCIUM CHLORIDE: 600; 310; 30; 20 INJECTION, SOLUTION INTRAVENOUS at 12:44

## 2023-01-16 RX ADMIN — ONDANSETRON 4 MG: 2 INJECTION INTRAMUSCULAR; INTRAVENOUS at 08:32

## 2023-01-16 RX ADMIN — ONDANSETRON 4 MG: 2 INJECTION INTRAMUSCULAR; INTRAVENOUS at 04:22

## 2023-01-16 RX ADMIN — SUGAMMADEX 200 MG: 100 INJECTION, SOLUTION INTRAVENOUS at 13:04

## 2023-01-16 RX ADMIN — MIDAZOLAM HYDROCHLORIDE 1 MG: 1 INJECTION, SOLUTION INTRAMUSCULAR; INTRAVENOUS at 12:45

## 2023-01-16 RX ADMIN — ONDANSETRON 4 MG: 2 INJECTION INTRAMUSCULAR; INTRAVENOUS at 12:59

## 2023-01-16 RX ADMIN — HYDROMORPHONE HYDROCHLORIDE 0.5 MG: 1 INJECTION, SOLUTION INTRAMUSCULAR; INTRAVENOUS; SUBCUTANEOUS at 16:11

## 2023-01-16 RX ADMIN — METOPROLOL TARTRATE 50 MG: 50 TABLET, FILM COATED ORAL at 16:11

## 2023-01-16 RX ADMIN — ROCURONIUM BROMIDE 22 MG: 10 INJECTION, SOLUTION INTRAVENOUS at 12:49

## 2023-01-16 ASSESSMENT — LIFESTYLE VARIABLES
HAVE YOU EVER FELT YOU SHOULD CUT DOWN ON YOUR DRINKING: NO
CONSUMPTION TOTAL: NEGATIVE
TOTAL SCORE: 0
AVERAGE NUMBER OF DAYS PER WEEK YOU HAVE A DRINK CONTAINING ALCOHOL: 0
EVER HAD A DRINK FIRST THING IN THE MORNING TO STEADY YOUR NERVES TO GET RID OF A HANGOVER: NO
TOTAL SCORE: 0
EVER FELT BAD OR GUILTY ABOUT YOUR DRINKING: NO
TOTAL SCORE: 0
HOW MANY TIMES IN THE PAST YEAR HAVE YOU HAD 5 OR MORE DRINKS IN A DAY: 0
ALCOHOL_USE: NO
ON A TYPICAL DAY WHEN YOU DRINK ALCOHOL HOW MANY DRINKS DO YOU HAVE: 0
DOES PATIENT WANT TO STOP DRINKING: CANNOT ASSESS
HAVE PEOPLE ANNOYED YOU BY CRITICIZING YOUR DRINKING: NO

## 2023-01-16 ASSESSMENT — COGNITIVE AND FUNCTIONAL STATUS - GENERAL
MOBILITY SCORE: 24
DAILY ACTIVITIY SCORE: 24
SUGGESTED CMS G CODE MODIFIER DAILY ACTIVITY: CH
SUGGESTED CMS G CODE MODIFIER MOBILITY: CH

## 2023-01-16 ASSESSMENT — ENCOUNTER SYMPTOMS
NAUSEA: 0
ROS GI COMMENTS: DIFFICULTY SWALLOWING
ABDOMINAL PAIN: 1
SHORTNESS OF BREATH: 0
CHILLS: 0
FEVER: 0
PALPITATIONS: 0
VOMITING: 0
CONSTIPATION: 1

## 2023-01-16 ASSESSMENT — PATIENT HEALTH QUESTIONNAIRE - PHQ9
2. FEELING DOWN, DEPRESSED, IRRITABLE, OR HOPELESS: NOT AT ALL
SUM OF ALL RESPONSES TO PHQ9 QUESTIONS 1 AND 2: 0
1. LITTLE INTEREST OR PLEASURE IN DOING THINGS: NOT AT ALL

## 2023-01-16 ASSESSMENT — PAIN SCALES - GENERAL: PAIN_LEVEL: 2

## 2023-01-16 ASSESSMENT — FIBROSIS 4 INDEX: FIB4 SCORE: 1.58

## 2023-01-16 NOTE — PROCEDURES
OPERATIVE REPORT    PATIENT:   Jana Overton   1955       PREOPERATIVE DIAGNOSES/INDICATIONS: Dysphagia     POSTOPERATIVE DIAGNOSIS: Esophageal narrowing, s/p biopsy and balloon dilatation upto 13.5 mm.     PROCEDURE:  ESOPHAGOGASTRODUODENOSCOPY    PHYSICIAN:  Jamarcus Davalos MD. MPH.    ANESTHESIA:  Per anesthesiologist.    LOCATION: [facility]    CONSENT:  OBTAINED. The risks, benefits and alternatives of the procedure were discussed in details. The risks include and are not limited to bleeding, infection, perforation, missed lesions, and sedations risks (cardiopulmonary compromise and allergic reaction to medications).    DESCRIPTION: The patient presented to the procedure room.  After routine checkup was performed, patient was brought into the endoscopy suite.  Patient was placed on his left lateral decubitus position. A bite block was placed in patient's mouth. Patient was sedated by anesthesia.  Vital signs were monitored throughout the procedure.  Oxygenation support was provided throughout the procedure. Upper endoscope was inserted into patient's mouth and advanced to the second portion of the duodenum under direct visualization.      Once the site was reached and examined, the upper endoscope was withdrawn.  Retroflexion was made within the stomach.  The stomach was decompressed, scope was withdrawn and the procedure was terminated.    The patient tolerated the procedure well.  There were no immediate complications.    OPERATIVE FINDINGS:    1. Esophagus: tortuous esophagus, two possible narrowings were seen at 38 and 40cm from incisors. Biopsy was done at distal and proximal esophagus for diagnosis. Balloon dilatation from 12 to 13mm with mild mucosal injury after the dilatation.   2. Stomach: Grossly normal.   3. Duodenum: Grossly normal.     IMPRESSION/RECOMMENDATIONS:  The esophagus narrowing was more overt on esophagram, rather than EGD. Spasm or other muscular etiology should be consider.   After  this procedure, further balloon dilatation might only help partially. The patient should have outpatient GI clinic and possible manometry of esophagus.     Bedside speech evaluation, then okay to advance diet. If okay to tolerate full liquid diet, the patient can go home.     If dilatation is not helping, she should consider NG feeding tube before we further investigate her esophagus pathology.     GI team will follow up on the pathology. GI team signs off on 1/16.     This note has been transcribed with digital voice recognition software and although it has been reviewed may contain grammatical or word errors

## 2023-01-16 NOTE — OR NURSING
1308 pt arrived from OR. Report received. Pt arouses to voice on 6L mask. Denies pain at this time. Per MD pt ok to have IV fentanyl for pain if needed.     1315 Report to Alessandra BARNES.

## 2023-01-16 NOTE — ANESTHESIA PROCEDURE NOTES
Airway    Date/Time: 1/16/2023 12:50 PM  Performed by: Terrence Desai M.D.  Authorized by: Terrence Desai M.D.     Location:  OR  Urgency:  Elective  Indications for Airway Management:  Anesthesia      Spontaneous Ventilation: absent    Sedation Level:  Deep  Preoxygenated: Yes    Patient Position:  Sniffing  Final Airway Type:  Endotracheal airway  Final Endotracheal Airway:  ETT  Cuffed: Yes    Technique Used for Successful ETT Placement:  Direct laryngoscopy    Insertion Site:  Oral  Blade Type:  Rosa  Laryngoscope Blade/Videolaryngoscope Blade Size:  3  ETT Size (mm):  6.5  Measured from:  Lips  ETT to Lips (cm):  21  Placement Verified by: auscultation and capnometry    Cormack-Lehane Classification:  Grade I - full view of glottis  Number of Attempts at Approach:  1

## 2023-01-16 NOTE — CONSULTS
Date of Consultation:  1/16/2023    Patient: : Jana Overton  MRN: [id number]    Referring Physician:  Dr. Edwin Velez.      GI:Jamarcus Davalos M.D.     Reason for Consultation: Recurrent dysphagia.     History of Present Illness:   The patient is a 67 years old female with a medical history of rheumatoid disease, back pain, Crohn's s/p resection of a bowel surgery, no regular GI doctor, not on any anti-Crohn's disease, presented with a recurrent dysphagia.    GI team scoped the patient about 2 months ago, s/p dilatation upto 11mm. Seems working but recurrent dysphagia was noted in the last week with fast progression.   GI team saw the patient at the bedside in the early morning.  No respiratory distress.  No issues with secretion.  The patient has stable vitals and in no other GI symptom no sign.  Lower GI stable.  Abdominal exam was grossly normal.          Past Medical History:   Diagnosis Date    Anesthesia     Difficult IV stick    Anxiety     Arthritis     all over    ASTHMA     Atrial fib/flut     Backpain     Bronchitis     5 years    Chronic pain     Colostomy in place ([facility])     COPD (chronic obstructive pulmonary disease) ([facility])     Crohn's disease of colon ([facility])     Dyspnea     History of cardiac murmur     Ileostomy present ([facility])     Infectious disease     MRSA, VancoRSA    Multiple falls     Narcotic dependence ([facility])     Obstruction     Pain     Pneumonia     child and mid 30's    Postherpetic neuralgia     Rosacea     Sleep apnea          Past Surgical History:   Procedure Laterality Date    IA UPPER GI ENDOSCOPY,DIAGNOSIS N/A 10/24/2022    Procedure: GASTROSCOPY;  Surgeon: Jamarcus Davalos M.D.;  Location: SURGERY SAME DAY [facility];  Service: Gastroenterology    BILIARY DILATATION N/A 10/24/2022    Procedure: DILATION, STRICTURE, BILE DUCT;  Surgeon: Jamarcus Davalos M.D.;  Location: SURGERY SAME DAY [facility];  Service: Gastroenterology    IA CYSTOSCOPY,INSERT URETERAL STENT Right 12/23/2021    Procedure:  CYSTOSCOPY, WITH URETERAL STENT EXCHANGE;  Surgeon: Jonathan Turcios M.D.;  Location: [facility]ERY [facility];  Service: Urology    MI CYSTO/URETERO/PYELOSCOPY, DX Right 12/23/2021    Procedure: URETEROSCOPY;  Surgeon: Jonathan Turcios M.D.;  Location: [facility];  Service: Urology    MI CYSTO/URETERO/PYELOSCOPY, DX Right 12/8/2021    Procedure: URETEROSCOPY;  Surgeon: Luc Hook M.D.;  Location: [facility];  Service: Urology    CYSTO STENT PLACEMNT PRE SURG Right 12/8/2021    Procedure: CYSTOSCOPY, WITH URETERAL STENT INSERTION;  Surgeon: Luc Hook M.D.;  Location: [facility];  Service: Urology    ILEOSTOMY  1/20/2021    Procedure: ILEOSTOMY- COMPLEX REVISION,;  Surgeon: Kevin Cruz M.D.;  Location: [facility];  Service: General    LYSIS ADHESIONS GENERAL  1/20/2021    Procedure: LYSIS, ADHESIONS;  Surgeon: Kevin Cruz M.D.;  Location: [facility];  Service: General    GASTROSCOPY N/A 5/22/2019    Procedure: GASTROSCOPY - WITH DILATION;  Surgeon: Dionicio Cardona M.D.;  Location: [facility];  Service: Gastroenterology    GASTROSCOPY  1/6/2019    Procedure: GASTROSCOPY- WITH DILATION;  Surgeon: Daniel Daniels M.D.;  Location: [facility];  Service: Gastroenterology    ILEOSTOMY  12/29/2018    Procedure: ILEOSTOMY- REVISION;  Surgeon: Kevin Cruz M.D.;  Location: [facility];  Service: General    SIGMOIDOSCOPY FLEX  12/29/2018    Procedure: SIGMOIDOSCOPY FLEX;  Surgeon: Kevin Cruz M.D.;  Location: [facility];  Service: General    EXPLORATORY LAPAROTOMY  12/29/2018    Procedure: EXPLORATORY LAPAROTOMY, lysis of adhesions;  Surgeon: Kevin Cruz M.D.;  Location: [facility];  Service: General    ILEOSTOMY  5/14/2014    Performed by Kevin Cruz M.D. at SURGERY [facility]    MAMMOPLASTY REDUCTION  7/17/2013    Performed by Janey Burt M.D. at SURGERY [facility]     HARDWARE REMOVAL NEURO  7/16/2012    Performed by KEELEY KIM at SURGERY [facility]    GASTROSCOPY  10/4/2011    Performed by TYSON MARTINEZ at SURGERY SAME DAY [facility]    COLONOSCOPY  10/4/2011    Performed by TYSON MARTINEZ at SURGERY SAME DAY [facility]    DILATION AND CURETTAGE  9/24/2010    Performed by GAURAV ALY at SURGERY SAME DAY [facility]    ILEOSTOMY  11/11/2009    Performed by SYDNEE AUGUSTINE at SURGERY [facility]    GASTROSCOPY WITH BIOPSY  11/22/08    Performed by NEGRITA HUYNH at SURGERY [facility]    ABDOMINAL EXPLORATION      CERVICAL DISK AND FUSION ANTERIOR      COLON RESECTION      FOOT SURGERY      HAND SURGERY      LUMBAR FUSION ANTERIOR      LUMPECTOMY      OTHER ABDOMINAL SURGERY      surgery for chrons disease    ID BREAST REDUCTION         Family History   Problem Relation Age of Onset    Lung Disease Mother         copd    Diabetes Father     Heart Disease Father     Diabetes Sister     Cancer Maternal Aunt 40        breast       Social History     Socioeconomic History    Marital status:     Highest education level: Associate degree: academic program   Tobacco Use    Smoking status: Never    Smokeless tobacco: Never    Tobacco comments:     second hand smoke parents - smoked for only 1 year many years ago   Vaping Use    Vaping Use: Every day    Substances: THC   Substance and Sexual Activity    Alcohol use: Not Currently     Alcohol/week: 0.6 oz     Types: 1 Shots of liquor per week     Comment: gin and half a lime; tonic water    Drug use: Yes     Types: Marijuana, Inhaled     Comment: medical marijuana through bong/vape     Social Determinants of Health     Financial Resource Strain: Unknown    Difficulty of Paying Living Expenses: Patient refused   Food Insecurity: Unknown    Worried About Running Out of Food in the Last Year: Patient refused    Ran Out of Food in the Last Year: Patient refused   Transportation Needs: Unknown    Lack  of Transportation (Medical): Patient refused    Lack of Transportation (Non-Medical): Patient refused   Physical Activity: Sufficiently Active    Days of Exercise per Week: 7 days    Minutes of Exercise per Session: 60 min   Social Connections: Socially Integrated    Frequency of Communication with Friends and Family: Three times a week    Frequency of Social Gatherings with Friends and Family: Once a week    Attends Judaism Services: More than 4 times per year    Active Member of Clubs or Organizations: Yes    Attends Club or Organization Meetings: More than 4 times per year    Marital Status:    Housing Stability: Unknown    Unable to Pay for Housing in the Last Year: Patient refused    Number of Places Lived in the Last Year: 1    Unstable Housing in the Last Year: No       Constitutional: Denies fevers.  Eyes: Denies symptoms.   Ears/Nose/Throat/Mouth: Some dysphagia.   Cardiovascular: Denies chest pain.   Respiratory: Denies shortness of breath.  Gastrointestinal/Hepatic: Per H/P.   Genitourinary: Denies burning.        HEENT: grossly normal.  Cardiovascular: Normal heart rate.  Lungs: Respiratory effort is normal.   Abdomen: Soft, No tenderness.  Skin: No erythema, No rash.  Lower limbs: normal, no pitting edema.   Neurologic: Alert & oriented x 3, Normal motor function, No focal deficits noted.  PSY: stable mood.       Physical Exam:  Vitals:    01/15/23 2305 01/16/23 0255 01/16/23 0431 01/16/23 0723   BP:   (!) 157/79 124/80   Pulse:   (!) 110 95   Resp: 19  19 18   Temp:   36.7 °C (98.1 °F) 36.6 °C (97.9 °F)   TempSrc:   Temporal Temporal   SpO2:   94% 95%   Weight:  67 kg (147 lb 11.3 oz)     Height:                 Labs:  Recent Labs     01/15/23  1420 01/16/23  0348   WBC 9.6 8.2   RBC 4.52 3.89*   HEMOGLOBIN 13.7 11.9*   HEMATOCRIT 41.5 35.4*   MCV 91.8 91.0   MCH 30.3 30.6   MCHC 33.0* 33.6   RDW 45.1 44.7   PLATELETCT 228 188   MPV 10.4 10.4     Recent Labs     01/15/23  1420 01/16/23  0348    SODIUM 134* 139   POTASSIUM 4.9 4.0   CHLORIDE 97 106   CO2 26 25   GLUCOSE 91 91   BUN 23* 17           Recent Labs     01/15/23  1420   ASTSGOT 35   ALTSGPT 45   TBILIRUBIN 0.2   ALKPHOSPHAT 148*   GLOBULIN 3.8*         Imaging:  DX-UPPER GI-SERIES WITH KUB  Narrative: 1/15/2023 4:05 PM    HISTORY/REASON FOR EXAM:  Gastrointestinal Complaint; Per GI Dr. Mike request barium swallow with tablet, history of strictures with complaints of vomiting.  History: Difficulty swallowing, vomiting, history Chron's Disease    TECHNIQUE/EXAM DESCRIPTION AND NUMBER OF VIEWS:  Single contrast barium upper GI series was performed.    6 fluoroscopic images obtained.  4 overhead image obtained.    Fluoroscopy time: 0.7 minutes    COMPARISON: 9/27/2022.    FINDINGS:     radiograph demonstrates nonobstructive bowel gas pattern.    Diffuse narrowing in the distal esophagus with 2 focal severe strictures. Very minimal contrast passed to the stomach. Mildly prominent appearance of the mid and proximal esophagus.    Moderate esophageal dysmotility.  Impression: Diffuse narrowing in the distal esophagus with 2 focal severe strictures. Significant holdup of contrast in the proximal esophagus with very minimal contrast passed to the stomach.    CRITICAL RESULT READ BACK: Preliminary findings discussed with and critical read back performed by Dr. MICK CHAVIS  via telephone on 1/15/2023 4:53 PM            Impressions:  67 years old female with a history of Crohn's disease not on anti-Crohn disease meds, recurrent dysphagia s/p recent dilatation by the GI team.  Presented to GI team again for recurrent swallow symptom.    We saw the patient at bedside, consistent with recurrent dysphagia.  We reviewed the esophagram from yesterday with patient as well.    Her dysphagia symptoms could be from previous Crohn disease or peptic stenosis.    GI team plan to repeat upper GI scope with possible dilatation today.          This note was  generated using voice recognition software which has a small chance of producing errors of grammar and possibly content. I have made every reasonable attempt to find and correct any obvious errors, but expect that some may not be found prior to finalization of this note.

## 2023-01-16 NOTE — ANESTHESIA TIME REPORT
Anesthesia Start and Stop Event Times     Date Time Event    1/16/2023 1240 Ready for Procedure     1245 Anesthesia Start     1311 Anesthesia Stop        Responsible Staff  01/16/23    Name Role Begin End    Terrence Desai M.D. Anesth 1245 1311        Overtime Reason:  no overtime (within assigned shift)    Comments:

## 2023-01-16 NOTE — CARE PLAN
The patient is Stable - Low risk of patient condition declining or worsening    Shift Goals  Clinical Goals: pain/nausea/anxiety control, EGD  Patient Goals: pain/nausea/anxiety control, EGD    Progress made toward(s) clinical / shift goals:        Problem: Knowledge Deficit - Standard  Goal: Patient and family/care givers will demonstrate understanding of plan of care, disease process/condition, diagnostic tests and medications  Outcome: Progressing     Problem: Pain - Standard  Goal: Alleviation of pain or a reduction in pain to the patient’s comfort goal  Outcome: Progressing     Problem: Fall Risk  Goal: Patient will remain free from falls  Outcome: Progressing       Patient is not progressing towards the following goals:    Stabilize BP, advance diet

## 2023-01-16 NOTE — H&P
Hospital Medicine History & Physical Note    Date of Service  1/15/2023    Primary Care Physician  Chase Charles D.O.    Consultants  GI        Code Status  Full Code    Chief Complaint  Chief Complaint   Patient presents with    Weight Loss    Difficulty Swallowing     Pt reports symptoms to have progressed over the last 6-7 weeks. Pt states to have Chron's disease, has a stricture in throat that has forced pt to eat almost nothing, pt states 24lbs weight loss over the past 6 weeks. Pt with obvious dry mucus membranes.        History of Presenting Illness  Jana Overton is a 67 y.o. female with a history of lupus on plaquenil, Crohn disease s/p ileectomy, fibromyalgia, who presented 1/15/2023 with difficulty swallowing.  Patient has a history of esophagus stricture s/p dilation.  She underwent another esophageal dilation 10/2022 by GI .  Her dysphagia initially improved, however, it has been getting worse for past 2 to 3 days.  She is not able to tolerate any water or food, not able to swallow pills. Reported Nausea vomiting throat pain, all over body pain, leg swelling     Upper GI series showed diffuse narrowing in the distal esophagus with 2 focal severe strictures.  GI consulted, plan for dilation tomorrow.       I discussed the plan of care with patient, family, and ERP .    Review of Systems  Review of Systems   Constitutional:  Positive for malaise/fatigue. Negative for chills and fever.   HENT:  Negative for ear pain.    Eyes:  Negative for pain.   Respiratory:  Negative for cough, shortness of breath and wheezing.    Cardiovascular:  Positive for chest pain and leg swelling.   Gastrointestinal:  Positive for abdominal pain, nausea and vomiting.   Genitourinary:  Negative for dysuria and urgency.   Musculoskeletal:  Positive for myalgias.   Skin:  Negative for rash.   Neurological:  Negative for headaches.   Psychiatric/Behavioral:  Negative for depression.      Past Medical History   has a past  medical history of Anesthesia, Anxiety, Arthritis, ASTHMA, Atrial fib/flut, Backpain, Bronchitis, Chronic pain, Colostomy in place ([facility]), COPD (chronic obstructive pulmonary disease) ([facility]), Crohn's disease of colon ([facility]), Dyspnea, History of cardiac murmur, Ileostomy present ([facility]), Infectious disease, Multiple falls, Narcotic dependence ([facility]), Obstruction, Pain, Pneumonia, Postherpetic neuralgia, Rosacea, and Sleep apnea.    Surgical History   has a past surgical history that includes lumbar fusion anterior; cervical disk and fusion anterior; foot surgery; hand surgery; other abdominal surgery; gastroscopy with biopsy (11/22/08); ileostomy (11/11/2009); dilation and curettage (9/24/2010); gastroscopy (10/4/2011); colonoscopy (10/4/2011); hardware removal neuro (7/16/2012); mammoplasty reduction (7/17/2013); ileostomy (5/14/2014); pr breast reduction; abdominal exploration; lumpectomy; colon resection; ileostomy (12/29/2018); sigmoidoscopy flex (12/29/2018); exploratory laparotomy (12/29/2018); gastroscopy (1/6/2019); gastroscopy (N/A, 5/22/2019); ileostomy (1/20/2021); lysis adhesions general (1/20/2021); pr cysto/uretero/pyeloscopy, dx (Right, 12/8/2021); cysto stent placemnt pre surg (Right, 12/8/2021); pr cystoscopy,insert ureteral stent (Right, 12/23/2021); pr cysto/uretero/pyeloscopy, dx (Right, 12/23/2021); pr upper gi endoscopy,diagnosis (N/A, 10/24/2022); and biliary dilatation (N/A, 10/24/2022).     Family History  family history includes Cancer (age of onset: 40) in her maternal aunt; Diabetes in her father and sister; Heart Disease in her father; Lung Disease in her mother.   Family history reviewed with patient. There is no family history that is pertinent to the chief complaint.     Social History   reports that she has never smoked. She has never used smokeless tobacco. She reports that she does not currently use alcohol after a past usage of about 0.6 oz per week. She reports current drug use. Drugs:  "Marijuana and Inhaled.    Allergies  Allergies   Allergen Reactions    Demerol Hcl [Meperidine] Shortness of Breath and Palpitations    Methotrexate Hives, Shortness of Breath and Rash          Morphine Shortness of Breath and Palpitations    Promethazine Shortness of Breath and Rash          Remicade [Infliximab] Hives, Shortness of Breath and Rash          Sudafed [Pseudoephedrine] Shortness of Breath, Vomiting and Palpitations    Azathioprine Sodium Hives and Shortness of Breath     10/21/2022 - patient unable to verify allergy/reaction  Historical reaction listed: Hives, Shortness of Breath    Carafate [Sucralfate] Vomiting and Nausea     + Mouth Sores    Other Drug Unspecified     \"STEROIDS\" - REACTION NOT SPECIFIED    Sulfa Drugs Rash          Sulfasalazine Rash          Amitriptyline Unspecified     Nightmares      Ativan [Lorazepam] Unspecified     Nightmares    Betamethasone Unspecified     10/21/2022 - patient unable to verify allergy/reaction  No historical reaction listed    Fentanyl Unspecified     \"Patches didn't work\" and skin broke down    Lyrica [Pregabalin] Nausea          Methadone Unspecified     \"Didn't work\"    Potassium Unspecified     Notes: In IV only  REACTION NOT SPECIFIED    Tizanidine Unspecified     10/21/2022 - patient unable to verify allergy/reaction  No historical reaction listed    Toradol [Ketorolac Tromethamine] Unspecified     10/21/2022 - patient unable to verify allergy/reaction, states she was told by her doctor not to take       Medications  Prior to Admission Medications   Prescriptions Last Dose Informant Patient Reported? Taking?   Ascorbic Acid (VITAMIN C PO)  Patient Yes No   Sig: Take 1 Tablet by mouth 2 times a day.   Azelaic Acid 15 % Gel  Patient Yes No   Sig: Apply 1 Application topically every day. Apply to face.   Biotin w/ Vitamins C & E (HAIR SKIN & NAILS GUMMIES PO)  Patient Yes No   Sig: Take 2 Tablets by mouth every morning.   Maalox Plus - Diphenhydramine - " Lidocaine (MBX Oral Solution)   No No   Sig: 10 ml po q6 hours prn.  Dysphagia /esophagitis   Multiple Vitamins-Minerals (CENTRUM SILVER 50+WOMEN) Tab  Patient Yes No   Sig: Take 2 Tablets by mouth every morning. GUMMIES.   ZINC PICOLINATE PO  Patient Yes No   Sig: Take 1 Tablet by mouth 2 times a day.   diazePAM (VALIUM) 5 MG Tab   Yes No   Sig: diazepam 5 mg tablet   TAKE 1 TABLET BY MOUTH EVERY 6 HOURS AS NEEDED FOR MUSCLE SPASM   furosemide (LASIX) 20 MG Tab  Patient's Home Pharmacy No No   Sig: Take 1 Tablet by mouth 1 time a day as needed (leg swelling/weight gain).   granisetron (KYTRIL) 1 MG Tab  Patient's Home Pharmacy No No   Sig: Take 1 Tablet by mouth every 12 hours as needed for Nausea/Vomiting.   hydroxychloroquine (PLAQUENIL) 200 MG Tab  Patient's Home Pharmacy No No   Sig: Take 1.5 Tablets by mouth every day.   levalbuterol (XOPENEX HFA) 45 MCG/ACT inhaler   No No   Sig: Inhale 2 Puffs every four hours as needed for Shortness of Breath (As needed for shortness of breath, cough, wheezing.).   melatonin 5 mg Tab  Patient Yes No   Sig: Take 1 Tablet by mouth at bedtime as needed (Sleep).   metoprolol tartrate (LOPRESSOR) 50 MG Tab   No No   Sig: Take 1 Tablet by mouth 2 times a day.   naratriptan (AMERGE) 2.5 MG tablet  Patient's Home Pharmacy No No   Sig: Take 1 Tablet by mouth as needed for Migraine.   nystatin (MYCOSTATIN) 206182 UNIT/ML Suspension   No No   Sig: Take 5 mL by mouth 4 times a day.   ondansetron (ZOFRAN ODT) 4 MG TABLET DISPERSIBLE   No No   Sig: Take 1 Tablet by mouth every 6 hours as needed for Nausea/Vomiting.   oxyCODONE immediate release (ROXICODONE) 10 MG immediate release tablet   No No   Sig: Take 1 Tablet by mouth every 6 hours as needed for Severe Pain for up to 30 days.   potassium chloride SA (KDUR) 20 MEQ Tab CR  Patient's Home Pharmacy No No   Sig: Take 1 Tablet by mouth 1 time a day as needed (when you take lasix.).   potassium citrate-citric acid (CYTRA-K) 1127-847  MG/5ML oral soln   Yes No   Sig: 15 mL.   prochlorperazine (COMPAZINE) 10 MG Tab   No No   Sig: TAKE ONE TABLET BY MOUTH ONE TIME DAY AS NEEDED FOR NAUSEA AND VOMITING   spironolactone (ALDACTONE) 25 MG Tab  Patient's Home Pharmacy Yes No   Sig: Take 25 mg by mouth every day.   traZODone (DESYREL) 50 MG Tab   No No   Sig: TAKE ONE TABLET BY MOUTH EVERY EVENING      Facility-Administered Medications: None       Physical Exam  Temp:  [36.2 °C (97.2 °F)-36.9 °C (98.5 °F)] 36.9 °C (98.5 °F)  Pulse:  [] 102  Resp:  [19-20] 20  BP: (138-165)/(83-86) 156/83  SpO2:  [93 %-98 %] 98 %  Blood Pressure : 138/83   Temperature: 36.2 °C (97.2 °F)   Pulse: 91   Respiration: 19   Pulse Oximetry: 93 %       Physical Exam  Constitutional:       Appearance: She is ill-appearing.   HENT:      Head: Normocephalic.      Nose: Nose normal.      Mouth/Throat:      Mouth: Mucous membranes are moist.   Eyes:      Extraocular Movements: Extraocular movements intact.      Conjunctiva/sclera: Conjunctivae normal.   Cardiovascular:      Rate and Rhythm: Normal rate and regular rhythm.      Pulses: Normal pulses.      Heart sounds: Normal heart sounds.   Pulmonary:      Effort: Pulmonary effort is normal.      Breath sounds: Normal breath sounds. No wheezing or rales.   Chest:      Chest wall: Tenderness present.   Abdominal:      General: Bowel sounds are normal.      Palpations: Abdomen is soft.      Tenderness: There is abdominal tenderness. There is no guarding.      Comments: Ostomy bag in place       Musculoskeletal:         General: Swelling and tenderness present.      Cervical back: Normal range of motion and neck supple.   Skin:     General: Skin is warm.   Neurological:      Mental Status: She is alert and oriented to person, place, and time. Mental status is at baseline.   Psychiatric:         Mood and Affect: Mood normal.       Laboratory:  Recent Labs     01/15/23  1420   WBC 9.6   RBC 4.52   HEMOGLOBIN 13.7   HEMATOCRIT 41.5    MCV 91.8   MCH 30.3   MCHC 33.0*   RDW 45.1   PLATELETCT 228   MPV 10.4     Recent Labs     01/15/23  1420   SODIUM 134*   POTASSIUM 4.9   CHLORIDE 97   CO2 26   GLUCOSE 91   BUN 23*   CREATININE 1.23   CALCIUM 10.5     Recent Labs     01/15/23  1420   ALTSGPT 45   ASTSGOT 35   ALKPHOSPHAT 148*   TBILIRUBIN 0.2   GLUCOSE 91         No results for input(s): NTPROBNP in the last 72 hours.      No results for input(s): TROPONINT in the last 72 hours.    Imaging:  DX-UPPER GI-SERIES WITH KUB   Final Result      Diffuse narrowing in the distal esophagus with 2 focal severe strictures. Significant holdup of contrast in the proximal esophagus with very minimal contrast passed to the stomach.      CRITICAL RESULT READ BACK: Preliminary findings discussed with and critical read back performed by Dr. MICK CHAVIS  via telephone on 1/15/2023 4:53 PM          X-Ray:  I have personally reviewed the images and compared with prior images.    Assessment/Plan:  Justification for Admission Status  I anticipate this patient will require at least two midnights for appropriate medical management, necessitating inpatient admission because severe esophageal stricture requires a GI procedure    Patient will need a  bed on EMERGENCY service .  The need is secondary to severe esophageal stricture requires a GI procedure.    * Esophageal stricture- (present on admission)  Assessment & Plan   history of esophagus stricture s/p dilation.    She underwent another esophageal dilation 10/2022 by GI .      Worsening dysphagia for 3 days, not able to tolerate water or food, not able to swallow pills.     Upper GI series showed diffuse narrowing in the distal esophagus with 2 focal severe strictures.    GI consulted, plan for dilation tomorrow.     N.p.o.  IV meds for pain and nausea  IV fluid    Hyponatremia  Assessment & Plan  Mild   continue to monitor      Ileostomy in place (HCC)- (present on admission)  Assessment & Plan  Normal stool  output        VTE prophylaxis: SCDs/TEDs

## 2023-01-16 NOTE — ED NOTES
[facility]. Pt transported upstairs to [place] [id number]. Care released.

## 2023-01-16 NOTE — ASSESSMENT & PLAN NOTE
history of esophagus stricture s/p dilation and history of crohns.    She underwent another esophageal dilation 10/2022 by GI .      Worsening dysphagia for 3 days, not able to tolerate water or food, not able to swallow pills.     Upper GI series showed diffuse narrowing in the distal esophagus with 2 focal severe strictures.    GI consulted, plan for dilation tomorrow.     N.p.o.  IV meds for pain and nausea  IV fluid

## 2023-01-16 NOTE — ANESTHESIA POSTPROCEDURE EVALUATION
Patient: Jana Overton    Procedure Summary     Date: 01/16/23 Room / Location: [facility] ROOM 26 / SURGERY SAME DAY [facility]    Anesthesia Start: 1245 Anesthesia Stop: 1311    Procedures:       GASTROSCOPY (Esophagus)      GASTROSCOPY, WITH BALLOON DILATION (Esophagus)      GASTROSCOPY, WITH BIOPSY (Esophagus) Diagnosis: (esophageal distal narrowing)    Surgeons: Jamarcus Davalos M.D. Responsible Provider: Terrence Desai M.D.    Anesthesia Type: MAC ASA Status: 3          Final Anesthesia Type: MAC  Last vitals  BP   Blood Pressure : (!) 176/79    Temp   37.2 °C (99 °F)    Pulse   (!) 109   Resp   18    SpO2   96 %      Anesthesia Post Evaluation    Patient location during evaluation: PACU  Patient participation: complete - patient participated  Level of consciousness: awake and alert  Pain score: 2    Airway patency: patent  Anesthetic complications: no  Cardiovascular status: hemodynamically stable  Respiratory status: acceptable  Hydration status: euvolemic    PONV: none          No notable events documented.     Nurse Pain Score: 8 (NPRS)

## 2023-01-16 NOTE — CARE PLAN
The patient is Stable - Low risk of patient condition declining or worsening    Shift Goals  Clinical Goals: pain control, safety, NPO  Patient Goals: valium, procedure, ambulate    Progress made toward(s) clinical / shift goals:  NPO for EGD dilation on 1/16, ind in room able to make her needs known.     Patient is not progressing towards the following goals: N/A

## 2023-01-16 NOTE — ED NOTES
Med rec complete per patient at bedside and per  through the phone. Unable to verify oral liquid potassium concentration as pharmacy is closed.     Allergies reviewed.    No abx in the last 30 days.    [place] [facility] [place]

## 2023-01-16 NOTE — PROGRESS NOTES
Hospital Medicine Daily Progress Note    Date of Service  1/16/2023    Chief Complaint  aJna Overton is a 67 y.o. female admitted 1/15/2023 with dysphagia    Hospital Course  Jana Overton is a 67 y.o. female with a history of lupus on plaquenil, Crohn disease s/p ileectomy, fibromyalgia, who presented 1/15/2023 with difficulty swallowing.  Patient has a history of esophagus stricture s/p dilation.  She underwent another esophageal dilation 10/2022 by GI .  Her dysphagia initially improved, however, it has been getting worse for past 2 to 3 days.  She is not able to tolerate any water or food, not able to swallow pills. Reported Nausea vomiting throat pain, all over body pain, leg swelling      Upper GI series showed diffuse narrowing in the distal esophagus with 2 focal severe strictures.  GI consulted, plan for dilation tomorrow.     Interval Problem Update  Patient n.p.o. awaiting EGD today for stricture dilation.  Patient is hopeful to discharge later today.  If she is cleared by gastroenterology she will discharge home.  No ostomy output noted, however patient reports she has had almost nothing to eat for 3 days.    I have discussed this patient's plan of care and discharge plan at IDT rounds today with Case Management, Nursing, Nursing leadership, and other members of the IDT team.    Consultants/Specialty  GI    Code Status  Full Code    Disposition  Patient is not medically cleared for discharge.   Anticipate discharge to to home with close outpatient follow-up.  I have placed the appropriate orders for post-discharge needs.    Review of Systems  Review of Systems   Constitutional:  Negative for chills and fever.   Respiratory:  Negative for shortness of breath.    Cardiovascular:  Negative for chest pain and palpitations.   Gastrointestinal:  Positive for abdominal pain and constipation. Negative for nausea and vomiting.        Difficulty swallowing   All other systems reviewed and are negative.      Physical Exam  Temp:  [36.6 °C (97.9 °F)-37.2 °C (99 °F)] 37.2 °C (99 °F)  Pulse:  [] 109  Resp:  [18-20] 18  BP: (124-176)/(79-83) 176/79  SpO2:  [93 %-98 %] 96 %    Physical Exam  Vitals reviewed.   Constitutional:       General: She is not in acute distress.     Appearance: Normal appearance. She is ill-appearing.   HENT:      Head: Normocephalic and atraumatic.   Eyes:      General: No scleral icterus.        Right eye: No discharge.         Left eye: No discharge.   Cardiovascular:      Rate and Rhythm: Normal rate and regular rhythm.      Heart sounds: Normal heart sounds.   Pulmonary:      Effort: No respiratory distress.      Breath sounds: No wheezing or rales.   Abdominal:      General: Bowel sounds are normal. There is no distension.      Tenderness: There is no abdominal tenderness. There is no guarding.      Comments: Ileostomy present, empty on exam.  No signs of cellulitis.   Musculoskeletal:         General: No tenderness.      Right lower leg: No edema.      Left lower leg: No edema.   Skin:     General: Skin is warm and dry.      Coloration: Skin is not jaundiced.   Neurological:      General: No focal deficit present.      Mental Status: She is alert and oriented to person, place, and time.      Cranial Nerves: No cranial nerve deficit.   Psychiatric:         Mood and Affect: Mood normal.         Thought Content: Thought content normal.       Fluids    Intake/Output Summary (Last 24 hours) at 1/16/2023 1203  Last data filed at 1/15/2023 2000  Gross per 24 hour   Intake 0 ml   Output --   Net 0 ml       Laboratory  Recent Labs     01/15/23  1420 01/16/23  0348   WBC 9.6 8.2   RBC 4.52 3.89*   HEMOGLOBIN 13.7 11.9*   HEMATOCRIT 41.5 35.4*   MCV 91.8 91.0   MCH 30.3 30.6   MCHC 33.0* 33.6   RDW 45.1 44.7   PLATELETCT 228 188   MPV 10.4 10.4     Recent Labs     01/15/23  1420 01/16/23  0348   SODIUM 134* 139   POTASSIUM 4.9 4.0   CHLORIDE 97 106   CO2 26 25   GLUCOSE 91 91   BUN 23* 17    CREATININE 1.23 0.92   CALCIUM 10.5 9.3                   Imaging  DX-UPPER GI-SERIES WITH KUB   Final Result      Diffuse narrowing in the distal esophagus with 2 focal severe strictures. Significant holdup of contrast in the proximal esophagus with very minimal contrast passed to the stomach.      CRITICAL RESULT READ BACK: Preliminary findings discussed with and critical read back performed by Dr. MICK CHAVIS  via telephone on 1/15/2023 4:53 PM           Assessment/Plan  * Esophageal stricture- (present on admission)  Assessment & Plan   history of esophagus stricture s/p dilation and history of crohns.    She underwent another esophageal dilation 10/2022 by GI .      Worsening dysphagia for 3 days, not able to tolerate water or food, not able to swallow pills.     Upper GI series showed diffuse narrowing in the distal esophagus with 2 focal severe strictures.    GI consulted, plan for dilation tomorrow.     N.p.o.  IV meds for pain and nausea  IV fluid    Hypomagnesemia  Assessment & Plan  Mg 1.6  MgSO4 2gm    Hyponatremia  Assessment & Plan  Mild.  Due to hypovolemia  IV fluids  Resolved      Ileostomy in place (HCC)- (present on admission)  Assessment & Plan  No signs of cellulitis. No output in bag this morning         VTE prophylaxis: SCDs/TEDs    I have performed a physical exam and reviewed and updated ROS and Plan today (1/16/2023). In review of yesterday's note (1/15/2023), there are no changes except as documented above.

## 2023-01-16 NOTE — PROGRESS NOTES
4 Eyes Skin Assessment Completed by CAMERON Healy and CAMERON Lozoya.    Head WDL  Ears WDL  Nose WDL  Mouth WDL  Neck WDL  Breast/Chest WDL  Shoulder Blades WDL  Spine WDL  (R) Arm/Elbow/Hand WDL  (L) Arm/Elbow/Hand WDL  Abdomen WDL ostomy to LLQ  Groin WDL  Scrotum/Coccyx/Buttocks WDL  (R) Leg Swelling  (L) Leg Bruising  (R) Heel/Foot/Toe WDL  (L) Heel/Foot/Toe WDL          Devices In Places Blood Pressure Cuff      Interventions In Place N/A    Possible Skin Injury No    Pictures Uploaded Into Epic N/A  Wound Consult Placed N/A  RN Wound Prevention Protocol Ordered No

## 2023-01-16 NOTE — PROGRESS NOTES
2000: Pt arrived to unit for dx of esophageal stricture, she is A&O x4, on RA, steady gait. Oriented to room and staff. She is NPO for EGD dilation in AM. PRN dilaudid on board for general body, abdominal pain. Needed items within reach, staff made available.

## 2023-01-16 NOTE — PROGRESS NOTES
"2142: Pt in room ambulating ind, steady gait, she appears irritable/anxious demanding a trash can and biohazard bag and urinal for her ostomy care. This nurse and CNA at bedside offering to bring her supplies and this RN administered IV valium per pt request for muscle spasms, tolerated. Pt stating that needs her IV dilaudid dose increased, this nurse made pt aware that hospitalist will be reached for further orders. Ron made aware of pts request for dilaudid dose to be increased, no new orders at this time. Pt being monitored after administration of valium pt later stated she felt a \"a little better.\" Pt in room, needed items within reach.   "

## 2023-01-16 NOTE — ANESTHESIA PREPROCEDURE EVALUATION
Case: [id number] Date/Time: 01/16/23 1215    Procedure: GASTROSCOPY (Esophagus)    Anesthesia type: MAC    Pre-op diagnosis: Recurrent dysphagia    Location: [facility] ROOM 26 / SURGERY SAME DAY [facility]    Surgeons: Jamarcus Davalos M.D.      68 yo female with multiple medical problems    P Med Hx:  Crohn's disease of colon (HCC)  Bronchitis  Infectious disease  Atrial fib/flut  Pneumonia  Narcotic dependence   Colostomy in place   Arthritis  History of cardiac murmur  Dyspnea  Asthma  Backpain  Pain  Sleep apnea  Postherpetic neuralgia  Multiple falls  COPD (chronic obstructive pulmonary disease)   Rosacea  Anxiety  Chronic pain  Ileostomy present      Relevant Problems   ANESTHESIA   (positive) Sleep apnea      PULMONARY   (positive) COPD (chronic obstructive pulmonary disease) (HCC)      NEURO   (positive) Migraine      CARDIAC   (positive) Essential hypertension   (positive) Migraine   (positive) Paroxysmal atrial fibrillation (HCC)      GI   (positive) GERD (gastroesophageal reflux disease)         (positive) FLORES (acute kidney injury) ([facility])      Other   (positive) Inflammatory arthritis       Physical Exam    Airway   Mallampati: II  TM distance: >3 FB  Neck ROM: full       Cardiovascular - normal exam  Rhythm: regular  Rate: normal  (-) murmur     Dental - normal exam           Pulmonary - normal exam  Breath sounds clear to auscultation     Abdominal    Neurological - normal exam                 Anesthesia Plan    ASA 3   ASA physical status 3 criteria: COPD    Plan - MAC               Induction: intravenous    Postoperative Plan: Postoperative administration of opioids is intended.    Pertinent diagnostic labs and testing reviewed    Informed Consent:    Anesthetic plan and risks discussed with patient.    Use of blood products discussed with: patient whom consented to blood products.

## 2023-01-16 NOTE — PROGRESS NOTES
Reviewed the esophagram with ED provider.     Would recommend overnight stay in the hospital, NPO, fluid support, we would try EGD and possible dilatation on 1/16 morning.     Please have ESR and CRP.

## 2023-01-17 ENCOUNTER — PATIENT OUTREACH (OUTPATIENT)
Dept: MEDICAL GROUP | Facility: LAB | Age: 68
End: 2023-01-17
Payer: MEDICARE

## 2023-01-17 NOTE — DISCHARGE SUMMARY
Discharge Summary    CHIEF COMPLAINT ON ADMISSION  Chief Complaint   Patient presents with    Weight Loss    Difficulty Swallowing     Pt reports symptoms to have progressed over the last 6-7 weeks. Pt states to have Chron's disease, has a stricture in throat that has forced pt to eat almost nothing, pt states 24lbs weight loss over the past 6 weeks. Pt with obvious dry mucus membranes.        Reason for Admission  Sent by MD     Admission Date  1/15/2023    CODE STATUS  Full Code    HPI & HOSPITAL COURSE  Jana Overton is a 67 y.o. female with a history of lupus on plaquenil, Crohn disease s/p ileectomy, fibromyalgia, who presented 1/15/2023 with difficulty swallowing.  Patient has a history of esophagus stricture s/p dilation.  She underwent another esophageal dilation 10/2022 by GI .  Her dysphagia initially improved, however, it has been getting worse for past 2 to 3 days.  She is not able to tolerate any water or food, not able to swallow pills. Reported Nausea vomiting throat pain, all over body pain, leg swelling      Upper GI series showed diffuse narrowing in the distal esophagus with 2 focal severe strictures.  GI consulted.   EGD performed, esophagus dilated. Tolerating full liquids. Cleared by GI for discharge. Follow up with GI as an outpatient.    Therefore, she is discharged in good and stable condition to home with close outpatient follow-up.    The patient recovered much more quickly than anticipated on admission.    Discharge Date  1/16/23    FOLLOW UP ITEMS POST DISCHARGE  Esophageal stricture    DISCHARGE DIAGNOSES  Principal Problem:    Esophageal stricture POA: Yes  Active Problems:    Ileostomy in place (HCC) POA: Yes    Hyponatremia POA: Unknown    Hypomagnesemia POA: Unknown  Resolved Problems:    * No resolved hospital problems. *      FOLLOW UP  Future Appointments   Date Time Provider Department Center   3/1/2023  2:20 PM Chase Charles D.O. [facility] None   3/7/2023  3:15 PM Lilly Altamirano  [facility] None   3/16/2023  2:00 PM Petar Lewis M.D. [facility] None   6/13/2023  3:00 PM Harvey Monahan M.D. [facility] None     No follow-up provider specified.    MEDICATIONS ON DISCHARGE     Medication List        CONTINUE taking these medications        Instructions   Azelaic Acid 15 % Gel   Apply 1 Application topically every day. Apply to face.  Dose: 1 Application     Centrum Silver 50+Women Tabs   Take 2 Tablets by mouth every morning. GUMMIES.  Dose: 2 Tablet     diazePAM 5 MG Tabs  Commonly known as: VALIUM   diazepam 5 mg tablet   TAKE 1 TABLET BY MOUTH EVERY 6 HOURS AS NEEDED FOR MUSCLE SPASM     furosemide 20 MG Tabs  Commonly known as: LASIX   Take 1 Tablet by mouth 1 time a day as needed (leg swelling/weight gain).  Dose: 20 mg     granisetron 1 MG Tabs  Commonly known as: KYTRIL   Take 1 Tablet by mouth every 12 hours as needed for Nausea/Vomiting.  Dose: 1 mg     HAIR SKIN & NAILS GUMMIES PO   Take 2 Tablets by mouth every morning.  Dose: 2 Tablet     hydroxychloroquine 200 MG Tabs  Commonly known as: Plaquenil   Take 1.5 Tablets by mouth every day.  Dose: 300 mg     levalbuterol 45 MCG/ACT inhaler  Commonly known as: Xopenex HFA   Inhale 2 Puffs every four hours as needed for Shortness of Breath (As needed for shortness of breath, cough, wheezing.).  Dose: 2 Puff     Maalox Plus - Diphenhydramine - Lidocaine (MBX Oral Solution)   Doctor's comments: Mix Maalox: diphenhydramine: lidocaine 2% in 1:1:1 ratio.  10 ml po q6 hours prn.  Dysphagia /esophagitis     metoprolol tartrate 50 MG Tabs  Commonly known as: LOPRESSOR   Take 1 Tablet by mouth 2 times a day.  Dose: 50 mg     naratriptan 2.5 MG tablet  Commonly known as: AMERGE   Take 1 Tablet by mouth as needed for Migraine.  Dose: 2.5 mg     nystatin 401368 UNIT/ML Susp  Commonly known as: MYCOSTATIN   Take 5 mL by mouth 4 times a day.  Dose: 500,000 Units     ondansetron 4 MG Tbdp  Commonly known as: Zofran ODT   Take 1 Tablet by mouth every 6  "hours as needed for Nausea/Vomiting.  Dose: 4 mg     potassium chloride SA 20 MEQ Tbcr  Commonly known as: Kdur   Take 1 Tablet by mouth 1 time a day as needed (when you take lasix.).  Dose: 20 mEq     prochlorperazine 10 MG Tabs  Commonly known as: COMPAZINE   TAKE ONE TABLET BY MOUTH ONE TIME DAY AS NEEDED FOR NAUSEA AND VOMITING     spironolactone 25 MG Tabs  Commonly known as: ALDACTONE   Take 25 mg by mouth every day.  Dose: 25 mg     traZODone 50 MG Tabs  Commonly known as: DESYREL   TAKE ONE TABLET BY MOUTH EVERY EVENING     VITAMIN C PO   Take 1 Tablet by mouth 2 times a day.  Dose: 1 Tablet     ZINC PICOLINATE PO   Take 1 Tablet by mouth 2 times a day.  Dose: 1 Tablet            STOP taking these medications      POTASSIUM CHLORIDE PO            ASK your doctor about these medications        Instructions   oxyCODONE immediate release 10 MG immediate release tablet  Commonly known as: ROXICODONE   Take 1 Tablet by mouth every 6 hours as needed for Severe Pain for up to 30 days.  Dose: 10 mg              Allergies  Allergies   Allergen Reactions    Demerol Hcl [Meperidine] Shortness of Breath and Palpitations    Methotrexate Hives, Shortness of Breath and Rash          Morphine Shortness of Breath and Palpitations    Promethazine Shortness of Breath and Rash          Remicade [Infliximab] Hives, Shortness of Breath and Rash          Sudafed [Pseudoephedrine] Shortness of Breath, Vomiting and Palpitations    Azathioprine Sodium Hives and Shortness of Breath     10/21/2022 - patient unable to verify allergy/reaction  Historical reaction listed: Hives, Shortness of Breath    Carafate [Sucralfate] Vomiting and Nausea     + Mouth Sores    Other Drug Unspecified     \"STEROIDS\" - REACTION NOT SPECIFIED    Sulfa Drugs Rash          Sulfasalazine Rash          Amitriptyline Unspecified     Nightmares      Ativan [Lorazepam] Unspecified     Nightmares    Betamethasone Unspecified     10/21/2022 - patient unable to " "verify allergy/reaction  No historical reaction listed    Fentanyl Unspecified     \"Patches didn't work\" and skin broke down    Lyrica [Pregabalin] Nausea          Methadone Unspecified     \"Didn't work\"    Potassium Unspecified     Notes: In IV only  REACTION NOT SPECIFIED    Tizanidine Unspecified     10/21/2022 - patient unable to verify allergy/reaction  No historical reaction listed    Toradol [Ketorolac Tromethamine] Unspecified     10/21/2022 - patient unable to verify allergy/reaction, states she was told by her doctor not to take       DIET  Orders Placed This Encounter   Procedures    Diet NPO Restrict to: Ice Chips     Standing Status:   Standing     Number of Occurrences:   1     Order Specific Question:   Diet NPO Restrict to:     Answer:   Ice Chips [2]       ACTIVITY  As tolerated.  Weight bearing as tolerated    CONSULTATIONS  GI: Dr Davalos    PROCEDURES  1/16: EGD with dilation    LABORATORY  Lab Results   Component Value Date    SODIUM 139 01/16/2023    POTASSIUM 4.0 01/16/2023    CHLORIDE 106 01/16/2023    CO2 25 01/16/2023    GLUCOSE 91 01/16/2023    BUN 17 01/16/2023    CREATININE 0.92 01/16/2023    CREATININE 0.89 02/10/2010    GLOMRATE >59 02/10/2010        Lab Results   Component Value Date    WBC 8.2 01/16/2023    WBC 7.9 02/10/2010    HEMOGLOBIN 11.9 (L) 01/16/2023    HEMATOCRIT 35.4 (L) 01/16/2023    PLATELETCT 188 01/16/2023        Total time of the discharge process exceeds 38 minutes.  "

## 2023-01-17 NOTE — PROGRESS NOTES
Subjective:     Jana Overton is a 67 y.o. female who presents for Hospital Follow-up.    POST DISCHARGE CALL:  Discharge Date:1/16/2023   Date of Outreach Call: 1/17/2023  8:38 AM  Now that you're home, how are you doing? Good  Did you receive any new prescriptions? No  Were you able to fill those medications? Yes  How did you fill those prescriptions? Pharmacy  If not able to fill prescriptions, why? *    Do you have questions about your medications? No  Do you have a follow-up appointment scheduled?Yes  Any issues or paperwork you wish to discuss with your PCP? no  Does patient qualify for CCM Program? No  If patient qualifies, was CCM Program Introduced? *  If patient does not qualify, comment? *  Number of Attempts: 1  Current or previous attempts completed within two business days of discharge? Yes  Provided education regarding treatment plan, medication, self-management, ADLs? Yes  Has patient completed Advance Directive? If yes, advise them to bring to appointment. No  Care Manager phone number provided? No  Is there anything else I can help you with? No  Chief Complaint? weight loss, difficulty swallowing - Pt reports symptoms to have progressed over the last 6-7 weeks. Pt states to have Chron's disease, has a stricture in throat that has forced pt to eat almost nothing, pt states 24lbs weight loss over the past 6 weeks. Pt with obvious dry mucus membranes.  Admitting Dx? sent by MD  Discharge Diagnosis? esophageal stricture  Additional Comments? pt to f/u with GI    HPI:   Recently hospitalized for dysphagia, history of Crohn's disease, history of musculoskeletal spasm.    Current medicines (including reconciliation performed today)  Current Outpatient Medications   Medication Sig Dispense Refill    oxyCODONE immediate release (ROXICODONE) 10 MG immediate release tablet Take 1 Tablet by mouth every 6 hours as needed for Severe Pain for up to 30 days. (Patient taking differently: Take 10 mg by mouth every  6 hours as needed for Severe Pain. Patient states she takes at 0600, 1200, 1800, and 0000 every day) 120 Tablet 0    Maalox Plus - Diphenhydramine - Lidocaine (MBX Oral Solution) 10 ml po q6 hours prn.  Dysphagia /esophagitis 200 mL 3    nystatin (MYCOSTATIN) 143654 UNIT/ML Suspension Take 5 mL by mouth 4 times a day. 60 mL 1    diazePAM (VALIUM) 5 MG Tab diazepam 5 mg tablet   TAKE 1 TABLET BY MOUTH EVERY 6 HOURS AS NEEDED FOR MUSCLE SPASM      ondansetron (ZOFRAN ODT) 4 MG TABLET DISPERSIBLE Take 1 Tablet by mouth every 6 hours as needed for Nausea/Vomiting. 20 Tablet 5    metoprolol tartrate (LOPRESSOR) 50 MG Tab Take 1 Tablet by mouth 2 times a day. 180 Tablet 2    levalbuterol (XOPENEX HFA) 45 MCG/ACT inhaler Inhale 2 Puffs every four hours as needed for Shortness of Breath (As needed for shortness of breath, cough, wheezing.). 1 Each 11    prochlorperazine (COMPAZINE) 10 MG Tab TAKE ONE TABLET BY MOUTH ONE TIME DAY AS NEEDED FOR NAUSEA AND VOMITING 20 Tablet 0    traZODone (DESYREL) 50 MG Tab TAKE ONE TABLET BY MOUTH EVERY EVENING 30 Tablet 3    Multiple Vitamins-Minerals (CENTRUM SILVER 50+WOMEN) Tab Take 2 Tablets by mouth every morning. GUMMIES.      ZINC PICOLINATE PO Take 1 Tablet by mouth 2 times a day.      Ascorbic Acid (VITAMIN C PO) Take 1 Tablet by mouth 2 times a day.      Biotin w/ Vitamins C & E (HAIR SKIN & NAILS GUMMIES PO) Take 2 Tablets by mouth every morning.      spironolactone (ALDACTONE) 25 MG Tab Take 25 mg by mouth every day.      Azelaic Acid 15 % Gel Apply 1 Application topically every day. Apply to face.      granisetron (KYTRIL) 1 MG Tab Take 1 Tablet by mouth every 12 hours as needed for Nausea/Vomiting. 10 Tablet 0    hydroxychloroquine (PLAQUENIL) 200 MG Tab Take 1.5 Tablets by mouth every day. 135 Tablet 3    naratriptan (AMERGE) 2.5 MG tablet Take 1 Tablet by mouth as needed for Migraine. 5 Tablet 3    furosemide (LASIX) 20 MG Tab Take 1 Tablet by mouth 1 time a day as needed  (leg swelling/weight gain). 90 Tablet 3    potassium chloride SA (KDUR) 20 MEQ Tab CR Take 1 Tablet by mouth 1 time a day as needed (when you take lasix.). 90 Tablet 3     No current facility-administered medications for this visit.       Allergies:   Demerol hcl [meperidine], Methotrexate, Morphine, Promethazine, Remicade [infliximab], Sudafed [pseudoephedrine], Azathioprine sodium, Carafate [sucralfate], Other drug, Sulfa drugs, Sulfasalazine, Amitriptyline, Ativan [lorazepam], Betamethasone, Fentanyl, Lyrica [pregabalin], Methadone, Potassium, Tizanidine, and Toradol [ketorolac tromethamine]    Social History     Tobacco Use    Smoking status: Never    Smokeless tobacco: Never    Tobacco comments:     second hand smoke parents - smoked for only 1 year many years ago   Vaping Use    Vaping Use: Every day    Substances: THC   Substance Use Topics    Alcohol use: Not Currently     Alcohol/week: 0.6 oz     Types: 1 Shots of liquor per week     Comment: gin and half a lime; tonic water    Drug use: Yes     Types: Marijuana, Inhaled     Comment: medical marijuana through bong/vape       ROS:  BP (!) 140/72   Pulse 90   Temp 35.8 °C (96.5 °F) (Temporal)   Resp (!) 24   Ht 1.829 m (6')   Wt 68 kg (149 lb 14.4 oz)   SpO2 93%   BMI 20.33 kg/m²   Patient denies any nausea vomiting fever chills chest pain tightness anxiety depression.     Objective:     Physical Exam:  Heart rate rhythm is regular without murmurs rubs clicks, patient with lungs clear to auscultation, neck pain with spasm observed    Assessment and Plan:   1. 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 Crohn's disease of small intestine with complication (HCC)  Clinically stable  - prochlorperazine (COMPAZINE) 10 MG Tab; TAKE  ONE TABLET BY MOUTH ONE TIME DAY AS NEEDED FOR NAUSEA AND VOMITING  Dispense: 20 Tablet; Refill: 0    4. GERD with stricture  Continue Zofran  - ondansetron (ZOFRAN ODT) 4 MG TABLET DISPERSIBLE; Take 1 Tablet by mouth every 6 hours as needed for Nausea/Vomiting.  Dispense: 20 Tablet; Refill: 5    5. Nausea and vomiting, unspecified vomiting type  No change in medical therapy  - ondansetron (ZOFRAN ODT) 4 MG TABLET DISPERSIBLE; Take 1 Tablet by mouth every 6 hours as needed for Nausea/Vomiting.  Dispense: 20 Tablet; Refill: 5    6. Bilious vomiting with nausea  Continue Kytril as needed  - granisetron (KYTRIL) 1 MG Tab; Take 1 Tablet by mouth every 12 hours as needed for Nausea/Vomiting.  Dispense: 10 Tablet; Refill: 0      - Chart and discharge summary were reviewed.   - Hospitalization and results reviewed with patient.   - Medications reviewed including instructions regarding high risk medications, dosing and side effects.  - Recommended Services: No services needed at this time  - Advance directive/POLST on file?  No     Follow-up:No follow-ups on file.    Face-to-face transitional care management services with HIGH (today's visit is within days post discharge & LACE+ score 59+) medical decision complexity were provided.     LACE+ Historical Score Over Time (0-28: Low, 29-58: Medium, 59+: High): 75

## 2023-01-24 ENCOUNTER — OFFICE VISIT (OUTPATIENT)
Dept: MEDICAL GROUP | Facility: LAB | Age: 68
End: 2023-01-24
Payer: MEDICARE

## 2023-01-24 ENCOUNTER — HOSPITAL ENCOUNTER (OUTPATIENT)
Dept: LAB | Facility: MEDICAL CENTER | Age: 68
End: 2023-01-24
Attending: INTERNAL MEDICINE
Payer: MEDICARE

## 2023-01-24 VITALS
OXYGEN SATURATION: 93 % | HEIGHT: 72 IN | TEMPERATURE: 96.5 F | HEART RATE: 90 BPM | RESPIRATION RATE: 24 BRPM | BODY MASS INDEX: 20.3 KG/M2 | SYSTOLIC BLOOD PRESSURE: 140 MMHG | DIASTOLIC BLOOD PRESSURE: 72 MMHG | WEIGHT: 149.9 LBS

## 2023-01-24 DIAGNOSIS — K50.019 CROHN'S DISEASE OF SMALL INTESTINE WITH COMPLICATION (HCC): ICD-10-CM

## 2023-01-24 DIAGNOSIS — R13.10 DYSPHAGIA, UNSPECIFIED TYPE: ICD-10-CM

## 2023-01-24 DIAGNOSIS — Z93.2 ILEOSTOMY IN PLACE (HCC): ICD-10-CM

## 2023-01-24 DIAGNOSIS — G89.29 CENTRAL SENSITIZATION TO PAIN: ICD-10-CM

## 2023-01-24 DIAGNOSIS — R11.14 BILIOUS VOMITING WITH NAUSEA: ICD-10-CM

## 2023-01-24 DIAGNOSIS — Z79.891 CHRONIC USE OF OPIATE DRUG FOR THERAPEUTIC PURPOSE: ICD-10-CM

## 2023-01-24 DIAGNOSIS — K21.9 GERD WITH STRICTURE: ICD-10-CM

## 2023-01-24 DIAGNOSIS — K22.2 GERD WITH STRICTURE: ICD-10-CM

## 2023-01-24 DIAGNOSIS — Z79.899 CHRONIC USE OF BENZODIAZEPINE FOR THERAPEUTIC PURPOSE: ICD-10-CM

## 2023-01-24 DIAGNOSIS — M32.9 SYSTEMIC LUPUS ERYTHEMATOSUS, UNSPECIFIED SLE TYPE, UNSPECIFIED ORGAN INVOLVEMENT STATUS (HCC): ICD-10-CM

## 2023-01-24 DIAGNOSIS — R11.2 NAUSEA AND VOMITING, UNSPECIFIED VOMITING TYPE: ICD-10-CM

## 2023-01-24 PROCEDURE — 99214 OFFICE O/P EST MOD 30 MIN: CPT | Performed by: INTERNAL MEDICINE

## 2023-01-24 PROCEDURE — 83519 RIA NONANTIBODY: CPT

## 2023-01-24 PROCEDURE — 83516 IMMUNOASSAY NONANTIBODY: CPT | Mod: XU

## 2023-01-24 PROCEDURE — 36415 COLL VENOUS BLD VENIPUNCTURE: CPT

## 2023-01-24 RX ORDER — PROCHLORPERAZINE MALEATE 10 MG
TABLET ORAL
Qty: 20 TABLET | Refills: 0 | Status: SHIPPED
Start: 2023-01-24 | End: 2023-10-05

## 2023-01-24 RX ORDER — ONDANSETRON 4 MG/1
4 TABLET, ORALLY DISINTEGRATING ORAL EVERY 6 HOURS PRN
Qty: 20 TABLET | Refills: 5 | Status: SHIPPED | OUTPATIENT
Start: 2023-01-24

## 2023-01-24 RX ORDER — GRANISETRON HYDROCHLORIDE 1 MG/1
1 TABLET, FILM COATED ORAL EVERY 12 HOURS PRN
Qty: 10 TABLET | Refills: 0 | Status: SHIPPED | OUTPATIENT
Start: 2023-01-24

## 2023-01-24 ASSESSMENT — FIBROSIS 4 INDEX: FIB4 SCORE: 1.91

## 2023-01-25 ENCOUNTER — TELEPHONE (OUTPATIENT)
Dept: MEDICAL GROUP | Facility: LAB | Age: 68
End: 2023-01-25
Payer: MEDICARE

## 2023-01-27 DIAGNOSIS — G89.29 OTHER CHRONIC PAIN: ICD-10-CM

## 2023-01-27 LAB
ACHR BIND AB SER-SCNC: 0 NMOL/L (ref 0–0.4)
ACHR BLOCK AB/ACHR TOTAL SFR SER: 11 % (ref 0–26)

## 2023-01-31 ENCOUNTER — TELEPHONE (OUTPATIENT)
Dept: HEALTH INFORMATION MANAGEMENT | Facility: OTHER | Age: 68
End: 2023-01-31
Payer: MEDICARE

## 2023-01-31 RX ORDER — AMOXICILLIN AND CLAVULANATE POTASSIUM 875; 125 MG/1; MG/1
1 TABLET, FILM COATED ORAL 2 TIMES DAILY
Qty: 14 TABLET | Refills: 0 | Status: SHIPPED | OUTPATIENT
Start: 2023-01-31 | End: 2023-02-08

## 2023-01-31 RX ORDER — AMOXICILLIN AND CLAVULANATE POTASSIUM 875; 125 MG/1; MG/1
1 TABLET, FILM COATED ORAL 2 TIMES DAILY
Qty: 14 TABLET | Refills: 0 | Status: SHIPPED | OUTPATIENT
Start: 2023-01-31 | End: 2023-01-31 | Stop reason: SDUPTHER

## 2023-01-31 RX ORDER — METHYLPREDNISOLONE 4 MG/1
TABLET ORAL
Qty: 21 TABLET | Refills: 0 | Status: SHIPPED | OUTPATIENT
Start: 2023-01-31 | End: 2023-02-08

## 2023-02-07 ENCOUNTER — TELEPHONE (OUTPATIENT)
Dept: MEDICAL GROUP | Facility: LAB | Age: 68
End: 2023-02-07
Payer: MEDICARE

## 2023-02-07 ENCOUNTER — HOSPITAL ENCOUNTER (OUTPATIENT)
Facility: MEDICAL CENTER | Age: 68
End: 2023-02-08
Attending: EMERGENCY MEDICINE | Admitting: STUDENT IN AN ORGANIZED HEALTH CARE EDUCATION/TRAINING PROGRAM
Payer: MEDICARE

## 2023-02-07 DIAGNOSIS — K22.2 ESOPHAGEAL STRICTURE: ICD-10-CM

## 2023-02-07 DIAGNOSIS — D64.9 ANEMIA, UNSPECIFIED TYPE: ICD-10-CM

## 2023-02-07 DIAGNOSIS — Z93.2 ILEOSTOMY IN PLACE (HCC): ICD-10-CM

## 2023-02-07 DIAGNOSIS — E86.0 DEHYDRATION: ICD-10-CM

## 2023-02-07 DIAGNOSIS — K50.019 CROHN'S DISEASE OF SMALL INTESTINE WITH COMPLICATION (HCC): ICD-10-CM

## 2023-02-07 DIAGNOSIS — B37.81 ESOPHAGEAL CANDIDIASIS (HCC): ICD-10-CM

## 2023-02-07 LAB
ALBUMIN SERPL BCP-MCNC: 4.5 G/DL (ref 3.2–4.9)
ALBUMIN/GLOB SERPL: 1.5 G/DL
ALP SERPL-CCNC: 99 U/L (ref 30–99)
ALT SERPL-CCNC: 14 U/L (ref 2–50)
ANION GAP SERPL CALC-SCNC: 11 MMOL/L (ref 7–16)
AST SERPL-CCNC: 18 U/L (ref 12–45)
BASOPHILS # BLD AUTO: 0.6 % (ref 0–1.8)
BASOPHILS # BLD: 0.08 K/UL (ref 0–0.12)
BILIRUB SERPL-MCNC: 0.2 MG/DL (ref 0.1–1.5)
BUN SERPL-MCNC: 33 MG/DL (ref 8–22)
CALCIUM ALBUM COR SERPL-MCNC: 9.6 MG/DL (ref 8.5–10.5)
CALCIUM SERPL-MCNC: 10 MG/DL (ref 8.5–10.5)
CHLORIDE SERPL-SCNC: 101 MMOL/L (ref 96–112)
CO2 SERPL-SCNC: 26 MMOL/L (ref 20–33)
CREAT SERPL-MCNC: 1.16 MG/DL (ref 0.5–1.4)
EOSINOPHIL # BLD AUTO: 0.08 K/UL (ref 0–0.51)
EOSINOPHIL NFR BLD: 0.6 % (ref 0–6.9)
ERYTHROCYTE [DISTWIDTH] IN BLOOD BY AUTOMATED COUNT: 46.1 FL (ref 35.9–50)
GFR SERPLBLD CREATININE-BSD FMLA CKD-EPI: 51 ML/MIN/1.73 M 2
GLOBULIN SER CALC-MCNC: 3.1 G/DL (ref 1.9–3.5)
GLUCOSE SERPL-MCNC: 98 MG/DL (ref 65–99)
HCT VFR BLD AUTO: 38.6 % (ref 37–47)
HGB BLD-MCNC: 12.3 G/DL (ref 12–16)
IMM GRANULOCYTES # BLD AUTO: 0.06 K/UL (ref 0–0.11)
IMM GRANULOCYTES NFR BLD AUTO: 0.5 % (ref 0–0.9)
LIPASE SERPL-CCNC: 98 U/L (ref 11–82)
LYMPHOCYTES # BLD AUTO: 1.24 K/UL (ref 1–4.8)
LYMPHOCYTES NFR BLD: 10.1 % (ref 22–41)
MCH RBC QN AUTO: 30.1 PG (ref 27–33)
MCHC RBC AUTO-ENTMCNC: 31.9 G/DL (ref 33.6–35)
MCV RBC AUTO: 94.4 FL (ref 81.4–97.8)
MONOCYTES # BLD AUTO: 0.76 K/UL (ref 0–0.85)
MONOCYTES NFR BLD AUTO: 6.2 % (ref 0–13.4)
NEUTROPHILS # BLD AUTO: 10.1 K/UL (ref 2–7.15)
NEUTROPHILS NFR BLD: 82 % (ref 44–72)
NRBC # BLD AUTO: 0 K/UL
NRBC BLD-RTO: 0 /100 WBC
PLATELET # BLD AUTO: 233 K/UL (ref 164–446)
PMV BLD AUTO: 10.1 FL (ref 9–12.9)
POTASSIUM SERPL-SCNC: 4.1 MMOL/L (ref 3.6–5.5)
PROT SERPL-MCNC: 7.6 G/DL (ref 6–8.2)
RBC # BLD AUTO: 4.09 M/UL (ref 4.2–5.4)
SODIUM SERPL-SCNC: 138 MMOL/L (ref 135–145)
WBC # BLD AUTO: 12.3 K/UL (ref 4.8–10.8)

## 2023-02-07 PROCEDURE — 80053 COMPREHEN METABOLIC PANEL: CPT

## 2023-02-07 PROCEDURE — 96374 THER/PROPH/DIAG INJ IV PUSH: CPT

## 2023-02-07 PROCEDURE — 96375 TX/PRO/DX INJ NEW DRUG ADDON: CPT

## 2023-02-07 PROCEDURE — 83690 ASSAY OF LIPASE: CPT

## 2023-02-07 PROCEDURE — 96376 TX/PRO/DX INJ SAME DRUG ADON: CPT

## 2023-02-07 PROCEDURE — 99285 EMERGENCY DEPT VISIT HI MDM: CPT

## 2023-02-07 PROCEDURE — 700105 HCHG RX REV CODE 258: Performed by: EMERGENCY MEDICINE

## 2023-02-07 PROCEDURE — 700111 HCHG RX REV CODE 636 W/ 250 OVERRIDE (IP): Performed by: EMERGENCY MEDICINE

## 2023-02-07 PROCEDURE — 36415 COLL VENOUS BLD VENIPUNCTURE: CPT

## 2023-02-07 PROCEDURE — 85025 COMPLETE CBC W/AUTO DIFF WBC: CPT

## 2023-02-07 RX ORDER — HYDROMORPHONE HYDROCHLORIDE 1 MG/ML
1 INJECTION, SOLUTION INTRAMUSCULAR; INTRAVENOUS; SUBCUTANEOUS ONCE
Status: COMPLETED | OUTPATIENT
Start: 2023-02-07 | End: 2023-02-07

## 2023-02-07 RX ORDER — AMLODIPINE BESYLATE 2.5 MG/1
2.5 TABLET ORAL DAILY
Qty: 30 TABLET | Refills: 3 | Status: ON HOLD | OUTPATIENT
Start: 2023-02-07 | End: 2023-02-08

## 2023-02-07 RX ORDER — ONDANSETRON 2 MG/ML
4 INJECTION INTRAMUSCULAR; INTRAVENOUS ONCE
Status: COMPLETED | OUTPATIENT
Start: 2023-02-07 | End: 2023-02-07

## 2023-02-07 RX ORDER — SODIUM CHLORIDE, SODIUM LACTATE, POTASSIUM CHLORIDE, CALCIUM CHLORIDE 600; 310; 30; 20 MG/100ML; MG/100ML; MG/100ML; MG/100ML
1000 INJECTION, SOLUTION INTRAVENOUS ONCE
Status: COMPLETED | OUTPATIENT
Start: 2023-02-07 | End: 2023-02-08

## 2023-02-07 RX ADMIN — HYDROMORPHONE HYDROCHLORIDE 1 MG: 1 INJECTION, SOLUTION INTRAMUSCULAR; INTRAVENOUS; SUBCUTANEOUS at 21:29

## 2023-02-07 RX ADMIN — ONDANSETRON 4 MG: 2 INJECTION INTRAMUSCULAR; INTRAVENOUS at 21:29

## 2023-02-07 RX ADMIN — HYDROMORPHONE HYDROCHLORIDE 1 MG: 1 INJECTION, SOLUTION INTRAMUSCULAR; INTRAVENOUS; SUBCUTANEOUS at 23:26

## 2023-02-07 RX ADMIN — SODIUM CHLORIDE, POTASSIUM CHLORIDE, SODIUM LACTATE AND CALCIUM CHLORIDE 1000 ML: 600; 310; 30; 20 INJECTION, SOLUTION INTRAVENOUS at 21:50

## 2023-02-07 ASSESSMENT — FIBROSIS 4 INDEX: FIB4 SCORE: 1.91

## 2023-02-07 ASSESSMENT — PAIN DESCRIPTION - PAIN TYPE: TYPE: ACUTE PAIN

## 2023-02-08 ENCOUNTER — ANESTHESIA (OUTPATIENT)
Dept: SURGERY | Facility: MEDICAL CENTER | Age: 68
End: 2023-02-08
Payer: MEDICARE

## 2023-02-08 ENCOUNTER — ANESTHESIA EVENT (OUTPATIENT)
Dept: SURGERY | Facility: MEDICAL CENTER | Age: 68
End: 2023-02-08
Payer: MEDICARE

## 2023-02-08 VITALS
HEART RATE: 74 BPM | OXYGEN SATURATION: 95 % | TEMPERATURE: 98.3 F | BODY MASS INDEX: 20.18 KG/M2 | DIASTOLIC BLOOD PRESSURE: 64 MMHG | HEIGHT: 72 IN | RESPIRATION RATE: 18 BRPM | SYSTOLIC BLOOD PRESSURE: 142 MMHG | WEIGHT: 149 LBS

## 2023-02-08 PROBLEM — B37.81 ESOPHAGEAL CANDIDIASIS (HCC): Status: ACTIVE | Noted: 2023-02-08

## 2023-02-08 LAB
ALBUMIN SERPL BCP-MCNC: 3.9 G/DL (ref 3.2–4.9)
ALBUMIN/GLOB SERPL: 1.6 G/DL
ALP SERPL-CCNC: 78 U/L (ref 30–99)
ALT SERPL-CCNC: 12 U/L (ref 2–50)
ANION GAP SERPL CALC-SCNC: 9 MMOL/L (ref 7–16)
AST SERPL-CCNC: 19 U/L (ref 12–45)
BASOPHILS # BLD AUTO: 0.5 % (ref 0–1.8)
BASOPHILS # BLD: 0.05 K/UL (ref 0–0.12)
BILIRUB SERPL-MCNC: 0.6 MG/DL (ref 0.1–1.5)
BUN SERPL-MCNC: 25 MG/DL (ref 8–22)
CALCIUM ALBUM COR SERPL-MCNC: 9.3 MG/DL (ref 8.5–10.5)
CALCIUM SERPL-MCNC: 9.2 MG/DL (ref 8.5–10.5)
CHLORIDE SERPL-SCNC: 104 MMOL/L (ref 96–112)
CO2 SERPL-SCNC: 26 MMOL/L (ref 20–33)
CREAT SERPL-MCNC: 0.87 MG/DL (ref 0.5–1.4)
CRP SERPL HS-MCNC: <0.3 MG/DL (ref 0–0.75)
EOSINOPHIL # BLD AUTO: 0.05 K/UL (ref 0–0.51)
EOSINOPHIL NFR BLD: 0.5 % (ref 0–6.9)
ERYTHROCYTE [DISTWIDTH] IN BLOOD BY AUTOMATED COUNT: 45.4 FL (ref 35.9–50)
ERYTHROCYTE [SEDIMENTATION RATE] IN BLOOD BY WESTERGREN METHOD: 14 MM/HOUR (ref 0–25)
GFR SERPLBLD CREATININE-BSD FMLA CKD-EPI: 72 ML/MIN/1.73 M 2
GLOBULIN SER CALC-MCNC: 2.5 G/DL (ref 1.9–3.5)
GLUCOSE SERPL-MCNC: 101 MG/DL (ref 65–99)
HCT VFR BLD AUTO: 34.5 % (ref 37–47)
HGB BLD-MCNC: 11.4 G/DL (ref 12–16)
IMM GRANULOCYTES # BLD AUTO: 0.09 K/UL (ref 0–0.11)
IMM GRANULOCYTES NFR BLD AUTO: 0.9 % (ref 0–0.9)
LYMPHOCYTES # BLD AUTO: 1.22 K/UL (ref 1–4.8)
LYMPHOCYTES NFR BLD: 12.1 % (ref 22–41)
MAGNESIUM SERPL-MCNC: 1.7 MG/DL (ref 1.5–2.5)
MCH RBC QN AUTO: 30.3 PG (ref 27–33)
MCHC RBC AUTO-ENTMCNC: 33 G/DL (ref 33.6–35)
MCV RBC AUTO: 91.8 FL (ref 81.4–97.8)
MONOCYTES # BLD AUTO: 0.67 K/UL (ref 0–0.85)
MONOCYTES NFR BLD AUTO: 6.6 % (ref 0–13.4)
NEUTROPHILS # BLD AUTO: 8.03 K/UL (ref 2–7.15)
NEUTROPHILS NFR BLD: 79.4 % (ref 44–72)
NRBC # BLD AUTO: 0 K/UL
NRBC BLD-RTO: 0 /100 WBC
PATHOLOGY CONSULT NOTE: NORMAL
PLATELET # BLD AUTO: 178 K/UL (ref 164–446)
PMV BLD AUTO: 10.1 FL (ref 9–12.9)
POTASSIUM SERPL-SCNC: 3.6 MMOL/L (ref 3.6–5.5)
PROT SERPL-MCNC: 6.4 G/DL (ref 6–8.2)
RBC # BLD AUTO: 3.76 M/UL (ref 4.2–5.4)
SODIUM SERPL-SCNC: 139 MMOL/L (ref 135–145)
WBC # BLD AUTO: 10.1 K/UL (ref 4.8–10.8)

## 2023-02-08 PROCEDURE — 160048 HCHG OR STATISTICAL LEVEL 1-5: Performed by: INTERNAL MEDICINE

## 2023-02-08 PROCEDURE — 160208 HCHG ENDO MINUTES - EA ADDL 1 MIN LEVEL 4: Performed by: INTERNAL MEDICINE

## 2023-02-08 PROCEDURE — 85652 RBC SED RATE AUTOMATED: CPT

## 2023-02-08 PROCEDURE — 700102 HCHG RX REV CODE 250 W/ 637 OVERRIDE(OP): Performed by: STUDENT IN AN ORGANIZED HEALTH CARE EDUCATION/TRAINING PROGRAM

## 2023-02-08 PROCEDURE — 96374 THER/PROPH/DIAG INJ IV PUSH: CPT

## 2023-02-08 PROCEDURE — 700111 HCHG RX REV CODE 636 W/ 250 OVERRIDE (IP): Performed by: STUDENT IN AN ORGANIZED HEALTH CARE EDUCATION/TRAINING PROGRAM

## 2023-02-08 PROCEDURE — 80053 COMPREHEN METABOLIC PANEL: CPT

## 2023-02-08 PROCEDURE — 160203 HCHG ENDO MINUTES - 1ST 30 MINS LEVEL 4: Performed by: INTERNAL MEDICINE

## 2023-02-08 PROCEDURE — 700111 HCHG RX REV CODE 636 W/ 250 OVERRIDE (IP)

## 2023-02-08 PROCEDURE — 86140 C-REACTIVE PROTEIN: CPT

## 2023-02-08 PROCEDURE — 700111 HCHG RX REV CODE 636 W/ 250 OVERRIDE (IP): Performed by: ANESTHESIOLOGY

## 2023-02-08 PROCEDURE — 88312 SPECIAL STAINS GROUP 1: CPT

## 2023-02-08 PROCEDURE — 85025 COMPLETE CBC W/AUTO DIFF WBC: CPT

## 2023-02-08 PROCEDURE — C1726 CATH, BAL DIL, NON-VASCULAR: HCPCS | Performed by: INTERNAL MEDICINE

## 2023-02-08 PROCEDURE — G0378 HOSPITAL OBSERVATION PER HR: HCPCS

## 2023-02-08 PROCEDURE — 160009 HCHG ANES TIME/MIN: Performed by: INTERNAL MEDICINE

## 2023-02-08 PROCEDURE — 96375 TX/PRO/DX INJ NEW DRUG ADDON: CPT

## 2023-02-08 PROCEDURE — 36415 COLL VENOUS BLD VENIPUNCTURE: CPT

## 2023-02-08 PROCEDURE — 160002 HCHG RECOVERY MINUTES (STAT): Performed by: INTERNAL MEDICINE

## 2023-02-08 PROCEDURE — 96376 TX/PRO/DX INJ SAME DRUG ADON: CPT | Mod: XU

## 2023-02-08 PROCEDURE — A9270 NON-COVERED ITEM OR SERVICE: HCPCS | Performed by: STUDENT IN AN ORGANIZED HEALTH CARE EDUCATION/TRAINING PROGRAM

## 2023-02-08 PROCEDURE — 88305 TISSUE EXAM BY PATHOLOGIST: CPT

## 2023-02-08 PROCEDURE — 99236 HOSP IP/OBS SAME DATE HI 85: CPT | Mod: GC | Performed by: STUDENT IN AN ORGANIZED HEALTH CARE EDUCATION/TRAINING PROGRAM

## 2023-02-08 PROCEDURE — 83735 ASSAY OF MAGNESIUM: CPT

## 2023-02-08 PROCEDURE — 99221 1ST HOSP IP/OBS SF/LOW 40: CPT | Mod: 25 | Performed by: INTERNAL MEDICINE

## 2023-02-08 PROCEDURE — 43249 ESOPH EGD DILATION <30 MM: CPT | Mod: 59 | Performed by: INTERNAL MEDICINE

## 2023-02-08 PROCEDURE — 43239 EGD BIOPSY SINGLE/MULTIPLE: CPT | Performed by: INTERNAL MEDICINE

## 2023-02-08 PROCEDURE — 700101 HCHG RX REV CODE 250: Performed by: ANESTHESIOLOGY

## 2023-02-08 PROCEDURE — 700105 HCHG RX REV CODE 258

## 2023-02-08 PROCEDURE — 00731 ANES UPR GI NDSC PX NOS: CPT | Performed by: ANESTHESIOLOGY

## 2023-02-08 PROCEDURE — 160035 HCHG PACU - 1ST 60 MINS PHASE I: Performed by: INTERNAL MEDICINE

## 2023-02-08 RX ORDER — HYDROMORPHONE HYDROCHLORIDE 1 MG/ML
1 INJECTION, SOLUTION INTRAMUSCULAR; INTRAVENOUS; SUBCUTANEOUS EVERY 4 HOURS PRN
Status: DISCONTINUED | OUTPATIENT
Start: 2023-02-08 | End: 2023-02-08 | Stop reason: HOSPADM

## 2023-02-08 RX ORDER — POLYETHYLENE GLYCOL 3350 17 G/17G
1 POWDER, FOR SOLUTION ORAL
Status: DISCONTINUED | OUTPATIENT
Start: 2023-02-08 | End: 2023-02-08 | Stop reason: HOSPADM

## 2023-02-08 RX ORDER — FLUCONAZOLE 200 MG/1
200 TABLET ORAL DAILY
Status: DISCONTINUED | OUTPATIENT
Start: 2023-02-08 | End: 2023-02-08 | Stop reason: HOSPADM

## 2023-02-08 RX ORDER — OXYCODONE HYDROCHLORIDE 10 MG/1
10 TABLET ORAL EVERY 6 HOURS PRN
Qty: 12 TABLET | Refills: 0 | Status: SHIPPED | OUTPATIENT
Start: 2023-02-08 | End: 2023-02-08

## 2023-02-08 RX ORDER — DIAZEPAM 5 MG/ML
5 INJECTION, SOLUTION INTRAMUSCULAR; INTRAVENOUS EVERY 6 HOURS PRN
Status: DISCONTINUED | OUTPATIENT
Start: 2023-02-08 | End: 2023-02-08 | Stop reason: HOSPADM

## 2023-02-08 RX ORDER — ONDANSETRON 2 MG/ML
4 INJECTION INTRAMUSCULAR; INTRAVENOUS EVERY 4 HOURS PRN
Status: DISCONTINUED | OUTPATIENT
Start: 2023-02-08 | End: 2023-02-08 | Stop reason: HOSPADM

## 2023-02-08 RX ORDER — MORPHINE SULFATE 4 MG/ML
2 INJECTION INTRAVENOUS
Status: DISCONTINUED | OUTPATIENT
Start: 2023-02-08 | End: 2023-02-08

## 2023-02-08 RX ORDER — SODIUM CHLORIDE, SODIUM LACTATE, POTASSIUM CHLORIDE, CALCIUM CHLORIDE 600; 310; 30; 20 MG/100ML; MG/100ML; MG/100ML; MG/100ML
INJECTION, SOLUTION INTRAVENOUS CONTINUOUS
Status: DISCONTINUED | OUTPATIENT
Start: 2023-02-08 | End: 2023-02-08 | Stop reason: HOSPADM

## 2023-02-08 RX ORDER — HYDROMORPHONE HYDROCHLORIDE 1 MG/ML
0.5 INJECTION, SOLUTION INTRAMUSCULAR; INTRAVENOUS; SUBCUTANEOUS ONCE
Status: COMPLETED | OUTPATIENT
Start: 2023-02-08 | End: 2023-02-08

## 2023-02-08 RX ORDER — ACETAMINOPHEN 325 MG/1
650 TABLET ORAL EVERY 6 HOURS PRN
Status: DISCONTINUED | OUTPATIENT
Start: 2023-02-08 | End: 2023-02-08 | Stop reason: HOSPADM

## 2023-02-08 RX ORDER — ONDANSETRON 2 MG/ML
4 INJECTION INTRAMUSCULAR; INTRAVENOUS ONCE
Status: COMPLETED | OUTPATIENT
Start: 2023-02-08 | End: 2023-02-08

## 2023-02-08 RX ORDER — OXYCODONE HYDROCHLORIDE 10 MG/1
10 TABLET ORAL EVERY 6 HOURS PRN
Status: DISCONTINUED | OUTPATIENT
Start: 2023-02-08 | End: 2023-02-08 | Stop reason: HOSPADM

## 2023-02-08 RX ORDER — AMOXICILLIN 250 MG
2 CAPSULE ORAL 2 TIMES DAILY
Status: DISCONTINUED | OUTPATIENT
Start: 2023-02-08 | End: 2023-02-08 | Stop reason: HOSPADM

## 2023-02-08 RX ORDER — METOPROLOL TARTRATE 1 MG/ML
5 INJECTION, SOLUTION INTRAVENOUS
Status: DISCONTINUED | OUTPATIENT
Start: 2023-02-08 | End: 2023-02-08 | Stop reason: HOSPADM

## 2023-02-08 RX ORDER — FLUCONAZOLE 200 MG/1
200 TABLET ORAL DAILY
Qty: 21 TABLET | Refills: 0 | Status: SHIPPED | OUTPATIENT
Start: 2023-02-08 | End: 2023-02-08 | Stop reason: SDUPTHER

## 2023-02-08 RX ORDER — BISACODYL 10 MG
10 SUPPOSITORY, RECTAL RECTAL
Status: DISCONTINUED | OUTPATIENT
Start: 2023-02-08 | End: 2023-02-08 | Stop reason: HOSPADM

## 2023-02-08 RX ORDER — LIDOCAINE HYDROCHLORIDE 20 MG/ML
INJECTION, SOLUTION EPIDURAL; INFILTRATION; INTRACAUDAL; PERINEURAL PRN
Status: DISCONTINUED | OUTPATIENT
Start: 2023-02-08 | End: 2023-02-08 | Stop reason: SURG

## 2023-02-08 RX ORDER — FLUCONAZOLE 200 MG/1
200 TABLET ORAL DAILY
Qty: 21 TABLET | Refills: 0 | Status: ACTIVE | OUTPATIENT
Start: 2023-02-08 | End: 2023-03-01

## 2023-02-08 RX ADMIN — SODIUM CHLORIDE, POTASSIUM CHLORIDE, SODIUM LACTATE AND CALCIUM CHLORIDE: 600; 310; 30; 20 INJECTION, SOLUTION INTRAVENOUS at 02:33

## 2023-02-08 RX ADMIN — ONDANSETRON 4 MG: 2 INJECTION INTRAMUSCULAR; INTRAVENOUS at 15:46

## 2023-02-08 RX ADMIN — ONDANSETRON 4 MG: 2 INJECTION INTRAMUSCULAR; INTRAVENOUS at 12:31

## 2023-02-08 RX ADMIN — HYDROMORPHONE HYDROCHLORIDE 1 MG: 1 INJECTION, SOLUTION INTRAMUSCULAR; INTRAVENOUS; SUBCUTANEOUS at 15:46

## 2023-02-08 RX ADMIN — DIAZEPAM 5 MG: 5 INJECTION, SOLUTION INTRAMUSCULAR; INTRAVENOUS at 02:33

## 2023-02-08 RX ADMIN — SUGAMMADEX 200 MG: 100 INJECTION, SOLUTION INTRAVENOUS at 14:19

## 2023-02-08 RX ADMIN — HYDROMORPHONE HYDROCHLORIDE 1 MG: 1 INJECTION, SOLUTION INTRAMUSCULAR; INTRAVENOUS; SUBCUTANEOUS at 08:29

## 2023-02-08 RX ADMIN — ONDANSETRON 4 MG: 2 INJECTION INTRAMUSCULAR; INTRAVENOUS at 07:52

## 2023-02-08 RX ADMIN — DIAZEPAM 5 MG: 5 INJECTION, SOLUTION INTRAMUSCULAR; INTRAVENOUS at 11:04

## 2023-02-08 RX ADMIN — FLUCONAZOLE 200 MG: 200 TABLET ORAL at 16:18

## 2023-02-08 RX ADMIN — HYDROMORPHONE HYDROCHLORIDE 0.5 MG: 1 INJECTION, SOLUTION INTRAMUSCULAR; INTRAVENOUS; SUBCUTANEOUS at 04:39

## 2023-02-08 RX ADMIN — PROPOFOL 150 MG: 10 INJECTION, EMULSION INTRAVENOUS at 13:57

## 2023-02-08 RX ADMIN — LIDOCAINE HYDROCHLORIDE 100 MG: 20 INJECTION, SOLUTION EPIDURAL; INFILTRATION; INTRACAUDAL at 13:57

## 2023-02-08 RX ADMIN — HYDROMORPHONE HYDROCHLORIDE 0.5 MG: 1 INJECTION, SOLUTION INTRAMUSCULAR; INTRAVENOUS; SUBCUTANEOUS at 12:32

## 2023-02-08 RX ADMIN — ROCURONIUM BROMIDE 40 MG: 10 INJECTION, SOLUTION INTRAVENOUS at 13:57

## 2023-02-08 ASSESSMENT — COGNITIVE AND FUNCTIONAL STATUS - GENERAL
DAILY ACTIVITIY SCORE: 24
MOBILITY SCORE: 23
CLIMB 3 TO 5 STEPS WITH RAILING: A LITTLE
SUGGESTED CMS G CODE MODIFIER MOBILITY: CI
SUGGESTED CMS G CODE MODIFIER DAILY ACTIVITY: CH

## 2023-02-08 ASSESSMENT — LIFESTYLE VARIABLES
ON A TYPICAL DAY WHEN YOU DRINK ALCOHOL HOW MANY DRINKS DO YOU HAVE: 0
ALCOHOL_USE: NO
AVERAGE NUMBER OF DAYS PER WEEK YOU HAVE A DRINK CONTAINING ALCOHOL: 0
HAVE PEOPLE ANNOYED YOU BY CRITICIZING YOUR DRINKING: NO
CONSUMPTION TOTAL: NEGATIVE
EVER FELT BAD OR GUILTY ABOUT YOUR DRINKING: NO
HAVE YOU EVER FELT YOU SHOULD CUT DOWN ON YOUR DRINKING: NO
DOES PATIENT WANT TO STOP DRINKING: CANNOT ASSESS
TOTAL SCORE: 0
TOTAL SCORE: 0
HOW MANY TIMES IN THE PAST YEAR HAVE YOU HAD 5 OR MORE DRINKS IN A DAY: 0
TOTAL SCORE: 0
EVER HAD A DRINK FIRST THING IN THE MORNING TO STEADY YOUR NERVES TO GET RID OF A HANGOVER: NO

## 2023-02-08 ASSESSMENT — ENCOUNTER SYMPTOMS
SPUTUM PRODUCTION: 1
WEIGHT LOSS: 1
VOMITING: 1
CHILLS: 0
PALPITATIONS: 1
SHORTNESS OF BREATH: 0
NAUSEA: 1
FEVER: 0
WHEEZING: 0

## 2023-02-08 ASSESSMENT — PAIN DESCRIPTION - PAIN TYPE
TYPE: SURGICAL PAIN
TYPE: ACUTE PAIN
TYPE: CHRONIC PAIN
TYPE: SURGICAL PAIN

## 2023-02-08 ASSESSMENT — PAIN SCALES - GENERAL: PAIN_LEVEL: 0

## 2023-02-08 ASSESSMENT — FIBROSIS 4 INDEX: FIB4 SCORE: 1.42

## 2023-02-08 NOTE — ED NOTES
Pt ambulated to RD [address]  with a steady gait. C/C is Difficulty Swallowing.  Pt is in a gown, on the monitor and call light within reach. Chart up for ERP. Family at bedside.

## 2023-02-08 NOTE — ED NOTES
"Med Rec complete per Pt at bedside. Pt reports she's been taking her medications but have \"unable to keep them down.\"  Home Pharm[facility]:  [facility]  "

## 2023-02-08 NOTE — ED NOTES
Bedside report to CAMERON Hester. All questions answered. Pt awake and breathing with even, unlabored respirations on RA at time of report.

## 2023-02-08 NOTE — OR NURSING
1230 Phone call to Dr Marley notifying of patient's pain and nausea. Orders received for zofran and dilaudid iv- see MAR. Cool cloth and queaze eaze applied for comfort.     1250 Pt reports nausea and pain are much better at this time.

## 2023-02-08 NOTE — CONSULTS
Date of Consultation:  2/8/2023    Patient: : Jaan Overton  MRN: [id number]    Referring Physician:  Dr. Alexandria Gee     GI:Jamarcus Davalos M.D.     Reason for Consultation: Dysphagia     History of Present Illness:   The patient is a 67 years old female with a medical history of rheumatoid disease, back pain, Crohn's s/p resection of a bowel surgery, no regular GI doctor, not on any anti-Crohn's disease, presented with a recurrent dysphagia.    GI team scoped the patient in Oct and Jan recently. S/p dilatation. Seems helping, but could not provider sustainable help.     This time, saw the patient at bedside, similar presentation. She also mentioned small amount of blood in the vomitus. No other lower GI S/S.       Past Medical History:   Diagnosis Date    Anesthesia     Difficult IV stick    Anxiety     Arthritis     all over    ASTHMA     Atrial fib/flut     Backpain     Bronchitis     5 years    Chronic pain     Colostomy in place (HCC)     COPD (chronic obstructive pulmonary disease) ([facility])     Crohn's disease of colon ([facility])     Dyspnea     History of cardiac murmur     Ileostomy present ([facility])     Infectious disease     MRSA, VancoRSA    Multiple falls     Narcotic dependence ([facility])     Obstruction     Pain     Pneumonia     child and mid 30's    Postherpetic neuralgia     Rosacea     Sleep apnea          Past Surgical History:   Procedure Laterality Date    IA UPPER GI ENDOSCOPY,DIAGNOSIS N/A 1/16/2023    Procedure: GASTROSCOPY;  Surgeon: Jamarcus Davalos M.D.;  Location: SURGERY SAME DAY [facility];  Service: Gastroenterology    IA UPPER GI ENDOSCOPY,W/DILAT,GASTRIC OUT N/A 1/16/2023    Procedure: GASTROSCOPY, WITH BALLOON DILATION;  Surgeon: Jamarcus Davalos M.D.;  Location: SURGERY SAME DAY [facility];  Service: Gastroenterology    IA UPPER GI ENDOSCOPY,BIOPSY N/A 1/16/2023    Procedure: GASTROSCOPY, WITH BIOPSY;  Surgeon: Jamarcus Davalos M.D.;  Location: SURGERY SAME DAY [facility];  Service: Gastroenterology    IA UPPER GI  ENDOSCOPY,DIAGNOSIS N/A 10/24/2022    Procedure: GASTROSCOPY;  Surgeon: Jamarcus Davalos M.D.;  Location: SURGERY SAME DAY [facility];  Service: Gastroenterology    BILIARY DILATATION N/A 10/24/2022    Procedure: DILATION, STRICTURE, BILE DUCT;  Surgeon: Jamarcus Davalos M.D.;  Location: SURGERY SAME DAY [facility];  Service: Gastroenterology    DE CYSTOSCOPY,INSERT URETERAL STENT Right 12/23/2021    Procedure: CYSTOSCOPY, WITH URETERAL STENT EXCHANGE;  Surgeon: Jonathan Turcios M.D.;  Location: [facility];  Service: Urology    DE CYSTO/URETERO/PYELOSCOPY, DX Right 12/23/2021    Procedure: URETEROSCOPY;  Surgeon: Jonathan Turcios M.D.;  Location: [facility];  Service: Urology    DE CYSTO/URETERO/PYELOSCOPY, DX Right 12/8/2021    Procedure: URETEROSCOPY;  Surgeon: Luc Hook M.D.;  Location: [facility];  Service: Urology    CYSTO STENT PLACEMNT PRE SURG Right 12/8/2021    Procedure: CYSTOSCOPY, WITH URETERAL STENT INSERTION;  Surgeon: Lcu Hook M.D.;  Location: [facility];  Service: Urology    ILEOSTOMY  1/20/2021    Procedure: ILEOSTOMY- COMPLEX REVISION,;  Surgeon: Kevin Cruz M.D.;  Location: [facility];  Service: General    LYSIS ADHESIONS GENERAL  1/20/2021    Procedure: LYSIS, ADHESIONS;  Surgeon: Kevin Cruz M.D.;  Location: [facility];  Service: General    GASTROSCOPY N/A 5/22/2019    Procedure: GASTROSCOPY - WITH DILATION;  Surgeon: Dionicio Cardona M.D.;  Location: [facility];  Service: Gastroenterology    GASTROSCOPY  1/6/2019    Procedure: GASTROSCOPY- WITH DILATION;  Surgeon: Daniel Daniels M.D.;  Location: [facility];  Service: Gastroenterology    ILEOSTOMY  12/29/2018    Procedure: ILEOSTOMY- REVISION;  Surgeon: Kevin Cruz M.D.;  Location: [facility];  Service: General    SIGMOIDOSCOPY FLEX  12/29/2018    Procedure: SIGMOIDOSCOPY FLEX;  Surgeon: Kevin Cruz M.D.;  Location: SURGERY [facility];  Service: General    EXPLORATORY LAPAROTOMY  12/29/2018    Procedure: EXPLORATORY LAPAROTOMY, lysis of adhesions;  Surgeon: Kevin Cruz M.D.;  Location: SURGERY [facility];  Service: General    ILEOSTOMY  5/14/2014    Performed by Kevin rCuz M.D. at SURGERY [facility]    MAMMOPLASTY REDUCTION  7/17/2013    Performed by Janey Burt M.D. at SURGERY [facility]    HARDWARE REMOVAL NEURO  7/16/2012    Performed by KEELEY KIM at SURGERY [facility]    GASTROSCOPY  10/4/2011    Performed by TYSON MARTINEZ at SURGERY SAME DAY [facility]    COLONOSCOPY  10/4/2011    Performed by TYSON MARTINEZ at SURGERY SAME DAY [facility]    DILATION AND CURETTAGE  9/24/2010    Performed by GAURAV ALY at SURGERY SAME DAY [facility]    ILEOSTOMY  11/11/2009    Performed by KEVIN CRUZ at SURGERY [facility]    GASTROSCOPY WITH BIOPSY  11/22/08    Performed by NEGRITA HUYNH at SURGERY [facility]    ABDOMINAL EXPLORATION      CERVICAL DISK AND FUSION ANTERIOR      COLON RESECTION      FOOT SURGERY      HAND SURGERY      LUMBAR FUSION ANTERIOR      LUMPECTOMY      OTHER ABDOMINAL SURGERY      surgery for chrons disease    [place] BREAST REDUCTION         Family History   Problem Relation Age of Onset    Lung Disease Mother         copd    Diabetes Father     Heart Disease Father     Diabetes Sister     Cancer Maternal Aunt 40        breast       Constitutional: Denies fevers.  Eyes: Denies symptoms.   Ears/Nose/Throat/Mouth: Denies choking.  Cardiovascular: Denies chest pain.   Respiratory: Denies shortness of breath.  Gastrointestinal/Hepatic: Per H/P.         HEENT: grossly normal.  Cardiovascular: Normal heart rate.  Lungs: Respiratory effort is normal.   Abdomen: Soft, No tenderness.  Neurologic: Alert & oriented x 3, Normal motor function, No focal deficits noted.  PSY: stable mood.       Social History     Socioeconomic History    Marital status:      Highest education level: Associate degree: academic program   Tobacco Use    Smoking status: Never    Smokeless tobacco: Never    Tobacco comments:     second hand smoke parents - smoked for only 1 year many years ago   Vaping Use    Vaping Use: Every day    Substances: THC   Substance and Sexual Activity    Alcohol use: Not Currently     Alcohol/week: 0.6 oz     Types: 1 Shots of liquor per week     Comment: gin and half a lime; tonic water    Drug use: Yes     Types: Marijuana, Inhaled     Comment: medical marijuana through bong/vape     Social Determinants of Health     Financial Resource Strain: Unknown    Difficulty of Paying Living Expenses: Patient refused   Food Insecurity: Unknown    Worried About Running Out of Food in the Last Year: Patient refused    Ran Out of Food in the Last Year: Patient refused   Transportation Needs: Unknown    Lack of Transportation (Medical): Patient refused    Lack of Transportation (Non-Medical): Patient refused   Physical Activity: Sufficiently Active    Days of Exercise per Week: 7 days    Minutes of Exercise per Session: 60 min   Social Connections: Socially Integrated    Frequency of Communication with Friends and Family: Three times a week    Frequency of Social Gatherings with Friends and Family: Once a week    Attends [employer]ism Services: More than 4 times per year    Active Member of Clubs or Organizations: Yes    Attends Club or Organization Meetings: More than 4 times per year    Marital Status:    Housing Stability: Unknown    Unable to Pay for Housing in the Last Year: Patient refused    Number of Places Lived in the Last Year: 1    Unstable Housing in the Last Year: No           Physical Exam:  Vitals:    02/08/23 0445 02/08/23 0800 02/08/23 0829 02/08/23 1122   BP: (!) 141/59 139/60  (!) 162/70   Pulse: 72 82  77   Resp: 18 18 18 18   Temp: 36.3 °C (97.4 °F) 36.5 °C (97.7 °F)  36.6 °C (97.8 °F)   TempSrc: Temporal Temporal  Temporal   SpO2: 99% 98%  92%    Weight:       Height:                 Labs:  Recent Labs     02/07/23 2140 02/08/23  0613   WBC 12.3* 10.1   RBC 4.09* 3.76*   HEMOGLOBIN 12.3 11.4*   HEMATOCRIT 38.6 34.5*   MCV 94.4 91.8   MCH 30.1 30.3   MCHC 31.9* 33.0*   RDW 46.1 45.4   PLATELETCT 233 178   MPV 10.1 10.1     Recent Labs     02/07/23 2140 02/08/23  0613   SODIUM 138 139   POTASSIUM 4.1 3.6   CHLORIDE 101 104   CO2 26 26   GLUCOSE 98 101*   BUN 33* 25*           Recent Labs     02/07/23 2140 02/08/23  0613   ASTSGOT 18 19   ALTSGPT 14 12   TBILIRUBIN 0.2 0.6   ALKPHOSPHAT 99 78   GLOBULIN 3.1 2.5         Imaging:  DX-UPPER GI-SERIES WITH KUB  Addendum: Fluoroscopy dose(DAP): 8.03 Gy*cm^2  Narrative: 1/15/2023 4:05 PM    HISTORY/REASON FOR EXAM:  Gastrointestinal Complaint; Per GI Dr. Mike request barium swallow with tablet, history of strictures with complaints of vomiting.  History: Difficulty swallowing, vomiting, history Chron's Disease    TECHNIQUE/EXAM DESCRIPTION AND NUMBER OF VIEWS:  Single contrast barium upper GI series was performed.    6 fluoroscopic images obtained.  4 overhead image obtained.    Fluoroscopy time: 0.7 minutes    COMPARISON: 9/27/2022.    FINDINGS:     radiograph demonstrates nonobstructive bowel gas pattern.    Diffuse narrowing in the distal esophagus with 2 focal severe strictures. Very minimal contrast passed to the stomach. Mildly prominent appearance of the mid and proximal esophagus.    Moderate esophageal dysmotility.  Impression: Diffuse narrowing in the distal esophagus with 2 focal severe strictures. Significant holdup of contrast in the proximal esophagus with very minimal contrast passed to the stomach.    CRITICAL RESULT READ BACK: Preliminary findings discussed with and critical read back performed by Dr. MICK CHAVIS  via telephone on 1/15/2023 4:53 PM            Impressions:  67 years old female with a history of Crohn's disease not on anti-Crohn disease meds, recurrent dysphagia s/p  recent dilatation by the GI team.  Presented to GI team again for recurrent swallow symptom.     We saw the patient at bedside, consistent with recurrent dysphagia.    Plan to have EGD with dilatation today.    Diagnosis: Dysphagia.   Procedure: Esophagogastroduodenoscopy with dilatation.         This note was generated using voice recognition software which has a small chance of producing errors of grammar and possibly content. I have made every reasonable attempt to find and correct any obvious errors, but expect that some may not be found prior to finalization of this note.

## 2023-02-08 NOTE — ED PROVIDER NOTES
ER Provider Note    Scribed for Luc Santos M.d. by Mike Arias. 2/7/2023  9:16 PM    Primary Care Provider: Chase Charles D.O.    CHIEF COMPLAINT  Chief Complaint   Patient presents with    Difficulty Swallowing     Patient ambulates to triage c/o difficulties swallowing and spasms to throat, hx of strictures to throat. Patient has extensive medical history, was instructed to come to ER by PCP and GI. Patient reports being instructed to come to ed due to potential of choking. Also reports n/v     LIMITATION TO HISTORY   Select: : None    HPI/ROS    EXTERNAL RECORDS REVIEWED  Inpatient Notes ED [facility] [place] - 01/15/2023 - Patient was admitted for an esophageal dilation procedure.    Jana Overton is a 67 y.o. female with a history of Crohn's Disease who presents to the ED for difficulty swallowing onset today. The patient states that she was recently admitted for an esophageal dilation procedure and was told by her physician that she will likely need to have more future dilation procedures. She also reports that she was prompted to come to the ED by her PCP and GI doctor due to the potential for choking. She also experiences vomiting, leg cramping, chronic pain, and nausea. She states that her chronic pain is unchanged. She denies any blood in her vomit. She states that she is unable to keep down any solids and can only swallow purees. Patient denies diarrhea. She reports having normal bowel movements. No alleviating factors reported.    PAST MEDICAL HISTORY  Past Medical History:   Diagnosis Date    Anesthesia     Difficult IV stick    Anxiety     Arthritis     all over    ASTHMA     Atrial fib/flut     Backpain     Bronchitis     5 years    Chronic pain     Colostomy in place (HCC)     COPD (chronic obstructive pulmonary disease) (HCC)     Crohn's disease of colon (HCC)     Dyspnea     History of cardiac murmur     Ileostomy present ([facility])     Infectious disease     MRSA, VancoRSA    Multiple  falls     Narcotic dependence (HCC)     Obstruction     Pain     Pneumonia     child and mid 30's    Postherpetic neuralgia     Rosacea     Sleep apnea        SURGICAL HISTORY  Past Surgical History:   Procedure Laterality Date    UT UPPER GI ENDOSCOPY,DIAGNOSIS N/A 1/16/2023    Procedure: GASTROSCOPY;  Surgeon: Jamarcus Davalos M.D.;  Location: SURGERY SAME DAY [facility];  Service: Gastroenterology    UT UPPER GI ENDOSCOPY,W/DILAT,GASTRIC OUT N/A 1/16/2023    Procedure: GASTROSCOPY, WITH BALLOON DILATION;  Surgeon: Jamarcus Davalos M.D.;  Location: SURGERY SAME DAY [facility];  Service: Gastroenterology    UT UPPER GI ENDOSCOPY,BIOPSY N/A 1/16/2023    Procedure: GASTROSCOPY, WITH BIOPSY;  Surgeon: Jamarcus Davalos M.D.;  Location: SURGERY SAME DAY [facility];  Service: Gastroenterology    UT UPPER GI ENDOSCOPY,DIAGNOSIS N/A 10/24/2022    Procedure: GASTROSCOPY;  Surgeon: Jamarcus Davalos M.D.;  Location: SURGERY SAME DAY [facility];  Service: Gastroenterology    BILIARY DILATATION N/A 10/24/2022    Procedure: DILATION, STRICTURE, BILE DUCT;  Surgeon: Jamarcus Davalos M.D.;  Location: SURGERY SAME DAY [facility];  Service: Gastroenterology    UT CYSTOSCOPY,INSERT URETERAL STENT Right 12/23/2021    Procedure: CYSTOSCOPY, WITH URETERAL STENT EXCHANGE;  Surgeon: Jonathan Turcios M.D.;  Location: [facility];  Service: Urology    UT CYSTO/URETERO/PYELOSCOPY, DX Right 12/23/2021    Procedure: URETEROSCOPY;  Surgeon: Jonathan Turcios M.D.;  Location: [facility];  Service: Urology    UT CYSTO/URETERO/PYELOSCOPY, DX Right 12/8/2021    Procedure: URETEROSCOPY;  Surgeon: Luc Hook M.D.;  Location: [facility];  Service: Urology    CYSTO STENT PLACEMNT PRE SURG Right 12/8/2021    Procedure: CYSTOSCOPY, WITH URETERAL STENT INSERTION;  Surgeon: Luc Hook M.D.;  Location: [facility];  Service: Urology    ILEOSTOMY  1/20/2021    Procedure: ILEOSTOMY- COMPLEX REVISION,;  Surgeon: Kevin Cruz M.D.;   Location: SURGERY [facility];  Service: General    LYSIS ADHESIONS GENERAL  1/20/2021    Procedure: LYSIS, ADHESIONS;  Surgeon: Kevin Cruz M.D.;  Location: SURGERY [facility];  Service: General    GASTROSCOPY N/A 5/22/2019    Procedure: GASTROSCOPY - WITH DILATION;  Surgeon: Dionicio Cardona M.D.;  Location: SURGERY [facility];  Service: Gastroenterology    GASTROSCOPY  1/6/2019    Procedure: GASTROSCOPY- WITH DILATION;  Surgeon: Daniel Daniels M.D.;  Location: SURGERY [facility];  Service: Gastroenterology    ILEOSTOMY  12/29/2018    Procedure: ILEOSTOMY- REVISION;  Surgeon: Kevin Cruz M.D.;  Location: SURGERY [facility];  Service: General    SIGMOIDOSCOPY FLEX  12/29/2018    Procedure: SIGMOIDOSCOPY FLEX;  Surgeon: Kevin Cruz M.D.;  Location: SURGERY [facility];  Service: General    EXPLORATORY LAPAROTOMY  12/29/2018    Procedure: EXPLORATORY LAPAROTOMY, lysis of adhesions;  Surgeon: Kevin Cruz M.D.;  Location: SURGERY [facility];  Service: General    ILEOSTOMY  5/14/2014    Performed by Kevin Cruz M.D. at SURGERY [facility] ORS    MAMMOPLASTY REDUCTION  7/17/2013    Performed by Janey Burt M.D. at SURGERY [facility] ORS    HARDWARE REMOVAL NEURO  7/16/2012    Performed by KEELEY KIM at SURGERY [facility] ORS    GASTROSCOPY  10/4/2011    Performed by TYSON MARTINEZ at SURGERY SAME DAY [facility] ORS    COLONOSCOPY  10/4/2011    Performed by TYSON MARTINEZ at SURGERY SAME DAY [facility] ORS    DILATION AND CURETTAGE  9/24/2010    Performed by GAURAV ALY at SURGERY SAME DAY [facility] ORS    ILEOSTOMY  11/11/2009    Performed by KEVIN CRUZ at SURGERY [facility] ORS    GASTROSCOPY WITH BIOPSY  11/22/08    Performed by NEGRITA HUYNH at [facility]    ABDOMINAL EXPLORATION      CERVICAL DISK AND FUSION ANTERIOR      COLON RESECTION      FOOT SURGERY      HAND SURGERY      LUMBAR FUSION ANTERIOR      LUMPECTOMY      OTHER  ABDOMINAL SURGERY      surgery for chrons disease    MN BREAST REDUCTION         FAMILY HISTORY  Family History   Problem Relation Age of Onset    Lung Disease Mother         copd    Diabetes Father     Heart Disease Father     Diabetes Sister     Cancer Maternal Aunt 40        breast       SOCIAL HISTORY   reports that she has never smoked. She has never used smokeless tobacco. She reports that she does not currently use alcohol after a past usage of about 0.6 oz per week. She reports current drug use. Drugs: Marijuana and Inhaled.    CURRENT MEDICATIONS  Previous Medications    AMLODIPINE (NORVASC) 2.5 MG TAB    Take 1 Tablet by mouth every day.    AMOXICILLIN-CLAVULANATE (AUGMENTIN) 875-125 MG TAB    Take 1 Tablet by mouth 2 times a day.    ASCORBIC ACID (VITAMIN C PO)    Take 1 Tablet by mouth 2 times a day.    AZELAIC ACID 15 % GEL    Apply 1 Application topically every day. Apply to face.    BIOTIN W/ VITAMINS C & E (HAIR SKIN & NAILS GUMMIES PO)    Take 2 Tablets by mouth every morning.    DIAZEPAM (VALIUM) 5 MG TAB    diazepam 5 mg tablet   TAKE 1 TABLET BY MOUTH EVERY 6 HOURS AS NEEDED FOR MUSCLE SPASM    FUROSEMIDE (LASIX) 20 MG TAB    Take 1 Tablet by mouth 1 time a day as needed (leg swelling/weight gain).    GRANISETRON (KYTRIL) 1 MG TAB    Take 1 Tablet by mouth every 12 hours as needed for Nausea/Vomiting.    HYDROXYCHLOROQUINE (PLAQUENIL) 200 MG TAB    Take 1.5 Tablets by mouth every day.    LEVALBUTEROL (XOPENEX HFA) 45 MCG/ACT INHALER    Inhale 2 Puffs every four hours as needed for Shortness of Breath (As needed for shortness of breath, cough, wheezing.).    MAALOX PLUS - DIPHENHYDRAMINE - LIDOCAINE (MBX ORAL SOLUTION)    10 ml po q6 hours prn.  Dysphagia /esophagitis    METHYLPREDNISOLONE (MEDROL DOSEPAK) 4 MG TABLET THERAPY PACK    As directed on the packaging label.    METOPROLOL TARTRATE (LOPRESSOR) 50 MG TAB    Take 1 Tablet by mouth 2 times a day.    MULTIPLE VITAMINS-MINERALS (CENTRUM  SILVER 50+WOMEN) TAB    Take 2 Tablets by mouth every morning. GUMMIES.    NARATRIPTAN (AMERGE) 2.5 MG TABLET    Take 1 Tablet by mouth as needed for Migraine.    NYSTATIN (MYCOSTATIN) 507472 UNIT/ML SUSPENSION    Take 5 mL by mouth 4 times a day.    ONDANSETRON (ZOFRAN ODT) 4 MG TABLET DISPERSIBLE    Take 1 Tablet by mouth every 6 hours as needed for Nausea/Vomiting.    OXYCODONE IMMEDIATE RELEASE (ROXICODONE) 10 MG IMMEDIATE RELEASE TABLET    Take 1 Tablet by mouth every 6 hours as needed for Severe Pain for up to 30 days.    POTASSIUM CHLORIDE SA (KDUR) 20 MEQ TAB CR    Take 1 Tablet by mouth 1 time a day as needed (when you take lasix.).    PROCHLORPERAZINE (COMPAZINE) 10 MG TAB    TAKE ONE TABLET BY MOUTH ONE TIME DAY AS NEEDED FOR NAUSEA AND VOMITING    SPIRONOLACTONE (ALDACTONE) 25 MG TAB    Take 25 mg by mouth every day.    TRAZODONE (DESYREL) 50 MG TAB    TAKE ONE TABLET BY MOUTH EVERY EVENING    ZINC PICOLINATE PO    Take 1 Tablet by mouth 2 times a day.       ALLERGIES  Demerol hcl [meperidine], Methotrexate, Morphine, Promethazine, Remicade [infliximab], Sudafed [pseudoephedrine], Azathioprine sodium, Carafate [sucralfate], Other drug, Sulfa drugs, Sulfasalazine, Amitriptyline, Ativan [lorazepam], Betamethasone, Fentanyl, Lyrica [pregabalin], Methadone, Potassium, Tizanidine, and Toradol [ketorolac tromethamine]    PHYSICAL EXAM  /85   Pulse 82   Temp 36.4 °C (97.5 °F) (Temporal)   Resp 14   Ht 1.829 m (6')   Wt 67.9 kg (149 lb 11.1 oz)   SpO2 92%   BMI 20.30 kg/m²     Constitutional: Alert in no apparent distress.  HENT: No signs of trauma, Bilateral external ears normal, Nose normal. Dry mucus membranes.  Eyes: Pupils are equal and reactive, Conjunctiva normal, Non-icteric.   Neck: Normal range of motion, No tenderness, Supple, No stridor.   Lymphatic: No lymphadenopathy noted.   Cardiovascular: Regular rate and rhythm, no murmurs.   Thorax & Lungs: Normal breath sounds, No respiratory  distress, No wheezing, No chest tenderness.   Abdomen: Bowel sounds normal, Soft, Mild diffused tenderness, No masses, No pulsatile masses. No peritoneal signs.  Skin: Warm, Dry, No erythema, No rash.   Back: No bony tenderness, No CVA tenderness.   Extremities: Intact distal pulses, No edema, No tenderness, No cyanosis.  Musculoskeletal: Good range of motion in all major joints.   Neurologic: Alert , Normal motor function, Normal sensory function, No focal deficits noted.   Psychiatric: Affect normal, Judgment normal, Mood normal.     DIAGNOSTIC STUDIES & PROCEDURES    Labs:   Labs Reviewed   CBC WITH DIFFERENTIAL - Abnormal; Notable for the following components:       Result Value    WBC 12.3 (*)     RBC 4.09 (*)     MCHC 31.9 (*)     Neutrophils-Polys 82.00 (*)     Lymphocytes 10.10 (*)     Neutrophils (Absolute) 10.10 (*)     All other components within normal limits   COMP METABOLIC PANEL - Abnormal; Notable for the following components:    Bun 33 (*)     All other components within normal limits   LIPASE - Abnormal; Notable for the following components:    Lipase 98 (*)     All other components within normal limits   ESTIMATED GFR - Abnormal; Notable for the following components:    GFR (CKD-EPI) 51 (*)     All other components within normal limits   CORRECTED CALCIUM   CBC WITH DIFFERENTIAL   COMP METABOLIC PANEL   MAGNESIUM     All labs reviewed by me.    COURSE & MEDICAL DECISION MAKING    ED Observation Status? Yes; I am placing the patient in to an observation status due to a diagnostic uncertainty as well as therapeutic intensity. Patient placed in observation status at 9:18 PM, 2/7/2023.     Observation plan is as follows: See abdominal exams.    Upon Reevaluation, the patient's condition has: Not improved and will need to be admitted.    Patient discharged from ED Observation status at 1140p (Time) 2/7 (Date).     INITIAL ASSESSMENT AND PLAN  Care Narrative: Patient presents with difficulty swallowing.   The patient need to be dilated.  I spoke to GI and they will do this.  Patient has no pancreatitis.  The patient has no significant electrolyte derangements.  No significant anemia.  The patient admitted to the hospital for dilation and observation.      9:16 PM - Patient seen and evaluated at bedside. Patient will be treated with Dilaudid 1 mg, Zofran 4 mg, and IV fluids for her symptoms. Ordered CBC w diff, CMP, and LIpase to evaluate. She understands and agrees to the plan of care.    11:10 PM - Paged GI.    11:15 PM I discussed the patient's case and the above findings with Dr. Davalos (GI) who will do endoscopy tomorrow and dilate the patient likely.    11:17 PM - Paged Hospitalist.    11:29 PM - Case discussed with Hospitalist GUILLERMINA HENRIQUEZ regarding admission. Treated patient with Dilaudid 1 mg injection for her symptoms. The patient had the opportunity to ask any questions. The plan for hospitalization was discussed with the patient given their current presentation and diagnostic study results. Patient is understanding and agreeable to the plan for hospitalization.    HYDRATION: Based on the patient's presentation of Acute Vomiting the patient was given IV fluids. IV Hydration was used because oral hydration was not adequate alone. Upon recheck following hydration, the patient was improved.  HTN/IDDM FOLLOW UP:  The patient is referred to a primary physician for blood pressure management, diabetic screening, and for all other preventive health concerns    ADDITIONAL PROBLEM LIST AND DISPOSITION  Patient feeling improved after fluids.               DISPOSITION AND DISCUSSIONS  I have discussed management of the patient with the following physicians and LOTTIE's: Hospitalist to admit the patient.    Discussion of management with other QHP or appropriate source(s): None       Barriers to care at this time, including but not limited to:  none .     Decision tools and prescription drugs considered including, but not limited  to: Pain Medications as needed. .    DISPOSITION:  Patient will be hospitalized by Dr. Rodriguez in guarded condition.    FINAL IMPRESSION   1. Esophageal stricture    2. Dehydration      Mike DE LA CRUZ (Scribe), am scribing for, and in the presence of, Luc Santos M.D..    Electronically signed by: Mike Arias (Scribe), 2/7/2023    Luc DE LA CRUZ M.D. personally performed the services described in this documentation, as scribed by Mike Arias in my presence, and it is both accurate and complete.    The note accurately reflects work and decisions made by me.  Luc Santos M.D.  2/8/2023  2:31 AM

## 2023-02-08 NOTE — ASSESSMENT & PLAN NOTE
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   Last seen by Dr. Lewis, rheumatology in 09/2022 with recommendation to check ESR/CRP.  To continue hydroxychloroquine and follow-up in 3 months.   -Continue hydroxychloroquine once able to tolerate PO meds.  -ESR/CRP ordered  -Encourage patient to follow-up with outpatient rheumatology

## 2023-02-08 NOTE — ED NOTES
Transport at bedside to take patient to 612-2, patient leaving ed in stable condition, aox4, gcs 15.

## 2023-02-08 NOTE — CARE PLAN
The patient is Stable - Low risk of patient condition declining or worsening    Shift Goals  Clinical Goals: Dysphagia, pain, nausea, anxiety  Patient Goals: rest      Problem: Pain - Standard  Goal: Alleviation of pain or a reduction in pain to the patient’s comfort goal  2/8/2023 0351 by Javier Gastelum R.N.  Outcome: Progressing  2/8/2023 0350 by Javier Gastelum R.N.  Outcome: Progressing     Problem: Knowledge Deficit - Standard  Goal: Patient and family/care givers will demonstrate understanding of plan of care, disease process/condition, diagnostic tests and medications  2/8/2023 0351 by Javier Gastelum R.N.  Outcome: Progressing  2/8/2023 0350 by Javier Gastelum R.N.  Outcome: Progressing     Problem: Psychosocial  Goal: Patient's level of anxiety will decrease  Outcome: Progressing  Goal: Patient's ability to verbalize feelings about condition will improve  Outcome: Progressing  Goal: Patient's ability to re-evaluate and adapt role responsibilities will improve  Outcome: Progressing  Goal: Patient and family will demonstrate ability to cope with life altering diagnosis and/or procedure  Outcome: Progressing  Goal: Spiritual and cultural needs incorporated into hospitalization  Outcome: Progressing     Problem: Communication  Goal: The ability to communicate needs accurately and effectively will improve  Outcome: Progressing     Problem: Discharge Barriers/Planning  Goal: Patient's continuum of care needs are met  Outcome: Progressing     Problem: Hemodynamics  Goal: Patient's hemodynamics, fluid balance and neurologic status will be stable or improve  Outcome: Progressing     Problem: Respiratory  Goal: Patient will achieve/maintain optimum respiratory ventilation and gas exchange  Outcome: Progressing     Problem: Dysphagia  Goal: Dysphagia will improve  Outcome: Progressing     Problem: Risk for Aspiration  Goal: Patient's risk for aspiration will be absent or decrease  Outcome: Progressing     Problem: Nutrition  Goal:  Patient's nutritional and fluid intake will be adequate or improve  Outcome: Progressing  Goal: Enteral nutrition will be maintained or improve  Outcome: Progressing  Goal: Enteral nutrition will be maintained or improve  Outcome: Progressing     Problem: Urinary Elimination  Goal: Establish and maintain regular urinary output  Outcome: Progressing     Problem: Bowel Elimination  Goal: Establish and maintain regular bowel function  Outcome: Progressing     Problem: Gastrointestinal Irritability  Goal: Nausea and vomiting will be absent or improve  Outcome: Progressing  Goal: Diarrhea will be absent or improved  Outcome: Progressing     Problem: Self Care  Goal: Patient will have the ability to perform ADLs independently or with assistance (bathe, groom, dress, toilet and feed)  Outcome: Progressing

## 2023-02-08 NOTE — RESPIRATORY CARE
COPD EDUCATION by COPD CLINICAL EDUCATOR  2/8/2023 at 7:23 AM by Yanet Sales, RRT     Patient reviewed by COPD education team. Patient does not have a history or diagnosis of COPD and is established with [facility] pulmonary. Per Dr. Tinoco, patient has moderate persistant asthma. No PFT on file and does not smoke, therefore does not qualify for COPD program

## 2023-02-08 NOTE — CARE PLAN
The patient is Stable - Low risk of patient condition declining or worsening    Shift Goals  Clinical Goals: pain and nausea control  Patient Goals: pain and nausea control  Family Goals: madina    Progress made toward(s) clinical / shift goals:    Problem: Pain - Standard  Goal: Alleviation of pain or a reduction in pain to the patient’s comfort goal  Outcome: Progressing     Problem: Knowledge Deficit - Standard  Goal: Patient and family/care givers will demonstrate understanding of plan of care, disease process/condition, diagnostic tests and medications  Outcome: Progressing     Problem: Psychosocial  Goal: Patient's level of anxiety will decrease  Outcome: Progressing  Goal: Patient's ability to verbalize feelings about condition will improve  Outcome: Progressing     Problem: Communication  Goal: The ability to communicate needs accurately and effectively will improve  Outcome: Progressing     Problem: Gastrointestinal Irritability  Goal: Nausea and vomiting will be absent or improve  Outcome: Progressing       Patient is not progressing towards the following goals:      Problem: Dysphagia  Goal: Dysphagia will improve  Outcome: Not Progressing     Problem: Nutrition  Goal: Patient's nutritional and fluid intake will be adequate or improve  Outcome: Not Progressing

## 2023-02-08 NOTE — ASSESSMENT & PLAN NOTE
Patient had a EGD dilation today  Per GI , positive  findings of fungal infection of proximal esophagus, recommended antifungal treatment.  GI will follow up with the pathology    Started on fluconazole 200 mg daily x21 days, final duration is up to GI

## 2023-02-08 NOTE — ANESTHESIA PROCEDURE NOTES
Airway    Date/Time: 2/8/2023 1:57 PM  Performed by: Louis Marley M.D.  Authorized by: Louis Marley M.D.     Location:  OR  Urgency:  Elective  Indications for Airway Management:  Anesthesia      Spontaneous Ventilation: absent    Sedation Level:  Deep  Preoxygenated: Yes    Patient Position:  Sniffing  Final Airway Type:  Endotracheal airway  Final Endotracheal Airway:  ETT  Cuffed: Yes    Technique Used for Successful ETT Placement:  Direct laryngoscopy    Insertion Site:  Oral  Blade Type:  Graham  Laryngoscope Blade/Videolaryngoscope Blade Size:  2  ETT Size (mm):  7.0  Measured from:  Teeth  ETT to Teeth (cm):  22  Placement Verified by: auscultation and capnometry    Cormack-Lehane Classification:  Grade I - full view of glottis  Number of Attempts at Approach:  1

## 2023-02-08 NOTE — ASSESSMENT & PLAN NOTE
In sinus rhythm on exam.   Rate in 70s.   Reports able to take morning dose of metoprolol.   Unable to tolerate PO meds now.   Plan:  -IV metoprolol as needed for sustained rate >130s.   -Restart home metoprolol post dilation

## 2023-02-08 NOTE — PROCEDURES
OPERATIVE REPORT    PATIENT:   Jana Overton   1955       PREOPERATIVE DIAGNOSES/INDICATIONS: Dysphagia.     POSTOPERATIVE DIAGNOSIS: r/o fungal infection, s/p empiric balloon up to 15mm at distal esophagus.     PROCEDURE:  ESOPHAGOGASTRODUODENOSCOPY    PHYSICIAN:  Jamarcus Davalos MD. MPH.    ANESTHESIA:  Per anesthesiologist.    LOCATION: [facility]    CONSENT:  OBTAINED. The risks, benefits and alternatives of the procedure were discussed in details. The risks include and are not limited to bleeding, infection, perforation, missed lesions, and sedations risks (cardiopulmonary compromise and allergic reaction to medications).    DESCRIPTION: The patient presented to the procedure room.  After routine checkup was performed, patient was brought into the endoscopy suite.  Patient was placed on his left lateral decubitus position. A bite block was placed in patient's mouth. Patient was sedated by anesthesia.  Vital signs were monitored throughout the procedure.  Oxygenation support was provided throughout the procedure. Upper endoscope was inserted into patient's mouth and advanced to the second portion of the duodenum under direct visualization.      Once the site was reached and examined, the upper endoscope was withdrawn.  Retroflexion was made within the stomach.  The stomach was decompressed, scope was withdrawn and the procedure was terminated.    The patient tolerated the procedure well.  There were no immediate complications.    OPERATIVE FINDINGS:    1. Esophagus: R/o fungal infection in proximal. Two muscle rings are found at the distal as reported before, s/p empiric balloon dilatation up to 15mm. No overt resistance. Proximal esophagus was biopsied.   2. Stomach: Grossly normal.   3. Duodenum: Normal.     IMPRESSION/RECOMMENDATIONS:  r/o fungal infection, s/p empiric balloon up to 15mm at distal esophagus.     Recs:  Empiric treatment of fungal infection.   Keep at least 40mg ppi daily.   Okay to be  discharge when she could maintain nutrition/hydration.   We will follow up on the pathology report.       Gi signs off.     This note has been transcribed with digital voice recognition software and although it has been reviewed may contain grammatical or word errors

## 2023-02-08 NOTE — ED NOTES
Pt is A&Ox4 and awake in bed. Pt placed on cardiac monitor, pulse ox, and automatic BP. VSS, saturating above 90% on RA, SR in the 70s. Call light within reach.

## 2023-02-08 NOTE — ANESTHESIA TIME REPORT
Anesthesia Start and Stop Event Times     Date Time Event    2/8/2023 1344 Ready for Procedure     1350 Anesthesia Start     1434 Anesthesia Stop        Responsible Staff  02/08/23    Name Role Begin End    Louis Marley M.D. Anesth 1350 1434        Overtime Reason:  no overtime (within assigned shift)    Comments:

## 2023-02-08 NOTE — H&P
[facility] Internal Medicine History & Physical Note    Date of Service  2/8/2023    [facility] Team: [facility] night team  Attending: Leonel Rodriguez M.d.  Senior Resident: Dr. Dalton  Contact Number: [phone]    Primary Care Physician  Chase Charles D.O.    Code Status  Full Code    Chief Complaint  Chief Complaint   Patient presents with    Difficulty Swallowing     Patient ambulates to triage c/o difficulties swallowing and spasms to throat, hx of strictures to throat. Patient has extensive medical history, was instructed to come to ER by PCP and GI. Patient reports being instructed to come to ed due to potential of choking. Also reports n/v       History of Presenting Illness (HPI):        Jana Overton is a 67 y.o. 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         Patient notes choking on food last night while trying to drink one of her protein shakes. She was able to tolerate medications this morning, but was not able to tolerate medications this afternoon causing her to vomit. She notes having some intermittent difficulty with liquids. She was instructed to come to ED by PCP and GI doctor.          She notes feeling generally weak and has been having difficulties sleeping the past couple of days. She notes palpitations and sensations of racing heart. She otherwise notes leg cramps, for which she takes tonic water. Reports chronic loose stools related to ostomy output. No fever or chills.           In the ED, patient was found to be afebrile at 97.5F, heart rate of 82, respiratory rate of 14, blood pressure of 130/85 mmHg, satting 92% on room air. White blood cell elevated at 12.3. Discussed with ER physician who noted GI was aware with plan for esophageal  dilation in the morning. Patient to be admitted with plan for potential esophageal dilation in the morning for likely underlying esophageal stricture as contributing to her dysphagia.     I discussed the plan of care with  nocturnist, Dr. Rodriguez .    Review of Systems  Review of Systems   Constitutional:  Positive for weight loss (related to dysphagia). Negative for chills and fever.   Respiratory:  Positive for sputum production. Negative for shortness of breath and wheezing.    Cardiovascular:  Positive for palpitations. Negative for chest pain.   Gastrointestinal:  Positive for nausea and vomiting (1 episode earlier today).   All other systems reviewed and are negative.    Past Medical History   has a past medical history of Anesthesia, Anxiety, Arthritis, ASTHMA, Atrial fib/flut, Backpain, Bronchitis, Chronic pain, Colostomy in place (HCC), COPD (chronic obstructive pulmonary disease) ([facility]), Crohn's disease of colon ([facility]), Dyspnea, History of cardiac murmur, Ileostomy present ([facility]), Infectious disease, Multiple falls, Narcotic dependence ([facility]), Obstruction, Pain, Pneumonia, Postherpetic neuralgia, Rosacea, and Sleep apnea.    Surgical History   has a past surgical history that includes lumbar fusion anterior; cervical disk and fusion anterior; foot surgery; hand surgery; other abdominal surgery; gastroscopy with biopsy (11/22/08); ileostomy (11/11/2009); dilation and curettage (9/24/2010); gastroscopy (10/4/2011); colonoscopy (10/4/2011); hardware removal neuro (7/16/2012); mammoplasty reduction (7/17/2013); ileostomy (5/14/2014); pr breast reduction; abdominal exploration; lumpectomy; colon resection; ileostomy (12/29/2018); sigmoidoscopy flex (12/29/2018); exploratory laparotomy (12/29/2018); gastroscopy (1/6/2019); gastroscopy (N/A, 5/22/2019); ileostomy (1/20/2021); lysis adhesions general (1/20/2021); pr cysto/uretero/pyeloscopy, dx (Right, 12/8/2021); cysto stent placemnt pre surg (Right, 12/8/2021); pr  "cystoscopy,insert ureteral stent (Right, 12/23/2021); pr cysto/uretero/pyeloscopy, dx (Right, 12/23/2021); pr upper gi endoscopy,diagnosis (N/A, 10/24/2022); biliary dilatation (N/A, 10/24/2022); pr upper gi endoscopy,diagnosis (N/A, 1/16/2023); pr upper gi endoscopy,w/dilat,gastric out (N/A, 1/16/2023); and pr upper gi endoscopy,biopsy (N/A, 1/16/2023).     Family History  family history includes Cancer (age of onset: 40) in her maternal aunt; Diabetes in her father and sister; Heart Disease in her father; Lung Disease in her mother.   Patient notes all of her cousins have Crohn's disease    Social History  Tobacco: None.  Alcohol: 1 glass of wine and 1 shot of gin mixed with tonic water to help with dysphagia.  Recreational drugs (illegal or prescription): Occasional marijuana to help with pain    Allergies  Allergies   Allergen Reactions    Demerol Hcl [Meperidine] Shortness of Breath and Palpitations    Methotrexate Hives, Shortness of Breath and Rash          Morphine Shortness of Breath and Palpitations    Promethazine Shortness of Breath and Rash          Remicade [Infliximab] Hives, Shortness of Breath and Rash          Sudafed [Pseudoephedrine] Shortness of Breath, Vomiting and Palpitations    Azathioprine Sodium Hives and Shortness of Breath     10/21/2022 - patient unable to verify allergy/reaction  Historical reaction listed: Hives, Shortness of Breath    Carafate [Sucralfate] Vomiting and Nausea     + Mouth Sores    Other Drug Unspecified     \"STEROIDS\" - REACTION NOT SPECIFIED    Sulfa Drugs Rash          Sulfasalazine Rash          Amitriptyline Unspecified     Nightmares      Ativan [Lorazepam] Unspecified     Nightmares    Betamethasone Unspecified     10/21/2022 - patient unable to verify allergy/reaction  No historical reaction listed    Fentanyl Unspecified     \"Patches didn't work\" and skin broke down    Lyrica [Pregabalin] Nausea          Methadone Unspecified     \"Didn't work\"    Potassium " Unspecified     Notes: In IV only  REACTION NOT SPECIFIED    Tizanidine Unspecified     10/21/2022 - patient unable to verify allergy/reaction  No historical reaction listed    Toradol [Ketorolac Tromethamine] Unspecified     10/21/2022 - patient unable to verify allergy/reaction, states she was told by her doctor not to take       Medications  Prior to Admission Medications   Prescriptions Last Dose Informant Patient Reported? Taking?   Ascorbic Acid (VITAMIN C PO)  Patient, Family Member Yes No   Sig: Take 1 Tablet by mouth 2 times a day.   Azelaic Acid 15 % Gel  Patient, Family Member Yes No   Sig: Apply 1 Application topically every day. Apply to face.   Biotin w/ Vitamins C & E (HAIR SKIN & NAILS GUMMIES PO)  Patient, Family Member Yes No   Sig: Take 2 Tablets by mouth every morning.   Maalox Plus - Diphenhydramine - Lidocaine (MBX Oral Solution)  Patient, Family Member No No   Sig: 10 ml po q6 hours prn.  Dysphagia /esophagitis   Multiple Vitamins-Minerals (CENTRUM SILVER 50+WOMEN) Tab  Patient, Family Member Yes No   Sig: Take 2 Tablets by mouth every morning. GUMMIES.   ZINC PICOLINATE PO  Patient, Family Member Yes No   Sig: Take 1 Tablet by mouth 2 times a day.   amLODIPine (NORVASC) 2.5 MG Tab   No No   Sig: Take 1 Tablet by mouth every day.   amoxicillin-clavulanate (AUGMENTIN) 875-125 MG Tab   No No   Sig: Take 1 Tablet by mouth 2 times a day.   diazePAM (VALIUM) 5 MG Tab  Patient, Family Member Yes No   Sig: diazepam 5 mg tablet   TAKE 1 TABLET BY MOUTH EVERY 6 HOURS AS NEEDED FOR MUSCLE SPASM   furosemide (LASIX) 20 MG Tab  Patient, Family Member No No   Sig: Take 1 Tablet by mouth 1 time a day as needed (leg swelling/weight gain).   granisetron (KYTRIL) 1 MG Tab   No No   Sig: Take 1 Tablet by mouth every 12 hours as needed for Nausea/Vomiting.   hydroxychloroquine (PLAQUENIL) 200 MG Tab  Patient, Family Member No No   Sig: Take 1.5 Tablets by mouth every day.   levalbuterol (XOPENEX HFA) 45 MCG/ACT  inhaler  Patient, Family Member No No   Sig: Inhale 2 Puffs every four hours as needed for Shortness of Breath (As needed for shortness of breath, cough, wheezing.).   methylPREDNISolone (MEDROL DOSEPAK) 4 MG Tablet Therapy Pack   No No   Sig: As directed on the packaging label.   metoprolol tartrate (LOPRESSOR) 50 MG Tab  Patient, Family Member No No   Sig: Take 1 Tablet by mouth 2 times a day.   naratriptan (AMERGE) 2.5 MG tablet  Patient, Family Member No No   Sig: Take 1 Tablet by mouth as needed for Migraine.   nystatin (MYCOSTATIN) 365220 UNIT/ML Suspension  Patient, Family Member No No   Sig: Take 5 mL by mouth 4 times a day.   ondansetron (ZOFRAN ODT) 4 MG TABLET DISPERSIBLE   No No   Sig: Take 1 Tablet by mouth every 6 hours as needed for Nausea/Vomiting.   oxyCODONE immediate release (ROXICODONE) 10 MG immediate release tablet  Patient, Family Member No No   Sig: Take 1 Tablet by mouth every 6 hours as needed for Severe Pain for up to 30 days.   Patient taking differently: Take 10 mg by mouth every 6 hours as needed for Severe Pain. Patient states she takes at 0600, 1200, 1800, and 0000 every day   potassium chloride SA (KDUR) 20 MEQ Tab CR  Patient's Home Pharmacy, Patient, Family Member No No   Sig: Take 1 Tablet by mouth 1 time a day as needed (when you take lasix.).   Patient not taking: Reported on 1/24/2023   prochlorperazine (COMPAZINE) 10 MG Tab   No No   Sig: TAKE ONE TABLET BY MOUTH ONE TIME DAY AS NEEDED FOR NAUSEA AND VOMITING   spironolactone (ALDACTONE) 25 MG Tab  Patient's Home Pharmacy, Patient, Family Member Yes No   Sig: Take 25 mg by mouth every day.   traZODone (DESYREL) 50 MG Tab  Patient, Family Member No No   Sig: TAKE ONE TABLET BY MOUTH EVERY EVENING      Facility-Administered Medications: None       Physical Exam  Temp:  [36.4 °C (97.5 °F)] 36.4 °C (97.5 °F)  Pulse:  [66-82] 66  Resp:  [14-19] 19  BP: (130-164)/(72-87) 164/87  SpO2:  [92 %-94 %] 94 %  Blood Pressure : (!) 164/87    Temperature: 36.4 °C (97.5 °F)   Pulse: 66   Respiration: 19   Pulse Oximetry: 94 %       Physical Exam  Constitutional:       General: She is not in acute distress.     Appearance: She is ill-appearing. She is not toxic-appearing.   HENT:      Head: Normocephalic and atraumatic.      Mouth/Throat:      Comments: White thrush appearing exudates  Cardiovascular:      Rate and Rhythm: Normal rate.   Pulmonary:      Effort: Pulmonary effort is normal.      Breath sounds: Normal breath sounds. No wheezing.   Abdominal:      General: Abdomen is flat. Bowel sounds are normal. There is no distension.      Palpations: Abdomen is soft.      Tenderness: There is no abdominal tenderness.   Musculoskeletal:      Right lower leg: Edema (trace) present.      Left lower leg: Edema (trace) present.   Skin:     General: Skin is dry.      Comments: Hives on hands and lower extremities (patient reports lupus symptoms)   Neurological:      Mental Status: She is alert and oriented to person, place, and time. Mental status is at baseline.   Psychiatric:         Mood and Affect: Mood normal.         Behavior: Behavior normal.       Laboratory:  Recent Labs     02/07/23  2140   WBC 12.3*   RBC 4.09*   HEMOGLOBIN 12.3   HEMATOCRIT 38.6   MCV 94.4   MCH 30.1   MCHC 31.9*   RDW 46.1   PLATELETCT 233   MPV 10.1     Recent Labs     02/07/23  2140   SODIUM 138   POTASSIUM 4.1   CHLORIDE 101   CO2 26   GLUCOSE 98   BUN 33*   CREATININE 1.16   CALCIUM 10.0     Recent Labs     02/07/23  2140   ALTSGPT 14   ASTSGOT 18   ALKPHOSPHAT 99   TBILIRUBIN 0.2   LIPASE 98*   GLUCOSE 98         No results for input(s): NTPROBNP in the last 72 hours.      No results for input(s): TROPONINT in the last 72 hours.    Assessment/Plan:  Problem Representation:   Jana Overton is a 67 y.o. female with a past medical history of drug induced lupus on hydroxychloroquine, Crohn's disease s/p iliectomy with ostomy, fibromyalgia, atrial fibrillation on metoprolol,  history of esophageal strictures requiring multiple dilatations, last ~01/15/23, who presented 2/7/2023 for difficulty swallowing. Admitted for inability to tolerate PO intake with plan for potential dilation in the morning.     I anticipate this patient is appropriate for observation status at this time because esophageal dilation for dysphagia    Patient will need a Med/Surg bed on MEDICAL service .  The need is secondary to inability to tolerate PO intake.    * Dysphagia- (present on admission)  Assessment & Plan  Likely related to underlying esophageal strictures.   Also has evidence of thrush on exam, which could contribute to dysphagia.  Has been having difficulty with medications and intermittently with liquids.   Last meal was liquid protein shake this morning.  Plan:  -Confirm with GI in the morning regarding potential dilation for esophageal stricture.   -Once patient able to adequate tolerate PO, continue nystatin swish and swallow    Esophageal stricture- (present on admission)  Assessment & Plan  History of esophageal strictures requiring dilatations, last 01/15/23.  -Confirm with gastroenterology in the morning regarding repeat dilatation.     Drug-induced lupus erythematosus- (present on admission)  Assessment & Plan  History of drug induced lupus, thought to be secondary to hydralazine.   Last seen by Dr. Lewis, rheumatology in 09/2022 with recommendation to check ESR/CRP.  To continue hydroxychloroquine and follow-up in 3 months.   -Continue hydroxychloroquine once able to tolerate PO meds.  -ESR/CRP ordered  -Encourage patient to follow-up with outpatient rheumatology    Leukocytosis  Assessment & Plan  WBC at 12.3. Afebrile.   Likely stress response.   Plan:  -Repeat CBC  -If wbc increasing, infectious work-up with cxr, ua    Atrial fibrillation (HCC)  Assessment & Plan  In sinus rhythm on exam.   Rate in 70s.   Reports able to take morning dose of metoprolol.   Unable to tolerate PO meds now.    Plan:  -IV metoprolol as needed for sustained rate >130s.   -Restart home metoprolol post dilation    Ileostomy in place (HCC)- (present on admission)  Assessment & Plan  No signs of surround erythema.   Patient had emptied bag before was able to examine her.   No stools in ostomy bag.         VTE prophylaxis: SCDs/TEDs

## 2023-02-08 NOTE — ANESTHESIA POSTPROCEDURE EVALUATION
Patient: Jana Overton    Procedure Summary     Date: 02/08/23 Room / Location: [facility] ROOM 26 / SURGERY SAME DAY [facility]    Anesthesia Start: 1350 Anesthesia Stop: 1434    Procedures:       GASTROSCOPY (Esophagus)      GASTROSCOPY, WITH BALLOON DILATION (Esophagus)      GASTROSCOPY, WITH BIOPSY (Esophagus) Diagnosis: (distal narrowing, fungal infection in esophagus)    Surgeons: Jamarcus Davalos M.D. Responsible Provider: Louis Marley M.D.    Anesthesia Type: general ASA Status: 2          Final Anesthesia Type: general  Last vitals  BP   Blood Pressure : (!) 162/71    Temp   36.8 °C (98.3 °F)    Pulse   81   Resp   18    SpO2   95 %      Anesthesia Post Evaluation    Patient location during evaluation: PACU  Patient participation: complete - patient participated  Level of consciousness: awake and alert  Pain score: 0    Airway patency: patent  Anesthetic complications: no  Cardiovascular status: hemodynamically stable  Respiratory status: acceptable  Hydration status: euvolemic    PONV: none          There were no known notable events for this encounter.     Nurse Pain Score: 0 (NPRS)

## 2023-02-08 NOTE — ASSESSMENT & PLAN NOTE
WBC at 12.3. Afebrile.   Likely stress response.   Plan:  -Repeat CBC  -If wbc increasing, infectious work-up with cxr, ua

## 2023-02-08 NOTE — ED NOTES
Patient ambulatory to bathroom with slow, shuffling gait, changing ostomy, instructed to pull cord for assistance when done

## 2023-02-08 NOTE — ASSESSMENT & PLAN NOTE
No signs of surround erythema.   Patient had emptied bag before was able to examine her.   No stools in ostomy bag.

## 2023-02-08 NOTE — DISCHARGE SUMMARY
Discharge Summary    CHIEF COMPLAINT ON ADMISSION  Chief Complaint   Patient presents with    Difficulty Swallowing     Patient ambulates to triage c/o difficulties swallowing and spasms to throat, hx of strictures to throat. Patient has extensive medical history, was instructed to come to ER by PCP and GI. Patient reports being instructed to come to ed due to potential of choking. Also reports n/v       Reason for Admission  Unable to swallow     Admission Date  2/7/2023    CODE STATUS  Prior    HPI & HOSPITAL COURSE  Jana Overton is a 67 y.o. female with a past medical history of drug induced lupus on hydroxychloroquine, Crohn's disease s/p iliectomy with ostomy, fibromyalgia, atrial fibrillation on metoprolol, history of esophageal strictures requiring multiple dilatations, last ~01/15/23, who presented 2/7/2023 for difficulty swallowing and pain.She was not able to swallow oral medications and some intermittent difficulty with liquids. She was instructed to come to ED by PCP and GI doctor. GI consulted, s/p EGD dilation 2/8/23, suspected fungal infection in proximal esophagus. GI recommended fungal treatment and GI will follow up the biopsy report. Started on fluconazole 200 mg daily x21 days, final duration is up to GI and biopsy report. Patient is able to tolerate milk shake, ok for discharge per GI.      Therefore, she is discharged in good and stable condition to home with close outpatient follow-up.    The patient recovered much more quickly than anticipated on admission.    Discharge Date  2/8/2023    FOLLOW UP ITEMS POST DISCHARGE  - Follow up with primary care physician in 1 week.   - Follow up with GI as instructed  - Please take the medications as instructed    - Go to the local Emergency Department if you have any worsening condition.       DISCHARGE DIAGNOSES  Principal Problem:    Dysphagia POA: Yes  Active Problems:    Ileostomy in place (HCC) POA: Yes    Atrial fibrillation (HCC) POA: Unknown     Leukocytosis POA: Unknown    Drug-induced lupus erythematosus POA: Yes    Esophageal stricture POA: Yes    Esophageal candidiasis (HCC) POA: Unknown  Resolved Problems:    * No resolved hospital problems. *      FOLLOW UP  Future Appointments   Date Time Provider Department Center   3/1/2023  2:20 PM Chase Charles D.O. [facility] None   3/7/2023  3:15 PM KEREN Bach [facility] None   3/16/2023  2:00 PM Petar Lewis M.D. [facility] None   6/13/2023  3:00 PM Harvey Monahan M.D. [facility] None   8/17/2023  1:00 PM Hao Stein D.O. [facility] None     No follow-up provider specified.    MEDICATIONS ON DISCHARGE     Medication List        START taking these medications        Instructions   fluconazole 200 MG Tabs  Commonly known as: DIFLUCAN   Take 1 Tablet by mouth every day for 21 days.  Dose: 200 mg            CONTINUE taking these medications        Instructions   Centrum Silver 50+Women Tabs   Take 2 Tablets by mouth every morning. GUMMIES.  Dose: 2 Tablet     diazePAM 5 MG Tabs  Commonly known as: VALIUM   Take 5 mg by mouth every 6 hours as needed (SPASMS).  Dose: 5 mg     furosemide 20 MG Tabs  Commonly known as: LASIX   Take 1 Tablet by mouth 1 time a day as needed (leg swelling/weight gain).  Dose: 20 mg     granisetron 1 MG Tabs  Commonly known as: KYTRIL   Take 1 Tablet by mouth every 12 hours as needed for Nausea/Vomiting.  Dose: 1 mg     HAIR SKIN & NAILS GUMMIES PO   Take 2 Tablets by mouth every morning.  Dose: 2 Tablet     hydroxychloroquine 200 MG Tabs  Commonly known as: Plaquenil   Take 1.5 Tablets by mouth every day.  Dose: 300 mg     levalbuterol 45 MCG/ACT inhaler  Commonly known as: Xopenex HFA   Inhale 2 Puffs every four hours as needed for Shortness of Breath (As needed for shortness of breath, cough, wheezing.).  Dose: 2 Puff     metoprolol tartrate 50 MG Tabs  Commonly known as: LOPRESSOR   Take 1 Tablet by mouth 2 times a day.  Dose: 50 mg     naratriptan 2.5 MG tablet  Commonly known as:  "AMERGE   Take 1 Tablet by mouth as needed for Migraine.  Dose: 2.5 mg     nystatin 822400 UNIT/ML Susp  Commonly known as: MYCOSTATIN   Take 5 mL by mouth 4 times a day.  Dose: 500,000 Units     ondansetron 4 MG Tbdp  Commonly known as: Zofran ODT   Take 1 Tablet by mouth every 6 hours as needed for Nausea/Vomiting.  Dose: 4 mg     prochlorperazine 10 MG Tabs  Commonly known as: COMPAZINE   TAKE ONE TABLET BY MOUTH ONE TIME DAY AS NEEDED FOR NAUSEA AND VOMITING     spironolactone 25 MG Tabs  Commonly known as: ALDACTONE   Take 25 mg by mouth every day.  Dose: 25 mg     traZODone 50 MG Tabs  Commonly known as: DESYREL   TAKE ONE TABLET BY MOUTH EVERY EVENING     VITAMIN C PO   Take 1 Tablet by mouth 2 times a day.  Dose: 1 Tablet     ZINC PICOLINATE PO   Take 1 Tablet by mouth 2 times a day.  Dose: 1 Tablet            STOP taking these medications      amLODIPine 2.5 MG Tabs  Commonly known as: NORVASC     oxyCODONE immediate release 10 MG immediate release tablet  Commonly known as: ROXICODONE     potassium chloride SA 20 MEQ Tbcr  Commonly known as: Kdur              Allergies  Allergies   Allergen Reactions    Demerol Hcl [Meperidine] Shortness of Breath and Palpitations    Methotrexate Hives, Shortness of Breath and Rash          Morphine Shortness of Breath and Palpitations    Promethazine Shortness of Breath and Rash          Remicade [Infliximab] Hives, Shortness of Breath and Rash          Sudafed [Pseudoephedrine] Shortness of Breath, Vomiting and Palpitations    Azathioprine Sodium Hives and Shortness of Breath     10/21/2022 - patient unable to verify allergy/reaction  Historical reaction listed: Hives, Shortness of Breath    Carafate [Sucralfate] Vomiting and Nausea     + Mouth Sores    Other Drug Unspecified     \"STEROIDS\" - REACTION NOT SPECIFIED    Sulfa Drugs Rash          Sulfasalazine Rash          Amitriptyline Unspecified     Nightmares      Ativan [Lorazepam] Unspecified     Nightmares    " "Betamethasone Unspecified     10/21/2022 - patient unable to verify allergy/reaction  No historical reaction listed    Fentanyl Unspecified     \"Patches didn't work\" and skin broke down    Lyrica [Pregabalin] Nausea          Methadone Unspecified     \"Didn't work\"    Potassium Unspecified     Notes: In IV only  REACTION NOT SPECIFIED    Tizanidine Unspecified     10/21/2022 - patient unable to verify allergy/reaction  No historical reaction listed    Toradol [Ketorolac Tromethamine] Unspecified     10/21/2022 - patient unable to verify allergy/reaction, states she was told by her doctor not to take       DIET  No orders of the defined types were placed in this encounter.      ACTIVITY  As tolerated.  Weight bearing as tolerated    CONSULTATIONS  GI    PROCEDURES  EGD dilation    LABORATORY  Lab Results   Component Value Date    SODIUM 139 02/08/2023    POTASSIUM 3.6 02/08/2023    CHLORIDE 104 02/08/2023    CO2 26 02/08/2023    GLUCOSE 101 (H) 02/08/2023    BUN 25 (H) 02/08/2023    CREATININE 0.87 02/08/2023    CREATININE 0.89 02/10/2010    GLOMRATE >59 02/10/2010        Lab Results   Component Value Date    WBC 10.1 02/08/2023    WBC 7.9 02/10/2010    HEMOGLOBIN 11.4 (L) 02/08/2023    HEMATOCRIT 34.5 (L) 02/08/2023    PLATELETCT 178 02/08/2023        Total time of the discharge process exceeds 35 minutes.  "

## 2023-02-08 NOTE — ED TRIAGE NOTES
Chief Complaint   Patient presents with    Difficulty Swallowing     Patient ambulates to triage c/o difficulties swallowing and spasms to throat, hx of strictures to throat. Patient has extensive medical history, was instructed to come to ER by PCP and GI. Patient reports being instructed to come to ed due to potential of choking. Also reports n/v     Charge RN notified of patient for expedited room placement.   Airway patient, aox4, gcs 15, educated on ed triage process, mask in place, instructed to notify staff of any changes in condition.  Placed back in ed lobby, apologized for wait times.

## 2023-02-08 NOTE — ASSESSMENT & PLAN NOTE
Likely related to underlying esophageal strictures.   Also has evidence of thrush on exam, which could contribute to dysphagia.  Has been having difficulty with medications and intermittently with liquids.   Last meal was liquid protein shake this morning.  Plan:  -Confirm with GI in the morning regarding potential dilation for esophageal stricture.   -Once patient able to adequate tolerate PO, continue nystatin swish and swallow

## 2023-02-08 NOTE — ASSESSMENT & PLAN NOTE
History of esophageal strictures requiring dilatations, last 01/15/23.  -Confirm with gastroenterology in the morning regarding repeat dilatation.

## 2023-02-08 NOTE — ED NOTES
Previous PIV infiltrated. New PIV established. Blood collected and sent to lab. Pt medicated as per MAR.

## 2023-02-08 NOTE — TELEPHONE ENCOUNTER
Telephone call returned answering machine only, advised the patient that rehydration may be appropriate given her condition, and that I do not know the ER doctor she has made reference to.

## 2023-02-08 NOTE — OR NURSING
1425 Patient arrived from OR to PACU 8. Connected to monitor and report received from anesthesia and RN. VSS. Pt on room air. Breaths calm, even, unlabored.     1455: Pt up to restroom; voided and emptied ostomy.     1507: Report to CAMERON Schmid via phone.     1509: Pt transported to [facility] via gurney by CNA. All personal belongings with pt.

## 2023-02-08 NOTE — ED NOTES
Patient returned to ed [facility] without issues, placed back on continuous cardiac monitor, call light within reach, fall precautions in place. Admitting MD at bedside

## 2023-02-08 NOTE — PROGRESS NOTES
Pre Procedure note.     Saw the patient in the morning.   Still has dysphagia, ? Food impaction.     Would offer EGD today.     Diagnosis: difficult swallowing.   Procedure: Esophagogastroduodenoscopy with possible dilatation.     Full note will follow.

## 2023-02-09 ENCOUNTER — PATIENT OUTREACH (OUTPATIENT)
Dept: MEDICAL GROUP | Facility: LAB | Age: 68
End: 2023-02-09
Payer: MEDICARE

## 2023-02-09 NOTE — PROGRESS NOTES
Patient outreach for TCM follow up. Patient states left eye is black and blue; both eyes swollen. Has an appointment with her eye doctor today, told the eye doctor it was an emergency; patient unsure why this has happened. Reviewed discharge instructions and new and current medications, however we were not able to complete either in detail, because Jana needed to leave for her appointment and I had allowed her to verbalize compliments and concerns about her hospital stay during our 32 min phone call. I let Jana know that I submitted a [email or web address] service response on her behalf, complimenting the RN traveler, per her request.  Patient declined any other immediate needs or questions about discharge instructions/medications.  Scheduled appointment with PCP for 03/01/23. PATIENT WANTED TO KEEP THAT DATE FOR PREVIOUSLY SCHEDULED APPT . I WAS ABLE TO UPDATE APPT TO A [facility] .

## 2023-02-14 ENCOUNTER — TELEPHONE (OUTPATIENT)
Dept: RHEUMATOLOGY | Facility: MEDICAL CENTER | Age: 68
End: 2023-02-14
Payer: MEDICARE

## 2023-02-14 DIAGNOSIS — T50.905A DRUG-INDUCED LUPUS ERYTHEMATOSUS: ICD-10-CM

## 2023-02-14 DIAGNOSIS — L93.2 DRUG-INDUCED LUPUS ERYTHEMATOSUS: ICD-10-CM

## 2023-02-14 DIAGNOSIS — K50.019 CROHN'S DISEASE OF SMALL INTESTINE WITH COMPLICATION (HCC): ICD-10-CM

## 2023-02-14 DIAGNOSIS — D64.9 ANEMIA, UNSPECIFIED TYPE: ICD-10-CM

## 2023-02-14 DIAGNOSIS — Z93.2 ILEOSTOMY IN PLACE (HCC): ICD-10-CM

## 2023-02-14 DIAGNOSIS — K22.2 ESOPHAGEAL STRICTURE: ICD-10-CM

## 2023-02-14 NOTE — TELEPHONE ENCOUNTER
New pharmacy  [facility]    Requesting refill      hydroxychloroquine (PLAQUENIL) 200 MG Tab     Thanks

## 2023-02-15 ENCOUNTER — TELEPHONE (OUTPATIENT)
Dept: MEDICAL GROUP | Facility: LAB | Age: 68
End: 2023-02-15
Payer: MEDICARE

## 2023-02-15 DIAGNOSIS — K50.019 CROHN'S DISEASE OF SMALL INTESTINE WITH COMPLICATION (HCC): ICD-10-CM

## 2023-02-15 DIAGNOSIS — Z93.2 ILEOSTOMY IN PLACE (HCC): ICD-10-CM

## 2023-02-15 DIAGNOSIS — K22.2 ESOPHAGEAL STRICTURE: ICD-10-CM

## 2023-02-15 DIAGNOSIS — D64.9 ANEMIA, UNSPECIFIED TYPE: ICD-10-CM

## 2023-02-15 RX ORDER — OXYCODONE HYDROCHLORIDE 10 MG/1
10 TABLET ORAL EVERY 8 HOURS PRN
Qty: 90 TABLET | Refills: 0 | Status: SHIPPED | OUTPATIENT
Start: 2023-02-15 | End: 2023-03-20 | Stop reason: SDUPTHER

## 2023-02-15 RX ORDER — HYDROXYCHLOROQUINE SULFATE 200 MG/1
200 TABLET, FILM COATED ORAL DAILY
Qty: 90 TABLET | Refills: 3 | Status: SHIPPED | OUTPATIENT
Start: 2023-02-15 | End: 2023-02-15 | Stop reason: SDUPTHER

## 2023-02-15 RX ORDER — HYDROXYCHLOROQUINE SULFATE 200 MG/1
300 TABLET, FILM COATED ORAL DAILY
Qty: 135 TABLET | Refills: 3 | Status: SHIPPED | OUTPATIENT
Start: 2023-02-15 | End: 2023-11-29 | Stop reason: DRUGHIGH

## 2023-02-15 RX ORDER — OXYCODONE HYDROCHLORIDE 10 MG/1
10 TABLET ORAL EVERY 8 HOURS PRN
Qty: 30 TABLET | Refills: 0 | Status: SHIPPED | OUTPATIENT
Start: 2023-02-15 | End: 2023-02-15

## 2023-02-15 RX ORDER — OXYCODONE HYDROCHLORIDE 10 MG/1
10 TABLET ORAL EVERY 6 HOURS PRN
Qty: 30 TABLET | Refills: 0 | Status: SHIPPED | OUTPATIENT
Start: 2023-02-15 | End: 2023-02-15 | Stop reason: SDUPTHER

## 2023-02-15 NOTE — TELEPHONE ENCOUNTER
"Physical Therapy paperwork received from [facility] requiring provider signature.     All appropriate fields completed by Medical Assistant: N/A CMA printed and distributed to MA    Paperwork placed in \"MA to Provider\" folder/basket. Awaiting provider completion/signature.  "

## 2023-02-15 NOTE — TELEPHONE ENCOUNTER
[facility]  oxyCODONE immediate release (ROXICODONE)  Please clarify SIG:  The dosing frequency of every 6 hr. Does not match the qty to dispense for 30 days

## 2023-02-16 NOTE — TELEPHONE ENCOUNTER
Afua:  [employer] message sent to the patient prescription has been rewritten.  Regards, Chase Charles,

## 2023-03-01 ENCOUNTER — OFFICE VISIT (OUTPATIENT)
Dept: MEDICAL GROUP | Facility: LAB | Age: 68
End: 2023-03-01
Payer: MEDICARE

## 2023-03-01 VITALS
BODY MASS INDEX: 19.5 KG/M2 | OXYGEN SATURATION: 93 % | TEMPERATURE: 97.8 F | SYSTOLIC BLOOD PRESSURE: 118 MMHG | WEIGHT: 144 LBS | RESPIRATION RATE: 16 BRPM | DIASTOLIC BLOOD PRESSURE: 76 MMHG | HEART RATE: 97 BPM | HEIGHT: 72 IN

## 2023-03-01 DIAGNOSIS — K22.2 ESOPHAGEAL STRICTURE: ICD-10-CM

## 2023-03-01 DIAGNOSIS — T78.40XA ALLERGY, INITIAL ENCOUNTER: ICD-10-CM

## 2023-03-01 DIAGNOSIS — D64.9 ANEMIA, UNSPECIFIED TYPE: ICD-10-CM

## 2023-03-01 DIAGNOSIS — K50.019 CROHN'S DISEASE OF SMALL INTESTINE WITH COMPLICATION (HCC): ICD-10-CM

## 2023-03-01 DIAGNOSIS — Z93.2 ILEOSTOMY IN PLACE (HCC): ICD-10-CM

## 2023-03-01 PROCEDURE — 99214 OFFICE O/P EST MOD 30 MIN: CPT | Performed by: INTERNAL MEDICINE

## 2023-03-01 RX ORDER — AZELASTINE 1 MG/ML
1 SPRAY, METERED NASAL 2 TIMES DAILY
Qty: 30 ML | Refills: 3 | Status: ON HOLD | OUTPATIENT
Start: 2023-03-01 | End: 2023-10-13

## 2023-03-01 RX ORDER — FLUTICASONE PROPIONATE 50 MCG
1 SPRAY, SUSPENSION (ML) NASAL DAILY
Qty: 16 G | Refills: 5 | Status: ON HOLD | OUTPATIENT
Start: 2023-03-01 | End: 2023-10-13

## 2023-03-01 ASSESSMENT — FIBROSIS 4 INDEX: FIB4 SCORE: 2.13

## 2023-03-01 NOTE — PROGRESS NOTES
CC: Jana Overton is a 67 y.o. female is suffering from   Chief Complaint   Patient presents with    [facility]         SUBJECTIVE:  1. Crohn's disease of small intestine with complication (HCC)  Patient is here for follow-up, continues to have problems with diarrhea associated with her Crohn's disease.  Were going to increase her oxycodone back up to 40 mg each day    2. Ileostomy in place (HCC)  History of ileostomy clinically stable    3. Esophageal stricture  History of esophageal stricture etiology behind this is uncertain concerning for possibly problems with localized scleroderma    4. Anemia, unspecified type  Ongoing anemia    5. Allergy, initial encounter  History of allergies continue current medical therapy        Past social, family, history: , Goyo  Social History     Tobacco Use    Smoking status: Never    Smokeless tobacco: Never    Tobacco comments:     second hand smoke parents - smoked for only 1 year many years ago   Vaping Use    Vaping Use: Every day    Substances: THC   Substance Use Topics    Alcohol use: Not Currently     Alcohol/week: 0.6 oz     Types: 1 Shots of liquor per week     Comment: gin and half a lime; tonic water    Drug use: Yes     Types: Marijuana, Inhaled     Comment: medical marijuana through bong/vape         MEDICATIONS:    Current Outpatient Medications:     azelastine (ASTELIN) 137 MCG/SPRAY nasal spray, Administer 1 Spray into affected nostril(S) 2 times a day., Disp: 30 mL, Rfl: 3    fluticasone (FLONASE) 50 MCG/ACT nasal spray, Administer 1 Spray into affected nostril(S) every day., Disp: 16 g, Rfl: 5    oxyCODONE immediate release (ROXICODONE) 10 MG immediate release tablet, Take 1 Tablet by mouth every 8 hours as needed for Severe Pain for up to 30 days., Disp: 90 Tablet, Rfl: 0    hydroxychloroquine (PLAQUENIL) 200 MG Tab, Take 1.5 Tablets by mouth every day., Disp: 135 Tablet, Rfl: 3    prochlorperazine (COMPAZINE) 10  MG Tab, TAKE ONE TABLET BY MOUTH ONE TIME DAY AS NEEDED FOR NAUSEA AND VOMITING, Disp: 20 Tablet, Rfl: 0    ondansetron (ZOFRAN ODT) 4 MG TABLET DISPERSIBLE, Take 1 Tablet by mouth every 6 hours as needed for Nausea/Vomiting., Disp: 20 Tablet, Rfl: 5    granisetron (KYTRIL) 1 MG Tab, Take 1 Tablet by mouth every 12 hours as needed for Nausea/Vomiting., Disp: 10 Tablet, Rfl: 0    diazePAM (VALIUM) 5 MG Tab, Take 5 mg by mouth every 6 hours as needed (SPASMS)., Disp: , Rfl:     metoprolol tartrate (LOPRESSOR) 50 MG Tab, Take 1 Tablet by mouth 2 times a day., Disp: 180 Tablet, Rfl: 2    levalbuterol (XOPENEX HFA) 45 MCG/ACT inhaler, Inhale 2 Puffs every four hours as needed for Shortness of Breath (As needed for shortness of breath, cough, wheezing.)., Disp: 1 Each, Rfl: 11    traZODone (DESYREL) 50 MG Tab, TAKE ONE TABLET BY MOUTH EVERY EVENING, Disp: 30 Tablet, Rfl: 3    spironolactone (ALDACTONE) 25 MG Tab, Take 25 mg by mouth every day., Disp: , Rfl:     naratriptan (AMERGE) 2.5 MG tablet, Take 1 Tablet by mouth as needed for Migraine., Disp: 5 Tablet, Rfl: 3    furosemide (LASIX) 20 MG Tab, Take 1 Tablet by mouth 1 time a day as needed (leg swelling/weight gain)., Disp: 90 Tablet, Rfl: 3    fluconazole (DIFLUCAN) 200 MG Tab, Take 1 Tablet by mouth every day for 21 days. (Patient not taking: Reported on 3/1/2023), Disp: 21 Tablet, Rfl: 0    nystatin (MYCOSTATIN) 223293 UNIT/ML Suspension, Take 5 mL by mouth 4 times a day. (Patient not taking: Reported on 3/1/2023), Disp: 60 mL, Rfl: 1    Multiple Vitamins-Minerals (CENTRUM SILVER 50+WOMEN) Tab, Take 2 Tablets by mouth every morning. GUMMIES. (Patient not taking: Reported on 2/9/2023), Disp: , Rfl:     ZINC PICOLINATE PO, Take 1 Tablet by mouth 2 times a day. (Patient not taking: Reported on 2/9/2023), Disp: , Rfl:     Ascorbic Acid (VITAMIN C PO), Take 1 Tablet by mouth 2 times a day. (Patient not taking: Reported on 2/9/2023), Disp: , Rfl:     Biotin w/ Vitamins C  & E (HAIR SKIN & NAILS GUMMIES PO), Take 2 Tablets by mouth every morning. (Patient not taking: Reported on 2/9/2023), Disp: , Rfl:     PROBLEMS:  Patient Active Problem List    Diagnosis Date Noted    Esophageal candidiasis ([facility]) 02/08/2023    Hypomagnesemia 01/16/2023    Sacroiliac joint pain 11/10/2022    Esophageal stricture 10/22/2022    Dysphagia 10/21/2022    Drug-induced lupus erythematosus 09/27/2022    Long-term use of hydroxychloroquine 09/27/2022    Fibromyalgia syndrome 09/27/2022    Positive cardiolipin antibodies (IgA and IgG anti-CL) 07/27/2022    Positive VAHID (1:640 homogenous pattern with negative reflex) 07/27/2022    Inflammatory arthritis 07/27/2022    Mild tetrahydrocannabinol (THC) abuse 06/02/2022    Oropharyngeal dysphagia 06/02/2022    Acute pancreatitis 05/23/2022    Migraine 06/04/2019    FLORES (acute kidney injury) ([facility]) 06/01/2019    Essential hypertension 05/20/2019    SIRS (systemic inflammatory response syndrome) ([facility]) 05/19/2019    History of MRSA infection 12/29/2018    History of infection with vancomycin resistant Enterococcus (VRE) 12/29/2018    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[facility]) 12/28/2018    Anemia 12/15/2018    Hyponatremia 12/12/2018    Leukocytosis 12/12/2018    Chronic respiratory failure with hypoxia ([facility]) 03/20/2018    Anxiety disorder due to multiple medical problems 12/01/2017    DDD (degenerative disc disease), lumbar 04/12/2017    History of DVT (deep vein thrombosis) 11/23/2016    Atrial fibrillation ([facility]) 03/26/2015    Sleep apnea 10/26/2014    Postherpetic neuralgia 06/24/2014    Multiple falls 06/24/2014    COPD (chronic obstructive pulmonary disease) ([facility]) 06/24/2014    Rosacea 06/24/2014    Ileostomy in place ([facility]) 06/26/2013    GERD (gastroesophageal reflux disease) 12/05/2012    Status post lumbar surgery 07/16/2012    Crohn's disease of small intestine with complication ([facility]) 09/23/2009    Central sensitization to pain 09/23/2009    Opioid type dependence, continuous  ([facility]) 09/23/2009    Degenerative joint disease of cervical and lumbar spine 09/23/2009       REVIEW OF SYSTEMS:  Gen.:  No Nausea, Vomiting, fever, Chills.  Heart: No chest pain.  Lungs:  No shortness of Breath.  Psychological: Rg unusual Anxiety depression     PHYSICAL EXAM   Constitutional: Alert, cooperative, not in acute distress.  Cardiovascular:  Rate Rhythm is regular without murmurs rubs clicks.     Thorax & Lungs: Clear to auscultation, no wheezing, rhonchi, or rales  HENT: Normocephalic, Atraumatic.  Eyes: PERRLA, EOMI, Conjunctiva normal.   Neck: Trachia is midline no swelling of the thyroid.   Lymphatic: No lymphadenopathy noted.   Neurologic: Alert & oriented x 3, cranial nerves II through XII are intact, Normal motor function, Normal sensory function, No focal deficits noted.   Psychiatric: Affect normal, Judgment normal, Mood normal.     VITAL SIGNS:/76   Pulse 97   Temp 36.6 °C (97.8 °F) (Temporal)   Resp 16   Ht 1.829 m (6')   Wt 65.3 kg (144 lb)   SpO2 93%   BMI 19.53 kg/m²     Labs: Reviewed    Assessment:                                                     Plan:    1. Crohn's disease of small intestine with complication ([facility])  Check for possible scleroderma associated with the esophagus and small bowel  - CENTROMERE AB, IGG; Future    2. Ileostomy in place ([facility])  Labs ordered  - CENTROMERE AB, IGG; Future    3. Esophageal stricture  Status post esophageal dilatation by gastroenterology  - CENTROMERE AB, IGG; Future    4. Anemia, unspecified type  Etiology uncertain    5. Allergy, initial encounter  Continue Astelin and Flonase  - azelastine (ASTELIN) 137 MCG/SPRAY nasal spray; Administer 1 Spray into affected nostril(S) 2 times a day.  Dispense: 30 mL; Refill: 3  - fluticasone (FLONASE) 50 MCG/ACT nasal spray; Administer 1 Spray into affected nostril(S) every day.  Dispense: 16 g; Refill: 5  - CENTROMERE AB, IGG; Future      Return to qid from tid due to diarrhea documented  problems Chase damon. [place] pain on diazepam due to herniated disk pain specialist will not write out for Valium and oxycodone with muscle spazm starting at 12 years old.

## 2023-03-16 ENCOUNTER — OFFICE VISIT (OUTPATIENT)
Dept: RHEUMATOLOGY | Facility: MEDICAL CENTER | Age: 68
End: 2023-03-16
Attending: STUDENT IN AN ORGANIZED HEALTH CARE EDUCATION/TRAINING PROGRAM
Payer: MEDICARE

## 2023-03-16 VITALS
SYSTOLIC BLOOD PRESSURE: 114 MMHG | HEART RATE: 75 BPM | BODY MASS INDEX: 19.77 KG/M2 | OXYGEN SATURATION: 95 % | WEIGHT: 146 LBS | DIASTOLIC BLOOD PRESSURE: 74 MMHG | TEMPERATURE: 97.1 F | HEIGHT: 72 IN

## 2023-03-16 DIAGNOSIS — T50.905A DRUG-INDUCED LUPUS ERYTHEMATOSUS: ICD-10-CM

## 2023-03-16 DIAGNOSIS — Z79.899 LONG-TERM USE OF HYDROXYCHLOROQUINE: ICD-10-CM

## 2023-03-16 DIAGNOSIS — L93.2 DRUG-INDUCED LUPUS ERYTHEMATOSUS: ICD-10-CM

## 2023-03-16 DIAGNOSIS — M79.7 FIBROMYALGIA SYNDROME: ICD-10-CM

## 2023-03-16 PROCEDURE — 99214 OFFICE O/P EST MOD 30 MIN: CPT | Performed by: STUDENT IN AN ORGANIZED HEALTH CARE EDUCATION/TRAINING PROGRAM

## 2023-03-16 PROCEDURE — 99212 OFFICE O/P EST SF 10 MIN: CPT | Performed by: STUDENT IN AN ORGANIZED HEALTH CARE EDUCATION/TRAINING PROGRAM

## 2023-03-16 RX ORDER — ACYCLOVIR 400 MG/1
TABLET ORAL
COMMUNITY
Start: 2023-02-14 | End: 2023-05-02

## 2023-03-16 RX ORDER — HYDRALAZINE HYDROCHLORIDE 10 MG/1
TABLET, FILM COATED ORAL
COMMUNITY
Start: 2023-02-14 | End: 2023-03-16

## 2023-03-16 ASSESSMENT — FIBROSIS 4 INDEX: FIB4 SCORE: 2.13

## 2023-03-16 NOTE — PROGRESS NOTES
[facility] RHEUMATOLOGY  [address], [address], [place] [address]  Phone: [phone] ? Fax: [phone]    RHEUMATOLOGY FOLLOW-UP VISIT NOTE      DATE OF SERVICE: 03/16/2023         Subjective     PRIMARY CARE PRACTITIONER:  Chase Charles D.O.  [address]  [place] [address]    PATIENT IDENTIFICATION:  Jana Overton  [address] [place] [address]    YOB: 1955    MEDICAL RECORD NUMBER: [id number]          CHIEF COMPLAINT:   Chief Complaint   Patient presents with    Follow-Up     Drug-induced lupus erythematosus       RHEUMATOLOGIC HISTORY:  Jana Overton is a 67 y.o. female with pertinent history notable for drug-induced lupus diagnosed in 7/2022, Crohn's disease diagnosed in late 1990s/early 2000s, DJD/DDD of cervical and lumbar spines s/p cervical and lumbar fusions, fibromyalgia/chronic pain syndrome since the 1990s (under the care of pain management), and multiple comorbidities. She initially presented on 7/27/2022 for evaluation in the setting of positive VAHID, reported widespread joint/muscle pain involving her hands, elbows, shoulders, neck, upper/mid/lower back, knees, ankles, and feet feet. These were associated with all day joint stiffness that improves with activity but her joint/muscle pain tends to worsen with much activity such that she feels exhausted by the end of the day. Reported chronic fatigue with muscle weakness, photosensitive rash on face/extremities, Raynaud's phenomenon on fingers/toes, dry eyes with redness/pain, dry mouth with episodic ulcers, and numerous nonspecific symptoms from multiple organ systems. Noted that she had been trying to manage with natural/alternative medicines to avoid needing to take immunosuppressive medications.     Pertinent treatment history: Remicade for Crohn's disease (last infusion in early 2000s), Neurontin and Lyrica (neither of which was very helpful), hydroxychloroquine 300 mg daily (8/2022-present).      Pertinent lab results history: Positive VAHID 1:640 homogenous pattern with positive anti-histone 2.9 and anti-chromatin 24 (in 7/2022); positive IgA anti-CL 24 and IgG anti-CL 19 with negative IgM anti-CL (in 10/2021); negative/normal ANCA (in 10/2021), RF, anti-CCP, HLA-B27, anti-TPO, anti-TG, C3, C4, CH50, CPK, TSH, and vitamin D (as of 7/2022), ESR, CRP, eGFR 72<51, creatinine 0.87<1.60, and LFTs (in 2/2023).    Pertinent XR imaging as of 7/2022: Hands with mild osteoarthritis of PIP and DIP joints, but no evidence of inflammatory arthropathy.    INTERVAL HISTORY:  Widespread bodily pain on every joint and muscle on her body.  Feeling tired and exhausted most of the time.  Cannabis and oxycodone have been helpful.    REVIEW OF SYSTEMS:  Except as noted in the history above, relevant review of systems with emphasis on autoimmune rheumatic diseases was otherwise negative.      ACTIVE PROBLEM LIST:  Patient Active Problem List    Diagnosis Date Noted    Esophageal candidiasis ([facility]) 02/08/2023    Hypomagnesemia 01/16/2023    Sacroiliac joint pain 11/10/2022    Esophageal stricture 10/22/2022    Dysphagia 10/21/2022    Drug-induced lupus erythematosus 09/27/2022    Long-term use of hydroxychloroquine 09/27/2022    Fibromyalgia syndrome 09/27/2022    Positive cardiolipin antibodies (IgA and IgG anti-CL) 07/27/2022    Positive VAHID (1:640 homogenous pattern with negative reflex) 07/27/2022    Inflammatory arthritis 07/27/2022    Mild tetrahydrocannabinol (THC) abuse 06/02/2022    Oropharyngeal dysphagia 06/02/2022    Acute pancreatitis 05/23/2022    Migraine 06/04/2019    FLORES (acute kidney injury) ([facility]) 06/01/2019    Essential hypertension 05/20/2019    SIRS (systemic inflammatory response syndrome) ([facility]) 05/19/2019    History of MRSA infection 12/29/2018    History of infection with vancomycin resistant Enterococcus (VRE) 12/29/2018    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[facility]) 12/28/2018    Anemia 12/15/2018    Hyponatremia 12/12/2018     Leukocytosis 12/12/2018    Chronic respiratory failure with hypoxia ([facility]) 03/20/2018    Anxiety disorder due to multiple medical problems 12/01/2017    DDD (degenerative disc disease), lumbar 04/12/2017    History of DVT (deep vein thrombosis) 11/23/2016    Atrial fibrillation ([facility]) 03/26/2015    Sleep apnea 10/26/2014    Postherpetic neuralgia 06/24/2014    Multiple falls 06/24/2014    COPD (chronic obstructive pulmonary disease) ([facility]) 06/24/2014    Rosacea 06/24/2014    Ileostomy in place ([facility]) 06/26/2013    GERD (gastroesophageal reflux disease) 12/05/2012    Status post lumbar surgery 07/16/2012    Crohn's disease of small intestine with complication ([facility]) 09/23/2009    Central sensitization to pain 09/23/2009    Opioid type dependence, continuous ([facility]) 09/23/2009    Degenerative joint disease of cervical and lumbar spine 09/23/2009       PAST MEDICAL HISTORY:  Past Medical History:   Diagnosis Date    Anesthesia     Difficult IV stick    Anxiety     Arthritis     all over    ASTHMA     Atrial fib/flut     Backpain     Bronchitis     5 years    Chronic pain     Colostomy in place ([facility])     COPD (chronic obstructive pulmonary disease) ([facility])     Crohn's disease of colon ([facility])     Dyspnea     History of cardiac murmur     Ileostomy present ([facility])     Infectious disease     MRSA, VancoRSA    Multiple falls     Narcotic dependence ([facility])     Obstruction     Pain     Pneumonia     child and mid 30's    Postherpetic neuralgia     Rosacea     Sleep apnea        PAST SURGICAL HISTORY:  Past Surgical History:   Procedure Laterality Date    ME UPPER GI ENDOSCOPY,DIAGNOSIS N/A 2/8/2023    Procedure: GASTROSCOPY;  Surgeon: Jamarcus Davalos M.D.;  Location: SURGERY SAME DAY [facility];  Service: Gastroenterology    ME UPPER GI ENDOSCOPY,W/DILAT,GASTRIC OUT N/A 2/8/2023    Procedure: GASTROSCOPY, WITH BALLOON DILATION;  Surgeon: Jamarcus Davalos M.D.;  Location: SURGERY SAME DAY [facility];  Service: Gastroenterology    ME UPPER  GI ENDOSCOPY,BIOPSY N/A 2/8/2023    Procedure: GASTROSCOPY, WITH BIOPSY;  Surgeon: Jamarcus Davalos M.D.;  Location: SURGERY SAME DAY [facility];  Service: Gastroenterology    MT UPPER GI ENDOSCOPY,DIAGNOSIS N/A 1/16/2023    Procedure: GASTROSCOPY;  Surgeon: Jamarcus Davalos M.D.;  Location: SURGERY SAME DAY [facility];  Service: Gastroenterology    MT UPPER GI ENDOSCOPY,W/DILAT,GASTRIC OUT N/A 1/16/2023    Procedure: GASTROSCOPY, WITH BALLOON DILATION;  Surgeon: Jamarcus Davalos M.D.;  Location: SURGERY SAME DAY [facility];  Service: Gastroenterology    MT UPPER GI ENDOSCOPY,BIOPSY N/A 1/16/2023    Procedure: GASTROSCOPY, WITH BIOPSY;  Surgeon: Jamarcus Davalos M.D.;  Location: SURGERY SAME DAY [facility];  Service: Gastroenterology    MT UPPER GI ENDOSCOPY,DIAGNOSIS N/A 10/24/2022    Procedure: GASTROSCOPY;  Surgeon: Jamarcus Davalos M.D.;  Location: SURGERY SAME DAY [facility];  Service: Gastroenterology    BILIARY DILATATION N/A 10/24/2022    Procedure: DILATION, STRICTURE, BILE DUCT;  Surgeon: Jamarcus Davalos M.D.;  Location: SURGERY SAME DAY [facility];  Service: Gastroenterology    MT CYSTOSCOPY,INSERT URETERAL STENT Right 12/23/2021    Procedure: CYSTOSCOPY, WITH URETERAL STENT EXCHANGE;  Surgeon: Jonathan Turcios M.D.;  Location: [facility];  Service: Urology    MT CYSTO/URETERO/PYELOSCOPY, DX Right 12/23/2021    Procedure: URETEROSCOPY;  Surgeon: Jonathan Turcios M.D.;  Location: [facility];  Service: Urology    MT CYSTO/URETERO/PYELOSCOPY, DX Right 12/8/2021    Procedure: URETEROSCOPY;  Surgeon: Luc Hook M.D.;  Location: [facility];  Service: Urology    CYSTO STENT PLACEMNT PRE SURG Right 12/8/2021    Procedure: CYSTOSCOPY, WITH URETERAL STENT INSERTION;  Surgeon: Luc Hook M.D.;  Location: [facility];  Service: Urology    ILEOSTOMY  1/20/2021    Procedure: ILEOSTOMY- COMPLEX REVISION,;  Surgeon: Kevin Cruz M.D.;  Location: SURGERY [facility];  Service: General    LYSIS  ADHESIONS GENERAL  1/20/2021    Procedure: LYSIS, ADHESIONS;  Surgeon: Kevin Cruz M.D.;  Location: SURGERY [facility];  Service: General    GASTROSCOPY N/A 5/22/2019    Procedure: GASTROSCOPY - WITH DILATION;  Surgeon: Dionicio Cardona M.D.;  Location: SURGERY [facility];  Service: Gastroenterology    GASTROSCOPY  1/6/2019    Procedure: GASTROSCOPY- WITH DILATION;  Surgeon: Daniel Daniels M.D.;  Location: SURGERY [facility];  Service: Gastroenterology    ILEOSTOMY  12/29/2018    Procedure: ILEOSTOMY- REVISION;  Surgeon: Kevin Cruz M.D.;  Location: SURGERY [facility];  Service: General    SIGMOIDOSCOPY FLEX  12/29/2018    Procedure: SIGMOIDOSCOPY FLEX;  Surgeon: Kevin Cruz M.D.;  Location: SURGERY [facility];  Service: General    EXPLORATORY LAPAROTOMY  12/29/2018    Procedure: EXPLORATORY LAPAROTOMY, lysis of adhesions;  Surgeon: Kevin Cruz M.D.;  Location: SURGERY [facility];  Service: General    ILEOSTOMY  5/14/2014    Performed by Kevin Cruz M.D. at SURGERY [facility]    MAMMOPLASTY REDUCTION  7/17/2013    Performed by Janey Burt M.D. at [facility] ORS    HARDWARE REMOVAL NEURO  7/16/2012    Performed by KEELEY KIM at [facility]    GASTROSCOPY  10/4/2011    Performed by TYSON MARTINEZ at SURGERY SAME DAY [facility] ORS    COLONOSCOPY  10/4/2011    Performed by TYSON MARTINEZ at SURGERY SAME DAY [facility] ORS    DILATION AND CURETTAGE  9/24/2010    Performed by GAURAV ALY at SURGERY SAME DAY [facility] ORS    ILEOSTOMY  11/11/2009    Performed by KEVIN CRUZ at [facility]    GASTROSCOPY WITH BIOPSY  11/22/08    Performed by NEGRITA HUYNH at [facility]    ABDOMINAL EXPLORATION      CERVICAL DISK AND FUSION ANTERIOR      COLON RESECTION      FOOT SURGERY      HAND SURGERY      LUMBAR FUSION ANTERIOR      LUMPECTOMY      OTHER ABDOMINAL SURGERY      surgery for chrons disease    GA  BREAST REDUCTION         MEDICATIONS:  Current Outpatient Medications   Medication Sig    azelastine (ASTELIN) 137 MCG/SPRAY nasal spray Administer 1 Spray into affected nostril(S) 2 times a day.    fluticasone (FLONASE) 50 MCG/ACT nasal spray Administer 1 Spray into affected nostril(S) every day.    oxyCODONE immediate release (ROXICODONE) 10 MG immediate release tablet Take 1 Tablet by mouth every 8 hours as needed for Severe Pain for up to 30 days.    hydroxychloroquine (PLAQUENIL) 200 MG Tab Take 1.5 Tablets by mouth every day.    prochlorperazine (COMPAZINE) 10 MG Tab TAKE ONE TABLET BY MOUTH ONE TIME DAY AS NEEDED FOR NAUSEA AND VOMITING    ondansetron (ZOFRAN ODT) 4 MG TABLET DISPERSIBLE Take 1 Tablet by mouth every 6 hours as needed for Nausea/Vomiting.    granisetron (KYTRIL) 1 MG Tab Take 1 Tablet by mouth every 12 hours as needed for Nausea/Vomiting.    nystatin (MYCOSTATIN) 643115 UNIT/ML Suspension Take 5 mL by mouth 4 times a day.    diazePAM (VALIUM) 5 MG Tab Take 5 mg by mouth every 6 hours as needed (SPASMS).    metoprolol tartrate (LOPRESSOR) 50 MG Tab Take 1 Tablet by mouth 2 times a day.    levalbuterol (XOPENEX HFA) 45 MCG/ACT inhaler Inhale 2 Puffs every four hours as needed for Shortness of Breath (As needed for shortness of breath, cough, wheezing.).    traZODone (DESYREL) 50 MG Tab TAKE ONE TABLET BY MOUTH EVERY EVENING    Ascorbic Acid (VITAMIN C PO) Take 1 Tablet by mouth 2 times a day.    Biotin w/ Vitamins C & E (HAIR SKIN & NAILS GUMMIES PO) Take 2 Tablets by mouth every morning.    spironolactone (ALDACTONE) 25 MG Tab Take 25 mg by mouth every day.    naratriptan (AMERGE) 2.5 MG tablet Take 1 Tablet by mouth as needed for Migraine.    furosemide (LASIX) 20 MG Tab Take 1 Tablet by mouth 1 time a day as needed (leg swelling/weight gain).    acyclovir (ZOVIRAX) 400 MG tablet TAKE 1 TABLET BY MOUTH EVERY 8 HOURS FOR 5 DAYS. REPEAT FOR FLARES. START 1 DAY PRIOR TO PROCEDURE (Patient not  "taking: Reported on 3/16/2023)    Multiple Vitamins-Minerals (CENTRUM SILVER 50+WOMEN) Tab Take 2 Tablets by mouth every morning. GUMMIES. (Patient not taking: Reported on 2/9/2023)    ZINC PICOLINATE PO Take 1 Tablet by mouth 2 times a day. (Patient not taking: Reported on 3/16/2023)       ALLERGIES:   Allergies   Allergen Reactions    Demerol Hcl [Meperidine] Shortness of Breath and Palpitations    Methotrexate Hives, Shortness of Breath and Rash          Morphine Shortness of Breath and Palpitations    Promethazine Shortness of Breath and Rash          Remicade [Infliximab] Hives, Shortness of Breath and Rash          Sudafed [Pseudoephedrine] Shortness of Breath, Vomiting and Palpitations    Azathioprine Sodium Hives and Shortness of Breath     10/21/2022 - patient unable to verify allergy/reaction  Historical reaction listed: Hives, Shortness of Breath    Carafate [Sucralfate] Vomiting and Nausea     + Mouth Sores    Other Drug Unspecified     \"STEROIDS\" - REACTION NOT SPECIFIED    Sulfa Drugs Rash          Sulfasalazine Rash          Azathioprine Hives    Amitriptyline Unspecified     Nightmares      Ativan [Lorazepam] Unspecified     Nightmares    Betamethasone Unspecified     10/21/2022 - patient unable to verify allergy/reaction  No historical reaction listed    Fentanyl Unspecified     \"Patches didn't work\" and skin broke down    Lyrica [Pregabalin] Nausea          Methadone Unspecified     \"Didn't work\"    Potassium Unspecified     Notes: In IV only  REACTION NOT SPECIFIED    Tizanidine Unspecified     10/21/2022 - patient unable to verify allergy/reaction  No historical reaction listed    Toradol [Ketorolac Tromethamine] Unspecified     10/21/2022 - patient unable to verify allergy/reaction, states she was told by her doctor not to take       IMMUNIZATIONS:  Immunization History   Administered Date(s) Administered    INFLUENZA TIV (IM) 09/17/2007, 10/06/2012, 11/16/2013, 09/27/2015    Influenza (IM) " Preservative Free - HISTORICAL DATA 12/17/2010, 09/27/2015    Influenza Seasonal Injectable - Historical Data 10/06/2012, 11/16/2013    Influenza Vac Subunit Quad Inj (Pf) 10/10/2017    Influenza Vaccine Adult HD 11/12/2018, 09/23/2020, 10/11/2021, 10/07/2022    Influenza Vaccine Quad Inj (Pf) 10/24/2014, 10/04/2017    Influenza Vaccine Quad Inj (Preserved) 09/21/2016, 10/11/2019    Influenza Vaccine-Flucelvax - HISTORICAL DATA 01/06/2014    PFIZER BIVALENT BOOSTER SARS-COV-2 VACCINE (12+) 10/07/2022    PFIZER DELATORRE CAP SARS-COV-2 VACCINATION (12+) 05/12/2022    PFIZER PURPLE CAP SARS-COV-2 VACCINATION (12+) 03/17/2021, 04/08/2021, 10/29/2021    Pneumococcal Conjugate Vaccine (Prevnar/PCV-13) 11/12/2018    Pneumococcal Vaccine (UF) - HISTORICAL DATA 10/17/2007    Pneumococcal polysaccharide vaccine (PPSV-23) 10/06/2012    Tdap Vaccine 12/04/2012, 11/16/2013    ZZZInfluenza Vaccine Pediatric Preserv Free 09/29/2009    Zoster Vaccine Live (ZVL) (Zostavax) - HISTORICAL DATA 11/01/2014       SOCIAL HISTORY:   Social History     Socioeconomic History    Marital status:     Highest education level: Associate degree: academic program   Tobacco Use    Smoking status: Never    Smokeless tobacco: Never    Tobacco comments:     second hand smoke parents - smoked for only 1 year many years ago   Vaping Use    Vaping Use: Every day    Substances: THC   Substance and Sexual Activity    Alcohol use: Not Currently     Alcohol/week: 0.6 oz     Types: 1 Shots of liquor per week     Comment: gin and half a lime; tonic water    Drug use: Yes     Types: Marijuana, Inhaled     Comment: medical marijuana through bong/vape     Social Determinants of Health     Financial Resource Strain: Unknown    Difficulty of Paying Living Expenses: Patient refused   Food Insecurity: Unknown    Worried About Running Out of Food in the Last Year: Patient refused    Ran Out of Food in the Last Year: Patient refused   Transportation Needs: Unknown     Lack of Transportation (Medical): Patient refused    Lack of Transportation (Non-Medical): Patient refused   Physical Activity: Sufficiently Active    Days of Exercise per Week: 7 days    Minutes of Exercise per Session: 60 min   Social Connections: Socially Integrated    Frequency of Communication with Friends and Family: Three times a week    Frequency of Social Gatherings with Friends and Family: Once a week    Attends [employer] Services: More than 4 times per year    Active Member of Clubs or Organizations: Yes    Attends Club or Organization Meetings: More than 4 times per year    Marital Status:    Housing Stability: Unknown    Unable to Pay for Housing in the Last Year: Patient refused    Number of Places Lived in the Last Year: 1    Unstable Housing in the Last Year: No       FAMILY HISTORY:  Family History   Problem Relation Age of Onset    Lung Disease Mother         copd    Diabetes Father     Heart Disease Father     Diabetes Sister     Cancer Maternal Aunt 40        breast            Objective     Vital Signs: /74 (BP Location: Left arm, Patient Position: Sitting, BP Cuff Size: Adult)   Pulse 75   Temp 36.2 °C (97.1 °F) (Temporal)   Ht 1.829 m (6')   Wt 66.2 kg (146 lb)   SpO2 95% Body mass index is 19.8 kg/m².    General: Appears well and comfortable  Eyes: No scleral or conjunctival lesions  ENT: No apparent oral or nasal lesions  Head/Neck: No apparent scalp or neck lesions  Cardiovascular: Regular rate and rhythm  Respiratory: Breathing quiet and unlabored  Gastrointestinal: No apparent organomegaly or abdominal masses  Integumentary: Mild malar rash on face; multiple tiny erythematous papules on upper and lower extremities  Musculoskeletal: Poorly localized moderate tenderness of hands (with mild prominence of MCP and PIP joints), elbows, shoulders, knees, ankles, feet, muscle groups of the chest wall, upper/lower extremities, neck/upper and mid/lower back; overall range of  motion limited by pain  Neurologic: No focal sensory or motor deficits  Psychiatric: Mood and affect appropriate      LABORATORY RESULTS REVIEWED AND INTERPRETED BY ME:  Lab Results   Component Value Date/Time    SEDRATEWES 14 02/08/2023 07:26 AM    CREACTPROT <0.30 02/08/2023 06:13 AM    URICACID 7.3 12/06/2022 04:51 PM     Lab Results   Component Value Date/Time    B0EJPSNGONT 142.2 07/28/2022 04:31 PM    A4LVBHMRXPT 46.9 07/28/2022 04:31 PM     Lab Results   Component Value Date/Time    RHEUMFACTN 11 06/06/2022 08:45 AM    CCPANTIBODY 8 07/28/2022 04:31 PM    PDRO91UOLC Negative 07/28/2022 04:31 PM     Lab Results   Component Value Date/Time    ANTINUCAB Detected (A) 06/06/2022 08:45 AM    ANADIRECT Negative 10/20/2010 11:00 AM    ANAPATTERN <1:40 01/07/2009 06:15 PM     Lab Results   Component Value Date/Time    ANTIDNADS 18 06/06/2022 08:45 AM    SMITHAB 1 06/06/2022 08:45 AM    RNPAB 4 06/06/2022 08:45 AM    ENQXRWJ17 0 06/06/2022 08:45 AM    SSA60 0 06/06/2022 08:45 AM    SSA52 0 06/06/2022 08:45 AM    ANTISSBSJ 0 06/06/2022 08:45 AM    JO1AB 3 06/06/2022 08:45 AM     Lab Results   Component Value Date/Time    IGACARDIOLI 24 (H) 10/26/2021 07:49 AM    IGMCARDIOLI <10 10/26/2021 07:49 AM    IGGCARDIOLI 19 (H) 10/26/2021 07:49 AM     Lab Results   Component Value Date/Time     (H) 11/01/2021 07:53 AM    IGG 1169 11/01/2021 07:53 AM     Lab Results   Component Value Date/Time    ANTIMITOCHO 3.5 07/22/2016 12:41 PM    FACTIN 11 07/22/2016 12:41 PM     Lab Results   Component Value Date/Time    MICROSOMALA <9.0 07/28/2022 04:31 PM    ANTITHYROGL <0.9 07/28/2022 04:31 PM    TSH 0.922 10/20/2010 11:00 AM    TSHULTRASEN 0.630 07/14/2022 11:23 AM    FREEDIR 1.28 10/20/2010 11:00 AM    FREET4 1.27 07/14/2022 11:23 AM     Lab Results   Component Value Date/Time    CPKTOTAL 41 05/05/2022 09:43 AM    ALDOLASE 4.2 01/21/2008 03:43 AM    MYOGLOBIN 73 07/17/2008 03:48 PM     Lab Results   Component Value Date/Time     25HYDROXY 40 07/14/2022 11:23 AM    PTHINTACT 52.3 12/06/2022 04:51 PM     Lab Results   Component Value Date/Time    FERRITIN 144.0 05/07/2020 01:34 PM    IRON 144 05/07/2020 01:34 PM    FOLATE 16.4 04/28/2021 07:59 AM    PROTHROMBTM 14.9 (H) 10/24/2022 01:19 AM    INR 1.19 (H) 10/24/2022 01:19 AM     Lab Results   Component Value Date/Time    WBC 10.1 02/08/2023 06:13 AM    WBC 7.9 02/10/2010 04:04 PM    RBC 3.76 (L) 02/08/2023 06:13 AM    RBC 4.86 02/10/2010 04:04 PM    HEMOGLOBIN 11.4 (L) 02/08/2023 06:13 AM    HEMATOCRIT 34.5 (L) 02/08/2023 06:13 AM    MCV 91.8 02/08/2023 06:13 AM    MCV 86 02/10/2010 04:04 PM    MCH 30.3 02/08/2023 06:13 AM    MCH 27.8 02/10/2010 04:04 PM    MCHC 33.0 (L) 02/08/2023 06:13 AM    RDW 45.4 02/08/2023 06:13 AM    RDW 14.6 02/10/2010 04:04 PM    PLATELETCT 178 02/08/2023 06:13 AM    MPV 10.1 02/08/2023 06:13 AM    NEUTS 8.03 (H) 02/08/2023 06:13 AM    NEUTS 4.8 02/10/2010 04:04 PM    POLYS 53 01/28/2021 02:40 PM    LYMPHOCYTES 12.10 (L) 02/08/2023 06:13 AM    MONOCYTES 6.60 02/08/2023 06:13 AM    EOSINOPHILS 0.50 02/08/2023 06:13 AM    EOSINOPHILS 34 01/28/2021 02:40 PM    BASOPHILS 0.50 02/08/2023 06:13 AM    HYPOCHROMIA 1+ 03/15/2014 10:02 AM     Lab Results   Component Value Date/Time    ASTSGOT 19 02/08/2023 06:13 AM    ALTSGPT 12 02/08/2023 06:13 AM    ALKPHOSPHAT 78 02/08/2023 06:13 AM    TBILIRUBIN 0.6 02/08/2023 06:13 AM    TOTPROTEIN 6.4 02/08/2023 06:13 AM    TOTPROTEIN 7.1 11/01/2021 07:53 AM    ALBUMIN 3.9 02/08/2023 06:13 AM    ALBUMIN 3.83 11/01/2021 07:53 AM     Lab Results   Component Value Date/Time    SODIUM 139 02/08/2023 06:13 AM    POTASSIUM 3.6 02/08/2023 06:13 AM    CHLORIDE 104 02/08/2023 06:13 AM    CO2 26 02/08/2023 06:13 AM    GLUCOSE 101 (H) 02/08/2023 06:13 AM    BUN 25 (H) 02/08/2023 06:13 AM    CREATININE 0.87 02/08/2023 06:13 AM    CREATININE 0.89 02/10/2010 04:04 PM    BUNCREATRAT 17 02/10/2010 04:04 PM    GLOMRATE >59 02/10/2010 04:04 PM    CALCIUM  9.2 02/08/2023 06:13 AM    MAGNESIUM 1.7 02/08/2023 06:13 AM     Lab Results   Component Value Date/Time    TOTALVOLUME 800 mL 12/08/2022 10:00 AM    TOTPROTUR 6 10/14/2021 05:00 AM    KMAZVCBV94 30 (L) 10/14/2021 05:00 AM    CREATININEU 984 12/08/2022 10:00 AM     Lab Results   Component Value Date/Time    COLORURINE Yellow 08/05/2022 10:47 PM    SPECGRAVITY 1.010 08/05/2022 10:47 PM    PHURINE 5.5 08/05/2022 10:47 PM    GLUCOSEUR Negative 08/05/2022 10:47 PM    KETONES Negative 08/05/2022 10:47 PM    PROTEINURIN Negative 08/05/2022 10:47 PM     Lab Results   Component Value Date/Time    FLTYPE Peritoneal 01/28/2021 02:40 PM     Lab Results   Component Value Date/Time    HEPBSAG Negative 07/22/2016 12:41 PM    HEPBCORIGM Negative 07/22/2016 12:41 PM    HEPCAB Negative 07/22/2016 12:41 PM     Lab Results   Component Value Date/Time    CHOLSTRLTOT 194 05/23/2022 02:36 AM    LDL 90 05/23/2022 02:36 AM    HDL 81 05/23/2022 02:36 AM    TRIGLYCERIDE 113 05/23/2022 02:36 AM    HBA1C 5.3 08/27/2015 01:50 PM       RADIOLOGY RESULTS REVIEWED AND INTERPRETED BY ME:    Results for orders placed in visit on 07/27/22    DX-JOINT SURVEY-HANDS SINGLE VIEW    Impression  1. No radiographic evidence of inflammatory arthropathy.    Results for orders placed in visit on 09/24/15    DS-BONE DENSITY STUDY (DEXA)    Impression  According to the World Health Organization classification, bone mineral density of this patient is normal for the lumbar spine and osteopenic for the left femur. Increase in bone density of the lumbar spine since the most recent exam is not statistically  significant. Decrease in bone density in the left femur since the most recent exam is statistically significant.    10-year Probability of Fracture:  Major Osteoporotic     15.0%  Hip     0.5%  Population      [place] ()    Based on left femur neck BMD      All relevant laboratory and imaging results reported on this note were reviewed and interpreted by  me.         Assessment & Plan     Jana Overton is a 67 y.o. female with history as noted above whose presentation merits the following diagnostic and clinical status impressions and recommendations:    1. Drug-induced lupus erythematosus  Clinical picture in the context of her autoantibody profile (positive anti-histone and anti-chromatin) is compatible with drug-induced lupus erythematosus presumably caused by her past use of hydralazine. Presently objective clinical assessment is confounded by her fibromyalgia syndrome with widespread musculoskeletal pain. In any case, given the potential for discordance between clinical and immunologic disease activity, need to routinely reassess markers of disease activity.  - COMPLEMENT C3; Future  - COMPLEMENT C4; Future  - Sed Rate  - CRP QUANTITIVE (NON-CARDIAC)  - Continue hydroxychloroquine 300 mg daily     2.  Fibromyalgia syndrome  Presumably the most significant etiology of her overall musculoskeletal pain burden at this time.  - Recommend trial of Cymbalta as Neurontin and Lyrica were previously ineffective  - Follow up with pain management as usual     3. Long-term use of hydroxychloroquine  Minimal to no risk of retinal toxicity given the low dose of hydroxychloroquine prescribed (less than 5 mg/kg of body weight) per rheumatology and ophthalmology guidelines. However, ophthalmologic evaluation is still recommended with the frequency of routine follow-up eye exams determined by the ophthalmologist, typically annually or every other year.  - Routine ophthalmology evaluation as recommended      FOLLOW-UP: Return in about 6 months (around 9/16/2023) for Short.         Thank you for the opportunity to participate in the care of Jana Overton.    Petar Lewis MD, MS  Rheumatologist, [facility] ? [facility]   of Clinical Medicine, Department of Internal Medicine  Formerly [facility] ? [facility]  of Medicine

## 2023-03-16 NOTE — DIETARY
"Nutrition services: Day 3 of admit.  Jana Overton is a 63 y.o. female with admitting DX of N/V, SBO   Pt seen per nutrition admit screen trigger: wt loss PTA    Spoke w/pt at bedside, she appears elderly. She states that her PO intake PTA has been poor as she would eat and then vomit.  Pt states this started November 14th.  Discussed snacks and supplements, preferences obtained as well.     Discussed wt hx, pt reports having wt fluctuation d/t fluids recently but states her usual weight is around 150# and she reports she was last at her usual weight the beginning of November.     Assessment:  Height: 182.9 cm (6')  Weight: 76.6 kg (168 lb 14 oz) via bed scale on 12/28  Body mass index is 22.9 kg/m². - WNL   Diet/Intake: FL, NTL - per ADLs, po intake >50% x 1 meal     Evaluation:   1. Per chart review, pt is s/p \"exploratory laparotomy, adhesiolysis, takedown of ileostomy, repair of parastomal hernia with placement of Gentrix xenograft hernia matrix, recreation of new ileostomy, ileoscopy using flexible sigmoidoscope\" on 12/29  2. Per chart review and per pt interview, pt wt appears stable   3. Pertinent meds and labs reviewed   4. Ileostomy in place +output   5. Noted with surgical incision     Recommendations/Plan:   1. Encourage PO intake   2. Document all PO intake as % taken in ADL's to provide interdisciplinary communication across all shifts.   3. Sending magic cups TID between meals   4. Monitor weight.  5. Nutrition rep will continue to see patient for ongoing meal and snack preferences.     RD following             " yes

## 2023-03-16 NOTE — PATIENT INSTRUCTIONS
AFTER VISIT INSTRUCTIONS    Below are important information to help you navigate your healthcare needs and help us serve you safely and effectively:  If laboratory tests and/or imaging studies were ordered, remember to go get them done as instructed.  If new prescriptions and/or refills were sent, remember to go pick them up from your local pharmacy, or call the specialty pharmacy to request shipment.  Always take your prescription medications exactly as prescribed unless instructed otherwise.  Note that antirheumatic drugs and steroids are immunosuppressive which means they increase your risk of infections and have multiple potential adverse effects on various organ systems in your body, though most of them are uncommon.  It is important that you are up-to-date on age-appropriate immunizations, particularly shingles and bacterial/viral pneumonia vaccines, which you can request from me or your primary care provider.  Be sure to read the drug package inserts to learn about the potential side effects of your medications before you start taking them.  If you experience any significant drug side effects, stop taking the medication and notify me promptly, and depending on the severity of the side effects, consider going to an urgent care or emergency department for immediate attention.  If there are significant findings on your lab tests and imaging studies that warrant further action, I will notify you with explanations via Fitsistanthart or phone call, otherwise you can view them on Flared3D and let me know if you have any questions.  Note that Flared3D messages are typically read during office hours and may take 1-7 business days before a response depending on the urgency of the situation and how busy my clinic schedule is.  In general, Flared3D messaging is for non-urgent matters that do not require immediate attention, so for urgent matters that cannot wait, you are advised to go to an urgent care.  Lastly, you are granted  MyChart access to my documentation of your visit and are encouraged to read my note which details my assessment and plan for your condition.

## 2023-03-17 DIAGNOSIS — G89.29 CENTRAL SENSITIZATION TO PAIN: ICD-10-CM

## 2023-03-20 DIAGNOSIS — D64.9 ANEMIA, UNSPECIFIED TYPE: ICD-10-CM

## 2023-03-20 DIAGNOSIS — K22.2 ESOPHAGEAL STRICTURE: ICD-10-CM

## 2023-03-20 DIAGNOSIS — K50.019 CROHN'S DISEASE OF SMALL INTESTINE WITH COMPLICATION (HCC): ICD-10-CM

## 2023-03-20 DIAGNOSIS — Z93.2 ILEOSTOMY IN PLACE (HCC): ICD-10-CM

## 2023-03-20 RX ORDER — OXYCODONE HYDROCHLORIDE 10 MG/1
10 TABLET ORAL EVERY 8 HOURS PRN
Qty: 120 TABLET | Refills: 0 | Status: SHIPPED | OUTPATIENT
Start: 2023-03-20 | End: 2023-04-03 | Stop reason: SDUPTHER

## 2023-03-21 ENCOUNTER — APPOINTMENT (RX ONLY)
Dept: URBAN - METROPOLITAN AREA CLINIC 20 | Facility: CLINIC | Age: 68
Setting detail: DERMATOLOGY
End: 2023-03-21

## 2023-03-21 DIAGNOSIS — L71.8 OTHER ROSACEA: ICD-10-CM

## 2023-03-21 DIAGNOSIS — D18.0 HEMANGIOMA: ICD-10-CM

## 2023-03-21 DIAGNOSIS — L57.0 ACTINIC KERATOSIS: ICD-10-CM

## 2023-03-21 DIAGNOSIS — Z85.828 PERSONAL HISTORY OF OTHER MALIGNANT NEOPLASM OF SKIN: ICD-10-CM

## 2023-03-21 DIAGNOSIS — Z09 ENCOUNTER FOR FOLLOW-UP EXAMINATION AFTER COMPLETED TREATMENT FOR CONDITIONS OTHER THAN MALIGNANT NEOPLASM: ICD-10-CM

## 2023-03-21 DIAGNOSIS — L81.4 OTHER MELANIN HYPERPIGMENTATION: ICD-10-CM

## 2023-03-21 DIAGNOSIS — L82.1 OTHER SEBORRHEIC KERATOSIS: ICD-10-CM

## 2023-03-21 DIAGNOSIS — D22 MELANOCYTIC NEVI: ICD-10-CM

## 2023-03-21 DIAGNOSIS — Z71.89 OTHER SPECIFIED COUNSELING: ICD-10-CM

## 2023-03-21 PROBLEM — D22.71 MELANOCYTIC NEVI OF RIGHT LOWER LIMB, INCLUDING HIP: Status: ACTIVE | Noted: 2023-03-21

## 2023-03-21 PROBLEM — D18.01 HEMANGIOMA OF SKIN AND SUBCUTANEOUS TISSUE: Status: ACTIVE | Noted: 2023-03-21

## 2023-03-21 PROBLEM — D22.72 MELANOCYTIC NEVI OF LEFT LOWER LIMB, INCLUDING HIP: Status: ACTIVE | Noted: 2023-03-21

## 2023-03-21 PROBLEM — D22.62 MELANOCYTIC NEVI OF LEFT UPPER LIMB, INCLUDING SHOULDER: Status: ACTIVE | Noted: 2023-03-21

## 2023-03-21 PROBLEM — D22.39 MELANOCYTIC NEVI OF OTHER PARTS OF FACE: Status: ACTIVE | Noted: 2023-03-21

## 2023-03-21 PROBLEM — D22.61 MELANOCYTIC NEVI OF RIGHT UPPER LIMB, INCLUDING SHOULDER: Status: ACTIVE | Noted: 2023-03-21

## 2023-03-21 PROBLEM — D22.5 MELANOCYTIC NEVI OF TRUNK: Status: ACTIVE | Noted: 2023-03-21

## 2023-03-21 PROCEDURE — ? SUNSCREEN RECOMMENDATIONS

## 2023-03-21 PROCEDURE — ? COUNSELING

## 2023-03-21 PROCEDURE — 17003 DESTRUCT PREMALG LES 2-14: CPT

## 2023-03-21 PROCEDURE — 17000 DESTRUCT PREMALG LESION: CPT

## 2023-03-21 PROCEDURE — ? PDT FOLLOW-UP EVALUATION

## 2023-03-21 PROCEDURE — 99213 OFFICE O/P EST LOW 20 MIN: CPT | Mod: 25

## 2023-03-21 PROCEDURE — ? ADDITIONAL NOTES

## 2023-03-21 PROCEDURE — ? LIQUID NITROGEN

## 2023-03-21 ASSESSMENT — LOCATION DETAILED DESCRIPTION DERM
LOCATION DETAILED: LEFT DISTAL POSTERIOR UPPER ARM
LOCATION DETAILED: RIGHT SUPERIOR UPPER BACK
LOCATION DETAILED: RIGHT INFERIOR MEDIAL MIDBACK
LOCATION DETAILED: RIGHT PROXIMAL PRETIBIAL REGION
LOCATION DETAILED: LEFT ELBOW
LOCATION DETAILED: RIGHT INFERIOR FOREHEAD
LOCATION DETAILED: RIGHT PROXIMAL DORSAL FOREARM
LOCATION DETAILED: LEFT DISTAL POSTERIOR THIGH
LOCATION DETAILED: RIGHT DISTAL POSTERIOR UPPER ARM
LOCATION DETAILED: LEFT DORSAL MIDDLE FINGER METACARPOPHALANGEAL JOINT
LOCATION DETAILED: LEFT PROXIMAL POSTERIOR UPPER ARM
LOCATION DETAILED: LEFT LATERAL PROXIMAL PRETIBIAL REGION
LOCATION DETAILED: RIGHT RADIAL DORSAL HAND
LOCATION DETAILED: RIGHT VENTRAL PROXIMAL FOREARM
LOCATION DETAILED: LEFT SUPERIOR MEDIAL MALAR CHEEK
LOCATION DETAILED: LEFT PROXIMAL DORSAL FOREARM
LOCATION DETAILED: LEFT INFERIOR CENTRAL MALAR CHEEK
LOCATION DETAILED: INFERIOR THORACIC SPINE
LOCATION DETAILED: LEFT VENTRAL PROXIMAL FOREARM
LOCATION DETAILED: LEFT MEDIAL TRAPEZIAL NECK
LOCATION DETAILED: RIGHT INFERIOR CENTRAL MALAR CHEEK
LOCATION DETAILED: RIGHT DISTAL POSTERIOR THIGH
LOCATION DETAILED: RIGHT INFERIOR MEDIAL UPPER BACK

## 2023-03-21 ASSESSMENT — LOCATION SIMPLE DESCRIPTION DERM
LOCATION SIMPLE: LEFT ELBOW
LOCATION SIMPLE: LEFT FOREARM
LOCATION SIMPLE: LEFT PRETIBIAL REGION
LOCATION SIMPLE: LEFT POSTERIOR UPPER ARM
LOCATION SIMPLE: RIGHT FOREHEAD
LOCATION SIMPLE: RIGHT HAND
LOCATION SIMPLE: LEFT HAND
LOCATION SIMPLE: POSTERIOR NECK
LOCATION SIMPLE: LEFT POSTERIOR THIGH
LOCATION SIMPLE: RIGHT PRETIBIAL REGION
LOCATION SIMPLE: RIGHT POSTERIOR UPPER ARM
LOCATION SIMPLE: RIGHT CHEEK
LOCATION SIMPLE: RIGHT LOWER BACK
LOCATION SIMPLE: RIGHT UPPER BACK
LOCATION SIMPLE: RIGHT FOREARM
LOCATION SIMPLE: UPPER BACK
LOCATION SIMPLE: RIGHT POSTERIOR THIGH
LOCATION SIMPLE: LEFT CHEEK

## 2023-03-21 ASSESSMENT — LOCATION ZONE DERM
LOCATION ZONE: ARM
LOCATION ZONE: NECK
LOCATION ZONE: LEG
LOCATION ZONE: HAND
LOCATION ZONE: TRUNK
LOCATION ZONE: FACE

## 2023-03-21 NOTE — PROCEDURE: ADDITIONAL NOTES
Render Risk Assessment In Note?: no
Additional Notes: Continue azelaic acid and Clindamycin-bp. No refills needed today.
Detail Level: Detailed

## 2023-03-21 NOTE — PROCEDURE: PDT FOLLOW-UP EVALUATION
Partial Response - Prescribe Aldara Text: The patient has had a partial response to photodynamic therapy.  The remaining precancerous lesions will be treated with topical field therapy using imiquimod cream.
Inadequate Response - Prescribe Zyclara Text: The patient has had an inadequate response to photodynamic therapy.  The remaining precancerous lesions will be treated with topical field therapy using Zyclara cream.
Complete Response Text: The patient has had a complete resolution of their actinic keratoses with photodynamic therapy.
Response To Pdt: Complete Response
Inadequate Response - Prescribe Aldara Text: The patient has had an inadequate response to photodynamic therapy.  The remaining precancerous lesions will be treated with topical field therapy using imiquimod cream.
Partial Response - Prescribe Efudex Text: The patient has had a partial response to photodynamic therapy.  The remaining precancerous lesions will be treated with topical field therapy using 5-fluorouracil cream.
Partial Response - Cryotherapy Performed Today Text: The patient has had a partial response to photodynamic therapy.  The remaining precancerous lesions will be treated with cryotherapy today.
Inadequate Response - Prescribe Efudex Text: The patient has had an inadequate response to photodynamic therapy.  The remaining precancerous lesions will be treated with topical field therapy using 5-fluorouracil cream.
Inadequate Response - Cryotherapy Performed Today Text: The patient has had an inadequate response to photodynamic therapy.  The remaining precancerous lesions will be treated with cryotherapy today.
Partial Response - Schedule Further Pdt Text: The patient has had a partial response to photodynamic therapy.  The remaining precancerous lesions will be treated by further photodynamic therapy.
Detail Level: Zone
Inadequate Response - Schedule Further Pdt Text: The patient has had an inadequate response to photodynamic therapy.  The remaining precancerous lesions will be treated by further photodynamic therapy.
Partial Response - Prescribe Picato Text: The patient has had a partial response to photodynamic therapy.  The remaining precancerous lesions will be treated with topical field therapy using Picato gel.
Inadequate Response - Prescribe Picato Text: The patient has had an inadequate response to photodynamic therapy.  The remaining precancerous lesions will be treated with topical field therapy using Picato gel.
Partial Response - Prescribe Zyclara Text: The patient has had a partial response to photodynamic therapy.  The remaining precancerous lesions will be treated with topical field therapy using Zyclara cream.

## 2023-03-28 ENCOUNTER — TELEPHONE (OUTPATIENT)
Dept: MEDICAL GROUP | Facility: LAB | Age: 68
End: 2023-03-28
Payer: MEDICARE

## 2023-04-03 ENCOUNTER — HOSPITAL ENCOUNTER (OUTPATIENT)
Dept: LAB | Facility: MEDICAL CENTER | Age: 68
End: 2023-04-03
Attending: INTERNAL MEDICINE
Payer: MEDICARE

## 2023-04-03 ENCOUNTER — HOSPITAL ENCOUNTER (OUTPATIENT)
Dept: LAB | Facility: MEDICAL CENTER | Age: 68
End: 2023-04-03
Attending: STUDENT IN AN ORGANIZED HEALTH CARE EDUCATION/TRAINING PROGRAM
Payer: MEDICARE

## 2023-04-03 ENCOUNTER — OFFICE VISIT (OUTPATIENT)
Dept: MEDICAL GROUP | Facility: LAB | Age: 68
End: 2023-04-03
Payer: MEDICARE

## 2023-04-03 VITALS
HEIGHT: 72 IN | TEMPERATURE: 97.6 F | RESPIRATION RATE: 16 BRPM | HEART RATE: 80 BPM | SYSTOLIC BLOOD PRESSURE: 114 MMHG | WEIGHT: 145 LBS | BODY MASS INDEX: 19.64 KG/M2 | OXYGEN SATURATION: 98 % | DIASTOLIC BLOOD PRESSURE: 62 MMHG

## 2023-04-03 DIAGNOSIS — L93.2 DRUG-INDUCED LUPUS ERYTHEMATOSUS: ICD-10-CM

## 2023-04-03 DIAGNOSIS — K50.019 CROHN'S DISEASE OF SMALL INTESTINE WITH COMPLICATION (HCC): ICD-10-CM

## 2023-04-03 DIAGNOSIS — R11.15 CYCLIC VOMITING SYNDROME: ICD-10-CM

## 2023-04-03 DIAGNOSIS — K22.2 ESOPHAGEAL STRICTURE: ICD-10-CM

## 2023-04-03 DIAGNOSIS — T50.905A DRUG-INDUCED LUPUS ERYTHEMATOSUS: ICD-10-CM

## 2023-04-03 DIAGNOSIS — T78.40XA ALLERGY, INITIAL ENCOUNTER: ICD-10-CM

## 2023-04-03 DIAGNOSIS — D64.9 ANEMIA, UNSPECIFIED TYPE: ICD-10-CM

## 2023-04-03 DIAGNOSIS — G44.209 TENSION-TYPE HEADACHE, NOT INTRACTABLE, UNSPECIFIED CHRONICITY PATTERN: ICD-10-CM

## 2023-04-03 DIAGNOSIS — Z93.2 ILEOSTOMY IN PLACE (HCC): ICD-10-CM

## 2023-04-03 PROCEDURE — 99214 OFFICE O/P EST MOD 30 MIN: CPT | Performed by: INTERNAL MEDICINE

## 2023-04-03 RX ORDER — OXYCODONE HYDROCHLORIDE 10 MG/1
10 TABLET ORAL EVERY 6 HOURS PRN
Qty: 120 TABLET | Refills: 0 | Status: SHIPPED | OUTPATIENT
Start: 2023-04-03 | End: 2023-05-03

## 2023-04-03 ASSESSMENT — FIBROSIS 4 INDEX: FIB4 SCORE: 2.13

## 2023-04-04 NOTE — PROGRESS NOTES
CC: Jana Overton is a 67 y.o. female is suffering from   Chief Complaint   Patient presents with    Crohn's Disease     1 month follow up    Chronic Opiate Therapy     Unable to tolerate lowered dose of opiate         SUBJECTIVE:  1. Esophageal stricture  Jana is here for follow-up continues to have problems with esophageal stricture, we refilled her pain medication    2. Ileostomy in place (HCC)  Ileostomy in place clinically stable    3. Crohn's disease of small intestine with complication (HCC)  Ongoing complications of Crohn's disease    4. Anemia, unspecified type  Anemia    5. Cyclic vomiting syndrome  Consider possible cyclic vomiting    6. Tension-type headache, not intractable, unspecified chronicity pattern  Possible migraine versus muscle tension headache        Past social, family, history:  very supportive   Social History     Tobacco Use    Smoking status: Never    Smokeless tobacco: Never    Tobacco comments:     second hand smoke parents - smoked for only 1 year many years ago   Vaping Use    Vaping Use: Every day    Substances: THC   Substance Use Topics    Alcohol use: Not Currently     Alcohol/week: 0.6 oz     Types: 1 Shots of liquor per week     Comment: gin and half a lime; tonic water    Drug use: Yes     Types: Marijuana, Inhaled     Comment: medical marijuana through bong/vape         MEDICATIONS:    Current Outpatient Medications:     oxyCODONE immediate release (ROXICODONE) 10 MG immediate release tablet, Take 1 Tablet by mouth every 6 hours as needed for Severe Pain for up to 30 days., Disp: 120 Tablet, Rfl: 0    azelastine (ASTELIN) 137 MCG/SPRAY nasal spray, Administer 1 Spray into affected nostril(S) 2 times a day., Disp: 30 mL, Rfl: 3    fluticasone (FLONASE) 50 MCG/ACT nasal spray, Administer 1 Spray into affected nostril(S) every day., Disp: 16 g, Rfl: 5    hydroxychloroquine (PLAQUENIL) 200 MG Tab, Take 1.5 Tablets by mouth every day., Disp: 135 Tablet, Rfl: 3     prochlorperazine (COMPAZINE) 10 MG Tab, TAKE ONE TABLET BY MOUTH ONE TIME DAY AS NEEDED FOR NAUSEA AND VOMITING, Disp: 20 Tablet, Rfl: 0    ondansetron (ZOFRAN ODT) 4 MG TABLET DISPERSIBLE, Take 1 Tablet by mouth every 6 hours as needed for Nausea/Vomiting., Disp: 20 Tablet, Rfl: 5    granisetron (KYTRIL) 1 MG Tab, Take 1 Tablet by mouth every 12 hours as needed for Nausea/Vomiting., Disp: 10 Tablet, Rfl: 0    nystatin (MYCOSTATIN) 725189 UNIT/ML Suspension, Take 5 mL by mouth 4 times a day., Disp: 60 mL, Rfl: 1    diazePAM (VALIUM) 5 MG Tab, Take 5 mg by mouth every 6 hours as needed (SPASMS)., Disp: , Rfl:     metoprolol tartrate (LOPRESSOR) 50 MG Tab, Take 1 Tablet by mouth 2 times a day., Disp: 180 Tablet, Rfl: 2    levalbuterol (XOPENEX HFA) 45 MCG/ACT inhaler, Inhale 2 Puffs every four hours as needed for Shortness of Breath (As needed for shortness of breath, cough, wheezing.)., Disp: 1 Each, Rfl: 11    traZODone (DESYREL) 50 MG Tab, TAKE ONE TABLET BY MOUTH EVERY EVENING, Disp: 30 Tablet, Rfl: 3    Biotin w/ Vitamins C & E (HAIR SKIN & NAILS GUMMIES PO), Take 2 Tablets by mouth every morning., Disp: , Rfl:     spironolactone (ALDACTONE) 25 MG Tab, Take 25 mg by mouth every day., Disp: , Rfl:     naratriptan (AMERGE) 2.5 MG tablet, Take 1 Tablet by mouth as needed for Migraine., Disp: 5 Tablet, Rfl: 3    furosemide (LASIX) 20 MG Tab, Take 1 Tablet by mouth 1 time a day as needed (leg swelling/weight gain)., Disp: 90 Tablet, Rfl: 3    acyclovir (ZOVIRAX) 400 MG tablet, TAKE 1 TABLET BY MOUTH EVERY 8 HOURS FOR 5 DAYS. REPEAT FOR FLARES. START 1 DAY PRIOR TO PROCEDURE (Patient not taking: Reported on 3/16/2023), Disp: , Rfl:     Multiple Vitamins-Minerals (CENTRUM SILVER 50+WOMEN) Tab, Take 2 Tablets by mouth every morning. GUMMIES. (Patient not taking: Reported on 2/9/2023), Disp: , Rfl:     ZINC PICOLINATE PO, Take 1 Tablet by mouth 2 times a day. (Patient not taking: Reported on 4/3/2023), Disp: , Rfl:      Ascorbic Acid (VITAMIN C PO), Take 1 Tablet by mouth 2 times a day. (Patient not taking: Reported on 4/3/2023), Disp: , Rfl:     PROBLEMS:  Patient Active Problem List    Diagnosis Date Noted    Esophageal candidiasis ([facility]) 02/08/2023    Hypomagnesemia 01/16/2023    Sacroiliac joint pain 11/10/2022    Esophageal stricture 10/22/2022    Dysphagia 10/21/2022    Drug-induced lupus erythematosus 09/27/2022    Long-term use of hydroxychloroquine 09/27/2022    Fibromyalgia syndrome 09/27/2022    Positive cardiolipin antibodies (IgA and IgG anti-CL) 07/27/2022    Positive VAHID (1:640 homogenous pattern with negative reflex) 07/27/2022    Inflammatory arthritis 07/27/2022    Mild tetrahydrocannabinol (THC) abuse 06/02/2022    Oropharyngeal dysphagia 06/02/2022    Acute pancreatitis 05/23/2022    Migraine 06/04/2019    FLORES (acute kidney injury) ([facility]) 06/01/2019    Essential hypertension 05/20/2019    SIRS (systemic inflammatory response syndrome) ([facility]) 05/19/2019    History of MRSA infection 12/29/2018    History of infection with vancomycin resistant Enterococcus (VRE) 12/29/2018    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[facility]) 12/28/2018    Anemia 12/15/2018    Hyponatremia 12/12/2018    Leukocytosis 12/12/2018    Chronic respiratory failure with hypoxia ([facility]) 03/20/2018    Anxiety disorder due to multiple medical problems 12/01/2017    DDD (degenerative disc disease), lumbar 04/12/2017    History of DVT (deep vein thrombosis) 11/23/2016    Atrial fibrillation ([facility]) 03/26/2015    Sleep apnea 10/26/2014    Postherpetic neuralgia 06/24/2014    Multiple falls 06/24/2014    COPD (chronic obstructive pulmonary disease) ([facility]) 06/24/2014    Rosacea 06/24/2014    Ileostomy in place ([facility]) 06/26/2013    GERD (gastroesophageal reflux disease) 12/05/2012    Status post lumbar surgery 07/16/2012    Crohn's disease of small intestine with complication ([facility]) 09/23/2009    Central sensitization to pain 09/23/2009    Opioid type dependence, continuous (CMS-HCC)  09/23/2009    Degenerative joint disease of cervical and lumbar spine 09/23/2009       REVIEW OF SYSTEMS:  Gen.:  No Nausea, Vomiting, fever, Chills.  Heart: No chest pain.  Lungs:  No shortness of Breath.  Psychological: Rg unusual Anxiety depression     PHYSICAL EXAM   Constitutional: Alert, cooperative, not in acute distress.  Cardiovascular:  Rate Rhythm is regular without murmurs rubs clicks.     Thorax & Lungs: Clear to auscultation, no wheezing, rhonchi, or rales  HENT: Normocephalic, Atraumatic.  Eyes: PERRLA, EOMI, Conjunctiva normal.   Neck: Trachia is midline no swelling of the thyroid.   Lymphatic: No lymphadenopathy noted.   Neurologic: Alert & oriented x 3, cranial nerves II through XII are intact, Normal motor function, Normal sensory function, No focal deficits noted.   Psychiatric: Affect normal, Judgment normal, Mood normal.     VITAL SIGNS:/62   Pulse 80   Temp 36.4 °C (97.6 °F) (Temporal)   Resp 16   Ht 1.829 m (6')   Wt 65.8 kg (145 lb)   SpO2 98%   BMI 19.67 kg/m²     Labs: Reviewed    Assessment:                                                     Plan:    1. Esophageal stricture  Continue Roxicodone State drug task force reviewed  - oxyCODONE immediate release (ROXICODONE) 10 MG immediate release tablet; Take 1 Tablet by mouth every 6 hours as needed for Severe Pain for up to 30 days.  Dispense: 120 Tablet; Refill: 0    2. Ileostomy in place (HCC)  Stable  - oxyCODONE immediate release (ROXICODONE) 10 MG immediate release tablet; Take 1 Tablet by mouth every 6 hours as needed for Severe Pain for up to 30 days.  Dispense: 120 Tablet; Refill: 0    3. Crohn's disease of small intestine with complication (HCC)  Stable  - oxyCODONE immediate release (ROXICODONE) 10 MG immediate release tablet; Take 1 Tablet by mouth every 6 hours as needed for Severe Pain for up to 30 days.  Dispense: 120 Tablet; Refill: 0    4. Anemia, unspecified type  Patient is not to take nonsteroidal  anti-inflammatories  - oxyCODONE immediate release (ROXICODONE) 10 MG immediate release tablet; Take 1 Tablet by mouth every 6 hours as needed for Severe Pain for up to 30 days.  Dispense: 120 Tablet; Refill: 0    5. Cyclic vomiting syndrome  MRI of the brain ordered  - MR-BRAIN-W/O; Future    6. Tension-type headache, not intractable, unspecified chronicity pattern  Previous MRIs done at [facility]  - MR-BRAIN-W/O; Future

## 2023-04-06 RX ORDER — TRAZODONE HYDROCHLORIDE 50 MG/1
50 TABLET ORAL EVERY EVENING
Qty: 90 TABLET | Refills: 3 | Status: SHIPPED | OUTPATIENT
Start: 2023-04-06

## 2023-04-21 ENCOUNTER — TELEPHONE (OUTPATIENT)
Dept: HEALTH INFORMATION MANAGEMENT | Facility: OTHER | Age: 68
End: 2023-04-21

## 2023-05-01 ENCOUNTER — TELEPHONE (OUTPATIENT)
Dept: MEDICAL GROUP | Facility: LAB | Age: 68
End: 2023-05-01
Payer: MEDICARE

## 2023-05-02 ENCOUNTER — OFFICE VISIT (OUTPATIENT)
Dept: MEDICAL GROUP | Facility: LAB | Age: 68
End: 2023-05-02
Payer: MEDICARE

## 2023-05-02 VITALS
WEIGHT: 147 LBS | DIASTOLIC BLOOD PRESSURE: 56 MMHG | RESPIRATION RATE: 12 BRPM | TEMPERATURE: 97.5 F | SYSTOLIC BLOOD PRESSURE: 102 MMHG | HEIGHT: 72 IN | HEART RATE: 83 BPM | BODY MASS INDEX: 19.91 KG/M2 | OXYGEN SATURATION: 96 %

## 2023-05-02 DIAGNOSIS — J01.01 ACUTE RECURRENT MAXILLARY SINUSITIS: ICD-10-CM

## 2023-05-02 DIAGNOSIS — Z93.2 ILEOSTOMY IN PLACE (HCC): ICD-10-CM

## 2023-05-02 DIAGNOSIS — D64.9 ANEMIA, UNSPECIFIED TYPE: ICD-10-CM

## 2023-05-02 DIAGNOSIS — K50.019 CROHN'S DISEASE OF SMALL INTESTINE WITH COMPLICATION (HCC): ICD-10-CM

## 2023-05-02 DIAGNOSIS — K22.2 ESOPHAGEAL STRICTURE: ICD-10-CM

## 2023-05-02 PROCEDURE — 99214 OFFICE O/P EST MOD 30 MIN: CPT | Performed by: INTERNAL MEDICINE

## 2023-05-02 RX ORDER — OXYCODONE HYDROCHLORIDE 10 MG/1
10 TABLET ORAL EVERY 6 HOURS PRN
Qty: 120 TABLET | Refills: 0 | Status: SHIPPED | OUTPATIENT
Start: 2023-07-12 | End: 2023-07-17 | Stop reason: SDUPTHER

## 2023-05-02 RX ORDER — OXYCODONE HYDROCHLORIDE 10 MG/1
10 TABLET ORAL EVERY 6 HOURS PRN
Qty: 30 TABLET | Refills: 0 | Status: SHIPPED | OUTPATIENT
Start: 2023-06-12 | End: 2023-06-12 | Stop reason: SDUPTHER

## 2023-05-02 RX ORDER — OXYCODONE HYDROCHLORIDE 10 MG/1
10 TABLET ORAL EVERY 6 HOURS PRN
Qty: 120 TABLET | Refills: 0 | Status: SHIPPED | OUTPATIENT
Start: 2023-05-13 | End: 2023-08-22 | Stop reason: SDUPTHER

## 2023-05-02 RX ORDER — AMOXICILLIN AND CLAVULANATE POTASSIUM 875; 125 MG/1; MG/1
1 TABLET, FILM COATED ORAL 2 TIMES DAILY
Qty: 14 TABLET | Refills: 0 | Status: ON HOLD | OUTPATIENT
Start: 2023-05-02 | End: 2023-10-13

## 2023-05-02 ASSESSMENT — FIBROSIS 4 INDEX: FIB4 SCORE: 2.13

## 2023-05-03 NOTE — PROGRESS NOTES
CC: Jana Overton is a 67 y.o. female is suffering from   Chief Complaint   Patient presents with    Follow-Up     1 month follow up         SUBJECTIVE:  1. Acute recurrent maxillary sinusitis  Jana is here for follow-up, continues to do quite well, is concerned about possible maxillary sinusitis    2. Esophageal stricture  History of esophageal stricture with dilatation x2 clinically now stable    3. Ileostomy in place (HCC)  Ileostomy in place    4. Crohn's disease of small intestine with complication (HCC)  By history    5. Anemia, unspecified type  No change in medical therapy        Past social, family, history: , [place]  Social History     Tobacco Use    Smoking status: Never    Smokeless tobacco: Never    Tobacco comments:     second hand smoke parents - smoked for only 1 year many years ago   Vaping Use    Vaping Use: Every day    Substances: THC   Substance Use Topics    Alcohol use: Not Currently     Alcohol/week: 0.6 oz     Types: 1 Shots of liquor per week     Comment: gin and half a lime; tonic water    Drug use: Yes     Types: Marijuana, Inhaled     Comment: medical marijuana through bong/vape         MEDICATIONS:    Current Outpatient Medications:     amoxicillin-clavulanate (AUGMENTIN) 875-125 MG Tab, Take 1 Tablet by mouth 2 times a day., Disp: 14 Tablet, Rfl: 0    [START ON 5/13/2023] oxyCODONE immediate release (ROXICODONE) 10 MG immediate release tablet, Take 1 Tablet by mouth every 6 hours as needed for Severe Pain (refill #1 of #3.) for up to 30 days., Disp: 120 Tablet, Rfl: 0    [START ON 6/12/2023] oxyCODONE immediate release (ROXICODONE) 10 MG immediate release tablet, Take 1 Tablet by mouth every 6 hours as needed for Severe Pain (refill #2 of #3.) for up to 30 days., Disp: 30 Tablet, Rfl: 0    [START ON 7/12/2023] oxyCODONE immediate release (ROXICODONE) 10 MG immediate release tablet, Take 1 Tablet by mouth every 6 hours as needed for Severe Pain (Refill #3 of #3.) for up to 30  days., Disp: 120 Tablet, Rfl: 0    traZODone (DESYREL) 50 MG Tab, Take 1 Tablet by mouth every evening., Disp: 90 Tablet, Rfl: 3    oxyCODONE immediate release (ROXICODONE) 10 MG immediate release tablet, Take 1 Tablet by mouth every 6 hours as needed for Severe Pain for up to 30 days., Disp: 120 Tablet, Rfl: 0    azelastine (ASTELIN) 137 MCG/SPRAY nasal spray, Administer 1 Spray into affected nostril(S) 2 times a day., Disp: 30 mL, Rfl: 3    fluticasone (FLONASE) 50 MCG/ACT nasal spray, Administer 1 Spray into affected nostril(S) every day., Disp: 16 g, Rfl: 5    hydroxychloroquine (PLAQUENIL) 200 MG Tab, Take 1.5 Tablets by mouth every day., Disp: 135 Tablet, Rfl: 3    prochlorperazine (COMPAZINE) 10 MG Tab, TAKE ONE TABLET BY MOUTH ONE TIME DAY AS NEEDED FOR NAUSEA AND VOMITING, Disp: 20 Tablet, Rfl: 0    ondansetron (ZOFRAN ODT) 4 MG TABLET DISPERSIBLE, Take 1 Tablet by mouth every 6 hours as needed for Nausea/Vomiting., Disp: 20 Tablet, Rfl: 5    granisetron (KYTRIL) 1 MG Tab, Take 1 Tablet by mouth every 12 hours as needed for Nausea/Vomiting., Disp: 10 Tablet, Rfl: 0    nystatin (MYCOSTATIN) 033771 UNIT/ML Suspension, Take 5 mL by mouth 4 times a day., Disp: 60 mL, Rfl: 1    diazePAM (VALIUM) 5 MG Tab, Take 5 mg by mouth every 6 hours as needed (SPASMS)., Disp: , Rfl:     metoprolol tartrate (LOPRESSOR) 50 MG Tab, Take 1 Tablet by mouth 2 times a day., Disp: 180 Tablet, Rfl: 2    levalbuterol (XOPENEX HFA) 45 MCG/ACT inhaler, Inhale 2 Puffs every four hours as needed for Shortness of Breath (As needed for shortness of breath, cough, wheezing.)., Disp: 1 Each, Rfl: 11    Biotin w/ Vitamins C & E (HAIR SKIN & NAILS GUMMIES PO), Take 2 Tablets by mouth every morning., Disp: , Rfl:     spironolactone (ALDACTONE) 25 MG Tab, Take 25 mg by mouth every day., Disp: , Rfl:     naratriptan (AMERGE) 2.5 MG tablet, Take 1 Tablet by mouth as needed for Migraine., Disp: 5 Tablet, Rfl: 3    furosemide (LASIX) 20 MG Tab, Take  1 Tablet by mouth 1 time a day as needed (leg swelling/weight gain)., Disp: 90 Tablet, Rfl: 3    Multiple Vitamins-Minerals (CENTRUM SILVER 50+WOMEN) Tab, Take 2 Tablets by mouth every morning. GUMMIES. (Patient not taking: Reported on 2/9/2023), Disp: , Rfl:     ZINC PICOLINATE PO, Take 1 Tablet by mouth 2 times a day. (Patient not taking: Reported on 4/3/2023), Disp: , Rfl:     Ascorbic Acid (VITAMIN C PO), Take 1 Tablet by mouth 2 times a day. (Patient not taking: Reported on 4/3/2023), Disp: , Rfl:     PROBLEMS:  Patient Active Problem List    Diagnosis Date Noted    Esophageal candidiasis ([facility]) 02/08/2023    Hypomagnesemia 01/16/2023    Sacroiliac joint pain 11/10/2022    Esophageal stricture 10/22/2022    Dysphagia 10/21/2022    Drug-induced lupus erythematosus 09/27/2022    Long-term use of hydroxychloroquine 09/27/2022    Fibromyalgia syndrome 09/27/2022    Positive cardiolipin antibodies (IgA and IgG anti-CL) 07/27/2022    Positive VAHID (1:640 homogenous pattern with negative reflex) 07/27/2022    Inflammatory arthritis 07/27/2022    Mild tetrahydrocannabinol (THC) abuse 06/02/2022    Oropharyngeal dysphagia 06/02/2022    Acute pancreatitis 05/23/2022    Migraine 06/04/2019    FLORES (acute kidney injury) ([facility]) 06/01/2019    Essential hypertension 05/20/2019    SIRS (systemic inflammatory response syndrome) ([facility]) 05/19/2019    History of MRSA infection 12/29/2018    History of infection with vancomycin resistant Enterococcus (VRE) 12/29/2018    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[facility]) 12/28/2018    Anemia 12/15/2018    Hyponatremia 12/12/2018    Leukocytosis 12/12/2018    Chronic respiratory failure with hypoxia ([facility]) 03/20/2018    Anxiety disorder due to multiple medical problems 12/01/2017    DDD (degenerative disc disease), lumbar 04/12/2017    History of DVT (deep vein thrombosis) 11/23/2016    Atrial fibrillation (HCC) 03/26/2015    Sleep apnea 10/26/2014    Postherpetic neuralgia 06/24/2014    Multiple falls 06/24/2014     COPD (chronic obstructive pulmonary disease) ([facility]) 06/24/2014    Rosacea 06/24/2014    Ileostomy in place ([facility]) 06/26/2013    GERD (gastroesophageal reflux disease) 12/05/2012    Status post lumbar surgery 07/16/2012    Crohn's disease of small intestine with complication ([facility]) 09/23/2009    Central sensitization to pain 09/23/2009    Opioid type dependence, continuous (CMS-HCC) 09/23/2009    Degenerative joint disease of cervical and lumbar spine 09/23/2009       REVIEW OF SYSTEMS:  Gen.:  No Nausea, Vomiting, fever, Chills.  Heart: No chest pain.  Lungs:  No shortness of Breath.  Psychological: Rg unusual Anxiety depression     PHYSICAL EXAM   Constitutional: Alert, cooperative, not in acute distress.  Cardiovascular:  Rate Rhythm is regular without murmurs rubs clicks.     Thorax & Lungs: Clear to auscultation, no wheezing, rhonchi, or rales  HENT: Normocephalic, Atraumatic.  Eyes: PERRLA, EOMI, Conjunctiva normal.   Neck: Trachia is midline no swelling of the thyroid.   Lymphatic: No lymphadenopathy noted.   Abdomin: Soft some tenderness ileostomy is not left lower quadrant, no rebound, no guarding.   Skin: Warm, Dry, No erythema, No rash.   Neurologic: Alert & oriented x 3, cranial nerves II through XII are intact, Normal motor function, Normal sensory function, No focal deficits noted.   Psychiatric: Affect normal, Judgment normal, Mood normal.     VITAL SIGNS:/56   Pulse 83   Temp 36.4 °C (97.5 °F) (Temporal)   Resp 12   Ht 1.829 m (6')   Wt 66.7 kg (147 lb)   SpO2 96%   BMI 19.94 kg/m²     Labs: Reviewed    Assessment:                                                     Plan:    1. Acute recurrent maxillary sinusitis  Start Augmentin  - amoxicillin-clavulanate (AUGMENTIN) 875-125 MG Tab; Take 1 Tablet by mouth 2 times a day.  Dispense: 14 Tablet; Refill: 0  - MISCELLANEOUS LAB TEST ([facility]/Other); Future    2. Esophageal stricture  Continue Roxicodone  - oxyCODONE immediate release  (ROXICODONE) 10 MG immediate release tablet; Take 1 Tablet by mouth every 6 hours as needed for Severe Pain (refill #1 of #3.) for up to 30 days.  Dispense: 120 Tablet; Refill: 0  - oxyCODONE immediate release (ROXICODONE) 10 MG immediate release tablet; Take 1 Tablet by mouth every 6 hours as needed for Severe Pain (refill #2 of #3.) for up to 30 days.  Dispense: 30 Tablet; Refill: 0  - oxyCODONE immediate release (ROXICODONE) 10 MG immediate release tablet; Take 1 Tablet by mouth every 6 hours as needed for Severe Pain (Refill #3 of #3.) for up to 30 days.  Dispense: 120 Tablet; Refill: 0  - MISCELLANEOUS LAB TEST ([facility]/Other); Future    3. Ileostomy in place (HCC)  Stable  - oxyCODONE immediate release (ROXICODONE) 10 MG immediate release tablet; Take 1 Tablet by mouth every 6 hours as needed for Severe Pain (refill #1 of #3.) for up to 30 days.  Dispense: 120 Tablet; Refill: 0  - oxyCODONE immediate release (ROXICODONE) 10 MG immediate release tablet; Take 1 Tablet by mouth every 6 hours as needed for Severe Pain (refill #2 of #3.) for up to 30 days.  Dispense: 30 Tablet; Refill: 0  - oxyCODONE immediate release (ROXICODONE) 10 MG immediate release tablet; Take 1 Tablet by mouth every 6 hours as needed for Severe Pain (Refill #3 of #3.) for up to 30 days.  Dispense: 120 Tablet; Refill: 0  - MISCELLANEOUS LAB TEST ([facility]/Other); Future    4. Crohn's disease of small intestine with complication (HCC)  Stable  - oxyCODONE immediate release (ROXICODONE) 10 MG immediate release tablet; Take 1 Tablet by mouth every 6 hours as needed for Severe Pain (refill #1 of #3.) for up to 30 days.  Dispense: 120 Tablet; Refill: 0  - oxyCODONE immediate release (ROXICODONE) 10 MG immediate release tablet; Take 1 Tablet by mouth every 6 hours as needed for Severe Pain (refill #2 of #3.) for up to 30 days.  Dispense: 30 Tablet; Refill: 0  - oxyCODONE immediate release (ROXICODONE) 10 MG immediate release tablet; Take 1 Tablet by  mouth every 6 hours as needed for Severe Pain (Refill #3 of #3.) for up to 30 days.  Dispense: 120 Tablet; Refill: 0  - MISCELLANEOUS LAB TEST ([facility]/Other); Future    5. Anemia, unspecified type  No change in medical therapy  - oxyCODONE immediate release (ROXICODONE) 10 MG immediate release tablet; Take 1 Tablet by mouth every 6 hours as needed for Severe Pain (refill #1 of #3.) for up to 30 days.  Dispense: 120 Tablet; Refill: 0  - oxyCODONE immediate release (ROXICODONE) 10 MG immediate release tablet; Take 1 Tablet by mouth every 6 hours as needed for Severe Pain (refill #2 of #3.) for up to 30 days.  Dispense: 30 Tablet; Refill: 0  - oxyCODONE immediate release (ROXICODONE) 10 MG immediate release tablet; Take 1 Tablet by mouth every 6 hours as needed for Severe Pain (Refill #3 of #3.) for up to 30 days.  Dispense: 120 Tablet; Refill: 0  - MISCELLANEOUS LAB TEST ([facility]/Other); Future

## 2023-05-19 ENCOUNTER — APPOINTMENT (OUTPATIENT)
Dept: RADIOLOGY | Facility: MEDICAL CENTER | Age: 68
End: 2023-05-19
Attending: INTERNAL MEDICINE
Payer: MEDICARE

## 2023-06-12 DIAGNOSIS — Z93.2 ILEOSTOMY IN PLACE (HCC): ICD-10-CM

## 2023-06-12 DIAGNOSIS — K50.019 CROHN'S DISEASE OF SMALL INTESTINE WITH COMPLICATION (HCC): ICD-10-CM

## 2023-06-12 DIAGNOSIS — K22.2 ESOPHAGEAL STRICTURE: ICD-10-CM

## 2023-06-12 DIAGNOSIS — D64.9 ANEMIA, UNSPECIFIED TYPE: ICD-10-CM

## 2023-06-12 RX ORDER — OXYCODONE HYDROCHLORIDE 10 MG/1
10 TABLET ORAL EVERY 6 HOURS PRN
Qty: 120 TABLET | Refills: 0 | Status: CANCELLED | OUTPATIENT
Start: 2023-06-12 | End: 2023-07-12

## 2023-06-12 RX ORDER — OXYCODONE HYDROCHLORIDE 10 MG/1
10 TABLET ORAL EVERY 6 HOURS PRN
Qty: 120 TABLET | Refills: 0 | Status: SHIPPED | OUTPATIENT
Start: 2023-06-12 | End: 2023-08-22 | Stop reason: SDUPTHER

## 2023-07-07 NOTE — RESPIRATORY CARE
Addended by: RAMILA BRADLEY on: 7/7/2023 02:20 PM     Modules accepted: Orders     COPD Education by COPD Clinical Educator  12/13/2018 at 10:30 AM by Georgette Gao    Patient interviewed by COPD education team.  Patient refused full COPD program at this time, but agreed to short intervention.  A comprehensive packet including information about COPD, treatments, and smoking cessation given.

## 2023-07-14 ENCOUNTER — TELEPHONE (OUTPATIENT)
Dept: MEDICAL GROUP | Facility: LAB | Age: 68
End: 2023-07-14
Payer: MEDICARE

## 2023-07-14 DIAGNOSIS — D64.9 ANEMIA, UNSPECIFIED TYPE: ICD-10-CM

## 2023-07-14 DIAGNOSIS — K50.019 CROHN'S DISEASE OF SMALL INTESTINE WITH COMPLICATION (HCC): ICD-10-CM

## 2023-07-14 DIAGNOSIS — Z93.2 ILEOSTOMY IN PLACE (HCC): ICD-10-CM

## 2023-07-14 DIAGNOSIS — K22.2 ESOPHAGEAL STRICTURE: ICD-10-CM

## 2023-07-14 NOTE — TELEPHONE ENCOUNTER
Pt's Oxycodone was only filled for # 30 yesterday.  They will need a new Rx for the # 120 to start on 7/20/2023.

## 2023-07-17 RX ORDER — OXYCODONE HYDROCHLORIDE 10 MG/1
10 TABLET ORAL EVERY 6 HOURS PRN
Qty: 120 TABLET | Refills: 0 | Status: SHIPPED | OUTPATIENT
Start: 2023-07-20 | End: 2023-08-22 | Stop reason: SDUPTHER

## 2023-07-17 NOTE — TELEPHONE ENCOUNTER
Eliz:  Oxycodone 10 mg tablets #120 sent to the patient's pharmacy.      Regards, Chase Charles, DO

## 2023-08-07 DIAGNOSIS — Z79.899 ON POTASSIUM WASTING DIURETIC THERAPY: ICD-10-CM

## 2023-08-14 RX ORDER — POTASSIUM CHLORIDE 20 MEQ/1
TABLET, EXTENDED RELEASE ORAL
Qty: 90 TABLET | Refills: 0 | Status: ON HOLD | OUTPATIENT
Start: 2023-08-14 | End: 2023-10-13

## 2023-08-17 ENCOUNTER — TELEPHONE (OUTPATIENT)
Dept: NEUROLOGY | Facility: MEDICAL CENTER | Age: 68
End: 2023-08-17
Payer: MEDICARE

## 2023-08-17 ENCOUNTER — OFFICE VISIT (OUTPATIENT)
Dept: NEUROLOGY | Facility: MEDICAL CENTER | Age: 68
End: 2023-08-17
Attending: PSYCHIATRY & NEUROLOGY
Payer: MEDICARE

## 2023-08-17 VITALS
WEIGHT: 153.44 LBS | DIASTOLIC BLOOD PRESSURE: 58 MMHG | RESPIRATION RATE: 15 BRPM | SYSTOLIC BLOOD PRESSURE: 112 MMHG | OXYGEN SATURATION: 68 % | BODY MASS INDEX: 20.78 KG/M2 | TEMPERATURE: 97.9 F | HEART RATE: 91 BPM | HEIGHT: 72 IN

## 2023-08-17 DIAGNOSIS — G62.9 PERIPHERAL POLYNEUROPATHY: ICD-10-CM

## 2023-08-17 PROCEDURE — 3074F SYST BP LT 130 MM HG: CPT | Performed by: PSYCHIATRY & NEUROLOGY

## 2023-08-17 PROCEDURE — 3078F DIAST BP <80 MM HG: CPT | Performed by: PSYCHIATRY & NEUROLOGY

## 2023-08-17 PROCEDURE — 99204 OFFICE O/P NEW MOD 45 MIN: CPT | Performed by: PSYCHIATRY & NEUROLOGY

## 2023-08-17 PROCEDURE — 99212 OFFICE O/P EST SF 10 MIN: CPT | Performed by: PSYCHIATRY & NEUROLOGY

## 2023-08-17 RX ORDER — GABAPENTIN 100 MG/1
CAPSULE ORAL
Qty: 333 CAPSULE | Refills: 0 | Status: SHIPPED | OUTPATIENT
Start: 2023-08-17 | End: 2023-09-30

## 2023-08-17 ASSESSMENT — FIBROSIS 4 INDEX: FIB4 SCORE: 2.16

## 2023-08-17 NOTE — PATIENT INSTRUCTIONS
I have ordered a nerve conduction study of the legs     I have prescribed gabapentin for treatment of your nerve pain.

## 2023-08-17 NOTE — PROGRESS NOTES
"Chief Complaint   Patient presents with    New Patient      Dysphagia       History of present illness:  Jana Overton 68 y.o. female with dysphagia.  He has a diagnosis of  Crohn's disease with esophageal stricture.      She has had 2 years of progressive burning on her feet L>R ascending to her heel, associated with R sided back pain radiating down her legs. She had lumbar fusion at L4-5 that did not improve the burning pain.     She feels that she is walking on \"hot sand\" and burning/pins and needles on the bottoms of her feet R>L. The pain can be so severe to prevent her from applying weight to them. Whenever pressure is applied to the bottom of the feet, she experiences pain. This can also wake her up at night.     No regular alcohol intake.     She has had resection of the colon for Crohn's disease.     Past medical history:   Past Medical History:   Diagnosis Date    Anesthesia     Difficult IV stick    Anxiety     Arthritis     all over    ASTHMA     Atrial fib/flut     Backpain     Bronchitis     5 years    Chronic pain     Colostomy in place ([facility]ly [facility])     COPD (chronic obstructive pulmonary disease) (Formerly [facility])     Crohn's disease of colon (Formerly [facility])     Dyspnea     History of cardiac murmur     Ileostomy present ([facility]ly [facility])     Infectious disease     MRSA, VancoRSA    Multiple falls     Narcotic dependence ([facility]ly [facility])     Obstruction     Pain     Pneumonia     child and mid 30's    Postherpetic neuralgia     Rosacea     Sleep apnea        Past surgical history:   Past Surgical History:   Procedure Laterality Date    OK UPPER GI ENDOSCOPY,DIAGNOSIS N/A 2/8/2023    Procedure: GASTROSCOPY;  Surgeon: Jamarcus Davalos M.D.;  Location: SURGERY SAME DAY [facility];  Service: Gastroenterology    OK UPPER GI ENDOSCOPY,W/DILAT,GASTRIC OUT N/A 2/8/2023    Procedure: GASTROSCOPY, WITH BALLOON DILATION;  Surgeon: Jamarcus Davalos M.D.;  Location: SURGERY SAME DAY [facility];  Service: Gastroenterology    OK UPPER GI ENDOSCOPY,BIOPSY N/A 2/8/2023    " Procedure: GASTROSCOPY, WITH BIOPSY;  Surgeon: Jamarcus Davalos M.D.;  Location: SURGERY SAME DAY [facility];  Service: Gastroenterology    NM UPPER GI ENDOSCOPY,DIAGNOSIS N/A 1/16/2023    Procedure: GASTROSCOPY;  Surgeon: Jamarcus Davalos M.D.;  Location: SURGERY SAME DAY [facility];  Service: Gastroenterology    NM UPPER GI ENDOSCOPY,W/DILAT,GASTRIC OUT N/A 1/16/2023    Procedure: GASTROSCOPY, WITH BALLOON DILATION;  Surgeon: Jamarcus Davalos M.D.;  Location: SURGERY SAME DAY [facility];  Service: Gastroenterology    NM UPPER GI ENDOSCOPY,BIOPSY N/A 1/16/2023    Procedure: GASTROSCOPY, WITH BIOPSY;  Surgeon: Jamarcus Davalos M.D.;  Location: SURGERY SAME DAY [facility];  Service: Gastroenterology    NM UPPER GI ENDOSCOPY,DIAGNOSIS N/A 10/24/2022    Procedure: GASTROSCOPY;  Surgeon: Jamarcus Davalos M.D.;  Location: SURGERY SAME DAY [facility];  Service: Gastroenterology    BILIARY DILATATION N/A 10/24/2022    Procedure: DILATION, STRICTURE, BILE DUCT;  Surgeon: Jamarcus Davalos M.D.;  Location: SURGERY SAME DAY [facility];  Service: Gastroenterology    NM CYSTOSCOPY,INSERT URETERAL STENT Right 12/23/2021    Procedure: CYSTOSCOPY, WITH URETERAL STENT EXCHANGE;  Surgeon: Jonathan Turcios M.D.;  Location: [facility];  Service: Urology    NM CYSTO/URETERO/PYELOSCOPY, DX Right 12/23/2021    Procedure: URETEROSCOPY;  Surgeon: Jonathan Turcios M.D.;  Location: [facility];  Service: Urology    NM CYSTO/URETERO/PYELOSCOPY, DX Right 12/8/2021    Procedure: URETEROSCOPY;  Surgeon: Luc Hook M.D.;  Location: [facility];  Service: Urology    CYSTO STENT PLACEMNT PRE SURG Right 12/8/2021    Procedure: CYSTOSCOPY, WITH URETERAL STENT INSERTION;  Surgeon: Luc Hook M.D.;  Location: [facility];  Service: Urology    ILEOSTOMY  1/20/2021    Procedure: ILEOSTOMY- COMPLEX REVISION,;  Surgeon: Kevin Cruz M.D.;  Location: SURGERY [facility];  Service: General    LYSIS ADHESIONS GENERAL  1/20/2021     Procedure: LYSIS, ADHESIONS;  Surgeon: Kevin Cruz M.D.;  Location: SURGERY [facility];  Service: General    GASTROSCOPY N/A 5/22/2019    Procedure: GASTROSCOPY - WITH DILATION;  Surgeon: Dionicio Cardona M.D.;  Location: SURGERY [facility];  Service: Gastroenterology    GASTROSCOPY  1/6/2019    Procedure: GASTROSCOPY- WITH DILATION;  Surgeon: Daniel Daniels M.D.;  Location: SURGERY [facility];  Service: Gastroenterology    ILEOSTOMY  12/29/2018    Procedure: ILEOSTOMY- REVISION;  Surgeon: Kevin Cruz M.D.;  Location: SURGERY [facility];  Service: General    SIGMOIDOSCOPY FLEX  12/29/2018    Procedure: SIGMOIDOSCOPY FLEX;  Surgeon: Kevin Cruz M.D.;  Location: SURGERY [facility];  Service: General    EXPLORATORY LAPAROTOMY  12/29/2018    Procedure: EXPLORATORY LAPAROTOMY, lysis of adhesions;  Surgeon: Kevin Cruz M.D.;  Location: SURGERY [facility];  Service: General    ILEOSTOMY  5/14/2014    Performed by Kevin Cruz M.D. at SURGERY [facility]    MAMMOPLASTY REDUCTION  7/17/2013    Performed by Janey Burt M.D. at SURGERY [facility]    HARDWARE REMOVAL NEURO  7/16/2012    Performed by KEELEY KIM at SURGERY [facility]    GASTROSCOPY  10/4/2011    Performed by TYSON MARTINEZ at SURGERY SAME DAY [facility]    COLONOSCOPY  10/4/2011    Performed by TYSON MARTINEZ at SURGERY SAME DAY [facility]    DILATION AND CURETTAGE  9/24/2010    Performed by GAURAV ALY at SURGERY SAME DAY [facility]    ILEOSTOMY  11/11/2009    Performed by KEVIN CRUZ at [facility]    GASTROSCOPY WITH BIOPSY  11/22/08    Performed by NEGRITA HUYNH at [facility]    ABDOMINAL EXPLORATION      CERVICAL DISK AND FUSION ANTERIOR      COLON RESECTION      FOOT SURGERY      HAND SURGERY      LUMBAR FUSION ANTERIOR      LUMPECTOMY      OTHER ABDOMINAL SURGERY      surgery for chrons disease    [place] BREAST REDUCTION         Family  history:   Family History   Problem Relation Age of Onset    Lung Disease Mother         copd    Diabetes Father     Heart Disease Father     Diabetes Sister     Cancer Maternal Aunt 40        breast       Social history:   Social History     Socioeconomic History    Marital status:      Spouse name: Not on file    Number of children: Not on file    Years of education: Not on file    Highest education level: Associate degree: academic program   Occupational History    Not on file   Tobacco Use    Smoking status: Never    Smokeless tobacco: Never    Tobacco comments:     second hand smoke parents - smoked for only 1 year many years ago   Vaping Use    Vaping Use: Every day    Substances: THC   Substance and Sexual Activity    Alcohol use: Not Currently     Alcohol/week: 0.6 oz     Types: 1 Shots of liquor per week     Comment: gin and half a lime; tonic water    Drug use: Yes     Types: Marijuana, Inhaled     Comment: medical marijuana through bong/vape    Sexual activity: Not on file     Comment:    Other Topics Concern    Not on file   Social History Narrative    Not on file     Social Determinants of Health     Financial Resource Strain: Unknown (1/15/2023)    Overall Financial Resource Strain (CARDIA)     Difficulty of Paying Living Expenses: Patient refused   Food Insecurity: Unknown (1/15/2023)    Hunger Vital Sign     Worried About Running Out of Food in the Last Year: Patient refused     Ran Out of Food in the Last Year: Patient refused   Transportation Needs: Unknown (1/15/2023)    PRAPARE - Transportation     Lack of Transportation (Medical): Patient refused     Lack of Transportation (Non-Medical): Patient refused   Physical Activity: Sufficiently Active (1/15/2023)    Exercise Vital Sign     Days of Exercise per Week: 7 days     Minutes of Exercise per Session: 60 min   Stress: No Stress Concern Present (11/4/2021)    [employer] [place] [employer] - Occupational Stress  Questionnaire     Feeling of Stress : Not at all   Social Connections: Socially Integrated (1/15/2023)    Social Connection and Isolation Panel [NHANES]     Frequency of Communication with Friends and Family: Three times a week     Frequency of Social Gatherings with Friends and Family: Once a week     Attends Orthodox Services: More than 4 times per year     Active Member of Clubs or Organizations: Yes     Attends Club or Organization Meetings: More than 4 times per year     Marital Status:    Intimate Partner Violence: Not on file   Housing Stability: Unknown (1/15/2023)    Housing Stability Vital Sign     Unable to Pay for Housing in the Last Year: Patient refused     Number of Places Lived in the Last Year: 1     Unstable Housing in the Last Year: No       Current medications:   Current Outpatient Medications   Medication    gabapentin (NEURONTIN) 100 MG Cap    potassium chloride SA (KDUR) 20 MEQ Tab CR    oxyCODONE immediate release (ROXICODONE) 10 MG immediate release tablet    traZODone (DESYREL) 50 MG Tab    hydroxychloroquine (PLAQUENIL) 200 MG Tab    prochlorperazine (COMPAZINE) 10 MG Tab    ondansetron (ZOFRAN ODT) 4 MG TABLET DISPERSIBLE    granisetron (KYTRIL) 1 MG Tab    nystatin (MYCOSTATIN) 124692 UNIT/ML Suspension    diazePAM (VALIUM) 5 MG Tab    metoprolol tartrate (LOPRESSOR) 50 MG Tab    levalbuterol (XOPENEX HFA) 45 MCG/ACT inhaler    Multiple Vitamins-Minerals (CENTRUM SILVER 50+WOMEN) Tab    Ascorbic Acid (VITAMIN C PO)    Biotin w/ Vitamins C & E (HAIR SKIN & NAILS GUMMIES PO)    spironolactone (ALDACTONE) 25 MG Tab    naratriptan (AMERGE) 2.5 MG tablet    furosemide (LASIX) 20 MG Tab    amoxicillin-clavulanate (AUGMENTIN) 875-125 MG Tab    azelastine (ASTELIN) 137 MCG/SPRAY nasal spray    fluticasone (FLONASE) 50 MCG/ACT nasal spray    ZINC PICOLINATE PO     No current facility-administered medications for this visit.       Medication Allergy:  Allergies   Allergen Reactions     "Demerol Hcl [Meperidine] Shortness of Breath and Palpitations    Methotrexate Hives, Shortness of Breath and Rash          Morphine Shortness of Breath and Palpitations    Promethazine Shortness of Breath and Rash          Remicade [Infliximab] Hives, Shortness of Breath and Rash          Sudafed [Pseudoephedrine] Shortness of Breath, Vomiting and Palpitations    Azathioprine Sodium Hives and Shortness of Breath     10/21/2022 - patient unable to verify allergy/reaction  Historical reaction listed: Hives, Shortness of Breath    Carafate [Sucralfate] Vomiting and Nausea     + Mouth Sores    Other Drug Unspecified     \"STEROIDS\" - REACTION NOT SPECIFIED    Sulfa Drugs Rash          Sulfasalazine Rash          Azathioprine Hives    Amitriptyline Unspecified     Nightmares      Ativan [Lorazepam] Unspecified     Nightmares    Betamethasone Unspecified     10/21/2022 - patient unable to verify allergy/reaction  No historical reaction listed    Fentanyl Unspecified     \"Patches didn't work\" and skin broke down    Lyrica [Pregabalin] Nausea          Methadone Unspecified     \"Didn't work\"    Potassium Unspecified     Notes: In IV only  REACTION NOT SPECIFIED    Tizanidine Unspecified     10/21/2022 - patient unable to verify allergy/reaction  No historical reaction listed    Toradol [Ketorolac Tromethamine] Unspecified     10/21/2022 - patient unable to verify allergy/reaction, states she was told by her doctor not to take       Physical examination:   Vitals:    08/17/23 1256   BP: 112/58   BP Location: Left arm   Patient Position: Sitting   BP Cuff Size: Adult   Pulse: 91   Resp: 15   Temp: 36.6 °C (97.9 °F)   TempSrc: Temporal   SpO2: (!) 68%   Weight: 69.6 kg (153 lb 7 oz)   Height: 1.829 m (6')     Neurological Exam    Motor                                               Right                     Left  Hip abduction                         5                          5  Hip adduction                         5            "               5  Knee flexion                           5                          5  Knee extension                      5                          5  Ankle inversion                      4                          4  Plantarflexion                         4                          4  Dorsiflexion                            4                          4    Sensory  Light touch abnormality: +Allodynia to plantar stimulation   Diminished sensation to LT distal to the mid-shin.     Reflexes                                            Right                      Left  Patellar                                1+                         1+  Achilles                                0                         0  Right Plantar: downgoing  Left Plantar: downgoing    Coordination  Right: Finger-to-nose normal.Left: Finger-to-nose normal.    Gait  Casual gait: Wide stance. Ataxic gait. Tandem gait abnormality: Romberg is present.  Unable to tandem walk .      Labs:  I reviewed the following labs personally:  Lab Results   Component Value Date/Time    HBA1C 5.3 08/27/2015 01:50 PM      B12: 487  TSH: 0.864  Spep: normal    Imaging:   None     ASSESSMENT AND PLAN:  Problem List Items Addressed This Visit    None  Visit Diagnoses       Peripheral polyneuropathy        Relevant Medications    gabapentin (NEURONTIN) 100 MG Cap    Other Relevant Orders    Referral to Neurodiagnostics (EEG,EP,EMG/NCS/DBS)            1. Peripheral polyneuropathy  - Referral to Neurodiagnostics (EEG,EP,EMG/NCS/DBS)  - gabapentin (NEURONTIN) 100 MG Cap; Take 1 Capsule by mouth 3 times a day for 7 days, THEN 2 Capsules 3 times a day for 7 days, THEN 3 Capsules 3 times a day for 30 days.  Dispense: 333 Capsule; Refill: 0    68-year-old female with history of Crohn's disease with chronic lower extremity paresthesias.  She has weakness of the distal lower extremities on examination, with weakness of dorsiflexion and plantarflexion.  She also has an ataxic gait,  with a positive Romberg suggesting a sensory ataxia.  Due to the positive physical exam findings, I have ordered a nerve conduction study of the lower extremities.  Given her history of Crohn's, I do want to evaluate for the presence of demyelination of the peripheral nerves.   I have prescribed gabapentin for treatment of the neuropathic pain.     FOLLOW-UP:   Return in about 3 months (around 11/17/2023).    Total time spent for the day for this patient unrelated to procedure time is: 33 minutes. I spent 26 minutes in face to face time and I spent 3 minutes pre-charting and 3 minutes in post-visit documentation.      Dr. Scott Stein D.O.  [facility]

## 2023-08-19 ENCOUNTER — HOSPITAL ENCOUNTER (OUTPATIENT)
Facility: MEDICAL CENTER | Age: 68
End: 2023-08-19
Attending: UROLOGY
Payer: MEDICARE

## 2023-08-19 PROCEDURE — 83945 ASSAY OF OXALATE: CPT

## 2023-08-19 PROCEDURE — 82340 ASSAY OF CALCIUM IN URINE: CPT

## 2023-08-19 PROCEDURE — 82507 ASSAY OF CITRATE: CPT

## 2023-08-19 PROCEDURE — 84560 ASSAY OF URINE/URIC ACID: CPT

## 2023-08-19 PROCEDURE — 82570 ASSAY OF URINE CREATININE: CPT

## 2023-08-22 ENCOUNTER — OFFICE VISIT (OUTPATIENT)
Dept: MEDICAL GROUP | Facility: LAB | Age: 68
End: 2023-08-22
Payer: MEDICARE

## 2023-08-22 VITALS
DIASTOLIC BLOOD PRESSURE: 62 MMHG | TEMPERATURE: 98 F | HEART RATE: 75 BPM | OXYGEN SATURATION: 97 % | SYSTOLIC BLOOD PRESSURE: 118 MMHG | WEIGHT: 148 LBS | BODY MASS INDEX: 20.05 KG/M2 | HEIGHT: 72 IN | RESPIRATION RATE: 16 BRPM

## 2023-08-22 DIAGNOSIS — Z93.2 ILEOSTOMY IN PLACE (HCC): ICD-10-CM

## 2023-08-22 DIAGNOSIS — K22.2 ESOPHAGEAL STRICTURE: ICD-10-CM

## 2023-08-22 DIAGNOSIS — D64.9 ANEMIA, UNSPECIFIED TYPE: ICD-10-CM

## 2023-08-22 DIAGNOSIS — K50.019 CROHN'S DISEASE OF SMALL INTESTINE WITH COMPLICATION (HCC): ICD-10-CM

## 2023-08-22 PROCEDURE — 99214 OFFICE O/P EST MOD 30 MIN: CPT | Performed by: INTERNAL MEDICINE

## 2023-08-22 PROCEDURE — 3078F DIAST BP <80 MM HG: CPT | Performed by: INTERNAL MEDICINE

## 2023-08-22 PROCEDURE — 3074F SYST BP LT 130 MM HG: CPT | Performed by: INTERNAL MEDICINE

## 2023-08-22 RX ORDER — OXYCODONE HYDROCHLORIDE 10 MG/1
10 TABLET ORAL EVERY 6 HOURS PRN
Qty: 120 TABLET | Refills: 0 | Status: SHIPPED | OUTPATIENT
Start: 2023-08-22 | End: 2023-09-21

## 2023-08-22 RX ORDER — OXYCODONE HYDROCHLORIDE 10 MG/1
10 TABLET ORAL EVERY 6 HOURS PRN
Qty: 120 TABLET | Refills: 0 | Status: ON HOLD | OUTPATIENT
Start: 2023-10-21 | End: 2023-10-26

## 2023-08-22 RX ORDER — OXYCODONE HYDROCHLORIDE 10 MG/1
10 TABLET ORAL EVERY 6 HOURS PRN
Qty: 120 TABLET | Refills: 0 | Status: SHIPPED | OUTPATIENT
Start: 2023-11-20 | End: 2023-12-22 | Stop reason: SDUPTHER

## 2023-08-22 ASSESSMENT — FIBROSIS 4 INDEX: FIB4 SCORE: 2.16

## 2023-08-22 NOTE — PROGRESS NOTES
CC: Jana Overton is a 68 y.o. female is suffering from   Chief Complaint   Patient presents with    Follow-Up         SUBJECTIVE:  1. Crohn's disease of small intestine with complication (HCC)  Patient suffers from Crohn's disease small intestine with significant complications    2. Esophageal stricture  History of esophageal stricture    3. Ileostomy in place (HCC)  Ileostomy in place    4. Anemia, unspecified type  Continued anemia        Past social, family, history:   Social History     Tobacco Use    Smoking status: Never    Smokeless tobacco: Never    Tobacco comments:     second hand smoke parents - smoked for only 1 year many years ago   Vaping Use    Vaping Use: Every day    Substances: THC   Substance Use Topics    Alcohol use: Not Currently     Alcohol/week: 0.6 oz     Types: 1 Shots of liquor per week     Comment: gin and half a lime; tonic water    Drug use: Yes     Types: Marijuana, Inhaled     Comment: medical marijuana through bong/vape         MEDICATIONS:    Current Outpatient Medications:     [START ON 11/20/2023] oxyCODONE immediate release (ROXICODONE) 10 MG immediate release tablet, Take 1 Tablet by mouth every 6 hours as needed for Severe Pain (Refill #3 of #3.) for up to 30 days., Disp: 120 Tablet, Rfl: 0    [START ON 10/21/2023] oxyCODONE immediate release (ROXICODONE) 10 MG immediate release tablet, Take 1 Tablet by mouth every 6 hours as needed for Severe Pain (refill #2 of #3.) for up to 30 days., Disp: 120 Tablet, Rfl: 0    oxyCODONE immediate release (ROXICODONE) 10 MG immediate release tablet, Take 1 Tablet by mouth every 6 hours as needed for Severe Pain (refill #1 of #3.) for up to 30 days., Disp: 120 Tablet, Rfl: 0    gabapentin (NEURONTIN) 100 MG Cap, Take 1 Capsule by mouth 3 times a day for 7 days, THEN 2 Capsules 3 times a day for 7 days, THEN 3 Capsules 3 times a day for 30 days., Disp: 333 Capsule, Rfl: 0    potassium chloride SA (KDUR) 20 MEQ Tab CR, TAKE 1 TABLET  BY MOUTH ONCE DAILY AS NEEDED(WHEN YOU TAKE LASIX), Disp: 90 Tablet, Rfl: 0    amoxicillin-clavulanate (AUGMENTIN) 875-125 MG Tab, Take 1 Tablet by mouth 2 times a day., Disp: 14 Tablet, Rfl: 0    traZODone (DESYREL) 50 MG Tab, Take 1 Tablet by mouth every evening., Disp: 90 Tablet, Rfl: 3    azelastine (ASTELIN) 137 MCG/SPRAY nasal spray, Administer 1 Spray into affected nostril(S) 2 times a day. (Patient not taking: Reported on 8/17/2023), Disp: 30 mL, Rfl: 3    fluticasone (FLONASE) 50 MCG/ACT nasal spray, Administer 1 Spray into affected nostril(S) every day. (Patient not taking: Reported on 8/17/2023), Disp: 16 g, Rfl: 5    hydroxychloroquine (PLAQUENIL) 200 MG Tab, Take 1.5 Tablets by mouth every day., Disp: 135 Tablet, Rfl: 3    prochlorperazine (COMPAZINE) 10 MG Tab, TAKE ONE TABLET BY MOUTH ONE TIME DAY AS NEEDED FOR NAUSEA AND VOMITING, Disp: 20 Tablet, Rfl: 0    ondansetron (ZOFRAN ODT) 4 MG TABLET DISPERSIBLE, Take 1 Tablet by mouth every 6 hours as needed for Nausea/Vomiting., Disp: 20 Tablet, Rfl: 5    granisetron (KYTRIL) 1 MG Tab, Take 1 Tablet by mouth every 12 hours as needed for Nausea/Vomiting., Disp: 10 Tablet, Rfl: 0    nystatin (MYCOSTATIN) 060808 UNIT/ML Suspension, Take 5 mL by mouth 4 times a day., Disp: 60 mL, Rfl: 1    diazePAM (VALIUM) 5 MG Tab, Take 5 mg by mouth every 6 hours as needed (SPASMS)., Disp: , Rfl:     metoprolol tartrate (LOPRESSOR) 50 MG Tab, Take 1 Tablet by mouth 2 times a day., Disp: 180 Tablet, Rfl: 2    levalbuterol (XOPENEX HFA) 45 MCG/ACT inhaler, Inhale 2 Puffs every four hours as needed for Shortness of Breath (As needed for shortness of breath, cough, wheezing.)., Disp: 1 Each, Rfl: 11    Multiple Vitamins-Minerals (CENTRUM SILVER 50+WOMEN) Tab, Take 2 Tablets by mouth every morning. GUMMIES., Disp: , Rfl:     ZINC PICOLINATE PO, Take 1 Tablet by mouth 2 times a day. (Patient not taking: Reported on 8/17/2023), Disp: , Rfl:     Ascorbic Acid (VITAMIN C PO), Take  1 Tablet by mouth 2 times a day., Disp: , Rfl:     Biotin w/ Vitamins C & E (HAIR SKIN & NAILS GUMMIES PO), Take 2 Tablets by mouth every morning., Disp: , Rfl:     spironolactone (ALDACTONE) 25 MG Tab, Take 25 mg by mouth every day., Disp: , Rfl:     naratriptan (AMERGE) 2.5 MG tablet, Take 1 Tablet by mouth as needed for Migraine., Disp: 5 Tablet, Rfl: 3    furosemide (LASIX) 20 MG Tab, Take 1 Tablet by mouth 1 time a day as needed (leg swelling/weight gain)., Disp: 90 Tablet, Rfl: 3    PROBLEMS:  Patient Active Problem List    Diagnosis Date Noted    Esophageal candidiasis (HCC) 02/08/2023    Hypomagnesemia 01/16/2023    Sacroiliac joint pain 11/10/2022    Esophageal stricture 10/22/2022    Dysphagia 10/21/2022    Drug-induced lupus erythematosus 09/27/2022    Long-term use of hydroxychloroquine 09/27/2022    Fibromyalgia syndrome 09/27/2022    Positive cardiolipin antibodies (IgA and IgG anti-CL) 07/27/2022    Positive VAHID (1:640 homogenous pattern with negative reflex) 07/27/2022    Inflammatory arthritis 07/27/2022    Mild tetrahydrocannabinol (THC) abuse 06/02/2022    Oropharyngeal dysphagia 06/02/2022    Acute pancreatitis 05/23/2022    Migraine 06/04/2019    FLORES (acute kidney injury) ([facility]) 06/01/2019    Essential hypertension 05/20/2019    SIRS (systemic inflammatory response syndrome) ([facility]) 05/19/2019    History of MRSA infection 12/29/2018    History of infection with vancomycin resistant Enterococcus (VRE) 12/29/2018    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[facility]) 12/28/2018    Anemia 12/15/2018    Hyponatremia 12/12/2018    Leukocytosis 12/12/2018    Chronic respiratory failure with hypoxia ([facility]) 03/20/2018    Anxiety disorder due to multiple medical problems 12/01/2017    DDD (degenerative disc disease), lumbar 04/12/2017    History of DVT (deep vein thrombosis) 11/23/2016    Atrial fibrillation (HCC) 03/26/2015    Sleep apnea 10/26/2014    Postherpetic neuralgia 06/24/2014    Multiple falls 06/24/2014    COPD (chronic  obstructive pulmonary disease) ([facility]) 06/24/2014    Rosacea 06/24/2014    Ileostomy in place ([facility]) 06/26/2013    GERD (gastroesophageal reflux disease) 12/05/2012    Status post lumbar surgery 07/16/2012    Crohn's disease of small intestine with complication ([facility]) 09/23/2009    Central sensitization to pain 09/23/2009    Opioid type dependence, continuous ([facility]) 09/23/2009    Degenerative joint disease of cervical and lumbar spine 09/23/2009       REVIEW OF SYSTEMS:  Gen.:  No Nausea, Vomiting, fever, Chills.  Heart: No chest pain.  Lungs:  No shortness of Breath.  Psychological: Rg unusual Anxiety depression     PHYSICAL EXAM   Constitutional: Alert, cooperative, not in acute distress.  Cardiovascular:  Rate Rhythm is regular without murmurs rubs clicks.     Thorax & Lungs: Clear to auscultation, no wheezing, rhonchi, or rales  HENT: Normocephalic, Atraumatic.  Eyes: PERRLA, EOMI, Conjunctiva normal.   Neck: Trachia is midline no swelling of the thyroid.   Lymphatic: No lymphadenopathy noted.   Neurologic: Alert & oriented x 3, cranial nerves II through XII are intact, Normal motor function, Normal sensory function, No focal deficits noted.   Psychiatric: Affect normal, Judgment normal, Mood normal.     VITAL SIGNS:/62 (BP Location: Left arm, Patient Position: Sitting, BP Cuff Size: Adult)   Pulse 75   Temp 36.7 °C (98 °F) (Temporal)   Resp 16   Ht 1.829 m (6')   Wt 67.1 kg (148 lb)   SpO2 97%   BMI 20.07 kg/m²     Labs: Reviewed    Assessment:                                                     Plan:    1. Crohn's disease of small intestine with complication ([facility])  Continue Roxicodone  - Comp Metabolic Panel; Future  - oxyCODONE immediate release (ROXICODONE) 10 MG immediate release tablet; Take 1 Tablet by mouth every 6 hours as needed for Severe Pain (Refill #3 of #3.) for up to 30 days.  Dispense: 120 Tablet; Refill: 0  - oxyCODONE immediate release (ROXICODONE) 10 MG immediate release  tablet; Take 1 Tablet by mouth every 6 hours as needed for Severe Pain (refill #2 of #3.) for up to 30 days.  Dispense: 120 Tablet; Refill: 0  - oxyCODONE immediate release (ROXICODONE) 10 MG immediate release tablet; Take 1 Tablet by mouth every 6 hours as needed for Severe Pain (refill #1 of #3.) for up to 30 days.  Dispense: 120 Tablet; Refill: 0    2. Esophageal stricture  No change in medical therapy State drug task force reviewed  - oxyCODONE immediate release (ROXICODONE) 10 MG immediate release tablet; Take 1 Tablet by mouth every 6 hours as needed for Severe Pain (Refill #3 of #3.) for up to 30 days.  Dispense: 120 Tablet; Refill: 0  - oxyCODONE immediate release (ROXICODONE) 10 MG immediate release tablet; Take 1 Tablet by mouth every 6 hours as needed for Severe Pain (refill #2 of #3.) for up to 30 days.  Dispense: 120 Tablet; Refill: 0  - oxyCODONE immediate release (ROXICODONE) 10 MG immediate release tablet; Take 1 Tablet by mouth every 6 hours as needed for Severe Pain (refill #1 of #3.) for up to 30 days.  Dispense: 120 Tablet; Refill: 0    3. Ileostomy in place ([facility])  State drug task force reviewed  - oxyCODONE immediate release (ROXICODONE) 10 MG immediate release tablet; Take 1 Tablet by mouth every 6 hours as needed for Severe Pain (Refill #3 of #3.) for up to 30 days.  Dispense: 120 Tablet; Refill: 0  - oxyCODONE immediate release (ROXICODONE) 10 MG immediate release tablet; Take 1 Tablet by mouth every 6 hours as needed for Severe Pain (refill #2 of #3.) for up to 30 days.  Dispense: 120 Tablet; Refill: 0  - oxyCODONE immediate release (ROXICODONE) 10 MG immediate release tablet; Take 1 Tablet by mouth every 6 hours as needed for Severe Pain (refill #1 of #3.) for up to 30 days.  Dispense: 120 Tablet; Refill: 0    4. Anemia, unspecified type  Stable  - oxyCODONE immediate release (ROXICODONE) 10 MG immediate release tablet; Take 1 Tablet by mouth every 6 hours as needed for Severe Pain (Refill  #3 of #3.) for up to 30 days.  Dispense: 120 Tablet; Refill: 0  - oxyCODONE immediate release (ROXICODONE) 10 MG immediate release tablet; Take 1 Tablet by mouth every 6 hours as needed for Severe Pain (refill #2 of #3.) for up to 30 days.  Dispense: 120 Tablet; Refill: 0  - oxyCODONE immediate release (ROXICODONE) 10 MG immediate release tablet; Take 1 Tablet by mouth every 6 hours as needed for Severe Pain (refill #1 of #3.) for up to 30 days.  Dispense: 120 Tablet; Refill: 0

## 2023-08-24 ENCOUNTER — HOSPITAL ENCOUNTER (OUTPATIENT)
Dept: RADIOLOGY | Facility: MEDICAL CENTER | Age: 68
End: 2023-08-24
Attending: STUDENT IN AN ORGANIZED HEALTH CARE EDUCATION/TRAINING PROGRAM
Payer: MEDICARE

## 2023-08-24 DIAGNOSIS — N20.0 CALCULUS OF KIDNEY: ICD-10-CM

## 2023-08-24 PROCEDURE — 74176 CT ABD & PELVIS W/O CONTRAST: CPT

## 2023-08-25 ENCOUNTER — APPOINTMENT (OUTPATIENT)
Dept: RADIOLOGY | Facility: MEDICAL CENTER | Age: 68
End: 2023-08-25
Attending: INTERNAL MEDICINE
Payer: MEDICARE

## 2023-08-25 DIAGNOSIS — Z12.31 ENCOUNTER FOR SCREENING MAMMOGRAM FOR MALIGNANT NEOPLASM OF BREAST: ICD-10-CM

## 2023-08-25 PROCEDURE — 77063 BREAST TOMOSYNTHESIS BI: CPT

## 2023-08-26 LAB
CALCIUM 24H UR-MCNC: 7.7 MG/DL
CALCIUM 24H UR-MRATE: 108 MG/D (ref 100–250)
CITRATE 24H UR-MCNC: 35 MG/L
CITRATE 24H UR-MRATE: 49 MG/D (ref 320–1240)
COLLECT DURATION TIME SPEC: 24 HRS
CREAT 24H UR-MCNC: 73 MG/DL
CREAT 24H UR-MRATE: 1022 MG/D (ref 500–1400)
OXALATE 24H UR-MCNC: 12 MG/L
OXALATE 24H UR-MRATE: 17 MG/D (ref 13–40)
TOTAL VOLUME 1105: 16 ML
URATE 24H UR-MCNC: 16.8 MG/DL
URATE 24H UR-MRATE: 235 MG/D (ref 250–750)

## 2023-09-12 NOTE — PROCEDURE: ADDITIONAL NOTES
Patient ID: Ashley Leone is a 18 y.o. female is being seen for consultation today at the request of No ref. 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  Developed significant back pain even on her off days.  Continues to have some back pain especially in her mid lumbar back which she describes as a swelling sensation that she feels goes up and down intermittently throughout the day.  She has some pinching of her nerves around her right buttocks area and was prescribed gabapentin by her PCP.    While in the room and during my examination of the patient I wore a mask and eye protection.  I washed my hands before and after this patient encounter.  The patient was also wearing a mask.    The following portions of the patient's history were reviewed and updated as appropriate: allergies, current medications, past family history, past medical history, past social history, past surgical history and problem list.    Review of Systems   Constitutional:  Negative for fever.   Musculoskeletal:  Positive for back pain.   Neurological:  Negative for weakness and numbness.      Past Medical History:   Diagnosis Date    Abdominal pain 11/2022    every day since 1-2022    Acute sinus infection 11/2022    Blisters of multiple sites     inner thigh, but healing    Diarrhea 11/2022    Ear infection 11/2022    double    Family history of colitis     Nausea & vomiting 11/2022    on and off       Allergies   Allergen Reactions    Latex, Natural Rubber Other (See Comments)     Blisters that leave scars       No family history on file.    Social History     Socioeconomic History    Marital status: Single   Tobacco 
Use    Smoking status: Never    Smokeless tobacco: Never   Vaping Use    Vaping Use: Every day    Substances: Nicotine, THC, do not vape dos, stop thc now   Substance and Sexual Activity    Alcohol use: Yes     Comment: rarely    Drug use: Yes     Types: Marijuana     Comment: vapes thc       Past Surgical History:   Procedure Laterality Date    ADENOIDECTOMY      ATRIAL SEPTAL DEFECT REPAIR  2005    COLONOSCOPY N/A 12/6/2022    Procedure: COLONOSCOPY WITH BIOPSY X1 AREA;  Surgeon: Woodrow Valentino MD;  Location: [facility] ENDOSCOPY;  Service: Gastroenterology;  Laterality: N/A;  POST: NORMAL COLONOSCOPY    ENDOSCOPY N/A 12/6/2022    Procedure: ESOPHAGOGASTRODUODENOSCOPY WITH BIOPSY X 2 AREAS;  Surgeon: Woodrow Valentino MD;  Location: [facility] ENDOSCOPY;  Service: Gastroenterology;  Laterality: N/A;  POST: ESOPHAGITIS    TONSILLECTOMY           Objective     Vitals:    09/12/23 1000   BP: 124/80     Body mass index is 24.24 kg/m².    Physical Exam  Constitutional:       Appearance: Normal appearance.   HENT:      Head: Normocephalic and atraumatic.   Eyes:      Extraocular Movements: Extraocular movements intact.      Conjunctiva/sclera: Conjunctivae normal.      Pupils: Pupils are equal, round, and reactive to light.   Cardiovascular:      Rate and Rhythm: Normal rate and regular rhythm.      Pulses: Normal pulses.   Pulmonary:      Breath sounds: Normal breath sounds.   Abdominal:      Palpations: Abdomen is soft.   Musculoskeletal:         General: Normal range of motion.      Cervical back: Normal range of motion and neck supple.   Skin:     General: Skin is warm and dry.   Neurological:      Mental Status: She is alert and oriented to person, place, and time.      Cranial Nerves: Cranial nerves 2-12 are intact.      Motor: Motor function is intact. No weakness or atrophy.      Coordination: Coordination is intact. Romberg sign negative. Romberg Test normal.      Gait: Gait is intact. Gait normal. 
     Deep Tendon Reflexes: Reflexes are normal and symmetric.      Reflex Scores:       Tricep reflexes are 2+ on the right side and 2+ on the left side.       Bicep reflexes are 2+ on the right side and 2+ on the left side.       Brachioradialis reflexes are 2+ on the right side and 2+ on the left side.       Patellar reflexes are 2+ on the right side and 2+ on the left side.       Achilles reflexes are 2+ on the right side and 2+ on the left side.  Psychiatric:         Speech: Speech normal.       Neurologic Exam     Mental Status   Oriented to person, place, and time.   Attention: normal. Concentration: normal.   Speech: speech is normal   Level of consciousness: alert    Cranial Nerves   Cranial nerves II through XII intact.     CN III, IV, VI   Pupils are equal, round, and reactive to light.    Motor Exam   Muscle bulk: normal  Overall muscle tone: normal    Strength   Strength 5/5 except as noted.     Sensory Exam   Light touch normal.     Gait, Coordination, and Reflexes     Gait  Gait: normal    Coordination   Romberg: negative    Reflexes   Reflexes 2+ except as noted.   Right brachioradialis: 2+  Left brachioradialis: 2+  Right biceps: 2+  Left biceps: 2+  Right triceps: 2+  Left triceps: 2+  Right patellar: 2+  Left patellar: 2+  Right achilles: 2+  Left achilles: 2+    Assessment & Plan   Independent Review of Radiographic Studies:      I personally reviewed the images from the following studies.    I personally reviewed all images from the following studies:  MR: MRI of the thoracic spine wo contrast was reviewed and shows a ovoid T2 hyperintense signal eccentric to the left side which appears to be connected to a flow void on the right lateral recess.  This mass is located dorsal to the spinal cord at T8 and extends slightly past the disc spaces superior and inferior to the T8 vertebral body.  Does not appear to be causing any significant mass effect on the spinal cord although there is some mild 
ventral flattening.  Does not contrast enhance.  MRI of the lumbar spine wo contrast was reviewed and shows normal-appearing lumbar spine    Assessment/Plan: Ashley has normal strength and reflexes and no evidence of myelopathy or spinal cord compression on her examination.  She has some localized tenderness along her mid lumbar spine which is not associated with her mid thoracic lesion found on her MRI.  Her MRI findings could represent an arachnoid cyst or potentially artifact due to CSF pulsation, there is no contrast-enhanced enhancement that would indicate a meningioma or a schwannoma.  Given the lack of compression on the spinal cord, even if it is an arachnoid cyst I do not think that she requires operative intervention at this point.  No evidence of syrinx on the MRI.  My recommendation would be physical therapy to strengthen her back muscles and improve her ergonomics at work and follow-up in 6 months with an MRI with and without contrast.    Medical Decision Making:      Repeat MRI with and without contrast of the thoracic spine in 6 months  Physical therapy         Diagnoses and all orders for this visit:    1. Arachnoid cyst of spine (Primary)  -     MRI Thoracic Spine With & Without Contrast; Future  -     Ambulatory Referral to Physical Therapy             Patient Instructions/Recommendations:    Follow-up in 6 months with MRI      Montana Hawk MD  09/12/23  10:35 EDT        
Additional Notes: Dr. dans lip balm or Vaniply Oint.  If no help, RTC.
Detail Level: Simple
Additional Notes: Discussed oral medication allergy to Sulfa.  Pt feels she would tolerated topicals fine.  Samples given to use BID.

## 2023-09-19 ENCOUNTER — APPOINTMENT (OUTPATIENT)
Dept: RHEUMATOLOGY | Facility: MEDICAL CENTER | Age: 68
End: 2023-09-19
Attending: INTERNAL MEDICINE
Payer: MEDICARE

## 2023-09-26 ENCOUNTER — APPOINTMENT (RX ONLY)
Dept: URBAN - METROPOLITAN AREA CLINIC 20 | Facility: CLINIC | Age: 68
Setting detail: DERMATOLOGY
End: 2023-09-26

## 2023-09-26 DIAGNOSIS — L82.0 INFLAMED SEBORRHEIC KERATOSIS: ICD-10-CM

## 2023-09-26 DIAGNOSIS — Z85.828 PERSONAL HISTORY OF OTHER MALIGNANT NEOPLASM OF SKIN: ICD-10-CM

## 2023-09-26 DIAGNOSIS — Z71.89 OTHER SPECIFIED COUNSELING: ICD-10-CM

## 2023-09-26 DIAGNOSIS — L82.1 OTHER SEBORRHEIC KERATOSIS: ICD-10-CM

## 2023-09-26 DIAGNOSIS — L81.4 OTHER MELANIN HYPERPIGMENTATION: ICD-10-CM

## 2023-09-26 DIAGNOSIS — L71.8 OTHER ROSACEA: ICD-10-CM | Status: WELL CONTROLLED

## 2023-09-26 DIAGNOSIS — L57.0 ACTINIC KERATOSIS: ICD-10-CM

## 2023-09-26 DIAGNOSIS — D18.0 HEMANGIOMA: ICD-10-CM

## 2023-09-26 DIAGNOSIS — D22 MELANOCYTIC NEVI: ICD-10-CM

## 2023-09-26 PROBLEM — D22.71 MELANOCYTIC NEVI OF RIGHT LOWER LIMB, INCLUDING HIP: Status: ACTIVE | Noted: 2023-09-26

## 2023-09-26 PROBLEM — D22.5 MELANOCYTIC NEVI OF TRUNK: Status: ACTIVE | Noted: 2023-09-26

## 2023-09-26 PROBLEM — D18.01 HEMANGIOMA OF SKIN AND SUBCUTANEOUS TISSUE: Status: ACTIVE | Noted: 2023-09-26

## 2023-09-26 PROBLEM — D22.62 MELANOCYTIC NEVI OF LEFT UPPER LIMB, INCLUDING SHOULDER: Status: ACTIVE | Noted: 2023-09-26

## 2023-09-26 PROBLEM — D22.72 MELANOCYTIC NEVI OF LEFT LOWER LIMB, INCLUDING HIP: Status: ACTIVE | Noted: 2023-09-26

## 2023-09-26 PROBLEM — D22.61 MELANOCYTIC NEVI OF RIGHT UPPER LIMB, INCLUDING SHOULDER: Status: ACTIVE | Noted: 2023-09-26

## 2023-09-26 PROBLEM — D22.39 MELANOCYTIC NEVI OF OTHER PARTS OF FACE: Status: ACTIVE | Noted: 2023-09-26

## 2023-09-26 PROCEDURE — ? LIQUID NITROGEN

## 2023-09-26 PROCEDURE — 17110 DESTRUCTION B9 LES UP TO 14: CPT

## 2023-09-26 PROCEDURE — 17003 DESTRUCT PREMALG LES 2-14: CPT | Mod: 59

## 2023-09-26 PROCEDURE — ? ADDITIONAL NOTES

## 2023-09-26 PROCEDURE — ? COUNSELING

## 2023-09-26 PROCEDURE — 17000 DESTRUCT PREMALG LESION: CPT | Mod: 59

## 2023-09-26 PROCEDURE — ? SUNSCREEN RECOMMENDATIONS

## 2023-09-26 PROCEDURE — 99213 OFFICE O/P EST LOW 20 MIN: CPT | Mod: 25

## 2023-09-26 ASSESSMENT — LOCATION ZONE DERM
LOCATION ZONE: FACE
LOCATION ZONE: ARM
LOCATION ZONE: HAND
LOCATION ZONE: LEG
LOCATION ZONE: TRUNK

## 2023-09-26 ASSESSMENT — LOCATION DETAILED DESCRIPTION DERM
LOCATION DETAILED: LEFT SUPERIOR MEDIAL MIDBACK
LOCATION DETAILED: RIGHT PROXIMAL PRETIBIAL REGION
LOCATION DETAILED: LEFT DISTAL POSTERIOR THIGH
LOCATION DETAILED: RIGHT DISTAL POSTERIOR THIGH
LOCATION DETAILED: RIGHT SUPERIOR LATERAL UPPER BACK
LOCATION DETAILED: RIGHT MID-UPPER BACK
LOCATION DETAILED: RIGHT FOREHEAD
LOCATION DETAILED: RIGHT RADIAL DORSAL HAND
LOCATION DETAILED: RIGHT MEDIAL UPPER BACK
LOCATION DETAILED: RIGHT VENTRAL PROXIMAL FOREARM
LOCATION DETAILED: LEFT SUPERIOR MEDIAL MALAR CHEEK
LOCATION DETAILED: LEFT LATERAL EYEBROW
LOCATION DETAILED: RIGHT INFERIOR CENTRAL MALAR CHEEK
LOCATION DETAILED: MIDDLE STERNUM
LOCATION DETAILED: LEFT FOREHEAD
LOCATION DETAILED: RIGHT INFERIOR FOREHEAD
LOCATION DETAILED: RIGHT CENTRAL MALAR CHEEK
LOCATION DETAILED: RIGHT INFERIOR MEDIAL UPPER BACK
LOCATION DETAILED: INFERIOR THORACIC SPINE
LOCATION DETAILED: LEFT VENTRAL PROXIMAL FOREARM
LOCATION DETAILED: RIGHT INFERIOR MEDIAL MIDBACK
LOCATION DETAILED: LEFT PROXIMAL POSTERIOR UPPER ARM
LOCATION DETAILED: LEFT LATERAL PROXIMAL PRETIBIAL REGION
LOCATION DETAILED: RIGHT LATERAL MALAR CHEEK
LOCATION DETAILED: RIGHT SUPERIOR UPPER BACK
LOCATION DETAILED: RIGHT DISTAL POSTERIOR UPPER ARM
LOCATION DETAILED: LEFT INFERIOR CENTRAL MALAR CHEEK

## 2023-09-26 ASSESSMENT — LOCATION SIMPLE DESCRIPTION DERM
LOCATION SIMPLE: RIGHT PRETIBIAL REGION
LOCATION SIMPLE: RIGHT FOREHEAD
LOCATION SIMPLE: LEFT CHEEK
LOCATION SIMPLE: LEFT FOREARM
LOCATION SIMPLE: RIGHT HAND
LOCATION SIMPLE: RIGHT BACK
LOCATION SIMPLE: RIGHT FOREARM
LOCATION SIMPLE: LEFT LOWER BACK
LOCATION SIMPLE: LEFT PRETIBIAL REGION
LOCATION SIMPLE: RIGHT POSTERIOR UPPER ARM
LOCATION SIMPLE: RIGHT LOWER BACK
LOCATION SIMPLE: RIGHT POSTERIOR THIGH
LOCATION SIMPLE: LEFT FOREHEAD
LOCATION SIMPLE: LEFT POSTERIOR THIGH
LOCATION SIMPLE: LEFT EYEBROW
LOCATION SIMPLE: UPPER BACK
LOCATION SIMPLE: LEFT POSTERIOR UPPER ARM
LOCATION SIMPLE: RIGHT CHEEK
LOCATION SIMPLE: RIGHT UPPER BACK
LOCATION SIMPLE: CHEST

## 2023-09-26 NOTE — PROCEDURE: ADDITIONAL NOTES
Detail Level: Detailed
Additional Notes: Well controlled with azelaic acid, no refills needed today
Render Risk Assessment In Note?: no

## 2023-09-27 ENCOUNTER — PATIENT MESSAGE (OUTPATIENT)
Dept: MEDICAL GROUP | Facility: LAB | Age: 68
End: 2023-09-27
Payer: MEDICARE

## 2023-09-27 DIAGNOSIS — F06.8 ANXIETY DISORDER DUE TO MULTIPLE MEDICAL PROBLEMS: ICD-10-CM

## 2023-09-27 DIAGNOSIS — L93.2 DRUG-INDUCED LUPUS ERYTHEMATOSUS: ICD-10-CM

## 2023-09-27 DIAGNOSIS — G89.29 CENTRAL SENSITIZATION TO PAIN: ICD-10-CM

## 2023-09-27 DIAGNOSIS — T50.905A DRUG-INDUCED LUPUS ERYTHEMATOSUS: ICD-10-CM

## 2023-09-27 RX ORDER — DIAZEPAM 5 MG/1
5 TABLET ORAL EVERY 6 HOURS PRN
Qty: 30 TABLET | Refills: 3 | Status: SHIPPED | OUTPATIENT
Start: 2023-09-27 | End: 2023-10-27

## 2023-09-28 ENCOUNTER — TELEPHONE (OUTPATIENT)
Dept: CARDIOLOGY | Facility: MEDICAL CENTER | Age: 68
End: 2023-09-28

## 2023-09-28 DIAGNOSIS — I50.32 CHRONIC DIASTOLIC CONGESTIVE HEART FAILURE (HCC): ICD-10-CM

## 2023-09-28 DIAGNOSIS — M79.89 LEG SWELLING: ICD-10-CM

## 2023-09-28 RX ORDER — SPIRONOLACTONE 25 MG/1
25 TABLET ORAL DAILY
Qty: 90 TABLET | Refills: 2 | Status: ON HOLD | OUTPATIENT
Start: 2023-09-28 | End: 2023-10-26

## 2023-09-28 NOTE — TELEPHONE ENCOUNTER
AB    Caller: Jana Overton     Medication Name and Dosage: spironolactone (ALDACTONE) 25 MG     Medication amount left: 4 tabs    Preferred Pharmacy: [id number]    Callback Number (Will only call for issues): [phone] (home)      Thank you,     Catherine ROCHA

## 2023-10-02 ENCOUNTER — HOSPITAL ENCOUNTER (EMERGENCY)
Facility: MEDICAL CENTER | Age: 68
End: 2023-10-02
Attending: EMERGENCY MEDICINE
Payer: MEDICARE

## 2023-10-02 ENCOUNTER — OFFICE VISIT (OUTPATIENT)
Dept: MEDICAL GROUP | Facility: LAB | Age: 68
End: 2023-10-02
Payer: MEDICARE

## 2023-10-02 VITALS
BODY MASS INDEX: 20.03 KG/M2 | OXYGEN SATURATION: 98 % | WEIGHT: 147.71 LBS | SYSTOLIC BLOOD PRESSURE: 157 MMHG | RESPIRATION RATE: 18 BRPM | DIASTOLIC BLOOD PRESSURE: 76 MMHG | HEART RATE: 84 BPM | TEMPERATURE: 98 F

## 2023-10-02 DIAGNOSIS — Z93.2 ILEOSTOMY IN PLACE (HCC): ICD-10-CM

## 2023-10-02 DIAGNOSIS — E86.0 DEHYDRATION: ICD-10-CM

## 2023-10-02 DIAGNOSIS — R11.2 NAUSEA AND VOMITING, UNSPECIFIED VOMITING TYPE: ICD-10-CM

## 2023-10-02 DIAGNOSIS — G89.29 CHRONIC ABDOMINAL PAIN: ICD-10-CM

## 2023-10-02 DIAGNOSIS — K50.019 CROHN'S DISEASE OF SMALL INTESTINE WITH COMPLICATION (HCC): ICD-10-CM

## 2023-10-02 DIAGNOSIS — R10.9 CHRONIC ABDOMINAL PAIN: ICD-10-CM

## 2023-10-02 LAB
ALBUMIN SERPL BCP-MCNC: 4.8 G/DL (ref 3.2–4.9)
ALBUMIN/GLOB SERPL: 1.5 G/DL
ALP SERPL-CCNC: 96 U/L (ref 30–99)
ALT SERPL-CCNC: 17 U/L (ref 2–50)
ANION GAP SERPL CALC-SCNC: 15 MMOL/L (ref 7–16)
AST SERPL-CCNC: 23 U/L (ref 12–45)
BASOPHILS # BLD AUTO: 1 % (ref 0–1.8)
BASOPHILS # BLD: 0.07 K/UL (ref 0–0.12)
BILIRUB SERPL-MCNC: 0.7 MG/DL (ref 0.1–1.5)
BUN SERPL-MCNC: 15 MG/DL (ref 8–22)
CALCIUM ALBUM COR SERPL-MCNC: 10 MG/DL (ref 8.5–10.5)
CALCIUM SERPL-MCNC: 10.6 MG/DL (ref 8.4–10.2)
CHLORIDE SERPL-SCNC: 104 MMOL/L (ref 96–112)
CO2 SERPL-SCNC: 23 MMOL/L (ref 20–33)
CREAT SERPL-MCNC: 1.2 MG/DL (ref 0.5–1.4)
EOSINOPHIL # BLD AUTO: 0.05 K/UL (ref 0–0.51)
EOSINOPHIL NFR BLD: 0.7 % (ref 0–6.9)
ERYTHROCYTE [DISTWIDTH] IN BLOOD BY AUTOMATED COUNT: 39.3 FL (ref 35.9–50)
GFR SERPLBLD CREATININE-BSD FMLA CKD-EPI: 49 ML/MIN/1.73 M 2
GLOBULIN SER CALC-MCNC: 3.2 G/DL (ref 1.9–3.5)
GLUCOSE SERPL-MCNC: 102 MG/DL (ref 65–99)
HCT VFR BLD AUTO: 41.1 % (ref 37–47)
HGB BLD-MCNC: 13.9 G/DL (ref 12–16)
IMM GRANULOCYTES # BLD AUTO: 0.02 K/UL (ref 0–0.11)
IMM GRANULOCYTES NFR BLD AUTO: 0.3 % (ref 0–0.9)
LIPASE SERPL-CCNC: 15 U/L (ref 11–82)
LYMPHOCYTES # BLD AUTO: 1.67 K/UL (ref 1–4.8)
LYMPHOCYTES NFR BLD: 23.6 % (ref 22–41)
MCH RBC QN AUTO: 30.7 PG (ref 27–33)
MCHC RBC AUTO-ENTMCNC: 33.8 G/DL (ref 32.2–35.5)
MCV RBC AUTO: 90.7 FL (ref 81.4–97.8)
MONOCYTES # BLD AUTO: 0.81 K/UL (ref 0–0.85)
MONOCYTES NFR BLD AUTO: 11.4 % (ref 0–13.4)
NEUTROPHILS # BLD AUTO: 4.47 K/UL (ref 1.82–7.42)
NEUTROPHILS NFR BLD: 63 % (ref 44–72)
NRBC # BLD AUTO: 0 K/UL
NRBC BLD-RTO: 0 /100 WBC (ref 0–0.2)
PLATELET # BLD AUTO: 240 K/UL (ref 164–446)
PMV BLD AUTO: 10.2 FL (ref 9–12.9)
POTASSIUM SERPL-SCNC: 4 MMOL/L (ref 3.6–5.5)
PROT SERPL-MCNC: 8 G/DL (ref 6–8.2)
RBC # BLD AUTO: 4.53 M/UL (ref 4.2–5.4)
SODIUM SERPL-SCNC: 142 MMOL/L (ref 135–145)
WBC # BLD AUTO: 7.1 K/UL (ref 4.8–10.8)

## 2023-10-02 PROCEDURE — 85025 COMPLETE CBC W/AUTO DIFF WBC: CPT

## 2023-10-02 PROCEDURE — 700105 HCHG RX REV CODE 258: Performed by: EMERGENCY MEDICINE

## 2023-10-02 PROCEDURE — 96375 TX/PRO/DX INJ NEW DRUG ADDON: CPT

## 2023-10-02 PROCEDURE — 80053 COMPREHEN METABOLIC PANEL: CPT

## 2023-10-02 PROCEDURE — 83690 ASSAY OF LIPASE: CPT

## 2023-10-02 PROCEDURE — 99285 EMERGENCY DEPT VISIT HI MDM: CPT

## 2023-10-02 PROCEDURE — 96374 THER/PROPH/DIAG INJ IV PUSH: CPT

## 2023-10-02 PROCEDURE — 700111 HCHG RX REV CODE 636 W/ 250 OVERRIDE (IP): Mod: JZ | Performed by: EMERGENCY MEDICINE

## 2023-10-02 PROCEDURE — 36415 COLL VENOUS BLD VENIPUNCTURE: CPT

## 2023-10-02 RX ORDER — HYDROMORPHONE HYDROCHLORIDE 1 MG/ML
1 INJECTION, SOLUTION INTRAMUSCULAR; INTRAVENOUS; SUBCUTANEOUS ONCE
Status: COMPLETED | OUTPATIENT
Start: 2023-10-02 | End: 2023-10-02

## 2023-10-02 RX ORDER — SODIUM CHLORIDE 9 MG/ML
1000 INJECTION, SOLUTION INTRAVENOUS ONCE
Status: COMPLETED | OUTPATIENT
Start: 2023-10-02 | End: 2023-10-02

## 2023-10-02 RX ORDER — ONDANSETRON 4 MG/1
4 TABLET, FILM COATED ORAL EVERY 4 HOURS PRN
Qty: 20 TABLET | Refills: 5 | Status: ON HOLD | OUTPATIENT
Start: 2023-10-02 | End: 2023-10-13

## 2023-10-02 RX ORDER — ONDANSETRON 2 MG/ML
4 INJECTION INTRAMUSCULAR; INTRAVENOUS ONCE
Status: COMPLETED | OUTPATIENT
Start: 2023-10-02 | End: 2023-10-02

## 2023-10-02 RX ORDER — METHYLPREDNISOLONE 4 MG/1
TABLET ORAL
Qty: 21 TABLET | Refills: 3 | Status: ON HOLD | OUTPATIENT
Start: 2023-10-02 | End: 2023-10-26

## 2023-10-02 RX ADMIN — HYDROMORPHONE HYDROCHLORIDE 1 MG: 1 INJECTION, SOLUTION INTRAMUSCULAR; INTRAVENOUS; SUBCUTANEOUS at 16:43

## 2023-10-02 RX ADMIN — SODIUM CHLORIDE 1000 ML: 9 INJECTION, SOLUTION INTRAVENOUS at 16:43

## 2023-10-02 RX ADMIN — ONDANSETRON 4 MG: 2 INJECTION INTRAMUSCULAR; INTRAVENOUS at 16:43

## 2023-10-02 ASSESSMENT — FIBROSIS 4 INDEX: FIB4 SCORE: 2.16

## 2023-10-02 NOTE — ED NOTES
Attempted to start PIV and medicate pt.  Pt requesting US PIV placement only.  Pt aware pending availability of US.

## 2023-10-02 NOTE — PROGRESS NOTES
Telephone Appointment Visit    This telephone visit was initiated by the patient and they verbally consented.    Reason for Call:  Vomiting x 20 x 24 hours Last 6:30 this am.  Recommend patient be transported to the [facility] emergency room via [facility].  Discussed with [facility]    HPI:    Current issues associate with nausea vomiting x20 over the last 24 hours, is running out of Zofran, prescription will be sent to her pharmacy, discussed with the patient that she has the same issues approximately same time each year.    Labs / Images Reviewed:         Assessment and Plan:     1. Nausea and vomiting, unspecified vomiting type    2. Dehydration  - ondansetron (ZOFRAN) 4 MG Tab tablet; Take 1 Tablet by mouth every four hours as needed for Nausea/Vomiting for up to 30 days.  Dispense: 20 Tablet; Refill: 5  - methylPREDNISolone (MEDROL DOSEPAK) 4 MG Tablet Therapy Pack; As directed on the packaging label.  Dispense: 21 Tablet; Refill: 3    3. Crohn's disease of small intestine with complication (HCC)  - methylPREDNISolone (MEDROL DOSEPAK) 4 MG Tablet Therapy Pack; As directed on the packaging label.  Dispense: 21 Tablet; Refill: 3    4. Ileostomy in place (HCC)      Follow-up: After emergency room visit    Total Time Spent (mins): 13:30      Chase Charles D.O.

## 2023-10-02 NOTE — ED PROVIDER NOTES
"ED Provider Note    CHIEF COMPLAINT  Chief Complaint   Patient presents with    Abdominal Pain       EXTERNAL RECORDS REVIEWED  Outpatient Notes conversation with primary care doctor noted from earlier today, patient was referred to the emergency department    HPI/ROS  LIMITATION TO HISTORY   Select: : None  OUTSIDE HISTORIAN(S):  None    Jana Overton is a 68 y.o. female who presents stating \"40-year history of Crohn's disease\", states she has been having difficulty tolerating oral intake, vomiting with home Zofran not working.  She states she feels dehydrated.  She states she spoke with her primary care doctor via video conference morning and was told to go to the emergency department.  Review of her chart confirms this discussion was had.  Patient lists allergies to every pain medication with the exception of Dilaudid.  She feels a recent trial of Lyrica caused a degradation of her health and is now suffering the consequences, requesting IV fluids, IV Zofran and Dilaudid for pain.  She states she is currently in between GI providers.  No bloody emesis.  No acute chest pain.  No shortness of breath or fever.    PAST MEDICAL HISTORY   has a past medical history of Anesthesia, Anxiety, Arthritis, ASTHMA, Atrial fib/flut, Backpain, Bronchitis, Chronic pain, Colostomy in place (HCC), COPD (chronic obstructive pulmonary disease) (HCC), Crohn's disease of colon (HCC), Dyspnea, History of cardiac murmur, Ileostomy present (HCC), Infectious disease, Multiple falls, Narcotic dependence (HCC), Obstruction, Pain, Pneumonia, Postherpetic neuralgia, Rosacea, and Sleep apnea.    SURGICAL HISTORY   has a past surgical history that includes lumbar fusion anterior; cervical disk and fusion anterior; foot surgery; hand surgery; other abdominal surgery; gastroscopy with biopsy (11/22/08); ileostomy (11/11/2009); dilation and curettage (9/24/2010); gastroscopy (10/4/2011); colonoscopy (10/4/2011); hardware removal neuro " (7/16/2012); mammoplasty reduction (7/17/2013); ileostomy (5/14/2014); breast reduction; abdominal exploration; lumpectomy; colon resection; ileostomy (12/29/2018); sigmoidoscopy flex (12/29/2018); exploratory laparotomy (12/29/2018); gastroscopy (1/6/2019); gastroscopy (N/A, 5/22/2019); ileostomy (1/20/2021); lysis adhesions general (1/20/2021); cysto/uretero/pyeloscopy, dx (Right, 12/8/2021); cysto stent placemnt pre surg (Right, 12/8/2021); cystoscopy,insert ureteral stent (Right, 12/23/2021); cysto/uretero/pyeloscopy, dx (Right, 12/23/2021); upper gi endoscopy,diagnosis (N/A, 10/24/2022); biliary dilatation (N/A, 10/24/2022); upper gi endoscopy,diagnosis (N/A, 1/16/2023); upper gi endoscopy,w/dilat,gastric out (N/A, 1/16/2023); upper gi endoscopy,biopsy (N/A, 1/16/2023); upper gi endoscopy,diagnosis (N/A, 2/8/2023); upper gi endoscopy,w/dilat,gastric out (N/A, 2/8/2023); and upper gi endoscopy,biopsy (N/A, 2/8/2023).    FAMILY HISTORY  Family History   Problem Relation Age of Onset    Lung Disease Mother         copd    Diabetes Father     Heart Disease Father     Diabetes Sister     Cancer Maternal Aunt 40        breast       SOCIAL HISTORY  Social History     Tobacco Use    Smoking status: Never    Smokeless tobacco: Never    Tobacco comments:     second hand smoke parents - smoked for only 1 year many years ago   Vaping Use    Vaping Use: Every day    Substances: THC   Substance and Sexual Activity    Alcohol use: Not Currently     Alcohol/week: 0.6 oz     Types: 1 Shots of liquor per week     Comment: gin and half a lime; tonic water    Drug use: Yes     Types: Marijuana, Inhaled     Comment: medical marijuana through bong/vape    Sexual activity: Not on file     Comment:        CURRENT MEDICATIONS  Home Medications    **Home medications have not yet been reviewed for this encounter**         ALLERGIES  Allergies   Allergen Reactions    Demerol Hcl [Meperidine] Shortness of Breath and Palpitations     "Methotrexate Hives, Shortness of Breath and Rash          Morphine Shortness of Breath and Palpitations    Promethazine Shortness of Breath and Rash          Remicade [Infliximab] Hives, Shortness of Breath and Rash          Sudafed [Pseudoephedrine] Shortness of Breath, Vomiting and Palpitations    Azathioprine Sodium Hives and Shortness of Breath     10/21/2022 - patient unable to verify allergy/reaction  Historical reaction listed: Hives, Shortness of Breath    Carafate [Sucralfate] Vomiting and Nausea     + Mouth Sores    Other Drug Unspecified     \"STEROIDS\" - REACTION NOT SPECIFIED    Sulfa Drugs Rash          Sulfasalazine Rash          Azathioprine Hives    Amitriptyline Unspecified     Nightmares      Ativan [Lorazepam] Unspecified     Nightmares    Betamethasone Unspecified     10/21/2022 - patient unable to verify allergy/reaction  No historical reaction listed    Fentanyl Unspecified     \"Patches didn't work\" and skin broke down    Lyrica [Pregabalin] Nausea          Methadone Unspecified     \"Didn't work\"    Potassium Unspecified     Notes: In IV only  REACTION NOT SPECIFIED    Tizanidine Unspecified     10/21/2022 - patient unable to verify allergy/reaction  No historical reaction listed    Toradol [Ketorolac Tromethamine] Unspecified     10/21/2022 - patient unable to verify allergy/reaction, states she was told by her doctor not to take       PHYSICAL EXAM  VITAL SIGNS: BP (!) 162/82   Pulse (!) 111   Temp 36.7 °C (98 °F) (Temporal)   Resp 18   SpO2 98%    GI: Diffuse abdominal pain, no distention, no guarding  Psychiatric: At times mildly anxious.  cooperative, normal mood  Respiratory: Clear lung sounds  Cardiac: Tachycardic rate, regular rhythm  Skin: No jaundice  Neurologic: Speech clear, face expression symmetric, strength intact      DIAGNOSTIC STUDIES / PROCEDURES    LABS  Results for orders placed or performed during the hospital encounter of 10/02/23   CBC WITH DIFFERENTIAL   Result " Value Ref Range    WBC 7.1 4.8 - 10.8 K/uL    RBC 4.53 4.20 - 5.40 M/uL    Hemoglobin 13.9 12.0 - 16.0 g/dL    Hematocrit 41.1 37.0 - 47.0 %    MCV 90.7 81.4 - 97.8 fL    MCH 30.7 27.0 - 33.0 pg    MCHC 33.8 32.2 - 35.5 g/dL    RDW 39.3 35.9 - 50.0 fL    Platelet Count 240 164 - 446 K/uL    MPV 10.2 9.0 - 12.9 fL    Neutrophils-Polys 63.00 44.00 - 72.00 %    Lymphocytes 23.60 22.00 - 41.00 %    Monocytes 11.40 0.00 - 13.40 %    Eosinophils 0.70 0.00 - 6.90 %    Basophils 1.00 0.00 - 1.80 %    Immature Granulocytes 0.30 0.00 - 0.90 %    Nucleated RBC 0.00 0.00 - 0.20 /100 WBC    Neutrophils (Absolute) 4.47 1.82 - 7.42 K/uL    Lymphs (Absolute) 1.67 1.00 - 4.80 K/uL    Monos (Absolute) 0.81 0.00 - 0.85 K/uL    Eos (Absolute) 0.05 0.00 - 0.51 K/uL    Baso (Absolute) 0.07 0.00 - 0.12 K/uL    Immature Granulocytes (abs) 0.02 0.00 - 0.11 K/uL    NRBC (Absolute) 0.00 K/uL   COMP METABOLIC PANEL   Result Value Ref Range    Sodium 142 135 - 145 mmol/L    Potassium 4.0 3.6 - 5.5 mmol/L    Chloride 104 96 - 112 mmol/L    Co2 23 20 - 33 mmol/L    Anion Gap 15.0 7.0 - 16.0    Glucose 102 (H) 65 - 99 mg/dL    Bun 15 8 - 22 mg/dL    Creatinine 1.20 0.50 - 1.40 mg/dL    Calcium 10.6 (H) 8.4 - 10.2 mg/dL    Correct Calcium 10.0 8.5 - 10.5 mg/dL    AST(SGOT) 23 12 - 45 U/L    ALT(SGPT) 17 2 - 50 U/L    Alkaline Phosphatase 96 30 - 99 U/L    Total Bilirubin 0.7 0.1 - 1.5 mg/dL    Albumin 4.8 3.2 - 4.9 g/dL    Total Protein 8.0 6.0 - 8.2 g/dL    Globulin 3.2 1.9 - 3.5 g/dL    A-G Ratio 1.5 g/dL   LIPASE   Result Value Ref Range    Lipase 15 11 - 82 U/L   ESTIMATED GFR   Result Value Ref Range    GFR (CKD-EPI) 49 (A) >60 mL/min/1.73 m 2     *Note: Due to a large number of results and/or encounters for the requested time period, some results have not been displayed. A complete set of results can be found in Results Review.             COURSE & MEDICAL DECISION MAKING    ED Observation Status? Yes; I am placing the patient in to an  observation status due to a diagnostic uncertainty as well as therapeutic intensity. Patient placed in observation status at 305 PM, 10/2/2023.     Observation plan is as follows: Serial exam, laboratory evaluation and therapeutics for the evaluation and treatment of abdominal pain    Upon Reevaluation, the patient's condition has: Improved; and will be discharged.    Patient discharged from ED Observation status at 5:18 PM (Time) October 2, 2023 (Date).     INITIAL ASSESSMENT, COURSE AND PLAN  Care Narrative: Patient presents with signs and symptoms of dehydration, states she is having difficulty with oral intake secondary to poor pain control.  She states she has had similar symptoms now for 40 years secondary to her Crohn's disease.  Despite patient's claims of allergies to all pain medication except for Dilaudid, review of her chart did not show frequent visits to our emergency department therefore given the benefit of the doubt and given IV dose of Dilaudid.  Clinically she appeared better, able to tolerate oral intake at this time.  She was given IV hydration at her request as well as with evidence of dehydration on exam.  Also treated with IV Zofran, she stated her nausea has now resolved.  With her tolerating oral intake, pain improved, she is discharged home, advised to continue her usual medications and follow-up with her primary doctor for recheck soon as possible.  Laboratory evaluation did not show acute kidney injury, no hepatitis, normal white and red blood cell count.  Her labs are reassuring and at this time looks well.  HYDRATION: Based on the patient's presentation of Dehydration the patient was given IV fluids. IV Hydration was used because oral hydration was not adequate alone. Upon recheck following hydration, the patient was improving.  HTN/IDDM FOLLOW UP:  The patient has known hypertension and is being followed by their primary care doctor      ADDITIONAL PROBLEM LIST  Reported history of  Crohn's disease: Patient currently does not have GI specialist, stated she would obtain referral from her primary care doctor    DISPOSITION AND DISCUSSIONS    Escalation of care considered, and ultimately not performed:diagnostic imaging and acute inpatient care management, however at this time, the patient is most appropriate for outpatient management      Decision tools and prescription drugs considered including, but not limited to: Medication modification no modification of her medications at this time, she is to continue her usual medication as prescribed by her physician .    FINAL DIAGNOSIS  1. Nausea and vomiting, unspecified vomiting type    2. Chronic abdominal pain    3. Dehydration           Electronically signed by: Seven Rebollar M.D., 10/2/2023 3:16 PM

## 2023-10-05 ENCOUNTER — OFFICE VISIT (OUTPATIENT)
Dept: MEDICAL GROUP | Facility: LAB | Age: 68
End: 2023-10-05
Payer: MEDICARE

## 2023-10-05 VITALS
OXYGEN SATURATION: 94 % | HEART RATE: 91 BPM | RESPIRATION RATE: 12 BRPM | TEMPERATURE: 98.4 F | HEIGHT: 72 IN | WEIGHT: 146 LBS | DIASTOLIC BLOOD PRESSURE: 66 MMHG | BODY MASS INDEX: 19.77 KG/M2 | SYSTOLIC BLOOD PRESSURE: 124 MMHG

## 2023-10-05 DIAGNOSIS — Z23 NEED FOR VACCINATION: ICD-10-CM

## 2023-10-05 DIAGNOSIS — R11.14 BILIOUS VOMITING WITH NAUSEA: ICD-10-CM

## 2023-10-05 PROCEDURE — 99213 OFFICE O/P EST LOW 20 MIN: CPT | Mod: 25 | Performed by: INTERNAL MEDICINE

## 2023-10-05 PROCEDURE — 90662 IIV NO PRSV INCREASED AG IM: CPT | Performed by: INTERNAL MEDICINE

## 2023-10-05 PROCEDURE — 3074F SYST BP LT 130 MM HG: CPT | Performed by: INTERNAL MEDICINE

## 2023-10-05 PROCEDURE — 3078F DIAST BP <80 MM HG: CPT | Performed by: INTERNAL MEDICINE

## 2023-10-05 PROCEDURE — G0008 ADMIN INFLUENZA VIRUS VAC: HCPCS | Performed by: INTERNAL MEDICINE

## 2023-10-05 RX ORDER — PROCHLORPERAZINE MALEATE 10 MG
10 TABLET ORAL EVERY 6 HOURS PRN
Qty: 30 TABLET | Refills: 3 | Status: SHIPPED | OUTPATIENT
Start: 2023-10-05

## 2023-10-05 ASSESSMENT — FIBROSIS 4 INDEX: FIB4 SCORE: 1.63

## 2023-10-06 ENCOUNTER — APPOINTMENT (OUTPATIENT)
Dept: MEDICAL GROUP | Facility: LAB | Age: 68
End: 2023-10-06
Payer: MEDICARE

## 2023-10-06 NOTE — PROGRESS NOTES
CC: Jana Overton is a 68 y.o. female is suffering from   Chief Complaint   Patient presents with    Hospital Follow-up         SUBJECTIVE:  1. Bilious vomiting with nausea  Is here for follow-up, is still having problems with bilious vomiting, will need to have repeat IV if necessary    2. Need for vaccination  Influenza vaccination given        Past social, family, history:   Social History     Tobacco Use    Smoking status: Never    Smokeless tobacco: Never    Tobacco comments:     second hand smoke parents - smoked for only 1 year many years ago   Vaping Use    Vaping Use: Every day    Substances: THC   Substance Use Topics    Alcohol use: Not Currently     Alcohol/week: 0.6 oz     Types: 1 Shots of liquor per week     Comment: gin and half a lime; tonic water    Drug use: Yes     Types: Marijuana, Inhaled     Comment: medical marijuana through bong/vape         MEDICATIONS:    Current Outpatient Medications:     prochlorperazine (COMPAZINE) 10 MG Tab, Take 1 Tablet by mouth every 6 hours as needed for Nausea/Vomiting., Disp: 30 Tablet, Rfl: 3    ondansetron (ZOFRAN) 4 MG Tab tablet, Take 1 Tablet by mouth every four hours as needed for Nausea/Vomiting for up to 30 days., Disp: 20 Tablet, Rfl: 5    methylPREDNISolone (MEDROL DOSEPAK) 4 MG Tablet Therapy Pack, As directed on the packaging label., Disp: 21 Tablet, Rfl: 3    spironolactone (ALDACTONE) 25 MG Tab, Take 1 Tablet by mouth every day., Disp: 90 Tablet, Rfl: 2    diazePAM (VALIUM) 5 MG Tab, Take 1 Tablet by mouth every 6 hours as needed (SPASMS) for up to 30 days., Disp: 30 Tablet, Rfl: 3    [START ON 10/21/2023] oxyCODONE immediate release (ROXICODONE) 10 MG immediate release tablet, Take 1 Tablet by mouth every 6 hours as needed for Severe Pain (refill #2 of #3.) for up to 30 days., Disp: 120 Tablet, Rfl: 0    potassium chloride SA (KDUR) 20 MEQ Tab CR, TAKE 1 TABLET BY MOUTH ONCE DAILY AS NEEDED(WHEN YOU TAKE LASIX), Disp: 90 Tablet, Rfl: 0     traZODone (DESYREL) 50 MG Tab, Take 1 Tablet by mouth every evening., Disp: 90 Tablet, Rfl: 3    hydroxychloroquine (PLAQUENIL) 200 MG Tab, Take 1.5 Tablets by mouth every day., Disp: 135 Tablet, Rfl: 3    ondansetron (ZOFRAN ODT) 4 MG TABLET DISPERSIBLE, Take 1 Tablet by mouth every 6 hours as needed for Nausea/Vomiting., Disp: 20 Tablet, Rfl: 5    granisetron (KYTRIL) 1 MG Tab, Take 1 Tablet by mouth every 12 hours as needed for Nausea/Vomiting., Disp: 10 Tablet, Rfl: 0    nystatin (MYCOSTATIN) 988027 UNIT/ML Suspension, Take 5 mL by mouth 4 times a day., Disp: 60 mL, Rfl: 1    metoprolol tartrate (LOPRESSOR) 50 MG Tab, Take 1 Tablet by mouth 2 times a day., Disp: 180 Tablet, Rfl: 2    levalbuterol (XOPENEX HFA) 45 MCG/ACT inhaler, Inhale 2 Puffs every four hours as needed for Shortness of Breath (As needed for shortness of breath, cough, wheezing.)., Disp: 1 Each, Rfl: 11    Multiple Vitamins-Minerals (CENTRUM SILVER 50+WOMEN) Tab, Take 2 Tablets by mouth every morning. GUMMIES., Disp: , Rfl:     Ascorbic Acid (VITAMIN C PO), Take 1 Tablet by mouth 2 times a day., Disp: , Rfl:     Biotin w/ Vitamins C & E (HAIR SKIN & NAILS GUMMIES PO), Take 2 Tablets by mouth every morning., Disp: , Rfl:     naratriptan (AMERGE) 2.5 MG tablet, Take 1 Tablet by mouth as needed for Migraine., Disp: 5 Tablet, Rfl: 3    furosemide (LASIX) 20 MG Tab, Take 1 Tablet by mouth 1 time a day as needed (leg swelling/weight gain)., Disp: 90 Tablet, Rfl: 3    [START ON 11/20/2023] oxyCODONE immediate release (ROXICODONE) 10 MG immediate release tablet, Take 1 Tablet by mouth every 6 hours as needed for Severe Pain (Refill #3 of #3.) for up to 30 days., Disp: 120 Tablet, Rfl: 0    amoxicillin-clavulanate (AUGMENTIN) 875-125 MG Tab, Take 1 Tablet by mouth 2 times a day., Disp: 14 Tablet, Rfl: 0    azelastine (ASTELIN) 137 MCG/SPRAY nasal spray, Administer 1 Spray into affected nostril(S) 2 times a day. (Patient not taking: Reported on  8/17/2023), Disp: 30 mL, Rfl: 3    fluticasone (FLONASE) 50 MCG/ACT nasal spray, Administer 1 Spray into affected nostril(S) every day. (Patient not taking: Reported on 8/17/2023), Disp: 16 g, Rfl: 5    ZINC PICOLINATE PO, Take 1 Tablet by mouth 2 times a day. (Patient not taking: Reported on 8/17/2023), Disp: , Rfl:     PROBLEMS:  Patient Active Problem List    Diagnosis Date Noted    Esophageal candidiasis ([facility]) 02/08/2023    Hypomagnesemia 01/16/2023    Sacroiliac joint pain 11/10/2022    Esophageal stricture 10/22/2022    Dysphagia 10/21/2022    Drug-induced lupus erythematosus 09/27/2022    Long-term use of hydroxychloroquine 09/27/2022    Fibromyalgia syndrome 09/27/2022    Positive cardiolipin antibodies (IgA and IgG anti-CL) 07/27/2022    Positive VAHID (1:640 homogenous pattern with negative reflex) 07/27/2022    Inflammatory arthritis 07/27/2022    Mild tetrahydrocannabinol (THC) abuse 06/02/2022    Oropharyngeal dysphagia 06/02/2022    Acute pancreatitis 05/23/2022    Migraine 06/04/2019    FLORES (acute kidney injury) ([facility]) 06/01/2019    Essential hypertension 05/20/2019    SIRS (systemic inflammatory response syndrome) ([facility]) 05/19/2019    History of MRSA infection 12/29/2018    History of infection with vancomycin resistant Enterococcus (VRE) 12/29/2018    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[facility]) 12/28/2018    Anemia 12/15/2018    Hyponatremia 12/12/2018    Leukocytosis 12/12/2018    Chronic respiratory failure with hypoxia ([facility]) 03/20/2018    Anxiety disorder due to multiple medical problems 12/01/2017    DDD (degenerative disc disease), lumbar 04/12/2017    History of DVT (deep vein thrombosis) 11/23/2016    Atrial fibrillation ([facility]) 03/26/2015    Sleep apnea 10/26/2014    Postherpetic neuralgia 06/24/2014    Multiple falls 06/24/2014    COPD (chronic obstructive pulmonary disease) ([facility]) 06/24/2014    Rosacea 06/24/2014    Ileostomy in place ([facility]) 06/26/2013    GERD (gastroesophageal reflux disease) 12/05/2012    Status  post lumbar surgery 07/16/2012    Crohn's disease of small intestine with complication (HCC) 09/23/2009    Central sensitization to pain 09/23/2009    Opioid type dependence, continuous (CMS-HCC) 09/23/2009    Degenerative joint disease of cervical and lumbar spine 09/23/2009       REVIEW OF SYSTEMS:  Gen.:  No Nausea, Vomiting, fever, Chills.  Heart: No chest pain.  Lungs:  No shortness of Breath.  Psychological: Rg unusual Anxiety depression     PHYSICAL EXAM   Constitutional: Alert, cooperative, not in acute distress.  Cardiovascular:  Rate Rhythm is regular without murmurs rubs clicks.     Thorax & Lungs: Clear to auscultation, no wheezing, rhonchi, or rales  HENT: Normocephalic, Atraumatic.  Eyes: PERRLA, EOMI, Conjunctiva normal.   Neck: Trachia is midline no swelling of the thyroid.   Lymphatic: No lymphadenopathy noted.   Neurologic: Alert & oriented x 3, cranial nerves II through XII are intact, Normal motor function, Normal sensory function, No focal deficits noted.   Psychiatric: Affect normal, Judgment normal, Mood normal.     VITAL SIGNS:/66   Pulse 91   Temp 36.9 °C (98.4 °F) (Temporal)   Resp 12   Ht 1.829 m (6')   Wt 66.2 kg (146 lb)   SpO2 94%   BMI 19.80 kg/m²     Labs: Reviewed    Assessment:                                                     Plan:    1. Bilious vomiting with nausea  Scription for Compazine written for  - prochlorperazine (COMPAZINE) 10 MG Tab; Take 1 Tablet by mouth every 6 hours as needed for Nausea/Vomiting.  Dispense: 30 Tablet; Refill: 3    2. Need for vaccination  Influenza vaccination given  - Influenza Vaccine, High Dose (65+ Only)

## 2023-10-07 ENCOUNTER — HOSPITAL ENCOUNTER (EMERGENCY)
Facility: MEDICAL CENTER | Age: 68
End: 2023-10-08
Attending: STUDENT IN AN ORGANIZED HEALTH CARE EDUCATION/TRAINING PROGRAM
Payer: MEDICARE

## 2023-10-07 ENCOUNTER — APPOINTMENT (OUTPATIENT)
Dept: RADIOLOGY | Facility: MEDICAL CENTER | Age: 68
End: 2023-10-07
Attending: STUDENT IN AN ORGANIZED HEALTH CARE EDUCATION/TRAINING PROGRAM
Payer: MEDICARE

## 2023-10-07 ENCOUNTER — PATIENT MESSAGE (OUTPATIENT)
Dept: MEDICAL GROUP | Facility: LAB | Age: 68
End: 2023-10-07
Payer: MEDICARE

## 2023-10-07 DIAGNOSIS — R10.84 DIFFUSE ABDOMINAL PAIN: ICD-10-CM

## 2023-10-07 DIAGNOSIS — B37.81 ESOPHAGEAL CANDIDIASIS (HCC): ICD-10-CM

## 2023-10-07 DIAGNOSIS — E86.0 DEHYDRATION: ICD-10-CM

## 2023-10-07 DIAGNOSIS — R11.2 NAUSEA AND VOMITING, UNSPECIFIED VOMITING TYPE: ICD-10-CM

## 2023-10-07 DIAGNOSIS — N17.9 AKI (ACUTE KIDNEY INJURY) (HCC): ICD-10-CM

## 2023-10-07 LAB
ALBUMIN SERPL BCP-MCNC: 4.8 G/DL (ref 3.2–4.9)
ALBUMIN/GLOB SERPL: 1.5 G/DL
ALP SERPL-CCNC: 85 U/L (ref 30–99)
ALT SERPL-CCNC: 19 U/L (ref 2–50)
ANION GAP SERPL CALC-SCNC: 18 MMOL/L (ref 7–16)
AST SERPL-CCNC: 29 U/L (ref 12–45)
BASOPHILS # BLD AUTO: 0.6 % (ref 0–1.8)
BASOPHILS # BLD: 0.05 K/UL (ref 0–0.12)
BILIRUB SERPL-MCNC: 0.6 MG/DL (ref 0.1–1.5)
BUN SERPL-MCNC: 34 MG/DL (ref 8–22)
CALCIUM ALBUM COR SERPL-MCNC: 10.1 MG/DL (ref 8.5–10.5)
CALCIUM SERPL-MCNC: 10.7 MG/DL (ref 8.4–10.2)
CHLORIDE SERPL-SCNC: 103 MMOL/L (ref 96–112)
CO2 SERPL-SCNC: 20 MMOL/L (ref 20–33)
CREAT SERPL-MCNC: 1.49 MG/DL (ref 0.5–1.4)
EOSINOPHIL # BLD AUTO: 0.02 K/UL (ref 0–0.51)
EOSINOPHIL NFR BLD: 0.2 % (ref 0–6.9)
ERYTHROCYTE [DISTWIDTH] IN BLOOD BY AUTOMATED COUNT: 40.8 FL (ref 35.9–50)
GFR SERPLBLD CREATININE-BSD FMLA CKD-EPI: 38 ML/MIN/1.73 M 2
GLOBULIN SER CALC-MCNC: 3.2 G/DL (ref 1.9–3.5)
GLUCOSE SERPL-MCNC: 95 MG/DL (ref 65–99)
HCT VFR BLD AUTO: 40.8 % (ref 37–47)
HGB BLD-MCNC: 13.6 G/DL (ref 12–16)
IMM GRANULOCYTES # BLD AUTO: 0.01 K/UL (ref 0–0.11)
IMM GRANULOCYTES NFR BLD AUTO: 0.1 % (ref 0–0.9)
LIPASE SERPL-CCNC: 22 U/L (ref 11–82)
LYMPHOCYTES # BLD AUTO: 1.23 K/UL (ref 1–4.8)
LYMPHOCYTES NFR BLD: 15 % (ref 22–41)
MCH RBC QN AUTO: 30.8 PG (ref 27–33)
MCHC RBC AUTO-ENTMCNC: 33.3 G/DL (ref 32.2–35.5)
MCV RBC AUTO: 92.3 FL (ref 81.4–97.8)
MONOCYTES # BLD AUTO: 0.97 K/UL (ref 0–0.85)
MONOCYTES NFR BLD AUTO: 11.8 % (ref 0–13.4)
NEUTROPHILS # BLD AUTO: 5.93 K/UL (ref 1.82–7.42)
NEUTROPHILS NFR BLD: 72.3 % (ref 44–72)
NRBC # BLD AUTO: 0 K/UL
NRBC BLD-RTO: 0 /100 WBC (ref 0–0.2)
PLATELET # BLD AUTO: 224 K/UL (ref 164–446)
PMV BLD AUTO: 10.3 FL (ref 9–12.9)
POTASSIUM SERPL-SCNC: 3.9 MMOL/L (ref 3.6–5.5)
PROT SERPL-MCNC: 8 G/DL (ref 6–8.2)
RBC # BLD AUTO: 4.42 M/UL (ref 4.2–5.4)
SODIUM SERPL-SCNC: 141 MMOL/L (ref 135–145)
WBC # BLD AUTO: 8.2 K/UL (ref 4.8–10.8)

## 2023-10-07 PROCEDURE — 700102 HCHG RX REV CODE 250 W/ 637 OVERRIDE(OP): Performed by: STUDENT IN AN ORGANIZED HEALTH CARE EDUCATION/TRAINING PROGRAM

## 2023-10-07 PROCEDURE — 80053 COMPREHEN METABOLIC PANEL: CPT

## 2023-10-07 PROCEDURE — 74177 CT ABD & PELVIS W/CONTRAST: CPT

## 2023-10-07 PROCEDURE — 99285 EMERGENCY DEPT VISIT HI MDM: CPT

## 2023-10-07 PROCEDURE — 83690 ASSAY OF LIPASE: CPT

## 2023-10-07 PROCEDURE — 85025 COMPLETE CBC W/AUTO DIFF WBC: CPT

## 2023-10-07 PROCEDURE — 96374 THER/PROPH/DIAG INJ IV PUSH: CPT | Mod: XU

## 2023-10-07 PROCEDURE — 36415 COLL VENOUS BLD VENIPUNCTURE: CPT

## 2023-10-07 PROCEDURE — 96375 TX/PRO/DX INJ NEW DRUG ADDON: CPT

## 2023-10-07 PROCEDURE — 700117 HCHG RX CONTRAST REV CODE 255: Performed by: STUDENT IN AN ORGANIZED HEALTH CARE EDUCATION/TRAINING PROGRAM

## 2023-10-07 PROCEDURE — 700105 HCHG RX REV CODE 258: Performed by: STUDENT IN AN ORGANIZED HEALTH CARE EDUCATION/TRAINING PROGRAM

## 2023-10-07 PROCEDURE — 700111 HCHG RX REV CODE 636 W/ 250 OVERRIDE (IP): Mod: JZ | Performed by: STUDENT IN AN ORGANIZED HEALTH CARE EDUCATION/TRAINING PROGRAM

## 2023-10-07 PROCEDURE — A9270 NON-COVERED ITEM OR SERVICE: HCPCS | Performed by: STUDENT IN AN ORGANIZED HEALTH CARE EDUCATION/TRAINING PROGRAM

## 2023-10-07 RX ORDER — DIAZEPAM 2 MG/1
2 TABLET ORAL ONCE
Status: DISCONTINUED | OUTPATIENT
Start: 2023-10-08 | End: 2023-10-08

## 2023-10-07 RX ORDER — ONDANSETRON 2 MG/ML
4 INJECTION INTRAMUSCULAR; INTRAVENOUS ONCE
Status: COMPLETED | OUTPATIENT
Start: 2023-10-08 | End: 2023-10-07

## 2023-10-07 RX ORDER — SODIUM CHLORIDE 9 MG/ML
1000 INJECTION, SOLUTION INTRAVENOUS ONCE
Status: COMPLETED | OUTPATIENT
Start: 2023-10-07 | End: 2023-10-08

## 2023-10-07 RX ORDER — HYDROMORPHONE HYDROCHLORIDE 1 MG/ML
1 INJECTION, SOLUTION INTRAMUSCULAR; INTRAVENOUS; SUBCUTANEOUS ONCE
Status: COMPLETED | OUTPATIENT
Start: 2023-10-08 | End: 2023-10-07

## 2023-10-07 RX ORDER — SODIUM CHLORIDE 9 MG/ML
1000 INJECTION, SOLUTION INTRAVENOUS ONCE
Status: COMPLETED | OUTPATIENT
Start: 2023-10-07 | End: 2023-10-07

## 2023-10-07 RX ORDER — HYDROMORPHONE HYDROCHLORIDE 1 MG/ML
1 INJECTION, SOLUTION INTRAMUSCULAR; INTRAVENOUS; SUBCUTANEOUS ONCE
Status: COMPLETED | OUTPATIENT
Start: 2023-10-07 | End: 2023-10-07

## 2023-10-07 RX ORDER — ONDANSETRON 2 MG/ML
4 INJECTION INTRAMUSCULAR; INTRAVENOUS ONCE
Status: COMPLETED | OUTPATIENT
Start: 2023-10-07 | End: 2023-10-07

## 2023-10-07 RX ADMIN — HYDROMORPHONE HYDROCHLORIDE 1 MG: 1 INJECTION, SOLUTION INTRAMUSCULAR; INTRAVENOUS; SUBCUTANEOUS at 22:30

## 2023-10-07 RX ADMIN — HYDROMORPHONE HYDROCHLORIDE 1 MG: 1 INJECTION, SOLUTION INTRAMUSCULAR; INTRAVENOUS; SUBCUTANEOUS at 23:59

## 2023-10-07 RX ADMIN — ONDANSETRON 4 MG: 2 INJECTION INTRAMUSCULAR; INTRAVENOUS at 23:58

## 2023-10-07 RX ADMIN — SODIUM CHLORIDE 1000 ML: 9 INJECTION, SOLUTION INTRAVENOUS at 23:32

## 2023-10-07 RX ADMIN — SODIUM CHLORIDE 1000 ML: 9 INJECTION, SOLUTION INTRAVENOUS at 22:30

## 2023-10-07 RX ADMIN — IOHEXOL 100 ML: 350 INJECTION, SOLUTION INTRAVENOUS at 23:16

## 2023-10-07 RX ADMIN — ONDANSETRON 4 MG: 2 INJECTION INTRAMUSCULAR; INTRAVENOUS at 22:30

## 2023-10-07 ASSESSMENT — FIBROSIS 4 INDEX: FIB4 SCORE: 1.63

## 2023-10-08 VITALS
TEMPERATURE: 98 F | RESPIRATION RATE: 18 BRPM | DIASTOLIC BLOOD PRESSURE: 70 MMHG | HEART RATE: 82 BPM | BODY MASS INDEX: 19.77 KG/M2 | WEIGHT: 146 LBS | HEIGHT: 72 IN | OXYGEN SATURATION: 98 % | SYSTOLIC BLOOD PRESSURE: 150 MMHG

## 2023-10-08 PROCEDURE — 700111 HCHG RX REV CODE 636 W/ 250 OVERRIDE (IP): Mod: JZ | Performed by: STUDENT IN AN ORGANIZED HEALTH CARE EDUCATION/TRAINING PROGRAM

## 2023-10-08 RX ORDER — DIAZEPAM 5 MG/ML
2.5 INJECTION, SOLUTION INTRAMUSCULAR; INTRAVENOUS ONCE
Status: COMPLETED | OUTPATIENT
Start: 2023-10-08 | End: 2023-10-08

## 2023-10-08 RX ADMIN — DIAZEPAM 2.5 MG: 5 INJECTION, SOLUTION INTRAMUSCULAR; INTRAVENOUS at 01:14

## 2023-10-08 NOTE — ED PROVIDER NOTES
ED Provider Note    CHIEF COMPLAINT  Chief Complaint   Patient presents with    Abdominal Pain    N/V     Reports gen abd pain and vomiting. Pt. Feels that she is dehydrated. Has ostomy from [facility]. Denies known fevers.       EXTERNAL RECORDS REVIEWED  Other CT obtained 8/23 without any evidence of acute abdominal process.    HPI/ROS  LIMITATION TO HISTORY   None  OUTSIDE HISTORIAN(S):  None    Jana Overton is a 68 y.o. female who presents with abdominal pain, nausea and vomiting.  Patient says that she has had persistent symptoms since she was here on 2 October.  She says that she has been unable to tolerate her oral pain medications at home due to the vomiting.  She endorses multiple episodes of nonbloody nonbilious emesis a day usually after oral intake.  She has an ostomy and is still having normal output from her ostomy.  She describes it as liquid, no blood.  She denies any fevers or chills.  Patient says that she feels very dehydrated and thinks that she needs more fluid than she was given last time. She is concerned because she is loosing weight.   She denies any localization of the pain and says it is diffuse throughout her abdomen.  She has a longstanding history of Crohn's but says she is not on any medications for it as she could not tolerate the side effects. She says that it is typical for her symptoms to get worse this time of year. She has had decreased urination but no dysuria, hematuria or urgency.     Of note patient was seen here on 2 October for abdominal pain, nausea and vomiting.  She had normal lab work.  She was treated with IV fluids, IV Zofran and Dilaudid and reported improvement and was discharged home.    PAST MEDICAL HISTORY   has a past medical history of Anesthesia, Anxiety, Arthritis, ASTHMA, Atrial fib/flut, Backpain, Bronchitis, Chronic pain, Colostomy in place ([facility]), COPD (chronic obstructive pulmonary disease) ([facility]), Crohn's disease of colon (HCC), Dyspnea, History of cardiac  murmur, Ileostomy present (HCC), Infectious disease, Multiple falls, Narcotic dependence (HCC), Obstruction, Pain, Pneumonia, Postherpetic neuralgia, Rosacea, and Sleep apnea.    SURGICAL HISTORY   has a past surgical history that includes lumbar fusion anterior; cervical disk and fusion anterior; foot surgery; hand surgery; other abdominal surgery; gastroscopy with biopsy (11/22/08); ileostomy (11/11/2009); dilation and curettage (9/24/2010); gastroscopy (10/4/2011); colonoscopy (10/4/2011); hardware removal neuro (7/16/2012); mammoplasty reduction (7/17/2013); ileostomy (5/14/2014); breast reduction; abdominal exploration; lumpectomy; colon resection; ileostomy (12/29/2018); sigmoidoscopy flex (12/29/2018); exploratory laparotomy (12/29/2018); gastroscopy (1/6/2019); gastroscopy (N/A, 5/22/2019); ileostomy (1/20/2021); lysis adhesions general (1/20/2021); cysto/uretero/pyeloscopy, dx (Right, 12/8/2021); cysto stent placemnt pre surg (Right, 12/8/2021); cystoscopy,insert ureteral stent (Right, 12/23/2021); cysto/uretero/pyeloscopy, dx (Right, 12/23/2021); upper gi endoscopy,diagnosis (N/A, 10/24/2022); biliary dilatation (N/A, 10/24/2022); upper gi endoscopy,diagnosis (N/A, 1/16/2023); upper gi endoscopy,w/dilat,gastric out (N/A, 1/16/2023); upper gi endoscopy,biopsy (N/A, 1/16/2023); upper gi endoscopy,diagnosis (N/A, 2/8/2023); upper gi endoscopy,w/dilat,gastric out (N/A, 2/8/2023); and upper gi endoscopy,biopsy (N/A, 2/8/2023).    FAMILY HISTORY  Family History   Problem Relation Age of Onset    Lung Disease Mother         copd    Diabetes Father     Heart Disease Father     Diabetes Sister     Cancer Maternal Aunt 40        breast       SOCIAL HISTORY  Social History     Tobacco Use    Smoking status: Never    Smokeless tobacco: Never    Tobacco comments:     second hand smoke parents - smoked for only 1 year many years ago   Vaping Use    Vaping Use: Every day    Substances: THC   Substance and Sexual Activity     Alcohol use: Not Currently     Alcohol/week: 0.6 oz     Types: 1 Shots of liquor per week     Comment: gin and half a lime; tonic water    Drug use: Yes     Types: Marijuana, Inhaled     Comment: medical marijuana through bong/vape    Sexual activity: Not on file     Comment:        CURRENT MEDICATIONS  Home Medications       Reviewed by Kristyn Cabrera R.N. (Registered Nurse) on 10/07/23 at 1933  Med List Status: Not Addressed     Medication Last Dose Status   amoxicillin-clavulanate (AUGMENTIN) 875-125 MG Tab  Active   Ascorbic Acid (VITAMIN C PO)  Active   azelastine (ASTELIN) 137 MCG/SPRAY nasal spray  Active   Biotin w/ Vitamins C & E (HAIR SKIN & NAILS GUMMIES PO)  Active   diazePAM (VALIUM) 5 MG Tab  Active   fluticasone (FLONASE) 50 MCG/ACT nasal spray  Active   furosemide (LASIX) 20 MG Tab  Active   granisetron (KYTRIL) 1 MG Tab  Active   hydroxychloroquine (PLAQUENIL) 200 MG Tab  Active   levalbuterol (XOPENEX HFA) 45 MCG/ACT inhaler  Active   methylPREDNISolone (MEDROL DOSEPAK) 4 MG Tablet Therapy Pack  Active   metoprolol tartrate (LOPRESSOR) 50 MG Tab  Active   Multiple Vitamins-Minerals (CENTRUM SILVER 50+WOMEN) Tab  Active   naratriptan (AMERGE) 2.5 MG tablet  Active   nystatin (MYCOSTATIN) 664417 UNIT/ML Suspension  Active   ondansetron (ZOFRAN ODT) 4 MG TABLET DISPERSIBLE  Active   ondansetron (ZOFRAN) 4 MG Tab tablet  Active   oxyCODONE immediate release (ROXICODONE) 10 MG immediate release tablet  Active   oxyCODONE immediate release (ROXICODONE) 10 MG immediate release tablet  Active   potassium chloride SA (KDUR) 20 MEQ Tab CR  Active   prochlorperazine (COMPAZINE) 10 MG Tab  Active   spironolactone (ALDACTONE) 25 MG Tab  Active   traZODone (DESYREL) 50 MG Tab  Active   ZINC PICOLINATE PO  Active                    ALLERGIES  Allergies   Allergen Reactions    Demerol Hcl [Meperidine] Shortness of Breath and Palpitations    Methotrexate Hives, Shortness of Breath and Rash           "Morphine Shortness of Breath and Palpitations    Promethazine Shortness of Breath and Rash          Remicade [Infliximab] Hives, Shortness of Breath and Rash          Sudafed [Pseudoephedrine] Shortness of Breath, Vomiting and Palpitations    Azathioprine Sodium Hives and Shortness of Breath     10/21/2022 - patient unable to verify allergy/reaction  Historical reaction listed: Hives, Shortness of Breath    Carafate [Sucralfate] Vomiting and Nausea     + Mouth Sores    Other Drug Unspecified     \"STEROIDS\" - REACTION NOT SPECIFIED    Sulfa Drugs Rash          Sulfasalazine Rash          Azathioprine Hives    Amitriptyline Unspecified     Nightmares      Ativan [Lorazepam] Unspecified     Nightmares    Betamethasone Unspecified     10/21/2022 - patient unable to verify allergy/reaction  No historical reaction listed    Fentanyl Unspecified     \"Patches didn't work\" and skin broke down    Lyrica [Pregabalin] Nausea          Methadone Unspecified     \"Didn't work\"    Potassium Unspecified     Notes: In IV only  REACTION NOT SPECIFIED    Tizanidine Unspecified     10/21/2022 - patient unable to verify allergy/reaction  No historical reaction listed    Toradol [Ketorolac Tromethamine] Unspecified     10/21/2022 - patient unable to verify allergy/reaction, states she was told by her doctor not to take       PHYSICAL EXAM  VITAL SIGNS: BP (!) 150/70   Pulse 82   Temp 36.7 °C (98 °F) (Temporal)   Resp 18   Ht 1.829 m (6')   Wt 66.2 kg (146 lb)   SpO2 98%   BMI 19.80 kg/m²    Constitutional: Awake and alert . Non toxic  HENT: Normal inspection  Dry mucous membranes  Eyes: Normal inspection  Neck: Grossly normal range of motion.  Cardiovascular: Normal heart rate, Normal rhythm.  Symmetric peripheral pulses.   Thorax & Lungs: No respiratory distress, No wheezing, No rales, No rhonchi, No chest tenderness.   Abdomen: Old appearing surgical scars, non-distended, ostomy in place with loose stool output, soft, diffuse " tenderness to palpation no mass  Skin: No obvious rash.  Extremities: Warm, well perfused. No clubbing, cyanosis, edema   Neurologic: Grossly normal   Psychiatric: Normal for situation      DIAGNOSTIC STUDIES / PROCEDURES      LABS  Results for orders placed or performed during the hospital encounter of 10/07/23   CBC WITH DIFFERENTIAL   Result Value Ref Range    WBC 8.2 4.8 - 10.8 K/uL    RBC 4.42 4.20 - 5.40 M/uL    Hemoglobin 13.6 12.0 - 16.0 g/dL    Hematocrit 40.8 37.0 - 47.0 %    MCV 92.3 81.4 - 97.8 fL    MCH 30.8 27.0 - 33.0 pg    MCHC 33.3 32.2 - 35.5 g/dL    RDW 40.8 35.9 - 50.0 fL    Platelet Count 224 164 - 446 K/uL    MPV 10.3 9.0 - 12.9 fL    Neutrophils-Polys 72.30 (H) 44.00 - 72.00 %    Lymphocytes 15.00 (L) 22.00 - 41.00 %    Monocytes 11.80 0.00 - 13.40 %    Eosinophils 0.20 0.00 - 6.90 %    Basophils 0.60 0.00 - 1.80 %    Immature Granulocytes 0.10 0.00 - 0.90 %    Nucleated RBC 0.00 0.00 - 0.20 /100 WBC    Neutrophils (Absolute) 5.93 1.82 - 7.42 K/uL    Lymphs (Absolute) 1.23 1.00 - 4.80 K/uL    Monos (Absolute) 0.97 (H) 0.00 - 0.85 K/uL    Eos (Absolute) 0.02 0.00 - 0.51 K/uL    Baso (Absolute) 0.05 0.00 - 0.12 K/uL    Immature Granulocytes (abs) 0.01 0.00 - 0.11 K/uL    NRBC (Absolute) 0.00 K/uL   CMP   Result Value Ref Range    Sodium 141 135 - 145 mmol/L    Potassium 3.9 3.6 - 5.5 mmol/L    Chloride 103 96 - 112 mmol/L    Co2 20 20 - 33 mmol/L    Anion Gap 18.0 (H) 7.0 - 16.0    Glucose 95 65 - 99 mg/dL    Bun 34 (H) 8 - 22 mg/dL    Creatinine 1.49 (H) 0.50 - 1.40 mg/dL    Calcium 10.7 (H) 8.4 - 10.2 mg/dL    Correct Calcium 10.1 8.5 - 10.5 mg/dL    AST(SGOT) 29 12 - 45 U/L    ALT(SGPT) 19 2 - 50 U/L    Alkaline Phosphatase 85 30 - 99 U/L    Total Bilirubin 0.6 0.1 - 1.5 mg/dL    Albumin 4.8 3.2 - 4.9 g/dL    Total Protein 8.0 6.0 - 8.2 g/dL    Globulin 3.2 1.9 - 3.5 g/dL    A-G Ratio 1.5 g/dL   LIPASE   Result Value Ref Range    Lipase 22 11 - 82 U/L   ESTIMATED GFR   Result Value Ref Range     GFR (CKD-EPI) 38 (A) >60 mL/min/1.73 m 2     *Note: Due to a large number of results and/or encounters for the requested time period, some results have not been displayed. A complete set of results can be found in Results Review.         RADIOLOGY  I have independently interpreted the diagnostic imaging associated with this visit and am waiting the final reading from the radiologist.   My preliminary interpretation is as follows: No obvious obstruction  Radiologist interpretation:   CT-ABDOMEN-PELVIS WITH   Final Result      1.  No evidence of bowel obstruction or acute abnormalities in the abdomen or pelvis.      2.  Post colectomy and left ileostomy again identified.            COURSE & MEDICAL DECISION MAKING    ED Observation Status? Yes; I am placing the patient in to an observation status due to a diagnostic uncertainty as well as therapeutic intensity. Patient placed in observation status at 9:03 PM, 10/7/2023.     Observation plan is as follows: IV, Labs, CT, Fluids, Serial Exams     Upon Reevaluation, the patient's condition has: Improved; and will be discharged.    Patient discharged from ED Observation status at 12:00 AM 10/8/2023    INITIAL ASSESSMENT, COURSE AND PLAN  Care Narrative: This is a 68-year-old female with history of Crohn's who presents with diffuse abdominal pain, nausea and vomiting.  On arrival she is afebrile with normal vital signs and is overall systemically well-appearing.  She has diffuse tenderness on exam without signs of peritonitis.  Patient's labs notable for no leukocytosis.  Her lipase is normal and she does not have pancreatitis.  She does have an FLORES which I suspect is in the setting of dehydration. She is unable to provide a urinalysis but denies any dysuria, hematuria or urgency and UTI or other kidney pathology such as obstructing stone seems very unlikely.  Patient without any chest pain or upper abdominal pain and I doubt ACS.  No pain out of proportion and I doubt  bowel ischemia.  CT abdomen and pelvis ordered to evaluate for bowel obstruction, colitis, diverticulitis and other focal inflammatory and surgical processes.  The this was negative for any acute findings specifically no signs of bowel obstruction.     In the ER patient was given IV Dilaudid, Zofran and 2L fluids.      On repeat evaluation she does report feeling improved. Repeat exam benign.  She has had no further vomiting in the ER. Patient is adamant that she does not want to be admitted to the hospital.  She says that she has been dealing with her Crohn's for years and feels much more comfortable discharging home and following up with her primary doctor.  She is very reliable and given that I am not finding any concerning findings on CT I do not see an indication to make her sign out AMA.  She assures me that she will return if worsening or is unable to tolerate PO. I did explain that she has an FLORES and will definitely need to have her creatinine rechecked this week and she expresses understanding of this and will call her PCP to arrange follow-up. .       HYDRATION: Based on the patient's presentation of Inability to take oral fluids the patient was given IV fluids. IV Hydration was used because oral hydration was not adequate alone. Upon recheck following hydration, the patient was improved.        DISPOSITION AND DISCUSSIONS  I have discussed management of the patient with the following physicians and LOTTIE's:  Dr. Godinez    Discussion of management with other [facility] or appropriate source(s): None       FINAL DIAGNOSIS  1. FLORES (acute kidney injury) (HCC) Acute   2. Dehydration Acute   3. Nausea and vomiting, unspecified vomiting type Acute   4. Diffuse abdominal pain Acute   5. Esophageal candidiasis (HCC)           Electronically signed by: Kori Marie M.D., 10/7/2023 8:45 PM

## 2023-10-08 NOTE — ED NOTES
Discharged in stable condition, alert and oriented, ambulatory, accompanied by  who will drive her home. Prescribed medication and follow up appointment instructed. Health education imparted. Instructed to come back once symptoms worsened. Pt verbalized understanding of the information given. Removed IV line and applied pressure.

## 2023-10-08 NOTE — DISCHARGE INSTRUCTIONS
It is very important that you have your kidney function re-checked this week to ensure it is improving.     Please return to the ER immediately if you have persistent worsening pain, fever, vomiting, inability to eat or drink, blood or black/tarry stools or any new or acute concern.     Please call your PCP to schedule follow-up within one week to ensure your symptoms are improving.

## 2023-10-09 DIAGNOSIS — B37.81 ESOPHAGEAL CANDIDIASIS (HCC): ICD-10-CM

## 2023-10-09 NOTE — TELEPHONE ENCOUNTER
Prescription needs to be re-submitted. Pharmacy never received original prescription.     Patient would also like you to take a look at her blood work.

## 2023-10-12 ENCOUNTER — HOSPITAL ENCOUNTER (OUTPATIENT)
Facility: MEDICAL CENTER | Age: 68
End: 2023-10-12
Attending: PHYSICIAN ASSISTANT
Payer: MEDICARE

## 2023-10-12 ENCOUNTER — TELEPHONE (OUTPATIENT)
Dept: MEDICAL GROUP | Facility: LAB | Age: 68
End: 2023-10-12
Payer: MEDICARE

## 2023-10-12 ENCOUNTER — HOSPITAL ENCOUNTER (INPATIENT)
Facility: MEDICAL CENTER | Age: 68
LOS: 4 days | DRG: 699 | End: 2023-10-28
Attending: STUDENT IN AN ORGANIZED HEALTH CARE EDUCATION/TRAINING PROGRAM | Admitting: STUDENT IN AN ORGANIZED HEALTH CARE EDUCATION/TRAINING PROGRAM
Payer: MEDICARE

## 2023-10-12 ENCOUNTER — APPOINTMENT (OUTPATIENT)
Dept: RADIOLOGY | Facility: MEDICAL CENTER | Age: 68
DRG: 699 | End: 2023-10-12
Attending: STUDENT IN AN ORGANIZED HEALTH CARE EDUCATION/TRAINING PROGRAM
Payer: MEDICARE

## 2023-10-12 DIAGNOSIS — R10.84 GENERALIZED ABDOMINAL PAIN: ICD-10-CM

## 2023-10-12 DIAGNOSIS — R65.20 SEPSIS WITH ACUTE ORGAN DYSFUNCTION WITHOUT SEPTIC SHOCK, DUE TO UNSPECIFIED ORGANISM, UNSPECIFIED ORGAN DYSFUNCTION TYPE (HCC): ICD-10-CM

## 2023-10-12 DIAGNOSIS — F30.10 MANIC BEHAVIOR (HCC): ICD-10-CM

## 2023-10-12 DIAGNOSIS — K22.0 ACHALASIA: ICD-10-CM

## 2023-10-12 DIAGNOSIS — E87.20 LACTIC ACID ACIDOSIS: ICD-10-CM

## 2023-10-12 DIAGNOSIS — N17.9 AKI (ACUTE KIDNEY INJURY) (HCC): ICD-10-CM

## 2023-10-12 DIAGNOSIS — A41.9 SEPSIS WITH ACUTE ORGAN DYSFUNCTION WITHOUT SEPTIC SHOCK, DUE TO UNSPECIFIED ORGANISM, UNSPECIFIED ORGAN DYSFUNCTION TYPE (HCC): ICD-10-CM

## 2023-10-12 DIAGNOSIS — E86.0 DEHYDRATION: ICD-10-CM

## 2023-10-12 LAB
ALBUMIN SERPL BCP-MCNC: 4.7 G/DL (ref 3.2–4.9)
ALBUMIN/GLOB SERPL: 1.5 G/DL
ALP SERPL-CCNC: 85 U/L (ref 30–99)
ALT SERPL-CCNC: 18 U/L (ref 2–50)
ANION GAP SERPL CALC-SCNC: 16 MMOL/L (ref 7–16)
AST SERPL-CCNC: 29 U/L (ref 12–45)
BASOPHILS # BLD AUTO: 0.4 % (ref 0–1.8)
BASOPHILS # BLD: 0.04 K/UL (ref 0–0.12)
BILIRUB SERPL-MCNC: 0.5 MG/DL (ref 0.1–1.5)
BUN SERPL-MCNC: 23 MG/DL (ref 8–22)
CALCIUM ALBUM COR SERPL-MCNC: 9.9 MG/DL (ref 8.5–10.5)
CALCIUM SERPL-MCNC: 10.5 MG/DL (ref 8.5–10.5)
CHLORIDE SERPL-SCNC: 104 MMOL/L (ref 96–112)
CO2 SERPL-SCNC: 22 MMOL/L (ref 20–33)
CREAT SERPL-MCNC: 1.52 MG/DL (ref 0.5–1.4)
EOSINOPHIL # BLD AUTO: 0.05 K/UL (ref 0–0.51)
EOSINOPHIL NFR BLD: 0.5 % (ref 0–6.9)
ERYTHROCYTE [DISTWIDTH] IN BLOOD BY AUTOMATED COUNT: 40.9 FL (ref 35.9–50)
GFR SERPLBLD CREATININE-BSD FMLA CKD-EPI: 37 ML/MIN/1.73 M 2
GLOBULIN SER CALC-MCNC: 3.1 G/DL (ref 1.9–3.5)
GLUCOSE SERPL-MCNC: 97 MG/DL (ref 65–99)
HCT VFR BLD AUTO: 43.8 % (ref 37–47)
HGB BLD-MCNC: 14.7 G/DL (ref 12–16)
IMM GRANULOCYTES # BLD AUTO: 0.05 K/UL (ref 0–0.11)
IMM GRANULOCYTES NFR BLD AUTO: 0.5 % (ref 0–0.9)
LACTATE SERPL-SCNC: 3.9 MMOL/L (ref 0.5–2)
LIPASE SERPL-CCNC: 17 U/L (ref 11–82)
LYMPHOCYTES # BLD AUTO: 1.22 K/UL (ref 1–4.8)
LYMPHOCYTES NFR BLD: 11.9 % (ref 22–41)
MCH RBC QN AUTO: 30.9 PG (ref 27–33)
MCHC RBC AUTO-ENTMCNC: 33.6 G/DL (ref 32.2–35.5)
MCV RBC AUTO: 92 FL (ref 81.4–97.8)
MONOCYTES # BLD AUTO: 1 K/UL (ref 0–0.85)
MONOCYTES NFR BLD AUTO: 9.7 % (ref 0–13.4)
NEUTROPHILS # BLD AUTO: 7.92 K/UL (ref 1.82–7.42)
NEUTROPHILS NFR BLD: 77 % (ref 44–72)
NRBC # BLD AUTO: 0 K/UL
NRBC BLD-RTO: 0 /100 WBC (ref 0–0.2)
PLATELET # BLD AUTO: 210 K/UL (ref 164–446)
PMV BLD AUTO: 10.6 FL (ref 9–12.9)
POTASSIUM SERPL-SCNC: 3.9 MMOL/L (ref 3.6–5.5)
PROT SERPL-MCNC: 7.8 G/DL (ref 6–8.2)
RBC # BLD AUTO: 4.76 M/UL (ref 4.2–5.4)
SODIUM SERPL-SCNC: 142 MMOL/L (ref 135–145)
WBC # BLD AUTO: 10.3 K/UL (ref 4.8–10.8)

## 2023-10-12 PROCEDURE — 87040 BLOOD CULTURE FOR BACTERIA: CPT

## 2023-10-12 PROCEDURE — 99285 EMERGENCY DEPT VISIT HI MDM: CPT

## 2023-10-12 PROCEDURE — 85025 COMPLETE CBC W/AUTO DIFF WBC: CPT

## 2023-10-12 PROCEDURE — 83605 ASSAY OF LACTIC ACID: CPT

## 2023-10-12 PROCEDURE — 87086 URINE CULTURE/COLONY COUNT: CPT

## 2023-10-12 PROCEDURE — 83690 ASSAY OF LIPASE: CPT

## 2023-10-12 PROCEDURE — 700105 HCHG RX REV CODE 258: Performed by: STUDENT IN AN ORGANIZED HEALTH CARE EDUCATION/TRAINING PROGRAM

## 2023-10-12 PROCEDURE — 36415 COLL VENOUS BLD VENIPUNCTURE: CPT

## 2023-10-12 PROCEDURE — 96374 THER/PROPH/DIAG INJ IV PUSH: CPT

## 2023-10-12 PROCEDURE — 85652 RBC SED RATE AUTOMATED: CPT

## 2023-10-12 PROCEDURE — 96375 TX/PRO/DX INJ NEW DRUG ADDON: CPT

## 2023-10-12 PROCEDURE — 80053 COMPREHEN METABOLIC PANEL: CPT

## 2023-10-12 PROCEDURE — 86140 C-REACTIVE PROTEIN: CPT

## 2023-10-12 PROCEDURE — 700111 HCHG RX REV CODE 636 W/ 250 OVERRIDE (IP): Performed by: STUDENT IN AN ORGANIZED HEALTH CARE EDUCATION/TRAINING PROGRAM

## 2023-10-12 RX ORDER — SODIUM CHLORIDE 9 MG/ML
1000 INJECTION, SOLUTION INTRAVENOUS ONCE
Status: COMPLETED | OUTPATIENT
Start: 2023-10-13 | End: 2023-10-13

## 2023-10-12 RX ORDER — HYDROMORPHONE HYDROCHLORIDE 1 MG/ML
1 INJECTION, SOLUTION INTRAMUSCULAR; INTRAVENOUS; SUBCUTANEOUS ONCE
Status: COMPLETED | OUTPATIENT
Start: 2023-10-12 | End: 2023-10-12

## 2023-10-12 RX ORDER — ONDANSETRON 2 MG/ML
4 INJECTION INTRAMUSCULAR; INTRAVENOUS ONCE
Status: COMPLETED | OUTPATIENT
Start: 2023-10-12 | End: 2023-10-12

## 2023-10-12 RX ORDER — CEFTRIAXONE 2 G/1
2000 INJECTION, POWDER, FOR SOLUTION INTRAMUSCULAR; INTRAVENOUS ONCE
Status: COMPLETED | OUTPATIENT
Start: 2023-10-13 | End: 2023-10-13

## 2023-10-12 RX ORDER — SODIUM CHLORIDE 9 MG/ML
1000 INJECTION, SOLUTION INTRAVENOUS ONCE
Status: COMPLETED | OUTPATIENT
Start: 2023-10-12 | End: 2023-10-13

## 2023-10-12 RX ADMIN — HYDROMORPHONE HYDROCHLORIDE 1 MG: 1 INJECTION, SOLUTION INTRAMUSCULAR; INTRAVENOUS; SUBCUTANEOUS at 23:04

## 2023-10-12 RX ADMIN — SODIUM CHLORIDE 1000 ML: 9 INJECTION, SOLUTION INTRAVENOUS at 23:02

## 2023-10-12 RX ADMIN — ONDANSETRON 4 MG: 2 INJECTION INTRAMUSCULAR; INTRAVENOUS at 23:03

## 2023-10-12 ASSESSMENT — FIBROSIS 4 INDEX: FIB4 SCORE: 2.08

## 2023-10-12 ASSESSMENT — PAIN DESCRIPTION - PAIN TYPE: TYPE: ACUTE PAIN

## 2023-10-13 ENCOUNTER — APPOINTMENT (OUTPATIENT)
Dept: RADIOLOGY | Facility: MEDICAL CENTER | Age: 68
DRG: 699 | End: 2023-10-13
Attending: STUDENT IN AN ORGANIZED HEALTH CARE EDUCATION/TRAINING PROGRAM
Payer: MEDICARE

## 2023-10-13 PROBLEM — E87.20 LACTIC ACID ACIDOSIS: Status: ACTIVE | Noted: 2023-10-13

## 2023-10-13 LAB
APPEARANCE UR: ABNORMAL
BACTERIA #/AREA URNS HPF: NEGATIVE /HPF
BILIRUB UR QL STRIP.AUTO: NEGATIVE
COLOR UR: YELLOW
CRP SERPL HS-MCNC: <0.3 MG/DL (ref 0–0.75)
EKG IMPRESSION: NORMAL
EPI CELLS #/AREA URNS HPF: NEGATIVE /HPF
ERYTHROCYTE [SEDIMENTATION RATE] IN BLOOD BY WESTERGREN METHOD: 15 MM/HOUR (ref 0–25)
GLUCOSE UR STRIP.AUTO-MCNC: NEGATIVE MG/DL
HYALINE CASTS #/AREA URNS LPF: ABNORMAL /LPF
KETONES UR STRIP.AUTO-MCNC: ABNORMAL MG/DL
LACTATE SERPL-SCNC: 1.2 MMOL/L (ref 0.5–2)
LEUKOCYTE ESTERASE UR QL STRIP.AUTO: NEGATIVE
MICRO URNS: ABNORMAL
NITRITE UR QL STRIP.AUTO: NEGATIVE
PH UR STRIP.AUTO: 5 [PH] (ref 5–8)
PROT UR QL STRIP: 300 MG/DL
RBC # URNS HPF: >150 /HPF
RBC UR QL AUTO: ABNORMAL
SP GR UR STRIP.AUTO: 1.02
UROBILINOGEN UR STRIP.AUTO-MCNC: 0.2 MG/DL
WBC #/AREA URNS HPF: ABNORMAL /HPF

## 2023-10-13 PROCEDURE — 93010 ELECTROCARDIOGRAM REPORT: CPT | Performed by: INTERNAL MEDICINE

## 2023-10-13 PROCEDURE — 99223 1ST HOSP IP/OBS HIGH 75: CPT | Mod: AI | Performed by: STUDENT IN AN ORGANIZED HEALTH CARE EDUCATION/TRAINING PROGRAM

## 2023-10-13 PROCEDURE — 81001 URINALYSIS AUTO W/SCOPE: CPT

## 2023-10-13 PROCEDURE — 700111 HCHG RX REV CODE 636 W/ 250 OVERRIDE (IP): Mod: JZ | Performed by: STUDENT IN AN ORGANIZED HEALTH CARE EDUCATION/TRAINING PROGRAM

## 2023-10-13 PROCEDURE — 700105 HCHG RX REV CODE 258: Performed by: STUDENT IN AN ORGANIZED HEALTH CARE EDUCATION/TRAINING PROGRAM

## 2023-10-13 PROCEDURE — A9270 NON-COVERED ITEM OR SERVICE: HCPCS | Performed by: STUDENT IN AN ORGANIZED HEALTH CARE EDUCATION/TRAINING PROGRAM

## 2023-10-13 PROCEDURE — 83605 ASSAY OF LACTIC ACID: CPT

## 2023-10-13 PROCEDURE — 700102 HCHG RX REV CODE 250 W/ 637 OVERRIDE(OP): Performed by: STUDENT IN AN ORGANIZED HEALTH CARE EDUCATION/TRAINING PROGRAM

## 2023-10-13 PROCEDURE — 700111 HCHG RX REV CODE 636 W/ 250 OVERRIDE (IP): Performed by: STUDENT IN AN ORGANIZED HEALTH CARE EDUCATION/TRAINING PROGRAM

## 2023-10-13 PROCEDURE — 96375 TX/PRO/DX INJ NEW DRUG ADDON: CPT

## 2023-10-13 PROCEDURE — 96376 TX/PRO/DX INJ SAME DRUG ADON: CPT

## 2023-10-13 PROCEDURE — 700117 HCHG RX CONTRAST REV CODE 255: Performed by: STUDENT IN AN ORGANIZED HEALTH CARE EDUCATION/TRAINING PROGRAM

## 2023-10-13 PROCEDURE — G0378 HOSPITAL OBSERVATION PER HR: HCPCS

## 2023-10-13 PROCEDURE — 97602 WOUND(S) CARE NON-SELECTIVE: CPT

## 2023-10-13 PROCEDURE — 93005 ELECTROCARDIOGRAM TRACING: CPT | Performed by: STUDENT IN AN ORGANIZED HEALTH CARE EDUCATION/TRAINING PROGRAM

## 2023-10-13 PROCEDURE — 36415 COLL VENOUS BLD VENIPUNCTURE: CPT

## 2023-10-13 PROCEDURE — 74177 CT ABD & PELVIS W/CONTRAST: CPT

## 2023-10-13 RX ORDER — FLUTICASONE PROPIONATE 50 MCG
1 SPRAY, SUSPENSION (ML) NASAL
COMMUNITY

## 2023-10-13 RX ORDER — OXYCODONE HYDROCHLORIDE 5 MG/1
5 TABLET ORAL
Status: DISCONTINUED | OUTPATIENT
Start: 2023-10-13 | End: 2023-10-16

## 2023-10-13 RX ORDER — OXYCODONE HYDROCHLORIDE 10 MG/1
10 TABLET ORAL
Status: DISCONTINUED | OUTPATIENT
Start: 2023-10-13 | End: 2023-10-16

## 2023-10-13 RX ORDER — OXYCODONE HYDROCHLORIDE 5 MG/1
5 TABLET ORAL
Status: DISCONTINUED | OUTPATIENT
Start: 2023-10-13 | End: 2023-10-13

## 2023-10-13 RX ORDER — ONDANSETRON 2 MG/ML
4 INJECTION INTRAMUSCULAR; INTRAVENOUS ONCE
Status: COMPLETED | OUTPATIENT
Start: 2023-10-13 | End: 2023-10-13

## 2023-10-13 RX ORDER — ONDANSETRON 2 MG/ML
4 INJECTION INTRAMUSCULAR; INTRAVENOUS EVERY 4 HOURS PRN
Status: DISCONTINUED | OUTPATIENT
Start: 2023-10-13 | End: 2023-10-28 | Stop reason: HOSPADM

## 2023-10-13 RX ORDER — ACETAMINOPHEN 325 MG/1
650 TABLET ORAL EVERY 6 HOURS
Status: DISPENSED | OUTPATIENT
Start: 2023-10-13 | End: 2023-10-18

## 2023-10-13 RX ORDER — SODIUM CHLORIDE, SODIUM LACTATE, POTASSIUM CHLORIDE, CALCIUM CHLORIDE 600; 310; 30; 20 MG/100ML; MG/100ML; MG/100ML; MG/100ML
INJECTION, SOLUTION INTRAVENOUS CONTINUOUS
Status: DISCONTINUED | OUTPATIENT
Start: 2023-10-13 | End: 2023-10-14

## 2023-10-13 RX ORDER — AZELASTINE HYDROCHLORIDE 137 UG/1
1 SPRAY, METERED NASAL
COMMUNITY

## 2023-10-13 RX ORDER — SODIUM CHLORIDE, SODIUM LACTATE, POTASSIUM CHLORIDE, CALCIUM CHLORIDE 600; 310; 30; 20 MG/100ML; MG/100ML; MG/100ML; MG/100ML
INJECTION, SOLUTION INTRAVENOUS CONTINUOUS
Status: ACTIVE | OUTPATIENT
Start: 2023-10-13 | End: 2023-10-13

## 2023-10-13 RX ORDER — HYDROMORPHONE HYDROCHLORIDE 1 MG/ML
1 INJECTION, SOLUTION INTRAMUSCULAR; INTRAVENOUS; SUBCUTANEOUS ONCE
Status: COMPLETED | OUTPATIENT
Start: 2023-10-13 | End: 2023-10-13

## 2023-10-13 RX ORDER — HEPARIN SODIUM 5000 [USP'U]/ML
5000 INJECTION, SOLUTION INTRAVENOUS; SUBCUTANEOUS EVERY 8 HOURS
Status: DISCONTINUED | OUTPATIENT
Start: 2023-10-13 | End: 2023-10-28 | Stop reason: HOSPADM

## 2023-10-13 RX ORDER — DIAZEPAM 5 MG/ML
5 INJECTION, SOLUTION INTRAMUSCULAR; INTRAVENOUS EVERY 6 HOURS PRN
Status: DISCONTINUED | OUTPATIENT
Start: 2023-10-13 | End: 2023-10-16

## 2023-10-13 RX ORDER — HYDROMORPHONE HYDROCHLORIDE 1 MG/ML
0.5 INJECTION, SOLUTION INTRAMUSCULAR; INTRAVENOUS; SUBCUTANEOUS EVERY 4 HOURS PRN
Status: DISCONTINUED | OUTPATIENT
Start: 2023-10-13 | End: 2023-10-13

## 2023-10-13 RX ORDER — HYDROXYCHLOROQUINE SULFATE 200 MG/1
300 TABLET, FILM COATED ORAL DAILY
Status: DISCONTINUED | OUTPATIENT
Start: 2023-10-13 | End: 2023-10-24

## 2023-10-13 RX ORDER — METOPROLOL TARTRATE 50 MG/1
50 TABLET, FILM COATED ORAL 2 TIMES DAILY
Status: DISCONTINUED | OUTPATIENT
Start: 2023-10-13 | End: 2023-10-28 | Stop reason: HOSPADM

## 2023-10-13 RX ORDER — POTASSIUM CHLORIDE 20 MEQ/1
20 TABLET, EXTENDED RELEASE ORAL
Status: ON HOLD | COMMUNITY
End: 2023-10-26

## 2023-10-13 RX ORDER — HYDROMORPHONE HYDROCHLORIDE 1 MG/ML
0.5 INJECTION, SOLUTION INTRAMUSCULAR; INTRAVENOUS; SUBCUTANEOUS
Status: DISCONTINUED | OUTPATIENT
Start: 2023-10-13 | End: 2023-10-13

## 2023-10-13 RX ORDER — DIAZEPAM 5 MG/ML
2.5 INJECTION, SOLUTION INTRAMUSCULAR; INTRAVENOUS ONCE
Status: COMPLETED | OUTPATIENT
Start: 2023-10-13 | End: 2023-10-13

## 2023-10-13 RX ORDER — LEVALBUTEROL INHALATION SOLUTION 0.63 MG/3ML
3 SOLUTION RESPIRATORY (INHALATION) EVERY 4 HOURS PRN
Status: DISCONTINUED | OUTPATIENT
Start: 2023-10-13 | End: 2023-10-28 | Stop reason: HOSPADM

## 2023-10-13 RX ORDER — OXYCODONE HYDROCHLORIDE 10 MG/1
10 TABLET ORAL
Status: DISCONTINUED | OUTPATIENT
Start: 2023-10-13 | End: 2023-10-13

## 2023-10-13 RX ORDER — HYDRALAZINE HYDROCHLORIDE 20 MG/ML
10 INJECTION INTRAMUSCULAR; INTRAVENOUS ONCE
Status: COMPLETED | OUTPATIENT
Start: 2023-10-13 | End: 2023-10-13

## 2023-10-13 RX ORDER — HYDROMORPHONE HYDROCHLORIDE 1 MG/ML
1 INJECTION, SOLUTION INTRAMUSCULAR; INTRAVENOUS; SUBCUTANEOUS EVERY 4 HOURS PRN
Status: DISCONTINUED | OUTPATIENT
Start: 2023-10-13 | End: 2023-10-16

## 2023-10-13 RX ORDER — ACETAMINOPHEN 325 MG/1
650 TABLET ORAL EVERY 6 HOURS PRN
Status: DISCONTINUED | OUTPATIENT
Start: 2023-10-18 | End: 2023-10-28 | Stop reason: HOSPADM

## 2023-10-13 RX ADMIN — HEPARIN SODIUM 5000 UNITS: 5000 INJECTION, SOLUTION INTRAVENOUS; SUBCUTANEOUS at 20:58

## 2023-10-13 RX ADMIN — HYDROMORPHONE HYDROCHLORIDE 1 MG: 1 INJECTION, SOLUTION INTRAMUSCULAR; INTRAVENOUS; SUBCUTANEOUS at 01:13

## 2023-10-13 RX ADMIN — HYDROMORPHONE HYDROCHLORIDE 1 MG: 1 INJECTION, SOLUTION INTRAMUSCULAR; INTRAVENOUS; SUBCUTANEOUS at 18:02

## 2023-10-13 RX ADMIN — ACETAMINOPHEN 650 MG: 325 TABLET, FILM COATED ORAL at 06:13

## 2023-10-13 RX ADMIN — HYDROMORPHONE HYDROCHLORIDE 1 MG: 1 INJECTION, SOLUTION INTRAMUSCULAR; INTRAVENOUS; SUBCUTANEOUS at 13:53

## 2023-10-13 RX ADMIN — SODIUM CHLORIDE, POTASSIUM CHLORIDE, SODIUM LACTATE AND CALCIUM CHLORIDE: 600; 310; 30; 20 INJECTION, SOLUTION INTRAVENOUS at 06:04

## 2023-10-13 RX ADMIN — HYDROMORPHONE HYDROCHLORIDE 1 MG: 1 INJECTION, SOLUTION INTRAMUSCULAR; INTRAVENOUS; SUBCUTANEOUS at 22:43

## 2023-10-13 RX ADMIN — SODIUM CHLORIDE, POTASSIUM CHLORIDE, SODIUM LACTATE AND CALCIUM CHLORIDE: 600; 310; 30; 20 INJECTION, SOLUTION INTRAVENOUS at 06:12

## 2023-10-13 RX ADMIN — IOHEXOL 100 ML: 350 INJECTION, SOLUTION INTRAVENOUS at 02:28

## 2023-10-13 RX ADMIN — SODIUM CHLORIDE 1000 ML: 9 INJECTION, SOLUTION INTRAVENOUS at 00:15

## 2023-10-13 RX ADMIN — CEFTRIAXONE SODIUM 2000 MG: 2 INJECTION, POWDER, FOR SOLUTION INTRAMUSCULAR; INTRAVENOUS at 00:15

## 2023-10-13 RX ADMIN — ONDANSETRON 4 MG: 2 INJECTION INTRAMUSCULAR; INTRAVENOUS at 08:54

## 2023-10-13 RX ADMIN — ONDANSETRON 4 MG: 2 INJECTION INTRAMUSCULAR; INTRAVENOUS at 01:13

## 2023-10-13 RX ADMIN — DIAZEPAM 2.5 MG: 5 INJECTION, SOLUTION INTRAMUSCULAR; INTRAVENOUS at 01:48

## 2023-10-13 RX ADMIN — DIAZEPAM 5 MG: 5 INJECTION, SOLUTION INTRAMUSCULAR; INTRAVENOUS at 23:48

## 2023-10-13 RX ADMIN — ONDANSETRON 4 MG: 2 INJECTION INTRAMUSCULAR; INTRAVENOUS at 18:26

## 2023-10-13 RX ADMIN — NYSTATIN 500000 UNITS: 100000 SUSPENSION ORAL at 20:58

## 2023-10-13 RX ADMIN — HYDRALAZINE HYDROCHLORIDE 10 MG: 20 INJECTION, SOLUTION INTRAMUSCULAR; INTRAVENOUS at 02:55

## 2023-10-13 RX ADMIN — DIAZEPAM 5 MG: 5 INJECTION, SOLUTION INTRAMUSCULAR; INTRAVENOUS at 16:08

## 2023-10-13 RX ADMIN — HYDROMORPHONE HYDROCHLORIDE 0.5 MG: 1 INJECTION, SOLUTION INTRAMUSCULAR; INTRAVENOUS; SUBCUTANEOUS at 06:29

## 2023-10-13 RX ADMIN — HYDROMORPHONE HYDROCHLORIDE 0.5 MG: 1 INJECTION, SOLUTION INTRAMUSCULAR; INTRAVENOUS; SUBCUTANEOUS at 11:46

## 2023-10-13 RX ADMIN — SODIUM CHLORIDE, POTASSIUM CHLORIDE, SODIUM LACTATE AND CALCIUM CHLORIDE: 600; 310; 30; 20 INJECTION, SOLUTION INTRAVENOUS at 18:09

## 2023-10-13 RX ADMIN — HEPARIN SODIUM 5000 UNITS: 5000 INJECTION, SOLUTION INTRAVENOUS; SUBCUTANEOUS at 13:54

## 2023-10-13 ASSESSMENT — COGNITIVE AND FUNCTIONAL STATUS - GENERAL
MOBILITY SCORE: 13
TURNING FROM BACK TO SIDE WHILE IN FLAT BAD: A LITTLE
MOVING FROM LYING ON BACK TO SITTING ON SIDE OF FLAT BED: A LOT
SUGGESTED CMS G CODE MODIFIER DAILY ACTIVITY: CJ
MOVING TO AND FROM BED TO CHAIR: A LOT
PERSONAL GROOMING: A LITTLE
STANDING UP FROM CHAIR USING ARMS: A LOT
WALKING IN HOSPITAL ROOM: A LOT
HELP NEEDED FOR BATHING: A LITTLE
SUGGESTED CMS G CODE MODIFIER MOBILITY: CL
DRESSING REGULAR LOWER BODY CLOTHING: A LITTLE
CLIMB 3 TO 5 STEPS WITH RAILING: A LOT
TOILETING: A LITTLE
DAILY ACTIVITIY SCORE: 20

## 2023-10-13 ASSESSMENT — LIFESTYLE VARIABLES
ON A TYPICAL DAY WHEN YOU DRINK ALCOHOL HOW MANY DRINKS DO YOU HAVE: 1
TOTAL SCORE: 0
EVER HAD A DRINK FIRST THING IN THE MORNING TO STEADY YOUR NERVES TO GET RID OF A HANGOVER: NO
TOTAL SCORE: 0
HAVE YOU EVER FELT YOU SHOULD CUT DOWN ON YOUR DRINKING: NO
ALCOHOL_USE: YES
HOW MANY TIMES IN THE PAST YEAR HAVE YOU HAD 5 OR MORE DRINKS IN A DAY: 1
HAVE PEOPLE ANNOYED YOU BY CRITICIZING YOUR DRINKING: NO
TOTAL SCORE: 0
AVERAGE NUMBER OF DAYS PER WEEK YOU HAVE A DRINK CONTAINING ALCOHOL: 3
EVER FELT BAD OR GUILTY ABOUT YOUR DRINKING: NO
DOES PATIENT WANT TO STOP DRINKING: NO
CONSUMPTION TOTAL: POSITIVE

## 2023-10-13 ASSESSMENT — ENCOUNTER SYMPTOMS
HEADACHES: 0
WEIGHT LOSS: 1
DIZZINESS: 0
MYALGIAS: 1
SORE THROAT: 0
PALPITATIONS: 0
COUGH: 0
CHILLS: 0
DIARRHEA: 0
ABDOMINAL PAIN: 1
NECK PAIN: 0
SHORTNESS OF BREATH: 0
VOMITING: 1
NAUSEA: 1
BLOOD IN STOOL: 0
EYE PAIN: 0
FEVER: 0
EYE REDNESS: 0
HEARTBURN: 0

## 2023-10-13 ASSESSMENT — PAIN DESCRIPTION - PAIN TYPE
TYPE: ACUTE PAIN
TYPE: CHRONIC PAIN
TYPE: CHRONIC PAIN

## 2023-10-13 NOTE — PROGRESS NOTES
Patient was admitted earlier this morning. Please refer H&P for details    68 y.o. female who presented 10/12/2023 with urinary retention, nausea.  Patient has a history of Crohn's disease status post colectomy with ileostomy on left side, hypertension, chronic pain.  She has had 2 recent emergency room visits due to worsening of chronic abdominal pain and difficulty tolerating p.o. intake with failure of p.o. Zofran at home.  On her last ED visit she had a borderline FLORES with creatinine increased to 1.5 from baseline of 1.2.  She was discharged home after some IV fluids.  She presents again due to concern for decreased urinary output.  She had a Sage placed at urology office for unclear reason however did not drain much urine.  Kidney function stable from last week however did have elevated lactic acid to 3.9.  After some IV fluids she is making some dark urine.  Due to concern for elevated lactic acid and inability to tolerate p.o. intake admission was requested.  CT abdomen unremarkable  Lactic acid 3.9    I have seen patient multiple times at bedside and discussed goal of care. She requested her GI Dr. Davalos informed. I have discussed with Dr. Davalos. She reports significant abdominal pain. I have reviewed CT abdomen independently, no acute pathology.   She looks dry. Multiple ulcers noted on the tongue.   Ostomy bag is empty.  Patient recently completed medrol dose pamela. ESR/CRP negative    Impressions  - FLORES  - lactic acidosis  - Crohn disease s/p colectomy and has ostomy in place  - abdominal pain and unable to take oral diet    Plans  - IVF  - Antiemetics  - follow up lactic acid level  - Nystatin oral solutions  - Dietician consulted  - She states she is unable to tolerate any oral diet or oral pills. Reports significant abdominal pain and muscle cramp in legs, I have ordered iv dilaudid and iv diazepam prn. Closely monitoring sedation score and respiratory status  - I have reached out GI Dr. Davalos per patient's  request.   - contacted wound care ostomy team   - outpatient PT referral     Patient is has a high medical complexity, complex decision making and is at high risk for complication, morbidity, and mortality.    My total time spent caring for the patient on the day of the encounter was 78 minutes.   This does not include time spent on separately billable procedures/tests.

## 2023-10-13 NOTE — CARE PLAN
The patient is Stable - Low risk of patient condition declining or worsening         Progress made toward(s) clinical / shift goals:  on going    Patient is not progressing towards the following goals:  Problem: Pain - Standard  Goal: Alleviation of pain or a reduction in pain to the patient’s comfort goal  Description: Target End Date:  Prior to discharge or change in level of care    Document on Vitals flowsheet    1.  Document pain using the appropriate pain scale per order or unit policy  2.  Educate and implement non-pharmacologic comfort measures (i.e. relaxation, distraction, massage, cold/heat therapy, etc.)  3.  Pain management medications as ordered  4.  Reassess pain after pain med administration per policy  5.  If opiods administered assess patient's response to pain medication is appropriate per POSS sedation scale  6.  Follow pain management plan developed in collaboration with patient and interdisciplinary team (including palliative care or pain specialists if applicable)  Outcome: Progressing

## 2023-10-13 NOTE — ASSESSMENT & PLAN NOTE
Has ostomy in place.  Follows with Dr. Davalos per patient  Recently completed Medrol Dosepak, will hold off on further steroids as unclear that there is current Crohn's flare given no ostomy output or findings of inflammation on CT abdomen  Check inflammatory markers ESR, CRP negative  No bloody bowel movement or obstruction, chance of acute Crohn small bowel flare is not high.   Ordered calprotectin fecal, patient refusing stool analysis.   Consulted GI, discussed with GI  calprotectin pending.   Discussed with GI today, no intervention planned.     calprotectin low.

## 2023-10-13 NOTE — ED NOTES
Report given to Nithya BARNES. Pt transferred out of ED at this time with Transport team. Pt is A&Ox4, with stable vital signs and no apparent distress upon transfer. Pt Transferred on 2L O2 with adequate O2 volume in bed tank. All paperwork and personal belongings sent with pt. Report given to transporter.

## 2023-10-13 NOTE — ED NOTES
Patient medicated per MAR   Summary  ID: [id number]  Patient: William Costa [[id number]]     Procedure: SERVICE TO GASTROENTEROLOGY Status: Needs Scheduling   Requested appt date:  Authorizing: TOÑO Sneed in [facility]   Referral: [id number]   (Pending Review)       Priority: ASAP               Diagnosis: Rectal lesion [K62.9]         Request History    Action Date and Time User Details   Request Created 11/01/2021 10:10 TOÑO Sneed Appointment Encounter: Details           Referral (Pending Review)  ID: [id number]  Created on: 11/1/2021  Referred by Referred to     Comments    Rectal lesion     Order Specific Questions    Indicate Service Requested   Consult        [id number]
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
## 2023-10-13 NOTE — DIETARY
Nutrition services: Day 0 of admit.  Jana Overton is a 68 y.o. female with admitting DX of Lactic acid acidosis.  Consult received for BMI <19. Diet consult for diet education. Pt requested to speak with RD.    Pt requested to speak with RD as she is concerned that she gets a meal. MD contacted by RN and diet advanced to Cardiac. Pt reports a history of Crohn's disease. She states she has been unable to eat much recently and unable to take her medications. Her  brought in her lunch for her today and she requests additional gravy for her meal. She has very specific foods that she knows she can tolerate and her family brings in food.    Assessment:  Height: 182.9 cm (6')  Weight: 61.7 kg (136 lb), stated weight  Body mass index is 18.44 kg/m²., BMI classification: Underweight  Diet/Intake: Cardiac    Evaluation:   Per notes, pt reports unable to tolerate any oral diet for ~2 weeks.  Current BMI is <19, but weight is stated. Requested measured weight from nursing when feasible. Per chart review, weight previously stable ~67 kg since January with most recent weight on 10/2/23 at 67 kg.   Medications reviewed.  Labs 10/12 include Alb 4.7.    Malnutrition Risk: Unable to meet ASPEN criteria. Requested measured weight.    Recommendations/Plan:  Diet per MD.   Encourage intake of meals >50%.  Document intake of all meals as % taken in ADL's to provide interdisciplinary communication across all shifts.   Monitor weight.  Nutrition rep will continue to see patient for ongoing meal and snack preferences.     RD following

## 2023-10-13 NOTE — DISCHARGE PLANNING
Patient rolled back to observation/outpatient status per attending   MD determination Milka Westfall and UR committee MD secondary review   Ger Forbes. Condition code 44 completed.

## 2023-10-13 NOTE — H&P
Hospital Medicine History & Physical Note    Date of Service  10/13/2023    Primary Care Physician  Chase Charles D.O.    Code Status  Full Code    Chief Complaint  Chief Complaint   Patient presents with    Urinary Retention     Since 3 days associated with generalized body pain and abdominal pain  She has milton catheter in place this morning [facility] u[facility] office, but no urine output since then    Alert and Oriented x 4  Using cane for ambulation       History of Presenting Illness  Jana Overton is a 68 y.o. female who presented 10/12/2023 with urinary retention, nausea.  Patient has a history of Crohn's disease status post colectomy with ileostomy on left side, hypertension, chronic pain.  She has had 2 recent emergency room visits due to worsening of chronic abdominal pain and difficulty tolerating p.o. intake with failure of p.o. Zofran at home.  On her last ED visit she had a borderline FLORES with creatinine increased to 1.5 from baseline of 1.2.  She was discharged home after some IV fluids.  She presents again tonight due to concern for decreased urinary output.  She had a Milton placed at urology office for unclear reason however did not drain much urine.  Kidney function stable from last week however did have elevated lactic acid to 3.9.  After some IV fluids she is making some dark urine.  Due to concern for elevated lactic acid and inability to tolerate p.o. intake admission was requested.    I discussed the plan of care with patient.    Review of Systems  Review of Systems   Constitutional:  Positive for malaise/fatigue and weight loss. Negative for chills and fever.   HENT:  Negative for congestion and sore throat.    Eyes:  Negative for pain and redness.   Respiratory:  Negative for cough and shortness of breath.    Cardiovascular:  Negative for chest pain and palpitations.   Gastrointestinal:  Positive for abdominal pain, nausea and vomiting. Negative for blood in stool, diarrhea and heartburn.    Genitourinary:  Negative for dysuria and urgency.   Musculoskeletal:  Positive for myalgias. Negative for neck pain.   Neurological:  Negative for dizziness and headaches.       Past Medical History   has a past medical history of Anesthesia, Anxiety, Arthritis, ASTHMA, Atrial fib/flut, Backpain, Bronchitis, Chronic pain, Colostomy in place ([facility]), COPD (chronic obstructive pulmonary disease) ([facility]), Crohn's disease of colon ([facility]), Dyspnea, History of cardiac murmur, Ileostomy present ([facility]), Infectious disease, Multiple falls, Narcotic dependence ([facility]), Obstruction, Pain, Pneumonia, Postherpetic neuralgia, Rosacea, and Sleep apnea.    Surgical History   has a past surgical history that includes lumbar fusion anterior; cervical disk and fusion anterior; foot surgery; hand surgery; other abdominal surgery; gastroscopy with biopsy (11/22/08); ileostomy (11/11/2009); dilation and curettage (9/24/2010); gastroscopy (10/4/2011); colonoscopy (10/4/2011); hardware removal neuro (7/16/2012); mammoplasty reduction (7/17/2013); ileostomy (5/14/2014); pr breast reduction; abdominal exploration; lumpectomy; colon resection; ileostomy (12/29/2018); sigmoidoscopy flex (12/29/2018); exploratory laparotomy (12/29/2018); gastroscopy (1/6/2019); gastroscopy (N/A, 5/22/2019); ileostomy (1/20/2021); lysis adhesions general (1/20/2021); pr cysto/uretero/pyeloscopy, dx (Right, 12/8/2021); cysto stent placemnt pre surg (Right, 12/8/2021); pr cystoscopy,insert ureteral stent (Right, 12/23/2021); pr cysto/uretero/pyeloscopy, dx (Right, 12/23/2021); pr upper gi endoscopy,diagnosis (N/A, 10/24/2022); biliary dilatation (N/A, 10/24/2022); pr upper gi endoscopy,diagnosis (N/A, 1/16/2023); pr upper gi endoscopy,w/dilat,gastric out (N/A, 1/16/2023); pr upper gi endoscopy,biopsy (N/A, 1/16/2023); pr upper gi endoscopy,diagnosis (N/A, 2/8/2023); pr upper gi endoscopy,w/dilat,gastric out (N/A, 2/8/2023); and pr upper gi endoscopy,biopsy (N/A,  "2/8/2023).     Family History  family history includes Cancer (age of onset: 40) in her maternal aunt; Diabetes in her father and sister; Heart Disease in her father; Lung Disease in her mother.   Family history reviewed with patient. There is family history that is pertinent to the chief complaint.     Social History   reports that she has never smoked. She has never used smokeless tobacco. She reports that she does not currently use alcohol after a past usage of about 0.6 oz of alcohol per week. She reports current drug use. Drugs: Marijuana and Inhaled.    Allergies  Allergies   Allergen Reactions    Demerol Hcl [Meperidine] Shortness of Breath and Palpitations    Methotrexate Hives, Shortness of Breath and Rash          Morphine Shortness of Breath and Palpitations    Promethazine Shortness of Breath and Rash          Remicade [Infliximab] Hives, Shortness of Breath and Rash          Sudafed [Pseudoephedrine] Shortness of Breath, Vomiting and Palpitations    Azathioprine Sodium Hives and Shortness of Breath     10/21/2022 - patient unable to verify allergy/reaction  Historical reaction listed: Hives, Shortness of Breath    Carafate [Sucralfate] Vomiting and Nausea     + Mouth Sores    Other Drug Unspecified     \"STEROIDS\" - REACTION NOT SPECIFIED    Sulfa Drugs Rash          Sulfasalazine Rash          Azathioprine Hives    Amitriptyline Unspecified     Nightmares      Ativan [Lorazepam] Unspecified     Nightmares    Betamethasone Unspecified     10/21/2022 - patient unable to verify allergy/reaction  No historical reaction listed    Fentanyl Unspecified     \"Patches didn't work\" and skin broke down    Lyrica [Pregabalin] Nausea          Methadone Unspecified     \"Didn't work\"    Potassium Unspecified     Notes: In IV only  REACTION NOT SPECIFIED    Tizanidine Unspecified     10/21/2022 - patient unable to verify allergy/reaction  No historical reaction listed    Toradol [Ketorolac Tromethamine] Unspecified     " 10/21/2022 - patient unable to verify allergy/reaction, states she was told by her doctor not to take       Medications  Prior to Admission Medications   Prescriptions Last Dose Informant Patient Reported? Taking?   Ascorbic Acid (VITAMIN C PO)  Patient, Family Member Yes No   Sig: Take 1 Tablet by mouth 2 times a day.   Biotin w/ Vitamins C & E (HAIR SKIN & NAILS GUMMIES PO)  Patient, Family Member Yes No   Sig: Take 2 Tablets by mouth every morning.   Multiple Vitamins-Minerals (CENTRUM SILVER 50+WOMEN) Tab  Patient, Family Member Yes No   Sig: Take 2 Tablets by mouth every morning. GUMMIES.   ZINC PICOLINATE PO  Patient, Family Member Yes No   Sig: Take 1 Tablet by mouth 2 times a day.   Patient not taking: Reported on 8/17/2023   amoxicillin-clavulanate (AUGMENTIN) 875-125 MG Tab   No No   Sig: Take 1 Tablet by mouth 2 times a day.   azelastine (ASTELIN) 137 MCG/SPRAY nasal spray   No No   Sig: Administer 1 Spray into affected nostril(S) 2 times a day.   Patient not taking: Reported on 8/17/2023   diazePAM (VALIUM) 5 MG Tab   No No   Sig: Take 1 Tablet by mouth every 6 hours as needed (SPASMS) for up to 30 days.   fluticasone (FLONASE) 50 MCG/ACT nasal spray   No No   Sig: Administer 1 Spray into affected nostril(S) every day.   Patient not taking: Reported on 8/17/2023   furosemide (LASIX) 20 MG Tab  Patient, Family Member No No   Sig: Take 1 Tablet by mouth 1 time a day as needed (leg swelling/weight gain).   granisetron (KYTRIL) 1 MG Tab   No No   Sig: Take 1 Tablet by mouth every 12 hours as needed for Nausea/Vomiting.   hydroxychloroquine (PLAQUENIL) 200 MG Tab   No No   Sig: Take 1.5 Tablets by mouth every day.   levalbuterol (XOPENEX HFA) 45 MCG/ACT inhaler  Patient, Family Member No No   Sig: Inhale 2 Puffs every four hours as needed for Shortness of Breath (As needed for shortness of breath, cough, wheezing.).   methylPREDNISolone (MEDROL DOSEPAK) 4 MG Tablet Therapy Pack   No No   Sig: As directed on  the packaging label.   metoprolol tartrate (LOPRESSOR) 50 MG Tab  Patient, Family Member No No   Sig: Take 1 Tablet by mouth 2 times a day.   naratriptan (AMERGE) 2.5 MG tablet  Patient, Family Member No No   Sig: Take 1 Tablet by mouth as needed for Migraine.   nystatin (MYCOSTATIN) 183100 UNIT/ML Suspension  Patient, Family Member No No   Sig: Take 5 mL by mouth 4 times a day.   nystatin (MYCOSTATIN) 031277 UNIT/ML Suspension   No No   Sig: Take 5 mL by mouth 4 times a day.   ondansetron (ZOFRAN ODT) 4 MG TABLET DISPERSIBLE   No No   Sig: Take 1 Tablet by mouth every 6 hours as needed for Nausea/Vomiting.   ondansetron (ZOFRAN) 4 MG Tab tablet   No No   Sig: Take 1 Tablet by mouth every four hours as needed for Nausea/Vomiting for up to 30 days.   oxyCODONE immediate release (ROXICODONE) 10 MG immediate release tablet   No No   Sig: Take 1 Tablet by mouth every 6 hours as needed for Severe Pain (Refill #3 of #3.) for up to 30 days.   oxyCODONE immediate release (ROXICODONE) 10 MG immediate release tablet   No No   Sig: Take 1 Tablet by mouth every 6 hours as needed for Severe Pain (refill #2 of #3.) for up to 30 days.   potassium chloride SA (KDUR) 20 MEQ Tab CR   No No   Sig: TAKE 1 TABLET BY MOUTH ONCE DAILY AS NEEDED(WHEN YOU TAKE LASIX)   prochlorperazine (COMPAZINE) 10 MG Tab   No No   Sig: Take 1 Tablet by mouth every 6 hours as needed for Nausea/Vomiting.   spironolactone (ALDACTONE) 25 MG Tab   No No   Sig: Take 1 Tablet by mouth every day.   traZODone (DESYREL) 50 MG Tab   No No   Sig: Take 1 Tablet by mouth every evening.      Facility-Administered Medications: None       Physical Exam  Temp:  [37.1 °C (98.8 °F)] 37.1 °C (98.8 °F)  Pulse:  [] 128  Resp:  [12-24] 20  BP: ()/() 88/54  SpO2:  [91 %-99 %] 97 %  Blood Pressure : (!) 88/54   Temperature: 37.1 °C (98.8 °F)   Pulse: (!) 128   Respiration: 20   Pulse Oximetry: 97 %       Physical Exam  Constitutional:       General: She is not  "in acute distress.     Appearance: She is not toxic-appearing.   HENT:      Head: Normocephalic and atraumatic.      Nose: Nose normal.      Mouth/Throat:      Mouth: Mucous membranes are dry.      Pharynx: Oropharynx is clear.   Eyes:      Extraocular Movements: Extraocular movements intact.      Conjunctiva/sclera: Conjunctivae normal.   Cardiovascular:      Rate and Rhythm: Regular rhythm. Tachycardia present.      Pulses: Normal pulses.      Heart sounds: Normal heart sounds.   Pulmonary:      Effort: Pulmonary effort is normal.      Breath sounds: Normal breath sounds.   Abdominal:      General: Abdomen is flat.      Palpations: Abdomen is soft.      Comments: No output in ostomy bag   Musculoskeletal:         General: No swelling or deformity.      Cervical back: Neck supple. No rigidity.   Skin:     General: Skin is warm and dry.      Capillary Refill: Capillary refill takes less than 2 seconds.   Neurological:      General: No focal deficit present.      Mental Status: She is oriented to person, place, and time.         Laboratory:  Recent Labs     10/12/23  2258   WBC 10.3   RBC 4.76   HEMOGLOBIN 14.7   HEMATOCRIT 43.8   MCV 92.0   MCH 30.9   MCHC 33.6   RDW 40.9   PLATELETCT 210   MPV 10.6     Recent Labs     10/12/23  2258   SODIUM 142   POTASSIUM 3.9   CHLORIDE 104   CO2 22   GLUCOSE 97   BUN 23*   CREATININE 1.52*   CALCIUM 10.5     Recent Labs     10/12/23  2258   ALTSGPT 18   ASTSGOT 29   ALKPHOSPHAT 85   TBILIRUBIN 0.5   LIPASE 17   GLUCOSE 97         No results for input(s): \"NTPROBNP\" in the last 72 hours.      No results for input(s): \"TROPONINT\" in the last 72 hours.    Imaging:  CT-ABDOMEN-PELVIS WITH   Final Result      Prior colectomy with LEFT lower quadrant ileostomy   Catheter in place in the urinary bladder. The tip abuts the bladder dome.               Assessment/Plan:  Justification for Admission Status  I anticipate this patient is appropriate for observation status at this time because " lactic acidosis and dehydration requiring IV medication and IV fluids    Patient will need a Med/Surg bed on MEDICAL service .  The need is secondary to above.    * Lactic acid acidosis- (present on admission)  Assessment & Plan  3.9 on presentation  In setting of dehydration, nausea/vomiting  IV fluid resuscitation  Trend lactic acid, have ordered repeat    Essential hypertension- (present on admission)  Assessment & Plan  Continue metoprolol, holding diuretics in setting of dehydration    Tachycardia  Assessment & Plan  Has been persistently in 110's  In setting of dehydration  Have ordered twelve-lead EKG to further assess      Dehydration  Assessment & Plan  With decreased urine output as well as decreased ostomy output  In setting of nausea and vomiting  IV fluids, monitor BMP and urine output, has started to make dark urine after initial IV fluid resuscitation  Antiemetics    Crohn's disease of small intestine with complication (HCC)- (present on admission)  Assessment & Plan  Has ostomy in place.  Follows with Dr. Davalos per patient  Recently completed Medrol Dosepak, will hold off on further steroids as unclear that there is current Crohn's flare given no ostomy output or findings of inflammation on CT abdomen  Check inflammatory markers  Consider GI consult, patient would like her GI doctor contacted        VTE prophylaxis: SCDs/TEDs and heparin ppx

## 2023-10-13 NOTE — ASSESSMENT & PLAN NOTE
3.9 on presentation  In setting of dehydration, nausea/vomiting  IV fluid resuscitation  Trend lactic acid, have ordered repeat  Patient received IV fluid, lactic acidosis resolved  Monitoring.

## 2023-10-13 NOTE — PROGRESS NOTES
Med rec completed per patient at bedside and patient's spouse, whom patient called on her personal phone.  Allergies reviewed with patient.  Patient's preferred [facility]: [facility].    On 10/2/2023 patient was prescribed a Medrol Dosepak, which she states that she finished.  On 10/9/2023 patient was prescribed a course of Nystatin oral suspension. Patient states that she started this medication yesterday 10/12/2023 and has only had one dose at home.    Patient states that she has not had most of her other medications for about 2 weeks due to issues with nausea/vomiting.

## 2023-10-13 NOTE — ED TRIAGE NOTES
Chief Complaint   Patient presents with    Urinary Retention     Since 3 days associated with generalized body pain and abdominal pain  She has milton catheter in place this morning [facility] urologist office, but no urine output since then    Alert and Oriented x 4  Using cane for ambulation     +Ileostomy LLQ  +FC Fr 16    Pain: 10/10    Brought by EMS with the above complaints    Pt is alert and oriented x 4, speaking in full sentences, follows commands and responds appropriately to questions.     Respirations are even and unlabored.

## 2023-10-13 NOTE — RESPIRATORY CARE
COPD EDUCATION by COPD CLINICAL EDUCATOR  10/13/2023 at 8:21 AM by Janki Powell, JIMBO     Patient reviewed by COPD education team. Patient does not have a history or diagnosis of COPD and is a non-smoker.  Therefore, patient does not qualify for the COPD program.

## 2023-10-13 NOTE — PROGRESS NOTES
4 Eyes Skin Assessment Completed by Nithya BARNES and NEVA RN.    Head WDL  Ears WDL  Nose WDL  Mouth WDL  Neck WDL  Breast/Chest WDL  Shoulder Blades WDL  Spine WDL  (R) Arm/Elbow/Hand WDL  (L) Arm/Elbow/Hand WDL  Abdomen with ostomy  Groin WDL  Scrotum/Coccyx/Buttocks WDL  (R) Leg WDL  (L) Leg WDL  (R) Heel/Foot/Toe WDL  (L) Heel/Foot/Toe WDL          Devices In Places n/N/A      Interventions In Place N/A    Possible Skin Injury No    Pictures Uploaded Into Epic Yes  Wound Consult Placed Yes  RN Wound Prevention Protocol Ordered Yes

## 2023-10-13 NOTE — CARE PLAN
The patient is Stable - Low risk of patient condition declining or worsening         Progress made toward(s) clinical / shift goals:      Patient is not progressing towards the following goals:      Problem: Pain - Standard  Goal: Alleviation of pain or a reduction in pain to the patient’s comfort goal  Description: Target End Date:  Prior to discharge or change in level of care    Document on Vitals flowsheet    1.  Document pain using the appropriate pain scale per order or unit policy  2.  Educate and implement non-pharmacologic comfort measures (i.e. relaxation, distraction, massage, cold/heat therapy, etc.)  3.  Pain management medications as ordered  4.  Reassess pain after pain med administration per policy  5.  If opiods administered assess patient's response to pain medication is appropriate per POSS sedation scale  6.  Follow pain management plan developed in collaboration with patient and interdisciplinary team (including palliative care or pain specialists if applicable)  Outcome: Not Progressing  Note: Pt is A&Ox4, reported generalized pain 10/10. Pain managed with pharmacological and non-pharmacological strategies. Pt refuses her meds despite education provided,  stated she can only get IV meds; Dr. Westfall notified.Wound RN assessed pt ostomy site, no output noted from ostomy bag. Po fluids offered and encouraged. Pt will report relief of pain at the end of shift. Safety measures in place.

## 2023-10-13 NOTE — ASSESSMENT & PLAN NOTE
With decreased urine output as well as decreased ostomy output  In setting of nausea and vomiting  IV fluids, monitor BMP and urine output, has started to make dark urine after initial IV fluid resuscitation  Antiemetics    IVF prn  Encouraged oral intake  As per GI patient was able to drink contrast for barium eval w/o problems.

## 2023-10-13 NOTE — ASSESSMENT & PLAN NOTE
Has been persistently in 110's  In setting of dehydration  Have ordered twelve-lead EKG to further assess  Monitoring.   Stable.

## 2023-10-13 NOTE — ED NOTES
Bedside report received from off going RN/tech: Bon BARNES, assumed care of patient.  POC discussed with patient. Call light within reach, all needs addressed at this time.       Fall risk interventions in place: Move the patient closer to the nurse's station, Patient's personal possessions are with in their safe reach, Place socks on patient, Place fall risk sign on patient's door, Give patient urinal if applicable, Keep floor surfaces clean and dry, and Accompanied to restroom (all applicable per [place] Fall risk assessment)   Continuous monitoring: Cardiac Leads, Pulse Ox, or Blood Pressure  IVF/IV medications: Infusion per MAR (List Med(s)) NS infusion  Oxygen: Room Air  Bedside sitter: Not Applicable   Isolation: Not Applicable

## 2023-10-13 NOTE — ED PROVIDER NOTES
"ED Provider Note    CHIEF COMPLAINT  Chief Complaint   Patient presents with    Urinary Retention     Since 3 days associated with generalized body pain and abdominal pain  She has milton catheter in place this morning [facility] urologist office, but no urine output since then    Alert and Oriented x 4  Using cane for ambulation       EXTERNAL RECORDS REVIEWED  Outpatient visit 2-week urology [place] seen earlier today, unable to see notes, history of urinary retention and kidney stone noted.  ED visits recently as well for abdominal pain and vomiting.    HPI/ROS  LIMITATION TO HISTORY   Select: : None    Jana Overton is a 68 y.o. female who presents with complaint of 3 days of generalized body pain, abdominal pain, and now concern for no urine output since a Milton catheter was placed earlier today at the urology office.  Patient states it was placed because of urinary retention, when asked if she was told how much urine was in her bladder she said \"not much\".  She reports low back pain and continued Pain and vomiting over the last week to week and a half.  Denies fevers.  She had a Medrol Dosepak 10/2/2023 which was prescribed by her primary care doctor and she was sent via rems a for evaluation at [facility].  She was seen again at [facility] 10/7/2023, had a CT scan done at that time which showed no abnormalities or signs of obstruction.  Patient states the pain has continued since that time and she is continuing to vomit frequently.  States she has Sean said at home which reminds her to drink every 10 minutes and she says she takes small sips when Sean says that.  Patient cannot tell me who her GI doctor is who manages her Crohn's disease, she is status post ileostomy and multiple surgeries and resections in the past.  Patient states she has a history of kidney stones.    PAST MEDICAL HISTORY  Past Medical History:   Diagnosis Date    Anesthesia     Difficult IV stick    Anxiety     Arthritis     all over "    ASTHMA     Atrial fib/flut     Backpain     Bronchitis     5 years    Chronic pain     Colostomy in place ([facility])     COPD (chronic obstructive pulmonary disease) ([facility])     Crohn's disease of colon ([facility])     Dyspnea     History of cardiac murmur     Ileostomy present ([facility])     Infectious disease     MRSA, VancoRSA    Multiple falls     Narcotic dependence ([facility])     Obstruction     Pain     Pneumonia     child and mid 30's    Postherpetic neuralgia     Rosacea     Sleep apnea         SURGICAL HISTORY  Past Surgical History:   Procedure Laterality Date    [place] UPPER GI ENDOSCOPY,DIAGNOSIS N/A 2/8/2023    Procedure: GASTROSCOPY;  Surgeon: Jamarcus Davalos M.D.;  Location: SURGERY SAME DAY [facility];  Service: Gastroenterology    [place] UPPER GI ENDOSCOPY,W/DILAT,GASTRIC OUT N/A 2/8/2023    Procedure: GASTROSCOPY, WITH BALLOON DILATION;  Surgeon: Jamarcus Davalos M.D.;  Location: SURGERY SAME DAY [facility];  Service: Gastroenterology    [place] UPPER GI ENDOSCOPY,BIOPSY N/A 2/8/2023    Procedure: GASTROSCOPY, WITH BIOPSY;  Surgeon: Jamarcus Davalos M.D.;  Location: SURGERY SAME DAY [facility];  Service: Gastroenterology    [place] UPPER GI ENDOSCOPY,DIAGNOSIS N/A 1/16/2023    Procedure: GASTROSCOPY;  Surgeon: Jamarcus Davalos M.D.;  Location: SURGERY SAME DAY [facility];  Service: Gastroenterology    [place] UPPER GI ENDOSCOPY,W/DILAT,GASTRIC OUT N/A 1/16/2023    Procedure: GASTROSCOPY, WITH BALLOON DILATION;  Surgeon: Jamarcus Davalos M.D.;  Location: SURGERY SAME DAY [facility];  Service: Gastroenterology    [place] UPPER GI ENDOSCOPY,BIOPSY N/A 1/16/2023    Procedure: GASTROSCOPY, WITH BIOPSY;  Surgeon: Jamarcus Davalos M.D.;  Location: SURGERY SAME DAY [facility];  Service: Gastroenterology    [place] UPPER GI ENDOSCOPY,DIAGNOSIS N/A 10/24/2022    Procedure: GASTROSCOPY;  Surgeon: Jamarcus Davalos M.D.;  Location: SURGERY SAME DAY [facility];  Service: Gastroenterology    BILIARY DILATATION N/A 10/24/2022    Procedure: DILATION, STRICTURE, BILE DUCT;  Surgeon: Jamarcus  ROXANNE Davalos;  Location: SURGERY SAME DAY [facility];  Service: Gastroenterology    [place] CYSTOSCOPY,INSERT URETERAL STENT Right 12/23/2021    Procedure: CYSTOSCOPY, WITH URETERAL STENT EXCHANGE;  Surgeon: Jonathan Turcios M.D.;  Location: SURGERY [facility];  Service: Urology    KS CYSTO/URETERO/PYELOSCOPY, DX Right 12/23/2021    Procedure: URETEROSCOPY;  Surgeon: Jonathan Turcios M.D.;  Location: SURGERY [facility];  Service: Urology    [place] CYSTO/URETERO/PYELOSCOPY, DX Right 12/8/2021    Procedure: URETEROSCOPY;  Surgeon: Luc Hook M.D.;  Location: SURGERY [facility];  Service: Urology    CYSTO STENT PLACEMNT PRE SURG Right 12/8/2021    Procedure: CYSTOSCOPY, WITH URETERAL STENT INSERTION;  Surgeon: Luc Hook M.D.;  Location: SURGERY [facility];  Service: Urology    ILEOSTOMY  1/20/2021    Procedure: ILEOSTOMY- COMPLEX REVISION,;  Surgeon: Kevin Cruz M.D.;  Location: [facility];  Service: General    LYSIS ADHESIONS GENERAL  1/20/2021    Procedure: LYSIS, ADHESIONS;  Surgeon: Kevin Cruz M.D.;  Location: [facility];  Service: General    GASTROSCOPY N/A 5/22/2019    Procedure: GASTROSCOPY - WITH DILATION;  Surgeon: Dionicio Cardona M.D.;  Location: [facility];  Service: Gastroenterology    GASTROSCOPY  1/6/2019    Procedure: GASTROSCOPY- WITH DILATION;  Surgeon: Daniel Daniels M.D.;  Location: [facility];  Service: Gastroenterology    ILEOSTOMY  12/29/2018    Procedure: ILEOSTOMY- REVISION;  Surgeon: Kevin Cruz M.D.;  Location: [facility];  Service: General    SIGMOIDOSCOPY FLEX  12/29/2018    Procedure: SIGMOIDOSCOPY FLEX;  Surgeon: Kevin Cruz M.D.;  Location: [facility];  Service: General    EXPLORATORY LAPAROTOMY  12/29/2018    Procedure: EXPLORATORY LAPAROTOMY, lysis of adhesions;  Surgeon: Kevin Cruz M.D.;  Location: [facility];  Service: General    ILEOSTOMY  5/14/2014    Performed by Kevin ALVAREZ  ROXANNE Cruz at SURGERY [facility] ORS    MAMMOPLASTY REDUCTION  7/17/2013    Performed by Janey Burt M.D. at SURGERY [facility] ORS    HARDWARE REMOVAL NEURO  7/16/2012    Performed by KEELEY KIM at SURGERY [facility] ORS    GASTROSCOPY  10/4/2011    Performed by TYSON MARTINEZ at SURGERY SAME DAY [facility]S    COLONOSCOPY  10/4/2011    Performed by TYSON MARTINEZ at SURGERY SAME DAY [facility]S    DILATION AND CURETTAGE  9/24/2010    Performed by GAURAV ALY at SURGERY SAME DAY [facility]S    ILEOSTOMY  11/11/2009    Performed by SYDNEE CRUZ at SURGERY [facility] ORS    GASTROSCOPY WITH BIOPSY  11/22/08    Performed by NEGRITA HUYNH at SURGERY [facility] ORS    ABDOMINAL EXPLORATION      CERVICAL DISK AND FUSION ANTERIOR      COLON RESECTION      FOOT SURGERY      HAND SURGERY      LUMBAR FUSION ANTERIOR      LUMPECTOMY      OTHER ABDOMINAL SURGERY      surgery for chrons disease    TN BREAST REDUCTION          FAMILY HISTORY  Family History   Problem Relation Age of Onset    Lung Disease Mother         copd    Diabetes Father     Heart Disease Father     Diabetes Sister     Cancer Maternal Aunt 40        breast       SOCIAL HISTORY       CURRENT MEDICATIONS  Home Medications    Medication Sig Taking? Last Dose Authorizing Provider   nystatin (MYCOSTATIN) 131645 UNIT/ML Suspension Take 5 mL by mouth 4 times a day.   Chase Charles D.O.   prochlorperazine (COMPAZINE) 10 MG Tab Take 1 Tablet by mouth every 6 hours as needed for Nausea/Vomiting.   Chase Charles D.O.   ondansetron (ZOFRAN) 4 MG Tab tablet Take 1 Tablet by mouth every four hours as needed for Nausea/Vomiting for up to 30 days.   Chase Charles D.O.   methylPREDNISolone (MEDROL DOSEPAK) 4 MG Tablet Therapy Pack As directed on the packaging label.   Chase Charles D.O.   spironolactone (ALDACTONE) 25 MG Tab Take 1 Tablet by mouth every day.   KEREN Bach   diazePAM (VALIUM) 5 MG Tab  Take 1 Tablet by mouth every 6 hours as needed (SPASMS) for up to 30 days.   Chase Charles D.O.   oxyCODONE immediate release (ROXICODONE) 10 MG immediate release tablet Take 1 Tablet by mouth every 6 hours as needed for Severe Pain (Refill #3 of #3.) for up to 30 days.   Chase Charles D.O.   oxyCODONE immediate release (ROXICODONE) 10 MG immediate release tablet Take 1 Tablet by mouth every 6 hours as needed for Severe Pain (refill #2 of #3.) for up to 30 days.   Chase Charles D.O.   potassium chloride SA (KDUR) 20 MEQ Tab CR TAKE 1 TABLET BY MOUTH ONCE DAILY AS NEEDED(WHEN YOU TAKE LASIX)   CASSIE Bach.   amoxicillin-clavulanate (AUGMENTIN) 875-125 MG Tab Take 1 Tablet by mouth 2 times a day.   Chase Charles D.O.   traZODone (DESYREL) 50 MG Tab Take 1 Tablet by mouth every evening.   Chase Charles D.O.   azelastine (ASTELIN) 137 MCG/SPRAY nasal spray Administer 1 Spray into affected nostril(S) 2 times a day.  Patient not taking: Reported on 8/17/2023   Chase Charles D.O.   fluticasone (FLONASE) 50 MCG/ACT nasal spray Administer 1 Spray into affected nostril(S) every day.  Patient not taking: Reported on 8/17/2023   Chase Charles D.O.   hydroxychloroquine (PLAQUENIL) 200 MG Tab Take 1.5 Tablets by mouth every day.   Petar Lewis M.D.   ondansetron (ZOFRAN ODT) 4 MG TABLET DISPERSIBLE Take 1 Tablet by mouth every 6 hours as needed for Nausea/Vomiting.   Chase Charles D.O.   granisetron (KYTRIL) 1 MG Tab Take 1 Tablet by mouth every 12 hours as needed for Nausea/Vomiting.   Chase Charles D.O.   nystatin (MYCOSTATIN) 468811 UNIT/ML Suspension Take 5 mL by mouth 4 times a day.   Chase Charles D.O.   metoprolol tartrate (LOPRESSOR) 50 MG Tab Take 1 Tablet by mouth 2 times a day.   Harvey Monahan M.D.   levalbuterol (XOPENEX HFA) 45 MCG/ACT inhaler Inhale 2 Puffs every four hours as needed for Shortness of Breath (As needed for shortness of breath,  cough, wheezing.).   Man Tinoco M.D.   Multiple Vitamins-Minerals (CENTRUM SILVER 50+WOMEN) Tab Take 2 Tablets by mouth every morning. GUMMIES.   Physician Outpatient   ZINC PICOLINATE PO Take 1 Tablet by mouth 2 times a day.  Patient not taking: Reported on 8/17/2023   Physician Outpatient   Ascorbic Acid (VITAMIN C PO) Take 1 Tablet by mouth 2 times a day.   Physician Outpatient   Biotin w/ Vitamins C & E (HAIR SKIN & NAILS GUMMIES PO) Take 2 Tablets by mouth every morning.   Physician Outpatient   naratriptan (AMERGE) 2.5 MG tablet Take 1 Tablet by mouth as needed for Migraine.   Chase Charles D.O.   furosemide (LASIX) 20 MG Tab Take 1 Tablet by mouth 1 time a day as needed (leg swelling/weight gain).   Harvey Monahan M.D.       ALLERGIES  No Known Allergies    PHYSICAL EXAM  BP (!) 148/71   Pulse (!) 112   Temp 37.1 °C (98.8 °F) (Oral)   Resp 20   Ht 1.829 m (6')   Wt 61.7 kg (136 lb)   SpO2 96%   Constitutional: Alert in no apparent distress.  HENT: No signs of trauma, Bilateral external ears normal, Nose normal.   Eyes: Pupils are equal and reactive, Conjunctiva normal, Non-icteric.   Neck: Normal range of motion, No tenderness, Supple, No stridor.   Cardiovascular: Tachycardic, regular rhythm, no murmurs.   Thorax & Lungs: Normal breath sounds, No respiratory distress, No wheezing  Abdomen: Soft, diffuse generalized tenderness, No peritoneal signs, No masses, No pulsatile masses  : Sage catheter in place, small amount of dark yellow urine in bag/tubing  Skin: Warm, Dry, No erythema, No rash.   Extremities: Intact distal pulses, No edema, No tenderness, No cyanosis  Neurologic: Alert , Normal motor function, Normal speech, No focal deficits noted.       DIAGNOSTIC STUDIES / PROCEDURES    LABS & EKG    Results for orders placed or performed during the hospital encounter of 10/12/23   CBC WITH DIFFERENTIAL   Result Value Ref Range    WBC 10.3 4.8 - 10.8 K/uL    RBC 4.76 4.20 - 5.40 M/uL     Hemoglobin 14.7 12.0 - 16.0 g/dL    Hematocrit 43.8 37.0 - 47.0 %    MCV 92.0 81.4 - 97.8 fL    MCH 30.9 27.0 - 33.0 pg    MCHC 33.6 32.2 - 35.5 g/dL    RDW 40.9 35.9 - 50.0 fL    Platelet Count 210 164 - 446 K/uL    MPV 10.6 9.0 - 12.9 fL    Neutrophils-Polys 77.00 (H) 44.00 - 72.00 %    Lymphocytes 11.90 (L) 22.00 - 41.00 %    Monocytes 9.70 0.00 - 13.40 %    Eosinophils 0.50 0.00 - 6.90 %    Basophils 0.40 0.00 - 1.80 %    Immature Granulocytes 0.50 0.00 - 0.90 %    Nucleated RBC 0.00 0.00 - 0.20 /100 WBC    Neutrophils (Absolute) 7.92 (H) 1.82 - 7.42 K/uL    Lymphs (Absolute) 1.22 1.00 - 4.80 K/uL    Monos (Absolute) 1.00 (H) 0.00 - 0.85 K/uL    Eos (Absolute) 0.05 0.00 - 0.51 K/uL    Baso (Absolute) 0.04 0.00 - 0.12 K/uL    Immature Granulocytes (abs) 0.05 0.00 - 0.11 K/uL    NRBC (Absolute) 0.00 K/uL   COMP METABOLIC PANEL   Result Value Ref Range    Sodium 142 135 - 145 mmol/L    Potassium 3.9 3.6 - 5.5 mmol/L    Chloride 104 96 - 112 mmol/L    Co2 22 20 - 33 mmol/L    Anion Gap 16.0 7.0 - 16.0    Glucose 97 65 - 99 mg/dL    Bun 23 (H) 8 - 22 mg/dL    Creatinine 1.52 (H) 0.50 - 1.40 mg/dL    Calcium 10.5 8.5 - 10.5 mg/dL    Correct Calcium 9.9 8.5 - 10.5 mg/dL    AST(SGOT) 29 12 - 45 U/L    ALT(SGPT) 18 2 - 50 U/L    Alkaline Phosphatase 85 30 - 99 U/L    Total Bilirubin 0.5 0.1 - 1.5 mg/dL    Albumin 4.7 3.2 - 4.9 g/dL    Total Protein 7.8 6.0 - 8.2 g/dL    Globulin 3.1 1.9 - 3.5 g/dL    A-G Ratio 1.5 g/dL   LACTIC ACID   Result Value Ref Range    Lactic Acid 3.9 (H) 0.5 - 2.0 mmol/L   LIPASE   Result Value Ref Range    Lipase 17 11 - 82 U/L   ESTIMATED GFR   Result Value Ref Range    GFR (CKD-EPI) 37 (A) >60 mL/min/1.73 m 2   URINALYSIS,CULTURE IF INDICATED    Specimen: Urine   Result Value Ref Range    Color Yellow     Character Cloudy (A)     Specific Gravity 1.022 <1.035    Ph 5.0 5.0 - 8.0    Glucose Negative Negative mg/dL    Ketones Trace (A) Negative mg/dL    Protein 300 (A) Negative mg/dL     Bilirubin Negative Negative    Urobilinogen, Urine 0.2 Negative    Nitrite Negative Negative    Leukocyte Esterase Negative Negative    Occult Blood Large (A) Negative    Micro Urine Req Microscopic    URINE MICROSCOPIC (W/UA)   Result Value Ref Range    WBC 5-10 (A) /hpf    RBC >150 (A) /hpf    Bacteria Negative None /hpf    Epithelial Cells Negative /hpf    Hyaline Cast 0-2 /lpf     *Note: Due to a large number of results and/or encounters for the requested time period, some results have not been displayed. A complete set of results can be found in Results Review.       RADIOLOGY  I have independently interpreted the diagnostic imaging associated with this visit and am waiting the final reading from the radiologist.   My preliminary interpretation is a follows: No obvious signs of obstruction or mass lesions, ileostomy seen, Sage catheter seen in bladder    Radiologist interpretation:   CT-ABDOMEN-PELVIS WITH   Final Result      Prior colectomy with LEFT lower quadrant ileostomy   Catheter in place in the urinary bladder. The tip abuts the bladder dome.             COURSE & MEDICAL DECISION MAKING    ED Observation Status? No; Patient does not meet criteria for ED Observation.     INITIAL ASSESSMENT, COURSE AND PLAN  Care Narrative: 68-year-old female presenting for concern for urinary retention after Sage catheter placed earlier today not putting out urine.  There is urine in the bag so it does appear to be functioning, bladder scan shows only 50 cc in the bladder, given history of continued abdominal pain, vomiting, poor p.o. intake, catheter does appear to be functioning and it is not entirely clear why it was initially placed, greater concern for dehydration due to persistent nausea, vomiting, abdominal pain and concern for Crohn's flare.  Patient with recent CT for the same symptoms that showed no evidence of obstruction, mass lesion.  Her ostomy is continuing to put out stool so I do not feel repeat  imaging is indicated at this time.  Will place IV, hydrate with fluids, check labs.  She is tachycardic and given her low urine output I suspect she is dehydrated. May require admission for continued hydration, evaluation by GI given her persistent symptoms.    11:46 PM  Patient's labs with slight elevation of creatinine, not significantly changed from a few days ago but higher than baseline.  No significant electrolyte abnormalities.  Lactic is quite elevated at 3.9.  LFTs are normal, lipase is normal, no increased WBC.  Lactate may be related to dehydration or from underlying sepsis.  We will go ahead and cover with ceftriaxone, give additional fluids, CT of the abdomen pelvis and check urine for infection.  Anticipate admission.    1:01 AM  Notified by nursing that patient requesting me to come back and talk with her. Requesting more pain medicine-- dilaudid and diazepam for her back pain and spasms. Working on PO contrast. Requesting more zofran as well.     2:48 AM  CT with no signs of obstruction, no evidence of infection, no significant changes compared to prior.  Patient will be admitted to hospitalist service.    3:06 AM  Accepted for admission by Dr. Rodriguez-- hospitalist     HYDRATION: Based on the patient's presentation of Dehydration, Sepsis, and Tachycardia the patient was given IV fluids. IV Hydration was used because oral hydration was not adequate alone. Upon recheck following hydration, the patient was still dehydrated.      ADDITIONAL PROBLEM LIST    Dehydration  Sepsis  Vomiting--chronic  Generalized abdominal pain-- chronic    DISPOSITION AND DISCUSSIONS  I have discussed management of the patient with the following physicians and LOTTIE's:  Dr. Rodriguez-- hospitalist     Decision tools and prescription drugs considered including, but not limited to: Antibiotics ceftriaxone .    Admitted to hospitalist in guarded condition    FINAL DIAGNOSIS  1. Dehydration        2. FLORES (acute kidney injury) (HCC)         3. Sepsis with acute organ dysfunction without septic shock, due to unspecified organism, unspecified organ dysfunction type (HCC)              Electronically signed by: Sharita Huerta M.D., 10/12/23 10:33 PM

## 2023-10-14 PROBLEM — R10.9 ABDOMINAL PAIN: Status: ACTIVE | Noted: 2023-10-14

## 2023-10-14 PROBLEM — E46 PROTEIN CALORIE MALNUTRITION (HCC): Status: ACTIVE | Noted: 2023-10-14

## 2023-10-14 LAB
ALBUMIN SERPL BCP-MCNC: 3.8 G/DL (ref 3.2–4.9)
ALBUMIN/GLOB SERPL: 1.4 G/DL
ALP SERPL-CCNC: 72 U/L (ref 30–99)
ALT SERPL-CCNC: 15 U/L (ref 2–50)
ANION GAP SERPL CALC-SCNC: 11 MMOL/L (ref 7–16)
AST SERPL-CCNC: 29 U/L (ref 12–45)
BACTERIA UR CULT: NORMAL
BILIRUB SERPL-MCNC: 0.5 MG/DL (ref 0.1–1.5)
BUN SERPL-MCNC: 14 MG/DL (ref 8–22)
CALCIUM ALBUM COR SERPL-MCNC: 9.6 MG/DL (ref 8.5–10.5)
CALCIUM SERPL-MCNC: 9.4 MG/DL (ref 8.5–10.5)
CHLORIDE SERPL-SCNC: 105 MMOL/L (ref 96–112)
CO2 SERPL-SCNC: 24 MMOL/L (ref 20–33)
CREAT SERPL-MCNC: 1.09 MG/DL (ref 0.5–1.4)
ERYTHROCYTE [DISTWIDTH] IN BLOOD BY AUTOMATED COUNT: 41.2 FL (ref 35.9–50)
GFR SERPLBLD CREATININE-BSD FMLA CKD-EPI: 55 ML/MIN/1.73 M 2
GLOBULIN SER CALC-MCNC: 2.7 G/DL (ref 1.9–3.5)
GLUCOSE SERPL-MCNC: 104 MG/DL (ref 65–99)
HCT VFR BLD AUTO: 38.7 % (ref 37–47)
HGB BLD-MCNC: 13 G/DL (ref 12–16)
MAGNESIUM SERPL-MCNC: 1.4 MG/DL (ref 1.5–2.5)
MCH RBC QN AUTO: 30.5 PG (ref 27–33)
MCHC RBC AUTO-ENTMCNC: 33.6 G/DL (ref 32.2–35.5)
MCV RBC AUTO: 90.8 FL (ref 81.4–97.8)
PHOSPHATE SERPL-MCNC: 1.7 MG/DL (ref 2.5–4.5)
PLATELET # BLD AUTO: 148 K/UL (ref 164–446)
PMV BLD AUTO: 10.4 FL (ref 9–12.9)
POTASSIUM SERPL-SCNC: 3.2 MMOL/L (ref 3.6–5.5)
PROT SERPL-MCNC: 6.5 G/DL (ref 6–8.2)
RBC # BLD AUTO: 4.26 M/UL (ref 4.2–5.4)
SIGNIFICANT IND 70042: NORMAL
SITE SITE: NORMAL
SODIUM SERPL-SCNC: 140 MMOL/L (ref 135–145)
SOURCE SOURCE: NORMAL
WBC # BLD AUTO: 7.8 K/UL (ref 4.8–10.8)

## 2023-10-14 PROCEDURE — 84100 ASSAY OF PHOSPHORUS: CPT

## 2023-10-14 PROCEDURE — 83735 ASSAY OF MAGNESIUM: CPT

## 2023-10-14 PROCEDURE — 700111 HCHG RX REV CODE 636 W/ 250 OVERRIDE (IP): Mod: JZ | Performed by: STUDENT IN AN ORGANIZED HEALTH CARE EDUCATION/TRAINING PROGRAM

## 2023-10-14 PROCEDURE — 80053 COMPREHEN METABOLIC PANEL: CPT

## 2023-10-14 PROCEDURE — 700101 HCHG RX REV CODE 250: Performed by: STUDENT IN AN ORGANIZED HEALTH CARE EDUCATION/TRAINING PROGRAM

## 2023-10-14 PROCEDURE — 36415 COLL VENOUS BLD VENIPUNCTURE: CPT

## 2023-10-14 PROCEDURE — 700102 HCHG RX REV CODE 250 W/ 637 OVERRIDE(OP): Performed by: STUDENT IN AN ORGANIZED HEALTH CARE EDUCATION/TRAINING PROGRAM

## 2023-10-14 PROCEDURE — G0378 HOSPITAL OBSERVATION PER HR: HCPCS

## 2023-10-14 PROCEDURE — 99222 1ST HOSP IP/OBS MODERATE 55: CPT | Performed by: INTERNAL MEDICINE

## 2023-10-14 PROCEDURE — 85027 COMPLETE CBC AUTOMATED: CPT

## 2023-10-14 PROCEDURE — 92610 EVALUATE SWALLOWING FUNCTION: CPT

## 2023-10-14 PROCEDURE — 99233 SBSQ HOSP IP/OBS HIGH 50: CPT | Performed by: STUDENT IN AN ORGANIZED HEALTH CARE EDUCATION/TRAINING PROGRAM

## 2023-10-14 PROCEDURE — A9270 NON-COVERED ITEM OR SERVICE: HCPCS | Performed by: STUDENT IN AN ORGANIZED HEALTH CARE EDUCATION/TRAINING PROGRAM

## 2023-10-14 RX ORDER — MAGNESIUM SULFATE HEPTAHYDRATE 40 MG/ML
2 INJECTION, SOLUTION INTRAVENOUS ONCE
Status: COMPLETED | OUTPATIENT
Start: 2023-10-14 | End: 2023-10-14

## 2023-10-14 RX ADMIN — ONDANSETRON 4 MG: 2 INJECTION INTRAMUSCULAR; INTRAVENOUS at 05:41

## 2023-10-14 RX ADMIN — NYSTATIN 500000 UNITS: 100000 SUSPENSION ORAL at 18:15

## 2023-10-14 RX ADMIN — HYDROMORPHONE HYDROCHLORIDE 1 MG: 1 INJECTION, SOLUTION INTRAMUSCULAR; INTRAVENOUS; SUBCUTANEOUS at 21:19

## 2023-10-14 RX ADMIN — NYSTATIN 500000 UNITS: 100000 SUSPENSION ORAL at 09:20

## 2023-10-14 RX ADMIN — POTASSIUM PHOSPHATE, MONOBASIC AND POTASSIUM PHOSPHATE, DIBASIC 30 MMOL: 224; 236 INJECTION, SOLUTION, CONCENTRATE INTRAVENOUS at 16:15

## 2023-10-14 RX ADMIN — HYDROMORPHONE HYDROCHLORIDE 1 MG: 1 INJECTION, SOLUTION INTRAMUSCULAR; INTRAVENOUS; SUBCUTANEOUS at 09:14

## 2023-10-14 RX ADMIN — MAGNESIUM SULFATE HEPTAHYDRATE 2 G: 2 INJECTION, SOLUTION INTRAVENOUS at 09:19

## 2023-10-14 RX ADMIN — DIAZEPAM 5 MG: 5 INJECTION, SOLUTION INTRAMUSCULAR; INTRAVENOUS at 14:46

## 2023-10-14 RX ADMIN — HYDROMORPHONE HYDROCHLORIDE 1 MG: 1 INJECTION, SOLUTION INTRAMUSCULAR; INTRAVENOUS; SUBCUTANEOUS at 03:42

## 2023-10-14 RX ADMIN — DIAZEPAM 5 MG: 5 INJECTION, SOLUTION INTRAMUSCULAR; INTRAVENOUS at 22:48

## 2023-10-14 RX ADMIN — DIAZEPAM 5 MG: 5 INJECTION, SOLUTION INTRAMUSCULAR; INTRAVENOUS at 05:41

## 2023-10-14 ASSESSMENT — ENCOUNTER SYMPTOMS
ABDOMINAL PAIN: 1
NAUSEA: 1
VOMITING: 1

## 2023-10-14 ASSESSMENT — PAIN DESCRIPTION - PAIN TYPE
TYPE: CHRONIC PAIN
TYPE: CHRONIC PAIN

## 2023-10-14 NOTE — ASSESSMENT & PLAN NOTE
Diffuse abdominal pain, nausea and vomiting in the setting of Crohn disease, status post colectomy  CT abdomen unremarkable for acute pathology.  ESR/CRP negative.  Denies bloody stool  Unclear etiology  Refused to take p.o. medications and diet. Requested iv dilaudid   GI rec caloproectin and speech evaluation, which are ordered  Multimodal pain managements including po and iv narcotics prn. Monitoring respiratory status and sedation score    1015: Patient reports abdominal pain, nausea and vomiting  Refuse to take po meds  She is able to take some of po diet  Pending esophagram   10/16: She reports significant abdominal pain, nausea and vomiting. Refused to take po meds  Esophagram reviewed  I have discussed with GI  Minimize narcotics use.     Continue c/o abdominal pain. Discussed with GI, they are recommending f/u as outpatient with tertiary center.   C/o abdominal pain but she is ambulating w/o any signs of pain or distress. Ostomy working.     Monitoring.

## 2023-10-14 NOTE — PROGRESS NOTES
"Pt called RN via call light to ask for more lotion to \"masturbate\" patient continued to discuss masturbation and make sexually inappropriate comments. Pt continued to complain about various other topics including that she would not like a suppository - at no point has the patient been given or offered a suppository. She indicates in past visits other nurses have came in \"during the night\" and \"in the dark\" to give her a suppository.  "

## 2023-10-14 NOTE — PROGRESS NOTES
"Notified attending patient's demanding to see doctor immediately \"eye to eye.\"  Jb Almanza at bedside 0200. Pt extremely upset because of pain medication. Attending verbally indicated to patient he will not be increasing her narcotics dose. Patient indicated to attending that she needs to \"orgasm to have pain relief.\"  Pt indicated to secondary RN that she needs a private room to masturbate and orgasm to achieve pain relief.      Secondly, patient indicates she has a severe migraine and that \"no one has turned off the lights.\" The lights have been used at the patient's discretion via her call light and have been dimmed during quiet hours.     Pt insists on walking. Walked w/ x1 assistance of  secondary RN.  Patient gate steady.Pt continued to walk  w/ supervision.      Pt  continued to complain about privacy concerns stating we are \"breaking the law\" d/t \"HIPAA\" because she can hear everything that is discussed with her roommate.     Pt continues to utilize call light inappropriately - reoriented to call light use. Pt indicated she will  continue to press the call light until the doctor gives her pain relief.      "

## 2023-10-14 NOTE — PROGRESS NOTES
Patient came up to nurse station and asked this writer to call  to address some issues. Patient's  mentioned about the size of room and that the bathroom was not equipped for patient with ostomy. This writer advised patient's  that the CNAs are more than willing to help patient with ostomy care, that unfortunately we do have small bathroom as this is an old building. Another issue that patient's  brought up was pain medication and claimed that our hospitalist are not addressing it and that they are just hiding away in some room behind a computer. Advised patient's  that the attending has seen patient several times during the day and on call NOC hospitalist has seen patient. Patient's  then mentioned that they do not trust travel nurses due to past bad experience. This writer apologized and reassured patient's  that the travel nurses are just as capable of caring for patient. Prior to talking to patient's , this writer went to patient's room to witness that bedside RN was giving patient Dilaudid and patient was questioning her nurse' credentials. Ended phone call with thanking  for taking the call, and  saying thank you. After a few minutes of talking to patient's , patient came to nurse station, confronting this writer that this writer has been rude to her  and not doing my job. Patient went on about this writer being a local as she looked at my badge, to be a core employee. Patient accused bedside RN to have put in codes on her phone. Asked how RN was able to do that, patient would switch to another topic. Conversation went from the main issue to her being a  brat.

## 2023-10-14 NOTE — PROGRESS NOTES
Charge RN at bedside to witness pain medication administration. Pt stated  attempted to call unit to complain. Charge RN obtained #  of spouse and agreed to call spouse.

## 2023-10-14 NOTE — CONSULTS
Date of Consultation:  10/14/2023    Patient: : Jana Overton  MRN: [id number]    Referring Physician:  Dr. Milka Westfall      GI:Jamarcus Davalos M.D.     Reason for Consultation:   Abd pain, N/V, ?dysphagia.     History of Present Illness:   The patient is a 67 years old female with a medical history of rheumatoid disease, back pain, Crohn's s/p resection of a bowel surgery, no regular GI doctor, not on any anti-Crohn's disease, [facility] GI team saw the patient twice in the last year for dysplasia, the patient had 2 upper GI scope as inpatient.    This time, the patient is admitted for urinary tract obstruction, possible UTI, imbalance fluid status.     GI team is consulted for abdominal pain mainly.     So far primary team have CAT scan done, negative CRP and ESR.  No evident bleeding of GI based on output in the ostomy.     Patient is able to drink water and is on clear liquid.  However intermittently the patient feels nausea vomiting after the oral intake sometimes after hours sometime after 10 minutes.  No specific finding by the bedside exam.    Past Medical History:   Diagnosis Date    Anesthesia     Difficult IV stick    Anxiety     Arthritis     all over    ASTHMA     Atrial fib/flut     Backpain     Bronchitis     5 years    Chronic pain     Colostomy in place (HCC)     COPD (chronic obstructive pulmonary disease) ([facility])     Crohn's disease of colon (HCC)     Dyspnea     History of cardiac murmur     Ileostomy present ([facility])     Infectious disease     MRSA, VancoRSA    Multiple falls     Narcotic dependence ([facility])     Obstruction     Pain     Pneumonia     child and mid 30's    Postherpetic neuralgia     Rosacea     Sleep apnea          Past Surgical History:   Procedure Laterality Date    [place] UPPER GI ENDOSCOPY,DIAGNOSIS N/A 2/8/2023    Procedure: GASTROSCOPY;  Surgeon: Jamarcus Davalos M.D.;  Location: SURGERY SAME DAY [facility];  Service: Gastroenterology    NH UPPER GI ENDOSCOPY,W/DILAT,GASTRIC OUT N/A  2/8/2023    Procedure: GASTROSCOPY, WITH BALLOON DILATION;  Surgeon: Jamarcus Davalos M.D.;  Location: SURGERY SAME DAY [facility];  Service: Gastroenterology    [place] UPPER GI ENDOSCOPY,BIOPSY N/A 2/8/2023    Procedure: GASTROSCOPY, WITH BIOPSY;  Surgeon: Jamarcus Davalos M.D.;  Location: SURGERY SAME DAY [facility];  Service: Gastroenterology    [place] UPPER GI ENDOSCOPY,DIAGNOSIS N/A 1/16/2023    Procedure: GASTROSCOPY;  Surgeon: Jamarcus Davalos M.D.;  Location: SURGERY SAME DAY [facility];  Service: Gastroenterology    [place] UPPER GI ENDOSCOPY,W/DILAT,GASTRIC OUT N/A 1/16/2023    Procedure: GASTROSCOPY, WITH BALLOON DILATION;  Surgeon: Jamarcus Davalos M.D.;  Location: SURGERY SAME DAY [facility];  Service: Gastroenterology    [place] UPPER GI ENDOSCOPY,BIOPSY N/A 1/16/2023    Procedure: GASTROSCOPY, WITH BIOPSY;  Surgeon: Jamarcus Davalos M.D.;  Location: SURGERY SAME DAY [facility];  Service: Gastroenterology    [place] UPPER GI ENDOSCOPY,DIAGNOSIS N/A 10/24/2022    Procedure: GASTROSCOPY;  Surgeon: Jamarcus Davalos M.D.;  Location: SURGERY SAME DAY [facility];  Service: Gastroenterology    BILIARY DILATATION N/A 10/24/2022    Procedure: DILATION, STRICTURE, BILE DUCT;  Surgeon: Jamarcus Davalos M.D.;  Location: SURGERY SAME DAY [facility];  Service: Gastroenterology    [place] CYSTOSCOPY,INSERT URETERAL STENT Right 12/23/2021    Procedure: CYSTOSCOPY, WITH URETERAL STENT EXCHANGE;  Surgeon: Jonathan Turcios M.D.;  Location: SURGERY [facility];  Service: Urology    [place] CYSTO/URETERO/PYELOSCOPY, DX Right 12/23/2021    Procedure: URETEROSCOPY;  Surgeon: Jonathan Turcios M.D.;  Location: SURGERY [facility];  Service: Urology    [place] CYSTO/URETERO/PYELOSCOPY, DX Right 12/8/2021    Procedure: URETEROSCOPY;  Surgeon: Luc Hook M.D.;  Location: [facility];  Service: Urology    CYSTO STENT PLACEMNT PRE SURG Right 12/8/2021    Procedure: CYSTOSCOPY, WITH URETERAL STENT INSERTION;  Surgeon: Luc Hook M.D.;  Location: SURGERY [facility];  Service:  Urology    ILEOSTOMY  1/20/2021    Procedure: ILEOSTOMY- COMPLEX REVISION,;  Surgeon: Kevin Cruz M.D.;  Location: SURGERY [facility];  Service: General    LYSIS ADHESIONS GENERAL  1/20/2021    Procedure: LYSIS, ADHESIONS;  Surgeon: Kevin Cruz M.D.;  Location: SURGERY [facility];  Service: General    GASTROSCOPY N/A 5/22/2019    Procedure: GASTROSCOPY - WITH DILATION;  Surgeon: Dionicio Cardona M.D.;  Location: SURGERY [facility];  Service: Gastroenterology    GASTROSCOPY  1/6/2019    Procedure: GASTROSCOPY- WITH DILATION;  Surgeon: Daniel Daniels M.D.;  Location: SURGERY [facility];  Service: Gastroenterology    ILEOSTOMY  12/29/2018    Procedure: ILEOSTOMY- REVISION;  Surgeon: Kevin Cruz M.D.;  Location: SURGERY [facility];  Service: General    SIGMOIDOSCOPY FLEX  12/29/2018    Procedure: SIGMOIDOSCOPY FLEX;  Surgeon: Kevin Cruz M.D.;  Location: SURGERY [facility];  Service: General    EXPLORATORY LAPAROTOMY  12/29/2018    Procedure: EXPLORATORY LAPAROTOMY, lysis of adhesions;  Surgeon: Kevin Cruz M.D.;  Location: SURGERY [facility];  Service: General    ILEOSTOMY  5/14/2014    Performed by Kevin Cruz M.D. at SURGERY [facility]    MAMMOPLASTY REDUCTION  7/17/2013    Performed by Janey Burt M.D. at [facility] ORS    HARDWARE REMOVAL NEURO  7/16/2012    Performed by KEELEY KIM at SURGERY [facility] ORS    GASTROSCOPY  10/4/2011    Performed by TYSON MARTINEZ at SURGERY SAME DAY [facility] ORS    COLONOSCOPY  10/4/2011    Performed by TYSON MARTINEZ at SURGERY SAME DAY [facility] ORS    DILATION AND CURETTAGE  9/24/2010    Performed by GAURAV ALY [facility]ERY [facility]    ILEOSTOMY  11/11/2009    Performed by KEVIN CRUZ at [facility] ORS    GASTROSCOPY WITH BIOPSY  11/22/08    Performed by NEGRITA HUYNH at [facility]    ABDOMINAL EXPLORATION      CERVICAL DISK AND FUSION ANTERIOR       COLON RESECTION      FOOT SURGERY      HAND SURGERY      LUMBAR FUSION ANTERIOR      LUMPECTOMY      OTHER ABDOMINAL SURGERY      surgery for chrons disease    IN BREAST REDUCTION         Family History   Problem Relation Age of Onset    Lung Disease Mother         copd    Diabetes Father     Heart Disease Father     Diabetes Sister     Cancer Maternal Aunt 40        breast     Constitutional: No fevers.  Eyes: No symptoms reported.   Ears/Nose/Throat/Mouth: No choking reported.  Cardiovascular: No chest pain reported.   Respiratory: Denies shortness of breath.  Gastrointestinal/Hepatic: Per H/P.   Skin/Breast: No new rash reported.   Psychiatric: No complaints    HEENT: grossly normal.  Cardiovascular: Normal heart rate.  Lungs: Respiratory effort is normal.   Abdomen: some abd discomfort.   Skin: No erythema, No rash.  Lower limbs: normal, POS pitting edema.   Neurologic: Alert & oriented x 3, Normal motor function, No focal deficits noted.  PSY: stable mood.       Social History     Socioeconomic History    Marital status:     Highest education level: Associate degree: academic program   Tobacco Use    Smoking status: Never    Smokeless tobacco: Never    Tobacco comments:     second hand smoke parents - smoked for only 1 year many years ago   Vaping Use    Vaping Use: Every day    Substances: THC   Substance and Sexual Activity    Alcohol use: Not Currently     Alcohol/week: 0.6 oz     Types: 1 Shots of liquor per week     Comment: gin and half a lime; tonic water    Drug use: Yes     Types: Marijuana, Inhaled     Comment: medical marijuana through bong/vape     Social Determinants of Health     Financial Resource Strain: Unknown (1/15/2023)    Overall Financial Resource Strain (CARDIA)     Difficulty of Paying Living Expenses: Patient refused   Food Insecurity: Unknown (1/15/2023)    Hunger Vital Sign     Worried About Running Out of Food in the Last Year: Patient refused     Ran Out of Food in the Last  Year: Patient refused   Transportation Needs: Unknown (1/15/2023)    PRAPARE - Transportation     Lack of Transportation (Medical): Patient refused     Lack of Transportation (Non-Medical): Patient refused   Physical Activity: Sufficiently Active (1/15/2023)    Exercise Vital Sign     Days of Exercise per Week: 7 days     Minutes of Exercise per Session: 60 min   Stress: No Stress Concern Present (11/4/2021)    [place] [employer] - Occupational Stress Questionnaire     Feeling of Stress : Not at all   Social Connections: Socially Integrated (1/15/2023)    Social Connection and Isolation Panel [NHANES]     Frequency of Communication with Friends and Family: Three times a week     Frequency of Social Gatherings with Friends and Family: Once a week     Attends [employer] Services: More than 4 times per year     Active Member of Clubs or Organizations: Yes     Attends Club or Organization Meetings: More than 4 times per year     Marital Status:    Housing Stability: Unknown (1/15/2023)    Housing Stability Vital Sign     Unable to Pay for Housing in the Last Year: Patient refused     Number of Places Lived in the Last Year: 1     Unstable Housing in the Last Year: No           Physical Exam:  Vitals:    10/13/23 1600 10/13/23 1941 10/14/23 0308 10/14/23 0734   BP: (!) 120/90 (!) 144/65 138/67 125/56   Pulse: (!) 119 (!) 114 (!) 130 97   Resp: 19 19 19 16   Temp: 36.6 °C (97.9 °F) 36.7 °C (98 °F) 36.7 °C (98 °F) 36.4 °C (97.6 °F)   TempSrc: Temporal Temporal Temporal Temporal   SpO2: 94% 93% 94% 97%   Weight:       Height:                 Labs:  Recent Labs     10/12/23  2258   WBC 10.3   RBC 4.76   HEMOGLOBIN 14.7   HEMATOCRIT 43.8   MCV 92.0   MCH 30.9   MCHC 33.6   RDW 40.9   PLATELETCT 210   MPV 10.6     Recent Labs     10/12/23  2258   SODIUM 142   POTASSIUM 3.9   CHLORIDE 104   CO2 22   GLUCOSE 97   BUN 23*           Recent Labs     10/12/23  2258   ASTSGOT 29   ALTSGPT 18   TBILIRUBIN  0.5   ALKPHOSPHAT 85   GLOBULIN 3.1         Imaging:  CT-ABDOMEN-PELVIS WITH  Narrative: 10/13/2023 2:04 AM    HISTORY/REASON FOR EXAM: Abdominal pain    TECHNIQUE/EXAM DESCRIPTION:   CT scan of the abdomen and pelvis with contrast.    Contrast-enhanced helical scanning was obtained from the diaphragmatic domes through the pubic symphysis following the bolus administration of nonionic contrast without complication.    100 mL of Omnipaque 350 nonionic contrast was administered without complication.    Low dose optimization technique was utilized for this CT exam including automated exposure control and adjustment of the mA and/or kV according to patient size.    COMPARISON: 10/7/2023    FINDINGS:  Lower Chest: Unremarkable    Liver: Normal    Gallbladder: No calcified stones    Biliary: Nondilated    Pancreas: Unremarkable    Spleen: Unremarkable    Adrenal glands: Normal    Kidneys: Unremarkable without hydronephrosis    Bowel: No obstruction or acute inflammation. There has been a colectomy. There is a LEFT lower quadrant ileostomy.    Lymph nodes: No adenopathy    Vasculature: Unremarkable    Peritoneum: Unremarkable without ascites    Musculoskeletal: No acute or destructive process    Pelvis: There is a small amount of pelvic fluid. There is a catheter in the urinary bladder  Impression: Prior colectomy with LEFT lower quadrant ileostomy  Catheter in place in the urinary bladder. The tip abuts the bladder dome.            Impressions:  68 years old female with medical history of Crohn's s/p resection of bowel with ileostomy, recurrent dysphagia, presented to GI inpatient service for abdominal pain and some upper GI symptoms.    Regarding upper GI, intermittent nausea, vomiting and slow swallowing, which are close to her baseline, however, in order to catch some acute illness, GI team will suggest bedside speech swallow eval again, if she passes the test, consider esophagram by radiology before we think about  invasive procedure, upper GI scope.    Regarding the diffuse, nonspecific, abdominal discomfort, based on the negative ESR, CRP, CAT scan, no bloody bowel movement or obstruction, chance of acute Crohn small bowel flare is not high.  Before we think about doing the ileoscopy, GI team will suggest fecal calprotectin first.    GI team will follow up on the esophagram and fecal calprotectin before we provide further endoscopy intervention.     Agree with current Supportive care, fluid support, pain management.    Inpatient GI team will continue to follow up periodically.       This note was generated using voice recognition software which has a small chance of producing errors of grammar and possibly content. I have made every reasonable attempt to find and correct any obvious errors, but expect that some may not be found prior to finalization of this note.

## 2023-10-14 NOTE — THERAPY
Speech Language Pathology   Clinical Swallow Evaluation     Patient Name: Jana Overton  AGE:  68 y.o., SEX:  female  Medical Record #: [id number]  Date of Service: 10/14/2023      History of Present Illness  68 y.o. female who presented on 10/12 with urinary retention and nausea. Found to have lactic acid acidosis.    Previously seen by SLP at [facility] 10/2022, found to have baseline dysphagia and recommended PU4/TN0 as most consistent with baseline diet.    PMHx: esophageal stricture with dilation, COPD, Crohn's disease s/p colectomy with ileostomy on left side, HTN, pneumonia.       General Information:  Vitals  O2 Delivery Device: None - Room Air  Level of Consciousness: Alert, Awake  Orientation: Oriented x 4  Follows Directives: Yes      Prior Living Situation & Level of Function:  Housing / Facility: 1 Story House  Lives with - Patient's Self Care Capacity: Spouse  Communication: WFL  Swallowing: Hx of esophageal dysphagia       Oral Mechanism Evaluation:  Dentition: Fair, Natural dentition, Multiple carries   Facial Symmetry: Equal  Facial Sensation: Equal     Labial Observations: WFL   Lingual Observations: Midline  Motor Speech: WFL         Laryngeal Function:  Secretion Management: Adequate  Voice Quality: WFL  Cough: Perceptually WNL         Subjective  RN cleared patient for clinical swallow evaluation, reported pt has refused taking oral pills or diet this date until she received a swallow evaluation. Pt received awake, alert, sitting edge of bed. Endorsed history of dysphagia. Reported she often has to puree foods at home, however can tolerate fish, noodles, and things with a lot of sauce. Agreeable to PO trials, however reported she is only able to take 1 bite/sip every 10 minutes to prevent emesis.       Assessment  Current Method of Nutrition: Oral diet (Regular solids/thin liquids)  Positioning: Sitting EOB  Bolus Administration: Patient  O2 Delivery Device: None - Room Air  Factor(s) Affecting  "Performance: None  Tracheostomy : No        Swallowing Trials:  Swallowing Trials  Thin Liquid (TN0): WFL  Liquidised (LQ3): WFL      Comments: Pt able to self-feed with no difficulty. Demo'd adequate oral bolus acceptance and containment. Pt independently incorporating chin tuck with each swallow, reported received strategy from previous speech therapy. Presumed complete AP transfer with no oral bolus residue. No cough/throat clear appreciated across trials. Vocal quality remained stable throughout PO intake. One-two swallows completed per bolus. Instance of pt having to expectorate yogurt drink, citing unable to get it down d/t warm temperature. Denied trials of solids in fear of emesis. Discussed option to downgrade diet to pureed solids or minced and moist solids as appears to be most consistent with baseline diet per pt report; however pt declined downgrade and would rather stay on a regular solid diet to be able to \"move my mouth\" and choose what she wants to eat. Denied globus sensation with trials this date. Pt independently incorporating strategies of small bites/sips, slow rate of intake, and sitting fully upright with PO. Did report being hesitant to eat d/t fear of emesis.       Clinical Impressions  Patient presentation consistent with baseline dysphagia. Per pt preference, recommend continue diet of regular solids with thin liquids; however consider diet downgrade with any difficulties to puree/thin liquids or minced and moist solids/thin liquids as most consistent with diet in discharge setting. No further acute speech therapy needs indicated at this junction. Please re-consult with any overt s/sx of aspiration or decline in respiratory status with oral intake.         Recommendations  Diet Consistency: Regular solids/thin liquids (Consider downgrade to puree/thin liquids as most consistent with baseline diet)  Instrumentation: None indicated at this time  Medication: As tolerated  Supervision: " Independent  Positioning: Fully upright and midline during oral intake, Remain upright for 30-45 minutes after oral intake, Meals sitting upright in a chair, as tolerated  Risk Management : Small bites/sips, Slow rate of intake, Alternate bites and sips  Oral Care: BID         SLP Treatment Plan  Treatment Plan: None Indicated  SLP Frequency: N/A - Evaluation Only  Estimated Duration: N/A - Evaluation Only      Anticipated Discharge Needs  Discharge Recommendations: Anticipate that the patient will have no further speech therapy needs after discharge from the hospital   Therapy Recommendations Upon DC: Not Indicated            Cathleen Mckeon, SLP

## 2023-10-14 NOTE — PROGRESS NOTES
"Pt called RN in via call light to tell this RN that her  is extremely dissatisfied because I \"blew him off twice.\" The patient alleges that her  has attempted to speak with this RN twice. This RN has not received any phone call regarding this patient from family.  The patient indicated he attempted to speak with me while the patient was on the toilet earlier in the shift. This RN witnessed the patient place her cell phone in her bag and say goodbye to whom ever she was speaking with.  "

## 2023-10-14 NOTE — PROGRESS NOTES
"At approximately 0615 responded to patient's bathroom assist alarm. Pt was seen sitting on the toilet stating she was having difficulty urinating and she was witnessed straining. Educated patient that she has a milton catheter and its purpose.  Patient refused teaching indicating that she believes it is \"fake urine\" and  it is not her urine, and she believes she was told the catheter was placed in her colon.  Patient was observed to have statlock placed by this RN misplaced and tied in a knot around the locking mechanism blocking patency. Re-educated patient on how statlock works and its proper placement.   "

## 2023-10-14 NOTE — WOUND TEAM
"  [facility]     Inpatient Services     Established Ostomy Management/ troubleshooting      Plan: Bedside RNs to assist pt with appliance changes. Ostomy RN to remain available PRN for any needs.    HPI: Reviewed  PMH: Reviewed   SH: Reviewed     Reason for Ostomy nurse consult:  Established ileostomy    Ostomy History: Patient has had ileostomy for 20+ years r/t Crohn's disease. She is completely independent with care, using her own supplies, which are a hospitals does not carry.       Ileostomy  LLQ (Active)   Wound Image   10/13/23 1725   Stomal Appliance Assessment Changed 10/13/23 1725   Stoma Assessment Beefy red 10/13/23 1725   Stoma Shape Budded Greater Than One Inch;Oval 10/13/23 1725   Stoma Size (in) 1 10/13/23 1725   Peristomal Assessment Clean;Dry;Intact 10/13/23 1725   Mucocutaneous Junction Intact 10/13/23 1725   Treatment Appliance Changed 10/13/23 1725   Peristomal Protectant No Sting Skin Prep;Paste Ring 10/13/23 1725   Output Color Brown 10/13/23 1725   WOUND NURSE ONLY - Time Spent with Patient (mins) 60 10/13/23 1725     Interventions and Education (if needed): Previous appliance removed using push pull method and adhesive remover spray. Stoma and peristomal skin cleansed with moist warm washcloth. No sting skin protectant applied to peristomal skin. Confirmed fit. Plastic backing removed and paper edges \"Dog Eared.\" Paste ring stretched to fit back of barrier and then applied to barrier. Barrier was applied down to skin and adhered with friction. Pouch then connected and pouch end closed.      Evaluation: Patient normally does ostomy change while in the bathroom, was not enough space to change in the bathroom of patient's current room, thus change was performed while patient was lying in bed.  Patient did majority of the change by herself, she had minor concerns over the scattered bumps on her ileostomy, stating they are usually only present on the bottom. Patient may " benefit from application of silver nitrate over bumps on an outpatient basis.  Output noted in previous bag.    Anticipated discharge needs: None

## 2023-10-14 NOTE — PROGRESS NOTES
"Charge RN called spouse.  - Spouse concerned about private restroom for patient, relayed they have had \"problems\" with travelers in the past. Spouse had concern about the \"doctors hiding\" from the patient.  Charge RN addressed all concerns over the phone w/ spouse.  "

## 2023-10-14 NOTE — PROGRESS NOTES
"Responded to patient's call light request. (Patient pressed button 5 times consecutively ) - visited patient's room, indicated she did not press the call light and that I was accusing her of being a liar. Clarified my responsibility to respond to her call light. She went on to accuse this RN of \"belittling\" her throughout the shift. Each interaction with this patient had been observed in one form or another by CNA, secondary RN's, and Charge RN.    Patient expressed desire to change RN's. Harris RN continuing primary care from 0520 until shift change. This RN secondary because of patient's continued verbal abuse and hostility.    "
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
## 2023-10-14 NOTE — PROGRESS NOTES
[facility] Medicine Daily Progress Note    Date of Service  10/14/2023    Chief Complaint  Jana Overton is a 68 y.o. female admitted 10/12/2023 with abdominal pain, nausea    Hospital Course  68 y.o. female who presented 10/12/2023  with urinary retention, nausea.  Patient has a history of Crohn's disease status post colectomy with ileostomy on left side, hypertension, chronic pain.  She has had 2 recent emergency room visits due to worsening of chronic abdominal pain and difficulty tolerating p.o. intake with failure of p.o. Zofran at home.  On her last ED visit she had a borderline FLORES with creatinine increased to 1.5 from baseline of 1.2.  She was discharged home after some IV fluids.  She presents again due to concern for decreased urinary output.  She had a Sage placed at urology office for unclear reason however did not drain much urine.  Kidney function stable from last week however did have elevated lactic acid to 3.9.  After some IV fluids she is making some dark urine.  Due to concern for elevated lactic acid and inability to tolerate p.o. intake admission was requested.  CT abdomen unremarkable  Lactic acid 3.9 on admission, which has been resolved  ESR, CRP negative    Interval Problem Update  Seen patient at bedside  Reports abd pain, nausea, vomiting. Unable to take oral pills and oral diet  States she will have vomiting and abdominal pain with po pills  I have consulted and discussed with GI Dr. Davalos.   Significant abdominal pain and muscle spasm, only able to take iv. On iv dilaudid and iv valium. High risk for overdose. Closely monitoring sedation score and respiratory status  Discussed with GI, rec speech evaluation. I have ordered speech evaluation.   I ordered calprotectin  K 3.2, phos 1.7 ordered iv replaced  Mag 1.4 ordered iv mag sulfate    I have discussed this patient's plan of care and discharge plan at IDT rounds today with Case Management, Nursing, Nursing leadership, and other members  of the IDT team.    Consultants/Specialty  GI    Code Status  Full Code    Disposition  The patient is not medically cleared for discharge to home or a post-acute facility.      I have placed the appropriate orders for post-discharge needs.    Review of Systems  Review of Systems   Gastrointestinal:  Positive for abdominal pain, nausea and vomiting.        Physical Exam  Temp:  [36.4 °C (97.6 °F)-36.7 °C (98 °F)] 36.4 °C (97.6 °F)  Pulse:  [] 97  Resp:  [16-19] 16  BP: (120-144)/(56-90) 125/56  SpO2:  [93 %-97 %] 97 %    Physical Exam  Vitals and nursing note reviewed.   Constitutional:       Appearance: Normal appearance. She is ill-appearing.   HENT:      Head: Normocephalic and atraumatic.      Nose: Nose normal.      Mouth/Throat:      Pharynx: Oropharynx is clear.   Eyes:      Extraocular Movements: Extraocular movements intact.      Conjunctiva/sclera: Conjunctivae normal.      Pupils: Pupils are equal, round, and reactive to light.   Cardiovascular:      Rate and Rhythm: Normal rate and regular rhythm.      Pulses: Normal pulses.      Heart sounds: Normal heart sounds.   Pulmonary:      Effort: Pulmonary effort is normal.      Breath sounds: Normal breath sounds.   Abdominal:      General: Abdomen is flat. Bowel sounds are normal.      Palpations: Abdomen is soft.      Tenderness: There is abdominal tenderness.      Comments: Colostomy bag in place   Musculoskeletal:         General: Normal range of motion.      Cervical back: Normal range of motion and neck supple.   Skin:     General: Skin is warm and dry.   Neurological:      General: No focal deficit present.      Mental Status: She is alert and oriented to person, place, and time. Mental status is at baseline.   Psychiatric:         Mood and Affect: Mood normal.         Behavior: Behavior normal.         Fluids    Intake/Output Summary (Last 24 hours) at 10/14/2023 1429  Last data filed at 10/14/2023 0400  Gross per 24 hour   Intake 710 ml   Output  706 ml   Net 4 ml       Laboratory  Recent Labs     10/12/23  2258 10/14/23  1013   WBC 10.3 7.8   RBC 4.76 4.26   HEMOGLOBIN 14.7 13.0   HEMATOCRIT 43.8 38.7   MCV 92.0 90.8   MCH 30.9 30.5   MCHC 33.6 33.6   RDW 40.9 41.2   PLATELETCT 210 148*   MPV 10.6 10.4     Recent Labs     10/12/23  2258 10/14/23  0712   SODIUM 142 140   POTASSIUM 3.9 3.2*   CHLORIDE 104 105   CO2 22 24   GLUCOSE 97 104*   BUN 23* 14   CREATININE 1.52* 1.09   CALCIUM 10.5 9.4                   Imaging  CT-ABDOMEN-PELVIS WITH   Final Result      Prior colectomy with LEFT lower quadrant ileostomy   Catheter in place in the urinary bladder. The tip abuts the bladder dome.              Assessment/Plan  * Lactic acid acidosis- (present on admission)  Assessment & Plan  3.9 on presentation  In setting of dehydration, nausea/vomiting  IV fluid resuscitation  Trend lactic acid, have ordered repeat  Patient received IV fluid, lactic acidosis resolved    Protein calorie malnutrition (HCC)  Assessment & Plan  Severe abdomina pain, nausea and vomiting, unable to oral diet for 1 to 2 weeks  I have ordered boost  Dietitian consult    Abdominal pain  Assessment & Plan  Diffuse abdominal pain, nausea and vomiting in the setting of Crohn disease, status post colectomy  CT abdomen unremarkable for acute pathology.  ESR/CRP negative.  Denies bloody stool  Unclear etiology  Unable to take p.o. medications and diet. Requested iv narcotics  GI rec caloproectin and speech evaluation, which are ordered  Multimodal pain managements including po and iv narcotics prn. Monitoring respiratory status and sedation score      Essential hypertension- (present on admission)  Assessment & Plan  Continue metoprolol, holding diuretics in setting of dehydration    Tachycardia  Assessment & Plan  Has been persistently in 110's  In setting of dehydration  Have ordered twelve-lead EKG to further assess      Dehydration  Assessment & Plan  With decreased urine output as well as  decreased ostomy output  In setting of nausea and vomiting  IV fluids, monitor BMP and urine output, has started to make dark urine after initial IV fluid resuscitation  Antiemetics    Crohn's disease of small intestine with complication (HCC)- (present on admission)  Assessment & Plan  Has ostomy in place.  Follows with Dr. Davalos per patient  Recently completed Medrol Dosepak, will hold off on further steroids as unclear that there is current Crohn's flare given no ostomy output or findings of inflammation on CT abdomen  Check inflammatory markers ESR, CRP negative  No bloody bowel movement or obstruction, chance of acute Crohn small bowel flare is not high.   Ordered calprotectin  Consulted GI, discussed with GI         VTE prophylaxis:    heparin ppx      I have performed a physical exam and reviewed and updated ROS and Plan today (10/14/2023). In review of yesterday's note (10/13/2023), there are no changes except as documented above.    Patient is has a high medical complexity, complex decision making and is at high risk for complication, morbidity, and mortality.    My total time spent caring for the patient on the day of the encounter was 52  minutes.   This does not include time spent on separately billable procedures/tests.

## 2023-10-15 ENCOUNTER — APPOINTMENT (OUTPATIENT)
Dept: RADIOLOGY | Facility: MEDICAL CENTER | Age: 68
DRG: 699 | End: 2023-10-15
Attending: STUDENT IN AN ORGANIZED HEALTH CARE EDUCATION/TRAINING PROGRAM
Payer: MEDICARE

## 2023-10-15 PROBLEM — E83.39 HYPOPHOSPHATEMIA: Status: ACTIVE | Noted: 2023-10-15

## 2023-10-15 LAB
ANION GAP SERPL CALC-SCNC: 15 MMOL/L (ref 7–16)
BUN SERPL-MCNC: 12 MG/DL (ref 8–22)
CALCIUM SERPL-MCNC: 9.5 MG/DL (ref 8.5–10.5)
CHLORIDE SERPL-SCNC: 102 MMOL/L (ref 96–112)
CO2 SERPL-SCNC: 22 MMOL/L (ref 20–33)
CREAT SERPL-MCNC: 1.29 MG/DL (ref 0.5–1.4)
ERYTHROCYTE [DISTWIDTH] IN BLOOD BY AUTOMATED COUNT: 43.2 FL (ref 35.9–50)
GFR SERPLBLD CREATININE-BSD FMLA CKD-EPI: 45 ML/MIN/1.73 M 2
GLUCOSE SERPL-MCNC: 115 MG/DL (ref 65–99)
HCT VFR BLD AUTO: 39.5 % (ref 37–47)
HGB BLD-MCNC: 13 G/DL (ref 12–16)
MAGNESIUM SERPL-MCNC: 1.8 MG/DL (ref 1.5–2.5)
MCH RBC QN AUTO: 30.7 PG (ref 27–33)
MCHC RBC AUTO-ENTMCNC: 32.9 G/DL (ref 32.2–35.5)
MCV RBC AUTO: 93.2 FL (ref 81.4–97.8)
PHOSPHATE SERPL-MCNC: 3.3 MG/DL (ref 2.5–4.5)
PLATELET # BLD AUTO: 177 K/UL (ref 164–446)
PMV BLD AUTO: 11 FL (ref 9–12.9)
POTASSIUM SERPL-SCNC: 3.1 MMOL/L (ref 3.6–5.5)
RBC # BLD AUTO: 4.24 M/UL (ref 4.2–5.4)
SODIUM SERPL-SCNC: 139 MMOL/L (ref 135–145)
WBC # BLD AUTO: 8.2 K/UL (ref 4.8–10.8)

## 2023-10-15 PROCEDURE — A9270 NON-COVERED ITEM OR SERVICE: HCPCS | Performed by: STUDENT IN AN ORGANIZED HEALTH CARE EDUCATION/TRAINING PROGRAM

## 2023-10-15 PROCEDURE — 36415 COLL VENOUS BLD VENIPUNCTURE: CPT

## 2023-10-15 PROCEDURE — 85027 COMPLETE CBC AUTOMATED: CPT

## 2023-10-15 PROCEDURE — G0378 HOSPITAL OBSERVATION PER HR: HCPCS

## 2023-10-15 PROCEDURE — 83735 ASSAY OF MAGNESIUM: CPT

## 2023-10-15 PROCEDURE — 97162 PT EVAL MOD COMPLEX 30 MIN: CPT

## 2023-10-15 PROCEDURE — 700111 HCHG RX REV CODE 636 W/ 250 OVERRIDE (IP): Performed by: STUDENT IN AN ORGANIZED HEALTH CARE EDUCATION/TRAINING PROGRAM

## 2023-10-15 PROCEDURE — 51798 US URINE CAPACITY MEASURE: CPT

## 2023-10-15 PROCEDURE — 74220 X-RAY XM ESOPHAGUS 1CNTRST: CPT

## 2023-10-15 PROCEDURE — 97535 SELF CARE MNGMENT TRAINING: CPT

## 2023-10-15 PROCEDURE — 84100 ASSAY OF PHOSPHORUS: CPT

## 2023-10-15 PROCEDURE — 99233 SBSQ HOSP IP/OBS HIGH 50: CPT | Performed by: STUDENT IN AN ORGANIZED HEALTH CARE EDUCATION/TRAINING PROGRAM

## 2023-10-15 PROCEDURE — 80048 BASIC METABOLIC PNL TOTAL CA: CPT

## 2023-10-15 PROCEDURE — 700102 HCHG RX REV CODE 250 W/ 637 OVERRIDE(OP): Performed by: STUDENT IN AN ORGANIZED HEALTH CARE EDUCATION/TRAINING PROGRAM

## 2023-10-15 PROCEDURE — 700111 HCHG RX REV CODE 636 W/ 250 OVERRIDE (IP): Mod: JZ | Performed by: STUDENT IN AN ORGANIZED HEALTH CARE EDUCATION/TRAINING PROGRAM

## 2023-10-15 RX ORDER — POTASSIUM CHLORIDE 7.45 MG/ML
10 INJECTION INTRAVENOUS
Status: DISPENSED | OUTPATIENT
Start: 2023-10-15 | End: 2023-10-15

## 2023-10-15 RX ADMIN — POTASSIUM CHLORIDE 10 MEQ: 7.46 INJECTION, SOLUTION INTRAVENOUS at 16:54

## 2023-10-15 RX ADMIN — ONDANSETRON 4 MG: 2 INJECTION INTRAMUSCULAR; INTRAVENOUS at 16:25

## 2023-10-15 RX ADMIN — ONDANSETRON 4 MG: 2 INJECTION INTRAMUSCULAR; INTRAVENOUS at 20:00

## 2023-10-15 RX ADMIN — POTASSIUM CHLORIDE 10 MEQ: 7.46 INJECTION, SOLUTION INTRAVENOUS at 08:56

## 2023-10-15 RX ADMIN — NYSTATIN 500000 UNITS: 100000 SUSPENSION ORAL at 23:43

## 2023-10-15 RX ADMIN — ONDANSETRON 4 MG: 2 INJECTION INTRAMUSCULAR; INTRAVENOUS at 01:27

## 2023-10-15 RX ADMIN — HYDROMORPHONE HYDROCHLORIDE 1 MG: 1 INJECTION, SOLUTION INTRAMUSCULAR; INTRAVENOUS; SUBCUTANEOUS at 20:00

## 2023-10-15 RX ADMIN — HYDROMORPHONE HYDROCHLORIDE 1 MG: 1 INJECTION, SOLUTION INTRAMUSCULAR; INTRAVENOUS; SUBCUTANEOUS at 14:28

## 2023-10-15 RX ADMIN — HYDROMORPHONE HYDROCHLORIDE 1 MG: 1 INJECTION, SOLUTION INTRAMUSCULAR; INTRAVENOUS; SUBCUTANEOUS at 01:27

## 2023-10-15 RX ADMIN — POTASSIUM CHLORIDE 10 MEQ: 7.46 INJECTION, SOLUTION INTRAVENOUS at 15:42

## 2023-10-15 RX ADMIN — POTASSIUM CHLORIDE 10 MEQ: 7.46 INJECTION, SOLUTION INTRAVENOUS at 18:39

## 2023-10-15 RX ADMIN — DIAZEPAM 5 MG: 5 INJECTION, SOLUTION INTRAMUSCULAR; INTRAVENOUS at 23:31

## 2023-10-15 RX ADMIN — HYDROMORPHONE HYDROCHLORIDE 1 MG: 1 INJECTION, SOLUTION INTRAMUSCULAR; INTRAVENOUS; SUBCUTANEOUS at 06:01

## 2023-10-15 RX ADMIN — DIAZEPAM 5 MG: 5 INJECTION, SOLUTION INTRAMUSCULAR; INTRAVENOUS at 16:25

## 2023-10-15 RX ADMIN — HYDROMORPHONE HYDROCHLORIDE 1 MG: 1 INJECTION, SOLUTION INTRAMUSCULAR; INTRAVENOUS; SUBCUTANEOUS at 14:41

## 2023-10-15 ASSESSMENT — COGNITIVE AND FUNCTIONAL STATUS - GENERAL
MOBILITY SCORE: 24
SUGGESTED CMS G CODE MODIFIER MOBILITY: CH

## 2023-10-15 ASSESSMENT — ENCOUNTER SYMPTOMS
VOMITING: 1
ABDOMINAL PAIN: 1
NAUSEA: 1

## 2023-10-15 ASSESSMENT — GAIT ASSESSMENTS
GAIT LEVEL OF ASSIST: SUPERVISED
DISTANCE (FEET): 400
ASSISTIVE DEVICE: OTHER (COMMENTS)
DEVIATION: BRADYKINETIC

## 2023-10-15 ASSESSMENT — PAIN DESCRIPTION - PAIN TYPE
TYPE: CHRONIC PAIN

## 2023-10-15 ASSESSMENT — FIBROSIS 4 INDEX: FIB4 SCORE: 2.96

## 2023-10-15 NOTE — THERAPY
Physical Therapy   Initial Evaluation     Patient Name: Jana Overton  Age:  68 y.o., Sex:  female  Medical Record #: [id number]  Today's Date: 10/15/2023     Precautions  Precautions: Fall Risk    Assessment  Patient is 68 y.o. female who presented 10/12/2023  with urinary retention, nausea.   Currently been managed for acidosis, protein calorie malnutrition, diffuse abdominal pain, tachycardia, dehydration.   PMH: Crohn's disease s/p colectomy with ileostomy, HTN, chronic pain, anxiety, arthritis, asthma, A Fib/flutter, COPD, post-herpetic neuralgia.     Patient seen for PT evaluation. Patient demonstrated mobility tasks as detailed below. She appears to be close to her baseline for functional mobility. Does not need any further acute PT services. Recommending out-patient PT for her low back concerns.     RECOMMEND OOB TO CHAIR FOR MEALS AND AMBULATE IN THE HALLWAY MULTIPLE TIMES DURING THE DAY.     Plan    Physical Therapy Initial Treatment Plan   Duration: Evaluation only    DC Equipment Recommendations: None  Discharge Recommendations: Recommend outpatient physical therapy services to address higher level deficits      Objective       10/15/23 0841   Charge Group   PT Evaluation PT Evaluation Mod   PT Self Care / Home Evaluation (Units) 1   Total Time Spent   PT Evaluation Time Spent (Mins) 14   PT Self Care/Home Evaluation Time Spent (Mins) 13   PT Total Time Spent (Calculated) 27   Initial Contact Note    Initial Contact Note Order Received and Verified, Physical Therapy Evaluation in Progress with Full Report to Follow.   Precautions   Precautions Fall Risk   Vitals   Pulse (!) 113   Patient BP Position Sitting  (Edge of bed, post activity)   Blood Pressure  139/87   Pulse Oximetry 94 %   O2 Delivery Device None - Room Air   Pain   Pain Scales 0 to 10 Scale    Intervention Medication (see MAR)   Pain 0 - 10 Group   Location Generalized   Therapist Pain Assessment Prior to Activity;During Activity;Post  Activity  (Reports mild pain all over, chronic back pain)   Prior Living Situation   Prior Services Home-Independent   Housing / Facility 1 Story House   Steps Into Home 2   Steps In Home 2   Rail None   Equipment Owned Single Point Cane;Front-Wheel Walker   Lives with - Patient's Self Care Capacity Spouse   Comments Patient's spouse is a contractor, works full time. He will be gone for sometime next week. Patient sleeps in a recliner.   Prior Level of Functional Mobility   Bed Mobility Independent   Transfer Status Independent   Ambulation Independent   Ambulation Distance Community   Assistive Devices Used Single Point Cane  (Uses PRN)   Stairs Independent   Comments Patient mentioned that she was following up with out-patient PT and recently had to stop since her PT left. She was being treated for her low back & lymphedema.   History of Falls   History of Falls No   Cognition    Level of Consciousness Alert   Passive ROM Upper Body   Comments Please refer to OT notes for upper body assessment   Passive ROM Lower Body   Passive ROM Lower Body WDL   Active ROM Lower Body    Active ROM Lower Body  WDL   Strength Lower Body   Lower Body Strength  WDL   Other Treatments   Other Treatments Provided Educated patient about PT's role in acute care since patient was requesting continued PT services and requested complete PT evaluation. Discussed recommendations of out-patient PT for her low back pain. Reinforced OOB to chair for meals. Discussed about LE ex's (hip flexion, long arc quads and ankle pumps) to performed in sitting.   Balance Assessment   Sitting Balance (Static) Good   Sitting Balance (Dynamic) Fair +   Standing Balance (Static) Fair +   Standing Balance (Dynamic) Fair   Comments With and without AD in standing   Bed Mobility    Comments Already OOB upon PT's arrival; per patient, she is independent with bed mobility with HOB raised   Gait Analysis   Gait Level Of Assist Supervised   Assistive Device Other  (Comments)  (IV pole)   Distance (Feet) 400   Deviation Bradykinetic   Comments Patient ambulated in the hallway without loss of balance. Encouraged to continue ambulation as able.   Functional Mobility   Sit to Stand Supervised   Bed, Chair, Wheelchair Transfer Refused   How much difficulty does the patient currently have...   Turning over in bed (including adjusting bedclothes, sheets and blankets)? 4   Sitting down on and standing up from a chair with arms (e.g., wheelchair, bedside commode, etc.) 4   Moving from lying on back to sitting on the side of the bed? 4   How much help from another person does the patient currently need...   Moving to and from a bed to a chair (including a wheelchair)? 4   Need to walk in a hospital room? 4   Climbing 3-5 steps with a railing? 4   6 clicks Mobility Score 24   Activity Tolerance   Sitting Edge of Bed Post session   Patient / Family Goals    Patient / Family Goal #1 To return home   Education Group   Education Provided Role of Physical Therapist   Role of Physical Therapist Patient Response Patient;Acceptance;Explanation;Demonstration;Verbal Demonstration;Action Demonstration   Physical Therapy Initial Treatment Plan    Duration Evaluation only   Anticipated Discharge Equipment and Recommendations   DC Equipment Recommendations None   Discharge Recommendations Recommend outpatient physical therapy services to address higher level deficits   Interdisciplinary Plan of Care Collaboration   IDT Collaboration with  Nursing   Patient Position at End of Therapy Seated;Edge of Bed;Call Light within Reach;Tray Table within Reach   Session Information   Date / Session Number  10/15-Eval only

## 2023-10-15 NOTE — PROGRESS NOTES
"Pt. In family room, with personal belongings, phone and tablet set up.  Pt came into hallway, tearful, stating \"this room is dirty, the black mold in here is giving me an asthma attack, the jose shue is off, I reorganized the room for better jose shue.\"  \"I know I talk too much, when your brain is not fed you talk to much, I know I am not bothering my roommate, we are friends.\"  \"I am just going to sit in my room and not use any electronics, as soon as the Dr. Davalos is done with my procedure I am going to leave, even if my clothes are dirty I am just going to leave.\"  \"My  is going to call for updates.\"   Utilized active listening, provided reassurance, gave kleenex, added bed extender to bed.   "

## 2023-10-15 NOTE — THERAPY
Physical Therapy Contact Note    Patient Name: Jana Overton  Age:  68 y.o., Sex:  female  Medical Record #: [id number]  Today's Date: 10/14/2023     PT consult received, per chart review has been up self ambulating around RN station and in restroom by self several times when discussing with BSRN; anticipate no need for acute PT evaluation; with speech upon attempt to clarify will return when able/if needed;    Nidia LEMONS, PT,  [phone]

## 2023-10-15 NOTE — PROGRESS NOTES
Hospital Medicine Daily Progress Note    Date of Service  10/15/2023    Chief Complaint  Jana Overton is a 68 y.o. female admitted 10/12/2023 with abdominal pain, nausea    Hospital Course  68 y.o. female who presented 10/12/2023  with urinary retention, nausea.  Patient has a history of Crohn's disease status post colectomy with ileostomy on left side, hypertension, chronic pain.  She has had 2 recent emergency room visits due to worsening of chronic abdominal pain and difficulty tolerating p.o. intake with failure of p.o. Zofran at home.  On her last ED visit she had a borderline FLORES with creatinine increased to 1.5 from baseline of 1.2.  She was discharged home after some IV fluids.  She presents again due to concern for decreased urinary output.  She had a Sage placed at urology office for unclear reason however did not drain much urine.  Kidney function stable from last week however did have elevated lactic acid to 3.9.  After some IV fluids she is making some dark urine.  Due to concern for elevated lactic acid and inability to tolerate p.o. intake admission was requested.  CT abdomen unremarkable  Lactic acid 3.9 on admission, which has been resolved  ESR, CRP negative    Interval Problem Update  Seen patient at bedside  Patient reports abdominal pain, nausea and vomiting  Refuse to take po meds  She is able to take some of po diet  Pending esophagram   K 3.1, refused po k supplements  I ordered iv kcl  Peripheral iv infiltrated. She requests vascular assess team to place a line.   Labs reviewed   Erratic and tangential thought process. I have consulted psychiatry     I have discussed this patient's plan of care and discharge plan at IDT rounds today with Case Management, Nursing, Nursing leadership, and other members of the IDT team.    Consultants/Specialty  GI    Code Status  Full Code    Disposition  The patient is not medically cleared for discharge to home or a post-acute facility.      I have  placed the appropriate orders for post-discharge needs.    Review of Systems  Review of Systems   Gastrointestinal:  Positive for abdominal pain, nausea and vomiting.        Physical Exam  Temp:  [35.9 °C (96.7 °F)-36.7 °C (98 °F)] 35.9 °C (96.7 °F)  Pulse:  [100-112] 112  Resp:  [18] 18  BP: (124-141)/(56-87) 139/87  SpO2:  [93 %-99 %] 94 %    Physical Exam  Vitals and nursing note reviewed.   Constitutional:       Appearance: Normal appearance. She is ill-appearing.   HENT:      Head: Normocephalic and atraumatic.      Nose: Nose normal.      Mouth/Throat:      Pharynx: Oropharynx is clear.   Eyes:      Extraocular Movements: Extraocular movements intact.      Conjunctiva/sclera: Conjunctivae normal.      Pupils: Pupils are equal, round, and reactive to light.   Cardiovascular:      Rate and Rhythm: Normal rate and regular rhythm.      Pulses: Normal pulses.      Heart sounds: Normal heart sounds.   Pulmonary:      Effort: Pulmonary effort is normal.      Breath sounds: Normal breath sounds.   Abdominal:      General: Abdomen is flat. Bowel sounds are normal.      Palpations: Abdomen is soft.      Tenderness: There is abdominal tenderness.      Comments: Colostomy bag in place   Musculoskeletal:         General: Normal range of motion.      Cervical back: Normal range of motion and neck supple.   Skin:     General: Skin is warm and dry.   Neurological:      General: No focal deficit present.      Mental Status: She is alert and oriented to person, place, and time. Mental status is at baseline.   Psychiatric:      Comments: Anxious  Tangential thought process         Fluids    Intake/Output Summary (Last 24 hours) at 10/15/2023 1418  Last data filed at 10/15/2023 1000  Gross per 24 hour   Intake 236 ml   Output --   Net 236 ml         Laboratory  Recent Labs     10/12/23  2258 10/14/23  1013 10/15/23  0530   WBC 10.3 7.8 8.2   RBC 4.76 4.26 4.24   HEMOGLOBIN 14.7 13.0 13.0   HEMATOCRIT 43.8 38.7 39.5   MCV 92.0  90.8 93.2   MCH 30.9 30.5 30.7   MCHC 33.6 33.6 32.9   RDW 40.9 41.2 43.2   PLATELETCT 210 148* 177   MPV 10.6 10.4 11.0       Recent Labs     10/12/23  2258 10/14/23  0712 10/15/23  0530   SODIUM 142 140 139   POTASSIUM 3.9 3.2* 3.1*   CHLORIDE 104 105 102   CO2 22 24 22   GLUCOSE 97 104* 115*   BUN 23* 14 12   CREATININE 1.52* 1.09 1.29   CALCIUM 10.5 9.4 9.5                     Imaging  CT-ABDOMEN-PELVIS WITH   Final Result      Prior colectomy with LEFT lower quadrant ileostomy   Catheter in place in the urinary bladder. The tip abuts the bladder dome.         DX-ESOPHAGUS BARIUM SWALLOW SINGLE CONTRAST    (Results Pending)   IR-US GUIDED PIV    (Results Pending)   IR-US GUIDED PIV    (Results Pending)        Assessment/Plan  * Lactic acid acidosis- (present on admission)  Assessment & Plan  3.9 on presentation  In setting of dehydration, nausea/vomiting  IV fluid resuscitation  Trend lactic acid, have ordered repeat  Patient received IV fluid, lactic acidosis resolved    Hypophosphatemia  Assessment & Plan  Replaced as indicated with iv kphos    Protein calorie malnutrition (HCC)  Assessment & Plan  Severe abdomina pain, nausea and vomiting, unable to oral diet for 1 to 2 weeks  I have ordered boost  Dietitian consult    Abdominal pain  Assessment & Plan  Diffuse abdominal pain, nausea and vomiting in the setting of Crohn disease, status post colectomy  CT abdomen unremarkable for acute pathology.  ESR/CRP negative.  Denies bloody stool  Unclear etiology  Unable to take p.o. medications and diet. Requested iv narcotics  GI rec caloproectin and speech evaluation, which are ordered  Multimodal pain managements including po and iv narcotics prn. Monitoring respiratory status and sedation score  1015: Patient reports abdominal pain, nausea and vomiting  Refuse to take po meds  She is able to take some of po diet  Pending esophagram       Hypomagnesemia- (present on admission)  Assessment & Plan  Replaced with iv  mag      Essential hypertension- (present on admission)  Assessment & Plan  Continue metoprolol, holding diuretics in setting of dehydration    Tachycardia  Assessment & Plan  Has been persistently in 110's  In setting of dehydration  Have ordered twelve-lead EKG to further assess      Hypokalemia  Assessment & Plan  Refused po supplements  I have ordered iv kcl replacement    Dehydration  Assessment & Plan  With decreased urine output as well as decreased ostomy output  In setting of nausea and vomiting  IV fluids, monitor BMP and urine output, has started to make dark urine after initial IV fluid resuscitation  Antiemetics    Crohn's disease of small intestine with complication (HCC)- (present on admission)  Assessment & Plan  Has ostomy in place.  Follows with Dr. Davalos per patient  Recently completed Medrol Dosepak, will hold off on further steroids as unclear that there is current Crohn's flare given no ostomy output or findings of inflammation on CT abdomen  Check inflammatory markers ESR, CRP negative  No bloody bowel movement or obstruction, chance of acute Crohn small bowel flare is not high.   Ordered calprotectin  Consulted GI, discussed with GI         VTE prophylaxis:    heparin ppx      I have performed a physical exam and reviewed and updated ROS and Plan today (10/15/2023). In review of yesterday's note (10/14/2023), there are no changes except as documented above.    Patient is has a high medical complexity, complex decision making and is at high risk for complication, morbidity, and mortality.    My total time spent caring for the patient on the day of the encounter was 53  minutes.   This does not include time spent on separately billable procedures/tests.

## 2023-10-15 NOTE — CARE PLAN
The patient is Stable - Low risk of patient condition declining or worsening    Shift Goals  Clinical Goals: K+ replace, esophagous imaging  Patient Goals: madina  Family Goals: madina    Progress made toward(s) clinical / shift goals:    Problem: Psychosocial  Goal: Patient's ability to re-evaluate and adapt role responsibilities will improve  Description: Target End Date:  Prior to discharge or change in level of care    1.  Assess family support  2.  Encourage support system participation in care  3.  Encouraged verbalization of feelings regarding caregiver responsibilities  4.  Discuss changes in role and responsibilities caused by patient's condition  Outcome: Not Progressing       Patient is not progressing towards the following goals:      Problem: Psychosocial  Goal: Patient's ability to re-evaluate and adapt role responsibilities will improve  Description: Target End Date:  Prior to discharge or change in level of care    1.  Assess family support  2.  Encourage support system participation in care  3.  Encouraged verbalization of feelings regarding caregiver responsibilities  4.  Discuss changes in role and responsibilities caused by patient's condition  Outcome: Not Progressing

## 2023-10-15 NOTE — DIETARY
"Nutrition services: Day 0 of admit.  Jana Overton is a 68 y.o. female with admitting DX of Lactic acid acidosis.  Consult received for FTT    RD met with pt at bedside. Pt states she has not been able to eat for 1-2 weeks due to N/V and inability to obtain food preferences during hospitalization. Pt reports consuming only organic / natural food products and her diet at home usually consists of wild game meat, fresh caught fish, and pureed vegetables / fruits from her garden. RD able to work with pt to provide food items available in the hospital and collaborated with nutrition representative to formulate diet plan. Throughout interview pt displayed signs of parnoia stating she is worried about \"snipers on the roof by schools\" and pt required frequent cuing to stay on topic when answering questions. Pt states that the hospital has been mistreating her and not providing healthy foods because \"[facility] is broke and most people don't know they went bankrupt\". RD able to provide pt with a Naked juice smoothie which she was grateful but asked that I wash the outside before handing it to her. Pt states she will watch the clock because she is \"only supposed to drink every 10 minutes\". RD requested a phychiatric consult from attending MD.   RD able to obtain permission to conduct a Nutrition Focused Physical Exam in which no signs of significant subcutaneous fat loss or muscle wasting noted.       Assessment:  Height: 182.9 cm (6')  Weight: 68.9 kg (151 lb 14.4 oz) via stand up scale   Body mass index is 20.6 kg/m²., BMI classification: normal   Diet/Intake: Cardiac, PO intake <25% x1 meal    Evaluation:   Labs: reviewed   Medications: reviewed, it appears pt has been refusing most medications per MAR  GI: emesis on 10/13 per flowsheet   Fluid accumulation: RD noted edema of bilateral LE   Wt hx: wt appears to have trended up, fluid shifts likely a contributing factor   Wt Readings from Last 10 Encounters:   10/15/23 68.9 " kg (151 lb 14.4 oz)   10/07/23 66.2 kg (146 lb)   10/05/23 66.2 kg (146 lb)   10/02/23 67 kg (147 lb 11.3 oz)   08/22/23 67.1 kg (148 lb)   08/17/23 69.6 kg (153 lb 7 oz)   05/02/23 66.7 kg (147 lb)   04/03/23 65.8 kg (145 lb)   03/16/23 66.2 kg (146 lb)   03/01/23 65.3 kg (144 lb)       Malnutrition Risk: Pt does not meet ASPEN criteria for malnutrition at this time       Problem: Nutritional:  Goal: Achieve adequate nutritional intake  Description: Patient will consume 50% of meals  Outcome: goal not met      Recommendations/Plan:  Continue Current diet order, Cardiac  Addition of food preferences and modifications added to [employer] in collaboration with nutrition representative    Addition of ONS, Magic Cup QD per pt request   Encourage intake of meals/ONS  Document intake of all meals/ONS  as % taken in ADL's to provide interdisciplinary communication across all shifts.   Achieve PO intake >50%   Place Psych consult, MD notified   Monitor weight.  Nutrition rep will continue to see patient for ongoing meal and snack preferences.     RD following

## 2023-10-15 NOTE — PROGRESS NOTES
"09:14 Patient complaining of 10/10 pain. PO potassium low pt refused po meds claiming \" i cant swallow pills\" despite successful swallow trial. This RN then went to give IV pain meds with doctor at beside per MAR.    14:30 Patient still refusing all po meds stating she cant swallow. Speech Pathologist at beside and patient refusing to change diet.     15:00 IV potassium requested and ordered in afternoon. Partially administered due to patient self removal. Patient stated \"I told you to get vascular here this morning and only they can do it according to them\". Patient then went to the bathroom and when RN was called back, IV was removed, patent and infusing prior to self removal. MD notified, midline ordered, to be placed r/t availability of VAT team. NOC Charge notified patient will need USIV in the mean time for pain meds.      "

## 2023-10-16 ENCOUNTER — APPOINTMENT (OUTPATIENT)
Dept: RADIOLOGY | Facility: MEDICAL CENTER | Age: 68
DRG: 699 | End: 2023-10-16
Attending: STUDENT IN AN ORGANIZED HEALTH CARE EDUCATION/TRAINING PROGRAM
Payer: MEDICARE

## 2023-10-16 PROBLEM — R41.0 DELIRIUM: Status: ACTIVE | Noted: 2023-10-16

## 2023-10-16 PROCEDURE — 99222 1ST HOSP IP/OBS MODERATE 55: CPT | Mod: GC | Performed by: STUDENT IN AN ORGANIZED HEALTH CARE EDUCATION/TRAINING PROGRAM

## 2023-10-16 PROCEDURE — 99232 SBSQ HOSP IP/OBS MODERATE 35: CPT | Performed by: NURSE PRACTITIONER

## 2023-10-16 PROCEDURE — G0378 HOSPITAL OBSERVATION PER HR: HCPCS

## 2023-10-16 PROCEDURE — 700111 HCHG RX REV CODE 636 W/ 250 OVERRIDE (IP): Mod: JZ | Performed by: STUDENT IN AN ORGANIZED HEALTH CARE EDUCATION/TRAINING PROGRAM

## 2023-10-16 PROCEDURE — 99233 SBSQ HOSP IP/OBS HIGH 50: CPT | Performed by: STUDENT IN AN ORGANIZED HEALTH CARE EDUCATION/TRAINING PROGRAM

## 2023-10-16 PROCEDURE — 700111 HCHG RX REV CODE 636 W/ 250 OVERRIDE (IP): Performed by: STUDENT IN AN ORGANIZED HEALTH CARE EDUCATION/TRAINING PROGRAM

## 2023-10-16 PROCEDURE — A9270 NON-COVERED ITEM OR SERVICE: HCPCS | Performed by: STUDENT IN AN ORGANIZED HEALTH CARE EDUCATION/TRAINING PROGRAM

## 2023-10-16 PROCEDURE — 700102 HCHG RX REV CODE 250 W/ 637 OVERRIDE(OP): Performed by: STUDENT IN AN ORGANIZED HEALTH CARE EDUCATION/TRAINING PROGRAM

## 2023-10-16 RX ORDER — FUROSEMIDE 10 MG/ML
20 INJECTION INTRAMUSCULAR; INTRAVENOUS ONCE
Status: COMPLETED | OUTPATIENT
Start: 2023-10-16 | End: 2023-10-16

## 2023-10-16 RX ORDER — DIAZEPAM 5 MG/ML
5 INJECTION, SOLUTION INTRAMUSCULAR; INTRAVENOUS EVERY 12 HOURS PRN
Status: DISCONTINUED | OUTPATIENT
Start: 2023-10-16 | End: 2023-10-18

## 2023-10-16 RX ORDER — OXYCODONE HYDROCHLORIDE 10 MG/1
10 TABLET ORAL
Status: DISCONTINUED | OUTPATIENT
Start: 2023-10-16 | End: 2023-10-17

## 2023-10-16 RX ORDER — HYDROMORPHONE HYDROCHLORIDE 1 MG/ML
1 INJECTION, SOLUTION INTRAMUSCULAR; INTRAVENOUS; SUBCUTANEOUS EVERY 8 HOURS PRN
Status: DISCONTINUED | OUTPATIENT
Start: 2023-10-16 | End: 2023-10-17

## 2023-10-16 RX ORDER — OXYCODONE HYDROCHLORIDE 5 MG/1
5 TABLET ORAL
Status: DISCONTINUED | OUTPATIENT
Start: 2023-10-16 | End: 2023-10-17

## 2023-10-16 RX ADMIN — HEPARIN SODIUM 5000 UNITS: 5000 INJECTION, SOLUTION INTRAVENOUS; SUBCUTANEOUS at 09:30

## 2023-10-16 RX ADMIN — DIAZEPAM 5 MG: 5 INJECTION, SOLUTION INTRAMUSCULAR; INTRAVENOUS at 10:25

## 2023-10-16 RX ADMIN — ONDANSETRON 4 MG: 2 INJECTION INTRAMUSCULAR; INTRAVENOUS at 09:26

## 2023-10-16 RX ADMIN — NYSTATIN 500000 UNITS: 100000 SUSPENSION ORAL at 12:41

## 2023-10-16 RX ADMIN — HEPARIN SODIUM 5000 UNITS: 5000 INJECTION, SOLUTION INTRAVENOUS; SUBCUTANEOUS at 15:36

## 2023-10-16 RX ADMIN — HYDROMORPHONE HYDROCHLORIDE 1 MG: 1 INJECTION, SOLUTION INTRAMUSCULAR; INTRAVENOUS; SUBCUTANEOUS at 15:38

## 2023-10-16 RX ADMIN — NYSTATIN 500000 UNITS: 100000 SUSPENSION ORAL at 17:31

## 2023-10-16 RX ADMIN — NYSTATIN 500000 UNITS: 100000 SUSPENSION ORAL at 23:29

## 2023-10-16 RX ADMIN — FUROSEMIDE 20 MG: 10 INJECTION INTRAMUSCULAR; INTRAVENOUS at 17:31

## 2023-10-16 RX ADMIN — NYSTATIN 500000 UNITS: 100000 SUSPENSION ORAL at 10:32

## 2023-10-16 RX ADMIN — DIAZEPAM 5 MG: 5 INJECTION, SOLUTION INTRAMUSCULAR; INTRAVENOUS at 23:30

## 2023-10-16 RX ADMIN — HEPARIN SODIUM 5000 UNITS: 5000 INJECTION, SOLUTION INTRAVENOUS; SUBCUTANEOUS at 23:30

## 2023-10-16 ASSESSMENT — PAIN DESCRIPTION - PAIN TYPE
TYPE: CHRONIC PAIN
TYPE: CHRONIC PAIN

## 2023-10-16 ASSESSMENT — ENCOUNTER SYMPTOMS
ABDOMINAL PAIN: 1
NAUSEA: 1
VOMITING: 1

## 2023-10-16 NOTE — CONSULTS
PSYCHIATRIC INTAKE EVALUATION(new)  Reason for admission: Lactic acidosis  Reason for consult:Psychiatric Medication Evaluation/Management  Requesting Provider:      Milka Westfall M.D.  Legal Hold Status:          Not on hold  Chart reviewed.         *HPI:       Patient was seen today at bedside for an evaluation.  At the time of encounter she was hyperverbal and tangential to circumstantial at times.  She states that she was admitted to Hospital because she could not eat for days.  Throughout the interview she was frequently making statements that did not correspond to the questions asked.  Additionally she was fluctuating between being elevated and intermittently falling asleep.  More in depth interview deferred until mental status improves.  CAM-ICU performed and positive (met criteria 1 through 3).    Collateral was attempted to be obtained by calling  on phone number on file, unfortunately he was not reached.  Further attempts will be made.    Psychiatric ROS:  Unable to assess due to patient presentation.  Denies AVH, staff documented responding to internal stimuli observed.    Medical ROS (as pertinent):     Unable to assess due to patient presentation      *Psychiatric Examination:  Vitals: BP (!) 154/60   Pulse 88   Temp 35.8 °C (96.5 °F) (Temporal)   Resp 17   Ht 1.829 m (6')   Wt 68.9 kg (151 lb 14.4 oz)   SpO2 99%   BMI 20.60 kg/m²    General Appearance: 68-year-old female, lying in bed wearing hospital garb, falling asleep mid sentence at times, appears confused.  Abnormal Movements: No psychomotor retardation or agitation noted.  No tremor noted  Gait and Posture: Was not evaluated at this time  Speech: Hyperverbal to pressured at times, normal time and volume.  Clear and intelligible, no stuttering or slurring  Thought processes: Perseverating on diazepam medication, at times tangential to circumstantial, not organized, not always logical, not always reality based  Abnormal or Psychotic  Thoughts: Evidence of internal stimuli per staff observations, no overt hallucinations noted during the encounter  Judgement and Insight: Poor/poor  Orientation: Oriented to person only  Recent and Remote Memory: Unable to assess due to patient presentation  Attention Span and Concentration: Poor, CAM ICU criteria 1-3 positive  Language: Fluent in English  Fund of Knowledge: Unable to assess due to patient presentation  Mood and Affect: Elevated, irritable, generally constricted  SI/HI: No evidence of current SI/HI    Past Medical History:   Past Medical History:   Diagnosis Date    Anesthesia     Difficult IV stick    Anxiety     Arthritis     all over    ASTHMA     Atrial fib/flut     Backpain     Bronchitis     5 years    Chronic pain     Colostomy in place ([facility])     COPD (chronic obstructive pulmonary disease) ([facility])     Crohn's disease of colon ([facility])     Dyspnea     History of cardiac murmur     Ileostomy present ([facility])     Infectious disease     MRSA, VancoRSA    Multiple falls     Narcotic dependence ([facility])     Obstruction     Pain     Pneumonia     child and mid 30's    Postherpetic neuralgia     Rosacea     Sleep apnea         Past Psychiatric History:  Unable to obtain from patient due to current presentation  Per Chart Review: OCPD, bipolar 1, unspecified anxiety disorder      Family Hx:   Unable to obtain due to patient presentation, per chart review patient has denied previously family history of psychosis, bipolar, depression, anxiety, suicide, substance use disorders  Social Hx: Lives at home with her     Substances:  Unable to obtain due to patient presentation,    Current Medications:  Current Facility-Administered Medications   Medication Dose Route Frequency Provider Last Rate Last Admin    phosphorus (K-Phos-Neutral) per tablet 250 mg  250 mg Oral BID Milka Westfall M.D.        heparin injection 5,000 Units  5,000 Units Subcutaneous Q8HRS Leonel Rodriguez M.D.   5,000 Units at 10/16/23 0941     Pharmacy Consult Request ...Pain Management Review 1 Each  1 Each Other PHARMACY TO DOSE Leonel Rodriguez M.D.        acetaminophen (Tylenol) tablet 650 mg  650 mg Oral Q6HRS Leonel Rodriguez M.D.   650 mg at 10/13/23 0613    Followed by    [START ON 10/18/2023] acetaminophen (Tylenol) tablet 650 mg  650 mg Oral Q6HRS PRN Leonel Rodriguez M.D.        ondansetron (Zofran) syringe/vial injection 4 mg  4 mg Intravenous Q4HRS PRN Leonel Rodriguez M.D.   4 mg at 10/16/23 0926    hydroxychloroquine (Plaquenil) tablet 300 mg  300 mg Oral DAILY Leonel Rodriguez M.D.        levalbuterol (Xopenex) 0.63 MG/3ML nebulizer solution 0.63 mg  3 mL Inhalation Q4HRS PRN Leonel Rodriguez M.D.        metoprolol tartrate (Lopressor) tablet 50 mg  50 mg Oral BID Leonel Rodriguez M.D.        oxyCODONE immediate-release (Roxicodone) tablet 5 mg  5 mg Oral Q3HRS PRN Milka Westfall M.D.        Or    oxyCODONE immediate release (Roxicodone) tablet 10 mg  10 mg Oral Q3HRS PRN Milka Westfall M.D.        Or    HYDROmorphone (Dilaudid) injection 1 mg  1 mg Intravenous Q4HRS PRN Milka Westfall M.D.   1 mg at 10/15/23 2000    nystatin (Mycostatin) 092373 UNIT/ML suspension 500,000 Units  5 mL Swish & Spit 4X/DAY Milka Westfall M.D.   500,000 Units at 10/16/23 1241    diazePAM (Valium) injection 5 mg  5 mg Intravenous Q6HRS PRN Milka Westfall M.D.   5 mg at 10/16/23 1025        Allergies:  Demerol hcl [meperidine], Methotrexate, Morphine, Promethazine, Remicade [infliximab], Sudafed [pseudoephedrine], Azathioprine sodium, Carafate [sucralfate], Other drug, Sulfa drugs, Sulfasalazine, Amitriptyline, Ativan [lorazepam], Betamethasone, Fentanyl, Lyrica [pregabalin], Methadone, Potassium, Tizanidine, and Toradol [ketorolac tromethamine]       Labs personally reviewed:   Recent Results (from the past 72 hour(s))   Comp Metabolic Panel (CMP)    Collection Time: 10/14/23  7:12 AM   Result Value Ref Range    Sodium 140 135 - 145 mmol/L     Potassium 3.2 (L) 3.6 - 5.5 mmol/L    Chloride 105 96 - 112 mmol/L    Co2 24 20 - 33 mmol/L    Anion Gap 11.0 7.0 - 16.0    Glucose 104 (H) 65 - 99 mg/dL    Bun 14 8 - 22 mg/dL    Creatinine 1.09 0.50 - 1.40 mg/dL    Calcium 9.4 8.5 - 10.5 mg/dL    Correct Calcium 9.6 8.5 - 10.5 mg/dL    AST(SGOT) 29 12 - 45 U/L    ALT(SGPT) 15 2 - 50 U/L    Alkaline Phosphatase 72 30 - 99 U/L    Total Bilirubin 0.5 0.1 - 1.5 mg/dL    Albumin 3.8 3.2 - 4.9 g/dL    Total Protein 6.5 6.0 - 8.2 g/dL    Globulin 2.7 1.9 - 3.5 g/dL    A-G Ratio 1.4 g/dL   MAGNESIUM    Collection Time: 10/14/23  7:12 AM   Result Value Ref Range    Magnesium 1.4 (L) 1.5 - 2.5 mg/dL   PHOSPHORUS    Collection Time: 10/14/23  7:12 AM   Result Value Ref Range    Phosphorus 1.7 (L) 2.5 - 4.5 mg/dL   ESTIMATED GFR    Collection Time: 10/14/23  7:12 AM   Result Value Ref Range    GFR (CKD-EPI) 55 (A) >60 mL/min/1.73 m 2   CBC WITHOUT DIFFERENTIAL    Collection Time: 10/14/23 10:13 AM   Result Value Ref Range    WBC 7.8 4.8 - 10.8 K/uL    RBC 4.26 4.20 - 5.40 M/uL    Hemoglobin 13.0 12.0 - 16.0 g/dL    Hematocrit 38.7 37.0 - 47.0 %    MCV 90.8 81.4 - 97.8 fL    MCH 30.5 27.0 - 33.0 pg    MCHC 33.6 32.2 - 35.5 g/dL    RDW 41.2 35.9 - 50.0 fL    Platelet Count 148 (L) 164 - 446 K/uL    MPV 10.4 9.0 - 12.9 fL   CBC WITHOUT DIFFERENTIAL    Collection Time: 10/15/23  5:30 AM   Result Value Ref Range    WBC 8.2 4.8 - 10.8 K/uL    RBC 4.24 4.20 - 5.40 M/uL    Hemoglobin 13.0 12.0 - 16.0 g/dL    Hematocrit 39.5 37.0 - 47.0 %    MCV 93.2 81.4 - 97.8 fL    MCH 30.7 27.0 - 33.0 pg    MCHC 32.9 32.2 - 35.5 g/dL    RDW 43.2 35.9 - 50.0 fL    Platelet Count 177 164 - 446 K/uL    MPV 11.0 9.0 - 12.9 fL   Basic Metabolic Panel    Collection Time: 10/15/23  5:30 AM   Result Value Ref Range    Sodium 139 135 - 145 mmol/L    Potassium 3.1 (L) 3.6 - 5.5 mmol/L    Chloride 102 96 - 112 mmol/L    Co2 22 20 - 33 mmol/L    Glucose 115 (H) 65 - 99 mg/dL    Bun 12 8 - 22 mg/dL     Creatinine 1.29 0.50 - 1.40 mg/dL    Calcium 9.5 8.5 - 10.5 mg/dL    Anion Gap 15.0 7.0 - 16.0   PHOSPHORUS    Collection Time: 10/15/23  5:30 AM   Result Value Ref Range    Phosphorus 3.3 2.5 - 4.5 mg/dL   MAGNESIUM    Collection Time: 10/15/23  5:30 AM   Result Value Ref Range    Magnesium 1.8 1.5 - 2.5 mg/dL   ESTIMATED GFR    Collection Time: 10/15/23  5:30 AM   Result Value Ref Range    GFR (CKD-EPI) 45 (A) >60 mL/min/1.73 m 2           EKG: QTc 482 on 10/13/2023  Brain Imaging: None  EEG: None         Assessment:  This is a 68-year-old female with past history of symptoms consistent with kely, unspecified anxiety and OCPD (all by history upon chart review) admitted to inpatient unit with lactic acidosis in the setting of urinary retention, Crohn's disease s/p colectomy with ileostomy, and chronic abdominal pain treated with Valium, oxycodone, trazodone, hydroxychloroquine, furosemide and recently took Medrol Dosepak prior to current admission.  During current admission she has had episodes of agitation, paranoia, reported hypersexual behavior and psychiatry was consulted for assessment and recommendations.  During assessment today patient was fluctuating between being hyperverbal, tangential and circumstantial, at times illogical to episodes of somnolence with falling asleep mid sentence.  She was perseverating on receiving diazepam medication.  Per records she has been receiving high doses of pain medications as well as anxiolytics which are likely contributing to her current presentation.  CAM-ICU was performed and positive for first 3 criteria concerning for current agitated delirium.  Further evaluations will be required for better mental health assessment and recommendations; at this time patient would benefit from scaling back on Valium and de-escalation of pain medications as appropriate to improve patient's mental status.    Dx:  #Delirium, current  #Bipolar disorder (by history, currently does not  meet criteria)    Medical:  See medical note      Plan:  Legal hold: Not on hold  Psychotropic medications  Reduce diazepam to 5 mg twice daily as needed.  Recommend de-escalating opioid pain medication use as appropriate  Labs: Recommend obtaining B1, B12, TSH labs for workup of AMS  EKG: Reviewed, QTc 482 on 10/13/2023  Old records reviewed  Collateral attempted to be obtained, unable to reach  by phone.   Discussed the case with: Dr. Law, Dr. Westfall (via [employer])   Psychiatry will follow up    Thank you for the consult.         This note was created using voice recognition software (Dragon). The accuracy of the dictation is limited by the abilities of the software. I have reviewed the note prior to signing. However, error related to voice recognition software and /or scribes may still exist and should be interpreted within the appropriate context.

## 2023-10-16 NOTE — PROGRESS NOTES
Communication with hospitalist r/t pt complaining of 10/10 pain and states that she cannot take oral medication, communication of refusal of oral medication r/t swallow listing medication including oxycodone and metoprolol, review of current vs for pt and pt c/o pain. Assessment at bedside f/u communication with pt to tell hosptialist Dr Milka Westfall of medication needs and pain, states wants diazepam and dilaudid but diazepam first with hospitalist to state not to take together, given diazepam per prn pt choice not dilaudid first. Pt is fall risk and fall precautions ensure in place. Pt is AAOx3 and has confusion and impulsitiviy, talking constantly with hyperfocus on medications taken in past explaining to doctor. Clarificiation with hospitalist no needs for speech therapy for this pt, states okay to give dilaudid first if pt does not take oxycodone, okay to give diladid for pain f/u continues with pain post diazepam.

## 2023-10-16 NOTE — DIETARY
Nutrition Services:     RD received phone call from pt regarding current diet. Pt states she has been experiencing episodes of N/V and would no longer like to receive Naked juice beverage or peach flavored food items. RD able to discuss food preferences and develop meal plan. Current scheduled nourishments includes:  -B: cream of rice, Chobani yogurt smoothie, pureed pears   -L: tortellini with marinara sauce, V8 drink  -D: daily special with orange Magic Cup    RD will f/u to assess adequate PO intake and any changes in preferences    Plan/Recommendations:  Continue current diet order  Addition of food preferences and modifications added to [employer] in collaboration with nutrition representative    Continue addition of ONS, Magic Cup QD per pt request   Encourage intake of meals/ONS  Document intake of all meals/ONS  as % taken in ADL's to provide interdisciplinary communication across all shifts.   Achieve PO intake >50%   Monitor weight.  Continue Zofran PRN, consider alternative antiemetics if indicated   Nutrition rep will continue to see patient for ongoing meal and snack preferences    RD following

## 2023-10-16 NOTE — PROGRESS NOTES
Gastroenterology Progress Note               Author:  MARCO Cm Date & Time Created: 10/16/2023 4:30 PM       Patient ID:  Name:             Jana Overton  YOB: 1955  Age:                 68 y.o.  female  MRN:               [id number]        Medical Decision Making, by Problem:  Active Hospital Problems    Diagnosis     Hypophosphatemia [E83.39]     Abdominal pain [R10.9]     Protein calorie malnutrition (HCC) [E46]     Lactic acid acidosis [E87.20]     Hypomagnesemia [E83.42]     Essential hypertension [I10]     Tachycardia [R00.0]     Hypokalemia [E87.6]     Dehydration [E86.0]     Crohn's disease of small intestine with complication (HCC) [K50.019]            Presenting Chief Complaint:  Abd pain, N/V, ?dysphagia.      History of Present Illness:   The patient is a 67 years old female with a medical history of rheumatoid disease, back pain, Crohn's s/p resection of a bowel surgery, no regular GI doctor, not on any anti-Crohn's disease, [facility] inpatient GI team saw the patient twice in the last year for dysplasia, the patient had 2 upper GI scope as inpatient.     This time, the patient is admitted for urinary tract obstruction, possible UTI, imbalance fluid status.      GI team is consulted for abdominal pain mainly.      So far primary team have CAT scan done, negative CRP and ESR.  No evident bleeding of GI based on output in the ostomy.      Patient is able to drink water and is on clear liquid.  However intermittently the patient feels nausea vomiting after the oral intake sometimes after hours sometime after 10 minutes.  No specific finding by the bedside exam.      Interval History:  10/16/2023: Records obtained from [facility].  Patient had manometry studies done in 2019 showing type II achalasia.  Patient seen at bedside.  Upon encounter, she is verbally aggressive with nursing and CNA at bedside.  Throughout encounter, she is hyperverbal, continuously tangential  "throughout encounter, flight of ideas, and manic and needs to be redirected multiple times to obtain information.  She states that she was \"fired\" from [facility] but had been referred to [facility] for tertiary care.  She states that she was told by [facility] that she \" has the best team here (in [place])\" and they did not see her after she lost her insurance.  She states that she did not have myotomy or Botox injections for her achalasia.  She furthermore states that since she does not have outpatient GI, she has been managing her Crohn's disease through dietary measures.  Acknowledged and praised patient for managing her Crohn's disease as it has been confirmed during this admission that her inflammatory markers are normal confirming that she has been well managing her Crohn's disease and not in an acute flare.  Patient agrees that she has been managing Crohn's disease well but that she is indeed an acute flare for undiscernible reasons.  Regardless, discussed with patient that her Crohn's is well managed and that her problem at hand while inpatient is her achalasia, narrowing seen on esophagram.     Hospital Medications:  Current Facility-Administered Medications   Medication Dose Frequency Provider Last Rate Last Admin    diazePAM (Valium) injection 5 mg  5 mg Q12HRS PRCONNER Westfall M.D.        oxyCODONE immediate-release (Roxicodone) tablet 5 mg  5 mg Q3HRS PRN Milka Westfall M.D.        Or    oxyCODONE immediate release (Roxicodone) tablet 10 mg  10 mg Q3HRS PRCONNER Westfall M.D.        Or    HYDROmorphone (Dilaudid) injection 1 mg  1 mg Q8HRS PRCONNER Westfall M.D.   1 mg at 10/16/23 1538    phosphorus (K-Phos-Neutral) per tablet 250 mg  250 mg BID Milka Westfall M.D.        heparin injection 5,000 Units  5,000 Units Q8HRS Leonel Rodriguez M.D.   5,000 Units at 10/16/23 1536    Pharmacy Consult Request ...Pain Management Review 1 Each  1 Each PHARMACY TO DOSE Leonel Rodriguez M.D.        " acetaminophen (Tylenol) tablet 650 mg  650 mg Q6HRS Leonel Rodriguez M.D.   650 mg at 10/13/23 0613    Followed by    [START ON 10/18/2023] acetaminophen (Tylenol) tablet 650 mg  650 mg Q6HRS PRN Leonel Rodriguez M.D.        ondansetron (Zofran) syringe/vial injection 4 mg  4 mg Q4HRS PRN Leonel Rodriguez M.D.   4 mg at 10/16/23 0926    hydroxychloroquine (Plaquenil) tablet 300 mg  300 mg DAILY Leonel Rodriguez M.D.        levalbuterol (Xopenex) 0.63 MG/3ML nebulizer solution 0.63 mg  3 mL Q4HRS PRN Leonel Rodriguez M.D.        metoprolol tartrate (Lopressor) tablet 50 mg  50 mg BID Leonel Rodriguez M.D.        nystatin (Mycostatin) 790928 UNIT/ML suspension 500,000 Units  5 mL 4X/DAY Milka Westfall M.D.   500,000 Units at 10/16/23 1241   Last reviewed on 10/13/2023 11:45 AM by Keith Calle       Review of Systems:  Review of Systems   Unable to perform ROS: Psychiatric disorder   Gastrointestinal:  Positive for abdominal pain, nausea and vomiting.         Vital signs:  Weight/BMI: Body mass index is 20.6 kg/m².  BP (!) 168/64   Pulse 88   Temp 35.8 °C (96.5 °F) (Temporal)   Resp 19   Ht 1.829 m (6')   Wt 68.9 kg (151 lb 14.4 oz)   SpO2 99%   Vitals:    10/15/23 1949 10/16/23 0406 10/16/23 0800 10/16/23 1538   BP: 139/76 139/73 (!) 154/60 (!) 168/64   Pulse: (!) 111 86 88    Resp: 19 18 17 19   Temp: 36.7 °C (98 °F) 36.7 °C (98 °F) 35.8 °C (96.5 °F)    TempSrc: Temporal Temporal Temporal    SpO2: 95% 98% 99%    Weight:       Height:         Oxygen Therapy:  Pulse Oximetry: 99 %, O2 (LPM): 0, O2 Delivery Device: None - Room Air    Intake/Output Summary (Last 24 hours) at 10/16/2023 1630  Last data filed at 10/16/2023 1300  Gross per 24 hour   Intake --   Output 1350 ml   Net -1350 ml         Physical Exam:  Physical Exam  Vitals and nursing note reviewed.   Constitutional:       General: She is awake. She is not in acute distress.     Appearance: She is normal weight.   HENT:      Right Ear:  External ear normal.      Left Ear: External ear normal.      Nose: Nose normal. No congestion.      Mouth/Throat:      Mouth: Mucous membranes are moist.      Pharynx: Oropharynx is clear.   Eyes:      General: No scleral icterus.     Extraocular Movements: Extraocular movements intact.      Conjunctiva/sclera: Conjunctivae normal.   Cardiovascular:      Rate and Rhythm: Normal rate and regular rhythm.      Pulses: Normal pulses.      Heart sounds: Normal heart sounds.   Pulmonary:      Effort: Pulmonary effort is normal. No respiratory distress.   Abdominal:      General: Abdomen is flat. Bowel sounds are normal. There is no distension.      Palpations: Abdomen is soft.      Tenderness: There is abdominal tenderness. There is no guarding.      Comments: Colostomy bag in place   Musculoskeletal:      Right lower leg: No edema.      Left lower leg: No edema.   Skin:     General: Skin is warm and dry.      Capillary Refill: Capillary refill takes less than 2 seconds.      Coloration: Skin is pale.   Neurological:      Mental Status: She is alert.   Psychiatric:         Speech: Speech is tangential (Requiring frequent redirection).         Behavior: Behavior is aggressive.             Labs:  Recent Labs     10/14/23  0712 10/15/23  0530   SODIUM 140 139   POTASSIUM 3.2* 3.1*   CHLORIDE 105 102   CO2 24 22   BUN 14 12   CREATININE 1.09 1.29   MAGNESIUM 1.4* 1.8   PHOSPHORUS 1.7* 3.3   CALCIUM 9.4 9.5     Recent Labs     10/14/23  0712 10/15/23  0530   ALTSGPT 15  --    ASTSGOT 29  --    ALKPHOSPHAT 72  --    TBILIRUBIN 0.5  --    GLUCOSE 104* 115*     Recent Labs     10/14/23  0712 10/14/23  1013 10/15/23  0530   WBC  --  7.8 8.2   ASTSGOT 29  --   --    ALTSGPT 15  --   --    ALKPHOSPHAT 72  --   --    TBILIRUBIN 0.5  --   --      Recent Labs     10/14/23  1013 10/15/23  0530   RBC 4.26 4.24   HEMOGLOBIN 13.0 13.0   HEMATOCRIT 38.7 39.5   PLATELETCT 148* 177     No results found for this or any previous visit (from  the past 24 hour(s)).    Radiology Review:  IR-US GUIDED PIV   Final Result    Ultrasound-guided PERIPHERAL IV INSERTION performed by    qualified nursing staff as above.      DX-ESOPHAGUS BARIUM SWALLOW SINGLE CONTRAST   Final Result      1.  Distal esophageal lumen is narrowed down to less than 5 mm, likely representing a distal esophageal stricture. It does not widen during the examination.   2.  Several areas of minimal luminal narrowing of the proximal esophagus, down to 10 mm.   3.  Moderate mid and distal esophageal dysmotility.   4.  Gastroesophageal reflux.      CT-ABDOMEN-PELVIS WITH   Final Result      Prior colectomy with LEFT lower quadrant ileostomy   Catheter in place in the urinary bladder. The tip abuts the bladder dome.               MDM (Data Review):   -Records reviewed and summarized in current documentation  -I personally reviewed and interpreted the laboratory results  -I personally reviewed the radiology images    Assessment/Recommendations:  Type II achalasia  Intractable nausea vomiting  Crohn's disease s/p ileostomy-not in acute flare  Delirium  History of bipolar disorder   Hypertension  Chronic pain    MDM:  This is a 68  year old female with a past medical history as listed above who presents to [facility] 10/12/2023 with abdominal pain and nausea. This is chronic in nature. Inflammatory markers normal- no acute crohn's flare. She was able to complete esophogram with findings including distal esophagus lumen narrowed down to less than 5 mm.  There were also several areas of minimal luminal narrowing at the proximal esophagus down to 10 mm.  Moderate mid and distal esophageal dysmotility and gastroesophageal reflux.  Although findings with distal esophageal lumen down to less than 5 mm, patient has been able to tolerate oral intake while inpatient including barium for swallow study itself.  Records were obtained from [facility] including manometry studies done in  2019 showing type II achalasia.  Patient states that she was referred to [facility] for tertiary care for further evaluation but had not received treatment due to losing her insurance and no longer being able to follow-up with local GI groups. She has been scoped twice in the last 12 months (10/21/222 and 02/07/2023), one with dilation and there have been no difficulties passing scope. Furthermore on chart review, her symptoms and findings are chronic dating back several years and she has had no change from her baseline in regards to her GI status. She may be experiencing esophageal spasm. No plan for EGD. While inpatient she has been making specific dietary requests. Recommend documentation of the types of foods/liquids and the amount she is consuming each time. Inflammatory markers on admission are normal showing that patient is managing her crohn's disease and she is not in acute flare requiring treatment. To further support this, would recommend obtaining stool for fecal calprotectin as previously ordered.     Plan:  Diet per primary team- discussed with nursing to accurately document types and amount of food consumed.   Small frequent soft/liquid meals conducive to achalasia encouraged  Fecal Calprotectin pending collection  Follow up OP tertiary GI for type II achalasia  Supportive care  Appreciate Psych recs.     Discussed with patient, nursing, charge RN, Dr. Mahan, Dr. Westfall.         Core Quality Measures   Reviewed items::  Labs, Medications and Radiology reports reviewed

## 2023-10-16 NOTE — PROGRESS NOTES
Communication with hospitalist, okay to give dilaudid with pt continue to refuse all oral medication, refusal of metoprolol now, review of pt current status, BP taken. Pt is continuously talking on phone and off phone with conversation to change frequently with flight of ideas, pt talking about gravy and sail boats during pain assessment and agitated stating everyone should know this and that she is talk talk talk when the brain has no food, conversations are not making sense to reality, okay to give dilaudid now. Dilaudid given per order and pt states pain is 100+ last pain assessment, pt able to move legs independenlty, moves with instruction and elevated on pillow to assist with swelling.

## 2023-10-16 NOTE — PROGRESS NOTES
"2331-pt medicated with Diazepam 5mg IV for c/o bilateral toes spasm, \"look at my toes, they're stiff\"; pt became drowsy but refused to go back to bed, pt kept pacing in the hallways, wobbly. Assisted back to bed; pt won't lie down, sitting on edge of the bed, leaning to her left side almost sleeping. Pt refused bed alarm. Pt would stand up again and go to the toilet.  1247-Pt claimed her milton was not working and it was leaking, noted not much urine on the milton bag; pt's underwear and short pants were dry; bladder scanned, 0ml; pt is requesting Lasix and Aldactone, she said at home she's taking the said medicines; pt wants to talk to the Hospitalist; she wants to keep the lights on so she could talk to the doctor when he comes. On call hospitalist informed but pt is already falling asleep.   "

## 2023-10-16 NOTE — CARE PLAN
The patient is Watcher - Medium risk of patient condition declining or worsening    Shift Goals  Clinical Goals: The pt will state that she feels her pain is managed one time during shift  Patient Goals: DALTON- I want my pain not to hurt  Family Goals: DALTON    Progress made toward(s) clinical / shift goals:  Pt states her pain is 10/10 or 100/10 or 100+/10 and does not agree pain decreases during day. Pt monitored after each intervention for reduced pain and decrease in mobility r/t pain, Pt is able to move independently and easily with movement. Communication with hospitalist r/t pain throughout day, status with confusion of pain assessment. Pt BLE elevated on pillows to assist with pain and pt states is helpful but has many diagnosis and a chronic problem with each system which is why the pt states her pain will not come down. Given one time dilaudid now to assist.     Patient is not progressing towards the following goals:      Problem: Knowledge Deficit - Standard  Goal: Patient and family/care givers will demonstrate understanding of plan of care, disease process/condition, diagnostic tests and medications  Outcome: Not Progressing   Pt with flight of ideas and change in conversation while talking, confusion and delusion during day.   Problem: Psychosocial  Goal: Patient's level of anxiety will decrease  Outcome: Not Progressing  Pt demonstrates anxiety by talking frequently and stating multiple statements and change of subject, irritablilty. With RN inquire pt states only has anxiety when at [facility] but not at home and state she may have today but doesn't now and change of converstation     Goal: Patient's ability to re-evaluate and adapt role responsibilities will improve  Outcome: Not Progressing  Goal: Patient and family will demonstrate ability to cope with life altering diagnosis and/or procedure  Outcome: Not Progressing  Goal: Spiritual and cultural needs incorporated into hospitalization  Outcome: Not  Progressing     Problem: Fall Risk  Goal: Patient will remain free from falls  Outcome: Not Progressing   Pt to tell staff she refuse alarm and state remove, okay for RN to turn on, states understanidng with conversation and okay to leave on.

## 2023-10-16 NOTE — ED NOTES
Medication: metformin 500mg tablet  Last office visit date: 4/21/2023  Medication Refill Protocol Failed.  Failed criteria: see below image. Sent to clinician to review.           Last OV note:  PCOS (polycystic ovarian syndrome) / Hx of endometriosis :  Patient status post vaginal hysterectomy. She just have both of her ovaries. She continues on norethindrone and continues follow up with gynecology group. She continues with exercise and trying to control weight. She is on metformin and will continue the same. This helps with symptomatic management.  Thyroid Stimulating Hormone Reflex; Future   Patient given vomit bag.

## 2023-10-16 NOTE — PROGRESS NOTES
Pt is talking on phone, and when off phone is talking to self in room, continuous talking throughout day and flight of ideas during conversation making it difficult to follow along in conversation. Ideas swapping during sentences or directly thereafter inconsistently. Pt is asking for dilaudid with each interaction but presents with smiling on phone and conversational, no furrow of brow and relaxed appearance of muscle moves easily in bed at this time where pt stated unable to move legs at all earlier. RN to have communication with hospitalist of assessment of pain and pt to continue to state 10/10 continuously or 100/10 for pain with inquiry. Hospitalist, Dr Milka Westfall MD, to state to hold at this time, pt continues with refusal of po medication, hosptialist aware.

## 2023-10-17 ENCOUNTER — APPOINTMENT (OUTPATIENT)
Dept: RADIOLOGY | Facility: MEDICAL CENTER | Age: 68
DRG: 699 | End: 2023-10-17
Attending: HOSPITALIST
Payer: MEDICARE

## 2023-10-17 PROBLEM — R79.89 ABNORMAL TSH: Status: ACTIVE | Noted: 2023-10-17

## 2023-10-17 LAB
ALBUMIN SERPL BCP-MCNC: 4.1 G/DL (ref 3.2–4.9)
ALBUMIN/GLOB SERPL: 1.4 G/DL
ALP SERPL-CCNC: 68 U/L (ref 30–99)
ALT SERPL-CCNC: 21 U/L (ref 2–50)
ANION GAP SERPL CALC-SCNC: 21 MMOL/L (ref 7–16)
AST SERPL-CCNC: 32 U/L (ref 12–45)
BILIRUB SERPL-MCNC: 0.5 MG/DL (ref 0.1–1.5)
BUN SERPL-MCNC: 13 MG/DL (ref 8–22)
CALCIUM ALBUM COR SERPL-MCNC: 10 MG/DL (ref 8.5–10.5)
CALCIUM SERPL-MCNC: 10.1 MG/DL (ref 8.5–10.5)
CHLORIDE SERPL-SCNC: 101 MMOL/L (ref 96–112)
CO2 SERPL-SCNC: 20 MMOL/L (ref 20–33)
CREAT SERPL-MCNC: 1.33 MG/DL (ref 0.5–1.4)
ERYTHROCYTE [DISTWIDTH] IN BLOOD BY AUTOMATED COUNT: 43.3 FL (ref 35.9–50)
GFR SERPLBLD CREATININE-BSD FMLA CKD-EPI: 43 ML/MIN/1.73 M 2
GLOBULIN SER CALC-MCNC: 2.9 G/DL (ref 1.9–3.5)
GLUCOSE SERPL-MCNC: 78 MG/DL (ref 65–99)
HCT VFR BLD AUTO: 37.6 % (ref 37–47)
HGB BLD-MCNC: 12.4 G/DL (ref 12–16)
MAGNESIUM SERPL-MCNC: 1.7 MG/DL (ref 1.5–2.5)
MCH RBC QN AUTO: 30.7 PG (ref 27–33)
MCHC RBC AUTO-ENTMCNC: 33 G/DL (ref 32.2–35.5)
MCV RBC AUTO: 93.1 FL (ref 81.4–97.8)
PHOSPHATE SERPL-MCNC: 2.3 MG/DL (ref 2.5–4.5)
PLATELET # BLD AUTO: 157 K/UL (ref 164–446)
PMV BLD AUTO: 10.7 FL (ref 9–12.9)
POTASSIUM SERPL-SCNC: 3.8 MMOL/L (ref 3.6–5.5)
PROT SERPL-MCNC: 7 G/DL (ref 6–8.2)
RBC # BLD AUTO: 4.04 M/UL (ref 4.2–5.4)
SODIUM SERPL-SCNC: 142 MMOL/L (ref 135–145)
T4 FREE SERPL-MCNC: 1.54 NG/DL (ref 0.93–1.7)
TSH SERPL DL<=0.005 MIU/L-ACNC: 0.36 UIU/ML (ref 0.38–5.33)
VIT B12 SERPL-MCNC: 718 PG/ML (ref 211–911)
WBC # BLD AUTO: 8 K/UL (ref 4.8–10.8)

## 2023-10-17 PROCEDURE — 80053 COMPREHEN METABOLIC PANEL: CPT

## 2023-10-17 PROCEDURE — 84425 ASSAY OF VITAMIN B-1: CPT

## 2023-10-17 PROCEDURE — 36415 COLL VENOUS BLD VENIPUNCTURE: CPT

## 2023-10-17 PROCEDURE — 700105 HCHG RX REV CODE 258: Performed by: STUDENT IN AN ORGANIZED HEALTH CARE EDUCATION/TRAINING PROGRAM

## 2023-10-17 PROCEDURE — 99231 SBSQ HOSP IP/OBS SF/LOW 25: CPT | Mod: GC | Performed by: STUDENT IN AN ORGANIZED HEALTH CARE EDUCATION/TRAINING PROGRAM

## 2023-10-17 PROCEDURE — 700111 HCHG RX REV CODE 636 W/ 250 OVERRIDE (IP): Performed by: HOSPITALIST

## 2023-10-17 PROCEDURE — 99233 SBSQ HOSP IP/OBS HIGH 50: CPT | Performed by: HOSPITALIST

## 2023-10-17 PROCEDURE — 700111 HCHG RX REV CODE 636 W/ 250 OVERRIDE (IP): Performed by: STUDENT IN AN ORGANIZED HEALTH CARE EDUCATION/TRAINING PROGRAM

## 2023-10-17 PROCEDURE — 84443 ASSAY THYROID STIM HORMONE: CPT

## 2023-10-17 PROCEDURE — A9270 NON-COVERED ITEM OR SERVICE: HCPCS | Performed by: STUDENT IN AN ORGANIZED HEALTH CARE EDUCATION/TRAINING PROGRAM

## 2023-10-17 PROCEDURE — 84439 ASSAY OF FREE THYROXINE: CPT

## 2023-10-17 PROCEDURE — G0378 HOSPITAL OBSERVATION PER HR: HCPCS

## 2023-10-17 PROCEDURE — 700111 HCHG RX REV CODE 636 W/ 250 OVERRIDE (IP): Mod: JZ | Performed by: STUDENT IN AN ORGANIZED HEALTH CARE EDUCATION/TRAINING PROGRAM

## 2023-10-17 PROCEDURE — 85027 COMPLETE CBC AUTOMATED: CPT

## 2023-10-17 PROCEDURE — 700102 HCHG RX REV CODE 250 W/ 637 OVERRIDE(OP): Performed by: STUDENT IN AN ORGANIZED HEALTH CARE EDUCATION/TRAINING PROGRAM

## 2023-10-17 PROCEDURE — 84100 ASSAY OF PHOSPHORUS: CPT

## 2023-10-17 PROCEDURE — 83735 ASSAY OF MAGNESIUM: CPT

## 2023-10-17 PROCEDURE — 82607 VITAMIN B-12: CPT

## 2023-10-17 RX ORDER — OXYCODONE HYDROCHLORIDE 5 MG/1
5 TABLET ORAL
Status: DISCONTINUED | OUTPATIENT
Start: 2023-10-17 | End: 2023-10-24

## 2023-10-17 RX ORDER — HYDROMORPHONE HYDROCHLORIDE 1 MG/ML
1 INJECTION, SOLUTION INTRAMUSCULAR; INTRAVENOUS; SUBCUTANEOUS ONCE
Status: COMPLETED | OUTPATIENT
Start: 2023-10-18 | End: 2023-10-18

## 2023-10-17 RX ORDER — MAGNESIUM SULFATE HEPTAHYDRATE 40 MG/ML
2 INJECTION, SOLUTION INTRAVENOUS ONCE
Status: COMPLETED | OUTPATIENT
Start: 2023-10-17 | End: 2023-10-17

## 2023-10-17 RX ORDER — VALPROATE SODIUM 100 MG/ML
500 INJECTION INTRAVENOUS EVERY EVENING
Status: DISCONTINUED | OUTPATIENT
Start: 2023-10-17 | End: 2023-10-17

## 2023-10-17 RX ORDER — HYDROMORPHONE HYDROCHLORIDE 1 MG/ML
0.5 INJECTION, SOLUTION INTRAMUSCULAR; INTRAVENOUS; SUBCUTANEOUS EVERY 8 HOURS PRN
Status: DISCONTINUED | OUTPATIENT
Start: 2023-10-17 | End: 2023-10-28 | Stop reason: HOSPADM

## 2023-10-17 RX ORDER — OXYCODONE HYDROCHLORIDE 10 MG/1
10 TABLET ORAL
Status: DISCONTINUED | OUTPATIENT
Start: 2023-10-17 | End: 2023-10-24

## 2023-10-17 RX ADMIN — NYSTATIN 500000 UNITS: 100000 SUSPENSION ORAL at 10:27

## 2023-10-17 RX ADMIN — NYSTATIN 500000 UNITS: 100000 SUSPENSION ORAL at 22:03

## 2023-10-17 RX ADMIN — NYSTATIN 500000 UNITS: 100000 SUSPENSION ORAL at 17:29

## 2023-10-17 RX ADMIN — HYDROMORPHONE HYDROCHLORIDE 1 MG: 1 INJECTION, SOLUTION INTRAMUSCULAR; INTRAVENOUS; SUBCUTANEOUS at 02:01

## 2023-10-17 RX ADMIN — HYDROMORPHONE HYDROCHLORIDE 0.5 MG: 1 INJECTION, SOLUTION INTRAMUSCULAR; INTRAVENOUS; SUBCUTANEOUS at 11:03

## 2023-10-17 RX ADMIN — DIAZEPAM 5 MG: 5 INJECTION, SOLUTION INTRAMUSCULAR; INTRAVENOUS at 17:40

## 2023-10-17 RX ADMIN — HEPARIN SODIUM 5000 UNITS: 5000 INJECTION, SOLUTION INTRAVENOUS; SUBCUTANEOUS at 22:03

## 2023-10-17 RX ADMIN — ONDANSETRON 4 MG: 2 INJECTION INTRAMUSCULAR; INTRAVENOUS at 10:22

## 2023-10-17 RX ADMIN — VALPROATE SODIUM 500 MG: 100 INJECTION, SOLUTION INTRAVENOUS at 22:04

## 2023-10-17 RX ADMIN — MAGNESIUM SULFATE HEPTAHYDRATE 2 G: 2 INJECTION, SOLUTION INTRAVENOUS at 17:31

## 2023-10-17 RX ADMIN — HYDROMORPHONE HYDROCHLORIDE 0.5 MG: 1 INJECTION, SOLUTION INTRAMUSCULAR; INTRAVENOUS; SUBCUTANEOUS at 22:31

## 2023-10-17 ASSESSMENT — ENCOUNTER SYMPTOMS
VOMITING: 1
FEVER: 0
COUGH: 0
CLAUDICATION: 0
DIZZINESS: 0
DOUBLE VISION: 0
DEPRESSION: 0
PND: 0
BRUISES/BLEEDS EASILY: 0
BACK PAIN: 0
CHILLS: 0
BLURRED VISION: 0
RESPIRATORY NEGATIVE: 1
NAUSEA: 1
CARDIOVASCULAR NEGATIVE: 1
PALPITATIONS: 0
HEARTBURN: 0
HEMOPTYSIS: 0
ABDOMINAL PAIN: 1
CONSTITUTIONAL NEGATIVE: 1
MYALGIAS: 0
HEADACHES: 0
EYES NEGATIVE: 1
WHEEZING: 0

## 2023-10-17 ASSESSMENT — PAIN DESCRIPTION - PAIN TYPE
TYPE: CHRONIC PAIN
TYPE: CHRONIC PAIN
TYPE: ACUTE PAIN;CHRONIC PAIN
TYPE: ACUTE PAIN;CHRONIC PAIN;NEUROPATHIC PAIN

## 2023-10-17 NOTE — CONSULTS
PSYCHIATRIC FOLLOW-UP:(established)  *Reason for admission:    Nausea, Vomiting, abdominal pain, poor PO intake  *Legal Hold Status:       Not on hold       Chart reviewed.       Reason for consult: Possible Bipolar Disorder    *HPI:          Patient was seen today at bedside, she was a lot more alert and fully oriented after de-escalating Valium and pain medicine.  She was hyperverbal with pressured speech, circumstantial, grandiose, demanding and intrusive.  She stated she did not sleep at all last night.  She was oriented to person, place, time.  She was able to name days of the week backwards and today CAM-ICU was negative.  Per nursing presentation has been consistent all morning without waxing and waning.  She continues to perseverate on receiving less Valium and pain medication.    Medical ROS (as pertinent):     Review of Systems   Constitutional: Negative.    HENT: Negative.     Eyes: Negative.    Respiratory: Negative.     Cardiovascular: Negative.    Gastrointestinal:  Positive for nausea.   Genitourinary: Negative.            *Psychiatric Examination:  Vitals: BP (!) 148/66   Pulse 100   Temp 36.6 °C (97.8 °F) (Temporal)   Resp 18   Ht 1.829 m (6')   Wt 68.9 kg (151 lb 14.4 oz)   SpO2 97%   BMI 20.60 kg/m²    General Appearance: 68-year-old female, appears stated age, wearing hospital garb, demanding, intrusive  Abnormal Movements: Mild upper extremity tremor present.  Gait and Posture: Not evaluated  Speech: Pressured, hyperverbal, normal volume.  Thought processes: Circumstantial, disorganized, not logical, grandiose  Associations: Prominent loose associations  Abnormal or Psychotic Thoughts: No evidence of AVH  Judgement and Insight: Poor/poor  Orientation: Oriented to place, person, time  Recent and Remote Memory: Grossly intact although difficult to fully assess due to switching topics frequently  Attention Span and Concentration: Grossly intact as evidenced by ability to name days of the  week backwards and raised in hand every time A is mentioned  Language: Fluent in English  Fund of Knowledge: Grossly appropriate for age  Mood and Affect: Elevated, irritable, labile going from anger to laughter to crying a span of a few minutes  SI/HI: Denies/denies      EKG: QTc 482 on 10/13/2023  Brain Imaging: None  EEG: None       *Labs personally reviewed:   Recent Results (from the past 24 hour(s))   Comp Metabolic Panel    Collection Time: 10/17/23  8:00 AM   Result Value Ref Range    Sodium 142 135 - 145 mmol/L    Potassium 3.8 3.6 - 5.5 mmol/L    Chloride 101 96 - 112 mmol/L    Co2 20 20 - 33 mmol/L    Anion Gap 21.0 (H) 7.0 - 16.0    Glucose 78 65 - 99 mg/dL    Bun 13 8 - 22 mg/dL    Creatinine 1.33 0.50 - 1.40 mg/dL    Calcium 10.1 8.5 - 10.5 mg/dL    Correct Calcium 10.0 8.5 - 10.5 mg/dL    AST(SGOT) 32 12 - 45 U/L    ALT(SGPT) 21 2 - 50 U/L    Alkaline Phosphatase 68 30 - 99 U/L    Total Bilirubin 0.5 0.1 - 1.5 mg/dL    Albumin 4.1 3.2 - 4.9 g/dL    Total Protein 7.0 6.0 - 8.2 g/dL    Globulin 2.9 1.9 - 3.5 g/dL    A-G Ratio 1.4 g/dL   VITAMIN B12    Collection Time: 10/17/23  8:00 AM   Result Value Ref Range    Vitamin B12 -True Cobalamin 718 211 - 911 pg/mL   TSH    Collection Time: 10/17/23  8:00 AM   Result Value Ref Range    TSH 0.360 (L) 0.380 - 5.330 uIU/mL   MAGNESIUM    Collection Time: 10/17/23  8:00 AM   Result Value Ref Range    Magnesium 1.7 1.5 - 2.5 mg/dL   PHOSPHORUS    Collection Time: 10/17/23  8:00 AM   Result Value Ref Range    Phosphorus 2.3 (L) 2.5 - 4.5 mg/dL   ESTIMATED GFR    Collection Time: 10/17/23  8:00 AM   Result Value Ref Range    GFR (CKD-EPI) 43 (A) >60 mL/min/1.73 m 2     Lab Results   Component Value Date/Time    WBC 8.0 10/17/2023 09:51 AM    WBC 7.9 02/10/2010 04:04 PM    RBC 4.04 (L) 10/17/2023 09:51 AM    RBC 4.86 02/10/2010 04:04 PM    HEMOGLOBIN 12.4 10/17/2023 09:51 AM    HEMATOCRIT 37.6 10/17/2023 09:51 AM    MCV 93.1 10/17/2023 09:51 AM    MCV 86 02/10/2010  04:04 PM    MCH 30.7 10/17/2023 09:51 AM    MCH 27.8 02/10/2010 04:04 PM    MCHC 33.0 10/17/2023 09:51 AM    MPV 10.7 10/17/2023 09:51 AM    NEUTSPOLYS 77.00 (H) 10/12/2023 10:58 PM    LYMPHOCYTES 11.90 (L) 10/12/2023 10:58 PM    MONOCYTES 9.70 10/12/2023 10:58 PM    EOSINOPHILS 0.50 10/12/2023 10:58 PM    EOSINOPHILS 34 01/28/2021 02:40 PM    BASOPHILS 0.40 10/12/2023 10:58 PM    HYPOCHROMIA 1+ 03/15/2014 10:02 AM    [id number]      Current medications:  Current Facility-Administered Medications   Medication Dose Route Frequency Provider Last Rate Last Admin    diazePAM (Valium) injection 5 mg  5 mg Intravenous Q12HRS PRCONNER Westfall M.D.   5 mg at 10/16/23 2330    oxyCODONE immediate-release (Roxicodone) tablet 5 mg  5 mg Oral Q3HRS PRCONNER Westfall M.D.        Or    oxyCODONE immediate release (Roxicodone) tablet 10 mg  10 mg Oral Q3HRS PRCONNER Westfall M.D.        Or    HYDROmorphone (Dilaudid) injection 1 mg  1 mg Intravenous Q8HRS PRCONNER Westfall M.D.   1 mg at 10/17/23 0201    heparin injection 5,000 Units  5,000 Units Subcutaneous Q8HRS Leonel Rodriguez M.D.   5,000 Units at 10/16/23 2330    Pharmacy Consult Request ...Pain Management Review 1 Each  1 Each Other PHARMACY TO DOSE Leonel Rodriguez M.D.        acetaminophen (Tylenol) tablet 650 mg  650 mg Oral Q6HRS Leonel Rodriguez M.D.   650 mg at 10/13/23 0613    Followed by    [START ON 10/18/2023] acetaminophen (Tylenol) tablet 650 mg  650 mg Oral Q6HRS PRCONNER Rodriguez M.D.        ondansetron (Zofran) syringe/vial injection 4 mg  4 mg Intravenous Q4HRS PRN Leonel Rodriguez M.D.   4 mg at 10/16/23 0926    hydroxychloroquine (Plaquenil) tablet 300 mg  300 mg Oral DAILY Leonel Rodriguez M.D.        levalbuterol (Xopenex) 0.63 MG/3ML nebulizer solution 0.63 mg  3 mL Inhalation Q4HRS PRN Leonel Rodriguez M.D.        metoprolol tartrate (Lopressor) tablet 50 mg  50 mg Oral BID Leonel Rodriguez M.D.        nystatin (Mycostatin) 497398  UNIT/ML suspension 500,000 Units  5 mL Swish & Spit 4X/DAY Milka Westfall M.D.   500,000 Units at 10/16/23 1641       Assessment:  This is a 68-year-old female with past history symptoms consistent with kely, unspecified anxiety and OC PD (all by history from chart review) hospitalized for lactic acidosis.  Patient appears to no longer be meeting criteria for delirium after the escalating Valium and Dilaudid use.  She currently meets criteria for acute kely as patient is grandiose, talkative, has pressured speech, not sleeping, irritable, easily distractible, mood lability present.  At this time she would benefit from Depakote to address kely symptoms as well as nausea and vomiting concerns.  Additionally, Depakote can be administered IV.  Patient consent pending for formal recommendation.  Her TSH was low and further testing with reflex to T4, T3 is recommended.    Dx:    #Bipolar disorder (by history), currently manic  #Delirium, current (resolved)    Medical :  See medical note      Plan:  1- Legal hold:Not on hold  2- Psychotropic medications   *Recommend avoiding increasing diazepam or opioid pain medications at this time   *Currently considering Depakote for kely but patient consent has not been obtained yet.  3- Labs ordered/reviewed: Reviewed, recommend repeating TSH with T4, T3.  - Discussed the case with: Dr. Law, Dr. Valencia (via Vo[employer]te)  4- Psychiatry will follow up    Thank you for the consult.       This note was created using voice recognition software (Dragon). The accuracy of the dictation is limited by the abilities of the software. I have reviewed the note prior to signing. However, error related to voice recognition software and /or scribes may still exist and should be interpreted within the appropriate context.

## 2023-10-17 NOTE — PROGRESS NOTES
"Hospital Medicine Daily Progress Note    Date of Service  10/16/2023    Chief Complaint  Jana Overton is a 68 y.o. female admitted 10/12/2023 with abdominal pain, nausea    Hospital Course  68 y.o. female who presented 10/12/2023  with urinary retention, nausea.  Patient has a history of Crohn's disease status post colectomy with ileostomy on left side, hypertension, chronic pain.  She has had 2 recent emergency room visits due to worsening of chronic abdominal pain and difficulty tolerating p.o. intake with failure of p.o. Zofran at home.  On her last ED visit she had a borderline FLORES with creatinine increased to 1.5 from baseline of 1.2.  She was discharged home after some IV fluids.  She presents again due to concern for decreased urinary output.  She had a Sage placed at urology office for unclear reason however did not drain much urine.  Kidney function stable from last week however did have elevated lactic acid to 3.9.  After some IV fluids she is making some dark urine.  Due to concern for elevated lactic acid and inability to tolerate p.o. intake admission was requested.  CT abdomen unremarkable  Lactic acid 3.9 on admission, which has been resolved  ESR, CRP negative    Interval Problem Update  Seen patient at bedside  Patient is very hyperverbal and tangential.   Unable to answer questions appropriately  She refused to take po meds. State po meds will make her nausea, vomiting  However she is able to take po diet, however per patient she can only take one bite every 10 mins  She reports significant abdominal pain and leg cram. She has been talking in the room continuously. No sign of severe pain. Asking dialudid and valium iv. She states \"I used to inject iv dilaudid 4mg every 2h to my chest.\" \"I took valium 5mg for muscle spasm. If not working in 20 mins, I took another 5mg.\"    Paranoid and delirium  I have spoke to psychiatry, concerning delirium due to   I have spoke to psychiatry  Concerning " delirium due to Valium, I have reduced valium to 5mg bid prn  Ordered VitB1, B12, TSH  Discussed with GI    I have discussed this patient's plan of care and discharge plan at IDT rounds today with Case Management, Nursing, Nursing leadership, and other members of the IDT team.    Consultants/Specialty  GI    Code Status  Full Code    Disposition  The patient is not medically cleared for discharge to home or a post-acute facility.      I have placed the appropriate orders for post-discharge needs.    Review of Systems  Review of Systems   Gastrointestinal:  Positive for abdominal pain, nausea and vomiting.        Physical Exam  Temp:  [35.8 °C (96.5 °F)-37.2 °C (98.9 °F)] 37.2 °C (98.9 °F)  Pulse:  [] 115  Resp:  [17-19] 18  BP: (132-168)/(59-80) 132/59  SpO2:  [97 %-99 %] 98 %    Physical Exam  Vitals and nursing note reviewed.   Constitutional:       Appearance: Normal appearance. She is ill-appearing.   HENT:      Head: Normocephalic and atraumatic.      Nose: Nose normal.      Mouth/Throat:      Pharynx: Oropharynx is clear.   Eyes:      Extraocular Movements: Extraocular movements intact.      Conjunctiva/sclera: Conjunctivae normal.      Pupils: Pupils are equal, round, and reactive to light.   Cardiovascular:      Rate and Rhythm: Normal rate and regular rhythm.      Pulses: Normal pulses.      Heart sounds: Normal heart sounds.   Pulmonary:      Effort: Pulmonary effort is normal.      Breath sounds: Normal breath sounds.   Abdominal:      General: Abdomen is flat. Bowel sounds are normal.      Palpations: Abdomen is soft.      Tenderness: There is abdominal tenderness.      Comments: Colostomy bag in place   Musculoskeletal:         General: Normal range of motion.      Cervical back: Normal range of motion and neck supple.   Skin:     General: Skin is warm and dry.   Neurological:      General: No focal deficit present.      Mental Status: She is alert and oriented to person, place, and time. Mental  status is at baseline.   Psychiatric:      Comments: Anxious  Tangential thought process         Fluids    Intake/Output Summary (Last 24 hours) at 10/16/2023 2101  Last data filed at 10/16/2023 1700  Gross per 24 hour   Intake 360 ml   Output 1500 ml   Net -1140 ml         Laboratory  Recent Labs     10/14/23  1013 10/15/23  0530   WBC 7.8 8.2   RBC 4.26 4.24   HEMOGLOBIN 13.0 13.0   HEMATOCRIT 38.7 39.5   MCV 90.8 93.2   MCH 30.5 30.7   MCHC 33.6 32.9   RDW 41.2 43.2   PLATELETCT 148* 177   MPV 10.4 11.0       Recent Labs     10/14/23  0712 10/15/23  0530   SODIUM 140 139   POTASSIUM 3.2* 3.1*   CHLORIDE 105 102   CO2 24 22   GLUCOSE 104* 115*   BUN 14 12   CREATININE 1.09 1.29   CALCIUM 9.4 9.5                     Imaging  IR-US GUIDED PIV   Final Result    Ultrasound-guided PERIPHERAL IV INSERTION performed by    qualified nursing staff as above.      DX-ESOPHAGUS BARIUM SWALLOW SINGLE CONTRAST   Final Result      1.  Distal esophageal lumen is narrowed down to less than 5 mm, likely representing a distal esophageal stricture. It does not widen during the examination.   2.  Several areas of minimal luminal narrowing of the proximal esophagus, down to 10 mm.   3.  Moderate mid and distal esophageal dysmotility.   4.  Gastroesophageal reflux.      CT-ABDOMEN-PELVIS WITH   Final Result      Prior colectomy with LEFT lower quadrant ileostomy   Catheter in place in the urinary bladder. The tip abuts the bladder dome.              Assessment/Plan  * Lactic acid acidosis- (present on admission)  Assessment & Plan  3.9 on presentation  In setting of dehydration, nausea/vomiting  IV fluid resuscitation  Trend lactic acid, have ordered repeat  Patient received IV fluid, lactic acidosis resolved    Delirium  Assessment & Plan  hyperverbal and tangential. Unable to answer questions appropriately. Need frequent redirection. Unclear etiology, due to Valium?  I have consulted psych, will decrease valium to 5mg iv bid  prn  Check B12, B1, TSH  Appreciated psych recommendations.     Hypophosphatemia  Assessment & Plan  Replaced as indicated with iv kphos    Protein calorie malnutrition (HCC)  Assessment & Plan  Severe abdomina pain, nausea and vomiting, unable to oral diet for 1 to 2 weeks  I have ordered boost  Dietitian consult    Abdominal pain  Assessment & Plan  Diffuse abdominal pain, nausea and vomiting in the setting of Crohn disease, status post colectomy  CT abdomen unremarkable for acute pathology.  ESR/CRP negative.  Denies bloody stool  Unclear etiology  Refused to take p.o. medications and diet. Requested iv dilaudid   GI rec caloproectin and speech evaluation, which are ordered  Multimodal pain managements including po and iv narcotics prn. Monitoring respiratory status and sedation score    1015: Patient reports abdominal pain, nausea and vomiting  Refuse to take po meds  She is able to take some of po diet  Pending esophagram   10/16: She reports significant abdominal pain, nausea and vomiting. Refused to take po meds  Esophagram reviewed  I have discussed with GI  Minimize narcotics use.       Hypomagnesemia- (present on admission)  Assessment & Plan  Replaced with iv mag      Essential hypertension- (present on admission)  Assessment & Plan  Continue metoprolol, holding diuretics in setting of dehydration    Tachycardia  Assessment & Plan  Has been persistently in 110's  In setting of dehydration  Have ordered twelve-lead EKG to further assess      Hypokalemia  Assessment & Plan  Refused po supplements  I have ordered iv kcl replacement    Dehydration  Assessment & Plan  With decreased urine output as well as decreased ostomy output  In setting of nausea and vomiting  IV fluids, monitor BMP and urine output, has started to make dark urine after initial IV fluid resuscitation  Antiemetics    Crohn's disease of small intestine with complication (HCC)- (present on admission)  Assessment & Plan  Has ostomy in place.   Follows with Dr. Davalos per patient  Recently completed Medrol Dosepak, will hold off on further steroids as unclear that there is current Crohn's flare given no ostomy output or findings of inflammation on CT abdomen  Check inflammatory markers ESR, CRP negative  No bloody bowel movement or obstruction, chance of acute Crohn small bowel flare is not high.   Ordered calprotectin  Consulted GI, discussed with GI         VTE prophylaxis:    heparin ppx      I have performed a physical exam and reviewed and updated ROS and Plan today (10/16/2023). In review of yesterday's note (10/15/2023), there are no changes except as documented above.    Patient is has a high medical complexity, complex decision making and is at high risk for complication, morbidity, and mortality.    My total time spent caring for the patient on the day of the encounter was 52  minutes.   This does not include time spent on separately billable procedures/tests.

## 2023-10-17 NOTE — CARE PLAN
The patient is Stable - Low risk of patient condition declining or worsening    Shift Goals  Clinical Goals: Pt will maintain pain level <4 throughout the shift  Patient Goals: Sleep and rest  Family Goals: DALTON    Progress made toward(s) clinical / shift goals:      Patient is not progressing towards the following goals:

## 2023-10-17 NOTE — ASSESSMENT & PLAN NOTE
hyperverbal and tangential. Unable to answer questions appropriately. Need frequent redirection. Unclear etiology, due to Valium?  I have consulted psych, will decrease valium to 5mg iv bid prn  Check B12, B1, TSH  Appreciated psych recommendations.     Discussed with psych team.  F/u t3, t4 is normal.   depakote iv qhs per psych.   Discussed with psych they are recommending MRI brain, MRI brain negative for acute process.   EEG negative  Lumbar puncture was done today.  Psychiatry recommended Depakote 500 mg twice daily IV, ordered

## 2023-10-17 NOTE — PROGRESS NOTES
Thoughts and conversations scattered, refuses the bed alarm despite education. Hyper fixated on topics.  Wanting to take a shower which lasted 2 hours, demanding staff clean and change her ostomy while in the shower- has her own supplies at the bedside.   Negative comments and complaints about this entire [facility] organization, unable to educate or explain without conflict; argues and demands resolutions instantly. Unable to satisfy her every need. Verbally abusive and belittling to staff at every attempt to help. NAMS came to bedside due to multiple phone calls to the , phone number offered to her to call in the morning to patient relations.  Per day shift, able to eat some lunch taking 1 bite of food every 10 minutes with an alarm set to remind her. Has her own food in our refrig but refused it because we lost her organic gray- then placed it in a cooler at the bedside. Refuses all PO meds despite offering it to being crushed, will only take meds offered in IV form; MD aware. Not happy with her prn valium and diuladid orders; will have to address that in the morning with MD.    Sitting at nurses station around 0400, slowly eating greek food - 1 bite every 10 mins  along with water and requesting a magic cup

## 2023-10-17 NOTE — THERAPY
Physical Therapy Contact Note    Patient Name: Jana Overton  Age:  68 y.o., Sex:  female  Medical Record #: [id number]  Today's Date: 10/17/2023    Re-ordered received; pt has no focal deficits to warrant acute PT involvement; of note, pt reporting could not move legs per chart review but is inconsistent and started the day after pt requested for PT to continue; her PT needs/request are outpatient and should be deferred until after discharge; pts current needs appear to be medical/psych in nature; and once stable from that perspective, if having difficulty discharging from hospital; please reconsult; will not follow, pt is functionally capable of mobilizing with RN staff per brent 10/15/23;     Nidia LEMONS, PT,  [phone]

## 2023-10-17 NOTE — PROGRESS NOTES
"Hospital Medicine Daily Progress Note    Date of Service  10/17/2023    Chief Complaint  Jana Overton is a 68 y.o. female admitted 10/12/2023 with abdominal pain, nausea    Hospital Course  68 y.o. female who presented 10/12/2023  with urinary retention, nausea.  Patient has a history of Crohn's disease status post colectomy with ileostomy on left side, hypertension, chronic pain.  She has had 2 recent emergency room visits due to worsening of chronic abdominal pain and difficulty tolerating p.o. intake with failure of p.o. Zofran at home.  On her last ED visit she had a borderline FLORES with creatinine increased to 1.5 from baseline of 1.2.  She was discharged home after some IV fluids.  She presents again due to concern for decreased urinary output.  She had a Sage placed at urology office for unclear reason however did not drain much urine.  Kidney function stable from last week however did have elevated lactic acid to 3.9.  After some IV fluids she is making some dark urine.  Due to concern for elevated lactic acid and inability to tolerate p.o. intake admission was requested.  CT abdomen unremarkable  Lactic acid 3.9 on admission, which has been resolved  ESR, CRP negative    Interval Problem Update  Seen patient at bedside  Patient is very hyperverbal and tangential.   Unable to answer questions appropriately  She refused to take po meds. State po meds will make her nausea, vomiting  However she is able to take po diet, however per patient she can only take one bite every 10 mins  She reports significant abdominal pain and leg cram. She has been talking in the room continuously. No sign of severe pain. Asking dialudid and valium iv. She states \"I used to inject iv dilaudid 4mg every 2h to my chest.\" \"I took valium 5mg for muscle spasm. If not working in 20 mins, I took another 5mg.\"    Paranoid and delirium  I have spoke to psychiatry, concerning delirium due to   I have spoke to psychiatry  Concerning " delirium due to Valium, I have reduced valium to 5mg bid prn  Ordered VitB1, B12, TSH  Discussed with GI      10/17 in bed, patient is new to me today, discussed with GI and psych team, continue supportive treatment will start depakote per psych recommendation, iv fluids, close monitoring. F/u t4 level.     I have discussed this patient's plan of care and discharge plan at IDT rounds today with Case Management, Nursing, Nursing leadership, and other members of the IDT team.    Consultants/Specialty  GI  Psych         Code Status  Full Code    Disposition  The patient is not medically cleared for discharge to home or a post-acute facility.      I have placed the appropriate orders for post-discharge needs.    Review of Systems  Review of Systems   Constitutional:  Negative for chills and fever.   Eyes:  Negative for blurred vision and double vision.   Respiratory:  Negative for cough, hemoptysis and wheezing.    Cardiovascular:  Negative for chest pain, palpitations, claudication, leg swelling and PND.   Gastrointestinal:  Positive for abdominal pain, nausea and vomiting. Negative for heartburn.   Genitourinary:  Negative for hematuria and urgency.   Musculoskeletal:  Negative for back pain and myalgias.   Skin:  Negative for rash.   Neurological:  Negative for dizziness and headaches.   Endo/Heme/Allergies:  Does not bruise/bleed easily.   Psychiatric/Behavioral:  Negative for depression.         Physical Exam  Temp:  [36 °C (96.8 °F)-37.2 °C (98.9 °F)] 36.6 °C (97.8 °F)  Pulse:  [100-115] 100  Resp:  [18] 18  BP: (127-159)/(59-88) 148/66  SpO2:  [94 %-98 %] 97 %    Physical Exam  Vitals and nursing note reviewed.   Constitutional:       Appearance: Normal appearance. She is ill-appearing.   HENT:      Head: Normocephalic and atraumatic.      Nose: Nose normal.      Mouth/Throat:      Pharynx: Oropharynx is clear.   Eyes:      General: No scleral icterus.        Right eye: No discharge.   Cardiovascular:      Rate  and Rhythm: Normal rate and regular rhythm.      Pulses: Normal pulses.      Heart sounds: Normal heart sounds.   Pulmonary:      Effort: Pulmonary effort is normal.      Breath sounds: Normal breath sounds.   Abdominal:      General: Abdomen is flat. Bowel sounds are normal. There is no distension.      Palpations: Abdomen is soft.      Tenderness: There is abdominal tenderness.      Comments: Colostomy bag in place   Musculoskeletal:         General: Normal range of motion.      Cervical back: Normal range of motion and neck supple.   Skin:     General: Skin is warm and dry.   Neurological:      General: No focal deficit present.      Mental Status: She is alert and oriented to person, place, and time. Mental status is at baseline.   Psychiatric:      Comments: Anxious  Tangential thought process         Fluids    Intake/Output Summary (Last 24 hours) at 10/17/2023 1600  Last data filed at 10/17/2023 1100  Gross per 24 hour   Intake 480 ml   Output 1650 ml   Net -1170 ml         Laboratory  Recent Labs     10/15/23  0530 10/17/23  0951   WBC 8.2 8.0   RBC 4.24 4.04*   HEMOGLOBIN 13.0 12.4   HEMATOCRIT 39.5 37.6   MCV 93.2 93.1   MCH 30.7 30.7   MCHC 32.9 33.0   RDW 43.2 43.3   PLATELETCT 177 157*   MPV 11.0 10.7       Recent Labs     10/15/23  0530 10/17/23  0800   SODIUM 139 142   POTASSIUM 3.1* 3.8   CHLORIDE 102 101   CO2 22 20   GLUCOSE 115* 78   BUN 12 13   CREATININE 1.29 1.33   CALCIUM 9.5 10.1                     Imaging  IR-US GUIDED PIV   Final Result    Ultrasound-guided PERIPHERAL IV INSERTION performed by    qualified nursing staff as above.      IR-US GUIDED PIV   Final Result    Ultrasound-guided PERIPHERAL IV INSERTION performed by    qualified nursing staff as above.      DX-ESOPHAGUS BARIUM SWALLOW SINGLE CONTRAST   Final Result      1.  Distal esophageal lumen is narrowed down to less than 5 mm, likely representing a distal esophageal stricture. It does not widen during the examination.   2.   Several areas of minimal luminal narrowing of the proximal esophagus, down to 10 mm.   3.  Moderate mid and distal esophageal dysmotility.   4.  Gastroesophageal reflux.      CT-ABDOMEN-PELVIS WITH   Final Result      Prior colectomy with LEFT lower quadrant ileostomy   Catheter in place in the urinary bladder. The tip abuts the bladder dome.              Assessment/Plan  * Lactic acid acidosis- (present on admission)  Assessment & Plan  3.9 on presentation  In setting of dehydration, nausea/vomiting  IV fluid resuscitation  Trend lactic acid, have ordered repeat  Patient received IV fluid, lactic acidosis resolved  Monitoring.     Abnormal TSH  Assessment & Plan  Will check t4 levels.     Delirium  Assessment & Plan  hyperverbal and tangential. Unable to answer questions appropriately. Need frequent redirection. Unclear etiology, due to Valium?  I have consulted psych, will decrease valium to 5mg iv bid prn  Check B12, B1, TSH  Appreciated psych recommendations.     Discussed with psych team.  F/u t4 levels.     Hypophosphatemia  Assessment & Plan  Replaced as indicated with iv kphos  Replacing with iv phos.     Protein calorie malnutrition (HCC)  Assessment & Plan  Severe abdomina pain, nausea and vomiting, unable to oral diet for 1 to 2 weeks  I have ordered boost  Dietitian consult  Discussed with patient regaring plan of care, discussed probably tube feeding PEG, would like dilatation first.       Abdominal pain  Assessment & Plan  Diffuse abdominal pain, nausea and vomiting in the setting of Crohn disease, status post colectomy  CT abdomen unremarkable for acute pathology.  ESR/CRP negative.  Denies bloody stool  Unclear etiology  Refused to take p.o. medications and diet. Requested iv dilaudid   GI rec caloproectin and speech evaluation, which are ordered  Multimodal pain managements including po and iv narcotics prn. Monitoring respiratory status and sedation score    1015: Patient reports abdominal pain,  nausea and vomiting  Refuse to take po meds  She is able to take some of po diet  Pending esophagram   10/16: She reports significant abdominal pain, nausea and vomiting. Refused to take po meds  Esophagram reviewed  I have discussed with GI  Minimize narcotics use.     Continue c/o abdominal pain. Discussed with GI, they are recommending f/u as outpatient with tertiary center.       Hypomagnesemia- (present on admission)  Assessment & Plan  Replaced with iv mag  Replacing with iv mag today.       Essential hypertension- (present on admission)  Assessment & Plan  Continue metoprolol, holding diuretics in setting of dehydration    Tachycardia  Assessment & Plan  Has been persistently in 110's  In setting of dehydration  Have ordered twelve-lead EKG to further assess  Monitoring.       Hypokalemia  Assessment & Plan  Refused po supplements  I have ordered iv kcl replacement  Monitoring.     Dehydration  Assessment & Plan  With decreased urine output as well as decreased ostomy output  In setting of nausea and vomiting  IV fluids, monitor BMP and urine output, has started to make dark urine after initial IV fluid resuscitation  Antiemetics    Gently Iv fluids.    Crohn's disease of small intestine with complication (HCC)- (present on admission)  Assessment & Plan  Has ostomy in place.  Follows with Dr. Davalos per patient  Recently completed Medrol Dosepak, will hold off on further steroids as unclear that there is current Crohn's flare given no ostomy output or findings of inflammation on CT abdomen  Check inflammatory markers ESR, CRP negative  No bloody bowel movement or obstruction, chance of acute Crohn small bowel flare is not high.   Ordered calprotectin fecal, patient refusing stool analysis.   Consulted GI, discussed with GI           VTE prophylaxis: heparin    I have performed a physical exam and reviewed and updated ROS and Plan today (10/17/2023). In review of yesterday's note (10/16/2023), there are no  changes except as documented above.    Greater than 51 minutes spent prepping to see patient (e.g. reviewing  tests/imaging results, notes from consultants, bedside nurse, night shift ) obtaining and/or reviewing separately obtained history. Performing a medically appropriate examination and evaluation.  Counseling and educating the patient.  Ordering medications, tests, or procedures.  Referring and communicating with other health care professionals.  Documenting clinical information in EPIC.  Independently interpreting results and communicating results to patient.  Care coordination.

## 2023-10-18 PROBLEM — K22.0 ACHALASIA: Status: ACTIVE | Noted: 2023-10-18

## 2023-10-18 LAB
ANION GAP SERPL CALC-SCNC: 11 MMOL/L (ref 7–16)
BACTERIA BLD CULT: NORMAL
BACTERIA BLD CULT: NORMAL
BUN SERPL-MCNC: 11 MG/DL (ref 8–22)
CALCIUM SERPL-MCNC: 9.6 MG/DL (ref 8.5–10.5)
CHLORIDE SERPL-SCNC: 106 MMOL/L (ref 96–112)
CO2 SERPL-SCNC: 27 MMOL/L (ref 20–33)
CREAT SERPL-MCNC: 1.05 MG/DL (ref 0.5–1.4)
GFR SERPLBLD CREATININE-BSD FMLA CKD-EPI: 57 ML/MIN/1.73 M 2
GLUCOSE SERPL-MCNC: 103 MG/DL (ref 65–99)
MAGNESIUM SERPL-MCNC: 2.3 MG/DL (ref 1.5–2.5)
PHOSPHATE SERPL-MCNC: 2.6 MG/DL (ref 2.5–4.5)
POTASSIUM SERPL-SCNC: 3.6 MMOL/L (ref 3.6–5.5)
SIGNIFICANT IND 70042: NORMAL
SIGNIFICANT IND 70042: NORMAL
SITE SITE: NORMAL
SITE SITE: NORMAL
SODIUM SERPL-SCNC: 144 MMOL/L (ref 135–145)
SOURCE SOURCE: NORMAL
SOURCE SOURCE: NORMAL
T3FREE SERPL-MCNC: 2.57 PG/ML (ref 2–4.4)

## 2023-10-18 PROCEDURE — 700111 HCHG RX REV CODE 636 W/ 250 OVERRIDE (IP): Performed by: HOSPITALIST

## 2023-10-18 PROCEDURE — 99233 SBSQ HOSP IP/OBS HIGH 50: CPT | Performed by: HOSPITALIST

## 2023-10-18 PROCEDURE — 84481 FREE ASSAY (FT-3): CPT

## 2023-10-18 PROCEDURE — 36415 COLL VENOUS BLD VENIPUNCTURE: CPT

## 2023-10-18 PROCEDURE — 700111 HCHG RX REV CODE 636 W/ 250 OVERRIDE (IP): Performed by: STUDENT IN AN ORGANIZED HEALTH CARE EDUCATION/TRAINING PROGRAM

## 2023-10-18 PROCEDURE — 700102 HCHG RX REV CODE 250 W/ 637 OVERRIDE(OP): Performed by: STUDENT IN AN ORGANIZED HEALTH CARE EDUCATION/TRAINING PROGRAM

## 2023-10-18 PROCEDURE — G0378 HOSPITAL OBSERVATION PER HR: HCPCS

## 2023-10-18 PROCEDURE — 700111 HCHG RX REV CODE 636 W/ 250 OVERRIDE (IP): Mod: JZ | Performed by: STUDENT IN AN ORGANIZED HEALTH CARE EDUCATION/TRAINING PROGRAM

## 2023-10-18 PROCEDURE — 84100 ASSAY OF PHOSPHORUS: CPT

## 2023-10-18 PROCEDURE — 700105 HCHG RX REV CODE 258: Performed by: STUDENT IN AN ORGANIZED HEALTH CARE EDUCATION/TRAINING PROGRAM

## 2023-10-18 PROCEDURE — 99231 SBSQ HOSP IP/OBS SF/LOW 25: CPT | Performed by: STUDENT IN AN ORGANIZED HEALTH CARE EDUCATION/TRAINING PROGRAM

## 2023-10-18 PROCEDURE — 83993 ASSAY FOR CALPROTECTIN FECAL: CPT

## 2023-10-18 PROCEDURE — A9270 NON-COVERED ITEM OR SERVICE: HCPCS | Performed by: STUDENT IN AN ORGANIZED HEALTH CARE EDUCATION/TRAINING PROGRAM

## 2023-10-18 PROCEDURE — 80048 BASIC METABOLIC PNL TOTAL CA: CPT

## 2023-10-18 PROCEDURE — 700111 HCHG RX REV CODE 636 W/ 250 OVERRIDE (IP)

## 2023-10-18 PROCEDURE — 83735 ASSAY OF MAGNESIUM: CPT

## 2023-10-18 RX ORDER — DIAZEPAM 5 MG/ML
5 INJECTION, SOLUTION INTRAMUSCULAR; INTRAVENOUS EVERY 8 HOURS PRN
Status: DISCONTINUED | OUTPATIENT
Start: 2023-10-18 | End: 2023-10-28 | Stop reason: HOSPADM

## 2023-10-18 RX ORDER — DIAZEPAM 5 MG/ML
5 INJECTION, SOLUTION INTRAMUSCULAR; INTRAVENOUS ONCE
Status: COMPLETED | OUTPATIENT
Start: 2023-10-18 | End: 2023-10-18

## 2023-10-18 RX ADMIN — NYSTATIN 500000 UNITS: 100000 SUSPENSION ORAL at 13:08

## 2023-10-18 RX ADMIN — HYDROMORPHONE HYDROCHLORIDE 0.5 MG: 1 INJECTION, SOLUTION INTRAMUSCULAR; INTRAVENOUS; SUBCUTANEOUS at 23:42

## 2023-10-18 RX ADMIN — DIAZEPAM 5 MG: 5 INJECTION, SOLUTION INTRAMUSCULAR; INTRAVENOUS at 06:01

## 2023-10-18 RX ADMIN — DIAZEPAM 5 MG: 5 INJECTION, SOLUTION INTRAMUSCULAR; INTRAVENOUS at 01:29

## 2023-10-18 RX ADMIN — HYDROMORPHONE HYDROCHLORIDE 1 MG: 1 INJECTION, SOLUTION INTRAMUSCULAR; INTRAVENOUS; SUBCUTANEOUS at 00:11

## 2023-10-18 RX ADMIN — HYDROMORPHONE HYDROCHLORIDE 0.5 MG: 1 INJECTION, SOLUTION INTRAMUSCULAR; INTRAVENOUS; SUBCUTANEOUS at 11:32

## 2023-10-18 RX ADMIN — DIAZEPAM 5 MG: 5 INJECTION, SOLUTION INTRAMUSCULAR; INTRAVENOUS at 18:28

## 2023-10-18 RX ADMIN — ONDANSETRON 4 MG: 2 INJECTION INTRAMUSCULAR; INTRAVENOUS at 23:53

## 2023-10-18 RX ADMIN — NYSTATIN 500000 UNITS: 100000 SUSPENSION ORAL at 17:16

## 2023-10-18 RX ADMIN — VALPROATE SODIUM 500 MG: 100 INJECTION, SOLUTION INTRAVENOUS at 17:16

## 2023-10-18 ASSESSMENT — ENCOUNTER SYMPTOMS
PND: 0
VOMITING: 0
DEPRESSION: 0
BACK PAIN: 0
HEADACHES: 0
WHEEZING: 0
DIZZINESS: 0
HEARTBURN: 0
ABDOMINAL PAIN: 1
BLURRED VISION: 0
MYALGIAS: 0
NAUSEA: 0
HEMOPTYSIS: 0
CHILLS: 0
COUGH: 0
DOUBLE VISION: 0
BRUISES/BLEEDS EASILY: 0
CLAUDICATION: 0
PALPITATIONS: 0
FEVER: 0

## 2023-10-18 ASSESSMENT — PAIN DESCRIPTION - PAIN TYPE: TYPE: ACUTE PAIN;CHRONIC PAIN

## 2023-10-18 NOTE — PROGRESS NOTES
Patient refused to have a bed alarm, and treaded hospital socks today. She states an inability to take PO meds so those have been marked as refused as well. After a PRN 5mg diazepam dose, patient fell asleep at the nurses station today. Although medication was given over 5 minutes, the dose seemed to have a very sedating effect on the patient. Safety was reiterated with her and she still refused safety interventions.

## 2023-10-18 NOTE — PROGRESS NOTES
Patient is alert and verbally aggressive this am.  Irritated that staff wasn't in her room when  arrived this am.

## 2023-10-18 NOTE — CARE PLAN
Pt Aox4, yet confused, tangential, and with seemingly paranoid delusions. Pt refused PO medication and bed alarms. Pt c/o pain that was not relieve with 0.5 mg dilaudid; one time order of 1 mg dilaudid ordered which made pain tolerable. Pt fixated on people talking about her, stealing from her and generally being victimized. Muscle spasms relieved with valium.    Problem: Pain - Standard  Goal: Alleviation of pain or a reduction in pain to the patient’s comfort goal  Outcome: Progressing     Problem: Knowledge Deficit - Standard  Goal: Patient and family/care givers will demonstrate understanding of plan of care, disease process/condition, diagnostic tests and medications  Outcome: Progressing     Problem: Psychosocial  Goal: Patient's level of anxiety will decrease  Outcome: Progressing  Goal: Patient's ability to verbalize feelings about condition will improve  Outcome: Progressing  Goal: Patient's ability to re-evaluate and adapt role responsibilities will improve  Outcome: Progressing  Goal: Patient and family will demonstrate ability to cope with life altering diagnosis and/or procedure  Outcome: Progressing  Goal: Spiritual and cultural needs incorporated into hospitalization  Outcome: Progressing     Problem: Discharge Barriers/Planning  Goal: Patient's continuum of care needs are met  Outcome: Progressing     Problem: Urinary Elimination  Goal: Establish and maintain regular urinary output  Outcome: Progressing     Problem: Skin Integrity  Goal: Skin integrity is maintained or improved  Outcome: Progressing     Problem: Fall Risk  Goal: Patient will remain free from falls  Outcome: Progressing   The patient is Watcher - Medium risk of patient condition declining or worsening    Shift Goals  Clinical Goals: vss, free of paranoid delusions  Patient Goals: Sleep and rest  Family Goals: DALTON    Progress made toward(s) clinical / shift goals:      Patient is not progressing towards the following goals:

## 2023-10-18 NOTE — CONSULTS
"PSYCHIATRIC FOLLOW-UP:(established)  *Reason for admission:    Nausea, Vomiting, abdominal pain, poor PO intake  *Legal Hold Status:       Hold initiated today  Reason for consult: Possible Bipolar Disorder  Chart reviewed.            *HPI:          This morning the patient reports she is irritated because \"out of the goodness of my heart I took these students in, because the top lady here, a lady in a wheelchair asked me too and now I have psychology on my back.\" Patient continues to frequently discuss topic not relevant to our conversation during this encounter. She would become irritable if redirected. Of note she still remains fully oriented and does have a fairly accurate recollection of why she is here and what treatments she has been receiving. She still believes that she cannot swallow anything but miniscule amounts of food (less than 0.5 cm in length) and cannot swallow even liquid medications because \"Dr. Davalos told me not to and it will get stuck and destroy my esophagus.\" Patient will not accept explanations that although she has narrowing and dysmotility in her esophagus she can have soft/liquid meals per  and they have not prohibited her from having PO medications. She denies SI/HI. No hallucinations but she does have paranoid delusions that there is a camera system in the heating vents of this hospital that are spying on her.    Patient was informed that a legal hold has been placed due to concerns that she is unable to care for self since she will not take PO meds or much PO intake. She was informed that she has a right to speak with a  to contest the hold and that the hold can be discontinued at any time If she can demonstrate ability to care for self.    Reviewed nursing notes and spoke with nursing staff. The patient continues to refuse all PO medications. She has minimal PO intake. She is irritable and paranoid. She is fixated on people talking about her and stealing her " belongings. She refuses to have a bed alarm though she is a fall risk    Medical ROS (as pertinent):     Review of Systems   Constitutional: Negative.    HENT: (+) mild HA due to alarm from her IV pump as IV pump had low battery. I plugged in the pump, alarm stopped and there were no further complaints  Eyes: Negative.    Respiratory: Negative.     Cardiovascular: Negative.    Gastrointestinal:  Negative for nausea this AM  Genitourinary: Negative.              *Psychiatric Examination:  Vitals:    10/18/23 0317   BP: 139/74   Pulse: 100   Resp: 18   Temp: 36.7 °C (98 °F)   SpO2: 99%       General Appearance: 68-year-old female, appears stated age, wearing hospital garb, demanding, intrusive  Abnormal Movements: No  tics, tremors or dyskinesias. No dystonia  Gait and Posture: gait appears steady  Speech: Pressured, hyperverbal, normal volume.  Thought processes: Circumstantial, disorganized, not logical, grandiose  Associations: Prominent loose associations  Abnormal or Psychotic Thoughts: No evidence of AVH, has paranoid and grandiose delusions as detailed above  Judgement and Insight: Poor/poor  Orientation: Oriented to place, person, time, situation  Recent and Remote Memory: Grossly intact although difficult to fully assess due to switching topics frequently  Attention Span and Concentration: distractible  Language: Fluent in English  Fund of Knowledge: Grossly appropriate for age  Mood and Affect: Elevated, irritable, labile   SI/HI: Denies/denies                            EKG: QTc 482 on 10/13/2023  Brain Imaging: None  EEG: None       *Labs personally reviewed:   Lab Results   Component Value Date/Time    SODIUM 144 10/18/2023 03:11 AM    POTASSIUM 3.6 10/18/2023 03:11 AM    CHLORIDE 106 10/18/2023 03:11 AM    CO2 27 10/18/2023 03:11 AM    GLUCOSE 103 (H) 10/18/2023 03:11 AM    BUN 11 10/18/2023 03:11 AM    CREATININE 1.05 10/18/2023 03:11 AM    CREATININE 0.89 02/10/2010 04:04 PM    BUNCREATRAT 17  02/10/2010 04:04 PM    GLOMRATE >59 02/10/2010 04:04 PM      Free T4 (10/17/23) 1.54     Current medications:    Current Facility-Administered Medications:     oxyCODONE immediate-release (Roxicodone) tablet 5 mg, 5 mg, Oral, Q3HRS PRN **OR** oxyCODONE immediate release (Roxicodone) tablet 10 mg, 10 mg, Oral, Q3HRS PRN **OR** HYDROmorphone (Dilaudid) injection 0.5 mg, 0.5 mg, Intravenous, Q8HRS PRN, Rod Valencia M.D., 0.5 mg at 10/17/23 2231    phosphorus (K-Phos-Neutral) per tablet 250 mg, 250 mg, Oral, Q6HRS, Rod Valencia M.D.    valproate (Depacon) 500 mg in dextrose 5% 50 mL IVPB, 500 mg, Intravenous, Q EVENING, Kim Akers D.O., Stopped at 10/17/23 2304    diazePAM (Valium) injection 5 mg, 5 mg, Intravenous, Q12HRS PRN, Milka Westfall M.D., 5 mg at 10/18/23 0601    heparin injection 5,000 Units, 5,000 Units, Subcutaneous, Q8HRS, Leonel Rodriguez M.D., 5,000 Units at 10/17/23 2203    Pharmacy Consult Request ...Pain Management Review 1 Each, 1 Each, Other, PHARMACY TO DOSE, Leonel Rodriguez M.D.    [] acetaminophen (Tylenol) tablet 650 mg, 650 mg, Oral, Q6HRS, 650 mg at 10/13/23 0613 **FOLLOWED BY** acetaminophen (Tylenol) tablet 650 mg, 650 mg, Oral, Q6HRS PRN, Leonel Rodriguez M.D.    ondansetron (Zofran) syringe/vial injection 4 mg, 4 mg, Intravenous, Q4HRS PRN, Leonel Rodriguez M.D., 4 mg at 10/17/23 1022    hydroxychloroquine (Plaquenil) tablet 300 mg, 300 mg, Oral, DAILY, Leonel Rodriguez M.D.    levalbuterol (Xopenex) 0.63 MG/3ML nebulizer solution 0.63 mg, 3 mL, Inhalation, Q4HRS PRN, Leonel Rodriguez M.D.    metoprolol tartrate (Lopressor) tablet 50 mg, 50 mg, Oral, BID, Leonel Rodriguez M.D.    nystatin (Mycostatin) [id number] UNIT/ML suspension 500,000 Units, 5 mL, Swish & Spit, 4X/DAY, Milka Westfall M.D., 500,000 Units at 10/17/23 3273       Assessment:  This is a 68-year-old female with past history symptoms consistent with kely, unspecified anxiety and  OC PD (all by history from chart review) hospitalized for lactic acidosis.  Patient appears to no longer be meeting criteria for delirium after the escalating Valium and Dilaudid use.  She currently meets criteria for acute kely as patient is grandiose, talkative, has pressured speech, not sleeping, irritable, easily distractible, mood lability present.  At this time she would benefit from Depakote to address kely symptoms as well as nausea and vomiting concerns.  Additionally, Depakote can be administered IV. Depakote will continue to be titrated up as tolerated    Patient placed on a legal hold this morning due to inability to care for self     Dx:     #Bipolar disorder (by history), currently manic  #Delirium, current (resolved)     Medical :  See medical note        Plan:  1- Legal hold:hold initiated today  2- Psychotropic medications              *Continue depakote 500 mg IV QHS. Willl likely increase tomorrow if med continues to be tolerated today  3- Labs ordered/reviewed: Reviewed, recommend repeating TSH with T4, T3.  - Discussed the case with: Dr. Law, Dr. Valencia (via Vo[employer]te) Aminah BRINK (via voalte)  4- Psychiatry will follow up  5. Please transfer to a psychiatric facility when patient medically cleared (able void without cath, able to take at least PO medications)    Sitter: 1:1   Personal belongings: yes  Cell phone: yes  Visitors: family only       Thank you for the consult.

## 2023-10-18 NOTE — THERAPY
Occupational Therapy Contact Note    Patient Name: Jana Overton  Age:  68 y.o., Sex:  female  Medical Record #: [id number]  Today's Date: 10/18/2023    Discussed missed therapy with Nsg       10/18/23 1313   Interdisciplinary Plan of Care Collaboration   Collaboration Comments Pt is up self in room & on the Nsg unit.  Pt has no Acute OT needs

## 2023-10-18 NOTE — ASSESSMENT & PLAN NOTE
Grade II  Per GI patient needs f/u as outpatient at tertiary center  Barium swallow eval showed:   1. Distal esophageal lumen is narrowed down to less than 5 mm, likely representing a distal esophageal stricture. It does not widen during the examination.  2.  Several areas of minimal luminal narrowing of the proximal esophagus, down to 10 mm.  3.  Moderate mid and distal esophageal dysmotility.  4.  Gastroesophageal reflux.    Per GI no plan for intervention at this time since patient appears to be at baseline and patient was able to tolerate barium for study w/o problems. No additional benefit from further EGD at this point. The patient will need help from the primary doctor to see a specialist either in Tertiary center in [place] or [place] for possible myotomy for her type II achalasia.    Patient to continue on working on her oral intake

## 2023-10-18 NOTE — CARE PLAN
The patient is Stable - Low risk of patient condition declining or worsening    Shift Goals  Clinical Goals: Patient will take oral medications  Patient Goals: Sleep and rest  Family Goals: DALTON    Progress made toward(s) clinical / shift goals:  Patient continues to refuse oral medications throughout shift    Patient is not progressing towards the following goals:      Problem: Knowledge Deficit - Standard  Goal: Patient and family/care givers will demonstrate understanding of plan of care, disease process/condition, diagnostic tests and medications  Description: Target End Date:  1-3 days or as soon as patient condition allows    Document in Patient Education    1.  Patient and family/caregiver oriented to unit, equipment, visitation policy and means for communicating concern  2.  Complete/review Learning Assessment  3.  Assess knowledge level of disease process/condition, treatment plan, diagnostic tests and medications  4.  Explain disease process/condition, treatment plan, diagnostic tests and medications  Outcome: Not Progressing     Problem: Psychosocial  Goal: Patient's ability to re-evaluate and adapt role responsibilities will improve  Description: Target End Date:  Prior to discharge or change in level of care    1.  Assess family support  2.  Encourage support system participation in care  3.  Encouraged verbalization of feelings regarding caregiver responsibilities  4.  Discuss changes in role and responsibilities caused by patient's condition  Outcome: Not Progressing

## 2023-10-18 NOTE — DIETARY
Nutrition Update:    Day 0 of admit.  Jana Overton is a 68 y.o. female with admitting DX of Lactic acid acidosis [E87.20].  Patient being followed to optimize nutrition.    Current Diet: cardiac w/ supplements    Only 3 meals documented for this stay (day 6), currently observation outpatient. All <25%. RN documented this AM pt refusing PO, medication and bed alarms. Psych eval today determined pt with bipolar disorder, currently manic. Now on legal hold. Last RD tailored pt diet to specific food items, pt continues to avoid eating. Intake likely to improve once mentation is stable. RD to continue monitoring and providing recommendations as able. Pt may benefit from nutrition support if intake does not improve in 24-48 hours.     Problem: Nutritional:  Goal: Achieve adequate nutritional intake  Description: Patient will consume >/=50% of meals  Outcome: Not progressing    RD following.

## 2023-10-18 NOTE — PROGRESS NOTES
Dr. Sultana updated that patients catheter was placed by urology prior to admission.  Per policy RN unable to remove milton without urology approval.

## 2023-10-18 NOTE — PROGRESS NOTES
"Hospital Medicine Daily Progress Note    Date of Service  10/18/2023    Chief Complaint  Jana Overton is a 68 y.o. female admitted 10/12/2023 with abdominal pain, nausea    Hospital Course  68 y.o. female who presented 10/12/2023  with urinary retention, nausea.  Patient has a history of Crohn's disease status post colectomy with ileostomy on left side, hypertension, chronic pain.  She has had 2 recent emergency room visits due to worsening of chronic abdominal pain and difficulty tolerating p.o. intake with failure of p.o. Zofran at home.  On her last ED visit she had a borderline FLORES with creatinine increased to 1.5 from baseline of 1.2.  She was discharged home after some IV fluids.  She presents again due to concern for decreased urinary output.  She had a Sage placed at urology office for unclear reason however did not drain much urine.  Kidney function stable from last week however did have elevated lactic acid to 3.9.  After some IV fluids she is making some dark urine.  Due to concern for elevated lactic acid and inability to tolerate p.o. intake admission was requested.  CT abdomen unremarkable  Lactic acid 3.9 on admission, which has been resolved  ESR, CRP negative    Interval Problem Update  Seen patient at bedside  Patient is very hyperverbal and tangential.   Unable to answer questions appropriately  She refused to take po meds. State po meds will make her nausea, vomiting  However she is able to take po diet, however per patient she can only take one bite every 10 mins  She reports significant abdominal pain and leg cram. She has been talking in the room continuously. No sign of severe pain. Asking dialudid and valium iv. She states \"I used to inject iv dilaudid 4mg every 2h to my chest.\" \"I took valium 5mg for muscle spasm. If not working in 20 mins, I took another 5mg.\"    Paranoid and delirium  I have spoke to psychiatry, concerning delirium due to   I have spoke to psychiatry  Concerning " delirium due to Valium, I have reduced valium to 5mg bid prn  Ordered VitB1, B12, TSH  Discussed with GI      10/17 in bed, patient is new to me today, discussed with GI and psych team, continue supportive treatment will start depakote per psych recommendation, iv fluids, close monitoring. F/u t4 level.   10/18 patient is up and walking, legal hold stared per psych, I had long conversation with patient regarding plan of care, I discussed with GI and per GI team no plan for intervention during this admission, patient continues to refuse to eat or take po medications, I asked patient how she does at home and she said she is able to take meds at home w/o problems, I told her we need to try here in the hospital to see if she is able to tolerate po meds and foot and she refused she said she knows when she get back home she will be able to take po but not here in the hospital, she is asking for more iv diazepam and dilaudid.     I have discussed this patient's plan of care and discharge plan at IDT rounds today with Case Management, Nursing, Nursing leadership, and other members of the IDT team.    Consultants/Specialty  GI  Psych         Code Status  Full Code    Disposition  The patient is not medically cleared for discharge to home or a post-acute facility.      I have placed the appropriate orders for post-discharge needs.    Review of Systems  Review of Systems   Constitutional:  Negative for chills and fever.   Eyes:  Negative for blurred vision and double vision.   Respiratory:  Negative for cough, hemoptysis and wheezing.    Cardiovascular:  Negative for chest pain, palpitations, claudication, leg swelling and PND.   Gastrointestinal:  Positive for abdominal pain. Negative for heartburn, nausea and vomiting.   Genitourinary:  Negative for hematuria and urgency.   Musculoskeletal:  Negative for back pain and myalgias.   Skin:  Negative for rash.   Neurological:  Negative for dizziness and headaches.    Endo/Heme/Allergies:  Does not bruise/bleed easily.   Psychiatric/Behavioral:  Negative for depression.         Physical Exam  Temp:  [35.8 °C (96.5 °F)-36.7 °C (98 °F)] 35.8 °C (96.5 °F)  Pulse:  [100-117] 117  Resp:  [18-19] 19  BP: (117-148)/(57-75) 117/67  SpO2:  [95 %-99 %] 95 %    Physical Exam  Vitals and nursing note reviewed.   Constitutional:       Appearance: Normal appearance. She is ill-appearing.   HENT:      Head: Normocephalic and atraumatic.      Nose: Nose normal.      Mouth/Throat:      Pharynx: Oropharynx is clear.   Eyes:      General: No scleral icterus.  Cardiovascular:      Rate and Rhythm: Normal rate and regular rhythm.      Pulses: Normal pulses.      Heart sounds: Normal heart sounds.   Pulmonary:      Effort: Pulmonary effort is normal.      Breath sounds: Normal breath sounds.   Abdominal:      General: Abdomen is flat. Bowel sounds are normal. There is no distension.      Palpations: Abdomen is soft.      Tenderness: There is abdominal tenderness. There is no guarding.      Comments: Colostomy bag in place   Musculoskeletal:         General: Normal range of motion.      Cervical back: Normal range of motion and neck supple.   Skin:     General: Skin is warm and dry.   Neurological:      General: No focal deficit present.      Mental Status: She is alert and oriented to person, place, and time. Mental status is at baseline.   Psychiatric:      Comments: Anxious  Tangential thought process         Fluids  No intake or output data in the 24 hours ending 10/18/23 1510      Laboratory  Recent Labs     10/17/23  0951   WBC 8.0   RBC 4.04*   HEMOGLOBIN 12.4   HEMATOCRIT 37.6   MCV 93.1   MCH 30.7   MCHC 33.0   RDW 43.3   PLATELETCT 157*   MPV 10.7       Recent Labs     10/17/23  0800 10/18/23  0311   SODIUM 142 144   POTASSIUM 3.8 3.6   CHLORIDE 101 106   CO2 20 27   GLUCOSE 78 103*   BUN 13 11   CREATININE 1.33 1.05   CALCIUM 10.1 9.6                     Imaging  IR-US GUIDED PIV   Final  Result    Ultrasound-guided PERIPHERAL IV INSERTION performed by    qualified nursing staff as above.      IR-US GUIDED PIV   Final Result    Ultrasound-guided PERIPHERAL IV INSERTION performed by    qualified nursing staff as above.      DX-ESOPHAGUS BARIUM SWALLOW SINGLE CONTRAST   Final Result      1.  Distal esophageal lumen is narrowed down to less than 5 mm, likely representing a distal esophageal stricture. It does not widen during the examination.   2.  Several areas of minimal luminal narrowing of the proximal esophagus, down to 10 mm.   3.  Moderate mid and distal esophageal dysmotility.   4.  Gastroesophageal reflux.      CT-ABDOMEN-PELVIS WITH   Final Result      Prior colectomy with LEFT lower quadrant ileostomy   Catheter in place in the urinary bladder. The tip abuts the bladder dome.              Assessment/Plan  * Lactic acid acidosis- (present on admission)  Assessment & Plan  3.9 on presentation  In setting of dehydration, nausea/vomiting  IV fluid resuscitation  Trend lactic acid, have ordered repeat  Patient received IV fluid, lactic acidosis resolved  Monitoring.     Achalasia- (present on admission)  Assessment & Plan  Grade II  Per GI patient needs f/u as outpatient at tertiary center  Barium swallow eval showed:   1. Distal esophageal lumen is narrowed down to less than 5 mm, likely representing a distal esophageal stricture. It does not widen during the examination.  2.  Several areas of minimal luminal narrowing of the proximal esophagus, down to 10 mm.  3.  Moderate mid and distal esophageal dysmotility.  4.  Gastroesophageal reflux.    Per GI no plan for intervention at this time since patient appears to be at baseline and patient was able to tolerate barium for study w/o problems.     Abnormal TSH  Assessment & Plan  Normal t4.   borderline low tsh.   Check t3.     Delirium  Assessment & Plan  hyperverbal and tangential. Unable to answer questions appropriately. Need frequent  redirection. Unclear etiology, due to Valium?  I have consulted psych, will decrease valium to 5mg iv bid prn  Check B12, B1, TSH  Appreciated psych recommendations.     Discussed with psych team.  F/u t3, t4 is normal.   depakote iv qhs per psych.     Hypophosphatemia  Assessment & Plan  Replaced as indicated with iv kphos  Replacing with iv phos.     Protein calorie malnutrition (HCC)  Assessment & Plan  Severe abdomina pain, nausea and vomiting, unable to oral diet for 1 to 2 weeks  I have ordered boost  Dietitian consult  Discussed with patient regaring plan of care, discussed probably tube feeding PEG, would like dilatation first.       Abdominal pain  Assessment & Plan  Diffuse abdominal pain, nausea and vomiting in the setting of Crohn disease, status post colectomy  CT abdomen unremarkable for acute pathology.  ESR/CRP negative.  Denies bloody stool  Unclear etiology  Refused to take p.o. medications and diet. Requested iv dilaudid   GI rec caloproectin and speech evaluation, which are ordered  Multimodal pain managements including po and iv narcotics prn. Monitoring respiratory status and sedation score    1015: Patient reports abdominal pain, nausea and vomiting  Refuse to take po meds  She is able to take some of po diet  Pending esophagram   10/16: She reports significant abdominal pain, nausea and vomiting. Refused to take po meds  Esophagram reviewed  I have discussed with GI  Minimize narcotics use.     Continue c/o abdominal pain. Discussed with GI, they are recommending f/u as outpatient with tertiary center.   C/o abdominal pain but she is ambulating w/o any signs of pain or distress. Ostomy working.       Hypomagnesemia- (present on admission)  Assessment & Plan  Replaced   F/u mg in am.       Essential hypertension- (present on admission)  Assessment & Plan  Continue metoprolol, holding diuretics in setting of dehydration  Monitoring.     Tachycardia  Assessment & Plan  Has been persistently in  110's  In setting of dehydration  Have ordered twelve-lead EKG to further assess  Monitoring.       Hypokalemia  Assessment & Plan  Refused po supplements  I have ordered iv kcl replacement  Monitoring.   F/u labs in am,     Dehydration  Assessment & Plan  With decreased urine output as well as decreased ostomy output  In setting of nausea and vomiting  IV fluids, monitor BMP and urine output, has started to make dark urine after initial IV fluid resuscitation  Antiemetics    Gently Iv fluids.  Needs to drink fluids  As per GI patient was able to drink contrast for barium eval w/o problems.     Crohn's disease of small intestine with complication (HCC)- (present on admission)  Assessment & Plan  Has ostomy in place.  Follows with Dr. Davalos per patient  Recently completed Medrol Dosepak, will hold off on further steroids as unclear that there is current Crohn's flare given no ostomy output or findings of inflammation on CT abdomen  Check inflammatory markers ESR, CRP negative  No bloody bowel movement or obstruction, chance of acute Crohn small bowel flare is not high.   Ordered calprotectin fecal, patient refusing stool analysis.   Consulted GI, discussed with GI  calprotectin pending.   Discussed with GI today, no intervention planned.            VTE prophylaxis: heparin    I have performed a physical exam and reviewed and updated ROS and Plan today (10/18/2023). In review of yesterday's note (10/17/2023), there are no changes except as documented above.    Greater than 55 minutes spent prepping to see patient (e.g. reviewing  tests/imaging results, notes from consultants, bedside nurse, night shift ) obtaining and/or reviewing separately obtained history. Performing a medically appropriate examination and evaluation.  Counseling and educating the patient.  Ordering medications, tests, or procedures.  Referring and communicating with other health care professionals.  Documenting clinical information in EPIC.   Independently interpreting results and communicating results to patient.  Care coordination.

## 2023-10-19 ENCOUNTER — TELEPHONE (OUTPATIENT)
Dept: CARDIOLOGY | Facility: MEDICAL CENTER | Age: 68
End: 2023-10-19
Payer: MEDICARE

## 2023-10-19 LAB
ANION GAP SERPL CALC-SCNC: 13 MMOL/L (ref 7–16)
BUN SERPL-MCNC: 12 MG/DL (ref 8–22)
CALCIUM SERPL-MCNC: 9.7 MG/DL (ref 8.5–10.5)
CHLORIDE SERPL-SCNC: 104 MMOL/L (ref 96–112)
CO2 SERPL-SCNC: 26 MMOL/L (ref 20–33)
CREAT SERPL-MCNC: 1.01 MG/DL (ref 0.5–1.4)
ERYTHROCYTE [DISTWIDTH] IN BLOOD BY AUTOMATED COUNT: 44.4 FL (ref 35.9–50)
GFR SERPLBLD CREATININE-BSD FMLA CKD-EPI: 60 ML/MIN/1.73 M 2
GLUCOSE SERPL-MCNC: 102 MG/DL (ref 65–99)
HCT VFR BLD AUTO: 39.8 % (ref 37–47)
HGB BLD-MCNC: 12.6 G/DL (ref 12–16)
MAGNESIUM SERPL-MCNC: 1.8 MG/DL (ref 1.5–2.5)
MCH RBC QN AUTO: 30.1 PG (ref 27–33)
MCHC RBC AUTO-ENTMCNC: 31.7 G/DL (ref 32.2–35.5)
MCV RBC AUTO: 95.2 FL (ref 81.4–97.8)
PHOSPHATE SERPL-MCNC: 2.3 MG/DL (ref 2.5–4.5)
PLATELET # BLD AUTO: 165 K/UL (ref 164–446)
PMV BLD AUTO: 11.3 FL (ref 9–12.9)
POTASSIUM SERPL-SCNC: 3.4 MMOL/L (ref 3.6–5.5)
RBC # BLD AUTO: 4.18 M/UL (ref 4.2–5.4)
SODIUM SERPL-SCNC: 143 MMOL/L (ref 135–145)
WBC # BLD AUTO: 7.3 K/UL (ref 4.8–10.8)

## 2023-10-19 PROCEDURE — 80048 BASIC METABOLIC PNL TOTAL CA: CPT

## 2023-10-19 PROCEDURE — 85027 COMPLETE CBC AUTOMATED: CPT

## 2023-10-19 PROCEDURE — 700102 HCHG RX REV CODE 250 W/ 637 OVERRIDE(OP): Performed by: STUDENT IN AN ORGANIZED HEALTH CARE EDUCATION/TRAINING PROGRAM

## 2023-10-19 PROCEDURE — G0378 HOSPITAL OBSERVATION PER HR: HCPCS

## 2023-10-19 PROCEDURE — 99232 SBSQ HOSP IP/OBS MODERATE 35: CPT | Performed by: HOSPITALIST

## 2023-10-19 PROCEDURE — A9270 NON-COVERED ITEM OR SERVICE: HCPCS | Performed by: STUDENT IN AN ORGANIZED HEALTH CARE EDUCATION/TRAINING PROGRAM

## 2023-10-19 PROCEDURE — 84100 ASSAY OF PHOSPHORUS: CPT

## 2023-10-19 PROCEDURE — 700105 HCHG RX REV CODE 258: Performed by: STUDENT IN AN ORGANIZED HEALTH CARE EDUCATION/TRAINING PROGRAM

## 2023-10-19 PROCEDURE — 700111 HCHG RX REV CODE 636 W/ 250 OVERRIDE (IP): Mod: JZ | Performed by: HOSPITALIST

## 2023-10-19 PROCEDURE — 99231 SBSQ HOSP IP/OBS SF/LOW 25: CPT | Mod: GC | Performed by: STUDENT IN AN ORGANIZED HEALTH CARE EDUCATION/TRAINING PROGRAM

## 2023-10-19 PROCEDURE — 700111 HCHG RX REV CODE 636 W/ 250 OVERRIDE (IP): Performed by: STUDENT IN AN ORGANIZED HEALTH CARE EDUCATION/TRAINING PROGRAM

## 2023-10-19 PROCEDURE — 36415 COLL VENOUS BLD VENIPUNCTURE: CPT

## 2023-10-19 PROCEDURE — 83735 ASSAY OF MAGNESIUM: CPT

## 2023-10-19 RX ADMIN — VALPROATE SODIUM 500 MG: 100 INJECTION, SOLUTION INTRAVENOUS at 17:29

## 2023-10-19 RX ADMIN — ONDANSETRON 4 MG: 2 INJECTION INTRAMUSCULAR; INTRAVENOUS at 13:06

## 2023-10-19 RX ADMIN — ONDANSETRON 4 MG: 2 INJECTION INTRAMUSCULAR; INTRAVENOUS at 06:42

## 2023-10-19 RX ADMIN — HYDROMORPHONE HYDROCHLORIDE 0.5 MG: 1 INJECTION, SOLUTION INTRAMUSCULAR; INTRAVENOUS; SUBCUTANEOUS at 22:06

## 2023-10-19 RX ADMIN — HEPARIN SODIUM 5000 UNITS: 5000 INJECTION, SOLUTION INTRAVENOUS; SUBCUTANEOUS at 22:10

## 2023-10-19 RX ADMIN — HEPARIN SODIUM 5000 UNITS: 5000 INJECTION, SOLUTION INTRAVENOUS; SUBCUTANEOUS at 04:42

## 2023-10-19 RX ADMIN — NYSTATIN 500000 UNITS: 100000 SUSPENSION ORAL at 22:10

## 2023-10-19 RX ADMIN — HYDROMORPHONE HYDROCHLORIDE 0.5 MG: 1 INJECTION, SOLUTION INTRAMUSCULAR; INTRAVENOUS; SUBCUTANEOUS at 10:38

## 2023-10-19 RX ADMIN — DIAZEPAM 5 MG: 5 INJECTION, SOLUTION INTRAMUSCULAR; INTRAVENOUS at 15:15

## 2023-10-19 RX ADMIN — DIAZEPAM 5 MG: 5 INJECTION, SOLUTION INTRAMUSCULAR; INTRAVENOUS at 04:26

## 2023-10-19 RX ADMIN — NYSTATIN 500000 UNITS: 100000 SUSPENSION ORAL at 11:04

## 2023-10-19 RX ADMIN — HEPARIN SODIUM 5000 UNITS: 5000 INJECTION, SOLUTION INTRAVENOUS; SUBCUTANEOUS at 13:08

## 2023-10-19 RX ADMIN — NYSTATIN 500000 UNITS: 100000 SUSPENSION ORAL at 17:27

## 2023-10-19 ASSESSMENT — ENCOUNTER SYMPTOMS
WHEEZING: 0
RESPIRATORY NEGATIVE: 1
CONSTITUTIONAL NEGATIVE: 1
HEADACHES: 0
NAUSEA: 0
MYALGIAS: 0
FEVER: 0
HEADACHES: 1
BLURRED VISION: 0
VOMITING: 0
CARDIOVASCULAR NEGATIVE: 1
BACK PAIN: 0
DEPRESSION: 0
CHILLS: 0
BLURRED VISION: 1
DIZZINESS: 0
DOUBLE VISION: 0
PALPITATIONS: 0
ABDOMINAL PAIN: 1
NAUSEA: 1
VOMITING: 1
HEMOPTYSIS: 0
BRUISES/BLEEDS EASILY: 0
PND: 0
COUGH: 0
CLAUDICATION: 0
HEARTBURN: 0

## 2023-10-19 ASSESSMENT — PAIN DESCRIPTION - PAIN TYPE
TYPE: ACUTE PAIN;CHRONIC PAIN

## 2023-10-19 NOTE — CARE PLAN
The patient is Stable - Low risk of patient condition declining or worsening    Shift Goals  Clinical Goals: Patient will increase oral intake/accept oral meds, pain mgnmt, rest  Patient Goals: Rest, comfort  Family Goals: DALTON    Progress made toward(s) clinical / shift goals:  Pt medicated for pain per MAR.    Patient is not progressing towards the following goals: Pt still refusing oral medications, states being unable to consume anything except liquids.     Problem: Pain - Standard  Goal: Alleviation of pain or a reduction in pain to the patient’s comfort goal  Outcome: Progressing     Problem: Knowledge Deficit - Standard  Goal: Patient and family/care givers will demonstrate understanding of plan of care, disease process/condition, diagnostic tests and medications  Outcome: Progressing     Problem: Skin Integrity  Goal: Skin integrity is maintained or improved  Outcome: Progressing     Problem: Fall Risk  Goal: Patient will remain free from falls  Outcome: Progressing

## 2023-10-19 NOTE — CONSULTS
"PSYCHIATRIC FOLLOW-UP:(established)  *Reason for admission:      Nausea, Vomiting, abdominal pain, poor PO intake  *Legal Hold Status:         On Hold  Chart reviewed.         *HPI:            Patient was seen today, first she was sitting by the table at the nursing station but then was agreeable to go to her room for interview.  She stated her mood is \" great\" as always.  She stated she continues to deal with nausea and vomiting.  Patient remains hyperverbal with pressured speech, and is tangential to circumstantial although there is slight improvement. She can be interrupted and redirected more easily. She is less irritable. She talked about her  and her only eating organic foods, treating their to know for how \", waiting for their elk roast to come in.  She states she is eating only organic foods for her Crohn's but then said that she was raised this way with her mother in the [employer] not having a lot of money and is eating the best foods.  She then talked about \" that doctor in the wheelchair who is the head doctor here\" attributing that her mental health diagnoses have been placed in her chart by mistake.  Continuous to be grandiose, frequently talking about special treatment she is receiving or achievements she she has.  Patient continues to endorse pain and headache, she is agreeable to brain imaging at this time.  She stated she has hardware in her neck although unclear how reliable it is.    Spoke with nursing staff. Patient ate a few bites of soft food today. She has been less irritable and apologized for her behaviors last night.     Medical ROS (as pertinent):     Review of Systems   Constitutional: Negative.    HENT: Negative.     Eyes:  Positive for blurred vision.   Respiratory: Negative.     Cardiovascular: Negative.    Gastrointestinal:  Positive for nausea and vomiting.   Genitourinary: Negative.    Neurological:  Positive for headaches.           *Psychiatric Examination:  Vitals: BP (!) " "151/75 Comment: nurse aware  Pulse 98   Temp 36.1 °C (97 °F) (Temporal)   Resp 17   Ht 1.829 m (6')   Wt 68.9 kg (151 lb 14.4 oz)   SpO2 96%   BMI 20.60 kg/m²    General Appearance: 68-year-old female, presented age, wearing hospital garb, demanding, intrusive, has  moments of improved insight and apologizing for her behavior.  Abnormal Movements: No tremor, tics, dyskinesias.  No psychomotor agitation or retardation.  Gait and Posture: Slow, steady, careful.  Speech: Pressured, hyperverbal, normal volume, normal tone  Thought processes: Mostly circumstantial, at times tangential, disorganized, not logical, grandiose  Associations: Associations present  Abnormal or Psychotic Thoughts: No evidence of AVH, has paranoid and grandiose delusions  Judgement and Insight: Poor/poor  Orientation: Oriented to place, person, time, situation  Recent and Remote Memory: Grossly intact, difficult to fully assess due to distractibility and frequent switching topics and grandiosity  Attention Span and Concentration: Distractible  Language: Fluent in English  Fund of Knowledge: Grossly appropriate for age  Mood and Affect: \" Rate\", elevated, still irritable but improving, some lability still present  SI/HI: Denies      EKG: QTc 482 on 10/13/2023  Brain Imaging: None  EEG: None       *Labs personally reviewed:   Recent Results (from the past 24 hour(s))   CBC WITHOUT DIFFERENTIAL    Collection Time: 10/19/23 10:19 AM   Result Value Ref Range    WBC 7.3 4.8 - 10.8 K/uL    RBC 4.18 (L) 4.20 - 5.40 M/uL    Hemoglobin 12.6 12.0 - 16.0 g/dL    Hematocrit 39.8 37.0 - 47.0 %    MCV 95.2 81.4 - 97.8 fL    MCH 30.1 27.0 - 33.0 pg    MCHC 31.7 (L) 32.2 - 35.5 g/dL    RDW 44.4 35.9 - 50.0 fL    Platelet Count 165 164 - 446 K/uL    MPV 11.3 9.0 - 12.9 fL       Current medications:  Current Facility-Administered Medications   Medication Dose Route Frequency Provider Last Rate Last Admin    diazePAM (Valium) injection 5 mg  5 mg Intravenous " Q8HRS PRN Rod Valencia M.D.   5 mg at 10/19/23 0426    oxyCODONE immediate-release (Roxicodone) tablet 5 mg  5 mg Oral Q3HRS PRCONNER Valencia M.D.        Or    oxyCODONE immediate release (Roxicodone) tablet 10 mg  10 mg Oral Q3HRS PRCONNER Valencia M.D.        Or    HYDROmorphone (Dilaudid) injection 0.5 mg  0.5 mg Intravenous Q8HRS PRN Rod Valencia M.D.   0.5 mg at 10/19/23 1038    phosphorus (K-Phos-Neutral) per tablet 250 mg  250 mg Oral Q6HRS Rod Valencia M.D.        valproate (Depacon) 500 mg in dextrose 5% 50 mL IVPB  500 mg Intravenous Q EVENING Kim Akers D.O.   Stopped at 10/18/23 1816    heparin injection 5,000 Units  5,000 Units Subcutaneous Q8HRS Leonel Rodriguez M.D.   5,000 Units at 10/19/23 0442    Pharmacy Consult Request ...Pain Management Review 1 Each  1 Each Other PHARMACY TO DOSE Leonel Rodriguez M.D.        acetaminophen (Tylenol) tablet 650 mg  650 mg Oral Q6HRS PRN Leonel Rodriguez M.D.        ondansetron (Zofran) syringe/vial injection 4 mg  4 mg Intravenous Q4HRS PRN Leonel Rodriguez M.D.   4 mg at 10/19/23 0642    hydroxychloroquine (Plaquenil) tablet 300 mg  300 mg Oral DAILY Leonel Rodriguez M.D.        levalbuterol (Xopenex) 0.63 MG/3ML nebulizer solution 0.63 mg  3 mL Inhalation Q4HRS PRN Leonel Rodriguez M.D.        metoprolol tartrate (Lopressor) tablet 50 mg  50 mg Oral BID Leonel Rodriguez M.D.        nystatin (Mycostatin) 569710 UNIT/ML suspension 500,000 Units  5 mL Swish & Spit 4X/DAY Milka Westfall M.D.   500,000 Units at 10/19/23 1104       Assessment:  This is a 68-year-old female with past history symptoms consistent with kely, unspecified anxiety and OCPD (all by history from chart review) hospitalized for lactic acidosis.  She currently meets criteria for acute kely as patient is grandiose, talkative, has pressured speech, not sleeping, irritable, easily distractible, mood lability present.  She was  started on Depakote to address nausea, headache and vomiting; and mood. appears to be some improvement as she is less labile, slightly slower speech rate, and is less irritable today.  She has pre-existing autoimmune conditions and would benefit from brain imaging to rule out neurologic causes to her manic symptoms    At this time she continues to meet criteria for legal hold as she is still unable to care for her own needs in setting of acute mental health disorder that continues at this time.    Delirium (resolved)  Manic disorder (unspecified, rule out medical etiology)    Medical :  See medical note      Plan:  1- Legal hold: On hold  2- Psychotropic medications              *Continue depakote 500 mg IV QHS.   3- Labs ordered/reviewed: Recommend obtaining CBC, CMP, Depakote level tomorrow morning  4-recommend obtaining brain MRI if possible, head CT if MRI contraindications present (due to neck hardware)  5. Please transfer to a psychiatric facility when patient medically cleared (able void without cath, able to take at least PO medications)  6-discussed the case with:  Dr. Law, Dr. Valencia (via Vo[employer])     Sitter: 1:1   Personal belongings: yes  Cell phone: yes  Visitors: family only        Thank you for the consult.     This note was created using voice recognition software (Dragon). The accuracy of the dictation is limited by the abilities of the software. I have reviewed the note prior to signing. However, error related to voice recognition software and /or scribes may still exist and should be interpreted within the appropriate context.

## 2023-10-19 NOTE — DISCHARGE PLANNING
Medical Social Work  SW faxed Legal Hold Paper Work Page 1 and 2  Patient is medically clear to discharge, patient has milton which could be a barrier for acceptance.

## 2023-10-19 NOTE — PROGRESS NOTES
Pt refusing morning lab draws, requesting specific day time phlebotomist. AM labs rescheduled for 8 am. Pt c/o of pain to IV, declined offer for US IV, requested to be seem by VAT team instead. Education provided.

## 2023-10-19 NOTE — PROGRESS NOTES
"Hospital Medicine Daily Progress Note    Date of Service  10/19/2023    Chief Complaint  Jana Overton is a 68 y.o. female admitted 10/12/2023 with abdominal pain, nausea    Hospital Course  68 y.o. female who presented 10/12/2023  with urinary retention, nausea.  Patient has a history of Crohn's disease status post colectomy with ileostomy on left side, hypertension, chronic pain.  She has had 2 recent emergency room visits due to worsening of chronic abdominal pain and difficulty tolerating p.o. intake with failure of p.o. Zofran at home.  On her last ED visit she had a borderline FLORES with creatinine increased to 1.5 from baseline of 1.2.  She was discharged home after some IV fluids.  She presents again due to concern for decreased urinary output.  She had a Sage placed at urology office for unclear reason however did not drain much urine.  Kidney function stable from last week however did have elevated lactic acid to 3.9.  After some IV fluids she is making some dark urine.  Due to concern for elevated lactic acid and inability to tolerate p.o. intake admission was requested.  CT abdomen unremarkable  Lactic acid 3.9 on admission, which has been resolved  ESR, CRP negative    Interval Problem Update  Seen patient at bedside  Patient is very hyperverbal and tangential.   Unable to answer questions appropriately  She refused to take po meds. State po meds will make her nausea, vomiting  However she is able to take po diet, however per patient she can only take one bite every 10 mins  She reports significant abdominal pain and leg cram. She has been talking in the room continuously. No sign of severe pain. Asking dialudid and valium iv. She states \"I used to inject iv dilaudid 4mg every 2h to my chest.\" \"I took valium 5mg for muscle spasm. If not working in 20 mins, I took another 5mg.\"    Paranoid and delirium  I have spoke to psychiatry, concerning delirium due to   I have spoke to psychiatry  Concerning " delirium due to Valium, I have reduced valium to 5mg bid prn  Ordered VitB1, B12, TSH  Discussed with GI      10/17 in bed, patient is new to me today, discussed with GI and psych team, continue supportive treatment will start depakote per psych recommendation, iv fluids, close monitoring. F/u t4 level.   10/18 patient is up and walking, legal hold stared per psych, I had long conversation with patient regarding plan of care, I discussed with GI and per GI team no plan for intervention during this admission, patient continues to refuse to eat or take po medications, I asked patient how she does at home and she said she is able to take meds at home w/o problems, I told her we need to try here in the hospital to see if she is able to tolerate po meds and foot and she refused she said she knows when she get back home she will be able to take po but not here in the hospital, she is asking for more iv diazepam and dilaudid.   10/19 patient is at nurse station, no fever or chills, she is been ambulating, still hyperverbal and tangential, discussed with psych team, they are recommended MRI brain, she agree with test.     I have discussed this patient's plan of care and discharge plan at IDT rounds today with Case Management, Nursing, Nursing leadership, and other members of the IDT team.    Consultants/Specialty  GI  Psych         Code Status  Full Code    Disposition  The patient is not medically cleared for discharge to home or a post-acute facility.      I have placed the appropriate orders for post-discharge needs.    Review of Systems  Review of Systems   Constitutional:  Negative for chills and fever.   Eyes:  Negative for blurred vision and double vision.   Respiratory:  Negative for cough, hemoptysis and wheezing.    Cardiovascular:  Negative for chest pain, palpitations, claudication, leg swelling and PND.   Gastrointestinal:  Positive for abdominal pain. Negative for heartburn, nausea and vomiting.    Genitourinary:  Negative for hematuria and urgency.   Musculoskeletal:  Negative for back pain and myalgias.   Skin:  Negative for rash.   Neurological:  Negative for dizziness and headaches.   Endo/Heme/Allergies:  Does not bruise/bleed easily.   Psychiatric/Behavioral:  Negative for depression.         Physical Exam  Temp:  [36.1 °C (97 °F)-37.1 °C (98.7 °F)] 36.1 °C (97 °F)  Pulse:  [] 98  Resp:  [17-18] 17  BP: (123-151)/(66-75) 151/75  SpO2:  [94 %-96 %] 96 %    Physical Exam  Vitals and nursing note reviewed.   Constitutional:       Appearance: Normal appearance. She is ill-appearing.   HENT:      Head: Normocephalic and atraumatic.      Nose: Nose normal.      Mouth/Throat:      Pharynx: Oropharynx is clear.   Eyes:      General:         Right eye: No discharge.         Left eye: No discharge.   Cardiovascular:      Rate and Rhythm: Normal rate and regular rhythm.      Pulses: Normal pulses.      Heart sounds: Normal heart sounds.   Pulmonary:      Effort: Pulmonary effort is normal.      Breath sounds: Normal breath sounds.   Abdominal:      General: Abdomen is flat. Bowel sounds are normal. There is no distension.      Palpations: Abdomen is soft.      Tenderness: There is no abdominal tenderness. There is no guarding.      Comments: Colostomy bag in place   Musculoskeletal:         General: Normal range of motion.      Cervical back: Normal range of motion and neck supple.   Skin:     General: Skin is warm and dry.   Neurological:      General: No focal deficit present.      Mental Status: She is alert and oriented to person, place, and time. Mental status is at baseline.   Psychiatric:      Comments: Anxious  Tangential thought process         Fluids    Intake/Output Summary (Last 24 hours) at 10/19/2023 1329  Last data filed at 10/19/2023 0800  Gross per 24 hour   Intake 426.81 ml   Output 600 ml   Net -173.19 ml         Laboratory  Recent Labs     10/17/23  0951 10/19/23  1019   WBC 8.0 7.3    RBC 4.04* 4.18*   HEMOGLOBIN 12.4 12.6   HEMATOCRIT 37.6 39.8   MCV 93.1 95.2   MCH 30.7 30.1   MCHC 33.0 31.7*   RDW 43.3 44.4   PLATELETCT 157* 165   MPV 10.7 11.3       Recent Labs     10/17/23  0800 10/18/23  0311   SODIUM 142 144   POTASSIUM 3.8 3.6   CHLORIDE 101 106   CO2 20 27   GLUCOSE 78 103*   BUN 13 11   CREATININE 1.33 1.05   CALCIUM 10.1 9.6                     Imaging  IR-US GUIDED PIV   Final Result    Ultrasound-guided PERIPHERAL IV INSERTION performed by    qualified nursing staff as above.      IR-US GUIDED PIV   Final Result    Ultrasound-guided PERIPHERAL IV INSERTION performed by    qualified nursing staff as above.      DX-ESOPHAGUS BARIUM SWALLOW SINGLE CONTRAST   Final Result      1.  Distal esophageal lumen is narrowed down to less than 5 mm, likely representing a distal esophageal stricture. It does not widen during the examination.   2.  Several areas of minimal luminal narrowing of the proximal esophagus, down to 10 mm.   3.  Moderate mid and distal esophageal dysmotility.   4.  Gastroesophageal reflux.      CT-ABDOMEN-PELVIS WITH   Final Result      Prior colectomy with LEFT lower quadrant ileostomy   Catheter in place in the urinary bladder. The tip abuts the bladder dome.         MR-BRAIN-W/O    (Results Pending)        Assessment/Plan  * Lactic acid acidosis- (present on admission)  Assessment & Plan  3.9 on presentation  In setting of dehydration, nausea/vomiting  IV fluid resuscitation  Trend lactic acid, have ordered repeat  Patient received IV fluid, lactic acidosis resolved  Monitoring.     Achalasia- (present on admission)  Assessment & Plan  Grade II  Per GI patient needs f/u as outpatient at tertiary center  Barium swallow eval showed:   1. Distal esophageal lumen is narrowed down to less than 5 mm, likely representing a distal esophageal stricture. It does not widen during the examination.  2.  Several areas of minimal luminal narrowing of the proximal esophagus, down to  10 mm.  3.  Moderate mid and distal esophageal dysmotility.  4.  Gastroesophageal reflux.    Per GI no plan for intervention at this time since patient appears to be at baseline and patient was able to tolerate barium for study w/o problems.     Patient was sitting at nurse station today trying to eat. Will monitor oral intake.     Abnormal TSH  Assessment & Plan  Normal t3 and t4.   borderline low tsh.       Delirium  Assessment & Plan  hyperverbal and tangential. Unable to answer questions appropriately. Need frequent redirection. Unclear etiology, due to Valium?  I have consulted psych, will decrease valium to 5mg iv bid prn  Check B12, B1, TSH  Appreciated psych recommendations.     Discussed with psych team.  F/u t3, t4 is normal.   depakote iv qhs per psych.   Discussed with psych they are recommending MRI brain, patient agree with MRI.     Hypophosphatemia  Assessment & Plan  Replaced as indicated with iv kphos  Replacing with iv phos.     Protein calorie malnutrition (HCC)  Assessment & Plan  Severe abdomina pain, nausea and vomiting, unable to oral diet for 1 to 2 weeks  I have ordered boost  Dietitian consult  Discussed with patient regaring plan of care, discussed probably tube feeding PEG, would like dilatation first.       Abdominal pain  Assessment & Plan  Diffuse abdominal pain, nausea and vomiting in the setting of Crohn disease, status post colectomy  CT abdomen unremarkable for acute pathology.  ESR/CRP negative.  Denies bloody stool  Unclear etiology  Refused to take p.o. medications and diet. Requested iv dilaudid   GI rec caloproectin and speech evaluation, which are ordered  Multimodal pain managements including po and iv narcotics prn. Monitoring respiratory status and sedation score    1015: Patient reports abdominal pain, nausea and vomiting  Refuse to take po meds  She is able to take some of po diet  Pending esophagram   10/16: She reports significant abdominal pain, nausea and vomiting.  Refused to take po meds  Esophagram reviewed  I have discussed with GI  Minimize narcotics use.     Continue c/o abdominal pain. Discussed with GI, they are recommending f/u as outpatient with tertiary center.   C/o abdominal pain but she is ambulating w/o any signs of pain or distress. Ostomy working.     Monitoring.     Hypomagnesemia- (present on admission)  Assessment & Plan  Replaced.       Essential hypertension- (present on admission)  Assessment & Plan  Continue metoprolol, holding diuretics in setting of dehydration  Monitoring.     Tachycardia  Assessment & Plan  Has been persistently in 110's  In setting of dehydration  Have ordered twelve-lead EKG to further assess  Monitoring.   Stable.       Hypokalemia  Assessment & Plan  Refused po supplements  I have ordered iv kcl replacement  Monitoring.   F/u labs in am,     Dehydration  Assessment & Plan  With decreased urine output as well as decreased ostomy output  In setting of nausea and vomiting  IV fluids, monitor BMP and urine output, has started to make dark urine after initial IV fluid resuscitation  Antiemetics    Gently Iv fluids.  Needs to drink fluids  As per GI patient was able to drink contrast for barium eval w/o problems.     Crohn's disease of small intestine with complication (HCC)- (present on admission)  Assessment & Plan  Has ostomy in place.  Follows with Dr. Davalos per patient  Recently completed Medrol Dosepak, will hold off on further steroids as unclear that there is current Crohn's flare given no ostomy output or findings of inflammation on CT abdomen  Check inflammatory markers ESR, CRP negative  No bloody bowel movement or obstruction, chance of acute Crohn small bowel flare is not high.   Ordered calprotectin fecal, patient refusing stool analysis.   Consulted GI, discussed with GI  calprotectin pending.   Discussed with GI today, no intervention planned.     calprotectin sent and pending.            VTE prophylaxis: heparin    I  have performed a physical exam and reviewed and updated ROS and Plan today (10/19/2023). In review of yesterday's note (10/18/2023), there are no changes except as documented above.    My total time spent caring for the patient on the day of the encounter was 36 minutes.   This does not include time spent on separately billable procedures/tests.

## 2023-10-19 NOTE — TELEPHONE ENCOUNTER
AB    Caller: Jana Overton    Topic/issue: Patient is currently admitted to the hospital for tachycardia. She is unable to swallow and take her medication metoprolol tartrate, so she is looking for a possible alternative with IV. She is also stating she is  in AFIB. She is asking for a callback at the number below.     Please advise.      Callback Number: [phone]    Thank you,    Catherine ROCHA

## 2023-10-19 NOTE — PROGRESS NOTES
"Pt refusing all oral medications, initially refusing skin and other nursing assessments, pulled away from this RN and said \"leave me alone; go away.\"  Later after receiving requested pain meds pt agreeable to skin assessment, spoke at length w/ the RN and sitter about many topics w/ pressured speech, but in positive mood. A&Ox4 w/ some confusion. Pt cont. To refuse bed alarm. Pt resting in bed w/ call light and belongings in reach, 1:1 sitter at bedside, hourly rounding in place. No additional needs at this time.   "

## 2023-10-19 NOTE — PROGRESS NOTES
Call placed to [facility] for information related to milton placed as outpatient.  Awaiting return phone call.   Spoke with Dianne OLIVIA with [facility] [facility] milton was placed in office on 10/12 for urinary retention.  Plan was to have voiding trial in office two weeks after placement.

## 2023-10-20 LAB
ALBUMIN SERPL BCP-MCNC: 3.9 G/DL (ref 3.2–4.9)
ALBUMIN/GLOB SERPL: 1.3 G/DL
ALP SERPL-CCNC: 63 U/L (ref 30–99)
ALT SERPL-CCNC: 19 U/L (ref 2–50)
ANION GAP SERPL CALC-SCNC: 13 MMOL/L (ref 7–16)
AST SERPL-CCNC: 30 U/L (ref 12–45)
BILIRUB SERPL-MCNC: 0.4 MG/DL (ref 0.1–1.5)
BUN SERPL-MCNC: 11 MG/DL (ref 8–22)
CALCIUM ALBUM COR SERPL-MCNC: 10.4 MG/DL (ref 8.5–10.5)
CALCIUM SERPL-MCNC: 10.3 MG/DL (ref 8.5–10.5)
CHLORIDE SERPL-SCNC: 102 MMOL/L (ref 96–112)
CO2 SERPL-SCNC: 25 MMOL/L (ref 20–33)
CREAT SERPL-MCNC: 1.06 MG/DL (ref 0.5–1.4)
ERYTHROCYTE [DISTWIDTH] IN BLOOD BY AUTOMATED COUNT: 44.5 FL (ref 35.9–50)
GFR SERPLBLD CREATININE-BSD FMLA CKD-EPI: 56 ML/MIN/1.73 M 2
GLOBULIN SER CALC-MCNC: 3.1 G/DL (ref 1.9–3.5)
GLUCOSE SERPL-MCNC: 104 MG/DL (ref 65–99)
HCT VFR BLD AUTO: 39.2 % (ref 37–47)
HGB BLD-MCNC: 12.8 G/DL (ref 12–16)
MCH RBC QN AUTO: 30.5 PG (ref 27–33)
MCHC RBC AUTO-ENTMCNC: 32.7 G/DL (ref 32.2–35.5)
MCV RBC AUTO: 93.6 FL (ref 81.4–97.8)
PLATELET # BLD AUTO: 169 K/UL (ref 164–446)
PMV BLD AUTO: 10.8 FL (ref 9–12.9)
POTASSIUM SERPL-SCNC: 3.6 MMOL/L (ref 3.6–5.5)
PROT SERPL-MCNC: 7 G/DL (ref 6–8.2)
RBC # BLD AUTO: 4.19 M/UL (ref 4.2–5.4)
SODIUM SERPL-SCNC: 140 MMOL/L (ref 135–145)
VALPROATE SERPL-MCNC: 43.4 UG/ML (ref 50–100)
VIT B1 BLD-MCNC: 86 NMOL/L (ref 70–180)
WBC # BLD AUTO: 5.8 K/UL (ref 4.8–10.8)

## 2023-10-20 PROCEDURE — 700111 HCHG RX REV CODE 636 W/ 250 OVERRIDE (IP): Performed by: STUDENT IN AN ORGANIZED HEALTH CARE EDUCATION/TRAINING PROGRAM

## 2023-10-20 PROCEDURE — 99231 SBSQ HOSP IP/OBS SF/LOW 25: CPT | Mod: GC | Performed by: STUDENT IN AN ORGANIZED HEALTH CARE EDUCATION/TRAINING PROGRAM

## 2023-10-20 PROCEDURE — 700111 HCHG RX REV CODE 636 W/ 250 OVERRIDE (IP): Mod: JZ | Performed by: STUDENT IN AN ORGANIZED HEALTH CARE EDUCATION/TRAINING PROGRAM

## 2023-10-20 PROCEDURE — 80164 ASSAY DIPROPYLACETIC ACD TOT: CPT

## 2023-10-20 PROCEDURE — G0378 HOSPITAL OBSERVATION PER HR: HCPCS

## 2023-10-20 PROCEDURE — 700105 HCHG RX REV CODE 258: Performed by: STUDENT IN AN ORGANIZED HEALTH CARE EDUCATION/TRAINING PROGRAM

## 2023-10-20 PROCEDURE — 36415 COLL VENOUS BLD VENIPUNCTURE: CPT

## 2023-10-20 PROCEDURE — 80053 COMPREHEN METABOLIC PANEL: CPT

## 2023-10-20 PROCEDURE — 700111 HCHG RX REV CODE 636 W/ 250 OVERRIDE (IP): Mod: JZ | Performed by: HOSPITALIST

## 2023-10-20 PROCEDURE — 700102 HCHG RX REV CODE 250 W/ 637 OVERRIDE(OP): Performed by: STUDENT IN AN ORGANIZED HEALTH CARE EDUCATION/TRAINING PROGRAM

## 2023-10-20 PROCEDURE — 99232 SBSQ HOSP IP/OBS MODERATE 35: CPT | Performed by: HOSPITALIST

## 2023-10-20 PROCEDURE — 700111 HCHG RX REV CODE 636 W/ 250 OVERRIDE (IP)

## 2023-10-20 PROCEDURE — 85027 COMPLETE CBC AUTOMATED: CPT

## 2023-10-20 PROCEDURE — A9270 NON-COVERED ITEM OR SERVICE: HCPCS | Performed by: STUDENT IN AN ORGANIZED HEALTH CARE EDUCATION/TRAINING PROGRAM

## 2023-10-20 RX ORDER — NARATRIPTAN 2.5 MG/1
2.5 TABLET ORAL PRN
Status: DISCONTINUED | OUTPATIENT
Start: 2023-10-20 | End: 2023-10-20

## 2023-10-20 RX ORDER — SUMATRIPTAN 50 MG/1
100 TABLET, FILM COATED ORAL
Status: DISCONTINUED | OUTPATIENT
Start: 2023-10-20 | End: 2023-10-28 | Stop reason: HOSPADM

## 2023-10-20 RX ORDER — KETOROLAC TROMETHAMINE 30 MG/ML
15 INJECTION, SOLUTION INTRAMUSCULAR; INTRAVENOUS ONCE
Status: COMPLETED | OUTPATIENT
Start: 2023-10-20 | End: 2023-10-20

## 2023-10-20 RX ADMIN — KETOROLAC TROMETHAMINE 15 MG: 30 INJECTION, SOLUTION INTRAMUSCULAR; INTRAVENOUS at 05:10

## 2023-10-20 RX ADMIN — HEPARIN SODIUM 5000 UNITS: 5000 INJECTION, SOLUTION INTRAVENOUS; SUBCUTANEOUS at 05:14

## 2023-10-20 RX ADMIN — VALPROATE SODIUM 500 MG: 100 INJECTION, SOLUTION INTRAVENOUS at 17:38

## 2023-10-20 RX ADMIN — ONDANSETRON 4 MG: 2 INJECTION INTRAMUSCULAR; INTRAVENOUS at 05:17

## 2023-10-20 RX ADMIN — DIAZEPAM 5 MG: 5 INJECTION, SOLUTION INTRAMUSCULAR; INTRAVENOUS at 01:03

## 2023-10-20 RX ADMIN — HEPARIN SODIUM 5000 UNITS: 5000 INJECTION, SOLUTION INTRAVENOUS; SUBCUTANEOUS at 13:51

## 2023-10-20 RX ADMIN — NYSTATIN 500000 UNITS: 100000 SUSPENSION ORAL at 13:52

## 2023-10-20 RX ADMIN — ONDANSETRON 4 MG: 2 INJECTION INTRAMUSCULAR; INTRAVENOUS at 12:53

## 2023-10-20 RX ADMIN — HYDROMORPHONE HYDROCHLORIDE 0.5 MG: 1 INJECTION, SOLUTION INTRAMUSCULAR; INTRAVENOUS; SUBCUTANEOUS at 12:53

## 2023-10-20 RX ADMIN — DIAZEPAM 5 MG: 5 INJECTION, SOLUTION INTRAMUSCULAR; INTRAVENOUS at 16:36

## 2023-10-20 RX ADMIN — NYSTATIN 500000 UNITS: 100000 SUSPENSION ORAL at 09:42

## 2023-10-20 ASSESSMENT — PAIN DESCRIPTION - PAIN TYPE
TYPE: ACUTE PAIN;CHRONIC PAIN
TYPE: CHRONIC PAIN;ACUTE PAIN

## 2023-10-20 ASSESSMENT — ENCOUNTER SYMPTOMS
WHEEZING: 0
NAUSEA: 0
DEPRESSION: 0
NEUROLOGICAL NEGATIVE: 1
VOMITING: 0
CHILLS: 1
BRUISES/BLEEDS EASILY: 0
BACK PAIN: 0
MYALGIAS: 0
CARDIOVASCULAR NEGATIVE: 1
DOUBLE VISION: 0
PALPITATIONS: 0
HEMOPTYSIS: 0
FEVER: 0
HEADACHES: 0
DIZZINESS: 0
CHILLS: 0
PND: 0
HEARTBURN: 0
RESPIRATORY NEGATIVE: 1
NAUSEA: 1
COUGH: 0
CLAUDICATION: 0
MUSCULOSKELETAL NEGATIVE: 1
ABDOMINAL PAIN: 1
BLURRED VISION: 0

## 2023-10-20 NOTE — CARE PLAN
The patient is Stable - Low risk of patient condition declining or worsening    Shift Goals  Clinical Goals: Pain mgnmt, increase oral intake, reduce anxiety, rest  Patient Goals: Pain mgnmt, rest  Family Goals: DALTON    Progress made toward(s) clinical / shift goals: Pt medicated per MAR, slept for some of night and otherwise calmly resting at nurses station.     Problem: Pain - Standard  Goal: Alleviation of pain or a reduction in pain to the patient’s comfort goal  Outcome: Progressing     Problem: Knowledge Deficit - Standard  Goal: Patient and family/care givers will demonstrate understanding of plan of care, disease process/condition, diagnostic tests and medications  Outcome: Progressing     Problem: Discharge Barriers/Planning  Goal: Patient's continuum of care needs are met  Outcome: Progressing     Problem: Urinary Elimination  Goal: Establish and maintain regular urinary output  Outcome: Progressing     Problem: Skin Integrity  Goal: Skin integrity is maintained or improved  Outcome: Progressing     Problem: Fall Risk  Goal: Patient will remain free from falls  Outcome: Progressing

## 2023-10-20 NOTE — CARE PLAN
"The patient is Stable - Low risk of patient condition declining or worsening    Shift Goals  Clinical Goals: Patient will increase oral intake, pain will be rated 5 or less post medication  Patient Goals: pain control, comfort  Family Goals: DALTON    Progress made toward(s) clinical / shift goals:  patient spent most of the day at the nurses station, nibbling on food, small bite very \"10 mins\" per pt    Patient is not progressing towards the following goals: pt continued to state her pain was uncontroled post medication administration      "

## 2023-10-20 NOTE — PROGRESS NOTES
"Hospital Medicine Daily Progress Note    Date of Service  10/20/2023    Chief Complaint  Jana Overton is a 68 y.o. female admitted 10/12/2023 with abdominal pain, nausea    Hospital Course  68 y.o. female who presented 10/12/2023  with urinary retention, nausea.  Patient has a history of Crohn's disease status post colectomy with ileostomy on left side, hypertension, chronic pain.  She has had 2 recent emergency room visits due to worsening of chronic abdominal pain and difficulty tolerating p.o. intake with failure of p.o. Zofran at home.  On her last ED visit she had a borderline FLORES with creatinine increased to 1.5 from baseline of 1.2.  She was discharged home after some IV fluids.  She presents again due to concern for decreased urinary output.  She had a Sage placed at urology office for unclear reason however did not drain much urine.  Kidney function stable from last week however did have elevated lactic acid to 3.9.  After some IV fluids she is making some dark urine.  Due to concern for elevated lactic acid and inability to tolerate p.o. intake admission was requested.  CT abdomen unremarkable  Lactic acid 3.9 on admission, which has been resolved  ESR, CRP negative    Interval Problem Update  Seen patient at bedside  Patient is very hyperverbal and tangential.   Unable to answer questions appropriately  She refused to take po meds. State po meds will make her nausea, vomiting  However she is able to take po diet, however per patient she can only take one bite every 10 mins  She reports significant abdominal pain and leg cram. She has been talking in the room continuously. No sign of severe pain. Asking dialudid and valium iv. She states \"I used to inject iv dilaudid 4mg every 2h to my chest.\" \"I took valium 5mg for muscle spasm. If not working in 20 mins, I took another 5mg.\"    Paranoid and delirium  I have spoke to psychiatry, concerning delirium due to   I have spoke to psychiatry  Concerning " delirium due to Valium, I have reduced valium to 5mg bid prn  Ordered VitB1, B12, TSH  Discussed with GI      10/17 in bed, patient is new to me today, discussed with GI and psych team, continue supportive treatment will start depakote per psych recommendation, iv fluids, close monitoring. F/u t4 level.   10/18 patient is up and walking, legal hold stared per psych, I had long conversation with patient regarding plan of care, I discussed with GI and per GI team no plan for intervention during this admission, patient continues to refuse to eat or take po medications, I asked patient how she does at home and she said she is able to take meds at home w/o problems, I told her we need to try here in the hospital to see if she is able to tolerate po meds and foot and she refused she said she knows when she get back home she will be able to take po but not here in the hospital, she is asking for more iv diazepam and dilaudid.   10/19 patient is at nurse station, no fever or chills, she is been ambulating, still hyperverbal and tangential, discussed with psych team, they are recommended MRI brain, she agree with test.   10/20 in chair, she is about to have breakfast, she is in good spirit today, she is willing to eat, discussed with dietitian. Discussed with urology team, milton cath placed since patient had not urinated in 3 days, urine cx done and came back neg, per urology ok to remove milton and do voiding trial.     I have discussed this patient's plan of care and discharge plan at IDT rounds today with Case Management, Nursing, Nursing leadership, and other members of the IDT team.    Consultants/Specialty  GI  Psych         Code Status  Full Code    Disposition  The patient is not medically cleared for discharge to home or a post-acute facility.      I have placed the appropriate orders for post-discharge needs.    Review of Systems  Review of Systems   Constitutional:  Negative for chills and fever.   Eyes:  Negative  for blurred vision and double vision.   Respiratory:  Negative for cough, hemoptysis and wheezing.    Cardiovascular:  Negative for chest pain, palpitations, claudication, leg swelling and PND.   Gastrointestinal:  Positive for abdominal pain. Negative for heartburn, nausea and vomiting.   Genitourinary:  Negative for hematuria and urgency.   Musculoskeletal:  Negative for back pain and myalgias.   Skin:  Negative for rash.   Neurological:  Negative for dizziness and headaches.   Endo/Heme/Allergies:  Does not bruise/bleed easily.   Psychiatric/Behavioral:  Negative for depression.         Physical Exam  Temp:  [35.8 °C (96.5 °F)-36.7 °C (98 °F)] 35.8 °C (96.5 °F)  Pulse:  [] 106  Resp:  [16-19] 18  BP: (118-154)/(47-79) 154/79  SpO2:  [95 %-100 %] 95 %    Physical Exam  Vitals and nursing note reviewed.   Constitutional:       Appearance: Normal appearance. She is ill-appearing.   HENT:      Head: Normocephalic and atraumatic.      Nose: Nose normal.      Mouth/Throat:      Pharynx: Oropharynx is clear.   Eyes:      General:         Right eye: No discharge.         Left eye: No discharge.   Cardiovascular:      Rate and Rhythm: Normal rate and regular rhythm.      Pulses: Normal pulses.      Heart sounds: Normal heart sounds.   Pulmonary:      Effort: Pulmonary effort is normal.      Breath sounds: Normal breath sounds.   Abdominal:      General: Abdomen is flat. Bowel sounds are normal. There is no distension.      Palpations: Abdomen is soft.      Tenderness: There is no abdominal tenderness. There is no guarding.      Comments: Colostomy bag in place   Musculoskeletal:         General: Normal range of motion.      Cervical back: Normal range of motion and neck supple.   Skin:     General: Skin is warm and dry.   Neurological:      General: No focal deficit present.      Mental Status: She is alert and oriented to person, place, and time. Mental status is at baseline.   Psychiatric:      Comments:  Anxious  Tangential thought process         Fluids    Intake/Output Summary (Last 24 hours) at 10/20/2023 1421  Last data filed at 10/19/2023 1812  Gross per 24 hour   Intake 222 ml   Output 900 ml   Net -678 ml         Laboratory  Recent Labs     10/19/23  1019 10/20/23  0801   WBC 7.3 5.8   RBC 4.18* 4.19*   HEMOGLOBIN 12.6 12.8   HEMATOCRIT 39.8 39.2   MCV 95.2 93.6   MCH 30.1 30.5   MCHC 31.7* 32.7   RDW 44.4 44.5   PLATELETCT 165 169   MPV 11.3 10.8       Recent Labs     10/18/23  0311 10/19/23  1402 10/20/23  0801   SODIUM 144 143 140   POTASSIUM 3.6 3.4* 3.6   CHLORIDE 106 104 102   CO2 27 26 25   GLUCOSE 103* 102* 104*   BUN 11 12 11   CREATININE 1.05 1.01 1.06   CALCIUM 9.6 9.7 10.3                     Imaging  IR-US GUIDED PIV   Final Result    Ultrasound-guided PERIPHERAL IV INSERTION performed by    qualified nursing staff as above.      IR-US GUIDED PIV   Final Result    Ultrasound-guided PERIPHERAL IV INSERTION performed by    qualified nursing staff as above.      DX-ESOPHAGUS BARIUM SWALLOW SINGLE CONTRAST   Final Result      1.  Distal esophageal lumen is narrowed down to less than 5 mm, likely representing a distal esophageal stricture. It does not widen during the examination.   2.  Several areas of minimal luminal narrowing of the proximal esophagus, down to 10 mm.   3.  Moderate mid and distal esophageal dysmotility.   4.  Gastroesophageal reflux.      CT-ABDOMEN-PELVIS WITH   Final Result      Prior colectomy with LEFT lower quadrant ileostomy   Catheter in place in the urinary bladder. The tip abuts the bladder dome.         MR-BRAIN-W/O    (Results Pending)        Assessment/Plan  * Lactic acid acidosis- (present on admission)  Assessment & Plan  3.9 on presentation  In setting of dehydration, nausea/vomiting  IV fluid resuscitation  Trend lactic acid, have ordered repeat  Patient received IV fluid, lactic acidosis resolved  Monitoring.     Achalasia- (present on admission)  Assessment &  Plan  Grade II  Per GI patient needs f/u as outpatient at tertiary center  Barium swallow eval showed:   1. Distal esophageal lumen is narrowed down to less than 5 mm, likely representing a distal esophageal stricture. It does not widen during the examination.  2.  Several areas of minimal luminal narrowing of the proximal esophagus, down to 10 mm.  3.  Moderate mid and distal esophageal dysmotility.  4.  Gastroesophageal reflux.    Per GI no plan for intervention at this time since patient appears to be at baseline and patient was able to tolerate barium for study w/o problems.     Patient was sitting at nurse station today trying to eat. Will monitor oral intake.   Patient is eating better. Patient is still eating very slow and improved oral intake.     Abnormal TSH  Assessment & Plan  Normal t3 and t4.   borderline low tsh.       Delirium  Assessment & Plan  hyperverbal and tangential. Unable to answer questions appropriately. Need frequent redirection. Unclear etiology, due to Valium?  I have consulted psych, will decrease valium to 5mg iv bid prn  Check B12, B1, TSH  Appreciated psych recommendations.     Discussed with psych team.  F/u t3, t4 is normal.   depakote iv qhs per psych.   Discussed with psych they are recommending MRI brain, patient agree with MRI.     Hypophosphatemia  Assessment & Plan  Replaced as indicated with iv kphos  Replacing with iv phos.     Protein calorie malnutrition (HCC)  Assessment & Plan  Severe abdomina pain, nausea and vomiting, unable to oral diet for 1 to 2 weeks  I have ordered boost  Dietitian consult  Discussed with patient regaring plan of care, discussed probably tube feeding PEG, would like dilatation first.       Abdominal pain  Assessment & Plan  Diffuse abdominal pain, nausea and vomiting in the setting of Crohn disease, status post colectomy  CT abdomen unremarkable for acute pathology.  ESR/CRP negative.  Denies bloody stool  Unclear etiology  Refused to take p.o.  medications and diet. Requested iv dilaudid   GI rec caloproectin and speech evaluation, which are ordered  Multimodal pain managements including po and iv narcotics prn. Monitoring respiratory status and sedation score    1015: Patient reports abdominal pain, nausea and vomiting  Refuse to take po meds  She is able to take some of po diet  Pending esophagram   10/16: She reports significant abdominal pain, nausea and vomiting. Refused to take po meds  Esophagram reviewed  I have discussed with GI  Minimize narcotics use.     Continue c/o abdominal pain. Discussed with GI, they are recommending f/u as outpatient with tertiary center.   C/o abdominal pain but she is ambulating w/o any signs of pain or distress. Ostomy working.     Monitoring.   Improved.     Hypomagnesemia- (present on admission)  Assessment & Plan  Replaced.       Essential hypertension- (present on admission)  Assessment & Plan  Continue metoprolol, holding diuretics in setting of dehydration  Monitoring.     Tachycardia  Assessment & Plan  Has been persistently in 110's  In setting of dehydration  Have ordered twelve-lead EKG to further assess  Monitoring.   Stable.       Hypokalemia  Assessment & Plan  Refused po supplements  I have ordered iv kcl replacement  Monitoring.   F/u labs in am,     Dehydration  Assessment & Plan  With decreased urine output as well as decreased ostomy output  In setting of nausea and vomiting  IV fluids, monitor BMP and urine output, has started to make dark urine after initial IV fluid resuscitation  Antiemetics    Gently Iv fluids.  Needs to drink fluids  As per GI patient was able to drink contrast for barium eval w/o problems.   Increasing oral intake.     Crohn's disease of small intestine with complication (HCC)- (present on admission)  Assessment & Plan  Has ostomy in place.  Follows with Dr. Davalos per patient  Recently completed Medrol Dosepak, will hold off on further steroids as unclear that there is current  Crohn's flare given no ostomy output or findings of inflammation on CT abdomen  Check inflammatory markers ESR, CRP negative  No bloody bowel movement or obstruction, chance of acute Crohn small bowel flare is not high.   Ordered calprotectin fecal, patient refusing stool analysis.   Consulted GI, discussed with GI  calprotectin pending.   Discussed with GI today, no intervention planned.     calprotectin sent and pending.            VTE prophylaxis: heparin    I have performed a physical exam and reviewed and updated ROS and Plan today (10/20/2023). In review of yesterday's note (10/19/2023), there are no changes except as documented above.    My total time spent caring for the patient on the day of the encounter was 36 minutes.   This does not include time spent on separately billable procedures/tests.

## 2023-10-20 NOTE — TELEPHONE ENCOUNTER
S/W pt, she is currently in hospital. She says she gained 15lbs in one day and had trouble breathing, tachycardia noted on EKG. Pt asking JM to advise on her medications. We discussed that team at the hospital are in charge of her care at this time, they will consult with rounding cardiologist if warranted. Her biggest concern relayed on this call is that she can not take pill form metoprolol and the nurses there have told her that there is no IV form of this medication.     Pt has appt in December, I've asked that she call when she is discharged to see if we can move her appt up. Pt agrees to do this, she wants FELICITA to review her hospital notes and be aware of her situation.

## 2023-10-20 NOTE — DIETARY
Nutrition Update:    Day 0 of admit.  Jana Overton is a 68 y.o. female with admitting DX of Lactic acid acidosis [E87.20].  Patient being followed to optimize nutrition.    RD met with pt at nursing station table. Pt's mentation seems to have significantly improved compared to initial RD encounter. RD able to discuss all food preferences with pt. After conversation, RD assembled specialized meal tray in the kitchen and delivered to pt. RD also obtained a box of Magic Cup's and placed them in the floor nourishment freezer with the pt's name on them. Pt was very grateful for this and RN notified with updates. RD collaborated with MD who states no plan for dilation or PEG tube at this time. RD expects pt will continue to improve nutritionally.    Current meal plan discussed today between RD and pt:  -B: applesauce, eggs with cheese, [employer]ani yogurt smoothie  -L: Tortellini, marinara sauce, V8 beverage  -D: Daily special  -Snacks: Berry Magic Cup available as requested    Current Diet: Cardiac, Boost Plus TID   PO intake: 25-50% x3 meals, <25% x2 meals    Problem: Nutritional:  Goal: Achieve adequate nutritional intake  Description: Patient will consume ~50% of meals  Outcome: Progressing     RD following

## 2023-10-20 NOTE — PROGRESS NOTES
Patient sat at nurses station with sitter throughout the day.  She tolerated soft foods without any difficulty swallowing.  Tolerated soup, tortellini, mashed potatoes and gravy. Patient refused oral pills stating she could choke.  Patient had grandiose thoughts throughout the day but speech is slower than yesterday and patient has not been verbally aggressive at all throughout the day.

## 2023-10-20 NOTE — PROGRESS NOTES
Pt cont to refuse all oral medications, medicated for pain and nausea throughout night per MAR. A&Ox4 w/ some delusional/grandiose thoughts but agreeable and pleasant to staff throughout night. Ambulating often and sitting at nurses station. 1:1 sitter at pt side, hourly rounding in place. No additional needs at this time.

## 2023-10-20 NOTE — CONSULTS
"PSYCHIATRIC FOLLOW-UP:(established)  *Reason for admission:      Nausea, Vomiting, abdominal pain, poor PO intake  *Legal Hold Status:         On Hold     Chart reviewed.         *HPI:          Patient is seen at bedside this morning.  She stated that she slept well.  Nursing was unable to confirm this but will start tracking her sleep from now on.  She has been able to eat more food and is pleased with nutrition services that have been helping her access foods that she can tolerate.  She continues to be hyperverbal and grandiose, frequently making statements about her numerous qualifications and expertise in various areas including engineering, nutrition, health care, etc.  She is upset about having to be on legal hold and made statements like \" you are keeping me here on purpose while my  is away hunting,\" concerning for some paranoia.  She continues to perseverate on only following advice of her outside doctors who are \" the best experts in their field\".  MOCA cognitive assessment was attempted but unable to be completed due to patient being too distracted.  She continues to endorse nausea and headache and there is a rash on her hands.  She denies being itchy and attributes it to including loss of her IV site.  There is no rash on her chest or back.  She denies any fever or malaise.    Medical ROS (as pertinent):     Review of Systems   Constitutional:  Positive for chills. Negative for fever.   HENT: Negative.     Respiratory: Negative.     Cardiovascular: Negative.    Gastrointestinal:  Positive for nausea.   Musculoskeletal: Negative.    Skin:  Positive for rash.   Neurological: Negative.            *Psychiatric Examination:  Vitals: BP (!) 151/85 Comment: RN aware  Pulse (!) 103 Comment: Rn aware  Temp 36 °C (96.8 °F) (Temporal)   Resp 19   Ht 1.829 m (6')   Wt 68.9 kg (151 lb 14.4 oz)   SpO2 100%   BMI 20.60 kg/m²    General Appearance: 68-year-old female, presented age, wearing hospital garb, " "demanding, mostly pleasant and cooperative  Abnormal Movements: No tremor, tics, dyskinesias.  No psychomotor agitation  Gait and Posture: Slow, steady, careful.  Speech: Pressured although slightly slower rate since started depakote hyperverbal, normal volume, normal tone  Thought processes: Mostly circumstantial, at times tangential, disorganized, not logical, grandiose  Associations: Associations present  Abnormal or Psychotic Thoughts: No evidence of AVH, has paranoid and grandiose delusions  Judgement and Insight: Poor/poor  Orientation: Oriented to place, person, time, situation  Recent and Remote Memory: Grossly intact, difficult to fully assess due to distractibility and frequent switching topics and grandiosity  Attention Span and Concentration: Distractible  Language: Fluent in English  Fund of Knowledge: Grossly appropriate for age  Mood and Affect: \" Rate\", elevated, still irritable but improving, some lability still present  SI/HI: Denies      EKG: QTc 482 on 10/13/2023  Brain Imaging: None  EEG: None       *Labs personally reviewed:   Recent Results (from the past 24 hour(s))   CBC WITHOUT DIFFERENTIAL    Collection Time: 10/19/23 10:19 AM   Result Value Ref Range    WBC 7.3 4.8 - 10.8 K/uL    RBC 4.18 (L) 4.20 - 5.40 M/uL    Hemoglobin 12.6 12.0 - 16.0 g/dL    Hematocrit 39.8 37.0 - 47.0 %    MCV 95.2 81.4 - 97.8 fL    MCH 30.1 27.0 - 33.0 pg    MCHC 31.7 (L) 32.2 - 35.5 g/dL    RDW 44.4 35.9 - 50.0 fL    Platelet Count 165 164 - 446 K/uL    MPV 11.3 9.0 - 12.9 fL   Basic Metabolic Panel    Collection Time: 10/19/23  2:02 PM   Result Value Ref Range    Sodium 143 135 - 145 mmol/L    Potassium 3.4 (L) 3.6 - 5.5 mmol/L    Chloride 104 96 - 112 mmol/L    Co2 26 20 - 33 mmol/L    Glucose 102 (H) 65 - 99 mg/dL    Bun 12 8 - 22 mg/dL    Creatinine 1.01 0.50 - 1.40 mg/dL    Calcium 9.7 8.5 - 10.5 mg/dL    Anion Gap 13.0 7.0 - 16.0   MAGNESIUM    Collection Time: 10/19/23  2:02 PM   Result Value Ref Range    " Magnesium 1.8 1.5 - 2.5 mg/dL   PHOSPHORUS    Collection Time: 10/19/23  2:02 PM   Result Value Ref Range    Phosphorus 2.3 (L) 2.5 - 4.5 mg/dL   ESTIMATED GFR    Collection Time: 10/19/23  2:02 PM   Result Value Ref Range    GFR (CKD-EPI) 60 >60 mL/min/1.73 m 2     Recent Labs     10/14/23  0712 10/17/23  0800 10/20/23  0801   ASTSGOT 29 32 30   ALTSGPT 15 21 19   TBILIRUBIN 0.5 0.5 0.4   GLOBULIN 2.7 2.9 3.1     Depakote level 10/21/23: 43.4    Current medications:  Current Facility-Administered Medications   Medication Dose Route Frequency Provider Last Rate Last Admin    SUMAtriptan (Imitrex) tablet 100 mg  100 mg Oral Q2HRS PRN AAKASH FitchPParthRParthNParth        diazePAM (Valium) injection 5 mg  5 mg Intravenous Q8HRS PRN Rod Valencia M.D.   5 mg at 10/20/23 0103    oxyCODONE immediate-release (Roxicodone) tablet 5 mg  5 mg Oral Q3HRS PRCONNER Valencia M.D.        Or    oxyCODONE immediate release (Roxicodone) tablet 10 mg  10 mg Oral Q3HRS PRCONNER Valencia M.D.        Or    HYDROmorphone (Dilaudid) injection 0.5 mg  0.5 mg Intravenous Q8HRS PRN Rod Valencia M.D.   0.5 mg at 10/19/23 2206    valproate (Depacon) 500 mg in dextrose 5% 50 mL IVPB  500 mg Intravenous Q EVENING Kim Akers D.O.   Stopped at 10/19/23 1829    heparin injection 5,000 Units  5,000 Units Subcutaneous Q8HRS Leonel Rodriguez M.D.   5,000 Units at 10/20/23 0514    Pharmacy Consult Request ...Pain Management Review 1 Each  1 Each Other PHARMACY TO DOSE Leonel Rodriguez M.D.        acetaminophen (Tylenol) tablet 650 mg  650 mg Oral Q6HRS PRN Leonel Rodriguez M.D.        ondansetron (Zofran) syringe/vial injection 4 mg  4 mg Intravenous Q4HRS PRN Leonel Rodriguez M.D.   4 mg at 10/20/23 0517    hydroxychloroquine (Plaquenil) tablet 300 mg  300 mg Oral DAILY Leonel Rodriguez M.D.        levalbuterol (Xopenex) 0.63 MG/3ML nebulizer solution 0.63 mg  3 mL Inhalation Q4HRS PRN Leonel LIM  ROXANNE Rodriguez        metoprolol tartrate (Lopressor) tablet 50 mg  50 mg Oral BID Leonel Rodriguez M.D.        nystatin (Mycostatin) 212689 UNIT/ML suspension 500,000 Units  5 mL Swish & Spit 4X/DAY Milka Westfall M.D.   500,000 Units at 10/19/23 2210     Assessment:  This is a 68-year-old female with past history symptoms consistent with kely, unspecified anxiety and OCPD (all by history from chart review) hospitalized for lactic acidosis.  She currently meets criteria for acute kely as patient is grandiose, talkative, has pressured speech, not sleeping, irritable, easily distractible, mood lability present.  She was started on Depakote to address nausea, headache and vomiting; and mood; initially some improvement but still significantly disorganized, pressured speech, grandiose, labile at times.  Depakote level was checked and was currently subtherapeutic, warranting increasing dose although will be done slowly as she is high risk for becoming toxic if titration as directed.  MRI brain was ordered but not yet performed. At this time she continues to meet criteria for legal hold as she is still unable to care for her own needs in setting of acute mental health disorder that continues at this time.     Delirium (resolved)  Manic disorder (unspecified, rule out medical etiology)     Medical :  See medical note        Plan:  1- Legal hold: On hold  2- Psychotropic medications              *Change depakote to 250mg qAM and 500 mg QHS IV.  3- Labs ordered/reviewed: Reviewed, plan to repeat Depakote level next week,  Thursday 10/26/23  4- brain MRI pending to rule out alternative causes of kley.   5. Please transfer to a psychiatric facility when patient medically cleared (able void without cath, able to take at least PO medications)  6-discussed the case with:  Dr. Law, Dr. Duane Griffinter: 1:1   Personal belongings: yes  Cell phone: yes  Visitors: family only    This note was created using voice recognition  software (Dragon). The accuracy of the dictation is limited by the abilities of the software. I have reviewed the note prior to signing. However, error related to voice recognition software and /or scribes may still exist and should be interpreted within the appropriate context.

## 2023-10-21 ENCOUNTER — APPOINTMENT (OUTPATIENT)
Dept: RADIOLOGY | Facility: MEDICAL CENTER | Age: 68
DRG: 699 | End: 2023-10-21
Attending: HOSPITALIST
Payer: MEDICARE

## 2023-10-21 LAB
CALPROTECTIN STL-MCNT: <5 UG/G
MAGNESIUM SERPL-MCNC: 1.5 MG/DL (ref 1.5–2.5)
PHOSPHATE SERPL-MCNC: 2 MG/DL (ref 2.5–4.5)

## 2023-10-21 PROCEDURE — 99232 SBSQ HOSP IP/OBS MODERATE 35: CPT | Performed by: HOSPITALIST

## 2023-10-21 PROCEDURE — 700105 HCHG RX REV CODE 258: Performed by: STUDENT IN AN ORGANIZED HEALTH CARE EDUCATION/TRAINING PROGRAM

## 2023-10-21 PROCEDURE — 700105 HCHG RX REV CODE 258: Performed by: HOSPITALIST

## 2023-10-21 PROCEDURE — 700111 HCHG RX REV CODE 636 W/ 250 OVERRIDE (IP): Performed by: STUDENT IN AN ORGANIZED HEALTH CARE EDUCATION/TRAINING PROGRAM

## 2023-10-21 PROCEDURE — 36415 COLL VENOUS BLD VENIPUNCTURE: CPT

## 2023-10-21 PROCEDURE — 84100 ASSAY OF PHOSPHORUS: CPT

## 2023-10-21 PROCEDURE — 700102 HCHG RX REV CODE 250 W/ 637 OVERRIDE(OP): Performed by: HOSPITALIST

## 2023-10-21 PROCEDURE — G0378 HOSPITAL OBSERVATION PER HR: HCPCS

## 2023-10-21 PROCEDURE — 700111 HCHG RX REV CODE 636 W/ 250 OVERRIDE (IP): Mod: JZ | Performed by: HOSPITALIST

## 2023-10-21 PROCEDURE — A9270 NON-COVERED ITEM OR SERVICE: HCPCS | Performed by: HOSPITALIST

## 2023-10-21 PROCEDURE — 51798 US URINE CAPACITY MEASURE: CPT

## 2023-10-21 PROCEDURE — 83735 ASSAY OF MAGNESIUM: CPT

## 2023-10-21 RX ORDER — MAGNESIUM SULFATE HEPTAHYDRATE 40 MG/ML
2 INJECTION, SOLUTION INTRAVENOUS ONCE
Status: COMPLETED | OUTPATIENT
Start: 2023-10-21 | End: 2023-10-21

## 2023-10-21 RX ADMIN — HEPARIN SODIUM 5000 UNITS: 5000 INJECTION, SOLUTION INTRAVENOUS; SUBCUTANEOUS at 00:14

## 2023-10-21 RX ADMIN — HYDROMORPHONE HYDROCHLORIDE 0.5 MG: 1 INJECTION, SOLUTION INTRAMUSCULAR; INTRAVENOUS; SUBCUTANEOUS at 22:49

## 2023-10-21 RX ADMIN — HYDROMORPHONE HYDROCHLORIDE 0.5 MG: 1 INJECTION, SOLUTION INTRAMUSCULAR; INTRAVENOUS; SUBCUTANEOUS at 12:23

## 2023-10-21 RX ADMIN — VALPROATE SODIUM 500 MG: 100 INJECTION, SOLUTION INTRAVENOUS at 17:27

## 2023-10-21 RX ADMIN — HEPARIN SODIUM 5000 UNITS: 5000 INJECTION, SOLUTION INTRAVENOUS; SUBCUTANEOUS at 22:50

## 2023-10-21 RX ADMIN — MAGNESIUM SULFATE HEPTAHYDRATE 2 G: 2 INJECTION, SOLUTION INTRAVENOUS at 11:22

## 2023-10-21 RX ADMIN — DIAZEPAM 5 MG: 5 INJECTION, SOLUTION INTRAMUSCULAR; INTRAVENOUS at 15:16

## 2023-10-21 RX ADMIN — HEPARIN SODIUM 5000 UNITS: 5000 INJECTION, SOLUTION INTRAVENOUS; SUBCUTANEOUS at 06:19

## 2023-10-21 RX ADMIN — ONDANSETRON 4 MG: 2 INJECTION INTRAMUSCULAR; INTRAVENOUS at 15:17

## 2023-10-21 RX ADMIN — VALPROATE SODIUM 250 MG: 100 INJECTION, SOLUTION INTRAVENOUS at 06:18

## 2023-10-21 RX ADMIN — HYDROMORPHONE HYDROCHLORIDE 0.5 MG: 1 INJECTION, SOLUTION INTRAMUSCULAR; INTRAVENOUS; SUBCUTANEOUS at 00:03

## 2023-10-21 RX ADMIN — HEPARIN SODIUM 5000 UNITS: 5000 INJECTION, SOLUTION INTRAVENOUS; SUBCUTANEOUS at 14:55

## 2023-10-21 ASSESSMENT — ENCOUNTER SYMPTOMS
WHEEZING: 0
DEPRESSION: 0
BRUISES/BLEEDS EASILY: 0
ABDOMINAL PAIN: 1
VOMITING: 0
FEVER: 0
MYALGIAS: 0
HEMOPTYSIS: 0
PALPITATIONS: 0
COUGH: 0
HEARTBURN: 0
DIZZINESS: 0
CHILLS: 0
PND: 0
BACK PAIN: 0
CLAUDICATION: 0
BLURRED VISION: 0
NAUSEA: 0
HEADACHES: 0
DOUBLE VISION: 0

## 2023-10-21 ASSESSMENT — PAIN DESCRIPTION - PAIN TYPE
TYPE: ACUTE PAIN
TYPE: ACUTE PAIN
TYPE: ACUTE PAIN;CHRONIC PAIN
TYPE: ACUTE PAIN
TYPE: ACUTE PAIN;CHRONIC PAIN

## 2023-10-21 NOTE — CARE PLAN
The patient is Stable - Low risk of patient condition declining or worsening    Shift Goals  Clinical Goals: Pain mgnmt, d/c milton/voiding trial, rest  Patient Goals: Pain mgnmt, rest, comfort  Family Goals: madina    Progress made toward(s) clinical / shift goals:  Pt medicated for pain per MAR, milton removed to initiate voiding trial.     Problem: Pain - Standard  Goal: Alleviation of pain or a reduction in pain to the patient’s comfort goal  Outcome: Progressing     Problem: Knowledge Deficit - Standard  Goal: Patient and family/care givers will demonstrate understanding of plan of care, disease process/condition, diagnostic tests and medications  Outcome: Progressing     Problem: Psychosocial  Goal: Patient's level of anxiety will decrease  Outcome: Progressing  Goal: Patient's ability to verbalize feelings about condition will improve  Outcome: Progressing     Problem: Urinary Elimination  Goal: Establish and maintain regular urinary output  Outcome: Progressing     Problem: Fall Risk  Goal: Patient will remain free from falls  Outcome: Progressing

## 2023-10-21 NOTE — PROGRESS NOTES
Bladder scanned pt at 0630, 278 ml scanned. Pt had 2 incontinent episodes of urine, and 100 ml measured in hat.

## 2023-10-21 NOTE — PROGRESS NOTES
Pt sleeping and left undisturbed for beginning of shift; awoke at approx 23:30. A&Ox4 w/ some delusional/grandiose thoughts w/ pressured speech but agreeable and pleasant to staff. Pt cont to refuse all oral medications, medicated for pain and nausea throughout night per MAR. Sage removed for voiding trial, will monitor output and bladder scan this morning. 1:1 sitter at pt bedside, hourly rounding in place. No additional needs at this time.

## 2023-10-21 NOTE — PROGRESS NOTES
"Hospital Medicine Daily Progress Note    Date of Service  10/21/2023    Chief Complaint  Jana Overton is a 68 y.o. female admitted 10/12/2023 with abdominal pain, nausea    Hospital Course  68 y.o. female who presented 10/12/2023  with urinary retention, nausea.  Patient has a history of Crohn's disease status post colectomy with ileostomy on left side, hypertension, chronic pain.  She has had 2 recent emergency room visits due to worsening of chronic abdominal pain and difficulty tolerating p.o. intake with failure of p.o. Zofran at home.  On her last ED visit she had a borderline FLORES with creatinine increased to 1.5 from baseline of 1.2.  She was discharged home after some IV fluids.  She presents again due to concern for decreased urinary output.  She had a Sage placed at urology office for unclear reason however did not drain much urine.  Kidney function stable from last week however did have elevated lactic acid to 3.9.  After some IV fluids she is making some dark urine.  Due to concern for elevated lactic acid and inability to tolerate p.o. intake admission was requested.  CT abdomen unremarkable  Lactic acid 3.9 on admission, which has been resolved  ESR, CRP negative    Interval Problem Update  Seen patient at bedside  Patient is very hyperverbal and tangential.   Unable to answer questions appropriately  She refused to take po meds. State po meds will make her nausea, vomiting  However she is able to take po diet, however per patient she can only take one bite every 10 mins  She reports significant abdominal pain and leg cram. She has been talking in the room continuously. No sign of severe pain. Asking dialudid and valium iv. She states \"I used to inject iv dilaudid 4mg every 2h to my chest.\" \"I took valium 5mg for muscle spasm. If not working in 20 mins, I took another 5mg.\"    Paranoid and delirium  I have spoke to psychiatry, concerning delirium due to   I have spoke to psychiatry  Concerning " delirium due to Valium, I have reduced valium to 5mg bid prn  Ordered VitB1, B12, TSH  Discussed with GI      10/17 in bed, patient is new to me today, discussed with GI and psych team, continue supportive treatment will start depakote per psych recommendation, iv fluids, close monitoring. F/u t4 level.   10/18 patient is up and walking, legal hold stared per psych, I had long conversation with patient regarding plan of care, I discussed with GI and per GI team no plan for intervention during this admission, patient continues to refuse to eat or take po medications, I asked patient how she does at home and she said she is able to take meds at home w/o problems, I told her we need to try here in the hospital to see if she is able to tolerate po meds and foot and she refused she said she knows when she get back home she will be able to take po but not here in the hospital, she is asking for more iv diazepam and dilaudid.   10/19 patient is at nurse station, no fever or chills, she is been ambulating, still hyperverbal and tangential, discussed with psych team, they are recommended MRI brain, she agree with test.   10/20 in chair, she is about to have breakfast, she is in good spirit today, she is willing to eat, discussed with dietitian. Discussed with urology team, milton cath placed since patient had not urinated in 3 days, urine cx done and came back neg, per urology ok to remove milton and do voiding trial.   10/21 in chair, she is tolerating diet better, no new complains, increased valproic acid as recommended by psych, continue close monitoring. Replacing electrolytes Mg and phos.     I have discussed this patient's plan of care and discharge plan at IDT rounds today with Case Management, Nursing, Nursing leadership, and other members of the IDT team.    Consultants/Specialty  GI  Psych         Code Status  Full Code    Disposition  The patient is medically cleared for discharge to home or a post-acute  facility.  Anticipate discharge to: a psychiatric hospital    I have placed the appropriate orders for post-discharge needs.    Review of Systems  Review of Systems   Constitutional:  Negative for chills and fever.   Eyes:  Negative for blurred vision and double vision.   Respiratory:  Negative for cough, hemoptysis and wheezing.    Cardiovascular:  Negative for chest pain, palpitations, claudication, leg swelling and PND.   Gastrointestinal:  Positive for abdominal pain. Negative for heartburn, nausea and vomiting.   Genitourinary:  Negative for hematuria and urgency.   Musculoskeletal:  Negative for back pain and myalgias.   Skin:  Negative for rash.   Neurological:  Negative for dizziness and headaches.   Endo/Heme/Allergies:  Does not bruise/bleed easily.   Psychiatric/Behavioral:  Negative for depression.         Physical Exam  Temp:  [36 °C (96.8 °F)-36.8 °C (98.3 °F)] 36.1 °C (97 °F)  Pulse:  [] 109  Resp:  [16-20] 16  BP: (141-173)/(63-85) 147/81  SpO2:  [94 %-100 %] 98 %    Physical Exam  Vitals and nursing note reviewed.   Constitutional:       Appearance: Normal appearance. She is ill-appearing.   HENT:      Head: Normocephalic and atraumatic.      Nose: Nose normal.      Mouth/Throat:      Pharynx: Oropharynx is clear.   Eyes:      General:         Right eye: No discharge.         Left eye: No discharge.   Cardiovascular:      Rate and Rhythm: Normal rate and regular rhythm.      Pulses: Normal pulses.      Heart sounds: Normal heart sounds.   Pulmonary:      Effort: Pulmonary effort is normal.      Breath sounds: Normal breath sounds.   Abdominal:      General: Abdomen is flat. Bowel sounds are normal. There is no distension.      Palpations: Abdomen is soft.      Tenderness: There is no abdominal tenderness. There is no guarding.      Comments: Colostomy bag in place   Musculoskeletal:         General: Normal range of motion.      Cervical back: Normal range of motion and neck supple.   Skin:      General: Skin is warm and dry.   Neurological:      General: No focal deficit present.      Mental Status: She is alert and oriented to person, place, and time. Mental status is at baseline.   Psychiatric:      Comments: Anxious  Tangential thought process         Fluids    Intake/Output Summary (Last 24 hours) at 10/21/2023 1231  Last data filed at 10/21/2023 0400  Gross per 24 hour   Intake 342 ml   Output 100 ml   Net 242 ml         Laboratory  Recent Labs     10/19/23  1019 10/20/23  0801   WBC 7.3 5.8   RBC 4.18* 4.19*   HEMOGLOBIN 12.6 12.8   HEMATOCRIT 39.8 39.2   MCV 95.2 93.6   MCH 30.1 30.5   MCHC 31.7* 32.7   RDW 44.4 44.5   PLATELETCT 165 169   MPV 11.3 10.8       Recent Labs     10/19/23  1402 10/20/23  0801   SODIUM 143 140   POTASSIUM 3.4* 3.6   CHLORIDE 104 102   CO2 26 25   GLUCOSE 102* 104*   BUN 12 11   CREATININE 1.01 1.06   CALCIUM 9.7 10.3                     Imaging  IR-US GUIDED PIV   Final Result    Ultrasound-guided PERIPHERAL IV INSERTION performed by    qualified nursing staff as above.      IR-US GUIDED PIV   Final Result    Ultrasound-guided PERIPHERAL IV INSERTION performed by    qualified nursing staff as above.      IR-US GUIDED PIV   Final Result    Ultrasound-guided PERIPHERAL IV INSERTION performed by    qualified nursing staff as above.      DX-ESOPHAGUS BARIUM SWALLOW SINGLE CONTRAST   Final Result      1.  Distal esophageal lumen is narrowed down to less than 5 mm, likely representing a distal esophageal stricture. It does not widen during the examination.   2.  Several areas of minimal luminal narrowing of the proximal esophagus, down to 10 mm.   3.  Moderate mid and distal esophageal dysmotility.   4.  Gastroesophageal reflux.      CT-ABDOMEN-PELVIS WITH   Final Result      Prior colectomy with LEFT lower quadrant ileostomy   Catheter in place in the urinary bladder. The tip abuts the bladder dome.         MR-BRAIN-W/O    (Results Pending)        Assessment/Plan  * Lactic  acid acidosis- (present on admission)  Assessment & Plan  3.9 on presentation  In setting of dehydration, nausea/vomiting  IV fluid resuscitation  Trend lactic acid, have ordered repeat  Patient received IV fluid, lactic acidosis resolved  Monitoring.     Achalasia- (present on admission)  Assessment & Plan  Grade II  Per GI patient needs f/u as outpatient at tertiary center  Barium swallow eval showed:   1. Distal esophageal lumen is narrowed down to less than 5 mm, likely representing a distal esophageal stricture. It does not widen during the examination.  2.  Several areas of minimal luminal narrowing of the proximal esophagus, down to 10 mm.  3.  Moderate mid and distal esophageal dysmotility.  4.  Gastroesophageal reflux.    Per GI no plan for intervention at this time since patient appears to be at baseline and patient was able to tolerate barium for study w/o problems.     Patient was sitting at nurse station today trying to eat. Will monitor oral intake.   Patient is eating better. Patient is still eating very slow and improved oral intake.     Abnormal TSH  Assessment & Plan  Normal t3 and t4.   borderline low tsh.       Delirium  Assessment & Plan  hyperverbal and tangential. Unable to answer questions appropriately. Need frequent redirection. Unclear etiology, due to Valium?  I have consulted psych, will decrease valium to 5mg iv bid prn  Check B12, B1, TSH  Appreciated psych recommendations.     Discussed with psych team.  F/u t3, t4 is normal.   depakote iv qhs per psych.   Discussed with psych they are recommending MRI brain, patient agree with MRI.   Improved.     Hypophosphatemia  Assessment & Plan  Replaced as indicated with iv kphos  Replacing with iv phos.     Protein calorie malnutrition (HCC)  Assessment & Plan  Severe abdomina pain, nausea and vomiting, unable to oral diet for 1 to 2 weeks  I have ordered boost  Dietitian consult  Discussed with patient regaring plan of care, discussed  probably tube feeding PEG, would like dilatation first.       Abdominal pain  Assessment & Plan  Diffuse abdominal pain, nausea and vomiting in the setting of Crohn disease, status post colectomy  CT abdomen unremarkable for acute pathology.  ESR/CRP negative.  Denies bloody stool  Unclear etiology  Refused to take p.o. medications and diet. Requested iv dilaudid   GI rec caloproectin and speech evaluation, which are ordered  Multimodal pain managements including po and iv narcotics prn. Monitoring respiratory status and sedation score    1015: Patient reports abdominal pain, nausea and vomiting  Refuse to take po meds  She is able to take some of po diet  Pending esophagram   10/16: She reports significant abdominal pain, nausea and vomiting. Refused to take po meds  Esophagram reviewed  I have discussed with GI  Minimize narcotics use.     Continue c/o abdominal pain. Discussed with GI, they are recommending f/u as outpatient with tertiary center.   C/o abdominal pain but she is ambulating w/o any signs of pain or distress. Ostomy working.     Monitoring.   Improved.     Hypomagnesemia- (present on admission)  Assessment & Plan  Replaced.       Essential hypertension- (present on admission)  Assessment & Plan  Continue metoprolol, holding diuretics in setting of dehydration  Monitoring.     Tachycardia  Assessment & Plan  Has been persistently in 110's  In setting of dehydration  Have ordered twelve-lead EKG to further assess  Monitoring.   Stable.       Hypokalemia  Assessment & Plan  Refused po supplements  I have ordered iv kcl replacement  Monitoring.   F/u labs in am,     Dehydration  Assessment & Plan  With decreased urine output as well as decreased ostomy output  In setting of nausea and vomiting  IV fluids, monitor BMP and urine output, has started to make dark urine after initial IV fluid resuscitation  Antiemetics    Gently Iv fluids.  Needs to drink fluids  As per GI patient was able to drink contrast  for barium eval w/o problems.   Increasing oral intake.     Crohn's disease of small intestine with complication (HCC)- (present on admission)  Assessment & Plan  Has ostomy in place.  Follows with Dr. Davalos per patient  Recently completed Medrol Dosepak, will hold off on further steroids as unclear that there is current Crohn's flare given no ostomy output or findings of inflammation on CT abdomen  Check inflammatory markers ESR, CRP negative  No bloody bowel movement or obstruction, chance of acute Crohn small bowel flare is not high.   Ordered calprotectin fecal, patient refusing stool analysis.   Consulted GI, discussed with GI  calprotectin pending.   Discussed with GI today, no intervention planned.     calprotectin sent and pending.            VTE prophylaxis: heparin    I have performed a physical exam and reviewed and updated ROS and Plan today (10/21/2023). In review of yesterday's note (10/20/2023), there are no changes except as documented above.    My total time spent caring for the patient on the day of the encounter was 36 minutes.   This does not include time spent on separately billable procedures/tests.

## 2023-10-22 ENCOUNTER — APPOINTMENT (OUTPATIENT)
Dept: RADIOLOGY | Facility: MEDICAL CENTER | Age: 68
DRG: 699 | End: 2023-10-22
Attending: HOSPITALIST
Payer: MEDICARE

## 2023-10-22 PROBLEM — R33.9 URINARY RETENTION: Status: ACTIVE | Noted: 2023-10-22

## 2023-10-22 LAB
MAGNESIUM SERPL-MCNC: 2.1 MG/DL (ref 1.5–2.5)
PHOSPHATE SERPL-MCNC: 2.4 MG/DL (ref 2.5–4.5)

## 2023-10-22 PROCEDURE — 700111 HCHG RX REV CODE 636 W/ 250 OVERRIDE (IP): Mod: JZ | Performed by: STUDENT IN AN ORGANIZED HEALTH CARE EDUCATION/TRAINING PROGRAM

## 2023-10-22 PROCEDURE — 71045 X-RAY EXAM CHEST 1 VIEW: CPT

## 2023-10-22 PROCEDURE — 83735 ASSAY OF MAGNESIUM: CPT

## 2023-10-22 PROCEDURE — 84100 ASSAY OF PHOSPHORUS: CPT

## 2023-10-22 PROCEDURE — 36415 COLL VENOUS BLD VENIPUNCTURE: CPT

## 2023-10-22 PROCEDURE — 700111 HCHG RX REV CODE 636 W/ 250 OVERRIDE (IP): Performed by: STUDENT IN AN ORGANIZED HEALTH CARE EDUCATION/TRAINING PROGRAM

## 2023-10-22 PROCEDURE — 700105 HCHG RX REV CODE 258: Performed by: STUDENT IN AN ORGANIZED HEALTH CARE EDUCATION/TRAINING PROGRAM

## 2023-10-22 PROCEDURE — 99232 SBSQ HOSP IP/OBS MODERATE 35: CPT | Performed by: HOSPITALIST

## 2023-10-22 PROCEDURE — 700105 HCHG RX REV CODE 258: Performed by: HOSPITALIST

## 2023-10-22 PROCEDURE — 70250 X-RAY EXAM OF SKULL: CPT

## 2023-10-22 PROCEDURE — 700111 HCHG RX REV CODE 636 W/ 250 OVERRIDE (IP): Performed by: HOSPITALIST

## 2023-10-22 PROCEDURE — G0378 HOSPITAL OBSERVATION PER HR: HCPCS

## 2023-10-22 RX ADMIN — VALPROATE SODIUM 500 MG: 100 INJECTION, SOLUTION INTRAVENOUS at 18:02

## 2023-10-22 RX ADMIN — DIAZEPAM 5 MG: 5 INJECTION, SOLUTION INTRAMUSCULAR; INTRAVENOUS at 09:32

## 2023-10-22 RX ADMIN — VALPROATE SODIUM 250 MG: 100 INJECTION, SOLUTION INTRAVENOUS at 05:30

## 2023-10-22 RX ADMIN — HEPARIN SODIUM 5000 UNITS: 5000 INJECTION, SOLUTION INTRAVENOUS; SUBCUTANEOUS at 21:46

## 2023-10-22 RX ADMIN — DIAZEPAM 5 MG: 5 INJECTION, SOLUTION INTRAMUSCULAR; INTRAVENOUS at 18:01

## 2023-10-22 RX ADMIN — HYDROMORPHONE HYDROCHLORIDE 0.5 MG: 1 INJECTION, SOLUTION INTRAMUSCULAR; INTRAVENOUS; SUBCUTANEOUS at 07:50

## 2023-10-22 RX ADMIN — ONDANSETRON 4 MG: 2 INJECTION INTRAMUSCULAR; INTRAVENOUS at 18:43

## 2023-10-22 RX ADMIN — HYDROMORPHONE HYDROCHLORIDE 0.5 MG: 1 INJECTION, SOLUTION INTRAMUSCULAR; INTRAVENOUS; SUBCUTANEOUS at 16:26

## 2023-10-22 RX ADMIN — HEPARIN SODIUM 5000 UNITS: 5000 INJECTION, SOLUTION INTRAVENOUS; SUBCUTANEOUS at 13:53

## 2023-10-22 RX ADMIN — ONDANSETRON 4 MG: 2 INJECTION INTRAMUSCULAR; INTRAVENOUS at 13:51

## 2023-10-22 RX ADMIN — HEPARIN SODIUM 5000 UNITS: 5000 INJECTION, SOLUTION INTRAVENOUS; SUBCUTANEOUS at 06:00

## 2023-10-22 ASSESSMENT — ENCOUNTER SYMPTOMS
PALPITATIONS: 0
PND: 0
HEADACHES: 0
COUGH: 0
DIZZINESS: 0
ABDOMINAL PAIN: 1
BACK PAIN: 0
HEARTBURN: 0
WHEEZING: 0
BLURRED VISION: 0
FEVER: 0
MYALGIAS: 0
DEPRESSION: 0
VOMITING: 0
CHILLS: 0
DOUBLE VISION: 0
BRUISES/BLEEDS EASILY: 0
CLAUDICATION: 0
NAUSEA: 0
HEMOPTYSIS: 0

## 2023-10-22 ASSESSMENT — PAIN DESCRIPTION - PAIN TYPE
TYPE: ACUTE PAIN

## 2023-10-22 NOTE — ASSESSMENT & PLAN NOTE
Sage cath placed last night  Patient is allergic to sulfas can't take flomax  Will need to f/u with urologist as outpatient.

## 2023-10-22 NOTE — CARE PLAN
The patient is Stable - Low risk of patient condition declining or worsening    Problem: Urinary Elimination  Goal: Establish and maintain regular urinary output  Outcome: Progressing  Note: Patient will tolerate bladder scans and assessment in order to prevent bladder complications; patient will tolerate milton placement.      Problem: Fall Risk  Goal: Patient will remain free from falls  Outcome: Progressing  Note: Patient will remain free form falls and other accidents this shift. Safety protocols such as 1:1 sitter are in place.     Shift Goals  Clinical Goals: Monitor bladder and urinary output  Patient Goals: Pain control, rest  Family Goals: Not present    Progress made toward(s) clinical / shift goals:  safety achieved, education provided on milton     Patient is not progressing towards the following goals:      Problem: Knowledge Deficit - Standard  Goal: Patient and family/care givers will demonstrate understanding of plan of care, disease process/condition, diagnostic tests and medications  Outcome: Not Progressing  Note: This shift, patient will verbalize understanding of the importance of bladder scans and milton placement in order to prevent bladder complications.

## 2023-10-22 NOTE — PROGRESS NOTES
Patient unable to void, MD ordered catheter. Patient eating dinner and refusing at this time. Would like to finish dinner and try to void again. If unsuccessful a second time she will let rn place catheter.

## 2023-10-22 NOTE — PROGRESS NOTES
Patient unable to urinate by midnight. As agreed, milton placed as per order without difficulty. Patient tolerate very well and is understanding of necessity. Immediate 400mL return of yellow, slightly hazy urine.

## 2023-10-22 NOTE — PROGRESS NOTES
"Hospital Medicine Daily Progress Note    Date of Service  10/22/2023    Chief Complaint  Jana Overton is a 68 y.o. female admitted 10/12/2023 with abdominal pain, nausea    Hospital Course  68 y.o. female who presented 10/12/2023  with urinary retention, nausea.  Patient has a history of Crohn's disease status post colectomy with ileostomy on left side, hypertension, chronic pain.  She has had 2 recent emergency room visits due to worsening of chronic abdominal pain and difficulty tolerating p.o. intake with failure of p.o. Zofran at home.  On her last ED visit she had a borderline FLORES with creatinine increased to 1.5 from baseline of 1.2.  She was discharged home after some IV fluids.  She presents again due to concern for decreased urinary output.  She had a Sage placed at urology office for unclear reason however did not drain much urine.  Kidney function stable from last week however did have elevated lactic acid to 3.9.  After some IV fluids she is making some dark urine.  Due to concern for elevated lactic acid and inability to tolerate p.o. intake admission was requested.  CT abdomen unremarkable  Lactic acid 3.9 on admission, which has been resolved  ESR, CRP negative    Interval Problem Update  Seen patient at bedside  Patient is very hyperverbal and tangential.   Unable to answer questions appropriately  She refused to take po meds. State po meds will make her nausea, vomiting  However she is able to take po diet, however per patient she can only take one bite every 10 mins  She reports significant abdominal pain and leg cram. She has been talking in the room continuously. No sign of severe pain. Asking dialudid and valium iv. She states \"I used to inject iv dilaudid 4mg every 2h to my chest.\" \"I took valium 5mg for muscle spasm. If not working in 20 mins, I took another 5mg.\"    Paranoid and delirium  I have spoke to psychiatry, concerning delirium due to   I have spoke to psychiatry  Concerning " delirium due to Valium, I have reduced valium to 5mg bid prn  Ordered VitB1, B12, TSH  Discussed with GI      10/17 in bed, patient is new to me today, discussed with GI and psych team, continue supportive treatment will start depakote per psych recommendation, iv fluids, close monitoring. F/u t4 level.   10/18 patient is up and walking, legal hold stared per psych, I had long conversation with patient regarding plan of care, I discussed with GI and per GI team no plan for intervention during this admission, patient continues to refuse to eat or take po medications, I asked patient how she does at home and she said she is able to take meds at home w/o problems, I told her we need to try here in the hospital to see if she is able to tolerate po meds and foot and she refused she said she knows when she get back home she will be able to take po but not here in the hospital, she is asking for more iv diazepam and dilaudid.   10/19 patient is at nurse station, no fever or chills, she is been ambulating, still hyperverbal and tangential, discussed with psych team, they are recommended MRI brain, she agree with test.   10/20 in chair, she is about to have breakfast, she is in good spirit today, she is willing to eat, discussed with dietitian. Discussed with urology team, milton cath placed since patient had not urinated in 3 days, urine cx done and came back neg, per urology ok to remove milton and do voiding trial.   10/21 in chair, she is tolerating diet better, no new complains, increased valproic acid as recommended by psych, continue close monitoring. Replacing electrolytes Mg and phos.   10/22 in bed, milton in place, sitter at bedside, not in distress but asking for diazepam for muscle spasm, she has not received diazepam since yesterday discussed with nurse staff, she stated she is eating a little better.     I have discussed this patient's plan of care and discharge plan at IDT rounds today with Case Management,  Nursing, Nursing leadership, and other members of the IDT team.    Consultants/Specialty  GI  Psych         Code Status  Full Code    Disposition  The patient is medically cleared for discharge to home or a post-acute facility.  Anticipate discharge to: a psychiatric hospital    I have placed the appropriate orders for post-discharge needs.    Review of Systems  Review of Systems   Constitutional:  Negative for chills and fever.   Eyes:  Negative for blurred vision and double vision.   Respiratory:  Negative for cough, hemoptysis and wheezing.    Cardiovascular:  Negative for chest pain, palpitations, claudication, leg swelling and PND.   Gastrointestinal:  Positive for abdominal pain. Negative for heartburn, nausea and vomiting.   Genitourinary:  Negative for hematuria and urgency.   Musculoskeletal:  Negative for back pain and myalgias.   Skin:  Negative for rash.   Neurological:  Negative for dizziness and headaches.   Endo/Heme/Allergies:  Does not bruise/bleed easily.   Psychiatric/Behavioral:  Negative for depression.         Physical Exam  Temp:  [36.2 °C (97.2 °F)-36.6 °C (97.9 °F)] 36.3 °C (97.4 °F)  Pulse:  [81-97] 81  Resp:  [16-18] 16  BP: (133-150)/(52-78) 150/78  SpO2:  [95 %-97 %] 97 %    Physical Exam  Vitals and nursing note reviewed.   Constitutional:       Appearance: Normal appearance. She is ill-appearing.   HENT:      Head: Normocephalic and atraumatic.      Nose: Nose normal.      Mouth/Throat:      Pharynx: Oropharynx is clear.   Eyes:      General:         Right eye: No discharge.         Left eye: No discharge.   Cardiovascular:      Rate and Rhythm: Normal rate and regular rhythm.      Pulses: Normal pulses.      Heart sounds: Normal heart sounds.   Pulmonary:      Effort: Pulmonary effort is normal.      Breath sounds: Normal breath sounds.   Abdominal:      General: Abdomen is flat. Bowel sounds are normal. There is no distension.      Palpations: Abdomen is soft.      Tenderness: There  is no abdominal tenderness. There is no guarding.      Comments: Colostomy bag in place   Musculoskeletal:         General: Normal range of motion.      Cervical back: Normal range of motion and neck supple.   Skin:     General: Skin is warm and dry.   Neurological:      General: No focal deficit present.      Mental Status: She is alert and oriented to person, place, and time. Mental status is at baseline.   Psychiatric:      Comments: Anxious  Tangential thought process         Fluids  No intake or output data in the 24 hours ending 10/22/23 1120      Laboratory  Recent Labs     10/20/23  0801   WBC 5.8   RBC 4.19*   HEMOGLOBIN 12.8   HEMATOCRIT 39.2   MCV 93.6   MCH 30.5   MCHC 32.7   RDW 44.5   PLATELETCT 169   MPV 10.8       Recent Labs     10/19/23  1402 10/20/23  0801   SODIUM 143 140   POTASSIUM 3.4* 3.6   CHLORIDE 104 102   CO2 26 25   GLUCOSE 102* 104*   BUN 12 11   CREATININE 1.01 1.06   CALCIUM 9.7 10.3                     Imaging  IR-US GUIDED PIV   Final Result    Ultrasound-guided PERIPHERAL IV INSERTION performed by    qualified nursing staff as above.      IR-US GUIDED PIV   Final Result    Ultrasound-guided PERIPHERAL IV INSERTION performed by    qualified nursing staff as above.      IR-US GUIDED PIV   Final Result    Ultrasound-guided PERIPHERAL IV INSERTION performed by    qualified nursing staff as above.      DX-ESOPHAGUS BARIUM SWALLOW SINGLE CONTRAST   Final Result      1.  Distal esophageal lumen is narrowed down to less than 5 mm, likely representing a distal esophageal stricture. It does not widen during the examination.   2.  Several areas of minimal luminal narrowing of the proximal esophagus, down to 10 mm.   3.  Moderate mid and distal esophageal dysmotility.   4.  Gastroesophageal reflux.      CT-ABDOMEN-PELVIS WITH   Final Result      Prior colectomy with LEFT lower quadrant ileostomy   Catheter in place in the urinary bladder. The tip abuts the bladder dome.         MR-BRAIN-W/O     (Results Pending)        Assessment/Plan  * Lactic acid acidosis- (present on admission)  Assessment & Plan  3.9 on presentation  In setting of dehydration, nausea/vomiting  IV fluid resuscitation  Trend lactic acid, have ordered repeat  Patient received IV fluid, lactic acidosis resolved  Monitoring.     Urinary retention- (present on admission)  Assessment & Plan  Sage cath placed last night  Patient is allergic to sulfas can't take flomax  Will need to f/u with urologist as outpatient.     Achalasia- (present on admission)  Assessment & Plan  Grade II  Per GI patient needs f/u as outpatient at tertiary center  Barium swallow eval showed:   1. Distal esophageal lumen is narrowed down to less than 5 mm, likely representing a distal esophageal stricture. It does not widen during the examination.  2.  Several areas of minimal luminal narrowing of the proximal esophagus, down to 10 mm.  3.  Moderate mid and distal esophageal dysmotility.  4.  Gastroesophageal reflux.    Per GI no plan for intervention at this time since patient appears to be at baseline and patient was able to tolerate barium for study w/o problems.     Patient was sitting at nurse station today trying to eat. Will monitor oral intake.   Patient is eating better. Patient is still eating very slow and improved oral intake.     Abnormal TSH  Assessment & Plan  Normal t3 and t4.   borderline low tsh.       Delirium  Assessment & Plan  hyperverbal and tangential. Unable to answer questions appropriately. Need frequent redirection. Unclear etiology, due to Valium?  I have consulted psych, will decrease valium to 5mg iv bid prn  Check B12, B1, TSH  Appreciated psych recommendations.     Discussed with psych team.  F/u t3, t4 is normal.   depakote iv qhs per psych.   Discussed with psych they are recommending MRI brain, patient agree with MRI.   Improved.     Hypophosphatemia  Assessment & Plan  Replaced as indicated with iv kphos  Replacing with iv phos.      Protein calorie malnutrition (HCC)  Assessment & Plan  Severe abdomina pain, nausea and vomiting, unable to oral diet for 1 to 2 weeks  I have ordered boost  Dietitian consult  Discussed with patient regaring plan of care, discussed probably tube feeding PEG, would like dilatation first.       Abdominal pain  Assessment & Plan  Diffuse abdominal pain, nausea and vomiting in the setting of Crohn disease, status post colectomy  CT abdomen unremarkable for acute pathology.  ESR/CRP negative.  Denies bloody stool  Unclear etiology  Refused to take p.o. medications and diet. Requested iv dilaudid   GI rec caloproectin and speech evaluation, which are ordered  Multimodal pain managements including po and iv narcotics prn. Monitoring respiratory status and sedation score    1015: Patient reports abdominal pain, nausea and vomiting  Refuse to take po meds  She is able to take some of po diet  Pending esophagram   10/16: She reports significant abdominal pain, nausea and vomiting. Refused to take po meds  Esophagram reviewed  I have discussed with GI  Minimize narcotics use.     Continue c/o abdominal pain. Discussed with GI, they are recommending f/u as outpatient with tertiary center.   C/o abdominal pain but she is ambulating w/o any signs of pain or distress. Ostomy working.     Monitoring.   Improved.     Hypomagnesemia- (present on admission)  Assessment & Plan  Replaced.       Essential hypertension- (present on admission)  Assessment & Plan  Continue metoprolol, holding diuretics in setting of dehydration  Monitoring.     Tachycardia  Assessment & Plan  Has been persistently in 110's  In setting of dehydration  Have ordered twelve-lead EKG to further assess  Monitoring.   Stable.       Hypokalemia  Assessment & Plan  Refused po supplements  I have ordered iv kcl replacement  Monitoring.   F/u labs in am,     Dehydration  Assessment & Plan  With decreased urine output as well as decreased ostomy output  In setting  of nausea and vomiting  IV fluids, monitor BMP and urine output, has started to make dark urine after initial IV fluid resuscitation  Antiemetics    Gently Iv fluids.  Needs to drink fluids  As per GI patient was able to drink contrast for barium eval w/o problems.   Increasing oral intake.     Crohn's disease of small intestine with complication (HCC)- (present on admission)  Assessment & Plan  Has ostomy in place.  Follows with Dr. Davalos per patient  Recently completed Medrol Dosepak, will hold off on further steroids as unclear that there is current Crohn's flare given no ostomy output or findings of inflammation on CT abdomen  Check inflammatory markers ESR, CRP negative  No bloody bowel movement or obstruction, chance of acute Crohn small bowel flare is not high.   Ordered calprotectin fecal, patient refusing stool analysis.   Consulted GI, discussed with GI  calprotectin pending.   Discussed with GI today, no intervention planned.     calprotectin low.            VTE prophylaxis: heparin    I have performed a physical exam and reviewed and updated ROS and Plan today (10/22/2023). In review of yesterday's note (10/21/2023), there are no changes except as documented above.    My total time spent caring for the patient on the day of the encounter was 37 minutes.   This does not include time spent on separately billable procedures/tests.

## 2023-10-22 NOTE — PROGRESS NOTES
"Assumed care of patient. Assessment performed. Patient is declining any oral medications, reporting \"my doctor said I couldn't because they get stuck in my throat and never leave\". Patient offered PAOLO hose to help with edematous bilateral lower extremities, but refused because \"they need to be washed and I have been washing them\". 1:1 sitter at bedside. Will continue to monitor urinary output. Bladder scans initiated. Milton placement discussed with patient and she is agreeable. Patient offered the following compromise: milton placement at midnight, to allow for some time for her to urinate on own before.  "

## 2023-10-23 ENCOUNTER — APPOINTMENT (OUTPATIENT)
Dept: RADIOLOGY | Facility: MEDICAL CENTER | Age: 68
DRG: 699 | End: 2023-10-23
Attending: HOSPITALIST
Payer: MEDICARE

## 2023-10-23 LAB — PHOSPHATE SERPL-MCNC: 3.1 MG/DL (ref 2.5–4.5)

## 2023-10-23 PROCEDURE — 70553 MRI BRAIN STEM W/O & W/DYE: CPT

## 2023-10-23 PROCEDURE — G0378 HOSPITAL OBSERVATION PER HR: HCPCS

## 2023-10-23 PROCEDURE — 700111 HCHG RX REV CODE 636 W/ 250 OVERRIDE (IP): Performed by: STUDENT IN AN ORGANIZED HEALTH CARE EDUCATION/TRAINING PROGRAM

## 2023-10-23 PROCEDURE — 700105 HCHG RX REV CODE 258: Performed by: STUDENT IN AN ORGANIZED HEALTH CARE EDUCATION/TRAINING PROGRAM

## 2023-10-23 PROCEDURE — 700105 HCHG RX REV CODE 258: Performed by: HOSPITALIST

## 2023-10-23 PROCEDURE — 700111 HCHG RX REV CODE 636 W/ 250 OVERRIDE (IP): Performed by: HOSPITALIST

## 2023-10-23 PROCEDURE — 84100 ASSAY OF PHOSPHORUS: CPT

## 2023-10-23 PROCEDURE — 99231 SBSQ HOSP IP/OBS SF/LOW 25: CPT | Performed by: STUDENT IN AN ORGANIZED HEALTH CARE EDUCATION/TRAINING PROGRAM

## 2023-10-23 PROCEDURE — 36415 COLL VENOUS BLD VENIPUNCTURE: CPT

## 2023-10-23 PROCEDURE — 700117 HCHG RX CONTRAST REV CODE 255: Performed by: HOSPITALIST

## 2023-10-23 PROCEDURE — 99232 SBSQ HOSP IP/OBS MODERATE 35: CPT | Performed by: HOSPITALIST

## 2023-10-23 PROCEDURE — A9579 GAD-BASE MR CONTRAST NOS,1ML: HCPCS | Performed by: HOSPITALIST

## 2023-10-23 RX ADMIN — HYDROMORPHONE HYDROCHLORIDE 0.5 MG: 1 INJECTION, SOLUTION INTRAMUSCULAR; INTRAVENOUS; SUBCUTANEOUS at 00:29

## 2023-10-23 RX ADMIN — HEPARIN SODIUM 5000 UNITS: 5000 INJECTION, SOLUTION INTRAVENOUS; SUBCUTANEOUS at 21:46

## 2023-10-23 RX ADMIN — DIAZEPAM 5 MG: 5 INJECTION, SOLUTION INTRAMUSCULAR; INTRAVENOUS at 16:01

## 2023-10-23 RX ADMIN — VALPROATE SODIUM 250 MG: 100 INJECTION, SOLUTION INTRAVENOUS at 05:43

## 2023-10-23 RX ADMIN — HYDROMORPHONE HYDROCHLORIDE 0.5 MG: 1 INJECTION, SOLUTION INTRAMUSCULAR; INTRAVENOUS; SUBCUTANEOUS at 18:07

## 2023-10-23 RX ADMIN — HEPARIN SODIUM 5000 UNITS: 5000 INJECTION, SOLUTION INTRAVENOUS; SUBCUTANEOUS at 15:54

## 2023-10-23 RX ADMIN — GADOTERIDOL 15 ML: 279.3 INJECTION, SOLUTION INTRAVENOUS at 14:17

## 2023-10-23 RX ADMIN — HEPARIN SODIUM 5000 UNITS: 5000 INJECTION, SOLUTION INTRAVENOUS; SUBCUTANEOUS at 05:45

## 2023-10-23 RX ADMIN — DIAZEPAM 5 MG: 5 INJECTION, SOLUTION INTRAMUSCULAR; INTRAVENOUS at 06:27

## 2023-10-23 RX ADMIN — HYDROMORPHONE HYDROCHLORIDE 0.5 MG: 1 INJECTION, SOLUTION INTRAMUSCULAR; INTRAVENOUS; SUBCUTANEOUS at 09:17

## 2023-10-23 RX ADMIN — VALPROATE SODIUM 500 MG: 100 INJECTION, SOLUTION INTRAVENOUS at 18:11

## 2023-10-23 ASSESSMENT — PAIN DESCRIPTION - PAIN TYPE
TYPE: ACUTE PAIN

## 2023-10-23 ASSESSMENT — ENCOUNTER SYMPTOMS
DEPRESSION: 0
NAUSEA: 0
BLURRED VISION: 0
COUGH: 0
DIZZINESS: 0
PALPITATIONS: 0
FEVER: 0
WHEEZING: 0
ABDOMINAL PAIN: 1
CLAUDICATION: 0
CHILLS: 0
DOUBLE VISION: 0
HEADACHES: 0
HEARTBURN: 0
BRUISES/BLEEDS EASILY: 0
MYALGIAS: 0
VOMITING: 0
PND: 0
BACK PAIN: 0
HEMOPTYSIS: 0

## 2023-10-23 NOTE — PROGRESS NOTES
"Hospital Medicine Daily Progress Note    Date of Service  10/23/2023    Chief Complaint  Jana Overton is a 68 y.o. female admitted 10/12/2023 with abdominal pain, nausea    Hospital Course  68 y.o. female who presented 10/12/2023  with urinary retention, nausea.  Patient has a history of Crohn's disease status post colectomy with ileostomy on left side, hypertension, chronic pain.  She has had 2 recent emergency room visits due to worsening of chronic abdominal pain and difficulty tolerating p.o. intake with failure of p.o. Zofran at home.  On her last ED visit she had a borderline FLORES with creatinine increased to 1.5 from baseline of 1.2.  She was discharged home after some IV fluids.  She presents again due to concern for decreased urinary output.  She had a Sage placed at urology office for unclear reason however did not drain much urine.  Kidney function stable from last week however did have elevated lactic acid to 3.9.  After some IV fluids she is making some dark urine.  Due to concern for elevated lactic acid and inability to tolerate p.o. intake admission was requested.  CT abdomen unremarkable  Lactic acid 3.9 on admission, which has been resolved  ESR, CRP negative    Interval Problem Update  Seen patient at bedside  Patient is very hyperverbal and tangential.   Unable to answer questions appropriately  She refused to take po meds. State po meds will make her nausea, vomiting  However she is able to take po diet, however per patient she can only take one bite every 10 mins  She reports significant abdominal pain and leg cram. She has been talking in the room continuously. No sign of severe pain. Asking dialudid and valium iv. She states \"I used to inject iv dilaudid 4mg every 2h to my chest.\" \"I took valium 5mg for muscle spasm. If not working in 20 mins, I took another 5mg.\"    Paranoid and delirium  I have spoke to psychiatry, concerning delirium due to   I have spoke to psychiatry  Concerning " delirium due to Valium, I have reduced valium to 5mg bid prn  Ordered VitB1, B12, TSH  Discussed with GI      10/17 in bed, patient is new to me today, discussed with GI and psych team, continue supportive treatment will start depakote per psych recommendation, iv fluids, close monitoring. F/u t4 level.   10/18 patient is up and walking, legal hold stared per psych, I had long conversation with patient regarding plan of care, I discussed with GI and per GI team no plan for intervention during this admission, patient continues to refuse to eat or take po medications, I asked patient how she does at home and she said she is able to take meds at home w/o problems, I told her we need to try here in the hospital to see if she is able to tolerate po meds and foot and she refused she said she knows when she get back home she will be able to take po but not here in the hospital, she is asking for more iv diazepam and dilaudid.   10/19 patient is at nurse station, no fever or chills, she is been ambulating, still hyperverbal and tangential, discussed with psych team, they are recommended MRI brain, she agree with test.   10/20 in chair, she is about to have breakfast, she is in good spirit today, she is willing to eat, discussed with dietitian. Discussed with urology team, milton cath placed since patient had not urinated in 3 days, urine cx done and came back neg, per urology ok to remove milton and do voiding trial.   10/21 in chair, she is tolerating diet better, no new complains, increased valproic acid as recommended by psych, continue close monitoring. Replacing electrolytes Mg and phos.   10/22 in bed, milton in place, sitter at bedside, not in distress but asking for diazepam for muscle spasm, she has not received diazepam since yesterday discussed with nurse staff, she stated she is eating a little better.   10/23 patient in bed, c/o back pain, had a long discussion with patient regarding her plan of care, patient  complaining about her room being too cold and dirty, I discussed with psych and GI, MRI brain with and without ordered, discussed with nurse staff and CM.           I have discussed this patient's plan of care and discharge plan at IDT rounds today with Case Management, Nursing, Nursing leadership, and other members of the IDT team.    Consultants/Specialty  GI  Psych   Neuro.         Code Status  Full Code    Disposition  The patient is not medically cleared for discharge to home or a post-acute facility.      I have placed the appropriate orders for post-discharge needs.    Review of Systems  Review of Systems   Constitutional:  Negative for chills and fever.   Eyes:  Negative for blurred vision and double vision.   Respiratory:  Negative for cough, hemoptysis and wheezing.    Cardiovascular:  Negative for chest pain, palpitations, claudication, leg swelling and PND.   Gastrointestinal:  Positive for abdominal pain. Negative for heartburn, nausea and vomiting.   Genitourinary:  Negative for hematuria and urgency.   Musculoskeletal:  Negative for back pain and myalgias.   Skin:  Negative for rash.   Neurological:  Negative for dizziness and headaches.   Endo/Heme/Allergies:  Does not bruise/bleed easily.   Psychiatric/Behavioral:  Negative for depression.         Physical Exam  Temp:  [36 °C (96.8 °F)-36.9 °C (98.4 °F)] 36.4 °C (97.6 °F)  Pulse:  [] 96  Resp:  [16-19] 16  BP: (125-149)/(60-85) 149/85  SpO2:  [96 %-100 %] 96 %    Physical Exam  Vitals and nursing note reviewed.   Constitutional:       Appearance: Normal appearance. She is ill-appearing.   HENT:      Head: Normocephalic and atraumatic.      Nose: Nose normal.      Mouth/Throat:      Pharynx: Oropharynx is clear.   Eyes:      General:         Right eye: No discharge.         Left eye: No discharge.   Cardiovascular:      Rate and Rhythm: Normal rate and regular rhythm.      Pulses: Normal pulses.      Heart sounds: Normal heart sounds.    Pulmonary:      Effort: Pulmonary effort is normal.      Breath sounds: Normal breath sounds.   Abdominal:      General: Abdomen is flat. Bowel sounds are normal. There is no distension.      Palpations: Abdomen is soft.      Tenderness: There is no abdominal tenderness. There is no guarding.      Comments: Colostomy bag in place   Musculoskeletal:         General: Normal range of motion.      Cervical back: Normal range of motion and neck supple.   Skin:     General: Skin is warm and dry.   Neurological:      General: No focal deficit present.      Mental Status: She is alert and oriented to person, place, and time. Mental status is at baseline.   Psychiatric:      Comments: Anxious  Tangential thought process         Fluids    Intake/Output Summary (Last 24 hours) at 10/23/2023 1312  Last data filed at 10/23/2023 0917  Gross per 24 hour   Intake 1200 ml   Output 300 ml   Net 900 ml         Laboratory                            Imaging  DX-SKULL-LIMITED 3-   Final Result      No metallic foreign bodies or devices are identified.      DX-CHEST-LIMITED (1 VIEW)   Final Result      1.  No acute cardiopulmonary abnormality identified.      2.  No contraindicated device      3.  Right midlung zone granuloma      IR-US GUIDED PIV   Final Result    Ultrasound-guided PERIPHERAL IV INSERTION performed by    qualified nursing staff as above.      IR-US GUIDED PIV   Final Result    Ultrasound-guided PERIPHERAL IV INSERTION performed by    qualified nursing staff as above.      IR-US GUIDED PIV   Final Result    Ultrasound-guided PERIPHERAL IV INSERTION performed by    qualified nursing staff as above.      DX-ESOPHAGUS BARIUM SWALLOW SINGLE CONTRAST   Final Result      1.  Distal esophageal lumen is narrowed down to less than 5 mm, likely representing a distal esophageal stricture. It does not widen during the examination.   2.  Several areas of minimal luminal narrowing of the proximal esophagus, down to 10 mm.   3.   Moderate mid and distal esophageal dysmotility.   4.  Gastroesophageal reflux.      CT-ABDOMEN-PELVIS WITH   Final Result      Prior colectomy with LEFT lower quadrant ileostomy   Catheter in place in the urinary bladder. The tip abuts the bladder dome.         MR-BRAIN-WITH & W/O    (Results Pending)        Assessment/Plan  * Lactic acid acidosis- (present on admission)  Assessment & Plan  3.9 on presentation  In setting of dehydration, nausea/vomiting  IV fluid resuscitation  Trend lactic acid, have ordered repeat  Patient received IV fluid, lactic acidosis resolved  Monitoring.     Urinary retention- (present on admission)  Assessment & Plan  Sage cath placed last night  Patient is allergic to sulfas can't take flomax  Will need to f/u with urologist as outpatient.     Achalasia- (present on admission)  Assessment & Plan  Grade II  Per GI patient needs f/u as outpatient at tertiary center  Barium swallow eval showed:   1. Distal esophageal lumen is narrowed down to less than 5 mm, likely representing a distal esophageal stricture. It does not widen during the examination.  2.  Several areas of minimal luminal narrowing of the proximal esophagus, down to 10 mm.  3.  Moderate mid and distal esophageal dysmotility.  4.  Gastroesophageal reflux.    Per GI no plan for intervention at this time since patient appears to be at baseline and patient was able to tolerate barium for study w/o problems.     Patient was sitting at nurse station today trying to eat. Will monitor oral intake.   Patient is eating better. Patient is still eating very slow and improved oral intake.     Abnormal TSH  Assessment & Plan  Normal t3 and t4.   borderline low tsh.       Delirium  Assessment & Plan  hyperverbal and tangential. Unable to answer questions appropriately. Need frequent redirection. Unclear etiology, due to Valium?  I have consulted psych, will decrease valium to 5mg iv bid prn  Check B12, B1, TSH  Appreciated psych  recommendations.     Discussed with psych team.  F/u t3, t4 is normal.   depakote iv qhs per psych.   Discussed with psych they are recommending MRI brain, patient agree with MRI.   Improved.     Hypophosphatemia  Assessment & Plan  Replaced as indicated with iv kphos  Replacing with iv phos.     Protein calorie malnutrition (HCC)  Assessment & Plan  Severe abdomina pain, nausea and vomiting, unable to oral diet for 1 to 2 weeks  I have ordered boost  Dietitian consult  Discussed with patient regaring plan of care, discussed probably tube feeding PEG, would like dilatation first.       Abdominal pain  Assessment & Plan  Diffuse abdominal pain, nausea and vomiting in the setting of Crohn disease, status post colectomy  CT abdomen unremarkable for acute pathology.  ESR/CRP negative.  Denies bloody stool  Unclear etiology  Refused to take p.o. medications and diet. Requested iv dilaudid   GI rec caloproectin and speech evaluation, which are ordered  Multimodal pain managements including po and iv narcotics prn. Monitoring respiratory status and sedation score    1015: Patient reports abdominal pain, nausea and vomiting  Refuse to take po meds  She is able to take some of po diet  Pending esophagram   10/16: She reports significant abdominal pain, nausea and vomiting. Refused to take po meds  Esophagram reviewed  I have discussed with GI  Minimize narcotics use.     Continue c/o abdominal pain. Discussed with GI, they are recommending f/u as outpatient with tertiary center.   C/o abdominal pain but she is ambulating w/o any signs of pain or distress. Ostomy working.     Monitoring.   Improved.     Hypomagnesemia- (present on admission)  Assessment & Plan  Replaced.       Essential hypertension- (present on admission)  Assessment & Plan  Continue metoprolol, holding diuretics in setting of dehydration  Monitoring.     Tachycardia  Assessment & Plan  Has been persistently in 110's  In setting of dehydration  Have ordered  twelve-lead EKG to further assess  Monitoring.   Stable.       Hypokalemia  Assessment & Plan  Refused po supplements  I have ordered iv kcl replacement  Monitoring.   F/u labs in am,     Dehydration  Assessment & Plan  With decreased urine output as well as decreased ostomy output  In setting of nausea and vomiting  IV fluids, monitor BMP and urine output, has started to make dark urine after initial IV fluid resuscitation  Antiemetics    Gently Iv fluids.  Needs to drink fluids  As per GI patient was able to drink contrast for barium eval w/o problems.   Increasing oral intake.     Crohn's disease of small intestine with complication (HCC)- (present on admission)  Assessment & Plan  Has ostomy in place.  Follows with Dr. Davalos per patient  Recently completed Medrol Dosepak, will hold off on further steroids as unclear that there is current Crohn's flare given no ostomy output or findings of inflammation on CT abdomen  Check inflammatory markers ESR, CRP negative  No bloody bowel movement or obstruction, chance of acute Crohn small bowel flare is not high.   Ordered calprotectin fecal, patient refusing stool analysis.   Consulted GI, discussed with GI  calprotectin pending.   Discussed with GI today, no intervention planned.     calprotectin low.            VTE prophylaxis: heparin    I have performed a physical exam and reviewed and updated ROS and Plan today (10/23/2023). In review of yesterday's note (10/22/2023), there are no changes except as documented above.    My total time spent caring for the patient on the day of the encounter was 40 minutes.   This does not include time spent on separately billable procedures/tests.

## 2023-10-23 NOTE — PROGRESS NOTES
GI team Aminah and Dr. Davalos went together to see the patient at S6.     The patient was not in the room and in the end we found out she was at MRI.     Regarding her dysphagia, we scope her twice recently, no much mucosal narrowing we could address/dilate endoscopically. Two empiric dilatation did not appreciate much resistance.     The record from GI consultant showed type 2 achalasia, which is refractory to our dilatation. The next step should be possible myotomy at a tertiary medical center. (POEM).     In short, the GI plan is not changed, the patient should maintain hydration and nutrition by mouth or consider short term feeding tube, further inpatient EGD would not offer much benefit.

## 2023-10-23 NOTE — CONSULTS
Neurology Initial Consult H&P  Neurohospitalist Service, [facility]    Referring Physician: CRISTO Colón.*    Chief Complaint   Patient presents with    Urinary Retention     Since 3 days associated with generalized body pain and abdominal pain  She has milton catheter in place this morning agatha urologist office, but no urine output since then    Alert and Oriented x 4  Using cane for ambulation       HPI:   Patient is a 69yo female with PMH of Crohn's disease s/p ileostomy, HTN, and chronic pain that presented 10/12/23 with urinary retention and nausea. Psychiatry consulted 10/16/23 with concern for delirium which has since resolved, as well as kely. During her hospitalization she has been noted to be markedly hyperverbal and tangential with disorganized thoughts, delusions, and grandiosity, making statements about her qualifications and expertise in various areas. Patient currently under legal hold.     Neurology consulted for possible autoimmune encephalitis. There has been no noted speech difficulty other than pressured speech (slurred speech/hesitancy/aphasia), seizures, dyskinesias, or decreased level of arousal.     During my evaluation, the patient denies any mental status changes and claims to be her normal self. Sitter is at the bedside. She is A&Ox4 and answering all questions appropriately. Patient is not tangential and is not speaking excessively. Patient states that they are not sure as to why they are under a legal hold. Denies any confusion or speech disturbances. States that they've been taking hydroxychloroquine for more than a year now.    Review of systems: In addition to what is detailed in the HPI above, (and scanned into the chart if and when applicable), all other systems reviewed and are negative.    Past Medical History:    has a past medical history of Anesthesia, Anxiety, Arthritis, ASTHMA, Atrial fib/flut, Backpain, Bronchitis, Chronic pain, Colostomy in  "place ([facility]), COPD (chronic obstructive pulmonary disease) ([facility]), Crohn's disease of colon ([facility]), Dyspnea, History of cardiac murmur, Ileostomy present ([facility]), Infectious disease, Multiple falls, Narcotic dependence ([facility]), Obstruction, Pain, Pneumonia, Postherpetic neuralgia, Rosacea, and Sleep apnea.    She has no past medical history of CAD (coronary artery disease), Liver disease, or Seizure disorder ([facility]).    FHx:  family history includes Cancer (age of onset: 40) in her maternal aunt; Diabetes in her father and sister; Heart Disease in her father; Lung Disease in her mother.    SHx:   reports that she has never smoked. She has never used smokeless tobacco. She reports that she does not currently use alcohol after a past usage of about 0.6 oz of alcohol per week. She reports current drug use. Drugs: Marijuana and Inhaled.    Allergies:  Allergies   Allergen Reactions    Demerol Hcl [Meperidine] Shortness of Breath and Palpitations    Methotrexate Hives, Shortness of Breath and Rash          Morphine Shortness of Breath and Palpitations    Promethazine Shortness of Breath and Rash          Remicade [Infliximab] Hives, Shortness of Breath and Rash          Sudafed [Pseudoephedrine] Shortness of Breath, Vomiting and Palpitations    Azathioprine Sodium Hives and Shortness of Breath     10/21/2022 - patient unable to verify allergy/reaction  Historical reaction listed: Hives, Shortness of Breath    Carafate [Sucralfate] Vomiting and Nausea     + Mouth Sores    Other Drug Unspecified     \"STEROIDS\" - REACTION NOT SPECIFIED    Sulfa Drugs Rash          Sulfasalazine Rash          Amitriptyline Unspecified     Nightmares      Ativan [Lorazepam] Unspecified     Nightmares    Betamethasone Unspecified     10/21/2022 - patient unable to verify allergy/reaction  No historical reaction listed    Fentanyl Unspecified     \"Patches didn't work\" and skin broke down    Lyrica [Pregabalin] Nausea          Methadone Unspecified     " "\"Didn't work\"    Potassium Unspecified     Notes: In IV only  REACTION NOT SPECIFIED    Tizanidine Unspecified     10/21/2022 - patient unable to verify allergy/reaction  No historical reaction listed       Medications:    Current Facility-Administered Medications:     SUMAtriptan (Imitrex) tablet 100 mg, 100 mg, Oral, Q2HRS PRN, MARCO Fitch    valproate (Depacon) 250 mg in dextrose 5% 50 mL IVPB, 250 mg, Intravenous, QAM, Rod Valencia M.D., Stopped at 10/23/23 0643    diazePAM (Valium) injection 5 mg, 5 mg, Intravenous, Q8HRS PRN, Rod Valencia M.D., 5 mg at 10/23/23 0627    oxyCODONE immediate-release (Roxicodone) tablet 5 mg, 5 mg, Oral, Q3HRS PRN **OR** oxyCODONE immediate release (Roxicodone) tablet 10 mg, 10 mg, Oral, Q3HRS PRN **OR** HYDROmorphone (Dilaudid) injection 0.5 mg, 0.5 mg, Intravenous, Q8HRS PRN, Rod Valencia M.D., 0.5 mg at 10/23/23 0917    valproate (Depacon) 500 mg in dextrose 5% 50 mL IVPB, 500 mg, Intravenous, Q EVENING, Kim Akers D.O., Stopped at 10/22/23 1902    heparin injection 5,000 Units, 5,000 Units, Subcutaneous, Q8HRS, Leonel Rodriguez M.D., 5,000 Units at 10/23/23 0545    Pharmacy Consult Request ...Pain Management Review 1 Each, 1 Each, Other, PHARMACY TO DOSE, Leonel Rodriguez M.D.    [] acetaminophen (Tylenol) tablet 650 mg, 650 mg, Oral, Q6HRS, 650 mg at 10/13/23 0613 **FOLLOWED BY** acetaminophen (Tylenol) tablet 650 mg, 650 mg, Oral, Q6HRS PRN, Leonel Rodriguez M.D.    ondansetron (Zofran) syringe/vial injection 4 mg, 4 mg, Intravenous, Q4HRS PRN, Leonel Rodriguez M.D., 4 mg at 10/22/23 1843    hydroxychloroquine (Plaquenil) tablet 300 mg, 300 mg, Oral, DAILY, Leonel Rodriguez M.D.    levalbuterol (Xopenex) 0.63 MG/3ML nebulizer solution 0.63 mg, 3 mL, Inhalation, Q4HRS PRN, Leonel Rodriguez M.D.    metoprolol tartrate (Lopressor) tablet 50 mg, 50 mg, Oral, BID, Leonel Rodriguez M.D.    Physical " Examination:     Vitals:    10/22/23 2146 10/23/23 0438 10/23/23 0800 10/23/23 0917   BP:  134/60 (!) 149/85    Pulse:  78 96    Resp: 18 16 16 16   Temp:  36 °C (96.8 °F) 36.4 °C (97.6 °F)    TempSrc:  Temporal Temporal    SpO2:  98% 96%    Weight:       Height:           General: Patient is awake and in no acute distress  Eyes: examination of optic disks not indicated at this time  CV: RRR    NEUROLOGICAL EXAM:     Mental status: Awake, alert and fully oriented, follows commands  Speech and language: speech is clear and fluent. The patient is able to name and repeat.  Cranial nerve exam: Pupils are equal, round and reactive to light bilaterally. Visual fields are full. Extraocular muscles are intact. Sensation in the face is intact to light touch. Face is symmetric. Hearing to finger rub equal. Palate elevates symmetrically. Shoulder shrug is full. Tongue is midline.  Motor exam: Strength is 5/5 in all extremities both distally and proximally. Tone is normal. No abnormal movements were seen on exam.  Sensory exam: No sensory deficits identified   Deep tendon reflexes:  2+ and symmetric.   Pronator drift negative.  Romberg POSITIVE  Toes down-going bilaterally.  Coordination: no ataxia   Gait: normal    Objective Data:    Labs:  Lab Results   Component Value Date/Time    PROTHROMBTM 14.9 (H) 10/24/2022 01:19 AM    INR 1.19 (H) 10/24/2022 01:19 AM      Lab Results   Component Value Date/Time    WBC 5.8 10/20/2023 08:01 AM    WBC 7.9 02/10/2010 04:04 PM    RBC 4.19 (L) 10/20/2023 08:01 AM    RBC 4.86 02/10/2010 04:04 PM    HEMOGLOBIN 12.8 10/20/2023 08:01 AM    HEMATOCRIT 39.2 10/20/2023 08:01 AM    MCV 93.6 10/20/2023 08:01 AM    MCV 86 02/10/2010 04:04 PM    MCH 30.5 10/20/2023 08:01 AM    MCH 27.8 02/10/2010 04:04 PM    MCHC 32.7 10/20/2023 08:01 AM    MPV 10.8 10/20/2023 08:01 AM    NEUTSPOLYS 77.00 (H) 10/12/2023 10:58 PM    LYMPHOCYTES 11.90 (L) 10/12/2023 10:58 PM    MONOCYTES 9.70 10/12/2023 10:58 PM     EOSINOPHILS 0.50 10/12/2023 10:58 PM    EOSINOPHILS 34 01/28/2021 02:40 PM    BASOPHILS 0.40 10/12/2023 10:58 PM    HYPOCHROMIA 1+ 03/15/2014 10:02 AM      Lab Results   Component Value Date/Time    SODIUM 140 10/20/2023 08:01 AM    POTASSIUM 3.6 10/20/2023 08:01 AM    CHLORIDE 102 10/20/2023 08:01 AM    CO2 25 10/20/2023 08:01 AM    GLUCOSE 104 (H) 10/20/2023 08:01 AM    BUN 11 10/20/2023 08:01 AM    CREATININE 1.06 10/20/2023 08:01 AM    CREATININE 0.89 02/10/2010 04:04 PM    BUNCREATRAT 17 02/10/2010 04:04 PM    GLOMRATE >59 02/10/2010 04:04 PM      Lab Results   Component Value Date/Time    CHOLSTRLTOT 194 05/23/2022 02:36 AM    LDL 90 05/23/2022 02:36 AM    HDL 81 05/23/2022 02:36 AM    TRIGLYCERIDE 113 05/23/2022 02:36 AM       Lab Results   Component Value Date/Time    ALKPHOSPHAT 63 10/20/2023 08:01 AM    ASTSGOT 30 10/20/2023 08:01 AM    ALTSGPT 19 10/20/2023 08:01 AM    TBILIRUBIN 0.4 10/20/2023 08:01 AM        Imaging/Testing:  MRI brain w/wo pending    Assessment and Plan:    Patient is a 69yo female with PMH of Crohn's disease s/p ileostomy, HTN, and chronic pain that is under legal hold after evaluation by psychiatry for concern of kely, and ruling out alternative causes of kely. Currently on depakote 250mg qAM/500mg qPM. NMDA receptor encephalitis unlikely but cannot be ruled out. Add RPR, copper. EEG pending.     Plan:  -Order EEG  -Pending MRI brain w/wo  -Hold off on LP for now  -Consider ordering serum autoimmune encephalitis and paraneoplastic antibody extended panels  -add RPR, copper  -B12, TSH normal  -Neurology will follow      I saw and examined Ms. Overton with Dr. Yan, and I agree with the assessment and plan.    Jesse Fitch MD  Neurohospitalist

## 2023-10-23 NOTE — DIETARY
Nutrition Update:    Day 0 of admit.  Jana Overton is a 68 y.o. female with admitting DX of Lactic acid acidosis [E87.20].  Patient being followed to optimize nutrition.    Current Diet: Cardiac, Magic Cup PRN   PO intake: % x2 meals, 50-75% x1 meal, 25-50% x2 meals    Problem: Nutritional:  Goal: Achieve adequate nutritional intake  Description: Patient will consume 50% of meals  Outcome: progressing     Plan / Recommendations:   Continue current diet order and meal plan with pt preferences  Continue Magic Cup PRN   Encourage intake of meals/ONS  Document intake of all meals/ONS  as % taken in ADL's to provide interdisciplinary communication across all shifts.   Achieve consistent PO intake of 50% of meals  Monitor weight, new wt requested from RN   Nutrition rep will continue to see patient for ongoing meal and snack preferences    RD following

## 2023-10-23 NOTE — CARE PLAN
The patient is Stable - Low risk of patient condition declining or worsening    Shift Goals  Clinical Goals: Patient will report a reduced pain from 9 to less than 4/10 at the end of the shift  Patient Goals: Rest  Family Goals: madina    Progress made toward(s) clinical / shift goals:  Received patient in bed alert and oriented x3. Reports pain 9/10. Administered scheduled and PRN medications. Hourly rounds performed and fall precautions in place.     Patient is not progressing towards the following goals:      Problem: Pain - Standard  Goal: Alleviation of pain or a reduction in pain to the patient’s comfort goal  Outcome: Not Progressing

## 2023-10-23 NOTE — DISCHARGE PLANNING
[facility] ALERT TEAM DISCHARGE PLANNING NOTE    Date:  10/23/23  Patient Name:  Jana Overton - 68 y.o. - Discharge Planning  MRN:  [id number]   YOB: 1955  ADMISSION DATE:  10/12/2023     Writer forwarded referral packet for inpatient psychiatric care to the following community providers:  [facility], [facility], [facility], [facility]    Items included in the referral packet:   __x___Face Sheet   __x___Pages 1 and 2 of completed legal hold   __x___Alert Team/Psych Assessment   __x___H&P   _____UDS   _____Blood Alcohol   __x___Vital signs   __n/a___Pregnancy Test (if applicable)   __x___Medications List   _____Covid Screen

## 2023-10-24 LAB
RHEUMATOID FACT SER IA-ACNC: 10 IU/ML (ref 0–14)
T PALLIDUM AB SER QL IA: NORMAL

## 2023-10-24 PROCEDURE — 86362 MOG-IGG1 ANTB CBA EACH: CPT

## 2023-10-24 PROCEDURE — 86052 AQUAPORIN-4 ANTB CBA EACH: CPT

## 2023-10-24 PROCEDURE — 83519 RIA NONANTIBODY: CPT

## 2023-10-24 PROCEDURE — 86256 FLUORESCENT ANTIBODY TITER: CPT

## 2023-10-24 PROCEDURE — 86431 RHEUMATOID FACTOR QUANT: CPT

## 2023-10-24 PROCEDURE — 36415 COLL VENOUS BLD VENIPUNCTURE: CPT

## 2023-10-24 PROCEDURE — 86341 ISLET CELL ANTIBODY: CPT

## 2023-10-24 PROCEDURE — 770001 HCHG ROOM/CARE - MED/SURG/GYN PRIV*

## 2023-10-24 PROCEDURE — 86255 FLUORESCENT ANTIBODY SCREEN: CPT | Mod: 91

## 2023-10-24 PROCEDURE — 95819 EEG AWAKE AND ASLEEP: CPT | Mod: 26 | Performed by: PSYCHIATRY & NEUROLOGY

## 2023-10-24 PROCEDURE — 99233 SBSQ HOSP IP/OBS HIGH 50: CPT | Performed by: INTERNAL MEDICINE

## 2023-10-24 PROCEDURE — 86225 DNA ANTIBODY NATIVE: CPT

## 2023-10-24 PROCEDURE — 700111 HCHG RX REV CODE 636 W/ 250 OVERRIDE (IP): Performed by: STUDENT IN AN ORGANIZED HEALTH CARE EDUCATION/TRAINING PROGRAM

## 2023-10-24 PROCEDURE — 95819 EEG AWAKE AND ASLEEP: CPT | Performed by: PSYCHIATRY & NEUROLOGY

## 2023-10-24 PROCEDURE — 700105 HCHG RX REV CODE 258: Performed by: HOSPITALIST

## 2023-10-24 PROCEDURE — 4A10X4Z MONITORING OF CENTRAL NERVOUS ELECTRICAL ACTIVITY, EXTERNAL APPROACH: ICD-10-PCS | Performed by: PSYCHIATRY & NEUROLOGY

## 2023-10-24 PROCEDURE — 82525 ASSAY OF COPPER: CPT

## 2023-10-24 PROCEDURE — 86780 TREPONEMA PALLIDUM: CPT

## 2023-10-24 PROCEDURE — 99231 SBSQ HOSP IP/OBS SF/LOW 25: CPT | Performed by: INTERNAL MEDICINE

## 2023-10-24 PROCEDURE — 86038 ANTINUCLEAR ANTIBODIES: CPT

## 2023-10-24 PROCEDURE — 700111 HCHG RX REV CODE 636 W/ 250 OVERRIDE (IP): Mod: JZ | Performed by: HOSPITALIST

## 2023-10-24 PROCEDURE — 86039 ANTINUCLEAR ANTIBODIES (ANA): CPT

## 2023-10-24 PROCEDURE — 86235 NUCLEAR ANTIGEN ANTIBODY: CPT | Mod: 91

## 2023-10-24 RX ORDER — OXYCODONE HCL 5 MG/5 ML
5 SOLUTION, ORAL ORAL EVERY 4 HOURS PRN
Status: DISCONTINUED | OUTPATIENT
Start: 2023-10-24 | End: 2023-10-28 | Stop reason: HOSPADM

## 2023-10-24 RX ORDER — VALPROIC ACID 250 MG/5ML
250 SOLUTION ORAL EVERY MORNING
Status: DISCONTINUED | OUTPATIENT
Start: 2023-10-25 | End: 2023-10-24

## 2023-10-24 RX ORDER — VALPROIC ACID 250 MG/5ML
500 SOLUTION ORAL EVERY EVENING
Status: DISCONTINUED | OUTPATIENT
Start: 2023-10-24 | End: 2023-10-24

## 2023-10-24 RX ORDER — OXYCODONE HCL 5 MG/5 ML
10 SOLUTION, ORAL ORAL EVERY 4 HOURS PRN
Status: DISCONTINUED | OUTPATIENT
Start: 2023-10-24 | End: 2023-10-28 | Stop reason: HOSPADM

## 2023-10-24 RX ADMIN — HYDROMORPHONE HYDROCHLORIDE 0.5 MG: 1 INJECTION, SOLUTION INTRAMUSCULAR; INTRAVENOUS; SUBCUTANEOUS at 02:37

## 2023-10-24 RX ADMIN — HEPARIN SODIUM 5000 UNITS: 5000 INJECTION, SOLUTION INTRAVENOUS; SUBCUTANEOUS at 05:58

## 2023-10-24 RX ADMIN — DIAZEPAM 5 MG: 5 INJECTION, SOLUTION INTRAMUSCULAR; INTRAVENOUS at 01:00

## 2023-10-24 RX ADMIN — VALPROATE SODIUM 250 MG: 100 INJECTION, SOLUTION INTRAVENOUS at 06:00

## 2023-10-24 RX ADMIN — HEPARIN SODIUM 5000 UNITS: 5000 INJECTION, SOLUTION INTRAVENOUS; SUBCUTANEOUS at 22:21

## 2023-10-24 RX ADMIN — HEPARIN SODIUM 5000 UNITS: 5000 INJECTION, SOLUTION INTRAVENOUS; SUBCUTANEOUS at 15:45

## 2023-10-24 ASSESSMENT — ENCOUNTER SYMPTOMS
CHILLS: 0
VOMITING: 0
DIZZINESS: 0
PND: 0
BLURRED VISION: 0
HEADACHES: 0
DOUBLE VISION: 0
BRUISES/BLEEDS EASILY: 0
HEARTBURN: 0
HEMOPTYSIS: 0
BACK PAIN: 0
FEVER: 0
NAUSEA: 0
PALPITATIONS: 0
MYALGIAS: 0
COUGH: 0
DEPRESSION: 0
WHEEZING: 0
CLAUDICATION: 0
ABDOMINAL PAIN: 1

## 2023-10-24 ASSESSMENT — PAIN DESCRIPTION - PAIN TYPE
TYPE: ACUTE PAIN

## 2023-10-24 NOTE — CARE PLAN
The patient is Stable - Low risk of patient condition declining or worsening    Shift Goals  Clinical Goals: Patient will remain free from falls or injury this shift  Patient Goals: Rest  Family Goals: madina    Progress made toward(s) clinical / shift goals:  Goal met. Pt AXOX4 this shift with some intermittent thoughts of seeing children in hallway in gown, psych notified. Ambulates with steady gait. PRN Dilaudid given X 2 this shift for C/O generalized pain. Calm and cooperative with cares this shift but continues to refuse PO meds. 1:1 sitter at bedside, call light in reach.    Patient is not progressing towards the following goals:

## 2023-10-24 NOTE — CARE PLAN
The patient is Stable - Low risk of patient condition declining or worsening    Shift Goals  Clinical Goals: patient will report a reduced pain from 9 to less than 4/10 at the end of the shift.   Patient Goals: ensure safety  Family Goals: madina    Progress made toward(s) clinical / shift goals:  received patient in bed alert and oriented x3. Administered scheduled and PRN medications. Sage in place and ostomy intact. Drains emptied. Hourly rounds performed and fall precautions in place. Call light within reach.     Patient is not progressing towards the following goals:    Problem: Pain - Standard  Goal: Alleviation of pain or a reduction in pain to the patient’s comfort goal  Outcome: Not Progressing

## 2023-10-24 NOTE — DISCHARGE PLANNING
Case Management Discharge Planning    Admission Date: 10/12/2023  GMLOS: 2.6  ALOS: 0    6-Clicks ADL Score: 20  6-Clicks Mobility Score: 24      Anticipated Discharge Dispo: Discharge Disposition: D/T to home under [facility] care in anticipation of covered skilled care (06)    DME Needed: No    Action(s) Taken: OTHER still waiting for psych placement and hold    Escalations Completed: None    Medically Clear: Yes    Next Steps: still waiting for placement    Barriers to Discharge: Medical clearance    Is the patient up for discharge tomorrow: No    Pt has a ileostomy which most likely is a barrier for discharge to psych facility at this time.

## 2023-10-24 NOTE — PROGRESS NOTES
GI team Dr. Davalos and Lula saw the patient at the bedside.  The patient's main concern is the legal hold and also some discomfort from the abdomen.  The patient agrees that currently inpatient upper GI scope would not provide additional benefit.  If the patient can maintain nutrition and hydration on full liquid diet such as protein shake, the patient should be able to be discharged and avoid NG feeding tube.    The patient will need help from the primary doctor to go through outpatient GI and see a specialist either in [place] or [place] for possible myotomy for her type II achalasia.    No other intervention from the inpatient GI team.

## 2023-10-24 NOTE — DISCHARGE PLANNING
Legal Hold    Referral: Legal Hold Court     Intervention: Pt presented for legal hold meeting with  via video conferencing.     Pt very upset that she was placed on a legal hold, wanting to know the names of the people who placed her on a legal hold because she does not have mental health issues.   Pt then refused to continue speaking to  until she has her  and  present.   advised pt will meet with court MD's via telemedicine monitor to contest the legal hold due to pt not agreeing to be on a legal hold.     Plan: Pt will present to telemedicine mental health to meet with court physicians 10/25. Will call bedside RN once time has been determined.

## 2023-10-24 NOTE — CONSULTS
"PSYCHIATRIC FOLLOW-UP:(established)   *Reason for admission: Nausea, Vomiting, Abdominal Pain, Poor PO Intake   *Legal Hold Status: On hold, extended  Chart reviewed.         *HPI:   Patient spoke with the medical student earlier this morning and endorsed the following:  Patient is no longer showcasing flight of ideas, continues to be hyper-verbal but no longer pressured, continues to be irritated (less so than previous visits and likely attributed to continued hold), and is no longer exhibiting circumferential speech.  Patient continues to have visual disturbance (blurry vision) but upon further questioning states she gets \"flare ups\" which cause blurry vision to happen, along with joint inflammation, and edema on a seasonal basis. Patient continues to exhibit poor insight on her condition and why she was placed on a legal hold, which had to be re-explained to her twice during today's visit.       She reports she has been able to eat more, and reports having finished her breakfast and expresses she will be able to eat and take care of herself at home. Patient states she has nausea and a migraine, which has persisted across her stay. Patient continues to feel cold, and has been given extra blankets, of which she appreciated. Patient continues to be agitated at being unable to see her normal GI doctor, and when relayed possible treatment options from the GI team denied wanting a temporary feeding tube as well as did not want to discuss tx options with the onsite team unless it was with her primary GI specialist. She denies SI/HI. Denies hallucinations.     Attending attempted to re-interview the patient but she was distressed regarding her legal hold and refused to speak with this provider    Medical ROS (as pertinent):     + (Migraine, nausea, blurry vision)       *Psychiatric Examination:     Vitals:    10/24/23 0823   BP: 125/58   Pulse: 99   Resp: 17   Temp: 36.3 °C (97.4 °F)   SpO2: 100%     General Appearance: " "NAD, thin, appears stated age, good hygiene, hospital gown   Behavior: Cooperative, engaged, friendly, good eye contact in the morning, tearful irritable and uncooperative this afternoon  Abnormal Movements:   · Psychomotor: No tics, tremors, No dystonia   Gait and Posture: not observed today  Speech: Fluent, coherent, hyperverbal but not pressured  Thought processes: Linear, coherent, goal-directed, rambling, needs to be redirected   Associations:No loose associations   Abnormal or Psychotic Thoughts: No hallucinations or delusions  Judgement and Insight: Poor insight into mood disorder, judgment limited by insight   Orientation: AxO x4 this AM, patient would not participate this afternoon  Recent and Remote Memory: appears intact   Attention Span and Concentration: appears intact   Language: English, fluent   Fund of Knowledge: Appropriate for age   Mood and Affect:   · Mood: \"annoyed\"   · Affect: Irritated, congruent to stated mood, normal range   SI/HI: Denies SI/HI     Current medications:     Current Facility-Administered Medications:     oxyCODONE (Roxicodone) oral solution 5 mg, 5 mg, Oral, Q4HRS PRN **OR** oxyCODONE (Roxicodone) oral solution 10 mg, 10 mg, Oral, Q4HRS PRN, Adilson Newby M.D.    SUMAtriptan (Imitrex) tablet 100 mg, 100 mg, Oral, Q2HRS PRN, MARCO Fitch    valproate (Depacon) 250 mg in dextrose 5% 50 mL IVPB, 250 mg, Intravenous, QAM, Rod Valencia M.D., Stopped at 10/24/23 0700    diazePAM (Valium) injection 5 mg, 5 mg, Intravenous, Q8HRS PRN, Rod Valencia M.D., 5 mg at 10/24/23 0100    [DISCONTINUED] oxyCODONE immediate-release (Roxicodone) tablet 5 mg, 5 mg, Oral, Q3HRS PRN **OR** [DISCONTINUED] oxyCODONE immediate release (Roxicodone) tablet 10 mg, 10 mg, Oral, Q3HRS PRN **OR** HYDROmorphone (Dilaudid) injection 0.5 mg, 0.5 mg, Intravenous, Q8HRS PRN, Rod Valencia M.D., 0.5 mg at 10/24/23 0237    valproate (Depacon) 500 mg in dextrose 5% 50 mL " IVPB, 500 mg, Intravenous, Q EVENING, Kim Akers D.O., Stopped at 10/23/23 1911    heparin injection 5,000 Units, 5,000 Units, Subcutaneous, Q8HRS, Leonel Rodriguez M.D., 5,000 Units at 10/24/23 0558    Pharmacy Consult Request ...Pain Management Review 1 Each, 1 Each, Other, PHARMACY TO DOSE, Leonel Rodriguez M.D.    [] acetaminophen (Tylenol) tablet 650 mg, 650 mg, Oral, Q6HRS, 650 mg at 10/13/23 0613 **FOLLOWED BY** acetaminophen (Tylenol) tablet 650 mg, 650 mg, Oral, Q6HRS PRN, Leonel Rodriguez M.D.    ondansetron (Zofran) syringe/vial injection 4 mg, 4 mg, Intravenous, Q4HRS PRN, Leonel Rodriguez M.D., 4 mg at 10/22/23 1843    hydroxychloroquine (Plaquenil) tablet 300 mg, 300 mg, Oral, DAILY, Leonel Rodriguez M.D.    levalbuterol (Xopenex) 0.63 MG/3ML nebulizer solution 0.63 mg, 3 mL, Inhalation, Q4HRS PRN, Leonel Rodriguez M.D.    metoprolol tartrate (Lopressor) tablet 50 mg, 50 mg, Oral, BID, Leonel Rodriguez M.D.    Labs (pesonally reviewed)  None today    MRI Brain w/wo contrast (10/23/23)  FINDINGS:     Diffusion-weighted images are normal. Gradient echo images are normal. There is no evidence of intercurrent hemorrhage.  No focal brain lesions are seen on the T2/FLAIR images. Ventricles are normal.  Mildly prominent dural enhancement is noted throughout the brain. The Sella is within normal limits.  The craniocervical junction is within normal limits.  No focal brain lesions are identified.  There is no evidence of intracranial mass or mass effect. No evidence of midline shift.  There are no extra axial collections.  Visualized intracranial arterial flow voids are within normal limits.  Bone marrow signal in the calvarium is within normal limits.  Included portions of the paranasal sinuses are within normal limits.  Included portions of the mastoid air cells are within normal limits.  Included portions of the orbits are within normal limits     IMPRESSION:        No acute  intracranial process.     Mildly prominent dural enhancement noted throughout the brain. Has the patient had a recent recent lumbar puncture?.    Assessment:   This is a 68-year-old female with past history per chart review of symptoms consistent with kely, unspecified anxiety and OCPD hospitalized for lactic acidosis.  During this hospitalization the patient met criteria for acute kely as she was grandiose, hyperverbal, had pressured speech, poor sleep, irritability, distractibility and had mood lability present. She has frequently distrusted other physicians regarding her GI issues and states she will only speak with her GI physician Dr. Davalos She was started on Depakote to address aforementioned symptoms for mood disorder. Patient also showcased cognitive impairment through a 16/30 on her MOCA administered 10/23.     Patient is still noted to be irritated, though this can be secondary to her prolonged hold and want to be discharged for self care at home. Patient has been eating more appropriately, and expresses that she will be able to eat and take care of herself at home. Still refuses to take an PO medications. Upon consulting neurology for assistance in ruling out an autoimmune encephalopathy as cause for kely, they report it is unlikely to be the etiology but cannot be ruled out. They have reviewed head imaging and ordered an EEG and LP. Encouraged patient today to participate in these tests.     1. Manic disorder (unspecified, rule out medical etiology)       Medical : See medical note       Plan:   · Legal hold: on hold, extended, saw  today will see court MD tomorrow  · Psychotropic medications: Continue depakote 250 mg QD and 500 mg QHS, level will be collected on Thursday 10/26/23 and can be adjusted further at that time. If patient will take PO liquids this can be switched to a liquid solution. Attempted to discuss this with the patient but she was upset and refused to discuss this with  me   · Please transfer pt to inpatient psychiatric hospital when medically cleared and bed is available (able to take PO medication)    Sitter: 1:1   Phone: yes   Visitors: family only   Personal belongings: yes

## 2023-10-24 NOTE — PROGRESS NOTES
Neurology Progress Note  Neurohospitalist Service, [facility]    Referring Physician: Adilson Newby M.D.    Chief Complaint   Patient presents with    Urinary Retention     Since 3 days associated with generalized body pain and abdominal pain  She has milton catheter in place this morning ag[facility] urologist office, but no urine output since then    Alert and Oriented x 4  Using cane for ambulation       HPI: Refer to initial documented Neurology H&P, as detailed in the patient's chart.    Interval History   10/23: Consult completed.  10/24: MRI brain yesterday showing dural enhancement. EEG and further work-up ordered. Recommend LP if patient agreeable.    Review of systems: In addition to what is detailed in the HPI and/or updated in the interval history, all other systems reviewed and are negative.    Past Medical History:    has a past medical history of Anesthesia, Anxiety, Arthritis, ASTHMA, Atrial fib/flut, Backpain, Bronchitis, Chronic pain, Colostomy in place ([facility]), COPD (chronic obstructive pulmonary disease) ([facility]), Crohn's disease of colon ([facility]), Dyspnea, History of cardiac murmur, Ileostomy present ([facility]), Infectious disease, Multiple falls, Narcotic dependence ([facility]), Obstruction, Pain, Pneumonia, Postherpetic neuralgia, Rosacea, and Sleep apnea.    She has no past medical history of CAD (coronary artery disease), Liver disease, or Seizure disorder ([facility]).    FHx:  family history includes Cancer (age of onset: 40) in her maternal aunt; Diabetes in her father and sister; Heart Disease in her father; Lung Disease in her mother.    SHx:   reports that she has never smoked. She has never used smokeless tobacco. She reports that she does not currently use alcohol after a past usage of about 0.6 oz of alcohol per week. She reports current drug use. Drugs: Marijuana and Inhaled.    Medications:    Current Facility-Administered Medications:     SUMAtriptan (Imitrex) tablet 100 mg, 100 mg, Oral,  Q2HRS PRN, MARCO Fitch    valproate (Depacon) 250 mg in dextrose 5% 50 mL IVPB, 250 mg, Intravenous, QAM, Rod Valencia M.D., Stopped at 10/24/23 0700    diazePAM (Valium) injection 5 mg, 5 mg, Intravenous, Q8HRS PRN, Rod Valencia M.D., 5 mg at 10/24/23 0100    oxyCODONE immediate-release (Roxicodone) tablet 5 mg, 5 mg, Oral, Q3HRS PRN **OR** oxyCODONE immediate release (Roxicodone) tablet 10 mg, 10 mg, Oral, Q3HRS PRN **OR** HYDROmorphone (Dilaudid) injection 0.5 mg, 0.5 mg, Intravenous, Q8HRS PRN, Rod Valencia M.D., 0.5 mg at 10/24/23 0237    valproate (Depacon) 500 mg in dextrose 5% 50 mL IVPB, 500 mg, Intravenous, Q EVENING, Kim Akers D.O., Stopped at 10/23/23 1911    heparin injection 5,000 Units, 5,000 Units, Subcutaneous, Q8HRS, Leonel Rodriguez M.D., 5,000 Units at 10/24/23 0558    Pharmacy Consult Request ...Pain Management Review 1 Each, 1 Each, Other, PHARMACY TO DOSE, Leonel Rodriguez M.D.    [] acetaminophen (Tylenol) tablet 650 mg, 650 mg, Oral, Q6HRS, 650 mg at 10/13/23 0613 **FOLLOWED BY** acetaminophen (Tylenol) tablet 650 mg, 650 mg, Oral, Q6HRS PRN, Leonel Rdoriguez M.D.    ondansetron (Zofran) syringe/vial injection 4 mg, 4 mg, Intravenous, Q4HRS PRN, Leonel Rodriguez M.D., 4 mg at 10/22/23 1843    hydroxychloroquine (Plaquenil) tablet 300 mg, 300 mg, Oral, DAILY, Leonel Rodriguez M.D.    levalbuterol (Xopenex) 0.63 MG/3ML nebulizer solution 0.63 mg, 3 mL, Inhalation, Q4HRS PRN, Leonel Rodriguez M.D.    metoprolol tartrate (Lopressor) tablet 50 mg, 50 mg, Oral, BID, Leonel Rodriguez M.D.    Physical Examination:     Vitals:    10/24/23 0237 10/24/23 0340 10/24/23 0430 10/24/23 0823   BP:  (!) 141/79  125/58   Pulse:  88  99   Resp: 17 16 17 17   Temp:  36.4 °C (97.5 °F)  36.3 °C (97.4 °F)   TempSrc:  Temporal  Temporal   SpO2:  96%  100%   Weight:       Height:           General: Patient is awake and in no acute  distress  Eyes: examination of optic disks not indicated at this time  CV: RRR    NEUROLOGICAL EXAM:     Mental status: Awake, alert and fully oriented, follows commands  Speech and language: speech is clear and fluent. The patient is able to name and repeat.  Cranial nerve exam: Pupils are equal, round and reactive to light bilaterally. Visual fields are full. Extraocular muscles are intact. Sensation in the face is intact to light touch. Face is symmetric. Hearing to finger rub equal. Palate elevates symmetrically. Shoulder shrug is full. Tongue is midline.  Motor exam: Strength is 5/5 in all extremities both distally and proximally. Tone is normal. No abnormal movements were seen on exam.  Sensory exam: No sensory deficits identified   Deep tendon reflexes:  2+ and symmetric.   Pronator drift negative.  Romberg POSITIVE  Toes down-going bilaterally.  Coordination: no ataxia   Gait: normal    Objective Data:    Labs:  Lab Results   Component Value Date/Time    PROTHROMBTM 14.9 (H) 10/24/2022 01:19 AM    INR 1.19 (H) 10/24/2022 01:19 AM      Lab Results   Component Value Date/Time    WBC 5.8 10/20/2023 08:01 AM    WBC 7.9 02/10/2010 04:04 PM    RBC 4.19 (L) 10/20/2023 08:01 AM    RBC 4.86 02/10/2010 04:04 PM    HEMOGLOBIN 12.8 10/20/2023 08:01 AM    HEMATOCRIT 39.2 10/20/2023 08:01 AM    MCV 93.6 10/20/2023 08:01 AM    MCV 86 02/10/2010 04:04 PM    MCH 30.5 10/20/2023 08:01 AM    MCH 27.8 02/10/2010 04:04 PM    MCHC 32.7 10/20/2023 08:01 AM    MPV 10.8 10/20/2023 08:01 AM    NEUTSPOLYS 77.00 (H) 10/12/2023 10:58 PM    LYMPHOCYTES 11.90 (L) 10/12/2023 10:58 PM    MONOCYTES 9.70 10/12/2023 10:58 PM    EOSINOPHILS 0.50 10/12/2023 10:58 PM    EOSINOPHILS 34 01/28/2021 02:40 PM    BASOPHILS 0.40 10/12/2023 10:58 PM    HYPOCHROMIA 1+ 03/15/2014 10:02 AM      Lab Results   Component Value Date/Time    SODIUM 140 10/20/2023 08:01 AM    POTASSIUM 3.6 10/20/2023 08:01 AM    CHLORIDE 102 10/20/2023 08:01 AM    CO2 25  10/20/2023 08:01 AM    GLUCOSE 104 (H) 10/20/2023 08:01 AM    BUN 11 10/20/2023 08:01 AM    CREATININE 1.06 10/20/2023 08:01 AM    CREATININE 0.89 02/10/2010 04:04 PM    BUNCREATRAT 17 02/10/2010 04:04 PM    GLOMRATE >59 02/10/2010 04:04 PM      Lab Results   Component Value Date/Time    CHOLSTRLTOT 194 05/23/2022 02:36 AM    LDL 90 05/23/2022 02:36 AM    HDL 81 05/23/2022 02:36 AM    TRIGLYCERIDE 113 05/23/2022 02:36 AM       Lab Results   Component Value Date/Time    ALKPHOSPHAT 63 10/20/2023 08:01 AM    ASTSGOT 30 10/20/2023 08:01 AM    ALTSGPT 19 10/20/2023 08:01 AM    TBILIRUBIN 0.4 10/20/2023 08:01 AM        Imaging/Testing:  MRI brain w/wo 10/23/23 without acute abnormality, showing mildly prominent dural enhancement throughout the brain    Assessment and Plan:    Patient is a 69yo female with PMH of Crohn's disease s/p ileostomy, HTN, and chronic pain that is under legal hold after evaluation by psychiatry for concern of kely, and ruling out alternative causes of kely. Currently on depakote 250mg qAM/500mg qPM. NMDA receptor encephalitis unlikely but cannot be ruled out. Add serum autoimmune encephalitis panel, paraneoplastic antibody panel, VAHID, anti-NGUYỄN, RF, copper, and RPR. EEG pending. Recommend LP if patient agreeable.    Plan:  -Ordered EEG  -Ordered serum autoimmune encephalitis and paraneoplastic antibody extended panels  -Ordered VAHID, anti-NGUYỄN, RF, copper, and RPR  -given MRI findings, recommend LP if patient agrees, with CSF studies including cell count, protein, glucose, meningitis/encephalitis, AFB/TB, cryptococcal antigen, encephalopathy autoimmune eval, paraneoplastic autoantibody eval, treponema pallidum Ab  -B12, TSH normal  -Neurology will follow    John Yan MD  [employer] IM PGY-3    Plan discussed with my attending, Dr. Fitch.    I saw and examined Ms. Overton with Dr. Yan, and I agree with the assessment and plan.    Jesse Fitch MD  Neurohospitalist

## 2023-10-24 NOTE — CONSULTS
"PSYCHIATRIC FOLLOW-UP:(established)   *Reason for admission:      Nausea, Vomiting, abdominal pain, poor PO intake  *Legal Hold Status:         On Hold     Chart reviewed.         Interval History: Patient is showcasing flight of ideas, is hyperverbal, irritable, and circumferential speech with visual disturbance (blurry vision). Patient is annoyed at being unable to receive diazepam and demands to know why she is continued on a legal hold, which was explained to be due to inability to care for herself outside of hospital setting due to no oral intake of medications or food and water intake earlier in her stay. Of note she reports she has been able to eat more but still expresses fears that pills will get stuck in her esophagus and further destroy her esophagus. Patient denies N/V but notes continued stomach pain and headaches. Feels it is because her medications are given late. She reports she did no sleep as well last night because she was feeling cold. Patient's main concerns regarded GI disturbances and was told GI team would come to visit them, though patient became agitated that the provider may not be her regular doctor, and was intermittently declining to speak to them as a result. She denies SI/HI. Denies hallucinations. States today she still has \"supersonic hearing\" and so can hear it if staff speaks negatively about her. No other delusions.    Psychiatric ROS  See Above      Medical ROS (as pertinent):     + HA, Stomach Pain, changes in vision (blurry vision)     *Psychiatric Examination:      Vitals:    10/23/23 1807   BP:    Pulse:    Resp: 16   Temp:    SpO2:    BP-104/56, P-100    General Appearance: NAD, thin, appears stated age, good hygiene, hospital gown   Behavior:  Cooperative, engaged, irritable, good eye contact, easily distractible   Abnormal Movements:   · Psychomotor: No tics, tremors of dykinesias. No dystonia  Gait and Posture: steady, no shuffling, narrow based gait  Speech: Fluent, " "coherent, pressured speech but she can be redirected. Still noted to be hyperverbal. Normal volume  Thought processes: Circumstantial but can be redirected    Associations: no loose associations   Abnormal or Psychotic Thoughts: delusions regarding having \"supersonic hearing\" , no hallucinations  Judgement and Insight: poor insight into mood disorder, judgement limited by insight  Orientation: AxO x4   Recent and Remote Memory: appears intact  Attention Span and Concentration: Intact - Decreased attention span   Language: English, fluent   Fund of Knowledge: Appropriate for age   Mood and Affect:   · Mood: \"annoyed\"  · Affect: irritable  SI/HI: Denies SI/HI     [place] Cognitive Assessment (MOCA)     VISUOSPACIAL / EXECUTIVE   Clock Drawin  Trails:  0  Cube Drawin    NAMING  Namin    ATTENTION  Digits:  1  Letters:  1  Subtraction:  0    LANGUAGE  Repeat Phrases:  2  Fluency:  0    ABSTRACTION  Abstraction:  2    DELAYED RECALL  Recall words: 0   Category Cue (if applicable):    Multiple Choice Cue (if applicable):     ORIENTATION  Orientation:  6    Add 1 point if less than or equal to 12 yr education level:     MOCA TOTAL SCORE:   16  Biomechanical/Visual Limitations (if applicable):           *Labs personally reviewed:   Recent Results (from the past 24 hour(s))   PHOSPHORUS   Collection Time: 10/23/23  4:32 AM   Result Value Ref Range   Phosphorus 3.1 2.5 - 4.5 mg/dL       Current medications:     Current Facility-Administered Medications:     SUMAtriptan (Imitrex) tablet 100 mg, 100 mg, Oral, Q2HRS PRN, JACOB FitchRSUNIL    valproate (Depacon) 250 mg in dextrose 5% 50 mL IVPB, 250 mg, Intravenous, QAM, Rod Valencia M.D., Stopped at 10/23/23 0643    diazePAM (Valium) injection 5 mg, 5 mg, Intravenous, Q8HRS PRN, Rod Valencia M.D., 5 mg at 10/23/23 1601    oxyCODONE immediate-release (Roxicodone) tablet 5 mg, 5 mg, Oral, Q3HRS PRN **OR** oxyCODONE immediate release " (Roxicodone) tablet 10 mg, 10 mg, Oral, Q3HRS PRN **OR** HYDROmorphone (Dilaudid) injection 0.5 mg, 0.5 mg, Intravenous, Q8HRS PRN, Rdo Valencia M.D., 0.5 mg at 10/23/23 1807    valproate (Depacon) 500 mg in dextrose 5% 50 mL IVPB, 500 mg, Intravenous, Q EVENING, Kim Akers D.O., Stopped at 10/23/23 1911    heparin injection 5,000 Units, 5,000 Units, Subcutaneous, Q8HRS, Leonel Rodriguez M.D., 5,000 Units at 10/23/23 1554    Pharmacy Consult Request ...Pain Management Review 1 Each, 1 Each, Other, PHARMACY TO DOSE, Leonel Rodriguez M.D.    [] acetaminophen (Tylenol) tablet 650 mg, 650 mg, Oral, Q6HRS, 650 mg at 10/13/23 0613 **FOLLOWED BY** acetaminophen (Tylenol) tablet 650 mg, 650 mg, Oral, Q6HRS PRN, Leonel Rodriguez M.D.    ondansetron (Zofran) syringe/vial injection 4 mg, 4 mg, Intravenous, Q4HRS PRN, Leonel Rodriguez M.D., 4 mg at 10/22/23 1843    hydroxychloroquine (Plaquenil) tablet 300 mg, 300 mg, Oral, DAILY, Leonel Rodriguez M.D.    levalbuterol (Xopenex) 0.63 MG/3ML nebulizer solution 0.63 mg, 3 mL, Inhalation, Q4HRS PRN, Leonel Rodriguez M.D.    metoprolol tartrate (Lopressor) tablet 50 mg, 50 mg, Oral, BID, Leonel Rodriguez M.D.      Assessment:   This is a 68-year-old female with past history symptoms consistent with kely, unspecified anxiety and OCPD (all by history from chart review) hospitalized for lactic acidosis.  During this hospitalization she has met criteria for acute kely as patient is grandiose, talkative, has pressured speech, poor sleep, irritability, distractibility and has mood lability present.  She was started on Depakote to address nausea, headache and vomiting; and mood; initially some improvement but still significantly disorganized, pressured speech, grandiose, labile at times.      Patient speech is somewhat less pressured and thoughts more organized.  Grandiose delusions are less prominent. She is still noted to be irritable but this  could also be due to frustration as she feels she should be receiving an esophageal dilation and wants to speak to her GI specialist about this. She has been eating better but is still anxious about taking pills due to achalasia. At this time she continues to meet criteria for legal hold as she is still unable to care for her own needs in setting of acute mental health disorder that continues at this time.  MRI pending. Due to poor performance on MOCA today I have also recommended consulting neurology for assistance in ruling out an autoimmune encephalopathy as cause for kely.     Delirium   -->performance on MOCA was significantly below what would be expected for patient's age, education level and reported functional ability. Autoimmune encephalopathy could be a cause  Manic disorder (unspecified, rule out medical etiology)     Medical :  See medical note        Plan:  1- Legal hold: On hold  2- Psychotropic medications              *Continue depakote to 250mg qAM and 500 mg QHS IV.  3- Labs ordered/reviewed: Reviewed, plan to repeat Depakote level next week,  Thursday 10/26/23. MRI   4- brain MRI pending to rule out alternative causes of kely.   5. Please transfer to a psychiatric facility when patient medically cleared (able void without cath, able to take at least PO medications)  6-discussed the case with:  Dr. Law, Dr. Zepeda, also messaged Dr. Davalos in GI who will come to speak with patient. Also spoke with Dr. Fitch in neurology regarding possible autoimmune encephalopathy     Sitter: 1:1   Personal belongings: yes  Cell phone: yes  Visitors: family only

## 2023-10-24 NOTE — PROCEDURES
VIDEO ELECTROENCEPHALOGRAM REPORT      Referring provider: Dr. Newby    DOS: 10/24/23 (total recording of 27 minutes).     INDICATION:  Jana Overton 68 y.o. female presenting with kely     CURRENT ANTIEPILEPTIC REGIMEN: VPA    TECHNIQUE: 30 channel video electroencephalogram (EEG) was performed in accordance with the international 10-20 system. The study was reviewed in bipolar and referential montages. The recording examined the patient during   awake, drowsy and sleep states    DESCRIPTION OF THE RECORD:  During the wakefulness, the background showed a symmetrical 9-10 Hz alpha activity posteriorly with amplitude of 70 mV.  There was reactivity to eye closure/opening.  A normal anterior-posterior gradient was noted with faster beta frequencies seen anteriorly.  During drowsiness, increased theta/beta frequencies were seen.    During the sleep state,symmetrical sleep spindles and vertex sharps were seen in the leads over the central regions. No slow wave stage seen.     ACTIVATION PROCEDURES:     hyperventilation was not performed    Intermittent Photic stimulation was performed in a stepwise fashion from 1 to 30 Hz and elicited a normal response (photic driving), most noticeable in the posterior leads.    ICTAL AND/OR INTERICTAL FINDINGS:   No focal or generalized epileptiform activity noted. No regional slowing was seen during this routine study.  No clinical events or seizures were reported or recorded during the study.     EKG: sampling of the EKG recording demonstrated sinus rhythm.     EVENTS: none     INTERPRETATION:    This is a normal video EEG recording in the awake, drowsy and sleep states.   Note: A normal EEG does not rule out epilepsy.  If the clinical suspicion remains high for seizures, a prolonged recording to capture clinical or subclinical events may be helpful.    Eduardo Westfall MD  Diplomate in Neurology&Epilepsy  Office: [phone]  Fax: [phone]

## 2023-10-24 NOTE — DISCHARGE PLANNING
Alert Team Note:    Contacted by [facility], spoke to Devora. Pt has been declined due to facility being unable to accommodate a pt with an ileostomy.

## 2023-10-24 NOTE — PROGRESS NOTES
"Hospital Medicine Daily Progress Note    Date of Service  10/24/2023    Chief Complaint  Jana Overton is a 68 y.o. female admitted 10/12/2023 with abdominal pain, nausea    Hospital Course  68 y.o. female who presented 10/12/2023  with urinary retention, nausea.  Patient has a history of Crohn's disease status post colectomy with ileostomy on left side, hypertension, chronic pain.  She has had 2 recent emergency room visits due to worsening of chronic abdominal pain and difficulty tolerating p.o. intake with failure of p.o. Zofran at home.  On her last ED visit she had a borderline FLORES with creatinine increased to 1.5 from baseline of 1.2.  She was discharged home after some IV fluids.  She presents again due to concern for decreased urinary output.  She had a Sage placed at urology office for unclear reason however did not drain much urine.  Kidney function stable from last week however did have elevated lactic acid to 3.9.  After some IV fluids she is making some dark urine.  Due to concern for elevated lactic acid and inability to tolerate p.o. intake admission was requested.  CT abdomen unremarkable  Lactic acid 3.9 on admission, which has been resolved  ESR, CRP negative    Interval Problem Update  Seen patient at bedside  Patient is very hyperverbal and tangential.   Unable to answer questions appropriately  She refused to take po meds. State po meds will make her nausea, vomiting  However she is able to take po diet, however per patient she can only take one bite every 10 mins  She reports significant abdominal pain and leg cram. She has been talking in the room continuously. No sign of severe pain. Asking dialudid and valium iv. She states \"I used to inject iv dilaudid 4mg every 2h to my chest.\" \"I took valium 5mg for muscle spasm. If not working in 20 mins, I took another 5mg.\"    Paranoid and delirium  I have spoke to psychiatry, concerning delirium due to   I have spoke to psychiatry  Concerning " delirium due to Valium, I have reduced valium to 5mg bid prn  Ordered VitB1, B12, TSH  Discussed with GI      10/17 in bed, patient is new to me today, discussed with GI and psych team, continue supportive treatment will start depakote per psych recommendation, iv fluids, close monitoring. F/u t4 level.   10/18 patient is up and walking, legal hold stared per psych, I had long conversation with patient regarding plan of care, I discussed with GI and per GI team no plan for intervention during this admission, patient continues to refuse to eat or take po medications, I asked patient how she does at home and she said she is able to take meds at home w/o problems, I told her we need to try here in the hospital to see if she is able to tolerate po meds and foot and she refused she said she knows when she get back home she will be able to take po but not here in the hospital, she is asking for more iv diazepam and dilaudid.   10/19 patient is at nurse station, no fever or chills, she is been ambulating, still hyperverbal and tangential, discussed with psych team, they are recommended MRI brain, she agree with test.   10/20 in chair, she is about to have breakfast, she is in good spirit today, she is willing to eat, discussed with dietitian. Discussed with urology team, milton cath placed since patient had not urinated in 3 days, urine cx done and came back neg, per urology ok to remove milton and do voiding trial.   10/21 in chair, she is tolerating diet better, no new complains, increased valproic acid as recommended by psych, continue close monitoring. Replacing electrolytes Mg and phos.   10/22 in bed, milton in place, sitter at bedside, not in distress but asking for diazepam for muscle spasm, she has not received diazepam since yesterday discussed with nurse staff, she stated she is eating a little better.   10/23 patient in bed, c/o back pain, had a long discussion with patient regarding her plan of care, patient  complaining about her room being too cold and dirty, I discussed with psych and GI, MRI brain with and without ordered, discussed with nurse staff and CM.     10/24 Patient seen and evaluated at bedside. She appears calm and states she is able to drink fluids however has difficulty with meals and swallowing pills. She states her symptoms resolve with esophageal dilation and would like to speak with Dr Davalos. Case discussed with GI and they met with the patient at bedside. At this point the plan is to encourage oral nutrition as much as possible to avoid placement of feeding tube. Another upper GI scope would not provide additional benefit. MRI brain was negative for acute process. EEG is pending. Psych evaluated patient and plan is to continue depakote and check levels on Thursday. She will likely need to be transferred to inpatient psych once she is tolerating po diet and cleared from a neurological standpoint. Patient is willing to try oral solution of her meds. Will switch from IV depakote to oral solution. If patient unable to tolerate oral solution will switch back to IV     I have discussed this patient's plan of care and discharge plan at IDT rounds today with Case Management, Nursing, Nursing leadership, and other members of the IDT team.    Consultants/Specialty  GI  Psych   Neuro.         Code Status  Full Code    Disposition  The patient is not medically cleared for discharge to home or a post-acute facility.  Anticipate discharge to: a psychiatric hospital    I have placed the appropriate orders for post-discharge needs.    Review of Systems  Review of Systems   Constitutional:  Negative for chills and fever.   Eyes:  Negative for blurred vision and double vision.   Respiratory:  Negative for cough, hemoptysis and wheezing.    Cardiovascular:  Negative for chest pain, palpitations, claudication, leg swelling and PND.   Gastrointestinal:  Positive for abdominal pain. Negative for heartburn, nausea and  vomiting.   Genitourinary:  Negative for hematuria and urgency.   Musculoskeletal:  Negative for back pain and myalgias.   Skin:  Negative for rash.   Neurological:  Negative for dizziness and headaches.   Endo/Heme/Allergies:  Does not bruise/bleed easily.   Psychiatric/Behavioral:  Negative for depression.         Physical Exam  Temp:  [36.3 °C (97.4 °F)-36.4 °C (97.6 °F)] 36.3 °C (97.4 °F)  Pulse:  [] 99  Resp:  [16-18] 17  BP: (104-141)/(56-79) 125/58  SpO2:  [96 %-100 %] 100 %    Physical Exam  Vitals and nursing note reviewed.   Constitutional:       Appearance: Normal appearance. She is ill-appearing.   HENT:      Head: Normocephalic and atraumatic.      Nose: Nose normal.      Mouth/Throat:      Pharynx: Oropharynx is clear.   Eyes:      General:         Right eye: No discharge.         Left eye: No discharge.   Cardiovascular:      Rate and Rhythm: Normal rate and regular rhythm.      Pulses: Normal pulses.      Heart sounds: Normal heart sounds.   Pulmonary:      Effort: Pulmonary effort is normal.      Breath sounds: Normal breath sounds.   Abdominal:      General: Abdomen is flat. Bowel sounds are normal. There is no distension.      Palpations: Abdomen is soft.      Tenderness: There is no abdominal tenderness. There is no guarding.      Comments: Colostomy bag in place   Musculoskeletal:         General: Normal range of motion.      Cervical back: Normal range of motion and neck supple.   Skin:     General: Skin is warm and dry.   Neurological:      General: No focal deficit present.      Mental Status: She is alert and oriented to person, place, and time. Mental status is at baseline.   Psychiatric:      Comments: Anxious           Fluids    Intake/Output Summary (Last 24 hours) at 10/24/2023 1204  Last data filed at 10/24/2023 0340  Gross per 24 hour   Intake 600 ml   Output 1300 ml   Net -700 ml         Laboratory                            Imaging  MR-BRAIN-WITH & W/O   Final Result          No acute intracranial process.      Mildly prominent dural enhancement noted throughout the brain. Has the patient had a recent recent lumbar puncture?.      DX-SKULL-LIMITED 3-   Final Result      No metallic foreign bodies or devices are identified.      DX-CHEST-LIMITED (1 VIEW)   Final Result      1.  No acute cardiopulmonary abnormality identified.      2.  No contraindicated device      3.  Right midlung zone granuloma      IR-US GUIDED PIV   Final Result    Ultrasound-guided PERIPHERAL IV INSERTION performed by    qualified nursing staff as above.      IR-US GUIDED PIV   Final Result    Ultrasound-guided PERIPHERAL IV INSERTION performed by    qualified nursing staff as above.      IR-US GUIDED PIV   Final Result    Ultrasound-guided PERIPHERAL IV INSERTION performed by    qualified nursing staff as above.      DX-ESOPHAGUS BARIUM SWALLOW SINGLE CONTRAST   Final Result      1.  Distal esophageal lumen is narrowed down to less than 5 mm, likely representing a distal esophageal stricture. It does not widen during the examination.   2.  Several areas of minimal luminal narrowing of the proximal esophagus, down to 10 mm.   3.  Moderate mid and distal esophageal dysmotility.   4.  Gastroesophageal reflux.      CT-ABDOMEN-PELVIS WITH   Final Result      Prior colectomy with LEFT lower quadrant ileostomy   Catheter in place in the urinary bladder. The tip abuts the bladder dome.              Assessment/Plan  * Lactic acid acidosis- (present on admission)  Assessment & Plan  3.9 on presentation  In setting of dehydration, nausea/vomiting  IV fluid resuscitation  Trend lactic acid, have ordered repeat  Patient received IV fluid, lactic acidosis resolved  Monitoring.     Urinary retention- (present on admission)  Assessment & Plan  Sage cath placed last night  Patient is allergic to sulfas can't take flomax  Will need to f/u with urologist as outpatient.     Achalasia- (present on admission)  Assessment &  Plan  Grade II  Per GI patient needs f/u as outpatient at tertiary center  Barium swallow eval showed:   1. Distal esophageal lumen is narrowed down to less than 5 mm, likely representing a distal esophageal stricture. It does not widen during the examination.  2.  Several areas of minimal luminal narrowing of the proximal esophagus, down to 10 mm.  3.  Moderate mid and distal esophageal dysmotility.  4.  Gastroesophageal reflux.    Per GI no plan for intervention at this time since patient appears to be at baseline and patient was able to tolerate barium for study w/o problems. No additional benefit from further EGD at this point. The patient will need help from the primary doctor to see a specialist either in Tertiary center in [place] or [place] for possible myotomy for her type II achalasia.      Patient is eating better. Patient is still eating very slow and improved oral intake.     Abnormal TSH  Assessment & Plan  Normal t3 and t4.   borderline low tsh.       Delirium  Assessment & Plan  hyperverbal and tangential. Unable to answer questions appropriately. Need frequent redirection. Unclear etiology, due to Valium?  I have consulted psych, will decrease valium to 5mg iv bid prn  Check B12, B1, TSH  Appreciated psych recommendations.     Discussed with psych team.  F/u t3, t4 is normal.   depakote iv qhs per psych.   Discussed with psych they are recommending MRI brain, MRI brain negative for acute process.   Improved.     Hypophosphatemia  Assessment & Plan  Replaced as indicated with iv kphos  Replacing with iv phos.     Protein calorie malnutrition (HCC)  Assessment & Plan  Severe abdomina pain, nausea and vomiting, unable to oral diet for 1 to 2 weeks  I have ordered boost  Dietitian consult  Discussed with patient regaring plan of care, encouraged oral feeding as much possible to avoid feeding tube       Abdominal pain  Assessment & Plan  Diffuse abdominal pain, nausea and vomiting in the setting of Crohn  disease, status post colectomy  CT abdomen unremarkable for acute pathology.  ESR/CRP negative.  Denies bloody stool  Unclear etiology  Refused to take p.o. medications and diet. Requested iv dilaudid   GI rec caloproectin and speech evaluation, which are ordered  Multimodal pain managements including po and iv narcotics prn. Monitoring respiratory status and sedation score    1015: Patient reports abdominal pain, nausea and vomiting  Refuse to take po meds  She is able to take some of po diet  Pending esophagram   10/16: She reports significant abdominal pain, nausea and vomiting. Refused to take po meds  Esophagram reviewed  I have discussed with GI  Minimize narcotics use.     Continue c/o abdominal pain. Discussed with GI, they are recommending f/u as outpatient with tertiary center.   C/o abdominal pain but she is ambulating w/o any signs of pain or distress. Ostomy working.     Monitoring.   Improved.     Hypomagnesemia- (present on admission)  Assessment & Plan  Replaced.       Essential hypertension- (present on admission)  Assessment & Plan  Continue metoprolol, holding diuretics in setting of dehydration  Monitoring.     Tachycardia  Assessment & Plan  Has been persistently in 110's  In setting of dehydration  Have ordered twelve-lead EKG to further assess  Monitoring.   Stable.       Hypokalemia  Assessment & Plan  Refused po supplements  I have ordered iv kcl replacement  Monitoring.   F/u labs in am,     Dehydration  Assessment & Plan  With decreased urine output as well as decreased ostomy output  In setting of nausea and vomiting  IV fluids, monitor BMP and urine output, has started to make dark urine after initial IV fluid resuscitation  Antiemetics    Gently Iv fluids.  Needs to drink fluids  As per GI patient was able to drink contrast for barium eval w/o problems.   Increasing oral intake.     Crohn's disease of small intestine with complication (HCC)- (present on admission)  Assessment &  Plan  Has ostomy in place.  Follows with Dr. Davalos per patient  Recently completed Medrol Dosepak, will hold off on further steroids as unclear that there is current Crohn's flare given no ostomy output or findings of inflammation on CT abdomen  Check inflammatory markers ESR, CRP negative  No bloody bowel movement or obstruction, chance of acute Crohn small bowel flare is not high.   Ordered calprotectin fecal, patient refusing stool analysis.   Consulted GI, discussed with GI  calprotectin pending.   Discussed with GI today, no intervention planned.     calprotectin low.            VTE prophylaxis: heparin    I have performed a physical exam and reviewed and updated ROS and Plan today (10/24/2023). In review of yesterday's note (10/23/2023), there are no changes except as documented above.    I spent 52 minutes, reviewing the chart, obtaining and/or reviewing separately obtained history. Performing a medically appropriate examination and evaluation.  Counseling and educating the patient. Ordering and reviewing medications, tests, or procedures.  Discussing the case with GI.  Documenting clinical information in EPIC. Independently interpreting results and communicating results to patient. Discussing future disposition of care with patient, RN and case management.         O-Z Flap Text: The defect edges were debeveled with a #15 scalpel blade.  Given the location of the defect, shape of the defect and the proximity to free margins an O-Z flap was deemed most appropriate.  Using a sterile surgical marker, an appropriate transposition flap was drawn incorporating the defect and placing the expected incisions within the relaxed skin tension lines where possible. The area thus outlined was incised deep to adipose tissue with a #15 scalpel blade.  The skin margins were undermined to an appropriate distance in all directions utilizing iris scissors.

## 2023-10-24 NOTE — CARE PLAN
"    Shift Goals  Clinical Goals: ensure safety  Patient Goals: rest  Family Goals: madina   The patient is Stable - Low risk of patient condition declining or worsening  Progress made toward(s) clinical / shift goals:      Problem: Pain - Standard  Goal: Alleviation of pain or a reduction in pain to the patient’s comfort goal  Outcome: Progressing    Patient is not progressing towards the following goals:      Problem: Knowledge Deficit - Standard  Goal: Patient and family/care givers will demonstrate understanding of plan of care, disease process/condition, diagnostic tests and medications  Outcome: Not Progressing     Problem: Psychosocial  Goal: Patient's ability to re-evaluate and adapt role responsibilities will improve  Outcome: Not Progressing           Patient is agitated today after learning legal hold in place, although she was made aware of this prior to today. Patient requested pain medication stating she was having 8/10 pain all over and in her head (verbalized it was caused by a migraine). She refused the PO oxycodone ordered, requesting \"liquid\" medication. Communicated with MD and pharmacist - meds changed to oral solution. Patient then refused liquid meds expressing frustration that they weren't just being given IV. Patient was agitated at that time and refused education - requesting that she just be left alone.  "

## 2023-10-25 LAB
ANION GAP SERPL CALC-SCNC: 15 MMOL/L (ref 7–16)
BASOPHILS # BLD AUTO: 0.7 % (ref 0–1.8)
BASOPHILS # BLD: 0.04 K/UL (ref 0–0.12)
BUN SERPL-MCNC: 19 MG/DL (ref 8–22)
CALCIUM SERPL-MCNC: 10.7 MG/DL (ref 8.5–10.5)
CHLORIDE SERPL-SCNC: 105 MMOL/L (ref 96–112)
CO2 SERPL-SCNC: 21 MMOL/L (ref 20–33)
CREAT SERPL-MCNC: 1.17 MG/DL (ref 0.5–1.4)
EOSINOPHIL # BLD AUTO: 0.04 K/UL (ref 0–0.51)
EOSINOPHIL NFR BLD: 0.7 % (ref 0–6.9)
ERYTHROCYTE [DISTWIDTH] IN BLOOD BY AUTOMATED COUNT: 45 FL (ref 35.9–50)
GFR SERPLBLD CREATININE-BSD FMLA CKD-EPI: 50 ML/MIN/1.73 M 2
GLUCOSE SERPL-MCNC: 104 MG/DL (ref 65–99)
HCT VFR BLD AUTO: 42.7 % (ref 37–47)
HGB BLD-MCNC: 14 G/DL (ref 12–16)
IMM GRANULOCYTES # BLD AUTO: 0.01 K/UL (ref 0–0.11)
IMM GRANULOCYTES NFR BLD AUTO: 0.2 % (ref 0–0.9)
LYMPHOCYTES # BLD AUTO: 1.34 K/UL (ref 1–4.8)
LYMPHOCYTES NFR BLD: 22.2 % (ref 22–41)
MCH RBC QN AUTO: 30.6 PG (ref 27–33)
MCHC RBC AUTO-ENTMCNC: 32.8 G/DL (ref 32.2–35.5)
MCV RBC AUTO: 93.4 FL (ref 81.4–97.8)
MONOCYTES # BLD AUTO: 0.59 K/UL (ref 0–0.85)
MONOCYTES NFR BLD AUTO: 9.8 % (ref 0–13.4)
NEUTROPHILS # BLD AUTO: 4.02 K/UL (ref 1.82–7.42)
NEUTROPHILS NFR BLD: 66.4 % (ref 44–72)
NRBC # BLD AUTO: 0 K/UL
NRBC BLD-RTO: 0 /100 WBC (ref 0–0.2)
PLATELET # BLD AUTO: 192 K/UL (ref 164–446)
PMV BLD AUTO: 10.4 FL (ref 9–12.9)
POTASSIUM SERPL-SCNC: 4.2 MMOL/L (ref 3.6–5.5)
RBC # BLD AUTO: 4.57 M/UL (ref 4.2–5.4)
SODIUM SERPL-SCNC: 141 MMOL/L (ref 135–145)
WBC # BLD AUTO: 6 K/UL (ref 4.8–10.8)

## 2023-10-25 PROCEDURE — 700102 HCHG RX REV CODE 250 W/ 637 OVERRIDE(OP): Performed by: INTERNAL MEDICINE

## 2023-10-25 PROCEDURE — 36415 COLL VENOUS BLD VENIPUNCTURE: CPT

## 2023-10-25 PROCEDURE — 770001 HCHG ROOM/CARE - MED/SURG/GYN PRIV*

## 2023-10-25 PROCEDURE — 700105 HCHG RX REV CODE 258: Performed by: INTERNAL MEDICINE

## 2023-10-25 PROCEDURE — 700102 HCHG RX REV CODE 250 W/ 637 OVERRIDE(OP): Performed by: STUDENT IN AN ORGANIZED HEALTH CARE EDUCATION/TRAINING PROGRAM

## 2023-10-25 PROCEDURE — 700111 HCHG RX REV CODE 636 W/ 250 OVERRIDE (IP): Performed by: STUDENT IN AN ORGANIZED HEALTH CARE EDUCATION/TRAINING PROGRAM

## 2023-10-25 PROCEDURE — A9270 NON-COVERED ITEM OR SERVICE: HCPCS | Performed by: STUDENT IN AN ORGANIZED HEALTH CARE EDUCATION/TRAINING PROGRAM

## 2023-10-25 PROCEDURE — 80048 BASIC METABOLIC PNL TOTAL CA: CPT

## 2023-10-25 PROCEDURE — 700111 HCHG RX REV CODE 636 W/ 250 OVERRIDE (IP): Performed by: HOSPITALIST

## 2023-10-25 PROCEDURE — 85025 COMPLETE CBC W/AUTO DIFF WBC: CPT

## 2023-10-25 PROCEDURE — 99233 SBSQ HOSP IP/OBS HIGH 50: CPT | Performed by: INTERNAL MEDICINE

## 2023-10-25 PROCEDURE — A9270 NON-COVERED ITEM OR SERVICE: HCPCS | Performed by: INTERNAL MEDICINE

## 2023-10-25 PROCEDURE — 700111 HCHG RX REV CODE 636 W/ 250 OVERRIDE (IP): Performed by: INTERNAL MEDICINE

## 2023-10-25 RX ORDER — VALPROIC ACID 250 MG/5ML
250 SOLUTION ORAL EVERY MORNING
Status: DISCONTINUED | OUTPATIENT
Start: 2023-10-26 | End: 2023-10-26

## 2023-10-25 RX ORDER — VALPROIC ACID 250 MG/5ML
500 SOLUTION ORAL EVERY EVENING
Status: DISCONTINUED | OUTPATIENT
Start: 2023-10-25 | End: 2023-10-26

## 2023-10-25 RX ADMIN — HEPARIN SODIUM 5000 UNITS: 5000 INJECTION, SOLUTION INTRAVENOUS; SUBCUTANEOUS at 06:07

## 2023-10-25 RX ADMIN — ONDANSETRON 4 MG: 2 INJECTION INTRAMUSCULAR; INTRAVENOUS at 18:42

## 2023-10-25 RX ADMIN — ONDANSETRON 4 MG: 2 INJECTION INTRAMUSCULAR; INTRAVENOUS at 13:02

## 2023-10-25 RX ADMIN — HYDROMORPHONE HYDROCHLORIDE 0.5 MG: 1 INJECTION, SOLUTION INTRAMUSCULAR; INTRAVENOUS; SUBCUTANEOUS at 10:21

## 2023-10-25 RX ADMIN — DIAZEPAM 5 MG: 5 INJECTION, SOLUTION INTRAMUSCULAR; INTRAVENOUS at 08:02

## 2023-10-25 RX ADMIN — VALPROATE SODIUM 250 MG: 100 INJECTION, SOLUTION INTRAVENOUS at 06:06

## 2023-10-25 RX ADMIN — HYDROMORPHONE HYDROCHLORIDE 0.5 MG: 1 INJECTION, SOLUTION INTRAMUSCULAR; INTRAVENOUS; SUBCUTANEOUS at 22:04

## 2023-10-25 RX ADMIN — HYDROMORPHONE HYDROCHLORIDE 0.5 MG: 1 INJECTION, SOLUTION INTRAMUSCULAR; INTRAVENOUS; SUBCUTANEOUS at 03:21

## 2023-10-25 RX ADMIN — DIAZEPAM 5 MG: 5 INJECTION, SOLUTION INTRAMUSCULAR; INTRAVENOUS at 16:08

## 2023-10-25 RX ADMIN — ONDANSETRON 4 MG: 2 INJECTION INTRAMUSCULAR; INTRAVENOUS at 05:19

## 2023-10-25 RX ADMIN — HEPARIN SODIUM 5000 UNITS: 5000 INJECTION, SOLUTION INTRAVENOUS; SUBCUTANEOUS at 22:04

## 2023-10-25 RX ADMIN — VALPROIC ACID 500 MG: 500 SOLUTION ORAL at 17:26

## 2023-10-25 ASSESSMENT — ENCOUNTER SYMPTOMS
NAUSEA: 0
DEPRESSION: 0
HEARTBURN: 0
PND: 0
HEADACHES: 0
CLAUDICATION: 0
BLURRED VISION: 0
PALPITATIONS: 0
FEVER: 0
MYALGIAS: 0
WHEEZING: 0
VOMITING: 0
BRUISES/BLEEDS EASILY: 0
COUGH: 0
DIZZINESS: 0
HEMOPTYSIS: 0
BACK PAIN: 0
DOUBLE VISION: 0
ABDOMINAL PAIN: 1
CHILLS: 0

## 2023-10-25 ASSESSMENT — PAIN DESCRIPTION - PAIN TYPE: TYPE: ACUTE PAIN

## 2023-10-25 NOTE — PROGRESS NOTES
"Hospital Medicine Daily Progress Note    Date of Service  10/25/2023    Chief Complaint  Jana Overton is a 68 y.o. female admitted 10/12/2023 with abdominal pain, nausea    Hospital Course  68 y.o. female who presented 10/12/2023  with urinary retention, nausea.  Patient has a history of Crohn's disease status post colectomy with ileostomy on left side, hypertension, chronic pain.  She has had 2 recent emergency room visits due to worsening of chronic abdominal pain and difficulty tolerating p.o. intake with failure of p.o. Zofran at home.  On her last ED visit she had a borderline FLORES with creatinine increased to 1.5 from baseline of 1.2.  She was discharged home after some IV fluids.  She presents again due to concern for decreased urinary output.  She had a Sage placed at urology office for unclear reason however did not drain much urine.  Kidney function stable from last week however did have elevated lactic acid to 3.9.  After some IV fluids she is making some dark urine.  Due to concern for elevated lactic acid and inability to tolerate p.o. intake admission was requested.  CT abdomen unremarkable  Lactic acid 3.9 on admission, which has been resolved  ESR, CRP negative    Interval Problem Update  Seen patient at bedside  Patient is very hyperverbal and tangential.   Unable to answer questions appropriately  She refused to take po meds. State po meds will make her nausea, vomiting  However she is able to take po diet, however per patient she can only take one bite every 10 mins  She reports significant abdominal pain and leg cram. She has been talking in the room continuously. No sign of severe pain. Asking dialudid and valium iv. She states \"I used to inject iv dilaudid 4mg every 2h to my chest.\" \"I took valium 5mg for muscle spasm. If not working in 20 mins, I took another 5mg.\"    Paranoid and delirium  I have spoke to psychiatry, concerning delirium due to   I have spoke to psychiatry  Concerning " delirium due to Valium, I have reduced valium to 5mg bid prn  Ordered VitB1, B12, TSH  Discussed with GI      10/17 in bed, patient is new to me today, discussed with GI and psych team, continue supportive treatment will start depakote per psych recommendation, iv fluids, close monitoring. F/u t4 level.   10/18 patient is up and walking, legal hold stared per psych, I had long conversation with patient regarding plan of care, I discussed with GI and per GI team no plan for intervention during this admission, patient continues to refuse to eat or take po medications, I asked patient how she does at home and she said she is able to take meds at home w/o problems, I told her we need to try here in the hospital to see if she is able to tolerate po meds and foot and she refused she said she knows when she get back home she will be able to take po but not here in the hospital, she is asking for more iv diazepam and dilaudid.   10/19 patient is at nurse station, no fever or chills, she is been ambulating, still hyperverbal and tangential, discussed with psych team, they are recommended MRI brain, she agree with test.   10/20 in chair, she is about to have breakfast, she is in good spirit today, she is willing to eat, discussed with dietitian. Discussed with urology team, milton cath placed since patient had not urinated in 3 days, urine cx done and came back neg, per urology ok to remove milton and do voiding trial.   10/21 in chair, she is tolerating diet better, no new complains, increased valproic acid as recommended by psych, continue close monitoring. Replacing electrolytes Mg and phos.   10/22 in bed, milton in place, sitter at bedside, not in distress but asking for diazepam for muscle spasm, she has not received diazepam since yesterday discussed with nurse staff, she stated she is eating a little better.   10/23 patient in bed, c/o back pain, had a long discussion with patient regarding her plan of care, patient  complaining about her room being too cold and dirty, I discussed with psych and GI, MRI brain with and without ordered, discussed with nurse staff and CM.     10/24 Patient seen and evaluated at bedside. She appears calm and states she is able to drink fluids however has difficulty with meals and swallowing pills. She states her symptoms resolve with esophageal dilation and would like to speak with Dr Davalos. Case discussed with GI and they met with the patient at bedside. At this point the plan is to encourage oral nutrition as much as possible to avoid placement of feeding tube. Another upper GI scope would not provide additional benefit. MRI brain was negative for acute process. EEG is pending. Psych evaluated patient and plan is to continue depakote and check levels on Thursday. She will likely need to be transferred to inpatient psych once she is tolerating po diet and cleared from a neurological standpoint. Patient is willing to try oral solution of her meds. Will switch from IV depakote to oral solution. If patient unable to tolerate oral solution will switch back to IV     10/25 Patient refused oral solution of her depakote last night. I counseled the patient on importance of taking her medications and that we must transition her from IV to oral meds prior to discharge. Patient states she will try taking it today. EEG was negative for seizure like activity. Her encephalopathy work up thus far has been negative. Results and plan of care reviewed with patient and her . Plan to discharge patient tomorrow if legal hold has been rescinded.     I have discussed this patient's plan of care and discharge plan at IDT rounds today with Case Management, Nursing, Nursing leadership, and other members of the IDT team.    Consultants/Specialty  GI  Psych   Neuro.         Code Status  Full Code    Disposition  The patient is medically cleared for discharge to home or a post-acute facility.      I have placed the  appropriate orders for post-discharge needs.    Review of Systems  Review of Systems   Constitutional:  Negative for chills and fever.   Eyes:  Negative for blurred vision and double vision.   Respiratory:  Negative for cough, hemoptysis and wheezing.    Cardiovascular:  Negative for chest pain, palpitations, claudication, leg swelling and PND.   Gastrointestinal:  Positive for abdominal pain. Negative for heartburn, nausea and vomiting.   Genitourinary:  Negative for hematuria and urgency.   Musculoskeletal:  Negative for back pain and myalgias.   Skin:  Negative for rash.   Neurological:  Negative for dizziness and headaches.   Endo/Heme/Allergies:  Does not bruise/bleed easily.   Psychiatric/Behavioral:  Negative for depression.         Physical Exam  Temp:  [36.2 °C (97.1 °F)-36.7 °C (98 °F)] 36.7 °C (98 °F)  Pulse:  [] 116  Resp:  [17-18] 17  BP: (128-151)/(76-78) 128/76  SpO2:  [95 %-96 %] 96 %    Physical Exam  Vitals and nursing note reviewed.   Constitutional:       Appearance: Normal appearance. She is ill-appearing.   HENT:      Head: Normocephalic and atraumatic.      Nose: Nose normal.      Mouth/Throat:      Pharynx: Oropharynx is clear.   Eyes:      General:         Right eye: No discharge.         Left eye: No discharge.   Cardiovascular:      Rate and Rhythm: Normal rate and regular rhythm.      Pulses: Normal pulses.      Heart sounds: Normal heart sounds.   Pulmonary:      Effort: Pulmonary effort is normal.      Breath sounds: Normal breath sounds.   Abdominal:      General: Abdomen is flat. Bowel sounds are normal. There is no distension.      Palpations: Abdomen is soft.      Tenderness: There is no abdominal tenderness. There is no guarding.      Comments: Colostomy bag in place   Musculoskeletal:         General: Normal range of motion.      Cervical back: Normal range of motion and neck supple.   Skin:     General: Skin is warm and dry.   Neurological:      General: No focal deficit  present.      Mental Status: She is alert and oriented to person, place, and time. Mental status is at baseline.   Psychiatric:      Comments: Anxious           Fluids    Intake/Output Summary (Last 24 hours) at 10/25/2023 1518  Last data filed at 10/25/2023 1000  Gross per 24 hour   Intake 240 ml   Output --   Net 240 ml         Laboratory  Recent Labs     10/25/23  1347   WBC 6.0   RBC 4.57   HEMOGLOBIN 14.0   HEMATOCRIT 42.7   MCV 93.4   MCH 30.6   MCHC 32.8   RDW 45.0   PLATELETCT 192   MPV 10.4       Recent Labs     10/25/23  1347   SODIUM 141   POTASSIUM 4.2   CHLORIDE 105   CO2 21   GLUCOSE 104*   BUN 19   CREATININE 1.17   CALCIUM 10.7*                     Imaging  MR-BRAIN-WITH & W/O   Final Result         No acute intracranial process.      Mildly prominent dural enhancement noted throughout the brain. Has the patient had a recent recent lumbar puncture?.      DX-SKULL-LIMITED 3-   Final Result      No metallic foreign bodies or devices are identified.      DX-CHEST-LIMITED (1 VIEW)   Final Result      1.  No acute cardiopulmonary abnormality identified.      2.  No contraindicated device      3.  Right midlung zone granuloma      IR-US GUIDED PIV   Final Result    Ultrasound-guided PERIPHERAL IV INSERTION performed by    qualified nursing staff as above.      IR-US GUIDED PIV   Final Result    Ultrasound-guided PERIPHERAL IV INSERTION performed by    qualified nursing staff as above.      IR-US GUIDED PIV   Final Result    Ultrasound-guided PERIPHERAL IV INSERTION performed by    qualified nursing staff as above.      DX-ESOPHAGUS BARIUM SWALLOW SINGLE CONTRAST   Final Result      1.  Distal esophageal lumen is narrowed down to less than 5 mm, likely representing a distal esophageal stricture. It does not widen during the examination.   2.  Several areas of minimal luminal narrowing of the proximal esophagus, down to 10 mm.   3.  Moderate mid and distal esophageal dysmotility.   4.  Gastroesophageal  reflux.      CT-ABDOMEN-PELVIS WITH   Final Result      Prior colectomy with LEFT lower quadrant ileostomy   Catheter in place in the urinary bladder. The tip abuts the bladder dome.              Assessment/Plan  * Lactic acid acidosis- (present on admission)  Assessment & Plan  3.9 on presentation  In setting of dehydration, nausea/vomiting  IV fluid resuscitation  Trend lactic acid, have ordered repeat  Patient received IV fluid, lactic acidosis resolved  Monitoring.     Urinary retention- (present on admission)  Assessment & Plan  Sage cath placed last night  Patient is allergic to sulfas can't take flomax  Will need to f/u with urologist as outpatient.     Achalasia- (present on admission)  Assessment & Plan  Grade II  Per GI patient needs f/u as outpatient at tertiary center  Barium swallow eval showed:   1. Distal esophageal lumen is narrowed down to less than 5 mm, likely representing a distal esophageal stricture. It does not widen during the examination.  2.  Several areas of minimal luminal narrowing of the proximal esophagus, down to 10 mm.  3.  Moderate mid and distal esophageal dysmotility.  4.  Gastroesophageal reflux.    Per GI no plan for intervention at this time since patient appears to be at baseline and patient was able to tolerate barium for study w/o problems. No additional benefit from further EGD at this point. The patient will need help from the primary doctor to see a specialist either in Tertiary center in [place] or [place] for possible myotomy for her type II achalasia.      Patient is eating better. Patient is still eating very slow and improved oral intake.     Abnormal TSH  Assessment & Plan  Normal t3 and t4.   borderline low tsh.       Delirium  Assessment & Plan  hyperverbal and tangential. Unable to answer questions appropriately. Need frequent redirection. Unclear etiology, due to Valium?  I have consulted psych, will decrease valium to 5mg iv bid prn  Check B12, B1,  TSH  Appreciated psych recommendations.     Discussed with psych team.  F/u t3, t4 is normal.   depakote iv qhs per psych.   Discussed with psych they are recommending MRI brain, MRI brain negative for acute process.   EEG negative    Hypophosphatemia  Assessment & Plan  Replaced as indicated with iv kphos  Replacing with iv phos.     Abdominal pain  Assessment & Plan  Diffuse abdominal pain, nausea and vomiting in the setting of Crohn disease, status post colectomy  CT abdomen unremarkable for acute pathology.  ESR/CRP negative.  Denies bloody stool  Unclear etiology  Refused to take p.o. medications and diet. Requested iv dilaudid   GI rec caloproectin and speech evaluation, which are ordered  Multimodal pain managements including po and iv narcotics prn. Monitoring respiratory status and sedation score    1015: Patient reports abdominal pain, nausea and vomiting  Refuse to take po meds  She is able to take some of po diet  Pending esophagram   10/16: She reports significant abdominal pain, nausea and vomiting. Refused to take po meds  Esophagram reviewed  I have discussed with GI  Minimize narcotics use.     Continue c/o abdominal pain. Discussed with GI, they are recommending f/u as outpatient with tertiary center.   C/o abdominal pain but she is ambulating w/o any signs of pain or distress. Ostomy working.     Monitoring.   Improved.     Hypomagnesemia- (present on admission)  Assessment & Plan  Replaced.       Essential hypertension- (present on admission)  Assessment & Plan  Continue metoprolol, holding diuretics in setting of dehydration  Monitoring.     Tachycardia  Assessment & Plan  Has been persistently in 110's  In setting of dehydration  Have ordered twelve-lead EKG to further assess  Monitoring.   Stable.       Hypokalemia  Assessment & Plan  Refused po supplements  I have ordered iv kcl replacement  Monitoring.   F/u labs in am,     Dehydration  Assessment & Plan  With decreased urine output as well  as decreased ostomy output  In setting of nausea and vomiting  IV fluids, monitor BMP and urine output, has started to make dark urine after initial IV fluid resuscitation  Antiemetics    IVF prn  Encouraged oral intake  As per GI patient was able to drink contrast for barium eval w/o problems.       Crohn's disease of small intestine with complication (HCC)- (present on admission)  Assessment & Plan  Has ostomy in place.  Follows with Dr. Davalos per patient  Recently completed Medrol Dosepak, will hold off on further steroids as unclear that there is current Crohn's flare given no ostomy output or findings of inflammation on CT abdomen  Check inflammatory markers ESR, CRP negative  No bloody bowel movement or obstruction, chance of acute Crohn small bowel flare is not high.   Ordered calprotectin fecal, patient refusing stool analysis.   Consulted GI, discussed with GI  calprotectin pending.   Discussed with GI today, no intervention planned.     calprotectin low.            VTE prophylaxis: heparin    I have performed a physical exam and reviewed and updated ROS and Plan today (10/25/2023). In review of yesterday's note (10/24/2023), there are no changes except as documented above.    I spent 51 minutes, reviewing the chart, obtaining and/or reviewing separately obtained history. Performing a medically appropriate examination and evaluation.  Counseling and educating the patient. Ordering and reviewing medications, tests, or procedures.  Discussing the case with GI.  Documenting clinical information in EPIC. Independently interpreting results and communicating results to patient. Discussing future disposition of care with patient, RN and case management.

## 2023-10-25 NOTE — DISCHARGE PLANNING
Legal Hold    Referral: Legal Hold Court     Intervention: Pt met with court MD's via telemedicine monitor to contest the legal hold.     Court MD's released pt from legal hold. Pt is aware that cannot leave the hospital until Stipulation and Order releasing from hold is received.     Once court order is received will scan into pt's chart and notify bedside RN.     All legal hold precautions remain in place until court order is received 10/26.

## 2023-10-25 NOTE — PROGRESS NOTES
"Attempted to give depakote oral solution as scheduled. Patient refused. Attempted to educate on importance of trying to manage health on oral medications in preparation for discharge. Patient stated she cannot take many things oral because they \"dissolve in\" her esophagus and \"cause a reaction\". Attempted to reinforce that the legal hold in place was in relation to self care and medication are an important part of self care - IV medications cannot work at home. Patient stated she did not want to talk about that, but she will not take the medication. She stated she will leave and take her medications on her own schedule at home, stated she would call (?Dory) for a ride. Reminded patient that under a legal hold she is unable to leave. Patient stated she was done having this conversation. MD notified of patient refusal.  "

## 2023-10-25 NOTE — DOCUMENTATION QUERY
"                                                                         [facility]                                                                       Query Response Note      PATIENT:               YONATHAN JUDD  ACCT #:                  [id number]  MRN:                     [id number]  :                      1955  ADMIT DATE:       10/12/2023 10:27 PM  DISCH DATE:          RESPONDING  PROVIDER #:        [id number]           QUERY TEXT:    Please provide additional clinical indicators supportive of the documented diagnosis of protein calorie malnutrition.    The patient's Clinical Indicators include:  69yo with dx of acidosis, crohn's disease, FLORES, retention of urine    10/14 [facility] PN \"Protein calorie malnutrition\"    10/15 RD note \"Malnutrition Risk: Pt does not meet ASPEN criteria for malnutrition at this time\"    Risk factors: advanced age, FLORES, Crohn's disease, COPD, atrial fibrillation  Treatments: RD consult, supplements, labwork, monitoring    Contact me with questions.     Thank you,  ORTIZ Easton, CDI  [email or web address]  Options provided:   -- Protein calorie malnutrition exists (please specify severity) -, please document additional clinical indicators   -- Diagnosis of protein calorie malnutrition does not exist and amended documentation provided in the medical record   -- Other explanation:other explanation-, please specify   -- Unable to determine      Query created by: Kevin April on 10/25/2023 7:12 AM    RESPONSE TEXT:    Diagnosis of protein calorie malnutrition does not exist and amended documentation provided in the medical record          Electronically signed by:  DONTE WHITE MD 10/25/2023 3:18 PM              "

## 2023-10-25 NOTE — CARE PLAN
The patient is Stable - Low risk of patient condition declining or worsening    Shift Goals  Clinical Goals: patient free of falls  Patient Goals: conversation  Family Goals: madina    Patient is not progressing towards the following goals:      Problem: Psychosocial  Goal: Patient's level of anxiety will decrease  Description: Target End Date:  1-3 days or as soon as patient condition allows    1.  Collaborate with patient and family/caregiver to identify triggers and develop strategies to cope with anxiety  2.  Implement stimuli reduction, calming techniques  3.  Pharmacologic management per provider order  4.  Encourage patient/family/care giver participation  5.  Collaborate with interdisciplinary team including Psychologist or Behavioral Health Team as needed  Outcome: Not Progressing

## 2023-10-25 NOTE — CARE PLAN
A refill request was received for:  Requested Prescriptions     Pending Prescriptions Disp Refills    LOSARTAN 25 MG Oral Tab [Pharmacy Med Name: LOSARTAN 25MG TABLETS] 90 tablet 0     Sig: TAKE 1 TABLET(25 MG) BY MOUTH DAILY     Last refill date:  06/15/2022  Qty: 90  Dx: HTN  Last office visit: 10/26/2022   When is follow up due: no follow up recommended on file but she is scheduled     Future Appointments   Date Time Provider Kirill Correa   5/9/2023  4:00 PM [address] [place] [address], Halima Maravilla, [facility] MED [facility] The patient is Stable - Low risk of patient condition declining or worsening    Shift Goals  Clinical Goals: ensure safety  Patient Goals: rest  Family Goals: madina    Progress made toward(s) clinical / shift goals: received patient in bed alert and oriented x3. Patient continues to decline oral medication. IV line infiltrated and subsequently removed. Two unsuccessful reinsertion attempts were made. Charge nurse notified, and the rapid response team successfully reinserted the IV. Administered scheduled and PRN medications. Sage in place and ostomy intact. Drains emptied. Hourly rounds performed and fall precautions in place. Call light within reach.        Patient is not progressing towards the following goals:      Problem: Pain - Standard  Goal: Alleviation of pain or a reduction in pain to the patient’s comfort goal  Outcome: Not Progressing     Problem: Knowledge Deficit - Standard  Goal: Patient and family/care givers will demonstrate understanding of plan of care, disease process/condition, diagnostic tests and medications  Outcome: Not Progressing  Note: Plan to advance goal:  explain disease process/ condition, treatment plan, diagnostic tests and medications every shift.

## 2023-10-25 NOTE — DISCHARGE PLANNING
Spoke to  Jason regarding patient's meeting with the court doctors today. Patient has been scheduled to meet with court doctors via telemedicine in the [address] room at 1050. Notified Charge CAMERON Cotton and bedside CAMERON Lamb of upcoming meeting this morning.

## 2023-10-26 LAB
ALBUMIN SERPL BCP-MCNC: 3.8 G/DL (ref 3.2–4.9)
ALBUMIN/GLOB SERPL: 1.3 G/DL
ALP SERPL-CCNC: 74 U/L (ref 30–99)
ALT SERPL-CCNC: 33 U/L (ref 2–50)
ANION GAP SERPL CALC-SCNC: 11 MMOL/L (ref 7–16)
AST SERPL-CCNC: 41 U/L (ref 12–45)
BILIRUB SERPL-MCNC: 0.4 MG/DL (ref 0.1–1.5)
BUN SERPL-MCNC: 19 MG/DL (ref 8–22)
BURR CELLS/RBC NFR CSF MANUAL: 0 %
C GATTII+NEOFOR DNA CSF QL NAA+NON-PROBE: NOT DETECTED
CALCIUM ALBUM COR SERPL-MCNC: 10.5 MG/DL (ref 8.5–10.5)
CALCIUM SERPL-MCNC: 10.3 MG/DL (ref 8.5–10.5)
CHLORIDE SERPL-SCNC: 104 MMOL/L (ref 96–112)
CLARITY CSF: NORMAL
CMV DNA CSF QL NAA+NON-PROBE: NOT DETECTED
CO2 SERPL-SCNC: 26 MMOL/L (ref 20–33)
COLOR CSF: NORMAL
COLOR SPUN CSF: NORMAL
COPPER SERPL-MCNC: 116.8 UG/DL (ref 80–155)
CREAT SERPL-MCNC: 0.98 MG/DL (ref 0.5–1.4)
CSF COMMENTS 1658: NORMAL
E COLI K1 DNA CSF QL NAA+NON-PROBE: NOT DETECTED
EV RNA CSF QL NAA+NON-PROBE: NOT DETECTED
GFR SERPLBLD CREATININE-BSD FMLA CKD-EPI: 62 ML/MIN/1.73 M 2
GLOBULIN SER CALC-MCNC: 3 G/DL (ref 1.9–3.5)
GLUCOSE CSF-MCNC: 55 MG/DL (ref 40–80)
GLUCOSE SERPL-MCNC: 79 MG/DL (ref 65–99)
GP B STREP DNA CSF QL NAA+NON-PROBE: NOT DETECTED
HAEM INFLU DNA CSF QL NAA+NON-PROBE: NOT DETECTED
HHV6 DNA CSF QL NAA+NON-PROBE: NOT DETECTED
HSV1 DNA CSF QL NAA+NON-PROBE: NOT DETECTED
HSV2 DNA CSF QL NAA+NON-PROBE: NOT DETECTED
L MONOCYTOG DNA CSF QL NAA+NON-PROBE: NOT DETECTED
N MEN DNA CSF QL NAA+NON-PROBE: NOT DETECTED
NUC CELL # CSF: 4 CELLS/UL (ref 0–10)
PARECHOVIRUS A RNA CSF QL NAA+NON-PROBE: NOT DETECTED
POTASSIUM SERPL-SCNC: 4.2 MMOL/L (ref 3.6–5.5)
PROT CSF-MCNC: 42 MG/DL (ref 15–45)
PROT SERPL-MCNC: 6.8 G/DL (ref 6–8.2)
RBC # CSF: 3000 CELLS/UL
S PNEUM DNA CSF QL NAA+NON-PROBE: NOT DETECTED
SODIUM SERPL-SCNC: 141 MMOL/L (ref 135–145)
SPECIMEN VOL CSF: 5 ML
TUBE # CSF: 3
TUBE # CSF: NORMAL
VALPROATE SERPL-MCNC: 51.6 UG/ML (ref 50–100)
VZV DNA CSF QL NAA+NON-PROBE: NOT DETECTED

## 2023-10-26 PROCEDURE — 700111 HCHG RX REV CODE 636 W/ 250 OVERRIDE (IP): Mod: JZ | Performed by: HOSPITALIST

## 2023-10-26 PROCEDURE — 770001 HCHG ROOM/CARE - MED/SURG/GYN PRIV*

## 2023-10-26 PROCEDURE — 700111 HCHG RX REV CODE 636 W/ 250 OVERRIDE (IP): Performed by: STUDENT IN AN ORGANIZED HEALTH CARE EDUCATION/TRAINING PROGRAM

## 2023-10-26 PROCEDURE — 700102 HCHG RX REV CODE 250 W/ 637 OVERRIDE(OP): Performed by: STUDENT IN AN ORGANIZED HEALTH CARE EDUCATION/TRAINING PROGRAM

## 2023-10-26 PROCEDURE — 700105 HCHG RX REV CODE 258: Performed by: INTERNAL MEDICINE

## 2023-10-26 PROCEDURE — 82945 GLUCOSE OTHER FLUID: CPT

## 2023-10-26 PROCEDURE — 700111 HCHG RX REV CODE 636 W/ 250 OVERRIDE (IP): Performed by: INTERNAL MEDICINE

## 2023-10-26 PROCEDURE — 86341 ISLET CELL ANTIBODY: CPT

## 2023-10-26 PROCEDURE — 80164 ASSAY DIPROPYLACETIC ACD TOT: CPT

## 2023-10-26 PROCEDURE — A9270 NON-COVERED ITEM OR SERVICE: HCPCS | Performed by: STUDENT IN AN ORGANIZED HEALTH CARE EDUCATION/TRAINING PROGRAM

## 2023-10-26 PROCEDURE — 99232 SBSQ HOSP IP/OBS MODERATE 35: CPT | Mod: GC | Performed by: PSYCHIATRY & NEUROLOGY

## 2023-10-26 PROCEDURE — 86255 FLUORESCENT ANTIBODY SCREEN: CPT

## 2023-10-26 PROCEDURE — 700102 HCHG RX REV CODE 250 W/ 637 OVERRIDE(OP): Performed by: INTERNAL MEDICINE

## 2023-10-26 PROCEDURE — 87483 CNS DNA AMP PROBE TYPE 12-25: CPT

## 2023-10-26 PROCEDURE — 99231 SBSQ HOSP IP/OBS SF/LOW 25: CPT | Performed by: STUDENT IN AN ORGANIZED HEALTH CARE EDUCATION/TRAINING PROGRAM

## 2023-10-26 PROCEDURE — 36415 COLL VENOUS BLD VENIPUNCTURE: CPT

## 2023-10-26 PROCEDURE — 99232 SBSQ HOSP IP/OBS MODERATE 35: CPT | Performed by: INTERNAL MEDICINE

## 2023-10-26 PROCEDURE — 86592 SYPHILIS TEST NON-TREP QUAL: CPT

## 2023-10-26 PROCEDURE — A9270 NON-COVERED ITEM OR SERVICE: HCPCS | Performed by: INTERNAL MEDICINE

## 2023-10-26 PROCEDURE — 80053 COMPREHEN METABOLIC PANEL: CPT

## 2023-10-26 PROCEDURE — 84157 ASSAY OF PROTEIN OTHER: CPT

## 2023-10-26 PROCEDURE — 89051 BODY FLUID CELL COUNT: CPT

## 2023-10-26 RX ORDER — LORAZEPAM 2 MG/ML
2 INJECTION INTRAMUSCULAR ONCE
Status: DISCONTINUED | OUTPATIENT
Start: 2023-10-26 | End: 2023-10-26

## 2023-10-26 RX ORDER — HYDROMORPHONE HYDROCHLORIDE 1 MG/ML
0.5 INJECTION, SOLUTION INTRAMUSCULAR; INTRAVENOUS; SUBCUTANEOUS ONCE
Status: COMPLETED | OUTPATIENT
Start: 2023-10-26 | End: 2023-10-26

## 2023-10-26 RX ADMIN — HYDROMORPHONE HYDROCHLORIDE 0.5 MG: 1 INJECTION, SOLUTION INTRAMUSCULAR; INTRAVENOUS; SUBCUTANEOUS at 12:34

## 2023-10-26 RX ADMIN — VALPROATE SODIUM 500 MG: 100 INJECTION, SOLUTION INTRAVENOUS at 12:37

## 2023-10-26 RX ADMIN — HEPARIN SODIUM 5000 UNITS: 5000 INJECTION, SOLUTION INTRAVENOUS; SUBCUTANEOUS at 06:02

## 2023-10-26 RX ADMIN — HYDROMORPHONE HYDROCHLORIDE 0.5 MG: 1 INJECTION, SOLUTION INTRAMUSCULAR; INTRAVENOUS; SUBCUTANEOUS at 14:50

## 2023-10-26 RX ADMIN — VALPROATE SODIUM 500 MG: 100 INJECTION, SOLUTION INTRAVENOUS at 17:19

## 2023-10-26 RX ADMIN — DIAZEPAM 5 MG: 5 INJECTION, SOLUTION INTRAMUSCULAR; INTRAVENOUS at 00:49

## 2023-10-26 RX ADMIN — DIAZEPAM 5 MG: 5 INJECTION, SOLUTION INTRAMUSCULAR; INTRAVENOUS at 11:27

## 2023-10-26 ASSESSMENT — ENCOUNTER SYMPTOMS
SHORTNESS OF BREATH: 0
ABDOMINAL PAIN: 1
NERVOUS/ANXIOUS: 1
FOCAL WEAKNESS: 0
NAUSEA: 1
VOMITING: 1

## 2023-10-26 NOTE — CONSULTS
"PSYCHIATRIC FOLLOW-UP:(established)   *Reason for admission: Nausea, vomiting, abdominal pain, poor PO intake   *Legal Hold Status: Discontinued by court toay   Chart reviewed.         *HPI:   Patient is acutely agitated, and continues to remain uncooperative in psych evaluation citing her legal hold as being unnecessary. Patient refused to speak about her GI consult and resulting treatment options despite expressing interest in previous days, now citing distrust of the psych team. Patient continues to refuse to take oral medications citing \"bubbling, vomiting, and throat burning\" if she were to ingest anything PO yet she has been able to tolerate liquid foods during her hospital stay. Patient continues to no longer express flight of ideas, pressured speech, or circumferential speech yet continues to exhibit poor insight into her stay, continually asking why she was placed on a legal hold despite the reasons being explained on multiple occasions almost each day of her stay. Psych interview was ended abruptly due to patient agitation and refusing to speak with the psych team. Patient is awaiting medical court hearing regarding her legal hold and denies any HI/SI.       Afternoon:   Psychiatry resident met with patient. Upon meeting with patient in the afternoon following Legal Hold release from court doctors, patient was addressed regarding her willingness to continue with her Depakote. Patient was showcasing flight of ideas and required numerous redirections. Ultimately patient relayed that she would be willing to continue her depakote at home and follow up with medication management with her primary care physician. She refused to get set up with or see an outpatient psychiatrist.         Medical ROS (as pertinent):     Unable to obtain due to patient agitation and refusal to participate in interview    *Psychiatric Examination:     Vitals:    10/25/23 1957   BP: 133/64   Pulse: 76   Resp: 18   Temp: 36.4 °C (97.5 " °F)   SpO2: 95%       General Appearance: Becomes distressed upon interaction with psych team, thin, appears stated age,  in hospital gown   Behavior: Uncooperative, guarded, irritable, good eye contact   Abnormal Movements:   · Psychomotor: no tics, tremors of dyskinesias, no dystonia   Gait and Posture: unable to assess , patient would not participate in this portion of the exam  Speech: Fluent, coherent,   Thought processes: Morning: Linear, coherent/ Afternoon: Flight of ideas.   Associations:  no loose associations   Abnormal or Psychotic Thoughts:No current delusions/ hallucinations.   Judgement and Insight: Poor insight into mood disorder, judgement limited by insight.   Orientation: Appears oriented to place and situation  Recent and Remote Memory: appears intact   Attention Span and Concentration: Appears intact   Language: English, fluent   Fund of Knowledge: Appropriate for age   Mood and Affect:   · Mood: Irritated   · Affect: Mood congruent, reactive, acute distress   SI/HI: Denies HI/SI     Labs (personally reviewed)    Lab Results   Component Value Date/Time    SODIUM 141 10/25/2023 01:47 PM    POTASSIUM 4.2 10/25/2023 01:47 PM    CHLORIDE 105 10/25/2023 01:47 PM    CO2 21 10/25/2023 01:47 PM    GLUCOSE 104 (H) 10/25/2023 01:47 PM    BUN 19 10/25/2023 01:47 PM    CREATININE 1.17 10/25/2023 01:47 PM    CREATININE 0.89 02/10/2010 04:04 PM    BUNCREATRAT 17 02/10/2010 04:04 PM    GLOMRATE >59 02/10/2010 04:04 PM      Recent Labs     10/20/23  0801   ASTSGOT 30   ALTSGPT 19   TBILIRUBIN 0.4   GLOBULIN 3.1     Lab Results   Component Value Date/Time    WBC 6.0 10/25/2023 01:47 PM    WBC 7.9 02/10/2010 04:04 PM    RBC 4.57 10/25/2023 01:47 PM    RBC 4.86 02/10/2010 04:04 PM    HEMOGLOBIN 14.0 10/25/2023 01:47 PM    HEMATOCRIT 42.7 10/25/2023 01:47 PM    MCV 93.4 10/25/2023 01:47 PM    MCV 86 02/10/2010 04:04 PM    MCH 30.6 10/25/2023 01:47 PM    MCH 27.8 02/10/2010 04:04 PM    MCHC 32.8 10/25/2023 01:47 PM     MPV 10.4 10/25/2023 01:47 PM    NEUTSPOLYS 66.40 10/25/2023 01:47 PM    LYMPHOCYTES 22.20 10/25/2023 01:47 PM    MONOCYTES 9.80 10/25/2023 01:47 PM    EOSINOPHILS 0.70 10/25/2023 01:47 PM    EOSINOPHILS 34 01/28/2021 02:40 PM    BASOPHILS 0.70 10/25/2023 01:47 PM    HYPOCHROMIA 1+ 03/15/2014 10:02 AM        MRI Brain w/wo contrast (10/23/23)   FINDINGS:       Diffusion-weighted images are normal. Gradient echo images are normal. There is no evidence of intercurrent hemorrhage.   No focal brain lesions are seen on the T2/FLAIR images. Ventricles are normal.   Mildly prominent dural enhancement is noted throughout the brain. The Sella is within normal limits.   The craniocervical junction is within normal limits.   No focal brain lesions are identified.   There is no evidence of intracranial mass or mass effect. No evidence of midline shift.   There are no extra axial collections.   Visualized intracranial arterial flow voids are within normal limits.   Bone marrow signal in the calvarium is within normal limits.   Included portions of the paranasal sinuses are within normal limits.   Included portions of the mastoid air cells are within normal limits.   Included portions of the orbits are within normal limits       IMPRESSION:           No acute intracranial process.       Mildly prominent dural enhancement noted throughout the brain. Has the patient had a recent recent lumbar puncture?.       *Labs personally reviewed:   Recent Results (from the past 24 hour(s))   T.PALLIDUM AB BIBI (SCREENING)   Collection Time: 10/24/23  4:42 PM   Result Value Ref Range   Syphilis, Treponemal Qual Non-Reactive Non-Reactive   RHEUMATOID ARTHRITIS FACTOR   Collection Time: 10/24/23  4:42 PM   Result Value Ref Range   Rheumatoid Factor -Neph- 10 0 - 14 IU/mL       Current medications:     Current Facility-Administered Medications:     valproic acid (Depakene) IR oral solution 500 mg, 500 mg, Oral, Q EVENING, Adilson Newby M.D., 500  mg at 10/25/23 1726    [START ON 10/26/2023] valproic acid (Depakene) IR oral solution 250 mg, 250 mg, Oral, QAM, Adilson Newby M.D.    oxyCODONE (Roxicodone) oral solution 5 mg, 5 mg, Oral, Q4HRS PRN **OR** oxyCODONE (Roxicodone) oral solution 10 mg, 10 mg, Oral, Q4HRS PRN, Adilson Newby M.D.    hydroxychloroquine oral suspension 25 mg/mL in oral syringe 300 mg, 300 mg, Oral, DAILY, Adilson Newby M.D.    SUMAtriptan (Imitrex) tablet 100 mg, 100 mg, Oral, Q2HRS PRN, MARCO Fitch    diazePAM (Valium) injection 5 mg, 5 mg, Intravenous, Q8HRS PRN, Rod Valencia M.D., 5 mg at 10/25/23 1608    [DISCONTINUED] oxyCODONE immediate-release (Roxicodone) tablet 5 mg, 5 mg, Oral, Q3HRS PRN **OR** [DISCONTINUED] oxyCODONE immediate release (Roxicodone) tablet 10 mg, 10 mg, Oral, Q3HRS PRN **OR** HYDROmorphone (Dilaudid) injection 0.5 mg, 0.5 mg, Intravenous, Q8HRS PRN, Rod Valencia M.D., 0.5 mg at 10/25/23 1021    heparin injection 5,000 Units, 5,000 Units, Subcutaneous, Q8HRS, Leonel Rodriguez M.D., 5,000 Units at 10/25/23 0607    Pharmacy Consult Request ...Pain Management Review 1 Each, 1 Each, Other, PHARMACY TO DOSE, Leonel Rodriguez M.D.    [] acetaminophen (Tylenol) tablet 650 mg, 650 mg, Oral, Q6HRS, 650 mg at 10/13/23 0613 **FOLLOWED BY** acetaminophen (Tylenol) tablet 650 mg, 650 mg, Oral, Q6HRS PRN, Leonel Rodriguez M.D.    ondansetron (Zofran) syringe/vial injection 4 mg, 4 mg, Intravenous, Q4HRS PRN, Leonel Rodriguez M.D., 4 mg at 10/25/23 1842    levalbuterol (Xopenex) 0.63 MG/3ML nebulizer solution 0.63 mg, 3 mL, Inhalation, Q4HRS PRN, Leonel Rodriguez M.D.    metoprolol tartrate (Lopressor) tablet 50 mg, 50 mg, Oral, BID, Leonel Rodriguez M.D., 50 mg at 10/25/23 1608      Assessment:   This is a 68-year-old female with past history per chart review of symptoms consistent with kely, unspecified anxiety and [facility] hospitalized for lactic acidosis.  During this  "hospitalization the patient met criteria for acute kely as she was grandiose, hyperverbal, had pressured speech, poor sleep, irritability, distractibility and had mood lability present. She was started on Depakote to address aforementioned symptoms for mood disorder. Patient also showcased cognitive impairment through a 16/30 on her MOCA administered 10/23. She has continued to distrust other physicians regarding her GI issues aside from Dr. Davalos, as well as distrust among the psychiatry team for placing her on a legal hold,     Patient is increasingly irritated, expressing desire to be released from her hold citing her ability to take her medications at home despite being unable to showcase consistent PO intake of meds at the hospital. Still refuses to take any PO medications citing \"bubbling, vomiting, and throat burning\" despite previous oral intake of food during hospital stay. On review of GI consult documentation , they advised the patient regarding trying to improve her oral intake and liquid diet to be able to avoid tube feeding and subsequently be able to discharge in order to later follow up with a possibly outpatient myotomy, though patient denied these to be accurate recollections and refused to discuss further with the psych team.     In the afternoon patient stated that she would be willing to continue with her depakote at home after legal hold release from court doctors. She refuses to see outpatient psychiatry        1. Manic disorder (unspecified, rule out medical etiology)       Medical :   See medical note       Plan:   · Legal hold: Legal hold discontinued by court MDs - Awaiting official documentation.   · Psychotropic medications:   Continue depakote 250mg Qd and 500mg QHS, would recommend offering this medication as an oral solution    Labs: Depakote level will be collected on Thursday 10/26/23  with a CBC and a CMP order and can be adjusted further at that time.     Will follow. Due to " performance on cognitive screening particularly on the visuospatial portions of the exams would recommend patient refrain from operating a motor vehicle until driving can be re-examined. Will discuss this with patient    Sitter: 1:1   Phone: yes   Visitors: family only   Personal belongings: yes

## 2023-10-26 NOTE — PROGRESS NOTES
Lumbar Puncture Procedure  Date of Service 10/26/23    Procedure:   Patient was informed of the potential risks and benefits associated with procedure and gave her consent (her  was also consented by phone). Patient was positioned in Left lateral recumbent position and was draped in usual sterile fashion. Patient was given Lidocaine subcutaneously to L3-L4 region, and 20g spinal was then inserted. After 2 attempts, approximately 10mL of CSF was extracted. Spinal needle was then removed and was found to be intact. Band aid placed over insertion site. Patient tolerated procedure well and without complication. CSF hand-delivered to Clinical Lab.     CIRO Barillas.  [facility]

## 2023-10-26 NOTE — PROGRESS NOTES
Hospital Medicine Daily Progress Note    Date of Service  10/26/2023    Chief Complaint  Jana Overton is a 68 y.o. female admitted 10/12/2023 with abdominal pain, nausea    Hospital Course  68 y.o. female who presented 10/12/2023  with urinary retention, nausea.  Patient has a history of Crohn's disease status post colectomy with ileostomy on left side, hypertension, chronic pain.  She has had 2 recent emergency room visits due to worsening of chronic abdominal pain and difficulty tolerating p.o. intake with failure of p.o. Zofran at home.  On her last ED visit she had a borderline FLORES with creatinine increased to 1.5 from baseline of 1.2.  She was discharged home after some IV fluids.  She presents again due to concern for decreased urinary output.  She had a Sage placed at urology office for unclear reason however did not drain much urine.  Kidney function stable from last week however did have elevated lactic acid to 3.9.  After some IV fluids she is making some dark urine.  Due to concern for elevated lactic acid and inability to tolerate p.o. intake admission was requested.  CT abdomen unremarkable  Lactic acid 3.9 on admission, which has been resolved  ESR, CRP negative    Interval Problem Update  Legal hold has been lifted, remove sitter  Patient says she wants to stay hospitalized to keep working with psychiatry and complete her work-up.  She is agreeable to increasing her Depakote dose and changing to IV as suggested by psychiatry.  She also agrees to a lumbar puncture today, completed.  She says she still has a hard time eating or drinking anything    I have discussed this patient's plan of care and discharge plan at IDT rounds today with Case Management, Nursing, Nursing leadership, and other members of the IDT team.    Consultants/Specialty  GI and psychiatry    Code Status  Full Code    Disposition  The patient is not medically cleared for discharge to home or a post-acute facility.  Anticipate  discharge to: home with close outpatient follow-up    I have placed the appropriate orders for post-discharge needs.    Review of Systems  Review of Systems   Constitutional:  Positive for malaise/fatigue.   Respiratory:  Negative for shortness of breath.    Cardiovascular:  Negative for chest pain.   Gastrointestinal:  Positive for abdominal pain, nausea and vomiting.   Neurological:  Negative for focal weakness.   Psychiatric/Behavioral:  The patient is nervous/anxious.         Physical Exam  Temp:  [36.1 °C (96.9 °F)-36.6 °C (97.9 °F)] 36.1 °C (96.9 °F)  Pulse:  [] 81  Resp:  [17-18] 17  BP: (125-133)/(46-64) 125/60  SpO2:  [94 %-98 %] 97 %    Physical Exam  Vitals and nursing note reviewed.   Constitutional:       General: She is not in acute distress.     Appearance: She is not toxic-appearing.      Comments: thin   HENT:      Head: Normocephalic.      Mouth/Throat:      Mouth: Mucous membranes are moist.   Eyes:      General:         Right eye: No discharge.         Left eye: No discharge.   Cardiovascular:      Rate and Rhythm: Normal rate and regular rhythm.   Pulmonary:      Effort: Pulmonary effort is normal. No respiratory distress.      Breath sounds: No wheezing or rales.   Abdominal:      Palpations: Abdomen is soft.      Tenderness: There is no abdominal tenderness.      Comments: Ostomy noted   Musculoskeletal:         General: No swelling.      Cervical back: Neck supple.   Skin:     General: Skin is warm and dry.   Neurological:      Mental Status: She is alert and oriented to person, place, and time.         Fluids    Intake/Output Summary (Last 24 hours) at 10/26/2023 1323  Last data filed at 10/26/2023 1119  Gross per 24 hour   Intake --   Output 600 ml   Net -600 ml       Laboratory  Recent Labs     10/25/23  1347   WBC 6.0   RBC 4.57   HEMOGLOBIN 14.0   HEMATOCRIT 42.7   MCV 93.4   MCH 30.6   MCHC 32.8   RDW 45.0   PLATELETCT 192   MPV 10.4     Recent Labs     10/25/23  1347  10/26/23  0616   SODIUM 141 141   POTASSIUM 4.2 4.2   CHLORIDE 105 104   CO2 21 26   GLUCOSE 104* 79   BUN 19 19   CREATININE 1.17 0.98   CALCIUM 10.7* 10.3                   Imaging  MR-BRAIN-WITH & W/O   Final Result         No acute intracranial process.      Mildly prominent dural enhancement noted throughout the brain. Has the patient had a recent recent lumbar puncture?.      DX-SKULL-LIMITED 3-   Final Result      No metallic foreign bodies or devices are identified.      DX-CHEST-LIMITED (1 VIEW)   Final Result      1.  No acute cardiopulmonary abnormality identified.      2.  No contraindicated device      3.  Right midlung zone granuloma      IR-US GUIDED PIV   Final Result    Ultrasound-guided PERIPHERAL IV INSERTION performed by    qualified nursing staff as above.      IR-US GUIDED PIV   Final Result    Ultrasound-guided PERIPHERAL IV INSERTION performed by    qualified nursing staff as above.      IR-US GUIDED PIV   Final Result    Ultrasound-guided PERIPHERAL IV INSERTION performed by    qualified nursing staff as above.      DX-ESOPHAGUS BARIUM SWALLOW SINGLE CONTRAST   Final Result      1.  Distal esophageal lumen is narrowed down to less than 5 mm, likely representing a distal esophageal stricture. It does not widen during the examination.   2.  Several areas of minimal luminal narrowing of the proximal esophagus, down to 10 mm.   3.  Moderate mid and distal esophageal dysmotility.   4.  Gastroesophageal reflux.      CT-ABDOMEN-PELVIS WITH   Final Result      Prior colectomy with LEFT lower quadrant ileostomy   Catheter in place in the urinary bladder. The tip abuts the bladder dome.              Assessment/Plan  * Lactic acid acidosis- (present on admission)  Assessment & Plan  3.9 on presentation  In setting of dehydration, nausea/vomiting  IV fluid resuscitation  Trend lactic acid, have ordered repeat  Patient received IV fluid, lactic acidosis resolved  Monitoring.     Urinary retention-  (present on admission)  Assessment & Plan  Sage cath placed last night  Patient is allergic to sulfas can't take flomax  Will need to f/u with urologist as outpatient.     Achalasia- (present on admission)  Assessment & Plan  Grade II  Per GI patient needs f/u as outpatient at tertiary center  Barium swallow eval showed:   1. Distal esophageal lumen is narrowed down to less than 5 mm, likely representing a distal esophageal stricture. It does not widen during the examination.  2.  Several areas of minimal luminal narrowing of the proximal esophagus, down to 10 mm.  3.  Moderate mid and distal esophageal dysmotility.  4.  Gastroesophageal reflux.    Per GI no plan for intervention at this time since patient appears to be at baseline and patient was able to tolerate barium for study w/o problems. No additional benefit from further EGD at this point. The patient will need help from the primary doctor to see a specialist either in Tertiary center in [place] or [place] for possible myotomy for her type II achalasia.    Patient to continue on working on her oral intake    Abnormal TSH  Assessment & Plan  Normal t3 and t4.   borderline low tsh.       Delirium  Assessment & Plan  hyperverbal and tangential. Unable to answer questions appropriately. Need frequent redirection. Unclear etiology, due to Valium?  I have consulted psych, will decrease valium to 5mg iv bid prn  Check B12, B1, TSH  Appreciated psych recommendations.     Discussed with psych team.  F/u t3, t4 is normal.   depakote iv qhs per psych.   Discussed with psych they are recommending MRI brain, MRI brain negative for acute process.   EEG negative  Lumbar puncture was done today.  Psychiatry recommended Depakote 500 mg twice daily IV, ordered    Hypophosphatemia  Assessment & Plan  Replaced as indicated with iv kphos  Replacing with iv phos.     Abdominal pain  Assessment & Plan  Diffuse abdominal pain, nausea and vomiting in the setting of Crohn disease,  status post colectomy  CT abdomen unremarkable for acute pathology.  ESR/CRP negative.  Denies bloody stool  Unclear etiology  Refused to take p.o. medications and diet. Requested iv dilaudid   GI rec caloproectin and speech evaluation, which are ordered  Multimodal pain managements including po and iv narcotics prn. Monitoring respiratory status and sedation score    1015: Patient reports abdominal pain, nausea and vomiting  Refuse to take po meds  She is able to take some of po diet  Pending esophagram   10/16: She reports significant abdominal pain, nausea and vomiting. Refused to take po meds  Esophagram reviewed  I have discussed with GI  Minimize narcotics use.     Continue c/o abdominal pain. Discussed with GI, they are recommending f/u as outpatient with tertiary center.   C/o abdominal pain but she is ambulating w/o any signs of pain or distress. Ostomy working.     Monitoring.     Hypomagnesemia- (present on admission)  Assessment & Plan  Replaced.       Essential hypertension- (present on admission)  Assessment & Plan  Continue metoprolol, holding diuretics in setting of dehydration  Monitoring.     Tachycardia  Assessment & Plan  Has been persistently in 110's  In setting of dehydration  Have ordered twelve-lead EKG to further assess  Monitoring.   Stable.       Hypokalemia  Assessment & Plan  Refused po supplements  I have ordered iv kcl replacement  Monitoring.   F/u labs in am,     Dehydration  Assessment & Plan  With decreased urine output as well as decreased ostomy output  In setting of nausea and vomiting  IV fluids, monitor BMP and urine output, has started to make dark urine after initial IV fluid resuscitation  Antiemetics    IVF prn  Encouraged oral intake  As per GI patient was able to drink contrast for barium eval w/o problems.       Crohn's disease of small intestine with complication (HCC)- (present on admission)  Assessment & Plan  Has ostomy in place.  Follows with Dr. Davalos per  patient  Recently completed Medrol Dosepak, will hold off on further steroids as unclear that there is current Crohn's flare given no ostomy output or findings of inflammation on CT abdomen  Check inflammatory markers ESR, CRP negative  No bloody bowel movement or obstruction, chance of acute Crohn small bowel flare is not high.   Ordered calprotectin fecal, patient refusing stool analysis.   Consulted GI, discussed with GI  calprotectin pending.   Discussed with GI today, no intervention planned.     calprotectin low.            VTE prophylaxis:   SCDs/TEDs      I have performed a physical exam and reviewed and updated ROS and Plan today (10/26/2023). In review of yesterday's note (10/25/2023), there are no changes except as documented above.

## 2023-10-26 NOTE — DISCHARGE PLANNING
Case Management Discharge Planning    Admission Date: 10/12/2023  GMLOS: 2.3  ALOS: 2    6-Clicks ADL Score: 20  6-Clicks Mobility Score: 24      Anticipated Discharge Dispo: Discharge Disposition: D/T to home under [facility] care in anticipation of covered skilled care (06)    DME Needed: No    Action(s) Taken: Updated Provider/Nurse on Discharge Plan    Escalations Completed: None    Medically Clear: No    Next Steps: awaiting lumber puncture today    Barriers to Discharge: Pending Procedures    Is the patient up for discharge tomorrow: No    RNCM still waiting for clearance paperwork for legal hold pt has a lumber puncture scheduled today.

## 2023-10-26 NOTE — CONSULTS
"PSYCHIATRIC FOLLOW-UP:(established)   *Reason for admission:   Nausea, vomiting, abdominal pain, poor PO intake.   *Legal Hold Status:    Discontinued by court 10/25/23   Chart reviewed.         *HPI:   Patient is hyperverbal and circumferential in her speech without flight of ideas and is cooperative regarding continued treatment at the hospital. Patient has had her legal hold lifted from court MD but expresses interest in continuing to stay at the hospital voluntarily until after her lumbar puncture and until she is able to tolerate her medications PO more as well. The patient is alert and oriented to time place person, denies SI/ HI/ Hallucinations, but continues to experience migraines, nausea, blurry vision, and abdominal pain. She was able to tolerate her breakfast but refuses to take more PO medications citing when she tried oral depakote yesterday she experienced \"burning and wrecking\" her throat. Patient agreed to continue depakote IV instead of PO. Agrees to an increased dose given subtherapeutic level and continued migraines. She complains of a dirty bedroom, with staff not coming to clean and tidy up and requests that her room be clean in preparation for her lumbar puncture after which she says she will be bed bound for a period of time due to pain. Patient was informed on our recommendation to refrain from operating a motor vehicle due to her performance on cognitive screening and visuospatial deficits, of which she expressed that she hadn't driven in 21 years and does not think her  license is active. This is in contrast to what her  reported on 10/19/23.    Medical ROS (as pertinent):     Positive: Migraine, Blurry vision, Nausea, Abdominal pain     *Psychiatric Examination:     Vitals:    10/26/23 0820   BP: 125/60   Pulse: 81   Resp: 17   Temp: 36.1 °C (96.9 °F)   SpO2: 97%           General Appearance:Irritated, thin, appears stated age , good hygiene, in hospital gown.   Behavior:  " "Cooperative, engaged, irritable, maintains eye contact   Abnormal Movements:   · Psychomotor:no tics, tremors of dyskinesias, no dystonia   Gait and Posture: appears steady with walker, shuffling gait not noted   Speech: Fluent, coherent, hyperverbal,   Thought processes: Linear, coherent   Associations: No loose associations   Abnormal or Psychotic Thoughts:  No current delusions/ hallucinations.   Judgement and Insight:Poor insight into mood disorder, judgment limited by insight.   Orientation: AxO x 4   Recent and Remote Memory: appears intact   Attention Span and Concentration: appears intact   Language: English, fluent   Fund of Knowledge: Appropriate for age   Mood and Affect:   · Mood: \"Annoyed\"   · Affect: Mood congruent, normal range   SI/HI: Denies SI/HI         *Labs personally reviewed:     Lab Results   Component Value Date/Time    WBC 6.0 10/25/2023 01:47 PM    WBC 7.9 02/10/2010 04:04 PM    RBC 4.57 10/25/2023 01:47 PM    RBC 4.86 02/10/2010 04:04 PM    HEMOGLOBIN 14.0 10/25/2023 01:47 PM    HEMATOCRIT 42.7 10/25/2023 01:47 PM    MCV 93.4 10/25/2023 01:47 PM    MCV 86 02/10/2010 04:04 PM    MCH 30.6 10/25/2023 01:47 PM    MCH 27.8 02/10/2010 04:04 PM    MCHC 32.8 10/25/2023 01:47 PM    MPV 10.4 10/25/2023 01:47 PM    NEUTSPOLYS 66.40 10/25/2023 01:47 PM    LYMPHOCYTES 22.20 10/25/2023 01:47 PM    MONOCYTES 9.80 10/25/2023 01:47 PM    EOSINOPHILS 0.70 10/25/2023 01:47 PM    EOSINOPHILS 34 01/28/2021 02:40 PM    BASOPHILS 0.70 10/25/2023 01:47 PM    HYPOCHROMIA 1+ 03/15/2014 10:02 AM      Recent Labs     10/20/23  0801 10/26/23  0616   ASTSGOT 30 41   ALTSGPT 19 33   TBILIRUBIN 0.4 0.4   GLOBULIN 3.1 3.0     Lab Results   Component Value Date/Time    SODIUM 141 10/26/2023 06:16 AM    POTASSIUM 4.2 10/26/2023 06:16 AM    CHLORIDE 104 10/26/2023 06:16 AM    CO2 26 10/26/2023 06:16 AM    GLUCOSE 79 10/26/2023 06:16 AM    BUN 19 10/26/2023 06:16 AM    CREATININE 0.98 10/26/2023 06:16 AM    CREATININE 0.89 " 02/10/2010 04:04 PM    BUNCREATRAT 17 02/10/2010 04:04 PM    GLOMRATE >59 02/10/2010 04:04 PM      VPA level (10/26/23)-51.6    Imaging:   MRI Brain w/wo contrast (10/23/23)   FINDINGS:        Diffusion-weighted images are normal. Gradient echo images are normal. There is no evidence of intercurrent hemorrhage.   No focal brain lesions are seen on the T2/FLAIR images. Ventricles are normal.   Mildly prominent dural enhancement is noted throughout the brain. The Sella is within normal limits.   The craniocervical junction is within normal limits.   No focal brain lesions are identified.   There is no evidence of intracranial mass or mass effect. No evidence of midline shift.   There are no extra axial collections.   Visualized intracranial arterial flow voids are within normal limits.   Bone marrow signal in the calvarium is within normal limits.   Included portions of the paranasal sinuses are within normal limits.   Included portions of the mastoid air cells are within normal limits.   Included portions of the orbits are within normal limits        IMPRESSION:        No acute intracranial process.        Mildly prominent dural enhancement noted throughout the brain. Has the patient had a recent recent lumbar puncture?.     Current medications:   Current Facility-Administered Medications   Medication Dose Route Frequency Provider Last Rate Last Admin   · valproate (Depacon) 500 mg in dextrose 5% 50 mL IVPB 500 mg Intravenous BID Rj Eason M.D.   · oxyCODONE (Roxicodone) oral solution 5 mg 5 mg Oral Q4HRS PRN Adilson Newby M.D.   Or   · oxyCODONE (Roxicodone) oral solution 10 mg 10 mg Oral Q4HRS PRN Adilson Newby M.D.   · hydroxychloroquine oral suspension 25 mg/mL in oral syringe 300 mg 300 mg Oral DAILY Adilson Newby M.D.   · SUMAtriptan (Imitrex) tablet 100 mg 100 mg Oral Q2HRS PRN MELODY Fitch.P.R.N.   · diazePAM (Valium) injection 5 mg 5 mg Intravenous Q8HRS PRN Rod Valencia M.D. 5 mg at  10/26/23 1127   · HYDROmorphone (Dilaudid) injection 0.5 mg 0.5 mg Intravenous Q8HRS PRN Rod Valencia M.D. 0.5 mg at 10/25/23 2204   · [Held by provider] heparin injection 5,000 Units 5,000 Units Subcutaneous Q8HRS Leonel Rodriguez M.D. 5,000 Units at 10/26/23 0602   · Pharmacy Consult Request ...Pain Management Review 1 Each 1 Each Other PHARMACY TO DOSE Leonel Rodriguez M.D.   · acetaminophen (Tylenol) tablet 650 mg 650 mg Oral Q6HRS PRN Leonel Rodriguez M.D.   · ondansetron (Zofran) syringe/vial injection 4 mg 4 mg Intravenous Q4HRS PRN Leonel Rodriguez M.D. 4 mg at 10/25/23 1842   · levalbuterol (Xopenex) 0.63 MG/3ML nebulizer solution 0.63 mg 3 mL Inhalation Q4HRS PRN Leonel Rodriguez M.D.   · metoprolol tartrate (Lopressor) tablet 50 mg 50 mg Oral BID Leonel Rodriguez M.D.       Assessment:   This is a 68-year-old female with past history per chart review of symptoms consistent with kely, unspecified anxiety and OCPD hospitalized for lactic acidosis.  During this hospitalization the patient met criteria for acute kely as she was grandiose, hyperverbal, had pressured speech, poor sleep, irritability, distractibility and had mood lability present. She was started on Depakote to address aforementioned symptoms for mood disorder. Patient also showcased cognitive impairment through a 16/30 on her MOCA administered 10/23. She has a history of distrust toward other physicians regarding her GI issues and Psych issues aside from Dr. Davalos. Patient agreeable to workup today for cognitive deficits/behavioral disturbance. Neurology following to rule out autoimmune encephalitis    Patient is cooperative with continuing treatment, but refuses to take PO oral meds due to burning in her throat. She reports she will only take IV medications for now and will stay in the hospital until she can take oral medications. She is now willing to wait in the hospital for as long as she needs to get her lumbar  puncture completed and results interpreted. Patient was agreeable to an increase in her depakote.     1. Manic disorder (unspecified, rule out medical etiology)     Medical :   See medical note       Plan:   Legal hold: discontinued 10/25/23  · Increase Depakote to 500mg IV BID. Recommendation is to return to PO once patient agrees to take them PO.     Counseled patient and spouse about recommendations that patient NOT resume driving until she has a driving abilities reexamined at the [employer]. They agree to this plan. Report will be sent to the [facility] at discharge    · Will continue to follow.     Thank you for the consult.

## 2023-10-26 NOTE — CARE PLAN
The patient is Stable - Low risk of patient condition declining or worsening    Shift Goals  Clinical Goals: Patient will remain free of injury  Patient Goals: Pain management  Family Goals: madina    Progress made toward(s) clinical / shift goals:     Patient is not progressing towards the following goals:    Patient received A&Ox3. 1:1 sitter in place. Assessed and medicated for pain per MAR orders. Needs addressed at this time.

## 2023-10-26 NOTE — CARE PLAN
The patient is Stable - Low risk of patient condition declining or worsening    Shift Goals  Clinical Goals: discharge  Patient Goals: discharge  Family Goals: discharge      Patient is not progressing towards the following goals:      Problem: Psychosocial  Goal: Patient's level of anxiety will decrease  Description: Target End Date:  1-3 days or as soon as patient condition allows    1.  Collaborate with patient and family/caregiver to identify triggers and develop strategies to cope with anxiety  2.  Implement stimuli reduction, calming techniques  3.  Pharmacologic management per provider order  4.  Encourage patient/family/care giver participation  5.  Collaborate with interdisciplinary team including Psychologist or Behavioral Health Team as needed  Outcome: Not Progressing

## 2023-10-26 NOTE — DISCHARGE PLANNING
Received Choice form at [id number]  Agency/Facility Name: [facility]  Referral sent per Choice form @ 5540     PASRR number (if applicable):   LOC number (if applicable):

## 2023-10-26 NOTE — DISCHARGE PLANNING
Received Stipulation and Order from the court releasing pt from legal hold, scanned copy into pt's chart.     Removed pt from legal hold as of 10/26 at 1108.    Updated Charge CAMERON Larkin, bedside CAMERON Lamb, Dr. Eason, and CAMERON Rucker.

## 2023-10-26 NOTE — DIETARY
Nutrition Services: Update   Day 2 of admit.  Jana Overton is a 68 y.o. female with admitting DX of Lactic acid acidosis [E87.20]  Abdominal pain     Problem: Nutritional:  Goal: Achieve adequate nutritional intake  Description: Patient will consume >50% of meals  Outcome: Met    Pt is currently on cardiac diet with magic cups PRN. Pt consuming >50% for meals documented since at least 10/21. Pt likely meeting nutrition needs at this time.     Malnutrition Risk: No new risk identified    Recommendations/Plan:  Encourage intake of meals  Document intake of all meals as % taken in ADL's to provide interdisciplinary communication across all shifts.   Monitor weight.  Nutrition rep will continue to see patient for ongoing meal and snack preferences.    RD to monitor weekly per department policy

## 2023-10-26 NOTE — PROGRESS NOTES
Neurology Progress Note  Neurohospitalist Service, [facility]    Referring Physician: Adilson Newby M.D.    Chief Complaint   Patient presents with    Urinary Retention     Since 3 days associated with generalized body pain and abdominal pain  She has milton catheter in place this morning ag[facility] urologist office, but no urine output since then    Alert and Oriented x 4  Using cane for ambulation       HPI: Refer to initial documented Neurology H&P, as detailed in the patient's chart.    Interval History   10/23: Consult completed.  10/24: MRI brain yesterday showing dural enhancement. EEG and further work-up ordered. Recommend LP if patient agreeable.  10/26: EEG normal 10/24. Legal hold lifted 10/25. LP discussed with patient at bedside and  on phone; agreeable to LP and continued inpatient stay if indicated for IV steroids and IVIG.    Review of systems: In addition to what is detailed in the HPI and/or updated in the interval history, all other systems reviewed and are negative.    Past Medical History:    has a past medical history of Anesthesia, Anxiety, Arthritis, ASTHMA, Atrial fib/flut, Backpain, Bronchitis, Chronic pain, Colostomy in place ([facility]), COPD (chronic obstructive pulmonary disease) ([facility]), Crohn's disease of colon (HCC), Dyspnea, History of cardiac murmur, Ileostomy present ([facility]), Infectious disease, Multiple falls, Narcotic dependence (HCC), Obstruction, Pain, Pneumonia, Postherpetic neuralgia, Rosacea, and Sleep apnea.    She has no past medical history of CAD (coronary artery disease), Liver disease, or Seizure disorder ([facility]).    FHx:  family history includes Cancer (age of onset: 40) in her maternal aunt; Diabetes in her father and sister; Heart Disease in her father; Lung Disease in her mother.    SHx:   reports that she has never smoked. She has never used smokeless tobacco. She reports that she does not currently use alcohol after a past usage of about 0.6 oz of  alcohol per week. She reports current drug use. Drugs: Marijuana and Inhaled.    Medications:    Current Facility-Administered Medications:     valproate (Depacon) 500 mg in dextrose 5% 50 mL IVPB, 500 mg, Intravenous, BID, Rj Eason M.D.    oxyCODONE (Roxicodone) oral solution 5 mg, 5 mg, Oral, Q4HRS PRN **OR** oxyCODONE (Roxicodone) oral solution 10 mg, 10 mg, Oral, Q4HRS PRN, Adilson Newby M.D.    hydroxychloroquine oral suspension 25 mg/mL in oral syringe 300 mg, 300 mg, Oral, DAILY, Adilson Newby M.D.    SUMAtriptan (Imitrex) tablet 100 mg, 100 mg, Oral, Q2HRS PRN, MARCO Fitch    diazePAM (Valium) injection 5 mg, 5 mg, Intravenous, Q8HRS PRN, Rod Valencia M.D., 5 mg at 10/26/23 0049    [DISCONTINUED] oxyCODONE immediate-release (Roxicodone) tablet 5 mg, 5 mg, Oral, Q3HRS PRN **OR** [DISCONTINUED] oxyCODONE immediate release (Roxicodone) tablet 10 mg, 10 mg, Oral, Q3HRS PRN **OR** HYDROmorphone (Dilaudid) injection 0.5 mg, 0.5 mg, Intravenous, Q8HRS PRN, Rod Valencia M.D., 0.5 mg at 10/25/23 2204    [Held by provider] heparin injection 5,000 Units, 5,000 Units, Subcutaneous, Q8HRS, Leonel Rodriguez M.D., 5,000 Units at 10/26/23 0602    Pharmacy Consult Request ...Pain Management Review 1 Each, 1 Each, Other, PHARMACY TO DOSE, Leonel Rodriguez M.D.    [] acetaminophen (Tylenol) tablet 650 mg, 650 mg, Oral, Q6HRS, 650 mg at 10/13/23 0613 **FOLLOWED BY** acetaminophen (Tylenol) tablet 650 mg, 650 mg, Oral, Q6HRS PRN, Leonel Rodriguez M.D.    ondansetron (Zofran) syringe/vial injection 4 mg, 4 mg, Intravenous, Q4HRS PRN, Leonel Rodriguez M.D., 4 mg at 10/25/23 1842    levalbuterol (Xopenex) 0.63 MG/3ML nebulizer solution 0.63 mg, 3 mL, Inhalation, Q4HRS PRN, Leonel Rodriguez M.D.    metoprolol tartrate (Lopressor) tablet 50 mg, 50 mg, Oral, BID, Leonel Rodriguez M.D.    Physical Examination:     Vitals:    10/25/23 1823 10/25/23 1957 10/26/23 0600 10/26/23 0820    BP: 126/46 133/64 129/56 125/60   Pulse: (!) 106 76 74 81   Resp: 17 18 17 17   Temp: 36.6 °C (97.9 °F) 36.4 °C (97.5 °F) 36.3 °C (97.4 °F) 36.1 °C (96.9 °F)   TempSrc: Temporal Temporal Temporal Temporal   SpO2: 98% 95% 94% 97%   Weight:       Height:           General: Patient is awake and in no acute distress  Eyes: examination of optic disks not indicated at this time  CV: RRR    NEUROLOGICAL EXAM:     Mental status: Awake, alert and fully oriented, follows commands  Speech and language: speech is clear and fluent. The patient is able to name and repeat.  Cranial nerve exam: Pupils are equal, round and reactive to light bilaterally. Visual fields are full. Extraocular muscles are intact. Sensation in the face is intact to light touch. Face is symmetric. Hearing to finger rub equal. Palate elevates symmetrically. Shoulder shrug is full. Tongue is midline.  Motor exam: Strength is 5/5 in all extremities both distally and proximally. Tone is normal. No abnormal movements were seen on exam.  Sensory exam: No sensory deficits identified   Deep tendon reflexes:  2+ and symmetric.   Pronator drift negative.  Romberg POSITIVE  Toes down-going bilaterally.  Coordination: no ataxia   Gait: normal    Objective Data:    Labs:  Lab Results   Component Value Date/Time    PROTHROMBTM 14.9 (H) 10/24/2022 01:19 AM    INR 1.19 (H) 10/24/2022 01:19 AM      Lab Results   Component Value Date/Time    WBC 6.0 10/25/2023 01:47 PM    WBC 7.9 02/10/2010 04:04 PM    RBC 4.57 10/25/2023 01:47 PM    RBC 4.86 02/10/2010 04:04 PM    HEMOGLOBIN 14.0 10/25/2023 01:47 PM    HEMATOCRIT 42.7 10/25/2023 01:47 PM    MCV 93.4 10/25/2023 01:47 PM    MCV 86 02/10/2010 04:04 PM    MCH 30.6 10/25/2023 01:47 PM    MCH 27.8 02/10/2010 04:04 PM    MCHC 32.8 10/25/2023 01:47 PM    MPV 10.4 10/25/2023 01:47 PM    NEUTSPOLYS 66.40 10/25/2023 01:47 PM    LYMPHOCYTES 22.20 10/25/2023 01:47 PM    MONOCYTES 9.80 10/25/2023 01:47 PM    EOSINOPHILS 0.70  10/25/2023 01:47 PM    EOSINOPHILS 34 01/28/2021 02:40 PM    BASOPHILS 0.70 10/25/2023 01:47 PM    HYPOCHROMIA 1+ 03/15/2014 10:02 AM      Lab Results   Component Value Date/Time    SODIUM 141 10/26/2023 06:16 AM    POTASSIUM 4.2 10/26/2023 06:16 AM    CHLORIDE 104 10/26/2023 06:16 AM    CO2 26 10/26/2023 06:16 AM    GLUCOSE 79 10/26/2023 06:16 AM    BUN 19 10/26/2023 06:16 AM    CREATININE 0.98 10/26/2023 06:16 AM    CREATININE 0.89 02/10/2010 04:04 PM    BUNCREATRAT 17 02/10/2010 04:04 PM    GLOMRATE >59 02/10/2010 04:04 PM      Lab Results   Component Value Date/Time    CHOLSTRLTOT 194 05/23/2022 02:36 AM    LDL 90 05/23/2022 02:36 AM    HDL 81 05/23/2022 02:36 AM    TRIGLYCERIDE 113 05/23/2022 02:36 AM       Lab Results   Component Value Date/Time    ALKPHOSPHAT 74 10/26/2023 06:16 AM    ASTSGOT 41 10/26/2023 06:16 AM    ALTSGPT 33 10/26/2023 06:16 AM    TBILIRUBIN 0.4 10/26/2023 06:16 AM        Imaging/Testing:  -MRI brain w/wo 10/23/23 without acute abnormality, showing mildly prominent dural enhancement throughout the brain  -EEG 10/24/23 neg    Assessment and Plan:    Patient is a 69yo female with PMH of Crohn's disease s/p ileostomy, HTN, and chronic pain that is under legal hold after evaluation by psychiatry for concern of kely, and ruling out alternative causes of kely. Currently on depakote 250mg qAM/500mg qPM. NMDA receptor encephalitis unlikely but cannot be ruled out. Pending LP today; will treat with IV steroids and IVIG if any sign of abnormality on initial CSF studies.    Plan:  -Pending serum autoimmune encephalitis and paraneoplastic antibody extended panels  -Pending VAHID, anti-NGUYỄN, copper  -RF and RPR neg  -B12, TSH normal  -Given MRI findings, pending LP today, with CSF studies including cell count, protein, glucose, meningitis/encephalitis, AFB/TB, cryptococcal antigen, encephalopathy autoimmune eval, paraneoplastic autoantibody eval, treponema pallidum Ab  -Will treat empirically for  autoimmune/paraneoplastic encephalitis with IV steroids and IVIG if any sign of abnormality on initial CSF studies  -Neurology will follow    John Yan MD  [employer] IM PGY-3    Plan discussed with my attending, Dr. Moore.

## 2023-10-27 ENCOUNTER — APPOINTMENT (OUTPATIENT)
Dept: RADIOLOGY | Facility: MEDICAL CENTER | Age: 68
DRG: 699 | End: 2023-10-27
Attending: INTERNAL MEDICINE
Payer: MEDICARE

## 2023-10-27 LAB
ALBUMIN SERPL BCP-MCNC: 4.3 G/DL (ref 3.2–4.9)
BUN SERPL-MCNC: 21 MG/DL (ref 8–22)
CALCIUM ALBUM COR SERPL-MCNC: 10.5 MG/DL (ref 8.5–10.5)
CALCIUM SERPL-MCNC: 10.7 MG/DL (ref 8.5–10.5)
CHLORIDE SERPL-SCNC: 103 MMOL/L (ref 96–112)
CO2 SERPL-SCNC: 23 MMOL/L (ref 20–33)
CREAT SERPL-MCNC: 0.89 MG/DL (ref 0.5–1.4)
GFR SERPLBLD CREATININE-BSD FMLA CKD-EPI: 70 ML/MIN/1.73 M 2
GLUCOSE SERPL-MCNC: 104 MG/DL (ref 65–99)
MAGNESIUM SERPL-MCNC: 1.8 MG/DL (ref 1.5–2.5)
NUCLEAR IGG SER QL IA: DETECTED
PHOSPHATE SERPL-MCNC: 2.9 MG/DL (ref 2.5–4.5)
POTASSIUM SERPL-SCNC: 4.9 MMOL/L (ref 3.6–5.5)
SODIUM SERPL-SCNC: 139 MMOL/L (ref 135–145)

## 2023-10-27 PROCEDURE — 83735 ASSAY OF MAGNESIUM: CPT

## 2023-10-27 PROCEDURE — 770001 HCHG ROOM/CARE - MED/SURG/GYN PRIV*

## 2023-10-27 PROCEDURE — 700111 HCHG RX REV CODE 636 W/ 250 OVERRIDE (IP): Performed by: INTERNAL MEDICINE

## 2023-10-27 PROCEDURE — 700105 HCHG RX REV CODE 258: Performed by: INTERNAL MEDICINE

## 2023-10-27 PROCEDURE — C9113 INJ PANTOPRAZOLE SODIUM, VIA: HCPCS | Performed by: INTERNAL MEDICINE

## 2023-10-27 PROCEDURE — A9270 NON-COVERED ITEM OR SERVICE: HCPCS

## 2023-10-27 PROCEDURE — 700102 HCHG RX REV CODE 250 W/ 637 OVERRIDE(OP): Performed by: INTERNAL MEDICINE

## 2023-10-27 PROCEDURE — 99231 SBSQ HOSP IP/OBS SF/LOW 25: CPT | Mod: GC | Performed by: STUDENT IN AN ORGANIZED HEALTH CARE EDUCATION/TRAINING PROGRAM

## 2023-10-27 PROCEDURE — 80069 RENAL FUNCTION PANEL: CPT

## 2023-10-27 PROCEDURE — 700102 HCHG RX REV CODE 250 W/ 637 OVERRIDE(OP)

## 2023-10-27 PROCEDURE — 700111 HCHG RX REV CODE 636 W/ 250 OVERRIDE (IP): Mod: JZ | Performed by: STUDENT IN AN ORGANIZED HEALTH CARE EDUCATION/TRAINING PROGRAM

## 2023-10-27 PROCEDURE — 99232 SBSQ HOSP IP/OBS MODERATE 35: CPT | Performed by: PSYCHIATRY & NEUROLOGY

## 2023-10-27 PROCEDURE — A9270 NON-COVERED ITEM OR SERVICE: HCPCS | Performed by: INTERNAL MEDICINE

## 2023-10-27 PROCEDURE — 51798 US URINE CAPACITY MEASURE: CPT

## 2023-10-27 PROCEDURE — 700111 HCHG RX REV CODE 636 W/ 250 OVERRIDE (IP): Mod: JZ | Performed by: HOSPITALIST

## 2023-10-27 PROCEDURE — 71045 X-RAY EXAM CHEST 1 VIEW: CPT

## 2023-10-27 PROCEDURE — 99232 SBSQ HOSP IP/OBS MODERATE 35: CPT | Performed by: INTERNAL MEDICINE

## 2023-10-27 RX ORDER — PANTOPRAZOLE SODIUM 40 MG/10ML
40 INJECTION, POWDER, LYOPHILIZED, FOR SOLUTION INTRAVENOUS DAILY
Status: DISCONTINUED | OUTPATIENT
Start: 2023-10-27 | End: 2023-10-28 | Stop reason: HOSPADM

## 2023-10-27 RX ORDER — KETOROLAC TROMETHAMINE 30 MG/ML
15 INJECTION, SOLUTION INTRAMUSCULAR; INTRAVENOUS EVERY 6 HOURS PRN
Status: DISCONTINUED | OUTPATIENT
Start: 2023-10-27 | End: 2023-10-28 | Stop reason: HOSPADM

## 2023-10-27 RX ORDER — METOCLOPRAMIDE HYDROCHLORIDE 5 MG/ML
10 INJECTION INTRAMUSCULAR; INTRAVENOUS EVERY 6 HOURS PRN
Status: DISCONTINUED | OUTPATIENT
Start: 2023-10-27 | End: 2023-10-28 | Stop reason: HOSPADM

## 2023-10-27 RX ORDER — FUROSEMIDE 10 MG/ML
40 INJECTION INTRAMUSCULAR; INTRAVENOUS ONCE
Status: COMPLETED | OUTPATIENT
Start: 2023-10-27 | End: 2023-10-27

## 2023-10-27 RX ADMIN — HYDROMORPHONE HYDROCHLORIDE 0.5 MG: 1 INJECTION, SOLUTION INTRAMUSCULAR; INTRAVENOUS; SUBCUTANEOUS at 02:31

## 2023-10-27 RX ADMIN — ONDANSETRON 4 MG: 2 INJECTION INTRAMUSCULAR; INTRAVENOUS at 14:44

## 2023-10-27 RX ADMIN — METOCLOPRAMIDE 10 MG: 5 INJECTION, SOLUTION INTRAMUSCULAR; INTRAVENOUS at 17:23

## 2023-10-27 RX ADMIN — HYDROMORPHONE HYDROCHLORIDE 0.5 MG: 1 INJECTION, SOLUTION INTRAMUSCULAR; INTRAVENOUS; SUBCUTANEOUS at 11:34

## 2023-10-27 RX ADMIN — VALPROATE SODIUM 500 MG: 100 INJECTION, SOLUTION INTRAVENOUS at 16:39

## 2023-10-27 RX ADMIN — ONDANSETRON 4 MG: 2 INJECTION INTRAMUSCULAR; INTRAVENOUS at 05:41

## 2023-10-27 RX ADMIN — PANTOPRAZOLE SODIUM 40 MG: 40 INJECTION, POWDER, FOR SOLUTION INTRAVENOUS at 09:14

## 2023-10-27 RX ADMIN — DIAZEPAM 5 MG: 5 INJECTION, SOLUTION INTRAMUSCULAR; INTRAVENOUS at 14:43

## 2023-10-27 RX ADMIN — SUMATRIPTAN SUCCINATE 100 MG: 50 TABLET ORAL at 09:15

## 2023-10-27 RX ADMIN — DIAZEPAM 5 MG: 5 INJECTION, SOLUTION INTRAMUSCULAR; INTRAVENOUS at 00:06

## 2023-10-27 RX ADMIN — KETOROLAC TROMETHAMINE 15 MG: 30 INJECTION, SOLUTION INTRAMUSCULAR; INTRAVENOUS at 18:25

## 2023-10-27 RX ADMIN — VALPROATE SODIUM 500 MG: 100 INJECTION, SOLUTION INTRAVENOUS at 05:46

## 2023-10-27 RX ADMIN — FUROSEMIDE 40 MG: 10 INJECTION, SOLUTION INTRAVENOUS at 09:14

## 2023-10-27 ASSESSMENT — ENCOUNTER SYMPTOMS
SHORTNESS OF BREATH: 0
VOMITING: 1
ABDOMINAL PAIN: 1
NERVOUS/ANXIOUS: 1
NAUSEA: 1
BACK PAIN: 1
FOCAL WEAKNESS: 0

## 2023-10-27 ASSESSMENT — PAIN DESCRIPTION - PAIN TYPE
TYPE: ACUTE PAIN

## 2023-10-27 NOTE — PROGRESS NOTES
Referring Physician: Adilson Newby M.D.    S:  No acute events. Patient reports that she continues to have nausea and vomiting.  No new neurologic symptoms.  Lumbar puncture for CSF analysis was completed yesterday.    O:    Mental status: Awake and alert.  Attention is intact.  Answers questions.  Verbose at times tangential however redirectable.  Language: Fluent with intact comprehension.  No dysarthria.  Cranial nerves:    Good eye contact.  Visually tracks examiner.   Face appears symmetric   Hearing is intact to conversation  Motor: Moves all extremities symmetrically.  No abnormal movements or postures.   Reflexes: Not tested.   Sensory: Not tested.   Coordination: No obvious dysmetria.  Gait: Not tested.      MENINGITIS/ENCEPHALITIS CSF PANEL BY PCR   Collected: 10/26/23 1200  Final result     Cryptococcus neoformans/gattii by PCR Not Detected Human Herpesvirus 6 by PCR Not Detected   Cytomegalovirus by PCR Not Detected Human parechovirus by PCR Not Detected   Enterovirus by PCR Not Detected Listeria Monocytogenes by PCR Not Detected   Escherichia coli K1 by PCR Not Detected Neisseria meningitidis by PCR Not Detected   HAEM influenzae by PCR Not Detected Strep Agalactiae by PCR Not Detected   HSV 1 by PCR Not Detected Strep pneumoniae by PCR Not Detected   HSV 2 by PCR Not Detected Varicella Zoster Virus by PCR Not Detected          Latest Reference Range & Units 10/26/23 12:00   Fluid Type     Number Of Tubes  3   Volume mL 5.0   Color-Body Fluid  Pink   Character-Body Fluid  Hazy   Supernatant Appearance  Xanthochromic   Total WBC cells/uL    CSF Total Nucleated Cells 0 - 10 cells/uL 4   Total RBC Count cells/uL 3000   Crenated RBC % 0   Polys %    Lymphs %    Mononuclear Cells - Fluid %    Eosinophils - CSF %    CSF Tube Number  Tube 3   Comments  See Comment   Glucose CSF 40 - 80 mg/dL 55   Total Protein, CSF 15 - 45 mg/dL 42   TNC count within normal range, differential count is not indicated.    Copper  116  VAHID detected since 2009    No interval neuroimaging or neurodiagnostic study.     MRI BRAIN IMPRESSION (10/23/2023):     No acute intracranial process.     Mildly prominent dural enhancement noted throughout the brain.    EEG INTERPRETATION (10/24/2023):     This is a normal video EEG recording in the awake, drowsy and sleep states.   Note: A normal EEG does not rule out epilepsy.  If the clinical suspicion remains high for seizures, a prolonged recording to capture clinical or subclinical events may be helpful.    Impression & Recommendations: Neurology consulted for alternative causes of kely.  Currently on Depakote 500 mg twice daily.  Aguirre initial CSF profile. Negative CSF meningitis/encephalitis PCR panel. Paraneoplastic/autoimmune extended antibody panels are pending.  Of note this panel is a send out that can take 1 to 2 weeks to result.  No clear indication to initiate immunotherapy at this time.  Strongly advise close follow-up with the patient's primary outpatient neurologist.  No additional inpatient neurology work-up is necessary at this time.    Neurology will sign off.  Please reach out with any questions/concerns.    Provided a total of 37 minutes of acute neurologic care for this patient encounter reviewing medical records, diagnostic studies, direct face-to-face time with the patient, documentation, and communicating plan of care.      Jan Moore MD  Neurohospitalist, Acute Care Services          Please note that this dictation was created using voice recognition software.  I have made every reasonable attempt to correct obvious errors, but I expect that there are errors of grammar and possibly content that I did not discover before finalizing the note.

## 2023-10-27 NOTE — CARE PLAN
The patient is Stable - Low risk of patient condition declining or worsening    Shift Goals  Clinical Goals: Pain management  Patient Goals: Rest  Family Goals: discharge    Progress made toward(s) clinical / shift goals:      Patient is not progressing towards the following goals:    Patient received A&Ox3. Assessed and medicated for pain per MAR orders. Safety interventions in place.  Problem: Pain - Standard  Goal: Alleviation of pain or a reduction in pain to the patient’s comfort goal  Outcome: Not Progressing     Problem: Psychosocial  Goal: Patient's level of anxiety will decrease  Outcome: Not Progressing

## 2023-10-27 NOTE — PROGRESS NOTES
[facility] Medicine Daily Progress Note    Date of Service  10/27/2023    Chief Complaint  Jana Overton is a 68 y.o. female admitted 10/12/2023 with abdominal pain, nausea    Hospital Course  68 y.o. female who presented 10/12/2023  with urinary retention, nausea.  Patient has a history of Crohn's disease status post colectomy with ileostomy on left side, hypertension, chronic pain.  She has had 2 recent emergency room visits due to worsening of chronic abdominal pain and difficulty tolerating p.o. intake with failure of p.o. Zofran at home.  On her last ED visit she had a borderline FLORES with creatinine increased to 1.5 from baseline of 1.2.  She was discharged home after some IV fluids.  She presents again due to concern for decreased urinary output.  She had a Sage placed at urology office for unclear reason however did not drain much urine.  Kidney function stable from last week however did have elevated lactic acid to 3.9.  After some IV fluids she is making some dark urine.  Due to concern for elevated lactic acid and inability to tolerate p.o. intake admission was requested.  CT abdomen unremarkable  Lactic acid 3.9 on admission, which has been resolved  ESR, CRP negative    Interval Problem Update  10/26 Legal hold has been lifted, remove sitter  Patient says she wants to stay hospitalized to keep working with psychiatry and complete her work-up.  She is agreeable to increasing her Depakote dose and changing to IV as suggested by psychiatry.  She also agrees to a lumbar puncture today, completed.  She says she still has a hard time eating or drinking anything    10/27 Patient reports a burning epigastric pain along with nausea. She did not tolerate depakote oral solution therefore has been switched to IV. LP was obtained for further evaluation for paraneoplastic encephalopathy. She endorses some shortness of breath and b/ lower extremity edema. Ordered IV lasix and CXR    I have discussed this patient's  plan of care and discharge plan at IDT rounds today with Case Management, Nursing, Nursing leadership, and other members of the IDT team.    Consultants/Specialty  GI and psychiatry    Code Status  Full Code    Disposition  The patient is not medically cleared for discharge to home or a post-acute facility.      I have placed the appropriate orders for post-discharge needs.    Review of Systems  Review of Systems   Constitutional:  Positive for malaise/fatigue.   Respiratory:  Negative for shortness of breath.    Cardiovascular:  Negative for chest pain.   Gastrointestinal:  Positive for abdominal pain, nausea and vomiting.   Neurological:  Negative for focal weakness.   Psychiatric/Behavioral:  The patient is nervous/anxious.         Physical Exam  Temp:  [36 °C (96.8 °F)-36.4 °C (97.6 °F)] 36.1 °C (96.9 °F)  Pulse:  [84-99] 96  Resp:  [17-18] 18  BP: (119-154)/(54-74) 124/64  SpO2:  [95 %-100 %] 100 %    Physical Exam  Vitals and nursing note reviewed.   Constitutional:       General: She is not in acute distress.     Appearance: She is not toxic-appearing.      Comments: thin   HENT:      Head: Normocephalic.      Mouth/Throat:      Mouth: Mucous membranes are moist.   Eyes:      General:         Right eye: No discharge.         Left eye: No discharge.   Cardiovascular:      Rate and Rhythm: Normal rate and regular rhythm.   Pulmonary:      Effort: Pulmonary effort is normal. No respiratory distress.      Breath sounds: No wheezing or rales.   Abdominal:      Palpations: Abdomen is soft.      Tenderness: There is no abdominal tenderness.      Comments: Ostomy noted   Musculoskeletal:         General: No swelling.      Cervical back: Neck supple.   Skin:     General: Skin is warm and dry.   Neurological:      Mental Status: She is alert and oriented to person, place, and time.         Fluids    Intake/Output Summary (Last 24 hours) at 10/27/2023 0888  Last data filed at 10/27/2023 0563  Gross per 24 hour   Intake  --   Output 900 ml   Net -900 ml         Laboratory  Recent Labs     10/25/23  1347   WBC 6.0   RBC 4.57   HEMOGLOBIN 14.0   HEMATOCRIT 42.7   MCV 93.4   MCH 30.6   MCHC 32.8   RDW 45.0   PLATELETCT 192   MPV 10.4       Recent Labs     10/25/23  1347 10/26/23  0616 10/27/23  0617   SODIUM 141 141 139   POTASSIUM 4.2 4.2 4.9   CHLORIDE 105 104 103   CO2 21 26 23   GLUCOSE 104* 79 104*   BUN 19 19 21   CREATININE 1.17 0.98 0.89   CALCIUM 10.7* 10.3 10.7*                     Imaging  MR-BRAIN-WITH & W/O   Final Result         No acute intracranial process.      Mildly prominent dural enhancement noted throughout the brain. Has the patient had a recent recent lumbar puncture?.      DX-SKULL-LIMITED 3-   Final Result      No metallic foreign bodies or devices are identified.      DX-CHEST-LIMITED (1 VIEW)   Final Result      1.  No acute cardiopulmonary abnormality identified.      2.  No contraindicated device      3.  Right midlung zone granuloma      IR-US GUIDED PIV   Final Result    Ultrasound-guided PERIPHERAL IV INSERTION performed by    qualified nursing staff as above.      IR-US GUIDED PIV   Final Result    Ultrasound-guided PERIPHERAL IV INSERTION performed by    qualified nursing staff as above.      IR-US GUIDED PIV   Final Result    Ultrasound-guided PERIPHERAL IV INSERTION performed by    qualified nursing staff as above.      DX-ESOPHAGUS BARIUM SWALLOW SINGLE CONTRAST   Final Result      1.  Distal esophageal lumen is narrowed down to less than 5 mm, likely representing a distal esophageal stricture. It does not widen during the examination.   2.  Several areas of minimal luminal narrowing of the proximal esophagus, down to 10 mm.   3.  Moderate mid and distal esophageal dysmotility.   4.  Gastroesophageal reflux.      CT-ABDOMEN-PELVIS WITH   Final Result      Prior colectomy with LEFT lower quadrant ileostomy   Catheter in place in the urinary bladder. The tip abuts the bladder dome.          DX-CHEST-PORTABLE (1 VIEW)    (Results Pending)        Assessment/Plan  * Lactic acid acidosis- (present on admission)  Assessment & Plan  3.9 on presentation  In setting of dehydration, nausea/vomiting  IV fluid resuscitation  Trend lactic acid, have ordered repeat  Patient received IV fluid, lactic acidosis resolved  Monitoring.     Urinary retention- (present on admission)  Assessment & Plan  Sage cath placed last night  Patient is allergic to sulfas can't take flomax  Will need to f/u with urologist as outpatient.     Achalasia- (present on admission)  Assessment & Plan  Grade II  Per GI patient needs f/u as outpatient at tertiary center  Barium swallow eval showed:   1. Distal esophageal lumen is narrowed down to less than 5 mm, likely representing a distal esophageal stricture. It does not widen during the examination.  2.  Several areas of minimal luminal narrowing of the proximal esophagus, down to 10 mm.  3.  Moderate mid and distal esophageal dysmotility.  4.  Gastroesophageal reflux.    Per GI no plan for intervention at this time since patient appears to be at baseline and patient was able to tolerate barium for study w/o problems. No additional benefit from further EGD at this point. The patient will need help from the primary doctor to see a specialist either in Tertiary center in [place] or [place] for possible myotomy for her type II achalasia.    Patient to continue on working on her oral intake    Abnormal TSH  Assessment & Plan  Normal t3 and t4.   borderline low tsh.       Delirium  Assessment & Plan  hyperverbal and tangential. Unable to answer questions appropriately. Need frequent redirection. Unclear etiology, due to Valium?  I have consulted psych, will decrease valium to 5mg iv bid prn  Check B12, B1, TSH  Appreciated psych recommendations.     Discussed with psych team.  F/u t3, t4 is normal.   depakote iv qhs per psych.   Discussed with psych they are recommending MRI brain, MRI brain  negative for acute process.   EEG negative  Lumbar puncture was done today.  Psychiatry recommended Depakote 500 mg twice daily IV, ordered    Hypophosphatemia  Assessment & Plan  Replaced as indicated with iv kphos  Replacing with iv phos.     Abdominal pain  Assessment & Plan  Diffuse abdominal pain, nausea and vomiting in the setting of Crohn disease, status post colectomy  CT abdomen unremarkable for acute pathology.  ESR/CRP negative.  Denies bloody stool  Unclear etiology  Refused to take p.o. medications and diet. Requested iv dilaudid   GI rec caloproectin and speech evaluation, which are ordered  Multimodal pain managements including po and iv narcotics prn. Monitoring respiratory status and sedation score    1015: Patient reports abdominal pain, nausea and vomiting  Refuse to take po meds  She is able to take some of po diet  Pending esophagram   10/16: She reports significant abdominal pain, nausea and vomiting. Refused to take po meds  Esophagram reviewed  I have discussed with GI  Minimize narcotics use.     Continue c/o abdominal pain. Discussed with GI, they are recommending f/u as outpatient with tertiary center.   C/o abdominal pain but she is ambulating w/o any signs of pain or distress. Ostomy working.     Monitoring.     Hypomagnesemia- (present on admission)  Assessment & Plan  Replaced.       Essential hypertension- (present on admission)  Assessment & Plan  Continue metoprolol, holding diuretics in setting of dehydration  Monitoring.     Tachycardia  Assessment & Plan  Has been persistently in 110's  In setting of dehydration  Have ordered twelve-lead EKG to further assess  Monitoring.   Stable.       Hypokalemia  Assessment & Plan  Refused po supplements  I have ordered iv kcl replacement  Monitoring.   F/u labs in am,     Dehydration  Assessment & Plan  With decreased urine output as well as decreased ostomy output  In setting of nausea and vomiting  IV fluids, monitor BMP and urine output,  has started to make dark urine after initial IV fluid resuscitation  Antiemetics    IVF prn  Encouraged oral intake  As per GI patient was able to drink contrast for barium eval w/o problems.       Crohn's disease of small intestine with complication (HCC)- (present on admission)  Assessment & Plan  Has ostomy in place.  Follows with Dr. Davalos per patient  Recently completed Medrol Dosepak, will hold off on further steroids as unclear that there is current Crohn's flare given no ostomy output or findings of inflammation on CT abdomen  Check inflammatory markers ESR, CRP negative  No bloody bowel movement or obstruction, chance of acute Crohn small bowel flare is not high.   Ordered calprotectin fecal, patient refusing stool analysis.   Consulted GI, discussed with GI  calprotectin pending.   Discussed with GI today, no intervention planned.     calprotectin low.            VTE prophylaxis: SCD and heparin    I have performed a physical exam and reviewed and updated ROS and Plan today (10/27/2023). In review of yesterday's note (10/26/2023), there are no changes except as documented above.

## 2023-10-27 NOTE — CONSULTS
"PSYCHIATRIC FOLLOW-UP:(established)  *Reason for admission: Nausea, vomiting, abdominal pain, poor PO intake       *Legal Hold Status: discontinued by court        Chart reviewed.         *HPI: Patient was interviewed at bedside this morning while laying in bed. She was calm, mostly cooperative. Patient presents with flight of ideas but is redirectable, she is perseverative on reasons why she is dissatisfied with care in the hospital but still wishes to stay until her \"my throat heals up so I can take meds\". She states she tried to take meds this morning but she felt the medicine get stuck and her throat was burning so she is now again refusing to take any oral medications.    She reports that she had episodes of emesis this morning and cannot take PO medication currently. We counseled her that our plan is to continue IV Depakote for now until she feels she can take oral medications. She reports that her migraines are improving and she noticed a difference after starting medication. Counseled her that once outpatient she will require regular monitoring of depakote level, CBC and CMP as this medication can lower platelet counts and cause elevate liver enzymes. She agrees with outpatient monitoring of depakote and states she would want to continue this medication at discharge. She continues to express that she does not understand \"what I did wrong\" in regard to the legal hold. No other acute concerns or issues.Denies SI/HI. No hallucinations or delusions.    Medical ROS (as pertinent):     Review of Systems   Gastrointestinal:  Positive for vomiting.   Musculoskeletal:  Positive for back pain.     Did note endorse dizziness, lightheadedness    Vitals:    10/27/23 0711   BP: 124/64   Pulse: 96   Resp: 18   Temp: 36.1 °C (96.9 °F)   SpO2: 100%      *Psychiatric Examination:  General Appearance: 68-year-old female, appears stated age, thin  Abnormal Movements: no tics, tremors, psychomotor agitation  Gait and Posture: " "gait not assessed, patient did not want to walk, states it is too cold in the hospital  Speech: slightly increased rate, soft volume, coherent  Thought processes:  flight of ideas but redirectable  Associations: no loose associations  Abnormal or Psychotic Thoughts: no evidence of auditory or visual hallucinations  Judgement and Insight: fair insight, judgement fair  Orientation: oriented to person, time, place, situation  Recent and Remote Memory: intact  Attention Span and Concentration: intact  Language: fluent English  Fund of Knowledge: appropriate to age  Mood and Affect: mood is \"very bad\" and affect is frustrated, broad range  SI/HI: no evidence of SI or HI      *EKG:  on 10/13/23   *Imaging: MRI Brain on 10/23/23  IMPRESSION:     No acute intracranial process.     Mildly prominent dural enhancement noted throughout the brain.   EEG:    [id number]/24/23:     This is a normal video EEG recording in the awake, drowsy and sleep states.   Note: A normal EEG does not rule out epilepsy.  If the clinical suspicion remains high for seizures, a prolonged recording to capture clinical or subclinical events may be helpful.        *Labs personally reviewed:   Recent Results (from the past 24 hour(s))   CSF Cell Count    Collection Time: 10/26/23 12:00 PM   Result Value Ref Range    Number Of Tubes 3     Volume 5.0 mL    Color-Body Fluid Pink     Character-Body Fluid Hazy     Supernatant Appearance Xanthochromic     Total RBC Count 3000 cells/uL    Crenated RBC 0 %    CSF Total Nucleated Cells 4 0 - 10 cells/uL    Comments See Comment     CSF Tube Number Tube 3    CSF PROTEIN    Collection Time: 10/26/23 12:00 PM   Result Value Ref Range    Total Protein, CSF 42 15 - 45 mg/dL   CSF GLUCOSE    Collection Time: 10/26/23 12:00 PM   Result Value Ref Range    Glucose CSF 55 40 - 80 mg/dL   MENINGITIS/ENCEPHALITIS CSF PANEL BY PCR    Collection Time: 10/26/23 12:00 PM   Result Value Ref Range    Cryptococcus " neoformans/gattii by PCR Not Detected     Cytomegalovirus by PCR Not Detected     Enterovirus by PCR Not Detected     Escherichia coli K1 by PCR Not Detected     HAEM influenzae by PCR Not Detected     HSV 1 by PCR Not Detected     HSV 2 by PCR Not Detected     Human Herpesvirus 6 by PCR Not Detected     Human parechovirus by PCR Not Detected     Listeria Monocytogenes by PCR Not Detected     Neisseria meningitidis by PCR Not Detected     Strep Agalactiae by PCR Not Detected     Strep pneumoniae by PCR Not Detected     Varicella Zoster Virus by PCR Not Detected    Renal Function Panel    Collection Time: 10/27/23  6:17 AM   Result Value Ref Range    Sodium 139 135 - 145 mmol/L    Potassium 4.9 3.6 - 5.5 mmol/L    Chloride 103 96 - 112 mmol/L    Co2 23 20 - 33 mmol/L    Glucose 104 (H) 65 - 99 mg/dL    Creatinine 0.89 0.50 - 1.40 mg/dL    Bun 21 8 - 22 mg/dL    Calcium 10.7 (H) 8.5 - 10.5 mg/dL    Correct Calcium 10.5 8.5 - 10.5 mg/dL    Phosphorus 2.9 2.5 - 4.5 mg/dL    Albumin 4.3 3.2 - 4.9 g/dL   MAGNESIUM    Collection Time: 10/27/23  6:17 AM   Result Value Ref Range    Magnesium 1.8 1.5 - 2.5 mg/dL   ESTIMATED GFR    Collection Time: 10/27/23  6:17 AM   Result Value Ref Range    GFR (CKD-EPI) 70 >60 mL/min/1.73 m 2       Current medications:  Current Facility-Administered Medications   Medication Dose Route Frequency Provider Last Rate Last Admin    pantoprazole (Protonix) injection 40 mg  40 mg Intravenous DAILY Adilson Newby M.D.   40 mg at 10/27/23 0914    valproate (Depacon) 500 mg in dextrose 5% 50 mL IVPB  500 mg Intravenous BID Rj Eason M.D.   Stopped at 10/27/23 0646    oxyCODONE (Roxicodone) oral solution 5 mg  5 mg Oral Q4HRS PRN Adilson Newby M.D.        Or    oxyCODONE (Roxicodone) oral solution 10 mg  10 mg Oral Q4HRS PRN Adilson Newby M.D.        hydroxychloroquine oral suspension 25 mg/mL in oral syringe 300 mg  300 mg Oral DAILY Adilson Newby M.D.        SUMAtriptan (Imitrex) tablet 100 mg  100 mg  Oral Q2HRS PRN MARCO Fitch   100 mg at 10/27/23 0915    diazePAM (Valium) injection 5 mg  5 mg Intravenous Q8HRS PRN Rod Valencia M.D.   5 mg at 10/27/23 0006    HYDROmorphone (Dilaudid) injection 0.5 mg  0.5 mg Intravenous Q8HRS PRN Rod Valencia M.D.   0.5 mg at 10/27/23 0231    [Held by provider] heparin injection 5,000 Units  5,000 Units Subcutaneous Q8HRS Leonel Rodriguez M.D.   5,000 Units at 10/26/23 0602    Pharmacy Consult Request ...Pain Management Review 1 Each  1 Each Other PHARMACY TO DOSE Leonel Rodriguez M.D.        acetaminophen (Tylenol) tablet 650 mg  650 mg Oral Q6HRS PRN Leonel Rodriguez M.D.        ondansetron (Zofran) syringe/vial injection 4 mg  4 mg Intravenous Q4HRS PRN Leonel Rodriguez M.D.   4 mg at 10/27/23 0541    levalbuterol (Xopenex) 0.63 MG/3ML nebulizer solution 0.63 mg  3 mL Inhalation Q4HRS PRN Leonel Rodriguez M.D.        metoprolol tartrate (Lopressor) tablet 50 mg  50 mg Oral BID Leonel Rodriguez M.D.           Assessment: Jana Overton is a 68-year-old female who was admitted for poor PO intake, history of Crohn's disease s/p colectomy with ileostomy. During hospitalization she began to present with symptoms consistent with kely including hyperverbal, grandiose, pressured speech, poor sleep, mood lability. From collateral information and chart review these symptoms have been present in her history. MOCA score was 16/30 on 10/23, neurology following to rule out autoimmune encephalitis. She was started on Depakote for treatment of mood and concurrent migraines. She continues to present hyperverbal with tangential thought process but is more easily redirected. No psychotic symptoms. She continues to be agreeable to Depakote but prefers IV administration over oral intake due to reported symptom of dysphagia.      Dx: 1. Manic disorder (unspecified, rule out medical etiology)    Medical: please see medical note    Plan:  1- Legal hold:  discontinued by court on 10/25/23  2- Psychotropic medications: Continue Depakote 500mg IV BID, may change to PO solution if patient is willing to try PO depakote    3- Discussed the case with: Dr. Law, Dr. Newby (via voalte)  4- Psychiatry will follow up  Labs: Depakote level, CBC and CMP are next due on Tuesday 10/31/23. If patient is discharged before then please provide patient with lab orders to have this completed as an outpatient. Patient is aware that if she leaves before Tuesday she does need to have these labs completed as an outpatient    Thank you for the consult.     This note was created using voice recognition software (Dragon). The accuracy of the dictation is limited by the abilities of the software. I have reviewed the note prior to signing. However, error related to voice recognition software and /or scribes may still exist and should be interpreted within the appropriate context.

## 2023-10-28 ENCOUNTER — PHARMACY VISIT (OUTPATIENT)
Dept: PHARMACY | Facility: MEDICAL CENTER | Age: 68
End: 2023-10-28
Payer: COMMERCIAL

## 2023-10-28 VITALS
HEART RATE: 110 BPM | WEIGHT: 151.9 LBS | RESPIRATION RATE: 19 BRPM | BODY MASS INDEX: 20.57 KG/M2 | DIASTOLIC BLOOD PRESSURE: 67 MMHG | TEMPERATURE: 96.5 F | HEIGHT: 72 IN | SYSTOLIC BLOOD PRESSURE: 142 MMHG | OXYGEN SATURATION: 91 %

## 2023-10-28 PROBLEM — R10.9 ABDOMINAL PAIN: Status: RESOLVED | Noted: 2023-10-14 | Resolved: 2023-10-28

## 2023-10-28 PROBLEM — E83.42 HYPOMAGNESEMIA: Status: RESOLVED | Noted: 2023-01-16 | Resolved: 2023-10-28

## 2023-10-28 PROBLEM — R41.0 DELIRIUM: Status: RESOLVED | Noted: 2023-10-16 | Resolved: 2023-10-28

## 2023-10-28 PROBLEM — E83.39 HYPOPHOSPHATEMIA: Status: RESOLVED | Noted: 2023-10-15 | Resolved: 2023-10-28

## 2023-10-28 PROBLEM — R33.9 URINARY RETENTION: Status: RESOLVED | Noted: 2023-10-22 | Resolved: 2023-10-28

## 2023-10-28 PROBLEM — R00.0 TACHYCARDIA: Status: RESOLVED | Noted: 2019-05-19 | Resolved: 2023-10-28

## 2023-10-28 PROBLEM — R79.89 ABNORMAL TSH: Status: RESOLVED | Noted: 2023-10-17 | Resolved: 2023-10-28

## 2023-10-28 PROBLEM — E87.6 HYPOKALEMIA: Status: RESOLVED | Noted: 2018-12-13 | Resolved: 2023-10-28

## 2023-10-28 PROBLEM — E87.20 LACTIC ACID ACIDOSIS: Status: RESOLVED | Noted: 2023-10-13 | Resolved: 2023-10-28

## 2023-10-28 PROBLEM — E86.0 DEHYDRATION: Status: RESOLVED | Noted: 2018-12-12 | Resolved: 2023-10-28

## 2023-10-28 LAB
ANA INTERPRETIVE COMMENT Q5143: ABNORMAL
ANA PATTERN Q5144: ABNORMAL
ANA TITER Q5145: ABNORMAL
ANION GAP SERPL CALC-SCNC: 10 MMOL/L (ref 7–16)
ANTINUCLEAR ANTIBODY (ANA), HEP-2, IGG Q5142: DETECTED
BUN SERPL-MCNC: 23 MG/DL (ref 8–22)
CALCIUM SERPL-MCNC: 10.5 MG/DL (ref 8.5–10.5)
CHLORIDE SERPL-SCNC: 104 MMOL/L (ref 96–112)
CO2 SERPL-SCNC: 28 MMOL/L (ref 20–33)
CREAT SERPL-MCNC: 1.39 MG/DL (ref 0.5–1.4)
ENA SM IGG SER-ACNC: 1 AU/ML (ref 0–40)
ENA SS-B IGG SER IA-ACNC: 0 AU/ML (ref 0–40)
ERYTHROCYTE [DISTWIDTH] IN BLOOD BY AUTOMATED COUNT: 43.8 FL (ref 35.9–50)
GFR SERPLBLD CREATININE-BSD FMLA CKD-EPI: 41 ML/MIN/1.73 M 2
GLUCOSE SERPL-MCNC: 84 MG/DL (ref 65–99)
HCT VFR BLD AUTO: 38.2 % (ref 37–47)
HGB BLD-MCNC: 12.6 G/DL (ref 12–16)
MCH RBC QN AUTO: 31.1 PG (ref 27–33)
MCHC RBC AUTO-ENTMCNC: 33 G/DL (ref 32.2–35.5)
MCV RBC AUTO: 94.3 FL (ref 81.4–97.8)
PLATELET # BLD AUTO: 151 K/UL (ref 164–446)
PMV BLD AUTO: 10.9 FL (ref 9–12.9)
POTASSIUM SERPL-SCNC: 4.1 MMOL/L (ref 3.6–5.5)
RBC # BLD AUTO: 4.05 M/UL (ref 4.2–5.4)
SODIUM SERPL-SCNC: 142 MMOL/L (ref 135–145)
SSA52 R0ENA AB IGG Q0420: 0 AU/ML (ref 0–40)
SSA60 R0ENA AB IGG Q0419: 1 AU/ML (ref 0–40)
U1 SNRNP IGG SER QL: 4 UNITS (ref 0–19)
WBC # BLD AUTO: 4.5 K/UL (ref 4.8–10.8)

## 2023-10-28 PROCEDURE — 700111 HCHG RX REV CODE 636 W/ 250 OVERRIDE (IP): Mod: JZ | Performed by: HOSPITALIST

## 2023-10-28 PROCEDURE — RXMED WILLOW AMBULATORY MEDICATION CHARGE: Performed by: INTERNAL MEDICINE

## 2023-10-28 PROCEDURE — C9113 INJ PANTOPRAZOLE SODIUM, VIA: HCPCS | Performed by: INTERNAL MEDICINE

## 2023-10-28 PROCEDURE — 700111 HCHG RX REV CODE 636 W/ 250 OVERRIDE (IP): Performed by: INTERNAL MEDICINE

## 2023-10-28 PROCEDURE — 80048 BASIC METABOLIC PNL TOTAL CA: CPT

## 2023-10-28 PROCEDURE — 99239 HOSP IP/OBS DSCHRG MGMT >30: CPT | Performed by: INTERNAL MEDICINE

## 2023-10-28 PROCEDURE — 85027 COMPLETE CBC AUTOMATED: CPT

## 2023-10-28 PROCEDURE — 700105 HCHG RX REV CODE 258: Performed by: INTERNAL MEDICINE

## 2023-10-28 RX ORDER — HYDROCODONE BITARTRATE AND ACETAMINOPHEN 5; 325 MG/1; MG/1
1 TABLET ORAL EVERY 6 HOURS PRN
Qty: 20 TABLET | Refills: 0 | Status: SHIPPED | OUTPATIENT
Start: 2023-10-28 | End: 2023-11-02

## 2023-10-28 RX ADMIN — KETOROLAC TROMETHAMINE 15 MG: 30 INJECTION, SOLUTION INTRAMUSCULAR; INTRAVENOUS at 06:27

## 2023-10-28 RX ADMIN — DIAZEPAM 5 MG: 5 INJECTION, SOLUTION INTRAMUSCULAR; INTRAVENOUS at 04:58

## 2023-10-28 RX ADMIN — VALPROATE SODIUM 500 MG: 100 INJECTION, SOLUTION INTRAVENOUS at 05:03

## 2023-10-28 RX ADMIN — PANTOPRAZOLE SODIUM 40 MG: 40 INJECTION, POWDER, FOR SOLUTION INTRAVENOUS at 04:58

## 2023-10-28 ASSESSMENT — PAIN DESCRIPTION - PAIN TYPE: TYPE: ACUTE PAIN

## 2023-10-28 NOTE — CARE PLAN
The patient is Stable - Low risk of patient condition declining or worsening    Shift Goals  Clinical Goals: Pain management, safe discharge home  Patient Goals: Rest  Family Goals: Discharge home    Progress made toward(s) clinical / shift goals: Appeared to be resting comfortably throughout shift. Able to rest with no interruptions. Patient stated she voided x3 post milton catheter removal. Anticipating & hopeful for discharge soon.    Patient is not progressing towards the following goals: N/A

## 2023-10-28 NOTE — CARE PLAN
Problem: Pain - Standard  Goal: Alleviation of pain or a reduction in pain to the patient’s comfort goal  Outcome: Not Progressing     Problem: Knowledge Deficit - Standard  Goal: Patient and family/care givers will demonstrate understanding of plan of care, disease process/condition, diagnostic tests and medications  Outcome: Progressing   The patient is Stable - Low risk of patient condition declining or worsening    Shift Goals  Clinical Goals: pain management  Patient Goals: Rest  Family Goals: discharge    Progress made toward(s) clinical / shift goals:  patient with nausea/vomiting throughout the day. New orders placed to help control pain and nausea.     Patient is not progressing towards the following goals:      Problem: Pain - Standard  Goal: Alleviation of pain or a reduction in pain to the patient’s comfort goal  Outcome: Not Progressing

## 2023-10-28 NOTE — DISCHARGE SUMMARY
Discharge Summary    CHIEF COMPLAINT ON ADMISSION  Chief Complaint   Patient presents with    Urinary Retention     Since 3 days associated with generalized body pain and abdominal pain  She has milton catheter in place this morning [facility] urologist office, but no urine output since then    Alert and Oriented x 4  Using cane for ambulation       Reason for Admission  Lactic acid acidosis     Admission Date  10/12/2023    CODE STATUS  Prior    HPI & HOSPITAL COURSE  68 y.o. female who presented 10/12/2023  with urinary retention, nausea.  Patient has a history of Crohn's disease status post colectomy with ileostomy on left side, hypertension, chronic pain.  She has had 2 recent emergency room visits due to worsening of chronic abdominal pain and difficulty tolerating p.o. intake with failure of p.o. Zofran at home.  On her last ED visit she had a borderline FLORES with creatinine increased to 1.5 from baseline of 1.2.  She was discharged home after some IV fluids.  She presents again due to concern for decreased urinary output.  She had a Milton placed at urology office for unclear reason however did not drain much urine.  Kidney function stable from last week however did have elevated lactic acid to 3.9.  After some IV fluids she is making some dark urine.  Due to concern for elevated lactic acid and inability to tolerate p.o. intake admission was requested.  CT abdomen unremarkable  Lactic acid 3.9 on admission, which resolved with IVF.  ESR, CRP negative.  Patient then developed symptoms of manic behavior and tangential speech.  Psych was consulted and patient was started on Depakote and placed on legal hold.  Neurology was consulted for evaluation of encephalopathy and patient underwent MRI brain which was negative for acute process.  EEG which was negative for seizure-like activity and LP was obtained which was negative for infection and a paraneoplastic panel was sent out.  Patient was seen by GI. Per GI no plan for  intervention at this time since patient appears to be at baseline and patient was able to tolerate barium for study w/o problems. No additional benefit from further EGD at this point. The patient will need help from the primary doctor to see a specialist either in Tertiary center in [place] or [place] for possible myotomy for her type II achalasia.  This time her symptoms have improved and she will be discharged home.  Her legal hold was rescinded.  She was instructed to follow-up with her PCP, GI, neurologist and given referral for outpatient psychiatry      Therefore, she is discharged in fair and stable condition to home with close outpatient follow-up.    The patient met 2-midnight criteria for an inpatient stay at the time of discharge.    Discharge Date  10/28/2023    FOLLOW UP ITEMS POST DISCHARGE  As above    DISCHARGE DIAGNOSES  Principal Problem (Resolved):    Lactic acid acidosis (POA: Yes)  Active Problems:    Crohn's disease of small intestine with complication (HCC) (POA: Yes)      Overview: Followed by GI Dr. Cruz      S/p ileostomy      Scope 5/2014-no strictures, fistulas, or new abscesses    Essential hypertension (POA: Yes)    Achalasia (POA: Yes)  Resolved Problems:    Dehydration (POA: Unknown)    Hypokalemia (POA: Unknown)    Tachycardia (POA: Unknown)    Hypomagnesemia (POA: Yes)    Abdominal pain (POA: Unknown)    Hypophosphatemia (POA: Unknown)    Delirium (POA: Unknown)    Abnormal TSH (POA: Unknown)    Urinary retention (POA: Yes)      FOLLOW UP  Future Appointments   Date Time Provider Department Center   11/22/2023  2:40 PM Chase Charles D.O. [facility] [place] [facility]   12/11/2023  1:20 PM [facility] None   12/15/2023  2:00 PM [id number] None   12/19/2023  2:00 PM Hao Stein D.O. [facility] None     Kim Akers D.O.  [address] [facility]  [address]  [facility] [address]  [phone]    Schedule an appointment as soon as possible for a visit in 1  week(s)      Chase Charles D.O.  [address] [address]  [phone]    Follow up in 1 week(s)        MEDICATIONS ON DISCHARGE     Medication List        START taking these medications        Instructions   HYDROcodone-acetaminophen 5-325 MG Tabs per tablet  Commonly known as: Norco   Take 1 Tablet by mouth every 6 hours as needed (pain) for up to 5 days.  Dose: 1 Tablet     valproic acid 250 MG/5ML Soln  Commonly known as: Depakene   Take 10 mL by mouth 2 times a day for 30 days.  Dose: 500 mg            CHANGE how you take these medications        Instructions   oxyCODONE immediate release 10 MG immediate release tablet  Start taking on: November 20, 2023  What changed: Another medication with the same name was removed. Continue taking this medication, and follow the directions you see here.  Commonly known as: Roxicodone   Take 1 Tablet by mouth every 6 hours as needed for Severe Pain (Refill #3 of #3.) for up to 30 days.  Dose: 10 mg            CONTINUE taking these medications        Instructions   Azelastine 137 MCG/SPRAY Soln  Commonly known as: Astelin   Administer 1 Spray into each nostril at bedtime.  Dose: 1 Spray     Centrum Silver 50+Women Tabs   Take 1 Tablet by mouth 2 times a day. GUMMIES.  Dose: 1 Tablet     fluticasone 50 MCG/ACT nasal spray  Commonly known as: Flonase   Administer 1 Spray into each nostril at bedtime.  Dose: 1 Spray     granisetron 1 MG Tabs  Commonly known as: Kytril   Take 1 Tablet by mouth every 12 hours as needed for Nausea/Vomiting.  Dose: 1 mg     HAIR SKIN & NAILS GUMMIES PO   Take 1 Tablet by mouth 2 times a day.  Dose: 1 Tablet     hydroxychloroquine 200 MG Tabs  Commonly known as: Plaquenil   Take 1.5 Tablets by mouth every day.  Dose: 300 mg     levalbuterol 45 MCG/ACT inhaler  Commonly known as: Xopenex HFA   Inhale 2 Puffs every four hours as needed for Shortness of Breath (As needed for shortness of breath, cough, wheezing.).  Dose: 2  Puff     metoprolol tartrate 50 MG Tabs  Commonly known as: Lopressor   Take 1 Tablet by mouth 2 times a day.  Dose: 50 mg     naratriptan 2.5 MG tablet  Commonly known as: Amerge   Take 1 Tablet by mouth as needed for Migraine.  Dose: 2.5 mg     nystatin 541254 UNIT/ML Susp  Commonly known as: Mycostatin   Take 5 mL by mouth 4 times a day.  Dose: 500,000 Units     ondansetron 4 MG Tbdp  Commonly known as: Zofran ODT   Take 1 Tablet by mouth every 6 hours as needed for Nausea/Vomiting.  Dose: 4 mg     prochlorperazine 10 MG Tabs  Commonly known as: Compazine   Take 1 Tablet by mouth every 6 hours as needed for Nausea/Vomiting.  Dose: 10 mg     traZODone 50 MG Tabs  Commonly known as: Desyrel   Take 1 Tablet by mouth every evening.  Dose: 50 mg     VITAMIN C PO   Take 1 Tablet by mouth 2 times a day.  Dose: 1 Tablet     ZINC PICOLINATE PO   Take 1 Tablet by mouth 2 times a day.  Dose: 1 Tablet            STOP taking these medications      furosemide 20 MG Tabs  Commonly known as: Lasix     methylPREDNISolone 4 MG Tbpk  Commonly known as: Medrol Dosepak     potassium chloride SA 20 MEQ Tbcr  Commonly known as: Kdur     spironolactone 25 MG Tabs  Commonly known as: Aldactone            ASK your doctor about these medications        Instructions   diazePAM 5 MG Tabs  Commonly known as: Valium  Ask about: Should I take this medication?   Take 1 Tablet by mouth every 6 hours as needed (SPASMS) for up to 30 days.  Dose: 5 mg              Allergies  Allergies   Allergen Reactions    Demerol Hcl [Meperidine] Shortness of Breath and Palpitations    Methotrexate Hives, Shortness of Breath and Rash          Morphine Shortness of Breath and Palpitations    Promethazine Shortness of Breath and Rash          Remicade [Infliximab] Hives, Shortness of Breath and Rash          Sudafed [Pseudoephedrine] Shortness of Breath, Vomiting and Palpitations    Azathioprine Sodium Hives and Shortness of Breath     10/21/2022 - patient unable  "to verify allergy/reaction  Historical reaction listed: Hives, Shortness of Breath    Carafate [Sucralfate] Vomiting and Nausea     + Mouth Sores    Other Drug Unspecified     \"STEROIDS\" - REACTION NOT SPECIFIED    Sulfa Drugs Rash          Sulfasalazine Rash          Amitriptyline Unspecified     Nightmares      Ativan [Lorazepam] Unspecified     Nightmares    Betamethasone Unspecified     10/21/2022 - patient unable to verify allergy/reaction  No historical reaction listed    Fentanyl Unspecified     \"Patches didn't work\" and skin broke down    Lyrica [Pregabalin] Nausea          Methadone Unspecified     \"Didn't work\"    Potassium Unspecified     Notes: In IV only  REACTION NOT SPECIFIED    Tizanidine Unspecified     10/21/2022 - patient unable to verify allergy/reaction  No historical reaction listed       DIET  No orders of the defined types were placed in this encounter.      ACTIVITY  As tolerated.  Weight bearing as tolerated    CONSULTATIONS  Psych Dr Law  GI Dr Davalos  Neurology Dr Moore    PROCEDURES  none    LABORATORY  Lab Results   Component Value Date    SODIUM 142 10/28/2023    POTASSIUM 4.1 10/28/2023    CHLORIDE 104 10/28/2023    CO2 28 10/28/2023    GLUCOSE 84 10/28/2023    BUN 23 (H) 10/28/2023    CREATININE 1.39 10/28/2023    CREATININE 0.89 02/10/2010    GLOMRATE >59 02/10/2010        Lab Results   Component Value Date    WBC 4.5 (L) 10/28/2023    WBC 7.9 02/10/2010    HEMOGLOBIN 12.6 10/28/2023    HEMATOCRIT 38.2 10/28/2023    PLATELETCT 151 (L) 10/28/2023        Total time of the discharge process exceeds 37 minutes.  "

## 2023-10-28 NOTE — PROGRESS NOTES
Patient arrived to [facility]. MD paper work, meds, and follow up appointments reviewed with patient. Questions addressed. Ride home with family.

## 2023-10-28 NOTE — DISCHARGE INSTRUCTIONS
Metabolic Acidosis  Metabolic acidosis is a condition in which there is too much acid in the blood. It happens when certain chemicals in the body's cells are too high or too low. This condition can happen at any age, and there are many different causes. It may be a symptom of a sudden, short-term (acute) condition, or the result of a long-term (chronic) condition.  Metabolic acidosis can be mild or it can be severe and life-threatening, depending on the cause. It can be reversed if the cause is identified and properly treated. This condition is a medical emergency.  What are the causes?  This condition may be caused by:  The body producing too much acid.  Losing too much of a chemical that balances acid levels (bicarbonate). This can result from diarrhea or from certain surgeries on the stomach and intestines.  Extra buildup of acidic chemicals (ketone bodies) in the blood due to untreated type 1 diabetes.  Extra buildup of a chemical (lactic acid) that forms when the body is low on oxygen. This can result from:  The heart, lungs, liver, kidneys, or brain not getting enough blood, oxygen, and nutrients (shock).  Intense physical activity.  Certain diseases.  Serious infections.  Exposure to a poisonous substance (toxin), such as:  Carbon monoxide.  Cyanide.  Antifreeze (ethylene glycol).  Methanol.  Certain medicines, such as acetazolamide or large doses of aspirin.  Kidney disease or kidney infection.  Too much iron in the body.  Excessive alcohol use.  Conditions that cause you to have a low level of red blood cells (anemia).  What are the signs or symptoms?  Symptoms of this condition vary depending on the cause and severity. Common symptoms include:  Rapid breathing.  Difficulty breathing.  Nausea or vomiting.  Headache.  Confusion.  Weakness.  Feeling restless.  Feeling drowsy and emotionless (lethargy).  Mild acidosis may not cause any symptoms. Severe acidosis can result in shock, coma, or death.  How is this  diagnosed?  This condition may be diagnosed based on:  A physical exam.  Your medical history.  Blood tests. These may include:  An arterial blood gas (ABG) test or a venous blood gas (VBG) test. These tests measure the acidity levels (pH balance) of your blood.  A serum electrolytes test. This test measures the amounts of minerals in your blood.  Urine tests.  How is this treated?  Treatment for metabolic acidosis depends on the underlying condition and the severity of symptoms. The goal of treatment is to balance the amount of acid in the body and to treat the underlying cause.  Mild metabolic acidosis may be treated with:  Fluids given through an IV.  Medicines.  Close monitoring with blood tests.  Severe forms of metabolic acidosis may require:  Hospitalization and close monitoring with blood tests.  Medicines or procedures.  Bicarbonates. These may be given through an IV if your condition is very severe.  Follow these instructions at home:    Take over-the-counter and prescription medicines only as told by your health care provider.  If you were prescribed an antibiotic medicine, take it as told by your health care provider. Do not stop taking the antibiotic even if you start to feel better.  Drink enough fluid to keep your urine pale yellow.  Do not drink alcohol.  Follow instructions from your health care provider about eating or drinking restrictions.  If you have diabetes, check your urine for ketones when you are ill and as told by your health care provider.  Pay attention to your symptoms for any changes.  Keep all follow-up visits as told by your health care provider. This is important.  Contact a health care provider if you have:  Any new symptoms.  Side effects from medicines you are taking.  Nausea, vomiting, or diarrhea that does not get better with medicine or that gets worse.  Body aches or lethargy that gets worse.  Dark urine or you urinate less often than you usually do.  A decreased  appetite.  A fever.  Get help right away if you:  Have shortness of breath, chest pain, or a fast heartbeat (palpitations).  Feel like you are losing consciousness, or you faint.  Feel drowsy or confused.  Have severe pain in your joints and limbs.  Have severe abdominal pain.  These symptoms may represent a serious problem that is an emergency. Do not wait to see if the symptoms will go away. Get medical help right away. Call your local emergency services (911 in the [place] Do not drive yourself to the hospital.  Summary  Metabolic acidosis is a condition in which there is too much acid in the blood. It happens because of a chemical imbalance in your cells in the body.  Metabolic acidosis can be mild or it can be severe and life-threatening, depending on the cause. Metabolic acidosis can be corrected if the cause is identified and properly treated.  If you are at risk for this condition, be aware of symptoms of metabolic acidosis.  Metabolic acidosis is a medical emergency.  This information is not intended to replace advice given to you by your health care provider. Make sure you discuss any questions you have with your health care provider.  Document Revised: 08/09/2022 Document Reviewed: 08/09/2022  Elsevier Patient Education © 2023 [employer]

## 2023-10-29 LAB
DSDNA AB TITR SER CLIF: NORMAL {TITER}
VDRL CSF QL: NON REACTIVE

## 2023-10-30 ENCOUNTER — PATIENT OUTREACH (OUTPATIENT)
Dept: MEDICAL GROUP | Facility: LAB | Age: 68
End: 2023-10-30
Payer: MEDICARE

## 2023-10-30 LAB — MOG AB TITR SER CBA IFA: ABNORMAL {TITER}

## 2023-10-31 ENCOUNTER — APPOINTMENT (OUTPATIENT)
Dept: RHEUMATOLOGY | Facility: MEDICAL CENTER | Age: 68
DRG: 699 | End: 2023-10-31
Attending: STUDENT IN AN ORGANIZED HEALTH CARE EDUCATION/TRAINING PROGRAM
Payer: MEDICARE

## 2023-10-31 ENCOUNTER — TELEPHONE (OUTPATIENT)
Dept: MEDICAL GROUP | Facility: LAB | Age: 68
End: 2023-10-31
Payer: MEDICARE

## 2023-10-31 DIAGNOSIS — D64.9 ANEMIA, UNSPECIFIED TYPE: ICD-10-CM

## 2023-10-31 DIAGNOSIS — R10.84 GENERALIZED ABDOMINAL PAIN: ICD-10-CM

## 2023-10-31 DIAGNOSIS — Z93.2 ILEOSTOMY IN PLACE (HCC): ICD-10-CM

## 2023-10-31 DIAGNOSIS — K50.019 CROHN'S DISEASE OF SMALL INTESTINE WITH COMPLICATION (HCC): ICD-10-CM

## 2023-10-31 DIAGNOSIS — K22.2 ESOPHAGEAL STRICTURE: ICD-10-CM

## 2023-10-31 NOTE — PROGRESS NOTES
Transitional Care Management  TCM Outreach Date and Time: Filed (10/30/2023  3:11 PM)    Discharge Questions  Actual Discharge Date: 10/28/23  Now that you are home, how are you feeling?: Fair (States she is slowly returning to normal; remains exhausted but is taking rest periods and trying to do a little exercise.  Denies urinary issues; milton removed; is urinating normally)  Did you receive any new prescriptions?: Yes  Were you able to get them filled?: Yes  Meds to Bed or Pharmacy filled?: Pharmacy  Do you have any questions about your current medications or new medications (Review Med Rec)?: No  Do you have a follow up appointment scheduled with your PCP?: Yes  Appointment Date: 11/10/23  Appointment Time: 1320  Any issues or paperwork you wish to discuss with your PCP?: No  Does this patient qualify for the CCM program?: Yes    Transitional Care  Number of attempts made to contact patient: 2  Current or previous attempts competed within two business days of discharge? : Yes  Provided education regarding treatment plan, medications, self-management, ADLs?: No  Has patient completed an Advanced Directive?: No  Has the Care Manager's phone number provided?: No  Is there anything else I can help you with?: No    Discharge Summary  Chief Complaint: Urologist Office to ER with new milton catheter insertion; Urinary retention; Body aches  Admitting Diagnosis: Urinary retention; Low urninary output; Elevated lactic acid  Discharge Diagnosis: Lactic acid acidosis; Crohn's; Achalasia; Delerium

## 2023-10-31 NOTE — TELEPHONE ENCOUNTER
1. Caller Name: Goyo                        Call Back Number: [phone]      How would the patient prefer to be contacted with a response: Phone call OK to leave a detailed message    Pt is needing her Oxycodone Rx for October called in ASAP please.  She is leaving [facility] for 10 days and needs to pick this up today.

## 2023-11-01 LAB
ENA JO1 AB TITR SER: 1 AU/ML (ref 0–40)
ENA SCL70 IGG SER QL: 1 AU/ML (ref 0–40)

## 2023-11-01 RX ORDER — OXYCODONE HYDROCHLORIDE 10 MG/1
10 TABLET ORAL EVERY 6 HOURS PRN
Qty: 120 TABLET | Refills: 0 | Status: SHIPPED | OUTPATIENT
Start: 2023-11-01 | End: 2023-11-29

## 2023-11-03 LAB
AMPAR 1+2 IGG SERPL QL IF: ABNORMAL
AQP4 H2O CHANNEL IGG SERPL QL IF: ABNORMAL
CASPR2 IGG SERPL QL IF: ABNORMAL
DPPX IGG SER QL CBA IFA: ABNORMAL
GABA-AR AB IGG CBA-IFA SCREEN, SERUM NL11702: ABNORMAL
GABABR IGG SERPL QL CBA IFA: ABNORMAL
GAD65 AB SER IA-ACNC: <5 IU/ML (ref 0–5)
IGLON5 IGG SER QL CBA IFA: ABNORMAL
LGI1 IGG SERPL QL IF: ABNORMAL
MGLUR1 IGG SER QL CBA IFA: ABNORMAL
MOG AB SER QL CBA IFA: DETECTED
NMDAR IGG TITR SER IF: ABNORMAL {TITER}
VGKC AB SER-SCNC: 0 PMOL/L (ref 0–31)

## 2023-11-06 LAB — TEST NAME 95000: NORMAL

## 2023-11-08 LAB — TEST NAME 95000: NORMAL

## 2023-11-10 ENCOUNTER — APPOINTMENT (OUTPATIENT)
Dept: MEDICAL GROUP | Facility: LAB | Age: 68
End: 2023-11-10
Payer: MEDICARE

## 2023-11-13 NOTE — DOCUMENTATION QUERY
"                                                                         [facility]                                                                       Query Response Note      PATIENT:               YONATHAN JUDD  ACCT #:                  [id number]  MRN:                     [id number]  :                      1955  ADMIT DATE:       10/12/2023 7:30 PM  DISCH DATE:        10/12/2023 10:26 PM  RESPONDING  PROVIDER #:        [id number]           QUERY TEXT:    The diagnosis of sepsis has been documented in the ED Provider Notes.    Please clarify the status of sepsis after study:    The patient's Clinical Indicators include:  ED Provider Notes: \"CT with no signs of obstruction, no evidence of infection ... Lactate may be related to dehydration or from underlying sepsis. ... FLORES ... Sepsis with acute organ dysfunction\"  10/14 GI Consult: \"admitted for urinary tract obstruction, possible UTI\"  D/C Summary: \"LP was obtained which was negative for infection\"    10/12 UA: Cloudy, Occult Blood Large, Protein 300, Nitrite Negative, Leukocyte Esterase Negative, WBC 5-10, RBC > 150, Bacteria Negative  10/12 Urine Culture: No growth at 48 hours.  10/12 Blood Cultures: No growth after 5 days of incubation.    10/12 Labs: WBC 10.3, , CR 1.52 > 1.09 (10/14), Total Bilirubin 0.5, Lactic Acid 3.9 > 1.2  10/12 Vitals: T98.8, -123, RR 12-24, -134, 95% on 2L NC    Risk Factors: FLORES, lactic acidosis, tachycardia, urinary tract obstruction/retention  Treatment: IV fluids/bolus, IV Rocephin x1 on 10/13    Thank You,  Selene Martínez RN  Clinical    Connect via [employer] or [email or web address]  Options provided:   -- Sepsis ruled in, (please specify source of infection and sepsis-related organ dysfunction)   -- Sepsis ruled out   -- Other explanation, (please specify other explanation)   -- Unable to determine      Query created by: Selene Martínez on 2023 9:51 AM    RESPONSE " TEXT:    Sepsis ruled out          Electronically signed by:  DONTE WHITE MD 11/13/2023 2:22 PM

## 2023-11-14 ENCOUNTER — TELEPHONE (OUTPATIENT)
Dept: MEDICAL GROUP | Facility: LAB | Age: 68
End: 2023-11-14
Payer: MEDICARE

## 2023-11-14 NOTE — TELEPHONE ENCOUNTER
1. Caller Name: Jana                        Call Back Number: [phone] (home)        How would the patient prefer to be contacted with a response: Phone call OK to leave a detailed message    Jana thought her appt was today.  It is not until next week.  She wants to know how much Furosemide she should be on.

## 2023-11-15 ENCOUNTER — PATIENT MESSAGE (OUTPATIENT)
Dept: MEDICAL GROUP | Facility: LAB | Age: 68
End: 2023-11-15
Payer: MEDICARE

## 2023-11-15 NOTE — TELEPHONE ENCOUNTER
Eliz:  Is Jana currently on furosemide (Lasix) and potassium chloride?  Regards, Chase Charles, DO

## 2023-11-17 ENCOUNTER — HOSPITAL ENCOUNTER (OUTPATIENT)
Dept: RADIOLOGY | Facility: MEDICAL CENTER | Age: 68
End: 2023-11-17
Attending: INTERNAL MEDICINE
Payer: MEDICARE

## 2023-11-17 ENCOUNTER — HOSPITAL ENCOUNTER (OUTPATIENT)
Dept: LAB | Facility: MEDICAL CENTER | Age: 68
End: 2023-11-17
Attending: INTERNAL MEDICINE
Payer: MEDICARE

## 2023-11-17 ENCOUNTER — TELEPHONE (OUTPATIENT)
Dept: MEDICAL GROUP | Facility: LAB | Age: 68
End: 2023-11-17
Payer: MEDICARE

## 2023-11-17 ENCOUNTER — HOSPITAL ENCOUNTER (OUTPATIENT)
Dept: LAB | Facility: MEDICAL CENTER | Age: 68
End: 2023-11-17
Attending: STUDENT IN AN ORGANIZED HEALTH CARE EDUCATION/TRAINING PROGRAM
Payer: MEDICARE

## 2023-11-17 DIAGNOSIS — M79.604 RIGHT LEG PAIN: ICD-10-CM

## 2023-11-17 DIAGNOSIS — L93.2 DRUG-INDUCED LUPUS ERYTHEMATOSUS: ICD-10-CM

## 2023-11-17 DIAGNOSIS — R73.02 IGT (IMPAIRED GLUCOSE TOLERANCE): ICD-10-CM

## 2023-11-17 DIAGNOSIS — R60.9 EDEMA, UNSPECIFIED TYPE: ICD-10-CM

## 2023-11-17 DIAGNOSIS — K50.019 CROHN'S DISEASE OF SMALL INTESTINE WITH COMPLICATION (HCC): ICD-10-CM

## 2023-11-17 DIAGNOSIS — T50.905A DRUG-INDUCED LUPUS ERYTHEMATOSUS: ICD-10-CM

## 2023-11-17 DIAGNOSIS — E43 EDEMA DUE TO MALNUTRITION, DUE TO UNSPECIFIED MALNUTRITION TYPE (HCC): ICD-10-CM

## 2023-11-17 LAB
D DIMER PPP IA.FEU-MCNC: 1.23 UG/ML (FEU) (ref 0–0.5)
ERYTHROCYTE [SEDIMENTATION RATE] IN BLOOD BY WESTERGREN METHOD: 29 MM/HOUR (ref 0–25)
EST. AVERAGE GLUCOSE BLD GHB EST-MCNC: 105 MG/DL
HBA1C MFR BLD: 5.3 % (ref 4–5.6)

## 2023-11-17 PROCEDURE — 80053 COMPREHEN METABOLIC PANEL: CPT

## 2023-11-17 PROCEDURE — 85652 RBC SED RATE AUTOMATED: CPT

## 2023-11-17 PROCEDURE — 85379 FIBRIN DEGRADATION QUANT: CPT

## 2023-11-17 PROCEDURE — 83036 HEMOGLOBIN GLYCOSYLATED A1C: CPT | Mod: GA

## 2023-11-17 PROCEDURE — 86140 C-REACTIVE PROTEIN: CPT

## 2023-11-17 PROCEDURE — 36415 COLL VENOUS BLD VENIPUNCTURE: CPT

## 2023-11-17 PROCEDURE — 93971 EXTREMITY STUDY: CPT | Mod: RT

## 2023-11-17 PROCEDURE — 86160 COMPLEMENT ANTIGEN: CPT | Mod: 91

## 2023-11-17 RX ORDER — METOLAZONE 2.5 MG/1
2.5 TABLET ORAL DAILY
Qty: 5 TABLET | Refills: 0 | Status: SHIPPED | OUTPATIENT
Start: 2023-11-17 | End: 2023-11-29

## 2023-11-17 NOTE — TELEPHONE ENCOUNTER
Jana was seen in the office today is having problems with bilateral leg swelling, and is very concerned about returning the hospital, because of the complexity of the case she feels that the emergency room cannot deal with it at this time.  Patient and I have discussed the importance of her undergoing an ultrasound of her right leg which has been ordered stat because of pain discomfort.  I have also recommended she start low-dose furosemide will add to this short-term approximately 5 days of Zaroxolyn to be taken once in the morning.

## 2023-11-18 LAB
ALBUMIN SERPL BCP-MCNC: 4.3 G/DL (ref 3.2–4.9)
ALBUMIN/GLOB SERPL: 1.3 G/DL
ALP SERPL-CCNC: 96 U/L (ref 30–99)
ALT SERPL-CCNC: 20 U/L (ref 2–50)
ANION GAP SERPL CALC-SCNC: 11 MMOL/L (ref 7–16)
AST SERPL-CCNC: 27 U/L (ref 12–45)
BILIRUB SERPL-MCNC: 0.5 MG/DL (ref 0.1–1.5)
BUN SERPL-MCNC: 21 MG/DL (ref 8–22)
C3 SERPL-MCNC: 157.7 MG/DL (ref 87–200)
C4 SERPL-MCNC: 48 MG/DL (ref 19–52)
CALCIUM ALBUM COR SERPL-MCNC: 9.6 MG/DL (ref 8.5–10.5)
CALCIUM SERPL-MCNC: 9.8 MG/DL (ref 8.5–10.5)
CHLORIDE SERPL-SCNC: 101 MMOL/L (ref 96–112)
CO2 SERPL-SCNC: 28 MMOL/L (ref 20–33)
CREAT SERPL-MCNC: 1.11 MG/DL (ref 0.5–1.4)
CRP SERPL HS-MCNC: <0.3 MG/DL (ref 0–0.75)
GFR SERPLBLD CREATININE-BSD FMLA CKD-EPI: 53 ML/MIN/1.73 M 2
GLOBULIN SER CALC-MCNC: 3.2 G/DL (ref 1.9–3.5)
GLUCOSE SERPL-MCNC: 85 MG/DL (ref 65–99)
POTASSIUM SERPL-SCNC: 3.8 MMOL/L (ref 3.6–5.5)
PROT SERPL-MCNC: 7.5 G/DL (ref 6–8.2)
SODIUM SERPL-SCNC: 140 MMOL/L (ref 135–145)

## 2023-11-22 ENCOUNTER — OFFICE VISIT (OUTPATIENT)
Dept: MEDICAL GROUP | Facility: LAB | Age: 68
End: 2023-11-22
Payer: MEDICARE

## 2023-11-22 VITALS
OXYGEN SATURATION: 98 % | TEMPERATURE: 97.5 F | HEART RATE: 78 BPM | BODY MASS INDEX: 20.15 KG/M2 | DIASTOLIC BLOOD PRESSURE: 80 MMHG | RESPIRATION RATE: 14 BRPM | HEIGHT: 72 IN | WEIGHT: 148.8 LBS | SYSTOLIC BLOOD PRESSURE: 120 MMHG

## 2023-11-22 DIAGNOSIS — K22.0 ACHALASIA: ICD-10-CM

## 2023-11-22 DIAGNOSIS — R11.2 NAUSEA AND VOMITING, UNSPECIFIED VOMITING TYPE: ICD-10-CM

## 2023-11-22 DIAGNOSIS — N18.32 CHRONIC KIDNEY DISEASE, STAGE 3B: ICD-10-CM

## 2023-11-22 DIAGNOSIS — E43 EDEMA DUE TO MALNUTRITION, DUE TO UNSPECIFIED MALNUTRITION TYPE (HCC): ICD-10-CM

## 2023-11-22 DIAGNOSIS — L03.119 CELLULITIS OF LOWER EXTREMITY, UNSPECIFIED LATERALITY: ICD-10-CM

## 2023-11-22 PROBLEM — R40.4 TRANSIENT ALTERATION OF AWARENESS: Status: ACTIVE | Noted: 2023-10-26

## 2023-11-22 PROCEDURE — 3079F DIAST BP 80-89 MM HG: CPT | Performed by: INTERNAL MEDICINE

## 2023-11-22 PROCEDURE — 99214 OFFICE O/P EST MOD 30 MIN: CPT | Performed by: INTERNAL MEDICINE

## 2023-11-22 PROCEDURE — 3074F SYST BP LT 130 MM HG: CPT | Performed by: INTERNAL MEDICINE

## 2023-11-22 RX ORDER — FUROSEMIDE 40 MG/1
40 TABLET ORAL DAILY
Qty: 30 TABLET | Refills: 2 | Status: SHIPPED | OUTPATIENT
Start: 2023-11-22 | End: 2023-12-11

## 2023-11-22 RX ORDER — POTASSIUM CHLORIDE 20 MEQ/1
20 TABLET, EXTENDED RELEASE ORAL 2 TIMES DAILY
Qty: 60 TABLET | Refills: 2 | Status: SHIPPED | OUTPATIENT
Start: 2023-11-22 | End: 2024-01-29

## 2023-11-22 RX ORDER — CEPHALEXIN 500 MG/1
500 CAPSULE ORAL 4 TIMES DAILY
Qty: 28 CAPSULE | Refills: 0 | Status: SHIPPED | OUTPATIENT
Start: 2023-11-22 | End: 2024-02-02

## 2023-11-22 RX ORDER — SCOLOPAMINE TRANSDERMAL SYSTEM 1 MG/1
1 PATCH, EXTENDED RELEASE TRANSDERMAL
Qty: 4 PATCH | Refills: 3 | Status: SHIPPED | OUTPATIENT
Start: 2023-11-22

## 2023-11-22 ASSESSMENT — FIBROSIS 4 INDEX: FIB4 SCORE: 2.8

## 2023-11-23 NOTE — PROGRESS NOTES
CC: Jana Overton is a 68 y.o. female is suffering from   Chief Complaint   Patient presents with    Follow-Up         SUBJECTIVE:  1. Chronic kidney disease, stage 3b (HCC)  Jana is here for follow-up, and suffering from chronic kidney disease, is doing reasonably well we will continue to monitor this we have patient on furosemide along with potassium have recommended that she check a CBC comprehensive metabolic panel nonfasting today    2. Edema due to malnutrition, due to unspecified malnutrition type (HCC)  Edema likely because of third spacing secondary to low albumin    3. Achalasia  Achalasia, patient and I have discussed that she would likely benefit from a consultation done at [facility], referral has been written to Dr. Dionicio Prieto    4. Nausea and vomiting, unspecified vomiting type  Nausea vomiting, patient has Zofran at home would like to use scopolamine, patch.    5. Cellulitis of lower extremity, unspecified laterality  Likely cellulitis bilateral lower extremity secondary to edema as detailed above        Past social, family, history: , G[place]  Social History     Tobacco Use    Smoking status: Never    Smokeless tobacco: Never    Tobacco comments:     second hand smoke parents - smoked for only 1 year many years ago   Vaping Use    Vaping Use: Every day    Substances: THC   Substance Use Topics    Alcohol use: Not Currently     Alcohol/week: 0.6 oz     Types: 1 Shots of liquor per week     Comment: gin and half a lime; tonic water    Drug use: Yes     Types: Marijuana, Inhaled     Comment: medical marijuana through bong/vape         MEDICATIONS:    Current Outpatient Medications:     furosemide (LASIX) 40 MG Tab, Take 1 Tablet by mouth every day for 90 days., Disp: 30 Tablet, Rfl: 2    potassium chloride SA (KDUR) 20 MEQ Tab CR, Take 1 Tablet by mouth 2 times a day., Disp: 60 Tablet, Rfl: 2    scopolamine (TRANSDERM SCOP, 1.5 MG,) 1 mg/72hr PATCH 72 HR, Place 1  Patch on the skin every 72 hours., Disp: 4 Patch, Rfl: 3    cephALEXin (KEFLEX) 500 MG Cap, Take 1 Capsule by mouth 4 times a day., Disp: 28 Capsule, Rfl: 0    metOLazone (ZAROXOLYN) 2.5 MG Tab, Take 1 Tablet by mouth every day., Disp: 5 Tablet, Rfl: 0    oxyCODONE immediate release (ROXICODONE) 10 MG immediate release tablet, Take 1 Tablet by mouth every 6 hours as needed for Severe Pain for up to 30 days., Disp: 120 Tablet, Rfl: 0    valproate Sodium (DEPAKENE) 250 MG/5ML Solution, Take 10 mL by mouth 2 times a day for 30 days., Disp: 600 mL, Rfl: 0    Azelastine (ASTELIN) 137 MCG/SPRAY Solution, Administer 1 Spray into each nostril at bedtime., Disp: , Rfl:     fluticasone (FLONASE) 50 MCG/ACT nasal spray, Administer 1 Spray into each nostril at bedtime., Disp: , Rfl:     nystatin (MYCOSTATIN) 723871 UNIT/ML Suspension, Take 5 mL by mouth 4 times a day., Disp: 60 mL, Rfl: 0    prochlorperazine (COMPAZINE) 10 MG Tab, Take 1 Tablet by mouth every 6 hours as needed for Nausea/Vomiting., Disp: 30 Tablet, Rfl: 3    oxyCODONE immediate release (ROXICODONE) 10 MG immediate release tablet, Take 1 Tablet by mouth every 6 hours as needed for Severe Pain (Refill #3 of #3.) for up to 30 days., Disp: 120 Tablet, Rfl: 0    traZODone (DESYREL) 50 MG Tab, Take 1 Tablet by mouth every evening., Disp: 90 Tablet, Rfl: 3    hydroxychloroquine (PLAQUENIL) 200 MG Tab, Take 1.5 Tablets by mouth every day., Disp: 135 Tablet, Rfl: 3    ondansetron (ZOFRAN ODT) 4 MG TABLET DISPERSIBLE, Take 1 Tablet by mouth every 6 hours as needed for Nausea/Vomiting., Disp: 20 Tablet, Rfl: 5    granisetron (KYTRIL) 1 MG Tab, Take 1 Tablet by mouth every 12 hours as needed for Nausea/Vomiting., Disp: 10 Tablet, Rfl: 0    metoprolol tartrate (LOPRESSOR) 50 MG Tab, Take 1 Tablet by mouth 2 times a day., Disp: 180 Tablet, Rfl: 2    levalbuterol (XOPENEX HFA) 45 MCG/ACT inhaler, Inhale 2 Puffs every four hours as needed for Shortness of Breath (As needed for  shortness of breath, cough, wheezing.)., Disp: 1 Each, Rfl: 11    Multiple Vitamins-Minerals (CENTRUM SILVER 50+WOMEN) Tab, Take 1 Tablet by mouth 2 times a day. GUMMIES., Disp: , Rfl:     ZINC PICOLINATE PO, Take 1 Tablet by mouth 2 times a day., Disp: , Rfl:     Ascorbic Acid (VITAMIN C PO), Take 1 Tablet by mouth 2 times a day., Disp: , Rfl:     Biotin w/ Vitamins C & E (HAIR SKIN & NAILS GUMMIES PO), Take 1 Tablet by mouth 2 times a day., Disp: , Rfl:     naratriptan (AMERGE) 2.5 MG tablet, Take 1 Tablet by mouth as needed for Migraine., Disp: 5 Tablet, Rfl: 3    PROBLEMS:  Patient Active Problem List    Diagnosis Date Noted    Chronic kidney disease, stage 3b ([facility]) 11/22/2023    Transient alteration of awareness 10/26/2023    Achalasia 10/18/2023    Esophageal candidiasis ([facility]) 02/08/2023    Sacroiliac joint pain 11/10/2022    Esophageal stricture 10/22/2022    Dysphagia 10/21/2022    Drug-induced lupus erythematosus 09/27/2022    Long-term use of hydroxychloroquine 09/27/2022    Fibromyalgia syndrome 09/27/2022    Positive cardiolipin antibodies (IgA and IgG anti-CL) 07/27/2022    Positive VAHID (1:640 homogenous pattern with negative reflex) 07/27/2022    Inflammatory arthritis 07/27/2022    Mild tetrahydrocannabinol (THC) abuse 06/02/2022    Oropharyngeal dysphagia 06/02/2022    Acute pancreatitis 05/23/2022    Migraine 06/04/2019    FLORES (acute kidney injury) ([facility]) 06/01/2019    Essential hypertension 05/20/2019    SIRS (systemic inflammatory response syndrome) ([facility]) 05/19/2019    History of MRSA infection 12/29/2018    History of infection with vancomycin resistant Enterococcus (VRE) 12/29/2018    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[facility]) 12/28/2018    Anemia 12/15/2018    Hyponatremia 12/12/2018    Leukocytosis 12/12/2018    Chronic respiratory failure with hypoxia ([facility]) 03/20/2018    Anxiety disorder due to multiple medical problems 12/01/2017    DDD (degenerative disc disease), lumbar 04/12/2017    History of DVT (deep  vein thrombosis) 11/23/2016    Atrial fibrillation ([facility]) 03/26/2015    Sleep apnea 10/26/2014    Postherpetic neuralgia 06/24/2014    Multiple falls 06/24/2014    COPD (chronic obstructive pulmonary disease) ([facility]) 06/24/2014    Rosacea 06/24/2014    Ileostomy in place ([facility]) 06/26/2013    GERD (gastroesophageal reflux disease) 12/05/2012    Status post lumbar surgery 07/16/2012    Crohn's disease of small intestine with complication ([facility]) 09/23/2009    Central sensitization to pain 09/23/2009    Opioid type dependence, continuous ([facility]) 09/23/2009    Degenerative joint disease of cervical and lumbar spine 09/23/2009       REVIEW OF SYSTEMS:  Gen.:  No Nausea, Vomiting, fever, Chills.  Heart: No chest pain.  Lungs:  No shortness of Breath.  Psychological: Rg unusual Anxiety depression     PHYSICAL EXAM   Constitutional: Alert, cooperative, not in acute distress.  Cardiovascular:  Rate Rhythm is regular without murmurs rubs clicks.     Thorax & Lungs: Clear to auscultation, no wheezing, rhonchi, or rales  HENT: Normocephalic, Atraumatic.  Eyes: PERRLA, EOMI, Conjunctiva normal.   Neck: Trachia is midline no swelling of the thyroid.   Lymphatic: No lymphadenopathy noted.   Musculoskeletal: Lower extremities likely bilateral cellulitis likely secondary to significant edema experience  Neurologic: Alert & oriented x 3, cranial nerves II through XII are intact, Normal motor function, Normal sensory function, No focal deficits noted.   Psychiatric: Affect normal, Judgment normal, Mood normal.     VITAL SIGNS:/80   Pulse 78   Temp 36.4 °C (97.5 °F)   Resp 14   Ht 1.829 m (6')   Wt 67.5 kg (148 lb 12.8 oz)   SpO2 98%   BMI 20.18 kg/m²     Labs: Reviewed    Assessment:                                                     Plan:    1. Chronic kidney disease, stage 3b ([facility])  Continue furosemide recheck comprehensive metabolic panel CBC  - furosemide (LASIX) 40 MG Tab; Take 1 Tablet by mouth every day for 90  days.  Dispense: 30 Tablet; Refill: 2  - potassium chloride SA (KDUR) 20 MEQ Tab CR; Take 1 Tablet by mouth 2 times a day.  Dispense: 60 Tablet; Refill: 2  - Comp Metabolic Panel; Future  - CBC WITH DIFFERENTIAL; Future    2. Edema due to malnutrition, due to unspecified malnutrition type (HCC)  Continue Lasix and potassium  - furosemide (LASIX) 40 MG Tab; Take 1 Tablet by mouth every day for 90 days.  Dispense: 30 Tablet; Refill: 2  - potassium chloride SA (KDUR) 20 MEQ Tab CR; Take 1 Tablet by mouth 2 times a day.  Dispense: 60 Tablet; Refill: 2  - Comp Metabolic Panel; Future  - CBC WITH DIFFERENTIAL; Future    3. Achalasia  Referral written to Dr. Prieto at [facility]  - Referral to General Surgery  - Comp Metabolic Panel; Future  - CBC WITH DIFFERENTIAL; Future    4. Nausea and vomiting, unspecified vomiting type  Start scopolamine patch  - scopolamine (TRANSDERM SCOP, 1.5 MG,) 1 mg/72hr PATCH 72 HR; Place 1 Patch on the skin every 72 hours.  Dispense: 4 Patch; Refill: 3  - Comp Metabolic Panel; Future  - CBC WITH DIFFERENTIAL; Future    5. Cellulitis of lower extremity, unspecified laterality  Continue Keflex  - cephALEXin (KEFLEX) 500 MG Cap; Take 1 Capsule by mouth 4 times a day.  Dispense: 28 Capsule; Refill: 0  - Comp Metabolic Panel; Future    Patient has returned hydrocodone 5/325 mg tablets #20 destroyed Chase Charles, DO  Also valproic acid liquid also was destroyed

## 2023-11-27 ASSESSMENT — RHEUMATOLOGY FOLLOW-UP QUESTIONNAIRE
SUNLIGHT-INDUCED SKIN RASH: NECK
SINUS PAIN: Y
JOINT SWELLING: Y
UNINTENTIONAL WEIGHT LOSS: 10-20 LBS
DRY MOUTH: Y
LYMPH NODE SWELLING: JAWS
SUNLIGHT-INDUCED SKIN RASH: FACE
JOINT INSTABILITY: Y
LYMPH NODE SWELLING: NECK
THYROID SWELLING: Y
EAR PAIN: Y
LYMPH NODE SWELLING: GROIN
DRY EYES: Y
HEADACHES: Y
SUNLIGHT-INDUCED SKIN RASH: CHEST
ABDOMINAL PAIN: Y
DIFFICULTY SWALLOWING: Y
RINGING IN EARS: Y
NAUSEA: Y
COLD-INDUCED COLOR CHANGES (WHITE, PURPLE, RED ON REWARMING): TOES
DURATION: >1 HOUR
STICKING IN THROAT: Y
WEAKNESS: Y
SUNLIGHT-INDUCED SKIN RASH: LEGS
VERTIGO: Y
COLD-INDUCED COLOR CHANGES (WHITE, PURPLE, RED ON REWARMING): NOSE
EYE PAIN: Y
HAIR SHEDDING: Y
SUNLIGHT-INDUCED SKIN RASH: ARMS
COLD-INDUCED COLOR CHANGES (WHITE, PURPLE, RED ON REWARMING): FINGERS
RATE_1TO10: 7
SPASMS: Y
NASAL CONGESTION: Y
TINGLING: Y
BLURRY VISION: Y
TENDON PAIN: Y
DIZZINESS: Y
BODY ACHES: Y
SKIN THICKENING: Y
VISION LOSS: Y
CHILLS: Y
VOMITING: Y
JOINT PAIN: BETTER WITH ACTIVITY
CHARACTERISTIC: BETTER WITH ACTIVITY
NUMBNESS: Y
MARK ALL THE AREAS OF PAIN: 93774653
FATIGUE: Y
THYROID PAIN: Y
MUSCLE PAIN: Y
BURNING: Y

## 2023-11-29 ENCOUNTER — OFFICE VISIT (OUTPATIENT)
Dept: RHEUMATOLOGY | Facility: MEDICAL CENTER | Age: 68
End: 2023-11-29
Attending: STUDENT IN AN ORGANIZED HEALTH CARE EDUCATION/TRAINING PROGRAM
Payer: MEDICARE

## 2023-11-29 VITALS
WEIGHT: 152 LBS | DIASTOLIC BLOOD PRESSURE: 60 MMHG | OXYGEN SATURATION: 93 % | BODY MASS INDEX: 20.59 KG/M2 | HEIGHT: 72 IN | TEMPERATURE: 96.7 F | SYSTOLIC BLOOD PRESSURE: 122 MMHG | HEART RATE: 75 BPM

## 2023-11-29 DIAGNOSIS — M79.7 FIBROMYALGIA SYNDROME: ICD-10-CM

## 2023-11-29 DIAGNOSIS — L93.2 DRUG-INDUCED LUPUS ERYTHEMATOSUS: ICD-10-CM

## 2023-11-29 DIAGNOSIS — Z79.899 LONG-TERM USE OF HYDROXYCHLOROQUINE: ICD-10-CM

## 2023-11-29 DIAGNOSIS — T50.905A DRUG-INDUCED LUPUS ERYTHEMATOSUS: ICD-10-CM

## 2023-11-29 PROCEDURE — 3078F DIAST BP <80 MM HG: CPT | Performed by: STUDENT IN AN ORGANIZED HEALTH CARE EDUCATION/TRAINING PROGRAM

## 2023-11-29 PROCEDURE — 99212 OFFICE O/P EST SF 10 MIN: CPT | Performed by: STUDENT IN AN ORGANIZED HEALTH CARE EDUCATION/TRAINING PROGRAM

## 2023-11-29 PROCEDURE — 3074F SYST BP LT 130 MM HG: CPT | Performed by: STUDENT IN AN ORGANIZED HEALTH CARE EDUCATION/TRAINING PROGRAM

## 2023-11-29 PROCEDURE — 99214 OFFICE O/P EST MOD 30 MIN: CPT | Performed by: STUDENT IN AN ORGANIZED HEALTH CARE EDUCATION/TRAINING PROGRAM

## 2023-11-29 RX ORDER — SPIRONOLACTONE 25 MG/1
25 TABLET ORAL DAILY
COMMUNITY
End: 2023-12-11

## 2023-11-29 RX ORDER — DIAZEPAM 5 MG/1
5 TABLET ORAL EVERY 6 HOURS PRN
COMMUNITY

## 2023-11-29 RX ORDER — HYDROXYCHLOROQUINE SULFATE 200 MG/1
200 TABLET, FILM COATED ORAL DAILY
Qty: 90 TABLET | Refills: 3 | Status: SHIPPED | OUTPATIENT
Start: 2023-11-29

## 2023-11-29 ASSESSMENT — FIBROSIS 4 INDEX: FIB4 SCORE: 2.8

## 2023-11-29 NOTE — PATIENT INSTRUCTIONS
AFTER VISIT INSTRUCTIONS    Below are important information to help you navigate your healthcare needs and help us serve you safely and effectively:  If laboratory tests and/or imaging studies were ordered, remember to go get them done as instructed.  If new prescriptions and/or refills were sent, remember to go pick them up from your local pharmacy, or call the specialty pharmacy to request shipment.  Always take your prescription medications exactly as prescribed unless instructed otherwise.  Note that antirheumatic drugs and steroids are immunosuppressive which means they increase your risk of infections and have multiple potential adverse effects on various organ systems in your body, though most of them are uncommon.  It is important that you are up-to-date on age-appropriate immunizations, particularly shingles and bacterial/viral pneumonia vaccines, which you can request from me or your primary care provider.  Be sure to read the drug package inserts to learn about the potential side effects of your medications before you start taking them.  If you experience any significant drug side effects, stop taking the medication and notify me promptly, and depending on the severity of the side effects, consider going to an urgent care or emergency department for immediate attention.  If there are significant findings on your lab tests and imaging studies that warrant further action, I will notify you with explanations via "[employer]hart or phone call, otherwise you can view them on [employer] and let me know if you have any questions.  Note that [employer] messages are typically read during office hours and may take 1-7 business days before a response depending on the urgency of the situation and how busy my clinic schedule is.  In general, [employer] messaging is for non-urgent matters that do not require immediate attention, so for urgent matters that cannot wait, you are advised to go to an urgent care.  Lastly, you are granted  [place] access to my documentation of your visit and are encouraged to read my note which details my assessment and plan for your condition.  To learn more about your condition and rheumatic diseases evaluated and treated by rheumatologists, as well as gain access to many helpful resources about these diseases, visit our website at [email or web address].

## 2023-11-29 NOTE — PROGRESS NOTES
[facility] RHEUMATOLOGY  [address], [address], [place] [address]  Phone: [phone] ? Fax: [phone]  [facility]/Health-Services/Rheumatology    FOLLOW-UP VISIT NOTE      DATE OF SERVICE: 11/29/2023         Subjective     PRIMARY CARE PRACTITIONER:  Chase Charles D.O.  [address]  [place] [address]    PATIENT IDENTIFICATION:  Jana Overton  [id number] [place] [address]    YOB: 1955    MEDICAL RECORD NUMBER: [id number]          CHIEF COMPLAINT:   Chief Complaint   Patient presents with    Follow-Up     Drug-induced lupus erythematosus       RHEUMATOLOGIC HISTORY:  Jana Overton is a 68 y.o. female with pertinent history notable for drug-induced lupus diagnosed in 7/2022 (presumably due to her past use of hydralazine versus Remicade), Crohn's disease diagnosed in late 1990s/early 2000s, DJD/DDD of cervical and lumbar spines s/p cervical and lumbar fusions, fibromyalgia/chronic pain syndrome since the 1990s (under the care of pain management), and multiple comorbidities. She initially presented on 7/27/22 for evaluation in the setting of positive VAHID, reported widespread joint/muscle pain involving her hands, elbows, shoulders, neck, upper/mid/lower back, knees, ankles, and feet feet. These were associated with all day joint stiffness that improved with activity but her joint/muscle pain tended to worsen with much activity such that she felt exhausted by the end of the day. Additionally reported chronic fatigue with muscle weakness, photosensitive rash on face/extremities, Raynaud's phenomenon on fingers/toes, dry eyes with redness/pain, dry mouth with episodic ulcers, and numerous nonspecific symptoms from multiple organ systems. She noted that she had been trying to manage with natural/alternative medicines to avoid needing to take immunosuppressive medications but experienced modest relief.     Pertinent treatments: Remicade for Crohn's disease (last infusion in  early 2000s), Neurontin and Lyrica (neither of which was very helpful), hydroxychloroquine 300>200 mg daily (started 8/2022, dose decreased 11/29/23-present).     Pertinent lab results history: Positive VAHID 1:640 homogenous pattern with positive anti-histone 2.9 and anti-chromatin 24 (in 7/2022); positive IgA anti-CL 24 and IgG anti-CL 19 with negative IgM anti-CL (in 10/2021); elevated ESR 29 with normal CRP 0.3 (in 11/2023).    Pertinent negative labs: Negative ANCA (in 10/2021), RF, anti-CCP, HLA-B27, anti-TPO, anti-TG, CH50, CPK, TSH, and vitamin D (as of 7/2022), and LFTs (in 2/2023), C3, C4, (in 11/2023).     Pertinent XR imaging: Hands (in 7/2022) with mild osteoarthritis of PIP and DIP joints, but no evidence of inflammatory arthropathy.      INTERVAL HISTORY:  Reports interval history as noted on the questionnaire below or scanned under media tab.  [facility] Rheumatology Established Patient History Form    11/27/2023 10:51 AM PST - Filed by Patient   MAIN REASON FOR VISIT Follow up. Joints were very flared and painful. Getting better since antibiotic was started and new water pill and potassium. Fingers, knees, feet and back, neck joints inflamed last month.   INTERVAL HISTORY OF ILLNESS   Date of worsening onset: Fall. Was told to take gabapenta which started a body flare and then weather changed. I couldn’t stop throwing up, losing weight. Swallowing painful. Better today.   Preceding incident/ailment: Stiffness, legs giving out and shaking in my hands. Going in and out of AFIB and getting dehydrated.   Describe/list your symptoms: Aching, hot/cold, nausea, pins, needles, and BURNING in my feet and hands. Shoulder pain going from shoulder to head down arm and leg.   Exacerbating factors: [facility] and the so called staff. Not all but the majority of hospitalist and nurses SHAME on you for terrible conditions and care.   Alleviating factors: I have more then one medical condition and my  RIGHTS as a human being have been violated by [facility] continuously when going to Er or hospital.   Helpful medications: See list. Staying home and swimming or water aerobics everyday. Eating my organic food including [place], pheasant, chuckers, trout, tuna, crab all caught by us. I do poorly in a institution that limits even getting out of your bed!!   Ineffective medications: See list your next question isn’t reflecting going from 4 (good) to 9 sometimes a TEN which wnds me in the ER/hospital   Severity of pain (scale of 1-10): 7   Personal/emotional stressors: [facility], computers, phones. I like to live peaceful with nature and enjoy my time with my  not locked away at the hell hole called [facility].   James All The Areas Of Pain    REVIEW OF SYMPTOMS    General   Fevers    Chills Yes   Night sweats    Malaise    Fatigue Yes   Unintentional weight loss 10-20 lbs   Musculoskeletal   Joint pain Better with activity   Morning stiffness duration >1 hour   Morning stiffness characteristic Better with activity   Joint swelling Yes   Joint instability Yes   Tendon pain Yes   Muscle pain Yes   Body aches Yes   Dermatologic   Hair loss with bald spots    Hair shedding Yes   Skin thickening Yes   Skin plaques    Sunlight-induced skin rash Face;  Neck;  Chest;  Arms;  Legs   Cold-induced color changes (white, purple, red on rewarming) Fingers;  Toes;  Nose   Neurologic/Psychiatric   Weakness Yes   Spasms Yes   Tingling Yes   Burning Yes   Numbness Yes   Insomnia    Anxiety    Depression    Head/Eyes   Headaches Yes   Temple pain    Dizziness Yes   Dry eyes Yes   Eye pain Yes   Eye redness    Blurry vision Yes   Vision loss Yes   Ears/Nose   Ear pain Yes   Ringing in ears Yes   Vertigo Yes   Hearing loss    Nasal ulcers    Nosebleeds    Sinus pain Yes   Nasal congestion Yes   Snoring    Mouth/Throat   Oral ulcers    Bleeding gums    Dry mouth Yes   Cavities    Sore throat    Sticking in throat Yes   Difficulty speaking     Neck/Lymphatics   Thyroid pain Yes   Thyroid swelling Yes   Lymph node swelling Jaws;  Neck;  Groin   Cardiac/Respiratory   Chest pain with breathing    Dry cough    Cough with bloody phlegm    Shortness of breath    Fast heartbeats    Irregular heartbeats    Gastrointestinal   Nausea Yes   Vomiting Yes   Difficulty swallowing Yes   Heartburn    Abdominal pain Yes   Bloody stool    Mucus stool    Genitourinary   Pelvic pain    Genital ulcers    Abnormal discharge    Burning urination    Frothy urine    Blood in urine      REVIEW OF SYSTEMS:  Except as noted in the history above, relevant review of systems with emphasis on autoimmune rheumatic diseases was otherwise negative.      ACTIVE PROBLEM LIST:  Patient Active Problem List    Diagnosis Date Noted    Chronic kidney disease, stage 3b ([facility]) 11/22/2023    Transient alteration of awareness 10/26/2023    Achalasia 10/18/2023    Esophageal candidiasis ([facility]) 02/08/2023    Sacroiliac joint pain 11/10/2022    Esophageal stricture 10/22/2022    Dysphagia 10/21/2022    Drug-induced lupus erythematosus 09/27/2022    Long-term use of hydroxychloroquine 09/27/2022    Fibromyalgia syndrome 09/27/2022    Positive cardiolipin antibodies (IgA and IgG anti-CL) 07/27/2022    Positive VAHID (1:640 homogenous pattern with negative reflex) 07/27/2022    Inflammatory arthritis 07/27/2022    Mild tetrahydrocannabinol (THC) abuse 06/02/2022    Oropharyngeal dysphagia 06/02/2022    Acute pancreatitis 05/23/2022    Migraine 06/04/2019    FLORES (acute kidney injury) ([facility]) 06/01/2019    Essential hypertension 05/20/2019    SIRS (systemic inflammatory response syndrome) ([facility]) 05/19/2019    History of MRSA infection 12/29/2018    History of infection with vancomycin resistant Enterococcus (VRE) 12/29/2018    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[facility]) 12/28/2018    Anemia 12/15/2018    Hyponatremia 12/12/2018    Leukocytosis 12/12/2018    Anxiety disorder due to multiple medical problems 12/01/2017    DDD  (degenerative disc disease), lumbar 04/12/2017    History of DVT (deep vein thrombosis) 11/23/2016    Atrial fibrillation ([facility]) 03/26/2015    Sleep apnea 10/26/2014    Postherpetic neuralgia 06/24/2014    Multiple falls 06/24/2014    COPD (chronic obstructive pulmonary disease) ([facility]) 06/24/2014    Rosacea 06/24/2014    Ileostomy in place ([facility]) 06/26/2013    GERD (gastroesophageal reflux disease) 12/05/2012    Status post lumbar surgery 07/16/2012    Crohn's disease of small intestine with complication ([facility]) 09/23/2009    Central sensitization to pain 09/23/2009    Opioid type dependence, continuous ([facility]) 09/23/2009    Degenerative joint disease of cervical and lumbar spine 09/23/2009       PAST MEDICAL HISTORY:  Past Medical History:   Diagnosis Date    Anesthesia     Difficult IV stick    Anxiety     Arthritis     all over    ASTHMA     Atrial fib/flut     Backpain     Bronchitis     5 years    Chronic pain     Colostomy in place ([facility])     COPD (chronic obstructive pulmonary disease) ([facility])     Crohn's disease of colon ([facility])     Dyspnea     History of cardiac murmur     Ileostomy present ([facility])     Infectious disease     MRSA, VancoRSA    Multiple falls     Narcotic dependence ([facility])     Obstruction     Pain     Pneumonia     child and mid 30's    Postherpetic neuralgia     Rosacea     Sleep apnea        PAST SURGICAL HISTORY:  Past Surgical History:   Procedure Laterality Date    MT UPPER GI ENDOSCOPY,DIAGNOSIS N/A 2/8/2023    Procedure: GASTROSCOPY;  Surgeon: Jamarcus Davalos M.D.;  Location: SURGERY SAME DAY [facility];  Service: Gastroenterology    MT UPPER GI ENDOSCOPY,W/DILAT,GASTRIC OUT N/A 2/8/2023    Procedure: GASTROSCOPY, WITH BALLOON DILATION;  Surgeon: Jamarcus Daavlos M.D.;  Location: SURGERY SAME DAY [facility];  Service: Gastroenterology    MT UPPER GI ENDOSCOPY,BIOPSY N/A 2/8/2023    Procedure: GASTROSCOPY, WITH BIOPSY;  Surgeon: Jamarcus Davalos M.D.;  Location: SURGERY SAME DAY [facility];  Service:  Gastroenterology    OR UPPER GI ENDOSCOPY,DIAGNOSIS N/A 1/16/2023    Procedure: GASTROSCOPY;  Surgeon: Jamarcus Davalos M.D.;  Location: SURGERY SAME DAY [facility];  Service: Gastroenterology    OR UPPER GI ENDOSCOPY,W/DILAT,GASTRIC OUT N/A 1/16/2023    Procedure: GASTROSCOPY, WITH BALLOON DILATION;  Surgeon: Jamarcus Davalos M.D.;  Location: SURGERY SAME DAY [facility];  Service: Gastroenterology    OR UPPER GI ENDOSCOPY,BIOPSY N/A 1/16/2023    Procedure: GASTROSCOPY, WITH BIOPSY;  Surgeon: Jamarcus Davalos M.D.;  Location: SURGERY SAME DAY [facility];  Service: Gastroenterology    OR UPPER GI ENDOSCOPY,DIAGNOSIS N/A 10/24/2022    Procedure: GASTROSCOPY;  Surgeon: Jamarcus Davalos M.D.;  Location: SURGERY SAME DAY [facility];  Service: Gastroenterology    BILIARY DILATATION N/A 10/24/2022    Procedure: DILATION, STRICTURE, BILE DUCT;  Surgeon: Jamarcus Davalos M.D.;  Location: SURGERY SAME DAY [facility];  Service: Gastroenterology    OR CYSTOSCOPY,INSERT URETERAL STENT Right 12/23/2021    Procedure: CYSTOSCOPY, WITH URETERAL STENT EXCHANGE;  Surgeon: Jonathan Turcios M.D.;  Location: [facility];  Service: Urology    OR CYSTO/URETERO/PYELOSCOPY, DX Right 12/23/2021    Procedure: URETEROSCOPY;  Surgeon: Jonathan Turcios M.D.;  Location: [facility];  Service: Urology    OR CYSTO/URETERO/PYELOSCOPY, DX Right 12/8/2021    Procedure: URETEROSCOPY;  Surgeon: Luc Hook M.D.;  Location: [facility];  Service: Urology    CYSTO STENT PLACEMNT PRE SURG Right 12/8/2021    Procedure: CYSTOSCOPY, WITH URETERAL STENT INSERTION;  Surgeon: Luc Hook M.D.;  Location: [facility];  Service: Urology    ILEOSTOMY  1/20/2021    Procedure: ILEOSTOMY- COMPLEX REVISION,;  Surgeon: Kevin Cruz M.D.;  Location: [facility];  Service: General    LYSIS ADHESIONS GENERAL  1/20/2021    Procedure: LYSIS, ADHESIONS;  Surgeon: Kevin Cruz M.D.;  Location: SURGERY [facility];  Service: General     GASTROSCOPY N/A 5/22/2019    Procedure: GASTROSCOPY - WITH DILATION;  Surgeon: Dionicio Cardona M.D.;  Location: SURGERY [facility];  Service: Gastroenterology    GASTROSCOPY  1/6/2019    Procedure: GASTROSCOPY- WITH DILATION;  Surgeon: Daniel Daniels M.D.;  Location: SURGERY [facility];  Service: Gastroenterology    ILEOSTOMY  12/29/2018    Procedure: ILEOSTOMY- REVISION;  Surgeon: Kevin Cruz M.D.;  Location: SURGERY [facility];  Service: General    SIGMOIDOSCOPY FLEX  12/29/2018    Procedure: SIGMOIDOSCOPY FLEX;  Surgeon: Kevin Cruz M.D.;  Location: SURGERY [facility];  Service: General    EXPLORATORY LAPAROTOMY  12/29/2018    Procedure: EXPLORATORY LAPAROTOMY, lysis of adhesions;  Surgeon: Kevin Cruz M.D.;  Location: [facility];  Service: General    ILEOSTOMY  5/14/2014    Performed by Kevin Cruz M.D. at SURGERY [facility] ORS    MAMMOPLASTY REDUCTION  7/17/2013    Performed by Janey Burt M.D. at SURGERY [facility] ORS    HARDWARE REMOVAL NEURO  7/16/2012    Performed by KEELEY KIM at SURGERY [facility]    GASTROSCOPY  10/4/2011    Performed by TYSON MARTINEZ at SURGERY SAME DAY [facility] ORS    COLONOSCOPY  10/4/2011    Performed by TYSON MARTINEZ at SURGERY SAME DAY [facility] ORS    DILATION AND CURETTAGE  9/24/2010    Performed by GAURAV ALY at SURGERY SAME DAY [facility] ORS    ILEOSTOMY  11/11/2009    Performed by KEVIN CRUZ at SURGERY [facility]    GASTROSCOPY WITH BIOPSY  11/22/08    Performed by NEGRITA HUYNH at SURGERY [facility]    ABDOMINAL EXPLORATION      CERVICAL DISK AND FUSION ANTERIOR      COLON RESECTION      FOOT SURGERY      HAND SURGERY      LUMBAR FUSION ANTERIOR      LUMPECTOMY      OTHER ABDOMINAL SURGERY      surgery for chrons disease    MI BREAST REDUCTION         MEDICATIONS:  Current Outpatient Medications   Medication Sig    spironolactone (ALDACTONE) 25 MG Tab Take 25 mg by mouth  every day.    diazePAM (VALIUM) 5 MG Tab Take 5 mg by mouth every 6 hours as needed for Anxiety.    hydroxychloroquine (PLAQUENIL) 200 MG Tab Take 1 Tablet by mouth every day.    furosemide (LASIX) 40 MG Tab Take 1 Tablet by mouth every day for 90 days.    potassium chloride SA (KDUR) 20 MEQ Tab CR Take 1 Tablet by mouth 2 times a day.    cephALEXin (KEFLEX) 500 MG Cap Take 1 Capsule by mouth 4 times a day.    Azelastine (ASTELIN) 137 MCG/SPRAY Solution Administer 1 Spray into each nostril at bedtime.    fluticasone (FLONASE) 50 MCG/ACT nasal spray Administer 1 Spray into each nostril at bedtime.    prochlorperazine (COMPAZINE) 10 MG Tab Take 1 Tablet by mouth every 6 hours as needed for Nausea/Vomiting.    oxyCODONE immediate release (ROXICODONE) 10 MG immediate release tablet Take 1 Tablet by mouth every 6 hours as needed for Severe Pain (Refill #3 of #3.) for up to 30 days.    traZODone (DESYREL) 50 MG Tab Take 1 Tablet by mouth every evening.    ondansetron (ZOFRAN ODT) 4 MG TABLET DISPERSIBLE Take 1 Tablet by mouth every 6 hours as needed for Nausea/Vomiting.    granisetron (KYTRIL) 1 MG Tab Take 1 Tablet by mouth every 12 hours as needed for Nausea/Vomiting.    metoprolol tartrate (LOPRESSOR) 50 MG Tab Take 1 Tablet by mouth 2 times a day.    levalbuterol (XOPENEX HFA) 45 MCG/ACT inhaler Inhale 2 Puffs every four hours as needed for Shortness of Breath (As needed for shortness of breath, cough, wheezing.).    Multiple Vitamins-Minerals (CENTRUM SILVER 50+WOMEN) Tab Take 1 Tablet by mouth 2 times a day. GUMMIES.    Ascorbic Acid (VITAMIN C PO) Take 1 Tablet by mouth 2 times a day.    Biotin w/ Vitamins C & E (HAIR SKIN & NAILS GUMMIES PO) Take 1 Tablet by mouth 2 times a day.    naratriptan (AMERGE) 2.5 MG tablet Take 1 Tablet by mouth as needed for Migraine.    scopolamine (TRANSDERM SCOP, 1.5 MG,) 1 mg/72hr PATCH 72 HR Place 1 Patch on the skin every 72 hours.    nystatin (MYCOSTATIN) 186473 UNIT/ML  "Suspension Take 5 mL by mouth 4 times a day.    ZINC PICOLINATE PO Take 1 Tablet by mouth 2 times a day.       ALLERGIES:   Allergies   Allergen Reactions    Demerol Hcl [Meperidine] Shortness of Breath and Palpitations    Methotrexate Hives, Shortness of Breath and Rash          Morphine Shortness of Breath and Palpitations    Promethazine Shortness of Breath and Rash          Remicade [Infliximab] Hives, Shortness of Breath and Rash          Sudafed [Pseudoephedrine] Shortness of Breath, Vomiting and Palpitations    Azathioprine Sodium Hives and Shortness of Breath     10/21/2022 - patient unable to verify allergy/reaction  Historical reaction listed: Hives, Shortness of Breath    Carafate [Sucralfate] Vomiting and Nausea     + Mouth Sores    Other Drug Unspecified     \"STEROIDS\" - REACTION NOT SPECIFIED    Sulfa Drugs Rash          Sulfasalazine Rash          Gabapentin Cough, Hives, Itching, Nausea, Palpitations, Photosensitivity, Rash, Runny Nose, Swelling, Unspecified and Vomiting    Amitriptyline Unspecified     Nightmares      Ativan [Lorazepam] Unspecified     Nightmares    Betamethasone Unspecified     10/21/2022 - patient unable to verify allergy/reaction  No historical reaction listed    Fentanyl Unspecified     \"Patches didn't work\" and skin broke down    Lyrica [Pregabalin] Nausea          Methadone Unspecified     \"Didn't work\"    Potassium Unspecified     Notes: In IV only  REACTION NOT SPECIFIED    Tizanidine Unspecified     10/21/2022 - patient unable to verify allergy/reaction  No historical reaction listed       IMMUNIZATIONS:  Immunization History   Administered Date(s) Administered    INFLUENZA TIV (IM) 09/17/2007, 10/06/2012, 11/16/2013, 09/27/2015    Influenza (IM) Preservative Free - HISTORICAL DATA 12/17/2010, 09/27/2015    Influenza Seasonal Injectable - Historical Data 10/06/2012, 11/16/2013    Influenza Vac Subunit Quad Inj (Pf) 10/10/2017    Influenza Vaccine Adult HD 11/12/2018, " 09/23/2020, 10/11/2021, 10/07/2022, 10/05/2023    Influenza Vaccine Quad Inj (Pf) 10/24/2014, 10/04/2017    Influenza Vaccine Quad Inj (Preserved) 09/21/2016, 10/11/2019    Influenza Vaccine-Flucelvax - HISTORICAL DATA 01/06/2014    PFIZER BIVALENT SARS-COV-2 VACCINE (12+) 10/07/2022    PFIZER DELATORRE CAP SARS-COV-2 VACCINATION (12+) 05/12/2022    PFIZER PURPLE CAP SARS-COV-2 VACCINATION (12+) 03/17/2021, 04/08/2021, 10/29/2021    Pneumococcal Conjugate Vaccine (Prevnar/PCV-13) 11/12/2018    Pneumococcal Vaccine (UF) - HISTORICAL DATA 10/17/2007    Pneumococcal polysaccharide vaccine (PPSV-23) 10/06/2012    Tdap Vaccine 12/04/2012, 11/16/2013    ZZZInfluenza Vaccine Pediatric Preserv Free 09/29/2009    Zoster Vaccine Live (ZVL) (Zostavax) - HISTORICAL DATA 11/01/2014       SOCIAL HISTORY:   Social History     Socioeconomic History    Marital status:     Highest education level: Associate degree: academic program   Tobacco Use    Smoking status: Never    Smokeless tobacco: Never    Tobacco comments:     second hand smoke parents - smoked for only 1 year many years ago   Vaping Use    Vaping Use: Every day    Substances: THC   Substance and Sexual Activity    Alcohol use: Not Currently     Alcohol/week: 0.6 oz     Types: 1 Shots of liquor per week     Comment: gin and half a lime; tonic water    Drug use: Yes     Types: Marijuana, Inhaled     Comment: medical marijuana through bong/vape     Social Determinants of Health     Financial Resource Strain: Unknown (1/15/2023)    Overall Financial Resource Strain (CARDIA)     Difficulty of Paying Living Expenses: Patient refused   Food Insecurity: Unknown (1/15/2023)    Hunger Vital Sign     Worried About Running Out of Food in the Last Year: Patient refused     Ran Out of Food in the Last Year: Patient refused   Transportation Needs: Unknown (1/15/2023)    PRAPARE - Transportation     Lack of Transportation (Medical): Patient refused     Lack of Transportation  (Non-Medical): Patient refused   Physical Activity: Sufficiently Active (1/15/2023)    Exercise Vital Sign     Days of Exercise per Week: 7 days     Minutes of Exercise per Session: 60 min   Stress: No Stress Concern Present (11/4/2021)    [employer] [place] [employer] - Occupational Stress Questionnaire     Feeling of Stress : Not at all   Social Connections: Socially Integrated (1/15/2023)    Social Connection and Isolation Panel [NHANES]     Frequency of Communication with Friends and Family: Three times a week     Frequency of Social Gatherings with Friends and Family: Once a week     Attends [employer] Services: More than 4 times per year     Active Member of Clubs or Organizations: Yes     Attends Club or Organization Meetings: More than 4 times per year     Marital Status:    Housing Stability: Unknown (1/15/2023)    Housing Stability Vital Sign     Unable to Pay for Housing in the Last Year: Patient refused     Number of Places Lived in the Last Year: 1     Unstable Housing in the Last Year: No       FAMILY HISTORY:  Family History   Problem Relation Age of Onset    Lung Disease Mother         copd    Diabetes Father     Heart Disease Father     Diabetes Sister     Cancer Maternal Aunt 40        breast            Objective     Vital Signs: /60 (BP Location: Left arm, Patient Position: Sitting, BP Cuff Size: Adult)   Pulse 75   Temp 35.9 °C (96.7 °F) (Temporal)   Ht 1.829 m (6')   Wt 68.9 kg (152 lb)   SpO2 93% Body mass index is 20.61 kg/m².    General: Appears well and comfortable  Eyes: No scleral or conjunctival lesions  ENT: No apparent oral or nasal lesions  Head/Neck: No apparent scalp or neck lesions  Cardiovascular: Regular rate and rhythm  Respiratory: Breathing quiet and unlabored  Gastrointestinal: No apparent organomegaly or abdominal masses  Integumentary: Minimal malar rash on face; multiple tiny erythematous papules on upper and lower extremities  Musculoskeletal:  Poorly localized mild to moderate tenderness of hands, wrists, elbows, shoulders, knees, ankles, feet, and muscle groups of the chest wall, upper/lower extremities, neck/upper and mid/lower back; overall range of motion relatively limited by pain  Neurologic: No focal sensory or motor deficits  Psychiatric: Mood and affect appropriate      LABORATORY RESULTS REVIEWED AND INTERPRETED BY ME:  Lab Results   Component Value Date/Time    CREACTPROT <0.30 11/17/2023 10:15 AM    SEDRATEWES 29 (H) 11/17/2023 10:15 AM    URICACID 7.3 12/06/2022 04:51 PM     Lab Results   Component Value Date/Time    I2KDICMTUFL 157.7 11/17/2023 10:15 AM    G2XMIFIYVGA 48.0 11/17/2023 10:15 AM     Lab Results   Component Value Date/Time    RHEUMFACTN 10 10/24/2023 04:42 PM    CCPANTIBODY 8 07/28/2022 04:31 PM    BBBT51CHAA Negative 07/28/2022 04:31 PM     Lab Results   Component Value Date/Time    ANTINUCAB Detected (A) 10/24/2023 04:42 PM    ANADIRECT Negative 10/20/2010 11:00 AM    ANAPATTERN <1:40 01/07/2009 06:15 PM     Lab Results   Component Value Date/Time    ANTIDNADS SEE BELOW 10/24/2023 04:42 PM    SMITHAB 1 10/24/2023 04:42 PM    RNPAB 4 10/24/2023 04:42 PM    NSBNWGO60 1 10/24/2023 04:42 PM    SSA60 1 10/24/2023 04:42 PM    SSA52 0 10/24/2023 04:42 PM    ANTISSBSJ 0 10/24/2023 04:42 PM    JO1AB 1 10/24/2023 04:42 PM     Lab Results   Component Value Date/Time    IGACARDIOLI 24 (H) 10/26/2021 07:49 AM    IGMCARDIOLI <10 10/26/2021 07:49 AM    IGGCARDIOLI 19 (H) 10/26/2021 07:49 AM     Lab Results   Component Value Date/Time     (H) 11/01/2021 07:53 AM    IGG 1169 11/01/2021 07:53 AM     Lab Results   Component Value Date/Time    ANTIMITOCHO 3.5 07/22/2016 12:41 PM    FACTIN 11 07/22/2016 12:41 PM     Lab Results   Component Value Date/Time    MICROSOMALA <9.0 07/28/2022 04:31 PM    ANTITHYROGL <0.9 07/28/2022 04:31 PM    TSH 0.922 10/20/2010 11:00 AM    TSHULTRASEN 0.360 (L) 10/17/2023 08:00 AM    FREEDIR 1.28 10/20/2010  11:00 AM    FREET4 1.54 10/17/2023 03:12 PM     Lab Results   Component Value Date/Time    CPKTOTAL 41 05/05/2022 09:43 AM    ALDOLASE 4.2 01/21/2008 03:43 AM    MYOGLOBIN 73 07/17/2008 03:48 PM     Lab Results   Component Value Date/Time    25HYDROXY 40 07/14/2022 11:23 AM    PTHINTACT 52.3 12/06/2022 04:51 PM     Lab Results   Component Value Date/Time    FERRITIN 144.0 05/07/2020 01:34 PM    IRON 144 05/07/2020 01:34 PM    FOLATE 16.4 04/28/2021 07:59 AM    PROTHROMBTM 14.9 (H) 10/24/2022 01:19 AM    INR 1.19 (H) 10/24/2022 01:19 AM     Lab Results   Component Value Date/Time    WBC 4.5 (L) 10/28/2023 04:27 AM    WBC 7.9 02/10/2010 04:04 PM    RBC 4.05 (L) 10/28/2023 04:27 AM    RBC 4.86 02/10/2010 04:04 PM    HEMOGLOBIN 12.6 10/28/2023 04:27 AM    HEMATOCRIT 38.2 10/28/2023 04:27 AM    MCV 94.3 10/28/2023 04:27 AM    MCV 86 02/10/2010 04:04 PM    MCH 31.1 10/28/2023 04:27 AM    MCH 27.8 02/10/2010 04:04 PM    MCHC 33.0 10/28/2023 04:27 AM    RDW 43.8 10/28/2023 04:27 AM    RDW 14.6 02/10/2010 04:04 PM    PLATELETCT 151 (L) 10/28/2023 04:27 AM    MPV 10.9 10/28/2023 04:27 AM    NEUTS 4.02 10/25/2023 01:47 PM    NEUTS 4.8 02/10/2010 04:04 PM    POLYS 53 01/28/2021 02:40 PM    LYMPHOCYTES 22.20 10/25/2023 01:47 PM    MONOCYTES 9.80 10/25/2023 01:47 PM    EOSINOPHILS 0.70 10/25/2023 01:47 PM    EOSINOPHILS 34 01/28/2021 02:40 PM    BASOPHILS 0.70 10/25/2023 01:47 PM    HYPOCHROMIA 1+ 03/15/2014 10:02 AM     Lab Results   Component Value Date/Time    ASTSGOT 27 11/17/2023 10:34 AM    ALTSGPT 20 11/17/2023 10:34 AM    ALKPHOSPHAT 96 11/17/2023 10:34 AM    TBILIRUBIN 0.5 11/17/2023 10:34 AM    TOTPROTEIN 7.5 11/17/2023 10:34 AM    TOTPROTEIN 7.1 11/01/2021 07:53 AM    ALBUMIN 4.3 11/17/2023 10:34 AM    ALBUMIN 3.83 11/01/2021 07:53 AM     Lab Results   Component Value Date/Time    SODIUM 140 11/17/2023 10:34 AM    POTASSIUM 3.8 11/17/2023 10:34 AM    CHLORIDE 101 11/17/2023 10:34 AM    CO2 28 11/17/2023 10:34 AM     GLUCOSE 85 11/17/2023 10:34 AM    BUN 21 11/17/2023 10:34 AM    CREATININE 1.11 11/17/2023 10:34 AM    CREATININE 0.89 02/10/2010 04:04 PM    BUNCREATRAT 17 02/10/2010 04:04 PM    GLOMRATE >59 02/10/2010 04:04 PM    CALCIUM 9.8 11/17/2023 10:34 AM    MAGNESIUM 1.8 10/27/2023 06:17 AM     Lab Results   Component Value Date/Time    TOTALVOLUME 16 08/19/2023 08:00 AM    TOTPROTUR 6 10/14/2021 05:00 AM    GZUHMWGC90 30 (L) 10/14/2021 05:00 AM    CREATININEU 1022 08/19/2023 08:00 AM     Lab Results   Component Value Date/Time    COLORURINE Yellow 10/13/2023 12:13 AM    SPECGRAVITY 1.022 10/13/2023 12:13 AM    PHURINE 5.0 10/13/2023 12:13 AM    GLUCOSEUR Negative 10/13/2023 12:13 AM    KETONES Trace (A) 10/13/2023 12:13 AM    PROTEINURIN 300 (A) 10/13/2023 12:13 AM     Lab Results   Component Value Date/Time    FLTYPE Peritoneal 01/28/2021 02:40 PM     Lab Results   Component Value Date/Time    HEPBSAG Negative 07/22/2016 12:41 PM    HEPBCORIGM Negative 07/22/2016 12:41 PM    HEPCAB Negative 07/22/2016 12:41 PM     Lab Results   Component Value Date/Time    CHOLSTRLTOT 194 05/23/2022 02:36 AM    LDL 90 05/23/2022 02:36 AM    HDL 81 05/23/2022 02:36 AM    TRIGLYCERIDE 113 05/23/2022 02:36 AM    HBA1C 5.3 11/17/2023 10:34 AM       RADIOLOGY RESULTS REVIEWED AND INTERPRETED BY ME:  Results for orders placed in visit on 07/27/22    DX-JOINT SURVEY-HANDS SINGLE VIEW    Impression  1. No radiographic evidence of inflammatory arthropathy.    Results for orders placed in visit on 09/24/15    DS-BONE DENSITY STUDY (DEXA)    Impression  According to the World Health Organization classification, bone mineral density of this patient is normal for the lumbar spine and osteopenic for the left femur. Increase in bone density of the lumbar spine since the most recent exam is not statistically  significant. Decrease in bone density in the left femur since the most recent exam is statistically significant.    10-year Probability of  Fracture:  Major Osteoporotic     15.0%  Hip     0.5%  Population      [place] ()    Based on left femur neck BMD      All relevant laboratory and imaging results reported on this note were reviewed and interpreted by me.         Assessment & Plan     Jana Overton is a 68 y.o. female with history as noted above whose presentation merits the following clinical impressions and recommendations:    1. Drug-induced lupus erythematosus  Clinically and serologically low disease activity that appears to be controlled on the current regimen of hydroxychloroquine, but objective assessment is relatively confounded by her fibromyalgia syndrome with widespread musculoskeletal pain. In any case, given the potential for discordance between immunologic activity and clinical disease manifestations, need to occasionally reassess markers of disease activity to gauge overall trajectory in response to treatment. Given relatively stable disease, per her wishes, reasonable to decrease the dose of hydroxychloroquine from 300 mg to 200 mg daily.  - VAHID REFLEXIVE PROFILE; Future  - ANTI-HISTONE ABS; Future  - CHROMATIN AB,IGG; Future  - COMPLEMENT C3; Future  - COMPLEMENT C4; Future  - COMPLEMENT TOTAL (CH50); Future  - CRP QUANTITIVE (NON-CARDIAC); Future  - Sed Rate; Future  - CBC WITH DIFFERENTIAL; Future  - Comp Metabolic Panel; Future  - hydroxychloroquine (PLAQUENIL) 200 MG Tab; Take 1 Tablet by mouth every day.  Dispense: 90 Tablet; Refill: 3    2. Fibromyalgia syndrome  Presumably the most significant etiology of her overall musculoskeletal pain burden which can be managed supportively.  - CBC WITH DIFFERENTIAL; Future  - Comp Metabolic Panel; Future  - Recommend trial of Cymbalta as Neurontin and Lyrica were previously ineffective  - Routine follow up with primary care and pain management    3. Long-term use of hydroxychloroquine  Minimal to no risk of retinal toxicity given the low dose of hydroxychloroquine prescribed  (less than 5 mg/kg of body weight) per rheumatology and ophthalmology guidelines. However, ophthalmologic evaluation is still recommended with the frequency of routine follow-up eye exams determined by the ophthalmologist, typically annually or every other year.  - CBC WITH DIFFERENTIAL; Future  - Comp Metabolic Panel; Future  - Routine ophthalmology evaluation as recommended      The above assessment and plan were discussed with the patient who acknowledged understanding of the plan.    FOLLOW-UP: Return in about 6 months (around 5/29/2024) for Short.         Thank you for the opportunity to participate in the care of Jana Overton.    Petar Lewis MD, MS, FACR  Rheumatologist, [facility] ? [facility]   of Clinical Medicine, Department of Internal Medicine  [facility] ? [facility]

## 2023-11-30 ENCOUNTER — PATIENT MESSAGE (OUTPATIENT)
Dept: MEDICAL GROUP | Facility: LAB | Age: 68
End: 2023-11-30
Payer: MEDICARE

## 2023-11-30 DIAGNOSIS — M47.812 DEGENERATIVE JOINT DISEASE OF CERVICAL AND LUMBAR SPINE: ICD-10-CM

## 2023-11-30 DIAGNOSIS — Z98.890 STATUS POST LUMBAR SURGERY: ICD-10-CM

## 2023-11-30 DIAGNOSIS — M47.816 DEGENERATIVE JOINT DISEASE OF CERVICAL AND LUMBAR SPINE: ICD-10-CM

## 2023-12-11 ENCOUNTER — OFFICE VISIT (OUTPATIENT)
Dept: CARDIOLOGY | Facility: MEDICAL CENTER | Age: 68
End: 2023-12-11
Attending: INTERNAL MEDICINE
Payer: MEDICARE

## 2023-12-11 VITALS
WEIGHT: 144 LBS | DIASTOLIC BLOOD PRESSURE: 50 MMHG | BODY MASS INDEX: 19.5 KG/M2 | OXYGEN SATURATION: 96 % | RESPIRATION RATE: 12 BRPM | SYSTOLIC BLOOD PRESSURE: 108 MMHG | HEART RATE: 74 BPM | HEIGHT: 72 IN

## 2023-12-11 DIAGNOSIS — Z86.718 HISTORY OF DVT (DEEP VEIN THROMBOSIS): ICD-10-CM

## 2023-12-11 DIAGNOSIS — K50.019 CROHN'S DISEASE OF SMALL INTESTINE WITH COMPLICATION (HCC): ICD-10-CM

## 2023-12-11 DIAGNOSIS — I10 ESSENTIAL HYPERTENSION: ICD-10-CM

## 2023-12-11 DIAGNOSIS — Z79.899 LONG-TERM USE OF HYDROXYCHLOROQUINE: ICD-10-CM

## 2023-12-11 DIAGNOSIS — K94.13: ICD-10-CM

## 2023-12-11 DIAGNOSIS — N18.32 CHRONIC KIDNEY DISEASE, STAGE 3B: ICD-10-CM

## 2023-12-11 DIAGNOSIS — I48.91 ATRIAL FIBRILLATION, UNSPECIFIED TYPE (HCC): ICD-10-CM

## 2023-12-11 DIAGNOSIS — I50.32 CHRONIC DIASTOLIC CONGESTIVE HEART FAILURE (HCC): ICD-10-CM

## 2023-12-11 DIAGNOSIS — D69.6 THROMBOCYTOPENIA (HCC): ICD-10-CM

## 2023-12-11 PROCEDURE — 3078F DIAST BP <80 MM HG: CPT | Performed by: INTERNAL MEDICINE

## 2023-12-11 PROCEDURE — 99213 OFFICE O/P EST LOW 20 MIN: CPT | Performed by: INTERNAL MEDICINE

## 2023-12-11 PROCEDURE — 3074F SYST BP LT 130 MM HG: CPT | Performed by: INTERNAL MEDICINE

## 2023-12-11 PROCEDURE — 99214 OFFICE O/P EST MOD 30 MIN: CPT | Performed by: INTERNAL MEDICINE

## 2023-12-11 RX ORDER — FUROSEMIDE 20 MG/1
20 TABLET ORAL DAILY
Qty: 100 TABLET | Refills: 3 | Status: SHIPPED | OUTPATIENT
Start: 2023-12-11 | End: 2025-01-14

## 2023-12-11 RX ORDER — SPIRONOLACTONE 25 MG/1
25 TABLET ORAL
Qty: 30 TABLET | COMMUNITY
Start: 2023-12-11 | End: 2024-01-29 | Stop reason: SDUPTHER

## 2023-12-11 ASSESSMENT — FIBROSIS 4 INDEX: FIB4 SCORE: 2.8

## 2023-12-11 NOTE — PATIENT INSTRUCTIONS
Please decrease lasix to 20mg once a day.     Please decrease spironolactone to 12.5mg (or one half pill) once a day.     Please get non-fasting blood tests in 1 month.

## 2023-12-11 NOTE — PROGRESS NOTES
Cardiology Follow-up Consultation Note    Date of note:  12/11/2023  Primary Care Provider: Amanda Bazzi M.D.    Name:             Jana Overton   YOB: 1955  MRN:               [id number]    CC: palpitations.      Patient ID/HPI:   Jana Overton is a 68 y.o. female whose current medical problems include hypertension,  DVT, parosxysmal atrial fibrillation, TRUDY, COPD, and crohn's disease who is here for follow-up.     Clinic visit: 12/27/2017, at that time:     She also has moderate severity left sided chest tightness which radiates down her left arm. This is not necessarily associated with exertion.  She has been told to go to the emergency room multiple times for evaluation, however she continues to refuse as she's scared of getting inadequate treatment there.     SBP at home occasionally down to 70s/40s, HR up to 160s. + LE edema     She is currently not taking metoprolol or lasix. She does not take aspirin daily as it causes severe burning in her stomach.    Does see a pain specialist Dr. Telles and has been working on down-titrating opiates.     At our clinic visit: 12/29/2017  She still has chest pain, occasionally pressure-like at rest, resolves within minutes, also has sharp chest pain at times, associated with movement.     At our visit, 1/24/2018:  Pulse does go very high with exercise up to 140s with very mild exercise. Ziopatch showed periodic SVT and likely atrial fibrillation.  Still having palpitation daily.  No passing out, no falls.     In terms of TRUDY, she started her CPAP and feels much better in terms of her energy level and concentration.     In terms of her hypertension, 90s-100s/60s. Highest is 140s/90s.     Taking torsemide once a week along with potassium.     Still having moderate severity right sided chest pain associated with palpitations and ear pounding.     At our clinic visit, 5/30/2019:  2 days of drenching sweats, severe fatigue yesterday. Associated with  palpitations.  Up to taking metoprolol 25mg four times a day. Usual sitting pulse is 60, last was in the 100s.     Has also had nausea and anorexia and likely crohn's flare. Followed by Dr. Mahan and Dr. Sepulveda.      BP at home in the 110s-160s.  No syncope.     Currently with minimal cardiac symptoms, continues to have abdominal pain.     At our visit, 8/25/2020:  Had multiple crohn's flares and heat stroke since our last visit. Currently feeling well.      Just weaned herself off oxycodone.     In terms of a fib, since she's feeling well, no a fib recently.     At our visit, 5/27/2021:  When she climbs at elevation, she does get some chest tightness that resolves with rest and is associated with dizziness. When swimming she does ok.     Hypertension well controlled.     Does have palpitations and orthostatic dizziness at times.     At our visit, 6/7/2022:  Admitted 6/2/2022 for n/v and FLORES. She left AMA. This was her third hospitalization in the last month for similar compliants. Before that hospitalized for kidney stones back in December. She left AMA when she did not get IV diluadid per notes.  Weight up 20 pounds in the hospital.     Here with her  Goyo.     Pulse is in the 90s-120s. FLORES continues. Continues to have worsening diarrhea. COVID and flu were negative. Worried because home Bps are in the 150s.     At our visit, 9/6/2022:  Worsening chrons, frequent ER visits still and continued nausea and vomiting after every meal.     Worsening LE swelling, refractory to lasix. Takes this only 1-2 times a  week. Kcl pills do not get absorbed, her capsules are working. Can't tolerate (due to throat pain) the powder unfortunately even when this is in liquid.     Has not needed prn hydralazine.     Palpitations when her HR is above 100. Does exercise a lot still when not having flares, walks and swims and really enjoys this.     Interim History:  Admitted 10/12 to 10/28/23 for GI symptoms. She did have some  a fib, and was unable to take oral medications so transitioned to some IV. Had a rough hospitalization.  Has had 4 other admissions or ER visits for GI symptoms. Did have 22 pound weight gain from water weight during her admission which has been coming off now after she Saw Dr. Charles, 11/22/2023, and he started lasix.  Improved leg swelling on lasix.     Restarted spironolactone as soon as she got out of the hospital.     Now doing much better, working on training for a 5K.     Now exertion with her heart rate above 110 does cause palpitations and potentially a fib. Also associated exertional chest pain, all resolved when she slows her walking rate.          ROS    No bleeding issues.     No fevers.     No CVA symptoms.     Past Medical History:   Diagnosis Date    Anesthesia     Difficult IV stick    Anxiety     Arthritis     all over    ASTHMA     Atrial fib/flut     Backpain     Bronchitis     5 years    Chronic pain     Colostomy in place ([facility])     COPD (chronic obstructive pulmonary disease) ([facility])     Crohn's disease of colon ([facility])     Dyspnea     History of cardiac murmur     Ileostomy present ([facility])     Infectious disease     MRSA, VancoRSA    Multiple falls     Narcotic dependence ([facility])     Obstruction     Pain     Pneumonia     child and mid 30's    Postherpetic neuralgia     Rosacea     Sleep apnea          Past Surgical History:   Procedure Laterality Date    AR UPPER GI ENDOSCOPY,DIAGNOSIS N/A 2/8/2023    Procedure: GASTROSCOPY;  Surgeon: Jamarcus Davalos M.D.;  Location: SURGERY SAME DAY [facility];  Service: Gastroenterology    AR UPPER GI ENDOSCOPY,W/DILAT,GASTRIC OUT N/A 2/8/2023    Procedure: GASTROSCOPY, WITH BALLOON DILATION;  Surgeon: Jamarcus Davalos M.D.;  Location: SURGERY SAME DAY [facility];  Service: Gastroenterology    AR UPPER GI ENDOSCOPY,BIOPSY N/A 2/8/2023    Procedure: GASTROSCOPY, WITH BIOPSY;  Surgeon: Jamarcus Davalos M.D.;  Location: SURGERY SAME DAY [facility];  Service: Gastroenterology     DE UPPER GI ENDOSCOPY,DIAGNOSIS N/A 1/16/2023    Procedure: GASTROSCOPY;  Surgeon: Jamarcus Davalos M.D.;  Location: SURGERY SAME DAY [facility];  Service: Gastroenterology    DE UPPER GI ENDOSCOPY,W/DILAT,GASTRIC OUT N/A 1/16/2023    Procedure: GASTROSCOPY, WITH BALLOON DILATION;  Surgeon: Jamarcus Davalos M.D.;  Location: SURGERY SAME DAY [facility];  Service: Gastroenterology    DE UPPER GI ENDOSCOPY,BIOPSY N/A 1/16/2023    Procedure: GASTROSCOPY, WITH BIOPSY;  Surgeon: Jamarcus Davalos M.D.;  Location: SURGERY SAME DAY [facility];  Service: Gastroenterology    DE UPPER GI ENDOSCOPY,DIAGNOSIS N/A 10/24/2022    Procedure: GASTROSCOPY;  Surgeon: Jamarcus Davalos M.D.;  Location: SURGERY SAME DAY [facility];  Service: Gastroenterology    BILIARY DILATATION N/A 10/24/2022    Procedure: DILATION, STRICTURE, BILE DUCT;  Surgeon: Jamarcus Davalos M.D.;  Location: SURGERY SAME DAY [facility];  Service: Gastroenterology    DE CYSTOSCOPY,INSERT URETERAL STENT Right 12/23/2021    Procedure: CYSTOSCOPY, WITH URETERAL STENT EXCHANGE;  Surgeon: Jonathan Turcios M.D.;  Location: [facility];  Service: Urology    DE CYSTO/URETERO/PYELOSCOPY, DX Right 12/23/2021    Procedure: URETEROSCOPY;  Surgeon: Jonathan Turcios M.D.;  Location: [facility];  Service: Urology    DE CYSTO/URETERO/PYELOSCOPY, DX Right 12/8/2021    Procedure: URETEROSCOPY;  Surgeon: Luc Hook M.D.;  Location: [facility];  Service: Urology    CYSTO STENT PLACEMNT PRE SURG Right 12/8/2021    Procedure: CYSTOSCOPY, WITH URETERAL STENT INSERTION;  Surgeon: Luc Hook M.D.;  Location: [facility];  Service: Urology    ILEOSTOMY  1/20/2021    Procedure: ILEOSTOMY- COMPLEX REVISION,;  Surgeon: Kevin Cruz M.D.;  Location: [facility];  Service: General    LYSIS ADHESIONS GENERAL  1/20/2021    Procedure: LYSIS, ADHESIONS;  Surgeon: Kevin Cruz M.D.;  Location: SURGERY [facility];  Service: General    GASTROSCOPY N/A 5/22/2019     Procedure: GASTROSCOPY - WITH DILATION;  Surgeon: Dionicio Cardona M.D.;  Location: SURGERY [facility];  Service: Gastroenterology    GASTROSCOPY  1/6/2019    Procedure: GASTROSCOPY- WITH DILATION;  Surgeon: Daniel Daniels M.D.;  Location: SURGERY [facility];  Service: Gastroenterology    ILEOSTOMY  12/29/2018    Procedure: ILEOSTOMY- REVISION;  Surgeon: Kevin Cruz M.D.;  Location: SURGERY [facility];  Service: General    SIGMOIDOSCOPY FLEX  12/29/2018    Procedure: SIGMOIDOSCOPY FLEX;  Surgeon: Kevin Cruz M.D.;  Location: SURGERY [facility];  Service: General    EXPLORATORY LAPAROTOMY  12/29/2018    Procedure: EXPLORATORY LAPAROTOMY, lysis of adhesions;  Surgeon: Kevin Cruz M.D.;  Location: SURGERY [facility];  Service: General    ILEOSTOMY  5/14/2014    Performed by Kevin Cruz M.D. at SURGERY [facility]    MAMMOPLASTY REDUCTION  7/17/2013    Performed by Janey Burt M.D. at SURGERY [facility]    HARDWARE REMOVAL NEURO  7/16/2012    Performed by KEELEY KIM at SURGERY [facility]    GASTROSCOPY  10/4/2011    Performed by TYSON MARTINEZ at SURGERY SAME DAY [facility]    COLONOSCOPY  10/4/2011    Performed by TYSON MARTINEZ at SURGERY SAME DAY [facility]    DILATION AND CURETTAGE  9/24/2010    Performed by GAURAV ALY at SURGERY SAME DAY [facility]    ILEOSTOMY  11/11/2009    Performed by KEVIN CRUZ at SURGERY [facility]    GASTROSCOPY WITH BIOPSY  11/22/08    Performed by NEGRITA HUYNH at SURGERY [facility]    ABDOMINAL EXPLORATION      CERVICAL DISK AND FUSION ANTERIOR      COLON RESECTION      FOOT SURGERY      HAND SURGERY      LUMBAR FUSION ANTERIOR      LUMPECTOMY      OTHER ABDOMINAL SURGERY      surgery for chrons disease    CA BREAST REDUCTION           Current Outpatient Medications   Medication Sig Dispense Refill    spironolactone (ALDACTONE) 25 MG Tab Take 0.5 Tablets by mouth every day. 30  Tablet     furosemide (LASIX) 20 MG Tab Take 1 Tablet by mouth every day. 100 Tablet 3    diazePAM (VALIUM) 5 MG Tab Take 5 mg by mouth every 6 hours as needed (MUSCLE SPASMS).      potassium chloride SA (KDUR) 20 MEQ Tab CR Take 1 Tablet by mouth 2 times a day. 60 Tablet 2    scopolamine (TRANSDERM SCOP, 1.5 MG,) 1 mg/72hr PATCH 72 HR Place 1 Patch on the skin every 72 hours. 4 Patch 3    Azelastine (ASTELIN) 137 MCG/SPRAY Solution Administer 1 Spray into each nostril at bedtime.      fluticasone (FLONASE) 50 MCG/ACT nasal spray Administer 1 Spray into each nostril at bedtime.      prochlorperazine (COMPAZINE) 10 MG Tab Take 1 Tablet by mouth every 6 hours as needed for Nausea/Vomiting. 30 Tablet 3    oxyCODONE immediate release (ROXICODONE) 10 MG immediate release tablet Take 1 Tablet by mouth every 6 hours as needed for Severe Pain (Refill #3 of #3.) for up to 30 days. 120 Tablet 0    ondansetron (ZOFRAN ODT) 4 MG TABLET DISPERSIBLE Take 1 Tablet by mouth every 6 hours as needed for Nausea/Vomiting. 20 Tablet 5    granisetron (KYTRIL) 1 MG Tab Take 1 Tablet by mouth every 12 hours as needed for Nausea/Vomiting. 10 Tablet 0    metoprolol tartrate (LOPRESSOR) 50 MG Tab Take 1 Tablet by mouth 2 times a day. 180 Tablet 2    levalbuterol (XOPENEX HFA) 45 MCG/ACT inhaler Inhale 2 Puffs every four hours as needed for Shortness of Breath (As needed for shortness of breath, cough, wheezing.). 1 Each 11    Ascorbic Acid (VITAMIN C PO) Take 1 Tablet by mouth 2 times a day.      naratriptan (AMERGE) 2.5 MG tablet Take 1 Tablet by mouth as needed for Migraine. 5 Tablet 3    hydroxychloroquine (PLAQUENIL) 200 MG Tab Take 1 Tablet by mouth every day. 90 Tablet 3    cephALEXin (KEFLEX) 500 MG Cap Take 1 Capsule by mouth 4 times a day. 28 Capsule 0    nystatin (MYCOSTATIN) 033531 UNIT/ML Suspension Take 5 mL by mouth 4 times a day. 60 mL 0    traZODone (DESYREL) 50 MG Tab Take 1 Tablet by mouth every evening. 90 Tablet 3     "Multiple Vitamins-Minerals (CENTRUM SILVER 50+WOMEN) Tab Take 1 Tablet by mouth 2 times a day. GUMMIES.      ZINC PICOLINATE PO Take 1 Tablet by mouth 2 times a day.      Biotin w/ Vitamins C & E (HAIR SKIN & NAILS GUMMIES PO) Take 1 Tablet by mouth 2 times a day.       No current facility-administered medications for this visit.         Allergies   Allergen Reactions    Demerol Hcl [Meperidine] Shortness of Breath and Palpitations    Methotrexate Hives, Shortness of Breath and Rash          Morphine Shortness of Breath and Palpitations    Promethazine Shortness of Breath and Rash          Remicade [Infliximab] Hives, Shortness of Breath and Rash          Sudafed [Pseudoephedrine] Shortness of Breath, Vomiting and Palpitations    Azathioprine Sodium Hives and Shortness of Breath     10/21/2022 - patient unable to verify allergy/reaction  Historical reaction listed: Hives, Shortness of Breath    Carafate [Sucralfate] Vomiting and Nausea     + Mouth Sores    Other Drug Unspecified     \"STEROIDS\" - REACTION NOT SPECIFIED    Sulfa Drugs Rash          Sulfasalazine Rash          Gabapentin Cough, Hives, Itching, Nausea, Palpitations, Photosensitivity, Rash, Runny Nose, Swelling, Unspecified and Vomiting    Amitriptyline Unspecified     Nightmares      Ativan [Lorazepam] Unspecified     Nightmares    Betamethasone Unspecified     10/21/2022 - patient unable to verify allergy/reaction  No historical reaction listed    Fentanyl Unspecified     \"Patches didn't work\" and skin broke down    Lyrica [Pregabalin] Nausea          Methadone Unspecified     \"Didn't work\"    Potassium Unspecified     Notes: In IV only  REACTION NOT SPECIFIED    Tizanidine Unspecified     10/21/2022 - patient unable to verify allergy/reaction  No historical reaction listed         Family History   Problem Relation Age of Onset    Lung Disease Mother         copd    Diabetes Father     Heart Disease Father     Diabetes Sister     Cancer Maternal Aunt " 40        breast         Social History     Socioeconomic History    Marital status:      Spouse name: Not on file    Number of children: Not on file    Years of education: Not on file    Highest education level: Associate degree: academic program   Occupational History    Not on file   Tobacco Use    Smoking status: Never    Smokeless tobacco: Never    Tobacco comments:     second hand smoke parents - smoked for only 1 year many years ago   Vaping Use    Vaping Use: Every day    Substances: THC   Substance and Sexual Activity    Alcohol use: Not Currently     Alcohol/week: 0.6 oz     Types: 1 Shots of liquor per week     Comment: gin and half a lime; tonic water    Drug use: Yes     Types: Marijuana, Inhaled     Comment: medical marijuana through bong/vape    Sexual activity: Not on file     Comment:    Other Topics Concern    Not on file   Social History Narrative    Not on file     Social Determinants of Health     Financial Resource Strain: Unknown (1/15/2023)    Overall Financial Resource Strain (CARDIA)     Difficulty of Paying Living Expenses: Patient refused   Food Insecurity: Unknown (1/15/2023)    Hunger Vital Sign     Worried About Running Out of Food in the Last Year: Patient refused     Ran Out of Food in the Last Year: Patient refused   Transportation Needs: Unknown (1/15/2023)    PRAPARE - Transportation     Lack of Transportation (Medical): Patient refused     Lack of Transportation (Non-Medical): Patient refused   Physical Activity: Sufficiently Active (1/15/2023)    Exercise Vital Sign     Days of Exercise per Week: 7 days     Minutes of Exercise per Session: 60 min   Stress: No Stress Concern Present (11/4/2021)    [employer] [place] [employer] - Occupational Stress Questionnaire     Feeling of Stress : Not at all   Social Connections: Socially Integrated (1/15/2023)    Social Connection and Isolation Panel [NHANES]     Frequency of Communication with Friends and Family:  Three times a week     Frequency of Social Gatherings with Friends and Family: Once a week     Attends [employer] Services: More than 4 times per year     Active Member of Clubs or Organizations: Yes     Attends Club or Organization Meetings: More than 4 times per year     Marital Status:    Intimate Partner Violence: Not on file   Housing Stability: Unknown (1/15/2023)    Housing Stability Vital Sign     Unable to Pay for Housing in the Last Year: Patient refused     Number of Places Lived in the Last Year: 1     Unstable Housing in the Last Year: No         Physical Exam:  Ambulatory Vitals  /50 (BP Location: Left arm, Patient Position: Sitting, BP Cuff Size: Adult)   Pulse 74   Resp 12   Ht 1.829 m (6')   Wt 65.3 kg (144 lb)   SpO2 96%    Oxygen Therapy:  Pulse Oximetry: 96 %  BP Readings from Last 4 Encounters:   12/11/23 108/50   11/29/23 122/60   11/22/23 120/80   10/28/23 (!) 142/67       Weight/BMI: Body mass index is 19.53 kg/m².  Wt Readings from Last 4 Encounters:   12/11/23 65.3 kg (144 lb)   11/29/23 68.9 kg (152 lb)   11/22/23 67.5 kg (148 lb 12.8 oz)   10/15/23 68.9 kg (151 lb 14.4 oz)     General: NAD  Eyes: nl conjunctiva  Neck: JVP 2 cm H2O  Lungs: normal respiratory effort, CTAB  Heart: RRR, no murmurs, no rubs or gallops, trace edema bilateral lower extremities. No LV/RV heave on cardiac palpatation. 2+ bilateral radial pulses.     Abdomen: did not examine due to tenderness.   Extremities/MSK: no clubbing, no cyanosis  Neurological: No focal sensory deficits  Psychiatric: Appropriate affect, A/O x 3, intact judgement and insight  Skin: Warm extremities    Exam repeated in full and unchanged except for as noted above.    Lab Data Review:  Lab Results   Component Value Date/Time    CHOLSTRLTOT 194 05/23/2022 02:36 AM    LDL 90 05/23/2022 02:36 AM    HDL 81 05/23/2022 02:36 AM    TRIGLYCERIDE 113 05/23/2022 02:36 AM       Lab Results   Component Value Date/Time    SODIUM 140  "11/17/2023 10:34 AM    POTASSIUM 3.8 11/17/2023 10:34 AM    CHLORIDE 101 11/17/2023 10:34 AM    CO2 28 11/17/2023 10:34 AM    GLUCOSE 85 11/17/2023 10:34 AM    BUN 21 11/17/2023 10:34 AM    CREATININE 1.11 11/17/2023 10:34 AM    CREATININE 0.89 02/10/2010 04:04 PM    BUNCREATRAT 17 02/10/2010 04:04 PM    GLOMRATE >59 02/10/2010 04:04 PM     Lab Results   Component Value Date/Time    ALKPHOSPHAT 96 11/17/2023 10:34 AM    ASTSGOT 27 11/17/2023 10:34 AM    ALTSGPT 20 11/17/2023 10:34 AM    TBILIRUBIN 0.5 11/17/2023 10:34 AM      Lab Results   Component Value Date/Time    WBC 4.5 (L) 10/28/2023 04:27 AM    WBC 7.9 02/10/2010 04:04 PM     Lab Results   Component Value Date/Time    HBA1C 5.3 11/17/2023 10:34 AM     No components found for: \"TROP\"          Cardiac Imaging and Procedures Review:    EKG dated 12/26/2017 : My personal interpretation is NSR, LAE, RsR' in V1/V2       Ziopatch 12/2020:  CONCLUSIONS:   * 13 days and 9 hours of ziopatch recordings reviewed.   * Most symptoms associated with sinus rhythm in the 80s, however one episode was associated with heart rates up to 180s. Read as sinus by computer, but based on tracings I believe this was a supraventricular tachycardia. These were on 11/28 at around 2:35PM. Correlate clinically.   * No clear atrial fibrillation.   * Rare PACs/PVCs   * No ventricular tachycardia, automated read incorrectly interpreted artifact.       TTE (8/2021):  CONCLUSIONS  Normal left ventricular systolic function.   No evidence of valvular abnormality based on Doppler evaluation.   Compared to the images of the prior study done 12/27/17 -  there has   been no significant change.         Nuclear Perfusion Imaging (2013):   RESULTS:  1.  There were no new ECG changes and no arrhythmia.    2.  Heart rate did increase to 92 and the blood pressure negrito somewhat to   162/77 after an initial drop to 117 systolic but then returned to baseline.  3.  The raw data demonstrated no unexpected " extracardiac isotope uptake,   fairly dense collection of isotope in the right mid to lower abdomen.  4.  Rest-stress image comparison reveals no perfusion abnormalities in any of   the multiple SPECT image panels.  5.  The gated wall motion study demonstrated low cardiac volumes with very   small end systolic volume.  The global contractility was normal, EF 74%.    Regional contractility also normal.     SUMMARY:  Lexiscan stress myocardial perfusion stress imaging with technetium   99m tetrofosmin demonstratin.  No infarct or ischemia.  2.  Normal global and hyperdynamic regional function, EF 74%.  3.  No ECG changes or arrhythmia.  4.  No prior for comparison.              Radiology test Review:  CXR:   The mediastinal and cardiac silhouette is unremarkable.    The pulmonary vascularity is within normal limits.    The lung parenchyma demonstrates no airspace process. Calcified granuloma in the right lateral mid hemithorax is unchanged..    There is no significant pleural effusion.    There is no visible pneumothorax.    There are no acute bony abnormalities.   Impression        1.  No evidence of acute cardiopulmonary process.      PET 2018:  ELECTROCARDIOGRAPHIC FINDINGS:  EKG review shows a normal sinus rhythm with no   ischemic ST segment changes at rest or stress.     SCINTOGRAPHIC FINDINGS:  There is normal homogenous tracer uptake at rest and   stress.     GATED WALL MOTION FINDINGS:  Show an ejection fraction of 72% at rest, which   increases to 78% at peak stress.     CONCLUSIONS AND IMPRESSION:  Normal cardiac stress test.     [facility] 2021:  Coronary calcification:  LMA - 0.0  LCX - 0.0  LAD - 0.0  RCA - 0.0  PDA - 0.0     Total Calcium Score: 0.0     Medical Decision Makin. Atrial fibrillation, unspecified type (CMS-HCC)  Paroxysmal, asymptomatic.  Also has occasional short runs of symptomatic SVT.  given normotension at home, her [id number] score of 2.  We previously have discussed at  length risk/benefit of anticoagulation given her history of crohns, and she opted for only anticoagulation with aspirin at this time. She since has stopped aspirin as well due to stomach discomfort.     -continue metoprolol to 50mg PO bid for rate control (she takes this 25mg qid at times which is fine). She does get symptomatic hypotension at higher doses.   -consider digoxin or diltiazem as an adjunctive agent if worsening symptomatic palpitations. Could also do ziopatch to get degree of a fib and consider ablation or anti-arrhythmic therapy.     2. Other chest pain  Previous negative stress test and CCS of 0. Very unlikely cardiac. Has resolved.   -echo WNL.   -CTM    3. Essential (primary) hypertension   Likely white coat hypertension given very low readings at home that she has confirmed with her PCP's blood pressure cuff. Her Bps have recently been labile possibly due to dehydration and variable bowel habits.   -decrease spironolactone at 12.5mg PO daily as she did much better on this medication. We discussed high risk for recurrent FLORES and dehydration precautions. We will watch her renal function closely.   -hydralazine 10mg PO prn SBP >150  -continue metoprolol.   -lasix as per below     4. Chronic diastolic heart failure.   NYHA class II, stage C. Spironolactone and lasix previously stopped due to dehydration and FLORES, however she does appear to need these now.   -decrease spironolactone to 12.5mg PO Daily  -decrease lasix to 20mg PO Daily. High risk medication due to potential nephrotoxicity, will check BMP in 1 month.   -baseline GFR around 70, so she is likely mildly dehydrated now. Dry weight likely is 146 to 148 pounds.   -continue potassium.      5. TRUDY  -continue CPAP, many of symptoms much improved.       Return in about 3 months (around 3/11/2024).      Harvey Monahan MD  [facility]  [facility], [address]  [address] [place] [address], [address] [address]  [address]  Phone: [phone]  Fax: [phone]

## 2023-12-15 ENCOUNTER — NON-PROVIDER VISIT (OUTPATIENT)
Dept: NEUROLOGY | Facility: MEDICAL CENTER | Age: 68
End: 2023-12-15
Attending: SPECIALIST
Payer: MEDICARE

## 2023-12-15 DIAGNOSIS — G62.89 OTHER POLYNEUROPATHY: ICD-10-CM

## 2023-12-15 PROCEDURE — 95886 MUSC TEST DONE W/N TEST COMP: CPT | Performed by: SPECIALIST

## 2023-12-15 PROCEDURE — 95909 NRV CNDJ TST 5-6 STUDIES: CPT | Mod: 26 | Performed by: SPECIALIST

## 2023-12-15 PROCEDURE — 95909 NRV CNDJ TST 5-6 STUDIES: CPT | Performed by: SPECIALIST

## 2023-12-15 PROCEDURE — 95886 MUSC TEST DONE W/N TEST COMP: CPT | Mod: 26 | Performed by: SPECIALIST

## 2023-12-15 NOTE — PROCEDURES
NERVE CONDUCTION STUDIES AND ELECTROMYOGRAPHY REPORT        12/15/23      Referring provider: Chase Charles D.O.      SUMMARY OF PATIENT'S CLINICAL HISTORY,PHYSICAL EXAM, AND RATIONALE FOR TESTING:    Ms. Jana Overton 68 y.o. presenting with burning pain in both feet and lower extremities in a patient with a history of longstanding Crohn's disease and peripheral edema.  Her lower extremity symptoms have improved with improvement of the edema.    Past Medical History is significant for :   Past Medical History:   Diagnosis Date    Anesthesia     Difficult IV stick    Anxiety     Arthritis     all over    ASTHMA     Atrial fib/flut     Backpain     Bronchitis     5 years    Chronic pain     Colostomy in place ([facility])     COPD (chronic obstructive pulmonary disease) ([facility])     Crohn's disease of colon ([facility])     Dyspnea     History of cardiac murmur     Ileostomy present ([facility])     Infectious disease     MRSA, VancoRSA    Multiple falls     Narcotic dependence ([facility])     Obstruction     Pain     Pneumonia     child and mid 30's    Postherpetic neuralgia     Rosacea     Sleep apnea            The electrodiagnostic studies were performed to evaluate for possible neuropathy versus radiculopathy.      ELECTRODIAGNOSTIC EXAMINATION:  Nerve conduction studies (NCS) and electromyography (EMG) are utilized to evaluate direct or indirect damage to the peripheral nervous system. NCS are performed to measure the nerve(s) response(s) to electrostimulation across a given nerve segment. EMG evaluates the passive and active electrical activity of the muscle(s) in question.  Muscles are innervated by specific peripheral nerves and roots. Often times, several nerves the muscle to be examined in order to determine the presence or absence of the disease process. Furthermore, nerves and muscles may need to be tested in a [id number] comparison, as well as in additional extremities, as this may be crucial in characterizing the extent  of the disease process, which may be diffuse or isolated and of varying degree of severity. The extent of the neurodiagnostic exam is justified as it may help arrive to a proper diagnosis, which ultimately may contribute to better management of the patient. Therefore, the nerves to muscles examined during the study were medically necessary.    Unless otherwise noted, temperature of the extremity(s) study was monitored before and during the examination and remained between 32 and 36 degrees C for the upper extremities, and between 30 and 36 degrees C for the lower extremities.      NERVE CONDUCTION STUDIES:  Sensory nerves:   - Bilateral Sural nerves were tested. The responses were within normal limits.    Motor nerves:     - Bilateral Tibial nerves were examined. Recording electrodes placed at the Abductor Hallucis muscles. The responses were within normal limits.  - Bilateral Deep Peroneal motor nerves were examined. Recording electrodes were placed at the Extensor Digitorum Brevis muscles.The responses were within normal limits.     No temporal dispersion or conduction block observed in any of the motor nerves examined.        ELECTROMYOGRAPHY:  The study was performed the concentric needle electrode. Fibrillation and fasciculation activity is graded by convention from none (0) to continuous (4+).  Needle electrode examination was performed in the following muscles: Bilateral vastus lateralis, tibialis anterior, gastrocnemius, extensor digitorum brevis, abductor hallucis.  The muscles tested demonstrated normal insertional activity, normal motor unit morphology and recruitment. There were no elements suggestive of active denervation.      Nerve Conduction Studies     Stim Site NR Onset (ms) Norm Onset (ms) O-P Amp (µV) Norm O-P Amp Site1 Site2 Delta-P (ms) Dist (cm) Yan (m/s) Norm Yan (m/s)   Left Sural Anti Sensory (Lat Mall)   Calf    2.9 <4.6 4.9 >3 Calf Lat Mall 3.4 14.0 41 >40   Right Sural Anti Sensory (Lat  Mall)   Calf    2.8 <4.6 9.7 >3 Calf Lat Mall 3.8 14.0 *37 >40        Stim Site NR Onset (ms) Norm Onset (ms) O-P Amp (mV) Norm O-P Amp Site1 Site2 Delta-0 (ms) Dist (cm) Yan (m/s) Norm Yan (m/s)   Left Peroneal EDB Motor (Ext Dig Brev)   Ankle    3.8 <6 3.4 >2.5 B Fib Ankle 7.8 37.0 47 >40   B Fib    11.6  2.9  Poplt B Fib 1.8 10.0 56    Poplt    13.4  3.1          Right Peroneal EDB Motor (Ext Dig Brev)   Ankle    3.8 <6 3.5 >2.5 B Fib Ankle 9.2 38.0 41 >40   B Fib    13.0  2.6  Poplt B Fib 1.4 10.0 71    Poplt    14.4  2.9          Left Tibial Motor (Abd Brennna Brev)   Ankle    4.5 <6 10.8 >4 Knee Ankle 9.3 41.0 44 >40   Knee    13.8  6.2          Right Tibial Motor (Abd Brennan Brev)   Ankle    4.0 <6 7.5 >4 Knee Ankle 9.2 42.0 46 >40   Knee    13.2  3.7                          Electromyography     Side Muscle Nerve Root Ins Act Fibs Psw Amp Dur Poly Recrt Int Pat Comment   Right VastusLat Femoral L2-4 Nml Nml Nml Nml Nml 0 Nml Nml    Right AntTibialis Dp Br Fibular L4-5 Nml Nml Nml Nml Nml 0 Nml Nml    Right Gastroc Tibial S1-2 Nml Nml Nml Nml Nml 0 Nml Nml    Right Ext Dig Brev Dp Br Fibular L5, S1 Nml Nml Nml Nml Nml 0 Nml Nml    Right AbdHallucis MedPlantar S1-2 Nml Nml Nml Nml Nml 0 Nml Nml    Left VastusLat Femoral L2-4 Nml Nml Nml Nml Nml 0 Nml Nml    Left AntTibialis Dp Br Fibular L4-5 Nml Nml Nml Nml Nml 0 Nml Nml    Left Gastroc Tibial S1-2 Nml Nml Nml Nml Nml 0 Nml Nml    Left Ext Dig Brev Dp Br Fibular L5, S1 Nml Nml Nml Nml Nml 0 Nml Nml    Left AbdHallucis MedPlantar S1-2 Nml Nml Nml Nml Nml 0 Nml Nml            DIAGNOSTIC INTERPRETATION:   Extensive electrodiagnostic studies were performed to the lower extremities.  The results are as follows:    1.  Normal EMG and nerve conduction study right lower extremity.    2.  Normal EMG and nerve conduction study left lower extremity.    CLINICAL DISCUSSION:   Specifically bilateral peroneal and tibial motor and sural sensory conduction studies were within  normal limits.  There were no acute or chronic denervation changes in selected muscles studied in the bilateral lower extremities.  There was no evidence of peripheral neuropathy, plexopathy or radiculopathy in the bilateral lower extremities.  Clinical correlation is suggested.      EMILE Corbett M.D.(No note.)

## 2023-12-19 ENCOUNTER — TELEPHONE (OUTPATIENT)
Dept: CARDIOLOGY | Facility: MEDICAL CENTER | Age: 68
End: 2023-12-19
Payer: MEDICARE

## 2023-12-19 ENCOUNTER — APPOINTMENT (OUTPATIENT)
Dept: NEUROLOGY | Facility: MEDICAL CENTER | Age: 68
End: 2023-12-19
Attending: PSYCHIATRY & NEUROLOGY
Payer: MEDICARE

## 2023-12-19 NOTE — TELEPHONE ENCOUNTER
FELICITA    Caller: Jana Overton    Topic/issue: Patient is wondering if FELICITA can just prescribed 12.5mg tablets of her spironolactone (ALDACTONE) instead of the 25mg because it is hard for her and her spouse to cut the pill in half due to it crumbling and such.    Callback Number: [phone]    Thank you,  -Diana FERREIRA

## 2023-12-19 NOTE — TELEPHONE ENCOUNTER
JM: pt takes 12.5mg per day, are there any options available since they are struggling to cut the pills in half? Thank you!

## 2023-12-20 NOTE — TELEPHONE ENCOUNTER
Harvey Monahan M.D.  You7 hours ago (2:55 AM)     Unfortunately there is no 12.5mg pill size. We can always try just 25mg a day and see   how that goes or 25mg every other day. Thank you!     ============================================  Phone #: [phone]     Message: Called pt to share FELICITA's suggestions, LVM     Plan: Sent [employer] message.

## 2023-12-21 ENCOUNTER — TELEPHONE (OUTPATIENT)
Dept: MEDICAL GROUP | Facility: LAB | Age: 68
End: 2023-12-21
Payer: MEDICARE

## 2023-12-21 NOTE — TELEPHONE ENCOUNTER
Telephone call returned to the patient, is not sure what medication she was told to potentially take that I have discontinued.  There was a question about her taking Valium which is being used for muscle spasms.  Patient has significant problems with spasm and this would be an appropriate medication at low-dose for her to be on.

## 2023-12-22 ENCOUNTER — OFFICE VISIT (OUTPATIENT)
Dept: MEDICAL GROUP | Facility: LAB | Age: 68
End: 2023-12-22
Payer: MEDICARE

## 2023-12-22 VITALS
HEIGHT: 72 IN | TEMPERATURE: 98.3 F | RESPIRATION RATE: 16 BRPM | DIASTOLIC BLOOD PRESSURE: 70 MMHG | SYSTOLIC BLOOD PRESSURE: 134 MMHG | BODY MASS INDEX: 20.18 KG/M2 | HEART RATE: 74 BPM | WEIGHT: 149 LBS | OXYGEN SATURATION: 96 %

## 2023-12-22 DIAGNOSIS — D69.6 THROMBOCYTOPENIA (HCC): ICD-10-CM

## 2023-12-22 DIAGNOSIS — K22.2 ESOPHAGEAL STRICTURE: ICD-10-CM

## 2023-12-22 DIAGNOSIS — Z93.2 ILEOSTOMY IN PLACE (HCC): ICD-10-CM

## 2023-12-22 DIAGNOSIS — R11.0 NAUSEA: ICD-10-CM

## 2023-12-22 DIAGNOSIS — D64.9 ANEMIA, UNSPECIFIED TYPE: ICD-10-CM

## 2023-12-22 DIAGNOSIS — R25.2 SPASM: ICD-10-CM

## 2023-12-22 DIAGNOSIS — K50.019 CROHN'S DISEASE OF SMALL INTESTINE WITH COMPLICATION (HCC): ICD-10-CM

## 2023-12-22 PROCEDURE — 3075F SYST BP GE 130 - 139MM HG: CPT | Performed by: INTERNAL MEDICINE

## 2023-12-22 PROCEDURE — 99214 OFFICE O/P EST MOD 30 MIN: CPT | Performed by: INTERNAL MEDICINE

## 2023-12-22 PROCEDURE — 3078F DIAST BP <80 MM HG: CPT | Performed by: INTERNAL MEDICINE

## 2023-12-22 RX ORDER — DIAZEPAM 5 MG/1
5 TABLET ORAL EVERY 6 HOURS PRN
Qty: 60 TABLET | Refills: 2 | Status: SHIPPED | OUTPATIENT
Start: 2023-12-22 | End: 2024-01-21

## 2023-12-22 RX ORDER — OXYCODONE HYDROCHLORIDE 10 MG/1
10 TABLET ORAL EVERY 6 HOURS PRN
Qty: 120 TABLET | Refills: 0 | Status: SHIPPED | OUTPATIENT
Start: 2023-12-22 | End: 2024-01-03 | Stop reason: SDUPTHER

## 2023-12-22 RX ORDER — SCOLOPAMINE TRANSDERMAL SYSTEM 1 MG/1
1 PATCH, EXTENDED RELEASE TRANSDERMAL
Qty: 4 PATCH | Refills: 3 | Status: SHIPPED | OUTPATIENT
Start: 2023-12-22

## 2023-12-22 ASSESSMENT — FIBROSIS 4 INDEX: FIB4 SCORE: 2.8

## 2023-12-22 NOTE — LETTER
[facility]  Chase Charles D.O.  [address] [facility] [address]  [place] [address]  Fax: [phone]   Authorization for Release/Disclosure of   Protected Health Information   Name: JANA OVERTON : 1955 SSN: [id number]   Address: [address] [place] [address]  [place] [address] Phone:    [phone] (home)    I authorize the entity listed below to release/disclose the PHI below to:   [facility]/Chase Charles D.O. and Chase Charles D.O.   Provider or Entity Name:     Address   City, State, Zip   Phone:      Fax:     Reason for request: continuity of care   Information to be released:    [  ] LAST COLONOSCOPY,  including any PATH REPORT and follow-up  [  ] LAST FIT/COLOGUARD RESULT [  ] LAST DEXA  [  ] LAST MAMMOGRAM  [  ] LAST PAP  [  ] LAST LABS [  ] RETINA EXAM REPORT  [  ] IMMUNIZATION RECORDS  [  ] Release all info      [  ] Check here and initial the line next to each item to release ALL health information INCLUDING  _____ Care and treatment for drug and / or alcohol abuse  _____ HIV testing, infection status, or AIDS  _____ Genetic Testing    DATES OF SERVICE OR TIME PERIOD TO BE DISCLOSED: _____________  I understand and acknowledge that:  * This Authorization may be revoked at any time by you in writing, except if your health information has already been used or disclosed.  * Your health information that will be used or disclosed as a result of you signing this authorization could be re-disclosed by the recipient. If this occurs, your re-disclosed health information may no longer be protected by State or Federal laws.  * You may refuse to sign this Authorization. Your refusal will not affect your ability to obtain treatment.  * This Authorization becomes effective upon signing and will  on (date) __________.      If no date is indicated, this Authorization will  one (1) year from the signature date.    Name: Jana Overton  Signature: Date:   2023     PLEASE FAX REQUESTED  RECORDS BACK TO: [phone]

## 2023-12-22 NOTE — PROGRESS NOTES
CC: Jana Overton is a 68 y.o. female is suffering from   Chief Complaint   Patient presents with    Follow-Up         SUBJECTIVE:  1. Nausea  Dc is here for follow-up continues to struggle with nausea states that she has done well with scopolamine patch    2. Crohn's disease of small intestine with complication (HCC)  History of Crohn's disease status post colectomy continue with ileostomy    3. Esophageal stricture  Status post repeated esophageal dilatations    4. Ileostomy in place (HCC)  Stable secondary to Crohn's disease    5. Anemia, unspecified type  Likely anemia of chronic disease    6. Spasm  Continue Valium    7. Thrombocytopenia (HCC)  No change in medical therapy        Past social, family, history: , Goyo  Social History     Tobacco Use    Smoking status: Never    Smokeless tobacco: Never    Tobacco comments:     second hand smoke parents - smoked for only 1 year many years ago   Vaping Use    Vaping Use: Every day    Substances: THC   Substance Use Topics    Alcohol use: Not Currently     Alcohol/week: 0.6 oz     Types: 1 Shots of liquor per week     Comment: gin and half a lime; tonic water    Drug use: Yes     Types: Marijuana, Inhaled     Comment: medical marijuana through bong/vape         MEDICATIONS:    Current Outpatient Medications:     scopolamine (TRANSDERM SCOP, 1.5 MG,) 1 mg/72hr PATCH 72 HR, Place 1 Patch on the skin every 72 hours., Disp: 4 Patch, Rfl: 3    oxyCODONE immediate release (ROXICODONE) 10 MG immediate release tablet, Take 1 Tablet by mouth every 6 hours as needed for Severe Pain (Refill #3 of #3.) for up to 30 days., Disp: 120 Tablet, Rfl: 0    diazePAM (VALIUM) 5 MG Tab, Take 1 Tablet by mouth every 6 hours as needed for Anxiety for up to 30 days., Disp: 60 Tablet, Rfl: 2    spironolactone (ALDACTONE) 25 MG Tab, Take 0.5 Tablets by mouth every day., Disp: 30 Tablet, Rfl:     furosemide (LASIX) 20 MG Tab, Take 1 Tablet by mouth every day., Disp: 100 Tablet, Rfl:  3    diazePAM (VALIUM) 5 MG Tab, Take 5 mg by mouth every 6 hours as needed (MUSCLE SPASMS)., Disp: , Rfl:     hydroxychloroquine (PLAQUENIL) 200 MG Tab, Take 1 Tablet by mouth every day., Disp: 90 Tablet, Rfl: 3    potassium chloride SA (KDUR) 20 MEQ Tab CR, Take 1 Tablet by mouth 2 times a day., Disp: 60 Tablet, Rfl: 2    scopolamine (TRANSDERM SCOP, 1.5 MG,) 1 mg/72hr PATCH 72 HR, Place 1 Patch on the skin every 72 hours., Disp: 4 Patch, Rfl: 3    cephALEXin (KEFLEX) 500 MG Cap, Take 1 Capsule by mouth 4 times a day., Disp: 28 Capsule, Rfl: 0    Azelastine (ASTELIN) 137 MCG/SPRAY Solution, Administer 1 Spray into each nostril at bedtime., Disp: , Rfl:     fluticasone (FLONASE) 50 MCG/ACT nasal spray, Administer 1 Spray into each nostril at bedtime., Disp: , Rfl:     nystatin (MYCOSTATIN) 304390 UNIT/ML Suspension, Take 5 mL by mouth 4 times a day., Disp: 60 mL, Rfl: 0    prochlorperazine (COMPAZINE) 10 MG Tab, Take 1 Tablet by mouth every 6 hours as needed for Nausea/Vomiting., Disp: 30 Tablet, Rfl: 3    traZODone (DESYREL) 50 MG Tab, Take 1 Tablet by mouth every evening., Disp: 90 Tablet, Rfl: 3    ondansetron (ZOFRAN ODT) 4 MG TABLET DISPERSIBLE, Take 1 Tablet by mouth every 6 hours as needed for Nausea/Vomiting., Disp: 20 Tablet, Rfl: 5    granisetron (KYTRIL) 1 MG Tab, Take 1 Tablet by mouth every 12 hours as needed for Nausea/Vomiting., Disp: 10 Tablet, Rfl: 0    metoprolol tartrate (LOPRESSOR) 50 MG Tab, Take 1 Tablet by mouth 2 times a day., Disp: 180 Tablet, Rfl: 2    levalbuterol (XOPENEX HFA) 45 MCG/ACT inhaler, Inhale 2 Puffs every four hours as needed for Shortness of Breath (As needed for shortness of breath, cough, wheezing.)., Disp: 1 Each, Rfl: 11    Multiple Vitamins-Minerals (CENTRUM SILVER 50+WOMEN) Tab, Take 1 Tablet by mouth 2 times a day. GUMMIES., Disp: , Rfl:     ZINC PICOLINATE PO, Take 1 Tablet by mouth 2 times a day., Disp: , Rfl:     Ascorbic Acid (VITAMIN C PO), Take 1 Tablet by mouth  2 times a day., Disp: , Rfl:     Biotin w/ Vitamins C & E (HAIR SKIN & NAILS GUMMIES PO), Take 1 Tablet by mouth 2 times a day., Disp: , Rfl:     naratriptan (AMERGE) 2.5 MG tablet, Take 1 Tablet by mouth as needed for Migraine., Disp: 5 Tablet, Rfl: 3    PROBLEMS:  Patient Active Problem List    Diagnosis Date Noted    Thrombocytopenia ([facility]) 12/11/2023    Chronic diastolic congestive heart failure ([facility]) 12/11/2023    Chronic kidney disease, stage 3b ([facility]) 11/22/2023    Transient alteration of awareness 10/26/2023    Achalasia 10/18/2023    Esophageal candidiasis ([facility]) 02/08/2023    Sacroiliac joint pain 11/10/2022    Esophageal stricture 10/22/2022    Dysphagia 10/21/2022    Drug-induced lupus erythematosus 09/27/2022    Long-term use of hydroxychloroquine 09/27/2022    Fibromyalgia syndrome 09/27/2022    Positive cardiolipin antibodies (IgA and IgG anti-CL) 07/27/2022    Positive VAHID (1:640 homogenous pattern with negative reflex) 07/27/2022    Inflammatory arthritis 07/27/2022    Mild tetrahydrocannabinol (THC) abuse 06/02/2022    Oropharyngeal dysphagia 06/02/2022    Acute pancreatitis 05/23/2022    Migraine 06/04/2019    Essential hypertension 05/20/2019    SIRS (systemic inflammatory response syndrome) ([facility]) 05/19/2019    History of MRSA infection 12/29/2018    History of infection with vancomycin resistant Enterococcus (VRE) 12/29/2018    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[facility]) 12/28/2018    Anemia 12/15/2018    Hyponatremia 12/12/2018    Leukocytosis 12/12/2018    Anxiety disorder due to multiple medical problems 12/01/2017    DDD (degenerative disc disease), lumbar 04/12/2017    History of DVT (deep vein thrombosis) 11/23/2016    Atrial fibrillation ([facility]) 03/26/2015    Sleep apnea 10/26/2014    Postherpetic neuralgia 06/24/2014    Multiple falls 06/24/2014    COPD (chronic obstructive pulmonary disease) ([facility]) 06/24/2014    Rosacea 06/24/2014    Ileostomy in place ([facility]) 06/26/2013    GERD (gastroesophageal reflux disease)  12/05/2012    Status post lumbar surgery 07/16/2012    Crohn's disease of small intestine with complication (HCC) 09/23/2009    Central sensitization to pain 09/23/2009    Opioid type dependence, continuous (CMS-HCC) 09/23/2009    Degenerative joint disease of cervical and lumbar spine 09/23/2009       REVIEW OF SYSTEMS:  Gen.:  No Nausea, Vomiting, fever, Chills.  Heart: No chest pain.  Lungs:  No shortness of Breath.  Psychological: Rg unusual Anxiety depression     PHYSICAL EXAM   Constitutional: Alert, cooperative, not in acute distress.  Cardiovascular:  Rate Rhythm is regular without murmurs rubs clicks.     Thorax & Lungs: Clear to auscultation, no wheezing, rhonchi, or rales  HENT: Normocephalic, Atraumatic.  Eyes: PERRLA, EOMI, Conjunctiva normal.   Neck: Trachia is midline no swelling of the thyroid.   Lymphatic: No lymphadenopathy noted.   Musculoskeletal: Complaints of right SI joint pain  Neurologic: Alert & oriented x 3, cranial nerves II through XII are intact, Normal motor function, Normal sensory function, No focal deficits noted.   Psychiatric: Affect normal, Judgment normal, Mood normal.     VITAL SIGNS:/70 (BP Location: Left arm, Patient Position: Sitting, BP Cuff Size: Adult)   Pulse 74   Temp 36.8 °C (98.3 °F)   Resp 16   Ht 1.829 m (6')   Wt 67.6 kg (149 lb)   SpO2 96%   BMI 20.21 kg/m²     Labs: Reviewed    Assessment:                                                     Plan:    1. Nausea  Continue scopolamine    2. Crohn's disease of small intestine with complication (HCC)  Continue Roxicodone  - oxyCODONE immediate release (ROXICODONE) 10 MG immediate release tablet; Take 1 Tablet by mouth every 6 hours as needed for Severe Pain (Refill #3 of #3.) for up to 30 days.  Dispense: 120 Tablet; Refill: 0    3. Esophageal stricture  Referral back to gastroenterology upon request state drug task force reviewed  - oxyCODONE immediate release (ROXICODONE) 10 MG immediate release  tablet; Take 1 Tablet by mouth every 6 hours as needed for Severe Pain (Refill #3 of #3.) for up to 30 days.  Dispense: 120 Tablet; Refill: 0    4. Ileostomy in place (HCC)  Continue Roxicodone  - oxyCODONE immediate release (ROXICODONE) 10 MG immediate release tablet; Take 1 Tablet by mouth every 6 hours as needed for Severe Pain (Refill #3 of #3.) for up to 30 days.  Dispense: 120 Tablet; Refill: 0    5. Anemia, unspecified type  No change in medical therapy  - oxyCODONE immediate release (ROXICODONE) 10 MG immediate release tablet; Take 1 Tablet by mouth every 6 hours as needed for Severe Pain (Refill #3 of #3.) for up to 30 days.  Dispense: 120 Tablet; Refill: 0    6. Spasm  Continue Valium  - diazePAM (VALIUM) 5 MG Tab; Take 1 Tablet by mouth every 6 hours as needed for Anxiety for up to 30 days.  Dispense: 60 Tablet; Refill: 2    7. Thrombocytopenia (HCC)  No change in medical therapy per last CBC

## 2023-12-26 NOTE — TELEPHONE ENCOUNTER
Phone Number Called: [phone]    Call outcome: Spoke to patient regarding message below.    Message: Called to confirm patient received message regarding taking pill every other day. Pt stated she isn't having a good day and did not have much to say as she is very frustrated with [facility] and their doctors. I apologized that she is frustrated. Pt made clear she would like to get off the phone and stated for nurse to have a good day.    Pt MAR will be updated

## 2024-01-03 DIAGNOSIS — K50.019 CROHN'S DISEASE OF SMALL INTESTINE WITH COMPLICATION (HCC): ICD-10-CM

## 2024-01-03 DIAGNOSIS — K22.2 ESOPHAGEAL STRICTURE: ICD-10-CM

## 2024-01-03 DIAGNOSIS — Z93.2 ILEOSTOMY IN PLACE (HCC): ICD-10-CM

## 2024-01-03 DIAGNOSIS — D64.9 ANEMIA, UNSPECIFIED TYPE: ICD-10-CM

## 2024-01-03 RX ORDER — OXYCODONE HYDROCHLORIDE 10 MG/1
10 TABLET ORAL EVERY 6 HOURS PRN
Qty: 120 TABLET | Refills: 0 | Status: SHIPPED | OUTPATIENT
Start: 2024-01-03 | End: 2024-02-02

## 2024-01-09 NOTE — PATIENT INSTRUCTIONS
AFTER VISIT INSTRUCTIONS    Below are important information to help you navigate your healthcare needs and help us serve you safely and effectively:  If laboratory tests and/or imaging studies were ordered, remember to go get them done.  If new prescriptions or refills were sent to the pharmacy, remember to go pick them up.  Take your medications exactly as prescribed unless instructed otherwise.  If there are significant findings on your lab tests and imaging studies that warrant further action, I will notify you with explanations via [employer] or phone call, otherwise you can view them on oort In[employer] and let me know if you have any questions.  Sign up for [employer] if you have not already done so, in order to have access to the results of your lab tests and imaging studies, and to be able to send and receive messages from me.  Note that [employer] messages are typically read during office hours only and may take 1-7 days before a response depending on the urgency of the situation and how busy my schedule is.  In general, [employer] messaging is for non-urgent matters that do not require immediate attention, so for urgent matters that cannot wait, you are advised to go to an urgent care.   No indicators present

## 2024-01-29 DIAGNOSIS — E43 EDEMA DUE TO MALNUTRITION, DUE TO UNSPECIFIED MALNUTRITION TYPE (HCC): ICD-10-CM

## 2024-01-29 DIAGNOSIS — N18.32 CHRONIC KIDNEY DISEASE, STAGE 3B: ICD-10-CM

## 2024-01-29 DIAGNOSIS — I10 ESSENTIAL HYPERTENSION: ICD-10-CM

## 2024-01-29 RX ORDER — POTASSIUM CHLORIDE 20 MEQ/1
20 TABLET, EXTENDED RELEASE ORAL 2 TIMES DAILY
Qty: 180 TABLET | Refills: 1 | Status: SHIPPED | OUTPATIENT
Start: 2024-01-29

## 2024-01-29 NOTE — TELEPHONE ENCOUNTER
JM    Received request via: Patient    Was the patient seen in the last year in this department? Yes    Does the patient have an active prescription (recently filled or refills available) for medication(s) requested? No    Pharmacy Name:     [id number] - [place], [place] - [address] AT [facility] [[id number]]     Does the patient have retirement Plus and need 100 day supply (blood pressure, diabetes and cholesterol meds only)? Patient does not have SCP

## 2024-01-29 NOTE — TELEPHONE ENCOUNTER
Received request via: Patient    Was the patient seen in the last year in this department? Yes    Does the patient have an active prescription (recently filled or refills available) for medication(s) requested? No    Pharmacy Name: [facility]    Does the patient have FDC Plus and need 100 day supply (blood pressure, diabetes and cholesterol meds only)? Patient does not have SCP

## 2024-01-30 DIAGNOSIS — I10 ESSENTIAL HYPERTENSION: ICD-10-CM

## 2024-01-30 RX ORDER — SPIRONOLACTONE 25 MG/1
12.5 TABLET ORAL DAILY
Qty: 90 TABLET | Refills: 1 | Status: SHIPPED | OUTPATIENT
Start: 2024-01-30

## 2024-01-30 RX ORDER — METOPROLOL TARTRATE 50 MG/1
50 TABLET, FILM COATED ORAL 2 TIMES DAILY
Qty: 180 TABLET | Refills: 3 | Status: SHIPPED | OUTPATIENT
Start: 2024-01-30

## 2024-01-30 NOTE — TELEPHONE ENCOUNTER
Is the patient due for a refill? Yes    Was the patient seen the past year? Yes    Date of last office visit: 12/11/23    Does the patient have an upcoming appointment?  Yes   If yes, When? 5/7/24    Provider to refill:FELICITA    Does the patients insurance require a 100 day supply?  No

## 2024-01-31 RX ORDER — FUROSEMIDE 20 MG/1
TABLET ORAL
Qty: 90 TABLET | OUTPATIENT
Start: 2024-01-31

## 2024-02-02 ENCOUNTER — OFFICE VISIT (OUTPATIENT)
Dept: MEDICAL GROUP | Facility: LAB | Age: 69
End: 2024-02-02
Payer: MEDICARE

## 2024-02-02 VITALS
TEMPERATURE: 98.1 F | WEIGHT: 148 LBS | HEIGHT: 72 IN | OXYGEN SATURATION: 97 % | SYSTOLIC BLOOD PRESSURE: 108 MMHG | RESPIRATION RATE: 14 BRPM | DIASTOLIC BLOOD PRESSURE: 70 MMHG | HEART RATE: 67 BPM | BODY MASS INDEX: 20.05 KG/M2

## 2024-02-02 DIAGNOSIS — K63.8219 SMALL INTESTINAL BACTERIAL OVERGROWTH (SIBO): ICD-10-CM

## 2024-02-02 PROCEDURE — 3074F SYST BP LT 130 MM HG: CPT | Performed by: INTERNAL MEDICINE

## 2024-02-02 PROCEDURE — 99213 OFFICE O/P EST LOW 20 MIN: CPT | Performed by: INTERNAL MEDICINE

## 2024-02-02 PROCEDURE — 3078F DIAST BP <80 MM HG: CPT | Performed by: INTERNAL MEDICINE

## 2024-02-02 RX ORDER — CEPHALEXIN 500 MG/1
500 CAPSULE ORAL 4 TIMES DAILY
Qty: 28 CAPSULE | Refills: 0 | Status: SHIPPED | OUTPATIENT
Start: 2024-02-02

## 2024-02-02 ASSESSMENT — FIBROSIS 4 INDEX: FIB4 SCORE: 2.8

## 2024-02-03 NOTE — PROGRESS NOTES
CC: Jana Overton is a 68 y.o. female is suffering from   Chief Complaint   Patient presents with    Follow-Up         SUBJECTIVE:  1. Small intestinal bacterial overgrowth (SIBO)  Jana is here for follow-up has small intestinal bacterial overgrowth likely, states that her ileostomy is starting to leak and has a foul smell.  Patient states that previous treatment with Keflex really helped in November        Past social, family, history: , [facility]  Social History     Tobacco Use    Smoking status: Never    Smokeless tobacco: Never    Tobacco comments:     second hand smoke parents - smoked for only 1 year many years ago   Vaping Use    Vaping Use: Every day    Substances: THC   Substance Use Topics    Alcohol use: Not Currently     Alcohol/week: 0.6 oz     Types: 1 Shots of liquor per week     Comment: gin and half a lime; tonic water    Drug use: Yes     Types: Marijuana, Inhaled     Comment: medical marijuana through bong/vape         MEDICATIONS:    Current Outpatient Medications:     cephALEXin (KEFLEX) 500 MG Cap, Take 1 Capsule by mouth 4 times a day., Disp: 28 Capsule, Rfl: 0    spironolactone (ALDACTONE) 25 MG Tab, Take 0.5 Tablets by mouth every day., Disp: 90 Tablet, Rfl: 1    metoprolol tartrate (LOPRESSOR) 50 MG Tab, Take 1 Tablet by mouth 2 times a day., Disp: 180 Tablet, Rfl: 3    potassium chloride SA (KDUR) 20 MEQ Tab CR, TAKE 1 TABLET BY MOUTH TWICE DAILY, Disp: 180 Tablet, Rfl: 1    oxyCODONE immediate release (ROXICODONE) 10 MG immediate release tablet, Take 1 Tablet by mouth every 6 hours as needed for Severe Pain (Refill #3 of #3.) for up to 30 days., Disp: 120 Tablet, Rfl: 0    scopolamine (TRANSDERM SCOP, 1.5 MG,) 1 mg/72hr PATCH 72 HR, Place 1 Patch on the skin every 72 hours., Disp: 4 Patch, Rfl: 3    furosemide (LASIX) 20 MG Tab, Take 1 Tablet by mouth every day., Disp: 100 Tablet, Rfl: 3    diazePAM (VALIUM) 5 MG Tab, Take 5 mg by mouth every 6 hours as needed (MUSCLE SPASMS)., Disp:  , Rfl:     hydroxychloroquine (PLAQUENIL) 200 MG Tab, Take 1 Tablet by mouth every day., Disp: 90 Tablet, Rfl: 3    scopolamine (TRANSDERM SCOP, 1.5 MG,) 1 mg/72hr PATCH 72 HR, Place 1 Patch on the skin every 72 hours., Disp: 4 Patch, Rfl: 3    Azelastine (ASTELIN) 137 MCG/SPRAY Solution, Administer 1 Spray into each nostril at bedtime., Disp: , Rfl:     fluticasone (FLONASE) 50 MCG/ACT nasal spray, Administer 1 Spray into each nostril at bedtime., Disp: , Rfl:     nystatin (MYCOSTATIN) 892160 UNIT/ML Suspension, Take 5 mL by mouth 4 times a day., Disp: 60 mL, Rfl: 0    prochlorperazine (COMPAZINE) 10 MG Tab, Take 1 Tablet by mouth every 6 hours as needed for Nausea/Vomiting., Disp: 30 Tablet, Rfl: 3    traZODone (DESYREL) 50 MG Tab, Take 1 Tablet by mouth every evening., Disp: 90 Tablet, Rfl: 3    ondansetron (ZOFRAN ODT) 4 MG TABLET DISPERSIBLE, Take 1 Tablet by mouth every 6 hours as needed for Nausea/Vomiting., Disp: 20 Tablet, Rfl: 5    granisetron (KYTRIL) 1 MG Tab, Take 1 Tablet by mouth every 12 hours as needed for Nausea/Vomiting., Disp: 10 Tablet, Rfl: 0    levalbuterol (XOPENEX HFA) 45 MCG/ACT inhaler, Inhale 2 Puffs every four hours as needed for Shortness of Breath (As needed for shortness of breath, cough, wheezing.)., Disp: 1 Each, Rfl: 11    Multiple Vitamins-Minerals (CENTRUM SILVER 50+WOMEN) Tab, Take 1 Tablet by mouth 2 times a day. GUMMIES., Disp: , Rfl:     ZINC PICOLINATE PO, Take 1 Tablet by mouth 2 times a day., Disp: , Rfl:     Ascorbic Acid (VITAMIN C PO), Take 1 Tablet by mouth 2 times a day., Disp: , Rfl:     Biotin w/ Vitamins C & E (HAIR SKIN & NAILS GUMMIES PO), Take 1 Tablet by mouth 2 times a day., Disp: , Rfl:     naratriptan (AMERGE) 2.5 MG tablet, Take 1 Tablet by mouth as needed for Migraine., Disp: 5 Tablet, Rfl: 3    PROBLEMS:  Patient Active Problem List    Diagnosis Date Noted    Thrombocytopenia (HCC) 12/11/2023    Chronic diastolic congestive heart failure ([facility]) 12/11/2023     Chronic kidney disease, stage 3b ([facility]) 11/22/2023    Transient alteration of awareness 10/26/2023    Achalasia 10/18/2023    Esophageal candidiasis ([facility]) 02/08/2023    Sacroiliac joint pain 11/10/2022    Esophageal stricture 10/22/2022    Dysphagia 10/21/2022    Drug-induced lupus erythematosus 09/27/2022    Long-term use of hydroxychloroquine 09/27/2022    Fibromyalgia syndrome 09/27/2022    Positive cardiolipin antibodies (IgA and IgG anti-CL) 07/27/2022    Positive VAHID (1:640 homogenous pattern with negative reflex) 07/27/2022    Inflammatory arthritis 07/27/2022    Mild tetrahydrocannabinol (THC) abuse 06/02/2022    Oropharyngeal dysphagia 06/02/2022    Acute pancreatitis 05/23/2022    Migraine 06/04/2019    Essential hypertension 05/20/2019    SIRS (systemic inflammatory response syndrome) ([facility]) 05/19/2019    History of MRSA infection 12/29/2018    History of infection with vancomycin resistant Enterococcus (VRE) 12/29/2018    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[facility]) 12/28/2018    Anemia 12/15/2018    Hyponatremia 12/12/2018    Leukocytosis 12/12/2018    Anxiety disorder due to multiple medical problems 12/01/2017    DDD (degenerative disc disease), lumbar 04/12/2017    History of DVT (deep vein thrombosis) 11/23/2016    Atrial fibrillation ([facility]) 03/26/2015    Sleep apnea 10/26/2014    Postherpetic neuralgia 06/24/2014    Multiple falls 06/24/2014    COPD (chronic obstructive pulmonary disease) ([facility]) 06/24/2014    Rosacea 06/24/2014    Ileostomy in place ([facility]) 06/26/2013    GERD (gastroesophageal reflux disease) 12/05/2012    Status post lumbar surgery 07/16/2012    Crohn's disease of small intestine with complication ([facility]) 09/23/2009    Central sensitization to pain 09/23/2009    Opioid type dependence, continuous ([facility]) 09/23/2009    Degenerative joint disease of cervical and lumbar spine 09/23/2009       REVIEW OF SYSTEMS:  Gen.:  No Nausea, Vomiting, fever, Chills.  Heart: No chest pain.  Lungs:  No shortness of  Breath.  Psychological: Rg unusual Anxiety depression     PHYSICAL EXAM   Constitutional: Alert, cooperative, not in acute distress.  Cardiovascular:  Rate Rhythm is regular without murmurs rubs clicks.     Thorax & Lungs: Clear to auscultation, no wheezing, rhonchi, or rales  HENT: Normocephalic, Atraumatic.  Eyes: PERRLA, EOMI, Conjunctiva normal.   Neck: Trachia is midline no swelling of the thyroid.   Lymphatic: No lymphadenopathy noted.   Neurologic: Alert & oriented x 3, cranial nerves II through XII are intact, Normal motor function, Normal sensory function, No focal deficits noted.   Psychiatric: Affect normal, Judgment normal, Mood normal.     VITAL SIGNS:/70 (BP Location: Left arm, Patient Position: Sitting, BP Cuff Size: Adult)   Pulse 67   Temp 36.7 °C (98.1 °F)   Resp 14   Ht 1.829 m (6')   Wt 67.1 kg (148 lb)   SpO2 97%   BMI 20.07 kg/m²     Labs: Reviewed    Assessment:                                                     Plan:    1. Small intestinal bacterial overgrowth (SIBO)  Possible recurrent small intestinal bacterial overgrowth, patient with a history of greater than 20 abdominal surgeries, start Keflex  - cephALEXin (KEFLEX) 500 MG Cap; Take 1 Capsule by mouth 4 times a day.  Dispense: 28 Capsule; Refill: 0

## 2024-02-21 DIAGNOSIS — K22.0 ACHALASIA: ICD-10-CM

## 2024-02-21 NOTE — PROGRESS NOTES
Notified by [facility] that they need a referral to [facility] due to severe Achalasia.

## 2024-02-22 ENCOUNTER — PATIENT MESSAGE (OUTPATIENT)
Dept: MEDICAL GROUP | Facility: LAB | Age: 69
End: 2024-02-22
Payer: MEDICARE

## 2024-02-29 ENCOUNTER — TELEPHONE (OUTPATIENT)
Dept: CARDIOLOGY | Facility: MEDICAL CENTER | Age: 69
End: 2024-02-29
Payer: MEDICARE

## 2024-02-29 ENCOUNTER — HOSPITAL ENCOUNTER (OUTPATIENT)
Dept: LAB | Facility: MEDICAL CENTER | Age: 69
End: 2024-02-29
Attending: INTERNAL MEDICINE
Payer: MEDICARE

## 2024-02-29 DIAGNOSIS — Z79.899 LONG-TERM USE OF HYDROXYCHLOROQUINE: ICD-10-CM

## 2024-02-29 DIAGNOSIS — I48.91 ATRIAL FIBRILLATION, UNSPECIFIED TYPE (HCC): ICD-10-CM

## 2024-02-29 DIAGNOSIS — Z86.718 HISTORY OF DVT (DEEP VEIN THROMBOSIS): ICD-10-CM

## 2024-02-29 DIAGNOSIS — K50.019 CROHN'S DISEASE OF SMALL INTESTINE WITH COMPLICATION (HCC): ICD-10-CM

## 2024-02-29 DIAGNOSIS — D69.6 THROMBOCYTOPENIA (HCC): ICD-10-CM

## 2024-02-29 DIAGNOSIS — N18.32 CHRONIC KIDNEY DISEASE, STAGE 3B: ICD-10-CM

## 2024-02-29 DIAGNOSIS — I50.32 CHRONIC DIASTOLIC CONGESTIVE HEART FAILURE (HCC): ICD-10-CM

## 2024-02-29 LAB
ANION GAP SERPL CALC-SCNC: 15 MMOL/L (ref 7–16)
BUN SERPL-MCNC: 25 MG/DL (ref 8–22)
CALCIUM SERPL-MCNC: 10 MG/DL (ref 8.5–10.5)
CHLORIDE SERPL-SCNC: 101 MMOL/L (ref 96–112)
CO2 SERPL-SCNC: 23 MMOL/L (ref 20–33)
CREAT SERPL-MCNC: 1.16 MG/DL (ref 0.5–1.4)
ERYTHROCYTE [DISTWIDTH] IN BLOOD BY AUTOMATED COUNT: 42.9 FL (ref 35.9–50)
GFR SERPLBLD CREATININE-BSD FMLA CKD-EPI: 51 ML/MIN/1.73 M 2
GLUCOSE SERPL-MCNC: 63 MG/DL (ref 65–99)
HCT VFR BLD AUTO: 45.2 % (ref 37–47)
HGB BLD-MCNC: 14.9 G/DL (ref 12–16)
MAGNESIUM SERPL-MCNC: 1.8 MG/DL (ref 1.5–2.5)
MCH RBC QN AUTO: 30.3 PG (ref 27–33)
MCHC RBC AUTO-ENTMCNC: 33 G/DL (ref 32.2–35.5)
MCV RBC AUTO: 91.9 FL (ref 81.4–97.8)
PLATELET # BLD AUTO: 234 K/UL (ref 164–446)
PMV BLD AUTO: 10.9 FL (ref 9–12.9)
POTASSIUM SERPL-SCNC: 3.8 MMOL/L (ref 3.6–5.5)
RBC # BLD AUTO: 4.92 M/UL (ref 4.2–5.4)
SODIUM SERPL-SCNC: 139 MMOL/L (ref 135–145)
WBC # BLD AUTO: 8.4 K/UL (ref 4.8–10.8)

## 2024-02-29 PROCEDURE — 83735 ASSAY OF MAGNESIUM: CPT

## 2024-02-29 PROCEDURE — 85027 COMPLETE CBC AUTOMATED: CPT

## 2024-02-29 PROCEDURE — 80048 BASIC METABOLIC PNL TOTAL CA: CPT

## 2024-02-29 PROCEDURE — 36415 COLL VENOUS BLD VENIPUNCTURE: CPT

## 2024-02-29 NOTE — TELEPHONE ENCOUNTER
Called pt in regards to lab work that was ordered at previous OV. Patient states She will go into [facility] lab today or Friday to get the lab work completed.  Pt has follow up appointment scheduled with  on 02.04.2024.

## 2024-03-06 DIAGNOSIS — K50.019 CROHN'S DISEASE OF SMALL INTESTINE WITH COMPLICATION (HCC): ICD-10-CM

## 2024-03-06 DIAGNOSIS — K21.9 GASTROESOPHAGEAL REFLUX DISEASE WITHOUT ESOPHAGITIS: ICD-10-CM

## 2024-03-06 RX ORDER — OXYCODONE HYDROCHLORIDE 10 MG/1
10 TABLET ORAL EVERY 6 HOURS PRN
Qty: 120 TABLET | Refills: 0 | Status: SHIPPED | OUTPATIENT
Start: 2024-03-06 | End: 2024-04-05

## 2024-03-06 RX ORDER — OXYCODONE HYDROCHLORIDE 10 MG/1
TABLET ORAL
Qty: 28 TABLET | OUTPATIENT
Start: 2024-03-06

## 2024-03-06 RX ORDER — OXYCODONE HYDROCHLORIDE 10 MG/1
TABLET ORAL
COMMUNITY
Start: 2024-02-02 | End: 2024-03-06 | Stop reason: SDUPTHER

## 2024-03-06 NOTE — TELEPHONE ENCOUNTER
Received request via: Pharmacy    Was the patient seen in the last year in this department? Yes    Does the patient have an active prescription (recently filled or refills available) for medication(s) requested? No    Pharmacy Name: [facility]     Does the patient have [facility] Plus and need 100 day supply (blood pressure, diabetes and cholesterol meds only)? Patient does not have SCP

## 2024-04-02 ENCOUNTER — APPOINTMENT (RX ONLY)
Dept: URBAN - METROPOLITAN AREA CLINIC 20 | Facility: CLINIC | Age: 69
Setting detail: DERMATOLOGY
End: 2024-04-02

## 2024-04-02 DIAGNOSIS — L82.1 OTHER SEBORRHEIC KERATOSIS: ICD-10-CM

## 2024-04-02 DIAGNOSIS — D485 NEOPLASM OF UNCERTAIN BEHAVIOR OF SKIN: ICD-10-CM

## 2024-04-02 DIAGNOSIS — Z71.89 OTHER SPECIFIED COUNSELING: ICD-10-CM

## 2024-04-02 DIAGNOSIS — L57.0 ACTINIC KERATOSIS: ICD-10-CM

## 2024-04-02 DIAGNOSIS — D18.0 HEMANGIOMA: ICD-10-CM

## 2024-04-02 DIAGNOSIS — L81.4 OTHER MELANIN HYPERPIGMENTATION: ICD-10-CM

## 2024-04-02 DIAGNOSIS — D22 MELANOCYTIC NEVI: ICD-10-CM

## 2024-04-02 DIAGNOSIS — Z85.828 PERSONAL HISTORY OF OTHER MALIGNANT NEOPLASM OF SKIN: ICD-10-CM

## 2024-04-02 PROBLEM — D22.71 MELANOCYTIC NEVI OF RIGHT LOWER LIMB, INCLUDING HIP: Status: ACTIVE | Noted: 2024-04-02

## 2024-04-02 PROBLEM — D22.72 MELANOCYTIC NEVI OF LEFT LOWER LIMB, INCLUDING HIP: Status: ACTIVE | Noted: 2024-04-02

## 2024-04-02 PROBLEM — D22.62 MELANOCYTIC NEVI OF LEFT UPPER LIMB, INCLUDING SHOULDER: Status: ACTIVE | Noted: 2024-04-02

## 2024-04-02 PROBLEM — D22.61 MELANOCYTIC NEVI OF RIGHT UPPER LIMB, INCLUDING SHOULDER: Status: ACTIVE | Noted: 2024-04-02

## 2024-04-02 PROBLEM — D22.5 MELANOCYTIC NEVI OF TRUNK: Status: ACTIVE | Noted: 2024-04-02

## 2024-04-02 PROBLEM — D22.39 MELANOCYTIC NEVI OF OTHER PARTS OF FACE: Status: ACTIVE | Noted: 2024-04-02

## 2024-04-02 PROBLEM — D48.5 NEOPLASM OF UNCERTAIN BEHAVIOR OF SKIN: Status: ACTIVE | Noted: 2024-04-02

## 2024-04-02 PROBLEM — D18.01 HEMANGIOMA OF SKIN AND SUBCUTANEOUS TISSUE: Status: ACTIVE | Noted: 2024-04-02

## 2024-04-02 PROCEDURE — 17000 DESTRUCT PREMALG LESION: CPT | Mod: 59

## 2024-04-02 PROCEDURE — ? LIQUID NITROGEN

## 2024-04-02 PROCEDURE — ? SUNSCREEN RECOMMENDATIONS

## 2024-04-02 PROCEDURE — ? BIOPSY BY SHAVE METHOD

## 2024-04-02 PROCEDURE — 17003 DESTRUCT PREMALG LES 2-14: CPT

## 2024-04-02 PROCEDURE — 99213 OFFICE O/P EST LOW 20 MIN: CPT | Mod: 25

## 2024-04-02 PROCEDURE — 11102 TANGNTL BX SKIN SINGLE LES: CPT

## 2024-04-02 PROCEDURE — ? COUNSELING

## 2024-04-02 ASSESSMENT — LOCATION DETAILED DESCRIPTION DERM
LOCATION DETAILED: RIGHT DISTAL POSTERIOR UPPER ARM
LOCATION DETAILED: LEFT NASAL ALA
LOCATION DETAILED: LEFT PROXIMAL POSTERIOR UPPER ARM
LOCATION DETAILED: RIGHT DISTAL POSTERIOR THIGH
LOCATION DETAILED: LEFT VENTRAL PROXIMAL FOREARM
LOCATION DETAILED: RIGHT INFERIOR CENTRAL MALAR CHEEK
LOCATION DETAILED: INFERIOR THORACIC SPINE
LOCATION DETAILED: RIGHT INFERIOR MEDIAL UPPER BACK
LOCATION DETAILED: LEFT DISTAL POSTERIOR THIGH
LOCATION DETAILED: RIGHT INFERIOR MEDIAL MIDBACK
LOCATION DETAILED: LEFT SUPERIOR MEDIAL MALAR CHEEK
LOCATION DETAILED: RIGHT VENTRAL PROXIMAL FOREARM
LOCATION DETAILED: RIGHT SUPERIOR UPPER BACK
LOCATION DETAILED: RIGHT PROXIMAL PRETIBIAL REGION
LOCATION DETAILED: LEFT LATERAL PROXIMAL PRETIBIAL REGION
LOCATION DETAILED: RIGHT INFERIOR FOREHEAD
LOCATION DETAILED: RIGHT MID-UPPER BACK

## 2024-04-02 ASSESSMENT — LOCATION SIMPLE DESCRIPTION DERM
LOCATION SIMPLE: LEFT PRETIBIAL REGION
LOCATION SIMPLE: RIGHT CHEEK
LOCATION SIMPLE: LEFT FOREARM
LOCATION SIMPLE: RIGHT PRETIBIAL REGION
LOCATION SIMPLE: LEFT NOSE
LOCATION SIMPLE: UPPER BACK
LOCATION SIMPLE: LEFT POSTERIOR UPPER ARM
LOCATION SIMPLE: RIGHT UPPER BACK
LOCATION SIMPLE: RIGHT POSTERIOR THIGH
LOCATION SIMPLE: RIGHT LOWER BACK
LOCATION SIMPLE: RIGHT FOREHEAD
LOCATION SIMPLE: LEFT CHEEK
LOCATION SIMPLE: RIGHT FOREARM
LOCATION SIMPLE: RIGHT POSTERIOR UPPER ARM
LOCATION SIMPLE: LEFT POSTERIOR THIGH

## 2024-04-02 ASSESSMENT — LOCATION ZONE DERM
LOCATION ZONE: TRUNK
LOCATION ZONE: LEG
LOCATION ZONE: NOSE
LOCATION ZONE: FACE
LOCATION ZONE: ARM

## 2024-04-02 NOTE — PROCEDURE: LIQUID NITROGEN
Number Of Freeze-Thaw Cycles: 2 freeze-thaw cycles
Render Post-Care Instructions In Note?: yes
Consent: The patient's consent was obtained including but not limited to risks of crusting, scabbing, blistering, scarring, darker or lighter pigmentary change, recurrence, incomplete removal and infection. RTC in 2 months if lesion(s) persistent.
Detail Level: Detailed
Render Note In Bullet Format When Appropriate: No
Duration Of Freeze Thaw-Cycle (Seconds): 10
Post-Care Instructions: I reviewed with the patient in detail post-care instructions. Patient is to wear sunprotection, and avoid picking at any of the treated lesions. Pt may apply Vaseline to crusted or scabbing areas.

## 2024-04-05 DIAGNOSIS — I10 ESSENTIAL HYPERTENSION: ICD-10-CM

## 2024-04-05 RX ORDER — SPIRONOLACTONE 25 MG/1
25 TABLET ORAL DAILY
Qty: 90 TABLET | Refills: 0 | Status: SHIPPED | OUTPATIENT
Start: 2024-04-05

## 2024-04-05 NOTE — TELEPHONE ENCOUNTER
MT (ADA): FELICITA OOO    Pt back to taking 25mg Aldactone medication everyday instead of every other day.  Has taken every day for years, but around 12/2023 per FELICITA, she tried to half dosage by taking it every other day. This didn't work for her.  Pt currently out of meds, as message wasn't relayed that patient needed to go back to full dosage. Please advise if okay to refill for 25mg daily or any other recommendations.  See below    ===========================    Phone Number Called: [phone]    Call outcome: Spoke to patient regarding message below.    Message: Pt was taking medication every other day, d/t not being able to split pill in half. Pt stated, since January, she has been taking one pill a day because the every other day did not work for her and she retained fluid. She has been on Aldactone 25mg daily for years prior to the dosage change in December 2023    Pt called 01/29/24(see refill encounter), but message about changing prescription to 1 pill a day was not relayed. Refill request filled as 12.5mg everyday, instead of 25mg every other day

## 2024-04-06 DIAGNOSIS — K50.019 CROHN'S DISEASE OF SMALL INTESTINE WITH COMPLICATION (HCC): ICD-10-CM

## 2024-04-06 DIAGNOSIS — K21.9 GASTROESOPHAGEAL REFLUX DISEASE WITHOUT ESOPHAGITIS: ICD-10-CM

## 2024-04-08 RX ORDER — OXYCODONE HYDROCHLORIDE 10 MG/1
10 TABLET ORAL EVERY 6 HOURS PRN
Qty: 120 TABLET | Refills: 0 | Status: SHIPPED | OUTPATIENT
Start: 2024-04-08 | End: 2024-05-08

## 2024-04-08 NOTE — TELEPHONE ENCOUNTER
Received request via: Pharmacy    Was the patient seen in the last year in this department? Yes    Does the patient have an active prescription (recently filled or refills available) for medication(s) requested? No    Pharmacy Name: [facility]    Does the patient have [facility] home Plus and need 100 day supply (blood pressure, diabetes and cholesterol meds only)? Patient does not have SCP

## 2024-05-03 ENCOUNTER — OFFICE VISIT (OUTPATIENT)
Dept: MEDICAL GROUP | Facility: LAB | Age: 69
End: 2024-05-03
Payer: MEDICARE

## 2024-05-03 VITALS
BODY MASS INDEX: 20.86 KG/M2 | RESPIRATION RATE: 16 BRPM | HEIGHT: 72 IN | DIASTOLIC BLOOD PRESSURE: 74 MMHG | OXYGEN SATURATION: 98 % | WEIGHT: 154 LBS | TEMPERATURE: 98.6 F | HEART RATE: 76 BPM | SYSTOLIC BLOOD PRESSURE: 106 MMHG

## 2024-05-03 DIAGNOSIS — K22.2 ESOPHAGEAL STRICTURE: ICD-10-CM

## 2024-05-03 PROCEDURE — 3074F SYST BP LT 130 MM HG: CPT | Performed by: INTERNAL MEDICINE

## 2024-05-03 PROCEDURE — 3078F DIAST BP <80 MM HG: CPT | Performed by: INTERNAL MEDICINE

## 2024-05-03 PROCEDURE — 99213 OFFICE O/P EST LOW 20 MIN: CPT | Performed by: INTERNAL MEDICINE

## 2024-05-03 ASSESSMENT — FIBROSIS 4 INDEX: FIB4 SCORE: 1.81

## 2024-05-04 NOTE — PROGRESS NOTES
CC: Jana Overton is a 68 y.o. female is suffering from   Chief Complaint   Patient presents with    Follow-Up         SUBJECTIVE:  1. Esophageal stricture  Jana is here for follow-up, has undergone evaluation by Dr. Camron Tang at [facility].  Patient needs to have the manometry completed again previous testing was done greater than 10 years prior    History of Present Illness  The patient presents for evaluation of multiple medical concerns. He is accompanied by an adult female.    The patient's abdominal discomfort is currently well-managed. He underwent a video consultation with Dr. Camron Tang at [facility], during which a stricture was suspected. A manometry test was recommended. The patient expresses dissatisfaction with local gastroenterologists and has not sought consultation in [place]. He has a history of Crohn's disease and has an ileostomy in place.    Past social, family, history:    Social History     Tobacco Use    Smoking status: Never    Smokeless tobacco: Never    Tobacco comments:     second hand smoke parents - smoked for only 1 year many years ago   Vaping Use    Vaping Use: Every day    Substances: THC   Substance Use Topics    Alcohol use: Not Currently     Alcohol/week: 0.6 oz     Types: 1 Shots of liquor per week     Comment: gin and half a lime; tonic water    Drug use: Yes     Types: Marijuana, Inhaled     Comment: medical marijuana through bong/vape         MEDICATIONS:    Current Outpatient Medications:     oxyCODONE immediate release (ROXICODONE) 10 MG immediate release tablet, Take 1 Tablet by mouth every 6 hours as needed for Moderate Pain for up to 30 days., Disp: 120 Tablet, Rfl: 0    spironolactone (ALDACTONE) 25 MG Tab, Take 1 Tablet by mouth every day., Disp: 90 Tablet, Rfl: 0    cephALEXin (KEFLEX) 500 MG Cap, Take 1 Capsule by mouth 4 times a day., Disp: 28 Capsule, Rfl: 0    metoprolol tartrate (LOPRESSOR) 50 MG Tab, Take 1 Tablet by mouth 2 times a day., Disp: 180  Tablet, Rfl: 3    potassium chloride SA (KDUR) 20 MEQ Tab CR, TAKE 1 TABLET BY MOUTH TWICE DAILY, Disp: 180 Tablet, Rfl: 1    scopolamine (TRANSDERM SCOP, 1.5 MG,) 1 mg/72hr PATCH 72 HR, Place 1 Patch on the skin every 72 hours., Disp: 4 Patch, Rfl: 3    furosemide (LASIX) 20 MG Tab, Take 1 Tablet by mouth every day., Disp: 100 Tablet, Rfl: 3    diazePAM (VALIUM) 5 MG Tab, Take 5 mg by mouth every 6 hours as needed (MUSCLE SPASMS)., Disp: , Rfl:     hydroxychloroquine (PLAQUENIL) 200 MG Tab, Take 1 Tablet by mouth every day., Disp: 90 Tablet, Rfl: 3    scopolamine (TRANSDERM SCOP, 1.5 MG,) 1 mg/72hr PATCH 72 HR, Place 1 Patch on the skin every 72 hours., Disp: 4 Patch, Rfl: 3    Azelastine (ASTELIN) 137 MCG/SPRAY Solution, Administer 1 Spray into each nostril at bedtime., Disp: , Rfl:     fluticasone (FLONASE) 50 MCG/ACT nasal spray, Administer 1 Spray into each nostril at bedtime., Disp: , Rfl:     nystatin (MYCOSTATIN) 272737 UNIT/ML Suspension, Take 5 mL by mouth 4 times a day., Disp: 60 mL, Rfl: 0    prochlorperazine (COMPAZINE) 10 MG Tab, Take 1 Tablet by mouth every 6 hours as needed for Nausea/Vomiting., Disp: 30 Tablet, Rfl: 3    traZODone (DESYREL) 50 MG Tab, Take 1 Tablet by mouth every evening., Disp: 90 Tablet, Rfl: 3    ondansetron (ZOFRAN ODT) 4 MG TABLET DISPERSIBLE, Take 1 Tablet by mouth every 6 hours as needed for Nausea/Vomiting., Disp: 20 Tablet, Rfl: 5    granisetron (KYTRIL) 1 MG Tab, Take 1 Tablet by mouth every 12 hours as needed for Nausea/Vomiting., Disp: 10 Tablet, Rfl: 0    levalbuterol (XOPENEX HFA) 45 MCG/ACT inhaler, Inhale 2 Puffs every four hours as needed for Shortness of Breath (As needed for shortness of breath, cough, wheezing.)., Disp: 1 Each, Rfl: 11    Multiple Vitamins-Minerals (CENTRUM SILVER 50+WOMEN) Tab, Take 1 Tablet by mouth 2 times a day. GUMMIES., Disp: , Rfl:     ZINC PICOLINATE PO, Take 1 Tablet by mouth 2 times a day., Disp: , Rfl:     Ascorbic Acid (VITAMIN C PO),  Take 1 Tablet by mouth 2 times a day., Disp: , Rfl:     Biotin w/ Vitamins C & E (HAIR SKIN & NAILS GUMMIES PO), Take 1 Tablet by mouth 2 times a day., Disp: , Rfl:     naratriptan (AMERGE) 2.5 MG tablet, Take 1 Tablet by mouth as needed for Migraine., Disp: 5 Tablet, Rfl: 3    PROBLEMS:  Patient Active Problem List    Diagnosis Date Noted    Thrombocytopenia ([facility]) 12/11/2023    Chronic diastolic congestive heart failure ([facility]) 12/11/2023    Chronic kidney disease, stage 3b 11/22/2023    Transient alteration of awareness 10/26/2023    Achalasia 10/18/2023    Esophageal candidiasis ([facility]) 02/08/2023    Sacroiliac joint pain 11/10/2022    Esophageal stricture 10/22/2022    Dysphagia 10/21/2022    Drug-induced lupus erythematosus 09/27/2022    Long-term use of hydroxychloroquine 09/27/2022    Fibromyalgia syndrome 09/27/2022    Positive cardiolipin antibodies (IgA and IgG anti-CL) 07/27/2022    Positive VAHID (1:640 homogenous pattern with negative reflex) 07/27/2022    Inflammatory arthritis 07/27/2022    Mild tetrahydrocannabinol (THC) abuse 06/02/2022    Oropharyngeal dysphagia 06/02/2022    Acute pancreatitis 05/23/2022    Migraine 06/04/2019    Essential hypertension 05/20/2019    SIRS (systemic inflammatory response syndrome) ([facility]) 05/19/2019    History of MRSA infection 12/29/2018    History of infection with vancomycin resistant Enterococcus (VRE) 12/29/2018    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[facility]) 12/28/2018    Anemia 12/15/2018    Hyponatremia 12/12/2018    Leukocytosis 12/12/2018    Anxiety disorder due to multiple medical problems 12/01/2017    DDD (degenerative disc disease), lumbar 04/12/2017    History of DVT (deep vein thrombosis) 11/23/2016    Atrial fibrillation ([facility]) 03/26/2015    Sleep apnea 10/26/2014    Postherpetic neuralgia 06/24/2014    Multiple falls 06/24/2014    COPD (chronic obstructive pulmonary disease) ([facility]) 06/24/2014    Rosacea 06/24/2014    Ileostomy in place ([facility]) 06/26/2013    GERD (gastroesophageal  reflux disease) 12/05/2012    Status post lumbar surgery 07/16/2012    Crohn's disease of small intestine with complication (HCC) 09/23/2009    Central sensitization to pain 09/23/2009    Opioid type dependence, continuous (CMS-[facility]) 09/23/2009    Degenerative joint disease of cervical and lumbar spine 09/23/2009       REVIEW OF SYSTEMS:  Gen.:  No Nausea, Vomiting, fever, Chills.  Heart: No chest pain.  Lungs:  No shortness of Breath.  Psychological: Rg unusual Anxiety depression     PHYSICAL EXAM   Physical Exam       Constitutional: Alert, cooperative, not in acute distress.  Cardiovascular:  Rate Rhythm is regular without murmurs rubs clicks.     Thorax & Lungs: Clear to auscultation, no wheezing, rhonchi, or rales  HENT: Normocephalic, Atraumatic.  Eyes: PERRLA, EOMI, Conjunctiva normal.   Neck: Trachia is midline no swelling of the thyroid.   Lymphatic: No lymphadenopathy noted.   Neurologic: Alert & oriented x 3, cranial nerves II through XII are intact, Normal motor function, Normal sensory function, No focal deficits noted.   Psychiatric: Affect normal, Judgment normal, Mood normal.     VITAL SIGNS:/74 (BP Location: Left arm, Patient Position: Sitting, BP Cuff Size: Adult)   Pulse 76   Temp 37 °C (98.6 °F)   Resp 16   Ht 1.829 m (6')   Wt 69.9 kg (154 lb)   SpO2 98%   BMI 20.89 kg/m²     Labs: Reviewed    Assessment:                                                     Plan:  Assessment & Plan  1. Esophageal stricture.  A referral to Dr. Soto at [facility] has been initiated. Should the esophageal manometry not be conducted locally, we will facilitate the necessary service. If necessary, we will proceed with the esophageal manometry at [facility].    Follow-up  The patient is scheduled for a follow-up visit in 3 months.     1. Esophageal stricture  Likely esophageal stricture referral is been written to Dr. Davalos at [facility] patient per [facility] will need manometry completed.  Consultation reviewed from [facility]  -  Referral to Gastroenterology

## 2024-05-08 DIAGNOSIS — K50.019 CROHN'S DISEASE OF SMALL INTESTINE WITH COMPLICATION (HCC): ICD-10-CM

## 2024-05-08 DIAGNOSIS — K21.9 GASTROESOPHAGEAL REFLUX DISEASE WITHOUT ESOPHAGITIS: ICD-10-CM

## 2024-05-08 RX ORDER — OXYCODONE HYDROCHLORIDE 10 MG/1
10 TABLET ORAL EVERY 6 HOURS PRN
Qty: 120 TABLET | Refills: 0 | Status: SHIPPED | OUTPATIENT
Start: 2024-05-08 | End: 2024-05-08 | Stop reason: SDUPTHER

## 2024-05-08 RX ORDER — OXYCODONE HYDROCHLORIDE 10 MG/1
10 TABLET ORAL EVERY 6 HOURS PRN
Qty: 120 TABLET | Refills: 0 | Status: SHIPPED | OUTPATIENT
Start: 2024-05-08 | End: 2024-06-07

## 2024-05-08 NOTE — TELEPHONE ENCOUNTER
Received request via: Pharmacy    Was the patient seen in the last year in this department? Yes    Does the patient have an active prescription (recently filled or refills available) for medication(s) requested? No    Pharmacy Name: [facility]    Does the patient have [facility] and need 100 day supply (blood pressure, diabetes and cholesterol meds only)? Patient does not have SCP

## 2024-05-08 NOTE — TELEPHONE ENCOUNTER
Received request via: Pharmacy    Was the patient seen in the last year in this department? Yes    Does the patient have an active prescription (recently filled or refills available) for medication(s) requested? No    Pharmacy Name: [facility]    Does the patient have [facility] Plus and need 100 day supply (blood pressure, diabetes and cholesterol meds only)? Patient does not have SCP

## 2024-05-22 ENCOUNTER — APPOINTMENT (OUTPATIENT)
Dept: MEDICAL GROUP | Facility: LAB | Age: 69
End: 2024-05-22
Payer: MEDICARE

## 2024-05-28 ENCOUNTER — OFFICE VISIT (OUTPATIENT)
Dept: MEDICAL GROUP | Facility: LAB | Age: 69
End: 2024-05-28
Payer: MEDICARE

## 2024-05-28 ENCOUNTER — APPOINTMENT (OUTPATIENT)
Dept: RHEUMATOLOGY | Facility: MEDICAL CENTER | Age: 69
End: 2024-05-28
Attending: STUDENT IN AN ORGANIZED HEALTH CARE EDUCATION/TRAINING PROGRAM
Payer: MEDICARE

## 2024-05-28 VITALS
HEIGHT: 72 IN | SYSTOLIC BLOOD PRESSURE: 130 MMHG | BODY MASS INDEX: 20.59 KG/M2 | WEIGHT: 152 LBS | OXYGEN SATURATION: 94 % | DIASTOLIC BLOOD PRESSURE: 76 MMHG | TEMPERATURE: 98.7 F | RESPIRATION RATE: 16 BRPM | HEART RATE: 94 BPM

## 2024-05-28 DIAGNOSIS — T78.40XA ALLERGY, INITIAL ENCOUNTER: ICD-10-CM

## 2024-05-28 DIAGNOSIS — R13.19 ESOPHAGEAL DYSPHAGIA: ICD-10-CM

## 2024-05-28 DIAGNOSIS — Z98.890 HX OF ILEOSTOMY: ICD-10-CM

## 2024-05-28 DIAGNOSIS — K50.919 CROHN'S DISEASE WITH COMPLICATION, UNSPECIFIED GASTROINTESTINAL TRACT LOCATION (HCC): ICD-10-CM

## 2024-05-28 DIAGNOSIS — I10 ESSENTIAL HYPERTENSION: ICD-10-CM

## 2024-05-28 PROCEDURE — 3078F DIAST BP <80 MM HG: CPT | Performed by: INTERNAL MEDICINE

## 2024-05-28 PROCEDURE — 99214 OFFICE O/P EST MOD 30 MIN: CPT | Performed by: INTERNAL MEDICINE

## 2024-05-28 PROCEDURE — 3075F SYST BP GE 130 - 139MM HG: CPT | Performed by: INTERNAL MEDICINE

## 2024-05-28 RX ORDER — TRIAMCINOLONE ACETONIDE 40 MG/ML
40 INJECTION, SUSPENSION INTRA-ARTICULAR; INTRAMUSCULAR ONCE
Status: COMPLETED | OUTPATIENT
Start: 2024-05-28 | End: 2024-05-28

## 2024-05-28 RX ORDER — METOPROLOL TARTRATE 50 MG/1
50 TABLET, FILM COATED ORAL 2 TIMES DAILY
Qty: 180 TABLET | Refills: 3 | Status: SHIPPED | OUTPATIENT
Start: 2024-05-28

## 2024-05-28 RX ADMIN — TRIAMCINOLONE ACETONIDE 40 MG: 40 INJECTION, SUSPENSION INTRA-ARTICULAR; INTRAMUSCULAR at 15:45

## 2024-05-28 ASSESSMENT — FIBROSIS 4 INDEX: FIB4 SCORE: 1.81

## 2024-05-29 NOTE — PROGRESS NOTES
CC: Jana Overton is a 68 y.o. female is suffering from   Chief Complaint   Patient presents with    Nephrolithiasis     Passed kidney stone on Friday night         SUBJECTIVE:  1. Esophageal dysphagia  Patient with a history of esophageal dysphagia with frequent strictures, patient and I have discussed that she will need redilatation, and is requesting follow-up also at [facility] referral written    2. Crohn's disease with complication, unspecified gastrointestinal tract location (HCC)  History of Crohn's disease with ileostomy in place    3. Hx of ileostomy  Ileostomy with possible hernia patient's referred to Dr. Kevin mustafa    4. Essential hypertension  Essential hypertension clinically stable    5. Allergy, initial encounter  Seasonal allergies severe patient given a shot of Kenalog  - Triamcinolone Acetonide    History of Present Illness      Past social, family, history: , Goyo  Social History     Tobacco Use    Smoking status: Never    Smokeless tobacco: Never    Tobacco comments:     second hand smoke parents - smoked for only 1 year many years ago   Vaping Use    Vaping status: Every Day    Substances: THC   Substance Use Topics    Alcohol use: Not Currently     Alcohol/week: 0.6 oz     Types: 1 Shots of liquor per week     Comment: gin and half a lime; tonic water    Drug use: Yes     Types: Marijuana, Inhaled     Comment: medical marijuana through bong/vape         MEDICATIONS:    Current Outpatient Medications:     metoprolol tartrate (LOPRESSOR) 50 MG Tab, Take 1 Tablet by mouth 2 times a day., Disp: 180 Tablet, Rfl: 3    oxyCODONE immediate release (ROXICODONE) 10 MG immediate release tablet, Take 1 Tablet by mouth every 6 hours as needed for Moderate Pain for up to 30 days., Disp: 120 Tablet, Rfl: 0    spironolactone (ALDACTONE) 25 MG Tab, Take 1 Tablet by mouth every day., Disp: 90 Tablet, Rfl: 0    cephALEXin (KEFLEX) 500 MG Cap, Take 1 Capsule by mouth 4 times a  day., Disp: 28 Capsule, Rfl: 0    potassium chloride SA (KDUR) 20 MEQ Tab CR, TAKE 1 TABLET BY MOUTH TWICE DAILY, Disp: 180 Tablet, Rfl: 1    scopolamine (TRANSDERM SCOP, 1.5 MG,) 1 mg/72hr PATCH 72 HR, Place 1 Patch on the skin every 72 hours., Disp: 4 Patch, Rfl: 3    furosemide (LASIX) 20 MG Tab, Take 1 Tablet by mouth every day., Disp: 100 Tablet, Rfl: 3    diazePAM (VALIUM) 5 MG Tab, Take 5 mg by mouth every 6 hours as needed (MUSCLE SPASMS)., Disp: , Rfl:     hydroxychloroquine (PLAQUENIL) 200 MG Tab, Take 1 Tablet by mouth every day., Disp: 90 Tablet, Rfl: 3    scopolamine (TRANSDERM SCOP, 1.5 MG,) 1 mg/72hr PATCH 72 HR, Place 1 Patch on the skin every 72 hours., Disp: 4 Patch, Rfl: 3    Azelastine (ASTELIN) 137 MCG/SPRAY Solution, Administer 1 Spray into each nostril at bedtime., Disp: , Rfl:     fluticasone (FLONASE) 50 MCG/ACT nasal spray, Administer 1 Spray into each nostril at bedtime., Disp: , Rfl:     nystatin (MYCOSTATIN) 218765 UNIT/ML Suspension, Take 5 mL by mouth 4 times a day., Disp: 60 mL, Rfl: 0    prochlorperazine (COMPAZINE) 10 MG Tab, Take 1 Tablet by mouth every 6 hours as needed for Nausea/Vomiting., Disp: 30 Tablet, Rfl: 3    traZODone (DESYREL) 50 MG Tab, Take 1 Tablet by mouth every evening., Disp: 90 Tablet, Rfl: 3    ondansetron (ZOFRAN ODT) 4 MG TABLET DISPERSIBLE, Take 1 Tablet by mouth every 6 hours as needed for Nausea/Vomiting., Disp: 20 Tablet, Rfl: 5    granisetron (KYTRIL) 1 MG Tab, Take 1 Tablet by mouth every 12 hours as needed for Nausea/Vomiting., Disp: 10 Tablet, Rfl: 0    levalbuterol (XOPENEX HFA) 45 MCG/ACT inhaler, Inhale 2 Puffs every four hours as needed for Shortness of Breath (As needed for shortness of breath, cough, wheezing.)., Disp: 1 Each, Rfl: 11    Multiple Vitamins-Minerals (CENTRUM SILVER 50+WOMEN) Tab, Take 1 Tablet by mouth 2 times a day. GUMMIES., Disp: , Rfl:     ZINC PICOLINATE PO, Take 1 Tablet by mouth 2 times a day., Disp: , Rfl:     Ascorbic Acid  (VITAMIN C PO), Take 1 Tablet by mouth 2 times a day., Disp: , Rfl:     Biotin w/ Vitamins C & E (HAIR SKIN & NAILS GUMMIES PO), Take 1 Tablet by mouth 2 times a day., Disp: , Rfl:     naratriptan (AMERGE) 2.5 MG tablet, Take 1 Tablet by mouth as needed for Migraine., Disp: 5 Tablet, Rfl: 3    PROBLEMS:  Patient Active Problem List    Diagnosis Date Noted    Thrombocytopenia ([facility]) 12/11/2023    Chronic diastolic congestive heart failure ([facility]) 12/11/2023    Chronic kidney disease, stage 3b 11/22/2023    Transient alteration of awareness 10/26/2023    Achalasia 10/18/2023    Esophageal candidiasis ([facility]) 02/08/2023    Sacroiliac joint pain 11/10/2022    Esophageal stricture 10/22/2022    Dysphagia 10/21/2022    Drug-induced lupus erythematosus 09/27/2022    Long-term use of hydroxychloroquine 09/27/2022    Fibromyalgia syndrome 09/27/2022    Positive cardiolipin antibodies (IgA and IgG anti-CL) 07/27/2022    Positive VAHID (1:640 homogenous pattern with negative reflex) 07/27/2022    Inflammatory arthritis 07/27/2022    Mild tetrahydrocannabinol (THC) abuse 06/02/2022    Oropharyngeal dysphagia 06/02/2022    Acute pancreatitis 05/23/2022    Migraine 06/04/2019    Essential hypertension 05/20/2019    SIRS (systemic inflammatory response syndrome) ([facility]) 05/19/2019    History of MRSA infection 12/29/2018    History of infection with vancomycin resistant Enterococcus (VRE) 12/29/2018    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[facility]) 12/28/2018    Anemia 12/15/2018    Hyponatremia 12/12/2018    Leukocytosis 12/12/2018    Anxiety disorder due to multiple medical problems 12/01/2017    DDD (degenerative disc disease), lumbar 04/12/2017    History of DVT (deep vein thrombosis) 11/23/2016    Atrial fibrillation ([facility]) 03/26/2015    Sleep apnea 10/26/2014    Postherpetic neuralgia 06/24/2014    Multiple falls 06/24/2014    COPD (chronic obstructive pulmonary disease) ([facility]) 06/24/2014    Rosacea 06/24/2014    Ileostomy in place ([facility]) 06/26/2013    GERD  (gastroesophageal reflux disease) 12/05/2012    Status post lumbar surgery 07/16/2012    Crohn's disease of small intestine with complication (HCC) 09/23/2009    Central sensitization to pain 09/23/2009    Opioid type dependence, continuous (CMS-HCC) 09/23/2009    Degenerative joint disease of cervical and lumbar spine 09/23/2009       REVIEW OF SYSTEMS:  Gen.:  No Nausea, Vomiting, fever, Chills.  Heart: No chest pain.  Lungs:  No shortness of Breath.  Psychological: Rg unusual Anxiety depression     PHYSICAL EXAM   Physical Exam       Constitutional: Alert, cooperative, not in acute distress.  Cardiovascular:  Rate Rhythm is regular without murmurs rubs clicks.     Thorax & Lungs: Clear to auscultation, no wheezing, rhonchi, or rales  HENT: Normocephalic, Atraumatic.  Eyes: PERRLA, EOMI, Conjunctiva normal.   Neck: Trachia is midline no swelling of the thyroid.   Lymphatic: No lymphadenopathy noted.   Neurologic: Alert & oriented x 3, cranial nerves II through XII are intact, Normal motor function, Normal sensory function, No focal deficits noted.   Psychiatric: Affect normal, Judgment normal, Mood normal.     VITAL SIGNS:/76 (BP Location: Left arm, Patient Position: Sitting, BP Cuff Size: Adult)   Pulse 94   Temp 37.1 °C (98.7 °F)   Resp 16   Ht 1.829 m (6')   Wt 68.9 kg (152 lb)   SpO2 94%   BMI 20.61 kg/m²     Labs: Reviewed    Assessment:                                                     Plan:  Assessment & Plan       1. Esophageal dysphagia  Referral to gastroenterology written  - Referral to Gastroenterology  - Referral to Gastroenterology  - Referral to General Surgery    2. Crohn's disease with complication, unspecified gastrointestinal tract location (HCC)  Referral to general surgery written  - Referral to Gastroenterology  - Referral to Gastroenterology  - Referral to General Surgery    3. Hx of ileostomy  Referral written to general surgery  - Referral to Gastroenterology  - Referral  to Gastroenterology  - Referral to General Surgery    4. Essential hypertension  Continue Lopressor  - metoprolol tartrate (LOPRESSOR) 50 MG Tab; Take 1 Tablet by mouth 2 times a day.  Dispense: 180 Tablet; Refill: 3    5. Allergy, initial encounter  Kenalog shot given  - triamcinolone acetonide (Kenalog-40) injection 40 mg

## 2024-06-03 DIAGNOSIS — T50.905A DRUG-INDUCED LUPUS ERYTHEMATOSUS: ICD-10-CM

## 2024-06-03 DIAGNOSIS — L93.2 DRUG-INDUCED LUPUS ERYTHEMATOSUS: ICD-10-CM

## 2024-06-03 NOTE — TELEPHONE ENCOUNTER
Received request via: Patient    Was the patient seen in the last year in this department? Yes    Does the patient have an active prescription (recently filled or refills available) for medication(s) requested? No    Pharmacy Name: [facility]    Does the patient have FPC Plus and need 100 day supply (blood pressure, diabetes and cholesterol meds only)? Medication is not for cholesterol, blood pressure or diabetes and Patient does not have SCP

## 2024-06-07 RX ORDER — HYDROXYCHLOROQUINE SULFATE 200 MG/1
200 TABLET, FILM COATED ORAL DAILY
Qty: 90 TABLET | Refills: 3 | OUTPATIENT
Start: 2024-06-07

## 2024-06-10 ENCOUNTER — APPOINTMENT (RX ONLY)
Dept: URBAN - METROPOLITAN AREA CLINIC 20 | Facility: CLINIC | Age: 69
Setting detail: DERMATOLOGY
End: 2024-06-10

## 2024-06-10 DIAGNOSIS — L71.8 OTHER ROSACEA: ICD-10-CM

## 2024-06-10 DIAGNOSIS — L57.0 ACTINIC KERATOSIS: ICD-10-CM

## 2024-06-10 DIAGNOSIS — Z71.89 OTHER SPECIFIED COUNSELING: ICD-10-CM

## 2024-06-10 DIAGNOSIS — D485 NEOPLASM OF UNCERTAIN BEHAVIOR OF SKIN: ICD-10-CM

## 2024-06-10 PROBLEM — D48.5 NEOPLASM OF UNCERTAIN BEHAVIOR OF SKIN: Status: ACTIVE | Noted: 2024-06-10

## 2024-06-10 PROCEDURE — 17003 DESTRUCT PREMALG LES 2-14: CPT

## 2024-06-10 PROCEDURE — 17000 DESTRUCT PREMALG LESION: CPT | Mod: 59

## 2024-06-10 PROCEDURE — ? COUNSELING

## 2024-06-10 PROCEDURE — ? BIOPSY BY SHAVE METHOD

## 2024-06-10 PROCEDURE — ? ADDITIONAL NOTES

## 2024-06-10 PROCEDURE — 11102 TANGNTL BX SKIN SINGLE LES: CPT

## 2024-06-10 PROCEDURE — 99213 OFFICE O/P EST LOW 20 MIN: CPT | Mod: 25

## 2024-06-10 PROCEDURE — ? LIQUID NITROGEN

## 2024-06-10 ASSESSMENT — LOCATION SIMPLE DESCRIPTION DERM
LOCATION SIMPLE: RIGHT CHEEK
LOCATION SIMPLE: LEFT NOSE
LOCATION SIMPLE: LEFT NOSE
LOCATION SIMPLE: LEFT CHEEK
LOCATION SIMPLE: NOSE

## 2024-06-10 ASSESSMENT — LOCATION DETAILED DESCRIPTION DERM
LOCATION DETAILED: LEFT NASAL ALA
LOCATION DETAILED: RIGHT INFERIOR CENTRAL MALAR CHEEK
LOCATION DETAILED: LEFT NASAL ALA
LOCATION DETAILED: NASAL SUPRATIP
LOCATION DETAILED: LEFT INFERIOR CENTRAL MALAR CHEEK
LOCATION DETAILED: LEFT NASAL SIDEWALL

## 2024-06-10 ASSESSMENT — LOCATION ZONE DERM
LOCATION ZONE: NOSE
LOCATION ZONE: FACE
LOCATION ZONE: NOSE

## 2024-06-10 NOTE — PROCEDURE: ADDITIONAL NOTES
Render Risk Assessment In Note?: no
Additional Notes: Previous HTAK [id number]
Detail Level: Detailed
Additional Notes: Well controlled with azelaic acid, no refills needed today

## 2024-06-10 NOTE — PROCEDURE: LIQUID NITROGEN
Render Post-Care Instructions In Note?: yes
Consent: The patient's consent was obtained including but not limited to risks of crusting, scabbing, blistering, scarring, darker or lighter pigmentary change, recurrence, incomplete removal and infection. RTC in 2 months if lesion(s) persistent.
Number Of Freeze-Thaw Cycles: 2 freeze-thaw cycles
Detail Level: Detailed
Post-Care Instructions: I reviewed with the patient in detail post-care instructions. Patient is to wear sunprotection, and avoid picking at any of the treated lesions. Pt may apply Vaseline to crusted or scabbing areas.
Duration Of Freeze Thaw-Cycle (Seconds): 10
Render Note In Bullet Format When Appropriate: No

## 2024-06-10 NOTE — PROCEDURE: BIOPSY BY SHAVE METHOD
Detail Level: Detailed
Depth Of Biopsy: dermis
Was A Bandage Applied: Yes
Size Of Lesion In Cm: 0
Biopsy Type: H and E
Biopsy Method: Personna blade
Anesthesia Type: 1% lidocaine with 1:100,000 epinephrine and a 1:12 solution of 8.4% sodium bicarbonate
Anesthesia Volume In Cc: 1
Hemostasis: Drysol and Electrocautery
Wound Care: Bacitracin
Dressing: Band-Aid
Destruction After The Procedure: No
Type Of Destruction Used: Curettage
Lab: [id number]
Lab Facility: 
Path Notes (To The Dermatopathologist): Per path report-HYPERTROPHIC ACTINIC KERATOSIS, WITH EXTENSION ALONG FOLLICULAR EPITHELIUM\\n(SEE COMMENT)\\nCOMMENT: This lesion extends to the deep biopsy margin and an underlying invasivecarcinoma cannot be entirely excluded. Re-biopsy is warranted if a lesion persists at this site.
Consent: Written consent was obtained and risks were reviewed including but not limited to scarring, infection, bleeding, scabbing, incomplete removal, nerve damage and allergy to anesthesia.
Post-Care Instructions: I reviewed with the patient in detail post-care instructions. Patient is to keep the biopsy site clean once daily and then apply petroleum and bandaid  until healed.
Notification Instructions: Patient will be notified of biopsy results. However, patient instructed to call the office if not contacted within 2 weeks.
Billing Type: Third-Party Bill
Information: Selecting Yes will display possible errors in your note based on the variables you have selected. This validation is only offered as a suggestion for you. PLEASE NOTE THAT THE VALIDATION TEXT WILL BE REMOVED WHEN YOU FINALIZE YOUR NOTE. IF YOU WANT TO FAX A PRELIMINARY NOTE YOU WILL NEED TO TOGGLE THIS TO 'NO' IF YOU DO NOT WANT IT IN YOUR FAXED NOTE.

## 2024-06-10 NOTE — HPI: SKIN LESION
Is This A New Presentation, Or A Follow-Up?: Skin Lesion
What Type Of Note Output Would You Prefer (Optional)?: Standard Output
How Severe Is Your Skin Lesion?: moderate
Has Your Skin Lesion Been Treated?: been treated
Additional History: Per path report-HYPERTROPHIC ACTINIC KERATOSIS, WITH EXTENSION ALONG FOLLICULAR EPITHELIUM\\n(SEE COMMENT)\\nCOMMENT: This lesion extends to the deep biopsy margin and an underlying invasivecarcinoma cannot be entirely excluded. Re-biopsy is warranted if a lesion persists at this site.
5x sit to stand = 20 secs ; 2 minute walk test = 300 feet

## 2024-06-11 ENCOUNTER — OFFICE VISIT (OUTPATIENT)
Dept: MEDICAL GROUP | Facility: LAB | Age: 69
End: 2024-06-11
Payer: MEDICARE

## 2024-06-11 VITALS
BODY MASS INDEX: 19.91 KG/M2 | OXYGEN SATURATION: 97 % | HEART RATE: 87 BPM | RESPIRATION RATE: 18 BRPM | SYSTOLIC BLOOD PRESSURE: 136 MMHG | WEIGHT: 147 LBS | DIASTOLIC BLOOD PRESSURE: 82 MMHG | TEMPERATURE: 98.1 F | HEIGHT: 72 IN

## 2024-06-11 DIAGNOSIS — R11.14 BILIOUS VOMITING WITH NAUSEA: ICD-10-CM

## 2024-06-11 DIAGNOSIS — K50.019 CROHN'S DISEASE OF SMALL INTESTINE WITH COMPLICATION (HCC): ICD-10-CM

## 2024-06-11 PROCEDURE — 99213 OFFICE O/P EST LOW 20 MIN: CPT | Performed by: INTERNAL MEDICINE

## 2024-06-11 PROCEDURE — 3079F DIAST BP 80-89 MM HG: CPT | Performed by: INTERNAL MEDICINE

## 2024-06-11 PROCEDURE — 3075F SYST BP GE 130 - 139MM HG: CPT | Performed by: INTERNAL MEDICINE

## 2024-06-11 RX ORDER — TRIAMCINOLONE ACETONIDE 40 MG/ML
40 INJECTION, SUSPENSION INTRA-ARTICULAR; INTRAMUSCULAR ONCE
Status: COMPLETED | OUTPATIENT
Start: 2024-06-11 | End: 2024-06-11

## 2024-06-11 RX ADMIN — TRIAMCINOLONE ACETONIDE 40 MG: 40 INJECTION, SUSPENSION INTRA-ARTICULAR; INTRAMUSCULAR at 10:35

## 2024-06-11 ASSESSMENT — FIBROSIS 4 INDEX: FIB4 SCORE: 1.81

## 2024-06-12 ENCOUNTER — HOSPITAL ENCOUNTER (OUTPATIENT)
Dept: LAB | Facility: MEDICAL CENTER | Age: 69
End: 2024-06-12
Attending: INTERNAL MEDICINE
Payer: MEDICARE

## 2024-06-12 ENCOUNTER — HOSPITAL ENCOUNTER (OUTPATIENT)
Dept: LAB | Facility: MEDICAL CENTER | Age: 69
End: 2024-06-12
Attending: STUDENT IN AN ORGANIZED HEALTH CARE EDUCATION/TRAINING PROGRAM
Payer: MEDICARE

## 2024-06-12 DIAGNOSIS — K50.019 CROHN'S DISEASE OF SMALL INTESTINE WITH COMPLICATION (HCC): ICD-10-CM

## 2024-06-12 DIAGNOSIS — L93.2 DRUG-INDUCED LUPUS ERYTHEMATOSUS: ICD-10-CM

## 2024-06-12 DIAGNOSIS — T50.905A DRUG-INDUCED LUPUS ERYTHEMATOSUS: ICD-10-CM

## 2024-06-12 RX ORDER — SCOLOPAMINE TRANSDERMAL SYSTEM 1 MG/1
1 PATCH, EXTENDED RELEASE TRANSDERMAL
Qty: 4 PATCH | Refills: 3 | Status: SHIPPED | OUTPATIENT
Start: 2024-06-12

## 2024-06-12 NOTE — PROGRESS NOTES
CC: Jana Overton is a 68 y.o. female is suffering from   Chief Complaint   Patient presents with    Follow-Up         SUBJECTIVE:  1. Crohn's disease of small intestine with complication (HCC)  Jana is here for follow-up states that she is having significant problems associated with Crohn's disease of the small intestine, had 1 episode of emesis in the office    History of Present Illness      Past social, family, history: , Goyo  Social History     Tobacco Use    Smoking status: Never    Smokeless tobacco: Never    Tobacco comments:     second hand smoke parents - smoked for only 1 year many years ago   Vaping Use    Vaping status: Every Day    Substances: THC   Substance Use Topics    Alcohol use: Not Currently     Alcohol/week: 0.6 oz     Types: 1 Shots of liquor per week     Comment: gin and half a lime; tonic water    Drug use: Yes     Types: Marijuana, Inhaled     Comment: medical marijuana through bong/vape         MEDICATIONS:    Current Outpatient Medications:     metoprolol tartrate (LOPRESSOR) 50 MG Tab, Take 1 Tablet by mouth 2 times a day., Disp: 180 Tablet, Rfl: 3    spironolactone (ALDACTONE) 25 MG Tab, Take 1 Tablet by mouth every day., Disp: 90 Tablet, Rfl: 0    cephALEXin (KEFLEX) 500 MG Cap, Take 1 Capsule by mouth 4 times a day., Disp: 28 Capsule, Rfl: 0    potassium chloride SA (KDUR) 20 MEQ Tab CR, TAKE 1 TABLET BY MOUTH TWICE DAILY, Disp: 180 Tablet, Rfl: 1    scopolamine (TRANSDERM SCOP, 1.5 MG,) 1 mg/72hr PATCH 72 HR, Place 1 Patch on the skin every 72 hours., Disp: 4 Patch, Rfl: 3    furosemide (LASIX) 20 MG Tab, Take 1 Tablet by mouth every day., Disp: 100 Tablet, Rfl: 3    diazePAM (VALIUM) 5 MG Tab, Take 5 mg by mouth every 6 hours as needed (MUSCLE SPASMS)., Disp: , Rfl:     hydroxychloroquine (PLAQUENIL) 200 MG Tab, Take 1 Tablet by mouth every day., Disp: 90 Tablet, Rfl: 3    scopolamine (TRANSDERM SCOP, 1.5 MG,) 1 mg/72hr PATCH 72 HR, Place 1 Patch on the skin every 72  hours., Disp: 4 Patch, Rfl: 3    Azelastine (ASTELIN) 137 MCG/SPRAY Solution, Administer 1 Spray into each nostril at bedtime., Disp: , Rfl:     fluticasone (FLONASE) 50 MCG/ACT nasal spray, Administer 1 Spray into each nostril at bedtime., Disp: , Rfl:     nystatin (MYCOSTATIN) 613487 UNIT/ML Suspension, Take 5 mL by mouth 4 times a day., Disp: 60 mL, Rfl: 0    prochlorperazine (COMPAZINE) 10 MG Tab, Take 1 Tablet by mouth every 6 hours as needed for Nausea/Vomiting., Disp: 30 Tablet, Rfl: 3    traZODone (DESYREL) 50 MG Tab, Take 1 Tablet by mouth every evening., Disp: 90 Tablet, Rfl: 3    ondansetron (ZOFRAN ODT) 4 MG TABLET DISPERSIBLE, Take 1 Tablet by mouth every 6 hours as needed for Nausea/Vomiting., Disp: 20 Tablet, Rfl: 5    granisetron (KYTRIL) 1 MG Tab, Take 1 Tablet by mouth every 12 hours as needed for Nausea/Vomiting., Disp: 10 Tablet, Rfl: 0    levalbuterol (XOPENEX HFA) 45 MCG/ACT inhaler, Inhale 2 Puffs every four hours as needed for Shortness of Breath (As needed for shortness of breath, cough, wheezing.)., Disp: 1 Each, Rfl: 11    Multiple Vitamins-Minerals (CENTRUM SILVER 50+WOMEN) Tab, Take 1 Tablet by mouth 2 times a day. GUMMIES., Disp: , Rfl:     ZINC PICOLINATE PO, Take 1 Tablet by mouth 2 times a day., Disp: , Rfl:     Ascorbic Acid (VITAMIN C PO), Take 1 Tablet by mouth 2 times a day., Disp: , Rfl:     Biotin w/ Vitamins C & E (HAIR SKIN & NAILS GUMMIES PO), Take 1 Tablet by mouth 2 times a day., Disp: , Rfl:     naratriptan (AMERGE) 2.5 MG tablet, Take 1 Tablet by mouth as needed for Migraine., Disp: 5 Tablet, Rfl: 3    PROBLEMS:  Patient Active Problem List    Diagnosis Date Noted    Thrombocytopenia (HCC) 12/11/2023    Chronic diastolic congestive heart failure (HCC) 12/11/2023    Chronic kidney disease, stage 3b 11/22/2023    Transient alteration of awareness 10/26/2023    Achalasia 10/18/2023    Esophageal candidiasis (HCC) 02/08/2023    Sacroiliac joint pain 11/10/2022    Esophageal  stricture 10/22/2022    Dysphagia 10/21/2022    Drug-induced lupus erythematosus 09/27/2022    Long-term use of hydroxychloroquine 09/27/2022    Fibromyalgia syndrome 09/27/2022    Positive cardiolipin antibodies (IgA and IgG anti-CL) 07/27/2022    Positive VAHID (1:640 homogenous pattern with negative reflex) 07/27/2022    Inflammatory arthritis 07/27/2022    Mild tetrahydrocannabinol (THC) abuse 06/02/2022    Oropharyngeal dysphagia 06/02/2022    Acute pancreatitis 05/23/2022    Migraine 06/04/2019    Essential hypertension 05/20/2019    SIRS (systemic inflammatory response syndrome) ([facility]) 05/19/2019    History of MRSA infection 12/29/2018    History of infection with vancomycin resistant Enterococcus (VRE) 12/29/2018    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[facility]) 12/28/2018    Anemia 12/15/2018    Hyponatremia 12/12/2018    Leukocytosis 12/12/2018    Anxiety disorder due to multiple medical problems 12/01/2017    DDD (degenerative disc disease), lumbar 04/12/2017    History of DVT (deep vein thrombosis) 11/23/2016    Atrial fibrillation ([facility]) 03/26/2015    Sleep apnea 10/26/2014    Postherpetic neuralgia 06/24/2014    Multiple falls 06/24/2014    COPD (chronic obstructive pulmonary disease) ([facility]) 06/24/2014    Rosacea 06/24/2014    Ileostomy in place ([facility]) 06/26/2013    GERD (gastroesophageal reflux disease) 12/05/2012    Status post lumbar surgery 07/16/2012    Crohn's disease of small intestine with complication ([facility]) 09/23/2009    Central sensitization to pain 09/23/2009    Opioid type dependence, continuous (CMS-[facility]) 09/23/2009    Degenerative joint disease of cervical and lumbar spine 09/23/2009       REVIEW OF SYSTEMS:  Gen.:  No Nausea, Vomiting, fever, Chills.  Heart: No chest pain.  Lungs:  No shortness of Breath.  Psychological: Rg unusual Anxiety depression     PHYSICAL EXAM   Physical Exam       Constitutional: Alert, cooperative, not in acute distress.  Cardiovascular:  Rate Rhythm is regular without murmurs rubs  clicks.     Thorax & Lungs: Clear to auscultation, no wheezing, rhonchi, or rales  HENT: Normocephalic, Atraumatic.  Eyes: PERRLA, EOMI, Conjunctiva normal.   Neck: Trachia is midline no swelling of the thyroid.   Lymphatic: No lymphadenopathy noted.   Neurologic: Alert & oriented x 3, cranial nerves II through XII are intact, Normal motor function, Normal sensory function, No focal deficits noted.   Psychiatric: Affect normal, Judgment normal, Mood normal.     VITAL SIGNS:/82 (BP Location: Left arm, Patient Position: Sitting, BP Cuff Size: Adult)   Pulse 87   Temp 36.7 °C (98.1 °F)   Resp 18   Ht 1.829 m (6')   Wt 66.7 kg (147 lb)   SpO2 97%   BMI 19.94 kg/m²     Labs: Reviewed    Assessment:                                                     Plan:  Assessment & Plan       1. Crohn's disease of small intestine with complication (HCC)  Patient with Crohn's disease, Kenalog injection given in the office  - Comp Metabolic Panel; Future  - CBC WITH DIFFERENTIAL; Future

## 2024-06-18 ENCOUNTER — TELEPHONE (OUTPATIENT)
Dept: MEDICAL GROUP | Facility: LAB | Age: 69
End: 2024-06-18
Payer: MEDICARE

## 2024-06-18 NOTE — TELEPHONE ENCOUNTER
Jana's  Goyo called to let you know that she is currently at [facility].  Jana was having problems with her throat, unable to swallow her medication pills.  Goyo was concerned that [facility] wants to discharge her soon, and he believes Jana would benefit with a few extra days at [facility].

## 2024-06-19 ENCOUNTER — TELEPHONE (OUTPATIENT)
Dept: MEDICAL GROUP | Facility: LAB | Age: 69
End: 2024-06-19
Payer: MEDICARE

## 2024-06-19 NOTE — TELEPHONE ENCOUNTER
Caller Name: Goyo  Call Back Number: [phone]    How would the patient prefer to be contacted with a response: Phone call OK to leave a detailed message    Pt's  called the hospitalist  took her off all her meds and is keeping her for 48-72 hours.  Goyo wouldlike a call back.

## 2024-06-20 NOTE — TELEPHONE ENCOUNTER
Telephone call returned to Goyo, discussed that Jana is being held on a 48-hour hold because of concerns regarding her psychological state, Goyo is looking at removing her from the hospital encouraged him to try and have her discharge to avoid possibly getting stuck with a hospital bill.

## 2024-06-21 ENCOUNTER — OFFICE VISIT (OUTPATIENT)
Dept: MEDICAL GROUP | Facility: LAB | Age: 69
End: 2024-06-21
Payer: MEDICARE

## 2024-06-21 ENCOUNTER — HOSPITAL ENCOUNTER (OUTPATIENT)
Facility: MEDICAL CENTER | Age: 69
End: 2024-06-21
Attending: INTERNAL MEDICINE
Payer: MEDICARE

## 2024-06-21 ENCOUNTER — TELEPHONE (OUTPATIENT)
Dept: MEDICAL GROUP | Facility: LAB | Age: 69
End: 2024-06-21

## 2024-06-21 DIAGNOSIS — G89.4 CHRONIC PAIN SYNDROME: ICD-10-CM

## 2024-06-21 DIAGNOSIS — K52.9 COLITIS: ICD-10-CM

## 2024-06-21 DIAGNOSIS — R60.9 EDEMA, UNSPECIFIED TYPE: ICD-10-CM

## 2024-06-21 DIAGNOSIS — M47.818 SI JOINT ARTHRITIS: ICD-10-CM

## 2024-06-21 DIAGNOSIS — K22.0 ACHALASIA: ICD-10-CM

## 2024-06-21 DIAGNOSIS — Z93.2 ILEOSTOMY IN PLACE (HCC): ICD-10-CM

## 2024-06-21 DIAGNOSIS — K50.019 CROHN'S DISEASE OF SMALL INTESTINE WITH COMPLICATION (HCC): ICD-10-CM

## 2024-06-21 DIAGNOSIS — K21.9 GASTROESOPHAGEAL REFLUX DISEASE WITHOUT ESOPHAGITIS: ICD-10-CM

## 2024-06-21 DIAGNOSIS — K22.2 ESOPHAGEAL STRICTURE: ICD-10-CM

## 2024-06-21 LAB
ALBUMIN SERPL BCP-MCNC: 4.5 G/DL (ref 3.2–4.9)
ALBUMIN/GLOB SERPL: 1.4 G/DL
ALP SERPL-CCNC: 102 U/L (ref 30–99)
ALT SERPL-CCNC: 34 U/L (ref 2–50)
ANION GAP SERPL CALC-SCNC: 15 MMOL/L (ref 7–16)
AST SERPL-CCNC: 34 U/L (ref 12–45)
BASOPHILS # BLD AUTO: 0.5 % (ref 0–1.8)
BASOPHILS # BLD: 0.04 K/UL (ref 0–0.12)
BILIRUB SERPL-MCNC: 0.5 MG/DL (ref 0.1–1.5)
BUN SERPL-MCNC: 25 MG/DL (ref 8–22)
CALCIUM ALBUM COR SERPL-MCNC: 10.4 MG/DL (ref 8.5–10.5)
CALCIUM SERPL-MCNC: 10.8 MG/DL (ref 8.5–10.5)
CHLORIDE SERPL-SCNC: 100 MMOL/L (ref 96–112)
CO2 SERPL-SCNC: 25 MMOL/L (ref 20–33)
CREAT SERPL-MCNC: 1.38 MG/DL (ref 0.5–1.4)
EOSINOPHIL # BLD AUTO: 0.08 K/UL (ref 0–0.51)
EOSINOPHIL NFR BLD: 1 % (ref 0–6.9)
ERYTHROCYTE [DISTWIDTH] IN BLOOD BY AUTOMATED COUNT: 46.4 FL (ref 35.9–50)
GFR SERPLBLD CREATININE-BSD FMLA CKD-EPI: 41 ML/MIN/1.73 M 2
GLOBULIN SER CALC-MCNC: 3.3 G/DL (ref 1.9–3.5)
GLUCOSE SERPL-MCNC: 95 MG/DL (ref 65–99)
HCT VFR BLD AUTO: 42.1 % (ref 37–47)
HGB BLD-MCNC: 13.6 G/DL (ref 12–16)
IMM GRANULOCYTES # BLD AUTO: 0.02 K/UL (ref 0–0.11)
IMM GRANULOCYTES NFR BLD AUTO: 0.3 % (ref 0–0.9)
LYMPHOCYTES # BLD AUTO: 1.49 K/UL (ref 1–4.8)
LYMPHOCYTES NFR BLD: 19.2 % (ref 22–41)
MCH RBC QN AUTO: 31.1 PG (ref 27–33)
MCHC RBC AUTO-ENTMCNC: 32.3 G/DL (ref 32.2–35.5)
MCV RBC AUTO: 96.1 FL (ref 81.4–97.8)
MONOCYTES # BLD AUTO: 0.77 K/UL (ref 0–0.85)
MONOCYTES NFR BLD AUTO: 9.9 % (ref 0–13.4)
NEUTROPHILS # BLD AUTO: 5.36 K/UL (ref 1.82–7.42)
NEUTROPHILS NFR BLD: 69.1 % (ref 44–72)
NRBC # BLD AUTO: 0 K/UL
NRBC BLD-RTO: 0 /100 WBC (ref 0–0.2)
NT-PROBNP SERPL IA-MCNC: 398 PG/ML (ref 0–125)
PLATELET # BLD AUTO: 209 K/UL (ref 164–446)
PMV BLD AUTO: 11.2 FL (ref 9–12.9)
POTASSIUM SERPL-SCNC: 3.8 MMOL/L (ref 3.6–5.5)
PROT SERPL-MCNC: 7.8 G/DL (ref 6–8.2)
RBC # BLD AUTO: 4.38 M/UL (ref 4.2–5.4)
SODIUM SERPL-SCNC: 140 MMOL/L (ref 135–145)
WBC # BLD AUTO: 7.8 K/UL (ref 4.8–10.8)

## 2024-06-21 PROCEDURE — 85025 COMPLETE CBC W/AUTO DIFF WBC: CPT

## 2024-06-21 PROCEDURE — 83880 ASSAY OF NATRIURETIC PEPTIDE: CPT | Mod: GA

## 2024-06-21 PROCEDURE — 80053 COMPREHEN METABOLIC PANEL: CPT

## 2024-06-21 PROCEDURE — 99214 OFFICE O/P EST MOD 30 MIN: CPT | Performed by: INTERNAL MEDICINE

## 2024-06-21 PROCEDURE — 85652 RBC SED RATE AUTOMATED: CPT

## 2024-06-21 RX ORDER — OXYCODONE HYDROCHLORIDE 100 MG/5ML
5 SOLUTION ORAL EVERY 4 HOURS PRN
Qty: 28 ML | Refills: 0 | Status: SHIPPED | OUTPATIENT
Start: 2024-06-21 | End: 2024-07-21

## 2024-06-22 LAB — ERYTHROCYTE [SEDIMENTATION RATE] IN BLOOD BY WESTERGREN METHOD: 24 MM/HOUR (ref 0–25)

## 2024-06-22 NOTE — PROGRESS NOTES
CC: Jana Overton is a 68 y.o. female is suffering from No chief complaint on file.        SUBJECTIVE:  1. Crohn's disease of small intestine with complication (HCC)  Jana is here for follow-up continues to struggle with her Crohn's disease, is concerned about being active    2. Achalasia  Patient with a history of achalasia apparently dilated while at [facility] approximately 2 days prior    3. Edema, unspecified type  History of lower extremity edema etiology behind it is uncertain    History of Present Illness      Past social, family, history: , Goyo  Social History     Tobacco Use    Smoking status: Never    Smokeless tobacco: Never    Tobacco comments:     second hand smoke parents - smoked for only 1 year many years ago   Vaping Use    Vaping status: Every Day    Substances: THC   Substance Use Topics    Alcohol use: Not Currently     Alcohol/week: 0.6 oz     Types: 1 Shots of liquor per week     Comment: gin and half a lime; tonic water    Drug use: Yes     Types: Marijuana, Inhaled     Comment: medical marijuana through bong/vape         MEDICATIONS:    Current Outpatient Medications:     oxyCODONE HCl 10 MG/0.5ML Conc, Take 0.25 mL by mouth every four hours as needed (pain) for up to 30 days., Disp: 28 mL, Rfl: 0    furosemide (LASIX), Infuse 33.35 mL into a venous catheter one time for 1 dose., Disp: 33.35 mL, Rfl: 0    scopolamine (TRANSDERM SCOP, 1.5 MG,) 1 mg/72hr PATCH 72 HR, Place 1 Patch on the skin every 72 hours., Disp: 4 Patch, Rfl: 3    metoprolol tartrate (LOPRESSOR) 50 MG Tab, Take 1 Tablet by mouth 2 times a day., Disp: 180 Tablet, Rfl: 3    spironolactone (ALDACTONE) 25 MG Tab, Take 1 Tablet by mouth every day., Disp: 90 Tablet, Rfl: 0    cephALEXin (KEFLEX) 500 MG Cap, Take 1 Capsule by mouth 4 times a day., Disp: 28 Capsule, Rfl: 0    potassium chloride SA (KDUR) 20 MEQ Tab CR, TAKE 1 TABLET BY MOUTH TWICE DAILY, Disp: 180 Tablet, Rfl: 1    furosemide (LASIX)  20 MG Tab, Take 1 Tablet by mouth every day., Disp: 100 Tablet, Rfl: 3    diazePAM (VALIUM) 5 MG Tab, Take 5 mg by mouth every 6 hours as needed (MUSCLE SPASMS)., Disp: , Rfl:     hydroxychloroquine (PLAQUENIL) 200 MG Tab, Take 1 Tablet by mouth every day., Disp: 90 Tablet, Rfl: 3    Azelastine (ASTELIN) 137 MCG/SPRAY Solution, Administer 1 Spray into each nostril at bedtime., Disp: , Rfl:     fluticasone (FLONASE) 50 MCG/ACT nasal spray, Administer 1 Spray into each nostril at bedtime., Disp: , Rfl:     nystatin (MYCOSTATIN) 510635 UNIT/ML Suspension, Take 5 mL by mouth 4 times a day., Disp: 60 mL, Rfl: 0    prochlorperazine (COMPAZINE) 10 MG Tab, Take 1 Tablet by mouth every 6 hours as needed for Nausea/Vomiting., Disp: 30 Tablet, Rfl: 3    traZODone (DESYREL) 50 MG Tab, Take 1 Tablet by mouth every evening., Disp: 90 Tablet, Rfl: 3    ondansetron (ZOFRAN ODT) 4 MG TABLET DISPERSIBLE, Take 1 Tablet by mouth every 6 hours as needed for Nausea/Vomiting., Disp: 20 Tablet, Rfl: 5    granisetron (KYTRIL) 1 MG Tab, Take 1 Tablet by mouth every 12 hours as needed for Nausea/Vomiting., Disp: 10 Tablet, Rfl: 0    levalbuterol (XOPENEX HFA) 45 MCG/ACT inhaler, Inhale 2 Puffs every four hours as needed for Shortness of Breath (As needed for shortness of breath, cough, wheezing.)., Disp: 1 Each, Rfl: 11    Multiple Vitamins-Minerals (CENTRUM SILVER 50+WOMEN) Tab, Take 1 Tablet by mouth 2 times a day. GUMMIES., Disp: , Rfl:     ZINC PICOLINATE PO, Take 1 Tablet by mouth 2 times a day., Disp: , Rfl:     Ascorbic Acid (VITAMIN C PO), Take 1 Tablet by mouth 2 times a day., Disp: , Rfl:     Biotin w/ Vitamins C & E (HAIR SKIN & NAILS GUMMIES PO), Take 1 Tablet by mouth 2 times a day., Disp: , Rfl:     naratriptan (AMERGE) 2.5 MG tablet, Take 1 Tablet by mouth as needed for Migraine., Disp: 5 Tablet, Rfl: 3    PROBLEMS:  Patient Active Problem List    Diagnosis Date Noted    Thrombocytopenia (HCC) 12/11/2023    Chronic diastolic  congestive heart failure (HCC) 12/11/2023    Chronic kidney disease, stage 3b 11/22/2023    Transient alteration of awareness 10/26/2023    Achalasia 10/18/2023    Esophageal candidiasis ([facility]) 02/08/2023    Sacroiliac joint pain 11/10/2022    Esophageal stricture 10/22/2022    Dysphagia 10/21/2022    Drug-induced lupus erythematosus 09/27/2022    Long-term use of hydroxychloroquine 09/27/2022    Fibromyalgia syndrome 09/27/2022    Positive cardiolipin antibodies (IgA and IgG anti-CL) 07/27/2022    Positive VAHID (1:640 homogenous pattern with negative reflex) 07/27/2022    Inflammatory arthritis 07/27/2022    Mild tetrahydrocannabinol (THC) abuse 06/02/2022    Oropharyngeal dysphagia 06/02/2022    Acute pancreatitis 05/23/2022    Migraine 06/04/2019    Essential hypertension 05/20/2019    SIRS (systemic inflammatory response syndrome) ([facility]) 05/19/2019    History of MRSA infection 12/29/2018    History of infection with vancomycin resistant Enterococcus (VRE) 12/29/2018    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[facility]) 12/28/2018    Anemia 12/15/2018    Hyponatremia 12/12/2018    Leukocytosis 12/12/2018    Anxiety disorder due to multiple medical problems 12/01/2017    DDD (degenerative disc disease), lumbar 04/12/2017    History of DVT (deep vein thrombosis) 11/23/2016    Atrial fibrillation ([facility]) 03/26/2015    Sleep apnea 10/26/2014    Postherpetic neuralgia 06/24/2014    Multiple falls 06/24/2014    COPD (chronic obstructive pulmonary disease) ([facility]) 06/24/2014    Rosacea 06/24/2014    Ileostomy in place ([facility]) 06/26/2013    GERD (gastroesophageal reflux disease) 12/05/2012    Status post lumbar surgery 07/16/2012    Crohn's disease of small intestine with complication ([facility]) 09/23/2009    Central sensitization to pain 09/23/2009    Opioid type dependence, continuous ([facility]) 09/23/2009    Degenerative joint disease of cervical and lumbar spine 09/23/2009       REVIEW OF SYSTEMS:  Gen.:  No Nausea, Vomiting, fever, Chills.  Heart: No chest  pain.  Lungs:  No shortness of Breath.  Psychological: Rg unusual Anxiety depression     PHYSICAL EXAM   Physical Exam       Constitutional: Alert, cooperative, not in acute distress.  Cardiovascular:  Rate Rhythm is regular without murmurs rubs clicks.     Thorax & Lungs: Clear to auscultation, no wheezing, rhonchi, or rales  HENT: Normocephalic, Atraumatic.  Eyes: PERRLA, EOMI, Conjunctiva normal.   Neck: Trachia is midline no swelling of the thyroid.   Lymphatic: No lymphadenopathy noted.   Musculoskeletal: Trace to 1+ edema left and right lower extremities  Neurologic: Alert & oriented x 3, cranial nerves II through XII are intact, Normal motor function, Normal sensory function, No focal deficits noted.   Psychiatric: Affect normal, Judgment normal, Mood normal.     VITAL SIGNS:There were no vitals taken for this visit.    Labs: Reviewed    Assessment:                                                     Plan:  Assessment & Plan       1. Crohn's disease of small intestine with complication (HCC)  Recheck Crohn's disease check sedimentation rate also CBC comprehensive metabolic panel  - Sed Rate; Future    2. Achalasia  History of achalasia clinically stable    3. Edema, unspecified type  History of edema etiology uncertain

## 2024-06-23 ENCOUNTER — PATIENT MESSAGE (OUTPATIENT)
Dept: MEDICAL GROUP | Facility: LAB | Age: 69
End: 2024-06-23
Payer: MEDICARE

## 2024-06-24 ENCOUNTER — APPOINTMENT (OUTPATIENT)
Dept: MEDICAL GROUP | Facility: LAB | Age: 69
End: 2024-06-24
Payer: MEDICARE

## 2024-07-01 ENCOUNTER — TELEPHONE (OUTPATIENT)
Dept: MEDICAL GROUP | Facility: LAB | Age: 69
End: 2024-07-01

## 2024-07-01 ENCOUNTER — APPOINTMENT (OUTPATIENT)
Dept: MEDICAL GROUP | Facility: LAB | Age: 69
End: 2024-07-01
Payer: MEDICARE

## 2024-07-01 VITALS
SYSTOLIC BLOOD PRESSURE: 150 MMHG | OXYGEN SATURATION: 95 % | WEIGHT: 142 LBS | TEMPERATURE: 98.6 F | HEIGHT: 72 IN | DIASTOLIC BLOOD PRESSURE: 70 MMHG | BODY MASS INDEX: 19.23 KG/M2 | HEART RATE: 114 BPM | RESPIRATION RATE: 18 BRPM

## 2024-07-01 DIAGNOSIS — F11.20 OPIOID TYPE DEPENDENCE, CONTINUOUS (HCC): ICD-10-CM

## 2024-07-01 DIAGNOSIS — K50.019 CROHN'S DISEASE OF SMALL INTESTINE WITH COMPLICATION (HCC): ICD-10-CM

## 2024-07-01 DIAGNOSIS — K22.2 ESOPHAGEAL STRICTURE: ICD-10-CM

## 2024-07-01 DIAGNOSIS — N30.00 ACUTE CYSTITIS WITHOUT HEMATURIA: ICD-10-CM

## 2024-07-01 DIAGNOSIS — M47.818 SI JOINT ARTHRITIS: ICD-10-CM

## 2024-07-01 PROCEDURE — 99214 OFFICE O/P EST MOD 30 MIN: CPT | Performed by: INTERNAL MEDICINE

## 2024-07-01 PROCEDURE — 3077F SYST BP >= 140 MM HG: CPT | Performed by: INTERNAL MEDICINE

## 2024-07-01 PROCEDURE — 3078F DIAST BP <80 MM HG: CPT | Performed by: INTERNAL MEDICINE

## 2024-07-01 RX ORDER — OXYCODONE HYDROCHLORIDE AND ACETAMINOPHEN 325; 5 MG/5ML; MG/5ML
5 SOLUTION ORAL EVERY 4 HOURS PRN
Qty: 30 ML | Refills: 0 | Status: SHIPPED | OUTPATIENT
Start: 2024-07-01 | End: 2024-07-31

## 2024-07-01 RX ORDER — LIDOCAINE 4 G/G
1 PATCH TOPICAL EVERY 24 HOURS
Qty: 30 PATCH | Refills: 2 | Status: SHIPPED | OUTPATIENT
Start: 2024-07-01

## 2024-07-01 ASSESSMENT — FIBROSIS 4 INDEX: FIB4 SCORE: 1.95

## 2024-07-02 ENCOUNTER — HOSPITAL ENCOUNTER (OUTPATIENT)
Facility: MEDICAL CENTER | Age: 69
End: 2024-07-02
Attending: INTERNAL MEDICINE
Payer: MEDICARE

## 2024-07-02 ENCOUNTER — PATIENT MESSAGE (OUTPATIENT)
Dept: MEDICAL GROUP | Facility: LAB | Age: 69
End: 2024-07-02
Payer: MEDICARE

## 2024-07-02 DIAGNOSIS — N30.00 ACUTE CYSTITIS WITHOUT HEMATURIA: ICD-10-CM

## 2024-07-02 LAB
AMORPH CRY #/AREA URNS HPF: PRESENT /HPF
APPEARANCE UR: ABNORMAL
BACTERIA #/AREA URNS HPF: NEGATIVE /HPF
BILIRUB UR QL STRIP.AUTO: ABNORMAL
CAOX CRY #/AREA URNS HPF: NORMAL /HPF
COLOR UR: YELLOW
EPI CELLS #/AREA URNS HPF: NORMAL /HPF
GLUCOSE UR STRIP.AUTO-MCNC: NEGATIVE MG/DL
HYALINE CASTS #/AREA URNS LPF: NORMAL /LPF
KETONES UR STRIP.AUTO-MCNC: ABNORMAL MG/DL
LEUKOCYTE ESTERASE UR QL STRIP.AUTO: ABNORMAL
MICRO URNS: ABNORMAL
NITRITE UR QL STRIP.AUTO: NEGATIVE
PH UR STRIP.AUTO: 6 [PH] (ref 5–8)
PROT UR QL STRIP: 30 MG/DL
RBC # URNS HPF: NORMAL /HPF
RBC UR QL AUTO: NEGATIVE
SP GR UR STRIP.AUTO: >=1.03
UROBILINOGEN UR STRIP.AUTO-MCNC: 0.2 MG/DL
WBC #/AREA URNS HPF: NORMAL /HPF

## 2024-07-02 PROCEDURE — 81001 URINALYSIS AUTO W/SCOPE: CPT

## 2024-07-03 ENCOUNTER — NON-PROVIDER VISIT (OUTPATIENT)
Dept: MEDICAL GROUP | Facility: LAB | Age: 69
End: 2024-07-03
Payer: MEDICARE

## 2024-07-03 DIAGNOSIS — G89.29 CENTRAL SENSITIZATION TO PAIN: ICD-10-CM

## 2024-07-03 DIAGNOSIS — R10.84 GENERALIZED ABDOMINAL PAIN: ICD-10-CM

## 2024-07-03 RX ORDER — KETOROLAC TROMETHAMINE 30 MG/ML
30 INJECTION, SOLUTION INTRAMUSCULAR; INTRAVENOUS ONCE
Status: COMPLETED | OUTPATIENT
Start: 2024-07-03 | End: 2024-07-03

## 2024-07-03 RX ADMIN — KETOROLAC TROMETHAMINE 30 MG: 30 INJECTION, SOLUTION INTRAMUSCULAR; INTRAVENOUS at 09:36

## 2024-07-05 ENCOUNTER — OFFICE VISIT (OUTPATIENT)
Dept: MEDICAL GROUP | Facility: LAB | Age: 69
End: 2024-07-05
Payer: MEDICARE

## 2024-07-05 ENCOUNTER — HOSPITAL ENCOUNTER (OUTPATIENT)
Facility: MEDICAL CENTER | Age: 69
End: 2024-07-05
Attending: INTERNAL MEDICINE
Payer: MEDICARE

## 2024-07-05 VITALS
HEART RATE: 72 BPM | RESPIRATION RATE: 18 BRPM | WEIGHT: 139 LBS | TEMPERATURE: 98.7 F | OXYGEN SATURATION: 96 % | SYSTOLIC BLOOD PRESSURE: 132 MMHG | HEIGHT: 72 IN | DIASTOLIC BLOOD PRESSURE: 76 MMHG | BODY MASS INDEX: 18.83 KG/M2

## 2024-07-05 DIAGNOSIS — K50.019 CROHN'S DISEASE OF SMALL INTESTINE WITH COMPLICATION (HCC): ICD-10-CM

## 2024-07-05 DIAGNOSIS — N18.32 CHRONIC KIDNEY DISEASE, STAGE 3B: ICD-10-CM

## 2024-07-05 DIAGNOSIS — M25.50 ARTHRALGIA, UNSPECIFIED JOINT: ICD-10-CM

## 2024-07-05 LAB
ALBUMIN SERPL BCP-MCNC: 4.5 G/DL (ref 3.2–4.9)
ALBUMIN/GLOB SERPL: 1.5 G/DL
ALP SERPL-CCNC: 96 U/L (ref 30–99)
ALT SERPL-CCNC: 25 U/L (ref 2–50)
ANION GAP SERPL CALC-SCNC: 18 MMOL/L (ref 7–16)
AST SERPL-CCNC: 36 U/L (ref 12–45)
BILIRUB SERPL-MCNC: 1.1 MG/DL (ref 0.1–1.5)
BUN SERPL-MCNC: 25 MG/DL (ref 8–22)
CALCIUM ALBUM COR SERPL-MCNC: 10.8 MG/DL (ref 8.5–10.5)
CALCIUM SERPL-MCNC: 11.2 MG/DL (ref 8.5–10.5)
CHLORIDE SERPL-SCNC: 102 MMOL/L (ref 96–112)
CO2 SERPL-SCNC: 19 MMOL/L (ref 20–33)
CREAT SERPL-MCNC: 1.18 MG/DL (ref 0.5–1.4)
CRP SERPL HS-MCNC: 1.07 MG/DL (ref 0–0.75)
ERYTHROCYTE [SEDIMENTATION RATE] IN BLOOD BY WESTERGREN METHOD: 21 MM/HOUR (ref 0–25)
GFR SERPLBLD CREATININE-BSD FMLA CKD-EPI: 49 ML/MIN/1.73 M 2
GLOBULIN SER CALC-MCNC: 3 G/DL (ref 1.9–3.5)
GLUCOSE SERPL-MCNC: 103 MG/DL (ref 65–99)
NT-PROBNP SERPL IA-MCNC: 357 PG/ML (ref 0–125)
POTASSIUM SERPL-SCNC: 3.9 MMOL/L (ref 3.6–5.5)
PROT SERPL-MCNC: 7.5 G/DL (ref 6–8.2)
SODIUM SERPL-SCNC: 139 MMOL/L (ref 135–145)

## 2024-07-05 PROCEDURE — 85652 RBC SED RATE AUTOMATED: CPT

## 2024-07-05 PROCEDURE — 99214 OFFICE O/P EST MOD 30 MIN: CPT | Performed by: INTERNAL MEDICINE

## 2024-07-05 PROCEDURE — 3075F SYST BP GE 130 - 139MM HG: CPT | Performed by: INTERNAL MEDICINE

## 2024-07-05 PROCEDURE — 86140 C-REACTIVE PROTEIN: CPT

## 2024-07-05 PROCEDURE — 80053 COMPREHEN METABOLIC PANEL: CPT

## 2024-07-05 PROCEDURE — 83880 ASSAY OF NATRIURETIC PEPTIDE: CPT | Mod: GA

## 2024-07-05 PROCEDURE — 3078F DIAST BP <80 MM HG: CPT | Performed by: INTERNAL MEDICINE

## 2024-07-05 RX ORDER — KETOROLAC TROMETHAMINE 30 MG/ML
30 INJECTION, SOLUTION INTRAMUSCULAR; INTRAVENOUS ONCE
Status: COMPLETED | OUTPATIENT
Start: 2024-07-05 | End: 2024-07-05

## 2024-07-05 RX ADMIN — KETOROLAC TROMETHAMINE 30 MG: 30 INJECTION, SOLUTION INTRAMUSCULAR; INTRAVENOUS at 14:49

## 2024-07-05 ASSESSMENT — FIBROSIS 4 INDEX: FIB4 SCORE: 1.95

## 2024-07-15 ENCOUNTER — OFFICE VISIT (OUTPATIENT)
Dept: MEDICAL GROUP | Facility: LAB | Age: 69
End: 2024-07-15
Payer: MEDICARE

## 2024-07-15 VITALS
WEIGHT: 142 LBS | RESPIRATION RATE: 16 BRPM | DIASTOLIC BLOOD PRESSURE: 76 MMHG | OXYGEN SATURATION: 95 % | HEART RATE: 89 BPM | HEIGHT: 72 IN | BODY MASS INDEX: 19.23 KG/M2 | SYSTOLIC BLOOD PRESSURE: 130 MMHG | TEMPERATURE: 97.3 F

## 2024-07-15 DIAGNOSIS — M79.604 RIGHT LEG PAIN: ICD-10-CM

## 2024-07-15 DIAGNOSIS — L03.115 CELLULITIS OF RIGHT LOWER EXTREMITY: ICD-10-CM

## 2024-07-15 PROCEDURE — 99213 OFFICE O/P EST LOW 20 MIN: CPT | Performed by: INTERNAL MEDICINE

## 2024-07-15 PROCEDURE — 3075F SYST BP GE 130 - 139MM HG: CPT | Performed by: INTERNAL MEDICINE

## 2024-07-15 PROCEDURE — 3078F DIAST BP <80 MM HG: CPT | Performed by: INTERNAL MEDICINE

## 2024-07-15 RX ORDER — CEPHALEXIN 500 MG/1
500 CAPSULE ORAL 4 TIMES DAILY
Qty: 28 CAPSULE | Refills: 0 | Status: SHIPPED | OUTPATIENT
Start: 2024-07-15

## 2024-07-15 ASSESSMENT — FIBROSIS 4 INDEX: FIB4 SCORE: 2.41

## 2024-07-18 ENCOUNTER — APPOINTMENT (OUTPATIENT)
Dept: RADIOLOGY | Facility: MEDICAL CENTER | Age: 69
End: 2024-07-18
Attending: EMERGENCY MEDICINE
Payer: MEDICARE

## 2024-07-18 ENCOUNTER — HOSPITAL ENCOUNTER (EMERGENCY)
Facility: MEDICAL CENTER | Age: 69
End: 2024-07-18
Attending: EMERGENCY MEDICINE
Payer: MEDICARE

## 2024-07-18 VITALS
BODY MASS INDEX: 19.08 KG/M2 | WEIGHT: 140.87 LBS | OXYGEN SATURATION: 99 % | RESPIRATION RATE: 16 BRPM | HEART RATE: 88 BPM | HEIGHT: 72 IN | TEMPERATURE: 97.3 F | SYSTOLIC BLOOD PRESSURE: 136 MMHG | DIASTOLIC BLOOD PRESSURE: 63 MMHG

## 2024-07-18 DIAGNOSIS — R60.0 PERIPHERAL EDEMA: ICD-10-CM

## 2024-07-18 DIAGNOSIS — K50.019 CROHN'S DISEASE OF SMALL INTESTINE WITH COMPLICATION (HCC): ICD-10-CM

## 2024-07-18 DIAGNOSIS — R21 RASH: ICD-10-CM

## 2024-07-18 DIAGNOSIS — K21.9 GASTROESOPHAGEAL REFLUX DISEASE WITHOUT ESOPHAGITIS: ICD-10-CM

## 2024-07-18 LAB
ALBUMIN SERPL BCP-MCNC: 3.6 G/DL (ref 3.2–4.9)
ALBUMIN/GLOB SERPL: 1.2 G/DL
ALP SERPL-CCNC: 95 U/L (ref 30–99)
ALT SERPL-CCNC: 38 U/L (ref 2–50)
ANION GAP SERPL CALC-SCNC: 12 MMOL/L (ref 7–16)
AST SERPL-CCNC: 50 U/L (ref 12–45)
BASOPHILS # BLD AUTO: 0.6 % (ref 0–1.8)
BASOPHILS # BLD: 0.05 K/UL (ref 0–0.12)
BILIRUB SERPL-MCNC: 0.4 MG/DL (ref 0.1–1.5)
BUN SERPL-MCNC: 27 MG/DL (ref 8–22)
CALCIUM ALBUM COR SERPL-MCNC: 10.1 MG/DL (ref 8.5–10.5)
CALCIUM SERPL-MCNC: 9.8 MG/DL (ref 8.4–10.2)
CHLORIDE SERPL-SCNC: 101 MMOL/L (ref 96–112)
CO2 SERPL-SCNC: 23 MMOL/L (ref 20–33)
CREAT SERPL-MCNC: 1.09 MG/DL (ref 0.5–1.4)
CRP SERPL HS-MCNC: 1.55 MG/DL (ref 0–0.75)
EOSINOPHIL # BLD AUTO: 0.13 K/UL (ref 0–0.51)
EOSINOPHIL NFR BLD: 1.4 % (ref 0–6.9)
ERYTHROCYTE [DISTWIDTH] IN BLOOD BY AUTOMATED COUNT: 47.6 FL (ref 35.9–50)
ERYTHROCYTE [SEDIMENTATION RATE] IN BLOOD BY WESTERGREN METHOD: 20 MM/HOUR (ref 0–25)
GFR SERPLBLD CREATININE-BSD FMLA CKD-EPI: 54 ML/MIN/1.73 M 2
GLOBULIN SER CALC-MCNC: 2.9 G/DL (ref 1.9–3.5)
GLUCOSE SERPL-MCNC: 89 MG/DL (ref 65–99)
HCT VFR BLD AUTO: 36.2 % (ref 37–47)
HGB BLD-MCNC: 12 G/DL (ref 12–16)
IMM GRANULOCYTES # BLD AUTO: 0.03 K/UL (ref 0–0.11)
IMM GRANULOCYTES NFR BLD AUTO: 0.3 % (ref 0–0.9)
LYMPHOCYTES # BLD AUTO: 1.42 K/UL (ref 1–4.8)
LYMPHOCYTES NFR BLD: 15.8 % (ref 22–41)
MCH RBC QN AUTO: 31.6 PG (ref 27–33)
MCHC RBC AUTO-ENTMCNC: 33.1 G/DL (ref 32.2–35.5)
MCV RBC AUTO: 95.3 FL (ref 81.4–97.8)
MONOCYTES # BLD AUTO: 0.68 K/UL (ref 0–0.85)
MONOCYTES NFR BLD AUTO: 7.6 % (ref 0–13.4)
NEUTROPHILS # BLD AUTO: 6.66 K/UL (ref 1.82–7.42)
NEUTROPHILS NFR BLD: 74.3 % (ref 44–72)
NRBC # BLD AUTO: 0 K/UL
NRBC BLD-RTO: 0 /100 WBC (ref 0–0.2)
NT-PROBNP SERPL IA-MCNC: 473 PG/ML (ref 0–125)
PLATELET # BLD AUTO: 209 K/UL (ref 164–446)
PMV BLD AUTO: 10.1 FL (ref 9–12.9)
POTASSIUM SERPL-SCNC: 3.9 MMOL/L (ref 3.6–5.5)
PROT SERPL-MCNC: 6.5 G/DL (ref 6–8.2)
RBC # BLD AUTO: 3.8 M/UL (ref 4.2–5.4)
SODIUM SERPL-SCNC: 136 MMOL/L (ref 135–145)
WBC # BLD AUTO: 9 K/UL (ref 4.8–10.8)

## 2024-07-18 PROCEDURE — 700111 HCHG RX REV CODE 636 W/ 250 OVERRIDE (IP): Performed by: EMERGENCY MEDICINE

## 2024-07-18 PROCEDURE — 85025 COMPLETE CBC W/AUTO DIFF WBC: CPT

## 2024-07-18 PROCEDURE — 86140 C-REACTIVE PROTEIN: CPT

## 2024-07-18 PROCEDURE — 85652 RBC SED RATE AUTOMATED: CPT

## 2024-07-18 PROCEDURE — 93970 EXTREMITY STUDY: CPT

## 2024-07-18 PROCEDURE — 36415 COLL VENOUS BLD VENIPUNCTURE: CPT

## 2024-07-18 PROCEDURE — 83880 ASSAY OF NATRIURETIC PEPTIDE: CPT

## 2024-07-18 PROCEDURE — 99284 EMERGENCY DEPT VISIT MOD MDM: CPT

## 2024-07-18 PROCEDURE — 80053 COMPREHEN METABOLIC PANEL: CPT

## 2024-07-18 RX ORDER — CETIRIZINE HYDROCHLORIDE 1 MG/ML
10 SOLUTION ORAL DAILY
Qty: 473 ML | Refills: 0 | Status: SHIPPED | OUTPATIENT
Start: 2024-07-18

## 2024-07-18 RX ORDER — ONDANSETRON 4 MG/1
4 TABLET, ORALLY DISINTEGRATING ORAL ONCE
Status: DISCONTINUED | OUTPATIENT
Start: 2024-07-18 | End: 2024-07-18 | Stop reason: HOSPADM

## 2024-07-18 RX ORDER — OXYCODONE HYDROCHLORIDE 10 MG/1
10 TABLET ORAL EVERY 6 HOURS PRN
Qty: 120 TABLET | Refills: 0 | Status: SHIPPED | OUTPATIENT
Start: 2024-07-18 | End: 2024-07-19 | Stop reason: SDUPTHER

## 2024-07-18 RX ORDER — ONDANSETRON 4 MG/1
4 TABLET, ORALLY DISINTEGRATING ORAL ONCE
Status: COMPLETED | OUTPATIENT
Start: 2024-07-18 | End: 2024-07-18

## 2024-07-18 RX ADMIN — ONDANSETRON 4 MG: 4 TABLET, ORALLY DISINTEGRATING ORAL at 04:57

## 2024-07-18 ASSESSMENT — FIBROSIS 4 INDEX: FIB4 SCORE: 2.41

## 2024-07-18 NOTE — ED NOTES
Not able to obtain IV access.  Pt has a lot vein deterioration for being on TPN long term ( per Pt).  Notified Dr. Soni who discontinued IV VO and lab patricia pt's lab work.

## 2024-07-18 NOTE — ED PROVIDER NOTES
ED Provider Note        CHIEF COMPLAINT  Chief Complaint   Patient presents with    Leg Swelling     Left greater than right leg swelling chronic but worse since last Friday.    Rash     Rash on legs left greater than right; c/o itching         HPI      Jana JONE Overton is a 68 y.o. female who presents to the Emergency Department with leg swelling, mild right greater than left.  She also reports rash on both legs as well as her chest.  She reports a long history of Crohn's disease, also lupus but is not currently on any immunomodulators.  She reports that her legs are severely itchy with some discoloration.  She has been having the symptoms worsening over the course of the week, has been trying ice, elevation, and Benadryl.  The itchiness awoke her from sleep.  She was seen by her doctor previously who recommended ultrasound to rule out DVT as she does report a personal history of DVT.  She is not currently on blood thinners.    She also reports bruising over her left hip.  She denies any trauma.  She says she awoke and noticed it 1 morning.  It is sore in the area but she is able to walk on it.    REVIEW OF SYSTEMS  See HPI for further details. All other systems are negative.     PAST MEDICAL HISTORY     Past Medical History:   Diagnosis Date    Anesthesia     Difficult IV stick    Anxiety     Arthritis     all over    ASTHMA     Atrial fib/flut     Backpain     Bronchitis     5 years    Chronic pain     Colostomy in place ([facility])     COPD (chronic obstructive pulmonary disease) ([facility])     Crohn's disease of colon ([facility])     Dyspnea     History of cardiac murmur     Ileostomy present ([facility])     Infectious disease     MRSA, VancoRSA    Multiple falls     Narcotic dependence ([facility])     Obstruction     Pain     Pneumonia     child and mid 30's    Postherpetic neuralgia     Rosacea     Sleep apnea        SURGICAL HISTORY  Past Surgical History:   Procedure Laterality Date    [place] UPPER GI ENDOSCOPY,DIAGNOSIS N/A 2/8/2023     Procedure: GASTROSCOPY;  Surgeon: Jamarcus Davalos M.D.;  Location: SURGERY SAME DAY [facility];  Service: Gastroenterology    OR UPPER GI ENDOSCOPY,W/DILAT,GASTRIC OUT N/A 2/8/2023    Procedure: GASTROSCOPY, WITH BALLOON DILATION;  Surgeon: Jamarcus Davalos M.D.;  Location: SURGERY SAME DAY [facility];  Service: Gastroenterology    OR UPPER GI ENDOSCOPY,BIOPSY N/A 2/8/2023    Procedure: GASTROSCOPY, WITH BIOPSY;  Surgeon: Jamarcus Davalos M.D.;  Location: SURGERY SAME DAY [facility];  Service: Gastroenterology    OR UPPER GI ENDOSCOPY,DIAGNOSIS N/A 1/16/2023    Procedure: GASTROSCOPY;  Surgeon: Jamarcus Davalos M.D.;  Location: SURGERY SAME DAY [facility];  Service: Gastroenterology    OR UPPER GI ENDOSCOPY,W/DILAT,GASTRIC OUT N/A 1/16/2023    Procedure: GASTROSCOPY, WITH BALLOON DILATION;  Surgeon: Jamarcus Davalos M.D.;  Location: SURGERY SAME DAY [facility];  Service: Gastroenterology    OR UPPER GI ENDOSCOPY,BIOPSY N/A 1/16/2023    Procedure: GASTROSCOPY, WITH BIOPSY;  Surgeon: Jamarcus Davalos M.D.;  Location: SURGERY SAME DAY [facility];  Service: Gastroenterology    OR UPPER GI ENDOSCOPY,DIAGNOSIS N/A 10/24/2022    Procedure: GASTROSCOPY;  Surgeon: Jamarcus Davalos M.D.;  Location: SURGERY SAME DAY [facility];  Service: Gastroenterology    BILIARY DILATATION N/A 10/24/2022    Procedure: DILATION, STRICTURE, BILE DUCT;  Surgeon: Jamarcus Davalos M.D.;  Location: SURGERY SAME DAY [facility];  Service: Gastroenterology    OR CYSTOSCOPY,INSERT URETERAL STENT Right 12/23/2021    Procedure: CYSTOSCOPY, WITH URETERAL STENT EXCHANGE;  Surgeon: Jonathan Turcios M.D.;  Location: [facility];  Service: Urology    OR CYSTO/URETERO/PYELOSCOPY, DX Right 12/23/2021    Procedure: URETEROSCOPY;  Surgeon: Jonathan Turcios M.D.;  Location: [facility];  Service: Urology    OR CYSTO/URETERO/PYELOSCOPY, DX Right 12/8/2021    Procedure: URETEROSCOPY;  Surgeon: Luc Hook M.D.;  Location: SURGERY [facility];  Service: Urology    CYSTO  STENT PLACEMNT PRE SURG Right 12/8/2021    Procedure: CYSTOSCOPY, WITH URETERAL STENT INSERTION;  Surgeon: Luc Hook M.D.;  Location: [facility];  Service: Urology    ILEOSTOMY  1/20/2021    Procedure: ILEOSTOMY- COMPLEX REVISION,;  Surgeon: Kevin Cruz M.D.;  Location: [facility];  Service: General    LYSIS ADHESIONS GENERAL  1/20/2021    Procedure: LYSIS, ADHESIONS;  Surgeon: Kevin Cruz M.D.;  Location: [facility];  Service: General    GASTROSCOPY N/A 5/22/2019    Procedure: GASTROSCOPY - WITH DILATION;  Surgeon: Dionicio Cardona M.D.;  Location: SURGERY [facility];  Service: Gastroenterology    GASTROSCOPY  1/6/2019    Procedure: GASTROSCOPY- WITH DILATION;  Surgeon: Daniel Daniels M.D.;  Location: [facility];  Service: Gastroenterology    ILEOSTOMY  12/29/2018    Procedure: ILEOSTOMY- REVISION;  Surgeon: Kevin Cruz M.D.;  Location: [facility];  Service: General    SIGMOIDOSCOPY FLEX  12/29/2018    Procedure: SIGMOIDOSCOPY FLEX;  Surgeon: Kevin Cruz M.D.;  Location: [facility];  Service: General    EXPLORATORY LAPAROTOMY  12/29/2018    Procedure: EXPLORATORY LAPAROTOMY, lysis of adhesions;  Surgeon: Kevin Cruz M.D.;  Location: [facility];  Service: General    ILEOSTOMY  5/14/2014    Performed by Kevin Cruz M.D. at [facility] ORS    MAMMOPLASTY REDUCTION  7/17/2013    Performed by Janey Burt M.D. at [facility] ORS    HARDWARE REMOVAL NEURO  7/16/2012    Performed by KEELEY KIM at [facility] ORS    GASTROSCOPY  10/4/2011    Performed by TYSON MARTINEZ at SURGERY SAME DAY [facility] ORS    COLONOSCOPY  10/4/2011    Performed by TYSON MARTINEZ at SURGERY SAME DAY [facility] ORS    DILATION AND CURETTAGE  9/24/2010    Performed by GAURAV ALY at SURGERY SAME DAY [facility] ORS    ILEOSTOMY  11/11/2009    Performed by KEVIN CRUZ at [facility]  ORS    GASTROSCOPY WITH BIOPSY  11/22/08    Performed by NEGRITA HUYNH at SURGERY [facility] ORS    ABDOMINAL EXPLORATION      CERVICAL DISK AND FUSION ANTERIOR      COLON RESECTION      FOOT SURGERY      HAND SURGERY      LUMBAR FUSION ANTERIOR      LUMPECTOMY      OTHER ABDOMINAL SURGERY      surgery for chrons disease    NE BREAST REDUCTION         FAMILY HISTORY  Family History   Problem Relation Age of Onset    Lung Disease Mother         copd    Diabetes Father     Heart Disease Father     Diabetes Sister     Cancer Maternal Aunt 40        breast       SOCIAL HISTORY    reports that she has never smoked. She has never used smokeless tobacco. She reports that she does not currently use alcohol after a past usage of about 0.6 oz of alcohol per week. She reports current drug use. Drugs: Marijuana and Inhaled.    CURRENT MEDICATIONS  Home Medications       Reviewed by Blaire Gonsalez R.N. (Registered Nurse) on 07/18/24 at 0210  Med List Status: Not Addressed     Medication Last Dose Status   Ascorbic Acid (VITAMIN C PO) 7/18/2024 Active   Azelastine (ASTELIN) 137 MCG/SPRAY Solution Not Taking Active   Biotin w/ Vitamins C & E (HAIR SKIN & NAILS GUMMIES PO) 7/18/2024 Active   cephALEXin (KEFLEX) 500 MG Cap not started Active   diazePAM (VALIUM) 5 MG Tab 7/12/2024 Active   fluticasone (FLONASE) 50 MCG/ACT nasal spray prn Active   furosemide (LASIX) 20 MG Tab 7/13/2024 Active   granisetron (KYTRIL) 1 MG Tab 6/4/2024 Active   hydroxychloroquine (PLAQUENIL) 200 MG Tab Not Taking Active   levalbuterol (XOPENEX HFA) 45 MCG/ACT inhaler prn Active   lidocaine (ASPERFLEX) 4 % Patch Not Taking Active   metoprolol tartrate (LOPRESSOR) 50 MG Tab 7/15/2024 Active   Multiple Vitamins-Minerals (CENTRUM SILVER 50+WOMEN) Tab  Active   naratriptan (AMERGE) 2.5 MG tablet Not Taking Active   NON SPECIFIED  Active   nystatin (MYCOSTATIN) 327449 UNIT/ML Suspension  Active   ondansetron (ZOFRAN ODT) 4 MG TABLET DISPERSIBLE  "7/16/2024 Active   oxyCODONE HCl 10 MG/0.5ML Conc  Active   oxyCODONE-acetaminophen (ROXICET) 5-325 MG/5ML Solution  Active   potassium chloride SA (KDUR) 20 MEQ Tab CR 7/14/2024 Active   prochlorperazine (COMPAZINE) 10 MG Tab Not Taking Active   scopolamine (TRANSDERM SCOP, 1.5 MG,) 1 mg/72hr PATCH 72 HR  Active   spironolactone (ALDACTONE) 25 MG Tab Not Taking Active   traZODone (DESYREL) 50 MG Tab Not Taking Active   ZINC PICOLINATE PO Not Taking Active                  Audit from Redirected Encounters    **Home medications have not yet been reviewed for this encounter**         ALLERGIES  Allergies   Allergen Reactions    Demerol Hcl [Meperidine] Shortness of Breath and Palpitations    Methotrexate Hives, Shortness of Breath and Rash          Morphine Shortness of Breath and Palpitations    Promethazine Shortness of Breath and Rash          Remicade [Infliximab] Hives, Shortness of Breath and Rash          Sudafed [Pseudoephedrine] Shortness of Breath, Vomiting and Palpitations    Azathioprine Sodium Hives and Shortness of Breath     10/21/2022 - patient unable to verify allergy/reaction  Historical reaction listed: Hives, Shortness of Breath    Carafate [Sucralfate] Vomiting and Nausea     + Mouth Sores    Other Drug Unspecified     \"STEROIDS\" - REACTION NOT SPECIFIED    Sulfa Drugs Rash          Sulfasalazine Rash          Gabapentin Cough, Hives, Itching, Nausea, Palpitations, Photosensitivity, Rash, Runny Nose, Swelling, Unspecified and Vomiting    Amitriptyline Unspecified     Nightmares      Ativan [Lorazepam] Unspecified     Nightmares    Betamethasone Unspecified     10/21/2022 - patient unable to verify allergy/reaction  No historical reaction listed    Fentanyl Unspecified     \"Patches didn't work\" and skin broke down    Lyrica [Pregabalin] Nausea          Methadone Unspecified     \"Didn't work\"    Potassium Unspecified     Notes: In IV only  REACTION NOT SPECIFIED    Tizanidine Unspecified     " 10/21/2022 - patient unable to verify allergy/reaction  No historical reaction listed       PHYSICAL EXAM  VITAL SIGNS: /63   Pulse 88   Temp 36.2 °C (97.2 °F) (Temporal)   Resp 18   Ht 1.829 m (6')   Wt 63.9 kg (140 lb 14 oz)   SpO2 99%   BMI 19.11 kg/m²   Gen: Alert, no acute distress  HEENT: ATNC  Eyes: PERRL, EOMI, normal conjunctiva  Neck: trachea midline  Resp: no respiratory distress  CV: No JVD, regular rate and rhythm  Abd: non-distended  Ext: No deformities.  Bilateral pedal edema worse on the right.  2+ dorsalis pedis pulses bilaterally.  Distended small veins bilateral feet.  Erythematous rash on anterior shins bilaterally with some lacy component.  Worse on the right.  No induration.  Large bruise over left hip region in the process of healing  Neuro: speech fluent    DIAGNOSTIC STUDIES / PROCEDURES    LABS  Labs Reviewed   CBC WITH DIFFERENTIAL - Abnormal; Notable for the following components:       Result Value    RBC 3.80 (*)     Hematocrit 36.2 (*)     Neutrophils-Polys 74.30 (*)     Lymphocytes 15.80 (*)     All other components within normal limits   COMP METABOLIC PANEL - Abnormal; Notable for the following components:    Bun 27 (*)     AST(SGOT) 50 (*)     All other components within normal limits   PROBRAIN NATRIURETIC PEPTIDE, NT - Abnormal; Notable for the following components:    NT-proBNP 473 (*)     All other components within normal limits   CRP QUANTITIVE (NON-CARDIAC) - Abnormal; Notable for the following components:    Stat C-Reactive Protein 1.55 (*)     All other components within normal limits   ESTIMATED GFR - Abnormal; Notable for the following components:    GFR (CKD-EPI) 54 (*)     All other components within normal limits   SED RATE         RADIOLOGY  I have independently interpreted the diagnostic imaging associated with this visit.  My preliminary interpretation is as follows: Ultrasound lower extremity: No DVT  Radiologist interpretation:    US-EXTREMITY VENOUS  LOWER BILAT   Final Result         1.  No evidence of bilateral lower extremity deep venous thrombosis.          COURSE & MEDICAL DECISION MAKING  Pertinent Labs & Imaging studies were reviewed. (See chart for details)      EXTERNAL RECORDS REVIEWED  Outpatient Notes outpatient note 7/15/2024 noting cellulitis of right lower extremity, concern for DVT, ultrasound ordered.  Patient started on cephalexin      INITIAL ASSESSMENT AND PLAN  Care Narrative: Patient presents with bilateral leg swelling, worse on the right with irritation and itchiness.  The overall rash appears less to be consistent with cellulitis and more likely either associated with her autoimmune diseases or possible stasis dermatitis.    Ultrasounds of the legs performed which demonstrate no signs of DVT.  The patient shows no leukocytosis.  There is a slight elevation of CRP but no elevation of the ESR.  At this point time, low suspicion for infection.  I recommend second-generation antihistamine.  The patient has to take liquids due to her achalasia, will prescribe cetirizine.    Regarding the patient's bruising left hip, no history of trauma, she has been able to ambulate on it.  Low suspicion for hip fracture.  She has good distal circulation and no DVT on ultrasound.  No thrombocytopenia.  She is not on any blood thinners.  It does appear to be in the process of healing, low suspicion for spontaneous arterial bleed or other acutely dangerous condition, recommend conservative therapy.    ADDITIONAL PROBLEM LIST AND DISPOSITION    Escalation of care considered, and ultimately not performed: diagnostic imaging.     Patient is referred to primary care provider for blood pressure, diabetes and all other preventative health services.  Patient was given return precautions, anticipatory guidance, and the opportunity ask questions prior to discharge        FINAL IMPRESSION  1. Peripheral edema    2. Rash           DISPOSITION:  Patient will be discharged  home in stable condition.    FOLLOW UP:  Chase Charles D.O.  [address] [facility] [address]  [place] [id number] [phone]    Schedule an appointment as soon as possible for a visit       [facility], Emergency Dept  [address]  [place] [id number] [phone]    If symptoms worsen      OUTPATIENT MEDICATIONS:  New Prescriptions    CETIRIZINE (ZYRTEC) 1 MG/ML SOLUTION ORAL SOLUTION    Take 10 mL by mouth every day.          This dictation was created using voice recognition software. The accuracy of the dictation is limited to the abilities of the software. I expect there may be some errors of grammar and possibly content. The nursing notes were reviewed and certain aspects of this information were incorporated into this note.

## 2024-07-18 NOTE — ED TRIAGE NOTES
Pt here with c/o    Chief Complaint   Patient presents with    Leg Swelling     Left greater than right leg swelling chronic but worse since last Friday.    Rash     Rash on legs left greater than right; c/o itching       /74   Pulse 85   Temp 36.2 °C (97.2 °F) (Temporal)   Resp 18   Ht 1.829 m (6')   Wt 63.9 kg (140 lb 14 oz)   SpO2 92%   BMI 19.11 kg/m²     Pt also has rash on chest and hands and bruising on hips

## 2024-07-18 NOTE — ED NOTES
Pt given discharge instructions; verbalized understanding of instructions.  Ambulated out to lobby with cane.  NAD

## 2024-07-18 NOTE — DISCHARGE INSTRUCTIONS
You were seen the emerged part for a rash on your legs as well as your chest with swelling of your legs.    Thankfully the ultrasounds of your legs are reassuring.  There is no signs of blood clot.    The remainder of your workup was reassuring.  There is no signs of liver failure, heart failure, kidney failure.    At this point time there does not appear to be a skin infection.  He likely have a combination of stasis dermatitis plus possibly some complication of your autoimmune diseases.    For the itching, you may take cetirizine (Zyrtec).  You have been prescribed an oral liquid.    You may try over-the-counter steroid creams to see if this helps with your rash and itching.    For the bruising in your hip, your blood counts are reassuring, there is no evidence of low platelet counts.  The exact cause of this is not clear but your body should reabsorb the blood over time and recycle it.      Return to the emergency department or seek medical attention if you develop:  Fever, difficulty breathing, spreading redness, any other new or concerning findings

## 2024-07-19 DIAGNOSIS — K50.019 CROHN'S DISEASE OF SMALL INTESTINE WITH COMPLICATION (HCC): ICD-10-CM

## 2024-07-19 DIAGNOSIS — K21.9 GASTROESOPHAGEAL REFLUX DISEASE WITHOUT ESOPHAGITIS: ICD-10-CM

## 2024-07-22 RX ORDER — OXYCODONE HYDROCHLORIDE 10 MG/1
10 TABLET ORAL EVERY 6 HOURS PRN
Qty: 120 TABLET | Refills: 0 | Status: SHIPPED | OUTPATIENT
Start: 2024-07-22 | End: 2024-08-21

## 2024-07-23 ENCOUNTER — APPOINTMENT (OUTPATIENT)
Dept: MEDICAL GROUP | Facility: LAB | Age: 69
End: 2024-07-23
Payer: MEDICARE

## 2024-07-29 DIAGNOSIS — K22.2 GERD WITH STRICTURE: ICD-10-CM

## 2024-07-29 DIAGNOSIS — E43 EDEMA DUE TO MALNUTRITION, DUE TO UNSPECIFIED MALNUTRITION TYPE (HCC): ICD-10-CM

## 2024-07-29 DIAGNOSIS — N18.32 CHRONIC KIDNEY DISEASE, STAGE 3B: ICD-10-CM

## 2024-07-29 DIAGNOSIS — I10 ESSENTIAL HYPERTENSION: ICD-10-CM

## 2024-07-29 DIAGNOSIS — R11.2 NAUSEA AND VOMITING, UNSPECIFIED VOMITING TYPE: ICD-10-CM

## 2024-07-29 DIAGNOSIS — K21.9 GERD WITH STRICTURE: ICD-10-CM

## 2024-07-29 RX ORDER — ONDANSETRON 4 MG/1
4 TABLET, ORALLY DISINTEGRATING ORAL EVERY 6 HOURS PRN
Qty: 20 TABLET | Refills: 5 | Status: SHIPPED | OUTPATIENT
Start: 2024-07-29

## 2024-07-29 RX ORDER — SPIRONOLACTONE 25 MG/1
25 TABLET ORAL DAILY
Qty: 90 TABLET | Refills: 0 | Status: CANCELLED | OUTPATIENT
Start: 2024-07-29

## 2024-07-29 RX ORDER — POTASSIUM CHLORIDE 20 MEQ/1
20 TABLET, EXTENDED RELEASE ORAL 2 TIMES DAILY
Qty: 180 TABLET | Refills: 1 | Status: SHIPPED | OUTPATIENT
Start: 2024-07-29

## 2024-07-29 RX ORDER — METOPROLOL TARTRATE 50 MG/1
50 TABLET, FILM COATED ORAL 2 TIMES DAILY
Qty: 180 TABLET | Refills: 3 | Status: SHIPPED | OUTPATIENT
Start: 2024-07-29

## 2024-07-30 RX ORDER — FUROSEMIDE 20 MG/1
20 TABLET ORAL DAILY
Qty: 100 TABLET | Refills: 0 | Status: SHIPPED | OUTPATIENT
Start: 2024-07-30

## 2024-07-30 RX ORDER — SPIRONOLACTONE 25 MG/1
25 TABLET ORAL DAILY
Qty: 90 TABLET | Refills: 0 | Status: SHIPPED | OUTPATIENT
Start: 2024-07-30

## 2024-07-31 DIAGNOSIS — R25.2 SPASM: ICD-10-CM

## 2024-07-31 DIAGNOSIS — L93.2 DRUG-INDUCED LUPUS ERYTHEMATOSUS: ICD-10-CM

## 2024-07-31 DIAGNOSIS — T50.905A DRUG-INDUCED LUPUS ERYTHEMATOSUS: ICD-10-CM

## 2024-07-31 RX ORDER — DIAZEPAM 5 MG/1
5 TABLET ORAL EVERY 6 HOURS PRN
Qty: 60 TABLET | Refills: 2 | Status: SHIPPED | OUTPATIENT
Start: 2024-07-31 | End: 2024-10-29

## 2024-08-01 ENCOUNTER — TELEPHONE (OUTPATIENT)
Dept: MEDICAL GROUP | Facility: LAB | Age: 69
End: 2024-08-01
Payer: MEDICARE

## 2024-08-01 ENCOUNTER — PATIENT MESSAGE (OUTPATIENT)
Dept: MEDICAL GROUP | Facility: LAB | Age: 69
End: 2024-08-01
Payer: MEDICARE

## 2024-08-01 DIAGNOSIS — Z63.5 MARITAL DISRUPTION INVOLVING ESTRANGEMENT: ICD-10-CM

## 2024-08-01 DIAGNOSIS — G89.29 CENTRAL SENSITIZATION TO PAIN: ICD-10-CM

## 2024-08-01 DIAGNOSIS — K50.019 CROHN'S DISEASE OF SMALL INTESTINE WITH COMPLICATION (HCC): ICD-10-CM

## 2024-08-01 SDOH — SOCIAL STABILITY - SOCIAL INSECURITY: DISRUPTION OF FAMILY BY SEPARATION AND DIVORCE: Z63.5

## 2024-08-01 NOTE — TELEPHONE ENCOUNTER
Reviewed patient's chart regarding issues associate with her ability to drive, I had not personally recognized issues with impaired cognitive behavior, patient's last testing was done when she was under a legal hold done by the hospital was later removed by court order.

## 2024-08-02 ENCOUNTER — APPOINTMENT (OUTPATIENT)
Dept: MEDICAL GROUP | Facility: LAB | Age: 69
End: 2024-08-02
Payer: MEDICARE

## 2024-08-06 ENCOUNTER — PATIENT MESSAGE (OUTPATIENT)
Dept: MEDICAL GROUP | Facility: LAB | Age: 69
End: 2024-08-06
Payer: MEDICARE

## 2024-08-06 DIAGNOSIS — R25.2 SPASM: ICD-10-CM

## 2024-08-06 RX ORDER — DIAZEPAM 5 MG
5 TABLET ORAL EVERY 6 HOURS PRN
Qty: 60 TABLET | Refills: 2 | Status: SHIPPED | OUTPATIENT
Start: 2024-08-06 | End: 2024-08-08 | Stop reason: SDUPTHER

## 2024-08-08 DIAGNOSIS — R25.2 SPASM: ICD-10-CM

## 2024-08-08 RX ORDER — HYDROXYCHLOROQUINE SULFATE 200 MG/1
200 TABLET, FILM COATED ORAL DAILY
Qty: 90 TABLET | Refills: 3 | Status: SHIPPED | OUTPATIENT
Start: 2024-08-08

## 2024-08-08 RX ORDER — DIAZEPAM 5 MG
5 TABLET ORAL EVERY 6 HOURS PRN
Qty: 60 TABLET | Refills: 2 | Status: SHIPPED | OUTPATIENT
Start: 2024-08-08 | End: 2024-08-09

## 2024-08-09 RX ORDER — DIAZEPAM 5 MG
TABLET ORAL
Qty: 30 TABLET | Refills: 2 | Status: SHIPPED | OUTPATIENT
Start: 2024-08-09 | End: 2024-11-07

## 2024-08-09 NOTE — TELEPHONE ENCOUNTER
Received request via: Pharmacy    Was the patient seen in the last year in this department? Yes7/15/24    Does the patient have an active prescription (recently filled or refills available) for medication(s) requested? No    Pharmacy Name: [email or web address]    Does the patient have jail Plus and need 100-day supply? (This applies to ALL medications) Patient does not have SCP

## 2024-08-15 ENCOUNTER — OFFICE VISIT (OUTPATIENT)
Dept: MEDICAL GROUP | Facility: LAB | Age: 69
End: 2024-08-15
Payer: MEDICARE

## 2024-08-15 VITALS
HEIGHT: 72 IN | SYSTOLIC BLOOD PRESSURE: 120 MMHG | TEMPERATURE: 98.4 F | OXYGEN SATURATION: 95 % | RESPIRATION RATE: 18 BRPM | BODY MASS INDEX: 18.01 KG/M2 | WEIGHT: 133 LBS | DIASTOLIC BLOOD PRESSURE: 78 MMHG | HEART RATE: 90 BPM

## 2024-08-15 DIAGNOSIS — K21.9 GASTROESOPHAGEAL REFLUX DISEASE WITHOUT ESOPHAGITIS: ICD-10-CM

## 2024-08-15 DIAGNOSIS — R19.7 DIARRHEA, UNSPECIFIED TYPE: ICD-10-CM

## 2024-08-15 DIAGNOSIS — T78.40XA ALLERGY, INITIAL ENCOUNTER: ICD-10-CM

## 2024-08-15 DIAGNOSIS — K50.019 CROHN'S DISEASE OF SMALL INTESTINE WITH COMPLICATION (HCC): ICD-10-CM

## 2024-08-15 DIAGNOSIS — Z12.11 SCREENING FOR COLORECTAL CANCER: ICD-10-CM

## 2024-08-15 DIAGNOSIS — E43 EDEMA DUE TO MALNUTRITION, DUE TO UNSPECIFIED MALNUTRITION TYPE (HCC): ICD-10-CM

## 2024-08-15 DIAGNOSIS — Z12.12 SCREENING FOR COLORECTAL CANCER: ICD-10-CM

## 2024-08-15 DIAGNOSIS — N18.32 CHRONIC KIDNEY DISEASE, STAGE 3B: ICD-10-CM

## 2024-08-15 PROCEDURE — 3074F SYST BP LT 130 MM HG: CPT | Performed by: INTERNAL MEDICINE

## 2024-08-15 PROCEDURE — 3078F DIAST BP <80 MM HG: CPT | Performed by: INTERNAL MEDICINE

## 2024-08-15 PROCEDURE — 99214 OFFICE O/P EST MOD 30 MIN: CPT | Performed by: INTERNAL MEDICINE

## 2024-08-15 RX ORDER — OXYCODONE HYDROCHLORIDE 10 MG/1
10 TABLET ORAL EVERY 6 HOURS PRN
Qty: 90 TABLET | Refills: 0 | Status: SHIPPED | OUTPATIENT
Start: 2024-08-15 | End: 2024-09-14

## 2024-08-15 RX ORDER — POTASSIUM CHLORIDE 1500 MG/1
20 TABLET, EXTENDED RELEASE ORAL 2 TIMES DAILY
Qty: 180 TABLET | Refills: 1 | Status: SHIPPED | OUTPATIENT
Start: 2024-08-15

## 2024-08-15 RX ORDER — CETIRIZINE HYDROCHLORIDE 1 MG/ML
10 SOLUTION ORAL DAILY
Qty: 150 ML | Refills: 3 | Status: SHIPPED | OUTPATIENT
Start: 2024-08-15

## 2024-08-15 ASSESSMENT — FIBROSIS 4 INDEX: FIB4 SCORE: 2.76

## 2024-08-16 NOTE — PROGRESS NOTES
CC: Jana Overton is a 69 y.o. female is suffering from   Chief Complaint   Patient presents with    Follow-Up         SUBJECTIVE:  1. Allergy, initial encounter  Jana is here for follow-up, has a history of significant allergies, patient also has esophageal dysphagia, patient is to have liquid Zyrtec to treat her allergies    2. Crohn's disease of small intestine with complication (HCC)  History of Crohn's disease with colostomy    3. Gastroesophageal reflux disease without esophagitis  Patient with gastroesophageal reflux disease with previous esophageal dilatation    4. Chronic kidney disease, stage 3b  Chronic kidney disease stage IIIb    5. Edema due to malnutrition, due to unspecified malnutrition type (HCC)  Edema likely secondary to malnutrition    6. Diarrhea, unspecified type  Continuing issues associate with diarrhea    7. Screening for colorectal cancer  Patient needs screening for colorectal cancer    History of Present Illness      Past social, family, history: ,   Social History     Tobacco Use    Smoking status: Never    Smokeless tobacco: Never    Tobacco comments:     second hand smoke parents - smoked for only 1 year many years ago   Vaping Use    Vaping status: Every Day    Substances: THC   Substance Use Topics    Alcohol use: Not Currently     Alcohol/week: 0.6 oz     Types: 1 Shots of liquor per week     Comment: gin and half a lime; tonic water    Drug use: Yes     Types: Marijuana, Inhaled     Comment: medical marijuana through bong/vape         MEDICATIONS:    Current Outpatient Medications:     cetirizine (ZYRTEC) 1 MG/ML Solution oral solution, Take 10 mL by mouth every day., Disp: 150 mL, Rfl: 3    oxyCODONE immediate release (ROXICODONE) 10 MG immediate release tablet, Take 1 Tablet by mouth every 6 hours as needed for Moderate Pain for up to 30 days., Disp: 90 Tablet, Rfl: 0    potassium chloride SA (KDUR) 20 MEQ Tab CR, Take 1 Tablet by mouth 2 times a day., Disp:  180 Tablet, Rfl: 1    diazePAM (VALIUM) 5 MG Tab, TAKE 1 TABLET BY MOUTH EVERY 6 HOURS AS NEEDED FOR SPASMS, Disp: 30 Tablet, Rfl: 2    hydroxychloroquine (PLAQUENIL) 200 MG Tab, Take 1 Tablet by mouth every day., Disp: 90 Tablet, Rfl: 3    furosemide (LASIX) 20 MG Tab, Take 1 Tablet by mouth every day., Disp: 100 Tablet, Rfl: 0    spironolactone (ALDACTONE) 25 MG Tab, Take 1 Tablet by mouth every day., Disp: 90 Tablet, Rfl: 0    ondansetron (ZOFRAN ODT) 4 MG TABLET DISPERSIBLE, Take 1 Tablet by mouth every 6 hours as needed for Nausea/Vomiting., Disp: 20 Tablet, Rfl: 5    metoprolol tartrate (LOPRESSOR) 50 MG Tab, Take 1 Tablet by mouth 2 times a day., Disp: 180 Tablet, Rfl: 3    oxyCODONE immediate release (ROXICODONE) 10 MG immediate release tablet, Take 1 Tablet by mouth every 6 hours as needed for Moderate Pain for up to 30 days., Disp: 120 Tablet, Rfl: 0    cetirizine (ZYRTEC) 1 MG/ML Solution oral solution, Take 10 mL by mouth every day., Disp: 473 mL, Rfl: 0    cephALEXin (KEFLEX) 500 MG Cap, Take 1 Capsule by mouth 4 times a day., Disp: 28 Capsule, Rfl: 0    lidocaine (ASPERFLEX) 4 % Patch, Place 1 Patch on the skin every 24 hours. (Patient not taking: Reported on 7/18/2024), Disp: 30 Patch, Rfl: 2    NON SPECIFIED, [facility] Sacroiliac belt., Disp: 1 Each, Rfl: 1    scopolamine (TRANSDERM SCOP, 1.5 MG,) 1 mg/72hr PATCH 72 HR, Place 1 Patch on the skin every 72 hours., Disp: 4 Patch, Rfl: 3    Azelastine (ASTELIN) 137 MCG/SPRAY Solution, Administer 1 Spray into each nostril at bedtime. (Patient not taking: Reported on 7/18/2024), Disp: , Rfl:     fluticasone (FLONASE) 50 MCG/ACT nasal spray, Administer 1 Spray into each nostril at bedtime., Disp: , Rfl:     nystatin (MYCOSTATIN) 078829 UNIT/ML Suspension, Take 5 mL by mouth 4 times a day., Disp: 60 mL, Rfl: 0    prochlorperazine (COMPAZINE) 10 MG Tab, Take 1 Tablet by mouth every 6 hours as needed for Nausea/Vomiting. (Patient not taking:  Reported on 7/18/2024), Disp: 30 Tablet, Rfl: 3    traZODone (DESYREL) 50 MG Tab, Take 1 Tablet by mouth every evening. (Patient not taking: Reported on 7/18/2024), Disp: 90 Tablet, Rfl: 3    granisetron (KYTRIL) 1 MG Tab, Take 1 Tablet by mouth every 12 hours as needed for Nausea/Vomiting., Disp: 10 Tablet, Rfl: 0    levalbuterol (XOPENEX HFA) 45 MCG/ACT inhaler, Inhale 2 Puffs every four hours as needed for Shortness of Breath (As needed for shortness of breath, cough, wheezing.)., Disp: 1 Each, Rfl: 11    Multiple Vitamins-Minerals (CENTRUM SILVER 50+WOMEN) Tab, Take 1 Tablet by mouth 2 times a day. GUMMIES., Disp: , Rfl:     ZINC PICOLINATE PO, Take 1 Tablet by mouth 2 times a day. (Patient not taking: Reported on 7/18/2024), Disp: , Rfl:     Ascorbic Acid (VITAMIN C PO), Take 1 Tablet by mouth 2 times a day., Disp: , Rfl:     Biotin w/ Vitamins C & E (HAIR SKIN & NAILS GUMMIES PO), Take 1 Tablet by mouth 2 times a day., Disp: , Rfl:     naratriptan (AMERGE) 2.5 MG tablet, Take 1 Tablet by mouth as needed for Migraine. (Patient not taking: Reported on 7/18/2024), Disp: 5 Tablet, Rfl: 3    PROBLEMS:  Patient Active Problem List    Diagnosis Date Noted    Thrombocytopenia (HCC) 12/11/2023    Chronic diastolic congestive heart failure (HCC) 12/11/2023    Chronic kidney disease, stage 3b 11/22/2023    Transient alteration of awareness 10/26/2023    Achalasia 10/18/2023    Esophageal candidiasis (HCC) 02/08/2023    Sacroiliac joint pain 11/10/2022    Esophageal stricture 10/22/2022    Dysphagia 10/21/2022    Drug-induced lupus erythematosus 09/27/2022    Long-term use of hydroxychloroquine 09/27/2022    Fibromyalgia syndrome 09/27/2022    Positive cardiolipin antibodies (IgA and IgG anti-CL) 07/27/2022    Positive VAHID (1:640 homogenous pattern with negative reflex) 07/27/2022    Inflammatory arthritis 07/27/2022    Mild tetrahydrocannabinol (THC) abuse 06/02/2022    Oropharyngeal dysphagia 06/02/2022    Acute  pancreatitis 05/23/2022    Migraine 06/04/2019    Essential hypertension 05/20/2019    SIRS (systemic inflammatory response syndrome) ([facility]) 05/19/2019    History of MRSA infection 12/29/2018    History of infection with vancomycin resistant Enterococcus (VRE) 12/29/2018    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[facility]) 12/28/2018    Anemia 12/15/2018    Hyponatremia 12/12/2018    Leukocytosis 12/12/2018    Anxiety disorder due to multiple medical problems 12/01/2017    DDD (degenerative disc disease), lumbar 04/12/2017    History of DVT (deep vein thrombosis) 11/23/2016    Atrial fibrillation ([facility]) 03/26/2015    Sleep apnea 10/26/2014    Postherpetic neuralgia 06/24/2014    Multiple falls 06/24/2014    COPD (chronic obstructive pulmonary disease) ([facility]) 06/24/2014    Rosacea 06/24/2014    Ileostomy in place ([facility]) 06/26/2013    GERD (gastroesophageal reflux disease) 12/05/2012    Status post lumbar surgery 07/16/2012    Crohn's disease of small intestine with complication ([facility]) 09/23/2009    Central sensitization to pain 09/23/2009    Opioid type dependence, continuous (CMS-HCC) 09/23/2009    Degenerative joint disease of cervical and lumbar spine 09/23/2009       REVIEW OF SYSTEMS:  Gen.:  No Nausea, Vomiting, fever, Chills.  Heart: No chest pain.  Lungs:  No shortness of Breath.  Psychological: Rg unusual Anxiety depression     PHYSICAL EXAM   Physical Exam       Constitutional: Alert, cooperative, not in acute distress.  Cardiovascular:  Rate Rhythm is regular without murmurs rubs clicks.     Thorax & Lungs: Clear to auscultation, no wheezing, rhonchi, or rales  HENT: Normocephalic, Atraumatic.  Eyes: PERRLA, EOMI, Conjunctiva normal.   Neck: Trachia is midline no swelling of the thyroid.   Lymphatic: No lymphadenopathy noted.   Neurologic: Alert & oriented x 3, cranial nerves II through XII are intact, Normal motor function, Normal sensory function, No focal deficits noted.   Psychiatric: Affect normal, Judgment normal, Mood normal.      VITAL SIGNS:/78 (BP Location: Left arm, Patient Position: Sitting, BP Cuff Size: Adult)   Pulse 90   Temp 36.9 °C (98.4 °F)   Resp 18   Ht 1.829 m (6')   Wt 60.3 kg (133 lb)   SpO2 95%   BMI 18.04 kg/m²     Labs: Reviewed    Assessment:                                                     Plan:  Assessment & Plan       1. Allergy, initial encounter  Continue Zyrtec liquid  - cetirizine (ZYRTEC) 1 MG/ML Solution oral solution; Take 10 mL by mouth every day.  Dispense: 150 mL; Refill: 3    2. Crohn's disease of small intestine with complication (HCC)  Continue oxycodone decrease dosage to 90 tablets/month  - oxyCODONE immediate release (ROXICODONE) 10 MG immediate release tablet; Take 1 Tablet by mouth every 6 hours as needed for Moderate Pain for up to 30 days.  Dispense: 90 Tablet; Refill: 0    3. Gastroesophageal reflux disease without esophagitis  Continue evaluation by gastroenterology with esophageal dilatation as requested  - oxyCODONE immediate release (ROXICODONE) 10 MG immediate release tablet; Take 1 Tablet by mouth every 6 hours as needed for Moderate Pain for up to 30 days.  Dispense: 90 Tablet; Refill: 0    4. Chronic kidney disease, stage 3b  Continue potassium chloride  - potassium chloride SA (KDUR) 20 MEQ Tab CR; Take 1 Tablet by mouth 2 times a day.  Dispense: 180 Tablet; Refill: 1    5. Edema due to malnutrition, due to unspecified malnutrition type (HCC)  No change in medical therapy  - potassium chloride SA (KDUR) 20 MEQ Tab CR; Take 1 Tablet by mouth 2 times a day.  Dispense: 180 Tablet; Refill: 1    6. Diarrhea, unspecified type  As detailed above  - oxyCODONE immediate release (ROXICODONE) 10 MG immediate release tablet; Take 1 Tablet by mouth every 6 hours as needed for Moderate Pain for up to 30 days.  Dispense: 90 Tablet; Refill: 0    7. Screening for colorectal cancer  Referral written to gastroenterology  - Referral to GI for Colonoscopy

## 2024-08-20 ENCOUNTER — OFFICE VISIT (OUTPATIENT)
Dept: RHEUMATOLOGY | Facility: MEDICAL CENTER | Age: 69
End: 2024-08-20
Attending: STUDENT IN AN ORGANIZED HEALTH CARE EDUCATION/TRAINING PROGRAM
Payer: MEDICARE

## 2024-08-20 VITALS
HEIGHT: 72 IN | HEART RATE: 73 BPM | WEIGHT: 131 LBS | SYSTOLIC BLOOD PRESSURE: 108 MMHG | TEMPERATURE: 98 F | BODY MASS INDEX: 17.74 KG/M2 | DIASTOLIC BLOOD PRESSURE: 62 MMHG | OXYGEN SATURATION: 95 %

## 2024-08-20 DIAGNOSIS — L93.2 DRUG-INDUCED LUPUS ERYTHEMATOSUS: ICD-10-CM

## 2024-08-20 DIAGNOSIS — M79.7 FIBROMYALGIA SYNDROME: ICD-10-CM

## 2024-08-20 DIAGNOSIS — M19.041 PRIMARY OSTEOARTHRITIS OF BOTH HANDS: ICD-10-CM

## 2024-08-20 DIAGNOSIS — T50.905A DRUG-INDUCED LUPUS ERYTHEMATOSUS: ICD-10-CM

## 2024-08-20 DIAGNOSIS — K50.019 CROHN'S DISEASE OF SMALL INTESTINE WITH COMPLICATION (HCC): ICD-10-CM

## 2024-08-20 DIAGNOSIS — Z79.899 LONG-TERM USE OF HYDROXYCHLOROQUINE: ICD-10-CM

## 2024-08-20 DIAGNOSIS — M19.042 PRIMARY OSTEOARTHRITIS OF BOTH HANDS: ICD-10-CM

## 2024-08-20 DIAGNOSIS — M47.816 DEGENERATIVE JOINT DISEASE OF CERVICAL AND LUMBAR SPINE: ICD-10-CM

## 2024-08-20 DIAGNOSIS — M47.812 DEGENERATIVE JOINT DISEASE OF CERVICAL AND LUMBAR SPINE: ICD-10-CM

## 2024-08-20 PROCEDURE — 99212 OFFICE O/P EST SF 10 MIN: CPT | Performed by: STUDENT IN AN ORGANIZED HEALTH CARE EDUCATION/TRAINING PROGRAM

## 2024-08-20 PROCEDURE — 3078F DIAST BP <80 MM HG: CPT | Performed by: STUDENT IN AN ORGANIZED HEALTH CARE EDUCATION/TRAINING PROGRAM

## 2024-08-20 PROCEDURE — 99214 OFFICE O/P EST MOD 30 MIN: CPT | Performed by: STUDENT IN AN ORGANIZED HEALTH CARE EDUCATION/TRAINING PROGRAM

## 2024-08-20 PROCEDURE — 3074F SYST BP LT 130 MM HG: CPT | Performed by: STUDENT IN AN ORGANIZED HEALTH CARE EDUCATION/TRAINING PROGRAM

## 2024-08-20 ASSESSMENT — PATIENT HEALTH QUESTIONNAIRE - PHQ9: CLINICAL INTERPRETATION OF PHQ2 SCORE: 0

## 2024-08-20 ASSESSMENT — FIBROSIS 4 INDEX: FIB4 SCORE: 2.76

## 2024-08-20 NOTE — PATIENT INSTRUCTIONS
[facility]    Below are important guidelines to help you navigate your health care needs and assist us in caring for you safely and effectively. We encourage you to carefully read and understand this information and adhere to them accordingly.    [email or web address] Messaging and Phone Calls:  Diagnosis and Treatment - For a detailed explanation of your condition and treatment plan from today's visit, refer to the visit note on [email or web address] via the following steps:  Log in to [email or web address] and click on “Visits” at the top.  Scroll down to “Past Visits” under Appointments.  Click on “View Notes” under the appropriate visit date.  Questions or Concerns - MyChart messaging is for non-urgent matters that do not require immediate attention and should be brief with no more than two questions or concerns. If you have multiple questions or concerns, we ask that you schedule an appointment to have them properly addressed.  Response to Messages - [email or web address] messages are addressed throughout the week depending on clinical availability, so we ask that you allow up to one week for a response.  Phone Calls and Voicemails - Phone calls and voicemail messages are reserved for time-sensitive matters that cannot wait to be addressed via [email or web address]. We ask that you refrain from calling the office multiple times or leaving multiple voicemails regarding the same issue as doing so may lead to delays in response time.  Urgent Issues - For urgent medical matters or medical emergencies that cannot wait, you are advised to go to your nearest Urgent Care or Emergency Department for immediate attention.    Laboratory Tests and Imaging Studies:  Future Lab and Imaging Orders - We ask that you get your lab tests and imaging studies done no later than one week before your follow-up visit unless instructed otherwise.  Results Communication - You may see some test results marked as “abnormal” that are not necessarily significant or concerning. If  there are significant abnormalities on your test results that warrant further action, you will be notified via MyChart or phone call, otherwise they will be addressed at your follow-up visit.    Prescriptions and Refill Requests:  General Prescriptions (e.g. prednisone, hydroxychloroquine, leflunomide, methotrexate, etc.) - These are sent to Retail Pharmacies, so all refill requests of these medications should be directed to your local pharmacy.  Specialty Prescriptions (e.g. Enbrel, Humira, Cosentyx, Xeljanz, etc.) - These are sent to Specialty Pharmacies, so all refill requests of these medications should be directed to your designated specialty pharmacy.  Infusion Prescriptions (e.g. Remicade, Simponi Aria, Rituxan, Saphnelo, etc.) - These are sent to Outpatient Infusion Centers, so all scheduling requests of these medications should be directed to your local infusion center.    Medication Risks and Adverse Effects:  Immunosuppressed Status - Steroids and antirheumatic drugs are immunosuppressants, so they increase the risk of infections and can have side effects on various organ systems in your body, though most of them are uncommon.  Potential Side Effects - Be sure to read the drug package inserts to learn about the potential side effects of your medications before you start taking them and take them exactly as prescribed unless instructed otherwise.  In Case of Side Effects - If you experience any significant side effects, stop taking the medication immediately and promptly notify the prescriber. Depending on the severity of the side effects, consider going to an Urgent Care or Emergency Department for immediate attention.    Immunizations and Health Screening:  Vaccinations - If you are on immunosuppressive therapy, it is important that you are up to date on age-appropriate immunizations, particularly shingles and pneumonia vaccines, which you can request from your primary care provider or from us at your  next appointment.  Screening Tests - It is also important that you are up to date on age-appropriate screening tests, such as pap smear, mammography, and colonoscopy, which you can request from your primary care provider.    Educational and Supportive Resources:  [employer] [facility] ([email or web address]) - Visit our website to learn more about your condition and other rheumatic diseases, and gain access to many helpful resources for them.  Disposal of Old Medications (www.[place].gov/everyday-takeback-day) - Visit the Drug Enforcement Administration website to find a nearby location where you can properly dispose of old medications you no longer need.  Disposal of Used [place] (www.safeneedledisposal.org) - Visit the Safe Needle Disposal Organization website to find a nearby location where you can properly dispose of used needles from your injectable medications.

## 2024-08-20 NOTE — PROGRESS NOTES
[facility] RHEUMATOLOGY  [address], [address], [place] [address]  Phone: [phone] ? Fax: [phone]  [facility]/Health-Services/Rheumatology    FOLLOW-UP VISIT NOTE      DATE OF SERVICE: 08/20/2024         Subjective     PRIMARY CARE PRACTITIONER:  Chase Charles D.O.  [address]  [place] [address]    PATIENT IDENTIFICATION:  Jana Overton  [address] [place] [address]    YOB: 1955    MEDICAL RECORD NUMBER: [id number]          CHIEF COMPLAINT:   Chief Complaint   Patient presents with    Follow-Up     Drug-induced lupus erythematosus       RHEUMATOLOGIC HISTORY:  Jana Overton is a 69 y.o. female with pertinent history notable for drug-induced lupus diagnosed in 7/2022 (presumably due to her past use of hydralazine and/or Remicade), Crohn's disease diagnosed in late 1990s/early 2000s s/p ileostomy/stoma, DJD/DDD of cervical/lumbar spines s/p surgical fusions, fibromyalgia/chronic pain syndrome since the 1990s (under the care of pain management), and multiple comorbidities. She initially presented on 7/27/22 for evaluation in the setting of positive VAHID that raised concern for autoimmune disease. Reported widespread joint/muscle pain involving her hands, elbows, shoulders, neck, upper/mid/lower back, knees, ankles, and feet feet. These were associated with all day joint stiffness that improved with activity but her joint/muscle pain tended to worsen with much activity such that she felt exhausted by the end of the day. She additionally reported chronic fatigue with muscle weakness, photosensitive rash on her face/extremities, Raynaud's phenomenon on her fingers/toes, dry eyes with redness/pain, dry mouth with episodic ulcers, and numerous nonspecific symptoms from multiple organ systems. She noted that she had been trying to manage with natural/alternative medicines to avoid needing to take immunosuppressive medications but experienced only modest relief  until she was started on hydroxychloroquine.     Pertinent treatments: Remicade for Crohn's disease (last infusion in early 2000s), Neurontin and Lyrica (neither of which was very helpful), hydroxychloroquine 300>200 mg daily (started 8/2022, dose decreased 11/29/23-present, effective).     Pertinent lab results history: Positive VAHID speckled<homogenous pattern 1:80<1:640 (in 10/2023<7/2022); positive anti-histone 2.9 and anti-chromatin 24 (in 7/2022); positive IgA anti-CL 24 and IgG anti-CL 19 with negative IgM anti-CL (in 10/2021); positive anti-MOG 1:640 with otherwise negative autoimmune encephalitis extended panel (in 10/2023); elevated CRP 1.55 with normal ESR 20, and low eGFR 54 with creatinine 1.09 (in 7/2024).     Pertinent negative labs: Negative ANCA (in 10/2021), RF, anti-CCP, HLA-B27, anti-TPO, anti-TG, CH50, CPK, TSH, and vitamin D (in 7/2022), paraneoplastic autoantibody panel, CSF autoimmune encephalopathy panel, CSF infection analysis, syphilis, FT4 and FT3 (in 10/2023), C3 and C4 (in 11/2023), and acceptable LFTs and CBC (in 7/2024).     Pertinent XR imaging: Hands (in 7/2022) with mild osteoarthritis of PIP and DIP joints, but no evidence of inflammatory arthropath.      INTERVAL HISTORY:  Reports interval history as noted on the questionnaire below or scanned under media tab.  Notably between April and June she experienced diffuse rash throughout her body, groin and legs with edema/skin infection requiring hospitalization and treatment with antibiotics and antihistamine with significant improvement.  Lymph node swelling around ears, jaws, and groin during that time, diffuse musculoskeletal pain and variable degrees of previously reported symptoms as noted above under rheumatologic history.  Noticed worsening of her musculoskeletal symptoms when hydroxychloroquine was held during her hospitalization, but has been feeling better after resuming it and presently feeling okay otherwise.    REVIEW OF  SYSTEMS:  Except as noted in the history above, relevant review of systems with emphasis on autoimmune rheumatic diseases was otherwise negative.      ACTIVE PROBLEM LIST:  Patient Active Problem List    Diagnosis Date Noted    Thrombocytopenia (Formerly [facility]) 12/11/2023    Chronic diastolic congestive heart failure (Formerly [facility]) 12/11/2023    Chronic kidney disease, stage 3b 11/22/2023    Transient alteration of awareness 10/26/2023    Achalasia 10/18/2023    Esophageal candidiasis (Formerly [facility]) 02/08/2023    Sacroiliac joint pain 11/10/2022    Esophageal stricture 10/22/2022    Dysphagia 10/21/2022    Drug-induced lupus erythematosus 09/27/2022    Long-term use of hydroxychloroquine 09/27/2022    Fibromyalgia syndrome 09/27/2022    Primary osteoarthritis of both hands 07/28/2022    Positive cardiolipin antibodies (IgA and IgG anti-CL) 07/27/2022    Positive VAHID (1:640 homogenous pattern with negative reflex) 07/27/2022    Inflammatory arthritis 07/27/2022    Mild tetrahydrocannabinol (THC) abuse 06/02/2022    Oropharyngeal dysphagia 06/02/2022    Acute pancreatitis 05/23/2022    Migraine 06/04/2019    Essential hypertension 05/20/2019    SIRS (systemic inflammatory response syndrome) (Formerly [facility]) 05/19/2019    History of MRSA infection 12/29/2018    History of infection with vancomycin resistant Enterococcus (VRE) 12/29/2018    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Formerly [facility]) 12/28/2018    Anemia 12/15/2018    Hyponatremia 12/12/2018    Leukocytosis 12/12/2018    Anxiety disorder due to multiple medical problems 12/01/2017    DDD (degenerative disc disease), lumbar 04/12/2017    History of DVT (deep vein thrombosis) 11/23/2016    Atrial fibrillation (Formerly [facility]) 03/26/2015    Sleep apnea 10/26/2014    Postherpetic neuralgia 06/24/2014    Multiple falls 06/24/2014    COPD (chronic obstructive pulmonary disease) (Formerly [facility]) 06/24/2014    Rosacea 06/24/2014    Ileostomy in place (Formerly [facility]) 06/26/2013    GERD (gastroesophageal reflux disease) 12/05/2012    Status post lumbar surgery  07/16/2012    Crohn's disease of small intestine with complication ([facility]) 09/23/2009    Central sensitization to pain 09/23/2009    Opioid type dependence, continuous (CMS-HCC) 09/23/2009    Degenerative joint disease of cervical and lumbar spine 09/23/2009       PAST MEDICAL HISTORY:  Past Medical History:   Diagnosis Date    Anesthesia     Difficult IV stick    Anxiety     Arthritis     all over    ASTHMA     Atrial fib/flut     Backpain     Bronchitis     5 years    Chronic pain     Colostomy in place ([facility])     COPD (chronic obstructive pulmonary disease) ([facility])     Crohn's disease of colon ([facility])     Dyspnea     History of cardiac murmur     Ileostomy present ([facility])     Infectious disease     MRSA, VancoRSA    Multiple falls     Narcotic dependence ([facility])     Obstruction     Pain     Pneumonia     child and mid 30's    Postherpetic neuralgia     Rosacea     Sleep apnea        PAST SURGICAL HISTORY:  Past Surgical History:   Procedure Laterality Date    MT UPPER GI ENDOSCOPY,DIAGNOSIS N/A 2/8/2023    Procedure: GASTROSCOPY;  Surgeon: Jamarcus Davalos M.D.;  Location: SURGERY SAME DAY [facility];  Service: Gastroenterology    MT UPPER GI ENDOSCOPY,W/DILAT,GASTRIC OUT N/A 2/8/2023    Procedure: GASTROSCOPY, WITH BALLOON DILATION;  Surgeon: Jamarcus Davalos M.D.;  Location: SURGERY SAME DAY [facility];  Service: Gastroenterology    MT UPPER GI ENDOSCOPY,BIOPSY N/A 2/8/2023    Procedure: GASTROSCOPY, WITH BIOPSY;  Surgeon: Jamarcus Davalos M.D.;  Location: SURGERY SAME DAY [facility];  Service: Gastroenterology    MT UPPER GI ENDOSCOPY,DIAGNOSIS N/A 1/16/2023    Procedure: GASTROSCOPY;  Surgeon: Jamarcus Davalos M.D.;  Location: SURGERY SAME DAY [facility];  Service: Gastroenterology    MT UPPER GI ENDOSCOPY,W/DILAT,GASTRIC OUT N/A 1/16/2023    Procedure: GASTROSCOPY, WITH BALLOON DILATION;  Surgeon: Jamarcus Dvaalos M.D.;  Location: SURGERY SAME DAY [facility];  Service: Gastroenterology    MT UPPER GI ENDOSCOPY,BIOPSY N/A 1/16/2023     Procedure: GASTROSCOPY, WITH BIOPSY;  Surgeon: Jamarcus Davalos M.D.;  Location: SURGERY SAME DAY [facility];  Service: Gastroenterology    TN UPPER GI ENDOSCOPY,DIAGNOSIS N/A 10/24/2022    Procedure: GASTROSCOPY;  Surgeon: Jamarcus Davalos M.D.;  Location: SURGERY SAME DAY [facility];  Service: Gastroenterology    BILIARY DILATATION N/A 10/24/2022    Procedure: DILATION, STRICTURE, BILE DUCT;  Surgeon: Jamarcus Davalos M.D.;  Location: SURGERY SAME DAY [facility];  Service: Gastroenterology    TN CYSTOSCOPY,INSERT URETERAL STENT Right 12/23/2021    Procedure: CYSTOSCOPY, WITH URETERAL STENT EXCHANGE;  Surgeon: Jonathan Turcios M.D.;  Location: [facility];  Service: Urology    TN CYSTO/URETERO/PYELOSCOPY, DX Right 12/23/2021    Procedure: URETEROSCOPY;  Surgeon: Jonathan Turcios M.D.;  Location: [facility];  Service: Urology    TN CYSTO/URETERO/PYELOSCOPY, DX Right 12/8/2021    Procedure: URETEROSCOPY;  Surgeon: Luc Hook M.D.;  Location: [facility];  Service: Urology    CYSTO STENT PLACEMNT PRE SURG Right 12/8/2021    Procedure: CYSTOSCOPY, WITH URETERAL STENT INSERTION;  Surgeon: Luc Hook M.D.;  Location: [facility];  Service: Urology    ILEOSTOMY  1/20/2021    Procedure: ILEOSTOMY- COMPLEX REVISION,;  Surgeon: Kevin Cruz M.D.;  Location: [facility];  Service: General    LYSIS ADHESIONS GENERAL  1/20/2021    Procedure: LYSIS, ADHESIONS;  Surgeon: Kevin Cruz M.D.;  Location: [facility];  Service: General    GASTROSCOPY N/A 5/22/2019    Procedure: GASTROSCOPY - WITH DILATION;  Surgeon: Dionicio Cardona M.D.;  Location: [facility];  Service: Gastroenterology    GASTROSCOPY  1/6/2019    Procedure: GASTROSCOPY- WITH DILATION;  Surgeon: Daniel Daniels M.D.;  Location: [facility];  Service: Gastroenterology    ILEOSTOMY  12/29/2018    Procedure: ILEOSTOMY- REVISION;  Surgeon: Kevin Cruz M.D.;  Location: SURGERY [facility];  Service: General    SIGMOIDOSCOPY FLEX  12/29/2018    Procedure: SIGMOIDOSCOPY FLEX;  Surgeon: Kevin Cruz M.D.;  Location: SURGERY [facility];  Service: General    EXPLORATORY LAPAROTOMY  12/29/2018    Procedure: EXPLORATORY LAPAROTOMY, lysis of adhesions;  Surgeon: Kevin Cruz M.D.;  Location: SURGERY [facility];  Service: General    ILEOSTOMY  5/14/2014    Performed by Kevin Cruz M.D. at SURGERY [facility]    MAMMOPLASTY REDUCTION  7/17/2013    Performed by Janey Burt M.D. at SURGERY [facility] ORS    HARDWARE REMOVAL NEURO  7/16/2012    Performed by KEELEY KIM at SURGERY [facility]S    GASTROSCOPY  10/4/2011    Performed by TYSON MARTINEZ at SURGERY SAME DAY [facility]S    COLONOSCOPY  10/4/2011    Performed by TYSON MARTINEZ at SURGERY SAME DAY [facility]S    DILATION AND CURETTAGE  9/24/2010    [facility] GAURAV ALY [facility]    ILEOSTOMY  11/11/2009    Performed by KEVIN CRUZ at SURGERY [facility]S    GASTROSCOPY WITH BIOPSY  11/22/08    Performed by NEGRITA HUYNH at SURGERY [facility] ORS    ABDOMINAL EXPLORATION      CERVICAL DISK AND FUSION ANTERIOR      COLON RESECTION      FOOT SURGERY      HAND SURGERY      LUMBAR FUSION ANTERIOR      LUMPECTOMY      OTHER ABDOMINAL SURGERY      surgery for chrons disease    FL BREAST REDUCTION         SOCIAL HISTORY:  Social History     Socioeconomic History    Marital status:      Spouse name: Not on file    Number of children: Not on file    Years of education: Not on file    Highest education level: Associate degree: academic program   Occupational History    Not on file   Tobacco Use    Smoking status: Never    Smokeless tobacco: Never    Tobacco comments:     second hand smoke parents - smoked for only 1 year many years ago   Vaping Use    Vaping status: Every Day    Substances: THC   Substance and Sexual Activity    Alcohol use: Not Currently      Alcohol/week: 0.6 oz     Types: 1 Shots of liquor per week     Comment: gin and half a lime; tonic water    Drug use: Yes     Types: Marijuana, Inhaled     Comment: medical marijuana through bong/vape    Sexual activity: Not on file     Comment:    Other Topics Concern    Not on file   Social History Narrative    Not on file     Social Determinants of Health     Financial Resource Strain: Unknown (1/15/2023)    Overall Financial Resource Strain (CARDIA)     Difficulty of Paying Living Expenses: Patient declined   Food Insecurity: Unknown (1/15/2023)    Hunger Vital Sign     Worried About Running Out of Food in the Last Year: Patient declined     Ran Out of Food in the Last Year: Patient declined   Transportation Needs: Unknown (1/15/2023)    PRAPARE - Transportation     Lack of Transportation (Medical): Patient declined     Lack of Transportation (Non-Medical): Patient declined   Physical Activity: Sufficiently Active (1/15/2023)    Exercise Vital Sign     Days of Exercise per Week: 7 days     Minutes of Exercise per Session: 60 min   Stress: No Stress Concern Present (11/4/2021)    [employer] [employer] - Occupational Stress Questionnaire     Feeling of Stress : Not at all   Social Connections: Socially Integrated (1/15/2023)    Social Connection and Isolation Panel [NHANES]     Frequency of Communication with Friends and Family: Three times a week     Frequency of Social Gatherings with Friends and Family: Once a week     Attends Baptism Services: More than 4 times per year     Active Member of Clubs or Organizations: Yes     Attends Club or Organization Meetings: More than 4 times per year     Marital Status:    Intimate Partner Violence: Not on file   Housing Stability: Unknown (1/15/2023)    Housing Stability Vital Sign     Unable to Pay for Housing in the Last Year: Patient refused     Number of Places Lived in the Last Year: 1     Unstable Housing in the Last Year: No        FAMILY HISTORY:  Family History   Problem Relation Age of Onset    Lung Disease Mother         copd    Diabetes Father     Heart Disease Father     Diabetes Sister     Cancer Maternal Aunt 40        breast       MEDICATIONS:  Current Outpatient Medications   Medication Sig    potassium chloride SA (KDUR) 20 MEQ Tab CR Take 1 Tablet by mouth 2 times a day.    diazePAM (VALIUM) 5 MG Tab TAKE 1 TABLET BY MOUTH EVERY 6 HOURS AS NEEDED FOR SPASMS    hydroxychloroquine (PLAQUENIL) 200 MG Tab Take 1 Tablet by mouth every day.    furosemide (LASIX) 20 MG Tab Take 1 Tablet by mouth every day.    spironolactone (ALDACTONE) 25 MG Tab Take 1 Tablet by mouth every day.    ondansetron (ZOFRAN ODT) 4 MG TABLET DISPERSIBLE Take 1 Tablet by mouth every 6 hours as needed for Nausea/Vomiting.    metoprolol tartrate (LOPRESSOR) 50 MG Tab Take 1 Tablet by mouth 2 times a day.    oxyCODONE immediate release (ROXICODONE) 10 MG immediate release tablet Take 1 Tablet by mouth every 6 hours as needed for Moderate Pain for up to 30 days. (Patient taking differently: Take 10 mg by mouth every 6 hours as needed for Moderate Pain. 3 times a day)    cetirizine (ZYRTEC) 1 MG/ML Solution oral solution Take 10 mL by mouth every day.    NON SPECIFIED [facility] Sacroiliac belt.    Azelastine (ASTELIN) 137 MCG/SPRAY Solution Administer 1 Spray into each nostril at bedtime.    fluticasone (FLONASE) 50 MCG/ACT nasal spray Administer 1 Spray into each nostril at bedtime.    nystatin (MYCOSTATIN) 588594 UNIT/ML Suspension Take 5 mL by mouth 4 times a day.    prochlorperazine (COMPAZINE) 10 MG Tab Take 1 Tablet by mouth every 6 hours as needed for Nausea/Vomiting.    traZODone (DESYREL) 50 MG Tab Take 1 Tablet by mouth every evening.    granisetron (KYTRIL) 1 MG Tab Take 1 Tablet by mouth every 12 hours as needed for Nausea/Vomiting.    levalbuterol (XOPENEX HFA) 45 MCG/ACT inhaler Inhale 2 Puffs every four hours as needed for Shortness  "of Breath (As needed for shortness of breath, cough, wheezing.).    Ascorbic Acid (VITAMIN C PO) Take 1 Tablet by mouth 2 times a day.    Biotin w/ Vitamins C & E (HAIR SKIN & NAILS GUMMIES PO) Take 1 Tablet by mouth 2 times a day.    naratriptan (AMERGE) 2.5 MG tablet Take 1 Tablet by mouth as needed for Migraine.    cetirizine (ZYRTEC) 1 MG/ML Solution oral solution Take 10 mL by mouth every day.    oxyCODONE immediate release (ROXICODONE) 10 MG immediate release tablet Take 1 Tablet by mouth every 6 hours as needed for Moderate Pain for up to 30 days.    lidocaine (ASPERFLEX) 4 % Patch Place 1 Patch on the skin every 24 hours. (Patient not taking: Reported on 7/18/2024)       ALLERGIES:   Allergies   Allergen Reactions    Demerol Hcl [Meperidine] Shortness of Breath and Palpitations    Methotrexate Hives, Shortness of Breath and Rash          Morphine Shortness of Breath and Palpitations    Promethazine Shortness of Breath and Rash          Remicade [Infliximab] Hives, Shortness of Breath and Rash          Sudafed [Pseudoephedrine] Shortness of Breath, Vomiting and Palpitations    Azathioprine Sodium Hives and Shortness of Breath     10/21/2022 - patient unable to verify allergy/reaction  Historical reaction listed: Hives, Shortness of Breath    Carafate [Sucralfate] Vomiting and Nausea     + Mouth Sores    Other Drug Unspecified     \"STEROIDS\" - REACTION NOT SPECIFIED    Sulfa Drugs Rash          Sulfasalazine Rash          Gabapentin Cough, Hives, Itching, Nausea, Palpitations, Photosensitivity, Rash, Runny Nose, Swelling, Unspecified and Vomiting    Prednisone      Other Reaction(s): Reaction:GI system trouble    Amitriptyline Unspecified     Nightmares      Ativan [Lorazepam] Unspecified     Nightmares    Betamethasone Unspecified     10/21/2022 - patient unable to verify allergy/reaction  No historical reaction listed    Fentanyl Unspecified     \"Patches didn't work\" and skin broke down    Lyrica " "[Pregabalin] Nausea          Methadone Unspecified     \"Didn't work\"    Potassium Unspecified     Notes: In IV only  REACTION NOT SPECIFIED    Tizanidine Unspecified     10/21/2022 - patient unable to verify allergy/reaction  No historical reaction listed       IMMUNIZATIONS:  Immunization History   Administered Date(s) Administered    Covid-19 Mrna (Spikevax) Moderna 12+ Years 10/30/2023    INFLUENZA TIV (IM) 09/17/2007, 10/06/2012, 11/16/2013, 09/27/2015    Influenza (IM) Preservative Free - HISTORICAL DATA 12/17/2010, 09/27/2015    Influenza Seasonal Injectable - Historical Data 10/06/2012, 11/16/2013    Influenza Vac Subunit Quad Inj (Pf) 10/10/2017    Influenza Vaccine Adult HD 11/12/2018, 09/23/2020, 10/11/2021, 10/07/2022, 10/05/2023    Influenza Vaccine Quad Inj (Pf) 10/24/2014, 10/04/2017    Influenza Vaccine Quad Inj (Preserved) 09/21/2016, 10/11/2019    Influenza Vaccine-Flucelvax - HISTORICAL DATA 01/06/2014    PFIZER BIVALENT SARS-COV-2 VACCINE (12+) 10/07/2022    PFIZER DELATORRE CAP SARS-COV-2 VACCINATION (12+) 05/12/2022    PFIZER PURPLE CAP SARS-COV-2 VACCINATION (12+) 03/17/2021, 04/08/2021, 10/29/2021    Pneumococcal Conjugate Vaccine (Prevnar/PCV-13) 11/12/2018    Pneumococcal Vaccine (UF) - HISTORICAL DATA 10/17/2007    Pneumococcal polysaccharide vaccine (PPSV-23) 10/06/2012    Tdap Vaccine 12/04/2012, 11/16/2013    ZZZInfluenza Vaccine Pediatric Preserv Free 09/29/2009    Zoster Vaccine Live (ZVL) (Zostavax) - HISTORICAL DATA 11/01/2014            Objective     Vital Signs: /62 (BP Location: Left arm, Patient Position: Sitting, BP Cuff Size: Adult)   Pulse 73   Temp 36.7 °C (98 °F) (Temporal)   Ht 1.829 m (6')   Wt 59.4 kg (131 lb)   SpO2 95% Body mass index is 17.77 kg/m².    General: Appears well and comfortable  Eyes: No scleral or conjunctival lesions  ENT: No apparent oral, nasal, or ear lesions  Head/Neck: No apparent scalp or neck lesions  Cardiovascular: Regular rate and " rhythm  Respiratory: Breathing quiet and unlabored  Gastrointestinal: No apparent organomegaly or abdominal masses  Integumentary: Trace malar rash on the face; erythematous maculopapular lesions on lower extremities more than upper extremities  Musculoskeletal: Poorly localized mild to moderate tenderness of hands, wrists, elbows, shoulders, knees, ankles, feet, and various muscle groups of upper/lower extremities, neck/upper back, mid/lower back, and chest wall; overall range of motion relatively limited by pain; no overt signs of synovitis at any of the joints examined  Neurologic: No focal sensory or motor deficits  Psychiatric: Mood and affect appropriate      LABORATORY RESULTS REVIEWED AND INTERPRETED BY ME:  Lab Results   Component Value Date/Time    CREACTPROT 1.55 (H) 07/18/2024 03:50 AM    SEDRATEWES 20 07/18/2024 03:50 AM    URICACID 7.3 12/06/2022 04:51 PM     Lab Results   Component Value Date/Time    N2PWLCIVQPX 157.7 11/17/2023 10:15 AM    G9YCMHPACMZ 48.0 11/17/2023 10:15 AM     Lab Results   Component Value Date/Time    RHEUMFACTN 10 10/24/2023 04:42 PM    CCPANTIBODY 8 07/28/2022 04:31 PM    [id number] Negative 07/28/2022 04:31 PM     Lab Results   Component Value Date/Time    ANTINUCAB Detected (A) 10/24/2023 04:42 PM    ANADIRECT Negative 10/20/2010 11:00 AM    ANAPATTERN <1:40 01/07/2009 06:15 PM     Lab Results   Component Value Date/Time    ANTIDNADS SEE BELOW 10/24/2023 04:42 PM    SMITHAB 1 10/24/2023 04:42 PM    RNPAB 4 10/24/2023 04:42 PM    INZHPDK20 1 10/24/2023 04:42 PM    SSA60 1 10/24/2023 04:42 PM    SSA52 0 10/24/2023 04:42 PM    ANTISSBSJ 0 10/24/2023 04:42 PM    JO1AB 1 10/24/2023 04:42 PM     Lab Results   Component Value Date/Time    IGACARDIOLI 24 (H) 10/26/2021 07:49 AM    IGMCARDIOLI <10 10/26/2021 07:49 AM    IGGCARDIOLI 19 (H) 10/26/2021 07:49 AM     Lab Results   Component Value Date/Time     (H) 11/01/2021 07:53 AM    IGG 1169 11/01/2021 07:53 AM     Lab Results    Component Value Date/Time    ANTIMITOCHO 3.5 07/22/2016 12:41 PM    FACTIN 11 07/22/2016 12:41 PM     Lab Results   Component Value Date/Time    MICROSOMALA <9.0 07/28/2022 04:31 PM    ANTITHYROGL <0.9 07/28/2022 04:31 PM    TSH 0.922 10/20/2010 11:00 AM    TSHULTRASEN 0.360 (L) 10/17/2023 08:00 AM    FREEDIR 1.28 10/20/2010 11:00 AM    FREET4 1.54 10/17/2023 03:12 PM     Lab Results   Component Value Date/Time    CPKTOTAL 41 05/05/2022 09:43 AM    ALDOLASE 4.2 01/21/2008 03:43 AM    MYOGLOBIN 73 07/17/2008 03:48 PM     Lab Results   Component Value Date/Time    25HYDROXY 40 07/14/2022 11:23 AM    PTHINTACT 52.3 12/06/2022 04:51 PM     Lab Results   Component Value Date/Time    FERRITIN 144.0 05/07/2020 01:34 PM    IRON 144 05/07/2020 01:34 PM    FOLATE 16.4 04/28/2021 07:59 AM    PROTHROMBTM 14.9 (H) 10/24/2022 01:19 AM    INR 1.19 (H) 10/24/2022 01:19 AM     Lab Results   Component Value Date/Time    WBC 9.0 07/18/2024 03:50 AM    WBC 7.9 02/10/2010 04:04 PM    RBC 3.80 (L) 07/18/2024 03:50 AM    RBC 4.86 02/10/2010 04:04 PM    HEMOGLOBIN 12.0 07/18/2024 03:50 AM    HEMATOCRIT 36.2 (L) 07/18/2024 03:50 AM    MCV 95.3 07/18/2024 03:50 AM    MCV 86 02/10/2010 04:04 PM    MCH 31.6 07/18/2024 03:50 AM    MCH 27.8 02/10/2010 04:04 PM    MCHC 33.1 07/18/2024 03:50 AM    RDW 47.6 07/18/2024 03:50 AM    RDW 14.6 02/10/2010 04:04 PM    PLATELETCT 209 07/18/2024 03:50 AM    MPV 10.1 07/18/2024 03:50 AM    NEUTS 6.66 07/18/2024 03:50 AM    NEUTS 4.8 02/10/2010 04:04 PM    POLYS 53 01/28/2021 02:40 PM    LYMPHOCYTES 15.80 (L) 07/18/2024 03:50 AM    MONOCYTES 7.60 07/18/2024 03:50 AM    EOSINOPHILS 1.40 07/18/2024 03:50 AM    EOSINOPHILS 34 01/28/2021 02:40 PM    BASOPHILS 0.60 07/18/2024 03:50 AM    HYPOCHROMIA 1+ 03/15/2014 10:02 AM     Lab Results   Component Value Date/Time    ASTSGOT 50 (H) 07/18/2024 03:50 AM    ALTSGPT 38 07/18/2024 03:50 AM    ALKPHOSPHAT 95 07/18/2024 03:50 AM    TBILIRUBIN 0.4 07/18/2024 03:50 AM     TOTPROTEIN 6.5 07/18/2024 03:50 AM    TOTPROTEIN 7.1 11/01/2021 07:53 AM    ALBUMIN 3.6 07/18/2024 03:50 AM    ALBUMIN 3.83 11/01/2021 07:53 AM     Lab Results   Component Value Date/Time    SODIUM 136 07/18/2024 03:50 AM    POTASSIUM 3.9 07/18/2024 03:50 AM    CHLORIDE 101 07/18/2024 03:50 AM    CO2 23 07/18/2024 03:50 AM    GLUCOSE 89 07/18/2024 03:50 AM    BUN 27 (H) 07/18/2024 03:50 AM    CREATININE 1.09 07/18/2024 03:50 AM    CREATININE 0.89 02/10/2010 04:04 PM    BUNCREATRAT 17 02/10/2010 04:04 PM    GLOMRATE >59 02/10/2010 04:04 PM    CALCIUM 9.8 07/18/2024 03:50 AM    MAGNESIUM 1.8 02/29/2024 01:35 PM     Lab Results   Component Value Date/Time    TOTALVOLUME 16 08/19/2023 08:00 AM    TOTPROTUR 6 10/14/2021 05:00 AM    TTKLJLGY24 30 (L) 10/14/2021 05:00 AM    CREATININEU 1022 08/19/2023 08:00 AM     Lab Results   Component Value Date/Time    COLORURINE Yellow 07/02/2024 08:00 AM    SPECGRAVITY >=1.030 07/02/2024 08:00 AM    PHURINE 6.0 07/02/2024 08:00 AM    GLUCOSEUR Negative 07/02/2024 08:00 AM    KETONES Trace (A) 07/02/2024 08:00 AM    PROTEINURIN 30 (A) 07/02/2024 08:00 AM     Lab Results   Component Value Date/Time    FLTYPE Peritoneal 01/28/2021 02:40 PM     Lab Results   Component Value Date/Time    HEPBSAG Negative 07/22/2016 12:41 PM    HEPBCORIGM Negative 07/22/2016 12:41 PM    HEPCAB Negative 07/22/2016 12:41 PM     Lab Results   Component Value Date/Time    CHOLSTRLTOT 194 05/23/2022 02:36 AM    LDL 90 05/23/2022 02:36 AM    HDL 81 05/23/2022 02:36 AM    TRIGLYCERIDE 113 05/23/2022 02:36 AM    HBA1C 5.3 11/17/2023 10:34 AM       RADIOLOGY RESULTS REVIEWED AND INTERPRETED BY ME:    Results for orders placed in visit on 07/27/22    DX-JOINT SURVEY-HANDS SINGLE VIEW    Impression  1. No radiographic evidence of inflammatory arthropathy.      All relevant laboratory and imaging results reported on this note were reviewed and interpreted by me.         Assessment & Plan     Jana Overton is  a 69 y.o. female with history and physical as noted above whose presentation merits the following clinical impressions and recommendations:    1. Drug-induced lupus erythematosus  Clinically and serologically without significant evidence of disease activity on the current regimen of hydroxychloroquine, so no need for escalation of treatment. However, given the potential for smoldering disease activity with limited clinical manifestations, need to occasionally reassess serologic markers of disease activity and risk stratification to gauge overall trajectory in response to ongoing treatment.  - CRP QUANTITIVE (NON-CARDIAC); Future  - Sed Rate; Future  - CBC WITH DIFFERENTIAL; Future  - Comp Metabolic Panel; Future    2. Fibromyalgia syndrome  Active problem that is presumably the most significant etiology of her overall musculoskeletal pain burden which can be managed supportively.   - Recommend trial of Cymbalta as Neurontin and Lyrica were previously ineffective  - Routine follow up with primary care and pain management    3. Primary osteoarthritis of both hands  Active problem that is presumably an important contributor to her overall joint pain which can be managed supportively.  - Tylenol Arthritis PRN as NSAIDs need to be avoided in the setting of her renal insufficiency  - Consider intra-articular steroid injection if that becomes necessary    4. Degenerative joint disease of cervical and lumbar spine  Active problem that is presumably the etiology of her chronic back pain which can be managed supportively.  - Topical analgesics and Tylenol Arthritis as NSAIDs need to be avoided in the setting of her renal insufficiency  - Consider referral to pain management if that becomes necessary    5. Crohn's disease of small intestine with complication (HCC)  Status post ileostomy with stoma and appears to be stable with no ongoing immunosuppressive therapy as it is thought that her past use of Remicade and/or  hydralazine likely precipitated her drug-induced lupus.  - Routine follow-up with gastroenterology    6. Long-term use of hydroxychloroquine  Minimal to no risk of retinal toxicity given the current optimal dose of hydroxychloroquine prescribed (less than 5 mg/kg of body weight) per rheumatology and ophthalmology guidelines. However, ophthalmologic evaluation is still recommended with the frequency of routine follow-up eye exams determined by the ophthalmologist, typically annually or every other year.  - Routine ophthalmology evaluation as recommended      The above assessment and plan were discussed with the patient who acknowledged understanding of the plan.    FOLLOW-UP: Return in about 6 months (around 2/20/2025) for Short.         Thank you for the opportunity to participate in the care of Jana Overton.    Petar Lewis MD, MS, FACR  Rheumatologist, [facility], [facility]   of Clinical Medicine, Department of Internal Medicine  [facility] [employer]

## 2024-08-21 ENCOUNTER — APPOINTMENT (OUTPATIENT)
Dept: MEDICAL GROUP | Facility: LAB | Age: 69
End: 2024-08-21
Payer: MEDICARE

## 2024-08-26 ENCOUNTER — APPOINTMENT (RX ONLY)
Dept: URBAN - METROPOLITAN AREA CLINIC 36 | Facility: CLINIC | Age: 69
Setting detail: DERMATOLOGY
End: 2024-08-26

## 2024-08-26 PROBLEM — C44.321 SQUAMOUS CELL CARCINOMA OF SKIN OF NOSE: Status: ACTIVE | Noted: 2024-08-26

## 2024-08-26 PROCEDURE — 14060 TIS TRNFR E/N/E/L 10 SQ CM/<: CPT

## 2024-08-26 PROCEDURE — 17311 MOHS 1 STAGE H/N/HF/G: CPT

## 2024-08-26 PROCEDURE — ? MOHS SURGERY

## 2024-08-26 NOTE — PROCEDURE: MOHS SURGERY
Mohs Case Number: [id number]
Previous Accession (Optional): [id number]
Biopsy Photograph Reviewed: Yes
Referring Physician (Optional): hay
Consent Type: Consent 1 (Standard)
Eye Shield Used: No
Surgeon Performing Repair (Optional): Ivanna
Initial Size Of Lesion: 0.5
Number Of Stages: 1
Primary Defect Length In Cm (Final Defect Size - Required For Flaps/Grafts): 0.7
Primary Defect Depth In Cm (Optional But Required For Some Insurers): 0
Repair Type: Flap
Which Instrument Did You Use For Dermabrasion?: Wire Brush
Which Eyelid Repair Cpt Are You Using?: 19590
Oculoplastic Surgeon (A): Nicholas
Oculoplastic Surgeon Procedure Text (A): After obtaining clear surgical margins the patient was sent to oculoplastics for surgical repair.  The patient understands they will receive post-surgical care and follow-up from the referring physician's office.
Otolaryngologist Procedure Text (A): After obtaining clear surgical margins the patient was sent to otolaryngology for surgical repair.  The patient understands they will receive post-surgical care and follow-up from the referring physician's office.
Plastic Surgeon Procedure Text (A): After obtaining clear surgical margins the patient was sent to plastics for surgical repair.  The patient understands they will receive post-surgical care and follow-up from the referring physician's office.
Mid-Level (A): did
Mid-Level Procedure Text (A): After obtaining clear surgical margins the patient was sent to a mid-level provider for surgical repair.  The patient understands they will receive post-surgical care and follow-up from the mid-level provider.
Provider Procedure Text (A): After obtaining clear surgical margins the defect was repaired by another provider.
Asc Procedure Text (A): After obtaining clear surgical margins the patient was sent to an ASC for surgical repair.  The patient understands they will receive post-surgical care and follow-up from the ASC physician.
Suturegard Retention Suture: 2-0 Nylon
Retention Suture Bite Size: 3 mm
Length To Time In Minutes Device Was In Place: 10
Undermining Type: Entire Wound
Debridement Text: The wound edges were debrided prior to proceeding with the closure to facilitate wound healing.
Helical Rim Text: The closure involved the helical rim.
Vermilion Border Text: The closure involved the vermilion border.
Nostril Rim Text: The closure involved the nostril rim.
Retention Suture Text: Retention sutures were placed to support the closure and prevent dehiscence.
Flap Type: Island Pedicle Flap
Secondary Defect Length In Cm (Required For Flaps): 3.1
Secondary Defect Width In Cm (Required For Flaps): 1.5
Complicating Clinical Features Indication Override: Crohn’s disease
Area H Indication Text: Tumors in this location are included in Area H (eyelids, eyebrows, nose, lips, chin, ear, pre-auricular, post-auricular, temple, genitalia, hands, feet, ankles and areola).  Tissue conservation is critical in these anatomic locations.
Area M Indication Text: Tumors in this location are included in Area M (cheek, forehead, scalp, neck, jawline and pretibial skin).  Mohs surgery is indicated for tumors in these anatomic locations.
Area L Indication Text: Tumors in this location are included in Area L (trunk and extremities).  Mohs surgery is indicated for larger tumors, or tumors with aggressive histologic features, in these anatomic locations.
Depth Of Tumor Invasion (For Histology): tumor not visualized (deep and peripheral margins are clear of tumor)
Perineural Invasion (For Histology - Be Specific If Possible): absent
Special Stains Stage 1 - Results: Base On Clearance Noted Above
Stage 2: Additional Anesthesia Type: 1% lidocaine with 1:100,000 epinephrine and 408mcg clindamycin/ml and a 1:10 solution of 8.4% sodium bicarbonate
Stage 4: Additional Anesthesia Type: 1% lidocaine with epinephrine
Staging Info: By selecting yes to the question above you will include information on AJCC 8 tumor staging in your Mohs note. Information on tumor staging will be automatically added for SCCs on the head and neck. AJCC 8 includes tumor size, tumor depth, perineural involvement and bone invasion.
Tumor Depth: Less than 6mm from granular layer and no invasion beyond the subcutaneous fat
Was The Patient On Physician Recommended Anticoagulation Therapy?: Please Select the Appropriate Response
Medical Necessity Statement: Based on my medical judgement, Mohs surgery is the most appropriate treatment for this cancer compared to other treatments.
Alternatives Discussed Intro (Do Not Add Period): I discussed alternative treatments to Mohs surgery and specifically discussed the risks and benefits of
Consent 1/Introductory Paragraph: The rationale for Mohs was explained to the patient and consent was obtained. The risks, benefits and alternatives to therapy were discussed in detail. Specifically, the risks of infection, scarring, bleeding, prolonged wound healing, incomplete removal, allergy to anesthesia, nerve injury and recurrence were addressed. Prior to the procedure, the treatment site was clearly identified and confirmed by the patient. All components of Universal Protocol/PAUSE Rule completed.
Consent 2/Introductory Paragraph: Mohs surgery was explained to the patient and consent was obtained. The risks, benefits and alternatives to therapy were discussed in detail. Specifically, the risks of infection, scarring, bleeding, prolonged wound healing, incomplete removal, allergy to anesthesia, nerve injury and recurrence were addressed. Prior to the procedure, the treatment site was clearly identified and confirmed by the patient. All components of Universal Protocol/PAUSE Rule completed.
Consent 3/Introductory Paragraph: I gave the patient a chance to ask questions they had about the procedure.  Following this I explained the Mohs procedure and consent was obtained. The risks, benefits and alternatives to therapy were discussed in detail. Specifically, the risks of infection, scarring, bleeding, prolonged wound healing, incomplete removal, allergy to anesthesia, nerve injury and recurrence were addressed. Prior to the procedure, the treatment site was clearly identified and confirmed by the patient. All components of Universal Protocol/PAUSE Rule completed.
Consent (Temporal Branch)/Introductory Paragraph: The rationale for Mohs was explained to the patient and consent was obtained. The risks, benefits and alternatives to therapy were discussed in detail. Specifically, the risks of damage to the temporal branch of the facial nerve, infection, scarring, bleeding, prolonged wound healing, incomplete removal, allergy to anesthesia, and recurrence were addressed. Prior to the procedure, the treatment site was clearly identified and confirmed by the patient. All components of Universal Protocol/PAUSE Rule completed.
Consent (Marginal Mandibular)/Introductory Paragraph: The rationale for Mohs was explained to the patient and consent was obtained. The risks, benefits and alternatives to therapy were discussed in detail. Specifically, the risks of damage to the marginal mandibular branch of the facial nerve, infection, scarring, bleeding, prolonged wound healing, incomplete removal, allergy to anesthesia, and recurrence were addressed. Prior to the procedure, the treatment site was clearly identified and confirmed by the patient. All components of Universal Protocol/PAUSE Rule completed.
Consent (Spinal Accessory)/Introductory Paragraph: The rationale for Mohs was explained to the patient and consent was obtained. The risks, benefits and alternatives to therapy were discussed in detail. Specifically, the risks of damage to the spinal accessory nerve, infection, scarring, bleeding, prolonged wound healing, incomplete removal, allergy to anesthesia, and recurrence were addressed. Prior to the procedure, the treatment site was clearly identified and confirmed by the patient. All components of Universal Protocol/PAUSE Rule completed.
Consent (Near Eyelid Margin)/Introductory Paragraph: The rationale for Mohs was explained to the patient and consent was obtained. The risks, benefits and alternatives to therapy were discussed in detail. Specifically, the risks of ectropion or eyelid deformity, infection, scarring, bleeding, prolonged wound healing, incomplete removal, allergy to anesthesia, nerve injury and recurrence were addressed. Prior to the procedure, the treatment site was clearly identified and confirmed by the patient. All components of Universal Protocol/PAUSE Rule completed.
Consent (Ear)/Introductory Paragraph: The rationale for Mohs was explained to the patient and consent was obtained. The risks, benefits and alternatives to therapy were discussed in detail. Specifically, the risks of ear deformity, infection, scarring, bleeding, prolonged wound healing, incomplete removal, allergy to anesthesia, nerve injury and recurrence were addressed. Prior to the procedure, the treatment site was clearly identified and confirmed by the patient. All components of Universal Protocol/PAUSE Rule completed.
Consent (Nose)/Introductory Paragraph: The rationale for Mohs was explained to the patient and consent was obtained. The risks, benefits and alternatives to therapy were discussed in detail. Specifically, the risks of nasal deformity, changes in the flow of air through the nose, infection, scarring, bleeding, prolonged wound healing, incomplete removal, allergy to anesthesia, nerve injury and recurrence were addressed. Prior to the procedure, the treatment site was clearly identified and confirmed by the patient. All components of Universal Protocol/PAUSE Rule completed.
Consent (Lip)/Introductory Paragraph: The rationale for Mohs was explained to the patient and consent was obtained. The risks, benefits and alternatives to therapy were discussed in detail. Specifically, the risks of lip deformity, changes in the oral aperture, infection, scarring, bleeding, prolonged wound healing, incomplete removal, allergy to anesthesia, nerve injury and recurrence were addressed. Prior to the procedure, the treatment site was clearly identified and confirmed by the patient. All components of Universal Protocol/PAUSE Rule completed.
Consent (Scalp)/Introductory Paragraph: The rationale for Mohs was explained to the patient and consent was obtained. The risks, benefits and alternatives to therapy were discussed in detail. Specifically, the risks of changes in hair growth pattern secondary to repair, infection, scarring, bleeding, prolonged wound healing, incomplete removal, allergy to anesthesia, nerve injury and recurrence were addressed. Prior to the procedure, the treatment site was clearly identified and confirmed by the patient. All components of Universal Protocol/PAUSE Rule completed.
Detail Level: Detailed
Postop Diagnosis: same
Anesthesia Type: 1% lidocaine with 1:100,000 epinephrine and a 1:10 solution of 8.4% sodium bicarbonate
Anesthesia Volume In Cc: 6
Hemostasis: Electrocautery
Estimated Blood Loss (Cc): less than 5 cc
Repair Anesthesia Method: local infiltration
Brow Lift Text: A midfrontal incision was made medially to the defect to allow access to the tissues just superior to the left eyebrow. Following careful dissection inferiorly in a supraperiosteal plane to the level of the left eyebrow, several 3-0 monocryl sutures were used to resuspend the eyebrow orbicularis oculi muscular unit to the superior frontal bone periosteum. This resulted in an appropriate reapproximation of static eyebrow symmetry and correction of the left brow ptosis.
Deep Sutures: 5-0 Polysorb
Epidermal Sutures: 5-0 Prolene
Epidermal Closure: running cuticular
Suturegard Intro: Intraoperative tissue expansion was performed, utilizing the SUTUREGARD device, in order to reduce wound tension.
Suturegard Body: The suture ends were repeatedly re-tightened and re-clamped to achieve the desired tissue expansion.
Hemigard Intro: Due to skin fragility and wound tension, it was decided to use HEMIGARD adhesive retention suture devices to permit a linear closure. The skin was cleaned and dried for a 6cm distance away from the wound. Excessive hair, if present, was removed to allow for adhesion.
Hemigard Postcare Instructions: The HEMIGARD strips are to remain completely dry for at least 5-7 days.
Donor Site Anesthesia Type: same as repair anesthesia
Graft Basting Suture (Optional): 5-0 Fast Absorbing Gut
Graft Donor Site Epidermal Sutures (Optional): 5-0 Ethibond
Epidermal Closure Graft Donor Site (Optional): simple interrupted
Graft Donor Site Bandage (Optional-Leave Blank If You Don't Want In Note): Aquaphor and telefa placed on wound. Pressure dressing applied to donor site
Closure 2 Information: This tab is for additional flaps and grafts, including complex repair and grafts and complex repair and flaps. You can also specify a different location for the additional defect, if the location is the same you do not need to select a new one. We will insert the automated text for the repair you select below just as we do for solitary flaps and grafts. Please note that at this time if you select a location with a different insurance zone you will need to override the ICD10 and CPT if appropriate.
Closure 3 Information: This tab is for additional flaps and grafts above and beyond our usual structured repairs.  Please note if you enter information here it will not currently bill and you will need to add the billing information manually.
Wound Care: Aquaphor
Dressing: dry sterile dressing
Wound Care (No Sutures): Petrolatum
Suture Removal: 7 days
Unna Boot Text: An Unna boot was placed to help immobilize the limb and facilitate more rapid healing.
Home Suture Removal Text: Patient was provided instructions on removing sutures and will remove their sutures at home.  If they have any questions or difficulties they will call the office.
Post-Care Instructions: I reviewed with the patient in detail post-care instructions. Patient is not to engage in any heavy lifting, exercise, or swimming for the next 14 days. Should the patient develop any fevers, chills, bleeding, severe pain patient will contact the office immediately.
Pain Refusal Text: I offered to prescribe pain medication but the patient refused to take this medication.
Mauc Instructions: By selecting yes to the question below the MAUC number will be added into the note.  This will be calculated automatically based on the diagnosis chosen, the size entered, the body zone selected (H,M,L) and the specific indications you chose. You will also have the option to override the Mohs AUC if you disagree with the automatically calculated number and this option is found in the Case Summary tab.
Where Do You Want The Question To Include Opioid Counseling Located?: Case Summary Tab
Eye Protection Verbiage: Before proceeding with the stage, a plastic scleral shield was inserted. The globe was anesthetized with a few drops of 1% lidocaine with 1:100,000 epinephrine. Then, an appropriate sized scleral shield was chosen and coated with lacrilube ointment. The shield was gently inserted and left in place for the duration of each stage. After the stage was completed, the shield was gently removed.
Mohs Method Verbiage: An incision at a 45 degree angle following the standard Mohs approach was done and the specimen was harvested as a microscopic controlled layer.
Surgeon/Pathologist Verbiage (Will Incorporate Name Of Surgeon From Intro If Not Blank): operated in two distinct and integrated capacities as the surgeon and pathologist.
Mohs Histo Method Verbiage: Each section was then chromacoded and processed in the Mohs lab using the Mohs protocol and submitted for frozen section.
Subsequent Stages Histo Method Verbiage: Using a similar technique to that described above, a thin layer of tissue was removed from all areas where tumor was visible on the previous stage.  The tissue was again oriented, mapped, dyed, and processed as above.
Mohs Rapid Report Verbiage: The area of clinically evident tumor was marked with skin marking ink and appropriately hatched.  The initial incision was made following the Mohs approach through the skin.  The specimen was taken to the lab, divided into the necessary number of pieces, chromacoded and processed according to the Mohs protocol.  This was repeated in successive stages until a tumor free defect was achieved.
Intermediate Repair Preamble Text (Leave Blank If You Do Not Want): Undermining was performed with blunt dissection.
Graft Cartilage Fenestration Text: The cartilage was fenestrated with a 2mm punch biopsy to help facilitate graft survival and healing.
Non-Graft Cartilage Fenestration Text: The cartilage was fenestrated with a 2mm punch biopsy to help facilitate healing.
Secondary Intention Text (Leave Blank If You Do Not Want): The defect will heal with secondary intention.
No Repair - Repaired With Adjacent Surgical Defect Text (Leave Blank If You Do Not Want): After obtaining clear surgical margins the defect was repaired concurrently with another surgical defect which was in close approximation.
Unique Flap 1 Name: Myocutaneous Island pedicle Flap
Unique Flap 2 Name: Peng Flap
Unique Flap 3 Name: Mercedes Flap
Unique Flap 4 Name: Banner Flap
Unique Flap 5 Name: tunneled myocutaneous flap
Unique Flap 6 Name: Zuleyka-B?ch flap
Unique Flap 7 Name: Mustarde flap
Unique Flap 8 Name: East to West Flap
Unique Flap 1 Text: A decision was made to reconstruct the defect utilizing a myocutaneous Island pedicle Flap based on the levator labii superioris muscle.  A telfa template was made of the defect.  This telfa template was then used to outline the myocutaneous flap, based along the meilolabial fold.  The donor area for the pedicle flap was then injected with anesthesia.  The flap was excised through the skin and subcutaneous tissue down to the layer of the underlying musculature.  The myocutaneous flap was carefully excised within this deep plane to maintain its blood supply. Based on the muscle. The edges of the donor site were undermined.   The donor site was closed in a primary fashion to the point of transposition.  The pedicle was then transposed into position and sutured.  Once the flap was sutured into place, adequate blood supply was confirmed with blanching and refill.
Unique Flap 2 Text: A decision was made to reconstruct the defect utilizing a Peng Flap (Bilateral Advancement Rotation Flap). Given the location of the defect and the proximity to free margins, this flap was deemed most appropriate.  Using a sterile surgical marker, the appropriate rotation flaps were drawn incorporating the defect and placing the expected incisions within the relaxed skin tension lines where possible.    The area thus outlined was incised deep to adipose tissue with a #15 scalpel blade.  The skin margins were undermined to an appropriate distance in all directions utilizing iris scissors.
Unique Flap 3 Text: The defect edges were debeveled with a #15 scalpel blade.  Given the location of the defect, shape of the defect and the proximity to free margins a Mercedes (double advancement flap) was deemed most appropriate.  Using a sterile surgical marker, the appropriate transposition flaps were drawn incorporating the defect and placing the expected incisions within the relaxed skin tension lines where possible.    The area thus outlined was incised deep to adipose tissue with a #15 scalpel blade.  The skin margins were undermined to an appropriate distance in all directions utilizing iris scissors.  Hemostasis was achieved with electrocautery.  The flaps were then advanced into the defect and anchored with interrupted buried subcutaneous sutures.
Unique Flap 4 Text: The defect edges were debeveled with a #15 scalpel blade.  Given the location of the defect and the proximity to free margins a Banner transposition flap was deemed most appropriate.  Using a sterile surgical marker, an appropriate Banner transposition flap was drawn incorporating the defect.    The area thus outlined was incised deep to adipose tissue with a #15 scalpel blade.  The skin margins were undermined to an appropriate distance in all directions utilizing iris scissors.
Unique Flap 5 Text: A decision was made to reconstruct the defect utilizing a tunneled myocutaneous Island pedicle Flap based on the anterior auricularis muscle.  A telfa template was made of the defect.  This telfa template was then used to outline the myocutaneous flap, based along the preauricular fold.  The donor area for the pedicle flap was then injected with anesthesia.  The flap was excised through the skin and subcutaneous tissue down to the layer of the underlying musculature.  The myocutaneous flap was carefully excised within this deep plane to maintain its blood supply based on the muscle. The edges of the donor site were undermined.   The donor site was closed in a primary fashion to the point of transposition.  The pedicle was then transposed through a tunnel into position and sutured.  Once the flap was sutured into place, adequate blood supply was confirmed with blanching and refill.
Unique Flap 6 Text: A decision was made to reconstruct the defect utilizing an Anti-aging-B?ch Flap (Bilateral helical Advancement Rotation Flap). Given the location of the defect and the proximity to free margins, this flap was deemed most appropriate.  Using a sterile surgical marker, the appropriate flaps were drawn incorporating the defect and placing the expected incisions within the relaxed skin tension lines where possible.  The area thus outlined was incised deep to adipose tissue with a #15 scalpel blade.  The skin margins were undermined to an appropriate distance in all directions utilizing iris scissors. Cartilage was incorporated into the flap arms to maintain helical anatomy.
Unique Flap 7 Text: A decision was made to reconstruct the defect utilizing a Mustarde Flap (Advancement Rotation Flap). Given the location of the defect and the proximity to free margins, this flap was deemed most appropriate.  Using a sterile surgical marker, the appropriate rotation flap was drawn incorporating the defect and placing the expected incisions within the relaxed skin tension lines where possible.    The area thus outlined was incised deep to adipose tissue with a #15 scalpel blade.  The skin margins were undermined to an appropriate distance in all directions utilizing iris scissors. The flap was advanced and rotated under the eyelid with a sling created laterally to keep ectropion minimal.
Unique Flap 8 Text: A decision was made to reconstruct the defect utilizing an East to West Flap (Modified Burows Advancement Flap). Given the location of the defect and the proximity to free margins, this flap was deemed most appropriate.  Using a sterile surgical marker, the appropriate advancement flaps were drawn incorporating the defect and placing the expected incisions within the relaxed skin tension lines where possible.    The area thus outlined was incised deep to adipose tissue with a #15 scalpel blade.  The skin margins were undermined to an appropriate distance in all directions utilizing iris scissors. Minimal alar distortion was created with flap approximation.
Adjacent Tissue Transfer Text: The defect edges were debeveled with a #15 scalpel blade.  Given the location of the defect and the proximity to free margins an adjacent tissue transfer was deemed most appropriate.  Using a sterile surgical marker, an appropriate flap was drawn incorporating the defect and placing the expected incisions within the relaxed skin tension lines where possible.    The area thus outlined was incised deep to adipose tissue with a #15 scalpel blade.  The skin margins were undermined to an appropriate distance in all directions utilizing iris scissors.
Advancement Flap (Single) Text: The defect edges were debeveled with a #15 scalpel blade.  Given the location of the defect and the proximity to free margins a single advancement flap was deemed most appropriate.  Using a sterile surgical marker, an appropriate advancement flap was drawn incorporating the defect and placing the expected incisions within the relaxed skin tension lines where possible.    The area thus outlined was incised deep to adipose tissue with a #15 scalpel blade.  The skin margins were undermined to an appropriate distance in all directions utilizing iris scissors.
Advancement Flap (Double) Text: The defect edges were debeveled with a #15 scalpel blade.  Given the location of the defect and the proximity to free margins a double advancement flap was deemed most appropriate.  Using a sterile surgical marker, the appropriate advancement flaps were drawn incorporating the defect and placing the expected incisions within the relaxed skin tension lines where possible.    The area thus outlined was incised deep to adipose tissue with a #15 scalpel blade.  The skin margins were undermined to an appropriate distance in all directions utilizing iris scissors.
Advancement-Rotation Flap Text: The defect edges were debeveled with a #15 scalpel blade.  Given the location of the defect, shape of the defect and the proximity to free margins an advancement-rotation flap was deemed most appropriate.  Using a sterile surgical marker, an appropriate flap was drawn incorporating the defect and placing the expected incisions within the relaxed skin tension lines where possible. The area thus outlined was incised deep to adipose tissue with a #15 scalpel blade.  The skin margins were undermined to an appropriate distance in all directions utilizing iris scissors.
Alar Island Pedicle Flap Text: The defect edges were debeveled with a #15 scalpel blade.  Given the location of the defect, shape of the defect and the proximity to the alar rim an island pedicle advancement flap was deemed most appropriate.  Using a sterile surgical marker, an appropriate advancement flap was drawn incorporating the defect, outlining the appropriate donor tissue and placing the expected incisions within the nasal ala running parallel to the alar rim. The area thus outlined was incised with a #15 scalpel blade.  The skin margins were undermined minimally to an appropriate distance in all directions around the primary defect and laterally outward around the island pedicle utilizing iris scissors.  There was minimal undermining beneath the pedicle flap.
A-T Advancement Flap Text: The defect edges were debeveled with a #15 scalpel blade.  Given the location of the defect, shape of the defect and the proximity to free margins an A-T advancement flap was deemed most appropriate.  Using a sterile surgical marker, an appropriate advancement flap was drawn incorporating the defect and placing the expected incisions within the relaxed skin tension lines where possible.    The area thus outlined was incised deep to adipose tissue with a #15 scalpel blade.  The skin margins were undermined to an appropriate distance in all directions utilizing iris scissors.
Banner Transposition Flap Text: The defect edges were debeveled with a #15 scalpel blade.  Given the location of the defect and the proximity to free margins a Banner transposition flap was deemed most appropriate.  Using a sterile surgical marker, an appropriate flap drawn around the defect. The area thus outlined was incised deep to adipose tissue with a #15 scalpel blade.  The skin margins were undermined to an appropriate distance in all directions utilizing iris scissors.
Bilateral Helical Rim Advancement Flap Text: The defect edges were debeveled with a #15 blade scalpel.  Given the location of the defect and the proximity to free margins (helical rim) a bilateral helical rim advancement flap was deemed most appropriate.  Using a sterile surgical marker, the appropriate advancement flaps were drawn incorporating the defect and placing the expected incisions between the helical rim and antihelix where possible.  The area thus outlined was incised through and through with a #15 scalpel blade.  With a skin hook and iris scissors, the flaps were gently and sharply undermined and freed up.
Bilateral Rotation Flap Text: The defect edges were debeveled with a #15 scalpel blade. Given the location of the defect, shape of the defect and the proximity to free margins a bilateral rotation flap was deemed most appropriate. Using a sterile surgical marker, an appropriate rotation flap was drawn incorporating the defect and placing the expected incisions within the relaxed skin tension lines where possible. The area thus outlined was incised deep to adipose tissue with a #15 scalpel blade. The skin margins were undermined to an appropriate distance in all directions utilizing iris scissors. Following this, the designed flap was carried over into the primary defect and sutured into place.
Bilobed Flap Text: The defect edges were debeveled with a #15 scalpel blade.  Given the location of the defect and the proximity to free margins a bilobe flap was deemed most appropriate.  Using a sterile surgical marker, an appropriate bilobe flap drawn around the defect.    The area thus outlined was incised deep to adipose tissue with a #15 scalpel blade.  The skin margins were undermined to an appropriate distance in all directions utilizing iris scissors.
Bilobed Transposition Flap Text: The defect edges were debeveled with a #15 scalpel blade.  Given the location of the defect and the proximity to free margins a bilobed transposition flap was deemed most appropriate.  Using a sterile surgical marker, an appropriate bilobe flap drawn around the defect.    The area thus outlined was incised deep to adipose tissue with a #15 scalpel blade.  The skin margins were undermined to an appropriate distance in all directions utilizing iris scissors.
Bi-Rhombic Flap Text: The defect edges were debeveled with a #15 scalpel blade.  Given the location of the defect and the proximity to free margins a bi-rhombic flap was deemed most appropriate.  Using a sterile surgical marker, an appropriate rhombic flap was drawn incorporating the defect. The area thus outlined was incised deep to adipose tissue with a #15 scalpel blade.  The skin margins were undermined to an appropriate distance in all directions utilizing iris scissors.
Burow's Advancement Flap Text: The defect edges were debeveled with a #15 scalpel blade.  Given the location of the defect and the proximity to free margins a Burow's advancement flap was deemed most appropriate.  Using a sterile surgical marker, the appropriate advancement flap was drawn incorporating the defect and placing the expected incisions within the relaxed skin tension lines where possible.    The area thus outlined was incised deep to adipose tissue with a #15 scalpel blade.  The skin margins were undermined to an appropriate distance in all directions utilizing iris scissors.
Chonodrocutaneous Helical Advancement Flap Text: The defect edges were debeveled with a #15 scalpel blade.  Given the location of the defect and the proximity to free margins a chondrocutaneous helical advancement flap was deemed most appropriate.  Using a sterile surgical marker, the appropriate advancement flap was drawn incorporating the defect and placing the expected incisions within the relaxed skin tension lines where possible.    The area thus outlined was incised deep to adipose tissue with a #15 scalpel blade.  The skin margins were undermined to an appropriate distance in all directions utilizing iris scissors.
Crescentic Advancement Flap Text: The defect edges were debeveled with a #15 scalpel blade.  Given the location of the defect and the proximity to free margins a crescentic advancement flap was deemed most appropriate.  Using a sterile surgical marker, the appropriate advancement flap was drawn incorporating the defect and placing the expected incisions within the relaxed skin tension lines where possible.    The area thus outlined was incised deep to adipose tissue with a #15 scalpel blade.  The skin margins were undermined to an appropriate distance in all directions utilizing iris scissors.
Dorsal Nasal Flap Text: The defect edges were debeveled with a #15 scalpel blade.  Given the location of the defect and the proximity to free margins a dorsal nasal flap,based upon the glabellar folds, was deemed most appropriate.  Using a sterile surgical marker, an appropriate dorsal nasal flap was drawn around the defect.    The area thus outlined was incised deep to adipose tissue with a #15 scalpel blade.  The skin margins were undermined to an appropriate distance in all directions utilizing iris scissors.
Double Island Pedicle Flap Text: The defect edges were debeveled with a #15 scalpel blade.  Given the location of the defect, shape of the defect and the proximity to free margins a double island pedicle advancement flap was deemed most appropriate.  Using a sterile surgical marker, an appropriate advancement flap was drawn incorporating the defect, outlining the appropriate donor tissue and placing the expected incisions within the relaxed skin tension lines where possible.    The area thus outlined was incised deep to adipose tissue with a #15 scalpel blade.  The skin margins were undermined to an appropriate distance in all directions around the primary defect and laterally outward around the island pedicle utilizing iris scissors.  There was minimal undermining beneath the pedicle flap.
Double O-Z Flap Text: The defect edges were debeveled with a #15 scalpel blade.  Given the location of the defect, shape of the defect and the proximity to free margins a Double O-Z flap was deemed most appropriate.  Using a sterile surgical marker, an appropriate transposition flap was drawn incorporating the defect and placing the expected incisions within the relaxed skin tension lines where possible. The area thus outlined was incised deep to adipose tissue with a #15 scalpel blade.  The skin margins were undermined to an appropriate distance in all directions utilizing iris scissors.
Double O-Z Plasty Text: The defect edges were debeveled with a #15 scalpel blade.  Given the location of the defect, shape of the defect and the proximity to free margins a Double O-Z plasty (double transposition flap) was deemed most appropriate.  Using a sterile surgical marker, the appropriate transposition flaps were drawn incorporating the defect and placing the expected incisions within the relaxed skin tension lines where possible. The area thus outlined was incised deep to adipose tissue with a #15 scalpel blade.  The skin margins were undermined to an appropriate distance in all directions utilizing iris scissors.  Hemostasis was achieved with electrocautery.  The flaps were then transposed into place, one clockwise and the other counterclockwise, and anchored with interrupted buried subcutaneous sutures.
Double Z Plasty Text: The lesion was extirpated to the level of the fat with a #15 scalpel blade. Given the location of the defect, shape of the defect and the proximity to free margins a double Z-plasty was deemed most appropriate for repair. Using a sterile surgical marker, the appropriate transposition arms of the double Z-plasty were drawn incorporating the defect and placing the expected incisions within the relaxed skin tension lines where possible. The area thus outlined was incised deep to adipose tissue with a #15 scalpel blade. The skin margins were undermined to an appropriate distance in all directions utilizing iris scissors. The opposing transposition arms were then transposed and carried over into place in opposite direction and anchored with interrupted buried subcutaneous sutures.
Ear Star Wedge Flap Text: The defect edges were debeveled with a #15 blade scalpel.  Given the location of the defect and the proximity to free margins (helical rim) an ear star wedge flap was deemed most appropriate.  Using a sterile surgical marker, the appropriate flap was drawn incorporating the defect and placing the expected incisions between the helical rim and antihelix where possible.  The area thus outlined was incised through and through with a #15 scalpel blade.
Flip-Flop Flap Text: The defect edges were debeveled with a #15 blade scalpel.  Given the location of the defect and the proximity to free margins a flip-flop flap was deemed most appropriate. Using a sterile surgical marker, the appropriate flap was drawn incorporating the defect and placing the expected incisions between the helical rim and antihelix where possible.  The area thus outlined was incised through and through with a #15 scalpel blade. Following this, the designed flap was carried over into the primary defect and sutured into place.
Hatchet Flap Text: The defect edges were debeveled with a #15 scalpel blade.  Given the location of the defect, shape of the defect and the proximity to free margins a hatchet flap based from the glabella was deemed most appropriate.  Using a sterile surgical marker, an appropriate glabellar hatchet flap was drawn incorporating the defect and placing the expected incisions within the relaxed skin tension lines where possible.    The area thus outlined was incised deep to adipose tissue with a #15 scalpel blade.  The skin margins were undermined to an appropriate distance in all directions utilizing iris scissors.
Helical Rim Advancement Flap Text: The defect edges were debeveled with a #15 blade scalpel.  Given the location of the defect and the proximity to free margins (helical rim) a double helical rim advancement flap was deemed most appropriate.  Using a sterile surgical marker, the appropriate advancement flaps were drawn incorporating the defect and placing the expected incisions between the helical rim and antihelix where possible.  The area thus outlined was incised through and through with a #15 scalpel blade.  With a skin hook and iris scissors, the flaps were gently and sharply undermined and freed up.
H Plasty Text: Given the location of the defect, shape of the defect and the proximity to free margins a H-plasty was deemed most appropriate for repair.  Using a sterile surgical marker, the appropriate advancement arms of the H-plasty were drawn incorporating the defect and placing the expected incisions within the relaxed skin tension lines where possible. The area thus outlined was incised deep to adipose tissue with a #15 scalpel blade. The skin margins were undermined to an appropriate distance in all directions utilizing iris scissors.  The opposing advancement arms were then advanced into place in opposite direction and anchored with interrupted buried subcutaneous sutures.
Island Pedicle Flap Text: The defect edges were debeveled with a #15 scalpel blade.  Given the location of the defect, shape of the defect and the proximity to free margins an island pedicle advancement flap was deemed most appropriate.  Using a sterile surgical marker, an appropriate advancement flap was drawn incorporating the defect, outlining the appropriate donor tissue and placing the expected incisions within the relaxed skin tension lines where possible.    The area thus outlined was incised deep to adipose tissue with a #15 scalpel blade.  The skin margins were undermined to an appropriate distance in all directions around the primary defect and laterally outward around the island pedicle utilizing iris scissors.  There was minimal undermining beneath the pedicle flap.
Island Pedicle Flap With Canthal Suspension Text: The defect edges were debeveled with a #15 scalpel blade.  Given the location of the defect, shape of the defect and the proximity to free margins an island pedicle advancement flap was deemed most appropriate.  Using a sterile surgical marker, an appropriate advancement flap was drawn incorporating the defect, outlining the appropriate donor tissue and placing the expected incisions within the relaxed skin tension lines where possible. The area thus outlined was incised deep to adipose tissue with a #15 scalpel blade.  The skin margins were undermined to an appropriate distance in all directions around the primary defect and laterally outward around the island pedicle utilizing iris scissors.  There was minimal undermining beneath the pedicle flap. A suspension suture was placed in the canthal tendon to prevent tension and prevent ectropion.
Island Pedicle Flap-Requiring Vessel Identification Text: The defect edges were debeveled with a #15 scalpel blade.  Given the location of the defect, shape of the defect and the proximity to free margins an island pedicle advancement flap was deemed most appropriate.  Using a sterile surgical marker, an appropriate advancement flap was drawn, based on the axial vessel mentioned above, incorporating the defect, outlining the appropriate donor tissue and placing the expected incisions within the relaxed skin tension lines where possible.    The area thus outlined was incised deep to adipose tissue with a #15 scalpel blade.  The skin margins were undermined to an appropriate distance in all directions around the primary defect and laterally outward around the island pedicle utilizing iris scissors.  There was minimal undermining beneath the pedicle flap.
Keystone Flap Text: The defect edges were debeveled with a #15 scalpel blade.  Given the location of the defect, shape of the defect a keystone flap was deemed most appropriate.  Using a sterile surgical marker, an appropriate keystone flap was drawn incorporating the defect, outlining the appropriate donor tissue and placing the expected incisions within the relaxed skin tension lines where possible. The area thus outlined was incised deep to adipose tissue with a #15 scalpel blade.  The skin margins were undermined to an appropriate distance in all directions around the primary defect and laterally outward around the flap utilizing iris scissors.
Melolabial Transposition Flap Text: The defect edges were debeveled with a #15 scalpel blade.  Given the location of the defect and the proximity to free margins a melolabial flap was deemed most appropriate.  Using a sterile surgical marker, an appropriate melolabial transposition flap was drawn incorporating the defect.    The area thus outlined was incised deep to adipose tissue with a #15 scalpel blade.  The skin margins were undermined to an appropriate distance in all directions utilizing iris scissors.
Mercedes Flap Text: The defect edges were debeveled with a #15 scalpel blade.  Given the location of the defect, shape of the defect and the proximity to free margins a Mercedes flap was deemed most appropriate.  Using a sterile surgical marker, an appropriate advancement flap was drawn incorporating the defect and placing the expected incisions within the relaxed skin tension lines where possible. The area thus outlined was incised deep to adipose tissue with a #15 scalpel blade.  The skin margins were undermined to an appropriate distance in all directions utilizing iris scissors.
Modified Advancement Flap Text: The defect edges were debeveled with a #15 scalpel blade.  Given the location of the defect, shape of the defect and the proximity to free margins a modified advancement flap was deemed most appropriate.  Using a sterile surgical marker, an appropriate advancement flap was drawn incorporating the defect and placing the expected incisions within the relaxed skin tension lines where possible.    The area thus outlined was incised deep to adipose tissue with a #15 scalpel blade.  The skin margins were undermined to an appropriate distance in all directions utilizing iris scissors.
Mucosal Advancement Flap Text: Given the location of the defect, shape of the defect and the proximity to free margins a mucosal advancement flap was deemed most appropriate. Incisions were made with a 15 blade scalpel in the appropriate fashion along the cutaneous vermilion border and the mucosal lip. The remaining actinically damaged mucosal tissue was excised.  The mucosal advancement flap was then elevated to the gingival sulcus with care taken to preserve the neurovascular structures and advanced into the primary defect. Care was taken to ensure that precise realignment of the vermilion border was achieved.
Muscle Hinge Flap Text: The defect edges were debeveled with a #15 scalpel blade.  Given the size, depth and location of the defect and the proximity to free margins a muscle hinge flap was deemed most appropriate.  Using a sterile surgical marker, an appropriate hinge flap was drawn incorporating the defect. The area thus outlined was incised with a #15 scalpel blade.  The skin margins were undermined to an appropriate distance in all directions utilizing iris scissors.
Mustarde Flap Text: The defect edges were debeveled with a #15 scalpel blade.  Given the size, depth and location of the defect and the proximity to free margins a Mustarde flap was deemed most appropriate.  Using a sterile surgical marker, an appropriate flap was drawn incorporating the defect. The area thus outlined was incised with a #15 scalpel blade.  The skin margins were undermined to an appropriate distance in all directions utilizing iris scissors.
Nasal Turnover Hinge Flap Text: The defect edges were debeveled with a #15 scalpel blade.  Given the size, depth, location of the defect and the defect being full thickness a nasal turnover hinge flap was deemed most appropriate.  Using a sterile surgical marker, an appropriate hinge flap was drawn incorporating the defect. The area thus outlined was incised with a #15 scalpel blade. The flap was designed to recreate the nasal mucosal lining and the alar rim. The skin margins were undermined to an appropriate distance in all directions utilizing iris scissors.
Nasalis-Muscle-Based Myocutaneous Island Pedicle Flap Text: Using a #15 blade, an incision was made around the donor flap to the level of the nasalis muscle. Wide lateral undermining was then performed in both the subcutaneous plane above the nasalis muscle, and in a submuscular plane just above periosteum. This allowed the formation of a free nasalis muscle axial pedicle (based on the angular artery) which was still attached to the actual cutaneous flap, increasing its mobility and vascular viability. Hemostasis was obtained with pinpoint electrocoagulation. The flap was mobilized into position and the pivotal anchor points positioned and stabilized with buried interrupted sutures. Subcutaneous and dermal tissues were closed in a multilayered fashion with sutures. Tissue redundancies were excised, and the epidermal edges were apposed without significant tension and sutured with sutures.
Nasalis Myocutaneous Flap Text: Using a #15 blade, an incision was made around the donor flap to the level of the nasalis muscle. Wide lateral undermining was then performed in both the subcutaneous plane above the nasalis muscle, and in a submuscular plane just above periosteum. This allowed the formation of a free nasalis muscle axial pedicle which was still attached to the actual cutaneous flap, increasing its mobility and vascular viability. Hemostasis was obtained with pinpoint electrocoagulation. The flap was mobilized into position and the pivotal anchor points positioned and stabilized with buried interrupted sutures. Subcutaneous and dermal tissues were closed in a multilayered fashion with sutures. Tissue redundancies were excised, and the epidermal edges were apposed without significant tension and sutured with sutures.
Nasolabial Transposition Flap Text: The defect edges were debeveled with a #15 scalpel blade.  Given the size, depth and location of the defect and the proximity to free margins a nasolabial transposition flap was deemed most appropriate. Using a sterile surgical marker, an appropriate flap was drawn incorporating the defect. The area thus outlined was incised with a #15 scalpel blade. The skin margins were undermined to an appropriate distance in all directions utilizing iris scissors. Following this, the designed flap was carried into the primary defect and sutured into place.
Orbicularis Oris Muscle Flap Text: The defect edges were debeveled with a #15 scalpel blade.  Given that the defect affected the competency of the oral sphincter an orbicularis oris muscle flap was deemed most appropriate to restore this competency and normal muscle function.  Using a sterile surgical marker, an appropriate flap was drawn incorporating the defect. The area thus outlined was incised with a #15 scalpel blade.
O-T Advancement Flap Text: The defect edges were debeveled with a #15 scalpel blade.  Given the location of the defect, shape of the defect and the proximity to free margins an O-T advancement flap was deemed most appropriate.  Using a sterile surgical marker, an appropriate advancement flap was drawn incorporating the defect and placing the expected incisions within the relaxed skin tension lines where possible.    The area thus outlined was incised deep to adipose tissue with a #15 scalpel blade.  The skin margins were undermined to an appropriate distance in all directions utilizing iris scissors.
O-T Plasty Text: The defect edges were debeveled with a #15 scalpel blade.  Given the location of the defect, shape of the defect and the proximity to free margins an O-T plasty was deemed most appropriate.  Using a sterile surgical marker, an appropriate O-T plasty was drawn incorporating the defect and placing the expected incisions within the relaxed skin tension lines where possible.    The area thus outlined was incised deep to adipose tissue with a #15 scalpel blade.  The skin margins were undermined to an appropriate distance in all directions utilizing iris scissors.
O-L Flap Text: The defect edges were debeveled with a #15 scalpel blade.  Given the location of the defect, shape of the defect and the proximity to free margins an O-L flap was deemed most appropriate.  Using a sterile surgical marker, an appropriate advancement flap was drawn incorporating the defect and placing the expected incisions within the relaxed skin tension lines where possible.    The area thus outlined was incised deep to adipose tissue with a #15 scalpel blade.  The skin margins were undermined to an appropriate distance in all directions utilizing iris scissors.
O-Z Flap Text: The defect edges were debeveled with a #15 scalpel blade.  Given the location of the defect, shape of the defect and the proximity to free margins an O-Z flap was deemed most appropriate.  Using a sterile surgical marker, an appropriate transposition flap was drawn incorporating the defect and placing the expected incisions within the relaxed skin tension lines where possible. The area thus outlined was incised deep to adipose tissue with a #15 scalpel blade.  The skin margins were undermined to an appropriate distance in all directions utilizing iris scissors.
O-Z Plasty Text: The defect edges were debeveled with a #15 scalpel blade.  Given the location of the defect, shape of the defect and the proximity to free margins an O-Z plasty (double transposition flap) was deemed most appropriate.  Using a sterile surgical marker, the appropriate transposition flaps were drawn incorporating the defect and placing the expected incisions within the relaxed skin tension lines where possible.    The area thus outlined was incised deep to adipose tissue with a #15 scalpel blade.  The skin margins were undermined to an appropriate distance in all directions utilizing iris scissors.  Hemostasis was achieved with electrocautery.  The flaps were then transposed into place, one clockwise and the other counterclockwise, and anchored with interrupted buried subcutaneous sutures.
Peng Advancement Flap Text: The defect edges were debeveled with a #15 scalpel blade.  Given the location of the defect, shape of the defect and the proximity to free margins a Peng advancement flap was deemed most appropriate.  Using a sterile surgical marker, an appropriate advancement flap was drawn incorporating the defect and placing the expected incisions within the relaxed skin tension lines where possible. The area thus outlined was incised deep to adipose tissue with a #15 scalpel blade.  The skin margins were undermined to an appropriate distance in all directions utilizing iris scissors.
Rectangular Flap Text: The defect edges were debeveled with a #15 scalpel blade. Given the location of the defect and the proximity to free margins a rectangular flap was deemed most appropriate. Using a sterile surgical marker, an appropriate rectangular flap was drawn incorporating the defect. The area thus outlined was incised deep to adipose tissue with a #15 scalpel blade. The skin margins were undermined to an appropriate distance in all directions utilizing iris scissors. Following this, the designed flap was carried over into the primary defect and sutured into place.
Rhombic Flap Text: The defect edges were debeveled with a #15 scalpel blade.  Given the location of the defect and the proximity to free margins a rhombic flap was deemed most appropriate.  Using a sterile surgical marker, an appropriate rhombic flap was drawn incorporating the defect.    The area thus outlined was incised deep to adipose tissue with a #15 scalpel blade.  The skin margins were undermined to an appropriate distance in all directions utilizing iris scissors.
Rhomboid Transposition Flap Text: The defect edges were debeveled with a #15 scalpel blade.  Given the location of the defect and the proximity to free margins a rhomboid transposition flap was deemed most appropriate.  Using a sterile surgical marker, an appropriate rhomboid flap was drawn incorporating the defect.    The area thus outlined was incised deep to adipose tissue with a #15 scalpel blade.  The skin margins were undermined to an appropriate distance in all directions utilizing iris scissors.
Rotation Flap Text: The defect edges were debeveled with a #15 scalpel blade.  Given the location of the defect, shape of the defect and the proximity to free margins a rotation flap was deemed most appropriate.  Using a sterile surgical marker, an appropriate rotation flap was drawn incorporating the defect and placing the expected incisions within the relaxed skin tension lines where possible.    The area thus outlined was incised deep to adipose tissue with a #15 scalpel blade.  The skin margins were undermined to an appropriate distance in all directions utilizing iris scissors.
Spiral Flap Text: The defect edges were debeveled with a #15 scalpel blade.  Given the location of the defect, shape of the defect and the proximity to free margins a spiral flap was deemed most appropriate.  Using a sterile surgical marker, an appropriate rotation flap was drawn incorporating the defect and placing the expected incisions within the relaxed skin tension lines where possible. The area thus outlined was incised deep to adipose tissue with a #15 scalpel blade.  The skin margins were undermined to an appropriate distance in all directions utilizing iris scissors.
Staged Advancement Flap Text: The defect edges were debeveled with a #15 scalpel blade.  Given the location of the defect, shape of the defect and the proximity to free margins a staged advancement flap was deemed most appropriate.  Using a sterile surgical marker, an appropriate advancement flap was drawn incorporating the defect and placing the expected incisions within the relaxed skin tension lines where possible. The area thus outlined was incised deep to adipose tissue with a #15 scalpel blade.  The skin margins were undermined to an appropriate distance in all directions utilizing iris scissors.
Star Wedge Flap Text: The defect edges were debeveled with a #15 scalpel blade.  Given the location of the defect, shape of the defect and the proximity to free margins a star wedge flap was deemed most appropriate.  Using a sterile surgical marker, an appropriate rotation flap was drawn incorporating the defect and placing the expected incisions within the relaxed skin tension lines where possible. The area thus outlined was incised deep to adipose tissue with a #15 scalpel blade.  The skin margins were undermined to an appropriate distance in all directions utilizing iris scissors.
Transposition Flap Text: The defect edges were debeveled with a #15 scalpel blade.  Given the location of the defect and the proximity to free margins a transposition flap was deemed most appropriate.  Using a sterile surgical marker, an appropriate transposition flap was drawn incorporating the defect.    The area thus outlined was incised deep to adipose tissue with a #15 scalpel blade.  The skin margins were undermined to an appropriate distance in all directions utilizing iris scissors.
Trilobed Flap Text: The defect edges were debeveled with a #15 scalpel blade.  Given the location of the defect and the proximity to free margins a trilobed flap was deemed most appropriate.  Using a sterile surgical marker, an appropriate trilobed flap drawn around the defect.    The area thus outlined was incised deep to adipose tissue with a #15 scalpel blade.  The skin margins were undermined to an appropriate distance in all directions utilizing iris scissors.
V-Y Flap Text: The defect edges were debeveled with a #15 scalpel blade.  Given the location of the defect, shape of the defect and the proximity to free margins a V-Y flap was deemed most appropriate.  Using a sterile surgical marker, an appropriate advancement flap was drawn incorporating the defect and placing the expected incisions within the relaxed skin tension lines where possible.    The area thus outlined was incised deep to adipose tissue with a #15 scalpel blade.  The skin margins were undermined to an appropriate distance in all directions utilizing iris scissors.
V-Y Plasty Text: The defect edges were debeveled with a #15 scalpel blade.  Given the location of the defect, shape of the defect and the proximity to free margins an V-Y advancement flap was deemed most appropriate.  Using a sterile surgical marker, an appropriate advancement flap was drawn incorporating the defect and placing the expected incisions within the relaxed skin tension lines where possible.    The area thus outlined was incised deep to adipose tissue with a #15 scalpel blade.  The skin margins were undermined to an appropriate distance in all directions utilizing iris scissors.
W Plasty Text: The lesion was extirpated to the level of the fat with a #15 scalpel blade.  Given the location of the defect, shape of the defect and the proximity to free margins a W-plasty was deemed most appropriate for repair.  Using a sterile surgical marker, the appropriate transposition arms of the W-plasty were drawn incorporating the defect and placing the expected incisions within the relaxed skin tension lines where possible.    The area thus outlined was incised deep to adipose tissue with a #15 scalpel blade.  The skin margins were undermined to an appropriate distance in all directions utilizing iris scissors.  The opposing transposition arms were then transposed into place in opposite direction and anchored with interrupted buried subcutaneous sutures.
Z Plasty Text: The lesion was extirpated to the level of the fat with a #15 scalpel blade.  Given the location of the defect, shape of the defect and the proximity to free margins a Z-plasty was deemed most appropriate for repair.  Using a sterile surgical marker, the appropriate transposition arms of the Z-plasty were drawn incorporating the defect and placing the expected incisions within the relaxed skin tension lines where possible.    The area thus outlined was incised deep to adipose tissue with a #15 scalpel blade.  The skin margins were undermined to an appropriate distance in all directions utilizing iris scissors.  The opposing transposition arms were then transposed into place in opposite direction and anchored with interrupted buried subcutaneous sutures.
Zygomaticofacial Flap Text: Given the location of the defect, shape of the defect and the proximity to free margins a zygomaticofacial flap was deemed most appropriate for repair.  Using a sterile surgical marker, the appropriate flap was drawn incorporating the defect and placing the expected incisions within the relaxed skin tension lines where possible. The area thus outlined was incised deep to adipose tissue with a #15 scalpel blade with preservation of a vascular pedicle.  The skin margins were undermined to an appropriate distance in all directions utilizing iris scissors.  The flap was then placed into the defect and anchored with interrupted buried subcutaneous sutures.
Abbe Flap (Lower To Upper Lip) Text: The defect of the upper lip was assessed and measured.  Given the location and size of the defect, an Abbe flap was deemed most appropriate.  Using a sterile surgical marker, an appropriate Abbe flap was measured and drawn on the lower lip. Local anesthesia was then infiltrated. A scalpel was then used to incise the upper lip through and through the skin, vermilion, muscle and mucosa, leaving the flap pedicled on the opposite side.  The flap was then rotated and transferred to the lower lip defect.  The flap was then sutured into place with a three layer technique, closing the orbicularis oris muscle layer with subcutaneous buried sutures, followed by a mucosal layer and an epidermal layer.
Abbe Flap (Upper To Lower Lip) Text: The defect of the lower lip was assessed and measured.  Given the location and size of the defect, an Abbe flap was deemed most appropriate.  Using a sterile surgical marker, an appropriate Abbe flap was measured and drawn on the upper lip. Local anesthesia was then infiltrated.  A scalpel was then used to incise the upper lip through and through the skin, vermilion, muscle and mucosa, leaving the flap pedicled on the opposite side.  The flap was then rotated and transferred to the lower lip defect.  The flap was then sutured into place with a three layer technique, closing the orbicularis oris muscle layer with subcutaneous buried sutures, followed by a mucosal layer and an epidermal layer.
Cheek Interpolation Flap Text: A decision was made to reconstruct the defect utilizing an interpolation axial flap and a staged reconstruction.  A telfa template was made of the defect.  This telfa template was then used to outline the Cheek Interpolation flap.  The donor area for the pedicle flap was then injected with anesthesia.  The flap was excised through the skin and subcutaneous tissue down to the layer of the underlying musculature.  The interpolation flap was carefully excised within this deep plane to maintain its blood supply.  The edges of the donor site were undermined.   The donor site was closed in a primary fashion.  The pedicle was then rotated into position and sutured.  Once the tube was sutured into place, adequate blood supply was confirmed with blanching and refill.  The pedicle was then wrapped with xeroform gauze and dressed appropriately with a telfa and gauze bandage to ensure continued blood supply and protect the attached pedicle.
Cheek-To-Nose Interpolation Flap Text: A decision was made to reconstruct the defect utilizing an interpolation axial flap and a staged reconstruction.  A telfa template was made of the defect.  This telfa template was then used to outline the Cheek-To-Nose Interpolation flap.  The donor area for the pedicle flap was then injected with anesthesia.  The flap was excised through the skin and subcutaneous tissue down to the layer of the underlying musculature.  The interpolation flap was carefully excised within this deep plane to maintain its blood supply.  The edges of the donor site were undermined.   The donor site was closed in a primary fashion.  The pedicle was then rotated into position and sutured.  Once the tube was sutured into place, adequate blood supply was confirmed with blanching and refill.  The pedicle was then wrapped with xeroform gauze and dressed appropriately with a telfa and gauze bandage to ensure continued blood supply and protect the attached pedicle.
Estlander Flap (Lower To Upper Lip) Text: The defect of the lower lip was assessed and measured.  Given the location and size of the defect, an Estlander flap was deemed most appropriate.  Using a sterile surgical marker, an appropriate Estlander flap was measured and drawn on the upper lip. Local anesthesia was then infiltrated. A scalpel was then used to incise the lateral aspect of the flap, through skin, muscle and mucosa, leaving the flap pedicled medially.  The flap was then rotated and positioned to fill the lower lip defect.  The flap was then sutured into place with a three layer technique, closing the orbicularis oris muscle layer with subcutaneous buried sutures, followed by a mucosal layer and an epidermal layer.
Interpolation Flap Text: A decision was made to reconstruct the defect utilizing an interpolation axial flap and a staged reconstruction.  A telfa template was made of the defect.  This telfa template was then used to outline the interpolation flap.  The donor area for the pedicle flap was then injected with anesthesia.  The flap was excised through the skin and subcutaneous tissue down to the layer of the underlying musculature.  The interpolation flap was carefully excised within this deep plane to maintain its blood supply.  The edges of the donor site were undermined.   The donor site was closed in a primary fashion.  The pedicle was then rotated into position and sutured.  Once the tube was sutured into place, adequate blood supply was confirmed with blanching and refill.  The pedicle was then wrapped with xeroform gauze and dressed appropriately with a telfa and gauze bandage to ensure continued blood supply and protect the attached pedicle.
Melolabial Interpolation Flap Text: A decision was made to reconstruct the defect utilizing an interpolation axial flap and a staged reconstruction.  A telfa template was made of the defect.  This telfa template was then used to outline the melolabial interpolation flap.  The donor area for the pedicle flap was then injected with anesthesia.  The flap was excised through the skin and subcutaneous tissue down to the layer of the underlying musculature.  The pedicle flap was carefully excised within this deep plane to maintain its blood supply.  The edges of the donor site were undermined.   The donor site was closed in a primary fashion.  The pedicle was then rotated into position and sutured.  Once the tube was sutured into place, adequate blood supply was confirmed with blanching and refill.  The pedicle was then wrapped with xeroform gauze and dressed appropriately with a telfa and gauze bandage to ensure continued blood supply and protect the attached pedicle.
Mastoid Interpolation Flap Text: A decision was made to reconstruct the defect utilizing an interpolation axial flap and a staged reconstruction.  A telfa template was made of the defect.  This telfa template was then used to outline the mastoid interpolation flap.  The donor area for the pedicle flap was then injected with anesthesia.  The flap was excised through the skin and subcutaneous tissue down to the layer of the underlying musculature.  The pedicle flap was carefully excised within this deep plane to maintain its blood supply.  The edges of the donor site were undermined.   The donor site was closed in a primary fashion.  The pedicle was then rotated into position and sutured.  Once the tube was sutured into place, adequate blood supply was confirmed with blanching and refill.  The pedicle was then wrapped with xeroform gauze and dressed appropriately with a telfa and gauze bandage to ensure continued blood supply and protect the attached pedicle.
Paramedian Forehead Flap Text: A decision was made to reconstruct the defect utilizing an interpolation axial flap and a staged reconstruction.  A telfa template was made of the defect.  This telfa template was then used to outline the paramedian forehead pedicle flap.  The donor area for the pedicle flap was then injected with anesthesia.  The flap was excised through the skin and subcutaneous tissue down to the layer of the underlying musculature.  The pedicle flap was carefully excised within this deep plane to maintain its blood supply.  The edges of the donor site were undermined.   The donor site was closed in a primary fashion.  The pedicle was then rotated into position and sutured.  Once the tube was sutured into place, adequate blood supply was confirmed with blanching and refill.  The pedicle was then wrapped with xeroform gauze and dressed appropriately with a telfa and gauze bandage to ensure continued blood supply and protect the attached pedicle.
Posterior Auricular Interpolation Flap Text: A decision was made to reconstruct the defect utilizing an interpolation axial flap and a staged reconstruction.  A telfa template was made of the defect.  This telfa template was then used to outline the posterior auricular interpolation flap.  The donor area for the pedicle flap was then injected with anesthesia.  The flap was excised through the skin and subcutaneous tissue down to the layer of the underlying musculature.  The pedicle flap was carefully excised within this deep plane to maintain its blood supply.  The edges of the donor site were undermined.   The donor site was closed in a primary fashion.  The pedicle was then rotated into position and sutured.  Once the tube was sutured into place, adequate blood supply was confirmed with blanching and refill.  The pedicle was then wrapped with xeroform gauze and dressed appropriately with a telfa and gauze bandage to ensure continued blood supply and protect the attached pedicle.
Cheiloplasty (Complex) Text: A decision was made to reconstruct the defect with a  cheiloplasty.  The defect was undermined extensively.  Additional obicularis oris muscle was excised with a 15 blade scalpel.  The defect was converted into a full thickness wedge to facilite a better cosmetic result.  Small vessels were then tied off with 5-0 monocyrl. The obicularis oris, superficial fascia, adipose and dermis were then reapproximated.  After the deeper layers were approximated the epidermis was reapproximated with particular care given to realign the vermilion border.
Cheiloplasty (Less Than 50%) Text: A decision was made to reconstruct the defect with a  cheiloplasty.  The defect was undermined extensively.  Additional obicularis oris muscle was excised with a 15 blade scalpel.  The defect was converted into a full thickness wedge, of less than 50% of the vertical height of the lip, to facilite a better cosmetic result.  Small vessels were then tied off with 5-0 monocyrl. The obicularis oris, superficial fascia, adipose and dermis were then reapproximated.  After the deeper layers were approximated the epidermis was reapproximated with particular care given to realign the vermilion border.
Ear Wedge Repair Text: A wedge excision was completed by carrying down an excision through the full thickness of the ear and cartilage with an inward facing Burow's triangle. The wound was then closed in a layered fashion.
Full Thickness Lip Wedge Repair (Flap) Text: Given the location of the defect and the proximity to free margins a full thickness wedge repair was deemed most appropriate.  Using a sterile surgical marker, the appropriate repair was drawn incorporating the defect and placing the expected incisions perpendicular to the vermilion border.  The vermilion border was also meticulously outlined to ensure appropriate reapproximation during the repair.  The area thus outlined was incised through and through with a #15 scalpel blade.  The muscularis and dermis were reaproximated with deep sutures following hemostasis. Care was taken to realign the vermilion border before proceeding with the superficial closure.  Once the vermilion was realigned the superfical and mucosal closure was finished.
Burow's Graft Text: The defect edges were debeveled with a #15 scalpel blade.  Given the location of the defect, shape of the defect, the proximity to free margins and the presence of a standing cone deformity a Burow's skin graft was deemed most appropriate. The standing cone was removed and this tissue was then trimmed to the shape of the primary defect. The adipose tissue was also removed until only dermis and epidermis were left.  The skin margins of the secondary defect were undermined to an appropriate distance in all directions utilizing iris scissors.  The secondary defect was closed with interrupted buried subcutaneous sutures.  The skin edges were then re-apposed with running  sutures.  The skin graft was then placed in the primary defect and oriented appropriately.
Cartilage Graft Text: The defect edges were debeveled with a #15 scalpel blade.  Given the location of the defect, shape of the defect, the fact the defect involved a full thickness cartilage defect a cartilage graft was deemed most appropriate.  An appropriate donor site was identified, cleansed, and anesthetized. The cartilage graft was then harvested and transferred to the recipient site, oriented appropriately and then sutured into place.  The secondary defect was then repaired using a primary closure.
Composite Graft Text: The defect edges were debeveled with a #15 scalpel blade.  Given the location of the defect, shape of the defect, the proximity to free margins and the fact the defect was full thickness a composite graft was deemed most appropriate.  The defect was outline and then transferred to the donor site.  A full thickness graft was then excised from the donor site. The graft was then placed in the primary defect, oriented appropriately and then sutured into place.  The secondary defect was then repaired using a primary closure.
Epidermal Autograft Text: The defect edges were debeveled with a #15 scalpel blade.  Given the location of the defect, shape of the defect and the proximity to free margins an epidermal autograft was deemed most appropriate.  Using a sterile surgical marker, the primary defect shape was transferred to the donor site. The epidermal graft was then harvested.  The skin graft was then placed in the primary defect and oriented appropriately.
Dermal Autograft Text: The defect edges were debeveled with a #15 scalpel blade.  Given the location of the defect, shape of the defect and the proximity to free margins a dermal autograft was deemed most appropriate.  Using a sterile surgical marker, the primary defect shape was transferred to the donor site. The area thus outlined was incised deep to adipose tissue with a #15 scalpel blade.  The harvested graft was then trimmed of adipose and epidermal tissue until only dermis was left.  The skin graft was then placed in the primary defect and oriented appropriately.
Ftsg Text: The defect edges were debeveled with a #15 scalpel blade.  Given the location of the defect, shape of the defect and the proximity to free margins a full thickness skin graft was deemed most appropriate.  Using a sterile surgical marker, the primary defect shape was transferred to the donor site. The area thus outlined was incised deep to adipose tissue with a #15 scalpel blade.  The harvested graft was then trimmed of adipose tissue until only dermis and epidermis was left.  The skin margins of the secondary defect were undermined to an appropriate distance in all directions utilizing iris scissors.  The secondary defect was closed with interrupted buried subcutaneous sutures.  The skin edges were then re-apposed with running  sutures.  The skin graft was then placed in the primary defect and oriented appropriately.
Pinch Graft Text: The defect edges were debeveled with a #15 scalpel blade. Given the location of the defect, shape of the defect and the proximity to free margins a pinch graft was deemed most appropriate. Using a sterile surgical marker, the primary defect shape was transferred to the donor site. The area thus outlined was incised deep to adipose tissue with a #15 scalpel blade.  The harvested graft was then trimmed of adipose tissue until only dermis and epidermis was left. The skin graft was then placed in the primary defect and oriented appropriately.
Skin Substitute Text: The defect edges were debeveled with a #15 scalpel blade.  Given the location of the defect, shape of the defect and the proximity to free margins a skin substitute graft was deemed most appropriate.  The graft material was trimmed to fit the size of the defect. The graft was then placed in the primary defect and oriented appropriately.
Split-Thickness Skin Graft Text: The defect edges were debeveled with a #15 scalpel blade.  Given the location of the defect, shape of the defect and the proximity to free margins a split thickness skin graft was deemed most appropriate.  Using a sterile surgical marker, the primary defect shape was transferred to the donor site. The split thickness graft was then harvested.  The skin graft was then placed in the primary defect and oriented appropriately.
Tissue Cultured Epidermal Autograft Text: The defect edges were debeveled with a #15 scalpel blade.  Given the location of the defect, shape of the defect and the proximity to free margins a tissue cultured epidermal autograft was deemed most appropriate.  The graft was then trimmed to fit the size of the defect.  The graft was then placed in the primary defect and oriented appropriately.
Xenograft Text: The defect edges were debeveled with a #15 scalpel blade.  Given the location of the defect, shape of the defect and the proximity to free margins a xenograft was deemed most appropriate.  The graft was then trimmed to fit the size of the defect.  The graft was then placed in the primary defect and oriented appropriately.
Complex Repair And Flap Additional Text (Will Appearing After The Standard Complex Repair Text): The complex repair was not sufficient to completely close the primary defect. The remaining additional defect was repaired with the flap mentioned below.
Complex Repair And Graft Additional Text (Will Appearing After The Standard Complex Repair Text): The complex repair was not sufficient to completely close the primary defect. The remaining additional defect was repaired with the graft mentioned below.
Eyelid Full Thickness Repair - 85129: The eyelid defect was full thickness which required a wedge repair of the eyelid. Special care was taken to ensure that the eyelid margin was realligned when placing sutures.
Eyelid Partial Thickness Repair - 23107: The eyelid defect was partial thickness which required a wedge repair of the eyelid. Special care was taken to ensure that the eyelid margin was realligned when placing sutures.
Intermediate Repair And Flap Additional Text (Will Appearing After The Standard Complex Repair Text): The intermediate repair was not sufficient to completely close the primary defect. The remaining additional defect was repaired with the flap mentioned below.
Intermediate Repair And Graft Additional Text (Will Appearing After The Standard Complex Repair Text): The intermediate repair was not sufficient to completely close the primary defect. The remaining additional defect was repaired with the graft mentioned below.
Localized Dermabrasion With 15 Blade Text: The patient was draped in routine manner.  Localized dermabrasion using a 15 blade was performed in routine manner to papillary dermis. This spot dermabrasion is being performed to complete skin cancer reconstruction. It also will eliminate the other sun damaged precancerous cells that are known to be part of the regional effect of a lifetime's worth of sun exposure. This localized dermabrasion is therapeutic and should not be considered cosmetic in any regard.
Localized Dermabrasion With Sand Papertext: The patient was draped in routine manner.  Localized dermabrasion using sterile sand paper was performed in routine manner to papillary dermis. This spot dermabrasion is being performed to complete skin cancer reconstruction. It also will eliminate the other sun damaged precancerous cells that are known to be part of the regional effect of a lifetime's worth of sun exposure. This localized dermabrasion is therapeutic and should not be considered cosmetic in any regard.
Localized Dermabrasion With Wire Brush Text: The patient was draped in routine manner.  Localized dermabrasion using 3 x 17 mm wire brush was performed in routine manner to papillary dermis. This spot dermabrasion is being performed to complete skin cancer reconstruction. It also will eliminate the other sun damaged precancerous cells that are known to be part of the regional effect of a lifetime's worth of sun exposure. This localized dermabrasion is therapeutic and should not be considered cosmetic in any regard.
Purse String (Simple) Text: Given the location of the defect and the characteristics of the surrounding skin a purse string closure was deemed most appropriate.  Undermining was performed circumfirentially around the surgical defect.  A purse string suture was then placed and tightened.
Purse String (Intermediate) Text: Given the location of the defect and the characteristics of the surrounding skin a purse string intermediate closure was deemed most appropriate.  Undermining was performed circumfirentially around the surgical defect.  A purse string suture was then placed and tightened.
Partial Purse String (Simple) Text: Given the location of the defect and the characteristics of the surrounding skin a simple purse string closure was deemed most appropriate.  Undermining was performed circumfirentially around the surgical defect.  A purse string suture was then placed and tightened. Wound tension only allowed a partial closure of the circular defect.
Partial Purse String (Intermediate) Text: Given the location of the defect and the characteristics of the surrounding skin an intermediate purse string closure was deemed most appropriate.  Undermining was performed circumfirentially around the surgical defect.  A purse string suture was then placed and tightened. Wound tension only allowed a partial closure of the circular defect.
Tarsorrhaphy Text: A tarsorrhaphy was performed using Frost sutures.
Manual Repair Warning Statement: We plan on removing the manually selected variable below in favor of our much easier automatic structured text blocks found in the previous tab. We decided to do this to help make the flow better and give you the full power of structured data. Manual selection is never going to be ideal in our platform and I would encourage you to avoid using manual selection from this point on, especially since I will be sunsetting this feature. It is important that you do one of two things with the customized text below. First, you can save all of the text in a word file so you can have it for future reference. Second, transfer the text to the appropriate area in the Library tab. Lastly, if there is a flap or graft type which we do not have you need to let us know right away so I can add it in before the variable is hidden. No need to panic, we plan to give you roughly 6 months to make the change.
Same Histology In Subsequent Stages Text: The pattern and morphology of the tumor is as described in the first stage.
No Residual Tumor Seen Histology Text: There were no malignant cells seen in the sections examined.
Inflammation Suggestive Of Cancer Camouflage Histology Text: There was a dense lymphocytic infiltrate which prevented adequate histologic evaluation of adjacent structures.
Bcc Histology Text: There were numerous aggregates of basaloid cells.
Bcc Infiltrative Histology Text: There were numerous aggregates of basaloid cells demonstrating an infiltrative pattern.
Mart-1 - Positive Histology Text: MART-1 staining demonstrates areas of higher density and clustering of melanocytes with Pagetoid spread upwards within the epidermis. The surgical margins are positive for tumor cells.
Mart-1 - Negative Histology Text: MART-1 staining demonstrates a normal density and pattern of melanocytes along the dermal-epidermal junction. The surgical margins are negative for tumor cells.
Information: Selecting Yes will display possible errors in your note based on the variables you have selected. This validation is only offered as a suggestion for you. PLEASE NOTE THAT THE VALIDATION TEXT WILL BE REMOVED WHEN YOU FINALIZE YOUR NOTE. IF YOU WANT TO FAX A PRELIMINARY NOTE YOU WILL NEED TO TOGGLE THIS TO 'NO' IF YOU DO NOT WANT IT IN YOUR FAXED NOTE.
Bill 59 Modifier?: No - Continue to Bill 79 Modifier

## 2024-09-12 ENCOUNTER — OFFICE VISIT (OUTPATIENT)
Dept: MEDICAL GROUP | Facility: LAB | Age: 69
End: 2024-09-12
Payer: MEDICARE

## 2024-09-12 VITALS
BODY MASS INDEX: 17.61 KG/M2 | RESPIRATION RATE: 16 BRPM | DIASTOLIC BLOOD PRESSURE: 76 MMHG | SYSTOLIC BLOOD PRESSURE: 110 MMHG | WEIGHT: 130 LBS | TEMPERATURE: 97.9 F | HEART RATE: 71 BPM | OXYGEN SATURATION: 95 % | HEIGHT: 72 IN

## 2024-09-12 DIAGNOSIS — K21.9 GASTROESOPHAGEAL REFLUX DISEASE WITHOUT ESOPHAGITIS: ICD-10-CM

## 2024-09-12 DIAGNOSIS — R19.7 DIARRHEA, UNSPECIFIED TYPE: ICD-10-CM

## 2024-09-12 DIAGNOSIS — Z63.5 MARITAL DISRUPTION INVOLVING ESTRANGEMENT: ICD-10-CM

## 2024-09-12 DIAGNOSIS — K50.019 CROHN'S DISEASE OF SMALL INTESTINE WITH COMPLICATION (HCC): ICD-10-CM

## 2024-09-12 PROCEDURE — 3078F DIAST BP <80 MM HG: CPT | Performed by: INTERNAL MEDICINE

## 2024-09-12 PROCEDURE — 99214 OFFICE O/P EST MOD 30 MIN: CPT | Performed by: INTERNAL MEDICINE

## 2024-09-12 PROCEDURE — 3074F SYST BP LT 130 MM HG: CPT | Performed by: INTERNAL MEDICINE

## 2024-09-12 RX ORDER — OXYCODONE HYDROCHLORIDE 10 MG/1
10 TABLET ORAL EVERY 6 HOURS PRN
Qty: 90 TABLET | Refills: 0 | Status: SHIPPED | OUTPATIENT
Start: 2024-09-12 | End: 2024-10-12

## 2024-09-12 RX ORDER — OXYCODONE HYDROCHLORIDE 10 MG/1
10 TABLET ORAL EVERY 4 HOURS PRN
Qty: 90 TABLET | Refills: 0 | Status: SHIPPED | OUTPATIENT
Start: 2024-10-12 | End: 2024-11-11

## 2024-09-12 RX ORDER — OXYCODONE HYDROCHLORIDE 10 MG/1
10 TABLET ORAL EVERY 6 HOURS PRN
Qty: 90 TABLET | Refills: 0 | Status: SHIPPED | OUTPATIENT
Start: 2024-11-11 | End: 2024-12-11

## 2024-09-12 SDOH — SOCIAL STABILITY - SOCIAL INSECURITY: DISRUPTION OF FAMILY BY SEPARATION AND DIVORCE: Z63.5

## 2024-09-12 ASSESSMENT — FIBROSIS 4 INDEX: FIB4 SCORE: 2.76

## 2024-09-12 NOTE — PROGRESS NOTES
CC: Jana Overton is a 69 y.o. female is suffering from   Chief Complaint   Patient presents with    Follow-Up         SUBJECTIVE:  1. Crohn's disease of small intestine with complication (HCC)  Jana is here for follow-up suffers from Crohn's disease of the small bowel status post colectomy with ostomy    2. Gastroesophageal reflux disease without esophagitis  History of severe gastroesophageal reflux disease clinically stable    3. Diarrhea, unspecified type  History of diarrhea secondary to colectomy    4. Marital disruption involving estrangement  Marital disruption with her  currently not talking to her and living separate from her    History of Present Illness      Past social, family, history:   Social History     Tobacco Use    Smoking status: Never    Smokeless tobacco: Never    Tobacco comments:     second hand smoke parents - smoked for only 1 year many years ago   Vaping Use    Vaping status: Every Day    Substances: THC   Substance Use Topics    Alcohol use: Not Currently     Alcohol/week: 0.6 oz     Types: 1 Shots of liquor per week     Comment: gin and half a lime; tonic water    Drug use: Yes     Types: Marijuana, Inhaled     Comment: medical marijuana through bong/vape         MEDICATIONS:    Current Outpatient Medications:     oxyCODONE immediate release (ROXICODONE) 10 MG immediate release tablet, Take 1 Tablet by mouth every 6 hours as needed for Moderate Pain (refill #1 of #3) for up to 30 days., Disp: 90 Tablet, Rfl: 0    [START ON 10/12/2024] oxyCODONE immediate release (ROXICODONE) 10 MG immediate release tablet, Take 1 Tablet by mouth every four hours as needed for Moderate Pain (Refill #2 of #3) for up to 30 days., Disp: 90 Tablet, Rfl: 0    [START ON 11/11/2024] oxyCODONE immediate release (ROXICODONE) 10 MG immediate release tablet, Take 1 Tablet by mouth every 6 hours as needed for Moderate Pain (Refill #3 of #3) for up to 30 days., Disp: 90 Tablet, Rfl: 0     cetirizine (ZYRTEC) 1 MG/ML Solution oral solution, Take 10 mL by mouth every day., Disp: 150 mL, Rfl: 3    potassium chloride SA (KDUR) 20 MEQ Tab CR, Take 1 Tablet by mouth 2 times a day., Disp: 180 Tablet, Rfl: 1    diazePAM (VALIUM) 5 MG Tab, TAKE 1 TABLET BY MOUTH EVERY 6 HOURS AS NEEDED FOR SPASMS, Disp: 30 Tablet, Rfl: 2    hydroxychloroquine (PLAQUENIL) 200 MG Tab, Take 1 Tablet by mouth every day., Disp: 90 Tablet, Rfl: 3    furosemide (LASIX) 20 MG Tab, Take 1 Tablet by mouth every day., Disp: 100 Tablet, Rfl: 0    spironolactone (ALDACTONE) 25 MG Tab, Take 1 Tablet by mouth every day., Disp: 90 Tablet, Rfl: 0    ondansetron (ZOFRAN ODT) 4 MG TABLET DISPERSIBLE, Take 1 Tablet by mouth every 6 hours as needed for Nausea/Vomiting., Disp: 20 Tablet, Rfl: 5    metoprolol tartrate (LOPRESSOR) 50 MG Tab, Take 1 Tablet by mouth 2 times a day., Disp: 180 Tablet, Rfl: 3    cetirizine (ZYRTEC) 1 MG/ML Solution oral solution, Take 10 mL by mouth every day., Disp: 473 mL, Rfl: 0    lidocaine (ASPERFLEX) 4 % Patch, Place 1 Patch on the skin every 24 hours. (Patient not taking: Reported on 7/18/2024), Disp: 30 Patch, Rfl: 2    NON SPECIFIED, [facility] Sacroiliac belt., Disp: 1 Each, Rfl: 1    Azelastine (ASTELIN) 137 MCG/SPRAY Solution, Administer 1 Spray into each nostril at bedtime., Disp: , Rfl:     fluticasone (FLONASE) 50 MCG/ACT nasal spray, Administer 1 Spray into each nostril at bedtime., Disp: , Rfl:     nystatin (MYCOSTATIN) 284363 UNIT/ML Suspension, Take 5 mL by mouth 4 times a day., Disp: 60 mL, Rfl: 0    prochlorperazine (COMPAZINE) 10 MG Tab, Take 1 Tablet by mouth every 6 hours as needed for Nausea/Vomiting., Disp: 30 Tablet, Rfl: 3    traZODone (DESYREL) 50 MG Tab, Take 1 Tablet by mouth every evening., Disp: 90 Tablet, Rfl: 3    granisetron (KYTRIL) 1 MG Tab, Take 1 Tablet by mouth every 12 hours as needed for Nausea/Vomiting., Disp: 10 Tablet, Rfl: 0    levalbuterol (XOPENEX HFA) 45  MCG/ACT inhaler, Inhale 2 Puffs every four hours as needed for Shortness of Breath (As needed for shortness of breath, cough, wheezing.)., Disp: 1 Each, Rfl: 11    Ascorbic Acid (VITAMIN C PO), Take 1 Tablet by mouth 2 times a day., Disp: , Rfl:     Biotin w/ Vitamins C & E (HAIR SKIN & NAILS GUMMIES PO), Take 1 Tablet by mouth 2 times a day., Disp: , Rfl:     naratriptan (AMERGE) 2.5 MG tablet, Take 1 Tablet by mouth as needed for Migraine., Disp: 5 Tablet, Rfl: 3    PROBLEMS:  Patient Active Problem List    Diagnosis Date Noted    Thrombocytopenia ([facility]) 12/11/2023    Chronic diastolic congestive heart failure ([facility]) 12/11/2023    Chronic kidney disease, stage 3b 11/22/2023    Transient alteration of awareness 10/26/2023    Achalasia 10/18/2023    Esophageal candidiasis ([facility]) 02/08/2023    Sacroiliac joint pain 11/10/2022    Esophageal stricture 10/22/2022    Dysphagia 10/21/2022    Drug-induced lupus erythematosus 09/27/2022    Long-term use of hydroxychloroquine 09/27/2022    Fibromyalgia syndrome 09/27/2022    Primary osteoarthritis of both hands 07/28/2022    Positive cardiolipin antibodies (IgA and IgG anti-CL) 07/27/2022    Positive VAHID (1:640 homogenous pattern with negative reflex) 07/27/2022    Inflammatory arthritis 07/27/2022    Mild tetrahydrocannabinol (THC) abuse 06/02/2022    Oropharyngeal dysphagia 06/02/2022    Acute pancreatitis 05/23/2022    Migraine 06/04/2019    Essential hypertension 05/20/2019    SIRS (systemic inflammatory response syndrome) ([facility]) 05/19/2019    History of MRSA infection 12/29/2018    History of infection with vancomycin resistant Enterococcus (VRE) 12/29/2018    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[facility]) 12/28/2018    Anemia 12/15/2018    Hyponatremia 12/12/2018    Leukocytosis 12/12/2018    Anxiety disorder due to multiple medical problems 12/01/2017    DDD (degenerative disc disease), lumbar 04/12/2017    History of DVT (deep vein thrombosis) 11/23/2016    Atrial fibrillation ([facility])  03/26/2015    Sleep apnea 10/26/2014    Postherpetic neuralgia 06/24/2014    Multiple falls 06/24/2014    COPD (chronic obstructive pulmonary disease) ([facility]) 06/24/2014    Rosacea 06/24/2014    Ileostomy in place ([facility]) 06/26/2013    GERD (gastroesophageal reflux disease) 12/05/2012    Status post lumbar surgery 07/16/2012    Crohn's disease of small intestine with complication ([facility]) 09/23/2009    Central sensitization to pain 09/23/2009    Opioid type dependence, continuous ([facility]) 09/23/2009    Degenerative joint disease of cervical and lumbar spine 09/23/2009       REVIEW OF SYSTEMS:  Gen.:  No Nausea, Vomiting, fever, Chills.  Heart: No chest pain.  Lungs:  No shortness of Breath.  Psychological: Anxiety associated with her  and her relationship     PHYSICAL EXAM   Physical Exam       Constitutional: Alert, cooperative, not in acute distress.  Cardiovascular:  Rate Rhythm is regular without murmurs rubs clicks.     Thorax & Lungs: Clear to auscultation, no wheezing, rhonchi, or rales  HENT: Normocephalic, Atraumatic.  Eyes: PERRLA, EOMI, Conjunctiva normal.   Neck: Trachia is midline no swelling of the thyroid.   Lymphatic: No lymphadenopathy noted.   Neurologic: Alert & oriented x 3, cranial nerves II through XII are intact, Normal motor function, Normal sensory function, No focal deficits noted.   Psychiatric: Affect normal, Judgment normal, Mood mildly depressed.     VITAL SIGNS:/76 (BP Location: Left arm, Patient Position: Sitting, BP Cuff Size: Adult)   Pulse 71   Temp 36.6 °C (97.9 °F)   Resp 16   Ht 1.829 m (6')   Wt 59 kg (130 lb)   SpO2 95%   BMI 17.63 kg/m²     Labs: Reviewed    Assessment:                                                     Plan:  Assessment & Plan       1. Crohn's disease of small intestine with complication ([facility])  Continue Roxicodone [place] drug task force reviewed  - oxyCODONE immediate release (ROXICODONE) 10 MG immediate release tablet; Take 1 Tablet by mouth  every 6 hours as needed for Moderate Pain (refill #1 of #3) for up to 30 days.  Dispense: 90 Tablet; Refill: 0  - oxyCODONE immediate release (ROXICODONE) 10 MG immediate release tablet; Take 1 Tablet by mouth every four hours as needed for Moderate Pain (Refill #2 of #3) for up to 30 days.  Dispense: 90 Tablet; Refill: 0  - oxyCODONE immediate release (ROXICODONE) 10 MG immediate release tablet; Take 1 Tablet by mouth every 6 hours as needed for Moderate Pain (Refill #3 of #3) for up to 30 days.  Dispense: 90 Tablet; Refill: 0    2. Gastroesophageal reflux disease without esophagitis  Urine drug screen is current  - oxyCODONE immediate release (ROXICODONE) 10 MG immediate release tablet; Take 1 Tablet by mouth every 6 hours as needed for Moderate Pain (refill #1 of #3) for up to 30 days.  Dispense: 90 Tablet; Refill: 0  - oxyCODONE immediate release (ROXICODONE) 10 MG immediate release tablet; Take 1 Tablet by mouth every four hours as needed for Moderate Pain (Refill #2 of #3) for up to 30 days.  Dispense: 90 Tablet; Refill: 0  - oxyCODONE immediate release (ROXICODONE) 10 MG immediate release tablet; Take 1 Tablet by mouth every 6 hours as needed for Moderate Pain (Refill #3 of #3) for up to 30 days.  Dispense: 90 Tablet; Refill: 0    3. Diarrhea, unspecified type  No change in medical therapy  - oxyCODONE immediate release (ROXICODONE) 10 MG immediate release tablet; Take 1 Tablet by mouth every 6 hours as needed for Moderate Pain (refill #1 of #3) for up to 30 days.  Dispense: 90 Tablet; Refill: 0  - oxyCODONE immediate release (ROXICODONE) 10 MG immediate release tablet; Take 1 Tablet by mouth every four hours as needed for Moderate Pain (Refill #2 of #3) for up to 30 days.  Dispense: 90 Tablet; Refill: 0  - oxyCODONE immediate release (ROXICODONE) 10 MG immediate release tablet; Take 1 Tablet by mouth every 6 hours as needed for Moderate Pain (Refill #3 of #3) for up to 30 days.  Dispense: 90 Tablet; Refill:  0    4. Marital disruption involving estrangement  Patient is being followed by Dr. [facility].       md [facility]. [facility]. [phone]

## 2024-09-16 ENCOUNTER — APPOINTMENT (RX ONLY)
Dept: URBAN - METROPOLITAN AREA CLINIC 36 | Facility: CLINIC | Age: 69
Setting detail: DERMATOLOGY
End: 2024-09-16

## 2024-09-16 DIAGNOSIS — Z41.9 ENCOUNTER FOR PROCEDURE FOR PURPOSES OTHER THAN REMEDYING HEALTH STATE, UNSPECIFIED: ICD-10-CM

## 2024-09-16 DIAGNOSIS — Z48.817 ENCOUNTER FOR SURGICAL AFTERCARE FOLLOWING SURGERY ON THE SKIN AND SUBCUTANEOUS TISSUE: ICD-10-CM

## 2024-09-16 PROCEDURE — ? CO2RE

## 2024-09-16 PROCEDURE — ? POST-OP WOUND CHECK

## 2024-09-16 ASSESSMENT — LOCATION ZONE DERM: LOCATION ZONE: NOSE

## 2024-09-16 ASSESSMENT — LOCATION SIMPLE DESCRIPTION DERM: LOCATION SIMPLE: LEFT NOSE

## 2024-09-16 ASSESSMENT — LOCATION DETAILED DESCRIPTION DERM: LOCATION DETAILED: LEFT NASAL ALA

## 2024-09-16 NOTE — PROCEDURE: CO2RE
Energy (Mj): 80
Was Eye Protection Used?: No
External Cooling Fan Speed: 5
Laser Mode: Deep
Core Energy (Mj): 70
Eye Protection Text: A corneal shield was inserted after appropriate application of topical anesthesia.
Other Location: left distal pretibial region
Detail Level: Simple
Treatment Number: 1
Consent: Written consent obtained, risks reviewed including but not limited to crusting, scabbing, blistering, scarring, darker or lighter pigmentary change, incomplete improvement of dyschromia, wrinkles, prolonged erythema and facial swelling, infection and bleeding.
Anesthesia Type: 1% lidocaine with epinephrine
Laser Mode: Light
Energy (Mj): 75
Other Location: scalp
Price (Use Numbers Only, No Special Characters Or $): 0
Post-Care Instructions: I reviewed with the patient in detail post-care instructions. Patient should avoid sun until area fully healed. Pt should apply vaseline to treated areas, and remove crusts gently with water-vinegar soaks.

## 2024-09-16 NOTE — PROCEDURE: POST-OP WOUND CHECK
Detail Level: Detailed
Add 69654 Cpt? (Important Note: In 2017 The Use Of 05524 Is Being Tracked By Cms To Determine Future Global Period Reimbursement For Global Periods): no

## 2024-09-19 RX ORDER — TRAZODONE HYDROCHLORIDE 50 MG/1
50 TABLET, FILM COATED ORAL EVERY EVENING
Qty: 90 TABLET | Refills: 3 | Status: SHIPPED | OUTPATIENT
Start: 2024-09-19

## 2024-09-19 NOTE — TELEPHONE ENCOUNTER
Received request via: Pharmacy    Was the patient seen in the last year in this department? Yes    Does the patient have an active prescription (recently filled or refills available) for medication(s) requested? No    Pharmacy Name:   [id number] - [place], [place] - [address]  [address] [facility] [place] [address]  Phone: [phone] Fax: [phone]        Does the patient have longterm Plus and need 100-day supply? (This applies to ALL medications) Patient does not have SCP

## 2024-09-24 ENCOUNTER — APPOINTMENT (RX ONLY)
Dept: URBAN - METROPOLITAN AREA CLINIC 20 | Facility: CLINIC | Age: 69
Setting detail: DERMATOLOGY
End: 2024-09-24

## 2024-09-24 DIAGNOSIS — L81.4 OTHER MELANIN HYPERPIGMENTATION: ICD-10-CM

## 2024-09-24 DIAGNOSIS — Z85.828 PERSONAL HISTORY OF OTHER MALIGNANT NEOPLASM OF SKIN: ICD-10-CM

## 2024-09-24 DIAGNOSIS — D18.0 HEMANGIOMA: ICD-10-CM

## 2024-09-24 DIAGNOSIS — D22 MELANOCYTIC NEVI: ICD-10-CM

## 2024-09-24 DIAGNOSIS — L82.1 OTHER SEBORRHEIC KERATOSIS: ICD-10-CM

## 2024-09-24 DIAGNOSIS — L71.8 OTHER ROSACEA: ICD-10-CM

## 2024-09-24 DIAGNOSIS — Z71.89 OTHER SPECIFIED COUNSELING: ICD-10-CM

## 2024-09-24 DIAGNOSIS — L57.0 ACTINIC KERATOSIS: ICD-10-CM

## 2024-09-24 DIAGNOSIS — L82.0 INFLAMED SEBORRHEIC KERATOSIS: ICD-10-CM

## 2024-09-24 PROBLEM — D18.01 HEMANGIOMA OF SKIN AND SUBCUTANEOUS TISSUE: Status: ACTIVE | Noted: 2024-09-24

## 2024-09-24 PROBLEM — D22.39 MELANOCYTIC NEVI OF OTHER PARTS OF FACE: Status: ACTIVE | Noted: 2024-09-24

## 2024-09-24 PROBLEM — D22.72 MELANOCYTIC NEVI OF LEFT LOWER LIMB, INCLUDING HIP: Status: ACTIVE | Noted: 2024-09-24

## 2024-09-24 PROBLEM — D22.5 MELANOCYTIC NEVI OF TRUNK: Status: ACTIVE | Noted: 2024-09-24

## 2024-09-24 PROBLEM — D22.71 MELANOCYTIC NEVI OF RIGHT LOWER LIMB, INCLUDING HIP: Status: ACTIVE | Noted: 2024-09-24

## 2024-09-24 PROBLEM — D22.62 MELANOCYTIC NEVI OF LEFT UPPER LIMB, INCLUDING SHOULDER: Status: ACTIVE | Noted: 2024-09-24

## 2024-09-24 PROBLEM — D22.61 MELANOCYTIC NEVI OF RIGHT UPPER LIMB, INCLUDING SHOULDER: Status: ACTIVE | Noted: 2024-09-24

## 2024-09-24 PROCEDURE — 17003 DESTRUCT PREMALG LES 2-14: CPT | Mod: 59,79

## 2024-09-24 PROCEDURE — ? PRESCRIPTION

## 2024-09-24 PROCEDURE — ? MEDICATION COUNSELING

## 2024-09-24 PROCEDURE — ? LIQUID NITROGEN

## 2024-09-24 PROCEDURE — ? SUNSCREEN RECOMMENDATIONS

## 2024-09-24 PROCEDURE — 17110 DESTRUCTION B9 LES UP TO 14: CPT | Mod: 79

## 2024-09-24 PROCEDURE — 99214 OFFICE O/P EST MOD 30 MIN: CPT | Mod: 25,24

## 2024-09-24 PROCEDURE — 17000 DESTRUCT PREMALG LESION: CPT | Mod: 59,79

## 2024-09-24 PROCEDURE — ? ADDITIONAL NOTES

## 2024-09-24 PROCEDURE — ? COUNSELING

## 2024-09-24 RX ORDER — AZELAIC ACID 0.15 G/G
GEL TOPICAL BID
Qty: 50 | Refills: 3 | Status: ERX | COMMUNITY
Start: 2024-09-24

## 2024-09-24 RX ADMIN — AZELAIC ACID: 0.15 GEL TOPICAL at 00:00

## 2024-09-24 ASSESSMENT — LOCATION ZONE DERM
LOCATION ZONE: HAND
LOCATION ZONE: LEG
LOCATION ZONE: TRUNK
LOCATION ZONE: FACE
LOCATION ZONE: ARM
LOCATION ZONE: SCALP
LOCATION ZONE: NOSE

## 2024-09-24 ASSESSMENT — LOCATION SIMPLE DESCRIPTION DERM
LOCATION SIMPLE: RIGHT UPPER BACK
LOCATION SIMPLE: RIGHT CHEEK
LOCATION SIMPLE: RIGHT POSTERIOR THIGH
LOCATION SIMPLE: LEFT NOSE
LOCATION SIMPLE: LEFT POSTERIOR UPPER ARM
LOCATION SIMPLE: RIGHT FOREHEAD
LOCATION SIMPLE: LEFT FOREARM
LOCATION SIMPLE: UPPER BACK
LOCATION SIMPLE: SCALP
LOCATION SIMPLE: LEFT PRETIBIAL REGION
LOCATION SIMPLE: RIGHT HAND
LOCATION SIMPLE: RIGHT PRETIBIAL REGION
LOCATION SIMPLE: LEFT POSTERIOR THIGH
LOCATION SIMPLE: LEFT CHEEK
LOCATION SIMPLE: RIGHT FOREARM
LOCATION SIMPLE: RIGHT LOWER BACK
LOCATION SIMPLE: LEFT UPPER BACK
LOCATION SIMPLE: RIGHT POSTERIOR UPPER ARM
LOCATION SIMPLE: LOWER BACK

## 2024-09-24 ASSESSMENT — LOCATION DETAILED DESCRIPTION DERM
LOCATION DETAILED: RIGHT DISTAL POSTERIOR UPPER ARM
LOCATION DETAILED: RIGHT INFERIOR MEDIAL UPPER BACK
LOCATION DETAILED: LEFT LATERAL PROXIMAL PRETIBIAL REGION
LOCATION DETAILED: RIGHT RADIAL DORSAL HAND
LOCATION DETAILED: RIGHT INFERIOR FOREHEAD
LOCATION DETAILED: LEFT INFERIOR MEDIAL MALAR CHEEK
LOCATION DETAILED: RIGHT SUPERIOR UPPER BACK
LOCATION DETAILED: RIGHT DISTAL POSTERIOR THIGH
LOCATION DETAILED: LEFT CENTRAL MALAR CHEEK
LOCATION DETAILED: LEFT INFERIOR UPPER BACK
LOCATION DETAILED: LEFT VENTRAL PROXIMAL FOREARM
LOCATION DETAILED: INFERIOR THORACIC SPINE
LOCATION DETAILED: RIGHT INFERIOR MEDIAL MIDBACK
LOCATION DETAILED: LEFT DISTAL POSTERIOR THIGH
LOCATION DETAILED: LEFT SUPERIOR MEDIAL MALAR CHEEK
LOCATION DETAILED: LEFT PROXIMAL POSTERIOR UPPER ARM
LOCATION DETAILED: LEFT NASAL ALA
LOCATION DETAILED: SUPERIOR LUMBAR SPINE
LOCATION DETAILED: RIGHT SUPERIOR PARIETAL SCALP
LOCATION DETAILED: RIGHT INFERIOR CENTRAL MALAR CHEEK
LOCATION DETAILED: LEFT INFERIOR MEDIAL UPPER BACK
LOCATION DETAILED: RIGHT PROXIMAL PRETIBIAL REGION
LOCATION DETAILED: RIGHT VENTRAL PROXIMAL FOREARM
LOCATION DETAILED: RIGHT CENTRAL MALAR CHEEK
LOCATION DETAILED: RIGHT INFERIOR UPPER BACK

## 2024-09-24 NOTE — PROCEDURE: MEDICATION COUNSELING
Zyclara Counseling:  I discussed with the patient the risks of imiquimod including but not limited to erythema, scaling, itching, weeping, crusting, and pain.  Patient understands that the inflammatory response to imiquimod is variable from person to person and was educated regarded proper titration schedule.  If flu-like symptoms develop, patient knows to discontinue the medication and contact us.
Hydroxyzine Pregnancy And Lactation Text: This medication is not safe during pregnancy and should not be taken. It is also excreted in breast milk and breast feeding isn't recommended.
Bimzelx Pregnancy And Lactation Text: This medication crosses the placenta and the safety is uncertain during pregnancy. It is unknown if this medication is present in breast milk.
Erivedge Counseling- I discussed with the patient the risks of Erivedge including but not limited to nausea, vomiting, diarrhea, constipation, weight loss, changes in the sense of taste, decreased appetite, muscle spasms, and hair loss.  The patient verbalized understanding of the proper use and possible adverse effects of Erivedge.  All of the patient's questions and concerns were addressed.
Methotrexate Counseling:  Patient counseled regarding adverse effects of methotrexate including but not limited to nausea, vomiting, abnormalities in liver function tests. Patients may develop mouth sores, rash, diarrhea, and abnormalities in blood counts. The patient understands that monitoring is required including LFT's and blood counts.  There is a rare possibility of scarring of the liver and lung problems that can occur when taking methotrexate. Persistent nausea, loss of appetite, pale stools, dark urine, cough, and shortness of breath should be reported immediately. Patient advised to discontinue methotrexate treatment at least three months before attempting to become pregnant.  I discussed the need for folate supplements while taking methotrexate.  These supplements can decrease side effects during methotrexate treatment. The patient verbalized understanding of the proper use and possible adverse effects of methotrexate.  All of the patient's questions and concerns were addressed.
Spironolactone Pregnancy And Lactation Text: This medication can cause feminization of the male fetus and should be avoided during pregnancy. The active metabolite is also found in breast milk.
Colchicine Counseling:  Patient counseled regarding adverse effects including but not limited to stomach upset (nausea, vomiting, stomach pain, or diarrhea).  Patient instructed to limit alcohol consumption while taking this medication.  Colchicine may reduce blood counts especially with prolonged use.  The patient understands that monitoring of kidney function and blood counts may be required, especially at baseline. The patient verbalized understanding of the proper use and possible adverse effects of colchicine.  All of the patient's questions and concerns were addressed.
Mirvaso Pregnancy And Lactation Text: This medication has not been assigned a Pregnancy Risk Category. It is unknown if the medication is excreted in breast milk.
Sarecycline Counseling: Patient advised regarding possible photosensitivity and discoloration of the teeth, skin, lips, tongue and gums.  Patient instructed to avoid sunlight, if possible.  When exposed to sunlight, patients should wear protective clothing, sunglasses, and sunscreen.  The patient was instructed to call the office immediately if the following severe adverse effects occur:  hearing changes, easy bruising/bleeding, severe headache, or vision changes.  The patient verbalized understanding of the proper use and possible adverse effects of sarecycline.  All of the patient's questions and concerns were addressed.
Acitretin Counseling:  I discussed with the patient the risks of acitretin including but not limited to hair loss, dry lips/skin/eyes, liver damage, hyperlipidemia, depression/suicidal ideation, photosensitivity.  Serious rare side effects can include but are not limited to pancreatitis, pseudotumor cerebri, bony changes, clot formation/stroke/heart attack.  Patient understands that alcohol is contraindicated since it can result in liver toxicity and significantly prolong the elimination of the drug by many years.
Rhofade Counseling: Rhofade is a topical medication which can decrease superficial blood flow where applied. Side effects are uncommon and include stinging, redness and allergic reactions.
Spevigo Pregnancy And Lactation Text: The risk during pregnancy and breastfeeding is uncertain with this medication. This medication does cross the placenta. It is unknown if this medication is found in breast milk.
Oxybutynin Counseling:  I discussed with the patient the risks of oxybutynin including but not limited to skin rash, drowsiness, dry mouth, difficulty urinating, and blurred vision.
Finasteride Male Counseling: Finasteride Counseling:  I discussed with the patient the risks of use of finasteride including but not limited to decreased libido, decreased ejaculate volume, gynecomastia, and depression. Women should not handle medication.  All of the patient's questions and concerns were addressed.
Eucrisa Counseling: Patient may experience a mild burning sensation during topical application. Eucrisa is not approved in children less than 2 years of age.
Olanzapine Counseling- I discussed with the patient the common side effects of olanzapine including but are not limited to: lack of energy, dry mouth, increased appetite, sleepiness, tremor, constipation, dizziness, changes in behavior, or restlessness.  Explained that teenagers are more likely to experience headaches, abdominal pain, pain in the arms or legs, tiredness, and sleepiness.  Serious side effects include but are not limited: increased risk of death in elderly patients who are confused, have memory loss, or dementia-related psychosis; hyperglycemia; increased cholesterol and triglycerides; and weight gain.
Stelara Counseling:  I discussed with the patient the risks of ustekinumab including but not limited to immunosuppression, malignancy, posterior leukoencephalopathy syndrome, and serious infections.  The patient understands that monitoring is required including a PPD at baseline and must alert us or the primary physician if symptoms of infection or other concerning signs are noted.
Olumiant Counseling: I discussed with the patient the risks of Olumiant therapy including but not limited to upper respiratory tract infections, shingles, cold sores, and nausea. Live vaccines should be avoided.  This medication has been linked to serious infections; higher rate of mortality; malignancy and lymphoproliferative disorders; major adverse cardiovascular events; thrombosis; gastrointestinal perforations; neutropenia; lymphopenia; anemia; liver enzyme elevations; and lipid elevations.
Rituxan Pregnancy And Lactation Text: This medication is Pregnancy Category C and it isn't know if it is safe during pregnancy. It is unknown if this medication is excreted in breast milk but similar antibodies are known to be excreted.
Topical Clindamycin Counseling: Patient counseled that this medication may cause skin irritation or allergic reactions.  In the event of skin irritation, the patient was advised to reduce the amount of the drug applied or use it less frequently.   The patient verbalized understanding of the proper use and possible adverse effects of clindamycin.  All of the patient's questions and concerns were addressed.
Tranexamic Acid Pregnancy And Lactation Text: It is unknown if this medication is safe during pregnancy or breast feeding.
Itraconazole Counseling:  I discussed with the patient the risks of itraconazole including but not limited to liver damage, nausea/vomiting, neuropathy, and severe allergy.  The patient understands that this medication is best absorbed when taken with acidic beverages such as non-diet cola or ginger ale.  The patient understands that monitoring is required including baseline LFTs and repeat LFTs at intervals.  The patient understands that they are to contact us or the primary physician if concerning signs are noted.
Calcipotriene Counseling:  I discussed with the patient the risks of calcipotriene including but not limited to erythema, scaling, itching, and irritation.
Topical Sulfur Applications Pregnancy And Lactation Text: This medication is Pregnancy Category C and has an unknown safety profile during pregnancy. It is unknown if this topical medication is excreted in breast milk.
Hydroxychloroquine Pregnancy And Lactation Text: This medication has been shown to cause fetal harm but it isn't assigned a Pregnancy Risk Category. There are small amounts excreted in breast milk.
Bactrim Pregnancy And Lactation Text: This medication is Pregnancy Category D and is known to cause fetal risk.  It is also excreted in breast milk.
Erythromycin Pregnancy And Lactation Text: This medication is Pregnancy Category B and is considered safe during pregnancy. It is also excreted in breast milk.
Humira Counseling:  I discussed with the patient the risks of adalimumab including but not limited to myelosuppression, immunosuppression, autoimmune hepatitis, demyelinating diseases, lymphoma, and serious infections.  The patient understands that monitoring is required including a PPD at baseline and must alert us or the primary physician if symptoms of infection or other concerning signs are noted.
Wartpeel Counseling:  I discussed with the patient the risks of Wartpeel including but not limited to erythema, scaling, itching, weeping, crusting, and pain.
Sarecycline Pregnancy And Lactation Text: This medication is Pregnancy Category D and not consider safe during pregnancy. It is also excreted in breast milk.
Low Dose Naltrexone Counseling- I discussed with the patient the potential risks and side effects of low dose naltrexone including but not limited to: more vivid dreams, headaches, nausea, vomiting, abdominal pain, fatigue, dizziness, and anxiety.
Methotrexate Pregnancy And Lactation Text: This medication is Pregnancy Category X and is known to cause fetal harm. This medication is excreted in breast milk.
Acitretin Pregnancy And Lactation Text: This medication is Pregnancy Category X and should not be given to women who are pregnant or may become pregnant in the future. This medication is excreted in breast milk.
Cimzia Counseling:  I discussed with the patient the risks of Cimzia including but not limited to immunosuppression, allergic reactions and infections.  The patient understands that monitoring is required including a PPD at baseline and must alert us or the primary physician if symptoms of infection or other concerning signs are noted.
Cephalexin Counseling: I counseled the patient regarding use of cephalexin as an antibiotic for prophylactic and/or therapeutic purposes. Cephalexin (commonly prescribed under brand name Keflex) is a cephalosporin antibiotic which is active against numerous classes of bacteria, including most skin bacteria. Side effects may include nausea, diarrhea, gastrointestinal upset, rash, hives, yeast infections, and in rare cases, hepatitis, kidney disease, seizures, fever, confusion, neurologic symptoms, and others. Patients with severe allergies to penicillin medications are cautioned that there is about a 10% incidence of cross-reactivity with cephalosporins. When possible, patients with penicillin allergies should use alternatives to cephalosporins for antibiotic therapy.
Erivedge Pregnancy And Lactation Text: This medication is Pregnancy Category X and is absolutely contraindicated during pregnancy. It is unknown if it is excreted in breast milk.
Zyclara Pregnancy And Lactation Text: This medication is Pregnancy Category C. It is unknown if this medication is excreted in breast milk.
Opzelura Counseling:  I discussed with the patient the risks of Opzelura including but not limited to nasopharngitis, bronchitis, ear infection, eosinophila, hives, diarrhea, folliculitis, tonsillitis, and rhinorrhea.  Taken orally, this medication has been linked to serious infections; higher rate of mortality; malignancy and lymphoproliferative disorders; major adverse cardiovascular events; thrombosis; thrombocytopenia, anemia, and neutropenia; and lipid elevations.
Propranolol Counseling:  I discussed with the patient the risks of propranolol including but not limited to low heart rate, low blood pressure, low blood sugar, restlessness and increased cold sensitivity. They should call the office if they experience any of these side effects.
Aklief Pregnancy And Lactation Text: It is unknown if this medication is safe to use during pregnancy.  It is unknown if this medication is excreted in breast milk.  Breastfeeding women should use the topical cream on the smallest area of the skin for the shortest time needed while breastfeeding.  Do not apply to nipple and areola.
Olumiant Pregnancy And Lactation Text: Based on animal studies, Olumiant may cause embryo-fetal harm when administered to pregnant women.  The medication should not be used in pregnancy.  Breastfeeding is not recommended during treatment.
Aklief counseling:  Patient advised to apply a pea-sized amount only at bedtime and wait 30 minutes after washing their face before applying.  If too drying, patient may add a non-comedogenic moisturizer.  The most commonly reported side effects including irritation, redness, scaling, dryness, stinging, burning, itching, and increased risk of sunburn.  The patient verbalized understanding of the proper use and possible adverse effects of retinoids.  All of the patient's questions and concerns were addressed.
Albendazole Counseling:  I discussed with the patient the risks of albendazole including but not limited to cytopenia, kidney damage, nausea/vomiting and severe allergy.  The patient understands that this medication is being used in an off-label manner.
Oxybutynin Pregnancy And Lactation Text: This medication is Pregnancy Category B and is considered safe during pregnancy. It is unknown if it is excreted in breast milk.
Opzelura Pregnancy And Lactation Text: There is insufficient data to evaluate drug-associated risk for major birth defects, miscarriage, or other adverse maternal or fetal outcomes.  There is a pregnancy registry that monitors pregnancy outcomes in pregnant persons exposed to the medication during pregnancy.  It is unknown if this medication is excreted in breast milk.  Do not breastfeed during treatment and for about 4 weeks after the last dose.
Calcipotriene Pregnancy And Lactation Text: The use of this medication during pregnancy or lactation is not recommended as there is insufficient data.
Valtrex Counseling: I discussed with the patient the risks of valacyclovir including but not limited to kidney damage, nausea, vomiting and severe allergy.  The patient understands that if the infection seems to be worsening or is not improving, they are to call.
Itraconazole Pregnancy And Lactation Text: This medication is Pregnancy Category C and it isn't know if it is safe during pregnancy. It is also excreted in breast milk.
Siliq Counseling:  I discussed with the patient the risks of Siliq including but not limited to new or worsening depression, suicidal thoughts and behavior, immunosuppression, malignancy, posterior leukoencephalopathy syndrome, and serious infections.  The patient understands that monitoring is required including a PPD at baseline and must alert us or the primary physician if symptoms of infection or other concerning signs are noted. There is also a special program designed to monitor depression which is required with Siliq.
Eucrisa Pregnancy And Lactation Text: This medication has not been assigned a Pregnancy Risk Category but animal studies failed to show danger with the topical medication. It is unknown if the medication is excreted in breast milk.
Solaraze Counseling:  I discussed with the patient the risks of Solaraze including but not limited to erythema, scaling, itching, weeping, crusting, and pain.
Humira Pregnancy And Lactation Text: This medication is Pregnancy Category B and is considered safe during pregnancy. It is unknown if this medication is excreted in breast milk.
Finasteride Female Counseling: Finasteride Counseling:  I discussed with the patient the risks of use of finasteride including but not limited to decreased libido and sexual dysfunction. Explained the teratogenic nature of the medication and stressed the importance of not getting pregnant during treatment. All of the patient's questions and concerns were addressed.
Metronidazole Counseling:  I discussed with the patient the risks of metronidazole including but not limited to seizures, nausea/vomiting, a metallic taste in the mouth, nausea/vomiting and severe allergy.
Azathioprine Counseling:  I discussed with the patient the risks of azathioprine including but not limited to myelosuppression, immunosuppression, hepatotoxicity, lymphoma, and infections.  The patient understands that monitoring is required including baseline LFTs, Creatinine, possible TPMP genotyping and weekly CBCs for the first month and then every 2 weeks thereafter.  The patient verbalized understanding of the proper use and possible adverse effects of azathioprine.  All of the patient's questions and concerns were addressed.
Colchicine Pregnancy And Lactation Text: This medication is Pregnancy Category C and isn't considered safe during pregnancy. It is excreted in breast milk.
Olanzapine Pregnancy And Lactation Text: This medication is pregnancy category C.   There are no adequate and well controlled trials with olanzapine in pregnant females.  Olanzapine should be used during pregnancy only if the potential benefit justifies the potential risk to the fetus.   In a study in lactating healthy women, olanzapine was excreted in breast milk.  It is recommended that women taking olanzapine should not breast feed.
Taltz Counseling: I discussed with the patient the risks of ixekizumab including but not limited to immunosuppression, serious infections, worsening of inflammatory bowel disease and drug reactions.  The patient understands that monitoring is required including a PPD at baseline and must alert us or the primary physician if symptoms of infection or other concerning signs are noted.
Cephalexin Pregnancy And Lactation Text: This medication is Pregnancy Category B and considered safe during pregnancy.  It is also excreted in breast milk but can be used safely for shorter doses.
Topical Ketoconazole Counseling: Patient counseled that this medication may cause skin irritation or allergic reactions.  In the event of skin irritation, the patient was advised to reduce the amount of the drug applied or use it less frequently.   The patient verbalized understanding of the proper use and possible adverse effects of ketoconazole.  All of the patient's questions and concerns were addressed.
Cantharidin Pregnancy And Lactation Text: This medication has not been proven safe during pregnancy. It is unknown if this medication is excreted in breast milk.
Tetracycline Counseling: Patient counseled regarding possible photosensitivity and increased risk for sunburn.  Patient instructed to avoid sunlight, if possible.  When exposed to sunlight, patients should wear protective clothing, sunglasses, and sunscreen.  The patient was instructed to call the office immediately if the following severe adverse effects occur:  hearing changes, easy bruising/bleeding, severe headache, or vision changes.  The patient verbalized understanding of the proper use and possible adverse effects of tetracycline.  All of the patient's questions and concerns were addressed. Patient understands to avoid pregnancy while on therapy due to potential birth defects.
Azathioprine Pregnancy And Lactation Text: This medication is Pregnancy Category D and isn't considered safe during pregnancy. It is unknown if this medication is excreted in breast milk.
Dapsone Counseling: I discussed with the patient the risks of dapsone including but not limited to hemolytic anemia, agranulocytosis, rashes, methemoglobinemia, kidney failure, peripheral neuropathy, headaches, GI upset, and liver toxicity.  Patients who start dapsone require monitoring including baseline LFTs and weekly CBCs for the first month, then every month thereafter.  The patient verbalized understanding of the proper use and possible adverse effects of dapsone.  All of the patient's questions and concerns were addressed.
Cimzia Pregnancy And Lactation Text: This medication crosses the placenta but can be considered safe in certain situations. Cimzia may be excreted in breast milk.
Wartpeel Pregnancy And Lactation Text: This medication is Pregnancy Category X and contraindicated in pregnancy and in women who may become pregnant. It is unknown if this medication is excreted in breast milk.
Azelaic Acid Counseling: Patient counseled that medicine may cause skin irritation and to avoid applying near the eyes.  In the event of skin irritation, the patient was advised to reduce the amount of the drug applied or use it less frequently.   The patient verbalized understanding of the proper use and possible adverse effects of azelaic acid.  All of the patient's questions and concerns were addressed.
Low Dose Naltrexone Pregnancy And Lactation Text: Naltrexone is pregnancy category C.  There have been no adequate and well-controlled studies in pregnant women.  It should be used in pregnancy only if the potential benefit justifies the potential risk to the fetus.   Limited data indicates that naltrexone is minimally excreted into breastmilk.
Libtayo Counseling- I discussed with the patient the risks of Libtayo including but not limited to nausea, vomiting, diarrhea, and bone or muscle pain.  The patient verbalized understanding of the proper use and possible adverse effects of Libtayo.  All of the patient's questions and concerns were addressed.
Propranolol Pregnancy And Lactation Text: This medication is Pregnancy Category C and it isn't known if it is safe during pregnancy. It is excreted in breast milk.
5-Fu Counseling: 5-Fluorouracil Counseling:  I discussed with the patient the risks of 5-fluorouracil including but not limited to erythema, scaling, itching, weeping, crusting, and pain.
Use Enhanced Medication Counseling?: No
Ketoconazole Counseling:   Patient counseled regarding improving absorption with orange juice.  Adverse effects include but are not limited to breast enlargement, headache, diarrhea, nausea, upset stomach, liver function test abnormalities, taste disturbance, and stomach pain.  There is a rare possibility of liver failure that can occur when taking ketoconazole. The patient understands that monitoring of LFTs may be required, especially at baseline. The patient verbalized understanding of the proper use and possible adverse effects of ketoconazole.  All of the patient's questions and concerns were addressed.
Libtayo Pregnancy And Lactation Text: This medication is contraindicated in pregnancy and when breast feeding.
Hydroquinone Counseling:  Patient advised that medication may result in skin irritation, lightening (hypopigmentation), dryness, and burning.  In the event of skin irritation, the patient was advised to reduce the amount of the drug applied or use it less frequently.  Rarely, spots that are treated with hydroquinone can become darker (pseudoochronosis).  Should this occur, patient instructed to stop medication and call the office. The patient verbalized understanding of the proper use and possible adverse effects of hydroquinone.  All of the patient's questions and concerns were addressed.
Valtrex Pregnancy And Lactation Text: this medication is Pregnancy Category B and is considered safe during pregnancy. This medication is not directly found in breast milk but it's metabolite acyclovir is present.
Cantharidin Counseling:  I discussed with the patient the risks of Cantharidin including but not limited to pain, redness, burning, itching, and blistering.
Picato Counseling:  I discussed with the patient the risks of Picato including but not limited to erythema, scaling, itching, weeping, crusting, and pain.
Hyrimoz Counseling:  I discussed with the patient the risks of adalimumab including but not limited to myelosuppression, immunosuppression, autoimmune hepatitis, demyelinating diseases, lymphoma, and serious infections.  The patient understands that monitoring is required including a PPD at baseline and must alert us or the primary physician if symptoms of infection or other concerning signs are noted.
Albendazole Pregnancy And Lactation Text: This medication is Pregnancy Category C and it isn't known if it is safe during pregnancy. It is also excreted in breast milk.
Metronidazole Pregnancy And Lactation Text: This medication is Pregnancy Category B and considered safe during pregnancy.  It is also excreted in breast milk.
Siliq Pregnancy And Lactation Text: The risk during pregnancy and breastfeeding is uncertain with this medication.
Terbinafine Pregnancy And Lactation Text: This medication is Pregnancy Category B and is considered safe during pregnancy. It is also excreted in breast milk and breast feeding isn't recommended.
Finasteride Pregnancy And Lactation Text: This medication is absolutely contraindicated during pregnancy. It is unknown if it is excreted in breast milk.
Solaraze Pregnancy And Lactation Text: This medication is Pregnancy Category B and is considered safe. There is some data to suggest avoiding during the third trimester. It is unknown if this medication is excreted in breast milk.
Prednisone Counseling:  I discussed with the patient the risks of prolonged use of prednisone including but not limited to weight gain, insomnia, osteoporosis, mood changes, diabetes, susceptibility to infection, glaucoma and high blood pressure.  In cases where prednisone use is prolonged, patients should be monitored with blood pressure checks, serum glucose levels and an eye exam.  Additionally, the patient may need to be placed on GI prophylaxis, PCP prophylaxis, and calcium and vitamin D supplementation and/or a bisphosphonate.  The patient verbalized understanding of the proper use and the possible adverse effects of prednisone.  All of the patient's questions and concerns were addressed.
Opioid Counseling: I discussed with the patient the potential side effects of opioids including but not limited to addiction, altered mental status, and depression. I stressed avoiding alcohol, benzodiazepines, muscle relaxants and sleep aids unless specifically okayed by a physician. The patient verbalized understanding of the proper use and possible adverse effects of opioids. All of the patient's questions and concerns were addressed. They were instructed to flush the remaining pills down the toilet if they did not need them for pain.
Bexarotene Counseling:  I discussed with the patient the risks of bexarotene including but not limited to hair loss, dry lips/skin/eyes, liver abnormalities, hyperlipidemia, pancreatitis, depression/suicidal ideation, photosensitivity, drug rash/allergic reactions, hypothyroidism, anemia, leukopenia, infection, cataracts, and teratogenicity.  Patient understands that they will need regular blood tests to check lipid profile, liver function tests, white blood cell count, thyroid function tests and pregnancy test if applicable.
Simponi Counseling:  I discussed with the patient the risks of golimumab including but not limited to myelosuppression, immunosuppression, autoimmune hepatitis, demyelinating diseases, lymphoma, and serious infections.  The patient understands that monitoring is required including a PPD at baseline and must alert us or the primary physician if symptoms of infection or other concerning signs are noted.
Clindamycin Counseling: I counseled the patient regarding use of clindamycin as an antibiotic for prophylactic and/or therapeutic purposes. Clindamycin is active against numerous classes of bacteria, including skin bacteria. Side effects may include nausea, diarrhea, gastrointestinal upset, rash, hives, yeast infections, and in rare cases, colitis.
Cellcept Counseling:  I discussed with the patient the risks of mycophenolate mofetil including but not limited to infection/immunosuppression, GI upset, hypokalemia, hypercholesterolemia, bone marrow suppression, lymphoproliferative disorders, malignancy, GI ulceration/bleed/perforation, colitis, interstitial lung disease, kidney failure, progressive multifocal leukoencephalopathy, and birth defects.  The patient understands that monitoring is required including a baseline creatinine and regular CBC testing. In addition, patient must alert us immediately if symptoms of infection or other concerning signs are noted.
Dapsone Pregnancy And Lactation Text: This medication is Pregnancy Category C and is not considered safe during pregnancy or breast feeding.
Azelaic Acid Pregnancy And Lactation Text: This medication is considered safe during pregnancy and breast feeding.
Soolantra Counseling: I discussed with the patients the risks of topial Soolantra. This is a medicine which decreases the number of mites and inflammation in the skin. You experience burning, stinging, eye irritation or allergic reactions.  Please call our office if you develop any problems from using this medication.
Imiquimod Counseling:  I discussed with the patient the risks of imiquimod including but not limited to erythema, scaling, itching, weeping, crusting, and pain.  Patient understands that the inflammatory response to imiquimod is variable from person to person and was educated regarded proper titration schedule.  If flu-like symptoms develop, patient knows to discontinue the medication and contact us.
Opioid Pregnancy And Lactation Text: These medications can lead to premature delivery and should be avoided during pregnancy. These medications are also present in breast milk in small amounts.
Niacinamide Counseling: I recommended taking niacin or niacinamide, also know as vitamin B3, twice daily. Recent evidence suggests that taking vitamin B3 (500 mg twice daily) can reduce the risk of actinic keratoses and non-melanoma skin cancers. Side effects of vitamin B3 include flushing and headache.
SSKI Counseling:  I discussed with the patient the risks of SSKI including but not limited to thyroid abnormalities, metallic taste, GI upset, fever, headache, acne, arthralgias, paraesthesias, lymphadenopathy, easy bleeding, arrhythmias, and allergic reaction.
Terbinafine Counseling: Patient counseling regarding adverse effects of terbinafine including but not limited to headache, diarrhea, rash, upset stomach, liver function test abnormalities, itching, taste/smell disturbance, nausea, abdominal pain, and flatulence.  There is a rare possibility of liver failure that can occur when taking terbinafine.  The patient understands that a baseline LFT and kidney function test may be required. The patient verbalized understanding of the proper use and possible adverse effects of terbinafine.  All of the patient's questions and concerns were addressed.
Cosentyx Counseling:  I discussed with the patient the risks of Cosentyx including but not limited to worsening of Crohn's disease, immunosuppression, allergic reactions and infections.  The patient understands that monitoring is required including a PPD at baseline and must alert us or the primary physician if symptoms of infection or other concerning signs are noted.
Rinvoq Pregnancy And Lactation Text: Based on animal studies, Rinvoq may cause embryo-fetal harm when administered to pregnant women.  The medication should not be used in pregnancy.  Breastfeeding is not recommended during treatment and for 6 days after the last dose.
Winlevi Counseling:  I discussed with the patient the risks of topical clascoterone including but not limited to erythema, scaling, itching, and stinging. Patient voiced their understanding.
Azithromycin Counseling:  I discussed with the patient the risks of azithromycin including but not limited to GI upset, allergic reaction, drug rash, diarrhea, and yeast infections.
Odomzo Counseling- I discussed with the patient the risks of Odomzo including but not limited to nausea, vomiting, diarrhea, constipation, weight loss, changes in the sense of taste, decreased appetite, muscle spasms, and hair loss.  The patient verbalized understanding of the proper use and possible adverse effects of Odomzo.  All of the patient's questions and concerns were addressed.
Ketoconazole Pregnancy And Lactation Text: This medication is Pregnancy Category C and it isn't know if it is safe during pregnancy. It is also excreted in breast milk and breast feeding isn't recommended.
Rinvoq Counseling: I discussed with the patient the risks of Rinvoq therapy including but not limited to upper respiratory tract infections, shingles, cold sores, bronchitis, nausea, cough, fever, acne, and headache. Live vaccines should be avoided.  This medication has been linked to serious infections; higher rate of mortality; malignancy and lymphoproliferative disorders; major adverse cardiovascular events; thrombosis; thrombocytopenia, anemia, and neutropenia; lipid elevations; liver enzyme elevations; and gastrointestinal perforations.
Minocycline Counseling: Patient advised regarding possible photosensitivity and discoloration of the teeth, skin, lips, tongue and gums.  Patient instructed to avoid sunlight, if possible.  When exposed to sunlight, patients should wear protective clothing, sunglasses, and sunscreen.  The patient was instructed to call the office immediately if the following severe adverse effects occur:  hearing changes, easy bruising/bleeding, severe headache, or vision changes.  The patient verbalized understanding of the proper use and possible adverse effects of minocycline.  All of the patient's questions and concerns were addressed.
Oral Minoxidil Counseling- I discussed with the patient the risks of oral minoxidil including but not limited to shortness of breath, swelling of the feet or ankles, dizziness, lightheadedness, unwanted hair growth and allergic reaction.  The patient verbalized understanding of the proper use and possible adverse effects of oral minoxidil.  All of the patient's questions and concerns were addressed.
Prednisone Pregnancy And Lactation Text: This medication is Pregnancy Category C and it isn't know if it is safe during pregnancy. This medication is excreted in breast milk.
Ivermectin Counseling:  Patient instructed to take medication on an empty stomach with a full glass of water.  Patient informed of potential adverse effects including but not limited to nausea, diarrhea, dizziness, itching, and swelling of the extremities or lymph nodes.  The patient verbalized understanding of the proper use and possible adverse effects of ivermectin.  All of the patient's questions and concerns were addressed.
Bexarotene Pregnancy And Lactation Text: This medication is Pregnancy Category X and should not be given to women who are pregnant or may become pregnant. This medication should not be used if you are breast feeding.
Ilumya Counseling: I discussed with the patient the risks of tildrakizumab including but not limited to immunosuppression, malignancy, posterior leukoencephalopathy syndrome, and serious infections.  The patient understands that monitoring is required including a PPD at baseline and must alert us or the primary physician if symptoms of infection or other concerning signs are noted.
Soolantra Pregnancy And Lactation Text: This medication is Pregnancy Category C. This medication is considered safe during breast feeding.
Drysol Counseling:  I discussed with the patient the risks of drysol/aluminum chloride including but not limited to skin rash, itching, irritation, burning.
Klisyri Pregnancy And Lactation Text: It is unknown if this medication can harm a developing fetus or if it is excreted in breast milk.
Tremfya Counseling: I discussed with the patient the risks of guselkumab including but not limited to immunosuppression, serious infections, worsening of inflammatory bowel disease and drug reactions.  The patient understands that monitoring is required including a PPD at baseline and must alert us or the primary physician if symptoms of infection or other concerning signs are noted.
Cibinqo Counseling: I discussed with the patient the risks of Cibinqo therapy including but not limited to common cold, nausea, headache, cold sores, increased blood CPK levels, dizziness, UTIs, fatigue, acne, and vomitting. Live vaccines should be avoided.  This medication has been linked to serious infections; higher rate of mortality; malignancy and lymphoproliferative disorders; major adverse cardiovascular events; thrombosis; thrombocytopenia and lymphopenia; lipid elevations; and retinal detachment.
Sotyktu Counseling:  I discussed the most common side effects of Sotyktu including: common cold, sore throat, sinus infections, cold sores, canker sores, folliculitis, and acne.? I also discussed more serious side effects of Sotyktu including but not limited to: serious allergic reactions; increased risk for infections such as TB; cancers such as lymphomas; rhabdomyolysis and elevated CPK; and elevated triglycerides and liver enzymes.?
Topical Metronidazole Counseling: Metronidazole is a topical antibiotic medication. You may experience burning, stinging, redness, or allergic reactions.  Please call our office if you develop any problems from using this medication.
Gabapentin Counseling: I discussed with the patient the risks of gabapentin including but not limited to dizziness, somnolence, fatigue and ataxia.
Benzoyl Peroxide Counseling: Patient counseled that medicine may cause skin irritation and bleach clothing.  In the event of skin irritation, the patient was advised to reduce the amount of the drug applied or use it less frequently.   The patient verbalized understanding of the proper use and possible adverse effects of benzoyl peroxide.  All of the patient's questions and concerns were addressed.
Sski Pregnancy And Lactation Text: This medication is Pregnancy Category D and isn't considered safe during pregnancy. It is excreted in breast milk.
Fluconazole Counseling:  Patient counseled regarding adverse effects of fluconazole including but not limited to headache, diarrhea, nausea, upset stomach, liver function test abnormalities, taste disturbance, and stomach pain.  There is a rare possibility of liver failure that can occur when taking fluconazole.  The patient understands that monitoring of LFTs and kidney function test may be required, especially at baseline. The patient verbalized understanding of the proper use and possible adverse effects of fluconazole.  All of the patient's questions and concerns were addressed.
Winlevi Pregnancy And Lactation Text: This medication is considered safe during pregnancy and breastfeeding.
Cimetidine Counseling:  I discussed with the patient the risks of Cimetidine including but not limited to gynecomastia, headache, diarrhea, nausea, drowsiness, arrhythmias, pancreatitis, skin rashes, psychosis, bone marrow suppression and kidney toxicity.
VTAMA Counseling: I discussed with the patient that VTAMA is not for use in the eyes, mouth or mouth. They should call the office if they develop any signs of allergic reactions to VTAMA. The patient verbalized understanding of the proper use and possible adverse effects of VTAMA.  All of the patient's questions and concerns were addressed.
Isotretinoin Counseling: Patient should get monthly blood tests, not donate blood, not drive at night if vision affected, not share medication, and not undergo elective surgery for 6 months after tx completed. Side effects reviewed, pt to contact office should one occur.
Arava Counseling:  Patient counseled regarding adverse effects of Arava including but not limited to nausea, vomiting, abnormalities in liver function tests. Patients may develop mouth sores, rash, diarrhea, and abnormalities in blood counts. The patient understands that monitoring is required including LFTs and blood counts.  There is a rare possibility of scarring of the liver and lung problems that can occur when taking methotrexate. Persistent nausea, loss of appetite, pale stools, dark urine, cough, and shortness of breath should be reported immediately. Patient advised to discontinue Arava treatment and consult with a physician prior to attempting conception. The patient will have to undergo a treatment to eliminate Arava from the body prior to conception.
Clindamycin Pregnancy And Lactation Text: This medication can be used in pregnancy if certain situations. Clindamycin is also present in breast milk.
Protopic Counseling: Patient may experience a mild burning sensation during topical application. Protopic is not approved in children less than 2 years of age. There have been case reports of hematologic and skin malignancies in patients using topical calcineurin inhibitors although causality is questionable.
Cibinqo Pregnancy And Lactation Text: It is unknown if this medication will adversely affect pregnancy or breast feeding.  You should not take this medication if you are currently pregnant or planning a pregnancy or while breastfeeding.
Klisyri Counseling:  I discussed with the patient the risks of Klisyri including but not limited to erythema, scaling, itching, weeping, crusting, and pain.
Isotretinoin Pregnancy And Lactation Text: This medication is Pregnancy Category X and is considered extremely dangerous during pregnancy. It is unknown if it is excreted in breast milk.
Protopic Pregnancy And Lactation Text: This medication is Pregnancy Category C. It is unknown if this medication is excreted in breast milk when applied topically.
Oral Minoxidil Pregnancy And Lactation Text: This medication should only be used when clearly needed if you are pregnant, attempting to become pregnant or breast feeding.
Thalidomide Counseling: I discussed with the patient the risks of thalidomide including but not limited to birth defects, anxiety, weakness, chest pain, dizziness, cough and severe allergy.
Skyrizi Counseling: I discussed with the patient the risks of risankizumab-rzaa including but not limited to immunosuppression, and serious infections.  The patient understands that monitoring is required including a PPD at baseline and must alert us or the primary physician if symptoms of infection or other concerning signs are noted.
Topical Retinoid counseling:  Patient advised to apply a pea-sized amount only at bedtime and wait 30 minutes after washing their face before applying.  If too drying, patient may add a non-comedogenic moisturizer. The patient verbalized understanding of the proper use and possible adverse effects of retinoids.  All of the patient's questions and concerns were addressed.
Sotyktu Pregnancy And Lactation Text: There is insufficient data to evaluate whether or not Sotyktu is safe to use during pregnancy.? ?It is not known if Sotyktu passes into breast milk and whether or not it is safe to use when breastfeeding.??
Benzoyl Peroxide Pregnancy And Lactation Text: This medication is Pregnancy Category C. It is unknown if benzoyl peroxide is excreted in breast milk.
Minoxidil Counseling: Minoxidil is a topical medication which can increase blood flow where it is applied. It is uncertain how this medication increases hair growth. Side effects are uncommon and include stinging and allergic reactions.
Cyclophosphamide Counseling:  I discussed with the patient the risks of cyclophosphamide including but not limited to hair loss, hormonal abnormalities, decreased fertility, abdominal pain, diarrhea, nausea and vomiting, bone marrow suppression and infection. The patient understands that monitoring is required while taking this medication.
Topical Metronidazole Pregnancy And Lactation Text: This medication is Pregnancy Category B and considered safe during pregnancy.  It is also considered safe to use while breastfeeding.
Dupixent Counseling: I discussed with the patient the risks of dupilumab including but not limited to eye infection and irritation, cold sores, injection site reactions, worsening of asthma, allergic reactions and increased risk of parasitic infection.  Live vaccines should be avoided while taking dupilumab. Dupilumab will also interact with certain medications such as warfarin and cyclosporine. The patient understands that monitoring is required and they must alert us or the primary physician if symptoms of infection or other concerning signs are noted.
Quinolones Counseling:  I discussed with the patient the risks of fluoroquinolones including but not limited to GI upset, allergic reaction, drug rash, diarrhea, dizziness, photosensitivity, yeast infections, liver function test abnormalities, tendonitis/tendon rupture.
Topical Steroids Counseling: I discussed with the patient that prolonged use of topical steroids can result in the increased appearance of superficial blood vessels (telangiectasias), lightening (hypopigmentation) and thinning of the skin (atrophy).  Patient understands to avoid using high potency steroids in skin folds, the groin or the face.  The patient verbalized understanding of the proper use and possible adverse effects of topical steroids.  All of the patient's questions and concerns were addressed.
Glycopyrrolate Counseling:  I discussed with the patient the risks of glycopyrrolate including but not limited to skin rash, drowsiness, dry mouth, difficulty urinating, and blurred vision.
Adbry Counseling: I discussed with the patient the risks of tralokinumab including but not limited to eye infection and irritation, cold sores, injection site reactions, worsening of asthma, allergic reactions and increased risk of parasitic infection.  Live vaccines should be avoided while taking tralokinumab. The patient understands that monitoring is required and they must alert us or the primary physician if symptoms of infection or other concerning signs are noted.
Dupixent Pregnancy And Lactation Text: This medication likely crosses the placenta but the risk for the fetus is uncertain. This medication is excreted in breast milk.
Vtama Pregnancy And Lactation Text: It is unknown if this medication can cause problems during pregnancy and breastfeeding.
Doxepin Counseling:  Patient advised that the medication is sedating and not to drive a car after taking this medication. Patient informed of potential adverse effects including but not limited to dry mouth, urinary retention, and blurry vision.  The patient verbalized understanding of the proper use and possible adverse effects of doxepin.  All of the patient's questions and concerns were addressed.
Detail Level: Zone
Birth Control Pills Counseling: Birth Control Pill Counseling: I discussed with the patient the potential side effects of OCPs including but not limited to increased risk of stroke, heart attack, thrombophlebitis, deep venous thrombosis, hepatic adenomas, breast changes, GI upset, headaches, and depression.  The patient verbalized understanding of the proper use and possible adverse effects of OCPs. All of the patient's questions and concerns were addressed.
Azithromycin Pregnancy And Lactation Text: This medication is considered safe during pregnancy and is also secreted in breast milk.
Doxycycline Counseling:  Patient counseled regarding possible photosensitivity and increased risk for sunburn.  Patient instructed to avoid sunlight, if possible.  When exposed to sunlight, patients should wear protective clothing, sunglasses, and sunscreen.  The patient was instructed to call the office immediately if the following severe adverse effects occur:  hearing changes, easy bruising/bleeding, severe headache, or vision changes.  The patient verbalized understanding of the proper use and possible adverse effects of doxycycline.  All of the patient's questions and concerns were addressed.
Birth Control Pills Pregnancy And Lactation Text: This medication should be avoided if pregnant and for the first 30 days post-partum.
Elidel Counseling: Patient may experience a mild burning sensation during topical application. Elidel is not approved in children less than 2 years of age. There have been case reports of hematologic and skin malignancies in patients using topical calcineurin inhibitors although causality is questionable.
Xeljanz Counseling: I discussed with the patient the risks of Xeljanz therapy including increased risk of infection, liver issues, headache, diarrhea, or cold symptoms. Live vaccines should be avoided. They were instructed to call if they have any problems.
Clofazimine Counseling:  I discussed with the patient the risks of clofazimine including but not limited to skin and eye pigmentation, liver damage, nausea/vomiting, gastrointestinal bleeding and allergy.
Otezla Counseling: The side effects of Otezla were discussed with the patient, including but not limited to worsening or new depression, weight loss, diarrhea, nausea, upper respiratory tract infection, and headache. Patient instructed to call the office should any adverse effect occur.  The patient verbalized understanding of the proper use and possible adverse effects of Otezla.  All the patient's questions and concerns were addressed.
Doxycycline Pregnancy And Lactation Text: This medication is Pregnancy Category D and not consider safe during pregnancy. It is also excreted in breast milk but is considered safe for shorter treatment courses.
Carac Counseling:  I discussed with the patient the risks of Carac including but not limited to erythema, scaling, itching, weeping, crusting, and pain.
Qbrexza Counseling:  I discussed with the patient the risks of Qbrexza including but not limited to headache, mydriasis, blurred vision, dry eyes, nasal dryness, dry mouth, dry throat, dry skin, urinary hesitation, and constipation.  Local skin reactions including erythema, burning, stinging, and itching can also occur.
Infliximab Counseling:  I discussed with the patient the risks of infliximab including but not limited to myelosuppression, immunosuppression, autoimmune hepatitis, demyelinating diseases, lymphoma, and serious infections.  The patient understands that monitoring is required including a PPD at baseline and must alert us or the primary physician if symptoms of infection or other concerning signs are noted.
Dutasteride Male Counseling: Dustasteride Counseling:  I discussed with the patient the risks of use of dutasteride including but not limited to decreased libido, decreased ejaculate volume, and gynecomastia. Women who can become pregnant should not handle medication.  All of the patient's questions and concerns were addressed.
Xolair Counseling:  Patient informed of potential adverse effects including but not limited to fever, muscle aches, rash and allergic reactions.  The patient verbalized understanding of the proper use and possible adverse effects of Xolair.  All of the patient's questions and concerns were addressed.
Rifampin Counseling: I discussed with the patient the risks of rifampin including but not limited to liver damage, kidney damage, red-orange body fluids, nausea/vomiting and severe allergy.
Niacinamide Pregnancy And Lactation Text: These medications are considered safe during pregnancy.
Cyclophosphamide Pregnancy And Lactation Text: This medication is Pregnancy Category D and it isn't considered safe during pregnancy. This medication is excreted in breast milk.
Spevigo Counseling: I discussed with the patient the risks of Spevigo including but not limited to fatigue, nasuea, vomiting, headache, pruritus, urinary tract infection, an infusion related reactions.  The patient understands that monitoring is required including screening for tuberculosis at baseline and yearly screening thereafter while continuing Spevigo therapy. They should contact us if symptoms of infection or other concerning signs are noted.
Nsaids Counseling: NSAID Counseling: I discussed with the patient that NSAIDs should be taken with food. Prolonged use of NSAIDs can result in the development of stomach ulcers.  Patient advised to stop taking NSAIDs if abdominal pain occurs.  The patient verbalized understanding of the proper use and possible adverse effects of NSAIDs.  All of the patient's questions and concerns were addressed.
Cyclosporine Counseling:  I discussed with the patient the risks of cyclosporine including but not limited to hypertension, gingival hyperplasia,myelosuppression, immunosuppression, liver damage, kidney damage, neurotoxicity, lymphoma, and serious infections. The patient understands that monitoring is required including baseline blood pressure, CBC, CMP, lipid panel and uric acid, and then 1-2 times monthly CMP and blood pressure.
Tazorac Counseling:  Patient advised that medication is irritating and drying.  Patient may need to apply sparingly and wash off after an hour before eventually leaving it on overnight.  The patient verbalized understanding of the proper use and possible adverse effects of tazorac.  All of the patient's questions and concerns were addressed.
Adbry Pregnancy And Lactation Text: It is unknown if this medication will adversely affect pregnancy or breast feeding.
Mirvaso Counseling: Mirvaso is a topical medication which can decrease superficial blood flow where applied. Side effects are uncommon and include stinging, redness and allergic reactions.
Topical Steroids Applications Pregnancy And Lactation Text: Most topical steroids are considered safe to use during pregnancy and lactation.  Any topical steroid applied to the breast or nipple should be washed off before breastfeeding.
Glycopyrrolate Pregnancy And Lactation Text: This medication is Pregnancy Category B and is considered safe during pregnancy. It is unknown if it is excreted breast milk.
Enbrel Counseling:  I discussed with the patient the risks of etanercept including but not limited to myelosuppression, immunosuppression, autoimmune hepatitis, demyelinating diseases, lymphoma, and infections.  The patient understands that monitoring is required including a PPD at baseline and must alert us or the primary physician if symptoms of infection or other concerning signs are noted.
Litfulo Counseling: I discussed with the patient the risks of Litfulo therapy including but not limited to upper respiratory tract infections, shingles, cold sores, and nausea. Live vaccines should be avoided.  This medication has been linked to serious infections; higher rate of mortality; malignancy and lymphoproliferative disorders; major adverse cardiovascular events; thrombosis; gastrointestinal perforations; neutropenia; lymphopenia; anemia; liver enzyme elevations; and lipid elevations.
High Dose Vitamin A Counseling: Side effects reviewed, pt to contact office should one occur.
Zoryve Counseling:  I discussed with the patient that Zoryve is not for use in the eyes, mouth or vagina. The most commonly reported side effects include diarrhea, headache, insomnia, application site pain, upper respiratory tract infections, and urinary tract infections.  All of the patient's questions and concerns were addressed.
Doxepin Pregnancy And Lactation Text: This medication is Pregnancy Category C and it isn't known if it is safe during pregnancy. It is also excreted in breast milk and breast feeding isn't recommended.
Griseofulvin Counseling:  I discussed with the patient the risks of griseofulvin including but not limited to photosensitivity, cytopenia, liver damage, nausea/vomiting and severe allergy.  The patient understands that this medication is best absorbed when taken with a fatty meal (e.g., ice cream or french fries).
Griseofulvin Pregnancy And Lactation Text: This medication is Pregnancy Category X and is known to cause serious birth defects. It is unknown if this medication is excreted in breast milk but breast feeding should be avoided.
Spironolactone Counseling: Patient advised regarding risks of diarrhea, abdominal pain, hyperkalemia, birth defects (for female patients), liver toxicity and renal toxicity. The patient may need blood work to monitor liver and kidney function and potassium levels while on therapy. The patient verbalized understanding of the proper use and possible adverse effects of spironolactone.  All of the patient's questions and concerns were addressed.
Rifampin Pregnancy And Lactation Text: This medication is Pregnancy Category C and it isn't know if it is safe during pregnancy. It is also excreted in breast milk and should not be used if you are breast feeding.
Qbrexza Pregnancy And Lactation Text: There is no available data on Qbrexza use in pregnant women.  There is no available data on Qbrexza use in lactation.
Dutasteride Female Counseling: Dutasteride Counseling:  I discussed with the patient the risks of use of dutasteride including but not limited to decreased libido and sexual dysfunction. Explained the teratogenic nature of the medication and stressed the importance of not getting pregnant during treatment. All of the patient's questions and concerns were addressed.
Xolair Pregnancy And Lactation Text: This medication is Pregnancy Category B and is considered safe during pregnancy. This medication is excreted in breast milk.
Bactrim Counseling:  I discussed with the patient the risks of sulfa antibiotics including but not limited to GI upset, allergic reaction, drug rash, diarrhea, dizziness, photosensitivity, and yeast infections.  Rarely, more serious reactions can occur including but not limited to aplastic anemia, agranulocytosis, methemoglobinemia, blood dyscrasias, liver or kidney failure, lung infiltrates or desquamative/blistering drug rashes.
Rituxan Counseling:  I discussed with the patient the risks of Rituxan infusions. Side effects can include infusion reactions, severe drug rashes including mucocutaneous reactions, reactivation of latent hepatitis and other infections and rarely progressive multifocal leukoencephalopathy.  All of the patient's questions and concerns were addressed.
Tazorac Pregnancy And Lactation Text: This medication is not safe during pregnancy. It is unknown if this medication is excreted in breast milk.
Dutasteride Pregnancy And Lactation Text: This medication is absolutely contraindicated in women, especially during pregnancy and breast feeding. Feminization of male fetuses is possible if taking while pregnant.
Nsaids Pregnancy And Lactation Text: These medications are considered safe up to 30 weeks gestation. It is excreted in breast milk.
Otezla Pregnancy And Lactation Text: This medication is Pregnancy Category C and it isn't known if it is safe during pregnancy. It is unknown if it is excreted in breast milk.
High Dose Vitamin A Pregnancy And Lactation Text: High dose vitamin A therapy is contraindicated during pregnancy and breast feeding.
Hydroxychloroquine Counseling:  I discussed with the patient that a baseline ophthalmologic exam is needed at the start of therapy and every year thereafter while on therapy. A CBC may also be warranted for monitoring.  The side effects of this medication were discussed with the patient, including but not limited to agranulocytosis, aplastic anemia, seizures, rashes, retinopathy, and liver toxicity. Patient instructed to call the office should any adverse effect occur.  The patient verbalized understanding of the proper use and possible adverse effects of Plaquenil.  All the patient's questions and concerns were addressed.
Hydroxyzine Counseling: Patient advised that the medication is sedating and not to drive a car after taking this medication.  Patient informed of potential adverse effects including but not limited to dry mouth, urinary retention, and blurry vision.  The patient verbalized understanding of the proper use and possible adverse effects of hydroxyzine.  All of the patient's questions and concerns were addressed.
Bimzelx Counseling:  I discussed with the patient the risks of Bimzelx including but not limited to depression, immunosuppression, allergic reactions and infections.  The patient understands that monitoring is required including a PPD at baseline and must alert us or the primary physician if symptoms of infection or other concerning signs are noted.
Xelmuraliz Pregnancy And Lactation Text: This medication is Pregnancy Category D and is not considered safe during pregnancy.  The risk during breast feeding is also uncertain.
Litfulo Pregnancy And Lactation Text: Based on animal studies, Lifulo may cause embryo-fetal harm when administered to pregnant women.  The medication should not be used in pregnancy.  Breastfeeding is not recommended during treatment.
Topical Sulfur Applications Counseling: Topical Sulfur Counseling: Patient counseled that this medication may cause skin irritation or allergic reactions.  In the event of skin irritation, the patient was advised to reduce the amount of the drug applied or use it less frequently.   The patient verbalized understanding of the proper use and possible adverse effects of topical sulfur application.  All of the patient's questions and concerns were addressed.
Erythromycin Counseling:  I discussed with the patient the risks of erythromycin including but not limited to GI upset, allergic reaction, drug rash, diarrhea, increase in liver enzymes, and yeast infections.
Tranexamic Acid Counseling:  Patient advised of the small risk of bleeding problems with tranexamic acid. They were also instructed to call if they developed any nausea, vomiting or diarrhea. All of the patient's questions and concerns were addressed.

## 2024-09-24 NOTE — PROCEDURE: LIQUID NITROGEN
Render Note In Bullet Format When Appropriate: No
Medical Necessity Clause: This procedure was medically necessary because the lesions that were treated were:
Number Of Freeze-Thaw Cycles: 2 freeze-thaw cycles
no abdominal pain, no bloating, no constipation, no diarrhea, no nausea and no vomiting.
Show Aperture Variable?: Yes
Detail Level: Detailed
Spray Paint Text: The liquid nitrogen was applied to the skin utilizing a spray paint frosting technique.
Post-Care Instructions: I reviewed with the patient in detail post-care instructions. Patient is to wear sunprotection, and avoid picking at any of the treated lesions. Pt may apply Vaseline to crusted or scabbing areas.
Duration Of Freeze Thaw-Cycle (Seconds): 5-10
Consent: The patient's consent was obtained including but not limited to risks of crusting, scabbing, blistering, scarring, darker or lighter pigmentary change, recurrence, incomplete removal and infection.
Medical Necessity Information: It is in your best interest to select a reason for this procedure from the list below. All of these items fulfill various CMS LCD requirements except the new and changing color options.
Duration Of Freeze Thaw-Cycle (Seconds): 10
Consent: The patient's consent was obtained including but not limited to risks of crusting, scabbing, blistering, scarring, darker or lighter pigmentary change, recurrence, incomplete removal and infection. RTC in 2 months if lesion(s) persistent.

## 2024-09-24 NOTE — PROCEDURE: ADDITIONAL NOTES
Additional Notes: Gave direct line to call in 1 month if lesion persists
Detail Level: Detailed
Render Risk Assessment In Note?: no

## 2024-09-28 NOTE — TELEPHONE ENCOUNTER
09/27/24 2330   Vital Signs   Temp 97.7 °F (36.5 °C)   Temp Source Oral   Pulse 83   Heart Rate Source Monitor   Resp 16   Device (Oxygen Therapy) room air   BP (!) 144/69   BP Location Left arm   BP Method Automatic   Patient Position Lying     HD tx ended 15min towards the end of the tx due to clotting.  Aspirated and flushed with NS.  Both lines locked with heparin.  Total UF : 1820ml.  Pt is alert and stable. VSS.   Agreed, hold norvasc unless SBP rises to be consistently >130. Thanks!

## 2024-10-11 ENCOUNTER — APPOINTMENT (OUTPATIENT)
Dept: CARDIOLOGY | Facility: MEDICAL CENTER | Age: 69
End: 2024-10-11
Attending: INTERNAL MEDICINE
Payer: MEDICARE

## 2024-10-11 ENCOUNTER — OFFICE VISIT (OUTPATIENT)
Dept: MEDICAL GROUP | Facility: LAB | Age: 69
End: 2024-10-11
Payer: MEDICARE

## 2024-10-11 VITALS
HEART RATE: 98 BPM | DIASTOLIC BLOOD PRESSURE: 70 MMHG | HEIGHT: 72 IN | SYSTOLIC BLOOD PRESSURE: 122 MMHG | TEMPERATURE: 97.1 F | BODY MASS INDEX: 17.26 KG/M2 | OXYGEN SATURATION: 97 % | WEIGHT: 127.4 LBS

## 2024-10-11 DIAGNOSIS — Z23 NEED FOR VACCINATION: ICD-10-CM

## 2024-10-11 DIAGNOSIS — Z63.0 MARITAL CONFLICT: ICD-10-CM

## 2024-10-11 DIAGNOSIS — K63.8219 SMALL INTESTINAL BACTERIAL OVERGROWTH (SIBO): ICD-10-CM

## 2024-10-11 PROCEDURE — G0008 ADMIN INFLUENZA VIRUS VAC: HCPCS | Performed by: INTERNAL MEDICINE

## 2024-10-11 PROCEDURE — 3074F SYST BP LT 130 MM HG: CPT | Performed by: INTERNAL MEDICINE

## 2024-10-11 PROCEDURE — 90662 IIV NO PRSV INCREASED AG IM: CPT | Performed by: INTERNAL MEDICINE

## 2024-10-11 PROCEDURE — 3078F DIAST BP <80 MM HG: CPT | Performed by: INTERNAL MEDICINE

## 2024-10-11 PROCEDURE — 99214 OFFICE O/P EST MOD 30 MIN: CPT | Mod: 25 | Performed by: INTERNAL MEDICINE

## 2024-10-11 SDOH — SOCIAL STABILITY - SOCIAL INSECURITY: PROBLEMS IN RELATIONSHIP WITH SPOUSE OR PARTNER: Z63.0

## 2024-10-11 ASSESSMENT — FIBROSIS 4 INDEX: FIB4 SCORE: 2.76

## 2024-10-14 ENCOUNTER — APPOINTMENT (RX ONLY)
Dept: URBAN - METROPOLITAN AREA CLINIC 36 | Facility: CLINIC | Age: 69
Setting detail: DERMATOLOGY
End: 2024-10-14

## 2024-10-14 DIAGNOSIS — Z41.9 ENCOUNTER FOR PROCEDURE FOR PURPOSES OTHER THAN REMEDYING HEALTH STATE, UNSPECIFIED: ICD-10-CM

## 2024-10-14 DIAGNOSIS — Z48.817 ENCOUNTER FOR SURGICAL AFTERCARE FOLLOWING SURGERY ON THE SKIN AND SUBCUTANEOUS TISSUE: ICD-10-CM

## 2024-10-14 PROCEDURE — ? POST-OP WOUND CHECK

## 2024-10-14 PROCEDURE — ? FRAXEL

## 2024-10-14 ASSESSMENT — LOCATION SIMPLE DESCRIPTION DERM: LOCATION SIMPLE: LEFT NOSE

## 2024-10-14 ASSESSMENT — LOCATION DETAILED DESCRIPTION DERM: LOCATION DETAILED: LEFT NASAL ALA

## 2024-10-14 ASSESSMENT — LOCATION ZONE DERM: LOCATION ZONE: NOSE

## 2024-10-14 NOTE — PROCEDURE: FRAXEL
Number Of Passes: 1
Number Of Passes: 4
Energy(Mj/Cm2): 70
Was An Eye Shield Used?: No
Small Plastic Eye Shield Text: The ocular mucosa was anesthetized with tetracaine. Once adequate anesthesia was optained, small plastic eye shields were inserted and remained in place until the procedure was completed.
Location: nose
Price (Use Numbers Only, No Special Characters Or $): 0
Treatment Level: 5
Medium Plastic Eye Shield Text: The ocular mucosa was anesthetized with tetracaine. Once adequate anesthesia was optained, medium plastic eye shields were inserted and remained in place until the procedure was completed.
Small Metal Eye Shield Text: The ocular mucosa was anesthetized with tetracaine. Once adequate anesthesia was optained, small metal eye shields were inserted and remained in place until the procedure was completed.
Large Metal Eye Shield Text: The ocular mucosa was anesthetized with tetracaine. Once adequate anesthesia was optained, large metal eye shields were inserted and remained in place until the procedure was completed.
Medium Metal Eye Shield Text: The ocular mucosa was anesthetized with tetracaine. Once adequate anesthesia was optained, medium metal eye shields were inserted and remained in place until the procedure was completed.
Indication: surgical scars
Wavelength: 1550nm
Detail Level: Zone
Large Plastic Eye Shield Text: The ocular mucosa was anesthetized with tetracaine. Once adequate anesthesia was optained, large plastic eye shields were inserted and remained in place until the procedure was completed.
Consent: Written consent obtained, risks reviewed including but not limited to pain and incomplete improvement.
Post-Care Instructions: I reviewed with the patient in detail post-care instructions. Patient should avoid sun until area fully healed.

## 2024-10-14 NOTE — PROCEDURE: POST-OP WOUND CHECK
Detail Level: Detailed
Add 40896 Cpt? (Important Note: In 2017 The Use Of 44644 Is Being Tracked By Cms To Determine Future Global Period Reimbursement For Global Periods): no

## 2024-10-24 DIAGNOSIS — I10 ESSENTIAL HYPERTENSION: ICD-10-CM

## 2024-10-24 RX ORDER — SPIRONOLACTONE 25 MG/1
25 TABLET ORAL DAILY
Qty: 90 TABLET | Refills: 0 | Status: SHIPPED | OUTPATIENT
Start: 2024-10-24

## 2024-11-12 ENCOUNTER — APPOINTMENT (RX ONLY)
Dept: URBAN - METROPOLITAN AREA CLINIC 36 | Facility: CLINIC | Age: 69
Setting detail: DERMATOLOGY
End: 2024-11-12

## 2024-11-12 DIAGNOSIS — Z41.9 ENCOUNTER FOR PROCEDURE FOR PURPOSES OTHER THAN REMEDYING HEALTH STATE, UNSPECIFIED: ICD-10-CM

## 2024-11-12 DIAGNOSIS — Z48.817 ENCOUNTER FOR SURGICAL AFTERCARE FOLLOWING SURGERY ON THE SKIN AND SUBCUTANEOUS TISSUE: ICD-10-CM

## 2024-11-12 PROCEDURE — ? POST-OP WOUND CHECK

## 2024-11-12 PROCEDURE — ? FRAXEL

## 2024-11-12 ASSESSMENT — LOCATION ZONE DERM: LOCATION ZONE: NOSE

## 2024-11-12 ASSESSMENT — LOCATION DETAILED DESCRIPTION DERM: LOCATION DETAILED: LEFT NASAL ALA

## 2024-11-12 ASSESSMENT — LOCATION SIMPLE DESCRIPTION DERM: LOCATION SIMPLE: LEFT NOSE

## 2024-11-12 NOTE — PROCEDURE: POST-OP WOUND CHECK
Detail Level: Detailed
Add 34877 Cpt? (Important Note: In 2017 The Use Of 53946 Is Being Tracked By Cms To Determine Future Global Period Reimbursement For Global Periods): no

## 2024-11-12 NOTE — PROCEDURE: FRAXEL
Number Of Passes: 1
Number Of Passes: 4
Energy(Mj/Cm2): 70
Was An Eye Shield Used?: No
Small Plastic Eye Shield Text: The ocular mucosa was anesthetized with tetracaine. Once adequate anesthesia was optained, small plastic eye shields were inserted and remained in place until the procedure was completed.
Location: nose
Price (Use Numbers Only, No Special Characters Or $): 0
Treatment Level: 5
Medium Plastic Eye Shield Text: The ocular mucosa was anesthetized with tetracaine. Once adequate anesthesia was optained, medium plastic eye shields were inserted and remained in place until the procedure was completed.
Small Metal Eye Shield Text: The ocular mucosa was anesthetized with tetracaine. Once adequate anesthesia was optained, small metal eye shields were inserted and remained in place until the procedure was completed.
Large Metal Eye Shield Text: The ocular mucosa was anesthetized with tetracaine. Once adequate anesthesia was optained, large metal eye shields were inserted and remained in place until the procedure was completed.
Medium Metal Eye Shield Text: The ocular mucosa was anesthetized with tetracaine. Once adequate anesthesia was optained, medium metal eye shields were inserted and remained in place until the procedure was completed.
Indication: surgical scars
Treatment Number: 2
Wavelength: 1550nm
Detail Level: Zone
Large Plastic Eye Shield Text: The ocular mucosa was anesthetized with tetracaine. Once adequate anesthesia was optained, large plastic eye shields were inserted and remained in place until the procedure was completed.
Consent: Written consent obtained, risks reviewed including but not limited to pain and incomplete improvement.
Post-Care Instructions: I reviewed with the patient in detail post-care instructions. Patient should avoid sun until area fully healed.

## 2024-11-22 ENCOUNTER — TELEPHONE (OUTPATIENT)
Dept: MEDICAL GROUP | Facility: LAB | Age: 69
End: 2024-11-22
Payer: MEDICARE

## 2024-11-25 ENCOUNTER — APPOINTMENT (OUTPATIENT)
Dept: MEDICAL GROUP | Facility: LAB | Age: 69
End: 2024-11-25
Payer: MEDICARE

## 2024-11-25 VITALS
BODY MASS INDEX: 17.9 KG/M2 | OXYGEN SATURATION: 95 % | HEIGHT: 72 IN | HEART RATE: 92 BPM | TEMPERATURE: 97.8 F | SYSTOLIC BLOOD PRESSURE: 122 MMHG | WEIGHT: 132.2 LBS | DIASTOLIC BLOOD PRESSURE: 72 MMHG

## 2024-11-25 DIAGNOSIS — R19.7 DIARRHEA, UNSPECIFIED TYPE: ICD-10-CM

## 2024-11-25 DIAGNOSIS — K63.8219 SMALL INTESTINAL BACTERIAL OVERGROWTH (SIBO): ICD-10-CM

## 2024-11-25 DIAGNOSIS — K50.019 CROHN'S DISEASE OF SMALL INTESTINE WITH COMPLICATION (HCC): ICD-10-CM

## 2024-11-25 DIAGNOSIS — Z63.0 MARITAL DYSFUNCTION: ICD-10-CM

## 2024-11-25 DIAGNOSIS — I73.00 RAYNAUD'S DISEASE WITHOUT GANGRENE: ICD-10-CM

## 2024-11-25 DIAGNOSIS — K21.9 GASTROESOPHAGEAL REFLUX DISEASE WITHOUT ESOPHAGITIS: ICD-10-CM

## 2024-11-25 PROCEDURE — 3078F DIAST BP <80 MM HG: CPT | Performed by: INTERNAL MEDICINE

## 2024-11-25 PROCEDURE — 3074F SYST BP LT 130 MM HG: CPT | Performed by: INTERNAL MEDICINE

## 2024-11-25 PROCEDURE — 99214 OFFICE O/P EST MOD 30 MIN: CPT | Performed by: INTERNAL MEDICINE

## 2024-11-25 RX ORDER — AZELAIC ACID 0.15 G/G
GEL TOPICAL
COMMUNITY
Start: 2024-10-01

## 2024-11-25 RX ORDER — OXYCODONE HYDROCHLORIDE 10 MG/1
10 TABLET ORAL EVERY 6 HOURS PRN
Qty: 90 TABLET | Refills: 0 | Status: SHIPPED | OUTPATIENT
Start: 2025-02-17 | End: 2025-03-19

## 2024-11-25 RX ORDER — AMLODIPINE BESYLATE 2.5 MG/1
2.5 TABLET ORAL DAILY
Qty: 90 TABLET | Refills: 3 | Status: SHIPPED | OUTPATIENT
Start: 2024-11-25

## 2024-11-25 RX ORDER — METOLAZONE 2.5 MG/1
1 TABLET ORAL
COMMUNITY
End: 2024-11-27

## 2024-11-25 RX ORDER — OXYCODONE HYDROCHLORIDE 10 MG/1
10 TABLET ORAL EVERY 6 HOURS PRN
Qty: 90 TABLET | Refills: 0 | Status: SHIPPED | OUTPATIENT
Start: 2024-12-19 | End: 2025-01-18

## 2024-11-25 RX ORDER — ZINC PICOLINATE
1 POWDER (GRAM) MISCELLANEOUS 2 TIMES DAILY
COMMUNITY

## 2024-11-25 RX ORDER — OXYCODONE HYDROCHLORIDE 10 MG/1
10 TABLET ORAL EVERY 4 HOURS PRN
Qty: 90 TABLET | Refills: 0 | Status: SHIPPED | OUTPATIENT
Start: 2025-01-18 | End: 2025-02-17

## 2024-11-25 RX ORDER — DIAZEPAM 5 MG/1
1 TABLET ORAL EVERY 6 HOURS PRN
COMMUNITY

## 2024-11-25 RX ORDER — IPRATROPIUM BROMIDE 42 UG/1
SPRAY, METERED NASAL
COMMUNITY

## 2024-11-25 SDOH — SOCIAL STABILITY - SOCIAL INSECURITY: PROBLEMS IN RELATIONSHIP WITH SPOUSE OR PARTNER: Z63.0

## 2024-11-25 ASSESSMENT — FIBROSIS 4 INDEX: FIB4 SCORE: 2.76

## 2024-11-25 NOTE — PROGRESS NOTES
CC: Jana Overton is a 69 y.o. female is suffering from   Chief Complaint   Patient presents with    Follow-Up    Dizziness    Numbness     In feet          SUBJECTIVE:  1. Crohn's disease of small intestine with complication (HCC)  Jana is here for follow-up suffers from Crohn's disease of the small intestine is having problems with what she think may be repeat issues with a small intestinal bacterial overgrowth    2. Gastroesophageal reflux disease without esophagitis  History of gastroesophageal reflux disease clinically stable    3. Diarrhea, unspecified type  Diarrhea etiology uncertain    4. Raynaud's disease without gangrene  Discussed the possibility she may be suffering from a nods disease patient states that she has had problems with her fingers turning blue photograph was taken    5. Small intestinal bacterial overgrowth (SIBO)  Probable small intestinal bacterial overgrowth    6. Marital dysfunction  Patient and I have discussed that her  has been gone for the last 150 days, looks like he will be heading home to [place].  Wife is asked for an apology for his absence which apparently has not happened.      Past social, family, history: , significant marital problems  Social History     Tobacco Use    Smoking status: Never    Smokeless tobacco: Never    Tobacco comments:     second hand smoke parents - smoked for only 1 year many years ago   Vaping Use    Vaping status: Every Day    Substances: THC   Substance Use Topics    Alcohol use: Not Currently     Alcohol/week: 0.6 oz     Types: 1 Shots of liquor per week     Comment: gin and half a lime; tonic water    Drug use: Not Currently     Types: Marijuana, Inhaled     Comment: medical marijuana through bong/vape         MEDICATIONS:    Current Outpatient Medications:     Azelaic Acid 15 % Gel, APPLY TOPICALLY TO FACE TWICE DAILY, Disp: , Rfl:     diazePAM (VALIUM) 5 MG Tab, Take 1 Tablet by mouth every 6 hours as needed., Disp: , Rfl:      ipratropium (ATROVENT) 0.06 % Solution, , Disp: , Rfl:     metOLazone (ZAROXOLYN) 2.5 MG Tab, Take 1 Tablet by mouth every day., Disp: , Rfl:     Zinc Picolinate Powder, Take 1 Tablet by mouth 2 times a day., Disp: , Rfl:     [START ON 2/17/2025] oxyCODONE immediate release (ROXICODONE) 10 MG immediate release tablet, Take 1 Tablet by mouth every 6 hours as needed for Moderate Pain (Refill #3 of #3) for up to 30 days., Disp: 90 Tablet, Rfl: 0    [START ON 1/18/2025] oxyCODONE immediate release (ROXICODONE) 10 MG immediate release tablet, Take 1 Tablet by mouth every four hours as needed for Moderate Pain (Refill #2 of #3) for up to 30 days., Disp: 90 Tablet, Rfl: 0    [START ON 12/19/2024] oxyCODONE immediate release (ROXICODONE) 10 MG immediate release tablet, Take 1 Tablet by mouth every 6 hours as needed for Moderate Pain (refill #1 of #3) for up to 30 days., Disp: 90 Tablet, Rfl: 0    amLODIPine (NORVASC) 2.5 MG Tab, Take 1 Tablet by mouth every day., Disp: 90 Tablet, Rfl: 3    amoxicillin-clavulanate (AUGMENTIN) 875-125 MG Tab, Take 1 Tablet by mouth 2 times a day., Disp: 14 Tablet, Rfl: 0    spironolactone (ALDACTONE) 25 MG Tab, Take 1 Tablet by mouth every day. **LAST SEEN 12/2023 .  TO ENSURE FURTHER REFILLS, KEEP SCHEDULED FOLLOW UP VISIT 11/27/2024.**, Disp: 90 Tablet, Rfl: 0    traZODone (DESYREL) 50 MG Tab, TAKE 1 TABLET BY MOUTH EVERY EVENING, Disp: 90 Tablet, Rfl: 3    cetirizine (ZYRTEC) 1 MG/ML Solution oral solution, Take 10 mL by mouth every day., Disp: 150 mL, Rfl: 3    potassium chloride SA (KDUR) 20 MEQ Tab CR, Take 1 Tablet by mouth 2 times a day., Disp: 180 Tablet, Rfl: 1    hydroxychloroquine (PLAQUENIL) 200 MG Tab, Take 1 Tablet by mouth every day., Disp: 90 Tablet, Rfl: 3    furosemide (LASIX) 20 MG Tab, Take 1 Tablet by mouth every day., Disp: 100 Tablet, Rfl: 0    ondansetron (ZOFRAN ODT) 4 MG TABLET DISPERSIBLE, Take 1 Tablet by mouth every 6 hours as needed for Nausea/Vomiting., Disp:  20 Tablet, Rfl: 5    metoprolol tartrate (LOPRESSOR) 50 MG Tab, Take 1 Tablet by mouth 2 times a day., Disp: 180 Tablet, Rfl: 3    lidocaine (ASPERFLEX) 4 % Patch, Place 1 Patch on the skin every 24 hours., Disp: 30 Patch, Rfl: 2    Azelastine (ASTELIN) 137 MCG/SPRAY Solution, Administer 1 Spray into each nostril at bedtime., Disp: , Rfl:     fluticasone (FLONASE) 50 MCG/ACT nasal spray, Administer 1 Spray into each nostril at bedtime., Disp: , Rfl:     nystatin (MYCOSTATIN) 305459 UNIT/ML Suspension, Take 5 mL by mouth 4 times a day., Disp: 60 mL, Rfl: 0    prochlorperazine (COMPAZINE) 10 MG Tab, Take 1 Tablet by mouth every 6 hours as needed for Nausea/Vomiting., Disp: 30 Tablet, Rfl: 3    granisetron (KYTRIL) 1 MG Tab, Take 1 Tablet by mouth every 12 hours as needed for Nausea/Vomiting., Disp: 10 Tablet, Rfl: 0    levalbuterol (XOPENEX HFA) 45 MCG/ACT inhaler, Inhale 2 Puffs every four hours as needed for Shortness of Breath (As needed for shortness of breath, cough, wheezing.)., Disp: 1 Each, Rfl: 11    Ascorbic Acid (VITAMIN C PO), Take 1 Tablet by mouth 2 times a day., Disp: , Rfl:     Biotin w/ Vitamins C & E (HAIR SKIN & NAILS GUMMIES PO), Take 1 Tablet by mouth 2 times a day., Disp: , Rfl:     naratriptan (AMERGE) 2.5 MG tablet, Take 1 Tablet by mouth as needed for Migraine., Disp: 5 Tablet, Rfl: 3    cetirizine (ZYRTEC) 1 MG/ML Solution oral solution, Take 10 mL by mouth every day., Disp: 473 mL, Rfl: 0    NON SPECIFIED, [facility] Sacroiliac belt., Disp: 1 Each, Rfl: 1    PROBLEMS:  Patient Active Problem List    Diagnosis Date Noted    Thrombocytopenia (HCC) 12/11/2023    Chronic diastolic congestive heart failure (HCC) 12/11/2023    Chronic kidney disease, stage 3b 11/22/2023    Transient alteration of awareness 10/26/2023    Achalasia 10/18/2023    Esophageal candidiasis (HCC) 02/08/2023    Sacroiliac joint pain 11/10/2022    Esophageal stricture 10/22/2022    Dysphagia 10/21/2022     Drug-induced lupus erythematosus 09/27/2022    Long-term use of hydroxychloroquine 09/27/2022    Fibromyalgia syndrome 09/27/2022    Primary osteoarthritis of both hands 07/28/2022    Positive cardiolipin antibodies (IgA and IgG anti-CL) 07/27/2022    Positive VAHID (1:640 homogenous pattern with negative reflex) 07/27/2022    Inflammatory arthritis 07/27/2022    Mild tetrahydrocannabinol (THC) abuse 06/02/2022    Oropharyngeal dysphagia 06/02/2022    Acute pancreatitis 05/23/2022    Migraine 06/04/2019    Essential hypertension 05/20/2019    SIRS (systemic inflammatory response syndrome) (Formerly [facility]) 05/19/2019    History of MRSA infection 12/29/2018    History of infection with vancomycin resistant Enterococcus (VRE) 12/29/2018    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Former[facility]) 12/28/2018    Anemia 12/15/2018    Hyponatremia 12/12/2018    Leukocytosis 12/12/2018    Anxiety disorder due to multiple medical problems 12/01/2017    DDD (degenerative disc disease), lumbar 04/12/2017    History of DVT (deep vein thrombosis) 11/23/2016    Atrial fibrillation (Former[facility]) 03/26/2015    Sleep apnea 10/26/2014    Postherpetic neuralgia 06/24/2014    Multiple falls 06/24/2014    COPD (chronic obstructive pulmonary disease) (Formerly [facility]) 06/24/2014    Rosacea 06/24/2014    Ileostomy in place (Formerly [facility]) 06/26/2013    GERD (gastroesophageal reflux disease) 12/05/2012    Status post lumbar surgery 07/16/2012    Crohn's disease of small intestine with complication (Former[facility]) 09/23/2009    Central sensitization to pain 09/23/2009    Opioid type dependence, continuous (CMS-[facility]) 09/23/2009    Degenerative joint disease of cervical and lumbar spine 09/23/2009       REVIEW OF SYSTEMS:  Gen.:  No Nausea, Vomiting, fever, Chills.  Heart: No chest pain.  Lungs:  No shortness of Breath.  Psychological: Continued anxiety     PHYSICAL EXAM      Constitutional: Alert, cooperative, not in acute distress.  Cardiovascular:  Rate Rhythm is regular without murmurs rubs clicks.     Thorax & Lungs: Clear  to auscultation, no wheezing, rhonchi, or rales  HENT: Normocephalic, Atraumatic.  Eyes: PERRLA, EOMI, Conjunctiva normal.   Neck: Trachia is midline no swelling of the thyroid.   Lymphatic: No lymphadenopathy noted.   Neurologic: Alert & oriented x 3, cranial nerves II through XII are intact, Normal motor function, Normal sensory function, No focal deficits noted.   Psychiatric: Affect normal, Judgment normal, Mood normal.     VITAL SIGNS:/72   Pulse 92   Temp 36.6 °C (97.8 °F) (Temporal)   Ht 1.829 m (6')   Wt 60 kg (132 lb 3.2 oz)   SpO2 95%   BMI 17.93 kg/m²     Labs: Reviewed    Assessment:                                                     Plan:     1. Crohn's disease of small intestine with complication (HCC)  Continue Roxicodone [place] drug task force reviewed  - oxyCODONE immediate release (ROXICODONE) 10 MG immediate release tablet; Take 1 Tablet by mouth every 6 hours as needed for Moderate Pain (Refill #3 of #3) for up to 30 days.  Dispense: 90 Tablet; Refill: 0  - oxyCODONE immediate release (ROXICODONE) 10 MG immediate release tablet; Take 1 Tablet by mouth every four hours as needed for Moderate Pain (Refill #2 of #3) for up to 30 days.  Dispense: 90 Tablet; Refill: 0  - oxyCODONE immediate release (ROXICODONE) 10 MG immediate release tablet; Take 1 Tablet by mouth every 6 hours as needed for Moderate Pain (refill #1 of #3) for up to 30 days.  Dispense: 90 Tablet; Refill: 0    2. Gastroesophageal reflux disease without esophagitis  No change in medical therapy  - oxyCODONE immediate release (ROXICODONE) 10 MG immediate release tablet; Take 1 Tablet by mouth every 6 hours as needed for Moderate Pain (Refill #3 of #3) for up to 30 days.  Dispense: 90 Tablet; Refill: 0  - oxyCODONE immediate release (ROXICODONE) 10 MG immediate release tablet; Take 1 Tablet by mouth every four hours as needed for Moderate Pain (Refill #2 of #3) for up to 30 days.  Dispense: 90 Tablet; Refill: 0  - oxyCODONE  immediate release (ROXICODONE) 10 MG immediate release tablet; Take 1 Tablet by mouth every 6 hours as needed for Moderate Pain (refill #1 of #3) for up to 30 days.  Dispense: 90 Tablet; Refill: 0    3. Diarrhea, unspecified type  Patient with ostomy in place  - oxyCODONE immediate release (ROXICODONE) 10 MG immediate release tablet; Take 1 Tablet by mouth every 6 hours as needed for Moderate Pain (Refill #3 of #3) for up to 30 days.  Dispense: 90 Tablet; Refill: 0  - oxyCODONE immediate release (ROXICODONE) 10 MG immediate release tablet; Take 1 Tablet by mouth every four hours as needed for Moderate Pain (Refill #2 of #3) for up to 30 days.  Dispense: 90 Tablet; Refill: 0  - oxyCODONE immediate release (ROXICODONE) 10 MG immediate release tablet; Take 1 Tablet by mouth every 6 hours as needed for Moderate Pain (refill #1 of #3) for up to 30 days.  Dispense: 90 Tablet; Refill: 0    4. Raynaud's disease without gangrene  Start low-dose amlodipine  - amLODIPine (NORVASC) 2.5 MG Tab; Take 1 Tablet by mouth every day.  Dispense: 90 Tablet; Refill: 3    5. Small intestinal bacterial overgrowth (SIBO)  Possible small intestinal bacterial overgrowth patient is to have Augmentin available for treatment  - amoxicillin-clavulanate (AUGMENTIN) 875-125 MG Tab; Take 1 Tablet by mouth 2 times a day.  Dispense: 14 Tablet; Refill: 0

## 2024-11-26 ENCOUNTER — PATIENT MESSAGE (OUTPATIENT)
Dept: MEDICAL GROUP | Facility: LAB | Age: 69
End: 2024-11-26
Payer: MEDICARE

## 2024-11-27 ENCOUNTER — OFFICE VISIT (OUTPATIENT)
Dept: CARDIOLOGY | Facility: MEDICAL CENTER | Age: 69
End: 2024-11-27
Attending: INTERNAL MEDICINE
Payer: MEDICARE

## 2024-11-27 VITALS
HEIGHT: 72 IN | DIASTOLIC BLOOD PRESSURE: 78 MMHG | HEART RATE: 103 BPM | SYSTOLIC BLOOD PRESSURE: 122 MMHG | RESPIRATION RATE: 14 BRPM | BODY MASS INDEX: 17.66 KG/M2 | WEIGHT: 130.4 LBS | OXYGEN SATURATION: 94 %

## 2024-11-27 DIAGNOSIS — N18.32 CHRONIC KIDNEY DISEASE, STAGE 3B: ICD-10-CM

## 2024-11-27 DIAGNOSIS — D69.2 SENILE PURPURA (HCC): ICD-10-CM

## 2024-11-27 DIAGNOSIS — G89.4 CHRONIC PAIN SYNDROME: ICD-10-CM

## 2024-11-27 DIAGNOSIS — K50.019 CROHN'S DISEASE OF SMALL INTESTINE WITH COMPLICATION (HCC): ICD-10-CM

## 2024-11-27 DIAGNOSIS — R76.0 POSITIVE CARDIOLIPIN ANTIBODIES: ICD-10-CM

## 2024-11-27 DIAGNOSIS — R00.2 PALPITATIONS: ICD-10-CM

## 2024-11-27 DIAGNOSIS — I48.0 PAROXYSMAL ATRIAL FIBRILLATION (HCC): ICD-10-CM

## 2024-11-27 DIAGNOSIS — I87.2 CHRONIC VENOUS INSUFFICIENCY: ICD-10-CM

## 2024-11-27 DIAGNOSIS — Z86.718 HISTORY OF DVT (DEEP VEIN THROMBOSIS): ICD-10-CM

## 2024-11-27 DIAGNOSIS — Z93.2 ILEOSTOMY IN PLACE (HCC): ICD-10-CM

## 2024-11-27 DIAGNOSIS — R63.6 UNDERWEIGHT (BMI < 18.5): ICD-10-CM

## 2024-11-27 DIAGNOSIS — I47.10 SVT (SUPRAVENTRICULAR TACHYCARDIA) (HCC): ICD-10-CM

## 2024-11-27 DIAGNOSIS — Z53.09 CONTRAINDICATION TO ANTIPLATELET THERAPY: ICD-10-CM

## 2024-11-27 LAB — EKG IMPRESSION: NORMAL

## 2024-11-27 PROCEDURE — 99213 OFFICE O/P EST LOW 20 MIN: CPT | Performed by: INTERNAL MEDICINE

## 2024-11-27 PROCEDURE — 93005 ELECTROCARDIOGRAM TRACING: CPT | Performed by: INTERNAL MEDICINE

## 2024-11-27 ASSESSMENT — FIBROSIS 4 INDEX: FIB4 SCORE: 2.76

## 2024-11-27 NOTE — PATIENT INSTRUCTIONS
-Amlodipine is a calcium channel blocker that people can take for Raynaud's to help improve blood flow.  Sometimes leg swelling can get worse but is cosmetic only.  Sometimes you get mild constipation.  Some people can get a vascular rash or bleeding gums on this medicine.  However, if it helps you feel better in terms of the Raynaud's, definitely take it    -Metoprolol is a great medicine to try to chill out the heart rate.  You can take up to 100 mg twice daily.  This means it anytime you can increase your dose from 1 tablet twice daily to 2 tablets twice daily or just simply take an additional 1 tablet as needed.    -If you need to get back into cardiology clinic earlier with me, send a ARPU message and we will get you in.

## 2024-11-27 NOTE — LETTER
[facility]   [address]   [address]  [place] [address]  Phone: [phone]  Fax: [phone]              Jana Overton  1955    Encounter Date: 11/27/2024    Kori Oshea M.D.          PROGRESS NOTE:        Medical decision making:  -Very complex situation but overall things are managed relatively well.  -Diagnosis of PAF predates 2017.  We have not seen PAF.  Chest vascular score is only 2.  She cannot tolerate antiplatelet therapy.  We will continue with beta-blocker alone for now.  I do not think we should trial chronic anticoagulation.  I would place a loop recorder prior to considering anticoagulation, particularly with Crohn's disease and senile purpura and prior thrombocytopenia.  The only reason for full anticoagulation would be recurrent DVT.  -Also prone to SVT which is more likely the cause of her palpitations.  No she can take additional doses of beta-blocker.  -Today, sinus tachycardia is related to a flare in her Crohn's disease and significant discomfort.  She is treated for chronic pain syndrome related to debilitating Crohn's.  -She knows she is underweight and doing her best to gain weight when she can.  -Unsure regarding her lower extremity edema, could be that she is malnourished or CVI.  Wearing compression socks.  Using Lasix and potassium only from time to time.  Not sure why metolazone was on her medication list but we have removed it.  I did discuss and confirm with her PCP Dr. Charles that this is the right thing to do.  -Spironolactone helping with low potassium.  -Agree with trial of amlodipine to see if this helps with Raynaud's.  Precautions given for potential side effects.  -Blood pressure has not been an issue, it is appropriately elevated when she is stressed or in pain.  -She does not need statin therapy, calcium score previously was 0.  -From the  standpoint of cardiology, things are fairly well optimized.  I am on standby should anything change suddenly, we will get her back in, otherwise RTC in 1 year.      Problem list:  1. Paroxysmal atrial fibrillation (HCC)    2. SVT (supraventricular tachycardia) (HCC)  - EKG    3. Palpitations    4. Chronic kidney disease, stage 3b    5. Contraindication to antiplatelet therapy    6. Positive cardiolipin antibodies (IgA and IgG anti-CL)    7. History of DVT (deep vein thrombosis)    8. Crohn's disease of small intestine with complication (HCC)    9. Chronic venous insufficiency    10. Ileostomy in place (HCC)    11. Senile purpura (HCC)    12. Chronic pain syndrome    13. Underweight (BMI < 18.5)     Chief Complaint:   Chief Complaint   Patient presents with   • Follow-Up     Chronic kidney disease, stage 3b (HCC)       • Hypertension     History of Present Illness:  Jana Overton is a 69 y.o. female who returns for long-term management of PAF, SVT, palpitations, hypertension, history of DVT, Crohn's disease, medication intolerance, contraindication to antiplatelet, underweight.    Last seen by Dr. Harvey Monahan 12/11/2023.  Complex situation with chronic medical disease and multiple intolerance to medications.    Has had PAF, SVT, intermittent palpitations.  Her symptoms very depending on her underlying health, particularly related to her Crohn's disease which is a frequently moving target.  Diagnosis of PAF predates myself and Dr. Harvey Monahan.  Previously followed by Dr. Russo in 2015.  We have not seen atrial fibrillation in recent years. Zio patch heart monitor in 2020 without PAF.  Has been on metoprolol to help keep the fast heart rates away.  CHADS2 vascular score is only 2.  She is not able to tolerate anticoagulation.  She has bothersome senile purpura.  Aspirin the past causes significant GI upset.  Previous thrombocytopenia as well.    Has been on diuretics because of lower extremity edema, unclear  if it is chronic venous insufficiency or why she retained it so much in the legs and unclear if is related to autoimmune type vascular disease or perhaps malnutrition.  In the end she was on furosemide and losing potassium so started on spironolactone.  Using furosemide as needed for diuretic along with potassium replacement.    Can get chest pains but able to exercise.  Nitroglycerin never helped.  Currently not needing to try medications to control the situation.    Has been battling with Crohn's disease since the age of 12.  Currently in a very terrible flare.  Also has achalasia.  Working with a new gastroenterologist.  Has been evaluated at [facility] in [place] in the past.  Losing significant weight.  Current BMI 17.7.  She has been on TPN in the past.    Sees rheumatology for elevated markers.  On hydroxychloroquine.  Has Raynaud's.  On amlodipine.    Can have elevated blood pressure during pain and distress.  Can be associated with shortness of breath.  Nitroglycerin never helped.  Blood pressure can be low at times.  No targeted medications for hypertension.    Previously elevated lipids with LDL and triglycerides but more recently cholesterol is back in the normal range.  Calcium score of 0 in 2021.  Prior normal nuclear stress test.  Echocardiogram unremarkable.    Reactive airway disease, has levo-albuterol.  Tolerates beta-blockers without issue.    History of DVTs.  Can get swelling in the legs, recent ultrasound without recurrence of DVT.    No concerning family history.  Father had a coronary stent later in life.  Mother had angina, MI later in life.     No prior smoking history.  No history of diabetes.  No history of chest radiation or cardiotoxic chemotherapy.  No chronic kidney disease.  No ETOH overuse.  No caffeine overuse.  No recreation substance use.  No hx asthma.  No Covid.     Lives in [place].   to Goyo Joyce. They raised a foster son.  Her  has been on an extended trip out of  town for greater than 150 days.    Wt Readings from Last 5 Encounters:   11/27/24 59.1 kg (130 lb 6.4 oz)   11/25/24 60 kg (132 lb 3.2 oz)   10/11/24 57.8 kg (127 lb 6.4 oz)   09/12/24 59 kg (130 lb)   08/20/24 59.4 kg (131 lb)       DATA REVIEWED by me:  ECG (my personal interpretation)  11/27/2024 sinus tach he, 108, possible biatrial lodgment, possible RVH, delayed R wave progression, nonspecific ST depression    Ziopatch   12/2020  * 13 days and 9 hours of ziopatch recordings reviewed.   * Most symptoms associated with sinus rhythm in the 80s, however one episode was associated with heart rates up to 180s. Read as sinus by computer, but based on tracings I believe this was a supraventricular tachycardia. These were on 11/28 at around 2:35PM. Correlate clinically.   * No clear atrial fibrillation.   * Rare PACs/PVCs   * No ventricular tachycardia, automated read incorrectly interpreted artifact.      Echo   8-2021  Normal left ventricular systolic function.   No evidence of valvular abnormality based on Doppler evaluation.   Compared to the images of the prior study done 12/27/17 -  there has   been no significant change.   12-27-17  1. Normal right and left ventricular size and function. Normal regional   wall motion  2. Abnormal septal motion consistent with RV volume overload, however   estimated right atrial pressure by IVC assessment is normal.  3. No significant valve disease or flow abnormalities.   4. Unable to estimate pulmonary artery pressure due to an inadequate   tricuspid regurgitant jet.  Compared to the images of the study done 4/3/13 - there has been no   significant change.        PET   2/5/2018:  ELECTROCARDIOGRAPHIC FINDINGS:  EKG review shows a normal sinus rhythm with no ischemic ST segment changes at rest or stress.  SCINTOGRAPHIC FINDINGS:  There is normal homogenous tracer uptake at rest and stress.  GATED WALL MOTION FINDINGS:  Show an ejection fraction of 72% at rest, which increases to  78% at peak stress.  CONCLUSIONS AND IMPRESSION:  Normal cardiac stress test.      Nuclear Perfusion Imaging     1.  There were no new ECG changes and no arrhythmia.    2.  Heart rate did increase to 92 and the blood pressure negrito somewhat to   162/77 after an initial drop to 117 systolic but then returned to baseline.  3.  The raw data demonstrated no unexpected extracardiac isotope uptake,   fairly dense collection of isotope in the right mid to lower abdomen.  4.  Rest-stress image comparison reveals no perfusion abnormalities in any of   the multiple SPECT image panels.  5.  The gated wall motion study demonstrated low cardiac volumes with very   small end systolic volume.  The global contractility was normal, EF 74%.    Regional contractility also normal.  SUMMARY:  Lexiscan stress myocardial perfusion stress imaging with technetium   99m tetrofosmin demonstratin.  No infarct or ischemia.  2.  Normal global and hyperdynamic regional function, EF 74%.  3.  No ECG changes or arrhythmia.  4.  No prior for comparison.      CCS 2021:  Coronary calcification:  LMA - 0.0  LCX - 0.0  LAD - 0.0  RCA - 0.0  PDA - 0.0  Total Calcium Score: 0.0    US Abd  1. No evidence of bilateral lower extremity deep venous thrombosis.     Most recent labs:       Lab Results   Component Value Date/Time    WBC 9.0 2024 03:50 AM    WBC 7.9 02/10/2010 04:04 PM    HEMOGLOBIN 12.0 2024 03:50 AM    HEMATOCRIT 36.2 (L) 2024 03:50 AM    MCV 95.3 2024 03:50 AM    MCV 86 02/10/2010 04:04 PM    INR 1.19 (H) 10/24/2022 01:19 AM    TSH 0.922 10/20/2010 11:00 AM      Lab Results   Component Value Date/Time    SODIUM 136 2024 03:50 AM    POTASSIUM 3.9 2024 03:50 AM    CHLORIDE 101 2024 03:50 AM    CO2 23 2024 03:50 AM    GLUCOSE 89 2024 03:50 AM    BUN 27 (H) 2024 03:50 AM    CREATININE 1.09 2024 03:50 AM    CREATININE 0.89 02/10/2010 04:04 PM    GLOMRATE >59 02/10/2010 04:04  "PM      Lab Results   Component Value Date/Time    ASTSGOT 50 (H) 07/18/2024 03:50 AM    ALTSGPT 38 07/18/2024 03:50 AM    ALBUMIN 3.6 07/18/2024 03:50 AM    ALBUMIN 3.83 11/01/2021 07:53 AM      Lab Results   Component Value Date/Time    CHOLSTRLTOT 194 05/23/2022 02:36 AM    LDL 90 05/23/2022 02:36 AM    HDL 81 05/23/2022 02:36 AM    TRIGLYCERIDE 113 05/23/2022 02:36 AM     No results for input(s): \"NTPROBNP\", \"TROPONINT\" in the last 72 hours.      Past Medical History:   Diagnosis Date   • Anesthesia     Difficult IV stick   • Anxiety    • Arthritis     all over   • ASTHMA    • Atrial fib/flut    • Backpain    • Bronchitis     5 years   • Chronic pain    • Colostomy in place ([facility])    • COPD (chronic obstructive pulmonary disease) (Formerly [facility])    • Crohn's disease of colon ([facility]ly [facility])    • Dyspnea    • History of cardiac murmur    • Ileostomy present ([facility])    • Infectious disease     MRSA, VancoRSA   • Multiple falls    • Narcotic dependence ([facility])    • Obstruction    • Pain    • Pneumonia     child and mid 30's   • Postherpetic neuralgia    • Rosacea    • Sleep apnea      Past Surgical History:   Procedure Laterality Date   • IA UPPER GI ENDOSCOPY,DIAGNOSIS N/A 2/8/2023    Procedure: GASTROSCOPY;  Surgeon: Jamarcus Davalos M.D.;  Location: SURGERY SAME DAY [facility];  Service: Gastroenterology   • IA UPPER GI ENDOSCOPY,W/DILAT,GASTRIC OUT N/A 2/8/2023    Procedure: GASTROSCOPY, WITH BALLOON DILATION;  Surgeon: Jamarcus Davalos M.D.;  Location: SURGERY SAME DAY [facility];  Service: Gastroenterology   • IA UPPER GI ENDOSCOPY,BIOPSY N/A 2/8/2023    Procedure: GASTROSCOPY, WITH BIOPSY;  Surgeon: Jamarcus Davalos M.D.;  Location: SURGERY SAME DAY [facility];  Service: Gastroenterology   • IA UPPER GI ENDOSCOPY,DIAGNOSIS N/A 1/16/2023    Procedure: GASTROSCOPY;  Surgeon: Jamarcus Davalos M.D.;  Location: SURGERY SAME DAY [facility];  Service: Gastroenterology   • IA UPPER GI ENDOSCOPY,W/DILAT,GASTRIC OUT N/A 1/16/2023    Procedure: GASTROSCOPY, " WITH BALLOON DILATION;  Surgeon: Jamarcus Davalos M.D.;  Location: SURGERY SAME DAY [facility];  Service: Gastroenterology   • LA UPPER GI ENDOSCOPY,BIOPSY N/A 1/16/2023    Procedure: GASTROSCOPY, WITH BIOPSY;  Surgeon: Jamarcus Davalos M.D.;  Location: SURGERY SAME DAY [facility];  Service: Gastroenterology   • LA UPPER GI ENDOSCOPY,DIAGNOSIS N/A 10/24/2022    Procedure: GASTROSCOPY;  Surgeon: Jamarcus Davalos M.D.;  Location: SURGERY SAME DAY [facility];  Service: Gastroenterology   • BILIARY DILATATION N/A 10/24/2022    Procedure: DILATION, STRICTURE, BILE DUCT;  Surgeon: Jamarcus Davalos M.D.;  Location: SURGERY SAME DAY [facility];  Service: Gastroenterology   • LA CYSTOSCOPY,INSERT URETERAL STENT Right 12/23/2021    Procedure: CYSTOSCOPY, WITH URETERAL STENT EXCHANGE;  Surgeon: Jonathan Turcios M.D.;  Location: [facility];  Service: Urology   • LA CYSTO/URETERO/PYELOSCOPY, DX Right 12/23/2021    Procedure: URETEROSCOPY;  Surgeon: Jonathan Turcios M.D.;  Location: [facility];  Service: Urology   • LA CYSTO/URETERO/PYELOSCOPY, DX Right 12/8/2021    Procedure: URETEROSCOPY;  Surgeon: Luc Hook M.D.;  Location: [facility];  Service: Urology   • CYSTOSCOPY WITH URETERAL STENT INSERTION OR REMOVAL Right 12/8/2021    Procedure: CYSTOSCOPY, WITH URETERAL STENT INSERTION;  Surgeon: Luc Hook M.D.;  Location: [facility];  Service: Urology   • ILEOSTOMY  1/20/2021    Procedure: ILEOSTOMY- COMPLEX REVISION,;  Surgeon: Kevin Cruz M.D.;  Location: [facility];  Service: General   • LYSIS ADHESIONS GENERAL  1/20/2021    Procedure: LYSIS, ADHESIONS;  Surgeon: Kevin Cruz M.D.;  Location: [facility];  Service: General   • GASTROSCOPY N/A 5/22/2019    Procedure: GASTROSCOPY - WITH DILATION;  Surgeon: Dionicio Cardona M.D.;  Location: [facility];  Service: Gastroenterology   • GASTROSCOPY  1/6/2019    Procedure: GASTROSCOPY- WITH DILATION;  Surgeon:  Daniel Daniels M.D.;  Location: SURGERY [facility];  Service: Gastroenterology   • ILEOSTOMY  12/29/2018    Procedure: ILEOSTOMY- REVISION;  Surgeon: Kevin Cruz M.D.;  Location: SURGERY [facility];  Service: General   • SIGMOIDOSCOPY FLEX  12/29/2018    Procedure: SIGMOIDOSCOPY FLEX;  Surgeon: Kevin Cruz M.D.;  Location: SURGERY [facility];  Service: General   • EXPLORATORY LAPAROTOMY  12/29/2018    Procedure: EXPLORATORY LAPAROTOMY, lysis of adhesions;  Surgeon: Kevin Cruz M.D.;  Location: SURGERY [facility];  Service: General   • ILEOSTOMY  5/14/2014    Performed by Kevin Cruz M.D. at SURGERY [facility]   • MAMMOPLASTY REDUCTION  7/17/2013    Performed by Janey Burt M.D. at [facility]   • HARDWARE REMOVAL NEURO  7/16/2012    Performed by KEELEY KIM at SURGERY [facility]   • GASTROSCOPY  10/4/2011    Performed by TYSON MARTINEZ at SURGERY SAME DAY [facility]   • COLONOSCOPY  10/4/2011    Performed by TYSON MARTINEZ at SURGERY SAME DAY [facility]   • DILATION AND CURETTAGE  9/24/2010    Performed by GAURAV ALY at SURGERY SAME DAY [facility]   • ILEOSTOMY  11/11/2009    Performed by KEVIN CRUZ at SURGERY [facility]   • GASTROSCOPY WITH BIOPSY  11/22/08    Performed by NEGRITA HUYNH at [facility]   • ABDOMINAL EXPLORATION     • CERVICAL DISK AND FUSION ANTERIOR     • COLON RESECTION     • FOOT SURGERY     • HAND SURGERY     • LUMBAR FUSION ANTERIOR     • LUMPECTOMY     • OTHER ABDOMINAL SURGERY      surgery for chrons disease   • NV BREAST REDUCTION       Family History   Problem Relation Age of Onset   • Heart Disease Mother         Angina, MI later in life   • Lung Disease Mother         copd   • Diabetes Father    • Heart Disease Father    • Diabetes Sister    • Cancer Maternal Aunt 40        breast     Social History     Socioeconomic History   • Marital status:      Spouse name: Not on  file   • Number of children: Not on file   • Years of education: Not on file   • Highest education level: Associate degree: academic program   Occupational History   • Not on file   Tobacco Use   • Smoking status: Never   • Smokeless tobacco: Never   • Tobacco comments:     second hand smoke parents - smoked for only 1 year many years ago   Vaping Use   • Vaping status: Every Day   • Substances: THC   Substance and Sexual Activity   • Alcohol use: Not Currently     Alcohol/week: 0.6 oz     Types: 1 Shots of liquor per week     Comment: gin and half a lime; tonic water   • Drug use: Not Currently     Types: Marijuana, Inhaled     Comment: medical marijuana through bong/vape   • Sexual activity: Not on file     Comment:    Other Topics Concern   • Not on file   Social History Narrative   • Not on file     Social Drivers of Health     Financial Resource Strain: Unknown (1/15/2023)    Overall Financial Resource Strain (CARDIA)    • Difficulty of Paying Living Expenses: Patient declined   Food Insecurity: Unknown (1/15/2023)    Hunger Vital Sign    • Worried About Running Out of Food in the Last Year: Patient declined    • Ran Out of Food in the Last Year: Patient declined   Transportation Needs: Unknown (1/15/2023)    PRAPARE - Transportation    • Lack of Transportation (Medical): Patient declined    • Lack of Transportation (Non-Medical): Patient declined   Physical Activity: Sufficiently Active (1/15/2023)    Exercise Vital Sign    • Days of Exercise per Week: 7 days    • Minutes of Exercise per Session: 60 min   Stress: No Stress Concern Present (11/4/2021)    [employer] - Occupational Stress Questionnaire    • Feeling of Stress : Not at all   Social Connections: Socially Integrated (1/15/2023)    Social Connection and Isolation Panel [NHANES]    • Frequency of Communication with Friends and Family: Three times a week    • Frequency of Social Gatherings with Friends and Family:  "Once a week    • Attends [employer] Services: More than 4 times per year    • Active Member of Clubs or Organizations: Yes    • Attends Club or Organization Meetings: More than 4 times per year    • Marital Status:    Intimate Partner Violence: Not on file   Housing Stability: Unknown (1/15/2023)    Housing Stability Vital Sign    • Unable to Pay for Housing in the Last Year: Patient refused    • Number of Places Lived in the Last Year: 1    • Unstable Housing in the Last Year: No     Allergies   Allergen Reactions   • Demerol Hcl [Meperidine] Shortness of Breath and Palpitations   • Methotrexate Hives, Shortness of Breath and Rash         • Morphine Shortness of Breath and Palpitations   • Promethazine Shortness of Breath and Rash         • Remicade [Infliximab] Hives, Shortness of Breath and Rash         • Sudafed [Pseudoephedrine] Shortness of Breath, Vomiting and Palpitations   • Azathioprine Sodium Hives and Shortness of Breath     10/21/2022 - patient unable to verify allergy/reaction  Historical reaction listed: Hives, Shortness of Breath   • Carafate [Sucralfate] Vomiting and Nausea     + Mouth Sores   • Other Drug Unspecified     \"STEROIDS\" - REACTION NOT SPECIFIED   • Sulfa Drugs Rash         • Sulfasalazine Rash         • Gabapentin Cough, Hives, Itching, Nausea, Palpitations, Photosensitivity, Rash, Runny Nose, Swelling, Unspecified and Vomiting   • Nitroglycerin      Headache   • Prednisone      Other Reaction(s): Reaction:GI system trouble   • Amitriptyline Unspecified     Nightmares     • Ativan [Lorazepam] Unspecified     Nightmares   • Betamethasone Unspecified     10/21/2022 - patient unable to verify allergy/reaction  No historical reaction listed   • Fentanyl Unspecified     \"Patches didn't work\" and skin broke down   • Lyrica [Pregabalin] Nausea         • Methadone Unspecified     \"Didn't work\"   • Potassium Unspecified     Notes: In IV only  REACTION NOT SPECIFIED   • Tizanidine " Unspecified     10/21/2022 - patient unable to verify allergy/reaction  No historical reaction listed       Current Outpatient Medications   Medication Sig Dispense Refill   • Azelaic Acid 15 % Gel APPLY TOPICALLY TO FACE TWICE DAILY     • diazePAM (VALIUM) 5 MG Tab Take 1 Tablet by mouth every 6 hours as needed.     • ipratropium (ATROVENT) 0.06 % Solution      • Zinc Picolinate Powder Take 1 Tablet by mouth 2 times a day.     • [START ON 2/17/2025] oxyCODONE immediate release (ROXICODONE) 10 MG immediate release tablet Take 1 Tablet by mouth every 6 hours as needed for Moderate Pain (Refill #3 of #3) for up to 30 days. 90 Tablet 0   • [START ON 1/18/2025] oxyCODONE immediate release (ROXICODONE) 10 MG immediate release tablet Take 1 Tablet by mouth every four hours as needed for Moderate Pain (Refill #2 of #3) for up to 30 days. 90 Tablet 0   • [START ON 12/19/2024] oxyCODONE immediate release (ROXICODONE) 10 MG immediate release tablet Take 1 Tablet by mouth every 6 hours as needed for Moderate Pain (refill #1 of #3) for up to 30 days. 90 Tablet 0   • amLODIPine (NORVASC) 2.5 MG Tab Take 1 Tablet by mouth every day. 90 Tablet 3   • amoxicillin-clavulanate (AUGMENTIN) 875-125 MG Tab Take 1 Tablet by mouth 2 times a day. 14 Tablet 0   • spironolactone (ALDACTONE) 25 MG Tab Take 1 Tablet by mouth every day. **LAST SEEN 12/2023 .  TO ENSURE FURTHER REFILLS, KEEP SCHEDULED FOLLOW UP VISIT 11/27/2024.** 90 Tablet 0   • traZODone (DESYREL) 50 MG Tab TAKE 1 TABLET BY MOUTH EVERY EVENING 90 Tablet 3   • cetirizine (ZYRTEC) 1 MG/ML Solution oral solution Take 10 mL by mouth every day. 150 mL 3   • potassium chloride SA (KDUR) 20 MEQ Tab CR Take 1 Tablet by mouth 2 times a day. 180 Tablet 1   • hydroxychloroquine (PLAQUENIL) 200 MG Tab Take 1 Tablet by mouth every day. 90 Tablet 3   • furosemide (LASIX) 20 MG Tab Take 1 Tablet by mouth every day. 100 Tablet 0   • ondansetron (ZOFRAN ODT) 4 MG TABLET DISPERSIBLE Take 1  Tablet by mouth every 6 hours as needed for Nausea/Vomiting. 20 Tablet 5   • metoprolol tartrate (LOPRESSOR) 50 MG Tab Take 1 Tablet by mouth 2 times a day. 180 Tablet 3   • lidocaine (ASPERFLEX) 4 % Patch Place 1 Patch on the skin every 24 hours. 30 Patch 2   • Azelastine (ASTELIN) 137 MCG/SPRAY Solution Administer 1 Spray into each nostril at bedtime.     • fluticasone (FLONASE) 50 MCG/ACT nasal spray Administer 1 Spray into each nostril at bedtime.     • nystatin (MYCOSTATIN) 581809 UNIT/ML Suspension Take 5 mL by mouth 4 times a day. 60 mL 0   • prochlorperazine (COMPAZINE) 10 MG Tab Take 1 Tablet by mouth every 6 hours as needed for Nausea/Vomiting. 30 Tablet 3   • granisetron (KYTRIL) 1 MG Tab Take 1 Tablet by mouth every 12 hours as needed for Nausea/Vomiting. 10 Tablet 0   • levalbuterol (XOPENEX HFA) 45 MCG/ACT inhaler Inhale 2 Puffs every four hours as needed for Shortness of Breath (As needed for shortness of breath, cough, wheezing.). 1 Each 11   • Ascorbic Acid (VITAMIN C PO) Take 1 Tablet by mouth 2 times a day.     • Biotin w/ Vitamins C & E (HAIR SKIN & NAILS GUMMIES PO) Take 1 Tablet by mouth 2 times a day.     • naratriptan (AMERGE) 2.5 MG tablet Take 1 Tablet by mouth as needed for Migraine. 5 Tablet 3     No current facility-administered medications for this visit.       ROS  All others systems reviewed and negative.    /78 (BP Location: Left arm, Patient Position: Sitting, BP Cuff Size: Adult)   Pulse (!) 103   Resp 14   Ht 1.829 m (6')   Wt 59.1 kg (130 lb 6.4 oz)   SpO2 94%  Body mass index is 17.69 kg/m².    General: No acute distress. Thin  HEENT: EOM grossly intact, no scleral icterus, no pharyngeal erythema.   Neck:  No JVD at 90, no bruits, trachea midline  CVS: Fast, regular. Normal S1, S2. No M/R/G. No LE edema, compression socks.  2+ radial pulses, 2+ PT pulses  Resp: CTAB. No wheezing or crackles/rhonchi. Normal respiratory effort.  Abdomen: Soft, NT, no marii  hepatomegaly.  MSK/Ext: No clubbing or cyanosis.  Skin: Warm and dry, no rashes.  Neurological: CN III-XII grossly intact. No focal deficits.   Psych: A&O x 3, appropriate affect, good judgement    Return in about 1 year (around 11/27/2025).    Written instructions given today:      -Amlodipine is a calcium channel blocker that people can take for Raynaud's to help improve blood flow.  Sometimes leg swelling can get worse but is cosmetic only.  Sometimes you get mild constipation.  Some people can get a vascular rash or bleeding gums on this medicine.  However, if it helps you feel better in terms of the Raynaud's, definitely take it    -Metoprolol is a great medicine to try to chill out the heart rate.  You can take up to 100 mg twice daily.  This means it anytime you can increase your dose from 1 tablet twice daily to 2 tablets twice daily or just simply take an additional 1 tablet as needed.    -If you need to get back into cardiology clinic earlier with me, send a [employer] message and we will get you in.    It is my pleasure to participate in the care of Ms. Overton.  Please do not hesitate to contact me with questions or concerns.    Kori Oshea MD, [facility]  Cardiologist, [facility]    Please note that this dictation was created using voice recognition software. I have made every reasonable attempt to correct obvious errors, but it is possible there are errors of grammar and possibly content that I did not discover before finalizing the note.      Chase Charles D.O.  [address] [id number]  Via In Basket

## 2024-11-27 NOTE — PROGRESS NOTES
Medical decision making:  -Very complex situation but overall things are managed relatively well.  -Diagnosis of PAF predates 2017.  We have not seen PAF.  Chest vascular score is only 2.  She cannot tolerate antiplatelet therapy.  We will continue with beta-blocker alone for now.  I do not think we should trial chronic anticoagulation.  I would place a loop recorder prior to considering anticoagulation, particularly with Crohn's disease and senile purpura and prior thrombocytopenia.  The only reason for full anticoagulation would be recurrent DVT.  -Also prone to SVT which is more likely the cause of her palpitations.  No she can take additional doses of beta-blocker.  -Today, sinus tachycardia is related to a flare in her Crohn's disease and significant discomfort.  She is treated for chronic pain syndrome related to debilitating Crohn's.  -She knows she is underweight and doing her best to gain weight when she can.  -Unsure regarding her lower extremity edema, could be that she is malnourished or CVI.  Wearing compression socks.  Using Lasix and potassium only from time to time.  Not sure why metolazone was on her medication list but we have removed it.  I did discuss and confirm with her PCP Dr. Charles that this is the right thing to do.  -Spironolactone helping with low potassium.  -Agree with trial of amlodipine to see if this helps with Raynaud's.  Precautions given for potential side effects.  -Blood pressure has not been an issue, it is appropriately elevated when she is stressed or in pain.  -She does not need statin therapy, calcium score previously was 0.  -From the standpoint of cardiology, things are fairly well optimized.  I am on standby should anything change suddenly, we will get her back in, otherwise RTC in 1 year.      Problem list:  1. Paroxysmal atrial fibrillation (HCC)    2. SVT (supraventricular tachycardia) (HCC)  - EKG    3. Palpitations    4. Chronic kidney disease, stage  3b    5. Contraindication to antiplatelet therapy    6. Positive cardiolipin antibodies (IgA and IgG anti-CL)    7. History of DVT (deep vein thrombosis)    8. Crohn's disease of small intestine with complication (HCC)    9. Chronic venous insufficiency    10. Ileostomy in place (HCC)    11. Senile purpura (HCC)    12. Chronic pain syndrome    13. Underweight (BMI < 18.5)     Chief Complaint:   Chief Complaint   Patient presents with    Follow-Up     Chronic kidney disease, stage 3b (HCC)        Hypertension     History of Present Illness:  Jana Overton is a 69 y.o. female who returns for long-term management of PAF, SVT, palpitations, hypertension, history of DVT, Crohn's disease, medication intolerance, contraindication to antiplatelet, underweight.    Last seen by Dr. Harvey Monahan 12/11/2023.  Complex situation with chronic medical disease and multiple intolerance to medications.    Has had PAF, SVT, intermittent palpitations.  Her symptoms very depending on her underlying health, particularly related to her Crohn's disease which is a frequently moving target.  Diagnosis of PAF predates myself and Dr. Harvey Monahan.  Previously followed by Dr. Russo in 2015.  We have not seen atrial fibrillation in recent years. Zio patch heart monitor in 2020 without PAF.  Has been on metoprolol to help keep the fast heart rates away.  CHADS2 vascular score is only 2.  She is not able to tolerate anticoagulation.  She has bothersome senile purpura.  Aspirin the past causes significant GI upset.  Previous thrombocytopenia as well.    Has been on diuretics because of lower extremity edema, unclear if it is chronic venous insufficiency or why she retained it so much in the legs and unclear if is related to autoimmune type vascular disease or perhaps malnutrition.  In the end she was on furosemide and losing potassium so started on spironolactone.  Using furosemide as needed for diuretic along with potassium  replacement.    Can get chest pains but able to exercise.  Nitroglycerin never helped.  Currently not needing to try medications to control the situation.    Has been battling with Crohn's disease since the age of 12.  Currently in a very terrible flare.  Also has achalasia.  Working with a new gastroenterologist.  Has been evaluated at [facility] in [place] in the past.  Losing significant weight.  Current BMI 17.7.  She has been on TPN in the past.    Sees rheumatology for elevated markers.  On hydroxychloroquine.  Has Raynaud's.  On amlodipine.    Can have elevated blood pressure during pain and distress.  Can be associated with shortness of breath.  Nitroglycerin never helped.  Blood pressure can be low at times.  No targeted medications for hypertension.    Previously elevated lipids with LDL and triglycerides but more recently cholesterol is back in the normal range.  Calcium score of 0 in 2021.  Prior normal nuclear stress test.  Echocardiogram unremarkable.    Reactive airway disease, has levo-albuterol.  Tolerates beta-blockers without issue.    History of DVTs.  Can get swelling in the legs, recent ultrasound without recurrence of DVT.    No concerning family history.  Father had a coronary stent later in life.  Mother had angina, MI later in life.     No prior smoking history.  No history of diabetes.  No history of chest radiation or cardiotoxic chemotherapy.  No chronic kidney disease.  No ETOH overuse.  No caffeine overuse.  No recreation substance use.  No hx asthma.  No Covid.     Lives in [place].   to Goyo Joyce. They raised a foster son.  Her  has been on an extended trip out of town for greater than 150 days.    Wt Readings from Last 5 Encounters:   11/27/24 59.1 kg (130 lb 6.4 oz)   11/25/24 60 kg (132 lb 3.2 oz)   10/11/24 57.8 kg (127 lb 6.4 oz)   09/12/24 59 kg (130 lb)   08/20/24 59.4 kg (131 lb)       DATA REVIEWED by me:  ECG (my personal interpretation)  11/27/2024 sinus tach he,  108, possible biatrial lodgment, possible RVH, delayed R wave progression, nonspecific ST depression    Ziopatch   12/2020  * 13 days and 9 hours of ziopatch recordings reviewed.   * Most symptoms associated with sinus rhythm in the 80s, however one episode was associated with heart rates up to 180s. Read as sinus by computer, but based on tracings I believe this was a supraventricular tachycardia. These were on 11/28 at around 2:35PM. Correlate clinically.   * No clear atrial fibrillation.   * Rare PACs/PVCs   * No ventricular tachycardia, automated read incorrectly interpreted artifact.      Echo   8-2021  Normal left ventricular systolic function.   No evidence of valvular abnormality based on Doppler evaluation.   Compared to the images of the prior study done 12/27/17 -  there has   been no significant change.   12-27-17  1. Normal right and left ventricular size and function. Normal regional   wall motion  2. Abnormal septal motion consistent with RV volume overload, however   estimated right atrial pressure by IVC assessment is normal.  3. No significant valve disease or flow abnormalities.   4. Unable to estimate pulmonary artery pressure due to an inadequate   tricuspid regurgitant jet.  Compared to the images of the study done 4/3/13 - there has been no   significant change.        PET   2/5/2018:  ELECTROCARDIOGRAPHIC FINDINGS:  EKG review shows a normal sinus rhythm with no ischemic ST segment changes at rest or stress.  SCINTOGRAPHIC FINDINGS:  There is normal homogenous tracer uptake at rest and stress.  GATED WALL MOTION FINDINGS:  Show an ejection fraction of 72% at rest, which increases to 78% at peak stress.  CONCLUSIONS AND IMPRESSION:  Normal cardiac stress test.      Nuclear Perfusion Imaging   2013  1.  There were no new ECG changes and no arrhythmia.    2.  Heart rate did increase to 92 and the blood pressure negrito somewhat to   162/77 after an initial drop to 117 systolic but then returned to  baseline.  3.  The raw data demonstrated no unexpected extracardiac isotope uptake,   fairly dense collection of isotope in the right mid to lower abdomen.  4.  Rest-stress image comparison reveals no perfusion abnormalities in any of   the multiple SPECT image panels.  5.  The gated wall motion study demonstrated low cardiac volumes with very   small end systolic volume.  The global contractility was normal, EF 74%.    Regional contractility also normal.  SUMMARY:  Lexiscan stress myocardial perfusion stress imaging with technetium   99m tetrofosmin demonstratin.  No infarct or ischemia.  2.  Normal global and hyperdynamic regional function, EF 74%.  3.  No ECG changes or arrhythmia.  4.  No prior for comparison.      CCS 2021:  Coronary calcification:  LMA - 0.0  LCX - 0.0  LAD - 0.0  RCA - 0.0  PDA - 0.0  Total Calcium Score: 0.0    US Abd  1. No evidence of bilateral lower extremity deep venous thrombosis.     Most recent labs:       Lab Results   Component Value Date/Time    WBC 9.0 2024 03:50 AM    WBC 7.9 02/10/2010 04:04 PM    HEMOGLOBIN 12.0 2024 03:50 AM    HEMATOCRIT 36.2 (L) 2024 03:50 AM    MCV 95.3 2024 03:50 AM    MCV 86 02/10/2010 04:04 PM    INR 1.19 (H) 10/24/2022 01:19 AM    TSH 0.922 10/20/2010 11:00 AM      Lab Results   Component Value Date/Time    SODIUM 136 2024 03:50 AM    POTASSIUM 3.9 2024 03:50 AM    CHLORIDE 101 2024 03:50 AM    CO2 23 2024 03:50 AM    GLUCOSE 89 2024 03:50 AM    BUN 27 (H) 2024 03:50 AM    CREATININE 1.09 2024 03:50 AM    CREATININE 0.89 02/10/2010 04:04 PM    GLOMRATE >59 02/10/2010 04:04 PM      Lab Results   Component Value Date/Time    ASTSGOT 50 (H) 2024 03:50 AM    ALTSGPT 38 2024 03:50 AM    ALBUMIN 3.6 2024 03:50 AM    ALBUMIN 3.83 2021 07:53 AM      Lab Results   Component Value Date/Time    CHOLSTRLTOT 194 2022 02:36 AM    LDL 90 2022 02:36 AM    HDL 81  "05/23/2022 02:36 AM    TRIGLYCERIDE 113 05/23/2022 02:36 AM     No results for input(s): \"NTPROBNP\", \"TROPONINT\" in the last 72 hours.      Past Medical History:   Diagnosis Date    Anesthesia     Difficult IV stick    Anxiety     Arthritis     all over    ASTHMA     Atrial fib/flut     Backpain     Bronchitis     5 years    Chronic pain     Colostomy in place ([facility])     COPD (chronic obstructive pulmonary disease) ([facility])     Crohn's disease of colon ([facility])     Dyspnea     History of cardiac murmur     Ileostomy present ([facility])     Infectious disease     MRSA, VancoRSA    Multiple falls     Narcotic dependence ([facility])     Obstruction     Pain     Pneumonia     child and mid 30's    Postherpetic neuralgia     Rosacea     Sleep apnea      Past Surgical History:   Procedure Laterality Date    SD UPPER GI ENDOSCOPY,DIAGNOSIS N/A 2/8/2023    Procedure: GASTROSCOPY;  Surgeon: Jamarcus Davalos M.D.;  Location: SURGERY SAME DAY [facility];  Service: Gastroenterology    SD UPPER GI ENDOSCOPY,W/DILAT,GASTRIC OUT N/A 2/8/2023    Procedure: GASTROSCOPY, WITH BALLOON DILATION;  Surgeon: Jamarcus Davalos M.D.;  Location: SURGERY SAME DAY [facility];  Service: Gastroenterology    SD UPPER GI ENDOSCOPY,BIOPSY N/A 2/8/2023    Procedure: GASTROSCOPY, WITH BIOPSY;  Surgeon: Jamarcus Davalos M.D.;  Location: SURGERY SAME DAY [facility];  Service: Gastroenterology    SD UPPER GI ENDOSCOPY,DIAGNOSIS N/A 1/16/2023    Procedure: GASTROSCOPY;  Surgeon: Jamarcus Davalos M.D.;  Location: SURGERY SAME DAY [facility];  Service: Gastroenterology    SD UPPER GI ENDOSCOPY,W/DILAT,GASTRIC OUT N/A 1/16/2023    Procedure: GASTROSCOPY, WITH BALLOON DILATION;  Surgeon: Jamarcus Davalos M.D.;  Location: SURGERY SAME DAY [facility];  Service: Gastroenterology    SD UPPER GI ENDOSCOPY,BIOPSY N/A 1/16/2023    Procedure: GASTROSCOPY, WITH BIOPSY;  Surgeon: Jamarcus Davalos M.D.;  Location: SURGERY SAME DAY [facility];  Service: Gastroenterology    SD UPPER GI ENDOSCOPY,DIAGNOSIS N/A " 10/24/2022    Procedure: GASTROSCOPY;  Surgeon: Jamarcus Davalos M.D.;  Location: SURGERY SAME DAY [facility];  Service: Gastroenterology    BILIARY DILATATION N/A 10/24/2022    Procedure: DILATION, STRICTURE, BILE DUCT;  Surgeon: Jamarcus Davalos M.D.;  Location: SURGERY SAME DAY [facility];  Service: Gastroenterology    UT CYSTOSCOPY,INSERT URETERAL STENT Right 12/23/2021    Procedure: CYSTOSCOPY, WITH URETERAL STENT EXCHANGE;  Surgeon: Jonathan Turcios M.D.;  Location: [facility];  Service: Urology    UT CYSTO/URETERO/PYELOSCOPY, DX Right 12/23/2021    Procedure: URETEROSCOPY;  Surgeon: Jonathan Turcios M.D.;  Location: [facility];  Service: Urology    UT CYSTO/URETERO/PYELOSCOPY, DX Right 12/8/2021    Procedure: URETEROSCOPY;  Surgeon: Luc Hook M.D.;  Location: [facility];  Service: Urology    CYSTOSCOPY WITH URETERAL STENT INSERTION OR REMOVAL Right 12/8/2021    Procedure: CYSTOSCOPY, WITH URETERAL STENT INSERTION;  Surgeon: Luc Hook M.D.;  Location: [facility];  Service: Urology    ILEOSTOMY  1/20/2021    Procedure: ILEOSTOMY- COMPLEX REVISION,;  Surgeon: Kevin Cruz M.D.;  Location: [facility];  Service: General    LYSIS ADHESIONS GENERAL  1/20/2021    Procedure: LYSIS, ADHESIONS;  Surgeon: Kevin Cruz M.D.;  Location: [facility];  Service: General    GASTROSCOPY N/A 5/22/2019    Procedure: GASTROSCOPY - WITH DILATION;  Surgeon: Dionicio Cardona M.D.;  Location: [facility];  Service: Gastroenterology    GASTROSCOPY  1/6/2019    Procedure: GASTROSCOPY- WITH DILATION;  Surgeon: Daniel Daniels M.D.;  Location: [facility];  Service: Gastroenterology    ILEOSTOMY  12/29/2018    Procedure: ILEOSTOMY- REVISION;  Surgeon: Kevin Cruz M.D.;  Location: [facility];  Service: General    SIGMOIDOSCOPY FLEX  12/29/2018    Procedure: SIGMOIDOSCOPY FLEX;  Surgeon: Kevin Cruz M.D.;  Location: SURGERY [facility];  Service: General    EXPLORATORY LAPAROTOMY  12/29/2018    Procedure: EXPLORATORY LAPAROTOMY, lysis of adhesions;  Surgeon: Kevin Cruz M.D.;  Location: SURGERY [facility];  Service: General    ILEOSTOMY  5/14/2014    Performed by Kevin Cruz M.D. at SURGERY [facility]    MAMMOPLASTY REDUCTION  7/17/2013    Performed by Janey Burt M.D. at SURGERY [facility]    HARDWARE REMOVAL NEURO  7/16/2012    Performed by KEELEY KIM at SURGERY [facility]    GASTROSCOPY  10/4/2011    Performed by TYSON MARTINEZ at SURGERY SAME DAY [facility]    COLONOSCOPY  10/4/2011    Performed by TYSON MARTINEZ at SURGERY SAME DAY [facility]    DILATION AND CURETTAGE  9/24/2010    Performed by GAURAV ALY at SURGERY SAME DAY [facility]    ILEOSTOMY  11/11/2009    Performed by KEVIN CRUZ at SURGERY [facility]    GASTROSCOPY WITH BIOPSY  11/22/08    Performed by NEGRITA HUYNH at SURGERY [facility]    ABDOMINAL EXPLORATION      CERVICAL DISK AND FUSION ANTERIOR      COLON RESECTION      FOOT SURGERY      HAND SURGERY      LUMBAR FUSION ANTERIOR      LUMPECTOMY      OTHER ABDOMINAL SURGERY      surgery for chrons disease    OH BREAST REDUCTION       Family History   Problem Relation Age of Onset    Heart Disease Mother         Angina, MI later in life    Lung Disease Mother         copd    Diabetes Father     Heart Disease Father     Diabetes Sister     Cancer Maternal Aunt 40        breast     Social History     Socioeconomic History    Marital status:      Spouse name: Not on file    Number of children: Not on file    Years of education: Not on file    Highest education level: Associate degree: academic program   Occupational History    Not on file   Tobacco Use    Smoking status: Never    Smokeless tobacco: Never    Tobacco comments:     second hand smoke parents - smoked for only 1 year many years ago   Vaping Use    Vaping status: Every Day     Substances: THC   Substance and Sexual Activity    Alcohol use: Not Currently     Alcohol/week: 0.6 oz     Types: 1 Shots of liquor per week     Comment: gin and half a lime; tonic water    Drug use: Not Currently     Types: Marijuana, Inhaled     Comment: medical marijuana through bong/vape    Sexual activity: Not on file     Comment:    Other Topics Concern    Not on file   Social History Narrative    Not on file     Social Drivers of Health     Financial Resource Strain: Unknown (1/15/2023)    Overall Financial Resource Strain (CARDIA)     Difficulty of Paying Living Expenses: Patient declined   Food Insecurity: Unknown (1/15/2023)    Hunger Vital Sign     Worried About Running Out of Food in the Last Year: Patient declined     Ran Out of Food in the Last Year: Patient declined   Transportation Needs: Unknown (1/15/2023)    PRAPARE - Transportation     Lack of Transportation (Medical): Patient declined     Lack of Transportation (Non-Medical): Patient declined   Physical Activity: Sufficiently Active (1/15/2023)    Exercise Vital Sign     Days of Exercise per Week: 7 days     Minutes of Exercise per Session: 60 min   Stress: No Stress Concern Present (11/4/2021)    [place] [employer] - Occupational Stress Questionnaire     Feeling of Stress : Not at all   Social Connections: Socially Integrated (1/15/2023)    Social Connection and Isolation Panel [NHANES]     Frequency of Communication with Friends and Family: Three times a week     Frequency of Social Gatherings with Friends and Family: Once a week     Attends [employer] Services: More than 4 times per year     Active Member of Clubs or Organizations: Yes     Attends Club or Organization Meetings: More than 4 times per year     Marital Status:    Intimate Partner Violence: Not on file   Housing Stability: Unknown (1/15/2023)    Housing Stability Vital Sign     Unable to Pay for Housing in the Last Year: Patient refused      "Number of Places Lived in the Last Year: 1     Unstable Housing in the Last Year: No     Allergies   Allergen Reactions    Demerol Hcl [Meperidine] Shortness of Breath and Palpitations    Methotrexate Hives, Shortness of Breath and Rash          Morphine Shortness of Breath and Palpitations    Promethazine Shortness of Breath and Rash          Remicade [Infliximab] Hives, Shortness of Breath and Rash          Sudafed [Pseudoephedrine] Shortness of Breath, Vomiting and Palpitations    Azathioprine Sodium Hives and Shortness of Breath     10/21/2022 - patient unable to verify allergy/reaction  Historical reaction listed: Hives, Shortness of Breath    Carafate [Sucralfate] Vomiting and Nausea     + Mouth Sores    Other Drug Unspecified     \"STEROIDS\" - REACTION NOT SPECIFIED    Sulfa Drugs Rash          Sulfasalazine Rash          Gabapentin Cough, Hives, Itching, Nausea, Palpitations, Photosensitivity, Rash, Runny Nose, Swelling, Unspecified and Vomiting    Nitroglycerin      Headache    Prednisone      Other Reaction(s): Reaction:GI system trouble    Amitriptyline Unspecified     Nightmares      Ativan [Lorazepam] Unspecified     Nightmares    Betamethasone Unspecified     10/21/2022 - patient unable to verify allergy/reaction  No historical reaction listed    Fentanyl Unspecified     \"Patches didn't work\" and skin broke down    Lyrica [Pregabalin] Nausea          Methadone Unspecified     \"Didn't work\"    Potassium Unspecified     Notes: In IV only  REACTION NOT SPECIFIED    Tizanidine Unspecified     10/21/2022 - patient unable to verify allergy/reaction  No historical reaction listed       Current Outpatient Medications   Medication Sig Dispense Refill    Azelaic Acid 15 % Gel APPLY TOPICALLY TO FACE TWICE DAILY      diazePAM (VALIUM) 5 MG Tab Take 1 Tablet by mouth every 6 hours as needed.      ipratropium (ATROVENT) 0.06 % Solution       Zinc Picolinate Powder Take 1 Tablet by mouth 2 times a day.      [START ON " 2/17/2025] oxyCODONE immediate release (ROXICODONE) 10 MG immediate release tablet Take 1 Tablet by mouth every 6 hours as needed for Moderate Pain (Refill #3 of #3) for up to 30 days. 90 Tablet 0    [START ON 1/18/2025] oxyCODONE immediate release (ROXICODONE) 10 MG immediate release tablet Take 1 Tablet by mouth every four hours as needed for Moderate Pain (Refill #2 of #3) for up to 30 days. 90 Tablet 0    [START ON 12/19/2024] oxyCODONE immediate release (ROXICODONE) 10 MG immediate release tablet Take 1 Tablet by mouth every 6 hours as needed for Moderate Pain (refill #1 of #3) for up to 30 days. 90 Tablet 0    amLODIPine (NORVASC) 2.5 MG Tab Take 1 Tablet by mouth every day. 90 Tablet 3    amoxicillin-clavulanate (AUGMENTIN) 875-125 MG Tab Take 1 Tablet by mouth 2 times a day. 14 Tablet 0    spironolactone (ALDACTONE) 25 MG Tab Take 1 Tablet by mouth every day. **LAST SEEN 12/2023 .  TO ENSURE FURTHER REFILLS, KEEP SCHEDULED FOLLOW UP VISIT 11/27/2024.** 90 Tablet 0    traZODone (DESYREL) 50 MG Tab TAKE 1 TABLET BY MOUTH EVERY EVENING 90 Tablet 3    cetirizine (ZYRTEC) 1 MG/ML Solution oral solution Take 10 mL by mouth every day. 150 mL 3    potassium chloride SA (KDUR) 20 MEQ Tab CR Take 1 Tablet by mouth 2 times a day. 180 Tablet 1    hydroxychloroquine (PLAQUENIL) 200 MG Tab Take 1 Tablet by mouth every day. 90 Tablet 3    furosemide (LASIX) 20 MG Tab Take 1 Tablet by mouth every day. 100 Tablet 0    ondansetron (ZOFRAN ODT) 4 MG TABLET DISPERSIBLE Take 1 Tablet by mouth every 6 hours as needed for Nausea/Vomiting. 20 Tablet 5    metoprolol tartrate (LOPRESSOR) 50 MG Tab Take 1 Tablet by mouth 2 times a day. 180 Tablet 3    lidocaine (ASPERFLEX) 4 % Patch Place 1 Patch on the skin every 24 hours. 30 Patch 2    Azelastine (ASTELIN) 137 MCG/SPRAY Solution Administer 1 Spray into each nostril at bedtime.      fluticasone (FLONASE) 50 MCG/ACT nasal spray Administer 1 Spray into each nostril at bedtime.       nystatin (MYCOSTATIN) 268838 UNIT/ML Suspension Take 5 mL by mouth 4 times a day. 60 mL 0    prochlorperazine (COMPAZINE) 10 MG Tab Take 1 Tablet by mouth every 6 hours as needed for Nausea/Vomiting. 30 Tablet 3    granisetron (KYTRIL) 1 MG Tab Take 1 Tablet by mouth every 12 hours as needed for Nausea/Vomiting. 10 Tablet 0    levalbuterol (XOPENEX HFA) 45 MCG/ACT inhaler Inhale 2 Puffs every four hours as needed for Shortness of Breath (As needed for shortness of breath, cough, wheezing.). 1 Each 11    Ascorbic Acid (VITAMIN C PO) Take 1 Tablet by mouth 2 times a day.      Biotin w/ Vitamins C & E (HAIR SKIN & NAILS GUMMIES PO) Take 1 Tablet by mouth 2 times a day.      naratriptan (AMERGE) 2.5 MG tablet Take 1 Tablet by mouth as needed for Migraine. 5 Tablet 3     No current facility-administered medications for this visit.       ROS  All others systems reviewed and negative.    /78 (BP Location: Left arm, Patient Position: Sitting, BP Cuff Size: Adult)   Pulse (!) 103   Resp 14   Ht 1.829 m (6')   Wt 59.1 kg (130 lb 6.4 oz)   SpO2 94%  Body mass index is 17.69 kg/m².    General: No acute distress. Thin  HEENT: EOM grossly intact, no scleral icterus, no pharyngeal erythema.   Neck:  No JVD at 90, no bruits, trachea midline  CVS: Fast, regular. Normal S1, S2. No M/R/G. No LE edema, compression socks.  2+ radial pulses, 2+ PT pulses  Resp: CTAB. No wheezing or crackles/rhonchi. Normal respiratory effort.  Abdomen: Soft, NT, no marii hepatomegaly.  MSK/Ext: No clubbing or cyanosis.  Skin: Warm and dry, no rashes.  Neurological: CN III-XII grossly intact. No focal deficits.   Psych: A&O x 3, appropriate affect, good judgement    Return in about 1 year (around 11/27/2025).    Written instructions given today:      -Amlodipine is a calcium channel blocker that people can take for Raynaud's to help improve blood flow.  Sometimes leg swelling can get worse but is cosmetic only.  Sometimes you get mild  constipation.  Some people can get a vascular rash or bleeding gums on this medicine.  However, if it helps you feel better in terms of the Raynaud's, definitely take it    -Metoprolol is a great medicine to try to chill out the heart rate.  You can take up to 100 mg twice daily.  This means it anytime you can increase your dose from 1 tablet twice daily to 2 tablets twice daily or just simply take an additional 1 tablet as needed.    -If you need to get back into cardiology clinic earlier with me, send a [employer] message and we will get you in.    It is my pleasure to participate in the care of Ms. Overton.  Please do not hesitate to contact me with questions or concerns.    Kori Oshea MD, [facility]  Cardiologist, [facility]    Please note that this dictation was created using voice recognition software. I have made every reasonable attempt to correct obvious errors, but it is possible there are errors of grammar and possibly content that I did not discover before finalizing the note.

## 2024-11-28 ENCOUNTER — PATIENT MESSAGE (OUTPATIENT)
Dept: MEDICAL GROUP | Facility: LAB | Age: 69
End: 2024-11-28
Payer: MEDICARE

## 2024-11-30 DIAGNOSIS — R11.14 BILIOUS VOMITING WITH NAUSEA: ICD-10-CM

## 2024-12-01 ENCOUNTER — PATIENT MESSAGE (OUTPATIENT)
Dept: MEDICAL GROUP | Facility: LAB | Age: 69
End: 2024-12-01
Payer: MEDICARE

## 2024-12-02 ENCOUNTER — HOSPITAL ENCOUNTER (OUTPATIENT)
Dept: LAB | Facility: MEDICAL CENTER | Age: 69
End: 2024-12-02
Attending: INTERNAL MEDICINE
Payer: MEDICARE

## 2024-12-02 ENCOUNTER — APPOINTMENT (OUTPATIENT)
Dept: MEDICAL GROUP | Facility: LAB | Age: 69
End: 2024-12-02
Payer: MEDICARE

## 2024-12-02 VITALS
TEMPERATURE: 96.8 F | HEIGHT: 72 IN | OXYGEN SATURATION: 95 % | RESPIRATION RATE: 14 BRPM | HEART RATE: 94 BPM | BODY MASS INDEX: 17.65 KG/M2 | WEIGHT: 130.29 LBS | SYSTOLIC BLOOD PRESSURE: 102 MMHG | DIASTOLIC BLOOD PRESSURE: 70 MMHG

## 2024-12-02 DIAGNOSIS — K50.019 CROHN'S DISEASE OF SMALL INTESTINE WITH COMPLICATION (HCC): ICD-10-CM

## 2024-12-02 DIAGNOSIS — B37.81 ESOPHAGEAL CANDIDIASIS (HCC): ICD-10-CM

## 2024-12-02 DIAGNOSIS — F51.01 PRIMARY INSOMNIA: ICD-10-CM

## 2024-12-02 LAB
BASOPHILS # BLD AUTO: 1 % (ref 0–1.8)
BASOPHILS # BLD: 0.1 K/UL (ref 0–0.12)
EOSINOPHIL # BLD AUTO: 0.05 K/UL (ref 0–0.51)
EOSINOPHIL NFR BLD: 0.5 % (ref 0–6.9)
ERYTHROCYTE [DISTWIDTH] IN BLOOD BY AUTOMATED COUNT: 44 FL (ref 35.9–50)
HCT VFR BLD AUTO: 42.3 % (ref 37–47)
HGB BLD-MCNC: 14.2 G/DL (ref 12–16)
IMM GRANULOCYTES # BLD AUTO: 0.02 K/UL (ref 0–0.11)
IMM GRANULOCYTES NFR BLD AUTO: 0.2 % (ref 0–0.9)
LYMPHOCYTES # BLD AUTO: 1.68 K/UL (ref 1–4.8)
LYMPHOCYTES NFR BLD: 17.2 % (ref 22–41)
MCH RBC QN AUTO: 31.4 PG (ref 27–33)
MCHC RBC AUTO-ENTMCNC: 33.6 G/DL (ref 32.2–35.5)
MCV RBC AUTO: 93.6 FL (ref 81.4–97.8)
MONOCYTES # BLD AUTO: 0.95 K/UL (ref 0–0.85)
MONOCYTES NFR BLD AUTO: 9.8 % (ref 0–13.4)
NEUTROPHILS # BLD AUTO: 6.94 K/UL (ref 1.82–7.42)
NEUTROPHILS NFR BLD: 71.3 % (ref 44–72)
NRBC # BLD AUTO: 0 K/UL
NRBC BLD-RTO: 0 /100 WBC (ref 0–0.2)
PLATELET # BLD AUTO: 274 K/UL (ref 164–446)
PMV BLD AUTO: 10.9 FL (ref 9–12.9)
RBC # BLD AUTO: 4.52 M/UL (ref 4.2–5.4)
WBC # BLD AUTO: 9.7 K/UL (ref 4.8–10.8)

## 2024-12-02 PROCEDURE — 36415 COLL VENOUS BLD VENIPUNCTURE: CPT

## 2024-12-02 PROCEDURE — 80053 COMPREHEN METABOLIC PANEL: CPT

## 2024-12-02 PROCEDURE — 85025 COMPLETE CBC W/AUTO DIFF WBC: CPT

## 2024-12-02 PROCEDURE — 99214 OFFICE O/P EST MOD 30 MIN: CPT | Performed by: INTERNAL MEDICINE

## 2024-12-02 PROCEDURE — 3078F DIAST BP <80 MM HG: CPT | Performed by: INTERNAL MEDICINE

## 2024-12-02 PROCEDURE — 3074F SYST BP LT 130 MM HG: CPT | Performed by: INTERNAL MEDICINE

## 2024-12-02 RX ORDER — PROCHLORPERAZINE MALEATE 10 MG
10 TABLET ORAL EVERY 6 HOURS PRN
Qty: 30 TABLET | Refills: 3 | Status: SHIPPED | OUTPATIENT
Start: 2024-12-02

## 2024-12-02 RX ORDER — NYSTATIN 100000 [USP'U]/ML
500000 SUSPENSION ORAL 4 TIMES DAILY
Qty: 60 ML | Refills: 0 | Status: SHIPPED | OUTPATIENT
Start: 2024-12-02 | End: 2024-12-17 | Stop reason: SDUPTHER

## 2024-12-02 RX ORDER — ZOLPIDEM TARTRATE 10 MG/1
10 TABLET ORAL NIGHTLY PRN
Qty: 30 TABLET | Refills: 2 | Status: SHIPPED | OUTPATIENT
Start: 2024-12-02 | End: 2025-03-02

## 2024-12-02 RX ORDER — NYSTATIN 100000 [USP'U]/ML
500000 SUSPENSION ORAL 4 TIMES DAILY
Qty: 60 ML | Refills: 0 | Status: SHIPPED | OUTPATIENT
Start: 2024-12-02 | End: 2024-12-02

## 2024-12-02 ASSESSMENT — FIBROSIS 4 INDEX: FIB4 SCORE: 2.76

## 2024-12-03 ENCOUNTER — PATIENT MESSAGE (OUTPATIENT)
Dept: MEDICAL GROUP | Facility: LAB | Age: 69
End: 2024-12-03
Payer: MEDICARE

## 2024-12-03 LAB
ALBUMIN SERPL BCP-MCNC: 4.8 G/DL (ref 3.2–4.9)
ALBUMIN/GLOB SERPL: 1.4 G/DL
ALP SERPL-CCNC: 97 U/L (ref 30–99)
ALT SERPL-CCNC: 27 U/L (ref 2–50)
ANION GAP SERPL CALC-SCNC: 15 MMOL/L (ref 7–16)
AST SERPL-CCNC: 42 U/L (ref 12–45)
BILIRUB SERPL-MCNC: 0.9 MG/DL (ref 0.1–1.5)
BUN SERPL-MCNC: 44 MG/DL (ref 8–22)
CALCIUM ALBUM COR SERPL-MCNC: 10.2 MG/DL (ref 8.5–10.5)
CALCIUM SERPL-MCNC: 10.8 MG/DL (ref 8.5–10.5)
CHLORIDE SERPL-SCNC: 101 MMOL/L (ref 96–112)
CO2 SERPL-SCNC: 21 MMOL/L (ref 20–33)
CREAT SERPL-MCNC: 2.51 MG/DL (ref 0.5–1.4)
GFR SERPLBLD CREATININE-BSD FMLA CKD-EPI: 20 ML/MIN/1.73 M 2
GLOBULIN SER CALC-MCNC: 3.5 G/DL (ref 1.9–3.5)
GLUCOSE SERPL-MCNC: 126 MG/DL (ref 65–99)
POTASSIUM SERPL-SCNC: 4.7 MMOL/L (ref 3.6–5.5)
PROT SERPL-MCNC: 8.3 G/DL (ref 6–8.2)
SODIUM SERPL-SCNC: 137 MMOL/L (ref 135–145)

## 2024-12-03 NOTE — PROGRESS NOTES
CC: Jana Overton is a 69 y.o. female is suffering from   Chief Complaint   Patient presents with    Medication Refill    Other     Crohn's disease          SUBJECTIVE:  1. Esophageal candidiasis (HCC)  Jana is here for follow-up is having problems with esophageal candidiasis, we have discussed that this is likely related to her nausea vomiting    2. Crohn's disease of small intestine with complication (HCC)  History of Crohn's disease of the small bowel    3. Primary insomnia  History of insomnia start Ambien    Past social, family, history: , G[place]  Social History     Tobacco Use    Smoking status: Never    Smokeless tobacco: Never    Tobacco comments:     second hand smoke parents - smoked for only 1 year many years ago   Vaping Use    Vaping status: Every Day    Substances: THC   Substance Use Topics    Alcohol use: Not Currently     Alcohol/week: 0.6 oz     Types: 1 Shots of liquor per week     Comment: gin and half a lime; tonic water    Drug use: Not Currently     Types: Marijuana, Inhaled     Comment: medical marijuana through bong/vape         MEDICATIONS:    Current Outpatient Medications:     nystatin (MYCOSTATIN) 472797 UNIT/ML Suspension, Take 5 mL by mouth 4 times a day., Disp: 60 mL, Rfl: 0    zolpidem (AMBIEN) 10 MG Tab, Take 1 Tablet by mouth at bedtime as needed for Sleep for up to 90 days., Disp: 30 Tablet, Rfl: 2    Azelaic Acid 15 % Gel, APPLY TOPICALLY TO FACE TWICE DAILY, Disp: , Rfl:     diazePAM (VALIUM) 5 MG Tab, Take 1 Tablet by mouth every 6 hours as needed., Disp: , Rfl:     [START ON 2/17/2025] oxyCODONE immediate release (ROXICODONE) 10 MG immediate release tablet, Take 1 Tablet by mouth every 6 hours as needed for Moderate Pain (Refill #3 of #3) for up to 30 days., Disp: 90 Tablet, Rfl: 0    [START ON 1/18/2025] oxyCODONE immediate release (ROXICODONE) 10 MG immediate release tablet, Take 1 Tablet by mouth every four hours as needed for Moderate Pain (Refill #2 of #3) for  up to 30 days., Disp: 90 Tablet, Rfl: 0    [START ON 12/19/2024] oxyCODONE immediate release (ROXICODONE) 10 MG immediate release tablet, Take 1 Tablet by mouth every 6 hours as needed for Moderate Pain (refill #1 of #3) for up to 30 days., Disp: 90 Tablet, Rfl: 0    amLODIPine (NORVASC) 2.5 MG Tab, Take 1 Tablet by mouth every day., Disp: 90 Tablet, Rfl: 3    spironolactone (ALDACTONE) 25 MG Tab, Take 1 Tablet by mouth every day. **LAST SEEN 12/2023 .  TO ENSURE FURTHER REFILLS, KEEP SCHEDULED FOLLOW UP VISIT 11/27/2024.**, Disp: 90 Tablet, Rfl: 0    cetirizine (ZYRTEC) 1 MG/ML Solution oral solution, Take 10 mL by mouth every day., Disp: 150 mL, Rfl: 3    potassium chloride SA (KDUR) 20 MEQ Tab CR, Take 1 Tablet by mouth 2 times a day., Disp: 180 Tablet, Rfl: 1    hydroxychloroquine (PLAQUENIL) 200 MG Tab, Take 1 Tablet by mouth every day., Disp: 90 Tablet, Rfl: 3    furosemide (LASIX) 20 MG Tab, Take 1 Tablet by mouth every day., Disp: 100 Tablet, Rfl: 0    ondansetron (ZOFRAN ODT) 4 MG TABLET DISPERSIBLE, Take 1 Tablet by mouth every 6 hours as needed for Nausea/Vomiting., Disp: 20 Tablet, Rfl: 5    metoprolol tartrate (LOPRESSOR) 50 MG Tab, Take 1 Tablet by mouth 2 times a day., Disp: 180 Tablet, Rfl: 3    Azelastine (ASTELIN) 137 MCG/SPRAY Solution, Administer 1 Spray into each nostril at bedtime., Disp: , Rfl:     fluticasone (FLONASE) 50 MCG/ACT nasal spray, Administer 1 Spray into each nostril at bedtime., Disp: , Rfl:     granisetron (KYTRIL) 1 MG Tab, Take 1 Tablet by mouth every 12 hours as needed for Nausea/Vomiting., Disp: 10 Tablet, Rfl: 0    levalbuterol (XOPENEX HFA) 45 MCG/ACT inhaler, Inhale 2 Puffs every four hours as needed for Shortness of Breath (As needed for shortness of breath, cough, wheezing.)., Disp: 1 Each, Rfl: 11    Ascorbic Acid (VITAMIN C PO), Take 1 Tablet by mouth 2 times a day., Disp: , Rfl:     Biotin w/ Vitamins C & E (HAIR SKIN & NAILS GUMMIES PO), Take 1 Tablet by mouth 2  times a day., Disp: , Rfl:     naratriptan (AMERGE) 2.5 MG tablet, Take 1 Tablet by mouth as needed for Migraine., Disp: 5 Tablet, Rfl: 3    prochlorperazine (COMPAZINE) 10 MG Tab, Take 1 Tablet by mouth every 6 hours as needed for Nausea/Vomiting., Disp: 30 Tablet, Rfl: 3    ipratropium (ATROVENT) 0.06 % Solution, , Disp: , Rfl:     Zinc Picolinate Powder, Take 1 Tablet by mouth 2 times a day., Disp: , Rfl:     amoxicillin-clavulanate (AUGMENTIN) 875-125 MG Tab, Take 1 Tablet by mouth 2 times a day. (Patient not taking: Reported on 12/2/2024), Disp: 14 Tablet, Rfl: 0    traZODone (DESYREL) 50 MG Tab, TAKE 1 TABLET BY MOUTH EVERY EVENING (Patient not taking: Reported on 12/2/2024), Disp: 90 Tablet, Rfl: 3    lidocaine (ASPERFLEX) 4 % Patch, Place 1 Patch on the skin every 24 hours., Disp: 30 Patch, Rfl: 2    PROBLEMS:  Patient Active Problem List    Diagnosis Date Noted    Underweight (BMI < 18.5) 11/27/2024    Thrombocytopenia (HCC) 12/11/2023    Chronic diastolic congestive heart failure (HCC) 12/11/2023    Chronic kidney disease, stage 3b 11/22/2023    Transient alteration of awareness 10/26/2023    Achalasia 10/18/2023    Esophageal candidiasis (HCC) 02/08/2023    Sacroiliac joint pain 11/10/2022    Esophageal stricture 10/22/2022    Dysphagia 10/21/2022    Drug-induced lupus erythematosus 09/27/2022    Long-term use of hydroxychloroquine 09/27/2022    Fibromyalgia syndrome 09/27/2022    Primary osteoarthritis of both hands 07/28/2022    Positive cardiolipin antibodies (IgA and IgG anti-CL) 07/27/2022    Positive VAHID (1:640 homogenous pattern with negative reflex) 07/27/2022    Inflammatory arthritis 07/27/2022    Mild tetrahydrocannabinol (THC) abuse 06/02/2022    Oropharyngeal dysphagia 06/02/2022    Acute pancreatitis 05/23/2022    Migraine 06/04/2019    Essential hypertension 05/20/2019    SIRS (systemic inflammatory response syndrome) (HCC) 05/19/2019    History of MRSA infection 12/29/2018    History of  infection with vancomycin resistant Enterococcus (VRE) 12/29/2018    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Former[facility]) 12/28/2018    Anemia 12/15/2018    Hyponatremia 12/12/2018    Leukocytosis 12/12/2018    Anxiety disorder due to multiple medical problems 12/01/2017    DDD (degenerative disc disease), lumbar 04/12/2017    History of DVT (deep vein thrombosis) 11/23/2016    Atrial fibrillation ([facility] [place]) 03/26/2015    Sleep apnea 10/26/2014    Postherpetic neuralgia 06/24/2014    Multiple falls 06/24/2014    COPD (chronic obstructive pulmonary disease) (Former[facility]) 06/24/2014    Rosacea 06/24/2014    Ileostomy in place (Former[facility]) 06/26/2013    GERD (gastroesophageal reflux disease) 12/05/2012    Status post lumbar surgery 07/16/2012    Crohn's disease of small intestine with complication ([facility]) 09/23/2009    Central sensitization to pain 09/23/2009    Opioid type dependence, continuous (CMS-HCC) 09/23/2009    Degenerative joint disease of cervical and lumbar spine 09/23/2009       REVIEW OF SYSTEMS:  Gen.:  No Nausea, Vomiting, fever, Chills.  Heart: No chest pain.  Lungs:  No shortness of Breath.  Psychological: Rg unusual Anxiety depression     PHYSICAL EXAM      Constitutional: Alert, cooperative, not in acute distress.  Cardiovascular:  Rate Rhythm is regular without murmurs rubs clicks.     Thorax & Lungs: Clear to auscultation, no wheezing, rhonchi, or rales  HENT: Normocephalic, Atraumatic.  Eyes: PERRLA, EOMI, Conjunctiva normal.   Neck: Trachia is midline no swelling of the thyroid.   Lymphatic: No lymphadenopathy noted.   Abdomin: Soft non-tender, no rebound, no guarding.   Neurologic: Alert & oriented x 3, cranial nerves II through XII are intact, Normal motor function, Normal sensory function, No focal deficits noted.   Psychiatric: Affect normal, Judgment normal, Mood normal.     VITAL SIGNS:/70   Pulse 94   Temp 36 °C (96.8 °F) (Temporal)   Resp 14   Ht 1.829 m (6')   Wt 59.1 kg (130 lb 4.7 oz)   SpO2 95%   BMI 17.67 kg/m²      Labs: Reviewed    Assessment:                                                     Plan:     1. Esophageal candidiasis (HCC)  Esophageal candidiasis nystatin suspension written for  - nystatin (MYCOSTATIN) 360168 UNIT/ML Suspension; Take 5 mL by mouth 4 times a day.  Dispense: 60 mL; Refill: 0    2. Crohn's disease of small intestine with complication (HCC)  Crohn's disease recheck comprehensive metabolic panel CBC  - Comp Metabolic Panel; Future  - CBC WITH DIFFERENTIAL; Future    3. Primary insomnia  Zolpidem 10 mg to be used at night  - zolpidem (AMBIEN) 10 MG Tab; Take 1 Tablet by mouth at bedtime as needed for Sleep for up to 90 days.  Dispense: 30 Tablet; Refill: 2

## 2024-12-04 ENCOUNTER — HOSPITAL ENCOUNTER (INPATIENT)
Facility: MEDICAL CENTER | Age: 69
LOS: 2 days | DRG: 641 | End: 2024-12-09
Attending: EMERGENCY MEDICINE | Admitting: HOSPITALIST
Payer: MEDICARE

## 2024-12-04 ENCOUNTER — APPOINTMENT (OUTPATIENT)
Dept: RADIOLOGY | Facility: MEDICAL CENTER | Age: 69
DRG: 641 | End: 2024-12-04
Attending: EMERGENCY MEDICINE
Payer: MEDICARE

## 2024-12-04 DIAGNOSIS — R11.2 NAUSEA AND VOMITING, UNSPECIFIED VOMITING TYPE: ICD-10-CM

## 2024-12-04 DIAGNOSIS — N17.9 ACUTE KIDNEY INJURY (HCC): ICD-10-CM

## 2024-12-04 DIAGNOSIS — R05.9 COUGH, UNSPECIFIED TYPE: ICD-10-CM

## 2024-12-04 DIAGNOSIS — E86.0 DEHYDRATION: ICD-10-CM

## 2024-12-04 DIAGNOSIS — N18.32 CHRONIC KIDNEY DISEASE, STAGE 3B: ICD-10-CM

## 2024-12-04 DIAGNOSIS — R39.89 BLADDER PAIN: ICD-10-CM

## 2024-12-04 PROBLEM — R19.7 DIARRHEA: Status: ACTIVE | Noted: 2024-12-04

## 2024-12-04 PROBLEM — R23.3 EASY BRUISING: Status: ACTIVE | Noted: 2024-12-04

## 2024-12-04 PROBLEM — E87.20 METABOLIC ACIDOSIS: Status: ACTIVE | Noted: 2024-12-04

## 2024-12-04 PROBLEM — Z71.89 ACP (ADVANCE CARE PLANNING): Status: ACTIVE | Noted: 2024-12-04

## 2024-12-04 LAB
ALBUMIN SERPL BCP-MCNC: 4.7 G/DL (ref 3.2–4.9)
ALBUMIN/GLOB SERPL: 1.3 G/DL
ALP SERPL-CCNC: 110 U/L (ref 30–99)
ALT SERPL-CCNC: 35 U/L (ref 2–50)
ANION GAP SERPL CALC-SCNC: 16 MMOL/L (ref 7–16)
APPEARANCE UR: CLEAR
AST SERPL-CCNC: 43 U/L (ref 12–45)
BASOPHILS # BLD AUTO: 0.6 % (ref 0–1.8)
BASOPHILS # BLD: 0.06 K/UL (ref 0–0.12)
BILIRUB SERPL-MCNC: 0.6 MG/DL (ref 0.1–1.5)
BILIRUB UR QL STRIP.AUTO: NEGATIVE
BUN SERPL-MCNC: 57 MG/DL (ref 8–22)
CALCIUM ALBUM COR SERPL-MCNC: 9.6 MG/DL (ref 8.5–10.5)
CALCIUM SERPL-MCNC: 10.2 MG/DL (ref 8.4–10.2)
CHLORIDE SERPL-SCNC: 106 MMOL/L (ref 96–112)
CO2 SERPL-SCNC: 17 MMOL/L (ref 20–33)
COLOR UR: YELLOW
CREAT SERPL-MCNC: 2.5 MG/DL (ref 0.5–1.4)
EKG IMPRESSION: NORMAL
EOSINOPHIL # BLD AUTO: 0.02 K/UL (ref 0–0.51)
EOSINOPHIL NFR BLD: 0.2 % (ref 0–6.9)
ERYTHROCYTE [DISTWIDTH] IN BLOOD BY AUTOMATED COUNT: 41.8 FL (ref 35.9–50)
GFR SERPLBLD CREATININE-BSD FMLA CKD-EPI: 20 ML/MIN/1.73 M 2
GLOBULIN SER CALC-MCNC: 3.5 G/DL (ref 1.9–3.5)
GLUCOSE SERPL-MCNC: 98 MG/DL (ref 65–99)
GLUCOSE UR STRIP.AUTO-MCNC: NEGATIVE MG/DL
HCT VFR BLD AUTO: 41.4 % (ref 37–47)
HGB BLD-MCNC: 14 G/DL (ref 12–16)
IMM GRANULOCYTES # BLD AUTO: 0.04 K/UL (ref 0–0.11)
IMM GRANULOCYTES NFR BLD AUTO: 0.4 % (ref 0–0.9)
KETONES UR STRIP.AUTO-MCNC: NEGATIVE MG/DL
LACTATE SERPL-SCNC: 1.5 MMOL/L (ref 0.5–2)
LEUKOCYTE ESTERASE UR QL STRIP.AUTO: NEGATIVE
LIPASE SERPL-CCNC: 63 U/L (ref 11–82)
LYMPHOCYTES # BLD AUTO: 1.23 K/UL (ref 1–4.8)
LYMPHOCYTES NFR BLD: 12.5 % (ref 22–41)
MAGNESIUM SERPL-MCNC: 1.8 MG/DL (ref 1.5–2.5)
MCH RBC QN AUTO: 31 PG (ref 27–33)
MCHC RBC AUTO-ENTMCNC: 33.8 G/DL (ref 32.2–35.5)
MCV RBC AUTO: 91.8 FL (ref 81.4–97.8)
MICRO URNS: NORMAL
MONOCYTES # BLD AUTO: 0.7 K/UL (ref 0–0.85)
MONOCYTES NFR BLD AUTO: 7.1 % (ref 0–13.4)
NEUTROPHILS # BLD AUTO: 7.77 K/UL (ref 1.82–7.42)
NEUTROPHILS NFR BLD: 79.2 % (ref 44–72)
NITRITE UR QL STRIP.AUTO: NEGATIVE
NRBC # BLD AUTO: 0 K/UL
NRBC BLD-RTO: 0 /100 WBC (ref 0–0.2)
PH UR STRIP.AUTO: 6 [PH] (ref 5–8)
PLATELET # BLD AUTO: 238 K/UL (ref 164–446)
PMV BLD AUTO: 10.4 FL (ref 9–12.9)
POTASSIUM SERPL-SCNC: 4.6 MMOL/L (ref 3.6–5.5)
PROT SERPL-MCNC: 8.2 G/DL (ref 6–8.2)
PROT UR QL STRIP: NEGATIVE MG/DL
RBC # BLD AUTO: 4.51 M/UL (ref 4.2–5.4)
RBC UR QL AUTO: NEGATIVE
SODIUM SERPL-SCNC: 139 MMOL/L (ref 135–145)
SP GR UR STRIP.AUTO: >=1.03
TROPONIN T SERPL-MCNC: 19 NG/L (ref 6–19)
WBC # BLD AUTO: 9.8 K/UL (ref 4.8–10.8)

## 2024-12-04 PROCEDURE — 83605 ASSAY OF LACTIC ACID: CPT

## 2024-12-04 PROCEDURE — 700102 HCHG RX REV CODE 250 W/ 637 OVERRIDE(OP): Performed by: HOSPITALIST

## 2024-12-04 PROCEDURE — 700111 HCHG RX REV CODE 636 W/ 250 OVERRIDE (IP): Mod: JZ | Performed by: HOSPITALIST

## 2024-12-04 PROCEDURE — 700105 HCHG RX REV CODE 258: Performed by: EMERGENCY MEDICINE

## 2024-12-04 PROCEDURE — 83735 ASSAY OF MAGNESIUM: CPT

## 2024-12-04 PROCEDURE — 83690 ASSAY OF LIPASE: CPT

## 2024-12-04 PROCEDURE — 96376 TX/PRO/DX INJ SAME DRUG ADON: CPT

## 2024-12-04 PROCEDURE — 99223 1ST HOSP IP/OBS HIGH 75: CPT | Mod: AI,25 | Performed by: HOSPITALIST

## 2024-12-04 PROCEDURE — 96375 TX/PRO/DX INJ NEW DRUG ADDON: CPT

## 2024-12-04 PROCEDURE — 74176 CT ABD & PELVIS W/O CONTRAST: CPT

## 2024-12-04 PROCEDURE — 81003 URINALYSIS AUTO W/O SCOPE: CPT

## 2024-12-04 PROCEDURE — 80053 COMPREHEN METABOLIC PANEL: CPT

## 2024-12-04 PROCEDURE — 700105 HCHG RX REV CODE 258: Performed by: HOSPITALIST

## 2024-12-04 PROCEDURE — 87086 URINE CULTURE/COLONY COUNT: CPT

## 2024-12-04 PROCEDURE — 94760 N-INVAS EAR/PLS OXIMETRY 1: CPT

## 2024-12-04 PROCEDURE — G0378 HOSPITAL OBSERVATION PER HR: HCPCS

## 2024-12-04 PROCEDURE — 99497 ADVNCD CARE PLAN 30 MIN: CPT | Performed by: HOSPITALIST

## 2024-12-04 PROCEDURE — 96374 THER/PROPH/DIAG INJ IV PUSH: CPT

## 2024-12-04 PROCEDURE — 84484 ASSAY OF TROPONIN QUANT: CPT

## 2024-12-04 PROCEDURE — 99285 EMERGENCY DEPT VISIT HI MDM: CPT

## 2024-12-04 PROCEDURE — 700111 HCHG RX REV CODE 636 W/ 250 OVERRIDE (IP): Mod: JZ | Performed by: EMERGENCY MEDICINE

## 2024-12-04 PROCEDURE — 71045 X-RAY EXAM CHEST 1 VIEW: CPT

## 2024-12-04 PROCEDURE — 36415 COLL VENOUS BLD VENIPUNCTURE: CPT

## 2024-12-04 PROCEDURE — 93005 ELECTROCARDIOGRAM TRACING: CPT | Mod: TC | Performed by: EMERGENCY MEDICINE

## 2024-12-04 PROCEDURE — A9270 NON-COVERED ITEM OR SERVICE: HCPCS | Performed by: HOSPITALIST

## 2024-12-04 PROCEDURE — 85025 COMPLETE CBC W/AUTO DIFF WBC: CPT

## 2024-12-04 RX ORDER — HEPARIN SODIUM 5000 [USP'U]/ML
5000 INJECTION, SOLUTION INTRAVENOUS; SUBCUTANEOUS EVERY 8 HOURS
Status: DISCONTINUED | OUTPATIENT
Start: 2024-12-05 | End: 2024-12-08

## 2024-12-04 RX ORDER — AMOXICILLIN 250 MG
2 CAPSULE ORAL EVERY EVENING
Status: DISCONTINUED | OUTPATIENT
Start: 2024-12-04 | End: 2024-12-09 | Stop reason: HOSPADM

## 2024-12-04 RX ORDER — NYSTATIN 100000 [USP'U]/ML
5 SUSPENSION ORAL 4 TIMES DAILY
Status: DISCONTINUED | OUTPATIENT
Start: 2024-12-04 | End: 2024-12-09 | Stop reason: HOSPADM

## 2024-12-04 RX ORDER — AMLODIPINE BESYLATE 5 MG/1
2.5 TABLET ORAL DAILY
Status: DISCONTINUED | OUTPATIENT
Start: 2024-12-05 | End: 2024-12-09 | Stop reason: HOSPADM

## 2024-12-04 RX ORDER — DIAZEPAM 5 MG/1
5 TABLET ORAL EVERY 6 HOURS PRN
Status: DISCONTINUED | OUTPATIENT
Start: 2024-12-04 | End: 2024-12-09 | Stop reason: HOSPADM

## 2024-12-04 RX ORDER — ONDANSETRON 2 MG/ML
4 INJECTION INTRAMUSCULAR; INTRAVENOUS ONCE
Status: COMPLETED | OUTPATIENT
Start: 2024-12-04 | End: 2024-12-04

## 2024-12-04 RX ORDER — PROCHLORPERAZINE EDISYLATE 5 MG/ML
10 INJECTION INTRAMUSCULAR; INTRAVENOUS EVERY 6 HOURS PRN
Status: DISCONTINUED | OUTPATIENT
Start: 2024-12-04 | End: 2024-12-09 | Stop reason: HOSPADM

## 2024-12-04 RX ORDER — ONDANSETRON 2 MG/ML
4 INJECTION INTRAMUSCULAR; INTRAVENOUS EVERY 4 HOURS PRN
Status: DISCONTINUED | OUTPATIENT
Start: 2024-12-04 | End: 2024-12-09 | Stop reason: HOSPADM

## 2024-12-04 RX ORDER — SODIUM CHLORIDE, SODIUM LACTATE, POTASSIUM CHLORIDE, CALCIUM CHLORIDE 600; 310; 30; 20 MG/100ML; MG/100ML; MG/100ML; MG/100ML
INJECTION, SOLUTION INTRAVENOUS CONTINUOUS
Status: ACTIVE | OUTPATIENT
Start: 2024-12-04 | End: 2024-12-05

## 2024-12-04 RX ORDER — SODIUM BICARBONATE 650 MG/1
1300 TABLET ORAL 2 TIMES DAILY
Status: COMPLETED | OUTPATIENT
Start: 2024-12-04 | End: 2024-12-06

## 2024-12-04 RX ORDER — HYDROMORPHONE HYDROCHLORIDE 1 MG/ML
0.5 INJECTION, SOLUTION INTRAMUSCULAR; INTRAVENOUS; SUBCUTANEOUS
Status: DISCONTINUED | OUTPATIENT
Start: 2024-12-04 | End: 2024-12-09 | Stop reason: HOSPADM

## 2024-12-04 RX ORDER — OXYCODONE HYDROCHLORIDE 5 MG/1
5 TABLET ORAL
Status: DISCONTINUED | OUTPATIENT
Start: 2024-12-04 | End: 2024-12-09 | Stop reason: HOSPADM

## 2024-12-04 RX ORDER — POLYETHYLENE GLYCOL 3350 17 G/17G
1 POWDER, FOR SOLUTION ORAL
Status: DISCONTINUED | OUTPATIENT
Start: 2024-12-04 | End: 2024-12-09 | Stop reason: HOSPADM

## 2024-12-04 RX ORDER — OXYCODONE HYDROCHLORIDE 10 MG/1
10 TABLET ORAL
Status: DISCONTINUED | OUTPATIENT
Start: 2024-12-04 | End: 2024-12-09 | Stop reason: HOSPADM

## 2024-12-04 RX ORDER — DIPHENHYDRAMINE HYDROCHLORIDE 50 MG/ML
25 INJECTION INTRAMUSCULAR; INTRAVENOUS ONCE
Status: COMPLETED | OUTPATIENT
Start: 2024-12-04 | End: 2024-12-04

## 2024-12-04 RX ORDER — PROCHLORPERAZINE MALEATE 10 MG
10 TABLET ORAL EVERY 6 HOURS PRN
Status: DISCONTINUED | OUTPATIENT
Start: 2024-12-04 | End: 2024-12-09 | Stop reason: HOSPADM

## 2024-12-04 RX ORDER — CALCIUM CARBONATE 500 MG/1
500 TABLET, CHEWABLE ORAL 3 TIMES DAILY PRN
Status: DISCONTINUED | OUTPATIENT
Start: 2024-12-04 | End: 2024-12-09 | Stop reason: HOSPADM

## 2024-12-04 RX ORDER — CETIRIZINE HYDROCHLORIDE 10 MG/1
10 TABLET ORAL DAILY
COMMUNITY

## 2024-12-04 RX ORDER — SODIUM CHLORIDE, SODIUM LACTATE, POTASSIUM CHLORIDE, CALCIUM CHLORIDE 600; 310; 30; 20 MG/100ML; MG/100ML; MG/100ML; MG/100ML
1000 INJECTION, SOLUTION INTRAVENOUS ONCE
Status: COMPLETED | OUTPATIENT
Start: 2024-12-04 | End: 2024-12-04

## 2024-12-04 RX ORDER — OXYCODONE HYDROCHLORIDE 10 MG/1
10 TABLET ORAL EVERY 6 HOURS PRN
Status: DISCONTINUED | OUTPATIENT
Start: 2024-12-04 | End: 2024-12-04

## 2024-12-04 RX ORDER — ACETAMINOPHEN 325 MG/1
650 TABLET ORAL EVERY 6 HOURS PRN
Status: DISCONTINUED | OUTPATIENT
Start: 2024-12-04 | End: 2024-12-09 | Stop reason: HOSPADM

## 2024-12-04 RX ORDER — CETIRIZINE HYDROCHLORIDE 10 MG/1
10 TABLET ORAL DAILY
Status: DISCONTINUED | OUTPATIENT
Start: 2024-12-04 | End: 2024-12-09 | Stop reason: HOSPADM

## 2024-12-04 RX ORDER — ONDANSETRON 4 MG/1
4 TABLET, ORALLY DISINTEGRATING ORAL EVERY 4 HOURS PRN
Status: DISCONTINUED | OUTPATIENT
Start: 2024-12-04 | End: 2024-12-09 | Stop reason: HOSPADM

## 2024-12-04 RX ORDER — HYDROXYCHLOROQUINE SULFATE 200 MG/1
200 TABLET, FILM COATED ORAL
Status: DISCONTINUED | OUTPATIENT
Start: 2024-12-04 | End: 2024-12-09 | Stop reason: HOSPADM

## 2024-12-04 RX ORDER — ZOLPIDEM TARTRATE 5 MG/1
10 TABLET ORAL NIGHTLY PRN
Status: DISCONTINUED | OUTPATIENT
Start: 2024-12-04 | End: 2024-12-09 | Stop reason: HOSPADM

## 2024-12-04 RX ORDER — METOPROLOL TARTRATE 50 MG
50 TABLET ORAL 2 TIMES DAILY
Status: DISCONTINUED | OUTPATIENT
Start: 2024-12-04 | End: 2024-12-09 | Stop reason: HOSPADM

## 2024-12-04 RX ADMIN — DIPHENHYDRAMINE HYDROCHLORIDE 25 MG: 50 INJECTION, SOLUTION INTRAMUSCULAR; INTRAVENOUS at 15:47

## 2024-12-04 RX ADMIN — PROCHLORPERAZINE EDISYLATE 10 MG: 5 INJECTION INTRAMUSCULAR; INTRAVENOUS at 23:01

## 2024-12-04 RX ADMIN — ONDANSETRON 4 MG: 2 INJECTION INTRAMUSCULAR; INTRAVENOUS at 11:20

## 2024-12-04 RX ADMIN — NYSTATIN 500000 UNITS: 100000 SUSPENSION ORAL at 21:19

## 2024-12-04 RX ADMIN — METOPROLOL TARTRATE 50 MG: 50 TABLET, FILM COATED ORAL at 17:58

## 2024-12-04 RX ADMIN — SODIUM BICARBONATE 1300 MG: 650 TABLET ORAL at 17:59

## 2024-12-04 RX ADMIN — OXYCODONE HYDROCHLORIDE 10 MG: 10 TABLET ORAL at 12:15

## 2024-12-04 RX ADMIN — OXYCODONE HYDROCHLORIDE 10 MG: 10 TABLET ORAL at 20:50

## 2024-12-04 RX ADMIN — ONDANSETRON 4 MG: 2 INJECTION INTRAMUSCULAR; INTRAVENOUS at 20:50

## 2024-12-04 RX ADMIN — ONDANSETRON 4 MG: 2 INJECTION INTRAMUSCULAR; INTRAVENOUS at 12:57

## 2024-12-04 RX ADMIN — CETIRIZINE HYDROCHLORIDE 10 MG: 10 TABLET, FILM COATED ORAL at 12:15

## 2024-12-04 RX ADMIN — SODIUM CHLORIDE, POTASSIUM CHLORIDE, SODIUM LACTATE AND CALCIUM CHLORIDE: 600; 310; 30; 20 INJECTION, SOLUTION INTRAVENOUS at 15:49

## 2024-12-04 RX ADMIN — SODIUM CHLORIDE, POTASSIUM CHLORIDE, SODIUM LACTATE AND CALCIUM CHLORIDE 1000 ML: 600; 310; 30; 20 INJECTION, SOLUTION INTRAVENOUS at 11:19

## 2024-12-04 RX ADMIN — HYDROXYCHLOROQUINE SULFATE 200 MG: 200 TABLET, FILM COATED ORAL at 12:17

## 2024-12-04 RX ADMIN — DIAZEPAM 5 MG: 5 TABLET ORAL at 15:47

## 2024-12-04 ASSESSMENT — LIFESTYLE VARIABLES
EVER FELT BAD OR GUILTY ABOUT YOUR DRINKING: NO
HAVE PEOPLE ANNOYED YOU BY CRITICIZING YOUR DRINKING: NO
HOW MANY TIMES IN THE PAST YEAR HAVE YOU HAD 5 OR MORE DRINKS IN A DAY: 0
EVER HAD A DRINK FIRST THING IN THE MORNING TO STEADY YOUR NERVES TO GET RID OF A HANGOVER: NO
DOES PATIENT WANT TO STOP DRINKING: NO
ALCOHOL_USE: NO
ON A TYPICAL DAY WHEN YOU DRINK ALCOHOL HOW MANY DRINKS DO YOU HAVE: 1
TOTAL SCORE: 0
TOTAL SCORE: 0
HAVE YOU EVER FELT YOU SHOULD CUT DOWN ON YOUR DRINKING: NO
AVERAGE NUMBER OF DAYS PER WEEK YOU HAVE A DRINK CONTAINING ALCOHOL: 1
CONSUMPTION TOTAL: NEGATIVE
TOTAL SCORE: 0

## 2024-12-04 ASSESSMENT — ENCOUNTER SYMPTOMS
MYALGIAS: 0
EYE DISCHARGE: 0
SHORTNESS OF BREATH: 0
STRIDOR: 0
DIARRHEA: 1
VOMITING: 1
CHILLS: 0
FEVER: 0
FOCAL WEAKNESS: 0
EYE REDNESS: 0
FLANK PAIN: 0
COUGH: 0
NERVOUS/ANXIOUS: 0
NAUSEA: 1
BRUISES/BLEEDS EASILY: 1

## 2024-12-04 ASSESSMENT — COGNITIVE AND FUNCTIONAL STATUS - GENERAL
SUGGESTED CMS G CODE MODIFIER MOBILITY: CH
MOBILITY SCORE: 24
SUGGESTED CMS G CODE MODIFIER DAILY ACTIVITY: CH
DAILY ACTIVITIY SCORE: 24

## 2024-12-04 ASSESSMENT — SOCIAL DETERMINANTS OF HEALTH (SDOH)
WITHIN THE PAST 12 MONTHS, THE FOOD YOU BOUGHT JUST DIDN'T LAST AND YOU DIDN'T HAVE MONEY TO GET MORE: NEVER TRUE
WITHIN THE LAST YEAR, HAVE YOU BEEN HUMILIATED OR EMOTIONALLY ABUSED IN OTHER WAYS BY YOUR PARTNER OR EX-PARTNER?: NO
WITHIN THE LAST YEAR, HAVE YOU BEEN AFRAID OF YOUR PARTNER OR EX-PARTNER?: NO
WITHIN THE LAST YEAR, HAVE TO BEEN RAPED OR FORCED TO HAVE ANY KIND OF SEXUAL ACTIVITY BY YOUR PARTNER OR EX-PARTNER?: NO
WITHIN THE LAST YEAR, HAVE YOU BEEN KICKED, HIT, SLAPPED, OR OTHERWISE PHYSICALLY HURT BY YOUR PARTNER OR EX-PARTNER?: NO
WITHIN THE PAST 12 MONTHS, YOU WORRIED THAT YOUR FOOD WOULD RUN OUT BEFORE YOU GOT THE MONEY TO BUY MORE: NEVER TRUE
IN THE PAST 12 MONTHS, HAS THE ELECTRIC, GAS, OIL, OR WATER COMPANY THREATENED TO SHUT OFF SERVICE IN YOUR HOME?: NO

## 2024-12-04 ASSESSMENT — PAIN DESCRIPTION - PAIN TYPE
TYPE: ACUTE PAIN

## 2024-12-04 ASSESSMENT — FIBROSIS 4 INDEX
FIB4 SCORE: 2.09
FIB4 SCORE: 2.17

## 2024-12-04 NOTE — H&P
Hospital Medicine History & Physical Note    Date of Service  12/4/2024    Primary Care Physician  Chase Charles D.O.    Consultants  None       Code Status  Full Code    Chief Complaint  Chief Complaint   Patient presents with    N/V     History of Presenting Illness  Jana Overton is a 69 y.o. 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  Patient denies noticing fevers or chills.  She denies noticing any blood in her vomitus or her ostomy output.  Patient reports that her vomiting is so severe that is not able to tolerate anything orally including medications.     I discussed the plan of care with emergency physician, the patient    Review of Systems  Review of Systems   Constitutional:  Positive for malaise/fatigue. Negative for chills and fever.   Eyes:  Negative for discharge and redness.   Respiratory:  Negative for cough, shortness of breath and stridor.    Cardiovascular:  Negative for chest pain and leg swelling.   Gastrointestinal:  Positive for diarrhea, nausea and vomiting.        Increase watery output from the ostomy site   Genitourinary:  Negative for flank pain.   Musculoskeletal:  Negative for myalgias.   Skin: Negative.    Neurological:  Negative for focal weakness.   Endo/Heme/Allergies:  Bruises/bleeds easily.   Psychiatric/Behavioral:  The patient is not nervous/anxious.      Past Medical History   has a past medical history of Anesthesia, Anxiety, Arthritis, ASTHMA, Atrial fib/flut, Backpain, Bronchitis, Chronic pain, Colostomy in place ([facility]), COPD (chronic obstructive pulmonary disease) ([facility]), Crohn's disease of colon ([facility]), Dyspnea, History of cardiac murmur, Ileostomy present ([facility]), Infectious disease, Multiple falls, Narcotic dependence ([facility]), Obstruction, Pain, Pneumonia, Postherpetic neuralgia, Rosacea, and Sleep  apnea.    Surgical History   has a past surgical history that includes lumbar fusion anterior; cervical disk and fusion anterior; foot surgery; hand surgery; other abdominal surgery; gastroscopy with biopsy (11/22/08); ileostomy (11/11/2009); dilation and curettage (9/24/2010); gastroscopy (10/4/2011); colonoscopy (10/4/2011); hardware removal neuro (7/16/2012); mammoplasty reduction (7/17/2013); ileostomy (5/14/2014); pr breast reduction; abdominal exploration; lumpectomy; colon resection; ileostomy (12/29/2018); sigmoidoscopy flex (12/29/2018); exploratory laparotomy (12/29/2018); gastroscopy (1/6/2019); gastroscopy (N/A, 5/22/2019); ileostomy (1/20/2021); lysis adhesions general (1/20/2021); pr cysto/uretero/pyeloscopy, dx (Right, 12/8/2021); cystoscopy with ureteral stent insertion or removal (Right, 12/8/2021); pr cystoscopy,insert ureteral stent (Right, 12/23/2021); pr cysto/uretero/pyeloscopy, dx (Right, 12/23/2021); pr upper gi endoscopy,diagnosis (N/A, 10/24/2022); biliary dilatation (N/A, 10/24/2022); pr upper gi endoscopy,diagnosis (N/A, 1/16/2023); pr upper gi endoscopy,w/dilat,gastric out (N/A, 1/16/2023); pr upper gi endoscopy,biopsy (N/A, 1/16/2023); pr upper gi endoscopy,diagnosis (N/A, 2/8/2023); pr upper gi endoscopy,w/dilat,gastric out (N/A, 2/8/2023); and pr upper gi endoscopy,biopsy (N/A, 2/8/2023).     Family History  family history includes Cancer (age of onset: 40) in her maternal aunt; Diabetes in her father and sister; Heart Disease in her father and mother; Lung Disease in her mother.      Social History   reports that she has never smoked. She has never used smokeless tobacco. She reports that she does not currently use alcohol after a past usage of about 0.6 oz of alcohol per week. She reports that she does not currently use drugs after having used the following drugs: Marijuana and Inhaled.    Allergies  Allergies   Allergen Reactions    Demerol Hcl [Meperidine] Shortness of Breath and  "Palpitations    Methotrexate Hives, Shortness of Breath and Rash          Morphine Shortness of Breath and Palpitations    Promethazine Shortness of Breath and Rash          Remicade [Infliximab] Hives, Shortness of Breath and Rash          Sudafed [Pseudoephedrine] Shortness of Breath, Vomiting and Palpitations    Azathioprine Sodium Hives and Shortness of Breath     10/21/2022 - patient unable to verify allergy/reaction  Historical reaction listed: Hives, Shortness of Breath    Carafate [Sucralfate] Vomiting and Nausea     + Mouth Sores    Other Drug Unspecified     \"STEROIDS\" - REACTION NOT SPECIFIED    Sulfa Drugs Rash          Sulfasalazine Rash          Gabapentin Cough, Hives, Itching, Nausea, Palpitations, Photosensitivity, Rash, Runny Nose, Swelling, Unspecified and Vomiting    Nitroglycerin      Headache    Prednisone      Other Reaction(s): Reaction:GI system trouble    Amitriptyline Unspecified     Nightmares      Ativan [Lorazepam] Unspecified     Nightmares    Betamethasone Unspecified     10/21/2022 - patient unable to verify allergy/reaction  No historical reaction listed    Fentanyl Unspecified     \"Patches didn't work\" and skin broke down    Lyrica [Pregabalin] Nausea          Methadone Unspecified     \"Didn't work\"    Potassium Unspecified     Notes: In IV only  REACTION NOT SPECIFIED    Tizanidine Unspecified     10/21/2022 - patient unable to verify allergy/reaction  No historical reaction listed     Medications  Prior to Admission Medications   Prescriptions Last Dose Informant Patient Reported? Taking?   Ascorbic Acid (VITAMIN C GUMMIE PO) 12/3/2024 at  5:00 AM Patient Yes Yes   Sig: Take 2 Tablets by mouth every day.   Azelaic Acid 15 % Gel 12/3/2024 at  5:00 AM Patient Yes Yes   Sig: Apply 1 Application topically every day. Apply's on face   Biotin-Vitamin C (HAIR SKIN NAILS GUMMIES PO) 12/3/2024 at  5:00 AM Patient Yes Yes   Sig: Take 2 Tablets by mouth every day.   Ca " Phosphate-Cholecalciferol (CALCIUM/VITAMIN D3 GUMMIES PO) 12/3/2024 at  5:00 AM Patient Yes Yes   Sig: Take 2 Tablets by mouth every day.   Multiple Vitamins-Minerals (MULTI-VITAMIN GUMMIES PO) 12/3/2024 at  5:00 AM Patient Yes Yes   Sig: Take 2 Tablets by mouth every day.   POTASSIUM CITRATE PO 12/3/2024 Morning Patient Yes Yes   Sig: Take 2 Tablets by mouth every day. Gummy   Probiotic Product (PROBIOTIC GUMMIES PO) 12/3/2024 Morning Patient Yes Yes   Sig: Take 2 Tablets by mouth every day.   amLODIPine (NORVASC) 2.5 MG Tab 12/4/2024 at  5:00 AM Patient No Yes   Sig: Take 1 Tablet by mouth every day.   amoxicillin-clavulanate (AUGMENTIN) 875-125 MG Tab Not Taking Patient No No   Sig: Take 1 Tablet by mouth 2 times a day.   Patient not taking: Reported on 12/4/2024   cetirizine (ZYRTEC) 1 MG/ML Solution oral solution Not Taking Patient No No   Sig: Take 10 mL by mouth every day.   Patient not taking: Reported on 12/4/2024   cetirizine (ZYRTEC) 10 MG Tab 12/3/2024 at  5:00 AM Patient Yes Yes   Sig: Take 10 mg by mouth every day.   furosemide (LASIX) 20 MG Tab Not Taking Patient No No   Sig: Take 1 Tablet by mouth every day.   Patient not taking: Reported on 12/4/2024   guaiFENesin (MUCINEX PO) 12/3/2024 at  5:00 AM Patient Yes Yes   Sig: Take 1 Tablet by mouth every day.   hydroxychloroquine (PLAQUENIL) 200 MG Tab 12/3/2024 at  5:00 AM Patient No Yes   Sig: Take 1 Tablet by mouth every day.   levalbuterol (XOPENEX HFA) 45 MCG/ACT inhaler Not Taking Patient No No   Sig: Inhale 2 Puffs every four hours as needed for Shortness of Breath (As needed for shortness of breath, cough, wheezing.).   Patient not taking: Reported on 12/4/2024   metoprolol tartrate (LOPRESSOR) 50 MG Tab 12/3/2024 at  5:00 PM Patient No Yes   Sig: Take 1 Tablet by mouth 2 times a day.   naratriptan (AMERGE) 2.5 MG tablet Not Taking Patient No No   Sig: Take 1 Tablet by mouth as needed for Migraine.   Patient not taking: Reported on 12/4/2024    nystatin (MYCOSTATIN) 076362 UNIT/ML Suspension New Rx Patient No No   Sig: Take 5 mL by mouth 4 times a day.   ondansetron (ZOFRAN ODT) 4 MG TABLET DISPERSIBLE 12/4/2024 at  5:00 AM Patient No Yes   Sig: Take 1 Tablet by mouth every 6 hours as needed for Nausea/Vomiting.   oxyCODONE immediate release (ROXICODONE) 10 MG immediate release tablet  Patient No No   Sig: Take 1 Tablet by mouth every 6 hours as needed for Moderate Pain (Refill #3 of #3) for up to 30 days.   oxyCODONE immediate release (ROXICODONE) 10 MG immediate release tablet  Patient No No   Sig: Take 1 Tablet by mouth every four hours as needed for Moderate Pain (Refill #2 of #3) for up to 30 days.   oxyCODONE immediate release (ROXICODONE) 10 MG immediate release tablet 12/3/2024 at  5:00 PM Patient No Yes   Sig: Take 1 Tablet by mouth every 6 hours as needed for Moderate Pain (refill #1 of #3) for up to 30 days.   Patient taking differently: Take 10 mg by mouth 3 times a day.   potassium chloride SA (KDUR) 20 MEQ Tab CR Not Taking Patient No No   Sig: Take 1 Tablet by mouth 2 times a day.   Patient not taking: Reported on 12/4/2024   prochlorperazine (COMPAZINE) 10 MG Tab 11/27/2024 Patient No No   Sig: Take 1 Tablet by mouth every 6 hours as needed for Nausea/Vomiting.   spironolactone (ALDACTONE) 25 MG Tab 12/3/2024 at  5:00 AM Patient No Yes   Sig: Take 1 Tablet by mouth every day. **LAST SEEN 12/2023 .  TO ENSURE FURTHER REFILLS, KEEP SCHEDULED FOLLOW UP VISIT 11/27/2024.**   traZODone (DESYREL) 50 MG Tab Not Taking Patient No No   Sig: TAKE 1 TABLET BY MOUTH EVERY EVENING   Patient not taking: Reported on 12/4/2024   zolpidem (AMBIEN) 10 MG Tab 12/2/2024 Bedtime Patient No Yes   Sig: Take 1 Tablet by mouth at bedtime as needed for Sleep for up to 90 days.      Facility-Administered Medications: None     Physical Exam  Temp:  [36.2 °C (97.2 °F)-37 °C (98.6 °F)] 36.2 °C (97.2 °F)  Pulse:  [71-88] 71  Resp:  [16-18] 18  BP: (116168)/(5681)  116/61  SpO2:  [95 %-99 %] 97 %  Blood Pressure : (!) 168/81   Temperature: 37 °C (98.6 °F)   Pulse: 83   Respiration: 18   Pulse Oximetry: 98 %     Physical Exam  Constitutional:       General: She is not in acute distress.  HENT:      Head: Normocephalic and atraumatic.      Right Ear: External ear normal.      Left Ear: External ear normal.      Nose: No congestion or rhinorrhea.      Mouth/Throat:      Mouth: Mucous membranes are dry.      Pharynx: No oropharyngeal exudate or posterior oropharyngeal erythema.   Eyes:      General: No scleral icterus.        Right eye: No discharge.         Left eye: No discharge.      Conjunctiva/sclera: Conjunctivae normal.      Pupils: Pupils are equal, round, and reactive to light.   Cardiovascular:      Rate and Rhythm: Normal rate.      Heart sounds:      No friction rub. No gallop.   Pulmonary:      Effort: Pulmonary effort is normal.   Abdominal:      General: Abdomen is flat. There is no distension.      Tenderness: There is no guarding.   Musculoskeletal:         General: No swelling.      Cervical back: Neck supple. No rigidity. No muscular tenderness.      Right lower leg: No edema.      Left lower leg: No edema.      Comments: Ecchymosis on both lower extremities, more on the left   Skin:     General: Skin is dry.      Capillary Refill: Capillary refill takes 2 to 3 seconds.      Coloration: Skin is pale. Skin is not jaundiced.      Findings: No bruising or erythema.   Neurological:      Mental Status: She is alert and oriented to person, place, and time.   Psychiatric:         Mood and Affect: Mood normal.         Judgment: Judgment normal.       Laboratory:  Recent Labs     12/02/24  1146 12/04/24  1103   WBC 9.7 9.8   RBC 4.52 4.51   HEMOGLOBIN 14.2 14.0   HEMATOCRIT 42.3 41.4   MCV 93.6 91.8   MCH 31.4 31.0   MCHC 33.6 33.8   RDW 44.0 41.8   PLATELETCT 274 238   MPV 10.9 10.4     Recent Labs     12/02/24  1146 12/04/24  1103   SODIUM 137 139   POTASSIUM 4.7 4.6  "  CHLORIDE 101 106   CO2 21 17*   GLUCOSE 126* 98   BUN 44* 57*   CREATININE 2.51* 2.50*   CALCIUM 10.8* 10.2     Recent Labs     12/02/24  1146 12/04/24  1103   ALTSGPT 27 35   ASTSGOT 42 43   ALKPHOSPHAT 97 110*   TBILIRUBIN 0.9 0.6   LIPASE  --  63   GLUCOSE 126* 98         No results for input(s): \"NTPROBNP\" in the last 72 hours.      Recent Labs     12/04/24  1103   TROPONINT 19     Imaging:  DX-CHEST-PORTABLE (1 VIEW)   Final Result      Linear atelectasis within the left lung.      CT-ABDOMEN-PELVIS W/O   Final Result      1.  Prior colectomy with left lower quadrant ostomy site.      2.  No evidence of bowel obstruction or focal inflammatory change.      3.  Linear atelectasis versus scarring within the left lung base.        Assessment/Plan:  Justification for Admission Status  I anticipate this patient is appropriate for observation status at this time because patient has acute kidney injury secondary to severe dehydration and increased ostomy output.  Will require intravenous fluids, trend of renal function electrolytes and urine output.    Patient will need a Med/Surg bed on MEDICAL service.      * FLORES (acute kidney injury) (HCC)- (present on admission)  Assessment & Plan  Mostly due to dehydration   I will start intravenous fluids  Avoid / minimize nephrotoxins as much as possible.  Monitor inputs and outputs  I will hold home furosemide for now, given her acute kidney injury and dehydration     Intractable nausea and vomiting- (present on admission)  Assessment & Plan  We will check CT abdomen pelvis for further evaluation.  Bowel rest with supportive care and a modified diet  Intravenous fluids.  I will check gastrointestinal PCR panel  CT abdomen shows prior colectomy with left lower quadrant ostomy site. No evidence of bowel obstruction or focal inflammatory change.   Antiemetics as needed    Increased ostomy output- (present on admission)  Assessment & Plan  We will check CT abdomen pelvis for " further evaluation.  Bowel rest with supportive care and a modified diet  Intravenous fluids.  I will check gastrointestinal PCR panel  CT abdomen shows prior colectomy with left lower quadrant ostomy site. No evidence of bowel obstruction or focal inflammatory change.     Easy bruising- (present on admission)  Assessment & Plan  Platelet count appear within normal limits.  Hemoglobin appears stable.  I will monitor hemoglobin and platelet count with daily labs.     Metabolic acidosis- (present on admission)  Assessment & Plan  Likely due to reduced intravascular volume and increase bicarb losses through increased ostomy output  I will start intravenous fluids and bicarb  Anticipate improvement with intravenous fluids  Continue to monitor, I will order follow-up bicarb level         Chronic diastolic congestive heart failure (HCC)- (present on admission)  Assessment & Plan  Not currently in exacerbation but is at risk for so.  I will start amlodipine and metoprolol with hold parameters  I will hold home furosemide for now, given her acute kidney injury and dehydration      Essential hypertension- (present on admission)  Assessment & Plan  I will start amlodipine and metoprolol with hold parameters  I will hold home furosemide for now, given her acute kidney injury and dehydration    Dehydration- (present on admission)  Assessment & Plan  Likely secondary to reduced oral intake, and increase in ostomy output  I will hold home furosemide for now   Encourage oral intake as tolerated, antiemetics as needed.  Intravenous hydration until adequate oral intake is achieved     Atrial fibrillation (HCC)- (present on admission)  Assessment & Plan  Does not tolerate anticoagulation due to easy bleeding  I will resume home metoprolol with hold parameters    Crohn's disease of small intestine with complication (HCC)- (present on admission)  Assessment & Plan  Status post colectomy.  Now has ileostomy     ACP (advance care  planning)- (present on admission)  Assessment & Plan  I had a discussion with the patient regarding goals of care, diagnoses, prognosis, and CODE STATUS.  We discussed her diagnoses, prognosis and comorbidities. The patient has advanced age of 69 years.  She has a number of chronic medical problems including chronic disease, chronic heart failure, and is presenting with intractable nausea and vomiting with severe dehydration.  However the patient enjoys a very good cognitive and functional capacity.  She hopes to live another 30 years.  At this point the patient wants to maintain a full code.  She is open to all forms of invasive or noninvasive diagnostic and therapeutic interventions.      GERD (gastroesophageal reflux disease)- (present on admission)  Assessment & Plan  I will start Tums as needed       VTE prophylaxis: SCDs/TEDs and heparin ppx    I had a discussion with the patient regarding goals of care, diagnoses, prognosis, and CODE STATUS.  We discussed her diagnoses, prognosis and comorbidities. The patient has advanced age of 69 years.  She has a number of chronic medical problems including chronic disease, chronic heart failure, and is presenting with intractable nausea and vomiting with severe dehydration.  However the patient enjoys a very good cognitive and functional capacity.  She hopes to live another 30 years.  At this point the patient wants to maintain a full code.  She is open to all forms of invasive or noninvasive diagnostic and therapeutic interventions. I spent 18 minutes on advanced care planning.

## 2024-12-04 NOTE — ED NOTES
Medication history reviewed with pt. Med rec is complete.  Allergies reviewed, per pt    Pt reports that she has not taken her AZELASTINE 137MCG/SPRAY, FLONASE 50MCG/ACT, FUROSEMIDE 20MG, XOPENEX 45MCG/ACT, NARATRIPTAN 2.5MG, POTASSIUM 20MEQ, or TRAZODONE 50MG in the last 30 days or longer.    Pt reports that she took ZOFRAN 4MG today at 0500, but threw it up.    Patient has not had any outpatient antibiotics in the last 30 days.    Pt is not on any anticoagulants

## 2024-12-04 NOTE — ASSESSMENT & PLAN NOTE
Does not tolerate anticoagulation due to easy bleeding  I will resume home metoprolol with hold parameters

## 2024-12-04 NOTE — ASSESSMENT & PLAN NOTE
Not currently in exacerbation but is at risk for so.  I will start amlodipine and metoprolol with hold parameters  I will hold home furosemide for now, given her acute kidney injury and dehydration

## 2024-12-04 NOTE — ASSESSMENT & PLAN NOTE
Likely secondary to reduced oral intake, and increase in ostomy output  I will hold home furosemide for now   Encourage oral intake as tolerated, antiemetics as needed.  Intravenous hydration until adequate oral intake is achieved

## 2024-12-04 NOTE — ASSESSMENT & PLAN NOTE
Likely due to reduced intravascular volume and increase bicarb losses through increased ostomy output  I will start intravenous fluids and bicarb  Anticipate improvement with intravenous fluids  Continue to monitor, I will order follow-up bicarb level

## 2024-12-04 NOTE — ASSESSMENT & PLAN NOTE
Platelet count appear within normal limits.  Hemoglobin appears stable.  I will monitor hemoglobin and platelet count with daily labs.

## 2024-12-04 NOTE — ED TRIAGE NOTES
Pt to ed by carlos from home.  C/o n/v hx of Chron's    Pt refused all treatment offered from [facility]

## 2024-12-04 NOTE — ASSESSMENT & PLAN NOTE
Mostly due to dehydration   I will start intravenous fluids  Avoid / minimize nephrotoxins as much as possible.  Monitor inputs and outputs  I will hold home furosemide for now, given her acute kidney injury and dehydration

## 2024-12-04 NOTE — ASSESSMENT & PLAN NOTE
I had a discussion with the patient regarding goals of care, diagnoses, prognosis, and CODE STATUS.  We discussed her diagnoses, prognosis and comorbidities. The patient has advanced age of 69 years.  She has a number of chronic medical problems including chronic disease, chronic heart failure, and is presenting with intractable nausea and vomiting with severe dehydration.  However the patient enjoys a very good cognitive and functional capacity.  She hopes to live another 30 years.  At this point the patient wants to maintain a full code.  She is open to all forms of invasive or noninvasive diagnostic and therapeutic interventions.

## 2024-12-04 NOTE — ASSESSMENT & PLAN NOTE
We will check CT abdomen pelvis for further evaluation.  Bowel rest with supportive care and a modified diet  Intravenous fluids.  I will check gastrointestinal PCR panel  CT abdomen shows prior colectomy with left lower quadrant ostomy site. No evidence of bowel obstruction or focal inflammatory change.

## 2024-12-04 NOTE — ED NOTES
Pt medicated per MAR, tolerated oral medication well, no coughing or choking noted;    Pt denied having any needs at this time, no distress noted;

## 2024-12-04 NOTE — ASSESSMENT & PLAN NOTE
I will start amlodipine and metoprolol with hold parameters  I will hold home furosemide for now, given her acute kidney injury and dehydration

## 2024-12-04 NOTE — ASSESSMENT & PLAN NOTE
We will check CT abdomen pelvis for further evaluation.  Bowel rest with supportive care and a modified diet  Intravenous fluids.  I will check gastrointestinal PCR panel  CT abdomen shows prior colectomy with left lower quadrant ostomy site. No evidence of bowel obstruction or focal inflammatory change.   Antiemetics as needed

## 2024-12-05 LAB
ALBUMIN SERPL BCP-MCNC: 3.9 G/DL (ref 3.2–4.9)
ALBUMIN/GLOB SERPL: 1.6 G/DL
ALP SERPL-CCNC: 91 U/L (ref 30–99)
ALT SERPL-CCNC: 26 U/L (ref 2–50)
ANION GAP SERPL CALC-SCNC: 10 MMOL/L (ref 7–16)
AST SERPL-CCNC: 30 U/L (ref 12–45)
BILIRUB SERPL-MCNC: 0.3 MG/DL (ref 0.1–1.5)
BUN SERPL-MCNC: 60 MG/DL (ref 8–22)
CALCIUM ALBUM COR SERPL-MCNC: 9.2 MG/DL (ref 8.5–10.5)
CALCIUM SERPL-MCNC: 9.1 MG/DL (ref 8.4–10.2)
CHLORIDE SERPL-SCNC: 107 MMOL/L (ref 96–112)
CO2 SERPL-SCNC: 22 MMOL/L (ref 20–33)
CREAT SERPL-MCNC: 2.02 MG/DL (ref 0.5–1.4)
CRP SERPL HS-MCNC: <0.3 MG/DL (ref 0–0.75)
ERYTHROCYTE [DISTWIDTH] IN BLOOD BY AUTOMATED COUNT: 42.8 FL (ref 35.9–50)
ERYTHROCYTE [SEDIMENTATION RATE] IN BLOOD BY WESTERGREN METHOD: 5 MM/HOUR (ref 0–25)
GFR SERPLBLD CREATININE-BSD FMLA CKD-EPI: 26 ML/MIN/1.73 M 2
GLOBULIN SER CALC-MCNC: 2.5 G/DL (ref 1.9–3.5)
GLUCOSE SERPL-MCNC: 105 MG/DL (ref 65–99)
HCT VFR BLD AUTO: 32.8 % (ref 37–47)
HGB BLD-MCNC: 11.2 G/DL (ref 12–16)
MAGNESIUM SERPL-MCNC: 1.6 MG/DL (ref 1.5–2.5)
MCH RBC QN AUTO: 31.7 PG (ref 27–33)
MCHC RBC AUTO-ENTMCNC: 34.1 G/DL (ref 32.2–35.5)
MCV RBC AUTO: 92.9 FL (ref 81.4–97.8)
PLATELET # BLD AUTO: 169 K/UL (ref 164–446)
PMV BLD AUTO: 10.7 FL (ref 9–12.9)
POTASSIUM SERPL-SCNC: 4.3 MMOL/L (ref 3.6–5.5)
PROT SERPL-MCNC: 6.4 G/DL (ref 6–8.2)
RBC # BLD AUTO: 3.53 M/UL (ref 4.2–5.4)
SODIUM SERPL-SCNC: 139 MMOL/L (ref 135–145)
WBC # BLD AUTO: 7.5 K/UL (ref 4.8–10.8)

## 2024-12-05 PROCEDURE — 99233 SBSQ HOSP IP/OBS HIGH 50: CPT | Performed by: STUDENT IN AN ORGANIZED HEALTH CARE EDUCATION/TRAINING PROGRAM

## 2024-12-05 PROCEDURE — 36415 COLL VENOUS BLD VENIPUNCTURE: CPT

## 2024-12-05 PROCEDURE — 700111 HCHG RX REV CODE 636 W/ 250 OVERRIDE (IP): Performed by: STUDENT IN AN ORGANIZED HEALTH CARE EDUCATION/TRAINING PROGRAM

## 2024-12-05 PROCEDURE — 700105 HCHG RX REV CODE 258: Performed by: HOSPITALIST

## 2024-12-05 PROCEDURE — 86140 C-REACTIVE PROTEIN: CPT

## 2024-12-05 PROCEDURE — 700105 HCHG RX REV CODE 258: Performed by: STUDENT IN AN ORGANIZED HEALTH CARE EDUCATION/TRAINING PROGRAM

## 2024-12-05 PROCEDURE — A9270 NON-COVERED ITEM OR SERVICE: HCPCS | Performed by: HOSPITALIST

## 2024-12-05 PROCEDURE — 85027 COMPLETE CBC AUTOMATED: CPT

## 2024-12-05 PROCEDURE — 700102 HCHG RX REV CODE 250 W/ 637 OVERRIDE(OP): Mod: JZ | Performed by: HOSPITALIST

## 2024-12-05 PROCEDURE — 96366 THER/PROPH/DIAG IV INF ADDON: CPT

## 2024-12-05 PROCEDURE — 80053 COMPREHEN METABOLIC PANEL: CPT

## 2024-12-05 PROCEDURE — 96365 THER/PROPH/DIAG IV INF INIT: CPT

## 2024-12-05 PROCEDURE — 94760 N-INVAS EAR/PLS OXIMETRY 1: CPT

## 2024-12-05 PROCEDURE — 85652 RBC SED RATE AUTOMATED: CPT

## 2024-12-05 PROCEDURE — 700111 HCHG RX REV CODE 636 W/ 250 OVERRIDE (IP): Mod: JZ | Performed by: HOSPITALIST

## 2024-12-05 PROCEDURE — 97602 WOUND(S) CARE NON-SELECTIVE: CPT

## 2024-12-05 PROCEDURE — 83735 ASSAY OF MAGNESIUM: CPT

## 2024-12-05 PROCEDURE — 302106 OSTOMY POWDER: Performed by: STUDENT IN AN ORGANIZED HEALTH CARE EDUCATION/TRAINING PROGRAM

## 2024-12-05 PROCEDURE — 96372 THER/PROPH/DIAG INJ SC/IM: CPT

## 2024-12-05 PROCEDURE — 96376 TX/PRO/DX INJ SAME DRUG ADON: CPT

## 2024-12-05 PROCEDURE — G0378 HOSPITAL OBSERVATION PER HR: HCPCS

## 2024-12-05 RX ORDER — SODIUM CHLORIDE 9 MG/ML
INJECTION, SOLUTION INTRAVENOUS CONTINUOUS
Status: ACTIVE | OUTPATIENT
Start: 2024-12-05 | End: 2024-12-05

## 2024-12-05 RX ORDER — MAGNESIUM SULFATE HEPTAHYDRATE 40 MG/ML
4 INJECTION, SOLUTION INTRAVENOUS ONCE
Status: COMPLETED | OUTPATIENT
Start: 2024-12-05 | End: 2024-12-05

## 2024-12-05 RX ADMIN — PROCHLORPERAZINE EDISYLATE 10 MG: 5 INJECTION INTRAMUSCULAR; INTRAVENOUS at 08:53

## 2024-12-05 RX ADMIN — OXYCODONE HYDROCHLORIDE 10 MG: 10 TABLET ORAL at 06:40

## 2024-12-05 RX ADMIN — OXYCODONE HYDROCHLORIDE 10 MG: 10 TABLET ORAL at 18:08

## 2024-12-05 RX ADMIN — ONDANSETRON 4 MG: 2 INJECTION INTRAMUSCULAR; INTRAVENOUS at 12:39

## 2024-12-05 RX ADMIN — NYSTATIN 500000 UNITS: 100000 SUSPENSION ORAL at 21:24

## 2024-12-05 RX ADMIN — METOPROLOL TARTRATE 50 MG: 50 TABLET, FILM COATED ORAL at 05:19

## 2024-12-05 RX ADMIN — ONDANSETRON 4 MG: 2 INJECTION INTRAMUSCULAR; INTRAVENOUS at 07:40

## 2024-12-05 RX ADMIN — NYSTATIN 500000 UNITS: 100000 SUSPENSION ORAL at 12:39

## 2024-12-05 RX ADMIN — SODIUM CHLORIDE: 9 INJECTION, SOLUTION INTRAVENOUS at 16:15

## 2024-12-05 RX ADMIN — NYSTATIN 500000 UNITS: 100000 SUSPENSION ORAL at 17:09

## 2024-12-05 RX ADMIN — NYSTATIN 500000 UNITS: 100000 SUSPENSION ORAL at 09:00

## 2024-12-05 RX ADMIN — DIAZEPAM 5 MG: 5 TABLET ORAL at 18:08

## 2024-12-05 RX ADMIN — SODIUM BICARBONATE 1300 MG: 650 TABLET ORAL at 17:09

## 2024-12-05 RX ADMIN — OXYCODONE HYDROCHLORIDE 10 MG: 10 TABLET ORAL at 12:38

## 2024-12-05 RX ADMIN — METOPROLOL TARTRATE 50 MG: 50 TABLET, FILM COATED ORAL at 17:09

## 2024-12-05 RX ADMIN — PROCHLORPERAZINE MALEATE 10 MG: 10 TABLET ORAL at 21:24

## 2024-12-05 RX ADMIN — HEPARIN SODIUM 5000 UNITS: 5000 INJECTION, SOLUTION INTRAVENOUS; SUBCUTANEOUS at 21:25

## 2024-12-05 RX ADMIN — ONDANSETRON 4 MG: 2 INJECTION INTRAMUSCULAR; INTRAVENOUS at 18:03

## 2024-12-05 RX ADMIN — CETIRIZINE HYDROCHLORIDE 10 MG: 10 TABLET, FILM COATED ORAL at 05:19

## 2024-12-05 RX ADMIN — MAGNESIUM SULFATE HEPTAHYDRATE 4 G: 4 INJECTION, SOLUTION INTRAVENOUS at 09:03

## 2024-12-05 RX ADMIN — HYDROXYCHLOROQUINE SULFATE 200 MG: 200 TABLET, FILM COATED ORAL at 07:45

## 2024-12-05 RX ADMIN — SODIUM BICARBONATE 1300 MG: 650 TABLET ORAL at 05:19

## 2024-12-05 RX ADMIN — SODIUM CHLORIDE, POTASSIUM CHLORIDE, SODIUM LACTATE AND CALCIUM CHLORIDE: 600; 310; 30; 20 INJECTION, SOLUTION INTRAVENOUS at 02:58

## 2024-12-05 ASSESSMENT — PAIN DESCRIPTION - PAIN TYPE
TYPE: ACUTE PAIN

## 2024-12-05 NOTE — DIETARY
Nutrition Services: Initial Assessment     Day of admit. Jana Overton is 69 y.o., female with admitting DX of FLORES (acute kidney injury) (HCC) [N17.9].    Consult Received for...: MST and BMI    Current Hospital Problems List:    FLORES (acute kidney injury)  Intractable nausea and vomiting  Increased ostomy output  Easy bruising  Metabolic acidosis  Chronic diastolic CHF  Essential HTN  Dehydration  Atrial fibrillation  Crohn's disease of small intestine w/ complication- s/p colectomy w/ ileostomy placement  GERD       Nutrition Assessment:      Height: 182.9 cm (6')  Weight: 60.5 kg (133 lb 6.1 oz)  Weight taken via: Bed Scale  BMI Calculated: 18.09  BMI Classification: Underweight       Weight Readings from Last 5 encounters:   Wt Readings from Last 5 Encounters:   12/04/24 60.5 kg (133 lb 6.1 oz)   12/02/24 59.1 kg (130 lb 4.7 oz)   11/27/24 59.1 kg (130 lb 6.4 oz)   11/25/24 60 kg (132 lb 3.2 oz)   10/11/24 57.8 kg (127 lb 6.4 oz)   08/20/24 59.4 kg (131 lb)   06/11/24 66.7 kg (147 lb)   12/11/24 65.3 kg (144 lb)       Objective:   Pertinent Medical Hx: severe Crohn's disease s/p colectomy end ileostomy   Pertinent Labs: 12/5/24: Bun=60, creatinine=2.02, GFR=26, mag=1.6, potassium=4.3, sodium=139  Pertinent Meds: mag sulfate IV,  LR @ 100 mL/hour  Last BM:  Type 6: Fluffy pieces with ragged edges, a mushy stool  12/05/24     Current Diet Order/Intake:   Low fiber diet No documentation of meals yet. PO of snack has been >50%. Type of snack provided not recorded in flowsheets.      Subjective:   RD visited pt in her room. Meal preferences obtained. She does not like Ensure supplements. She most likely will not like Glucerna either. She agreed to Magic Cups with meals and high protein snacks TID. She also has an order for double portions. Pt is eager to gain weight.   Patient reported UBW: 145-155 lb.   Dietary Recall/Energy Intake: Pt said that overall she was eating well PTA but she was not able to use any of  her nutrition due to N/V.  This indicates Insufficient energy intake.       Nutrition Focused Physical Exam (NFPE)  Weight Loss: Pt said that she has lost 25 lb (16%) x 2-3 months. Of note, pt's wt loss per Epic wt review was 9% x 6 months. Wt loss is not significant but is worth noting. RD Suspects this change related to inability to use nutrition consumed due to N/V w/ Crohn's flare.  Muscle Mass: mild loss noted in temple region  Subcutaneous Fat: Unable to identify at this time  Fluid Accumulation: none noted.   Reduced  Strength: N/A in acute care setting.    Nutrition Diagnosis:      Altered GI Function related to inability to use nutrition due to Crohn's flare as evidenced by report of N/V/D immediately after all intake.      Based on RD assessment at this time, Patient does not meet criteria in congruence with ASPEN/Academy guidelines for malnutrition    Nutrition Interventions:      1. Provide high protein snacks TID   2. Recommend Magic Cup (provides 290 calories, 9 g protein per 4 fl oz) TID.  3. Patient aware of active plan of care as appropriate.  4.  Double portions on all meal trays.      Nutrition Monitoring and Evaluation:      Monitor nutrition POC, goal for >50% intake from meals and supplements.  Additional fluids per MD/DO  Monitor vital signs pertinent to nutrition.    RD following and will provide updated recommendations as indicated.      Mishel Del Rio R.D.                                         [facility] MAL[employer]TRITION

## 2024-12-05 NOTE — CARE PLAN
The patient is Stable - Low risk of patient condition declining or worsening    Shift Goals  Clinical Goals: monitor labs, IV fluids, manage pain, promote oral intake  Patient Goals: go home, eat more  Family Goals: DALTON    Progress made toward(s) clinical / shift goals:    Problem: Pain - Standard  Goal: Alleviation of pain or a reduction in pain to the patient’s comfort goal  Outcome: Progressing  Note: Patient reported relief from pain medication. Patient utilized non-pharmacological pain relief measures such as ambulation and music therapy.      Problem: Knowledge Deficit - Standard  Goal: Patient and family/care givers will demonstrate understanding of plan of care, disease process/condition, diagnostic tests and medications  Outcome: Progressing  Note: Patient verbalized understanding of POC including monitoring labs, diet, and IV fluids.      Problem: Fall Risk  Goal: Patient will remain free from falls  Outcome: Progressing  Note: Patient refused the use of a bed alarm. Patient is steady with ambulation.        Patient is not progressing towards the following goals:

## 2024-12-05 NOTE — PROGRESS NOTES
4 Eyes Skin Assessment Completed by CAMERON Marcelo and CAMERON Mendoza.    Head Redness  Ears Redness and Blanching  Nose Redness and Blanching  Mouth WDL  Neck WDL  Breast/Chest Scar  Shoulder Blades Redness and Blanching  Spine Redness and Blanching  (R) Arm/Elbow/Hand Redness, Blanching, Bruising, Scab, and Discoloration  (L) Arm/Elbow/Hand Redness, Blanching, and Non-Blanching  Abdomen WDL ostomy  Groin WDL  Scrotum/Coccyx/Buttocks Redness and Blanching  (R) Leg Scar  (L) Leg Scar and Bruising  (R) Heel/Foot/Toe Redness, Blanching, and Bruising  (L) Heel/Foot/Toe Redness, Blanching, and Bruising          Devices In Places Tele Box      Interventions In Place Pillows and Pressure Redistribution Mattress    Possible Skin Injury Yes    Pictures Uploaded Into Epic Yes  Wound Consult Placed N/A  RN Wound Prevention Protocol Ordered Yes

## 2024-12-05 NOTE — CARE PLAN
The patient is Stable - Low risk of patient condition declining or worsening    Shift Goals  Clinical Goals: pain <7/10, IV fluids, monitor labs  Patient Goals: Be able to eat, be free from nausea  Family Goals: DALTON    Progress made toward(s) clinical / shift goals:    Problem: Pain - Standard  Goal: Alleviation of pain or a reduction in pain to the patient’s comfort goal  Outcome: Progressing     Problem: Knowledge Deficit - Standard  Goal: Patient and family/care givers will demonstrate understanding of plan of care, disease process/condition, diagnostic tests and medications  Outcome: Progressing     Problem: Fall Risk  Goal: Patient will remain free from falls  Outcome: Progressing       Patient is not progressing towards the following goals:

## 2024-12-05 NOTE — RESPIRATORY CARE
"   COPD EDUCATION by COPD CLINICAL EDUCATOR  12/5/2024 at 1:30 PM by Georgette Gao, RRT     Patient reviewed by COPD education team. Patient does not have a history or diagnosis of COPD and is a non-smoker.  Therefore, patient does not qualify for the COPD program.    COPD Screen  COPD Risk Screening  Do you have a history of COPD?: No ([address] [facility] gave dx COPD, 2022 Dr Tinoco note 11/9/22 Moderate persistent asthma without complication)  Do you have a Pulmonologist?: Yes (Hasnt seen since 2022)    COPD Assessment  COPD Clinical Specialists ONLY  COPD Education Initiated: Yes--Short Intervention (Talked to pt re COPD, per Dr. Tinoco note pt has Moderate Persistant asthma, pt  uses Xopenex inhaler and uses very rarely, pt states would like to get back into Pulmonary as Dr. Tinoco suggested pt be on an ICS will check w/ scheduling, life long non smoker)  Is this a COPD exacerbation patient?: No  DME Company: none  Physician Name: HANK CANSECO D.O.  Pulmonary Follow Up Appointment: 03/05/25  Appt Time: 1130  Pulmonologist Name: Megan Monahan  Referrals Initiated: Yes  Pulmonary Rehab: N/A  Smoking Cessation: N/A  Hospice: N/A  Home Health Care: N/A  Mobile Urgent Care Services: N/A  Geriatric Specialty Group: N/A  Private In-Home Care Agency: N/A    PFT Results    No results found for: \"PFT\"    Meds to Beds  [facility] provides bedside medication delivery for all eligible patients at discharge and you have been automatically enrolled in the Meds to Beds Program. Would you like to opt out of this program for any reason?: Yes - Opt Out  Reason the patient would like to opt out of Bedside Medication Delivery at Discharge?: Patient prefers another pharmacy     MY COPD ACTION PLAN     It is recommended that patients and physicians /healthcare providers complete this action plan together. This plan should be discussed at each physician visit and updated as needed.    The green, yellow and red zones show groups of symptoms " "of COPD. This list of symptoms is not comprehensive, and you may experience other symptoms. In the \"Actions\" column, your healthcare provider has recommended actions for you to take based on your symptoms.    Patient Name: Jana Overton   YOB: 1955   Last Updated on: 12/5/2024  1:30 PM   Green Zone:  I am doing well today Actions     Usual activitiy and exercise level   Take daily medications     Usual amounts of cough and phlegm/mucus   Use oxygen as prescribed     Sleep well at night   Continue regular exercise/diet plan     Appetite is good   At all times avoid cigarette smoke, inhaled irritants     Daily Medications (these medications are taken every day):                Yellow Zone:  I am having a bad day or a COPD flare Actions     More breathless than usual   Continue daily medications     I have less energy for my daily activities   Use quick relief inhaler as ordered     Increased or thicker phlegm/mucus   Use oxygen as prescribed     Using quick relief inhaler/nebulizer more often   Get plenty of rest     Swelling of ankles more than usual   Use pursed lip breathing     More coughing than usual   At all times avoid cigarette smoke, inhaled irritants     I feel like I have a \"chest cold\"     Poor sleep and my symptoms woke me up     My appetite is not good     My medicine is not helping      Call provider immediately if symptoms don’t improve     Continue daily medications, add rescue medications:               Medications to be used during a flare up, (as Discussed with Provider):              Red Zone:  I need urgent medical care Actions     Severe shortness of breath even at rest   Call 911 or seek medical care immediately     Not able to do any activity because of breathing      Fever or shaking chills      Feeling confused or very drowsy       Chest pains      Coughing up blood                  "

## 2024-12-05 NOTE — PROGRESS NOTES
Patient admitted to room 314. Tele monitor in place. Discussed high fall risk status. Patient refused bed alarm and acknowledged education.

## 2024-12-06 LAB
ANION GAP SERPL CALC-SCNC: 8 MMOL/L (ref 7–16)
BASOPHILS # BLD AUTO: 0.6 % (ref 0–1.8)
BASOPHILS # BLD: 0.04 K/UL (ref 0–0.12)
BUN SERPL-MCNC: 43 MG/DL (ref 8–22)
CALCIUM SERPL-MCNC: 8.5 MG/DL (ref 8.4–10.2)
CHLORIDE SERPL-SCNC: 109 MMOL/L (ref 96–112)
CO2 SERPL-SCNC: 24 MMOL/L (ref 20–33)
CREAT SERPL-MCNC: 1.4 MG/DL (ref 0.5–1.4)
EOSINOPHIL # BLD AUTO: 0.14 K/UL (ref 0–0.51)
EOSINOPHIL NFR BLD: 2.2 % (ref 0–6.9)
ERYTHROCYTE [DISTWIDTH] IN BLOOD BY AUTOMATED COUNT: 44 FL (ref 35.9–50)
GFR SERPLBLD CREATININE-BSD FMLA CKD-EPI: 40 ML/MIN/1.73 M 2
GLUCOSE SERPL-MCNC: 111 MG/DL (ref 65–99)
HCT VFR BLD AUTO: 30.4 % (ref 37–47)
HGB BLD-MCNC: 10 G/DL (ref 12–16)
IMM GRANULOCYTES # BLD AUTO: 0.02 K/UL (ref 0–0.11)
IMM GRANULOCYTES NFR BLD AUTO: 0.3 % (ref 0–0.9)
LYMPHOCYTES # BLD AUTO: 1.56 K/UL (ref 1–4.8)
LYMPHOCYTES NFR BLD: 24 % (ref 22–41)
MAGNESIUM SERPL-MCNC: 2 MG/DL (ref 1.5–2.5)
MCH RBC QN AUTO: 30.9 PG (ref 27–33)
MCHC RBC AUTO-ENTMCNC: 32.9 G/DL (ref 32.2–35.5)
MCV RBC AUTO: 93.8 FL (ref 81.4–97.8)
MONOCYTES # BLD AUTO: 0.7 K/UL (ref 0–0.85)
MONOCYTES NFR BLD AUTO: 10.8 % (ref 0–13.4)
NEUTROPHILS # BLD AUTO: 4.05 K/UL (ref 1.82–7.42)
NEUTROPHILS NFR BLD: 62.1 % (ref 44–72)
NRBC # BLD AUTO: 0 K/UL
NRBC BLD-RTO: 0 /100 WBC (ref 0–0.2)
PHOSPHATE SERPL-MCNC: 2 MG/DL (ref 2.5–4.5)
PLATELET # BLD AUTO: 149 K/UL (ref 164–446)
PMV BLD AUTO: 10.8 FL (ref 9–12.9)
POTASSIUM SERPL-SCNC: 4.3 MMOL/L (ref 3.6–5.5)
RBC # BLD AUTO: 3.24 M/UL (ref 4.2–5.4)
SODIUM SERPL-SCNC: 141 MMOL/L (ref 135–145)
WBC # BLD AUTO: 6.5 K/UL (ref 4.8–10.8)

## 2024-12-06 PROCEDURE — 700111 HCHG RX REV CODE 636 W/ 250 OVERRIDE (IP): Performed by: HOSPITALIST

## 2024-12-06 PROCEDURE — 700101 HCHG RX REV CODE 250: Performed by: STUDENT IN AN ORGANIZED HEALTH CARE EDUCATION/TRAINING PROGRAM

## 2024-12-06 PROCEDURE — 94760 N-INVAS EAR/PLS OXIMETRY 1: CPT

## 2024-12-06 PROCEDURE — 700102 HCHG RX REV CODE 250 W/ 637 OVERRIDE(OP): Performed by: HOSPITALIST

## 2024-12-06 PROCEDURE — 96367 TX/PROPH/DG ADDL SEQ IV INF: CPT

## 2024-12-06 PROCEDURE — 96376 TX/PRO/DX INJ SAME DRUG ADON: CPT

## 2024-12-06 PROCEDURE — 700105 HCHG RX REV CODE 258: Performed by: STUDENT IN AN ORGANIZED HEALTH CARE EDUCATION/TRAINING PROGRAM

## 2024-12-06 PROCEDURE — 700102 HCHG RX REV CODE 250 W/ 637 OVERRIDE(OP): Performed by: STUDENT IN AN ORGANIZED HEALTH CARE EDUCATION/TRAINING PROGRAM

## 2024-12-06 PROCEDURE — 80048 BASIC METABOLIC PNL TOTAL CA: CPT

## 2024-12-06 PROCEDURE — G0378 HOSPITAL OBSERVATION PER HR: HCPCS

## 2024-12-06 PROCEDURE — A9270 NON-COVERED ITEM OR SERVICE: HCPCS | Performed by: STUDENT IN AN ORGANIZED HEALTH CARE EDUCATION/TRAINING PROGRAM

## 2024-12-06 PROCEDURE — 83735 ASSAY OF MAGNESIUM: CPT

## 2024-12-06 PROCEDURE — 85025 COMPLETE CBC W/AUTO DIFF WBC: CPT

## 2024-12-06 PROCEDURE — 99233 SBSQ HOSP IP/OBS HIGH 50: CPT | Performed by: STUDENT IN AN ORGANIZED HEALTH CARE EDUCATION/TRAINING PROGRAM

## 2024-12-06 PROCEDURE — 96372 THER/PROPH/DIAG INJ SC/IM: CPT

## 2024-12-06 PROCEDURE — A9270 NON-COVERED ITEM OR SERVICE: HCPCS | Performed by: HOSPITALIST

## 2024-12-06 PROCEDURE — 36415 COLL VENOUS BLD VENIPUNCTURE: CPT

## 2024-12-06 PROCEDURE — 84100 ASSAY OF PHOSPHORUS: CPT

## 2024-12-06 RX ORDER — DEXTROMETHORPHAN POLISTIREX 30 MG/5ML
60 SUSPENSION ORAL 2 TIMES DAILY
Status: DISCONTINUED | OUTPATIENT
Start: 2024-12-06 | End: 2024-12-09 | Stop reason: HOSPADM

## 2024-12-06 RX ORDER — LIDOCAINE 4 G/G
2 PATCH TOPICAL EVERY 24 HOURS
Status: DISCONTINUED | OUTPATIENT
Start: 2024-12-06 | End: 2024-12-09 | Stop reason: HOSPADM

## 2024-12-06 RX ORDER — CETIRIZINE HYDROCHLORIDE 10 MG/1
10 TABLET ORAL DAILY
Status: CANCELLED | OUTPATIENT
Start: 2024-12-06

## 2024-12-06 RX ORDER — GUAIFENESIN 600 MG/1
600 TABLET, EXTENDED RELEASE ORAL EVERY 12 HOURS
Status: DISCONTINUED | OUTPATIENT
Start: 2024-12-06 | End: 2024-12-09 | Stop reason: HOSPADM

## 2024-12-06 RX ADMIN — ONDANSETRON 4 MG: 2 INJECTION INTRAMUSCULAR; INTRAVENOUS at 12:16

## 2024-12-06 RX ADMIN — LIDOCAINE 2 PATCH: 4 PATCH TOPICAL at 10:55

## 2024-12-06 RX ADMIN — ZOLPIDEM TARTRATE 10 MG: 5 TABLET ORAL at 22:07

## 2024-12-06 RX ADMIN — PROCHLORPERAZINE MALEATE 10 MG: 10 TABLET ORAL at 19:33

## 2024-12-06 RX ADMIN — PROCHLORPERAZINE MALEATE 10 MG: 10 TABLET ORAL at 05:35

## 2024-12-06 RX ADMIN — HEPARIN SODIUM 5000 UNITS: 5000 INJECTION, SOLUTION INTRAVENOUS; SUBCUTANEOUS at 20:34

## 2024-12-06 RX ADMIN — ONDANSETRON 4 MG: 2 INJECTION INTRAMUSCULAR; INTRAVENOUS at 09:31

## 2024-12-06 RX ADMIN — METOPROLOL TARTRATE 50 MG: 50 TABLET, FILM COATED ORAL at 05:11

## 2024-12-06 RX ADMIN — CETIRIZINE HYDROCHLORIDE 10 MG: 10 TABLET, FILM COATED ORAL at 05:11

## 2024-12-06 RX ADMIN — GUAIFENESIN 600 MG: 600 TABLET, EXTENDED RELEASE ORAL at 12:16

## 2024-12-06 RX ADMIN — AMLODIPINE BESYLATE 2.5 MG: 5 TABLET ORAL at 05:11

## 2024-12-06 RX ADMIN — HEPARIN SODIUM 5000 UNITS: 5000 INJECTION, SOLUTION INTRAVENOUS; SUBCUTANEOUS at 16:06

## 2024-12-06 RX ADMIN — NYSTATIN 500000 UNITS: 100000 SUSPENSION ORAL at 12:16

## 2024-12-06 RX ADMIN — NYSTATIN 500000 UNITS: 100000 SUSPENSION ORAL at 17:37

## 2024-12-06 RX ADMIN — HYDROXYCHLOROQUINE SULFATE 200 MG: 200 TABLET, FILM COATED ORAL at 07:41

## 2024-12-06 RX ADMIN — OXYCODONE HYDROCHLORIDE 10 MG: 10 TABLET ORAL at 08:01

## 2024-12-06 RX ADMIN — DEXTROMETHORPHAN 60 MG: 30 SUSPENSION, EXTENDED RELEASE ORAL at 10:56

## 2024-12-06 RX ADMIN — HEPARIN SODIUM 5000 UNITS: 5000 INJECTION, SOLUTION INTRAVENOUS; SUBCUTANEOUS at 05:11

## 2024-12-06 RX ADMIN — DIAZEPAM 5 MG: 5 TABLET ORAL at 20:34

## 2024-12-06 RX ADMIN — SODIUM BICARBONATE 1300 MG: 650 TABLET ORAL at 05:11

## 2024-12-06 RX ADMIN — GUAIFENESIN 600 MG: 600 TABLET, EXTENDED RELEASE ORAL at 17:38

## 2024-12-06 RX ADMIN — METOPROLOL TARTRATE 50 MG: 50 TABLET, FILM COATED ORAL at 17:38

## 2024-12-06 RX ADMIN — NYSTATIN 500000 UNITS: 100000 SUSPENSION ORAL at 20:34

## 2024-12-06 RX ADMIN — SODIUM PHOSPHATE, MONOBASIC, MONOHYDRATE AND SODIUM PHOSPHATE, DIBASIC, ANHYDROUS 30 MMOL: 142; 276 INJECTION, SOLUTION INTRAVENOUS at 14:26

## 2024-12-06 RX ADMIN — NYSTATIN 500000 UNITS: 100000 SUSPENSION ORAL at 07:41

## 2024-12-06 RX ADMIN — ONDANSETRON 4 MG: 2 INJECTION INTRAMUSCULAR; INTRAVENOUS at 17:37

## 2024-12-06 RX ADMIN — DEXTROMETHORPHAN 60 MG: 30 SUSPENSION, EXTENDED RELEASE ORAL at 17:37

## 2024-12-06 RX ADMIN — OXYCODONE HYDROCHLORIDE 10 MG: 10 TABLET ORAL at 17:37

## 2024-12-06 ASSESSMENT — PAIN DESCRIPTION - PAIN TYPE
TYPE: ACUTE PAIN

## 2024-12-06 NOTE — PROGRESS NOTES
Assumed care and received report from: Ovi. Patient awake and answering questions during bedside report.

## 2024-12-06 NOTE — PROGRESS NOTES
Patients IV infiltrated at the end of day shift. IV was reestablished with Ultrasound. Normal Saline was restarted. Approximately 10 minutes after the NS was started patient complained of burning pain above the IV. Patient requested the fluids be stopped. I disconnected the fluids and flushed the line with Normal saline, patient again stated the fluids were burning. The iv site and area above the iv catheter did not show any signs of infiltration. Patient asked if the normal saline could be diluted before being infused. Patient was educated and MD notified. Per Dr Tellez fluids held.

## 2024-12-06 NOTE — CARE PLAN
The patient is Stable - Low risk of patient condition declining or worsening    Shift Goals  Clinical Goals: Monitor Labs, IV Fluids, pain managment, encourage oral intake  Patient Goals: go home, eat more, manage nausea  Family Goals: DALTON    Progress made toward(s) clinical / shift goals:    Problem: Pain - Standard  Goal: Alleviation of pain or a reduction in pain to the patient’s comfort goal  Description: Target End Date:  Prior to discharge or change in level of care    Document on Vitals flowsheet    1.  Document pain using the appropriate pain scale per order or unit policy  2.  Educate and implement non-pharmacologic comfort measures (i.e. relaxation, distraction, massage, cold/heat therapy, etc.)  3.  Pain management medications as ordered  4.  Reassess pain after pain med administration per policy  5.  If opiods administered assess patient's response to pain medication is appropriate per POSS sedation scale  6.  Follow pain management plan developed in collaboration with patient and interdisciplinary team (including palliative care or pain specialists if applicable)  Outcome: Progressing  Flowsheets (Taken 12/6/2024 0205)  Pain Rating Scale (NPRS): 3  Note: Patient had mild pain this shift. Patient did not require pain medication for her pain.     Problem: Knowledge Deficit - Standard  Goal: Patient and family/care givers will demonstrate understanding of plan of care, disease process/condition, diagnostic tests and medications  Description: Target End Date:  1-3 days or as soon as patient condition allows    Document in Patient Education    1.  Patient and family/caregiver oriented to unit, equipment, visitation policy and means for communicating concern  2.  Complete/review Learning Assessment  3.  Assess knowledge level of disease process/condition, treatment plan, diagnostic tests and medications  4.  Explain disease process/condition, treatment plan, diagnostic tests and medications  Outcome:  Progressing  Note: Plan of care discussed with the patient. Patient understands the plan of care.      Problem: Fall Risk  Goal: Patient will remain free from falls  Description: Target End Date:  Prior to discharge or change in level of care    Document interventions on the Meka Raymond Fall Risk Assessment    1.  Assess for fall risk factors  2.  Implement fall precautions  Outcome: Progressing  Note: Patient remains free of falls this shift. Patient scores as a high fall risk, but refuses the bed/strip alarm- patient educated. Patient is steady on her feet and calls when she needs assistance.        Patient is not progressing towards the following goals:

## 2024-12-06 NOTE — CARE PLAN
The patient is Stable - Low risk of patient condition declining or worsening    Shift Goals  Clinical Goals: manage N/V, pain management  Patient Goals: reduce N/V  Family Goals: DLATON    Progress made toward(s) clinical / shift goals: Patient was assessed and consulted with MD at bedside. Patient up and walking the halls. GI consult performed. Patient continued to have nausea and vomiting. Patient sat up in chair for majority of the day.       Problem: Pain - Standard  Goal: Alleviation of pain or a reduction in pain to the patient’s comfort goal  Outcome: Progressing  Note: Patient verbalizes pain using 0-10 scale. Patient uses pharmacological and non-pharmacological methods of pain management.       Problem: Knowledge Deficit - Standard  Goal: Patient and family/care givers will demonstrate understanding of plan of care, disease process/condition, diagnostic tests and medications  Outcome: Progressing  Note: Patient verbalizes understanding of plan of care and barriers to discharge. Patient asks appropriate questions about plan of care.         Patient is not progressing towards the following goals:

## 2024-12-06 NOTE — PROGRESS NOTES
[facility] Medicine Daily Progress Note    Date of Service  12/5/2024    Chief Complaint  Jana Overton is a 69 y.o. female admitted 12/4/2024 with N/V.    Hospital Course  Jana Overton is a 69 y.o. 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  Patient denies noticing fevers or chills.  She denies noticing any blood in her vomitus or her ostomy output.  Patient reports that her vomiting is so severe that is not able to tolerate anything orally including medications.     Interval Problem Update  12/5  Afebrile, normal HR/RR, -150 SpO2 94-98% on room air.  Hgb from 14-11.2, no bleeding, I suspect dilutional as all cell lines dropped, she is quite frail and was given IVF.  Renal function improving 2.50-2.02, Mag 1.6, UA not infectious.  She has long history of IBD, checked inflammatory markers and not elevated. Still with abdominal pain, has had difficulty with PO intake.   Replaced magnesium, ordered supplements, GI soft diet ordered after discussion with patient, IVF ordered for Hemal and decreased PO intake.    I have discussed this patient's plan of care and discharge plan at IDT rounds today with Case Management, Nursing, Nursing leadership, and other members of the IDT team.    Consultants/Specialty  NA    Code Status  Full Code    Disposition  The patient is not medically cleared for discharge to home or a post-acute facility.      I have placed the appropriate orders for post-discharge needs.    Review of Systems  ROS   Review of Systems   Constitutional:  Positive for malaise/fatigue. Negative for chills and fever.   Eyes:  Negative for discharge and redness.   Respiratory:  Negative for cough, shortness of breath and stridor.    Cardiovascular:  Negative for chest pain and leg swelling.   Gastrointestinal:  Positive for diarrhea, nausea and  vomiting.        Increase watery output from the ostomy site   Genitourinary:  Negative for flank pain.   Musculoskeletal:  Negative for myalgias.   Skin: Negative.    Neurological:  Negative for focal weakness.   Endo/Heme/Allergies:  Bruises/bleeds easily.   Psychiatric/Behavioral:  The patient is not nervous/anxious.    Physical Exam  Temp:  [36.3 °C (97.4 °F)-36.5 °C (97.7 °F)] 36.3 °C (97.4 °F)  Pulse:  [77-95] 77  Resp:  [16-20] 20  BP: (103-150)/(50-81) 122/54  SpO2:  [94 %-98 %] 94 %    Physical Exam  Vitals and nursing note reviewed.   Constitutional:       General: She is not in acute distress.     Appearance: She is not toxic-appearing.   HENT:      Head: Normocephalic and atraumatic.      Nose: Nose normal. No rhinorrhea.      Mouth/Throat:      Mouth: Mucous membranes are dry.      Pharynx: Oropharynx is clear.   Eyes:      General: No scleral icterus.     Extraocular Movements: Extraocular movements intact.      Conjunctiva/sclera: Conjunctivae normal.   Cardiovascular:      Rate and Rhythm: Normal rate and regular rhythm.      Pulses: Normal pulses.   Pulmonary:      Effort: No respiratory distress.      Breath sounds: No wheezing, rhonchi or rales.   Abdominal:      General: There is no distension.      Palpations: Abdomen is soft.      Tenderness: There is abdominal tenderness (moderate generalized, ostomy noted, no apparent surrounding infection, no bleeding). There is no guarding or rebound.      Comments: Multiple abdominal surgical scars   Musculoskeletal:         General: Normal range of motion.      Cervical back: Normal range of motion and neck supple. No rigidity.      Right lower leg: No edema.      Left lower leg: No edema.   Skin:     General: Skin is warm and dry.      Capillary Refill: Capillary refill takes less than 2 seconds.   Neurological:      General: No focal deficit present.      Mental Status: She is alert and oriented to person, place, and time. Mental status is at baseline.       Cranial Nerves: No cranial nerve deficit.      Motor: No weakness.   Psychiatric:         Mood and Affect: Mood normal.         Behavior: Behavior normal.         Thought Content: Thought content normal.         Judgment: Judgment normal.         Fluids    Intake/Output Summary (Last 24 hours) at 12/6/2024 0426  Last data filed at 12/5/2024 2330  Gross per 24 hour   Intake 810 ml   Output --   Net 810 ml        Laboratory  Recent Labs     12/04/24  1103 12/05/24  0149 12/06/24  0210   WBC 9.8 7.5 6.5   RBC 4.51 3.53* 3.24*   HEMOGLOBIN 14.0 11.2* 10.0*   HEMATOCRIT 41.4 32.8* 30.4*   MCV 91.8 92.9 93.8   MCH 31.0 31.7 30.9   MCHC 33.8 34.1 32.9   RDW 41.8 42.8 44.0   PLATELETCT 238 169 149*   MPV 10.4 10.7 10.8     Recent Labs     12/04/24  1103 12/05/24  0149 12/06/24  0210   SODIUM 139 139 141   POTASSIUM 4.6 4.3 4.3   CHLORIDE 106 107 109   CO2 17* 22 24   GLUCOSE 98 105* 111*   BUN 57* 60* 43*   CREATININE 2.50* 2.02* 1.40   CALCIUM 10.2 9.1 8.5                   Imaging  DX-CHEST-PORTABLE (1 VIEW)   Final Result      Linear atelectasis within the left lung.      CT-ABDOMEN-PELVIS W/O   Final Result      1.  Prior colectomy with left lower quadrant ostomy site.      2.  No evidence of bowel obstruction or focal inflammatory change.      3.  Linear atelectasis versus scarring within the left lung base.           Assessment/Plan  * FLORES (acute kidney injury) (HCC)- (present on admission)  Assessment & Plan  Mostly due to dehydration   I will start intravenous fluids  Avoid / minimize nephrotoxins as much as possible.  Monitor inputs and outputs  I will hold home furosemide for now, given her acute kidney injury and dehydration     Easy bruising- (present on admission)  Assessment & Plan  Platelet count appear within normal limits.  Hemoglobin appears stable.  I will monitor hemoglobin and platelet count with daily labs.     ACP (advance care planning)- (present on admission)  Assessment & Plan  I had a  discussion with the patient regarding goals of care, diagnoses, prognosis, and CODE STATUS.  We discussed her diagnoses, prognosis and comorbidities. The patient has advanced age of 69 years.  She has a number of chronic medical problems including chronic disease, chronic heart failure, and is presenting with intractable nausea and vomiting with severe dehydration.  However the patient enjoys a very good cognitive and functional capacity.  She hopes to live another 30 years.  At this point the patient wants to maintain a full code.  She is open to all forms of invasive or noninvasive diagnostic and therapeutic interventions.      Intractable nausea and vomiting- (present on admission)  Assessment & Plan  We will check CT abdomen pelvis for further evaluation.  Bowel rest with supportive care and a modified diet  Intravenous fluids.  I will check gastrointestinal PCR panel  CT abdomen shows prior colectomy with left lower quadrant ostomy site. No evidence of bowel obstruction or focal inflammatory change.   Antiemetics as needed    Increased ostomy output- (present on admission)  Assessment & Plan  We will check CT abdomen pelvis for further evaluation.  Bowel rest with supportive care and a modified diet  Intravenous fluids.  I will check gastrointestinal PCR panel  CT abdomen shows prior colectomy with left lower quadrant ostomy site. No evidence of bowel obstruction or focal inflammatory change.     Metabolic acidosis- (present on admission)  Assessment & Plan  Likely due to reduced intravascular volume and increase bicarb losses through increased ostomy output  I will start intravenous fluids and bicarb  Anticipate improvement with intravenous fluids  Continue to monitor, I will order follow-up bicarb level         Chronic diastolic congestive heart failure (HCC)- (present on admission)  Assessment & Plan  Not currently in exacerbation but is at risk for so.  I will start amlodipine and metoprolol with hold  parameters  I will hold home furosemide for now, given her acute kidney injury and dehydration      Essential hypertension- (present on admission)  Assessment & Plan  I will start amlodipine and metoprolol with hold parameters  I will hold home furosemide for now, given her acute kidney injury and dehydration    Dehydration- (present on admission)  Assessment & Plan  Likely secondary to reduced oral intake, and increase in ostomy output  I will hold home furosemide for now   Encourage oral intake as tolerated, antiemetics as needed.  Intravenous hydration until adequate oral intake is achieved     Atrial fibrillation (HCC)- (present on admission)  Assessment & Plan  Does not tolerate anticoagulation due to easy bleeding  I will resume home metoprolol with hold parameters    GERD (gastroesophageal reflux disease)- (present on admission)  Assessment & Plan  I will start Tums as needed     Crohn's disease of small intestine with complication (HCC)- (present on admission)  Assessment & Plan  Status post colectomy.  Now has ileostomy          VTE prophylaxis:    heparin ppx      I have performed a physical exam and reviewed and updated ROS and Plan today (12/5/2024). In review of yesterday's note (12/4/2024), there are no changes except as documented above.  Total time spent 53 minutes. I spent greater than 50% of the time for patient care, counseling, and coordination on this date, including unit/floor time, and face-to-face time with the patient as per interval events, my own review of patient's imaging and lab analysis and developing my assessment and plan above.

## 2024-12-06 NOTE — WOUND TEAM
[facility]  Inpatient Services  Wound and Skin Care Brief Evaluation    Admission Date: 12/4/2024     Last order of IP CONSULT TO WOUND CARE was found on 12/4/2024 from Hospital Encounter on 12/4/2024     HPI, PMH, SH: Reviewed    Chief Complaint   Patient presents with    N/V     Diagnosis: FLORES (acute kidney injury) (HCC) [N17.9]    Unit where seen by Wound Team: 3314/01     Wound consult placed regarding established ileostomy, sacrum, heels, and elbows. Chart and images reviewed. This discussed with bedside RN Fozia. This clinician in to assess patient. Patient pleasant and agreeable. The patient is independent with her ileostomy and has brought her own supplies from home, she did ask for ostomy powder and that has been ordered. Her left lateral hip has a purple bruise POA and not related to pressure. Sacrum, heels, and elbows are blanching.     No pressure injuries or advanced wound care needs identified. Wound consult completed. No further follow up unless indicated and consulted.     Wound 12/05/24 Other (comment) Thigh Lateral Left bruise (Active)   Date First Assessed: 12/05/24   Present on Original Admission: Yes  Hand Hygiene Completed: Yes  Primary Wound Type: Other (comment)  Location: Thigh  Wound Orientation: Lateral  Laterality: Left  Wound Description (Comments): bruise      Assessments 12/5/2024  4:00 PM   Wound Image     Site Assessment Clean;Dry;Intact;Purple   Periwound Assessment Clean;Dry;Intact;Blanchable erythema   Margins Attached edges;Defined edges   Closure Open to air   Drainage Amount None   Treatments Site care   Wound Cleansing Not Applicable   Periwound Protectant Not Applicable   Dressing Status Open to Air   NEXT Weekly Photo (Inpatient Only) 12/11/24   Wound Team Following Not following   Non-staged Wound Description Not applicable     Left heel     Right heel    Right elbow - scab    Left elbow    Sacrum    PREVENTATIVE INTERVENTIONS:    Q shift Tru - performed  per nursing policy  Q shift pressure point assessments - performed per nursing policy

## 2024-12-06 NOTE — CONSULTS
Gastroenterology Consult Note     Date of Consult: 12/6/2024    Requesting Physician: Jan Vargas MD     Reason for consult: Dysphagia     History of Present Illness: This is a 69-year-old female with Crohn's disease (s/p colectomy and ileostomy) and achalasia who is consulted for worsening esophageal dysphagia to both liquids and solids.  Patient is on a puréed diet.  Patient has a history of achalasia.  Patient had an esophageal manometry in 2019 when she was under care by [facility] which revealed hypertensive LES, normal peristalsis, median IRP elevated and pan esophageal pressurization suggestive of type II achalasia.  The study was limited due to patient's intolerance of the study.  Patient underwent an EGD on 6/15/2024 with TTS dilation up to 20 mm.  The dilation site was evaluated with no bleeding and minimal mucosal tear.  Patient reports that she felt improvement for 1 month but her esophageal dysphagia returned.  Patient was advised follow-up with the [facility] for further management of achalasia.  However, she never made an appointment for follow-up.  She reports that her symptoms have progressed and gotten worse.  She lost approximately 15-20 lbs in the last several months due to poor appetite.     Past Medical History:  Past Medical History:   Diagnosis Date    Anesthesia     Difficult IV stick    Anxiety     Arthritis     all over    ASTHMA     Atrial fib/flut     Backpain     Bronchitis     5 years    Chronic pain     Colostomy in place (HCC)     COPD (chronic obstructive pulmonary disease) (HCC)     Crohn's disease of colon (HCC)     Dyspnea     History of cardiac murmur     Ileostomy present ([facility])     Infectious disease     MRSA, VancoRSA    Multiple falls     Narcotic dependence ([facility])     Obstruction     Pain     Pneumonia     child and mid 30's    Postherpetic neuralgia     Rosacea     Sleep apnea         Past Surgical History:   Past Surgical History:    Procedure Laterality Date    SD UPPER GI ENDOSCOPY,DIAGNOSIS N/A 2/8/2023    Procedure: GASTROSCOPY;  Surgeon: Jamarcus Davalos M.D.;  Location: SURGERY SAME DAY [facility];  Service: Gastroenterology    SD UPPER GI ENDOSCOPY,W/DILAT,GASTRIC OUT N/A 2/8/2023    Procedure: GASTROSCOPY, WITH BALLOON DILATION;  Surgeon: Jamarcus Davalos M.D.;  Location: SURGERY SAME DAY [facility];  Service: Gastroenterology    SD UPPER GI ENDOSCOPY,BIOPSY N/A 2/8/2023    Procedure: GASTROSCOPY, WITH BIOPSY;  Surgeon: Jamarcus Davalos M.D.;  Location: SURGERY SAME DAY [facility];  Service: Gastroenterology    SD UPPER GI ENDOSCOPY,DIAGNOSIS N/A 1/16/2023    Procedure: GASTROSCOPY;  Surgeon: Jaamrcus Davalos M.D.;  Location: SURGERY SAME DAY [facility];  Service: Gastroenterology    SD UPPER GI ENDOSCOPY,W/DILAT,GASTRIC OUT N/A 1/16/2023    Procedure: GASTROSCOPY, WITH BALLOON DILATION;  Surgeon: Jamarcus Davalos M.D.;  Location: SURGERY SAME DAY [facility];  Service: Gastroenterology    SD UPPER GI ENDOSCOPY,BIOPSY N/A 1/16/2023    Procedure: GASTROSCOPY, WITH BIOPSY;  Surgeon: Jamarcus Davalos M.D.;  Location: SURGERY SAME DAY [facility];  Service: Gastroenterology    SD UPPER GI ENDOSCOPY,DIAGNOSIS N/A 10/24/2022    Procedure: GASTROSCOPY;  Surgeon: Jamarcus Davalos M.D.;  Location: SURGERY SAME DAY [facility];  Service: Gastroenterology    BILIARY DILATATION N/A 10/24/2022    Procedure: DILATION, STRICTURE, BILE DUCT;  Surgeon: Jamarcus Davalos M.D.;  Location: SURGERY SAME DAY [facility];  Service: Gastroenterology    SD CYSTOSCOPY,INSERT URETERAL STENT Right 12/23/2021    Procedure: CYSTOSCOPY, WITH URETERAL STENT EXCHANGE;  Surgeon: Jonathan Turcios M.D.;  Location: [facility];  Service: Urology    SD CYSTO/URETERO/PYELOSCOPY, DX Right 12/23/2021    Procedure: URETEROSCOPY;  Surgeon: Jonathan Turcios M.D.;  Location: [facility];  Service: Urology    SD CYSTO/URETERO/PYELOSCOPY, DX Right 12/8/2021    Procedure: URETEROSCOPY;  Surgeon:  Luc Hook M.D.;  Location: [facility];  Service: Urology    CYSTOSCOPY WITH URETERAL STENT INSERTION OR REMOVAL Right 12/8/2021    Procedure: CYSTOSCOPY, WITH URETERAL STENT INSERTION;  Surgeon: Luc Hook M.D.;  Location: [facility];  Service: Urology    ILEOSTOMY  1/20/2021    Procedure: ILEOSTOMY- COMPLEX REVISION,;  Surgeon: Kevin Cruz M.D.;  Location: SURGERY [facility];  Service: General    LYSIS ADHESIONS GENERAL  1/20/2021    Procedure: LYSIS, ADHESIONS;  Surgeon: Kevin Cruz M.D.;  Location: SURGERY [facility];  Service: General    GASTROSCOPY N/A 5/22/2019    Procedure: GASTROSCOPY - WITH DILATION;  Surgeon: Dionicio Cardona M.D.;  Location: [facility];  Service: Gastroenterology    GASTROSCOPY  1/6/2019    Procedure: GASTROSCOPY- WITH DILATION;  Surgeon: Daniel Daniels M.D.;  Location: [facility];  Service: Gastroenterology    ILEOSTOMY  12/29/2018    Procedure: ILEOSTOMY- REVISION;  Surgeon: Kevin Cruz M.D.;  Location: SURGERY [facility];  Service: General    SIGMOIDOSCOPY FLEX  12/29/2018    Procedure: SIGMOIDOSCOPY FLEX;  Surgeon: Kevin Cruz M.D.;  Location: [facility];  Service: General    EXPLORATORY LAPAROTOMY  12/29/2018    Procedure: EXPLORATORY LAPAROTOMY, lysis of adhesions;  Surgeon: Kevin Cruz M.D.;  Location: [facility];  Service: General    ILEOSTOMY  5/14/2014    Performed by Kevin Cruz M.D. at [facility] ORS    MAMMOPLASTY REDUCTION  7/17/2013    Performed by Janey Burt M.D. at [facility] ORS    HARDWARE REMOVAL NEURO  7/16/2012    Performed by KEELEY KIM at [facility]    GASTROSCOPY  10/4/2011    Performed by TYSON MARTINEZ at SURGERY SAME DAY [facility] ORS    COLONOSCOPY  10/4/2011    Performed by TYSON MARTINEZ at SURGERY SAME DAY [facility] ORS    DILATION AND CURETTAGE  9/24/2010    Performed by GAURAV ALY at  SURGERY SAME DAY [facility] ORS    ILEOSTOMY  11/11/2009    Performed by SYDNEE AUGUSTINE at SURGERY [facility] ORS    GASTROSCOPY WITH BIOPSY  11/22/08    Performed by NEGRITA HUYNH at SURGERY [facility] ORS    ABDOMINAL EXPLORATION      CERVICAL DISK AND FUSION ANTERIOR      COLON RESECTION      FOOT SURGERY      HAND SURGERY      LUMBAR FUSION ANTERIOR      LUMPECTOMY      OTHER ABDOMINAL SURGERY      surgery for chrons disease    KS BREAST REDUCTION          Allergies  Demerol hcl [meperidine], Methotrexate, Morphine, Promethazine, Remicade [infliximab], Sudafed [pseudoephedrine], Azathioprine sodium, Carafate [sucralfate], Other drug, Sulfa drugs, Sulfasalazine, Gabapentin, Nitroglycerin, Prednisone, Amitriptyline, Ativan [lorazepam], Betamethasone, Fentanyl, Lyrica [pregabalin], Methadone, Potassium, and Tizanidine     Social History:   Social History     Socioeconomic History    Marital status:      Spouse name: Not on file    Number of children: Not on file    Years of education: Not on file    Highest education level: Associate degree: academic program   Occupational History    Not on file   Tobacco Use    Smoking status: Never    Smokeless tobacco: Never    Tobacco comments:     second hand smoke parents - smoked for only 1 year many years ago   Vaping Use    Vaping status: Every Day    Substances: THC   Substance and Sexual Activity    Alcohol use: Not Currently     Alcohol/week: 0.6 oz     Types: 1 Shots of liquor per week     Comment: gin and half a lime; tonic water    Drug use: Not Currently     Types: Marijuana, Inhaled     Comment: medical marijuana through bong/vape    Sexual activity: Not on file     Comment:    Other Topics Concern    Not on file   Social History Narrative    Not on file     Social Drivers of Health     Financial Resource Strain: Unknown (1/15/2023)    Overall Financial Resource Strain (CARDIA)     Difficulty of Paying Living Expenses: Patient declined   Food  Insecurity: No Food Insecurity (12/4/2024)    Hunger Vital Sign     Worried About Running Out of Food in the Last Year: Never true     Ran Out of Food in the Last Year: Never true   Transportation Needs: No Transportation Needs (12/4/2024)    PRAPARE - Transportation     Lack of Transportation (Medical): No     Lack of Transportation (Non-Medical): No   Physical Activity: Sufficiently Active (1/15/2023)    Exercise Vital Sign     Days of Exercise per Week: 7 days     Minutes of Exercise per Session: 60 min   Stress: No Stress Concern Present (11/4/2021)    [place] - Occupational Stress Questionnaire     Feeling of Stress : Not at all   Social Connections: Socially Integrated (1/15/2023)    Social Connection and Isolation Panel [NHANES]     Frequency of Communication with Friends and Family: Three times a week     Frequency of Social Gatherings with Friends and Family: Once a week     Attends [employer] Services: More than 4 times per year     Active Member of Clubs or Organizations: Yes     Attends Club or Organization Meetings: More than 4 times per year     Marital Status:    Intimate Partner Violence: Not At Risk (12/4/2024)    Humiliation, Afraid, Rape, and Kick questionnaire     Fear of Current or Ex-Partner: No     Emotionally Abused: No     Physically Abused: No     Sexually Abused: No   Housing Stability: Low Risk  (12/4/2024)    Housing Stability Vital Sign     Unable to Pay for Housing in the Last Year: No     Number of Times Moved in the Last Year: 0     Homeless in the Last Year: No        Family History:   Family History   Problem Relation Age of Onset    Heart Disease Mother         Angina, MI later in life    Lung Disease Mother         copd    Diabetes Father     Heart Disease Father     Diabetes Sister     Cancer Maternal Aunt 40        breast       Home Medications:  Home Medications    Medication Sig Taking? Last Dose Authorizing Provider   Ascorbic Acid  (VITAMIN C GUMMIE PO) Take 2 Tablets by mouth every day. Yes 12/3/2024 at  5:00 AM Physician Outpatient   Biotin-Vitamin C (HAIR SKIN NAILS GUMMIES PO) Take 2 Tablets by mouth every day. Yes 12/3/2024 at  5:00 AM Physician Outpatient   Ca Phosphate-Cholecalciferol (CALCIUM/VITAMIN D3 GUMMIES PO) Take 2 Tablets by mouth every day. Yes 12/3/2024 at  5:00 AM Physician Outpatient   POTASSIUM CITRATE PO Take 2 Tablets by mouth every day. Gummy Yes 12/3/2024 Morning Physician Outpatient   Multiple Vitamins-Minerals (MULTI-VITAMIN GUMMIES PO) Take 2 Tablets by mouth every day. Yes 12/3/2024 at  5:00 AM Physician Outpatient   Probiotic Product (PROBIOTIC GUMMIES PO) Take 2 Tablets by mouth every day. Yes 12/3/2024 Morning Physician Outpatient   cetirizine (ZYRTEC) 10 MG Tab Take 10 mg by mouth every day. Yes 12/3/2024 at  5:00 AM Physician Outpatient   guaiFENesin (MUCINEX PO) Take 1 Tablet by mouth every day. Yes 12/3/2024 at  5:00 AM Physician Outpatient   zolpidem (AMBIEN) 10 MG Tab Take 1 Tablet by mouth at bedtime as needed for Sleep for up to 90 days. Yes 12/2/2024 Bedtime Chase Charles D.O.   Azelaic Acid 15 % Gel Apply 1 Application topically every day. Apply's on face Yes 12/3/2024 at  5:00 AM Physician Outpatient   oxyCODONE immediate release (ROXICODONE) 10 MG immediate release tablet Take 1 Tablet by mouth every 6 hours as needed for Moderate Pain (refill #1 of #3) for up to 30 days.  Patient taking differently: Take 10 mg by mouth 3 times a day. Yes 12/3/2024 at  5:00 PM Chase Charles D.O.   amLODIPine (NORVASC) 2.5 MG Tab Take 1 Tablet by mouth every day. Yes 12/4/2024 at  5:00 AM Chase Charles D.O.   spironolactone (ALDACTONE) 25 MG Tab Take 1 Tablet by mouth every day. **LAST SEEN 12/2023 .  TO ENSURE FURTHER REFILLS, KEEP SCHEDULED FOLLOW UP VISIT 11/27/2024.** Yes 12/3/2024 at  5:00 AM Jessica Charles, A.P.R.N.   hydroxychloroquine (PLAQUENIL) 200 MG Tab Take 1 Tablet by mouth every  day. Yes 12/3/2024 at  5:00 AM Petar Lewis M.D.   ondansetron (ZOFRAN ODT) 4 MG TABLET DISPERSIBLE Take 1 Tablet by mouth every 6 hours as needed for Nausea/Vomiting. Yes 12/4/2024 at  5:00 AM Chase Charles D.O.   metoprolol tartrate (LOPRESSOR) 50 MG Tab Take 1 Tablet by mouth 2 times a day. Yes 12/3/2024 at  5:00 PM Chase Charles D.O.   prochlorperazine (COMPAZINE) 10 MG Tab Take 1 Tablet by mouth every 6 hours as needed for Nausea/Vomiting.  11/27/2024 Chase Charles D.O.   nystatin (MYCOSTATIN) 429802 UNIT/ML Suspension Take 5 mL by mouth 4 times a day.  New Rx Chase Charles D.O.   oxyCODONE immediate release (ROXICODONE) 10 MG immediate release tablet Take 1 Tablet by mouth every 6 hours as needed for Moderate Pain (Refill #3 of #3) for up to 30 days.   Chase Charles D.O.   oxyCODONE immediate release (ROXICODONE) 10 MG immediate release tablet Take 1 Tablet by mouth every four hours as needed for Moderate Pain (Refill #2 of #3) for up to 30 days.   Chase Charles D.O.   amoxicillin-clavulanate (AUGMENTIN) 875-125 MG Tab Take 1 Tablet by mouth 2 times a day.  Patient not taking: Reported on 12/4/2024  Not Taking Chase Charles D.O.   traZODone (DESYREL) 50 MG Tab TAKE 1 TABLET BY MOUTH EVERY EVENING  Patient not taking: Reported on 12/4/2024  Not Taking Chase Charles D.O.   cetirizine (ZYRTEC) 1 MG/ML Solution oral solution Take 10 mL by mouth every day.  Patient not taking: Reported on 12/4/2024  Not Taking Chase Charles D.O.   potassium chloride SA (KDUR) 20 MEQ Tab CR Take 1 Tablet by mouth 2 times a day.  Patient not taking: Reported on 12/4/2024  Not Taking Chase Charles D.O.   furosemide (LASIX) 20 MG Tab Take 1 Tablet by mouth every day.  Patient not taking: Reported on 12/4/2024  Not Taking Florencia Petty P.A.-C.   levalbuterol (XOPENEX HFA) 45 MCG/ACT inhaler Inhale 2 Puffs every four hours as needed for Shortness of Breath (As needed  for shortness of breath, cough, wheezing.).  Patient not taking: Reported on 12/4/2024  Not Taking Man Tinoco M.D.   naratriptan (AMERGE) 2.5 MG tablet Take 1 Tablet by mouth as needed for Migraine.  Patient not taking: Reported on 12/4/2024  Not Taking Chase Charles D.O.         Review of Systems:  General: No fevers, chills, unintentional, weight loss.  HEENT: No scleral icterus, gum bleed.  Positive odynophagia.  Cardiology: No chest pain, palpitations, orthopnea.  Respiratory: No dyspnea, cough, wheezing.  Gastrointestinal: Positive nausea, vomiting, increased ostomy output.  No abdominal pain.  Genitourinary: No hematuria, dysuria, urgency.  Neurologic: No changes in memory, confusion, gait instability.  Skin: No ecchymosis, jaundice, telangiectasia.    Physical Exam:  Vitals:    12/05/24 1802 12/05/24 1925 12/06/24 0400 12/06/24 0743   BP: 131/63 122/54 127/54 116/65   Pulse: 88 77 77 90   Resp: 18 20 18 18   Temp:  36.3 °C (97.4 °F) 36.5 °C (97.7 °F) 36.6 °C (97.9 °F)   TempSrc:  Temporal Temporal Temporal   SpO2: 95% 94% 98% 95%   Weight:       Height:         General: Thin female in no acute cardiopulmonary distress.  Head: Normocephalic.  EENT: Scleral anicterus. Mucosa moist.   Respiratory: Breath sounds present. Symmetrical rise of anterior thorax.  Cardiovascular: Normal S1, S2.  Gastrointestinal: Soft, nontender, nondistended. Ostomy in place. Bowel sounds present. No guarding.  Neurologic: Alert and oriented.  Skin: Warm and dry.      LABS:  Lab Results   Component Value Date/Time    SODIUM 141 12/06/2024 02:10 AM    POTASSIUM 4.3 12/06/2024 02:10 AM    CHLORIDE 109 12/06/2024 02:10 AM    CO2 24 12/06/2024 02:10 AM    GLUCOSE 111 (H) 12/06/2024 02:10 AM    BUN 43 (H) 12/06/2024 02:10 AM    CREATININE 1.40 12/06/2024 02:10 AM    CREATININE 0.89 02/10/2010 04:04 PM    BUNCREATRAT 17 02/10/2010 04:04 PM    GLOMRATE >59 02/10/2010 04:04 PM      Lab Results   Component Value Date/Time    WBC 6.5  12/06/2024 02:10 AM    WBC 7.9 02/10/2010 04:04 PM    RBC 3.24 (L) 12/06/2024 02:10 AM    RBC 4.86 02/10/2010 04:04 PM    HEMOGLOBIN 10.0 (L) 12/06/2024 02:10 AM    HEMATOCRIT 30.4 (L) 12/06/2024 02:10 AM    MCV 93.8 12/06/2024 02:10 AM    MCV 86 02/10/2010 04:04 PM    MCH 30.9 12/06/2024 02:10 AM    MCH 27.8 02/10/2010 04:04 PM    MCHC 32.9 12/06/2024 02:10 AM    MPV 10.8 12/06/2024 02:10 AM    NEUTSPOLYS 62.10 12/06/2024 02:10 AM    LYMPHOCYTES 24.00 12/06/2024 02:10 AM    MONOCYTES 10.80 12/06/2024 02:10 AM    EOSINOPHILS 2.20 12/06/2024 02:10 AM    EOSINOPHILS 34 01/28/2021 02:40 PM    BASOPHILS 0.60 12/06/2024 02:10 AM    HYPOCHROMIA 1+ 03/15/2014 10:02 AM        Lab Results   Component Value Date/Time    PROTHROMBTM 14.9 (H) 10/24/2022 01:19 AM    INR 1.19 (H) 10/24/2022 01:19 AM      Recent Labs     12/02/24  1146 12/04/24  1103 12/05/24  0149   ASTSGOT 42 43 30   ALTSGPT 27 35 26   TBILIRUBIN 0.9 0.6 0.3   GLOBULIN 3.5 3.5 2.5       IMAGING: Reviewed personally and summarized in HPI.        ASSESSMENT:   -Esophageal dysphagia secondary to type II achalasia  -Unintentional weight loss secondary to above  -History of Crohn's disease s/p total colectomy and ileostomy       PLAN:   Patient has worsening dysphagia to both liquids and solids secondary to type II achalasia.  She had esophageal dilation with TTS up to 20 mm in 6/2024 with mild improvement for 1 month.  However, her symptoms have progressed to gotten worse.  She was advised to see the [facility] for management of achalasia but never followed up.  Discussed possible treatment for achalasia in patient, including esophageal dilation and botulin toxin injection.  Patient reports that she would like to think about it and let me know in the morning.    EGD with possible esophageal dilation and possible botulin toxin injection is tentatively scheduled tomorrow at 11:30 AM.  Risks, benefits and alternatives of the procedure were discussed with the patient,  including perforation, bleeding, pain and anesthesia side effects.  She verbalized understanding.  All questions and concerns addressed.        Thank you for the consultation. Please call with any questions or concerns.    Monserrat Sandoval D.O.  Gastroenterology  [facility]  [phone]

## 2024-12-07 ENCOUNTER — ANESTHESIA (OUTPATIENT)
Dept: SURGERY | Facility: MEDICAL CENTER | Age: 69
DRG: 641 | End: 2024-12-07
Payer: MEDICARE

## 2024-12-07 ENCOUNTER — ANESTHESIA EVENT (OUTPATIENT)
Dept: SURGERY | Facility: MEDICAL CENTER | Age: 69
DRG: 641 | End: 2024-12-07
Payer: MEDICARE

## 2024-12-07 LAB
ALBUMIN SERPL BCP-MCNC: 3.6 G/DL (ref 3.2–4.9)
ALBUMIN/GLOB SERPL: 1.4 G/DL
ALP SERPL-CCNC: 77 U/L (ref 30–99)
ALT SERPL-CCNC: 28 U/L (ref 2–50)
ANION GAP SERPL CALC-SCNC: 15 MMOL/L (ref 7–16)
AST SERPL-CCNC: 29 U/L (ref 12–45)
BACTERIA UR CULT: NORMAL
BASOPHILS # BLD AUTO: 0.2 % (ref 0–1.8)
BASOPHILS # BLD: 0.04 K/UL (ref 0–0.12)
BILIRUB SERPL-MCNC: 0.6 MG/DL (ref 0.1–1.5)
BUN SERPL-MCNC: 21 MG/DL (ref 8–22)
CALCIUM ALBUM COR SERPL-MCNC: 8.7 MG/DL (ref 8.5–10.5)
CALCIUM SERPL-MCNC: 8.4 MG/DL (ref 8.4–10.2)
CHLORIDE SERPL-SCNC: 108 MMOL/L (ref 96–112)
CO2 SERPL-SCNC: 23 MMOL/L (ref 20–33)
CREAT SERPL-MCNC: 1.06 MG/DL (ref 0.5–1.4)
EOSINOPHIL # BLD AUTO: 0 K/UL (ref 0–0.51)
EOSINOPHIL NFR BLD: 0 % (ref 0–6.9)
ERYTHROCYTE [DISTWIDTH] IN BLOOD BY AUTOMATED COUNT: 43.1 FL (ref 35.9–50)
FERRITIN SERPL-MCNC: 271 NG/ML (ref 10–291)
GFR SERPLBLD CREATININE-BSD FMLA CKD-EPI: 56 ML/MIN/1.73 M 2
GLOBULIN SER CALC-MCNC: 2.5 G/DL (ref 1.9–3.5)
GLUCOSE SERPL-MCNC: 108 MG/DL (ref 65–99)
HCT VFR BLD AUTO: 34.3 % (ref 37–47)
HGB BLD-MCNC: 11.7 G/DL (ref 12–16)
IMM GRANULOCYTES # BLD AUTO: 0.06 K/UL (ref 0–0.11)
IMM GRANULOCYTES NFR BLD AUTO: 0.4 % (ref 0–0.9)
IRON SATN MFR SERPL: 9 % (ref 15–55)
IRON SERPL-MCNC: 27 UG/DL (ref 40–170)
LYMPHOCYTES # BLD AUTO: 0.69 K/UL (ref 1–4.8)
LYMPHOCYTES NFR BLD: 4.1 % (ref 22–41)
MCH RBC QN AUTO: 31.3 PG (ref 27–33)
MCHC RBC AUTO-ENTMCNC: 34.1 G/DL (ref 32.2–35.5)
MCV RBC AUTO: 91.7 FL (ref 81.4–97.8)
MONOCYTES # BLD AUTO: 0.92 K/UL (ref 0–0.85)
MONOCYTES NFR BLD AUTO: 5.5 % (ref 0–13.4)
NEUTROPHILS # BLD AUTO: 15.01 K/UL (ref 1.82–7.42)
NEUTROPHILS NFR BLD: 89.8 % (ref 44–72)
NRBC # BLD AUTO: 0 K/UL
NRBC BLD-RTO: 0 /100 WBC (ref 0–0.2)
PLATELET # BLD AUTO: 163 K/UL (ref 164–446)
PMV BLD AUTO: 10.8 FL (ref 9–12.9)
POTASSIUM SERPL-SCNC: 3.5 MMOL/L (ref 3.6–5.5)
PROT SERPL-MCNC: 6.1 G/DL (ref 6–8.2)
RBC # BLD AUTO: 3.74 M/UL (ref 4.2–5.4)
SIGNIFICANT IND 70042: NORMAL
SITE SITE: NORMAL
SODIUM SERPL-SCNC: 146 MMOL/L (ref 135–145)
SOURCE SOURCE: NORMAL
TIBC SERPL-MCNC: 289 UG/DL (ref 250–450)
UIBC SERPL-MCNC: 262 UG/DL (ref 110–370)
WBC # BLD AUTO: 16.7 K/UL (ref 4.8–10.8)

## 2024-12-07 PROCEDURE — 82728 ASSAY OF FERRITIN: CPT

## 2024-12-07 PROCEDURE — 85025 COMPLETE CBC W/AUTO DIFF WBC: CPT

## 2024-12-07 PROCEDURE — 94760 N-INVAS EAR/PLS OXIMETRY 1: CPT

## 2024-12-07 PROCEDURE — 83550 IRON BINDING TEST: CPT

## 2024-12-07 PROCEDURE — 51798 US URINE CAPACITY MEASURE: CPT

## 2024-12-07 PROCEDURE — 700111 HCHG RX REV CODE 636 W/ 250 OVERRIDE (IP): Performed by: STUDENT IN AN ORGANIZED HEALTH CARE EDUCATION/TRAINING PROGRAM

## 2024-12-07 PROCEDURE — 700105 HCHG RX REV CODE 258: Performed by: STUDENT IN AN ORGANIZED HEALTH CARE EDUCATION/TRAINING PROGRAM

## 2024-12-07 PROCEDURE — 700102 HCHG RX REV CODE 250 W/ 637 OVERRIDE(OP): Performed by: HOSPITALIST

## 2024-12-07 PROCEDURE — 96367 TX/PROPH/DG ADDL SEQ IV INF: CPT

## 2024-12-07 PROCEDURE — 700111 HCHG RX REV CODE 636 W/ 250 OVERRIDE (IP): Mod: JZ | Performed by: INTERNAL MEDICINE

## 2024-12-07 PROCEDURE — 700111 HCHG RX REV CODE 636 W/ 250 OVERRIDE (IP): Mod: JZ | Performed by: STUDENT IN AN ORGANIZED HEALTH CARE EDUCATION/TRAINING PROGRAM

## 2024-12-07 PROCEDURE — 80053 COMPREHEN METABOLIC PANEL: CPT

## 2024-12-07 PROCEDURE — 96372 THER/PROPH/DIAG INJ SC/IM: CPT

## 2024-12-07 PROCEDURE — 160035 HCHG PACU - 1ST 60 MINS PHASE I: Performed by: INTERNAL MEDICINE

## 2024-12-07 PROCEDURE — 160048 HCHG OR STATISTICAL LEVEL 1-5: Performed by: INTERNAL MEDICINE

## 2024-12-07 PROCEDURE — 36415 COLL VENOUS BLD VENIPUNCTURE: CPT

## 2024-12-07 PROCEDURE — 160002 HCHG RECOVERY MINUTES (STAT): Performed by: INTERNAL MEDICINE

## 2024-12-07 PROCEDURE — C1889 IMPLANT/INSERT DEVICE, NOC: HCPCS | Performed by: INTERNAL MEDICINE

## 2024-12-07 PROCEDURE — 770020 HCHG ROOM/CARE - TELE (206)

## 2024-12-07 PROCEDURE — 96376 TX/PRO/DX INJ SAME DRUG ADON: CPT

## 2024-12-07 PROCEDURE — 99233 SBSQ HOSP IP/OBS HIGH 50: CPT | Performed by: STUDENT IN AN ORGANIZED HEALTH CARE EDUCATION/TRAINING PROGRAM

## 2024-12-07 PROCEDURE — 3E0G8GC INTRODUCTION OF OTHER THERAPEUTIC SUBSTANCE INTO UPPER GI, VIA NATURAL OR ARTIFICIAL OPENING ENDOSCOPIC: ICD-10-PCS | Performed by: INTERNAL MEDICINE

## 2024-12-07 PROCEDURE — 83540 ASSAY OF IRON: CPT

## 2024-12-07 PROCEDURE — A9270 NON-COVERED ITEM OR SERVICE: HCPCS | Performed by: HOSPITALIST

## 2024-12-07 PROCEDURE — 160009 HCHG ANES TIME/MIN: Performed by: INTERNAL MEDICINE

## 2024-12-07 PROCEDURE — 700102 HCHG RX REV CODE 250 W/ 637 OVERRIDE(OP): Performed by: STUDENT IN AN ORGANIZED HEALTH CARE EDUCATION/TRAINING PROGRAM

## 2024-12-07 PROCEDURE — 700101 HCHG RX REV CODE 250: Performed by: STUDENT IN AN ORGANIZED HEALTH CARE EDUCATION/TRAINING PROGRAM

## 2024-12-07 PROCEDURE — 160202 HCHG ENDO MINUTES - 1ST 30 MINS LEVEL 3: Performed by: INTERNAL MEDICINE

## 2024-12-07 PROCEDURE — 0DJ08ZZ INSPECTION OF UPPER INTESTINAL TRACT, VIA NATURAL OR ARTIFICIAL OPENING ENDOSCOPIC: ICD-10-PCS | Performed by: INTERNAL MEDICINE

## 2024-12-07 PROCEDURE — A9270 NON-COVERED ITEM OR SERVICE: HCPCS | Performed by: STUDENT IN AN ORGANIZED HEALTH CARE EDUCATION/TRAINING PROGRAM

## 2024-12-07 PROCEDURE — 700111 HCHG RX REV CODE 636 W/ 250 OVERRIDE (IP): Performed by: HOSPITALIST

## 2024-12-07 RX ORDER — ONDANSETRON 2 MG/ML
4 INJECTION INTRAMUSCULAR; INTRAVENOUS
Status: DISCONTINUED | OUTPATIENT
Start: 2024-12-07 | End: 2024-12-07 | Stop reason: HOSPADM

## 2024-12-07 RX ORDER — DIPHENHYDRAMINE HYDROCHLORIDE 50 MG/ML
12.5 INJECTION INTRAMUSCULAR; INTRAVENOUS
Status: DISCONTINUED | OUTPATIENT
Start: 2024-12-07 | End: 2024-12-07 | Stop reason: HOSPADM

## 2024-12-07 RX ORDER — ALBUTEROL SULFATE 5 MG/ML
2.5 SOLUTION RESPIRATORY (INHALATION)
Status: DISCONTINUED | OUTPATIENT
Start: 2024-12-07 | End: 2024-12-07 | Stop reason: HOSPADM

## 2024-12-07 RX ORDER — OXYCODONE HCL 5 MG/5 ML
5 SOLUTION, ORAL ORAL
Status: DISCONTINUED | OUTPATIENT
Start: 2024-12-07 | End: 2024-12-07 | Stop reason: HOSPADM

## 2024-12-07 RX ORDER — HYDROMORPHONE HYDROCHLORIDE 1 MG/ML
0.1 INJECTION, SOLUTION INTRAMUSCULAR; INTRAVENOUS; SUBCUTANEOUS
Status: DISCONTINUED | OUTPATIENT
Start: 2024-12-07 | End: 2024-12-07 | Stop reason: HOSPADM

## 2024-12-07 RX ORDER — SODIUM CHLORIDE, SODIUM LACTATE, POTASSIUM CHLORIDE, CALCIUM CHLORIDE 600; 310; 30; 20 MG/100ML; MG/100ML; MG/100ML; MG/100ML
INJECTION, SOLUTION INTRAVENOUS CONTINUOUS
Status: DISCONTINUED | OUTPATIENT
Start: 2024-12-07 | End: 2024-12-07

## 2024-12-07 RX ORDER — OXYCODONE HCL 5 MG/5 ML
10 SOLUTION, ORAL ORAL
Status: DISCONTINUED | OUTPATIENT
Start: 2024-12-07 | End: 2024-12-07 | Stop reason: HOSPADM

## 2024-12-07 RX ORDER — LABETALOL HYDROCHLORIDE 5 MG/ML
5 INJECTION, SOLUTION INTRAVENOUS
Status: DISCONTINUED | OUTPATIENT
Start: 2024-12-07 | End: 2024-12-07 | Stop reason: HOSPADM

## 2024-12-07 RX ORDER — SODIUM CHLORIDE, SODIUM LACTATE, POTASSIUM CHLORIDE, CALCIUM CHLORIDE 600; 310; 30; 20 MG/100ML; MG/100ML; MG/100ML; MG/100ML
INJECTION, SOLUTION INTRAVENOUS CONTINUOUS
Status: DISCONTINUED | OUTPATIENT
Start: 2024-12-07 | End: 2024-12-07 | Stop reason: HOSPADM

## 2024-12-07 RX ORDER — SUCCINYLCHOLINE CHLORIDE 20 MG/ML
INJECTION INTRAMUSCULAR; INTRAVENOUS PRN
Status: DISCONTINUED | OUTPATIENT
Start: 2024-12-07 | End: 2024-12-07 | Stop reason: SURG

## 2024-12-07 RX ORDER — LIDOCAINE HYDROCHLORIDE 20 MG/ML
INJECTION, SOLUTION EPIDURAL; INFILTRATION; INTRACAUDAL; PERINEURAL PRN
Status: DISCONTINUED | OUTPATIENT
Start: 2024-12-07 | End: 2024-12-07 | Stop reason: SURG

## 2024-12-07 RX ORDER — EPHEDRINE SULFATE 50 MG/ML
5 INJECTION, SOLUTION INTRAVENOUS
Status: DISCONTINUED | OUTPATIENT
Start: 2024-12-07 | End: 2024-12-07 | Stop reason: HOSPADM

## 2024-12-07 RX ORDER — HYDRALAZINE HYDROCHLORIDE 20 MG/ML
5 INJECTION INTRAMUSCULAR; INTRAVENOUS
Status: DISCONTINUED | OUTPATIENT
Start: 2024-12-07 | End: 2024-12-07 | Stop reason: HOSPADM

## 2024-12-07 RX ORDER — HYDROMORPHONE HYDROCHLORIDE 1 MG/ML
0.2 INJECTION, SOLUTION INTRAMUSCULAR; INTRAVENOUS; SUBCUTANEOUS
Status: DISCONTINUED | OUTPATIENT
Start: 2024-12-07 | End: 2024-12-07 | Stop reason: HOSPADM

## 2024-12-07 RX ORDER — SODIUM CHLORIDE, SODIUM LACTATE, POTASSIUM CHLORIDE, CALCIUM CHLORIDE 600; 310; 30; 20 MG/100ML; MG/100ML; MG/100ML; MG/100ML
INJECTION, SOLUTION INTRAVENOUS
Status: DISCONTINUED | OUTPATIENT
Start: 2024-12-07 | End: 2024-12-07 | Stop reason: SURG

## 2024-12-07 RX ORDER — DEXAMETHASONE SODIUM PHOSPHATE 4 MG/ML
INJECTION, SOLUTION INTRA-ARTICULAR; INTRALESIONAL; INTRAMUSCULAR; INTRAVENOUS; SOFT TISSUE PRN
Status: DISCONTINUED | OUTPATIENT
Start: 2024-12-07 | End: 2024-12-07 | Stop reason: SURG

## 2024-12-07 RX ORDER — HYDROMORPHONE HYDROCHLORIDE 1 MG/ML
0.4 INJECTION, SOLUTION INTRAMUSCULAR; INTRAVENOUS; SUBCUTANEOUS
Status: DISCONTINUED | OUTPATIENT
Start: 2024-12-07 | End: 2024-12-07 | Stop reason: HOSPADM

## 2024-12-07 RX ORDER — HALOPERIDOL 5 MG/ML
1 INJECTION INTRAMUSCULAR
Status: DISCONTINUED | OUTPATIENT
Start: 2024-12-07 | End: 2024-12-07 | Stop reason: HOSPADM

## 2024-12-07 RX ADMIN — ZOLPIDEM TARTRATE 10 MG: 5 TABLET ORAL at 21:43

## 2024-12-07 RX ADMIN — NYSTATIN 500000 UNITS: 100000 SUSPENSION ORAL at 17:37

## 2024-12-07 RX ADMIN — HEPARIN SODIUM 5000 UNITS: 5000 INJECTION, SOLUTION INTRAVENOUS; SUBCUTANEOUS at 06:20

## 2024-12-07 RX ADMIN — NYSTATIN 500000 UNITS: 100000 SUSPENSION ORAL at 20:58

## 2024-12-07 RX ADMIN — METOPROLOL TARTRATE 50 MG: 50 TABLET, FILM COATED ORAL at 06:19

## 2024-12-07 RX ADMIN — SUCCINYLCHOLINE CHLORIDE 60 MG: 20 INJECTION, SOLUTION INTRAMUSCULAR; INTRAVENOUS at 12:37

## 2024-12-07 RX ADMIN — LIDOCAINE HYDROCHLORIDE 60 MG: 20 INJECTION, SOLUTION EPIDURAL; INFILTRATION; INTRACAUDAL; PERINEURAL at 12:37

## 2024-12-07 RX ADMIN — DIAZEPAM 5 MG: 5 TABLET ORAL at 15:24

## 2024-12-07 RX ADMIN — DEXTROMETHORPHAN 60 MG: 30 SUSPENSION, EXTENDED RELEASE ORAL at 06:19

## 2024-12-07 RX ADMIN — GUAIFENESIN 600 MG: 600 TABLET, EXTENDED RELEASE ORAL at 17:37

## 2024-12-07 RX ADMIN — DEXTROMETHORPHAN 60 MG: 30 SUSPENSION, EXTENDED RELEASE ORAL at 17:37

## 2024-12-07 RX ADMIN — GUAIFENESIN 600 MG: 600 TABLET, EXTENDED RELEASE ORAL at 06:19

## 2024-12-07 RX ADMIN — OXYCODONE HYDROCHLORIDE 10 MG: 10 TABLET ORAL at 15:24

## 2024-12-07 RX ADMIN — SODIUM CHLORIDE, POTASSIUM CHLORIDE, SODIUM LACTATE AND CALCIUM CHLORIDE: 600; 310; 30; 20 INJECTION, SOLUTION INTRAVENOUS at 12:32

## 2024-12-07 RX ADMIN — PROPOFOL 100 MG: 10 INJECTION, EMULSION INTRAVENOUS at 12:37

## 2024-12-07 RX ADMIN — ONDANSETRON 4 MG: 2 INJECTION INTRAMUSCULAR; INTRAVENOUS at 15:23

## 2024-12-07 RX ADMIN — AMLODIPINE BESYLATE 2.5 MG: 5 TABLET ORAL at 06:19

## 2024-12-07 RX ADMIN — METOPROLOL TARTRATE 50 MG: 50 TABLET, FILM COATED ORAL at 17:37

## 2024-12-07 RX ADMIN — SODIUM CHLORIDE 250 MG: 9 INJECTION, SOLUTION INTRAVENOUS at 17:33

## 2024-12-07 RX ADMIN — CETIRIZINE HYDROCHLORIDE 10 MG: 10 TABLET, FILM COATED ORAL at 06:19

## 2024-12-07 RX ADMIN — HEPARIN SODIUM 5000 UNITS: 5000 INJECTION, SOLUTION INTRAVENOUS; SUBCUTANEOUS at 20:58

## 2024-12-07 RX ADMIN — DEXAMETHASONE SODIUM PHOSPHATE 4 MG: 4 INJECTION INTRA-ARTICULAR; INTRALESIONAL; INTRAMUSCULAR; INTRAVENOUS; SOFT TISSUE at 12:43

## 2024-12-07 RX ADMIN — DIAZEPAM 5 MG: 5 TABLET ORAL at 20:58

## 2024-12-07 ASSESSMENT — FIBROSIS 4 INDEX: FIB4 SCORE: 2.8

## 2024-12-07 ASSESSMENT — PAIN DESCRIPTION - PAIN TYPE
TYPE: ACUTE PAIN

## 2024-12-07 NOTE — PROCEDURES
.Procedure Performed: Esophagogastroduodenoscopy (EGD) with Botox injection of the GE junction     Indication for Procedure: Dysphagia, achalasia     Procedure Date: 12/7/2024    Performing Physician: Stuart Payan D.O.    Informed Consent: Before the procedure was performed, the risks, benefits, and complications of the procedure were explained in layman's terms to the patient and understood by the patient. The risks included, but were not limited to the risks of anesthesia/sedation, bleeding, pneumothorax, perforation, adverse reaction to medication, acute respiratory failure and possible death. The patient fully understood the risks and benefits and requested the procedure be performed.       Consent Obtained: From the patient.            Anesthesia: Please see anesthesia records.     Findings:   1. Esophagus: No appreciable peristalsis.  Small amount of residual liquid, suction.  The lower esophageal sphincter mildly hypertensive but easily transversed with the scope.  Findings consistent with achalasia.  A total of 100 units of Botox was injected at the GE junction in 4 quadrants, minimal blood loss  2. Stomach: Small sliding hiatal hernia.  Otherwise visualized portion of the common appeared normal. The pylorus is patent.  3. Duodenum: Visualized portions of the 1st and 2nd duodenum appeared normal.    Procedure Summary: Timeout was performed. The patient was placed in the left lateral decubitus position. An Olympus adult gastroscope was gently passed into the oropharynx and esophagus and advanced into the 2nd part of duodenum. The scope was then drawn back into the stomach. Retroflexion was done to visualize the fundus and cardia of the stomach. The gastroscope was then withdrawn while carefully inspecting the esophageal mucosa. Continuous monitoring of pulse oximetry, heart rhythm and blood pressure were done before, during and after the procedure. The patient left the endoscopy lab in stable  condition.    Estimated Blood Loss, if any: Minimal     Unusual Events or Complications: None     Specimen(s), if any: None     Disposition:     -Puréed diet, advance to GI soft as tolerated  -Dysphagia precaution  -Monitor clinically.  May need to repeat Botox injection in 3-6 months based on clinical progression  -For further management of achalasia and Crohn disease follow-up in the GI clinic after discharge.    .Stuart Payan D.O.  Gastroenterology  [facility]  [phone]

## 2024-12-07 NOTE — ANESTHESIA PREPROCEDURE EVALUATION
Case: [id number] Date/Time: 12/07/24 1130    Procedure: GASTROSCOPY    Location:  ENDOSCOPIC ULTRASOUND ROOM / SURGERY [facility]    Surgeons: Stuart Payan D.O.            Relevant Problems   ANESTHESIA   (positive) Sleep apnea      PULMONARY   (positive) COPD (chronic obstructive pulmonary disease) (HCC)      NEURO   (positive) Migraine      CARDIAC   (positive) Atrial fibrillation ([facility])   (positive) Chronic diastolic congestive heart failure ([facility])   (positive) Essential hypertension   (positive) Migraine      GI   (positive) GERD (gastroesophageal reflux disease)         (positive) FLORES (acute kidney injury) ([facility])   (positive) Chronic kidney disease, stage 3b      ENDO (within normal limits)      Other   (positive) Inflammatory arthritis       Physical Exam    Airway   Mallampati: II  TM distance: >3 FB  Neck ROM: full       Cardiovascular - normal exam  Rhythm: regular  Rate: normal  (-) murmur     Dental - normal exam           Pulmonary - normal exam  Breath sounds clear to auscultation     Abdominal    Neurological - normal exam                   Anesthesia Plan    ASA 3       Plan - general       Airway plan will be ETT          Induction: intravenous    Postoperative Plan: Postoperative administration of opioids is intended.    Pertinent diagnostic labs and testing reviewed    Informed Consent:    Anesthetic plan and risks discussed with patient.    Use of blood products discussed with: patient whom consented to blood products.

## 2024-12-07 NOTE — PROGRESS NOTES
Patient related she is feeling as though she cannot completely empty her bladder and feels like she has to push the urine out. Patient related she does not have burning when she urinates.Patient states she's had this feeling for the last couple days. Patient stated she woke up this morning due to having the urge to urinate. Patient urinated some, but felt she still had a full bladder. A bladder scan was completed and had a result of 431 mL.     Dr Vargas was notified. Per Dr. Vargas have patient ambulate and try to empty her bladder again. Bladder scan after urination. If patient retains over 300 mLs a straight cath may be needed.

## 2024-12-07 NOTE — PROGRESS NOTES
[facility] Medicine Daily Progress Note    Date of Service  12/6/2024    Chief Complaint  Jana Overton is a 69 y.o. female admitted 12/4/2024 with N/V.    Hospital Course  Jana Overton is a 69 y.o. 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  Patient denies noticing fevers or chills.  She denies noticing any blood in her vomitus or her ostomy output.  Patient reports that her vomiting is so severe that is not able to tolerate anything orally including medications.     Interval Problem Update  12/5  Afebrile, normal HR/RR, -150 SpO2 94-98% on room air.  Hgb from 14-11.2, no bleeding, I suspect dilutional as all cell lines dropped, she is quite frail and was given IVF.  Renal function improving 2.50-2.02, Mag 1.6, UA not infectious.  She has long history of IBD, checked inflammatory markers and not elevated. Still with abdominal pain, has had difficulty with PO intake.   Replaced magnesium, ordered supplements, GI soft diet ordered after discussion with patient, IVF ordered for Hemal and decreased PO intake.    12/6 Had a lot of vomiting this morning with attempts at PO intake. Feels like it is getting stuck and she may need a dilation again. I consulted Dr. Sandoval and she will evaluate patient.   Replaced sodium, restarted home zyrtec and mucinex at her request, delsym ordered as well as lidocaine patch.  Continue to watch PO intake, renal function (which has significantly improved), electrolytes. She is up walking around the unit without difficulty.      I have discussed this patient's plan of care and discharge plan at IDT rounds today with Case Management, Nursing, Nursing leadership, and other members of the IDT team.    Consultants/Specialty  NA    Code Status  Full Code    Disposition  Medically Cleared  I have placed the appropriate orders for  post-discharge needs.    Review of Systems  ROS   Review of Systems   Constitutional:  Positive for malaise/fatigue. Negative for chills and fever.   Eyes:  Negative for discharge and redness.   Respiratory:  Negative for cough, shortness of breath and stridor.    Cardiovascular:  Negative for chest pain and leg swelling.   Gastrointestinal:  Positive for diarrhea, nausea and vomiting.        Increase watery output from the ostomy site   Genitourinary:  Negative for flank pain.   Musculoskeletal:  Negative for myalgias.   Skin: Negative.    Neurological:  Negative for focal weakness.   Endo/Heme/Allergies:  Bruises/bleeds easily.   Psychiatric/Behavioral:  The patient is not nervous/anxious.    Physical Exam  Temp:  [36.4 °C (97.6 °F)-36.6 °C (97.9 °F)] 36.4 °C (97.6 °F)  Pulse:  [73-90] 73  Resp:  [18] 18  BP: (105-131)/(52-65) 105/52  SpO2:  [94 %-95 %] 94 %    Physical Exam  Vitals and nursing note reviewed.   Constitutional:       General: She is not in acute distress.     Appearance: She is not toxic-appearing.   HENT:      Head: Normocephalic and atraumatic.      Nose: Nose normal. No rhinorrhea.      Mouth/Throat:      Mouth: Mucous membranes are dry.      Pharynx: Oropharynx is clear.   Eyes:      General: No scleral icterus.     Extraocular Movements: Extraocular movements intact.      Conjunctiva/sclera: Conjunctivae normal.   Cardiovascular:      Rate and Rhythm: Normal rate and regular rhythm.      Pulses: Normal pulses.   Pulmonary:      Effort: No respiratory distress.      Breath sounds: No wheezing, rhonchi or rales.   Abdominal:      General: There is no distension.      Palpations: Abdomen is soft.      Tenderness: There is abdominal tenderness (moderate generalized, ostomy noted, no apparent surrounding infection, no bleeding). There is no guarding or rebound.      Comments: Multiple abdominal surgical scars   Musculoskeletal:         General: Normal range of motion.      Cervical back: Normal  range of motion and neck supple. No rigidity.      Right lower leg: No edema.      Left lower leg: No edema.   Skin:     General: Skin is warm and dry.      Capillary Refill: Capillary refill takes less than 2 seconds.      Coloration: Skin is not jaundiced.   Neurological:      General: No focal deficit present.      Mental Status: She is alert and oriented to person, place, and time. Mental status is at baseline.      Cranial Nerves: No cranial nerve deficit.      Motor: No weakness.      Gait: Gait normal.   Psychiatric:         Mood and Affect: Mood normal.         Behavior: Behavior normal.         Thought Content: Thought content normal.         Judgment: Judgment normal.         Fluids    Intake/Output Summary (Last 24 hours) at 12/7/2024 0558  Last data filed at 12/6/2024 2100  Gross per 24 hour   Intake 992.61 ml   Output --   Net 992.61 ml        Laboratory  Recent Labs     12/04/24  1103 12/05/24  0149 12/06/24  0210   WBC 9.8 7.5 6.5   RBC 4.51 3.53* 3.24*   HEMOGLOBIN 14.0 11.2* 10.0*   HEMATOCRIT 41.4 32.8* 30.4*   MCV 91.8 92.9 93.8   MCH 31.0 31.7 30.9   MCHC 33.8 34.1 32.9   RDW 41.8 42.8 44.0   PLATELETCT 238 169 149*   MPV 10.4 10.7 10.8     Recent Labs     12/04/24  1103 12/05/24  0149 12/06/24  0210   SODIUM 139 139 141   POTASSIUM 4.6 4.3 4.3   CHLORIDE 106 107 109   CO2 17* 22 24   GLUCOSE 98 105* 111*   BUN 57* 60* 43*   CREATININE 2.50* 2.02* 1.40   CALCIUM 10.2 9.1 8.5                   Imaging  DX-CHEST-PORTABLE (1 VIEW)   Final Result      Linear atelectasis within the left lung.      CT-ABDOMEN-PELVIS W/O   Final Result      1.  Prior colectomy with left lower quadrant ostomy site.      2.  No evidence of bowel obstruction or focal inflammatory change.      3.  Linear atelectasis versus scarring within the left lung base.           Assessment/Plan  * FLORES (acute kidney injury) (HCC)- (present on admission)  Assessment & Plan  Mostly due to dehydration   I will start intravenous  fluids  Avoid / minimize nephrotoxins as much as possible.  Monitor inputs and outputs  I will hold home furosemide for now, given her acute kidney injury and dehydration     Easy bruising- (present on admission)  Assessment & Plan  Platelet count appear within normal limits.  Hemoglobin appears stable.  I will monitor hemoglobin and platelet count with daily labs.     ACP (advance care planning)- (present on admission)  Assessment & Plan  I had a discussion with the patient regarding goals of care, diagnoses, prognosis, and CODE STATUS.  We discussed her diagnoses, prognosis and comorbidities. The patient has advanced age of 69 years.  She has a number of chronic medical problems including chronic disease, chronic heart failure, and is presenting with intractable nausea and vomiting with severe dehydration.  However the patient enjoys a very good cognitive and functional capacity.  She hopes to live another 30 years.  At this point the patient wants to maintain a full code.  She is open to all forms of invasive or noninvasive diagnostic and therapeutic interventions.      Intractable nausea and vomiting- (present on admission)  Assessment & Plan  We will check CT abdomen pelvis for further evaluation.  Bowel rest with supportive care and a modified diet  Intravenous fluids.  I will check gastrointestinal PCR panel  CT abdomen shows prior colectomy with left lower quadrant ostomy site. No evidence of bowel obstruction or focal inflammatory change.   Antiemetics as needed    Increased ostomy output- (present on admission)  Assessment & Plan  We will check CT abdomen pelvis for further evaluation.  Bowel rest with supportive care and a modified diet  Intravenous fluids.  I will check gastrointestinal PCR panel  CT abdomen shows prior colectomy with left lower quadrant ostomy site. No evidence of bowel obstruction or focal inflammatory change.     Metabolic acidosis- (present on admission)  Assessment & Plan  Likely  due to reduced intravascular volume and increase bicarb losses through increased ostomy output  I will start intravenous fluids and bicarb  Anticipate improvement with intravenous fluids  Continue to monitor, I will order follow-up bicarb level         Chronic diastolic congestive heart failure (HCC)- (present on admission)  Assessment & Plan  Not currently in exacerbation but is at risk for so.  I will start amlodipine and metoprolol with hold parameters  I will hold home furosemide for now, given her acute kidney injury and dehydration      Essential hypertension- (present on admission)  Assessment & Plan  I will start amlodipine and metoprolol with hold parameters  I will hold home furosemide for now, given her acute kidney injury and dehydration    Dehydration- (present on admission)  Assessment & Plan  Likely secondary to reduced oral intake, and increase in ostomy output  I will hold home furosemide for now   Encourage oral intake as tolerated, antiemetics as needed.  Intravenous hydration until adequate oral intake is achieved     Atrial fibrillation (HCC)- (present on admission)  Assessment & Plan  Does not tolerate anticoagulation due to easy bleeding  I will resume home metoprolol with hold parameters    GERD (gastroesophageal reflux disease)- (present on admission)  Assessment & Plan  I will start Tums as needed     Crohn's disease of small intestine with complication (HCC)- (present on admission)  Assessment & Plan  Status post colectomy.  Now has ileostomy          VTE prophylaxis:   SCDs/TEDs   heparin ppx      I have performed a physical exam and reviewed and updated ROS and Plan today (12/6/2024). In review of yesterday's note (12/5/2024), there are no changes except as documented above.  Total time spent 54 minutes. I spent greater than 50% of the time for patient care, counseling, and coordination on this date, including unit/floor time, and face-to-face time with the patient as per interval  events, my own review of patient's imaging and lab analysis and developing my assessment and plan above.

## 2024-12-07 NOTE — ANESTHESIA PROCEDURE NOTES
Airway    Date/Time: 12/7/2024 12:39 PM    Performed by: Kala Cassidy D.O.  Authorized by: Kala Cassidy D.O.    Location:  OR  Urgency:  Elective  Difficult Airway: No    Indications for Airway Management:  Anesthesia      Spontaneous Ventilation: absent    Sedation Level:  Deep  Preoxygenated: Yes    Patient Position:  Sniffing  Mask Difficulty Assessment:  0 - not attempted  Final Airway Type:  Endotracheal airway  Final Endotracheal Airway:  ETT  Cuffed: Yes    Technique Used for Successful ETT Placement:  Direct laryngoscopy    Insertion Site:  Oral  Blade Type:  Rosa  Laryngoscope Blade/Videolaryngoscope Blade Size:  3  ETT Size (mm):  6.5  Measured from:  Teeth  ETT to Teeth (cm):  19  Placement Verified by: auscultation and capnometry    Cormack-Lehane Classification:  Grade IIa - partial view of glottis  Number of Attempts at Approach:  1

## 2024-12-07 NOTE — PROGRESS NOTES
"Bladder scan performed post-void. MD notified, straight cath requested. Straight cath performed with RN and charge at bedside. Patient tolerated it well. Output documented. Patient reported, \"I feel much better.\"       "

## 2024-12-07 NOTE — CARE PLAN
The patient is Stable - Low risk of patient condition declining or worsening    Shift Goals  Clinical Goals: Monitor labs and vitals; EGF; safety; pain management  Patient Goals: rest and comfort  Family Goals: DALTON    Progress made toward(s) clinical / shift goals: Patient NPO since midnight, per night shift RN. Patient had a bladder scan and a straight cath to assist with urination. Patient managed pain with pharmacological and non-pharmacological methods. Patient utilized distraction to calm anxiety and walked the halls. EGD performed.       Problem: Pain - Standard  Goal: Alleviation of pain or a reduction in pain to the patient’s comfort goal  Outcome: Progressing  Note: Patient verbalizes pain using 0-10 scale. Patient uses pharmacological and non-pharmacological methods of pain management.       Problem: Knowledge Deficit - Standard  Goal: Patient and family/care givers will demonstrate understanding of plan of care, disease process/condition, diagnostic tests and medications  Outcome: Progressing  Note: Patient verbalizes understanding of plan of care and barriers to discharge. Patient asks appropriate questions about plan of care.         Patient is not progressing towards the following goals:

## 2024-12-07 NOTE — CARE PLAN
The patient is Stable - Low risk of patient condition declining or worsening    Shift Goals  Clinical Goals: Manage N/V, pain management, monitor labs  Patient Goals: manage N/V and pain, EGD, go home  Family Goals: DALTON    Progress made toward(s) clinical / shift goals:    Problem: Pain - Standard  Goal: Alleviation of pain or a reduction in pain to the patient’s comfort goal  Description: Target End Date:  Prior to discharge or change in level of care    Document on Vitals flowsheet    1.  Document pain using the appropriate pain scale per order or unit policy  2.  Educate and implement non-pharmacologic comfort measures (i.e. relaxation, distraction, massage, cold/heat therapy, etc.)  3.  Pain management medications as ordered  4.  Reassess pain after pain med administration per policy  5.  If opiods administered assess patient's response to pain medication is appropriate per POSS sedation scale  6.  Follow pain management plan developed in collaboration with patient and interdisciplinary team (including palliative care or pain specialists if applicable)  12/7/2024 0207 by Chanel Leung RDANIELLE.  Outcome: Progressing  Note: Patient did not require pain medication this shift.  12/7/2024 0204 by Chanel Leung R.N.  Outcome: Progressing  Note: Patient did not require pain medication this shift.      Problem: Knowledge Deficit - Standard  Goal: Patient and family/care givers will demonstrate understanding of plan of care, disease process/condition, diagnostic tests and medications  Description: Target End Date:  1-3 days or as soon as patient condition allows    Document in Patient Education    1.  Patient and family/caregiver oriented to unit, equipment, visitation policy and means for communicating concern  2.  Complete/review Learning Assessment  3.  Assess knowledge level of disease process/condition, treatment plan, diagnostic tests and medications  4.  Explain disease process/condition, treatment plan,  diagnostic tests and medications  12/7/2024 0207 by Chanel Leung R.N.  Outcome: Progressing  Note: Plan of care discussed with patient. Patient understands the plan of care.  12/7/2024 0204 by Chanel Leung R.N.  Outcome: Progressing  Note: Plan of care discussed with patient, Patient understands the plan of care.     Problem: Fall Risk  Goal: Patient will remain free from falls  Description: Target End Date:  Prior to discharge or change in level of care    Document interventions on the Ackerman Segundo Fall Risk Assessment    1.  Assess for fall risk factors  2.  Implement fall precautions  12/7/2024 0207 by Chanel Leung R.N.  Outcome: Progressing  Note: Patient remains free of falls this shift. Patient is steady on her feet.   12/7/2024 0204 by Chanel Leung R.N.  Outcome: Progressing  Note: Patient is steady on her feet. Patient remains free of falls this shift.        Patient is not progressing towards the following goals:

## 2024-12-07 NOTE — PROGRESS NOTES
0959: RN gave report to Pre-op RN.  1038: Patient transported to EGD via wheelchair and transport team.   1420: Patient returned to the unit via wheelchair and patient complained of abdominal pressure, bladder scan performed, and milton catheter placed with MD orders.   Resumed post-op vitals

## 2024-12-07 NOTE — OR NURSING
1256 To PACU from OR by a evensrmarco; pt sleeping, respirations spontaneous and nonlabored.     1311 pt awake, denies any pain or discomfort, VSS. Pt given apple juice, tolerated well.    1326  VSS, no c/o pain or discomfort. Pt meets criteria to return to room; report called to receiving RN.     1330 transport requested in [facility].     1400 pt transported back to room via w/c

## 2024-12-07 NOTE — PROGRESS NOTES
"Patient returned from EGD and reporting \"I really have to go to the bathroom.\" Patient reported feeling discomfort in abdomen and reported not being able to urinate very much. Bladder scan performed. Per MD order, Milton catheter was placed. Charge nurse assisted RN to place milton catheter. Output was noted and education provided to patient. Patient tolerated procedure well.   "

## 2024-12-07 NOTE — PROGRESS NOTES
4 Eyes Skin Assessment Completed by Mary Lou Peoples, RN and Keith Lucia RN.    Head redness, rash, rosacea  Ears WDL  Nose WDL  Mouth Redness, thrush  Neck WDL  Breast/Chest WDL  Shoulder Blades WDL  Spine WDL  (R) Arm/Elbow/Hand Bruising, rosacea  (L) Arm/Elbow/Hand Bruising, rosacea  Abdomen ileostomy site, swelling  Groin Redness, Blanching, and Swelling  Scrotum/Coccyx/Buttocks Blanching  (R) Leg Scar, Bruising, Swelling, and Edema; varicose veins  (L) Leg Bruising, Swelling, and Edema; large hip bruise, varicose veins  (R) Heel/Foot/Toe Swelling and Edema; wound due to skin cancer  (L) Heel/Foot/Toe Swelling and Edema; wound due to skin cancer          Devices In Places Pulse Ox and Sage; ostomy bag      Interventions In Place Heel Mepilex, Sacral Mepilex, and Pillows    Possible Skin Injury Yes    Pictures Uploaded Into Epic N/A  Wound Consult Placed Yes  RN Wound Prevention Protocol Ordered Yes

## 2024-12-07 NOTE — ANESTHESIA POSTPROCEDURE EVALUATION
Patient: Jana Overton    Procedure Summary       Date: 12/07/24 Room / Location:  ENDOSCOPIC ULTRASOUND ROOM / SURGERY [facility]    Anesthesia Start: 1232 Anesthesia Stop: 1300    Procedure: EGD WITH BOTOX INJECTION (Esophagus) Diagnosis: (DYSPHAGIADYSPHAGIA)    Surgeons: Stuart Payan D.O. Responsible Provider: Kala Cassidy D.O.    Anesthesia Type: general ASA Status: 3            Final Anesthesia Type: general  Last vitals  BP   Blood Pressure : (!) 153/70    Temp   37 °C (98.6 °F)    Pulse   92   Resp   18    SpO2   92 %      Anesthesia Post Evaluation    Patient location during evaluation: PACU  Patient participation: complete - patient participated  Level of consciousness: awake and alert    Airway patency: patent  Anesthetic complications: no  Cardiovascular status: hemodynamically stable  Respiratory status: acceptable  Hydration status: euvolemic    PONV: none          There were no known notable events for this encounter.     Nurse Pain Score: 0 (NPRS)

## 2024-12-07 NOTE — ANESTHESIA TIME REPORT
Anesthesia Start and Stop Event Times       Date Time Event    12/7/2024 1220 Ready for Procedure     1232 Anesthesia Start     1300 Anesthesia Stop          Responsible Staff  12/07/24      Name Role Begin End    Kala Cassidy D.O. Anesth 1232 1300          Overtime Reason:  no overtime (within assigned shift)    Comments:

## 2024-12-08 ENCOUNTER — APPOINTMENT (OUTPATIENT)
Dept: CARDIOLOGY | Facility: MEDICAL CENTER | Age: 69
DRG: 641 | End: 2024-12-08
Attending: STUDENT IN AN ORGANIZED HEALTH CARE EDUCATION/TRAINING PROGRAM
Payer: MEDICARE

## 2024-12-08 LAB
ALBUMIN SERPL BCP-MCNC: 3.3 G/DL (ref 3.2–4.9)
ALBUMIN/GLOB SERPL: 1.2 G/DL
ALP SERPL-CCNC: 73 U/L (ref 30–99)
ALT SERPL-CCNC: 29 U/L (ref 2–50)
ANION GAP SERPL CALC-SCNC: 9 MMOL/L (ref 7–16)
AST SERPL-CCNC: 29 U/L (ref 12–45)
BASOPHILS # BLD AUTO: 0.2 % (ref 0–1.8)
BASOPHILS # BLD: 0.02 K/UL (ref 0–0.12)
BILIRUB SERPL-MCNC: 0.4 MG/DL (ref 0.1–1.5)
BUN SERPL-MCNC: 23 MG/DL (ref 8–22)
CALCIUM ALBUM COR SERPL-MCNC: 9.5 MG/DL (ref 8.5–10.5)
CALCIUM SERPL-MCNC: 8.9 MG/DL (ref 8.4–10.2)
CHLORIDE SERPL-SCNC: 106 MMOL/L (ref 96–112)
CO2 SERPL-SCNC: 24 MMOL/L (ref 20–33)
CREAT SERPL-MCNC: 1.08 MG/DL (ref 0.5–1.4)
EOSINOPHIL # BLD AUTO: 0 K/UL (ref 0–0.51)
EOSINOPHIL NFR BLD: 0 % (ref 0–6.9)
ERYTHROCYTE [DISTWIDTH] IN BLOOD BY AUTOMATED COUNT: 44.1 FL (ref 35.9–50)
GFR SERPLBLD CREATININE-BSD FMLA CKD-EPI: 55 ML/MIN/1.73 M 2
GLOBULIN SER CALC-MCNC: 2.8 G/DL (ref 1.9–3.5)
GLUCOSE SERPL-MCNC: 130 MG/DL (ref 65–99)
HCT VFR BLD AUTO: 32.2 % (ref 37–47)
HGB BLD-MCNC: 10.7 G/DL (ref 12–16)
IMM GRANULOCYTES # BLD AUTO: 0.04 K/UL (ref 0–0.11)
IMM GRANULOCYTES NFR BLD AUTO: 0.3 % (ref 0–0.9)
LV EJECT FRACT  99904: 65
LV EJECT FRACT MOD 2C 99903: 74.88
LV EJECT FRACT MOD 4C 99902: 64.42
LV EJECT FRACT MOD BP 99901: 68.29
LYMPHOCYTES # BLD AUTO: 1.13 K/UL (ref 1–4.8)
LYMPHOCYTES NFR BLD: 9.2 % (ref 22–41)
MAGNESIUM SERPL-MCNC: 1.4 MG/DL (ref 1.5–2.5)
MCH RBC QN AUTO: 31.2 PG (ref 27–33)
MCHC RBC AUTO-ENTMCNC: 33.2 G/DL (ref 32.2–35.5)
MCV RBC AUTO: 93.9 FL (ref 81.4–97.8)
MONOCYTES # BLD AUTO: 0.54 K/UL (ref 0–0.85)
MONOCYTES NFR BLD AUTO: 4.4 % (ref 0–13.4)
NEUTROPHILS # BLD AUTO: 10.61 K/UL (ref 1.82–7.42)
NEUTROPHILS NFR BLD: 85.9 % (ref 44–72)
NRBC # BLD AUTO: 0 K/UL
NRBC BLD-RTO: 0 /100 WBC (ref 0–0.2)
PHOSPHATE SERPL-MCNC: 1.1 MG/DL (ref 2.5–4.5)
PLATELET # BLD AUTO: 143 K/UL (ref 164–446)
PMV BLD AUTO: 11 FL (ref 9–12.9)
POTASSIUM SERPL-SCNC: 3.8 MMOL/L (ref 3.6–5.5)
PROT SERPL-MCNC: 6.1 G/DL (ref 6–8.2)
RBC # BLD AUTO: 3.43 M/UL (ref 4.2–5.4)
SODIUM SERPL-SCNC: 139 MMOL/L (ref 135–145)
WBC # BLD AUTO: 12.3 K/UL (ref 4.8–10.8)

## 2024-12-08 PROCEDURE — A9270 NON-COVERED ITEM OR SERVICE: HCPCS | Performed by: HOSPITALIST

## 2024-12-08 PROCEDURE — 85025 COMPLETE CBC W/AUTO DIFF WBC: CPT

## 2024-12-08 PROCEDURE — 93306 TTE W/DOPPLER COMPLETE: CPT

## 2024-12-08 PROCEDURE — 700102 HCHG RX REV CODE 250 W/ 637 OVERRIDE(OP): Performed by: HOSPITALIST

## 2024-12-08 PROCEDURE — 700111 HCHG RX REV CODE 636 W/ 250 OVERRIDE (IP): Performed by: HOSPITALIST

## 2024-12-08 PROCEDURE — 700105 HCHG RX REV CODE 258: Performed by: STUDENT IN AN ORGANIZED HEALTH CARE EDUCATION/TRAINING PROGRAM

## 2024-12-08 PROCEDURE — 99233 SBSQ HOSP IP/OBS HIGH 50: CPT | Performed by: STUDENT IN AN ORGANIZED HEALTH CARE EDUCATION/TRAINING PROGRAM

## 2024-12-08 PROCEDURE — 770020 HCHG ROOM/CARE - TELE (206)

## 2024-12-08 PROCEDURE — 83735 ASSAY OF MAGNESIUM: CPT

## 2024-12-08 PROCEDURE — 700101 HCHG RX REV CODE 250: Performed by: STUDENT IN AN ORGANIZED HEALTH CARE EDUCATION/TRAINING PROGRAM

## 2024-12-08 PROCEDURE — 80053 COMPREHEN METABOLIC PANEL: CPT

## 2024-12-08 PROCEDURE — A9270 NON-COVERED ITEM OR SERVICE: HCPCS | Performed by: STUDENT IN AN ORGANIZED HEALTH CARE EDUCATION/TRAINING PROGRAM

## 2024-12-08 PROCEDURE — 36415 COLL VENOUS BLD VENIPUNCTURE: CPT

## 2024-12-08 PROCEDURE — 700102 HCHG RX REV CODE 250 W/ 637 OVERRIDE(OP): Performed by: STUDENT IN AN ORGANIZED HEALTH CARE EDUCATION/TRAINING PROGRAM

## 2024-12-08 PROCEDURE — 700111 HCHG RX REV CODE 636 W/ 250 OVERRIDE (IP): Performed by: STUDENT IN AN ORGANIZED HEALTH CARE EDUCATION/TRAINING PROGRAM

## 2024-12-08 PROCEDURE — 84100 ASSAY OF PHOSPHORUS: CPT

## 2024-12-08 PROCEDURE — 93306 TTE W/DOPPLER COMPLETE: CPT | Mod: 26 | Performed by: INTERNAL MEDICINE

## 2024-12-08 RX ORDER — FUROSEMIDE 10 MG/ML
40 INJECTION INTRAMUSCULAR; INTRAVENOUS ONCE
Status: COMPLETED | OUTPATIENT
Start: 2024-12-08 | End: 2024-12-08

## 2024-12-08 RX ORDER — OXYBUTYNIN CHLORIDE 5 MG/1
5 TABLET ORAL 3 TIMES DAILY
Status: DISCONTINUED | OUTPATIENT
Start: 2024-12-08 | End: 2024-12-09 | Stop reason: HOSPADM

## 2024-12-08 RX ORDER — MAGNESIUM SULFATE HEPTAHYDRATE 40 MG/ML
4 INJECTION, SOLUTION INTRAVENOUS ONCE
Status: COMPLETED | OUTPATIENT
Start: 2024-12-08 | End: 2024-12-08

## 2024-12-08 RX ADMIN — HEPARIN SODIUM 5000 UNITS: 5000 INJECTION, SOLUTION INTRAVENOUS; SUBCUTANEOUS at 04:37

## 2024-12-08 RX ADMIN — FUROSEMIDE 40 MG: 10 INJECTION, SOLUTION INTRAMUSCULAR; INTRAVENOUS at 14:12

## 2024-12-08 RX ADMIN — OXYCODONE HYDROCHLORIDE 10 MG: 10 TABLET ORAL at 14:12

## 2024-12-08 RX ADMIN — GUAIFENESIN 600 MG: 600 TABLET, EXTENDED RELEASE ORAL at 04:37

## 2024-12-08 RX ADMIN — OXYCODONE 5 MG: 5 TABLET ORAL at 21:39

## 2024-12-08 RX ADMIN — HYDROXYCHLOROQUINE SULFATE 200 MG: 200 TABLET, FILM COATED ORAL at 08:13

## 2024-12-08 RX ADMIN — LIDOCAINE 2 PATCH: 4 PATCH TOPICAL at 10:50

## 2024-12-08 RX ADMIN — NYSTATIN 500000 UNITS: 100000 SUSPENSION ORAL at 20:37

## 2024-12-08 RX ADMIN — NYSTATIN 500000 UNITS: 100000 SUSPENSION ORAL at 18:38

## 2024-12-08 RX ADMIN — MAGNESIUM SULFATE HEPTAHYDRATE 4 G: 4 INJECTION, SOLUTION INTRAVENOUS at 06:09

## 2024-12-08 RX ADMIN — GUAIFENESIN 600 MG: 600 TABLET, EXTENDED RELEASE ORAL at 18:38

## 2024-12-08 RX ADMIN — DIAZEPAM 5 MG: 5 TABLET ORAL at 20:38

## 2024-12-08 RX ADMIN — METOPROLOL TARTRATE 50 MG: 50 TABLET, FILM COATED ORAL at 04:37

## 2024-12-08 RX ADMIN — SODIUM PHOSPHATE, MONOBASIC, MONOHYDRATE AND SODIUM PHOSPHATE, DIBASIC, ANHYDROUS 30 MMOL: 276; 142 INJECTION, SOLUTION INTRAVENOUS at 10:49

## 2024-12-08 RX ADMIN — OXYBUTYNIN CHLORIDE 5 MG: 5 TABLET ORAL at 14:12

## 2024-12-08 RX ADMIN — NYSTATIN 500000 UNITS: 100000 SUSPENSION ORAL at 10:50

## 2024-12-08 RX ADMIN — DEXTROMETHORPHAN 60 MG: 30 SUSPENSION, EXTENDED RELEASE ORAL at 18:38

## 2024-12-08 RX ADMIN — HYDROMORPHONE HYDROCHLORIDE 0.5 MG: 1 INJECTION, SOLUTION INTRAMUSCULAR; INTRAVENOUS; SUBCUTANEOUS at 16:36

## 2024-12-08 RX ADMIN — NYSTATIN 500000 UNITS: 100000 SUSPENSION ORAL at 14:11

## 2024-12-08 RX ADMIN — AMLODIPINE BESYLATE 2.5 MG: 5 TABLET ORAL at 04:37

## 2024-12-08 RX ADMIN — OXYCODONE HYDROCHLORIDE 10 MG: 10 TABLET ORAL at 04:47

## 2024-12-08 RX ADMIN — DEXTROMETHORPHAN 60 MG: 30 SUSPENSION, EXTENDED RELEASE ORAL at 04:37

## 2024-12-08 RX ADMIN — OXYBUTYNIN CHLORIDE 5 MG: 5 TABLET ORAL at 18:38

## 2024-12-08 RX ADMIN — CETIRIZINE HYDROCHLORIDE 10 MG: 10 TABLET, FILM COATED ORAL at 04:37

## 2024-12-08 RX ADMIN — ZOLPIDEM TARTRATE 10 MG: 5 TABLET ORAL at 22:12

## 2024-12-08 RX ADMIN — METOPROLOL TARTRATE 50 MG: 50 TABLET, FILM COATED ORAL at 18:38

## 2024-12-08 RX ADMIN — SODIUM CHLORIDE 250 MG: 9 INJECTION, SOLUTION INTRAVENOUS at 15:22

## 2024-12-08 ASSESSMENT — COGNITIVE AND FUNCTIONAL STATUS - GENERAL
SUGGESTED CMS G CODE MODIFIER MOBILITY: CH
DAILY ACTIVITIY SCORE: 24
SUGGESTED CMS G CODE MODIFIER DAILY ACTIVITY: CH
MOBILITY SCORE: 24

## 2024-12-08 ASSESSMENT — PAIN DESCRIPTION - PAIN TYPE
TYPE: ACUTE PAIN
TYPE: CHRONIC PAIN
TYPE: ACUTE PAIN

## 2024-12-08 NOTE — CARE PLAN
The patient is Stable - Low risk of patient condition declining or worsening    Shift Goals  Clinical Goals: Monitor labs/urine output, pain management  Patient Goals: Sleep, go home  Family Goals: DALTON    Progress made toward(s) clinical / shift goals:    Problem: Pain - Standard  Goal: Alleviation of pain or a reduction in pain to the patient’s comfort goal  Description: Target End Date:  Prior to discharge or change in level of care    Document on Vitals flowsheet    1.  Document pain using the appropriate pain scale per order or unit policy  2.  Educate and implement non-pharmacologic comfort measures (i.e. relaxation, distraction, massage, cold/heat therapy, etc.)  3.  Pain management medications as ordered  4.  Reassess pain after pain med administration per policy  5.  If opiods administered assess patient's response to pain medication is appropriate per POSS sedation scale  6.  Follow pain management plan developed in collaboration with patient and interdisciplinary team (including palliative care or pain specialists if applicable)  Outcome: Progressing  Note: Patient had muscle spasms early in the shift. The patient received one dose of diazepam. Patient did not require any additional pain medication this shift.     Problem: Knowledge Deficit - Standard  Goal: Patient and family/care givers will demonstrate understanding of plan of care, disease process/condition, diagnostic tests and medications  Description: Target End Date:  1-3 days or as soon as patient condition allows    Document in Patient Education    1.  Patient and family/caregiver oriented to unit, equipment, visitation policy and means for communicating concern  2.  Complete/review Learning Assessment  3.  Assess knowledge level of disease process/condition, treatment plan, diagnostic tests and medications  4.  Explain disease process/condition, treatment plan, diagnostic tests and medications  Outcome: Progressing  Note: Plan of care discussed  with the patient. Patient understands the plan of care.     Problem: Fall Risk  Goal: Patient will remain free from falls  Description: Target End Date:  Prior to discharge or change in level of care    Document interventions on the Meka Raymond Fall Risk Assessment    1.  Assess for fall risk factors  2.  Implement fall precautions  Outcome: Progressing  Note: Patient remains free of falls this shift. The patient is steady on her feet patient calls for assistance if she feels unsteady.        Patient is not progressing towards the following goals:

## 2024-12-08 NOTE — PROGRESS NOTES
[facility] Medicine Daily Progress Note    Date of Service  12/7/2024    Chief Complaint  Jana Overton is a 69 y.o. female admitted 12/4/2024 with N/V.    Hospital Course  Jana Overton is a 69 y.o. 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  Patient denies noticing fevers or chills.  She denies noticing any blood in her vomitus or her ostomy output.  Patient reports that her vomiting is so severe that is not able to tolerate anything orally including medications.     Interval Problem Update  12/5  Afebrile, normal HR/RR, -150 SpO2 94-98% on room air.  Hgb from 14-11.2, no bleeding, I suspect dilutional as all cell lines dropped, she is quite frail and was given IVF.  Renal function improving 2.50-2.02, Mag 1.6, UA not infectious.  She has long history of IBD, checked inflammatory markers and not elevated. Still with abdominal pain, has had difficulty with PO intake.   Replaced magnesium, ordered supplements, GI soft diet ordered after discussion with patient, IVF ordered for Hemal and decreased PO intake.    12/6 Had a lot of vomiting this morning with attempts at PO intake. Feels like it is getting stuck and she may need a dilation again. I consulted Dr. Sandoval and she will evaluate patient.   Replaced sodium, restarted home zyrtec and mucinex at her request, delsym ordered as well as lidocaine patch.  Continue to watch PO intake, renal function (which has significantly improved), electrolytes. She is up walking around the unit without difficulty.     12/7  Afebrile pulse from the 70s to low 100s normal respiratory rate systolic blood pressure from 110s to 150s pulse ox 91 to 100% on room air.  All 3 cell lines slightly increased, she is having difficulty getting p.o. intake and, suspect hemoconcentration.  Sodium 146, potassium 3.5, serum  creatinine 1.06.  EGD today with GI, treatment of achalasia with Botox injection, tolerated well.  Continue to monitor electrolytes, leukocytosis renal function.  Urine retention, milton placed after unsuccessful attempts to urinate.  Up walking around unit.    I have discussed this patient's plan of care and discharge plan at IDT rounds today with Case Management, Nursing, Nursing leadership, and other members of the IDT team.    Consultants/Specialty  NA    Code Status  Full Code    Disposition  The patient is not medically cleared for discharge to home or a post-acute facility.      I have placed the appropriate orders for post-discharge needs.    Review of Systems  ROS   Review of Systems   Constitutional:  Positive for malaise/fatigue. Negative for chills and fever.   Eyes:  Negative for discharge and redness.   Respiratory:  Negative for cough, shortness of breath and stridor.    Cardiovascular:  Negative for chest pain and leg swelling.   Gastrointestinal:  Positive for diarrhea, nausea and vomiting.        Increase watery output from the ostomy site   Genitourinary:  Negative for flank pain.   Musculoskeletal:  Negative for myalgias.   Skin: Negative.    Neurological:  Negative for focal weakness.   Endo/Heme/Allergies:  Bruises/bleeds easily.   Psychiatric/Behavioral:  The patient is not nervous/anxious.    Physical Exam  Temp:  [36.4 °C (97.6 °F)-37.2 °C (99 °F)] 36.6 °C (97.9 °F)  Pulse:  [] 89  Resp:  [15-20] 18  BP: (114-153)/(41-70) 131/56  SpO2:  [91 %-100 %] 91 %    Physical Exam  Vitals and nursing note reviewed.   Constitutional:       General: She is not in acute distress.     Appearance: She is not ill-appearing or toxic-appearing.   HENT:      Head: Normocephalic and atraumatic.      Nose: Nose normal. No rhinorrhea.      Mouth/Throat:      Mouth: Mucous membranes are dry.      Pharynx: Oropharynx is clear.   Eyes:      General: No scleral icterus.     Extraocular Movements: Extraocular  movements intact.      Conjunctiva/sclera: Conjunctivae normal.   Cardiovascular:      Rate and Rhythm: Normal rate and regular rhythm.      Pulses: Normal pulses.   Pulmonary:      Effort: No respiratory distress.      Breath sounds: No wheezing, rhonchi or rales.   Abdominal:      General: There is no distension.      Palpations: Abdomen is soft.      Tenderness: There is abdominal tenderness (moderate generalized, ostomy noted, no apparent surrounding infection, no bleeding). There is no guarding or rebound.      Comments: Multiple abdominal surgical scars   Musculoskeletal:         General: Normal range of motion.      Cervical back: Normal range of motion and neck supple. No rigidity.      Right lower leg: No edema.      Left lower leg: No edema.   Skin:     General: Skin is warm and dry.      Capillary Refill: Capillary refill takes less than 2 seconds.      Coloration: Skin is not jaundiced.      Findings: Bruising present.   Neurological:      General: No focal deficit present.      Mental Status: She is alert and oriented to person, place, and time. Mental status is at baseline.      Cranial Nerves: No cranial nerve deficit.      Motor: No weakness.      Gait: Gait normal.   Psychiatric:         Mood and Affect: Mood normal.         Behavior: Behavior normal.         Thought Content: Thought content normal.         Judgment: Judgment normal.         Fluids    Intake/Output Summary (Last 24 hours) at 12/8/2024 0509  Last data filed at 12/7/2024 2045  Gross per 24 hour   Intake 1170 ml   Output 1300 ml   Net -130 ml        Laboratory  Recent Labs     12/06/24  0210 12/07/24  0914 12/08/24  0405   WBC 6.5 16.7* 12.3*   RBC 3.24* 3.74* 3.43*   HEMOGLOBIN 10.0* 11.7* 10.7*   HEMATOCRIT 30.4* 34.3* 32.2*   MCV 93.8 91.7 93.9   MCH 30.9 31.3 31.2   MCHC 32.9 34.1 33.2   RDW 44.0 43.1 44.1   PLATELETCT 149* 163* 143*   MPV 10.8 10.8 11.0     Recent Labs     12/06/24  0210 12/07/24  0914 12/08/24  0405   SODIUM 141  146* 139   POTASSIUM 4.3 3.5* 3.8   CHLORIDE 109 108 106   CO2 24 23 24   GLUCOSE 111* 108* 130*   BUN 43* 21 23*   CREATININE 1.40 1.06 1.08   CALCIUM 8.5 8.4 8.9                   Imaging  DX-CHEST-PORTABLE (1 VIEW)   Final Result      Linear atelectasis within the left lung.      CT-ABDOMEN-PELVIS W/O   Final Result      1.  Prior colectomy with left lower quadrant ostomy site.      2.  No evidence of bowel obstruction or focal inflammatory change.      3.  Linear atelectasis versus scarring within the left lung base.           Assessment/Plan  * FLORES (acute kidney injury) ([facility])- (present on admission)  Assessment & Plan  Mostly due to dehydration   I will start intravenous fluids  Avoid / minimize nephrotoxins as much as possible.  Monitor inputs and outputs  I will hold home furosemide for now, given her acute kidney injury and dehydration     Easy bruising- (present on admission)  Assessment & Plan  Platelet count appear within normal limits.  Hemoglobin appears stable.  I will monitor hemoglobin and platelet count with daily labs.     ACP (advance care planning)- (present on admission)  Assessment & Plan  I had a discussion with the patient regarding goals of care, diagnoses, prognosis, and CODE STATUS.  We discussed her diagnoses, prognosis and comorbidities. The patient has advanced age of 69 years.  She has a number of chronic medical problems including chronic disease, chronic heart failure, and is presenting with intractable nausea and vomiting with severe dehydration.  However the patient enjoys a very good cognitive and functional capacity.  She hopes to live another 30 years.  At this point the patient wants to maintain a full code.  She is open to all forms of invasive or noninvasive diagnostic and therapeutic interventions.      Intractable nausea and vomiting- (present on admission)  Assessment & Plan  We will check CT abdomen pelvis for further evaluation.  Bowel rest with supportive care and a  modified diet  Intravenous fluids.  I will check gastrointestinal PCR panel  CT abdomen shows prior colectomy with left lower quadrant ostomy site. No evidence of bowel obstruction or focal inflammatory change.   Antiemetics as needed    Increased ostomy output- (present on admission)  Assessment & Plan  We will check CT abdomen pelvis for further evaluation.  Bowel rest with supportive care and a modified diet  Intravenous fluids.  I will check gastrointestinal PCR panel  CT abdomen shows prior colectomy with left lower quadrant ostomy site. No evidence of bowel obstruction or focal inflammatory change.     Metabolic acidosis- (present on admission)  Assessment & Plan  Likely due to reduced intravascular volume and increase bicarb losses through increased ostomy output  I will start intravenous fluids and bicarb  Anticipate improvement with intravenous fluids  Continue to monitor, I will order follow-up bicarb level         Chronic diastolic congestive heart failure (HCC)- (present on admission)  Assessment & Plan  Not currently in exacerbation but is at risk for so.  I will start amlodipine and metoprolol with hold parameters  I will hold home furosemide for now, given her acute kidney injury and dehydration      Essential hypertension- (present on admission)  Assessment & Plan  I will start amlodipine and metoprolol with hold parameters  I will hold home furosemide for now, given her acute kidney injury and dehydration    Dehydration- (present on admission)  Assessment & Plan  Likely secondary to reduced oral intake, and increase in ostomy output  I will hold home furosemide for now   Encourage oral intake as tolerated, antiemetics as needed.  Intravenous hydration until adequate oral intake is achieved     Atrial fibrillation (HCC)- (present on admission)  Assessment & Plan  Does not tolerate anticoagulation due to easy bleeding  I will resume home metoprolol with hold parameters    GERD (gastroesophageal reflux  disease)- (present on admission)  Assessment & Plan  I will start Tums as needed     Crohn's disease of small intestine with complication (HCC)- (present on admission)  Assessment & Plan  Status post colectomy.  Now has ileostomy          VTE prophylaxis:   SCDs/TEDs      I have performed a physical exam and reviewed and updated ROS and Plan today (12/7/2024). In review of yesterday's note (12/6/2024), there are no changes except as documented above.  Total time spent 54 minutes. I spent greater than 50% of the time for patient care, counseling, and coordination on this date, including unit/floor time, and face-to-face time with the patient as per interval events, my own review of patient's imaging and lab analysis and developing my assessment and plan above.

## 2024-12-08 NOTE — CARE PLAN
The patient is Stable - Low risk of patient condition declining or worsening    Shift Goals  Clinical Goals: Monitor labs and vitals  Patient Goals: rest and comfort  Family Goals: DALTON    Progress made toward(s) clinical / shift goals: Patient ambulated in the hallway. Patient consulted with MD at nurses station. Patient received electrolyte replacement via IV. Patient continued to exhibit anxiety and have high, non-medical needs throughout the shift.       Problem: Pain - Standard  Goal: Alleviation of pain or a reduction in pain to the patient’s comfort goal  Outcome: Progressing  Note: Patient verbalizes pain using 0-10 scale. Patient uses pharmacological and non-pharmacological methods of pain management.       Problem: Knowledge Deficit - Standard  Goal: Patient and family/care givers will demonstrate understanding of plan of care, disease process/condition, diagnostic tests and medications  Outcome: Progressing  Note: Patient verbalizes understanding of plan of care and barriers to discharge. Patient asks appropriate questions about plan of care.       Problem: Fall Risk  Goal: Patient will remain free from falls  Outcome: Progressing  Note: Fall precautions are in place. Patient uses call light appropriately.         Patient is not progressing towards the following goals:

## 2024-12-08 NOTE — PROGRESS NOTES
Patient used call light excessively and came into the hallway requesting assistance every 10-15 minutes during beginning of shift (7am-11am). CNA, nursing student, RN, and other staff members were being pulled by patient for assistance, which ranged from grabbing a [employer] order at the hospital's  to requesting ICU nursing staff to grab her heated blankets while she was walking in the hallway (rather than ask assigned RN). Patient was asked to be mindful of other patients and to be mindful of staffing resources and time, patient became agitated. Patient complained about assigned RN to the , nursing student, and other staff members. RN attempted to have a conversation with the patient about appropriate boundaries, which was unproductive. Charge nurse notified of patient behaviors.

## 2024-12-09 ENCOUNTER — PHARMACY VISIT (OUTPATIENT)
Dept: PHARMACY | Facility: MEDICAL CENTER | Age: 69
End: 2024-12-09
Payer: COMMERCIAL

## 2024-12-09 VITALS
BODY MASS INDEX: 17.92 KG/M2 | SYSTOLIC BLOOD PRESSURE: 123 MMHG | HEIGHT: 72 IN | WEIGHT: 132.28 LBS | RESPIRATION RATE: 16 BRPM | OXYGEN SATURATION: 93 % | DIASTOLIC BLOOD PRESSURE: 65 MMHG | TEMPERATURE: 98 F | HEART RATE: 77 BPM

## 2024-12-09 LAB
ANION GAP SERPL CALC-SCNC: 10 MMOL/L (ref 7–16)
BASOPHILS # BLD AUTO: 0.3 % (ref 0–1.8)
BASOPHILS # BLD: 0.03 K/UL (ref 0–0.12)
BUN SERPL-MCNC: 23 MG/DL (ref 8–22)
CALCIUM SERPL-MCNC: 9 MG/DL (ref 8.4–10.2)
CHLORIDE SERPL-SCNC: 103 MMOL/L (ref 96–112)
CO2 SERPL-SCNC: 25 MMOL/L (ref 20–33)
CREAT SERPL-MCNC: 1.15 MG/DL (ref 0.5–1.4)
EOSINOPHIL # BLD AUTO: 0.12 K/UL (ref 0–0.51)
EOSINOPHIL NFR BLD: 1.2 % (ref 0–6.9)
ERYTHROCYTE [DISTWIDTH] IN BLOOD BY AUTOMATED COUNT: 44.2 FL (ref 35.9–50)
GFR SERPLBLD CREATININE-BSD FMLA CKD-EPI: 51 ML/MIN/1.73 M 2
GLUCOSE SERPL-MCNC: 104 MG/DL (ref 65–99)
HCT VFR BLD AUTO: 31.4 % (ref 37–47)
HGB BLD-MCNC: 10.5 G/DL (ref 12–16)
IMM GRANULOCYTES # BLD AUTO: 0.04 K/UL (ref 0–0.11)
IMM GRANULOCYTES NFR BLD AUTO: 0.4 % (ref 0–0.9)
LYMPHOCYTES # BLD AUTO: 1.63 K/UL (ref 1–4.8)
LYMPHOCYTES NFR BLD: 16.5 % (ref 22–41)
MAGNESIUM SERPL-MCNC: 2 MG/DL (ref 1.5–2.5)
MCH RBC QN AUTO: 31.5 PG (ref 27–33)
MCHC RBC AUTO-ENTMCNC: 33.4 G/DL (ref 32.2–35.5)
MCV RBC AUTO: 94.3 FL (ref 81.4–97.8)
MONOCYTES # BLD AUTO: 0.78 K/UL (ref 0–0.85)
MONOCYTES NFR BLD AUTO: 7.9 % (ref 0–13.4)
NEUTROPHILS # BLD AUTO: 7.26 K/UL (ref 1.82–7.42)
NEUTROPHILS NFR BLD: 73.7 % (ref 44–72)
NRBC # BLD AUTO: 0 K/UL
NRBC BLD-RTO: 0 /100 WBC (ref 0–0.2)
PHOSPHATE SERPL-MCNC: 2.8 MG/DL (ref 2.5–4.5)
PLATELET # BLD AUTO: 139 K/UL (ref 164–446)
PMV BLD AUTO: 11.4 FL (ref 9–12.9)
POTASSIUM SERPL-SCNC: 3.5 MMOL/L (ref 3.6–5.5)
RBC # BLD AUTO: 3.33 M/UL (ref 4.2–5.4)
SODIUM SERPL-SCNC: 138 MMOL/L (ref 135–145)
WBC # BLD AUTO: 9.9 K/UL (ref 4.8–10.8)

## 2024-12-09 PROCEDURE — A9270 NON-COVERED ITEM OR SERVICE: HCPCS | Performed by: HOSPITALIST

## 2024-12-09 PROCEDURE — 84100 ASSAY OF PHOSPHORUS: CPT

## 2024-12-09 PROCEDURE — A9270 NON-COVERED ITEM OR SERVICE: HCPCS | Performed by: STUDENT IN AN ORGANIZED HEALTH CARE EDUCATION/TRAINING PROGRAM

## 2024-12-09 PROCEDURE — 700111 HCHG RX REV CODE 636 W/ 250 OVERRIDE (IP): Performed by: HOSPITALIST

## 2024-12-09 PROCEDURE — 700105 HCHG RX REV CODE 258: Performed by: STUDENT IN AN ORGANIZED HEALTH CARE EDUCATION/TRAINING PROGRAM

## 2024-12-09 PROCEDURE — 700111 HCHG RX REV CODE 636 W/ 250 OVERRIDE (IP): Mod: JZ | Performed by: STUDENT IN AN ORGANIZED HEALTH CARE EDUCATION/TRAINING PROGRAM

## 2024-12-09 PROCEDURE — 36415 COLL VENOUS BLD VENIPUNCTURE: CPT

## 2024-12-09 PROCEDURE — 80048 BASIC METABOLIC PNL TOTAL CA: CPT

## 2024-12-09 PROCEDURE — 700102 HCHG RX REV CODE 250 W/ 637 OVERRIDE(OP): Performed by: STUDENT IN AN ORGANIZED HEALTH CARE EDUCATION/TRAINING PROGRAM

## 2024-12-09 PROCEDURE — 83735 ASSAY OF MAGNESIUM: CPT

## 2024-12-09 PROCEDURE — 700101 HCHG RX REV CODE 250: Performed by: STUDENT IN AN ORGANIZED HEALTH CARE EDUCATION/TRAINING PROGRAM

## 2024-12-09 PROCEDURE — 99239 HOSP IP/OBS DSCHRG MGMT >30: CPT | Performed by: STUDENT IN AN ORGANIZED HEALTH CARE EDUCATION/TRAINING PROGRAM

## 2024-12-09 PROCEDURE — RXMED WILLOW AMBULATORY MEDICATION CHARGE: Performed by: STUDENT IN AN ORGANIZED HEALTH CARE EDUCATION/TRAINING PROGRAM

## 2024-12-09 PROCEDURE — 94760 N-INVAS EAR/PLS OXIMETRY 1: CPT

## 2024-12-09 PROCEDURE — 85025 COMPLETE CBC W/AUTO DIFF WBC: CPT

## 2024-12-09 PROCEDURE — 700102 HCHG RX REV CODE 250 W/ 637 OVERRIDE(OP): Performed by: HOSPITALIST

## 2024-12-09 RX ORDER — OXYBUTYNIN CHLORIDE 5 MG/1
5 TABLET ORAL 3 TIMES DAILY
Qty: 30 TABLET | Refills: 0 | Status: SHIPPED | OUTPATIENT
Start: 2024-12-09 | End: 2024-12-16 | Stop reason: SDUPTHER

## 2024-12-09 RX ORDER — DEXTROMETHORPHAN POLISTIREX 30 MG/5ML
60 SUSPENSION ORAL 2 TIMES DAILY
Qty: 280 ML | Refills: 0 | Status: SHIPPED | OUTPATIENT
Start: 2024-12-09

## 2024-12-09 RX ORDER — FUROSEMIDE 20 MG/1
20 TABLET ORAL
Qty: 30 TABLET | Refills: 0 | Status: SHIPPED | OUTPATIENT
Start: 2024-12-09

## 2024-12-09 RX ADMIN — OXYBUTYNIN CHLORIDE 5 MG: 5 TABLET ORAL at 17:28

## 2024-12-09 RX ADMIN — OXYCODONE 5 MG: 5 TABLET ORAL at 04:40

## 2024-12-09 RX ADMIN — NYSTATIN 500000 UNITS: 100000 SUSPENSION ORAL at 17:28

## 2024-12-09 RX ADMIN — HYDROMORPHONE HYDROCHLORIDE 0.5 MG: 1 INJECTION, SOLUTION INTRAMUSCULAR; INTRAVENOUS; SUBCUTANEOUS at 05:32

## 2024-12-09 RX ADMIN — METOPROLOL TARTRATE 50 MG: 50 TABLET, FILM COATED ORAL at 17:28

## 2024-12-09 RX ADMIN — NYSTATIN 500000 UNITS: 100000 SUSPENSION ORAL at 10:10

## 2024-12-09 RX ADMIN — GUAIFENESIN 600 MG: 600 TABLET, EXTENDED RELEASE ORAL at 17:28

## 2024-12-09 RX ADMIN — LIDOCAINE 2 PATCH: 4 PATCH TOPICAL at 10:11

## 2024-12-09 RX ADMIN — CETIRIZINE HYDROCHLORIDE 10 MG: 10 TABLET, FILM COATED ORAL at 05:22

## 2024-12-09 RX ADMIN — OXYCODONE HYDROCHLORIDE 10 MG: 10 TABLET ORAL at 13:44

## 2024-12-09 RX ADMIN — AMLODIPINE BESYLATE 2.5 MG: 5 TABLET ORAL at 10:10

## 2024-12-09 RX ADMIN — DEXTROMETHORPHAN 60 MG: 30 SUSPENSION, EXTENDED RELEASE ORAL at 17:28

## 2024-12-09 RX ADMIN — HYDROXYCHLOROQUINE SULFATE 200 MG: 200 TABLET, FILM COATED ORAL at 10:10

## 2024-12-09 RX ADMIN — OXYBUTYNIN CHLORIDE 5 MG: 5 TABLET ORAL at 05:22

## 2024-12-09 RX ADMIN — NYSTATIN 500000 UNITS: 100000 SUSPENSION ORAL at 12:03

## 2024-12-09 RX ADMIN — OXYBUTYNIN CHLORIDE 5 MG: 5 TABLET ORAL at 12:03

## 2024-12-09 RX ADMIN — OXYCODONE HYDROCHLORIDE 10 MG: 10 TABLET ORAL at 10:11

## 2024-12-09 RX ADMIN — SODIUM CHLORIDE 250 MG: 9 INJECTION, SOLUTION INTRAVENOUS at 10:16

## 2024-12-09 RX ADMIN — GUAIFENESIN 600 MG: 600 TABLET, EXTENDED RELEASE ORAL at 05:22

## 2024-12-09 RX ADMIN — METOPROLOL TARTRATE 50 MG: 50 TABLET, FILM COATED ORAL at 10:11

## 2024-12-09 RX ADMIN — DEXTROMETHORPHAN 60 MG: 30 SUSPENSION, EXTENDED RELEASE ORAL at 05:26

## 2024-12-09 RX ADMIN — PROCHLORPERAZINE MALEATE 10 MG: 10 TABLET ORAL at 00:04

## 2024-12-09 ASSESSMENT — PAIN DESCRIPTION - PAIN TYPE
TYPE: ACUTE PAIN

## 2024-12-09 NOTE — DIETARY
Nutrition Update: Follow-up for PO intake  Day 2 of admit.  Jana Overton is a 69 y.o. female with admitting DX of FLORES (acute kidney injury) (HCC) [N17.9].    Current Diet: Level 4-pureed diet with double portions and Level 0 - thin liquids. High protein snacks TID and Magic Cup BID. Recorded PO intake has been % of most of her meals and snacks. Per flowsheets, she has had triple portions at times.     Per GI's note: Patient underwent an EGD with Botox injection at the GE junction. This improved her esophageal discomfort and she is able to tolerate liquids and food now.       Based on RD assessment at this time, Patient does not meet criteria in congruence with ASPEN/Academy guidelines for malnutrition    Altered GI Function related to inability to use nutrition due to Crohn's flare as evidenced by report of N/V/D immediately after all intake.    Nutrition Dx Status: Resolved     Problem: Nutritional:  Goal: Achieve adequate nutritional intake  Description: Patient will consume >50% of meals  Outcome: met    RD following weekly or PRN.

## 2024-12-09 NOTE — PROGRESS NOTES
Gastroenterology Progress Note         Author: Monserrat Sandoval D.O.                                 Date & Time Created: 12/8/2024 5:35 PM         Chief Complaint: Dysphagia    Interval History: This is a 69-year-old female with Crohn's disease (s/p colectomy and ileostomy) and achalasia who is consulted for worsening esophageal dysphagia to both liquids and solids.  Patient is on a puréed diet.  Patient has a history of achalasia.  Patient had an esophageal manometry in 2019 when she was under care by [facility] which revealed hypertensive LES, normal peristalsis, median IRP elevated and pan esophageal pressurization suggestive of type II achalasia.  The study was limited due to patient's intolerance of the study.  Patient underwent an EGD on 6/15/2024 with TTS dilation up to 20 mm.  Patient reports that she felt improvement for 1 month but her esophageal dysphagia returned.     Patient underwent an EGD with Botox injection at the GE junction.  Patient is doing better today and reports that she has no further distal esophageal discomfort like she had before.  She is able to tolerate liquids and food.    Review of Systems:  General: No fevers, chills, unintentional, weight loss.  HEENT: No scleral icterus, gum bleed.  Positive dysphagia.  Cardiology: No chest pain, palpitations, orthopnea.  Respiratory: No dyspnea, cough, wheezing.  Gastrointestinal: No abdominal pain, nausea, vomiting, changes in bowel habits, rectal bleed.  Genitourinary: No hematuria, dysuria, urgency.  Neurologic: No changes in memory, confusion, gait instability.  Skin: No ecchymosis, jaundice, telangiectasia.    Physical Exam:  Vitals:    12/08/24 0422 12/08/24 0715 12/08/24 1404 12/08/24 1522   BP: 131/56 (!) 104/36 137/57 (!) 134/91   Pulse: 89 80 82 96   Resp: 18 18 18 18   Temp: 36.6 °C (97.9 °F) 36.6 °C (97.8 °F) 36.2 °C (97.1 °F) 36.8 °C (98.3 °F)   TempSrc: Oral Temporal Temporal Temporal   SpO2: 91% 94% 99% 96%   Weight:       Height:          General: No acute cardiopulmonary distress.  Head: Normocephalic.  EENT: Scleral anicterus. Mucosa moist.   Respiratory: Breath sounds present. Symmetrical rise of anterior thorax.  Cardiovascular: Normal S1, S2.  Gastrointestinal: Soft, nontender, nondistended. Normoactive bowel sounds. No guarding.  Neurologic: Alert and oriented.  Skin: Warm and dry.    Labs:              Recent Labs     24  0914 24  0405   SODIUM 141 146* 139   POTASSIUM 4.3 3.5* 3.8   CHLORIDE 109 108 106   CO2  24   BUN 43* 21 23*   CREATININE 1.40 1.06 1.08   MAGNESIUM 2.0  --  1.4*   PHOSPHORUS 2.0*  --  1.1*   CALCIUM 8.5 8.4 8.9       Recent Labs     24  0914 24  0405   ALTSGPT  --  28 29   ASTSGOT  --  29 29   ALKPHOSPHAT  --  77 73   TBILIRUBIN  --  0.6 0.4   GLUCOSE 111* 108* 130*       Recent Labs     24  0914 24  0405   RBC 3.24* 3.74* 3.43*   HEMOGLOBIN 10.0* 11.7* 10.7*   HEMATOCRIT 30.4* 34.3* 32.2*   PLATELETCT 149* 163* 143*   IRON  --  27*  --    FERRITIN  --  271.0  --    TOTIRONBC  --  289  --        Recent Labs     24  0914 24  0405   WBC 6.5 16.7* 12.3*   NEUTSPOLYS 62.10 89.80* 85.90*   LYMPHOCYTES 24.00 4.10* 9.20*   MONOCYTES 10.80 5.50 4.40   EOSINOPHILS 2.20 0.00 0.00   BASOPHILS 0.60 0.20 0.20   ASTSGOT  --  29 29   ALTSGPT  --  28 29   ALKPHOSPHAT  --  77 73   TBILIRUBIN  --  0.6 0.4       Hemodynamics:    Temp (24hrs), Av.7 °C (98 °F), Min:36.2 °C (97.1 °F), Max:36.9 °C (98.4 °F)  Temperature: 36.8 °C (98.3 °F)    Pulse  Av.1  Min: 71  Max: 109     Blood Pressure : (!) 134/91       Respiratory:      Respiration: 18, Pulse Oximetry: 96 %                   Fluids:      Intake/Output Summary (Last 24 hours) at 2024 1735  Last data filed at 2024 0422  Gross per 24 hour   Intake 600 ml   Output 350 ml   Net 250 ml            GI/Nutrition:    Orders Placed This Encounter   Procedures     Diet Order Diet: Level 4 - Pureed; Liquid level: Level 0 - Thin; Tray Modifications (optional): Double Portions     Standing Status:   Standing     Number of Occurrences:   1     Order Specific Question:   Diet:     Answer:   Level 4 - Pureed [25]     Order Specific Question:   Liquid level     Answer:   Level 0 - Thin     Order Specific Question:   Tray Modifications (optional)     Answer:   Double Portions       Medical Decision Making, by Problem:    Active Hospital Problems    Diagnosis     *FLORES (acute kidney injury) (HCC) [N17.9]     Metabolic acidosis [E87.20]     Increased ostomy output [R19.7]     Intractable nausea and vomiting [R11.2]     ACP (advance care planning) [Z71.89]     Easy bruising [R23.3]     Chronic diastolic congestive heart failure (HCC) [I50.32]     Essential hypertension [I10]     Dehydration [E86.0]     Atrial fibrillation (HCC) [I48.91]     GERD (gastroesophageal reflux disease) [K21.9]     Crohn's disease of small intestine with complication ([facility]) [K50.019]             ASSESSMENT:   -Esophageal dysphagia secondary to type II achalasia  -Unintentional weight loss secondary to above  -History of Crohn's disease s/p total colectomy and ileostomy        PLAN:   Patient has worsening dysphagia to both liquids and solids secondary to type II achalasia.  She had an EGD with Botox to the GE junction on 12/7/2024.  Patient reports that she is feeling better today and is able to tolerate more food.  Patient was advised to follow-up with the [facility] for achalasia management which she will schedule.  Patient has an outpatient appointment with [facility] in January.  She is on a puréed diet and slowly advance to soft diet as tolerated.          We will sign off. Thank you for the consultation. Please call with any questions or concerns.         [employer]    Monserrat Sandoval D.O.  Gastroenterology  [facility]  [phone]

## 2024-12-09 NOTE — CARE PLAN
The patient is Watcher - Medium risk of patient condition declining or worsening    Shift Goals  Clinical Goals: Monitor Vitals  Patient Goals: Comfort  Family Goals: DALTON    Progress made toward(s) clinical / shift goals:    Problem: Pain - Standard  Goal: Alleviation of pain or a reduction in pain to the patient’s comfort goal  Outcome: Progressing     Problem: Knowledge Deficit - Standard  Goal: Patient and family/care givers will demonstrate understanding of plan of care, disease process/condition, diagnostic tests and medications  Outcome: Progressing     Problem: Fall Risk  Goal: Patient will remain free from falls  Outcome: Progressing       Patient is not progressing towards the following goals:

## 2024-12-09 NOTE — PROGRESS NOTES
[facility] Medicine Daily Progress Note    Date of Service  12/8/2024    Chief Complaint  Jana Overton is a 69 y.o. female admitted 12/4/2024 with N/V.    Hospital Course  Jana Overton is a 69 y.o. 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  Patient denies noticing fevers or chills.  She denies noticing any blood in her vomitus or her ostomy output.  Patient reports that her vomiting is so severe that is not able to tolerate anything orally including medications.     Interval Problem Update  12/5  Afebrile, normal HR/RR, -150 SpO2 94-98% on room air.  Hgb from 14-11.2, no bleeding, I suspect dilutional as all cell lines dropped, she is quite frail and was given IVF.  Renal function improving 2.50-2.02, Mag 1.6, UA not infectious.  She has long history of IBD, checked inflammatory markers and not elevated. Still with abdominal pain, has had difficulty with PO intake.   Replaced magnesium, ordered supplements, GI soft diet ordered after discussion with patient, IVF ordered for Hemal and decreased PO intake.    12/6 Had a lot of vomiting this morning with attempts at PO intake. Feels like it is getting stuck and she may need a dilation again. I consulted Dr. Sandoval and she will evaluate patient.   Replaced sodium, restarted home zyrtec and mucinex at her request, delsym ordered as well as lidocaine patch.  Continue to watch PO intake, renal function (which has significantly improved), electrolytes. She is up walking around the unit without difficulty.     12/7  Afebrile pulse from the 70s to low 100s normal respiratory rate systolic blood pressure from 110s to 150s pulse ox 91 to 100% on room air.  All 3 cell lines slightly increased, she is having difficulty getting p.o. intake and, suspect hemoconcentration.  Sodium 146, potassium 3.5, serum  creatinine 1.06.  EGD today with GI, treatment of achalasia with Botox injection, tolerated well.  Continue to monitor electrolytes, leukocytosis renal function.  Urine retention, milton placed after unsuccessful attempts to urinate.  Up walking around unit.    12/8  Patient is afebrile normal pulse and respiratory rate systolic blood pressure of 140 150 pulse ox 94 to 99% on room air.  Leukocytosis improving down to 12.3, stable anemia 10.7 thrombocytopenia 143 glucose 130 BUN 23 serum creatinine 1.08 phosphorus 1.1 magnesium 1.4.  Replace magnesium and phosphorus.  Having significant lower extremity edema, started on IV Lasix.  Having significant bladder pain with the [place], will trial oxybutynin, anemic and low iron, IV iron started.  Hopeful may be to discharge tomorrow, she will have to follow-up with urology as outpatient, she already has a urologist.    I have discussed this patient's plan of care and discharge plan at IDT rounds today with Case Management, Nursing, Nursing leadership, and other members of the IDT team.    Consultants/Specialty  NA    Code Status  Full Code    Disposition  The patient is not medically cleared for discharge to home or a post-acute facility.      I have placed the appropriate orders for post-discharge needs.    Review of Systems  ROS   Review of Systems   Constitutional:  Positive for malaise/fatigue. Negative for chills and fever.   Eyes:  Negative for discharge and redness.   Respiratory:  Negative for cough, shortness of breath and stridor.    Cardiovascular:  Negative for chest pain and leg swelling.   Gastrointestinal:  Positive for diarrhea, nausea and vomiting.        Increase watery output from the ostomy site   Genitourinary:  Negative for flank pain.   Musculoskeletal:  Negative for myalgias.   Skin: Negative.    Neurological:  Negative for focal weakness.   Endo/Heme/Allergies:  Bruises/bleeds easily.   Psychiatric/Behavioral:  The patient is not  nervous/anxious.    Physical Exam  Temp:  [36.2 °C (97.1 °F)-36.9 °C (98.4 °F)] 36.4 °C (97.5 °F)  Pulse:  [80-96] 93  Resp:  [18-20] 20  BP: (104-156)/(36-91) 156/64  SpO2:  [91 %-99 %] 99 %    Physical Exam  Vitals and nursing note reviewed.   Constitutional:       General: She is not in acute distress.     Appearance: She is not ill-appearing or toxic-appearing.   HENT:      Head: Normocephalic and atraumatic.      Nose: Nose normal. No rhinorrhea.      Mouth/Throat:      Mouth: Mucous membranes are dry.      Pharynx: Oropharynx is clear.   Eyes:      General: No scleral icterus.     Extraocular Movements: Extraocular movements intact.      Conjunctiva/sclera: Conjunctivae normal.   Cardiovascular:      Rate and Rhythm: Normal rate and regular rhythm.      Pulses: Normal pulses.   Pulmonary:      Effort: No respiratory distress.      Breath sounds: No wheezing, rhonchi or rales.   Abdominal:      General: There is no distension.      Palpations: Abdomen is soft.      Tenderness: There is abdominal tenderness (moderate generalized, ostomy noted, no apparent surrounding infection, no bleeding). There is no guarding or rebound.      Comments: Multiple abdominal surgical scars   Musculoskeletal:         General: Normal range of motion.      Cervical back: Normal range of motion and neck supple. No rigidity.      Right lower leg: Edema present.      Left lower leg: Edema present.   Skin:     General: Skin is warm and dry.      Capillary Refill: Capillary refill takes less than 2 seconds.      Coloration: Skin is not jaundiced.      Findings: Bruising present.   Neurological:      General: No focal deficit present.      Mental Status: She is alert and oriented to person, place, and time. Mental status is at baseline.      Cranial Nerves: No cranial nerve deficit.      Sensory: No sensory deficit.      Motor: No weakness.      Gait: Gait normal.   Psychiatric:         Mood and Affect: Mood normal.         Behavior:  Behavior normal.         Thought Content: Thought content normal.         Judgment: Judgment normal.         Fluids    Intake/Output Summary (Last 24 hours) at 12/8/2024 2037  Last data filed at 12/8/2024 1800  Gross per 24 hour   Intake 240 ml   Output 1550 ml   Net -1310 ml        Laboratory  Recent Labs     12/06/24  0210 12/07/24  0914 12/08/24  0405   WBC 6.5 16.7* 12.3*   RBC 3.24* 3.74* 3.43*   HEMOGLOBIN 10.0* 11.7* 10.7*   HEMATOCRIT 30.4* 34.3* 32.2*   MCV 93.8 91.7 93.9   MCH 30.9 31.3 31.2   MCHC 32.9 34.1 33.2   RDW 44.0 43.1 44.1   PLATELETCT 149* 163* 143*   MPV 10.8 10.8 11.0     Recent Labs     12/06/24  0210 12/07/24  0914 12/08/24  0405   SODIUM 141 146* 139   POTASSIUM 4.3 3.5* 3.8   CHLORIDE 109 108 106   CO2 24 23 24   GLUCOSE 111* 108* 130*   BUN 43* 21 23*   CREATININE 1.40 1.06 1.08   CALCIUM 8.5 8.4 8.9                   Imaging  EC-ECHOCARDIOGRAM COMPLETE W/O CONT   Final Result      DX-CHEST-PORTABLE (1 VIEW)   Final Result      Linear atelectasis within the left lung.      CT-ABDOMEN-PELVIS W/O   Final Result      1.  Prior colectomy with left lower quadrant ostomy site.      2.  No evidence of bowel obstruction or focal inflammatory change.      3.  Linear atelectasis versus scarring within the left lung base.           Assessment/Plan  * FLORES (acute kidney injury) (HCC)- (present on admission)  Assessment & Plan  Mostly due to dehydration   I will start intravenous fluids  Avoid / minimize nephrotoxins as much as possible.  Monitor inputs and outputs  I will hold home furosemide for now, given her acute kidney injury and dehydration     Easy bruising- (present on admission)  Assessment & Plan  Platelet count appear within normal limits.  Hemoglobin appears stable.  I will monitor hemoglobin and platelet count with daily labs.     ACP (advance care planning)- (present on admission)  Assessment & Plan  I had a discussion with the patient regarding goals of care, diagnoses, prognosis, and  CODE STATUS.  We discussed her diagnoses, prognosis and comorbidities. The patient has advanced age of 69 years.  She has a number of chronic medical problems including chronic disease, chronic heart failure, and is presenting with intractable nausea and vomiting with severe dehydration.  However the patient enjoys a very good cognitive and functional capacity.  She hopes to live another 30 years.  At this point the patient wants to maintain a full code.  She is open to all forms of invasive or noninvasive diagnostic and therapeutic interventions.      Intractable nausea and vomiting- (present on admission)  Assessment & Plan  We will check CT abdomen pelvis for further evaluation.  Bowel rest with supportive care and a modified diet  Intravenous fluids.  I will check gastrointestinal PCR panel  CT abdomen shows prior colectomy with left lower quadrant ostomy site. No evidence of bowel obstruction or focal inflammatory change.   Antiemetics as needed    Increased ostomy output- (present on admission)  Assessment & Plan  We will check CT abdomen pelvis for further evaluation.  Bowel rest with supportive care and a modified diet  Intravenous fluids.  I will check gastrointestinal PCR panel  CT abdomen shows prior colectomy with left lower quadrant ostomy site. No evidence of bowel obstruction or focal inflammatory change.     Metabolic acidosis- (present on admission)  Assessment & Plan  Likely due to reduced intravascular volume and increase bicarb losses through increased ostomy output  I will start intravenous fluids and bicarb  Anticipate improvement with intravenous fluids  Continue to monitor, I will order follow-up bicarb level         Chronic diastolic congestive heart failure (HCC)- (present on admission)  Assessment & Plan  Not currently in exacerbation but is at risk for so.  I will start amlodipine and metoprolol with hold parameters  I will hold home furosemide for now, given her acute kidney injury and  dehydration      Essential hypertension- (present on admission)  Assessment & Plan  I will start amlodipine and metoprolol with hold parameters  I will hold home furosemide for now, given her acute kidney injury and dehydration    Dehydration- (present on admission)  Assessment & Plan  Likely secondary to reduced oral intake, and increase in ostomy output  I will hold home furosemide for now   Encourage oral intake as tolerated, antiemetics as needed.  Intravenous hydration until adequate oral intake is achieved     Atrial fibrillation (HCC)- (present on admission)  Assessment & Plan  Does not tolerate anticoagulation due to easy bleeding  I will resume home metoprolol with hold parameters    GERD (gastroesophageal reflux disease)- (present on admission)  Assessment & Plan  I will start Tums as needed     Crohn's disease of small intestine with complication (HCC)- (present on admission)  Assessment & Plan  Status post colectomy.  Now has ileostomy          VTE prophylaxis:   SCDs/TEDs      I have performed a physical exam and reviewed and updated ROS and Plan today (12/8/2024). In review of yesterday's note (12/7/2024), there are no changes except as documented above.  Total time spent 53 minutes. I spent greater than 50% of the time for patient care, counseling, and coordination on this date, including unit/floor time, and face-to-face time with the patient as per interval events, my own review of patient's imaging and lab analysis and developing my assessment and plan above.

## 2024-12-09 NOTE — CARE PLAN
The patient is Stable - Low risk of patient condition declining or worsening    Shift Goals  Clinical Goals: Monitor labs and vitals; pain management  Patient Goals: rest and comfort  Family Goals: DALTON    Progress made toward(s) clinical / shift goals: Patient up and ambulating to the hallway. Patient completed self-care for a bed bath and ostomy care. MD assessed and consulted with patient at bedside. Patient managing pain with pharmacological and non-pharmacological methods. Patient consulted with and assessed with wound care specialist.      Problem: Pain - Standard  Goal: Alleviation of pain or a reduction in pain to the patient’s comfort goal  Outcome: Progressing  Note: Patient verbalizes pain using 0-10 scale. Patient uses pharmacological and non-pharmacological methods of pain management.       Problem: Knowledge Deficit - Standard  Goal: Patient and family/care givers will demonstrate understanding of plan of care, disease process/condition, diagnostic tests and medications  Outcome: Progressing  Note: Patient verbalizes understanding of plan of care and barriers to discharge. Patient asks appropriate questions about plan of care.       Problem: Fall Risk  Goal: Patient will remain free from falls  Outcome: Progressing  Note: Fall precautions are in place. Patient uses call light appropriately.         Patient is not progressing towards the following goals:

## 2024-12-10 ENCOUNTER — PATIENT OUTREACH (OUTPATIENT)
Dept: MEDICAL GROUP | Facility: LAB | Age: 69
End: 2024-12-10
Payer: MEDICARE

## 2024-12-10 NOTE — PROGRESS NOTES
This RN discussed all discharge paper work and follow up information with patient with no further questions. Pt discharged and brought down to cab by wheelchair and all belongings by charge RN.

## 2024-12-10 NOTE — PROGRESS NOTES
Transitional Care Management  TCM Outreach Date and Time: Filed (12/10/2024  9:41 AM)    Discharge Questions  Actual Discharge Date: 12/09/24  Now that you are home, how are you feeling?: Poor (Tearful and related how difficult is has been to be ill since she was 12. Has a sore throat since her EGD procedure/is using medications/ice. Upset sent home with milton and no plan of care. She is seeing a Urologist 12/11.  Is tired and will rest today.)  Did you receive any new prescriptions?: Yes (Oxybutynin, delsym, furosemide)  Were you able to get them filled?: Yes  Meds to Bed or Pharmacy filled?: Pharmacy  Do you have any questions about your current medications or new medications (Review Med Rec)?: No (Med Rec completed.  She is aware of her medications and returned to her scheduled meds.)  Did you have any durable medical equipment ordered?: No  Do you have a follow up appointment scheduled with your PCP?: Yes  Appointment Date: 12/12/24  Appointment Time: 1320  Any issues or paperwork you wish to discuss with your PCP?: Yes (Please specify) (What to do with milton going forward.)  Are you (patient) able to get to the appointment?: Yes  If Home Health was ordered, have they contacted you (Patient): Not Applicable  Did you have enough support after your last discharge?: Yes ( and family)  Does this patient qualify for the CCM program?: No    Transitional Care  Number of attempts made to contact patient: 1  Current or previous attempts completed within two business days of discharge? : Yes  Provided education regarding treatment plan, medications, self-management, ADLs?: Yes (Discussed how to care for sore throat)  Has patient completed an Advanced Directive?: No  Has the Care Manager's phone number provided?: No  Is there anything else I can help you with?: No    Discharge Summary  Chief Complaint: N/V; liquid stool per ileostomy  Admitting Diagnosis: FLORES; Dehydration; N/V/D; HX:  Crohn's; GERD; Ileostomy; Afib;  CHF; HTN; AKD  Discharge Diagnosis: Intractable N/V; [place] acidosis/dehydration; 12/7/24 EGD with botox injections for Achalasia

## 2024-12-10 NOTE — WOUND TEAM
"  [facility]     Inpatient Services     Established Ostomy Management/ troubleshooting      Plan: Bedside RNs to assist pt with appliance changes. Ostomy RN to remain available PRN for any needs.    HPI: Reviewed  PMH: Reviewed   SH: Reviewed     Reason for Ostomy nurse consult:  Established ileostomy    Ostomy History: Pt has had a permanent ileostomy for 28 years. She requested consult r/t discomfort and small red protrusion medial stoma side.       Ileostomy  LLQ (Active)   Stomal Appliance Assessment Intact   Stoma Assessment Beefy red   Stoma Shape Oval   Stoma Size (in) 1.2   Peristomal Assessment Intact   Mucocutaneous Junction Intact   Treatment Cleansed with water/washcloth;Appliance Changed   Peristomal Protectant Paste Ring   Stomal Appliance 2 1/4\" (57mm) CTF;Paste Ring, 2\"   Output Color Yellow   WOUND RN ONLY - Stomal Appliance  2 Piece;2 1/4\" (57mm) CTF;Transparent Pouch Lock & Roll;Paste Ring, 2\"   Appliance (Pouch) # [id number], barrier [id number], [place] [id number]   Appliance Brand Market Track   Appliance Supplier [employer]              Interventions and Education (if needed): Discussed getting samples from [employer] and pt given supply numbers of pieces used today.     Evaluation: Pt pre-cut barrier starting to indent inferior edge of stoma causing pain r/t size having changed r/t some increase in weight.      Anticipated discharge needs: Pt contacting [employer] for some samples.   "

## 2024-12-12 ENCOUNTER — OFFICE VISIT (OUTPATIENT)
Dept: MEDICAL GROUP | Facility: LAB | Age: 69
End: 2024-12-12
Payer: MEDICARE

## 2024-12-12 ENCOUNTER — PATIENT MESSAGE (OUTPATIENT)
Dept: MEDICAL GROUP | Facility: LAB | Age: 69
End: 2024-12-12

## 2024-12-12 VITALS
WEIGHT: 129.63 LBS | HEART RATE: 99 BPM | SYSTOLIC BLOOD PRESSURE: 120 MMHG | TEMPERATURE: 97.7 F | BODY MASS INDEX: 17.58 KG/M2 | DIASTOLIC BLOOD PRESSURE: 60 MMHG | OXYGEN SATURATION: 98 %

## 2024-12-12 DIAGNOSIS — K22.0 ACHALASIA: ICD-10-CM

## 2024-12-12 DIAGNOSIS — Z93.2 ILEOSTOMY IN PLACE (HCC): ICD-10-CM

## 2024-12-12 DIAGNOSIS — F32.89 OTHER DEPRESSION: ICD-10-CM

## 2024-12-12 DIAGNOSIS — K50.019 CROHN'S DISEASE OF SMALL INTESTINE WITH COMPLICATION (HCC): ICD-10-CM

## 2024-12-12 ASSESSMENT — FIBROSIS 4 INDEX: FIB4 SCORE: 2.75

## 2024-12-12 NOTE — PROGRESS NOTES
CC: Jana Overton is a 69 y.o. female is suffering from   Chief Complaint   Patient presents with    [facility] Follow-[facility]         SUBJECTIVE:  1. Jagjitalasia  Jana is here for follow-up, suffers from severe achalasia, has had problems with weight loss associated with severe esophagitis, status post EGD with Botox injection.  Patient also has experienced weight loss    2. Crohn's disease of small intestine with complication (HCC)  Patient with a history of Crohn's disease with colostomy after complete colectomy    3. Ileostomy in place (HCC)  Ileostomy in place supplies will likely need to be ordered        Past social, family, history: , Goyo, estranged  Social History     Tobacco Use    Smoking status: Never    Smokeless tobacco: Never    Tobacco comments:     second hand smoke parents - smoked for only 1 year many years ago   Vaping Use    Vaping status: Every Day    Substances: THC   Substance Use Topics    Alcohol use: Not Currently     Alcohol/week: 0.6 oz     Types: 1 Shots of liquor per week     Comment: gin and half a lime; tonic water    Drug use: Not Currently     Types: Marijuana, Inhaled     Comment: medical marijuana through bong/vape         MEDICATIONS:    Current Outpatient Medications:     furosemide (LASIX) 20 MG Tab, Take 1 Tablet by mouth 1 time a day as needed (swelling)., Disp: 30 Tablet, Rfl: 0    Dextromethorphan Polistirex ER (DELSYM) 30 MG/5ML Suspension Extended Release, Take 10 mL by mouth 2 times a day., Disp: 280 mL, Rfl: 0    oxybutynin (DITROPAN) 5 MG Tab, Take 1 Tablet by mouth 3 times a day., Disp: 30 Tablet, Rfl: 0    Ascorbic Acid (VITAMIN C GUMMIE PO), Take 2 Tablets by mouth every day., Disp: , Rfl:     Biotin-Vitamin C (HAIR SKIN NAILS GUMMIES PO), Take 2 Tablets by mouth every day., Disp: , Rfl:     Ca Phosphate-Cholecalciferol (CALCIUM/VITAMIN D3 GUMMIES PO), Take 2 Tablets by mouth every day., Disp: , Rfl:     POTASSIUM CITRATE PO, Take  2 Tablets by mouth every day. Gummy, Disp: , Rfl:     Multiple Vitamins-Minerals (MULTI-VITAMIN GUMMIES PO), Take 2 Tablets by mouth every day., Disp: , Rfl:     Probiotic Product (PROBIOTIC GUMMIES PO), Take 2 Tablets by mouth every day., Disp: , Rfl:     cetirizine (ZYRTEC) 10 MG Tab, Take 10 mg by mouth every day., Disp: , Rfl:     guaiFENesin (MUCINEX PO), Take 1 Tablet by mouth every day., Disp: , Rfl:     prochlorperazine (COMPAZINE) 10 MG Tab, Take 1 Tablet by mouth every 6 hours as needed for Nausea/Vomiting., Disp: 30 Tablet, Rfl: 3    nystatin (MYCOSTATIN) 350554 UNIT/ML Suspension, Take 5 mL by mouth 4 times a day., Disp: 60 mL, Rfl: 0    zolpidem (AMBIEN) 10 MG Tab, Take 1 Tablet by mouth at bedtime as needed for Sleep for up to 90 days., Disp: 30 Tablet, Rfl: 2    Azelaic Acid 15 % Gel, Apply 1 Application topically every day. Apply's on face, Disp: , Rfl:     [START ON 2/17/2025] oxyCODONE immediate release (ROXICODONE) 10 MG immediate release tablet, Take 1 Tablet by mouth every 6 hours as needed for Moderate Pain (Refill #3 of #3) for up to 30 days., Disp: 90 Tablet, Rfl: 0    [START ON 1/18/2025] oxyCODONE immediate release (ROXICODONE) 10 MG immediate release tablet, Take 1 Tablet by mouth every four hours as needed for Moderate Pain (Refill #2 of #3) for up to 30 days., Disp: 90 Tablet, Rfl: 0    [START ON 12/19/2024] oxyCODONE immediate release (ROXICODONE) 10 MG immediate release tablet, Take 1 Tablet by mouth every 6 hours as needed for Moderate Pain (refill #1 of #3) for up to 30 days., Disp: 90 Tablet, Rfl: 0    amLODIPine (NORVASC) 2.5 MG Tab, Take 1 Tablet by mouth every day., Disp: 90 Tablet, Rfl: 3    spironolactone (ALDACTONE) 25 MG Tab, Take 1 Tablet by mouth every day. **LAST SEEN 12/2023 .  TO ENSURE FURTHER REFILLS, KEEP SCHEDULED FOLLOW UP VISIT 11/27/2024.**, Disp: 90 Tablet, Rfl: 0    traZODone (DESYREL) 50 MG Tab, TAKE 1 TABLET BY MOUTH EVERY EVENING, Disp: 90 Tablet, Rfl: 3     cetirizine (ZYRTEC) 1 MG/ML Solution oral solution, Take 10 mL by mouth every day., Disp: 150 mL, Rfl: 3    potassium chloride SA (KDUR) 20 MEQ Tab CR, Take 1 Tablet by mouth 2 times a day., Disp: 180 Tablet, Rfl: 1    hydroxychloroquine (PLAQUENIL) 200 MG Tab, Take 1 Tablet by mouth every day., Disp: 90 Tablet, Rfl: 3    ondansetron (ZOFRAN ODT) 4 MG TABLET DISPERSIBLE, Take 1 Tablet by mouth every 6 hours as needed for Nausea/Vomiting., Disp: 20 Tablet, Rfl: 5    metoprolol tartrate (LOPRESSOR) 50 MG Tab, Take 1 Tablet by mouth 2 times a day., Disp: 180 Tablet, Rfl: 3    levalbuterol (XOPENEX HFA) 45 MCG/ACT inhaler, Inhale 2 Puffs every four hours as needed for Shortness of Breath (As needed for shortness of breath, cough, wheezing.)., Disp: 1 Each, Rfl: 11    naratriptan (AMERGE) 2.5 MG tablet, Take 1 Tablet by mouth as needed for Migraine., Disp: 5 Tablet, Rfl: 3    PROBLEMS:  Patient Active Problem List    Diagnosis Date Noted    FLORES (acute kidney injury) (HCC) 12/04/2024    Metabolic acidosis 12/04/2024    Increased ostomy output 12/04/2024    Intractable nausea and vomiting 12/04/2024    ACP (advance care planning) 12/04/2024    Easy bruising 12/04/2024    Underweight (BMI < 18.5) 11/27/2024    Thrombocytopenia (HCC) 12/11/2023    Chronic diastolic congestive heart failure (HCC) 12/11/2023    Chronic kidney disease, stage 3b 11/22/2023    Transient alteration of awareness 10/26/2023    Achalasia 10/18/2023    Esophageal candidiasis (HCC) 02/08/2023    Sacroiliac joint pain 11/10/2022    Esophageal stricture 10/22/2022    Dysphagia 10/21/2022    Drug-induced lupus erythematosus 09/27/2022    Long-term use of hydroxychloroquine 09/27/2022    Fibromyalgia syndrome 09/27/2022    Primary osteoarthritis of both hands 07/28/2022    Positive cardiolipin antibodies (IgA and IgG anti-CL) 07/27/2022    Positive VAHID (1:640 homogenous pattern with negative reflex) 07/27/2022    Inflammatory arthritis 07/27/2022    Mild  tetrahydrocannabinol (THC) abuse 06/02/2022    Oropharyngeal dysphagia 06/02/2022    Acute pancreatitis 05/23/2022    Migraine 06/04/2019    Essential hypertension 05/20/2019    SIRS (systemic inflammatory response syndrome) ([facility]) 05/19/2019    History of MRSA infection 12/29/2018    History of infection with vancomycin resistant Enterococcus (VRE) 12/29/2018    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[facility]) 12/28/2018    Anemia 12/15/2018    Dehydration 12/12/2018    Hyponatremia 12/12/2018    Leukocytosis 12/12/2018    Anxiety disorder due to multiple medical problems 12/01/2017    DDD (degenerative disc disease), lumbar 04/12/2017    History of DVT (deep vein thrombosis) 11/23/2016    Atrial fibrillation ([facility]) 03/26/2015    Sleep apnea 10/26/2014    Postherpetic neuralgia 06/24/2014    Multiple falls 06/24/2014    COPD (chronic obstructive pulmonary disease) ([facility]) 06/24/2014    Rosacea 06/24/2014    Ileostomy in place ([facility]) 06/26/2013    GERD (gastroesophageal reflux disease) 12/05/2012    Status post lumbar surgery 07/16/2012    Crohn's disease of small intestine with complication ([facility]) 09/23/2009    Central sensitization to pain 09/23/2009    Opioid type dependence, continuous (CMS-[facility]) 09/23/2009    Degenerative joint disease of cervical and lumbar spine 09/23/2009       REVIEW OF SYSTEMS:  Gen.:  No Nausea, Vomiting, fever, Chills.  Heart: No chest pain.  Lungs:  No shortness of Breath.  Psychological: Rg unusual Anxiety depression     PHYSICAL EXAM      Constitutional: Alert, cooperative, not in acute distress.  Cardiovascular:  Rate Rhythm is regular without murmurs rubs clicks.     Thorax & Lungs: Clear to auscultation, no wheezing, rhonchi, or rales  HENT: Normocephalic, Atraumatic.  Eyes: PERRLA, EOMI, Conjunctiva normal.   Neck: Trachia is midline no swelling of the thyroid.   Lymphatic: No lymphadenopathy noted.   Musculoskeletal: Patient with adherent buttock protector secondary to risk for sacral decubitus  ulcer  Neurologic: Alert & oriented x 3, cranial nerves II through XII are intact, Normal motor function, Normal sensory function, No focal deficits noted.   Psychiatric: Affect normal, Judgment normal, Mood normal.     VITAL SIGNS:/60 (BP Location: Right arm, Patient Position: Sitting, BP Cuff Size: Adult)   Pulse 99   Temp 36.5 °C (97.7 °F) (Temporal)   Wt 58.8 kg (129 lb 10.1 oz)   SpO2 98%   BMI 17.58 kg/m²     Labs: Reviewed    Assessment:                                                     Plan:     1. Achalasia  Achalasia continued findings, suspect patient will need to follow-up with [facility]    2. Crohn's disease of small intestine with complication (HCC)  Crohn's disease  - CBC WITH DIFFERENTIAL; Future  - Comp Metabolic Panel; Future    3. Ileostomy in place (HCC)  No change in medical therapy medical supplies pending  - CBC WITH DIFFERENTIAL; Future  - Comp Metabolic Panel; Future      /60 (BP Location: Right arm, Patient Position: Sitting, BP Cuff Size: Adult)   Pulse 99   Temp 36.5 °C (97.7 °F) (Temporal)   Wt 58.8 kg (129 lb 10.1 oz)   SpO2 98%   BMI 17.58 kg/m²   Bum bag heel bum pads ostomy samples

## 2024-12-13 RX ORDER — TRAZODONE HYDROCHLORIDE 100 MG/1
100 TABLET ORAL EVERY EVENING
Qty: 90 TABLET | Refills: 3 | Status: SHIPPED | OUTPATIENT
Start: 2024-12-13

## 2024-12-15 ENCOUNTER — HOSPITAL ENCOUNTER (EMERGENCY)
Facility: MEDICAL CENTER | Age: 69
End: 2024-12-15
Attending: EMERGENCY MEDICINE
Payer: MEDICARE

## 2024-12-15 VITALS
TEMPERATURE: 98.8 F | DIASTOLIC BLOOD PRESSURE: 123 MMHG | SYSTOLIC BLOOD PRESSURE: 171 MMHG | WEIGHT: 131 LBS | BODY MASS INDEX: 17.74 KG/M2 | HEIGHT: 72 IN | RESPIRATION RATE: 20 BRPM | HEART RATE: 89 BPM | OXYGEN SATURATION: 97 %

## 2024-12-15 DIAGNOSIS — N30.91 HEMORRHAGIC CYSTITIS: ICD-10-CM

## 2024-12-15 LAB
APPEARANCE UR: CLEAR
BACTERIA #/AREA URNS HPF: ABNORMAL /HPF
BILIRUB UR QL STRIP.AUTO: NEGATIVE
CASTS URNS QL MICRO: ABNORMAL /LPF (ref 0–2)
COLOR UR: YELLOW
EPITHELIAL CELLS 1715: ABNORMAL /HPF (ref 0–5)
GLUCOSE UR STRIP.AUTO-MCNC: NEGATIVE MG/DL
KETONES UR STRIP.AUTO-MCNC: NEGATIVE MG/DL
LEUKOCYTE ESTERASE UR QL STRIP.AUTO: ABNORMAL
MICRO URNS: ABNORMAL
NITRITE UR QL STRIP.AUTO: POSITIVE
PH UR STRIP.AUTO: 7 [PH] (ref 5–8)
PROT UR QL STRIP: 30 MG/DL
RBC # URNS HPF: ABNORMAL /HPF
RBC UR QL AUTO: ABNORMAL
SP GR UR STRIP.AUTO: 1.02
WBC #/AREA URNS HPF: ABNORMAL /HPF

## 2024-12-15 PROCEDURE — 81001 URINALYSIS AUTO W/SCOPE: CPT

## 2024-12-15 PROCEDURE — 700111 HCHG RX REV CODE 636 W/ 250 OVERRIDE (IP)

## 2024-12-15 PROCEDURE — 303105 HCHG CATHETER EXTRA

## 2024-12-15 PROCEDURE — 99284 EMERGENCY DEPT VISIT MOD MDM: CPT

## 2024-12-15 RX ORDER — ONDANSETRON 4 MG/1
4 TABLET, ORALLY DISINTEGRATING ORAL ONCE
Status: DISCONTINUED | OUTPATIENT
Start: 2024-12-15 | End: 2024-12-15 | Stop reason: HOSPADM

## 2024-12-15 RX ORDER — ONDANSETRON 4 MG/1
4 TABLET, ORALLY DISINTEGRATING ORAL EVERY 4 HOURS PRN
Status: DISCONTINUED | OUTPATIENT
Start: 2024-12-15 | End: 2024-12-15 | Stop reason: HOSPADM

## 2024-12-15 RX ORDER — AMOXICILLIN 400 MG/5ML
400 POWDER, FOR SUSPENSION ORAL 2 TIMES DAILY
Qty: 50 ML | Refills: 0 | Status: ACTIVE | OUTPATIENT
Start: 2024-12-15 | End: 2024-12-20

## 2024-12-15 RX ADMIN — ONDANSETRON 4 MG: 4 TABLET, ORALLY DISINTEGRATING ORAL at 12:37

## 2024-12-15 RX ADMIN — ONDANSETRON 4 MG: 4 TABLET, ORALLY DISINTEGRATING ORAL at 13:55

## 2024-12-15 ASSESSMENT — FIBROSIS 4 INDEX: FIB4 SCORE: 2.75

## 2024-12-15 ASSESSMENT — PAIN DESCRIPTION - PAIN TYPE: TYPE: ACUTE PAIN

## 2024-12-15 NOTE — ED NOTES
Attempted to review discharge instructions with patient, patient states she is not ready to go over information at this time. Patient had concerns about anti nausea medication and urology follow up. ERP updated.

## 2024-12-15 NOTE — ED TRIAGE NOTES
Chief Complaint   Patient presents with    Urinary Retention     Patient BIB EMS for feeling urinary retention over the past 2 days. Patient had a milton placed on Wednesday by outpatient urology, chronic ostomy of 28 years. Patient reports bilateral flank pain and generalized aches. Patient is ambulatory and AA&Ox4.    Urinary Catheter Problem     Dark yellow urine output observed in milton leg bag.      BP (!) 167/79   Pulse 87   Temp 37.1 °C (98.7 °F) (Temporal)   Resp 18   Ht 1.829 m (6')   Wt 59.4 kg (131 lb)   SpO2 99%   BMI 17.77 kg/m²      Patient reports taking zofran 8 mg around 0300 today.

## 2024-12-15 NOTE — ED PROVIDER NOTES
ED Provider Note    CHIEF COMPLAINT  Chief Complaint   Patient presents with    Urinary Retention     Patient BIB EMS for feeling urinary retention over the past 2 days. Patient had a milton placed on Wednesday by outpatient urology, chronic ostomy of 28 years. Patient reports bilateral flank pain and generalized aches. Patient is ambulatory and AA&Ox4.    Urinary Catheter Problem     Dark yellow urine output observed in milton leg bag.         HPI/ROS    Jana Overton is a 69 y.o. female who presents with concerns for urinary retention.  The patient says she had a Milton catheter placed by urology on Wednesday.  She has a complex history secondary to Crohn's disease and also has a colostomy.  She states she has had poor output from her Milton catheter and it appears very dark and yellow and she was concerned.  Therefore she presents for examination.  She is also had some generalized myalgias as well as flank plain.    PAST MEDICAL HISTORY   has a past medical history of Anesthesia, Anxiety, Arthritis, ASTHMA, Atrial fib/flut, Backpain, Bronchitis, Chronic pain, Colostomy in place (HCC), COPD (chronic obstructive pulmonary disease) (HCC), Crohn's disease of colon (HCC), Dyspnea, History of cardiac murmur, Ileostomy present (HCC), Infectious disease, Multiple falls, Narcotic dependence (HCC), Obstruction, Pain, Pneumonia, Postherpetic neuralgia, Rosacea, and Sleep apnea.    SURGICAL HISTORY   has a past surgical history that includes lumbar fusion anterior; cervical disk and fusion anterior; foot surgery; hand surgery; other abdominal surgery; gastroscopy with biopsy (11/22/08); ileostomy (11/11/2009); dilation and curettage (9/24/2010); gastroscopy (10/4/2011); colonoscopy (10/4/2011); hardware removal neuro (7/16/2012); mammoplasty reduction (7/17/2013); ileostomy (5/14/2014); breast reduction; abdominal exploration; lumpectomy; colon resection; ileostomy (12/29/2018); sigmoidoscopy flex (12/29/2018); exploratory  laparotomy (12/29/2018); gastroscopy (1/6/2019); gastroscopy (N/A, 5/22/2019); ileostomy (1/20/2021); lysis adhesions general (1/20/2021); cysto/uretero/pyeloscopy, dx (Right, 12/8/2021); cystoscopy with ureteral stent insertion or removal (Right, 12/8/2021); cystoscopy,insert ureteral stent (Right, 12/23/2021); cysto/uretero/pyeloscopy, dx (Right, 12/23/2021); upper gi endoscopy,diagnosis (N/A, 10/24/2022); biliary dilatation (N/A, 10/24/2022); upper gi endoscopy,diagnosis (N/A, 1/16/2023); upper gi endoscopy,w/dilat,gastric out (N/A, 1/16/2023); upper gi endoscopy,biopsy (N/A, 1/16/2023); upper gi endoscopy,diagnosis (N/A, 2/8/2023); upper gi endoscopy,w/dilat,gastric out (N/A, 2/8/2023); upper gi endoscopy,biopsy (N/A, 2/8/2023); and upper gi endoscopy,diagnosis (N/A, 12/7/2024).    FAMILY HISTORY  Family History   Problem Relation Age of Onset    Heart Disease Mother         Angina, MI later in life    Lung Disease Mother         copd    Diabetes Father     Heart Disease Father     Diabetes Sister     Cancer Maternal Aunt 40        breast       SOCIAL HISTORY  Social History     Tobacco Use    Smoking status: Never    Smokeless tobacco: Never    Tobacco comments:     second hand smoke parents - smoked for only 1 year many years ago   Vaping Use    Vaping status: Every Day    Substances: THC   Substance and Sexual Activity    Alcohol use: Not Currently     Alcohol/week: 0.6 oz     Types: 1 Shots of liquor per week     Comment: gin and half a lime; tonic water    Drug use: Not Currently     Types: Marijuana, Inhaled     Comment: medical marijuana through bong/vape    Sexual activity: Not on file     Comment:        CURRENT MEDICATIONS  Home Medications    **Home medications have not yet been reviewed for this encounter**         ALLERGIES  Allergies   Allergen Reactions    Demerol Hcl [Meperidine] Shortness of Breath and Palpitations    Methotrexate Hives, Shortness of Breath and Rash          Morphine  "Shortness of Breath and Palpitations    Promethazine Shortness of Breath and Rash          Remicade [Infliximab] Hives, Shortness of Breath and Rash          Sudafed [Pseudoephedrine] Shortness of Breath, Vomiting and Palpitations    Azathioprine Sodium Hives and Shortness of Breath     10/21/2022 - patient unable to verify allergy/reaction  Historical reaction listed: Hives, Shortness of Breath    Carafate [Sucralfate] Vomiting and Nausea     + Mouth Sores    Other Drug Unspecified     \"STEROIDS\" - REACTION NOT SPECIFIED    Sulfa Drugs Rash          Sulfasalazine Rash          Gabapentin Cough, Hives, Itching, Nausea, Palpitations, Photosensitivity, Rash, Runny Nose, Swelling, Unspecified and Vomiting    Nitroglycerin      Headache    Prednisone      Other Reaction(s): Reaction:GI system trouble    Amitriptyline Unspecified     Nightmares      Ativan [Lorazepam] Unspecified     Nightmares    Betamethasone Unspecified     10/21/2022 - patient unable to verify allergy/reaction  No historical reaction listed    Fentanyl Unspecified     \"Patches didn't work\" and skin broke down    Lyrica [Pregabalin] Nausea          Methadone Unspecified     \"Didn't work\"    Potassium Unspecified     Notes: In IV only  REACTION NOT SPECIFIED    Tizanidine Unspecified     10/21/2022 - patient unable to verify allergy/reaction  No historical reaction listed       PHYSICAL EXAM  VITAL SIGNS: BP (!) 167/79   Pulse 87   Temp 37.1 °C (98.7 °F) (Temporal)   Resp 18   Ht 1.829 m (6')   Wt 59.4 kg (131 lb)   SpO2 99%   BMI 17.77 kg/m²    General The patient appears chronically ill but no acute distress    Pulmonary the patient's lungs are clear to auscultation bilaterally    Cardiovascular S1-S2 with a regular rate and rhythm    GI the patient has a colostomy in place with good bowel sounds    Skin no pallor    Neurologic examination is grossly intact    EKG/LABS  Results for orders placed or performed during the hospital encounter of " 12/15/24   URINALYSIS    Collection Time: 12/15/24 12:52 PM    Specimen: Urine, Sage Cath   Result Value Ref Range    Color Yellow     Character Clear     Specific Gravity 1.025 <1.035    Ph 7.0 5.0 - 8.0    Glucose Negative Negative mg/dL    Ketones Negative Negative mg/dL    Protein 30 (A) Negative mg/dL    Bilirubin Negative Negative    Nitrite Positive (A) Negative    Leukocyte Esterase Small (A) Negative    Occult Blood Large (A) Negative    Micro Urine Req Microscopic    URINE MICROSCOPIC (W/UA)    Collection Time: 12/15/24 12:52 PM   Result Value Ref Range    WBC 11-20 (A) /hpf    RBC 6-10 (A) /hpf    Bacteria Moderate (A) None /hpf    Epithelial Cells 0-2 0 - 5 /hpf    Urine Casts 0-2 0 - 2 /lpf     *Note: Due to a large number of results and/or encounters for the requested time period, some results have not been displayed. A complete set of results can be found in Results Review.       I have independently interpreted this EKG      COURSE & MEDICAL DECISION MAKING    This is 69-year-old female who states that she has had decreased output as well as concentrated urine from her Sage catheter.  We did perform a bladder scan and she does not have any further retention and the catheter appears to be in the appropriate location.  Her urinalysis is positive for nitrite, leukocyte esterase, with moderate bacteria and I suspect she could have a urinary tract infection.  Clinically she does not appear septic nor toxic.  She does have multiple allergies can only tolerate liquids and therefore placed the patient on amoxicillin suspension.  I would like the patient to follow-up with her urologist.  She will return to the Emergency Department for fever, increased pain, or vomiting.    The patient is aware she does have some blood in her urine and this needs to be followed up on an outpatient basis.    I will culture the patient's urine.    FINAL DIAGNOSIS  Hemorrhagic cystitis    Disposition  The patient will be  discharged in stable condition     Electronically signed by: Abner Oshea M.D., 12/15/2024 12:16 PM    The patient is aware her blood pressure is elevated needs to be followed up on an outpatient basis

## 2024-12-15 NOTE — ED NOTES
Patient given discharge instructions, verbalized understanding. Rx given for amoxicillin. Patient instructed to follow up with urology. Patient provided education to come to ER if symptoms worsen. Discharged in stable condition, taken out by wheelchair with personal belongings to lobby to wait for transport arranged by patient.

## 2024-12-15 NOTE — DISCHARGE INSTRUCTIONS
Stay well-hydrated.  Follow-up with your urologist.  Return for increased pain, fever, or vomiting    Your blood pressure is elevated needs to be followed up on an outpatient basis

## 2024-12-16 DIAGNOSIS — R39.89 BLADDER PAIN: ICD-10-CM

## 2024-12-16 RX ORDER — OXYBUTYNIN CHLORIDE 5 MG/1
5 TABLET ORAL 3 TIMES DAILY
Qty: 30 TABLET | Refills: 0 | Status: SHIPPED | OUTPATIENT
Start: 2024-12-16

## 2024-12-16 NOTE — TELEPHONE ENCOUNTER
Received request via: Patient    Was the patient seen in the last year in this department? Yes    Does the patient have an active prescription (recently filled or refills available) for medication(s) requested? No    Pharmacy Name: [id number] - [place], [place] - [address] AT [facility] [[id number]]     Does the patient have jail Plus and need 100-day supply? (This applies to ALL medications) Patient does not have SCP

## 2024-12-16 NOTE — TELEPHONE ENCOUNTER
Patient's brother passed away, which was the reason for the trip.  Still would need the medication requested.   12:04 pm CA      Patient came into the office requesting a refill of the above medication as soon as possible.  Patient is leaving for [place] today and needs this today.    She is out of the script.    Pharmacy:  [facility]    [phone]  (home)

## 2024-12-16 NOTE — PROGRESS NOTES
Received request via: Patient    Was the patient seen in the last year in this department? Yes    Does the patient have an active prescription (recently filled or refills available) for medication(s) requested? No    Pharmacy Name: [id number] - [place], [place] - [address] AT [facility] [[id number]]     Does the patient have residential Plus and need 100-day supply? (This applies to ALL medications) Patient does not have SCP

## 2024-12-17 DIAGNOSIS — B37.81 ESOPHAGEAL CANDIDIASIS (HCC): ICD-10-CM

## 2024-12-17 RX ORDER — NYSTATIN 100000 [USP'U]/ML
500000 SUSPENSION ORAL 4 TIMES DAILY
Qty: 60 ML | Refills: 0 | Status: SHIPPED | OUTPATIENT
Start: 2024-12-17 | End: 2024-12-25 | Stop reason: SDUPTHER

## 2024-12-17 RX ORDER — NARATRIPTAN 2.5 MG/1
2.5 TABLET ORAL PRN
Qty: 5 TABLET | Refills: 3 | Status: SHIPPED | OUTPATIENT
Start: 2024-12-17 | End: 2024-12-23 | Stop reason: SDUPTHER

## 2024-12-17 NOTE — TELEPHONE ENCOUNTER
Received request via: Pharmacy    Was the patient seen in the last year in this department? Yes    Does the patient have an active prescription (recently filled or refills available) for medication(s) requested? No    Pharmacy Name:   [facility]        Does the patient have correction Plus and need 100-day supply? (This applies to ALL medications) Patient does not have SCP

## 2024-12-17 NOTE — TELEPHONE ENCOUNTER
Received request via: Pharmacy    Was the patient seen in the last year in this department? Yes    Does the patient have an active prescription (recently filled or refills available) for medication(s) requested? No    Pharmacy Name:   [id number] - [place], [place] - [address] [facility]  [address] [facility] [place] [address]  Phone: [phone] Fax: [phone]           Does the patient have penitentiary Plus and need 100-day supply? (This applies to ALL medications) Patient does not have SCP

## 2024-12-19 ENCOUNTER — HOSPITAL ENCOUNTER (OUTPATIENT)
Dept: LAB | Facility: MEDICAL CENTER | Age: 69
End: 2024-12-19
Attending: INTERNAL MEDICINE
Payer: MEDICARE

## 2024-12-19 ENCOUNTER — OFFICE VISIT (OUTPATIENT)
Dept: MEDICAL GROUP | Facility: LAB | Age: 69
End: 2024-12-19
Payer: MEDICARE

## 2024-12-19 VITALS
TEMPERATURE: 98.4 F | BODY MASS INDEX: 18.42 KG/M2 | SYSTOLIC BLOOD PRESSURE: 122 MMHG | RESPIRATION RATE: 18 BRPM | HEIGHT: 72 IN | OXYGEN SATURATION: 95 % | DIASTOLIC BLOOD PRESSURE: 62 MMHG | HEART RATE: 80 BPM | WEIGHT: 136 LBS

## 2024-12-19 DIAGNOSIS — R07.0 THROAT BURNING: ICD-10-CM

## 2024-12-19 DIAGNOSIS — R51.9 NONINTRACTABLE HEADACHE, UNSPECIFIED CHRONICITY PATTERN, UNSPECIFIED HEADACHE TYPE: ICD-10-CM

## 2024-12-19 DIAGNOSIS — J06.9 UPPER RESPIRATORY TRACT INFECTION, UNSPECIFIED TYPE: ICD-10-CM

## 2024-12-19 LAB
ALBUMIN SERPL BCP-MCNC: 4.2 G/DL (ref 3.2–4.9)
ALBUMIN/GLOB SERPL: 1.4 G/DL
ALP SERPL-CCNC: 119 U/L (ref 30–99)
ALT SERPL-CCNC: 24 U/L (ref 2–50)
ANION GAP SERPL CALC-SCNC: 13 MMOL/L (ref 7–16)
AST SERPL-CCNC: 26 U/L (ref 12–45)
BASOPHILS # BLD AUTO: 0.6 % (ref 0–1.8)
BASOPHILS # BLD: 0.07 K/UL (ref 0–0.12)
BILIRUB SERPL-MCNC: 0.3 MG/DL (ref 0.1–1.5)
BUN SERPL-MCNC: 24 MG/DL (ref 8–22)
CALCIUM ALBUM COR SERPL-MCNC: 9.6 MG/DL (ref 8.5–10.5)
CALCIUM SERPL-MCNC: 9.8 MG/DL (ref 8.5–10.5)
CHLORIDE SERPL-SCNC: 103 MMOL/L (ref 96–112)
CO2 SERPL-SCNC: 24 MMOL/L (ref 20–33)
CREAT SERPL-MCNC: 1.25 MG/DL (ref 0.5–1.4)
EOSINOPHIL # BLD AUTO: 0.2 K/UL (ref 0–0.51)
EOSINOPHIL NFR BLD: 1.8 % (ref 0–6.9)
ERYTHROCYTE [DISTWIDTH] IN BLOOD BY AUTOMATED COUNT: 44.3 FL (ref 35.9–50)
FLUAV RNA SPEC QL NAA+PROBE: NEGATIVE
FLUBV RNA SPEC QL NAA+PROBE: NEGATIVE
GFR SERPLBLD CREATININE-BSD FMLA CKD-EPI: 46 ML/MIN/1.73 M 2
GLOBULIN SER CALC-MCNC: 3.1 G/DL (ref 1.9–3.5)
GLUCOSE SERPL-MCNC: 73 MG/DL (ref 65–99)
HCT VFR BLD AUTO: 37.5 % (ref 37–47)
HGB BLD-MCNC: 11.9 G/DL (ref 12–16)
IMM GRANULOCYTES # BLD AUTO: 0.04 K/UL (ref 0–0.11)
IMM GRANULOCYTES NFR BLD AUTO: 0.4 % (ref 0–0.9)
LYMPHOCYTES # BLD AUTO: 1.83 K/UL (ref 1–4.8)
LYMPHOCYTES NFR BLD: 16.2 % (ref 22–41)
MCH RBC QN AUTO: 30.4 PG (ref 27–33)
MCHC RBC AUTO-ENTMCNC: 31.7 G/DL (ref 32.2–35.5)
MCV RBC AUTO: 95.9 FL (ref 81.4–97.8)
MONOCYTES # BLD AUTO: 0.74 K/UL (ref 0–0.85)
MONOCYTES NFR BLD AUTO: 6.5 % (ref 0–13.4)
NEUTROPHILS # BLD AUTO: 8.45 K/UL (ref 1.82–7.42)
NEUTROPHILS NFR BLD: 74.5 % (ref 44–72)
NRBC # BLD AUTO: 0 K/UL
NRBC BLD-RTO: 0 /100 WBC (ref 0–0.2)
PLATELET # BLD AUTO: 256 K/UL (ref 164–446)
PMV BLD AUTO: 10.9 FL (ref 9–12.9)
POTASSIUM SERPL-SCNC: 4.1 MMOL/L (ref 3.6–5.5)
PROT SERPL-MCNC: 7.3 G/DL (ref 6–8.2)
RBC # BLD AUTO: 3.91 M/UL (ref 4.2–5.4)
RSV RNA SPEC QL NAA+PROBE: NEGATIVE
S PYO DNA SPEC NAA+PROBE: NOT DETECTED
SARS-COV-2 RNA RESP QL NAA+PROBE: NEGATIVE
SODIUM SERPL-SCNC: 140 MMOL/L (ref 135–145)
WBC # BLD AUTO: 11.3 K/UL (ref 4.8–10.8)

## 2024-12-19 PROCEDURE — 0241U POCT CEPHEID COV-2, FLU A/B, RSV - PCR: CPT | Performed by: INTERNAL MEDICINE

## 2024-12-19 PROCEDURE — 3074F SYST BP LT 130 MM HG: CPT | Performed by: INTERNAL MEDICINE

## 2024-12-19 PROCEDURE — 36415 COLL VENOUS BLD VENIPUNCTURE: CPT

## 2024-12-19 PROCEDURE — 3078F DIAST BP <80 MM HG: CPT | Performed by: INTERNAL MEDICINE

## 2024-12-19 PROCEDURE — 80053 COMPREHEN METABOLIC PANEL: CPT

## 2024-12-19 PROCEDURE — 87651 STREP A DNA AMP PROBE: CPT | Performed by: INTERNAL MEDICINE

## 2024-12-19 PROCEDURE — 99213 OFFICE O/P EST LOW 20 MIN: CPT | Performed by: INTERNAL MEDICINE

## 2024-12-19 PROCEDURE — 85025 COMPLETE CBC W/AUTO DIFF WBC: CPT

## 2024-12-19 ASSESSMENT — FIBROSIS 4 INDEX: FIB4 SCORE: 2.75

## 2024-12-20 NOTE — PROGRESS NOTES
CC: Jana Overton is a 69 y.o. female is suffering from   Chief Complaint   Patient presents with    Pharyngitis     Symptoms x4 days    Emesis    Fatigue         SUBJECTIVE:  1. Throat burning  Jana is here for follow-up is having problems with her throat burning which is gone on over 4 days is continue to have problems with emesis also fatigue    2. Upper respiratory tract infection, unspecified type  Upper respiratory tract infection etiology uncertain check for COVID    3. Nonintractable headache, unspecified chronicity pattern, unspecified headache type  Labs ordered        Past social, family, history: , estranged  Social History     Tobacco Use    Smoking status: Never    Smokeless tobacco: Never    Tobacco comments:     second hand smoke parents - smoked for only 1 year many years ago   Vaping Use    Vaping status: Every Day    Substances: THC   Substance Use Topics    Alcohol use: Not Currently     Alcohol/week: 0.6 oz     Types: 1 Shots of liquor per week     Comment: gin and half a lime; tonic water    Drug use: Not Currently     Types: Marijuana, Inhaled     Comment: medical marijuana through bong/vape         MEDICATIONS:    Current Outpatient Medications:     nystatin (MYCOSTATIN) 510398 UNIT/ML Suspension, Take 5 mL by mouth 4 times a day., Disp: 60 mL, Rfl: 0    naratriptan (AMERGE) 2.5 MG tablet, Take 1 Tablet by mouth as needed for Migraine., Disp: 5 Tablet, Rfl: 3    oxybutynin (DITROPAN) 5 MG Tab, Take 1 Tablet by mouth 3 times a day., Disp: 30 Tablet, Rfl: 0    amoxicillin (AMOXIL) 400 MG/5ML suspension, Take 5 mL by mouth 2 times a day for 5 days., Disp: 50 mL, Rfl: 0    traZODone (DESYREL) 100 MG Tab, Take 1 Tablet by mouth every evening., Disp: 90 Tablet, Rfl: 3    furosemide (LASIX) 20 MG Tab, Take 1 Tablet by mouth 1 time a day as needed (swelling)., Disp: 30 Tablet, Rfl: 0    Dextromethorphan Polistirex ER (DELSYM) 30 MG/5ML Suspension Extended Release, Take 10 mL by mouth 2  times a day., Disp: 280 mL, Rfl: 0    Ascorbic Acid (VITAMIN C GUMMIE PO), Take 2 Tablets by mouth every day., Disp: , Rfl:     Biotin-Vitamin C (HAIR SKIN NAILS GUMMIES PO), Take 2 Tablets by mouth every day., Disp: , Rfl:     Ca Phosphate-Cholecalciferol (CALCIUM/VITAMIN D3 GUMMIES PO), Take 2 Tablets by mouth every day., Disp: , Rfl:     POTASSIUM CITRATE PO, Take 2 Tablets by mouth every day. Gummy, Disp: , Rfl:     Multiple Vitamins-Minerals (MULTI-VITAMIN GUMMIES PO), Take 2 Tablets by mouth every day., Disp: , Rfl:     Probiotic Product (PROBIOTIC GUMMIES PO), Take 2 Tablets by mouth every day., Disp: , Rfl:     cetirizine (ZYRTEC) 10 MG Tab, Take 10 mg by mouth every day., Disp: , Rfl:     guaiFENesin (MUCINEX PO), Take 1 Tablet by mouth every day., Disp: , Rfl:     prochlorperazine (COMPAZINE) 10 MG Tab, Take 1 Tablet by mouth every 6 hours as needed for Nausea/Vomiting., Disp: 30 Tablet, Rfl: 3    zolpidem (AMBIEN) 10 MG Tab, Take 1 Tablet by mouth at bedtime as needed for Sleep for up to 90 days., Disp: 30 Tablet, Rfl: 2    Azelaic Acid 15 % Gel, Apply 1 Application topically every day. Apply's on face, Disp: , Rfl:     [START ON 2/17/2025] oxyCODONE immediate release (ROXICODONE) 10 MG immediate release tablet, Take 1 Tablet by mouth every 6 hours as needed for Moderate Pain (Refill #3 of #3) for up to 30 days., Disp: 90 Tablet, Rfl: 0    [START ON 1/18/2025] oxyCODONE immediate release (ROXICODONE) 10 MG immediate release tablet, Take 1 Tablet by mouth every four hours as needed for Moderate Pain (Refill #2 of #3) for up to 30 days., Disp: 90 Tablet, Rfl: 0    oxyCODONE immediate release (ROXICODONE) 10 MG immediate release tablet, Take 1 Tablet by mouth every 6 hours as needed for Moderate Pain (refill #1 of #3) for up to 30 days., Disp: 90 Tablet, Rfl: 0    amLODIPine (NORVASC) 2.5 MG Tab, Take 1 Tablet by mouth every day., Disp: 90 Tablet, Rfl: 3    spironolactone (ALDACTONE) 25 MG Tab, Take 1 Tablet  by mouth every day. **LAST SEEN 12/2023 .  TO ENSURE FURTHER REFILLS, KEEP SCHEDULED FOLLOW UP VISIT 11/27/2024.**, Disp: 90 Tablet, Rfl: 0    cetirizine (ZYRTEC) 1 MG/ML Solution oral solution, Take 10 mL by mouth every day., Disp: 150 mL, Rfl: 3    potassium chloride SA (KDUR) 20 MEQ Tab CR, Take 1 Tablet by mouth 2 times a day., Disp: 180 Tablet, Rfl: 1    hydroxychloroquine (PLAQUENIL) 200 MG Tab, Take 1 Tablet by mouth every day., Disp: 90 Tablet, Rfl: 3    ondansetron (ZOFRAN ODT) 4 MG TABLET DISPERSIBLE, Take 1 Tablet by mouth every 6 hours as needed for Nausea/Vomiting., Disp: 20 Tablet, Rfl: 5    metoprolol tartrate (LOPRESSOR) 50 MG Tab, Take 1 Tablet by mouth 2 times a day., Disp: 180 Tablet, Rfl: 3    levalbuterol (XOPENEX HFA) 45 MCG/ACT inhaler, Inhale 2 Puffs every four hours as needed for Shortness of Breath (As needed for shortness of breath, cough, wheezing.)., Disp: 1 Each, Rfl: 11    PROBLEMS:  Patient Active Problem List    Diagnosis Date Noted    FLORES (acute kidney injury) (HCC) 12/04/2024    Metabolic acidosis 12/04/2024    Increased ostomy output 12/04/2024    Intractable nausea and vomiting 12/04/2024    ACP (advance care planning) 12/04/2024    Easy bruising 12/04/2024    Underweight (BMI < 18.5) 11/27/2024    Thrombocytopenia (HCC) 12/11/2023    Chronic diastolic congestive heart failure (HCC) 12/11/2023    Chronic kidney disease, stage 3b 11/22/2023    Transient alteration of awareness 10/26/2023    Achalasia 10/18/2023    Esophageal candidiasis (HCC) 02/08/2023    Sacroiliac joint pain 11/10/2022    Esophageal stricture 10/22/2022    Dysphagia 10/21/2022    Drug-induced lupus erythematosus 09/27/2022    Long-term use of hydroxychloroquine 09/27/2022    Fibromyalgia syndrome 09/27/2022    Primary osteoarthritis of both hands 07/28/2022    Positive cardiolipin antibodies (IgA and IgG anti-CL) 07/27/2022    Positive VAHID (1:640 homogenous pattern with negative reflex) 07/27/2022     Inflammatory arthritis 07/27/2022    Mild tetrahydrocannabinol (THC) abuse 06/02/2022    Oropharyngeal dysphagia 06/02/2022    Acute pancreatitis 05/23/2022    Migraine 06/04/2019    Essential hypertension 05/20/2019    SIRS (systemic inflammatory response syndrome) ([facility]) 05/19/2019    History of MRSA infection 12/29/2018    History of infection with vancomycin resistant Enterococcus (VRE) 12/29/2018    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[facility]) 12/28/2018    Anemia 12/15/2018    Dehydration 12/12/2018    Hyponatremia 12/12/2018    Leukocytosis 12/12/2018    Anxiety disorder due to multiple medical problems 12/01/2017    DDD (degenerative disc disease), lumbar 04/12/2017    History of DVT (deep vein thrombosis) 11/23/2016    Atrial fibrillation ([facility]) 03/26/2015    Sleep apnea 10/26/2014    Postherpetic neuralgia 06/24/2014    Multiple falls 06/24/2014    COPD (chronic obstructive pulmonary disease) ([facility]) 06/24/2014    Rosacea 06/24/2014    Ileostomy in place ([facility]) 06/26/2013    GERD (gastroesophageal reflux disease) 12/05/2012    Status post lumbar surgery 07/16/2012    Crohn's disease of small intestine with complication ([facility]) 09/23/2009    Central sensitization to pain 09/23/2009    Opioid type dependence, continuous (CMS-HCC) 09/23/2009    Degenerative joint disease of cervical and lumbar spine 09/23/2009       REVIEW OF SYSTEMS:  Gen.:  No Nausea, Vomiting, fever, Chills.  Heart: No chest pain.  Lungs:  No shortness of Breath.  Psychological: Rg unusual Anxiety depression     PHYSICAL EXAM      Constitutional: Alert, cooperative, not in acute distress.  Cardiovascular:  Rate Rhythm is regular without murmurs rubs clicks.     Thorax & Lungs: Clear to auscultation, no wheezing, rhonchi, or rales  HENT: Normocephalic, Atraumatic.  Eyes: PERRLA, EOMI, Conjunctiva normal.   Neck: Trachia is midline no swelling of the thyroid.   Lymphatic: No lymphadenopathy noted.   Neurologic: Alert & oriented x 3, cranial nerves II through XII  are intact, Normal motor function, Normal sensory function, No focal deficits noted.   Psychiatric: Affect normal, Judgment normal, Mood normal.     VITAL SIGNS:/62   Pulse 80   Temp 36.9 °C (98.4 °F) (Temporal)   Resp 18   Ht 1.829 m (6')   Wt 61.7 kg (136 lb)   SpO2 95%   BMI 18.44 kg/m²     Labs: Reviewed    Assessment:                                                     Plan:     1. Throat burning  Rapid strep negative  - POCT CEPHEID GROUP A STREP - PCR  - CBC WITH DIFFERENTIAL; Future  - Comp Metabolic Panel; Future    2. Upper respiratory tract infection, unspecified type  Recheck CBC comprehensive metabolic panel  - POCT CoV-2, Flu A/B, RSV by PCR  - CBC WITH DIFFERENTIAL; Future  - Comp Metabolic Panel; Future    3. Nonintractable headache, unspecified chronicity pattern, unspecified headache type  Etiology of headache is uncertain given patient's history of vomiting questionable electrolyte abnormality  - CBC WITH DIFFERENTIAL; Future  - Comp Metabolic Panel; Future

## 2024-12-23 RX ORDER — NARATRIPTAN 2.5 MG/1
2.5 TABLET ORAL PRN
Qty: 5 TABLET | Refills: 3 | Status: SHIPPED | OUTPATIENT
Start: 2024-12-23 | End: 2024-12-30 | Stop reason: SDUPTHER

## 2024-12-23 NOTE — TELEPHONE ENCOUNTER
Received request via: Pharmacy    Was the patient seen in the last year in this department? Yes    Does the patient have an active prescription (recently filled or refills available) for medication(s) requested? No    Pharmacy Name: [facility]    Does the patient have long-term Plus and need 100-day supply? (This applies to ALL medications) Patient does not have SCP

## 2024-12-25 DIAGNOSIS — I10 ESSENTIAL HYPERTENSION: ICD-10-CM

## 2024-12-25 DIAGNOSIS — B37.81 ESOPHAGEAL CANDIDIASIS (HCC): ICD-10-CM

## 2024-12-26 DIAGNOSIS — K21.9 GERD WITH STRICTURE: ICD-10-CM

## 2024-12-26 DIAGNOSIS — K22.2 GERD WITH STRICTURE: ICD-10-CM

## 2024-12-26 DIAGNOSIS — T78.40XA ALLERGIC REACTION TO DRUG, INITIAL ENCOUNTER: ICD-10-CM

## 2024-12-26 DIAGNOSIS — R11.2 NAUSEA AND VOMITING, UNSPECIFIED VOMITING TYPE: ICD-10-CM

## 2024-12-26 RX ORDER — NYSTATIN 100000 [USP'U]/ML
500000 SUSPENSION ORAL 4 TIMES DAILY
Qty: 60 ML | Refills: 0 | Status: SHIPPED | OUTPATIENT
Start: 2024-12-26

## 2024-12-26 RX ORDER — ONDANSETRON 4 MG/1
4 TABLET, ORALLY DISINTEGRATING ORAL EVERY 6 HOURS PRN
Qty: 20 TABLET | Refills: 0 | Status: SHIPPED | OUTPATIENT
Start: 2024-12-26

## 2024-12-26 RX ORDER — FAMOTIDINE 20 MG/1
20 TABLET, FILM COATED ORAL
Qty: 30 TABLET | Refills: 0 | Status: SHIPPED | OUTPATIENT
Start: 2024-12-26

## 2024-12-26 NOTE — TELEPHONE ENCOUNTER
Received request via: Patient    Was the patient seen in the last year in this department? Yes    Does the patient have an active prescription (recently filled or refills available) for medication(s) requested? No    Pharmacy Name: [facility]     Does the patient have nursing home Plus and need 100-day supply? (This applies to ALL medications) Patient does not have SCP

## 2024-12-27 ENCOUNTER — TELEPHONE (OUTPATIENT)
Dept: MEDICAL GROUP | Facility: LAB | Age: 69
End: 2024-12-27

## 2024-12-27 ENCOUNTER — OFFICE VISIT (OUTPATIENT)
Dept: MEDICAL GROUP | Facility: CLINIC | Age: 69
End: 2024-12-27
Payer: MEDICARE

## 2024-12-27 ENCOUNTER — HOSPITAL ENCOUNTER (OUTPATIENT)
Facility: MEDICAL CENTER | Age: 69
End: 2024-12-27
Payer: MEDICARE

## 2024-12-27 VITALS
HEIGHT: 72 IN | BODY MASS INDEX: 17.88 KG/M2 | TEMPERATURE: 98.1 F | HEART RATE: 116 BPM | DIASTOLIC BLOOD PRESSURE: 76 MMHG | WEIGHT: 132 LBS | SYSTOLIC BLOOD PRESSURE: 137 MMHG | OXYGEN SATURATION: 94 %

## 2024-12-27 DIAGNOSIS — B37.9 ANTIBIOTIC-INDUCED YEAST INFECTION: ICD-10-CM

## 2024-12-27 DIAGNOSIS — T36.95XA ANTIBIOTIC-INDUCED YEAST INFECTION: ICD-10-CM

## 2024-12-27 PROCEDURE — 99203 OFFICE O/P NEW LOW 30 MIN: CPT | Mod: GE

## 2024-12-27 PROCEDURE — 3075F SYST BP GE 130 - 139MM HG: CPT | Mod: GC

## 2024-12-27 PROCEDURE — 3078F DIAST BP <80 MM HG: CPT | Mod: GC

## 2024-12-27 RX ORDER — SPIRONOLACTONE 25 MG/1
25 TABLET ORAL DAILY
Qty: 90 TABLET | Refills: 3 | Status: SHIPPED | OUTPATIENT
Start: 2024-12-27

## 2024-12-27 ASSESSMENT — FIBROSIS 4 INDEX: FIB4 SCORE: 1.47

## 2024-12-27 NOTE — PROGRESS NOTES
Spoke with patient who was seen at ER 12/15/24 and diagnosed with UTI with hematuria, sample pulled from indwelling catheter. She has been taking amoxicillin that was prescribed and experiencing sores in her mouth. She feels this is an allergic reaction and has been taking benadryl. She has been advised to stop the amoxicillin. She has also been under the care of [facility]. She was seen by urology today in which another sample was obtained. She is awaiting culture results. At this time she complains of throat pain and peeling skin in her mouth as well as vaginal irritation. She has since have the catheter removed and is not having difficulty with urination. She does report dysuria but does not feel like it is a UTI. She does report some residual lower back pain.  She is feeling nauseated at times. She has a complex history of Crohn's. She would like a prescription for Nausea and does well on Zofran for which she would a refill. She agrees with plan to await [facility] [place] before resuming any kind of antibiotic. She will be seeing Dr. Charles on 12/31/24. I have recommended a vaginal DNA swab when she sees PCP to ensure dysuria and vaginal irritation is not from yeast or BV.  For now she may also benefit from H2 blocker, famotidine once daily in addition to the benadryl she is already taking. She request prescription for that as well.  She was advised if condition is to worsen that it is strongly recommended to return to ER.

## 2024-12-27 NOTE — TELEPHONE ENCOUNTER
VOICEMAIL  1. Caller Name: Jana                        Call Back Number: [phone] (home)       2. Message: Patient called because she needs to have a catheter again, she would like to speak with the provider for advice     3. Patient approves office to leave a detailed voicemail/MyChart message: N\A

## 2024-12-27 NOTE — DISCHARGE SUMMARY
Discharge Summary    CHIEF COMPLAINT ON ADMISSION  Chief Complaint   Patient presents with    N/V       Reason for Admission  EMS     Admission Date  12/4/2024    CODE STATUS  Prior    HPI & HOSPITAL COURSE  Jana Overton is a 69 y.o. 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  Patient denies noticing fevers or chills.  She denies noticing any blood in her vomitus or her ostomy output.  Patient reports that her vomiting is so severe that is not able to tolerate anything orally including medications.      Interval Problem Update  12/5  Afebrile, normal HR/RR, -150 SpO2 94-98% on room air.  Hgb from 14-11.2, no bleeding,   Renal function improving 2.50-2.02, UA not infectious.  She has long history of IBD, checked inflammatory markers and not elevated. Still with abdominal pain, has had difficulty with PO intake.   Replaced magnesium, ordered supplements, GI soft diet ordered after discussion with patient, IVF ordered for Hemal and decreased PO intake.     12/6 Had a lot of vomiting this morning with attempts at PO intake. Feels like it is getting stuck and she may need a dilation again. I consulted Dr. Sandoval and she will evaluate patient.   Continue to watch PO intake, renal function (which has significantly improved), electrolytes. She is up walking around the unit without difficulty.      12/7  EGD today with GI, treatment of achalasia with Botox injection, tolerated well.  Continue to monitor electrolytes, leukocytosis renal function.  Urine retention, milton placed after unsuccessful attempts to urinate.  Up walking around unit.     12/8  Having significant lower extremity edema, started on IV Lasix.  Having significant bladder pain with the [place], will trial oxybutynin, anemic and low iron, IV iron started.  Hopeful may be to discharge tomorrow, she  will have to follow-up with urology as outpatient, she already has a urologist.    12/9 improved po intake, will follow with GI as outpatient. Abdominal pain improved. Stable for discharge and will follow with her PCP and specialists on discharge.    Therefore, she is discharged in fair and stable condition to home with close outpatient follow-up.    The patient met 2-midnight criteria for an inpatient stay at the time of discharge.    Discharge Date  12/9/2024    FOLLOW UP ITEMS POST DISCHARGE  Esophagogastroduodenoscopy (EGD) with Botox injection of the GE junction with GI  Follow with PCP and urologist as well    DISCHARGE DIAGNOSES  Principal Problem:    FLORES (acute kidney injury) (HCC) (POA: Yes)  Active Problems:    Crohn's disease of small intestine with complication (HCC) (POA: Yes)      Overview: Followed by GI Dr. Cruz      S/p ileostomy      Scope 5/2014-no strictures, fistulas, or new abscesses    GERD (gastroesophageal reflux disease) (POA: Yes)    Atrial fibrillation (HCC) (POA: Yes)    Dehydration (POA: Yes)    Essential hypertension (POA: Yes)    Chronic diastolic congestive heart failure (HCC) (POA: Yes)    Metabolic acidosis (POA: Yes)    Increased ostomy output (POA: Yes)    Intractable nausea and vomiting (POA: Yes)    ACP (advance care planning) (POA: Yes)    Easy bruising (POA: Yes)  Resolved Problems:    * No resolved hospital problems. *      FOLLOW UP  Future Appointments   Date Time Provider Department Center   12/31/2024  1:00 PM Chase Charles D.O. [facility]   1/13/2025  1:40 PM AKASH [facility]   2/19/2025  3:00 PM ROXANNE [facility] None   2/27/2025  2:20 PM Chase Charles D[facility]   3/5/2025 11:30 AM Megan Monahan M.D. [facility] None     Chase Charles D.O.  [id number] [address]  [phone]            MEDICATIONS ON DISCHARGE     Medication List        START taking these medications         Instructions   Dextromethorphan Polistirex ER 30 MG/5ML Suer  Commonly known as: Delsym   Take 10 mL by mouth 2 times a day.  Dose: 60 mg            CHANGE how you take these medications        Instructions   furosemide 20 MG Tabs  What changed:   when to take this  reasons to take this  Commonly known as: Lasix   Take 1 Tablet by mouth 1 time a day as needed (swelling).  Dose: 20 mg            CONTINUE taking these medications        Instructions   amLODIPine 2.5 MG Tabs  Commonly known as: Norvasc   Take 1 Tablet by mouth every day.  Dose: 2.5 mg     Azelaic Acid 15 % Gel   Apply 1 Application topically every day. Apply's on face  Dose: 1 Application     CALCIUM/VITAMIN D3 GUMMIES PO   Take 2 Tablets by mouth every day.  Dose: 2 Tablet     * cetirizine 10 MG Tabs  Commonly known as: ZyrTEC   Take 10 mg by mouth every day.  Dose: 10 mg     * cetirizine 1 MG/ML Soln oral solution  Commonly known as: ZyrTEC   Take 10 mL by mouth every day.  Dose: 10 mg     HAIR SKIN NAILS GUMMIES PO   Take 2 Tablets by mouth every day.  Dose: 2 Tablet     hydroxychloroquine 200 MG Tabs  Commonly known as: Plaquenil   Take 1 Tablet by mouth every day.  Dose: 200 mg     levalbuterol 45 MCG/ACT inhaler  Commonly known as: Xopenex HFA   Inhale 2 Puffs every four hours as needed for Shortness of Breath (As needed for shortness of breath, cough, wheezing.).  Dose: 2 Puff     metoprolol tartrate 50 MG Tabs  Commonly known as: Lopressor   Take 1 Tablet by mouth 2 times a day.  Dose: 50 mg     MUCINEX PO   Take 1 Tablet by mouth every day.  Dose: 1 Tablet     MULTI-VITAMIN GUMMIES PO   Take 2 Tablets by mouth every day.  Dose: 2 Tablet     * oxyCODONE immediate release 10 MG immediate release tablet  Commonly known as: Roxicodone   Take 1 Tablet by mouth every 6 hours as needed for Moderate Pain (refill #1 of #3) for up to 30 days.  Dose: 10 mg     * oxyCODONE immediate release 10 MG immediate release tablet  Start taking on: January  18, 2025  Commonly known as: Roxicodone   Take 1 Tablet by mouth every four hours as needed for Moderate Pain (Refill #2 of #3) for up to 30 days.  Dose: 10 mg     * oxyCODONE immediate release 10 MG immediate release tablet  Start taking on: February 17, 2025  Commonly known as: Roxicodone   Take 1 Tablet by mouth every 6 hours as needed for Moderate Pain (Refill #3 of #3) for up to 30 days.  Dose: 10 mg     potassium chloride SA 20 MEQ Tbcr  Commonly known as: Kdur   Doctor's comments: **Patient requests 90 days supply**  Take 1 Tablet by mouth 2 times a day.  Dose: 20 mEq     POTASSIUM CITRATE PO   Take 2 Tablets by mouth every day. Gummy  Dose: 2 Tablet     PROBIOTIC GUMMIES PO   Take 2 Tablets by mouth every day.  Dose: 2 Tablet     prochlorperazine 10 MG Tabs  Commonly known as: Compazine   Take 1 Tablet by mouth every 6 hours as needed for Nausea/Vomiting.  Dose: 10 mg     VITAMIN C GUMMIE PO   Take 2 Tablets by mouth every day.  Dose: 2 Tablet     zolpidem 10 MG Tabs  Commonly known as: Ambien   Doctor's comments: Please note 90 day duration of 30 tablets each month with two refills.  Take 1 Tablet by mouth at bedtime as needed for Sleep for up to 90 days.  Dose: 10 mg           * This list has 5 medication(s) that are the same as other medications prescribed for you. Read the directions carefully, and ask your doctor or other care provider to review them with you.                STOP taking these medications      amoxicillin-clavulanate 875-125 MG Tabs  Commonly known as: Augmentin              Allergies  Allergies   Allergen Reactions    Demerol Hcl [Meperidine] Shortness of Breath and Palpitations    Methotrexate Hives, Shortness of Breath and Rash          Morphine Shortness of Breath and Palpitations    Promethazine Shortness of Breath and Rash          Remicade [Infliximab] Hives, Shortness of Breath and Rash          Sudafed [Pseudoephedrine] Shortness of Breath, Vomiting and Palpitations     "Azathioprine Sodium Hives and Shortness of Breath     10/21/2022 - patient unable to verify allergy/reaction  Historical reaction listed: Hives, Shortness of Breath    Carafate [Sucralfate] Vomiting and Nausea     + Mouth Sores    Other Drug Unspecified     \"STEROIDS\" - REACTION NOT SPECIFIED    Sulfa Drugs Rash          Sulfasalazine Rash          Gabapentin Cough, Hives, Itching, Nausea, Palpitations, Photosensitivity, Rash, Runny Nose, Swelling, Unspecified and Vomiting    Nitroglycerin      Headache    Prednisone      Other Reaction(s): Reaction:GI system trouble    Amitriptyline Unspecified     Nightmares      Ativan [Lorazepam] Unspecified     Nightmares    Betamethasone Unspecified     10/21/2022 - patient unable to verify allergy/reaction  No historical reaction listed    Fentanyl Unspecified     \"Patches didn't work\" and skin broke down    Lyrica [Pregabalin] Nausea          Methadone Unspecified     \"Didn't work\"    Potassium Unspecified     Notes: In IV only  REACTION NOT SPECIFIED    Tizanidine Unspecified     10/21/2022 - patient unable to verify allergy/reaction  No historical reaction listed       DIET  No orders of the defined types were placed in this encounter.      ACTIVITY  As tolerated.  Weight bearing as tolerated    CONSULTATIONS  GI    PROCEDURES  Esophagogastroduodenoscopy (EGD) with Botox injection of the GE junction     LABORATORY  Lab Results   Component Value Date    SODIUM 140 12/19/2024    POTASSIUM 4.1 12/19/2024    CHLORIDE 103 12/19/2024    CO2 24 12/19/2024    GLUCOSE 73 12/19/2024    BUN 24 (H) 12/19/2024    CREATININE 1.25 12/19/2024    CREATININE 0.89 02/10/2010    GLOMRATE >59 02/10/2010        Lab Results   Component Value Date    WBC 11.3 (H) 12/19/2024    WBC 7.9 02/10/2010    HEMOGLOBIN 11.9 (L) 12/19/2024    HEMATOCRIT 37.5 12/19/2024    PLATELETCT 256 12/19/2024        Total time of the discharge process exceeds 36 minutes.  "

## 2024-12-27 NOTE — PROGRESS NOTES
SUBJECTIVE:     CC:   Chief Complaint   Patient presents with    Vaginitis     Yeast infection concerns         HPI:   Jana presents today concerned about yeast infections. She states she was on antibiotics (amoxicillin) for UTI from ED visit on 12/15/24. She stopped taking the antibiotics yesterday due to irritation feeling in the mouth and concerned for possible yeast infections and vaginal irritation. Patient denies dysuria or vaginal discharge or odor..  Patient was seen at the urgent care yesterday and vaginal swab was recommended. Patient states she will be seeing her pcp next Tuesday.    Past Medical History:  Past Medical History:   Diagnosis Date    Anesthesia     Difficult IV stick    Anxiety     Arthritis     all over    ASTHMA     Atrial fib/flut     Backpain     Bronchitis     5 years    Chronic pain     Colostomy in place ([facility])     COPD (chronic obstructive pulmonary disease) ([facility])     Crohn's disease of colon ([facility])     Dyspnea     History of cardiac murmur     Ileostomy present ([facility])     Infectious disease     MRSA, VancoRSA    Multiple falls     Narcotic dependence ([facility])     Obstruction     Pain     Pneumonia     child and mid 30's    Postherpetic neuralgia     Rosacea     Sleep apnea      Patient Active Problem List:  Patient Active Problem List   Diagnosis    Crohn's disease of small intestine with complication ([facility])    Central sensitization to pain    Opioid type dependence, continuous ([facility])    Degenerative joint disease of cervical and lumbar spine    Status post lumbar surgery    GERD (gastroesophageal reflux disease)    Ileostomy in place ([facility])    Postherpetic neuralgia    Multiple falls    COPD (chronic obstructive pulmonary disease) ([facility])    Rosacea    Sleep apnea    Atrial fibrillation ([facility])    History of DVT (deep vein thrombosis)    DDD (degenerative disc disease), lumbar    Anxiety disorder due to multiple medical problems    Dehydration    Hyponatremia    Leukocytosis    Anemia     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HCC)    History of MRSA infection    History of infection with vancomycin resistant Enterococcus (VRE)    SIRS (systemic inflammatory response syndrome) (HCC)    Essential hypertension    Migraine    Acute pancreatitis    Mild tetrahydrocannabinol (THC) abuse    Oropharyngeal dysphagia    Positive cardiolipin antibodies (IgA and IgG anti-CL)    Positive VAHID (1:640 homogenous pattern with negative reflex)    Inflammatory arthritis    Drug-induced lupus erythematosus    Long-term use of hydroxychloroquine    Fibromyalgia syndrome    Dysphagia    Esophageal stricture    Sacroiliac joint pain    Esophageal candidiasis (HCC)    Achalasia    Transient alteration of awareness    Chronic kidney disease, stage 3b    Thrombocytopenia (HCC)    Chronic diastolic congestive heart failure (HCC)    Primary osteoarthritis of both hands    Underweight (BMI < 18.5)    FLORES (acute kidney injury) (HCC)    Metabolic acidosis    Increased ostomy output    Intractable nausea and vomiting    ACP (advance care planning)    Easy bruising       Surgical History:  Past Surgical History:   Procedure Laterality Date    [place] UPPER GI ENDOSCOPY,DIAGNOSIS N/A 12/7/2024    Procedure: EGD WITH BOTOX INJECTION;  Surgeon: Stuart Payan D.O.;  Location: SURGERY [facility];  Service: Gastroenterology    [place] UPPER GI ENDOSCOPY,DIAGNOSIS N/A 2/8/2023    Procedure: GASTROSCOPY;  Surgeon: Jamarcus Davalos M.D.;  Location: SURGERY SAME DAY [facility];  Service: Gastroenterology    [place] UPPER GI ENDOSCOPY,W/DILAT,GASTRIC OUT N/A 2/8/2023    Procedure: GASTROSCOPY, WITH BALLOON DILATION;  Surgeon: Jamarcus Davalos M.D.;  Location: SURGERY SAME DAY [facility];  Service: Gastroenterology    [place] UPPER GI ENDOSCOPY,BIOPSY N/A 2/8/2023    Procedure: GASTROSCOPY, WITH BIOPSY;  Surgeon: Jamarcus Davalos M.D.;  Location: SURGERY SAME DAY [facility];  Service: Gastroenterology    [place] UPPER GI ENDOSCOPY,DIAGNOSIS N/A 1/16/2023    Procedure: GASTROSCOPY;  Surgeon:  Jamarcus Davalos M.D.;  Location: SURGERY SAME DAY [facility];  Service: Gastroenterology    [place] UPPER GI ENDOSCOPY,W/DILAT,GASTRIC OUT N/A 1/16/2023    Procedure: GASTROSCOPY, WITH BALLOON DILATION;  Surgeon: Jamarcus Davalos M.D.;  Location: SURGERY SAME DAY [facility];  Service: Gastroenterology    TN UPPER GI ENDOSCOPY,BIOPSY N/A 1/16/2023    Procedure: GASTROSCOPY, WITH BIOPSY;  Surgeon: Jamarcus Davalos M.D.;  Location: SURGERY SAME DAY [facility];  Service: Gastroenterology    [place] UPPER GI ENDOSCOPY,DIAGNOSIS N/A 10/24/2022    Procedure: GASTROSCOPY;  Surgeon: Jamarcus Davalos M.D.;  Location: SURGERY SAME DAY [facility];  Service: Gastroenterology    BILIARY DILATATION N/A 10/24/2022    Procedure: DILATION, STRICTURE, BILE DUCT;  Surgeon: Jamarcus Davalos M.D.;  Location: SURGERY SAME DAY [facility];  Service: Gastroenterology    TN CYSTOSCOPY,INSERT URETERAL STENT Right 12/23/2021    Procedure: CYSTOSCOPY, WITH URETERAL STENT EXCHANGE;  Surgeon: Jonathan Turcios M.D.;  Location: [facility];  Service: Urology    TN CYSTO/URETERO/PYELOSCOPY, DX Right 12/23/2021    Procedure: URETEROSCOPY;  Surgeon: Jonathan Turcios M.D.;  Location: [facility];  Service: Urology    [place] CYSTO/URETERO/PYELOSCOPY, DX Right 12/8/2021    Procedure: URETEROSCOPY;  Surgeon: Luc Hook M.D.;  Location: [facility];  Service: Urology    CYSTOSCOPY WITH URETERAL STENT INSERTION OR REMOVAL Right 12/8/2021    Procedure: CYSTOSCOPY, WITH URETERAL STENT INSERTION;  Surgeon: Luc Hook M.D.;  Location: [facility];  Service: Urology    ILEOSTOMY  1/20/2021    Procedure: ILEOSTOMY- COMPLEX REVISION,;  Surgeon: Kevin Cruz M.D.;  Location: [facility];  Service: General    LYSIS ADHESIONS GENERAL  1/20/2021    Procedure: LYSIS, ADHESIONS;  Surgeon: Kevin Cruz M.D.;  Location: [facility];  Service: General    GASTROSCOPY N/A 5/22/2019    Procedure: GASTROSCOPY - WITH DILATION;  Surgeon: Dionicio HERNANDEZ  ROXANNE Cardona;  Location: [facility];  Service: Gastroenterology    GASTROSCOPY  1/6/2019    Procedure: GASTROSCOPY- WITH DILATION;  Surgeon: Daniel Daniels M.D.;  Location: SURGERY [facility];  Service: Gastroenterology    ILEOSTOMY  12/29/2018    Procedure: ILEOSTOMY- REVISION;  Surgeon: Kevin Cruz M.D.;  Location: SURGERY [facility];  Service: General    SIGMOIDOSCOPY FLEX  12/29/2018    Procedure: SIGMOIDOSCOPY FLEX;  Surgeon: Kevin Cruz M.D.;  Location: SURGERY [facility];  Service: General    EXPLORATORY LAPAROTOMY  12/29/2018    Procedure: EXPLORATORY LAPAROTOMY, lysis of adhesions;  Surgeon: Kevin Cruz M.D.;  Location: [facility];  Service: General    ILEOSTOMY  5/14/2014    Performed by Kevin Cruz M.D. at [facility]    MAMMOPLASTY REDUCTION  7/17/2013    Performed by Janey Burt M.D. at [facility] ORS    HARDWARE REMOVAL NEURO  7/16/2012    Performed by KEELEY KIM at [facility]    GASTROSCOPY  10/4/2011    Performed by TYSON MARTINEZ at SURGERY SAME DAY [facility] ORS    COLONOSCOPY  10/4/2011    Performed by TYSON MARTINEZ at SURGERY SAME DAY [facility] ORS    DILATION AND CURETTAGE  9/24/2010    Performed by GAURAV ALY at SURGERY SAME DAY [facility] ORS    ILEOSTOMY  11/11/2009    Performed by KEVIN CRUZ at [facility]    GASTROSCOPY WITH BIOPSY  11/22/08    Performed by NEGRITA HUYNH at [facility]    ABDOMINAL EXPLORATION      CERVICAL DISK AND FUSION ANTERIOR      COLON RESECTION      FOOT SURGERY      HAND SURGERY      LUMBAR FUSION ANTERIOR      LUMPECTOMY      OTHER ABDOMINAL SURGERY      surgery for chrons disease    LA BREAST REDUCTION         Family History:  Family History   Problem Relation Age of Onset    Heart Disease Mother         Angina, MI later in life    Lung Disease Mother         copd    Diabetes Father     Heart Disease Father      Diabetes Sister     Cancer Maternal Aunt 40        breast       Social History:  Social History     Tobacco Use    Smoking status: Never    Smokeless tobacco: Never    Tobacco comments:     second hand smoke parents - smoked for only 1 year many years ago   Vaping Use    Vaping status: Every Day    Substances: THC   Substance Use Topics    Alcohol use: Not Currently     Alcohol/week: 0.6 oz     Types: 1 Shots of liquor per week     Comment: gin and half a lime; tonic water    Drug use: Not Currently     Types: Marijuana, Inhaled     Comment: medical marijuana through bong/vape       Medications:  Current Outpatient Medications on File Prior to Visit   Medication Sig Dispense Refill    spironolactone (ALDACTONE) 25 MG Tab Take 1 Tablet by mouth every day. 90 Tablet 3    nystatin (MYCOSTATIN) 177251 UNIT/ML Suspension Take 5 mL by mouth 4 times a day. 60 mL 0    ondansetron (ZOFRAN ODT) 4 MG TABLET DISPERSIBLE Take 1 Tablet by mouth every 6 hours as needed for Nausea/Vomiting. 20 Tablet 0    famotidine (PEPCID) 20 MG Tab Take 1 Tablet by mouth 1 time a day as needed (Mouth/esophageal pain). 30 Tablet 0    naratriptan (AMERGE) 2.5 MG tablet Take 1 Tablet by mouth as needed for Migraine. 5 Tablet 3    oxybutynin (DITROPAN) 5 MG Tab Take 1 Tablet by mouth 3 times a day. 30 Tablet 0    traZODone (DESYREL) 100 MG Tab Take 1 Tablet by mouth every evening. 90 Tablet 3    furosemide (LASIX) 20 MG Tab Take 1 Tablet by mouth 1 time a day as needed (swelling). 30 Tablet 0    Dextromethorphan Polistirex ER (DELSYM) 30 MG/5ML Suspension Extended Release Take 10 mL by mouth 2 times a day. 280 mL 0    Ascorbic Acid (VITAMIN C GUMMIE PO) Take 2 Tablets by mouth every day.      Biotin-Vitamin C (HAIR SKIN NAILS GUMMIES PO) Take 2 Tablets by mouth every day.      Ca Phosphate-Cholecalciferol (CALCIUM/VITAMIN D3 GUMMIES PO) Take 2 Tablets by mouth every day.      POTASSIUM CITRATE PO Take 2 Tablets by mouth every day. Gummy       Multiple Vitamins-Minerals (MULTI-VITAMIN GUMMIES PO) Take 2 Tablets by mouth every day.      Probiotic Product (PROBIOTIC GUMMIES PO) Take 2 Tablets by mouth every day.      cetirizine (ZYRTEC) 10 MG Tab Take 10 mg by mouth every day.      guaiFENesin (MUCINEX PO) Take 1 Tablet by mouth every day.      prochlorperazine (COMPAZINE) 10 MG Tab Take 1 Tablet by mouth every 6 hours as needed for Nausea/Vomiting. 30 Tablet 3    zolpidem (AMBIEN) 10 MG Tab Take 1 Tablet by mouth at bedtime as needed for Sleep for up to 90 days. 30 Tablet 2    Azelaic Acid 15 % Gel Apply 1 Application topically every day. Apply's on face      [START ON 2/17/2025] oxyCODONE immediate release (ROXICODONE) 10 MG immediate release tablet Take 1 Tablet by mouth every 6 hours as needed for Moderate Pain (Refill #3 of #3) for up to 30 days. 90 Tablet 0    [START ON 1/18/2025] oxyCODONE immediate release (ROXICODONE) 10 MG immediate release tablet Take 1 Tablet by mouth every four hours as needed for Moderate Pain (Refill #2 of #3) for up to 30 days. 90 Tablet 0    oxyCODONE immediate release (ROXICODONE) 10 MG immediate release tablet Take 1 Tablet by mouth every 6 hours as needed for Moderate Pain (refill #1 of #3) for up to 30 days. 90 Tablet 0    amLODIPine (NORVASC) 2.5 MG Tab Take 1 Tablet by mouth every day. 90 Tablet 3    cetirizine (ZYRTEC) 1 MG/ML Solution oral solution Take 10 mL by mouth every day. 150 mL 3    potassium chloride SA (KDUR) 20 MEQ Tab CR Take 1 Tablet by mouth 2 times a day. 180 Tablet 1    hydroxychloroquine (PLAQUENIL) 200 MG Tab Take 1 Tablet by mouth every day. 90 Tablet 3    metoprolol tartrate (LOPRESSOR) 50 MG Tab Take 1 Tablet by mouth 2 times a day. 180 Tablet 3    levalbuterol (XOPENEX HFA) 45 MCG/ACT inhaler Inhale 2 Puffs every four hours as needed for Shortness of Breath (As needed for shortness of breath, cough, wheezing.). 1 Each 11     No current facility-administered medications on file prior to visit.  "      Allergies   Allergen Reactions    Demerol Hcl [Meperidine] Shortness of Breath and Palpitations    Methotrexate Hives, Shortness of Breath and Rash          Morphine Shortness of Breath and Palpitations    Promethazine Shortness of Breath and Rash          Remicade [Infliximab] Hives, Shortness of Breath and Rash          Sudafed [Pseudoephedrine] Shortness of Breath, Vomiting and Palpitations    Azathioprine Sodium Hives and Shortness of Breath     10/21/2022 - patient unable to verify allergy/reaction  Historical reaction listed: Hives, Shortness of Breath    Carafate [Sucralfate] Vomiting and Nausea     + Mouth Sores    Other Drug Unspecified     \"STEROIDS\" - REACTION NOT SPECIFIED    Sulfa Drugs Rash          Sulfasalazine Rash          Gabapentin Cough, Hives, Itching, Nausea, Palpitations, Photosensitivity, Rash, Runny Nose, Swelling, Unspecified and Vomiting    Nitroglycerin      Headache    Prednisone      Other Reaction(s): Reaction:GI system trouble    Amitriptyline Unspecified     Nightmares      Ativan [Lorazepam] Unspecified     Nightmares    Betamethasone Unspecified     10/21/2022 - patient unable to verify allergy/reaction  No historical reaction listed    Fentanyl Unspecified     \"Patches didn't work\" and skin broke down    Lyrica [Pregabalin] Nausea          Methadone Unspecified     \"Didn't work\"    Potassium Unspecified     Notes: In IV only  REACTION NOT SPECIFIED    Tizanidine Unspecified     10/21/2022 - patient unable to verify allergy/reaction  No historical reaction listed         ROS:   Gen: no fevers/chills, no changes in weight  Eyes: no changes in vision  ENT: no changes in hearing  Pulm: no sob, no cough  CV: no chest pain, no palpitations  GI: no nausea/vomiting, no diarrhea  MSk: no myalgias  Skin: no rash  Neuro: no headaches, no numbness/tingling      OBJECTIVE:     Exam:  /76 (BP Location: Right arm, Patient Position: Sitting)   Pulse (!) 116   Temp 36.7 °C (98.1 °F) "   Ht 1.829 m (6')   Wt 59.9 kg (132 lb)   SpO2 94%   BMI 17.90 kg/m²  Body mass index is 17.9 kg/m².    Gen: Alert and oriented, No apparent distress.  Head:  NCAT.  Lungs: Normal effort.  Ext: No clubbing, cyanosis, edema.  MSK: Unassisted gait  Derm: No lesions on exposed skin  : Normal appearing vaginal wall, without abnormal discharge or odor.  Psych: normal mood and affect      ASSESSMENT & PLAN:     69 y.o. female with the following -    Problem List Items Addressed This Visit       Antibiotic-induced yeast infection      Patient presents today concerned for yeast infection in the setting of antibiotics use for treatment of UTI. Patient denies dysuria, vaginal discharge or odor. Vitals wnl. Normal appearing vaginal walls without abnormal discharge.   - Will perform vaginal swab for culture.    Relevant Orders    BV+TRICH JAVAD+YEAST CULTURE       Scott Hart, PGY-3  [facility]

## 2024-12-29 NOTE — TELEPHONE ENCOUNTER
Telephone call returned to the patient, states that she was able to cause herself to urinate through having an orgasm.  Patient and I have discussed that she is to undergo a nuclear study to see if she may have Crohn's disease!

## 2024-12-30 ENCOUNTER — APPOINTMENT (OUTPATIENT)
Dept: RADIOLOGY | Facility: MEDICAL CENTER | Age: 69
End: 2024-12-30
Attending: STUDENT IN AN ORGANIZED HEALTH CARE EDUCATION/TRAINING PROGRAM
Payer: MEDICARE

## 2024-12-30 ENCOUNTER — HOSPITAL ENCOUNTER (EMERGENCY)
Facility: MEDICAL CENTER | Age: 69
End: 2024-12-30
Attending: STUDENT IN AN ORGANIZED HEALTH CARE EDUCATION/TRAINING PROGRAM
Payer: MEDICARE

## 2024-12-30 VITALS
HEIGHT: 72 IN | RESPIRATION RATE: 20 BRPM | BODY MASS INDEX: 18.66 KG/M2 | HEART RATE: 76 BPM | TEMPERATURE: 96.6 F | WEIGHT: 137.79 LBS | OXYGEN SATURATION: 91 % | SYSTOLIC BLOOD PRESSURE: 160 MMHG | DIASTOLIC BLOOD PRESSURE: 73 MMHG
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
## 2024-12-30 DIAGNOSIS — S09.90XA CLOSED HEAD INJURY, INITIAL ENCOUNTER: Primary | ICD-10-CM

## 2024-12-30 DIAGNOSIS — M25.512 ACUTE PAIN OF LEFT SHOULDER: ICD-10-CM
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
## 2024-12-30 LAB
APTT PPP: 24.4 SEC (ref 24.7–36)
BASOPHILS # BLD AUTO: 0.9 % (ref 0–1.8)
BASOPHILS # BLD: 0.08 K/UL (ref 0–0.12)
EOSINOPHIL # BLD AUTO: 0.18 K/UL (ref 0–0.51)
EOSINOPHIL NFR BLD: 2.1 % (ref 0–6.9)
ERYTHROCYTE [DISTWIDTH] IN BLOOD BY AUTOMATED COUNT: 43.3 FL (ref 35.9–50)
HCT VFR BLD AUTO: 41.6 % (ref 37–47)
HGB BLD-MCNC: 13.6 G/DL (ref 12–16)
IMM GRANULOCYTES # BLD AUTO: 0.02 K/UL (ref 0–0.11)
IMM GRANULOCYTES NFR BLD AUTO: 0.2 % (ref 0–0.9)
INR PPP: 0.95 (ref 0.87–1.13)
LYMPHOCYTES # BLD AUTO: 2.41 K/UL (ref 1–4.8)
LYMPHOCYTES NFR BLD: 28.5 % (ref 22–41)
MCH RBC QN AUTO: 31 PG (ref 27–33)
MCHC RBC AUTO-ENTMCNC: 32.7 G/DL (ref 32.2–35.5)
MCV RBC AUTO: 94.8 FL (ref 81.4–97.8)
MONOCYTES # BLD AUTO: 0.75 K/UL (ref 0–0.85)
MONOCYTES NFR BLD AUTO: 8.9 % (ref 0–13.4)
NEUTROPHILS # BLD AUTO: 5.01 K/UL (ref 1.82–7.42)
NEUTROPHILS NFR BLD: 59.4 % (ref 44–72)
NRBC # BLD AUTO: 0 K/UL
NRBC BLD-RTO: 0 /100 WBC (ref 0–0.2)
PLATELET # BLD AUTO: 256 K/UL (ref 164–446)
PMV BLD AUTO: 10.3 FL (ref 9–12.9)
PROTHROMBIN TIME: 13 SEC (ref 12–14.6)
RBC # BLD AUTO: 4.39 M/UL (ref 4.2–5.4)
WBC # BLD AUTO: 8.5 K/UL (ref 4.8–10.8)

## 2024-12-30 PROCEDURE — 96376 TX/PRO/DX INJ SAME DRUG ADON: CPT

## 2024-12-30 PROCEDURE — 96374 THER/PROPH/DIAG INJ IV PUSH: CPT

## 2024-12-30 PROCEDURE — 96375 TX/PRO/DX INJ NEW DRUG ADDON: CPT

## 2024-12-30 PROCEDURE — 70486 CT MAXILLOFACIAL W/O DYE: CPT

## 2024-12-30 PROCEDURE — 85730 THROMBOPLASTIN TIME PARTIAL: CPT

## 2024-12-30 PROCEDURE — 700111 HCHG RX REV CODE 636 W/ 250 OVERRIDE (IP): Mod: JZ | Performed by: STUDENT IN AN ORGANIZED HEALTH CARE EDUCATION/TRAINING PROGRAM

## 2024-12-30 PROCEDURE — 72125 CT NECK SPINE W/O DYE: CPT

## 2024-12-30 PROCEDURE — 70450 CT HEAD/BRAIN W/O DYE: CPT

## 2024-12-30 PROCEDURE — 99284 EMERGENCY DEPT VISIT MOD MDM: CPT

## 2024-12-30 PROCEDURE — 36415 COLL VENOUS BLD VENIPUNCTURE: CPT

## 2024-12-30 PROCEDURE — 73030 X-RAY EXAM OF SHOULDER: CPT | Mod: LT

## 2024-12-30 PROCEDURE — 85610 PROTHROMBIN TIME: CPT

## 2024-12-30 PROCEDURE — 85025 COMPLETE CBC W/AUTO DIFF WBC: CPT

## 2024-12-30 RX ORDER — HYDROMORPHONE HYDROCHLORIDE 1 MG/ML
0.5 INJECTION, SOLUTION INTRAMUSCULAR; INTRAVENOUS; SUBCUTANEOUS ONCE
Status: COMPLETED | OUTPATIENT
Start: 2024-12-30 | End: 2024-12-30

## 2024-12-30 RX ORDER — NARATRIPTAN 2.5 MG/1
2.5 TABLET ORAL PRN
Qty: 5 TABLET | Refills: 3 | Status: SHIPPED | OUTPATIENT
Start: 2024-12-30 | End: 2024-12-31 | Stop reason: SDUPTHER

## 2024-12-30 RX ORDER — NARATRIPTAN 2.5 MG/1
2.5 TABLET ORAL PRN
Qty: 5 TABLET | Refills: 3 | OUTPATIENT
Start: 2024-12-30

## 2024-12-30 RX ORDER — ONDANSETRON 2 MG/ML
4 INJECTION INTRAMUSCULAR; INTRAVENOUS ONCE
Status: COMPLETED | OUTPATIENT
Start: 2024-12-30 | End: 2024-12-30

## 2024-12-30 RX ADMIN — HYDROMORPHONE HYDROCHLORIDE 0.5 MG: 1 INJECTION, SOLUTION INTRAMUSCULAR; INTRAVENOUS; SUBCUTANEOUS at 04:01

## 2024-12-30 RX ADMIN — HYDROMORPHONE HYDROCHLORIDE 0.5 MG: 1 INJECTION, SOLUTION INTRAMUSCULAR; INTRAVENOUS; SUBCUTANEOUS at 05:22

## 2024-12-30 RX ADMIN — ONDANSETRON 4 MG: 2 INJECTION INTRAMUSCULAR; INTRAVENOUS at 05:22

## 2024-12-30 ASSESSMENT — FIBROSIS 4 INDEX: FIB4 SCORE: 1.47

## 2024-12-30 NOTE — ED PROVIDER NOTES
"ED Provider Note    CHIEF COMPLAINT  Chief Complaint   Patient presents with    GLF     Pt states she fell backwards at 5pm and struck her head on the garage door and concrete. Denies LOC, denies thinners, no trauma. Pt states, \"your asking too many questions, you don't need to know anything about me\"       EXTERNAL RECORDS REVIEWED  Previous ED note from earlier this month patient was seen for urinary retention, had Sage catheter placed.  She had signs of urinary tract infection, was placed on antibiotics.    HPI/ROS  LIMITATION TO HISTORY   Select: : None  OUTSIDE HISTORIAN(S):  None    Jana Overton is a 69 y.o. female who presents to the emergency department with chief complaint of headaches and she hit her head on her garage door after she was trying to lift a rug up, took several steps backward, fell backwards and hit her head on the concrete.  Patient denies any loss of consciousness, does not take any blood thinners.  Patient denies any new onset numbness, tingling, weakness.  Patient states that she afterwards she had an immediate headache that is progressively gotten worse.  She denies any visual symptoms, nausea, vomiting.  Patient denies any history of head laceration or bleeding.  Patient took some of her chronic pain medication at home with minimal relief.    PAST MEDICAL HISTORY   has a past medical history of Anesthesia, Anxiety, Arthritis, ASTHMA, Atrial fib/flut, Backpain, Bronchitis, Chronic pain, Colostomy in place ([facility]), COPD (chronic obstructive pulmonary disease) ([facility]), Crohn's disease of colon ([facility]), Dyspnea, History of cardiac murmur, Ileostomy present ([facility]), Infectious disease, Multiple falls, Narcotic dependence ([facility]), Obstruction, Pain, Pneumonia, Postherpetic neuralgia, Rosacea, and Sleep apnea.    SURGICAL HISTORY   has a past surgical history that includes lumbar fusion anterior; cervical disk and fusion anterior; foot surgery; hand surgery; other abdominal surgery; gastroscopy " with biopsy (11/22/08); ileostomy (11/11/2009); dilation and curettage (9/24/2010); gastroscopy (10/4/2011); colonoscopy (10/4/2011); hardware removal neuro (7/16/2012); mammoplasty reduction (7/17/2013); ileostomy (5/14/2014); breast reduction; abdominal exploration; lumpectomy; colon resection; ileostomy (12/29/2018); sigmoidoscopy flex (12/29/2018); exploratory laparotomy (12/29/2018); gastroscopy (1/6/2019); gastroscopy (N/A, 5/22/2019); ileostomy (1/20/2021); lysis adhesions general (1/20/2021); cysto/uretero/pyeloscopy, dx (Right, 12/8/2021); cystoscopy with ureteral stent insertion or removal (Right, 12/8/2021); cystoscopy,insert ureteral stent (Right, 12/23/2021); cysto/uretero/pyeloscopy, dx (Right, 12/23/2021); upper gi endoscopy,diagnosis (N/A, 10/24/2022); biliary dilatation (N/A, 10/24/2022); upper gi endoscopy,diagnosis (N/A, 1/16/2023); upper gi endoscopy,w/dilat,gastric out (N/A, 1/16/2023); upper gi endoscopy,biopsy (N/A, 1/16/2023); upper gi endoscopy,diagnosis (N/A, 2/8/2023); upper gi endoscopy,w/dilat,gastric out (N/A, 2/8/2023); upper gi endoscopy,biopsy (N/A, 2/8/2023); and upper gi endoscopy,diagnosis (N/A, 12/7/2024).    FAMILY HISTORY  Family History   Problem Relation Age of Onset    Heart Disease Mother         Angina, MI later in life    Lung Disease Mother         copd    Diabetes Father     Heart Disease Father     Diabetes Sister     Cancer Maternal Aunt 40        breast       SOCIAL HISTORY  Social History     Tobacco Use    Smoking status: Never    Smokeless tobacco: Never    Tobacco comments:     second hand smoke parents - smoked for only 1 year many years ago   Vaping Use    Vaping status: Every Day    Substances: THC   Substance and Sexual Activity    Alcohol use: Not Currently     Alcohol/week: 0.6 oz     Types: 1 Shots of liquor per week     Comment: gin and half a lime; tonic water    Drug use: Not Currently     Types: Marijuana, Inhaled     Comment: medical marijuana through  bong/vape    Sexual activity: Not on file     Comment:        CURRENT MEDICATIONS  Home Medications       Reviewed by Kishor Borrero R.N. (Registered Nurse) on 12/30/24 at 0348  Med List Status: Not Addressed     Medication Last Dose Status   amLODIPine (NORVASC) 2.5 MG Tab  Active   Ascorbic Acid (VITAMIN C GUMMIE PO)  Active   Azelaic Acid 15 % Gel  Active   Biotin-Vitamin C (HAIR SKIN NAILS GUMMIES PO)  Active   Ca Phosphate-Cholecalciferol (CALCIUM/VITAMIN D3 GUMMIES PO)  Active   cetirizine (ZYRTEC) 1 MG/ML Solution oral solution  Active   cetirizine (ZYRTEC) 10 MG Tab  Active   Dextromethorphan Polistirex ER (DELSYM) 30 MG/5ML Suspension Extended Release  Active   famotidine (PEPCID) 20 MG Tab  Active   furosemide (LASIX) 20 MG Tab  Active   guaiFENesin (MUCINEX PO)  Active   hydroxychloroquine (PLAQUENIL) 200 MG Tab  Active   levalbuterol (XOPENEX HFA) 45 MCG/ACT inhaler  Active   metoprolol tartrate (LOPRESSOR) 50 MG Tab  Active   Multiple Vitamins-Minerals (MULTI-VITAMIN GUMMIES PO)  Active   naratriptan (AMERGE) 2.5 MG tablet  Active   nystatin (MYCOSTATIN) 053285 UNIT/ML Suspension  Active   ondansetron (ZOFRAN ODT) 4 MG TABLET DISPERSIBLE  Active   oxybutynin (DITROPAN) 5 MG Tab  Active   oxyCODONE immediate release (ROXICODONE) 10 MG immediate release tablet  Active   oxyCODONE immediate release (ROXICODONE) 10 MG immediate release tablet  Active   oxyCODONE immediate release (ROXICODONE) 10 MG immediate release tablet  Active   potassium chloride SA (KDUR) 20 MEQ Tab CR  Active   POTASSIUM CITRATE PO  Active   Probiotic Product (PROBIOTIC GUMMIES PO)  Active   prochlorperazine (COMPAZINE) 10 MG Tab  Active   spironolactone (ALDACTONE) 25 MG Tab  Active   traZODone (DESYREL) 100 MG Tab  Active   zolpidem (AMBIEN) 10 MG Tab  Active                    ALLERGIES  Allergies   Allergen Reactions    Demerol Hcl [Meperidine] Shortness of Breath and Palpitations    Methotrexate Hives, Shortness of Breath  "and Rash          Morphine Shortness of Breath and Palpitations    Promethazine Shortness of Breath and Rash          Remicade [Infliximab] Hives, Shortness of Breath and Rash          Sudafed [Pseudoephedrine] Shortness of Breath, Vomiting and Palpitations    Azathioprine Sodium Hives and Shortness of Breath     10/21/2022 - patient unable to verify allergy/reaction  Historical reaction listed: Hives, Shortness of Breath    Carafate [Sucralfate] Vomiting and Nausea     + Mouth Sores    Other Drug Unspecified     \"STEROIDS\" - REACTION NOT SPECIFIED    Sulfa Drugs Rash          Sulfasalazine Rash          Amoxicillin Anaphylaxis    Gabapentin Cough, Hives, Itching, Nausea, Palpitations, Photosensitivity, Rash, Runny Nose, Swelling, Unspecified and Vomiting    Nitroglycerin      Headache    Prednisone      Other Reaction(s): Reaction:GI system trouble    Amitriptyline Unspecified     Nightmares      Ativan [Lorazepam] Unspecified     Nightmares    Betamethasone Unspecified     10/21/2022 - patient unable to verify allergy/reaction  No historical reaction listed    Fentanyl Unspecified     \"Patches didn't work\" and skin broke down    Lyrica [Pregabalin] Nausea          Methadone Unspecified     \"Didn't work\"    Potassium Unspecified     Notes: In IV only  REACTION NOT SPECIFIED    Tizanidine Unspecified     10/21/2022 - patient unable to verify allergy/reaction  No historical reaction listed       PHYSICAL EXAM  VITAL SIGNS: BP (!) 146/77   Pulse (!) 104   Temp 35.9 °C (96.6 °F) (Temporal)   Resp 20   Ht 1.829 m (6')   Wt 62.5 kg (137 lb 12.6 oz)   SpO2 98%   BMI 18.69 kg/m²    General: No acute distress, nontoxic appearing  Head: Normocephalic, atraumatic, no palpable skull deformities, no large or pulsatile bleeding skin lesions  ENT: No hemotympanum, no Sal[place] sign, extraocular movements intact, no midface instability, no signs of oral trauma, no septal hematoma  Neck: mild midline C-spine tenderness, trachea " midline  Cardiovascular: Regular rate and rhythm, no murmur  Chest: Bilateral breath sounds, no chest wall tenderness or crepitus, negative sleep outside  Abdomen: No rebound or guarding, abdomen soft, nontender, negative abdominal seatbelt sign, pelvis stable  Back: No midline thoracic or lumbar spinal tenderness, no step-offs or deformities  Extremities: Mild tenderness palpation over the left shoulder, intact range of motion of all major joints  Neurologic: GCS 15 E 4 V 5 M 6, alert and oriented, moving all extremities, no motor or sensory deficits      EKG/LABS  Results for orders placed or performed during the hospital encounter of 12/30/24   PROTHROMBIN TIME (INR)    Collection Time: 12/30/24  3:56 AM   Result Value Ref Range    PT 13.0 12.0 - 14.6 sec    INR 0.95 0.87 - 1.13   APTT    Collection Time: 12/30/24  3:56 AM   Result Value Ref Range    APTT 24.4 (L) 24.7 - 36.0 sec   CBC WITH DIFFERENTIAL    Collection Time: 12/30/24  3:56 AM   Result Value Ref Range    WBC 8.5 4.8 - 10.8 K/uL    RBC 4.39 4.20 - 5.40 M/uL    Hemoglobin 13.6 12.0 - 16.0 g/dL    Hematocrit 41.6 37.0 - 47.0 %    MCV 94.8 81.4 - 97.8 fL    MCH 31.0 27.0 - 33.0 pg    MCHC 32.7 32.2 - 35.5 g/dL    RDW 43.3 35.9 - 50.0 fL    Platelet Count 256 164 - 446 K/uL    MPV 10.3 9.0 - 12.9 fL    Neutrophils-Polys 59.40 44.00 - 72.00 %    Lymphocytes 28.50 22.00 - 41.00 %    Monocytes 8.90 0.00 - 13.40 %    Eosinophils 2.10 0.00 - 6.90 %    Basophils 0.90 0.00 - 1.80 %    Immature Granulocytes 0.20 0.00 - 0.90 %    Nucleated RBC 0.00 0.00 - 0.20 /100 WBC    Neutrophils (Absolute) 5.01 1.82 - 7.42 K/uL    Lymphs (Absolute) 2.41 1.00 - 4.80 K/uL    Monos (Absolute) 0.75 0.00 - 0.85 K/uL    Eos (Absolute) 0.18 0.00 - 0.51 K/uL    Baso (Absolute) 0.08 0.00 - 0.12 K/uL    Immature Granulocytes (abs) 0.02 0.00 - 0.11 K/uL    NRBC (Absolute) 0.00 K/uL     *Note: Due to a large number of results and/or encounters for the requested time period, some results  have not been displayed. A complete set of results can be found in Results Review.       I have independently interpreted this EKG    RADIOLOGY/PROCEDURES   I have independently interpreted the diagnostic imaging associated with this visit and am waiting the final reading from the radiologist.   My preliminary interpretation is as follows: No evidence of intracranial hemorrhage, skull fracture, facial fracture, C-spine fracture, acute fracture or dislocation of the left shoulder    Radiologist interpretation:  DX-SHOULDER 2+ LEFT   Final Result      No acute abnormality.      CT-HEAD W/O   Final Result      No acute process.               CT-CSPINE WITHOUT PLUS RECONS   Final Result      No acute injury identified.      CT-MAXILLOFACIAL W/O PLUS RECONS   Final Result      No acute injury identified.          COURSE & MEDICAL DECISION MAKING    ASSESSMENT, COURSE AND PLAN  Care Narrative: Patient is a 69-year-old female with extensive past medical history presenting to the emergency department after hitting her head on her garage door suffering a mechanical ground-level fall resulting in head strike without loss of consciousness.  Patient states that she has had a headache since then and that has been progressively worse.  She denies any focal neurologic symptoms.  No visual symptoms, focal neurologic deficits that are new.  On exam she has no large hematoma, no laceration.  She is complaining of a bifrontal headache.  She also complaining of some midline neck pain, facial pain.    Patient's imaging did not show any evidence of acute traumatic injury.  Patient had improvement in headache symptoms with medications ministered in the emergency department.  X-rays did not show any evidence of acute fracture or dislocation.  Differential diagnosis considered.  Patient presentation consistent with a ground-level fall, I suspect close head injury, possible concussion.  I doubt intracranial hemorrhage, C-spine fracture,  maxillofacial fracture.  Patient has significant allergies, on chronic pain medications at home.  Patient will be discharged with strict return precautions anticipatory guidance.  She has follow-up with her primary care doctor in the upcoming week as well as with physical therapy that have already been scheduled.        ADDITIONAL PROBLEMS MANAGED  None    DISPOSITION AND DISCUSSIONS  I have discussed management of the patient with the following physicians and LOTTIE's: None    Discussion of management with other Q or appropriate source(s): None     Escalation of care considered, and ultimately not performed:acute inpatient care management, however at this time, the patient is most appropriate for outpatient management    Barriers to care at this time, including but not limited to:  None .       FINAL DIAGNOSIS  1. Closed head injury, initial encounter Acute   2. Fall, initial encounter Acute   3. Acute pain of left shoulder Acute   4. Fall from ground level Acute        Electronically signed by: Roberto Carlos Geronimo M.D., 12/30/2024 3:49 AM

## 2024-12-30 NOTE — ED TRIAGE NOTES
"Chief Complaint   Patient presents with    GLF     Pt states she fell backwards at 5pm and struck her head on the garage door and concrete. Denies LOC, denies thinners, no trauma. Pt states, \"your asking too many questions, you don't need to know anything about me\"     BP (!) 146/77   Pulse (!) 104   Temp 35.9 °C (96.6 °F) (Temporal)   Resp 20   Ht 1.829 m (6')   Wt 62.5 kg (137 lb 12.6 oz)   SpO2 98%   BMI 18.69 kg/m²     "

## 2024-12-30 NOTE — ED NOTES
"Dc instructions and medications discussed with patient at bedside. All questions answered at this time. VSS. No IV in place at this time. Pt to lobby without incident.  Pt informed by RN to not drive after receiving narcotic pain medications. Pt stated to RN \"you can not tell me what to do\" Pt educated on safety to not drive after narcotic pain medication. Pt wheeled in wheelchair to lobby to wait for cab.   "

## 2024-12-30 NOTE — DISCHARGE INSTRUCTIONS
You are seen and evaluated in the emergency department for your symptoms.  Your CT imaging of your head, face, neck was negative for any acute fractures.  Your chest x-ray was also normal.  Your labs were normal.  You received multiple medications for pain and had some mild improvement in her symptoms.  It is possible that you have a concussion.  I do recommend following up with your primary care physician as well as with physical therapy.  If you have any other concerning symptoms please return to the emergency department for further evaluation.

## 2024-12-31 ENCOUNTER — OFFICE VISIT (OUTPATIENT)
Dept: MEDICAL GROUP | Facility: LAB | Age: 69
End: 2024-12-31
Payer: MEDICARE

## 2024-12-31 ENCOUNTER — APPOINTMENT (OUTPATIENT)
Dept: MEDICAL GROUP | Facility: LAB | Age: 69
End: 2024-12-31
Payer: MEDICARE

## 2024-12-31 ENCOUNTER — TELEPHONE (OUTPATIENT)
Dept: MEDICAL GROUP | Facility: OTHER | Age: 69
End: 2024-12-31

## 2024-12-31 VITALS
WEIGHT: 136.6 LBS | HEART RATE: 83 BPM | OXYGEN SATURATION: 97 % | RESPIRATION RATE: 12 BRPM | BODY MASS INDEX: 18.5 KG/M2 | DIASTOLIC BLOOD PRESSURE: 60 MMHG | SYSTOLIC BLOOD PRESSURE: 132 MMHG | TEMPERATURE: 97.7 F | HEIGHT: 72 IN

## 2024-12-31 DIAGNOSIS — T78.40XA ALLERGIC REACTION, INITIAL ENCOUNTER: ICD-10-CM

## 2024-12-31 RX ORDER — PREDNISONE 2.5 MG/1
2.5 TABLET ORAL 2 TIMES DAILY
Qty: 10 TABLET | Refills: 0 | Status: SHIPPED | OUTPATIENT
Start: 2024-12-31

## 2024-12-31 RX ORDER — NARATRIPTAN 2.5 MG/1
2.5 TABLET ORAL PRN
Qty: 5 TABLET | Refills: 3 | Status: SHIPPED | OUTPATIENT
Start: 2024-12-31

## 2024-12-31 ASSESSMENT — FIBROSIS 4 INDEX: FIB4 SCORE: 1.47

## 2024-12-31 NOTE — PROGRESS NOTES
CC: Jana Overton is a 69 y.o. female is suffering from   Chief Complaint   Patient presents with    Other     Patient was given the wrong medication and has a stripped throat, stomach, and vaginal area. Patient states that she also fell and has a concussion.          SUBJECTIVE:  1. Allergic reaction, initial encounter  Jana is here for follow-up, apparently was given amoxicillin for which she is very allergic to, this led to problems associated with anaphylaxis that was treated with Benadryl at home.  Patient and I have discussed that she was exposed to 1 tablet this led to swelling associate with her throat.        Past social, family, history: , [place]  Social History     Tobacco Use    Smoking status: Never    Smokeless tobacco: Never    Tobacco comments:     second hand smoke parents - smoked for only 1 year many years ago   Vaping Use    Vaping status: Some Days    Substances: THC   Substance Use Topics    Alcohol use: Not Currently     Alcohol/week: 0.6 oz     Types: 1 Shots of liquor per week     Comment: gin and half a lime; tonic water    Drug use: Not Currently     Types: Marijuana, Inhaled     Comment: medical marijuana through bong/vape         MEDICATIONS:    Current Outpatient Medications:     prednisONE (DELTASONE) 2.5 MG Tab, Take 1 Tablet by mouth 2 times a day., Disp: 10 Tablet, Rfl: 0    naratriptan (AMERGE) 2.5 MG tablet, Take 1 Tablet by mouth as needed for Migraine., Disp: 5 Tablet, Rfl: 3    spironolactone (ALDACTONE) 25 MG Tab, Take 1 Tablet by mouth every day., Disp: 90 Tablet, Rfl: 3    nystatin (MYCOSTATIN) 567052 UNIT/ML Suspension, Take 5 mL by mouth 4 times a day., Disp: 60 mL, Rfl: 0    ondansetron (ZOFRAN ODT) 4 MG TABLET DISPERSIBLE, Take 1 Tablet by mouth every 6 hours as needed for Nausea/Vomiting., Disp: 20 Tablet, Rfl: 0    famotidine (PEPCID) 20 MG Tab, Take 1 Tablet by mouth 1 time a day as needed (Mouth/esophageal pain)., Disp: 30 Tablet, Rfl: 0    oxybutynin  (DITROPAN) 5 MG Tab, Take 1 Tablet by mouth 3 times a day., Disp: 30 Tablet, Rfl: 0    traZODone (DESYREL) 100 MG Tab, Take 1 Tablet by mouth every evening., Disp: 90 Tablet, Rfl: 3    furosemide (LASIX) 20 MG Tab, Take 1 Tablet by mouth 1 time a day as needed (swelling)., Disp: 30 Tablet, Rfl: 0    Dextromethorphan Polistirex ER (DELSYM) 30 MG/5ML Suspension Extended Release, Take 10 mL by mouth 2 times a day., Disp: 280 mL, Rfl: 0    Ascorbic Acid (VITAMIN C GUMMIE PO), Take 2 Tablets by mouth every day., Disp: , Rfl:     Biotin-Vitamin C (HAIR SKIN NAILS GUMMIES PO), Take 2 Tablets by mouth every day., Disp: , Rfl:     Ca Phosphate-Cholecalciferol (CALCIUM/VITAMIN D3 GUMMIES PO), Take 2 Tablets by mouth every day., Disp: , Rfl:     POTASSIUM CITRATE PO, Take 2 Tablets by mouth every day. Gummy, Disp: , Rfl:     Multiple Vitamins-Minerals (MULTI-VITAMIN GUMMIES PO), Take 2 Tablets by mouth every day., Disp: , Rfl:     Probiotic Product (PROBIOTIC GUMMIES PO), Take 2 Tablets by mouth every day., Disp: , Rfl:     cetirizine (ZYRTEC) 10 MG Tab, Take 10 mg by mouth every day., Disp: , Rfl:     guaiFENesin (MUCINEX PO), Take 1 Tablet by mouth every day., Disp: , Rfl:     prochlorperazine (COMPAZINE) 10 MG Tab, Take 1 Tablet by mouth every 6 hours as needed for Nausea/Vomiting., Disp: 30 Tablet, Rfl: 3    zolpidem (AMBIEN) 10 MG Tab, Take 1 Tablet by mouth at bedtime as needed for Sleep for up to 90 days., Disp: 30 Tablet, Rfl: 2    Azelaic Acid 15 % Gel, Apply 1 Application topically every day. Apply's on face, Disp: , Rfl:     [START ON 2/17/2025] oxyCODONE immediate release (ROXICODONE) 10 MG immediate release tablet, Take 1 Tablet by mouth every 6 hours as needed for Moderate Pain (Refill #3 of #3) for up to 30 days., Disp: 90 Tablet, Rfl: 0    [START ON 1/18/2025] oxyCODONE immediate release (ROXICODONE) 10 MG immediate release tablet, Take 1 Tablet by mouth every four hours as needed for Moderate Pain (Refill #2  of #3) for up to 30 days., Disp: 90 Tablet, Rfl: 0    oxyCODONE immediate release (ROXICODONE) 10 MG immediate release tablet, Take 1 Tablet by mouth every 6 hours as needed for Moderate Pain (refill #1 of #3) for up to 30 days., Disp: 90 Tablet, Rfl: 0    amLODIPine (NORVASC) 2.5 MG Tab, Take 1 Tablet by mouth every day., Disp: 90 Tablet, Rfl: 3    cetirizine (ZYRTEC) 1 MG/ML Solution oral solution, Take 10 mL by mouth every day., Disp: 150 mL, Rfl: 3    potassium chloride SA (KDUR) 20 MEQ Tab CR, Take 1 Tablet by mouth 2 times a day., Disp: 180 Tablet, Rfl: 1    hydroxychloroquine (PLAQUENIL) 200 MG Tab, Take 1 Tablet by mouth every day., Disp: 90 Tablet, Rfl: 3    metoprolol tartrate (LOPRESSOR) 50 MG Tab, Take 1 Tablet by mouth 2 times a day., Disp: 180 Tablet, Rfl: 3    levalbuterol (XOPENEX HFA) 45 MCG/ACT inhaler, Inhale 2 Puffs every four hours as needed for Shortness of Breath (As needed for shortness of breath, cough, wheezing.)., Disp: 1 Each, Rfl: 11    PROBLEMS:  Patient Active Problem List    Diagnosis Date Noted    FLORES (acute kidney injury) (HCC) 12/04/2024    Metabolic acidosis 12/04/2024    Increased ostomy output 12/04/2024    Intractable nausea and vomiting 12/04/2024    ACP (advance care planning) 12/04/2024    Easy bruising 12/04/2024    Underweight (BMI < 18.5) 11/27/2024    Thrombocytopenia (HCC) 12/11/2023    Chronic diastolic congestive heart failure (HCC) 12/11/2023    Chronic kidney disease, stage 3b 11/22/2023    Transient alteration of awareness 10/26/2023    Achalasia 10/18/2023    Esophageal candidiasis (HCC) 02/08/2023    Sacroiliac joint pain 11/10/2022    Esophageal stricture 10/22/2022    Dysphagia 10/21/2022    Drug-induced lupus erythematosus 09/27/2022    Long-term use of hydroxychloroquine 09/27/2022    Fibromyalgia syndrome 09/27/2022    Primary osteoarthritis of both hands 07/28/2022    Positive cardiolipin antibodies (IgA and IgG anti-CL) 07/27/2022    Positive VAHID (1:640  homogenous pattern with negative reflex) 07/27/2022    Inflammatory arthritis 07/27/2022    Mild tetrahydrocannabinol (THC) abuse 06/02/2022    Oropharyngeal dysphagia 06/02/2022    Acute pancreatitis 05/23/2022    Migraine 06/04/2019    Essential hypertension 05/20/2019    SIRS (systemic inflammatory response syndrome) ([facility]) 05/19/2019    History of MRSA infection 12/29/2018    History of infection with vancomycin resistant Enterococcus (VRE) 12/29/2018    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[facility]) 12/28/2018    Anemia 12/15/2018    Dehydration 12/12/2018    Hyponatremia 12/12/2018    Leukocytosis 12/12/2018    Anxiety disorder due to multiple medical problems 12/01/2017    DDD (degenerative disc disease), lumbar 04/12/2017    History of DVT (deep vein thrombosis) 11/23/2016    Atrial fibrillation ([facility]) 03/26/2015    Sleep apnea 10/26/2014    Postherpetic neuralgia 06/24/2014    Multiple falls 06/24/2014    COPD (chronic obstructive pulmonary disease) ([facility]) 06/24/2014    Rosacea 06/24/2014    Ileostomy in place ([facility]) 06/26/2013    GERD (gastroesophageal reflux disease) 12/05/2012    Status post lumbar surgery 07/16/2012    Crohn's disease of small intestine with complication ([facility]) 09/23/2009    Central sensitization to pain 09/23/2009    Opioid type dependence, continuous (CMS-[facility]) 09/23/2009    Degenerative joint disease of cervical and lumbar spine 09/23/2009       REVIEW OF SYSTEMS:  Gen.:  No Nausea, Vomiting, fever, Chills.  Heart: No chest pain.  Lungs:  No shortness of Breath.  Psychological: Rg unusual Anxiety depression     PHYSICAL EXAM      Constitutional: Alert, cooperative, not in acute distress.  Cardiovascular:  Rate Rhythm is regular without murmurs rubs clicks.     Thorax & Lungs: Clear to auscultation, no wheezing, rhonchi, or rales  HENT: Normocephalic, Atraumatic.  Eyes: PERRLA, EOMI, Conjunctiva normal.   Neck: Trachia is midline no swelling of the thyroid.   Lymphatic: No lymphadenopathy noted.   Neurologic:  Alert & oriented x 3, cranial nerves II through XII are intact, Normal motor function, Normal sensory function, No focal deficits noted.   Psychiatric: Affect normal, Judgment normal, Mood normal.     VITAL SIGNS:/60 (BP Location: Left arm, Patient Position: Sitting, BP Cuff Size: Adult)   Pulse 83   Temp 36.5 °C (97.7 °F) (Temporal)   Resp 12   Ht 1.829 m (6')   Wt 62 kg (136 lb 9.6 oz)   SpO2 97%   BMI 18.53 kg/m²     Labs: Reviewed    Assessment:                                                     Plan:     1. Allergic reaction, initial encounter  Allergic reaction initial encounter patient is to use prednisone 2.5 mg 1 tablet twice daily  - prednisONE (DELTASONE) 2.5 MG Tab; Take 1 Tablet by mouth 2 times a day.  Dispense: 10 Tablet; Refill: 0

## 2025-01-02 ENCOUNTER — PATIENT MESSAGE (OUTPATIENT)
Dept: MEDICAL GROUP | Facility: LAB | Age: 70
End: 2025-01-02
Payer: MEDICARE

## 2025-01-02 DIAGNOSIS — T78.40XA ALLERGIC REACTION, INITIAL ENCOUNTER: ICD-10-CM

## 2025-01-02 RX ORDER — NARATRIPTAN 2.5 MG/1
2.5 TABLET ORAL PRN
Qty: 5 TABLET | Refills: 3 | Status: SHIPPED | OUTPATIENT
Start: 2025-01-02

## 2025-01-02 RX ORDER — PREDNISONE 2.5 MG/1
2.5 TABLET ORAL 2 TIMES DAILY
Qty: 10 TABLET | Refills: 0 | Status: SHIPPED | OUTPATIENT
Start: 2025-01-02

## 2025-01-03 ENCOUNTER — PATIENT MESSAGE (OUTPATIENT)
Dept: MEDICAL GROUP | Facility: LAB | Age: 70
End: 2025-01-03
Payer: MEDICARE

## 2025-01-03 NOTE — TELEPHONE ENCOUNTER
Received request via: Patient    Was the patient seen in the last year in this department? Yes    Does the patient have an active prescription (recently filled or refills available) for medication(s) requested? No    Pharmacy Name:   [id number] - [place], [place] - [address] [facility]  [address] [facility] [place] [address]  Phone: [phone] Fax: [phone]          Does the patient have long term Plus and need 100-day supply? (This applies to ALL medications) Patient does not have SCP

## 2025-01-07 ENCOUNTER — PATIENT MESSAGE (OUTPATIENT)
Dept: MEDICAL GROUP | Facility: LAB | Age: 70
End: 2025-01-07
Payer: MEDICARE

## 2025-01-07 DIAGNOSIS — M54.2 NECK PAIN: ICD-10-CM

## 2025-01-10 ENCOUNTER — PATIENT MESSAGE (OUTPATIENT)
Dept: MEDICAL GROUP | Facility: LAB | Age: 70
End: 2025-01-10
Payer: MEDICARE

## 2025-01-13 ENCOUNTER — APPOINTMENT (OUTPATIENT)
Dept: MEDICAL GROUP | Facility: LAB | Age: 70
End: 2025-01-13
Payer: MEDICARE

## 2025-01-13 VITALS
HEART RATE: 88 BPM | RESPIRATION RATE: 16 BRPM | WEIGHT: 131 LBS | OXYGEN SATURATION: 96 % | HEIGHT: 72 IN | TEMPERATURE: 96.6 F | BODY MASS INDEX: 17.74 KG/M2 | SYSTOLIC BLOOD PRESSURE: 122 MMHG | DIASTOLIC BLOOD PRESSURE: 82 MMHG

## 2025-01-13 DIAGNOSIS — R11.2 NAUSEA AND VOMITING, UNSPECIFIED VOMITING TYPE: ICD-10-CM

## 2025-01-13 DIAGNOSIS — R25.2 SPASM: ICD-10-CM

## 2025-01-13 DIAGNOSIS — K22.2 GERD WITH STRICTURE: ICD-10-CM

## 2025-01-13 DIAGNOSIS — K21.9 GERD WITH STRICTURE: ICD-10-CM

## 2025-01-13 DIAGNOSIS — F51.01 PRIMARY INSOMNIA: ICD-10-CM

## 2025-01-13 PROCEDURE — 99213 OFFICE O/P EST LOW 20 MIN: CPT | Performed by: INTERNAL MEDICINE

## 2025-01-13 PROCEDURE — 3074F SYST BP LT 130 MM HG: CPT | Performed by: INTERNAL MEDICINE

## 2025-01-13 PROCEDURE — 3079F DIAST BP 80-89 MM HG: CPT | Performed by: INTERNAL MEDICINE

## 2025-01-13 RX ORDER — ONDANSETRON 4 MG/1
4 TABLET, ORALLY DISINTEGRATING ORAL EVERY 6 HOURS PRN
Qty: 20 TABLET | Refills: 5 | Status: SHIPPED | OUTPATIENT
Start: 2025-01-13

## 2025-01-13 RX ORDER — DIAZEPAM 5 MG/1
5 TABLET ORAL EVERY 6 HOURS PRN
Qty: 30 TABLET | Refills: 2 | Status: SHIPPED | OUTPATIENT
Start: 2025-01-13 | End: 2025-01-13 | Stop reason: SDUPTHER

## 2025-01-13 RX ORDER — ZOLPIDEM TARTRATE 10 MG/1
10 TABLET ORAL NIGHTLY PRN
Qty: 30 TABLET | Refills: 2 | Status: SHIPPED | OUTPATIENT
Start: 2025-01-13 | End: 2025-04-13

## 2025-01-13 RX ORDER — DIAZEPAM 5 MG/1
5 TABLET ORAL EVERY 6 HOURS PRN
Qty: 30 TABLET | Refills: 2 | Status: SHIPPED | OUTPATIENT
Start: 2025-01-13 | End: 2025-02-12

## 2025-01-13 RX ORDER — DIAZEPAM 5 MG/1
5 TABLET ORAL EVERY 6 HOURS PRN
Qty: 30 TABLET | Refills: 2 | Status: SHIPPED | OUTPATIENT
Start: 2025-01-13 | End: 2025-01-13

## 2025-01-13 ASSESSMENT — FIBROSIS 4 INDEX: FIB4 SCORE: 1.47

## 2025-01-14 ENCOUNTER — APPOINTMENT (OUTPATIENT)
Dept: MEDICAL GROUP | Facility: LAB | Age: 70
End: 2025-01-14
Payer: MEDICARE

## 2025-01-14 NOTE — PROGRESS NOTES
CC: Jana Overton is a 69 y.o. female is suffering from   Chief Complaint   Patient presents with    Neck Pain    Back Pain     Radiates down legs     Fall     Two weeks     Migraine     Light sensitivity          SUBJECTIVE:  1. Wan Bates is here for follow-up, continues to have problems with esophagitis, is also having problems with spasm, is to continue on Valium as necessary        Past social, family, history: , Goyo, estranged  Social History     Tobacco Use    Smoking status: Never    Smokeless tobacco: Never    Tobacco comments:     second hand smoke parents - smoked for only 1 year many years ago   Vaping Use    Vaping status: Some Days    Substances: THC   Substance Use Topics    Alcohol use: Not Currently     Alcohol/week: 0.6 oz     Types: 1 Shots of liquor per week     Comment: gin and half a lime; tonic water    Drug use: Not Currently     Types: Marijuana, Inhaled     Comment: medical marijuana through bong/vape         MEDICATIONS:    Current Outpatient Medications:     diazePAM (VALIUM) 5 MG Tab, Take 1 Tablet by mouth every 6 hours as needed for Anxiety for up to 90 days., Disp: 30 Tablet, Rfl: 2    zolpidem (AMBIEN) 10 MG Tab, Take 1 Tablet by mouth at bedtime as needed for Sleep for up to 90 days., Disp: 30 Tablet, Rfl: 2    ondansetron (ZOFRAN ODT) 4 MG TABLET DISPERSIBLE, Take 1 Tablet by mouth every 6 hours as needed for Nausea/Vomiting., Disp: 20 Tablet, Rfl: 5    naratriptan (AMERGE) 2.5 MG tablet, Take 1 Tablet by mouth as needed for Migraine., Disp: 5 Tablet, Rfl: 3    prednisONE (DELTASONE) 2.5 MG Tab, Take 1 Tablet by mouth 2 times a day., Disp: 10 Tablet, Rfl: 0    spironolactone (ALDACTONE) 25 MG Tab, Take 1 Tablet by mouth every day., Disp: 90 Tablet, Rfl: 3    nystatin (MYCOSTATIN) 633356 UNIT/ML Suspension, Take 5 mL by mouth 4 times a day., Disp: 60 mL, Rfl: 0    famotidine (PEPCID) 20 MG Tab, Take 1 Tablet by mouth 1 time a day as needed (Mouth/esophageal pain).,  Disp: 30 Tablet, Rfl: 0    oxybutynin (DITROPAN) 5 MG Tab, Take 1 Tablet by mouth 3 times a day., Disp: 30 Tablet, Rfl: 0    traZODone (DESYREL) 100 MG Tab, Take 1 Tablet by mouth every evening., Disp: 90 Tablet, Rfl: 3    furosemide (LASIX) 20 MG Tab, Take 1 Tablet by mouth 1 time a day as needed (swelling)., Disp: 30 Tablet, Rfl: 0    Dextromethorphan Polistirex ER (DELSYM) 30 MG/5ML Suspension Extended Release, Take 10 mL by mouth 2 times a day., Disp: 280 mL, Rfl: 0    Ascorbic Acid (VITAMIN C GUMMIE PO), Take 2 Tablets by mouth every day., Disp: , Rfl:     Biotin-Vitamin C (HAIR SKIN NAILS GUMMIES PO), Take 2 Tablets by mouth every day., Disp: , Rfl:     Ca Phosphate-Cholecalciferol (CALCIUM/VITAMIN D3 GUMMIES PO), Take 2 Tablets by mouth every day., Disp: , Rfl:     POTASSIUM CITRATE PO, Take 2 Tablets by mouth every day. Gummy, Disp: , Rfl:     Multiple Vitamins-Minerals (MULTI-VITAMIN GUMMIES PO), Take 2 Tablets by mouth every day., Disp: , Rfl:     Probiotic Product (PROBIOTIC GUMMIES PO), Take 2 Tablets by mouth every day., Disp: , Rfl:     cetirizine (ZYRTEC) 10 MG Tab, Take 10 mg by mouth every day., Disp: , Rfl:     guaiFENesin (MUCINEX PO), Take 1 Tablet by mouth every day., Disp: , Rfl:     prochlorperazine (COMPAZINE) 10 MG Tab, Take 1 Tablet by mouth every 6 hours as needed for Nausea/Vomiting., Disp: 30 Tablet, Rfl: 3    Azelaic Acid 15 % Gel, Apply 1 Application topically every day. Apply's on face, Disp: , Rfl:     [START ON 2/17/2025] oxyCODONE immediate release (ROXICODONE) 10 MG immediate release tablet, Take 1 Tablet by mouth every 6 hours as needed for Moderate Pain (Refill #3 of #3) for up to 30 days., Disp: 90 Tablet, Rfl: 0    [START ON 1/18/2025] oxyCODONE immediate release (ROXICODONE) 10 MG immediate release tablet, Take 1 Tablet by mouth every four hours as needed for Moderate Pain (Refill #2 of #3) for up to 30 days., Disp: 90 Tablet, Rfl: 0    oxyCODONE immediate release  (ROXICODONE) 10 MG immediate release tablet, Take 1 Tablet by mouth every 6 hours as needed for Moderate Pain (refill #1 of #3) for up to 30 days., Disp: 90 Tablet, Rfl: 0    amLODIPine (NORVASC) 2.5 MG Tab, Take 1 Tablet by mouth every day., Disp: 90 Tablet, Rfl: 3    cetirizine (ZYRTEC) 1 MG/ML Solution oral solution, Take 10 mL by mouth every day., Disp: 150 mL, Rfl: 3    potassium chloride SA (KDUR) 20 MEQ Tab CR, Take 1 Tablet by mouth 2 times a day., Disp: 180 Tablet, Rfl: 1    hydroxychloroquine (PLAQUENIL) 200 MG Tab, Take 1 Tablet by mouth every day., Disp: 90 Tablet, Rfl: 3    metoprolol tartrate (LOPRESSOR) 50 MG Tab, Take 1 Tablet by mouth 2 times a day., Disp: 180 Tablet, Rfl: 3    levalbuterol (XOPENEX HFA) 45 MCG/ACT inhaler, Inhale 2 Puffs every four hours as needed for Shortness of Breath (As needed for shortness of breath, cough, wheezing.)., Disp: 1 Each, Rfl: 11    PROBLEMS:  Patient Active Problem List    Diagnosis Date Noted    FLORES (acute kidney injury) (HCC) 12/04/2024    Metabolic acidosis 12/04/2024    Increased ostomy output 12/04/2024    Intractable nausea and vomiting 12/04/2024    ACP (advance care planning) 12/04/2024    Easy bruising 12/04/2024    Underweight (BMI < 18.5) 11/27/2024    Thrombocytopenia (HCC) 12/11/2023    Chronic diastolic congestive heart failure (HCC) 12/11/2023    Chronic kidney disease, stage 3b 11/22/2023    Transient alteration of awareness 10/26/2023    Achalasia 10/18/2023    Esophageal candidiasis (HCC) 02/08/2023    Sacroiliac joint pain 11/10/2022    Esophageal stricture 10/22/2022    Dysphagia 10/21/2022    Drug-induced lupus erythematosus 09/27/2022    Long-term use of hydroxychloroquine 09/27/2022    Fibromyalgia syndrome 09/27/2022    Primary osteoarthritis of both hands 07/28/2022    Positive cardiolipin antibodies (IgA and IgG anti-CL) 07/27/2022    Positive VAHID (1:640 homogenous pattern with negative reflex) 07/27/2022    Inflammatory arthritis  07/27/2022    Mild tetrahydrocannabinol (THC) abuse 06/02/2022    Oropharyngeal dysphagia 06/02/2022    Acute pancreatitis 05/23/2022    Migraine 06/04/2019    Essential hypertension 05/20/2019    SIRS (systemic inflammatory response syndrome) ([facility]) 05/19/2019    History of MRSA infection 12/29/2018    History of infection with vancomycin resistant Enterococcus (VRE) 12/29/2018    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[facility]) 12/28/2018    Anemia 12/15/2018    Dehydration 12/12/2018    Hyponatremia 12/12/2018    Leukocytosis 12/12/2018    Anxiety disorder due to multiple medical problems 12/01/2017    DDD (degenerative disc disease), lumbar 04/12/2017    History of DVT (deep vein thrombosis) 11/23/2016    Atrial fibrillation ([facility]) 03/26/2015    Sleep apnea 10/26/2014    Postherpetic neuralgia 06/24/2014    Multiple falls 06/24/2014    COPD (chronic obstructive pulmonary disease) ([facility]) 06/24/2014    Rosacea 06/24/2014    Ileostomy in place ([facility]) 06/26/2013    GERD (gastroesophageal reflux disease) 12/05/2012    Status post lumbar surgery 07/16/2012    Crohn's disease of small intestine with complication ([facility]) 09/23/2009    Central sensitization to pain 09/23/2009    Opioid type dependence, continuous (CMS-HCC) 09/23/2009    Degenerative joint disease of cervical and lumbar spine 09/23/2009       REVIEW OF SYSTEMS:  Gen.:  No Nausea, Vomiting, fever, Chills.  Heart: No chest pain.  Lungs:  No shortness of Breath.  Psychological: Rg unusual Anxiety depression     PHYSICAL EXAM      Constitutional: Alert, cooperative, not in acute distress.  Cardiovascular:  Rate Rhythm is regular without murmurs rubs clicks.     Thorax & Lungs: Clear to auscultation, no wheezing, rhonchi, or rales  HENT: Normocephalic, Atraumatic.  Eyes: PERRLA, EOMI, Conjunctiva normal.   Neck: Trachia is midline no swelling of the thyroid.   Lymphatic: No lymphadenopathy noted.   Neurologic: Alert & oriented x 3, cranial nerves II through XII are intact, Normal motor  function, Normal sensory function, No focal deficits noted.   Psychiatric: Affect normal, Judgment normal, Mood normal.     VITAL SIGNS:/82 (BP Location: Left arm, Patient Position: Sitting, BP Cuff Size: Adult)   Pulse 88   Temp 35.9 °C (96.6 °F) (Temporal)   Resp 16   Ht 1.829 m (6')   Wt 59.4 kg (131 lb)   SpO2 96%   BMI 17.77 kg/m²     Labs: Reviewed    Assessment:                                                     Plan:     1. Spasm  History of spasm, continue with Valium as necessary  - diazePAM (VALIUM) 5 MG Tab; Take 1 Tablet by mouth every 6 hours as needed for Anxiety for up to 90 days.  Dispense: 30 Tablet; Refill: 2

## 2025-01-15 ENCOUNTER — PATIENT MESSAGE (OUTPATIENT)
Dept: MEDICAL GROUP | Facility: LAB | Age: 70
End: 2025-01-15
Payer: MEDICARE

## 2025-01-15 DIAGNOSIS — M51.360 DEGENERATION OF INTERVERTEBRAL DISC OF LUMBAR REGION WITH DISCOGENIC BACK PAIN: ICD-10-CM

## 2025-01-22 ENCOUNTER — HOSPITAL ENCOUNTER (OUTPATIENT)
Dept: LAB | Facility: MEDICAL CENTER | Age: 70
End: 2025-01-22
Attending: STUDENT IN AN ORGANIZED HEALTH CARE EDUCATION/TRAINING PROGRAM
Payer: MEDICARE

## 2025-01-22 DIAGNOSIS — L93.2 DRUG-INDUCED LUPUS ERYTHEMATOSUS: ICD-10-CM

## 2025-01-22 DIAGNOSIS — T50.905A DRUG-INDUCED LUPUS ERYTHEMATOSUS: ICD-10-CM

## 2025-01-22 LAB
ALBUMIN SERPL BCP-MCNC: 4.2 G/DL (ref 3.2–4.9)
ALBUMIN/GLOB SERPL: 1.3 G/DL
ALP SERPL-CCNC: 107 U/L (ref 30–99)
ALT SERPL-CCNC: 27 U/L (ref 2–50)
ANION GAP SERPL CALC-SCNC: 15 MMOL/L (ref 7–16)
AST SERPL-CCNC: 41 U/L (ref 12–45)
BILIRUB SERPL-MCNC: 0.4 MG/DL (ref 0.1–1.5)
BUN SERPL-MCNC: 13 MG/DL (ref 8–22)
CALCIUM ALBUM COR SERPL-MCNC: 9.5 MG/DL (ref 8.5–10.5)
CALCIUM SERPL-MCNC: 9.7 MG/DL (ref 8.5–10.5)
CHLORIDE SERPL-SCNC: 99 MMOL/L (ref 96–112)
CO2 SERPL-SCNC: 21 MMOL/L (ref 20–33)
CREAT SERPL-MCNC: 0.92 MG/DL (ref 0.5–1.4)
CRP SERPL HS-MCNC: 0.66 MG/DL (ref 0–0.75)
GFR SERPLBLD CREATININE-BSD FMLA CKD-EPI: 66 ML/MIN/1.73 M 2
GLOBULIN SER CALC-MCNC: 3.3 G/DL (ref 1.9–3.5)
GLUCOSE SERPL-MCNC: 80 MG/DL (ref 65–99)
POTASSIUM SERPL-SCNC: 3.9 MMOL/L (ref 3.6–5.5)
PROT SERPL-MCNC: 7.5 G/DL (ref 6–8.2)
SODIUM SERPL-SCNC: 135 MMOL/L (ref 135–145)

## 2025-01-22 PROCEDURE — 80053 COMPREHEN METABOLIC PANEL: CPT

## 2025-01-22 PROCEDURE — 36415 COLL VENOUS BLD VENIPUNCTURE: CPT

## 2025-01-22 PROCEDURE — 86140 C-REACTIVE PROTEIN: CPT

## 2025-01-23 ENCOUNTER — HOSPITAL ENCOUNTER (OUTPATIENT)
Dept: LAB | Facility: MEDICAL CENTER | Age: 70
End: 2025-01-23
Attending: INTERNAL MEDICINE
Payer: MEDICARE

## 2025-01-23 ENCOUNTER — HOSPITAL ENCOUNTER (OUTPATIENT)
Dept: LAB | Facility: MEDICAL CENTER | Age: 70
End: 2025-01-23
Attending: STUDENT IN AN ORGANIZED HEALTH CARE EDUCATION/TRAINING PROGRAM
Payer: MEDICARE

## 2025-01-23 DIAGNOSIS — K50.019 CROHN'S DISEASE OF SMALL INTESTINE WITH COMPLICATION (HCC): ICD-10-CM

## 2025-01-23 DIAGNOSIS — Z93.2 ILEOSTOMY IN PLACE (HCC): ICD-10-CM

## 2025-01-23 LAB
ALBUMIN SERPL BCP-MCNC: 4.1 G/DL (ref 3.2–4.9)
ALBUMIN/GLOB SERPL: 1.2 G/DL
ALP SERPL-CCNC: 104 U/L (ref 30–99)
ALT SERPL-CCNC: 25 U/L (ref 2–50)
ANION GAP SERPL CALC-SCNC: 12 MMOL/L (ref 7–16)
AST SERPL-CCNC: 34 U/L (ref 12–45)
BASOPHILS # BLD AUTO: 0.5 % (ref 0–1.8)
BASOPHILS # BLD AUTO: 0.6 % (ref 0–1.8)
BASOPHILS # BLD: 0.04 K/UL (ref 0–0.12)
BASOPHILS # BLD: 0.04 K/UL (ref 0–0.12)
BILIRUB SERPL-MCNC: 0.5 MG/DL (ref 0.1–1.5)
BUN SERPL-MCNC: 16 MG/DL (ref 8–22)
CALCIUM ALBUM COR SERPL-MCNC: 9.9 MG/DL (ref 8.5–10.5)
CALCIUM SERPL-MCNC: 10 MG/DL (ref 8.5–10.5)
CHLORIDE SERPL-SCNC: 101 MMOL/L (ref 96–112)
CO2 SERPL-SCNC: 23 MMOL/L (ref 20–33)
CREAT SERPL-MCNC: 0.99 MG/DL (ref 0.5–1.4)
EOSINOPHIL # BLD AUTO: 0.1 K/UL (ref 0–0.51)
EOSINOPHIL # BLD AUTO: 0.11 K/UL (ref 0–0.51)
EOSINOPHIL NFR BLD: 1.4 % (ref 0–6.9)
EOSINOPHIL NFR BLD: 1.5 % (ref 0–6.9)
ERYTHROCYTE [DISTWIDTH] IN BLOOD BY AUTOMATED COUNT: 44.7 FL (ref 35.9–50)
ERYTHROCYTE [DISTWIDTH] IN BLOOD BY AUTOMATED COUNT: 44.9 FL (ref 35.9–50)
ERYTHROCYTE [SEDIMENTATION RATE] IN BLOOD BY WESTERGREN METHOD: 34 MM/HOUR (ref 0–25)
GFR SERPLBLD CREATININE-BSD FMLA CKD-EPI: 61 ML/MIN/1.73 M 2
GLOBULIN SER CALC-MCNC: 3.4 G/DL (ref 1.9–3.5)
GLUCOSE SERPL-MCNC: 89 MG/DL (ref 65–99)
HCT VFR BLD AUTO: 40 % (ref 37–47)
HCT VFR BLD AUTO: 40.6 % (ref 37–47)
HGB BLD-MCNC: 13 G/DL (ref 12–16)
HGB BLD-MCNC: 13 G/DL (ref 12–16)
IMM GRANULOCYTES # BLD AUTO: 0.02 K/UL (ref 0–0.11)
IMM GRANULOCYTES # BLD AUTO: 0.02 K/UL (ref 0–0.11)
IMM GRANULOCYTES NFR BLD AUTO: 0.3 % (ref 0–0.9)
IMM GRANULOCYTES NFR BLD AUTO: 0.3 % (ref 0–0.9)
LYMPHOCYTES # BLD AUTO: 0.98 K/UL (ref 1–4.8)
LYMPHOCYTES # BLD AUTO: 1.02 K/UL (ref 1–4.8)
LYMPHOCYTES NFR BLD: 13.5 % (ref 22–41)
LYMPHOCYTES NFR BLD: 13.7 % (ref 22–41)
MCH RBC QN AUTO: 30.4 PG (ref 27–33)
MCH RBC QN AUTO: 30.9 PG (ref 27–33)
MCHC RBC AUTO-ENTMCNC: 32 G/DL (ref 32.2–35.5)
MCHC RBC AUTO-ENTMCNC: 32.5 G/DL (ref 32.2–35.5)
MCV RBC AUTO: 94.9 FL (ref 81.4–97.8)
MCV RBC AUTO: 95 FL (ref 81.4–97.8)
MONOCYTES # BLD AUTO: 0.8 K/UL (ref 0–0.85)
MONOCYTES # BLD AUTO: 0.86 K/UL (ref 0–0.85)
MONOCYTES NFR BLD AUTO: 11 % (ref 0–13.4)
MONOCYTES NFR BLD AUTO: 11.6 % (ref 0–13.4)
NEUTROPHILS # BLD AUTO: 5.33 K/UL (ref 1.82–7.42)
NEUTROPHILS # BLD AUTO: 5.38 K/UL (ref 1.82–7.42)
NEUTROPHILS NFR BLD: 72.4 % (ref 44–72)
NEUTROPHILS NFR BLD: 73.2 % (ref 44–72)
NRBC # BLD AUTO: 0 K/UL
NRBC # BLD AUTO: 0 K/UL
NRBC BLD-RTO: 0 /100 WBC (ref 0–0.2)
NRBC BLD-RTO: 0 /100 WBC (ref 0–0.2)
PLATELET # BLD AUTO: 113 K/UL (ref 164–446)
PLATELET # BLD AUTO: 118 K/UL (ref 164–446)
PLATELET # BLD AUTO: 184 K/UL (ref 164–446)
PLATELET # BLD AUTO: 192 K/UL (ref 164–446)
PMV BLD AUTO: 10 FL (ref 9–12.9)
PMV BLD AUTO: 10.2 FL (ref 9–12.9)
POTASSIUM SERPL-SCNC: 4.3 MMOL/L (ref 3.6–5.5)
PROT SERPL-MCNC: 7.5 G/DL (ref 6–8.2)
RBC # BLD AUTO: 4.21 M/UL (ref 4.2–5.4)
RBC # BLD AUTO: 4.28 M/UL (ref 4.2–5.4)
SODIUM SERPL-SCNC: 136 MMOL/L (ref 135–145)
WBC # BLD AUTO: 7.3 K/UL (ref 4.8–10.8)
WBC # BLD AUTO: 7.4 K/UL (ref 4.8–10.8)

## 2025-01-23 PROCEDURE — 85652 RBC SED RATE AUTOMATED: CPT

## 2025-01-23 PROCEDURE — 85025 COMPLETE CBC W/AUTO DIFF WBC: CPT | Mod: 91

## 2025-01-23 PROCEDURE — 36415 COLL VENOUS BLD VENIPUNCTURE: CPT

## 2025-01-23 PROCEDURE — 85025 COMPLETE CBC W/AUTO DIFF WBC: CPT

## 2025-01-23 PROCEDURE — 80053 COMPREHEN METABOLIC PANEL: CPT

## 2025-01-27 ENCOUNTER — APPOINTMENT (OUTPATIENT)
Dept: URBAN - METROPOLITAN AREA CLINIC 20 | Facility: CLINIC | Age: 70
Setting detail: DERMATOLOGY
End: 2025-01-27

## 2025-01-27 DIAGNOSIS — D485 NEOPLASM OF UNCERTAIN BEHAVIOR OF SKIN: ICD-10-CM

## 2025-01-27 DIAGNOSIS — L71.8 OTHER ROSACEA: ICD-10-CM

## 2025-01-27 DIAGNOSIS — L57.0 ACTINIC KERATOSIS: ICD-10-CM

## 2025-01-27 PROBLEM — D37.01 NEOPLASM OF UNCERTAIN BEHAVIOR OF LIP: Status: ACTIVE | Noted: 2025-01-27

## 2025-01-27 PROCEDURE — 17003 DESTRUCT PREMALG LES 2-14: CPT

## 2025-01-27 PROCEDURE — ? MEDICATION COUNSELING

## 2025-01-27 PROCEDURE — 99214 OFFICE O/P EST MOD 30 MIN: CPT | Mod: 25

## 2025-01-27 PROCEDURE — 17000 DESTRUCT PREMALG LESION: CPT

## 2025-01-27 PROCEDURE — ? ADDITIONAL NOTES

## 2025-01-27 PROCEDURE — ? COUNSELING

## 2025-01-27 PROCEDURE — ? LIQUID NITROGEN

## 2025-01-27 PROCEDURE — ? PRESCRIPTION

## 2025-01-27 RX ORDER — MUPIROCIN 20 MG/G
OINTMENT TOPICAL
Qty: 22 | Refills: 0 | Status: ERX | COMMUNITY
Start: 2025-01-27

## 2025-01-27 RX ADMIN — MUPIROCIN: 20 OINTMENT TOPICAL at 00:00

## 2025-01-27 ASSESSMENT — LOCATION DETAILED DESCRIPTION DERM
LOCATION DETAILED: RIGHT CENTRAL MALAR CHEEK
LOCATION DETAILED: RIGHT ORAL COMMISSURE
LOCATION DETAILED: RIGHT SUPERIOR PREAURICULAR CHEEK
LOCATION DETAILED: RIGHT INFERIOR LATERAL MALAR CHEEK
LOCATION DETAILED: RIGHT SUPERIOR LATERAL MALAR CHEEK
LOCATION DETAILED: LEFT INFERIOR MEDIAL MALAR CHEEK
LOCATION DETAILED: RIGHT LATERAL MALAR CHEEK

## 2025-01-27 ASSESSMENT — LOCATION ZONE DERM
LOCATION ZONE: FACE
LOCATION ZONE: LIP

## 2025-01-27 ASSESSMENT — LOCATION SIMPLE DESCRIPTION DERM
LOCATION SIMPLE: RIGHT CHEEK
LOCATION SIMPLE: RIGHT LIP
LOCATION SIMPLE: LEFT CHEEK

## 2025-01-27 NOTE — PROCEDURE: ADDITIONAL NOTES
Additional Notes: Does not appear as HSV lesion-pt denies h/o cold sores although  she has had acyclovir in the past for laser. Tried I & D with only scant amount of blood noted. Pt will contact us by Friday and let us know how lesion is healing. Consider bx
Render Risk Assessment In Note?: no
Detail Level: Detailed

## 2025-01-27 NOTE — PROCEDURE: LIQUID NITROGEN
Render Note In Bullet Format When Appropriate: No
Show Aperture Variable?: Yes
Detail Level: Detailed
Number Of Freeze-Thaw Cycles: 2 freeze-thaw cycles
Duration Of Freeze Thaw-Cycle (Seconds): 10
Post-Care Instructions: I reviewed with the patient in detail post-care instructions. Patient is to wear sunprotection, and avoid picking at any of the treated lesions. Pt may apply Vaseline to crusted or scabbing areas.
Consent: The patient's consent was obtained including but not limited to risks of crusting, scabbing, blistering, scarring, darker or lighter pigmentary change, recurrence, incomplete removal and infection. RTC in 2 months if lesion(s) persistent.

## 2025-01-27 NOTE — HPI: SKIN LESION
Is This A New Presentation, Or A Follow-Up?: Skin Lesion
How Severe Is Your Skin Lesion?: moderate
Has Your Skin Lesion Been Treated?: not been treated
Additional History: pt states she received her shingle vaccine on Friday and has felt flu like all weekend. Noticed white bump last night. No blisters

## 2025-01-29 ENCOUNTER — APPOINTMENT (OUTPATIENT)
Dept: URBAN - METROPOLITAN AREA CLINIC 36 | Facility: CLINIC | Age: 70
Setting detail: DERMATOLOGY
End: 2025-01-29

## 2025-01-29 DIAGNOSIS — Z41.9 ENCOUNTER FOR PROCEDURE FOR PURPOSES OTHER THAN REMEDYING HEALTH STATE, UNSPECIFIED: ICD-10-CM

## 2025-01-29 DIAGNOSIS — Z48.817 ENCOUNTER FOR SURGICAL AFTERCARE FOLLOWING SURGERY ON THE SKIN AND SUBCUTANEOUS TISSUE: ICD-10-CM

## 2025-01-29 PROCEDURE — ? CO2RE

## 2025-01-29 PROCEDURE — ? POST-OP WOUND CHECK

## 2025-01-29 ASSESSMENT — LOCATION DETAILED DESCRIPTION DERM: LOCATION DETAILED: LEFT NASAL ALA

## 2025-01-29 ASSESSMENT — LOCATION ZONE DERM: LOCATION ZONE: NOSE

## 2025-01-29 ASSESSMENT — LOCATION SIMPLE DESCRIPTION DERM: LOCATION SIMPLE: LEFT NOSE

## 2025-01-29 NOTE — PROCEDURE: POST-OP WOUND CHECK
Detail Level: Detailed
Add 35491 Cpt? (Important Note: In 2017 The Use Of 67347 Is Being Tracked By Cms To Determine Future Global Period Reimbursement For Global Periods): no

## 2025-01-29 NOTE — PROCEDURE: CO2RE
Add Another Laser Setting?: no
Energy (Mj): 70
Other Location: left nasal ala 
Laser Mode: Light
Eye Protection Text: A corneal shield was inserted after appropriate application of topical anesthesia.
Detail Level: Simple
Laser Mode: Deep
Post-Care Instructions: I reviewed with the patient in detail post-care instructions. Patient should avoid sun until area fully healed. Pt should apply vaseline to treated areas, and remove crusts gently with water-vinegar soaks.
Other Location: left distal pretibial region
Consent: Written consent obtained, risks reviewed including but not limited to crusting, scabbing, blistering, scarring, darker or lighter pigmentary change, incomplete improvement of dyschromia, wrinkles, prolonged erythema and facial swelling, infection and bleeding.
Treatment Number: 1
Treatment Number: 2
Anesthesia Type: 1% lidocaine with epinephrine
External Cooling Fan Speed: 5
Price (Use Numbers Only, No Special Characters Or $): 0
Energy (Mj): 80

## 2025-01-30 ENCOUNTER — PATIENT MESSAGE (OUTPATIENT)
Dept: MEDICAL GROUP | Facility: LAB | Age: 70
End: 2025-01-30
Payer: MEDICARE

## 2025-01-30 DIAGNOSIS — R19.7 DIARRHEA, UNSPECIFIED TYPE: ICD-10-CM

## 2025-01-30 DIAGNOSIS — K50.019 CROHN'S DISEASE OF SMALL INTESTINE WITH COMPLICATION (HCC): ICD-10-CM

## 2025-01-30 DIAGNOSIS — K21.9 GASTROESOPHAGEAL REFLUX DISEASE WITHOUT ESOPHAGITIS: ICD-10-CM

## 2025-01-30 RX ORDER — OXYCODONE HYDROCHLORIDE 10 MG/1
10 TABLET ORAL EVERY 4 HOURS PRN
Qty: 60 TABLET | Refills: 0 | Status: SHIPPED | OUTPATIENT
Start: 2025-02-01 | End: 2025-03-03

## 2025-01-30 RX ORDER — OXYCODONE HYDROCHLORIDE 10 MG/1
10 TABLET ORAL EVERY 6 HOURS PRN
Qty: 60 TABLET | Refills: 0 | Status: SHIPPED | OUTPATIENT
Start: 2025-03-03 | End: 2025-04-02

## 2025-02-03 ENCOUNTER — APPOINTMENT (OUTPATIENT)
Dept: MEDICAL GROUP | Facility: LAB | Age: 70
End: 2025-02-03
Payer: MEDICARE

## 2025-02-03 DIAGNOSIS — E43 EDEMA DUE TO MALNUTRITION, DUE TO UNSPECIFIED MALNUTRITION TYPE (HCC): ICD-10-CM

## 2025-02-03 DIAGNOSIS — N18.32 CHRONIC KIDNEY DISEASE, STAGE 3B: ICD-10-CM

## 2025-02-03 RX ORDER — POTASSIUM CHLORIDE 1500 MG/1
20 TABLET, EXTENDED RELEASE ORAL 2 TIMES DAILY
Qty: 60 TABLET | Refills: 5 | Status: SHIPPED | OUTPATIENT
Start: 2025-02-03

## 2025-02-03 RX ORDER — FUROSEMIDE 20 MG/1
20 TABLET ORAL SEE ADMIN INSTRUCTIONS
Qty: 30 TABLET | Refills: 2 | Status: SHIPPED | OUTPATIENT
Start: 2025-02-03

## 2025-02-03 NOTE — TELEPHONE ENCOUNTER
LS: Last filled 12/09/24 at ER for 30 tablets. Pt to take PRN for BLE edema. Labs current/ stable. Please advise on quantity/ refills. Thank you!

## 2025-02-06 ENCOUNTER — PATIENT MESSAGE (OUTPATIENT)
Dept: MEDICAL GROUP | Facility: LAB | Age: 70
End: 2025-02-06
Payer: MEDICARE

## 2025-02-06 DIAGNOSIS — K22.0 ACHALASIA: ICD-10-CM

## 2025-02-12 ENCOUNTER — TELEPHONE (OUTPATIENT)
Dept: CARDIOLOGY | Facility: MEDICAL CENTER | Age: 70
End: 2025-02-12
Payer: MEDICARE

## 2025-02-12 NOTE — TELEPHONE ENCOUNTER
Last OV: 11/27/2024  Proposed Surgery: laparoscopic possible open revision of ileostomy with parastomal hernia repair   Surgery Date: 2/17/2025  Requesting Office Name: [facility]  Fax Number: [phone]  Preference of Location (default is surgery center unless specified by Cardiologist or [facility])  Prior Clearance Addressed: No    Is this a general clearance? YES   Anticoags/Antiplatelets: Other NONE   Anticoags/Antiplatelet managed by Cardiology? YES    Outstanding Cardiac Imaging : No  Ablation, Cardioversion, Stent, Cardiac Devices, Catheterization, Watchman: No  TAVR/Valve, Mitral Clip, Watchman (including open heart),: N/A   Recent Cardiac Hospitalization: No            When: N/A  History (cardiac history):   Past Medical History:   Diagnosis Date    Anesthesia     Difficult IV stick    Anxiety     Arthritis     all over    ASTHMA     Atrial fib/flut     Backpain     Bronchitis     5 years    Chronic pain     Colostomy in place (HCC)     COPD (chronic obstructive pulmonary disease) ([facility])     Crohn's disease of colon ([facility])     Dyspnea     History of cardiac murmur     Ileostomy present ([facility])     Infectious disease     MRSA, VancoRSA    Multiple falls     Narcotic dependence ([facility])     Obstruction     Pain     Pneumonia     child and mid 30's    Postherpetic neuralgia     Rosacea     Sleep apnea            Is this a dental clearance? NO  Ablation, Cardioversion, Watchman, Stents, Cath, Devices within the last 3 months? No   If yes- Send dental wait letter, do not forward to provider for review.     TAVR / Valve, Mitral clip within the last 6 months? No  If yes- Send dental wait letter, do not forward to provider for review.     If completing a general clearance, continue per protocol.           Surgical Clearance Letter Sent: YES   **Scan clearance request letter into [facility].**

## 2025-02-12 NOTE — LETTER
PROCEDURE/SURGERY CLEARANCE FORM      Encounter Date: 2/12/2025    Patient: Jana Overton  YOB: 1955    CARDIOLOGIST:  CHIKI VEGA     REFERRING DOCTOR:  No ref. provider found  The following procedure/surgery:  laparoscopic possible open revision of ileostomy with parastomal hernia repair           PROCEDURE/SURGERY CLEARANCE FORM    Date: 2/12/2025   Patient Name: Jana Overton    Dear Surgeon or Proceduralist,      Thank you for your request for cardiac stratification of our mutual patient Jana Overton 1955. We have reviewed their [facility] records; and to the best of our understanding this patient has not had stenting, ablation, watchman, cardiothoracic surgery or hospitalization for cardiovascular reasons in the past 6 months.  Jana Overton has been seen within the past 15 months and is considered to have non-modifiable cardiac risk for this low-risk procedure/surgery. They may proceed from a cardiovascular standpoint and may hold their antiplatelet/anticoagulation as briefly as possible. Please have patient resume this medication when hemodynamically stable to do so.     Aspirin or Prasugrel   - hold 7 days prior to procedure/surgery, resume when hemodynamically stable      Clopidrogrel or Ticagrelor  - hold 7 days for all neurological procedures, hold 5 days prior to all other procedure/surgery,  resume when hemodynamically stable     Warfarin - hold 7 days for all neurological procedures, hold 5 days prior to all other procedure/surgery and coordinate with [facility] ([phone]) INR testing and dose management.      Pradaxa/Xarelto/Eliquis/Savesya - hold 1 day prior to procedure for low bleeding risk procedure, 2 days for high bleeding risk procedure, or consider holding 3 days or longer for patients with reduced kidney function (CrCl <30mL/min) or spinal/cranial surgeries/procedures.      If they have a mechanical heart valve, please  coordinate with [facility] ([phone]) the proper management of their anticoagulant in the periprocedural or perioperative period.      Some patients have higher risk for cardiovascular complications or holding medication. If our patient has had prior complications of holding antiplatelet or anticoagulants in the past and we have seen them after these events, we have addressed these concerns with the patient. They are at an unknown degree of increased risk for recurrent complication.  You may hold anticoagulation/antiplatelets for the procedure or surgery if the benefits of the procedure or surgery outweigh this nonmodifiable risk.      If Jana Overton 1955 has new symptoms of heart failure decompensation, unstable arrythmia, or angina please reach out and we will assess the patient.      If you have other patient-specific concerns, please feel free to reach out to the patient's cardiologist directly at [phone].     Thank you,       [facility]

## 2025-02-12 NOTE — Clinical Note
REFERRAL APPROVAL NOTICE         Sent on February 12, 2025                   Jana Overton  [address]  [place] [address]                   Dear Ms. Overton,    After a careful review of the medical information and benefit coverage, [facility] has processed your referral. See below for additional details.    If applicable, you must be actively enrolled with your insurance for coverage of the authorized service. If you have any questions regarding your coverage, please contact your insurance directly.    REFERRAL INFORMATION   Referral #:  [id number]  Referred-To Department    Referred-By Provider:  Surgery    Chase Charles D.O.   Department Of Surgery      [address]  [place] [address]  [phone] [address], [address]  [place] [id number] [phone]    Referral Start Date:  02/06/2025  Referral End Date:   02/06/2026             SCHEDULING  If you do not already have an appointment, please call [phone] to make an appointment.     MORE INFORMATION  If you do not already have a [facility] account, sign up at: [email or web address]  You can access your medical information, make appointments, see lab results, billing information, and more.  If you have questions regarding this referral, please contact  the [facility] Referrals department at:             [phone]. Monday - Friday 8:00AM - 5:00PM.     Sincerely,    [facility]

## 2025-02-19 ENCOUNTER — APPOINTMENT (OUTPATIENT)
Dept: RHEUMATOLOGY | Facility: MEDICAL CENTER | Age: 70
End: 2025-02-19
Payer: MEDICARE

## 2025-02-27 ENCOUNTER — OFFICE VISIT (OUTPATIENT)
Dept: MEDICAL GROUP | Facility: LAB | Age: 70
End: 2025-02-27
Payer: MEDICARE

## 2025-02-27 VITALS
TEMPERATURE: 96.9 F | WEIGHT: 131.17 LBS | RESPIRATION RATE: 16 BRPM | HEART RATE: 84 BPM | OXYGEN SATURATION: 97 % | BODY MASS INDEX: 17.77 KG/M2 | HEIGHT: 72 IN | DIASTOLIC BLOOD PRESSURE: 64 MMHG | SYSTOLIC BLOOD PRESSURE: 116 MMHG

## 2025-02-27 DIAGNOSIS — Z63.5 MARITAL ESTRANGEMENT: ICD-10-CM

## 2025-02-27 DIAGNOSIS — R10.9 ABDOMINAL PAIN, UNSPECIFIED ABDOMINAL LOCATION: ICD-10-CM

## 2025-02-27 DIAGNOSIS — K22.0 ACHALASIA: ICD-10-CM

## 2025-02-27 DIAGNOSIS — Z93.3 COLOSTOMY IN PLACE (HCC): ICD-10-CM

## 2025-02-27 SDOH — SOCIAL STABILITY - SOCIAL INSECURITY: DISRUPTION OF FAMILY BY SEPARATION AND DIVORCE: Z63.5

## 2025-02-27 ASSESSMENT — FIBROSIS 4 INDEX: FIB4 SCORE: 2.62

## 2025-02-28 NOTE — PROGRESS NOTES
CC: Jana Overton is a 69 y.o. female is suffering from   Chief Complaint   Patient presents with    [facility]     Was in [facility] from 2/17-2/19, went back to ER 2/21     Abdominal Pain         SUBJECTIVE:  1. Achalasia  Jana is here for follow-up, we have discussed that she continues to have problems with achalasia and has been offered Botox treatment    2. Colostomy in place (HCC)  Patient with repeat colostomy completed by Dr. Sepulveda    3. Abdominal pain, unspecified abdominal location  [facility] abdominal pain exact etiology uncertain marital estrangement from [place]    4. Marital estrangement  Ongoing, [place]        Past social, family, history: , estranged  Social History     Tobacco Use    Smoking status: Never    Smokeless tobacco: Never    Tobacco comments:     second hand smoke parents - smoked for only 1 year many years ago   Vaping Use    Vaping status: Some Days    Substances: THC   Substance Use Topics    Alcohol use: Not Currently     Alcohol/week: 0.6 oz     Types: 1 Shots of liquor per week     Comment: gin and half a lime; tonic water    Drug use: Not Currently     Types: Marijuana, Inhaled     Comment: medical marijuana through bong/vape         MEDICATIONS:    Current Outpatient Medications:     furosemide (LASIX) 20 MG Tab, Take 1 Tablet by mouth see administration instructions. Once daily as needed for edema, Disp: 30 Tablet, Rfl: 2    potassium chloride SA (KDUR) 20 MEQ Tab CR, TAKE 1 TABLET BY MOUTH TWICE DAILY, Disp: 60 Tablet, Rfl: 5    oxyCODONE immediate release (ROXICODONE) 10 MG immediate release tablet, Take 1 Tablet by mouth every four hours as needed for Moderate Pain (Refill #2 of #3) for up to 30 days., Disp: 60 Tablet, Rfl: 0    [START ON 3/3/2025] oxyCODONE immediate release (ROXICODONE) 10 MG immediate release tablet, Take 1 Tablet by mouth every 6 hours as needed for Moderate Pain (Refill #3 of #3) for up to 30 days., Disp: 60  Tablet, Rfl: 0    zolpidem (AMBIEN) 10 MG Tab, Take 1 Tablet by mouth at bedtime as needed for Sleep for up to 90 days., Disp: 30 Tablet, Rfl: 2    ondansetron (ZOFRAN ODT) 4 MG TABLET DISPERSIBLE, Take 1 Tablet by mouth every 6 hours as needed for Nausea/Vomiting., Disp: 20 Tablet, Rfl: 5    naratriptan (AMERGE) 2.5 MG tablet, Take 1 Tablet by mouth as needed for Migraine., Disp: 5 Tablet, Rfl: 3    prednisONE (DELTASONE) 2.5 MG Tab, Take 1 Tablet by mouth 2 times a day., Disp: 10 Tablet, Rfl: 0    spironolactone (ALDACTONE) 25 MG Tab, Take 1 Tablet by mouth every day., Disp: 90 Tablet, Rfl: 3    nystatin (MYCOSTATIN) 619846 UNIT/ML Suspension, Take 5 mL by mouth 4 times a day., Disp: 60 mL, Rfl: 0    famotidine (PEPCID) 20 MG Tab, Take 1 Tablet by mouth 1 time a day as needed (Mouth/esophageal pain)., Disp: 30 Tablet, Rfl: 0    oxybutynin (DITROPAN) 5 MG Tab, Take 1 Tablet by mouth 3 times a day., Disp: 30 Tablet, Rfl: 0    traZODone (DESYREL) 100 MG Tab, Take 1 Tablet by mouth every evening., Disp: 90 Tablet, Rfl: 3    Dextromethorphan Polistirex ER (DELSYM) 30 MG/5ML Suspension Extended Release, Take 10 mL by mouth 2 times a day., Disp: 280 mL, Rfl: 0    Ascorbic Acid (VITAMIN C GUMMIE PO), Take 2 Tablets by mouth every day., Disp: , Rfl:     Biotin-Vitamin C (HAIR SKIN NAILS GUMMIES PO), Take 2 Tablets by mouth every day., Disp: , Rfl:     Ca Phosphate-Cholecalciferol (CALCIUM/VITAMIN D3 GUMMIES PO), Take 2 Tablets by mouth every day., Disp: , Rfl:     POTASSIUM CITRATE PO, Take 2 Tablets by mouth every day. Gummy, Disp: , Rfl:     Multiple Vitamins-Minerals (MULTI-VITAMIN GUMMIES PO), Take 2 Tablets by mouth every day., Disp: , Rfl:     Probiotic Product (PROBIOTIC GUMMIES PO), Take 2 Tablets by mouth every day., Disp: , Rfl:     cetirizine (ZYRTEC) 10 MG Tab, Take 10 mg by mouth every day., Disp: , Rfl:     guaiFENesin (MUCINEX PO), Take 1 Tablet by mouth every day., Disp: , Rfl:     prochlorperazine  (COMPAZINE) 10 MG Tab, Take 1 Tablet by mouth every 6 hours as needed for Nausea/Vomiting., Disp: 30 Tablet, Rfl: 3    Azelaic Acid 15 % Gel, Apply 1 Application topically every day. Apply's on face, Disp: , Rfl:     amLODIPine (NORVASC) 2.5 MG Tab, Take 1 Tablet by mouth every day., Disp: 90 Tablet, Rfl: 3    cetirizine (ZYRTEC) 1 MG/ML Solution oral solution, Take 10 mL by mouth every day., Disp: 150 mL, Rfl: 3    hydroxychloroquine (PLAQUENIL) 200 MG Tab, Take 1 Tablet by mouth every day., Disp: 90 Tablet, Rfl: 3    metoprolol tartrate (LOPRESSOR) 50 MG Tab, Take 1 Tablet by mouth 2 times a day., Disp: 180 Tablet, Rfl: 3    levalbuterol (XOPENEX HFA) 45 MCG/ACT inhaler, Inhale 2 Puffs every four hours as needed for Shortness of Breath (As needed for shortness of breath, cough, wheezing.)., Disp: 1 Each, Rfl: 11    PROBLEMS:  Patient Active Problem List    Diagnosis Date Noted    FLORES (acute kidney injury) (HCC) 12/04/2024    Metabolic acidosis 12/04/2024    Increased ostomy output 12/04/2024    Intractable nausea and vomiting 12/04/2024    ACP (advance care planning) 12/04/2024    Easy bruising 12/04/2024    Underweight (BMI < 18.5) 11/27/2024    Thrombocytopenia (HCC) 12/11/2023    Chronic diastolic congestive heart failure (HCC) 12/11/2023    Chronic kidney disease, stage 3b 11/22/2023    Transient alteration of awareness 10/26/2023    Achalasia 10/18/2023    Esophageal candidiasis (HCC) 02/08/2023    Sacroiliac joint pain 11/10/2022    Esophageal stricture 10/22/2022    Dysphagia 10/21/2022    Drug-induced lupus erythematosus 09/27/2022    Long-term use of hydroxychloroquine 09/27/2022    Fibromyalgia syndrome 09/27/2022    Primary osteoarthritis of both hands 07/28/2022    Positive cardiolipin antibodies (IgA and IgG anti-CL) 07/27/2022    Positive VAHID (1:640 homogenous pattern with negative reflex) 07/27/2022    Inflammatory arthritis 07/27/2022    Mild tetrahydrocannabinol (THC) abuse 06/02/2022     Oropharyngeal dysphagia 06/02/2022    Acute pancreatitis 05/23/2022    Migraine 06/04/2019    Essential hypertension 05/20/2019    SIRS (systemic inflammatory response syndrome) (Former[facility]) 05/19/2019    History of MRSA infection 12/29/2018    History of infection with vancomycin resistant Enterococcus (VRE) 12/29/2018    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[facility]) 12/28/2018    Anemia 12/15/2018    Dehydration 12/12/2018    Hyponatremia 12/12/2018    Leukocytosis 12/12/2018    Anxiety disorder due to multiple medical problems 12/01/2017    DDD (degenerative disc disease), lumbar 04/12/2017    History of DVT (deep vein thrombosis) 11/23/2016    Atrial fibrillation ([facility]) 03/26/2015    Sleep apnea 10/26/2014    Postherpetic neuralgia 06/24/2014    Multiple falls 06/24/2014    COPD (chronic obstructive pulmonary disease) ([facility]) 06/24/2014    Rosacea 06/24/2014    Ileostomy in place ([facility]) 06/26/2013    GERD (gastroesophageal reflux disease) 12/05/2012    Status post lumbar surgery 07/16/2012    Crohn's disease of small intestine with complication ([facility]) 09/23/2009    Central sensitization to pain 09/23/2009    Opioid type dependence, continuous (CMS-[facility]) 09/23/2009    Degenerative joint disease of cervical and lumbar spine 09/23/2009       REVIEW OF SYSTEMS:  Gen.:  No Nausea, Vomiting, fever, Chills.  Heart: No chest pain.  Lungs:  No shortness of Breath.  Psychological: Anxiety associated with her marital status     PHYSICAL EXAM      Constitutional: Alert, cooperative, not in acute distress.  Cardiovascular:  Rate Rhythm is regular without murmurs rubs clicks.     Thorax & Lungs: Clear to auscultation, no wheezing, rhonchi, or rales  HENT: Normocephalic, Atraumatic.  Eyes: PERRLA, EOMI, Conjunctiva normal.   Neck: Trachia is midline no swelling of the thyroid.   Lymphatic: No lymphadenopathy noted.   Abdomin: Diffuse tenderness to palpation ostomy left lower quadrant looks good.   Skin: Warm, Dry, No erythema, No rash.   Neurologic: Alert &  oriented x 3, cranial nerves II through XII are intact, Normal motor function, Normal sensory function, No focal deficits noted.   Psychiatric: Affect normal, Judgment normal, Mood normal.     VITAL SIGNS:/64 (BP Location: Right arm, Patient Position: Sitting, BP Cuff Size: Small adult)   Pulse 84   Temp 36.1 °C (96.9 °F) (Temporal)   Resp 16   Ht 1.829 m (6')   Wt 59.5 kg (131 lb 2.8 oz)   SpO2 97%   BMI 17.79 kg/m²     Labs: Reviewed    Assessment:                                                     Plan:     1. Achalasia  Achalasia followed by gastroenterology, consider follow-up at [facility]    2. Colostomy in place (HCC)  Ostomy in place, followed by Dr. Sepulveda    3. Abdominal pain, unspecified abdominal location  Etiology uncertain related likely to #2 above    4. Marital estrangement  Ongoing

## 2025-03-19 ENCOUNTER — OFFICE VISIT (OUTPATIENT)
Dept: RHEUMATOLOGY | Facility: MEDICAL CENTER | Age: 70
End: 2025-03-19
Attending: STUDENT IN AN ORGANIZED HEALTH CARE EDUCATION/TRAINING PROGRAM
Payer: MEDICARE

## 2025-03-19 VITALS
HEART RATE: 85 BPM | BODY MASS INDEX: 17.74 KG/M2 | OXYGEN SATURATION: 98 % | WEIGHT: 131 LBS | DIASTOLIC BLOOD PRESSURE: 64 MMHG | SYSTOLIC BLOOD PRESSURE: 120 MMHG | HEIGHT: 72 IN | TEMPERATURE: 98.6 F

## 2025-03-19 DIAGNOSIS — M79.7 FIBROMYALGIA SYNDROME: ICD-10-CM

## 2025-03-19 DIAGNOSIS — L93.2 DRUG-INDUCED LUPUS ERYTHEMATOSUS: ICD-10-CM

## 2025-03-19 DIAGNOSIS — M19.042 PRIMARY OSTEOARTHRITIS OF BOTH HANDS: ICD-10-CM

## 2025-03-19 DIAGNOSIS — T50.905A DRUG-INDUCED LUPUS ERYTHEMATOSUS: ICD-10-CM

## 2025-03-19 DIAGNOSIS — K50.019 CROHN'S DISEASE OF SMALL INTESTINE WITH COMPLICATION (HCC): ICD-10-CM

## 2025-03-19 DIAGNOSIS — M47.812 DEGENERATIVE JOINT DISEASE OF CERVICAL AND LUMBAR SPINE: ICD-10-CM

## 2025-03-19 DIAGNOSIS — Z79.899 LONG-TERM USE OF HYDROXYCHLOROQUINE: ICD-10-CM

## 2025-03-19 DIAGNOSIS — M19.041 PRIMARY OSTEOARTHRITIS OF BOTH HANDS: ICD-10-CM

## 2025-03-19 DIAGNOSIS — M47.816 DEGENERATIVE JOINT DISEASE OF CERVICAL AND LUMBAR SPINE: ICD-10-CM

## 2025-03-19 PROCEDURE — 99212 OFFICE O/P EST SF 10 MIN: CPT | Performed by: STUDENT IN AN ORGANIZED HEALTH CARE EDUCATION/TRAINING PROGRAM

## 2025-03-19 PROCEDURE — 3078F DIAST BP <80 MM HG: CPT | Performed by: STUDENT IN AN ORGANIZED HEALTH CARE EDUCATION/TRAINING PROGRAM

## 2025-03-19 PROCEDURE — 99214 OFFICE O/P EST MOD 30 MIN: CPT | Performed by: STUDENT IN AN ORGANIZED HEALTH CARE EDUCATION/TRAINING PROGRAM

## 2025-03-19 PROCEDURE — 3074F SYST BP LT 130 MM HG: CPT | Performed by: STUDENT IN AN ORGANIZED HEALTH CARE EDUCATION/TRAINING PROGRAM

## 2025-03-19 ASSESSMENT — PATIENT HEALTH QUESTIONNAIRE - PHQ9: CLINICAL INTERPRETATION OF PHQ2 SCORE: 0

## 2025-03-19 ASSESSMENT — FIBROSIS 4 INDEX: FIB4 SCORE: 2.62

## 2025-03-19 NOTE — PROGRESS NOTES
[facility] RHEUMATOLOGY  [address], [address], [place] [address]  Phone: [phone] ? Fax: [phone]  [facility]/Health-Services/Rheumatology    FOLLOW-UP VISIT NOTE         Subjective     DATE OF SERVICE: 03/19/2025    PRIMARY CARE PROVIDER:  Chase Charles D.O.  [address]  [place] [address]    PATIENT IDENTIFICATION:  Jana Overton  [address] [place] [address]    YOB: 1955    MEDICAL RECORD NUMBER: [id number]         CHIEF COMPLAINT:   Chief Complaint   Patient presents with    Follow-Up     Drug-induced lupus erythematosus       RHEUMATOLOGIC HISTORY:  Jana Overton is a 69 y.o. female with pertinent history notable for drug-induced lupus diagnosed in 7/2022 (presumably due to her past use of hydralazine and/or Remicade), Crohn's disease diagnosed in late 1990s/early 2000s s/p ileostomy/stoma, DJD/DDD of cervical/lumbar spines s/p surgical fusions, fibromyalgia/chronic pain syndrome since the 1990s (under the care of pain management), and multiple comorbidities. She initially presented on 7/27/22 for evaluation in the setting of positive VAHID that raised concern for autoimmune disease. Reported widespread joint/muscle pain involving her hands, elbows, shoulders, neck, upper/mid/lower back, knees, ankles, and feet feet. These were associated with all day joint stiffness that improved with activity but her joint/muscle pain tended to worsen with much activity such that she felt exhausted by the end of the day. She additionally reported chronic fatigue with muscle weakness, photosensitive rash on her face/extremities, Raynaud's phenomenon on her fingers/toes, dry eyes with redness/pain, dry mouth with episodic ulcers, and numerous nonspecific symptoms from multiple organ systems. She noted that she had been trying to manage with natural/alternative medicines to avoid needing to take immunosuppressive medications but experienced only modest relief until  she was started on hydroxychloroquine.     Pertinent treatments: Remicade for Crohn's disease (last infusion in early 2000s), Neurontin and Lyrica (neither of which was very helpful), hydroxychloroquine 300>200 mg daily (started 8/2022, dose decreased 11/29/23-present, effective).     Pertinent lab results history: Positive VAHID speckled<homogenous pattern 1:80<1:640 (in 10/2023<7/2022); positive anti-histone 2.9 and anti-chromatin 24 (in 7/2022); positive IgA anti-CL 24 and IgG anti-CL 19 with negative IgM anti-CL (in 10/2021); positive anti-MOG 1:640 with otherwise negative autoimmune encephalitis extended panel (in 10/2023); elevated CRP 1.55 with normal ESR 20, and low eGFR 54 with creatinine 1.09 (in 7/2024).     Pertinent negative labs: Negative ANCA (in 10/2021), RF, anti-CCP, HLA-B27, anti-TPO, anti-TG, CH50, CPK, TSH, and vitamin D (in 7/2022), paraneoplastic autoantibody panel, CSF autoimmune encephalopathy panel, CSF infection analysis, syphilis, FT4 and FT3 (in 10/2023), C3 and C4 (in 11/2023), and acceptable LFTs and CBC (in 7/2024).     Pertinent XR imaging: Hands (in 7/2022) with mild osteoarthritis of PIP and DIP joints, but no evidence of inflammatory arthropath.      INTERVAL HISTORY:  Reports interval history as noted on the questionnaire below or scanned under media tab.            REVIEW OF SYSTEMS:  Except as noted in the history above, relevant review of systems with emphasis on autoimmune rheumatic diseases was otherwise negative.      CURRENT PROBLEM LIST:  Patient Active Problem List    Diagnosis Date Noted    FLORES (acute kidney injury) (HCC) 12/04/2024    Metabolic acidosis 12/04/2024    Increased ostomy output 12/04/2024    Intractable nausea and vomiting 12/04/2024    ACP (advance care planning) 12/04/2024    Easy bruising 12/04/2024    Underweight (BMI < 18.5) 11/27/2024    Thrombocytopenia (HCC) 12/11/2023    Chronic diastolic congestive heart failure (HCC) 12/11/2023    Chronic kidney  disease, stage 3b 11/22/2023    Transient alteration of awareness 10/26/2023    Achalasia 10/18/2023    Esophageal candidiasis (HCC) 02/08/2023    Sacroiliac joint pain 11/10/2022    Esophageal stricture 10/22/2022    Dysphagia 10/21/2022    Drug-induced lupus erythematosus 09/27/2022    Long-term use of hydroxychloroquine 09/27/2022    Fibromyalgia syndrome 09/27/2022    Primary osteoarthritis of both hands 07/28/2022    Positive cardiolipin antibodies (IgA and IgG anti-CL) 07/27/2022    Positive VAHID (1:640 homogenous pattern with negative reflex) 07/27/2022    Inflammatory arthritis 07/27/2022    Mild tetrahydrocannabinol (THC) abuse 06/02/2022    Oropharyngeal dysphagia 06/02/2022    Acute pancreatitis 05/23/2022    Migraine 06/04/2019    Essential hypertension 05/20/2019    SIRS (systemic inflammatory response syndrome) ([facility]) 05/19/2019    History of MRSA infection 12/29/2018    History of infection with vancomycin resistant Enterococcus (VRE) 12/29/2018    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[facility]) 12/28/2018    Anemia 12/15/2018    Dehydration 12/12/2018    Hyponatremia 12/12/2018    Leukocytosis 12/12/2018    Anxiety disorder due to multiple medical problems 12/01/2017    DDD (degenerative disc disease), lumbar 04/12/2017    History of DVT (deep vein thrombosis) 11/23/2016    Atrial fibrillation ([facility]) 03/26/2015    Sleep apnea 10/26/2014    Postherpetic neuralgia 06/24/2014    Multiple falls 06/24/2014    COPD (chronic obstructive pulmonary disease) ([facility]) 06/24/2014    Rosacea 06/24/2014    Ileostomy in place ([facility]) 06/26/2013    GERD (gastroesophageal reflux disease) 12/05/2012    Status post lumbar surgery 07/16/2012    Crohn's disease of small intestine with complication ([facility]) 09/23/2009    Central sensitization to pain 09/23/2009    Opioid type dependence, continuous ([facility]) 09/23/2009    Degenerative joint disease of cervical and lumbar spine 09/23/2009       PAST MEDICAL HISTORY:  Past Medical History:   Diagnosis Date     Anesthesia     Difficult IV stick    Anxiety     Arthritis     all over    ASTHMA     Atrial fib/flut     Backpain     Bronchitis     5 years    Chronic pain     Colostomy in place ([facility])     COPD (chronic obstructive pulmonary disease) ([facility])     Crohn's disease of colon ([facility])     Dyspnea     History of cardiac murmur     Ileostomy present ([facility])     Infectious disease     MRSA, VancoRSA    Multiple falls     Narcotic dependence ([facility])     Obstruction     Pain     Pneumonia     child and mid 30's    Postherpetic neuralgia     Rosacea     Sleep apnea        PAST SURGICAL HISTORY:  Past Surgical History:   Procedure Laterality Date    [place] UPPER GI ENDOSCOPY,DIAGNOSIS N/A 12/7/2024    Procedure: EGD WITH BOTOX INJECTION;  Surgeon: Stuart Payan D.O.;  Location: SURGERY [facility];  Service: Gastroenterology    [place] UPPER GI ENDOSCOPY,DIAGNOSIS N/A 2/8/2023    Procedure: GASTROSCOPY;  Surgeon: Jamarcus Davalos M.D.;  Location: SURGERY SAME DAY [facility];  Service: Gastroenterology    [place] UPPER GI ENDOSCOPY,W/DILAT,GASTRIC OUT N/A 2/8/2023    Procedure: GASTROSCOPY, WITH BALLOON DILATION;  Surgeon: Jamarcus Davalos M.D.;  Location: SURGERY SAME DAY [facility];  Service: Gastroenterology    [place] UPPER GI ENDOSCOPY,BIOPSY N/A 2/8/2023    Procedure: GASTROSCOPY, WITH BIOPSY;  Surgeon: Jamarcus Davalos M.D.;  Location: SURGERY SAME DAY [facility];  Service: Gastroenterology    [place] UPPER GI ENDOSCOPY,DIAGNOSIS N/A 1/16/2023    Procedure: GASTROSCOPY;  Surgeon: Jamarcus Davalos M.D.;  Location: SURGERY SAME DAY [facility];  Service: Gastroenterology    [place] UPPER GI ENDOSCOPY,W/DILAT,GASTRIC OUT N/A 1/16/2023    Procedure: GASTROSCOPY, WITH BALLOON DILATION;  Surgeon: Jamarcus Davalos M.D.;  Location: SURGERY SAME DAY [facility];  Service: Gastroenterology    [place] UPPER GI ENDOSCOPY,BIOPSY N/A 1/16/2023    Procedure: GASTROSCOPY, WITH BIOPSY;  Surgeon: Jamarcus Davalos M.D.;  Location: SURGERY SAME DAY [facility];  Service: Gastroenterology    [place] UPPER GI  ENDOSCOPY,DIAGNOSIS N/A 10/24/2022    Procedure: GASTROSCOPY;  Surgeon: Jamarcus Davalos M.D.;  Location: SURGERY SAME DAY [facility];  Service: Gastroenterology    BILIARY DILATATION N/A 10/24/2022    Procedure: DILATION, STRICTURE, BILE DUCT;  Surgeon: Jamarcus Davalos M.D.;  Location: SURGERY SAME DAY [facility];  Service: Gastroenterology    AR CYSTOSCOPY,INSERT URETERAL STENT Right 12/23/2021    Procedure: CYSTOSCOPY, WITH URETERAL STENT EXCHANGE;  Surgeon: Jonathan Turcios M.D.;  Location: [facility];  Service: Urology    AR CYSTO/URETERO/PYELOSCOPY, DX Right 12/23/2021    Procedure: URETEROSCOPY;  Surgeon: Jonathan Turcios M.D.;  Location: [facility];  Service: Urology    AR CYSTO/URETERO/PYELOSCOPY, DX Right 12/8/2021    Procedure: URETEROSCOPY;  Surgeon: Luc Hook M.D.;  Location: [facility];  Service: Urology    CYSTOSCOPY WITH URETERAL STENT INSERTION OR REMOVAL Right 12/8/2021    Procedure: CYSTOSCOPY, WITH URETERAL STENT INSERTION;  Surgeon: Luc Hook M.D.;  Location: [facility];  Service: Urology    ILEOSTOMY  1/20/2021    Procedure: ILEOSTOMY- COMPLEX REVISION,;  Surgeon: Kevin Cruz M.D.;  Location: [facility];  Service: General    LYSIS ADHESIONS GENERAL  1/20/2021    Procedure: LYSIS, ADHESIONS;  Surgeon: Kevin Cruz M.D.;  Location: [facility];  Service: General    GASTROSCOPY N/A 5/22/2019    Procedure: GASTROSCOPY - WITH DILATION;  Surgeon: Dionicio Cardona M.D.;  Location: [facility];  Service: Gastroenterology    GASTROSCOPY  1/6/2019    Procedure: GASTROSCOPY- WITH DILATION;  Surgeon: Daniel Daniels M.D.;  Location: [facility];  Service: Gastroenterology    ILEOSTOMY  12/29/2018    Procedure: ILEOSTOMY- REVISION;  Surgeon: Kevin Cruz M.D.;  Location: [facility];  Service: General    SIGMOIDOSCOPY FLEX  12/29/2018    Procedure: SIGMOIDOSCOPY FLEX;  Surgeon: Kevin Cruz M.D.;   Location: SURGERY [facility];  Service: General    EXPLORATORY LAPAROTOMY  12/29/2018    Procedure: EXPLORATORY LAPAROTOMY, lysis of adhesions;  Surgeon: Kevin Cruz M.D.;  Location: SURGERY [facility];  Service: General    ILEOSTOMY  5/14/2014    Performed by Kevin Cruz M.D. at SURGERY [facility]    MAMMOPLASTY REDUCTION  7/17/2013    Performed by Janey Burt M.D. at SURGERY [facility]    HARDWARE REMOVAL NEURO  7/16/2012    Performed by KEELEY KIM at SURGERY [facility]    GASTROSCOPY  10/4/2011    Performed by TYSON MARTINEZ at SURGERY [facility]    COLONOSCOPY  10/4/2011    Performed by TYSON MARTINEZ at SURGERY SAME DAY [facility] ORS    DILATION AND CURETTAGE  9/24/2010    Performed by GAURAV ALY at SUR[facility]ERY [facility]    ILEOSTOMY  11/11/2009    Performed by KEVIN CRUZ at SURGERY [facility]    GASTROSCOPY WITH BIOPSY  11/22/08    Performed by NEGRITA HUYNH at SURGERY [facility]    ABDOMINAL EXPLORATION      CERVICAL DISK AND FUSION ANTERIOR      COLON RESECTION      FOOT SURGERY      HAND SURGERY      LUMBAR FUSION ANTERIOR      LUMPECTOMY      OTHER ABDOMINAL SURGERY      surgery for chrons disease    OR BREAST REDUCTION         SOCIAL HISTORY:  Social History     Socioeconomic History    Marital status:      Spouse name: Not on file    Number of children: Not on file    Years of education: Not on file    Highest education level: Associate degree: academic program   Occupational History    Not on file   Tobacco Use    Smoking status: Never    Smokeless tobacco: Never    Tobacco comments:     second hand smoke parents - smoked for only 1 year many years ago   Vaping Use    Vaping status: Some Days    Substances: THC   Substance and Sexual Activity    Alcohol use: Not Currently     Alcohol/week: 0.6 oz     Types: 1 Shots of liquor per week     Comment: gin and half a lime; tonic water    Drug use: Not  Currently     Types: Marijuana, Inhaled     Comment: medical marijuana through bong/vape    Sexual activity: Not on file     Comment:    Other Topics Concern    Not on file   Social History Narrative    Not on file     Social Drivers of Health     Financial Resource Strain: Unknown (1/15/2023)    Overall Financial Resource Strain (CARDIA)     Difficulty of Paying Living Expenses: Patient declined   Food Insecurity: No Food Insecurity (12/4/2024)    Hunger Vital Sign     Worried About Running Out of Food in the Last Year: Never true     Ran Out of Food in the Last Year: Never true   Transportation Needs: No Transportation Needs (12/4/2024)    PRAPARE - Transportation     Lack of Transportation (Medical): No     Lack of Transportation (Non-Medical): No   Physical Activity: Sufficiently Active (1/15/2023)    Exercise Vital Sign     Days of Exercise per Week: 7 days     Minutes of Exercise per Session: 60 min   Stress: No Stress Concern Present (11/4/2021)    [employer] [place] [employer] - Occupational Stress Questionnaire     Feeling of Stress : Not at all   Social Connections: Socially Integrated (1/15/2023)    Social Connection and Isolation Panel [NHANES]     Frequency of Communication with Friends and Family: Three times a week     Frequency of Social Gatherings with Friends and Family: Once a week     Attends Anabaptist Services: More than 4 times per year     Active Member of Clubs or Organizations: Yes     Attends Club or Organization Meetings: More than 4 times per year     Marital Status:    Intimate Partner Violence: Not At Risk (12/4/2024)    Humiliation, Afraid, Rape, and Kick questionnaire     Fear of Current or Ex-Partner: No     Emotionally Abused: No     Physically Abused: No     Sexually Abused: No   Housing Stability: Low Risk  (12/4/2024)    Housing Stability Vital Sign     Unable to Pay for Housing in the Last Year: No     Number of Times Moved in the Last Year: 0      Homeless in the Last Year: No       FAMILY HISTORY:  Family History   Problem Relation Age of Onset    Heart Disease Mother         Angina, MI later in life    Lung Disease Mother         copd    Diabetes Father     Heart Disease Father     Diabetes Sister     Cancer Maternal Aunt 40        breast       MEDICATIONS:  Current Outpatient Medications   Medication Sig    furosemide (LASIX) 20 MG Tab Take 1 Tablet by mouth see administration instructions. Once daily as needed for edema    potassium chloride SA (KDUR) 20 MEQ Tab CR TAKE 1 TABLET BY MOUTH TWICE DAILY    oxyCODONE immediate release (ROXICODONE) 10 MG immediate release tablet Take 1 Tablet by mouth every 6 hours as needed for Moderate Pain (Refill #3 of #3) for up to 30 days.    zolpidem (AMBIEN) 10 MG Tab Take 1 Tablet by mouth at bedtime as needed for Sleep for up to 90 days.    ondansetron (ZOFRAN ODT) 4 MG TABLET DISPERSIBLE Take 1 Tablet by mouth every 6 hours as needed for Nausea/Vomiting.    naratriptan (AMERGE) 2.5 MG tablet Take 1 Tablet by mouth as needed for Migraine.    prednisONE (DELTASONE) 2.5 MG Tab Take 1 Tablet by mouth 2 times a day.    spironolactone (ALDACTONE) 25 MG Tab Take 1 Tablet by mouth every day.    nystatin (MYCOSTATIN) 672036 UNIT/ML Suspension Take 5 mL by mouth 4 times a day.    Ascorbic Acid (VITAMIN C GUMMIE PO) Take 2 Tablets by mouth every day.    Biotin-Vitamin C (HAIR SKIN NAILS GUMMIES PO) Take 2 Tablets by mouth every day.    Ca Phosphate-Cholecalciferol (CALCIUM/VITAMIN D3 GUMMIES PO) Take 2 Tablets by mouth every day.    POTASSIUM CITRATE PO Take 2 Tablets by mouth every day. Gummy    Multiple Vitamins-Minerals (MULTI-VITAMIN GUMMIES PO) Take 2 Tablets by mouth every day.    Probiotic Product (PROBIOTIC GUMMIES PO) Take 2 Tablets by mouth every day.    prochlorperazine (COMPAZINE) 10 MG Tab Take 1 Tablet by mouth every 6 hours as needed for Nausea/Vomiting.    Azelaic Acid 15 % Gel Apply 1 Application topically  "every day. Apply's on face    hydroxychloroquine (PLAQUENIL) 200 MG Tab Take 1 Tablet by mouth every day.    metoprolol tartrate (LOPRESSOR) 50 MG Tab Take 1 Tablet by mouth 2 times a day.    levalbuterol (XOPENEX HFA) 45 MCG/ACT inhaler Inhale 2 Puffs every four hours as needed for Shortness of Breath (As needed for shortness of breath, cough, wheezing.).       ALLERGIES:   Allergies   Allergen Reactions    Demerol Hcl [Meperidine] Shortness of Breath and Palpitations    Methotrexate Hives, Shortness of Breath and Rash          Morphine Shortness of Breath and Palpitations    Promethazine Shortness of Breath and Rash          Remicade [Infliximab] Hives, Shortness of Breath and Rash          Sudafed [Pseudoephedrine] Shortness of Breath, Vomiting and Palpitations    Azathioprine Sodium Hives and Shortness of Breath     10/21/2022 - patient unable to verify allergy/reaction  Historical reaction listed: Hives, Shortness of Breath    Carafate [Sucralfate] Vomiting and Nausea     + Mouth Sores    Other Drug Unspecified     \"STEROIDS\" - REACTION NOT SPECIFIED    Sulfa Drugs Rash          Sulfasalazine Rash          Amoxicillin Anaphylaxis    Doxycycline Rash     Throat also closes up     Gabapentin Cough, Hives, Itching, Nausea, Palpitations, Photosensitivity, Rash, Runny Nose, Swelling, Unspecified and Vomiting    Nitroglycerin      Headache    Prednisone      Other Reaction(s): Reaction:GI system trouble    Amitriptyline Unspecified     Nightmares      Ativan [Lorazepam] Unspecified     Nightmares    Betamethasone Unspecified     10/21/2022 - patient unable to verify allergy/reaction  No historical reaction listed  Patient is able to tollerate prednesone 12/31/2024    Fentanyl Unspecified     \"Patches didn't work\" and skin broke down    Lyrica [Pregabalin] Nausea          Methadone Unspecified     \"Didn't work\"    Potassium Unspecified     Notes: In IV only  REACTION NOT SPECIFIED    Tizanidine Unspecified     " 10/21/2022 - patient unable to verify allergy/reaction  No historical reaction listed       IMMUNIZATIONS:  Immunization History   Administered Date(s) Administered    Covid-19 Mrna (Spikevax) Moderna 12+ Years 10/30/2023, 10/01/2024    INFLUENZA TIV (IM) 09/17/2007, 10/06/2012, 11/16/2013, 09/27/2015    Influenza Seasonal Injectable - Historical Data 10/06/2012, 11/16/2013    Influenza Vac Subunit Quad Inj (Pf) 10/10/2017    Influenza Vaccine Adult HD 11/12/2018, 09/23/2020, 10/11/2021, 10/07/2022, 10/05/2023    Influenza Vaccine Quad Inj (Pf) 10/24/2014, 10/04/2017    Influenza Vaccine Quad Inj (Preserved) 09/21/2016, 10/11/2019    Influenza Vaccine-Flucelvax - HISTORICAL DATA 01/06/2014    Influenza high-dose trivalent (PF) 10/11/2024    Influenza split virus trivalent (PF) 12/17/2010, 09/27/2015    PFIZER BIVALENT SARS-COV-2 VACCINE (12+) 10/07/2022    PFIZER DELATORRE CAP SARS-COV-2 VACCINATION (12+) 05/12/2022    PFIZER PURPLE CAP SARS-COV-2 VACCINATION (12+) 03/17/2021, 04/08/2021, 10/29/2021    Pneumococcal Conjugate Vaccine (Prevnar/PCV-13) 11/12/2018    Pneumococcal Conjugate, unspecified formulation 10/17/2007    Pneumococcal polysaccharide vaccine (PPSV-23) 10/06/2012    Tdap Vaccine 12/04/2012, 11/16/2013    ZZZInfluenza Vaccine Pediatric Preserv Free 09/29/2009    Zoster Vaccine Live (ZVL) (Zostavax) - HISTORICAL DATA 11/01/2014            Objective     Vital Signs: /64   Pulse 85   Temp 37 °C (98.6 °F)   Ht 1.829 m (6')   Wt 59.4 kg (131 lb)   SpO2 98% Body mass index is 17.77 kg/m².    General: Appears well and comfortable  Eyes: No scleral or conjunctival lesions  ENT: No apparent oral, nasal, or ear lesions  Head/Neck: No apparent scalp or neck lesions  Cardiovascular: Regular rate and rhythm  Respiratory: Breathing quiet and unlabored  Gastrointestinal: No apparent organomegaly or abdominal masses  Integumentary: Trace malar erythema on the face and variable degrees of isolated macular  erythema on upper and lower extremities  Musculoskeletal: Poorly localized mild to moderate tenderness of hands, wrists, elbows, shoulders, knees, ankles, feet, and various muscle groups of upper/lower extremities, neck/upper back, mid/lower back, and chest wall; overall range of motion relatively limited by pain; no definite joint swelling, warmth, erythema, or overt synovitis  Neurologic: No focal sensory or motor deficits  Psychiatric: Mood and affect appropriate      LABORATORY RESULTS REVIEWED AND INTERPRETED:  Lab Results   Component Value Date/Time    CREACTPROT 0.66 01/22/2025 02:15 PM    SEDRATEWES 34 (H) 01/23/2025 10:43 AM    URICACID 7.3 12/06/2022 04:51 PM     Lab Results   Component Value Date/Time    P0BLULDVFLL 157.7 11/17/2023 10:15 AM    D3DOQUCDNMH 48.0 11/17/2023 10:15 AM     Lab Results   Component Value Date/Time    RHEUMFACTN 10 10/24/2023 04:42 PM    CCPANTIBODY 8 07/28/2022 04:31 PM    IPMA61ZYOJ Negative 07/28/2022 04:31 PM     Lab Results   Component Value Date/Time    ANTINUCAB Detected (A) 10/24/2023 04:42 PM    ANADIRECT Negative 10/20/2010 11:00 AM    ANAPATTERN <1:40 01/07/2009 06:15 PM     Lab Results   Component Value Date/Time    ANTIDNADS SEE BELOW 10/24/2023 04:42 PM    SMITHAB 1 10/24/2023 04:42 PM    RNPAB 4 10/24/2023 04:42 PM    RWCMLMZ21 1 10/24/2023 04:42 PM    SSA60 1 10/24/2023 04:42 PM    SSA52 0 10/24/2023 04:42 PM    ANTISSBSJ 0 10/24/2023 04:42 PM    JO1AB 1 10/24/2023 04:42 PM     Lab Results   Component Value Date/Time    IGACARDIOLI 24 (H) 10/26/2021 07:49 AM    IGMCARDIOLI <10 10/26/2021 07:49 AM    IGGCARDIOLI 19 (H) 10/26/2021 07:49 AM     Lab Results   Component Value Date/Time     (H) 11/01/2021 07:53 AM    IGG 1169 11/01/2021 07:53 AM     Lab Results   Component Value Date/Time    ANTIMITOCHO 3.5 07/22/2016 12:41 PM    FACTIN 11 07/22/2016 12:41 PM     Lab Results   Component Value Date/Time    MICROSOMALA <9.0 07/28/2022 04:31 PM    ANTITHYROGL <0.9  07/28/2022 04:31 PM    TSH 0.922 10/20/2010 11:00 AM    TSHULTRASEN 0.360 (L) 10/17/2023 08:00 AM    FREEDIR 1.28 10/20/2010 11:00 AM    FREET4 1.54 10/17/2023 03:12 PM     Lab Results   Component Value Date/Time    CPKTOTAL 41 05/05/2022 09:43 AM    ALDOLASE 4.2 01/21/2008 03:43 AM    MYOGLOBIN 73 07/17/2008 03:48 PM     Lab Results   Component Value Date/Time    25HYDROXY 40 07/14/2022 11:23 AM    PTHINTACT 52.3 12/06/2022 04:51 PM     Lab Results   Component Value Date/Time    FERRITIN 271.0 12/07/2024 09:14 AM    IRON 27 (L) 12/07/2024 09:14 AM    FOLATE 16.4 04/28/2021 07:59 AM    PROTHROMBTM 13.0 12/30/2024 03:56 AM    INR 0.95 12/30/2024 03:56 AM     Lab Results   Component Value Date/Time    WBC 7.3 01/23/2025 10:43 AM    WBC 7.4 01/23/2025 10:43 AM    WBC 7.9 02/10/2010 04:04 PM    RBC 4.28 01/23/2025 10:43 AM    RBC 4.21 01/23/2025 10:43 AM    RBC 4.86 02/10/2010 04:04 PM    HEMOGLOBIN 13.0 01/23/2025 10:43 AM    HEMOGLOBIN 13.0 01/23/2025 10:43 AM    HEMATOCRIT 40.6 01/23/2025 10:43 AM    HEMATOCRIT 40.0 01/23/2025 10:43 AM    MCV 94.9 01/23/2025 10:43 AM    MCV 95.0 01/23/2025 10:43 AM    MCV 86 02/10/2010 04:04 PM    MCH 30.4 01/23/2025 10:43 AM    MCH 30.9 01/23/2025 10:43 AM    MCH 27.8 02/10/2010 04:04 PM    MCHC 32.0 (L) 01/23/2025 10:43 AM    MCHC 32.5 01/23/2025 10:43 AM    RDW 44.9 01/23/2025 10:43 AM    RDW 44.7 01/23/2025 10:43 AM    RDW 14.6 02/10/2010 04:04 PM    PLATELETCT 192 01/23/2025 10:43 AM    PLATELETCT 184 01/23/2025 10:43 AM    MPV 10.0 01/23/2025 10:43 AM    MPV 10.2 01/23/2025 10:43 AM    NEUTS 5.33 01/23/2025 10:43 AM    NEUTS 5.38 01/23/2025 10:43 AM    NEUTS 4.8 02/10/2010 04:04 PM    POLYS 53 01/28/2021 02:40 PM    LYMPHOCYTES 13.50 (L) 01/23/2025 10:43 AM    LYMPHOCYTES 13.70 (L) 01/23/2025 10:43 AM    MONOCYTES 11.00 01/23/2025 10:43 AM    MONOCYTES 11.60 01/23/2025 10:43 AM    EOSINOPHILS 1.40 01/23/2025 10:43 AM    EOSINOPHILS 1.50 01/23/2025 10:43 AM    EOSINOPHILS 34  01/28/2021 02:40 PM    BASOPHILS 0.60 01/23/2025 10:43 AM    BASOPHILS 0.50 01/23/2025 10:43 AM    HYPOCHROMIA 1+ 03/15/2014 10:02 AM     Lab Results   Component Value Date/Time    ASTSGOT 34 01/23/2025 10:43 AM    ALTSGPT 25 01/23/2025 10:43 AM    ALKPHOSPHAT 104 (H) 01/23/2025 10:43 AM    TBILIRUBIN 0.5 01/23/2025 10:43 AM    TOTPROTEIN 7.5 01/23/2025 10:43 AM    TOTPROTEIN 7.1 11/01/2021 07:53 AM    ALBUMIN 4.1 01/23/2025 10:43 AM    ALBUMIN 3.83 11/01/2021 07:53 AM     Lab Results   Component Value Date/Time    SODIUM 136 01/23/2025 10:43 AM    POTASSIUM 4.3 01/23/2025 10:43 AM    CHLORIDE 101 01/23/2025 10:43 AM    CO2 23 01/23/2025 10:43 AM    GLUCOSE 89 01/23/2025 10:43 AM    BUN 16 01/23/2025 10:43 AM    CREATININE 0.99 01/23/2025 10:43 AM    CREATININE 0.89 02/10/2010 04:04 PM    BUNCREATRAT 17 02/10/2010 04:04 PM    GLOMRATE >59 02/10/2010 04:04 PM    CALCIUM 10.0 01/23/2025 10:43 AM    MAGNESIUM 2.0 12/09/2024 03:14 AM     Lab Results   Component Value Date/Time    TOTALVOLUME 16 08/19/2023 08:00 AM    TOTPROTUR 6 10/14/2021 05:00 AM    QKFMAPGC60 30 (L) 10/14/2021 05:00 AM    CREATININEU 1022 08/19/2023 08:00 AM     Lab Results   Component Value Date/Time    COLORURINE Yellow 12/15/2024 12:52 PM    SPECGRAVITY 1.025 12/15/2024 12:52 PM    PHURINE 7.0 12/15/2024 12:52 PM    GLUCOSEUR Negative 12/15/2024 12:52 PM    KETONES Negative 12/15/2024 12:52 PM    PROTEINURIN 30 (A) 12/15/2024 12:52 PM     Lab Results   Component Value Date/Time    FLTYPE Peritoneal 01/28/2021 02:40 PM     Lab Results   Component Value Date/Time    HEPBSAG Negative 07/22/2016 12:41 PM    HEPBCORIGM Negative 07/22/2016 12:41 PM    HEPCAB Negative 07/22/2016 12:41 PM     Lab Results   Component Value Date/Time    CHOLSTRLTOT 194 05/23/2022 02:36 AM    LDL 90 05/23/2022 02:36 AM    HDL 81 05/23/2022 02:36 AM    TRIGLYCERIDE 113 05/23/2022 02:36 AM    HBA1C 5.3 11/17/2023 10:34 AM       RADIOLOGY RESULTS REVIEWED AND  INTERPRETED:    Results for orders placed in visit on 07/27/22    DX-JOINT SURVEY-HANDS SINGLE VIEW    Impression  1. No radiographic evidence of inflammatory arthropathy.    Results for orders placed during the hospital encounter of 12/30/24    DX-SHOULDER 2+ LEFT    Impression  No acute abnormality.      All relevant laboratory and imaging results reported on this note were reviewed and interpreted by me.         Assessment & Plan     Jana Overton is a 69 y.o. female with history and physical as noted above whose presentation merits the following clinical impressions and recommendations:    1. Drug-induced lupus erythematosus  Clinically and serologically stable without significant evidence of disease activity on the current regimen of hydroxychloroquine, so no need for escalation of treatment. However, given the potential for smoldering disease activity with limited clinical manifestations, need to occasionally reassess markers of disease activity and risk stratification to gauge overall trajectory.  - CRP QUANTITIVE (NON-CARDIAC); Future  - Sed Rate; Future  - CBC WITH DIFFERENTIAL; Future  - Comp Metabolic Panel; Future  - Continue hydroxychloroquine 200 mg daily    2. Fibromyalgia syndrome  Most significant etiology of her overall musculoskeletal pain burden which can be managed supportively.  - Consider trial of Cymbalta as Neurontin and Lyrica were previously ineffective  - Muscle relaxant therapy options include Soma, Flexeril, Zanaflex, or Robaxin    3. Primary osteoarthritis of both hands  Significant etiology of biomechanical arthralgia which can be managed supportively.  - Extra Strength Tylenol and Voltaren 1% gel with or without oral NSAIDs as needed  - Consider intra-articular steroid injection if that becomes necessary    4. Degenerative joint disease of cervical and lumbar spine  Significant etiology of biomechanical spinal arthralgia which can be managed supportively.  - Lidocaine 4% Patch,  Extra Strength Tylenol and oral NSAIDs as needed  - Consider referral to pain management if that becomes necessary    5. Crohn's disease of small intestine with complication (HCC)  Status post ileostomy with stoma status post revision and appears to be stable despite being off of immunosuppressive therapy as it is thought that her past use of Remicade and/or hydralazine likely precipitated her drug-induced lupus.  - Routine follow-up with gastroenterology    6. Long-term use of hydroxychloroquine  Risk of retinal toxicity considered minimal to none given the optimal dose of hydroxychloroquine prescribed (less than 5 mg/kg of body weight) per rheumatology and ophthalmology guidelines. However, ophthalmologic evaluation is still recommended with the frequency of routine follow-up eye exams determined by the ophthalmologist, typically annually or every other year.  - Routine ophthalmology evaluation as recommended      The above assessment and plan were discussed with the patient who acknowledged understanding of the plan.    FOLLOW-UP: Return in about 6 months (around 9/19/2025) for Short.         Thank you for the opportunity to participate in the care of Jana Overton.    Petar Lewis MD, MS, FACR  Rheumatologist, [facility], [facility]   of Clinical Medicine, Department of Internal Medicine  [facility]

## 2025-03-19 NOTE — PATIENT INSTRUCTIONS
[facility]    Below are important guidelines to help you navigate your health care needs and assist us in caring for you safely and effectively. We encourage you to carefully read and understand this information and adhere to them accordingly.    Moxie Messaging and Phone Calls:  Diagnosis and Treatment - For a detailed explanation of your condition and treatment plan from today's visit, refer to the visit note on Moxie via the following steps:  Log in to Moxie and click on “Visits” at the top.  Scroll down to “Past Visits” under Appointments.  Click on “View Notes” under the appropriate visit date.  Questions or Concerns - MyChart messaging is for non-urgent matters that do not require immediate attention and should be brief with no more than two questions or concerns. If you have multiple questions or concerns, we ask that you schedule an appointment to have them properly addressed.  Response to Messages - Moxie messages are addressed throughout the week depending on clinical availability, so we ask that you allow up to one week for a response.  Phone Calls and Voicemails - Phone calls and voicemail messages are reserved for time-sensitive matters that cannot wait to be addressed via Moxie. We ask that you refrain from calling the office multiple times or leaving multiple voicemails regarding the same issue as doing so may lead to delays in response time.  Urgent Issues - For urgent medical matters or medical emergencies that cannot wait, you are advised to go to your nearest Urgent Care or Emergency Department for immediate attention.    Laboratory Tests and Imaging Studies:  Future Lab and Imaging Orders - We ask that you get your lab tests and imaging studies done no later than one week before your follow-up visit unless instructed otherwise.  Results Communication - You may see some test results marked as “abnormal” that are not necessarily significant or concerning. If  there are significant abnormalities on your test results that warrant further action, you will be notified via MyChart or phone call, otherwise they will be addressed at your follow-up visit.    Prescriptions and Refill Requests:  General Prescriptions (e.g. prednisone, hydroxychloroquine, leflunomide, methotrexate, etc.) - These are sent to Retail Pharmacies, so all refill requests of these medications should be directed to your local pharmacy.  Specialty Prescriptions (e.g. Enbrel, Humira, Cosentyx, Xeljanz, etc.) - These are sent to Specialty Pharmacies, so all refill requests of these medications should be directed to your designated specialty pharmacy.  Infusion Prescriptions (e.g. Remicade, Simponi Aria, Rituxan, Saphnelo, etc.) - These are sent to Outpatient Infusion Centers, so all scheduling requests of these medications should be directed to your local infusion center.    Medication Risks and Adverse Effects:  Immunosuppressed Status - Steroids and antirheumatic drugs are immunosuppressants, so they increase the risk of infections and can have side effects on various organ systems in your body, though most of them are uncommon.  Potential Side Effects - Be sure to read the drug package inserts to learn about the potential side effects of your medications before you start taking them and take them exactly as prescribed unless instructed otherwise.  In Case of Side Effects - If you experience any significant side effects, stop taking the medication immediately and promptly notify the prescriber. Depending on the severity of the side effects, consider going to an Urgent Care or Emergency Department for immediate attention.    Immunizations and Health Screening:  Vaccinations - If you are on immunosuppressive therapy, it is important that you are up to date on age-appropriate immunizations, particularly shingles and pneumonia vaccines, which you can request from your primary care provider or from us at your  next appointment.  Screening Tests - It is also important that you are up to date on age-appropriate screening tests, such as pap smear, mammography, and colonoscopy, which you can request from your primary care provider.    Educational and Supportive Resources:  [facility] ([email or web address]) - Visit our website to learn more about your condition and other rheumatic diseases, and gain access to many helpful resources for them.  Disposal of Old Medications (www.[place].gov/everyday-takeback-day) - Visit the Drug Enforcement Administration website to find a nearby location where you can properly dispose of old medications you no longer need.  Disposal of Used [place] (www.safeneedledisposal.org) - Visit the Safe Needle Disposal Organization website to find a nearby location where you can properly dispose of used needles from your injectable medications.

## 2025-03-25 ENCOUNTER — APPOINTMENT (OUTPATIENT)
Dept: URBAN - METROPOLITAN AREA CLINIC 20 | Facility: CLINIC | Age: 70
Setting detail: DERMATOLOGY
End: 2025-03-25

## 2025-03-25 DIAGNOSIS — D18.0 HEMANGIOMA: ICD-10-CM

## 2025-03-25 DIAGNOSIS — Z85.828 PERSONAL HISTORY OF OTHER MALIGNANT NEOPLASM OF SKIN: ICD-10-CM

## 2025-03-25 DIAGNOSIS — L71.8 OTHER ROSACEA: ICD-10-CM

## 2025-03-25 DIAGNOSIS — L81.4 OTHER MELANIN HYPERPIGMENTATION: ICD-10-CM

## 2025-03-25 DIAGNOSIS — Z71.89 OTHER SPECIFIED COUNSELING: ICD-10-CM

## 2025-03-25 DIAGNOSIS — D485 NEOPLASM OF UNCERTAIN BEHAVIOR OF SKIN: ICD-10-CM

## 2025-03-25 DIAGNOSIS — L82.1 OTHER SEBORRHEIC KERATOSIS: ICD-10-CM

## 2025-03-25 DIAGNOSIS — L72.8 OTHER FOLLICULAR CYSTS OF THE SKIN AND SUBCUTANEOUS TISSUE: ICD-10-CM

## 2025-03-25 DIAGNOSIS — D22 MELANOCYTIC NEVI: ICD-10-CM

## 2025-03-25 PROBLEM — D22.5 MELANOCYTIC NEVI OF TRUNK: Status: ACTIVE | Noted: 2025-03-25

## 2025-03-25 PROBLEM — D22.39 MELANOCYTIC NEVI OF OTHER PARTS OF FACE: Status: ACTIVE | Noted: 2025-03-25

## 2025-03-25 PROBLEM — D22.62 MELANOCYTIC NEVI OF LEFT UPPER LIMB, INCLUDING SHOULDER: Status: ACTIVE | Noted: 2025-03-25

## 2025-03-25 PROBLEM — D22.61 MELANOCYTIC NEVI OF RIGHT UPPER LIMB, INCLUDING SHOULDER: Status: ACTIVE | Noted: 2025-03-25

## 2025-03-25 PROBLEM — D22.71 MELANOCYTIC NEVI OF RIGHT LOWER LIMB, INCLUDING HIP: Status: ACTIVE | Noted: 2025-03-25

## 2025-03-25 PROBLEM — D22.72 MELANOCYTIC NEVI OF LEFT LOWER LIMB, INCLUDING HIP: Status: ACTIVE | Noted: 2025-03-25

## 2025-03-25 PROBLEM — D48.5 NEOPLASM OF UNCERTAIN BEHAVIOR OF SKIN: Status: ACTIVE | Noted: 2025-03-25

## 2025-03-25 PROBLEM — D18.01 HEMANGIOMA OF SKIN AND SUBCUTANEOUS TISSUE: Status: ACTIVE | Noted: 2025-03-25

## 2025-03-25 PROCEDURE — ? COUNSELING

## 2025-03-25 PROCEDURE — 11900 INJECT SKIN LESIONS </W 7: CPT

## 2025-03-25 PROCEDURE — ? SUNSCREEN RECOMMENDATIONS

## 2025-03-25 PROCEDURE — ? BIOPSY BY SHAVE METHOD

## 2025-03-25 PROCEDURE — 11102 TANGNTL BX SKIN SINGLE LES: CPT

## 2025-03-25 PROCEDURE — ? ADDITIONAL NOTES

## 2025-03-25 PROCEDURE — ? INTRALESIONAL KENALOG

## 2025-03-25 PROCEDURE — 99213 OFFICE O/P EST LOW 20 MIN: CPT | Mod: 25

## 2025-03-25 PROCEDURE — ? MEDICATION COUNSELING

## 2025-03-25 ASSESSMENT — LOCATION ZONE DERM
LOCATION ZONE: FACE
LOCATION ZONE: HAND
LOCATION ZONE: ANUS
LOCATION ZONE: NOSE
LOCATION ZONE: TRUNK
LOCATION ZONE: LEG
LOCATION ZONE: ARM

## 2025-03-25 ASSESSMENT — LOCATION DETAILED DESCRIPTION DERM
LOCATION DETAILED: RIGHT DISTAL POSTERIOR UPPER ARM
LOCATION DETAILED: LEFT VENTRAL PROXIMAL FOREARM
LOCATION DETAILED: RIGHT VENTRAL PROXIMAL FOREARM
LOCATION DETAILED: RIGHT INFERIOR MEDIAL UPPER BACK
LOCATION DETAILED: LEFT BUTTOCK
LOCATION DETAILED: LEFT LATERAL PROXIMAL PRETIBIAL REGION
LOCATION DETAILED: LEFT PROXIMAL POSTERIOR UPPER ARM
LOCATION DETAILED: RIGHT INFERIOR MEDIAL MIDBACK
LOCATION DETAILED: RIGHT ULNAR DORSAL HAND
LOCATION DETAILED: RIGHT INFERIOR FOREHEAD
LOCATION DETAILED: PERIANAL SKIN
LOCATION DETAILED: RIGHT SUPERIOR UPPER BACK
LOCATION DETAILED: INFERIOR THORACIC SPINE
LOCATION DETAILED: RIGHT PROXIMAL PRETIBIAL REGION
LOCATION DETAILED: LEFT DISTAL POSTERIOR THIGH
LOCATION DETAILED: LEFT NASAL ALA
LOCATION DETAILED: RIGHT CENTRAL MALAR CHEEK
LOCATION DETAILED: RIGHT DISTAL POSTERIOR THIGH
LOCATION DETAILED: RIGHT INFERIOR CENTRAL MALAR CHEEK
LOCATION DETAILED: LEFT INFERIOR MEDIAL MALAR CHEEK
LOCATION DETAILED: LEFT SUPERIOR MEDIAL MALAR CHEEK

## 2025-03-25 ASSESSMENT — LOCATION SIMPLE DESCRIPTION DERM
LOCATION SIMPLE: RIGHT PRETIBIAL REGION
LOCATION SIMPLE: PERIANAL SKIN
LOCATION SIMPLE: RIGHT LOWER BACK
LOCATION SIMPLE: RIGHT HAND
LOCATION SIMPLE: LEFT POSTERIOR THIGH
LOCATION SIMPLE: RIGHT FOREHEAD
LOCATION SIMPLE: LEFT POSTERIOR UPPER ARM
LOCATION SIMPLE: RIGHT UPPER BACK
LOCATION SIMPLE: LEFT CHEEK
LOCATION SIMPLE: LEFT NOSE
LOCATION SIMPLE: RIGHT POSTERIOR UPPER ARM
LOCATION SIMPLE: UPPER BACK
LOCATION SIMPLE: LEFT BUTTOCK
LOCATION SIMPLE: RIGHT FOREARM
LOCATION SIMPLE: RIGHT POSTERIOR THIGH
LOCATION SIMPLE: RIGHT CHEEK
LOCATION SIMPLE: LEFT PRETIBIAL REGION
LOCATION SIMPLE: LEFT FOREARM

## 2025-03-25 NOTE — PROCEDURE: INTRALESIONAL KENALOG
Include Z78.9 (Other Specified Conditions Influencing Health Status) As An Associated Diagnosis?: Yes
Kenalog Preparation: Kenalog
Lot # For Kenalog (Optional): Lot Number: [id number] Expiration Date: JUN 2027 Administered by: Siena STARK
Total Volume (Ccs): 1
Detail Level: Detailed
Treatment Number (Optional): 2
Medical Necessity Clause: This procedure was medically necessary because the lesions that were treated were:
Concentration Of Kenalog Solution Injected (Mg/Ml): 5.0
Consent: The risks of atrophy, bleeding, scarring and infection were reviewed with the patient. Re-treatment may be necessary.
X Size Of Lesion In Cm (Optional): 0
Bill For Wasted Drug (Kenalog)?: no
Kenalog Type Of Vial: Multiple Dose
Which Kenalog Vial Was Used?: Kenalog 10 mg/ml (5 ml vial)

## 2025-03-26 ENCOUNTER — APPOINTMENT (OUTPATIENT)
Dept: URBAN - METROPOLITAN AREA CLINIC 36 | Facility: CLINIC | Age: 70
Setting detail: DERMATOLOGY
End: 2025-03-26

## 2025-03-26 DIAGNOSIS — Z48.817 ENCOUNTER FOR SURGICAL AFTERCARE FOLLOWING SURGERY ON THE SKIN AND SUBCUTANEOUS TISSUE: ICD-10-CM

## 2025-03-26 DIAGNOSIS — Z41.9 ENCOUNTER FOR PROCEDURE FOR PURPOSES OTHER THAN REMEDYING HEALTH STATE, UNSPECIFIED: ICD-10-CM

## 2025-03-26 PROCEDURE — ? CO2RE

## 2025-03-26 PROCEDURE — ? POST-OP WOUND CHECK

## 2025-03-26 ASSESSMENT — LOCATION DETAILED DESCRIPTION DERM: LOCATION DETAILED: LEFT NASAL ALA

## 2025-03-26 ASSESSMENT — LOCATION ZONE DERM: LOCATION ZONE: NOSE

## 2025-03-26 ASSESSMENT — LOCATION SIMPLE DESCRIPTION DERM: LOCATION SIMPLE: LEFT NOSE

## 2025-03-26 NOTE — PROCEDURE: POST-OP WOUND CHECK
Detail Level: Detailed
Add 75782 Cpt? (Important Note: In 2017 The Use Of 68206 Is Being Tracked By Cms To Determine Future Global Period Reimbursement For Global Periods): no

## 2025-03-26 NOTE — PROCEDURE: CO2RE
Add Another Laser Setting?: no
Energy (Mj): 70
Other Location: left nasal ala
Laser Mode: Light
Eye Protection Text: A corneal shield was inserted after appropriate application of topical anesthesia.
Detail Level: Simple
Laser Mode: Deep
Post-Care Instructions: I reviewed with the patient in detail post-care instructions. Patient should avoid sun until area fully healed. Pt should apply vaseline to treated areas, and remove crusts gently with water-vinegar soaks.
Other Location: left distal pretibial region
Consent: Written consent obtained, risks reviewed including but not limited to crusting, scabbing, blistering, scarring, darker or lighter pigmentary change, incomplete improvement of dyschromia, wrinkles, prolonged erythema and facial swelling, infection and bleeding.
Treatment Number: 1
Treatment Number: 3
Anesthesia Type: 1% lidocaine with epinephrine
External Cooling Fan Speed: 5
Price (Use Numbers Only, No Special Characters Or $): 0
Energy (Mj): 80

## 2025-04-02 ENCOUNTER — OFFICE VISIT (OUTPATIENT)
Facility: MEDICAL CENTER | Age: 70
End: 2025-04-02
Payer: MEDICARE

## 2025-04-02 VITALS
HEART RATE: 68 BPM | TEMPERATURE: 97.1 F | WEIGHT: 131 LBS | OXYGEN SATURATION: 93 % | HEIGHT: 72 IN | BODY MASS INDEX: 17.74 KG/M2

## 2025-04-02 DIAGNOSIS — K22.0 ACHALASIA: ICD-10-CM

## 2025-04-02 PROCEDURE — 99203 OFFICE O/P NEW LOW 30 MIN: CPT | Performed by: SURGERY

## 2025-04-02 ASSESSMENT — FIBROSIS 4 INDEX: FIB4 SCORE: 2.62

## 2025-04-03 DIAGNOSIS — K21.9 GASTROESOPHAGEAL REFLUX DISEASE WITHOUT ESOPHAGITIS: ICD-10-CM

## 2025-04-03 DIAGNOSIS — K50.019 CROHN'S DISEASE OF SMALL INTESTINE WITH COMPLICATION (HCC): ICD-10-CM

## 2025-04-03 DIAGNOSIS — R19.7 DIARRHEA, UNSPECIFIED TYPE: ICD-10-CM

## 2025-04-03 RX ORDER — OXYCODONE HYDROCHLORIDE 10 MG/1
10 TABLET ORAL EVERY 6 HOURS PRN
Qty: 60 TABLET | Refills: 0 | Status: SHIPPED | OUTPATIENT
Start: 2025-04-03 | End: 2025-04-03 | Stop reason: SDUPTHER

## 2025-04-03 NOTE — TELEPHONE ENCOUNTER
Received request via: Patient    Was the patient seen in the last year in this department? Yes    Does the patient have an active prescription (recently filled or refills available) for medication(s) requested? No    Pharmacy Name: [facility]    Does the patient have assisted Plus and need 100-day supply? (This applies to ALL medications) Patient does not have SCP

## 2025-04-03 NOTE — PROGRESS NOTES
DEPARTMENT OF SURGERY  BARIATRIC AND GASTROESOPHAGEAL SURGERY       DATE OF CLINIC VISIT:  4/2/2025     REASON FOR VISIT:  Ms. Jana Overton has been referred to [facility] by Chase Charles D.* for evaluation of chronic, worsening dysphagia with regurgitation, in the setting of a known diagnosis of achalasia.    HISTORY OF PRESENT ILLNESS:  Ms. Jana Overton is a pleasant 69 y.o. female, with PMH significant for Crohn's disease, s/p total colectomy and ileostomy, who presents for evaluation of chronic, worsening, symptoms of dysphagia with regurgitation, in the setting of a known diagnosis of achalasia. Her symptoms primarily relate to meals and while lying down after meals. In addition, some symptoms occur at night. Most bothersome symptom(s) include dysphagia. In addition, patient reports occasional symptoms of substernal chest pain and pressure, throat pain, and epigastric abdominal pain. Her symptoms are aggravated by eating large portions, spicy foods and being supine, and are improved with avoidance of food, changing to an upright position, and regurgitation/vomiting. Onset of symptoms began many years ago. Ms. Overton has lost weight (20+ lbs) over the last few years.    Previous relevant studies include:  EGD's, Manometry, UGI, and CT Scan of Abdomen/Pelvis . Please see below for description of pertinent findings. Currently, GERD-related medications include: Zofran and Compazine. Symptoms have improved only slightly since starting the above-mentioned anti-reflux medication regimen.    Allergies   Allergen Reactions    Demerol Hcl [Meperidine] Shortness of Breath and Palpitations    Methotrexate Hives, Shortness of Breath and Rash          Morphine Shortness of Breath and Palpitations    Promethazine Shortness of Breath and Rash          Remicade [Infliximab] Hives, Shortness of Breath and Rash          Sudafed [Pseudoephedrine]  "Shortness of Breath, Vomiting and Palpitations    Azathioprine Sodium Hives and Shortness of Breath     10/21/2022 - patient unable to verify allergy/reaction  Historical reaction listed: Hives, Shortness of Breath    Carafate [Sucralfate] Vomiting and Nausea     + Mouth Sores    Other Drug Unspecified     \"STEROIDS\" - REACTION NOT SPECIFIED    Sulfa Drugs Rash          Sulfasalazine Rash          Amoxicillin Anaphylaxis    Doxycycline Rash     Throat also closes up     Gabapentin Cough, Hives, Itching, Nausea, Palpitations, Photosensitivity, Rash, Runny Nose, Swelling, Unspecified and Vomiting    Nitroglycerin      Headache    Prednisone      Other Reaction(s): Reaction:GI system trouble    Amitriptyline Unspecified     Nightmares      Ativan [Lorazepam] Unspecified     Nightmares    Betamethasone Unspecified     10/21/2022 - patient unable to verify allergy/reaction  No historical reaction listed  Patient is able to tollerate prednesone 12/31/2024    Fentanyl Unspecified     \"Patches didn't work\" and skin broke down    Lyrica [Pregabalin] Nausea          Methadone Unspecified     \"Didn't work\"    Potassium Unspecified     Notes: In IV only  REACTION NOT SPECIFIED    Tizanidine Unspecified     10/21/2022 - patient unable to verify allergy/reaction  No historical reaction listed       Past Medical History:   Diagnosis Date    Anesthesia     Difficult IV stick    Anxiety     Arthritis     all over    ASTHMA     Atrial fib/flut     Backpain     Bronchitis     5 years    Chronic pain     Colostomy in place ([facility])     COPD (chronic obstructive pulmonary disease) ([facility])     Crohn's disease of colon ([facility])     Dyspnea     History of cardiac murmur     Ileostomy present ([facility])     Infectious disease     MRSA, VancoRSA    Multiple falls     Narcotic dependence ([facility])     Obstruction     Pain     Pneumonia     child and mid 30's    Postherpetic neuralgia     Rosacea     Sleep apnea       Past Surgical History:   Procedure " Laterality Date    [place] UPPER GI ENDOSCOPY,DIAGNOSIS N/A 12/7/2024    Procedure: EGD WITH BOTOX INJECTION;  Surgeon: Stuart Payan D.O.;  Location: SURGERY [facility];  Service: Gastroenterology    [place] UPPER GI ENDOSCOPY,DIAGNOSIS N/A 2/8/2023    Procedure: GASTROSCOPY;  Surgeon: Jamarcus Davalos M.D.;  Location: SURGERY SAME DAY [facility];  Service: Gastroenterology    [place] UPPER GI ENDOSCOPY,W/DILAT,GASTRIC OUT N/A 2/8/2023    Procedure: GASTROSCOPY, WITH BALLOON DILATION;  Surgeon: Jamarcus Davalos M.D.;  Location: SURGERY SAME DAY [facility];  Service: Gastroenterology    [place] UPPER GI ENDOSCOPY,BIOPSY N/A 2/8/2023    Procedure: GASTROSCOPY, WITH BIOPSY;  Surgeon: Jamarcus Davalos M.D.;  Location: SURGERY SAME DAY [facility];  Service: Gastroenterology    [place] UPPER GI ENDOSCOPY,DIAGNOSIS N/A 1/16/2023    Procedure: GASTROSCOPY;  Surgeon: Jamarcus Davalos M.D.;  Location: SURGERY SAME DAY [facility];  Service: Gastroenterology    [place] UPPER GI ENDOSCOPY,W/DILAT,GASTRIC OUT N/A 1/16/2023    Procedure: GASTROSCOPY, WITH BALLOON DILATION;  Surgeon: Jamarcus Davalos M.D.;  Location: SURGERY SAME DAY [facility];  Service: Gastroenterology    [place] UPPER GI ENDOSCOPY,BIOPSY N/A 1/16/2023    Procedure: GASTROSCOPY, WITH BIOPSY;  Surgeon: Jamarcus Davalos M.D.;  Location: SURGERY SAME DAY [facility];  Service: Gastroenterology    [place] UPPER GI ENDOSCOPY,DIAGNOSIS N/A 10/24/2022    Procedure: GASTROSCOPY;  Surgeon: Jamarcus Davalos M.D.;  Location: SURGERY SAME DAY [facility];  Service: Gastroenterology    BILIARY DILATATION N/A 10/24/2022    Procedure: DILATION, STRICTURE, BILE DUCT;  Surgeon: Jamarcus Davalos M.D.;  Location: SURGERY SAME DAY [facility];  Service: Gastroenterology    [place] CYSTOSCOPY,INSERT URETERAL STENT Right 12/23/2021    Procedure: CYSTOSCOPY, WITH URETERAL STENT EXCHANGE;  Surgeon: Jonathan Turcios M.D.;  Location: [facility];  Service: Urology    [place] CYSTO/URETERO/PYELOSCOPY, DX Right 12/23/2021    Procedure: URETEROSCOPY;  Surgeon:  Jonathan Turcios M.D.;  Location: [facility];  Service: Urology    [place] CYSTO/URETERO/PYELOSCOPY, DX Right 12/8/2021    Procedure: URETEROSCOPY;  Surgeon: Luc Hook M.D.;  Location: [facility];  Service: Urology    CYSTOSCOPY WITH URETERAL STENT INSERTION OR REMOVAL Right 12/8/2021    Procedure: CYSTOSCOPY, WITH URETERAL STENT INSERTION;  Surgeon: Luc Hook M.D.;  Location: [facility];  Service: Urology    ILEOSTOMY  1/20/2021    Procedure: ILEOSTOMY- COMPLEX REVISION,;  Surgeon: Kevin Cruz M.D.;  Location: [facility];  Service: General    LYSIS ADHESIONS GENERAL  1/20/2021    Procedure: LYSIS, ADHESIONS;  Surgeon: Kevin Cruz M.D.;  Location: [facility];  Service: General    GASTROSCOPY N/A 5/22/2019    Procedure: GASTROSCOPY - WITH DILATION;  Surgeon: Dionicio Cardona M.D.;  Location: [facility];  Service: Gastroenterology    GASTROSCOPY  1/6/2019    Procedure: GASTROSCOPY- WITH DILATION;  Surgeon: Daniel Daniels M.D.;  Location: [facility];  Service: Gastroenterology    ILEOSTOMY  12/29/2018    Procedure: ILEOSTOMY- REVISION;  Surgeon: Kevin Cruz M.D.;  Location: [facility];  Service: General    SIGMOIDOSCOPY FLEX  12/29/2018    Procedure: SIGMOIDOSCOPY FLEX;  Surgeon: Kevin Cruz M.D.;  Location: [facility];  Service: General    EXPLORATORY LAPAROTOMY  12/29/2018    Procedure: EXPLORATORY LAPAROTOMY, lysis of adhesions;  Surgeon: Kevin Cruz M.D.;  Location: [facility];  Service: General    ILEOSTOMY  5/14/2014    Performed by Kevin Cruz M.D. at [facility]    MAMMOPLASTY REDUCTION  7/17/2013    Performed by Janey Burt M.D. at [facility]    HARDWARE REMOVAL NEURO  7/16/2012    Performed by KEELEY KIM at [facility]    GASTROSCOPY  10/4/2011    Performed by TYSON MARTINEZ at SURGERY SAME DAY [facility] ORS     COLONOSCOPY  10/4/2011    Performed by TYSON MARTINEZ at SURGERY SAME DAY [facility] ORS    DILATION AND CURETTAGE  9/24/2010    Performed by GAURAV ALY at SURGERY SAME DAY [facility] ORS    ILEOSTOMY  11/11/2009    Performed by SYDNEE AUGUSTINE at SURGERY [facility] ORS    GASTROSCOPY WITH BIOPSY  11/22/08    Performed by NEGRITA HUYNH at SURGERY [facility] ORS    ABDOMINAL EXPLORATION      CERVICAL DISK AND FUSION ANTERIOR      COLON RESECTION      FOOT SURGERY      HAND SURGERY      LUMBAR FUSION ANTERIOR      LUMPECTOMY      OTHER ABDOMINAL SURGERY      surgery for chrons disease    NM BREAST REDUCTION       Family History   Problem Relation Age of Onset    Heart Disease Mother         Angina, MI later in life    Lung Disease Mother         copd    Diabetes Father     Heart Disease Father     Diabetes Sister     Cancer Maternal Aunt 40        breast     Social History     Socioeconomic History    Marital status:      Spouse name: Not on file    Number of children: Not on file    Years of education: Not on file    Highest education level: Associate degree: academic program   Occupational History    Not on file   Tobacco Use    Smoking status: Never    Smokeless tobacco: Never    Tobacco comments:     second hand smoke parents - smoked for only 1 year many years ago   Vaping Use    Vaping status: Some Days    Substances: THC   Substance and Sexual Activity    Alcohol use: Not Currently     Alcohol/week: 0.6 oz     Types: 1 Shots of liquor per week     Comment: gin and half a lime; tonic water    Drug use: Not Currently     Types: Marijuana, Inhaled     Comment: medical marijuana through bong/vape    Sexual activity: Not on file     Comment:    Other Topics Concern    Not on file   Social History Narrative    Not on file     Social Drivers of Health     Financial Resource Strain: Unknown (1/15/2023)    Overall Financial Resource Strain (CARDIA)     Difficulty of Paying Living Expenses:  Patient declined   Food Insecurity: No Food Insecurity (12/4/2024)    Hunger Vital Sign     Worried About Running Out of Food in the Last Year: Never true     Ran Out of Food in the Last Year: Never true   Transportation Needs: No Transportation Needs (12/4/2024)    PRAPARE - Transportation     Lack of Transportation (Medical): No     Lack of Transportation (Non-Medical): No   Physical Activity: Sufficiently Active (1/15/2023)    Exercise Vital Sign     Days of Exercise per Week: 7 days     Minutes of Exercise per Session: 60 min   Stress: No Stress Concern Present (11/4/2021)    [place] [employer] - Occupational Stress Questionnaire     Feeling of Stress : Not at all   Social Connections: Socially Integrated (1/15/2023)    Social Connection and Isolation Panel [NHANES]     Frequency of Communication with Friends and Family: Three times a week     Frequency of Social Gatherings with Friends and Family: Once a week     Attends Congregation Services: More than 4 times per year     Active Member of Clubs or Organizations: Yes     Attends Club or Organization Meetings: More than 4 times per year     Marital Status:    Intimate Partner Violence: Not At Risk (12/4/2024)    Humiliation, Afraid, Rape, and Kick questionnaire     Fear of Current or Ex-Partner: No     Emotionally Abused: No     Physically Abused: No     Sexually Abused: No   Housing Stability: Low Risk  (12/4/2024)    Housing Stability Vital Sign     Unable to Pay for Housing in the Last Year: No     Number of Times Moved in the Last Year: 0     Homeless in the Last Year: No       Current Outpatient Medications   Medication Sig Dispense Refill    furosemide (LASIX) 20 MG Tab Take 1 Tablet by mouth see administration instructions. Once daily as needed for edema 30 Tablet 2    potassium chloride SA (KDUR) 20 MEQ Tab CR TAKE 1 TABLET BY MOUTH TWICE DAILY 60 Tablet 5    zolpidem (AMBIEN) 10 MG Tab Take 1 Tablet by mouth at bedtime as  needed for Sleep for up to 90 days. 30 Tablet 2    ondansetron (ZOFRAN ODT) 4 MG TABLET DISPERSIBLE Take 1 Tablet by mouth every 6 hours as needed for Nausea/Vomiting. 20 Tablet 5    naratriptan (AMERGE) 2.5 MG tablet Take 1 Tablet by mouth as needed for Migraine. 5 Tablet 3    prednisONE (DELTASONE) 2.5 MG Tab Take 1 Tablet by mouth 2 times a day. 10 Tablet 0    spironolactone (ALDACTONE) 25 MG Tab Take 1 Tablet by mouth every day. 90 Tablet 3    nystatin (MYCOSTATIN) 712498 UNIT/ML Suspension Take 5 mL by mouth 4 times a day. 60 mL 0    Ascorbic Acid (VITAMIN C GUMMIE PO) Take 2 Tablets by mouth every day.      Biotin-Vitamin C (HAIR SKIN NAILS GUMMIES PO) Take 2 Tablets by mouth every day.      Ca Phosphate-Cholecalciferol (CALCIUM/VITAMIN D3 GUMMIES PO) Take 2 Tablets by mouth every day.      POTASSIUM CITRATE PO Take 2 Tablets by mouth every day. Gummy      Multiple Vitamins-Minerals (MULTI-VITAMIN GUMMIES PO) Take 2 Tablets by mouth every day.      Probiotic Product (PROBIOTIC GUMMIES PO) Take 2 Tablets by mouth every day.      prochlorperazine (COMPAZINE) 10 MG Tab Take 1 Tablet by mouth every 6 hours as needed for Nausea/Vomiting. 30 Tablet 3    Azelaic Acid 15 % Gel Apply 1 Application topically every day. Apply's on face      hydroxychloroquine (PLAQUENIL) 200 MG Tab Take 1 Tablet by mouth every day. 90 Tablet 3    metoprolol tartrate (LOPRESSOR) 50 MG Tab Take 1 Tablet by mouth 2 times a day. 180 Tablet 3    levalbuterol (XOPENEX HFA) 45 MCG/ACT inhaler Inhale 2 Puffs every four hours as needed for Shortness of Breath (As needed for shortness of breath, cough, wheezing.). 1 Each 11    oxyCODONE immediate release (ROXICODONE) 10 MG immediate release tablet Take 1 Tablet by mouth every 6 hours as needed for Moderate Pain (Refill #3 of #3) for up to 30 days. 60 Tablet 0     No current facility-administered medications for this visit.      REVIEW OF SYSTEMS:   Review of systems (+10 systems) unremarkable  except for concerns noted by patient or items listed. Please see HPI for additional ROS.    PHYSICAL EXAM:  Pulse 68   Temp 36.2 °C (97.1 °F) (Temporal)   Ht 1.829 m (6')   Wt 59.4 kg (131 lb)   SpO2 93%   BMI 17.77 kg/m²     Wt Readings from Last 1 Encounters:   04/02/25 59.4 kg (131 lb)     Constitutional:  thin, frail, in no apparent distress, alert and oriented x 3  Eyes:   sclera is anicteric, PERRLA  Head:   normocephalic, atraumatic, no scalp lesions  Mouth/Throat:  normal, moist mucous membranes  Neck:   supple, no JVD, no tracheal deviation  Chest:   good respiratory effort, no accessory muscle use  Cardiac:  normal rate, regular rhythm   Abdomen:  soft, non-tender, non-distended, no rebound/guarding, no palpable bulges or masses, + ileostomy in LLQ   Incisions:  previous abdominal incisions are present - no evidence of bulges or infection  Extremities:  normal strength, tone, and muscle mass; no clubbing, cyanosis, or edema  bilaterally  Neurologic:  normal without focal findings, cognition is intact, CN II - XII are grossly intact  Skin:   no jaundice, no rashes or significant lesions, no bruising  Psych:   good judgement, mood and affect are appropriate, excellent hygiene    DATA REVIEW:  EGD:  Not available for review    High Resolution Esophageal Manometry:  Not available for review    Fluoro UGI / Esophagram  (Date: 10/14/2023):    FINDINGS:     Esophageal motility:  Moderate mid and distal esophageal dysmotility.  Esophageal distensibility: Distal esophageal lumen is narrowed down to less than 5 mm, consistent with a stricture. Normal distensibility of the midesophagus. Several areas of minimal narrowing of the proximal esophagus, down to 10 mm.  Esophageal mucosa:  No mucosal lesions or webs.  Gastroesophageal junction:  No evidence of hiatal hernia.  Gastroesophageal reflux:  Present.     IMPRESSION:     1.  Distal esophageal lumen is narrowed down to less than 5 mm, likely representing a  distal esophageal stricture. It does not widen during the examination.  2.  Several areas of minimal luminal narrowing of the proximal esophagus, down to 10 mm.  3.  Moderate mid and distal esophageal dysmotility.  4.  Gastroesophageal reflux.    Additional Imaging:     CT of Abdomen/Pelvis w/o Contrast  (Date: 12/4/2024):    FINDINGS:  Lower Chest: There is linear atelectasis versus scarring within the left lung base..     Liver: Normal.     Spleen: Unremarkable.     Pancreas: Unremarkable.     Gallbladder: No calcified gallstones.     Biliary: Nondilated.     Adrenal glands: Normal.     Kidneys: Unremarkable without hydronephrosis.     Bowel: There is a left lower quadrant ostomy site. No evidence of bowel obstruction or focal inflammatory change. There has been prior colectomy..     Lymph nodes: No adenopathy.     Vasculature: Atherosclerotic plaque.     Peritoneum: Unremarkable without ascites.     Musculoskeletal: No acute or destructive process.     Pelvis: No adenopathy or free fluid.     IMPRESSION:     1.  Prior colectomy with left lower quadrant ostomy site.     2.  No evidence of bowel obstruction or focal inflammatory change.     3.  Linear atelectasis versus scarring within the left lung base.    ASSESSMENT AND PLAN:  Ms. Jana Overton is a pleasant 69 y.o. year old female, who presents with significant, worsening, symptoms of dysphagia with regurgitation, in the setting of a known diagnosis of achalasia. She will need the following additional testing prior to being considered for definitive surgical therapy: diagnostic EGD w/ or w/o biopsies or dilation (will request copies of her most recent endoscopies - last one done ~ 3 months ago), a repeat Fluoro UGI w/ Barium study, and likely a repeat High Resolution Esophageal Manometry.    We discussed, at great length, the choices of operative procedures and their associated risks, benefits, and alternatives.    The following treatment options were  discussed:    - Robotic vs laparoscopic Heller (esophagocardio-) myotomy with a possible hiatal hernia repair and partial (Toupet vs Ronnell) fundoplication    - Per Oral Endoscopy Myotomy (POEM) - will need to be referred to a center that offers these procedures    - Endoscopic dilation and/or Botox injections    The patient understands the treatment options, as mentioned above, and all questions were answered to her complete satisfaction. Ms. Jana Overton will return to our clinic if she chooses to proceed with the surgical management of achalasia (Heller myotomy) and after she obtains all the appropriate workup (as mentioned above). Of note, she is scheduled to be evaluated at the [facility] GI motility clinic this summer and will be seeking out a second opinion consultation there, for a possible POEM procedure as the first line approach to manage her achalasia.    Thank you for giving us the opportunity to care for your patient.      Sincerely,    Jace Jose MD MPH FACS [facility]  Bariatric and Gastroesophageal Surgery  Department of Surgery, [facility]  Clinical Assistant Professor of Surgery  [facility]    4/2/2025

## 2025-04-04 RX ORDER — OXYCODONE HYDROCHLORIDE 10 MG/1
10 TABLET ORAL EVERY 6 HOURS PRN
Qty: 60 TABLET | Refills: 0 | Status: SHIPPED | OUTPATIENT
Start: 2025-04-04 | End: 2025-04-24 | Stop reason: SDUPTHER

## 2025-04-04 NOTE — TELEPHONE ENCOUNTER
Received request via: Patient    Was the patient seen in the last year in this department? Yes    Does the patient have an active prescription (recently filled or refills available) for medication(s) requested? No    Pharmacy Name: [facility]     Does the patient have USP Plus and need 100-day supply? (This applies to ALL medications) Patient does not have SCP

## 2025-04-24 ENCOUNTER — OFFICE VISIT (OUTPATIENT)
Dept: MEDICAL GROUP | Facility: LAB | Age: 70
End: 2025-04-24
Payer: MEDICARE

## 2025-04-24 VITALS
SYSTOLIC BLOOD PRESSURE: 120 MMHG | HEART RATE: 86 BPM | RESPIRATION RATE: 16 BRPM | DIASTOLIC BLOOD PRESSURE: 62 MMHG | WEIGHT: 126.1 LBS | HEIGHT: 72 IN | TEMPERATURE: 97.8 F | OXYGEN SATURATION: 97 % | BODY MASS INDEX: 17.08 KG/M2

## 2025-04-24 DIAGNOSIS — K50.019 CROHN'S DISEASE OF SMALL INTESTINE WITH COMPLICATION (HCC): ICD-10-CM

## 2025-04-24 DIAGNOSIS — R19.7 DIARRHEA, UNSPECIFIED TYPE: ICD-10-CM

## 2025-04-24 DIAGNOSIS — K22.0 ACHALASIA: ICD-10-CM

## 2025-04-24 DIAGNOSIS — K21.9 GASTROESOPHAGEAL REFLUX DISEASE WITHOUT ESOPHAGITIS: ICD-10-CM

## 2025-04-24 DIAGNOSIS — R63.4 ABNORMAL WEIGHT LOSS: ICD-10-CM

## 2025-04-24 DIAGNOSIS — M54.50 LOW BACK PAIN, UNSPECIFIED BACK PAIN LATERALITY, UNSPECIFIED CHRONICITY, UNSPECIFIED WHETHER SCIATICA PRESENT: ICD-10-CM

## 2025-04-24 PROCEDURE — 3074F SYST BP LT 130 MM HG: CPT | Performed by: INTERNAL MEDICINE

## 2025-04-24 PROCEDURE — 99214 OFFICE O/P EST MOD 30 MIN: CPT | Performed by: INTERNAL MEDICINE

## 2025-04-24 PROCEDURE — 3078F DIAST BP <80 MM HG: CPT | Performed by: INTERNAL MEDICINE

## 2025-04-24 RX ORDER — OXYCODONE HYDROCHLORIDE 10 MG/1
10 TABLET ORAL EVERY 6 HOURS PRN
Qty: 60 TABLET | Refills: 0 | Status: SHIPPED | OUTPATIENT
Start: 2025-06-02 | End: 2025-07-02

## 2025-04-24 RX ORDER — OXYCODONE HYDROCHLORIDE 10 MG/1
10 TABLET ORAL EVERY 6 HOURS PRN
Qty: 60 TABLET | Refills: 0 | Status: SHIPPED | OUTPATIENT
Start: 2025-05-03 | End: 2025-06-02

## 2025-04-24 RX ORDER — PREDNISONE 2.5 MG/1
TABLET ORAL
Qty: 10 TABLET | Refills: 0 | Status: SHIPPED | OUTPATIENT
Start: 2025-04-24 | End: 2025-04-28

## 2025-04-24 RX ORDER — OXYCODONE HYDROCHLORIDE 10 MG/1
10 TABLET ORAL EVERY 6 HOURS PRN
Qty: 60 TABLET | Refills: 0 | Status: SHIPPED | OUTPATIENT
Start: 2025-07-02 | End: 2025-08-01

## 2025-04-24 RX ORDER — DRONABINOL 2.5 MG/1
2.5 CAPSULE ORAL 2 TIMES DAILY
Qty: 60 CAPSULE | Refills: 0 | Status: SHIPPED | OUTPATIENT
Start: 2025-04-24 | End: 2025-05-24

## 2025-04-24 ASSESSMENT — FIBROSIS 4 INDEX: FIB4 SCORE: 2.62

## 2025-04-24 NOTE — PROGRESS NOTES
CC: Jana Overton is a 69 y.o. female is suffering from   Chief Complaint   Patient presents with    Numbness     Feet and legs, left worse than right    Dizziness     Few episodes per day     Weight Loss         SUBJECTIVE:  1. Abnormal weight loss  Jana is here for follow-up, states that she has had some abnormal weight loss, and is otherwise doing okay    2. Achalasia  History of achalasia with recommendations from gastroenterology x 2 that she needs treatment at [facility]    3. Crohn's disease of small intestine with complication (HCC)  Crohn's disease leading to colostomy    4. Gastroesophageal reflux disease without esophagitis  Gastroesophageal reflux with stricturing    5. Diarrhea, unspecified type  Diarrhea secondary to #3 above    6. Low back pain, unspecified back pain laterality, unspecified chronicity, unspecified whether sciatica present  Followed locally by Dr. Louis Monahan    History of Present Illness              Past social, family, history: , Goyo  Social History     Tobacco Use    Smoking status: Never    Smokeless tobacco: Never    Tobacco comments:     second hand smoke parents - smoked for only 1 year many years ago   Vaping Use    Vaping status: Some Days    Substances: THC   Substance Use Topics    Alcohol use: Not Currently     Alcohol/week: 0.6 oz     Types: 1 Shots of liquor per week     Comment: gin and half a lime; tonic water    Drug use: Not Currently     Types: Marijuana, Inhaled     Comment: medical marijuana through bong/vape         MEDICATIONS:    Current Outpatient Medications:     dronabinol (MARINOL) 2.5 MG Cap, Take 1 Capsule by mouth 2 times a day for 30 days., Disp: 60 Capsule, Rfl: 0    [START ON 5/3/2025] oxyCODONE immediate release (ROXICODONE) 10 MG immediate release tablet, Take 1 Tablet by mouth every 6 hours as needed for Moderate Pain (Refill #1 of #3) for up to 30 days., Disp: 60 Tablet, Rfl: 0    [START ON 6/2/2025] oxyCODONE immediate release  (ROXICODONE) 10 MG immediate release tablet, Take 1 Tablet by mouth every 6 hours as needed for Moderate Pain (Refill #2 of #3) for up to 30 days., Disp: 60 Tablet, Rfl: 0    [START ON 7/2/2025] oxyCODONE immediate release (ROXICODONE) 10 MG immediate release tablet, Take 1 Tablet by mouth every 6 hours as needed for Moderate Pain (Refill #3 of #3) for up to 30 days., Disp: 60 Tablet, Rfl: 0    prednisONE (DELTASONE) 2.5 MG Tab, 1 tablet po bid, Disp: 10 Tablet, Rfl: 0    furosemide (LASIX) 20 MG Tab, Take 1 Tablet by mouth see administration instructions. Once daily as needed for edema, Disp: 30 Tablet, Rfl: 2    potassium chloride SA (KDUR) 20 MEQ Tab CR, TAKE 1 TABLET BY MOUTH TWICE DAILY, Disp: 60 Tablet, Rfl: 5    ondansetron (ZOFRAN ODT) 4 MG TABLET DISPERSIBLE, Take 1 Tablet by mouth every 6 hours as needed for Nausea/Vomiting., Disp: 20 Tablet, Rfl: 5    naratriptan (AMERGE) 2.5 MG tablet, Take 1 Tablet by mouth as needed for Migraine., Disp: 5 Tablet, Rfl: 3    prednisONE (DELTASONE) 2.5 MG Tab, Take 1 Tablet by mouth 2 times a day., Disp: 10 Tablet, Rfl: 0    spironolactone (ALDACTONE) 25 MG Tab, Take 1 Tablet by mouth every day., Disp: 90 Tablet, Rfl: 3    nystatin (MYCOSTATIN) 024302 UNIT/ML Suspension, Take 5 mL by mouth 4 times a day., Disp: 60 mL, Rfl: 0    Ascorbic Acid (VITAMIN C GUMMIE PO), Take 2 Tablets by mouth every day., Disp: , Rfl:     Biotin-Vitamin C (HAIR SKIN NAILS GUMMIES PO), Take 2 Tablets by mouth every day., Disp: , Rfl:     Ca Phosphate-Cholecalciferol (CALCIUM/VITAMIN D3 GUMMIES PO), Take 2 Tablets by mouth every day., Disp: , Rfl:     POTASSIUM CITRATE PO, Take 2 Tablets by mouth every day. Gummy, Disp: , Rfl:     Multiple Vitamins-Minerals (MULTI-VITAMIN GUMMIES PO), Take 2 Tablets by mouth every day., Disp: , Rfl:     Probiotic Product (PROBIOTIC GUMMIES PO), Take 2 Tablets by mouth every day., Disp: , Rfl:     prochlorperazine (COMPAZINE) 10 MG Tab, Take 1 Tablet by mouth  every 6 hours as needed for Nausea/Vomiting., Disp: 30 Tablet, Rfl: 3    Azelaic Acid 15 % Gel, Apply 1 Application topically every day. Apply's on face, Disp: , Rfl:     hydroxychloroquine (PLAQUENIL) 200 MG Tab, Take 1 Tablet by mouth every day., Disp: 90 Tablet, Rfl: 3    metoprolol tartrate (LOPRESSOR) 50 MG Tab, Take 1 Tablet by mouth 2 times a day., Disp: 180 Tablet, Rfl: 3    levalbuterol (XOPENEX HFA) 45 MCG/ACT inhaler, Inhale 2 Puffs every four hours as needed for Shortness of Breath (As needed for shortness of breath, cough, wheezing.)., Disp: 1 Each, Rfl: 11    PROBLEMS:  Patient Active Problem List    Diagnosis Date Noted    FLORES (acute kidney injury) ([facility]) 12/04/2024    Metabolic acidosis 12/04/2024    Increased ostomy output 12/04/2024    Intractable nausea and vomiting 12/04/2024    ACP (advance care planning) 12/04/2024    Easy bruising 12/04/2024    Underweight (BMI < 18.5) 11/27/2024    Thrombocytopenia ([facility]) 12/11/2023    Chronic diastolic congestive heart failure ([facility]) 12/11/2023    Chronic kidney disease, stage 3b 11/22/2023    Transient alteration of awareness 10/26/2023    Achalasia 10/18/2023    Esophageal candidiasis ([facility]) 02/08/2023    Sacroiliac joint pain 11/10/2022    Esophageal stricture 10/22/2022    Dysphagia 10/21/2022    Drug-induced lupus erythematosus 09/27/2022    Long-term use of hydroxychloroquine 09/27/2022    Fibromyalgia syndrome 09/27/2022    Primary osteoarthritis of both hands 07/28/2022    Positive cardiolipin antibodies (IgA and IgG anti-CL) 07/27/2022    Positive VAHID (1:640 homogenous pattern with negative reflex) 07/27/2022    Inflammatory arthritis 07/27/2022    Mild tetrahydrocannabinol (THC) abuse 06/02/2022    Oropharyngeal dysphagia 06/02/2022    Acute pancreatitis 05/23/2022    Migraine 06/04/2019    Essential hypertension 05/20/2019    SIRS (systemic inflammatory response syndrome) (HCC) 05/19/2019    History of MRSA infection 12/29/2018    History of infection  with vancomycin resistant Enterococcus (VRE) 12/29/2018    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[facility]) 12/28/2018    Anemia 12/15/2018    Dehydration 12/12/2018    Hyponatremia 12/12/2018    Leukocytosis 12/12/2018    Anxiety disorder due to multiple medical problems 12/01/2017    DDD (degenerative disc disease), lumbar 04/12/2017    History of DVT (deep vein thrombosis) 11/23/2016    Atrial fibrillation ([facility]) 03/26/2015    Sleep apnea 10/26/2014    Postherpetic neuralgia 06/24/2014    Multiple falls 06/24/2014    COPD (chronic obstructive pulmonary disease) ([facility]) 06/24/2014    Rosacea 06/24/2014    Ileostomy in place ([facility]) 06/26/2013    GERD (gastroesophageal reflux disease) 12/05/2012    Status post lumbar surgery 07/16/2012    Crohn's disease of small intestine with complication ([facility]) 09/23/2009    Central sensitization to pain 09/23/2009    Opioid type dependence, continuous (CMS-HCC) 09/23/2009    Degenerative joint disease of cervical and lumbar spine 09/23/2009       REVIEW OF SYSTEMS:  Gen.:  No Nausea, Vomiting, fever, Chills.  Heart: No chest pain.  Lungs:  No shortness of Breath.  Psychological: Anxiety and depression consistent with patient's significant medical issues     PHYSICAL EXAM   Physical Exam             Constitutional: Alert, cooperative, not in acute distress.  Cardiovascular:  Rate Rhythm is regular without murmurs rubs clicks.     Thorax & Lungs: Clear to auscultation, no wheezing, rhonchi, or rales  HENT: Normocephalic, Atraumatic.  Eyes: PERRLA, EOMI, Conjunctiva normal.   Neck: Trachia is midline no swelling of the thyroid.   Lymphatic: No lymphadenopathy noted.   Musculoskeletal: Significant loss of forward flexion extension, sidebending rotation associated with pain  Neurologic: Alert & oriented x 3, cranial nerves II through XII are intact, Normal motor function, Normal sensory function, No focal deficits noted.   Psychiatric: Affect normal, Judgment normal, Mood normal.     VITAL SIGNS:/62 (BP  Location: Left arm, Patient Position: Sitting, BP Cuff Size: Adult)   Pulse 86   Temp 36.6 °C (97.8 °F) (Temporal)   Resp 16   Ht 1.829 m (6')   Wt 57.2 kg (126 lb 1.7 oz)   SpO2 97%   BMI 17.10 kg/m²     Labs: Reviewed    Assessment:                                                     Plan:  [unfilled]             1. Abnormal weight loss  Start Marinol also start prednisone low-dose  - dronabinol (MARINOL) 2.5 MG Cap; Take 1 Capsule by mouth 2 times a day for 30 days.  Dispense: 60 Capsule; Refill: 0  - prednisONE (DELTASONE) 2.5 MG Tab; 1 tablet po bid  Dispense: 10 Tablet; Refill: 0    2. Achalasia  Referral to [facility] upon request  - dronabinol (MARINOL) 2.5 MG Cap; Take 1 Capsule by mouth 2 times a day for 30 days.  Dispense: 60 Capsule; Refill: 0    3. Crohn's disease of small intestine with complication (HCC)  No change in medical therapy continue Roxicodone  - oxyCODONE immediate release (ROXICODONE) 10 MG immediate release tablet; Take 1 Tablet by mouth every 6 hours as needed for Moderate Pain (Refill #1 of #3) for up to 30 days.  Dispense: 60 Tablet; Refill: 0  - oxyCODONE immediate release (ROXICODONE) 10 MG immediate release tablet; Take 1 Tablet by mouth every 6 hours as needed for Moderate Pain (Refill #2 of #3) for up to 30 days.  Dispense: 60 Tablet; Refill: 0  - oxyCODONE immediate release (ROXICODONE) 10 MG immediate release tablet; Take 1 Tablet by mouth every 6 hours as needed for Moderate Pain (Refill #3 of #3) for up to 30 days.  Dispense: 60 Tablet; Refill: 0  - prednisONE (DELTASONE) 2.5 MG Tab; 1 tablet po bid  Dispense: 10 Tablet; Refill: 0    4. Gastroesophageal reflux disease without esophagitis  History of esophageal dilatation clinically stable  - oxyCODONE immediate release (ROXICODONE) 10 MG immediate release tablet; Take 1 Tablet by mouth every 6 hours as needed for Moderate Pain (Refill #1 of #3) for up to 30 days.  Dispense: 60 Tablet; Refill: 0  - oxyCODONE immediate  release (ROXICODONE) 10 MG immediate release tablet; Take 1 Tablet by mouth every 6 hours as needed for Moderate Pain (Refill #2 of #3) for up to 30 days.  Dispense: 60 Tablet; Refill: 0  - oxyCODONE immediate release (ROXICODONE) 10 MG immediate release tablet; Take 1 Tablet by mouth every 6 hours as needed for Moderate Pain (Refill #3 of #3) for up to 30 days.  Dispense: 60 Tablet; Refill: 0    5. Diarrhea, unspecified type  Continue Roxicodone  - oxyCODONE immediate release (ROXICODONE) 10 MG immediate release tablet; Take 1 Tablet by mouth every 6 hours as needed for Moderate Pain (Refill #1 of #3) for up to 30 days.  Dispense: 60 Tablet; Refill: 0  - oxyCODONE immediate release (ROXICODONE) 10 MG immediate release tablet; Take 1 Tablet by mouth every 6 hours as needed for Moderate Pain (Refill #2 of #3) for up to 30 days.  Dispense: 60 Tablet; Refill: 0  - oxyCODONE immediate release (ROXICODONE) 10 MG immediate release tablet; Take 1 Tablet by mouth every 6 hours as needed for Moderate Pain (Refill #3 of #3) for up to 30 days.  Dispense: 60 Tablet; Refill: 0    6. Low back pain, unspecified back pain laterality, unspecified chronicity, unspecified whether sciatica present  Start prednisone  - prednisONE (DELTASONE) 2.5 MG Tab; 1 tablet po bid  Dispense: 10 Tablet; Refill: 0      Jamarcus Davalos MD [facility], Stuart Payan MD at [facility].  Both, per patient, have recommended that achalasia needs treatment at [facility].

## 2025-05-15 ENCOUNTER — APPOINTMENT (OUTPATIENT)
Dept: ADMISSIONS | Facility: MEDICAL CENTER | Age: 70
End: 2025-05-15
Attending: NEUROLOGICAL SURGERY
Payer: MEDICARE

## 2025-05-16 ENCOUNTER — APPOINTMENT (OUTPATIENT)
Dept: RADIOLOGY | Facility: MEDICAL CENTER | Age: 70
End: 2025-05-16
Attending: NEUROLOGICAL SURGERY
Payer: MEDICARE

## 2025-05-16 ENCOUNTER — HOSPITAL ENCOUNTER (OUTPATIENT)
Facility: MEDICAL CENTER | Age: 70
End: 2025-05-16
Attending: NEUROLOGICAL SURGERY | Admitting: NEUROLOGICAL SURGERY
Payer: MEDICARE

## 2025-05-16 VITALS
BODY MASS INDEX: 17.8 KG/M2 | DIASTOLIC BLOOD PRESSURE: 64 MMHG | HEIGHT: 72 IN | RESPIRATION RATE: 14 BRPM | WEIGHT: 131.39 LBS | SYSTOLIC BLOOD PRESSURE: 130 MMHG | OXYGEN SATURATION: 99 % | HEART RATE: 84 BPM | TEMPERATURE: 97.2 F

## 2025-05-16 DIAGNOSIS — G96.198 OTHER DISORDERS OF MENINGES, NOT ELSEWHERE CLASSIFIED: ICD-10-CM

## 2025-05-16 LAB
ERYTHROCYTE [DISTWIDTH] IN BLOOD BY AUTOMATED COUNT: 43 FL (ref 35.9–50)
HCT VFR BLD AUTO: 38.1 % (ref 37–47)
HGB BLD-MCNC: 12.3 G/DL (ref 12–16)
INR PPP: 0.99 (ref 0.87–1.13)
MCH RBC QN AUTO: 30.1 PG (ref 27–33)
MCHC RBC AUTO-ENTMCNC: 32.3 G/DL (ref 32.2–35.5)
MCV RBC AUTO: 93.4 FL (ref 81.4–97.8)
PLATELET # BLD AUTO: 189 K/UL (ref 164–446)
PMV BLD AUTO: 10.2 FL (ref 9–12.9)
PROTHROMBIN TIME: 13.1 SEC (ref 12–14.6)
RBC # BLD AUTO: 4.08 M/UL (ref 4.2–5.4)
WBC # BLD AUTO: 8.5 K/UL (ref 4.8–10.8)

## 2025-05-16 PROCEDURE — 72132 CT LUMBAR SPINE W/DYE: CPT

## 2025-05-16 PROCEDURE — 700117 HCHG RX CONTRAST REV CODE 255: Performed by: NEUROLOGICAL SURGERY

## 2025-05-16 PROCEDURE — 85027 COMPLETE CBC AUTOMATED: CPT

## 2025-05-16 PROCEDURE — 160046 HCHG PACU - 1ST 60 MINS PHASE II

## 2025-05-16 PROCEDURE — A9270 NON-COVERED ITEM OR SERVICE: HCPCS

## 2025-05-16 PROCEDURE — 85610 PROTHROMBIN TIME: CPT

## 2025-05-16 PROCEDURE — 160015 HCHG STAT PREOP MINUTES

## 2025-05-16 PROCEDURE — 160047 HCHG PACU  - EA ADDL 30 MINS PHASE II

## 2025-05-16 PROCEDURE — 700102 HCHG RX REV CODE 250 W/ 637 OVERRIDE(OP)

## 2025-05-16 PROCEDURE — 62284 INJECTION FOR MYELOGRAM: CPT

## 2025-05-16 PROCEDURE — 160002 HCHG RECOVERY MINUTES (STAT)

## 2025-05-16 RX ORDER — DIAZEPAM 5 MG/1
5 TABLET ORAL ONCE
Status: COMPLETED | OUTPATIENT
Start: 2025-05-16 | End: 2025-05-16

## 2025-05-16 RX ORDER — DIAZEPAM 5 MG/1
5 TABLET ORAL EVERY 6 HOURS PRN
COMMUNITY

## 2025-05-16 RX ORDER — ACETAMINOPHEN 325 MG/1
650 TABLET ORAL
Status: COMPLETED | OUTPATIENT
Start: 2025-05-16 | End: 2025-05-16

## 2025-05-16 RX ADMIN — IOHEXOL 10 ML: 180 INJECTION INTRAVENOUS at 14:45

## 2025-05-16 RX ADMIN — IOHEXOL 10 ML: 180 INJECTION INTRAVENOUS at 13:20

## 2025-05-16 RX ADMIN — ACETAMINOPHEN 650 MG: 325 TABLET ORAL at 13:33

## 2025-05-16 RX ADMIN — DIAZEPAM 5 MG: 5 TABLET ORAL at 11:58

## 2025-05-16 ASSESSMENT — FIBROSIS 4 INDEX: FIB4 SCORE: 2.62

## 2025-05-16 NOTE — OR NURSING
@[id number] Pt arrived to Phase 2. Pt has complaints of 7/10 headache. Pt medicated per mar. Dressing site is clean dry intact and soft.

## 2025-05-16 NOTE — OR NURSING
1335 assumed care of patient.    1338  updated patient in recovery    1415 patient tolerating food and water    1500 patient up to bathroom, steady on feet. Mild headache, tolerable per patient.     1510 All lines and monitors discontinued. Reviewed discharge paperwork with pt. Discussed diet, activity, medications, follow up care and worsening symptoms. No questions at this time.

## 2025-05-16 NOTE — DISCHARGE INSTRUCTIONS
Care After Myelogram, Lumbar Puncture    Follow these instructions at home:  General instructions  Resume taking your medication in accordance with your normal schedule.  Reach out to your doctor if you have any questions about your prescriptions or other over-the-counter medications  May remove Band-Aid in 24 hours. Do not submerge site in water for 7 days.  Keep all follow-up visits. Ask if you need an appointment to get your results.  You may feel sore at the injection site. You may have a mild headache.  Do not bend, lift, or do hard work for 24-48 hours.  Contact a doctor if:  You have a fever or chills  You have a headache that lasts longer than 24 hours or gets worse  You nausea or vomiting   Your neck is stiff  You cannot urinate or move your bowels  You have a rash or are itchy or sneezing  You have signs of infection at your puncture site: redness, swelling, warmth, more painful, or you have fluid, pus, blood, or a bad smell coming from your puncture site  You have new symptoms or your symptoms get worse  Get help right away if:  You have a seizure or feel like you might faint   You have trouble breathing  You have a severe headache  You are unable to control your bowel and bladder (incontinence)   You develop weakness, numbness, or tingling in your legs

## 2025-05-18 ENCOUNTER — ANESTHESIA (OUTPATIENT)
Dept: SURGERY | Facility: MEDICAL CENTER | Age: 70
DRG: 660 | End: 2025-05-18
Payer: MEDICARE

## 2025-05-18 ENCOUNTER — APPOINTMENT (OUTPATIENT)
Dept: RADIOLOGY | Facility: MEDICAL CENTER | Age: 70
DRG: 660 | End: 2025-05-18
Attending: EMERGENCY MEDICINE
Payer: MEDICARE

## 2025-05-18 ENCOUNTER — ANESTHESIA EVENT (OUTPATIENT)
Dept: SURGERY | Facility: MEDICAL CENTER | Age: 70
DRG: 660 | End: 2025-05-18
Payer: MEDICARE

## 2025-05-18 ENCOUNTER — HOSPITAL ENCOUNTER (INPATIENT)
Facility: MEDICAL CENTER | Age: 70
LOS: 1 days | DRG: 660 | End: 2025-05-19
Attending: EMERGENCY MEDICINE | Admitting: HOSPITALIST
Payer: MEDICARE

## 2025-05-18 ENCOUNTER — APPOINTMENT (OUTPATIENT)
Dept: RADIOLOGY | Facility: MEDICAL CENTER | Age: 70
DRG: 660 | End: 2025-05-18
Attending: STUDENT IN AN ORGANIZED HEALTH CARE EDUCATION/TRAINING PROGRAM
Payer: MEDICARE

## 2025-05-18 DIAGNOSIS — N20.0 NEPHROLITHIASIS: Primary | ICD-10-CM

## 2025-05-18 PROBLEM — F12.10 MILD TETRAHYDROCANNABINOL (THC) ABUSE: Status: RESOLVED | Noted: 2022-06-02 | Resolved: 2025-05-18

## 2025-05-18 LAB
ALBUMIN SERPL BCP-MCNC: 4.8 G/DL (ref 3.2–4.9)
ALBUMIN/GLOB SERPL: 1.5 G/DL
ALP SERPL-CCNC: 110 U/L (ref 30–99)
ALT SERPL-CCNC: 30 U/L (ref 2–50)
ANION GAP SERPL CALC-SCNC: 16 MMOL/L (ref 7–16)
APPEARANCE UR: ABNORMAL
AST SERPL-CCNC: 37 U/L (ref 12–45)
BACTERIA #/AREA URNS HPF: ABNORMAL /HPF
BASOPHILS # BLD AUTO: 0.6 % (ref 0–1.8)
BASOPHILS # BLD: 0.06 K/UL (ref 0–0.12)
BILIRUB SERPL-MCNC: 0.5 MG/DL (ref 0.1–1.5)
BILIRUB UR QL STRIP.AUTO: NEGATIVE
BUN SERPL-MCNC: 23 MG/DL (ref 8–22)
CALCIUM ALBUM COR SERPL-MCNC: 9.6 MG/DL (ref 8.5–10.5)
CALCIUM SERPL-MCNC: 10.2 MG/DL (ref 8.5–10.5)
CASTS URNS QL MICRO: ABNORMAL /LPF (ref 0–2)
CHLORIDE SERPL-SCNC: 104 MMOL/L (ref 96–112)
CK SERPL-CCNC: 116 U/L (ref 0–154)
CO2 SERPL-SCNC: 21 MMOL/L (ref 20–33)
COLOR UR: ABNORMAL
CREAT SERPL-MCNC: 1.62 MG/DL (ref 0.5–1.4)
EKG IMPRESSION: NORMAL
EOSINOPHIL # BLD AUTO: 0.04 K/UL (ref 0–0.51)
EOSINOPHIL NFR BLD: 0.4 % (ref 0–6.9)
EPITHELIAL CELLS 1715: ABNORMAL /HPF (ref 0–5)
ERYTHROCYTE [DISTWIDTH] IN BLOOD BY AUTOMATED COUNT: 44 FL (ref 35.9–50)
GFR SERPLBLD CREATININE-BSD FMLA CKD-EPI: 34 ML/MIN/1.73 M 2
GLOBULIN SER CALC-MCNC: 3.2 G/DL (ref 1.9–3.5)
GLUCOSE SERPL-MCNC: 107 MG/DL (ref 65–99)
GLUCOSE UR STRIP.AUTO-MCNC: NEGATIVE MG/DL
HCT VFR BLD AUTO: 43.8 % (ref 37–47)
HGB BLD-MCNC: 14.2 G/DL (ref 12–16)
IMM GRANULOCYTES # BLD AUTO: 0.06 K/UL (ref 0–0.11)
IMM GRANULOCYTES NFR BLD AUTO: 0.6 % (ref 0–0.9)
KETONES UR STRIP.AUTO-MCNC: NEGATIVE MG/DL
LEUKOCYTE ESTERASE UR QL STRIP.AUTO: ABNORMAL
LIPASE SERPL-CCNC: 32 U/L (ref 11–82)
LYMPHOCYTES # BLD AUTO: 1.11 K/UL (ref 1–4.8)
LYMPHOCYTES NFR BLD: 10.4 % (ref 22–41)
MAGNESIUM SERPL-MCNC: 1.7 MG/DL (ref 1.5–2.5)
MCH RBC QN AUTO: 30.4 PG (ref 27–33)
MCHC RBC AUTO-ENTMCNC: 32.4 G/DL (ref 32.2–35.5)
MCV RBC AUTO: 93.8 FL (ref 81.4–97.8)
MICRO URNS: ABNORMAL
MONOCYTES # BLD AUTO: 0.77 K/UL (ref 0–0.85)
MONOCYTES NFR BLD AUTO: 7.2 % (ref 0–13.4)
MUCOUS THREADS URNS QL MICRO: PRESENT /HPF
NEUTROPHILS # BLD AUTO: 8.64 K/UL (ref 1.82–7.42)
NEUTROPHILS NFR BLD: 80.8 % (ref 44–72)
NITRITE UR QL STRIP.AUTO: POSITIVE
NRBC # BLD AUTO: 0 K/UL
NRBC BLD-RTO: 0 /100 WBC (ref 0–0.2)
PH UR STRIP.AUTO: 6 [PH] (ref 5–8)
PLATELET # BLD AUTO: 242 K/UL (ref 164–446)
PMV BLD AUTO: 10.3 FL (ref 9–12.9)
POTASSIUM SERPL-SCNC: 4.3 MMOL/L (ref 3.6–5.5)
PROCALCITONIN SERPL-MCNC: 0.09 NG/ML
PROT SERPL-MCNC: 8 G/DL (ref 6–8.2)
PROT UR QL STRIP: >=300 MG/DL
RBC # BLD AUTO: 4.67 M/UL (ref 4.2–5.4)
RBC # URNS HPF: >100 /HPF
RBC UR QL AUTO: ABNORMAL
SODIUM SERPL-SCNC: 141 MMOL/L (ref 135–145)
SP GR UR STRIP.AUTO: >=1.03
UROBILINOGEN UR STRIP.AUTO-MCNC: 0.2 EU/DL
WBC # BLD AUTO: 10.7 K/UL (ref 4.8–10.8)
WBC #/AREA URNS HPF: ABNORMAL /HPF

## 2025-05-18 PROCEDURE — 84145 PROCALCITONIN (PCT): CPT

## 2025-05-18 PROCEDURE — 87186 SC STD MICRODIL/AGAR DIL: CPT

## 2025-05-18 PROCEDURE — 74176 CT ABD & PELVIS W/O CONTRAST: CPT

## 2025-05-18 PROCEDURE — 700111 HCHG RX REV CODE 636 W/ 250 OVERRIDE (IP): Mod: JZ | Performed by: ANESTHESIOLOGY

## 2025-05-18 PROCEDURE — 160035 HCHG PACU - 1ST 60 MINS PHASE I: Performed by: STUDENT IN AN ORGANIZED HEALTH CARE EDUCATION/TRAINING PROGRAM

## 2025-05-18 PROCEDURE — C1769 GUIDE WIRE: HCPCS | Performed by: STUDENT IN AN ORGANIZED HEALTH CARE EDUCATION/TRAINING PROGRAM

## 2025-05-18 PROCEDURE — 99223 1ST HOSP IP/OBS HIGH 75: CPT | Mod: AI | Performed by: HOSPITALIST

## 2025-05-18 PROCEDURE — 700105 HCHG RX REV CODE 258: Performed by: HOSPITALIST

## 2025-05-18 PROCEDURE — 160009 HCHG ANES TIME/MIN: Performed by: STUDENT IN AN ORGANIZED HEALTH CARE EDUCATION/TRAINING PROGRAM

## 2025-05-18 PROCEDURE — 96374 THER/PROPH/DIAG INJ IV PUSH: CPT

## 2025-05-18 PROCEDURE — 83735 ASSAY OF MAGNESIUM: CPT

## 2025-05-18 PROCEDURE — 160015 HCHG STAT PREOP MINUTES: Performed by: STUDENT IN AN ORGANIZED HEALTH CARE EDUCATION/TRAINING PROGRAM

## 2025-05-18 PROCEDURE — 0T768DZ DILATION OF RIGHT URETER WITH INTRALUMINAL DEVICE, VIA NATURAL OR ARTIFICIAL OPENING ENDOSCOPIC: ICD-10-PCS | Performed by: STUDENT IN AN ORGANIZED HEALTH CARE EDUCATION/TRAINING PROGRAM

## 2025-05-18 PROCEDURE — 96375 TX/PRO/DX INJ NEW DRUG ADDON: CPT

## 2025-05-18 PROCEDURE — 80053 COMPREHEN METABOLIC PANEL: CPT

## 2025-05-18 PROCEDURE — 700111 HCHG RX REV CODE 636 W/ 250 OVERRIDE (IP): Mod: JZ | Performed by: EMERGENCY MEDICINE

## 2025-05-18 PROCEDURE — 700105 HCHG RX REV CODE 258: Performed by: ANESTHESIOLOGY

## 2025-05-18 PROCEDURE — 160002 HCHG RECOVERY MINUTES (STAT): Performed by: STUDENT IN AN ORGANIZED HEALTH CARE EDUCATION/TRAINING PROGRAM

## 2025-05-18 PROCEDURE — 36415 COLL VENOUS BLD VENIPUNCTURE: CPT

## 2025-05-18 PROCEDURE — 85025 COMPLETE CBC W/AUTO DIFF WBC: CPT

## 2025-05-18 PROCEDURE — 93005 ELECTROCARDIOGRAM TRACING: CPT | Mod: TC

## 2025-05-18 PROCEDURE — 52332 CYSTOSCOPY AND TREATMENT: CPT | Performed by: STUDENT IN AN ORGANIZED HEALTH CARE EDUCATION/TRAINING PROGRAM

## 2025-05-18 PROCEDURE — 770006 HCHG ROOM/CARE - MED/SURG/GYN SEMI*

## 2025-05-18 PROCEDURE — 83690 ASSAY OF LIPASE: CPT

## 2025-05-18 PROCEDURE — 160048 HCHG OR STATISTICAL LEVEL 1-5: Performed by: STUDENT IN AN ORGANIZED HEALTH CARE EDUCATION/TRAINING PROGRAM

## 2025-05-18 PROCEDURE — 87077 CULTURE AEROBIC IDENTIFY: CPT

## 2025-05-18 PROCEDURE — 700105 HCHG RX REV CODE 258: Performed by: EMERGENCY MEDICINE

## 2025-05-18 PROCEDURE — 160028 HCHG SURGERY MINUTES - 1ST 30 MINS LEVEL 3: Performed by: STUDENT IN AN ORGANIZED HEALTH CARE EDUCATION/TRAINING PROGRAM

## 2025-05-18 PROCEDURE — 99285 EMERGENCY DEPT VISIT HI MDM: CPT

## 2025-05-18 PROCEDURE — 82550 ASSAY OF CK (CPK): CPT

## 2025-05-18 PROCEDURE — 700111 HCHG RX REV CODE 636 W/ 250 OVERRIDE (IP): Performed by: ANESTHESIOLOGY

## 2025-05-18 PROCEDURE — 74018 RADEX ABDOMEN 1 VIEW: CPT

## 2025-05-18 PROCEDURE — 94760 N-INVAS EAR/PLS OXIMETRY 1: CPT

## 2025-05-18 PROCEDURE — 87086 URINE CULTURE/COLONY COUNT: CPT

## 2025-05-18 PROCEDURE — 81001 URINALYSIS AUTO W/SCOPE: CPT

## 2025-05-18 PROCEDURE — 700101 HCHG RX REV CODE 250: Performed by: ANESTHESIOLOGY

## 2025-05-18 PROCEDURE — C2617 STENT, NON-COR, TEM W/O DEL: HCPCS | Performed by: STUDENT IN AN ORGANIZED HEALTH CARE EDUCATION/TRAINING PROGRAM

## 2025-05-18 PROCEDURE — 93005 ELECTROCARDIOGRAM TRACING: CPT | Mod: TC | Performed by: EMERGENCY MEDICINE

## 2025-05-18 PROCEDURE — 96376 TX/PRO/DX INJ SAME DRUG ADON: CPT

## 2025-05-18 PROCEDURE — 700111 HCHG RX REV CODE 636 W/ 250 OVERRIDE (IP): Mod: JZ | Performed by: HOSPITALIST

## 2025-05-18 DEVICE — STENT UROLOGICAL POLARIS 6X28 ULTRA: Type: IMPLANTABLE DEVICE | Site: URETER | Status: FUNCTIONAL

## 2025-05-18 RX ORDER — HYDROMORPHONE HYDROCHLORIDE 1 MG/ML
0.5 INJECTION, SOLUTION INTRAMUSCULAR; INTRAVENOUS; SUBCUTANEOUS
Status: DISCONTINUED | OUTPATIENT
Start: 2025-05-18 | End: 2025-05-19

## 2025-05-18 RX ORDER — ONDANSETRON 2 MG/ML
INJECTION INTRAMUSCULAR; INTRAVENOUS PRN
Status: DISCONTINUED | OUTPATIENT
Start: 2025-05-18 | End: 2025-05-18 | Stop reason: SURG

## 2025-05-18 RX ORDER — OXYCODONE HYDROCHLORIDE 10 MG/1
10 TABLET ORAL
Refills: 0 | Status: DISCONTINUED | OUTPATIENT
Start: 2025-05-18 | End: 2025-05-19

## 2025-05-18 RX ORDER — ACETAMINOPHEN 325 MG/1
650 TABLET ORAL EVERY 6 HOURS PRN
Status: DISCONTINUED | OUTPATIENT
Start: 2025-05-18 | End: 2025-05-19 | Stop reason: HOSPADM

## 2025-05-18 RX ORDER — HYDROMORPHONE HYDROCHLORIDE 1 MG/ML
1 INJECTION, SOLUTION INTRAMUSCULAR; INTRAVENOUS; SUBCUTANEOUS ONCE
Status: COMPLETED | OUTPATIENT
Start: 2025-05-18 | End: 2025-05-18

## 2025-05-18 RX ORDER — OXYCODONE HCL 5 MG/5 ML
10 SOLUTION, ORAL ORAL
Status: DISCONTINUED | OUTPATIENT
Start: 2025-05-18 | End: 2025-05-18 | Stop reason: HOSPADM

## 2025-05-18 RX ORDER — HYDROMORPHONE HYDROCHLORIDE 1 MG/ML
0.5 INJECTION, SOLUTION INTRAMUSCULAR; INTRAVENOUS; SUBCUTANEOUS ONCE
Status: COMPLETED | OUTPATIENT
Start: 2025-05-18 | End: 2025-05-18

## 2025-05-18 RX ORDER — ONDANSETRON 2 MG/ML
4 INJECTION INTRAMUSCULAR; INTRAVENOUS
Status: DISCONTINUED | OUTPATIENT
Start: 2025-05-18 | End: 2025-05-18 | Stop reason: HOSPADM

## 2025-05-18 RX ORDER — METOPROLOL TARTRATE 50 MG
50 TABLET ORAL 2 TIMES DAILY
Status: DISCONTINUED | OUTPATIENT
Start: 2025-05-18 | End: 2025-05-19 | Stop reason: HOSPADM

## 2025-05-18 RX ORDER — CEFTRIAXONE 2 G/1
2000 INJECTION, POWDER, FOR SOLUTION INTRAMUSCULAR; INTRAVENOUS ONCE
Status: COMPLETED | OUTPATIENT
Start: 2025-05-18 | End: 2025-05-18

## 2025-05-18 RX ORDER — LEVALBUTEROL INHALATION SOLUTION 0.63 MG/3ML
3 SOLUTION RESPIRATORY (INHALATION) EVERY 4 HOURS PRN
Status: DISCONTINUED | OUTPATIENT
Start: 2025-05-18 | End: 2025-05-19 | Stop reason: HOSPADM

## 2025-05-18 RX ORDER — ONDANSETRON 2 MG/ML
4 INJECTION INTRAMUSCULAR; INTRAVENOUS ONCE
Status: COMPLETED | OUTPATIENT
Start: 2025-05-18 | End: 2025-05-18

## 2025-05-18 RX ORDER — SODIUM CHLORIDE 9 MG/ML
INJECTION, SOLUTION INTRAVENOUS CONTINUOUS
Status: DISCONTINUED | OUTPATIENT
Start: 2025-05-18 | End: 2025-05-19 | Stop reason: HOSPADM

## 2025-05-18 RX ORDER — SODIUM CHLORIDE, SODIUM LACTATE, POTASSIUM CHLORIDE, CALCIUM CHLORIDE 600; 310; 30; 20 MG/100ML; MG/100ML; MG/100ML; MG/100ML
INJECTION, SOLUTION INTRAVENOUS CONTINUOUS
Status: DISCONTINUED | OUTPATIENT
Start: 2025-05-18 | End: 2025-05-18

## 2025-05-18 RX ORDER — LIDOCAINE HYDROCHLORIDE 20 MG/ML
INJECTION, SOLUTION EPIDURAL; INFILTRATION; INTRACAUDAL; PERINEURAL PRN
Status: DISCONTINUED | OUTPATIENT
Start: 2025-05-18 | End: 2025-05-18 | Stop reason: SURG

## 2025-05-18 RX ORDER — HYDROMORPHONE HYDROCHLORIDE 1 MG/ML
0.1 INJECTION, SOLUTION INTRAMUSCULAR; INTRAVENOUS; SUBCUTANEOUS
Status: DISCONTINUED | OUTPATIENT
Start: 2025-05-18 | End: 2025-05-18 | Stop reason: HOSPADM

## 2025-05-18 RX ORDER — METOCLOPRAMIDE HYDROCHLORIDE 5 MG/ML
10 INJECTION INTRAMUSCULAR; INTRAVENOUS ONCE
Status: COMPLETED | OUTPATIENT
Start: 2025-05-18 | End: 2025-05-18

## 2025-05-18 RX ORDER — SODIUM CHLORIDE, SODIUM LACTATE, POTASSIUM CHLORIDE, CALCIUM CHLORIDE 600; 310; 30; 20 MG/100ML; MG/100ML; MG/100ML; MG/100ML
INJECTION, SOLUTION INTRAVENOUS CONTINUOUS
Status: DISCONTINUED | OUTPATIENT
Start: 2025-05-18 | End: 2025-05-18 | Stop reason: HOSPADM

## 2025-05-18 RX ORDER — HYDROMORPHONE HYDROCHLORIDE 1 MG/ML
0.2 INJECTION, SOLUTION INTRAMUSCULAR; INTRAVENOUS; SUBCUTANEOUS
Status: DISCONTINUED | OUTPATIENT
Start: 2025-05-18 | End: 2025-05-18 | Stop reason: HOSPADM

## 2025-05-18 RX ORDER — DIAZEPAM 5 MG/1
5 TABLET ORAL EVERY 6 HOURS PRN
Status: DISCONTINUED | OUTPATIENT
Start: 2025-05-18 | End: 2025-05-19 | Stop reason: HOSPADM

## 2025-05-18 RX ORDER — HALOPERIDOL 5 MG/ML
1 INJECTION INTRAMUSCULAR
Status: DISCONTINUED | OUTPATIENT
Start: 2025-05-18 | End: 2025-05-18 | Stop reason: HOSPADM

## 2025-05-18 RX ORDER — HYDROMORPHONE HYDROCHLORIDE 1 MG/ML
0.4 INJECTION, SOLUTION INTRAMUSCULAR; INTRAVENOUS; SUBCUTANEOUS
Status: DISCONTINUED | OUTPATIENT
Start: 2025-05-18 | End: 2025-05-18 | Stop reason: HOSPADM

## 2025-05-18 RX ORDER — LABETALOL HYDROCHLORIDE 5 MG/ML
10 INJECTION, SOLUTION INTRAVENOUS EVERY 4 HOURS PRN
Status: DISCONTINUED | OUTPATIENT
Start: 2025-05-18 | End: 2025-05-19 | Stop reason: HOSPADM

## 2025-05-18 RX ORDER — SODIUM CHLORIDE 9 MG/ML
1000 INJECTION, SOLUTION INTRAVENOUS ONCE
Status: COMPLETED | OUTPATIENT
Start: 2025-05-18 | End: 2025-05-18

## 2025-05-18 RX ORDER — SODIUM CHLORIDE, SODIUM LACTATE, POTASSIUM CHLORIDE, CALCIUM CHLORIDE 600; 310; 30; 20 MG/100ML; MG/100ML; MG/100ML; MG/100ML
INJECTION, SOLUTION INTRAVENOUS
Status: DISCONTINUED | OUTPATIENT
Start: 2025-05-18 | End: 2025-05-18 | Stop reason: SURG

## 2025-05-18 RX ORDER — HYDROXYCHLOROQUINE SULFATE 200 MG/1
200 TABLET, FILM COATED ORAL DAILY
Status: DISCONTINUED | OUTPATIENT
Start: 2025-05-19 | End: 2025-05-19 | Stop reason: HOSPADM

## 2025-05-18 RX ORDER — DEXAMETHASONE SODIUM PHOSPHATE 4 MG/ML
INJECTION, SOLUTION INTRA-ARTICULAR; INTRALESIONAL; INTRAMUSCULAR; INTRAVENOUS; SOFT TISSUE PRN
Status: DISCONTINUED | OUTPATIENT
Start: 2025-05-18 | End: 2025-05-18 | Stop reason: SURG

## 2025-05-18 RX ORDER — AMOXICILLIN 250 MG
2 CAPSULE ORAL EVERY EVENING
Status: DISCONTINUED | OUTPATIENT
Start: 2025-05-18 | End: 2025-05-19 | Stop reason: HOSPADM

## 2025-05-18 RX ORDER — PHENYLEPHRINE HCL IN 0.9% NACL 1 MG/10 ML
SYRINGE (ML) INTRAVENOUS PRN
Status: DISCONTINUED | OUTPATIENT
Start: 2025-05-18 | End: 2025-05-18 | Stop reason: SURG

## 2025-05-18 RX ORDER — OXYCODONE HYDROCHLORIDE 5 MG/1
5 TABLET ORAL
Refills: 0 | Status: DISCONTINUED | OUTPATIENT
Start: 2025-05-18 | End: 2025-05-19

## 2025-05-18 RX ORDER — ONDANSETRON 4 MG/1
4 TABLET, ORALLY DISINTEGRATING ORAL EVERY 4 HOURS PRN
Status: DISCONTINUED | OUTPATIENT
Start: 2025-05-18 | End: 2025-05-19 | Stop reason: HOSPADM

## 2025-05-18 RX ORDER — ONDANSETRON 2 MG/ML
4 INJECTION INTRAMUSCULAR; INTRAVENOUS EVERY 4 HOURS PRN
Status: DISCONTINUED | OUTPATIENT
Start: 2025-05-18 | End: 2025-05-19 | Stop reason: HOSPADM

## 2025-05-18 RX ORDER — OXYCODONE HCL 5 MG/5 ML
5 SOLUTION, ORAL ORAL
Status: DISCONTINUED | OUTPATIENT
Start: 2025-05-18 | End: 2025-05-18 | Stop reason: HOSPADM

## 2025-05-18 RX ORDER — POLYETHYLENE GLYCOL 3350 17 G/17G
1 POWDER, FOR SOLUTION ORAL
Status: DISCONTINUED | OUTPATIENT
Start: 2025-05-18 | End: 2025-05-19 | Stop reason: HOSPADM

## 2025-05-18 RX ADMIN — HYDROMORPHONE HYDROCHLORIDE 0.2 MG: 1 INJECTION, SOLUTION INTRAMUSCULAR; INTRAVENOUS; SUBCUTANEOUS at 19:28

## 2025-05-18 RX ADMIN — SODIUM CHLORIDE: 9 INJECTION, SOLUTION INTRAVENOUS at 23:11

## 2025-05-18 RX ADMIN — ONDANSETRON 4 MG: 2 INJECTION INTRAMUSCULAR; INTRAVENOUS at 17:03

## 2025-05-18 RX ADMIN — HYDROMORPHONE HYDROCHLORIDE 0.4 MG: 1 INJECTION, SOLUTION INTRAMUSCULAR; INTRAVENOUS; SUBCUTANEOUS at 19:13

## 2025-05-18 RX ADMIN — ONDANSETRON 4 MG: 2 INJECTION INTRAMUSCULAR; INTRAVENOUS at 12:27

## 2025-05-18 RX ADMIN — HYDROMORPHONE HYDROCHLORIDE 1 MG: 1 INJECTION, SOLUTION INTRAMUSCULAR; INTRAVENOUS; SUBCUTANEOUS at 13:50

## 2025-05-18 RX ADMIN — DEXAMETHASONE SODIUM PHOSPHATE 8 MG: 4 INJECTION INTRA-ARTICULAR; INTRALESIONAL; INTRAMUSCULAR; INTRAVENOUS; SOFT TISSUE at 18:21

## 2025-05-18 RX ADMIN — HYDROMORPHONE HYDROCHLORIDE 0.5 MG: 1 INJECTION, SOLUTION INTRAMUSCULAR; INTRAVENOUS; SUBCUTANEOUS at 14:49

## 2025-05-18 RX ADMIN — HYDROMORPHONE HYDROCHLORIDE 0.5 MG: 1 INJECTION, SOLUTION INTRAMUSCULAR; INTRAVENOUS; SUBCUTANEOUS at 12:27

## 2025-05-18 RX ADMIN — METOCLOPRAMIDE 10 MG: 5 INJECTION, SOLUTION INTRAMUSCULAR; INTRAVENOUS at 13:50

## 2025-05-18 RX ADMIN — Medication 100 MCG: at 18:34

## 2025-05-18 RX ADMIN — ONDANSETRON 4 MG: 2 INJECTION INTRAMUSCULAR; INTRAVENOUS at 22:29

## 2025-05-18 RX ADMIN — HYDROMORPHONE HYDROCHLORIDE 0.5 MG: 1 INJECTION, SOLUTION INTRAMUSCULAR; INTRAVENOUS; SUBCUTANEOUS at 23:32

## 2025-05-18 RX ADMIN — SODIUM CHLORIDE: 9 INJECTION, SOLUTION INTRAVENOUS at 17:09

## 2025-05-18 RX ADMIN — LIDOCAINE HYDROCHLORIDE 100 MG: 20 INJECTION, SOLUTION EPIDURAL; INFILTRATION; INTRACAUDAL; PERINEURAL at 18:21

## 2025-05-18 RX ADMIN — ONDANSETRON 4 MG: 2 INJECTION INTRAMUSCULAR; INTRAVENOUS at 18:34

## 2025-05-18 RX ADMIN — CEFTRIAXONE SODIUM 2000 MG: 2 INJECTION, POWDER, FOR SOLUTION INTRAMUSCULAR; INTRAVENOUS at 12:43

## 2025-05-18 RX ADMIN — HYDROMORPHONE HYDROCHLORIDE 0.4 MG: 1 INJECTION, SOLUTION INTRAMUSCULAR; INTRAVENOUS; SUBCUTANEOUS at 19:04

## 2025-05-18 RX ADMIN — HYDROMORPHONE HYDROCHLORIDE 0.5 MG: 1 INJECTION, SOLUTION INTRAMUSCULAR; INTRAVENOUS; SUBCUTANEOUS at 17:02

## 2025-05-18 RX ADMIN — HYDROMORPHONE HYDROCHLORIDE 0.5 MG: 1 INJECTION, SOLUTION INTRAMUSCULAR; INTRAVENOUS; SUBCUTANEOUS at 22:28

## 2025-05-18 RX ADMIN — SODIUM CHLORIDE 1000 ML: 9 INJECTION, SOLUTION INTRAVENOUS at 14:49

## 2025-05-18 RX ADMIN — PROPOFOL 150 MG: 10 INJECTION, EMULSION INTRAVENOUS at 18:21

## 2025-05-18 RX ADMIN — SODIUM CHLORIDE, POTASSIUM CHLORIDE, SODIUM LACTATE AND CALCIUM CHLORIDE: 600; 310; 30; 20 INJECTION, SOLUTION INTRAVENOUS at 18:17

## 2025-05-18 ASSESSMENT — COPD QUESTIONNAIRES
HAVE YOU SMOKED AT LEAST 100 CIGARETTES IN YOUR ENTIRE LIFE: NO/DON'T KNOW
DURING THE PAST 4 WEEKS HOW MUCH DID YOU FEEL SHORT OF BREATH: NONE/LITTLE OF THE TIME
DO YOU EVER COUGH UP ANY MUCUS OR PHLEGM?: NO/ONLY WITH OCCASIONAL COLDS OR INFECTIONS
COPD SCREENING SCORE: 2

## 2025-05-18 ASSESSMENT — ENCOUNTER SYMPTOMS
ORTHOPNEA: 0
STRIDOR: 0
WHEEZING: 0
HEARTBURN: 0
BLOOD IN STOOL: 0
POLYDIPSIA: 0
CHILLS: 1
BACK PAIN: 0
PSYCHIATRIC NEGATIVE: 1
EYES NEGATIVE: 1
CARDIOVASCULAR NEGATIVE: 1
RESPIRATORY NEGATIVE: 1
FALLS: 0
INSOMNIA: 0
ABDOMINAL PAIN: 1
MYALGIAS: 0
EYE DISCHARGE: 0
SEIZURES: 0
DEPRESSION: 0
COUGH: 0
DOUBLE VISION: 0
PND: 0
PHOTOPHOBIA: 0
FEVER: 1
DIZZINESS: 0
TINGLING: 0
SINUS PAIN: 0
NEUROLOGICAL NEGATIVE: 1
SHORTNESS OF BREATH: 0
SENSORY CHANGE: 0
NAUSEA: 1
FLANK PAIN: 1
SPUTUM PRODUCTION: 0
BRUISES/BLEEDS EASILY: 0
DIAPHORESIS: 0
EYE REDNESS: 0
FOCAL WEAKNESS: 0

## 2025-05-18 ASSESSMENT — LIFESTYLE VARIABLES
HAVE PEOPLE ANNOYED YOU BY CRITICIZING YOUR DRINKING: NO
TOTAL SCORE: 0
TOTAL SCORE: 0
SUBSTANCE_ABUSE: 0
EVER FELT BAD OR GUILTY ABOUT YOUR DRINKING: NO
HAVE YOU EVER FELT YOU SHOULD CUT DOWN ON YOUR DRINKING: NO
HOW MANY TIMES IN THE PAST YEAR HAVE YOU HAD 5 OR MORE DRINKS IN A DAY: 0
TOTAL SCORE: 0
DOES PATIENT WANT TO STOP DRINKING: NO
ALCOHOL_USE: YES
ON A TYPICAL DAY WHEN YOU DRINK ALCOHOL HOW MANY DRINKS DO YOU HAVE: 1
EVER HAD A DRINK FIRST THING IN THE MORNING TO STEADY YOUR NERVES TO GET RID OF A HANGOVER: NO
CONSUMPTION TOTAL: NEGATIVE
AVERAGE NUMBER OF DAYS PER WEEK YOU HAVE A DRINK CONTAINING ALCOHOL: 1

## 2025-05-18 ASSESSMENT — SOCIAL DETERMINANTS OF HEALTH (SDOH)
WITHIN THE LAST YEAR, HAVE YOU BEEN AFRAID OF YOUR PARTNER OR EX-PARTNER?: NO
WITHIN THE LAST YEAR, HAVE YOU BEEN HUMILIATED OR EMOTIONALLY ABUSED IN OTHER WAYS BY YOUR PARTNER OR EX-PARTNER?: NO
WITHIN THE PAST 12 MONTHS, THE FOOD YOU BOUGHT JUST DIDN'T LAST AND YOU DIDN'T HAVE MONEY TO GET MORE: NEVER TRUE
IN THE PAST 12 MONTHS, HAS THE ELECTRIC, GAS, OIL, OR WATER COMPANY THREATENED TO SHUT OFF SERVICE IN YOUR HOME?: NO
WITHIN THE LAST YEAR, HAVE YOU BEEN KICKED, HIT, SLAPPED, OR OTHERWISE PHYSICALLY HURT BY YOUR PARTNER OR EX-PARTNER?: NO
WITHIN THE PAST 12 MONTHS, YOU WORRIED THAT YOUR FOOD WOULD RUN OUT BEFORE YOU GOT THE MONEY TO BUY MORE: NEVER TRUE
WITHIN THE LAST YEAR, HAVE TO BEEN RAPED OR FORCED TO HAVE ANY KIND OF SEXUAL ACTIVITY BY YOUR PARTNER OR EX-PARTNER?: NO

## 2025-05-18 ASSESSMENT — FIBROSIS 4 INDEX
FIB4 SCORE: 1.98
FIB4 SCORE: 2.55

## 2025-05-18 ASSESSMENT — PAIN DESCRIPTION - PAIN TYPE
TYPE: ACUTE PAIN
TYPE: SURGICAL PAIN
TYPE: SURGICAL PAIN
TYPE: ACUTE PAIN
TYPE: ACUTE PAIN
TYPE: SURGICAL PAIN
TYPE: ACUTE PAIN

## 2025-05-18 ASSESSMENT — VISUAL ACUITY: OU: 1

## 2025-05-18 NOTE — ASSESSMENT & PLAN NOTE
And Achalasia with chronic dysphagia  Swallowing difficulties postoperative, using liquid oxycodone for now, will give GI cocktail and Valium as patient had panic attack after having esophageal spasm  continue PPI therapy with omeprazole

## 2025-05-18 NOTE — ED PROVIDER NOTES
"ED Provider Note    CHIEF COMPLAINT  Chief Complaint   Patient presents with    RLQ Pain     That radiates into the back, pt has hx of kidney stones   Pt states that she had a procedure on Friday \"an injection into my spinal\"     Irregular Heart Beat     Pt has history of Afib but is normally SR  Pt states that yesterday she was in an out of Afib but that it seems to have resolve         EXTERNAL RECORDS REVIEWED  Outpatient Notes seen in her primary care provider's office on 4/20/2025.  She has a history of achalasia, Crohn's disease of the small intestine, GERD, and low back pain.  She is dronabinol for abnormal weight loss and has been referred to [facility] for achalasia.  She is also on Roxicodone for Crohn's as well as prednisone.    HPI/ROS  LIMITATION TO HISTORY   Select: : None  OUTSIDE HISTORIAN(S):  None    Jana Overton is a 69 y.o. female who presents with a chief complaint of right sided flank pain radiating into the right side of her abdomen that started this morning.  She has tried oxycodone that is prescribed for history of Crohn's disease and low back pain which has been unhelpful.  She has associated nonbloody, nonbilious emesis which has been unresponsive to Zofran.  She feels very dehydrated.  She notes that her urine was cloudy this morning but as the day progressed it began to turn dark brown.  She had a myelogram on Friday with Dr. Monahan and is awaiting these results.  She has no dysuria.  She denies any fevers or chills, chest pain or shortness of breath but notes that she felt as though she went into atrial fibrillation several times yesterday. She has a history of atrial fibrillation and is not anticoagulated due to her history of Crohn's.  She also has a colostomy in place due to her history of Crohn's disease.    PAST MEDICAL HISTORY   has a past medical history of Anesthesia, Anxiety, Arthritis, ASTHMA, Atrial fib/flut, Backpain, Bronchitis, Chronic pain, Colostomy in place (HCC), COPD " (chronic obstructive pulmonary disease) (HCC), Crohn's disease of colon (HCC), Dyspnea, History of cardiac murmur, Ileostomy present (HCC), Infectious disease, Multiple falls, Narcotic dependence (HCC), Obstruction, Pain, Pneumonia, Postherpetic neuralgia, Rosacea, and Sleep apnea.    SURGICAL HISTORY   has a past surgical history that includes lumbar fusion anterior; cervical disk and fusion anterior; foot surgery; hand surgery; other abdominal surgery; gastroscopy with biopsy (11/22/08); ileostomy (11/11/2009); dilation and curettage (9/24/2010); gastroscopy (10/4/2011); colonoscopy (10/4/2011); hardware removal neuro (7/16/2012); mammoplasty reduction (7/17/2013); ileostomy (5/14/2014); breast reduction; abdominal exploration; lumpectomy; colon resection; ileostomy (12/29/2018); sigmoidoscopy flex (12/29/2018); exploratory laparotomy (12/29/2018); gastroscopy (1/6/2019); gastroscopy (N/A, 5/22/2019); ileostomy (1/20/2021); lysis adhesions general (1/20/2021); cysto/uretero/pyeloscopy, dx (Right, 12/8/2021); cystoscopy with ureteral stent insertion or removal (Right, 12/8/2021); cystoscopy,insert ureteral stent (Right, 12/23/2021); cysto/uretero/pyeloscopy, dx (Right, 12/23/2021); upper gi endoscopy,diagnosis (N/A, 10/24/2022); biliary dilatation (N/A, 10/24/2022); upper gi endoscopy,diagnosis (N/A, 1/16/2023); upper gi endoscopy,w/dilat,gastric out (N/A, 1/16/2023); upper gi endoscopy,biopsy (N/A, 1/16/2023); upper gi endoscopy,diagnosis (N/A, 2/8/2023); upper gi endoscopy,w/dilat,gastric out (N/A, 2/8/2023); upper gi endoscopy,biopsy (N/A, 2/8/2023); and upper gi endoscopy,diagnosis (N/A, 12/7/2024).    FAMILY HISTORY  Family History   Problem Relation Age of Onset    Heart Disease Mother         Angina, MI later in life    Lung Disease Mother         copd    Diabetes Father     Heart Disease Father     Diabetes Sister     Cancer Maternal Aunt 40        breast       SOCIAL HISTORY  Social History     Tobacco Use     Smoking status: Never     Passive exposure: Never    Smokeless tobacco: Never    Tobacco comments:     second hand smoke parents - smoked for only 1 year many years ago   Vaping Use    Vaping status: Former    Substances: THC   Substance and Sexual Activity    Alcohol use: Not Currently     Alcohol/week: 0.6 oz     Types: 1 Shots of liquor per week     Comment: gin and half a lime; tonic water    Drug use: Not Currently     Types: Marijuana, Inhaled     Comment: medical marijuana through bong/vape    Sexual activity: Not on file     Comment:        CURRENT MEDICATIONS  Home Medications    **Home medications have not yet been reviewed for this encounter**         ALLERGIES  Allergies[1]    PHYSICAL EXAM  VITAL SIGNS: /79   Pulse 82   Temp 37.3 °C (99.1 °F) (Temporal)   Resp 20   Ht 1.829 m (6')   Wt 60.1 kg (132 lb 7.9 oz)   LMP  (LMP Unknown)   SpO2 94%   BMI 17.97 kg/m²    Physical Exam  Vitals and nursing note reviewed.   Constitutional:       Appearance: Normal appearance.   HENT:      Head: Normocephalic and atraumatic.      Right Ear: External ear normal.      Left Ear: External ear normal.      Nose: Nose normal.      Mouth/Throat:      Mouth: Mucous membranes are dry.      Pharynx: Oropharynx is clear.   Eyes:      Extraocular Movements: Extraocular movements intact.      Conjunctiva/sclera: Conjunctivae normal.      Pupils: Pupils are equal, round, and reactive to light.   Cardiovascular:      Rate and Rhythm: Normal rate and regular rhythm.   Pulmonary:      Effort: Pulmonary effort is normal.      Breath sounds: Normal breath sounds.   Abdominal:      Palpations: Abdomen is soft.      Tenderness: There is abdominal tenderness. There is right CVA tenderness. There is no left CVA tenderness.      Comments: Right sided abdominal tenderness.  No rigidity or guarding.  Colostomy in place.   Musculoskeletal:         General: Normal range of motion.      Cervical back: Normal range of  motion and neck supple.   Skin:     General: Skin is warm and dry.   Neurological:      General: No focal deficit present.      Mental Status: She is alert and oriented to person, place, and time.   Psychiatric:         Mood and Affect: Mood normal.         Behavior: Behavior normal.       EKG/LABS  Results for orders placed or performed during the hospital encounter of 05/18/25   Urinalysis    Collection Time: 05/18/25 11:18 AM    Specimen: Urine   Result Value Ref Range    Color Brown (A)     Character Turbid (A)     Specific Gravity >=1.030 <1.035    Ph 6.0 5.0 - 8.0    Glucose Negative Negative mg/dL    Ketones Negative Negative mg/dL    Protein >=300 (A) Negative mg/dL    Bilirubin Negative Negative    Urobilinogen, Urine 0.2 <=1.0 EU/dL    Nitrite Positive (A) Negative    Leukocyte Esterase Trace (A) Negative    Occult Blood Large (A) Negative    Micro Urine Req Microscopic    URINE MICROSCOPIC (W/UA)    Collection Time: 05/18/25 11:18 AM   Result Value Ref Range    WBC 11-20 (A) /hpf    RBC >100 (A) /hpf    Bacteria Many (A) None /hpf    Epithelial Cells 0-2 0 - 5 /hpf    Mucous Threads Present /hpf    Urine Casts 0-2 0 - 2 /lpf   CBC with Differential    Collection Time: 05/18/25 11:34 AM   Result Value Ref Range    WBC 10.7 4.8 - 10.8 K/uL    RBC 4.67 4.20 - 5.40 M/uL    Hemoglobin 14.2 12.0 - 16.0 g/dL    Hematocrit 43.8 37.0 - 47.0 %    MCV 93.8 81.4 - 97.8 fL    MCH 30.4 27.0 - 33.0 pg    MCHC 32.4 32.2 - 35.5 g/dL    RDW 44.0 35.9 - 50.0 fL    Platelet Count 242 164 - 446 K/uL    MPV 10.3 9.0 - 12.9 fL    Neutrophils-Polys 80.80 (H) 44.00 - 72.00 %    Lymphocytes 10.40 (L) 22.00 - 41.00 %    Monocytes 7.20 0.00 - 13.40 %    Eosinophils 0.40 0.00 - 6.90 %    Basophils 0.60 0.00 - 1.80 %    Immature Granulocytes 0.60 0.00 - 0.90 %    Nucleated RBC 0.00 0.00 - 0.20 /100 WBC    Neutrophils (Absolute) 8.64 (H) 1.82 - 7.42 K/uL    Lymphs (Absolute) 1.11 1.00 - 4.80 K/uL    Monos (Absolute) 0.77 0.00 - 0.85  K/uL    Eos (Absolute) 0.04 0.00 - 0.51 K/uL    Baso (Absolute) 0.06 0.00 - 0.12 K/uL    Immature Granulocytes (abs) 0.06 0.00 - 0.11 K/uL    NRBC (Absolute) 0.00 K/uL   Complete Metabolic Panel    Collection Time: 25 11:34 AM   Result Value Ref Range    Sodium 141 135 - 145 mmol/L    Potassium 4.3 3.6 - 5.5 mmol/L    Chloride 104 96 - 112 mmol/L    Co2 21 20 - 33 mmol/L    Anion Gap 16.0 7.0 - 16.0    Glucose 107 (H) 65 - 99 mg/dL    Bun 23 (H) 8 - 22 mg/dL    Creatinine 1.62 (H) 0.50 - 1.40 mg/dL    Calcium 10.2 8.5 - 10.5 mg/dL    Correct Calcium 9.6 8.5 - 10.5 mg/dL    AST(SGOT) 37 12 - 45 U/L    ALT(SGPT) 30 2 - 50 U/L    Alkaline Phosphatase 110 (H) 30 - 99 U/L    Total Bilirubin 0.5 0.1 - 1.5 mg/dL    Albumin 4.8 3.2 - 4.9 g/dL    Total Protein 8.0 6.0 - 8.2 g/dL    Globulin 3.2 1.9 - 3.5 g/dL    A-G Ratio 1.5 g/dL   Lipase    Collection Time: 25 11:34 AM   Result Value Ref Range    Lipase 32 11 - 82 U/L   ESTIMATED GFR    Collection Time: 25 11:34 AM   Result Value Ref Range    GFR (CKD-EPI) 34 (A) >60 mL/min/1.73 m 2   CREATINE KINASE    Collection Time: 25 11:34 AM   Result Value Ref Range    CPK Total 116 0 - 154 U/L   EKG    Collection Time: 25  2:42 PM   Result Value Ref Range    Report       [facility] Emergency Dept[facility]    Test Date:  2025  Pt Name:    YONATHAN JUDD                Department: [facility]  MRN:        [id number]                      Room:  Gender:     Female                       Technician: arben  :        [id number]                   Requested By:ER TRIAGE PROTOCOL  Order #:    [id number]                    Reading MD: Andrew Smith MD    Measurements  Intervals                                Axis  Rate:       91                           P:          82  NE:         188                          QRS:        82  QRSD:       105                          T:          52  QT:         365  QTc:        450    Interpretive Statements  Sinus  rhythm  LAE, consider biatrial enlargement  Borderline right axis deviation  RSR' in V1 or V2, probably normal variant  Minimal ST elevation, anterior leads  Artifact in lead(s) I,II,III,aVR,aVL,aVF  Compared to ECG 12/04/2024 10:09:44  RSR' in V1 or V2 now present  ST (T wave) deviation still present  Electro nically Signed On 05- 14:42:42 PDT by Andrew Smtih MD       *Note: Due to a large number of results and/or encounters for the requested time period, some results have not been displayed. A complete set of results can be found in Results Review.     I have independently interpreted this EKG    RADIOLOGY/PROCEDURES   I have independently interpreted the diagnostic imaging associated with this visit and am waiting the final reading from the radiologist.   My preliminary interpretation is as follows: Right mid ureteral stone.    Radiologist interpretation:  CT-RENAL COLIC EVALUATION(A/P W/O)   Final Result      1.  There is a 5 mm stone in the distal right ureter with moderate right-sided hydroureteronephrosis.   2.  Atherosclerosis.   3.  Status post colectomy with left abdominal ileostomy.        COURSE & MEDICAL DECISION MAKING    ASSESSMENT, COURSE AND PLAN  Care Narrative: This is a 69-year-old female with a history of kidney stones who is here with right-sided flank pain and dark urine.    Differential diagnosis includes, but is not limited to, pyelonephritis, ureterolithiasis, infected ureterolithiasis, hepatitis, cholecystitis, choledocholithiasis, symptomatic cholelithiasis, rhabdomyolysis.    Arrives afebrile with normal vital signs.  Appears dehydrated with dry mucous membranes but nontoxic.  Abdomen is soft with tenderness along the right side and she has right CVA tenderness.  She is not peritoneal.    Patient was started on IV fluids and given a dose of parenteral pain and nausea medication.    CBC is without leukocytosis or anemia.  She has a neutrophilic predominance but no bandemia.   Metabolic panel demonstrates FLORES with creatinine of 1.62 and GFR of 34.  Her BUN is elevated at 23.  She otherwise has normal electrolytes and LFTs.  Both lipase and CPK are normal.  Urinalysis is consistent with infection with many bacteria, nitrites, and leukocyte esterase.  She was started on ceftriaxone.    CT reveals a 5 mm distal right ureteral stone with moderate right-sided hydroureteronephrosis.  I spoke with Dr. Narayan, urology, and she concurs that the patient will require stenting.  Patient is n.p.o. in preparation for surgical management.  Dr. Narayan has requested hospitalist admission. I spoke with the hospitalist and she was admitted in guarded condition. Patient is aware of lab/imaging results and is comfortable with the plan of care.    Hydration: Based on the patient's presentation of Dehydration the patient was given IV fluids. IV Hydration was used because oral hydration was not adequate alone. Upon recheck following hydration, the patient was unchanged.    ADDITIONAL PROBLEMS MANAGED  None    DISPOSITION AND DISCUSSIONS  I have discussed management of the patient with the following physicians and LOTTIE's: Dr. Narayan, urology.    Discussion of management with other Q or appropriate source(s): None     Escalation of care considered, and ultimately not performed: N/A    Barriers to care at this time, including but not limited to: None.     Decision tools and prescription drugs considered including, but not limited to: N/A.    FINAL DIAGNOSIS  1.  Infected right ureteral stone  2.  FLORES     Electronically signed by: Andrew Smith M.D., 5/18/2025 11:48 AM           [1]   Allergies  Allergen Reactions    Amoxicillin Anaphylaxis    Demerol Hcl [Meperidine] Shortness of Breath and Palpitations    Doxycycline Rash     Throat also closes up     Methotrexate Hives, Shortness of Breath and Rash          Morphine Shortness of Breath and Palpitations    Promethazine Shortness of Breath and Rash          Remicade  "[Infliximab] Hives, Shortness of Breath and Rash          Sudafed [Pseudoephedrine] Shortness of Breath, Vomiting and Palpitations    Azathioprine Sodium Hives and Shortness of Breath     10/21/2022 - patient unable to verify allergy/reaction  Historical reaction listed: Hives, Shortness of Breath    Carafate [Sucralfate] Vomiting and Nausea     + Mouth Sores    Other Drug Unspecified     \"STEROIDS\" - REACTION NOT SPECIFIED    Sulfa Drugs Rash          Sulfasalazine Rash          Gabapentin Cough, Hives, Itching, Nausea, Palpitations, Photosensitivity, Rash, Runny Nose, Swelling, Unspecified and Vomiting    Nitroglycerin      Headache    Prednisone      Other Reaction(s): Reaction:GI system trouble is able to take orally    Amitriptyline Unspecified     Nightmares      Ativan [Lorazepam] Unspecified     Nightmares    Betamethasone Unspecified     10/21/2022 - patient unable to verify allergy/reaction  No historical reaction listed  Patient is able to tollerate prednesone 4/24/2025.     Fentanyl Unspecified     \"Patches didn't work\" and skin broke down    Lyrica [Pregabalin] Nausea          Methadone Unspecified     \"Didn't work\"    Potassium Unspecified     Notes: In IV only  REACTION NOT SPECIFIED    Tizanidine Unspecified     10/21/2022 - patient unable to verify allergy/reaction  No historical reaction listed     "

## 2025-05-18 NOTE — ED TRIAGE NOTES
"Chief Complaint   Patient presents with    RLQ Pain     That radiates into the back, pt has hx of kidney stones   Pt states that she had a procedure on Friday \"an injection into my spinal\"     Irregular Heart Beat     Pt has history of Afib but is normally SR  Pt states that yesterday she was in an out of Afib but that it seems to have resolve       /79   Pulse 82   Temp 37.3 °C (99.1 °F) (Temporal)   Resp 20   Ht 1.829 m (6')   Wt 60.1 kg (132 lb 7.9 oz)   LMP  (LMP Unknown)   SpO2 94%   BMI 17.97 kg/m²     "

## 2025-05-18 NOTE — H&P
"Hospital Medicine History & Physical Note    Date of Service  5/18/2025    Primary Care Physician  Chase Charles D.O.    Consultants  urology    Specialist Names: Dr Narayan    Code Status  Full Code    Chief Complaint  Chief Complaint   Patient presents with    RLQ Pain     That radiates into the back, pt has hx of kidney stones   Pt states that she had a procedure on Friday \"an injection into my spinal\"     Irregular Heart Beat     Pt has history of Afib but is normally SR  Pt states that yesterday she was in an out of Afib but that it seems to have resolve         History of Presenting Illness  Jana Overton is a 69 y.o. female who presented 5/18/2025 with sudden onset of right flank pain.  Patient tells me that yesterday she was having fevers and chills and some shaking.  She developed some mild pain in her flank but she attributed this to her Crohn's disease as she always has pain with that in spite of having a colectomy.  This morning pain became 10 out of 10 in her right lower quadrant of the abdomen as well as the right flank.  She comes in is found to have a 5 mm ureteral stone that is distal.  Urology has been consulted.  Patient will be kept NPO.  Patient will be going for cystoscopy and stone resection..    I discussed the plan of care with patient, bedside RN, and emergency room physician Dr. Andrew Smith.    Review of Systems  Review of Systems   Constitutional:  Positive for chills, fever and malaise/fatigue. Negative for diaphoresis.   HENT: Negative.  Negative for nosebleeds and sinus pain.    Eyes: Negative.  Negative for double vision, photophobia, discharge and redness.   Respiratory: Negative.  Negative for cough, sputum production, shortness of breath, wheezing and stridor.    Cardiovascular: Negative.  Negative for chest pain, orthopnea, leg swelling and PND.   Gastrointestinal:  Positive for abdominal pain and nausea. Negative for blood in stool and heartburn.   Genitourinary:  " Positive for flank pain and hematuria. Negative for dysuria and frequency.   Musculoskeletal:  Negative for back pain, falls and myalgias.   Skin: Negative.  Negative for itching.   Neurological: Negative.  Negative for dizziness, tingling, sensory change, focal weakness and seizures.   Endo/Heme/Allergies: Negative.  Negative for polydipsia. Does not bruise/bleed easily.   Psychiatric/Behavioral: Negative.  Negative for depression, substance abuse and suicidal ideas. The patient does not have insomnia.    All other systems reviewed and are negative.      Past Medical History   has a past medical history of Anesthesia, Anxiety, Arthritis, ASTHMA, Atrial fib/flut, Backpain, Bronchitis, Chronic pain, Colostomy in place ([facility]), COPD (chronic obstructive pulmonary disease) ([facility]), Crohn's disease of colon ([facility]), Dyspnea, History of cardiac murmur, Ileostomy present ([facility]), Infectious disease, Multiple falls, Narcotic dependence ([facility]), Obstruction, Pain, Pneumonia, Postherpetic neuralgia, Rosacea, and Sleep apnea.    Surgical History   has a past surgical history that includes lumbar fusion anterior; cervical disk and fusion anterior; foot surgery; hand surgery; other abdominal surgery; gastroscopy with biopsy (11/22/08); ileostomy (11/11/2009); dilation and curettage (9/24/2010); gastroscopy (10/4/2011); colonoscopy (10/4/2011); hardware removal neuro (7/16/2012); mammoplasty reduction (7/17/2013); ileostomy (5/14/2014); pr breast reduction; abdominal exploration; lumpectomy; colon resection; ileostomy (12/29/2018); sigmoidoscopy flex (12/29/2018); exploratory laparotomy (12/29/2018); gastroscopy (1/6/2019); gastroscopy (N/A, 5/22/2019); ileostomy (1/20/2021); lysis adhesions general (1/20/2021); pr cysto/uretero/pyeloscopy, dx (Right, 12/8/2021); cystoscopy with ureteral stent insertion or removal (Right, 12/8/2021); pr cystoscopy,insert ureteral stent (Right, 12/23/2021); pr cysto/uretero/pyeloscopy, dx (Right,  12/23/2021); pr upper gi endoscopy,diagnosis (N/A, 10/24/2022); biliary dilatation (N/A, 10/24/2022); pr upper gi endoscopy,diagnosis (N/A, 1/16/2023); pr upper gi endoscopy,w/dilat,gastric out (N/A, 1/16/2023); pr upper gi endoscopy,biopsy (N/A, 1/16/2023); pr upper gi endoscopy,diagnosis (N/A, 2/8/2023); pr upper gi endoscopy,w/dilat,gastric out (N/A, 2/8/2023); pr upper gi endoscopy,biopsy (N/A, 2/8/2023); and pr upper gi endoscopy,diagnosis (N/A, 12/7/2024).     Family History  family history includes Cancer (age of onset: 40) in her maternal aunt; Diabetes in her father and sister; Heart Disease in her father and mother; Lung Disease in her mother.   Family history reviewed with patient. There is no family history that is pertinent to the chief complaint.     Social History   reports that she has never smoked. She has never been exposed to tobacco smoke. She has never used smokeless tobacco. She reports that she does not currently use alcohol after a past usage of about 0.6 oz of alcohol per week. She reports that she does not currently use drugs after having used the following drugs: Marijuana and Inhaled.    Allergies  Allergies[1]    Medications  Prior to Admission Medications   Prescriptions Last Dose Informant Patient Reported? Taking?   Ascorbic Acid (VITAMIN C GUMMIE PO) 5/17/2025 Evening Patient Yes Yes   Sig: Take 2 Tablets by mouth every day.   Azelaic Acid 15 % Gel Unknown Patient Yes No   Sig: Apply 1 Application topically every day. Apply's on face   Biotin-Vitamin C (HAIR SKIN NAILS GUMMIES PO) 5/17/2025 Evening Patient Yes Yes   Sig: Take 2 Tablets by mouth every day.   Ca Phosphate-Cholecalciferol (CALCIUM/VITAMIN D3 GUMMIES PO) 5/17/2025 Evening Patient Yes Yes   Sig: Take 2 Tablets by mouth every day.   Cyanocobalamin (B-12 PO) 5/17/2025 Evening Patient Yes Yes   Sig: Take 2 Tablets by mouth every day. gummies   MAGNESIUM PO 5/17/2025 Evening Patient Yes Yes   Sig: Take 2 Tablets by mouth  every day. gummies   Multiple Vitamins-Minerals (MULTI-VITAMIN GUMMIES PO) 5/17/2025 Evening Patient Yes Yes   Sig: Take 2 Tablets by mouth every day.   POTASSIUM CITRATE PO 5/17/2025 Evening Patient Yes Yes   Sig: Take 2 Tablets by mouth every day. Gummy   Probiotic Product (PROBIOTIC GUMMIES PO) 5/18/2025 at  6:00 AM Patient Yes Yes   Sig: Take 1 Tablet by mouth in the morning, at noon, and at bedtime.   diazePAM (VALIUM) 5 MG Tab Unknown  Yes No   Sig: Take 5 mg by mouth every 6 hours as needed (Back Spasms). Indications: Muscle Spasm   dronabinol (MARINOL) 2.5 MG Cap New Rx Patient No No   Sig: Take 1 Capsule by mouth 2 times a day for 30 days.   furosemide (LASIX) 20 MG Tab Not Taking Patient No No   Sig: Take 1 Tablet by mouth see administration instructions. Once daily as needed for edema   Patient not taking: Reported on 5/18/2025   hydroxychloroquine (PLAQUENIL) 200 MG Tab 5/18/2025 at  6:00 AM Patient No Yes   Sig: Take 1 Tablet by mouth every day.   levalbuterol (XOPENEX HFA) 45 MCG/ACT inhaler Not Taking Patient No No   Sig: Inhale 2 Puffs every four hours as needed for Shortness of Breath (As needed for shortness of breath, cough, wheezing.).   Patient not taking: Reported on 5/18/2025   metoprolol tartrate (LOPRESSOR) 50 MG Tab 5/18/2025 at  6:00 AM Patient No Yes   Sig: Take 1 Tablet by mouth 2 times a day.   naratriptan (AMERGE) 2.5 MG tablet Unknown Patient No No   Sig: Take 1 Tablet by mouth as needed for Migraine.   nystatin (MYCOSTATIN) 483413 UNIT/ML Suspension Not Taking Patient No No   Sig: Take 5 mL by mouth 4 times a day.   Patient not taking: Reported on 5/18/2025   ondansetron (ZOFRAN ODT) 4 MG TABLET DISPERSIBLE Unknown Patient No No   Sig: Take 1 Tablet by mouth every 6 hours as needed for Nausea/Vomiting.   oxyCODONE immediate release (ROXICODONE) 10 MG immediate release tablet 5/18/2025 at  6:00 AM Patient No Yes   Sig: Take 1 Tablet by mouth every 6 hours as needed for Moderate Pain  (Refill #1 of #3) for up to 30 days.   Patient taking differently: Take 10 mg by mouth 2 times a day.   oxyCODONE immediate release (ROXICODONE) 10 MG immediate release tablet  Patient No No   Sig: Take 1 Tablet by mouth every 6 hours as needed for Moderate Pain (Refill #2 of #3) for up to 30 days.   oxyCODONE immediate release (ROXICODONE) 10 MG immediate release tablet  Patient No No   Sig: Take 1 Tablet by mouth every 6 hours as needed for Moderate Pain (Refill #3 of #3) for up to 30 days.   potassium chloride SA (KDUR) 20 MEQ Tab CR Not Taking Patient No No   Sig: TAKE 1 TABLET BY MOUTH TWICE DAILY   Patient not taking: Reported on 5/18/2025   prochlorperazine (COMPAZINE) 10 MG Tab Unknown Patient No No   Sig: Take 1 Tablet by mouth every 6 hours as needed for Nausea/Vomiting.   spironolactone (ALDACTONE) 25 MG Tab 5/18/2025 at  6:00 AM Patient No Yes   Sig: Take 1 Tablet by mouth every day.      Facility-Administered Medications: None       Physical Exam  Temp:  [37.3 °C (99.1 °F)] 37.3 °C (99.1 °F)  Pulse:  [82-85] 82  Resp:  [17-20] 17  BP: (103-119)/(62-79) 114/62  SpO2:  [94 %-96 %] 96 %  Blood Pressure : 114/62   Temperature: 37.3 °C (99.1 °F)   Pulse: 82   Respiration: 17   Pulse Oximetry: 96 %       Physical Exam  Vitals and nursing note reviewed. Exam conducted with a chaperone present.   Constitutional:       General: She is awake.      Appearance: Normal appearance. She is well-developed, well-groomed and normal weight. She is ill-appearing.   HENT:      Head: Normocephalic and atraumatic.      Jaw: There is normal jaw occlusion. No trismus.      Salivary Glands: Right salivary gland is not tender. Left salivary gland is not tender.      Right Ear: External ear normal.      Left Ear: External ear normal.      Nose: Nose normal.      Mouth/Throat:      Mouth: Mucous membranes are dry.      Pharynx: Oropharynx is clear.   Eyes:      General: Lids are normal. Vision grossly intact.      Extraocular  Movements: Extraocular movements intact.      Conjunctiva/sclera: Conjunctivae normal.      Right eye: Right conjunctiva is not injected. No exudate.     Left eye: Left conjunctiva is not injected. No exudate.     Pupils: Pupils are equal, round, and reactive to light.   Neck:      Thyroid: No thyroid mass.      Vascular: No carotid bruit, hepatojugular reflux or JVD.      Trachea: No abnormal tracheal secretions or tracheal deviation.   Cardiovascular:      Rate and Rhythm: Normal rate and regular rhythm. Occasional Extrasystoles are present.     Pulses: Normal pulses.      Heart sounds: Normal heart sounds. No murmur heard.     No friction rub.   Pulmonary:      Effort: Pulmonary effort is normal.      Breath sounds: Examination of the right-lower field reveals decreased breath sounds. Examination of the left-lower field reveals decreased breath sounds. Decreased breath sounds present. No wheezing or rhonchi.   Abdominal:      General: Abdomen is flat. Bowel sounds are normal.      Palpations: Abdomen is soft.      Tenderness: There is abdominal tenderness in the right lower quadrant. There is no right CVA tenderness or left CVA tenderness.      Hernia: No hernia is present.   Musculoskeletal:      Cervical back: Full passive range of motion without pain, normal range of motion and neck supple. No rigidity. No muscular tenderness.      Right lower leg: No edema.      Left lower leg: No edema.      Comments: Right flank pain 10 out of 10 and reproducible   Lymphadenopathy:      Head:      Right side of head: No submental adenopathy.      Left side of head: No submental adenopathy.      Cervical:      Right cervical: No superficial cervical adenopathy.     Left cervical: No superficial cervical adenopathy.      Upper Body:      Right upper body: No supraclavicular adenopathy.      Left upper body: No supraclavicular adenopathy.   Skin:     General: Skin is warm and dry.      Capillary Refill: Capillary refill takes  "2 to 3 seconds.      Coloration: Skin is not cyanotic or pale.      Findings: No abrasion or bruising.   Neurological:      General: No focal deficit present.      Mental Status: She is alert and oriented to person, place, and time. Mental status is at baseline.      GCS: GCS eye subscore is 4. GCS verbal subscore is 5. GCS motor subscore is 6.      Cranial Nerves: No cranial nerve deficit.      Sensory: No sensory deficit.      Motor: Motor function is intact. No weakness.      Coordination: Coordination normal.      Gait: Gait normal.      Deep Tendon Reflexes:      Reflex Scores:       Tricep reflexes are 2+ on the right side and 2+ on the left side.       Bicep reflexes are 2+ on the right side and 2+ on the left side.       Brachioradialis reflexes are 2+ on the right side and 2+ on the left side.       Patellar reflexes are 2+ on the right side and 2+ on the left side.       Achilles reflexes are 2+ on the right side and 2+ on the left side.  Psychiatric:         Attention and Perception: Attention and perception normal.         Mood and Affect: Mood normal.         Speech: Speech normal.         Behavior: Behavior normal. Behavior is cooperative.         Thought Content: Thought content normal.         Cognition and Memory: Cognition and memory normal.         Judgment: Judgment normal.         Laboratory:  Recent Labs     05/16/25  1150 05/18/25  1134   WBC 8.5 10.7   RBC 4.08* 4.67   HEMOGLOBIN 12.3 14.2   HEMATOCRIT 38.1 43.8   MCV 93.4 93.8   MCH 30.1 30.4   MCHC 32.3 32.4   RDW 43.0 44.0   PLATELETCT 189 242   MPV 10.2 10.3     Recent Labs     05/18/25  1134   SODIUM 141   POTASSIUM 4.3   CHLORIDE 104   CO2 21   GLUCOSE 107*   BUN 23*   CREATININE 1.62*   CALCIUM 10.2     Recent Labs     05/18/25  1134   ALTSGPT 30   ASTSGOT 37   ALKPHOSPHAT 110*   TBILIRUBIN 0.5   LIPASE 32   GLUCOSE 107*     Recent Labs     05/16/25  1150   INR 0.99     No results for input(s): \"NTPROBNP\" in the last 72 hours.      No " "results for input(s): \"TROPONINT\" in the last 72 hours.    Imaging:  CT-RENAL COLIC EVALUATION(A/P W/O)   Final Result      1.  There is a 5 mm stone in the distal right ureter with moderate right-sided hydroureteronephrosis.   2.  Atherosclerosis.   3.  Status post colectomy with left abdominal ileostomy.      [id number] VIEW    (Results Pending)   DX-PORTABLE FLUOROSCOPY < 1 HOUR Reason For Exam: RLQ PAIN (That radiates into the back, pt has hx of kidney stones   Pt states that she had a procedure on Friday \"an injection into my spinal\" ), IRREGULAR HEART BEAT (Pt has history of Afib but is...    (Results Pending)       X-Ray:  I have personally reviewed the images and compared with prior images.  EKG:  I have personally reviewed the images and compared with prior images.    Assessment/Plan:  Justification for Admission Status  I anticipate this patient will require at least two midnights for appropriate medical management, necessitating inpatient admission because patient has nephrolithiasis with hydronephrosis and urinary infection will require at least 48 hours of inpatient management with antibiotics    Patient will need a Med/Surg bed on MEDICAL service .  The need is secondary to nephrolithiasis with infection.    * Nephrolithiasis- (present on admission)  Assessment & Plan  Patient has a 5 mm right distal ureteral stone that has developed into an obstruction as well as became infected.  I will start the patient antibiotics and IV Rocephin after obtaining cultures.  I will monitor procalcitonin level lactic acid level  I will initiate the patient on fluid resuscitation  Urology has been consulted they are planning to take the patient to the operating room this afternoon  I will keep her n.p.o.  Have started her on IV Dilaudid for pain management 0.5 mg now and then 0.5 mg every 3 hours as needed    Essential hypertension- (present on admission)  Assessment & Plan  I will optimize blood pressure management " keep blood pressure less than 140 diastolic under 90  I will continue metoprolol tart Steve 50 mg twice daily  I will use labetalol as needed    Atrial fibrillation (HCC)- (present on admission)  Assessment & Plan  I will continue rate control with metoprolol  Patient is not on anticoagulation due to bleeding risk from her Crohn's disease.    COPD (chronic obstructive pulmonary disease) ([facility])- (present on admission)  Assessment & Plan  Continue with RT protocol nebulizer treatments and oxygen support as needed    Crohn's disease of small intestine with complication ([facility])- (present on admission)  Assessment & Plan  Status post colectomy and partial small bowel resection  Patient has an ileostomy in place    Chronic kidney disease, stage 3b- (present on admission)  Assessment & Plan  I will monitor renal functions and optimize medication management  I will hydrate her with normal saline at 100 mL/h    History of DVT (deep vein thrombosis)- (present on admission)  Assessment & Plan  Currently not on anticoagulation due to bleeding risk from chronic disease    Ileostomy in place ([facility])- (present on admission)  Assessment & Plan  Chronic ileostomy continue with ileostomy care    GERD (gastroesophageal reflux disease)- (present on admission)  Assessment & Plan  I will continue PPI therapy with omeprazole        VTE prophylaxis: SCDs/TEDs       [1]   Allergies  Allergen Reactions    Amoxicillin Anaphylaxis    Demerol Hcl [Meperidine] Shortness of Breath and Palpitations    Doxycycline Rash     Throat also closes up     Methotrexate Hives, Shortness of Breath and Rash          Morphine Shortness of Breath and Palpitations    Promethazine Shortness of Breath and Rash          Remicade [Infliximab] Hives, Shortness of Breath and Rash          Sudafed [Pseudoephedrine] Shortness of Breath, Vomiting and Palpitations    Azathioprine Sodium Hives and Shortness of Breath     10/21/2022 - patient unable to verify  "allergy/reaction  Historical reaction listed: Hives, Shortness of Breath    Carafate [Sucralfate] Vomiting and Nausea     + Mouth Sores    Other Drug Unspecified     \"STEROIDS\" - REACTION NOT SPECIFIED    Sulfa Drugs Rash          Sulfasalazine Rash          Gabapentin Cough, Hives, Itching, Nausea, Palpitations, Photosensitivity, Rash, Runny Nose, Swelling, Unspecified and Vomiting    Nitroglycerin      Headache    Prednisone      Other Reaction(s): Reaction:GI system trouble is able to take orally    Amitriptyline Unspecified     Nightmares      Ativan [Lorazepam] Unspecified     Nightmares    Betamethasone Unspecified     10/21/2022 - patient unable to verify allergy/reaction  No historical reaction listed  Patient is able to tollerate prednesone 4/24/2025.     Fentanyl Unspecified     \"Patches didn't work\" and skin broke down    Lyrica [Pregabalin] Nausea          Methadone Unspecified     \"Didn't work\"    Potassium Unspecified     Notes: In IV only  REACTION NOT SPECIFIED    Tizanidine Unspecified     10/21/2022 - patient unable to verify allergy/reaction  No historical reaction listed     "

## 2025-05-19 VITALS
HEIGHT: 72 IN | OXYGEN SATURATION: 96 % | BODY MASS INDEX: 18.57 KG/M2 | HEART RATE: 84 BPM | DIASTOLIC BLOOD PRESSURE: 68 MMHG | WEIGHT: 137.13 LBS | SYSTOLIC BLOOD PRESSURE: 125 MMHG | TEMPERATURE: 97 F | RESPIRATION RATE: 10 BRPM

## 2025-05-19 PROBLEM — N39.0 URINARY TRACT INFECTION: Status: ACTIVE | Noted: 2025-05-19

## 2025-05-19 LAB
ANION GAP SERPL CALC-SCNC: 15 MMOL/L (ref 7–16)
BUN SERPL-MCNC: 27 MG/DL (ref 8–22)
CALCIUM SERPL-MCNC: 9 MG/DL (ref 8.5–10.5)
CHLORIDE SERPL-SCNC: 104 MMOL/L (ref 96–112)
CO2 SERPL-SCNC: 19 MMOL/L (ref 20–33)
CREAT SERPL-MCNC: 1.54 MG/DL (ref 0.5–1.4)
ERYTHROCYTE [DISTWIDTH] IN BLOOD BY AUTOMATED COUNT: 44.2 FL (ref 35.9–50)
GFR SERPLBLD CREATININE-BSD FMLA CKD-EPI: 36 ML/MIN/1.73 M 2
GLUCOSE SERPL-MCNC: 247 MG/DL (ref 65–99)
HCT VFR BLD AUTO: 35.1 % (ref 37–47)
HGB BLD-MCNC: 11.5 G/DL (ref 12–16)
MCH RBC QN AUTO: 30.8 PG (ref 27–33)
MCHC RBC AUTO-ENTMCNC: 32.8 G/DL (ref 32.2–35.5)
MCV RBC AUTO: 94.1 FL (ref 81.4–97.8)
PLATELET # BLD AUTO: 174 K/UL (ref 164–446)
PMV BLD AUTO: 10.5 FL (ref 9–12.9)
POTASSIUM SERPL-SCNC: 4.3 MMOL/L (ref 3.6–5.5)
RBC # BLD AUTO: 3.73 M/UL (ref 4.2–5.4)
SODIUM SERPL-SCNC: 138 MMOL/L (ref 135–145)
WBC # BLD AUTO: 8.7 K/UL (ref 4.8–10.8)

## 2025-05-19 PROCEDURE — 94760 N-INVAS EAR/PLS OXIMETRY 1: CPT

## 2025-05-19 PROCEDURE — 700105 HCHG RX REV CODE 258: Performed by: INTERNAL MEDICINE

## 2025-05-19 PROCEDURE — 700105 HCHG RX REV CODE 258: Performed by: HOSPITALIST

## 2025-05-19 PROCEDURE — 700102 HCHG RX REV CODE 250 W/ 637 OVERRIDE(OP): Performed by: INTERNAL MEDICINE

## 2025-05-19 PROCEDURE — A9270 NON-COVERED ITEM OR SERVICE: HCPCS | Performed by: HOSPITALIST

## 2025-05-19 PROCEDURE — 700102 HCHG RX REV CODE 250 W/ 637 OVERRIDE(OP): Performed by: HOSPITALIST

## 2025-05-19 PROCEDURE — 87040 BLOOD CULTURE FOR BACTERIA: CPT | Mod: 91

## 2025-05-19 PROCEDURE — 36415 COLL VENOUS BLD VENIPUNCTURE: CPT

## 2025-05-19 PROCEDURE — 700111 HCHG RX REV CODE 636 W/ 250 OVERRIDE (IP): Mod: JZ | Performed by: INTERNAL MEDICINE

## 2025-05-19 PROCEDURE — 80048 BASIC METABOLIC PNL TOTAL CA: CPT

## 2025-05-19 PROCEDURE — 85027 COMPLETE CBC AUTOMATED: CPT

## 2025-05-19 PROCEDURE — A9270 NON-COVERED ITEM OR SERVICE: HCPCS | Performed by: INTERNAL MEDICINE

## 2025-05-19 PROCEDURE — 700111 HCHG RX REV CODE 636 W/ 250 OVERRIDE (IP): Mod: JZ | Performed by: HOSPITALIST

## 2025-05-19 RX ORDER — OXYCODONE HCL 5 MG/5 ML
10 SOLUTION, ORAL ORAL EVERY 4 HOURS PRN
Refills: 0 | Status: DISCONTINUED | OUTPATIENT
Start: 2025-05-19 | End: 2025-05-19

## 2025-05-19 RX ORDER — OXYCODONE HCL 5 MG/5 ML
10 SOLUTION, ORAL ORAL EVERY 6 HOURS PRN
Refills: 0 | Status: DISCONTINUED | OUTPATIENT
Start: 2025-05-19 | End: 2025-05-19 | Stop reason: HOSPADM

## 2025-05-19 RX ORDER — DIAZEPAM 10 MG/2ML
10 INJECTION, SOLUTION INTRAMUSCULAR; INTRAVENOUS ONCE
Status: COMPLETED | OUTPATIENT
Start: 2025-05-19 | End: 2025-05-19

## 2025-05-19 RX ORDER — CEFPROZIL 250 MG/5ML
500 POWDER, FOR SUSPENSION ORAL 2 TIMES DAILY
Qty: 100 ML | Refills: 0 | Status: ACTIVE | OUTPATIENT
Start: 2025-05-19 | End: 2025-05-21 | Stop reason: SDUPTHER

## 2025-05-19 RX ORDER — OXYCODONE HCL 5 MG/5 ML
5 SOLUTION, ORAL ORAL EVERY 4 HOURS PRN
Refills: 0 | Status: DISCONTINUED | OUTPATIENT
Start: 2025-05-19 | End: 2025-05-19

## 2025-05-19 RX ADMIN — HYDROXYCHLOROQUINE SULFATE 200 MG: 200 TABLET ORAL at 06:16

## 2025-05-19 RX ADMIN — CEFTRIAXONE SODIUM 1000 MG: 1 INJECTION, POWDER, FOR SOLUTION INTRAMUSCULAR; INTRAVENOUS at 10:19

## 2025-05-19 RX ADMIN — DIAZEPAM 10 MG: 5 INJECTION, SOLUTION INTRAMUSCULAR; INTRAVENOUS at 12:39

## 2025-05-19 RX ADMIN — HYDROMORPHONE HYDROCHLORIDE 0.5 MG: 1 INJECTION, SOLUTION INTRAMUSCULAR; INTRAVENOUS; SUBCUTANEOUS at 07:03

## 2025-05-19 RX ADMIN — OXYCODONE HYDROCHLORIDE 10 MG: 5 SOLUTION ORAL at 09:40

## 2025-05-19 RX ADMIN — OXYCODONE HYDROCHLORIDE 10 MG: 10 TABLET ORAL at 06:17

## 2025-05-19 RX ADMIN — METOPROLOL TARTRATE 50 MG: 50 TABLET, FILM COATED ORAL at 06:18

## 2025-05-19 RX ADMIN — SODIUM CHLORIDE: 9 INJECTION, SOLUTION INTRAVENOUS at 08:44

## 2025-05-19 ASSESSMENT — ENCOUNTER SYMPTOMS
FEVER: 0
SHORTNESS OF BREATH: 1
DIAPHORESIS: 0
FLANK PAIN: 1
COUGH: 1
NERVOUS/ANXIOUS: 1
CHILLS: 0

## 2025-05-19 ASSESSMENT — COGNITIVE AND FUNCTIONAL STATUS - GENERAL
SUGGESTED CMS G CODE MODIFIER MOBILITY: CJ
MOBILITY SCORE: 22
CLIMB 3 TO 5 STEPS WITH RAILING: A LITTLE
DAILY ACTIVITIY SCORE: 24
WALKING IN HOSPITAL ROOM: A LITTLE
SUGGESTED CMS G CODE MODIFIER DAILY ACTIVITY: CH

## 2025-05-19 ASSESSMENT — FIBROSIS 4 INDEX: FIB4 SCORE: 2.76

## 2025-05-19 ASSESSMENT — PAIN DESCRIPTION - PAIN TYPE
TYPE: ACUTE PAIN

## 2025-05-19 NOTE — PROGRESS NOTES
Hospital Medicine Daily Progress Note    Date of Service  5/19/2025    Chief Complaint  Jana Overton is a 69 y.o. female admitted 5/18/2025 with obstructing nephrolithiasis.  Urinalysis on presentation also consistent with UTI    Hospital Course  No notes on file    Interval Problem Update  Patient was doing well this morning appear to be ready for discharge, was only having some mild flank pain but was having a lot of throat clearing after having been in the OR.  She did not feel she could swallow pills and was changed to liquid oxycodone.  Discharge was prepared and liquid antibiotics were sent in since the pills are generally large.    Patient had lunch part of which consisted of some chicken and she developed some esophageal spasm after the chicken got stuck due to achalasia, following this became very anxious and was having coughing and some regurgitation of the food.    More medications ordered.    I have discussed this patient's plan of care and discharge plan at IDT rounds today with Case Management, Nursing, Nursing leadership, and other members of the IDT team.    Consultants/Specialty  urology    Code Status  Full Code    Disposition  The patient is medically cleared for discharge to home or a post-acute facility.  Anticipate discharge to: home with close outpatient follow-up    I have placed the appropriate orders for post-discharge needs.    Review of Systems  Review of Systems   Constitutional:  Negative for chills, diaphoresis and fever.   Respiratory:  Positive for cough (After choking episode) and shortness of breath.    Cardiovascular:  Positive for chest pain (Due to esophageal spasm).   Gastrointestinal:         Dysphagia, esophageal spasm, cannot swallow pills  Difficulty with chicken at lunch   Genitourinary:  Positive for flank pain (R). Negative for dysuria.   Psychiatric/Behavioral:  The patient is nervous/anxious.         Physical Exam  Temp:  [36.1 °C (97 °F)-37.3 °C (99.2 °F)] 36.1  °C (97 °F)  Pulse:  [] 84  Resp:  [10-19] 10  BP: (113-187)/(50-91) 125/68  SpO2:  [91 %-100 %] 96 %    Physical Exam  Vitals and nursing note reviewed.   Constitutional:       General: She is in acute distress (When seen second time patient was coughing and had regurgitated some chicken).      Appearance: She is ill-appearing (Chronically).      Comments: Thin   HENT:      Head: Normocephalic and atraumatic.      Nose: Nose normal.      Mouth/Throat:      Mouth: Mucous membranes are moist.   Eyes:      Extraocular Movements: Extraocular movements intact.      Pupils: Pupils are equal, round, and reactive to light.   Cardiovascular:      Rate and Rhythm: Normal rate and regular rhythm.   Pulmonary:      Comments: Respiratory difficulty due to coughing and choking  Abdominal:      Tenderness: There is right CVA tenderness.      Comments: ostomy   Musculoskeletal:      Right lower leg: No edema.      Left lower leg: No edema.   Skin:     General: Skin is warm and dry.   Neurological:      General: No focal deficit present.      Mental Status: She is alert and oriented to person, place, and time.      Cranial Nerves: No cranial nerve deficit.   Psychiatric:      Comments: Anxious         Fluids  No intake or output data in the 24 hours ending 05/19/25 1325     Laboratory  Recent Labs     05/18/25  1134 05/19/25  0021   WBC 10.7 8.7   RBC 4.67 3.73*   HEMOGLOBIN 14.2 11.5*   HEMATOCRIT 43.8 35.1*   MCV 93.8 94.1   MCH 30.4 30.8   MCHC 32.4 32.8   RDW 44.0 44.2   PLATELETCT 242 174   MPV 10.3 10.5     Recent Labs     05/18/25  1134 05/19/25  0021   SODIUM 141 138   POTASSIUM 4.3 4.3   CHLORIDE 104 104   CO2 21 19*   GLUCOSE 107* 247*   BUN 23* 27*   CREATININE 1.62* 1.54*   CALCIUM 10.2 9.0                   Imaging  [id number] VIEW   Preliminary Result      Digitized intraoperative radiograph is submitted for review. This examination is not for diagnostic purpose but for guidance during a surgical procedure.  Please see the patient's chart for full procedural details.         INTERPRETING LOCATION: [address], [place], [address]      DX-PORTABLE FLUOROSCOPY < 1 HOUR Reason For Exam: RLQ PAIN (Pt has history of Afib but is normally SR  Pt states that yesterday she was in an out of Afib but that it seems to have resolve  ), IRREGULAR HEART BEAT (Pt has history of Afib but is nor...   Preliminary Result      Portable fluoroscopy utilized for 5 seconds.         INTERPRETING LOCATION: [address], [place], [address]      CT-RENAL COLIC EVALUATION(A/P W/O)   Final Result      1.  There is a 5 mm stone in the distal right ureter with moderate right-sided hydroureteronephrosis.   2.  Atherosclerosis.   3.  Status post colectomy with left abdominal ileostomy.           Assessment/Plan  * Nephrolithiasis- (present on admission)  Assessment & Plan  S/p  Cystoscopy, right  [id number]   Purulent drainage noted during procedure, DC on atb    Urinary tract infection- (present on admission)  Assessment & Plan  Empirically on ceftriaxone, continue antibiotics based discharge, cultures are pending    Chronic kidney disease, stage 3b- (present on admission)  Assessment & Plan  Avoid nephrotoxins    Achalasia- (present on admission)  Assessment & Plan  With significant dysphagia, plan as noted    Essential hypertension- (present on admission)  Assessment & Plan  Continue metoprolol    History of DVT (deep vein thrombosis)- (present on admission)  Assessment & Plan  Currently not on anticoagulation due to bleeding risk from chronic disease    Atrial fibrillation (HCC)- (present on admission)  Assessment & Plan  Continue metoprolol, not a candidate for anticoagulation due to chronic gastrointestinal issues    COPD (chronic obstructive pulmonary disease) ([facility])- (present on admission)  Assessment & Plan  Continue with RT protocol nebulizer treatments and oxygen support as needed    Ileostomy in place ([facility])- (present on admission)  Assessment &  Plan  Chronic ileostomy continue with ileostomy care    GERD (gastroesophageal reflux disease)- (present on admission)  Assessment & Plan  And Achalasia with chronic dysphagia  Swallowing difficulties postoperative, using liquid oxycodone for now, will give GI cocktail and Valium as patient had panic attack after having esophageal spasm  continue PPI therapy with omeprazole    Opioid dependence (HCC)- (present on admission)  Assessment & Plan  Chronic use of oxycodone 10 mg twice daily    Crohn's disease of small intestine with complication (HCC)- (present on admission)  Assessment & Plan  Status post colectomy and partial small bowel resection  Patient has an ileostomy in place         VTE prophylaxis:   SCDs/TEDs      I have performed a physical exam and reviewed and updated ROS and Plan today (5/19/2025). In review of yesterday's note (5/18/2025), there are no changes except as documented above.

## 2025-05-19 NOTE — PROGRESS NOTES
Pt allergies and NPO status verified. Home medications reconciled, preferred pharmacy verified.  IV access assessed, flushes well.. All questions answered. Bed in lowest position, call light within reach.

## 2025-05-19 NOTE — DISCHARGE SUMMARY
"Discharge Summary    CHIEF COMPLAINT ON ADMISSION  Chief Complaint   Patient presents with    RLQ Pain     That radiates into the back, pt has hx of kidney stones   Pt states that she had a procedure on Friday \"an injection into my spinal\"     Irregular Heart Beat     Pt has history of Afib but is normally SR  Pt states that yesterday she was in an out of Afib but that it seems to have resolve         Reason for Admission  Flank Pain, Other     Admission Date  5/18/2025    CODE STATUS  Full Code    HPI & HOSPITAL COURSE  This is a 69 y.o. female here with symptomatic right nephrolithiasis and UTI.    Patient presented with right sided nephrolithiasis and obstruction, urinalysis consistent with with urinary tract infection although she denied systemic symptoms.  In the OR patient underwent stent placement and purulent material was obtained.  She tolerated ceftriaxone but has multiple drug allergies.     patient having repeat some dysphagia post surgery with is an exacerbation of her chronic achalasia, this was exacerbated when she tried to eat chicken at lunch.    She was given medications for this.  She did express a preference for liquid antibiotics because antibiotic tablets are rather large and would be difficult to crush and take by mouth.    Following her severe episode of dysphagia and spasm, she refused to take GI cocktail or take anything by mouth but wanted to go home.  I stipulated the patient cannot go home unless she is able to demonstrate tolerance of liquids, she continued to refuse and will go home anyways    Therefore, she is discharged AMA and stable condition to home with close outpatient follow-up.  Patient clinically stable but unable to demonstrate tolerance of oral intake.    The patient recovered much more quickly than anticipated on admission.    Discharge Date  5/19/2025    FOLLOW UP ITEMS POST DISCHARGE  Urology    DISCHARGE DIAGNOSES  Principal Problem:    Nephrolithiasis (POA: " Yes)  Active Problems:    Crohn's disease of small intestine with complication (HCC) (POA: Yes)      Overview: Followed by GI Dr. Cruz      S/p ileostomy      Scope 5/2014-no strictures, fistulas, or new abscesses    Opioid dependence (HCC) (POA: Yes)    GERD (gastroesophageal reflux disease) (POA: Yes)    Ileostomy in place (HCC) (POA: Yes)    COPD (chronic obstructive pulmonary disease) (HCC) (POA: Yes)      Overview: On oxygen at night    Atrial fibrillation (HCC) (POA: Yes)    History of DVT (deep vein thrombosis) (POA: Yes)    Essential hypertension (POA: Yes)    Achalasia (POA: Yes)    Chronic kidney disease, stage 3b (POA: Yes)    Urinary tract infection (POA: Yes)  Resolved Problems:    Mild tetrahydrocannabinol (THC) abuse (POA: Yes)      FOLLOW UP  Future Appointments   Date Time Provider Department Center   5/29/2025  3:40 PM Chase Charles D.O. [facility]   7/2/2025  3:00 PM Chase Charles D.O. [facility]   9/24/2025  3:30 PM ROXANNE Larry None     No follow-up provider specified.    MEDICATIONS ON DISCHARGE     Medication List        START taking these medications        Instructions   cefPROZIL 250 MG/5ML Susr  Commonly known as: Cefzil   Doctor's comments: Tolerated IV ceftriaxone in hospital  Take 10 mL by mouth 2 times a day for 5 days.  Dose: 500 mg            CHANGE how you take these medications        Instructions   oxyCODONE immediate release 10 MG immediate release tablet  What changed:   when to take this  Another medication with the same name was removed. Continue taking this medication, and follow the directions you see here.  Commonly known as: Roxicodone   Take 1 Tablet by mouth every 6 hours as needed for Moderate Pain (Refill #1 of #3) for up to 30 days.  Dose: 10 mg            CONTINUE taking these medications        Instructions   Azelaic Acid 15 % Gel   Apply 1 Application topically every day. Apply's on face  Dose: 1 Application     B-12 PO    Take 2 Tablets by mouth every day. gummies  Dose: 2 Tablet     CALCIUM/VITAMIN D3 GUMMIES PO   Take 2 Tablets by mouth every day.  Dose: 2 Tablet     diazePAM 5 MG Tabs  Commonly known as: Valium   Take 5 mg by mouth every 6 hours as needed (Back Spasms). Indications: Muscle Spasm  Dose: 5 mg     dronabinol 2.5 MG Caps  Commonly known as: Marinol   Take 1 Capsule by mouth 2 times a day for 30 days.  Dose: 2.5 mg     HAIR SKIN NAILS GUMMIES PO   Take 2 Tablets by mouth every day.  Dose: 2 Tablet     hydroxychloroquine 200 MG Tabs  Commonly known as: Plaquenil   Take 1 Tablet by mouth every day.  Dose: 200 mg     MAGNESIUM PO   Take 2 Tablets by mouth every day. gummies  Dose: 2 Tablet     metoprolol tartrate 50 MG Tabs  Commonly known as: Lopressor   Take 1 Tablet by mouth 2 times a day.  Dose: 50 mg     MULTI-VITAMIN GUMMIES PO   Take 2 Tablets by mouth every day.  Dose: 2 Tablet     naratriptan 2.5 MG tablet  Commonly known as: Amerge   Take 1 Tablet by mouth as needed for Migraine.  Dose: 2.5 mg     ondansetron 4 MG Tbdp  Commonly known as: Zofran ODT   Take 1 Tablet by mouth every 6 hours as needed for Nausea/Vomiting.  Dose: 4 mg     POTASSIUM CITRATE PO   Take 2 Tablets by mouth every day. Gummy  Dose: 2 Tablet     PROBIOTIC GUMMIES PO   Take 1 Tablet by mouth in the morning, at noon, and at bedtime.  Dose: 1 Tablet     prochlorperazine 10 MG Tabs  Commonly known as: Compazine   Take 1 Tablet by mouth every 6 hours as needed for Nausea/Vomiting.  Dose: 10 mg     spironolactone 25 MG Tabs  Commonly known as: Aldactone   Take 1 Tablet by mouth every day.  Dose: 25 mg     VITAMIN C GUMMIE PO   Take 2 Tablets by mouth every day.  Dose: 2 Tablet            STOP taking these medications      furosemide 20 MG Tabs  Commonly known as: Lasix     levalbuterol 45 MCG/ACT inhaler  Commonly known as: Xopenex HFA     nystatin 605406 UNIT/ML Susp  Commonly known as: Mycostatin     potassium chloride SA 20 MEQ Tbcr  Commonly  known as: Kdur              Allergies  Allergies[1]    DIET  Full liquid to GI soft      ACTIVITY  As tolerated    CONSULTATIONS  urology    PROCEDURES  Chloe Narayan M.D.  Physician  Urology     OP Report     Signed     Date of Service: 5/18/2025  7:18 PM  Case Time: Procedures: Surgeons:   5/18/2025  6:16 PM CYSTOSCOPY, WITH URETERAL STENT INSERTION    Chloe Narayan M.D.                 SURGEON: Dr. Chloe Narayan       ANESTHESIA: General (general endotracheal tube)       PRE-OPERATIVE DIAGNOSIS: right ureteral stone     POST-OPERATIVE DIAGNOSIS: Same       NAME OF PROCEDURE: Cystoscopy, right  [id number]      FINDINGS OF PROCEDURE: purulent drainage after placement of wire and stent. Good proximal and distal curl noted on fluoroscopy and direct inspection.      EBL: 0 cc       COMPLICATIONS: None       PATIENT CONDITION: stable       INDICATIONS: Jana Overton is a 69 y.o. female who agreed to above procedure for further management of kidney stones after complete discussion of risks, benefits, and alternatives.       PROCEDURE:      After informed consent was obtained in the preoperative care unit, the patient was taken to the OR on a stretcher. The patient was properly identified and placed in supine position per OR protocol. The patient was given a prophylactic dose of ancef 2 grams. General (general endotracheal tube) was administered. The patient was then placed in dorsal lithotomy, prepped and draped in a standard sterile fashion.  A timeout was performed with all parties in agreement.        A 22Fr rigid cystoscope was inserted per urethra. A sensor wire was passed into the right ureteral orifice up to the level of the kidney, confirmed under fluoroscopy.      A [id number] ureteral stent was passed over the sensor wire and into the kidney, with it’s position confirmed under fluoroscopic guidance. The wire was removed and a good proximal and distal curl were noted in the renal pelvis and bladder  "respectively with fluoroscopy. I drained the patient's bladder. This concluded the procedure. The patient tolerated it well and was transferred to the PACU in stable condition.          DISPOSITION: The patient will be observed overnight with plan for definitive stone management as an outpatient.                LABORATORY  Lab Results   Component Value Date    SODIUM 138 05/19/2025    POTASSIUM 4.3 05/19/2025    CHLORIDE 104 05/19/2025    CO2 19 (L) 05/19/2025    GLUCOSE 247 (H) 05/19/2025    BUN 27 (H) 05/19/2025    CREATININE 1.54 (H) 05/19/2025    CREATININE 0.89 02/10/2010    GLOMRATE >59 02/10/2010        Lab Results   Component Value Date    WBC 8.7 05/19/2025    WBC 7.9 02/10/2010    HEMOGLOBIN 11.5 (L) 05/19/2025    HEMATOCRIT 35.1 (L) 05/19/2025    PLATELETCT 174 05/19/2025        Total time of the discharge process exceeds 50 minutes.         [1]   Allergies  Allergen Reactions    Amoxicillin Anaphylaxis    Demerol Hcl [Meperidine] Shortness of Breath and Palpitations    Doxycycline Rash     Throat also closes up     Methotrexate Hives, Shortness of Breath and Rash          Morphine Shortness of Breath and Palpitations    Promethazine Shortness of Breath and Rash          Remicade [Infliximab] Hives, Shortness of Breath and Rash          Sudafed [Pseudoephedrine] Shortness of Breath, Vomiting and Palpitations    Azathioprine Sodium Hives and Shortness of Breath     10/21/2022 - patient unable to verify allergy/reaction  Historical reaction listed: Hives, Shortness of Breath    Carafate [Sucralfate] Vomiting and Nausea     + Mouth Sores    Other Drug Unspecified     \"STEROIDS\" - REACTION NOT SPECIFIED    Sulfa Drugs Rash          Sulfasalazine Rash          Gabapentin Cough, Hives, Itching, Nausea, Palpitations, Photosensitivity, Rash, Runny Nose, Swelling, Unspecified and Vomiting    Nitroglycerin      Headache    Prednisone      Other Reaction(s): Reaction:GI system trouble is able to take orally    " "Amitriptyline Unspecified     Nightmares      Ativan [Lorazepam] Unspecified     Nightmares    Betamethasone Unspecified     10/21/2022 - patient unable to verify allergy/reaction  No historical reaction listed  Patient is able to tollerate prednesone 4/24/2025.     Fentanyl Unspecified     \"Patches didn't work\" and skin broke down    Lyrica [Pregabalin] Nausea          Methadone Unspecified     \"Didn't work\"    Potassium Unspecified     Notes: In IV only  REACTION NOT SPECIFIED    Tizanidine Unspecified     10/21/2022 - patient unable to verify allergy/reaction  No historical reaction listed     "

## 2025-05-19 NOTE — PROGRESS NOTES
1843- Arrived to PACU. Report received from anesthesia/OR circulator. Patient care assumed. Dressings: Surgical site CDI. No redness, warmth or swelling noted.  Sleeping, respirations spontaneous OPA removed by anesthesia on arrival.      1900- Pt reports 7/10 pain, denies nausea. Medicated per MAR. Pt reports recent difficulty swallowing, declines PO medication at this time. Tolerating secretions without difficulty.     1913- Pt reports 7/10 pain continues, medicated per MAR. Called and updated pts family.     1928- Pt reporting 5/10 pain, medicated per MAR. Pt still declines oral medication.    1943- Report given to CAMERON Hester. Pt transported to tele on [facility]. Patient states good pain control. Denies any n/v. VSS. A&Ox4.

## 2025-05-19 NOTE — PROGRESS NOTES
Patient arrived to Tele unit at 1921. Assumed care of patient. Patient walked from rNettie to bed. Patient pain currently at tolerable level. Bed locked in lowest position, alarm on, floor clear of hazards. Patient requesting food, given at this time.

## 2025-05-19 NOTE — CARE PLAN
The patient is Stable - Low risk of patient condition declining or worsening    Shift Goals  Clinical Goals: Pain management, ABX, discharge  Patient Goals: Pain control  Family Goals: madina    Progress made toward(s) clinical / shift goals:      Problem: Knowledge Deficit - Standard  Goal: Patient and family/care givers will demonstrate understanding of plan of care, disease process/condition, diagnostic tests and medications  Outcome: Progressing  Note: Patient updated on POC, all questions answered.     Problem: Urinary Elimination:  Goal: Ability to achieve a balanced intake and output will improve  Outcome: Progressing  Note: Patient drinking and eating adequately.       Patient is not progressing towards the following goals:

## 2025-05-19 NOTE — CARE PLAN
The patient is Watcher - Medium risk of patient condition declining or worsening    Shift Goals  Clinical Goals: Post op vitals, safety, skin check  Patient Goals: comfort  Family Goals: madina    Progress made toward(s) clinical / shift goals: VSS. Patient ambulating well up to bathroom. Performing regular ADLs and at ease. Unable to sleep most of the night. Urinating. Pain treated with medication. Free of falls.      Problem: Pain - Standard  Goal: Alleviation of pain or a reduction in pain to the patient’s comfort goal  Outcome: Progressing     Problem: Knowledge Deficit - Standard  Goal: Patient and family/care givers will demonstrate understanding of plan of care, disease process/condition, diagnostic tests and medications  Outcome: Progressing     Problem: Fall Risk  Goal: Patient will remain free from falls  Outcome: Progressing     Problem: Urinary Elimination:  Goal: Ability to achieve and maintain adequate renal perfusion and functioning will improve  Outcome: Progressing     Problem: Psychosocial Needs:  Goal: Level of anxiety will decrease  Outcome: Progressing       Patient is not progressing towards the following goals:

## 2025-05-19 NOTE — OP REPORT
SURGEON: Dr. Chloe Narayan      ANESTHESIA: General (general endotracheal tube)      PRE-OPERATIVE DIAGNOSIS: right ureteral stone    POST-OPERATIVE DIAGNOSIS: Same      NAME OF PROCEDURE: Cystoscopy, right  [id number]     FINDINGS OF PROCEDURE: purulent drainage after placement of wire and stent. Good proximal and distal curl noted on fluoroscopy and direct inspection.     EBL: 0 cc      COMPLICATIONS: None      PATIENT CONDITION: stable      INDICATIONS: Jana Overton is a 69 y.o. female who agreed to above procedure for further management of kidney stones after complete discussion of risks, benefits, and alternatives.      PROCEDURE:     After informed consent was obtained in the preoperative care unit, the patient was taken to the OR on a stretcher. The patient was properly identified and placed in supine position per OR protocol. The patient was given a prophylactic dose of ancef 2 grams. General (general endotracheal tube) was administered. The patient was then placed in dorsal lithotomy, prepped and draped in a standard sterile fashion.  A timeout was performed with all parties in agreement.       A 22Fr rigid cystoscope was inserted per urethra. A sensor wire was passed into the right ureteral orifice up to the level of the kidney, confirmed under fluoroscopy.     A [id number] ureteral stent was passed over the sensor wire and into the kidney, with it’s position confirmed under fluoroscopic guidance. The wire was removed and a good proximal and distal curl were noted in the renal pelvis and bladder respectively with fluoroscopy. I drained the patient's bladder. This concluded the procedure. The patient tolerated it well and was transferred to the PACU in stable condition.        DISPOSITION: The patient will be observed overnight with plan for definitive stone management as an outpatient.

## 2025-05-19 NOTE — ANESTHESIA PROCEDURE NOTES
Airway    Date/Time: 5/18/2025 6:20 PM    Performed by: Tobey Gansert, M.D.  Authorized by: Tobey Gansert, M.D.    Location:  OR  Urgency:  Elective  Indications for Airway Management:  Anesthesia      Spontaneous Ventilation: absent    Sedation Level:  Deep  Preoxygenated: Yes    Final Airway Type:  Supraglottic airway  Final Supraglottic Airway:  Standard LMA    SGA Size:  3  Number of Attempts at Approach:  1

## 2025-05-19 NOTE — RESPIRATORY CARE
"   COPD EDUCATION by COPD CLINICAL EDUCATOR  5/19/2025 at 7:08 AM by Georgette Gao RRT     Patient reviewed by COPD education team. Patient does not have a history or diagnosis of COPD and is a non-smoker.  Therefore, patient does not qualify for the COPD program.    COPD Screen  COPD Risk Screening  Do you have a history of COPD?: No  COPD Population Screener  During the past 4 weeks, how much did you feel short of breath?: None/Little of the time  Do you ever cough up any mucus or phlegm?: No/only with occasional colds or infections  In the past 12 months, you do less than you used to because of your breathing problems: Disagree/unsure  Have you smoked at least 100 cigarettes in your entire life?: No/don't know  How old are you?: 60+  COPD Screening Score: 2    COPD Assessment  COPD Clinical Specialists ONLY  COPD Education Initiated: No--Quick Screen (Per Dr. Tinoco note 11/9/22 - Pt does have chronic asthma which has been reasonably controlled with albuterol. NO PFT,  however noted in chart - Moderate persistent asthma without complication, takes Xopenex.)    PFT Results    No results found for: \"PFT\"    Meds to Beds  [facility] provides bedside medication delivery for all eligible patients at discharge and you have been automatically enrolled in the Meds to Beds Program. Would you like to opt out of this program for any reason?: No - Stay Opted In     MY COPD ACTION PLAN     It is recommended that patients and physicians/healthcare providers complete this action plan together. This plan should be discussed at each physician visit and updated as needed.    The green, yellow and red zones show groups of symptoms of COPD. This list of symptoms is not comprehensive, and you may experience other symptoms. In the \"Actions\" column, your healthcare provider has recommended actions for you to take based on your symptoms.    Patient Name: Jana Overton   YOB: 1955   Last Updated on: 12/5/2024  1:30 " "PM   Green Zone:  I am doing well today Actions     Usual activitiy and exercise level   Take daily medications     Usual amounts of cough and phlegm/mucus   Use oxygen as prescribed     Sleep well at night   Continue regular exercise/diet plan     Appetite is good   At all times avoid cigarette smoke, inhaled irritants     Daily Medications (these medications are taken every day):                Yellow Zone:  I am having a bad day or a COPD flare Actions     More breathless than usual   Continue daily medications     I have less energy for my daily activities   Use quick relief inhaler as ordered     Increased or thicker phlegm/mucus   Use oxygen as prescribed     Using quick relief inhaler/nebulizer more often   Get plenty of rest     Swelling of ankles more than usual   Use pursed lip breathing     More coughing than usual   At all times avoid cigarette smoke, inhaled irritants     I feel like I have a \"chest cold\"     Poor sleep and my symptoms woke me up     My appetite is not good     My medicine is not helping      Call provider immediately if symptoms don’t improve     Continue daily medications, add rescue medications:               Medications to be used during a flare up, (as Discussed with Provider):              Red Zone:  I need urgent medical care Actions     Severe shortness of breath even at rest   Call 911 or seek medical care immediately     Not able to do any activity because of breathing      Fever or shaking chills      Feeling confused or very drowsy       Chest pains      Coughing up blood                  "

## 2025-05-19 NOTE — H&P
Urology History & Physical Note    Date  5/18/2025    Primary Care Physician  Chase Charles D.O.    CC  Right ureteral stone    HPI  This is a 69 y.o. female who presented with severe RLQ pain, fever, chills. Patient noted to have 5 mm distal right ureteral stone on CT renal colic. UA positive. This is her second stone, last one was years ago. Patient has chron's with ileostomy.    Past Medical History[1]    Past Surgical History[2]    Current Medications[3]    Social History     Socioeconomic History    Marital status:      Spouse name: Not on file    Number of children: Not on file    Years of education: Not on file    Highest education level: Associate degree: academic program   Occupational History    Not on file   Tobacco Use    Smoking status: Never     Passive exposure: Never    Smokeless tobacco: Never    Tobacco comments:     second hand smoke parents - smoked for only 1 year many years ago   Vaping Use    Vaping status: Former    Substances: THC   Substance and Sexual Activity    Alcohol use: Not Currently     Alcohol/week: 0.6 oz     Types: 1 Shots of liquor per week     Comment: gin and half a lime; tonic water    Drug use: Not Currently     Types: Marijuana, Inhaled     Comment: medical marijuana through bong/vape    Sexual activity: Not on file     Comment:    Other Topics Concern    Not on file   Social History Narrative    Not on file     Social Drivers of Health     Financial Resource Strain: Unknown (1/15/2023)    Overall Financial Resource Strain (CARDIA)     Difficulty of Paying Living Expenses: Patient declined   Food Insecurity: No Food Insecurity (5/18/2025)    Hunger Vital Sign     Worried About Running Out of Food in the Last Year: Never true     Ran Out of Food in the Last Year: Never true   Transportation Needs: No Transportation Needs (5/18/2025)    PRAPARE - Transportation     Lack of Transportation (Medical): No     Lack of Transportation (Non-Medical): No   Physical  Activity: Sufficiently Active (1/15/2023)    Exercise Vital Sign     Days of Exercise per Week: 7 days     Minutes of Exercise per Session: 60 min   Stress: No Stress Concern Present (11/4/2021)    [employer] - Occupational Stress Questionnaire     Feeling of Stress : Not at all   Social Connections: Socially Integrated (1/15/2023)    Social Connection and Isolation Panel [NHANES]     Frequency of Communication with Friends and Family: Three times a week     Frequency of Social Gatherings with Friends and Family: Once a week     Attends [employer] Services: More than 4 times per year     Active Member of Clubs or Organizations: Yes     Attends Club or Organization Meetings: More than 4 times per year     Marital Status:    Intimate Partner Violence: Not At Risk (5/18/2025)    Humiliation, Afraid, Rape, and Kick questionnaire     Fear of Current or Ex-Partner: No     Emotionally Abused: No     Physically Abused: No     Sexually Abused: No   Housing Stability: Low Risk  (5/18/2025)    Housing Stability Vital Sign     Unable to Pay for Housing in the Last Year: No     Number of Times Moved in the Last Year: 0     Homeless in the Last Year: No       Family History   Problem Relation Age of Onset    Heart Disease Mother         Angina, MI later in life    Lung Disease Mother         copd    Diabetes Father     Heart Disease Father     Diabetes Sister     Cancer Maternal Aunt 40        breast       Allergies  Amoxicillin, Demerol hcl [meperidine], Doxycycline, Methotrexate, Morphine, Promethazine, Remicade [infliximab], Sudafed [pseudoephedrine], Azathioprine sodium, Carafate [sucralfate], Other drug, Sulfa drugs, Sulfasalazine, Gabapentin, Nitroglycerin, Prednisone, Amitriptyline, Ativan [lorazepam], Betamethasone, Fentanyl, Lyrica [pregabalin], Methadone, Potassium, and Tizanidine    Review of Systems  Negative    Physical Exam  Constitutional:       Appearance: Normal appearance.  "  HENT:      Head: Normocephalic and atraumatic.      Mouth/Throat:      Mouth: Mucous membranes are moist.   Pulmonary:      Effort: Pulmonary effort is normal.   Skin:     General: Skin is warm.   Neurological:      General: No focal deficit present.      Mental Status: She is alert.   Psychiatric:         Mood and Affect: Mood normal.         Behavior: Behavior normal.         Vital Signs  Blood Pressure : 131/56   Temperature: 37.3 °C (99.2 °F)   Pulse: 82   Respiration: 16   Pulse Oximetry: 96 %       Labs:  Recent Labs     05/16/25  1150 05/18/25  1134   WBC 8.5 10.7   RBC 4.08* 4.67   HEMOGLOBIN 12.3 14.2   HEMATOCRIT 38.1 43.8   MCV 93.4 93.8   MCH 30.1 30.4   MCHC 32.3 32.4   RDW 43.0 44.0   PLATELETCT 189 242   MPV 10.2 10.3     Recent Labs     05/18/25  1134   SODIUM 141   POTASSIUM 4.3   CHLORIDE 104   CO2 21   GLUCOSE 107*   BUN 23*   CREATININE 1.62*   CALCIUM 10.2     Recent Labs     05/16/25  1150   INR 0.99     Recent Labs     05/16/25  1150 05/18/25  1134   ASTSGOT  --  37   ALTSGPT  --  30   TBILIRUBIN  --  0.5   ALKPHOSPHAT  --  110*   GLOBULIN  --  3.2   INR 0.99  --        Radiology:  CT-RENAL COLIC EVALUATION(A/P W/O)   Final Result      1.  There is a 5 mm stone in the distal right ureter with moderate right-sided hydroureteronephrosis.   2.  Atherosclerosis.   3.  Status post colectomy with left abdominal ileostomy.      [id number] VIEW    (Results Pending)   DX-PORTABLE FLUOROSCOPY < 1 HOUR Reason For Exam: RLQ PAIN (That radiates into the back, pt has hx of kidney stones   Pt states that she had a procedure on Friday \"an injection into my spinal\" ), IRREGULAR HEART BEAT (Pt has history of Afib but is...    (Results Pending)         Assessment/Plan:  Right ureteral stone  CYSTOSCOPY, WITH right URETERAL STENT INSERTION  -NPO for OR, okay for diet after  -continue broad spectrum antibiotics given systemic symptoms, culture pending  -will need definitive stone management once infection is " treated  -patient currently admitted to medicine for observation         [1]   Past Medical History:  Diagnosis Date    Anesthesia     Difficult IV stick    Anxiety     Arthritis     all over    ASTHMA     has prn inhaler; does not use very often per pt    Atrial fib/flut     Backpain     Bronchitis     5 years    Chronic pain     Colostomy in place ([facility])     COPD (chronic obstructive pulmonary disease) ([facility])     Crohn's disease of colon ([facility])     Dyspnea     History of cardiac murmur     Ileostomy present ([facility])     Infectious disease     MRSA, VancoRSA    Multiple falls     Narcotic dependence ([facility])     Obstruction     Pain     Pneumonia     child and mid 30's    Postherpetic neuralgia     Rosacea     Sleep apnea    [2]   Past Surgical History:  Procedure Laterality Date    SD UPPER GI ENDOSCOPY,DIAGNOSIS N/A 12/7/2024    Procedure: EGD WITH BOTOX INJECTION;  Surgeon: Stuart Payan D.O.;  Location: SURGERY [facility];  Service: Gastroenterology    SD UPPER GI ENDOSCOPY,DIAGNOSIS N/A 2/8/2023    Procedure: GASTROSCOPY;  Surgeon: Jamarcus Davalos M.D.;  Location: SURGERY SAME DAY [facility];  Service: Gastroenterology    SD UPPER GI ENDOSCOPY,W/DILAT,GASTRIC OUT N/A 2/8/2023    Procedure: GASTROSCOPY, WITH BALLOON DILATION;  Surgeon: Jamarcus Davalos M.D.;  Location: SURGERY SAME DAY [facility];  Service: Gastroenterology    SD UPPER GI ENDOSCOPY,BIOPSY N/A 2/8/2023    Procedure: GASTROSCOPY, WITH BIOPSY;  Surgeon: Jamarcus Davalos M.D.;  Location: SURGERY SAME DAY [facility];  Service: Gastroenterology    SD UPPER GI ENDOSCOPY,DIAGNOSIS N/A 1/16/2023    Procedure: GASTROSCOPY;  Surgeon: Jamarcus Davalos M.D.;  Location: SURGERY SAME DAY [facility];  Service: Gastroenterology    SD UPPER GI ENDOSCOPY,W/DILAT,GASTRIC OUT N/A 1/16/2023    Procedure: GASTROSCOPY, WITH BALLOON DILATION;  Surgeon: Jamarcus Davalos M.D.;  Location: SURGERY SAME DAY [facility];  Service: Gastroenterology    SD UPPER GI ENDOSCOPY,BIOPSY N/A 1/16/2023     Procedure: GASTROSCOPY, WITH BIOPSY;  Surgeon: Jamarcus Davalos M.D.;  Location: SURGERY SAME DAY [facility];  Service: Gastroenterology    HI UPPER GI ENDOSCOPY,DIAGNOSIS N/A 10/24/2022    Procedure: GASTROSCOPY;  Surgeon: Jamarcus Davalos M.D.;  Location: SURGERY SAME DAY [facility];  Service: Gastroenterology    BILIARY DILATATION N/A 10/24/2022    Procedure: DILATION, STRICTURE, BILE DUCT;  Surgeon: Jamarcus Davalos M.D.;  Location: SURGERY SAME DAY [facility];  Service: Gastroenterology    HI CYSTOSCOPY,INSERT URETERAL STENT Right 12/23/2021    Procedure: CYSTOSCOPY, WITH URETERAL STENT EXCHANGE;  Surgeon: Jonathan Turcios M.D.;  Location: [facility];  Service: Urology    HI CYSTO/URETERO/PYELOSCOPY, DX Right 12/23/2021    Procedure: URETEROSCOPY;  Surgeon: Jonathan Turcios M.D.;  Location: [facility];  Service: Urology    HI CYSTO/URETERO/PYELOSCOPY, DX Right 12/8/2021    Procedure: URETEROSCOPY;  Surgeon: Luc Hook M.D.;  Location: [facility];  Service: Urology    CYSTOSCOPY WITH URETERAL STENT INSERTION OR REMOVAL Right 12/8/2021    Procedure: CYSTOSCOPY, WITH URETERAL STENT INSERTION;  Surgeon: Luc Hook M.D.;  Location: [facility];  Service: Urology    ILEOSTOMY  1/20/2021    Procedure: ILEOSTOMY- COMPLEX REVISION,;  Surgeon: Kevin Cruz M.D.;  Location: [facility];  Service: General    LYSIS ADHESIONS GENERAL  1/20/2021    Procedure: LYSIS, ADHESIONS;  Surgeon: Kevin Cruz M.D.;  Location: [facility];  Service: General    GASTROSCOPY N/A 5/22/2019    Procedure: GASTROSCOPY - WITH DILATION;  Surgeon: Dionicio Cardona M.D.;  Location: [facility];  Service: Gastroenterology    GASTROSCOPY  1/6/2019    Procedure: GASTROSCOPY- WITH DILATION;  Surgeon: Daniel Daniels M.D.;  Location: [facility];  Service: Gastroenterology    ILEOSTOMY  12/29/2018    Procedure: ILEOSTOMY- REVISION;  Surgeon: Kevin Cruz M.D.;   Location: SURGERY [facility];  Service: General    SIGMOIDOSCOPY FLEX  12/29/2018    Procedure: SIGMOIDOSCOPY FLEX;  Surgeon: Kevin Cruz M.D.;  Location: SURGERY [facility];  Service: General    EXPLORATORY LAPAROTOMY  12/29/2018    Procedure: EXPLORATORY LAPAROTOMY, lysis of adhesions;  Surgeon: Kevin Cruz M.D.;  Location: SURGERY [facility];  Service: General    ILEOSTOMY  5/14/2014    Performed by Kevin Cruz M.D. at SURGERY [facility]    MAMMOPLASTY REDUCTION  7/17/2013    Performed by Janey Burt M.D. at SURGERY [facility]    HARDWARE REMOVAL NEURO  7/16/2012    Performed by KEELEY KIM at SURGERY [facility]    GASTROSCOPY  10/4/2011    Performed by TYSON MARTINEZ at SURGERY [facility]    COLONOSCOPY  10/4/2011    Performed by TYSON MARTINEZ at [facility]ERY [facility]    DILATION AND CURETTAGE  9/24/2010    Performed by GAURAV ALY at SURGERY SAME DAY [facility]    ILEOSTOMY  11/11/2009    Performed by KEVIN CRUZ at SURGERY [facility]    GASTROSCOPY WITH BIOPSY  11/22/08    Performed by NEGRITA HUYNH at SURGERY [facility]    ABDOMINAL EXPLORATION      CERVICAL DISK AND FUSION ANTERIOR      COLON RESECTION      FOOT SURGERY      HAND SURGERY      LUMBAR FUSION ANTERIOR      LUMPECTOMY      OTHER ABDOMINAL SURGERY      surgery for chrons disease    CT BREAST REDUCTION     [3]   Current Facility-Administered Medications   Medication Dose Route Frequency Provider Last Rate Last Admin    diazePAM (Valium) tablet 5 mg  5 mg Oral Q6HRS PRN Vahid Torres M.D.        [START ON 5/19/2025] hydroxychloroquine (Plaquenil) tablet 200 mg  200 mg Oral DAILY Vahid Torres M.D.        levalbuterol (Xopenex) 0.63 MG/3ML nebulizer solution 0.63 mg  3 mL Inhalation Q4HRS PRN Vahid Torres M.D.        metoprolol tartrate (Lopressor) tablet 50 mg  50 mg Oral BID Vahid Torres M.D.        Respiratory Therapy Consult   Nebulization  Continuous RT Vahid Torres M.D.        NS infusion   Intravenous Continuous Vahid Torres M.D. 100 mL/hr at 05/18/25 1709 New Bag at 05/18/25 1709    acetaminophen (Tylenol) tablet 650 mg  650 mg Oral Q6HRS PRN Vahid Torres M.D.        oxyCODONE immediate-release (Roxicodone) tablet 5 mg  5 mg Oral Q3HRS PRN Vahid Torres M.D.        Or    oxyCODONE immediate release (Roxicodone) tablet 10 mg  10 mg Oral Q3HRS PRN Vahid Torres M.D.        Or    HYDROmorphone (Dilaudid) injection 0.5 mg  0.5 mg Intravenous Q3HRS PRN Vahid Torres M.D.   0.5 mg at 05/18/25 1702    senna-docusate (Pericolace Or Senokot S) 8.6-50 MG per tablet 2 Tablet  2 Tablet Oral Q EVENING Vahid Torres M.D.        And    polyethylene glycol/lytes (Miralax) Packet 1 Packet  1 Packet Oral QDAY PRN Vahid Torres M.D.        labetalol (Normodyne/Trandate) injection 10 mg  10 mg Intravenous Q4HRS PRN Vahid Torres M.D.        ondansetron (Zofran) syringe/vial injection 4 mg  4 mg Intravenous Q4HRS PRN Vahid Torres M.D.   4 mg at 05/18/25 1703    Or    ondansetron (Zofran ODT) dispertab 4 mg  4 mg Oral Q4HRS PRN Vahid Torres M.D.        [START ON 5/19/2025] cefTRIAXone (Rocephin) 1,000 mg in  mL IVPB  1,000 mg Intravenous Daily-1400 Vahid Torres M.D.

## 2025-05-19 NOTE — ANESTHESIA POSTPROCEDURE EVALUATION
Patient: Jana Overton    Procedure Summary       Date: 05/18/25 Room / Location:  OR  / SURGERY [facility]    Anesthesia Start: 1817 Anesthesia Stop: 1844    Procedure: CYSTOSCOPY, WITH URETERAL STENT INSERTION (Right: Ureter) Diagnosis: (right ureteral stone)    Surgeons: Chloe Narayan M.D. Responsible Provider: Tobey Gansert, M.D.    Anesthesia Type: general ASA Status: 2 - Emergent            Final Anesthesia Type: general  Last vitals  BP   Blood Pressure : (!) 140/68    Temp   36.8 °C (98.2 °F)    Pulse   85   Resp   16    SpO2   96 %      Anesthesia Post Evaluation    Patient location during evaluation: PACU  Patient participation: complete - patient participated  Level of consciousness: awake and alert    Airway patency: patent  Anesthetic complications: no  Cardiovascular status: hemodynamically stable  Respiratory status: acceptable  Hydration status: euvolemic    PONV: none          No notable events documented.     Nurse Pain Score: 8 (NPRS)

## 2025-05-19 NOTE — PROGRESS NOTES
4 Eyes Skin Assessment Completed by CAMERON Hester and CAMERON Nazario.    Head Blanching and Redness  Ears Redness and Blanching  Nose WDL  Mouth WDL  Neck WDL  Breast/Chest WDL  Shoulder Blades WDL  Spine Redness and Blanching  (R) Arm/Elbow/Hand Redness, Blanching, Bruising, and Discoloration  (L) Arm/Elbow/Hand Redness, Blanching, Bruising, and Scab, burn on hand    Abdomen Ileostomy  Groin WDL  Scrotum/Coccyx/Buttocks Redness and Blanching  (R) Leg Redness, Blanching, Scab, and Bruising  (L) Leg Redness, blanching, bruising    (R) Heel/Foot/Toe Redness and Blanching, calloused  (L) Heel/Foot/Toe Redness and Blanching, calloused          Devices In Places Blood Pressure Cuff and Pulse Ox      Interventions In Place Pillows    Possible Skin Injury Yes    Pictures Uploaded Into Epic Yes  Wound Consult Placed N/A  RN Wound Prevention Protocol Ordered No

## 2025-05-19 NOTE — PROGRESS NOTES
"1230- Patient experienced severe episode of dysphagia accompanied by choking and vomiting. Patient explained it was achalasia spasms. Staff assist called, MD called to bedside. IV valium and GI cocktail ordered. Valium given, GI cocktail refused by patient. Patient stable at this time.    1530- Patient refused to attempt to swallow anything PO and refused to stay in the hospital. MD notified and stated the patient needs to tolerate PO liquids before going home. RN attempted to explain safety risks if unable to tolerate anything PO. Patient grew quite upset, and stated she understood the risks because she has been \"dealing with this for so long,\" and would still like to go home. Patient leaving AMA at this time.  "

## 2025-05-19 NOTE — ANESTHESIA TIME REPORT
Anesthesia Start and Stop Event Times       Date Time Event    5/18/2025 1809 Ready for Procedure     1817 Anesthesia Start     1844 Anesthesia Stop          Responsible Staff  05/18/25      Name Role Begin End    Tobey Gansert, M.D. Anesth 1817 1844          Overtime Reason:  no overtime (within assigned shift)    Comments:

## 2025-05-19 NOTE — ANESTHESIA PREPROCEDURE EVALUATION
Case: [id number] Date/Time: 05/18/25 7935    Procedure: CYSTOSCOPY, WITH URETERAL STENT INSERTION (Right)    Location:  OR 02 / SURGERY [facility]    Surgeons: Chloe Narayan M.D.            Relevant Problems   ANESTHESIA   (positive) Sleep apnea      PULMONARY   (positive) COPD (chronic obstructive pulmonary disease) (HCC)      NEURO   (positive) Migraine      CARDIAC   (positive) Atrial fibrillation ([facility])   (positive) Chronic diastolic congestive heart failure ([facility])   (positive) Essential hypertension   (positive) Migraine      GI   (positive) GERD (gastroesophageal reflux disease)         (positive) FLORES (acute kidney injury) ([facility])   (positive) Chronic kidney disease, stage 3b   (positive) Nephrolithiasis      Other   (positive) Inflammatory arthritis   (positive) Primary osteoarthritis of both hands       Physical Exam    Airway   Mallampati: II  TM distance: >3 FB  Neck ROM: full       Cardiovascular - normal exam  Rhythm: regular  Rate: normal    (-) murmur     Dental - normal exam           Pulmonary - normal examBreath sounds clear to auscultation     Abdominal    Neurological - normal exam                   Anesthesia Plan    ASA 2- EMERGENT   ASA physical status emergent criteria: compromised vital organ, limb or tissue    Plan - general       Airway plan will be LMA          Induction: intravenous    Postoperative Plan: Postoperative administration of opioids is intended.    Pertinent diagnostic labs and testing reviewed    Informed Consent:    Anesthetic plan and risks discussed with patient.    Use of blood products discussed with: patient whom consented to blood products.

## 2025-05-20 ENCOUNTER — TELEPHONE (OUTPATIENT)
Dept: SURGERY | Facility: MEDICAL CENTER | Age: 70
End: 2025-05-20
Payer: MEDICARE

## 2025-05-20 ENCOUNTER — PATIENT OUTREACH (OUTPATIENT)
Dept: MEDICAL GROUP | Facility: LAB | Age: 70
End: 2025-05-20
Payer: MEDICARE

## 2025-05-20 LAB
BACTERIA UR CULT: ABNORMAL
BACTERIA UR CULT: ABNORMAL
SIGNIFICANT IND 70042: ABNORMAL
SITE SITE: ABNORMAL
SOURCE SOURCE: ABNORMAL

## 2025-05-20 NOTE — PROGRESS NOTES
Transitional Care Management  TCM Outreach Date and Time: Filed (5/20/2025 10:33 AM)    Discharge Questions  Actual Discharge Date: 05/19/25  Now that you are home, how are you feeling?: Poor (Pt conts to be in pain and states she is very upset about her care at [facility]. She understands when to return to ED. She is tearful during phone call explaining to this writer that she was not given the correct diet and her over all care was not great.)  Did you receive any new prescriptions?: Yes (Start:  Cefzil        Change: Roxicodone       Stop: Potassium, Mycostatin, levalbuterol 45 MCG/ACT inhaler & Lasix)  Were you able to get them filled?: Yes  Meds to Bed or Pharmacy filled?: Pharmacy  Do you have any questions about your current medications or new medications (Review Med Rec)?: Yes (Please explain)  Did you have any durable medical equipment ordered?: No  Do you have a follow up appointment scheduled with your PCP?: Yes  Appointment Date: 05/29/25  Appointment Time: 1540  Any issues or paperwork you wish to discuss with your PCP?: No  Are you (patient) able to get to the appointment?: Yes  If Home Health was ordered, have they contacted you (Patient): Not Applicable  Did you have enough support after your last discharge?: -- (Unsure)  Does this patient qualify for the CCM program?: No    Transitional Care  Number of attempts made to contact patient: 1  Current or previous attempts completed within two business days of discharge? : Yes  Provided education regarding treatment plan, medications, self-management, ADLs?: Yes (Pt given education to return to ED for unmanagable pain or worsening symptoms.)  Has patient completed an Advanced Directive?: No  Is there anything else I can help you with?: Yes (Please specify) (Pt given information to have her records sent to another facility)    Discharge Summary  Chief Complaint: RLQ Pain & Irregular Heart Beat  Admitting Diagnosis: Nephrolithiasis  Discharge Diagnosis:  Nephrolithiasis      Tasha Rubi R.N.

## 2025-05-21 ENCOUNTER — TELEPHONE (OUTPATIENT)
Dept: MEDICAL GROUP | Facility: LAB | Age: 70
End: 2025-05-21
Payer: MEDICARE

## 2025-05-21 ENCOUNTER — APPOINTMENT (OUTPATIENT)
Dept: ADMISSIONS | Facility: MEDICAL CENTER | Age: 70
End: 2025-05-21
Attending: STUDENT IN AN ORGANIZED HEALTH CARE EDUCATION/TRAINING PROGRAM
Payer: MEDICARE

## 2025-05-21 ENCOUNTER — TELEPHONE (OUTPATIENT)
Dept: SURGERY | Facility: MEDICAL CENTER | Age: 70
End: 2025-05-21
Payer: MEDICARE

## 2025-05-21 ENCOUNTER — TELEPHONE (OUTPATIENT)
Dept: UROLOGY | Facility: MEDICAL CENTER | Age: 70
End: 2025-05-21
Payer: MEDICARE

## 2025-05-21 DIAGNOSIS — N20.0 NEPHROLITHIASIS: ICD-10-CM

## 2025-05-21 RX ORDER — CEFPROZIL 250 MG/5ML
500 POWDER, FOR SUSPENSION ORAL 2 TIMES DAILY
Qty: 140 ML | Refills: 0 | Status: SHIPPED | OUTPATIENT
Start: 2025-05-21 | End: 2025-05-28

## 2025-05-21 RX ORDER — TAMSULOSIN HYDROCHLORIDE 0.4 MG/1
0.4 CAPSULE ORAL
Qty: 10 CAPSULE | Refills: 0 | Status: SHIPPED | OUTPATIENT
Start: 2025-05-21

## 2025-05-21 NOTE — TELEPHONE ENCOUNTER
----- Message from ALISSA LEMONS sent at 5/21/2025 10:14 AM PDT -----  Regarding: Patient problems  Patient was called to schedule surgery and wanted to know if she can get a call regarding her symptoms of severe pain, and abdominal swelling.

## 2025-05-22 NOTE — TELEPHONE ENCOUNTER
Given Rx for additional antibiotics and tamsulosin. Discussed common stent symptoms, what to expect from surgery and recovery

## 2025-05-23 ENCOUNTER — PRE-ADMISSION TESTING (OUTPATIENT)
Dept: ADMISSIONS | Facility: MEDICAL CENTER | Age: 70
End: 2025-05-23
Attending: STUDENT IN AN ORGANIZED HEALTH CARE EDUCATION/TRAINING PROGRAM
Payer: MEDICARE

## 2025-05-23 DIAGNOSIS — Z01.812 PRE-OPERATIVE LABORATORY EXAMINATION: Primary | ICD-10-CM

## 2025-05-23 LAB
AMORPHOUS CRYSTALS 1764: PRESENT /HPF
APPEARANCE UR: ABNORMAL
BACTERIA #/AREA URNS HPF: ABNORMAL /HPF
BILIRUB UR QL STRIP.AUTO: NEGATIVE
CASTS URNS QL MICRO: ABNORMAL /LPF (ref 0–2)
COLOR UR: YELLOW
EPITHELIAL CELLS 1715: ABNORMAL /HPF (ref 0–5)
GLUCOSE UR STRIP.AUTO-MCNC: NEGATIVE MG/DL
KETONES UR STRIP.AUTO-MCNC: ABNORMAL MG/DL
LEUKOCYTE ESTERASE UR QL STRIP.AUTO: ABNORMAL
MICRO URNS: ABNORMAL
NITRITE UR QL STRIP.AUTO: NEGATIVE
PH UR STRIP.AUTO: 5.5 [PH] (ref 5–8)
PROT UR QL STRIP: 300 MG/DL
RBC # URNS HPF: >100 /HPF (ref 0–2)
RBC UR QL AUTO: ABNORMAL
SP GR UR STRIP.AUTO: 1.02
UROBILINOGEN UR STRIP.AUTO-MCNC: 0.2 EU/DL
WBC #/AREA URNS HPF: ABNORMAL /HPF

## 2025-05-23 PROCEDURE — 87086 URINE CULTURE/COLONY COUNT: CPT

## 2025-05-23 PROCEDURE — 81001 URINALYSIS AUTO W/SCOPE: CPT

## 2025-05-23 NOTE — OR NURSING
LVM on patient's cell x3 with call back # to reschedule.  RN also tried house phone, LVM and 's phone with call back #.  Called all lines due to note in chart that they were having phone issues.

## 2025-05-24 NOTE — PREADMIT AVS NOTE
Current Medications   Medication Instructions    tamsulosin (FLOMAX) 0.4 MG capsule Continue taking medication as prescribed, including morning of procedure     diazePAM (VALIUM) 5 MG Tab As needed medication, may take if needed, including morning of procedure     Cyanocobalamin (B-12 PO) Stop 7 days before surgery    MAGNESIUM PO Stop 7 days before surgery    oxyCODONE immediate release (ROXICODONE) 10 MG immediate release tablet As needed medication, may take if needed, including morning of procedure     ondansetron (ZOFRAN ODT) 4 MG TABLET DISPERSIBLE As needed medication, may take if needed, including morning of procedure     naratriptan (AMERGE) 2.5 MG tablet Hold medication day of procedure    spironolactone (ALDACTONE) 25 MG Tab Continue taking medication as prescribed, including morning of procedure     Ascorbic Acid (VITAMIN C GUMMIE PO) Stop 7 days before surgery    Biotin-Vitamin C (HAIR SKIN NAILS GUMMIES PO) Stop 7 days before surgery    Ca Phosphate-Cholecalciferol (CALCIUM/VITAMIN D3 GUMMIES PO) Stop 7 days before surgery    POTASSIUM CITRATE PO Stop 7 days before surgery    Multiple Vitamins-Minerals (MULTI-VITAMIN GUMMIES PO) Stop 7 days before surgery    Probiotic Product (PROBIOTIC GUMMIES PO) Stop 7 days before surgery    prochlorperazine (COMPAZINE) 10 MG Tab As needed medication, may take if needed, including morning of procedure     Azelaic Acid 15 % Gel Hold medication day of procedure    hydroxychloroquine (PLAQUENIL) 200 MG Tab Follow instructions from surgeon or specialist.    metoprolol tartrate (LOPRESSOR) 50 MG Tab Continue taking medication as prescribed, including morning of procedure

## 2025-05-24 NOTE — OR NURSING
There was confusion and patient was an in-person for preadmit.  Preadmit appointment was completed for patient for procedure on 5/27/25.  Labs completed in person.  Chart/Medications reviewed and instructions given per protocol.  Patient to call MD regarding one medication for instructions.

## 2025-05-26 LAB
BACTERIA UR CULT: NORMAL
SIGNIFICANT IND 70042: NORMAL
SITE SITE: NORMAL
SOURCE SOURCE: NORMAL

## 2025-05-27 ENCOUNTER — APPOINTMENT (OUTPATIENT)
Dept: RADIOLOGY | Facility: MEDICAL CENTER | Age: 70
End: 2025-05-27
Attending: STUDENT IN AN ORGANIZED HEALTH CARE EDUCATION/TRAINING PROGRAM
Payer: MEDICARE

## 2025-05-27 ENCOUNTER — ANESTHESIA EVENT (OUTPATIENT)
Dept: SURGERY | Facility: MEDICAL CENTER | Age: 70
End: 2025-05-27
Payer: MEDICARE

## 2025-05-27 ENCOUNTER — ANESTHESIA (OUTPATIENT)
Dept: SURGERY | Facility: MEDICAL CENTER | Age: 70
End: 2025-05-27
Payer: MEDICARE

## 2025-05-27 ENCOUNTER — HOSPITAL ENCOUNTER (OUTPATIENT)
Facility: MEDICAL CENTER | Age: 70
End: 2025-05-27
Attending: STUDENT IN AN ORGANIZED HEALTH CARE EDUCATION/TRAINING PROGRAM | Admitting: STUDENT IN AN ORGANIZED HEALTH CARE EDUCATION/TRAINING PROGRAM
Payer: MEDICARE

## 2025-05-27 VITALS
OXYGEN SATURATION: 98 % | HEIGHT: 72 IN | WEIGHT: 128.31 LBS | RESPIRATION RATE: 16 BRPM | DIASTOLIC BLOOD PRESSURE: 67 MMHG | BODY MASS INDEX: 17.38 KG/M2 | HEART RATE: 82 BPM | TEMPERATURE: 97.7 F | SYSTOLIC BLOOD PRESSURE: 159 MMHG

## 2025-05-27 LAB — PATHOLOGY CONSULT NOTE: NORMAL

## 2025-05-27 PROCEDURE — 700111 HCHG RX REV CODE 636 W/ 250 OVERRIDE (IP): Performed by: STUDENT IN AN ORGANIZED HEALTH CARE EDUCATION/TRAINING PROGRAM

## 2025-05-27 PROCEDURE — 160025 RECOVERY II MINUTES (STATS): Performed by: STUDENT IN AN ORGANIZED HEALTH CARE EDUCATION/TRAINING PROGRAM

## 2025-05-27 PROCEDURE — 160046 HCHG PACU - 1ST 60 MINS PHASE II: Performed by: STUDENT IN AN ORGANIZED HEALTH CARE EDUCATION/TRAINING PROGRAM

## 2025-05-27 PROCEDURE — 160039 HCHG SURGERY MINUTES - EA ADDL 1 MIN LEVEL 3: Performed by: STUDENT IN AN ORGANIZED HEALTH CARE EDUCATION/TRAINING PROGRAM

## 2025-05-27 PROCEDURE — 160035 HCHG PACU - 1ST 60 MINS PHASE I: Performed by: STUDENT IN AN ORGANIZED HEALTH CARE EDUCATION/TRAINING PROGRAM

## 2025-05-27 PROCEDURE — 700102 HCHG RX REV CODE 250 W/ 637 OVERRIDE(OP): Performed by: STUDENT IN AN ORGANIZED HEALTH CARE EDUCATION/TRAINING PROGRAM

## 2025-05-27 PROCEDURE — 160015 HCHG STAT PREOP MINUTES: Performed by: STUDENT IN AN ORGANIZED HEALTH CARE EDUCATION/TRAINING PROGRAM

## 2025-05-27 PROCEDURE — 160036 HCHG PACU - EA ADDL 30 MINS PHASE I: Performed by: STUDENT IN AN ORGANIZED HEALTH CARE EDUCATION/TRAINING PROGRAM

## 2025-05-27 PROCEDURE — 160009 HCHG ANES TIME/MIN: Performed by: STUDENT IN AN ORGANIZED HEALTH CARE EDUCATION/TRAINING PROGRAM

## 2025-05-27 PROCEDURE — 700105 HCHG RX REV CODE 258: Performed by: STUDENT IN AN ORGANIZED HEALTH CARE EDUCATION/TRAINING PROGRAM

## 2025-05-27 PROCEDURE — 82365 CALCULUS SPECTROSCOPY: CPT

## 2025-05-27 PROCEDURE — 700111 HCHG RX REV CODE 636 W/ 250 OVERRIDE (IP): Mod: JZ

## 2025-05-27 PROCEDURE — 110371 HCHG SHELL REV 272: Performed by: STUDENT IN AN ORGANIZED HEALTH CARE EDUCATION/TRAINING PROGRAM

## 2025-05-27 PROCEDURE — C2617 STENT, NON-COR, TEM W/O DEL: HCPCS | Performed by: STUDENT IN AN ORGANIZED HEALTH CARE EDUCATION/TRAINING PROGRAM

## 2025-05-27 PROCEDURE — 160002 HCHG RECOVERY MINUTES (STAT): Performed by: STUDENT IN AN ORGANIZED HEALTH CARE EDUCATION/TRAINING PROGRAM

## 2025-05-27 PROCEDURE — 160048 HCHG OR STATISTICAL LEVEL 1-5: Performed by: STUDENT IN AN ORGANIZED HEALTH CARE EDUCATION/TRAINING PROGRAM

## 2025-05-27 PROCEDURE — 88300 SURGICAL PATH GROSS: CPT | Performed by: PATHOLOGY

## 2025-05-27 PROCEDURE — A9270 NON-COVERED ITEM OR SERVICE: HCPCS | Performed by: STUDENT IN AN ORGANIZED HEALTH CARE EDUCATION/TRAINING PROGRAM

## 2025-05-27 PROCEDURE — 700101 HCHG RX REV CODE 250: Performed by: STUDENT IN AN ORGANIZED HEALTH CARE EDUCATION/TRAINING PROGRAM

## 2025-05-27 PROCEDURE — 74018 RADEX ABDOMEN 1 VIEW: CPT

## 2025-05-27 PROCEDURE — 88300 SURGICAL PATH GROSS: CPT | Mod: 26 | Performed by: PATHOLOGY

## 2025-05-27 PROCEDURE — 160028 HCHG SURGERY MINUTES - 1ST 30 MINS LEVEL 3: Performed by: STUDENT IN AN ORGANIZED HEALTH CARE EDUCATION/TRAINING PROGRAM

## 2025-05-27 PROCEDURE — 52356 CYSTO/URETERO W/LITHOTRIPSY: CPT | Mod: RT | Performed by: STUDENT IN AN ORGANIZED HEALTH CARE EDUCATION/TRAINING PROGRAM

## 2025-05-27 PROCEDURE — 700111 HCHG RX REV CODE 636 W/ 250 OVERRIDE (IP): Mod: JZ | Performed by: STUDENT IN AN ORGANIZED HEALTH CARE EDUCATION/TRAINING PROGRAM

## 2025-05-27 PROCEDURE — C1769 GUIDE WIRE: HCPCS | Performed by: STUDENT IN AN ORGANIZED HEALTH CARE EDUCATION/TRAINING PROGRAM

## 2025-05-27 DEVICE — STENT UROLOGICAL POLARIS 6X26 ULTRA: Type: IMPLANTABLE DEVICE | Site: URETER | Status: FUNCTIONAL

## 2025-05-27 RX ORDER — ACETAMINOPHEN 500 MG
1000 TABLET ORAL ONCE
Status: DISCONTINUED | OUTPATIENT
Start: 2025-05-27 | End: 2025-05-27

## 2025-05-27 RX ORDER — METHOCARBAMOL 100 MG/ML
500 INJECTION, SOLUTION INTRAMUSCULAR; INTRAVENOUS ONCE
Status: DISCONTINUED | OUTPATIENT
Start: 2025-05-27 | End: 2025-05-27 | Stop reason: HOSPADM

## 2025-05-27 RX ORDER — ACETAMINOPHEN 10 MG/ML
1000 INJECTION, SOLUTION INTRAVENOUS ONCE
Status: DISCONTINUED | OUTPATIENT
Start: 2025-05-27 | End: 2025-05-27 | Stop reason: HOSPADM

## 2025-05-27 RX ORDER — OXYCODONE HCL 5 MG/5 ML
10 SOLUTION, ORAL ORAL
Status: COMPLETED | OUTPATIENT
Start: 2025-05-27 | End: 2025-05-27

## 2025-05-27 RX ORDER — ACETAMINOPHEN 10 MG/ML
1000 INJECTION, SOLUTION INTRAVENOUS ONCE
Status: COMPLETED | OUTPATIENT
Start: 2025-05-27 | End: 2025-05-27

## 2025-05-27 RX ORDER — PHENYLEPHRINE HCL IN 0.9% NACL 1 MG/10 ML
SYRINGE (ML) INTRAVENOUS PRN
Status: DISCONTINUED | OUTPATIENT
Start: 2025-05-27 | End: 2025-05-27 | Stop reason: SURG

## 2025-05-27 RX ORDER — CEFAZOLIN SODIUM 1 G/3ML
INJECTION, POWDER, FOR SOLUTION INTRAMUSCULAR; INTRAVENOUS PRN
Status: DISCONTINUED | OUTPATIENT
Start: 2025-05-27 | End: 2025-05-27 | Stop reason: SURG

## 2025-05-27 RX ORDER — HALOPERIDOL 5 MG/ML
1 INJECTION INTRAMUSCULAR
Status: DISCONTINUED | OUTPATIENT
Start: 2025-05-27 | End: 2025-05-27 | Stop reason: HOSPADM

## 2025-05-27 RX ORDER — EPHEDRINE SULFATE 50 MG/ML
5 INJECTION, SOLUTION INTRAVENOUS
Status: DISCONTINUED | OUTPATIENT
Start: 2025-05-27 | End: 2025-05-27 | Stop reason: HOSPADM

## 2025-05-27 RX ORDER — LIDOCAINE HYDROCHLORIDE 20 MG/ML
INJECTION, SOLUTION EPIDURAL; INFILTRATION; INTRACAUDAL; PERINEURAL PRN
Status: DISCONTINUED | OUTPATIENT
Start: 2025-05-27 | End: 2025-05-27 | Stop reason: SURG

## 2025-05-27 RX ORDER — OXYCODONE HCL 5 MG/5 ML
5 SOLUTION, ORAL ORAL
Status: COMPLETED | OUTPATIENT
Start: 2025-05-27 | End: 2025-05-27

## 2025-05-27 RX ORDER — DEXAMETHASONE SODIUM PHOSPHATE 4 MG/ML
INJECTION, SOLUTION INTRA-ARTICULAR; INTRALESIONAL; INTRAMUSCULAR; INTRAVENOUS; SOFT TISSUE PRN
Status: DISCONTINUED | OUTPATIENT
Start: 2025-05-27 | End: 2025-05-27 | Stop reason: SURG

## 2025-05-27 RX ORDER — SODIUM CHLORIDE, SODIUM LACTATE, POTASSIUM CHLORIDE, CALCIUM CHLORIDE 600; 310; 30; 20 MG/100ML; MG/100ML; MG/100ML; MG/100ML
INJECTION, SOLUTION INTRAVENOUS
Status: DISCONTINUED | OUTPATIENT
Start: 2025-05-27 | End: 2025-05-27 | Stop reason: SURG

## 2025-05-27 RX ORDER — ACETAMINOPHEN 10 MG/ML
INJECTION, SOLUTION INTRAVENOUS
Status: COMPLETED
Start: 2025-05-27 | End: 2025-05-27

## 2025-05-27 RX ORDER — DIPHENHYDRAMINE HYDROCHLORIDE 50 MG/ML
12.5 INJECTION, SOLUTION INTRAMUSCULAR; INTRAVENOUS
Status: DISCONTINUED | OUTPATIENT
Start: 2025-05-27 | End: 2025-05-27 | Stop reason: HOSPADM

## 2025-05-27 RX ADMIN — Medication 100 MCG: at 11:33

## 2025-05-27 RX ADMIN — OXYCODONE HYDROCHLORIDE 10 MG: 5 SOLUTION ORAL at 12:21

## 2025-05-27 RX ADMIN — LIDOCAINE HYDROCHLORIDE 5 MG: 20 INJECTION, SOLUTION EPIDURAL; INFILTRATION; INTRACAUDAL; PERINEURAL at 11:06

## 2025-05-27 RX ADMIN — PROPOFOL 120 MG: 10 INJECTION, EMULSION INTRAVENOUS at 11:23

## 2025-05-27 RX ADMIN — FENTANYL CITRATE 25 MCG: 50 INJECTION, SOLUTION INTRAMUSCULAR; INTRAVENOUS at 12:22

## 2025-05-27 RX ADMIN — Medication 150 MCG: at 11:36

## 2025-05-27 RX ADMIN — Medication 100 MCG: at 11:23

## 2025-05-27 RX ADMIN — DEXAMETHASONE SODIUM PHOSPHATE 4 MG: 4 INJECTION INTRA-ARTICULAR; INTRALESIONAL; INTRAMUSCULAR; INTRAVENOUS; SOFT TISSUE at 11:14

## 2025-05-27 RX ADMIN — LIDOCAINE HYDROCHLORIDE 50 MG: 20 INJECTION, SOLUTION EPIDURAL; INFILTRATION; INTRACAUDAL; PERINEURAL at 11:14

## 2025-05-27 RX ADMIN — CEFAZOLIN 2 G: 1 INJECTION, POWDER, FOR SOLUTION INTRAMUSCULAR; INTRAVENOUS at 11:14

## 2025-05-27 RX ADMIN — ACETAMINOPHEN 1000 MG: 10 INJECTION, SOLUTION INTRAVENOUS at 12:27

## 2025-05-27 RX ADMIN — SODIUM CHLORIDE, POTASSIUM CHLORIDE, SODIUM LACTATE AND CALCIUM CHLORIDE: 600; 310; 30; 20 INJECTION, SOLUTION INTRAVENOUS at 10:59

## 2025-05-27 ASSESSMENT — PAIN SCALES - GENERAL: PAIN_LEVEL: 3

## 2025-05-27 ASSESSMENT — FIBROSIS 4 INDEX: FIB4 SCORE: 2.76

## 2025-05-27 NOTE — OP REPORT
SURGEON: Dr. Jaimie Kyle      ANESTHESIA: General (general endotracheal tube)      PRE-OPERATIVE DIAGNOSIS: right ureteral stone    POST-OPERATIVE DIAGNOSIS: Same      NAME OF PROCEDURE: Cystoscopy, right ureteroscopy with stone basket extraction, physician interpretation of fluoroscopy total time <1 hour, and 6Fr 26 cm JJ ureteral stent placement on a tether    FINDINGS OF PROCEDURE:     Right distal ureteral stone removed with stone basket extraction  Placement of right 6x26 JJ ureteral stent     EBL: 2 cc      COMPLICATIONS: None      PATIENT CONDITION: stable      INDICATIONS: Jana Overton is a 69 y.o. female who agreed to above procedure for further management of kidney stones after complete discussion of risks, benefits, and alternatives.      PROCEDURE:     After informed consent was obtained in the preoperative care unit, the patient was taken to the OR on a stretcher. The patient was properly identified and placed in supine position per OR protocol. The patient was given a prophylactic dose of ancef 2 grams. General (general endotracheal tube) was administered. The patient was then placed in dorsal lithotomy, prepped and draped in a standard sterile fashion.  A timeout was performed with all parties in agreement.       A 22Fr rigid cystoscope was inserted per urethra. A full inspection of the bladder was completed. There were no lesions or masses, the UOs were in orthotopic position effluxing clear urine. Two sensor wires were passed into the right ureteral orifice up to the level of the kidney, confirmed under fluoroscopy.     The long semi-rigid ureteroscope was assembled and was advanced along the urethra and into the bladder under direct vision. This was advanced into the right ureteral orifice up to the level of the stone. The stone basket was used to remove the stone.  The semi-rigid was removed under direct vision to perform a final ureteral survey. No ureteral injuries or remaining stone  fragments were seen. One of the sensor wires was removed.     A [id number] JJ ureteral stent was passed over the remaining sensor wire and into the kidney, with it’s position confirmed under fluoroscopic guidance. The wire was removed and a good proximal and distal curl were noted in the renal pelvis and bladder respectively with fluoroscopy. I drained the patients bladder. This concluded the procedure. The patient tolerated it well and was transferred to the PACU in stable condition.        DISPOSITION: The patient will be discharged home with plan for stent on a tether removal in 3 days.

## 2025-05-27 NOTE — ANESTHESIA POSTPROCEDURE EVALUATION
Patient: Jana Overton    Procedure Summary       Date: 05/27/25 Room / Location: [facility] ROOM 25 / SURGERY SAME DAY [facility]    Anesthesia Start: 1059 Anesthesia Stop: 1203    Procedure: CYSTOSCOPY, RIGHT URETERSCOPY, STENT  PLACEMENT (Right: Ureter) Diagnosis: (NEPHROLITHIASIS)    Surgeons: Jaimie Kyle M.D. Responsible Provider: Elsa Guerra M.D.    Anesthesia Type: general ASA Status: 3            Final Anesthesia Type: general  Last vitals  BP   Blood Pressure : (!) 159/67    Temp   36.5 °C (97.7 °F)    Pulse   82   Resp   16    SpO2   98 %      Anesthesia Post Evaluation    Patient location during evaluation: PACU  Patient participation: complete - patient participated  Level of consciousness: awake and alert  Pain score: 3    Airway patency: patent  Anesthetic complications: no  Cardiovascular status: hemodynamically stable  Respiratory status: acceptable  Hydration status: stable    PONV: none          No notable events documented.     Nurse Pain Score: 3 (NPRS)

## 2025-05-27 NOTE — DISCHARGE INSTRUCTIONS
What to Expect Post Anesthesia    Rest and take it easy for the first 24 hours.  A responsible adult is recommended to remain with you during that time.  It is normal to feel sleepy.  We encourage you to not do anything that requires balance, judgment or coordination.    FOR 24 HOURS DO NOT:  Drive, operate machinery or run household appliances.  Drink beer or alcoholic beverages.  Make important decisions or sign legal documents.    To avoid nausea, slowly advance diet as tolerated, avoiding spicy or greasy foods for the first day.  Add more substantial food to your diet according to your provider's instructions.  Babies can be fed formula or breast milk as soon as they are hungry.  INCREASE FLUIDS AND FIBER TO AVOID CONSTIPATION.    MILD FLU-LIKE SYMPTOMS ARE NORMAL.  YOU MAY EXPERIENCE GENERALIZED MUSCLE ACHES, THROAT IRRITATION, HEADACHE AND/OR SOME NAUSEA.    If any questions arise, call your provider Dr Zuluaga [phone].  If your provider is not available, please feel free to call the Surgical Center at [phone].    MEDICATIONS: Resume taking daily medication.  Take prescribed pain medication with food.  If no medication is prescribed, you may take non-aspirin pain medication if needed.  PAIN MEDICATION CAN BE VERY CONSTIPATING.  Take a stool softener or laxative such as senokot, pericolace, or milk of magnesia if needed.    Last pain medication given at Oxycodone and Tylenol given at 12:30pm, ok for next dose of tylenol after 6:30 pm.

## 2025-05-27 NOTE — ANESTHESIA TIME REPORT
Anesthesia Start and Stop Event Times       Date Time Event    5/27/2025 1027 Ready for Procedure     1059 Anesthesia Start     1203 Anesthesia Stop          Responsible Staff  05/27/25      Name Role Begin End    Elsa Guerra M.D. Anesth 1059 1203          Overtime Reason:  no overtime (within assigned shift)    Comments:

## 2025-05-27 NOTE — OR NURSING
1159 - Pt to PACU 9 from OR. Bedside report from anesthesiologist and RN. Attached to monitoring, VSS, breathing is calm and unlabored. Titrated to room air, pt does report some pain    1230 - Pt was up to bathroom with RN present. VSS, room air, report to Fozia to take over patient care.

## 2025-05-27 NOTE — ANESTHESIA PREPROCEDURE EVALUATION
Case: [id number] Date/Time: 05/27/25 1115    Procedure: CYSTOSCOPY, RIGHT URETERSCOPY, LASER LITHOTRIPSY STENT  PLACEMENT    Pre-op diagnosis: NEPHROLITHIASIS    Location: [facility] ROOM 25 / SURGERY SAME DAY [facility]    Surgeons: Jaimie Kyle M.D.            Relevant Problems   ANESTHESIA   (positive) Sleep apnea      PULMONARY   (positive) COPD (chronic obstructive pulmonary disease) (HCC)      NEURO   (positive) Migraine      CARDIAC   (positive) Atrial fibrillation (HCC)   (positive) Chronic diastolic congestive heart failure ([facility])   (positive) Essential hypertension   (positive) Migraine      GI   (positive) GERD (gastroesophageal reflux disease)         (positive) FLORES (acute kidney injury) ([facility])   (positive) Chronic kidney disease, stage 3b   (positive) Nephrolithiasis      Other   (positive) Inflammatory arthritis   (positive) Primary osteoarthritis of both hands       Physical Exam    Airway   Mallampati: I  TM distance: >3 FB  Neck ROM: full       Cardiovascular - normal exam   Dental - normal exam           Pulmonary    Abdominal    Neurological                Anesthesia Plan    ASA 3       Plan - general       Airway plan will be LMA    (68yo female presenting for cystoscopy, lithotripsy and stent placement. Appropriately NPO. Multiple allergies reviewed - mostly intolerance or side effects. Medical history reviewed, notable for afib, asthma, murmur, severe achalasia - cannot tolerate pills . On chronic benzodiazapine and opioids. ECHO 12/2024: Normal left ventricular systolic function.  The left ventricular ejection fraction is visually estimated to be 65%.  Normal right ventricular size and systolic function.  Mild mitral annular calcification.  )      Induction: intravenous          Informed Consent:    Anesthetic plan and risks discussed with patient.

## 2025-05-27 NOTE — OR NURSING
1230 report from Melia BARNES, assumed pt care. Pt medicated for pain per MAR. Tolerating PO fluids, IV acetaminophen infusing.     1233 called pt spouse Goyo  to update on recovery.     1244 states pain is getting better.       1305 phase 1 recovery complete. Report called to Tamiko BARNES in phase 2. Pt over to phase 2 and up to recliner.

## 2025-05-27 NOTE — ANESTHESIA PROCEDURE NOTES
Airway    Date/Time: 5/27/2025 11:17 AM    Performed by: Elsa Guerra M.D.  Authorized by: Elsa Guerra M.D.    Location:  OR  Urgency:  Elective  Indications for Airway Management:  Anesthesia      Spontaneous Ventilation: absent    Sedation Level:  Deep  Preoxygenated: Yes    Mask Difficulty Assessment:  0 - not attempted  Final Airway Type:  Supraglottic airway  Final Supraglottic Airway:  Standard LMA    SGA Size:  3  Number of Attempts at Approach:  1

## 2025-05-29 ENCOUNTER — APPOINTMENT (OUTPATIENT)
Dept: MEDICAL GROUP | Facility: LAB | Age: 70
End: 2025-05-29
Payer: MEDICARE

## 2025-05-29 VITALS
HEART RATE: 96 BPM | TEMPERATURE: 97.4 F | HEIGHT: 72 IN | BODY MASS INDEX: 17.88 KG/M2 | DIASTOLIC BLOOD PRESSURE: 54 MMHG | RESPIRATION RATE: 14 BRPM | OXYGEN SATURATION: 98 % | SYSTOLIC BLOOD PRESSURE: 116 MMHG | WEIGHT: 132 LBS

## 2025-05-29 DIAGNOSIS — K21.9 GASTROESOPHAGEAL REFLUX DISEASE WITHOUT ESOPHAGITIS: ICD-10-CM

## 2025-05-29 DIAGNOSIS — M54.10 RADICULAR LOW BACK PAIN: ICD-10-CM

## 2025-05-29 DIAGNOSIS — R19.7 DIARRHEA, UNSPECIFIED TYPE: ICD-10-CM

## 2025-05-29 DIAGNOSIS — K26.9: Primary | ICD-10-CM

## 2025-05-29 DIAGNOSIS — K50.019 CROHN'S DISEASE OF SMALL INTESTINE WITH COMPLICATION (HCC): ICD-10-CM

## 2025-05-29 DIAGNOSIS — N28.9 RENAL INSUFFICIENCY: ICD-10-CM

## 2025-05-29 DIAGNOSIS — Z20.9 EXPOSURE TO POTENTIAL INFECTION: ICD-10-CM

## 2025-05-29 DIAGNOSIS — N20.0 NEPHROLITHIASIS: ICD-10-CM

## 2025-05-29 RX ORDER — CEFPROZIL 250 MG/5ML
500 POWDER, FOR SUSPENSION ORAL 2 TIMES DAILY
Qty: 100 ML | Refills: 0 | Status: SHIPPED | OUTPATIENT
Start: 2025-05-29 | End: 2025-06-03

## 2025-05-29 RX ORDER — OXYCODONE HYDROCHLORIDE 10 MG/1
10 TABLET ORAL EVERY 6 HOURS PRN
Qty: 90 TABLET | Refills: 0 | Status: SHIPPED | OUTPATIENT
Start: 2025-05-29 | End: 2025-06-28

## 2025-05-29 ASSESSMENT — FIBROSIS 4 INDEX: FIB4 SCORE: 2.76

## 2025-05-30 ENCOUNTER — OFFICE VISIT (OUTPATIENT)
Dept: UROLOGY | Facility: MEDICAL CENTER | Age: 70
End: 2025-05-30
Payer: MEDICARE

## 2025-05-30 DIAGNOSIS — N20.0 NEPHROLITHIASIS: Primary | ICD-10-CM

## 2025-05-30 LAB
APPEARANCE STONE: NORMAL
COMPN STONE: NORMAL
SPECIMEN WT: 18 MG

## 2025-05-30 NOTE — PROGRESS NOTES
CC: Jana Overton is a 69 y.o. female is suffering from   Chief Complaint   Patient presents with    Hospital Follow-up         SUBJECTIVE:  1. Crohn's disease of small intestine with complication (HCC)  Jana is accompanied by Goyo we have discussed she has Crohn's disease and needs to be on Roxicodone    2. Gastroesophageal reflux disease without esophagitis  History of severe gastroesophageal reflux disease clinically stable    3. Diarrhea, unspecified type  Ongoing diarrhea    4. 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 Radicular low back pain  Back pain secondary to low back dysfunction    6. Renal insufficiency  Renal insufficiency secondary to kidney stone with retained ureteral stent    7. Exposure to potential infection  Patiently traveling to [place] concerns about possible COVID infection    8. Nephrolithiasis  Kidney stone recently removed with basket    History of Present Illness              Past social, family, history: Social History[1]       MEDICATIONS:  Current Medications[2]    PROBLEMS:  Patient Active Problem List    Diagnosis Date Noted    Urinary tract infection 05/19/2025    Nephrolithiasis 05/18/2025    FLORES (acute kidney injury) (HCC) 12/04/2024    Metabolic acidosis 12/04/2024    Increased ostomy output 12/04/2024    Intractable nausea and vomiting 12/04/2024    ACP (advance care planning) 12/04/2024    Easy bruising 12/04/2024    Underweight (BMI < 18.5) 11/27/2024    Thrombocytopenia (HCC) 12/11/2023    Chronic diastolic congestive heart failure (HCC) 12/11/2023    Chronic kidney disease, stage 3b 11/22/2023    Transient alteration of awareness 10/26/2023    Achalasia 10/18/2023    Esophageal candidiasis (HCC) 02/08/2023    Sacroiliac joint pain 11/10/2022    Esophageal stricture 10/22/2022    Dysphagia 10/21/2022    Drug-induced lupus erythematosus 09/27/2022    Long-term use of hydroxychloroquine 09/27/2022    Fibromyalgia syndrome  09/27/2022    Primary osteoarthritis of both hands 07/28/2022    Positive cardiolipin antibodies (IgA and IgG anti-CL) 07/27/2022    Positive VAHID (1:640 homogenous pattern with negative reflex) 07/27/2022    Inflammatory arthritis 07/27/2022    Oropharyngeal dysphagia 06/02/2022    Acute pancreatitis 05/23/2022    Migraine 06/04/2019    Essential hypertension 05/20/2019    SIRS (systemic inflammatory response syndrome) ([facility]) 05/19/2019    History of MRSA infection 12/29/2018    History of infection with vancomycin resistant Enterococcus (VRE) 12/29/2018    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[facility]) 12/28/2018    Anemia 12/15/2018    Dehydration 12/12/2018    Hyponatremia 12/12/2018    Leukocytosis 12/12/2018    Anxiety disorder due to multiple medical problems 12/01/2017    DDD (degenerative disc disease), lumbar 04/12/2017    History of DVT (deep vein thrombosis) 11/23/2016    Atrial fibrillation ([facility]) 03/26/2015    Sleep apnea 10/26/2014    Postherpetic neuralgia 06/24/2014    Multiple falls 06/24/2014    COPD (chronic obstructive pulmonary disease) ([facility]) 06/24/2014    Rosacea 06/24/2014    Ileostomy in place ([facility]) 06/26/2013    GERD (gastroesophageal reflux disease) 12/05/2012    Status post lumbar surgery 07/16/2012    Crohn's disease of small intestine with complication ([facility]) 09/23/2009    Central sensitization to pain 09/23/2009    Opioid dependence ([facility]) 09/23/2009    Degenerative joint disease of cervical and lumbar spine 09/23/2009       REVIEW OF SYSTEMS:  Gen.:  No Nausea, Vomiting, fever, Chills.  Heart: No chest pain.  Lungs:  No shortness of Breath.  Psychological: Rg unusual Anxiety depression     PHYSICAL EXAM   Physical Exam             Constitutional: Alert, cooperative, not in acute distress.  Cardiovascular:  Rate Rhythm is regular without murmurs rubs clicks.     Thorax & Lungs: Clear to auscultation, no wheezing, rhonchi, or rales  HENT: Normocephalic, Atraumatic.  Eyes: PERRLA, EOMI, Conjunctiva normal.    Neck: Trachia is midline no swelling of the thyroid.   Lymphatic: No lymphadenopathy noted.   Neurologic: Alert & oriented x 3, cranial nerves II through XII are intact, Normal motor function, Normal sensory function, No focal deficits noted.   Psychiatric: Affect normal, Judgment normal, Mood normal.     VITAL SIGNS:/54 (BP Location: Left arm, Patient Position: Sitting, BP Cuff Size: Adult)   Pulse 96   Temp 36.3 °C (97.4 °F) (Temporal)   Resp 14   Ht 1.829 m (6')   Wt 59.9 kg (132 lb)   LMP  (LMP Unknown)   SpO2 98%   BMI 17.90 kg/m²     Labs: Reviewed    Assessment:                                                     Plan:  [unfilled]             1. Crohn's disease of small intestine with complication (HCC)  Continue Roxicodone  - oxyCODONE immediate release (ROXICODONE) 10 MG immediate release tablet; Take 1 Tablet by mouth every 6 hours as needed for Moderate Pain for up to 30 days.  Dispense: 90 Tablet; Refill: 0    2. Gastroesophageal reflux disease without esophagitis  Date drug task force reviewed  - oxyCODONE immediate release (ROXICODONE) 10 MG immediate release tablet; Take 1 Tablet by mouth every 6 hours as needed for Moderate Pain for up to 30 days.  Dispense: 90 Tablet; Refill: 0    3. Diarrhea, unspecified type  Drug task force reviewed  - oxyCODONE immediate release (ROXICODONE) 10 MG immediate release tablet; Take 1 Tablet by mouth every 6 hours as needed for Moderate Pain for up to 30 days.  Dispense: 90 Tablet; Refill: 0    4. 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  Dispense: 90 Tablet; Refill: 0    5. Radicular low back pain  Continue Roxicodone  - oxyCODONE immediate release (ROXICODONE) 10 MG immediate release tablet; Take 1 Tablet by mouth every 6 hours as needed for  Moderate Pain for up to 30 days.  Dispense: 90 Tablet; Refill: 0    6. Renal insufficiency  As detailed above  - oxyCODONE immediate release (ROXICODONE) 10 MG immediate release tablet; Take 1 Tablet by mouth every 6 hours as needed for Moderate Pain for up to 30 days.  Dispense: 90 Tablet; Refill: 0  - Nirmatrelvir&Ritonavir 150/100 10 x 150 MG & 10 x 100MG Tablet Therapy Pack; Take 150 mg nirmatrelvir (one 150 mg tablet) with 100 mg ritonavir (one 100 mg tablet) by mouth, with both tablets taken together twice daily for 5 days.  Dispense: 20 Each; Refill: 0    7. Exposure to potential infection  Patient is to have Paxlovid available when traveling  - Nirmatrelvir&Ritonavir 150/100 10 x 150 MG & 10 x 100MG Tablet Therapy Pack; Take 150 mg nirmatrelvir (one 150 mg tablet) with 100 mg ritonavir (one 100 mg tablet) by mouth, with both tablets taken together twice daily for 5 days.  Dispense: 20 Each; Refill: 0    8. Nephrolithiasis  Continue Cefzil liquid  - cefPROZIL (CEFZIL) 250 MG/5ML Recon Susp; Take 10 mL by mouth 2 times a day for 5 days.  Dispense: 100 mL; Refill: 0             [1]   Social History  Tobacco Use    Smoking status: Never     Passive exposure: Never    Smokeless tobacco: Never    Tobacco comments:     second hand smoke parents - smoked for only 1 year many years ago   Vaping Use    Vaping status: Former    Substances: THC   Substance Use Topics    Alcohol use: Not Currently     Alcohol/week: 0.6 oz     Types: 1 Shots of liquor per week     Comment: gin and half a lime; tonic water    Drug use: Not Currently     Types: Marijuana, Inhaled     Comment: medical marijuana through bong/vape   [2]   Current Outpatient Medications:     oxyCODONE immediate release (ROXICODONE) 10 MG immediate release tablet, Take 1 Tablet by mouth every 6 hours as needed for Moderate Pain for up to 30 days., Disp: 90 Tablet, Rfl: 0    Nirmatrelvir&Ritonavir 150/100 10 x 150 MG & 10 x 100MG Tablet Therapy Pack, Take 150 mg  nirmatrelvir (one 150 mg tablet) with 100 mg ritonavir (one 100 mg tablet) by mouth, with both tablets taken together twice daily for 5 days., Disp: 20 Each, Rfl: 0    cefPROZIL (CEFZIL) 250 MG/5ML Recon Susp, Take 10 mL by mouth 2 times a day for 5 days., Disp: 100 mL, Rfl: 0    tamsulosin (FLOMAX) 0.4 MG capsule, Take 1 Capsule by mouth 1/2 hour after breakfast., Disp: 10 Capsule, Rfl: 0    diazePAM (VALIUM) 5 MG Tab, Take 5 mg by mouth every 6 hours as needed (Back Spasms). Indications: Muscle Spasm, Disp: , Rfl:     Cyanocobalamin (B-12 PO), Take 2 Tablets by mouth every day. gummies, Disp: , Rfl:     MAGNESIUM PO, Take 2 Tablets by mouth every day. gummies, Disp: , Rfl:     ondansetron (ZOFRAN ODT) 4 MG TABLET DISPERSIBLE, Take 1 Tablet by mouth every 6 hours as needed for Nausea/Vomiting., Disp: 20 Tablet, Rfl: 5    naratriptan (AMERGE) 2.5 MG tablet, Take 1 Tablet by mouth as needed for Migraine., Disp: 5 Tablet, Rfl: 3    spironolactone (ALDACTONE) 25 MG Tab, Take 1 Tablet by mouth every day., Disp: 90 Tablet, Rfl: 3    Ascorbic Acid (VITAMIN C GUMMIE PO), Take 2 Tablets by mouth every day., Disp: , Rfl:     Biotin-Vitamin C (HAIR SKIN NAILS GUMMIES PO), Take 2 Tablets by mouth every day., Disp: , Rfl:     Ca Phosphate-Cholecalciferol (CALCIUM/VITAMIN D3 GUMMIES PO), Take 2 Tablets by mouth every day., Disp: , Rfl:     POTASSIUM CITRATE PO, Take 2 Tablets by mouth every day. Gummy, Disp: , Rfl:     Multiple Vitamins-Minerals (MULTI-VITAMIN GUMMIES PO), Take 2 Tablets by mouth every day., Disp: , Rfl:     Probiotic Product (PROBIOTIC GUMMIES PO), Take 1 Tablet by mouth in the morning, at noon, and at bedtime., Disp: , Rfl:     prochlorperazine (COMPAZINE) 10 MG Tab, Take 1 Tablet by mouth every 6 hours as needed for Nausea/Vomiting., Disp: 30 Tablet, Rfl: 3    Azelaic Acid 15 % Gel, Apply 1 Application topically every day. Apply's on face, Disp: , Rfl:     hydroxychloroquine (PLAQUENIL) 200 MG Tab, Take 1  Tablet by mouth every day., Disp: 90 Tablet, Rfl: 3    metoprolol tartrate (LOPRESSOR) 50 MG Tab, Take 1 Tablet by mouth 2 times a day., Disp: 180 Tablet, Rfl: 3

## 2025-05-31 NOTE — PROGRESS NOTES
Subjective  Jana Overton is a 69 y.o. female who presents today for cystoscopy to evaluate Nephrolithiasis. Patient having some flank and pelvic pain related to stent.    Family History   Problem Relation Age of Onset    Heart Disease Mother         Angina, MI later in life    Lung Disease Mother         copd    Diabetes Father     Heart Disease Father     Diabetes Sister     Cancer Maternal Aunt 40        breast       Social History     Socioeconomic History    Marital status:      Spouse name: Not on file    Number of children: Not on file    Years of education: Not on file    Highest education level: Associate degree: academic program   Occupational History    Not on file   Tobacco Use    Smoking status: Never     Passive exposure: Never    Smokeless tobacco: Never    Tobacco comments:     second hand smoke parents - smoked for only 1 year many years ago   Vaping Use    Vaping status: Former    Substances: THC   Substance and Sexual Activity    Alcohol use: Not Currently     Alcohol/week: 0.6 oz     Types: 1 Shots of liquor per week     Comment: gin and half a lime; tonic water    Drug use: Not Currently     Types: Marijuana, Inhaled     Comment: medical marijuana through bong/vape    Sexual activity: Not on file     Comment:    Other Topics Concern    Not on file   Social History Narrative    Not on file     Social Drivers of Health     Financial Resource Strain: Unknown (1/15/2023)    Overall Financial Resource Strain (CARDIA)     Difficulty of Paying Living Expenses: Patient declined   Food Insecurity: No Food Insecurity (5/18/2025)    Hunger Vital Sign     Worried About Running Out of Food in the Last Year: Never true     Ran Out of Food in the Last Year: Never true   Transportation Needs: No Transportation Needs (5/18/2025)    PRAPARE - Transportation     Lack of Transportation (Medical): No     Lack of Transportation (Non-Medical): No   Physical Activity: Sufficiently Active (1/15/2023)     Exercise Vital Sign     Days of Exercise per Week: 7 days     Minutes of Exercise per Session: 60 min   Stress: No Stress Concern Present (11/4/2021)    [place] [place] [employer] - Occupational Stress Questionnaire     Feeling of Stress : Not at all   Social Connections: Socially Integrated (1/15/2023)    Social Connection and Isolation Panel [NHANES]     Frequency of Communication with Friends and Family: Three times a week     Frequency of Social Gatherings with Friends and Family: Once a week     Attends Holiness Services: More than 4 times per year     Active Member of Clubs or Organizations: Yes     Attends Club or Organization Meetings: More than 4 times per year     Marital Status:    Intimate Partner Violence: Not At Risk (5/18/2025)    Humiliation, Afraid, Rape, and Kick questionnaire     Fear of Current or Ex-Partner: No     Emotionally Abused: No     Physically Abused: No     Sexually Abused: No   Housing Stability: Low Risk  (5/18/2025)    Housing Stability Vital Sign     Unable to Pay for Housing in the Last Year: No     Number of Times Moved in the Last Year: 0     Homeless in the Last Year: No       Past Surgical History[1]    Past Medical History[2]    Current Medications[3]    Allergies[4]    Objective  There were no vitals taken for this visit.  Physical Exam  Constitutional:       Appearance: Normal appearance.   HENT:      Head: Normocephalic and atraumatic.   Eyes:      Pupils: Pupils are equal, round, and reactive to light.   Pulmonary:      Effort: No respiratory distress.   Skin:     General: Skin is warm and dry.   Neurological:      General: No focal deficit present.      Mental Status: She is alert.   Psychiatric:         Mood and Affect: Mood normal.         Behavior: Behavior normal.         Labs:     POC UA  Lab Results   Component Value Date/Time    POCCOLOR yellow 11/23/2016 03:22 PM    POCAPPEAR clear 11/23/2016 03:22 PM    POCLEUKEST small 11/23/2016 03:22  PM    POCNITRITE negative 11/23/2016 03:22 PM    POCUROBILIGE negative 11/23/2016 03:22 PM    POCPROTEIN negative 11/23/2016 03:22 PM    POCURPH 5.0 11/23/2016 03:22 PM    POCBLOOD hemolyzed 11/23/2016 03:22 PM    POCSPGRV 1.020 11/23/2016 03:22 PM    POCKETONES negative 11/23/2016 03:22 PM    POCBILIRUBIN negative 11/23/2016 03:22 PM    POCGLUCUA negative 11/23/2016 03:22 PM      A1C  Lab Results   Component Value Date/Time    HBA1C 5.3 11/17/2023 1034    AVGLUC 105 11/17/2023 1034           Imaging:     none    Procedure    Procedure performed: Cystoscopy with stent removal    Surgeon: Dr. Chloe Narayan    Indications For Procedure: Nephrolithiasis    Blood Loss: 0 cc     Anesthesia: Lidocaine jelly     Specimen: none     Findings:     1. Urethra: no lesions or masses    2. Bladder: no lesions or masses    3. Stent removed intact    Description of Procedure: The patient was prepped and draped in the usual sterile fashion.  A 17Fr flexible cystoscope was advanced along the urethra into the bladder under direct vision.  We performed a thorough cystoscopic evaluation patient's bladder including retroflexion, findings noted above.  We removed the cystoscope under direct vision to evaluate the urethra, findings noted above.  This concluded the procedure, the patient tolerated it well.     Assessment  The patient was instructed to call our office if she develops any signs of infection including increased frequency, urgency, dysuria, fever, or chills.  We discussed the results of the cystoscopy with the patient, all questions and concerns addressed. We will get a RENETTA in 6 weeks.    Plan    There are no diagnoses linked to this encounter.    RTC 6 weeks with RENETTA       [1]   Past Surgical History:  Procedure Laterality Date    ME CYSTOSCOPY,INSERT URETERAL STENT Right 5/27/2025    Procedure: CYSTOSCOPY, RIGHT URETERSCOPY, STENT  PLACEMENT;  Surgeon: Jaimie Kyle M.D.;  Location: SURGERY SAME DAY [facility];  Service: Urology     OH CYSTOSCOPY,INSERT URETERAL STENT Right 5/18/2025    Procedure: CYSTOSCOPY, WITH URETERAL STENT INSERTION;  Surgeon: Chloe Narayan M.D.;  Location: SURGERY [facility];  Service: Urology    OH UPPER GI ENDOSCOPY,DIAGNOSIS N/A 12/7/2024    Procedure: EGD WITH BOTOX INJECTION;  Surgeon: Stuart Payan D.O.;  Location: SURGERY [facility];  Service: Gastroenterology    OH UPPER GI ENDOSCOPY,DIAGNOSIS N/A 2/8/2023    Procedure: GASTROSCOPY;  Surgeon: Jamarcus Davalos M.D.;  Location: SURGERY SAME DAY [facility];  Service: Gastroenterology    OH UPPER GI ENDOSCOPY,W/DILAT,GASTRIC OUT N/A 2/8/2023    Procedure: GASTROSCOPY, WITH BALLOON DILATION;  Surgeon: Jamarcus Davalos M.D.;  Location: SURGERY SAME DAY [facility];  Service: Gastroenterology    OH UPPER GI ENDOSCOPY,BIOPSY N/A 2/8/2023    Procedure: GASTROSCOPY, WITH BIOPSY;  Surgeon: Jamarcus Davalos M.D.;  Location: SURGERY SAME DAY [facility];  Service: Gastroenterology    OH UPPER GI ENDOSCOPY,DIAGNOSIS N/A 1/16/2023    Procedure: GASTROSCOPY;  Surgeon: Jamarcus Davalos M.D.;  Location: SURGERY SAME DAY [facility];  Service: Gastroenterology    OH UPPER GI ENDOSCOPY,W/DILAT,GASTRIC OUT N/A 1/16/2023    Procedure: GASTROSCOPY, WITH BALLOON DILATION;  Surgeon: Jamarcus Davalos M.D.;  Location: SURGERY SAME DAY [facility];  Service: Gastroenterology    OH UPPER GI ENDOSCOPY,BIOPSY N/A 1/16/2023    Procedure: GASTROSCOPY, WITH BIOPSY;  Surgeon: Jamarcus Davalos M.D.;  Location: SURGERY SAME DAY [facility];  Service: Gastroenterology    OH UPPER GI ENDOSCOPY,DIAGNOSIS N/A 10/24/2022    Procedure: GASTROSCOPY;  Surgeon: Jamarcus Davalos M.D.;  Location: SURGERY SAME DAY [facility];  Service: Gastroenterology    BILIARY DILATATION N/A 10/24/2022    Procedure: DILATION, STRICTURE, BILE DUCT;  Surgeon: Jamarcus Davalos M.D.;  Location: SURGERY SAME DAY [facility];  Service: Gastroenterology    OH CYSTOSCOPY,INSERT URETERAL STENT Right 12/23/2021    Procedure: CYSTOSCOPY, WITH URETERAL STENT  EXCHANGE;  Surgeon: Jonathan Turcios M.D.;  Location: [facility];  Service: Urology    CO CYSTO/URETERO/PYELOSCOPY, DX Right 12/23/2021    Procedure: URETEROSCOPY;  Surgeon: Jonathan Turcios M.D.;  Location: [facility];  Service: Urology    CO CYSTO/URETERO/PYELOSCOPY, DX Right 12/8/2021    Procedure: URETEROSCOPY;  Surgeon: Luc Hook M.D.;  Location: [facility];  Service: Urology    CYSTOSCOPY WITH URETERAL STENT INSERTION OR REMOVAL Right 12/8/2021    Procedure: CYSTOSCOPY, WITH URETERAL STENT INSERTION;  Surgeon: Luc Hook M.D.;  Location: [facility];  Service: Urology    ILEOSTOMY  1/20/2021    Procedure: ILEOSTOMY- COMPLEX REVISION,;  Surgeon: Kevin Cruz M.D.;  Location: [facility];  Service: General    LYSIS ADHESIONS GENERAL  1/20/2021    Procedure: LYSIS, ADHESIONS;  Surgeon: Kevin Cruz M.D.;  Location: [facility];  Service: General    GASTROSCOPY N/A 5/22/2019    Procedure: GASTROSCOPY - WITH DILATION;  Surgeon: Dionicio Cardona M.D.;  Location: [facility];  Service: Gastroenterology    GASTROSCOPY  1/6/2019    Procedure: GASTROSCOPY- WITH DILATION;  Surgeon: Daniel Daniels M.D.;  Location: [facility];  Service: Gastroenterology    ILEOSTOMY  12/29/2018    Procedure: ILEOSTOMY- REVISION;  Surgeon: Kevin Cruz M.D.;  Location: [facility];  Service: General    SIGMOIDOSCOPY FLEX  12/29/2018    Procedure: SIGMOIDOSCOPY FLEX;  Surgeon: Kevin Cruz M.D.;  Location: [facility];  Service: General    EXPLORATORY LAPAROTOMY  12/29/2018    Procedure: EXPLORATORY LAPAROTOMY, lysis of adhesions;  Surgeon: Kevin Cruz M.D.;  Location: [facility];  Service: General    ILEOSTOMY  5/14/2014    Performed by Kevin Cruz M.D. at [facility]    MAMMOPLASTY REDUCTION  7/17/2013    Performed by Janey Burt M.D. at SURGERY [facility]  REMOVAL NEURO  7/16/2012    Performed by KEELEY KIM at SURGERY [facility] ORS    GASTROSCOPY  10/4/2011    Performed by TYSON MARTINEZ at SURGERY SAME DAY [facility] ORS    COLONOSCOPY  10/4/2011    Performed by TYSON MARTINEZ at SURGERY SAME DAY [facility] ORS    DILATION AND CURETTAGE  9/24/2010    Performed by GAURAV ALY at SURGERY SAME DAY [facility] ORS    ILEOSTOMY  11/11/2009    Performed by SYDNEE AUGUSTINE at SURGERY [facility] ORS    GASTROSCOPY WITH BIOPSY  11/22/08    Performed by NEGRITA HUYNH at SURGERY [facility]    ABDOMINAL EXPLORATION      CERVICAL DISK AND FUSION ANTERIOR      COLON RESECTION      FOOT SURGERY      HAND SURGERY      LUMBAR FUSION ANTERIOR      LUMPECTOMY      OTHER ABDOMINAL SURGERY      surgery for chrons disease    AR BREAST REDUCTION     [2]   Past Medical History:  Diagnosis Date    Anesthesia     Difficult IV stick    Anxiety     Arrhythmia     Arthritis     all over    ASTHMA     has prn inhaler; does not use very often per pt    Atrial fib/flut     Backpain     Blood clotting disorder (HCC)     Bronchitis     5 years    Chronic pain     Colostomy in place (HCC)     Crohn's disease of colon (HCC)     Dyspnea     History of cardiac murmur     Ileostomy present (HCC)     Infectious disease     MRSA, VancoRSA    Multiple falls     Narcotic dependence (HCC)     Obstruction     Pain     Pneumonia     child and mid 30's    Postherpetic neuralgia     Renal disorder     Rosacea     Sleep apnea    [3]   Current Outpatient Medications   Medication Sig Dispense Refill    oxyCODONE immediate release (ROXICODONE) 10 MG immediate release tablet Take 1 Tablet by mouth every 6 hours as needed for Moderate Pain for up to 30 days. 90 Tablet 0    Nirmatrelvir&Ritonavir 150/100 10 x 150 MG & 10 x 100MG Tablet Therapy Pack Take 150 mg nirmatrelvir (one 150 mg tablet) with 100 mg ritonavir (one 100 mg tablet) by mouth, with both tablets taken together twice daily for 5 days.  20 Each 0    cefPROZIL (CEFZIL) 250 MG/5ML Recon Susp Take 10 mL by mouth 2 times a day for 5 days. 100 mL 0    tamsulosin (FLOMAX) 0.4 MG capsule Take 1 Capsule by mouth 1/2 hour after breakfast. 10 Capsule 0    diazePAM (VALIUM) 5 MG Tab Take 5 mg by mouth every 6 hours as needed (Back Spasms). Indications: Muscle Spasm      Cyanocobalamin (B-12 PO) Take 2 Tablets by mouth every day. gummies      MAGNESIUM PO Take 2 Tablets by mouth every day. gummies      ondansetron (ZOFRAN ODT) 4 MG TABLET DISPERSIBLE Take 1 Tablet by mouth every 6 hours as needed for Nausea/Vomiting. 20 Tablet 5    naratriptan (AMERGE) 2.5 MG tablet Take 1 Tablet by mouth as needed for Migraine. 5 Tablet 3    spironolactone (ALDACTONE) 25 MG Tab Take 1 Tablet by mouth every day. 90 Tablet 3    Ascorbic Acid (VITAMIN C GUMMIE PO) Take 2 Tablets by mouth every day.      Biotin-Vitamin C (HAIR SKIN NAILS GUMMIES PO) Take 2 Tablets by mouth every day.      Ca Phosphate-Cholecalciferol (CALCIUM/VITAMIN D3 GUMMIES PO) Take 2 Tablets by mouth every day.      POTASSIUM CITRATE PO Take 2 Tablets by mouth every day. Gummy      Multiple Vitamins-Minerals (MULTI-VITAMIN GUMMIES PO) Take 2 Tablets by mouth every day.      Probiotic Product (PROBIOTIC GUMMIES PO) Take 1 Tablet by mouth in the morning, at noon, and at bedtime.      prochlorperazine (COMPAZINE) 10 MG Tab Take 1 Tablet by mouth every 6 hours as needed for Nausea/Vomiting. 30 Tablet 3    Azelaic Acid 15 % Gel Apply 1 Application topically every day. Apply's on face      hydroxychloroquine (PLAQUENIL) 200 MG Tab Take 1 Tablet by mouth every day. 90 Tablet 3    metoprolol tartrate (LOPRESSOR) 50 MG Tab Take 1 Tablet by mouth 2 times a day. 180 Tablet 3     No current facility-administered medications for this visit.   [4]   Allergies  Allergen Reactions    Amoxicillin Anaphylaxis    Demerol Hcl [Meperidine] Shortness of Breath and Palpitations    Doxycycline Rash     Throat also closes up      "Methotrexate Hives, Shortness of Breath and Rash          Morphine Shortness of Breath and Palpitations    Promethazine Shortness of Breath and Rash          Remicade [Infliximab] Hives, Shortness of Breath and Rash          Sudafed [Pseudoephedrine] Shortness of Breath, Vomiting and Palpitations    Azathioprine Sodium Hives and Shortness of Breath     10/21/2022 - patient unable to verify allergy/reaction  Historical reaction listed: Hives, Shortness of Breath    Carafate [Sucralfate] Vomiting and Nausea     + Mouth Sores    Other Drug Unspecified     \"STEROIDS\" - REACTION NOT SPECIFIED    Sulfa Drugs Rash          Sulfasalazine Rash          Gabapentin Cough, Hives, Itching, Nausea, Palpitations, Photosensitivity, Rash, Runny Nose, Swelling, Unspecified and Vomiting    Nitroglycerin      Headache    Amitriptyline Unspecified     Nightmares      Ativan [Lorazepam] Unspecified     Nightmares    Betamethasone Unspecified     10/21/2022 - patient unable to verify allergy/reaction  No historical reaction listed  Patient is able to tollerate prednesone 4/24/2025.     Fentanyl Unspecified     \"Patches didn't work\" and skin broke down    Lyrica [Pregabalin] Nausea          Methadone Unspecified     \"Didn't work\"    Potassium Unspecified     Notes: In IV only  REACTION NOT SPECIFIED    Tizanidine Unspecified     10/21/2022 - patient unable to verify allergy/reaction  No historical reaction listed     "

## 2025-06-02 ENCOUNTER — TELEPHONE (OUTPATIENT)
Dept: SURGERY | Facility: MEDICAL CENTER | Age: 70
End: 2025-06-02
Payer: MEDICARE

## 2025-06-03 ENCOUNTER — TELEPHONE (OUTPATIENT)
Dept: SURGERY | Facility: MEDICAL CENTER | Age: 70
End: 2025-06-03
Payer: MEDICARE

## 2025-06-05 ENCOUNTER — TELEPHONE (OUTPATIENT)
Dept: CARDIOLOGY | Facility: MEDICAL CENTER | Age: 70
End: 2025-06-05
Payer: MEDICARE

## 2025-06-05 NOTE — TELEPHONE ENCOUNTER
AB    Caller: Jana Overton     Topic/issue: Pt called in regards to being in and out of Afib since yesterday pt is traveling and would like a call back on what she should do please advise.     Callback Number: [phone] (home)      Thank you,     Osito HINOJOSA

## 2025-06-05 NOTE — TELEPHONE ENCOUNTER
Phone Number Called: [phone]     Call outcome: Left detailed message for patient. Informed to call back with any additional questions.    Message: Called to discuss     Per OV note 11/27/25: You can take up to 100 mg twice daily. This means it anytime you can increase your dose from 1 tablet twice daily to 2 tablets twice daily or just simply take an additional 1 tablet as needed.     Requested call back to discuss any additional symptoms.

## 2025-06-06 DIAGNOSIS — I10 ESSENTIAL HYPERTENSION: ICD-10-CM

## 2025-06-06 RX ORDER — METOPROLOL TARTRATE 50 MG
50 TABLET ORAL SEE ADMIN INSTRUCTIONS
Qty: 220 TABLET | Refills: 3 | Status: SHIPPED | OUTPATIENT
Start: 2025-06-06

## 2025-06-06 NOTE — TELEPHONE ENCOUNTER
"    Caller: Jana Overton    Topic/issue: Patient received voicemail from yesterday however she still would like to speak with RN on the phone if possible. She said she has accidental symptoms she would like to discuss that are \"freaky\" - please callback when you have time.     Callback Number: [phone]    Thank you,  Florencia FERREIRA"

## 2025-06-06 NOTE — TELEPHONE ENCOUNTER
LS:     Phone Number Called: [phone]     Call outcome: Spoke to patient regarding message below.    Message: Called to discuss. Pt states she is very out of breath. She knows when she is in afib. States more and more episodes. HR is jumping around without any triggers. . Advised she could go to ER for cardioversion.     Pt extremely fatigued, hungry all the time. Had an episode of right arm weakness last night, strength is still not what she feels it should be    Pt denies weight gain, has lost weight, ankles swollen.     Taking metoprolol 50mg BID but will take an extra dose during the day.     Pt has to leave town to [place] for family emergency. Back on Tuesday.     Pt verbalized that she will go to the ER if she has any increase in symptoms either tonight or in [place].     I will update her Monday with LS recommendations

## 2025-06-07 NOTE — PROGRESS NOTES
Changing metoprolol tartrate prescription to include 50 mg twice daily and an additional 1 to 2 tablets twice daily for palpitations.

## 2025-06-11 ENCOUNTER — APPOINTMENT (OUTPATIENT)
Dept: URBAN - METROPOLITAN AREA CLINIC 20 | Facility: CLINIC | Age: 70
Setting detail: DERMATOLOGY
End: 2025-06-11

## 2025-06-11 ENCOUNTER — APPOINTMENT (OUTPATIENT)
Dept: URBAN - METROPOLITAN AREA CLINIC 36 | Facility: CLINIC | Age: 70
Setting detail: DERMATOLOGY
End: 2025-06-11

## 2025-06-11 DIAGNOSIS — Z41.9 ENCOUNTER FOR PROCEDURE FOR PURPOSES OTHER THAN REMEDYING HEALTH STATE, UNSPECIFIED: ICD-10-CM

## 2025-06-11 PROCEDURE — ? FRAXEL

## 2025-06-11 PROCEDURE — ? SCITON BBL

## 2025-06-11 ASSESSMENT — LOCATION ZONE DERM
LOCATION ZONE: LIP
LOCATION ZONE: FACE
LOCATION ZONE: NOSE
LOCATION ZONE: NOSE

## 2025-06-11 ASSESSMENT — LOCATION SIMPLE DESCRIPTION DERM
LOCATION SIMPLE: LEFT FOREHEAD
LOCATION SIMPLE: NOSE
LOCATION SIMPLE: RIGHT LIP
LOCATION SIMPLE: CHIN
LOCATION SIMPLE: NOSE
LOCATION SIMPLE: RIGHT CHEEK
LOCATION SIMPLE: LEFT CHEEK

## 2025-06-11 ASSESSMENT — LOCATION DETAILED DESCRIPTION DERM
LOCATION DETAILED: LEFT CENTRAL MALAR CHEEK
LOCATION DETAILED: NASAL SUPRATIP
LOCATION DETAILED: LEFT CHIN
LOCATION DETAILED: NASAL DORSUM
LOCATION DETAILED: RIGHT CENTRAL MALAR CHEEK
LOCATION DETAILED: NASAL DORSUM
LOCATION DETAILED: RIGHT LOWER CUTANEOUS LIP
LOCATION DETAILED: LEFT MEDIAL FOREHEAD

## 2025-06-11 NOTE — PROCEDURE: FRAXEL
Treatment Level: 5
Number Of Passes: 1
Energy(Mj/Cm2): 70
Large Metal Eye Shield Text: The ocular mucosa was anesthetized with tetracaine. Once adequate anesthesia was optained, large metal eye shields were inserted and remained in place until the procedure was completed.
Indication: surgical scars
Number Of Passes: 4
Was An Eye Shield Used?: No
Consent: Written consent obtained, risks reviewed including but not limited to pain and incomplete improvement.
Wavelength: 1550nm
Location: perioral area
Anesthesia Type: 1% lidocaine with epinephrine
Small Plastic Eye Shield Text: The ocular mucosa was anesthetized with tetracaine. Once adequate anesthesia was optained, small plastic eye shields were inserted and remained in place until the procedure was completed.
Location: full face
Medium Plastic Eye Shield Text: The ocular mucosa was anesthetized with tetracaine. Once adequate anesthesia was optained, medium plastic eye shields were inserted and remained in place until the procedure was completed.
Small Metal Eye Shield Text: The ocular mucosa was anesthetized with tetracaine. Once adequate anesthesia was optained, small metal eye shields were inserted and remained in place until the procedure was completed.
Post-Care Instructions: I reviewed with the patient in detail post-care instructions. Patient should avoid sun until area fully healed.
Detail Level: Zone
Location: nose
Large Plastic Eye Shield Text: The ocular mucosa was anesthetized with tetracaine. Once adequate anesthesia was optained, large plastic eye shields were inserted and remained in place until the procedure was completed.
Medium Metal Eye Shield Text: The ocular mucosa was anesthetized with tetracaine. Once adequate anesthesia was optained, medium metal eye shields were inserted and remained in place until the procedure was completed.
Energy(Mj/Cm2): 20

## 2025-06-12 ENCOUNTER — OFFICE VISIT (OUTPATIENT)
Dept: MEDICAL GROUP | Facility: LAB | Age: 70
End: 2025-06-12
Payer: MEDICARE

## 2025-06-12 VITALS
BODY MASS INDEX: 18.04 KG/M2 | DIASTOLIC BLOOD PRESSURE: 55 MMHG | OXYGEN SATURATION: 96 % | HEART RATE: 78 BPM | WEIGHT: 133.2 LBS | HEIGHT: 72 IN | SYSTOLIC BLOOD PRESSURE: 110 MMHG | TEMPERATURE: 97.2 F | RESPIRATION RATE: 14 BRPM

## 2025-06-12 DIAGNOSIS — K50.019 CROHN'S DISEASE OF SMALL INTESTINE WITH COMPLICATION (HCC): ICD-10-CM

## 2025-06-12 DIAGNOSIS — N28.9 RENAL INSUFFICIENCY: ICD-10-CM

## 2025-06-12 DIAGNOSIS — M54.10 RADICULAR LOW BACK PAIN: ICD-10-CM

## 2025-06-12 DIAGNOSIS — R19.7 DIARRHEA, UNSPECIFIED TYPE: ICD-10-CM

## 2025-06-12 DIAGNOSIS — R33.9 URINARY RETENTION: Primary | ICD-10-CM

## 2025-06-12 DIAGNOSIS — K26.9: ICD-10-CM

## 2025-06-12 DIAGNOSIS — K21.9 GASTROESOPHAGEAL REFLUX DISEASE WITHOUT ESOPHAGITIS: ICD-10-CM

## 2025-06-12 PROCEDURE — 3074F SYST BP LT 130 MM HG: CPT | Performed by: INTERNAL MEDICINE

## 2025-06-12 PROCEDURE — 99214 OFFICE O/P EST MOD 30 MIN: CPT | Performed by: INTERNAL MEDICINE

## 2025-06-12 PROCEDURE — 3078F DIAST BP <80 MM HG: CPT | Performed by: INTERNAL MEDICINE

## 2025-06-12 RX ORDER — TAMSULOSIN HYDROCHLORIDE 0.4 MG/1
0.4 CAPSULE ORAL
Qty: 90 CAPSULE | Refills: 3 | Status: SHIPPED | OUTPATIENT
Start: 2025-06-12

## 2025-06-12 RX ORDER — OXYCODONE HYDROCHLORIDE 10 MG/1
10 TABLET ORAL EVERY 6 HOURS PRN
Qty: 90 TABLET | Refills: 0 | Status: SHIPPED | OUTPATIENT
Start: 2025-06-28 | End: 2025-07-28

## 2025-06-12 ASSESSMENT — FIBROSIS 4 INDEX: FIB4 SCORE: 2.76

## 2025-06-13 NOTE — PROGRESS NOTES
CC: Jana Overton is a 69 y.o. female is suffering from   Chief Complaint   Patient presents with    Other     Started tamsulosin          SUBJECTIVE:  1. Urinary retention  Jana is here for follow-up has a history of urinary retention, states that she feels she is doing much better with the use of tamsulosin which will be continued, this was initially started because of a retained kidney stone    2. Crohn's disease of small intestine with complication (HCC)  Patient with a history of Crohn's disease of the small intestine status post complete colectomy    3. Gastroesophageal reflux disease without esophagitis  History of severe gastroesophageal reflux disease and achalasia, is scheduled to be evaluated at [facility]    4. Diarrhea, unspecified type  History of diarrhea clinically stable    5. 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 Radicular low back pain  Continue narcotic analgesics    7. Renal insufficiency  Stable    History of Present Illness              Past social, family, history: Social History[1]       MEDICATIONS:  Current Medications[2]    PROBLEMS:  Patient Active Problem List    Diagnosis Date Noted    Urinary tract infection 05/19/2025    Nephrolithiasis 05/18/2025    FLORES (acute kidney injury) (HCC) 12/04/2024    Metabolic acidosis 12/04/2024    Increased ostomy output 12/04/2024    Intractable nausea and vomiting 12/04/2024    ACP (advance care planning) 12/04/2024    Easy bruising 12/04/2024    Underweight (BMI < 18.5) 11/27/2024    Thrombocytopenia (HCC) 12/11/2023    Chronic diastolic congestive heart failure (HCC) 12/11/2023    Chronic kidney disease, stage 3b 11/22/2023    Transient alteration of awareness 10/26/2023    Achalasia 10/18/2023    Esophageal candidiasis (HCC) 02/08/2023    Sacroiliac joint pain 11/10/2022    Esophageal stricture 10/22/2022    Dysphagia 10/21/2022    Drug-induced lupus erythematosus 09/27/2022     Long-term use of hydroxychloroquine 09/27/2022    Fibromyalgia syndrome 09/27/2022    Primary osteoarthritis of both hands 07/28/2022    Positive cardiolipin antibodies (IgA and IgG anti-CL) 07/27/2022    Positive VAHID (1:640 homogenous pattern with negative reflex) 07/27/2022    Inflammatory arthritis 07/27/2022    Oropharyngeal dysphagia 06/02/2022    Acute pancreatitis 05/23/2022    Migraine 06/04/2019    Essential hypertension 05/20/2019    SIRS (systemic inflammatory response syndrome) ([facility]) 05/19/2019    History of MRSA infection 12/29/2018    History of infection with vancomycin resistant Enterococcus (VRE) 12/29/2018    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[facility]) 12/28/2018    Anemia 12/15/2018    Dehydration 12/12/2018    Hyponatremia 12/12/2018    Leukocytosis 12/12/2018    Anxiety disorder due to multiple medical problems 12/01/2017    DDD (degenerative disc disease), lumbar 04/12/2017    History of DVT (deep vein thrombosis) 11/23/2016    Atrial fibrillation ([facility]) 03/26/2015    Sleep apnea 10/26/2014    Postherpetic neuralgia 06/24/2014    Multiple falls 06/24/2014    COPD (chronic obstructive pulmonary disease) ([facility]) 06/24/2014    Rosacea 06/24/2014    Ileostomy in place ([facility]) 06/26/2013    GERD (gastroesophageal reflux disease) 12/05/2012    Status post lumbar surgery 07/16/2012    Crohn's disease of small intestine with complication ([facility]) 09/23/2009    Central sensitization to pain 09/23/2009    Opioid dependence ([facility]) 09/23/2009    Degenerative joint disease of cervical and lumbar spine 09/23/2009       REVIEW OF SYSTEMS:  Gen.:  No Nausea, Vomiting, fever, Chills.  Heart: No chest pain.  Lungs:  No shortness of Breath.  Psychological: Rg unusual Anxiety depression     PHYSICAL EXAM   Physical Exam             Constitutional: Alert, cooperative, not in acute distress.  Cardiovascular:  Rate Rhythm is regular without murmurs rubs clicks.     Thorax & Lungs: Clear to auscultation, no wheezing, rhonchi, or rales  HENT:  Normocephalic, Atraumatic.  Eyes: PERRLA, EOMI, Conjunctiva normal.   Neck: Trachia is midline no swelling of the thyroid.   Lymphatic: No lymphadenopathy noted.   Neurologic: Alert & oriented x 3, cranial nerves II through XII are intact, Normal motor function, Normal sensory function, No focal deficits noted.   Psychiatric: Affect normal, Judgment normal, Mood normal.     VITAL SIGNS:/55   Pulse 78   Temp 36.2 °C (97.2 °F) (Temporal)   Resp 14   Ht 1.829 m (6')   Wt 60.4 kg (133 lb 3.2 oz)   LMP  (LMP Unknown)   SpO2 96%   BMI 18.07 kg/m²     Labs: Reviewed    Assessment:                                                     Plan:  [unfilled]             1. Urinary retention (Primary)  Continue Flomax  - tamsulosin (FLOMAX) 0.4 MG capsule; Take 1 Capsule by mouth 1/2 hour after breakfast.  Dispense: 90 Capsule; Refill: 3    2. Crohn's disease of small intestine with complication (HCC)  Increase Roxicodone to 1 tablet 3 times a day  - oxyCODONE immediate release (ROXICODONE) 10 MG immediate release tablet; Take 1 Tablet by mouth every 6 hours as needed for Moderate Pain for up to 30 days.  Dispense: 90 Tablet; Refill: 0    3. Gastroesophageal reflux disease without esophagitis  As detailed above continue follow-up with [facility] due to severe achalasia and gastroesophageal reflux disease  - oxyCODONE immediate release (ROXICODONE) 10 MG immediate release tablet; Take 1 Tablet by mouth every 6 hours as needed for Moderate Pain for up to 30 days.  Dispense: 90 Tablet; Refill: 0    4. Diarrhea, unspecified type  Likely improved with the use of oxycodone  - oxyCODONE immediate release (ROXICODONE) 10 MG immediate release tablet; Take 1 Tablet by mouth every 6 hours as needed for Moderate Pain for up to 30 days.  Dispense: 90 Tablet; Refill: 0    5. 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  Dispense: 90 Tablet; Refill: 0    6. Radicular low back pain  Status post evaluation at [facility]  - oxyCODONE immediate release (ROXICODONE) 10 MG immediate release tablet; Take 1 Tablet by mouth every 6 hours as needed for Moderate Pain for up to 30 days.  Dispense: 90 Tablet; Refill: 0    7. Renal insufficiency  Clinically stable avoid all nonsteroidal anti-inflammatories             [1]   Social History  Tobacco Use    Smoking status: Never     Passive exposure: Never    Smokeless tobacco: Never    Tobacco comments:     second hand smoke parents - smoked for only 1 year many years ago   Vaping Use    Vaping status: Former    Substances: THC   Substance Use Topics    Alcohol use: Not Currently     Alcohol/week: 0.6 oz     Types: 1 Shots of liquor per week     Comment: gin and half a lime; tonic water    Drug use: Not Currently     Types: Marijuana, Inhaled     Comment: medical marijuana through bong/vape   [2]   Current Outpatient Medications:     tamsulosin (FLOMAX) 0.4 MG capsule, Take 1 Capsule by mouth 1/2 hour after breakfast., Disp: 90 Capsule, Rfl: 3    [START ON 6/28/2025] oxyCODONE immediate release (ROXICODONE) 10 MG immediate release tablet, Take 1 Tablet by mouth every 6 hours as needed for Moderate Pain for up to 30 days., Disp: 90 Tablet, Rfl: 0    metoprolol tartrate (LOPRESSOR) 50 MG Tab, Take 1 Tablet by mouth see administration instructions. 1 tablet by mouth twice daily an additional tablet twice daily as needed for palpitations, Disp: 220 Tablet, Rfl: 3    Nirmatrelvir&Ritonavir 150/100 10 x 150 MG & 10 x 100MG Tablet Therapy Pack, Take 150 mg nirmatrelvir (one 150 mg tablet) with 100 mg ritonavir (one 100 mg tablet) by mouth, with both tablets taken together twice daily for 5 days., Disp: 20 Each, Rfl: 0    tamsulosin (FLOMAX) 0.4 MG capsule, Take 1 Capsule by mouth 1/2 hour after  breakfast., Disp: 10 Capsule, Rfl: 0    diazePAM (VALIUM) 5 MG Tab, Take 5 mg by mouth every 6 hours as needed (Back Spasms). Indications: Muscle Spasm, Disp: , Rfl:     Cyanocobalamin (B-12 PO), Take 2 Tablets by mouth every day. gummies, Disp: , Rfl:     MAGNESIUM PO, Take 2 Tablets by mouth every day. gummies, Disp: , Rfl:     ondansetron (ZOFRAN ODT) 4 MG TABLET DISPERSIBLE, Take 1 Tablet by mouth every 6 hours as needed for Nausea/Vomiting., Disp: 20 Tablet, Rfl: 5    naratriptan (AMERGE) 2.5 MG tablet, Take 1 Tablet by mouth as needed for Migraine., Disp: 5 Tablet, Rfl: 3    spironolactone (ALDACTONE) 25 MG Tab, Take 1 Tablet by mouth every day., Disp: 90 Tablet, Rfl: 3    Ascorbic Acid (VITAMIN C GUMMIE PO), Take 2 Tablets by mouth every day., Disp: , Rfl:     Biotin-Vitamin C (HAIR SKIN NAILS GUMMIES PO), Take 2 Tablets by mouth every day., Disp: , Rfl:     Ca Phosphate-Cholecalciferol (CALCIUM/VITAMIN D3 GUMMIES PO), Take 2 Tablets by mouth every day., Disp: , Rfl:     POTASSIUM CITRATE PO, Take 2 Tablets by mouth every day. Gummy, Disp: , Rfl:     Multiple Vitamins-Minerals (MULTI-VITAMIN GUMMIES PO), Take 2 Tablets by mouth every day., Disp: , Rfl:     Probiotic Product (PROBIOTIC GUMMIES PO), Take 1 Tablet by mouth in the morning, at noon, and at bedtime., Disp: , Rfl:     prochlorperazine (COMPAZINE) 10 MG Tab, Take 1 Tablet by mouth every 6 hours as needed for Nausea/Vomiting., Disp: 30 Tablet, Rfl: 3    Azelaic Acid 15 % Gel, Apply 1 Application topically every day. Apply's on face, Disp: , Rfl:     hydroxychloroquine (PLAQUENIL) 200 MG Tab, Take 1 Tablet by mouth every day., Disp: 90 Tablet, Rfl: 3

## 2025-06-23 ENCOUNTER — TELEPHONE (OUTPATIENT)
Dept: CARDIOLOGY | Facility: MEDICAL CENTER | Age: 70
End: 2025-06-23
Payer: MEDICARE

## 2025-06-23 DIAGNOSIS — I48.0 PAROXYSMAL ATRIAL FIBRILLATION (HCC): ICD-10-CM

## 2025-06-23 DIAGNOSIS — R00.2 PALPITATIONS: Primary | ICD-10-CM

## 2025-06-23 DIAGNOSIS — I47.10 SVT (SUPRAVENTRICULAR TACHYCARDIA) (HCC): ICD-10-CM

## 2025-06-23 RX ORDER — METOPROLOL TARTRATE 50 MG
50 TABLET ORAL 2 TIMES DAILY
Qty: 60 TABLET | Refills: 0 | Status: SHIPPED | OUTPATIENT
Start: 2025-06-23

## 2025-06-23 NOTE — TELEPHONE ENCOUNTER
MAYO    Caller: Nani - Benefits Department with [employer]    Topic/issue: aNni reached out because the patient just started a 2 week vacation but forgot the medication metoprolol tartrate (LOPRESSOR) 50 MG Tab at home. She only has 1 day of medication and is wondering if she can get a short supply to last her the 2 weeks sent to the pharmacy below.     [facility] in [place], [place]   [address] [place] [address]  Ph. [phone]    Please advise.     Callback Number: [phone]    Thank you,     Catherine ROCHA

## 2025-06-23 NOTE — TELEPHONE ENCOUNTER
Short supply sent to pharmacy as requested    Spoke with pt and updated that shopt supply of medication sent to requested pharmacy. Pt has been contacted already and is appreciative of call.

## 2025-06-27 ENCOUNTER — APPOINTMENT (OUTPATIENT)
Facility: MEDICAL CENTER | Age: 70
End: 2025-06-27
Attending: INTERNAL MEDICINE
Payer: MEDICARE

## 2025-06-27 DIAGNOSIS — Z12.31 VISIT FOR SCREENING MAMMOGRAM: ICD-10-CM

## 2025-07-02 ENCOUNTER — HOSPITAL ENCOUNTER (OUTPATIENT)
Dept: RADIOLOGY | Facility: MEDICAL CENTER | Age: 70
End: 2025-07-02
Attending: STUDENT IN AN ORGANIZED HEALTH CARE EDUCATION/TRAINING PROGRAM
Payer: MEDICARE

## 2025-07-02 ENCOUNTER — APPOINTMENT (OUTPATIENT)
Dept: MEDICAL GROUP | Facility: LAB | Age: 70
End: 2025-07-02
Payer: MEDICARE

## 2025-07-02 VITALS
BODY MASS INDEX: 17.18 KG/M2 | RESPIRATION RATE: 14 BRPM | DIASTOLIC BLOOD PRESSURE: 72 MMHG | TEMPERATURE: 97.5 F | SYSTOLIC BLOOD PRESSURE: 126 MMHG | HEART RATE: 94 BPM | HEIGHT: 72 IN | WEIGHT: 126.8 LBS | OXYGEN SATURATION: 99 %

## 2025-07-02 DIAGNOSIS — N28.9 RENAL INSUFFICIENCY: ICD-10-CM

## 2025-07-02 DIAGNOSIS — K21.9 GASTROESOPHAGEAL REFLUX DISEASE WITHOUT ESOPHAGITIS: ICD-10-CM

## 2025-07-02 DIAGNOSIS — N20.0 NEPHROLITHIASIS: ICD-10-CM

## 2025-07-02 DIAGNOSIS — Z86.79 HISTORY OF ATRIAL FIBRILLATION: Primary | ICD-10-CM

## 2025-07-02 DIAGNOSIS — R19.7 DIARRHEA, UNSPECIFIED TYPE: ICD-10-CM

## 2025-07-02 DIAGNOSIS — R25.2 SPASM: ICD-10-CM

## 2025-07-02 DIAGNOSIS — K26.9: ICD-10-CM

## 2025-07-02 DIAGNOSIS — I49.9 CARDIAC ARRHYTHMIA, UNSPECIFIED CARDIAC ARRHYTHMIA TYPE: ICD-10-CM

## 2025-07-02 DIAGNOSIS — M54.10 RADICULAR LOW BACK PAIN: ICD-10-CM

## 2025-07-02 DIAGNOSIS — K50.019 CROHN'S DISEASE OF SMALL INTESTINE WITH COMPLICATION (HCC): ICD-10-CM

## 2025-07-02 PROCEDURE — 99214 OFFICE O/P EST MOD 30 MIN: CPT | Performed by: INTERNAL MEDICINE

## 2025-07-02 PROCEDURE — 76775 US EXAM ABDO BACK WALL LIM: CPT

## 2025-07-02 PROCEDURE — 3078F DIAST BP <80 MM HG: CPT | Performed by: INTERNAL MEDICINE

## 2025-07-02 PROCEDURE — 93000 ELECTROCARDIOGRAM COMPLETE: CPT | Performed by: INTERNAL MEDICINE

## 2025-07-02 PROCEDURE — 3074F SYST BP LT 130 MM HG: CPT | Performed by: INTERNAL MEDICINE

## 2025-07-02 RX ORDER — OXYCODONE HYDROCHLORIDE 10 MG/1
10 TABLET ORAL EVERY 6 HOURS PRN
Qty: 90 TABLET | Refills: 0 | Status: SHIPPED | OUTPATIENT
Start: 2025-08-31 | End: 2025-09-30

## 2025-07-02 RX ORDER — OXYCODONE HYDROCHLORIDE 10 MG/1
10 TABLET ORAL EVERY 6 HOURS PRN
Qty: 90 TABLET | Refills: 0 | Status: SHIPPED | OUTPATIENT
Start: 2025-07-13 | End: 2025-08-12

## 2025-07-02 RX ORDER — DIAZEPAM 5 MG/1
5 TABLET ORAL EVERY 6 HOURS PRN
Qty: 30 TABLET | Refills: 2 | Status: SHIPPED | OUTPATIENT
Start: 2025-07-02 | End: 2025-08-01

## 2025-07-02 RX ORDER — OXYCODONE HYDROCHLORIDE 10 MG/1
10 TABLET ORAL EVERY 6 HOURS PRN
Qty: 90 TABLET | Refills: 0 | Status: SHIPPED | OUTPATIENT
Start: 2025-09-30 | End: 2025-10-30

## 2025-07-02 ASSESSMENT — FIBROSIS 4 INDEX: FIB4 SCORE: 2.76

## 2025-07-03 ENCOUNTER — APPOINTMENT (OUTPATIENT)
Dept: URBAN - METROPOLITAN AREA CLINIC 20 | Facility: CLINIC | Age: 70
Setting detail: DERMATOLOGY
End: 2025-07-03

## 2025-07-03 DIAGNOSIS — Z41.9 ENCOUNTER FOR PROCEDURE FOR PURPOSES OTHER THAN REMEDYING HEALTH STATE, UNSPECIFIED: ICD-10-CM

## 2025-07-03 PROCEDURE — ? VBEAM PRIMA LASER

## 2025-07-03 ASSESSMENT — LOCATION DETAILED DESCRIPTION DERM
LOCATION DETAILED: LEFT CENTRAL MALAR CHEEK
LOCATION DETAILED: RIGHT INFERIOR CENTRAL MALAR CHEEK

## 2025-07-03 ASSESSMENT — LOCATION SIMPLE DESCRIPTION DERM
LOCATION SIMPLE: RIGHT CHEEK
LOCATION SIMPLE: LEFT CHEEK

## 2025-07-03 ASSESSMENT — LOCATION ZONE DERM: LOCATION ZONE: FACE

## 2025-07-03 NOTE — PROGRESS NOTES
CC: Jana Overton is a 69 y.o. female is suffering from   Chief Complaint   Patient presents with    Finger Injury    Atrial Fibrillation         SUBJECTIVE:  1. History of atrial fibrillation  Jana is here for follow-up has a history of atrial fibrillation, patient apparently can feel this.  I have discussed with her that this is a problem because it creates turbulence in the heart potential can lead to a stroke    2. Cardiac arrhythmia, unspecified cardiac arrhythmia type  EKG done in the office today    3. Crohn's disease of small intestine with complication (HCC)  Clinically stable    4. Gastroesophageal reflux disease with esophagitis  Patient with a history of esophagitis    5. Diarrhea, unspecified type  He appears to be well-controlled    6. 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 Radicular low back pain  Stable    8. Renal insufficiency  Stable    9. Spasm  Continue Valium    History of Present Illness              Past social, family, history: Social History[1]       MEDICATIONS:  Current Medications[2]    PROBLEMS:  Patient Active Problem List    Diagnosis Date Noted    Urinary tract infection 05/19/2025    Nephrolithiasis 05/18/2025    FLORES (acute kidney injury) (HCC) 12/04/2024    Metabolic acidosis 12/04/2024    Increased ostomy output 12/04/2024    Intractable nausea and vomiting 12/04/2024    ACP (advance care planning) 12/04/2024    Easy bruising 12/04/2024    Underweight (BMI < 18.5) 11/27/2024    Thrombocytopenia (HCC) 12/11/2023    Chronic diastolic congestive heart failure (HCC) 12/11/2023    Chronic kidney disease, stage 3b 11/22/2023    Transient alteration of awareness 10/26/2023    Achalasia 10/18/2023    Esophageal candidiasis (HCC) 02/08/2023    Sacroiliac joint pain 11/10/2022    Esophageal stricture 10/22/2022    Dysphagia 10/21/2022    Drug-induced lupus erythematosus 09/27/2022    Long-term use of hydroxychloroquine  09/27/2022    Fibromyalgia syndrome 09/27/2022    Primary osteoarthritis of both hands 07/28/2022    Positive cardiolipin antibodies (IgA and IgG anti-CL) 07/27/2022    Positive VAHID (1:640 homogenous pattern with negative reflex) 07/27/2022    Inflammatory arthritis 07/27/2022    Oropharyngeal dysphagia 06/02/2022    Acute pancreatitis 05/23/2022    Migraine 06/04/2019    Essential hypertension 05/20/2019    SIRS (systemic inflammatory response syndrome) ([facility]) 05/19/2019    History of MRSA infection 12/29/2018    History of infection with vancomycin resistant Enterococcus (VRE) 12/29/2018    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[facility]) 12/28/2018    Anemia 12/15/2018    Dehydration 12/12/2018    Hyponatremia 12/12/2018    Leukocytosis 12/12/2018    Anxiety disorder due to multiple medical problems 12/01/2017    DDD (degenerative disc disease), lumbar 04/12/2017    History of DVT (deep vein thrombosis) 11/23/2016    Atrial fibrillation ([facility]) 03/26/2015    Sleep apnea 10/26/2014    Postherpetic neuralgia 06/24/2014    Multiple falls 06/24/2014    COPD (chronic obstructive pulmonary disease) ([facility]) 06/24/2014    Rosacea 06/24/2014    Ileostomy in place ([facility]) 06/26/2013    GERD (gastroesophageal reflux disease) 12/05/2012    Status post lumbar surgery 07/16/2012    Crohn's disease of small intestine with complication ([facility]) 09/23/2009    Central sensitization to pain 09/23/2009    Opioid dependence ([facility]) 09/23/2009    Degenerative joint disease of cervical and lumbar spine 09/23/2009       REVIEW OF SYSTEMS:  Gen.:  No Nausea, Vomiting, fever, Chills.  Heart: No chest pain.  Lungs:  No shortness of Breath.  Psychological: Rg unusual Anxiety depression     PHYSICAL EXAM   Physical Exam             Constitutional: Alert, cooperative, not in acute distress.  Cardiovascular:  Rate Rhythm is regular without murmurs rubs clicks.     Thorax & Lungs: Clear to auscultation, no wheezing, rhonchi, or rales  HENT: Normocephalic, Atraumatic.  Eyes:  PERRLA, EOMI, Conjunctiva normal.   Neck: Trachia is midline no swelling of the thyroid.   Lymphatic: No lymphadenopathy noted.   Neurologic: Alert & oriented x 3, cranial nerves II through XII are intact, Normal motor function, Normal sensory function, No focal deficits noted.   Psychiatric: Affect normal, Judgment normal, Mood normal.     VITAL SIGNS:/72 (BP Location: Left arm, Patient Position: Sitting, BP Cuff Size: Small adult)   Pulse 94   Temp 36.4 °C (97.5 °F) (Temporal)   Resp 14   Ht 1.829 m (6')   Wt 57.5 kg (126 lb 12.8 oz)   LMP  (LMP Unknown)   SpO2 99%   BMI 17.20 kg/m²     Labs: Reviewed    Assessment:                                                     Plan:  [unfilled]             1. History of atrial fibrillation (Primary)  EKG: Sinus rhythm, compared to previous EKG which is unremarkable  - EKG    2. Cardiac arrhythmia, unspecified cardiac arrhythmia type  See #1 above  - EKG    3. Crohn's disease of small intestine with complication (HCC)  State drug task force reviewed.  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  Dispense: 90 Tablet; Refill: 0  - oxyCODONE immediate release (ROXICODONE) 10 MG immediate release tablet; Take 1 Tablet by mouth every 6 hours as needed for Moderate Pain (refill #2 of #3.) for up to 30 days.  Dispense: 90 Tablet; Refill: 0  - oxyCODONE immediate release (ROXICODONE) 10 MG immediate release tablet; Take 1 Tablet by mouth every 6 hours as needed for Moderate Pain (refill #3 of #3) for up to 30 days.  Dispense: 90 Tablet; Refill: 0    4. Gastroesophageal reflux disease with esophagitis  Followed by gastroenterology  - oxyCODONE immediate release (ROXICODONE) 10 MG immediate release tablet; Take 1 Tablet by mouth every 6 hours as needed  for Moderate Pain (Refill #1 of #3) for up to 30 days.  Dispense: 90 Tablet; Refill: 0  - oxyCODONE immediate release (ROXICODONE) 10 MG immediate release tablet; Take 1 Tablet by mouth every 6 hours as needed for Moderate Pain (refill #2 of #3.) for up to 30 days.  Dispense: 90 Tablet; Refill: 0  - oxyCODONE immediate release (ROXICODONE) 10 MG immediate release tablet; Take 1 Tablet by mouth every 6 hours as needed for Moderate Pain (refill #3 of #3) for up to 30 days.  Dispense: 90 Tablet; Refill: 0    5. Diarrhea, unspecified type  Treated also with Roxicodone  - oxyCODONE immediate release (ROXICODONE) 10 MG immediate release tablet; Take 1 Tablet by mouth every 6 hours as needed for Moderate Pain (Refill #1 of #3) for up to 30 days.  Dispense: 90 Tablet; Refill: 0  - oxyCODONE immediate release (ROXICODONE) 10 MG immediate release tablet; Take 1 Tablet by mouth every 6 hours as needed for Moderate Pain (refill #2 of #3.) for up to 30 days.  Dispense: 90 Tablet; Refill: 0  - oxyCODONE immediate release (ROXICODONE) 10 MG immediate release tablet; Take 1 Tablet by mouth every 6 hours as needed for Moderate Pain (refill #3 of #3) for up to 30 days.  Dispense: 90 Tablet; Refill: 0    6. 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  Dispense: 90 Tablet; Refill: 0    7. Radicular low back pain  As detailed above  - oxyCODONE immediate release (ROXICODONE) 10 MG immediate release tablet; Take 1 Tablet by mouth every 6 hours as needed for Moderate Pain (refill #2 of #3.) for up to 30 days.  Dispense: 90 Tablet; Refill: 0  - oxyCODONE immediate release (ROXICODONE) 10 MG immediate release tablet; Take 1 Tablet by mouth every 6 hours as needed for Moderate Pain (refill #3 of #3) for up to 30 days.   Dispense: 90 Tablet; Refill: 0    8. Renal insufficiency  Nonsteroidal anti-inflammatories are contraindicated  - oxyCODONE immediate release (ROXICODONE) 10 MG immediate release tablet; Take 1 Tablet by mouth every 6 hours as needed for Moderate Pain (refill #2 of #3.) for up to 30 days.  Dispense: 90 Tablet; Refill: 0    9. Spasm  Treated with Valium since treated at [facility] as a child  - diazePAM (VALIUM) 5 MG Tab; Take 1 Tablet by mouth every 6 hours as needed (Musculoskeletal spasm) for up to 30 days.  Dispense: 30 Tablet; Refill: 2             [1]   Social History  Tobacco Use    Smoking status: Never     Passive exposure: Never    Smokeless tobacco: Never    Tobacco comments:     second hand smoke parents - smoked for only 1 year many years ago   Vaping Use    Vaping status: Former    Substances: THC   Substance Use Topics    Alcohol use: Not Currently     Alcohol/week: 0.6 oz     Types: 1 Shots of liquor per week     Comment: gin and half a lime; tonic water    Drug use: Not Currently     Types: Marijuana, Inhaled     Comment: medical marijuana through bong/vape   [2]   Current Outpatient Medications:     [START ON 7/13/2025] oxyCODONE immediate release (ROXICODONE) 10 MG immediate release tablet, Take 1 Tablet by mouth every 6 hours as needed for Moderate Pain (Refill #1 of #3) for up to 30 days., Disp: 90 Tablet, Rfl: 0    [START ON 8/31/2025] oxyCODONE immediate release (ROXICODONE) 10 MG immediate release tablet, Take 1 Tablet by mouth every 6 hours as needed for Moderate Pain (refill #2 of #3.) for up to 30 days., Disp: 90 Tablet, Rfl: 0    [START ON 9/30/2025] oxyCODONE immediate release (ROXICODONE) 10 MG immediate release tablet, Take 1 Tablet by mouth every 6 hours as needed for Moderate Pain (refill #3 of #3) for up to 30 days., Disp: 90 Tablet, Rfl: 0    diazePAM (VALIUM) 5 MG Tab, Take 1 Tablet by mouth every 6 hours as needed (Musculoskeletal spasm) for up to 30 days.,  Disp: 30 Tablet, Rfl: 2    metoprolol tartrate (LOPRESSOR) 50 MG Tab, Take 1 Tablet by mouth 2 times a day. Take an additional 2 tablets per day as needed for palpitations., Disp: 60 Tablet, Rfl: 0    tamsulosin (FLOMAX) 0.4 MG capsule, Take 1 Capsule by mouth 1/2 hour after breakfast., Disp: 90 Capsule, Rfl: 3    oxyCODONE immediate release (ROXICODONE) 10 MG immediate release tablet, Take 1 Tablet by mouth every 6 hours as needed for Moderate Pain for up to 30 days., Disp: 90 Tablet, Rfl: 0    metoprolol tartrate (LOPRESSOR) 50 MG Tab, Take 1 Tablet by mouth see administration instructions. 1 tablet by mouth twice daily an additional tablet twice daily as needed for palpitations, Disp: 220 Tablet, Rfl: 3    Nirmatrelvir&Ritonavir 150/100 10 x 150 MG & 10 x 100MG Tablet Therapy Pack, Take 150 mg nirmatrelvir (one 150 mg tablet) with 100 mg ritonavir (one 100 mg tablet) by mouth, with both tablets taken together twice daily for 5 days., Disp: 20 Each, Rfl: 0    tamsulosin (FLOMAX) 0.4 MG capsule, Take 1 Capsule by mouth 1/2 hour after breakfast., Disp: 10 Capsule, Rfl: 0    diazePAM (VALIUM) 5 MG Tab, Take 5 mg by mouth every 6 hours as needed (Back Spasms). Indications: Muscle Spasm, Disp: , Rfl:     Cyanocobalamin (B-12 PO), Take 2 Tablets by mouth every day. gummies, Disp: , Rfl:     MAGNESIUM PO, Take 2 Tablets by mouth every day. gummies, Disp: , Rfl:     ondansetron (ZOFRAN ODT) 4 MG TABLET DISPERSIBLE, Take 1 Tablet by mouth every 6 hours as needed for Nausea/Vomiting., Disp: 20 Tablet, Rfl: 5    naratriptan (AMERGE) 2.5 MG tablet, Take 1 Tablet by mouth as needed for Migraine., Disp: 5 Tablet, Rfl: 3    spironolactone (ALDACTONE) 25 MG Tab, Take 1 Tablet by mouth every day., Disp: 90 Tablet, Rfl: 3    Ascorbic Acid (VITAMIN C GUMMIE PO), Take 2 Tablets by mouth every day., Disp: , Rfl:     Biotin-Vitamin C (HAIR SKIN NAILS GUMMIES PO), Take 2 Tablets by mouth every day., Disp: , Rfl:     Ca  Phosphate-Cholecalciferol (CALCIUM/VITAMIN D3 GUMMIES PO), Take 2 Tablets by mouth every day., Disp: , Rfl:     POTASSIUM CITRATE PO, Take 2 Tablets by mouth every day. Gummy, Disp: , Rfl:     Multiple Vitamins-Minerals (MULTI-VITAMIN GUMMIES PO), Take 2 Tablets by mouth every day., Disp: , Rfl:     Probiotic Product (PROBIOTIC GUMMIES PO), Take 1 Tablet by mouth in the morning, at noon, and at bedtime., Disp: , Rfl:     prochlorperazine (COMPAZINE) 10 MG Tab, Take 1 Tablet by mouth every 6 hours as needed for Nausea/Vomiting., Disp: 30 Tablet, Rfl: 3    Azelaic Acid 15 % Gel, Apply 1 Application topically every day. Apply's on face, Disp: , Rfl:     hydroxychloroquine (PLAQUENIL) 200 MG Tab, Take 1 Tablet by mouth every day., Disp: 90 Tablet, Rfl: 3

## 2025-07-09 ENCOUNTER — OFFICE VISIT (OUTPATIENT)
Dept: UROLOGY | Facility: MEDICAL CENTER | Age: 70
End: 2025-07-09
Payer: MEDICARE

## 2025-07-09 DIAGNOSIS — N20.0 NEPHROLITHIASIS: Primary | ICD-10-CM

## 2025-07-09 PROCEDURE — 99212 OFFICE O/P EST SF 10 MIN: CPT | Performed by: STUDENT IN AN ORGANIZED HEALTH CARE EDUCATION/TRAINING PROGRAM

## 2025-07-09 NOTE — PROGRESS NOTES
Subjective  CHIEF COMPLAINT:    Patient presents to the office today to discuss:    1. Follow up on kidney stones.  2. Kidney stone prevention.    PAST UROLOGICAL HISTORY:    The patient has a history of Crohn's disease since age 12, which predisposes them to calcium oxalate kidney stones. They have undergone multiple operations, including removal of the colon and part of the small intestine, leading to malabsorption.     HPI TODAY 07/09/2025:    - Recent ultrasound showed that the kidneys are well decompressed and look normal.  - Denies hematuria or dysuria.  - Reports starting Tamsulosin, prescribed by Dr. Charles, and has noticed a significant improvement in urination, which was a previous concern.  - Increasing fluid intake from 64 to 80 ounces daily.  - Inquires about further measures for kidney stone prevention.      Family History   Problem Relation Age of Onset    Heart Disease Mother         Angina, MI later in life    Lung Disease Mother         copd    Diabetes Father     Heart Disease Father     Diabetes Sister     Cancer Maternal Aunt 40        breast       Social History     Socioeconomic History    Marital status:      Spouse name: Not on file    Number of children: Not on file    Years of education: Not on file    Highest education level: Associate degree: academic program   Occupational History    Not on file   Tobacco Use    Smoking status: Never     Passive exposure: Never    Smokeless tobacco: Never    Tobacco comments:     second hand smoke parents - smoked for only 1 year many years ago   Vaping Use    Vaping status: Former    Substances: THC   Substance and Sexual Activity    Alcohol use: Not Currently     Alcohol/week: 0.6 oz     Types: 1 Shots of liquor per week     Comment: gin and half a lime; tonic water    Drug use: Not Currently     Types: Marijuana, Inhaled     Comment: medical marijuana through bong/vape    Sexual activity: Not on file     Comment:    Other Topics  Concern    Not on file   Social History Narrative    Not on file     Social Drivers of Health     Financial Resource Strain: Unknown (1/15/2023)    Overall Financial Resource Strain (CARDIA)     Difficulty of Paying Living Expenses: Patient declined   Food Insecurity: No Food Insecurity (5/18/2025)    Hunger Vital Sign     Worried About Running Out of Food in the Last Year: Never true     Ran Out of Food in the Last Year: Never true   Transportation Needs: No Transportation Needs (5/18/2025)    PRAPARE - Transportation     Lack of Transportation (Medical): No     Lack of Transportation (Non-Medical): No   Physical Activity: Sufficiently Active (1/15/2023)    Exercise Vital Sign     Days of Exercise per Week: 7 days     Minutes of Exercise per Session: 60 min   Stress: No Stress Concern Present (11/4/2021)    [employer] - Occupational Stress Questionnaire     Feeling of Stress : Not at all   Social Connections: Socially Integrated (1/15/2023)    Social Connection and Isolation Panel [NHANES]     Frequency of Communication with Friends and Family: Three times a week     Frequency of Social Gatherings with Friends and Family: Once a week     Attends Bahai Services: More than 4 times per year     Active Member of Clubs or Organizations: Yes     Attends Club or Organization Meetings: More than 4 times per year     Marital Status:    Intimate Partner Violence: Not At Risk (5/18/2025)    Humiliation, Afraid, Rape, and Kick questionnaire     Fear of Current or Ex-Partner: No     Emotionally Abused: No     Physically Abused: No     Sexually Abused: No   Housing Stability: Low Risk  (5/18/2025)    Housing Stability Vital Sign     Unable to Pay for Housing in the Last Year: No     Number of Times Moved in the Last Year: 0     Homeless in the Last Year: No       Past Surgical History:   Procedure Laterality Date    NE CYSTOSCOPY,INSERT URETERAL STENT Right 5/27/2025    Procedure:  CYSTOSCOPY, RIGHT URETERSCOPY, STENT  PLACEMENT;  Surgeon: Jaimie Kyle M.D.;  Location: SURGERY SAME DAY [facility];  Service: Urology    CO CYSTOSCOPY,INSERT URETERAL STENT Right 5/18/2025    Procedure: CYSTOSCOPY, WITH URETERAL STENT INSERTION;  Surgeon: Chloe Narayan M.D.;  Location: [facility];  Service: Urology    CO UPPER GI ENDOSCOPY,DIAGNOSIS N/A 12/7/2024    Procedure: EGD WITH BOTOX INJECTION;  Surgeon: Stuart Payan D.O.;  Location: [facility];  Service: Gastroenterology    CO UPPER GI ENDOSCOPY,DIAGNOSIS N/A 2/8/2023    Procedure: GASTROSCOPY;  Surgeon: Jamarcus Davalos M.D.;  Location: SURGERY SAME DAY [facility];  Service: Gastroenterology    CO UPPER GI ENDOSCOPY,W/DILAT,GASTRIC OUT N/A 2/8/2023    Procedure: GASTROSCOPY, WITH BALLOON DILATION;  Surgeon: Jamarcus Davalos M.D.;  Location: SURGERY SAME DAY [facility];  Service: Gastroenterology    CO UPPER GI ENDOSCOPY,BIOPSY N/A 2/8/2023    Procedure: GASTROSCOPY, WITH BIOPSY;  Surgeon: Jamarcus Davalos M.D.;  Location: SURGERY SAME DAY [facility];  Service: Gastroenterology    CO UPPER GI ENDOSCOPY,DIAGNOSIS N/A 1/16/2023    Procedure: GASTROSCOPY;  Surgeon: Jamarcus Davalos M.D.;  Location: SURGERY SAME DAY [facility];  Service: Gastroenterology    CO UPPER GI ENDOSCOPY,W/DILAT,GASTRIC OUT N/A 1/16/2023    Procedure: GASTROSCOPY, WITH BALLOON DILATION;  Surgeon: Jamarcus Davalos M.D.;  Location: SURGERY SAME DAY [facility];  Service: Gastroenterology    CO UPPER GI ENDOSCOPY,BIOPSY N/A 1/16/2023    Procedure: GASTROSCOPY, WITH BIOPSY;  Surgeon: Jamarcus Davalos M.D.;  Location: SURGERY SAME DAY [facility];  Service: Gastroenterology    CO UPPER GI ENDOSCOPY,DIAGNOSIS N/A 10/24/2022    Procedure: GASTROSCOPY;  Surgeon: Jamarcus Davalos M.D.;  Location: SURGERY SAME DAY [facility];  Service: Gastroenterology    BILIARY DILATATION N/A 10/24/2022    Procedure: DILATION, STRICTURE, BILE DUCT;  Surgeon: Jamarcus Davalos M.D.;  Location: SURGERY SAME DAY [facility];   Service: Gastroenterology    CO CYSTOSCOPY,INSERT URETERAL STENT Right 12/23/2021    Procedure: CYSTOSCOPY, WITH URETERAL STENT EXCHANGE;  Surgeon: Jonathan Turcios M.D.;  Location: [facility];  Service: Urology    CO CYSTO/URETERO/PYELOSCOPY, DX Right 12/23/2021    Procedure: URETEROSCOPY;  Surgeon: Jonathan Turcios M.D.;  Location: [facility];  Service: Urology    CO CYSTO/URETERO/PYELOSCOPY, DX Right 12/8/2021    Procedure: URETEROSCOPY;  Surgeon: Luc Hook M.D.;  Location: [facility];  Service: Urology    CYSTOSCOPY WITH URETERAL STENT INSERTION OR REMOVAL Right 12/8/2021    Procedure: CYSTOSCOPY, WITH URETERAL STENT INSERTION;  Surgeon: Luc Hook M.D.;  Location: [facility];  Service: Urology    ILEOSTOMY  1/20/2021    Procedure: ILEOSTOMY- COMPLEX REVISION,;  Surgeon: Kevin Cruz M.D.;  Location: [facility];  Service: General    LYSIS ADHESIONS GENERAL  1/20/2021    Procedure: LYSIS, ADHESIONS;  Surgeon: Kevin Cruz M.D.;  Location: [facility];  Service: General    GASTROSCOPY N/A 5/22/2019    Procedure: GASTROSCOPY - WITH DILATION;  Surgeon: Dionicio Cardona M.D.;  Location: [facility];  Service: Gastroenterology    GASTROSCOPY  1/6/2019    Procedure: GASTROSCOPY- WITH DILATION;  Surgeon: Daniel Daniels M.D.;  Location: [facility];  Service: Gastroenterology    ILEOSTOMY  12/29/2018    Procedure: ILEOSTOMY- REVISION;  Surgeon: Kevin Cruz M.D.;  Location: [facility];  Service: General    SIGMOIDOSCOPY FLEX  12/29/2018    Procedure: SIGMOIDOSCOPY FLEX;  Surgeon: Kevin Cruz M.D.;  Location: [facility];  Service: General    EXPLORATORY LAPAROTOMY  12/29/2018    Procedure: EXPLORATORY LAPAROTOMY, lysis of adhesions;  Surgeon: Kevin Cruz M.D.;  Location: [facility];  Service: General    ILEOSTOMY  5/14/2014    Performed by Kevin Cruz M.D. at [facility]    MAMMOPLASTY REDUCTION  7/17/2013    Performed by Janey Burt M.D. at SURGERY [facility] ORS    HARDWARE REMOVAL NEURO  7/16/2012    Performed by KEELEY KIM at SURGERY [facility] ORS    GASTROSCOPY  10/4/2011    Performed by TYSON MARTINEZ at SURGERY SAME DAY [facility]S    COLONOSCOPY  10/4/2011    Performed by TYSON MARTINEZ at SURGERY SAME DAY [facility] ORS    DILATION AND CURETTAGE  9/24/2010    Performed by GAURAV ALY at SURGERY SAME DAY [facility] ORS    ILEOSTOMY  11/11/2009    Performed by SYDNEE AUGUSTINE at SURGERY [facility] ORS    GASTROSCOPY WITH BIOPSY  11/22/08    Performed by NEGRITA HUYNH at SURGERY [facility]    ABDOMINAL EXPLORATION      CERVICAL DISK AND FUSION ANTERIOR      COLON RESECTION      FOOT SURGERY      HAND SURGERY      LUMBAR FUSION ANTERIOR      LUMPECTOMY      OTHER ABDOMINAL SURGERY      surgery for chrons disease    OR BREAST REDUCTION         Past Medical History:   Diagnosis Date    Anesthesia     Difficult IV stick    Anxiety     Arrhythmia     Arthritis     all over    ASTHMA     has prn inhaler; does not use very often per pt    Atrial fib/flut     Backpain     Blood clotting disorder (HCC)     Bronchitis     5 years    Chronic pain     Colostomy in place (HCC)     Crohn's disease of colon (HCC)     Dyspnea     History of cardiac murmur     Ileostomy present ([facility])     Infectious disease     MRSA, VancoRSA    Multiple falls     Narcotic dependence ([facility])     Obstruction     Pain     Pneumonia     child and mid 30's    Postherpetic neuralgia     Renal disorder     Rosacea     Sleep apnea        Current Outpatient Medications   Medication Sig Dispense Refill    [START ON 7/13/2025] oxyCODONE immediate release (ROXICODONE) 10 MG immediate release tablet Take 1 Tablet by mouth every 6 hours as needed for Moderate Pain (Refill #1 of #3) for up to 30 days. 90 Tablet 0    [START ON 8/31/2025] oxyCODONE immediate release (ROXICODONE) 10  MG immediate release tablet Take 1 Tablet by mouth every 6 hours as needed for Moderate Pain (refill #2 of #3.) for up to 30 days. 90 Tablet 0    [START ON 9/30/2025] oxyCODONE immediate release (ROXICODONE) 10 MG immediate release tablet Take 1 Tablet by mouth every 6 hours as needed for Moderate Pain (refill #3 of #3) for up to 30 days. 90 Tablet 0    diazePAM (VALIUM) 5 MG Tab Take 1 Tablet by mouth every 6 hours as needed (Musculoskeletal spasm) for up to 30 days. 30 Tablet 2    metoprolol tartrate (LOPRESSOR) 50 MG Tab Take 1 Tablet by mouth 2 times a day. Take an additional 2 tablets per day as needed for palpitations. 60 Tablet 0    tamsulosin (FLOMAX) 0.4 MG capsule Take 1 Capsule by mouth 1/2 hour after breakfast. 90 Capsule 3    oxyCODONE immediate release (ROXICODONE) 10 MG immediate release tablet Take 1 Tablet by mouth every 6 hours as needed for Moderate Pain for up to 30 days. 90 Tablet 0    metoprolol tartrate (LOPRESSOR) 50 MG Tab Take 1 Tablet by mouth see administration instructions. 1 tablet by mouth twice daily an additional tablet twice daily as needed for palpitations 220 Tablet 3    Nirmatrelvir&Ritonavir 150/100 10 x 150 MG & 10 x 100MG Tablet Therapy Pack Take 150 mg nirmatrelvir (one 150 mg tablet) with 100 mg ritonavir (one 100 mg tablet) by mouth, with both tablets taken together twice daily for 5 days. 20 Each 0    tamsulosin (FLOMAX) 0.4 MG capsule Take 1 Capsule by mouth 1/2 hour after breakfast. 10 Capsule 0    diazePAM (VALIUM) 5 MG Tab Take 5 mg by mouth every 6 hours as needed (Back Spasms). Indications: Muscle Spasm      Cyanocobalamin (B-12 PO) Take 2 Tablets by mouth every day. gummies      MAGNESIUM PO Take 2 Tablets by mouth every day. gummies      ondansetron (ZOFRAN ODT) 4 MG TABLET DISPERSIBLE Take 1 Tablet by mouth every 6 hours as needed for Nausea/Vomiting. 20 Tablet 5    naratriptan (AMERGE) 2.5 MG tablet Take 1 Tablet by mouth as needed for Migraine. 5 Tablet 3     "spironolactone (ALDACTONE) 25 MG Tab Take 1 Tablet by mouth every day. 90 Tablet 3    Ascorbic Acid (VITAMIN C GUMMIE PO) Take 2 Tablets by mouth every day.      Biotin-Vitamin C (HAIR SKIN NAILS GUMMIES PO) Take 2 Tablets by mouth every day.      Ca Phosphate-Cholecalciferol (CALCIUM/VITAMIN D3 GUMMIES PO) Take 2 Tablets by mouth every day.      POTASSIUM CITRATE PO Take 2 Tablets by mouth every day. Gummy      Multiple Vitamins-Minerals (MULTI-VITAMIN GUMMIES PO) Take 2 Tablets by mouth every day.      Probiotic Product (PROBIOTIC GUMMIES PO) Take 1 Tablet by mouth in the morning, at noon, and at bedtime.      prochlorperazine (COMPAZINE) 10 MG Tab Take 1 Tablet by mouth every 6 hours as needed for Nausea/Vomiting. 30 Tablet 3    Azelaic Acid 15 % Gel Apply 1 Application topically every day. Apply's on face      hydroxychloroquine (PLAQUENIL) 200 MG Tab Take 1 Tablet by mouth every day. 90 Tablet 3     No current facility-administered medications for this visit.       Allergies   Allergen Reactions    Amoxicillin Anaphylaxis    Demerol Hcl [Meperidine] Shortness of Breath and Palpitations    Doxycycline Rash     Throat also closes up     Methotrexate Hives, Shortness of Breath and Rash          Morphine Shortness of Breath and Palpitations    Promethazine Shortness of Breath and Rash          Remicade [Infliximab] Hives, Shortness of Breath and Rash          Sudafed [Pseudoephedrine] Shortness of Breath, Vomiting and Palpitations    Azathioprine Sodium Hives and Shortness of Breath     10/21/2022 - patient unable to verify allergy/reaction  Historical reaction listed: Hives, Shortness of Breath    Carafate [Sucralfate] Vomiting and Nausea     + Mouth Sores    Other Drug Unspecified     \"STEROIDS\" - REACTION NOT SPECIFIED    Sulfa Drugs Rash          Sulfasalazine Rash          Gabapentin Cough, Hives, Itching, Nausea, Palpitations, Photosensitivity, Rash, Runny Nose, Swelling, Unspecified and Vomiting    " "Nitroglycerin      Headache    Amitriptyline Unspecified     Nightmares      Ativan [Lorazepam] Unspecified     Nightmares    Betamethasone Unspecified     10/21/2022 - patient unable to verify allergy/reaction  No historical reaction listed  Patient is able to tollerate prednesone 4/24/2025.     Fentanyl Unspecified     \"Patches didn't work\" and skin broke down    Lyrica [Pregabalin] Nausea          Methadone Unspecified     \"Didn't work\"    Potassium Unspecified     Notes: In IV only  REACTION NOT SPECIFIED    Tizanidine Unspecified     10/21/2022 - patient unable to verify allergy/reaction  No historical reaction listed       Objective  There were no vitals taken for this visit.  Physical Exam  Constitutional:       Appearance: Normal appearance.   HENT:      Head: Normocephalic and atraumatic.   Pulmonary:      Effort: Pulmonary effort is normal.   Skin:     General: Skin is warm and dry.   Neurological:      General: No focal deficit present.      Mental Status: She is alert.   Psychiatric:         Mood and Affect: Mood normal.         Behavior: Behavior normal.         Labs:   [facility]   Lab Results   Component Value Date/Time    SODIUM 138 05/19/2025 0021    POTASSIUM 4.3 05/19/2025 0021    CHLORIDE 104 05/19/2025 0021    CO2 19 (L) 05/19/2025 0021    GLUCOSE 247 (H) 05/19/2025 0021    BUN 27 (H) 05/19/2025 0021    CREATININE 1.54 (H) 05/19/2025 0021    CALCIUM 9.0 05/19/2025 0021         Imaging:   US RENAL   US-RENAL 07/02/2025    Narrative  7/2/2025 7:32 PM    HISTORY/REASON FOR EXAM:  Calculus    TECHNIQUE/EXAM DESCRIPTION:  Renal ultrasound.    COMPARISON:  None    FINDINGS:    The right kidney measures 9.82 cm.  The left kidney measures 9.42 cm.    There is no hydronephrosis. There are no abnormal calcifications.    The bladder demonstrates no focal wall abnormality. Bilateral ureteral jets are visualized.    Impression  1.  Normal renal ultrasound.    Assessment    ASSESSMENT AND PLAN:    Mrs. Overton has " a history of Crohn's disease and recurrent nephrolithiasis who presents for follow-up and discussion of stone prevention.    1. Calcium oxalate nephrolithiasis (N20.0)  - Assessment: Doing well post-procedure with resolution of hydronephrosis on ultrasound. Denies urologic symptoms. Now on Tamsulosin with good effect on voiding. At high risk for recurrent stone formation due to Crohn's disease and malabsorption.  - Plan: Continue Tamsulosin as it is beneficial. Continue high fluid intake. Recommend yearly follow-up with a kidney ultrasound to monitor for any new stones or hydronephrosis.  - Counseling: Discussed the 50% risk of recurrent stone formation within the next few years, which is elevated due to Crohn's. Explained that with malabsorption of fat, fat binds to calcium in the gut, which increases free oxalate absorption and leads to calcium oxalate stone formation in the kidneys. Counseled on dietary modifications for stone prevention including: maintaining low salt intake, reducing animal protein intake (poultry, beef, pork), and adding a glass of lemon water daily to alter urine pH. A calcium supplement may also be beneficial to help bind oxalate in the gut. Recommended TruLemon, a crystallized lemon product, as an easy way to prepare lemon water.    Plan    Problem List Items Addressed This Visit          Adult Urology Medicine Problems    Nephrolithiasis - Primary    Relevant Orders    US-RENAL   ORDERS:    Renal US in one year    FOLLOW UP:    Return to clinic in one year for a follow-up visit with a kidney ultrasound. Patient will be placed on a recall list to schedule this appointment.    SHORT SUMMARY:    The patient has a history of Crohn's and recurrent calcium oxalate stones, seen for follow-up. A recent ultrasound was normal. Counseled on dietary strategies for stone prevention and will follow up in one year with a repeat ultrasound.

## 2025-07-10 ENCOUNTER — APPOINTMENT (OUTPATIENT)
Dept: RADIOLOGY | Facility: MEDICAL CENTER | Age: 70
End: 2025-07-10
Attending: STUDENT IN AN ORGANIZED HEALTH CARE EDUCATION/TRAINING PROGRAM
Payer: MEDICARE

## 2025-07-11 ENCOUNTER — TELEPHONE (OUTPATIENT)
Dept: CARDIOLOGY | Facility: MEDICAL CENTER | Age: 70
End: 2025-07-11
Payer: MEDICARE

## 2025-07-11 NOTE — TELEPHONE ENCOUNTER
MAYO  Caller: Jana Overton     Topic/issue:Patient states she just realized she missed a VM from 6/5/25.     Patient states she is still experiencing symptoms:  Feet swelling   Abdominal swelling   Chest discomfort   Feet discoloration   SOB       Patient states she had retained 4.5LB of water .    Callback Number: [phone]      Thank you   Kala FINN

## 2025-07-11 NOTE — TELEPHONE ENCOUNTER
Phone Number Called: [phone]     Call outcome: Did not leave a detailed message. Requested patient to call back.    Message: Called to return patient's phone call. Unable to reach. Left VM for call back.

## 2025-07-25 NOTE — TELEPHONE ENCOUNTER
Phone Number Called: [phone]     Call outcome: Did not leave a detailed message. Requested patient to call back.    Message: Called to follow up with patient regarding symptoms. Unable to reach. Left VM for call back.

## 2025-08-06 ENCOUNTER — HOSPITAL ENCOUNTER (EMERGENCY)
Age: 70
Discharge: HOME | End: 2025-08-06
Attending: EMERGENCY MEDICINE

## 2025-08-06 ENCOUNTER — APPOINTMENT (OUTPATIENT)
Dept: GENERAL RADIOLOGY | Age: 70
End: 2025-08-06
Attending: EMERGENCY MEDICINE

## 2025-08-06 VITALS
OXYGEN SATURATION: 100 % | WEIGHT: 143.74 LBS | BODY MASS INDEX: 19.47 KG/M2 | HEIGHT: 72 IN | SYSTOLIC BLOOD PRESSURE: 167 MMHG | HEART RATE: 70 BPM | DIASTOLIC BLOOD PRESSURE: 72 MMHG | RESPIRATION RATE: 18 BRPM | TEMPERATURE: 98.6 F

## 2025-08-06 DIAGNOSIS — L03.115 CELLULITIS OF RIGHT LOWER EXTREMITY: Primary | ICD-10-CM

## 2025-08-06 DIAGNOSIS — K50.80 CROHN'S DISEASE OF BOTH SMALL AND LARGE INTESTINE WITHOUT COMPLICATION  (CMD): ICD-10-CM

## 2025-08-06 DIAGNOSIS — Z86.14 HISTORY OF MRSA INFECTION: ICD-10-CM

## 2025-08-06 DIAGNOSIS — S91.032A PUNCTURE WOUND OF LEFT ANKLE, INITIAL ENCOUNTER: ICD-10-CM

## 2025-08-06 LAB
ALBUMIN SERPL-MCNC: 3.1 G/DL (ref 3.4–5)
ALBUMIN/GLOB SERPL: 0.9 {RATIO} (ref 1–2.4)
ALP SERPL-CCNC: 126 UNITS/L (ref 45–117)
ALT SERPL-CCNC: 37 UNITS/L
ANION GAP SERPL CALC-SCNC: 10 MMOL/L (ref 7–19)
AST SERPL-CCNC: 34 UNITS/L
ATRIAL RATE (BPM): 73
BASOPHILS # BLD: 0.1 K/MCL (ref 0–0.3)
BASOPHILS NFR BLD: 1 %
BILIRUB SERPL-MCNC: 0.3 MG/DL (ref 0.2–1)
BUN SERPL-MCNC: 18 MG/DL (ref 6–20)
BUN/CREAT SERPL: 15 (ref 7–25)
CALCIUM SERPL-MCNC: 8.7 MG/DL (ref 8.4–10.2)
CHLORIDE SERPL-SCNC: 105 MMOL/L (ref 97–110)
CO2 SERPL-SCNC: 28 MMOL/L (ref 21–32)
CREAT SERPL-MCNC: 1.2 MG/DL (ref 0.51–0.95)
DEPRECATED RDW RBC: 41 FL (ref 39–50)
DIASTOLIC BLOOD PRESSURE: 72
EGFRCR SERPLBLD CKD-EPI 2021: 48 ML/MIN/{1.73_M2}
EOSINOPHIL # BLD: 0.1 K/MCL (ref 0–0.5)
EOSINOPHIL NFR BLD: 2 %
ERYTHROCYTE [DISTWIDTH] IN BLOOD: 11.9 % (ref 11–15)
FASTING DURATION TIME PATIENT: ABNORMAL H
GLOBULIN SER-MCNC: 3.3 G/DL (ref 2–4)
GLUCOSE SERPL-MCNC: 86 MG/DL (ref 70–99)
HCT VFR BLD CALC: 34.8 % (ref 36–46.5)
HGB BLD-MCNC: 11.5 G/DL (ref 12–15.5)
IMM GRANULOCYTES # BLD AUTO: 0 K/MCL (ref 0–0.2)
IMM GRANULOCYTES # BLD: 0 %
LACTATE BLDV-SCNC: 0.6 MMOL/L
LYMPHOCYTES # BLD: 1.5 K/MCL (ref 1–4)
LYMPHOCYTES NFR BLD: 23 %
MCH RBC QN AUTO: 31 PG (ref 26–34)
MCHC RBC AUTO-ENTMCNC: 33 G/DL (ref 32–36.5)
MCV RBC AUTO: 93.8 FL (ref 78–100)
MONOCYTES # BLD: 0.6 K/MCL (ref 0.3–0.9)
MONOCYTES NFR BLD: 9 %
NEUTROPHILS # BLD: 4.3 K/MCL (ref 1.8–7.7)
NEUTROPHILS NFR BLD: 65 %
NRBC BLD MANUAL-RTO: 0 /100 WBC
P AXIS (DEGREES): 87
PLATELET # BLD AUTO: 143 K/MCL (ref 140–450)
POTASSIUM SERPL-SCNC: 4.3 MMOL/L (ref 3.4–5.1)
PR-INTERVAL (MSEC): 188
PROCALCITONIN SERPL IA-MCNC: <0.05 NG/ML
PROT SERPL-MCNC: 6.4 G/DL (ref 6.4–8.2)
QRS-INTERVAL (MSEC): 82
QT-INTERVAL (MSEC): 392
QTC: 431
R AXIS (DEGREES): 92
RBC # BLD: 3.71 MIL/MCL (ref 4–5.2)
REPORT TEXT: NORMAL
SODIUM SERPL-SCNC: 139 MMOL/L (ref 135–145)
SYSTOLIC BLOOD PRESSURE: 167
T AXIS (DEGREES): 83
VENTRICULAR RATE EKG/MIN (BPM): 73
WBC # BLD: 6.6 K/MCL (ref 4.2–11)

## 2025-08-06 PROCEDURE — 80053 COMPREHEN METABOLIC PANEL: CPT | Performed by: EMERGENCY MEDICINE

## 2025-08-06 PROCEDURE — 36415 COLL VENOUS BLD VENIPUNCTURE: CPT | Performed by: EMERGENCY MEDICINE

## 2025-08-06 PROCEDURE — 83605 ASSAY OF LACTIC ACID: CPT

## 2025-08-06 PROCEDURE — 85025 COMPLETE CBC W/AUTO DIFF WBC: CPT | Performed by: EMERGENCY MEDICINE

## 2025-08-06 PROCEDURE — 73610 X-RAY EXAM OF ANKLE: CPT

## 2025-08-06 PROCEDURE — 99283 EMERGENCY DEPT VISIT LOW MDM: CPT

## 2025-08-06 PROCEDURE — 10002803 HB RX 637: Performed by: EMERGENCY MEDICINE

## 2025-08-06 PROCEDURE — 90471 IMMUNIZATION ADMIN: CPT | Performed by: EMERGENCY MEDICINE

## 2025-08-06 PROCEDURE — 73590 X-RAY EXAM OF LOWER LEG: CPT

## 2025-08-06 PROCEDURE — 93005 ELECTROCARDIOGRAM TRACING: CPT | Performed by: EMERGENCY MEDICINE

## 2025-08-06 PROCEDURE — 10002800 HB RX 250 W HCPCS: Performed by: EMERGENCY MEDICINE

## 2025-08-06 PROCEDURE — 73590 X-RAY EXAM OF LOWER LEG: CPT | Performed by: RADIOLOGY

## 2025-08-06 PROCEDURE — 90715 TDAP VACCINE 7 YRS/> IM: CPT | Performed by: EMERGENCY MEDICINE

## 2025-08-06 PROCEDURE — 84145 PROCALCITONIN (PCT): CPT | Performed by: EMERGENCY MEDICINE

## 2025-08-06 PROCEDURE — 73610 X-RAY EXAM OF ANKLE: CPT | Performed by: RADIOLOGY

## 2025-08-06 RX ORDER — CEPHALEXIN 500 MG/1
500 CAPSULE ORAL 4 TIMES DAILY
Qty: 40 CAPSULE | Refills: 0 | Status: SHIPPED | OUTPATIENT
Start: 2025-08-06 | End: 2025-08-16

## 2025-08-06 RX ORDER — LEVOFLOXACIN 750 MG/1
750 TABLET, FILM COATED ORAL ONCE
Status: COMPLETED | OUTPATIENT
Start: 2025-08-06 | End: 2025-08-06

## 2025-08-06 RX ORDER — CLINDAMYCIN HYDROCHLORIDE 300 MG/1
300 CAPSULE ORAL 3 TIMES DAILY
Qty: 30 CAPSULE | Refills: 0 | Status: SHIPPED | OUTPATIENT
Start: 2025-08-06 | End: 2025-08-16

## 2025-08-06 RX ORDER — CLINDAMYCIN HYDROCHLORIDE 150 MG/1
300 CAPSULE ORAL ONCE
Status: COMPLETED | OUTPATIENT
Start: 2025-08-06 | End: 2025-08-06

## 2025-08-06 RX ORDER — LEVOFLOXACIN 750 MG/1
750 TABLET, FILM COATED ORAL EVERY OTHER DAY
Qty: 4 TABLET | Refills: 0 | Status: SHIPPED | OUTPATIENT
Start: 2025-08-08 | End: 2025-08-16

## 2025-08-06 RX ORDER — METOPROLOL TARTRATE 50 MG
50 TABLET ORAL ONCE
Status: COMPLETED | OUTPATIENT
Start: 2025-08-06 | End: 2025-08-06

## 2025-08-06 RX ORDER — OXYCODONE HYDROCHLORIDE 5 MG/1
10 TABLET ORAL ONCE
Refills: 0 | Status: COMPLETED | OUTPATIENT
Start: 2025-08-06 | End: 2025-08-06

## 2025-08-06 RX ADMIN — CLINDAMYCIN HYDROCHLORIDE 300 MG: 150 CAPSULE ORAL at 18:44

## 2025-08-06 RX ADMIN — CEPHALEXIN 500 MG: 250 CAPSULE ORAL at 18:44

## 2025-08-06 RX ADMIN — LEVOFLOXACIN 750 MG: 750 TABLET, FILM COATED ORAL at 18:44

## 2025-08-06 RX ADMIN — TETANUS TOXOID, REDUCED DIPHTHERIA TOXOID AND ACELLULAR PERTUSSIS VACCINE, ADSORBED 0.5 ML: 5; 2.5; 8; 8; 2.5 SUSPENSION INTRAMUSCULAR at 18:44

## 2025-08-06 RX ADMIN — OXYCODONE HYDROCHLORIDE 10 MG: 5 TABLET ORAL at 18:44

## 2025-08-06 RX ADMIN — METOPROLOL TARTRATE 50 MG: 50 TABLET, FILM COATED ORAL at 18:44

## 2025-08-06 SDOH — SOCIAL STABILITY: SOCIAL INSECURITY: HOW OFTEN DOES ANYONE, INCLUDING FAMILY AND FRIENDS, SCREAM OR CURSE AT YOU?: NEVER

## 2025-08-06 SDOH — SOCIAL STABILITY: SOCIAL INSECURITY: HOW OFTEN DOES ANYONE, INCLUDING FAMILY AND FRIENDS, THREATEN YOU WITH HARM?: NEVER

## 2025-08-06 SDOH — SOCIAL STABILITY: SOCIAL INSECURITY: HOW OFTEN DOES ANYONE, INCLUDING FAMILY AND FRIENDS, PHYSICALLY HURT YOU?: NEVER

## 2025-08-06 SDOH — SOCIAL STABILITY: SOCIAL INSECURITY: HOW OFTEN DOES ANYONE, INCLUDING FAMILY AND FRIENDS, INSULT OR TALK DOWN TO YOU?: NEVER

## 2025-08-06 ASSESSMENT — ENCOUNTER SYMPTOMS
BACK PAIN: 0
NAUSEA: 0
WOUND: 1
ABDOMINAL PAIN: 0
WEAKNESS: 0
DIARRHEA: 0
VOMITING: 0
HEADACHES: 0
NUMBNESS: 0
SHORTNESS OF BREATH: 0
FEVER: 0
COUGH: 0

## 2025-08-06 ASSESSMENT — PAIN SCALES - GENERAL: PAINLEVEL_OUTOF10: 7

## 2025-08-06 ASSESSMENT — PAIN DESCRIPTION - PAIN TYPE: TYPE: ACUTE PAIN

## 2025-08-07 LAB — RAINBOW EXTRA TUBES HOLD SPECIMEN: NORMAL

## (undated) DEVICE — MASK, LARYNGEAL AIRWAY #4

## (undated) DEVICE — COVER FOOT UNIVERSAL DISP. - (25EA/CA)

## (undated) DEVICE — SODIUM CHL. IRRIGATION 0.9% 3000ML (4EA/CA 65CA/PF)

## (undated) DEVICE — GLOVE BIOGEL SZ 8 SURGICAL PF LTX - (50PR/BX 4BX/CA)

## (undated) DEVICE — SET LEADWIRE 5 LEAD BEDSIDE DISPOSABLE ECG (1SET OF 5/EA)

## (undated) DEVICE — TUBING CLEARLINK DUO-VENT - C-FLO (48EA/CA)

## (undated) DEVICE — CATHETER IV 20 GA X 1-1/4 ---SURG.& SDS ONLY--- (50EA/BX)

## (undated) DEVICE — JELLY SURGILUBE STERILE TUBE 4.25 OZ (1/EA)

## (undated) DEVICE — ARMREST CRADLE FOAM - (2PR/PK 12PR/CA)

## (undated) DEVICE — MASK O2 VNYL ADLT RBRTH HI - (50/CS)

## (undated) DEVICE — NEPTUNE 4 PORT MANIFOLD - (20/PK)

## (undated) DEVICE — SENSOR OXIMETER ADULT SPO2 RD SET (20EA/BX)

## (undated) DEVICE — STAPLER SKIN DISP - (6/BX 10BX/CA) VISISTAT

## (undated) DEVICE — SENSOR SPO2 NEO LNCS ADHESIVE (20/BX) SEE USER NOTES

## (undated) DEVICE — LIGASURE TISSUE FUSION  - SINGLE USE (6/CA)

## (undated) DEVICE — FORCEP RADIAL JAW 4 STANDARD CAPACITY W/NEEDLE 240CM (40EA/BX)

## (undated) DEVICE — SUTURE 3-0 VICRYL PLUS SH - 8X 18 INCH (12/BX)

## (undated) DEVICE — SET EXTENSION WITH 2 PORTS (48EA/CA) ***PART #2C8610 IS A SUBSTITUTE*****

## (undated) DEVICE — WATER IRRIGATION STERILE 1000ML (12EA/CA)

## (undated) DEVICE — STERI STRIP COMPOUND BENZOIN - TINCTURE 0.6ML WITH APPLICATOR (40EA/BX)

## (undated) DEVICE — TUBE E-T HI-LO CUFF 7.0MM (10EA/PK)

## (undated) DEVICE — BAG URODRAIN WITH TUBING - (20/CA)

## (undated) DEVICE — CANISTER SUCTION 3000ML MECHANICAL FILTER AUTO SHUTOFF MEDI-VAC NONSTERILE LF DISP  (40EA/CA)

## (undated) DEVICE — WIRE GUIDE SENSOR DUAL FLEX - 5/BX

## (undated) DEVICE — MASK ANESTHESIA ADULT  - (100/CA)

## (undated) DEVICE — KIT CUSTOM PROCEDURE SINGLE FOR ENDO  (15/CA)

## (undated) DEVICE — TUBE SUCTION YANKAUER  1/4 X 6FT (20EA/CA)"

## (undated) DEVICE — BITEBLOCK ENDOSCOPIC PEDI. - (25/BX)

## (undated) DEVICE — CANNULA O2 COMFORT SOFT EAR ADULT 7 FT TUBING (50/CA)

## (undated) DEVICE — KIT 2.75IN COL ILSTM 2 PC DRN - 70MM 2 3/4 INCH THIS IS FOR THE OR ONLY (5/BX)

## (undated) DEVICE — KIT ANESTHESIA W/CIRCUIT & 3/LT BAG W/FILTER (20EA/CA)

## (undated) DEVICE — CANISTER SUCTION RIGID RED 1500CC (40EA/CA)

## (undated) DEVICE — SODIUM CHL IRRIGATION 0.9% 1000ML (12EA/CA)

## (undated) DEVICE — GLOVE BIOGEL PI INDICATOR SZ 7.5 SURGICAL PF LF -(50/BX 4BX/CA)

## (undated) DEVICE — GOWN SURGEONS LARGE - (32/CA)

## (undated) DEVICE — TUBE CONNECT SUCTION CLEAR 120 X 1/4" (50EA/CA)"

## (undated) DEVICE — SYRINGE SAFETY 5 ML 18 GA X 1-1/2 BLUNT LL (100/BX 4BX/CA)

## (undated) DEVICE — SUTURE 1 VICRYL PLUS CTX - 36 INCH (36/BX)

## (undated) DEVICE — GOWN WARMING STANDARD FLEX - (30/CA)

## (undated) DEVICE — GOWN SURGICAL X-LARGE ULTRA - FILM-REINFORCED (20/CA)

## (undated) DEVICE — CATHETER URET DUAL LUMEN

## (undated) DEVICE — SPONGE GAUZESTER 4 X 4 4PLY - (128PK/CA)

## (undated) DEVICE — TUBING O2 7FT TIP SMTH BORE - (50/CA)

## (undated) DEVICE — CATHETER URET OPEN END 6FR (10EA/BX)

## (undated) DEVICE — BAG DRAINAGE LINGEMAN CYSTO/URO (20EA/CA)

## (undated) DEVICE — BITE BLOCK, DISP.

## (undated) DEVICE — GOWN SURGEONS X-LARGE - DISP. (30/CA)

## (undated) DEVICE — PROTECTOR ULNA NERVE - (36PR/CA)

## (undated) DEVICE — ELECTRODE 850 FOAM ADHESIVE - HYDROGEL RADIOTRNSPRNT (50/PK)

## (undated) DEVICE — GLOVE SZ 7 BIOGEL PI MICRO - PF LF (50PR/BX 4BX/CA)

## (undated) DEVICE — WATER IRRIG. STER 3000 ML - (4/CA)

## (undated) DEVICE — FILM CASSETTE ENDO

## (undated) DEVICE — NEEDLE NON SAFETY HYPO 22 GA X 1 1/2 IN (100/BX)

## (undated) DEVICE — GLOVE BIOGEL PI INDICATOR SZ 6.5 SURGICAL PF LF - (50/BX 4BX/CA)

## (undated) DEVICE — SLEEVE, VASO, THIGH, MED

## (undated) DEVICE — GLOVE BIOGEL PI INDICATOR SZ 7.0 SURGICAL PF LF - (50/BX 4BX/CA)

## (undated) DEVICE — SUTURE GENERAL

## (undated) DEVICE — TUBE NG SALEM SUMP 14FR (50EA/BX)

## (undated) DEVICE — BLADE SURGICAL #15 - (50/BX 3BX/CA)

## (undated) DEVICE — ELECTRODE DUAL RETURN W/ CORD - (50/PK)

## (undated) DEVICE — TUBE E-T HI-LO CUFF 6.5MM (10EA/BX)

## (undated) DEVICE — CATHERTER CLEAR SINGLE USE INJECTION THERAPY NEEDLE 25GA X 4MM 2.3MM X 240CM (5EA/BX)

## (undated) DEVICE — GLOVE BIOGEL SZ 6.5 SURGICAL PF LTX (50PR/BX 4BX/CA)

## (undated) DEVICE — CONTAINER, SPECIMEN, STERILE

## (undated) DEVICE — JELLY SURGILUBE STERILE FOIL 5 GM (150EA/BX)

## (undated) DEVICE — ZIPWIRE STIFF .035 STRAIGHT TIP (5EA/BX)

## (undated) DEVICE — CONTAINER SPECIMEN BAG OR - STERILE 4 OZ W/LID (100EA/CA)

## (undated) DEVICE — BLOCK BITE ENDOSCOPIC 2809 - (100/BX) INTERMEDIATE

## (undated) DEVICE — ADAPTOR UROLOK - 10/BX

## (undated) DEVICE — TOWEL STOP TIMEOUT SAFETY FLAG (40EA/CA)

## (undated) DEVICE — SYRINGE STERILE 10 ML LL (200/BX)

## (undated) DEVICE — SET IRRIGATION CYSTOSCOPY Y-TYPE L81 IN (20EA/CA)

## (undated) DEVICE — MANIFOLD NEPTUNE 1 PORT (20/PK)

## (undated) DEVICE — GLOVE BIOGEL SZ 7.5 SURGICAL PF LTX - (50PR/BX 4BX/CA)

## (undated) DEVICE — SUCTION INSTRUMENT YANKAUER BULBOUS TIP W/O VENT (50EA/CA)

## (undated) DEVICE — PACK MAJOR BASIN - (2EA/CA)

## (undated) DEVICE — MEDICINE CUP STERILE 2 OZ (100/CA)

## (undated) DEVICE — BAG SPONGE COUNT 10.25 X 32 - BLUE (250/CA)

## (undated) DEVICE — LACTATED RINGERS INJ 1000 ML - (14EA/CA 60CA/PF)

## (undated) DEVICE — PACK CYSTOSCOPY III - (8/CA)

## (undated) DEVICE — HEAD HOLDER JUNIOR/ADULT

## (undated) DEVICE — DRAPE LAPAROTOMY T SHEET - (12EA/CA)

## (undated) DEVICE — PACK MINOR BASIN - (4EA/CA)

## (undated) DEVICE — BITE BLOCK ADULT 60FR (100EA/CA)

## (undated) DEVICE — SOD. CHL. INJ. 0.9% 1000 ML - (14EA/CA 60CA/PF)

## (undated) DEVICE — GLOVE BIOGEL SZ 7 SURGICAL PF LTX - (50PR/BX 4BX/CA)

## (undated) DEVICE — DRAPE TABLE UROLOGY DRAINAGE (20EA/BX)

## (undated) DEVICE — DRAIN PENROSE 3/8 IN X 12 IN - STERILE (25EA/BX)

## (undated) DEVICE — BASKET ZERO 1.9FR X 120CM

## (undated) DEVICE — TUBE CONNECTING SUCTION - CLEAR PLASTIC STERILE 72 IN (50EA/CA)

## (undated) DEVICE — DRAIN PENROSE STERILE 1/4 X - 18 IN  (25EA/BX)

## (undated) DEVICE — KIT  I.V. START (100EA/CA)

## (undated) DEVICE — TOWELS CLOTH SURGICAL - (4/PK 20PK/CA)

## (undated) DEVICE — TRAY SRGPRP PVP IOD WT PRP - (20/CA)

## (undated) DEVICE — SYRINGE ALLIANCE INFLATION (5EA/BX)

## (undated) DEVICE — SLEEVE VASO DVT COMPRESSION CALF MED - (10PR/CA)

## (undated) DEVICE — KIT CUSTOM PROCEDURE SINGLE FOR ENDO (15/CA)

## (undated) DEVICE — SUTURE 3-0 CHROMIC GUT SH 27 (36PK/BX)"

## (undated) DEVICE — CONNECTOR HOSE NEPTUNE FOR CYSTO ROOM

## (undated) DEVICE — GRAFT MESH SEPRAFILM PRO PACK - 5/BX CONTAINS 6 3X5 PIECES

## (undated) DEVICE — PAD LAP STERILE 18 X 18 - (5/PK 40PK/CA)

## (undated) DEVICE — SYRINGE DISP. 60 CC LL - (30/BX, 12BX/CA)**WHEN THESE ARE GONE ORDER #500206**

## (undated) DEVICE — BALLOON CRE WG 12-15MM 240CM 5.5 F G

## (undated) DEVICE — TUBE SUCTION YANKAUER 1/4 X 6FT (50EA/CA)"

## (undated) DEVICE — SYRINGE 10 ML CONTROL LL (25EA/BX 4BX/CA)

## (undated) DEVICE — GLOVE, BIOGEL ECLIPSE, SZ 7.0, PF LTX (50/BX)

## (undated) DEVICE — SUTURE 2-0 VICRYL PLUS TP-1 - (24/BX)

## (undated) DEVICE — MASK WITH FACE SHIELD (25/BX 4BX/CA)

## (undated) DEVICE — GRAFT MESH SEPRAFILM - 10/BX

## (undated) DEVICE — CANISTER SUCTION 3000ML MECHANICAL FILTER AUTO SHUTOFF MEDI-VAC NONSTERILE LF DISP (40EA/CA)

## (undated) DEVICE — CUFF BP ADULT LARGE DISPOSABLE (20EA/CA)

## (undated) DEVICE — SLEEVE VASO CALF MED - (10PR/CA)

## (undated) DEVICE — SOD. CHL 10CC SYRINGE PREFILL - W/10 CC (30/BX)

## (undated) DEVICE — SCOPE DIGITAL URETEROSCOPE DISPOSABLE

## (undated) DEVICE — GLOVE SZ 6.5 BIOGEL PI MICRO - PF LF (50PR/BX)

## (undated) DEVICE — MASK OXYGEN VNYL ADLT MED CONC WITH 7 FOOT TUBING - (50EA/CA)

## (undated) DEVICE — MASK PANORAMIC OXYGEN PRO2 (30EA/CA)

## (undated) DEVICE — SPONGE GAUZE NON-STERILE 4X4 - (2000/CA 10PK/CA)

## (undated) DEVICE — KIT ROOM DECONTAMINATION

## (undated) DEVICE — PACK CYSTO III (4EA/CA)

## (undated) DEVICE — GLOVE BIOGEL PI INDICATOR SZ 8.5 SURGICAL PF LF - (50PR/BX 4BX/CA)

## (undated) DEVICE — CLOSURE SKIN STRIP 1/2 X 4 IN - (STERI STRIP) (50/BX 4BX/CA)

## (undated) DEVICE — PACK CYSTO III (2EA/CA)

## (undated) DEVICE — BALLOON CRE WG 10-12MM 240CM 5.5 F G

## (undated) DEVICE — CHLORAPREP 26 ML APPLICATOR - ORANGE TINT(25/CA)

## (undated) DEVICE — GLOVE BIOGEL INDICATOR SZ 8 SURGICAL PF LTX - (50/BX 4BX/CA)